# Patient Record
Sex: FEMALE | Race: WHITE | NOT HISPANIC OR LATINO | Employment: OTHER | URBAN - METROPOLITAN AREA
[De-identification: names, ages, dates, MRNs, and addresses within clinical notes are randomized per-mention and may not be internally consistent; named-entity substitution may affect disease eponyms.]

---

## 2017-01-12 ENCOUNTER — TRANSFERRED RECORDS (OUTPATIENT)
Dept: HEALTH INFORMATION MANAGEMENT | Facility: CLINIC | Age: 52
End: 2017-01-12

## 2017-08-29 ENCOUNTER — TELEPHONE (OUTPATIENT)
Dept: FAMILY MEDICINE | Age: 52
End: 2017-08-29

## 2018-05-13 ENCOUNTER — HOSPITAL ENCOUNTER (INPATIENT)
Facility: CLINIC | Age: 53
LOS: 4 days | Discharge: HOME | End: 2018-05-17
Attending: EMERGENCY MEDICINE | Admitting: INTERNAL MEDICINE
Payer: COMMERCIAL

## 2018-05-13 ENCOUNTER — APPOINTMENT (OUTPATIENT)
Dept: ULTRASOUND IMAGING | Facility: CLINIC | Age: 53
End: 2018-05-13
Attending: EMERGENCY MEDICINE
Payer: COMMERCIAL

## 2018-05-13 DIAGNOSIS — R11.2 INTRACTABLE VOMITING WITH NAUSEA, UNSPECIFIED VOMITING TYPE: ICD-10-CM

## 2018-05-13 DIAGNOSIS — E87.1 HYPONATREMIA: ICD-10-CM

## 2018-05-13 DIAGNOSIS — R94.31 PROLONGED Q-T INTERVAL ON ECG: ICD-10-CM

## 2018-05-13 DIAGNOSIS — K83.09 CHOLANGITIS (H): ICD-10-CM

## 2018-05-13 DIAGNOSIS — E87.6 HYPOKALEMIA: ICD-10-CM

## 2018-05-13 DIAGNOSIS — R10.13 ABDOMINAL PAIN, EPIGASTRIC: ICD-10-CM

## 2018-05-13 LAB
ALBUMIN SERPL-MCNC: 2.1 G/DL (ref 3.4–5)
ALP SERPL-CCNC: 441 U/L (ref 40–150)
ALT SERPL W P-5'-P-CCNC: 83 U/L (ref 0–50)
ANION GAP SERPL CALCULATED.3IONS-SCNC: 11 MMOL/L (ref 3–14)
AST SERPL W P-5'-P-CCNC: 119 U/L (ref 0–45)
BASOPHILS # BLD AUTO: 0.1 10E9/L (ref 0–0.2)
BASOPHILS NFR BLD AUTO: 0.3 %
BILIRUB SERPL-MCNC: 3.4 MG/DL (ref 0.2–1.3)
BUN SERPL-MCNC: 13 MG/DL (ref 7–30)
CALCIUM SERPL-MCNC: 8.5 MG/DL (ref 8.5–10.1)
CHLORIDE SERPL-SCNC: 95 MMOL/L (ref 94–109)
CO2 SERPL-SCNC: 25 MMOL/L (ref 20–32)
CREAT SERPL-MCNC: 0.62 MG/DL (ref 0.52–1.04)
DIFFERENTIAL METHOD BLD: ABNORMAL
EOSINOPHIL # BLD AUTO: 0 10E9/L (ref 0–0.7)
EOSINOPHIL NFR BLD AUTO: 0 %
ERYTHROCYTE [DISTWIDTH] IN BLOOD BY AUTOMATED COUNT: 13.6 % (ref 10–15)
GFR SERPL CREATININE-BSD FRML MDRD: >90 ML/MIN/1.7M2
GLUCOSE SERPL-MCNC: 121 MG/DL (ref 70–99)
HCT VFR BLD AUTO: 36.3 % (ref 35–47)
HGB BLD-MCNC: 12.6 G/DL (ref 11.7–15.7)
IMM GRANULOCYTES # BLD: 0.1 10E9/L (ref 0–0.4)
IMM GRANULOCYTES NFR BLD: 0.6 %
INR PPP: 1.22 (ref 0.86–1.14)
LACTATE BLD-SCNC: 1.4 MMOL/L (ref 0.7–2)
LIPASE SERPL-CCNC: 142 U/L (ref 73–393)
LYMPHOCYTES # BLD AUTO: 0.8 10E9/L (ref 0.8–5.3)
LYMPHOCYTES NFR BLD AUTO: 3.4 %
MAGNESIUM SERPL-MCNC: 1.9 MG/DL (ref 1.6–2.3)
MCH RBC QN AUTO: 29.3 PG (ref 26.5–33)
MCHC RBC AUTO-ENTMCNC: 34.7 G/DL (ref 31.5–36.5)
MCV RBC AUTO: 84 FL (ref 78–100)
MONOCYTES # BLD AUTO: 0.6 10E9/L (ref 0–1.3)
MONOCYTES NFR BLD AUTO: 2.5 %
NEUTROPHILS # BLD AUTO: 20.6 10E9/L (ref 1.6–8.3)
NEUTROPHILS NFR BLD AUTO: 93.2 %
NRBC # BLD AUTO: 0 10*3/UL
NRBC BLD AUTO-RTO: 0 /100
PLATELET # BLD AUTO: 387 10E9/L (ref 150–450)
POTASSIUM SERPL-SCNC: 2.1 MMOL/L (ref 3.4–5.3)
PROT SERPL-MCNC: 8.3 G/DL (ref 6.8–8.8)
RBC # BLD AUTO: 4.3 10E12/L (ref 3.8–5.2)
SODIUM SERPL-SCNC: 131 MMOL/L (ref 133–144)
WBC # BLD AUTO: 22.2 10E9/L (ref 4–11)

## 2018-05-13 PROCEDURE — 83735 ASSAY OF MAGNESIUM: CPT | Performed by: EMERGENCY MEDICINE

## 2018-05-13 PROCEDURE — 87040 BLOOD CULTURE FOR BACTERIA: CPT | Performed by: INTERNAL MEDICINE

## 2018-05-13 PROCEDURE — 99223 1ST HOSP IP/OBS HIGH 75: CPT | Mod: AI | Performed by: INTERNAL MEDICINE

## 2018-05-13 PROCEDURE — 85610 PROTHROMBIN TIME: CPT | Performed by: EMERGENCY MEDICINE

## 2018-05-13 PROCEDURE — 99285 EMERGENCY DEPT VISIT HI MDM: CPT | Mod: 25

## 2018-05-13 PROCEDURE — 96375 TX/PRO/DX INJ NEW DRUG ADDON: CPT

## 2018-05-13 PROCEDURE — 76705 ECHO EXAM OF ABDOMEN: CPT

## 2018-05-13 PROCEDURE — 36415 COLL VENOUS BLD VENIPUNCTURE: CPT | Performed by: INTERNAL MEDICINE

## 2018-05-13 PROCEDURE — 12000007 ZZH R&B INTERMEDIATE

## 2018-05-13 PROCEDURE — 25000128 H RX IP 250 OP 636: Performed by: EMERGENCY MEDICINE

## 2018-05-13 PROCEDURE — 83605 ASSAY OF LACTIC ACID: CPT | Performed by: EMERGENCY MEDICINE

## 2018-05-13 PROCEDURE — 96365 THER/PROPH/DIAG IV INF INIT: CPT

## 2018-05-13 PROCEDURE — 80053 COMPREHEN METABOLIC PANEL: CPT | Performed by: EMERGENCY MEDICINE

## 2018-05-13 PROCEDURE — 85025 COMPLETE CBC W/AUTO DIFF WBC: CPT | Performed by: EMERGENCY MEDICINE

## 2018-05-13 PROCEDURE — 25000132 ZZH RX MED GY IP 250 OP 250 PS 637: Performed by: EMERGENCY MEDICINE

## 2018-05-13 PROCEDURE — 93005 ELECTROCARDIOGRAM TRACING: CPT

## 2018-05-13 PROCEDURE — 25000132 ZZH RX MED GY IP 250 OP 250 PS 637: Performed by: INTERNAL MEDICINE

## 2018-05-13 PROCEDURE — 25000128 H RX IP 250 OP 636: Performed by: INTERNAL MEDICINE

## 2018-05-13 PROCEDURE — 96361 HYDRATE IV INFUSION ADD-ON: CPT

## 2018-05-13 PROCEDURE — 83690 ASSAY OF LIPASE: CPT | Performed by: EMERGENCY MEDICINE

## 2018-05-13 RX ORDER — POTASSIUM CL/LIDO/0.9 % NACL 10MEQ/0.1L
10 INTRAVENOUS SOLUTION, PIGGYBACK (ML) INTRAVENOUS ONCE
Status: COMPLETED | OUTPATIENT
Start: 2018-05-13 | End: 2018-05-13

## 2018-05-13 RX ORDER — LISINOPRIL 10 MG/1
10 TABLET ORAL DAILY
Status: ON HOLD | COMMUNITY
End: 2018-05-17

## 2018-05-13 RX ORDER — BISACODYL 10 MG
10 SUPPOSITORY, RECTAL RECTAL DAILY PRN
Status: DISCONTINUED | OUTPATIENT
Start: 2018-05-13 | End: 2018-05-17 | Stop reason: HOSPADM

## 2018-05-13 RX ORDER — POTASSIUM CHLORIDE 1500 MG/1
20-40 TABLET, EXTENDED RELEASE ORAL
Status: DISCONTINUED | OUTPATIENT
Start: 2018-05-13 | End: 2018-05-17 | Stop reason: HOSPADM

## 2018-05-13 RX ORDER — SIMVASTATIN 20 MG
20 TABLET ORAL AT BEDTIME
Status: ON HOLD | COMMUNITY
End: 2021-04-30

## 2018-05-13 RX ORDER — ONDANSETRON 2 MG/ML
4 INJECTION INTRAMUSCULAR; INTRAVENOUS
Status: COMPLETED | OUTPATIENT
Start: 2018-05-13 | End: 2018-05-13

## 2018-05-13 RX ORDER — MAGNESIUM SULFATE HEPTAHYDRATE 40 MG/ML
4 INJECTION, SOLUTION INTRAVENOUS EVERY 4 HOURS PRN
Status: DISCONTINUED | OUTPATIENT
Start: 2018-05-13 | End: 2018-05-17 | Stop reason: HOSPADM

## 2018-05-13 RX ORDER — URSODIOL 300 MG/1
600 CAPSULE ORAL 2 TIMES DAILY
Status: DISCONTINUED | OUTPATIENT
Start: 2018-05-13 | End: 2018-05-17 | Stop reason: HOSPADM

## 2018-05-13 RX ORDER — AMOXICILLIN 250 MG
1 CAPSULE ORAL 2 TIMES DAILY PRN
Status: DISCONTINUED | OUTPATIENT
Start: 2018-05-13 | End: 2018-05-17 | Stop reason: HOSPADM

## 2018-05-13 RX ORDER — AZITHROMYCIN 250 MG/1
250 TABLET, FILM COATED ORAL DAILY
Status: ON HOLD | COMMUNITY
End: 2018-05-17

## 2018-05-13 RX ORDER — PROCHLORPERAZINE MALEATE 5 MG
10 TABLET ORAL EVERY 6 HOURS PRN
Status: DISCONTINUED | OUTPATIENT
Start: 2018-05-13 | End: 2018-05-17 | Stop reason: HOSPADM

## 2018-05-13 RX ORDER — OXYCODONE HYDROCHLORIDE 5 MG/1
5-10 TABLET ORAL
Status: DISCONTINUED | OUTPATIENT
Start: 2018-05-13 | End: 2018-05-17 | Stop reason: HOSPADM

## 2018-05-13 RX ORDER — NALOXONE HYDROCHLORIDE 0.4 MG/ML
.1-.4 INJECTION, SOLUTION INTRAMUSCULAR; INTRAVENOUS; SUBCUTANEOUS
Status: DISCONTINUED | OUTPATIENT
Start: 2018-05-13 | End: 2018-05-17 | Stop reason: HOSPADM

## 2018-05-13 RX ORDER — MULTIVITAMIN,THERAPEUTIC
1 TABLET ORAL DAILY
Status: ON HOLD | COMMUNITY
End: 2021-04-13

## 2018-05-13 RX ORDER — POTASSIUM CHLORIDE 1.5 G/1.58G
20-40 POWDER, FOR SOLUTION ORAL
Status: DISCONTINUED | OUTPATIENT
Start: 2018-05-13 | End: 2018-05-17 | Stop reason: HOSPADM

## 2018-05-13 RX ORDER — POTASSIUM CHLORIDE 1500 MG/1
20 TABLET, EXTENDED RELEASE ORAL ONCE
Status: COMPLETED | OUTPATIENT
Start: 2018-05-13 | End: 2018-05-13

## 2018-05-13 RX ORDER — LISINOPRIL 10 MG/1
10 TABLET ORAL DAILY
Status: DISCONTINUED | OUTPATIENT
Start: 2018-05-13 | End: 2018-05-17 | Stop reason: HOSPADM

## 2018-05-13 RX ORDER — SODIUM CHLORIDE, SODIUM LACTATE, POTASSIUM CHLORIDE, CALCIUM CHLORIDE 600; 310; 30; 20 MG/100ML; MG/100ML; MG/100ML; MG/100ML
INJECTION, SOLUTION INTRAVENOUS CONTINUOUS
Status: DISCONTINUED | OUTPATIENT
Start: 2018-05-13 | End: 2018-05-15

## 2018-05-13 RX ORDER — URSODIOL 300 MG/1
300 CAPSULE ORAL 2 TIMES DAILY
COMMUNITY
End: 2021-03-23

## 2018-05-13 RX ORDER — HYDROMORPHONE HYDROCHLORIDE 1 MG/ML
.3-.5 INJECTION, SOLUTION INTRAMUSCULAR; INTRAVENOUS; SUBCUTANEOUS
Status: DISCONTINUED | OUTPATIENT
Start: 2018-05-13 | End: 2018-05-17 | Stop reason: HOSPADM

## 2018-05-13 RX ORDER — HYDROCHLOROTHIAZIDE 25 MG/1
25 TABLET ORAL DAILY
Status: ON HOLD | COMMUNITY
End: 2018-05-17

## 2018-05-13 RX ORDER — PROCHLORPERAZINE 25 MG
25 SUPPOSITORY, RECTAL RECTAL EVERY 12 HOURS PRN
Status: DISCONTINUED | OUTPATIENT
Start: 2018-05-13 | End: 2018-05-17 | Stop reason: HOSPADM

## 2018-05-13 RX ORDER — ONDANSETRON 2 MG/ML
4 INJECTION INTRAMUSCULAR; INTRAVENOUS EVERY 6 HOURS PRN
Status: DISCONTINUED | OUTPATIENT
Start: 2018-05-13 | End: 2018-05-17 | Stop reason: HOSPADM

## 2018-05-13 RX ORDER — AMOXICILLIN 250 MG
2 CAPSULE ORAL 2 TIMES DAILY PRN
Status: DISCONTINUED | OUTPATIENT
Start: 2018-05-13 | End: 2018-05-17 | Stop reason: HOSPADM

## 2018-05-13 RX ORDER — POTASSIUM CL/LIDO/0.9 % NACL 10MEQ/0.1L
10 INTRAVENOUS SOLUTION, PIGGYBACK (ML) INTRAVENOUS
Status: DISCONTINUED | OUTPATIENT
Start: 2018-05-13 | End: 2018-05-17 | Stop reason: HOSPADM

## 2018-05-13 RX ORDER — POTASSIUM CHLORIDE 29.8 MG/ML
20 INJECTION INTRAVENOUS
Status: DISCONTINUED | OUTPATIENT
Start: 2018-05-13 | End: 2018-05-17 | Stop reason: HOSPADM

## 2018-05-13 RX ORDER — SODIUM CHLORIDE 9 MG/ML
1000 INJECTION, SOLUTION INTRAVENOUS CONTINUOUS
Status: DISCONTINUED | OUTPATIENT
Start: 2018-05-13 | End: 2018-05-13

## 2018-05-13 RX ORDER — POTASSIUM CHLORIDE 7.45 MG/ML
10 INJECTION INTRAVENOUS
Status: DISCONTINUED | OUTPATIENT
Start: 2018-05-13 | End: 2018-05-17 | Stop reason: HOSPADM

## 2018-05-13 RX ORDER — HYDROMORPHONE HYDROCHLORIDE 1 MG/ML
0.5 INJECTION, SOLUTION INTRAMUSCULAR; INTRAVENOUS; SUBCUTANEOUS
Status: COMPLETED | OUTPATIENT
Start: 2018-05-13 | End: 2018-05-13

## 2018-05-13 RX ORDER — GUAIFENESIN AND CODEINE PHOSPHATE 100; 10 MG/5ML; MG/5ML
1 SOLUTION ORAL
Status: ON HOLD | COMMUNITY
End: 2021-04-13

## 2018-05-13 RX ORDER — ONDANSETRON 4 MG/1
4 TABLET, ORALLY DISINTEGRATING ORAL EVERY 6 HOURS PRN
Status: DISCONTINUED | OUTPATIENT
Start: 2018-05-13 | End: 2018-05-17 | Stop reason: HOSPADM

## 2018-05-13 RX ADMIN — Medication 10 MEQ: at 10:51

## 2018-05-13 RX ADMIN — HYDROMORPHONE HYDROCHLORIDE 0.5 MG: 1 INJECTION, SOLUTION INTRAMUSCULAR; INTRAVENOUS; SUBCUTANEOUS at 09:32

## 2018-05-13 RX ADMIN — Medication 10 MEQ: at 14:30

## 2018-05-13 RX ADMIN — ONDANSETRON 4 MG: 2 INJECTION INTRAMUSCULAR; INTRAVENOUS at 09:29

## 2018-05-13 RX ADMIN — Medication 10 MEQ: at 18:54

## 2018-05-13 RX ADMIN — Medication 10 MEQ: at 22:36

## 2018-05-13 RX ADMIN — POTASSIUM CHLORIDE 20 MEQ: 1500 TABLET, EXTENDED RELEASE ORAL at 10:51

## 2018-05-13 RX ADMIN — TAZOBACTAM SODIUM AND PIPERACILLIN SODIUM 3.38 G: 375; 3 INJECTION, SOLUTION INTRAVENOUS at 11:58

## 2018-05-13 RX ADMIN — SODIUM CHLORIDE 1000 ML: 9 INJECTION, SOLUTION INTRAVENOUS at 09:34

## 2018-05-13 RX ADMIN — SODIUM CHLORIDE, POTASSIUM CHLORIDE, SODIUM LACTATE AND CALCIUM CHLORIDE: 600; 310; 30; 20 INJECTION, SOLUTION INTRAVENOUS at 14:30

## 2018-05-13 RX ADMIN — SODIUM CHLORIDE 1000 ML: 9 INJECTION, SOLUTION INTRAVENOUS at 11:58

## 2018-05-13 RX ADMIN — Medication 10 MEQ: at 13:16

## 2018-05-13 RX ADMIN — TAZOBACTAM SODIUM AND PIPERACILLIN SODIUM 3.38 G: 375; 3 INJECTION, SOLUTION INTRAVENOUS at 18:03

## 2018-05-13 RX ADMIN — Medication 10 MEQ: at 21:26

## 2018-05-13 RX ADMIN — URSODIOL 600 MG: 300 CAPSULE ORAL at 21:03

## 2018-05-13 ASSESSMENT — ENCOUNTER SYMPTOMS
COUGH: 1
VOMITING: 1
CONSTIPATION: 1
ABDOMINAL PAIN: 1

## 2018-05-13 ASSESSMENT — ACTIVITIES OF DAILY LIVING (ADL)
ADLS_ACUITY_SCORE: 9
ADLS_ACUITY_SCORE: 11

## 2018-05-13 NOTE — IP AVS SNAPSHOT
MRN:[id number]                      After Visit Summary   5/13/2018    Berta Nava    MRN: [id number]           Thank you!     Thank you for choosing [facility] for your care. Our goal is always to provide you with excellent care. Hearing back from our patients is one way we can continue to improve our services. Please take a few minutes to complete the written survey that you may receive in the mail after you visit. If you would like to speak to someone directly about your visit please contact Patient Relations at [phone]. Thank you!          Patient Information     Date Of Birth          1965        Designated Caregiver       Most Recent Value    Caregiver    Will someone help with your care after discharge? no      About your hospital stay     You were admitted on:  May 13, 2018 You last received care in the:  [place] [address] [facility]    You were discharged on:  May 17, 2018        Reason for your hospital stay       cholangitis                  Who to Call     For medical emergencies, please call 911.  For non-urgent questions about your medical care, please call your primary care provider or clinic, [phone]          Attending Provider     Provider Specialty    Scot Harden MD Emergency Medicine    WilmingtonZafar hilario MD Internal Medicine       Primary Care Provider Office Phone # Fax #    [facility] [phone]       When to contact your care team       Increase in pain, nausea,vomiting or fever 101 or greater.                  After Care Instructions     Activity       Your activity upon discharge: activity as tolerated            Diet       Follow this diet upon discharge: Orders Placed This Encounter      Low Fiber Diet                  Follow-up Appointments     Follow-up and recommended labs and tests        Follow up with primary care provider, [facility], within 7 days to evaluate medication  "change and for hospital follow- up.  The following labs/tests are recommended: potassium and blood pressure check.  Stopped lisinopril and Dyazide due to soft blood pressure and hypokalemia.    Follow up with primary GI doctor.                  Pending Results     Date and Time Order Name Status Description    2018 1107 Blood culture Preliminary     2018 1107 Blood culture Preliminary             Statement of Approval     Ordered          18 1123  I have reviewed and agree with all the recommendations and orders detailed in this document.  EFFECTIVE NOW     Approved and electronically signed by:  James Wolf MD             Admission Information     Date & Time Provider Department Dept. Phone    2018 Zafar Nicole MD [place] [address] [facility] [phone]      Your Vitals Were     Blood Pressure Pulse Temperature Respirations Height Weight    95/55 (BP Location: Left arm) 98 98  F (36.7  C) (Oral) 17 1.651 m (5' 5\") 74.6 kg (164 lb 6.4 oz)    Pulse Oximetry BMI (Body Mass Index)                97% 27.36 kg/m2          e[employer] Information     [employer] lets you send messages to your doctor, view your test results, renew your prescriptions, schedule appointments and more. To sign up, go to [email or web address] . Click on \"Log in\" on the left side of the screen, which will take you to the Welcome page. Then click on \"Sign up Now\" on the right side of the page.     You will be asked to enter the access code listed below, as well as some personal information. Please follow the directions to create your username and password.     Your access code is: [email or web address]  Expires: 8/15/2018 11:24 AM     Your access code will  in 90 days. If you need help or a new code, please call your [place] clinic or [phone].        Care EveryWhere ID     This is your Care EveryWhere ID. This could be used by other organizations to access your [place] medical records  [id number]      "   Equal Access to Services     [facility]: Hadii aad ku hadrehancarmine Jaimeeselvin, waaxda luqadaha, qaybta kaalmada champ, arlyn berry. So [facility] [phone].    ATENCIÓN: Si sarahla toshia, tiene a marks disposición servicios gratuitos de asistencia lingüística. Papaame al [phone].    We comply with applicable federal civil rights laws and [place] laws. We do not discriminate on the basis of race, color, national origin, age, disability, sex, sexual orientation, or gender identity.               Review of your medicines      START taking        Dose / Directions    amoxicillin-clavulanate 875-125 MG per tablet   Commonly known as:  AUGMENTIN   Indication:  Infection Within the Abdomen        Dose:  1 tablet   Take 1 tablet by mouth every 12 hours for 7 days   Quantity:  14 tablet   Refills:  0         CONTINUE these medicines which have NOT CHANGED        Dose / Directions    Calcium carb-Vitamin D 500 mg [place]-200 units 500-200 MG-UNIT per tablet   Commonly known as:  OSCAL with D;Oyster Shell Calcium        Dose:  1 tablet   Take 1 tablet by mouth daily   Refills:  0       guaiFENesin-codeine 100-10 MG/5ML Soln   Commonly known as:  ROBITUSSIN AC        Dose:  1 tsp.   Take 1 tsp. by mouth nightly as needed for cough   Refills:  0       multivitamin, therapeutic Tabs tablet        Dose:  1 tablet   Take 1 tablet by mouth daily   Refills:  0       simvastatin 20 MG tablet   Commonly known as:  ZOCOR        Dose:  20 mg   Take 20 mg by mouth At Bedtime   Refills:  0       ursodiol 300 MG capsule   Commonly known as:  ACTIGALL        Dose:  600 mg   Take 600 mg by mouth 2 times daily   Refills:  0         STOP taking     azithromycin 250 MG tablet   Commonly known as:  ZITHROMAX           hydrochlorothiazide 25 MG tablet   Commonly known as:  HYDRODIURIL           lisinopril 10 MG tablet   Commonly known as:  PRINIVIL/ZESTRIL                Where to get your medicines      These  medications were sent to [facility], [place] - [address] [address]  [address] [address], [facility] [address]     Phone:  [phone]     amoxicillin-clavulanate 875-125 MG per tablet                Protect others around you: Learn how to safely use, store and throw away your medicines at www.disposemymeds.org.        ANTIBIOTIC INSTRUCTION     You've Been Prescribed an Antibiotic - Now What?  Your healthcare team thinks that you or your loved one might have an infection. Some infections can be treated with antibiotics, which are powerful, life-saving drugs. Like all medications, antibiotics have side effects and should only be used when necessary. There are some important things you should know about your antibiotic treatment.      Your healthcare team may run tests before you start taking an antibiotic.    Your team may take samples (e.g., from your blood, urine or other areas) to run tests to look for bacteria. These test can be important to determine if you need an antibiotic at all and, if you do, which antibiotic will work best.      Within a few days, your healthcare team might change or even stop your antibiotic.    Your team may start you on an antibiotic while they are working to find out what is making you sick.    Your team might change your antibiotic because test results show that a different antibiotic would be better to treat your infection.    In some cases, once your team has more information, they learn that you do not need an antibiotic at all. They may find out that you don't have an infection, or that the antibiotic you're taking won't work against your infection. For example, an infection caused by a virus can't be treated with antibiotics. Staying on an antibiotic when you don't need it is more likely to be harmful than helpful.      You may experience side effects from your antibiotic.    Like all medications, antibiotics have side effects. Some of these can be  serious.    Let you healthcare team know if you have any known allergies when you are admitted to the hospital.    One significant side effect of nearly all antibiotics is the risk of severe and sometimes deadly diarrhea caused by Clostridium difficile (C. Difficile). This occurs when a person takes antibiotics because some good germs are destroyed. Antibiotic use allows C. diificile to take over, putting patients at high risk for this serious infection.    As a patient or caregiver, it is important to understand your or your loved one's antibiotic treatment. It is especially important for caregivers to speak up when patients can't speak for themselves. Here are some important questions to ask your healthcare team.    What infection is this antibiotic treating and how do you know I have that infection?    What side effects might occur from this antibiotic?    How long will I need to take this antibiotic?    Is it safe to take this antibiotic with other medications or supplements (e.g., vitamins) that I am taking?     Are there any special directions I need to know about taking this antibiotic? For example, should I take it with food?    How will I be monitored to know whether my infection is responding to the antibiotic?    What tests may help to make sure the right antibiotic is prescribed for me?      Information provided by:  www.cdc.gov/getsmart  U.S. Department of Health and Human Services  Centers for disease Control and Prevention  National Center for Emerging and Zoonotic Infectious Diseases  Division of Healthcare Quality Promotion             Medication List: This is a list of all your medications and when to take them. Check marks below indicate your daily home schedule. Keep this list as a reference.      Medications           Morning Afternoon Evening Bedtime As Needed    amoxicillin-clavulanate 875-125 MG per tablet   Commonly known as:  AUGMENTIN   Take 1 tablet by mouth every 12 hours for 7 days    Last time this was given:  1 tablet on 5/17/2018  8:36 AM                                Calcium carb-Vitamin D 500 mg [place]-200 units 500-200 MG-UNIT per tablet   Commonly known as:  OSCAL with D;Oyster Shell Calcium   Take 1 tablet by mouth daily                                guaiFENesin-codeine 100-10 MG/5ML Soln   Commonly known as:  ROBITUSSIN AC   Take 1 tsp. by mouth nightly as needed for cough                                multivitamin, therapeutic Tabs tablet   Take 1 tablet by mouth daily                                simvastatin 20 MG tablet   Commonly known as:  ZOCOR   Take 20 mg by mouth At Bedtime                                ursodiol 300 MG capsule   Commonly known as:  ACTIGALL   Take 600 mg by mouth 2 times daily   Last time this was given:  600 mg on 5/17/2018  8:36 AM                                          More Information      Eating a Low-fiber Diet  What is fiber?  Fiber is the part of food that the body cannot digest. It helps form stools (bowel movements).   If you eat less fiber, you may:    Reduce belly pain, diarrhea (loose, watery stools) and other digestive problems    Have fewer and smaller stools    Decrease inflammation (pain, redness and swelling) in the GI (gastro-intestinal) tract    Promote healing in the GI tract.  For a list of foods allowed in a low-fiber diet, see the back of this page.  Why might I need a low-fiber diet?  You may need a low-fiber diet if you have:     Inflamed bowels    Crohn's disease    Diverticular disease    Ulcerative colitis    Radiation therapy to the belly area    Chemotherapy    An upcoming colonoscopy    Surgery on your intestines or in the belly area.  Sample Menu  Breakfast:   1 scrambled egg   1 slice white toast with 1 teaspoon margarine     cup Cream of Wheat with sugar     cup milk      cup pulp-free orange juice  Snack:     cup canned fruit cocktail (in juice)   6 saltine crackers  Lunch:   Tuna sandwich on white bread    1 cup cream  "of chicken soup     cup canned peaches (in light syrup)   1 cup lemonade  Snack:     cup cottage cheese   1 medium apple, sliced and peeled  Dinner:   3 ounces well-cooked chicken breast   1 cup white rice     cup cooked canned carrots   1 white dinner roll with 1 teaspoon margarine   1 slice abby food cake   1 cup herbal tea  Food group Allowed Avoid   Grains Foods that contain refined white flour   (1 gram fiber or less per serving), such as bread, pasta, muffins, cereals, crackers, etc.; white rice; Cream of Wheat; Cream of Rice Whole grains (whole wheat bread, oatmeal, barley, brown or wild rice); foods containing nuts, seeds or bran   Vegetables Canned or well-cooked vegetables; mashed potatoes; non-gas-forming vegetables; vegetables without skin, seeds or pulp; vegetable juice ( 1/2 cup per day or less) Raw vegetables; cooked greens or spinach;   gas-forming vegetables (broccoli, cauliflower, brussels sprouts)   Fruits Peeled fresh fruit (bananas, apples, melons, nectarines); canned fruit (in juice or light syrup); fruit juice without pulp Dried fruit; fruit with pulp (oranges, grapefruit, pineapple); unpeeled fruit; prune juice   Meats and   other proteins Tender, well-cooked or ground meats; fish; eggs; tofu; smooth nut butters (peanut, soy, almond, sunflower) Crunchy nut butters; tough meats; meats with gristle (duarte, sausage); dried beans or peas (legumes)   Milk products Milk, soy milk, rice milk, almond milk, coconut milk; yogurt, soy yogurt; cottage cheese, mild cheese; ice cream, sherbet If you are lactose intolerant: avoid milk, dairy products and foods made with milk  Note: Some people become lactose intolerant after surgery. This may or may not improve over time.   Other Salad dressings; oil, butter, margarine; jelly, honey, syrup Any food containing nuts or seeds; coconut; marmalade; carbonated (\"fizzy\") drinks   For informational purposes only. Not to replace the advice of your health care " provider.   Copyright   2007 [facility]. All rights reserved. [employer] 792961 - REV 09/15.            Low-Fiber Diet     Eggs are high in protein and easy to digest.   Eating a low-fiber diet means eating foods that don t have much fiber. These foods are easy to digest.  Most of the fiber that you eat passes undigested through your bowel. This is what forms stool. Low-fiber foods can help to slow down your bowel movements. When you eat a low-fiber diet, you have fewer stools. This lets your intestine rest.  Your healthcare provider will tell you how long you need to be on this diet. It may only be for a short time. Low-fiber foods often don t give you all the nutrients you need to stay healthy. Your healthcare provider may have you take certain vitamins while you are on this diet.  Reasons to eat a low-fiber diet  The goal of a low-fiber diet is to limit the size and number of your stools. It may be prescribed if you:    Are going through chemotherapy or radiation treatments    Have had intestinal surgery    Have a condition that affects your intestine, such as irritable bowel syndrome, Crohn s disease, ulcerative colitis, or diverticulitis  General guidelines for a low-fiber diet  In general, a low-fiber diet means having fewer than 13 grams of fiber a day. Your healthcare provider may give you a list of things you can and can t eat or drink. Read food labels. Choose foods and drinks that have as close to zero grams of fiber as possible. Here are general guidelines to follow:  Breads, pasta, cereal, rice, and other starches (6 to 11 servings daily)    What to choose: white bread, biscuits, muffins, and white rolls; plain crackers; waffles; white pasta; white rice; cream of wheat; grits; white pancakes; corn flakes; cooked potatoes without skin. Fiber content of these foods should be less than 0.5 (1/2) gram per serving.    What to avoid: whole-wheat or whole-grain breads, crackers, and pasta; breads  with seeds or nuts; wheat germ; feliberto crackers; cornbread; wild or brown rice; cereals with whole-grain, bran, and granola; cereals with seeds, nuts, coconut, or dried fruit; potatoes with skin  Milk and dairy (2 servings daily)    What to choose: milk, buttermilk; yogurt or ice cream without seeds or nuts; custard or pudding; sour cream; cheese and cottage cheese    What to avoid: ice cream and yogurt with seeds, nuts, or fruit chunks  Fruit (2 to 4 servings daily)    What to choose: ripe banana; ripe nectarine, peach, apricot, papaya, and plum; soft honeydew melon and cantaloupe; cooked or canned fruit without skin or seeds (not sweetened with sorbitol); applesauce; strained fruit juice (without pulp)    What to avoid: raw or dried fruit; all berries; raisins; canned and raw pineapple; prunes and prune juice; fruit juice with pulp  Vegetables (3 to 5 servings daily)    What to choose: well-cooked or canned vegetables without seeds, such as spinach, eggplant, green and wax beans, carrots, yellow squash, pumpkin; lettuce on a sandwich    What to avoid: all raw or steamed vegetables; vegetables with seeds, such as unstrained tomato sauce; green peas; lima beans; broccoli; corn; parsnips  Meats and protein (4 to 6 ounces daily)    What to choose: tender, well-cooked meat, including ground meat, poultry, and fish; eggs; tofu; creamy peanut butter    What to avoid: tough, chewy meat with gristle; peas, including split, yellow, and black-eyed; beans, including navy, lima, black, garbanzo, soy, duran, and lentil; peanuts and crunchy peanut butter   Fats, oils, sauces, condiments (fewer than 8 teaspoons daily)    What to choose: butter, margarine, oils, whipped cream, sour cream, mayonnaise, smooth dressings and sauces; plain gravy; smooth condiments    What to avoid: dressing with seeds or fruit chunks; pickles and relishes  Other foods and drinks    What to choose: water; plain gelatin; plain puddings; pretzels; plain  cookies and cakes; honey, syrup; decaffeinated drinks, including tea and coffee      What to avoid: popcorn; potato chips; spicy foods; fried, greasy foods; alcohol (ask your healthcare provider); marmalade, jam, and preserves; desserts that have seeds, nuts, coconut, dried fruit, whole grains, or bran; candy that has seeds or nuts; drinks sweetened with sorbitol or other sugar substitutes; caffeinated drinks, including tea, coffee, soda, and energy drinks  Date Last Reviewed: 6/1/2017 2000-2017 [employer]. [address] [place] [address]. All rights reserved. This information is not intended as a substitute for professional medical care. Always follow your healthcare professional's instructions.                Discharge Instructions for Hypokalemia  You have been diagnosed with hypokalemia. This means you have a low level of potassium in your blood. Potassium helps your nerve and muscle cells work as they should. These cells include the cells in your heart. A low level of potassium in the blood can cause serious problems, such as abnormal heart rhythms and even heart attack.  Diet changes  Eat more potassium-rich foods:    Bananas    Oranges and orange juice    Tomatoes, tomato sauce, and tomato juice    Leafy green vegetables, such as spinach, kale, salad greens, collards, and chard    Melons (all kinds)    Pomegranates    Peas    Beans    Potatoes    Sweet potatoes    Avocados, including guacamole    Vegetable juices, such as V8    Fruit juices    All nuts and seeds    Fish, including tuna, halibut, salmon, cod, snapper, mary, swordfish, and perch    Milk, including fat-free, low-fat, whole, chocolate, and buttermilk    Soy milk  Other home care    Take a potassium supplement as directed by your healthcare provider.    After strenuous exercise or any activity that causes you to sweat a lot, grab a beverage high in potassium. This includes chocolate milk, coconut water, orange juice,  or low-sodium vegetable juices.    Be sure to eat foods or drink fluids that contain potassium if you are having diarrhea or vomiting.    Have your potassium levels checked regularly.    Take all medicines exactly as directed.    Tell your healthcare provider about all prescription and over-the-counter medicines you are taking. This includes herbal products.    Avoid foods that are high in salt. Avoid canned and prepared foods that are high in salt.  Follow-up    Make a follow-up appointment as directed by our staff.    Keep all follow-up appointments. Your healthcare provider needs to monitor your condition closely.     When to call your healthcare provider  Call your provider right away or go to the emergency room if you have any of the following:    Vomiting    Fatigue    Diarrhea    Rapid, irregular heartbeat    Shortness of breath    Chest pain    Muscle cramps, spasms, or twitching    Weakness    Paralysis   Date Last Reviewed: 6/1/2017 2000-2017 [employer]. [address] [place] [address]. All rights reserved. This information is not intended as a substitute for professional medical care. Always follow your healthcare professional's instructions.

## 2018-05-13 NOTE — PHARMACY-ADMISSION MEDICATION HISTORY
Admission medication history interview status for this patient is complete. See [facility] admission navigator for allergy information, prior to admission medications and immunization status.     Medication history interview source(s):Patient  Medication history resources (including written lists, pill bottles, clinic record):Medication list  Primary pharmacy:[facility]    Changes made to PTA medication list:  Added: Azithromycin, Hydrochlorothiazide, lisinopril, simvastatin, ursodiol, Robitussin AC, multivitamin, Calcium/D3  Deleted: None  Changed: None    Actions taken by pharmacist (provider contacted, etc):None     Additional medication history information:None    Medication reconciliation/reorder completed by provider prior to medication history? No    Do you take OTC medications (eg tylenol, ibuprofen, fish oil, eye/ear drops, etc)? Yes    For patients on insulin therapy: No    Prior to Admission medications    Medication Sig Last Dose Taking? Auth Provider   azithromycin (ZITHROMAX) 250 MG tablet Take 250 mg by mouth daily Until 5/15/2018 5/12/2018 at AM Yes Unknown, Entered By History   Calcium carb-Vitamin D 500 mg [place]-200 units (OSCAL WITH D;OYSTER SHELL CALCIUM) 500-200 MG-UNIT per tablet Take 1 tablet by mouth daily 5/10/2018 at AM Yes Unknown, Entered By History   guaiFENesin-codeine (ROBITUSSIN AC) 100-10 MG/5ML SOLN Take 1 tsp. by mouth nightly as needed for cough 5/11/2018 at Bedtime Yes Unknown, Entered By History   hydrochlorothiazide (HYDRODIURIL) 25 MG tablet Take 25 mg by mouth daily 5/12/2018 at am Yes Unknown, Entered By History   lisinopril (PRINIVIL/ZESTRIL) 10 MG tablet Take 10 mg by mouth daily 5/12/2018 at AM Yes Unknown, Entered By History   multivitamin, therapeutic (THERA-VIT) TABS tablet Take 1 tablet by mouth daily 5/10/2018 at AM Yes Unknown, Entered By History   simvastatin (ZOCOR) 20 MG tablet Take 20 mg by mouth At Bedtime 5/12/2018 at AM Yes Unknown, Entered By History    ursodiol (ACTIGALL) 300 MG capsule Take 600 mg by mouth 2 times daily 5/12/2018 at AM Yes Unknown, Entered By History       Kashmir Mary,  Pharmacist Intern, *[phone]

## 2018-05-13 NOTE — H&P
[facility]  History and Physical   Hospitalist Service    Zafar Nicole MD    Berta Nava MRN# [id number]   YOB: 1965 Age: 52 year old      Date of Admission:  5/13/2018           Assessment and Plan:   Berta Nava is a 52-year-old female with history of hypertension, hypokalemia, biliary cirrhosis, and ascites.  She presented to the emergency department for evaluation of worsening abdominal pain, nausea, and vomiting.  She has been having some abdominal pain off and on for the last 2 or 3 weeks.  This pain was not severe and seemed to wax and wane.  She developed some cough in the last several days for which she was evaluated in urgent care.  She was prescribed Zithromax for possible pneumonia.  She did not have abdominal pain at that time.  The abdominal pain worsened this morning.  She had associated nausea and vomiting.  She went to urgent care for evaluation and was referred to the emergency department.  Emergency department evaluation showed stable vital signs.  Of note, she had temperature of 100.4  at home prior to presentation.  Laboratory evaluation showed leukocytosis with white blood cell count 22.2.  The remainder of the CBC was unremarkable.  Potassium was 2.1 and the remainder of the basic metabolic panel was unremarkable.  Liver function tests were abnormal with total bilirubin of 3.4 (this is usually normal).  Alkaline phosphatase was 441 which is similar to Berta's baseline.  ALT was 83 and AST was 119 which also seem to be similar to Berta's baseline.  Abdominal ultrasound was obtained and showed dilated gallbladder and common bile duct.  No gallstones or gallbladder wall thickening was noted.  Gastroenterology was consulted by phone and suspected acute cholangitis.  Admission for IV antibiotics was recommended.    Problem list:    1.  Suspected acute cholangitis.  There is no evidence for gallstones or pancreatitis. GI consultation appreciated.  Admit  as inpatient and treat with Zosyn.  Diet will be clear liquids.  Analgesic and antiemetic medications will be available for use as needed.  Blood cultures will be followed.    2.  Hypokalemia, severe.  This may be due to hydrochlorothiazide.  Stop hydrochlorothiazide.  Replace potassium per protocol.  Monitor on telemetry until potassium has normalized.    3.  Hypertension.  Continue prior to admission lisinopril 10 mg by mouth daily.  Stop hydrochlorothiazide with recurrent hypokalemia.  Lisinopril dose may need to need to be increased in absence of hydrochlorothiazide.     4.  Biliary cirrhosis with chronic elevations of alkaline phosphatase, ALT, and AST.    Full code  Mechanical DVT prophylaxis  Disposition: Admit as inpatient           Code Status:   Full Code         Primary Care Physician:   [facility], [place] [phone]         Chief Complaint:   Abdominal pain    History is obtained from Dr. Dereck Hampton, and the medical record.         History of Present Illness:   Berta Nava is a 52-year-old female with history of hypertension, hypokalemia, biliary cirrhosis, and ascites.  She presented to the emergency department for evaluation of worsening abdominal pain, nausea, and vomiting.  She has been having some abdominal pain off and on for the last 2 or 3 weeks.  This pain was not severe and seemed to wax and wane.  She developed some cough in the last several days for which she was evaluated in urgent care.  She was prescribed Zithromax for possible pneumonia.  She did not have abdominal pain at that time.  The abdominal pain worsened this morning.  She had associated nausea and vomiting.  She went to urgent care for evaluation and was referred to the emergency department.  Emergency department evaluation showed stable vital signs.  Of note, she had temperature of 100.4  at home prior to presentation.  Laboratory evaluation showed leukocytosis with white blood cell count 22.2.  The remainder of  the CBC was unremarkable.  Potassium was 2.1 and the remainder of the basic metabolic panel was unremarkable.  Liver function tests were abnormal with total bilirubin of 3.4 (this is usually normal).  Alkaline phosphatase was 441 which is similar to Berta's baseline.  ALT was 83 and AST was 119 which also seem to be similar to Berta's baseline.  Abdominal ultrasound was obtained and showed dilated gallbladder and common bile duct.  No gallstones or gallbladder wall thickening was noted.  Gastroenterology was consulted by phone and suspected acute cholangitis.  Admission for IV antibiotics was recommended.           Past Medical History:     Patient Active Problem List   Diagnosis     Cholangitis     Hypokalemia      Past Medical History:   Diagnosis Date     Ascites      Biliary cirrhosis (H)      Hypertension              Past Surgical History:     Past Surgical History:   Procedure Laterality Date     GYN SURGERY              Home Medications:     Prior to Admission medications    Medication Sig Last Dose Taking? Auth Provider   azithromycin (ZITHROMAX) 250 MG tablet Take 250 mg by mouth daily Until 5/15/2018 5/12/2018 at AM Yes Unknown, Entered By History   Calcium carb-Vitamin D 500 mg [place]-200 units (OSCAL WITH D;OYSTER SHELL CALCIUM) 500-200 MG-UNIT per tablet Take 1 tablet by mouth daily 5/10/2018 at AM Yes Unknown, Entered By History   guaiFENesin-codeine (ROBITUSSIN AC) 100-10 MG/5ML SOLN Take 1 tsp. by mouth nightly as needed for cough 5/11/2018 at Bedtime Yes Unknown, Entered By History   hydrochlorothiazide (HYDRODIURIL) 25 MG tablet Take 25 mg by mouth daily 5/12/2018 at am Yes Unknown, Entered By History   lisinopril (PRINIVIL/ZESTRIL) 10 MG tablet Take 10 mg by mouth daily 5/12/2018 at AM Yes Unknown, Entered By History   multivitamin, therapeutic (THERA-VIT) TABS tablet Take 1 tablet by mouth daily 5/10/2018 at AM Yes Unknown, Entered By History   simvastatin (ZOCOR) 20 MG tablet Take 20 mg by  "mouth At Bedtime 5/12/2018 at AM Yes Unknown, Entered By History   ursodiol (ACTIGALL) 300 MG capsule Take 600 mg by mouth 2 times daily 5/12/2018 at AM Yes Unknown, Entered By History            Allergies:     Allergies   Allergen Reactions     Contrast Dye             Social History:     Social History   Substance Use Topics     Smoking status: Never Smoker     Smokeless tobacco: Never Used     Alcohol use No             Family History:   Angiosarcoma (mother)           Review of Systems:   The 10 point Review of Systems is negative other than as noted in the HPI.           Physical Exam:   Blood pressure 103/64, pulse 92, temperature 98.4  F (36.9  C), temperature source Oral, resp. rate 16, height 1.651 m (5' 5\"), weight 73.1 kg (161 lb 1.6 oz), SpO2 97 %.  161 lbs 1.6 oz      GENERAL: Pleasant and cooperative. No acute distress.  EYES: Pupils equal and round. No conjunctival erythema.  Conjunctival icterus is noted.  ENT: External ears are normal without deformity. Posterior oropharynx is without erythem, swelling, or exudate.  NECK: Supple. No masses or swelling. No tenderness. Thyroid is normal without mass or tenderness.  CHEST: Clear to auscultation. Normal breath sounds. No retractions.   CV: Regular rate and rhythm. No JVD. Pulses normal.  ABDOMEN: Bowel sounds present. Right upper quadrant abdominal tenderness. No masses or hernia.  EXTREMETIES: No clubbing, cyanosis, or ischemia.  SKIN: Warm and dry to touch. No wounds or rashes. Jaundice.  NEUROLOGIC: Strength and sensation are normal. Deep tendon reflexes are normal. Cranial nerves are normal.             Data:   All new lab and imaging data was reviewed.     Results for orders placed or performed during the hospital encounter of 05/13/18 (from the past 24 hour(s))   CBC with platelets differential   Result Value Ref Range    WBC 22.2 (H) 4.0 - 11.0 10e9/L    RBC Count 4.30 3.8 - 5.2 10e12/L    Hemoglobin 12.6 11.7 - 15.7 g/dL    Hematocrit " 36.3 35.0 - 47.0 %    MCV 84 78 - 100 fl    MCH 29.3 26.5 - 33.0 pg    MCHC 34.7 31.5 - 36.5 g/dL    RDW 13.6 10.0 - 15.0 %    Platelet Count 387 150 - 450 10e9/L    Diff Method Automated Method     % Neutrophils 93.2 %    % Lymphocytes 3.4 %    % Monocytes 2.5 %    % Eosinophils 0.0 %    % Basophils 0.3 %    % Immature Granulocytes 0.6 %    Nucleated RBCs 0 0 /100    Absolute Neutrophil 20.6 (H) 1.6 - 8.3 10e9/L    Absolute Lymphocytes 0.8 0.8 - 5.3 10e9/L    Absolute Monocytes 0.6 0.0 - 1.3 10e9/L    Absolute Eosinophils 0.0 0.0 - 0.7 10e9/L    Absolute Basophils 0.1 0.0 - 0.2 10e9/L    Abs Immature Granulocytes 0.1 0 - 0.4 10e9/L    Absolute Nucleated RBC 0.0    INR   Result Value Ref Range    INR 1.22 (H) 0.86 - 1.14   Comprehensive metabolic panel   Result Value Ref Range    Sodium 131 (L) 133 - 144 mmol/L    Potassium 2.1 (LL) 3.4 - 5.3 mmol/L    Chloride 95 94 - 109 mmol/L    Carbon Dioxide 25 20 - 32 mmol/L    Anion Gap 11 3 - 14 mmol/L    Glucose 121 (H) 70 - 99 mg/dL    Urea Nitrogen 13 7 - 30 mg/dL    Creatinine 0.62 0.52 - 1.04 mg/dL    GFR Estimate >90 >60 mL/min/1.7m2    GFR Estimate If Black >90 >60 mL/min/1.7m2    Calcium 8.5 8.5 - 10.1 mg/dL    Bilirubin Total 3.4 (H) 0.2 - 1.3 mg/dL    Albumin 2.1 (L) 3.4 - 5.0 g/dL    Protein Total 8.3 6.8 - 8.8 g/dL    Alkaline Phosphatase 441 (H) 40 - 150 U/L    ALT 83 (H) 0 - 50 U/L     (H) 0 - 45 U/L   Lipase   Result Value Ref Range    Lipase 142 73 - 393 U/L   Lactic acid whole blood   Result Value Ref Range    Lactic Acid 1.4 0.7 - 2.0 mmol/L   Magnesium   Result Value Ref Range    Magnesium 1.9 1.6 - 2.3 mg/dL   US Abdomen Limited    Narrative    RIGHT UPPER QUADRANT ULTRASOUND 5/13/2018 10:05 AM    HISTORY:  RUQ pain;     COMPARISON: None.    FINDINGS:    Gallbladder: The gallbladder is distended. No gallstones are  identified. Wall of the gallbladder appears normal. The patient is not  focally tender over the gallbladder.    Bile ducts:  Common  duct is dilated at 1 cm. The cause of the ductal  dilatation is not identified on this study.    Liver:  The liver is diffusely heterogeneous.    Pancreas:  Partially obscured by gas but grossly normal.    Right kidney:   Normal.       Impression    IMPRESSION:    1. Dilated gallbladder and dilated common duct. The cause of the  ductal dilatation is not identified. If clinically indicated, an ERCP  or MRCP could be performed for further evaluation of the biliary  ducts.  2. No gallstones are identified. The gallbladder wall was not  thickened.  3. Diffusely heterogeneous liver.     MARBIN TAO MD   EKG 12 lead   Result Value Ref Range    Interpretation ECG Click View Image link to view waveform and result    Blood culture   Result Value Ref Range    Specimen Description Blood     Culture Micro No growth after 2 hours    Blood culture   Result Value Ref Range    Specimen Description Blood     Special Requests Right Arm     Culture Micro No growth after 2 hours

## 2018-05-13 NOTE — ED NOTES
"..  [facility]  ED Nurse Handoff Report    Berta Nava is a 52 year old female   ED Chief complaint: Abdominal Pain  . ED Diagnosis:   Final diagnoses:   Abdominal pain, epigastric   Intractable vomiting with nausea, unspecified vomiting type   Cholangitis   Hypokalemia   Hyponatremia   Prolonged Q-T interval on ECG     Allergies:   Allergies   Allergen Reactions     Contrast Dye        Code Status: Full Code  Activity level - Baseline/Home:  Independent. Activity Level - Current:   Independent. Lift room needed: No. Bariatric: No   Needed: No   Isolation: No. Infection: Not Applicable.     Vital Signs:   Vitals:    05/13/18 0917 05/13/18 0930 05/13/18 1030 05/13/18 1031   BP:  114/80 (!) 111/95    Resp:  16     Temp:       TempSrc:       SpO2:  95%  98%   Weight: 71.2 kg (157 lb)      Height: 1.651 m (5' 5\")          Cardiac Rhythm:  ,      Pain level: 0-10 Pain Scale: 4  Patient confused: No. Patient Falls Risk: Yes.   Elimination Status: has not voided at time of charting   Patient Report - Initial Complaint: abdominal pain. Focused Assessment: A&Ox4. ABC's intact. Pt c/o upper abdominal pain that has been occurring intermittently for the past, pain started again yesterday.  Pain 7/10 at arrival. Pain 3-4/10 at this time.      Tests Performed: US, labs, EKG. Abnormal Results: ..  Labs Ordered and Resulted from Time of ED Arrival Up to the Time of Departure from the ED   CBC WITH PLATELETS DIFFERENTIAL - Abnormal; Notable for the following:        Result Value    WBC 22.2 (*)     Absolute Neutrophil 20.6 (*)     All other components within normal limits   COMPREHENSIVE METABOLIC PANEL - Abnormal; Notable for the following:     Sodium 131 (*)     Potassium 2.1 (*)     Glucose 121 (*)     Bilirubin Total 3.4 (*)     Albumin 2.1 (*)     Alkaline Phosphatase 441 (*)     ALT 83 (*)      (*)     All other components within normal limits   INR   LIPASE   LACTIC ACID WHOLE BLOOD "   MAGNESIUM   ..  US Abdomen Limited   Preliminary Result   IMPRESSION:     1. Dilated gallbladder and dilated common duct. The cause of the   ductal dilatation is not identified. If clinically indicated, an ERCP   or MRCP could be performed for further evaluation of the biliary   ducts.   2. No gallstones are identified. The gallbladder wall was not   thickened.   3. Diffusely heterogeneous liver.       .   Treatments provided: fluids, dilaudid, zofran, K+  Family Comments: spouse at bedside  OBS brochure/video discussed/provided to patient:  N/A  ED Medications:   Medications   0.9% sodium chloride BOLUS (0 mLs Intravenous Stopped 5/13/18 1051)     Followed by   sodium chloride 0.9% infusion (not administered)   potassium chloride 10 mEq in 100 mL intermittent infusion with 10 mg lidocaine (10 mEq Intravenous New Bag 5/13/18 1051)   piperacillin-tazobactam (ZOSYN) infusion 3.375 g (not administered)   ondansetron (ZOFRAN) injection 4 mg (4 mg Intravenous Given 5/13/18 0929)   HYDROmorphone (PF) (DILAUDID) injection 0.5 mg (0.5 mg Intravenous Given 5/13/18 0932)   potassium chloride SA (K-DUR/KLOR-CON M) CR tablet 20 mEq (20 mEq Oral Given 5/13/18 1051)     Drips infusing:  No  For the majority of the shift, the patient's behavior Green. Interventions performed were NA.     Severe Sepsis OR Septic Shock Diagnosis Present: No      ED Nurse Name/Phone Number: Isabel Whalen,   10:57 AM  RECEIVING UNIT ED HANDOFF REVIEW    Above ED Nurse Handoff Report was reviewed: Yes  Reviewed by: Lolly Coles on May 13, 2018 at 11:22 AM

## 2018-05-13 NOTE — CONSULTS
GASTROENTEROLOGY CONSULTATION      Berta Nava  52 year old female  Admission Date: 5/13/2018  Care Provider: [facility], [place]    I was asked to see this patient in consultation by Dr. Nicole for evaluation of cholangitis.    HPI:  Berta Nava is a 52 year old female who started having fevers and a cough about 10 days ago.  She had vomiting 10 days ago, but just for a day.  She has had temps on and off up to 103 degrees, and states her po intake has been poor.  2 days ago she was started on azithromycin.  At 3:30 this morning, she had severe upper abdominal pain, and she vomited the dinner she ate the night before, without blood.  She had 2 small BMs today without diarrhea, constipation, or blood in her stool.    She was admitted with cholangitis, and given antibiotics in the ER.  Currently she states she is pain free.    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  She had MRI in Jan which showed dilated and irregular bile ducts, which would be more consistent with primary sclerosing cholangitis.  She does have a previous history of ulcerative colitis, which would also favor PSC, although her most recent colonoscopy was normal.       MEDICAL HISTORY:      Patient Active Problem List    Diagnosis Date Noted     Cholangitis 05/13/2018     Priority: Medium     Hypokalemia 05/13/2018     Priority: Medium       A comprehensive ten point review of systems was performed and was negative aside from those in mentioned in the HPI.       :   Prior to Admission Medications   Prescriptions Last Dose Informant Patient Reported? Taking?   Calcium carb-Vitamin D 500 mg [place]-200 units (OSCAL WITH D;OYSTER SHELL CALCIUM) 500-200 MG-UNIT per tablet 5/10/2018 at AM  Yes Yes   Sig: Take 1 tablet by mouth daily   azithromycin (ZITHROMAX) 250 MG tablet 5/12/2018 at AM  Yes Yes   Sig: Take 250 mg by mouth daily Until 5/15/2018   guaiFENesin-codeine  "(ROBITUSSIN AC) 100-10 MG/5ML SOLN 5/11/2018 at Bedtime  Yes Yes   Sig: Take 1 tsp. by mouth nightly as needed for cough   hydrochlorothiazide (HYDRODIURIL) 25 MG tablet 5/12/2018 at am  Yes Yes   Sig: Take 25 mg by mouth daily   lisinopril (PRINIVIL/ZESTRIL) 10 MG tablet 5/12/2018 at AM  Yes Yes   Sig: Take 10 mg by mouth daily   multivitamin, therapeutic (THERA-VIT) TABS tablet 5/10/2018 at AM  Yes Yes   Sig: Take 1 tablet by mouth daily   simvastatin (ZOCOR) 20 MG tablet 5/12/2018 at AM  Yes Yes   Sig: Take 20 mg by mouth At Bedtime   ursodiol (ACTIGALL) 300 MG capsule 5/12/2018 at AM  Yes Yes   Sig: Take 600 mg by mouth 2 times daily      Facility-Administered Medications: None      :   Allergies   Allergen Reactions     Contrast Dye       Social History:  Social History   Substance Use Topics     Smoking status: Never Smoker     Smokeless tobacco: Never Used     Alcohol use No       Family History:  No first degree relative with colon cancer, gastric cancer, crohn's disease, ulcerative colitis, or liver disease.     EXAM:  General Appearance: Alert, oriented x3, no acute distress.  BP 96/62 (BP Location: Left arm)  Pulse 92  Temp 98  F (36.7  C) (Oral)  Resp 16  Ht 1.651 m (5' 5\")  Wt 73.1 kg (161 lb 1.6 oz)  SpO2 99%  BMI 26.81 kg/m2  Eye:  PERRL, positive scleral icterus.  ENT: oropharynx clear, no thrush.  CV: RRR.  Resp: CTA bilaterally.  GI: Soft, NABS, mild RUQ tenderness without rebound  Musculoskeletal: no joint swelling, erythema, or warmth.  Neuro: no asterixis.      Labs and imaging reviewed    Recent Labs   Lab Test  05/13/18   0935   WBC  22.2*   HGB  12.6   MCV  84   PLT  387   INR  1.22*     Recent Labs   Lab Test  05/13/18   0935   POTASSIUM  2.1*   CHLORIDE  95   CO2  25   BUN  13   ANIONGAP  11     Recent Labs   Lab Test  05/13/18   0935   ALBUMIN  2.1*   BILITOTAL  3.4*   ALT  83*   AST  119*   LIPASE  142       ASSESSMENT AND PLAN:  52 year old female with previous diagnosis of " primary biliary cholangitis, who presents with signs and symptoms of cholangitis.  She has had significant improvement with initial dose of antibiotics.  I recommend continued antibiotics, conservative care.  Will try to avoid bile duct manipulation.  If she does not respond to antibiotics, would repeat MRCP to evaluate for stones or dominant stricture.  Will follow.    Thank you for this interesting consult, please feel to call me if any questions arise.    Jerrod Cook MD

## 2018-05-13 NOTE — ED TRIAGE NOTES
A&Ox4. ABC's intact. Pt c/o abdominal pain that has been intermittent for the past few weeks.  Pain started again yesterday.  Hx of ovarian mass removed.  Being treated for pneumonia.  Hx of ascitis

## 2018-05-13 NOTE — IP AVS SNAPSHOT
[place] [id number] [facility]    [address]    [facility] [address]    Phone:  [phone]    Fax:  [phone]                                       After Visit Summary   5/13/2018    Berta Nava    MRN: [id number]           After Visit Summary Signature Page     I have received my discharge instructions, and my questions have been answered. I have discussed any challenges I see with this plan with the nurse or doctor.    ..........................................................................................................................................  Patient/Patient Representative Signature      ..........................................................................................................................................  Patient Representative Print Name and Relationship to Patient    ..................................................               ................................................  Date                                            Time    ..........................................................................................................................................  Reviewed by Signature/Title    ...................................................              ..............................................  Date                                                            Time

## 2018-05-13 NOTE — PLAN OF CARE
Problem: Patient Care Overview  Goal: Plan of Care/Patient Progress Review  Pt a/o x4, up independently.  NPO with ice chips until GI consult.  K+ 2.1, replacing with IV potassium.  Mag also needs to be replaced, on high protocol.  Will continue POC.

## 2018-05-13 NOTE — ED PROVIDER NOTES
History     Chief Complaint:  Abdominal pain    HPI   Berta Nava is a 52 year old female with a history of primary sclerosing cholangitis, and HTN (on HCTZ) who presents to the emergency department with her  for evaluation of abdominal pain. Two weeks ago while lying watching a movie with her daughter, the patient reports she got severe abdominal pain and notes her abdomen was hard and she began vomiting. These symptoms lasted for a few hours. She reports the abdominal pain was intermittent for the next several days and she notes she could not get comfortable with this pain.  She also reports the onset of a cough and a fever starting a few days after the onset of the abdominal pain. She state she was seen in urgent care two days ago for this fever and cough, had a chest xray and labs done (see results below), and was diagnosed with likely pneumonia and started on azithromycin.  She did not have her abdominal pain at the time of this urgent care visit. Then this morning, she reports the onset of additional abdominal pain and notes she started vomiting, had a few bowel movements, and then started to feel constipated. She notes the continuation of abdominal pain through the morning, noting she cannot get comfortable. She also states the pain goes into her back. She went to urgent care for evaluation, who then sent the patient here to the emergency department for further evaluation.    Here, she denies bloody vomit. She denies the continuation of fevers and notes an improvement in her cough since starting the antibiotics. She denies smoking and alcohol. PSHx notable for prior removal of ovarian mass as well as BSO. She notes she does have ascites but denies having problems with this. She sees a GI specialists and sees them once a year and testing every 6 months.       Chest Xray, 5/11/18:  1. Slight bilateral hilar fullness and right paratracheal fullness indeterminate for hilar and mediastinal  "lymphadenopathy. Contrast-enhanced CT of the chest is recommended at this time.  2. Clear lungs. As per radiology.   CBC: WBC: 16.4, HGB: 13.2, PLT: 368      Allergies:  Contrast Dye     Medications:    Azithromycin  Hydrochlorothiazide  Lisinopril  Simvastatin     Past Medical History:    Ascites  Biliary cirrhosis  HTN  choledochitis    Past Surgical History:    Removal of her bilateral fallopian tubes and ovaries    Family History:    No past pertinent family history.    Social History:  Negative for tobacco use.  Negative for alcohol use.  Patient presents with her   Marital Status:       Review of Systems   Respiratory: Positive for cough.    Gastrointestinal: Positive for abdominal pain, constipation and vomiting.   All other systems reviewed and are negative.    Physical Exam   First Vitals:  BP: 117/74  Pulse: 92  Heart Rate: 89  Temp: 98.5  F (36.9  C)  Resp: 20  Height: 165.1 cm (5' 5\")  Weight: 71.2 kg (157 lb)  SpO2: 99 %      Physical Exam  General:                        Well-nourished                        Speaking in full sentences  Eyes:                        Conjunctiva without injection or scleral icterus                        PERRL  ENT:                        Moist mucous membranes                        Posterior oropharynx clear without erythema or exudate                        Nares patent                        Pinnae normal  Neck:                        Full ROM                        No stiffness appreciated  Resp:                        Lungs CTAB                        No crackles, wheezing or audible rubs                        Good air movement  CV:                                        Normal rate, regular rhythm                        S1 and S2 present                        No murmur, gallop or rub  GI:                        BS present                        Abdomen soft without distention                        Tenderness to palpation to RUQ and epigastric " region                        Mild lower abdominal tenderness                        Well healed surgical scars                        No guarding or rebound tenderness  Skin:                        Warm, dry, well perfused                        No rashes or open wounds on exposed skin  MSK:                        Moves all extremities                        No focal deformities or swelling  Neuro:                        Alert                        Answers questions appropriately                        Moves all extremities equally                        Gait stable  Psych:                        Normal affect, normal mood    Emergency Department Course   ECG:   Indication: Abdominal Pain  Time: 1045  Vent. Rate 81 bpm. IA interval 152. QRS duration 94. QT/QTc 410/476. P-R-T axis 0 23 2. Normal sinus rhythm. Low voltage QTS. ST & T wave abnormality, consider inferolateral ischemia. Agrees with computer interpretation.  Read time: 1048.     Imaging:  Radiographic findings were communicated with the patient who voiced understanding of the findings.    US Abdomen, limited:  1. Dilated gallbladder and dilated common duct. The cause of the  ductal dilatation is not identified. If clinically indicated, an ERCP  or MRCP could be performed for further evaluation of the biliary  ducts.  2. No gallstones are identified. The gallbladder wall was not  thickened.  3. Diffusely heterogeneous liver.  As per radiology.     Laboratory:  CBC: WBC: 22.2 (H), HGB: 12.6, PLT: 387  CMP: Glucose 121 (H), Sodium 131 (L), Potassium 2.1 (LL), Albumin 2.1 (L), Bilirubin 3.4 (H), Alkphose 441 (H), ALT 83 (H), AST 1198 (H), o/w WNL (Creatinine: 0.62)  Lipase: 142  0935  Lactic acid whole blood: 1.4  INR: 1.4  Magnesium: 1.9    Interventions:  0929 Zofran, 4 mg, IV injection  0932 Dilaudid 0.5 mg IV  0934 NS 1L IV  1051 Potassium chloride 20 mEq PO   Potassium chloride 10 mg IV infusion  1158 Zosyn 3.375g IV    Emergency Department  Course:  0906 Nursing notes and vitals reviewed.  I performed an exam of the patient as documented above.     IV inserted. Medicine administered as documented above. Blood drawn. This was sent to the lab for further testing, results above.    The patient was sent for a US abdomen limited while in the emergency department, findings above.     1016 I rechecked the patient and discussed the results of her workup thus far. The patient states she is feeling better.     1024 I consulted with TAMANNA Morrissey, [facility], regarding the patient's history and presentation here in the emergency department.    1040 I consulted with Dr. Cook, [facility], regarding the patient's history and presentation here in the emergency department.    1104  I consulted with Dr. Nicole of the hospitalist services. They are in agreement to accept the patient for admission.    Findings and plan explained to the Patient who consents to admission. Discussed the patient with Dr. Nicole, who will admit the patient to an observation bed for further monitoring, evaluation, and treatment.    Impression & Plan    Medical Decision Making:  Berta Nava is a very pleasant 52-year-old female with a history of primary sclerosing cholangitis who presents to the ER from urgent care for evaluation of abdominal pain.  VS on presentation are grossly unremarkable.  Examination notable for a well-appearing female with reproducible tenderness to palpation to the right upper quadrant and epigastric region.  Prior records are reviewed through outside hospitals including visits with gastroenterology and prior liver function tests.  Differential diagnosis for current presentation is broad, including though is not limited to bile duct obstruction, ascending cholangitis, cholecystitis, biliary colic, perforation, spontaneous bacterial peritonitis, pancreatitis, gastritis, peptic ulcer disease, among others.  Presently, I have concern for ascending cholangitis.  This  is supported by upper abdominal discomfort, leukocytosis with a WBC count of 22 (and left shift), increased total bilirubin to 3.4 (baseline values of 0.8-1.0), and dilated common bile duct to 1.0 cm.  On review of prior ultrasound from July of last year, common bile duct was at 0.5 cm.  Patient was empirically treated with Zosyn.  Hemodynamically she is remained in stable condition.  Lactate presently normal.  Patient not meeting criteria for severe sepsis nor septic shock.  She is without AMS, hypotension, nor jaundice.  Case was discussed with GI at [facility] as well as Dr. Cook from [facility].  At this point, he has recommended antibiotic therapy and close monitoring of symptoms and did not feel further imaging with CT, or emergent MRCP/ERCP would be necessary.  They will continue to follow along.  Spontaneous bacterial peritonitis considered although felt to be unlikely.  Patient does not exhibit generalized pain and pain is focal to the right upper quadrant and epigastric region.  Examination also not consistent with perforation.  Ultrasound does not reveal evidence of gallstones nor other signs consistent with acute cholecystitis.  Laboratory evaluation is notable for marked hypokalemia with a potassium of 2.1.  EKG demonstrates sinus rhythm with nonspecific ST-T wave changes and prolonged QTC, likely secondary to hypokalemia.  Magnesium level presently within normal limits.  Oral and IV potassium supplementation provided.  I reviewed the patient's CXR findings with her, again noting bilateral hilar fullness which will require further evaluation and follow-up.  Patient will be admitted to the hospitalist service under the care of  for further treatment and care.  She will require telemetry monitoring given her potassium abnormality and risk for cardiac dysrhythmia.  Patient updated.  Symptoms are under improved control with the above interventions.  All questions answered prior to  admission.    Diagnosis:    ICD-10-CM    1. Abdominal pain, epigastric R10.13    2. Intractable vomiting with nausea, unspecified vomiting type R11.2    3. Cholangitis K83.0    4. Hypokalemia E87.6    5. Hyponatremia E87.1        Disposition:  Admitted to Dr. Corey Wilson Disclosure:  I, Radha Yeh, am serving as a scribe on 5/13/2018 at 9:06 AM to personally document services performed by Scot Harden MD based on my observations and the provider's statements to me.     Radha Yeh  5/13/2018   [facility] EMERGENCY DEPARTMENT       Scot Harden MD  05/13/18 1943

## 2018-05-14 LAB
ALBUMIN SERPL-MCNC: 1.7 G/DL (ref 3.4–5)
ALP SERPL-CCNC: 374 U/L (ref 40–150)
ALT SERPL W P-5'-P-CCNC: 80 U/L (ref 0–50)
ANION GAP SERPL CALCULATED.3IONS-SCNC: 10 MMOL/L (ref 3–14)
AST SERPL W P-5'-P-CCNC: 134 U/L (ref 0–45)
BILIRUB SERPL-MCNC: 4.2 MG/DL (ref 0.2–1.3)
BUN SERPL-MCNC: 10 MG/DL (ref 7–30)
CALCIUM SERPL-MCNC: 8 MG/DL (ref 8.5–10.1)
CHLORIDE SERPL-SCNC: 102 MMOL/L (ref 94–109)
CO2 SERPL-SCNC: 23 MMOL/L (ref 20–32)
CREAT SERPL-MCNC: 0.83 MG/DL (ref 0.52–1.04)
ERYTHROCYTE [DISTWIDTH] IN BLOOD BY AUTOMATED COUNT: 13.8 % (ref 10–15)
GFR SERPL CREATININE-BSD FRML MDRD: 71 ML/MIN/1.7M2
GLUCOSE SERPL-MCNC: 80 MG/DL (ref 70–99)
HCT VFR BLD AUTO: 33.1 % (ref 35–47)
HGB BLD-MCNC: 11.3 G/DL (ref 11.7–15.7)
INTERPRETATION ECG - MUSE: NORMAL
MAGNESIUM SERPL-MCNC: 2.7 MG/DL (ref 1.6–2.3)
MCH RBC QN AUTO: 29.3 PG (ref 26.5–33)
MCHC RBC AUTO-ENTMCNC: 34.1 G/DL (ref 31.5–36.5)
MCV RBC AUTO: 86 FL (ref 78–100)
PLATELET # BLD AUTO: 303 10E9/L (ref 150–450)
POTASSIUM SERPL-SCNC: 2.7 MMOL/L (ref 3.4–5.3)
POTASSIUM SERPL-SCNC: 3.1 MMOL/L (ref 3.4–5.3)
POTASSIUM SERPL-SCNC: 3.2 MMOL/L (ref 3.4–5.3)
POTASSIUM SERPL-SCNC: 3.7 MMOL/L (ref 3.4–5.3)
PROT SERPL-MCNC: 6.9 G/DL (ref 6.8–8.8)
RBC # BLD AUTO: 3.86 10E12/L (ref 3.8–5.2)
SODIUM SERPL-SCNC: 135 MMOL/L (ref 133–144)
WBC # BLD AUTO: 21.1 10E9/L (ref 4–11)

## 2018-05-14 PROCEDURE — 36415 COLL VENOUS BLD VENIPUNCTURE: CPT | Performed by: INTERNAL MEDICINE

## 2018-05-14 PROCEDURE — 83735 ASSAY OF MAGNESIUM: CPT | Performed by: INTERNAL MEDICINE

## 2018-05-14 PROCEDURE — 85027 COMPLETE CBC AUTOMATED: CPT | Performed by: INTERNAL MEDICINE

## 2018-05-14 PROCEDURE — 84132 ASSAY OF SERUM POTASSIUM: CPT | Performed by: INTERNAL MEDICINE

## 2018-05-14 PROCEDURE — 25000125 ZZHC RX 250: Performed by: INTERNAL MEDICINE

## 2018-05-14 PROCEDURE — 80053 COMPREHEN METABOLIC PANEL: CPT | Performed by: INTERNAL MEDICINE

## 2018-05-14 PROCEDURE — 12000007 ZZH R&B INTERMEDIATE

## 2018-05-14 PROCEDURE — 25000128 H RX IP 250 OP 636: Performed by: INTERNAL MEDICINE

## 2018-05-14 PROCEDURE — 99232 SBSQ HOSP IP/OBS MODERATE 35: CPT | Performed by: INTERNAL MEDICINE

## 2018-05-14 PROCEDURE — 25000132 ZZH RX MED GY IP 250 OP 250 PS 637: Performed by: INTERNAL MEDICINE

## 2018-05-14 RX ADMIN — TAZOBACTAM SODIUM AND PIPERACILLIN SODIUM 3.38 G: 375; 3 INJECTION, SOLUTION INTRAVENOUS at 23:46

## 2018-05-14 RX ADMIN — URSODIOL 600 MG: 300 CAPSULE ORAL at 09:15

## 2018-05-14 RX ADMIN — URSODIOL 600 MG: 300 CAPSULE ORAL at 20:48

## 2018-05-14 RX ADMIN — TAZOBACTAM SODIUM AND PIPERACILLIN SODIUM 3.38 G: 375; 3 INJECTION, SOLUTION INTRAVENOUS at 00:12

## 2018-05-14 RX ADMIN — TAZOBACTAM SODIUM AND PIPERACILLIN SODIUM 3.38 G: 375; 3 INJECTION, SOLUTION INTRAVENOUS at 18:12

## 2018-05-14 RX ADMIN — SENNOSIDES AND DOCUSATE SODIUM 1 TABLET: 8.6; 5 TABLET ORAL at 20:51

## 2018-05-14 RX ADMIN — Medication 2 G: at 00:59

## 2018-05-14 RX ADMIN — TAZOBACTAM SODIUM AND PIPERACILLIN SODIUM 3.38 G: 375; 3 INJECTION, SOLUTION INTRAVENOUS at 06:23

## 2018-05-14 RX ADMIN — POTASSIUM CHLORIDE 40 MEQ: 1500 TABLET, EXTENDED RELEASE ORAL at 05:13

## 2018-05-14 RX ADMIN — SODIUM CHLORIDE, POTASSIUM CHLORIDE, SODIUM LACTATE AND CALCIUM CHLORIDE: 600; 310; 30; 20 INJECTION, SOLUTION INTRAVENOUS at 23:47

## 2018-05-14 RX ADMIN — TAZOBACTAM SODIUM AND PIPERACILLIN SODIUM 3.38 G: 375; 3 INJECTION, SOLUTION INTRAVENOUS at 11:32

## 2018-05-14 RX ADMIN — SODIUM CHLORIDE, POTASSIUM CHLORIDE, SODIUM LACTATE AND CALCIUM CHLORIDE: 600; 310; 30; 20 INJECTION, SOLUTION INTRAVENOUS at 13:56

## 2018-05-14 RX ADMIN — POTASSIUM CHLORIDE 20 MEQ: 1500 TABLET, EXTENDED RELEASE ORAL at 17:21

## 2018-05-14 RX ADMIN — POTASSIUM CHLORIDE 20 MEQ: 1500 TABLET, EXTENDED RELEASE ORAL at 11:31

## 2018-05-14 RX ADMIN — OXYCODONE HYDROCHLORIDE 5 MG: 5 TABLET ORAL at 00:15

## 2018-05-14 RX ADMIN — POTASSIUM CHLORIDE 40 MEQ: 1500 TABLET, EXTENDED RELEASE ORAL at 02:52

## 2018-05-14 RX ADMIN — POTASSIUM CHLORIDE 40 MEQ: 1500 TABLET, EXTENDED RELEASE ORAL at 09:32

## 2018-05-14 ASSESSMENT — ACTIVITIES OF DAILY LIVING (ADL)
ADLS_ACUITY_SCORE: 9

## 2018-05-14 NOTE — PLAN OF CARE
Problem: Patient Care Overview  Goal: Plan of Care/Patient Progress Review  Pt a/o x4, up independently in room.  GI saw pt today, being treated with Zosyn.  Currently not c/o any pain.  Tele SR.  K+ 3.2 at 0900, replaced, recheck scheduled for 1600.  Tele SR.  Will continue POC.

## 2018-05-14 NOTE — PLAN OF CARE
Problem: Patient Care Overview  Goal: Plan of Care/Patient Progress Review  Outcome: No Change  VSS, NPO with ice chips, Potassium 2.1, replacing with IV potassium, Mag needs replacing at 1.9. LR @ 100 ml/hr. Tele SR with ST depression, denies pain, Independent in room, Gastro following. Per GI, if no response to antibiotics, will schedule MRCP. Lives at home with  with adult children.

## 2018-05-14 NOTE — PROGRESS NOTES
"GASTROENTEROLOGY PROGRESS NOTE        SUBJECTIVE:  Significant improvement in abdominal pain. No n /v, fever or chills.      OBJECTIVE:    /64 (BP Location: Left arm)  Pulse 92  Temp 96.7  F (35.9  C) (Oral)  Resp 16  Ht 1.651 m (5' 5\")  Wt 74.1 kg (163 lb 6.4 oz)  SpO2 98%  BMI 27.19 kg/m2  Temp (24hrs), Av.9  F (36.6  C), Min:96.7  F (35.9  C), Max:98.5  F (36.9  C)    Patient Vitals for the past 72 hrs:   Weight   18 0624 74.1 kg (163 lb 6.4 oz)   18 1205 73.1 kg (161 lb 1.6 oz)   18 0917 71.2 kg (157 lb)       Intake/Output Summary (Last 24 hours) at 18 0905  Last data filed at 18 0600   Gross per 24 hour   Intake              360 ml   Output                0 ml   Net              360 ml        PHYSICAL EXAM     Constitutional: NAD  Cardiovascular: RRR, normal S1, S2, no murmur appreciated   Respiratory: good transmission, CTAB  Abdomen: + BS, soft, mild RUQ TTP, no rebound or guarding  NEURO: CN 2-12 grossly intact, no focal deficits         Additional Comments:  ROS, FH, SH: See initial GI consult for details.     I have reviewed the patient's new clinical lab results:     Recent Labs   Lab Test  1853  18   0935   WBC  21.1*  22.2*   HGB  11.3*  12.6   MCV  86  84   PLT  303  387   INR   --   1.22*     Recent Labs   Lab Test  1853  18   0057  18   0935   POTASSIUM  3.1*  2.7*  2.1*   CHLORIDE  102   --   95   CO2  23   --   25   BUN  10   --   13   ANIONGAP  10   --   11     Recent Labs   Lab Test  18   0653  18   0935   ALBUMIN  1.7*  2.1*   BILITOTAL  4.2*  3.4*   ALT  80*  83*   AST  134*  119*   LIPASE   --   142     IMAGING    2018 MR Liver W/WO IV contrast ( [facility])  IMPRESSION:    1. No suspicious enhancing hepatic lesions.  2. Cirrhotic appearing liver. The inhomogeneous signal and enhancement is most likely due to confluent areas of fibrosis, and perfusional.  3. Small hepatic cyst.  4. " Dilated and irregular appearing bile ducts. Bile ducts appear more dilated on the current study, although in part this could be due to differences in technique. The enhancement along the bile ducts could be due to inflammation and/or periportal fibrosis.      ASSESSMENT/ PLAN    Berta Nava is a 51 yo female with primary biliary cholangitis and cirrhosis who presented with abdominal pain found to have elevated LFTs.    1. Suspected acute cholangitis / Primary biliary cholangitis with cirrhosis : Dx in 2012 on liver biopsy with evidence of fibrosis. Recent imaging with cirrhosis but no liver lesions. June 2017 EGD without portal HTN or esophageal varices. Maintained on Ursodiol 600 mg BID.   -- Significant clinical improvement in abdominal pain. WBC continues to be elevated 20-22. Afebrile. On Zosyn.   -- Low threshold for MRCP to evaluate for stricture or stones if has any setbacks.   -- Will try to avoid bile duct manipulation.   -- Will need outpatient clinic visit with primary hepatologist Dr. Kaufman.     2. Microscopic colitis : Dx in 2008 on biopsy during colonoscopy. No history of IBD.     3. History of pancreatic cyst : followed by endoscopic ultrasound with resolution per Dr. Kaufman hepatology.       Discussed with Dr. Siva Jama, PA-C  [facility]

## 2018-05-14 NOTE — PROGRESS NOTES
[facility]    Hospitalist Progress Note      Assessment & Plan   Berta Nava is a 52-year-old female with history of hypertension, hypokalemia, biliary cirrhosis, and ascites.  She presented to the emergency department for evaluation of worsening abdominal pain, nausea, and vomiting.  She has been having some abdominal pain off and on for the last 2 or 3 weeks.  This pain was not severe and seemed to wax and wane.  She developed some cough in the last several days for which she was evaluated in urgent care.  She was prescribed Zithromax for possible pneumonia.  She did not have abdominal pain at that time.  The abdominal pain worsened this morning.  She had associated nausea and vomiting.  She went to urgent care for evaluation and was referred to the emergency department.  Emergency department evaluation showed stable vital signs.  Of note, she had temperature of 100.4  at home prior to presentation.  Laboratory evaluation showed leukocytosis with white blood cell count 22.2.  The remainder of the CBC was unremarkable.  Potassium was 2.1 and the remainder of the basic metabolic panel was unremarkable.  Liver function tests were abnormal with total bilirubin of 3.4 (this is usually normal).  Alkaline phosphatase was 441 which is similar to Berta's baseline.  ALT was 83 and AST was 119 which also seem to be similar to Berta's baseline.  Abdominal ultrasound was obtained and showed dilated gallbladder and common bile duct.  No gallstones or gallbladder wall thickening was noted.  Gastroenterology was consulted by phone and suspected acute cholangitis.  Admission for IV antibiotics was recommended.     Problem list:     1.  Suspected acute cholangitis in setting of biliary cirrhosis.  There was no evidence for gallstones or pancreatitis. Pain is better but bilirubin is higher and leukocytosis persists.  GI following.  Continue Zosyn.  Continue clear liquids.  Analgesic and antiemetic medications  are available for use as needed.     2.  Hypokalemia, severe.  This may be due to hydrochlorothiazide.  Hydrochlorothiazide as been stopped.  Replace potassium per protocol.  Monitor on telemetry until potassium has normalized.     3.  Hypertension.  Continue prior to admission lisinopril 10 mg by mouth daily.  I stopped hydrochlorothiazide with recurrent hypokalemia.  Lisinopril dose may need to need to be increased in absence of hydrochlorothiazide.      4.  Biliary cirrhosis with chronic elevations of alkaline phosphatase, ALT, and AST.    # Pain Assessment:  Current Pain Score 5/14/2018   Patient currently in pain? denies   Pain score (0-10) -   Pain location -   Pain descriptors -   Berta kapoor pain level was assessed and she currently denies pain.      DVT Prophylaxis: Pneumatic Compression Devices  Code Status: Full Code    Disposition: Expected discharge in ? days once WBC and LFT's improving.    Zafar Nicole    Interval History   No new problems.  Ab pain is better.  No fevers. No nausea or vomiting.     -Data reviewed today: I reviewed all new labs and imaging results over the last 24 hours. I personally reviewed no images or EKG's today.    Physical Exam   Temp: 97.2  F (36.2  C) Temp src: Oral BP: 102/65   Heart Rate: 81 Resp: 16 SpO2: 98 % O2 Device: None (Room air)    Vitals:    05/13/18 0917 05/13/18 1205 05/14/18 0624   Weight: 71.2 kg (157 lb) 73.1 kg (161 lb 1.6 oz) 74.1 kg (163 lb 6.4 oz)     Vital Signs with Ranges  Temp:  [96.7  F (35.9  C)-98  F (36.7  C)] 97.2  F (36.2  C)  Heart Rate:  [77-87] 81  Resp:  [16] 16  BP: ()/(62-65) 102/65  SpO2:  [98 %-99 %] 98 %  I/O last 3 completed shifts:  In: 360 [P.O.:360]  Out: -     GENERAL:  Comfortable. Cooperative.  PSYCH: pleasant, oriented, No acute distress.  EYES: PERRLA, Normal conjunctiva.  HEART:  Regular rate and rhythm. No JVD. Pulses normal. No edema.  LUNGS:  Clear to auscultation, normal Respiratory effort.  ABDOMEN:  Soft, no  hepatosplenomegaly, normal bowel sounds.  EXTREMETIES: No clubbing, cyanosis or ischemia  SKIN:  Dry to touch, No rash.       Medications     lactated ringers 100 mL/hr at 05/14/18 1356       lisinopril  10 mg Oral Daily     piperacillin-tazobactam  3.375 g Intravenous Q6H     ursodiol  600 mg Oral BID       Data     Recent Labs  Lab 05/14/18  0902 05/14/18  0653 05/14/18  0057 05/13/18  0935   WBC  --  21.1*  --  22.2*   HGB  --  11.3*  --  12.6   MCV  --  86  --  84   PLT  --  303  --  387   INR  --   --   --  1.22*   NA  --  135  --  131*   POTASSIUM 3.2* 3.1* 2.7* 2.1*   CHLORIDE  --  102  --  95   CO2  --  23  --  25   BUN  --  10  --  13   CR  --  0.83  --  0.62   ANIONGAP  --  10  --  11   MARIELENA  --  8.0*  --  8.5   GLC  --  80  --  121*   ALBUMIN  --  1.7*  --  2.1*   PROTTOTAL  --  6.9  --  8.3   BILITOTAL  --  4.2*  --  3.4*   ALKPHOS  --  374*  --  441*   ALT  --  80*  --  83*   AST  --  134*  --  119*   LIPASE  --   --   --  142       No results found for this or any previous visit (from the past 24 hour(s)).

## 2018-05-14 NOTE — PLAN OF CARE
"Problem: Patient Care Overview  Goal: Plan of Care/Patient Progress Review  Pt VSS, Afebrile  /64 (BP Location: Left arm)  Pulse 92  Temp 96.7  F (35.9  C) (Oral)  Resp 16  Ht 1.651 m (5' 5\")  Wt 73.1 kg (161 lb 1.6 oz)  SpO2 98%  BMI 26.81 kg/m2   Abd pain 5/10 at the beginning of shift, 5mg oxycodone given.pt denied pain for the rest of the shift   Potassium replaced to 2.7 on recheck. 80mg PO given with recheck scheduled at 0900  Mg 1.9 replaced with recheck for 0600  Pt independent in room  On IV Zosyn      "

## 2018-05-15 LAB
ALBUMIN SERPL-MCNC: 1.9 G/DL (ref 3.4–5)
ALP SERPL-CCNC: 417 U/L (ref 40–150)
ALT SERPL W P-5'-P-CCNC: 87 U/L (ref 0–50)
ANION GAP SERPL CALCULATED.3IONS-SCNC: 11 MMOL/L (ref 3–14)
AST SERPL W P-5'-P-CCNC: 115 U/L (ref 0–45)
BILIRUB DIRECT SERPL-MCNC: 2.6 MG/DL (ref 0–0.2)
BILIRUB SERPL-MCNC: 3 MG/DL (ref 0.2–1.3)
BUN SERPL-MCNC: 11 MG/DL (ref 7–30)
CALCIUM SERPL-MCNC: 8.7 MG/DL (ref 8.5–10.1)
CHLORIDE SERPL-SCNC: 103 MMOL/L (ref 94–109)
CO2 SERPL-SCNC: 20 MMOL/L (ref 20–32)
CREAT SERPL-MCNC: 0.61 MG/DL (ref 0.52–1.04)
ERYTHROCYTE [DISTWIDTH] IN BLOOD BY AUTOMATED COUNT: 14.3 % (ref 10–15)
GFR SERPL CREATININE-BSD FRML MDRD: >90 ML/MIN/1.7M2
GLUCOSE SERPL-MCNC: 68 MG/DL (ref 70–99)
HCT VFR BLD AUTO: 37.1 % (ref 35–47)
HGB BLD-MCNC: 12.5 G/DL (ref 11.7–15.7)
MCH RBC QN AUTO: 29.2 PG (ref 26.5–33)
MCHC RBC AUTO-ENTMCNC: 33.7 G/DL (ref 31.5–36.5)
MCV RBC AUTO: 87 FL (ref 78–100)
PLATELET # BLD AUTO: 375 10E9/L (ref 150–450)
POTASSIUM SERPL-SCNC: 3.3 MMOL/L (ref 3.4–5.3)
POTASSIUM SERPL-SCNC: 3.6 MMOL/L (ref 3.4–5.3)
PROT SERPL-MCNC: 7.9 G/DL (ref 6.8–8.8)
RBC # BLD AUTO: 4.28 10E12/L (ref 3.8–5.2)
SODIUM SERPL-SCNC: 134 MMOL/L (ref 133–144)
WBC # BLD AUTO: 16 10E9/L (ref 4–11)

## 2018-05-15 PROCEDURE — 99232 SBSQ HOSP IP/OBS MODERATE 35: CPT | Performed by: INTERNAL MEDICINE

## 2018-05-15 PROCEDURE — 36415 COLL VENOUS BLD VENIPUNCTURE: CPT | Performed by: INTERNAL MEDICINE

## 2018-05-15 PROCEDURE — 85027 COMPLETE CBC AUTOMATED: CPT | Performed by: INTERNAL MEDICINE

## 2018-05-15 PROCEDURE — 80048 BASIC METABOLIC PNL TOTAL CA: CPT | Performed by: INTERNAL MEDICINE

## 2018-05-15 PROCEDURE — 25000132 ZZH RX MED GY IP 250 OP 250 PS 637: Performed by: INTERNAL MEDICINE

## 2018-05-15 PROCEDURE — 25000128 H RX IP 250 OP 636: Performed by: INTERNAL MEDICINE

## 2018-05-15 PROCEDURE — 84132 ASSAY OF SERUM POTASSIUM: CPT | Performed by: INTERNAL MEDICINE

## 2018-05-15 PROCEDURE — 12000007 ZZH R&B INTERMEDIATE

## 2018-05-15 PROCEDURE — 80076 HEPATIC FUNCTION PANEL: CPT | Performed by: INTERNAL MEDICINE

## 2018-05-15 RX ADMIN — TAZOBACTAM SODIUM AND PIPERACILLIN SODIUM 3.38 G: 375; 3 INJECTION, SOLUTION INTRAVENOUS at 12:10

## 2018-05-15 RX ADMIN — URSODIOL 600 MG: 300 CAPSULE ORAL at 08:43

## 2018-05-15 RX ADMIN — POTASSIUM CHLORIDE 20 MEQ: 1500 TABLET, EXTENDED RELEASE ORAL at 12:06

## 2018-05-15 RX ADMIN — TAZOBACTAM SODIUM AND PIPERACILLIN SODIUM 3.38 G: 375; 3 INJECTION, SOLUTION INTRAVENOUS at 06:08

## 2018-05-15 RX ADMIN — TAZOBACTAM SODIUM AND PIPERACILLIN SODIUM 3.38 G: 375; 3 INJECTION, SOLUTION INTRAVENOUS at 17:58

## 2018-05-15 RX ADMIN — POTASSIUM CHLORIDE 40 MEQ: 1500 TABLET, EXTENDED RELEASE ORAL at 08:44

## 2018-05-15 RX ADMIN — POTASSIUM CHLORIDE 20 MEQ: 1500 TABLET, EXTENDED RELEASE ORAL at 17:04

## 2018-05-15 RX ADMIN — URSODIOL 600 MG: 300 CAPSULE ORAL at 20:53

## 2018-05-15 ASSESSMENT — ACTIVITIES OF DAILY LIVING (ADL)
ADLS_ACUITY_SCORE: 9

## 2018-05-15 NOTE — PROGRESS NOTES
[facility]    Hospitalist Progress Note      Assessment & Plan   Berta Nava is a 52-year-old female with history of hypertension, hypokalemia, biliary cirrhosis, and ascites.  She presented to the emergency department for evaluation of worsening abdominal pain, nausea, and vomiting.  She has been having some abdominal pain off and on for the last 2 or 3 weeks.  This pain was not severe and seemed to wax and wane.  She developed some cough in the last several days for which she was evaluated in urgent care.  She was prescribed Zithromax for possible pneumonia.  She did not have abdominal pain at that time.  The abdominal pain worsened this morning.  She had associated nausea and vomiting.  She went to urgent care for evaluation and was referred to the emergency department.  Emergency department evaluation showed stable vital signs.  Of note, she had temperature of 100.4  at home prior to presentation.  Laboratory evaluation showed leukocytosis with white blood cell count 22.2.  The remainder of the CBC was unremarkable.  Potassium was 2.1 and the remainder of the basic metabolic panel was unremarkable.  Liver function tests were abnormal with total bilirubin of 3.4 (this is usually normal).  Alkaline phosphatase was 441 which is similar to Berta's baseline.  ALT was 83 and AST was 119 which also seem to be similar to Berta's baseline.  Abdominal ultrasound was obtained and showed dilated gallbladder and common bile duct.  No gallstones or gallbladder wall thickening was noted.  Gastroenterology was consulted by phone and suspected acute cholangitis.  Admission for IV antibiotics was recommended.     Problem list:     1.  Acute cholangitis in setting of biliary cirrhosis.  There was no evidence for gallstones or pancreatitis. Pain is better but bilirubin is higher and leukocytosis persists.  GI following.  Continue Zosyn.  Continue clear liquids.  Analgesic and antiemetic medications are  available for use as needed.  --better on Zosyn  --clears today  --consider change to oral antibiotics, possibly Augmentin today or tomorrow     2.  Hypokalemia, severe.  This may be due to hydrochlorothiazide.  Hydrochlorothiazide as been stopped.  Replace potassium per protocol.  Monitor on telemetry until potassium has normalized.     3.  Hypertension.  Continue prior to admission lisinopril 10 mg by mouth daily.  I stopped hydrochlorothiazide with recurrent hypokalemia.  Lisinopril dose may need to need to be increased in absence of hydrochlorothiazide. So blood pressure has been soft.      4.  Biliary cirrhosis with chronic elevations of alkaline phosphatase, ALT, and AST.    # Pain Assessment:  Current Pain Score 5/14/2018   Patient currently in pain? denies   Pain score (0-10) -   Pain location -   Pain descriptors -   Berta kapoor pain level was assessed and she currently denies pain.      DVT Prophylaxis: Pneumatic Compression Devices  Code Status: Full Code    Disposition: Expected discharge in 1-2 days when tolerating oral and antibiotics.      James Wolf    Interval History   Pain and nausea a lot better.  Tolerating clears thus far.  No short of breath.  No fevers.      -Data reviewed today: I reviewed all new labs and imaging results over the last 24 hours. I personally reviewed no images or EKG's today.    Physical Exam   Temp: 96.5  F (35.8  C) Temp src: Oral BP: 110/72   Heart Rate: 95 Resp: 16 SpO2: 100 % O2 Device: None (Room air)    Vitals:    05/13/18 1205 05/14/18 0624 05/15/18 0646   Weight: 73.1 kg (161 lb 1.6 oz) 74.1 kg (163 lb 6.4 oz) 75.6 kg (166 lb 11.2 oz)     Vital Signs with Ranges  Temp:  [96.1  F (35.6  C)-97.9  F (36.6  C)] 96.5  F (35.8  C)  Heart Rate:  [80-95] 95  Resp:  [16] 16  BP: (100-110)/(66-72) 110/72  SpO2:  [97 %-100 %] 100 %  I/O last 3 completed shifts:  In: 4050 [I.V.:4050]  Out: -     GENERAL:  Comfortable. Cooperative.nad   PSYCH: pleasant, oriented, No acute  distress.  EYES: PERRLA, Normal conjunctiva.  HEART:  Regular rate and rhythm. No JVD. Pulses normal. No edema.  LUNGS:  Clear to auscultation, normal Respiratory effort.  ABDOMEN:  Soft, no hepatosplenomegaly, normal bowel sounds.  EXTREMETIES: No clubbing, cyanosis or ischemia  SKIN:  Dry to touch, No rash.       Medications       lisinopril  10 mg Oral Daily     piperacillin-tazobactam  3.375 g Intravenous Q6H     ursodiol  600 mg Oral BID       Data     Recent Labs  Lab 05/15/18  0817 05/14/18  1618 05/14/18  0902 05/14/18  0653  05/13/18  0935   WBC 16.0*  --   --  21.1*  --  22.2*   HGB 12.5  --   --  11.3*  --  12.6   MCV 87  --   --  86  --  84     --   --  303  --  387   INR  --   --   --   --   --  1.22*     --   --  135  --  131*   POTASSIUM 3.3* 3.7 3.2* 3.1*  < > 2.1*   CHLORIDE 103  --   --  102  --  95   CO2 20  --   --  23  --  25   BUN 11  --   --  10  --  13   CR 0.61  --   --  0.83  --  0.62   ANIONGAP 11  --   --  10  --  11   MARIELENA 8.7  --   --  8.0*  --  8.5   GLC 68*  --   --  80  --  121*   ALBUMIN 1.9*  --   --  1.7*  --  2.1*   PROTTOTAL 7.9  --   --  6.9  --  8.3   BILITOTAL 3.0*  --   --  4.2*  --  3.4*   ALKPHOS 417*  --   --  374*  --  441*   ALT 87*  --   --  80*  --  83*   *  --   --  134*  --  119*   LIPASE  --   --   --   --   --  142   < > = values in this interval not displayed.    No results found for this or any previous visit (from the past 24 hour(s)).

## 2018-05-15 NOTE — PLAN OF CARE
End of Shift Summary.  For vital signs and complete assessments, please see documentation flowsheets.     Pertinent assessments: pt alert and oriented, up ad indy in the room. Pt had 2 bowel movements overnight after taking senna last evening. Pt denies pain. Pt remains NPO until liver labs stabilize  Major Shift Events: none  Plan (Upcoming Events): continue current cares  Discharge/Transfer Needs: none    Bedside Shift Report Completed :   Bedside Safety Check Completed:

## 2018-05-15 NOTE — PLAN OF CARE
Problem: Patient Care Overview  Goal: Plan of Care/Patient Progress Review  Outcome: No Change  VSS, A&O, NPO with ice chips, Independent in room, Potassium recheck was 3.7, replacement given 20meq, recheck in AM. Mag 2.7. Bilirubin 4.2, WBC down a little at 21.1. Gastro following, Gave Senna for no report of BM since 5/11. D/C home once WBC and LFT's stabilize more.

## 2018-05-15 NOTE — PROGRESS NOTES
"GASTROENTEROLOGY PROGRESS NOTE        SUBJECTIVE:  No abdominal pain. No n /v, fever or chills. Hoping to try clears.      OBJECTIVE:    /72 (BP Location: Left arm)  Pulse 92  Temp 96.5  F (35.8  C) (Oral)  Resp 16  Ht 1.651 m (5' 5\")  Wt 75.6 kg (166 lb 11.2 oz)  SpO2 100%  BMI 27.74 kg/m2  Temp (24hrs), Av.9  F (36.6  C), Min:96.7  F (35.9  C), Max:98.5  F (36.9  C)    Patient Vitals for the past 72 hrs:   Weight   05/15/18 0646 75.6 kg (166 lb 11.2 oz)   18 0624 74.1 kg (163 lb 6.4 oz)   18 1205 73.1 kg (161 lb 1.6 oz)   18 0917 71.2 kg (157 lb)       Intake/Output Summary (Last 24 hours) at 18 0905  Last data filed at 18 0600   Gross per 24 hour   Intake              360 ml   Output                0 ml   Net              360 ml        PHYSICAL EXAM     Constitutional: NAD  Cardiovascular: RRR, normal S1, S2, no murmur appreciated   Respiratory: good transmission, CTAB  Abdomen: + BS, soft, non tender, no rebound or guarding  NEURO: CN 2-12 grossly intact, no focal deficits         Additional Comments:  ROS, FH, SH: See initial GI consult for details.     I have reviewed the patient's new clinical lab results:     Recent Labs   Lab Test  05/15/18   0818   0653  18   0935   WBC  16.0*  21.1*  22.2*   HGB  12.5  11.3*  12.6   MCV  87  86  84   PLT  375  303  387   INR   --    --   1.22*     Recent Labs   Lab Test  05/15/18   0818   1618  18   0902  18   0653   18   0935   POTASSIUM  3.3*  3.7  3.2*  3.1*   < >  2.1*   CHLORIDE  103   --    --   102   --   95   CO2  20   --    --   23   --   25   BUN  11   --    --   10   --   13   ANIONGAP  11   --    --   10   --   11    < > = values in this interval not displayed.     Recent Labs   Lab Test  05/15/18   0817  18   0653  18   0935   ALBUMIN  1.9*  1.7*  2.1*   BILITOTAL  3.0*  4.2*  3.4*   ALT  87*  80*  83*   AST  115*  134*  119*   LIPASE   --    --   142 "     IMAGING    1[facility]2018 [facility]  IMPRESSION:    1. No suspicious enhancing hepatic lesions.  2. Cirrhotic appearing liver. The inhomogeneous signal and enhancement is most likely due to confluent areas of fibrosis, and perfusional.  3. Small hepatic cyst.  4. Dilated and irregular appearing bile ducts. Bile ducts appear more dilated on the current study, although in part this could be due to differences in technique. The enhancement along the bile ducts could be due to inflammation and/or periportal fibrosis.      ASSESSMENT/ PLAN    Berta Nava is a 51 yo female with primary biliary cholangitis and cirrhosis who presented with abdominal pain found to have elevated LFTs.    1. Suspected acute cholangitis / Primary biliary cholangitis with cirrhosis : Dx in 2012 on liver biopsy with evidence of fibrosis. Recent imaging with cirrhosis but no liver lesions. June 2017 EGD without portal HTN or esophageal varices. Maintained on Ursodiol 600 mg BID.   -- Significant clinical improvement in abdominal pain. WBC improved to 16. Afebrile. On Zosyn. Suggest 10 days of antibiotics on discharge.   -- Low threshold for MRCP to evaluate for stricture or stones if has any setbacks.   -- Will try to avoid bile duct manipulation.   -- Will need outpatient clinic visit with primary hepatologist Dr. Kaufman.     2. Microscopic colitis : Dx in 2008 on biopsy during colonoscopy. No history of IBD.     3. History of pancreatic cyst : followed by endoscopic ultrasound with resolution per Dr. Kaufman hepatology.       Discussed with Dr. Siva Jama, PA-C  [facility]

## 2018-05-15 NOTE — PROVIDER NOTIFICATION
Do we want to continue fluids? Has , tolerating clears. Pt wt is up 9 lb since admission, and 3 lbs since yesterday. Denies SOB, feels puffy. Also can we add parameters on PO lisinopril, BP have been soft. thank you

## 2018-05-15 NOTE — PLAN OF CARE
Problem: Patient Care Overview  Goal: Plan of Care/Patient Progress Review  Outcome: No Change  AO, VSS. Tolerating clears. Tele SR. K replaced 2x, recheck in am.

## 2018-05-16 LAB
ALBUMIN SERPL-MCNC: 1.6 G/DL (ref 3.4–5)
ALP SERPL-CCNC: 344 U/L (ref 40–150)
ALT SERPL W P-5'-P-CCNC: 71 U/L (ref 0–50)
ANION GAP SERPL CALCULATED.3IONS-SCNC: 10 MMOL/L (ref 3–14)
AST SERPL W P-5'-P-CCNC: 92 U/L (ref 0–45)
BILIRUB DIRECT SERPL-MCNC: 1.6 MG/DL (ref 0–0.2)
BILIRUB SERPL-MCNC: 2 MG/DL (ref 0.2–1.3)
BUN SERPL-MCNC: 7 MG/DL (ref 7–30)
C DIFF TOX B STL QL: NEGATIVE
CALCIUM SERPL-MCNC: 8.4 MG/DL (ref 8.5–10.1)
CHLORIDE SERPL-SCNC: 109 MMOL/L (ref 94–109)
CO2 SERPL-SCNC: 19 MMOL/L (ref 20–32)
CREAT SERPL-MCNC: 0.6 MG/DL (ref 0.52–1.04)
GFR SERPL CREATININE-BSD FRML MDRD: >90 ML/MIN/1.7M2
GLUCOSE SERPL-MCNC: 73 MG/DL (ref 70–99)
PHOSPHATE SERPL-MCNC: 2.6 MG/DL (ref 2.5–4.5)
POTASSIUM SERPL-SCNC: 3.8 MMOL/L (ref 3.4–5.3)
PROT SERPL-MCNC: 6.8 G/DL (ref 6.8–8.8)
SODIUM SERPL-SCNC: 138 MMOL/L (ref 133–144)
SPECIMEN SOURCE: NORMAL
WBC # BLD AUTO: 10.2 10E9/L (ref 4–11)

## 2018-05-16 PROCEDURE — 25000128 H RX IP 250 OP 636: Performed by: INTERNAL MEDICINE

## 2018-05-16 PROCEDURE — 84460 ALANINE AMINO (ALT) (SGPT): CPT | Performed by: INTERNAL MEDICINE

## 2018-05-16 PROCEDURE — 85048 AUTOMATED LEUKOCYTE COUNT: CPT | Performed by: INTERNAL MEDICINE

## 2018-05-16 PROCEDURE — 82248 BILIRUBIN DIRECT: CPT | Performed by: INTERNAL MEDICINE

## 2018-05-16 PROCEDURE — 84450 TRANSFERASE (AST) (SGOT): CPT | Performed by: INTERNAL MEDICINE

## 2018-05-16 PROCEDURE — 12000007 ZZH R&B INTERMEDIATE

## 2018-05-16 PROCEDURE — 25000132 ZZH RX MED GY IP 250 OP 250 PS 637: Performed by: INTERNAL MEDICINE

## 2018-05-16 PROCEDURE — 80069 RENAL FUNCTION PANEL: CPT | Performed by: INTERNAL MEDICINE

## 2018-05-16 PROCEDURE — 82247 BILIRUBIN TOTAL: CPT | Performed by: INTERNAL MEDICINE

## 2018-05-16 PROCEDURE — 84075 ASSAY ALKALINE PHOSPHATASE: CPT | Performed by: INTERNAL MEDICINE

## 2018-05-16 PROCEDURE — 99232 SBSQ HOSP IP/OBS MODERATE 35: CPT | Performed by: INTERNAL MEDICINE

## 2018-05-16 PROCEDURE — 36415 COLL VENOUS BLD VENIPUNCTURE: CPT | Performed by: INTERNAL MEDICINE

## 2018-05-16 PROCEDURE — 84155 ASSAY OF PROTEIN SERUM: CPT | Performed by: INTERNAL MEDICINE

## 2018-05-16 PROCEDURE — 87493 C DIFF AMPLIFIED PROBE: CPT | Performed by: INTERNAL MEDICINE

## 2018-05-16 RX ORDER — FUROSEMIDE 10 MG/ML
20 INJECTION INTRAMUSCULAR; INTRAVENOUS ONCE
Status: COMPLETED | OUTPATIENT
Start: 2018-05-16 | End: 2018-05-16

## 2018-05-16 RX ADMIN — TAZOBACTAM SODIUM AND PIPERACILLIN SODIUM 3.38 G: 375; 3 INJECTION, SOLUTION INTRAVENOUS at 06:44

## 2018-05-16 RX ADMIN — TAZOBACTAM SODIUM AND PIPERACILLIN SODIUM 3.38 G: 375; 3 INJECTION, SOLUTION INTRAVENOUS at 01:26

## 2018-05-16 RX ADMIN — AMOXICILLIN AND CLAVULANATE POTASSIUM 1 TABLET: 875; 125 TABLET, FILM COATED ORAL at 20:56

## 2018-05-16 RX ADMIN — FUROSEMIDE 20 MG: 10 INJECTION, SOLUTION INTRAVENOUS at 12:48

## 2018-05-16 RX ADMIN — URSODIOL 600 MG: 300 CAPSULE ORAL at 20:56

## 2018-05-16 RX ADMIN — URSODIOL 600 MG: 300 CAPSULE ORAL at 08:16

## 2018-05-16 RX ADMIN — POTASSIUM CHLORIDE 20 MEQ: 1500 TABLET, EXTENDED RELEASE ORAL at 08:16

## 2018-05-16 RX ADMIN — AMOXICILLIN AND CLAVULANATE POTASSIUM 1 TABLET: 875; 125 TABLET, FILM COATED ORAL at 12:45

## 2018-05-16 ASSESSMENT — ACTIVITIES OF DAILY LIVING (ADL)
ADLS_ACUITY_SCORE: 9

## 2018-05-16 NOTE — PLAN OF CARE
Problem: Pain, Acute (Adult)  Goal: Identify Related Risk Factors and Signs and Symptoms  Related risk factors and signs and symptoms are identified upon initiation of Human Response Clinical Practice Guideline (CPG).   Outcome: No Change  Ao, vss. Tolerating diet. Having loose stools-sample sent to lab. K replaced recheck in AM.Tele SR

## 2018-05-16 NOTE — PROGRESS NOTES
"GASTROENTEROLOGY PROGRESS NOTE        SUBJECTIVE:  No abdominal pain. No n /v, fever or chills. Tolerating clears. Some LE edema.      OBJECTIVE:    BP 98/60 (BP Location: Left arm)  Pulse 92  Temp 98.2  F (36.8  C) (Oral)  Resp 16  Ht 1.651 m (5' 5\")  Wt 75.4 kg (166 lb 3.2 oz)  SpO2 96%  BMI 27.66 kg/m2  Temp (24hrs), Av.9  F (36.6  C), Min:96.7  F (35.9  C), Max:98.5  F (36.9  C)    Patient Vitals for the past 72 hrs:   Weight   18 0700 75.4 kg (166 lb 3.2 oz)   05/15/18 0646 75.6 kg (166 lb 11.2 oz)   18 0624 74.1 kg (163 lb 6.4 oz)   18 1205 73.1 kg (161 lb 1.6 oz)       Intake/Output Summary (Last 24 hours) at 18 0905  Last data filed at 18 0600   Gross per 24 hour   Intake              360 ml   Output                0 ml   Net              360 ml        PHYSICAL EXAM     Constitutional: NAD  Cardiovascular: RRR, normal S1, S2, no murmur appreciated   Respiratory: good transmission, CTAB  Abdomen: + BS, soft, non tender, no rebound or guarding  NEURO: CN 2-12 grossly intact, no focal deficits         Additional Comments:  ROS, FH, SH: See initial GI consult for details.     I have reviewed the patient's new clinical lab results:     Recent Labs   Lab Test  18   0650  05/15/18   0817  18   0653  18   0935   WBC  10.2  16.0*  21.1*  22.2*   HGB   --   12.5  11.3*  12.6   MCV   --   87  86  84   PLT   --   375  303  387   INR   --    --    --   1.22*     Recent Labs   Lab Test  18   0650  05/15/18   1620  05/15/18   0817   18   0653   POTASSIUM  3.8  3.6  3.3*   < >  3.1*   CHLORIDE  109   --   103   --   102   CO2  19*   --   20   --   23   BUN  7   --   11   --   10   ANIONGAP  10   --   11   --   10    < > = values in this interval not displayed.     Recent Labs   Lab Test  18   0650  05/15/18   0817  18   0653  18   0935   ALBUMIN  1.6*  1.9*  1.7*  2.1*   BILITOTAL   --   3.0*  4.2*  3.4*   ALT   --   87*  80*  83*   AST "   --   115*  134*  119*   LIPASE   --    --    --   142     IMAGING    1/22/2018 MR Liver W/WO IV contrast ( [facility])  IMPRESSION:    1. No suspicious enhancing hepatic lesions.  2. Cirrhotic appearing liver. The inhomogeneous signal and enhancement is most likely due to confluent areas of fibrosis, and perfusional.  3. Small hepatic cyst.  4. Dilated and irregular appearing bile ducts. Bile ducts appear more dilated on the current study, although in part this could be due to differences in technique. The enhancement along the bile ducts could be due to inflammation and/or periportal fibrosis.      ASSESSMENT/ PLAN    Berta Nava is a 51 yo female with primary biliary cholangitis and cirrhosis who presented with abdominal pain found to have elevated LFTs.    1. Suspected acute cholangitis / Primary biliary cholangitis with cirrhosis : Dx in 2012 on liver biopsy with evidence of fibrosis. Recent imaging with cirrhosis but no liver lesions. June 2017 EGD without portal HTN or esophageal varices. Maintained on Ursodiol 600 mg BID.     -- Significant clinical improvement in abdominal pain. WBC improved to 10Afebrile. On Zosyn. Suggest 10 days of antibiotics on discharge.   -- Low threshold for MRCP to evaluate for stricture or stones if has any setbacks. Total bilirubin improving at 3.  -- Will try to avoid bile duct manipulation.   -- Will need outpatient clinic visit with primary hepatologist Dr. Kaufman.     2. Microscopic colitis : Dx in 2008 on biopsy during colonoscopy. No history of IBD.     3. History of pancreatic cyst : followed by endoscopic ultrasound with resolution per Dr. Kaufman hepatology.       Discussed with Dr. Siva Jama, PA-C  [facility]

## 2018-05-16 NOTE — PLAN OF CARE
Problem: Patient Care Overview  Goal: Plan of Care/Patient Progress Review  Outcome: No Change   05/14/18 2022 05/14/18 2120   OTHER   Plan Of Care Reviewed With --  patient   Plan of Care Review   Progress no change --    Tele: SR, HR: 83-84. Physical assessment WDL w/exceptions as noted on Adult PCS flowsheet. Refer to VS, I/O, Adult PCS for full assessment & shift details. Disposition anticipated for 1-2 days.  Marlene Grover, PEBBLESN, RN  Medical/Telemetry - 3

## 2018-05-16 NOTE — PROGRESS NOTES
[facility]    Hospitalist Progress Note      Assessment & Plan   Berta Nava is a 52-year-old female with history of hypertension, hypokalemia, biliary cirrhosis, and ascites.  She presented to the emergency department for evaluation of worsening abdominal pain, nausea, and vomiting.  She has been having some abdominal pain off and on for the last 2 or 3 weeks.  This pain was not severe and seemed to wax and wane.  She developed some cough in the last several days for which she was evaluated in urgent care.  She was prescribed Zithromax for possible pneumonia.  She did not have abdominal pain at that time.  The abdominal pain worsened this morning.  She had associated nausea and vomiting.  She went to urgent care for evaluation and was referred to the emergency department.  Emergency department evaluation showed stable vital signs.  Of note, she had temperature of 100.4  at home prior to presentation.  Laboratory evaluation showed leukocytosis with white blood cell count 22.2.  The remainder of the CBC was unremarkable.  Potassium was 2.1 and the remainder of the basic metabolic panel was unremarkable.  Liver function tests were abnormal with total bilirubin of 3.4 (this is usually normal).  Alkaline phosphatase was 441 which is similar to Berta's baseline.  ALT was 83 and AST was 119 which also seem to be similar to Berta's baseline.  Abdominal ultrasound was obtained and showed dilated gallbladder and common bile duct.  No gallstones or gallbladder wall thickening was noted.  Gastroenterology was consulted by phone and suspected acute cholangitis.  Admission for IV antibiotics was recommended.     Problem list:     1.  Acute cholangitis in setting of biliary cirrhosis.  There was no evidence for gallstones or pancreatitis. Pain is better but bilirubin is higher and leukocytosis persists.  GI following.  Continue Zosyn.  Continue clear liquids.  Analgesic and antiemetic medications are  available for use as needed.  --Changed to Augmentin today  --Advanced to low fiber     2.  Hypokalemia, severe.  This may be due to hydrochlorothiazide.  Hydrochlorothiazide as been stopped.  Replace potassium per protocol.  Monitor on telemetry until potassium has normalized.     3.  Hypertension.  Continue prior to admission lisinopril 10 mg by mouth daily.  I stopped hydrochlorothiazide with recurrent hypokalemia.  Lisinopril dose may need to need to be increased in absence of hydrochlorothiazide. Blood pressure has been soft.      4.  Biliary cirrhosis with chronic elevations of alkaline phosphatase, ALT, and AST.    # Pain Assessment:  Current Pain Score 5/16/2018   Patient currently in pain? denies   Pain score (0-10) 0   Pain location -   Pain descriptors -   Berta kapoor pain level was assessed and she currently denies pain.      DVT Prophylaxis: Pneumatic Compression Devices  Code Status: Full Code    Disposition: Expected discharge tolerating a diet.  Ruling out C. difficile    James Wolf    Interval History   Pain or nausea or vomiting.  Patient's appetite has diminished a bit and feels a little more tired and fatigued.  She's had more than 3 loose/liquid stools in the last 24 hours.  No fevers sweats or chills.  No blood in the stools.  No lightheadedness or dizziness.  She does state that she feels like she is retaining a lot of fluid and hasn't really voided or urinated yet.      -Data reviewed today: I reviewed all new labs and imaging results over the last 24 hours. I personally reviewed no images or EKG's today.    Physical Exam   Temp: 99.3  F (37.4  C) Temp src: Oral BP: 105/70 Pulse: 93 Heart Rate: 94 Resp: 16 SpO2: 97 % O2 Device: None (Room air)    Vitals:    05/14/18 0624 05/15/18 0646 05/16/18 0700   Weight: 74.1 kg (163 lb 6.4 oz) 75.6 kg (166 lb 11.2 oz) 75.4 kg (166 lb 3.2 oz)     Vital Signs with Ranges  Temp:  [98.2  F (36.8  C)-99.3  F (37.4  C)] 99.3  F (37.4  C)  Pulse:  [93]  93  Heart Rate:  [77-94] 94  Resp:  [16-18] 16  BP: ()/(60-84) 105/70  SpO2:  [96 %-97 %] 97 %  I/O last 3 completed shifts:  In: 56.1 [I.V.:56.1]  Out: 1300 [Urine:1300]    GENERAL:  Comfortable. Cooperative.nad   PSYCH: pleasant, oriented, No acute distress.  EYES: PERRLA, Normal conjunctiva.  HEART:  Regular rate and rhythm. No JVD. Pulses normal. No edema.  LUNGS:  Clear to auscultation, normal Respiratory effort.  ABDOMEN:  Soft, no hepatosplenomegaly, normal bowel sounds.  EXTREMETIES: No clubbing, cyanosis or ischemia, mild lower extremity edema  SKIN:  Dry to touch, No rash.       Medications       amoxicillin-clavulanate  1 tablet Oral Q12H CAROLINE     lisinopril  10 mg Oral Daily     ursodiol  600 mg Oral BID       Data     Recent Labs  Lab 05/16/18  0650 05/15/18  1620 05/15/18  0817  05/14/18  0653  05/13/18  0935   WBC 10.2  --  16.0*  --  21.1*  --  22.2*   HGB  --   --  12.5  --  11.3*  --  12.6   MCV  --   --  87  --  86  --  84   PLT  --   --  375  --  303  --  387   INR  --   --   --   --   --   --  1.22*     --  134  --  135  --  131*   POTASSIUM 3.8 3.6 3.3*  < > 3.1*  < > 2.1*   CHLORIDE 109  --  103  --  102  --  95   CO2 19*  --  20  --  23  --  25   BUN 7  --  11  --  10  --  13   CR 0.60  --  0.61  --  0.83  --  0.62   ANIONGAP 10  --  11  --  10  --  11   MARIELENA 8.4*  --  8.7  --  8.0*  --  8.5   GLC 73  --  68*  --  80  --  121*   ALBUMIN 1.6*  --  1.9*  --  1.7*  --  2.1*   PROTTOTAL 6.8  --  7.9  --  6.9  --  8.3   BILITOTAL 2.0*  --  3.0*  --  4.2*  --  3.4*   ALKPHOS 344*  --  417*  --  374*  --  441*   ALT 71*  --  87*  --  80*  --  83*   AST 92*  --  115*  --  134*  --  119*   LIPASE  --   --   --   --   --   --  142   < > = values in this interval not displayed.    No results found for this or any previous visit (from the past 24 hour(s)).

## 2018-05-17 VITALS
HEIGHT: 65 IN | DIASTOLIC BLOOD PRESSURE: 55 MMHG | BODY MASS INDEX: 27.39 KG/M2 | HEART RATE: 98 BPM | OXYGEN SATURATION: 97 % | SYSTOLIC BLOOD PRESSURE: 95 MMHG | WEIGHT: 164.4 LBS | RESPIRATION RATE: 17 BRPM | TEMPERATURE: 98 F

## 2018-05-17 LAB — POTASSIUM SERPL-SCNC: 3.6 MMOL/L (ref 3.4–5.3)

## 2018-05-17 PROCEDURE — 25000132 ZZH RX MED GY IP 250 OP 250 PS 637: Performed by: INTERNAL MEDICINE

## 2018-05-17 PROCEDURE — 84132 ASSAY OF SERUM POTASSIUM: CPT | Performed by: INTERNAL MEDICINE

## 2018-05-17 PROCEDURE — 99238 HOSP IP/OBS DSCHRG MGMT 30/<: CPT | Performed by: INTERNAL MEDICINE

## 2018-05-17 PROCEDURE — 36415 COLL VENOUS BLD VENIPUNCTURE: CPT | Performed by: INTERNAL MEDICINE

## 2018-05-17 RX ADMIN — POTASSIUM CHLORIDE 20 MEQ: 1500 TABLET, EXTENDED RELEASE ORAL at 08:36

## 2018-05-17 RX ADMIN — AMOXICILLIN AND CLAVULANATE POTASSIUM 1 TABLET: 875; 125 TABLET, FILM COATED ORAL at 08:36

## 2018-05-17 RX ADMIN — URSODIOL 600 MG: 300 CAPSULE ORAL at 08:36

## 2018-05-17 ASSESSMENT — ACTIVITIES OF DAILY LIVING (ADL)
ADLS_ACUITY_SCORE: 9

## 2018-05-17 NOTE — PLAN OF CARE
Problem: Patient Care Overview  Goal: Plan of Care/Patient Progress Review  Outcome: Improving   05/14/18 2022 05/14/18 2120   OTHER   Plan Of Care Reviewed With --  patient   Plan of Care Review   Progress no change --      Tele: SR, HR: 76-84. Physical assessment WDL w/exceptions as noted on Adult PCS flowsheet. Refer to VS, I/O, Adult PCS for full assessment & shift details. Disposition anticipated for later today.  Marlene Grover, PEBBLESN, RN  Medical/Telemetry - 3         Problem: Pain, Acute (Adult)  Goal: Identify Related Risk Factors and Signs and Symptoms  Related risk factors and signs and symptoms are identified upon initiation of Human Response Clinical Practice Guideline (CPG).   Outcome: Adequate for Discharge Date Met: 05/17/18 05/16/18 2100   Pain, Acute   Related Risk Factors (Acute Pain) infection   Signs and Symptoms (Acute Pain) constipation/diarrhea

## 2018-05-17 NOTE — PROGRESS NOTES
"GASTROENTEROLOGY CHART UPDATE (patient not seen)        OBJECTIVE:    BP 95/55 (BP Location: Left arm)  Pulse 98  Temp 98  F (36.7  C) (Oral)  Resp 17  Ht 1.651 m (5' 5\")  Wt 74.6 kg (164 lb 6.4 oz)  SpO2 97%  BMI 27.36 kg/m2  Temp (24hrs), Av.9  F (36.6  C), Min:96.7  F (35.9  C), Max:98.5  F (36.9  C)    Patient Vitals for the past 72 hrs:   Weight   18 0636 74.6 kg (164 lb 6.4 oz)   18 0700 75.4 kg (166 lb 3.2 oz)   05/15/18 0646 75.6 kg (166 lb 11.2 oz)       Intake/Output Summary (Last 24 hours) at 18 0905  Last data filed at 18 0600   Gross per 24 hour   Intake              360 ml   Output                0 ml   Net              360 ml         Additional Comments:  ROS, FH, SH: See initial GI consult for details.     I have reviewed the patient's new clinical lab results:     Recent Labs   Lab Test  18   0650  05/15/18   0817  18   0653  18   0935   WBC  10.2  16.0*  21.1*  22.2*   HGB   --   12.5  11.3*  12.6   MCV   --   87  86  84   PLT   --   375  303  387   INR   --    --    --   1.22*     Recent Labs   Lab Test  18   0703  18   0650  05/15/18   1620  05/15/18   0817   18   0653   POTASSIUM  3.6  3.8  3.6  3.3*   < >  3.1*   CHLORIDE   --   109   --   103   --   102   CO2   --   19*   --   20   --   23   BUN   --   7   --   11   --   10   ANIONGAP   --   10   --   11   --   10    < > = values in this interval not displayed.     Recent Labs   Lab Test  18   0650  05/15/18   0817  18   0653  18   0935   ALBUMIN  1.6*  1.9*  1.7*  2.1*   BILITOTAL  2.0*  3.0*  4.2*  3.4*   ALT  71*  87*  80*  83*   AST  92*  115*  134*  119*   LIPASE   --    --    --   142     IMAGING    2018 [facility] Liver W/WO IV contrast ( [facility])  IMPRESSION:    1. No suspicious enhancing hepatic lesions.  2. Cirrhotic appearing liver. The inhomogeneous signal and enhancement is most likely due to confluent areas of fibrosis, and " perfusional.  3. Small hepatic cyst.  4. Dilated and irregular appearing bile ducts. Bile ducts appear more dilated on the current study, although in part this could be due to differences in technique. The enhancement along the bile ducts could be due to inflammation and/or periportal fibrosis.      ASSESSMENT/ PLAN    Berta Nava is a 53 yo female with primary biliary cholangitis and cirrhosis who presented with abdominal pain found to have elevated LFTs.    1. Suspected acute cholangitis / Primary biliary cholangitis with cirrhosis : Dx in 2012 on liver biopsy with evidence of fibrosis. Recent imaging with cirrhosis but no liver lesions. June 2017 EGD without portal HTN or esophageal varices. Maintained on Ursodiol 600 mg BID.     -- Significant clinical improvement in abdominal pain with conservative management. WBC normal. Afebrile. Zosyn transitioned to Augmentin . Suggest 10 days of antibiotics on discharge.   --  Total bilirubin improving at 2.  -- Will need outpatient clinic visit with primary hepatologist Dr. Kaufman.     2. Microscopic colitis : Dx in 2008 on biopsy during colonoscopy. No history of IBD.     3. History of pancreatic cyst : followed by endoscopic ultrasound with resolution per Dr. Kaufman hepatology.       Discussed with Dr. Siva Jama, PA-C  [facility]

## 2018-05-17 NOTE — DISCHARGE SUMMARY
[facility]    Discharge Summary  Hospitalist    Date of Admission:  5/13/2018  Date of Discharge:  5/17/2018  Discharging Provider: James Wolf MD  Date of Service (when I saw the patient): 05/17/18    Discharge Diagnoses   Acute cholangitis in setting of biliary cirrhosis.  Hypokalemia, severe      History of Present Illness and Hospital Course  Berta Nava is a 52-year-old female with history of hypertension, hypokalemia, biliary cirrhosis, and ascites.  She presented to the emergency department for evaluation of worsening abdominal pain, nausea, and vomiting.  She has been having some abdominal pain off and on for the last 2 or 3 weeks.  This pain was not severe and seemed to wax and wane.  She developed some cough in the last several days for which she was evaluated in urgent care.  She was prescribed Zithromax for possible pneumonia.  She did not have abdominal pain at that time.  The abdominal pain worsened this morning.  She had associated nausea and vomiting.  She went to urgent care for evaluation and was referred to the emergency department.  Emergency department evaluation showed stable vital signs.  Of note, she had temperature of 100.4  at home prior to presentation.  Laboratory evaluation showed leukocytosis with white blood cell count 22.2.  The remainder of the CBC was unremarkable.  Potassium was 2.1 and the remainder of the basic metabolic panel was unremarkable.  Liver function tests were abnormal with total bilirubin of 3.4 (this is usually normal).  Alkaline phosphatase was 441 which is similar to Berta's baseline.  ALT was 83 and AST was 119 which also seem to be similar to Berta's baseline.  Abdominal ultrasound was obtained and showed dilated gallbladder and common bile duct.  No gallstones or gallbladder wall thickening was noted.  GI was consulted and recommended ongoing antibiotics, Zosyn and pain control.  Patient did well with improvement in liver function  enzymes and white count. She has now advanced to a low fiber diet and is tolerating without pain or nausea.  She did experience some severe hypokalemia and this was felt to be due to her Dyazide.  Her potassium was corrected prior to discharge and her Dyazide was discontinued.  Her blood pressures were soft, 90s-110, and her lisinopril was also stopped.  She did have some edema due to IV fluids and received a one time dose of Lasix with good results.     She will follow up with per PCP next week and recheck potassium and blood pressure.  Medications can be readdressed at that time and she will check her blood pressure with her home blood pressure cuff.      If she has increasing pain or fever, nausea, vomiting she should return and an MRCP would be ordered.  Otherwise she will follow up with her primary GI physician.    James Wolf MD    Significant Results and Procedures   See below    Pending Results   These results will be followed up by [facility], [place]   Unresulted Labs Ordered in the Past 30 Days of this Admission     Date and Time Order Name Status Description    5/13/2018 1107 Blood culture Preliminary     5/13/2018 1107 Blood culture Preliminary           Code Status   Full Code       Primary Care Physician   [facility] [facility]    Physical Exam   Temp: 98  F (36.7  C) Temp src: Oral BP: 95/55 Pulse: 98 Heart Rate: 75 Resp: 17 SpO2: 97 % O2 Device: None (Room air)    Vitals:    05/15/18 0646 05/16/18 0700 05/17/18 0636   Weight: 75.6 kg (166 lb 11.2 oz) 75.4 kg (166 lb 3.2 oz) 74.6 kg (164 lb 6.4 oz)     Vital Signs with Ranges  Temp:  [98  F (36.7  C)-99.3  F (37.4  C)] 98  F (36.7  C)  Pulse:  [93-98] 98  Heart Rate:  [75-94] 75  Resp:  [16-17] 17  BP: ()/(55-84) 95/55  SpO2:  [96 %-97 %] 97 %  I/O last 3 completed shifts:  In: 500 [P.O.:500]  Out: 1800 [Urine:1800]    Constitutional: Awake, alert, cooperative, no apparent distress.  Eyes: Conjunctiva and pupils examined and  normal.  HEENT: Moist mucous membranes, normal dentition.  Respiratory: Clear to auscultation bilaterally, no crackles or wheezing.  Cardiovascular: Regular rate and rhythm, normal S1 and S2, and no murmur noted.  GI: Soft, non-distended, non-tender, normal bowel sounds.        Discharge Disposition   Discharged to home  Condition at discharge: Stable    Consultations This Hospital Stay   GASTROENTEROLOGY IP CONSULT    Time Spent on this Encounter   IJames, personally saw the patient today and spent less than or equal to 30 minutes discharging this patient.    Discharge Orders     Reason for your hospital stay   cholangitis     Follow-up and recommended labs and tests    Follow up with primary care provider, [place] [facility], within 7 days to evaluate medication change and for hospital follow- up.  The following labs/tests are recommended: potassium and blood pressure check.  Stopped lisinopril and Dyazide due to soft blood pressure and hypokalemia.    Follow up with primary GI doctor.     Activity   Your activity upon discharge: activity as tolerated     When to contact your care team   Increase in pain, nausea,vomiting or fever 101 or greater.     Full Code     Diet   Follow this diet upon discharge: Orders Placed This Encounter     Low Fiber Diet       Discharge Medications   Current Discharge Medication List      START taking these medications    Details   amoxicillin-clavulanate (AUGMENTIN) 875-125 MG per tablet Take 1 tablet by mouth every 12 hours for 7 days  Qty: 14 tablet, Refills: 0    Associated Diagnoses: Cholangitis         CONTINUE these medications which have NOT CHANGED    Details   Calcium carb-Vitamin D 500 mg Soboba-200 units (OSCAL WITH D;OYSTER SHELL CALCIUM) 500-200 MG-UNIT per tablet Take 1 tablet by mouth daily      guaiFENesin-codeine (ROBITUSSIN AC) 100-10 MG/5ML SOLN Take 1 tsp. by mouth nightly as needed for cough      multivitamin, therapeutic (THERA-VIT) TABS tablet  Take 1 tablet by mouth daily      simvastatin (ZOCOR) 20 MG tablet Take 20 mg by mouth At Bedtime      ursodiol (ACTIGALL) 300 MG capsule Take 600 mg by mouth 2 times daily         STOP taking these medications       azithromycin (ZITHROMAX) 250 MG tablet Comments:   Reason for Stopping:         hydrochlorothiazide (HYDRODIURIL) 25 MG tablet Comments:   Reason for Stopping:         lisinopril (PRINIVIL/ZESTRIL) 10 MG tablet Comments:   Reason for Stopping:             Allergies   Allergies   Allergen Reactions     Contrast Dye      Data   Most Recent 3 CBC's:  Recent Labs   Lab Test  05/16/18   0650  05/15/18   0817  05/14/18   0653  05/13/18   0935   WBC  10.2  16.0*  21.1*  22.2*   HGB   --   12.5  11.3*  12.6   MCV   --   87  86  84   PLT   --   375  303  387      Most Recent 3 BMP's:  Recent Labs   Lab Test  05/17/18   0703  05/16/18   0650  05/15/18   1620  05/15/18   0817   05/14/18   0653   NA   --   138   --   134   --   135   POTASSIUM  3.6  3.8  3.6  3.3*   < >  3.1*   CHLORIDE   --   109   --   103   --   102   CO2   --   19*   --   20   --   23   BUN   --   7   --   11   --   10   CR   --   0.60   --   0.61   --   0.83   ANIONGAP   --   10   --   11   --   10   MARIELENA   --   8.4*   --   8.7   --   8.0*   GLC   --   73   --   68*   --   80    < > = values in this interval not displayed.     Most Recent 2 LFT's:  Recent Labs   Lab Test  05/16/18   0650  05/15/18   0817   AST  92*  115*   ALT  71*  87*   ALKPHOS  344*  417*   BILITOTAL  2.0*  3.0*     Most Recent INR's and Anticoagulation Dosing History:  Anticoagulation Dose History     Recent Dosing and Labs Latest Ref Rng & Units 5/13/2018    INR 0.86 - 1.14 1.22(H)        Most Recent 3 Troponin's:No lab results found.  Most Recent Cholesterol Panel:No lab results found.  Most Recent 6 Bacteria Isolates From Any Culture (See EPIC Reports for Culture Details):  Recent Labs   Lab Test  05/13/18   1145  05/13/18   1143   CULT  No growth after 4 days  No growth  after 4 days     Most Recent TSH, T4 and A1c Labs:No lab results found.  Results for orders placed or performed during the hospital encounter of 05/13/18   US Abdomen Limited    Narrative    RIGHT UPPER QUADRANT ULTRASOUND 5/13/2018 10:05 AM    HISTORY:  RUQ pain;     COMPARISON: None.    FINDINGS:    Gallbladder: The gallbladder is distended. No gallstones are  identified. Wall of the gallbladder appears normal. The patient is not  focally tender over the gallbladder.    Bile ducts:  Common duct is dilated at 1 cm. The cause of the ductal  dilatation is not identified on this study.    Liver:  The liver is diffusely heterogeneous.    Pancreas:  Partially obscured by gas but grossly normal.    Right kidney:   Normal.       Impression    IMPRESSION:    1. Dilated gallbladder and dilated common duct. The cause of the  ductal dilatation is not identified. If clinically indicated, an ERCP  or MRCP could be performed for further evaluation of the biliary  ducts.  2. No gallstones are identified. The gallbladder wall was not  thickened.  3. Diffusely heterogeneous liver.     MARBIN TAO MD

## 2018-05-17 NOTE — PLAN OF CARE
Problem: Patient Care Overview  Goal: Plan of Care/Patient Progress Review  Outcome: Adequate for Discharge Date Met: 05/17/18  Discharge teaching done, question answered. IV removed-CDI. Left via private car

## 2018-05-19 ENCOUNTER — HEALTH MAINTENANCE LETTER (OUTPATIENT)
Age: 53
End: 2018-05-19

## 2018-05-19 LAB
BACTERIA SPEC CULT: NO GROWTH
BACTERIA SPEC CULT: NO GROWTH
Lab: NORMAL
Lab: NORMAL
SPECIMEN SOURCE: NORMAL
SPECIMEN SOURCE: NORMAL

## 2019-09-29 ENCOUNTER — HEALTH MAINTENANCE LETTER (OUTPATIENT)
Age: 54
End: 2019-09-29

## 2020-08-13 ENCOUNTER — TRANSFERRED RECORDS (OUTPATIENT)
Dept: MULTI SPECIALTY CLINIC | Facility: CLINIC | Age: 55
End: 2020-08-13

## 2020-08-13 LAB
CHOLESTEROL (EXTERNAL): 184 MG/DL (ref 0–199)
HDLC SERPL-MCNC: 37 MG/DL
HPV ABSTRACT: NORMAL
LDL CHOLESTEROL (EXTERNAL): 129 MG/DL
NON HDL CHOLESTEROL (EXTERNAL): 147 MG/DL
PAP-ABSTRACT: NORMAL
TRIGLYCERIDES (EXTERNAL): 89 MG/DL

## 2020-11-20 ENCOUNTER — TRANSFERRED RECORDS (OUTPATIENT)
Dept: HEALTH INFORMATION MANAGEMENT | Facility: CLINIC | Age: 55
End: 2020-11-20

## 2021-01-14 ENCOUNTER — HEALTH MAINTENANCE LETTER (OUTPATIENT)
Age: 56
End: 2021-01-14

## 2021-01-26 ENCOUNTER — TRANSCRIBE ORDERS (OUTPATIENT)
Dept: OTHER | Age: 56
End: 2021-01-26

## 2021-01-26 DIAGNOSIS — K83.01 PSC (PRIMARY SCLEROSING CHOLANGITIS) (H): Primary | ICD-10-CM

## 2021-01-28 ENCOUNTER — TELEPHONE (OUTPATIENT)
Dept: GASTROENTEROLOGY | Facility: CLINIC | Age: 56
End: 2021-01-28

## 2021-01-28 DIAGNOSIS — K83.01 PSC (PRIMARY SCLEROSING CHOLANGITIS) (H): Primary | ICD-10-CM

## 2021-01-28 NOTE — TELEPHONE ENCOUNTER
[facility]     Referring provider:  Gonzalo Tapia    Referred to: [facility]     Provider Requested:  NA     Referral Received: 1/28/2021     Records received: 1/28/2021     Images received: ERCP fluro and US in [facility]    Evaluation for: possible ERCP, consider consult first. Dx: PSC, liver abscess, cirrhosis     Clinical History (per RN review):     Impression and Recommendations: 55 y.o. seen today in follow-up after her ERCP for PSC with intrahepatic stones, probable intrahepatic abscess verses just a very dilated biliary tree on the left side. We spent quite a bit of time sweeping her duct on the left with copious production of stones and pus material. Thankfully she is doing well clinically, but I am concerned that this might not be manageable by endoscopic therapy alone. I will have her get an MRCP/MRI of her liver to further image this area and will plan to have her meet the liver surgeons over at the [facility] as I suspect she may need at the least percutaneous drainage if not something more definitive given her stones hiding above the stricture. I will also have her see GI at the [facility]. Given her baseline cirrhosis and ascites, even a percutaneous drain might be a complicated process for her.    ERCP 1-6-21  Findings:       A biliary stent was visible on the  film. The        esophagus was successfully intubated under direct        vision without detailed examination of the pharynx,        larynx, and associated structures, and upper GI        tract. The upper GI tract was grossly normal. One        biliary stent originating in the left hepatic duct        was emerging from the major papilla. The stent was        partially occluded. Pus was noted coming from the        stent. One stent was removed from the biliary tree        using a snare. The bile duct was deeply cannulated        with the 12 mm balloon. Contrast was injected. I        personally  interpreted the bile duct images. Ductal        flow of contrast was adequate. The left intrahepatic        branches contained multiple stones. A 0.025 inch        straight standard wire passed successfully into the        left intrahepatic branches. The biliary tree was        swept with a 12 mm balloon starting at the left        intrahepatic duct(s). Many stones were removed. Many        stones remained. Pus was swept from the duct. One 8.5        Fr by 18 cm transpapillary biliary stent with a        single external flap and a single internal flap was        placed 17.5 cm into the left hepatic duct. Bile        flowed through the stent. The stent was in good        position. Minimal contrast was injected to reduce the        risk of infecting other areas in setting of PSC and        multiple strictures.  Impression:          - One partially occluded stent from                        the left hepatic duct was seen in the                        major papilla. Removed.                       - Choledocholithiasis was found in                        the left intrahepatic duct with many                        stones and extensive pus. Partial                        removal was accomplished with balloon                        extraction; a stent was inserted.  Recommendation:      - Perform a RUQ ultrasound at                        appointment to be scheduled. Will                        likely follow with CT vs MRI to                        assess for abscess.    MD review date: 1/28/2021  MD Decision for clinic consultation/Orders:            Referral updates/Patient contacted:

## 2021-01-28 NOTE — TELEPHONE ENCOUNTER
[facility] Intake form:    Referring/Requesting Provider: Referred by Gonzalo Tapia    Referral Received via: [facility]: [facility]    Phone Number: Phone: [phone]     Fax Number: Fax: [phone]    Address: [address] [place] [address]    Requested provider (if specified): No Preference    Urgency: No      Indication/Diagnosis for consultation: possible ERCP, consider consult first. Dx: PSC, liver abscess, cirrhosis    Has patient been evaluated by another Gastroenterologist? If Yes, what Provider and Location: yes, referring provider      [facility] - Surgery Clinic  [facility]   Surgery Clinic: [facility]  Attn:  Francisco Lott or Fredrick  4th Floor, Mail code [id number]  [address] [place] [address]    Is patient aware of request for clinc consultation and ok to be contacted to schedule? Yes - if pt is not aware of request, inform referring office to inform patient that they will be contacted to schedule once request has been reviewed.     Inform referring clinic of the following and provided fax number to: [facility] - [phone]    READ TO REFERRING CLINIC:  Due to high demands for our services our clinic requires all records including imaging studies be mailed and faxed to our facility prior to scheduling. Records should be faxed within 24-72 hours of referral if possible and images should be pushed to our PACS system or received by mail within 72 hours of referral. Our office will NOT call to follow up or request records.  Our clinic will not be able to process, schedule or contact patient without records and Images for MD review process. Once records have been reviewed and recommendation/orders have been made the patient will be contacted to schedule. If records have not been received within 2 weeks of initial referral call, referring office will be notified by letter and referral  will be closed. If an appointment is unable to be offered at this time we will inform the referring office and patient via letter.

## 2021-01-29 NOTE — TELEPHONE ENCOUNTER
Per Dr. Alcala    Will review the MRI/MRCP from Dr Tapia before scheduling ERCP with possible spyglass cholangioscopy    Called to discuss with patient, and to see when imaging scheduled. MRI scheduled 2/4, will get copy and forward as able.     Isabel Ramos, RN Care Coordinator

## 2021-02-05 ENCOUNTER — CARE COORDINATION (OUTPATIENT)
Dept: GASTROENTEROLOGY | Facility: CLINIC | Age: 56
End: 2021-02-05

## 2021-02-09 ENCOUNTER — PATIENT OUTREACH (OUTPATIENT)
Dept: SURGERY | Facility: CLINIC | Age: 56
End: 2021-02-09

## 2021-02-09 DIAGNOSIS — K83.09 CHOLANGITIS (H): Primary | ICD-10-CM

## 2021-02-09 NOTE — TELEPHONE ENCOUNTER
Per Dr. Alcala he recommends review by transplant surgery prior to him reviewing for endoscopic therapy. Tried to call [facility] office to discuss, on hold for a long amount of time without reaching anyone. Called pt to discuss recommendations from Dr. Alcala, she understands referral to be reviewed by transplant surgery.    Orders placed for transplant surgery to review.     ML

## 2021-02-09 NOTE — PROGRESS NOTES
A referral was placed for this patient to consult with the General Surgery Team.  Dr. Rogers does not meet patients out of this clinic at the [facility].  The referral was cancelled and a new one was placed with Transplant on behalf of the referring provider. In-basket message sent.    Comments   Refer to transplant surgery, Dr Treviño, r/t referral from Dr Caleb Tapia  Other (Use Comments)  Please call the facility your provider referred you to schedule your appointment

## 2021-02-10 ENCOUNTER — MEDICAL CORRESPONDENCE (OUTPATIENT)
Dept: HEALTH INFORMATION MANAGEMENT | Facility: CLINIC | Age: 56
End: 2021-02-10

## 2021-02-12 ENCOUNTER — MEDICAL CORRESPONDENCE (OUTPATIENT)
Dept: HEALTH INFORMATION MANAGEMENT | Facility: CLINIC | Age: 56
End: 2021-02-12

## 2021-02-15 ENCOUNTER — REFERRAL (OUTPATIENT)
Dept: TRANSPLANT | Facility: CLINIC | Age: 56
End: 2021-02-15

## 2021-02-15 DIAGNOSIS — R18.8 CIRRHOSIS OF LIVER WITH ASCITES (H): ICD-10-CM

## 2021-02-15 DIAGNOSIS — K76.6 PORTAL HYPERTENSION (H): ICD-10-CM

## 2021-02-15 DIAGNOSIS — K74.60 CIRRHOSIS OF LIVER WITH ASCITES, UNSPECIFIED HEPATIC CIRRHOSIS TYPE (H): ICD-10-CM

## 2021-02-15 DIAGNOSIS — K83.01 PSC (PRIMARY SCLEROSING CHOLANGITIS) (H): Primary | ICD-10-CM

## 2021-02-15 DIAGNOSIS — K74.60 CIRRHOSIS OF LIVER WITH ASCITES (H): ICD-10-CM

## 2021-02-15 DIAGNOSIS — K65.2 SBP (SPONTANEOUS BACTERIAL PERITONITIS) (H): ICD-10-CM

## 2021-02-15 DIAGNOSIS — R18.8 CIRRHOSIS OF LIVER WITH ASCITES, UNSPECIFIED HEPATIC CIRRHOSIS TYPE (H): ICD-10-CM

## 2021-02-15 NOTE — LETTER
3/3/2021    Berta Nava  [address] [facility] [address]      Dear Berta,    Thank you for your interest in the Transplant Center at [facility]. We look forward to being a part of your care team and assisting you through the transplant process.    As we discussed, your transplant coordinator is Wilmer Lazar Jr. (Liver).  You may call your coordinator at any time with questions or concerns call [phone].    Please complete the following.    1. Fill out and return the enclosed forms    Authorization for Electronic Communication    Authorization to Discuss Protected Health Information    Authorization for Release of Protected Health Information    Authorization for Care Everywhere Release of Information    2. Sign up for:    [employer], access to your electronic medical record (see enclosed pamphlet)    [email or web address] [facility] [employer], a transplant education website    You can use these tools to learn more about your transplant, communicate with your care team, and track your medical details      Sincerely,  Solid Organ Transplant  [facility]    cc: Referring Physician and PCP

## 2021-02-22 VITALS — BODY MASS INDEX: 26.66 KG/M2 | HEIGHT: 65 IN | WEIGHT: 160 LBS

## 2021-02-22 PROBLEM — R87.615 UNSATISFACTORY CERVICAL PAPANICOLAOU SMEAR: Status: ACTIVE | Noted: 2019-05-04

## 2021-02-22 PROBLEM — R53.82 CHRONIC FATIGUE: Status: ACTIVE | Noted: 2017-12-11

## 2021-02-22 PROBLEM — M35.00 SICCA SYNDROME (H): Status: ACTIVE | Noted: 2017-12-11

## 2021-02-22 PROBLEM — K51.019 ULCERATIVE PANCOLITIS WITH COMPLICATION (H): Status: ACTIVE | Noted: 2019-06-12

## 2021-02-22 PROBLEM — K83.01 PSC (PRIMARY SCLEROSING CHOLANGITIS) (H): Status: ACTIVE | Noted: 2019-06-12

## 2021-02-22 SDOH — HEALTH STABILITY: MENTAL HEALTH: HOW OFTEN DO YOU HAVE 6 OR MORE DRINKS ON ONE OCCASION?: NOT ASKED

## 2021-02-22 SDOH — HEALTH STABILITY: MENTAL HEALTH: HOW MANY STANDARD DRINKS CONTAINING ALCOHOL DO YOU HAVE ON A TYPICAL DAY?: NOT ASKED

## 2021-02-22 SDOH — HEALTH STABILITY: MENTAL HEALTH: HOW OFTEN DO YOU HAVE A DRINK CONTAINING ALCOHOL?: NOT ASKED

## 2021-02-22 ASSESSMENT — MIFFLIN-ST. JEOR: SCORE: 1311.64

## 2021-02-22 NOTE — TELEPHONE ENCOUNTER
Patient was asked the following questions during liver intake call.     Referring Provider: Dr. Venus London   Referring Diagnosis: PSC  PCP: Dr. Bernarda Cervantes     1)Do you know why you have liver disease: Yes              If Alcoholic Cirrhosis is present when was your last drink: 4 years Ago              Have you ever been through treatment for alcohol: No  2) Presence of Ascites: Yes Paracentesis: No  3) Presence of Hepatic Encephalopathy: Yes Medications: Yes  4) History of GI Bleeding: Yes  5) Oxygen Use: None  6) EGD: Yes Where: [facility] When: 2020  7) Colonoscopy: Yes Where: [facility] When: 2020  8) MELD Score: 8  9) Insurance information: [facility]      Policy brunson: Self       Subscriber/policy/ID number: [id number]      Group Number: [id number]    Referral intake process completed.  Patient is aware that after financial approval is received, medical records will be requested.   Patient confirmed for a callback from transplant coordinator on 3/3/21.  Tentative evaluation date TBD.    Confirmed coordinator will discuss evaluation process in more detail at the time of their call.   Patient is aware of the need to arrange age appropriate cancer screening, vaccinations, and dental care.  Reminded patient to complete questionnaire, complete medical records release, and review packet prior to evaluation visit .  Assessed patient for special needs (ie--wheelchair, assistance, guardian, and ):  None  Patient instructed to call [phone] with questions.     Patient gave verbal consent during intake call to obtain medical records and documents outside of [facility]:  Yes    PEBBLES Back, LPN   Solid Organ Transplant

## 2021-03-03 ENCOUNTER — DOCUMENTATION ONLY (OUTPATIENT)
Dept: TRANSPLANT | Facility: CLINIC | Age: 56
End: 2021-03-03

## 2021-03-03 PROBLEM — R18.8 CIRRHOSIS OF LIVER WITH ASCITES (H): Status: ACTIVE | Noted: 2021-03-03

## 2021-03-03 PROBLEM — K74.60 CIRRHOSIS OF LIVER WITH ASCITES (H): Status: ACTIVE | Noted: 2021-03-03

## 2021-03-03 NOTE — TELEPHONE ENCOUNTER
"Referral from Dr. 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   Liver numbers always been high since  DX with PBC (yes, PBC) 10 yrs ago  Taking shonna since until 2019 when she had GIB & scope showed UC was back  Admit 5/13/18 - changed dx from PBC to PSC  Been doing well since then    ruben'd for MRI last March, covid happened, and then had MRI in Sept 2020.     See 12/10/20 pulmonary appt for sarcoidosis     Father received a heart txp at [facility] years ago for unknown reason to patient. She knows history, but not exact reason why.     3/23/21 with Tuesday team to determine if full txp eval is needed now.      "

## 2021-03-05 NOTE — TELEPHONE ENCOUNTER
RECORDS RECEIVED FROM: Internal   Appt Date: 03.23.2021    NOTES STATUS DETAILS   OFFICE NOTE from referring provider Internal 02.15.2021 Leventhal, Thomas Michael, MD    OFFICE NOTES from other specialists Care Everywhere 01.25.2021 Gonzalo Tapia MD      11.20.2020 Bernarda Cervantes MD      10.13.2020 Venus London MD      02.26.2020 Sebastian Kaufman MD     DISCHARGE SUMMARY from hospital N/A    MEDICATION LIST Care Everywhere    LIVER BIOSPY (IF APPLICABLE)      PATHOLOGY REPORTS  N/A    IMAGING     ENDOSCOPY (IF AVAILABLE) Care Everywhere 12.23.2020 12.01.2020   COLONOSCOPY (IF AVAILABLE) N/A    ULTRASOUND LIVER Internal    Care Everywhere 05.13.2018 US Abd Limited        01.21.2021 US ABD RUQ ORGANS   CT OF ABDOMEN N/A    MRI OF LIVER Care Everywhere 02.04.2021, 09.16.2020, [id number],  MR ABD W/WO IV CONT    01.22.2018 MR Liver W/WO   FIBROSCAN, US ELASTOGRAPHY, FIBROSIS SCAN, MR ELASTOGRAPHY N/A    LABS     HEPATIC PANEL (LIVER PANEL) Care Everywhere 02.04.2021   BASIC METABOLIC PANEL Future 03.03.2021   COMPLETE METABOLIC PANEL Care Everywhere 02.24.2021   COMPLETE BLOOD COUNT (CBC) Care Everywhere 02.24.2021   INTERNATIONAL NORMALIZED RATIO (INR) Care Everywhere 02.04.2021   HEPATITIS C ANTIBODY N/A    HEPATITIS C VIRAL LOAD/PCR N/A    HEPATITIS C GENOTYPE N/A    HEPATITIS B SURFACE ANTIGEN N/A    HEPATITIS B SURFACE ANTIBODY N/A    HEPATITIS B DNA QUANT LEVEL N/A    HEPATITIS B CORE ANTIBODY N/A

## 2021-03-16 ENCOUNTER — DOCUMENTATION ONLY (OUTPATIENT)
Dept: TRANSPLANT | Facility: CLINIC | Age: 56
End: 2021-03-16

## 2021-03-18 ENCOUNTER — IMMUNIZATION (OUTPATIENT)
Dept: NURSING | Facility: CLINIC | Age: 56
End: 2021-03-18
Payer: COMMERCIAL

## 2021-03-18 PROCEDURE — 91301 PR COVID VAC MODERNA 100 MCG/0.5 ML IM: CPT

## 2021-03-18 PROCEDURE — 0011A PR COVID VAC MODERNA 100 MCG/0.5 ML IM: CPT

## 2021-03-22 NOTE — PROGRESS NOTES
[facility]  Outpatient MNT: Liver Transplant Evaluation    Current BMI: 26.6 (HT 65 in,  lbs/73 kg)- data from Feb 2021  BMI is within recommendation of <40 for liver transplant    Frailty Assessment -- Not Frail (0/5)     Time Spent: 15 minutes  Visit Type: Initial   Referring Physician: Oskar   Pt accompanied by: self     History of previous txp: none     Nutrition Assessment  Low Na, going well   Meat not appealing lately     Appetite: reduced d/t bloating the last few weeks    Vitamins, Supplements, Pertinent Meds: MVI, calcium with vit D  Herbal Medicines/Supplements: none   Protein supplement: none     Edema: none     Weight hx:   -- since Oct/diuretic lost 18 lbs, overall stable since then; prior -160 lbs  -- no muscle wasting noted    Food Security: any concerns about having enough money to buy food or access to grocery stores? No     Diet Recall  Breakfast Oatmeal with frozen berries; english muffin with PB and banana    Lunch Snacks on apple/cheese, [employer] yogurt, crackers   Dinner Can of soup, english muffin with PB, white or brown rice    Snacks Some crackers, likes pickles    Beverages Coffee, water, occasional soda    Dining out 1x/month      Physical Activity  Walks around nearby pond 2 miles, weather dependent      Nutrition Diagnosis  No nutrition diagnosis identified at this time     Nutrition Intervention  Nutrition education provided:  Discussed sodium intake (low sodium foods and drinks, seasoning food without salt and tips for low sodium diet).    Reviewed adequate protein intake. Encouraged receiving protein from both animal and plant based sources.     Reviewed post txp diet guidelines in brief (will review in further detail post txp):  (1) Review of proper food safety measures d/t immunosuppressant therapy post-op and increased risk for food-borne illness    (2) Avoid the following post txp d/t risk for rejection, unknown effects on the  organs, and/or potential interactions with immunosuppressants:  - Herbal, Chinese, holistic, chiropractic, natural, alternative medicines and supplements  - Detoxes and cleanses  - Weight loss pills  - Protein powders or other products with extracts or herbs (ie green tea extract)    (3) Med regimen and possible side effects    Patient Understanding: Pt verbalized understanding of education provided.  Expected Engagement: Good  Follow-Up Plans: PRN     Nutrition Goals  No nutrition goals identified at this time    Vania Cornejo, RD, LD, CCTD

## 2021-03-23 ENCOUNTER — ALLIED HEALTH/NURSE VISIT (OUTPATIENT)
Dept: TRANSPLANT | Facility: CLINIC | Age: 56
End: 2021-03-23
Attending: INTERNAL MEDICINE
Payer: COMMERCIAL

## 2021-03-23 ENCOUNTER — ANCILLARY PROCEDURE (OUTPATIENT)
Dept: ULTRASOUND IMAGING | Facility: CLINIC | Age: 56
End: 2021-03-23
Attending: INTERNAL MEDICINE
Payer: COMMERCIAL

## 2021-03-23 ENCOUNTER — OFFICE VISIT (OUTPATIENT)
Dept: GASTROENTEROLOGY | Facility: CLINIC | Age: 56
End: 2021-03-23
Attending: INTERNAL MEDICINE
Payer: COMMERCIAL

## 2021-03-23 ENCOUNTER — COMMITTEE REVIEW (OUTPATIENT)
Dept: TRANSPLANT | Facility: CLINIC | Age: 56
End: 2021-03-23

## 2021-03-23 ENCOUNTER — PRE VISIT (OUTPATIENT)
Dept: GASTROENTEROLOGY | Facility: CLINIC | Age: 56
End: 2021-03-23

## 2021-03-23 DIAGNOSIS — R18.8 CIRRHOSIS OF LIVER WITH ASCITES, UNSPECIFIED HEPATIC CIRRHOSIS TYPE (H): ICD-10-CM

## 2021-03-23 DIAGNOSIS — K74.3 PRIMARY BILIARY CHOLANGITIS (H): ICD-10-CM

## 2021-03-23 DIAGNOSIS — K83.01 PSC (PRIMARY SCLEROSING CHOLANGITIS) (H): ICD-10-CM

## 2021-03-23 DIAGNOSIS — R18.8 CIRRHOSIS OF LIVER WITH ASCITES (H): ICD-10-CM

## 2021-03-23 DIAGNOSIS — K74.60 CIRRHOSIS OF LIVER WITH ASCITES (H): ICD-10-CM

## 2021-03-23 DIAGNOSIS — I85.10 SECONDARY ESOPHAGEAL VARICES WITHOUT BLEEDING (H): Primary | ICD-10-CM

## 2021-03-23 DIAGNOSIS — K51.019 ULCERATIVE PANCOLITIS WITH COMPLICATION (H): ICD-10-CM

## 2021-03-23 DIAGNOSIS — K74.60 CIRRHOSIS OF LIVER WITH ASCITES, UNSPECIFIED HEPATIC CIRRHOSIS TYPE (H): ICD-10-CM

## 2021-03-23 DIAGNOSIS — K76.6 PORTAL HYPERTENSION (H): ICD-10-CM

## 2021-03-23 DIAGNOSIS — Z12.9 SCREENING FOR CANCER: ICD-10-CM

## 2021-03-23 DIAGNOSIS — I10 HYPERTENSION: Primary | ICD-10-CM

## 2021-03-23 LAB
ABO + RH BLD: NORMAL
ABO + RH BLD: NORMAL
ALBUMIN SERPL-MCNC: 2 G/DL (ref 3.4–5)
ALP SERPL-CCNC: 494 U/L (ref 40–150)
ALT SERPL W P-5'-P-CCNC: 51 U/L (ref 0–50)
ANION GAP SERPL CALCULATED.3IONS-SCNC: 8 MMOL/L (ref 3–14)
AST SERPL W P-5'-P-CCNC: 75 U/L (ref 0–45)
BILIRUB DIRECT SERPL-MCNC: 3.3 MG/DL (ref 0–0.2)
BILIRUB SERPL-MCNC: 3.9 MG/DL (ref 0.2–1.3)
BLD GP AB SCN SERPL QL: NORMAL
BLOOD BANK CMNT PATIENT-IMP: NORMAL
BUN SERPL-MCNC: 14 MG/DL (ref 7–30)
CALCIUM SERPL-MCNC: 9.3 MG/DL (ref 8.5–10.1)
CHLORIDE SERPL-SCNC: 105 MMOL/L (ref 94–109)
CO2 SERPL-SCNC: 23 MMOL/L (ref 20–32)
CREAT SERPL-MCNC: 0.64 MG/DL (ref 0.52–1.04)
DEPRECATED CALCIDIOL+CALCIFEROL SERPL-MC: 53 UG/L (ref 20–75)
ERYTHROCYTE [DISTWIDTH] IN BLOOD BY AUTOMATED COUNT: 17.5 % (ref 10–15)
GFR SERPL CREATININE-BSD FRML MDRD: >90 ML/MIN/{1.73_M2}
GLUCOSE SERPL-MCNC: 90 MG/DL (ref 70–99)
HCT VFR BLD AUTO: 33.6 % (ref 35–47)
HGB BLD-MCNC: 10.7 G/DL (ref 11.7–15.7)
INR PPP: 1.39 (ref 0.86–1.14)
MCH RBC QN AUTO: 28.1 PG (ref 26.5–33)
MCHC RBC AUTO-ENTMCNC: 31.8 G/DL (ref 31.5–36.5)
MCV RBC AUTO: 88 FL (ref 78–100)
PLATELET # BLD AUTO: 483 10E9/L (ref 150–450)
POTASSIUM SERPL-SCNC: 3.8 MMOL/L (ref 3.4–5.3)
PROT SERPL-MCNC: 7.6 G/DL (ref 6.8–8.8)
RBC # BLD AUTO: 3.81 10E12/L (ref 3.8–5.2)
SODIUM SERPL-SCNC: 136 MMOL/L (ref 133–144)
SPECIMEN EXP DATE BLD: NORMAL
WBC # BLD AUTO: 15.2 10E9/L (ref 4–11)

## 2021-03-23 PROCEDURE — 99000 SPECIMEN HANDLING OFFICE-LAB: CPT | Performed by: PATHOLOGY

## 2021-03-23 PROCEDURE — 99205 OFFICE O/P NEW HI 60 MIN: CPT | Performed by: TRANSPLANT SURGERY

## 2021-03-23 PROCEDURE — 82306 VITAMIN D 25 HYDROXY: CPT | Performed by: PATHOLOGY

## 2021-03-23 PROCEDURE — 86850 RBC ANTIBODY SCREEN: CPT | Performed by: PATHOLOGY

## 2021-03-23 PROCEDURE — 80076 HEPATIC FUNCTION PANEL: CPT | Performed by: PATHOLOGY

## 2021-03-23 PROCEDURE — 99205 OFFICE O/P NEW HI 60 MIN: CPT | Performed by: INTERNAL MEDICINE

## 2021-03-23 PROCEDURE — 86900 BLOOD TYPING SEROLOGIC ABO: CPT | Performed by: PATHOLOGY

## 2021-03-23 PROCEDURE — 80048 BASIC METABOLIC PNL TOTAL CA: CPT | Performed by: PATHOLOGY

## 2021-03-23 PROCEDURE — 85610 PROTHROMBIN TIME: CPT | Performed by: PATHOLOGY

## 2021-03-23 PROCEDURE — 85027 COMPLETE CBC AUTOMATED: CPT | Performed by: PATHOLOGY

## 2021-03-23 PROCEDURE — 93975 VASCULAR STUDY: CPT | Mod: GC | Performed by: RADIOLOGY

## 2021-03-23 PROCEDURE — 86901 BLOOD TYPING SEROLOGIC RH(D): CPT | Performed by: PATHOLOGY

## 2021-03-23 PROCEDURE — 80321 ALCOHOLS BIOMARKERS 1OR 2: CPT | Performed by: PATHOLOGY

## 2021-03-23 PROCEDURE — 86886 COOMBS TEST INDIRECT TITER: CPT | Performed by: PATHOLOGY

## 2021-03-23 PROCEDURE — 36415 COLL VENOUS BLD VENIPUNCTURE: CPT | Performed by: PATHOLOGY

## 2021-03-23 PROCEDURE — 86301 IMMUNOASSAY TUMOR CA 19-9: CPT | Mod: 90 | Performed by: PATHOLOGY

## 2021-03-23 RX ORDER — MULTIVIT WITH MINERALS/LUTEIN
1 TABLET ORAL DAILY
Status: ON HOLD | COMMUNITY
Start: 2017-01-01 | End: 2021-04-30

## 2021-03-23 RX ORDER — MESALAMINE 1.2 G/1
2400 TABLET, DELAYED RELEASE ORAL
COMMUNITY
Start: 2019-01-01 | End: 2021-03-23

## 2021-03-23 RX ORDER — HYDROCHLOROTHIAZIDE 25 MG/1
12.5 TABLET ORAL
COMMUNITY
Start: 2017-01-01 | End: 2021-03-23

## 2021-03-23 RX ORDER — MESALAMINE 1.2 G/1
2400 TABLET, DELAYED RELEASE ORAL DAILY
Qty: 180 TABLET | Refills: 3 | Status: ON HOLD | OUTPATIENT
Start: 2021-03-23 | End: 2021-04-30

## 2021-03-23 RX ORDER — SPIRONOLACTONE 50 MG/1
50 TABLET, FILM COATED ORAL DAILY
Qty: 90 TABLET | Refills: 3 | Status: ON HOLD | OUTPATIENT
Start: 2021-03-23 | End: 2021-04-30

## 2021-03-23 RX ORDER — SPIRONOLACTONE 50 MG/1
50 TABLET, FILM COATED ORAL
COMMUNITY
Start: 2020-01-01 | End: 2021-03-23

## 2021-03-23 RX ORDER — NADOLOL 20 MG/1
TABLET ORAL
Status: ON HOLD | COMMUNITY
Start: 2020-12-01 | End: 2021-04-13

## 2021-03-23 RX ORDER — POTASSIUM CHLORIDE 750 MG/1
10 CAPSULE, EXTENDED RELEASE ORAL AT BEDTIME
Status: ON HOLD | COMMUNITY
Start: 2018-06-01 | End: 2021-04-30

## 2021-03-23 RX ORDER — LISINOPRIL 10 MG/1
10 TABLET ORAL DAILY
Status: ON HOLD | COMMUNITY
Start: 2019-01-02 | End: 2021-04-30

## 2021-03-23 RX ORDER — NADOLOL 20 MG/1
60 TABLET ORAL DAILY
Qty: 270 TABLET | Refills: 3 | Status: ON HOLD | OUTPATIENT
Start: 2021-03-23 | End: 2021-04-30

## 2021-03-23 RX ORDER — URSODIOL 300 MG/1
300 CAPSULE ORAL 2 TIMES DAILY
Qty: 180 CAPSULE | Refills: 3 | Status: ON HOLD | OUTPATIENT
Start: 2021-03-23 | End: 2021-04-30

## 2021-03-23 RX ORDER — FUROSEMIDE 20 MG
20 TABLET ORAL DAILY
Qty: 90 TABLET | Refills: 3 | Status: ON HOLD | OUTPATIENT
Start: 2021-03-23 | End: 2021-04-30

## 2021-03-23 NOTE — PROGRESS NOTES
Psychosocial Assessment for Liver Transplant Evaluation  Berta Nava was seen in the Transplant Center as part of her evaluation as a potential liver transplant recipient.    Living Situation: Berta lives with her  Demar in a house in [place], [place].  Berta denies any concerns with her living situation.  Education/Employment:  Berta graduated high school.  She has worked for [employer] as a  for the past thirty-nine years.  She has been working from home since the beginning of the pandemic.  Financial /Income: Berta and her  both have income from their wages.  Berta denies any financial concerns.  Health Insurance:  [employer].  This writer talked with Berta about the financial risks of transplant, particularly about the high cost of transplant related medications and the importance of maintaining adequate health insurance coverage.  Family/Social Support: Berta and her  Demar have been  for thirty-two years.  He is semi-retired and able to provide care giving.  He does have some limitations (diabetes, obesity and back problems).    Berta and her  have two adult children.  Their daughter Karin lives in [place], [place] and their son Linden lives in [place], [place].  They would provide care giving if called upon.  Berta reports her sister Nadege ([place], [place]) would also be available to assist.  Berta's parents are .  She has one other sister, Seema, who lives in [place].  This writer stressed the importance of having a stable and involved support network before and after transplant.  Provided Berta  with education about the relationship between a stable support system and better surgical and post-transplant outcomes compared to patients with a limited support system.    Functional Status: There are no functional concerns to transplantation.  Chemical Dependency:  Berta denies any history of using tobacco products,  pain medication abuse or illicit drug use.  Berta denies any history of alcohol abuse or dependency.  She reports her last consumption of alcohol was over six years ago.  Berta has no history of chemical dependency treatment, legal consequences of alcohol or drug use.  Mental Health: Berta denies any mental health history.  She denies any history of suicidal ideation or hospitalization for mental health treatment.  Adjustment to Illness:  Berta reports she has learned a great deal from today's appointments.  With the increase in her MELD score she believes a liver transplant will be needed, and she is accepting of this recommendation.  I do not have concerns about Berta's adjustment to illness, though I offered her a referral to a mental health provider as needed throughout this journey.  This writer provided Berta  with supportive counseling throughout this interview.  This writer also encouraged Berta to attend the liver transplant support group for additional support and encouragement.   Impression/Recommendations:   Berta verbalizes understanding the psychosocial risks of transplant and teaching provided during this evaluation.  There are no contraindications to transplant from a psychosocial perspective.  Berta has adequate finances and health insurance for transplant, a stable living situation and identified caregiver support for the pre and post transplant periods.  The Caregiver Agreement for Liver Transplantation was discussed today.  Her  Demar, two adult children and sister (all local) are her identified care givers.  There are no chemical or mental health concerns.    Berta is an acceptable transplant candidate from a psychosocial perspective.  This writer will remain available to assist patient throughout the evaluation process and will follow patient through transplant if she is listed.  It was a pleasure to evaluate this patient for liver transplant.   Teaching completed during  assessment:  1.     Housing and relocation needs post transplant.  2.     Caregiver needs post transplant.  3.     Financial issues related to transplant.  4.     Risks of alcohol use post transplant.  5.     Common psychosocial stressors pre/post transplant.          6.     Liver Transplant support group availability.          7.     [facility] Directive-no health care directive, education and form provided today             Psychosocial Risks of Transplant Reviewed:  1.     Increased stress related to your emotional, family, social, employment, or   financial situation.  2.     Affect on work and/or disability benefits.  3.     Affect on future health and life insurance.  4.     Transplant outcome expectations may not be met.  5.     Mental Health risks: anxiety, depression, PTSD, guilt, grief and chronic fatigue.     MIRTHA Curiel

## 2021-03-23 NOTE — LETTER
3/23/2021         RE: Berta Nava  [address] [facility] [id number]        Dear Colleague,    Thank you for referring your patient, Berta Nava, to the [facility] [place]. Please see a copy of my visit note below.    Date of Service: 3/23/2021     Referring Provider: Venus Casanova    Subjective:            Berta Nava is a 55 year old female presenting for evaluation of liver disease    History of Present Illness   Berta Nava is a 55 year old female with past medical history of hypertension, microscopic colitis, ulcerative colitis, pancreatic mucinous cystadenoma, primary sclerosing cholangitis who presents with concerns of cirrhosis complicated by esophageal varices, ascites.    Notes that she was dealing with issues of fatigue and pruritus in her late 30s, and had issues with abnormal liver tests at that time.  In evaluation as to the cause of her abnormal liver test she was noted to have a pancreatic cyst.  She underwent further evaluation for the pancreatic cyst which demonstrated to be a mucinous cystadenoma, but reports that there was less focus on her overall liver disease.  She ultimately presented with worsening of her liver tests in 2012, underwent a liver biopsy at that time which demonstrated bridging fibrosis and changes consistent with PBC.  She was started on ursodiol at that time.  In approximately 2014 she was having issues with diarrhea and demonstrated mild changes concerning for ulcerative colitis.  In March 2019 she had issues of bright red blood per rectum and a colonoscopy at that time demonstrated overt changes of ulcerative colitis.  She had a surveillance colonoscopy most recently on December 28, 2020 and surveillance biopsies did not demonstrate any activity of disease as the patient is currently on long-acting mesalamine.    She has had some issues with ascites over the past year, and was started on spironolactone  in addition to her baseline hydrochlorothiazide in October 2020.  She follows her weights closely and does feel that she has more bloating or increased girth of her abdomen at this time.  Imaging had demonstrated large lymphadenopathy and she underwent an EUS for further evaluation December 1, 2020.  That exam demonstrated large esophageal varices and the FNA from that exam demonstrated noncaseating granulomas.  She has been seen by pulmonary and the concern is that she may have sarcoidosis.    Denies overt issues with impaired memory or concentration and continues to work full-time.  Does not have significant issues with lower extremity edema.  She follows a low-sodium diet very closely.    She denies a significant history of alcohol use and notes that she is not had any alcohol in approximately 7 years in the setting of her diagnosis of liver disease.  Denies a history of IV inhaled drugs of abuse and denies any history of nicotine use.  She continues to work as an  with [employer] in [place] [address].  She is  and has 2 adult children, age 30 and 26.    MRI of the abdomen was performed on February 4, 2021: No overt liver lesions were noted, hydropic gallbladder was noted with stones in the cystic duct.  There was multifocal irregularity of bilateral ducts.  The spleen measured 13.9 cm, small ascites was present, significant ranjit hepatis nodes were noted, and there was recanalization of the paraumbilical vein.  Her last ERCP was performed on January 6 which demonstrated partially occluded stent with choledocholithiasis and extensive pus throughout the duct system that was removed with balloon extraction and a new stent was inserted.    Past Medical History:  Past Medical History:   Diagnosis Date     Ascites      Biliary cirrhosis (H)      Cirrhosis of liver with ascites (H) 3/3/2021     Hypertension        Surgical History:  Past Surgical History:   Procedure Laterality Date     GYN SURGERY      - BSO    Social History:  Social History     Tobacco Use     Smoking status: Never Smoker     Smokeless tobacco: Never Used   Substance Use Topics     Alcohol use: No     Comment: Last drink was 2017     Drug use: No       Family History:  -Denies a family history of overt liver disease.  Notes that her mother  of complications of an angiosarcoma.  Her father had coronary artery disease with a heart attack at the age of 46.  Ultimately he underwent a heart transplant and lived for 15 years, dying in his 70s.  There is no family history of ulcerative colitis, Crohn's disease.  Unclear history of thyroid disease.    Medications:  Current Outpatient Medications   Medication     furosemide (LASIX) 20 MG tablet     mesalamine (LIALDA) 1.2 g EC tablet     nadolol (CORGARD) 20 MG tablet     spironolactone (ALDACTONE) 50 MG tablet     ursodiol (ACTIGALL) 300 MG capsule     Calcium carb-Vitamin D 500 mg [place]-200 units (OSCAL WITH D;OYSTER SHELL CALCIUM) 500-200 MG-UNIT per tablet     calcium carbonate 600 mg-vitamin D 400 units (CALTRATE) 600-400 MG-UNIT per tablet     guaiFENesin-codeine (ROBITUSSIN AC) 100-10 MG/5ML SOLN     lisinopril (ZESTRIL) 10 MG tablet     multivitamin (CENTRUM SILVER) tablet     multivitamin, therapeutic (THERA-VIT) TABS tablet     nadolol (CORGARD) 20 MG tablet     potassium chloride ER (MICRO-K) 10 MEQ CR capsule     simvastatin (ZOCOR) 20 MG tablet     No current facility-administered medications for this visit.        Review of Systems  A complete 10 point review of systems was asked and answered in the negative unless specifically commented upon in the HPI    Objective:         There were no vitals filed for this visit.  There is no height or weight on file to calculate BMI.     Physical Exam  Constitutional: Well-developed, well-nourished, in no apparent distress.    HEENT: Normocephalic. faint scleral icterus. Moist oral mucosa. Dentition normal  Neck/Lymph: Normal ROM  Cardiac:   Regular rate and rhythm.  Respiratory: Normal respiratory excursion   GI:  Abdomen soft, mild distended, non-tender. BS present.  Skin:  Skin is warm and dry. No rash noted.  no jaundice.   Peripheral Vascular: no lower extremity edema. 2+ pulses in all extremities  Musculoskeletal:  ROM intact, normal muscle bulk    Psychiatric: Normal mood and affect. Behavior is normal.  Neuro:  no asterixis, no tremor    Labs and Diagnostic tests:  Lab Results   Component Value Date     03/23/2021    POTASSIUM 3.8 03/23/2021    CHLORIDE 105 03/23/2021    CO2 23 03/23/2021    BUN 14 03/23/2021    CR 0.64 03/23/2021     Lab Results   Component Value Date    BILITOTAL 3.9 03/23/2021    ALT 51 03/23/2021    AST 75 03/23/2021    ALKPHOS 494 03/23/2021     Lab Results   Component Value Date    ALBUMIN 2.0 03/23/2021    PROTTOTAL 7.6 03/23/2021      Lab Results   Component Value Date    WBC 15.2 03/23/2021    HGB 10.7 03/23/2021    MCV 88 03/23/2021     03/23/2021     Lab Results   Component Value Date    INR 1.39 03/23/2021       MELD-Na score: 16 at 3/23/2021  7:44 AM  MELD score: 15 at 3/23/2021  7:44 AM  Calculated from:  Serum Creatinine: 0.64 mg/dL (Rounded to 1 mg/dL) at 3/23/2021  7:44 AM  Serum Sodium: 136 mmol/L at 3/23/2021  7:44 AM  Total Bilirubin: 3.9 mg/dL at 3/23/2021  7:44 AM  INR(ratio): 1.39 at 3/23/2021  7:44 AM  Age: 55 years 6 months    Imaging:  MRI w/wo IV 2/4/2021  FINDINGS: Image quality is slightly degraded by patient motion.    Liver: Nodular liver surface with diffusely heterogeneous parenchyma, including scattered linear and wedge-shaped areas of T2 hyperintensity. On T1-weighted imaging, there are multiple branching T1 hyperintense areas which are not enhancing on the postcontrast images. No focal liver lesion is identified.    Biliary tree/gallbladder: Hydropic gallbladder with a few small layering gallstones, a few of which are located in the cystic duct. As seen previously, there is  multifocal irregularity of the intrahepatic bile ducts with areas of dilation and stenosis, including severe dilation of a bile duct in the posterior right hepatic lobe with layering stones/sludge.    Remainder of the abdomen: Spleen measures 13.9 cm in length. Normal appearance of the adrenal glands. Few tiny T2 hyperintense cortical renal cysts. Mild circumferential wall thickening and submucosal edema of the ascending colon and a few jejunal loops may represent portal hypertensive enteropathy/colopathy. Small volume ascites, mainly in the perihepatic region. Stable enlarged ranjit hepatis and retroperitoneal lymph nodes, the largest a 4.4 x 2.5 cm heterogeneous ranjit hepatis lymph node on series 18 image 45. Normal caliber aorta. Recanalized paraumbilical vein and esophageal varices are noted.  Bones: No worrisome finding identified.  CHEST: Limited evaluation is unremarkable.      Procedures:  Liver biopsy: 6/4/2012   Liver, needle biopsy:   - Chronic liver disease with bridging fibrosis and architectural     distortion, rule out primary biliary cirrhosis.    EGD: 12/1/2020  - Grade II and large (> 5 mm)                        esophageal varices.                       - Z-line regular, 40 cm from the                        incisors.                       - Portal hypertensive gastropathy.                       - Gastric erosions without bleeding.                        Biopsied.                       - Normal examined duodenum.    EUS 12/1/2020  - Many abnormal lymph nodes were                        visualized in the subcarinal                        mediastinum (level 7), lower                        paraesophageal mediastinum (level                        8L), gastrohepatic ligament (level                        18), peripancreatic region, ranjit                        hepatis region and aortocaval region.                        Fine needle aspiration performed.                        Largest lymph nodes  "periportal or                        aortocaval, approximately 2-3 cm.                        Portal Lymph node aspirated for                        cytology, culture, flow cytometry                       - Hyperechoic material consistent                        with sludge was visualized                        endosonographically in the mid common                        bile duct, causing upstream dilation                        of it to up to 10 mm. Small amount of                        sludge in the gallbladder.                       - Endosonographic imaging of the                        pancreas showed sonographic changes                        consistent with mild-moderate chronic                        pancreatitis. Poorly visualized                        pancreas, specially tail    Colonoscopy: 12/23/2020  Findings:       The terminal ileum appeared normal.       A diffuse area of mildly erythematous mucosa was        found in the descending colon, in the transverse        colon, at the hepatic flexure, in the ascending colon        and in the cecum. This was biopsied with a cold        forceps for histology.       Normal mucosa was found in the rectum. Biopsies were        taken with a cold forceps for histology.       Internal hemorrhoids were found during retroflexion.       No additional abnormalities were found on        retroflexion.    ERCP 1/6/2021  Impression:            - One partially occluded stent from the left hepatic duct was seen in the                        major papilla. Removed.  - Choledocholithiasis was found in the left intrahepatic duct with many                        stones and extensive pus.   - Partial removal was accomplished with balloon extraction; a stent was inserted.    Assessment and Plan:    PSC related Cirrhosis:    -While she reports she was initially diagnosed with PBC in 2012, reviewed the liver biopsy specifically states \"rule out PBC\".  In 2018 she was admitted with " cholangitis, and at that time her diagnosis was changed to PSC.  Prior to this she had been on weight-based ursodiol at 600 mg twice daily, and is now currently taking 300 mg twice daily  -She underwent ERCP in December 2020 and January 2021 and continues to follow with Dr. Tapia for management of recurrent biliary obstruction  -She has decompensated disease with ascites and esophageal varices  -Current M ELD-Na: 16 on todays labs    Ulcerative Colitis:  -She has a remote history of reported microscopic colitis, and in the setting of progressive symptoms biopsies demonstrated changes consistent with ulcerative colitis in 2014 and again in 2019.  She has been on a 5-ASA product since that time and biopsies from December 28, 2020 did demonstrated quiesced sent disease  -She continues to follow with Dr. London in the [facility]  -At the patient's request I have renewed her prescription for mesalamine    Cancer Risk  Of note 80% of patients with PSC will be diagnosed with UC during their lifetime.  We discussed in detail that both PSC and UC convey an increased risk of cholangiocarcinoma and colon cancer, respectively.  I also relayed that when patients have both PSC and UC, their cancer risk increases even higher.  Lifetime risk of the development of cholangiocarcinoma in patients with PSC is approximately 10% lifetime, and while there is little data to support screening, it is felt prudent to enter to patient into a regimen of routine imaging.  We also discussed the occasional use of ursodeoxycholic acid in patients with PSC, and that the data is controversial.  While some studies have shown improvement in liver tests, there has been no demonstrated benefit in delayed time to development of cirrhosis or liver transplantation.  CCA/HCC screening in patients with PSC  - Agree with screening for cholangiocarcinoma in this patient.  We would recommend the following regimen:   - YEARLY MRI/MRCP    - YEARLY abdominal  ultrasound with dopplers: this should be performed every alternate 6 months between MRIs.  This can be performed locally and should be an exam of the complete abdomen, commenting upon spleen size   - Twice yearly  and AFP (at time of imaging studies)  (please note there is no specific recommendation from the AASLD or other GI professional society to support this screening regimen.)      Liver Transplant Candidacy:   - We discussed need for transplantation, organ availability and prioritization process for liver transplantation in the [place].  We discussed the guiding principals of justice and equality that dictate transplant candidacy  - We discussed the MELD score, the variables that are followed, and how the MELD score impacts transplantation  - We discussed the evaluation process for candidacy  - We discussed donor types - including  donors (DBD, DCD) and living donors  - We discussed factors that can impact quality of organs available, including extended criteria donor.  -She reports that she is blood type ABO AB; we are waiting to confirm this  -After she completes evaluation we will discuss her case in our transplant selection conference.  At this time she is clinically doing well, and we will need to closely weigh the risks versus benefits of transplantation as she will likely have an offer shortly after weight listing with her blood type    Hepatocellular Cancer Screening:   -As per above; MRI from  without evidence of hepatic masses  - Recommend screening for HCC every 6 months with either abdominal ultrasound or by alternating abdominal ultrasound with EITHER a triple/quad phase CT Liver with IV contrast OR a Quad phase MRI Liver with IV contrast.  AFP levels should be checked every 6 months at time of imaging screen.    Ascites:  -She has had issues with this in the past and was started on spironolactone 50 mg daily in 2020  -Based on her symptoms and bloating, felt  prudent to discontinue her hydrochlorothiazide and start her on furosemide 20 mg daily while continuing spironolactone  - Continue to follow a sodium restricted (<2g sodium diet)     Hepatic Encephalopathy:  -No acute issues at this time; she was educated as to the warning signs and symptoms    Esophageal Varices:   -She does have large varices on US from December 2020  -She was started on nadolol for prophylaxis  -If she has large varices on her next endoscopic evaluation would recommend variceal band ligation, as these seem to be her major sign of portal hypertension    Nutrition:  As with most patients with chronic liver disease, there is a significant degree of protein malnutrition.  Dicussed need to change dietary habits to improve overall protein balance.  Discussed the importance of eating between 1.2-1.5g/kg/day lean protein like eggs, fish, chicken, nuts, and legumes, in addition to a diet rich in fresh fruits and vegetables.  Continue to follow a sodium restricted (<2g sodium diet) and discussed the need to minimize the intake of carbohydrates and sugars, to avoid obesity.   - Strongly encourage protein supplements 2-3 times daily (Boost, Ensure, [place] Instant Milk, etc.) to meet protein and caloric intake.  - Recommend a bedtime snack with protein and complex carbohydrate to minimize risk of muscle catabolism overnight    Routine Health Care in Patient with Chronic Liver Disease:  - We recommend screening for hepatitis A and B, please vaccinate if not immune  - All patients with liver disease, particularly those with cholestatic liver disease, are at an increased risk for osteoporosis.  We strongly recommend screening for Vitamin D deficiency at least twice yearly with aggressive supplementation/replacement as indicated.    - We also recommend a screening DEXA scan to evaluate for osteoporosis.  If present, should treat with calcium, Vitamin D supplementation, and recommend consideration of  bisphosphonate therapy.  Also recommend follow up DEXA scans to evaluate for improvement of bone density on therapy.  - All patients with liver disease should avoid the use of Non-steroidal Anti-Inflammatory (NSAID) medications as they can cause significant injury to the kidneys in this population    Follow Up:  3 months     Thank you very much for the opportunity to participate in the care of this patient.  If you have any further questions, please don't hesitate to contact our office.    Thomas M. Leventhal, M.D.   of Medicine  Advanced & Transplant Hepatology  [facility]       Approximately 45 minutes of non face-to-face time were spent in review of the patient's medical record to date.  This included review of previous: clinic visits, hospital records, lab results, imaging studies, and procedural documentation.  The findings from this review are summarized in the above note.        Again, thank you for allowing me to participate in the care of your patient.        Sincerely,        Thomas M. Leventhal, MD

## 2021-03-23 NOTE — Clinical Note
otis set up DSE & virtual cards consult for as soon as possible. She is going to call and set up tap at [facility] or [facility]. Thanks, tk

## 2021-03-23 NOTE — COMMITTEE REVIEW
Abdominal Committee Review Note     Evaluation Date: 3/23/2021  Committee Review Date: 3/23/2021    Organ being evaluated for: Liver    Transplant Phase: Evaluation  Transplant Status: Active    Transplant Coordinator: Wilmer Lazar Jr.  Transplant Surgeon:   Ernesto Schmitt    Referring Physician: Venus London    Primary Diagnosis: Primary Sclerosing Cholangitis: Ulcerative Colitis  Secondary Diagnosis:     Committee Review Members:  Hepatology Emma Mao MD   Nutrition Vania Cornejo, RD   Pharmacy Dana Trujillo, [facility]    - Clinical Helene Denney, MSCARMEN, Katie Mejia, [facility]   Transplant Marjorie Reyes, RN, Slick Kidd MD, Lucy James, RN, Monalisa Bar, RN, Scott Puri MD, Jr Wilmer Lazar, ADRIANA, Simona Traylor MD, Andria Rowe, APRN CNP, Sue Romero, RN, Fabio Gotti MD, Ricardo Contreras MD, Ernesto Schmitt MD, Taqueria Peterson MD, Thomas M. Leventhal, MD, Gonzalo Mackay MD, Sri Wilks MD       Transplant Eligibility: Cirrhosis with MELD, PSC, UC    Committee Review Decision: Needs Re-presentation    Relative Contraindications: Other, pendign eval    Absolute Contraindications: None    Committee Chair Leventhal, Thomas Michael, MD verbally attested to the committee's decision.    Committee Discussion Details:     DSE & cards clearance    Review images at tumor conf     Re-discuss pending full evaluation & tumor conf review

## 2021-03-23 NOTE — PROGRESS NOTES
Assessment and Plan:  1. liver transplant evaluation - patient is a good candidate overall. Benefits and surgical risks of a liver transplantation were discussed.  2.  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 Portal Vein:Patent  2. Hepatic Artery: Open  3. TIPS: absent  4. Previous Abdominal Surgery: Yes; ovaries and fallopian tube taken out  5. Hepatocellular Carcinoma: None  6. Ascites: Present - minimal  7. Costal Angle: narrow  8. Portopulmonary Hypertension: absent  9. Hepatopulmonary Syndrome: absent  10. Cardiac Evaluation: needs stress echocardiogram  11. Nutritional Status: Good  12. Diabetes: no  13.Hypertension yes  14. Smoker:no  14: Fraility index:no  15. Meets guidelines to receive Living Donor  Yes -  ....  16. Potential Living donors Yes -  2 adult children  MELD-Na score: 16 at 3/23/2021  7:44 AM  MELD score: 15 at 3/23/2021  7:44 AM  Calculated from:  Serum Creatinine: 0.64 mg/dL (Rounded to 1 mg/dL) at 3/23/2021  7:44 AM  Serum Sodium: 136 mmol/L at 3/23/2021  7:44 AM  Total Bilirubin: 3.9 mg/dL at 3/23/2021  7:44 AM  INR(ratio): 1.39 at 3/23/2021  7:44 AM  Age: 55 years 6 months  Recommendations: complete work up and list; she was told to be blood type AB, if that is true, she has a good chance of receiving a liver on the wait list      Patients overall evaluation will be discussed at the Liver Transplant selection committee meeting with a final recommendation on the patients suitability for transplant to be made at that time.    Consult Full  Details:  Berta Nava was seen in consultation at the request of Dr. Regina URRUTIA  for evaluation as a potential liver transplant recipient.    Reason for Visit:  Berta Nava is a 55 year old year old female with primary sclerosing cholangitis, who presents for liver transplant evaluation.    HPI:  Presenting complaint: cholangitis    May of 2018, diagnosed to have PSC, [place]  [id number].  Has had cholangitis and decompensated with ascites, placed on diuretics. Lost about 14 lb; has had 2 ERCP and EUS    Past Medical History:   Diagnosis Date     Ascites      Biliary cirrhosis (H)      Cirrhosis of liver with ascites (H) 3/3/2021     Hypertension      Past Surgical History:   Procedure Laterality Date     GYN SURGERY       Past Surgical History:   Procedure Laterality Date     GYN SURGERY       No family history on file.  Allergies   Allergen Reactions     Diagnostic X-Ray Materials Hives     PATIENT HAD HIVE REACTION AFTER ADMINISTERING CT CONTRAST DYE.       Contrast Dye      Prior to Admission medications    Medication Sig Start Date End Date Taking? Authorizing Provider   calcium carbonate 600 mg-vitamin D 400 units (CALTRATE) 600-400 MG-UNIT per tablet  1/1/17  Yes Reported, Patient   hydrochlorothiazide (HYDRODIURIL) 25 MG tablet Take 12.5 mg by mouth 1/1/17  Yes Reported, Patient   lisinopril (ZESTRIL) 10 MG tablet Take 10 mg by mouth 1/2/19  Yes Reported, Patient   mesalamine (LIALDA) 1.2 g EC tablet Take 2,400 mg by mouth 1/1/19  Yes Reported, Patient   multivitamin (CENTRUM SILVER) tablet  1/1/17  Yes Reported, Patient   nadolol (CORGARD) 20 MG tablet Take 3 tablets by mouth daily 12/1/20  Yes Reported, Patient   potassium chloride ER (MICRO-K) 10 MEQ CR capsule Take 10 mEq by mouth 6/1/18  Yes Reported, Patient   simvastatin (ZOCOR) 20 MG tablet Take 20 mg by mouth At Bedtime   Yes Unknown, Entered By History   spironolactone (ALDACTONE) 50 MG tablet Take 50 mg by mouth 1/1/20  Yes Reported, Patient   ursodiol (ACTIGALL) 300 MG capsule Take 600 mg by mouth 2 times daily   Yes Unknown, Entered By History   Calcium carb-Vitamin D 500 mg [place]-200 units (OSCAL WITH D;OYSTER SHELL CALCIUM) 500-200 MG-UNIT per tablet Take 1 tablet by mouth daily    Unknown, Entered By History   guaiFENesin-codeine (ROBITUSSIN AC) 100-10 MG/5ML SOLN Take 1 tsp. by mouth nightly as needed for cough     Unknown, Entered By History   multivitamin, therapeutic (THERA-VIT) TABS tablet Take 1 tablet by mouth daily    Unknown, Entered By History       Previous Transplant Hx: No    Cardiovascular Hx:       h/o Cardiac Issues: No       Exercise Tolerance: shortness of breath with exertion.    Potential Donor(s): No    ROS:    REVIEW OF SYSTEMS (check box if normal)  [x]                GENERAL  [x]                  PULMONARY [x]                 GENITOURINARY  [x]                 CNS                 [x]                  CARDIAC  [x]                  ENDOCRINE  [x]                 EARS,NOSE,THROAT [x]                  GASTROINTESTINAL [x]                  NEUROLOGIC    [x]                 MUSCLOSKELTAL  [x]                   HEMATOLOGY    Examination:     Vitals:  There were no vitals taken for this visit.    GENERAL APPEARANCE: alert and no distress  EYES: PERRL  HENT: mouth without ulcers or lesions  NECK: supple, no adenopathy  RESP: lungs clear to auscultation - no rales, rhonchi or wheezes  CV: regular rhythm, normal rate, no rub   ABDOMEN:  soft, nontender, no HSM or masses and bowel sounds normal  MS: extremities normal- no gross deformities noted, no evidence of inflammation in joints, no muscle tenderness  SKIN: no rash  NEURO: Normal strength and tone, sensory exam grossly normal, mentation intact and speech normal  PSYCH: mentation appears normal. and affect normal/bright      Results:   Recent Results (from the past 168 hour(s))   CBC with platelets    Collection Time: 03/23/21  7:44 AM   Result Value Ref Range    WBC 15.2 (H) 4.0 - 11.0 10e9/L    RBC Count 3.81 3.8 - 5.2 10e12/L    Hemoglobin 10.7 (L) 11.7 - 15.7 g/dL    Hematocrit 33.6 (L) 35.0 - 47.0 %    MCV 88 78 - 100 fl    MCH 28.1 26.5 - 33.0 pg    MCHC 31.8 31.5 - 36.5 g/dL    RDW 17.5 (H) 10.0 - 15.0 %    Platelet Count 483 (H) 150 - 450 10e9/L   Hepatic panel    Collection Time: 03/23/21  7:44 AM   Result Value Ref Range    Bilirubin Direct 3.3 (H) 0.0  - 0.2 mg/dL    Bilirubin Total 3.9 (H) 0.2 - 1.3 mg/dL    Albumin 2.0 (L) 3.4 - 5.0 g/dL    Protein Total 7.6 6.8 - 8.8 g/dL    Alkaline Phosphatase 494 (H) 40 - 150 U/L    ALT 51 (H) 0 - 50 U/L    AST 75 (H) 0 - 45 U/L   Basic metabolic panel    Collection Time: 03/23/21  7:44 AM   Result Value Ref Range    Sodium 136 133 - 144 mmol/L    Potassium 3.8 3.4 - 5.3 mmol/L    Chloride 105 94 - 109 mmol/L    Carbon Dioxide 23 20 - 32 mmol/L    Anion Gap 8 3 - 14 mmol/L    Glucose 90 70 - 99 mg/dL    Urea Nitrogen 14 7 - 30 mg/dL    Creatinine 0.64 0.52 - 1.04 mg/dL    GFR Estimate >90 >60 mL/min/[1.73_m2]    GFR Estimate If Black >90 >60 mL/min/[1.73_m2]    Calcium 9.3 8.5 - 10.1 mg/dL   INR    Collection Time: 03/23/21  7:44 AM   Result Value Ref Range    INR 1.39 (H) 0.86 - 1.14     I had a long discussion with the patient regarding liver transplantation which included but was not limited to  the following points:    1. Liver transplant selection committee process.  2. The federal rules for cadaveric waiting list, the size and blood type matching of the organ. The availability of living-related donor transplantation.  3. The types of donors: brain death donors, non-heart beating donors, partial liver grafts: splits and living donor grafts  4. Extended criteria  Donors (older age, steasosis) and the increased  risk of primary non-function using the extended criteria donors  5. The CDC high risk donors,  Risk of donor transmitted infections and donor transmitted malignancy  6. The liver transplant operation and the associated risks and technical complications which can include intraoperative death, post operative death,  Primary non-function, bleeding requiring re-operations, arterial and biliary complications, bowel perforations, and intra abdominal abscess. Some of these complicaitons may require a second operation.  7. The postoperative course, the ICU stay and risk of postoperative complications which can include  sepsis, MI, stroke, brain injury, pneumonia, pleural effusions, and renal dysfunction.  8. The current 1 year and 5 year graft and patient survivals.  9. The need for life long immunosuppressive therapy and the side effects of these medications, including the possibility of toxicity, opportunistic infections, risk of cancer including lymphoma, and the possibility of rejection even if the patient is taking the medication exactly as prescribed.  10. The need for compliance with medications and follow-up visits in the clinic and thereafter.  11. The patient and family understand these risks and wish to proceed to transplantation     Review of prior external note(s) from - [facility] information from [employer] reviewed  60  minutes spent on the date of the encounter doing chart review, history and exam, documentation and further activities as noted above

## 2021-03-23 NOTE — PATIENT INSTRUCTIONS
- please stop the hydrochlorathiazide     - Please start furosemide 20mg daily    - Have renewed GI meds    - Continue to follow with Loreto Tapia and Surya

## 2021-03-23 NOTE — PROGRESS NOTES
Date of Service: 3/23/2021     Referring Provider: Venus Casanova    Subjective:            Berta Nava is a 55 year old female presenting for evaluation of liver disease    History of Present Illness   Berta Nava is a 55 year old female with past medical history of hypertension, microscopic colitis, ulcerative colitis, pancreatic mucinous cystadenoma, primary sclerosing cholangitis who presents with concerns of cirrhosis complicated by esophageal varices, ascites.    Notes that she was dealing with issues of fatigue and pruritus in her late 30s, and had issues with abnormal liver tests at that time.  In evaluation as to the cause of her abnormal liver test she was noted to have a pancreatic cyst.  She underwent further evaluation for the pancreatic cyst which demonstrated to be a mucinous cystadenoma, but reports that there was less focus on her overall liver disease.  She ultimately presented with worsening of her liver tests in 2012, underwent a liver biopsy at that time which demonstrated bridging fibrosis and changes consistent with PBC.  She was started on ursodiol at that time.  In approximately 2014 she was having issues with diarrhea and demonstrated mild changes concerning for ulcerative colitis.  In March 2019 she had issues of bright red blood per rectum and a colonoscopy at that time demonstrated overt changes of ulcerative colitis.  She had a surveillance colonoscopy most recently on December 28, 2020 and surveillance biopsies did not demonstrate any activity of disease as the patient is currently on long-acting mesalamine.    She has had some issues with ascites over the past year, and was started on spironolactone in addition to her baseline hydrochlorothiazide in October 2020.  She follows her weights closely and does feel that she has more bloating or increased girth of her abdomen at this time.  Imaging had demonstrated large lymphadenopathy and she underwent an EUS for  further evaluation December 1, 2020.  That exam demonstrated large esophageal varices and the FNA from that exam demonstrated noncaseating granulomas.  She has been seen by pulmonary and the concern is that she may have sarcoidosis.    Denies overt issues with impaired memory or concentration and continues to work full-time.  Does not have significant issues with lower extremity edema.  She follows a low-sodium diet very closely.    She denies a significant history of alcohol use and notes that she is not had any alcohol in approximately 7 years in the setting of her diagnosis of liver disease.  Denies a history of IV inhaled drugs of abuse and denies any history of nicotine use.  She continues to work as an  with [employer] in [place].  She is  and has 2 adult children, age 30 and 26.    MRI of the abdomen was performed on February 4, 2021: No overt liver lesions were noted, hydropic gallbladder was noted with stones in the cystic duct.  There was multifocal irregularity of bilateral ducts.  The spleen measured 13.9 cm, small ascites was present, significant ranjit hepatis nodes were noted, and there was recanalization of the paraumbilical vein.  Her last ERCP was performed on January 6 which demonstrated partially occluded stent with choledocholithiasis and extensive pus throughout the duct system that was removed with balloon extraction and a new stent was inserted.    Past Medical History:  Past Medical History:   Diagnosis Date     Ascites      Biliary cirrhosis (H)      Cirrhosis of liver with ascites (H) 3/3/2021     Hypertension        Surgical History:  Past Surgical History:   Procedure Laterality Date     GYN SURGERY     - BSO    Social History:  Social History     Tobacco Use     Smoking status: Never Smoker     Smokeless tobacco: Never Used   Substance Use Topics     Alcohol use: No     Comment: Last drink was 2017     Drug use: No       Family History:  -Denies a family  history of overt liver disease.  Notes that her mother  of complications of an angiosarcoma.  Her father had coronary artery disease with a heart attack at the age of 46.  Ultimately he underwent a heart transplant and lived for 15 years, dying in his 70s.  There is no family history of ulcerative colitis, Crohn's disease.  Unclear history of thyroid disease.    Medications:  Current Outpatient Medications   Medication     furosemide (LASIX) 20 MG tablet     mesalamine (LIALDA) 1.2 g EC tablet     nadolol (CORGARD) 20 MG tablet     spironolactone (ALDACTONE) 50 MG tablet     ursodiol (ACTIGALL) 300 MG capsule     Calcium carb-Vitamin D 500 mg [place]-200 units (OSCAL WITH D;OYSTER SHELL CALCIUM) 500-200 MG-UNIT per tablet     calcium carbonate 600 mg-vitamin D 400 units (CALTRATE) 600-400 MG-UNIT per tablet     guaiFENesin-codeine (ROBITUSSIN AC) 100-10 MG/5ML SOLN     lisinopril (ZESTRIL) 10 MG tablet     multivitamin (CENTRUM SILVER) tablet     multivitamin, therapeutic (THERA-VIT) TABS tablet     nadolol (CORGARD) 20 MG tablet     potassium chloride ER (MICRO-K) 10 MEQ CR capsule     simvastatin (ZOCOR) 20 MG tablet     No current facility-administered medications for this visit.        Review of Systems  A complete 10 point review of systems was asked and answered in the negative unless specifically commented upon in the HPI    Objective:         There were no vitals filed for this visit.  There is no height or weight on file to calculate BMI.     Physical Exam  Constitutional: Well-developed, well-nourished, in no apparent distress.    HEENT: Normocephalic. faint scleral icterus. Moist oral mucosa. Dentition normal  Neck/Lymph: Normal ROM  Cardiac:  Regular rate and rhythm.  Respiratory: Normal respiratory excursion   GI:  Abdomen soft, mild distended, non-tender. BS present.  Skin:  Skin is warm and dry. No rash noted.  no jaundice.   Peripheral Vascular: no lower extremity edema. 2+ pulses in all  extremities  Musculoskeletal:  ROM intact, normal muscle bulk    Psychiatric: Normal mood and affect. Behavior is normal.  Neuro:  no asterixis, no tremor    Labs and Diagnostic tests:  Lab Results   Component Value Date     03/23/2021    POTASSIUM 3.8 03/23/2021    CHLORIDE 105 03/23/2021    CO2 23 03/23/2021    BUN 14 03/23/2021    CR 0.64 03/23/2021     Lab Results   Component Value Date    BILITOTAL 3.9 03/23/2021    ALT 51 03/23/2021    AST 75 03/23/2021    ALKPHOS 494 03/23/2021     Lab Results   Component Value Date    ALBUMIN 2.0 03/23/2021    PROTTOTAL 7.6 03/23/2021      Lab Results   Component Value Date    WBC 15.2 03/23/2021    HGB 10.7 03/23/2021    MCV 88 03/23/2021     03/23/2021     Lab Results   Component Value Date    INR 1.39 03/23/2021       MELD-Na score: 16 at 3/23/2021  7:44 AM  MELD score: 15 at 3/23/2021  7:44 AM  Calculated from:  Serum Creatinine: 0.64 mg/dL (Rounded to 1 mg/dL) at 3/23/2021  7:44 AM  Serum Sodium: 136 mmol/L at 3/23/2021  7:44 AM  Total Bilirubin: 3.9 mg/dL at 3/23/2021  7:44 AM  INR(ratio): 1.39 at 3/23/2021  7:44 AM  Age: 55 years 6 months    Imaging:  MRI w/wo IV 2/4/2021  FINDINGS: Image quality is slightly degraded by patient motion.    Liver: Nodular liver surface with diffusely heterogeneous parenchyma, including scattered linear and wedge-shaped areas of T2 hyperintensity. On T1-weighted imaging, there are multiple branching T1 hyperintense areas which are not enhancing on the postcontrast images. No focal liver lesion is identified.    Biliary tree/gallbladder: Hydropic gallbladder with a few small layering gallstones, a few of which are located in the cystic duct. As seen previously, there is multifocal irregularity of the intrahepatic bile ducts with areas of dilation and stenosis, including severe dilation of a bile duct in the posterior right hepatic lobe with layering stones/sludge.    Remainder of the abdomen: Spleen measures 13.9 cm in  length. Normal appearance of the adrenal glands. Few tiny T2 hyperintense cortical renal cysts. Mild circumferential wall thickening and submucosal edema of the ascending colon and a few jejunal loops may represent portal hypertensive enteropathy/colopathy. Small volume ascites, mainly in the perihepatic region. Stable enlarged ranjit hepatis and retroperitoneal lymph nodes, the largest a 4.4 x 2.5 cm heterogeneous ranjit hepatis lymph node on series 18 image 45. Normal caliber aorta. Recanalized paraumbilical vein and esophageal varices are noted.  Bones: No worrisome finding identified.  CHEST: Limited evaluation is unremarkable.      Procedures:  Liver biopsy: 6/4/2012   Liver, needle biopsy:   - Chronic liver disease with bridging fibrosis and architectural     distortion, rule out primary biliary cirrhosis.    EGD: 12/1/2020  - Grade II and large (> 5 mm)                        esophageal varices.                       - Z-line regular, 40 cm from the                        incisors.                       - Portal hypertensive gastropathy.                       - Gastric erosions without bleeding.                        Biopsied.                       - Normal examined duodenum.    EUS 12/1/2020  - Many abnormal lymph nodes were                        visualized in the subcarinal                        mediastinum (level 7), lower                        paraesophageal mediastinum (level                        8L), gastrohepatic ligament (level                        18), peripancreatic region, ranjit                        hepatis region and aortocaval region.                        Fine needle aspiration performed.                        Largest lymph nodes periportal or                        aortocaval, approximately 2-3 cm.                        Portal Lymph node aspirated for                        cytology, culture, flow cytometry                       - Hyperechoic material consistent                       "  with sludge was visualized                        endosonographically in the mid common                        bile duct, causing upstream dilation                        of it to up to 10 mm. Small amount of                        sludge in the gallbladder.                       - Endosonographic imaging of the                        pancreas showed sonographic changes                        consistent with mild-moderate chronic                        pancreatitis. Poorly visualized                        pancreas, specially tail    Colonoscopy: 12/23/2020  Findings:       The terminal ileum appeared normal.       A diffuse area of mildly erythematous mucosa was        found in the descending colon, in the transverse        colon, at the hepatic flexure, in the ascending colon        and in the cecum. This was biopsied with a cold        forceps for histology.       Normal mucosa was found in the rectum. Biopsies were        taken with a cold forceps for histology.       Internal hemorrhoids were found during retroflexion.       No additional abnormalities were found on        retroflexion.    ERCP 1/6/2021  Impression:            - One partially occluded stent from the left hepatic duct was seen in the                        major papilla. Removed.  - Choledocholithiasis was found in the left intrahepatic duct with many                        stones and extensive pus.   - Partial removal was accomplished with balloon extraction; a stent was inserted.    Assessment and Plan:    PSC related Cirrhosis:    -While she reports she was initially diagnosed with PBC in 2012, reviewed the liver biopsy specifically states \"rule out PBC\".  In 2018 she was admitted with cholangitis, and at that time her diagnosis was changed to PSC.  Prior to this she had been on weight-based ursodiol at 600 mg twice daily, and is now currently taking 300 mg twice daily  -She underwent ERCP in December 2020 and January 2021 and continues to " follow with Dr. Tapia for management of recurrent biliary obstruction  -She has decompensated disease with ascites and esophageal varices  -Current M ELD-Na: 16 on todays labs    Ulcerative Colitis:  -She has a remote history of reported microscopic colitis, and in the setting of progressive symptoms biopsies demonstrated changes consistent with ulcerative colitis in 2014 and again in 2019.  She has been on a 5-ASA product since that time and biopsies from December 28, 2020 did demonstrated quiesced sent disease  -She continues to follow with Dr. London in the [facility]  -At the patient's request I have renewed her prescription for mesalamine    Cancer Risk  Of note 80% of patients with PSC will be diagnosed with UC during their lifetime.  We discussed in detail that both PSC and UC convey an increased risk of cholangiocarcinoma and colon cancer, respectively.  I also relayed that when patients have both PSC and UC, their cancer risk increases even higher.  Lifetime risk of the development of cholangiocarcinoma in patients with PSC is approximately 10% lifetime, and while there is little data to support screening, it is felt prudent to enter to patient into a regimen of routine imaging.  We also discussed the occasional use of ursodeoxycholic acid in patients with PSC, and that the data is controversial.  While some studies have shown improvement in liver tests, there has been no demonstrated benefit in delayed time to development of cirrhosis or liver transplantation.  CCA/HCC screening in patients with PSC  - Agree with screening for cholangiocarcinoma in this patient.  We would recommend the following regimen:   - YEARLY MRI/MRCP    - YEARLY abdominal ultrasound with dopplers: this should be performed every alternate 6 months between MRIs.  This can be performed locally and should be an exam of the complete abdomen, commenting upon spleen size   - Twice yearly  and AFP (at time of imaging  studies)  (please note there is no specific recommendation from the AASLD or other GI professional society to support this screening regimen.)      Liver Transplant Candidacy:   - We discussed need for transplantation, organ availability and prioritization process for liver transplantation in the [place].  We discussed the guiding principals of justice and equality that dictate transplant candidacy  - We discussed the MELD score, the variables that are followed, and how the MELD score impacts transplantation  - We discussed the evaluation process for candidacy  - We discussed donor types - including  donors (DBD, DCD) and living donors  - We discussed factors that can impact quality of organs available, including extended criteria donor.  -She reports that she is blood type ABO AB; we are waiting to confirm this  -After she completes evaluation we will discuss her case in our transplant selection conference.  At this time she is clinically doing well, and we will need to closely weigh the risks versus benefits of transplantation as she will likely have an offer shortly after weight listing with her blood type    Hepatocellular Cancer Screening:   -As per above; MRI from  without evidence of hepatic masses  - Recommend screening for HCC every 6 months with either abdominal ultrasound or by alternating abdominal ultrasound with EITHER a triple/quad phase CT Liver with IV contrast OR a Quad phase MRI Liver with IV contrast.  AFP levels should be checked every 6 months at time of imaging screen.    Ascites:  -She has had issues with this in the past and was started on spironolactone 50 mg daily in 2020  -Based on her symptoms and bloating, felt prudent to discontinue her hydrochlorothiazide and start her on furosemide 20 mg daily while continuing spironolactone  - Continue to follow a sodium restricted (<2g sodium diet)     Hepatic Encephalopathy:  -No acute issues at this time; she was  educated as to the warning signs and symptoms    Esophageal Varices:   -She does have large varices on US from December 2020  -She was started on nadolol for prophylaxis  -If she has large varices on her next endoscopic evaluation would recommend variceal band ligation, as these seem to be her major sign of portal hypertension    Nutrition:  As with most patients with chronic liver disease, there is a significant degree of protein malnutrition.  Dicussed need to change dietary habits to improve overall protein balance.  Discussed the importance of eating between 1.2-1.5g/kg/day lean protein like eggs, fish, chicken, nuts, and legumes, in addition to a diet rich in fresh fruits and vegetables.  Continue to follow a sodium restricted (<2g sodium diet) and discussed the need to minimize the intake of carbohydrates and sugars, to avoid obesity.   - Strongly encourage protein supplements 2-3 times daily (Boost, Ensure, [place] Instant Milk, etc.) to meet protein and caloric intake.  - Recommend a bedtime snack with protein and complex carbohydrate to minimize risk of muscle catabolism overnight    Routine Health Care in Patient with Chronic Liver Disease:  - We recommend screening for hepatitis A and B, please vaccinate if not immune  - All patients with liver disease, particularly those with cholestatic liver disease, are at an increased risk for osteoporosis.  We strongly recommend screening for Vitamin D deficiency at least twice yearly with aggressive supplementation/replacement as indicated.    - We also recommend a screening DEXA scan to evaluate for osteoporosis.  If present, should treat with calcium, Vitamin D supplementation, and recommend consideration of bisphosphonate therapy.  Also recommend follow up DEXA scans to evaluate for improvement of bone density on therapy.  - All patients with liver disease should avoid the use of Non-steroidal Anti-Inflammatory (NSAID) medications as they can cause significant  injury to the kidneys in this population    Follow Up:  3 months     Thank you very much for the opportunity to participate in the care of this patient.  If you have any further questions, please don't hesitate to contact our office.    Thomas M. Leventhal, M.D.   of Medicine  Advanced & Transplant Hepatology  [facility]       Approximately 45 minutes of non face-to-face time were spent in review of the patient's medical record to date.  This included review of previous: clinic visits, hospital records, lab results, imaging studies, and procedural documentation.  The findings from this review are summarized in the above note.

## 2021-03-23 NOTE — LETTER
3/23/2021         RE: Berta Nava  [address] [facility] [id number]        Dear Colleague,    Thank you for referring your patient, Berta Nava, to the [facility]. Please see a copy of my visit note below.    Assessment and Plan:  1. liver transplant evaluation - patient is a good candidate overall. Benefits and surgical risks of a liver transplantation were discussed.  2.  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 Portal Vein:Patent  2. Hepatic Artery: Open  3. TIPS: absent  4. Previous Abdominal Surgery: Yes; ovaries and fallopian tube taken out  5. Hepatocellular Carcinoma: None  6. Ascites: Present - minimal  7. Costal Angle: narrow  8. Portopulmonary Hypertension: absent  9. Hepatopulmonary Syndrome: absent  10. Cardiac Evaluation: needs stress echocardiogram  11. Nutritional Status: Good  12. Diabetes: no  13.Hypertension yes  14. Smoker:no  14: Fraility index:no  15. Meets guidelines to receive Living Donor  Yes -  ....  16. Potential Living donors Yes -  2 adult children  MELD-Na score: 16 at 3/23/2021  7:44 AM  MELD score: 15 at 3/23/2021  7:44 AM  Calculated from:  Serum Creatinine: 0.64 mg/dL (Rounded to 1 mg/dL) at 3/23/2021  7:44 AM  Serum Sodium: 136 mmol/L at 3/23/2021  7:44 AM  Total Bilirubin: 3.9 mg/dL at 3/23/2021  7:44 AM  INR(ratio): 1.39 at 3/23/2021  7:44 AM  Age: 55 years 6 months  Recommendations: complete work up and list; she was told to be blood type AB, if that is true, she has a good chance of receiving a liver on the wait list      Patients overall evaluation will be discussed at the Liver Transplant selection committee meeting with a final recommendation on the patients suitability for transplant to be made at that time.    Consult Full  Details:  Berta Nava was seen in consultation at the request of Dr. Regina URRUTIA  for evaluation as a potential liver transplant recipient.    Reason for  Visit:  Berta Nava is a 55 year old year old female with primary sclerosing cholangitis, who presents for liver transplant evaluation.    HPI:  Presenting complaint: cholangitis    May of 2018, diagnosed to have PSC, was diagnosed to have PBC  [place] [address].  Has had cholangitis and decompensated with ascites, placed on diuretics. Lost about 14 lb; has had 2 ERCP and EUS    Past Medical History:   Diagnosis Date     Ascites      Biliary cirrhosis (H)      Cirrhosis of liver with ascites (H) 3/3/2021     Hypertension      Past Surgical History:   Procedure Laterality Date     GYN SURGERY       Past Surgical History:   Procedure Laterality Date     GYN SURGERY       No family history on file.  Allergies   Allergen Reactions     Diagnostic X-Ray Materials Hives     PATIENT HAD HIVE REACTION AFTER ADMINISTERING CT CONTRAST DYE.       Contrast Dye      Prior to Admission medications    Medication Sig Start Date End Date Taking? Authorizing Provider   calcium carbonate 600 mg-vitamin D 400 units (CALTRATE) 600-400 MG-UNIT per tablet  1/1/17  Yes Reported, Patient   hydrochlorothiazide (HYDRODIURIL) 25 MG tablet Take 12.5 mg by mouth 1/1/17  Yes Reported, Patient   lisinopril (ZESTRIL) 10 MG tablet Take 10 mg by mouth 1/2/19  Yes Reported, Patient   mesalamine (LIALDA) 1.2 g EC tablet Take 2,400 mg by mouth 1/1/19  Yes Reported, Patient   multivitamin (CENTRUM SILVER) tablet  1/1/17  Yes Reported, Patient   nadolol (CORGARD) 20 MG tablet Take 3 tablets by mouth daily 12/1/20  Yes Reported, Patient   potassium chloride ER (MICRO-K) 10 MEQ CR capsule Take 10 mEq by mouth 6/1/18  Yes Reported, Patient   simvastatin (ZOCOR) 20 MG tablet Take 20 mg by mouth At Bedtime   Yes Unknown, Entered By History   spironolactone (ALDACTONE) 50 MG tablet Take 50 mg by mouth 1/1/20  Yes Reported, Patient   ursodiol (ACTIGALL) 300 MG capsule Take 600 mg by mouth 2 times daily   Yes Unknown, Entered By History   Calcium carb-Vitamin D 500 mg  [place]-200 units (OSCAL WITH D;OYSTER SHELL CALCIUM) 500-200 MG-UNIT per tablet Take 1 tablet by mouth daily    Unknown, Entered By History   guaiFENesin-codeine (ROBITUSSIN AC) 100-10 MG/5ML SOLN Take 1 tsp. by mouth nightly as needed for cough    Unknown, Entered By History   multivitamin, therapeutic (THERA-VIT) TABS tablet Take 1 tablet by mouth daily    Unknown, Entered By History       Previous Transplant Hx: No    Cardiovascular Hx:       h/o Cardiac Issues: No       Exercise Tolerance: shortness of breath with exertion.    Potential Donor(s): No    ROS:    REVIEW OF SYSTEMS (check box if normal)  [x]                GENERAL  [x]                  PULMONARY [x]                 GENITOURINARY  [x]                 CNS                 [x]                  CARDIAC  [x]                  ENDOCRINE  [x]                 EARS,NOSE,THROAT [x]                  GASTROINTESTINAL [x]                  NEUROLOGIC    [x]                 MUSCLOSKELTAL  [x]                   HEMATOLOGY    Examination:     Vitals:  There were no vitals taken for this visit.    GENERAL APPEARANCE: alert and no distress  EYES: PERRL  HENT: mouth without ulcers or lesions  NECK: supple, no adenopathy  RESP: lungs clear to auscultation - no rales, rhonchi or wheezes  CV: regular rhythm, normal rate, no rub   ABDOMEN:  soft, nontender, no HSM or masses and bowel sounds normal  MS: extremities normal- no gross deformities noted, no evidence of inflammation in joints, no muscle tenderness  SKIN: no rash  NEURO: Normal strength and tone, sensory exam grossly normal, mentation intact and speech normal  PSYCH: mentation appears normal. and affect normal/bright      Results:   Recent Results (from the past 168 hour(s))   CBC with platelets    Collection Time: 03/23/21  7:44 AM   Result Value Ref Range    WBC 15.2 (H) 4.0 - 11.0 10e9/L    RBC Count 3.81 3.8 - 5.2 10e12/L    Hemoglobin 10.7 (L) 11.7 - 15.7 g/dL    Hematocrit 33.6 (L) 35.0 - 47.0 %    MCV 88 78 -  100 fl    MCH 28.1 26.5 - 33.0 pg    MCHC 31.8 31.5 - 36.5 g/dL    RDW 17.5 (H) 10.0 - 15.0 %    Platelet Count 483 (H) 150 - 450 10e9/L   Hepatic panel    Collection Time: 03/23/21  7:44 AM   Result Value Ref Range    Bilirubin Direct 3.3 (H) 0.0 - 0.2 mg/dL    Bilirubin Total 3.9 (H) 0.2 - 1.3 mg/dL    Albumin 2.0 (L) 3.4 - 5.0 g/dL    Protein Total 7.6 6.8 - 8.8 g/dL    Alkaline Phosphatase 494 (H) 40 - 150 U/L    ALT 51 (H) 0 - 50 U/L    AST 75 (H) 0 - 45 U/L   Basic metabolic panel    Collection Time: 03/23/21  7:44 AM   Result Value Ref Range    Sodium 136 133 - 144 mmol/L    Potassium 3.8 3.4 - 5.3 mmol/L    Chloride 105 94 - 109 mmol/L    Carbon Dioxide 23 20 - 32 mmol/L    Anion Gap 8 3 - 14 mmol/L    Glucose 90 70 - 99 mg/dL    Urea Nitrogen 14 7 - 30 mg/dL    Creatinine 0.64 0.52 - 1.04 mg/dL    GFR Estimate >90 >60 mL/min/[1.73_m2]    GFR Estimate If Black >90 >60 mL/min/[1.73_m2]    Calcium 9.3 8.5 - 10.1 mg/dL   INR    Collection Time: 03/23/21  7:44 AM   Result Value Ref Range    INR 1.39 (H) 0.86 - 1.14     I had a long discussion with the patient regarding liver transplantation which included but was not limited to  the following points:    1. Liver transplant selection committee process.  2. The federal rules for cadaveric waiting list, the size and blood type matching of the organ. The availability of living-related donor transplantation.  3. The types of donors: brain death donors, non-heart beating donors, partial liver grafts: splits and living donor grafts  4. Extended criteria  Donors (older age, steasosis) and the increased  risk of primary non-function using the extended criteria donors  5. The CDC high risk donors,  Risk of donor transmitted infections and donor transmitted malignancy  6. The liver transplant operation and the associated risks and technical complications which can include intraoperative death, post operative death,  Primary non-function, bleeding requiring re-operations,  arterial and biliary complications, bowel perforations, and intra abdominal abscess. Some of these complicaitons may require a second operation.  7. The postoperative course, the ICU stay and risk of postoperative complications which can include sepsis, MI, stroke, brain injury, pneumonia, pleural effusions, and renal dysfunction.  8. The current 1 year and 5 year graft and patient survivals.  9. The need for life long immunosuppressive therapy and the side effects of these medications, including the possibility of toxicity, opportunistic infections, risk of cancer including lymphoma, and the possibility of rejection even if the patient is taking the medication exactly as prescribed.  10. The need for compliance with medications and follow-up visits in the clinic and thereafter.  11. The patient and family understand these risks and wish to proceed to transplantation     [facility]  60  minutes spent on the date of the encounter doing chart review, history and exam, documentation and further activities as noted above        Again, thank you for allowing me to participate in the care of your patient.        Sincerely,        Ernesto Schmitt MD

## 2021-03-24 LAB
BLD GP AB SCN TITR SERPL: NORMAL {TITER}
CANCER AG19-9 SERPL-ACNC: 62 U/ML (ref 0–37)

## 2021-03-25 LAB — PETH BLD-MCNC: NEGATIVE NG/ML

## 2021-03-26 ENCOUNTER — TELEPHONE (OUTPATIENT)
Dept: TRANSPLANT | Facility: CLINIC | Age: 56
End: 2021-03-26

## 2021-03-26 ENCOUNTER — DOCUMENTATION ONLY (OUTPATIENT)
Dept: TRANSPLANT | Facility: CLINIC | Age: 56
End: 2021-03-26

## 2021-03-26 DIAGNOSIS — K83.01 PSC (PRIMARY SCLEROSING CHOLANGITIS) (H): Primary | ICD-10-CM

## 2021-03-26 NOTE — PROGRESS NOTES
Berta Nava discussed at the multidisciplinary tumor board on 3/26/21. The attendees may consist of representatives from hepatology, oncology, radiation oncology, surgical subspecialty including liver transplant, pathology and radiology.    Imaging Reveiwed:  No suspicious features  Complex anatomy    Plan/Recommendations:  MRI/MRCP - evaluative bile ducts     PEBBLES BhattN, RN  Liver transplant coordinator

## 2021-03-26 NOTE — TELEPHONE ENCOUNTER
Spoke with Berta and informed her she will be contacted to set up MRI/MRCP. She does have an allergy to Iopamidol, and pre medicated with CT.     She has had MRIs with no issues and contacted MRI tech and different contrast, therefore no pre med needed.

## 2021-03-29 NOTE — TELEPHONE ENCOUNTER
Spoke with the patient and confirmed MRI appointments on 4/12/21. (This was first available)  Informed patient an itinerary can be accessed on [email or web address].

## 2021-04-07 DIAGNOSIS — Z11.59 ENCOUNTER FOR SCREENING FOR OTHER VIRAL DISEASES: ICD-10-CM

## 2021-04-08 ENCOUNTER — TELEPHONE (OUTPATIENT)
Dept: TRANSPLANT | Facility: CLINIC | Age: 56
End: 2021-04-08

## 2021-04-08 DIAGNOSIS — Z11.59 ENCOUNTER FOR SCREENING FOR OTHER VIRAL DISEASES: ICD-10-CM

## 2021-04-08 LAB
SARS-COV-2 RNA RESP QL NAA+PROBE: NORMAL
SPECIMEN SOURCE: NORMAL

## 2021-04-08 PROCEDURE — U0003 INFECTIOUS AGENT DETECTION BY NUCLEIC ACID (DNA OR RNA); SEVERE ACUTE RESPIRATORY SYNDROME CORONAVIRUS 2 (SARS-COV-2) (CORONAVIRUS DISEASE [COVID-19]), AMPLIFIED PROBE TECHNIQUE, MAKING USE OF HIGH THROUGHPUT TECHNOLOGIES AS DESCRIBED BY CMS-2020-01-R: HCPCS | Performed by: INTERNAL MEDICINE

## 2021-04-08 PROCEDURE — U0005 INFEC AGEN DETEC AMPLI PROBE: HCPCS | Performed by: INTERNAL MEDICINE

## 2021-04-08 NOTE — TELEPHONE ENCOUNTER
Spoke with the patient and confirmed Dobutamine Stress Echo on 4/20/21 and Cardiology appointments on 4/26/21. Reviewed DSE Prep instructions with patient. Informed patient an itinerary can be accessed on Plugaroundt.

## 2021-04-09 LAB
LABORATORY COMMENT REPORT: NORMAL
SARS-COV-2 RNA RESP QL NAA+PROBE: NEGATIVE
SPECIMEN SOURCE: NORMAL

## 2021-04-09 NOTE — TELEPHONE ENCOUNTER
Spoke with the patient and confirmed MRI appointments on 4/22/21.  Informed patient an itinerary can be accessed on [employer].

## 2021-04-12 ENCOUNTER — TELEPHONE (OUTPATIENT)
Dept: MEDSURG UNIT | Facility: CLINIC | Age: 56
End: 2021-04-12

## 2021-04-12 NOTE — TELEPHONE ENCOUNTER
Action    Action Taken 4-12: requested 4 most recent EKGs from PN  4-15: sent EKGs to scanning

## 2021-04-12 NOTE — TELEPHONE ENCOUNTER
Pre-Procedure Negative COVID Test Results    Results Reviewed  The patient has a negative COVID test result within the required timeframe for the scheduled procedure.     Called patient to verify that arrival time for procedure is 30 minutes prior to procedure time. Patient verbalized understanding and will arrive at 10:00 AM.    NANCY Grossman RN

## 2021-04-13 ENCOUNTER — HOSPITAL ENCOUNTER (OUTPATIENT)
Dept: ULTRASOUND IMAGING | Facility: CLINIC | Age: 56
End: 2021-04-13
Attending: INTERNAL MEDICINE
Payer: COMMERCIAL

## 2021-04-13 ENCOUNTER — HOSPITAL ENCOUNTER (OUTPATIENT)
Facility: CLINIC | Age: 56
Discharge: HOME | End: 2021-04-13
Admitting: RADIOLOGY
Payer: COMMERCIAL

## 2021-04-13 VITALS
SYSTOLIC BLOOD PRESSURE: 93 MMHG | HEART RATE: 71 BPM | DIASTOLIC BLOOD PRESSURE: 44 MMHG | TEMPERATURE: 95.9 F | RESPIRATION RATE: 16 BRPM | OXYGEN SATURATION: 99 %

## 2021-04-13 DIAGNOSIS — R18.8 CIRRHOSIS OF LIVER WITH ASCITES, UNSPECIFIED HEPATIC CIRRHOSIS TYPE (H): ICD-10-CM

## 2021-04-13 DIAGNOSIS — K74.60 CIRRHOSIS OF LIVER WITH ASCITES, UNSPECIFIED HEPATIC CIRRHOSIS TYPE (H): ICD-10-CM

## 2021-04-13 PROCEDURE — 272N000706 US PARACENTESIS

## 2021-04-13 PROCEDURE — 999N000154 HC STATISTIC RADIOLOGY XRAY, US, CT, MAR, NM

## 2021-04-13 PROCEDURE — 250N000011 HC RX IP 250 OP 636: Performed by: INTERNAL MEDICINE

## 2021-04-13 PROCEDURE — P9047 ALBUMIN (HUMAN), 25%, 50ML: HCPCS | Performed by: INTERNAL MEDICINE

## 2021-04-13 RX ORDER — LIDOCAINE HYDROCHLORIDE 10 MG/ML
10 INJECTION, SOLUTION EPIDURAL; INFILTRATION; INTRACAUDAL; PERINEURAL ONCE
Status: COMPLETED | OUTPATIENT
Start: 2021-04-13 | End: 2021-04-13

## 2021-04-13 RX ORDER — LIDOCAINE 40 MG/G
CREAM TOPICAL
Status: DISCONTINUED | OUTPATIENT
Start: 2021-04-13 | End: 2021-04-13 | Stop reason: HOSPADM

## 2021-04-13 RX ORDER — ALBUMIN (HUMAN) 12.5 G/50ML
12.5 SOLUTION INTRAVENOUS ONCE
Status: COMPLETED | OUTPATIENT
Start: 2021-04-13 | End: 2021-04-13

## 2021-04-13 RX ADMIN — LIDOCAINE HYDROCHLORIDE 10 ML: 10 INJECTION, SOLUTION EPIDURAL; INFILTRATION; INTRACAUDAL; PERINEURAL at 11:05

## 2021-04-13 RX ADMIN — ALBUMIN HUMAN 12.5 G: 0.25 SOLUTION INTRAVENOUS at 11:45

## 2021-04-13 NOTE — DISCHARGE INSTRUCTIONS

## 2021-04-13 NOTE — PROGRESS NOTES
Care Suites Admission Nursing Note    Patient Information  Name: Berta Nava  Age: 55 year old  Reason for admission: Paracentesis.  Care Suites arrival time: 1005      Patient Admission/Assessment   Pre-procedure assessment complete: Yes  If abnormal assessment/labs, provider notified: N/A  NPO: N/A  Medications held per instructions/orders: N/A  Consent: deferred  If applicable, pregnancy test status: deferred  Patient oriented to room: Yes  Education/questions answered: Yes  Plan/other: Proceed as scheduled.  This is her first paracentesis.  She does have a therapy plan to given albumin when 4L or greater is removed.  Will start IV post procedure if needed per patient request.    Discharge Planning  Discharge name/phone number: Rima [phone]  Overnight post sedation caregiver: CONRADO  Discharge location: home    Beatris Cheung RN       1056  AVS/Discharge instructions given and reviewed.  All questions and concerns addressed at this time with verbal understanding received.

## 2021-04-13 NOTE — DISCHARGE SUMMARY
Patient received one unit 12.5mg albumin    Care Suites Discharge Nursing Note    Patient Information  Name: Berta Nava  Age: 55 year old    Discharge Education:  Discharge instructions reviewed: Yes  Additional education/resources provided: no  Patient/patient representative verbalizes understanding: Yes  Patient discharging on new medications: No  Medication education completed: N/A    Discharge Plans:   Discharge location: home  Discharge ride contacted: Yes  Approximate discharge time: 1215    Discharge Criteria:  Discharge criteria met and vital signs stable: Yes    Patient Belongs:  Patient belongings returned to patient: Yes    Ever Amin RN

## 2021-04-13 NOTE — PROGRESS NOTES
Care Suites Post Procedure Summary (without sedation)     Immediately prior to starting the procedure a Time Out was conducted with procedural staff and re-confirmed the patient s name, procedure, and site/side.      Consent obtained from patient after discussing the risks, benefits and alternatives.      Procedure: Paracentesis      Patient tolerance: Patient tolerated the procedure well with no immediate complications.     Post-procedure report given to: Agustin WRIGHT and pt transferred to Care Suites by Lashon WYNN    (See Doc Flow-sheets and MAR for additional information)      Paracentesis complete.  4,750 ml removed from right side of abd.   light in color.  Tolerated procedure well, VSS.  Band aid to site CDI, denies pain.   Pt will receive one bag of albumin per MD order.

## 2021-04-15 ENCOUNTER — IMMUNIZATION (OUTPATIENT)
Dept: NURSING | Facility: CLINIC | Age: 56
End: 2021-04-15
Attending: INTERNAL MEDICINE
Payer: COMMERCIAL

## 2021-04-15 PROCEDURE — 91301 PR COVID VAC MODERNA 100 MCG/0.5 ML IM: CPT

## 2021-04-15 PROCEDURE — 0012A PR COVID VAC MODERNA 100 MCG/0.5 ML IM: CPT

## 2021-04-20 ENCOUNTER — HOSPITAL ENCOUNTER (OUTPATIENT)
Dept: CARDIOLOGY | Facility: CLINIC | Age: 56
Discharge: HOME | End: 2021-04-20
Attending: INTERNAL MEDICINE | Admitting: INTERNAL MEDICINE
Payer: COMMERCIAL

## 2021-04-20 DIAGNOSIS — K74.3 PRIMARY BILIARY CHOLANGITIS (H): ICD-10-CM

## 2021-04-20 DIAGNOSIS — K74.60 CIRRHOSIS OF LIVER WITH ASCITES, UNSPECIFIED HEPATIC CIRRHOSIS TYPE (H): ICD-10-CM

## 2021-04-20 DIAGNOSIS — I10 HYPERTENSION: ICD-10-CM

## 2021-04-20 DIAGNOSIS — R18.8 CIRRHOSIS OF LIVER WITH ASCITES, UNSPECIFIED HEPATIC CIRRHOSIS TYPE (H): ICD-10-CM

## 2021-04-20 PROCEDURE — 93016 CV STRESS TEST SUPVJ ONLY: CPT | Performed by: INTERNAL MEDICINE

## 2021-04-20 PROCEDURE — 255N000002 HC RX 255 OP 636: Performed by: INTERNAL MEDICINE

## 2021-04-20 PROCEDURE — 93018 CV STRESS TEST I&R ONLY: CPT | Performed by: INTERNAL MEDICINE

## 2021-04-20 PROCEDURE — 93325 DOPPLER ECHO COLOR FLOW MAPG: CPT | Mod: 26 | Performed by: INTERNAL MEDICINE

## 2021-04-20 PROCEDURE — 93350 STRESS TTE ONLY: CPT | Mod: 26 | Performed by: INTERNAL MEDICINE

## 2021-04-20 PROCEDURE — 93321 DOPPLER ECHO F-UP/LMTD STD: CPT | Mod: 26 | Performed by: INTERNAL MEDICINE

## 2021-04-20 PROCEDURE — 250N000009 HC RX 250: Performed by: INTERNAL MEDICINE

## 2021-04-20 PROCEDURE — 93321 DOPPLER ECHO F-UP/LMTD STD: CPT | Mod: TC

## 2021-04-20 PROCEDURE — 250N000011 HC RX IP 250 OP 636: Performed by: INTERNAL MEDICINE

## 2021-04-20 RX ORDER — ATROPINE SULFATE 0.4 MG/ML
.2-2 AMPUL (ML) INJECTION
Status: COMPLETED | OUTPATIENT
Start: 2021-04-20 | End: 2021-04-20

## 2021-04-20 RX ORDER — DOBUTAMINE HYDROCHLORIDE 200 MG/100ML
10-50 INJECTION INTRAVENOUS CONTINUOUS
Status: ACTIVE | OUTPATIENT
Start: 2021-04-20 | End: 2021-04-20

## 2021-04-20 RX ORDER — METOPROLOL TARTRATE 1 MG/ML
1-20 INJECTION, SOLUTION INTRAVENOUS
Status: ACTIVE | OUTPATIENT
Start: 2021-04-20 | End: 2021-04-20

## 2021-04-20 RX ADMIN — DOBUTAMINE HYDROCHLORIDE 10 MCG/KG/MIN: 200 INJECTION INTRAVENOUS at 09:53

## 2021-04-20 RX ADMIN — ATROPINE SULFATE 0.3 MG: 0.4 INJECTION, SOLUTION INTRAMUSCULAR; INTRAVENOUS; SUBCUTANEOUS at 10:01

## 2021-04-20 RX ADMIN — METOPROLOL TARTRATE 2 MG: 1 INJECTION, SOLUTION INTRAVENOUS at 10:08

## 2021-04-20 RX ADMIN — HUMAN ALBUMIN MICROSPHERES AND PERFLUTREN 8 ML: 10; .22 INJECTION, SOLUTION INTRAVENOUS at 10:08

## 2021-04-20 NOTE — PROGRESS NOTES
Pt here for dobutamine stress test.  Test, meds and side effects reviewed with patient.  Achieved target HR at 30 mcg Dobutamine and a total of 0.3 mg IV atropine.  Gave a total of 2 mg IV Metoprolol to bring HR back to baseline.  Post monitoring complete and VSS.  Pt escorted out to the gold waiting room.

## 2021-04-22 ENCOUNTER — HOSPITAL ENCOUNTER (OUTPATIENT)
Dept: MRI IMAGING | Facility: CLINIC | Age: 56
Discharge: HOME | End: 2021-04-22
Attending: INTERNAL MEDICINE | Admitting: INTERNAL MEDICINE
Payer: COMMERCIAL

## 2021-04-22 DIAGNOSIS — K83.01 PSC (PRIMARY SCLEROSING CHOLANGITIS) (H): ICD-10-CM

## 2021-04-22 PROCEDURE — 74183 MRI ABD W/O CNTR FLWD CNTR: CPT

## 2021-04-22 PROCEDURE — A9585 GADOBUTROL INJECTION: HCPCS | Performed by: INTERNAL MEDICINE

## 2021-04-22 PROCEDURE — 74183 MRI ABD W/O CNTR FLWD CNTR: CPT | Mod: 26 | Performed by: RADIOLOGY

## 2021-04-22 PROCEDURE — 255N000002 HC RX 255 OP 636: Performed by: INTERNAL MEDICINE

## 2021-04-22 RX ORDER — GADOBUTROL 604.72 MG/ML
7.5 INJECTION INTRAVENOUS ONCE
Status: COMPLETED | OUTPATIENT
Start: 2021-04-22 | End: 2021-04-22

## 2021-04-22 RX ADMIN — GADOBUTROL 6.5 ML: 604.72 INJECTION INTRAVENOUS at 08:13

## 2021-04-26 ENCOUNTER — PRE VISIT (OUTPATIENT)
Dept: CARDIOLOGY | Facility: CLINIC | Age: 56
End: 2021-04-26

## 2021-04-26 ENCOUNTER — VIRTUAL VISIT (OUTPATIENT)
Dept: CARDIOLOGY | Facility: CLINIC | Age: 56
End: 2021-04-26
Attending: INTERNAL MEDICINE
Payer: COMMERCIAL

## 2021-04-26 DIAGNOSIS — Z11.59 ENCOUNTER FOR SCREENING FOR OTHER VIRAL DISEASES: Primary | ICD-10-CM

## 2021-04-26 DIAGNOSIS — K74.60 CIRRHOSIS OF LIVER WITH ASCITES, UNSPECIFIED HEPATIC CIRRHOSIS TYPE (H): ICD-10-CM

## 2021-04-26 DIAGNOSIS — R18.8 CIRRHOSIS OF LIVER WITH ASCITES, UNSPECIFIED HEPATIC CIRRHOSIS TYPE (H): ICD-10-CM

## 2021-04-26 DIAGNOSIS — K74.3 PRIMARY BILIARY CHOLANGITIS (H): ICD-10-CM

## 2021-04-26 DIAGNOSIS — Z01.810 PRE-OPERATIVE CARDIOVASCULAR EXAMINATION: Primary | ICD-10-CM

## 2021-04-26 DIAGNOSIS — Z76.82 LIVER TRANSPLANT CANDIDATE: ICD-10-CM

## 2021-04-26 PROCEDURE — 99204 OFFICE O/P NEW MOD 45 MIN: CPT | Mod: 95 | Performed by: INTERNAL MEDICINE

## 2021-04-26 NOTE — PROGRESS NOTES
Berta is a 55 year old who is being evaluated via a billable video visit.      How would you like to obtain your AVS? Mail a copy  If the video visit is dropped, the invitation should be resent by: Send to e-mail at: [email or web address]  Will anyone else be joining your video visit? No      Video-Visit Details    Type of service:  Video Visit  Video Start Time: 4 PM  Video End Time: 4:15 PM    Originating Location (pt. Location):     Distant Location (provider location):  [facility]     Platform used for Video Visit: Naverus      HPI: Ms. 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  She has no prior history of cardiac disease.  She tells me that she recently developed ascites and has had paracentesis once.  She is expecting to have the second paracentesis soon.  Since the onset of ascites she feels mildly short of breath with activity.  Denies chest pain, dizziness, syncope, lower extremity edema. She tells me that yesterday she was able to go up and down the stairs in her home several times doing laundry she was fine with that.  Lifetime non-smoker.  Hypertension well controlled with lisinopril 10 mg.  She is started on Lasix 20 mg recently.  Other medications are spironolactone 50 mg, simvastatin 20, potassium chloride and nadolol 60 mg daily.  The patient's risk factor profile is: (-) HTN, (-) diabetes, (-) hyperlipidemia, (-) tobacco use, (+) family Hx CAD.      Father had MI at age of 46.  Patient's father had heart transplant and lived for 15 years.    I have reviewed patient's dobutamine stress echocardiogram from 4/20/2021.  Shows a structurally normal heart.   Stress test is normal with no inducible ischemia.  No evidence of pulmonary hypertension.  RV size and function is normal.    EKG 11/20/2020 shows sinus rhythm otherwise normal.    Medications, personal, family, and social history reviewed with patient and revised.    PAST MEDICAL HISTORY:  Past Medical History:   Diagnosis Date     Ascites      Biliary cirrhosis (H)      Cholangitis, sclerosing      Cirrhosis of liver with ascites (H) 3/3/2021     Hearing loss of left ear     wears a hearing aide     History of low potassium      Hyperlipidemia      Hypertension      Sjogren's syndrome (H)      Ulcerative colitis (H)      Ulcerative pancolitis (H)        CURRENT MEDICATIONS:  Current Outpatient Medications   Medication Sig Dispense Refill     calcium carbonate 600 mg-vitamin D 400 units (CALTRATE) 600-400 MG-UNIT per tablet Take 1 tablet by mouth daily        furosemide (LASIX) 20 MG tablet Take 1 tablet (20 mg) by mouth daily 90 tablet 3     lisinopril (ZESTRIL) 10 MG tablet Take 10 mg by mouth       mesalamine (LIALDA) 1.2 g EC tablet Take 2 tablets (2,400 mg) by mouth daily 180 tablet 3     multivitamin (CENTRUM SILVER) tablet Take 1 tablet by mouth daily        nadolol (CORGARD) 20 MG tablet Take 3 tablets (60 mg) by mouth daily 270 tablet 3     potassium chloride ER (MICRO-K) 10 MEQ CR capsule Take 10 mEq by mouth At Bedtime        simvastatin (ZOCOR) 20 MG tablet Take 20 mg by mouth At Bedtime       spironolactone (ALDACTONE) 50 MG tablet Take 1 tablet (50 mg) by mouth daily 90 tablet 3     ursodiol (ACTIGALL) 300 MG capsule Take 1 capsule (300 mg) by mouth 2 times daily 180 capsule 3       PAST SURGICAL HISTORY:  Past Surgical History:   Procedure Laterality Date     GYN SURGERY      bilat fallopian tubes and ovaries removed       ALLERGIES:     Allergies   Allergen Reactions     Diagnostic X-Ray Materials Hives     PATIENT HAD HIVE REACTION AFTER ADMINISTERING CT CONTRAST DYE.       Contrast Dye         FAMILY HISTORY:  No family history on file.      SOCIAL HISTORY:  Social History     Tobacco Use     Smoking status: Never Smoker     Smokeless tobacco: Never Used   Substance Use Topics     Alcohol use: No     Comment: Last drink was 2017     Drug use: No       ROS:   Constitutional: No fever, chills, or sweats. Weight stable.   ENT: No visual disturbance, ear ache, epistaxis, sore throat.   Cardiovascular: As per HPI.   Respiratory: No cough, hemoptysis.    GI: No nausea, vomiting, hematemesis, melena, or hematochezia.   : No hematuria.   Integument: Negative.   Psychiatric: Negative.   Hematologic:  No easy bruising, no easy bleeding.  Neuro: Negative.   Endocrinology: No significant heat or cold intolerance   Musculoskeletal: No myalgia.    Exam:  Physical Exam Elements attainable via telehealth:       Constitutional - alert and no distress    Eyes - no redness, no discharge    Respiratory - no cough, no labored breathing    Skin - no discoloration or lesions on the face or the arm.    Neurological -alert, normal speech,and affect, no tremor.     I have reviewed the labs and personally reviewed the imaging below and made my comment in the assessment and plan.    Labs:  CBC RESULTS:   Lab Results   Component Value Date    WBC 15.2 (H) 03/23/2021    RBC 3.81 03/23/2021    HGB 10.7 (L) 03/23/2021    HCT 33.6 (L) 03/23/2021    MCV 88 03/23/2021    MCH 28.1 03/23/2021    MCHC 31.8 03/23/2021    RDW 17.5 (H) 03/23/2021     (H) 03/23/2021       BMP RESULTS:  Lab Results   Component Value Date     03/23/2021    POTASSIUM 3.8 03/23/2021    CHLORIDE 105 03/23/2021    CO2 23 03/23/2021    ANIONGAP 8 03/23/2021    GLC 90 03/23/2021    BUN 14 03/23/2021    CR 0.64 03/23/2021    GFRESTIMATED >90 03/23/2021    GFRESTBLACK >90 03/23/2021    MARIELENA 9.3 03/23/2021        INR RESULTS:  Lab Results   Component Value Date    INR 1.39 (H) 03/23/2021    INR 1.22 (H) 05/13/2018       Dobutamine stress echocardiogram  4/20/2021  Dobutamine stress echocardiogram with no inducible ischemia.   Target heart rate achieved. Hypotensive response to dobutamine   No angina symptoms during stress test.   No ECG evidence of ischemia at rest or with dobutamine.   Normal segmental and global left ventricular systolic function at baseline,   LVEF 55-60%.   With peak dobutamine, LVEF increased further to >70% and LV cavity size   decreased appropriately.   No stress induced regional wall motion abnormalities.   Normal aortic root and no significant valvular dysfunction noted on screening   2D and Doppler examination.     Assessment and Plan:     #Primary sclerosing cholangitis related decompensated liver cirrhosis   #Liver transplant candidate  #Preoperative cardiovascular evaluation  -Patient reports dyspnea on exertion since the onset of ascites.  MG resolves with paracentesis.  Currently she still reports normal functional capacity.  Dobutamine stress echocardiogram is normal.  No evidence of pulmonary hypertension.  Patient can proceed to planned liver transplant from cardiac standpoint.    # Mild anemia hemoglobin 10.7    #History of hypertension  -Continue current treatment with lisinopril 10 mg    Total time spent 30 minutes precharting, face-to-face video visit and medical documentation.      Please donot hesitate to contact me if you have any questions or concerns. Again, thank you for allowing me to participate in the care of your patient.    Mason OWENS MD  [facility] Division of Cardiology  Pager [phone]

## 2021-04-26 NOTE — PROGRESS NOTES
"Berta is a 55 year old who is being evaluated via a billable video visit.      How would you like to obtain your AVS? MyChart  If the video visit is dropped, the invitation should be resent by: Send to e-mail at: [email or web address]  Will anyone else be joining your video visit? No  Vitals - Patient Reported  Weight (Patient Reported): 68 kg (150 lb)  Height (Patient Reported): 165.1 cm (5' 5\")  BMI (Based on Pt Reported Ht/Wt): 24.96  Pain Score: No Pain (0)  Pain Loc: Chest        "

## 2021-04-26 NOTE — LETTER
4/26/2021      RE: Berta Nava  [address] [facility] [id number]       Dear Colleague,    Thank you for the opportunity to participate in the care of your patient, Berta Nava, at the [facility] at . Please see a copy of my visit note below.    Berta is a 55 year old who is being evaluated via a billable video visit.      How would you like to obtain your AVS? Mail a copy  If the video visit is dropped, the invitation should be resent by: Send to e-mail at: [email or web address]  Will anyone else be joining your video visit? No      Video-Visit Details    Type of service:  Video Visit  Video Start Time: 4 PM  Video End Time: 4:15 PM    Originating Location (pt. Location):     Distant Location (provider location):  [facility]     Platform used for Video Visit: [employer]      HPI: Ms. 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  She has no prior history of cardiac disease.  She tells me that she recently developed ascites and has had paracentesis once.  She is expecting to have the second paracentesis soon.  Since the onset of ascites she feels mildly short of breath with activity.  Denies chest pain, dizziness, syncope, lower extremity edema. She tells me that yesterday she was able to go up and down the stairs in her home several times doing laundry she was fine with that.  Lifetime non-smoker.  Hypertension well controlled with lisinopril 10 mg.  She is started on Lasix 20 mg recently.  Other medications are spironolactone 50 mg, simvastatin 20, potassium chloride and  nadolol 60 mg daily.  The patient's risk factor profile is: (-) HTN, (-) diabetes, (-) hyperlipidemia, (-) tobacco use, (+) family Hx CAD.      Father had MI at age of 46.  Patient's father had heart transplant and lived for 15 years.    I have reviewed patient's dobutamine stress echocardiogram from 4/20/2021.  Shows a structurally normal heart.  Stress test is normal with no inducible ischemia.  No evidence of pulmonary hypertension.  RV size and function is normal.    EKG 11/20/2020 shows sinus rhythm otherwise normal.    Medications, personal, family, and social history reviewed with patient and revised.    PAST MEDICAL HISTORY:  Past Medical History:   Diagnosis Date     Ascites      Biliary cirrhosis (H)      Cholangitis, sclerosing      Cirrhosis of liver with ascites (H) 3/3/2021     Hearing loss of left ear     wears a hearing aide     History of low potassium      Hyperlipidemia      Hypertension      Sjogren's syndrome (H)      Ulcerative colitis (H)      Ulcerative pancolitis (H)        CURRENT MEDICATIONS:  Current Outpatient Medications   Medication Sig Dispense Refill     calcium carbonate 600 mg-vitamin D 400 units (CALTRATE) 600-400 MG-UNIT per tablet Take 1 tablet by mouth daily        furosemide (LASIX) 20 MG tablet Take 1 tablet (20 mg) by mouth daily 90 tablet 3     lisinopril (ZESTRIL) 10 MG tablet Take 10 mg by mouth       mesalamine (LIALDA) 1.2 g EC tablet Take 2 tablets (2,400 mg) by mouth daily 180 tablet 3     multivitamin (CENTRUM SILVER) tablet Take 1 tablet by mouth daily        nadolol (CORGARD) 20 MG tablet Take 3 tablets (60 mg) by mouth daily 270 tablet 3     potassium chloride ER (MICRO-K) 10 MEQ CR capsule Take 10 mEq by mouth At Bedtime        simvastatin (ZOCOR) 20 MG tablet Take 20 mg by mouth At Bedtime       spironolactone (ALDACTONE) 50 MG tablet Take 1 tablet (50 mg) by mouth daily 90 tablet 3     ursodiol (ACTIGALL) 300 MG capsule Take 1 capsule (300 mg) by mouth 2 times  daily 180 capsule 3       PAST SURGICAL HISTORY:  Past Surgical History:   Procedure Laterality Date     GYN SURGERY      bilat fallopian tubes and ovaries removed       ALLERGIES:     Allergies   Allergen Reactions     Diagnostic X-Ray Materials Hives     PATIENT HAD HIVE REACTION AFTER ADMINISTERING CT CONTRAST DYE.       Contrast Dye        FAMILY HISTORY:  No family history on file.      SOCIAL HISTORY:  Social History     Tobacco Use     Smoking status: Never Smoker     Smokeless tobacco: Never Used   Substance Use Topics     Alcohol use: No     Comment: Last drink was 2017     Drug use: No       ROS:   Constitutional: No fever, chills, or sweats. Weight stable.   ENT: No visual disturbance, ear ache, epistaxis, sore throat.   Cardiovascular: As per HPI.   Respiratory: No cough, hemoptysis.    GI: No nausea, vomiting, hematemesis, melena, or hematochezia.   : No hematuria.   Integument: Negative.   Psychiatric: Negative.   Hematologic:  No easy bruising, no easy bleeding.  Neuro: Negative.   Endocrinology: No significant heat or cold intolerance   Musculoskeletal: No myalgia.    Exam:  Physical Exam Elements attainable via telehealth:       Constitutional - alert and no distress    Eyes - no redness, no discharge    Respiratory - no cough, no labored breathing    Skin - no discoloration or lesions on the face or the arm.    Neurological -alert, normal speech,and affect, no tremor.     I have reviewed the labs and personally reviewed the imaging below and made my comment in the assessment and plan.    Labs:  CBC RESULTS:   Lab Results   Component Value Date    WBC 15.2 (H) 03/23/2021    RBC 3.81 03/23/2021    HGB 10.7 (L) 03/23/2021    HCT 33.6 (L) 03/23/2021    MCV 88 03/23/2021    MCH 28.1 03/23/2021    MCHC 31.8 03/23/2021    RDW 17.5 (H) 03/23/2021     (H) 03/23/2021       BMP RESULTS:  Lab Results   Component Value Date     03/23/2021    POTASSIUM 3.8 03/23/2021    CHLORIDE 105 03/23/2021     CO2 23 03/23/2021    ANIONGAP 8 03/23/2021    GLC 90 03/23/2021    BUN 14 03/23/2021    CR 0.64 03/23/2021    GFRESTIMATED >90 03/23/2021    GFRESTBLACK >90 03/23/2021    MARIELENA 9.3 03/23/2021        INR RESULTS:  Lab Results   Component Value Date    INR 1.39 (H) 03/23/2021    INR 1.22 (H) 05/13/2018       Dobutamine stress echocardiogram 4/20/2021  Dobutamine stress echocardiogram with no inducible ischemia.   Target heart rate achieved. Hypotensive response to dobutamine   No angina symptoms during stress test.   No ECG evidence of ischemia at rest or with dobutamine.   Normal segmental and global left ventricular systolic function at baseline,   LVEF 55-60%.   With peak dobutamine, LVEF increased further to >70% and LV cavity size   decreased appropriately.   No stress induced regional wall motion abnormalities.   Normal aortic root and no significant valvular dysfunction noted on screening   2D and Doppler examination.     Assessment and Plan:     #Primary sclerosing cholangitis related decompensated liver cirrhosis   #Liver transplant candidate  #Preoperative cardiovascular evaluation  -Patient reports dyspnea on exertion since the onset of ascites.  MG resolves with paracentesis.  Currently she still reports normal functional capacity.  Dobutamine stress echocardiogram is normal.  No evidence of pulmonary hypertension.  Patient can proceed to planned liver transplant from cardiac standpoint.    # Mild anemia hemoglobin 10.7    #History of hypertension  -Continue current treatment with lisinopril 10 mg    Total time spent 30 minutes precharting, face-to-face video visit and medical documentation.      Please donot hesitate to contact me if you have any questions or concerns. Again, thank you for allowing me to participate in the care of your patient.    Mason OWENS MD  [facility] Division of Cardiology  Pager [phone]         Berta is a 55 year old who is being evaluated via a billable video visit.  "     How would you like to obtain your AVS? MyChart  If the video visit is dropped, the invitation should be resent by: Send to e-mail at: [email or web address]  Will anyone else be joining your video visit? No  Vitals - Patient Reported  Weight (Patient Reported): 68 kg (150 lb)  Height (Patient Reported): 165.1 cm (5' 5\")  BMI (Based on Pt Reported Ht/Wt): 24.96  Pain Score: No Pain (0)  Pain Loc: Chest          Please do not hesitate to contact me if you have any questions/concerns.     Sincerely,     Mason Moseley MD    "

## 2021-04-27 DIAGNOSIS — Z01.812 ENCOUNTER FOR PREPROCEDURE SCREENING LABORATORY TESTING FOR COVID-19: Primary | ICD-10-CM

## 2021-04-27 DIAGNOSIS — Z11.52 ENCOUNTER FOR PREPROCEDURE SCREENING LABORATORY TESTING FOR COVID-19: Primary | ICD-10-CM

## 2021-04-27 DIAGNOSIS — Z11.59 ENCOUNTER FOR SCREENING FOR OTHER VIRAL DISEASES: ICD-10-CM

## 2021-04-27 LAB
SARS-COV-2 RNA RESP QL NAA+PROBE: NORMAL
SPECIMEN SOURCE: NORMAL

## 2021-04-27 PROCEDURE — U0005 INFEC AGEN DETEC AMPLI PROBE: HCPCS | Performed by: INTERNAL MEDICINE

## 2021-04-27 PROCEDURE — U0003 INFECTIOUS AGENT DETECTION BY NUCLEIC ACID (DNA OR RNA); SEVERE ACUTE RESPIRATORY SYNDROME CORONAVIRUS 2 (SARS-COV-2) (CORONAVIRUS DISEASE [COVID-19]), AMPLIFIED PROBE TECHNIQUE, MAKING USE OF HIGH THROUGHPUT TECHNOLOGIES AS DESCRIBED BY CMS-2020-01-R: HCPCS | Performed by: INTERNAL MEDICINE

## 2021-04-28 RX ORDER — LIDOCAINE 40 MG/G
CREAM TOPICAL
Status: CANCELLED | OUTPATIENT
Start: 2021-04-28

## 2021-04-28 RX ORDER — ALBUMIN (HUMAN) 12.5 G/50ML
12.5 SOLUTION INTRAVENOUS ONCE
Status: CANCELLED | OUTPATIENT
Start: 2021-04-28 | End: 2021-04-28

## 2021-04-28 RX ORDER — NICOTINE POLACRILEX 4 MG
15-30 LOZENGE BUCCAL
Status: CANCELLED | OUTPATIENT
Start: 2021-04-28

## 2021-04-28 RX ORDER — DEXTROSE MONOHYDRATE 25 G/50ML
25-50 INJECTION, SOLUTION INTRAVENOUS
Status: CANCELLED | OUTPATIENT
Start: 2021-04-28

## 2021-04-29 ENCOUNTER — HOSPITAL ENCOUNTER (OUTPATIENT)
Dept: ULTRASOUND IMAGING | Facility: CLINIC | Age: 56
End: 2021-04-29
Attending: INTERNAL MEDICINE
Payer: COMMERCIAL

## 2021-04-29 ENCOUNTER — APPOINTMENT (OUTPATIENT)
Dept: CT IMAGING | Facility: CLINIC | Age: 56
End: 2021-04-29
Attending: EMERGENCY MEDICINE
Payer: COMMERCIAL

## 2021-04-29 ENCOUNTER — ANESTHESIA EVENT (OUTPATIENT)
Dept: INTENSIVE CARE | Facility: CLINIC | Age: 56
DRG: 871 | End: 2021-04-29
Payer: COMMERCIAL

## 2021-04-29 ENCOUNTER — HOSPITAL ENCOUNTER (OUTPATIENT)
Facility: CLINIC | Age: 56
Discharge: STILL ADMITTED OR ADMINISTRATIVE | End: 2021-04-29
Payer: COMMERCIAL

## 2021-04-29 ENCOUNTER — APPOINTMENT (OUTPATIENT)
Dept: ULTRASOUND IMAGING | Facility: CLINIC | Age: 56
End: 2021-04-29
Attending: EMERGENCY MEDICINE
Payer: COMMERCIAL

## 2021-04-29 ENCOUNTER — APPOINTMENT (OUTPATIENT)
Dept: GENERAL RADIOLOGY | Facility: CLINIC | Age: 56
End: 2021-04-29
Attending: EMERGENCY MEDICINE
Payer: COMMERCIAL

## 2021-04-29 ENCOUNTER — HOSPITAL ENCOUNTER (INPATIENT)
Facility: CLINIC | Age: 56
LOS: 4 days | Discharge: ANOTHER HOSPITAL | DRG: 871 | End: 2021-05-03
Attending: INTERNAL MEDICINE | Admitting: INTERNAL MEDICINE
Payer: COMMERCIAL

## 2021-04-29 ENCOUNTER — HOSPITAL ENCOUNTER (EMERGENCY)
Facility: CLINIC | Age: 56
Discharge: HOME | End: 2021-04-29
Attending: EMERGENCY MEDICINE | Admitting: EMERGENCY MEDICINE
Payer: COMMERCIAL

## 2021-04-29 ENCOUNTER — ANESTHESIA (OUTPATIENT)
Dept: INTENSIVE CARE | Facility: CLINIC | Age: 56
DRG: 871 | End: 2021-04-29
Payer: COMMERCIAL

## 2021-04-29 VITALS
OXYGEN SATURATION: 100 % | SYSTOLIC BLOOD PRESSURE: 85 MMHG | HEART RATE: 63 BPM | TEMPERATURE: 96.9 F | DIASTOLIC BLOOD PRESSURE: 54 MMHG | RESPIRATION RATE: 20 BRPM

## 2021-04-29 VITALS
OXYGEN SATURATION: 80 % | TEMPERATURE: 95 F | RESPIRATION RATE: 14 BRPM | SYSTOLIC BLOOD PRESSURE: 87 MMHG | DIASTOLIC BLOOD PRESSURE: 49 MMHG | HEART RATE: 70 BPM

## 2021-04-29 DIAGNOSIS — A41.9 SEPTIC SHOCK (H): ICD-10-CM

## 2021-04-29 DIAGNOSIS — N17.9 AKI (ACUTE KIDNEY INJURY) (H): ICD-10-CM

## 2021-04-29 DIAGNOSIS — A41.9 BACTERIAL SEPSIS (H): Primary | ICD-10-CM

## 2021-04-29 DIAGNOSIS — R65.21 SEPTIC SHOCK (H): ICD-10-CM

## 2021-04-29 DIAGNOSIS — E87.1 HYPONATREMIA: ICD-10-CM

## 2021-04-29 DIAGNOSIS — K74.60 CIRRHOSIS OF LIVER WITH ASCITES, UNSPECIFIED HEPATIC CIRRHOSIS TYPE (H): ICD-10-CM

## 2021-04-29 DIAGNOSIS — K65.2 SBP (SPONTANEOUS BACTERIAL PERITONITIS) (H): ICD-10-CM

## 2021-04-29 DIAGNOSIS — R18.8 CIRRHOSIS OF LIVER WITH ASCITES, UNSPECIFIED HEPATIC CIRRHOSIS TYPE (H): ICD-10-CM

## 2021-04-29 LAB
ALBUMIN FLD-MCNC: 0.3 G/DL
ALBUMIN SERPL-MCNC: 1.5 G/DL (ref 3.4–5)
ALBUMIN UR-MCNC: 50 MG/DL
ALP SERPL-CCNC: 409 U/L (ref 40–150)
ALT SERPL W P-5'-P-CCNC: 143 U/L (ref 0–50)
ANION GAP SERPL CALCULATED.3IONS-SCNC: 10 MMOL/L (ref 3–14)
APPEARANCE FLD: NORMAL
APPEARANCE UR: ABNORMAL
AST SERPL W P-5'-P-CCNC: 244 U/L (ref 0–45)
BASOPHILS # BLD AUTO: 0.2 10E9/L (ref 0–0.2)
BASOPHILS NFR BLD AUTO: 0.4 %
BILIRUB SERPL-MCNC: 3.2 MG/DL (ref 0.2–1.3)
BILIRUB UR QL STRIP: ABNORMAL
BUN SERPL-MCNC: 76 MG/DL (ref 7–30)
CALCIUM SERPL-MCNC: 10.3 MG/DL (ref 8.5–10.1)
CHLORIDE SERPL-SCNC: 98 MMOL/L (ref 94–109)
CO2 SERPL-SCNC: 19 MMOL/L (ref 20–32)
COLOR FLD: YELLOW
COLOR UR AUTO: ABNORMAL
CREAT SERPL-MCNC: 2.28 MG/DL (ref 0.52–1.04)
DIFFERENTIAL METHOD BLD: ABNORMAL
EOSINOPHIL # BLD AUTO: 0 10E9/L (ref 0–0.7)
EOSINOPHIL NFR BLD AUTO: 0 %
ERYTHROCYTE [DISTWIDTH] IN BLOOD BY AUTOMATED COUNT: 15.3 % (ref 10–15)
FLUAV RNA RESP QL NAA+PROBE: NEGATIVE
FLUBV RNA RESP QL NAA+PROBE: NEGATIVE
GFR SERPL CREATININE-BSD FRML MDRD: 23 ML/MIN/{1.73_M2}
GLUCOSE BLDC GLUCOMTR-MCNC: 110 MG/DL (ref 70–99)
GLUCOSE BLDC GLUCOMTR-MCNC: 141 MG/DL (ref 70–99)
GLUCOSE BLDC GLUCOMTR-MCNC: 64 MG/DL (ref 70–99)
GLUCOSE BLDC GLUCOMTR-MCNC: 83 MG/DL (ref 70–99)
GLUCOSE SERPL-MCNC: 71 MG/DL (ref 70–99)
GLUCOSE UR STRIP-MCNC: NEGATIVE MG/DL
GRAM STN SPEC: NORMAL
HBA1C MFR BLD: 4.3 % (ref 0–5.6)
HCT VFR BLD AUTO: 29.4 % (ref 35–47)
HGB BLD-MCNC: 9.7 G/DL (ref 11.7–15.7)
HGB UR QL STRIP: ABNORMAL
HYALINE CASTS #/AREA URNS LPF: 69 /LPF (ref 0–2)
IMM GRANULOCYTES # BLD: 1.5 10E9/L (ref 0–0.4)
IMM GRANULOCYTES NFR BLD: 3.5 %
INR PPP: 1.51 (ref 0.86–1.14)
INTERPRETATION ECG - MUSE: NORMAL
KETONES UR STRIP-MCNC: NEGATIVE MG/DL
LABORATORY COMMENT REPORT: NORMAL
LACTATE BLD-SCNC: 1.7 MMOL/L (ref 0.7–2)
LEUKOCYTE ESTERASE UR QL STRIP: NEGATIVE
LYMPHOCYTES # BLD AUTO: 1.2 10E9/L (ref 0.8–5.3)
LYMPHOCYTES NFR BLD AUTO: 2.8 %
LYMPHOCYTES NFR FLD MANUAL: 4 %
MCH RBC QN AUTO: 29.8 PG (ref 26.5–33)
MCHC RBC AUTO-ENTMCNC: 33 G/DL (ref 31.5–36.5)
MCV RBC AUTO: 90 FL (ref 78–100)
MONOCYTES # BLD AUTO: 1.2 10E9/L (ref 0–1.3)
MONOCYTES NFR BLD AUTO: 2.7 %
MONOS+MACROS NFR FLD MANUAL: 22 %
MUCOUS THREADS #/AREA URNS LPF: PRESENT /LPF
NEUTROPHILS # BLD AUTO: 39.1 10E9/L (ref 1.6–8.3)
NEUTROPHILS NFR BLD AUTO: 90.6 %
NEUTS BAND NFR FLD MANUAL: 68 %
NITRATE UR QL: NEGATIVE
NRBC # BLD AUTO: 0 10*3/UL
NRBC BLD AUTO-RTO: 0 /100
OTHER CELLS FLD MANUAL: 6 %
PH UR STRIP: 5.5 PH (ref 5–7)
PLATELET # BLD AUTO: 427 10E9/L (ref 150–450)
PLATELET # BLD AUTO: 459 10E9/L (ref 150–450)
POTASSIUM SERPL-SCNC: 5.3 MMOL/L (ref 3.4–5.3)
PROT FLD-MCNC: 1 G/DL
PROT SERPL-MCNC: 7 G/DL (ref 6.8–8.8)
RBC # BLD AUTO: 3.26 10E12/L (ref 3.8–5.2)
RBC #/AREA URNS AUTO: 5 /HPF (ref 0–2)
RSV RNA SPEC QL NAA+PROBE: NORMAL
SARS-COV-2 RNA RESP QL NAA+PROBE: NEGATIVE
SODIUM SERPL-SCNC: 127 MMOL/L (ref 133–144)
SOURCE: ABNORMAL
SP GR UR STRIP: 1.02 (ref 1–1.03)
SPECIMEN SOURCE FLD: NORMAL
SPECIMEN SOURCE: NORMAL
SPECIMEN SOURCE: NORMAL
SQUAMOUS #/AREA URNS AUTO: 0 /HPF (ref 0–1)
UROBILINOGEN UR STRIP-MCNC: 0 MG/DL (ref 0–2)
WBC # BLD AUTO: 43.2 10E9/L (ref 4–11)
WBC # FLD AUTO: 1610 /UL
WBC #/AREA URNS AUTO: 15 /HPF (ref 0–5)

## 2021-04-29 PROCEDURE — 85049 AUTOMATED PLATELET COUNT: CPT | Performed by: RADIOLOGY

## 2021-04-29 PROCEDURE — 272N000007 HC KIT ART LINE INSERTION

## 2021-04-29 PROCEDURE — 85025 COMPLETE CBC W/AUTO DIFF WBC: CPT | Performed by: EMERGENCY MEDICINE

## 2021-04-29 PROCEDURE — 89051 BODY FLUID CELL COUNT: CPT | Performed by: EMERGENCY MEDICINE

## 2021-04-29 PROCEDURE — 250N000009 HC RX 250: Performed by: INTERNAL MEDICINE

## 2021-04-29 PROCEDURE — 87800 DETECT AGNT MULT DNA DIREC: CPT | Performed by: EMERGENCY MEDICINE

## 2021-04-29 PROCEDURE — 3E033XZ INTRODUCTION OF VASOPRESSOR INTO PERIPHERAL VEIN, PERCUTANEOUS APPROACH: ICD-10-PCS | Performed by: INTERNAL MEDICINE

## 2021-04-29 PROCEDURE — 87086 URINE CULTURE/COLONY COUNT: CPT | Performed by: EMERGENCY MEDICINE

## 2021-04-29 PROCEDURE — 83036 HEMOGLOBIN GLYCOSYLATED A1C: CPT | Performed by: INTERNAL MEDICINE

## 2021-04-29 PROCEDURE — 999N000154 HC STATISTIC RADIOLOGY XRAY, US, CT, MAR, NM

## 2021-04-29 PROCEDURE — 87186 SC STD MICRODIL/AGAR DIL: CPT | Performed by: EMERGENCY MEDICINE

## 2021-04-29 PROCEDURE — 99207 PR NO BILLABLE SERVICE THIS VISIT: CPT | Performed by: INTERNAL MEDICINE

## 2021-04-29 PROCEDURE — 76705 ECHO EXAM OF ABDOMEN: CPT

## 2021-04-29 PROCEDURE — 82042 OTHER SOURCE ALBUMIN QUAN EA: CPT | Performed by: EMERGENCY MEDICINE

## 2021-04-29 PROCEDURE — 87636 SARSCOV2 & INF A&B AMP PRB: CPT | Performed by: EMERGENCY MEDICINE

## 2021-04-29 PROCEDURE — 93005 ELECTROCARDIOGRAM TRACING: CPT

## 2021-04-29 PROCEDURE — 81001 URINALYSIS AUTO W/SCOPE: CPT | Performed by: EMERGENCY MEDICINE

## 2021-04-29 PROCEDURE — 87077 CULTURE AEROBIC IDENTIFY: CPT | Mod: 59 | Performed by: EMERGENCY MEDICINE

## 2021-04-29 PROCEDURE — 87015 SPECIMEN INFECT AGNT CONCNTJ: CPT | Performed by: EMERGENCY MEDICINE

## 2021-04-29 PROCEDURE — 96367 TX/PROPH/DG ADDL SEQ IV INF: CPT

## 2021-04-29 PROCEDURE — 87040 BLOOD CULTURE FOR BACTERIA: CPT | Performed by: EMERGENCY MEDICINE

## 2021-04-29 PROCEDURE — 36415 COLL VENOUS BLD VENIPUNCTURE: CPT

## 2021-04-29 PROCEDURE — 999N000104 HC STATISTIC NO CHARGE

## 2021-04-29 PROCEDURE — 36556 INSERT NON-TUNNEL CV CATH: CPT

## 2021-04-29 PROCEDURE — 99291 CRITICAL CARE FIRST HOUR: CPT | Mod: 25

## 2021-04-29 PROCEDURE — 258N000003 HC RX IP 258 OP 636: Performed by: INTERNAL MEDICINE

## 2021-04-29 PROCEDURE — 250N000009 HC RX 250: Performed by: EMERGENCY MEDICINE

## 2021-04-29 PROCEDURE — 84157 ASSAY OF PROTEIN OTHER: CPT | Performed by: EMERGENCY MEDICINE

## 2021-04-29 PROCEDURE — 250N000011 HC RX IP 250 OP 636: Performed by: INTERNAL MEDICINE

## 2021-04-29 PROCEDURE — C9803 HOPD COVID-19 SPEC COLLECT: HCPCS

## 2021-04-29 PROCEDURE — P9047 ALBUMIN (HUMAN), 25%, 50ML: HCPCS | Performed by: EMERGENCY MEDICINE

## 2021-04-29 PROCEDURE — 370N000003 HC ANESTHESIA WARD SERVICE

## 2021-04-29 PROCEDURE — 96365 THER/PROPH/DIAG IV INF INIT: CPT

## 2021-04-29 PROCEDURE — 200N000001 HC R&B ICU

## 2021-04-29 PROCEDURE — 99292 CRITICAL CARE ADDL 30 MIN: CPT

## 2021-04-29 PROCEDURE — 49083 ABD PARACENTESIS W/IMAGING: CPT

## 2021-04-29 PROCEDURE — 87070 CULTURE OTHR SPECIMN AEROBIC: CPT | Performed by: EMERGENCY MEDICINE

## 2021-04-29 PROCEDURE — 99223 1ST HOSP IP/OBS HIGH 75: CPT | Mod: AI | Performed by: INTERNAL MEDICINE

## 2021-04-29 PROCEDURE — 999N001017 HC STATISTIC GLUCOSE BY METER IP

## 2021-04-29 PROCEDURE — 80053 COMPREHEN METABOLIC PANEL: CPT | Performed by: EMERGENCY MEDICINE

## 2021-04-29 PROCEDURE — 87205 SMEAR GRAM STAIN: CPT | Performed by: EMERGENCY MEDICINE

## 2021-04-29 PROCEDURE — 258N000003 HC RX IP 258 OP 636: Performed by: EMERGENCY MEDICINE

## 2021-04-29 PROCEDURE — 85610 PROTHROMBIN TIME: CPT | Performed by: RADIOLOGY

## 2021-04-29 PROCEDURE — P9041 ALBUMIN (HUMAN),5%, 50ML: HCPCS | Performed by: INTERNAL MEDICINE

## 2021-04-29 PROCEDURE — 272N000706 US PARACENTESIS

## 2021-04-29 PROCEDURE — 250N000011 HC RX IP 250 OP 636: Performed by: EMERGENCY MEDICINE

## 2021-04-29 PROCEDURE — 71045 X-RAY EXAM CHEST 1 VIEW: CPT

## 2021-04-29 PROCEDURE — 74176 CT ABD & PELVIS W/O CONTRAST: CPT

## 2021-04-29 PROCEDURE — 83605 ASSAY OF LACTIC ACID: CPT | Performed by: EMERGENCY MEDICINE

## 2021-04-29 RX ORDER — ALBUMIN, HUMAN INJ 5% 5 %
25 SOLUTION INTRAVENOUS ONCE
Status: COMPLETED | OUTPATIENT
Start: 2021-04-29 | End: 2021-04-29

## 2021-04-29 RX ORDER — NICOTINE POLACRILEX 4 MG
15-30 LOZENGE BUCCAL
Status: DISCONTINUED | OUTPATIENT
Start: 2021-04-29 | End: 2021-04-29

## 2021-04-29 RX ORDER — LIDOCAINE HYDROCHLORIDE 10 MG/ML
10 INJECTION, SOLUTION EPIDURAL; INFILTRATION; INTRACAUDAL; PERINEURAL ONCE
Status: DISCONTINUED | OUTPATIENT
Start: 2021-04-29 | End: 2021-04-30 | Stop reason: HOSPADM

## 2021-04-29 RX ORDER — URSODIOL 300 MG/1
300 CAPSULE ORAL 2 TIMES DAILY
Status: DISCONTINUED | OUTPATIENT
Start: 2021-04-29 | End: 2021-04-29

## 2021-04-29 RX ORDER — DEXTROSE MONOHYDRATE 25 G/50ML
25-50 INJECTION, SOLUTION INTRAVENOUS
Status: DISCONTINUED | OUTPATIENT
Start: 2021-04-29 | End: 2021-05-03 | Stop reason: HOSPADM

## 2021-04-29 RX ORDER — DEXTROSE MONOHYDRATE 25 G/50ML
25-50 INJECTION, SOLUTION INTRAVENOUS
Status: DISCONTINUED | OUTPATIENT
Start: 2021-04-29 | End: 2021-04-29

## 2021-04-29 RX ORDER — ALBUMIN (HUMAN) 12.5 G/50ML
50 SOLUTION INTRAVENOUS ONCE
Status: COMPLETED | OUTPATIENT
Start: 2021-04-29 | End: 2021-04-29

## 2021-04-29 RX ORDER — LIDOCAINE HYDROCHLORIDE 10 MG/ML
10 INJECTION, SOLUTION EPIDURAL; INFILTRATION; INTRACAUDAL; PERINEURAL ONCE
Status: COMPLETED | OUTPATIENT
Start: 2021-04-29 | End: 2021-04-29

## 2021-04-29 RX ORDER — PIPERACILLIN SODIUM, TAZOBACTAM SODIUM 3; .375 G/15ML; G/15ML
3.38 INJECTION, POWDER, LYOPHILIZED, FOR SOLUTION INTRAVENOUS ONCE
Status: COMPLETED | OUTPATIENT
Start: 2021-04-29 | End: 2021-04-29

## 2021-04-29 RX ORDER — DEXTROSE MONOHYDRATE 25 G/50ML
25-50 INJECTION, SOLUTION INTRAVENOUS
Status: DISCONTINUED | OUTPATIENT
Start: 2021-04-29 | End: 2021-04-29 | Stop reason: HOSPADM

## 2021-04-29 RX ORDER — NICOTINE POLACRILEX 4 MG
15-30 LOZENGE BUCCAL
Status: DISCONTINUED | OUTPATIENT
Start: 2021-04-29 | End: 2021-04-29 | Stop reason: HOSPADM

## 2021-04-29 RX ORDER — ONDANSETRON 2 MG/ML
4 INJECTION INTRAMUSCULAR; INTRAVENOUS EVERY 6 HOURS PRN
Status: DISCONTINUED | OUTPATIENT
Start: 2021-04-29 | End: 2021-05-03 | Stop reason: HOSPADM

## 2021-04-29 RX ORDER — LIDOCAINE 40 MG/G
CREAM TOPICAL
Status: DISCONTINUED | OUTPATIENT
Start: 2021-04-29 | End: 2021-04-29 | Stop reason: HOSPADM

## 2021-04-29 RX ORDER — NOREPINEPHRINE BITARTRATE 0.06 MG/ML
.03-.4 INJECTION, SOLUTION INTRAVENOUS CONTINUOUS
Status: DISCONTINUED | OUTPATIENT
Start: 2021-04-29 | End: 2021-05-02

## 2021-04-29 RX ORDER — NICOTINE POLACRILEX 4 MG
15-30 LOZENGE BUCCAL
Status: DISCONTINUED | OUTPATIENT
Start: 2021-04-29 | End: 2021-05-03 | Stop reason: HOSPADM

## 2021-04-29 RX ORDER — NOREPINEPHRINE BITARTRATE 0.06 MG/ML
0.03-0.4 INJECTION, SOLUTION INTRAVENOUS CONTINUOUS
Status: DISCONTINUED | OUTPATIENT
Start: 2021-04-29 | End: 2021-04-29 | Stop reason: HOSPADM

## 2021-04-29 RX ORDER — ONDANSETRON 4 MG/1
4 TABLET, ORALLY DISINTEGRATING ORAL EVERY 6 HOURS PRN
Status: DISCONTINUED | OUTPATIENT
Start: 2021-04-29 | End: 2021-05-03 | Stop reason: HOSPADM

## 2021-04-29 RX ORDER — SODIUM CHLORIDE 9 MG/ML
INJECTION, SOLUTION INTRAVENOUS ONCE
Status: COMPLETED | OUTPATIENT
Start: 2021-04-29 | End: 2021-04-29

## 2021-04-29 RX ORDER — ALBUMIN (HUMAN) 12.5 G/50ML
SOLUTION INTRAVENOUS
Status: DISCONTINUED
Start: 2021-04-29 | End: 2021-04-29 | Stop reason: HOSPADM

## 2021-04-29 RX ORDER — SIMVASTATIN 20 MG
20 TABLET ORAL AT BEDTIME
Status: DISCONTINUED | OUTPATIENT
Start: 2021-04-29 | End: 2021-04-29

## 2021-04-29 RX ADMIN — PIPERACILLIN SODIUM AND TAZOBACTAM SODIUM 3.38 G: 3; .375 INJECTION, POWDER, LYOPHILIZED, FOR SOLUTION INTRAVENOUS at 11:24

## 2021-04-29 RX ADMIN — SODIUM CHLORIDE 500 ML: 9 INJECTION, SOLUTION INTRAVENOUS at 14:48

## 2021-04-29 RX ADMIN — SODIUM CHLORIDE 500 ML: 9 INJECTION, SOLUTION INTRAVENOUS at 09:52

## 2021-04-29 RX ADMIN — HYDROCORTISONE SODIUM SUCCINATE 100 MG: 100 INJECTION, POWDER, FOR SOLUTION INTRAMUSCULAR; INTRAVENOUS at 21:49

## 2021-04-29 RX ADMIN — LIDOCAINE HYDROCHLORIDE 10 ML: 10 INJECTION, SOLUTION EPIDURAL; INFILTRATION; INTRACAUDAL; PERINEURAL at 10:24

## 2021-04-29 RX ADMIN — TAZOBACTAM SODIUM AND PIPERACILLIN SODIUM 3.38 G: 375; 3 INJECTION, SOLUTION INTRAVENOUS at 20:10

## 2021-04-29 RX ADMIN — Medication 0.4 MCG/KG/MIN: at 21:36

## 2021-04-29 RX ADMIN — DEXTROSE AND SODIUM CHLORIDE: 5; 900 INJECTION, SOLUTION INTRAVENOUS at 20:11

## 2021-04-29 RX ADMIN — SODIUM CHLORIDE: 9 INJECTION, SOLUTION INTRAVENOUS at 12:05

## 2021-04-29 RX ADMIN — ALBUMIN HUMAN 25 G: 0.05 INJECTION, SOLUTION INTRAVENOUS at 21:57

## 2021-04-29 RX ADMIN — Medication 0.03 MCG/KG/MIN: at 11:43

## 2021-04-29 RX ADMIN — ALBUMIN HUMAN 50 G: 0.25 SOLUTION INTRAVENOUS at 10:36

## 2021-04-29 RX ADMIN — VASOPRESSIN 2.4 UNITS/HR: 20 INJECTION INTRAVENOUS at 20:11

## 2021-04-29 ASSESSMENT — ACTIVITIES OF DAILY LIVING (ADL): ADLS_ACUITY_SCORE: 15

## 2021-04-29 ASSESSMENT — ENCOUNTER SYMPTOMS
COUGH: 0
VOMITING: 0
SHORTNESS OF BREATH: 0
FEVER: 1
FATIGUE: 1
ABDOMINAL DISTENTION: 1
HEADACHES: 0
NECK STIFFNESS: 0

## 2021-04-29 NOTE — DISCHARGE INSTRUCTIONS

## 2021-04-29 NOTE — ED NOTES
[facility]  ED Nurse Handoff Report    ED Chief complaint: Hypotension (liver failure)      ED Diagnosis:   Final diagnoses:   None       Code Status:to be discussed with hospitalist     Allergies:   Allergies   Allergen Reactions     Diagnostic X-Ray Materials Hives     PATIENT HAD HIVE REACTION AFTER ADMINISTERING CT CONTRAST DYE.       Contrast Dye        Patient Story: pt was here for outpatient paracentesis, noted to have lethargy and low bp ( 60/20 was reported ) pt brought to ED for stabilization, procedure carried out in stabe 3.    Focused Assessment:  Alert calm orientated     Treatments and/or interventions provided: iv fluid 500cc , albumin, monitoring, paracentesis, admission .    Patient's response to treatments and/or interventions: improvement     To be done/followed up on inpatient unit:  monitor     Does this patient have any cognitive concerns?: none     Activity level - Baseline/Home:  Independent  Activity Level - Current:   Stand with Assist    Patient's Preferred language: English   Needed?: No    Isolation: special rule out covid precautions   Infection:   COVID r/o and special precautions  Patient tested for COVID 19 prior to admission: YES  Bariatric?: No    Vital Signs:   Vitals:    04/29/21 1035 04/29/21 1040 04/29/21 1045 04/29/21 1050   BP: (!) 72/42 (!) 71/39 (!) 60/39 (!) 76/44   Pulse: 60 63 65 67   Resp: 23 26 13 25   SpO2: 99% 97% 97% 97%       Cardiac Rhythm:     Was the PSS-3 completed:   Yes  What interventions are required if any?               Family Comments: na   Pt has children   OBS brochure/video discussed/provided to patient/family: N/A              Name of person given brochure if not patient: na              Relationship to patient: na    For the majority of the shift this patient's behavior was Green.   Behavioral interventions performed were none needed .    ED NURSE PHONE NUMBER: [phone]

## 2021-04-29 NOTE — ED NOTES
Pt was here for outpatient paracentesis- on the 13th they removed 5liters of fluid.  for the para noted pt had hypotention bp reportedly 60/20 at one point . Sleepy lethargic. Iv has been placed fluids infuising  bp 80's sbp at the time of this note. Pt origionally was arrived from outpatient status so ED admission was delayed as per charting.

## 2021-04-29 NOTE — PROGRESS NOTES
Care Suites Admission Nursing Note    Patient Information  Name: Berta Nava  Age: 55 year old  Reason for admission: paracentesis  Care Suites arrival time: 0800    Visitor Information  Name: none  Informed of visitor restrictions: N/A  1 visitor allowed per patient   Visitor must screen negative for COVID symptoms   Visitor must wear a mask  Waiting rooms closed to visitors    Patient Admission/Assessment   Pre-procedure assessment complete: Yes  If abnormal assessment/labs, provider notified: N/A  NPO: N/A  Medications held per instructions/orders: N/A  Consent: deferred  If applicable, pregnancy test status: deferred  Patient oriented to room: Yes  Education/questions answered: Yes  Plan/other:     Discharge Planning  Discharge name/phone number: sheri Benz [phone]  Overnight post sedation caregiver: Demar  Discharge location: home    Sanjana Shabazz RN

## 2021-04-29 NOTE — PROGRESS NOTES
PATIENT/VISITOR WELLNESS SCREENING    Step 1 Patient Screening    1. In the last month, have you been in contact with someone who was confirmed or suspected to have Coronavirus/COVID-19? No    2. Do you have the following symptoms?  Fever/Chills? No   Cough? No   Shortness of breath? No   New loss of taste or smell? No  Sore throat? No  Muscle or body aches? No  Headaches? No  Fatigue? No  Vomiting or diarrhea? No    Step 2 Visitor Screening    1. Name of Visitor (1 visitor per patient): none    2. In the last month, have you been in contact with someone who was confirmed or suspected to have Coronavirus/COVID-19? No    3. Do you have the following symptoms?  Fever/Chills? No   Cough? No   Shortness of breath? No   Skin rash? No   Loss of taste or smell? No  Sore throat? No  Runny or stuffy nose? No  Muscle or body aches? No  Headaches? No  Fatigue? No  Vomiting or diarrhea? No    If the visitor has positive symptoms, notify supervisor/manger  Per policy, the visitor will need to leave the facility     Step 3 Refer to logic grid below for actions    NO SYMPTOM(S)    ACTIONS:  1. Standard rooming process  2. Provider to assess per normal protocol  3. Implement precautions as needed and per guidelines     POSITIVE SYMPTOM(S)  If positive for ANY of the following symptoms: fever, cough, shortness of breath, rash    ACTION:  1. Continue to have the patient wear a mask   2. Room patient as soon as possible  3. Don appropriate PPE when entering room  4. Provider evaluation

## 2021-04-29 NOTE — PROCEDURES
[facility]    Procedure: US paracentesis    Date/Time: 4/29/2021 11:06 AM  Performed by: Scot Barrientos MD  Authorized by: Scot Barrientos MD     UNIVERSAL PROTOCOL   Site Marked: NA  Prior Images Obtained and Reviewed:  Yes  Required items: Required blood products, implants, devices and special equipment available    Patient identity confirmed:  Verbally with patient, arm band, provided demographic data and hospital-assigned identification number  Patient was reevaluated immediately before administering moderate or deep sedation or anesthesia  Confirmation Checklist:  Patient's identity using two indicators, relevant allergies, procedure was appropriate and matched the consent or emergent situation and correct equipment/implants were available  Time out: Immediately prior to the procedure a time out was called    Universal Protocol: the Joint Commission Universal Protocol was followed    Preparation: Patient was prepped and draped in usual sterile fashion    ESBL (mL):  2         ANESTHESIA    Anesthesia: Local infiltration  Local Anesthetic:  Lidocaine 1% without epinephrine      SEDATION    Patient Sedated: No    See dictated procedure note for full details.  Findings: Patient presented to US as an outpatient.  BP found to 70/30 and 60/20 prior to an intervention.  Patient coherent, though, lethargic.  Brought patient to ED for workup, and perfored Paracentesis in ED.  3.5 L of yellow fluid removed.  TOlerated well.  Sample sent to lab for evaluation.  WBC significantly elevated to 43, therefore, SBP in differential diagnosis.    Specimens: fluid and/or tissue for gram stain and culture and fluid and/or tissue for laboratory analysis    Complications: None    Condition: Stable    PROCEDURE   Patient Tolerance:  Patient tolerated the procedure well with no immediate complications    Length of time physician/provider present for 1:1 monitoring during sedation: 0

## 2021-04-29 NOTE — H&P
[facility]    Hospitalist History and Physical    Name: Berta Nava    MRN: [id number]  YOB: 1965    Age: 55 year old  Date of Admission:  4/29/2021  Date of Service (when I saw the patient): 04/29/21    Assessment & Plan   Berta Nava is a 55 year old female with past medical history significant for ulcerative colitis and primary sclerosing cholangitis with cirrhosis complicated with esophageal varices and ascites( on liver transplant list)., hypertension, hyperlipidemia, Sjogren's syndrome presented to [facility] emergency room with low blood pressure.  She was seen for her usual paracentesis where she was found to be hypotensive with systolic blood pressure in 60s and was sent to the emergency room.  She gives history of not feeling well fever for the last few nights, lethargy and increased BMs.  Evaluation in the emergency room showed leukocytosis of 43K and paracentesis was performed in the emergency room with 3.5 L of fluid removed.  Fluid positive for 1100 WBCs.  She required norepinephrine in the emergency room to keep her maps above 60.  Patient admitted for septic shock to [facility], acute on chronic hepatic failure, renal failure.    Septic shock secondary to spontaneous bacterial peritonitis  -Likely from SBP  -Ascitic fluid analysis suggestive of SBP, Gram stain is negative cultures are pending  -Blood culture sent  -Started on Zosyn  -Requiring norepinephrine for maintaining blood pressure  -Admitted to ICU.  Care plan discussed with telemetry intensivist  -Holding prior to admission diuretics Lasix spironolactone and antihypertensives nadolol and lisinopril      Acute on chronic hepatic failure  Primary sclerosing cholangitis  -Patient with known history of primary sclerosing cholangitis, ascites complicated with esophageal varices and ascites requiring intermittent paracentesis  Patient has been evaluated for liver transplant follows at [facility]  [place]-.  Recent work-up for transplant was encouraging the patient  -Febrile for the last few days, abdominal distention and tenderness initially on presentation  -CT abdomen pelvis shows biliary stent in place  -Recheck LFTs in a.m.  -Antibiotics for SBP as above  Consult gastroenterology-  -holding prior to admission spironolactone, Lasix, nadolol  -  Acute renal failure  -Secondary to sepsis and cannot exclude hepatorenal  -Careful hydration will order D5 NS  -Antibiotics  -Nephrology consulted  -We will check urine analysis    Hypoglycemia  -Secondary to poor oral intake and liver failure and sepsis  -Add D5 in IV fluids    History of hypertension  -Prior to admission on lisinopril and nadolol for also liver failure  -Patient is hypotensive with sepsis holding both lisinopril and nadolol    History of ulcerative colitis  -On mesalamine prior to admission.  Holding it for now  GI consulted    History of hyperlipidemia  -Holding statins with worsening liver failure    Care plan discussed with telemetry intensivist and nursing team      DVT Prophylaxis: Pneumatic Compression Devices  Code Status: Full Code    Disposition: Admitted to ICU critically ill      Primary Care Physician   [facility]    Chief Complaint   Low blood pressure septic shock    History is obtained from the patient    History of Present Illness   Berta Nava is a 55 year old female with past medical history significant for primary sclerosing cholangitis with liver failure cirrhosis associated ascites and esophageal varices, on list for liver transplant, ulcerative colitis, hypertension, hyperlipidemia, Sjogren's syndrome was seen at [facility] emergency room for low blood pressure.      patient requires intermittent paracentesis, she was at hospital for paracentesis where she was found to be hypotensive with systolic blood pressure in 60s.  She was then seen in the emergency room where she was hypotensive  tachycardic.  Evaluation showed significant leukocytosis distended abdomen underwent paracentesis with 3.5 L removed.  Evaluation showed concern for SBP.  In the emergency room patient was hypotensive required vasopressors for blood pressure support and underwent central line placement for vasopressors.  Patient is transferred to [facility] as no beds were available at [facility].    She gives history of not feeling well for the last few days.  She noted temperature of [id number] at nights.  And using Tylenol for fevers.  She also noted increased BMs.  Denies any abdominal pain no nausea no chest pain no shortness of breath has had some coughing with her paracentesis other than that no productive cough chest pain palpitations.  She complains of lethargy and weakness.  No dysuria.  Review of all the other symptoms are negative.    Past Medical History    Past Medical History:   Diagnosis Date     Ascites      Biliary cirrhosis (H)      Cholangitis, sclerosing      Cirrhosis of liver with ascites (H) 3/3/2021     Hearing loss of left ear     wears a hearing aide     History of low potassium      Hyperlipidemia      Hypertension      Sjogren's syndrome (H)      Ulcerative colitis (H)      Ulcerative pancolitis (H)          Past Surgical History   Past Surgical History:   Procedure Laterality Date     GYN SURGERY      bilat fallopian tubes and ovaries removed       Prior to Admission Medications   Prior to Admission Medications   Prescriptions Last Dose Informant Patient Reported? Taking?   calcium carbonate 600 mg-vitamin D 400 units (CALTRATE) 600-400 MG-UNIT per tablet   Yes No   Sig: Take 1 tablet by mouth daily    furosemide (LASIX) 20 MG tablet   No No   Sig: Take 1 tablet (20 mg) by mouth daily   lisinopril (ZESTRIL) 10 MG tablet   Yes No   Sig: Take 10 mg by mouth   mesalamine (LIALDA) 1.2 g EC tablet   No No   Sig: Take 2 tablets (2,400 mg) by mouth daily   multivitamin (CENTRUM SILVER) tablet   Yes  No   Sig: Take 1 tablet by mouth daily    nadolol (CORGARD) 20 MG tablet   No No   Sig: Take 3 tablets (60 mg) by mouth daily   potassium chloride ER (MICRO-K) 10 MEQ CR capsule   Yes No   Sig: Take 10 mEq by mouth At Bedtime    simvastatin (ZOCOR) 20 MG tablet   Yes No   Sig: Take 20 mg by mouth At Bedtime   spironolactone (ALDACTONE) 50 MG tablet   No No   Sig: Take 1 tablet (50 mg) by mouth daily   ursodiol (ACTIGALL) 300 MG capsule   No No   Sig: Take 1 capsule (300 mg) by mouth 2 times daily      Facility-Administered Medications: None     Allergies   Allergies   Allergen Reactions     Diagnostic X[employer] Hives     PATIENT HAD HIVE REACTION AFTER ADMINISTERING CT CONTRAST DYE.       Contrast Dye        Social History   Social History     Tobacco Use     Smoking status: Never Smoker     Smokeless tobacco: Never Used   Substance Use Topics     Alcohol use: No     Comment: Last drink was 2017     Social History     Social History Narrative     Not on file     Lives with her significant other.  Denies any use of alcohol or smoking    Family History   I have reviewed this patient's family history and updated it with pertinent information if needed.   No family history on file.  Family history significant for hypertension.    Review of Systems   A Comprehensive greater than 10 system review of systems was carried out.  Pertinent positives and negatives are noted above.  Otherwise negative for contributory information.    Physical Exam                      Vital Signs with Ranges  Temp:  [95  F (35  C)-96.9  F (36.1  C)] 96.9  F (36.1  C)  Pulse:  [53-75] 63  Resp:  [13-39] 20  BP: ()/(21-76) 85/54  SpO2:  [80 %-100 %] 100 %  0 lbs 0 oz    GEN:  Alert, oriented x 3, jaundiced, thin and frail  HEENT:  Normocephalic/atraumatic, notable scleral icterus, no nasal discharge, mouth dry  CV:  Regular rate and rhythm, no murmur or JVD.  S1 + S2 noted, no S3 or S4.  LUNGS: Few bibasilar crackles otherwise clear to  auscultation bilaterally without rales/rhonchi/wheezing/retractions.  Symmetric chest rise on inhalation noted.  ABD:  Active bowel sounds, soft, no significant tenderness noted on exam minimal right lower quadrant tenderness.  No rebound/guarding/rigidity.  EXT: Trace edema.  No cyanosis.  No joint synovitis noted.  SKIN:  Dry to touch, jaundiced, no exanthems noted in the visualized areas.  NEURO:  Symmetric muscle strength,   No new focal deficits appreciated.    Data   Data reviewed today:  I personally reviewed the EKG tracing showing Normal sinus rhythm with T-wave inverted lead III.    No results for input(s): PH, PHV, PO2, PO2V, SAT, PCO2, PCO2V, HCO3, HCO3V in the last 168 hours.  Recent Labs   Lab 04/29/21  0942 04/29/21  0815   WBC 43.2*  --    HGB 9.7*  --    HCT 29.4*  --    MCV 90  --     459*     Recent Labs   Lab 04/29/21  0942   *   POTASSIUM 5.3   CHLORIDE 98   CO2 19*   ANIONGAP 10   GLC 71   BUN 76*   CR 2.28*   GFRESTIMATED 23*   GFRESTBLACK 27*   MARIELENA 10.3*     Recent Labs   Lab 04/29/21  1106 04/29/21  0942   CULT PENDING  No growth after 2 hours PENDING     Recent Labs   Lab 04/29/21  0942   *   *   ALKPHOS 409*   BILITOTAL 3.2*     Recent Labs   Lab 04/29/21  0815   INR 1.51*     Recent Labs   Lab 04/29/21  0942   LACT 1.7     No results for input(s): LIPASE in the last 168 hours.  Recent Labs   Lab 04/29/21  0942   BUN 76*   CR 2.28*     No results for input(s): TSH in the last 168 hours.  No results for input(s): TROPONIN, TROPI, TROPR in the last 168 hours.    Invalid input(s): TROP, TROPONINIES  Recent Labs   Lab 04/29/21  1106   COLOR Dark Yellow   APPEARANCE Slightly Cloudy   URINEGLC Negative   URINEBILI Small*   URINEKETONE Negative   SG 1.021   UBLD Trace*   URINEPH 5.5   PROTEIN 50*   NITRITE Negative   LEUKEST Negative   RBCU 5*   WBCU 15*       Recent Results (from the past 24 hour(s))   US Abdomen Limited    Narrative    US ABDOMEN LIMITED   4/29/2021  9:31 AM    HISTORY:  55-year-old woman with recurrent ascites, request made for  paracentesis.    TECHNIQUE: Patient was brought to the ultrasound department and  informed consent obtained. Prior to beginning procedure, blood  pressure was known to be low, initially  approximately 70/35. This  subsequently was 77/45, though final measurement was 60/20. Patient is  coherent, though lethargic. Given this finding, plan is to bring the  patient to the ER for evaluation.      Impression    IMPRESSION: Patient is hypotensive upon arrival. Lethargic. To be  taken to the ER for evaluation.     NICK WU MD   XR Chest Port 1 View    Narrative    CHEST ONE VIEW April 29, 2021 10:00 AM     HISTORY: Fever.    COMPARISON: None.      Impression    IMPRESSION: No acute disease.    KARIN GALLARDO MD   US Paracentesis    Narrative    US PARACENTESIS  4/29/2021 10:51 AM       HISTORY: High volume paracentesis with or without diagnostic fluid  analysis with labs to be drawn if ordered. Total paracentesis volume  as much as possible.    PROCEDURE:  Written informed consent was obtained from the patient  prior to the procedure. The risks and benefits including bleeding,  infection and abdominal organ injury were discussed and the patient  wished to continue. Initial ultrasound images demonstrated a large  right lower quadrant fluid collection. A permanent image was saved.  The skin overlying this collection was marked, prepped, draped and  anesthetized in usual sterile fashion utilizing 10 mL of lidocaine 1%.  The paracentesis catheter was then placed into the peritoneal fluid  collection with return of 3500 mL of yellow fluid. Estimated blood  loss during the procedure was less than 5 mL. No specimens collected.  Patient tolerated the procedure well. The patient will receive  intravenous albumin on the usual sliding scale as needed per protocol.       With continuous ultrasound guidance, 8 French needle and catheter  advanced into  the ascites and ultrasound image was stored for  documentation.      Impression    IMPRESSION:  Ultrasound-guided paracentesis.  3.5 L removed. Sample  sent to the lab for evaluation.      NICK WU MD   CT Abdomen Pelvis w/o Contrast    Narrative    CT ABDOMEN PELVIS WITHOUT CONTRAST 4/29/2021 3:29 PM    CLINICAL HISTORY: Hypotension, septic shock.   TECHNIQUE: CT scan of the abdomen and pelvis was performed without IV  contrast. Multiplanar reformats were obtained. Dose reduction  techniques were used.  CONTRAST: None.    COMPARISON: MRI from February 4, 2021    FINDINGS:   LOWER CHEST: Minimal bibasilar atelectasis vs. less likely infiltrate  right worse than left. Trace pleural fluid.    HEPATOBILIARY: Cirrhotic-appearing liver morphology. Distended  gallbladder similar to previous. No calcified gallstones. Biliary  stent noted.    PANCREAS: Grossly unremarkable.    SPLEEN: Splenomegaly at 14.5 cm. No focal lesions.    ADRENAL GLANDS: Somewhat thickened bilaterally. No discrete nodules.    KIDNEYS/BLADDER: No significant mass, stones, or hydronephrosis.    BOWEL: No obstruction or inflammatory change.    LYMPH NODES: No gross lymphadenopathy in the absence of contrast.    VASCULATURE: No abdominal aortic aneurysm.    PELVIC ORGANS: No pelvic masses.    OTHER: Small to moderate amount of ascites. No free air.    MUSCULOSKELETAL: No suspicious bony lesions.      Impression    IMPRESSION:   1.  Small to moderate amount of ascites.  2.  Cirrhotic liver morphology and distended gallbladder similar to  previous. Biliary stent appears to extend from the left lobe to the  duodenum.  3.  No hydronephrosis or bowel obstruction demonstrated.    KARIN GALLARDO MD

## 2021-04-29 NOTE — ED NOTES
Spoke to patient's  on the phone to update with number and transfer. I also called the Rn at [facility] to et her know to call the  once pt is settled at their hospital.

## 2021-04-29 NOTE — PLAN OF CARE
ICU End of Shift Summary.  For vital signs and complete assessments, please see documentation flowsheets.     Pertinent assessments: Patient a&ox4, No c/o pain, tele SR, On RA sats in high 90s. Abdomen distended, ascites. One small incontinent void. No bm  Major Shift Events: arrived from [facility] ER after having paracentesis on LRQ, dressing d,c,I. BP's low, on Levophed at 0.4mcg/kg/min. Maps ranging from high 50s to mostly high 60s into low 70s. Phoned  when patient arrived to floor.  Plan (Upcoming Events): stabilize BP  Discharge/Transfer Needs: tbd    Bedside Shift Report Completed : y  Bedside Safety Check Completed: y

## 2021-04-29 NOTE — PROGRESS NOTES
Care Suites Procedure Nursing Note    Patient Information  Name: Berta Nava  Age: 55 year old    Procedure  Procedure:paracentesis  Procedure start time: 0905  Procedure complete time:   Concerns/abnormal assessment: Low BP 70/42 Dr. Chairez notified  If abnormal assessment, provider notified: Yes  Plan/Other: pt seen by Dr. Barrientos and it was decided to admit pt to ER for further evaluation. Rn tried to get a hold of Caleb Lazar RN at the  ( he is her contact for the transplant team) but unable to get a hold of him, pt returned to room before transfer to ER,Caleb Lazar -[phone]    Sanjana Shabazz RN

## 2021-04-29 NOTE — ED PROVIDER NOTES
History   Chief Complaint:  Hypotension    HPI   Berta Nava is a 55 year old female with history of PSC with ascites and is currently being evaluated for liver transplant at [facility] who presents after low blood pressure readings in clinic today. The patient arrives from the floor of the hospital after she was found to have a low blood pressure prior to her scheduled outpatient paracentesis. The nurse from the floor states that the patient has a history of liver failure that requires regular paracentesis. She was last seen on 04/13 and had over 5 L of fluid removed. At that time, she tolerated the procedure well and had a blood pressure as low as 80s systolic. Today she was noted to have a low blood pressure as low as the 60s systolic and was moved to the emergency department for further evaluation before her paracentesis. In the emergency department, the patient states that she has been having fevers at night as high 101 or 102 for the past few days. She also mentions feeling lethargic as well as having increased stooling. She denies any vomiting, coughing, chest pain, shortness of breath, headache, neck stiffness or sick contacts. She also denies any history of SBP.     [facility] 04/29/2021  INR: 1.51 (H)  Platelet Count: 459 (H)    Review of Systems   Constitutional: Positive for fatigue and fever.   Respiratory: Negative for cough and shortness of breath.    Cardiovascular: Negative for chest pain.   Gastrointestinal: Positive for abdominal distention. Negative for vomiting.   Musculoskeletal: Negative for neck stiffness.   Neurological: Negative for headaches.   All other systems reviewed and are negative.      Allergies:  Diagnostic X-Ray Materials  Contrast Dye    Medications:  Caltrate  Lasix  Lisinopril  Lialda  Corgard  Micro-K  Zocor  Aldactone  Actigall    Past Medical History:    Ascites  Biliary cirrhosis  Primary sclerosing cholangitis  Hearing loss in left ear  Low  potassium  Hyperlipidemia  Hypertension  Sjogren's syndrome  Ulcerative colitis  Chronic fatigue  Sicca syndrome  Inflammatory bowel disease     Past Surgical History:    Fallopian tube and ovary removal    Social History:  The patient presents alone.     Physical Exam     Patient Vitals for the past 24 hrs:   BP Temp Temp src Pulse Resp SpO2   04/29/21 1615 91/54 -- -- 61 20 100 %   04/29/21 1600 98/45 -- -- 64 20 100 %   04/29/21 1545 (!) 105/38 -- -- 66 24 100 %   04/29/21 1530 94/60 -- -- 67 -- --   04/29/21 1500 (!) 101/37 -- -- 71 27 98 %   04/29/21 1445 102/66 -- -- 63 23 99 %   04/29/21 1430 96/61 -- -- 67 14 98 %   04/29/21 1415 97/41 -- -- 66 20 98 %   04/29/21 1400 112/46 -- -- 66 18 98 %   04/29/21 1345 (!) 82/45 -- -- 61 14 99 %   04/29/21 1330 (!) 78/37 -- -- 62 17 99 %   04/29/21 1315 (!) 76/34 -- -- 63 19 --   04/29/21 1310 (!) 74/44 -- -- 61 18 99 %   04/29/21 1305 (!) 75/46 -- -- 60 19 98 %   04/29/21 1300 (!) 77/47 -- -- 61 20 97 %   04/29/21 1255 (!) 77/45 -- -- 62 20 98 %   04/29/21 1250 (!) 79/44 -- -- 61 19 98 %   04/29/21 1245 (!) 82/41 -- -- 64 19 98 %   04/29/21 1230 (!) 81/56 -- -- 71 13 96 %   04/29/21 1225 (!) 80/46 -- -- 61 21 98 %   04/29/21 1220 (!) 72/41 -- -- 68 20 97 %   04/29/21 1215 (!) 73/44 -- -- 67 22 97 %   04/29/21 1210 (!) 87/41 -- -- 70 23 98 %   04/29/21 1209 -- -- -- -- 18 --   04/29/21 1205 110/62 -- -- 67 (!) 39 99 %   04/29/21 1200 (!) 70/42 -- -- 61 23 97 %   04/29/21 1155 (!) 62/37 -- -- 62 20 97 %   04/29/21 1154 (!) 67/40 96.9  F (36.1  C) Temporal 60 18 --   04/29/21 1150 (!) 67/40 -- -- 61 21 96 %   04/29/21 1145 (!) 72/39 -- -- 61 20 98 %   04/29/21 1140 (!) 65/43 -- -- 60 23 97 %   04/29/21 1135 (!) 61/36 -- -- 63 21 99 %   04/29/21 1130 (!) 66/45 -- -- 63 13 99 %   04/29/21 1125 (!) 69/40 -- -- 61 20 97 %   04/29/21 1120 (!) 74/50 -- -- 64 18 96 %   04/29/21 1115 (!) 88/76 -- -- 67 20 96 %   04/29/21 1110 (!) 70/40 -- -- 70 19 97 %   04/29/21 1105 (!) 70/40  -- -- 63 19 98 %   04/29/21 1100 (!) 69/41 -- -- 68 23 96 %   04/29/21 1055 (!) 68/37 -- -- 60 21 97 %   04/29/21 1050 (!) 76/44 -- -- 67 25 97 %   04/29/21 1045 (!) 60/39 -- -- 65 13 97 %   04/29/21 1040 (!) 71/39 -- -- 63 26 97 %   04/29/21 1035 (!) 72/42 -- -- 60 23 99 %   04/29/21 1030 (!) 80/43 -- -- 61 20 97 %   04/29/21 1025 (!) 75/48 -- -- 63 25 98 %   04/29/21 1020 (!) 79/50 -- -- 66 25 96 %   04/29/21 1015 (!) 71/49 -- -- 63 22 97 %   04/29/21 1010 (!) 87/47 -- -- 71 27 100 %   04/29/21 1005 (!) 84/50 -- -- 66 26 100 %   04/29/21 1000 (!) 80/53 -- -- 75 18 100 %   04/29/21 0955 (!) 83/53 -- -- 71 22 100 %   04/29/21 0950 (!) 84/50 -- -- 68 16 100 %   04/29/21 0945 (!) 79/57 -- -- 70 23 (!) 89 %       Physical Exam  General: Alert and cooperative with exam. Patient in mild distress. Normal mentation.  Head:  Scalp is NC/AT  Eyes:  Scleral icterus, PERRL  ENT:  The external nose and ears are normal. The oropharynx is normal and without erythema; mucus membranes are moist.   Neck:  Normal range of motion without rigidity.  CV:  Regular rate and rhythm  Resp:  Breath sounds are clear bilaterally    Non-labored, no retractions or accessory muscle use  GI:  Abdomen is soft, mild distention with fluid wave, no tenderness. No peritoneal signs  MS:  No lower extremity edema   Skin:  Warm and dry, No rash or lesions noted. Jaundiced appearance   Neuro: Oriented x 3. No gross motor deficits.    Emergency Department Course   ECG  ECG taken at 0937, ECG read at 0955  Normal sinus rhythm   Poor data quality, interpretation may be adversely affected  No significant changes as compared to prior, dated 05/13/18.  Rate 68 bpm. KY interval 166 ms. QRS duration 90 ms. QT/QTc 396/421 ms. P-R-T axes 15 43 15.     Imaging:  XR Chest Port 1 View  No acute disease.  Reading per radiology.    US Paracentesis  Ultrasound-guided paracentesis.  3.5 L removed. Sample  sent to the lab for evaluation.      CT Abdomen Pelvis WO  Contrast  1.  Small to moderate amount of ascites.  2.  Cirrhotic liver morphology and distended gallbladder similar to  previous. Biliary stent appears to extend from the left lobe to the  duodenum.  3.  No hydronephrosis or bowel obstruction demonstrated.  Reading per radiology.    Laboratory:  CBC: WBC 43.2 (H), HGB 9.7 (L),    CMP: Sodium 127 (L), Carbon Dioxide 19 (L), Urea Nitrogen 76 (H), Creatinine 2.28 (H), GFR 23 (L), Calcium 10.3 (H), Bilirubin Total 3.2 (H), Albumin 1.5 (L), Alkaline Phosphatase 409 (H),  (H),  (H) o/w WNL     Lactic acid (result time 1001) 1.7   UA with microscopic: Bilirubin small, Blood trace, Protein Albumin 50, WBC 15 (H), RBC 5 (H), Mucous present, Hyaline Casts 69 (H) o/w WNL     Symptomatic Influenza A/B & SARS-CoV2 (COVID19) Virus PCR Multiplex: Negative     Blood Culture x2: Pending  Urine Culture: Pending     Gram Stain: No organisms seen  Protein Fluid: 1.0  Albumin Fluid: 0.3  Albumin Level: Pending  Cell Count with Differential Fluid: % Neutrophils 68, % Lymphocytes 54, % Mono/Macro 22, % Other Cells 6, Fluid Color yellow, Appearance slightly cloudy, WBC Fluid 1610  Fluid Culture Aerobic Bacterial: Pending     [facility]    -Central Line    Date/Time: 4/29/2021 12:07 PM  Performed by: Devon Chu DO  Authorized by: Devon Chu DO     UNIVERSAL PROTOCOL   Site Marked: Yes  Prior Images Obtained and Reviewed:  NA  Required items: Required blood products, implants, devices and special equipment available    Patient identity confirmed:  Verbally with patient and arm band  Patient was reevaluated immediately before administering moderate or deep sedation or anesthesia  Confirmation Checklist:  Patient's identity using two indicators, relevant allergies, procedure was appropriate and matched the consent or emergent situation and correct equipment/implants were available  Time out: Immediately prior  to the procedure a time out was called    Universal Protocol: the Joint Commission Universal Protocol was followed    Preparation: Patient was prepped and draped in usual sterile fashion           ANESTHESIA    Anesthesia: Local infiltration  Local Anesthetic:  Lidocaine 1% without epinephrine      PRE-PROCEDURE DETAILS:     Hand hygiene: Hand hygiene performed prior to insertion      Sterile barrier technique: All elements of maximal sterile technique followed      Skin preparation:  ChloraPrep    Skin preparation agent: Skin preparation agent completely dried prior to procedure      PROCEDURE DETAILS:     Location:  R femoral    Patient position:  Reverse Trendelenburg    Procedural supplies:  Triple lumen    Landmarks identified: yes      Ultrasound guidance: yes      Sterile ultrasound techniques: Sterile gel and sterile probe covers were used      Number of attempts:  1    Successful placement: yes      POST PROCEDURE DETAILS:      Post-procedure:  Dressing applied and line sutured    Assessment:  Blood return through all ports, no pneumothorax on x-ray, free fluid flow and placement verified by x-ray  PROCEDURE   Patient Tolerance:  Patient tolerated the procedure well with no immediate complications        Interventions:  1024 Lidocaine injection 10 mL Subcutaneous  1036 Albumin 50 g IV   0952 NS, 500 mL, IV bolus   1124 Zosyn 3.375 g IV   1135 Levophed infusion  1448 NS, 500 mL, IV bolus     Emergency Department Course:    Reviewed:  I reviewed nursing notes, vitals, past medical history and care everywhere    Assessments:  0928 Patient arrived with ultrasound tech and nurse in the emergency department. I performed a physical examination as documented above.      0935 IV was started and blood was drawn this was sent to the lab for further testing.     0938 I reviewed the patient's laboratory work from earlier this morning and placed additional orders.    0940 An EKG was taken and I reviewed the results.      1000 A chest x-ray was taken and I viewed the image.    1013 I rechecked and updated the patient.     1015 Ultrasound arrived to perform a paracentesis here in the emergency department.     1017 I spoke with the ED pharmacist regarding the patient.     1041 I spoke with Dr. Barrientos after he performed the paracentesis. He was able to extract 3.5 L of fluid.    1057 I checked in with the patient again following her paracentesis.    1116 I rechecked and updated the patient.     1208 I performed the central line placement as documented above.     1234 I reviewed the patient's labs and then updated her to those results as well as the plan for treatment.     1452 I spoke with Dr. Fatima, hospitalist at [facility], who agreed to admit the patient.     1457 I rechecked the patient and updated her to the plan for transfer to [facility].     Disposition:  The patient was transferred to [facility] via ambulance. Dr. Fatima accepted the patient for transfer.       Impression & Plan   CMS Diagnoses:   The patient has signs of Septic Shock  The patient has signs of septic shock as evidenced by:  1. Presence of Sepsis, AND  2. Persistent hypotension defined by the last 2 BP readings within the ONE HOUR     Time septic shock diagnosis confirmed = 1125  04/29/21   as this was the time when Persistent Hypotension present (2 consecutive SBP <90 or MAP <65 within ONE hour after 30cc/kg IVF bolus completed)    3 Hour Septic Shock Bundle Completion:  1. Initial Lactic Acid Result:   Recent Labs   Lab Test 04/29/21  0942 05/13/18  0935   LACT 1.7 1.4     2. Blood Cultures before Antibiotics: Yes  3. Broad Spectrum Antibiotics Administered:  yes       Anti-infectives (From admission through now)    Start     Dose/Rate Route Frequency Ordered Stop    04/29/21 1115  piperacillin-tazobactam (ZOSYN) 3.375 g vial to attach to  mL bag      3.375 g  over 30 Minutes Intravenous ONCE 04/29/21 1112 04/29/21  1247          4. IF patient is in septic shock within 6 hours of time zero, as defined by:  -Initial Hypotension:  2 low BP readings (SBP <90, MAP <65, or decrease > 40 from baseline due to infection) within 3 hrs of each other during the time period of 6hrs before and 6 hrs  after time zero  -Lactate > or = 4  THEN: Fluid volume administered in ED:  Full bolus NOT administered due to 500 ml of IV fluids given and hepatic failure and acute kidney injury provided Albumin.     BMI Readings from Last 1 Encounters:   02/22/21 26.63 kg/m      30 mL/kg fluids based on weight: 2,180 mL  30 mL/kg fluids based on IBW (must be >= 60 inches tall): 1,710 mL    Septic Shock reassessment:   1. Lactic Acid Level: 1.7   2.  Vasopressor started for persistent hypotension  I attest to having performed a repeat sepsis exam and assessment of perfusion at 1400 and the results demonstrate improved perfusion.    Medical Decision Making:  Patient is a 55-year-old female with liver failure secondary to PSC who presents from a scheduled outpatient paracentesis with hypotension.  Patient's medical history and records were reviewed.  Patient was seen in the stabilization room and on initial presentation had systolic pressures in the mid to upper 80s and patient denied complaints other than feeling of abdominal fullness.  She was afebrile, but had noted intermittent fevers over the last several days.  Abdominal exam was benign with exception of ascites.  Labs and imaging were obtained as well as blood and urine cultures.  EKG demonstrated normal sinus rhythm without significant change compared to previous.  Chest x-ray was unremarkable, patient without respiratory symptoms.  Covid testing negative.  Labs notable for significantly elevated white count (43.2), anemia (hemoglobin 9.7), acute kidney injury (creatinine 2.28), and elevated LFTs as noted above with no significant change compared to previous.  Urine demonstrates a small amount of  pyuria and patient denies urinary symptoms other than decreased urination.  Given significant elevated white count and hypotension patient was initiated on Zosyn after paracentesis was performed.  Paracentesis demonstrates evidence of SBP as noted above; no organisms seen on Gram stain; culture pending.  Later in ED course patient's blood pressure continued to downtrend and she was initiated on Levophed for septic shock.  Patient consented to and had right femoral central line placed.  At this time presentation is consistent with septic shock secondary to SBP.  Unfortunately no ICU beds available at [facility]; patient transferred to [facility].  Accepted by Dr. Fatima.  Abdominal CT obtained prior to transfer and demonstrates no significant acute pathology as noted above.    Critical Care Time: was 45 minutes for this patient excluding procedures    Covid-19  Berta Nava was evaluated during a global COVID-19 pandemic, which necessitated consideration that the patient might be at risk for infection with the SARS-CoV-2 virus that causes COVID-19.   Applicable protocols for evaluation were followed during the patient's care.   COVID-19 was considered as part of the patient's evaluation. The plan for testing is:  a test was obtained during this visit.    Diagnosis:    ICD-10-CM    1. Septic shock (H)  A41.9     R65.21    2. SBP (spontaneous bacterial peritonitis) (H)  K65.2    3. Hyponatremia  E87.1    4. GONZALO (acute kidney injury) (H)  N17.9        Scribe Disclosure:  Kenneth CH, am serving as a scribe on 4/29/2021 at 9:42 AM to personally document services performed by Devon Chu DO based on my observations and the provider's statements to me.        Devon Chu,   04/29/21 1630       Devon Chu,   04/29/21 1630

## 2021-04-30 ENCOUNTER — DOCUMENTATION ONLY (OUTPATIENT)
Dept: TRANSPLANT | Facility: CLINIC | Age: 56
End: 2021-04-30

## 2021-04-30 ENCOUNTER — DOCUMENTATION ONLY (OUTPATIENT)
Dept: OTHER | Facility: CLINIC | Age: 56
End: 2021-04-30

## 2021-04-30 PROBLEM — K65.2 SBP (SPONTANEOUS BACTERIAL PERITONITIS) (H): Status: ACTIVE | Noted: 2021-04-30

## 2021-04-30 LAB
ALP SERPL-CCNC: 387 U/L (ref 40–150)
ALT SERPL W P-5'-P-CCNC: 128 U/L (ref 0–50)
ANION GAP SERPL CALCULATED.3IONS-SCNC: 10 MMOL/L (ref 3–14)
ANION GAP SERPL CALCULATED.3IONS-SCNC: 9 MMOL/L (ref 3–14)
ANISOCYTOSIS BLD QL SMEAR: SLIGHT
AST SERPL W P-5'-P-CCNC: 198 U/L (ref 0–45)
BACTERIA SPEC CULT: NO GROWTH
BASOPHILS # BLD AUTO: 0 10E9/L (ref 0–0.2)
BASOPHILS NFR BLD AUTO: 0 %
BILIRUB SERPL-MCNC: 3.3 MG/DL (ref 0.2–1.3)
BUN SERPL-MCNC: 58 MG/DL (ref 7–30)
BUN SERPL-MCNC: 73 MG/DL (ref 7–30)
CALCIUM SERPL-MCNC: 8.8 MG/DL (ref 8.5–10.1)
CALCIUM SERPL-MCNC: 9.3 MG/DL (ref 8.5–10.1)
CHLORIDE SERPL-SCNC: 102 MMOL/L (ref 94–109)
CHLORIDE SERPL-SCNC: 110 MMOL/L (ref 94–109)
CO2 SERPL-SCNC: 17 MMOL/L (ref 20–32)
CO2 SERPL-SCNC: 17 MMOL/L (ref 20–32)
CREAT SERPL-MCNC: 1.2 MG/DL (ref 0.52–1.04)
CREAT SERPL-MCNC: 1.69 MG/DL (ref 0.52–1.04)
DIFFERENTIAL METHOD BLD: ABNORMAL
EOSINOPHIL # BLD AUTO: 0 10E9/L (ref 0–0.7)
EOSINOPHIL NFR BLD AUTO: 0 %
ERYTHROCYTE [DISTWIDTH] IN BLOOD BY AUTOMATED COUNT: 15.3 % (ref 10–15)
GFR SERPL CREATININE-BSD FRML MDRD: 33 ML/MIN/{1.73_M2}
GFR SERPL CREATININE-BSD FRML MDRD: 50 ML/MIN/{1.73_M2}
GLUCOSE BLDC GLUCOMTR-MCNC: 135 MG/DL (ref 70–99)
GLUCOSE BLDC GLUCOMTR-MCNC: 163 MG/DL (ref 70–99)
GLUCOSE BLDC GLUCOMTR-MCNC: 164 MG/DL (ref 70–99)
GLUCOSE BLDC GLUCOMTR-MCNC: 199 MG/DL (ref 70–99)
GLUCOSE SERPL-MCNC: 161 MG/DL (ref 70–99)
GLUCOSE SERPL-MCNC: 196 MG/DL (ref 70–99)
HCT VFR BLD AUTO: 28 % (ref 35–47)
HGB BLD-MCNC: 9.7 G/DL (ref 11.7–15.7)
LACTATE BLD-SCNC: 1.1 MMOL/L (ref 0.7–2)
LYMPHOCYTES # BLD AUTO: 0.7 10E9/L (ref 0.8–5.3)
LYMPHOCYTES NFR BLD AUTO: 2 %
Lab: NORMAL
MCH RBC QN AUTO: 31 PG (ref 26.5–33)
MCHC RBC AUTO-ENTMCNC: 34.6 G/DL (ref 31.5–36.5)
MCV RBC AUTO: 90 FL (ref 78–100)
MONOCYTES # BLD AUTO: 0.7 10E9/L (ref 0–1.3)
MONOCYTES NFR BLD AUTO: 2 %
NEUTROPHILS # BLD AUTO: 35.3 10E9/L (ref 1.6–8.3)
NEUTROPHILS NFR BLD AUTO: 96 %
PLATELET # BLD AUTO: 434 10E9/L (ref 150–450)
PLATELET # BLD EST: ABNORMAL 10*3/UL
POTASSIUM SERPL-SCNC: 3.8 MMOL/L (ref 3.4–5.3)
POTASSIUM SERPL-SCNC: 4.7 MMOL/L (ref 3.4–5.3)
PROCALCITONIN SERPL-MCNC: 5.41 NG/ML
RBC # BLD AUTO: 3.13 10E12/L (ref 3.8–5.2)
SODIUM SERPL-SCNC: 129 MMOL/L (ref 133–144)
SODIUM SERPL-SCNC: 136 MMOL/L (ref 133–144)
SPECIMEN SOURCE: NORMAL
WBC # BLD AUTO: 36.8 10E9/L (ref 4–11)

## 2021-04-30 PROCEDURE — 250N000011 HC RX IP 250 OP 636: Performed by: INTERNAL MEDICINE

## 2021-04-30 PROCEDURE — 84075 ASSAY ALKALINE PHOSPHATASE: CPT | Performed by: INTERNAL MEDICINE

## 2021-04-30 PROCEDURE — 83605 ASSAY OF LACTIC ACID: CPT | Performed by: INTERNAL MEDICINE

## 2021-04-30 PROCEDURE — 999N001017 HC STATISTIC GLUCOSE BY METER IP

## 2021-04-30 PROCEDURE — 80048 BASIC METABOLIC PNL TOTAL CA: CPT | Performed by: INTERNAL MEDICINE

## 2021-04-30 PROCEDURE — 200N000001 HC R&B ICU

## 2021-04-30 PROCEDURE — 84450 TRANSFERASE (AST) (SGOT): CPT | Performed by: INTERNAL MEDICINE

## 2021-04-30 PROCEDURE — 84460 ALANINE AMINO (ALT) (SGPT): CPT | Performed by: INTERNAL MEDICINE

## 2021-04-30 PROCEDURE — 258N000003 HC RX IP 258 OP 636: Performed by: INTERNAL MEDICINE

## 2021-04-30 PROCEDURE — 99207 PR NO BILLABLE SERVICE THIS VISIT: CPT | Performed by: INTERNAL MEDICINE

## 2021-04-30 PROCEDURE — 85025 COMPLETE CBC W/AUTO DIFF WBC: CPT | Performed by: INTERNAL MEDICINE

## 2021-04-30 PROCEDURE — 250N000009 HC RX 250: Performed by: INTERNAL MEDICINE

## 2021-04-30 PROCEDURE — 99233 SBSQ HOSP IP/OBS HIGH 50: CPT | Performed by: INTERNAL MEDICINE

## 2021-04-30 PROCEDURE — 82247 BILIRUBIN TOTAL: CPT | Performed by: INTERNAL MEDICINE

## 2021-04-30 PROCEDURE — 84145 PROCALCITONIN (PCT): CPT | Performed by: INTERNAL MEDICINE

## 2021-04-30 RX ORDER — SODIUM CHLORIDE 9 MG/ML
INJECTION, SOLUTION INTRAVENOUS CONTINUOUS
Status: DISCONTINUED | OUTPATIENT
Start: 2021-04-30 | End: 2021-05-02

## 2021-04-30 RX ORDER — NOREPINEPHRINE BITARTRATE 0.06 MG/ML
.03-.4 INJECTION, SOLUTION INTRAVENOUS CONTINUOUS
DISCHARGE
Start: 2021-04-30 | End: 2021-05-02

## 2021-04-30 RX ADMIN — HYDROCORTISONE SODIUM SUCCINATE 50 MG: 100 INJECTION, POWDER, FOR SOLUTION INTRAMUSCULAR; INTRAVENOUS at 14:25

## 2021-04-30 RX ADMIN — HYDROCORTISONE SODIUM SUCCINATE 100 MG: 100 INJECTION, POWDER, FOR SOLUTION INTRAMUSCULAR; INTRAVENOUS at 05:02

## 2021-04-30 RX ADMIN — SODIUM CHLORIDE: 9 INJECTION, SOLUTION INTRAVENOUS at 08:51

## 2021-04-30 RX ADMIN — SODIUM CHLORIDE: 9 INJECTION, SOLUTION INTRAVENOUS at 20:07

## 2021-04-30 RX ADMIN — HYDROCORTISONE SODIUM SUCCINATE 50 MG: 100 INJECTION, POWDER, FOR SOLUTION INTRAMUSCULAR; INTRAVENOUS at 20:07

## 2021-04-30 RX ADMIN — TAZOBACTAM SODIUM AND PIPERACILLIN SODIUM 3.38 G: 375; 3 INJECTION, SOLUTION INTRAVENOUS at 06:34

## 2021-04-30 RX ADMIN — VASOPRESSIN 2.4 UNITS/HR: 20 INJECTION INTRAVENOUS at 06:42

## 2021-04-30 RX ADMIN — TAZOBACTAM SODIUM AND PIPERACILLIN SODIUM 3.38 G: 375; 3 INJECTION, SOLUTION INTRAVENOUS at 20:07

## 2021-04-30 RX ADMIN — TAZOBACTAM SODIUM AND PIPERACILLIN SODIUM 3.38 G: 375; 3 INJECTION, SOLUTION INTRAVENOUS at 14:25

## 2021-04-30 RX ADMIN — TAZOBACTAM SODIUM AND PIPERACILLIN SODIUM 3.38 G: 375; 3 INJECTION, SOLUTION INTRAVENOUS at 01:33

## 2021-04-30 RX ADMIN — Medication 0.28 MCG/KG/MIN: at 08:02

## 2021-04-30 ASSESSMENT — ACTIVITIES OF DAILY LIVING (ADL)
ADLS_ACUITY_SCORE: 15

## 2021-04-30 ASSESSMENT — MIFFLIN-ST. JEOR: SCORE: 1310.88

## 2021-04-30 NOTE — PROGRESS NOTES
[facility]  Hospitalist Progress Note  Vern Leblanc MD 04/30/2021    Reason for Stay/active problem list      Septic shock    Streptococcus bacteremia    Spontaneous bacterial peritonitis    Chronic liver disease with ascites requiring periodic paracentesis    Acute kidney injury         Assessment and Plan:        Summary of Stay:     Patient is a 55-year-old lady with a history of primary sclerosing cholangitis.  She has a history of recurrent ascites and undergoes paracentesis on a regular basis.    She went get another paracentesis done  and was found to have a very low blood pressure in the 60s.  So she was sent to the [facility]  emergency room.  Patient also gives a history of low-grade fever at home.  But she denied any significant abdominal pain.     Her work-up in the emergency room showed significant leukocytosis of around 43,000, acute kidney injury, elevated LFTs.     She underwent paracentesis by IR in the emergency room.  The fluid analysis showed evidence of SBP based on cell counts.     Patient was started on IV Zosyn and she was admitted to [facility] the [facility] ICU       Patient progress during stay: Patient was admitted to intensive care unit and closely monitored with IV antibiotic and norepinephrine.  Blood culture obtained earlier grew Streptococcus species.  Hypotension persisted and patient required albumin and hydrocortisone as well.  Gastroenterology as well as intensive care medicine physician were consulted.              Problem List with Assessment and Plan      1. Septic shock likely due to spontaneous bacterial peritonitis and suspicious bacteremia versus hepatobiliary sepsis    Currently on Zosyn.  Afebrile.  WBC count is trending down at 36.8 from 43.2 yesterday.    Blood culture from right arm April 29, 2021 grew Streptococcus species.  Acetic fluid culture remained with no growth.  Urine culture negative.    Still requiring Levophed and vasopressin.  Titrate down as  able    Hydrocortisone 100 mg every 8 hours started yesterday, may decrease to 50 every 8 hours    Continue Zosyn, added procalcitonin, infectious disease physician consult to assist with further antibiotic plan.       2. Acute kidney injury      Normal baseline kidney function.  Suspect this is due to septic shock.    Kidney function improving with serum creatinine 1.69 down from 2.28 yesterday.    Continue to treat intake and output monitoring    3. Chronic liver disease due to primary sclerosing cholangitis with liver cirrhosis, recurrent ascites requiring paracentesis esophageal varices, biliary stent in place      LFTs are elevated including total bili of 3.3, alk phos 387 trending down,  trending down from  trending down.    GI consulted and patient was seen by GI staff.  Plan to continue antibiotic, await final recommendation by GI team patient's further procedures such as ERCP.      4.  Hyponatremia: Serum sodium 129 today, improving.  Likely chronic.  Continue to monitor for now    5.  Ascites: Status post paracentesis.  3.5 L fluid removed yesterday in the emergency department.  Currently soft abdomen, respiratory status is stable    6.    Chronic medical problems:      Hypertension: Currently hypotensive.  Continue to hold lisinopril and nadolol    Hyperlipidemia: Hold statin due to liver failure          Plan for today:    ID consultation    Try to titrate off pressor as tolerated    GI recommendations    Continue ICU care          LDA     Access : Peripheral    Jurado Catheter: not present        FEN :    Orders Placed This Encounter      Combination Diet 2 gm NA Diet           Intake/Output Summary (Last 24 hours) at 4/30/2021 0950  Last data filed at 4/30/2021 0802  Gross per 24 hour   Intake 1364.89 ml   Output 300 ml   Net 1064.89 ml          DVT Prophylaxis:     Pneumatic Compression Devices    Code Status:      Full Code    Estimated discharge  disposition and plan:    Continue ICU  "care for today.  May transfer to medical floor if hypotension is resolved            Interval History (Subjective):        Patient is seen and examined by me today and medical record reviewed.Overnight events noted and care discussed with nursing staff.  Patient is doing well without any new complaints.  No nausea vomiting or diarrhea.  No fever.  She has no chest pain.  She was seen by GI team and care plan was discussed with treatment team during care rounds.              Physical Exam:        Last Vital Signs:  BP (!) 88/48   Pulse 77   Temp 97.6  F (36.4  C) (Oral)   Resp 28   Ht 1.651 m (5' 5\")   Wt 72.5 kg (159 lb 13.3 oz)   SpO2 96%   BMI 26.60 kg/m      I/O last 3 completed shifts:  In: 1360.01 [I.V.:860.01]  Out: 300 [Urine:300]    Wt Readings from Last 5 Encounters:   04/30/21 72.5 kg (159 lb 13.3 oz)   02/22/21 72.6 kg (160 lb)   05/17/18 74.6 kg (164 lb 6.4 oz)        Constitutional: Awake, alert, cooperative, no apparent distress     Respiratory: Clear to auscultation bilaterally, no crackles or wheezing   Cardiovascular: Regular rate and rhythm, normal S1 and S2, and no murmur noted   Abdomen: Normal bowel sounds, soft, non-distended, non-tender   Skin: No new rashes, no cyanosis, dry to touch   Neuro: Alert with  no new focal weakness   Extremities: No edema, normal range of motion   Other(s):        All other systems: Negative          Medications:        All current medications were reviewed with changes reflected in problem list.         Data:      All new lab and imaging data was reviewed.      Data reviewed today: I reviewed all new labs and imaging results over the last 24 hours. I personally reviewed no images or EKG's today      Recent Labs   Lab 04/30/21  0455 04/29/21  0942 04/29/21  0815   WBC 36.8* 43.2*  --    HGB 9.7* 9.7*  --    HCT 28.0* 29.4*  --    MCV 90 90  --     427 459*     Recent Labs   Lab 04/29/21  1106 04/29/21  1027 04/29/21  0942   CULT Cultured on the 1st " day of incubation:  Gram positive cocci in pairs and chains  *  Critical Value/Significant Value, preliminary result only, called to and read back by  Sue Gutierrez RN on 4.30.2021 at 0942. KVO    PENDING Culture negative monitoring continues Cultured on the 1st day of incubation:  Gram positive cocci in pairs and chains  *  Critical Value/Significant Value, preliminary result only, called to and read back by  Shahbaz Hill RN @ 0410 4/30/21 TM.    (Note)  POSITIVE for STREPTOCOCCUS SPECIES OTHER THAN pneumococcus, anginosus  group, S. pyogenes and S. agalactiae. Performed using [employer]  multiplex nucleic acid test. Final identification and antimicrobial  susceptibility testing will be verified by standard methods.    Specimen tested with Verigene multiplex, gram-positive blood culture  nucleic acid test for the following targets: Staph aureus, Staph  epidermidis, Staph lugdunensis, other Staph species, Enterococcus  faecalis, Enterococcus faecium, Streptococcus species, S. agalactiae,  S. anginosus grp., S. pneumoniae, S. pyogenes, Listeria sp., mecA  (methicillin resistance) and Fanny/B (vancomycin resistance).    Critical Value/Significant Value called to and read back by  Yousuf Traylor RN 4/30/21 @ 0649 TF       Recent Labs   Lab 04/30/21 0455 04/29/21  0942   * 127*   POTASSIUM 4.7 5.3   CHLORIDE 102 98   CO2 17* 19*   ANIONGAP 10 10   * 71   BUN 73* 76*   CR 1.69* 2.28*   GFRESTIMATED 33* 23*   GFRESTBLACK 38* 27*   MARIELENA 9.3 10.3*   PROTTOTAL  --  7.0   ALBUMIN  --  1.5*   BILITOTAL 3.3* 3.2*   ALKPHOS 387* 409*   * 244*   * 143*       Recent Labs   Lab 04/30/21  0455 04/30/21  0323 04/29/21  2332 04/29/21 2016 04/29/21  1808 04/29/21  1739 04/29/21  0942   *  --   --   --   --   --  71   BGM  --  199* 141* 110* 83 64*  --        Recent Labs   Lab 04/29/21  0815   INR 1.51*         No results for input(s): TROPONIN, TROPI, TROPR in the last 168 hours.    Invalid  input(s): TROP, TROPONINIES    Recent Results (from the past 48 hour(s))   US Abdomen Limited    Narrative    US ABDOMEN LIMITED   4/29/2021 9:31 AM    HISTORY:  55-year-old woman with recurrent ascites, request made for  paracentesis.    TECHNIQUE: Patient was brought to the ultrasound department and  informed consent obtained. Prior to beginning procedure, blood  pressure was known to be low, initially  approximately 70/35. This  subsequently was 77/45, though final measurement was 60/20. Patient is  coherent, though lethargic. Given this finding, plan is to bring the  patient to the ER for evaluation.      Impression    IMPRESSION: Patient is hypotensive upon arrival. Lethargic. To be  taken to the ER for evaluation.     NICK WU MD   XR Chest Port 1 View    Narrative    CHEST ONE VIEW April 29, 2021 10:00 AM     HISTORY: Fever.    COMPARISON: None.      Impression    IMPRESSION: No acute disease.    KARIN GALLARDO MD   US Paracentesis    Narrative    US PARACENTESIS  4/29/2021 10:51 AM       HISTORY: High volume paracentesis with or without diagnostic fluid  analysis with labs to be drawn if ordered. Total paracentesis volume  as much as possible.    PROCEDURE:  Written informed consent was obtained from the patient  prior to the procedure. The risks and benefits including bleeding,  infection and abdominal organ injury were discussed and the patient  wished to continue. Initial ultrasound images demonstrated a large  right lower quadrant fluid collection. A permanent image was saved.  The skin overlying this collection was marked, prepped, draped and  anesthetized in usual sterile fashion utilizing 10 mL of lidocaine 1%.  The paracentesis catheter was then placed into the peritoneal fluid  collection with return of 3500 mL of yellow fluid. Estimated blood  loss during the procedure was less than 5 mL. No specimens collected.  Patient tolerated the procedure well. The patient will receive  intravenous  albumin on the usual sliding scale as needed per protocol.       With continuous ultrasound guidance, 8 French needle and catheter  advanced into the ascites and ultrasound image was stored for  documentation.      Impression    IMPRESSION:  Ultrasound-guided paracentesis.  3.5 L removed. Sample  sent to the lab for evaluation.      NICK WU MD   CT Abdomen Pelvis w/o Contrast    Narrative    CT ABDOMEN PELVIS WITHOUT CONTRAST 4/29/2021 3:29 PM    CLINICAL HISTORY: Hypotension, septic shock.   TECHNIQUE: CT scan of the abdomen and pelvis was performed without IV  contrast. Multiplanar reformats were obtained. Dose reduction  techniques were used.  CONTRAST: None.    COMPARISON: MRI from February 4, 2021    FINDINGS:   LOWER CHEST: Minimal bibasilar atelectasis vs. less likely infiltrate  right worse than left. Trace pleural fluid.    HEPATOBILIARY: Cirrhotic-appearing liver morphology. Distended  gallbladder similar to previous. No calcified gallstones. Biliary  stent noted.    PANCREAS: Grossly unremarkable.    SPLEEN: Splenomegaly at 14.5 cm. No focal lesions.    ADRENAL GLANDS: Somewhat thickened bilaterally. No discrete nodules.    KIDNEYS/BLADDER: No significant mass, stones, or hydronephrosis.    BOWEL: No obstruction or inflammatory change.    LYMPH NODES: No gross lymphadenopathy in the absence of contrast.    VASCULATURE: No abdominal aortic aneurysm.    PELVIC ORGANS: No pelvic masses.    OTHER: Small to moderate amount of ascites. No free air.    MUSCULOSKELETAL: No suspicious bony lesions.      Impression    IMPRESSION:   1.  Small to moderate amount of ascites.  2.  Cirrhotic liver morphology and distended gallbladder similar to  previous. Biliary stent appears to extend from the left lobe to the  duodenum.  3.  No hydronephrosis or bowel obstruction demonstrated.    KARIN GALLARDO MD       COVID Status:  COVID-19 PCR Results    COVID-19 PCR Results 11/3/20 11/29/20 12/2/20 12/21/20 1/2/21 1/6/21  4/8/21 4/8/21 4/27/21 4/27/21 4/29/21          1500 1500 1332 1332    COVID-19 Virus PCR to [facility] - Result       Test received-See reflex to IDDL test SARS CoV2 (COVID-19) Virus RT-PCR  Test received-See reflex to IDDL test SARS CoV2 (COVID-19) Virus RT-PCR     COVID-19 Virus PCR to [facility] - Source       Nasopharyngeal  Nasopharyngeal     COVID-19 Virus by PCR (External Result) Not Detected Not Detected Not Detected Not Detected Not Detected Not Detected        SARS-CoV-2 Virus Specimen Source        Nasopharyngeal  Nasopharyngeal    Flu A/B & SARS-COV-2 PCR Source           Nasopharyngeal   SARS-CoV-2 PCR Result        NEGATIVE  NEGATIVE NEGATIVE      Comments are available for some flowsheets but are not being displayed.         COVID-19 Antibody Results, Testing for Immunity    COVID-19 Antibody Results, Testing for Immunity   No data to display.              Disclaimer: This note consists of symbols derived from keyboarding, dictation and/or voice recognition software. As a result, there may be errors in the script that have gone undetected. Please consider this when interpreting information found in this chart.

## 2021-04-30 NOTE — PHARMACY-ADMISSION MEDICATION HISTORY
Admission medication history interview status for this patient is complete. See [facility] admission navigator for allergy information, prior to admission medications and immunization status.     Medication history interview done, indicate source(s): Patient  Medication history resources (including written lists, pill bottles, clinic record): [facility] and Care Everywhere  Pharmacy: [facility]. [facility] for discharge    Changes made to PTA medication list:  Added: none  Deleted: none  Changed: none    Actions taken by pharmacist (provider contacted, etc): Spoke with patient to verify home med list     Additional medication history information:None    Medication reconciliation/reorder completed by provider prior to medication history?  Y   (Y/N)       Prior to Admission medications    Medication Sig Last Dose Taking? Auth Provider   calcium carbonate 600 mg-vitamin D 400 units (CALTRATE) 600-400 MG-UNIT per tablet Take 1 tablet by mouth daily  4/28/2021 at am Yes Reported, Patient   furosemide (LASIX) 20 MG tablet Take 1 tablet (20 mg) by mouth daily 4/28/2021 at am Yes Leventhal, Thomas Michael, MD   lisinopril (ZESTRIL) 10 MG tablet Take 10 mg by mouth daily  4/28/2021 at am Yes Reported, Patient   mesalamine (LIALDA) 1.2 g EC tablet Take 2 tablets (2,400 mg) by mouth daily 4/28/2021 at am Yes Leventhal, Thomas Michael, MD   multivitamin (CENTRUM SILVER) tablet Take 1 tablet by mouth daily  4/28/2021 at am Yes Reported, Patient   nadolol (CORGARD) 20 MG tablet Take 3 tablets (60 mg) by mouth daily 4/28/2021 at am Yes Leventhal, Thomas Michael, MD   potassium chloride ER (MICRO-K) 10 MEQ CR capsule Take 10 mEq by mouth At Bedtime  4/28/2021 at pm Yes Reported, Patient   simvastatin (ZOCOR) 20 MG tablet Take 20 mg by mouth At Bedtime 4/28/2021 at pm Yes Unknown, Entered By History   spironolactone (ALDACTONE) 50 MG tablet Take 1 tablet (50 mg) by mouth daily 4/28/2021 at am Yes  Leventhal, Thomas Michael, MD   ursodiol (ACTIGALL) 300 MG capsule Take 1 capsule (300 mg) by mouth 2 times daily 4/28/2021 at am Yes Leventhal, Thomas Michael, MD

## 2021-04-30 NOTE — CONSULTS
Consult Date: 04/30/2021    INFECTIOUS DISEASE CONSULTATION    LOCATION:  Room 360, ICU, [facility]    REFERRING PHYSICIAN:  Gilberto Leblanc MD    IMPRESSION:     1.  A 55-year-old female with acute sepsis, found to have streptococcal bacteremia.  Identification to follow, almost certainly coming from abdominal infection, either biliary related or spontaneous bacterial peritonitis.  2.  Sclerosing cholangitis, chronically abnormal liver function tests and has a stent in place, no major dilatation currently, but liver function test markedly more abnormal.  3.  Hepatic failure, on transplant list at the [facility].  4.  Ascites, tap of fluid abnormal suggesting possible peritonitis, culture pending.  5.  Chronic ulcerative colitis.    RECOMMENDATIONS:     1.  Continue Zosyn covering broadly, await identification of blood culture organism and follow the clinical progress and adjust accordingly.  2.  Plan looks like it is transfer from here to the [facility] when available.  We will follow while she is here and treat as needed.    HISTORY OF PRESENT ILLNESS:  This is a 55-year-old female seen in consultation due to bacteremia and sepsis.  The patient has a history of ulcerative colitis and sclerosing cholangitis.  The sclerosing cholangitis has been such that she has chronic cirrhotic liver, progressing to the point where she is on the transplant list.  She has not had major infection problems historically and only recently has required ascites taps.  She currently describes not feeling well for the last week or so, was not clearly having fever, but at the time of presentation had hypotension and some low-grade fever.  Blood cultures were obtained and are 2/2 positive for a streptococcal organism not yet identified.  The patient has maybe some slight increased abdominal pain mainly due to fluid rather than other symptoms.  No respiratory, cardiovascular or other musculoskeletal  symptoms.    PAST MEDICAL HISTORY:  Chronic ulcerative colitis that has been fairly stable, not requiring biologic agents.  Primary sclerosing cholangitis, history of chronic progressive liver disease, history of Sjogren's and sicca syndrome.    ALLERGIES:  NO ANTIBIOTIC ALLERGIES.    MEDICATIONS:  As listed.    FAMILY HISTORY:  No recent travel or exposures.  No known resistant pathogens.    REVIEW OF SYSTEMS:  Largely as listed above.  Some degree of abdominal distention with slight pain, but nothing remarkable.  Definite fevers, some chills, decreased appetite and not feeling well for the last week.    PHYSICAL EXAMINATION:    GENERAL:  The patient appears stated age, looks chronically ill, mildly icteric.  HEENT:  No other thrush or intraoral lesions.  NECK:  Supple and nontender.  No meningismus.  HEART:  Tachycardic in the 100+ range.  LUNGS:  Crackles at both bases.  ABDOMEN:  Mildly distended, has been tapped, so less distended than previously and not that tender.  EXTREMITIES:  Slight edema, but nothing remarkable.  SKIN:  No rashes or skin lesions.    LABORATORY DATA:  Blood cultures growing a strep organism, did not identify by rapid technique.  White blood cell count 43,200.  Liver function tests significantly elevated from prior baseline.  Current , .  Creatinine mildly elevated to 1.69 currently from previous normal baseline.    Thank you very much for this consultation.  I will follow the patient with you.    Sebastian White MD        D: 2021   T: 2021   MT: PAKMT    Name:     ESTUARDO JOYCE  MRN:      [id number]        Account:      [id number]   :      1965           Consult Date: 2021     Document: [id number]

## 2021-04-30 NOTE — ANESTHESIA PROCEDURE NOTES
Arterial Line Procedure Note  Pre-Procedure   Staff -        Anesthesiologist:  Asa Wadsworth DO       Performed By: anesthesiologist       Referred By: MD Kushal       Location: ICU       Pre-Anesthestic Checklist: patient identified, IV checked, risks and benefits discussed, informed consent, monitors and equipment checked, pre-op evaluation and at physician/surgeon's request  Timeout:       Correct Patient: Yes        Correct Procedure: Yes        Correct Site: Yes        Correct Position: Yes   Procedure   Procedure: arterial line       Laterality: left       Insertion Site: radial.  Sterile Prep        Standard elements of sterile barrier followed       Skin prep: Betadine  Insertion/Injection        Technique: Seldinger Technique        Catheter Type/Size: 20 G, 1.75 in/4.5 cm quick cath (integral wire)  Narrative         Secured by: suture       Biopatch dressing used.       Complications: None apparent,      Arterial waveform: Yes   Comments:  Tolerated well, no apparent complications

## 2021-04-30 NOTE — ANESTHESIA POSTPROCEDURE EVALUATION
Patient: Berta Nava    * No procedures listed *    Diagnosis:* No pre-op diagnosis entered *  Diagnosis Additional Information: No value filed.    Anesthesia Type:  Other (see Comments)    Note:     Postop Pain Control:    PONV:    Neuro/Psych:    Airway/Respiratory:    CV/Hemodynamics:    Other NRE:    DID A NON-ROUTINE EVENT OCCUR?     Event details/Postop Comments:  Hypotension requiring pressor support in ICU           Last vitals:  Vitals:    04/29/21 1745 04/29/21 1800   BP: 105/63 97/55   Pulse: 72 73   Resp: 24 27   Temp: 97.6  F (36.4  C)    SpO2: 97% 96%       Last vitals prior to Anesthesia Care Transfer:      Electronically Signed By: Asa Wadsworth DO  April 29, 2021  9:42 PM

## 2021-04-30 NOTE — PROGRESS NOTES
55-year-old female with advanced sclerosing cholangitis [facility] and per the patient and review the notes she is completed most of the evaluation with good results but has not been officially listed for transplant yet.  She has biliary stents.  She gets paracentesis regularly for ascites.    Last paracentesis was 4//13 and was uncomplicated.  Last 2 or 3 days she has been having malaise, fevers to 101 but no abdominal pain.  Poor appetite.  Sweats.  Generalized weakness.  She came in for her scheduled paracentesis yesterday and her blood pressure was in the 60s, she was transferred to the emergency room.  Central line was placed after fluid fluids did not improve her blood pressure.  White cell count was 43,000 and she was in acute kidney failure.  Scan showed ascites and nodular liver.  No other obvious signs of infection.  She was started on antibiotics.  Paracentesis was performed with 3.5 L removed which showed about 1100 white cell count 65% neutrophils;  no organisms seen on Gram stain.  Her blood pressure remained low and she was started on norepinephrine and transferred from [facility] ED to [facility] ICU.  Arterial line was placed around 9 PM by anesthesia as she had escalating pressor needs including vasopressin.  She was given steroids for septic shock.  She is making urine.    Overnight she remains awake and alert and having no pain.  Urinary output 300 mill liters since midnight.  Less pressor requirements.  Lactate was normal.  Now has positive blood cultures for a strep species not identified yet. She's eating a little and on room air     Last Comprehensive Metabolic Panel:  Sodium   Date Value Ref Range Status   04/30/2021 129 (L) 133 - 144 mmol/L Final     Potassium   Date Value Ref Range Status   04/30/2021 4.7 3.4 - 5.3 mmol/L Final     Chloride   Date Value Ref Range Status   04/30/2021 102 94 - 109 mmol/L Final     Carbon Dioxide   Date Value Ref Range  Status   04/30/2021 17 (L) 20 - 32 mmol/L Final     Anion Gap   Date Value Ref Range Status   04/30/2021 10 3 - 14 mmol/L Final     Glucose   Date Value Ref Range Status   04/30/2021 196 (H) 70 - 99 mg/dL Final     Urea Nitrogen   Date Value Ref Range Status   04/30/2021 73 (H) 7 - 30 mg/dL Final     Creatinine   Date Value Ref Range Status   04/30/2021 1.69 (H) 0.52 - 1.04 mg/dL Final     GFR Estimate   Date Value Ref Range Status   04/30/2021 33 (L) >60 mL/min/[1.73_m2] Final     Comment:     Non  GFR Calc  Starting 12/18/2018, serum creatinine based estimated GFR (eGFR) will be   calculated using the Chronic Kidney Disease Epidemiology Collaboration   (CKD-EPI) equation.       Calcium   Date Value Ref Range Status   04/30/2021 9.3 8.5 - 10.1 mg/dL Final         PE:  Afebrile 120/60  HR 80  RR 19 on room air   Awake and alert and not in distress  Lungs clear  h- RR  Abdomen non tender non distended no significant ascites   Left radial art line  Right femoral TLC  Feet and hands are warm,  No edema   Minimal scleral icterus     Assessment: Septic shock from strep bacteremia.  Acute kidney injury. Hyponatremia related to GONZALO And liver disease does not need specific treatment.  Her bilirubin is about the same;  her transaminases which were chronically elevated are slightly higher.  I suspect source of infection is more likely cholangitis and/or biliary stents instead of spontaneous bacterial peritonitis.  Seems to be responding to antibiotics and steroids and pressors.  Acute kidney injury but improved urinary output.    Plan continue current antibiotics until species identified.  Await if there are any positive cultures from the ascitic fluid.  GI has been consulted and she may need a stent exchange by ERCP.  Will wean vasopressin first then norepinephrine to achieve map goal of 60.  Patient states her blood pressure in clinic is around 100/60.  Normal lactate suggest she has adequate perfusion  currently.  Repeat blood cultures in AM  Serial BMP and CMP in AM  NS at 100 for today

## 2021-04-30 NOTE — DISCHARGE SUMMARY
[facility]  Hospitalist Discharge Summary      Date of Admission:  4/29/2021  Date of Discharge:  4/30/2021  Discharging Provider: Vern Leblanc MD      Discharge Diagnoses       Septic shock    Streptococcus bacteremia    Spontaneous bacterial peritonitis     Suspected hepatobiliary sepsis    Chronic liver disease with ascites requiring periodic paracentesis    Acute kidney injury    Follow-ups Needed After Discharge   Follow-up Appointments     Follow Up and recommended labs and tests      Transfer to [facility] ICU team, consultation   with hepatologist Dr. Leventhal             Unresulted Labs Ordered in the Past 30 Days of this Admission     Date and Time Order Name Status Description    4/29/2021 0951 Fluid Culture Aerobic Bacterial Preliminary     4/29/2021 0949 Blood culture Preliminary     4/29/2021 0949 Blood culture Preliminary     4/29/2021 0949 Urine Culture Preliminary       These results will be followed up by [facility] ICU team    Discharge Disposition   Transferred to [facility] ICU  Condition at discharge: Stable      Hospital Course      Patient is a 55-year-old lady with a history of primary sclerosing cholangitis.  She has a history of recurrent ascites and undergoes paracentesis on a regular basis.    She went get another paracentesis done  and was found to have a very low blood pressure in the 60s.  So she was sent to the [facility]  emergency room.  Patient also gives a history of low-grade fever at home.  But she denied any significant abdominal pain.     Her work-up in the emergency room showed significant leukocytosis of around 43,000, acute kidney injury, elevated LFTs.     She underwent paracentesis by IR in the emergency room.  The fluid analysis showed evidence of SBP based on cell counts.     Patient was started on IV Zosyn and she was admitted to [facility] ICU         Patient was  admitted to intensive care unit and closely monitored with IV antibiotic and norepinephrine support.  Blood culture obtained earlier grew Streptococcus species and culture and sensitivities pending.    Hypotension persisted and patient required albumin and hydrocortisone as well. Gastroenterology as well as intensive care medicine physician were consulted.    Patient was seen by gastroenterology team and I discussed with Dr. Shafer who recommended to transfer the patient to [facility] given patient's complex care requirement including  possible hepatobiliary intervention and evaluation for liver transplant.    Dr. Leventhal from [facility] hepatology unit was also contacted and he agreed with  transferring this patient to the [facility] ICU team for further care.  Dr. Leventhal will be consulting after admission to [facility].  Dr Perlman of ICU team at [facility] accepted patient to his care when bed is available.    On the day of transfer, patient was alert and oriented x3.  Patient has history of blood pressure 90s on a low-dose norepinephrine drip.           Consultations This Hospital Stay   GASTROENTEROLOGY IP CONSULT  NEPHROLOGY IP CONSULT  ADVANCE DIRECTIVE IP CONSULT  INFECTIOUS DISEASES IP CONSULT  NUTRITION SERVICES ADULT IP CONSULT    Code Status   Full Code    Time Spent on this Encounter   I, Vern Leblanc MD, personally saw the patient today and spent greater than 30 minutes discharging this patient.       Vern Leblanc MD  [facility] ICU  [address] [place] [address]  Phone: [phone]  Fax: [phone]  ______________________________________________________________________    Physical Exam   Vital Signs: Temp: 98  F (36.7  C) Temp src: Axillary BP: (!) 88/48 Pulse: 62   Resp: 18 SpO2: 98 % O2 Device: None (Room air)    Weight: 159 lbs 13.34 oz         Primary Care Physician    [facility]    Discharge Orders      Reason for your hospital stay    Severe sepsis with septic shock     Intake and output    Every shift     Activity - Up ad indy     Follow Up and recommended labs and tests    Transfer to [facility] ICU team, consultation with hepatologist Dr. Leventhal     Full Code     Advance Diet as Tolerated    Follow this diet upon discharge: Advance to a regular diet as tolerated       Significant Results and Procedures   Most Recent 3 CBC's:  Recent Labs   Lab Test 04/30/21  0455 04/29/21  0942 04/29/21  0815 03/23/21  0744   WBC 36.8* 43.2*  --  15.2*   HGB 9.7* 9.7*  --  10.7*   MCV 90 90  --  88    427 459* 483*     Most Recent 3 BMP's:  Recent Labs   Lab Test 04/30/21  0455 04/29/21  0942 03/23/21  0744   * 127* 136   POTASSIUM 4.7 5.3 3.8   CHLORIDE 102 98 105   CO2 17* 19* 23   BUN 73* 76* 14   CR 1.69* 2.28* 0.64   ANIONGAP 10 10 8   MARIELENA 9.3 10.3* 9.3   * 71 90     Most Recent 2 LFT's:  Recent Labs   Lab Test 04/30/21  0455 04/29/21  0942   * 244*   * 143*   ALKPHOS 387* 409*   BILITOTAL 3.3* 3.2*     Most Recent 3 INR's:  Recent Labs   Lab Test 04/29/21  0815 03/23/21  0744 05/13/18  0935   INR 1.51* 1.39* 1.22*     Most Recent 3 Troponin's:No lab results found.  Most Recent 3 BNP's:No lab results found.  Most Recent D-dimer:No lab results found.  Most Recent 6 Bacteria Isolates From Any Culture (See EPIC Reports for Culture Details):  Recent Labs   Lab Test 04/29/21  1106 04/29/21  1027 04/29/21  0942 05/13/18  1145 05/13/18  1143   CULT Culture negative monitoring continues  Cultured on the 1st day of incubation:  Gram positive cocci in pairs and chains  *  Critical Value/Significant Value, preliminary result only, called to and read back by  Sue Gutierrez RN on 4.30.2021 at 0942. KVO   Culture negative monitoring continues Cultured on the 1st day of incubation:  Gram positive cocci in pairs and  chains  *  Critical Value/Significant Value, preliminary result only, called to and read back by  Shahbaz Hill, ADRIANA @ 0410 4/30/21 TM.    (Note)  POSITIVE for STREPTOCOCCUS SPECIES OTHER THAN pneumococcus, anginosus  group, S. pyogenes and S. agalactiae. Performed using [employer]  multiplex nucleic acid test. Final identification and antimicrobial  susceptibility testing will be verified by standard methods.    Specimen tested with Verigene multiplex, gram-positive blood culture  nucleic acid test for the following targets: Staph aureus, Staph  epidermidis, Staph lugdunensis, other Staph species, Enterococcus  faecalis, Enterococcus faecium, Streptococcus species, S. agalactiae,  S. anginosus grp., S. pneumoniae, S. pyogenes, Listeria sp., mecA  (methicillin resistance) and Fanny/B (vancomycin resistance).    Critical Value/Significant Value called to and read back by  Yousuf Traylor RN 4/30/21 @ 0649 TF   No growth No growth     Most Recent 6 glucoses:  Recent Labs   Lab Test 04/30/21  0455 04/29/21  0942 03/23/21  0744 05/16/18  0650 05/15/18  0817 05/14/18  0653   * 71 90 73 68* 80   ,   Results for orders placed or performed during the hospital encounter of 04/29/21   XR Chest Port 1 View    Narrative    CHEST ONE VIEW April 29, 2021 10:00 AM     HISTORY: Fever.    COMPARISON: None.      Impression    IMPRESSION: No acute disease.    KARIN GALLARDO MD   US Paracentesis    Narrative    US PARACENTESIS  4/29/2021 10:51 AM       HISTORY: High volume paracentesis with or without diagnostic fluid  analysis with labs to be drawn if ordered. Total paracentesis volume  as much as possible.    PROCEDURE:  Written informed consent was obtained from the patient  prior to the procedure. The risks and benefits including bleeding,  infection and abdominal organ injury were discussed and the patient  wished to continue. Initial ultrasound images demonstrated a large  right lower quadrant fluid collection. A permanent  image was saved.  The skin overlying this collection was marked, prepped, draped and  anesthetized in usual sterile fashion utilizing 10 mL of lidocaine 1%.  The paracentesis catheter was then placed into the peritoneal fluid  collection with return of 3500 mL of yellow fluid. Estimated blood  loss during the procedure was less than 5 mL. No specimens collected.  Patient tolerated the procedure well. The patient will receive  intravenous albumin on the usual sliding scale as needed per protocol.       With continuous ultrasound guidance, 8 French needle and catheter  advanced into the ascites and ultrasound image was stored for  documentation.      Impression    IMPRESSION:  Ultrasound-guided paracentesis.  3.5 L removed. Sample  sent to the lab for evaluation.      NICK WU MD   CT Abdomen Pelvis w/o Contrast    Narrative    CT ABDOMEN PELVIS WITHOUT CONTRAST 4/29/2021 3:29 PM    CLINICAL HISTORY: Hypotension, septic shock.   TECHNIQUE: CT scan of the abdomen and pelvis was performed without IV  contrast. Multiplanar reformats were obtained. Dose reduction  techniques were used.  CONTRAST: None.    COMPARISON: MRI from February 4, 2021    FINDINGS:   LOWER CHEST: Minimal bibasilar atelectasis vs. less likely infiltrate  right worse than left. Trace pleural fluid.    HEPATOBILIARY: Cirrhotic-appearing liver morphology. Distended  gallbladder similar to previous. No calcified gallstones. Biliary  stent noted.    PANCREAS: Grossly unremarkable.    SPLEEN: Splenomegaly at 14.5 cm. No focal lesions.    ADRENAL GLANDS: Somewhat thickened bilaterally. No discrete nodules.    KIDNEYS/BLADDER: No significant mass, stones, or hydronephrosis.    BOWEL: No obstruction or inflammatory change.    LYMPH NODES: No gross lymphadenopathy in the absence of contrast.    VASCULATURE: No abdominal aortic aneurysm.    PELVIC ORGANS: No pelvic masses.    OTHER: Small to moderate amount of ascites. No free air.    MUSCULOSKELETAL:  No suspicious bony lesions.      Impression    IMPRESSION:   1.  Small to moderate amount of ascites.  2.  Cirrhotic liver morphology and distended gallbladder similar to  previous. Biliary stent appears to extend from the left lobe to the  duodenum.  3.  No hydronephrosis or bowel obstruction demonstrated.    KARIN GALLARDO MD       Discharge Medications   Current Discharge Medication List      START taking these medications    Details   norepinephrine (LEVOPHED) 16-0.9 MG/250ML-% SOLN Inject 2.178-29.04 mcg/min into the vein continuous  Qty:      Associated Diagnoses: Bacterial sepsis (H)      piperacillin-tazobactam (ZOSYN) 3-0.375 GM/50ML infusion Inject 50 mLs (3.375 g) into the vein every 6 hours  Qty:      Associated Diagnoses: Bacterial sepsis (H)         STOP taking these medications       calcium carbonate 600 mg-vitamin D 400 units (CALTRATE) 600-400 MG-UNIT per tablet Comments:   Reason for Stopping:         furosemide (LASIX) 20 MG tablet Comments:   Reason for Stopping:         lisinopril (ZESTRIL) 10 MG tablet Comments:   Reason for Stopping:         mesalamine (LIALDA) 1.2 g EC tablet Comments:   Reason for Stopping:         multivitamin (CENTRUM SILVER) tablet Comments:   Reason for Stopping:         nadolol (CORGARD) 20 MG tablet Comments:   Reason for Stopping:         potassium chloride ER (MICRO-K) 10 MEQ CR capsule Comments:   Reason for Stopping:         simvastatin (ZOCOR) 20 MG tablet Comments:   Reason for Stopping:         spironolactone (ALDACTONE) 50 MG tablet Comments:   Reason for Stopping:         ursodiol (ACTIGALL) 300 MG capsule Comments:   Reason for Stopping:             Allergies   Allergies   Allergen Reactions     Diagnostic X[employer] Hives     PATIENT HAD HIVE REACTION AFTER ADMINISTERING CT CONTRAST DYE.       Contrast Dye

## 2021-04-30 NOTE — ANESTHESIA PREPROCEDURE EVALUATION
Anesthesia Pre-Procedure Evaluation    Patient: Berta Nava   MRN: [id number] : 1965        Preoperative Diagnosis: * No pre-op diagnosis entered *   Procedure : * No procedures listed *     Past Medical History:   Diagnosis Date     Ascites      Biliary cirrhosis (H)      Cholangitis, sclerosing      Cirrhosis of liver with ascites (H) 3/3/2021     Hearing loss of left ear     wears a hearing aide     History of low potassium      Hyperlipidemia      Hypertension      Sjogren's syndrome (H)      Ulcerative colitis (H)      Ulcerative pancolitis (H)       Past Surgical History:   Procedure Laterality Date     GYN SURGERY      bilat fallopian tubes and ovaries removed      Allergies   Allergen Reactions     Diagnostic X[employer] Hives     PATIENT HAD HIVE REACTION AFTER ADMINISTERING CT CONTRAST DYE.       Contrast Dye       Social History     Tobacco Use     Smoking status: Never Smoker     Smokeless tobacco: Never Used   Substance Use Topics     Alcohol use: No     Comment: Last drink was       Wt Readings from Last 1 Encounters:   21 72.6 kg (160 lb)              OUTSIDE LABS:  CBC:   Lab Results   Component Value Date    WBC 43.2 (H) 2021    WBC 15.2 (H) 2021    HGB 9.7 (L) 2021    HGB 10.7 (L) 2021    HCT 29.4 (L) 2021    HCT 33.6 (L) 2021     2021     (H) 2021     BMP:   Lab Results   Component Value Date     (L) 2021     2021    POTASSIUM 5.3 2021    POTASSIUM 3.8 2021    CHLORIDE 98 2021    CHLORIDE 105 2021    CO2 19 (L) 2021    CO2 23 2021    BUN 76 (H) 2021    BUN 14 2021    CR 2.28 (H) 2021    CR 0.64 2021    GLC 71 2021    GLC 90 2021     COAGS:   Lab Results   Component Value Date    INR 1.51 (H) 2021     POC:   Lab Results   Component Value Date     (H) 2021     HEPATIC:   Lab Results   Component Value  Date    ALBUMIN 1.5 (L) 04/29/2021    PROTTOTAL 7.0 04/29/2021     (H) 04/29/2021     (H) 04/29/2021    ALKPHOS 409 (H) 04/29/2021    BILITOTAL 3.2 (H) 04/29/2021     OTHER:   Lab Results   Component Value Date    LACT 1.7 04/29/2021    A1C 4.3 04/29/2021    MARIELENA 10.3 (H) 04/29/2021    PHOS 2.6 05/16/2018    MAG 2.7 (H) 05/14/2018    LIPASE 142 05/13/2018       Anesthesia Plan    ASA Status:  4     - Procedure: Procedure only, no anesthetic delivered               Techniques and Equipment:     - Lines/Monitors: Arterial Line     Consents            Postoperative Care            Comments:    Hypotension requiring pressor support in ICU            Asa Wadsworth, DO

## 2021-04-30 NOTE — PROGRESS NOTES
Increasing pressor requirements. On anbx.   - Will likely need an A-line.  - Start hydrocortisone 100mg Q8hr.    Gio Kent MD.  Pulmonary and Critical Care.  04/29/2021  8:43 PM

## 2021-04-30 NOTE — PROVIDER NOTIFICATION
DATE:  4/30/2021   TIME OF RECEIPT FROM LAB:  1207  LAB TEST:  Pro calcitonin   LAB VALUE: 5.41  RESULTS GIVEN WITH READ-BACK TO (PROVIDER):  Abdissa  TIME LAB VALUE REPORTED TO PROVIDER:   7223

## 2021-04-30 NOTE — PROGRESS NOTES
SPIRITUAL HEALTH SERVICES Progress Note  [facility] ICU    Spiritual Health Services consult order for assistance with healthcare directive.  Provided informational resources and education related to directive for pt and spouse, Demar.  Pt would like to review long form documents (values and goals page) with healthcare agent (Demar and daughter).  Wished only to complete short form and notarize today.  Advised Honoring Choices and emailed signed document for notarizing.    Plan: [facility] is available for additional consultation/assistance with healthcare directive.    Steven Gurrola MA  Staff   Pager: [phone]  Phone: [phone]

## 2021-04-30 NOTE — PROVIDER NOTIFICATION
DATE:  4/30/2021   TIME OF RECEIPT FROM LAB:  0657  LAB TEST:  Blood culture  LAB VALUE:  Strep species  RESULTS GIVEN WITH READ-BACK TO (PROVIDER):  Tele ICU  TIME LAB VALUE REPORTED TO PROVIDER:   0658

## 2021-04-30 NOTE — PROGRESS NOTES
Pt was hypotensive late last night/evening.  - Started IV Vasopressin.  - Steroids for Sepsis.  - 500ml of 5% albumin x1.    Gio Kent MD.  Pulmonary and Critical Care.  04/30/2021  6:49 AM

## 2021-04-30 NOTE — PROGRESS NOTES
Spoke with bedside RN Cindy.  Patient is showing some improvement with decreasing pressor requirements.  A&O x 4, on room air.  + blood cx, on antbx coverage.    Hepatology team would like to initiate transfer to [facility] for ongoing transplant evaluation.    Intensivist at [facility] will speak directly with admissions and Dr. Leventhal for ongoing plan of care.

## 2021-04-30 NOTE — PLAN OF CARE
ICU End of Shift Summary.  For vital signs and complete assessments, please see documentation flowsheets.     Pertinent assessments: Pt alert and oriented, slow to respond initially but became more responsive as shift progressed.  Pt tele SR, BP soft intermittently and required initiation of second pressor, able to wean levophed throughout shift.  Pt stating that she feels she might be able to try more food than she has been in mood for last few weeks, may benefit from nutrition consult to discuss different protein snack options.  Pt able to urinate with purewick, no bowel movement this shift.  Major Shift Events: As above, positive blood cultures  Plan (Upcoming Events): Continue to monitor blood, treat with antibiotics  Discharge/Transfer Needs: Continue ICU cares while on pressors    Bedside Shift Report Completed : y  Bedside Safety Check Completed:y

## 2021-04-30 NOTE — CONSULTS
GASTROENTEROLOGY CONSULTATION      Berta Nava  [address] [facility] [id number]  55 year old female     Admission Date/Time: 4/29/2021  Primary Care Provider: [facility], [place]     We were asked to see the patient in consultation by Dr. Maher for evaluation of spontaneous bacterial peritionitis and PSC, liver cirrhosis.    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 Leventhal at [facility], admitted with symptoms of intermittent fevers, abdominal distension, weakness and found to be in septic shock and acute kidney injury.     Patient reports that over the last week, she has had increasing abdominal distension but no abdominal pain, generalized malaise/weakness, and intermittent fever x 2 last week. Reports temp at night on 2 consecutive days, 102F, and 101F. Resolved with tylenol. Denies nausea, vomiting, hematemesis, hematochezia, melena, or confusion. On furosemide/spironolactone, ursodiol outpatient and reports compliance.     Patient presented for routine paracentesis yesterday and was noted to be hypotensive with blood pressure in the 60s systolic. She was sent to the ER and workup was notable for leukocytosis with WBC 43.2, stable bilirubin at 3.2, mild increase in ALT/AST from baseline at 143/244 respectively, alk phos also stable 409. Creatinine previously normal, up to 2.28. Blood cultures positive for strep.     Paracentesis with 3.5L yellow fluid removed with IV albumin. Cell count consistent with SBP. UA negative. CT abd/pelvis without IV contrast showed biliary stent in place, no mention of biliary  dilation, cirrhotic liver, with stable/distended gall bladder, small to moderate ascites, splenomegaly.       Today, she is requiring less pressor support and leukocytosis is improving with IV Zosyn.     She typically follows with Dr. Tapia and Dr. London with [facility], but in March started evaluation with Dr. Leventhal at [facility] for liver transplant. She is undergoing evaluation but has not yet been listed. According to his note, patient last had ERCP 1/2021 showing partially occluded stent with choledocholithasis and extensive pus throught he duct system and new stent was placed.     She has a history of ulcerative colitis that appears to be stable on mesalamine with colonoscopy 12/2020 showing she was in remission.        PAST MEDICAL HISTORY:  Patient Active Problem List    Diagnosis Date Noted     Sepsis (H) 04/29/2021     Priority: Medium     Cirrhosis of liver with ascites (H) 03/03/2021     Priority: Medium     PSC (primary sclerosing cholangitis) 06/12/2019     Priority: Medium     Ulcerative pancolitis with complication (H) 06/12/2019     Priority: Medium     Unsatisfactory cervical Papanicolaou smear 05/04/2019     Priority: Medium     CCSM Review:    History:    3/2019: Pap Unsat, HPV neg  8/2020: NILM, HPV neg    Plan, per ASCCP guidelines: Repeat co-testing in 5 years (08/2025)       Cholangitis 05/13/2018     Priority: Medium     Hypokalemia 05/13/2018     Priority: Medium     Chronic fatigue 12/11/2017     Priority: Medium     Sicca syndrome (H) 12/11/2017     Priority: Medium     Hyperlipidemia 02/23/2012     Priority: Medium     Mixed hearing loss, bilateral 01/07/2009     Priority: Medium     Neoplasm of uncertain behavior of skin 01/26/2007     Priority: Medium     Overview:   LW Modifier:  moderate DN- left buttock  ; Dysplastic Nevus  Overview:   LW Modifier:  recurrent- right upper back  ; Dysplastic Nevus       Essential hypertension 06/07/2003     Priority: Medium     Overview:    Hypertension            ROS: A comprehensive ten point review of systems was negative aside from those in mentioned in the HPI.       MEDICATIONS:   Prior to Admission medications    Medication Sig Start Date End Date Taking? Authorizing Provider   calcium carbonate 600 mg-vitamin D 400 units (CALTRATE) 600-400 MG-UNIT per tablet Take 1 tablet by mouth daily  1/1/17  Yes Reported, Patient   furosemide (LASIX) 20 MG tablet Take 1 tablet (20 mg) by mouth daily 3/23/21  Yes Leventhal, Thomas Michael, MD   lisinopril (ZESTRIL) 10 MG tablet Take 10 mg by mouth daily  1/2/19  Yes Reported, Patient   mesalamine (LIALDA) 1.2 g EC tablet Take 2 tablets (2,400 mg) by mouth daily 3/23/21  Yes Leventhal, Thomas Michael, MD   multivitamin (CENTRUM SILVER) tablet Take 1 tablet by mouth daily  1/1/17  Yes Reported, Patient   nadolol (CORGARD) 20 MG tablet Take 3 tablets (60 mg) by mouth daily 3/23/21  Yes Leventhal, Thomas Michael, MD   potassium chloride ER (MICRO-K) 10 MEQ CR capsule Take 10 mEq by mouth At Bedtime  6/1/18  Yes Reported, Patient   simvastatin (ZOCOR) 20 MG tablet Take 20 mg by mouth At Bedtime   Yes Unknown, Entered By History   spironolactone (ALDACTONE) 50 MG tablet Take 1 tablet (50 mg) by mouth daily 3/23/21  Yes Leventhal, Thomas Michael, MD   ursodiol (ACTIGALL) 300 MG capsule Take 1 capsule (300 mg) by mouth 2 times daily 3/23/21  Yes Leventhal, Thomas Michael, MD        ALLERGIES:   Allergies   Allergen Reactions     Diagnostic X-Ray Materials Hives     PATIENT HAD HIVE REACTION AFTER ADMINISTERING CT CONTRAST DYE.       Contrast Dye         SOCIAL HISTORY:  Social History     Tobacco Use     Smoking status: Never Smoker     Smokeless tobacco: Never Used   Substance Use Topics     Alcohol use: No     Comment: Last drink was 2017     Drug use: No        FAMILY HISTORY:  No family history on file.     PHYSICAL EXAM:   BP (!) 88/48   Pulse 75   Temp 97.6  F (36.4  C) (Oral)   Resp 28   Ht 1.651 m (5'  "5\")   Wt 72.5 kg (159 lb 13.3 oz)   SpO2 100%   BMI 26.60 kg/m       PHYSICAL EXAM:  General: alert, oriented, NAD  SKIN: no suspicious lesions, rashes, +mild jaundice, or spider angiomas  HEAD: Normocephalic. No masses, lesions, tenderness or abnormalities  NECK: Neck supple. No adenopathy. Thyroid symmetric, normal size.  EYES: No scleral icterus  ENT: ENT exam normal, no neck nodes or sinus tenderness  RESPIRATORY: negative, Good diaphragmatic excursion. Lungs clear  CARDIOVASCULAR: negative, PMI normal. No lifts, heaves, or thrills. RRR. No murmurs, clicks gallops or rub  GASTROINTESTINAL: +BS, soft, NT, ND, no HSM, no masses/guarding/rebound  JOINT/EXTREMITIES: extremities normal- no gross deformities noted, gait normal and normal muscle tone  NEURO: Reflexes grossly normal and symmetric. Sensation grossly WNL.  PSYCH: no abnormal anxiety/depression  LYMPH: No anterior cervical, posterior cervical, or supraclavicular adenopathy     LABS:  I reviewed the patient's new clinical lab test results.   Recent Labs   Lab Test 04/30/21  0455 04/29/21  0942 04/29/21  0815 03/23/21  0744 05/13/18  0935 05/13/18  0935   WBC 36.8* 43.2*  --  15.2*   < > 22.2*   HGB 9.7* 9.7*  --  10.7*   < > 12.6   MCV 90 90  --  88   < > 84    427 459* 483*   < > 387   INR  --   --  1.51* 1.39*  --  1.22*    < > = values in this interval not displayed.     Recent Labs   Lab Test 04/30/21  0455 04/29/21  0942 03/23/21  0744   * 127* 136   POTASSIUM 4.7 5.3 3.8   CHLORIDE 102 98 105   CO2 17* 19* 23   BUN 73* 76* 14   ANIONGAP 10 10 8   MARIELENA 9.3 10.3* 9.3     Recent Labs   Lab Test 04/30/21  0455 04/29/21  1106 04/29/21  0942 03/23/21  0744 05/16/18  0650 05/13/18  0935 05/13/18  0935   ALBUMIN  --   --  1.5* 2.0* 1.6*   < > 2.1*   BILITOTAL 3.3*  --  3.2* 3.9* 2.0*   < > 3.4*   *  --  143* 51* 71*   < > 83*   *  --  244* 75* 92*   < > 119*   ALKPHOS 387*  --  409* 494* 344*   < > 441*   PROTEIN  --  50*  --   -- "   --   --   --    LIPASE  --   --   --   --   --   --  142    < > = values in this interval not displayed.        IMAGING  I personally reviewed the patient's new imaging results.  CT ABDOMEN PELVIS WITHOUT CONTRAST 4/29/2021 3:29 PM     CLINICAL HISTORY: Hypotension, septic shock.   TECHNIQUE: CT scan of the abdomen and pelvis was performed without IV  contrast. Multiplanar reformats were obtained. Dose reduction  techniques were used.  CONTRAST: None.     COMPARISON: MRI from February 4, 2021     FINDINGS:   LOWER CHEST: Minimal bibasilar atelectasis vs. less likely infiltrate  right worse than left. Trace pleural fluid.     HEPATOBILIARY: Cirrhotic-appearing liver morphology. Distended  gallbladder similar to previous. No calcified gallstones. Biliary  stent noted.     PANCREAS: Grossly unremarkable.     SPLEEN: Splenomegaly at 14.5 cm. No focal lesions.     ADRENAL GLANDS: Somewhat thickened bilaterally. No discrete nodules.     KIDNEYS/BLADDER: No significant mass, stones, or hydronephrosis.     BOWEL: No obstruction or inflammatory change.     LYMPH NODES: No gross lymphadenopathy in the absence of contrast.     VASCULATURE: No abdominal aortic aneurysm.     PELVIC ORGANS: No pelvic masses.     OTHER: Small to moderate amount of ascites. No free air.     MUSCULOSKELETAL: No suspicious bony lesions.                                                                      IMPRESSION:   1.  Small to moderate amount of ascites.  2.  Cirrhotic liver morphology and distended gallbladder similar to  previous. Biliary stent appears to extend from the left lobe to the  duodenum.  3.  No hydronephrosis or bowel obstruction demonstrated.     CONSULTATION ASSESSMENT AND PLAN:    55 year old female with complicated past medical history including primary sclerosing cholangitis with associated liver cirrhosis complicated by diuretic refractory ascites requiring frequent paracentesis, esophageal varices, s/p biliary stent  placement, pancreatic mucinous cystadenoma, and ulcerative colitis, currently undergoing liver transplant evaluation through Dr. Leventhal at [facility], admitted with symptoms of intermittent fevers, abdominal distension, weakness and found to be in septic shock and acute kidney injury, and evidence of SBP.     1. PSC/liver cirrhosis, +SBP. Followed primarily by [facility]. Paracentesis positive for SBP and suspect this is the likely source of septic shock. She has history of cholangitis but stent was exchanged fairly recently in January and appears to be in position on imaging without any mention of biliary dilation. Labs also showing stable bilirubin with only mild elevation in LFTs from baseline which would make this less likely. She is denying any abdominal pain prior to admission or currently. Clinically showing some improvements this morning. Blood cultures recently noted to be positive for strep. No fevers.     No signs of GI bleeding. No history of encephalopathy. Was slightly confused initially but this has cleared.      --Will review with biliary staff.   --At this point, it appears patient is stable. However, if there are signs of clinical deterioration would have low threshold to transfer to [facility].   --Monitor LFTs/bilirubin, creatinine.   --ID is being consulted. Continue current antibiotics.     Reviewed with Dr. Shafer.     Thank you for asking us to participate in the care of this patient.    Sharlene Burris, [facility] ([facility])        Addendum: Reviewed with Dr. Tobias (biliary staff). No plans for stent exchange at this time. Recommend monitoring for now. If due for stent exchange, this could be considered for Monday. However, if clinically improving this may not be necessary.

## 2021-04-30 NOTE — CONSULTS
ID consult dictated IMP 1 56 yo female sclerosing cholangitis, sepsis/fever, strep bacteremia, ? Biliary vs SBP    REC zosyn , adjust to cxs, looks like U transfer plan

## 2021-04-30 NOTE — PROGRESS NOTES
Now off vasopressin and less norepinephrine needs. 625 urinary output since midnight.     GI at  [facility] has been contacted and they request transfer but bed not available so will continue care here.

## 2021-05-01 LAB
ALBUMIN SERPL-MCNC: 1.5 G/DL (ref 3.4–5)
ALP SERPL-CCNC: 472 U/L (ref 40–150)
ALT SERPL W P-5'-P-CCNC: 117 U/L (ref 0–50)
ANION GAP SERPL CALCULATED.3IONS-SCNC: 7 MMOL/L (ref 3–14)
AST SERPL W P-5'-P-CCNC: 181 U/L (ref 0–45)
BASOPHILS # BLD AUTO: 0 10E9/L (ref 0–0.2)
BASOPHILS NFR BLD AUTO: 0.1 %
BILIRUB SERPL-MCNC: 2.1 MG/DL (ref 0.2–1.3)
BUN SERPL-MCNC: 44 MG/DL (ref 7–30)
CALCIUM SERPL-MCNC: 9 MG/DL (ref 8.5–10.1)
CHLORIDE SERPL-SCNC: 115 MMOL/L (ref 94–109)
CO2 SERPL-SCNC: 18 MMOL/L (ref 20–32)
CREAT SERPL-MCNC: 0.89 MG/DL (ref 0.52–1.04)
DIFFERENTIAL METHOD BLD: ABNORMAL
EOSINOPHIL # BLD AUTO: 0 10E9/L (ref 0–0.7)
EOSINOPHIL NFR BLD AUTO: 0 %
ERYTHROCYTE [DISTWIDTH] IN BLOOD BY AUTOMATED COUNT: 15.3 % (ref 10–15)
GFR SERPL CREATININE-BSD FRML MDRD: 72 ML/MIN/{1.73_M2}
GLUCOSE BLDC GLUCOMTR-MCNC: 120 MG/DL (ref 70–99)
GLUCOSE BLDC GLUCOMTR-MCNC: 128 MG/DL (ref 70–99)
GLUCOSE BLDC GLUCOMTR-MCNC: 133 MG/DL (ref 70–99)
GLUCOSE BLDC GLUCOMTR-MCNC: 137 MG/DL (ref 70–99)
GLUCOSE BLDC GLUCOMTR-MCNC: 145 MG/DL (ref 70–99)
GLUCOSE BLDC GLUCOMTR-MCNC: 160 MG/DL (ref 70–99)
GLUCOSE SERPL-MCNC: 126 MG/DL (ref 70–99)
HCT VFR BLD AUTO: 27.5 % (ref 35–47)
HGB BLD-MCNC: 9.3 G/DL (ref 11.7–15.7)
IMM GRANULOCYTES # BLD: 0.3 10E9/L (ref 0–0.4)
IMM GRANULOCYTES NFR BLD: 1.2 %
INR PPP: 1.6 (ref 0.86–1.14)
LYMPHOCYTES # BLD AUTO: 1.2 10E9/L (ref 0.8–5.3)
LYMPHOCYTES NFR BLD AUTO: 4.4 %
MCH RBC QN AUTO: 30.3 PG (ref 26.5–33)
MCHC RBC AUTO-ENTMCNC: 33.8 G/DL (ref 31.5–36.5)
MCV RBC AUTO: 90 FL (ref 78–100)
MONOCYTES # BLD AUTO: 1 10E9/L (ref 0–1.3)
MONOCYTES NFR BLD AUTO: 3.6 %
NEUTROPHILS # BLD AUTO: 25.2 10E9/L (ref 1.6–8.3)
NEUTROPHILS NFR BLD AUTO: 90.7 %
NRBC # BLD AUTO: 0 10*3/UL
NRBC BLD AUTO-RTO: 0 /100
PLATELET # BLD AUTO: 328 10E9/L (ref 150–450)
POTASSIUM SERPL-SCNC: 3.8 MMOL/L (ref 3.4–5.3)
PROT SERPL-MCNC: 6.1 G/DL (ref 6.8–8.8)
RBC # BLD AUTO: 3.07 10E12/L (ref 3.8–5.2)
SODIUM SERPL-SCNC: 140 MMOL/L (ref 133–144)
WBC # BLD AUTO: 27.8 10E9/L (ref 4–11)

## 2021-05-01 PROCEDURE — 258N000003 HC RX IP 258 OP 636: Performed by: INTERNAL MEDICINE

## 2021-05-01 PROCEDURE — 85025 COMPLETE CBC W/AUTO DIFF WBC: CPT | Performed by: INTERNAL MEDICINE

## 2021-05-01 PROCEDURE — 99233 SBSQ HOSP IP/OBS HIGH 50: CPT | Performed by: INTERNAL MEDICINE

## 2021-05-01 PROCEDURE — 80053 COMPREHEN METABOLIC PANEL: CPT | Performed by: INTERNAL MEDICINE

## 2021-05-01 PROCEDURE — 99207 PR NO BILLABLE SERVICE THIS VISIT: CPT | Performed by: INTERNAL MEDICINE

## 2021-05-01 PROCEDURE — 85610 PROTHROMBIN TIME: CPT | Performed by: INTERNAL MEDICINE

## 2021-05-01 PROCEDURE — 999N001017 HC STATISTIC GLUCOSE BY METER IP

## 2021-05-01 PROCEDURE — 87040 BLOOD CULTURE FOR BACTERIA: CPT | Performed by: INTERNAL MEDICINE

## 2021-05-01 PROCEDURE — 250N000011 HC RX IP 250 OP 636: Performed by: INTERNAL MEDICINE

## 2021-05-01 PROCEDURE — 200N000001 HC R&B ICU

## 2021-05-01 RX ADMIN — TAZOBACTAM SODIUM AND PIPERACILLIN SODIUM 3.38 G: 375; 3 INJECTION, SOLUTION INTRAVENOUS at 20:30

## 2021-05-01 RX ADMIN — HYDROCORTISONE SODIUM SUCCINATE 50 MG: 100 INJECTION, POWDER, FOR SOLUTION INTRAMUSCULAR; INTRAVENOUS at 13:07

## 2021-05-01 RX ADMIN — TAZOBACTAM SODIUM AND PIPERACILLIN SODIUM 3.38 G: 375; 3 INJECTION, SOLUTION INTRAVENOUS at 08:46

## 2021-05-01 RX ADMIN — HYDROCORTISONE SODIUM SUCCINATE 50 MG: 100 INJECTION, POWDER, FOR SOLUTION INTRAMUSCULAR; INTRAVENOUS at 04:06

## 2021-05-01 RX ADMIN — TAZOBACTAM SODIUM AND PIPERACILLIN SODIUM 3.38 G: 375; 3 INJECTION, SOLUTION INTRAVENOUS at 13:08

## 2021-05-01 RX ADMIN — SODIUM CHLORIDE: 9 INJECTION, SOLUTION INTRAVENOUS at 07:47

## 2021-05-01 RX ADMIN — HYDROCORTISONE SODIUM SUCCINATE 50 MG: 100 INJECTION, POWDER, FOR SOLUTION INTRAMUSCULAR; INTRAVENOUS at 20:30

## 2021-05-01 RX ADMIN — TAZOBACTAM SODIUM AND PIPERACILLIN SODIUM 3.38 G: 375; 3 INJECTION, SOLUTION INTRAVENOUS at 01:55

## 2021-05-01 RX ADMIN — SODIUM CHLORIDE: 9 INJECTION, SOLUTION INTRAVENOUS at 18:45

## 2021-05-01 ASSESSMENT — ACTIVITIES OF DAILY LIVING (ADL)
ADLS_ACUITY_SCORE: 15

## 2021-05-01 NOTE — PLAN OF CARE
ICU End of Shift Summary.  For vital signs and complete assessments, please see documentation flowsheets.     Pertinent assessments: Patient alert and oriented. Tolerating diet. Adequate urine output.   Major Shift Events: pressors off. Patient stood at bedside and marched in place.  Plan (Upcoming Events): continue to monitor  Discharge/Transfer Needs: [facility].    Bedside Shift Report Completed : yes  Bedside Safety Check Completed:yes    Yamile Francisco RN

## 2021-05-01 NOTE — PROGRESS NOTES
INFECTIOUS DISEASES CHART CHECK PROGRESS NOTE    Assessment:  Patient with PMH UC and sclerosing cholangitis with cirrhosis/ascites on transplant list, history of biliary stenting admitted 4/29 from scheduled paracentesis with hypotension requiring ICU admission/pressors, found to have Strep mitis bacteremia likely due to SBP (1K neutrophils), less likely biliary. Plan was for transfer to  given possible need for stenting, however with improving clinical status on zosyn and no beds there may stay. Continue zosyn for now, anticipate can likely transition to ceftri in coming days.     Recommendations:  -Continue zosyn for now.    ID will continue to follow this patient.   Blanca Skaggs MD    ------------------------------------------------------------------------------------------------------------------------------------------------------------------  Subjective/Interval Events:  -Afeb  -Off NE  -On RA  -WBC down to 27.8  -Strep identified as Strep mitis  -Ascites culture ngtd  -Bili down, AST/ALT stable  -Plan was for discharge to , however beds not available and may stay as pressures improving  Chart checked    Laboratory Data:  Creatinine   Date Value Ref Range Status   05/01/2021 0.89 0.52 - 1.04 mg/dL Final   04/30/2021 1.20 (H) 0.52 - 1.04 mg/dL Final   04/30/2021 1.69 (H) 0.52 - 1.04 mg/dL Final   04/29/2021 2.28 (H) 0.52 - 1.04 mg/dL Final   03/23/2021 0.64 0.52 - 1.04 mg/dL Final     WBC   Date Value Ref Range Status   05/01/2021 27.8 (H) 4.0 - 11.0 10e9/L Final   04/30/2021 36.8 (H) 4.0 - 11.0 10e9/L Final   04/29/2021 43.2 (H) 4.0 - 11.0 10e9/L Final   03/23/2021 15.2 (H) 4.0 - 11.0 10e9/L Final   05/16/2018 10.2 4.0 - 11.0 10e9/L Final     Hemoglobin   Date Value Ref Range Status   05/01/2021 9.3 (L) 11.7 - 15.7 g/dL Final     Platelet Count   Date Value Ref Range Status   05/01/2021 328 150 - 450 10e9/L Final     Lab Results   Component Value Date     05/01/2021    BUN 44 (H) 05/01/2021     CO2 18 (L) 05/01/2021     No results found for: CRP     Micro:  Recent Labs   Lab 05/01/21  0855 04/29/21  1106 04/29/21  1027 04/29/21  0942   CULT No growth after 4 hours Cultured on the 1st day of incubation:  Streptococcus mitis group  Speciation in progress  *  Critical Value/Significant Value, preliminary result only, called to and read back by  Sue Gutierrez RN on 4.30.2021 at 0942. KVO    Susceptibility testing done on previous specimen  No growth Culture negative monitoring continues Cultured on the 1st day of incubation:  Streptococcus mitis group  Speciation in progress  *  Critical Value/Significant Value, preliminary result only, called to and read back by  Shahbaz Hill RN @ 0410 4/30/21 TM.    Susceptibility testing in progress  (Note)  POSITIVE for STREPTOCOCCUS SPECIES OTHER THAN pneumococcus, anginosus  group, S. pyogenes and S. agalactiae. Performed using [employer]  multiplex nucleic acid test. Final identification and antimicrobial  susceptibility testing will be verified by standard methods.    Specimen tested with Verigene multiplex, gram-positive blood culture  nucleic acid test for the following targets: Staph aureus, Staph  epidermidis, Staph lugdunensis, other Staph species, Enterococcus  faecalis, Enterococcus faecium, Streptococcus species, S. agalactiae,  S. anginosus grp., S. pneumoniae, S. pyogenes, Listeria sp., mecA  (methicillin resistance) and Fanny/B (vancomycin resistance).    Critical Value/Significant Value called to and read back by  Yousuf Traylor RN 4/30/21 @ 0649 TF     SDES Blood Left Arterial line Blood Right Arm  Catheterized Urine Ascites  Ascites Blood Right Arm       Imaging:  No results found for this or any previous visit (from the past 48 hour(s)).

## 2021-05-01 NOTE — PLAN OF CARE
ICU End of Shift Summary.  For vital signs and complete assessments, please see documentation flowsheets.     Pertinent assessments: A&Ox4, Afebrile, no complaints of pain. Tele SR, Pressures soft but MAP sustained above goal (60<) with Levo overnight. Maintaining 90+% saturations on RA, LS clear. Abdomen rounded, ascites, jaundice to sclera. No BM, voiding with adequate UO via Purewick.   Major Shift Events:   - Levo paused @ ~0515, was not tolerated.  ( MAP goal 60<)  - Levo paused again at 0630. Pt continues to tolerate at this time. - if not tolerated further into shift Pt may need low dose pressor to bridge off pressor therapy.   - Labs: WBC remain elevated but trending down, Chloride continues to rise, Liver enzymes remain elevated, INR elevated (see results tab)  Plan (Upcoming Events): Pt to transfer to [facility] when a bed opens up (non-emergent transfer) Pt is also on Liver transplant list.   Discharge/Transfer Needs: Transfer to [facility] when bed is open.     Bedside Shift Report Completed :   Bedside Safety Check Completed:

## 2021-05-01 NOTE — DISCHARGE SUMMARY
[facility]  Hospitalist Discharge Summary      Date of Admission:  4/29/2021  Date of Discharge:  4/30/2021  Discharging Provider: Vern Leblanc MD      Discharge Diagnoses       Septic shock    Streptococcus bacteremia    Spontaneous bacterial peritonitis     Suspected hepatobiliary sepsis    Chronic liver disease with ascites requiring periodic paracentesis    Acute kidney injury    Follow-ups Needed After Discharge   Follow-up Appointments     Follow Up and recommended labs and tests      Transfer to [facility] ICU team, consultation   with hepatologist Dr. Leventhal             Unresulted Labs Ordered in the Past 30 Days of this Admission     Date and Time Order Name Status Description    5/1/2021 0200 Blood culture In process     4/29/2021 0951 Fluid Culture Aerobic Bacterial Preliminary     4/29/2021 0949 Blood culture Preliminary     4/29/2021 0949 Blood culture Preliminary       These results will be followed up by [facility] ICU team    Discharge Disposition   Transferred to [facility] ICU  Condition at discharge: Stable      Hospital Course      Patient is a 55-year-old lady with a history of primary sclerosing cholangitis.  She has a history of recurrent ascites and undergoes paracentesis on a regular basis.    She went get another paracentesis done  and was found to have a very low blood pressure in the 60s.  So she was sent to the [facility]  emergency room.  Patient also gives a history of low-grade fever at home.  But she denied any significant abdominal pain.     Her work-up in the emergency room showed significant leukocytosis of around 43,000, acute kidney injury, elevated LFTs.     She underwent paracentesis by IR in the emergency room.  The fluid analysis showed evidence of SBP based on cell counts.     Patient was started on IV Zosyn and she was admitted to [facility] ICU         Patient was  admitted to intensive care unit and closely monitored with IV antibiotic and norepinephrine support.  Blood culture obtained earlier grew Streptococcus species and culture and sensitivities pending.    Hypotension persisted and patient required albumin and hydrocortisone as well. Gastroenterology as well as intensive care medicine physician were consulted.    Patient was seen by gastroenterology team and I discussed with Dr. Shafer who recommended to transfer the patient to [facility] given patient's complex care requirement including  possible hepatobiliary intervention and evaluation for liver transplant.    Dr. Leventhal from [facility] hepatology unit was also contacted and he agreed with  transferring this patient to the [facility] ICU team for further care.  Dr. Leventhal will be consulting after admission to [facility].  Dr Perlman of ICU team at [facility] accepted patient to his care when bed is available.    On the day of transfer, patient was alert and oriented x3.  Patient has history of blood pressure 90s on a low-dose norepinephrine drip.     Patient remained at [facility] ICU due to lack of beds at the [facility].  She remained in the ICU on Levophed for hypotension.    She can be transferred to [facility] ICU for close monitoring and treatment as planned yesterday.  Patient is not stable for medical floor due to tenuous blood pressure and septic shock and risk for deterioration.      Consultations This Hospital Stay   GASTROENTEROLOGY IP CONSULT  NEPHROLOGY IP CONSULT  ADVANCE DIRECTIVE IP CONSULT  INFECTIOUS DISEASES IP CONSULT  NUTRITION SERVICES ADULT IP CONSULT    Code Status   Full Code    Time Spent on this Encounter   IVern MD, personally saw the patient today and spent greater than 30 minutes discharging this patient.       Vern Leblanc MD  [facility]  [facility] [employer] [facility] ICU  [address] [facility]  [place] [address]  Phone: [phone]  Fax: [phone]  ______________________________________________________________________    Physical Exam   Vital Signs: Temp: 98.2  F (36.8  C) Temp src: Axillary  Pulse: 57   Resp: 20 SpO2: 98 % O2 Device: None (Room air)    Weight: 159 lbs 13.34 oz         Primary Care Physician   [facility]    Discharge Orders      Reason for your hospital stay    Severe sepsis with septic shock     Intake and output    Every shift     Activity - Up ad indy     Follow Up and recommended labs and tests    Transfer to [facility] ICU team, consultation with hepatologist Dr. Leventhal     Full Code     Advance Diet as Tolerated    Follow this diet upon discharge: Advance to a regular diet as tolerated       Significant Results and Procedures   Most Recent 3 CBC's:  Recent Labs   Lab Test 05/01/21 0420 04/30/21  0455 04/29/21  0942   WBC 27.8* 36.8* 43.2*   HGB 9.3* 9.7* 9.7*   MCV 90 90 90    434 427     Most Recent 3 BMP's:  Recent Labs   Lab Test 05/01/21  0420 04/30/21  1630 04/30/21  0455    136 129*   POTASSIUM 3.8 3.8 4.7   CHLORIDE 115* 110* 102   CO2 18* 17* 17*   BUN 44* 58* 73*   CR 0.89 1.20* 1.69*   ANIONGAP 7 9 10   MARIELENA 9.0 8.8 9.3   * 161* 196*     Most Recent 2 LFT's:  Recent Labs   Lab Test 05/01/21  0420 04/30/21  0455   * 198*   * 128*   ALKPHOS 472* 387*   BILITOTAL 2.1* 3.3*     Most Recent 3 INR's:  Recent Labs   Lab Test 05/01/21  0420 04/29/21  0815 03/23/21  0744   INR 1.60* 1.51* 1.39*     Most Recent 3 Troponin's:No lab results found.  Most Recent 3 BNP's:No lab results found.  Most Recent D-dimer:No lab results found.  Most Recent 6 Bacteria Isolates From Any Culture (See EPIC Reports for Culture Details):  Recent Labs   Lab Test 04/29/21  1106 04/29/21  1027 04/29/21  0942 05/13/18  1145 05/13/18  1143   CULT No growth  Cultured on the 1st day of  incubation:  Gram positive cocci in pairs and chains  *  Critical Value/Significant Value, preliminary result only, called to and read back by  Sue Gutierrez RN on 4.30.2021 at 0942. KVO   Culture negative monitoring continues Cultured on the 1st day of incubation:  Gram positive cocci in pairs and chains  *  Critical Value/Significant Value, preliminary result only, called to and read back by  Shahbaz Hill RN @ 0410 4/30/21 TM.    (Note)  POSITIVE for STREPTOCOCCUS SPECIES OTHER THAN pneumococcus, anginosus  group, S. pyogenes and S. agalactiae. Performed using Allurent  multiplex nucleic acid test. Final identification and antimicrobial  susceptibility testing will be verified by standard methods.    Specimen tested with Verigene multiplex, gram-positive blood culture  nucleic acid test for the following targets: Staph aureus, Staph  epidermidis, Staph lugdunensis, other Staph species, Enterococcus  faecalis, Enterococcus faecium, Streptococcus species, S. agalactiae,  S. anginosus grp., S. pneumoniae, S. pyogenes, Listeria sp., mecA  (methicillin resistance) and Fanny/B (vancomycin resistance).    Critical Value/Significant Value called to and read back by  Yousuf Traylor RN 4/30/21 @ 0649 TF   No growth No growth     Most Recent 6 glucoses:  Recent Labs   Lab Test 05/01/21  0420 04/30/21  1630 04/30/21  0455 04/29/21  0942 03/23/21  0744 05/16/18  0650   * 161* 196* 71 90 73   ,   Results for orders placed or performed during the hospital encounter of 04/29/21   XR Chest Port 1 View    Narrative    CHEST ONE VIEW April 29, 2021 10:00 AM     HISTORY: Fever.    COMPARISON: None.      Impression    IMPRESSION: No acute disease.    KARIN GALLARDO MD   US Paracentesis    Narrative    US PARACENTESIS  4/29/2021 10:51 AM       HISTORY: High volume paracentesis with or without diagnostic fluid  analysis with labs to be drawn if ordered. Total paracentesis volume  as much as possible.    PROCEDURE:  Written  informed consent was obtained from the patient  prior to the procedure. The risks and benefits including bleeding,  infection and abdominal organ injury were discussed and the patient  wished to continue. Initial ultrasound images demonstrated a large  right lower quadrant fluid collection. A permanent image was saved.  The skin overlying this collection was marked, prepped, draped and  anesthetized in usual sterile fashion utilizing 10 mL of lidocaine 1%.  The paracentesis catheter was then placed into the peritoneal fluid  collection with return of 3500 mL of yellow fluid. Estimated blood  loss during the procedure was less than 5 mL. No specimens collected.  Patient tolerated the procedure well. The patient will receive  intravenous albumin on the usual sliding scale as needed per protocol.       With continuous ultrasound guidance, 8 French needle and catheter  advanced into the ascites and ultrasound image was stored for  documentation.      Impression    IMPRESSION:  Ultrasound-guided paracentesis.  3.5 L removed. Sample  sent to the lab for evaluation.      NICK WU MD   CT Abdomen Pelvis w/o Contrast    Narrative    CT ABDOMEN PELVIS WITHOUT CONTRAST 4/29/2021 3:29 PM    CLINICAL HISTORY: Hypotension, septic shock.   TECHNIQUE: CT scan of the abdomen and pelvis was performed without IV  contrast. Multiplanar reformats were obtained. Dose reduction  techniques were used.  CONTRAST: None.    COMPARISON: MRI from February 4, 2021    FINDINGS:   LOWER CHEST: Minimal bibasilar atelectasis vs. less likely infiltrate  right worse than left. Trace pleural fluid.    HEPATOBILIARY: Cirrhotic-appearing liver morphology. Distended  gallbladder similar to previous. No calcified gallstones. Biliary  stent noted.    PANCREAS: Grossly unremarkable.    SPLEEN: Splenomegaly at 14.5 cm. No focal lesions.    ADRENAL GLANDS: Somewhat thickened bilaterally. No discrete nodules.    KIDNEYS/BLADDER: No significant mass,  stones, or hydronephrosis.    BOWEL: No obstruction or inflammatory change.    LYMPH NODES: No gross lymphadenopathy in the absence of contrast.    VASCULATURE: No abdominal aortic aneurysm.    PELVIC ORGANS: No pelvic masses.    OTHER: Small to moderate amount of ascites. No free air.    MUSCULOSKELETAL: No suspicious bony lesions.      Impression    IMPRESSION:   1.  Small to moderate amount of ascites.  2.  Cirrhotic liver morphology and distended gallbladder similar to  previous. Biliary stent appears to extend from the left lobe to the  duodenum.  3.  No hydronephrosis or bowel obstruction demonstrated.    KARIN GALLARDO MD       Discharge Medications   Current Discharge Medication List      START taking these medications    Details   norepinephrine (LEVOPHED) 16-0.9 MG/250ML-% SOLN Inject 2.178-29.04 mcg/min into the vein continuous  Qty:      Associated Diagnoses: Bacterial sepsis (H)      piperacillin-tazobactam (ZOSYN) 3-0.375 GM/50ML infusion Inject 50 mLs (3.375 g) into the vein every 6 hours  Qty:      Associated Diagnoses: Bacterial sepsis (H)         STOP taking these medications       calcium carbonate 600 mg-vitamin D 400 units (CALTRATE) 600-400 MG-UNIT per tablet Comments:   Reason for Stopping:         furosemide (LASIX) 20 MG tablet Comments:   Reason for Stopping:         lisinopril (ZESTRIL) 10 MG tablet Comments:   Reason for Stopping:         mesalamine (LIALDA) 1.2 g EC tablet Comments:   Reason for Stopping:         multivitamin (CENTRUM SILVER) tablet Comments:   Reason for Stopping:         nadolol (CORGARD) 20 MG tablet Comments:   Reason for Stopping:         potassium chloride ER (MICRO-K) 10 MEQ CR capsule Comments:   Reason for Stopping:         simvastatin (ZOCOR) 20 MG tablet Comments:   Reason for Stopping:         spironolactone (ALDACTONE) 50 MG tablet Comments:   Reason for Stopping:         ursodiol (ACTIGALL) 300 MG capsule Comments:   Reason for Stopping:              Allergies   Allergies   Allergen Reactions     Diagnostic X-Ray Materials Hives     PATIENT HAD HIVE REACTION AFTER ADMINISTERING CT CONTRAST DYE.       Contrast Dye

## 2021-05-01 NOTE — PROGRESS NOTES
Brief GI note:    Chart reviewed.     55-year-old female with history of ulcerative colitis, PSC status post biliary stent followed at [facility] complicated by cirrhosis and portal hypertension admitted with sepsis from SBP.  Clinically improved with IV antibiotics, directed by infectious disease.  Creatinine initially elevated, but now has normalized.  Bilirubin trending down (baseline elevated), though alk phos still elevated.    Nothing to add today.  We will see on Monday, but please call with any acute issues over the weekend.    Marcelina Vazquez MD MS  [facility]  Cell: [phone]  Office: [phone]

## 2021-05-01 NOTE — PROGRESS NOTES
[facility] [place]  Hospitalist Progress Note  Vern Leblanc MD 05/01/2021    Reason for Stay/active problem list      Septic shock    Streptococcus bacteremia    Spontaneous bacterial peritonitis    Chronic liver disease with ascites requiring periodic paracentesis    Acute kidney injury         Assessment and Plan:        Summary of Stay:     Patient is a 55-year-old lady with a history of primary sclerosing cholangitis.  She has a history of recurrent ascites and undergoes paracentesis on a regular basis.    She went get another paracentesis done  and was found to have a very low blood pressure in the 60s.  So she was sent to the [facility]  emergency room.  Patient also gives a history of low-grade fever at home.  But she denied any significant abdominal pain.     Her work-up in the emergency room showed significant leukocytosis of around 43,000, acute kidney injury, elevated LFTs.     She underwent paracentesis by IR in the emergency room.  The fluid analysis showed evidence of SBP based on cell counts.     Patient was started on IV Zosyn and she was admitted to Counts include 234 beds at the [facility] ICU       Patient progress during stay: Patient was admitted to intensive care unit and closely monitored with IV antibiotic and norepinephrine.  Blood culture obtained earlier grew Streptococcus species.  Hypotension persisted and patient required albumin and hydrocortisone as well.  Gastroenterology as well as intensive care medicine physician were consulted.        Problem List with Assessment and Plan      1. Septic shock likely due to spontaneous bacterial peritonitis and Streptococcus bacteremia versus hepatobiliary sepsis    Currently on Zosyn.  Afebrile.  WBC count is trending down at 27.8 down from 36.8     Blood culture from right arm April 29, 2021 grew Streptococcus species.  Ascites  fluid culture remained with no growth.  Urine culture negative.    Blood pressure is improving, vasopressin is off but patient requires Levophed  on and off.  At risk of deterioration, will continue Levophed as needed    Continue hydrocortisone 50 mg every 8 hours  Continue Zosyn,  procalcitonin significantly elevated 5.41 , infectious disease physician consulted and patient was seen by  .    Continue Zosyn, follow culture in progress      2. Acute kidney injury      Normal baseline kidney function.  Suspect this is due to septic shock.    Serum creatinine was 2.28 on admission, trending down to normal range of 0.89 today     Continue to monitor kidney function, intake and output monitoring and avoid nephrotoxic medications       3. Chronic liver disease due to primary sclerosing cholangitis with liver cirrhosis, recurrent ascites requiring paracentesis esophageal varices, biliary stent in place      LFTs are trending down except alk phos which is elevated at 472 up from 387 yesterday.    GI is following patient is to be transferred [facility] for further evaluation and treatment.  Patient has stent in place and will need replacement per GI.    4.  Hyponatremia: Serum sodium is normal today at 140     5.  Ascites: Status post paracentesis.  3.5 L fluid removed  in the emergency department on April 29, 2021.  Currently soft abdomen, respiratory status is stable    6.  Severe malnutrition in context of acute illness on chronic underlying liver disease: Patient was seen by dietitian and recommendation obtained including nutrition education, diet liberalization to 3 g of sodium.    Chronic medical problems:      Hypertension: Currently hypotensive.  Continue to hold lisinopril and nadolol    Hyperlipidemia: Hold statin due to liver failure          Plan for today:    Transfer to [facility] ICU team when bed is available.  Due to tenuous blood pressure and Septic Shock, patient is not stable for the floor at this time.    Addendum  ICU bed not available at [place].  Patient is tolerating off Levophed but still  "bedridden and did not ambulate yet.  Continue to hold Levophed, ambulate patient and monitor vital signs.  May possibly be downgraded to telemetry bed with possible transfer to medical telemetry tomorrow    LDA     Access : Peripheral    Jurado Catheter: not present        FEN :    Orders Placed This Encounter      Combination Diet 2 gm NA Diet      Advance Diet as Tolerated           Intake/Output Summary (Last 24 hours) at 4/30/2021 0950  Last data filed at 4/30/2021 0802  Gross per 24 hour   Intake 1364.89 ml   Output 300 ml   Net 1064.89 ml          DVT Prophylaxis:     Pneumatic Compression Devices    Code Status:      Full Code    Estimated discharge  disposition and plan:    Transfer to the [facility] ICU when bed is available.  Please review my discharge summary for details.      Interval History (Subjective):        Patient is seen and examined by me today and medical record reviewed.Overnight events noted and care discussed with nursing staff.  Patient is feeling better.  No shortness of breath, fever or chills.  No abdominal pain.  She required Levophed on and off last night.  Her blood pressure is borderline.          Physical Exam:        Last Vital Signs:  BP (!) 88/48   Pulse 57   Temp 98.2  F (36.8  C)   Resp 20   Ht 1.651 m (5' 5\")   Wt 72.5 kg (159 lb 13.3 oz)   SpO2 98%   BMI 26.60 kg/m      I/O last 3 completed shifts:  In: 3006.61 [P.O.:690; I.V.:2316.61]  Out: 2275 [Urine:2275]    Wt Readings from Last 5 Encounters:   04/30/21 72.5 kg (159 lb 13.3 oz)   02/22/21 72.6 kg (160 lb)   05/17/18 74.6 kg (164 lb 6.4 oz)        Constitutional: Awake, alert, cooperative, no apparent distress     Respiratory: Clear to auscultation bilaterally, no crackles or wheezing   Cardiovascular: Regular rate and rhythm, normal S1 and S2, and no murmur noted   Abdomen: Normal bowel sounds, soft, non-distended, non-tender   Skin: No new rashes, no cyanosis, dry to touch   Neuro: " Alert with  no new focal weakness   Extremities: No edema, normal range of motion   Other(s):        All other systems: Negative          Medications:        All current medications were reviewed with changes reflected in problem list.         Data:      All new lab and imaging data was reviewed.      Data reviewed today: I reviewed all new labs and imaging results over the last 24 hours. I personally reviewed no images or EKG's today      Recent Labs   Lab 05/01/21  0420 04/30/21  0455 04/29/21  0942   WBC 27.8* 36.8* 43.2*   HGB 9.3* 9.7* 9.7*   HCT 27.5* 28.0* 29.4*   MCV 90 90 90    434 427     Recent Labs   Lab 04/29/21  1106 04/29/21  1027 04/29/21  0942   CULT No growth  Cultured on the 1st day of incubation:  Gram positive cocci in pairs and chains  *  Critical Value/Significant Value, preliminary result only, called to and read back by  Sue Gutierrez RN on 4.30.2021 at 0942. KVO   Culture negative monitoring continues Cultured on the 1st day of incubation:  Gram positive cocci in pairs and chains  *  Critical Value/Significant Value, preliminary result only, called to and read back by  Shahbaz Hill RN @ 0410 4/30/21 TM.    (Note)  POSITIVE for STREPTOCOCCUS SPECIES OTHER THAN pneumococcus, anginosus  group, S. pyogenes and S. agalactiae. Performed using Phloronol  multiplex nucleic acid test. Final identification and antimicrobial  susceptibility testing will be verified by standard methods.    Specimen tested with Verigene multiplex, gram-positive blood culture  nucleic acid test for the following targets: Staph aureus, Staph  epidermidis, Staph lugdunensis, other Staph species, Enterococcus  faecalis, Enterococcus faecium, Streptococcus species, S. agalactiae,  S. anginosus grp., S. pneumoniae, S. pyogenes, Listeria sp., mecA  (methicillin resistance) and Fanny/B (vancomycin resistance).    Critical Value/Significant Value called to and read back by  Yousuf Traylor RN 4/30/21 @ 0649 TF        Recent Labs   Lab 05/01/21  0420 04/30/21  1630 04/30/21  0455 04/29/21  0942    136 129* 127*   POTASSIUM 3.8 3.8 4.7 5.3   CHLORIDE 115* 110* 102 98   CO2 18* 17* 17* 19*   ANIONGAP 7 9 10 10   * 161* 196* 71   BUN 44* 58* 73* 76*   CR 0.89 1.20* 1.69* 2.28*   GFRESTIMATED 72 50* 33* 23*   GFRESTBLACK 83 58* 38* 27*   MARIELENA 9.0 8.8 9.3 10.3*   PROTTOTAL 6.1*  --   --  7.0   ALBUMIN 1.5*  --   --  1.5*   BILITOTAL 2.1*  --  3.3* 3.2*   ALKPHOS 472*  --  387* 409*   *  --  198* 244*   *  --  128* 143*       Recent Labs   Lab 05/01/21  0815 05/01/21  0420 05/01/21  0028 04/30/21  1952 04/30/21  1630 04/30/21  1548 04/30/21  0455 04/30/21  0455 04/29/21  0942 04/29/21  0942   GLC  --  126*  --   --  161*  --   --  196*  --  71   * 128* 137* 135*  --  164*   < >  --    < >  --     < > = values in this interval not displayed.       Recent Labs   Lab 05/01/21  0420 04/29/21  0815   INR 1.60* 1.51*         No results for input(s): TROPONIN, TROPI, TROPR in the last 168 hours.    Invalid input(s): TROP, TROPONINIES    Recent Results (from the past 48 hour(s))   US Abdomen Limited    Narrative    US ABDOMEN LIMITED   4/29/2021 9:31 AM    HISTORY:  55-year-old woman with recurrent ascites, request made for  paracentesis.    TECHNIQUE: Patient was brought to the ultrasound department and  informed consent obtained. Prior to beginning procedure, blood  pressure was known to be low, initially  approximately 70/35. This  subsequently was 77/45, though final measurement was 60/20. Patient is  coherent, though lethargic. Given this finding, plan is to bring the  patient to the ER for evaluation.      Impression    IMPRESSION: Patient is hypotensive upon arrival. Lethargic. To be  taken to the ER for evaluation.     NICK WU MD   XR Chest Port 1 View    Narrative    CHEST ONE VIEW April 29, 2021 10:00 AM     HISTORY: Fever.    COMPARISON: None.      Impression    IMPRESSION: No acute  disease.    KARIN GALLARDO MD   US Paracentesis    Narrative    US PARACENTESIS  4/29/2021 10:51 AM       HISTORY: High volume paracentesis with or without diagnostic fluid  analysis with labs to be drawn if ordered. Total paracentesis volume  as much as possible.    PROCEDURE:  Written informed consent was obtained from the patient  prior to the procedure. The risks and benefits including bleeding,  infection and abdominal organ injury were discussed and the patient  wished to continue. Initial ultrasound images demonstrated a large  right lower quadrant fluid collection. A permanent image was saved.  The skin overlying this collection was marked, prepped, draped and  anesthetized in usual sterile fashion utilizing 10 mL of lidocaine 1%.  The paracentesis catheter was then placed into the peritoneal fluid  collection with return of 3500 mL of yellow fluid. Estimated blood  loss during the procedure was less than 5 mL. No specimens collected.  Patient tolerated the procedure well. The patient will receive  intravenous albumin on the usual sliding scale as needed per protocol.       With continuous ultrasound guidance, 8 French needle and catheter  advanced into the ascites and ultrasound image was stored for  documentation.      Impression    IMPRESSION:  Ultrasound-guided paracentesis.  3.5 L removed. Sample  sent to the lab for evaluation.      NICK WU MD   CT Abdomen Pelvis w/o Contrast    Narrative    CT ABDOMEN PELVIS WITHOUT CONTRAST 4/29/2021 3:29 PM    CLINICAL HISTORY: Hypotension, septic shock.   TECHNIQUE: CT scan of the abdomen and pelvis was performed without IV  contrast. Multiplanar reformats were obtained. Dose reduction  techniques were used.  CONTRAST: None.    COMPARISON: MRI from February 4, 2021    FINDINGS:   LOWER CHEST: Minimal bibasilar atelectasis vs. less likely infiltrate  right worse than left. Trace pleural fluid.    HEPATOBILIARY: Cirrhotic-appearing liver morphology.  Distended  gallbladder similar to previous. No calcified gallstones. Biliary  stent noted.    PANCREAS: Grossly unremarkable.    SPLEEN: Splenomegaly at 14.5 cm. No focal lesions.    ADRENAL GLANDS: Somewhat thickened bilaterally. No discrete nodules.    KIDNEYS/BLADDER: No significant mass, stones, or hydronephrosis.    BOWEL: No obstruction or inflammatory change.    LYMPH NODES: No gross lymphadenopathy in the absence of contrast.    VASCULATURE: No abdominal aortic aneurysm.    PELVIC ORGANS: No pelvic masses.    OTHER: Small to moderate amount of ascites. No free air.    MUSCULOSKELETAL: No suspicious bony lesions.      Impression    IMPRESSION:   1.  Small to moderate amount of ascites.  2.  Cirrhotic liver morphology and distended gallbladder similar to  previous. Biliary stent appears to extend from the left lobe to the  duodenum.  3.  No hydronephrosis or bowel obstruction demonstrated.    KARIN GALLARDO MD       COVID Status:  COVID-19 PCR Results    COVID-19 PCR Results 11/3/20 11/29/20 12/2/20 12/21/20 1/2/21 1/6/21 4/8/21 4/8/21 4/27/21 4/27/21 4/29/21          1500 1500 1332 1332    COVID-19 Virus PCR to [facility] - Result       Test received-See reflex to IDDL test SARS CoV2 (COVID-19) Virus RT-PCR  Test received-See reflex to IDDL test SARS CoV2 (COVID-19) Virus RT-PCR     COVID-19 Virus PCR to [facility] - Source       Nasopharyngeal  Nasopharyngeal     COVID-19 Virus by PCR (External Result) Not Detected Not Detected Not Detected Not Detected Not Detected Not Detected        SARS-CoV-2 Virus Specimen Source        Nasopharyngeal  Nasopharyngeal    Flu A/B & SARS-COV-2 PCR Source           Nasopharyngeal   SARS-CoV-2 PCR Result        NEGATIVE  NEGATIVE NEGATIVE      Comments are available for some flowsheets but are not being displayed.         COVID-19 Antibody Results, Testing for Immunity    COVID-19 Antibody Results, Testing for Immunity   No data to display.              Disclaimer: This note  consists of symbols derived from keyboarding, dictation and/or voice recognition software. As a result, there may be errors in the script that have gone undetected. Please consider this when interpreting information found in this chart.

## 2021-05-01 NOTE — CONSULTS
"CLINICAL NUTRITION SERVICES  -  ASSESSMENT NOTE      MALNUTRITION:  % Weight Loss: Unable to determine, masked by ascites and fluid shifts overall, do suspect true wt loss based on report from patient and NFPE  % Intake:  <75% for > 7 days (non-severe malnutrition)  Subcutaneous Fat Loss:  Orbital region mild depletion and Upper arm region moderate depletion   Muscle Loss:  Temporal region moderate depletion, Clavicle bone region moderate to severe depletion, Acromion bone region moderate to severe depletion, Patellar region moderate to severe depletion, Anterior thigh region moderate depletion and Posterior calf region moderate depletion  Fluid Retention: Ascites present    Malnutrition Diagnosis: Severe malnutrition  In Context of:  Acute illness or injury with underlying chronic illness or disease        REASON FOR ASSESSMENT  Berta Nava is a 55 year old female seen by Registered Dietitian for RN Consult - \"liver failure - increased protein needs\".     PMH of: Ulcerative colitis, PSC, cirrhosis, esophageal varices, ascites, biliary stent, on liver tx list, HTN.    Admit 2/2: Septic shock with SBP.    NUTRITION HISTORY  - Information obtained from patient and chart.  Of note, planning to transfer to [facility] when bed available.    - Pre-transplant RD evaluation 3/23/2019.  No RD contact since this time.  Able to verbalize recommendations for high protein and low sodium.  Notes she was asked about muscle loss at this time and wasn't sure what this meant.  Over the past especially few months she has noticed loss of muscle.  - Diet at home: Low sodium verbalized.  Describes she has self-diagnosed with lactose intolerance but still does cheese, yogurt, and sometimes ice cream.  Avoids cows milk and opts for lactose free dairy products whenever available.   - Usual intakes: Small/frequent meals as able.  Notes she is \"never\" hungry.  Tries to eat small amounts of cereal, yogurt, applesauce, fruit in general, beans, " "eggs.  Notes she does not like meats.    - Barriers to PO intakes: Overall decreased appetite, early satiety.  No nausea.  She reports that over the past week she has been eating less than usual d/t having no appetite and feeling full.  Only able to take small amounts of fruit, jello, and a protein drink.    - Use of oral supplements: Drinks Premiere protein daily.  Aims for at least 1 and has to split throughout the day.    - Chewing/swallowing issues: Denied.  - Meal preparation/grocery shopping: Patient or .  Describes she has support of  at home.  She also has a dog at home she cares about.   - Allergies: NKFA.  Self-selects based on lactose/dairy products as above.      CURRENT NUTRITION ORDERS  Diet Order:     2 gram Na  Boost shake between meals BID    Current Intake/Tolerance:  Only flowsheet recording since admit is 50% of 1 meal.  Limited timeframe.  Patient herself reports having most of 2 meals since admission.  She has been saving shelf stable food items from trays to eat throughout the day in order to mimic small/frequent meals.  She was \"surprised\" by the Ensure shake she received this AM, but liked the flavor and drank the entire drink.  We discussed high calorie/high protein, ability to liberalize diet to 3 gram Na.  See below.        NUTRITION FOCUSED PHYSICAL ASSESSMENT FOR DIAGNOSING MALNUTRITION)  Yes     Obtained from Chart/Interdisciplinary Team:  - Ascites  - Sclera jaundiced  - No documentation of PI  - Stooling patterns reviewed    ANTHROPOMETRICS  Height: 5' 5\"  Weight: 159 lbs 13.34 oz  Body mass index is 26.6 kg/m .  Weight Status:  Overweight BMI 25-29.9  Weight History:  Wt Readings from Last 10 Encounters:   04/30/21 72.5 kg (159 lb 13.3 oz)   02/22/21 72.6 kg (160 lb)   05/17/18 74.6 kg (164 lb 6.4 oz)     - Receives regularly paracenteses and likely fluid impacting wt trending, though no recent and consistent trending available.  Currently has ascites to a degree.    - " Patient herself tracks wt (along with urine and BMs) in her phone.  Reports the following:  - Wt closer to 176# in 10/2020.  - 164# in 11/2020.  - 184# in 1/2021.  - 163# in 2/2021.  - 162# in 4/2021 --> began receiving leann.  - Lowest wt of 151-153# in mid 4/2021.  Likely a reflection of dry/true wt.    LABS  Labs reviewed:  Electrolytes  Potassium (mmol/L)   Date Value   05/01/2021 3.8   04/30/2021 3.8   04/30/2021 4.7     Phosphorus (mg/dL)   Date Value   05/16/2018 2.6    Blood Glucose  Glucose (mg/dL)   Date Value   05/01/2021 126 (H)   04/30/2021 161 (H)   04/30/2021 196 (H)   04/29/2021 71   03/23/2021 90     Hemoglobin A1C (%)   Date Value   04/29/2021 4.3    Inflammatory Markers  WBC (10e9/L)   Date Value   05/01/2021 27.8 (H)   04/30/2021 36.8 (H)   04/29/2021 43.2 (H)     Albumin (g/dL)   Date Value   05/01/2021 1.5 (L)   04/29/2021 1.5 (L)   03/23/2021 2.0 (L)      Magnesium (mg/dL)   Date Value   05/14/2018 2.7 (H)   05/13/2018 1.9     Sodium (mmol/L)   Date Value   05/01/2021 140   04/30/2021 136   04/30/2021 129 (L)    Renal  Urea Nitrogen (mg/dL)   Date Value   05/01/2021 44 (H)   04/30/2021 58 (H)   04/30/2021 73 (H)     Creatinine (mg/dL)   Date Value   05/01/2021 0.89   04/30/2021 1.20 (H)   04/30/2021 1.69 (H)     Additional  Ketones Urine (mg/dL)   Date Value   04/29/2021 Negative        B/P: 88/48, T: 96.7, P: 54, R: 16      MEDICATIONS  Medications reviewed:    hydrocortisone sodium succinate PF  50 mg Intravenous Q8H     piperacillin-tazobactam  3.375 g Intravenous Q6H        norepinephrine Stopped (05/01/21 0630)     sodium chloride 100 mL/hr at 05/01/21 0747          ASSESSED NUTRITION NEEDS PER APPROVED PRACTICE GUIDELINES:    Dosing Weight 70 kg - suspected dry wt  Estimated Energy Needs: 25-30 Kcal/Kg  Justification: maintenance  Estimated Protein Needs: >/=1.2 g pro/Kg  Justification: Repletion and preservation of lean body mass  Estimated Fluid Needs: per MD      NUTRITION  DIAGNOSIS:  Malnutrition related to acute on chronic decreased appetite, early satiety, loss of LBM in the setting of cirrhosis as evidenced by fat/muscle loss, meeting <75% needs or less over the past week PTA (likely longer), wt changes masked by ascites/fluid shifts.    NUTRITION INTERVENTIONS  Recommendations / Nutrition Prescription  Offered diet liberalization to 3 gram Na.  Patient declined as she has been tracking Na intake and does not feel restricted at this time.  Self-selection of protein focus.  Small/frequent meals as needed.    Continue supplement as ordered.  Offered to change to half portions or regular Ensure, though she likes the shakes and wants to continue as ordered.       Implementation  Nutrition education: Provided education on high calorie/high protein.  Encouraged protein sources with each meal/snack and 2 Ensure shakes daily.  Patient able to verbalize.  Discussed option to liberalize diet slightly if she is feeling restricted.    Medical Food Supplement: As above.     Collaboration and Referral of Nutrition care: Discussed POC with team during rounds.     Nutrition Goals  Patient to consume at least 75% of meals TID + 2 supplements daily.       MONITORING AND EVALUATION:  Progress towards goals will be monitored and evaluated per protocol and Practice Guidelines          Rocio Love RDN, LD  Clinical Dietitian  3rd floor/ICU: [phone]  All other floors: [phone]  Weekend/holiday: [phone]

## 2021-05-02 LAB
ERYTHROCYTE [DISTWIDTH] IN BLOOD BY AUTOMATED COUNT: 16 % (ref 10–15)
GLUCOSE BLDC GLUCOMTR-MCNC: 112 MG/DL (ref 70–99)
HCT VFR BLD AUTO: 35.8 % (ref 35–47)
HGB BLD-MCNC: 11.5 G/DL (ref 11.7–15.7)
LACTATE BLD-SCNC: 2.6 MMOL/L (ref 0.7–2)
MCH RBC QN AUTO: 30.6 PG (ref 26.5–33)
MCHC RBC AUTO-ENTMCNC: 32.1 G/DL (ref 31.5–36.5)
MCV RBC AUTO: 95 FL (ref 78–100)
PLATELET # BLD AUTO: 320 10E9/L (ref 150–450)
RBC # BLD AUTO: 3.76 10E12/L (ref 3.8–5.2)
WBC # BLD AUTO: 25 10E9/L (ref 4–11)

## 2021-05-02 PROCEDURE — 85027 COMPLETE CBC AUTOMATED: CPT | Performed by: INTERNAL MEDICINE

## 2021-05-02 PROCEDURE — 83605 ASSAY OF LACTIC ACID: CPT | Performed by: INTERNAL MEDICINE

## 2021-05-02 PROCEDURE — 250N000013 HC RX MED GY IP 250 OP 250 PS 637: Performed by: INTERNAL MEDICINE

## 2021-05-02 PROCEDURE — 99233 SBSQ HOSP IP/OBS HIGH 50: CPT | Performed by: INTERNAL MEDICINE

## 2021-05-02 PROCEDURE — 120N000001 HC R&B MED SURG/OB

## 2021-05-02 PROCEDURE — 258N000003 HC RX IP 258 OP 636: Performed by: INTERNAL MEDICINE

## 2021-05-02 PROCEDURE — 999N001017 HC STATISTIC GLUCOSE BY METER IP

## 2021-05-02 PROCEDURE — 250N000011 HC RX IP 250 OP 636: Performed by: INTERNAL MEDICINE

## 2021-05-02 PROCEDURE — 36415 COLL VENOUS BLD VENIPUNCTURE: CPT | Performed by: INTERNAL MEDICINE

## 2021-05-02 RX ORDER — LANOLIN ALCOHOL/MO/W.PET/CERES
3 CREAM (GRAM) TOPICAL
Status: DISCONTINUED | OUTPATIENT
Start: 2021-05-02 | End: 2021-05-03 | Stop reason: HOSPADM

## 2021-05-02 RX ADMIN — TAZOBACTAM SODIUM AND PIPERACILLIN SODIUM 3.38 G: 375; 3 INJECTION, SOLUTION INTRAVENOUS at 20:14

## 2021-05-02 RX ADMIN — TAZOBACTAM SODIUM AND PIPERACILLIN SODIUM 3.38 G: 375; 3 INJECTION, SOLUTION INTRAVENOUS at 07:41

## 2021-05-02 RX ADMIN — MELATONIN TAB 3 MG 3 MG: 3 TAB at 23:22

## 2021-05-02 RX ADMIN — HYDROCORTISONE SODIUM SUCCINATE 50 MG: 100 INJECTION, POWDER, FOR SOLUTION INTRAMUSCULAR; INTRAVENOUS at 04:47

## 2021-05-02 RX ADMIN — TAZOBACTAM SODIUM AND PIPERACILLIN SODIUM 3.38 G: 375; 3 INJECTION, SOLUTION INTRAVENOUS at 02:28

## 2021-05-02 RX ADMIN — SODIUM CHLORIDE: 9 INJECTION, SOLUTION INTRAVENOUS at 04:47

## 2021-05-02 RX ADMIN — TAZOBACTAM SODIUM AND PIPERACILLIN SODIUM 3.38 G: 375; 3 INJECTION, SOLUTION INTRAVENOUS at 14:21

## 2021-05-02 ASSESSMENT — ACTIVITIES OF DAILY LIVING (ADL)
ADLS_ACUITY_SCORE: 13

## 2021-05-02 ASSESSMENT — MIFFLIN-ST. JEOR: SCORE: 1354.73

## 2021-05-02 NOTE — PROGRESS NOTES
[facility]  Hospitalist Progress Note  Vern Leblanc MD 05/02/2021    Reason for Stay/active problem list      Septic shock-resolved    Streptococcus bacteremia    Spontaneous bacterial peritonitis    Chronic liver disease with ascites requiring periodic paracentesis    Acute kidney injury         Assessment and Plan:        Summary of Stay:     Patient is a 55-year-old lady with a history of primary sclerosing cholangitis.  She has a history of recurrent ascites and undergoes paracentesis on a regular basis.    She went get another paracentesis done  and was found to have a very low blood pressure in the 60s.  So she was sent to the [facility]  emergency room.  Patient also gives a history of low-grade fever at home.  But she denied any significant abdominal pain.     Her work-up in the emergency room showed significant leukocytosis of around 43,000, acute kidney injury, elevated LFTs.     She underwent paracentesis by IR in the emergency room.  The fluid analysis showed evidence of SBP based on cell counts.     Patient was started on IV Zosyn and she was admitted to [facility] ICU       Patient progress during stay: Patient was admitted to intensive care unit and closely monitored with IV antibiotic and norepinephrine.  Blood culture obtained earlier grew Streptococcus species.  Hypotension persisted and patient required albumin and hydrocortisone as well.  Gastroenterology as well as intensive care medicine physician were consulted.    Patient remained stable and Levophed was weaned off.  [facility] hospitalist service consulted for possible transfer to medical telemetry.  Dr Robles accepted the patient when bed is available.  Discharge summary and discharge instructions already completed.      Problem List with Assessment and Plan      1. Septic shock likely due to spontaneous bacterial peritonitis and Streptococcus bacteremia versus hepatobiliary sepsis    Resolved.   Patient is off pressor.  Will discontinue steroid.  Continue to monitor vitals and cultures    Patient is currently on Zosyn.  Infectious disease physician consulted and has been following.    Procalcitonin is elevated 5.41 on April 30    Patient is afebrile, WBC count trending down.  Hypotension resolved.    Continue Zosyn, follow culture and sensitivity result      2. Acute kidney injury      Normal baseline kidney function.  Suspect this is due to septic shock.    Serum creatinine was 2.28 on admission, trending down to normal range of 0.89     Continue to monitor kidney function, intake and output monitoring and avoid nephrotoxic medications       3. Chronic liver disease due to primary sclerosing cholangitis with liver cirrhosis, recurrent ascites requiring paracentesis esophageal varices, biliary stent in place      LFTs are trending down except alk phos    We will check LFTs today     which is elevated at 472 up from 387 yesterday.    GI is following, patient is to be transferred [facility] for further evaluation and treatment.  Patient has stent in place and will need replacement per GI.    4.  Hyponatremia: Serum sodium is normal      5.  Ascites: Status post paracentesis.  3.5 L fluid removed  in the emergency department on April 29, 2021.  Currently soft abdomen, respiratory status is stable    6.  Severe malnutrition in context of acute illness on chronic underlying liver disease: Patient was seen by dietitian and recommendation obtained including nutrition education, diet liberalization to 3 g of sodium.      Chronic medical problems:      Hypertension: Currently hypotensive.  Continue to hold lisinopril and nadolol for now    Hyperlipidemia: Hold statin due to liver failure          Plan for today:  Transfer to [facility] medical telemetry unit.  Accepting physician is Dr. Guerrero there is available    LDA     Access : Peripheral    Jurado Catheter: not  "present        FEN :    Orders Placed This Encounter      Combination Diet 2 gm NA Diet      Advance Diet as Tolerated           Intake/Output Summary (Last 24 hours) at 4/30/2021 0950  Last data filed at 4/30/2021 0802  Gross per 24 hour   Intake 1364.89 ml   Output 300 ml   Net 1064.89 ml          DVT Prophylaxis:     Pneumatic Compression Devices    Code Status:      Full Code    Estimated discharge  disposition and plan:    Transfer to the [facility] medical telemetry when bed is available.  Please review my discharge summary for details.      Interval History (Subjective):        Patient is seen and examined by me today and medical record reviewed.Overnight events noted and care discussed with nursing staff.  Patient is doing well without any complaints.  No fever chills.  No abdominal pain.  No nausea or vomiting.    Last Vital Signs:  /84   Pulse 56   Temp 97  F (36.1  C) (Axillary)   Resp 24   Ht 1.651 m (5' 5\")   Wt 72.5 kg (159 lb 13.3 oz)   SpO2 97%   BMI 26.60 kg/m      I/O last 3 completed shifts:  In: 2702.53 [I.V.:2702.53]  Out: 1500 [Urine:1500]    Wt Readings from Last 5 Encounters:   04/30/21 72.5 kg (159 lb 13.3 oz)   02/22/21 72.6 kg (160 lb)   05/17/18 74.6 kg (164 lb 6.4 oz)        Constitutional: Awake, alert, cooperative, no apparent distress     Respiratory: Clear to auscultation bilaterally, no crackles or wheezing   Cardiovascular: Regular rate and rhythm, normal S1 and S2, and no murmur noted   Abdomen: Normal bowel sounds, soft, non-distended, non-tender   Skin: No new rashes, no cyanosis, dry to touch   Neuro: Alert with  no new focal weakness   Extremities: No edema, normal range of motion   Other(s):        All other systems: Negative          Medications:        All current medications were reviewed with changes reflected in problem list.         Data:      All new lab and imaging data was reviewed.      Data reviewed today: I reviewed all new " labs and imaging results over the last 24 hours. I personally reviewed no images or EKG's today      Recent Labs   Lab 05/02/21  1241 05/01/21  0420 04/30/21  0455   WBC 25.0* 27.8* 36.8*   HGB 11.5* 9.3* 9.7*   HCT 35.8 27.5* 28.0*   MCV 95 90 90    328 434     Recent Labs   Lab 05/01/21  0855 04/29/21  1106 04/29/21  1027 04/29/21  0942   CULT No growth after 20 hours Cultured on the 1st day of incubation:  Streptococcus mitis group  Speciation in progress  *  Critical Value/Significant Value, preliminary result only, called to and read back by  Sue Gutierrez RN on 4.30.2021 at 0942. KVO    Susceptibility testing done on previous specimen  No growth Culture negative monitoring continues Cultured on the 1st day of incubation:  Streptococcus mitis group  Speciation in progress  *  Critical Value/Significant Value, preliminary result only, called to and read back by  Shahbaz Hill RN @ 0410 4/30/21 TM.    (Note)  POSITIVE for STREPTOCOCCUS SPECIES OTHER THAN pneumococcus, anginosus  group, S. pyogenes and S. agalactiae. Performed using RetiDiag  multiplex nucleic acid test. Final identification and antimicrobial  susceptibility testing will be verified by standard methods.    Specimen tested with Verigene multiplex, gram-positive blood culture  nucleic acid test for the following targets: Staph aureus, Staph  epidermidis, Staph lugdunensis, other Staph species, Enterococcus  faecalis, Enterococcus faecium, Streptococcus species, S. agalactiae,  S. anginosus grp., S. pneumoniae, S. pyogenes, Listeria sp., mecA  (methicillin resistance) and Fanny/B (vancomycin resistance).    Critical Value/Significant Value called to and read back by  Yousuf Traylor RN 4/30/21 @ 0649 TF       Recent Labs   Lab 05/01/21  0420 04/30/21  1630 04/30/21  0455 04/29/21  0942    136 129* 127*   POTASSIUM 3.8 3.8 4.7 5.3   CHLORIDE 115* 110* 102 98   CO2 18* 17* 17* 19*   ANIONGAP 7 9 10 10   * 161* 196* 71   BUN 44*  58* 73* 76*   CR 0.89 1.20* 1.69* 2.28*   GFRESTIMATED 72 50* 33* 23*   GFRESTBLACK 83 58* 38* 27*   MARIELENA 9.0 8.8 9.3 10.3*   PROTTOTAL 6.1*  --   --  7.0   ALBUMIN 1.5*  --   --  1.5*   BILITOTAL 2.1*  --  3.3* 3.2*   ALKPHOS 472*  --  387* 409*   *  --  198* 244*   *  --  128* 143*       Recent Labs   Lab 05/02/21  0017 05/01/21  1934 05/01/21  1558 05/01/21  1241 05/01/21  0815 05/01/21  0420 04/30/21  1630 04/30/21  1630 04/30/21  0455 04/30/21  0455 04/29/21  0942 04/29/21  0942   GLC  --   --   --   --   --  126*  --  161*  --  196*  --  71   * 145* 133* 160* 120* 128*   < >  --    < >  --    < >  --     < > = values in this interval not displayed.       Recent Labs   Lab 05/01/21  0420 04/29/21  0815   INR 1.60* 1.51*         No results for input(s): TROPONIN, TROPI, TROPR in the last 168 hours.    Invalid input(s): TROP, TROPONINIES    Recent Results (from the past 48 hour(s))   US Abdomen Limited    Narrative    US ABDOMEN LIMITED   4/29/2021 9:31 AM    HISTORY:  55-year-old woman with recurrent ascites, request made for  paracentesis.    TECHNIQUE: Patient was brought to the ultrasound department and  informed consent obtained. Prior to beginning procedure, blood  pressure was known to be low, initially  approximately 70/35. This  subsequently was 77/45, though final measurement was 60/20. Patient is  coherent, though lethargic. Given this finding, plan is to bring the  patient to the ER for evaluation.      Impression    IMPRESSION: Patient is hypotensive upon arrival. Lethargic. To be  taken to the ER for evaluation.     NICK WU MD   XR Chest Port 1 View    Narrative    CHEST ONE VIEW April 29, 2021 10:00 AM     HISTORY: Fever.    COMPARISON: None.      Impression    IMPRESSION: No acute disease.    KARIN GALLARDO MD   US Paracentesis    Narrative    US PARACENTESIS  4/29/2021 10:51 AM       HISTORY: High volume paracentesis with or without diagnostic fluid  analysis with labs  to be drawn if ordered. Total paracentesis volume  as much as possible.    PROCEDURE:  Written informed consent was obtained from the patient  prior to the procedure. The risks and benefits including bleeding,  infection and abdominal organ injury were discussed and the patient  wished to continue. Initial ultrasound images demonstrated a large  right lower quadrant fluid collection. A permanent image was saved.  The skin overlying this collection was marked, prepped, draped and  anesthetized in usual sterile fashion utilizing 10 mL of lidocaine 1%.  The paracentesis catheter was then placed into the peritoneal fluid  collection with return of 3500 mL of yellow fluid. Estimated blood  loss during the procedure was less than 5 mL. No specimens collected.  Patient tolerated the procedure well. The patient will receive  intravenous albumin on the usual sliding scale as needed per protocol.       With continuous ultrasound guidance, 8 French needle and catheter  advanced into the ascites and ultrasound image was stored for  documentation.      Impression    IMPRESSION:  Ultrasound-guided paracentesis.  3.5 L removed. Sample  sent to the lab for evaluation.      NICK WU MD   CT Abdomen Pelvis w/o Contrast    Narrative    CT ABDOMEN PELVIS WITHOUT CONTRAST 4/29/2021 3:29 PM    CLINICAL HISTORY: Hypotension, septic shock.   TECHNIQUE: CT scan of the abdomen and pelvis was performed without IV  contrast. Multiplanar reformats were obtained. Dose reduction  techniques were used.  CONTRAST: None.    COMPARISON: MRI from February 4, 2021    FINDINGS:   LOWER CHEST: Minimal bibasilar atelectasis vs. less likely infiltrate  right worse than left. Trace pleural fluid.    HEPATOBILIARY: Cirrhotic-appearing liver morphology. Distended  gallbladder similar to previous. No calcified gallstones. Biliary  stent noted.    PANCREAS: Grossly unremarkable.    SPLEEN: Splenomegaly at 14.5 cm. No focal lesions.    ADRENAL GLANDS:  Somewhat thickened bilaterally. No discrete nodules.    KIDNEYS/BLADDER: No significant mass, stones, or hydronephrosis.    BOWEL: No obstruction or inflammatory change.    LYMPH NODES: No gross lymphadenopathy in the absence of contrast.    VASCULATURE: No abdominal aortic aneurysm.    PELVIC ORGANS: No pelvic masses.    OTHER: Small to moderate amount of ascites. No free air.    MUSCULOSKELETAL: No suspicious bony lesions.      Impression    IMPRESSION:   1.  Small to moderate amount of ascites.  2.  Cirrhotic liver morphology and distended gallbladder similar to  previous. Biliary stent appears to extend from the left lobe to the  duodenum.  3.  No hydronephrosis or bowel obstruction demonstrated.    KARIN GALLARDO MD       COVID Status:  COVID-19 PCR Results    COVID-19 PCR Results 11/3/20 11/29/20 12/2/20 12/21/20 1/2/21 1/6/21 4/8/21 4/8/21 4/27/21 4/27/21 4/29/21          1500 1500 1332 1332    COVID-19 Virus PCR to [facility] - Result       Test received-See reflex to IDDL test SARS CoV2 (COVID-19) Virus RT-PCR  Test received-See reflex to IDDL test SARS CoV2 (COVID-19) Virus RT-PCR     COVID-19 Virus PCR to [facility] - Source       Nasopharyngeal  Nasopharyngeal     COVID-19 Virus by PCR (External Result) Not Detected Not Detected Not Detected Not Detected Not Detected Not Detected        SARS-CoV-2 Virus Specimen Source        Nasopharyngeal  Nasopharyngeal    Flu A/B & SARS-COV-2 PCR Source           Nasopharyngeal   SARS-CoV-2 PCR Result        NEGATIVE  NEGATIVE NEGATIVE      Comments are available for some flowsheets but are not being displayed.         COVID-19 Antibody Results, Testing for Immunity    COVID-19 Antibody Results, Testing for Immunity   No data to display.              Disclaimer: This note consists of symbols derived from keyboarding, dictation and/or voice recognition software. As a result, there may be errors in the script that have gone undetected. Please consider this when interpreting  information found in this chart.

## 2021-05-02 NOTE — DISCHARGE SUMMARY
[facility]  Hospitalist Discharge Summary      Date of Admission:  4/29/2021  Date of Discharge:  4/30/2021  Discharging Provider: Vern Leblanc MD      Discharge Diagnoses       Septic shock    Streptococcus bacteremia    Spontaneous bacterial peritonitis     Suspected hepatobiliary sepsis    Chronic liver disease with ascites requiring periodic paracentesis    Acute kidney injury    Follow-ups Needed After Discharge   Follow-up Appointments     Follow Up and recommended labs and tests      Transfer to [facility] ICU team, consultation   with hepatologist Dr. Leventhal             Unresulted Labs Ordered in the Past 30 Days of this Admission     Date and Time Order Name Status Description    5/1/2021 0200 Blood culture Preliminary     4/29/2021 0951 Fluid Culture Aerobic Bacterial Preliminary     4/29/2021 0949 Blood culture Preliminary     4/29/2021 0949 Blood culture Preliminary       These results will be followed up by [facility] ICU team    Discharge Disposition   Transferred to [facility] ICU  Condition at discharge: Stable      Hospital Course      Patient is a 55-year-old lady with a history of primary sclerosing cholangitis.  She has a history of recurrent ascites and undergoes paracentesis on a regular basis.    She went get another paracentesis done  and was found to have a very low blood pressure in the 60s.  So she was sent to the [facility]  emergency room.  Patient also gives a history of low-grade fever at home.  But she denied any significant abdominal pain.     Her work-up in the emergency room showed significant leukocytosis of around 43,000, acute kidney injury, elevated LFTs.     She underwent paracentesis by IR in the emergency room.  The fluid analysis showed evidence of SBP based on cell counts.     Patient was started on IV Zosyn and she was admitted to [facility] ICU         Patient was  admitted to intensive care unit and closely monitored with IV antibiotic and norepinephrine support.  Blood culture obtained earlier grew Streptococcus species and culture and sensitivities pending.    Hypotension persisted and patient required albumin and hydrocortisone as well. Gastroenterology as well as intensive care medicine physician were consulted.    Patient was seen by gastroenterology team and I discussed with Dr. Shafer who recommended to transfer the patient to [facility] given patient's complex care requirement including  possible hepatobiliary intervention and evaluation for liver transplant.    Dr. Leventhal from [facility] hepatology unit was also contacted and he agreed with  transferring this patient to the [facility] ICU team for further care.  Dr. Leventhal will be consulting after admission to [facility].  Dr Perlman of ICU team at [facility] accepted patient to his care when bed is available.    On the day of transfer, patient was alert and oriented x3.  Patient has history of blood pressure 90s on a low-dose norepinephrine drip.     Patient remained at [facility] ICU due to lack of beds at the [facility].  She remained in the ICU on Levophed for hypotension.    She can be transferred to [facility] ICU for close monitoring and treatment as planned yesterday.  Patient is not stable for medical floor due to tenuous blood pressure and septic shock and risk for deterioration.    Addendum     Patient was seen and examined by me today. She is off Levophed since yesterday. She remained stable. She is afebrile without any significant symptoms. Patient can be downgraded to medical telemetry and can transfer to [facility] medical telemetry unit and instead  of intensive care unit. To that effect, I spoke with Dr. Guerrero at the [facility] who graciously accepted  the care of this patient to his care and consult GI/hepatology team for further recommendations. Patient can be transferred to [facility] medical telemetry unit when bed is available.    Consultations This Hospital Stay   GASTROENTEROLOGY IP CONSULT  NEPHROLOGY IP CONSULT  ADVANCE DIRECTIVE IP CONSULT  INFECTIOUS DISEASES IP CONSULT  NUTRITION SERVICES ADULT IP CONSULT    Code Status   Full Code    Time Spent on this Encounter   I, Vern Leblanc MD, personally saw the patient today and spent greater than 30 minutes discharging this patient.       Vern Leblanc MD  [facility] ICU  [address] [facility] [address]  Phone: [phone]  Fax: [phone]  ______________________________________________________________________    Physical Exam   Vital Signs: Temp: 97.3  F (36.3  C) Temp src: Axillary BP: (!) 149/88 Pulse: 66   Resp: 20 SpO2: 97 % O2 Device: None (Room air)    Weight: 159 lbs 13.34 oz         Primary Care Physician   [facility]    Discharge Orders      Reason for your hospital stay    Severe sepsis with septic shock     Intake and output    Every shift     Activity - Up ad indy     Follow Up and recommended labs and tests    Transfer to [facility] ICU team, consultation with hepatologist Dr. Leventhal     Full Code     Advance Diet as Tolerated    Follow this diet upon discharge: Advance to a regular diet as tolerated       Significant Results and Procedures   Most Recent 3 CBC's:  Recent Labs   Lab Test 05/02/21  1241 05/01/21  0420 04/30/21  0455   WBC 25.0* 27.8* 36.8*   HGB 11.5* 9.3* 9.7*   MCV 95 90 90    328 434     Most Recent 3 BMP's:  Recent Labs   Lab Test 05/01/21  0420 04/30/21  1630 04/30/21  0455    136 129*   POTASSIUM 3.8 3.8 4.7   CHLORIDE 115* 110* 102   CO2 18* 17* 17*   BUN 44* 58* 73*   CR 0.89 1.20* 1.69*   ANIONGAP 7 9 10   MARIELENA 9.0 8.8 9.3   * 161* 196*      Most Recent 2 LFT's:  Recent Labs   Lab Test 05/01/21  0420 04/30/21  0455   * 198*   * 128*   ALKPHOS 472* 387*   BILITOTAL 2.1* 3.3*     Most Recent 3 INR's:  Recent Labs   Lab Test 05/01/21  0420 04/29/21  0815 03/23/21  0744   INR 1.60* 1.51* 1.39*     Most Recent 3 Troponin's:No lab results found.  Most Recent 3 BNP's:No lab results found.  Most Recent D-dimer:No lab results found.  Most Recent 6 Bacteria Isolates From Any Culture (See EPIC Reports for Culture Details):  Recent Labs   Lab Test 05/01/21  0855 04/29/21  1106 04/29/21  1027 04/29/21  0942 05/13/18  1145 05/13/18  1143   CULT No growth after 1 day Cultured on the 1st day of incubation:  Streptococcus mitis group  Speciation in progress  *  Critical Value/Significant Value, preliminary result only, called to and read back by  Sue Gutierrez RN on 4.30.2021 at 0942. KVO    Susceptibility testing done on previous specimen  No growth Culture negative monitoring continues Cultured on the 1st day of incubation:  Streptococcus mitis group  Speciation in progress  *  Critical Value/Significant Value, preliminary result only, called to and read back by  Shahbaz Hill RN @ 0410 4/30/21 TM.    (Note)  POSITIVE for STREPTOCOCCUS SPECIES OTHER THAN pneumococcus, anginosus  group, S. pyogenes and S. agalactiae. Performed using Gifi  multiplex nucleic acid test. Final identification and antimicrobial  susceptibility testing will be verified by standard methods.    Specimen tested with Verigene multiplex, gram-positive blood culture  nucleic acid test for the following targets: Staph aureus, Staph  epidermidis, Staph lugdunensis, other Staph species, Enterococcus  faecalis, Enterococcus faecium, Streptococcus species, S. agalactiae,  S. anginosus grp., S. pneumoniae, S. pyogenes, Listeria sp., mecA  (methicillin resistance) and Fanny/B (vancomycin resistance).    Critical Value/Significant Value called to and read back by  Yousuf Traylor  RN 4/30/21 @ 0649 TF   No growth No growth     Most Recent 6 glucoses:  Recent Labs   Lab Test 05/01/21  0420 04/30/21  1630 04/30/21  0455 04/29/21  0942 03/23/21  0744 05/16/18  0650   * 161* 196* 71 90 73   ,   Results for orders placed or performed during the hospital encounter of 04/29/21   XR Chest Port 1 View    Narrative    CHEST ONE VIEW April 29, 2021 10:00 AM     HISTORY: Fever.    COMPARISON: None.      Impression    IMPRESSION: No acute disease.    KARIN GALLARDO MD   US Paracentesis    Narrative    US PARACENTESIS  4/29/2021 10:51 AM       HISTORY: High volume paracentesis with or without diagnostic fluid  analysis with labs to be drawn if ordered. Total paracentesis volume  as much as possible.    PROCEDURE:  Written informed consent was obtained from the patient  prior to the procedure. The risks and benefits including bleeding,  infection and abdominal organ injury were discussed and the patient  wished to continue. Initial ultrasound images demonstrated a large  right lower quadrant fluid collection. A permanent image was saved.  The skin overlying this collection was marked, prepped, draped and  anesthetized in usual sterile fashion utilizing 10 mL of lidocaine 1%.  The paracentesis catheter was then placed into the peritoneal fluid  collection with return of 3500 mL of yellow fluid. Estimated blood  loss during the procedure was less than 5 mL. No specimens collected.  Patient tolerated the procedure well. The patient will receive  intravenous albumin on the usual sliding scale as needed per protocol.       With continuous ultrasound guidance, 8 French needle and catheter  advanced into the ascites and ultrasound image was stored for  documentation.      Impression    IMPRESSION:  Ultrasound-guided paracentesis.  3.5 L removed. Sample  sent to the lab for evaluation.      NICK WU MD   CT Abdomen Pelvis w/o Contrast    Narrative    CT ABDOMEN PELVIS WITHOUT CONTRAST 4/29/2021 3:29  PM    CLINICAL HISTORY: Hypotension, septic shock.   TECHNIQUE: CT scan of the abdomen and pelvis was performed without IV  contrast. Multiplanar reformats were obtained. Dose reduction  techniques were used.  CONTRAST: None.    COMPARISON: MRI from February 4, 2021    FINDINGS:   LOWER CHEST: Minimal bibasilar atelectasis vs. less likely infiltrate  right worse than left. Trace pleural fluid.    HEPATOBILIARY: Cirrhotic-appearing liver morphology. Distended  gallbladder similar to previous. No calcified gallstones. Biliary  stent noted.    PANCREAS: Grossly unremarkable.    SPLEEN: Splenomegaly at 14.5 cm. No focal lesions.    ADRENAL GLANDS: Somewhat thickened bilaterally. No discrete nodules.    KIDNEYS/BLADDER: No significant mass, stones, or hydronephrosis.    BOWEL: No obstruction or inflammatory change.    LYMPH NODES: No gross lymphadenopathy in the absence of contrast.    VASCULATURE: No abdominal aortic aneurysm.    PELVIC ORGANS: No pelvic masses.    OTHER: Small to moderate amount of ascites. No free air.    MUSCULOSKELETAL: No suspicious bony lesions.      Impression    IMPRESSION:   1.  Small to moderate amount of ascites.  2.  Cirrhotic liver morphology and distended gallbladder similar to  previous. Biliary stent appears to extend from the left lobe to the  duodenum.  3.  No hydronephrosis or bowel obstruction demonstrated.    KARIN GALLARDO MD       Discharge Medications   Current Discharge Medication List      START taking these medications    Details   piperacillin-tazobactam (ZOSYN) 3-0.375 GM/50ML infusion Inject 50 mLs (3.375 g) into the vein every 6 hours  Qty:      Associated Diagnoses: Bacterial sepsis (H)         STOP taking these medications       calcium carbonate 600 mg-vitamin D 400 units (CALTRATE) 600-400 MG-UNIT per tablet Comments:   Reason for Stopping:         furosemide (LASIX) 20 MG tablet Comments:   Reason for Stopping:         lisinopril (ZESTRIL) 10 MG tablet Comments:    Reason for Stopping:         mesalamine (LIALDA) 1.2 g EC tablet Comments:   Reason for Stopping:         multivitamin (CENTRUM SILVER) tablet Comments:   Reason for Stopping:         nadolol (CORGARD) 20 MG tablet Comments:   Reason for Stopping:         potassium chloride ER (MICRO-K) 10 MEQ CR capsule Comments:   Reason for Stopping:         simvastatin (ZOCOR) 20 MG tablet Comments:   Reason for Stopping:         spironolactone (ALDACTONE) 50 MG tablet Comments:   Reason for Stopping:         ursodiol (ACTIGALL) 300 MG capsule Comments:   Reason for Stopping:             Allergies   Allergies   Allergen Reactions     Diagnostic X-Ray Materials Hives     PATIENT HAD HIVE REACTION AFTER ADMINISTERING CT CONTRAST DYE.       Contrast Dye

## 2021-05-02 NOTE — PLAN OF CARE
ICU End of Shift Summary.  For vital signs and complete assessments, please see documentation flowsheets.     Pertinent assessments: VSS, LS clear and satting well on RA, afebrile, tele SR/SB.  A&O x4.  Purewick in place with good UOP.  IVF infusing through femoral central line.  Removed arterial line.  No complaints of pain or nausea, no PRN's given.  Major Shift Events: NA  Plan (Upcoming Events): Remove central line, transfer to medical floor or [facility]?    Discharge/Transfer Needs:     Bedside Shift Report Completed :   Bedside Safety Check Completed:

## 2021-05-02 NOTE — PROGRESS NOTES
[facility]  Transfer Triage Note    Date of call: 05/02/21  Time of call: 3:36 PM    Is pandemic COVID-19 a concern? NO    Reason for transfer: Further diagnostic work up, management, and consultation for specialized care   Diagnosis: Septic shock, PSC w/ cirrhosis, SBP    Outside Records: Available  Additional records requested to be faxed to [phone].    Stability of Patient: Patient is at risk for instability, however patient requires urgent transfer and does not meet ICU criteria   ICU: No    Expected Time of Arrival for Transfer: 8-24 hours    Arrival Location:  [place] [address] Phone: [phone]    Recommendations for Management and Stabilization: Not needed    Additional Comments Pt is a 55 year old with hx of PSC with cirrhosis and ascites who presented with septic shock.  Was started on antiboitics, paracentesis consistent with SBP, and initial blood cultures with strep mitis.  Initially required pressors, and started on zosyn, and has clinically improved to the point that she is stable off pressors.  Asking for transfer for ongoing care.  Referring team is aware of bed limitations and will continue monitoring given ongoing clinical improvement.      Jim Guerrero MD

## 2021-05-02 NOTE — PROVIDER NOTIFICATION
Flagged for sepsis. Significant value Lactic acid 2.6 Thanks  MD called back. Patient is not Symptomatic, VSS. No changes at this time.

## 2021-05-02 NOTE — DISCHARGE SUMMARY
[facility]  Hospitalist Discharge Summary      Date of Admission:  4/29/2021  Date of Discharge:  4/30/2021  Discharging Provider: Vern Leblanc MD      Discharge Diagnoses       Septic shock-resolved    Streptococcus bacteremia-Streptococcus mitis group    Spontaneous bacterial peritonitis     Suspected hepatobiliary sepsis    Chronic liver disease with ascites requiring periodic paracentesis    Acute kidney injury-improving    Follow-ups Needed After Discharge   Follow-up Appointments     Follow Up and recommended labs and tests      Transfer to [facility] ICU team, consultation   with hepatologist Dr. Leventhal             Unresulted Labs Ordered in the Past 30 Days of this Admission     Date and Time Order Name Status Description    5/1/2021 0200 Blood culture Preliminary     4/29/2021 0951 Fluid Culture Aerobic Bacterial Preliminary     4/29/2021 0949 Blood culture Preliminary     4/29/2021 0949 Blood culture Preliminary       These results will be followed up by [facility] ICU team    Discharge Disposition   Transferred to [facility] ICU  Condition at discharge: Stable      Hospital Course      Patient is a 55-year-old lady with a history of primary sclerosing cholangitis.  She has a history of recurrent ascites and undergoes paracentesis on a regular basis.    She went get another paracentesis done  and was found to have a very low blood pressure in the 60s.  So she was sent to the [facility]  emergency room.  Patient also gives a history of low-grade fever at home.  But she denied any significant abdominal pain.     Her work-up in the emergency room showed significant leukocytosis of around 43,000, acute kidney injury, elevated LFTs.     She underwent paracentesis by IR in the emergency room.  The fluid analysis showed evidence of SBP based on cell counts.     Patient was started on IV Zosyn and she was  admitted to [facility] ICU         Patient was admitted to intensive care unit and closely monitored with IV antibiotic and norepinephrine support.  Blood culture obtained earlier grew Streptococcus species and culture and sensitivities pending.    Hypotension persisted and patient required albumin and hydrocortisone as well. Gastroenterology as well as intensive care medicine physician were consulted.    Patient was seen by gastroenterology team and I discussed with Dr. Shafer who recommended to transfer the patient to [facility] given patient's complex care requirement including  possible hepatobiliary intervention and evaluation for liver transplant.    Dr. Leventhal from [facility] hepatology unit was also contacted and he agreed with  transferring this patient to the [facility] ICU team for further care.  Dr. Leventhal will be consulting after admission to [facility].  Dr Perlman of ICU team at [facility] accepted patient to his care when bed is available.    On the day of transfer, patient was alert and oriented x3.  Patient has history of blood pressure 90s on a low-dose norepinephrine drip.     Patient remained at [facility] ICU due to lack of beds at the [place].  She remained in the ICU on Levophed for hypotension.    She can be transferred to [facility] ICU for close monitoring and treatment as planned yesterday.  Patient is not stable for medical floor due to tenuous blood pressure and septic shock and risk for deterioration.    Addendum  Patient's condition gradually improved and she was able to wean off Levophed. She became hemodynamically stable afebrile. She did not require ICU care. I spoke with [facility] hospitalist Dr. Robles who graciously accepted patient's care to medical telemetry bed today.  Right femoral line was removed. Patient was ambulatory. Plan to  continue IV Zosyn and consult GI/hepatology team at the [facility] when patient gets there.    Consultations This Hospital Stay   GASTROENTEROLOGY IP CONSULT  NEPHROLOGY IP CONSULT  ADVANCE DIRECTIVE IP CONSULT  INFECTIOUS DISEASES IP CONSULT  NUTRITION SERVICES ADULT IP CONSULT    Code Status   Full Code    Time Spent on this Encounter   I, Vern Leblanc MD, personally saw the patient today and spent greater than 30 minutes discharging this patient.       Vern Leblanc MD  [facility] ICU  [address] [facility] [place] [facility] [address]  Phone: [phone]  Fax: [phone]  ______________________________________________________________________    Physical Exam   Vital Signs: Temp: 97  F (36.1  C) Temp src: Axillary BP: 139/85 Pulse: 66   Resp: 24 SpO2: 97 % O2 Device: None (Room air)    Weight: 159 lbs 13.34 oz         Primary Care Physician   [facility]    Discharge Orders      Reason for your hospital stay    Severe sepsis with septic shock     Intake and output    Every shift     Activity - Up ad indy     Follow Up and recommended labs and tests    Transfer to [facility] ICU team, consultation with hepatologist Dr. Leventhal     Full Code     Advance Diet as Tolerated    Follow this diet upon discharge: Advance to a regular diet as tolerated       Significant Results and Procedures   Most Recent 3 CBC's:  Recent Labs   Lab Test 05/01/21  0420 04/30/21  0455 04/29/21  0942   WBC 27.8* 36.8* 43.2*   HGB 9.3* 9.7* 9.7*   MCV 90 90 90    434 427     Most Recent 3 BMP's:  Recent Labs   Lab Test 05/01/21  0420 04/30/21  1630 04/30/21  0455    136 129*   POTASSIUM 3.8 3.8 4.7   CHLORIDE 115* 110* 102   CO2 18* 17* 17*   BUN 44* 58* 73*   CR 0.89 1.20* 1.69*   ANIONGAP 7 9 10   MARIELENA 9.0 8.8 9.3   * 161* 196*     Most Recent 2 LFT's:  Recent Labs   Lab Test 05/01/21  0420 04/30/21  0455   * 198*   * 128*    ALKPHOS 472* 387*   BILITOTAL 2.1* 3.3*     Most Recent 3 INR's:  Recent Labs   Lab Test 05/01/21  0420 04/29/21  0815 03/23/21  0744   INR 1.60* 1.51* 1.39*     Most Recent 3 Troponin's:No lab results found.  Most Recent 3 BNP's:No lab results found.  Most Recent D-dimer:No lab results found.  Most Recent 6 Bacteria Isolates From Any Culture (See EPIC Reports for Culture Details):  Recent Labs   Lab Test 05/01/21  0855 04/29/21  1106 04/29/21  1027 04/29/21  0942 05/13/18  1145 05/13/18  1143   CULT No growth after 20 hours Cultured on the 1st day of incubation:  Streptococcus mitis group  Speciation in progress  *  Critical Value/Significant Value, preliminary result only, called to and read back by  Sue Gutierrez RN on 4.30.2021 at 0942. KVO    Susceptibility testing done on previous specimen  No growth Culture negative monitoring continues Cultured on the 1st day of incubation:  Streptococcus mitis group  Speciation in progress  *  Critical Value/Significant Value, preliminary result only, called to and read back by  Shahbaz Hill RN @ 0410 4/30/21 TM.    Susceptibility testing in progress  (Note)  POSITIVE for STREPTOCOCCUS SPECIES OTHER THAN pneumococcus, anginosus  group, S. pyogenes and S. agalactiae. Performed using [employer]  multiplex nucleic acid test. Final identification and antimicrobial  susceptibility testing will be verified by standard methods.    Specimen tested with [employer] multiplex, gram-positive blood culture  nucleic acid test for the following targets: Staph aureus, Staph  epidermidis, Staph lugdunensis, other Staph species, Enterococcus  faecalis, Enterococcus faecium, Streptococcus species, S. agalactiae,  S. anginosus grp., S. pneumoniae, S. pyogenes, Listeria sp., mecA  (methicillin resistance) and Fanny/B (vancomycin resistance).    Critical Value/Significant Value called to and read back by  Yousuf Traylor RN 4/30/21 @ 0649 TF   No growth No growth     Most Recent 6  glucoses:  Recent Labs   Lab Test 05/01/21  0420 04/30/21  1630 04/30/21  0455 04/29/21  0942 03/23/21  0744 05/16/18  0650   * 161* 196* 71 90 73   ,   Results for orders placed or performed during the hospital encounter of 04/29/21   XR Chest Port 1 View    Narrative    CHEST ONE VIEW April 29, 2021 10:00 AM     HISTORY: Fever.    COMPARISON: None.      Impression    IMPRESSION: No acute disease.    KARIN GALLARDO MD   US Paracentesis    Narrative    US PARACENTESIS  4/29/2021 10:51 AM       HISTORY: High volume paracentesis with or without diagnostic fluid  analysis with labs to be drawn if ordered. Total paracentesis volume  as much as possible.    PROCEDURE:  Written informed consent was obtained from the patient  prior to the procedure. The risks and benefits including bleeding,  infection and abdominal organ injury were discussed and the patient  wished to continue. Initial ultrasound images demonstrated a large  right lower quadrant fluid collection. A permanent image was saved.  The skin overlying this collection was marked, prepped, draped and  anesthetized in usual sterile fashion utilizing 10 mL of lidocaine 1%.  The paracentesis catheter was then placed into the peritoneal fluid  collection with return of 3500 mL of yellow fluid. Estimated blood  loss during the procedure was less than 5 mL. No specimens collected.  Patient tolerated the procedure well. The patient will receive  intravenous albumin on the usual sliding scale as needed per protocol.       With continuous ultrasound guidance, 8 French needle and catheter  advanced into the ascites and ultrasound image was stored for  documentation.      Impression    IMPRESSION:  Ultrasound-guided paracentesis.  3.5 L removed. Sample  sent to the lab for evaluation.      NICK WU MD   CT Abdomen Pelvis w/o Contrast    Narrative    CT ABDOMEN PELVIS WITHOUT CONTRAST 4/29/2021 3:29 PM    CLINICAL HISTORY: Hypotension, septic shock.   TECHNIQUE:  CT scan of the abdomen and pelvis was performed without IV  contrast. Multiplanar reformats were obtained. Dose reduction  techniques were used.  CONTRAST: None.    COMPARISON: MRI from February 4, 2021    FINDINGS:   LOWER CHEST: Minimal bibasilar atelectasis vs. less likely infiltrate  right worse than left. Trace pleural fluid.    HEPATOBILIARY: Cirrhotic-appearing liver morphology. Distended  gallbladder similar to previous. No calcified gallstones. Biliary  stent noted.    PANCREAS: Grossly unremarkable.    SPLEEN: Splenomegaly at 14.5 cm. No focal lesions.    ADRENAL GLANDS: Somewhat thickened bilaterally. No discrete nodules.    KIDNEYS/BLADDER: No significant mass, stones, or hydronephrosis.    BOWEL: No obstruction or inflammatory change.    LYMPH NODES: No gross lymphadenopathy in the absence of contrast.    VASCULATURE: No abdominal aortic aneurysm.    PELVIC ORGANS: No pelvic masses.    OTHER: Small to moderate amount of ascites. No free air.    MUSCULOSKELETAL: No suspicious bony lesions.      Impression    IMPRESSION:   1.  Small to moderate amount of ascites.  2.  Cirrhotic liver morphology and distended gallbladder similar to  previous. Biliary stent appears to extend from the left lobe to the  duodenum.  3.  No hydronephrosis or bowel obstruction demonstrated.    KARIN GALLARDO MD       Discharge Medications   Current Discharge Medication List      START taking these medications    Details   piperacillin-tazobactam (ZOSYN) 3-0.375 GM/50ML infusion Inject 50 mLs (3.375 g) into the vein every 6 hours  Qty:      Associated Diagnoses: Bacterial sepsis (H)         STOP taking these medications       calcium carbonate 600 mg-vitamin D 400 units (CALTRATE) 600-400 MG-UNIT per tablet Comments:   Reason for Stopping:         furosemide (LASIX) 20 MG tablet Comments:   Reason for Stopping:         lisinopril (ZESTRIL) 10 MG tablet Comments:   Reason for Stopping:         mesalamine (LIALDA) 1.2 g EC tablet  Comments:   Reason for Stopping:         multivitamin (CENTRUM SILVER) tablet Comments:   Reason for Stopping:         nadolol (CORGARD) 20 MG tablet Comments:   Reason for Stopping:         potassium chloride ER (MICRO-K) 10 MEQ CR capsule Comments:   Reason for Stopping:         simvastatin (ZOCOR) 20 MG tablet Comments:   Reason for Stopping:         spironolactone (ALDACTONE) 50 MG tablet Comments:   Reason for Stopping:         ursodiol (ACTIGALL) 300 MG capsule Comments:   Reason for Stopping:             Allergies   Allergies   Allergen Reactions     Diagnostic X-[employer] Materials Hives     PATIENT HAD HIVE REACTION AFTER ADMINISTERING CT CONTRAST DYE.       Contrast Dye

## 2021-05-02 NOTE — PROVIDER NOTIFICATION
Can pt have overflow orders?  Also, arterial line is very positional and inaccurate, can it be pulled?  Per , okay to pull arterial line, pt needs to stay ICU status per .

## 2021-05-02 NOTE — PROGRESS NOTES
Patient was seen and examined by me today. She is off Levophed since yesterday. She remained stable. She is afebrile without any significant symptoms. Patient can be downgraded to medical telemetry and can transfer to [facility] medical telemetry unit and instead  of intensive care unit. To that effect, I spoke with Dr. Robles at the [facility] who graciously accepted the care of this patient to his care and consult GI/hepatology team for further recommendations. Patient can be transferred to [facility] medical telemetry unit when bed is available.

## 2021-05-02 NOTE — PLAN OF CARE
Transferred from ICU 1615. A&O. Denies pain. LS Clear. Abdomen rounded/distended. Jaundiced. Independent with ambulation.

## 2021-05-03 ENCOUNTER — HOSPITAL ENCOUNTER (INPATIENT)
Facility: CLINIC | Age: 56
LOS: 9 days | Discharge: HOME | DRG: 444 | End: 2021-05-12
Attending: INTERNAL MEDICINE | Admitting: STUDENT IN AN ORGANIZED HEALTH CARE EDUCATION/TRAINING PROGRAM
Payer: COMMERCIAL

## 2021-05-03 ENCOUNTER — APPOINTMENT (OUTPATIENT)
Dept: ULTRASOUND IMAGING | Facility: CLINIC | Age: 56
DRG: 871 | End: 2021-05-03
Attending: PHYSICIAN ASSISTANT
Payer: COMMERCIAL

## 2021-05-03 VITALS
DIASTOLIC BLOOD PRESSURE: 66 MMHG | TEMPERATURE: 97.4 F | RESPIRATION RATE: 18 BRPM | HEIGHT: 65 IN | WEIGHT: 169.2 LBS | OXYGEN SATURATION: 100 % | SYSTOLIC BLOOD PRESSURE: 109 MMHG | BODY MASS INDEX: 28.19 KG/M2 | HEART RATE: 83 BPM

## 2021-05-03 DIAGNOSIS — K80.50 CHOLEDOCHOLITHIASIS: ICD-10-CM

## 2021-05-03 DIAGNOSIS — K83.09 CHOLANGITIS (H): ICD-10-CM

## 2021-05-03 DIAGNOSIS — K75.0 LIVER ABSCESS: ICD-10-CM

## 2021-05-03 DIAGNOSIS — I85.10 SECONDARY ESOPHAGEAL VARICES WITHOUT BLEEDING (H): ICD-10-CM

## 2021-05-03 DIAGNOSIS — A41.9 SEPSIS (H): ICD-10-CM

## 2021-05-03 DIAGNOSIS — K74.60 CIRRHOSIS OF LIVER WITH ASCITES, UNSPECIFIED HEPATIC CIRRHOSIS TYPE (H): ICD-10-CM

## 2021-05-03 DIAGNOSIS — K83.01 PSC (PRIMARY SCLEROSING CHOLANGITIS) (H): Primary | ICD-10-CM

## 2021-05-03 DIAGNOSIS — R18.8 CIRRHOSIS OF LIVER WITH ASCITES, UNSPECIFIED HEPATIC CIRRHOSIS TYPE (H): ICD-10-CM

## 2021-05-03 LAB
ALBUMIN FLD-MCNC: 0.3 G/DL
ALBUMIN SERPL-MCNC: 2.4 G/DL (ref 3.4–5)
ALBUMIN SERPL-MCNC: 2.5 G/DL (ref 3.4–5)
ALP SERPL-CCNC: 719 U/L (ref 40–150)
ALT SERPL W P-5'-P-CCNC: 150 U/L (ref 0–50)
APPEARANCE FLD: NORMAL
AST SERPL W P-5'-P-CCNC: 212 U/L (ref 0–45)
BACTERIA SPEC CULT: ABNORMAL
BILIRUB DIRECT SERPL-MCNC: 1.6 MG/DL (ref 0–0.2)
BILIRUB SERPL-MCNC: 1.9 MG/DL (ref 0.2–1.3)
COLOR FLD: YELLOW
GLUCOSE BLDC GLUCOMTR-MCNC: 108 MG/DL (ref 70–99)
GLUCOSE BLDC GLUCOMTR-MCNC: 90 MG/DL (ref 70–99)
GRAM STN SPEC: NORMAL
LABORATORY COMMENT REPORT: NORMAL
LYMPHOCYTES NFR FLD MANUAL: 32 %
MONOS+MACROS NFR FLD MANUAL: 36 %
NEUTS BAND NFR FLD MANUAL: 31 %
OTHER CELLS FLD MANUAL: 1 %
PROT FLD-MCNC: 0.9 G/DL
PROT SERPL-MCNC: 6.7 G/DL (ref 6.8–8.8)
SARS-COV-2 RNA RESP QL NAA+PROBE: NEGATIVE
SPECIMEN SOURCE FLD: NORMAL
SPECIMEN SOURCE: ABNORMAL
SPECIMEN SOURCE: ABNORMAL
SPECIMEN SOURCE: NORMAL
SPECIMEN SOURCE: NORMAL
WBC # FLD AUTO: 259 /UL

## 2021-05-03 PROCEDURE — 84157 ASSAY OF PROTEIN OTHER: CPT | Performed by: PHYSICIAN ASSISTANT

## 2021-05-03 PROCEDURE — 89051 BODY FLUID CELL COUNT: CPT | Performed by: PHYSICIAN ASSISTANT

## 2021-05-03 PROCEDURE — 87040 BLOOD CULTURE FOR BACTERIA: CPT | Performed by: STUDENT IN AN ORGANIZED HEALTH CARE EDUCATION/TRAINING PROGRAM

## 2021-05-03 PROCEDURE — 87015 SPECIMEN INFECT AGNT CONCNTJ: CPT | Performed by: PHYSICIAN ASSISTANT

## 2021-05-03 PROCEDURE — 999N001017 HC STATISTIC GLUCOSE BY METER IP

## 2021-05-03 PROCEDURE — 87070 CULTURE OTHR SPECIMN AEROBIC: CPT | Performed by: PHYSICIAN ASSISTANT

## 2021-05-03 PROCEDURE — 82040 ASSAY OF SERUM ALBUMIN: CPT | Performed by: PHYSICIAN ASSISTANT

## 2021-05-03 PROCEDURE — P9047 ALBUMIN (HUMAN), 25%, 50ML: HCPCS

## 2021-05-03 PROCEDURE — 99239 HOSP IP/OBS DSCHRG MGMT >30: CPT | Performed by: INTERNAL MEDICINE

## 2021-05-03 PROCEDURE — 99223 1ST HOSP IP/OBS HIGH 75: CPT | Mod: AI | Performed by: STUDENT IN AN ORGANIZED HEALTH CARE EDUCATION/TRAINING PROGRAM

## 2021-05-03 PROCEDURE — 250N000011 HC RX IP 250 OP 636: Performed by: INTERNAL MEDICINE

## 2021-05-03 PROCEDURE — 250N000013 HC RX MED GY IP 250 OP 250 PS 637: Performed by: STUDENT IN AN ORGANIZED HEALTH CARE EDUCATION/TRAINING PROGRAM

## 2021-05-03 PROCEDURE — 80076 HEPATIC FUNCTION PANEL: CPT | Performed by: INTERNAL MEDICINE

## 2021-05-03 PROCEDURE — U0003 INFECTIOUS AGENT DETECTION BY NUCLEIC ACID (DNA OR RNA); SEVERE ACUTE RESPIRATORY SYNDROME CORONAVIRUS 2 (SARS-COV-2) (CORONAVIRUS DISEASE [COVID-19]), AMPLIFIED PROBE TECHNIQUE, MAKING USE OF HIGH THROUGHPUT TECHNOLOGIES AS DESCRIBED BY CMS-2020-01-R: HCPCS | Performed by: STUDENT IN AN ORGANIZED HEALTH CARE EDUCATION/TRAINING PROGRAM

## 2021-05-03 PROCEDURE — 120N000011 HC R&B TRANSPLANT UMMC

## 2021-05-03 PROCEDURE — 250N000011 HC RX IP 250 OP 636

## 2021-05-03 PROCEDURE — 250N000011 HC RX IP 250 OP 636: Performed by: STUDENT IN AN ORGANIZED HEALTH CARE EDUCATION/TRAINING PROGRAM

## 2021-05-03 PROCEDURE — 99207 PR NO BILLABLE SERVICE THIS VISIT: CPT | Performed by: INTERNAL MEDICINE

## 2021-05-03 PROCEDURE — 82042 OTHER SOURCE ALBUMIN QUAN EA: CPT | Performed by: PHYSICIAN ASSISTANT

## 2021-05-03 PROCEDURE — 250N000009 HC RX 250: Performed by: RADIOLOGY

## 2021-05-03 PROCEDURE — 250N000013 HC RX MED GY IP 250 OP 250 PS 637: Performed by: INTERNAL MEDICINE

## 2021-05-03 PROCEDURE — U0005 INFEC AGEN DETEC AMPLI PROBE: HCPCS | Performed by: STUDENT IN AN ORGANIZED HEALTH CARE EDUCATION/TRAINING PROGRAM

## 2021-05-03 PROCEDURE — 36415 COLL VENOUS BLD VENIPUNCTURE: CPT | Performed by: PHYSICIAN ASSISTANT

## 2021-05-03 PROCEDURE — 49083 ABD PARACENTESIS W/IMAGING: CPT

## 2021-05-03 PROCEDURE — 0W9G3ZZ DRAINAGE OF PERITONEAL CAVITY, PERCUTANEOUS APPROACH: ICD-10-PCS | Performed by: RADIOLOGY

## 2021-05-03 PROCEDURE — 36415 COLL VENOUS BLD VENIPUNCTURE: CPT | Performed by: STUDENT IN AN ORGANIZED HEALTH CARE EDUCATION/TRAINING PROGRAM

## 2021-05-03 PROCEDURE — 87205 SMEAR GRAM STAIN: CPT | Performed by: PHYSICIAN ASSISTANT

## 2021-05-03 RX ORDER — FUROSEMIDE 20 MG
20 TABLET ORAL DAILY
Status: DISCONTINUED | OUTPATIENT
Start: 2021-05-04 | End: 2021-05-12 | Stop reason: HOSPADM

## 2021-05-03 RX ORDER — FUROSEMIDE 20 MG
20 TABLET ORAL DAILY
Status: DISCONTINUED | OUTPATIENT
Start: 2021-05-03 | End: 2021-05-03 | Stop reason: HOSPADM

## 2021-05-03 RX ORDER — ALBUMIN (HUMAN) 12.5 G/50ML
50 SOLUTION INTRAVENOUS ONCE
Status: COMPLETED | OUTPATIENT
Start: 2021-05-03 | End: 2021-05-03

## 2021-05-03 RX ORDER — PIPERACILLIN SODIUM, TAZOBACTAM SODIUM 3; .375 G/15ML; G/15ML
3.38 INJECTION, POWDER, LYOPHILIZED, FOR SOLUTION INTRAVENOUS EVERY 6 HOURS
Status: DISCONTINUED | OUTPATIENT
Start: 2021-05-03 | End: 2021-05-04

## 2021-05-03 RX ORDER — ONDANSETRON 2 MG/ML
4 INJECTION INTRAMUSCULAR; INTRAVENOUS EVERY 6 HOURS PRN
Status: DISCONTINUED | OUTPATIENT
Start: 2021-05-03 | End: 2021-05-12 | Stop reason: HOSPADM

## 2021-05-03 RX ORDER — URSODIOL 300 MG/1
300 CAPSULE ORAL 2 TIMES DAILY
COMMUNITY
End: 2021-06-17

## 2021-05-03 RX ORDER — LANOLIN ALCOHOL/MO/W.PET/CERES
3 CREAM (GRAM) TOPICAL
Status: DISCONTINUED | OUTPATIENT
Start: 2021-05-03 | End: 2021-05-12 | Stop reason: HOSPADM

## 2021-05-03 RX ORDER — ONDANSETRON 4 MG/1
4 TABLET, ORALLY DISINTEGRATING ORAL EVERY 6 HOURS PRN
Status: DISCONTINUED | OUTPATIENT
Start: 2021-05-03 | End: 2021-05-12 | Stop reason: HOSPADM

## 2021-05-03 RX ORDER — FUROSEMIDE 20 MG
20 TABLET ORAL DAILY
COMMUNITY
End: 2021-06-17

## 2021-05-03 RX ORDER — ALBUMIN (HUMAN) 12.5 G/50ML
SOLUTION INTRAVENOUS
Status: COMPLETED
Start: 2021-05-03 | End: 2021-05-03

## 2021-05-03 RX ORDER — BISACODYL 10 MG
10 SUPPOSITORY, RECTAL RECTAL DAILY PRN
Status: DISCONTINUED | OUTPATIENT
Start: 2021-05-03 | End: 2021-05-12 | Stop reason: HOSPADM

## 2021-05-03 RX ORDER — POTASSIUM CHLORIDE 750 MG/1
10 CAPSULE, EXTENDED RELEASE ORAL AT BEDTIME
COMMUNITY
End: 2021-06-21

## 2021-05-03 RX ORDER — SIMVASTATIN 20 MG
20 TABLET ORAL AT BEDTIME
Status: ON HOLD | COMMUNITY
End: 2021-09-09

## 2021-05-03 RX ORDER — MESALAMINE 1.2 G/1
2400 TABLET, DELAYED RELEASE ORAL
COMMUNITY
End: 2021-06-17

## 2021-05-03 RX ORDER — SPIRONOLACTONE 50 MG/1
50 TABLET, FILM COATED ORAL DAILY
COMMUNITY
End: 2021-06-21

## 2021-05-03 RX ORDER — SPIRONOLACTONE 50 MG/1
50 TABLET, FILM COATED ORAL DAILY
Status: DISCONTINUED | OUTPATIENT
Start: 2021-05-04 | End: 2021-05-12 | Stop reason: HOSPADM

## 2021-05-03 RX ORDER — LISINOPRIL 10 MG/1
10 TABLET ORAL DAILY
Status: ON HOLD | COMMUNITY
End: 2021-05-09

## 2021-05-03 RX ORDER — MULTIVIT WITH MINERALS/LUTEIN
1 TABLET ORAL DAILY
COMMUNITY

## 2021-05-03 RX ORDER — AMOXICILLIN 250 MG
2 CAPSULE ORAL 2 TIMES DAILY PRN
Status: DISCONTINUED | OUTPATIENT
Start: 2021-05-03 | End: 2021-05-12 | Stop reason: HOSPADM

## 2021-05-03 RX ORDER — NADOLOL 20 MG/1
60 TABLET ORAL DAILY
Status: ON HOLD | COMMUNITY
End: 2021-05-09

## 2021-05-03 RX ORDER — LIDOCAINE 40 MG/G
CREAM TOPICAL
Status: DISCONTINUED | OUTPATIENT
Start: 2021-05-03 | End: 2021-05-12 | Stop reason: HOSPADM

## 2021-05-03 RX ORDER — SPIRONOLACTONE 25 MG/1
50 TABLET ORAL DAILY
Status: DISCONTINUED | OUTPATIENT
Start: 2021-05-03 | End: 2021-05-03 | Stop reason: HOSPADM

## 2021-05-03 RX ADMIN — SPIRONOLACTONE 50 MG: 25 TABLET ORAL at 11:12

## 2021-05-03 RX ADMIN — ALBUMIN (HUMAN) 50 G: 12.5 SOLUTION INTRAVENOUS at 11:54

## 2021-05-03 RX ADMIN — TAZOBACTAM SODIUM AND PIPERACILLIN SODIUM 3.38 G: 375; 3 INJECTION, SOLUTION INTRAVENOUS at 09:45

## 2021-05-03 RX ADMIN — LIDOCAINE HYDROCHLORIDE 10 ML: 10 INJECTION, SOLUTION EPIDURAL; INFILTRATION; INTRACAUDAL; PERINEURAL at 12:01

## 2021-05-03 RX ADMIN — TAZOBACTAM SODIUM AND PIPERACILLIN SODIUM 3.38 G: 375; 3 INJECTION, SOLUTION INTRAVENOUS at 13:57

## 2021-05-03 RX ADMIN — TAZOBACTAM SODIUM AND PIPERACILLIN SODIUM 3.38 G: 375; 3 INJECTION, SOLUTION INTRAVENOUS at 01:23

## 2021-05-03 RX ADMIN — ALBUMIN HUMAN 50 G: 0.25 SOLUTION INTRAVENOUS at 11:54

## 2021-05-03 RX ADMIN — FUROSEMIDE 20 MG: 20 TABLET ORAL at 11:12

## 2021-05-03 RX ADMIN — PIPERACILLIN SODIUM AND TAZOBACTAM SODIUM 3.38 G: 3; .375 INJECTION, POWDER, LYOPHILIZED, FOR SOLUTION INTRAVENOUS at 20:19

## 2021-05-03 RX ADMIN — MELATONIN TAB 3 MG 3 MG: 3 TAB at 21:28

## 2021-05-03 ASSESSMENT — ACTIVITIES OF DAILY LIVING (ADL)
FALL_HISTORY_WITHIN_LAST_SIX_MONTHS: NO
TOILETING_ISSUES: NO
ADLS_ACUITY_SCORE: 13
DIFFICULTY_COMMUNICATING: NO
DIFFICULTY_EATING/SWALLOWING: NO
HEARING_DIFFICULTY_OR_DEAF: NO
ADLS_ACUITY_SCORE: 13
CONCENTRATING,_REMEMBERING_OR_MAKING_DECISIONS_DIFFICULTY: NO
WALKING_OR_CLIMBING_STAIRS_DIFFICULTY: NO
ADLS_ACUITY_SCORE: 14
DOING_ERRANDS_INDEPENDENTLY_DIFFICULTY: NO
ADLS_ACUITY_SCORE: 13
WEAR_GLASSES_OR_BLIND: YES
ADLS_ACUITY_SCORE: 13
ADLS_ACUITY_SCORE: 13
DRESSING/BATHING_DIFFICULTY: NO
PATIENT_/_FAMILY_COMMUNICATION_STYLE: SPOKEN LANGUAGE (ENGLISH OR BILINGUAL)

## 2021-05-03 ASSESSMENT — MIFFLIN-ST. JEOR: SCORE: 1353.37

## 2021-05-03 NOTE — PROGRESS NOTES
Paracentesis procedure completed by Dr. Solorio for total 5500 ml's yellow fluid.  Specimen to lab as ordered.  Pressure to site then sterile dressing.  Pt tolerated well.  Report called tp care RN.  Albumin 50 grams started per order Dr. Solorio per radiology protocol. Pt transported back to IP room per cart in stable alert condition.

## 2021-05-03 NOTE — PLAN OF CARE
AxOx 4, VSS. Pt denies pain, N/V, SOB, lightheadedness, and dizziness. Reports abdominal discomfort and distension. Pt has gained 10 lbs since 4/30 and is concerned; md aware, will divert question until the morning provider can see pt due to previous issues with hypotension. Trace edema to ankles/feet. Yellow sclera. Voiding without difficulty. Up independent. Plan for pt to transfer to [facility] when bed available.

## 2021-05-03 NOTE — PROGRESS NOTES
I was called for weight gain of 10 pounds in 4 days and asked if I want to resume PTA Lasix. SaO2 is 98 % on RA. She seems stable. In setting of sepsis or recent sepsis I will defer this question to the rounder.

## 2021-05-03 NOTE — PROGRESS NOTES
[facility]  Hospitalist Progress Note    Zafar Caraballo MD 05/03/2021  Text Page      Reason for Stay (Diagnosis): SBP         Assessment and Plan:      Summary of Stay: Patient is a 55-year-old lady with a history of primary sclerosing cholangitis.  She has a history of recurrent ascites and undergoes paracentesis on a regular basis.    She went get another paracentesis done  and was found to have a very low blood pressure in the 60s.  So she was sent to the  emergency room.  Patient also gives a history of low-grade fever at home.  But she denied any significant abdominal pain.     Her work-up in the emergency room showed significant leukocytosis of around 43,000, acute kidney injury, elevated LFTs.     She underwent paracentesis by IR in the emergency room.  The fluid analysis showed evidence of SBP based on cell counts.     Patient was started on IV Zosyn and she was admitted to [facility] ICU         Patient progress during stay: Patient was admitted to intensive care unit and closely monitored with IV antibiotic and norepinephrine.  Blood culture obtained earlier grew Streptococcus species.  Hypotension persisted and patient required albumin and hydrocortisone as well.  Gastroenterology as well as intensive care medicine physician were consulted.     Patient remained stable and Levophed was weaned off.  [facility] hospitalist service consulted for possible transfer to medical telemetry.  Dr Robles accepted the patient when bed is available.  Discharge summary and discharge instructions already completed.        Problem List with Assessment and Plan        1. Septic shock likely due to spontaneous bacterial peritonitis and Streptococcus bacteremia versus hepatobiliary sepsis    Resolved.  Patient is off pressor.  Will discontinue steroid.  Continue to monitor vitals and cultures    Patient is currently on Zosyn.  Infectious disease physician consulted and has been  following.    Procalcitonin is elevated 5.41 on April 30    Patient is afebrile, WBC count trending down.  Hypotension resolved.    Continue Zosyn, follow culture and sensitivity result        2. Acute kidney injury       Normal baseline kidney function.  Suspect this is due to septic shock.    Serum creatinine was 2.28 on admission, now normal at  0.89     Continue to monitor kidney function, intake and output monitoring and avoid nephrotoxic medications         3. Chronic end-stage liver disease due to primary sclerosing cholangitis with liver cirrhosis, recurrent ascites requiring paracentesis esophageal varices, biliary stent in place       Recheck hepatic enzymes tomorrow     GI is following and recommended  transfer to [facility] for further evaluation and treatment.  Patient has stent in place and will need replacement per GI.    She is being evaluated for potential liver transplant.     4.  Hyponatremia: Serum sodium is normal       5.  Ascites: Status post paracentesis.  3.5 L fluid removed  in the emergency department on April 29, 2021.   -She has gained about 10 pounds during her hospitalization and her abdomen is quite distended.  --She undergo paracentesis again today.  --Says her blood pressure is now stable and her renal function is stable we will resume her outpatient diuretic medications of Lasix 20 mg a day and spironolactone 50 mg a day.     6.  Severe malnutrition in context of acute illness on chronic underlying liver disease:  -- Patient was seen by dietitian and recommendation obtained including nutrition education, diet liberalization to 3 g of sodium.    7.  Hypertension:  --Continue to hold lisinopril and nadolol for now.  May resume a blood pressure continues to increase.     8.  Dyslipidemia:  --Holding statin for now due to worsened liver failure          DVT Prophylaxis: Pneumatic Compression Devices  Lines: PIV  [place]: None   code Status: Full  "Code  Disposition Plan   Expected transfer to the [facility] when bed available as described above.       Entered: Zafar Caraballo 05/03/2021, 1:02 PM     Incidental Findings/ Discharge Issues: None          Interval History (Subjective):      Abdomen feels very uncomfortable as it is bloated and distended.  She is not having severe pain, just bloating and fullness.                  Physical Exam:      Last Vital Signs:  BP (!) 144/74 (BP Location: Left arm)   Pulse 66   Temp 95.3  F (35.2  C) (Oral)   Resp 18   Ht 1.651 m (5' 5\")   Wt 76.7 kg (169 lb 3.2 oz)   SpO2 99%   BMI 28.16 kg/m      Weight 72.7 kg from 72.5 kg upon admission    Vital signs reviewed  General:  Alert, calm, NAD  CV: regular rate and rhythm, no murmurs or rubs  Lungs:  Clear to ascultation bilaterally, normal respiratory effort  HEENT:  Pupil round, equal, conjuctivae, sclerae and lids normal, neck is supple  Abdomen:  Soft, nontender, very distended, no masses, normal bowel sounds  Extremities:  No edema  Neuro: normal strength and sensation in all 4 extremities, cranial nerves grossly intact  Psychiatric:  Mood and affect within normal limits             Medications:      All current medications were reviewed with changes reflected in problem list.         Data:      All new lab and imaging data was reviewed.   Labs:    "

## 2021-05-03 NOTE — PLAN OF CARE
Vitals: Temp: 95.3  F (35.2  C) Temp src: Oral BP: (!) 144/74 Pulse: 66   Resp: 18 SpO2: 99 % O2 Device: None (Room air)    Neuro: Ao4, able to make needs known  Respiratory: LS clr, castro  Cardiac/tele: WDL  GI/: C/o abd fullness and discomfort this AM. Abd firm and distended. GI consulted and pt had paracentesis this AM. See notes  Skin: WDL ex jaundice  LDAs: PIV to right SL  Labs: BG 90  Diet: 2gm Na  Activity: Ind in room  Plan: Transfer to [facility] when bed available. Cont Zosyn. Will continue POC.

## 2021-05-03 NOTE — PROGRESS NOTES
Pt discharged today to [facility] via stretcher at 1745. VSS. Reviewed AVS and plan of care, no questions or concerns.     Report given to ADRIANA Samuel.

## 2021-05-03 NOTE — PROGRESS NOTES
"[employer]  Infectious Disease Progress Note          Assessment and Plan:   IMPRESSION:     1.  A 55-year-old female with acute sepsis, found to have streptococcal bacteremia.  Identification to follow, almost certainly coming from abdominal infection, either biliary related or spontaneous bacterial peritonitis.  2.  Sclerosing cholangitis, chronically abnormal liver function tests and has a stent in place, no major dilatation currently, but liver function test markedly more abnormal.  3.  Hepatic failure, on transplant list at the [place].  4.  Ascites, tap of fluid abnormal suggesting possible peritonitis, culture pending.  5.  Chronic ulcerative colitis.     RECOMMENDATIONS:     1.  On Zosyn covering broadly, of blood culture strep mitis, likely this is SBP  2.  Plan still looks like it is transfer from here to the [facility] when available.  We will follow while she is here and treat as needed. Likely narrow to ceftriaxone soon  Major fluid reaccumulated, retap lower WBC        Interval History:   no new complaints and doing better no cp, sob, n/v/d, or abd pain. 259 WBC retap, cxs as above T down              Medications:       furosemide  20 mg Oral Daily     piperacillin-tazobactam  3.375 g Intravenous Q6H     spironolactone  50 mg Oral Daily                  Physical Exam:   Blood pressure (!) 144/74, pulse 66, temperature 95.3  F (35.2  C), temperature source Oral, resp. rate 18, height 1.651 m (5' 5\"), weight 76.7 kg (169 lb 3.2 oz), SpO2 99 %.  Wt Readings from Last 2 Encounters:   05/03/21 76.7 kg (169 lb 3.2 oz)   02/22/21 72.6 kg (160 lb)     Vital Signs with Ranges  Temp:  [95.3  F (35.2  C)-98  F (36.7  C)] 95.3  F (35.2  C)  Pulse:  [61-77] 66  Resp:  [16-20] 18  BP: (115-155)/(72-96) 144/74  SpO2:  [98 %-100 %] 99 %    Constitutional: Awake, alert, cooperative, no apparent distress   Lungs: Clear to auscultation bilaterally, no crackles or wheezing "   Cardiovascular: Regular rate and rhythm, normal S1 and S2, and no murmur noted   Abdomen: Normal bowel sounds, soft, non-distended, non-tender   Skin: No rashes, no cyanosis, no edema   Other:           Data:   All microbiology laboratory data reviewed.  Recent Labs   Lab Test 05/02/21  1241 05/01/21  0420 04/30/21  0455   WBC 25.0* 27.8* 36.8*   HGB 11.5* 9.3* 9.7*   HCT 35.8 27.5* 28.0*   MCV 95 90 90    328 434     Recent Labs   Lab Test 05/01/21  0420 04/30/21  1630 04/30/21  0455   CR 0.89 1.20* 1.69*     No lab results found.  Recent Labs   Lab Test 05/01/21  0855 04/29/21  1106 04/29/21  1027 04/29/21  0942 05/13/18  1145 05/13/18  1143   CULT No growth after 2 days Cultured on the 1st day of incubation:  Streptococcus mitis  *  Critical Value/Significant Value, preliminary result only, called to and read back by  Sue Gutierrez RN on 4.30.2021 at 0942. KVO    Susceptibility testing done on previous specimen  No growth Culture negative monitoring continues Cultured on the 1st day of incubation:  Streptococcus mitis  *  Critical Value/Significant Value, preliminary result only, called to and read back by  Shahbaz Hill RN @ 0410 4/30/21 TM.    (Note)  POSITIVE for STREPTOCOCCUS SPECIES OTHER THAN pneumococcus, anginosus  group, S. pyogenes and S. agalactiae. Performed using [employer] nucleic acid test. Final identification and antimicrobial  susceptibility testing will be verified by standard methods.    Specimen tested with Verigene multiplex, gram-positive blood culture  nucleic acid test for the following targets: Staph aureus, Staph  epidermidis, Staph lugdunensis, other Staph species, Enterococcus  faecalis, Enterococcus faecium, Streptococcus species, S. agalactiae,  S. anginosus grp., S. pneumoniae, S. pyogenes, Listeria sp., mecA  (methicillin resistance) and Fanny/B (vancomycin resistance).    Critical Value/Significant Value called to and read back by  Yousuf Traylor RN  4/30/21 @ 0649 TF   No growth No growth

## 2021-05-03 NOTE — PROGRESS NOTES
[facility]  Transfer Triage Note    Date of call: 05/03/21  Time of call: 2:20 PM    Is pandemic COVID-19 a concern? NO    Reason for transfer: Further diagnostic work up, management, and consultation for specialized care   Diagnosis: PSC, cirrhosis, SBP, transplant evaluation patient     Outside Records: Available  Additional records requested to be faxed to [phone].    Stability of Patient: Patient is at risk for instability, however patient requires urgent transfer and does not meet ICU criteria   ICU: No    Expected Time of Arrival for Transfer: 0-8 hours    Arrival Location:  [place] [address] Phone: [phone]    Recommendations for Management and Stabilization: Given    Additional Comments:  Pt is a 55 year old with hx of PSC with cirrhosis, biliary stents in place, ascites, UC on mesalamine, pancreatic mucinous cystadenoma, undergoing liver transplant evaluation who presented to [facility] with septic shock due to S. Mitis bacteremia and SBP. Stable on Zosyn, off pressors since 5/1, BCx NGTD since 5/1.   Bed now available, called this afternoon for update from Dr. Caraballo prior to transfer. He relayed that patient would be appropriate for a med-surg bed, she is no longer septic and he feels she does not need tele anymore. Still unclear if biliary source or if stent exchange is needed at this time, our GI folks to weigh in on that. Repeat paracentesis today for comfort and labs (pending), 5L removed with symptomatic improvement. No hemodynamic changes afterwards, diuretics restarted today.     Wandy Santos MD

## 2021-05-03 NOTE — DISCHARGE SUMMARY
[facility]  Hospitalist Discharge Summary      Date of Admission:  4/29/2021  Date of Discharge:  No discharge date for patient encounter.  Discharging Provider: Zafar Caraballo MD      Discharge Diagnoses   Septic shock secondary to spontaneous bacterial peritonitis versus cholangitis  Primary sclerosing cholangitis with chronic liver failure  Ulcerative colitis  Ascites    Follow-ups Needed After Discharge   Follow-up Appointments     Follow Up and recommended labs and tests      Transfer to [facility] ICU team, consultation   with hepatologist Dr. Leventhal             Unresulted Labs Ordered in the Past 30 Days of this Admission     Date and Time Order Name Status Description    5/3/2021 1436 Hepatic panel In process     5/3/2021 1032 Fluid Culture Aerobic Bacterial In process     5/3/2021 1032 Gram stain In process     5/1/2021 0200 Blood culture Preliminary     4/29/2021 0951 Fluid Culture Aerobic Bacterial Preliminary           Discharge Disposition   Transferred to [facility]  Condition at discharge: Stable      Hospital Course   Patient is a 55-year-old lady with a history of primary sclerosing cholangitis.  She has a history of recurrent ascites.  She presented for outpatient paracentesis and was found to have a very low blood pressure in the 60s.  She was at the emergency department and mentioned a history of low-grade fever at home.  But she denied any significant abdominal pain.     Her work-up in the emergency room showed significant leukocytosis of around 43,000, acute kidney injury, elevated LFTs.     She underwent paracentesis by IR in the emergency room.  The fluid analysis showed evidence of SBP based on cell counts.     Patient was started on IV Zosyn and she was admitted to [facility] ICU       Berta was admitted to intensive care unit and treated with norepinephrine.  Both blood cultures obtained on 4/29 earlier grew Streptococcus  linden.  Hypotension persisted and patient required albumin and hydrocortisone as well.  Gastroenterology as well as intensive care medicine physician were consulted.  It was not completely clear if source of infection was spontaneous bacterial peritonitis or possibly cholangitis.  She does have biliary stent previously placed [facility].  Dr. Hardy for gastroenterology felt the patient would be best served at [place] due to concerns of possible infected biliary stent and that type of stent and equipment needed are not available at [facility].     She improved and Levophed was weaned off.  [facility] hospitalist service consulted for possible transfer to medical telemetry.  The gastroenterology team and hospitalist service graciously accepted Mrs. Lucero in transfer.  A bed became available today.        Problem List with Assessment and Plan        1. Septic shock likely due to spontaneous bacterial peritonitis and Streptococcus bacteremia versus hepatobiliary sepsis    Resolved.   Continue to monitor vitals and cultures    Patient is currently on Zosyn.  Infectious disease physician consulted and has been following.    Procalcitonin is elevated 5.41 on April 30    Patient is afebrile, WBC count trending down.  Hypotension resolved.    Continue Zosyn, follow culture and sensitivity result        2. Acute kidney injury       Normal baseline kidney function.  Suspect this is due to septic shock.    Serum creatinine was 2.28 on admission, now normal at  0.89     Continue to monitor kidney function, intake and output monitoring and avoid nephrotoxic medications         3. Chronic end-stage liver disease due to primary sclerosing cholangitis with liver cirrhosis, recurrent ascites requiring paracentesis esophageal varices, biliary stent in place       Labs pending.     GI is following and recommended  transfer to [facility] for further evaluation and  treatment.  Patient has stent in place and may need replacement per GI.    She is being evaluated for potential liver transplant.     4.  Hyponatremia: Serum sodium is normal       5.  Ascites: Status post paracentesis.  3.5 L fluid removed  in the emergency department on April 29, 2021.   -She has gained about 10 pounds during her hospitalization and her abdomen is quite distended.  --She undergo paracentesis again today. (This was completed and over 5 L were removed)  --Says her blood pressure is now stable and her renal function is stable we will resume her outpatient diuretic medications of Lasix 20 mg a day and spironolactone 50 mg a day.     6.  Severe malnutrition in context of acute illness on chronic underlying liver disease:  -- Patient was seen by dietitian and recommendation obtained including nutrition education, diet liberalization to 3 g of sodium.     7.  Hypertension:  --Continue to hold lisinopril and nadolol for now.  May resume a blood pressure continues to increase.     8.  Dyslipidemia:  --Holding statin for now due to worsened liver failure       Consultations This Hospital Stay   GASTROENTEROLOGY IP CONSULT  NEPHROLOGY IP CONSULT  ADVANCE DIRECTIVE IP CONSULT  INFECTIOUS DISEASES IP CONSULT  NUTRITION SERVICES ADULT IP CONSULT    Code Status   Full Code    Time Spent on this Encounter   I, Zafar Caraballo MD, personally saw the patient today and spent greater than 30 minutes discharging this patient.       Zafar Caraballo MD  [place] [address] [facility]  [address] [place] [address]  Phone: [phone]  Fax: [phone]  ______________________________________________________________________    Physical Exam   Vital Signs: Temp: 97.4  F (36.3  C) Temp src: Oral BP: 109/66 Pulse: 83   Resp: 18 SpO2: 100 % O2 Device: None (Room air)    Weight: 169 lbs 3.2 oz         Primary Care Physician   [facility]    Discharge Orders      Reason  for your hospital stay    Severe sepsis with septic shock     Intake and output    Every shift     Activity - Up ad indy     Follow Up and recommended labs and tests    Transfer to [facility] ICU team, consultation with hepatologist Dr. Leventhal     Full Code     Advance Diet as Tolerated    Follow this diet upon discharge: Advance to a regular diet as tolerated       Significant Results and Procedures   Most Recent 3 CBC's:  Recent Labs   Lab Test 05/02/21  1241 05/01/21  0420 04/30/21  0455   WBC 25.0* 27.8* 36.8*   HGB 11.5* 9.3* 9.7*   MCV 95 90 90    328 434     Most Recent 3 BMP's:  Recent Labs   Lab Test 05/01/21  0420 04/30/21  1630 04/30/21  0455    136 129*   POTASSIUM 3.8 3.8 4.7   CHLORIDE 115* 110* 102   CO2 18* 17* 17*   BUN 44* 58* 73*   CR 0.89 1.20* 1.69*   ANIONGAP 7 9 10   MARIELENA 9.0 8.8 9.3   * 161* 196*     Most Recent 2 LFT's:  Recent Labs   Lab Test 05/01/21  0420 04/30/21  0455   * 198*   * 128*   ALKPHOS 472* 387*   BILITOTAL 2.1* 3.3*     Most Recent 3 INR's:  Recent Labs   Lab Test 05/01/21  0420 04/29/21  0815 03/23/21  0744   INR 1.60* 1.51* 1.39*   ,   Results for orders placed or performed during the hospital encounter of 04/29/21   US Paracentesis    Narrative    US PARACENTESIS 5/3/2021 12:13 PM     HISTORY: HIGH VOLUME paracentesis with or without diagnostic fluid  analysis with labs to be drawn if ordered. Total paracentesis volume  as much as possible.    FINDINGS: Limited preprocedure ultrasound was performed, images show a  sufficient amount of ascites for paracentesis. An image was archived.  Written and oral informed consent was obtained. A pause for the cause  procedure to verify the correct patient and correct procedure. The  skin overlying the right lower quadrant was prepped and draped in the  usual sterile fashion. The subcutaneous tissues were anesthetized with  1% line. Under direct ultrasound guidance the  catheter was advanced  into the peritoneal space and 5.5 L of  straw colored fluid was  drained. The catheter was removed and a sterile dressing was applied.  There were no immediate complications. Ultrasound images were  permanently stored.  Patient left the ultrasound suite in satisfactory  condition.      Impression    IMPRESSION: Technically successful paracentesis without immediate  complications.    ANABEL NI, DO       Discharge Medications   Current Discharge Medication List      START taking these medications    Details   piperacillin-tazobactam (ZOSYN) 3-0.375 GM/50ML infusion Inject 50 mLs (3.375 g) into the vein every 6 hours  Qty:      Associated Diagnoses: Bacterial sepsis (H)         STOP taking these medications       calcium carbonate 600 mg-vitamin D 400 units (CALTRATE) 600-400 MG-UNIT per tablet Comments:   Reason for Stopping:         furosemide (LASIX) 20 MG tablet Comments:   Reason for Stopping:         lisinopril (ZESTRIL) 10 MG tablet Comments:   Reason for Stopping:         mesalamine (LIALDA) 1.2 g EC tablet Comments:   Reason for Stopping:         multivitamin (CENTRUM SILVER) tablet Comments:   Reason for Stopping:         nadolol (CORGARD) 20 MG tablet Comments:   Reason for Stopping:         potassium chloride ER (MICRO-K) 10 MEQ CR capsule Comments:   Reason for Stopping:         simvastatin (ZOCOR) 20 MG tablet Comments:   Reason for Stopping:         spironolactone (ALDACTONE) 50 MG tablet Comments:   Reason for Stopping:         ursodiol (ACTIGALL) 300 MG capsule Comments:   Reason for Stopping:             Allergies   Allergies   Allergen Reactions     Diagnostic X-Ray Materials Hives     PATIENT HAD HIVE REACTION AFTER ADMINISTERING CT CONTRAST DYE.       Contrast Dye

## 2021-05-03 NOTE — PROGRESS NOTES
"GASTROENTEROLOGY PROGRESS NOTE     SUBJECTIVE:  Reports abdomen very distended and uncomfortable. Eating but reports difficult due to abd bloating. Denies nausea, vomiting. Reports bloody stool yesterday morning but had 2 additional formed/brown stools after that. No documentation of rectal bleeding. No melena. No stools today.     Noted to be up 10 lbs since .      OBJECTIVE:  /86 (BP Location: Left arm)   Pulse 67   Temp 97.6  F (36.4  C) (Oral)   Resp 20   Ht 1.651 m (5' 5\")   Wt 76.7 kg (169 lb 3.2 oz)   SpO2 99%   BMI 28.16 kg/m    Temp (24hrs), Av.2  F (36.2  C), Min:95.3  F (35.2  C), Max:98  F (36.7  C)    Patient Vitals for the past 72 hrs:   Weight   21 0534 76.7 kg (169 lb 3.2 oz)   21 1944 76.9 kg (169 lb 8 oz)       Intake/Output Summary (Last 24 hours) at 5/3/2021 1053  Last data filed at 5/3/2021 0924  Gross per 24 hour   Intake 420 ml   Output --   Net 420 ml        PHYSICAL EXAM  Gen: alert, oriented, NAD, +mild jaundice  Abd: distended but not tight, mildly tender diffusely, +BS  Ext: trace - 1+ bilaterally  Neuro: no asterixis     Additional Comments:  ROS, FH, SH: See initial GI consult for details.     I have reviewed the patient's new clinical lab results:     Recent Labs   Lab Test 21  1241 21  0420 21  0455 21  0815 21  0815 21  0744   WBC 25.0* 27.8* 36.8*   < >  --  15.2*   HGB 11.5* 9.3* 9.7*   < >  --  10.7*   MCV 95 90 90   < >  --  88    328 434   < > 459* 483*   INR  --  1.60*  --   --  1.51* 1.39*    < > = values in this interval not displayed.     Recent Labs   Lab Test 21  0420 21  1630 21  0455   POTASSIUM 3.8 3.8 4.7   CHLORIDE 115* 110* 102   CO2 18* 17* 17*   BUN 44* 58* 73*   ANIONGAP 7 9 10     Recent Labs   Lab Test 21  0420 21  0455 21  1106 21  0942 21  0744 18  0935 18  0935   ALBUMIN 1.5*  --   --  1.5* 2.0*   < > 2.1*   BILITOTAL 2.1* " 3.3*  --  3.2* 3.9*   < > 3.4*   * 128*  --  143* 51*   < > 83*   * 198*  --  244* 75*   < > 119*   PROTEIN  --   --  50*  --   --   --   --    LIPASE  --   --   --   --   --   --  142    < > = values in this interval not displayed.     Imaging:    CT ABDOMEN PELVIS WITHOUT CONTRAST 4/29/2021 3:29 PM  IMPRESSION:   1.  Small to moderate amount of ascites.  2.  Cirrhotic liver morphology and distended gallbladder similar to  previous. Biliary stent appears to extend from the left lobe to the  duodenum.  3.  No hydronephrosis or bowel obstruction demonstrated.     Assessment/Plan:  55 year old female with complicated past medical history including primary sclerosing cholangitis with associated liver cirrhosis complicated by ascites, esophageal varices, s/p biliary stent placement, pancreatic mucinous cystadenoma, and ulcerative colitis, currently undergoing liver transplant evaluation through Dr. Leventhal at [facility], admitted 4/29 with symptoms of intermittent fevers, abdominal distension, weakness and found to be in septic shock with acute kidney injury and evidence of SBP.     Plans for patient to transfer to [facility] but bed not yet available. No update today so far.     1. PSC/liver cirrhosis, +SBP. Followed primarily by [facility]. Paracentesis 4/29 positive for SBP, most likely source of septic shock. Had biliary stent exchange for cholangitis in January and considered as potential source but less likely. Stent exchanged has been discussed but with clinical improvements, not deemed urgent at this time. WBC trending down. No fevers. ID following. Required pressor support on admission, but now stable. Diuretics had been held and restarted today 5/3. Abdomen with increasing distension. Reports some hematochezia yesterday but no ongoing rectal bleeding or melena. No encephalopathy.     Bilirubin at baseline on admission, trending down. Transaminases up from baseline on admit and also trending down.  Alk phos slowly increasing, off ursodiol. 5/1 MELD-na 14.     --Repeat paracentesis today with cell ct/diff, culture. IV albumin replacement.   --Continue abx per ID.   --Transfer to [facility] when bed available.   --No plans for biliary stent exchange at this time.     2. GONZALO. Resolved.   --Monitor renal function.     3. UC. Stable on mesalamine.     Reviewed with hospitalist, Dr. Caraballo. Will review with Dr. Jaquez.       Sharlene Burris, [facility] ([facility])

## 2021-05-04 ENCOUNTER — ANESTHESIA EVENT (OUTPATIENT)
Dept: SURGERY | Facility: CLINIC | Age: 56
DRG: 444 | End: 2021-05-04
Payer: COMMERCIAL

## 2021-05-04 ENCOUNTER — APPOINTMENT (OUTPATIENT)
Dept: OCCUPATIONAL THERAPY | Facility: CLINIC | Age: 56
DRG: 444 | End: 2021-05-04
Attending: STUDENT IN AN ORGANIZED HEALTH CARE EDUCATION/TRAINING PROGRAM
Payer: COMMERCIAL

## 2021-05-04 ENCOUNTER — APPOINTMENT (OUTPATIENT)
Dept: CARDIOLOGY | Facility: CLINIC | Age: 56
DRG: 444 | End: 2021-05-04
Attending: STUDENT IN AN ORGANIZED HEALTH CARE EDUCATION/TRAINING PROGRAM
Payer: COMMERCIAL

## 2021-05-04 ENCOUNTER — APPOINTMENT (OUTPATIENT)
Dept: ULTRASOUND IMAGING | Facility: CLINIC | Age: 56
DRG: 444 | End: 2021-05-04
Attending: STUDENT IN AN ORGANIZED HEALTH CARE EDUCATION/TRAINING PROGRAM
Payer: COMMERCIAL

## 2021-05-04 DIAGNOSIS — Z11.59 ENCOUNTER FOR SCREENING FOR OTHER VIRAL DISEASES: ICD-10-CM

## 2021-05-04 PROBLEM — K83.09 CHOLANGITIS (H): Status: ACTIVE | Noted: 2018-05-13

## 2021-05-04 PROBLEM — K80.50 CHOLEDOCHOLITHIASIS: Status: ACTIVE | Noted: 2021-04-30

## 2021-05-04 LAB
ALBUMIN SERPL-MCNC: 2.1 G/DL (ref 3.4–5)
ALP SERPL-CCNC: 737 U/L (ref 40–150)
ALT SERPL W P-5'-P-CCNC: 126 U/L (ref 0–50)
ANION GAP SERPL CALCULATED.3IONS-SCNC: 9 MMOL/L (ref 3–14)
AST SERPL W P-5'-P-CCNC: 153 U/L (ref 0–45)
BACTERIA SPEC CULT: NO GROWTH
BASOPHILS # BLD AUTO: 0 10E9/L (ref 0–0.2)
BASOPHILS NFR BLD AUTO: 0.2 %
BILIRUB SERPL-MCNC: 2.6 MG/DL (ref 0.2–1.3)
BUN SERPL-MCNC: 22 MG/DL (ref 7–30)
CALCIUM SERPL-MCNC: 9.2 MG/DL (ref 8.5–10.1)
CHLORIDE SERPL-SCNC: 112 MMOL/L (ref 94–109)
CO2 SERPL-SCNC: 18 MMOL/L (ref 20–32)
CREAT SERPL-MCNC: 0.74 MG/DL (ref 0.52–1.04)
DIFFERENTIAL METHOD BLD: ABNORMAL
EOSINOPHIL # BLD AUTO: 0.4 10E9/L (ref 0–0.7)
EOSINOPHIL NFR BLD AUTO: 2.2 %
ERYTHROCYTE [DISTWIDTH] IN BLOOD BY AUTOMATED COUNT: 16.2 % (ref 10–15)
GFR SERPL CREATININE-BSD FRML MDRD: 90 ML/MIN/{1.73_M2}
GLUCOSE SERPL-MCNC: 82 MG/DL (ref 70–99)
HCT VFR BLD AUTO: 33.3 % (ref 35–47)
HGB BLD-MCNC: 10.7 G/DL (ref 11.7–15.7)
IMM GRANULOCYTES # BLD: 0.4 10E9/L (ref 0–0.4)
IMM GRANULOCYTES NFR BLD: 2.4 %
INR PPP: 1.54 (ref 0.86–1.14)
LYMPHOCYTES # BLD AUTO: 1.8 10E9/L (ref 0.8–5.3)
LYMPHOCYTES NFR BLD AUTO: 10.3 %
MCH RBC QN AUTO: 30.1 PG (ref 26.5–33)
MCHC RBC AUTO-ENTMCNC: 32.1 G/DL (ref 31.5–36.5)
MCV RBC AUTO: 94 FL (ref 78–100)
MONOCYTES # BLD AUTO: 0.7 10E9/L (ref 0–1.3)
MONOCYTES NFR BLD AUTO: 3.9 %
NEUTROPHILS # BLD AUTO: 14.3 10E9/L (ref 1.6–8.3)
NEUTROPHILS NFR BLD AUTO: 81 %
NRBC # BLD AUTO: 0 10*3/UL
NRBC BLD AUTO-RTO: 0 /100
PLATELET # BLD AUTO: 203 10E9/L (ref 150–450)
POTASSIUM SERPL-SCNC: 3.4 MMOL/L (ref 3.4–5.3)
PROT SERPL-MCNC: 6.2 G/DL (ref 6.8–8.8)
RBC # BLD AUTO: 3.55 10E12/L (ref 3.8–5.2)
SODIUM SERPL-SCNC: 139 MMOL/L (ref 133–144)
SPECIMEN SOURCE: NORMAL
WBC # BLD AUTO: 17.6 10E9/L (ref 4–11)

## 2021-05-04 PROCEDURE — 250N000011 HC RX IP 250 OP 636: Performed by: STUDENT IN AN ORGANIZED HEALTH CARE EDUCATION/TRAINING PROGRAM

## 2021-05-04 PROCEDURE — 85025 COMPLETE CBC W/AUTO DIFF WBC: CPT | Performed by: STUDENT IN AN ORGANIZED HEALTH CARE EDUCATION/TRAINING PROGRAM

## 2021-05-04 PROCEDURE — 76705 ECHO EXAM OF ABDOMEN: CPT

## 2021-05-04 PROCEDURE — 120N000011 HC R&B TRANSPLANT UMMC

## 2021-05-04 PROCEDURE — 93306 TTE W/DOPPLER COMPLETE: CPT | Mod: 26 | Performed by: STUDENT IN AN ORGANIZED HEALTH CARE EDUCATION/TRAINING PROGRAM

## 2021-05-04 PROCEDURE — 97535 SELF CARE MNGMENT TRAINING: CPT | Mod: GO | Performed by: OCCUPATIONAL THERAPIST

## 2021-05-04 PROCEDURE — 99233 SBSQ HOSP IP/OBS HIGH 50: CPT | Performed by: STUDENT IN AN ORGANIZED HEALTH CARE EDUCATION/TRAINING PROGRAM

## 2021-05-04 PROCEDURE — 999N000128 HC STATISTIC PERIPHERAL IV START W/O US GUIDANCE

## 2021-05-04 PROCEDURE — 97165 OT EVAL LOW COMPLEX 30 MIN: CPT | Mod: GO | Performed by: OCCUPATIONAL THERAPIST

## 2021-05-04 PROCEDURE — 99254 IP/OBS CNSLTJ NEW/EST MOD 60: CPT | Performed by: NURSE PRACTITIONER

## 2021-05-04 PROCEDURE — 85610 PROTHROMBIN TIME: CPT | Performed by: STUDENT IN AN ORGANIZED HEALTH CARE EDUCATION/TRAINING PROGRAM

## 2021-05-04 PROCEDURE — 80053 COMPREHEN METABOLIC PANEL: CPT | Performed by: STUDENT IN AN ORGANIZED HEALTH CARE EDUCATION/TRAINING PROGRAM

## 2021-05-04 PROCEDURE — 250N000013 HC RX MED GY IP 250 OP 250 PS 637: Performed by: STUDENT IN AN ORGANIZED HEALTH CARE EDUCATION/TRAINING PROGRAM

## 2021-05-04 PROCEDURE — 93306 TTE W/DOPPLER COMPLETE: CPT

## 2021-05-04 PROCEDURE — 999N000147 HC STATISTIC PT IP EVAL DEFER

## 2021-05-04 PROCEDURE — 36415 COLL VENOUS BLD VENIPUNCTURE: CPT | Performed by: STUDENT IN AN ORGANIZED HEALTH CARE EDUCATION/TRAINING PROGRAM

## 2021-05-04 PROCEDURE — 76705 ECHO EXAM OF ABDOMEN: CPT | Mod: 26 | Performed by: RADIOLOGY

## 2021-05-04 PROCEDURE — 97530 THERAPEUTIC ACTIVITIES: CPT | Mod: GO | Performed by: OCCUPATIONAL THERAPIST

## 2021-05-04 RX ORDER — SIMVASTATIN 5 MG
20 TABLET ORAL AT BEDTIME
Status: DISCONTINUED | OUTPATIENT
Start: 2021-05-04 | End: 2021-05-12 | Stop reason: HOSPADM

## 2021-05-04 RX ORDER — MESALAMINE 1.2 G/1
2400 TABLET, DELAYED RELEASE ORAL
Status: DISCONTINUED | OUTPATIENT
Start: 2021-05-05 | End: 2021-05-12 | Stop reason: HOSPADM

## 2021-05-04 RX ORDER — CEFTRIAXONE 2 G/1
2 INJECTION, POWDER, FOR SOLUTION INTRAMUSCULAR; INTRAVENOUS EVERY 24 HOURS
Status: DISCONTINUED | OUTPATIENT
Start: 2021-05-04 | End: 2021-05-12 | Stop reason: HOSPADM

## 2021-05-04 RX ORDER — CEFAZOLIN SODIUM 2 G/100ML
2 INJECTION, SOLUTION INTRAVENOUS EVERY 8 HOURS
Status: DISCONTINUED | OUTPATIENT
Start: 2021-05-04 | End: 2021-05-04

## 2021-05-04 RX ORDER — URSODIOL 300 MG/1
300 CAPSULE ORAL 2 TIMES DAILY
Status: DISCONTINUED | OUTPATIENT
Start: 2021-05-04 | End: 2021-05-12 | Stop reason: HOSPADM

## 2021-05-04 RX ORDER — POTASSIUM CHLORIDE 750 MG/1
10 CAPSULE, EXTENDED RELEASE ORAL AT BEDTIME
Status: DISCONTINUED | OUTPATIENT
Start: 2021-05-04 | End: 2021-05-12 | Stop reason: HOSPADM

## 2021-05-04 RX ORDER — MULTIPLE VITAMINS W/ MINERALS TAB 9MG-400MCG
1 TAB ORAL DAILY
Status: DISCONTINUED | OUTPATIENT
Start: 2021-05-04 | End: 2021-05-12 | Stop reason: HOSPADM

## 2021-05-04 RX ADMIN — PIPERACILLIN SODIUM AND TAZOBACTAM SODIUM 3.38 G: 3; .375 INJECTION, POWDER, LYOPHILIZED, FOR SOLUTION INTRAVENOUS at 01:42

## 2021-05-04 RX ADMIN — URSODIOL 300 MG: 300 CAPSULE ORAL at 19:39

## 2021-05-04 RX ADMIN — CEFTRIAXONE 2 G: 2 INJECTION, POWDER, FOR SOLUTION INTRAMUSCULAR; INTRAVENOUS at 16:01

## 2021-05-04 RX ADMIN — POTASSIUM CHLORIDE 10 MEQ: 750 CAPSULE, EXTENDED RELEASE ORAL at 21:36

## 2021-05-04 RX ADMIN — Medication 1 TABLET: at 16:01

## 2021-05-04 RX ADMIN — SPIRONOLACTONE 50 MG: 50 TABLET, FILM COATED ORAL at 11:09

## 2021-05-04 RX ADMIN — PIPERACILLIN SODIUM AND TAZOBACTAM SODIUM 3.38 G: 3; .375 INJECTION, POWDER, LYOPHILIZED, FOR SOLUTION INTRAVENOUS at 07:07

## 2021-05-04 RX ADMIN — DOCUSATE SODIUM 50 MG AND SENNOSIDES 8.6 MG 2 TABLET: 8.6; 5 TABLET, FILM COATED ORAL at 19:51

## 2021-05-04 RX ADMIN — SIMVASTATIN 20 MG: 5 TABLET, FILM COATED ORAL at 21:36

## 2021-05-04 RX ADMIN — NADOLOL 60 MG: 40 TABLET ORAL at 16:00

## 2021-05-04 RX ADMIN — FUROSEMIDE 20 MG: 20 TABLET ORAL at 11:09

## 2021-05-04 ASSESSMENT — ACTIVITIES OF DAILY LIVING (ADL)
ADLS_ACUITY_SCORE: 13
ADLS_ACUITY_SCORE: 14
ADLS_ACUITY_SCORE: 13
ADLS_ACUITY_SCORE: 13

## 2021-05-04 NOTE — PROGRESS NOTES
Admitted/transferred from: OSH  2 RN full   skin assessment completed by Lizzie Larry RN and ADRIANA Caraballo.  Skin assessment finding: skin intact, no problems , PIV in R arm, dressing from old CVC in R femoral, dressing covering paracentesis site.   Interventions/actions: skin interventions standard precautions     Will continue to monitor.

## 2021-05-04 NOTE — PLAN OF CARE
7A - Per chart review and discussion with OT after evaluation, Pt ambulating with SBA-IND, noted path deviations but able to regain without true LOB or external intervention. Plan for OT to continue to follow to address activity tolerance as needed. Pt with no skilled inpatient PT needs, Will complete consult and defer evaluation, please reconsult as appropriate if patient has decline in functional mobility requiring further skilled inpatient PT needs.

## 2021-05-04 NOTE — CONSULTS
Hepatology Consultation    Berta Nava   MRN# [id number]     Age: 55 year old YOB: 1965       Referring provider: Shahbaz Hodges  Attending Hepatologist: Dr. 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 She was started on Zosyn.       Recommendations:   1. Strep mitis bacteremia with sepsis  - strep mitis + on 4/29, follow up negative 5/1, 5/3. pansensitive  - has been on zosyn since 4/29.  - more stable VS, did require pressor support at OSH  - low suspicion for cardiac vegetations given follow up cultures negative but in setting of liver transplant evaluation, please perform echocardiogram    2. SBP  3. Ascites  - initial para on 4/29 with 1094 PMN's, culture negative. Received 75 gm Albumin on 4/29, 50 gms on 5/3.   - remains on zosyn. Will need SBP ppx with ciprofloxacin 500 mg daily when abx completed.   - para as needed.   - has been on low dose diuretics with lasix 20 mg daily and spironolactone 50 mg daily- would not increase due to recent bacteremia/hypotension.     4. PSC  5. Biliary strictures/stones  - Labs to repeat- AFP, Ca19-9, ABO #2, treponema (for tx) Ordered.   - Last ERCP on 1/6 with partially occluded stent and stones that was replaced. Discussing with advanced endoscopy timing of possible repeat ERCP to exchange or remove stents. She does have a cystic area in liver with biliary dilation. No dominant stricture and prior Ca19-9 was 56.  - has been ursodiol 300 mg BID, may continue  - in transplant evaluation, MELD 14,  ABO AB  - will discuss briefly at transplant conference today   - US 5/4 without evidence of HCC    6. Esophageal varices  - EGD 12/1/20 with gr II-III EV. Not banded at that time. Has been on nadolol previously.   - If repeat ERCP performed, may need to consider banding if remains gr III  - no current signs of bleeding.     7. Immunizations  - please start HBV/HAV vaccination prior to discharge.     Plan of care discussed with Dr. Leventhal    Thank you for the opportunity to be involved in Berta Nava care. Please call with any questions or concerns.     Andria Rowe, APRN, CNP  [facility] [place]  Text link               History of Present Illness:   Berta Nava is a 55 year old female with a history of PSC causing cirrhosis. Her liver disease has been complicated by UC on mesalamine (no colitis on colon 12/2020), large EV- no hx banding or bleeding- previously on nadolol, ascites, biliary strictures with stones s/p sphincterotomy and stent placement (most recent 1/6/21), Sjogrens, HTN, EUS on lymph node (12/4/20) with granulomatous dx concerning for sarcoidosis, pancreatic mucinous cystadenoma who was admitted to an outside hospital on 4/29 with hypotension during paracentesis, fevers and lethargy and noted to have +strep mitis bacteremia and evidence of SBP. She was started on zosyn and follow up blood cultures have been negative (5/1, 5/3) and para with improvement in PMN's.     Ms. Nava is currently being evaluated for liver transplant given her complications of her liver disease. Since transfer, she has not had evidence of fevers and her abdominal fullness improved with recent para. No overt abdominal pain. She reports a week ago having a few bloody BM's but this resolved and she denies any hematemesis, nausea or vomiting. She has mild lower extremity edema that has been controlled with     She did have a drop in her oral intake due to her abdominal distention. Her first need for a para was  mid April and previously was on low dose diuretics. With her recent fatigue and decline, she has been working less and will be pursuing short term disability.              Past Medical History:     Past Medical History:   Diagnosis Date     Ascites      Biliary cirrhosis (H)      Cholangitis, sclerosing      Cirrhosis of liver with ascites (H) 3/3/2021     Hearing loss of left ear     wears a hearing aide     History of low potassium      Hyperlipidemia      Hypertension      SBP (spontaneous bacterial peritonitis) (H) 2021     Sjogren's syndrome (H)      Ulcerative colitis (H)      Ulcerative pancolitis (H)               Past Surgical History:     Past Surgical History:   Procedure Laterality Date     GYN SURGERY      bilat fallopian tubes and ovaries removed              Social History:     Social History     Tobacco Use     Smoking status: Never Smoker     Smokeless tobacco: Never Used   Substance Use Topics     Alcohol use: No     Comment: Last drink was 2017             Family History:   Denies a family history of overt liver disease.  Notes that her mother  of complications of an angiosarcoma.  Her father had coronary artery disease with a heart attack at the age of 46.  Ultimately he underwent a heart transplant and lived for 15 years, dying in his 70s.  There is no family history of ulcerative colitis, Crohn's disease.  Unclear history of thyroid disease.             Immunizations:     Immunization History   Administered Date(s) Administered     COVID-19,PF,Moderna 2021, 04/15/2021   tdap 14         Allergies:     Allergies   Allergen Reactions     Diagnostic X-Ray Materials Hives     PATIENT HAD HIVE REACTION AFTER ADMINISTERING CT CONTRAST DYE.       Contrast Dye              Medications:   @  Medications Prior to Admission   Medication Sig Dispense Refill Last Dose     calcium carbonate 600 mg-vitamin D 400 units (CALTRATE) 600-400 MG-UNIT per tablet Take 1 tablet by mouth daily    4/28/2021 at am     furosemide (LASIX) 20 MG tablet Take 20 mg by mouth daily   5/3/2021 at 1117     lisinopril (ZESTRIL) 10 MG tablet Take 10 mg by mouth daily   4/28/2021 at am     mesalamine (LIALDA) 1.2 g EC tablet Take 2,400 mg by mouth daily (with breakfast)   4/28/2021 at am     multivitamin (CENTRUM SILVER) tablet Take 1 tablet by mouth daily   4/28/2021 at am     nadolol (CORGARD) 20 MG tablet Take 60 mg by mouth daily   4/28/2021 at am     piperacillin-tazobactam (ZOSYN) 3-0.375 GM/50ML infusion Inject 50 mLs (3.375 g) into the vein every 6 hours   5/3/2021 at 1400     potassium chloride ER (MICRO-K) 10 MEQ CR capsule Take 10 mEq by mouth At Bedtime   4/28/2021 at pm     simvastatin (ZOCOR) 20 MG tablet Take 20 mg by mouth At Bedtime   4/28/2021 at pm     spironolactone (ALDACTONE) 50 MG tablet Take 50 mg by mouth daily   5/3/2021 at 1117     ursodiol (ACTIGALL) 300 MG capsule Take 300 mg by mouth 2 times daily   4/28/2021 at am   @          Review of Systems:    ROS: 10 point ROS neg other than the symptoms noted above in the HPI.          Physical Exam:   Blood pressure 109/73, pulse 88, temperature 98.1  F (36.7  C), temperature source Oral, resp. rate 18, weight 71.9 kg (158 lb 8 oz), SpO2 97 %. Body mass index is 26.38 kg/m .    General: In no acute distress, mild facial muscle wasting  Neuro: AOx3, No asterixis  HEENT: PERRL mild scleral icterus, Nooral lesions, no loose teeth, x1 broken tooth  Lymph:  Nocervical lymphadenoapthy  CV: S1/W6sszurgd murmurs, Skin warm and dry  Lungs: clear to auscultation Respirations even and nonlabored on room air  Abd: Mildly distended, nontender +BS  Extrem: +1 lower peripehral edema, R>L  Skin: mild jaundice  Psych: pleasant         Data:     Lab Results   Component Value Date    WBC 25.0 05/02/2021     Lab Results   Component Value Date    RBC 3.76 05/02/2021     Lab Results   Component Value Date    HGB 11.5 05/02/2021     Lab Results   Component Value Date     HCT 35.8 05/02/2021     No components found for: MCT  Lab Results   Component Value Date    MCV 95 05/02/2021     Lab Results   Component Value Date    MCH 30.6 05/02/2021     Lab Results   Component Value Date    MCHC 32.1 05/02/2021     Lab Results   Component Value Date    RDW 16.0 05/02/2021     Lab Results   Component Value Date     05/02/2021       Last Basic Metabolic Panel:  Lab Results   Component Value Date     05/01/2021      Lab Results   Component Value Date    POTASSIUM 3.8 05/01/2021     Lab Results   Component Value Date    CHLORIDE 115 05/01/2021     Lab Results   Component Value Date    MARIELENA 9.0 05/01/2021     Lab Results   Component Value Date    CO2 18 05/01/2021     Lab Results   Component Value Date    BUN 44 05/01/2021     Lab Results   Component Value Date    CR 0.89 05/01/2021     Lab Results   Component Value Date     05/01/2021       Liver Function Studies -   Recent Labs   Lab Test 05/03/21  1326   PROTTOTAL 6.7*   ALBUMIN 2.5*  2.4*   BILITOTAL 1.9*   ALKPHOS 719*   *   *       Lab Results   Component Value Date    INR 1.60 05/01/2021       MELD-Na score: 14 at 5/3/2021  1:26 PM  MELD score: 14 at 5/3/2021  1:26 PM  Calculated from:  Serum Creatinine: 0.89 mg/dL (Rounded to 1 mg/dL) at 5/1/2021  4:20 AM  Serum Sodium: 140 mmol/L (Rounded to 137 mmol/L) at 5/1/2021  4:20 AM  Total Bilirubin: 1.9 mg/dL at 5/3/2021  1:26 PM  INR(ratio): 1.60 at 5/1/2021  4:20 AM  Age: 55 years 8 months           Previous Endoscopy:     ERCP 1/6/21  Impression:          - One partially occluded stent from                        the left hepatic duct was seen in the                        major papilla. Removed.                       - Choledocholithiasis was found in                        the left intrahepatic duct with many                        stones and extensive pus. Partial                        removal was accomplished with balloon                         extraction; a stent was inserted.    ERCP 12/4/20  Impression:          - PSC with multiple ductal                        irregularities and stricture/dilation                        with stones on the left side, stented.                       - Choledocholithiasis was found in                        the common duct as well. Removal was                        accomplished with biliary                        sphincterotomy; a stent was inserted.    Colonoscopy 12/23/20  Impression:          - The examined portion of the ileum                        was normal.                       - Erythematous mucosa in the                        descending colon, in the transverse                        colon, at the hepatic flexure, in the                        ascending colon and in the cecum.                        Biopsied.                       - Normal mucosa in the rectum.                        Biopsied.                       - Internal hemorrhoids.  FINAL DIAGNOSIS A.  Colon, cecum, asc, biopsy:  Benign colonic mucosa, without active colitis or epithelial dysplasia    B.  Colon, transverse, biopsy:  Benign colonic mucosa, without active colitis or epithelial dysplasia    C.  Colon, descending, sig, biopsy:  Benign colonic mucosa, without active colitis or epithelial dysplasia    D.  Colon, rectum, biopsy:  Benign colonic mucosa, without active colitis or epithelial dysplasia       EGD 12/1/20  Impression:          EGD                       - Grade II and large (> 5 mm)                        esophageal varices.                       - Z-line regular, 40 cm from the                        incisors.                       - Portal hypertensive gastropathy.                       - Gastric erosions without bleeding.                        Biopsied.                       - Normal examined duodenum.                       EUS :exam focused on lymph node                        tissue acquisition                       - Many  abnormal lymph nodes were                        visualized in the subcarinal                        mediastinum (level 7), lower                        paraesophageal mediastinum (level                        8L), gastrohepatic ligament (level                        18), peripancreatic region, ranjit                        hepatis region and aortocaval region.                        Fine needle aspiration performed.                        Largest lymph nodes periportal or                        aortocaval, approximately 2-3 cm.                        Portal Lymph node aspirated for                        cytology, culture, flow cytometry                       - Hyperechoic material consistent                        with sludge was visualized                        endosonographically in the mid common                        bile duct, causing upstream dilation                        of it to up to 10 mm. Small amount of                        sludge in the gallbladder.                       - Endosonographic imaging of the                        pancreas showed sonographic changes                        consistent with mild-moderate chronic                        pancreatitis. Poorly visualized                        pancreas, specially tail                       - There was patchy abnormal                        echotexture in the visualized portion                        of the liver. This was characterized                        by a granular appearance, a                        heterogenous appearance, hyperechoic                        foci and a lobulated appearance.                       - Splenomegaly was found on                        endosonographic examination.                       - Small amount of Ascites was found                        on endosonographic examination of the                        peritoneal cavity.                       - Varices were visualized                         endosonographically in the                        gastroesophageal junction, in the                        middle third of the esophagus and in                        the lower third of the esophagus.                       - Varices were visualized                        endosonographically in the cardia of                        the stomach.  FINAL DIAGNOSIS A.  Stomach, biopsy:  Changes consistent with reactive gastropathy  Negative for Helicobacter pylori     FINAL DIAGNOSIS A.  Lymph node, portal, fine-needle aspiration:  Negative for malignancy  Noncaseating granulomatous inflammation present  Special stains to identify fungal and acid-fast organisms are negative         IMAGING:  No results found for this or any previous visit.  Results for orders placed during the hospital encounter of 04/29/21   XR Chest Port 1 View    Narrative CHEST ONE VIEW April 29, 2021 10:00 AM     HISTORY: Fever.    COMPARISON: None.      Impression IMPRESSION: No acute disease.    KARIN GALLARDO MD     Results for orders placed in visit on 03/23/21   US Abdomen Complete w Doppler Complete    Narrative ULTRASOUND ABDOMEN COMPLETE WITH DOPPLER 3/23/2021 7:33 AM    CLINICAL HISTORY: PSC (primary sclerosing cholangitis); Cirrhosis of  liver with ascites (H); Cirrhosis of liver with ascites (H); Cirrhosis  of liver with ascites, unspecified hepatic cirrhosis type (H);  Cirrhosis of liver with ascites, unspecified hepatic cirrhosis type  (H); Portal hypertension (H).     COMPARISONS: Abdominal ultrasound 5/13/2018, abdominal MRI 2/4/2021    REFERRING PROVIDER: THOMAS MICHAEL LEVENTHAL    TECHNIQUE: Abdominal viscera evaluated with grayscale imaging.  Inferior vena cava and hepatic vasculature evaluated with color  Doppler and Doppler waveform ultrasound.    FINDINGS:  Liver: Measures 10.9 cm. The hepatic parenchyma has a heterogeneous,  coarsened echotexture and there is a nodular contour of the hepatic  surface. No focal hepatic mass. No  intrahepatic biliary duct dilation.    Spleen: Enlarged, measuring 13.9 x 14.0 x 4.9 cm.    Right kidney: 10.3 cm. Normal. No mass, stone, or hydronephrosis.    Left kidney: 12.0 cm. Normal. No mass, stone, or hydronephrosis.    Aorta:        Proximal: 1.9 cm.       Mid: Obscured.       Distal: 1.6 cm.    Inferior vena cava: 2.7 cm.    Main portal vein diameter: 0.7 cm.    Ascites: Small to moderate ascites.    Gallbladder: The gallbladder wall is thickened, measuring 6.7 mm,  likely related to hepatic disease/ascites. Gallbladder sludge and  cholelithiasis. No pericholecystic fluid.    Common bile duct: 5.8 mm    Pancreas: Partially visualized. No abnormality demonstrated.    Splenic vein: 32 cm/s, retrograde  Main portal vein: 12 cm/s, antegrade  Right portal vein: 15 cm/s, antegrade  Left portal vein: 37 cm/s, retrograde    Left hepatic vein: 19 cm/s, antegrade  Middle hepatic vein: 28 cm/s, antegrade  Right hepatic vein: 89 cm/s, antegrade    Inferior vena cava: 170 cm/s, phasic    Hepatic artery: 152/43 cm/s  Right hepatic artery: 51/19 cm/s  Left hepatic artery: 127/35 cm/s    Recannulized periumbilical vein.      Impression IMPRESSION:   1. The liver is cirrhotic with sequelae of portal hypertension,  including ascites, splenomegaly, recanalization of the paraumbilical  vein, and retrograde flow in the splenic and left portal veins.    2. No focal hepatic mass.    3. Cholelithiasis and gallbladder sludge. There is gallbladder wall  thickening, which is likely related to cirrhosis/ascites.    4. Asymmetric kidneys, etiology not demonstrated.    I have personally reviewed the examination and initial interpretation  and I agree with the findings.    STEVE BLAND MD     CT abd wo 4/29/21  IMPRESSION:   1.  Small to moderate amount of ascites.  2.  Cirrhotic liver morphology and distended gallbladder similar to  previous. Biliary stent appears to extend from the left lobe to the  duodenum.  3.  No hydronephrosis or  bowel obstruction demonstrated.

## 2021-05-04 NOTE — PROGRESS NOTES
Temp: 98.6  F (37  C) Temp src: Oral BP: 121/81 Pulse: 70   Resp: 18 SpO2: 99 % O2 Device: None (Room air)        Pt Aox-4, vss, denies pain, no sob present. Pt walked with PT through the hallways today and did well. Pt is voiding just fine with no difficulties. Abdominal ultrasound and Echo done today with markedly coarsened liver and EF of 60-65% respectfully. Pt will have an ERCP tomorrow so they will be NPO starting at midnight. No further acute changes during shift, will continue with plan of care.

## 2021-05-04 NOTE — PLAN OF CARE
[id number]  Pt admitted from OSH for liver work up. SBP, pt had paracentesis at OSH today, paracentesis site CDI. Pt denies pain and nausea. Regular diet, NPO after midnight for abd US and GI consult. Pt up independently. Voiding. LBM yesterday. PIV SL'd between abx. A&Ox4. Calls appropriately.

## 2021-05-04 NOTE — PROGRESS NOTES
05/04/21 1100   Quick Adds   Type of Visit Initial Occupational Therapy Evaluation   Living Environment   People in home spouse   Current Living Arrangements house   Home Accessibility stairs to enter home;stairs within home   Number of Stairs, Main Entrance 2   Stair Railings, Main Entrance none   Number of Stairs, Within Home, Primary   (7 up 7 down, split)   Stair Railings, Within Home, Primary railings safe and in good condition  (one side)   Transportation Anticipated car, drives self;family or friend will provide   Self-Care   Usual Activity Tolerance good   Current Activity Tolerance moderate   Equipment Currently Used at Home none   Disability/Function   Hearing Difficulty or Deaf no   Wear Glasses or Blind yes   Vision Management glassees   Concentrating, Remembering or Making Decisions Difficulty no   Difficulty Communicating no   Difficulty Eating/Swallowing no   Walking or Climbing Stairs Difficulty no   Dressing/Bathing Difficulty no   Toileting issues no   Doing Errands Independently Difficulty (such as shopping) no   Fall history within last six months no   General Information   Onset of Illness/Injury or Date of Surgery 05/03/21   Referring Physician Shahbaz Weldon MD   Patient/Family Therapy Goal Statement (OT) to discharge home   Additional Occupational Profile Info/Pertinent History of Current Problem Patient is a 55-year-old lady with a history of primary sclerosing cholangitis.  She has a history of recurrent ascites.  She presented for outpatient paracentesis and was found to have a very low blood pressure in the 60s.  She was at the emergency department and mentioned a history of low-grade fever at home.  But she denied any significant abdominal pain.   Existing Precautions/Restrictions no known precautions/restrictions   Cognitive Status Examination   Cognitive Status Comments OT: No concerns at this time   Range of Motion Comprehensive   Comment, General Range of Motion OT: ESTUARDO WILSON    Strength Comprehensive (MMT)   Comment, General Manual Muscle Testing (MMT) Assessment OT: Overall deconditioned   Muscle Tone Assessment   Muscle Tone Comments OT: Overall deconditioned   Bed Mobility   Comment (Bed Mobility) OT: ind   Transfers   Transfer Comments OT: SBA   Balance   Balance Comments OT: SBA   Instrumental Activities of Daily Living (IADL)   IADL Comments OT: Pt was ind spouse can A prn   Clinical Impression   Criteria for Skilled Therapeutic Interventions Met (OT) yes   OT Diagnosis decreased ind in I/ADLS   OT Problem List-Impairments impacting ADL problems related to;activity tolerance impaired;strength   ADL comments/analysis decreased ind in I/ADLS   Assessment of Occupational Performance 1-3 Performance Deficits   Planned Therapy Interventions (OT) ADL retraining;IADL retraining;balance training;bed mobility training;strengthening;transfer training;home program guidelines;progressive activity/exercise   Clinical Decision Making Complexity (OT) low complexity   Therapy Frequency (OT) 3x/week   Predicted Duration of Therapy 5/21/21   Risk & Benefits of therapy have been explained evaluation/treatment results reviewed;care plan/treatment goals reviewed;patient   OT Discharge Planning    OT Discharge Recommendation (DC Rec) Home with assist   OT Rationale for DC Rec A prn for spouse for I/ADLS pending functional endurance and higher level balance progression   Total Evaluation Time (Minutes)   Total Evaluation Time (Minutes) 4

## 2021-05-04 NOTE — PROGRESS NOTES
"CLINICAL NUTRITION SERVICES - ASSESSMENT NOTE     Nutrition Prescription    RECOMMENDATIONS FOR MDs/PROVIDERS TO ORDER:  Avoid interruptions to diet order as much as possible.     Malnutrition Status:    Severe malnutrition in the context of acute on chronic illness    Recommendations already ordered by Registered Dietitian (RD):  Ensure Enlive Shake for HS snack time    Future/Additional Recommendations:  Continue low sodium diet.   Encourage small/frequent meals with focus on protein sources  Encourage evening snack with CHO/protein  Oral supplements as tolerated      REASON FOR ASSESSMENT  Berta Nava is a/an 55 year old female assessed by the dietitian for Provider Order - assess malnutrition    NUTRITION HISTORY  Patient reports reduced PO intake in the last 2 weeks d/t increasing abdominal distention/discomfort.      She typically eats a low sodium, high protein diet.  She does not eat much meat and avoids cow's milk d/t suspicion for lactose intolerance.  Does still eat yogurt, cheese and ice cream at times.  Her main foods are cereal, yogurt, applesauce, beans, eggs, fruit.  She takes Premier Protein at home, however at [facility] was receiving Ensure Enlive Shakes (which contain ice cream) and she enjoyed the taste and felt like she tolerated them well.  She does get full quickly and has a reduced appetite overall.      She tends towards constipation, but reports feeling like the diuretics held her go.      CURRENT NUTRITION ORDERS  Diet: 2 g Sodium  Intake/Tolerance: Diet just advanced, waiting for her first meal.     LABS  Labs reviewed    MEDICATIONS  MVI with minerals  Lasix  Aldactone    ANTHROPOMETRICS  Height: 5'5\"  Most Recent Weight: 71.9 kg (158 lb 8 oz)    IBW: 56.8 kg  BMI: Overweight BMI 25-29.9  Weight History:  Weight loss on 5/3 was after paracentesis.  Also, diuretics started 5/3.  Continue to monitor trends. She reports her lowest weight being 151# in April 2021.    Wt Readings from Last " 15 Encounters:   05/03/21 71.9 kg (158 lb 8 oz)   05/03/21 76.7 kg (169 lb 3.2 oz)   02/22/21 72.6 kg (160 lb)   05/17/18 74.6 kg (164 lb 6.4 oz)   Dosing Weight: 61 kg (adj wt based on IBW of 56.8 kg and actual wt of 71.9 kg)    ASSESSED NUTRITION NEEDS  Estimated Energy Needs: 8224-9290 kcals/day (25 - 30 kcals/kg)  Justification: Maintenance  Estimated Protein Needs: 73-92 grams protein/day (1.2- 1.5 grams of pro/kg)  Justification: Increased needs  Estimated Fluid Needs: per MD    PHYSICAL FINDINGS  See malnutrition section below.    MALNUTRITION  % Intake: </= 50% for >/= 5 days (severe)  % Weight Loss: Difficult to assess d/t frequent fluctuations with fluid status changes  Subcutaneous Fat Loss: Facial region (orbital): mild, Lower arm: mild and Thoracic/intercostal: mild  Muscle Loss: Temporal:  mild, Facial & jaw region: moderate, Scapular bone: moderate, Upper arm (bicep, tricep): moderate and Posterior calf: moderate  Fluid Accumulation/Edema: Does not meet criteria  Malnutrition Diagnosis: Severe malnutrition in the context of acute on chronic illness    NUTRITION DIAGNOSIS  Inadequate oral intake related to early satiety, reduced appetite as evidenced by muscle wasting, patient report.       INTERVENTIONS  Implementation  Nutrition Education: Provided education on oral supplements, muscle wasting.  Encouraged small, frequent meals with particular focus on an evening snack containing CHO/protein.     Medical food supplement therapy     Goals  Patient to consume % of nutritionally adequate meal trays TID, or the equivalent with supplements/snacks.     Monitoring/Evaluation  Progress toward goals will be monitored and evaluated per protocol.    Adelita De Luna MS, RD, LD  Pager [phone]

## 2021-05-04 NOTE — PHARMACY-ADMISSION MEDICATION HISTORY
Admission Medication History Completed by Pharmacy    See [facility] Admission Navigator for allergy information, preferred outpatient pharmacy, prior to admission medications and immunization status.     Medication History Sources:     Medication history note completed by pharmacy at [facility] on 4/29/21     Discharge summary / progress notes  from [facility]     Fill history     MAR from [facility]     Changes made to PTA medication list (reason):    Added: all medications except for zosyn     Deleted: none    Changed: None    Additional Information:    Patient was direct transfer from [facility] - admitted there 4/29/21     All of patient's PTA medications were discontinued and removed from PTA list upon transfer however notes indication her PTA medications were to be resumed when patient was clinically appropriated. Therefore added back to PTA list.     Patient's spironolactone and furosemide were resumed today and patient received a dose prior to transfer; all other PTA medications have been held while at [facility]     Prior to Admission medications    Medication Sig Last Dose Taking? Auth Provider   calcium carbonate 600 mg-vitamin D 400 units (CALTRATE) 600-400 MG-UNIT per tablet Take 1 tablet by mouth daily 4/28/2021 at am Yes Unknown, Entered By History   furosemide (LASIX) 20 MG tablet Take 20 mg by mouth daily 5/3/2021 at 1117 Yes Unknown, Entered By History   lisinopril (ZESTRIL) 10 MG tablet Take 10 mg by mouth daily 4/28/2021 at am Yes Unknown, Entered By History   mesalamine (LIALDA) 1.2 g EC tablet Take 2,400 mg by mouth daily (with breakfast) 4/28/2021 at am Yes Unknown, Entered By History   multivitamin (CENTRUM SILVER) tablet Take 1 tablet by mouth daily 4/28/2021 at am Yes Unknown, Entered By History   nadolol (CORGARD) 20 MG tablet Take 60 mg by mouth daily 4/28/2021 at am Yes Unknown, Entered By History   piperacillin-tazobactam (ZOSYN) 3-0.375 GM/50ML infusion Inject 50 mLs (3.375 g) into the vein  every 6 hours 5/3/2021 at 1400 Yes Vern Leblanc MD   potassium chloride ER (MICRO-K) 10 MEQ CR capsule Take 10 mEq by mouth At Bedtime 4/28/2021 at pm Yes Unknown, Entered By History   simvastatin (ZOCOR) 20 MG tablet Take 20 mg by mouth At Bedtime 4/28/2021 at pm Yes Unknown, Entered By History   spironolactone (ALDACTONE) 50 MG tablet Take 50 mg by mouth daily 5/3/2021 at 1117 Yes Unknown, Entered By History   ursodiol (ACTIGALL) 300 MG capsule Take 300 mg by mouth 2 times daily 4/28/2021 at am Yes Unknown, Entered By History       Date completed: 05/03/21    Medication history completed by: Honey Reynolds, PharmD

## 2021-05-04 NOTE — H&P
[facility]    History and Physical - Hospitalist Service, [place]       Date of Admission:  5/3/2021    Assessment & Plan   Acute decompensated cirrhosis likely secondary to SBP  Primary sclerosing cholangitis with chronic cirrhosis  Recurrent ascites  History of esophageal varices  Worsening LFTs  -Admitted to [facility] 4/29 with septic shock from strep mitis bacteremia and SBP which is the most likely cause for decompensated cirrhosis.  -Paracentesis 4/29 with 1610 WBCs, and 60% neutrophils.  -Has been maintained on IV Zosyn; this will be continued at this time.  -Diuretic therapy reinitiated 5/3 after normalization of renal function; Lasix 20 mg daily, spironolactone 50 mg daily.  -No evidence of encephalopathy or other infection.  -Possibly some hematochezia day prior to transfer but no other obvious evidence of GI bleeding.  -We will appreciate hepatology consultation in the morning.  -We will also talk to luminal GI tomorrow in regards to biliary stent in possible stent exchange however at this time it seems less likely that this is cholangitis.  -Daily MELD labs.  -Patient is currently listed for transplant per GI notes at [facility].  -5 L removed via paracentesis 4/29, 5.5L removed via paracentesis 5/3. Was last given 50 g albumin IV after para 5/3.  -Bilirubin is improved to 1.9 from 2.1 however alk phos has nearly doubled to 719 from 472 and ALT AST are mildly worse today.  Her abdominal exam remains benign.  Given these findings will repeat abdominal ultrasound tomorrow morning while patient is n.p.o.    Spontaneous bacterial peritonitis  Septic shock, resolved  Strep mitis bacteremia  -Required Levophed for short duration at [facility].  -Continue Zosyn.  -Follow cultures.    Possible hematochezia  -Patient notes some blood in the toilet 2 days prior to transfer but no stool output in the last 36 hours.  -She endorses feeling somewhat  constipated.  -Overall I have lower concern for GI bleed at present and think that she is actually constipated.  -As needed senna and Dulcolax.    GONZALO, resolved  -Likely related to septic shock.    Ulcerative colitis  -Continue PTA mesalamine.    Severe malnutrition, suspected  -Nutrition consult.    Hypertension  -Hold PTA lisinopril and nadolol.    Hyperlipidemia  -Hold PTA statin.         Diet: NPO for Medical/Clinical Reasons Except for: Meds    DVT Prophylaxis: Pneumatic Compression Devices  Jurado Catheter: not present  Code Status: Full Code           Disposition Plan   Expected discharge: 4 - 7 days, recommended to Prior living arrangement which was home once adequate pain management/ tolerating PO medications, antibiotic plan established and safe disposition plan/ TCU bed available.  Entered: Shahbaz Weldon MD 05/03/2021, 7:00 PM     The patient's care was discussed with the Patient.    Shahbaz Weldon MD  [facility]  Contact information available via [facility] Paging/Directory  Please see sign in/sign out for up to date coverage information    ______________________________________________________________________    Chief Complaint   Hypotension, not feeling well, fever, lethargy, increased stool output    History is obtained from the patient  And chart review    History of Present Illness   Berta Nava is a 55 year old female who was initially admitted to [facility] on 4/29/2021 with feelings of malaise and generalized fatigue over the preceding 10 to 14 days prior to admission.  Prior to this she had been doing well in the last 1-2 months at her home in [place].  On admission on 4/29 the patient was found to be hypotensive with systolic blood pressure in the 60s and was admitted to the intensive care unit with septic shock.  She also had a leukocytosis to 43,000, acute kidney injury and elevated LFTs.  Blood cultures had fever which is grew strep  mitis that was thought to be either secondary to cholangitis or spontaneous bacterial peritonitis.  She was managed on IV Zosyn.  While at [facility] the patient was seen in consultation by infectious disease and gastroenterology.  She was weaned off of pressors and diuretic therapy was restarted given normalization of blood pressure and renal function and overall anasarca.  Patient does have a biliary stent that was placed in January in the setting of cholangitis, this stent was not exchanged given clinical improvement.    On interview this evening, the patient feels overall much better.  She denies abdominal pain following her paracentesis before transfer.  They took off 5 L.  She endorses anorexia and generally poor appetite but does not endorse significant nausea or vomiting.  She does endorse some leg swelling with the left leg generally being a little more swollen than the right.  She endorses good urine output.  She denies pain and generally feels much better than she did on admission on 4/29.    Review of Systems    The 10 point Review of Systems is negative other than noted in the HPI or here.     Past Medical History    I have reviewed this patient's medical history and updated it with pertinent information if needed.   Past Medical History:   Diagnosis Date     Ascites      Biliary cirrhosis (H)      Cholangitis, sclerosing      Cirrhosis of liver with ascites (H) 3/3/2021     Hearing loss of left ear     wears a hearing aide     History of low potassium      Hyperlipidemia      Hypertension      SBP (spontaneous bacterial peritonitis) (H) 4/30/2021     Sjogren's syndrome (H)      Ulcerative colitis (H)      Ulcerative pancolitis (H)        Past Surgical History   I have reviewed this patient's surgical history and updated it with pertinent information if needed.  Past Surgical History:   Procedure Laterality Date     GYN SURGERY      bilat fallopian tubes and ovaries removed       Social History   I  have reviewed this patient's social history and updated it with pertinent information if needed.  Social History     Tobacco Use     Smoking status: Never Smoker     Smokeless tobacco: Never Used   Substance Use Topics     Alcohol use: No     Comment: Last drink was 2017     Drug use: No   Denies tobacco or alcohol use  Works as an   Lives in [place]  Is  with children    Family History   Patient denies history of liver disease in the family    Prior to Admission Medications   Prior to Admission Medications   Prescriptions Last Dose Informant Patient Reported? Taking?   piperacillin-tazobactam (ZOSYN) 3-0.375 GM/50ML infusion   No No   Sig: Inject 50 mLs (3.375 g) into the vein every 6 hours      Facility-Administered Medications: None     Allergies   Allergies   Allergen Reactions     Diagnostic X-Ray Materials Hives     PATIENT HAD HIVE REACTION AFTER ADMINISTERING CT CONTRAST DYE.       Contrast Dye        Physical Exam   Vital Signs: Temp: 97.5  F (36.4  C) Temp src: Oral BP: 131/89 Pulse: 101   Resp: 18 SpO2: 100 % O2 Device: None (Room air)    Weight: 158 lbs 8 oz    Exam  Gen: awake and alert, appears comfortable, appears stated age  HEENT: NCAT, scleral icterus present  CV: Tachycardic with regular rhythm, S1/S2, extremities warm and well perfused, trace bilateral lower extremity 1+ edema  Pulm: normal work of breathing, lungs clear to auscultation, no crackles or wheezing  GI: Nontender, abdomen is distended  Skin: warm, diffusely jaundiced  Neuro: Aox3, speech normal, moves all extremities symmetrically and equally  Psych: mood is good, affect is congruent      Data   Data reviewed today: I reviewed all medications, new labs and imaging results over the last 24 hours. I personally reviewed no images or EKG's today.    Recent Labs   Lab 05/03/21  1326 05/02/21  1241 05/01/21  0420 04/30/21  1630 04/30/21  0455 04/29/21  0815 04/29/21  0815   WBC  --  25.0* 27.8*  --  36.8*   < >  --     HGB  --  11.5* 9.3*  --  9.7*   < >  --    MCV  --  95 90  --  90   < >  --    PLT  --  320 328  --  434   < > 459*   INR  --   --  1.60*  --   --   --  1.51*   NA  --   --  140 136 129*   < >  --    POTASSIUM  --   --  3.8 3.8 4.7   < >  --    CHLORIDE  --   --  115* 110* 102   < >  --    CO2  --   --  18* 17* 17*   < >  --    BUN  --   --  44* 58* 73*   < >  --    CR  --   --  0.89 1.20* 1.69*   < >  --    ANIONGAP  --   --  7 9 10   < >  --    MARIELENA  --   --  9.0 8.8 9.3   < >  --    GLC  --   --  126* 161* 196*   < >  --    ALBUMIN 2.5*  2.4*  --  1.5*  --   --    < >  --    PROTTOTAL 6.7*  --  6.1*  --   --    < >  --    BILITOTAL 1.9*  --  2.1*  --  3.3*   < >  --    ALKPHOS 719*  --  472*  --  387*   < >  --    *  --  117*  --  128*   < >  --    *  --  181*  --  198*   < >  --     < > = values in this interval not displayed.

## 2021-05-04 NOTE — PROGRESS NOTES
Antimicrobial Stewardship Team Note    Antimicrobial Stewardship Program - A joint venture between [facility] and  Physicians to optimize antibiotic management.  NOT a formal consult - Restricted Antimicrobial Review     Patient: Berta Nava  MRN: [id number]  Allergies: Diagnostic x-ray materials and Contrast dye    Brief Summary: Berta Nava is a 55-year-old female who was initially admitted to [facility] on 4/29/2021 from scheduled paracentesis with feelings of malaise and generalized fatigue over the preceding 10 to 14 days prior to admission. PMH includes UC, HTN, HLD, Sjogren s syndrome, sclerosing cholangitis with cirrhosis c/b esophageal varices and ascites on transplant list.     Interval history: On admission on 4/29 the patient was found to be hypotensive with systolic blood pressure in the 60s and was admitted to the intensive care unit with septic shock requiring pressors. She also had a leukocytosis to 43.2, acute kidney injury and elevated LFTs, procal of 5.41.  2/2 blood cultures from that day grew Strep mitis likely secondary to spontaneous bacterial peritonitis shown by paracentesis on 4/29, however no organisms seen on gram stain and negative culture. Empiric Zosyn was started at this time. Ucx on 4/29 negative.  While at [facility] the patient was seen in consultation by infectious disease and gastroenterology. She was weaned off of pressors on 5/1 and diuretic therapy was restarted given normalization of blood pressure and renal function and notable edema. Blood culture from 5/1 NGTD x3 days. Transferred to [facility] on 5/3, repeat blood cultures were performed which are NGTD x 10 hours. Paracentesis shows resolved SBP with 259 WBC, 31% neutrophils. WBC count continues to downtrend today to 17.6 form 25.0 on 5/2. ANC today of 14.3. Remains afebrile. MELD labs continue to trend downward.     Of note, LE ultrasound shows from 5/4 shows DVT in the left  popliteal vein resulting in partial occlusion with patent flow in the distal vasculature.          Active Anti-infective Medications   (From admission, onward)                 Start     Stop    05/03/21 1930  piperacillin-tazobactam  3.375 g,   Intravenous,   EVERY 6 HOURS     Bacteremia        --                  Assessment: Strep mitis bacteremia secondary to SBP  Patient is currently on day 6 of Zosyn for SBP complicated by S. mitis bacteremia. Patient has remained afebrile and hemodynamically stable. Repeat blood cultures remain negative. Recommend narrowing Zosyn to pathogen and indication directed therapy with ceftriaxone. Consider 10-14 days of therapy given bacteremia. Consider TTE, further workup, and ID consult  if repeat blood cultures are persistently bacteremic as Step mitis can be associated with infective endocarditis.     Recommendations:  Stop Zosyn   Start ceftriaxone IV 2g daily to complete 10-14 days, starting from 4/29 when zosyn was started     Pharmacy took the following actions: Paged the provider/team     Discussed with ID Staff Dr. Luan Irving MD and Amber Walter, PharmD BCIDP     Alice AyalaD   AST Pager: [phone]     Vital Signs/Clinical Features:  Vitals         05/02 0700  -  05/03 0659 05/03 0700  -  05/04 0659 05/04 0700  -  05/04 1255   Most Recent    Temp ( F)   97.5 -  98.1      98.6     98.6 (37)    Pulse   88 -  101      70     70    Resp     18      18     18    BP   109/73 -  132/89      121/81     121/81    SpO2 (%)   97 -  100      99     99            Labs  Estimated Creatinine Clearance: 83.4 mL/min (based on SCr of 0.74 mg/dL).  Recent Labs   Lab Test 03/23/21  0744 04/29/21  0942 04/30/21  0455 04/30/21  1630 05/01/21  0420 05/04/21  0739   CR 0.64 2.28* 1.69* 1.20* 0.89 0.74       Recent Labs   Lab Test 05/13/18  0935 05/13/18  0935 03/23/21  0744 03/23/21  0744 04/29/21  0942 04/30/21  0455 05/01/21  0420 05/02/21  1241 05/04/21  0739   WBC 22.2*    < > 15.2*  --  43.2* 36.8* 27.8* 25.0* 17.6*   ANEU 20.6*  --   --   --  39.1* 35.3* 25.2*  --  14.3*   ALYM 0.8  --   --   --  1.2 0.7* 1.2  --  1.8   BRIGIDO 0.6  --   --   --  1.2 0.7 1.0  --  0.7   AEOS 0.0  --   --   --  0.0 0.0 0.0  --  0.4   HGB 12.6   < > 10.7*  --  9.7* 9.7* 9.3* 11.5* 10.7*   HCT 36.3   < > 33.6*  --  29.4* 28.0* 27.5* 35.8 33.3*   MCV 84   < > 88  --  90 90 90 95 94      < > 483*   < > 427 434 328 320 203    < > = values in this interval not displayed.       Recent Labs   Lab Test 05/16/18  0650 03/23/21  0744 04/29/21  0942 04/30/21  0455 05/01/21  0420 05/03/21  1326 05/04/21  0739   BILITOTAL 2.0* 3.9* 3.2* 3.3* 2.1* 1.9* 2.6*   ALKPHOS 344* 494* 409* 387* 472* 719* 737*   ALBUMIN 1.6* 2.0* 1.5*  --  1.5* 2.5*  2.4* 2.1*   AST 92* 75* 244* 198* 181* 212* 153*   ALT 71* 51* 143* 128* 117* 150* 126*       Recent Labs   Lab Test 05/13/18  0935 04/29/21  0942 04/30/21  0455   PCAL  --   --  5.41*   LACT 1.4 1.7 1.1             Culture Results:  7-Day Micro Results       Procedure Component Value Units Date/Time    Blood culture [[id number]] Collected: 05/03/21 2125    Order Status: Completed Lab Status: Preliminary result Updated: 05/04/21 1101    Specimen: Blood      Specimen Description Blood     Special Requests Left Arm     Culture Micro No growth after 12 hours    Blood culture [[id number]] Collected: 05/03/21 2116    Order Status: Completed Lab Status: Preliminary result Updated: 05/04/21 1101    Specimen: Blood      Specimen Description Blood     Special Requests Left Hand     Culture Micro No growth after 12 hours    Cell count with differential fluid [[id number]] Collected: 05/03/21 1150    Order Status: Completed Lab Status: Final result Updated: 05/03/21 1439    Specimen: Peritoneal Fluid from Abdominal Fluid      Body Fluid Analysis Source Peritoneal fluid     % Neutrophils Fluid 31 %      % Lymphocytes Fluid 32 %      % Mono/Macro Fluid 36 %      % Other Cells Fluid 1 %      Color  Fluid Yellow     Appearance Fluid Slightly Cloudy     WBC Fluid 259 /uL      Comment: No reference ranges have been established.  This result should be interpreted   in the context of the patient's clinical condition and compared to   simultaneous measurement in the patient's blood.  Refer to Lab Guide for   specific interpretive guidelines.         Albumin fluid [[id number]] Collected: 05/03/21 1150    Order Status: Completed Lab Status: Final result Updated: 05/03/21 1242    Specimen: Peritoneal Fluid from Abdominal Fluid      Albumin Fluid Source Peritoneal fluid     Albumin Fluid 0.3 g/dL      Comment: No reference ranges have been established.  This result should be interpreted   in the context of the patient's clinical condition and compared to   simultaneous measurement in the patient's blood.  Refer to Lab Guide for   specific interpretive guidelines.         Protein fluid [[id number]] Collected: 05/03/21 1150    Order Status: Completed Lab Status: Final result Updated: 05/03/21 1247    Specimen: Peritoneal Fluid from Abdominal Fluid      Protein Total Fluid Source Peritoneal fluid     Protein Total Fluid 0.9 g/dL      Comment: No reference ranges have been established.  This result should be interpreted   in the context of the patient's clinical condition and compared to   simultaneous measurement in the patient's blood.  Refer to Lab Guide for   specific interpretive guidelines.         Gram stain [[id number]] Collected: 05/03/21 1150    Order Status: Completed Lab Status: Final result Updated: 05/03/21 1717    Specimen: Peritoneal Fluid      Specimen Description Peritoneal fluid     Gram Stain No organisms seen      Moderate  WBC'S seen  predominantly mononuclear cells        Moderate  Red blood cells seen        Quantification of host cells and microbiological organisms was done on a cytocentrifuged   preparation.      Fluid Culture Aerobic Bacterial [[id number]] Collected: 05/03/21 1150    Order Status: Completed Lab  Status: Preliminary result Updated: 05/04/21 1109    Specimen: Peritoneal Fluid from Abdominal Fluid      Specimen Description Peritoneal fluid     Culture Micro Culture negative monitoring continues    Blood culture [[id number]] Collected: 05/01/21 0855    Order Status: Completed Lab Status: Preliminary result Updated: 05/04/21 0317    Specimen: Blood from Artery      Specimen Description Blood Left Arterial line     Culture Micro No growth after 3 days    Urine Culture [] Collected: 04/29/21 1106    Order Status: Completed Lab Status: Final result Updated: 04/30/21 1757    Specimen: Catheterized Urine      Specimen Description Catheterized Urine     Special Requests Specimen received in preservative     Culture Micro No growth    Blood culture []  (Abnormal) Collected: 04/29/21 1106    Order Status: Completed Lab Status: Final result Updated: 05/03/21 1120    Specimen: Blood      Specimen Description Blood Right Arm     Culture Micro Cultured on the 1st day of incubation:  Streptococcus mitis        Critical Value/Significant Value, preliminary result only, called to and read back by  Sue Gutierrez RN on 4.30.2021 at 0942. KVO        Susceptibility testing done on previous specimen    Cell count with differential fluid [] Collected: 04/29/21 1027    Order Status: Completed Lab Status: Final result Updated: 04/29/21 1201    Specimen: Ascites Fluid      Body Fluid Analysis Source ASCITES     % Neutrophils Fluid 68 %      % Lymphocytes Fluid 4 %      % Mono/Macro Fluid 22 %      Comment: MOSTLY MACROPHAGES, SOME MONOCYTES        % Other Cells Fluid 6 %      Comment: LINING CELLS        Color Fluid Yellow     Appearance Fluid Slightly Cloudy     WBC Fluid 1610 /uL      Comment: No reference ranges have been established.  This result should be interpreted   in the context of the patient's clinical condition and compared to   simultaneous measurement in the patient's blood.  Refer to Lab Guide for   specific  interpretive guidelines.         Albumin fluid [] Collected: 04/29/21 1027    Order Status: Completed Lab Status: Final result Updated: 04/29/21 1127    Specimen: Ascites Fluid      Albumin Fluid Source ASCITES     Albumin Fluid 0.3 g/dL      Comment: No reference ranges have been established.  This result should be interpreted   in the context of the patient's clinical condition and compared to   simultaneous measurement in the patient's blood.  Refer to Lab Guide for   specific interpretive guidelines.         Protein fluid [] Collected: 04/29/21 1027    Order Status: Completed Lab Status: Final result Updated: 04/29/21 1132    Specimen: Ascites Fluid      Protein Total Fluid Source ASCITES     Protein Total Fluid 1.0 g/dL      Comment: No reference ranges have been established.  This result should be interpreted   in the context of the patient's clinical condition and compared to   simultaneous measurement in the patient's blood.  Refer to Lab Guide for   specific interpretive guidelines.         Fluid Culture Aerobic Bacterial [] Collected: 04/29/21 1027    Order Status: Completed Lab Status: Final result Updated: 05/04/21 0715    Specimen: Ascites      Specimen Description Ascites     Culture Micro No growth    Gram stain [] Collected: 04/29/21 1027    Order Status: Completed Lab Status: Final result Updated: 04/29/21 1658    Specimen: Ascites      Specimen Description Ascites     Gram Stain No organisms seen      Moderate  WBC'S seen  predominantly PMN's        Preliminary Gram stain report called to and read back by  KATHIE CHILDERS RN IN [facility] ER AT 1207 EJV        Gram stain slide reviewed at the [facility]  Gram stain review consistent with reported results.        Quantification of host cells and microbiological organisms was done on a cytocentrifuged   preparation.      Blood culture []  (Abnormal)  (Susceptibility) Collected: 04/29/21 0923     Order Status: Completed Lab Status: Final result Updated: 05/03/21 1119    Specimen: Blood      Specimen Description Blood Right Arm     Culture Micro Cultured on the 1st day of incubation:  Streptococcus mitis        Critical Value/Significant Value, preliminary result only, called to and read back by  Shahbaz Hill RN @ 0410 4/30/21 TM.        (Note)  POSITIVE for STREPTOCOCCUS SPECIES OTHER THAN pneumococcus, anginosus  group, S. pyogenes and S. agalactiae. Performed using Mozido nucleic acid test. Final identification and antimicrobial  susceptibility testing will be verified by standard methods.    Specimen tested with myMedScoreigene multiplex, gram-positive blood culture  nucleic acid test for the following targets: Staph aureus, Staph  epidermidis, Staph lugdunensis, other Staph species, Enterococcus  faecalis, Enterococcus faecium, Streptococcus species, S. agalactiae,  S. anginosus grp., S. pneumoniae, S. pyogenes, Listeria sp., mecA  (methicillin resistance) and Fanny/B (vancomycin resistance).    Critical Value/Significant Value called to and read back by  Yousuf Traylor RN 4/30/21 @ 0649 TF      Susceptibility       Streptococcus mitis (1)       Antibiotic Interpretation Sensitivity Method Status    AMPICILLIN Sensitive 0.12 ug/mL DASIA Final    PENICILLIN Sensitive 0.06 ug/mL DASIA Final    VANCOMYCIN Sensitive 0.5 ug/mL DASIA Final    CEFOTAXIME Sensitive <=0.25 ug/mL DASIA Final    CEFTRIAXONE Sensitive <=0.25 ug/mL DASIA Final    ERYTHROMYCIN [*]  Resistant >0.5 ug/mL DASIA Final    CLINDAMYCIN Sensitive <=0.06 ug/mL DASIA Final    MEROPENEM [*]  Sensitive <=0.06 ug/mL DASIA Final               [*]   Suppressed Antibiotic                           Recent Labs   Lab Test 04/29/21  1106   URINEPH 5.5   NITRITE Negative   LEUKEST Negative   WBCU 15*                   Recent Labs   Lab Test 05/16/18  1700   CDBPCT Negative       Imaging: Us Abdomen Limited    Result Date: 5/4/2021  EXAMINATION: Limited Abdominal  Ultrasound, 5/4/2021 8:49 AM COMPARISON: CT abdomen and pelvis without contrast 4/29/2021. Ultrasound abdomen 4/29/2021 and 3/23/2021. MRI abdomen 4/22/2021. HISTORY: Worsening liver function tests in patient with decompensated cirrhosis, history of cholangitis and biliary stent. FINDINGS: Fluid: Small volume of ascites. Liver: The liver demonstrates markedly coarsened heterogeneous echotexture, measuring 16.8 cm in craniocaudal dimension. Distorted anatomy secondary to underlying chronic liver disease. Perihepatic ascites present. There is a septated cystic-appearing area in the posterior right hepatic lobe measuring 5.2 x 3.7 x 3.9 cm. No internal vascularity. On CT, there is a hypoattenuating 6.8 x 5 cm collection in this region. Additional markedly dilated ducts and possible collections in the caudate and central right hepatic lobe not well visualized sonographically. On MRI there are dilated ducts in this region with a dilated portion measuring up to 2.7 cm. The biliary tree is not well evaluated sonographically in the liver due to marked heterogeneity. Main portal vein is patent with antegrade flow. Gallbladder: Gallbladder is slightly distended. Layering sludge and stones better visualized on CT and MRI. There is no positive sonographic Javier's sign. Gallbladder wall is mildly thickened at 4 mm with some mild edema likely reactive given liver disease and ascites. Bile Ducts: Both the intra- and extrahepatic biliary system are of normal caliber.  The common bile duct measures 4 mm in diameter. Biliary stent not well-visualized sonographically. Pancreas: Limited visualization. Nodule adjacent to the pancreatic head measuring 2.7 x 1.3 x 2.5 cm likely a lymph node when correlated to prior CT and MRI. These have been previously biopsied as sarcoid when correlated to prior MRI. Kidney: The right kidney measures 12.4 cm long. There is no hydronephrosis or hydroureter, no shadowing renal calculi, cystic lesion or  mass.     IMPRESSION: 1.  Changes from hepatic cirrhosis with marked heterogeneity and distorted anatomy. 2.  Complex septated cystic collection in the right hepatic lobe demonstrated measuring up to 5.2 cm. This appears to have increased in size from MRI where there was a severely dilated duct in this region measuring up to 2.7 cm and correlates with a hypoattenuating area on CT. Given cholangitis history, this is suspicious for an intrahepatic abscess. Additional collections have also increased in size/are new from MRI (where there were severely dilated ducts and debris) and are noted on CT. Limited assessment of these areas sonographically. Consider close follow-up MRI/MRCP as clinically warranted. 3.  Enlarged lymph node adjacent to the pancreas. This previously biopsied as sarcoid. 4.  Common bile duct not significantly distended. Biliary stent not well shown sonographically. 5.  Ascites redemonstrated, decreased in volume given paracentesis 5/3/2021. VIDHYA TREADWELL MD    Us Abdomen Limited    Result Date: 4/29/2021  US ABDOMEN LIMITED   4/29/2021 9:31 AM HISTORY:  55-year-old woman with recurrent ascites, request made for paracentesis. TECHNIQUE: Patient was brought to the ultrasound department and informed consent obtained. Prior to beginning procedure, blood pressure was known to be low, initially  approximately 70/35. This subsequently was 77/45, though final measurement was 60/20. Patient is coherent, though lethargic. Given this finding, plan is to bring the patient to the ER for evaluation.     IMPRESSION: Patient is hypotensive upon arrival. Lethargic. To be taken to the ER for evaluation. NICK WU MD    Us Paracentesis    Result Date: 5/3/2021  US PARACENTESIS 5/3/2021 12:13 PM HISTORY: HIGH VOLUME paracentesis with or without diagnostic fluid analysis with labs to be drawn if ordered. Total paracentesis volume as much as possible. FINDINGS: Limited preprocedure ultrasound was performed,  images show a sufficient amount of ascites for paracentesis. An image was archived. Written and oral informed consent was obtained. A pause for the cause procedure to verify the correct patient and correct procedure. The skin overlying the right lower quadrant was prepped and draped in the usual sterile fashion. The subcutaneous tissues were anesthetized with 1% line. Under direct ultrasound guidance the catheter was advanced into the peritoneal space and 5.5 L of  straw colored fluid was drained. The catheter was removed and a sterile dressing was applied. There were no immediate complications. Ultrasound images were permanently stored.  Patient left the ultrasound suite in satisfactory condition.     IMPRESSION: Technically successful paracentesis without immediate complications. ANABEL NI, DO    Us Paracentesis    Result Date: 4/29/2021  US PARACENTESIS  4/29/2021 10:51 AM   HISTORY: High volume paracentesis with or without diagnostic fluid analysis with labs to be drawn if ordered. Total paracentesis volume as much as possible. PROCEDURE:  Written informed consent was obtained from the patient prior to the procedure. The risks and benefits including bleeding, infection and abdominal organ injury were discussed and the patient wished to continue. Initial ultrasound images demonstrated a large right lower quadrant fluid collection. A permanent image was saved. The skin overlying this collection was marked, prepped, draped and anesthetized in usual sterile fashion utilizing 10 mL of lidocaine 1%. The paracentesis catheter was then placed into the peritoneal fluid collection with return of 3500 mL of yellow fluid. Estimated blood loss during the procedure was less than 5 mL. No specimens collected. Patient tolerated the procedure well. The patient will receive intravenous albumin on the usual sliding scale as needed per protocol.  With continuous ultrasound guidance, 8 French needle and catheter advanced  into the ascites and ultrasound image was stored for documentation.     IMPRESSION:  Ultrasound-guided paracentesis.  3.5 L removed. Sample sent to the lab for evaluation.  NICK WU MD    Xr Chest Port 1 View    Result Date: 4/29/2021  CHEST ONE VIEW April 29, 2021 10:00 AM HISTORY: Fever. COMPARISON: None.     IMPRESSION: No acute disease. KARIN GALLARDO MD    Ct Abdomen Pelvis W/o Contrast    Result Date: 4/29/2021  CT ABDOMEN PELVIS WITHOUT CONTRAST 4/29/2021 3:29 PM CLINICAL HISTORY: Hypotension, septic shock. TECHNIQUE: CT scan of the abdomen and pelvis was performed without IV contrast. Multiplanar reformats were obtained. Dose reduction techniques were used. CONTRAST: None. COMPARISON: MRI from February 4, 2021 FINDINGS: LOWER CHEST: Minimal bibasilar atelectasis vs. less likely infiltrate right worse than left. Trace pleural fluid. HEPATOBILIARY: Cirrhotic-appearing liver morphology. Distended gallbladder similar to previous. No calcified gallstones. Biliary stent noted. PANCREAS: Grossly unremarkable. SPLEEN: Splenomegaly at 14.5 cm. No focal lesions. ADRENAL GLANDS: Somewhat thickened bilaterally. No discrete nodules. KIDNEYS/BLADDER: No significant mass, stones, or hydronephrosis. BOWEL: No obstruction or inflammatory change. LYMPH NODES: No gross lymphadenopathy in the absence of contrast. VASCULATURE: No abdominal aortic aneurysm. PELVIC ORGANS: No pelvic masses. OTHER: Small to moderate amount of ascites. No free air. MUSCULOSKELETAL: No suspicious bony lesions.     IMPRESSION: 1.  Small to moderate amount of ascites. 2.  Cirrhotic liver morphology and distended gallbladder similar to previous. Biliary stent appears to extend from the left lobe to the duodenum. 3.  No hydronephrosis or bowel obstruction demonstrated. KARIN GALLARDO MD

## 2021-05-04 NOTE — PLAN OF CARE
/73 (BP Location: Left arm)   Pulse 88   Temp 98.1  F (36.7  C) (Oral)   Resp 18   Wt 71.9 kg (158 lb 8 oz)   SpO2 97%   BMI 26.38 kg/m   AVSS on RA.  Patient denies pain. Patient denies nausea. Urine Output - voided x 1, not measured.  Bowel Function - No BM for 2 days. Nutrition - NPO. Activity - Independent. Slept well. Went for an abdominal US this morning.

## 2021-05-04 NOTE — PROGRESS NOTES
[facility]    Medicine Progress Note - Hospitalist Service, Gold 11    Pt is a 55 year old with hx of PSC with cirrhosis, biliary stents in place, ascites, UC on mesalamine, pancreatic mucinous cystadenoma, undergoing liver transplant evaluation who presented to [facility] with septic shock due to S. Mitis bacteremia and SBP. Stable on Zosyn, off pressors since 5/1, BCx NGTD since 5/1. Now admitted to [facility] 5/03 for further care. Transitioned to ceftriaxone for strep mitis bacteremia with plan stent exchange and ERCP tomorrow.       Date of Admission:  5/3/2021  Assessment & Plan      Acute decompensated cirrhosis likely secondary to SBP  Primary sclerosing cholangitis with chronic cirrhosis  Recurrent ascites  History of esophageal varices  Worsening LFTs  Admitted to [facility] 4/29 with septic shock from strep mitis bacteremia and SBP which is the most likely cause for decompensated cirrhosis.versus infection from biliary stents. Paracentesis 4/29 with 1610 WBCs, and 60% neutrophils.  - Hepatology consulted for further transplant workup  - Nec/panc team consulted for ERCP with stent exchange  -Has been maintained on IV Zosyn now on ceftriaxome  -Diuretic therapy reinitiated 5/3 after normalization of renal function; Lasix 20 mg daily, spironolactone 50 mg daily.  -Daily MELD labs.  -Patient is currently listed for transplant per GI notes at [facility].  -Bilirubin is improved to 1.9 from 2.1 however alk phos has nearly doubled to 719 from 472 and ALT AST are mildly worse. Plan for ERCP with stent exchange     Spontaneous bacterial peritonitis  Septic shock, resolved  Strep mitis bacteremia  -Required Levophed for short duration at [facility].  -Continue ceftriaxone for 10-14 day coirse  - TTE to look for endocarditis   -Follow cultures.     Possible hematochezia  Patient notes some blood in the toilet 2 days prior to transfer but no stool output in the last  36 hours.  -She endorses feeling somewhat constipated.  -As needed senna and Dulcolax.     GONZALO, resolved  -Likely related to septic shock.     Ulcerative colitis  -Continue PTA mesalamine.     Severe malnutrition, suspected  -Nutrition consult.     Hypertension  -Hold PTA lisinopril restart nadolol given varices     Hyperlipidemia  -Hold PTA statin.       Diet: 2 Gram Sodium Diet  Snacks/Supplements Adult: Other; Ensure Enlive Shake (chocolate) at HS snack time; Between Meals  NPO per Anesthesia Guidelines for Procedure/Surgery Except for: Meds    DVT Prophylaxis: Pneumatic Compression Devices  Jurado Catheter: not present  Code Status: Full Code           Disposition Plan   Expected discharge: 4 - 7 days, recommended to prior living arrangement once antibiotic plan established and safe disposition plan/ TCU bed available.  Entered: Ruben Hay DO 05/04/2021, 6:01 PM       The patient's care was discussed with the Bedside Nurse, Patient and hepatology, panc/bili Consultant.    Ruben Hay DO  Hospitalist Service, [address]  Contact information available via [facility] Paging/Directory  Please see sign in/sign out for up to date coverage information  ______________________________________________________________________    Interval History     Admitted last night from neighboring hospital. Doing quite well with no new abdominal pain, jaundice, trouble urination, SOB, chest pain, LE edema. Feels pretty much back to baseline.    Four point ROS completed and otherwise negative    Data reviewed today: I reviewed all medications, new labs and imaging results over the last 24 hours. I personally reviewed no images or EKG's today.    Physical Exam   Vital Signs: Temp: 98.6  F (37  C) Temp src: Oral BP: 121/81 Pulse: 70   Resp: 18 SpO2: 99 % O2 Device: None (Room air)    Weight: 158 lbs 8 oz    Gen: awake and alert, appears comfortable, appears stated age  HEENT: NCAT,  scleral icterus present  CV: regular rate with regular rhythm, S1/S2, extremities warm and well perfused, trace bilateral lower extremity 1+ edema  Pulm: normal work of breathing, lungs clear to auscultation, no crackles or wheezing  GI: Nontender, abdomen is distended  Skin: warm, diffusely jaundiced  Neuro: Aox3, speech normal, moves all extremities symmetrically and equally  Psych: mood is good, affect is congruent    Data   Recent Labs   Lab 05/04/21  0739 05/03/21  1326 05/02/21  1241 05/01/21  0420 04/30/21  1630 04/29/21  0815 04/29/21  0815   WBC 17.6*  --  25.0* 27.8*  --    < >  --    HGB 10.7*  --  11.5* 9.3*  --    < >  --    MCV 94  --  95 90  --    < >  --      --  320 328  --    < > 459*   INR 1.54*  --   --  1.60*  --   --  1.51*     --   --  140 136   < >  --    POTASSIUM 3.4  --   --  3.8 3.8   < >  --    CHLORIDE 112*  --   --  115* 110*   < >  --    CO2 18*  --   --  18* 17*   < >  --    BUN 22  --   --  44* 58*   < >  --    CR 0.74  --   --  0.89 1.20*   < >  --    ANIONGAP 9  --   --  7 9   < >  --    MARIELENA 9.2  --   --  9.0 8.8   < >  --    GLC 82  --   --  126* 161*   < >  --    ALBUMIN 2.1* 2.5*  2.4*  --  1.5*  --    < >  --    PROTTOTAL 6.2* 6.7*  --  6.1*  --    < >  --    BILITOTAL 2.6* 1.9*  --  2.1*  --    < >  --    ALKPHOS 737* 719*  --  472*  --    < >  --    * 150*  --  117*  --    < >  --    * 212*  --  181*  --    < >  --     < > = values in this interval not displayed.     Recent Results (from the past 24 hour(s))   US Abdomen Limited    Narrative    EXAMINATION: Limited Abdominal Ultrasound, 5/4/2021 8:49 AM     COMPARISON: CT abdomen and pelvis without contrast 4/29/2021.  Ultrasound abdomen 4/29/2021 and 3/23/2021. MRI abdomen 4/22/2021.    HISTORY: Worsening liver function tests in patient with decompensated  cirrhosis, history of cholangitis and biliary stent.    FINDINGS:   Fluid: Small volume of ascites.    Liver: The liver demonstrates markedly  coarsened heterogeneous  echotexture, measuring 16.8 cm in craniocaudal dimension. Distorted  anatomy secondary to underlying chronic liver disease. Perihepatic  ascites present. There is a septated cystic-appearing area in the  posterior right hepatic lobe measuring 5.2 x 3.7 x 3.9 cm. No internal  vascularity. On CT, there is a hypoattenuating 6.8 x 5 cm collection  in this region. Additional markedly dilated ducts and possible  collections in the caudate and central right hepatic lobe not well  visualized sonographically. On MRI there are dilated ducts in this  region with a dilated portion measuring up to 2.7 cm. The biliary tree  is not well evaluated sonographically in the liver due to marked  heterogeneity. Main portal vein is patent with antegrade flow.    Gallbladder: Gallbladder is slightly distended. Layering sludge and  stones better visualized on CT and MRI. There is no positive  sonographic Javier's sign. Gallbladder wall is mildly thickened at 4  mm with some mild edema likely reactive given liver disease and  ascites.    Bile Ducts: Both the intra- and extrahepatic biliary system are of  normal caliber.  The common bile duct measures 4 mm in diameter.  Biliary stent not well-visualized sonographically.    Pancreas: Limited visualization. Nodule adjacent to the pancreatic  head measuring 2.7 x 1.3 x 2.5 cm likely a lymph node when correlated  to prior CT and MRI. These have been previously biopsied as sarcoid  when correlated to prior MRI.    Kidney: The right kidney measures 12.4 cm long. There is no  hydronephrosis or hydroureter, no shadowing renal calculi, cystic  lesion or mass.       Impression    IMPRESSION:   1.  Changes from hepatic cirrhosis with marked heterogeneity and  distorted anatomy.  2.  Complex septated cystic collection in the right hepatic lobe  demonstrated measuring up to 5.2 cm. This appears to have increased in  size from MRI where there was a severely dilated duct in this  region  measuring up to 2.7 cm and correlates with a hypoattenuating area on  CT. Given cholangitis history, this is suspicious for an intrahepatic  abscess. Additional collections have also increased in size/are new  from MRI (where there were severely dilated ducts and debris) and are  noted on CT. Limited assessment of these areas sonographically.  Consider close follow-up MRI/MRCP as clinically warranted.  3.  Enlarged lymph node adjacent to the pancreas. This previously  biopsied as sarcoid.  4.  Common bile duct not significantly distended. Biliary stent not  well shown sonographically.  5.  Ascites redemonstrated, decreased in volume given paracentesis  5/3/2021.    VIDHYA TREADWELL MD   Echo Complete    Narrative    [id number]  [id number]  [id number]  [id number]^TAI^EULOGIO     [facility]  Echocardiography Laboratory  [address] [place] [id number]     Name: ESTUARDO JOYCE  MRN: [id number]  : 1965  Study Date: 2021 01:08 PM  Age: 55 yrs  Gender: Female  Patient Location: [facility]  Reason For Study: Endocarditis  Ordering Physician: EULOGIO LUJAN  Referring Physician: SHERRON FAIRBANKS  Performed By: LANCE Harding     BSA: 1.8 m2  Height: 65 in  Weight: 158 lb  BP: 128/91 mmHg  ______________________________________________________________________________  Procedure  Complete Portable Echo Adult.  ______________________________________________________________________________  Interpretation Summary  Global and regional left ventricular function is normal with an EF of 60-65%.  Global right ventricular function is normal. The right ventricle is normal  size.  No significant valvular abnormalities.  IVC diameter <2.1 cm collapsing >50% with sniff suggests a normal RA pressure  of 3 mmHg.  Ascites is noted.  This study was compared with the study from 2021 (resting tomograms from  stress study). No significant changes  noted.  ______________________________________________________________________________  Left Ventricle  Global and regional left ventricular function is normal with an EF of 60-65%.  Left ventricular wall thickness is normal. Left ventricular size is normal.  Left ventricular diastolic function is indeterminate.     Right Ventricle  Global right ventricular function is normal. The right ventricle is normal  size.     Atria  Both atria appear normal.     Mitral Valve  The mitral valve is normal. Trace mitral insufficiency is present.     Aortic Valve  The aortic valve is tricuspid. On Doppler interrogation, there is no  significant stenosis or regurgitation.     Tricuspid Valve  The tricuspid valve is normal. Trace tricuspid insufficiency is present.  Pulmonary artery systolic pressure cannot be assessed.     Pulmonic Valve  The valve leaflets are not well visualized. Trace pulmonic insufficiency is  present.     Vessels  Sinuses of Valsalva 3.0 cm. Ascending aorta 3.5 cm. IVC diameter <2.1 cm  collapsing >50% with sniff suggests a normal RA pressure of 3 mmHg.     Pericardium  No pericardial effusion is present.     Miscellaneous  Ascites is noted.     Compared to Previous Study  This study was compared with the study from 2021 (resting tomograms from  stress study) . No significant changes noted.  ______________________________________________________________________________  MMode/2D Measurements & Calculations  IVSd: 0.79 cm  LVIDd: 4.5 cm  LVIDs: 2.6 cm  LVPWd: 0.79 cm  FS: 41.4 %  LV mass(C)d: 110.9 grams  LV mass(C)dI: 62.0 grams/m2  Ao root diam: 3.0 cm  asc Aorta Diam: 3.5 cm  LVOT diam: 2.3 cm  LVOT area: 4.2 cm2  LA Volume (BP): 55.6 ml     LA Volume Index (BP): 31.1 ml/m2  RWT: 0.35     Doppler Measurements & Calculations  MV E max christiano: 66.0 cm/sec  MV A max christiano: 90.0 cm/sec  MV E/A: 0.73  MV dec slope: 284.0 cm/sec2  MV dec time: 0.23 sec  PA V2 max: 103.0 cm/sec  PA max P.2 mmHg  PA acc time:  0.14 sec  E/E' av.7  Lateral E/e': 6.7  Medial E/e': 10.6     ______________________________________________________________________________  Report approved by: Ho Dunlap 2021 02:08 PM           Medications       cefTRIAXone  2 g Intravenous Q24H     furosemide  20 mg Oral Daily     [START ON 2021] mesalamine  2,400 mg Oral Daily with breakfast     multivitamin w/minerals  1 tablet Oral Daily     nadolol  60 mg Oral Daily     potassium chloride ER  10 mEq Oral At Bedtime     simvastatin  20 mg Oral At Bedtime     sodium chloride (PF)  3 mL Intracatheter Q8H     spironolactone  50 mg Oral Daily     ursodiol  300 mg Oral BID

## 2021-05-05 ENCOUNTER — APPOINTMENT (OUTPATIENT)
Dept: OCCUPATIONAL THERAPY | Facility: CLINIC | Age: 56
DRG: 444 | End: 2021-05-05
Attending: INTERNAL MEDICINE
Payer: COMMERCIAL

## 2021-05-05 ENCOUNTER — APPOINTMENT (OUTPATIENT)
Dept: CT IMAGING | Facility: CLINIC | Age: 56
DRG: 444 | End: 2021-05-05
Attending: STUDENT IN AN ORGANIZED HEALTH CARE EDUCATION/TRAINING PROGRAM
Payer: COMMERCIAL

## 2021-05-05 ENCOUNTER — ANESTHESIA (OUTPATIENT)
Dept: SURGERY | Facility: CLINIC | Age: 56
DRG: 444 | End: 2021-05-05
Payer: COMMERCIAL

## 2021-05-05 ENCOUNTER — APPOINTMENT (OUTPATIENT)
Dept: GENERAL RADIOLOGY | Facility: CLINIC | Age: 56
DRG: 444 | End: 2021-05-05
Attending: INTERNAL MEDICINE
Payer: COMMERCIAL

## 2021-05-05 LAB
ABO + RH BLD: NORMAL
ABO + RH BLD: NORMAL
AFP SERPL-MCNC: 2.2 UG/L (ref 0–8)
ALBUMIN SERPL-MCNC: 1.9 G/DL (ref 3.4–5)
ALP SERPL-CCNC: 828 U/L (ref 40–150)
ALT SERPL W P-5'-P-CCNC: 110 U/L (ref 0–50)
ANION GAP SERPL CALCULATED.3IONS-SCNC: 10 MMOL/L (ref 3–14)
AST SERPL W P-5'-P-CCNC: 122 U/L (ref 0–45)
BASOPHILS # BLD AUTO: 0.1 10E9/L (ref 0–0.2)
BASOPHILS NFR BLD AUTO: 0.3 %
BILIRUB SERPL-MCNC: 2 MG/DL (ref 0.2–1.3)
BUN SERPL-MCNC: 19 MG/DL (ref 7–30)
CALCIUM SERPL-MCNC: 8.8 MG/DL (ref 8.5–10.1)
CHLORIDE SERPL-SCNC: 111 MMOL/L (ref 94–109)
CO2 SERPL-SCNC: 17 MMOL/L (ref 20–32)
CREAT SERPL-MCNC: 0.53 MG/DL (ref 0.52–1.04)
DIFFERENTIAL METHOD BLD: ABNORMAL
EOSINOPHIL # BLD AUTO: 0.6 10E9/L (ref 0–0.7)
EOSINOPHIL NFR BLD AUTO: 2.9 %
ERCP: NORMAL
ERYTHROCYTE [DISTWIDTH] IN BLOOD BY AUTOMATED COUNT: 16.3 % (ref 10–15)
GFR SERPL CREATININE-BSD FRML MDRD: >90 ML/MIN/{1.73_M2}
GLUCOSE BLDC GLUCOMTR-MCNC: 75 MG/DL (ref 70–99)
GLUCOSE SERPL-MCNC: 100 MG/DL (ref 70–99)
HCT VFR BLD AUTO: 31.3 % (ref 35–47)
HGB BLD-MCNC: 10.2 G/DL (ref 11.7–15.7)
IMM GRANULOCYTES # BLD: 0.5 10E9/L (ref 0–0.4)
IMM GRANULOCYTES NFR BLD: 2.3 %
INR PPP: 1.5 (ref 0.86–1.14)
LYMPHOCYTES # BLD AUTO: 1.6 10E9/L (ref 0.8–5.3)
LYMPHOCYTES NFR BLD AUTO: 8 %
MCH RBC QN AUTO: 30.2 PG (ref 26.5–33)
MCHC RBC AUTO-ENTMCNC: 32.6 G/DL (ref 31.5–36.5)
MCV RBC AUTO: 93 FL (ref 78–100)
MONOCYTES # BLD AUTO: 0.9 10E9/L (ref 0–1.3)
MONOCYTES NFR BLD AUTO: 4.5 %
NEUTROPHILS # BLD AUTO: 16.8 10E9/L (ref 1.6–8.3)
NEUTROPHILS NFR BLD AUTO: 82 %
NRBC # BLD AUTO: 0 10*3/UL
NRBC BLD AUTO-RTO: 0 /100
PLATELET # BLD AUTO: 200 10E9/L (ref 150–450)
POTASSIUM SERPL-SCNC: 3.6 MMOL/L (ref 3.4–5.3)
PROT SERPL-MCNC: 6 G/DL (ref 6.8–8.8)
RBC # BLD AUTO: 3.38 10E12/L (ref 3.8–5.2)
SODIUM SERPL-SCNC: 138 MMOL/L (ref 133–144)
SPECIMEN EXP DATE BLD: NORMAL
T PALLIDUM AB SER QL: NONREACTIVE
WBC # BLD AUTO: 20.4 10E9/L (ref 4–11)

## 2021-05-05 PROCEDURE — 250N000011 HC RX IP 250 OP 636: Performed by: NURSE ANESTHETIST, CERTIFIED REGISTERED

## 2021-05-05 PROCEDURE — 86900 BLOOD TYPING SEROLOGIC ABO: CPT | Performed by: NURSE PRACTITIONER

## 2021-05-05 PROCEDURE — 120N000011 HC R&B TRANSPLANT UMMC

## 2021-05-05 PROCEDURE — C1769 GUIDE WIRE: HCPCS | Performed by: INTERNAL MEDICINE

## 2021-05-05 PROCEDURE — 255N000002 HC RX 255 OP 636: Performed by: INTERNAL MEDICINE

## 2021-05-05 PROCEDURE — 70486 CT MAXILLOFACIAL W/O DYE: CPT | Mod: 26 | Performed by: RADIOLOGY

## 2021-05-05 PROCEDURE — 250N000011 HC RX IP 250 OP 636: Performed by: PHYSICIAN ASSISTANT

## 2021-05-05 PROCEDURE — 250N000011 HC RX IP 250 OP 636: Performed by: INTERNAL MEDICINE

## 2021-05-05 PROCEDURE — 97530 THERAPEUTIC ACTIVITIES: CPT | Mod: GO | Performed by: OCCUPATIONAL THERAPIST

## 2021-05-05 PROCEDURE — 97110 THERAPEUTIC EXERCISES: CPT | Mod: GO | Performed by: OCCUPATIONAL THERAPIST

## 2021-05-05 PROCEDURE — 250N000013 HC RX MED GY IP 250 OP 250 PS 637: Performed by: INTERNAL MEDICINE

## 2021-05-05 PROCEDURE — 258N000003 HC RX IP 258 OP 636: Performed by: NURSE ANESTHETIST, CERTIFIED REGISTERED

## 2021-05-05 PROCEDURE — 85025 COMPLETE CBC W/AUTO DIFF WBC: CPT | Performed by: STUDENT IN AN ORGANIZED HEALTH CARE EDUCATION/TRAINING PROGRAM

## 2021-05-05 PROCEDURE — 370N000017 HC ANESTHESIA TECHNICAL FEE, PER MIN: Performed by: INTERNAL MEDICINE

## 2021-05-05 PROCEDURE — 70486 CT MAXILLOFACIAL W/O DYE: CPT

## 2021-05-05 PROCEDURE — 0F768DZ DILATION OF LEFT HEPATIC DUCT WITH INTRALUMINAL DEVICE, VIA NATURAL OR ARTIFICIAL OPENING ENDOSCOPIC: ICD-10-PCS | Performed by: INTERNAL MEDICINE

## 2021-05-05 PROCEDURE — P9047 ALBUMIN (HUMAN), 25%, 50ML: HCPCS | Performed by: PHYSICIAN ASSISTANT

## 2021-05-05 PROCEDURE — C1876 STENT, NON-COA/NON-COV W/DEL: HCPCS | Performed by: INTERNAL MEDICINE

## 2021-05-05 PROCEDURE — 86301 IMMUNOASSAY TUMOR CA 19-9: CPT | Performed by: STUDENT IN AN ORGANIZED HEALTH CARE EDUCATION/TRAINING PROGRAM

## 2021-05-05 PROCEDURE — 360N000082 HC SURGERY LEVEL 2 W/ FLUORO, PER MIN: Performed by: INTERNAL MEDICINE

## 2021-05-05 PROCEDURE — 250N000013 HC RX MED GY IP 250 OP 250 PS 637: Performed by: STUDENT IN AN ORGANIZED HEALTH CARE EDUCATION/TRAINING PROGRAM

## 2021-05-05 PROCEDURE — 250N000025 HC SEVOFLURANE, PER MIN: Performed by: INTERNAL MEDICINE

## 2021-05-05 PROCEDURE — 710N000010 HC RECOVERY PHASE 1, LEVEL 2, PER MIN: Performed by: INTERNAL MEDICINE

## 2021-05-05 PROCEDURE — 999N000179 XR SURGERY CARM FLUORO LESS THAN 5 MIN W STILLS: Mod: TC

## 2021-05-05 PROCEDURE — 97535 SELF CARE MNGMENT TRAINING: CPT | Mod: GO | Performed by: OCCUPATIONAL THERAPIST

## 2021-05-05 PROCEDURE — 250N000009 HC RX 250: Performed by: NURSE ANESTHETIST, CERTIFIED REGISTERED

## 2021-05-05 PROCEDURE — 99233 SBSQ HOSP IP/OBS HIGH 50: CPT | Performed by: NURSE PRACTITIONER

## 2021-05-05 PROCEDURE — 99233 SBSQ HOSP IP/OBS HIGH 50: CPT | Performed by: STUDENT IN AN ORGANIZED HEALTH CARE EDUCATION/TRAINING PROGRAM

## 2021-05-05 PROCEDURE — 36415 COLL VENOUS BLD VENIPUNCTURE: CPT | Performed by: STUDENT IN AN ORGANIZED HEALTH CARE EDUCATION/TRAINING PROGRAM

## 2021-05-05 PROCEDURE — 999N000141 HC STATISTIC PRE-PROCEDURE NURSING ASSESSMENT: Performed by: INTERNAL MEDICINE

## 2021-05-05 PROCEDURE — 272N000001 HC OR GENERAL SUPPLY STERILE: Performed by: INTERNAL MEDICINE

## 2021-05-05 PROCEDURE — 0FC68ZZ EXTIRPATION OF MATTER FROM LEFT HEPATIC DUCT, VIA NATURAL OR ARTIFICIAL OPENING ENDOSCOPIC: ICD-10-PCS | Performed by: INTERNAL MEDICINE

## 2021-05-05 PROCEDURE — 999N001017 HC STATISTIC GLUCOSE BY METER IP

## 2021-05-05 PROCEDURE — 82105 ALPHA-FETOPROTEIN SERUM: CPT | Performed by: STUDENT IN AN ORGANIZED HEALTH CARE EDUCATION/TRAINING PROGRAM

## 2021-05-05 PROCEDURE — C1726 CATH, BAL DIL, NON-VASCULAR: HCPCS | Performed by: INTERNAL MEDICINE

## 2021-05-05 PROCEDURE — 80053 COMPREHEN METABOLIC PANEL: CPT | Performed by: STUDENT IN AN ORGANIZED HEALTH CARE EDUCATION/TRAINING PROGRAM

## 2021-05-05 PROCEDURE — 250N000009 HC RX 250: Performed by: INTERNAL MEDICINE

## 2021-05-05 PROCEDURE — 86901 BLOOD TYPING SEROLOGIC RH(D): CPT | Performed by: NURSE PRACTITIONER

## 2021-05-05 PROCEDURE — 86780 TREPONEMA PALLIDUM: CPT | Performed by: STUDENT IN AN ORGANIZED HEALTH CARE EDUCATION/TRAINING PROGRAM

## 2021-05-05 PROCEDURE — 85610 PROTHROMBIN TIME: CPT | Performed by: STUDENT IN AN ORGANIZED HEALTH CARE EDUCATION/TRAINING PROGRAM

## 2021-05-05 DEVICE — STENT JOHLIN PANCREA WEDGE 10FRX22CM W/INTRO G26828
Type: IMPLANTABLE DEVICE | Site: BILE DUCT | Status: NON-FUNCTIONAL
Removed: 2021-05-10

## 2021-05-05 RX ORDER — DEXAMETHASONE SODIUM PHOSPHATE 4 MG/ML
INJECTION, SOLUTION INTRA-ARTICULAR; INTRALESIONAL; INTRAMUSCULAR; INTRAVENOUS; SOFT TISSUE PRN
Status: DISCONTINUED | OUTPATIENT
Start: 2021-05-05 | End: 2021-05-05

## 2021-05-05 RX ORDER — SODIUM CHLORIDE, SODIUM LACTATE, POTASSIUM CHLORIDE, CALCIUM CHLORIDE 600; 310; 30; 20 MG/100ML; MG/100ML; MG/100ML; MG/100ML
INJECTION, SOLUTION INTRAVENOUS CONTINUOUS PRN
Status: DISCONTINUED | OUTPATIENT
Start: 2021-05-05 | End: 2021-05-05

## 2021-05-05 RX ORDER — ONDANSETRON 2 MG/ML
INJECTION INTRAMUSCULAR; INTRAVENOUS PRN
Status: DISCONTINUED | OUTPATIENT
Start: 2021-05-05 | End: 2021-05-05

## 2021-05-05 RX ORDER — IOPAMIDOL 510 MG/ML
INJECTION, SOLUTION INTRAVASCULAR PRN
Status: DISCONTINUED | OUTPATIENT
Start: 2021-05-05 | End: 2021-05-05 | Stop reason: HOSPADM

## 2021-05-05 RX ORDER — NALOXONE HYDROCHLORIDE 0.4 MG/ML
0.4 INJECTION, SOLUTION INTRAMUSCULAR; INTRAVENOUS; SUBCUTANEOUS
Status: DISCONTINUED | OUTPATIENT
Start: 2021-05-05 | End: 2021-05-05 | Stop reason: HOSPADM

## 2021-05-05 RX ORDER — ALBUMIN (HUMAN) 12.5 G/50ML
12.5 SOLUTION INTRAVENOUS ONCE
Status: COMPLETED | OUTPATIENT
Start: 2021-05-05 | End: 2021-05-05

## 2021-05-05 RX ORDER — NALOXONE HYDROCHLORIDE 0.4 MG/ML
0.2 INJECTION, SOLUTION INTRAMUSCULAR; INTRAVENOUS; SUBCUTANEOUS
Status: DISCONTINUED | OUTPATIENT
Start: 2021-05-05 | End: 2021-05-05 | Stop reason: HOSPADM

## 2021-05-05 RX ORDER — FLUMAZENIL 0.1 MG/ML
0.2 INJECTION, SOLUTION INTRAVENOUS
Status: ACTIVE | OUTPATIENT
Start: 2021-05-05 | End: 2021-05-06

## 2021-05-05 RX ORDER — ONDANSETRON 4 MG/1
4 TABLET, ORALLY DISINTEGRATING ORAL EVERY 30 MIN PRN
Status: DISCONTINUED | OUTPATIENT
Start: 2021-05-05 | End: 2021-05-05 | Stop reason: HOSPADM

## 2021-05-05 RX ORDER — PROPOFOL 10 MG/ML
INJECTION, EMULSION INTRAVENOUS PRN
Status: DISCONTINUED | OUTPATIENT
Start: 2021-05-05 | End: 2021-05-05

## 2021-05-05 RX ORDER — LIDOCAINE HYDROCHLORIDE 20 MG/ML
INJECTION, SOLUTION INFILTRATION; PERINEURAL PRN
Status: DISCONTINUED | OUTPATIENT
Start: 2021-05-05 | End: 2021-05-05

## 2021-05-05 RX ORDER — MEPERIDINE HYDROCHLORIDE 25 MG/ML
12.5 INJECTION INTRAMUSCULAR; INTRAVENOUS; SUBCUTANEOUS
Status: DISCONTINUED | OUTPATIENT
Start: 2021-05-05 | End: 2021-05-05 | Stop reason: HOSPADM

## 2021-05-05 RX ORDER — FENTANYL CITRATE 50 UG/ML
25-50 INJECTION, SOLUTION INTRAMUSCULAR; INTRAVENOUS
Status: DISCONTINUED | OUTPATIENT
Start: 2021-05-05 | End: 2021-05-05 | Stop reason: HOSPADM

## 2021-05-05 RX ORDER — INDOMETHACIN 50 MG/1
SUPPOSITORY RECTAL PRN
Status: DISCONTINUED | OUTPATIENT
Start: 2021-05-05 | End: 2021-05-05 | Stop reason: HOSPADM

## 2021-05-05 RX ORDER — ESMOLOL HYDROCHLORIDE 10 MG/ML
INJECTION INTRAVENOUS PRN
Status: DISCONTINUED | OUTPATIENT
Start: 2021-05-05 | End: 2021-05-05

## 2021-05-05 RX ORDER — SODIUM CHLORIDE, SODIUM LACTATE, POTASSIUM CHLORIDE, CALCIUM CHLORIDE 600; 310; 30; 20 MG/100ML; MG/100ML; MG/100ML; MG/100ML
INJECTION, SOLUTION INTRAVENOUS CONTINUOUS
Status: DISCONTINUED | OUTPATIENT
Start: 2021-05-05 | End: 2021-05-05 | Stop reason: HOSPADM

## 2021-05-05 RX ORDER — ONDANSETRON 2 MG/ML
4 INJECTION INTRAMUSCULAR; INTRAVENOUS EVERY 30 MIN PRN
Status: DISCONTINUED | OUTPATIENT
Start: 2021-05-05 | End: 2021-05-05 | Stop reason: HOSPADM

## 2021-05-05 RX ADMIN — PHENYLEPHRINE HYDROCHLORIDE 0.2 MCG/KG/MIN: 10 INJECTION INTRAVENOUS at 14:39

## 2021-05-05 RX ADMIN — FUROSEMIDE 20 MG: 20 TABLET ORAL at 08:22

## 2021-05-05 RX ADMIN — MIDAZOLAM 2 MG: 1 INJECTION INTRAMUSCULAR; INTRAVENOUS at 13:51

## 2021-05-05 RX ADMIN — PHENYLEPHRINE HYDROCHLORIDE 50 MCG: 10 INJECTION INTRAVENOUS at 14:39

## 2021-05-05 RX ADMIN — SPIRONOLACTONE 50 MG: 50 TABLET, FILM COATED ORAL at 08:21

## 2021-05-05 RX ADMIN — ROCURONIUM BROMIDE 10 MG: 10 INJECTION INTRAVENOUS at 14:55

## 2021-05-05 RX ADMIN — URSODIOL 300 MG: 300 CAPSULE ORAL at 19:48

## 2021-05-05 RX ADMIN — BISACODYL 10 MG: 10 SUPPOSITORY RECTAL at 20:01

## 2021-05-05 RX ADMIN — ONDANSETRON 4 MG: 2 INJECTION INTRAMUSCULAR; INTRAVENOUS at 14:58

## 2021-05-05 RX ADMIN — POTASSIUM CHLORIDE 10 MEQ: 750 CAPSULE, EXTENDED RELEASE ORAL at 22:17

## 2021-05-05 RX ADMIN — PHENYLEPHRINE HYDROCHLORIDE 100 MCG: 10 INJECTION INTRAVENOUS at 14:26

## 2021-05-05 RX ADMIN — URSODIOL 300 MG: 300 CAPSULE ORAL at 08:21

## 2021-05-05 RX ADMIN — LIDOCAINE HYDROCHLORIDE 60 MG: 20 INJECTION, SOLUTION INFILTRATION; PERINEURAL at 13:59

## 2021-05-05 RX ADMIN — PHENYLEPHRINE HYDROCHLORIDE 100 MCG: 10 INJECTION INTRAVENOUS at 14:30

## 2021-05-05 RX ADMIN — PHENYLEPHRINE HYDROCHLORIDE 100 MCG: 10 INJECTION INTRAVENOUS at 14:07

## 2021-05-05 RX ADMIN — ESMOLOL HYDROCHLORIDE 50 MG: 10 INJECTION, SOLUTION INTRAVENOUS at 14:02

## 2021-05-05 RX ADMIN — PHENYLEPHRINE HYDROCHLORIDE 100 MCG: 10 INJECTION INTRAVENOUS at 14:36

## 2021-05-05 RX ADMIN — DEXAMETHASONE SODIUM PHOSPHATE 4 MG: 4 INJECTION, SOLUTION INTRA-ARTICULAR; INTRALESIONAL; INTRAMUSCULAR; INTRAVENOUS; SOFT TISSUE at 14:31

## 2021-05-05 RX ADMIN — PHENYLEPHRINE HYDROCHLORIDE 200 MCG: 10 INJECTION INTRAVENOUS at 14:17

## 2021-05-05 RX ADMIN — SIMVASTATIN 20 MG: 5 TABLET, FILM COATED ORAL at 22:17

## 2021-05-05 RX ADMIN — PROPOFOL 150 MG: 10 INJECTION, EMULSION INTRAVENOUS at 13:59

## 2021-05-05 RX ADMIN — ROCURONIUM BROMIDE 50 MG: 10 INJECTION INTRAVENOUS at 14:00

## 2021-05-05 RX ADMIN — ALBUMIN HUMAN 12.5 G: 0.25 SOLUTION INTRAVENOUS at 17:22

## 2021-05-05 RX ADMIN — SUGAMMADEX 200 MG: 100 INJECTION, SOLUTION INTRAVENOUS at 15:19

## 2021-05-05 RX ADMIN — Medication 1 TABLET: at 08:21

## 2021-05-05 RX ADMIN — CEFTRIAXONE 2 G: 2 INJECTION, POWDER, FOR SOLUTION INTRAMUSCULAR; INTRAVENOUS at 18:54

## 2021-05-05 RX ADMIN — SODIUM CHLORIDE, POTASSIUM CHLORIDE, SODIUM LACTATE AND CALCIUM CHLORIDE: 600; 310; 30; 20 INJECTION, SOLUTION INTRAVENOUS at 13:55

## 2021-05-05 RX ADMIN — NADOLOL 60 MG: 40 TABLET ORAL at 08:21

## 2021-05-05 RX ADMIN — MESALAMINE 2400 MG: 1.2 TABLET, DELAYED RELEASE ORAL at 08:21

## 2021-05-05 ASSESSMENT — ACTIVITIES OF DAILY LIVING (ADL)
ADLS_ACUITY_SCORE: 13

## 2021-05-05 ASSESSMENT — MIFFLIN-ST. JEOR: SCORE: 1288.96

## 2021-05-05 NOTE — ANESTHESIA POSTPROCEDURE EVALUATION
Patient: Berta Nava    Procedure(s):  ENDOSCOPIC RETROGRADE CHOLANGIOPANCREATOGRAPHY WITH STENT EXCHANGE, STONE EXTRACTION, AND DILATION    Diagnosis:PSC (primary sclerosing cholangitis) [K83.01]  Choledocholithiasis [K80.50]  Diagnosis Additional Information: No value filed.    Anesthesia Type:  General    Note:  Disposition: Outpatient   Postop Pain Control: Uneventful            Sign Out: Well controlled pain   PONV: No   Neuro/Psych: Uneventful            Sign Out: Acceptable/Baseline neuro status   Airway/Respiratory: Uneventful            Sign Out: Acceptable/Baseline resp. status   CV/Hemodynamics: Uneventful            Sign Out: Acceptable CV status; No obvious hypovolemia; No obvious fluid overload   Other NRE:    DID A NON-ROUTINE EVENT OCCUR?            Last vitals:  Vitals:    05/05/21 1247 05/05/21 1530 05/05/21 1545   BP: (!) 127/90 106/60 139/73   Pulse: 66 65 58   Resp:  12 11   Temp: 36.6  C (97.8  F) 35.4  C (95.7  F) 35.4  C (95.7  F)   SpO2: 100% 94% 98%       Last vitals prior to Anesthesia Care Transfer:  CRNA VITALS  5/5/2021 1458 - 5/5/2021 1556      5/5/2021             NIBP:  106/60    Pulse:  63    NIBP Mean:  82    Temp:  36.4  C (97.5  F)    SpO2:  96 %    Resp Rate (observed):  14          Electronically Signed By: Geno Hanson MD  May 5, 2021  3:56 PM

## 2021-05-05 NOTE — CONSULTS
Assessment and Plan:  1. liver transplant evaluation - patient is a fair candidate overall. Benefits and surgical risks of a liver transplantation were discussed.  2. End stage liver disease due to cirrohsis of the liver due to PSC    Surgical evaluation:  1. Portal Vein: Patent, antegrade  2. Hepatic Artery: Patent, antegrade  3. TIPS: No  4. Previous Abdominal Surgery: Bilateral salpingoophorectomy  5. Hepatocellular Carcinoma: No  6. Ascites: Yes  7. Costal Angle: Wide  8. Portopulmonary Hypertension: PA pressures not assesed  9. Hepatopulmonary Syndrome: No  10. Cardiac Evaluation: Echo completed  11. Nutritional Status: Good    MELD-Na score: 11 at 5/9/2021  6:49 AM  MELD score: 11 at 5/9/2021  6:49 AM  Calculated from:  Serum Creatinine: 0.45 mg/dL (Rounded to 1 mg/dL) at 5/9/2021  6:49 AM  Serum Sodium: 138 mmol/L (Rounded to 137 mmol/L) at 5/9/2021  6:49 AM  Total Bilirubin: 1.3 mg/dL at 5/9/2021  6:49 AM  INR(ratio): 1.41 at 5/9/2021  6:49 AM  Age: 55 years 8 months      Recommendations:   No surgical contraindications to transplant, pt seen and examined with Dr. Mackay    Patients overall evaluation will be discussed at the Liver Transplant selection committee meeting with a final recommendation on the patients suitability for transplant to be made at that time.    Consult Full Details:  Berta Nava was seen in consultation at the request of Dr. Hay for evaluation as a potential liver transplant recipient.    Reason for Visit:  Berta is a 55 year old year old male with cirrohsis of the liver due to PSC, who presents for liver transplant evaluation.     HPI:  Presenting complaint: fevers and malaise    Berta is a 56 yo woman with PSC related cirrhosis, UC on mesalamine (biopsies from December 2020 with no active disease or dysplasia), esophageal varices, ascites, and biliary strictures and choledocholithiasis s/p stenting in January 2021, HTN, pancreatic mucinous cystadenoma, Sjogren's disease, and  "possible sarcoidosis. She is admitted with fever and malaise and found to have strep mitis bacteremia now on zosyn, ascites with possible spontaneous bacterial peritonitis now receiving albumin and lasix. Planning for ERCP with stent exchange today.    Today, she reports she feels a bit more bloated and is noticing more abdominal fluid accumulation. She also has been constipated and has a sensation of stool within the rectal vault that she is not able to expel. No nausea or vomiting, but low appetite. No fevers / chills since beginning antibiotics. She is worried about how she has needed paracentesis twice since admission.     She lives at home with her . Normally able to take long walks around a pond near her home without shortness of breath. Works in IT, currently working from home. No EtOH, tobacco, or other substances.      Previous Transplant Hx: None    Cardiovascular Hx:  h/o Cardiac Issues: HTN, HLD  Exercise Tolerance: Able to complete 4 mets prior to this hospitalization    Potential Donor(s): Per chart, potential living donors    ROS:   REVIEW OF SYSTEMS (check box if normal)  [malaise]   GENERAL [X]   PULMONARY [X]   GENITOURINARY  [X]    CNS [X]   CARDIAC [X]    ENDOCRINE  [X]   EARS,NOSE,THROAT [X]    GASTROINTESTINAL [constipation]   NEUROLOGIC   [X]   MUSCLOSKELTAL [X]    HEMATOLOGY    Examination:   Vitals:  /71 (BP Location: Left arm)   Pulse 63   Temp 97.8  F (36.6  C) (Oral)   Resp 16   Ht 1.651 m (5' 5\")   Wt 70.3 kg (155 lb)   SpO2 98%   BMI 25.79 kg/m      GENERAL APPEARANCE: alert and no distress  EYES: PERRL  HENT: mouth without ulcers or lesions  NECK: supple, no adenopathy  RESP: respirations non-labored on room air  CV: regular rhythm, normal rate  ABDOMEN: slightly protuberant, soft, no caput medusae, ascites present   MS: extremities normal- no gross deformities noted, no evidence of inflammation in joints, no muscle tenderness  SKIN: no rash, jaundice  NEURO: Normal " strength and tone, sensory exam grossly normal, mentation intact and speech normal  PSYCH: mentation appears normal. and affect normal/bright      Results:   MELD-Na score: 11 at 5/9/2021  6:49 AM  MELD score: 11 at 5/9/2021  6:49 AM  Calculated from:  Serum Creatinine: 0.45 mg/dL (Rounded to 1 mg/dL) at 5/9/2021  6:49 AM  Serum Sodium: 138 mmol/L (Rounded to 137 mmol/L) at 5/9/2021  6:49 AM  Total Bilirubin: 1.3 mg/dL at 5/9/2021  6:49 AM  INR(ratio): 1.41 at 5/9/2021  6:49 AM  Age: 55 years 8 months    I had a long discussion with the patient regarding liver transplantation which included but was not limited to  the following points:  1. Liver transplant selection committee process.  2. The federal rules for cadaveric waiting list, the size and blood type matching of the organ. The availability of living-related donor transplantation.  3. The types of donors: brain death donors, non-heart beating donors, partial liver grafts: splits and living donor grafts  4. Extended criteria Donors (older age, steasosis) and the increased risk of primary non-function using the extended criteria donors  5. The CDC high risk donors, Risk of donor transmitted infections and donor transmitted malignancy  6. The liver transplant operation and the associated risks and technical complications which can include intraoperative death, post operative death, Primary non-function, bleeding requiring re-operations, arterial and biliary complications, bowel perforations, and intra abdominal abscess. Some of these complicaitons may require a second operation.  7. The postoperative course, the ICU stay and risk of postoperative complications which can include sepsis, MI, stroke, brain injury, pneumonia, pleural effusions, and renal dysfunction.  8. The current 1 year and 5 year graft and patient survivals.  9. The need for life long immunosuppressive therapy and the side effects of these medications, including the possibility of toxicity,  opportunistic infections, risk of cancer including lymphoma, and the possibility of rejection even if the patient is taking the medication exactly as prescribed.  10. The need for compliance with medications and follow-up visits in the clinic and thereafter.  11. The patient and family understand these risks and wish to proceed to transplantation    I spent 60 minutes with the patient and more than 50% of the time was spend in direct face to face counseling.    Attestation:  Patient has been seen and evaluated by me. Low MELD, but intermittent cholangitis.  Vital signs, labs, medications and orders were reviewed.   When obtained, diagnostic images were reviewed by me and interpreted as above.    The care plan was discussed with the multidisciplinary team and I agree with the findings and plan in this note, with any differences recorded in blue.    .

## 2021-05-05 NOTE — OP NOTE
ERCP 05/05/2021  1:23 PM [facility], [address] [place] [address] [phone]     Endoscopy Department   _______________________________________________________________________________   Patient Name: Berta Nava           Procedure Date: 5/5/2021 1:23 PM   MRN: [id number]                       Account Number: [id number]   YOB: 1965              Admit Type: Inpatient   Age: 55                               Room: OR   Gender: Female                        Note Status: Finalized   Attending MD: Gregory Gabriel MD       Pause for the Cause: time out performed   Total Sedation Time:                     _______________________________________________________________________________       Procedure:           ERCP   Indications:         Elevated liver enzymes, 55-year-old with a history of                        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 Plan for ERCP for potential                        other source control   Providers:           Gregory Gabriel MD   Referring MD:           Requesting Provider: Gonzalo Tapia MD, Thomas M. Leventhal, MD   Medicines:           General Anesthesia, Indomethacin 100 mg DE   Complications:       No immediate complications. Estimated blood loss:                        Minimal.   _______________________________________________________________________________   Procedure:           Pre-Anesthesia Assessment:                        - Prior to the procedure, a History and Physical was                         performed, and patient medications and allergies were                        reviewed. The patient is competent. The risks and                        benefits of the procedure and the sedation options and                        risks were discussed with the patient. All questions                        were answered and informed consent was obtained. Patient                        identification and proposed procedure were verified by                        the physician, the nurse, the anesthesiologist and the                        anesthetist in the procedure room. Mental Status                        Examination: alert and oriented. Airway Examination:                        normal oropharyngeal airway and neck mobility.                        Respiratory Examination: clear to auscultation. CV                        Examination: normal. Prophylactic Antibiotics: The                        patient requires prophylactic antibiotics for the                        planned ERCP due to active bacterial cholangitis. The                        patient received antibiotic therapy before the                         procedure. Prior Anticoagulants: The patient has taken                        no previous anticoagulant or antiplatelet agents. ASA                        Grade Assessment: III - A patient with severe systemic                        disease. After reviewing the risks and benefits, the                        patient was deemed in satisfactory condition to undergo                        the procedure. The anesthesia plan was to use general                        anesthesia. Immediately prior to administration of                        medications, the patient was re-assessed for adequacy to                        receive sedatives. The heart rate, respiratory rate,                        oxygen saturations, blood pressure, adequacy of                        pulmonary ventilation, and response to care were                         monitored throughout the procedure. The physical status                        of the patient was re-assessed after the procedure.                        After obtaining informed consent, the scope was passed                        under direct vision. Throughout the procedure, the                        patient's blood pressure, pulse, and oxygen saturations                        were monitored continuously. The was introduced through                        the mouth, and used to inject contrast into and used to                        inject contrast into the bile duct. The ERCP was                        accomplished without difficulty. The patient tolerated                        the procedure well.                                                                                     Findings:        A biliary stent was visible on the  film. The esophagus was        successfully intubated under direct vision. The scope was advanced from        the mouth to the duodenum. The pharynx, larynx and associated        structures, as well as the upper GI tract, were normal. One plastic        stent originating in the biliary tree was emerging from the major        papilla. The stent was partially occluded. A biliary sphincterotomy had        been performed. The sphincterotomy appeared open. The bile duct was        deeply cannulated with the 15 mm balloon. Contrast was injected. I        personally interpreted the bile duct images. There was brisk flow of        contrast through the ducts. Image quality was excellent. Contrast        extended to the entire biliary tree. The entire biliary tree contained        diffuse rarefaction of ducts. The entire biliary tree contained a        diffuse irregularity with areas of stricturing and dilation. The left        intrahepatic branches and left and right hepatic ducts and all        intrahepatic branches contained filling defect(s) thought to be a  stone        and sludge. Contrast appeared to fill an abscess cavity in the left        lobe. Visiglide wire was passed into the biliary tree. The biliary tree        was swept with a 12 mm balloon starting at the right intrahepatic        duct(s). Many stones were removed. Many stones remained. Dilation of the        left main hepatic duct with a 4 mm balloon dilator was successful. The        biliary tree was swept with a 15 mm balloon and basket starting at the        left intrahepatic duct(s). Many stones were removed. No stones remained.        Pus was swept from the duct. One 10 Fr by 20 cm transpapillary plastic        johlin stent was placed 19.5 cm into the left hepatic duct. Bile flowed        through the stent. The stent was in good position. The scope was passed        under direct vision through the upper GI tract. Grade III varices were        found in the lower third of the esophagus. They were large in size. Not        banded due to potential need for early repeat ERCP intervention.                                                                                     Impression:          - One partially occluded stent from the biliary tree was                        seen in the major papilla. Removed.                        - Cholangiogram showed diffusely irregular biliary tree                        with paucity/loss of multiple tertiary branches                        consistent with PSC/cirrhosis. Multiple filling defects                        throught the biliary tree consistent with stones/sludge                        particularly in the left intrahepatic. There appeared to                        be a ~2 cm collection/abscess off of the left                        intrahepatic. Attempts to gain wire access to this                        collection were unsuccessful.                        - Cystic duct/gallbladder opacified                        - Stone clearance was achieved with balloon  dilation and                        balloon/basket sweeping. Large amount of stone,                        sludge/cast-like material with associated pus removed                        - One 10 Fr x 20 cm Johlin stent placed into the left                        intrahepatic biliary tree                        - Grade III esophageal varices. Not banded at this time                        due to concern that early repeat ERCP intervention could                        be needed for recurrent cholangitis   Recommendation:      - Return patient to hospital strong for ongoing care.                        - Continue antibiotics to cover for cholangitis                        - Monitor LFTs and clinical status for improvement. If                        persistent fevers/leukocytosis/sepsis, may need to                        repeat MRCP to aid with repeat ERCP planning. Hopefully,                        can avoid percutaneous drainage of likely liver abscess.                        - Tentatively plan for repeat ERCP in 1 month with Dr. Gabriel                        - Hepatology to continue following                                                                                       Gregory Gabriel MD

## 2021-05-05 NOTE — PROGRESS NOTES
[facility]  HEPATOLOGY PROGRESS NOTE  Berta Nava [id number]       CC: fevers, lethargy  SUBJECTIVE:  Continues to feel washed out but slight improvement in prior lethargy. She denies fevers, chills, significant abdominal pain, nausea or vomiting. She has mild pruritus and denies any oral pain or dysphagia. She does have an increase in her abdominal bloating with ascites. No change in mentation.     ROS:  A 10-point review of systems was negative.    OBJECTIVE:  VS: /76 (BP Location: Left arm)   Pulse 63   Temp 98.4  F (36.9  C) (Oral)   Resp 16   Wt 71.9 kg (158 lb 8 oz)   SpO2 99%   BMI 26.38 kg/m       General: In no acute distress, mild facial muscle wasting  Neuro: AOx3, No asterixis  Lymph: No cervical lymphadenopathy  HEENT:  Noscleral icterus, Nooral lesions  CV: S1/M6ezzstmt murmurs. Skin warm and dry  Lungs: clear to auscultation, diminished bases Respirations even and nonlabored on room air  Abd:  Mildly distended, nontender    Extrem: Noperipheral edema   Skin: mild jaundice  Psych:affect and mood normal    MEDICATIONS:  Current Facility-Administered Medications   Medication     bisacodyl (DULCOLAX) Suppository 10 mg     cefTRIAXone (ROCEPHIN) 2 g vial to attach to  ml bag for ADULTS or NS 50 ml bag for PEDS     furosemide (LASIX) tablet 20 mg     lidocaine (LMX4) cream     lidocaine 1 % 0.1-1 mL     melatonin tablet 3 mg     mesalamine (LIALDA) EC tablet 2,400 mg     multivitamin w/minerals (THERA-VIT-M) tablet 1 tablet     nadolol (CORGARD) tablet 60 mg     ondansetron (ZOFRAN-ODT) ODT tab 4 mg    Or     ondansetron (ZOFRAN) injection 4 mg     potassium chloride ER (MICRO-K) CR capsule 10 mEq     senna-docusate (SENOKOT-S/PERICOLACE) 8.6-50 MG per tablet 2 tablet     simvastatin (ZOCOR) tablet 20 mg     sodium chloride (PF) 0.9% PF flush 3 mL     sodium chloride (PF) 0.9% PF flush 3 mL     spironolactone (ALDACTONE) tablet 50 mg     ursodiol (ACTIGALL) capsule 300 mg        REVIEW OF LABORATORY, PATHOLOGY AND IMAGING RESULTS:  BMP  Recent Labs   Lab 05/05/21  0605 05/04/21  0739 05/01/21  0420 04/30/21  1630    139 140 136   POTASSIUM 3.6 3.4 3.8 3.8   CHLORIDE 111* 112* 115* 110*   MARIELENA 8.8 9.2 9.0 8.8   CO2 17* 18* 18* 17*   BUN 19 22 44* 58*   CR 0.53 0.74 0.89 1.20*   * 82 126* 161*     CBC  Recent Labs   Lab 05/05/21  0605 05/04/21  0739 05/02/21  1241 05/01/21  0420   WBC 20.4* 17.6* 25.0* 27.8*   RBC 3.38* 3.55* 3.76* 3.07*   HGB 10.2* 10.7* 11.5* 9.3*   HCT 31.3* 33.3* 35.8 27.5*   MCV 93 94 95 90   MCH 30.2 30.1 30.6 30.3   MCHC 32.6 32.1 32.1 33.8   RDW 16.3* 16.2* 16.0* 15.3*    203 320 328     INR  Recent Labs   Lab 05/05/21  0605 05/04/21  0739 05/01/21  0420 04/29/21  0815   INR 1.50* 1.54* 1.60* 1.51*     LFTs  Recent Labs   Lab 05/05/21  0605 05/04/21  0739 05/03/21  1326 05/01/21  0420   ALKPHOS 828* 737* 719* 472*   * 153* 212* 181*   * 126* 150* 117*   BILITOTAL 2.0* 2.6* 1.9* 2.1*   PROTTOTAL 6.0* 6.2* 6.7* 6.1*   ALBUMIN 1.9* 2.1* 2.5*  2.4* 1.5*      PANCNo lab results found in last 7 days.   MELD-Na score: 14 at 5/5/2021  6:05 AM  MELD score: 14 at 5/5/2021  6:05 AM  Calculated from:  Serum Creatinine: 0.53 mg/dL (Rounded to 1 mg/dL) at 5/5/2021  6:05 AM  Serum Sodium: 138 mmol/L (Rounded to 137 mmol/L) at 5/5/2021  6:05 AM  Total Bilirubin: 2.0 mg/dL at 5/5/2021  6:05 AM  INR(ratio): 1.50 at 5/5/2021  6:05 AM  Age: 55 years 8 months      Imaging Results:  US abd 5/4/21  IMPRESSION:   1.  Changes from hepatic cirrhosis with marked heterogeneity and  distorted anatomy.  2.  Complex septated cystic collection in the right hepatic lobe  demonstrated measuring up to 5.2 cm. This appears to have increased in  size from MRI where there was a severely dilated duct in this region  measuring up to 2.7 cm and correlates with a hypoattenuating area on  CT. Given cholangitis history, this is suspicious for an intrahepatic  abscess.  Additional collections have also increased in size/are new  from MRI (where there were severely dilated ducts and debris) and are  noted on CT. Limited assessment of these areas sonographically.  Consider close follow-up MRI/MRCP as clinically warranted.  3.  Enlarged lymph node adjacent to the pancreas. This previously  biopsied as sarcoid.  4.  Common bile duct not significantly distended. Biliary stent not  well shown sonographically.  5.  Ascites redemonstrated, decreased in volume given paracentesis  5/3/2021.    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 She was started on Zosyn    RECOMMENDATIONS:  1. Strep mitis bacteremia with sepsis  - strep mitis + on 4/29, follow up negative 5/1, 5/3. pansensitive  - has been on zosyn since 4/29-5/3. Ceftriaxone started 5/4  - more stable VS, did require pressor support at OSH  - echocardiogram negative for vegetations  - she does have some broken dentition, has seen dental as outpatient and she will need to see them after discharge again. Oral CT to evaluate for other sources of strep mitis infection.      2. SBP  3. Ascites  - initial para on 4/29 with 1094 PMN's, culture negative. Received 75 gm Albumin on 4/29, 50 gms on 5/3.   - now on ceftriaxone Will need SBP ppx with ciprofloxacin 500 mg daily when abx completed. Continue until findings of ERCP completed.   - para as needed- has outpatient scheduled next week. Will need para prior to discharge here.   - has been on low dose diuretics with lasix 20 mg daily and spironolactone 50 mg daily     4. PSC  5. Biliary  strictures/stones  - will need mammogram as outpatient to complete tx eval.   - Last ERCP on 1/6 with partially occluded stent and stones that was replaced. Discussing with advanced endoscopy timing of possible repeat ERCP to exchange or remove stents. She does have a cystic area in liver with biliary dilation. ERCP today.  - has been ursodiol 300 mg BID, may continue  - in transplant evaluation, MELD 14, ABO AB  - US 5/4 without evidence of HCC     6. Esophageal varices  - EGD 12/1/20 with gr II-III EV. Not banded at that time. Has been on nadolol  - If repeat ERCP performed, may need to consider banding if remains gr III.   - no current signs of bleeding.      7. Immunizations  - please start HBV/HAV vaccination prior to discharge.     Patient was discussed with attending physician, Dr. Leventhal    Next follow up appointment: Dr. Leventhal 6/21    Thank you for the opportunity to be involved with  Berta AYE Nava [facility]. Please call with any questions or concerns.    CHELSEY Leos, CNP  Inpatient Hepatology [place]  Text Link

## 2021-05-05 NOTE — ANESTHESIA CARE TRANSFER NOTE
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    Anesthesia Type:   General     Note:    Oropharynx: oropharynx clear of all foreign objects and spontaneously breathing  Level of Consciousness: awake  Oxygen Supplementation: nasal cannula  Level of Supplemental Oxygen (L/min / FiO2): 4  Independent Airway: airway patency satisfactory and stable  Dentition: dentition unchanged  Vital Signs Stable: post-procedure vital signs reviewed and stable  Report to RN Given: handoff report given  Patient transferred to: PACU    Handoff Report: Identifed the Patient, Identified the Reponsible Provider, Reviewed the pertinent medical history, Discussed the surgical course, Reviewed Intra-OP anesthesia mangement and issues during anesthesia, Set expectations for post-procedure period and Allowed opportunity for questions and acknowledgement of understanding      Vitals: (Last set prior to Anesthesia Care Transfer)  CRNA VITALS  5/5/2021 1458 - 5/5/2021 1537      5/5/2021             NIBP:  106/60    Pulse:  63    NIBP Mean:  82    Temp:  36.4  C (97.5  F)    SpO2:  96 %    Resp Rate (observed):  14        Electronically Signed By: CHELSEY Julian CRNA  May 5, 2021  3:37 PM

## 2021-05-05 NOTE — PLAN OF CARE
/78 (BP Location: Left arm)   Pulse 73   Temp 98  F (36.7  C) (Oral)   Resp 17   Wt 71.9 kg (158 lb 8 oz)   SpO2 99%   BMI 26.38 kg/m      [id number]  Patient A&O. VSS on RA. Up ad indy. Voiding, not saving. No bowel movement, prn senna given. NPO at 0000 for ERCP today. No blood sugar checks. Skin intact. No complaints of pain or nausea. RABIA RENAE. Plans to have ERCP with stent exchange completed today.     Will continue to monitor and update the team with any changes.

## 2021-05-05 NOTE — ANESTHESIA PREPROCEDURE EVALUATION
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]  Choledocholithiasis [K80.50]   Procedure : Procedure(s):  ENDOSCOPIC RETROGRADE CHOLANGIOPANCREATOGRAPHY with Stent Exchange     Past Medical History:   Diagnosis Date     Ascites      Biliary cirrhosis (H)      Cholangitis, sclerosing      Cirrhosis of liver with ascites (H) 3/3/2021     Hearing loss of left ear     wears a hearing aide     History of low potassium      Hyperlipidemia      Hypertension      SBP (spontaneous bacterial peritonitis) (H) 2021     Sjogren's syndrome (H)      Ulcerative colitis (H)      Ulcerative pancolitis (H)       Past Surgical History:   Procedure Laterality Date     GYN SURGERY      bilat fallopian tubes and ovaries removed      Allergies   Allergen Reactions     Diagnostic X-Ray Materials Hives     PATIENT HAD HIVE REACTION AFTER ADMINISTERING CT CONTRAST DYE.       Contrast Dye       Social History     Tobacco Use     Smoking status: Never Smoker     Smokeless tobacco: Never Used   Substance Use Topics     Alcohol use: No     Comment: Last drink was       Wt Readings from Last 1 Encounters:   21 71.9 kg (158 lb 8 oz)        Anesthesia Evaluation            ROS/MED HX  ENT/Pulmonary:  - neg pulmonary ROS     Neurologic:  - neg neurologic ROS     Cardiovascular:     (+) Dyslipidemia hypertension-----    METS/Exercise Tolerance:     Hematologic:  - neg hematologic  ROS     Musculoskeletal:  - neg musculoskeletal ROS     GI/Hepatic: Comment: cholangitis    (+) Inflammatory bowel disease, liver disease,     Renal/Genitourinary:  - neg Renal ROS     Endo:  - neg endo ROS     Psychiatric/Substance Use:  - neg psychiatric ROS     Infectious Disease:       Malignancy:       Other:  - neg other ROS          Physical Exam    Airway        Mallampati: II   TM distance: > 3 FB   Neck ROM: full   Mouth  opening: > 3 cm    Respiratory Devices and Support         Dental  no notable dental history         Cardiovascular   cardiovascular exam normal       Rhythm and rate: regular and normal     Pulmonary   pulmonary exam normal        breath sounds clear to auscultation           OUTSIDE LABS:  CBC:   Lab Results   Component Value Date    WBC 20.4 (H) 05/05/2021    WBC 17.6 (H) 05/04/2021    HGB 10.2 (L) 05/05/2021    HGB 10.7 (L) 05/04/2021    HCT 31.3 (L) 05/05/2021    HCT 33.3 (L) 05/04/2021     05/05/2021     05/04/2021     BMP:   Lab Results   Component Value Date     05/05/2021     05/04/2021    POTASSIUM 3.6 05/05/2021    POTASSIUM 3.4 05/04/2021    CHLORIDE 111 (H) 05/05/2021    CHLORIDE 112 (H) 05/04/2021    CO2 17 (L) 05/05/2021    CO2 18 (L) 05/04/2021    BUN 19 05/05/2021    BUN 22 05/04/2021    CR 0.53 05/05/2021    CR 0.74 05/04/2021     (H) 05/05/2021    GLC 82 05/04/2021     COAGS:   Lab Results   Component Value Date    INR 1.50 (H) 05/05/2021     POC:   Lab Results   Component Value Date     (H) 05/03/2021     HEPATIC:   Lab Results   Component Value Date    ALBUMIN 1.9 (L) 05/05/2021    PROTTOTAL 6.0 (L) 05/05/2021     (H) 05/05/2021     (H) 05/05/2021    ALKPHOS 828 (H) 05/05/2021    BILITOTAL 2.0 (H) 05/05/2021     OTHER:   Lab Results   Component Value Date    LACT 1.1 04/30/2021    A1C 4.3 04/29/2021    MARIELENA 8.8 05/05/2021    PHOS 2.6 05/16/2018    MAG 2.7 (H) 05/14/2018    LIPASE 142 05/13/2018       Anesthesia Plan    ASA Status:  2      Anesthesia Type: General.     - Airway: ETT   Induction: Intravenous.   Maintenance: Balanced.   Techniques and Equipment:     - Lines/Monitors: 2nd IV     Consents    Anesthesia Plan(s) and associated risks, benefits, and realistic alternatives discussed. Questions answered and patient/representative(s) expressed understanding.     - Discussed with:  Patient         Postoperative Care    Pain management: IV  analgesics.   PONV prophylaxis: Ondansetron (or other 5HT-3)     Comments:                Alfredo Justice DO

## 2021-05-05 NOTE — ANESTHESIA PROCEDURE NOTES
Airway       Patient location during procedure: OR  Staff -        CRNA: Mallorie Torres APRN CRNA       Performed By: CRNA  Consent for Airway        Urgency: elective  Indications and Patient Condition       Indications for airway management: gerardo-procedural       Induction type:intravenous       Mask difficulty assessment: 1 - vent by mask    Final Airway Details       Final airway type: endotracheal airway       Successful airway: ETT - single and Oral  Endotracheal Airway Details        ETT size (mm): 7.0       Cuffed: yes       Successful intubation technique: direct laryngoscopy       DL Blade Type: MAC 3       Grade View of Cords: 1       Adjucts: stylet       Position: Right       Measured from: gums/teeth       Secured at (cm): 21       Bite block used: Molar (endo bite block)    Post intubation assessment        Placement verified by: capnometry, equal breath sounds and chest rise        Number of attempts at approach: 1       Secured with: paper tape       Ease of procedure: easy       Dentition: Intact and Unchanged    Medication(s) Administered   Medication Administration Time: 5/5/2021 2:04 PM

## 2021-05-05 NOTE — PROGRESS NOTES
[facility]    Medicine Progress Note - Hospitalist Service, Gold 11    Pt is a 55 year old with hx of PSC with cirrhosis, biliary stents in place, ascites, UC on mesalamine, pancreatic mucinous cystadenoma, undergoing liver transplant evaluation who presented to [facility] with septic shock due to S. Mitis bacteremia and SBP. Stable on Zosyn, off pressors since 5/1, BCx NGTD since 5/1. Now admitted to [facility] 5/03 for further care. Transitioned to ceftriaxone for strep mitis bacteremia. Underwent ERCP 5/5 w/ one stent removed, one stent placed and large amounts of stone and pus removed suggestive of cholangitis. Also noted to have possible ~ 2 cm abscess/collection off of the left intrahepatic that could not be accessed. Will follow clinically.       Date of Admission:  5/3/2021  Assessment & Plan      Acute decompensated cirrhosis likely secondary to SBP  Primary sclerosing cholangitis with chronic cirrhosis  Recurrent ascites  Grade III esophageal varices not banded  Ascending cholangitis w/ possible hepatic abscess   Admitted to [facility] 4/29 with septic shock from strep mitis bacteremia and SBP. Paracentesis 4/29 with 1610 WBCs, and 60% neutrophils.  She was stabilized with fluids and abx and transferred to [facility]. Underwent ERCP 5/5 w/ one stent removed, one stent placed and large amounts of stone and pus removed suggestive of cholangitis. Also noted to have possible ~ 2 cm abscess/collection off of the left intrahepatic that could not be accessed. Will follow clinically.  - Hepatology consulted for further transplant workup  -Diuretic therapy reinitiated 5/3 after normalization of renal function; Lasix 20 mg daily, spironolactone 50 mg daily.  -Daily MELD labs.  - IF she clinically worsens may need repeat  MRCP w/ repeat ECP  - Will discuss with ID tomorrow regarding duration of abx given bacteremia and now likely abscess that has not been drained.     Spontaneous  bacterial peritonitis  Septic shock, resolved  Strep mitis bacteremia  Require Levophed for short duration at [facility] in [place] and now HDS. TTE w/o signs of vegetations on valves. Initial plan was for ceftriaxone for 10-14 days to cover the bacteremia but now with her possible abscess will consult ID in the AM re duration given inability to drain.  - Continue ceftiaxone   - CT facial bones to evaluate for abscess     Possible hematochezia  Patient notes some blood in the toilet 2 days prior to transfer but no stool output in the last 36 hours.  -She endorses feeling somewhat constipated.  -As needed senna and Dulcolax.     GONZALO, resolved  -Likely related to septic shock.    NAGMA  Started on initial presentation to [facility] due to GONZALO. Has remained stable will continue to monitor and workup further if bicarb continues to drop. No diarrhea. Could consider workup for RTA if persistent.  - BMP daily     Ulcerative colitis  -Continue PTA mesalamine.     Severe malnutrition in the context of acute on chronic illness  -Nutrition consult.     Hypertension  -Hold PTA lisinopril restart nadolol given varices     Hyperlipidemia  -Hold PTA statin.       Diet: Snacks/Supplements Adult: Other; Ensure Enlive Shake (chocolate) at HS snack time; Between Meals  2 Gram Sodium Diet    DVT Prophylaxis: Pneumatic Compression Devices  Jurado Catheter: not present  Code Status: Full Code           Disposition Plan   Expected discharge: 4 - 7 days, recommended to prior living arrangement once antibiotic plan established and safe disposition plan/ TCU bed available.  Entered: Ruben Hay DO 05/05/2021, 5:41 PM       The patient's care was discussed with the Bedside Nurse, Patient and hepatology, panc/bili Consultant.    Ruben Hay DO  Hospitalist Service, [address]  Contact information available via [facility] Paging/Directory  Please see sign in/sign out for up to date  coverage information  ______________________________________________________________________    Interval History     No acute events overnight. Has remained afebrile although did have a rise in white count. Otherwise doing well. Notes some increased distension of abdomen but it is not painful or too bothersome right now.    Four point ROS completed and otherwise negative    Data reviewed today: I reviewed all medications, new labs and imaging results over the last 24 hours. I personally reviewed no images or EKG's today.    Physical Exam   Vital Signs: Temp: 97.5  F (36.4  C) Temp src: Oral BP: 110/76 Pulse: 55   Resp: 16 SpO2: 100 % O2 Device: None (Room air) Oxygen Delivery: 2 LPM  Weight: 155 lbs 0 oz    Gen: awake and alert, appears comfortable, appears stated age  HEENT: NCAT, scleral icterus present  CV: regular rate with regular rhythm, S1/S2, extremities warm and well perfused, trace bilateral lower extremity 1+ edema  Pulm: normal work of breathing, lungs clear to auscultation, no crackles or wheezing  GI: Nontender, abdomen is distended but soft and not painful  Skin: warm, diffusely jaundiced  Neuro: Aox3, speech normal, moves all extremities symmetrically and equally, no asterixis or clonus  Psych: mood is good, affect is congruent    Data   Recent Labs   Lab 05/05/21  0605 05/04/21  0739 05/02/21  1241 05/02/21  1241 05/01/21  0420   WBC 20.4* 17.6*  --  25.0* 27.8*   HGB 10.2* 10.7*  --  11.5* 9.3*   MCV 93 94  --  95 90    203  --  320 328   INR 1.50* 1.54*  --   --  1.60*    139  --   --  140   POTASSIUM 3.6 3.4  --   --  3.8   CHLORIDE 111* 112*  --   --  115*   CO2 17* 18*  --   --  18*   BUN 19 22  --   --  44*   CR 0.53 0.74  --   --  0.89   ANIONGAP 10 9  --   --  7   MARIELENA 8.8 9.2  --   --  9.0   * 82  --   --  126*   ALBUMIN 1.9* 2.1*   < >  --  1.5*   PROTTOTAL 6.0* 6.2*   < >  --  6.1*   BILITOTAL 2.0* 2.6*   < >  --  2.1*   ALKPHOS 828* 737*   < >  --  472*   * 126*    < >  --  117*   * 153*   < >  --  181*    < > = values in this interval not displayed.     Recent Results (from the past 24 hour(s))   CT Dental wo Contrast    Narrative    CT DENTAL WO CONTRAST 5/5/2021 10:53 AM    History:  evaluate for abscess given strep mitis bacteremia    Comparison:  None available      TECHNIQUE: 3-D reconstruction by the technologists, with curved  multiplanar reformat of thin section imaging through the mandible and  maxilla obtained without intravenous contrast.    FINDINGS:  No significant soft tissue swelling or mass. Normal facial bone  alignment. No bony erosion. Large dental caries of left maxillary  second premolar without significant periapical disease. The orbits are  unremarkable.    Visualized portions of the paranasal sinuses are clear. Degenerative  changes of the right temporomandibular joint with mild mandibular head  subchondral erosion. The left temporomandibular joint is normal.      Impression    IMPRESSION:  No periapical abscess. Large dental caries of the left maxillary  second premolar.    I have personally reviewed the examination and initial interpretation  and I agree with the findings.    HAZEL PAK MD   XR Surgery CARRIE Fluoro L/T 5 Min w Stills    Narrative    This exam was marked as non-reportable because it will not be read by a   radiologist or a [place] non-radiologist provider.           Medications     - MEDICATION INSTRUCTIONS -         cefTRIAXone  2 g Intravenous Q24H     furosemide  20 mg Oral Daily     mesalamine  2,400 mg Oral Daily with breakfast     multivitamin w/minerals  1 tablet Oral Daily     nadolol  60 mg Oral Daily     potassium chloride ER  10 mEq Oral At Bedtime     simvastatin  20 mg Oral At Bedtime     sodium chloride (PF)  3 mL Intracatheter Q8H     spironolactone  50 mg Oral Daily     ursodiol  300 mg Oral BID

## 2021-05-05 NOTE — PLAN OF CARE
VSS.  Denies pain/nausea.  Patient able to take all AM meds with a small amt. Of water.  Sl'd PIV. Able to get shower this morning.  NPO for ERCP this afternoon.  Toilet voiding.  Not saving.  Left unit via wc at 1230.  Report given to Mattie on 3C.  Await return from procedure.  Continue to monitor/support.

## 2021-05-06 ENCOUNTER — HOME INFUSION (PRE-WILLOW HOME INFUSION) (OUTPATIENT)
Dept: PHARMACY | Facility: CLINIC | Age: 56
End: 2021-05-06

## 2021-05-06 ENCOUNTER — APPOINTMENT (OUTPATIENT)
Dept: OCCUPATIONAL THERAPY | Facility: CLINIC | Age: 56
DRG: 444 | End: 2021-05-06
Attending: INTERNAL MEDICINE
Payer: COMMERCIAL

## 2021-05-06 LAB
ALBUMIN SERPL-MCNC: 2.1 G/DL (ref 3.4–5)
ALP SERPL-CCNC: 758 U/L (ref 40–150)
ALT SERPL W P-5'-P-CCNC: 89 U/L (ref 0–50)
ANION GAP SERPL CALCULATED.3IONS-SCNC: 11 MMOL/L (ref 3–14)
AST SERPL W P-5'-P-CCNC: 69 U/L (ref 0–45)
BASOPHILS # BLD AUTO: 0 10E9/L (ref 0–0.2)
BASOPHILS NFR BLD AUTO: 0.1 %
BILIRUB SERPL-MCNC: 1.9 MG/DL (ref 0.2–1.3)
BUN SERPL-MCNC: 24 MG/DL (ref 7–30)
CALCIUM SERPL-MCNC: 9 MG/DL (ref 8.5–10.1)
CANCER AG19-9 SERPL-ACNC: 115 U/ML (ref 0–37)
CHLORIDE SERPL-SCNC: 107 MMOL/L (ref 94–109)
CO2 SERPL-SCNC: 18 MMOL/L (ref 20–32)
CREAT SERPL-MCNC: 0.61 MG/DL (ref 0.52–1.04)
DIFFERENTIAL METHOD BLD: ABNORMAL
EOSINOPHIL # BLD AUTO: 0 10E9/L (ref 0–0.7)
EOSINOPHIL NFR BLD AUTO: 0.1 %
ERYTHROCYTE [DISTWIDTH] IN BLOOD BY AUTOMATED COUNT: 16.5 % (ref 10–15)
GFR SERPL CREATININE-BSD FRML MDRD: >90 ML/MIN/{1.73_M2}
GLUCOSE SERPL-MCNC: 183 MG/DL (ref 70–99)
HCT VFR BLD AUTO: 31.6 % (ref 35–47)
HGB BLD-MCNC: 10.3 G/DL (ref 11.7–15.7)
IMM GRANULOCYTES # BLD: 0.2 10E9/L (ref 0–0.4)
IMM GRANULOCYTES NFR BLD: 1.4 %
INR PPP: 1.46 (ref 0.86–1.14)
LYMPHOCYTES # BLD AUTO: 0.7 10E9/L (ref 0.8–5.3)
LYMPHOCYTES NFR BLD AUTO: 4.9 %
MCH RBC QN AUTO: 30.5 PG (ref 26.5–33)
MCHC RBC AUTO-ENTMCNC: 32.6 G/DL (ref 31.5–36.5)
MCV RBC AUTO: 94 FL (ref 78–100)
MONOCYTES # BLD AUTO: 0.5 10E9/L (ref 0–1.3)
MONOCYTES NFR BLD AUTO: 3.2 %
NEUTROPHILS # BLD AUTO: 13.4 10E9/L (ref 1.6–8.3)
NEUTROPHILS NFR BLD AUTO: 90.3 %
NRBC # BLD AUTO: 0 10*3/UL
NRBC BLD AUTO-RTO: 0 /100
PLATELET # BLD AUTO: 168 10E9/L (ref 150–450)
POTASSIUM SERPL-SCNC: 3.6 MMOL/L (ref 3.4–5.3)
PROT SERPL-MCNC: 6.4 G/DL (ref 6.8–8.8)
RBC # BLD AUTO: 3.38 10E12/L (ref 3.8–5.2)
SODIUM SERPL-SCNC: 136 MMOL/L (ref 133–144)
WBC # BLD AUTO: 14.8 10E9/L (ref 4–11)

## 2021-05-06 PROCEDURE — 99233 SBSQ HOSP IP/OBS HIGH 50: CPT | Performed by: NURSE PRACTITIONER

## 2021-05-06 PROCEDURE — 250N000013 HC RX MED GY IP 250 OP 250 PS 637: Performed by: INTERNAL MEDICINE

## 2021-05-06 PROCEDURE — 120N000011 HC R&B TRANSPLANT UMMC

## 2021-05-06 PROCEDURE — 85610 PROTHROMBIN TIME: CPT | Performed by: INTERNAL MEDICINE

## 2021-05-06 PROCEDURE — 99223 1ST HOSP IP/OBS HIGH 75: CPT | Mod: GC | Performed by: INTERNAL MEDICINE

## 2021-05-06 PROCEDURE — 99233 SBSQ HOSP IP/OBS HIGH 50: CPT | Performed by: STUDENT IN AN ORGANIZED HEALTH CARE EDUCATION/TRAINING PROGRAM

## 2021-05-06 PROCEDURE — 250N000013 HC RX MED GY IP 250 OP 250 PS 637: Performed by: STUDENT IN AN ORGANIZED HEALTH CARE EDUCATION/TRAINING PROGRAM

## 2021-05-06 PROCEDURE — 250N000011 HC RX IP 250 OP 636: Performed by: INTERNAL MEDICINE

## 2021-05-06 PROCEDURE — 85025 COMPLETE CBC W/AUTO DIFF WBC: CPT | Performed by: INTERNAL MEDICINE

## 2021-05-06 PROCEDURE — 80053 COMPREHEN METABOLIC PANEL: CPT | Performed by: INTERNAL MEDICINE

## 2021-05-06 PROCEDURE — 36415 COLL VENOUS BLD VENIPUNCTURE: CPT | Performed by: INTERNAL MEDICINE

## 2021-05-06 PROCEDURE — 97110 THERAPEUTIC EXERCISES: CPT | Mod: GO | Performed by: OCCUPATIONAL THERAPIST

## 2021-05-06 RX ORDER — METRONIDAZOLE 500 MG/1
500 TABLET ORAL 3 TIMES DAILY
Status: DISCONTINUED | OUTPATIENT
Start: 2021-05-06 | End: 2021-05-12 | Stop reason: HOSPADM

## 2021-05-06 RX ORDER — CARVEDILOL 6.25 MG/1
6.25 TABLET ORAL 2 TIMES DAILY WITH MEALS
Status: DISCONTINUED | OUTPATIENT
Start: 2021-05-07 | End: 2021-05-12 | Stop reason: HOSPADM

## 2021-05-06 RX ORDER — POLYETHYLENE GLYCOL 3350 17 G/17G
17 POWDER, FOR SOLUTION ORAL 2 TIMES DAILY PRN
Status: DISCONTINUED | OUTPATIENT
Start: 2021-05-06 | End: 2021-05-12 | Stop reason: HOSPADM

## 2021-05-06 RX ADMIN — FUROSEMIDE 20 MG: 20 TABLET ORAL at 08:23

## 2021-05-06 RX ADMIN — NADOLOL 60 MG: 40 TABLET ORAL at 08:19

## 2021-05-06 RX ADMIN — DOCUSATE SODIUM 50 MG AND SENNOSIDES 8.6 MG 2 TABLET: 8.6; 5 TABLET, FILM COATED ORAL at 16:57

## 2021-05-06 RX ADMIN — POLYETHYLENE GLYCOL 3350 17 G: 17 POWDER, FOR SOLUTION ORAL at 11:25

## 2021-05-06 RX ADMIN — POLYETHYLENE GLYCOL 3350 17 G: 17 POWDER, FOR SOLUTION ORAL at 16:57

## 2021-05-06 RX ADMIN — CEFTRIAXONE 2 G: 2 INJECTION, POWDER, FOR SOLUTION INTRAMUSCULAR; INTRAVENOUS at 16:44

## 2021-05-06 RX ADMIN — POTASSIUM CHLORIDE 10 MEQ: 750 CAPSULE, EXTENDED RELEASE ORAL at 22:13

## 2021-05-06 RX ADMIN — URSODIOL 300 MG: 300 CAPSULE ORAL at 22:13

## 2021-05-06 RX ADMIN — MESALAMINE 2400 MG: 1.2 TABLET, DELAYED RELEASE ORAL at 08:20

## 2021-05-06 RX ADMIN — SIMVASTATIN 20 MG: 5 TABLET, FILM COATED ORAL at 22:13

## 2021-05-06 RX ADMIN — URSODIOL 300 MG: 300 CAPSULE ORAL at 08:19

## 2021-05-06 RX ADMIN — SPIRONOLACTONE 50 MG: 50 TABLET, FILM COATED ORAL at 08:20

## 2021-05-06 RX ADMIN — METRONIDAZOLE 500 MG: 500 TABLET ORAL at 18:46

## 2021-05-06 RX ADMIN — Medication 1 TABLET: at 08:19

## 2021-05-06 ASSESSMENT — ACTIVITIES OF DAILY LIVING (ADL)
ADLS_ACUITY_SCORE: 13

## 2021-05-06 NOTE — PLAN OF CARE
Removed CAPNO due to faulty waveform pickup and placed her on continuous pulse ox and she continues to % on room air and heart rate in the upper 50's.  Berta denies any discomfort and feels that she is breathing without difficulty. Will continue to monitor and report any significant changes.

## 2021-05-06 NOTE — PROGRESS NOTES
Care Management Initial Consult    General Information  Assessment completed with: Patient, Care Team Member, -chart review,    Type of CM/SW Visit: Initial Assessment    Primary Care Provider verified and updated as needed: Yes   Readmission within the last 30 days:        Reason for Consult: care coordination/care conference, discharge planning  Advance Care Planning:            Communication Assessment  Patient's communication style: spoken language (English or Bilingual)    Hearing Difficulty or Deaf: no   Wear Glasses or Blind: yes    Cognitive  Cognitive/Neuro/Behavioral: WDL  Level of Consciousness: alert  Arousal Level: opens eyes spontaneously  Orientation: oriented x 4  Mood/Behavior: calm, cooperative  Best Language: 0 - No aphasia  Speech: clear, spontaneous    Living Environment:   People in home: spouse     Current living Arrangements: house      Able to return to prior arrangements: yes       Family/Social Support:  Care provided by: self  Provides care for: no one  Marital Status:   , Children          Description of Support System: Supportive, Involved         Current Resources:   Patient receiving home care services: No     Community Resources: None  Equipment currently used at home: none  Supplies currently used at home: None    Employment/Financial:  Employment Status:          Financial Concerns: No concerns identified           Lifestyle & Psychosocial Needs:        Socioeconomic History     Marital status:      Spouse name: Not on file     Number of children: Not on file     Years of education: Not on file     Highest education level: Not on file     Tobacco Use     Smoking status: Never Smoker     Smokeless tobacco: Never Used   Substance and Sexual Activity     Alcohol use: No     Comment: Last drink was 2017     Drug use: No       Additional Information:  Pt status reviewed/discussed during care team rounds.  Team anticipates pt will need a course of IV antibiotics  when cleared for discharge.  ID consulted.    Met with pt.  Introduced RNCC role.  Reviewed anticipated plan for discharge. Discussed/reviewed option of home infusion vs outpatient infusion center.  Pt would prefer to learn to manage IV antibiotics at home.  Reviewed/discussed PLC.  Reviewed plan to initiate referral to [facility].   Pt notes no concern or questions at this time.    Per home infusion protocol email referral made to [facility].    PLC request placed.    Discharge home infusion order updated.     Brissa Ruelas RN BSN, PHN, ACM-RN  7A RN Care Coordinator  Phone: [phone]  Pager [phone]    5/6/2021 3:15 PM

## 2021-05-06 NOTE — PLAN OF CARE
"/77 (BP Location: Left arm)   Pulse 62   Temp 97.9  F (36.6  C) (Oral)   Resp 16   Ht 1.651 m (5' 5\")   Wt 70.3 kg (155 lb)   SpO2 96%   BMI 25.79 kg/m      9088-4654: Afebrile. OVSS on RA. Capno discontinued but patient remains on continuous pulse oximetry. INR 1.46. Patient denied pain or nausea on this shift. 2gm Na+ diet maintained and patient had fair PO intake. PIV in R FA and L FA both saline locked. Patient voiding spontaneously without difficulty. No BM on this shift. Patient UAL in room independently. Will continue to monitor and update providers with any concerns.   "

## 2021-05-06 NOTE — CONSULTS
[facility] CONSULT[facility]     Patient:  Berta Nava   YOB: 1965  Date of Visit:  05/06/2021  Date of Admission: 5/3/2021  Consult Requester:Ruben Hay DO ID Problem List:  1. Strep mitis bacteremia   2. 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 Ascending cholangitis with hepatic abscess   4. SBP    Assessment and Discussion:  It is hard to say what initially lead to Ms Nava's decompensation. She certainly could have had brewing cholangitis related to her intrahepatic stents which resulted in translocation of bacteria causing both her bacteremia and peritonitis. There is no way for certain to say whether her peritonitis is true SBP vs secondary peritonitis in the setting of cholangitis so we would favor treating conservatively as SBP. She had a CT on admission which was not revealing however the US report from 5/4 suggests there were hypoattenuating collections. Unclear if these were true abscesses or related to bilary stent occlusion. The ERCP 5/5 reports evacuation of large amount of pus which hopefully has improved the status of these collections. There was this 2cm possible abscess visualized on ERCP which was nonamenable to endoscopic drainage. Would discuss if it would be amenable to percutaneous drainage with IR. We recommend repeat US following ERCP to re-evaluate status of collections reported from US on 5/4 following intervention.     Most hepatic abscesses are polymicrobial, and streptococcus mitis can colonize the GI tract. Agree with choice of Ceftriaxone which should broadly cover the Strep mitis and any GNRs that may be present. Would add Metronidazole for augmented anaerobic coverage. She will need to complete 2 weeks of IV CTX for her Strep mitis bacteremia. This should also cover duration needed for SBP. Hepatic abscesses are generally treated anywhere from 2-4 weeks, and we would recommend repeat imaging following  2 week treatment of bacteremia to help us determine ultimate duration for hepatic abscesses.     Recommendations:  - Start Metronidazole 500mg Q8H   - Continue Ceftriaxone 2g Q24H   - Recommend repeat RUQ US following ERCP to evaluate for any persistent collections and for visualization of L sided abscess following ERCP  - Pending US recommend discussion with IR to determine if the 2cm abscess visualized on ERCP would be amenable to percutaneous drainage, this would reduce duration of antibiotics needed for hepatic abscess   - Plan for 2 weeks IV antibiotics for treatment of Strep Mitis, after 2 weeks we will recommend repeat RUQ US to determine final antibiotic duration for hepatic abscesses   - Pt will need SBP ppx following antibiotic courses       This patient was discussed with the attending physician , who agrees with the above assessment and plan    Thank you for the consult. ID will continue to follow with you.     Susana Wisdom MD   PGY2 Internal Medicine   05/06/2021  ________________________________________________________________    Consult Question: Hx of cirrhosis here w/ strep mitis bacteremia, SBP, cholangitis found to have likely abscess on ERCP that could not be drained. Question duration of abx in this setting  Admission Diagnosis: SBP (spontaneous bacterial peritonitis) (H) [K65.2]         History of Present Illness:   History obtained from review of chart and discussion with patient.     Berta Nava is a 55 year old female with a PMH of cirrhosis 2/2 primary sclerosing cholangitis c/b ascites, esophageal varices, UC stable on mesalamine who presents as a transfer from [facility] where she was hospitalized from 4/29 - 5/3 with septic shock and decompensated cirrhosis.     She states that for 1-2 weeks prior to her admission she did not feel well. She describes fatigue, generalized malaise. Approximately 3-4 days prior to admission she started to develop fevers at night. As  "high as 101 at home. She was getting chills and sweats occasionally to the point where he  mentioned that she did not look well. She denies any clear localizing symptoms at that time such as SOB, cough, CP, significant abdominal pain, n/v/d/c, dysuria. Her abdomen is fairly distended from her ascites and this causes her some discomfort. She has recently started to get paracenteses recently on 4/13 (5L removal). She went to an appointment for a scheduled paracentesis on 4/28 and was noted to be significantly hypotensive (SBPs reported to be as low as 60s) and so was sent to the ED. At time of presentation she had labs notable for WBC 43, Procal 5.41, Cr 2.28 (from normal bl), LFTs above baseline. She had a paracentesis done with WBC 1610 68% PMNS, cultures remain negative. Blood cultures positive for Streptococcus mitis. She was admitted to the ICU for septic shock and was started on Zosyn. CT abdomen at that time was not particularly revealing. She improved over the next few days and pressors were weaned off. She was ultimately transferred to [facility] for continued management. Pt remained vitally stable here however alk phos had significantly uptrended from 472 to 719. On 5/3 she underwent repeat paracentesis  31% PMN. She underwent RUQ US on 5/4 which revealed, \"septated cystic collection in the right hepatic lobe demonstrated measuring up to 5.2 cm. This appears to have increased in size from MRI where there was a severely dilated duct in this region measuring up to 2.7 cm and correlates with a hypoattenuating area on CT. Given cholangitis history, this is suspicious for an intrahepatic abscess. Additional collections have also increased in size/are new from MRI (where there were severely dilated ducts and debris) and are noted on CT.\" She was transitioned to Ceftriaxone 5/4 and has remained afebrile and vitally stable. She underwent ERCP on 5/5 and they removed an occluded stent, a large amount of pus " was evacuated, and a small 2cm collection off the L intrahepatic duct was identified but was not amenable to endoscopic intervention.     She tells me today that she is feeling well. The best she's felt in the past few weeks. She denies subjective fevers, chills, sweats. She denies significant abdominal pain, n,v. She has had issues with constipation over the past few days but sound like she is trialing different medications for this. She denies SOB, cough. She has not felt sick recently.            Review of Systems:   10 point review of systems was negative except for noted as above in HPI.          Past Medical History:     Past Medical History:   Diagnosis Date     Ascites      Biliary cirrhosis (H)      Cholangitis, sclerosing      Cirrhosis of liver with ascites (H) 3/3/2021     Hearing loss of left ear     wears a hearing aide     History of low potassium      Hyperlipidemia      Hypertension      SBP (spontaneous bacterial peritonitis) (H) 4/30/2021     Sjogren's syndrome (H)      Ulcerative colitis (H)      Ulcerative pancolitis (H)             Past Surgical History:     Past Surgical History:   Procedure Laterality Date     ENDOSCOPIC RETROGRADE CHOLANGIOPANCREATOGRAPHY, EXCHANGE TUBE/STENT N/A 5/5/2021    Procedure: ENDOSCOPIC RETROGRADE CHOLANGIOPANCREATOGRAPHY WITH STENT EXCHANGE, STONE EXTRACTION, AND DILATION;  Surgeon: Gregory Gabriel MD;  Location: [facility] OR     GYN SURGERY      bilat fallopian tubes and ovaries removed            Family History:   Reviewed and non-contributory.          Social History:     Social History     Tobacco Use     Smoking status: Never Smoker     Smokeless tobacco: Never Used   Substance Use Topics     Alcohol use: No     Comment: Last drink was 2017     History   Sexual Activity     Sexual activity: Not on file   Denies substance use. Lives in a home with her  who has been well. Denies sick contacts. Denies recent dental procedures.          Current Medications:        cefTRIAXone  2 g Intravenous Q24H     furosemide  20 mg Oral Daily     mesalamine  2,400 mg Oral Daily with breakfast     multivitamin w/minerals  1 tablet Oral Daily     nadolol  60 mg Oral Daily     potassium chloride ER  10 mEq Oral At Bedtime     simvastatin  20 mg Oral At Bedtime     sodium chloride (PF)  3 mL Intracatheter Q8H     spironolactone  50 mg Oral Daily     ursodiol  300 mg Oral BID            Allergies:     Allergies   Allergen Reactions     Diagnostic X-Ray Materials Hives     PATIENT HAD HIVE REACTION AFTER ADMINISTERING CT CONTRAST DYE.       Contrast Dye             Physical Exam:   Vitals were reviewed  Temp:  [95.7  F (35.4  C)-97.9  F (36.6  C)] 97.9  F (36.6  C)  Pulse:  [52-85] 62  Resp:  [11-16] 16  BP: ()/(60-90) 129/77  SpO2:  [94 %-100 %] 96 %    Vitals:    05/03/21 1842 05/05/21 1247   Weight: 71.9 kg (158 lb 8 oz) 70.3 kg (155 lb)       Constitutional: well appearing woman in NAD. Awake, alert, interactive. Lying comfortably in bed.  HEENT: NC/AT, EOMI, sclera clear, conjunctiva normal, OP with MMM  Respiratory: No increased work of breathing, CTAB, no crackles or wheezing.  Cardiovascular: RRR, no murmur noted. No peripheral edema.  GI: Hyperactive bowel sounds, abdomen is distended but is relatively soft and nontender to palpation. There is no rigidity or rebound ttp  Skin: Warm, dry, well-perfused. No bruising, bleeding, rashes, or lesions on limited exam.  Musculoskeletal: Extremities grossly normal, non-tender, no edema.   Neurologic: A&O. Answers questions appropriately, speech normal. Moves all extremities spontaneously.  Neuropsychiatric: Calm. Affect appropriate to situation.         Laboratory Data:     Microbiology:  Culture Micro   Date Value Ref Range Status   05/03/2021 No growth after 3 days  Preliminary   05/03/2021 No growth after 3 days  Preliminary   05/03/2021 Culture negative monitoring continues  Preliminary   05/01/2021 No growth after 5 days  Preliminary    04/29/2021 No growth  Final   04/29/2021 (A)  Final    Cultured on the 1st day of incubation:  Streptococcus mitis     04/29/2021   Final    Critical Value/Significant Value, preliminary result only, called to and read back by  Sue Gutierrez RN on 4.30.2021 at 0942. KVO     04/29/2021 Susceptibility testing done on previous specimen  Final   04/29/2021 No growth  Final   04/29/2021 (A)  Final    Cultured on the 1st day of incubation:  Streptococcus mitis     04/29/2021   Final    Critical Value/Significant Value, preliminary result only, called to and read back by  Shahbaz Hill RN @ 0410 4/30/21 TM.     04/29/2021   Final    (Note)  POSITIVE for STREPTOCOCCUS SPECIES OTHER THAN pneumococcus, anginosus  group, S. pyogenes and S. agalactiae. Performed using [employer] nucleic acid test. Final identification and antimicrobial  susceptibility testing will be verified by standard methods.    Specimen tested with [facility] multiplex, gram-positive blood culture  nucleic acid test for the following targets: Staph aureus, Staph  epidermidis, Staph lugdunensis, other Staph species, Enterococcus  faecalis, Enterococcus faecium, Streptococcus species, S. agalactiae,  S. anginosus grp., S. pneumoniae, S. pyogenes, Listeria sp., mecA  (methicillin resistance) and Fanny/B (vancomycin resistance).    Critical Value/Significant Value called to and read back by  Yousuf Traylor RN 4/30/21 @ 0649 TF     05/13/2018 No growth  Final   05/13/2018 No growth  Final       Inflammatory Markers    No lab results found.    Metabolic Studies       Recent Labs   Lab Test 05/06/21  0544 05/05/21  0605 05/04/21  0739 05/01/21  0420 04/30/21  1630 04/30/21  0455 04/29/21  1840 04/29/21  0942 05/16/18  0650 05/16/18  0650 05/14/18  0653 05/14/18  0653 05/13/18  0935 05/13/18  0935    138 139 140 136 129*  --  127*   < > 138   < > 135  --  131*   POTASSIUM 3.6 3.6 3.4 3.8 3.8 4.7  --  5.3   < > 3.8   < > 3.1*   < > 2.1*   CHLORIDE  107 111* 112* 115* 110* 102  --  98   < > 109   < > 102  --  95   CO2 18* 17* 18* 18* 17* 17*  --  19*   < > 19*   < > 23  --  25   ANIONGAP 11 10 9 7 9 10  --  10   < > 10   < > 10  --  11   BUN 24 19 22 44* 58* 73*  --  76*   < > 7   < > 10  --  13   CR 0.61 0.53 0.74 0.89 1.20* 1.69*  --  2.28*   < > 0.60   < > 0.83  --  0.62   GFRESTIMATED >90 >90 90 72 50* 33*  --  23*   < > >90   < > 71  --  >90   * 100* 82 126* 161* 196*  --  71   < > 73   < > 80  --  121*   A1C  --   --   --   --   --   --  4.3  --   --   --   --   --   --   --    MARIELENA 9.0 8.8 9.2 9.0 8.8 9.3  --  10.3*   < > 8.4*   < > 8.0*  --  8.5   PHOS  --   --   --   --   --   --   --   --   --  2.6  --   --   --   --    MAG  --   --   --   --   --   --   --   --   --   --   --  2.7*  --  1.9   LACT  --   --   --   --   --  1.1  --  1.7  --   --   --   --   --  1.4    < > = values in this interval not displayed.       Hepatic Studies    Recent Labs   Lab Test 05/06/21  0544 05/05/21  0605 05/04/21  0739 05/03/21  1326 05/01/21  0420 04/30/21  0455 04/29/21  0942   BILITOTAL 1.9* 2.0* 2.6* 1.9* 2.1* 3.3* 3.2*   ALKPHOS 758* 828* 737* 719* 472* 387* 409*   ALBUMIN 2.1* 1.9* 2.1* 2.5*  2.4* 1.5*  --  1.5*   AST 69* 122* 153* 212* 181* 198* 244*   ALT 89* 110* 126* 150* 117* 128* 143*       Pancreatitis testing    Recent Labs   Lab Test 05/13/18  0935   LIPASE 142       Hematology Studies      Recent Labs   Lab Test 05/06/21  0544 05/05/21  0605 05/04/21  0739 05/02/21  1241 05/01/21  0420 04/30/21  0455 04/29/21  0942   WBC 14.8* 20.4* 17.6* 25.0* 27.8* 36.8* 43.2*   ANEU 13.4* 16.8* 14.3*  --  25.2* 35.3* 39.1*   ALYM 0.7* 1.6 1.8  --  1.2 0.7* 1.2   BRIGIDO 0.5 0.9 0.7  --  1.0 0.7 1.2   AEOS 0.0 0.6 0.4  --  0.0 0.0 0.0   HGB 10.3* 10.2* 10.7* 11.5* 9.3* 9.7* 9.7*   HCT 31.6* 31.3* 33.3* 35.8 27.5* 28.0* 29.4*    200 203 320 328 434 427       Arterial Blood Gas Testing  No lab results found.     Urine Studies     Recent Labs   Lab Test  04/29/21  1106   URINEPH 5.5   NITRITE Negative   LEUKEST Negative   WBCU 15*     Last check of C difficile  C Diff Toxin B PCR   Date Value Ref Range Status   05/16/2018 Negative NEG^Negative Final     Comment:     Negative: Clostridium difficile target DNA sequences NOT detected, presumed   negative for Clostridium difficile toxin B or the number of bacteria present   may be below the limit of detection for the test.  FDA approved assay performed using [employer] GeneXpert real-time PCR.  A negative result does not exclude actual disease due to Clostridium difficile   and may be due to improper collection, handling and storage of the specimen   or the number of organisms in the specimen is below the detection limit of the   assay.                Imaging:     Results for orders placed or performed during the hospital encounter of 05/03/21   US Abdomen Limited    Narrative    EXAMINATION: Limited Abdominal Ultrasound, 5/4/2021 8:49 AM     COMPARISON: CT abdomen and pelvis without contrast 4/29/2021.  Ultrasound abdomen 4/29/2021 and 3/23/2021. MRI abdomen 4/22/2021.    HISTORY: Worsening liver function tests in patient with decompensated  cirrhosis, history of cholangitis and biliary stent.    FINDINGS:   Fluid: Small volume of ascites.    Liver: The liver demonstrates markedly coarsened heterogeneous  echotexture, measuring 16.8 cm in craniocaudal dimension. Distorted  anatomy secondary to underlying chronic liver disease. Perihepatic  ascites present. There is a septated cystic-appearing area in the  posterior right hepatic lobe measuring 5.2 x 3.7 x 3.9 cm. No internal  vascularity. On CT, there is a hypoattenuating 6.8 x 5 cm collection  in this region. Additional markedly dilated ducts and possible  collections in the caudate and central right hepatic lobe not well  visualized sonographically. On MRI there are dilated ducts in this  region with a dilated portion measuring up to 2.7 cm. The biliary tree  is not  well evaluated sonographically in the liver due to marked  heterogeneity. Main portal vein is patent with antegrade flow.    Gallbladder: Gallbladder is slightly distended. Layering sludge and  stones better visualized on CT and MRI. There is no positive  sonographic Javier's sign. Gallbladder wall is mildly thickened at 4  mm with some mild edema likely reactive given liver disease and  ascites.    Bile Ducts: Both the intra- and extrahepatic biliary system are of  normal caliber.  The common bile duct measures 4 mm in diameter.  Biliary stent not well-visualized sonographically.    Pancreas: Limited visualization. Nodule adjacent to the pancreatic  head measuring 2.7 x 1.3 x 2.5 cm likely a lymph node when correlated  to prior CT and MRI. These have been previously biopsied as sarcoid  when correlated to prior MRI.    Kidney: The right kidney measures 12.4 cm long. There is no  hydronephrosis or hydroureter, no shadowing renal calculi, cystic  lesion or mass.       Impression    IMPRESSION:   1.  Changes from hepatic cirrhosis with marked heterogeneity and  distorted anatomy.  2.  Complex septated cystic collection in the right hepatic lobe  demonstrated measuring up to 5.2 cm. This appears to have increased in  size from MRI where there was a severely dilated duct in this region  measuring up to 2.7 cm and correlates with a hypoattenuating area on  CT. Given cholangitis history, this is suspicious for an intrahepatic  abscess. Additional collections have also increased in size/are new  from MRI (where there were severely dilated ducts and debris) and are  noted on CT. Limited assessment of these areas sonographically.  Consider close follow-up MRI/MRCP as clinically warranted.  3.  Enlarged lymph node adjacent to the pancreas. This previously  biopsied as sarcoid.  4.  Common bile duct not significantly distended. Biliary stent not  well shown sonographically.  5.  Ascites redemonstrated, decreased in volume  given paracentesis  5/3/2021.    VIDHYA TREADWELL MD   CT Dental wo Contrast    Narrative    CT DENTAL WO CONTRAST 2021 10:53 AM    History:  evaluate for abscess given strep mitis bacteremia    Comparison:  None available      TECHNIQUE: 3-D reconstruction by the technologists, with curved  multiplanar reformat of thin section imaging through the mandible and  maxilla obtained without intravenous contrast.    FINDINGS:  No significant soft tissue swelling or mass. Normal facial bone  alignment. No bony erosion. Large dental caries of left maxillary  second premolar without significant periapical disease. The orbits are  unremarkable.    Visualized portions of the paranasal sinuses are clear. Degenerative  changes of the right temporomandibular joint with mild mandibular head  subchondral erosion. The left temporomandibular joint is normal.      Impression    IMPRESSION:  No periapical abscess. Large dental caries of the left maxillary  second premolar.    I have personally reviewed the examination and initial interpretation  and I agree with the findings.    HAZEL PAK MD   XR Surgery CARRIE Fluoro L/T 5 Min w Stills    Narrative    This exam was marked as non-reportable because it will not be read by a   radiologist or a [facility] non-radiologist provider.         Echo Complete    Narrative    [id number]  [id number]  [id number]  [id number]^TAI^EULOGIO     [facility],[place]  Echocardiography Laboratory  [address] [place], [place] [address]     Name: ESTUARDO JOYCE  MRN: [id number]  : 1965  Study Date: 2021 01:08 PM  Age: 55 yrs  Gender: Female  Patient Location: [facility]  Reason For Study: Endocarditis  Ordering Physician: EULOGIO LUJAN  Referring Physician: SHERRON FAIBRANKS  Performed By: LANCE Harding     BSA: 1.8 m2  Height: 65 in  Weight: 158 lb  BP: 128/91 mmHg  ______________________________________________________________________________  Procedure  Complete  Portable Echo Adult.  ______________________________________________________________________________  Interpretation Summary  Global and regional left ventricular function is normal with an EF of 60-65%.  Global right ventricular function is normal. The right ventricle is normal  size.  No significant valvular abnormalities.  IVC diameter <2.1 cm collapsing >50% with sniff suggests a normal RA pressure  of 3 mmHg.  Ascites is noted.  This study was compared with the study from 04/20/2021 (resting tomograms from  stress study). No significant changes noted.  ______________________________________________________________________________  Left Ventricle  Global and regional left ventricular function is normal with an EF of 60-65%.  Left ventricular wall thickness is normal. Left ventricular size is normal.  Left ventricular diastolic function is indeterminate.     Right Ventricle  Global right ventricular function is normal. The right ventricle is normal  size.     Atria  Both atria appear normal.     Mitral Valve  The mitral valve is normal. Trace mitral insufficiency is present.     Aortic Valve  The aortic valve is tricuspid. On Doppler interrogation, there is no  significant stenosis or regurgitation.     Tricuspid Valve  The tricuspid valve is normal. Trace tricuspid insufficiency is present.  Pulmonary artery systolic pressure cannot be assessed.     Pulmonic Valve  The valve leaflets are not well visualized. Trace pulmonic insufficiency is  present.     Vessels  Sinuses of Valsalva 3.0 cm. Ascending aorta 3.5 cm. IVC diameter <2.1 cm  collapsing >50% with sniff suggests a normal RA pressure of 3 mmHg.     Pericardium  No pericardial effusion is present.     Miscellaneous  Ascites is noted.     Compared to Previous Study  This study was compared with the study from 04/20/2021 (resting tomograms from  stress study) . No significant changes  noted.  ______________________________________________________________________________  MMode/2D Measurements & Calculations  IVSd: 0.79 cm  LVIDd: 4.5 cm  LVIDs: 2.6 cm  LVPWd: 0.79 cm  FS: 41.4 %  LV mass(C)d: 110.9 grams  LV mass(C)dI: 62.0 grams/m2  Ao root diam: 3.0 cm  asc Aorta Diam: 3.5 cm  LVOT diam: 2.3 cm  LVOT area: 4.2 cm2  LA Volume (BP): 55.6 ml     LA Volume Index (BP): 31.1 ml/m2  RWT: 0.35     Doppler Measurements & Calculations  MV E max christiano: 66.0 cm/sec  MV A max christiano: 90.0 cm/sec  MV E/A: 0.73  MV dec slope: 284.0 cm/sec2  MV dec time: 0.23 sec  PA V2 max: 103.0 cm/sec  PA max P.2 mmHg  PA acc time: 0.14 sec  E/E' av.7  Lateral E/e': 6.7  Medial E/e': 10.6     ______________________________________________________________________________  Report approved by: Ho Dunlap 2021 02:08 PM

## 2021-05-06 NOTE — PROGRESS NOTES
[facility]    Medicine Progress Note - Hospitalist Service, Gold 11    Pt is a 55 year old with hx of PSC with cirrhosis, biliary stents in place, ascites, UC on mesalamine, pancreatic mucinous cystadenoma, undergoing liver transplant evaluation who presented to [facility] with septic shock due to S. Mitis bacteremia and SBP. Stable on Zosyn, off pressors since 5/1, BCx NGTD since 5/1. Now admitted to [facility] 5/03 for further care. Transitioned to ceftriaxone for strep mitis bacteremia. Underwent ERCP 5/5 w/ one stent removed, one stent placed and large amounts of stone and pus removed suggestive of cholangitis. Also noted to have possible ~ 2 cm abscess/collection off of the left intrahepatic that could not be accessed. ID consulted and plan for repeat RUQ US to evaluate possible abscess resolution and extended course of abx.       Date of Admission:  5/3/2021  Assessment & Plan      Acute decompensated cirrhosis likely secondary to SBP  Primary sclerosing cholangitis with chronic cirrhosis  Recurrent ascites  Grade III esophageal varices not banded  Ascending cholangitis w/ possible hepatic abscess   Admitted to [facility] 4/29 with septic shock from strep mitis bacteremia and SBP. Paracentesis 4/29 with 1610 WBCs, and 60% neutrophils.  She was stabilized with fluids and abx and transferred to [facility]. Underwent ERCP 5/5 w/ one stent removed, one stent placed and large amounts of stone and pus removed suggestive of cholangitis. Also noted to have possible ~ 2 cm abscess/collection off of the left intrahepatic that could not be accessed.  ID consulted and plan for repeat RUQ US to evaluate possible abscess resolution and determine extended course of abx.  - Hepatology consulted for further transplant workup  - ID consulted thanks for the recs  - Lasix 20 mg daily, spironolactone 50 mg daily.  -Daily MELD labs.  - IF she clinically worsens may need repeat  MRCP w/ repeat  ECP  - Continue Ceftriaxone 2 gQ24 H + Flagyl 500 mg Q8H  - Repeat RUQ EUS - possible IR consult based on findings  - Plan for minimum two weeks IV Ceftriaxone than RUQ US to determine further length for hepatic abscess  - SBP ppx after abx course      Spontaneous bacterial peritonitis  Septic shock, resolved  Strep mitis bacteremia  Require Levophed for short duration at [facility] and now HDS. TTE w/o signs of vegetations on valves. CT dental w/o sign of abscess. Initial plan was for ceftriaxone for 10-14 days to cover the bacteremia but now with her possible abscess ID consulted for assistance. See above for abx plan.  - ID consulted thanks for the recs  - Continue Ceftriaxone 2 gQ24 H + Flagyl 500 mg Q8H     Possible hematochezia  Patient noted some blood in the toilet 2 days prior to transfer but has had none since  -As needed senna and Dulcolax.     GONZALO, resolved  -Likely related to septic shock.    NAGMA  Started on initial presentation to [facility] due to GONZALO. Has remained stable will continue to monitor and workup further if bicarb continues to drop. No diarrhea. Could consider workup for RTA if persistent.  - BMP daily     Ulcerative colitis  -Continue PTA mesalamine.     Severe malnutrition in the context of acute on chronic illness  -Nutrition consult.     Hypertension  -Hold PTA lisinopril restart nadolol given varices     Hyperlipidemia  -Hold PTA statin.       Diet: Snacks/Supplements Adult: Other; Ensure Enlive Shake (chocolate) at HS snack time; Between Meals  2 Gram Sodium Diet    DVT Prophylaxis: Pneumatic Compression Devices  Jurado Catheter: not present  Code Status: Full Code           Disposition Plan   Expected discharge: 4 - 7 days, recommended to prior living arrangement once antibiotic plan established and safe disposition plan/ TCU bed available.  Entered: Ruben Hay DO 05/06/2021, 4:31 PM       The patient's care was discussed with the Bedside Nurse, Patient and  hepatology, panc/bili Consultant.    Ruben Hay, DO  Hospitalist Service, [address]  Contact information available via [facility] Paging/Directory  Please see sign in/sign out for up to date coverage information  ______________________________________________________________________    Interval History     No acute events overnight. Has been feeling quite well today actually. Abdomen more distended. No new fevers or chills.     Four point ROS completed and otherwise negative    Data reviewed today: I reviewed all medications, new labs and imaging results over the last 24 hours. I personally reviewed no images or EKG's today.    Physical Exam   Vital Signs: Temp: 97.8  F (36.6  C) Temp src: Oral BP: 127/78 Pulse: 68   Resp: 16 SpO2: 100 % O2 Device: None (Room air)    Weight: 155 lbs 0 oz    Gen: awake and alert, appears comfortable, appears stated age  HEENT: NCAT, scleral icterus present  CV: regular rate with regular rhythm, S1/S2, extremities warm and well perfused, trace bilateral lower extremity 1+ edema  Pulm: normal work of breathing, lungs clear to auscultation, no crackles or wheezing  GI: Nontender, abdomen is more distended but soft and not painful  Skin: warm, diffusely jaundiced  Neuro: Aox3, speech normal, moves all extremities symmetrically and equally, no asterixis or clonus  Psych: mood is good, affect is congruent    Data   Recent Labs   Lab 05/06/21  0544 05/05/21  0605 05/04/21  0739   WBC 14.8* 20.4* 17.6*   HGB 10.3* 10.2* 10.7*   MCV 94 93 94    200 203   INR 1.46* 1.50* 1.54*    138 139   POTASSIUM 3.6 3.6 3.4   CHLORIDE 107 111* 112*   CO2 18* 17* 18*   BUN 24 19 22   CR 0.61 0.53 0.74   ANIONGAP 11 10 9   MARIELENA 9.0 8.8 9.2   * 100* 82   ALBUMIN 2.1* 1.9* 2.1*   PROTTOTAL 6.4* 6.0* 6.2*   BILITOTAL 1.9* 2.0* 2.6*   ALKPHOS 758* 828* 737*   ALT 89* 110* 126*   AST 69* 122* 153*     No results found for this or any previous  visit (from the past 24 hour(s)).  Medications     - MEDICATION INSTRUCTIONS -         [START ON 5/7/2021] carvedilol  6.25 mg Oral BID w/meals     cefTRIAXone  2 g Intravenous Q24H     furosemide  20 mg Oral Daily     mesalamine  2,400 mg Oral Daily with breakfast     metroNIDAZOLE  500 mg Oral TID     multivitamin w/minerals  1 tablet Oral Daily     potassium chloride ER  10 mEq Oral At Bedtime     simvastatin  20 mg Oral At Bedtime     sodium chloride (PF)  3 mL Intracatheter Q8H     spironolactone  50 mg Oral Daily     ursodiol  300 mg Oral BID

## 2021-05-06 NOTE — PLAN OF CARE
Occupational Therapy Discharge Summary    Reason for therapy discharge:    All goals and outcomes met, no further needs identified.    Progress towards therapy goal(s). See goals on Care Plan in [facility] electronic health record for goal details.  Goals met    Therapy recommendation(s):    Continue home exercise program.

## 2021-05-06 NOTE — PROGRESS NOTES
"[facility]  HEPATOLOGY PROGRESS NOTE  Berta Nava [id number]       CC: fevers, lethargy  SUBJECTIVE:  Improvement in lethargy. Denies fevers, abdominal pain, nausea or vomiting. Continues to feel constipated and has not had a BM for a few days. She has tried to stay hydrated and walking when able, on stool softener and had suppository without success.      ROS:  A 10-point review of systems was negative.    OBJECTIVE:  VS: /77 (BP Location: Left arm)   Pulse 62   Temp 97.9  F (36.6  C) (Oral)   Resp 16   Ht 1.651 m (5' 5\")   Wt 70.3 kg (155 lb)   SpO2 96%   BMI 25.79 kg/m       Gross per 24 hour   Intake 650 ml   Output --   Net 650 ml     General: In no acute distress, mild facial muscle wasting  Neuro: AOx3, No asterixis  Lymph: No cervical lymphadenopathy  HEENT:  Noscleral icterus, Nooral lesions  CV: S1/[id number] murmurs. Skin warm and dry  Lungs: clear to auscultation, diminished bases Respirations even and nonlabored on room air  Abd:  Mildly distended, nontender    Extrem: Noperipheral edema   Skin: mild jaundice  Psych:affect and mood normal    MEDICATIONS:  Current Facility-Administered Medications   Medication     bisacodyl (DULCOLAX) Suppository 10 mg     [START ON 5/7/2021] carvedilol (COREG) tablet 6.25 mg     cefTRIAXone (ROCEPHIN) 2 g vial to attach to  ml bag for ADULTS or NS 50 ml bag for PEDS     furosemide (LASIX) tablet 20 mg     lidocaine (LMX4) cream     lidocaine 1 % 0.1-1 mL     May continue current IV fluids if patient has IV fluids infusing.     melatonin tablet 3 mg     mesalamine (LIALDA) EC tablet 2,400 mg     multivitamin w/minerals (THERA-VIT-M) tablet 1 tablet     ondansetron (ZOFRAN-ODT) ODT tab 4 mg    Or     ondansetron (ZOFRAN) injection 4 mg     polyethylene glycol (MIRALAX) Packet 17 g     potassium chloride ER (MICRO-K) CR capsule 10 mEq     senna-docusate (SENOKOT-S/PERICOLACE) 8.6-50 MG per tablet 2 tablet     simvastatin (ZOCOR) tablet 20 mg     " sodium chloride (PF) 0.9% PF flush 3 mL     sodium chloride (PF) 0.9% PF flush 3 mL     sodium chloride (PF) 0.9% PF flush 3 mL     spironolactone (ALDACTONE) tablet 50 mg     ursodiol (ACTIGALL) capsule 300 mg       REVIEW OF LABORATORY, PATHOLOGY AND IMAGING RESULTS:  BMP  Recent Labs   Lab 05/06/21 0544 05/05/21 0605 05/04/21 0739 05/01/21  0420    138 139 140   POTASSIUM 3.6 3.6 3.4 3.8   CHLORIDE 107 111* 112* 115*   MARIELENA 9.0 8.8 9.2 9.0   CO2 18* 17* 18* 18*   BUN 24 19 22 44*   CR 0.61 0.53 0.74 0.89   * 100* 82 126*     CBC  Recent Labs   Lab 05/06/21 0544 05/05/21 0605 05/04/21 0739 05/02/21  1241   WBC 14.8* 20.4* 17.6* 25.0*   RBC 3.38* 3.38* 3.55* 3.76*   HGB 10.3* 10.2* 10.7* 11.5*   HCT 31.6* 31.3* 33.3* 35.8   MCV 94 93 94 95   MCH 30.5 30.2 30.1 30.6   MCHC 32.6 32.6 32.1 32.1   RDW 16.5* 16.3* 16.2* 16.0*    200 203 320     INR  Recent Labs   Lab 05/06/21 0544 05/05/21 0605 05/04/21 0739 05/01/21  0420   INR 1.46* 1.50* 1.54* 1.60*     LFTs  Recent Labs   Lab 05/06/21 0544 05/05/21  0605 05/04/21 0739 05/03/21  1326   ALKPHOS 758* 828* 737* 719*   AST 69* 122* 153* 212*   ALT 89* 110* 126* 150*   BILITOTAL 1.9* 2.0* 2.6* 1.9*   PROTTOTAL 6.4* 6.0* 6.2* 6.7*   ALBUMIN 2.1* 1.9* 2.1* 2.5*  2.4*      PANCNo lab results found in last 7 days.   MELD-Na score: 14 at 5/6/2021  5:44 AM  MELD score: 13 at 5/6/2021  5:44 AM  Calculated from:  Serum Creatinine: 0.61 mg/dL (Rounded to 1 mg/dL) at 5/6/2021  5:44 AM  Serum Sodium: 136 mmol/L at 5/6/2021  5:44 AM  Total Bilirubin: 1.9 mg/dL at 5/6/2021  5:44 AM  INR(ratio): 1.46 at 5/6/2021  5:44 AM  Age: 55 years 8 months      Imaging Results:  ERCP 5/5/21  Impression:          - One partially occluded stent from the biliary tree was                        seen in the major papilla. Removed.                        - Cholangiogram showed diffusely irregular biliary tree                        with paucity/loss of multiple tertiary  branches                        consistent with PSC/cirrhosis. Multiple filling defects                        throught the biliary tree consistent with stones/sludge                        particularly in the left intrahepatic. There appeared to                        be a ~2 cm collection/abscess off of the left                        intrahepatic. Attempts to gain wire access to this                        collection were unsuccessful.                        - Cystic duct/gallbladder opacified                        - Stone clearance was achieved with balloon dilation and                        balloon/basket sweeping. Large amount of stone,                        sludge/cast-like material with associated pus removed                        - One 10 Fr x 20 cm Johlin stent placed into the left                        intrahepatic biliary tree                        - Grade III esophageal varices. Not banded at this time                        due to concern that early repeat ERCP intervention could                        be needed for recurrent cholangitis     CT dental 5/5/21  IMPRESSION:  No periapical abscess. Large dental caries of the left maxillary  second premolar.    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 She was started on Zosyn and transitioned to ceftriaxone.     RECOMMENDATIONS:  1. Strep mitis bacteremia with sepsis  - strep mitis + on 4/29, follow up negative 5/1, 5/3. pansensitive  - has been on zosyn since 4/29-5/3. Ceftriaxone started 5/4  - more stable VS, did require pressor support at OSH  -  echocardiogram negative for vegetations  -  Dental CT with caries, advised she needs to visit with dentist following discharge. No abscess.       2. SBP  3. Ascites  - initial para on 4/29 with 1094 PMN's, culture negative. Received 75 gm Albumin on 4/29, 50 gms on 5/3.   - now on ceftriaxone Will need SBP ppx with ciprofloxacin 500 mg daily when abx completed.   - para as needed- has outpatient scheduled next week. Will need para prior to discharge here.   - has been on low dose diuretics with lasix 20 mg daily and spironolactone 50 mg daily     4. PSC  5. Biliary strictures/stones  6. Hepatic abscess  - will need mammogram as outpatient to complete tx eval.   - ERCP 5/5 with paritally occluded stent, duct swept with casts and stones. Abscess not accessed, may be communicating with bile ducts. Infectious disease consulted. Plan for repeat ERCP in 1 month.   - has been ursodiol 300 mg BID  - in transplant evaluation, MELD 14, ABO AB  - US 5/4 without evidence of HCC     7. Esophageal varices  - EGD 12/1/20 with gr II-III EV. Not banded at that time. Has been on nadolol. Will change to carvedilol 6.25 BID (done)  - large gr III EV seen on EGD, not banded.   - no current signs of bleeding.      8. Immunizations  - please start HBV/HAV vaccination prior to discharge.     Patient was discussed with attending physician, Dr. Emma Mao    Next follow up appointment: Dr. Leventhal 6/21    Thank you for the opportunity to be involved with  Berta GRIFFITHS [facility]. Please call with any questions or concerns.    Andria Rowe, APRN, CNP  Inpatient Hepatology [place]  Text Link

## 2021-05-06 NOTE — PROGRESS NOTES
Therapy:IV ABX  Insurance:[employer]   Patient Berta Nava has coverage for iv abx through her [employer] plan, she is covered 100% since her $4000 ded and $6000 oop have been met in full.    In reference to admission date 05/03/2021 [facility] 7A to check iv abx coverage    Please contact Intake with any questions, [phone] or In Basket [place], [facility] ([phone]).

## 2021-05-06 NOTE — PLAN OF CARE
"/68 (BP Location: Left arm)   Pulse 85   Temp 97.6  F (36.4  C) (Oral)   Resp 16   Ht 1.651 m (5' 5\")   Wt 70.3 kg (155 lb)   SpO2 100%   BMI 25.79 kg/m      Shift: 7652-1808  VS: occasionally hypotensive, and bradycardic while sleeping, but other vitals WDL on RA, afebrile  Neuro: Aox4, drowsy  Behaviors: Pleasant, cooperative, able to make  her needs known  BG: NA  Respiratory: Clear lung sounds, no shortness of breath.  Capnography on.  EtCO2 22, IPI 8  Cardiac: Bradycardic, with heart rates in 50s while sleeping.  Pain/Nausea/PRN: NA  Diet: 2g Na diet.  Small appetite  LDA: R wrist PIV.  LFA PIV, painful on flushing  Infusion(s): Ceftriaxone daily  GI/: Constipation.  Suppository administered.  No BM.  Abdominal fullness.  Skin: Intact  Mobility: Independent  Plan: Continue IV antibiotics, watch LFTs.  ID consult in AM    "

## 2021-05-07 ENCOUNTER — APPOINTMENT (OUTPATIENT)
Dept: ULTRASOUND IMAGING | Facility: CLINIC | Age: 56
DRG: 444 | End: 2021-05-07
Attending: STUDENT IN AN ORGANIZED HEALTH CARE EDUCATION/TRAINING PROGRAM
Payer: COMMERCIAL

## 2021-05-07 ENCOUNTER — HOME INFUSION (PRE-WILLOW HOME INFUSION) (OUTPATIENT)
Dept: PHARMACY | Facility: CLINIC | Age: 56
End: 2021-05-07

## 2021-05-07 LAB
ALBUMIN SERPL-MCNC: 2 G/DL (ref 3.4–5)
ALP SERPL-CCNC: 671 U/L (ref 40–150)
ALT SERPL W P-5'-P-CCNC: 90 U/L (ref 0–50)
ANION GAP SERPL CALCULATED.3IONS-SCNC: 8 MMOL/L (ref 3–14)
APPEARANCE FLD: CLEAR
AST SERPL W P-5'-P-CCNC: 93 U/L (ref 0–45)
BACTERIA SPEC CULT: NO GROWTH
BASOPHILS # BLD AUTO: 0 10E9/L (ref 0–0.2)
BASOPHILS NFR BLD AUTO: 0.1 %
BILIRUB SERPL-MCNC: 1.5 MG/DL (ref 0.2–1.3)
BUN SERPL-MCNC: 19 MG/DL (ref 7–30)
CALCIUM SERPL-MCNC: 8.7 MG/DL (ref 8.5–10.1)
CHLORIDE SERPL-SCNC: 112 MMOL/L (ref 94–109)
CO2 SERPL-SCNC: 20 MMOL/L (ref 20–32)
COLOR FLD: YELLOW
CREAT SERPL-MCNC: 0.57 MG/DL (ref 0.52–1.04)
DIFFERENTIAL METHOD BLD: ABNORMAL
EOSINOPHIL # BLD AUTO: 0.3 10E9/L (ref 0–0.7)
EOSINOPHIL NFR BLD AUTO: 1.8 %
ERYTHROCYTE [DISTWIDTH] IN BLOOD BY AUTOMATED COUNT: 16.7 % (ref 10–15)
GFR SERPL CREATININE-BSD FRML MDRD: >90 ML/MIN/{1.73_M2}
GLUCOSE SERPL-MCNC: 85 MG/DL (ref 70–99)
HCT VFR BLD AUTO: 30.6 % (ref 35–47)
HGB BLD-MCNC: 10 G/DL (ref 11.7–15.7)
IMM GRANULOCYTES # BLD: 0.2 10E9/L (ref 0–0.4)
IMM GRANULOCYTES NFR BLD: 1 %
INR PPP: 1.36 (ref 0.86–1.14)
LYMPHOCYTES # BLD AUTO: 1.7 10E9/L (ref 0.8–5.3)
LYMPHOCYTES NFR BLD AUTO: 10.2 %
LYMPHOCYTES NFR FLD MANUAL: 28 %
MCH RBC QN AUTO: 30.3 PG (ref 26.5–33)
MCHC RBC AUTO-ENTMCNC: 32.7 G/DL (ref 31.5–36.5)
MCV RBC AUTO: 93 FL (ref 78–100)
MONOCYTES # BLD AUTO: 1.1 10E9/L (ref 0–1.3)
MONOCYTES NFR BLD AUTO: 7 %
NEUTROPHILS # BLD AUTO: 13.1 10E9/L (ref 1.6–8.3)
NEUTROPHILS NFR BLD AUTO: 79.9 %
NEUTS BAND NFR FLD MANUAL: 35 %
NRBC # BLD AUTO: 0 10*3/UL
NRBC BLD AUTO-RTO: 0 /100
OTHER CELLS FLD MANUAL: 37 %
PLATELET # BLD AUTO: 207 10E9/L (ref 150–450)
POTASSIUM SERPL-SCNC: 3.8 MMOL/L (ref 3.4–5.3)
PROT SERPL-MCNC: 6 G/DL (ref 6.8–8.8)
RBC # BLD AUTO: 3.3 10E12/L (ref 3.8–5.2)
SODIUM SERPL-SCNC: 139 MMOL/L (ref 133–144)
SPECIMEN SOURCE FLD: NORMAL
SPECIMEN SOURCE: NORMAL
WBC # BLD AUTO: 16.4 10E9/L (ref 4–11)
WBC # FLD AUTO: 180 /UL

## 2021-05-07 PROCEDURE — 49083 ABD PARACENTESIS W/IMAGING: CPT | Performed by: STUDENT IN AN ORGANIZED HEALTH CARE EDUCATION/TRAINING PROGRAM

## 2021-05-07 PROCEDURE — 85610 PROTHROMBIN TIME: CPT | Performed by: INTERNAL MEDICINE

## 2021-05-07 PROCEDURE — 250N000013 HC RX MED GY IP 250 OP 250 PS 637: Performed by: INTERNAL MEDICINE

## 2021-05-07 PROCEDURE — 76705 ECHO EXAM OF ABDOMEN: CPT | Mod: 26 | Performed by: RADIOLOGY

## 2021-05-07 PROCEDURE — 250N000012 HC RX MED GY IP 250 OP 636 PS 637: Performed by: STUDENT IN AN ORGANIZED HEALTH CARE EDUCATION/TRAINING PROGRAM

## 2021-05-07 PROCEDURE — 87075 CULTR BACTERIA EXCEPT BLOOD: CPT | Performed by: STUDENT IN AN ORGANIZED HEALTH CARE EDUCATION/TRAINING PROGRAM

## 2021-05-07 PROCEDURE — 80053 COMPREHEN METABOLIC PANEL: CPT | Performed by: INTERNAL MEDICINE

## 2021-05-07 PROCEDURE — 120N000011 HC R&B TRANSPLANT UMMC

## 2021-05-07 PROCEDURE — 87070 CULTURE OTHR SPECIMN AEROBIC: CPT | Performed by: STUDENT IN AN ORGANIZED HEALTH CARE EDUCATION/TRAINING PROGRAM

## 2021-05-07 PROCEDURE — 87798 DETECT AGENT NOS DNA AMP: CPT | Performed by: INTERNAL MEDICINE

## 2021-05-07 PROCEDURE — 85025 COMPLETE CBC W/AUTO DIFF WBC: CPT | Performed by: INTERNAL MEDICINE

## 2021-05-07 PROCEDURE — 76705 ECHO EXAM OF ABDOMEN: CPT

## 2021-05-07 PROCEDURE — 250N000011 HC RX IP 250 OP 636: Performed by: INTERNAL MEDICINE

## 2021-05-07 PROCEDURE — 99233 SBSQ HOSP IP/OBS HIGH 50: CPT | Mod: GC | Performed by: INTERNAL MEDICINE

## 2021-05-07 PROCEDURE — 250N000013 HC RX MED GY IP 250 OP 250 PS 637: Performed by: STUDENT IN AN ORGANIZED HEALTH CARE EDUCATION/TRAINING PROGRAM

## 2021-05-07 PROCEDURE — 99233 SBSQ HOSP IP/OBS HIGH 50: CPT | Performed by: NURSE PRACTITIONER

## 2021-05-07 PROCEDURE — 99233 SBSQ HOSP IP/OBS HIGH 50: CPT | Mod: 25 | Performed by: STUDENT IN AN ORGANIZED HEALTH CARE EDUCATION/TRAINING PROGRAM

## 2021-05-07 PROCEDURE — 250N000013 HC RX MED GY IP 250 OP 250 PS 637: Performed by: NURSE PRACTITIONER

## 2021-05-07 PROCEDURE — 0W9G3ZZ DRAINAGE OF PERITONEAL CAVITY, PERCUTANEOUS APPROACH: ICD-10-PCS | Performed by: STUDENT IN AN ORGANIZED HEALTH CARE EDUCATION/TRAINING PROGRAM

## 2021-05-07 PROCEDURE — 89051 BODY FLUID CELL COUNT: CPT | Performed by: STUDENT IN AN ORGANIZED HEALTH CARE EDUCATION/TRAINING PROGRAM

## 2021-05-07 PROCEDURE — 36415 COLL VENOUS BLD VENIPUNCTURE: CPT | Performed by: INTERNAL MEDICINE

## 2021-05-07 RX ORDER — HEPARIN SODIUM,PORCINE 10 UNIT/ML
2-5 VIAL (ML) INTRAVENOUS
Status: ACTIVE | OUTPATIENT
Start: 2021-05-07 | End: 2021-05-10

## 2021-05-07 RX ORDER — METHYLPREDNISOLONE 32 MG/1
32 TABLET ORAL
Status: COMPLETED | OUTPATIENT
Start: 2021-05-07 | End: 2021-05-07

## 2021-05-07 RX ORDER — METHYLPREDNISOLONE 32 MG/1
32 TABLET ORAL
Status: COMPLETED | OUTPATIENT
Start: 2021-05-08 | End: 2021-05-08

## 2021-05-07 RX ORDER — LIDOCAINE 40 MG/G
CREAM TOPICAL
Status: ACTIVE | OUTPATIENT
Start: 2021-05-07 | End: 2021-05-10

## 2021-05-07 RX ADMIN — METRONIDAZOLE 500 MG: 500 TABLET ORAL at 20:22

## 2021-05-07 RX ADMIN — FUROSEMIDE 20 MG: 20 TABLET ORAL at 10:12

## 2021-05-07 RX ADMIN — SPIRONOLACTONE 50 MG: 50 TABLET, FILM COATED ORAL at 10:12

## 2021-05-07 RX ADMIN — SIMVASTATIN 20 MG: 5 TABLET, FILM COATED ORAL at 22:31

## 2021-05-07 RX ADMIN — MESALAMINE 2400 MG: 1.2 TABLET, DELAYED RELEASE ORAL at 10:10

## 2021-05-07 RX ADMIN — CEFTRIAXONE 2 G: 2 INJECTION, POWDER, FOR SOLUTION INTRAMUSCULAR; INTRAVENOUS at 15:02

## 2021-05-07 RX ADMIN — METRONIDAZOLE 500 MG: 500 TABLET ORAL at 01:00

## 2021-05-07 RX ADMIN — URSODIOL 300 MG: 300 CAPSULE ORAL at 10:09

## 2021-05-07 RX ADMIN — CARVEDILOL 6.25 MG: 6.25 TABLET, FILM COATED ORAL at 17:56

## 2021-05-07 RX ADMIN — METHYLPREDNISOLONE 32 MG: 32 TABLET ORAL at 20:23

## 2021-05-07 RX ADMIN — Medication 1 TABLET: at 10:11

## 2021-05-07 RX ADMIN — POTASSIUM CHLORIDE 10 MEQ: 750 CAPSULE, EXTENDED RELEASE ORAL at 22:31

## 2021-05-07 RX ADMIN — CARVEDILOL 6.25 MG: 6.25 TABLET, FILM COATED ORAL at 10:11

## 2021-05-07 RX ADMIN — METRONIDAZOLE 500 MG: 500 TABLET ORAL at 10:12

## 2021-05-07 RX ADMIN — DOCUSATE SODIUM 50 MG AND SENNOSIDES 8.6 MG 2 TABLET: 8.6; 5 TABLET, FILM COATED ORAL at 17:56

## 2021-05-07 RX ADMIN — URSODIOL 300 MG: 300 CAPSULE ORAL at 20:22

## 2021-05-07 RX ADMIN — METRONIDAZOLE 500 MG: 500 TABLET ORAL at 15:03

## 2021-05-07 ASSESSMENT — ACTIVITIES OF DAILY LIVING (ADL)
ADLS_ACUITY_SCORE: 13
ADLS_ACUITY_SCORE: 13
ADLS_ACUITY_SCORE: 14
ADLS_ACUITY_SCORE: 13
ADLS_ACUITY_SCORE: 14
ADLS_ACUITY_SCORE: 13

## 2021-05-07 NOTE — PLAN OF CARE
VS: stable, on room air.   BG: none  Labs: pending  Pain/Nausea: Denies  Diet: NPO after MN  LDA: PIV X 2 saline locked  GI: passing gas. Last BM 5/2  : Voiding, not saving  Skin: no issues.    Mobility: Up ad indy  Plan: Abdominal US

## 2021-05-07 NOTE — PROGRESS NOTES
[facility] Progress Note       ATTESTATION:    I, Gloria Morocho, saw and evaluated Mrs. Berta Nava with Dr. Susana Wisdom (IM resident).  I have reviewed and discussed with Dr. Susana Wisdom the patient's history, physical and plan.     I personally reviewed the vital signs, medications, labs, and imaging.     Dr. Susana Wisdom's excellent note with physical exam, assessment/plan, discussion and recommendations reflect our joint thought process, differential diagnosis and recommended work up. Please see Dr. Susana Wisdom's note for detailed recommendations.       Gloria Morocho MD  05/07/21 2010        Patient:  Berta Nava   YOB: 1965, MRN: [id number]  Date of Visit: 05/07/2021  Date of Admission: 5/3/2021  Consult Requester: Ruben Hay DO ID Problem List:  1. Strep mitis bacteremia   2. 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 Ascending cholangitis with hepatic abscess   4. SBP    Assessment and Discussion:  Repeat abdominal US from today (5/7/21) suggests mild improvement in R sided hepatic collections following ERCP on 5/5, however the US still showed right hepatic lobe collection of 4x4.5x4.1 cm . The 2cm L hepatic collection seen on ERCP is not well visualized. Per discussion with Primary Team it may be that this is contiguous with the biliary system but GI was unable to definitively determine. Given the slow resolution of this abscess we strongly recommend discussion with IR whether additional drainage would be recommended. Getting adequate source control of these abscesses will affect ultimate duration of antibiotics and possibly resolution of collections. Per GI repeat ERCP to be done in 4 weeks.      Most hepatic abscesses are polymicrobial, and streptococcus mitis can colonize the GI tract. Agree with choice of Ceftriaxone which should broadly cover the Strep mitis and any GNRs that may be present. Would  add Metronidazole for augmented anaerobic coverage. She will need to complete 2 weeks of IV CTX for her Strep mitis bacteremia. This should also cover duration needed for SBP. Hepatic abscesses are generally treated anywhere from 2-4 weeks, however it depends on source control and follow up images.     Recommendations:    1. Please continue Metronidazole 500mg Q8H and Ceftriaxone 2g Q24H. On 5/15, the patient will complete her treatment for Streptococcus mitis bacteremia, however the antibiotics will need to be continued further for treatment of the liver collection. Duration will depend on resolution of liver abscess. We strongly recommend IR evaluation for abscess drainage. If that happens, antibiotics will need to be continued for at least 2 weeks from the drainage and a follow up US will need scheduled for 2 weeks after the drainage to determine if antibiotics will need to be prolonged. If IR determines that drainage is not possible, patient will need to have antibiotics continued and a new US abdomen obtained on 5/18/21 to further determine if antibiotics will need to be prolonged. I will place a tentative end date for 5/26/21 at this moment (in order to give enough time for the US to be interpreted and for decision to be made regarding prolongation of antibiotics)    2. Ideally the patient should stay in hospital through the weekend to make sure the drainage is performed and that antibiotic plan can be finalized    3. If decision is made to dismiss the patient over the weekend (not ideal in my opinion), PICC will need to be placed    4. I will send a message to our clinic and schedule a follow up in 2 weeks     5. While on antibiotics, please obtain at least weekly CBC,  CMP and CRP (daily CBC and BMP while in hospital).     6. ID team will continue to follow      This patient was discussed with the attending physician , who agrees with the above assessment and plan    Thank you for the consult. ID will  "continue to follow with you.     Susana Wisdom MD   PGY2 Internal Medicine   05/07/2021    [facility] ([place]) will continue to follow this patient. Dr Irving (Pager: [phone]) will cross cover the [facility] team over the weekend. I  will take over the [place] service on Monday (5/10/21).            Interval History and Events:   NAEO. This morning Ms Nava tells me she is feeling well overall. She actually had a good night of sleep which she was pleased with. She denies fevers, chills, malaise. She has some abdominal distention but denies abdominal pain. She denies significant urinary or respiratory sx. Over all she has no new concerns today.          Antimicrobial Treatment:   Zosyn 4/29 - 5/4  Ceftriaxone 5/4 - present   Metronidazole 5/6 - present          Review of Systems:   Targeted 4 point ROS was completed with pertinent positives and negatives are detailed above.         HPI:   Adopted from initial consult note on 5/6:    \"She states that for 1-2 weeks prior to her admission she did not feel well. She describes fatigue, generalized malaise. Approximately 3-4 days prior to admission she started to develop fevers at night. As high as 101 at home. She was getting chills and sweats occasionally to the point where he  mentioned that she did not look well. She denies any clear localizing symptoms at that time such as SOB, cough, CP, significant abdominal pain, n/v/d/c, dysuria. Her abdomen is fairly distended from her ascites and this causes her some discomfort. She has recently started to get paracenteses recently on 4/13 (5L removal). She went to an appointment for a scheduled paracentesis on 4/28 and was noted to be significantly hypotensive (SBPs reported to be as low as 60s) and so was sent to the ED. At time of presentation she had labs notable for WBC 43, Procal 5.41, Cr 2.28 (from normal bl), LFTs above baseline. She had a paracentesis done with WBC 1610 68% PMNS, cultures remain " "negative. Blood cultures positive for Streptococcus mitis. She was admitted to the ICU for septic shock and was started on Zosyn. CT abdomen at that time was not particularly revealing. She improved over the next few days and pressors were weaned off. She was ultimately transferred to [facility] for continued management. Pt remained vitally stable here however alk phos had significantly uptrended from 472 to 719. On 5/3 she underwent repeat paracentesis  31% PMN. She underwent RUQ US on 5/4 which revealed, \"septated cystic collection in the right hepatic lobe demonstrated measuring up to 5.2 cm. This appears to have increased in size from MRI where there was a severely dilated duct in this region measuring up to 2.7 cm and correlates with a hypoattenuating area on CT. Given cholangitis history, this is suspicious for an intrahepatic abscess. Additional collections have also increased in size/are new from MRI (where there were severely dilated ducts and debris) and are noted on CT.\" She was transitioned to Ceftriaxone 5/4 and has remained afebrile and vitally stable. She underwent ERCP on 5/5 and they removed an occluded stent, a large amount of pus was evacuated, and a small 2cm collection off the L intrahepatic duct was identified but was not amenable to endoscopic intervention.\"         Physical Examination:   Temp:  [97.4  F (36.3  C)-98.8  F (37.1  C)] 97.4  F (36.3  C)  Pulse:  [64-69] 69  Resp:  [16-20] 18  BP: (115-127)/(69-78) 119/73  SpO2:  [98 %-100 %] 98 %    I/O last 3 completed shifts:  In: 720 [P.O.:720]  Out: -     Vitals:    05/03/21 1842 05/05/21 1247   Weight: 71.9 kg (158 lb 8 oz) 70.3 kg (155 lb)     Constitutional: well appearing woman in NAD. Awake, alert, interactive. Sitting up comfortably in bed.  Respiratory: No increased work of breathing, CTAB, no crackles or wheezing.  Cardiovascular: RRR, no murmur noted. No peripheral edema.  GI: Hyperactive bowel sounds, abdomen is distended but is " relatively soft and nontender to palpation. There is no rigidity or rebound ttp  Skin: Warm, dry, well-perfused. No bruising, bleeding, rashes, or lesions on limited exam.  Musculoskeletal: Extremities grossly normal, non-tender, no edema.   Neurologic: A&O. Answers questions appropriately, speech normal. Moves all extremities spontaneously.  Neuropsychiatric: Calm. Affect appropriate to situation.         Medications:       carvedilol  6.25 mg Oral BID w/meals     cefTRIAXone  2 g Intravenous Q24H     furosemide  20 mg Oral Daily     mesalamine  2,400 mg Oral Daily with breakfast     metroNIDAZOLE  500 mg Oral TID     multivitamin w/minerals  1 tablet Oral Daily     potassium chloride ER  10 mEq Oral At Bedtime     simvastatin  20 mg Oral At Bedtime     sodium chloride (PF)  3 mL Intracatheter Q8H     spironolactone  50 mg Oral Daily     ursodiol  300 mg Oral BID       Antiinfectives:  Anti-infectives (From now, onward)    Start     Dose/Rate Route Frequency Ordered Stop    05/06/21 1700  metroNIDAZOLE (FLAGYL) tablet 500 mg      500 mg Oral 3 TIMES DAILY 05/06/21 1628      05/04/21 1330  cefTRIAXone (ROCEPHIN) 2 g vial to attach to  ml bag for ADULTS or NS 50 ml bag for PEDS      2 g  over 30 Minutes Intravenous EVERY 24 HOURS 05/04/21 1318            Infusions/Drips:    - MEDICATION INSTRUCTIONS -            Laboratory Data:   Microbiology:  Culture Micro   Date Value Ref Range Status   05/03/2021 No growth after 4 days  Preliminary   05/03/2021 No growth after 4 days  Preliminary   05/03/2021 Culture negative monitoring continues  Preliminary   05/01/2021 No growth  Final   04/29/2021 No growth  Final   04/29/2021 (A)  Final    Cultured on the 1st day of incubation:  Streptococcus mitis     04/29/2021   Final    Critical Value/Significant Value, preliminary result only, called to and read back by  Sue Gutierrez RN on 4.30.2021 at 0942. KVO     04/29/2021 Susceptibility testing done on previous specimen   Final   04/29/2021 No growth  Final   04/29/2021 (A)  Final    Cultured on the 1st day of incubation:  Streptococcus mitis     04/29/2021   Final    Critical Value/Significant Value, preliminary result only, called to and read back by  Shahbaz Hill RN @ 0410 4/30/21 TM.     04/29/2021   Final    (Note)  POSITIVE for STREPTOCOCCUS SPECIES OTHER THAN pneumococcus, anginosus  group, S. pyogenes and S. agalactiae. Performed using [employer] nucleic acid test. Final identification and antimicrobial  susceptibility testing will be verified by standard methods.    Specimen tested with [employer] multiplex, gram-positive blood culture  nucleic acid test for the following targets: Staph aureus, Staph  epidermidis, Staph lugdunensis, other Staph species, Enterococcus  faecalis, Enterococcus faecium, Streptococcus species, S. agalactiae,  S. anginosus grp., S. pneumoniae, S. pyogenes, Listeria sp., mecA  (methicillin resistance) and Fanny/B (vancomycin resistance).    Critical Value/Significant Value called to and read back by  Yousuf Traylor RN 4/30/21 @ 0649 TF         Inflammatory Markers    No lab results found.    Metabolic Studies     Recent Labs   Lab Test 05/07/21  0543 05/06/21  0544 05/05/21  0605 04/30/21  0455 04/30/21  0455 04/29/21  1840 05/16/18  0650 05/16/18  0650 05/14/18  0653 05/14/18  0653    136 138   < > 129*  --    < > 138   < > 135   POTASSIUM 3.8 3.6 3.6   < > 4.7  --    < > 3.8   < > 3.1*   CHLORIDE 112* 107 111*   < > 102  --    < > 109   < > 102   CO2 20 18* 17*   < > 17*  --    < > 19*   < > 23   ANIONGAP 8 11 10   < > 10  --    < > 10   < > 10   BUN 19 24 19   < > 73*  --    < > 7   < > 10   CR 0.57 0.61 0.53   < > 1.69*  --    < > 0.60   < > 0.83   GFRESTIMATED >90 >90 >90   < > 33*  --    < > >90   < > 71   GLC 85 183* 100*   < > 196*  --    < > 73   < > 80   A1C  --   --   --   --   --  4.3  --   --   --   --    MARIELENA 8.7 9.0 8.8   < > 9.3  --    < > 8.4*   < > 8.0*   PHOS  --   --    --   --   --   --   --  2.6  --   --    MAG  --   --   --   --   --   --   --   --   --  2.7*   LACT  --   --   --   --  1.1  --    < >  --   --   --     < > = values in this interval not displayed.       Hepatic Studies    Recent Labs   Lab Test 05/07/21  0543 05/06/21  0544 05/05/21  0605   BILITOTAL 1.5* 1.9* 2.0*   ALKPHOS 671* 758* 828*   ALBUMIN 2.0* 2.1* 1.9*   AST 93* 69* 122*   ALT 90* 89* 110*       Pancreatitis testing    Recent Labs   Lab Test 05/13/18  0935   LIPASE 142       Hematology Studies      Recent Labs   Lab Test 05/07/21  0543 05/06/21  0544 05/05/21  0605   WBC 16.4* 14.8* 20.4*   ANEU 13.1* 13.4* 16.8*   ALYM 1.7 0.7* 1.6   BRIGIDO 1.1 0.5 0.9   AEOS 0.3 0.0 0.6   HGB 10.0* 10.3* 10.2*   HCT 30.6* 31.6* 31.3*    168 200     Urine Studies     Recent Labs   Lab Test 04/29/21  1106   URINEPH 5.5   NITRITE Negative   LEUKEST Negative   WBCU 15*     Last check of C difficile  C Diff Toxin B PCR   Date Value Ref Range Status   05/16/2018 Negative NEG^Negative Final     Comment:     Negative: Clostridium difficile target DNA sequences NOT detected, presumed   negative for Clostridium difficile toxin B or the number of bacteria present   may be below the limit of detection for the test.  FDA approved assay performed using [employer] GeneXpert real-time PCR.  A negative result does not exclude actual disease due to Clostridium difficile   and may be due to improper collection, handling and storage of the specimen   or the number of organisms in the specimen is below the detection limit of the   assay.              Imaging:     Results for orders placed or performed during the hospital encounter of 05/03/21   US Abdomen Limited    Narrative    EXAMINATION: Limited Abdominal Ultrasound, 5/4/2021 8:49 AM     COMPARISON: CT abdomen and pelvis without contrast 4/29/2021.  Ultrasound abdomen 4/29/2021 and 3/23/2021. MRI abdomen 4/22/2021.    HISTORY: Worsening liver function tests in patient with  decompensated  cirrhosis, history of cholangitis and biliary stent.    FINDINGS:   Fluid: Small volume of ascites.    Liver: The liver demonstrates markedly coarsened heterogeneous  echotexture, measuring 16.8 cm in craniocaudal dimension. Distorted  anatomy secondary to underlying chronic liver disease. Perihepatic  ascites present. There is a septated cystic-appearing area in the  posterior right hepatic lobe measuring 5.2 x 3.7 x 3.9 cm. No internal  vascularity. On CT, there is a hypoattenuating 6.8 x 5 cm collection  in this region. Additional markedly dilated ducts and possible  collections in the caudate and central right hepatic lobe not well  visualized sonographically. On MRI there are dilated ducts in this  region with a dilated portion measuring up to 2.7 cm. The biliary tree  is not well evaluated sonographically in the liver due to marked  heterogeneity. Main portal vein is patent with antegrade flow.    Gallbladder: Gallbladder is slightly distended. Layering sludge and  stones better visualized on CT and MRI. There is no positive  sonographic Javier's sign. Gallbladder wall is mildly thickened at 4  mm with some mild edema likely reactive given liver disease and  ascites.    Bile Ducts: Both the intra- and extrahepatic biliary system are of  normal caliber.  The common bile duct measures 4 mm in diameter.  Biliary stent not well-visualized sonographically.    Pancreas: Limited visualization. Nodule adjacent to the pancreatic  head measuring 2.7 x 1.3 x 2.5 cm likely a lymph node when correlated  to prior CT and MRI. These have been previously biopsied as sarcoid  when correlated to prior MRI.    Kidney: The right kidney measures 12.4 cm long. There is no  hydronephrosis or hydroureter, no shadowing renal calculi, cystic  lesion or mass.       Impression    IMPRESSION:   1.  Changes from hepatic cirrhosis with marked heterogeneity and  distorted anatomy.  2.  Complex septated cystic collection in the  right hepatic lobe  demonstrated measuring up to 5.2 cm. This appears to have increased in  size from MRI where there was a severely dilated duct in this region  measuring up to 2.7 cm and correlates with a hypoattenuating area on  CT. Given cholangitis history, this is suspicious for an intrahepatic  abscess. Additional collections have also increased in size/are new  from MRI (where there were severely dilated ducts and debris) and are  noted on CT. Limited assessment of these areas sonographically.  Consider close follow-up MRI/MRCP as clinically warranted.  3.  Enlarged lymph node adjacent to the pancreas. This previously  biopsied as sarcoid.  4.  Common bile duct not significantly distended. Biliary stent not  well shown sonographically.  5.  Ascites redemonstrated, decreased in volume given paracentesis  5/3/2021.    VIDHYA TREADWELL MD   CT Dental wo Contrast    Narrative    CT DENTAL WO CONTRAST 5/5/2021 10:53 AM    History:  evaluate for abscess given strep mitis bacteremia    Comparison:  None available      TECHNIQUE: 3-D reconstruction by the technologists, with curved  multiplanar reformat of thin section imaging through the mandible and  maxilla obtained without intravenous contrast.    FINDINGS:  No significant soft tissue swelling or mass. Normal facial bone  alignment. No bony erosion. Large dental caries of left maxillary  second premolar without significant periapical disease. The orbits are  unremarkable.    Visualized portions of the paranasal sinuses are clear. Degenerative  changes of the right temporomandibular joint with mild mandibular head  subchondral erosion. The left temporomandibular joint is normal.      Impression    IMPRESSION:  No periapical abscess. Large dental caries of the left maxillary  second premolar.    I have personally reviewed the examination and initial interpretation  and I agree with the findings.    HAZEL PAK MD   XR Surgery CARRIE Fluoro L/T 5 Min w Stills     Narrative    This exam was marked as non-reportable because it will not be read by a   radiologist or a [place] non-radiologist provider.         US Abdomen Limited    Narrative    EXAMINATION: US ABDOMEN COMPLETE   5/7/2021 9:09 AM      HISTORY: Evaluate for resolution of possible abscess after ERCP    COMPARISON: Ultrasound abdomen 5/4/2021, 5/3/2021    TECHNIQUE: The abdomen was scanned in standard fashion with  specialized ultrasound transducer(s) using both gray-scale and limited  color Doppler techniques.    FINDINGS: The liver has a diffusely coarsened echotexture and measures  15.5 cm in the craniocaudal dimension. Moderate ascites, slightly  increased from previous. Septated cystic lesion in the posterior right  hepatic lobe measuring 4.0 x 4.5 x 4.1 cm, previously 5.2 x 3.7 x 3.9  cm. The pancreas is not well visualized on this exam. Gallbladder  appears enlarged with wall thickening measuring up to 5.5 mm.  Sonographic Javier's sign is negative. There is intraluminal sludge  but no gallstones. The walls of the common bile duct are not well seen  due to interval placement of common bile duct stent. The aorta and IVC  are normal. The right kidney is partially obscured on today's exam.      Impression    IMPRESSION:  1. Cirrhotic changes of the liver.  2. Complex septated cystic lesion in the right hepatic lobe appears  slightly smaller in size from previous exam.  3. The gallbladder appears enlarged with wall thickening and  intraluminal sludge likely related to underlying liver disease. No  focal tenderness to suggest acute cholecystitis. A common bile duct  stent is visualized on today's exam.  3. Moderate volume ascites, slightly increased from previous exam.    I have personally reviewed the examination and initial interpretation  and I agree with the findings.    MARTINEZ JAVED MD   Echo Complete    Narrative    [id number]  [id number]  [id number]  [id number]^TAI^EULOGIO     [facility]  [place]  Echocardiography Laboratory  [address] [place] [id number]     Name: ESTUARDO JOYCE  MRN: [id number]  : 1965  Study Date: 2021 01:08 PM  Age: 55 yrs  Gender: Female  Patient Location: [facility]  Reason For Study: Endocarditis  Ordering Physician: EULOGIO LUJAN  Referring Physician: SHERRON FAIRBANKS  Performed By: LANCE Harding     BSA: 1.8 m2  Height: 65 in  Weight: 158 lb  BP: 128/91 mmHg  ______________________________________________________________________________  Procedure  Complete Portable Echo Adult.  ______________________________________________________________________________  Interpretation Summary  Global and regional left ventricular function is normal with an EF of 60-65%.  Global right ventricular function is normal. The right ventricle is normal  size.  No significant valvular abnormalities.  IVC diameter <2.1 cm collapsing >50% with sniff suggests a normal RA pressure  of 3 mmHg.  Ascites is noted.  This study was compared with the study from 2021 (resting tomograms from  stress study). No significant changes noted.  ______________________________________________________________________________  Left Ventricle  Global and regional left ventricular function is normal with an EF of 60-65%.  Left ventricular wall thickness is normal. Left ventricular size is normal.  Left ventricular diastolic function is indeterminate.     Right Ventricle  Global right ventricular function is normal. The right ventricle is normal  size.     Atria  Both atria appear normal.     Mitral Valve  The mitral valve is normal. Trace mitral insufficiency is present.     Aortic Valve  The aortic valve is tricuspid. On Doppler interrogation, there is no  significant stenosis or regurgitation.     Tricuspid Valve  The tricuspid valve is normal. Trace tricuspid insufficiency is present.  Pulmonary artery systolic pressure cannot be assessed.     Pulmonic Valve  The valve leaflets  are not well visualized. Trace pulmonic insufficiency is  present.     Vessels  Sinuses of Valsalva 3.0 cm. Ascending aorta 3.5 cm. IVC diameter <2.1 cm  collapsing >50% with sniff suggests a normal RA pressure of 3 mmHg.     Pericardium  No pericardial effusion is present.     Miscellaneous  Ascites is noted.     Compared to Previous Study  This study was compared with the study from 2021 (resting tomograms from  stress study) . No significant changes noted.  ______________________________________________________________________________  MMode/2D Measurements & Calculations  IVSd: 0.79 cm  LVIDd: 4.5 cm  LVIDs: 2.6 cm  LVPWd: 0.79 cm  FS: 41.4 %  LV mass(C)d: 110.9 grams  LV mass(C)dI: 62.0 grams/m2  Ao root diam: 3.0 cm  asc Aorta Diam: 3.5 cm  LVOT diam: 2.3 cm  LVOT area: 4.2 cm2  LA Volume (BP): 55.6 ml     LA Volume Index (BP): 31.1 ml/m2  RWT: 0.35     Doppler Measurements & Calculations  MV E max christiano: 66.0 cm/sec  MV A max christiano: 90.0 cm/sec  MV E/A: 0.73  MV dec slope: 284.0 cm/sec2  MV dec time: 0.23 sec  PA V2 max: 103.0 cm/sec  PA max P.2 mmHg  PA acc time: 0.14 sec  E/E' av.7  Lateral E/e': 6.7  Medial E/e': 10.6     ______________________________________________________________________________  Report approved by: Ho Dunlap 2021 02:08 PM

## 2021-05-07 NOTE — CONSULTS
Consult and Procedure Service - Procedure Note    Attending:Kamari Villalobos DO  Resident: n/a  Procedure: Diagnostic and therapeutic paracentesis  Indication: Abdominal pain and distension  Risk Assessment: Normal  Pre-procedure diagnosis: ascites  Post-procedure diagnosis: same    The risks and benefits of the procedure were explained to the patient who expressed understanding and opted to proceed.  Consent was obtained and placed in the chart.  A time out was performed.  An area of ascites was located and marked using ultrasound guidance in the right lower quadrant; the area was prepped and draped in the usual sterile fashion.  5 ml of 1% lidocaine was instilled and ascites located.  The [facility] paracentesis catheter and needle were inserted under real-time guidance until ascites obtained then the needle removed and the catheter advanced.  The apparatus was connected to vacuum bottles and a total of 4900 ml of yellow colored fluid removed.   A specimen was sent for analysis. The catheter was withdrawn and the area dressed.  Patient tolerated the procedure well with no immediate complications.  Please contact the Consult and Procedure Service if any complications or concerns arise.       DOS:  05/07/21

## 2021-05-07 NOTE — PROGRESS NOTES
[facility]    Medicine Progress Note - Hospitalist Service, Gold 11    Pt is a 55 year old with hx of PSC with cirrhosis, biliary stents in place, ascites, UC on mesalamine, pancreatic mucinous cystadenoma, undergoing liver transplant evaluation who presented to [facility] with septic shock due to S. Mitis bacteremia and SBP. Stable on Zosyn, off pressors since 5/1, BCx NGTD since 5/1. Now admitted to [facility] 5/03 for further care. Transitioned to ceftriaxone for strep mitis bacteremia. Underwent ERCP 5/5 w/ one stent removed, one stent placed and large amounts of stone and pus removed suggestive of cholangitis. Also noted to have possible ~ 2 cm abscess/collection off of the left intrahepatic that could not be accessed. ID consulted and recommended a repeat RUQ US which showed some decrease in collections noted prior. Planning on discharging with IV abx with follow up imaging.       Date of Admission:  5/3/2021  Assessment & Plan      Acute decompensated cirrhosis likely secondary to SBP  Primary sclerosing cholangitis with chronic cirrhosis  Recurrent ascites  Grade III esophageal varices not banded  Ascending cholangitis w/ possible hepatic abscess   Admitted to [facility] [place] 4/29 with septic shock from strep mitis bacteremia and SBP. Paracentesis 4/29 with 1610 WBCs, and 60% neutrophils.  She was stabilized with fluids and abx and transferred to [facility]. Underwent ERCP 5/5 w/ one stent removed, one stent placed and large amounts of stone and pus removed suggestive of cholangitis. Also noted to have possible ~ 2 cm abscess/collection off of the left intrahepatic that could not be accessed. ID consulted and recommended a repeat RUQ US which showed some decrease in collections noted prior. Planning on discharging with IV abx with follow up imaging.  - Hepatology consulted for further transplant workup  - ID consulted thanks for the recs  - Lasix 20 mg daily, spironolactone 50  mg daily.  -Daily MELD labs.  - Continue Ceftriaxone 2 gQ24 H + Flagyl 500 mg Q8H  - Plan for minimum two weeks IV Ceftriaxone than RUQ US to determine further length for hepatic abscess will talk with ID about follow up  - SBP ppx after abx course   - PICC line to be placed today   - Abdomen more tender today so will get para      Spontaneous bacterial peritonitis  Septic shock, resolved  Strep mitis bacteremia  Require Levophed for short duration at [facility] and now HDS. TTE w/o signs of vegetations on valves. CT dental w/o sign of abscess. Initial plan was for ceftriaxone for 10-14 days to cover the bacteremia but now with her possible abscess ID consulted for assistance. See above for abx plan.  - ID consulted thanks for the recs  - Continue Ceftriaxone 2 gQ24 H + Flagyl 500 mg Q8H     Possible hematochezia  Patient noted some blood in the toilet 2 days prior to transfer but has had none since  -As needed senna and Dulcolax.     GONZALO, resolved  -Likely related to septic shock.    NAGMA  Started on initial presentation to [facility] due to GONZALO. Has remained stable will continue to monitor and workup further if bicarb continues to drop. No diarrhea. Could consider workup for RTA if persistent.  - BMP daily     Ulcerative colitis  -Continue PTA mesalamine.     Severe malnutrition in the context of acute on chronic illness  -Nutrition consult.     Hypertension  -Hold PTA lisinopril restart nadolol given varices     Hyperlipidemia  -Hold PTA statin.       Diet: Snacks/Supplements Adult: Other; Ensure Enlive Shake (chocolate) at HS snack time; Between Meals  Regular Diet Adult    DVT Prophylaxis: Pneumatic Compression Devices  Jurado Catheter: not present  Code Status: Full Code           Disposition Plan   Expected discharge: tomorrow, recommended to prior living arrangement once antibiotic plan established and safe disposition plan/ TCU bed available.  Entered: Ruben Hay DO 05/07/2021, 2:51  PM       The patient's care was discussed with the Bedside Nurse, Patient and hepatology, panc/bili Consultant.    Ruben Hay, DO  Hospitalist Service, [address]  Contact information available via [facility] Paging/Directory  Please see sign in/sign out for up to date coverage information  ______________________________________________________________________    Interval History     No acute events overnight. Feeling well today again although abdomen more distended and uncomfortable. She is hopeful to go home tomorrow.    Four point ROS completed and otherwise negative    Data reviewed today: I reviewed all medications, new labs and imaging results over the last 24 hours. I personally reviewed no images or EKG's today.    Physical Exam   Vital Signs: Temp: 97.4  F (36.3  C) Temp src: Oral BP: 119/73 Pulse: 69   Resp: 18 SpO2: 98 % O2 Device: None (Room air)    Weight: 155 lbs 0 oz    Gen: awake and alert, appears comfortable, appears stated age  HEENT: NCAT, scleral icterus present  CV: regular rate with regular rhythm, S1/S2, extremities warm and well perfused, trace bilateral lower extremity 1+ edema  Pulm: normal work of breathing, lungs clear to auscultation, no crackles or wheezing  GI: Nontender, abdomen is more distended but soft and not painful  Skin: warm, diffusely jaundiced  Neuro: Aox3, speech normal, moves all extremities symmetrically and equally, no asterixis or clonus  Psych: mood is good, affect is congruent    Data   Recent Labs   Lab 05/07/21  0543 05/06/21  0544 05/05/21  0605   WBC 16.4* 14.8* 20.4*   HGB 10.0* 10.3* 10.2*   MCV 93 94 93    168 200   INR 1.36* 1.46* 1.50*    136 138   POTASSIUM 3.8 3.6 3.6   CHLORIDE 112* 107 111*   CO2 20 18* 17*   BUN 19 24 19   CR 0.57 0.61 0.53   ANIONGAP 8 11 10   MARIELENA 8.7 9.0 8.8   GLC 85 183* 100*   ALBUMIN 2.0* 2.1* 1.9*   PROTTOTAL 6.0* 6.4* 6.0*   BILITOTAL 1.5* 1.9* 2.0*   ALKPHOS 671*  758* 828*   ALT 90* 89* 110*   AST 93* 69* 122*     Recent Results (from the past 24 hour(s))   US Abdomen Limited    Narrative    EXAMINATION: US ABDOMEN COMPLETE   5/7/2021 9:09 AM      HISTORY: Evaluate for resolution of possible abscess after ERCP    COMPARISON: Ultrasound abdomen 5/4/2021, 5/3/2021    TECHNIQUE: The abdomen was scanned in standard fashion with  specialized ultrasound transducer(s) using both gray-scale and limited  color Doppler techniques.    FINDINGS: The liver has a diffusely coarsened echotexture and measures  15.5 cm in the craniocaudal dimension. Moderate ascites, slightly  increased from previous. Septated cystic lesion in the posterior right  hepatic lobe measuring 4.0 x 4.5 x 4.1 cm, previously 5.2 x 3.7 x 3.9  cm. The pancreas is not well visualized on this exam. Gallbladder  appears enlarged with wall thickening measuring up to 5.5 mm.  Sonographic Javier's sign is negative. There is intraluminal sludge  but no gallstones. The walls of the common bile duct are not well seen  due to interval placement of common bile duct stent. The aorta and IVC  are normal. The right kidney is partially obscured on today's exam.      Impression    IMPRESSION:  1. Cirrhotic changes of the liver.  2. Complex septated cystic lesion in the right hepatic lobe appears  slightly smaller in size from previous exam.  3. The gallbladder appears enlarged with wall thickening and  intraluminal sludge likely related to underlying liver disease. No  focal tenderness to suggest acute cholecystitis. A common bile duct  stent is visualized on today's exam.  3. Moderate volume ascites, slightly increased from previous exam.    I have personally reviewed the examination and initial interpretation  and I agree with the findings.    MARTINEZ JAVED MD     Medications     - MEDICATION INSTRUCTIONS -         carvedilol  6.25 mg Oral BID w/meals     cefTRIAXone  2 g Intravenous Q24H     furosemide  20 mg Oral Daily      mesalamine  2,400 mg Oral Daily with breakfast     metroNIDAZOLE  500 mg Oral TID     multivitamin w/minerals  1 tablet Oral Daily     potassium chloride ER  10 mEq Oral At Bedtime     simvastatin  20 mg Oral At Bedtime     sodium chloride (PF)  3 mL Intracatheter Q8H     spironolactone  50 mg Oral Daily     ursodiol  300 mg Oral BID

## 2021-05-07 NOTE — PLAN OF CARE
"/73 (BP Location: Left arm)   Pulse 69   Temp 97.4  F (36.3  C) (Oral)   Resp 18   Ht 1.651 m (5' 5\")   Wt 70.3 kg (155 lb)   SpO2 98%   BMI 25.79 kg/m     Neuro: A/Ox4  VS: VSS on RA  Pain: Denies  GI: on regular diet and tolerating good. Denies nausea. Small BM and pt want to take bowel BM tonight  : Voiding without difficulty  Skin: Jaundiced/intact  PIVx2 SL between IV antibiotics  Activity - Independent  Education - Bowel meds  Plan of Care - Pt had PARA completed at bedside and 5L fluids removed. Pt is getting PICC inserted this afternoon. Continue with POC    "

## 2021-05-07 NOTE — PROGRESS NOTES
Care Management Follow Up    Length of Stay (days): 4    Expected Discharge Date: 05/07/21     Concerns to be Addressed: discharge planning, care coordination/care conferences     Patient plan of care discussed at interdisciplinary rounds: Yes    Anticipated Discharge Disposition: Home, Home Infusion     Anticipated Discharge Services: None  Anticipated Discharge DME: None    Additional Information:  Benefit check from [facility]:   Patient Berta Nava has coverage for iv abx through her UMR plan, she is covered 100% since her $4000 ded and $6000 oop have been met in full.      Tierra Ferrari, RN  Float RN Care Coordinator  Pager [phone] (unit RNCC pager)     To get in touch with the Weekend & Holiday on call RN Care Coordinator:  Pager:  [phone] OR Care Coordinator job code/pager [phone]

## 2021-05-07 NOTE — PROGRESS NOTES
"[facility]  HEPATOLOGY PROGRESS NOTE  Berta Nava [id number]       CC: fevers, lethargy  SUBJECTIVE:  Continues to feel improved following recent ERCP. Has appetite and denies nausea, fevers, or vomiting. She has been able to walk around room and in [place] without difficulty.     ROS:  A 10-point review of systems was negative.    OBJECTIVE:  VS: /73 (BP Location: Left arm)   Pulse 69   Temp 97.4  F (36.3  C) (Oral)   Resp 18   Ht 1.651 m (5' 5\")   Wt 70.3 kg (155 lb)   SpO2 98%   BMI 25.79 kg/m       Gross per 24 hour   Intake 480 ml   Output --   Net 480 ml     General: In no acute distress, mild facial muscle wasting  Neuro: AOx3, No asterixis  Lymph: No cervical lymphadenopathy  HEENT:  Noscleral icterus, Nooral lesions  CV: S1/L4gaegasv murmurs. Skin warm and dry  Lungs: clear to auscultation, diminished bases Respirations even and nonlabored on room air  Abd:  Mildly distended, nontender    Extrem: Noperipheral edema   Skin: mild jaundice  Psych:affect and mood normal    MEDICATIONS:  Current Facility-Administered Medications   Medication     bisacodyl (DULCOLAX) Suppository 10 mg     carvedilol (COREG) tablet 6.25 mg     cefTRIAXone (ROCEPHIN) 2 g vial to attach to  ml bag for ADULTS or NS 50 ml bag for PEDS     furosemide (LASIX) tablet 20 mg     heparin lock flush 10 UNIT/ML injection 2-5 mL     lidocaine (LMX4) cream     lidocaine (LMX4) cream     lidocaine 1 % 0.1-1 mL     lidocaine 1 % 0.1-5 mL     May continue current IV fluids if patient has IV fluids infusing.     melatonin tablet 3 mg     mesalamine (LIALDA) EC tablet 2,400 mg     metroNIDAZOLE (FLAGYL) tablet 500 mg     multivitamin w/minerals (THERA-VIT-M) tablet 1 tablet     ondansetron (ZOFRAN-ODT) ODT tab 4 mg    Or     ondansetron (ZOFRAN) injection 4 mg     polyethylene glycol (MIRALAX) Packet 17 g     potassium chloride ER (MICRO-K) CR capsule 10 mEq     senna-docusate (SENOKOT-S/PERICOLACE) 8.6-50 MG per tablet 2 " tablet     simvastatin (ZOCOR) tablet 20 mg     sodium chloride (PF) 0.9% PF flush 3 mL     sodium chloride (PF) 0.9% PF flush 3 mL     sodium chloride (PF) 0.9% PF flush 3 mL     sodium chloride (PF) 0.9% PF flush 5-50 mL     spironolactone (ALDACTONE) tablet 50 mg     ursodiol (ACTIGALL) capsule 300 mg       REVIEW OF LABORATORY, PATHOLOGY AND IMAGING RESULTS:  BMP  Recent Labs   Lab 05/07/21 0543 05/06/21 0544 05/05/21 0605 05/04/21  0739    136 138 139   POTASSIUM 3.8 3.6 3.6 3.4   CHLORIDE 112* 107 111* 112*   MARIELENA 8.7 9.0 8.8 9.2   CO2 20 18* 17* 18*   BUN 19 24 19 22   CR 0.57 0.61 0.53 0.74   GLC 85 183* 100* 82     CBC  Recent Labs   Lab 05/07/21 0543 05/06/21 0544 05/05/21 0605 05/04/21  0739   WBC 16.4* 14.8* 20.4* 17.6*   RBC 3.30* 3.38* 3.38* 3.55*   HGB 10.0* 10.3* 10.2* 10.7*   HCT 30.6* 31.6* 31.3* 33.3*   MCV 93 94 93 94   MCH 30.3 30.5 30.2 30.1   MCHC 32.7 32.6 32.6 32.1   RDW 16.7* 16.5* 16.3* 16.2*    168 200 203     INR  Recent Labs   Lab 05/07/21 0543 05/06/21 0544 05/05/21 0605 05/04/21  0739   INR 1.36* 1.46* 1.50* 1.54*     LFTs  Recent Labs   Lab 05/07/21 0543 05/06/21 0544 05/05/21 0605 05/04/21  0739   ALKPHOS 671* 758* 828* 737*   AST 93* 69* 122* 153*   ALT 90* 89* 110* 126*   BILITOTAL 1.5* 1.9* 2.0* 2.6*   PROTTOTAL 6.0* 6.4* 6.0* 6.2*   ALBUMIN 2.0* 2.1* 1.9* 2.1*      PANCNo lab results found in last 7 days.   MELD-Na score: 12 at 5/7/2021  5:43 AM  MELD score: 12 at 5/7/2021  5:43 AM  Calculated from:  Serum Creatinine: 0.57 mg/dL (Rounded to 1 mg/dL) at 5/7/2021  5:43 AM  Serum Sodium: 139 mmol/L (Rounded to 137 mmol/L) at 5/7/2021  5:43 AM  Total Bilirubin: 1.5 mg/dL at 5/7/2021  5:43 AM  INR(ratio): 1.36 at 5/7/2021  5:43 AM  Age: 55 years 8 months      Imaging Results:  ERCP 5/5/21  Impression:          - One partially occluded stent from the biliary tree was                        seen in the major papilla. Removed.                        -  Cholangiogram showed diffusely irregular biliary tree                        with paucity/loss of multiple tertiary branches                        consistent with PSC/cirrhosis. Multiple filling defects                        throught the biliary tree consistent with stones/sludge                        particularly in the left intrahepatic. There appeared to                        be a ~2 cm collection/abscess off of the left                        intrahepatic. Attempts to gain wire access to this                        collection were unsuccessful.                        - Cystic duct/gallbladder opacified                        - Stone clearance was achieved with balloon dilation and                        balloon/basket sweeping. Large amount of stone,                        sludge/cast-like material with associated pus removed                        - One 10 Fr x 20 cm Johlin stent placed into the left                        intrahepatic biliary tree                        - Grade III esophageal varices. Not banded at this time                        due to concern that early repeat ERCP intervention could                        be needed for recurrent cholangitis     US abd 5/7/21  IMPRESSION:  1. Cirrhotic changes of the liver.  2. Complex septated cystic lesion in the right hepatic lobe appears  slightly smaller in size from previous exam.  3. The gallbladder appears enlarged with wall thickening and  intraluminal sludge likely related to underlying liver disease. No  focal tenderness to suggest acute cholecystitis. A common bile duct  stent is visualized on today's exam.  3. Moderate volume ascites, slightly increased from previous exam.    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 She was started on Zosyn and transitioned to ceftriaxone.     RECOMMENDATIONS:  1. Strep mitis bacteremia with sepsis  - strep mitis + on 4/29, follow up negative 5/1, 5/3. pansensitive  - has been on zosyn since 4/29-5/3. Ceftriaxone started 5/4  - more stable VS, did require pressor support at OSH  - echocardiogram negative for vegetations  -  Dental CT with caries, advised she needs to visit with dentist following discharge. No abscess.       2. SBP  3. Ascites  - initial para on 4/29 with 1094 PMN's, culture negative. Received 75 gm Albumin on 4/29, 50 gms on 5/3.   - now on ceftriaxone Will need SBP ppx with ciprofloxacin 500 mg daily when abx completed. Followed by ID and will be receiving IV abx at home.   - para as needed- has outpatient scheduled next week.   - has been on low dose diuretics with lasix 20 mg daily and spironolactone 50 mg daily.      4. PSC  5. Biliary strictures/stones  6. Hepatic abscess  - will need mammogram as outpatient to complete tx eval.   - ERCP 5/5 with paritally occluded stent, duct swept with casts and stones.  Plan for repeat ERCP in 1 month.   - has been ursodiol 300 mg BID  - in transplant evaluation, MELD 12, ABO AB  - US 5/4 without evidence of HCC     7. Esophageal varices  - EGD 12/1/20 with gr II-III EV. Not banded at that time. Changed nadolol to carvedilol 6.25 BID   - large gr III EV seen on EGD, not banded.   - no current signs of bleeding.      8. Immunizations  - please start HBV/HAV vaccination prior to discharge.     Patient was discussed with attending physician, Dr. Emma Mao    Next follow up appointment: Dr. Leventhal 6/21    Thank you for the opportunity to be involved with  Berta AYE Cruzon jose carlos. Please call with any questions or concerns.    CHELSEY Leos,  CNP  Inpatient Hepatology [place]  Text Link

## 2021-05-07 NOTE — PROGRESS NOTES
[facility]     Received referral from Brissa Ruelas RNCC for IV antibiotics.  Benefits verified.  Pt has a UMR plan, she is covered 100% since her $4000 ded and $6000 oop have been met in full.  Spoke with patient to review home infusion services, review benefits and offer choice of providers.   Patient would like to use [facility] for home infusion.  Berta is expected to dc soon and will likely be going home on IV ceftriaxone.  She has not done home IV therapy before and will need initial teaching.  Met with patient at bedside and provided her with information about I services.  Explained about IV push administration method with SASH flushing, showed her the teaching materials and explained that a home nurse will provide additional teaching needed for self-administration.  Informed her about supplies and delivery of supplies, storage of medication, dosing times, plan for SNV and 24/7 availability of I staff while on IV therapy.     Berta verbalized understanding of all information given.   She is willing and able to learn and manage home IV therapy.  Questions answered.  Plan for [facility] to provide home SNVs for teaching, assessments, picc cares, and labs as ordered.    [facility] Liaison will follow along to assist with discharge home with infusion needs.     Thank you for the referral.     ADRIANA Velez  [facility] Nurse Liaison   [email or web address]  Cell: [phone] M-F  [facility] Main: [phone]

## 2021-05-08 ENCOUNTER — APPOINTMENT (OUTPATIENT)
Dept: CT IMAGING | Facility: CLINIC | Age: 56
DRG: 444 | End: 2021-05-08
Attending: STUDENT IN AN ORGANIZED HEALTH CARE EDUCATION/TRAINING PROGRAM
Payer: COMMERCIAL

## 2021-05-08 LAB
ALBUMIN SERPL-MCNC: 2 G/DL (ref 3.4–5)
ALP SERPL-CCNC: 672 U/L (ref 40–150)
ALT SERPL W P-5'-P-CCNC: 77 U/L (ref 0–50)
ANION GAP SERPL CALCULATED.3IONS-SCNC: 6 MMOL/L (ref 3–14)
AST SERPL W P-5'-P-CCNC: 67 U/L (ref 0–45)
BACTERIA SPEC CULT: NO GROWTH
BASOPHILS # BLD AUTO: 0 10E9/L (ref 0–0.2)
BASOPHILS NFR BLD AUTO: 0 %
BILIRUB SERPL-MCNC: 1.9 MG/DL (ref 0.2–1.3)
BUN SERPL-MCNC: 19 MG/DL (ref 7–30)
CALCIUM SERPL-MCNC: 8.8 MG/DL (ref 8.5–10.1)
CHLORIDE SERPL-SCNC: 109 MMOL/L (ref 94–109)
CO2 SERPL-SCNC: 19 MMOL/L (ref 20–32)
CREAT SERPL-MCNC: 0.48 MG/DL (ref 0.52–1.04)
DIFFERENTIAL METHOD BLD: ABNORMAL
EOSINOPHIL # BLD AUTO: 0 10E9/L (ref 0–0.7)
EOSINOPHIL NFR BLD AUTO: 0.1 %
ERYTHROCYTE [DISTWIDTH] IN BLOOD BY AUTOMATED COUNT: 16.7 % (ref 10–15)
GFR SERPL CREATININE-BSD FRML MDRD: >90 ML/MIN/{1.73_M2}
GLUCOSE SERPL-MCNC: 136 MG/DL (ref 70–99)
HCT VFR BLD AUTO: 31.6 % (ref 35–47)
HGB BLD-MCNC: 10.3 G/DL (ref 11.7–15.7)
IMM GRANULOCYTES # BLD: 0.1 10E9/L (ref 0–0.4)
IMM GRANULOCYTES NFR BLD: 0.8 %
INR PPP: 1.48 (ref 0.86–1.14)
LYMPHOCYTES # BLD AUTO: 0.5 10E9/L (ref 0.8–5.3)
LYMPHOCYTES NFR BLD AUTO: 4.4 %
MCH RBC QN AUTO: 29.5 PG (ref 26.5–33)
MCHC RBC AUTO-ENTMCNC: 32.6 G/DL (ref 31.5–36.5)
MCV RBC AUTO: 91 FL (ref 78–100)
MONOCYTES # BLD AUTO: 0.1 10E9/L (ref 0–1.3)
MONOCYTES NFR BLD AUTO: 0.9 %
NEUTROPHILS # BLD AUTO: 10.4 10E9/L (ref 1.6–8.3)
NEUTROPHILS NFR BLD AUTO: 93.8 %
NRBC # BLD AUTO: 0 10*3/UL
NRBC BLD AUTO-RTO: 0 /100
PLATELET # BLD AUTO: 212 10E9/L (ref 150–450)
POTASSIUM SERPL-SCNC: 4.1 MMOL/L (ref 3.4–5.3)
PROT SERPL-MCNC: 6.1 G/DL (ref 6.8–8.8)
RBC # BLD AUTO: 3.49 10E12/L (ref 3.8–5.2)
SODIUM SERPL-SCNC: 134 MMOL/L (ref 133–144)
SPECIMEN SOURCE: NORMAL
WBC # BLD AUTO: 11.1 10E9/L (ref 4–11)

## 2021-05-08 PROCEDURE — 272N000201 ZZ HC ADHESIVE SKIN CLOSURE, DERMABOND

## 2021-05-08 PROCEDURE — 36415 COLL VENOUS BLD VENIPUNCTURE: CPT | Performed by: INTERNAL MEDICINE

## 2021-05-08 PROCEDURE — 99232 SBSQ HOSP IP/OBS MODERATE 35: CPT | Performed by: STUDENT IN AN ORGANIZED HEALTH CARE EDUCATION/TRAINING PROGRAM

## 2021-05-08 PROCEDURE — 80053 COMPREHEN METABOLIC PANEL: CPT | Performed by: INTERNAL MEDICINE

## 2021-05-08 PROCEDURE — 250N000011 HC RX IP 250 OP 636: Performed by: STUDENT IN AN ORGANIZED HEALTH CARE EDUCATION/TRAINING PROGRAM

## 2021-05-08 PROCEDURE — 250N000013 HC RX MED GY IP 250 OP 250 PS 637: Performed by: INTERNAL MEDICINE

## 2021-05-08 PROCEDURE — 120N000011 HC R&B TRANSPLANT UMMC

## 2021-05-08 PROCEDURE — 74177 CT ABD & PELVIS W/CONTRAST: CPT | Mod: 26 | Performed by: RADIOLOGY

## 2021-05-08 PROCEDURE — 74177 CT ABD & PELVIS W/CONTRAST: CPT

## 2021-05-08 PROCEDURE — 250N000011 HC RX IP 250 OP 636: Performed by: INTERNAL MEDICINE

## 2021-05-08 PROCEDURE — 250N000012 HC RX MED GY IP 250 OP 636 PS 637: Performed by: STUDENT IN AN ORGANIZED HEALTH CARE EDUCATION/TRAINING PROGRAM

## 2021-05-08 PROCEDURE — 250N000013 HC RX MED GY IP 250 OP 250 PS 637: Performed by: NURSE PRACTITIONER

## 2021-05-08 PROCEDURE — 250N000009 HC RX 250: Performed by: STUDENT IN AN ORGANIZED HEALTH CARE EDUCATION/TRAINING PROGRAM

## 2021-05-08 PROCEDURE — 272N000458 ZZ HC KIT, 5 FR DL BIOFLO OPEN ENDED PICC

## 2021-05-08 PROCEDURE — 85025 COMPLETE CBC W/AUTO DIFF WBC: CPT | Performed by: INTERNAL MEDICINE

## 2021-05-08 PROCEDURE — 250N000013 HC RX MED GY IP 250 OP 250 PS 637: Performed by: STUDENT IN AN ORGANIZED HEALTH CARE EDUCATION/TRAINING PROGRAM

## 2021-05-08 PROCEDURE — 36569 INSJ PICC 5 YR+ W/O IMAGING: CPT

## 2021-05-08 PROCEDURE — 85610 PROTHROMBIN TIME: CPT | Performed by: INTERNAL MEDICINE

## 2021-05-08 RX ORDER — HEPARIN SODIUM,PORCINE 10 UNIT/ML
5-10 VIAL (ML) INTRAVENOUS EVERY 24 HOURS
Status: DISCONTINUED | OUTPATIENT
Start: 2021-05-08 | End: 2021-05-12 | Stop reason: HOSPADM

## 2021-05-08 RX ORDER — IOPAMIDOL 755 MG/ML
95 INJECTION, SOLUTION INTRAVASCULAR ONCE
Status: COMPLETED | OUTPATIENT
Start: 2021-05-08 | End: 2021-05-08

## 2021-05-08 RX ORDER — HEPARIN SODIUM,PORCINE 10 UNIT/ML
5-10 VIAL (ML) INTRAVENOUS
Status: DISCONTINUED | OUTPATIENT
Start: 2021-05-08 | End: 2021-05-12 | Stop reason: HOSPADM

## 2021-05-08 RX ADMIN — Medication 10 ML: at 14:23

## 2021-05-08 RX ADMIN — METHYLPREDNISOLONE 32 MG: 32 TABLET ORAL at 06:01

## 2021-05-08 RX ADMIN — METRONIDAZOLE 500 MG: 500 TABLET ORAL at 13:25

## 2021-05-08 RX ADMIN — DOCUSATE SODIUM 50 MG AND SENNOSIDES 8.6 MG 2 TABLET: 8.6; 5 TABLET, FILM COATED ORAL at 14:30

## 2021-05-08 RX ADMIN — MESALAMINE 2400 MG: 1.2 TABLET, DELAYED RELEASE ORAL at 09:23

## 2021-05-08 RX ADMIN — URSODIOL 300 MG: 300 CAPSULE ORAL at 09:24

## 2021-05-08 RX ADMIN — URSODIOL 300 MG: 300 CAPSULE ORAL at 19:52

## 2021-05-08 RX ADMIN — LIDOCAINE HYDROCHLORIDE ANHYDROUS 1 ML: 10 INJECTION, SOLUTION INFILTRATION at 09:04

## 2021-05-08 RX ADMIN — FUROSEMIDE 20 MG: 20 TABLET ORAL at 09:25

## 2021-05-08 RX ADMIN — IOPAMIDOL 95 ML: 755 INJECTION, SOLUTION INTRAVENOUS at 08:33

## 2021-05-08 RX ADMIN — SIMVASTATIN 20 MG: 5 TABLET, FILM COATED ORAL at 22:01

## 2021-05-08 RX ADMIN — POTASSIUM CHLORIDE 10 MEQ: 750 CAPSULE, EXTENDED RELEASE ORAL at 22:01

## 2021-05-08 RX ADMIN — Medication 1 TABLET: at 09:24

## 2021-05-08 RX ADMIN — METRONIDAZOLE 500 MG: 500 TABLET ORAL at 19:53

## 2021-05-08 RX ADMIN — CARVEDILOL 6.25 MG: 6.25 TABLET, FILM COATED ORAL at 09:25

## 2021-05-08 RX ADMIN — METRONIDAZOLE 500 MG: 500 TABLET ORAL at 09:24

## 2021-05-08 RX ADMIN — SPIRONOLACTONE 50 MG: 50 TABLET, FILM COATED ORAL at 09:23

## 2021-05-08 RX ADMIN — CARVEDILOL 6.25 MG: 6.25 TABLET, FILM COATED ORAL at 18:13

## 2021-05-08 RX ADMIN — CEFTRIAXONE 2 G: 2 INJECTION, POWDER, FOR SOLUTION INTRAMUSCULAR; INTRAVENOUS at 13:25

## 2021-05-08 ASSESSMENT — ACTIVITIES OF DAILY LIVING (ADL)
ADLS_ACUITY_SCORE: 14
ADLS_ACUITY_SCORE: 12

## 2021-05-08 NOTE — PROVIDER NOTIFICATION
05/08/21 0906   PICC Double Lumen 05/08/21 Right Brachial vein lateral   Placement Date/Time: 05/08/21 (c) 0906   Catheter Brand: Mozido  Size (Fr): 5 Fr  Lot #: [id number]  Full barrier precautions done: Yes, hand hygiene, sterile gown, sterile gloves, mask, cap, full body drape, chlorhexidine scrub  Consent Signed: Yes  Time...   Site Assessment WDL   External Cath Length (cm) 2 cm   Extremity Circumference (cm) 23 cm   Dressing Intervention Chlorhexidine patch;Transparent;Securing device;New dressing   Dressing Change Due 05/15/21   Purple - Status blood return noted;saline locked   Purple - Cap Change Due 05/12/21   Red - Status blood return noted;saline locked   Red - Cap Change Due 05/12/21   PICC Comment PICC inserted   Extravasation? No   Line Necessity Yes, meets criteria

## 2021-05-08 NOTE — CONSULTS
INTERVENTIONAL RADIOLOGY CONSULT    Patient is a 55-year-old female with history of primary sclerosing cholangitis with cirrhosis, biliary stents recently exchanged, admitted for septic shock from bacteremia and SBP.  Interventional radiology consulted for intrahepatic abscess drainage.    Patient recently had stent placed in the right bile duct with removal of stones/sludge.  There is a moderate sized collection in the posterior aspect of hepatic segment 7 which appears to connect with the right-sided biliary drain.  Based on recent ERCP images, this may be the collection of interest.  There are also dilated bile ducts with stones/sludge within them in the caudate lobe and hepatic segment 5.      Patient has moderate amount of ascites complicating the situation.  There would be a long parenchymal tract that would have to cross either the central liver or transpleural approach which is high risk for complication. It is also felt that percutaneous approach is high risk for contaminating the peritoneal cavity as well as bleeding due to the ascites. These collections are not amenable to percutaneous access for drainage.    - Covid: 5/3/2021, negative      B/P: 111/69, T: 98.1, P: 87, R: 16       CBC RESULTS:   Recent Labs   Lab Test 05/08/21  0533   WBC 11.1*   RBC 3.49*   HGB 10.3*   HCT 31.6*   MCV 91   MCH 29.5   MCHC 32.6   RDW 16.7*         Lab Results   Component Value Date    INR 1.48 05/08/2021    INR 1.36 05/07/2021     Lab Results   Component Value Date     05/08/2021     05/07/2021       PLAN: Patient is not a candidate for percutaneous access for intrahepatic biloma/collection.  From our standpoint it is felt the patient would be better served for endoscopic approach. Patient is high risk for procedural complications with a percutaneous approach.    Discussed with Dr. Benedict, IR staff.  Also discussed with Dr. Hay of primary team.     Winston Galan DO  Interventional  Radiology Resident  Pager [phone]  Call Pager for After Hours: [phone]

## 2021-05-08 NOTE — PLAN OF CARE
Neuro: A&Ox4.   Cardiac: BPs soft, 90s-100s/60s, unchanged. OVSS.   Respiratory: Sating 97% on RA, MG at times d/t abd distention   GI/: Voiding without difficulty, not saving. Reports 3 small BMs this shift, requested/given senna  Diet/appetite: Reg diet, appetite fair  Activity:  Up ind, up to chair and in halls.  Pain: Denies  Skin: No new deficits noted.  LDA's: R DL PICC, L PIV    Plan: Awaiting IR consult. Continue with POC. Notify primary team with changes.

## 2021-05-08 NOTE — PLAN OF CARE
All vital signs stable, denies pain or nausea. Good appetite on regular diet. Up independently in halls. Plan for CT tomorrow at 8 a.m. with methylprednisolone dose to be given at 6 a.m., then PICC placement for home IV antibiotics.

## 2021-05-08 NOTE — PROGRESS NOTES
[facility]    Medicine Progress Note - Hospitalist Service, Gold 11    Pt is a 55 year old with hx of PSC with cirrhosis, biliary stents in place, ascites, UC on mesalamine, pancreatic mucinous cystadenoma, undergoing liver transplant evaluation who presented to [facility] with septic shock due to S. Mitis bacteremia and SBP. Stable on Zosyn, off pressors since 5/1, BCx NGTD since 5/1. Now admitted to [facility] 5/03 for further care. Transitioned to ceftriaxone for strep mitis bacteremia. Underwent ERCP 5/5 w/ one stent removed, one stent placed and large amounts of stone and pus removed suggestive of cholangitis. Also noted to have possible ~ 2 cm abscess/collection off of the left intrahepatic that could not be accessed. ID consulted and recommended a repeat RUQ US which showed some decrease in collections noted prior. Planning on discharging with IV abx with follow up imaging.       Date of Admission:  5/3/2021  Assessment & Plan      Acute decompensated cirrhosis likely secondary to SBP  Primary sclerosing cholangitis with chronic cirrhosis  Recurrent ascites  Grade III esophageal varices not banded  Ascending cholangitis w/ possible hepatic abscess   Admitted to [facility] 4/29 with septic shock from strep mitis bacteremia and SBP. Paracentesis 4/29 with 1610 WBCs, and 60% neutrophils.  She was stabilized with fluids and abx and transferred to [facility]. Underwent ERCP 5/5 w/ one stent removed, one stent placed and large amounts of stone and pus removed suggestive of cholangitis. Also noted to have possible ~ 2 cm abscess/collection off of the left intrahepatic that could not be accessed. ID consulted and recommended a repeat RUQ US which showed some decrease in collections noted prior. Planning on discharging with IV abx with follow up imaging.  - Hepatology consulted for further transplant workup  - ID consulted thanks for the recs  - Lasix 20 mg daily, spironolactone 50  mg daily.  -Daily MELD labs.  - Continue Ceftriaxone 2 gQ24 H + Flagyl 500 mg Q8H  - Plan for minimum two weeks IV Ceftriaxone than RUQ US to determine further length for hepatic abscess will talk with ID about follow up  - SBP ppx after abx course   - PICC line to be placed today   - Abdomen more tender today so will get para      Spontaneous bacterial peritonitis  Septic shock, resolved  Strep mitis bacteremia  Require Levophed for short duration at [facility] and now [facility]. TTE w/o signs of vegetations on valves. CT dental w/o sign of abscess. Initial plan was for ceftriaxone for 10-14 days to cover the bacteremia but now with her possible abscess ID consulted for assistance. See above for abx plan.  - ID consulted thanks for the recs  - Continue Ceftriaxone 2 gQ24 H + Flagyl 500 mg Q8H     Possible hematochezia  Patient noted some blood in the toilet 2 days prior to transfer but has had none since  -As needed senna and Dulcolax.     GONZALO, resolved  -Likely related to septic shock.    NAGMA  Started on initial presentation to [facility] due to GONZALO. Has remained stable will continue to monitor and workup further if bicarb continues to drop. No diarrhea. Could consider workup for RTA if persistent.  - BMP daily     Ulcerative colitis  -Continue PTA mesalamine.     Severe malnutrition in the context of acute on chronic illness  -Nutrition consult.     Hypertension  -Hold PTA lisinopril restart nadolol given varices     Hyperlipidemia  -Hold PTA statin.       Diet: Snacks/Supplements Adult: Other; Ensure Enlive Shake (chocolate) at HS snack time; Between Meals  Regular Diet Adult    DVT Prophylaxis: Pneumatic Compression Devices  Jurado Catheter: not present  Code Status: Full Code           Disposition Plan   Expected discharge: tomorrow, recommended to prior living arrangement once antibiotic plan established and safe disposition plan/ TCU bed available.  Entered: Ruben Hay DO 05/08/2021, 3:02  PM       The patient's care was discussed with the Bedside Nurse, Patient and hepatology, panc/bili Consultant.    Ruben Hay, DO  Hospitalist Service, [address]  Contact information available via [facility] Paging/Directory  Please see sign in/sign out for up to date coverage information  ______________________________________________________________________    Interval History     No new  Changes doing well     Four point ROS completed and otherwise negative    Data reviewed today: I reviewed all medications, new labs and imaging results over the last 24 hours. I personally reviewed no images or EKG's today.    Physical Exam   Vital Signs: Temp: 98.1  F (36.7  C) Temp src: Oral BP: 111/69 Pulse: 87   Resp: 16 SpO2: 98 % O2 Device: None (Room air)    Weight: 155 lbs 0 oz    Gen: awake and alert, appears comfortable, appears stated age  HEENT: NCAT, scleral icterus present  CV: regular rate with regular rhythm, S1/S2, extremities warm and well perfused, trace bilateral lower extremity 1+ edema  Pulm: normal work of breathing, lungs clear to auscultation, no crackles or wheezing  GI: Nontender, abdomen is more distended but soft and not painful  Skin: warm, diffusely jaundiced  Neuro: Aox3, speech normal, moves all extremities symmetrically and equally, no asterixis or clonus  Psych: mood is good, affect is congruent    Data   Recent Labs   Lab 05/08/21  0533 05/07/21  0543 05/06/21  0544   WBC 11.1* 16.4* 14.8*   HGB 10.3* 10.0* 10.3*   MCV 91 93 94    207 168   INR 1.48* 1.36* 1.46*    139 136   POTASSIUM 4.1 3.8 3.6   CHLORIDE 109 112* 107   CO2 19* 20 18*   BUN 19 19 24   CR 0.48* 0.57 0.61   ANIONGAP 6 8 11   MARIELENA 8.8 8.7 9.0   * 85 183*   ALBUMIN 2.0* 2.0* 2.1*   PROTTOTAL 6.1* 6.0* 6.4*   BILITOTAL 1.9* 1.5* 1.9*   ALKPHOS 672* 671* 758*   ALT 77* 90* 89*   AST 67* 93* 69*     Recent Results (from the past 24 hour(s))   CT Abdomen Pelvis w  Contrast    Narrative    EXAMINATION: CT ABDOMEN PELVIS W CONTRAST, 5/8/2021 8:50 AM    TECHNIQUE:  Helical CT images from the lung bases through the  symphysis pubis were obtained with contrast.  Coronal reformatted  images were generated at a workstation for further assessment.    Contrast: iopamidol (ISOVUE-370) solution 95 mL        COMPARISON: CT 4/29/2021 and MRI of 4/22/2021    HISTORY: Abdominal abscess/infection suspected    FINDINGS:    Abdomen and pelvis:   Liver: Significantly cirrhotic morphology of the liver similar to  prior. Previously demonstrated multifocal dilatation of the biliary  ducts containing sludge and large communicating cystic areas in the  caudate lobe and segments 5 and 7 which are stable compared to CT from  4/29/2021 but increased in size compared to MRI from 4/22/2021. For  example cystic area in segment 7 measures 6.2 cm compared to 2.4 cm on  4/22/2021 MRI. Redemonstration of right hepatic biliary stent  terminating in the duodenum. Previous left hepatic stent removed.  Portal veins appear patent. New pneumobilia surrounding the biliary  stent as well as extending into the cystic duct. Distended gallbladder  with wall thickening and new development of air-fluid level.  Spleen: Normal size.  Pancreas: No suspicious pancreatic lesions. The pancreatic duct is not  dilated.  Adrenal glands: Left adrenal 1 cm nodule. Right adrenal gland is  unremarkable.  Kidneys: No kidney masses. No hydronephrosis or obstructing renal  stones.  Bladder / Pelvic organs: Unremarkable.  Bowel: No bowel distention. Mild wall thickening of the small bowel  and sigmoid colon likely relates to portal hypertensive  enteropathy/colopathy.  Lymph nodes: No retroperitoneal, mesenteric, or pelvic  lymphadenopathy.  Fluid: Diffuse large volume ascites increased from prior.  Vessels: No infrarenal aortic aneurysm. Diffuse portosystemic  collaterals.    Lung bases: No consolidation or pleural effusion.    Bones and  soft tissues: No suspicious osseous lesions.      Impression    IMPRESSION:   1.  Cirrhotic liver with multifocal dilatation of the biliary ducts  containing sludge and large communicating cystic areas in the caudate  lobe and segment 7, consistent with the known diagnosis of PSC.  The  large cystic areas in the caudate lobe, segment 5 and 7 may represent  cholangitic abscesses given the rapid increase in size from 4/22/2021.  The segment 5 and caudate lobe ducts appear not to communicate with  the stented right hepatic duct.   2.  Sequela of portal portal hypertension including portosystemic  collaterals and increased large volume ascites.  3.  Right hepatic biliary stent in place. Previous left hepatic duct  stent removed. Interval development of pneumobilia and air within  gallbladder likely relates to the recent endoscopic procedure.  4.  Indeterminate left adrenal nodule, characterized as cyst on prior  MRI.     I have personally reviewed the examination and initial interpretation  and I agree with the findings.    MARTINEZ JAVED MD     Medications     - MEDICATION INSTRUCTIONS -         carvedilol  6.25 mg Oral BID w/meals     cefTRIAXone  2 g Intravenous Q24H     furosemide  20 mg Oral Daily     heparin lock flush  5-10 mL Intracatheter Q24H     mesalamine  2,400 mg Oral Daily with breakfast     metroNIDAZOLE  500 mg Oral TID     multivitamin w/minerals  1 tablet Oral Daily     potassium chloride ER  10 mEq Oral At Bedtime     simvastatin  20 mg Oral At Bedtime     sodium chloride (PF)  3 mL Intracatheter Q8H     spironolactone  50 mg Oral Daily     ursodiol  300 mg Oral BID

## 2021-05-08 NOTE — PLAN OF CARE
"Vitals: BP 98/67 (BP Location: Left arm)   Pulse 75   Temp 98.8  F (37.1  C) (Oral)   Resp 16   Ht 1.651 m (5' 5\")   Wt 70.3 kg (155 lb)   SpO2 97%   BMI 25.79 kg/m      Shift: 7429-4685  VS: Soft pressures, OVSS on RA. Afebrile   Neuro: A&Ox4  Labs: wbc 16.4  Respiratory: WDL  Cardiac: WDL  Pain/Nausea/PRN: Denies pain, denies nausea. methylprednisolone given at 0600 (2 hrs prior to CT due to allergy per order)  Diet: Regular diet + ensure   LDA: PIV SL   Gtt/IVF: N/A  GI/: voiding not saving, LBM 5/7  Skin: Jaundice, abdomen is distended. Para site with dx, CDI  Mobility: up ad indy   Plan: abdominal CT 5/8    Will continue with plan of care and update team with any changes.     "

## 2021-05-08 NOTE — PROCEDURES
[facility]    Double Lumen PICC Placement    Date/Time: 5/8/2021 9:06 AM  Performed by: Enrique Zapata RN  Authorized by: Ruben Hay DO   Indications: Antibiotic.    UNIVERSAL PROTOCOL   Site Marked: Yes  Prior Images Obtained and Reviewed:  Yes  Required items: Required blood products, implants, devices and special equipment available    Patient identity confirmed:  Verbally with patient, arm band, provided demographic data and hospital-assigned identification number  NA - No sedation, light sedation, or local anesthesia  Confirmation Checklist:  Patient's identity using two indicators, relevant allergies, procedure was appropriate and matched the consent or emergent situation and correct equipment/implants were available  Time out: Immediately prior to the procedure a time out was called    Universal Protocol: the Joint Commission Universal Protocol was followed    Preparation: Patient was prepped and draped in usual sterile fashion           ANESTHESIA    Anesthesia: See MAR for details  Local Anesthetic:  Lidocaine 1% without epinephrine  Anesthetic Total (mL):  1      SEDATION    Patient Sedated: No        Preparation: skin prepped with ChloraPrep  Skin prep agent: skin prep agent completely dried prior to procedure  Sterile barriers: maximum sterile barriers were used: cap, mask, sterile gown, sterile gloves, and large sterile sheet  Hand hygiene: hand hygiene performed prior to central venous catheter insertion  Type of line used: Power PICC  Catheter type: double lumen  Lumen type: non-valved  Catheter size: 5 Fr  Brand: Bard  Lot number: [id number]  Placement method: venipuncture, MST, ultrasound and tip confirmation system  Number of attempts: 1  Successful placement: yes  Orientation: right  Location: brachial vein (lateral) (vein diameter - 0.36 cm)  Arm circumference: adults 10 cm  Extremity circumference: 23  Visible catheter length: 2  Total  catheter length: 42  Dressing and securement: chlorhexidine disc applied, glue, securement device, site cleaned and statlock  Post procedure assessment: blood return through all ports and free fluid flow (placement verified by [facility] 3CG)  PROCEDURE   Patient Tolerance:  Patient tolerated the procedure well with no immediate complications  Describe Procedure: PICC tip is in satisfactory location as verified by [employer] [facility] 3[facility] Tip Confirmation System. PICC is OK to use.

## 2021-05-09 ENCOUNTER — ANESTHESIA EVENT (OUTPATIENT)
Dept: SURGERY | Facility: CLINIC | Age: 56
DRG: 444 | End: 2021-05-09
Payer: COMMERCIAL

## 2021-05-09 ENCOUNTER — HOME INFUSION (PRE-WILLOW HOME INFUSION) (OUTPATIENT)
Dept: PHARMACY | Facility: CLINIC | Age: 56
End: 2021-05-09

## 2021-05-09 LAB
ALBUMIN SERPL-MCNC: 1.9 G/DL (ref 3.4–5)
ALP SERPL-CCNC: 538 U/L (ref 40–150)
ALT SERPL W P-5'-P-CCNC: 69 U/L (ref 0–50)
ANION GAP SERPL CALCULATED.3IONS-SCNC: 6 MMOL/L (ref 3–14)
AST SERPL W P-5'-P-CCNC: 62 U/L (ref 0–45)
BACTERIA SPEC CULT: NO GROWTH
BACTERIA SPEC CULT: NO GROWTH
BASOPHILS # BLD AUTO: 0 10E9/L (ref 0–0.2)
BASOPHILS NFR BLD AUTO: 0.2 %
BILIRUB SERPL-MCNC: 1.3 MG/DL (ref 0.2–1.3)
BUN SERPL-MCNC: 17 MG/DL (ref 7–30)
CALCIUM SERPL-MCNC: 8.7 MG/DL (ref 8.5–10.1)
CHLORIDE SERPL-SCNC: 112 MMOL/L (ref 94–109)
CO2 SERPL-SCNC: 20 MMOL/L (ref 20–32)
CREAT SERPL-MCNC: 0.45 MG/DL (ref 0.52–1.04)
DIFFERENTIAL METHOD BLD: ABNORMAL
EOSINOPHIL # BLD AUTO: 0 10E9/L (ref 0–0.7)
EOSINOPHIL NFR BLD AUTO: 0.1 %
ERYTHROCYTE [DISTWIDTH] IN BLOOD BY AUTOMATED COUNT: 16.9 % (ref 10–15)
GFR SERPL CREATININE-BSD FRML MDRD: >90 ML/MIN/{1.73_M2}
GLUCOSE SERPL-MCNC: 87 MG/DL (ref 70–99)
HCT VFR BLD AUTO: 31 % (ref 35–47)
HGB BLD-MCNC: 10.1 G/DL (ref 11.7–15.7)
IMM GRANULOCYTES # BLD: 0.2 10E9/L (ref 0–0.4)
IMM GRANULOCYTES NFR BLD: 0.9 %
INR PPP: 1.41 (ref 0.86–1.14)
LYMPHOCYTES # BLD AUTO: 1.6 10E9/L (ref 0.8–5.3)
LYMPHOCYTES NFR BLD AUTO: 7.5 %
Lab: NORMAL
Lab: NORMAL
MCH RBC QN AUTO: 30.5 PG (ref 26.5–33)
MCHC RBC AUTO-ENTMCNC: 32.6 G/DL (ref 31.5–36.5)
MCV RBC AUTO: 94 FL (ref 78–100)
MONOCYTES # BLD AUTO: 1.5 10E9/L (ref 0–1.3)
MONOCYTES NFR BLD AUTO: 7 %
NEUTROPHILS # BLD AUTO: 18.4 10E9/L (ref 1.6–8.3)
NEUTROPHILS NFR BLD AUTO: 84.3 %
NRBC # BLD AUTO: 0 10*3/UL
NRBC BLD AUTO-RTO: 0 /100
PLATELET # BLD AUTO: 263 10E9/L (ref 150–450)
POTASSIUM SERPL-SCNC: 3.8 MMOL/L (ref 3.4–5.3)
PROT SERPL-MCNC: 5.8 G/DL (ref 6.8–8.8)
RBC # BLD AUTO: 3.31 10E12/L (ref 3.8–5.2)
SODIUM SERPL-SCNC: 138 MMOL/L (ref 133–144)
SPECIMEN SOURCE: NORMAL
SPECIMEN SOURCE: NORMAL
WBC # BLD AUTO: 21.9 10E9/L (ref 4–11)

## 2021-05-09 PROCEDURE — 250N000011 HC RX IP 250 OP 636: Performed by: INTERNAL MEDICINE

## 2021-05-09 PROCEDURE — 120N000011 HC R&B TRANSPLANT UMMC

## 2021-05-09 PROCEDURE — 250N000013 HC RX MED GY IP 250 OP 250 PS 637: Performed by: STUDENT IN AN ORGANIZED HEALTH CARE EDUCATION/TRAINING PROGRAM

## 2021-05-09 PROCEDURE — 250N000013 HC RX MED GY IP 250 OP 250 PS 637: Performed by: INTERNAL MEDICINE

## 2021-05-09 PROCEDURE — 99232 SBSQ HOSP IP/OBS MODERATE 35: CPT | Performed by: STUDENT IN AN ORGANIZED HEALTH CARE EDUCATION/TRAINING PROGRAM

## 2021-05-09 PROCEDURE — 85610 PROTHROMBIN TIME: CPT | Performed by: INTERNAL MEDICINE

## 2021-05-09 PROCEDURE — 250N000011 HC RX IP 250 OP 636: Performed by: STUDENT IN AN ORGANIZED HEALTH CARE EDUCATION/TRAINING PROGRAM

## 2021-05-09 PROCEDURE — 80053 COMPREHEN METABOLIC PANEL: CPT | Performed by: INTERNAL MEDICINE

## 2021-05-09 PROCEDURE — 85025 COMPLETE CBC W/AUTO DIFF WBC: CPT | Performed by: INTERNAL MEDICINE

## 2021-05-09 PROCEDURE — 99207 PR CDG-CUT & PASTE-POTENTIAL IMPACT ON LEVEL: CPT | Performed by: STUDENT IN AN ORGANIZED HEALTH CARE EDUCATION/TRAINING PROGRAM

## 2021-05-09 PROCEDURE — 36592 COLLECT BLOOD FROM PICC: CPT | Performed by: INTERNAL MEDICINE

## 2021-05-09 PROCEDURE — 250N000013 HC RX MED GY IP 250 OP 250 PS 637: Performed by: NURSE PRACTITIONER

## 2021-05-09 PROCEDURE — 99233 SBSQ HOSP IP/OBS HIGH 50: CPT | Performed by: STUDENT IN AN ORGANIZED HEALTH CARE EDUCATION/TRAINING PROGRAM

## 2021-05-09 RX ORDER — CARVEDILOL 6.25 MG/1
6.25 TABLET ORAL 2 TIMES DAILY WITH MEALS
Qty: 60 TABLET | Refills: 3 | Status: SHIPPED | OUTPATIENT
Start: 2021-05-09 | End: 2021-06-21

## 2021-05-09 RX ORDER — CEFTRIAXONE 2 G/1
2 INJECTION, POWDER, FOR SOLUTION INTRAMUSCULAR; INTRAVENOUS EVERY 24 HOURS
Qty: 340 ML | Refills: 0 | Status: SHIPPED | OUTPATIENT
Start: 2021-05-09 | End: 2021-05-11

## 2021-05-09 RX ORDER — METRONIDAZOLE 500 MG/1
500 TABLET ORAL 3 TIMES DAILY
Qty: 51 TABLET | Refills: 0 | Status: SHIPPED | OUTPATIENT
Start: 2021-05-09 | End: 2021-05-11

## 2021-05-09 RX ADMIN — POTASSIUM CHLORIDE 10 MEQ: 750 CAPSULE, EXTENDED RELEASE ORAL at 22:48

## 2021-05-09 RX ADMIN — CEFTRIAXONE 2 G: 2 INJECTION, POWDER, FOR SOLUTION INTRAMUSCULAR; INTRAVENOUS at 11:51

## 2021-05-09 RX ADMIN — Medication 5 ML: at 06:40

## 2021-05-09 RX ADMIN — SIMVASTATIN 20 MG: 5 TABLET, FILM COATED ORAL at 22:48

## 2021-05-09 RX ADMIN — METRONIDAZOLE 500 MG: 500 TABLET ORAL at 20:10

## 2021-05-09 RX ADMIN — METRONIDAZOLE 500 MG: 500 TABLET ORAL at 14:35

## 2021-05-09 RX ADMIN — Medication 1 TABLET: at 07:29

## 2021-05-09 RX ADMIN — CARVEDILOL 6.25 MG: 6.25 TABLET, FILM COATED ORAL at 07:30

## 2021-05-09 RX ADMIN — URSODIOL 300 MG: 300 CAPSULE ORAL at 20:10

## 2021-05-09 RX ADMIN — SPIRONOLACTONE 50 MG: 50 TABLET, FILM COATED ORAL at 07:30

## 2021-05-09 RX ADMIN — URSODIOL 300 MG: 300 CAPSULE ORAL at 07:30

## 2021-05-09 RX ADMIN — MESALAMINE 2400 MG: 1.2 TABLET, DELAYED RELEASE ORAL at 07:30

## 2021-05-09 RX ADMIN — CARVEDILOL 6.25 MG: 6.25 TABLET, FILM COATED ORAL at 18:04

## 2021-05-09 RX ADMIN — METRONIDAZOLE 500 MG: 500 TABLET ORAL at 07:30

## 2021-05-09 RX ADMIN — DOCUSATE SODIUM 50 MG AND SENNOSIDES 8.6 MG 2 TABLET: 8.6; 5 TABLET, FILM COATED ORAL at 07:39

## 2021-05-09 RX ADMIN — FUROSEMIDE 20 MG: 20 TABLET ORAL at 07:30

## 2021-05-09 ASSESSMENT — ACTIVITIES OF DAILY LIVING (ADL)
ADLS_ACUITY_SCORE: 12
ADLS_ACUITY_SCORE: 14
ADLS_ACUITY_SCORE: 13
ADLS_ACUITY_SCORE: 14

## 2021-05-09 NOTE — PLAN OF CARE
"Vitals: /72 (BP Location: Left arm)   Pulse 62   Temp 97.8  F (36.6  C) (Oral)   Resp 16   Ht 1.651 m (5' 5\")   Wt 70.3 kg (155 lb)   SpO2 96%   BMI 25.79 kg/m      Shift: 4032-8297  VS: VSS on RA. Afebrile.   Neuro: A&Ox4  Labs: ascites cultures negative so far  Respiratory: WDL  Cardiac: WDL  PCardiac: WDL  Pain/Nausea/PRN: Denies pain, denies nausea.   Diet: Regular diet + ensure   LDA: PIV SL, PICC SL   Gtt/IVF: N/A  GI/: voiding not saving, LBM 5/8  Skin: Jaundice, abdomen is distended. Para sites with dx, CDI  Mobility: up ad indy   Plan: possible discharge home on IV abx     Will continue with plan of care and update team with any changes.      "

## 2021-05-09 NOTE — PROGRESS NOTES
Transition Planning Update    D:  Update from MD team stating Ms. Nava will discharge today.  [facility] was updated and will plan to admit patient to home care services tomorrow.  Patient to receive her daily ceftriaxone prior to discharge today.  A/P:  Patient will discharge to home today and will be followed by the above skilled home care agency.  Patient will follow up in clinic as designated in discharge orders.  Care Coordinator RN is available to assist with updated transition needs prn.    Kayla Jimenez,  B.S.N., R.N., P.H.N..  Care Coordinator     Pager /Weekend on call Pager-.  [facility] [place]

## 2021-05-09 NOTE — PLAN OF CARE
Vitally stable, denies pain or nausea. Good appetite on regular diet. Up independently in room and halls. Voiding adequately, reporting several small BMs. Awaiting IR assessment for possible abscess drainage.

## 2021-05-09 NOTE — PROGRESS NOTES
RNCC received a page that as per updated note from MD, patient will not be discharging until tomorrow due to a medical procedure.    Wilfredo WRIGHTCC, MSN

## 2021-05-09 NOTE — PROGRESS NOTES
[facility]    Medicine Progress Note - Hospitalist Service, Gold 11    Pt is a 55 year old with hx of PSC with cirrhosis, biliary stents in place, ascites, UC on mesalamine, pancreatic mucinous cystadenoma, undergoing liver transplant evaluation who presented to [facility] with septic shock due to S. Mitis bacteremia and SBP. Stable on Zosyn, off pressors since 5/1, BCx NGTD since 5/1. Now admitted to [facility] 5/03 for further care. Transitioned to ceftriaxone for strep mitis bacteremia. Underwent ERCP 5/5 w/ one stent removed, one stent placed and large amounts of stone and pus removed suggestive of cholangitis. Also noted to have possible ~ 2 cm abscess/collection off of the left intrahepatic that could not be accessed. ID consulted and recommended a repeat RUQ US which showed some decrease in collections noted prior. Planning on discharging with IV abx with follow up imaging.       Date of Admission:  5/3/2021  Assessment & Plan      Acute decompensated cirrhosis likely secondary to SBP  Primary sclerosing cholangitis with chronic cirrhosis  Recurrent ascites  Grade III esophageal varices not banded  Ascending cholangitis w/ possible hepatic abscess   Admitted to [facility] 4/29 with septic shock from strep mitis bacteremia and SBP. Paracentesis 4/29 with 1610 WBCs, and 60% neutrophils.  She was stabilized with fluids and abx and transferred to [facility]. Underwent ERCP 5/5 w/ one stent removed, one stent placed and large amounts of stone and pus removed suggestive of cholangitis. Also noted to have possible ~ 2 cm abscess/collection off of the left intrahepatic that could not be accessed. ID consulted and recommended a repeat RUQ US which showed some decrease in collections noted prior. Planning on discharging with IV abx with follow up imaging.  - Hepatology consulted for further transplant workup  - ID consulted thanks for the recs  - Lasix 20 mg daily, spironolactone 50  mg daily.  -Daily MELD labs.  - Continue Ceftriaxone 2 gQ24 H + Flagyl 500 mg Q8H  - Plan for minimum two weeks IV Ceftriaxone than RUQ US to determine further length for hepatic abscess will talk with ID about follow up  - SBP ppx after abx course   - Discussed with GI and will try and perform another ERCP on Monday to drain abscess     Spontaneous bacterial peritonitis  Septic shock, resolved  Strep mitis bacteremia  Require Levophed for short duration at [facility] and now [facility]. TTE w/o signs of vegetations on valves. CT dental w/o sign of abscess. Initial plan was for ceftriaxone for 10-14 days to cover the bacteremia but now with her possible abscess ID consulted for assistance. See above for abx plan.  - ID consulted thanks for the recs  - Continue Ceftriaxone 2 gQ24 H + Flagyl 500 mg Q8H     Possible hematochezia  Patient noted some blood in the toilet 2 days prior to transfer but has had none since  -As needed senna and Dulcolax.     GONZALO, resolved  -Likely related to septic shock.    NAGMA  Started on initial presentation to [facility] due to GONZALO. Has remained stable will continue to monitor and workup further if bicarb continues to drop. No diarrhea. Could consider workup for RTA if persistent.  - BMP daily     Ulcerative colitis  -Continue PTA mesalamine.     Severe malnutrition in the context of acute on chronic illness  -Nutrition consult.     Hypertension  -Hold PTA lisinopril restart nadolol given varices     Hyperlipidemia  -Hold PTA statin.       Diet: Snacks/Supplements Adult: Other; Ensure Enlive Shake (chocolate) at HS snack time; Between Meals  Regular Diet Adult  Diet  NPO per Anesthesia Guidelines for Procedure/Surgery Except for: Meds    DVT Prophylaxis: Pneumatic Compression Devices  Jurado Catheter: not present  Code Status: Full Code           Disposition Plan   Expected discharge: tomorrow, recommended to prior living arrangement once antibiotic plan established and  safe disposition plan/ TCU bed available.  Entered: Ruben Hay DO 05/09/2021, 12:38 PM       The patient's care was discussed with the Bedside Nurse, Patient and hepatology, panc/bili Consultant.    Ruben Hay DO  Hospitalist Service, [address]  Contact information available via [facility] Paging/Directory  Please see sign in/sign out for up to date coverage information  ______________________________________________________________________    Interval History     No acute events overnight. Doing well today we had discussed going home but GI to perform ERCP tomorrow. Patient ok with this.    Four point ROS completed and otherwise negative    Data reviewed today: I reviewed all medications, new labs and imaging results over the last 24 hours. I personally reviewed no images or EKG's today.    Physical Exam   Vital Signs: Temp: 97.8  F (36.6  C) Temp src: Oral BP: 116/71 Pulse: 63   Resp: 16 SpO2: 98 % O2 Device: None (Room air)    Weight: 155 lbs 0 oz    Gen: awake and alert, appears comfortable, appears stated age  HEENT: NCAT, scleral icterus present  CV: regular rate with regular rhythm, S1/S2, extremities warm and well perfused, trace bilateral lower extremity 1+ edema  Pulm: normal work of breathing, lungs clear to auscultation, no crackles or wheezing  GI: Nontender, abdomen is more distended but soft and not painful  Skin: warm, diffusely jaundiced  Neuro: Aox3, speech normal, moves all extremities symmetrically and equally, no asterixis or clonus  Psych: mood is good, affect is congruent    Data   Recent Labs   Lab 05/09/21  0649 05/08/21  0533 05/07/21  0543   WBC 21.9* 11.1* 16.4*   HGB 10.1* 10.3* 10.0*   MCV 94 91 93    212 207   INR 1.41* 1.48* 1.36*    134 139   POTASSIUM 3.8 4.1 3.8   CHLORIDE 112* 109 112*   CO2 20 19* 20   BUN 17 19 19   CR 0.45* 0.48* 0.57   ANIONGAP 6 6 8   MARIELENA 8.7 8.8 8.7   GLC 87 136* 85   ALBUMIN 1.9* 2.0* 2.0*    PROTTOTAL 5.8* 6.1* 6.0*   BILITOTAL 1.3 1.9* 1.5*   ALKPHOS 538* 672* 671*   ALT 69* 77* 90*   AST 62* 67* 93*     No results found for this or any previous visit (from the past 24 hour(s)).  Medications     - MEDICATION INSTRUCTIONS -         carvedilol  6.25 mg Oral BID w/meals     cefTRIAXone  2 g Intravenous Q24H     furosemide  20 mg Oral Daily     heparin lock flush  5-10 mL Intracatheter Q24H     mesalamine  2,400 mg Oral Daily with breakfast     metroNIDAZOLE  500 mg Oral TID     multivitamin w/minerals  1 tablet Oral Daily     potassium chloride ER  10 mEq Oral At Bedtime     simvastatin  20 mg Oral At Bedtime     sodium chloride (PF)  3 mL Intracatheter Q8H     spironolactone  50 mg Oral Daily     ursodiol  300 mg Oral BID

## 2021-05-09 NOTE — PROGRESS NOTES
"[facility]  HEPATOLOGY PROGRESS NOTE  Berta Nava [id number]       CC: fevers, lethargy  SUBJECTIVE:  Feels well, WBC increased 10 -> 21 but LFTs downtrending. CT scan showing ~ 6 cm fluid collection.     ROS:  A 10-point review of systems was negative.    OBJECTIVE:  VS: /65 (BP Location: Left arm)   Pulse 67   Temp 98.1  F (36.7  C) (Oral)   Resp 16   Ht 1.651 m (5' 5\")   Wt 70.3 kg (155 lb)   SpO2 99%   BMI 25.79 kg/m       Gross per 24 hour   Intake 480 ml   Output --   Net 480 ml     General: In no acute distress, mild facial muscle wasting  Neuro: AOx3, No asterixis  HEENT:  Noscleral icterus, Nooral lesions  Abd:  Mildly distended, nontender    Extrem: Noperipheral edema   Skin: mild jaundice  Psych:affect and mood normal    MEDICATIONS:  Current Facility-Administered Medications   Medication     bisacodyl (DULCOLAX) Suppository 10 mg     carvedilol (COREG) tablet 6.25 mg     cefTRIAXone (ROCEPHIN) 2 g vial to attach to  ml bag for ADULTS or NS 50 ml bag for PEDS     furosemide (LASIX) tablet 20 mg     heparin lock flush 10 UNIT/ML injection 2-5 mL     heparin lock flush 10 UNIT/ML injection 5-10 mL     heparin lock flush 10 UNIT/ML injection 5-10 mL     lidocaine (LMX4) cream     lidocaine (LMX4) cream     lidocaine 1 % 0.1-1 mL     lidocaine 1 % 0.1-5 mL     May continue current IV fluids if patient has IV fluids infusing.     melatonin tablet 3 mg     mesalamine (LIALDA) EC tablet 2,400 mg     metroNIDAZOLE (FLAGYL) tablet 500 mg     multivitamin w/minerals (THERA-VIT-M) tablet 1 tablet     ondansetron (ZOFRAN-ODT) ODT tab 4 mg    Or     ondansetron (ZOFRAN) injection 4 mg     polyethylene glycol (MIRALAX) Packet 17 g     potassium chloride ER (MICRO-K) CR capsule 10 mEq     senna-docusate (SENOKOT-S/PERICOLACE) 8.6-50 MG per tablet 2 tablet     simvastatin (ZOCOR) tablet 20 mg     sodium chloride (PF) 0.9% PF flush 10-20 mL     sodium chloride (PF) 0.9% PF flush 3 mL     sodium " chloride (PF) 0.9% PF flush 3 mL     sodium chloride (PF) 0.9% PF flush 3 mL     sodium chloride (PF) 0.9% PF flush 5-50 mL     spironolactone (ALDACTONE) tablet 50 mg     ursodiol (ACTIGALL) capsule 300 mg       REVIEW OF LABORATORY, PATHOLOGY AND IMAGING RESULTS:  BMP  Recent Labs   Lab 05/09/21  0649 05/08/21  0533 05/07/21  0543 05/06/21  0544    134 139 136   POTASSIUM 3.8 4.1 3.8 3.6   CHLORIDE 112* 109 112* 107   MARIELENA 8.7 8.8 8.7 9.0   CO2 20 19* 20 18*   BUN 17 19 19 24   CR 0.45* 0.48* 0.57 0.61   GLC 87 136* 85 183*     CBC  Recent Labs   Lab 05/09/21 0649 05/08/21  0533 05/07/21  0543 05/06/21  0544   WBC 21.9* 11.1* 16.4* 14.8*   RBC 3.31* 3.49* 3.30* 3.38*   HGB 10.1* 10.3* 10.0* 10.3*   HCT 31.0* 31.6* 30.6* 31.6*   MCV 94 91 93 94   MCH 30.5 29.5 30.3 30.5   MCHC 32.6 32.6 32.7 32.6   RDW 16.9* 16.7* 16.7* 16.5*    212 207 168     INR  Recent Labs   Lab 05/09/21  0649 05/08/21  0533 05/07/21  0543 05/06/21  0544   INR 1.41* 1.48* 1.36* 1.46*     LFTs  Recent Labs   Lab 05/09/21  0649 05/08/21  0533 05/07/21  0543 05/06/21  0544   ALKPHOS 538* 672* 671* 758*   AST 62* 67* 93* 69*   ALT 69* 77* 90* 89*   BILITOTAL 1.3 1.9* 1.5* 1.9*   PROTTOTAL 5.8* 6.1* 6.0* 6.4*   ALBUMIN 1.9* 2.0* 2.0* 2.1*      PANCNo lab results found in last 7 days.   MELD-Na score: 11 at 5/9/2021  6:49 AM  MELD score: 11 at 5/9/2021  6:49 AM  Calculated from:  Serum Creatinine: 0.45 mg/dL (Rounded to 1 mg/dL) at 5/9/2021  6:49 AM  Serum Sodium: 138 mmol/L (Rounded to 137 mmol/L) at 5/9/2021  6:49 AM  Total Bilirubin: 1.3 mg/dL at 5/9/2021  6:49 AM  INR(ratio): 1.41 at 5/9/2021  6:49 AM  Age: 55 years 8 months      Imaging Results:  ERCP 5/5/21  Impression:          - One partially occluded stent from the biliary tree was                        seen in the major papilla. Removed.                        - Cholangiogram showed diffusely irregular biliary tree                        with paucity/loss of multiple tertiary  branches                        consistent with PSC/cirrhosis. Multiple filling defects                        throught the biliary tree consistent with stones/sludge                        particularly in the left intrahepatic. There appeared to                        be a ~2 cm collection/abscess off of the left                        intrahepatic. Attempts to gain wire access to this                        collection were unsuccessful.                        - Cystic duct/gallbladder opacified                        - Stone clearance was achieved with balloon dilation and                        balloon/basket sweeping. Large amount of stone,                        sludge/cast-like material with associated pus removed                        - One 10 Fr x 20 cm Johlin stent placed into the left                        intrahepatic biliary tree                        - Grade III esophageal varices. Not banded at this time                        due to concern that early repeat ERCP intervention could                        be needed for recurrent cholangitis     US abd 5/7/21  IMPRESSION:  1. Cirrhotic changes of the liver.  2. Complex septated cystic lesion in the right hepatic lobe appears  slightly smaller in size from previous exam.  3. The gallbladder appears enlarged with wall thickening and  intraluminal sludge likely related to underlying liver disease. No  focal tenderness to suggest acute cholecystitis. A common bile duct  stent is visualized on today's exam.  3. Moderate volume ascites, slightly increased from previous exam.    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 She was started on Zosyn and transitioned to ceftriaxone. Course complicated by liver abscess, doing well but WBC rising.    RECOMMENDATIONS:  1. Strep mitis bacteremia with sepsis  - strep mitis + on 4/29, follow up negative 5/1, 5/3. pansensitive  - has been on zosyn since 4/29-5/3. Ceftriaxone started 5/4  - more stable VS, did require pressor support at OSH  - echocardiogram negative for vegetations  -  Dental CT with caries, advised she needs to visit with dentist following discharge. No abscess.       2. SBP  3. Ascites  - initial para on 4/29 with 1094 PMN's, culture negative. Received 75 gm Albumin on 4/29, 50 gms on 5/3.   - now on ceftriaxone Will need SBP ppx with ciprofloxacin 500 mg daily when abx completed. Followed by ID and will be receiving IV abx at home.   - para as needed- has outpatient scheduled next week.   - has been on low dose diuretics with lasix 20 mg daily and spironolactone 50 mg daily.      4. PSC  5. Biliary strictures/stones  - will need mammogram as outpatient to complete tx eval.   - has been ursodiol 300 mg BID  - in transplant evaluation, MELD 12, ABO AB  - US 5/4 without evidence of HCC    6. Hepatic abscess  - ERCP 5/5 with paritally occluded stent, duct swept with casts and stones.  Found to have hepatic abscess with internal drainage achieved during ERCP. Plan for repeat ERCP in 1 month. US showing improvement in abscess size, but large on follow up CT scan. WBC rising. IR consulted for percutaneous drainage - not recommended given location that would require long parenchymal tract, also would come with risk of infection seeding ascites. Would not recommend percutaneous drainage unless other options exhausted given this. Recommend tentative ERCP 5/10 for further attempt at internal drainage given rising WBC count and CT findings.   - Abx per ID    7. Esophageal varices  - EGD 12/1/20 with gr II-III EV. Not banded  at that time. Changed nadolol to carvedilol 6.25 BID. If continuing to have issues with ascites requiring frequent taps, may need to stop NSBB  - large gr III EV seen on EGD, not banded.   - no current signs of bleeding.      8. Immunizations  - please start HBV/HAV vaccination prior to discharge.     9. HTN  - Improved with progression of cirrhosis, would stop lisinopril indefinitely as BP controlled off of this and concern for GONZALO in patients with ascites.     Patient was discussed with attending physician, Dr. Emma Mao    Next follow up appointment: Dr. Leventhal 6/21    Thank you for the opportunity to be involved with  Berta AYE [facility]. Please call with any questions or concerns.    Emma Mao MD  Hepatology

## 2021-05-09 NOTE — PLAN OF CARE
Vitals: 116/71, HR 63, 97.8, 98% RA.  Endocrine: n/a  Labs: Elevated WBC 21 from 14.  Pain: Patient denies pain.  PRN's: n/a  Diet: Regular diet, fair appetite.  LDA: DL PICC in place for IV antibiotic.  GI: BM 5/8, took Senna this morning.  : Voids spontaneously.  Skin: Abdomen soft but distended, last paracentesis on Friday.  Neuro: Pleasant, A&O.  Mobility: Up ad indy, showered.  Education: n/a  Plan: Discharge delayed for ERCP tomorrow, NPO after midnight.

## 2021-05-10 ENCOUNTER — APPOINTMENT (OUTPATIENT)
Dept: GENERAL RADIOLOGY | Facility: CLINIC | Age: 56
DRG: 444 | End: 2021-05-10
Attending: STUDENT IN AN ORGANIZED HEALTH CARE EDUCATION/TRAINING PROGRAM
Payer: COMMERCIAL

## 2021-05-10 ENCOUNTER — ANESTHESIA (OUTPATIENT)
Dept: SURGERY | Facility: CLINIC | Age: 56
DRG: 444 | End: 2021-05-10
Payer: COMMERCIAL

## 2021-05-10 LAB
ALBUMIN SERPL-MCNC: 2 G/DL (ref 3.4–5)
ALP SERPL-CCNC: 529 U/L (ref 40–150)
ALT SERPL W P-5'-P-CCNC: 67 U/L (ref 0–50)
ANION GAP SERPL CALCULATED.3IONS-SCNC: 9 MMOL/L (ref 3–14)
AST SERPL W P-5'-P-CCNC: 69 U/L (ref 0–45)
BASOPHILS # BLD AUTO: 0 10E9/L (ref 0–0.2)
BASOPHILS NFR BLD AUTO: 0.2 %
BILIRUB SERPL-MCNC: 1.8 MG/DL (ref 0.2–1.3)
BUN SERPL-MCNC: 21 MG/DL (ref 7–30)
CALCIUM SERPL-MCNC: 9 MG/DL (ref 8.5–10.1)
CHLORIDE SERPL-SCNC: 112 MMOL/L (ref 94–109)
CO2 SERPL-SCNC: 18 MMOL/L (ref 20–32)
CREAT SERPL-MCNC: 0.51 MG/DL (ref 0.52–1.04)
DIFFERENTIAL METHOD BLD: ABNORMAL
EOSINOPHIL # BLD AUTO: 0.2 10E9/L (ref 0–0.7)
EOSINOPHIL NFR BLD AUTO: 1.3 %
ERCP: NORMAL
ERYTHROCYTE [DISTWIDTH] IN BLOOD BY AUTOMATED COUNT: 17.5 % (ref 10–15)
GFR SERPL CREATININE-BSD FRML MDRD: >90 ML/MIN/{1.73_M2}
GLUCOSE SERPL-MCNC: 85 MG/DL (ref 70–99)
HCT VFR BLD AUTO: 34.6 % (ref 35–47)
HGB BLD-MCNC: 11.5 G/DL (ref 11.7–15.7)
IMM GRANULOCYTES # BLD: 0.1 10E9/L (ref 0–0.4)
IMM GRANULOCYTES NFR BLD: 1.1 %
INR PPP: 1.38 (ref 0.86–1.14)
LABORATORY COMMENT REPORT: NORMAL
LYMPHOCYTES # BLD AUTO: 1.7 10E9/L (ref 0.8–5.3)
LYMPHOCYTES NFR BLD AUTO: 12.9 %
MCH RBC QN AUTO: 31.1 PG (ref 26.5–33)
MCHC RBC AUTO-ENTMCNC: 33.2 G/DL (ref 31.5–36.5)
MCV RBC AUTO: 94 FL (ref 78–100)
MONOCYTES # BLD AUTO: 1.2 10E9/L (ref 0–1.3)
MONOCYTES NFR BLD AUTO: 9 %
NEUTROPHILS # BLD AUTO: 10 10E9/L (ref 1.6–8.3)
NEUTROPHILS NFR BLD AUTO: 75.5 %
NRBC # BLD AUTO: 0 10*3/UL
NRBC BLD AUTO-RTO: 0 /100
PLATELET # BLD AUTO: 293 10E9/L (ref 150–450)
POTASSIUM SERPL-SCNC: 4 MMOL/L (ref 3.4–5.3)
PROT SERPL-MCNC: 6.2 G/DL (ref 6.8–8.8)
RBC # BLD AUTO: 3.7 10E12/L (ref 3.8–5.2)
SARS-COV-2 RNA RESP QL NAA+PROBE: NEGATIVE
SODIUM SERPL-SCNC: 139 MMOL/L (ref 133–144)
SPECIMEN SOURCE: NORMAL
WBC # BLD AUTO: 13.3 10E9/L (ref 4–11)

## 2021-05-10 PROCEDURE — U0003 INFECTIOUS AGENT DETECTION BY NUCLEIC ACID (DNA OR RNA); SEVERE ACUTE RESPIRATORY SYNDROME CORONAVIRUS 2 (SARS-COV-2) (CORONAVIRUS DISEASE [COVID-19]), AMPLIFIED PROBE TECHNIQUE, MAKING USE OF HIGH THROUGHPUT TECHNOLOGIES AS DESCRIBED BY CMS-2020-01-R: HCPCS | Performed by: INTERNAL MEDICINE

## 2021-05-10 PROCEDURE — 250N000013 HC RX MED GY IP 250 OP 250 PS 637: Performed by: INTERNAL MEDICINE

## 2021-05-10 PROCEDURE — 999N000181 XR SURGERY CARM FLUORO GREATER THAN 5 MIN W STILLS: Mod: TC

## 2021-05-10 PROCEDURE — 250N000009 HC RX 250: Performed by: NURSE ANESTHETIST, CERTIFIED REGISTERED

## 2021-05-10 PROCEDURE — 85025 COMPLETE CBC W/AUTO DIFF WBC: CPT | Performed by: INTERNAL MEDICINE

## 2021-05-10 PROCEDURE — 36415 COLL VENOUS BLD VENIPUNCTURE: CPT | Performed by: INTERNAL MEDICINE

## 2021-05-10 PROCEDURE — 99233 SBSQ HOSP IP/OBS HIGH 50: CPT | Mod: 25 | Performed by: INTERNAL MEDICINE

## 2021-05-10 PROCEDURE — 120N000011 HC R&B TRANSPLANT UMMC

## 2021-05-10 PROCEDURE — C1726 CATH, BAL DIL, NON-VASCULAR: HCPCS | Performed by: INTERNAL MEDICINE

## 2021-05-10 PROCEDURE — 710N000009 HC RECOVERY PHASE 1, LEVEL 1, PER MIN: Performed by: INTERNAL MEDICINE

## 2021-05-10 PROCEDURE — 80053 COMPREHEN METABOLIC PANEL: CPT | Performed by: INTERNAL MEDICINE

## 2021-05-10 PROCEDURE — 250N000011 HC RX IP 250 OP 636: Performed by: NURSE ANESTHETIST, CERTIFIED REGISTERED

## 2021-05-10 PROCEDURE — 99233 SBSQ HOSP IP/OBS HIGH 50: CPT | Performed by: NURSE PRACTITIONER

## 2021-05-10 PROCEDURE — 0FC58ZZ EXTIRPATION OF MATTER FROM RIGHT HEPATIC DUCT, VIA NATURAL OR ARTIFICIAL OPENING ENDOSCOPIC: ICD-10-PCS | Performed by: INTERNAL MEDICINE

## 2021-05-10 PROCEDURE — 85610 PROTHROMBIN TIME: CPT | Performed by: INTERNAL MEDICINE

## 2021-05-10 PROCEDURE — C1769 GUIDE WIRE: HCPCS | Performed by: INTERNAL MEDICINE

## 2021-05-10 PROCEDURE — U0005 INFEC AGEN DETEC AMPLI PROBE: HCPCS | Performed by: INTERNAL MEDICINE

## 2021-05-10 PROCEDURE — 370N000017 HC ANESTHESIA TECHNICAL FEE, PER MIN: Performed by: INTERNAL MEDICINE

## 2021-05-10 PROCEDURE — 0FPB8DZ REMOVAL OF INTRALUMINAL DEVICE FROM HEPATOBILIARY DUCT, VIA NATURAL OR ARTIFICIAL OPENING ENDOSCOPIC: ICD-10-PCS | Performed by: INTERNAL MEDICINE

## 2021-05-10 PROCEDURE — 999N000141 HC STATISTIC PRE-PROCEDURE NURSING ASSESSMENT: Performed by: INTERNAL MEDICINE

## 2021-05-10 PROCEDURE — 258N000003 HC RX IP 258 OP 636: Performed by: NURSE ANESTHETIST, CERTIFIED REGISTERED

## 2021-05-10 PROCEDURE — 360N000082 HC SURGERY LEVEL 2 W/ FLUORO, PER MIN: Performed by: INTERNAL MEDICINE

## 2021-05-10 PROCEDURE — 250N000011 HC RX IP 250 OP 636: Performed by: INTERNAL MEDICINE

## 2021-05-10 PROCEDURE — 250N000009 HC RX 250: Performed by: INTERNAL MEDICINE

## 2021-05-10 PROCEDURE — 272N000001 HC OR GENERAL SUPPLY STERILE: Performed by: INTERNAL MEDICINE

## 2021-05-10 PROCEDURE — 250N000025 HC SEVOFLURANE, PER MIN: Performed by: INTERNAL MEDICINE

## 2021-05-10 PROCEDURE — C1876 STENT, NON-COA/NON-COV W/DEL: HCPCS | Performed by: INTERNAL MEDICINE

## 2021-05-10 PROCEDURE — 0F758DZ DILATION OF RIGHT HEPATIC DUCT WITH INTRALUMINAL DEVICE, VIA NATURAL OR ARTIFICIAL OPENING ENDOSCOPIC: ICD-10-PCS | Performed by: INTERNAL MEDICINE

## 2021-05-10 PROCEDURE — 255N000002 HC RX 255 OP 636: Performed by: INTERNAL MEDICINE

## 2021-05-10 DEVICE — STENT JOHLIN PANCREA WEDGE 08.5FRX20CM WINTRO G26831
Type: IMPLANTABLE DEVICE | Site: BILE DUCT | Status: NON-FUNCTIONAL
Removed: 2021-06-10

## 2021-05-10 RX ORDER — HYDROMORPHONE HYDROCHLORIDE 1 MG/ML
.3-.5 INJECTION, SOLUTION INTRAMUSCULAR; INTRAVENOUS; SUBCUTANEOUS EVERY 5 MIN PRN
Status: CANCELLED | OUTPATIENT
Start: 2021-05-10

## 2021-05-10 RX ORDER — NALOXONE HYDROCHLORIDE 0.4 MG/ML
0.4 INJECTION, SOLUTION INTRAMUSCULAR; INTRAVENOUS; SUBCUTANEOUS
Status: DISCONTINUED | OUTPATIENT
Start: 2021-05-10 | End: 2021-05-10

## 2021-05-10 RX ORDER — NALOXONE HYDROCHLORIDE 0.4 MG/ML
0.4 INJECTION, SOLUTION INTRAMUSCULAR; INTRAVENOUS; SUBCUTANEOUS
Status: DISCONTINUED | OUTPATIENT
Start: 2021-05-10 | End: 2021-05-12 | Stop reason: HOSPADM

## 2021-05-10 RX ORDER — NALOXONE HYDROCHLORIDE 0.4 MG/ML
0.2 INJECTION, SOLUTION INTRAMUSCULAR; INTRAVENOUS; SUBCUTANEOUS
Status: DISCONTINUED | OUTPATIENT
Start: 2021-05-10 | End: 2021-05-12 | Stop reason: HOSPADM

## 2021-05-10 RX ORDER — FLUMAZENIL 0.1 MG/ML
0.2 INJECTION, SOLUTION INTRAVENOUS
Status: ACTIVE | OUTPATIENT
Start: 2021-05-10 | End: 2021-05-11

## 2021-05-10 RX ORDER — ONDANSETRON 2 MG/ML
4 INJECTION INTRAMUSCULAR; INTRAVENOUS EVERY 30 MIN PRN
Status: CANCELLED | OUTPATIENT
Start: 2021-05-10

## 2021-05-10 RX ORDER — NALOXONE HYDROCHLORIDE 0.4 MG/ML
0.2 INJECTION, SOLUTION INTRAMUSCULAR; INTRAVENOUS; SUBCUTANEOUS
Status: DISCONTINUED | OUTPATIENT
Start: 2021-05-10 | End: 2021-05-10

## 2021-05-10 RX ORDER — FENTANYL CITRATE 50 UG/ML
25-50 INJECTION, SOLUTION INTRAMUSCULAR; INTRAVENOUS
Status: CANCELLED | OUTPATIENT
Start: 2021-05-10

## 2021-05-10 RX ORDER — ONDANSETRON 2 MG/ML
INJECTION INTRAMUSCULAR; INTRAVENOUS PRN
Status: DISCONTINUED | OUTPATIENT
Start: 2021-05-10 | End: 2021-05-10

## 2021-05-10 RX ORDER — LIDOCAINE HYDROCHLORIDE 20 MG/ML
INJECTION, SOLUTION INFILTRATION; PERINEURAL PRN
Status: DISCONTINUED | OUTPATIENT
Start: 2021-05-10 | End: 2021-05-10

## 2021-05-10 RX ORDER — EPHEDRINE SULFATE 50 MG/ML
INJECTION, SOLUTION INTRAMUSCULAR; INTRAVENOUS; SUBCUTANEOUS PRN
Status: DISCONTINUED | OUTPATIENT
Start: 2021-05-10 | End: 2021-05-10

## 2021-05-10 RX ORDER — SODIUM CHLORIDE, SODIUM LACTATE, POTASSIUM CHLORIDE, CALCIUM CHLORIDE 600; 310; 30; 20 MG/100ML; MG/100ML; MG/100ML; MG/100ML
INJECTION, SOLUTION INTRAVENOUS CONTINUOUS PRN
Status: DISCONTINUED | OUTPATIENT
Start: 2021-05-10 | End: 2021-05-10

## 2021-05-10 RX ORDER — FENTANYL CITRATE 50 UG/ML
INJECTION, SOLUTION INTRAMUSCULAR; INTRAVENOUS PRN
Status: DISCONTINUED | OUTPATIENT
Start: 2021-05-10 | End: 2021-05-10

## 2021-05-10 RX ORDER — SODIUM CHLORIDE, SODIUM LACTATE, POTASSIUM CHLORIDE, CALCIUM CHLORIDE 600; 310; 30; 20 MG/100ML; MG/100ML; MG/100ML; MG/100ML
INJECTION, SOLUTION INTRAVENOUS CONTINUOUS
Status: CANCELLED | OUTPATIENT
Start: 2021-05-10

## 2021-05-10 RX ORDER — HYDRALAZINE HYDROCHLORIDE 20 MG/ML
2.5-5 INJECTION INTRAMUSCULAR; INTRAVENOUS EVERY 10 MIN PRN
Status: CANCELLED | OUTPATIENT
Start: 2021-05-10

## 2021-05-10 RX ORDER — INDOMETHACIN 50 MG/1
SUPPOSITORY RECTAL PRN
Status: DISCONTINUED | OUTPATIENT
Start: 2021-05-10 | End: 2021-05-10 | Stop reason: HOSPADM

## 2021-05-10 RX ORDER — PROPOFOL 10 MG/ML
INJECTION, EMULSION INTRAVENOUS PRN
Status: DISCONTINUED | OUTPATIENT
Start: 2021-05-10 | End: 2021-05-10

## 2021-05-10 RX ORDER — IOPAMIDOL 510 MG/ML
INJECTION, SOLUTION INTRAVASCULAR PRN
Status: DISCONTINUED | OUTPATIENT
Start: 2021-05-10 | End: 2021-05-10 | Stop reason: HOSPADM

## 2021-05-10 RX ORDER — DEXAMETHASONE SODIUM PHOSPHATE 4 MG/ML
INJECTION, SOLUTION INTRA-ARTICULAR; INTRALESIONAL; INTRAMUSCULAR; INTRAVENOUS; SOFT TISSUE PRN
Status: DISCONTINUED | OUTPATIENT
Start: 2021-05-10 | End: 2021-05-10

## 2021-05-10 RX ORDER — LIDOCAINE 40 MG/G
CREAM TOPICAL
Status: CANCELLED | OUTPATIENT
Start: 2021-05-10

## 2021-05-10 RX ORDER — ONDANSETRON 4 MG/1
4 TABLET, ORALLY DISINTEGRATING ORAL EVERY 30 MIN PRN
Status: CANCELLED | OUTPATIENT
Start: 2021-05-10

## 2021-05-10 RX ADMIN — METRONIDAZOLE 500 MG: 500 TABLET ORAL at 20:10

## 2021-05-10 RX ADMIN — SUGAMMADEX 200 MG: 100 INJECTION, SOLUTION INTRAVENOUS at 13:11

## 2021-05-10 RX ADMIN — SODIUM CHLORIDE, POTASSIUM CHLORIDE, SODIUM LACTATE AND CALCIUM CHLORIDE: 600; 310; 30; 20 INJECTION, SOLUTION INTRAVENOUS at 10:55

## 2021-05-10 RX ADMIN — ONDANSETRON 4 MG: 2 INJECTION INTRAMUSCULAR; INTRAVENOUS at 12:57

## 2021-05-10 RX ADMIN — CARVEDILOL 6.25 MG: 6.25 TABLET, FILM COATED ORAL at 16:41

## 2021-05-10 RX ADMIN — DEXAMETHASONE SODIUM PHOSPHATE 6 MG: 4 INJECTION, SOLUTION INTRA-ARTICULAR; INTRALESIONAL; INTRAMUSCULAR; INTRAVENOUS; SOFT TISSUE at 11:30

## 2021-05-10 RX ADMIN — PHENYLEPHRINE HYDROCHLORIDE 0.5 MCG/KG/MIN: 10 INJECTION INTRAVENOUS at 12:02

## 2021-05-10 RX ADMIN — PHENYLEPHRINE HYDROCHLORIDE 100 MCG: 10 INJECTION INTRAVENOUS at 12:02

## 2021-05-10 RX ADMIN — SPIRONOLACTONE 50 MG: 50 TABLET, FILM COATED ORAL at 16:40

## 2021-05-10 RX ADMIN — MIDAZOLAM 2 MG: 1 INJECTION INTRAMUSCULAR; INTRAVENOUS at 11:00

## 2021-05-10 RX ADMIN — URSODIOL 300 MG: 300 CAPSULE ORAL at 16:41

## 2021-05-10 RX ADMIN — Medication 1 TABLET: at 16:42

## 2021-05-10 RX ADMIN — POTASSIUM CHLORIDE 10 MEQ: 750 CAPSULE, EXTENDED RELEASE ORAL at 22:31

## 2021-05-10 RX ADMIN — MESALAMINE 2400 MG: 1.2 TABLET, DELAYED RELEASE ORAL at 16:40

## 2021-05-10 RX ADMIN — METRONIDAZOLE 500 MG: 500 TABLET ORAL at 16:41

## 2021-05-10 RX ADMIN — FENTANYL CITRATE 50 MCG: 50 INJECTION, SOLUTION INTRAMUSCULAR; INTRAVENOUS at 11:13

## 2021-05-10 RX ADMIN — Medication 70 MG: at 11:13

## 2021-05-10 RX ADMIN — PHENYLEPHRINE HYDROCHLORIDE 100 MCG: 10 INJECTION INTRAVENOUS at 11:32

## 2021-05-10 RX ADMIN — PHENYLEPHRINE HYDROCHLORIDE 150 MCG: 10 INJECTION INTRAVENOUS at 11:46

## 2021-05-10 RX ADMIN — FENTANYL CITRATE 50 MCG: 50 INJECTION, SOLUTION INTRAMUSCULAR; INTRAVENOUS at 11:50

## 2021-05-10 RX ADMIN — ROCURONIUM BROMIDE 30 MG: 10 INJECTION INTRAVENOUS at 11:33

## 2021-05-10 RX ADMIN — Medication 5 MG: at 11:13

## 2021-05-10 RX ADMIN — CEFTRIAXONE 2 G: 2 INJECTION, POWDER, FOR SOLUTION INTRAMUSCULAR; INTRAVENOUS at 13:57

## 2021-05-10 RX ADMIN — URSODIOL 300 MG: 300 CAPSULE ORAL at 20:10

## 2021-05-10 RX ADMIN — PHENYLEPHRINE HYDROCHLORIDE 100 MCG: 10 INJECTION INTRAVENOUS at 11:40

## 2021-05-10 RX ADMIN — SIMVASTATIN 20 MG: 5 TABLET, FILM COATED ORAL at 22:31

## 2021-05-10 RX ADMIN — DOCUSATE SODIUM 50 MG AND SENNOSIDES 8.6 MG 2 TABLET: 8.6; 5 TABLET, FILM COATED ORAL at 20:10

## 2021-05-10 RX ADMIN — PROPOFOL 90 MG: 10 INJECTION, EMULSION INTRAVENOUS at 11:13

## 2021-05-10 RX ADMIN — PHENYLEPHRINE HYDROCHLORIDE 150 MCG: 10 INJECTION INTRAVENOUS at 11:54

## 2021-05-10 RX ADMIN — LIDOCAINE HYDROCHLORIDE 100 MG: 20 INJECTION, SOLUTION INFILTRATION; PERINEURAL at 11:13

## 2021-05-10 RX ADMIN — FUROSEMIDE 20 MG: 20 TABLET ORAL at 16:41

## 2021-05-10 ASSESSMENT — ACTIVITIES OF DAILY LIVING (ADL)
ADLS_ACUITY_SCORE: 12

## 2021-05-10 ASSESSMENT — MIFFLIN-ST. JEOR: SCORE: 1122.94

## 2021-05-10 NOTE — PROGRESS NOTES
[place] [facility] Progress Note    ATTESTATION:    I, Gloria Morocho, saw and evaluated Mrs. Berta Escudero with Dr. Susana Wisdom (IM resident) I have reviewed and discussed with Dr. Susana Wisdom  the patient's history, physical and plan.     I personally reviewed the vital signs, medications, labs, and imaging.     Dr. Susana Wisdom 's excellent note with  physical exam, assessment/plan, discussion and recommendations reflect our joint thought process, differential diagnosis and recommended work up. Please see Dr. Susana Wisdom 's note for detailed recommendations.     Gloria Morocho MD  05/10/21  2010      Patient:  Berta Nava   YOB: 1965, MRN: [id number]  Date of Visit: 05/10/2021  Date of Admission: 5/3/2021  Consult Requester: Ruben Hay DO ID Problem List:  1. Strep mitis bacteremia   2. 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 Ascending cholangitis with hepatic abscess   4. SBP    Assessment and Discussion:  Elevated WBC and LFTs over the weekend c/f possible inadequate source control. Pt was otherwise clinically and hemodynamically stable. She is s/p ERCP 5/10 with exchange of stent. I spoke with GI and at this point they are hopeful that there is communication from these collections to the biliary tree and that they will slowly drain over time. They do not feel at this time that percutaneous intervention is necessary. They plan to repeat ERCP. We can plan to follow up after her repeat ERCP to determine final duration of abx and potentially transition to orals. If the repeat ERCP is not done for 4 weeks she will still need repeat imaging at approximately 2 weeks for decisions regarding antimicrobial management.      Most hepatic abscesses are polymicrobial, and streptococcus mitis can colonize the GI tract. Agree with choice of Ceftriaxone which should broadly cover the Strep mitis and any GNRs that may be present.  Metronidazole was added for augmented anaerobic coverage. She will need to complete 2 weeks of IV CTX for her Strep mitis bacteremia (this should also cover duration needed for SBP), however antibiotics will need to be prolonged beyond that to treat the liver abscess.     Recommendations:    1. Please continue Metronidazole 500mg Q8H and Ceftriaxone 2g Q24H. On 5/15, the patient will complete her treatment for Streptococcus mitis bacteremia, however the antibiotics will need to be continued further for treatment of the liver collection. Duration will depend on resolution of liver abscess and she will need follow up either after ERCP or after repeat imaging. Once we have a plan from GI in regards to when the follow up ERCP will be done, we can time antibiotics for at least one week after that (possibly longer if follow up ERCP still does not show resolution of the liver abscess)    2. While on antibiotics, please obtain at least weekly CBC, CMP and CRP (daily CBC and BMP while in hospital).     3. We scheduled a follow up in the ID clinic for May 26 at 12:00pm (video visit), however if it does not coordinate well with follow up ERCP (follow up should be shortly after follow up ERCP to determine final duration of antibiotics) we can reschedule      This patient was discussed with the attending physician , who agrees with the above assessment and plan    Thank you for the consult. ID will continue to follow with you.     Susana Wisdom MD   PGY2 Internal Medicine   05/07/2021           Interval History and Events:   NAEO. Pt is seen this afternoon following her ERCP. She states that she continues to feel well. She denies fevers, chills, abdominal pain, n/v/d/c. Over the weekend she also was feeling clinically well.          Antimicrobial Treatment:   Zosyn 4/29 - 5/4  Ceftriaxone 5/4 - present   Metronidazole 5/6 - present          Review of Systems:   Targeted 4 point ROS was completed with pertinent  "positives and negatives are detailed above.         HPI:   Adopted from initial consult note on 5/6:    \"She states that for 1-2 weeks prior to her admission she did not feel well. She describes fatigue, generalized malaise. Approximately 3-4 days prior to admission she started to develop fevers at night. As high as 101 at home. She was getting chills and sweats occasionally to the point where he  mentioned that she did not look well. She denies any clear localizing symptoms at that time such as SOB, cough, CP, significant abdominal pain, n/v/d/c, dysuria. Her abdomen is fairly distended from her ascites and this causes her some discomfort. She has recently started to get paracenteses recently on 4/13 (5L removal). She went to an appointment for a scheduled paracentesis on 4/28 and was noted to be significantly hypotensive (SBPs reported to be as low as 60s) and so was sent to the ED. At time of presentation she had labs notable for WBC 43, Procal 5.41, Cr 2.28 (from normal bl), LFTs above baseline. She had a paracentesis done with WBC 1610 68% PMNS, cultures remain negative. Blood cultures positive for Streptococcus mitis. She was admitted to the ICU for septic shock and was started on Zosyn. CT abdomen at that time was not particularly revealing. She improved over the next few days and pressors were weaned off. She was ultimately transferred to [facility] for continued management. Pt remained vitally stable here however alk phos had significantly uptrended from 472 to 719. On 5/3 she underwent repeat paracentesis  31% PMN. She underwent RUQ US on 5/4 which revealed, \"septated cystic collection in the right hepatic lobe demonstrated measuring up to 5.2 cm. This appears to have increased in size from MRI where there was a severely dilated duct in this region measuring up to 2.7 cm and correlates with a hypoattenuating area on CT. Given cholangitis history, this is suspicious for an intrahepatic abscess. " "Additional collections have also increased in size/are new from MRI (where there were severely dilated ducts and debris) and are noted on CT.\" She was transitioned to Ceftriaxone 5/4 and has remained afebrile and vitally stable. She underwent ERCP on 5/5 and they removed an occluded stent, a large amount of pus was evacuated, and a small 2cm collection off the L intrahepatic duct was identified but was not amenable to endoscopic intervention.\"         Physical Examination:   Temp:  [97.7  F (36.5  C)-98.6  F (37  C)] 97.7  F (36.5  C)  Pulse:  [65-89] 67  Resp:  [16-21] 18  BP: (102-137)/(68-86) 131/72  SpO2:  [97 %-100 %] 100 %    I/O last 3 completed shifts:  In: 760 [P.O.:240; I.V.:520]  Out: 0     Vitals:    05/03/21 1842 05/05/21 1247 05/10/21 0549   Weight: 71.9 kg (158 lb 8 oz) 70.3 kg (155 lb) 53.7 kg (118 lb 6.4 oz)     Constitutional: well appearing woman in NAD. Awake, alert, interactive. Sitting up comfortably in bed.  Respiratory: No increased work of breathing, CTAB, no crackles or wheezing.  Cardiovascular: RRR, no murmur noted. No peripheral edema.  GI: Hyperactive bowel sounds, abdomen is mildly distended but is soft and nontender to palpation. There is no rigidity or rebound ttp  Skin: Warm, dry, well-perfused. No bruising, bleeding, rashes, or lesions on limited exam.  Musculoskeletal: Extremities grossly normal, non-tender, no edema.   Neurologic: A&O. Answers questions appropriately, speech normal. Moves all extremities spontaneously.  Neuropsychiatric: Calm. Affect appropriate to situation.         Medications:       carvedilol  6.25 mg Oral BID w/meals     cefTRIAXone  2 g Intravenous Q24H     furosemide  20 mg Oral Daily     heparin lock flush  5-10 mL Intracatheter Q24H     mesalamine  2,400 mg Oral Daily with breakfast     metroNIDAZOLE  500 mg Oral TID     multivitamin w/minerals  1 tablet Oral Daily     potassium chloride ER  10 mEq Oral At Bedtime     simvastatin  20 mg Oral At Bedtime "     sodium chloride (PF)  3 mL Intracatheter Q8H     spironolactone  50 mg Oral Daily     ursodiol  300 mg Oral BID       Antiinfectives:  Anti-infectives (From now, onward)    Start     Dose/Rate Route Frequency Ordered Stop    05/09/21 0000  cefTRIAXone (ROCEPHIN) 2 GM vial      2 g Intravenous EVERY 24 HOURS 05/09/21 1009 05/26/21 2359    05/09/21 0000  metroNIDAZOLE (FLAGYL) 500 MG tablet      500 mg Oral 3 TIMES DAILY 05/09/21 1009 05/26/21 2359    05/06/21 1700  metroNIDAZOLE (FLAGYL) tablet 500 mg      500 mg Oral 3 TIMES DAILY 05/06/21 1628      05/04/21 1330  cefTRIAXone (ROCEPHIN) 2 g vial to attach to  ml bag for ADULTS or NS 50 ml bag for PEDS      2 g  over 30 Minutes Intravenous EVERY 24 HOURS 05/04/21 1318            Infusions/Drips:    - MEDICATION INSTRUCTIONS -       - MEDICATION INSTRUCTIONS -            Laboratory Data:   Microbiology:  Culture Micro   Date Value Ref Range Status   05/07/2021 Culture negative monitoring continues  Preliminary   05/07/2021 Culture negative monitoring continues  Preliminary   05/03/2021 No growth  Final   05/03/2021 No growth  Final   05/03/2021 No growth  Final   05/01/2021 No growth  Final   04/29/2021 No growth  Final   04/29/2021 (A)  Final    Cultured on the 1st day of incubation:  Streptococcus mitis     04/29/2021   Final    Critical Value/Significant Value, preliminary result only, called to and read back by  Sue Gutierrez RN on 4.30.2021 at 0942. KVO     04/29/2021 Susceptibility testing done on previous specimen  Final       Inflammatory Markers    No lab results found.    Metabolic Studies     Recent Labs   Lab Test 05/10/21  0629 05/09/21  0649 05/08/21  0533 04/30/21  0455 04/30/21  0455 04/29/21  1840 05/16/18  0650 05/16/18  0650 05/14/18  0653 05/14/18  0653    138 134   < > 129*  --    < > 138   < > 135   POTASSIUM 4.0 3.8 4.1   < > 4.7  --    < > 3.8   < > 3.1*   CHLORIDE 112* 112* 109   < > 102  --    < > 109   < > 102   CO2 18* 20  19*   < > 17*  --    < > 19*   < > 23   ANIONGAP 9 6 6   < > 10  --    < > 10   < > 10   BUN 21 17 19   < > 73*  --    < > 7   < > 10   CR 0.51* 0.45* 0.48*   < > 1.69*  --    < > 0.60   < > 0.83   GFRESTIMATED >90 >90 >90   < > 33*  --    < > >90   < > 71   GLC 85 87 136*   < > 196*  --    < > 73   < > 80   A1C  --   --   --   --   --  4.3  --   --   --   --    MARIELENA 9.0 8.7 8.8   < > 9.3  --    < > 8.4*   < > 8.0*   PHOS  --   --   --   --   --   --   --  2.6  --   --    MAG  --   --   --   --   --   --   --   --   --  2.7*   LACT  --   --   --   --  1.1  --    < >  --   --   --     < > = values in this interval not displayed.       Hepatic Studies    Recent Labs   Lab Test 05/10/21  0629 05/09/21  0649 05/08/21  0533   BILITOTAL 1.8* 1.3 1.9*   ALKPHOS 529* 538* 672*   ALBUMIN 2.0* 1.9* 2.0*   AST 69* 62* 67*   ALT 67* 69* 77*       Pancreatitis testing    Recent Labs   Lab Test 05/13/18  0935   LIPASE 142       Hematology Studies      Recent Labs   Lab Test 05/10/21  0629 05/09/21  0649 05/08/21  0533   WBC 13.3* 21.9* 11.1*   ANEU 10.0* 18.4* 10.4*   ALYM 1.7 1.6 0.5*   BRIGIDO 1.2 1.5* 0.1   AEOS 0.2 0.0 0.0   HGB 11.5* 10.1* 10.3*   HCT 34.6* 31.0* 31.6*    263 212     Urine Studies     Recent Labs   Lab Test 04/29/21  1106   URINEPH 5.5   NITRITE Negative   LEUKEST Negative   WBCU 15*     Last check of C difficile  C Diff Toxin B PCR   Date Value Ref Range Status   05/16/2018 Negative NEG^Negative Final     Comment:     Negative: Clostridium difficile target DNA sequences NOT detected, presumed   negative for Clostridium difficile toxin B or the number of bacteria present   may be below the limit of detection for the test.  FDA approved assay performed using [employer] GeneXpert real-time PCR.  A negative result does not exclude actual disease due to Clostridium difficile   and may be due to improper collection, handling and storage of the specimen   or the number of organisms in the specimen is below the  detection limit of the   assay.              Imaging:     Results for orders placed or performed during the hospital encounter of 05/03/21   US Abdomen Limited    Narrative    EXAMINATION: Limited Abdominal Ultrasound, 5/4/2021 8:49 AM     COMPARISON: CT abdomen and pelvis without contrast 4/29/2021.  Ultrasound abdomen 4/29/2021 and 3/23/2021. MRI abdomen 4/22/2021.    HISTORY: Worsening liver function tests in patient with decompensated  cirrhosis, history of cholangitis and biliary stent.    FINDINGS:   Fluid: Small volume of ascites.    Liver: The liver demonstrates markedly coarsened heterogeneous  echotexture, measuring 16.8 cm in craniocaudal dimension. Distorted  anatomy secondary to underlying chronic liver disease. Perihepatic  ascites present. There is a septated cystic-appearing area in the  posterior right hepatic lobe measuring 5.2 x 3.7 x 3.9 cm. No internal  vascularity. On CT, there is a hypoattenuating 6.8 x 5 cm collection  in this region. Additional markedly dilated ducts and possible  collections in the caudate and central right hepatic lobe not well  visualized sonographically. On MRI there are dilated ducts in this  region with a dilated portion measuring up to 2.7 cm. The biliary tree  is not well evaluated sonographically in the liver due to marked  heterogeneity. Main portal vein is patent with antegrade flow.    Gallbladder: Gallbladder is slightly distended. Layering sludge and  stones better visualized on CT and MRI. There is no positive  sonographic Javier's sign. Gallbladder wall is mildly thickened at 4  mm with some mild edema likely reactive given liver disease and  ascites.    Bile Ducts: Both the intra- and extrahepatic biliary system are of  normal caliber.  The common bile duct measures 4 mm in diameter.  Biliary stent not well-visualized sonographically.    Pancreas: Limited visualization. Nodule adjacent to the pancreatic  head measuring 2.7 x 1.3 x 2.5 cm likely a lymph node  when correlated  to prior CT and MRI. These have been previously biopsied as sarcoid  when correlated to prior MRI.    Kidney: The right kidney measures 12.4 cm long. There is no  hydronephrosis or hydroureter, no shadowing renal calculi, cystic  lesion or mass.       Impression    IMPRESSION:   1.  Changes from hepatic cirrhosis with marked heterogeneity and  distorted anatomy.  2.  Complex septated cystic collection in the right hepatic lobe  demonstrated measuring up to 5.2 cm. This appears to have increased in  size from MRI where there was a severely dilated duct in this region  measuring up to 2.7 cm and correlates with a hypoattenuating area on  CT. Given cholangitis history, this is suspicious for an intrahepatic  abscess. Additional collections have also increased in size/are new  from MRI (where there were severely dilated ducts and debris) and are  noted on CT. Limited assessment of these areas sonographically.  Consider close follow-up MRI/MRCP as clinically warranted.  3.  Enlarged lymph node adjacent to the pancreas. This previously  biopsied as sarcoid.  4.  Common bile duct not significantly distended. Biliary stent not  well shown sonographically.  5.  Ascites redemonstrated, decreased in volume given paracentesis  5/3/2021.    VIDHYA TREADWELL MD   CT Dental wo Contrast    Narrative    CT DENTAL WO CONTRAST 5/5/2021 10:53 AM    History:  evaluate for abscess given strep mitis bacteremia    Comparison:  None available      TECHNIQUE: 3-D reconstruction by the technologists, with curved  multiplanar reformat of thin section imaging through the mandible and  maxilla obtained without intravenous contrast.    FINDINGS:  No significant soft tissue swelling or mass. Normal facial bone  alignment. No bony erosion. Large dental caries of left maxillary  second premolar without significant periapical disease. The orbits are  unremarkable.    Visualized portions of the paranasal sinuses are clear.  Degenerative  changes of the right temporomandibular joint with mild mandibular head  subchondral erosion. The left temporomandibular joint is normal.      Impression    IMPRESSION:  No periapical abscess. Large dental caries of the left maxillary  second premolar.    I have personally reviewed the examination and initial interpretation  and I agree with the findings.    HAZEL PAK MD   XR Surgery CARRIE Fluoro L/T 5 Min w Stills    Narrative    This exam was marked as non-reportable because it will not be read by a   radiologist or a [facility] non-radiologist provider.         US Abdomen Limited    Narrative    EXAMINATION: US ABDOMEN COMPLETE   5/7/2021 9:09 AM      HISTORY: Evaluate for resolution of possible abscess after ERCP    COMPARISON: Ultrasound abdomen 5/4/2021, 5/3/2021    TECHNIQUE: The abdomen was scanned in standard fashion with  specialized ultrasound transducer(s) using both gray-scale and limited  color Doppler techniques.    FINDINGS: The liver has a diffusely coarsened echotexture and measures  15.5 cm in the craniocaudal dimension. Moderate ascites, slightly  increased from previous. Septated cystic lesion in the posterior right  hepatic lobe measuring 4.0 x 4.5 x 4.1 cm, previously 5.2 x 3.7 x 3.9  cm. The pancreas is not well visualized on this exam. Gallbladder  appears enlarged with wall thickening measuring up to 5.5 mm.  Sonographic Javier's sign is negative. There is intraluminal sludge  but no gallstones. The walls of the common bile duct are not well seen  due to interval placement of common bile duct stent. The aorta and IVC  are normal. The right kidney is partially obscured on today's exam.      Impression    IMPRESSION:  1. Cirrhotic changes of the liver.  2. Complex septated cystic lesion in the right hepatic lobe appears  slightly smaller in size from previous exam.  3. The gallbladder appears enlarged with wall thickening and  intraluminal sludge likely related to underlying  liver disease. No  focal tenderness to suggest acute cholecystitis. A common bile duct  stent is visualized on today's exam.  3. Moderate volume ascites, slightly increased from previous exam.    I have personally reviewed the examination and initial interpretation  and I agree with the findings.    MARTINEZ JAVED MD   Echo Complete    Narrative    [id number]  [id number]  [id number]  [id number]^TAI^EULOGIO     [facility]  Echocardiography Laboratory  [address] [place] [address]     Name: ESTUARDO JOYCE  MRN: [id number]  : 1965  Study Date: 2021 01:08 PM  Age: 55 yrs  Gender: Female  Patient Location: [facility]  Reason For Study: Endocarditis  Ordering Physician: EULOGIO LUJAN  Referring Physician: SHERRON FAIRBANKS  Performed By: MARLON Harding     BSA: 1.8 m2  Height: 65 in  Weight: 158 lb  BP: 128/91 mmHg  ______________________________________________________________________________  Procedure  Complete Portable Echo Adult.  ______________________________________________________________________________  Interpretation Summary  Global and regional left ventricular function is normal with an EF of 60-65%.  Global right ventricular function is normal. The right ventricle is normal  size.  No significant valvular abnormalities.  IVC diameter <2.1 cm collapsing >50% with sniff suggests a normal RA pressure  of 3 mmHg.  Ascites is noted.  This study was compared with the study from 2021 (resting tomograms from  stress study). No significant changes noted.  ______________________________________________________________________________  Left Ventricle  Global and regional left ventricular function is normal with an EF of 60-65%.  Left ventricular wall thickness is normal. Left ventricular size is normal.  Left ventricular diastolic function is indeterminate.     Right Ventricle  Global right ventricular function is normal. The right ventricle is normal  size.      Atria  Both atria appear normal.     Mitral Valve  The mitral valve is normal. Trace mitral insufficiency is present.     Aortic Valve  The aortic valve is tricuspid. On Doppler interrogation, there is no  significant stenosis or regurgitation.     Tricuspid Valve  The tricuspid valve is normal. Trace tricuspid insufficiency is present.  Pulmonary artery systolic pressure cannot be assessed.     Pulmonic Valve  The valve leaflets are not well visualized. Trace pulmonic insufficiency is  present.     Vessels  Sinuses of Valsalva 3.0 cm. Ascending aorta 3.5 cm. IVC diameter <2.1 cm  collapsing >50% with sniff suggests a normal RA pressure of 3 mmHg.     Pericardium  No pericardial effusion is present.     Miscellaneous  Ascites is noted.     Compared to Previous Study  This study was compared with the study from 2021 (resting tomograms from  stress study) . No significant changes noted.  ______________________________________________________________________________  MMode/2D Measurements & Calculations  IVSd: 0.79 cm  LVIDd: 4.5 cm  LVIDs: 2.6 cm  LVPWd: 0.79 cm  FS: 41.4 %  LV mass(C)d: 110.9 grams  LV mass(C)dI: 62.0 grams/m2  Ao root diam: 3.0 cm  asc Aorta Diam: 3.5 cm  LVOT diam: 2.3 cm  LVOT area: 4.2 cm2  LA Volume (BP): 55.6 ml     LA Volume Index (BP): 31.1 ml/m2  RWT: 0.35     Doppler Measurements & Calculations  MV E max christiano: 66.0 cm/sec  MV A max christiano: 90.0 cm/sec  MV E/A: 0.73  MV dec slope: 284.0 cm/sec2  MV dec time: 0.23 sec  PA V2 max: 103.0 cm/sec  PA max P.2 mmHg  PA acc time: 0.14 sec  E/E' av.7  Lateral E/e': 6.7  Medial E/e': 10.6     ______________________________________________________________________________  Report approved by: Ho Dunlap 2021 02:08 PM

## 2021-05-10 NOTE — PROGRESS NOTES
[facility]    Medicine Progress Note - Hospitalist Service, Gold 11       Date of Admission:  5/3/2021  Assessment & Plan          Pt is a 55 year old with hx of PSC with cirrhosis c/b ascites and esophageal varices, UC on mesalamine, biliary strictures s/p stenting (1/6/21), pancreatic mucinous cystadenoma, and currently undergoing liver transplant evaluation who presented to [facility] with septic shock due to S. Mitis bacteremia and SBP, now s/p ERCP on 5/5 and 5/10 with biliary stent exchange and persistence of stones and pus in biliary tree concerning for cholangitis as well as ~2 cm fluid collection suggestive of abscess. ID recommending IV antibiotic therapy on discharge, duration to be determined.    Changes today:  - Given leukocytosis, will elect to continue observation for additional 24 hours  - Will obtain diagnostic/therapeutic paracentesis given abdominal distension prior to discharge  - Will administer Hep A/Hep B vaccine doses while inpatient to begin series    Septic shock 2/2 strep mitis bacteremia, resolved  SBP  Acute cholangitis s/p ERCP on 5/5, 5/10  Hepatic abscess/fluid collection  Underwent ERCP 5/5 w/ biliary stent exchange and with large amounts of stone and pus removed suggestive of cholangitis. Also noted to have possible ~ 2 cm abscess/collection off of the left intrahepatic that could not be accessed. Repeat ERCP on 5/10 was performed due to worsening leukocytosis and LFTs, had biliary stent exchanged during procedure - hope is that liver abscess will drain internally after procedure. No plans for source control for abscess at this point.  - ID following, appreciate recommendations   - Continue CTX and Flagyl through 5/26 when scheduled for follow-up with Dr. Morocho of ID   - Pending labs/clinical picture on 5/26, may transition to Levaquin/Flagyl until ERCP or continue IV CTX instead   - Further antibiotic duration planning pending results of  ERCP scheduled for ~4 weeks from now  - Weekly CBC, CMP, CRP as outpatient  - Patient underwent PICC teaching on 5/10  - Outpatient follow-up with [facility] (patient has appointment) given abnormalities on CT imaging    Acute decompensated cirrhosis likely secondary to SBP  Primary sclerosing cholangitis with chronic cirrhosis  Recurrent ascites  Grade III esophageal varices not banded  Ascending cholangitis w/ possible hepatic abscess   - Hepatology consulted for further transplant workup, will need outpatient mammography (pt has scheduled)  - Lasix 20 mg daily, spironolactone 50 mg daily.  - Daily MELD labs.  - SBP ppx after abx course complete with Ciprofloxacin  - Ursodiol 300 mg BID  - Hep A/B vaccination (first dose of series given on 5/11)    Possible hematochezia  Patient noted some blood in the toilet 2 days prior to transfer but has had none since  -As needed senna and Dulcolax for constipation     GONZALO, resolved  -Likely related to septic shock.     NAGMA  Started on initial presentation to neighboring hospital likely due to GONZALO. Has remained stable will continue to monitor and workup further if bicarb continues to drop. No diarrhea. Could consider workup for RTA if persistent.  - BMP daily     Ulcerative colitis  -Continue PTA mesalamine.     Severe malnutrition in the context of acute on chronic illness  -Nutrition consult.     Hypertension  -Hold PTA lisinopril   -Transitioned to Coreg this hospitalization     Hyperlipidemia  -Hold PTA statin, will resume on discharge       Diet: Snacks/Supplements Adult: Other; Ensure Enlive Shake (chocolate) at HS snack time; Between Meals  Diet  Low Fat Diet    DVT Prophylaxis: Pneumatic Compression Devices  Jurado Catheter: not present  Code Status: Full Code           Disposition Plan   Expected discharge: Tomorrow, recommended to prior living arrangement once antibiotic plan established and leukocytosis resolved.  Entered: Pati Kohler MD 05/10/2021,  6:14 PM       The patient's care was discussed with the Bedside Nurse, Patient and ID Consultant.    Pati Kohler MD  Hospitalist Service, [address]  Contact information available via [facility] Paging/Directory  Please see sign in/sign out for up to date coverage information  ______________________________________________________________________    Interval History   Berta is doing well. Reports that her abdomen is more distended than on previous days and she wonders if she needs a paracentesis. Did not have issues last time this procedure was performed. Denies any fevers/chills overnight, abdominal pain, or nausea/vomiting. No leg swelling either. She feels ready to return home and is disappointed she is not leaving today, but also understands given her prolonged hospitalization that we want to make sure she is moving in the right direction.    She has scheduled appointments with the dentists and for a mammogram today.    Data reviewed today: I reviewed all medications, new labs and imaging results over the last 24 hours. I personally reviewed no images or EKG's today.    Physical Exam   Vital Signs: Temp: 98.8  F (37.1  C) Temp src: Oral BP: 115/72 Pulse: 66   Resp: 22 SpO2: 98 % O2 Device: None (Room air) Oxygen Delivery: 1 LPM  Weight: 118 lbs 6.4 oz  General Appearance: Pleasant, conversant no acute distress  Respiratory: Breathing comfortably on room air, lungs clear to auscultation bilaterally.  Cardiovascular: RRR, no m/r/g. Radial pulses 2+ and symmetric.  GI: distended, tense abdomen, non-tender to palpation, no guarding or rebound.  Skin: no skin rash, jaundice present  Other: Trace LE edema     Data   Recent Labs   Lab 05/11/21  0724 05/10/21  0629 05/09/21  0649   WBC 18.3* 13.3* 21.9*   HGB 9.7* 11.5* 10.1*   MCV 92 94 94    293 263   INR 1.45* 1.38* 1.41*    139 138   POTASSIUM 4.1 4.0 3.8   CHLORIDE 108 112* 112*   CO2 18* 18*  20   BUN 30 21 17   CR 0.65 0.51* 0.45*   ANIONGAP 8 9 6   MARIELENA 8.6 9.0 8.7   * 85 87   ALBUMIN 2.0* 2.0* 1.9*   PROTTOTAL 5.8* 6.2* 5.8*   BILITOTAL 1.6* 1.8* 1.3   ALKPHOS 479* 529* 538*   ALT 57* 67* 69*   AST 55* 69* 62*     No results found for this or any previous visit (from the past 24 hour(s)).

## 2021-05-10 NOTE — PLAN OF CARE
"/72 (BP Location: Right arm)   Pulse 67   Temp 97.7  F (36.5  C) (Oral)   Resp 18   Ht 1.651 m (5' 5\")   Wt 53.7 kg (118 lb 6.4 oz)   SpO2 100%   BMI 19.70 kg/m     Neuro: A/Ox4  VS: VSS on RA  CAPNO: WNL  Pain: Denies  GI: on clear liquid diet. Denies nausea. No BM this shift  : Voiding without difficulty  Skin: Jaundiced  R arm PICC TKO  Activity - Pt just arrived to 7A from PACU and was transferred from bed to bed with [facility]  Education - [facility] at bedside for PICC teaching  Plan of Care - Please administer AM medication that was on Hold due to ERCP    "

## 2021-05-10 NOTE — PLAN OF CARE
Vitally stable on room air, denies pain or nausea. Fair appetite on regular diet. Up independently in room. Reports small BM. Patient will be NPO at midnight for ERCP tomorrow morning.

## 2021-05-10 NOTE — ANESTHESIA POSTPROCEDURE EVALUATION
Patient: Berta Nava    Procedure(s):  ENDOSCOPIC RETROGRADE CHOLANGIOPANCREATOGRAPHY with biliary dilation, stone removal, stent exchange    Diagnosis:Cholangitis [K83.09]  Diagnosis Additional Information: No value filed.    Anesthesia Type:  General    Note:  Disposition: Admission   Postop Pain Control: Uneventful            Sign Out: Well controlled pain   PONV: No   Neuro/Psych: Uneventful            Sign Out: Acceptable/Baseline neuro status   Airway/Respiratory: Uneventful            Sign Out: Acceptable/Baseline resp. status   CV/Hemodynamics: Uneventful            Sign Out: Acceptable CV status; No obvious hypovolemia; No obvious fluid overload   Other NRE: NONE   DID A NON-ROUTINE EVENT OCCUR? No           Last vitals:  Vitals:    05/10/21 1330 05/10/21 1345 05/10/21 1400   BP: 115/77 118/73 121/76   Pulse: 82 69 66   Resp: 18 17 20   Temp: 36.9  C (98.4  F) 36.8  C (98.2  F) 36.7  C (98.1  F)   SpO2: 99% 100% 99%       Last vitals prior to Anesthesia Care Transfer:  CRNA VITALS  5/10/2021 1250 - 5/10/2021 1350      5/10/2021             NIBP:  109/76    Ht Rate:  90          Electronically Signed By: Hong Falcon MD  May 10, 2021  2:15 PM

## 2021-05-10 NOTE — PROGRESS NOTES
Care Management Follow Up    Length of Stay (days): 7    Expected Discharge Date: 05/07/21     Concerns to be Addressed: discharge planning, care coordination/care conferences     Patient plan of care discussed at interdisciplinary rounds: Yes    Anticipated Discharge Disposition: Home, Home Infusion     Anticipated Discharge Services: None  Anticipated Discharge DME: None    Patient/family educated on Medicare website which has current facility and service quality ratings: no  Education Provided on the Discharge Plan:    Patient/Family in Agreement with the Plan: yes    Referrals Placed by CM/SW: Home Infusion      Additional Information:  Pt status reviewed/discussed during care team rounds.   Pending ERCP pt may be able to discharge today.  A referral was made to [facility] as patient will discharge on q day IV Ceftriaxone.    1505: Spoke with Dr. Hay who confirmed plan is to monitor pt overnight with anticipated plan for discharge tomorrow.   Updated [facility] Liaison       Brissa Ruelas, RN BSN, PHN, ACM-RN  7A RN Care Coordinator  Phone: [phone]  Pager [phone]    5/10/2021 3:07 PM

## 2021-05-10 NOTE — PLAN OF CARE
[id number] Nursing Shift Report  Berta appeared to sleep soundly between cares, VSS. Did express to her  She can have clears up to 2hrs prior to ERCP, but not sure time of ERCP. In talking with Berta she said that she will only take in a few icechips so as not to interfere with having her ERCP. Will continue to monitor and report any significant changes.

## 2021-05-10 NOTE — CONSULTS
05/10/21 7151 Rosalva Becerra, RN     Patient seen at bedside for PICC/IV medication training. States she has never been home with a PICC line. RD correctly on a model with flushing, cap change and IV push method for administration of medication. Asked many relevant questions. Answered all teach back questions appropriately. States she will have her daughter present with home care visit, spouse will be home for support too. States she understands all information presented.   Literature given: Handwashing and Skin Care, Getting Ready for Your PICC, Caring for Your PICC at Home, Changing the End Cap, Flushing the Line with Heparin, Saline or Citrate, and Instructions for IV Push Medicine.

## 2021-05-10 NOTE — ANESTHESIA PREPROCEDURE EVALUATION
Anesthesia Pre-Procedure Evaluation    Patient: Berta Nava   MRN: [id number] : 1965        Preoperative Diagnosis: PSC (primary sclerosing cholangitis) [K83.01]  Choledocholithiasis [K80.50]   Procedure : Procedure(s):  ENDOSCOPIC RETROGRADE CHOLANGIOPANCREATOGRAPHY with Stent Exchange     Past Medical History:   Diagnosis Date     Ascites      Biliary cirrhosis (H)      Cholangitis, sclerosing      Cirrhosis of liver with ascites (H) 3/3/2021     Hearing loss of left ear     wears a hearing aide     History of low potassium      Hyperlipidemia      Hypertension      SBP (spontaneous bacterial peritonitis) (H) 2021     Sjogren's syndrome (H)      Ulcerative colitis (H)      Ulcerative pancolitis (H)       Past Surgical History:   Procedure Laterality Date     ENDOSCOPIC RETROGRADE CHOLANGIOPANCREATOGRAPHY, EXCHANGE TUBE/STENT N/A 2021    Procedure: ENDOSCOPIC RETROGRADE CHOLANGIOPANCREATOGRAPHY WITH STENT EXCHANGE, STONE EXTRACTION, AND DILATION;  Surgeon: Gregory Gabriel MD;  Location: [facility] OR     GYN SURGERY      bilat fallopian tubes and ovaries removed     PICC DOUBLE LUMEN PLACEMENT Right 2021    42cm (2cm external), Lateral brachial vein      Allergies   Allergen Reactions     Diagnostic X-Ray Materials Hives     PATIENT HAD HIVE REACTION AFTER ADMINISTERING CT CONTRAST DYE.       Contrast Dye       Social History     Tobacco Use     Smoking status: Never Smoker     Smokeless tobacco: Never Used   Substance Use Topics     Alcohol use: No     Comment: Last drink was 2017      Wt Readings from Last 1 Encounters:   21 70.3 kg (155 lb)        Anesthesia Evaluation   Pt has had prior anesthetic. Type: General.    No history of anesthetic complications       ROS/MED HX  ENT/Pulmonary:  - neg pulmonary ROS     Neurologic:  - neg neurologic ROS     Cardiovascular:     (+) Dyslipidemia hypertension-----    METS/Exercise Tolerance:     Hematologic:  - neg hematologic  ROS      Musculoskeletal:  - neg musculoskeletal ROS     GI/Hepatic: Comment: Acute decompensated cirrhosis likely secondary to SBP  Primary sclerosing cholangitis with chronic cirrhosis  Cholangitis    (+) Inflammatory bowel disease, liver disease,     Renal/Genitourinary:  - neg Renal ROS     Endo:  - neg endo ROS     Psychiatric/Substance Use:  - neg psychiatric ROS     Infectious Disease: Comment: SBP      Malignancy:       Other:  - neg other ROS          Physical Exam    Airway        Mallampati: II   TM distance: > 3 FB   Neck ROM: full   Mouth opening: > 3 cm    Respiratory Devices and Support         Dental  no notable dental history         Cardiovascular   cardiovascular exam normal       Rhythm and rate: regular and normal     Pulmonary   pulmonary exam normal        breath sounds clear to auscultation           OUTSIDE LABS:  CBC:   Lab Results   Component Value Date    WBC 21.9 (H) 05/09/2021    WBC 11.1 (H) 05/08/2021    HGB 10.1 (L) 05/09/2021    HGB 10.3 (L) 05/08/2021    HCT 31.0 (L) 05/09/2021    HCT 31.6 (L) 05/08/2021     05/09/2021     05/08/2021     BMP:   Lab Results   Component Value Date     05/09/2021     05/08/2021    POTASSIUM 3.8 05/09/2021    POTASSIUM 4.1 05/08/2021    CHLORIDE 112 (H) 05/09/2021    CHLORIDE 109 05/08/2021    CO2 20 05/09/2021    CO2 19 (L) 05/08/2021    BUN 17 05/09/2021    BUN 19 05/08/2021    CR 0.45 (L) 05/09/2021    CR 0.48 (L) 05/08/2021    GLC 87 05/09/2021     (H) 05/08/2021     COAGS:   Lab Results   Component Value Date    INR 1.41 (H) 05/09/2021     POC:   Lab Results   Component Value Date    BGM 75 05/05/2021     HEPATIC:   Lab Results   Component Value Date    ALBUMIN 1.9 (L) 05/09/2021    PROTTOTAL 5.8 (L) 05/09/2021    ALT 69 (H) 05/09/2021    AST 62 (H) 05/09/2021    ALKPHOS 538 (H) 05/09/2021    BILITOTAL 1.3 05/09/2021     OTHER:   Lab Results   Component Value Date    LACT 1.1 04/30/2021    A1C 4.3 04/29/2021    MARIELENA 8.7  05/09/2021    PHOS 2.6 05/16/2018    MAG 2.7 (H) 05/14/2018    LIPASE 142 05/13/2018       Anesthesia Plan    ASA Status:  3   NPO Status:  NPO Appropriate    Anesthesia Type: General.     - Airway: ETT   Induction: Intravenous.   Maintenance: Balanced.   Techniques and Equipment:     - Lines/Monitors: 2nd IV     Consents    Anesthesia Plan(s) and associated risks, benefits, and realistic alternatives discussed. Questions answered and patient/representative(s) expressed understanding.     - Discussed with:  Patient      - Extended Intubation/Ventilatory Support Discussed: No.      - Patient is DNR/DNI Status: No    Use of blood products discussed: No .     Postoperative Care    Pain management: IV analgesics.   PONV prophylaxis: Ondansetron (or other 5HT-3)     Comments:                    Alessandro Moulton MD

## 2021-05-10 NOTE — ANESTHESIA PROCEDURE NOTES
Airway       Patient location during procedure: OR  Staff -        CRNA: Jeanette Oneil APRN CRNA       Performed By: CRNA  Consent for Airway        Urgency: elective  Indications and Patient Condition       Indications for airway management: gerardo-procedural       Induction type:intravenous       Mask difficulty assessment: 1 - vent by mask    Final Airway Details       Final airway type: endotracheal airway       Successful airway: ETT - single  Endotracheal Airway Details        ETT size (mm): 7.0       Cuffed: yes       Successful intubation technique: direct laryngoscopy       DL Blade Type: Cheung 2       Grade View of Cords: 1       Adjucts: stylet       Position: Right       Measured from: gums/teeth       Secured at (cm): 21       Bite block used: None    Post intubation assessment        Placement verified by: capnometry, equal breath sounds and chest rise        Number of attempts at approach: 1       Secured with: silk tape       Ease of procedure: easy       Dentition: Intact and Unchanged    Medication(s) Administered   Medication Administration Time: 5/10/2021 11:13 AM

## 2021-05-10 NOTE — OP NOTE
ERCP 05/10/2021 11:05 AM [facility], [place], operated by [facility], [address] [place] [address] [phone]     Endoscopy Department   _______________________________________________________________________________   Patient Name: Berta Nava           Procedure Date: 5/10/2021 11:05 AM   MRN: [id number]                       Account Number: [id number]   YOB: 1965              Admit Type: Inpatient   Age: 55                               Room: OR   Gender: Female                        Note Status: Finalized   Attending MD: Gregory Gabriel MD       Pause for the Cause: time out performed   Total Sedation Time:                     _______________________________________________________________________________       Procedure:           ERCP   Indications:         For therapy of ascending cholangitis, 55-year-old with a                        history of PSC causing cirrhosis complicated by                        quiescent UC on mesalamine, large EV (non banded),                        ascites, biliary strictures and choledocholithiasis s/p                        stenting (1/6/21), granulomatous lymph node concerning                        for sarcoidosis, Sjogrens who was admitted on 4/29 with                        hypotension, fevers and lethargy and noted to have SBP                        and strep mitis bacteremia. Underwent ERCP on 5/5 and                        prior stent removed with cholangiogram showing diffuse                        biliary strictures/saccular dilations with intrahepatic                        stones. Mainly able to clear what was thought to be left                        intrahepatic (in retrospect on CT this was right                        intrahepatic branch) with evidence of possible liver                        abscess that couldn't be deeply cannulated. Did well                        post-procedure with decreasing LFTs but yesterday                         developed leukocytosis to 20 with CT showing further                        stone burden and persistent liver collection/abscess.   Providers:           Gregory Gabriel MD   Referring MD:           Requesting Provider: Emma Mao MD, Thomas M. Leventhal, MD, Gonzalo Tapia MD   Medicines:           General Anesthesia, Indomethacin 100 mg AK; antibiotics                        as scheduled   Complications:       No immediate complications. Estimated blood loss:                        Minimal.   _______________________________________________________________________________   Procedure:           Pre-Anesthesia Assessment:                        - Prior to the procedure, a History and Physical was                        performed, and patient medications and allergies were                        reviewed. The patient is competent. The risks and                        benefits of the procedure and the sedation options and                        risks were discussed with the patient. All questions                        were answered and informed consent was obtained. Patient                        identification and proposed procedure were verified by                        the physician, the nurse, the anesthesiologist and the                        anesthetist in the procedure room. Mental Status                        Examination: alert and oriented. Airway Examination:                        normal oropharyngeal airway and neck mobility.                        Respiratory Examination: clear to auscultation. CV                        Examination: normal. Prophylactic Antibiotics: The                        patient requires prophylactic antibiotics for the                        planned ERCP due to active bacterial cholangitis. The                        patient received antibiotic therapy before the                        procedure. Prior Anticoagulants: The patient has taken                         no previous anticoagulant or antiplatelet agents. ASA                        Grade Assessment: III - A patient with severe systemic                        disease. After reviewing the risks and benefits, the                        patient was deemed in satisfactory condition to undergo                        the procedure. The anesthesia plan was to use general                        anesthesia. Immediately prior to administration of                        medications, the patient was re-assessed for adequacy to                        receive sedatives. The heart rate, respiratory rate,                        oxygen saturations, blood pressure, adequacy of                        pulmonary ventilation, and response to care were                        monitored throughout the procedure. The physical status                        of the patient was re-assessed after the procedure.                        After obtaining informed consent, the scope was passed                        under direct vision. Throughout the procedure, the                        patient's blood pressure, pulse, and oxygen saturations                        were monitored continuously. The was introduced through                        the mouth, and used to inject contrast into and used to                        inject contrast into the bile duct. The ERCP was                        accomplished without difficulty. The patient tolerated                        the procedure well.                                                                                     Findings:        A biliary stent was visible on the  film. The esophagus was        successfully intubated under direct vision. The scope was advanced from        the mouth to the duodenum. The pharynx, larynx and associated        structures, as well as the upper GI tract, were normal. A biliary        sphincterotomy had been performed. The sphincterotomy appeared open. One         plastic stent originating in the biliary tree was emerging from the        major papilla. The stent was visibly patent. One stent was removed from        the biliary tree using a snare. The bile duct was deeply cannulated with        the short-nosed traction sphincterotome. Contrast was injected. I        personally interpreted the bile duct images. There was brisk flow of        contrast through the ducts. Image quality was excellent. Contrast        extended to the entire biliary tree. The entire biliary tree contained        multiple stenoses with attenuation of the tertiary branches.        Extravasation of contrast coming off of right intrahepatic branch into        presumably an abscess but this could not be deeply accessed. A        peripheral right intrahepatic branch full of stones could also not be        accessed. A 0.035 inch angled Glidewire and Visiglide wire were passed        into the biliary tree. Dilation of the left intrahepatic branches with a        4 mm balloon dilator was successful. The biliary tree was swept with a        12 mm balloon and basket starting at the left intrahepatic duct(s) and        right intrahepatic duct(s). Sludge was swept from the duct. Many stones        were removed. No stones remained. One 10 Fr by 22 cm transpapillary        plastic stent was placed 21 cm into the right hepatic duct. Bile flowed        through the stent. The stent was in good position. One 8.5 Fr by 20 cm        transpapillary plastic stent was placed 19.5 cm into the left hepatic        duct. Bile flowed through the stent. The stent was in good position.                                                                                     Impression:          - One stent from the biliary tree was seen in the major                        papilla. Removed. This stent was previously thought to                        be in the left intrahepatic but on CT it was actually in                         the right posterior duct. Patient has a shrunken/rotated                        liver making fluoroscopic ductal anatomy interpretation                        more challenging.                        - Cholangiogram again showed diffusely irregular biliary                        tree with paucity/loss of multiple tertiary branches                        consistent with PSC/cirrhosis. A peripheral branch off                        of right anterior? contiained a saccular dilation with                        multiple filling defects/stones that could not be                        accessed. The ~2 cm collection/abscess off of the right                        posterior duct was again visualized and could not be                        accessed. More cephlad collection seen on CT did not                        fill.                        - Cystic duct/gallbladder opacified                        - Residual stones in the right posterior duct and left                        intrahepatic duct cleared with balloon dilation and                        balloon/basket sweeping. Large amount of stone,                        sludge/cast-like material with associated pus removed                        - One 10 Fr x 22 cm Johlin stent placed into the right                        posterior intrahepatic biliary tree                        - One 8.5 Fr x 20 cm Johlin stent placed into left                        intrahepatic duct                        - Grade III esophageal varices. Not banded at this time                        due to concern that early repeat ERCP intervention could                        be needed for recurrent cholangitis   Recommendation:      - Return patient to hospital strong for ongoing care.                        - Continue course of antibiotics                        - Monitor LFTs and clinical status for improvement.                        - Tentatively plan for repeat ERCP in 1 month with                          Harvey                        - Hepatology to continue following                                                                                       Gregory Gabriel MD

## 2021-05-10 NOTE — PROVIDER NOTIFICATION
Paged provider at [phone] to inquire about which medications that were missed (spironolactone, metronidazole, carvedilol, multivitamin, ursodiol, mesalamine, and furosemide) to administer to patient since these medications were missed during ERCP.    Provider called at [phone] indicating that all medications could be given.

## 2021-05-10 NOTE — PROGRESS NOTES
"[facility]  HEPATOLOGY PROGRESS NOTE  Berta Nava [id number]       CC: fevers, lethargy  SUBJECTIVE:  Yesterday, was able to eat without nausea, vomiting or pain. Denies lethargy, fevers, chills or abdominal pain. She understands procedure and ultimate goals.     ROS:  A 10-point review of systems was negative.    OBJECTIVE:  VS: /86 (BP Location: Left arm)   Pulse 86   Temp 98.6  F (37  C) (Oral)   Resp 16   Ht 1.651 m (5' 5\")   Wt 53.7 kg (118 lb 6.4 oz)   SpO2 100%   BMI 19.70 kg/m       Gross per 24 hour   Intake 460 ml   Output --   Net 460 ml     General: In no acute distress, mild facial muscle wasting  Neuro: AOx3, No asterixis  Lymph: No cervical lymphadenopathy  HEENT:  Noscleral icterus, Nooral lesions  CV: S1/A0nerfeef murmurs. Skin warm and dry  Lungs: clear to auscultation, diminished bases Respirations even and nonlabored on room air  Abd:  Mildly distended, nontender    Extrem: Noperipheral edema   Skin: mild jaundice  Psych:affect and mood normal    MEDICATIONS:  Current Facility-Administered Medications   Medication     [Auto Hold] bisacodyl (DULCOLAX) Suppository 10 mg     [Auto Hold] carvedilol (COREG) tablet 6.25 mg     [Auto Hold] cefTRIAXone (ROCEPHIN) 2 g vial to attach to  ml bag for ADULTS or NS 50 ml bag for PEDS     [Auto Hold] furosemide (LASIX) tablet 20 mg     [Auto Hold] heparin lock flush 10 UNIT/ML injection 2-5 mL     [Auto Hold] heparin lock flush 10 UNIT/ML injection 5-10 mL     [Auto Hold] heparin lock flush 10 UNIT/ML injection 5-10 mL     [Auto Hold] lidocaine (LMX4) cream     [Auto Hold] lidocaine (LMX4) cream     [Auto Hold] lidocaine 1 % 0.1-1 mL     [Auto Hold] lidocaine 1 % 0.1-5 mL     May continue current IV fluids if patient has IV fluids infusing.     [Auto Hold] melatonin tablet 3 mg     [Auto Hold] mesalamine (LIALDA) EC tablet 2,400 mg     [Auto Hold] metroNIDAZOLE (FLAGYL) tablet 500 mg     [Auto Hold] multivitamin w/minerals " (THERA-VIT-M) tablet 1 tablet     [Auto Hold] ondansetron (ZOFRAN-ODT) ODT tab 4 mg    Or     [Auto Hold] ondansetron (ZOFRAN) injection 4 mg     [Auto Hold] polyethylene glycol (MIRALAX) Packet 17 g     [Auto Hold] potassium chloride ER (MICRO-K) CR capsule 10 mEq     [Auto Hold] senna-docusate (SENOKOT-S/PERICOLACE) 8.6-50 MG per tablet 2 tablet     [Auto Hold] simvastatin (ZOCOR) tablet 20 mg     [Auto Hold] sodium chloride (PF) 0.9% PF flush 10-20 mL     [Auto Hold] sodium chloride (PF) 0.9% PF flush 3 mL     [Auto Hold] sodium chloride (PF) 0.9% PF flush 3 mL     [Auto Hold] sodium chloride (PF) 0.9% PF flush 3 mL     [Auto Hold] sodium chloride (PF) 0.9% PF flush 5-50 mL     [Auto Hold] spironolactone (ALDACTONE) tablet 50 mg     [Auto Hold] ursodiol (ACTIGALL) capsule 300 mg       REVIEW OF LABORATORY, PATHOLOGY AND IMAGING RESULTS:  BMP  Recent Labs   Lab 05/10/21  0629 05/09/21  0649 05/08/21  0533 05/07/21  0543    138 134 139   POTASSIUM 4.0 3.8 4.1 3.8   CHLORIDE 112* 112* 109 112*   MARIELENA 9.0 8.7 8.8 8.7   CO2 18* 20 19* 20   BUN 21 17 19 19   CR 0.51* 0.45* 0.48* 0.57   GLC 85 87 136* 85     CBC  Recent Labs   Lab 05/10/21  0629 05/09/21  0649 05/08/21  0533 05/07/21  0543   WBC 13.3* 21.9* 11.1* 16.4*   RBC 3.70* 3.31* 3.49* 3.30*   HGB 11.5* 10.1* 10.3* 10.0*   HCT 34.6* 31.0* 31.6* 30.6*   MCV 94 94 91 93   MCH 31.1 30.5 29.5 30.3   MCHC 33.2 32.6 32.6 32.7   RDW 17.5* 16.9* 16.7* 16.7*    263 212 207     INR  Recent Labs   Lab 05/10/21  0629 05/09/21  0649 05/08/21  0533 05/07/21  0543   INR 1.38* 1.41* 1.48* 1.36*     LFTs  Recent Labs   Lab 05/10/21  0629 05/09/21  0649 05/08/21  0533 05/07/21  0543   ALKPHOS 529* 538* 672* 671*   AST 69* 62* 67* 93*   ALT 67* 69* 77* 90*   BILITOTAL 1.8* 1.3 1.9* 1.5*   PROTTOTAL 6.2* 5.8* 6.1* 6.0*   ALBUMIN 2.0* 1.9* 2.0* 2.0*      PANCNo lab results found in last 7 days.   MELD-Na score: 12 at 5/10/2021  6:29 AM  MELD score: 12 at 5/10/2021  6:29  AM  Calculated from:  Serum Creatinine: 0.51 mg/dL (Rounded to 1 mg/dL) at 5/10/2021  6:29 AM  Serum Sodium: 139 mmol/L (Rounded to 137 mmol/L) at 5/10/2021  6:29 AM  Total Bilirubin: 1.8 mg/dL at 5/10/2021  6:29 AM  INR(ratio): 1.38 at 5/10/2021  6:29 AM  Age: 55 years 8 months      Imaging Results:  CT abd w 5/8/21  IMPRESSION:   1.  Cirrhotic liver with multifocal dilatation of the biliary ducts  containing sludge and large communicating cystic areas in the caudate  lobe and segment 7, consistent with the known diagnosis of PSC.  The  large cystic areas in the caudate lobe, segment 5 and 7 may represent  cholangitic abscesses given the rapid increase in size from 4/22/2021.  The segment 5 and caudate lobe ducts appear not to communicate with  the stented right hepatic duct.   2.  Sequela of portal portal hypertension including portosystemic  collaterals and increased large volume ascites.  3.  Right hepatic biliary stent in place. Previous left hepatic duct  stent removed. Interval development of pneumobilia and air within  gallbladder likely relates to the recent endoscopic procedure.  4.  Indeterminate left adrenal nodule, characterized as cyst on prior  MRI.     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 She was started on Zosyn and transitioned to ceftriaxone. Initial ERCP on 5/3 did have large stones and partially occluded stents which was exchanged.     RECOMMENDATIONS:  1. PSC  2. Biliary strictures/stones  3. Hepatic abscess  - will need mammogram as outpatient to complete tx eval.  MELD 12, ABO AB  - ERCP  5/5 with paritally occluded stent, duct swept with casts and stones.  Her hepatic abscess initially was decreasing in size on US. She had an increase of her WBC over the weekend and abd CT with increased size of the prior abscess. Plan for repeat ERCP today. Internal drainage preferred instead of percutaneous drain given cirrhosis and ascites/recent SBP.   - has been ursodiol 300 mg BID  - US 5/4 without evidence of HCC  - followed by ID and will need IV abx at home.      4. SBP  5. Ascites  - initial para on 4/29 with 1094 PMN's, culture negative. Received 75 gm Albumin on 4/29, 50 gms on 5/3.   - now on ceftriaxone Will need SBP ppx with ciprofloxacin 500 mg daily when abx completed.   - para as needed- has outpatient scheduled Frday  - has been on low dose diuretics with lasix 20 mg daily and spironolactone 50 mg daily. Waiting on increasing due to recent abscess and sepsis.     6. Esophageal varices  - EGD 12/1/20 with gr II-III EV. Not banded at that time. Changed nadolol to carvedilol 6.25 BID. This may need to be stopped due to worsening ascites  - large gr III EV seen on EGD, not banded.   - no current signs of bleeding.     7. Strep mitis bacteremia with sepsis- resolved  - strep mitis + on 4/29, follow up negative 5/1, 5/3. pansensitive  - has been on zosyn since 4/29-5/3. Ceftriaxone started 5/4  - echocardiogram negative for vegetations  -  Dental CT with caries, advised she needs to visit with dentist following discharge. No abscess.      8. Immunizations  - please start HBV/HAV vaccination prior to discharge.     Patient was discussed with attending physician, Dr. Emma Mao    Next follow up appointment: Dr. Leventhal 6/21    Thank you for the opportunity to be involved with  Berta Nava [facility]. Please call with any questions or concerns.    Andria Rowe, APRN, CNP  Inpatient Hepatology [place]  Text Link

## 2021-05-10 NOTE — PROGRESS NOTES
Pt A/Ox4  Denies pain  VSS RA  Pt was transported to  around 9:30 am for ERCP. Morning meds on hold (please administer meds when pt returns back to )  COVID-19: neg

## 2021-05-11 ENCOUNTER — COMMITTEE REVIEW (OUTPATIENT)
Dept: TRANSPLANT | Facility: CLINIC | Age: 56
End: 2021-05-11

## 2021-05-11 DIAGNOSIS — K74.60 CIRRHOSIS OF LIVER (H): Primary | ICD-10-CM

## 2021-05-11 LAB
ALBUMIN SERPL-MCNC: 2 G/DL (ref 3.4–5)
ALP SERPL-CCNC: 479 U/L (ref 40–150)
ALT SERPL W P-5'-P-CCNC: 57 U/L (ref 0–50)
ANION GAP SERPL CALCULATED.3IONS-SCNC: 8 MMOL/L (ref 3–14)
AST SERPL W P-5'-P-CCNC: 55 U/L (ref 0–45)
BASOPHILS # BLD AUTO: 0 10E9/L (ref 0–0.2)
BASOPHILS NFR BLD AUTO: 0.1 %
BILIRUB SERPL-MCNC: 1.6 MG/DL (ref 0.2–1.3)
BUN SERPL-MCNC: 30 MG/DL (ref 7–30)
CALCIUM SERPL-MCNC: 8.6 MG/DL (ref 8.5–10.1)
CHLORIDE SERPL-SCNC: 108 MMOL/L (ref 94–109)
CO2 SERPL-SCNC: 18 MMOL/L (ref 20–32)
CREAT SERPL-MCNC: 0.65 MG/DL (ref 0.52–1.04)
DIFFERENTIAL METHOD BLD: ABNORMAL
EOSINOPHIL # BLD AUTO: 0 10E9/L (ref 0–0.7)
EOSINOPHIL NFR BLD AUTO: 0 %
ERYTHROCYTE [DISTWIDTH] IN BLOOD BY AUTOMATED COUNT: 17.5 % (ref 10–15)
GFR SERPL CREATININE-BSD FRML MDRD: >90 ML/MIN/{1.73_M2}
GLUCOSE SERPL-MCNC: 116 MG/DL (ref 70–99)
HCT VFR BLD AUTO: 30.4 % (ref 35–47)
HGB BLD-MCNC: 9.7 G/DL (ref 11.7–15.7)
IMM GRANULOCYTES # BLD: 0.1 10E9/L (ref 0–0.4)
IMM GRANULOCYTES NFR BLD: 0.7 %
INR PPP: 1.45 (ref 0.86–1.14)
LYMPHOCYTES # BLD AUTO: 0.9 10E9/L (ref 0.8–5.3)
LYMPHOCYTES NFR BLD AUTO: 5.1 %
MCH RBC QN AUTO: 29.3 PG (ref 26.5–33)
MCHC RBC AUTO-ENTMCNC: 31.9 G/DL (ref 31.5–36.5)
MCV RBC AUTO: 92 FL (ref 78–100)
MONOCYTES # BLD AUTO: 0.9 10E9/L (ref 0–1.3)
MONOCYTES NFR BLD AUTO: 5 %
NEUTROPHILS # BLD AUTO: 16.3 10E9/L (ref 1.6–8.3)
NEUTROPHILS NFR BLD AUTO: 89.1 %
NRBC # BLD AUTO: 0 10*3/UL
NRBC BLD AUTO-RTO: 0 /100
PLATELET # BLD AUTO: 264 10E9/L (ref 150–450)
POTASSIUM SERPL-SCNC: 4.1 MMOL/L (ref 3.4–5.3)
PROT SERPL-MCNC: 5.8 G/DL (ref 6.8–8.8)
RBC # BLD AUTO: 3.31 10E12/L (ref 3.8–5.2)
SODIUM SERPL-SCNC: 134 MMOL/L (ref 133–144)
WBC # BLD AUTO: 18.3 10E9/L (ref 4–11)

## 2021-05-11 PROCEDURE — 250N000013 HC RX MED GY IP 250 OP 250 PS 637: Performed by: INTERNAL MEDICINE

## 2021-05-11 PROCEDURE — G0010 ADMIN HEPATITIS B VACCINE: HCPCS | Performed by: INTERNAL MEDICINE

## 2021-05-11 PROCEDURE — 80053 COMPREHEN METABOLIC PANEL: CPT | Performed by: INTERNAL MEDICINE

## 2021-05-11 PROCEDURE — 85610 PROTHROMBIN TIME: CPT | Performed by: INTERNAL MEDICINE

## 2021-05-11 PROCEDURE — 250N000011 HC RX IP 250 OP 636: Performed by: INTERNAL MEDICINE

## 2021-05-11 PROCEDURE — 99233 SBSQ HOSP IP/OBS HIGH 50: CPT | Mod: GC | Performed by: INTERNAL MEDICINE

## 2021-05-11 PROCEDURE — 99232 SBSQ HOSP IP/OBS MODERATE 35: CPT | Performed by: NURSE PRACTITIONER

## 2021-05-11 PROCEDURE — 90746 HEPB VACCINE 3 DOSE ADULT IM: CPT | Performed by: INTERNAL MEDICINE

## 2021-05-11 PROCEDURE — 85025 COMPLETE CBC W/AUTO DIFF WBC: CPT | Performed by: INTERNAL MEDICINE

## 2021-05-11 PROCEDURE — 120N000011 HC R&B TRANSPLANT UMMC

## 2021-05-11 PROCEDURE — 36415 COLL VENOUS BLD VENIPUNCTURE: CPT | Performed by: INTERNAL MEDICINE

## 2021-05-11 PROCEDURE — 99232 SBSQ HOSP IP/OBS MODERATE 35: CPT | Performed by: INTERNAL MEDICINE

## 2021-05-11 RX ORDER — CEFTRIAXONE 2 G/1
2 INJECTION, POWDER, FOR SOLUTION INTRAMUSCULAR; INTRAVENOUS EVERY 24 HOURS
Qty: 700 ML | Refills: 0 | Status: SHIPPED | OUTPATIENT
Start: 2021-05-11 | End: 2021-05-11

## 2021-05-11 RX ORDER — METRONIDAZOLE 500 MG/1
500 TABLET ORAL 3 TIMES DAILY
Qty: 105 TABLET | Refills: 0 | Status: ON HOLD | OUTPATIENT
Start: 2021-05-11 | End: 2021-06-12

## 2021-05-11 RX ORDER — CEFTRIAXONE 2 G/1
2 INJECTION, POWDER, FOR SOLUTION INTRAMUSCULAR; INTRAVENOUS EVERY 24 HOURS
Qty: 700 ML | Refills: 0 | Status: ON HOLD | OUTPATIENT
Start: 2021-05-11 | End: 2021-06-12

## 2021-05-11 RX ORDER — METRONIDAZOLE 500 MG/1
500 TABLET ORAL 3 TIMES DAILY
Qty: 105 TABLET | Refills: 0 | Status: SHIPPED | OUTPATIENT
Start: 2021-05-11 | End: 2021-05-11

## 2021-05-11 RX ADMIN — URSODIOL 300 MG: 300 CAPSULE ORAL at 08:02

## 2021-05-11 RX ADMIN — Medication 1 TABLET: at 08:03

## 2021-05-11 RX ADMIN — Medication 5 ML: at 11:21

## 2021-05-11 RX ADMIN — SIMVASTATIN 20 MG: 5 TABLET, FILM COATED ORAL at 21:43

## 2021-05-11 RX ADMIN — FUROSEMIDE 20 MG: 20 TABLET ORAL at 08:02

## 2021-05-11 RX ADMIN — METRONIDAZOLE 500 MG: 500 TABLET ORAL at 08:04

## 2021-05-11 RX ADMIN — MESALAMINE 2400 MG: 1.2 TABLET, DELAYED RELEASE ORAL at 08:03

## 2021-05-11 RX ADMIN — CARVEDILOL 6.25 MG: 6.25 TABLET, FILM COATED ORAL at 08:03

## 2021-05-11 RX ADMIN — HEPATITIS B VACCINE (RECOMBINANT) 20 MCG: 20 INJECTION, SUSPENSION INTRAMUSCULAR at 21:45

## 2021-05-11 RX ADMIN — CARVEDILOL 6.25 MG: 6.25 TABLET, FILM COATED ORAL at 17:47

## 2021-05-11 RX ADMIN — POTASSIUM CHLORIDE 10 MEQ: 750 CAPSULE, EXTENDED RELEASE ORAL at 21:43

## 2021-05-11 RX ADMIN — METRONIDAZOLE 500 MG: 500 TABLET ORAL at 14:20

## 2021-05-11 RX ADMIN — CEFTRIAXONE 2 G: 2 INJECTION, POWDER, FOR SOLUTION INTRAMUSCULAR; INTRAVENOUS at 10:00

## 2021-05-11 RX ADMIN — URSODIOL 300 MG: 300 CAPSULE ORAL at 19:28

## 2021-05-11 RX ADMIN — DOCUSATE SODIUM 50 MG AND SENNOSIDES 8.6 MG 2 TABLET: 8.6; 5 TABLET, FILM COATED ORAL at 08:06

## 2021-05-11 RX ADMIN — METRONIDAZOLE 500 MG: 500 TABLET ORAL at 19:28

## 2021-05-11 RX ADMIN — SPIRONOLACTONE 50 MG: 50 TABLET, FILM COATED ORAL at 08:02

## 2021-05-11 ASSESSMENT — ACTIVITIES OF DAILY LIVING (ADL)
ADLS_ACUITY_SCORE: 12
ADLS_ACUITY_SCORE: 12
ADLS_ACUITY_SCORE: 13
ADLS_ACUITY_SCORE: 13
ADLS_ACUITY_SCORE: 12
ADLS_ACUITY_SCORE: 13

## 2021-05-11 NOTE — PLAN OF CARE
[phone] Nursing Shift Report  Berta appeared to sleep soundly between cares throughout the night. VSS, states that she's feeling pretty positive that she will be discharging today.  VSS has been stable and has been on RA, dc'd capno and removed pneumo boots per patient's request. Will continue to monitor and report any significant changes.

## 2021-05-11 NOTE — COMMITTEE REVIEW
Abdominal Committee Review Note     Evaluation Date: 3/23/2021  Committee Review Date: 5/11/2021    Organ being evaluated for: Liver    Transplant Phase: Evaluation  Transplant Status: Active    Transplant Coordinator: Wilmer Lazar Jr.  Transplant Surgeon:   Ernesto Schmitt    Referring Physician: Venus London    Primary Diagnosis: Primary Sclerosing Cholangitis: Ulcerative Colitis  Secondary Diagnosis:     Committee Review Members:  Hepatology Emma Mao MD   Nutrition Vania Cornejo,    Pharmacy Sanjay Huston, [facility]    - Clinical Helene Denney, [facility], Katie Mejia, [facility]   Transplant Nasir Rojas MD, Marjorie Reyes, RN, Slick Kidd MD, Scott Puri MD, Jr Wilmer Lazar RN, Andria Rowe, APRN CNP, Sue Romero, RN, Fabio Bernabe MD, Fabio Gotti MD, Ricardo Contreras MD, Hussain Bernard MD, Ernesto Schmitt MD, Taqueria Peterson MD, Sri Wilks MD       Transplant Eligibility: PSC, Cirrhosis with MELD    Committee Review Decision: Needs Re-presentation    Relative Contraindications: Other, pending eval    Absolute Contraindications: None    Committee Chair Emma Mao MD verbally attested to the committee's decision.    Committee Discussion Details:     Re-discuss next week with Dr. Leventhal available     Dental & Mammogram scheduled as outpatient

## 2021-05-11 NOTE — PROGRESS NOTES
"[facility]  HEPATOLOGY PROGRESS NOTE  Berta Nava [id number]       CC: fevers, lethargy  SUBJECTIVE:  States she continues to feel well and denies fevers, abdominal pain, nausea or vomiting. She has been able to eat but only slightly limited due to abdominal distention from ascites.     ROS:  A 10-point review of systems was negative.    OBJECTIVE:  VS: /70 (BP Location: Left arm)   Pulse 70   Temp 98.5  F (36.9  C) (Oral)   Resp 18   Ht 1.651 m (5' 5\")   Wt 53.7 kg (118 lb 6.4 oz)   SpO2 97%   BMI 19.70 kg/m       Gross per 24 hour   Intake 420 ml   Output 0 ml   Net 420 ml     General: In no acute distress, mild facial muscle wasting  Neuro: AOx3, No asterixis  Lymph: No cervical lymphadenopathy  HEENT:  Noscleral icterus, Nooral lesions  CV: S1/E6yusdwrc murmurs. Skin warm and dry  Lungs: clear to auscultation, diminished bases Respirations even and nonlabored on room air  Abd:  Moderately distended, nontender    Extrem: Noperipheral edema   Skin: mild jaundice  Psych:affect and mood normal    MEDICATIONS:  Current Facility-Administered Medications   Medication     bisacodyl (DULCOLAX) Suppository 10 mg     carvedilol (COREG) tablet 6.25 mg     cefTRIAXone (ROCEPHIN) 2 g vial to attach to  ml bag for ADULTS or NS 50 ml bag for PEDS     furosemide (LASIX) tablet 20 mg     heparin lock flush 10 UNIT/ML injection 5-10 mL     heparin lock flush 10 UNIT/ML injection 5-10 mL     lidocaine (LMX4) cream     lidocaine 1 % 0.1-1 mL     May continue current IV fluid if patient has IV fluids infusing until discharge.     May continue current IV fluids if patient has IV fluids infusing.     melatonin tablet 3 mg     mesalamine (LIALDA) EC tablet 2,400 mg     metroNIDAZOLE (FLAGYL) tablet 500 mg     multivitamin w/minerals (THERA-VIT-M) tablet 1 tablet     naloxone (NARCAN) injection 0.2 mg     naloxone (NARCAN) injection 0.2 mg     naloxone (NARCAN) injection 0.4 mg     naloxone (NARCAN) injection " 0.4 mg     ondansetron (ZOFRAN-ODT) ODT tab 4 mg    Or     ondansetron (ZOFRAN) injection 4 mg     polyethylene glycol (MIRALAX) Packet 17 g     potassium chloride ER (MICRO-K) CR capsule 10 mEq     senna-docusate (SENOKOT-S/PERICOLACE) 8.6-50 MG per tablet 2 tablet     simvastatin (ZOCOR) tablet 20 mg     sodium chloride (PF) 0.9% PF flush 10-20 mL     sodium chloride (PF) 0.9% PF flush 3 mL     sodium chloride (PF) 0.9% PF flush 3 mL     sodium chloride (PF) 0.9% PF flush 3 mL     sodium chloride (PF) 0.9% PF flush 3 mL     spironolactone (ALDACTONE) tablet 50 mg     ursodiol (ACTIGALL) capsule 300 mg       REVIEW OF LABORATORY, PATHOLOGY AND IMAGING RESULTS:  BMP  Recent Labs   Lab 05/11/21  0724 05/10/21  0629 05/09/21  0649 05/08/21  0533    139 138 134   POTASSIUM 4.1 4.0 3.8 4.1   CHLORIDE 108 112* 112* 109   MARIELENA 8.6 9.0 8.7 8.8   CO2 18* 18* 20 19*   BUN 30 21 17 19   CR 0.65 0.51* 0.45* 0.48*   * 85 87 136*     CBC  Recent Labs   Lab 05/11/21  0724 05/10/21  0629 05/09/21  0649 05/08/21  0533   WBC 18.3* 13.3* 21.9* 11.1*   RBC 3.31* 3.70* 3.31* 3.49*   HGB 9.7* 11.5* 10.1* 10.3*   HCT 30.4* 34.6* 31.0* 31.6*   MCV 92 94 94 91   MCH 29.3 31.1 30.5 29.5   MCHC 31.9 33.2 32.6 32.6   RDW 17.5* 17.5* 16.9* 16.7*    293 263 212     INR  Recent Labs   Lab 05/11/21  0724 05/10/21  0629 05/09/21  0649 05/08/21  0533   INR 1.45* 1.38* 1.41* 1.48*     LFTs  Recent Labs   Lab 05/11/21  0724 05/10/21  0629 05/09/21  0649 05/08/21  0533   ALKPHOS 479* 529* 538* 672*   AST 55* 69* 62* 67*   ALT 57* 67* 69* 77*   BILITOTAL 1.6* 1.8* 1.3 1.9*   PROTTOTAL 5.8* 6.2* 5.8* 6.1*   ALBUMIN 2.0* 2.0* 1.9* 2.0*      PANCNo lab results found in last 7 days.   MELD-Na score: 16 at 5/11/2021  7:24 AM  MELD score: 13 at 5/11/2021  7:24 AM  Calculated from:  Serum Creatinine: 0.65 mg/dL (Rounded to 1 mg/dL) at 5/11/2021  7:24 AM  Serum Sodium: 134 mmol/L at 5/11/2021  7:24 AM  Total Bilirubin: 1.6 mg/dL at 5/11/2021   7:24 AM  INR(ratio): 1.45 at 5/11/2021  7:24 AM  Age: 55 years 8 months      Imaging Results:  ERCP 5/10/21  Impression:          - One stent from the biliary tree was seen in the major                        papilla. Removed. This stent was previously thought to                        be in the left intrahepatic but on CT it was actually in                        the right posterior duct. Patient has a shrunken/rotated                        liver making fluoroscopic ductal anatomy interpretation                        more challenging.                        - Cholangiogram again showed diffusely irregular biliary                        tree with paucity/loss of multiple tertiary branches                        consistent with PSC/cirrhosis. A peripheral branch off                        of right anterior? contiained a saccular dilation with                        multiple filling defects/stones that could not be                        accessed. The ~2 cm collection/abscess off of the right                        posterior duct was again visualized and could not be                        accessed. More cephlad collection seen on CT did not                        fill.                        - Cystic duct/gallbladder opacified                        - Residual stones in the right posterior duct and left                        intrahepatic duct cleared with balloon dilation and                        balloon/basket sweeping. Large amount of stone,                        sludge/cast-like material with associated pus removed                        - One 10 Fr x 22 cm Johlin stent placed into the right                        posterior intrahepatic biliary tree                        - One 8.5 Fr x 20 cm Johlin stent placed into left                        intrahepatic duct                        - Grade III esophageal varices. 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 She was started on Zosyn and transitioned to ceftriaxone. Initial ERCP on 5/3 did have large stones and partially occluded stents which was exchanged. Repeat ERCP 5/10 with exchange of biliary stent with attempts at access of abscess area.     RECOMMENDATIONS:  1. PSC  2. Biliary strictures/stones  3. Hepatic abscess  - will need mammogram as outpatient to complete tx eval- she has scheduled.  MELD 16, ABO AB  - ERCP 5/10 with paritally occluded stent, duct swept with casts and stones.  Hepatic abscess likely accessed with internal drainage. Will plan to continue with abx per ID and repeat ERCP in 1 month. She did have an increase in her WBC without symptoms and decreasing alk phos and bili.    - has been ursodiol 300 mg BID  - US 5/4 without evidence of HCC  - followed by ID and will need IV abx at home.      4. SBP  5. Ascites  - initial para on 4/29 with 1094 PMN's, culture negative. Received 75 gm Albumin on 4/29, 50 gms on 5/3.   - now on ceftriaxone Will need SBP ppx with ciprofloxacin 500 mg daily when abx completed.   - para as needed- has outpatient scheduled Frday  - has been on low dose diuretics with lasix 20 mg daily and spironolactone 50 mg daily. Continuing to hold increasing due to current infection/abscess.      6. Esophageal varices  - EGD 12/1/20 with gr II-III EV. Repeat ERCP's continue to see gr III EV and not banded due to potential for repeat ERCP's and potential for  cholangitis. On carvedilol 6.25 BID. If she continues to have LVP for ascites, this may need to be stopped.   - no current signs of bleeding.     7. Strep mitis bacteremia with sepsis- resolved  - strep mitis + on 4/29, follow up negative 5/1, 5/3. pansensitive  - has been on zosyn since 4/29-5/3. Ceftriaxone started 5/4  - echocardiogram negative for vegetations  -  Dental CT with caries, advised she needs to visit with dentist following discharge. No abscess.      8. Immunizations  - please start HBV/HAV vaccination prior to discharge.     Patient was discussed with attending physician, Dr. Emma Mao    Next follow up appointment: Dr. Leventhal 6/21    Thank you for the opportunity to be involved with  [facility]. Please call with any questions or concerns.    CHELSEY Leos, CNP  Inpatient Hepatology [place]  Text Link

## 2021-05-11 NOTE — PLAN OF CARE
"/70 (BP Location: Left arm)   Pulse 70   Temp 98.5  F (36.9  C) (Oral)   Resp 18   Ht 1.651 m (5' 5\")   Wt 53.7 kg (118 lb 6.4 oz)   SpO2 97%   BMI 19.70 kg/m     Neuro: A/Ox4  VS: VSS on RA  Pain: Denies  GI: on low fat diet and tolerating good. Denies nausea. Small BM  : Voiding without difficulty  R arm PICC hep locked  Skin: Jaundiced/intact  Activity - Independent  Education - Anti biotherapy  Plan of Care - possible PARA this evening. Continue with POC    "

## 2021-05-11 NOTE — PROGRESS NOTES
CLINICAL NUTRITION SERVICES - REASSESSMENT NOTE     Nutrition Prescription    RECOMMENDATIONS FOR MDs/PROVIDERS TO ORDER:  Adjust diet back to Low Sodium (2g) if appropriate.     Malnutrition Status:    Non-severe malnutrition in the context of chronic illness    Recommendations already ordered by Registered Dietitian (RD):  Change oral supplement to vanilla Ensure Enlive at HS snack time    Future/Additional Recommendations:  Continue low sodium diet  Encourage small/frequent meals with focus on protein sources  Encourage evening snack with CHO/protein  Oral supplements as tolerated      EVALUATION OF THE PROGRESS TOWARD GOALS   Diet: Low Fat + Ensure Enlive Shake at HS snack time     Intake: Patient has mainly been eating % of meals recorded with fair-good appetite noted by nursing staff.  Some early satiety, but ordering normal size meals.  Has been drinking oral supplements, but she'd like to change the flavor/type.       NEW FINDINGS   Labs: Reviewed    Meds: Flagyl TID, Miralax BID, MVI with minerals, Senokot-S BID, Spironolactone, Lasix     Weight: Most recent weight (53.7 kg - 5/10) is significantly decreased from admission weight (71.9 kg - 5/3).  Most recent weight is the lowest weight patient has been that she can remember.     Updated Dosing Weight: 54 kg (actual wt)  ASSESSED NUTRITION NEEDS  Estimated Energy Needs: 6611-4516 kcals/day (25 - 30 kcals/kg)  Justification: Maintenance  Estimated Protein Needs: 65-81 grams protein/day (1.2- 1.5 grams of pro/kg)  Justification: Increased needs  Estimated Fluid Needs: per MD    MALNUTRITION  % Intake: Decreased intake does not meet criteria  % Weight Loss: Weight loss does not meet criteria - suspect related to fluid changes/diuresis  Subcutaneous Fat Loss: Facial region (orbital): mild, Lower arm: mild and Thoracic/intercostal: mild  Muscle Loss: Temporal:  mild, Facial & jaw region: moderate, Scapular bone: moderate, Upper arm (bicep, tricep):  moderate and Posterior calf: moderate  Fluid Accumulation/Edema: Mild  Malnutrition Diagnosis: Non-severe malnutrition in the context of chronic illness    Previous Goals   Patient to consume % of nutritionally adequate meal trays TID, or the equivalent with supplements/snacks.  Evaluation: Met    Previous Nutrition Diagnosis  Inadequate oral intake   Evaluation: Improving    CURRENT NUTRITION DIAGNOSIS  Predicted inadequate nutrient intake (kcal/pro) related to early satiety     INTERVENTIONS  Implementation  Medical food supplement therapy - adjusted oral supplements per patient's preference  Nutrition education for recommended modifications - reviewed protein sources with patient, discussed good food choices for home    Goals  Patient to consume % of nutritionally adequate meal trays TID, or the equivalent with supplements/snacks.    Monitoring/Evaluation  Progress toward goals will be monitored and evaluated per protocol.    Adelita De Luna MS, RD, LD  Pager [phone]

## 2021-05-11 NOTE — PROGRESS NOTES
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 Patient was found to have ascending cholangitis with secondary liver abscess and Streptococcus mitis bacteremia. She also developed SBP. She was initially started don zosyn on 4/30 and then transitioned to ceftriaxone on 5/4. Metronidazole was added on 5/7. She had a repeat paracentesis on 5/3 and 5/7 and both showed improvement of peritonitis. For management of the ascending cholangitis she underwent ERCP on 5/5/21 and biliary stents were exchanged. They did appreciate a 2 cm collection in proximity if the left intra-hepatic duct however they were not able to access it. Repeat abdominal US performed after the ERCP showed cystic lesion on the right hepatic duct (4cm x4.5 cmx 4.1 cm). IR was consulted for possible drainage and they recommended a CT abdomen/pelvis which showed cystic areas on segment 7 and 5. It was not clear if these cystic areas were biliary dilation from her diagnosis of PSC or real abscesses. IR evaluated and stated that they could not safely drain the collections. GI team performed a new ERCP on 5/10 and they again exchanged the drains in the left and right intra-hepatic ducts. They again  appreciated the 2 cm collection close to the biliary duct. They again were not able to drain. Patient is clinically stable and she is not afebrile. Her blood cultures cleared since 5/1/21. GI team is planning to perform a new ERCP in 1 month. Patient will complete her treatment for bacteremia on 5/15, however she will need to have her antibiotic treatment prolonged to treat the hepatic collection since IR and GI team were not able to safely drain it. Since follow up US and CT scans might confuse the picture (hard to determine what is abscess and what is biliary duct dilation given her diagnosis of PSC) it is best to follow up improvement of the collection with ERCP. I would recommend to continue antibiotics until follow up ERCP and if it shows significant improvement/resolution of infection then we can determine the end date of antibiotics. She had worse leukocytosis today. Although it could be related to the ERCP procedure yesterday, she will stay in hospital today and have follow up CBC tomorrow. LFTs are elevated (cirrhosis) but they are slightly down from yesteday.     Recommendations:     1. Please continue Metronidazole 500mg PO Q8H and Ceftriaxone 2g IV Q24H. On 5/15, the patient will complete her treatment for Streptococcus mitis bacteremia, however the antibiotics will need to be continued further for treatment of the liver collection. Duration will depend on resolution of liver abscess and this will be better determined at the time for the next ERCP (approximateky 4 weeks). I will place a tentative end date of antibiotics for June 14, 2021 (assuming that ERCP will be done before this date and once I have ERCP results I can determine final date of antibiotics). I will be seeing the patient in clinic in 2 weeks - on May 26, 2021.     2. While on antibiotics, please obtain at least weekly CBC, CMP and CRP (daily CBC and BMP while in hospital). When she is in the outpatient setting, the results will need to be  "faxed to the ID clinic ()     3. Follow up scheduled with me in the ID clinic for May 26 at 12:00pm (video visit)      4. PICC line placed                5. Appreciate continued GI follow up         Recommendations discussed with primary team    The General ID team will continue to follow this patient. Please feel free to call with any questions.     Gloria Morocho MD  Date of Service: 05/11/21  Pager: [phone]               Physical Exam:   /80   Pulse 63   Temp 97.8  F (36.6  C) (Oral)   Resp 16   Ht 1.651 m (5' 5\")   Wt 53.7 kg (118 lb 6.4 oz)   SpO2 99%   BMI 19.70 kg/m         Exam:  GENERAL: Not in acute distress.   HEAD: Normocephalic and atraumatic  ENT:  No hearing impairment, oral mucous membranes moist.  EYES:  Eyes grossly normal to inspection, PERRL and conjunctivae and sclerae normal   NECK:  Supple, no adenopathy  LUNGS:  Clear to auscultation - no rales, rhonchi or wheezes  CARDIOVASCULAR:  Regular rate and rhythm, normal S1 S2, no murmur  ABDOMEN:  Moderately distended (ascites)  EXT: Extremities warm and without edema.  MS: No gross musculoskeletal defects noted, no edema  SKIN:  No acute rashes or suspicious lesions  NEUROLOGIC:  Grossly nonfocal. Normal strength and tone, mentation intact and speech normal  PSYCHIATRIC: Mood stable, mentation appears normal, affect normal             Laboratory Data:     Creatinine   Date Value Ref Range Status   05/11/2021 0.65 0.52 - 1.04 mg/dL Final   05/10/2021 0.51 (L) 0.52 - 1.04 mg/dL Final   05/09/2021 0.45 (L) 0.52 - 1.04 mg/dL Final   05/08/2021 0.48 (L) 0.52 - 1.04 mg/dL Final   05/07/2021 0.57 0.52 - 1.04 mg/dL Final     WBC   Date Value Ref Range Status   05/11/2021 18.3 (H) 4.0 - 11.0 10e9/L Final   05/10/2021 13.3 (H) 4.0 - 11.0 10e9/L Final   05/09/2021 21.9 (H) 4.0 - 11.0 10e9/L Final   05/08/2021 11.1 (H) 4.0 - 11.0 10e9/L Final   05/07/2021 16.4 (H) 4.0 - 11.0 10e9/L Final     Hemoglobin   Date Value Ref Range Status "   05/11/2021 9.7 (L) 11.7 - 15.7 g/dL Final     Platelet Count   Date Value Ref Range Status   05/11/2021 264 150 - 450 10e9/L Final     Lab Results   Component Value Date     05/11/2021    BUN 30 05/11/2021    CO2 18 (L) 05/11/2021     No results found for: CRP        Pertinent Recent Microbiology Data:     Recent Labs   Lab 05/07/21  1350   CULT Culture negative monitoring continues  Culture negative monitoring continues   SDES Ascites Fluid  Ascites Fluid            Imaging:     Recent Results (from the past 48 hour(s))   XR Surgery CARRIE G/T 5 Min Fluoro w Stills    Narrative    This exam was marked as non-reportable because it will not be read by a   radiologist or a [place] non-radiologist provider.

## 2021-05-11 NOTE — PLAN OF CARE
"/69 (BP Location: Left leg)   Pulse 65   Temp 98.4  F (36.9  C) (Oral)   Resp 21   Ht 1.651 m (5' 5\")   Wt 53.7 kg (118 lb 6.4 oz)   SpO2 97%   BMI 19.70 kg/m      Shift: 3011-9203  VS: WDL on RA, afebrile  Neuro: Aox4  Behaviors: Pleasant, cooperative with cares, able to make her needs known  BG: NA  Labs: No new labs  Respiratory: Clear lung sounds, no shortness of breath.  Capnography on. EtCO2 23, IPI 8-9.  Cardiac: Regular rhythm and rate  Pain/Nausea/PRN: Denied pain and nausea  Diet: Clar liquid advanced to low fat diet...  LDA: double lumen right PICC, saline locked  Infusion(s): rocephin daily  GI/: Voided and small BM.  PRN Senna-docusate given per request.  Skin: Intact  Mobility: Standby assist to manage capno  Plan: Likely discharge 05/11 with home infusion care.  PICC education complete.      ERCP completed shortly before start of shift.    "

## 2021-05-11 NOTE — PROGRESS NOTES
This is a recent snapshot of the patient's [facility] medical record.  For current drug dose and complete information and questions, call [phone] or In Basket pool, [facility] home infusion ([id number])  CSN Number:  [id number]

## 2021-05-11 NOTE — PHARMACY
"[facility], [facility]  Parenteral ANtibiotic Review at Departure from Acute [facility]     Antimicrobial Stewardship Program - A joint venture between [facility] and  Physicians to optimize antibiotic management.  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 She has recently started to get paracenteses recently on 4/13 (5L removal). She went to an appointment for a scheduled paracentesis on 4/28 and was noted to be significantly hypotensive (SBPs reported to be as low as 60s) and so was sent to the ED. At time of presentation she had labs notable for WBC 43, Procal 5.41, Cr 2.28 (from normal bl), LFTs above baseline. She had a paracentesis done with WBC 1610 68% PMNS, cultures remain negative. Blood cultures positive for Streptococcus mitis. She was started on Zosyn. CT abdomen at that time was not particularly revealing. She was ultimately transferred for continued management. On 5/3, she underwent repeat paracentesis (, 31% PMNs). She underwent RUQ US on 5/4 which revealed, \"septated cystic collection in the right hepatic lobe demonstrated measuring up to 5.2 cm. This appears to have increased in size from MRI where there was a severely dilated duct in this region measuring up to 2.7 cm and correlates with a hypoattenuating area on CT.\" She was transitioned to ceftriaxone and metronidazole on 5/4 and has remained afebrile and vitally stable. She underwent ERCP on 5/5 and they removed an occluded " stent, a large amount of pus was evacuated, and a small 2cm collection off the L intrahepatic duct was identified but was not amenable to endoscopic intervention. Plans in place for patient to continue ceftriaxone and metronidazole as outpatient.      Antimicrobial Dose/Route/Frequency Duration/Indication Start Date End Date   Ceftriaxone 2 g/IV/every 24 hours TBD per ID provider/bacteremia, intra-abdominal infection, abscess(Strep mitis) 5/1/2021 TBD per ID provider   Metronidazole 500 mg/IV/every 8 hours TBD per ID provider/bacteremia, intra-abdominal infection, abscess(Strep mitis) 5/1/2021 TBD per ID provider     Laboratory Tests: CBC with Diff, CMP, CRP   Lab Monitoring Frequency: 1x week   Therapeutic Drug Monitoring: none     Therapeutic Drug Monitoring Frequency: none     Miscellaneous Drug Monitoring: repeat ERCP - needs to be scheduled  (tentatively planning in one month ~6/8)     Line Type: PICC(Double lumen, placed 5/8/2021)    Reassess Line/Pull Line Date: PICC pull line date to be determined by ID provider (see below)    First Dose Received in Controlled Setting: Yes    Designated Provider: Gloria Morocho MD    Follow-up: Patient does  have ID follow-up. Appointment date/time: 5/26/2021 @ 12:00 PM.    Recommendations/Additional Information:   Please fax laboratory results to [facility] ([phone]), attn: Dr. Bailee Singh, PharmD, BCIDP  Pager: [phone]    Vital Signs/Clinical Features:  Vitals         05/09 0700  -  05/10 0659 05/10 0700  -  05/11 0659 05/11 0700  -  05/11 1607   Most Recent    Temp ( F) 97.8 -  98.5    97.7 -  98.8    97.8 -  98.5     97.8 (36.6)    Pulse 63 -  71    65 -  89    63 -  70     63    Resp   16    16 -  22    16 -  18     16    /68 -  116/71    102/66 -  137/86    111/70 -  132/80     132/80    SpO2 (%) 98 -  99    97 -  100    97 -  99     99            Labs  Estimated Creatinine Clearance: 81 mL/min (based on SCr of 0.65 mg/dL).  Recent  Labs   Lab Test 05/06/21  0544 05/07/21  0543 05/08/21  0533 05/09/21  0649 05/10/21  0629 05/11/21  0724   CR 0.61 0.57 0.48* 0.45* 0.51* 0.65       Recent Labs   Lab Test 05/06/21  0544 05/07/21  0543 05/08/21  0533 05/09/21  0649 05/10/21  0629 05/11/21  0724   WBC 14.8* 16.4* 11.1* 21.9* 13.3* 18.3*   ANEU 13.4* 13.1* 10.4* 18.4* 10.0* 16.3*   ALYM 0.7* 1.7 0.5* 1.6 1.7 0.9   BRIGIDO 0.5 1.1 0.1 1.5* 1.2 0.9   AEOS 0.0 0.3 0.0 0.0 0.2 0.0   HGB 10.3* 10.0* 10.3* 10.1* 11.5* 9.7*   HCT 31.6* 30.6* 31.6* 31.0* 34.6* 30.4*   MCV 94 93 91 94 94 92    207 212 263 293 264       Recent Labs   Lab Test 05/06/21  0544 05/07/21  0543 05/08/21  0533 05/09/21  0649 05/10/21  0629 05/11/21  0724   BILITOTAL 1.9* 1.5* 1.9* 1.3 1.8* 1.6*   ALKPHOS 758* 671* 672* 538* 529* 479*   ALBUMIN 2.1* 2.0* 2.0* 1.9* 2.0* 2.0*   AST 69* 93* 67* 62* 69* 55*   ALT 89* 90* 77* 69* 67* 57*       Recent Labs   Lab Test 05/13/18  0935 04/29/21  0942 04/30/21  0455   PCAL  --   --  5.41*   LACT 1.4 1.7 1.1             Culture Results:  7-Day Micro Results       Procedure Component Value Units Date/Time    Cell count with differential fluid     Order Status: No result Lab Status: No result     Specimen: Ascites Fluid     fluid culture - Aerobic Bacterial     Order Status: No result Lab Status: No result     Specimen: Ascites Fluid     Legionella species PCR     Order Status: No result Lab Status: No result     Specimen: Bronch Lavage     Cell count with differential fluid [[id number]] Collected: 05/07/21 1350    Order Status: Completed Lab Status: Final result Updated: 05/07/21 2025    Specimen: Ascites      Body Fluid Analysis Source Ascites     Comment: Fluid        % Neutrophils Fluid 35 %      % Lymphocytes Fluid 28 %      % Other Cells Fluid 37 %      Comment: Other cells are Monocytes, Macrophages, and Mesothelial cells.        Color Fluid Yellow     Appearance Fluid Clear     WBC Fluid 180 /uL      Comment: No reference ranges have been  established.  This result should be interpreted   in the context of the patient's clinical condition and compared to   simultaneous measurement in the patient's blood.  Refer to Lab Guide for   specific interpretive guidelines.         fluid culture - Aerobic Bacterial [[id number]] Collected: 05/07/21 1350    Order Status: Completed Lab Status: Preliminary result Updated: 05/08/21 1315    Specimen: Ascites from Abdomen      Specimen Description Ascites Fluid     Culture Micro Culture negative monitoring continues    Anaerobic bacterial culture [[id number]] Collected: 05/07/21 1350    Order Status: Completed Lab Status: Preliminary result Updated: 05/08/21 0858    Specimen: Ascites from Abdomen      Specimen Description Ascites Fluid     Culture Micro Culture negative monitoring continues    Legionella species PCR     Order Status: Canceled Lab Status: No result     Specimen: Bronch Lavage             Recent Labs   Lab Test 04/29/21  1106   URINEPH 5.5   NITRITE Negative   LEUKEST Negative   WBCU 15*                   Recent Labs   Lab Test 05/16/18  1700   CDBPCT Negative       Imaging: Us Abdomen Limited    Result Date: 5/7/2021  EXAMINATION: US ABDOMEN COMPLETE   5/7/2021 9:09 AM  HISTORY: Evaluate for resolution of possible abscess after ERCP COMPARISON: Ultrasound abdomen 5/4/2021, 5/3/2021 TECHNIQUE: The abdomen was scanned in standard fashion with specialized ultrasound transducer(s) using both gray-scale and limited color Doppler techniques. FINDINGS: The liver has a diffusely coarsened echotexture and measures 15.5 cm in the craniocaudal dimension. Moderate ascites, slightly increased from previous. Septated cystic lesion in the posterior right hepatic lobe measuring 4.0 x 4.5 x 4.1 cm, previously 5.2 x 3.7 x 3.9 cm. The pancreas is not well visualized on this exam. Gallbladder appears enlarged with wall thickening measuring up to 5.5 mm. Sonographic Javier's sign is negative. There is intraluminal sludge but no  gallstones. The walls of the common bile duct are not well seen due to interval placement of common bile duct stent. The aorta and IVC are normal. The right kidney is partially obscured on today's exam.     IMPRESSION: 1. Cirrhotic changes of the liver. 2. Complex septated cystic lesion in the right hepatic lobe appears slightly smaller in size from previous exam. 3. The gallbladder appears enlarged with wall thickening and intraluminal sludge likely related to underlying liver disease. No focal tenderness to suggest acute cholecystitis. A common bile duct stent is visualized on today's exam. 3. Moderate volume ascites, slightly increased from previous exam. I have personally reviewed the examination and initial interpretation and I agree with the findings. MARTINEZ JAVED MD    Xr Surgery Melody Fluoro L/t 5 Min W Stills    Result Date: 5/5/2021  This exam was marked as non-reportable because it will not be read by a radiologist or a [place] non-radiologist provider.     Ct Abdomen Pelvis W Contrast    Result Date: 5/8/2021  EXAMINATION: CT ABDOMEN PELVIS W CONTRAST, 5/8/2021 8:50 AM TECHNIQUE:  Helical CT images from the lung bases through the symphysis pubis were obtained with contrast.  Coronal reformatted images were generated at a workstation for further assessment. Contrast: iopamidol (ISOVUE-370) solution 95 mL    COMPARISON: CT 4/29/2021 and MRI of 4/22/2021 HISTORY: Abdominal abscess/infection suspected FINDINGS: Abdomen and pelvis: Liver: Significantly cirrhotic morphology of the liver similar to prior. Previously demonstrated multifocal dilatation of the biliary ducts containing sludge and large communicating cystic areas in the caudate lobe and segments 5 and 7 which are stable compared to CT from 4/29/2021 but increased in size compared to MRI from 4/22/2021. For example cystic area in segment 7 measures 6.2 cm compared to 2.4 cm on 4/22/2021 MRI. Redemonstration of right hepatic biliary stent  terminating in the duodenum. Previous left hepatic stent removed. Portal veins appear patent. New pneumobilia surrounding the biliary stent as well as extending into the cystic duct. Distended gallbladder with wall thickening and new development of air-fluid level. Spleen: Normal size. Pancreas: No suspicious pancreatic lesions. The pancreatic duct is not dilated. Adrenal glands: Left adrenal 1 cm nodule. Right adrenal gland is unremarkable. Kidneys: No kidney masses. No hydronephrosis or obstructing renal stones. Bladder / Pelvic organs: Unremarkable. Bowel: No bowel distention. Mild wall thickening of the small bowel and sigmoid colon likely relates to portal hypertensive enteropathy/colopathy. Lymph nodes: No retroperitoneal, mesenteric, or pelvic lymphadenopathy. Fluid: Diffuse large volume ascites increased from prior. Vessels: No infrarenal aortic aneurysm. Diffuse portosystemic collaterals. Lung bases: No consolidation or pleural effusion. Bones and soft tissues: No suspicious osseous lesions.     IMPRESSION: 1.  Cirrhotic liver with multifocal dilatation of the biliary ducts containing sludge and large communicating cystic areas in the caudate lobe and segment 7, consistent with the known diagnosis of PSC.  The large cystic areas in the caudate lobe, segment 5 and 7 may represent cholangitic abscesses given the rapid increase in size from 4/22/2021. The segment 5 and caudate lobe ducts appear not to communicate with the stented right hepatic duct. 2.  Sequela of portal portal hypertension including portosystemic collaterals and increased large volume ascites. 3.  Right hepatic biliary stent in place. Previous left hepatic duct stent removed. Interval development of pneumobilia and air within gallbladder likely relates to the recent endoscopic procedure. 4.  Indeterminate left adrenal nodule, characterized as cyst on prior MRI. I have personally reviewed the examination and initial interpretation and I agree  with the findings. MARTINEZ JAVED MD    Ct Dental Wo Contrast    Result Date: 5/5/2021  CT DENTAL WO CONTRAST 5/5/2021 10:53 AM History:  evaluate for abscess given strep mitis bacteremia Comparison:  None available  TECHNIQUE: 3-D reconstruction by the technologists, with curved multiplanar reformat of thin section imaging through the mandible and maxilla obtained without intravenous contrast. FINDINGS: No significant soft tissue swelling or mass. Normal facial bone alignment. No bony erosion. Large dental caries of left maxillary second premolar without significant periapical disease. The orbits are unremarkable. Visualized portions of the paranasal sinuses are clear. Degenerative changes of the right temporomandibular joint with mild mandibular head subchondral erosion. The left temporomandibular joint is normal.     IMPRESSION: No periapical abscess. Large dental caries of the left maxillary second premolar. I have personally reviewed the examination and initial interpretation and I agree with the findings. HAZEL PAK MD    Xr Surgery Melody G/t 5 Min Fluoro W Stills    Result Date: 5/10/2021  This exam was marked as non-reportable because it will not be read by a radiologist or a [place] non-radiologist provider.

## 2021-05-12 ENCOUNTER — PREP FOR PROCEDURE (OUTPATIENT)
Dept: GASTROENTEROLOGY | Facility: CLINIC | Age: 56
End: 2021-05-12

## 2021-05-12 ENCOUNTER — HOME INFUSION (PRE-WILLOW HOME INFUSION) (OUTPATIENT)
Dept: PHARMACY | Facility: CLINIC | Age: 56
End: 2021-05-12

## 2021-05-12 ENCOUNTER — PATIENT OUTREACH (OUTPATIENT)
Dept: CARE COORDINATION | Facility: CLINIC | Age: 56
End: 2021-05-12

## 2021-05-12 VITALS
BODY MASS INDEX: 19.73 KG/M2 | OXYGEN SATURATION: 97 % | RESPIRATION RATE: 16 BRPM | HEART RATE: 80 BPM | HEIGHT: 65 IN | TEMPERATURE: 98.4 F | WEIGHT: 118.4 LBS | SYSTOLIC BLOOD PRESSURE: 113 MMHG | DIASTOLIC BLOOD PRESSURE: 71 MMHG

## 2021-05-12 DIAGNOSIS — Z46.89 ENCOUNTER FOR REPLACEMENT OF BILIARY STENT: Primary | ICD-10-CM

## 2021-05-12 LAB
ALBUMIN SERPL-MCNC: 2 G/DL (ref 3.4–5)
ALP SERPL-CCNC: 398 U/L (ref 40–150)
ALT SERPL W P-5'-P-CCNC: 53 U/L (ref 0–50)
ANION GAP SERPL CALCULATED.3IONS-SCNC: 6 MMOL/L (ref 3–14)
AST SERPL W P-5'-P-CCNC: 57 U/L (ref 0–45)
BACTERIA SPEC CULT: NO GROWTH
BASOPHILS # BLD AUTO: 0 10E9/L (ref 0–0.2)
BASOPHILS NFR BLD AUTO: 0.2 %
BILIRUB SERPL-MCNC: 1.4 MG/DL (ref 0.2–1.3)
BUN SERPL-MCNC: 25 MG/DL (ref 7–30)
CALCIUM SERPL-MCNC: 8.5 MG/DL (ref 8.5–10.1)
CHLORIDE SERPL-SCNC: 111 MMOL/L (ref 94–109)
CO2 SERPL-SCNC: 20 MMOL/L (ref 20–32)
CREAT SERPL-MCNC: 0.54 MG/DL (ref 0.52–1.04)
DIFFERENTIAL METHOD BLD: ABNORMAL
EOSINOPHIL # BLD AUTO: 0.2 10E9/L (ref 0–0.7)
EOSINOPHIL NFR BLD AUTO: 1.1 %
ERYTHROCYTE [DISTWIDTH] IN BLOOD BY AUTOMATED COUNT: 17.9 % (ref 10–15)
GFR SERPL CREATININE-BSD FRML MDRD: >90 ML/MIN/{1.73_M2}
GLUCOSE SERPL-MCNC: 83 MG/DL (ref 70–99)
HCT VFR BLD AUTO: 29.2 % (ref 35–47)
HGB BLD-MCNC: 9.6 G/DL (ref 11.7–15.7)
IMM GRANULOCYTES # BLD: 0.1 10E9/L (ref 0–0.4)
IMM GRANULOCYTES NFR BLD: 0.6 %
INR PPP: 1.45 (ref 0.86–1.14)
LYMPHOCYTES # BLD AUTO: 1.7 10E9/L (ref 0.8–5.3)
LYMPHOCYTES NFR BLD AUTO: 11.6 %
MCH RBC QN AUTO: 30.9 PG (ref 26.5–33)
MCHC RBC AUTO-ENTMCNC: 32.9 G/DL (ref 31.5–36.5)
MCV RBC AUTO: 94 FL (ref 78–100)
MONOCYTES # BLD AUTO: 1.2 10E9/L (ref 0–1.3)
MONOCYTES NFR BLD AUTO: 8.1 %
NEUTROPHILS # BLD AUTO: 11.3 10E9/L (ref 1.6–8.3)
NEUTROPHILS NFR BLD AUTO: 78.4 %
NRBC # BLD AUTO: 0 10*3/UL
NRBC BLD AUTO-RTO: 0 /100
PLATELET # BLD AUTO: 257 10E9/L (ref 150–450)
POTASSIUM SERPL-SCNC: 4 MMOL/L (ref 3.4–5.3)
PROT SERPL-MCNC: 5.6 G/DL (ref 6.8–8.8)
RBC # BLD AUTO: 3.11 10E12/L (ref 3.8–5.2)
SODIUM SERPL-SCNC: 137 MMOL/L (ref 133–144)
SPECIMEN SOURCE: NORMAL
WBC # BLD AUTO: 14.5 10E9/L (ref 4–11)

## 2021-05-12 PROCEDURE — 85025 COMPLETE CBC W/AUTO DIFF WBC: CPT | Performed by: INTERNAL MEDICINE

## 2021-05-12 PROCEDURE — 250N000013 HC RX MED GY IP 250 OP 250 PS 637: Performed by: INTERNAL MEDICINE

## 2021-05-12 PROCEDURE — 90632 HEPA VACCINE ADULT IM: CPT | Performed by: INTERNAL MEDICINE

## 2021-05-12 PROCEDURE — 90471 IMMUNIZATION ADMIN: CPT | Performed by: INTERNAL MEDICINE

## 2021-05-12 PROCEDURE — 36415 COLL VENOUS BLD VENIPUNCTURE: CPT | Performed by: SURGERY

## 2021-05-12 PROCEDURE — 250N000011 HC RX IP 250 OP 636: Performed by: INTERNAL MEDICINE

## 2021-05-12 PROCEDURE — 99239 HOSP IP/OBS DSCHRG MGMT >30: CPT | Performed by: INTERNAL MEDICINE

## 2021-05-12 PROCEDURE — 80053 COMPREHEN METABOLIC PANEL: CPT | Performed by: INTERNAL MEDICINE

## 2021-05-12 PROCEDURE — 99232 SBSQ HOSP IP/OBS MODERATE 35: CPT | Performed by: NURSE PRACTITIONER

## 2021-05-12 PROCEDURE — 85610 PROTHROMBIN TIME: CPT | Performed by: INTERNAL MEDICINE

## 2021-05-12 PROCEDURE — 86301 IMMUNOASSAY TUMOR CA 19-9: CPT | Performed by: SURGERY

## 2021-05-12 PROCEDURE — 36415 COLL VENOUS BLD VENIPUNCTURE: CPT | Performed by: INTERNAL MEDICINE

## 2021-05-12 RX ADMIN — Medication 10 ML: at 10:38

## 2021-05-12 RX ADMIN — HEPATITIS A VACCINE 1440 UNITS: 1440 INJECTION, SUSPENSION INTRAMUSCULAR at 10:59

## 2021-05-12 RX ADMIN — URSODIOL 300 MG: 300 CAPSULE ORAL at 08:23

## 2021-05-12 RX ADMIN — MESALAMINE 2400 MG: 1.2 TABLET, DELAYED RELEASE ORAL at 08:22

## 2021-05-12 RX ADMIN — FUROSEMIDE 20 MG: 20 TABLET ORAL at 08:23

## 2021-05-12 RX ADMIN — DOCUSATE SODIUM 50 MG AND SENNOSIDES 8.6 MG 2 TABLET: 8.6; 5 TABLET, FILM COATED ORAL at 08:29

## 2021-05-12 RX ADMIN — CEFTRIAXONE 2 G: 2 INJECTION, POWDER, FOR SOLUTION INTRAMUSCULAR; INTRAVENOUS at 09:54

## 2021-05-12 RX ADMIN — CARVEDILOL 6.25 MG: 6.25 TABLET, FILM COATED ORAL at 08:23

## 2021-05-12 RX ADMIN — POLYETHYLENE GLYCOL 3350 17 G: 17 POWDER, FOR SOLUTION ORAL at 08:29

## 2021-05-12 RX ADMIN — SPIRONOLACTONE 50 MG: 50 TABLET, FILM COATED ORAL at 08:23

## 2021-05-12 RX ADMIN — METRONIDAZOLE 500 MG: 500 TABLET ORAL at 08:23

## 2021-05-12 RX ADMIN — Medication 1 TABLET: at 08:23

## 2021-05-12 ASSESSMENT — ACTIVITIES OF DAILY LIVING (ADL)
ADLS_ACUITY_SCORE: 13

## 2021-05-12 NOTE — PROGRESS NOTES
Care Management Discharge Note    Discharge Date: 05/12/21       Discharge Disposition: Home, Home Infusion    Discharge Services: None    Discharge DME: None    Discharge Transportation: family or friend will provide      Patient/family educated on Medicare website which has current facility and service quality ratings: no    Education Provided on the Discharge Plan:  Yes  Persons Notified of Discharge Plans: Patient, Gemma WRIGHT, provider team  Patient/Family in Agreement with the Plan: yes    Handoff Referral Completed: Yes    Additional Information:  Notified by Dr. Kohler that pt will discharge home today on q day IV ceftriaxone.  Pt will receive her IV antibiotic dose prior to discharge.       Met with pt.  Per discussion with pt she is hoping to have a paracentesis.  She has discussed this with the provider and if they can do it before Noon pt will have it done while inpatient if not pt will plan to f/u outpatient as she has an appointment confirmed for Friday 5/14.      Reviewed TRENT Boss.  Discharge home infusion orders reviewed.     Brissa Ruelas RN BSN, PHN, ACM-RN  7A RN Care Coordinator  Phone: [phone]  Pager [phone]    5/12/2021 12:14 PM

## 2021-05-12 NOTE — PROGRESS NOTES
"[facility]  HEPATOLOGY PROGRESS NOTE  Berta Nava [id number]       CC: fevers, lethargy  SUBJECTIVE:  Continues to feel at baseline and has been able to walk in hallway. Denies fevers, abdominal pain, pruritus, melena or hematochezia.     ROS:  A 10-point review of systems was negative.    OBJECTIVE:  VS: /71 (BP Location: Left arm)   Pulse 80   Temp 98.4  F (36.9  C) (Oral)   Resp 16   Ht 1.651 m (5' 5\")   Wt 53.7 kg (118 lb 6.4 oz)   SpO2 97%   BMI 19.70 kg/m       Gross per 24 hour   Intake 360 ml   Output --   Net 360 ml     General: In no acute distress, mild facial muscle wasting  Neuro: AOx3, No asterixis  Lymph: No cervical lymphadenopathy  HEENT:  Noscleral icterus, Nooral lesions  CV: S1/H0ewpoxfh murmurs. Skin warm and dry  Lungs: clear to auscultation, diminished bases Respirations even and nonlabored on room air  Abd:  Moderately distended, nontender    Extrem: Noperipheral edema   Skin: mild jaundice  Psych:affect and mood normal    MEDICATIONS:  Current Facility-Administered Medications   Medication     bisacodyl (DULCOLAX) Suppository 10 mg     carvedilol (COREG) tablet 6.25 mg     cefTRIAXone (ROCEPHIN) 2 g vial to attach to  ml bag for ADULTS or NS 50 ml bag for PEDS     furosemide (LASIX) tablet 20 mg     heparin lock flush 10 UNIT/ML injection 5-10 mL     heparin lock flush 10 UNIT/ML injection 5-10 mL     lidocaine (LMX4) cream     lidocaine 1 % 0.1-1 mL     May continue current IV fluid if patient has IV fluids infusing until discharge.     May continue current IV fluids if patient has IV fluids infusing.     melatonin tablet 3 mg     mesalamine (LIALDA) EC tablet 2,400 mg     metroNIDAZOLE (FLAGYL) tablet 500 mg     multivitamin w/minerals (THERA-VIT-M) tablet 1 tablet     naloxone (NARCAN) injection 0.2 mg     naloxone (NARCAN) injection 0.2 mg     naloxone (NARCAN) injection 0.4 mg     naloxone (NARCAN) injection 0.4 mg     ondansetron (ZOFRAN-ODT) ODT tab 4 mg    " Or     ondansetron (ZOFRAN) injection 4 mg     polyethylene glycol (MIRALAX) Packet 17 g     potassium chloride ER (MICRO-K) CR capsule 10 mEq     senna-docusate (SENOKOT-S/PERICOLACE) 8.6-50 MG per tablet 2 tablet     simvastatin (ZOCOR) tablet 20 mg     sodium chloride (PF) 0.9% PF flush 10-20 mL     sodium chloride (PF) 0.9% PF flush 3 mL     sodium chloride (PF) 0.9% PF flush 3 mL     sodium chloride (PF) 0.9% PF flush 3 mL     sodium chloride (PF) 0.9% PF flush 3 mL     spironolactone (ALDACTONE) tablet 50 mg     ursodiol (ACTIGALL) capsule 300 mg     Current Outpatient Medications   Medication     calcium carbonate 600 mg-vitamin D 400 units (CALTRATE) 600-400 MG-UNIT per tablet     carvedilol (COREG) 6.25 MG tablet     cefTRIAXone (ROCEPHIN) 2 GM vial     furosemide (LASIX) 20 MG tablet     mesalamine (LIALDA) 1.2 g EC tablet     metroNIDAZOLE (FLAGYL) 500 MG tablet     multivitamin (CENTRUM SILVER) tablet     potassium chloride ER (MICRO-K) 10 MEQ CR capsule     simvastatin (ZOCOR) 20 MG tablet     spironolactone (ALDACTONE) 50 MG tablet     ursodiol (ACTIGALL) 300 MG capsule       REVIEW OF LABORATORY, PATHOLOGY AND IMAGING RESULTS:  BMP  Recent Labs   Lab 05/12/21  0542 05/11/21  0724 05/10/21  0629 05/09/21  0649    134 139 138   POTASSIUM 4.0 4.1 4.0 3.8   CHLORIDE 111* 108 112* 112*   MARIELENA 8.5 8.6 9.0 8.7   CO2 20 18* 18* 20   BUN 25 30 21 17   CR 0.54 0.65 0.51* 0.45*   GLC 83 116* 85 87     CBC  Recent Labs   Lab 05/12/21  0542 05/11/21  0724 05/10/21  0629 05/09/21  0649   WBC 14.5* 18.3* 13.3* 21.9*   RBC 3.11* 3.31* 3.70* 3.31*   HGB 9.6* 9.7* 11.5* 10.1*   HCT 29.2* 30.4* 34.6* 31.0*   MCV 94 92 94 94   MCH 30.9 29.3 31.1 30.5   MCHC 32.9 31.9 33.2 32.6   RDW 17.9* 17.5* 17.5* 16.9*    264 293 263     INR  Recent Labs   Lab 05/12/21  0542 05/11/21  0724 05/10/21  0629 05/09/21  0649   INR 1.45* 1.45* 1.38* 1.41*     LFTs  Recent Labs   Lab 05/12/21  0542 05/11/21  0724 05/10/21  0629  05/09/21  0649   ALKPHOS 398* 479* 529* 538*   AST 57* 55* 69* 62*   ALT 53* 57* 67* 69*   BILITOTAL 1.4* 1.6* 1.8* 1.3   PROTTOTAL 5.6* 5.8* 6.2* 5.8*   ALBUMIN 2.0* 2.0* 2.0* 1.9*      PANCNo lab results found in last 7 days.   MELD-Na score: 12 at 5/12/2021  5:42 AM  MELD score: 12 at 5/12/2021  5:42 AM  Calculated from:  Serum Creatinine: 0.54 mg/dL (Rounded to 1 mg/dL) at 5/12/2021  5:42 AM  Serum Sodium: 137 mmol/L at 5/12/2021  5:42 AM  Total Bilirubin: 1.4 mg/dL at 5/12/2021  5:42 AM  INR(ratio): 1.45 at 5/12/2021  5:42 AM  Age: 55 years 8 months      Imaging Results:  ERCP 5/10/21  Impression:          - One stent from the biliary tree was seen in the major                        papilla. Removed. This stent was previously thought to                        be in the left intrahepatic but on CT it was actually in                        the right posterior duct. Patient has a shrunken/rotated                        liver making fluoroscopic ductal anatomy interpretation                        more challenging.                        - Cholangiogram again showed diffusely irregular biliary                        tree with paucity/loss of multiple tertiary branches                        consistent with PSC/cirrhosis. A peripheral branch off                        of right anterior? contiained a saccular dilation with                        multiple filling defects/stones that could not be                        accessed. The ~2 cm collection/abscess off of the right                        posterior duct was again visualized and could not be                        accessed. More cephlad collection seen on CT did not                        fill.                        - Cystic duct/gallbladder opacified                        - Residual stones in the right posterior duct and left                        intrahepatic duct cleared with balloon dilation and                        balloon/basket sweeping. Large  amount of stone,                        sludge/cast-like material with associated pus removed                        - One 10 Fr x 22 cm Johlin stent placed into the right                        posterior intrahepatic biliary tree                        - One 8.5 Fr x 20 cm Johlin stent placed into left                        intrahepatic duct                        - Grade III esophageal varices. 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 She was started on Zosyn and transitioned to ceftriaxone. Initial ERCP on 5/3 did have large stones and partially occluded stents which was exchanged. Repeat ERCP 5/10 with exchange of biliary stent with attempts at access of abscess area.     RECOMMENDATIONS:  1. PSC  2. Biliary strictures/stones  3. Hepatic abscess  - will need mammogram as outpatient to complete tx eval- she has scheduled.  MELD 12, ABO AB  - ERCP 5/10 with paritally occluded stent, duct swept with casts and stones.  Hepatic abscess likely accessed with internal drainage. Will plan to continue with abx per ID and repeat ERCP in 1 month.   - Improvement in liver tests and WBC.     - has been ursodiol 300 mg BID  - US 5/4 without evidence of HCC  - followed by ID and will need IV abx at home.   - discussed at liver transplant conference 5/11, plan to re discuss when imaging suggests improvement in abscess     4. SBP  5. Ascites  - initial para on 4/29  with 1094 PMN's, culture negative. Received 75 gm Albumin on 4/29, 50 gms on 5/3.   - now on ceftriaxone Will need SBP ppx with ciprofloxacin 500 mg daily when abx completed.   - para as needed- has outpatient scheduled Frday  - has been on low dose diuretics with lasix 20 mg daily and spironolactone 50 mg daily. Continuing to hold increasing due to current infection/abscess.      6. Esophageal varices  - EGD 12/1/20 with gr II-III EV. Repeat ERCP's continue to see gr III EV and not banded due to potential for repeat ERCP's and potential for cholangitis. On carvedilol 6.25 BID. If she continues to have LVP for ascites, this may need to be stopped.   - no current signs of bleeding.     7. Strep mitis bacteremia with sepsis- resolved  - strep mitis + on 4/29, follow up negative 5/1, 5/3. pansensitive  - has been on zosyn since 4/29-5/3. Ceftriaxone started 5/4  - echocardiogram negative for vegetations  -  Dental CT with caries, advised she needs to visit with dentist following discharge. No abscess.      8. Immunizations  - please start HBV/HAV vaccination prior to discharge.     Patient was discussed with attending physician, Dr. Emma Mao    Next follow up appointment: Dr. Leventhal 6/21    Thank you for the opportunity to be involved with  Berta Nava [facility]. Please call with any questions or concerns.    Andria Rowe, APRN, CNP  Inpatient Hepatology [place]  Text Link

## 2021-05-12 NOTE — PLAN OF CARE
"/72 (BP Location: Left arm)   Pulse 68   Temp 97.8  F (36.6  C) (Oral)   Resp 16   Ht 1.651 m (5' 5\")   Wt 53.7 kg (118 lb 6.4 oz)   SpO2 99%   BMI 19.70 kg/m      Shift: 5388-3748  VS: WDL on RA, afebrile  Neuro: Aox4  Behaviors: Pleasant, cooperative, able to make her needs known  BG: NA  Respiratory: clear lung sounds, no shortness of breath  Cardiac: regular rhythm/rate  Pain/Nausea/PRN: No pain nor nausea  Diet: 2g Na, good appetite  LDA: right 2 lumen PICC  Infusion(s): ceftriaxone q24h,   GI/: no BM this shift.  Voiding, not saving  Skin: intact  Mobility: Independent  Plan: paracentesis 05/12.  Continue plan of care.    "

## 2021-05-12 NOTE — PLAN OF CARE
DISCHARGE:  Patient with orders to discharge to her home     Education Provided:     Handouts: Discharge instruction    LDAs: PICC heparin locked/intact    Discharge instructions, medications & follow ups reviewed with pt. Copy of discharge summary given to pt. Hepatitis A vaccin given in R deltoid    Patient in stable condition. AVSS. Pt had no further questions regarding discharge instructions and medications. Patient walked out the unit herself to meed with her ride

## 2021-05-13 ENCOUNTER — HOME INFUSION (PRE-WILLOW HOME INFUSION) (OUTPATIENT)
Dept: PHARMACY | Facility: CLINIC | Age: 56
End: 2021-05-13

## 2021-05-13 NOTE — PROGRESS NOTES
[facility]: Post-Discharge Note  SITUATION                                                      Admission:    Admission Date: 05/03/21   Reason for Admission: Septic shock 2/2 strep mitis bacteremia, resolvedSBP  Discharge:   Discharge Date: 05/12/21  Discharge Diagnosis: Septic shock 2/2 strep mitis bacteremia, resolvedSBP    BACKGROUND                                                      Berta Nava is a 55 year old with hx of PSC with cirrhosis c/b ascites and esophageal varices, UC on mesalamine, biliary strictures s/p stenting (1/6/21), pancreatic mucinous cystadenoma, and currently undergoing liver transplant evaluation who presented to [facility] with septic shock due to S. Mitis bacteremia and SBP, now s/p ERCP on 5/5 and 5/10, each with biliary stent exchange and persistence of stones and pus in biliary tree as well as ~2 cm fluid collection suggestive of abscess. ID recommending IV antibiotic therapy on discharge duration pending results of planned ERCP in ~4 weeks.    ASSESSMENT      Discharge Assessment  Patient reports symptoms are: Improved  Does the patient have all of their medications?: Yes  Does patient know what their new medications are for?: Yes  Does patient have a follow-up appointment scheduled?: Yes  Does patient have any other questions or concerns?: No    Post-op  Did the patient have surgery or a procedure: Yes  Incision: healing  Drainage: No  Bleeding: none  Fever: No  Chills: No  Redness: No  Warmth: No  Swelling: No  Incision site pain: No  Eating & Drinking: eating and drinking without complaints/concerns  PO Intake: regular diet  Bowel Function: constipation  Urinary Status: voiding without complaint/concerns        PLAN                                                      Outpatient Plan:  Follow-up Appointments     Follow Up and recommended labs and tests      Please keep your follow up appointments as scheduled     Infectious disease will contact you to make an  appointment, if you haven't   heard from them in a week please call Call [phone]          The GI team will call you to schedule an appointment for repeat ERCP in ~1 month.       Future Appointments   Date Time Provider Department Center   5/14/2021  8:50 AM RHBA1 [facility]   5/14/2021 10:30 AM RHUS1 [facility]   5/21/2021  1:00 PM [facility]1 [facility]   5/26/2021 12:00 PM Gloria Morocho MD [facility]   6/21/2021  8:00 AM Leventhal, Thomas Michael, MD [facility]           Diane Ibarra [facility]

## 2021-05-13 NOTE — PROGRESS NOTES
This is a recent snapshot of the patient's [facility] medical record.  For current drug dose and complete information and questions, call [phone] or In Basket pool, [facility]v home infusion ([id number])  CSN Number:  [id number]

## 2021-05-14 ENCOUNTER — HOSPITAL ENCOUNTER (OUTPATIENT)
Dept: MAMMOGRAPHY | Facility: CLINIC | Age: 56
End: 2021-05-14
Payer: COMMERCIAL

## 2021-05-14 ENCOUNTER — PATIENT OUTREACH (OUTPATIENT)
Dept: GASTROENTEROLOGY | Facility: CLINIC | Age: 56
End: 2021-05-14

## 2021-05-14 ENCOUNTER — HOSPITAL ENCOUNTER (OUTPATIENT)
Dept: ULTRASOUND IMAGING | Facility: CLINIC | Age: 56
End: 2021-05-14
Attending: INTERNAL MEDICINE
Payer: COMMERCIAL

## 2021-05-14 VITALS
HEART RATE: 79 BPM | SYSTOLIC BLOOD PRESSURE: 118 MMHG | RESPIRATION RATE: 16 BRPM | OXYGEN SATURATION: 99 % | DIASTOLIC BLOOD PRESSURE: 76 MMHG

## 2021-05-14 DIAGNOSIS — R18.8 CIRRHOSIS OF LIVER WITH ASCITES, UNSPECIFIED HEPATIC CIRRHOSIS TYPE (H): ICD-10-CM

## 2021-05-14 DIAGNOSIS — K74.60 CIRRHOSIS OF LIVER WITH ASCITES, UNSPECIFIED HEPATIC CIRRHOSIS TYPE (H): ICD-10-CM

## 2021-05-14 DIAGNOSIS — Z12.31 VISIT FOR SCREENING MAMMOGRAM: ICD-10-CM

## 2021-05-14 DIAGNOSIS — Z11.59 ENCOUNTER FOR SCREENING FOR OTHER VIRAL DISEASES: Primary | ICD-10-CM

## 2021-05-14 LAB
BACTERIA SPEC CULT: NORMAL
CANCER AG19-9 SERPL-ACNC: 94 U/ML (ref 0–37)
SPECIMEN SOURCE: NORMAL

## 2021-05-14 PROCEDURE — 77067 SCR MAMMO BI INCL CAD: CPT

## 2021-05-14 PROCEDURE — 49083 ABD PARACENTESIS W/IMAGING: CPT

## 2021-05-14 PROCEDURE — 250N000011 HC RX IP 250 OP 636

## 2021-05-14 PROCEDURE — 250N000009 HC RX 250: Performed by: RADIOLOGY

## 2021-05-14 PROCEDURE — P9047 ALBUMIN (HUMAN), 25%, 50ML: HCPCS

## 2021-05-14 RX ORDER — ALBUMIN (HUMAN) 12.5 G/50ML
25 SOLUTION INTRAVENOUS ONCE
Status: COMPLETED | OUTPATIENT
Start: 2021-05-14 | End: 2021-05-14

## 2021-05-14 RX ORDER — ALBUMIN (HUMAN) 12.5 G/50ML
SOLUTION INTRAVENOUS
Status: COMPLETED
Start: 2021-05-14 | End: 2021-05-14

## 2021-05-14 RX ADMIN — ALBUMIN HUMAN 25 G: 0.25 SOLUTION INTRAVENOUS at 10:52

## 2021-05-14 RX ADMIN — ALBUMIN (HUMAN) 25 G: 12.5 SOLUTION INTRAVENOUS at 10:52

## 2021-05-14 RX ADMIN — LIDOCAINE HYDROCHLORIDE 10 ML: 10 INJECTION, SOLUTION EPIDURAL; INFILTRATION; INTRACAUDAL; PERINEURAL at 10:46

## 2021-05-14 NOTE — IR NOTE
ULTRASOUND GUIDED PARACENTESIS   5/14/2021 11:45 AM     HISTORY:  Cirrhosis of liver with ascites, unspecified hepatic cirrhosis type (H); Cirrhosis of liver with ascites, unspecified hepatic cirrhosis type (H)    PROCEDURE:   Informed consent was obtained from the patient prior to the procedure with discussion including the possible risks of bleeding, infection and organ injury . Using 5 mL of 1% lidocaine for local anesthesia, sterile technique, and sonographic guidance with permanent image documentation, I placed an 8F paracentesis catheter into the peritoneal fluid collection. This was used to aspirate 5000 mL of yellow, serous fluid in vacuum bottles, and some of this was sent for any laboratory studies that had been ordered. There were no immediate complications.  Intravenous albumen replacement was performed according to protocol.    IMPRESSION:  Ultrasound guided paracentesis.

## 2021-05-14 NOTE — PROGRESS NOTES
Paracentesis complete.  Consent obtained with Dr Cervantes.  Pt tolerated well, drained 5600 mLs clear fabiola colored fluid.  Site at RLQ.  Site covered with a sterile band aid.  Site CDI.  Albumin 25 grams.  VSS.  No complications noted.  Reviewed discharge instructions with pt.  Pt stable on discharge.

## 2021-05-14 NOTE — PROGRESS NOTES
Called to discuss with patient.   Procedure/Imaging/Clinic: ERCP   Physician: Harvey   Timin month   Procedure length:   Anesthesia: General   Dx: stent exchange   Tier: 3   Location: [facility] Hold date     Explained they can expect a call from  for date and time of procedure, will need a , someone to stay with them for 24 hours and should stay in town for 24 hours (within 45 min of Hospital) post procedure    Patient needs to get pre-op physical completed. If outside  [facility] system will need physical faxed to number [phone]   If you do not get a preop physical, your procedure could be cancelled, patient voiced understanding*    Preop Plan: Pt is planning on arranging a PCP through [facility]     Med Review    Blood thinner -  none  ASA - none  Diabetic - none    COVID test discussed: yes, pt will schedule no more then 4 days in advance of procedure, COVID order placed    Patient Education r/t procedure: mychart, would like to have education sent this way    Does patient have any history of gastric bypass/gastric surgery/altered panc/bili anatomy? none    A pre-op nurse will call 1-2 days prior to the procedure. Is advised to be NPO/no solid food 8 hours before the procedure. Ok to drink clear liquids (Water, Apple Juice or Gatorade) up to 2 hours prior to procedure.     Verbalized understanding of all instructions. All questions answered.      Order placed, message sent to OR      Jeanette Brennan RN, BSN,   [facility]  Care coordinator   [phone]  Fax [phone]

## 2021-05-17 ENCOUNTER — TELEPHONE (OUTPATIENT)
Dept: TRANSPLANT | Facility: CLINIC | Age: 56
End: 2021-05-17

## 2021-05-17 PROBLEM — Z46.89 ENCOUNTER FOR REPLACEMENT OF BILIARY STENT: Status: ACTIVE | Noted: 2021-05-17

## 2021-05-17 NOTE — TELEPHONE ENCOUNTER
Transplant Social Work Services Phone Call      Data: Berta is being evaluated for a liver transplant.  She was recently hospitalized, and discharged to home on 5/12/21.  Intervention: I called Berta for psychosocial follow up and support.  Assessment: Berta reports getting stronger each day.  She plans to return to work the end of this month, working from home.  Berta completed a health care directive while she was hospitalized.  It was notarized and scanned into Epic.  Education provided by : virtual liver transplant support group  Plan: I will remain available for the psychosocial needs of this patient.      JOSETTE Curiel, [facility]  Liver Transplant   Phone [phone]  Pager [phone]

## 2021-05-18 ENCOUNTER — HOME INFUSION (PRE-WILLOW HOME INFUSION) (OUTPATIENT)
Dept: PHARMACY | Facility: CLINIC | Age: 56
End: 2021-05-18

## 2021-05-18 ENCOUNTER — DOCUMENTATION ONLY (OUTPATIENT)
Dept: INFECTIOUS DISEASES | Facility: CLINIC | Age: 56
End: 2021-05-18

## 2021-05-18 ENCOUNTER — MEDICAL CORRESPONDENCE (OUTPATIENT)
Dept: HEALTH INFORMATION MANAGEMENT | Facility: CLINIC | Age: 56
End: 2021-05-18

## 2021-05-18 DIAGNOSIS — Z11.59 ENCOUNTER FOR SCREENING FOR OTHER VIRAL DISEASES: ICD-10-CM

## 2021-05-18 LAB
ALBUMIN SERPL-MCNC: 2.5 G/DL (ref 3.4–5)
ALP SERPL-CCNC: 370 U/L (ref 40–150)
ALT SERPL W P-5'-P-CCNC: 37 U/L (ref 0–50)
ANION GAP SERPL CALCULATED.3IONS-SCNC: 7 MMOL/L (ref 3–14)
AST SERPL W P-5'-P-CCNC: 49 U/L (ref 0–45)
BILIRUB SERPL-MCNC: 2.2 MG/DL (ref 0.2–1.3)
BUN SERPL-MCNC: 15 MG/DL (ref 7–30)
CALCIUM SERPL-MCNC: 8.4 MG/DL (ref 8.5–10.1)
CHLORIDE SERPL-SCNC: 106 MMOL/L (ref 94–109)
CO2 SERPL-SCNC: 21 MMOL/L (ref 20–32)
CREAT SERPL-MCNC: 0.61 MG/DL (ref 0.52–1.04)
CRP SERPL-MCNC: 21.4 MG/L (ref 0–8)
ERYTHROCYTE [DISTWIDTH] IN BLOOD BY AUTOMATED COUNT: 18.1 % (ref 10–15)
GFR SERPL CREATININE-BSD FRML MDRD: >90 ML/MIN/{1.73_M2}
GLUCOSE SERPL-MCNC: 130 MG/DL (ref 70–99)
HCT VFR BLD AUTO: 33.7 % (ref 35–47)
HGB BLD-MCNC: 10.9 G/DL (ref 11.7–15.7)
MCH RBC QN AUTO: 30.6 PG (ref 26.5–33)
MCHC RBC AUTO-ENTMCNC: 32.3 G/DL (ref 31.5–36.5)
MCV RBC AUTO: 95 FL (ref 78–100)
PLATELET # BLD AUTO: 274 10E9/L (ref 150–450)
POTASSIUM SERPL-SCNC: 3.8 MMOL/L (ref 3.4–5.3)
PROT SERPL-MCNC: 6.7 G/DL (ref 6.8–8.8)
RBC # BLD AUTO: 3.56 10E12/L (ref 3.8–5.2)
SODIUM SERPL-SCNC: 134 MMOL/L (ref 133–144)
WBC # BLD AUTO: 9.3 10E9/L (ref 4–11)

## 2021-05-18 PROCEDURE — 80053 COMPREHEN METABOLIC PANEL: CPT | Performed by: INTERNAL MEDICINE

## 2021-05-18 PROCEDURE — 85027 COMPLETE CBC AUTOMATED: CPT | Performed by: INTERNAL MEDICINE

## 2021-05-18 PROCEDURE — 86140 C-REACTIVE PROTEIN: CPT | Performed by: INTERNAL MEDICINE

## 2021-05-18 NOTE — PROGRESS NOTES
Labs (5/18/21):      - WBC: 9.3 (significantly improved and now normalized)  - H/H: 10.9/33.7 (improved from 5/12/21)  - Plat: 274  - Cr: 0.61; BUN: 15  - Alk phos: 370; ALT: 37; AST: 49; T bili: 2.2 (LFTs are stable and trending down - patient has cirrhosis secondary to PSC)  - CRP: 21.4 (patient did not have a CRP during the recent hospitalization so this value will be compared with the next CRP of this coming week)      Patient has a follow up with me on 5/26/21 and has a follow up with GI tem on 6/21/21.

## 2021-05-19 ENCOUNTER — HOME INFUSION (PRE-WILLOW HOME INFUSION) (OUTPATIENT)
Dept: PHARMACY | Facility: CLINIC | Age: 56
End: 2021-05-19

## 2021-05-21 ENCOUNTER — HOSPITAL ENCOUNTER (OUTPATIENT)
Dept: LAB | Facility: CLINIC | Age: 56
End: 2021-05-21
Attending: INTERNAL MEDICINE
Payer: COMMERCIAL

## 2021-05-21 ENCOUNTER — HOSPITAL ENCOUNTER (OUTPATIENT)
Dept: ULTRASOUND IMAGING | Facility: CLINIC | Age: 56
End: 2021-05-21
Attending: INTERNAL MEDICINE
Payer: COMMERCIAL

## 2021-05-21 VITALS
RESPIRATION RATE: 16 BRPM | OXYGEN SATURATION: 99 % | DIASTOLIC BLOOD PRESSURE: 76 MMHG | HEART RATE: 78 BPM | SYSTOLIC BLOOD PRESSURE: 114 MMHG

## 2021-05-21 DIAGNOSIS — K75.0 LIVER ABSCESS: ICD-10-CM

## 2021-05-21 DIAGNOSIS — K74.60 CIRRHOSIS OF LIVER (H): ICD-10-CM

## 2021-05-21 LAB
ALBUMIN SERPL-MCNC: 2.9 G/DL (ref 3.4–5)
ALBUMIN UR-MCNC: 20 MG/DL
ALP SERPL-CCNC: 321 U/L (ref 40–150)
ALT SERPL W P-5'-P-CCNC: 30 U/L (ref 0–50)
ANION GAP SERPL CALCULATED.3IONS-SCNC: 7 MMOL/L (ref 3–14)
APPEARANCE UR: CLEAR
AST SERPL W P-5'-P-CCNC: 38 U/L (ref 0–45)
BILIRUB SERPL-MCNC: 2 MG/DL (ref 0.2–1.3)
BILIRUB UR QL STRIP: NEGATIVE
BUN SERPL-MCNC: 14 MG/DL (ref 7–30)
CALCIUM SERPL-MCNC: 8.6 MG/DL (ref 8.5–10.1)
CHLORIDE SERPL-SCNC: 106 MMOL/L (ref 94–109)
CO2 SERPL-SCNC: 22 MMOL/L (ref 20–32)
COLOR UR AUTO: ABNORMAL
CREAT SERPL-MCNC: 0.61 MG/DL (ref 0.52–1.04)
CREAT UR-MCNC: 157 MG/DL
CRP SERPL-MCNC: 16.1 MG/L (ref 0–8)
ERYTHROCYTE [DISTWIDTH] IN BLOOD BY AUTOMATED COUNT: 17.7 % (ref 10–15)
GFR SERPL CREATININE-BSD FRML MDRD: >90 ML/MIN/{1.73_M2}
GLUCOSE SERPL-MCNC: 91 MG/DL (ref 70–99)
GLUCOSE UR STRIP-MCNC: NEGATIVE MG/DL
HCT VFR BLD AUTO: 33.1 % (ref 35–47)
HGB BLD-MCNC: 10.6 G/DL (ref 11.7–15.7)
HGB UR QL STRIP: NEGATIVE
KETONES UR STRIP-MCNC: NEGATIVE MG/DL
LEUKOCYTE ESTERASE UR QL STRIP: NEGATIVE
MCH RBC QN AUTO: 30.6 PG (ref 26.5–33)
MCHC RBC AUTO-ENTMCNC: 32 G/DL (ref 31.5–36.5)
MCV RBC AUTO: 96 FL (ref 78–100)
MUCOUS THREADS #/AREA URNS LPF: PRESENT /LPF
NITRATE UR QL: NEGATIVE
PH UR STRIP: 6 PH (ref 5–7)
PLATELET # BLD AUTO: 242 10E9/L (ref 150–450)
POTASSIUM SERPL-SCNC: 3.6 MMOL/L (ref 3.4–5.3)
PROT SERPL-MCNC: 6.9 G/DL (ref 6.8–8.8)
PROT UR-MCNC: 0.42 G/L
PROT/CREAT 24H UR: 0.26 G/G CR (ref 0–0.2)
RBC # BLD AUTO: 3.46 10E12/L (ref 3.8–5.2)
RBC #/AREA URNS AUTO: 1 /HPF (ref 0–2)
SODIUM SERPL-SCNC: 135 MMOL/L (ref 133–144)
SOURCE: ABNORMAL
SP GR UR STRIP: 1.03 (ref 1–1.03)
UROBILINOGEN UR STRIP-MCNC: NORMAL MG/DL (ref 0–2)
WBC # BLD AUTO: 8.1 10E9/L (ref 4–11)
WBC #/AREA URNS AUTO: 4 /HPF (ref 0–5)

## 2021-05-21 PROCEDURE — 49083 ABD PARACENTESIS W/IMAGING: CPT

## 2021-05-21 PROCEDURE — 86481 TB AG RESPONSE T-CELL SUSP: CPT | Performed by: INTERNAL MEDICINE

## 2021-05-21 PROCEDURE — 250N000009 HC RX 250: Performed by: RADIOLOGY

## 2021-05-21 PROCEDURE — 250N000011 HC RX IP 250 OP 636

## 2021-05-21 PROCEDURE — 84156 ASSAY OF PROTEIN URINE: CPT | Performed by: INTERNAL MEDICINE

## 2021-05-21 PROCEDURE — 80053 COMPREHEN METABOLIC PANEL: CPT | Performed by: INTERNAL MEDICINE

## 2021-05-21 PROCEDURE — 86140 C-REACTIVE PROTEIN: CPT | Performed by: INTERNAL MEDICINE

## 2021-05-21 PROCEDURE — 36415 COLL VENOUS BLD VENIPUNCTURE: CPT | Performed by: INTERNAL MEDICINE

## 2021-05-21 PROCEDURE — 81001 URINALYSIS AUTO W/SCOPE: CPT | Performed by: INTERNAL MEDICINE

## 2021-05-21 PROCEDURE — P9047 ALBUMIN (HUMAN), 25%, 50ML: HCPCS

## 2021-05-21 PROCEDURE — 85027 COMPLETE CBC AUTOMATED: CPT | Performed by: INTERNAL MEDICINE

## 2021-05-21 RX ORDER — ALBUMIN (HUMAN) 12.5 G/50ML
25 SOLUTION INTRAVENOUS ONCE
Status: COMPLETED | OUTPATIENT
Start: 2021-05-21 | End: 2021-05-21

## 2021-05-21 RX ORDER — ALBUMIN (HUMAN) 12.5 G/50ML
SOLUTION INTRAVENOUS
Status: COMPLETED
Start: 2021-05-21 | End: 2021-05-21

## 2021-05-21 RX ADMIN — ALBUMIN (HUMAN) 25 G: 12.5 SOLUTION INTRAVENOUS at 14:32

## 2021-05-21 RX ADMIN — ALBUMIN HUMAN 25 G: 0.25 SOLUTION INTRAVENOUS at 14:32

## 2021-05-21 RX ADMIN — LIDOCAINE HYDROCHLORIDE 10 ML: 10 INJECTION, SOLUTION EPIDURAL; INFILTRATION; INTRACAUDAL; PERINEURAL at 13:51

## 2021-05-21 NOTE — PROGRESS NOTES
This is a recent snapshot of the patient's [facility] medical record.  For current drug dose and complete information and questions, call [phone] or In Basket pool, fv home infusion ([id number])  CSN Number:  [id number]

## 2021-05-21 NOTE — PROGRESS NOTES
Patient tolerated paracentesis well.  6200 mL of straw colored fluid drained done by Dr Hess  Dressing Clean dry and intact with no drainage.  25 albumin given. Patient states understanding discharge instructions, taking P.O and home per self.  Glory Velazquez RN, BSN

## 2021-05-21 NOTE — PROCEDURES
[facility]    Procedure: Paracentesis    Date/Time: 5/21/2021 5:00 PM  Performed by: Venus Hess MD  Authorized by: Venus Hess MD     UNIVERSAL PROTOCOL   Site Marked: NA  Prior Images Obtained and Reviewed:  Yes  Required items: Required blood products, implants, devices and special equipment available    Patient identity confirmed:  Verbally with patient, arm band, provided demographic data and hospital-assigned identification number  Patient was reevaluated immediately before administering moderate or deep sedation or anesthesia  Confirmation Checklist:  Patient's identity using two indicators, relevant allergies, procedure was appropriate and matched the consent or emergent situation and correct equipment/implants were available  Time out: Immediately prior to the procedure a time out was called    Universal Protocol: the Joint Commission Universal Protocol was followed    Preparation: Patient was prepped and draped in usual sterile fashion           ANESTHESIA    Anesthesia: Local infiltration  Local Anesthetic:  Lidocaine 1% without epinephrine    See dictated procedure note for full details.  Findings: Para completed.  Full report to follow.    Specimens: none    Complications: None    Condition: Stable    PROCEDURE   Patient Tolerance:  Patient tolerated the procedure well with no immediate complications    Length of time physician/provider present for 1:1 monitoring during sedation: 0

## 2021-05-23 LAB
GAMMA INTERFERON BACKGROUND BLD IA-ACNC: 0.03 IU/ML
M TB IFN-G CD4+ BCKGRND COR BLD-ACNC: 0.21 IU/ML
M TB TUBERC IFN-G BLD QL: ABNORMAL
MITOGEN IGNF BCKGRD COR BLD-ACNC: 0 IU/ML
MITOGEN IGNF BCKGRD COR BLD-ACNC: 0 IU/ML

## 2021-05-24 NOTE — PROGRESS NOTES
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 Patient was transferred to [facility] on 5/3/21 and was hospitalized until 5/11/21.  Patient was found to have ascending cholangitis with secondary liver abscess and Streptococcus mitis bacteremia. She also developed SBP. She was initially started don zosyn on 4/30 and then transitioned to ceftriaxone on 5/4. Metronidazole was added on 5/7. She had a repeat paracentesis on 5/3 and 5/7 and both showed improvement of peritonitis. For management of the ascending cholangitis she underwent ERCP on 5/5/21 and biliary stents were exchanged. They did appreciate a 2 cm collection in proximity if the left intra-hepatic duct however they were not able to access it. Repeat abdominal US performed after the ERCP showed cystic lesion on the right hepatic duct (4cm x4.5 cmx 4.1 cm). IR was consulted for possible drainage and they recommended a CT abdomen/pelvis which showed cystic areas on segment 7 and 5. It was not clear if these cystic areas were biliary dilation from her diagnosis of PSC or real abscesses. IR evaluated and stated that they could not safely drain the collections. GI team performed a new ERCP on 5/10 and they again exchanged the drains in the left and right intra-hepatic ducts. They again appreciated the 2 cm collection close to the biliary duct. They again were not able  to drain it. Patient improved clinically.  Her blood cultures cleared since 5/1/21. GI team then planned to perform a new ERCP in 1 month from her dismissal on 5/11/21. Patient will completed her treatment for bacteremia on 5/15 (2 weeks from first negative blood culture on 5/1/21), however she will need to have her antibiotic treatment prolonged to treat the hepatic collection since IR and GI team were not able to safely drain it. Since follow up US and CT scans might confuse the picture (hard to determine what is abscess and what is biliary duct dilation given her diagnosis of PSC) it is best to follow up improvement of the collection with ERCP. I then recommended to continue antibiotics until follow up ERCP and if it shows significant improvement/resolution of infection then we can determine the end date of antibiotics. As mentioned above, the patient was dismissed on 5/11/21 on ceftriaxone and metronidazole at least until 6/14/21, however the final duration will be determined by findings of upcoming ERCP. Most recent labs are from 5/21/21 and showed: WBC: 8.1; H/H: 10.6/33.1 (stable); Plat: 242; Cr: 0.61, BUN: 14; Total bili: 2; AP: 321, AST/ALT within normal limits (trending down);. CRP: 16.1 (trending down)    On today' visit the patient feels overall much improved. She denies fever, chills or night sweats. She is tolerating the IV ceftriaxone and po metronidazole well. She is scheduled for ERCP on 6/25/21.        Recommendations:     1. I will plan to continue metronidazole 500mg PO Q8H and Ceftriaxone 2g IV Q24H at least until ERCP If ERCP shows improvement/resolution of the liver abscess then the antibiotics can be stopped after the ERCP. I will extend the end date to 6/28/21 for now (3 days after the ERCP). Our nurses contacted the home infusion to updated them about the plan.      2. While on antibiotics, please obtain weekly CBC, CMP and CRP (daily CBC and BMP while in hospital). Please continue to fax  to the ID clinic ()     3. For now I will not plan to schedule follow up, however I will follow ERCP results and determine the final duration of antibiotics after I look at the ERCP results     4. PICC in place (no issues)                5. Appreciate continued GI follow up        Recommendations discussed with the patient      Gloria Morocho MD  Date of Service: 05/27/21         History of Present Illness:     Mrs. 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 Patient was transferred to [facility] on 5/3/21 and was hospitalized until 5/11/21.  Patient was found to have ascending cholangitis with secondary liver abscess and Streptococcus mitis bacteremia. She also developed SBP. She was initially started don zosyn on 4/30 and then transitioned to ceftriaxone on 5/4. Metronidazole was added on 5/7. She had a repeat paracentesis on 5/3 and 5/7 and both showed improvement of peritonitis. For management of the ascending cholangitis she underwent ERCP on 5/5/21 and biliary stents were exchanged. They did appreciate a 2 cm collection in proximity if the left intra-hepatic duct however they were not able to access it. Repeat abdominal US performed after the ERCP showed cystic lesion on the right hepatic duct (4cm x4.5 cmx 4.1 cm). IR was consulted for possible drainage and they recommended a CT abdomen/pelvis which showed cystic areas on segment 7 and 5. It was not clear if these cystic areas were biliary dilation from her diagnosis of PSC or real abscesses. IR evaluated and stated that they could not safely drain the collections. GI team performed a new ERCP on 5/10 and they again exchanged the drains in the left and right intra-hepatic ducts. They again  appreciated the 2 cm collection close to the biliary duct. They again were not able to drain it. Patient improved clinically.  Her blood cultures cleared since 5/1/21. GI team then planned to perform a new ERCP in 1 month from her dismissal on 5/11/21. Patient will completed her treatment for bacteremia on 5/15 (2 weeks from first negative blood culture on 5/1/21), however she will need to have her antibiotic treatment prolonged to treat the hepatic collection since IR and GI team were not able to safely drain it. Since follow up US and CT scans might confuse the picture (hard to determine what is abscess and what is biliary duct dilation given her diagnosis of PSC) it is best to follow up improvement of the collection with ERCP. I then recommended to continue antibiotics until follow up ERCP and if it shows significant improvement/resolution of infection then we can determine the end date of antibiotics. As mentioned above, the patient was dismissed on 5/11/21 on ceftriaxone and metronidazole at least until 6/14/21, however the final duration will be determined by findings of upcoming ERCP.     Most recent labs are from 5/21/21 and showed:    - WBC: 8.1; H/H: 10.6/33.1 (stable); Plat: 242  - Cr: 0.61, BUN: 14  - Total bili: 2; AP: 321, AST/ALT within normal limits (trending down)  - CRP: 16.1 (trending down)    Today's visit:    Patient feels overall much improved. She denies fever, chills or night sweats. She is tolerating the IV ceftriaxone and po metronidazole well. She is scheduled for ERCP on 6/25/21.          Review of Systems:     CONSTITUTIONAL:  No fevers or chills  INTEGUMENTARY/SKIN: NEGATIVE for worrisome rashes, moles or lesions  EYES: Negative for icterus, vision changes or irritation  ENT/MOUTH:  Negative for oral lesions and sore throat  RESPIRATORY:  Negative for cough and dyspnea  CARDIOVASCULAR:  Negative for chest pain, palpitations and  shortness of breath  GASTROINTESTINAL:  Negative for  abdominal pain, nausea, vomiting, diarrhea and constipation  GENITOURINARY:  Negative for dysuria, hematuria, frequency and urgency  MUSCULOSKELETAL: Negative for joint pain, swelling, motion restriction, negative for musculoskeletal pain  NEURO:  Negative for headache, altered mental status, numbness or weakness  PSYCHIATRIC: Negative for changes in mood or affect  HEMATOLOGIC/LYMPHATIC: negative for lymphadenopathy or bleeding  ALLERGIC/IMMUNOLOGIC: Negative for allergic reaction   ENDOCRINE: Negative for temperature intolerance, skin/hair changes         Past Medical History:     Past Medical History:   Diagnosis Date     Ascites      Biliary cirrhosis (H)      Cholangitis, sclerosing      Cirrhosis of liver with ascites (H) 3/3/2021     Hearing loss of left ear     wears a hearing aide     History of low potassium      Hyperlipidemia      Hypertension      SBP (spontaneous bacterial peritonitis) (H) 4/30/2021     Sjogren's syndrome (H)      Ulcerative colitis (H)      Ulcerative pancolitis (H)             Allergies:         Allergies   Allergen Reactions     Diagnostic X-Ray Materials Hives     PATIENT HAD HIVE REACTION AFTER ADMINISTERING CT CONTRAST DYE.       Contrast Dye              Family History:     No family history on file.          Social History:     Social History     Socioeconomic History     Marital status:      Spouse name: Not on file     Number of children: Not on file     Years of education: Not on file     Highest education level: Not on file   Occupational History     Not on file   Social Needs     Financial resource strain: Not on file     Food insecurity     Worry: Not on file     Inability: Not on file     Transportation needs     Medical: Not on file     Non-medical: Not on file   Tobacco Use     Smoking status: Never Smoker     Smokeless tobacco: Never Used   Substance and Sexual Activity     Alcohol use: No     Comment: Last drink was 2017     Drug use: No     Sexual activity:  Not on file   Lifestyle     Physical activity     Days per week: Not on file     Minutes per session: Not on file     Stress: Not on file   Relationships     Social connections     Talks on phone: Not on file     Gets together: Not on file     Attends Gnosticism service: Not on file     Active member of club or organization: Not on file     Attends meetings of clubs or organizations: Not on file     Relationship status: Not on file     Intimate partner violence     Fear of current or ex partner: Not on file     Emotionally abused: Not on file     Physically abused: Not on file     Forced sexual activity: Not on file   Other Topics Concern     Parent/sibling w/ CABG, MI or angioplasty before 65F 55M? Not Asked   Social History Narrative     Not on file                 Physical Exam:       Exam:  GENERAL:  Well-developed, well-nourished, not in acute distress.   HEAD: Normocephalic and atraumatic  ENT:  No hearing impairment  EYES:  Eyes grossly normal to inspection, PERRL and conjunctivae and sclerae normal   LUNGS: Breathing comfortably on room air  SKIN:  No acute rashes or suspicious lesions  NEUROLOGIC:  Grossly nonfocal. Mentation intact and speech normal  PSYCHIATRIC: Mood stable, mentation appears normal, affect normal

## 2021-05-25 ENCOUNTER — HOME INFUSION (PRE-WILLOW HOME INFUSION) (OUTPATIENT)
Dept: PHARMACY | Facility: CLINIC | Age: 56
End: 2021-05-25

## 2021-05-25 ENCOUNTER — MEDICAL CORRESPONDENCE (OUTPATIENT)
Dept: HEALTH INFORMATION MANAGEMENT | Facility: CLINIC | Age: 56
End: 2021-05-25

## 2021-05-25 LAB
ALBUMIN SERPL-MCNC: 2.4 G/DL (ref 3.4–5)
ALP SERPL-CCNC: 307 U/L (ref 40–150)
ALT SERPL W P-5'-P-CCNC: 27 U/L (ref 0–50)
ANION GAP SERPL CALCULATED.3IONS-SCNC: 7 MMOL/L (ref 3–14)
AST SERPL W P-5'-P-CCNC: 42 U/L (ref 0–45)
BILIRUB SERPL-MCNC: 1.5 MG/DL (ref 0.2–1.3)
BUN SERPL-MCNC: 15 MG/DL (ref 7–30)
CALCIUM SERPL-MCNC: 8.2 MG/DL (ref 8.5–10.1)
CHLORIDE SERPL-SCNC: 106 MMOL/L (ref 94–109)
CO2 SERPL-SCNC: 19 MMOL/L (ref 20–32)
CREAT SERPL-MCNC: 0.64 MG/DL (ref 0.52–1.04)
CRP SERPL-MCNC: 14.3 MG/L (ref 0–8)
ERYTHROCYTE [DISTWIDTH] IN BLOOD BY AUTOMATED COUNT: 16.9 % (ref 10–15)
GFR SERPL CREATININE-BSD FRML MDRD: >90 ML/MIN/{1.73_M2}
GLUCOSE SERPL-MCNC: 109 MG/DL (ref 70–99)
HCT VFR BLD AUTO: 33.3 % (ref 35–47)
HGB BLD-MCNC: 10.9 G/DL (ref 11.7–15.7)
MCH RBC QN AUTO: 31.1 PG (ref 26.5–33)
MCHC RBC AUTO-ENTMCNC: 32.7 G/DL (ref 31.5–36.5)
MCV RBC AUTO: 95 FL (ref 78–100)
PLATELET # BLD AUTO: 301 10E9/L (ref 150–450)
POTASSIUM SERPL-SCNC: 4 MMOL/L (ref 3.4–5.3)
PROT SERPL-MCNC: 6.5 G/DL (ref 6.8–8.8)
RBC # BLD AUTO: 3.51 10E12/L (ref 3.8–5.2)
SODIUM SERPL-SCNC: 132 MMOL/L (ref 133–144)
WBC # BLD AUTO: 8.8 10E9/L (ref 4–11)

## 2021-05-25 PROCEDURE — 80053 COMPREHEN METABOLIC PANEL: CPT | Performed by: INTERNAL MEDICINE

## 2021-05-25 PROCEDURE — 85027 COMPLETE CBC AUTOMATED: CPT | Performed by: INTERNAL MEDICINE

## 2021-05-25 PROCEDURE — 86140 C-REACTIVE PROTEIN: CPT | Performed by: INTERNAL MEDICINE

## 2021-05-26 ENCOUNTER — HOME INFUSION (PRE-WILLOW HOME INFUSION) (OUTPATIENT)
Dept: PHARMACY | Facility: CLINIC | Age: 56
End: 2021-05-26

## 2021-05-26 ENCOUNTER — VIRTUAL VISIT (OUTPATIENT)
Dept: INFECTIOUS DISEASES | Facility: CLINIC | Age: 56
End: 2021-05-26
Attending: INTERNAL MEDICINE
Payer: COMMERCIAL

## 2021-05-26 DIAGNOSIS — K75.0 LIVER ABSCESS: Primary | ICD-10-CM

## 2021-05-26 PROCEDURE — 99214 OFFICE O/P EST MOD 30 MIN: CPT | Performed by: INTERNAL MEDICINE

## 2021-05-26 NOTE — PROGRESS NOTES
Berta is a 55 year old who is being evaluated via a billable video visit.      How would you like to obtain your AVS? MyChart  If the video visit is dropped, the invitation should be resent by: Text to cell phone: [phone]  Will anyone else be joining your video visit? No        Video-Visit Details    Type of service:  Video Visit    Video Start Time: 12:00 pm    Video End Time: 12:30 pm    Originating Location (pt. Location): Home    Distant Location (provider location):  [facility] [place]     Platform used for Video Visit: Patricia BRAGG CMA

## 2021-05-26 NOTE — LETTER
5/26/2021       RE: Berta Nava  [address] [facility] [id number]     Dear Colleague,    Thank you for referring your patient, Berta Nava, to the [facility] INFECTIOUS DISEASE CLINIC [place] at [facility]. Please see a copy of my visit note below.       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 Patient was transferred to [facility] on 5/3/21 and was hospitalized until 5/11/21.  Patient was found to have ascending cholangitis with secondary liver abscess and Streptococcus mitis bacteremia. She also developed SBP. She was initially started don zosyn on 4/30 and then transitioned to ceftriaxone on 5/4. Metronidazole was added on 5/7. She had a repeat paracentesis on 5/3 and 5/7 and both showed improvement of peritonitis. For management of the ascending cholangitis she underwent ERCP on 5/5/21 and biliary stents were exchanged. They did appreciate a 2 cm collection in proximity if the left intra-hepatic duct however they were not able to access it. Repeat abdominal US performed after the ERCP showed cystic lesion on the right hepatic duct (4cm x4.5 cmx 4.1 cm). IR was consulted for possible drainage and they recommended a CT abdomen/pelvis which showed cystic areas on segment 7 and 5. It was not clear if these cystic areas were biliary dilation  from her diagnosis of PSC or real abscesses. IR evaluated and stated that they could not safely drain the collections. GI team performed a new ERCP on 5/10 and they again exchanged the drains in the left and right intra-hepatic ducts. They again appreciated the 2 cm collection close to the biliary duct. They again were not able to drain it. Patient improved clinically.  Her blood cultures cleared since 5/1/21. GI team then planned to perform a new ERCP in 1 month from her dismissal on 5/11/21. Patient will completed her treatment for bacteremia on 5/15 (2 weeks from first negative blood culture on 5/1/21), however she will need to have her antibiotic treatment prolonged to treat the hepatic collection since IR and GI team were not able to safely drain it. Since follow up US and CT scans might confuse the picture (hard to determine what is abscess and what is biliary duct dilation given her diagnosis of PSC) it is best to follow up improvement of the collection with ERCP. I then recommended to continue antibiotics until follow up ERCP and if it shows significant improvement/resolution of infection then we can determine the end date of antibiotics. As mentioned above, the patient was dismissed on 5/11/21 on ceftriaxone and metronidazole at least until 6/14/21, however the final duration will be determined by findings of upcoming ERCP. Most recent labs are from 5/21/21 and showed: WBC: 8.1; H/H: 10.6/33.1 (stable); Plat: 242; Cr: 0.61, BUN: 14; Total bili: 2; AP: 321, AST/ALT within normal limits (trending down);. CRP: 16.1 (trending down)    On today' visit the patient feels overall much improved. She denies fever, chills or night sweats. She is tolerating the IV ceftriaxone and po metronidazole well. She is scheduled for ERCP on 6/25/21.        Recommendations:     1. I will plan to continue metronidazole 500mg PO Q8H and Ceftriaxone 2g IV Q24H at least until ERCP If ERCP shows improvement/resolution of the liver  abscess then the antibiotics can be stopped after the ERCP. I will extend the end date to 6/28/21 for now (3 days after the ERCP). Our nurses contacted the home infusion to updated them about the plan.      2. While on antibiotics, please obtain weekly CBC, CMP and CRP (daily CBC and BMP while in hospital). Please continue to fax to the ID clinic ()     3. For now I will not plan to schedule follow up, however I will follow ERCP results and determine the final duration of antibiotics after I look at the ERCP results     4. PICC in place (no issues)                5. Appreciate continued GI follow up        Recommendations discussed with the patient      Gloria Morocho MD  Date of Service: 05/27/21         History of Present Illness:     Mrs. 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 Patient was transferred to [facility] on 5/3/21 and was hospitalized until 5/11/21.  Patient was found to have ascending cholangitis with secondary liver abscess and Streptococcus mitis bacteremia. She also developed SBP. She was initially started don zosyn on 4/30 and then transitioned to ceftriaxone on 5/4. Metronidazole was added on 5/7. She had a repeat paracentesis on 5/3 and 5/7 and both showed improvement of peritonitis. For management of the ascending cholangitis she underwent ERCP on 5/5/21 and biliary stents were exchanged. They did appreciate a 2 cm collection in proximity if the left intra-hepatic duct however they were not able to access it. Repeat abdominal US performed after the ERCP showed cystic lesion on the right hepatic duct (4cm x4.5 cmx 4.1 cm). IR was consulted for possible drainage and they recommended a CT abdomen/pelvis which showed cystic  areas on segment 7 and 5. It was not clear if these cystic areas were biliary dilation from her diagnosis of PSC or real abscesses. IR evaluated and stated that they could not safely drain the collections. GI team performed a new ERCP on 5/10 and they again exchanged the drains in the left and right intra-hepatic ducts. They again appreciated the 2 cm collection close to the biliary duct. They again were not able to drain it. Patient improved clinically.  Her blood cultures cleared since 5/1/21. GI team then planned to perform a new ERCP in 1 month from her dismissal on 5/11/21. Patient will completed her treatment for bacteremia on 5/15 (2 weeks from first negative blood culture on 5/1/21), however she will need to have her antibiotic treatment prolonged to treat the hepatic collection since IR and GI team were not able to safely drain it. Since follow up US and CT scans might confuse the picture (hard to determine what is abscess and what is biliary duct dilation given her diagnosis of PSC) it is best to follow up improvement of the collection with ERCP. I then recommended to continue antibiotics until follow up ERCP and if it shows significant improvement/resolution of infection then we can determine the end date of antibiotics. As mentioned above, the patient was dismissed on 5/11/21 on ceftriaxone and metronidazole at least until 6/14/21, however the final duration will be determined by findings of upcoming ERCP.     Most recent labs are from 5/21/21 and showed:    - WBC: 8.1; H/H: 10.6/33.1 (stable); Plat: 242  - Cr: 0.61, BUN: 14  - Total bili: 2; AP: 321, AST/ALT within normal limits (trending down)  - CRP: 16.1 (trending down)    Today's visit:    Patient feels overall much improved. She denies fever, chills or night sweats. She is tolerating the IV ceftriaxone and po metronidazole well. She is scheduled for ERCP on 6/25/21.          Review of Systems:     CONSTITUTIONAL:  No fevers or  chills  INTEGUMENTARY/SKIN: NEGATIVE for worrisome rashes, moles or lesions  EYES: Negative for icterus, vision changes or irritation  ENT/MOUTH:  Negative for oral lesions and sore throat  RESPIRATORY:  Negative for cough and dyspnea  CARDIOVASCULAR:  Negative for chest pain, palpitations and  shortness of breath  GASTROINTESTINAL:  Negative for abdominal pain, nausea, vomiting, diarrhea and constipation  GENITOURINARY:  Negative for dysuria, hematuria, frequency and urgency  MUSCULOSKELETAL: Negative for joint pain, swelling, motion restriction, negative for musculoskeletal pain  NEURO:  Negative for headache, altered mental status, numbness or weakness  PSYCHIATRIC: Negative for changes in mood or affect  HEMATOLOGIC/LYMPHATIC: negative for lymphadenopathy or bleeding  ALLERGIC/IMMUNOLOGIC: Negative for allergic reaction   ENDOCRINE: Negative for temperature intolerance, skin/hair changes         Past Medical History:     Past Medical History:   Diagnosis Date     Ascites      Biliary cirrhosis (H)      Cholangitis, sclerosing      Cirrhosis of liver with ascites (H) 3/3/2021     Hearing loss of left ear     wears a hearing aide     History of low potassium      Hyperlipidemia      Hypertension      SBP (spontaneous bacterial peritonitis) (H) 4/30/2021     Sjogren's syndrome (H)      Ulcerative colitis (H)      Ulcerative pancolitis (H)             Allergies:         Allergies   Allergen Reactions     Diagnostic [employer] Hives     PATIENT HAD HIVE REACTION AFTER ADMINISTERING CT CONTRAST DYE.       Contrast Dye              Family History:     No family history on file.          Social History:     Social History     Socioeconomic History     Marital status:      Spouse name: Not on file     Number of children: Not on file     Years of education: Not on file     Highest education level: Not on file   Occupational History     Not on file   Social Needs     Financial resource strain: Not on file      Food insecurity     Worry: Not on file     Inability: Not on file     Transportation needs     Medical: Not on file     Non-medical: Not on file   Tobacco Use     Smoking status: Never Smoker     Smokeless tobacco: Never Used   Substance and Sexual Activity     Alcohol use: No     Comment: Last drink was 2017     Drug use: No     Sexual activity: Not on file   Lifestyle     Physical activity     Days per week: Not on file     Minutes per session: Not on file     Stress: Not on file   Relationships     Social connections     Talks on phone: Not on file     Gets together: Not on file     Attends Jewish service: Not on file     Active member of club or organization: Not on file     Attends meetings of clubs or organizations: Not on file     Relationship status: Not on file     Intimate partner violence     Fear of current or ex partner: Not on file     Emotionally abused: Not on file     Physically abused: Not on file     Forced sexual activity: Not on file   Other Topics Concern     Parent/sibling w/ CABG, MI or angioplasty before 65F 55M? Not Asked   Social History Narrative     Not on file                 Physical Exam:       Exam:  GENERAL:  Well-developed, well-nourished, not in acute distress.   HEAD: Normocephalic and atraumatic  ENT:  No hearing impairment  EYES:  Eyes grossly normal to inspection, PERRL and conjunctivae and sclerae normal   LUNGS: Breathing comfortably on room air  SKIN:  No acute rashes or suspicious lesions  NEUROLOGIC:  Grossly nonfocal. Mentation intact and speech normal  PSYCHIATRIC: Mood stable, mentation appears normal, affect normal            Berta is a 55 year old who is being evaluated via a billable video visit.      How would you like to obtain your AVS? MyChart  If the video visit is dropped, the invitation should be resent by: Text to cell phone: [phone]  Will anyone else be joining your video visit? No        Video-Visit Details    Type of service:  Video  Visit    Video Start Time: 12:00 pm    Video End Time: 12:30 pm    Originating Location (pt. Location): Home    Distant Location (provider location):  [facility] INFECTIOUS DISEASE CLINIC [place]     Platform used for Video Visit: Patricia BRAGG CMA

## 2021-05-27 ENCOUNTER — TELEPHONE (OUTPATIENT)
Dept: TRANSPLANT | Facility: CLINIC | Age: 56
End: 2021-05-27

## 2021-05-27 NOTE — TELEPHONE ENCOUNTER
Return to work letter sent to patient, the fax number she proved in her [employer] message, and her referring / PCP providers.

## 2021-05-27 NOTE — PROGRESS NOTES
This is a recent snapshot of the patient's [facility] medical record.  For current drug dose and complete information and questions, call [phone] or In Basket pool, fv home infusion ([id number])  CSN Number:  [id number]

## 2021-05-27 NOTE — LETTER
May 27, 2021     RE:Berta Nava     : 1965      To whom it may concern,     Meaghan is under the care on of the [facility] hepatology group.     She has recovered from her most recent hospital stay and is approved to return to work.    The patient may still need additional breaks due to fatigue that is common with our patients. She may also need time off work for follow up appointment with our team.     If you have any questions please contact her transplant coordinator, Aaron Lazar RN, directly at [phone].    Thank you for the opportunity to participate in [facility] branch. Please do not hesitate to contact us for any additional information.    Sincerely,      Thomas M. Leventhal, M.D.   of Medicine  Advanced & Transplant Hepatology  [facility]  [phone]

## 2021-05-28 ENCOUNTER — HOSPITAL ENCOUNTER (OUTPATIENT)
Dept: ULTRASOUND IMAGING | Facility: CLINIC | Age: 56
Discharge: HOME | End: 2021-05-28
Attending: INTERNAL MEDICINE | Admitting: INTERNAL MEDICINE
Payer: COMMERCIAL

## 2021-05-28 VITALS
DIASTOLIC BLOOD PRESSURE: 62 MMHG | OXYGEN SATURATION: 100 % | SYSTOLIC BLOOD PRESSURE: 103 MMHG | HEART RATE: 76 BPM | RESPIRATION RATE: 18 BRPM

## 2021-05-28 DIAGNOSIS — K74.60 CIRRHOSIS OF LIVER WITH ASCITES, UNSPECIFIED HEPATIC CIRRHOSIS TYPE (H): ICD-10-CM

## 2021-05-28 DIAGNOSIS — R18.8 CIRRHOSIS OF LIVER WITH ASCITES, UNSPECIFIED HEPATIC CIRRHOSIS TYPE (H): ICD-10-CM

## 2021-05-28 PROCEDURE — 250N000011 HC RX IP 250 OP 636

## 2021-05-28 PROCEDURE — 250N000009 HC RX 250: Performed by: RADIOLOGY

## 2021-05-28 PROCEDURE — P9047 ALBUMIN (HUMAN), 25%, 50ML: HCPCS

## 2021-05-28 PROCEDURE — 49083 ABD PARACENTESIS W/IMAGING: CPT

## 2021-05-28 RX ORDER — HEPARIN SODIUM,PORCINE 10 UNIT/ML
VIAL (ML) INTRAVENOUS
Status: COMPLETED
Start: 2021-05-28 | End: 2021-05-28

## 2021-05-28 RX ORDER — HEPARIN SODIUM,PORCINE 10 UNIT/ML
5-10 VIAL (ML) INTRAVENOUS
Status: DISCONTINUED | OUTPATIENT
Start: 2021-05-28 | End: 2021-05-29 | Stop reason: HOSPADM

## 2021-05-28 RX ORDER — ALBUMIN (HUMAN) 12.5 G/50ML
25 SOLUTION INTRAVENOUS ONCE
Status: COMPLETED | OUTPATIENT
Start: 2021-05-28 | End: 2021-05-28

## 2021-05-28 RX ORDER — ALBUMIN (HUMAN) 12.5 G/50ML
SOLUTION INTRAVENOUS
Status: COMPLETED
Start: 2021-05-28 | End: 2021-05-28

## 2021-05-28 RX ADMIN — ALBUMIN HUMAN 25 G: 0.25 SOLUTION INTRAVENOUS at 13:39

## 2021-05-28 RX ADMIN — ALBUMIN (HUMAN) 25 G: 12.5 SOLUTION INTRAVENOUS at 13:39

## 2021-05-28 RX ADMIN — Medication 5 ML: at 14:02

## 2021-05-28 RX ADMIN — LIDOCAINE HYDROCHLORIDE 10 ML: 10 INJECTION, SOLUTION EPIDURAL; INFILTRATION; INTRACAUDAL; PERINEURAL at 13:11

## 2021-05-28 RX ADMIN — HEPARIN, PORCINE (PF) 10 UNIT/ML INTRAVENOUS SYRINGE 5 ML: at 14:02

## 2021-05-28 NOTE — PROGRESS NOTES
Patient tolerated paracentesis well. Skin, warm and dry, color pink. 5650 mls of clear fabiola fluid removed from left side. Insertion site clean and dry, Band-Aid dressing applied no drainage or bleeding noted. Vital signs stable. Patient discharged home with instructions, states understands and has no questions at this time. Patient left ambulatory in stable condition.

## 2021-05-28 NOTE — PROCEDURES
[facility]    Procedure: Paracentesis    Date/Time: 5/28/2021 1:26 PM  Performed by: Rick Richards MD  Authorized by: Rick Richards MD     UNIVERSAL PROTOCOL   Site Marked: Yes  Prior Images Obtained and Reviewed:  Yes  Required items: Required blood products, implants, devices and special equipment available    Patient identity confirmed:  Verbally with patient, arm band and provided demographic data  NA - No sedation, light sedation, or local anesthesia  Confirmation Checklist:  Patient's identity using two indicators, relevant allergies, procedure was appropriate and matched the consent or emergent situation and correct equipment/implants were available  Time out: Immediately prior to the procedure a time out was called    Universal Protocol: the Joint Commission Universal Protocol was followed    Preparation: Patient was prepped and draped in usual sterile fashion    ESBL (mL):  0         ANESTHESIA    Anesthesia: Local infiltration  Local Anesthetic:  Lidocaine 1% without epinephrine  Anesthetic Total (mL):  10      SEDATION    Patient Sedated: No    See dictated procedure note for full details.  PROCEDURE   Patient Tolerance:  Patient tolerated the procedure well with no immediate complications  Describe Procedure: LLQ Paracentesis for therapeutic intent  Length of time physician/provider present for 1:1 monitoring during sedation: 0

## 2021-05-31 DIAGNOSIS — Z11.59 ENCOUNTER FOR SCREENING FOR OTHER VIRAL DISEASES: ICD-10-CM

## 2021-06-01 ENCOUNTER — HOME INFUSION (PRE-WILLOW HOME INFUSION) (OUTPATIENT)
Dept: PHARMACY | Facility: CLINIC | Age: 56
End: 2021-06-01

## 2021-06-01 ENCOUNTER — MEDICAL CORRESPONDENCE (OUTPATIENT)
Dept: HEALTH INFORMATION MANAGEMENT | Facility: CLINIC | Age: 56
End: 2021-06-01

## 2021-06-01 DIAGNOSIS — Z11.59 ENCOUNTER FOR SCREENING FOR OTHER VIRAL DISEASES: ICD-10-CM

## 2021-06-01 LAB
ALBUMIN SERPL-MCNC: 2.5 G/DL (ref 3.4–5)
ALP SERPL-CCNC: 275 U/L (ref 40–150)
ALT SERPL W P-5'-P-CCNC: 24 U/L (ref 0–50)
ANION GAP SERPL CALCULATED.3IONS-SCNC: 5 MMOL/L (ref 3–14)
AST SERPL W P-5'-P-CCNC: 43 U/L (ref 0–45)
BASOPHILS # BLD AUTO: 0.1 10E9/L (ref 0–0.2)
BASOPHILS NFR BLD AUTO: 0.5 %
BILIRUB SERPL-MCNC: 1.7 MG/DL (ref 0.2–1.3)
BUN SERPL-MCNC: 20 MG/DL (ref 7–30)
CALCIUM SERPL-MCNC: 8.8 MG/DL (ref 8.5–10.1)
CHLORIDE SERPL-SCNC: 104 MMOL/L (ref 94–109)
CO2 SERPL-SCNC: 21 MMOL/L (ref 20–32)
CREAT SERPL-MCNC: 0.59 MG/DL (ref 0.52–1.04)
CRP SERPL-MCNC: 16.1 MG/L (ref 0–8)
DIFFERENTIAL METHOD BLD: ABNORMAL
EOSINOPHIL # BLD AUTO: 0.2 10E9/L (ref 0–0.7)
EOSINOPHIL NFR BLD AUTO: 1.7 %
ERYTHROCYTE [DISTWIDTH] IN BLOOD BY AUTOMATED COUNT: 15.8 % (ref 10–15)
GFR SERPL CREATININE-BSD FRML MDRD: >90 ML/MIN/{1.73_M2}
GLUCOSE SERPL-MCNC: 130 MG/DL (ref 70–99)
HCT VFR BLD AUTO: 34.9 % (ref 35–47)
HGB BLD-MCNC: 11.3 G/DL (ref 11.7–15.7)
IMM GRANULOCYTES # BLD: 0.1 10E9/L (ref 0–0.4)
IMM GRANULOCYTES NFR BLD: 1 %
LYMPHOCYTES # BLD AUTO: 0.9 10E9/L (ref 0.8–5.3)
LYMPHOCYTES NFR BLD AUTO: 8 %
MCH RBC QN AUTO: 30.1 PG (ref 26.5–33)
MCHC RBC AUTO-ENTMCNC: 32.4 G/DL (ref 31.5–36.5)
MCV RBC AUTO: 93 FL (ref 78–100)
MONOCYTES # BLD AUTO: 0.9 10E9/L (ref 0–1.3)
MONOCYTES NFR BLD AUTO: 8 %
NEUTROPHILS # BLD AUTO: 9.3 10E9/L (ref 1.6–8.3)
NEUTROPHILS NFR BLD AUTO: 80.8 %
NRBC # BLD AUTO: 0 10*3/UL
NRBC BLD AUTO-RTO: 0 /100
PLATELET # BLD AUTO: 386 10E9/L (ref 150–450)
POTASSIUM SERPL-SCNC: 3.9 MMOL/L (ref 3.4–5.3)
PROT SERPL-MCNC: 6.7 G/DL (ref 6.8–8.8)
RBC # BLD AUTO: 3.75 10E12/L (ref 3.8–5.2)
SARS-COV-2 RNA RESP QL NAA+PROBE: NORMAL
SODIUM SERPL-SCNC: 130 MMOL/L (ref 133–144)
SPECIMEN SOURCE: NORMAL
WBC # BLD AUTO: 11.5 10E9/L (ref 4–11)

## 2021-06-01 PROCEDURE — 80053 COMPREHEN METABOLIC PANEL: CPT | Performed by: INTERNAL MEDICINE

## 2021-06-01 PROCEDURE — 86140 C-REACTIVE PROTEIN: CPT | Performed by: INTERNAL MEDICINE

## 2021-06-01 PROCEDURE — U0005 INFEC AGEN DETEC AMPLI PROBE: HCPCS | Performed by: INTERNAL MEDICINE

## 2021-06-01 PROCEDURE — 85025 COMPLETE CBC W/AUTO DIFF WBC: CPT | Performed by: INTERNAL MEDICINE

## 2021-06-01 PROCEDURE — U0003 INFECTIOUS AGENT DETECTION BY NUCLEIC ACID (DNA OR RNA); SEVERE ACUTE RESPIRATORY SYNDROME CORONAVIRUS 2 (SARS-COV-2) (CORONAVIRUS DISEASE [COVID-19]), AMPLIFIED PROBE TECHNIQUE, MAKING USE OF HIGH THROUGHPUT TECHNOLOGIES AS DESCRIBED BY CMS-2020-01-R: HCPCS | Performed by: INTERNAL MEDICINE

## 2021-06-01 NOTE — PROGRESS NOTES
This is a recent snapshot of the patient's [facility] medical record.  For current drug dose and complete information and questions, call [phone] or In Basket pool, fv home infusion ([id number])  CSN Number:  [id number]

## 2021-06-02 ENCOUNTER — HOME INFUSION (PRE-WILLOW HOME INFUSION) (OUTPATIENT)
Dept: PHARMACY | Facility: CLINIC | Age: 56
End: 2021-06-02

## 2021-06-02 NOTE — PROGRESS NOTES
This is a recent snapshot of the patient's [facility] medical record.  For current drug dose and complete information and questions, call [phone] or In Basket pool, fv home infusion ([id number])  CSN Number:  [id number]

## 2021-06-03 NOTE — PROGRESS NOTES
This is a recent snapshot of the patient's [facility] medical record.  For current drug dose and complete information and questions, call [phone] or In Basket pool, fv home infusion ([id number])  CSN Number:  [id number]

## 2021-06-04 ENCOUNTER — DOCUMENTATION ONLY (OUTPATIENT)
Dept: INFECTIOUS DISEASES | Facility: CLINIC | Age: 56
End: 2021-06-04

## 2021-06-04 ENCOUNTER — HOSPITAL ENCOUNTER (OUTPATIENT)
Dept: ULTRASOUND IMAGING | Facility: CLINIC | Age: 56
Discharge: HOME | End: 2021-06-04
Attending: INTERNAL MEDICINE | Admitting: INTERNAL MEDICINE
Payer: COMMERCIAL

## 2021-06-04 VITALS — HEART RATE: 80 BPM | DIASTOLIC BLOOD PRESSURE: 66 MMHG | SYSTOLIC BLOOD PRESSURE: 101 MMHG

## 2021-06-04 DIAGNOSIS — R18.8 CIRRHOSIS OF LIVER WITH ASCITES, UNSPECIFIED HEPATIC CIRRHOSIS TYPE (H): ICD-10-CM

## 2021-06-04 DIAGNOSIS — K74.60 CIRRHOSIS OF LIVER WITH ASCITES, UNSPECIFIED HEPATIC CIRRHOSIS TYPE (H): ICD-10-CM

## 2021-06-04 PROCEDURE — P9047 ALBUMIN (HUMAN), 25%, 50ML: HCPCS

## 2021-06-04 PROCEDURE — 49083 ABD PARACENTESIS W/IMAGING: CPT

## 2021-06-04 PROCEDURE — 250N000009 HC RX 250: Performed by: RADIOLOGY

## 2021-06-04 PROCEDURE — 250N000011 HC RX IP 250 OP 636

## 2021-06-04 RX ORDER — HEPARIN SODIUM (PORCINE) LOCK FLUSH IV SOLN 100 UNIT/ML 100 UNIT/ML
SOLUTION INTRAVENOUS
Status: COMPLETED
Start: 2021-06-04 | End: 2021-06-04

## 2021-06-04 RX ORDER — ALBUMIN (HUMAN) 12.5 G/50ML
25 SOLUTION INTRAVENOUS ONCE
Status: COMPLETED | OUTPATIENT
Start: 2021-06-04 | End: 2021-06-04

## 2021-06-04 RX ORDER — HEPARIN SODIUM (PORCINE) LOCK FLUSH IV SOLN 100 UNIT/ML 100 UNIT/ML
5 SOLUTION INTRAVENOUS ONCE
Status: COMPLETED | OUTPATIENT
Start: 2021-06-04 | End: 2021-06-04

## 2021-06-04 RX ORDER — ALBUMIN (HUMAN) 12.5 G/50ML
12.5 SOLUTION INTRAVENOUS ONCE
Status: CANCELLED | OUTPATIENT
Start: 2021-06-04 | End: 2021-06-04

## 2021-06-04 RX ORDER — LIDOCAINE HYDROCHLORIDE 10 MG/ML
10 INJECTION, SOLUTION EPIDURAL; INFILTRATION; INTRACAUDAL; PERINEURAL ONCE
Status: COMPLETED | OUTPATIENT
Start: 2021-06-04 | End: 2021-06-04

## 2021-06-04 RX ORDER — ALBUMIN (HUMAN) 12.5 G/50ML
SOLUTION INTRAVENOUS
Status: COMPLETED
Start: 2021-06-04 | End: 2021-06-04

## 2021-06-04 RX ADMIN — LIDOCAINE HYDROCHLORIDE 10 ML: 10 INJECTION, SOLUTION EPIDURAL; INFILTRATION; INTRACAUDAL; PERINEURAL at 14:09

## 2021-06-04 RX ADMIN — ALBUMIN HUMAN 25 G: 0.25 SOLUTION INTRAVENOUS at 13:45

## 2021-06-04 RX ADMIN — HEPARIN 5 ML: 100 SYRINGE at 14:09

## 2021-06-04 RX ADMIN — HEPARIN SODIUM (PORCINE) LOCK FLUSH IV SOLN 100 UNIT/ML 5 ML: 100 SOLUTION at 14:09

## 2021-06-04 RX ADMIN — ALBUMIN (HUMAN) 25 G: 12.5 SOLUTION INTRAVENOUS at 13:45

## 2021-06-04 NOTE — PROCEDURES
[facility]    Procedure: US guided paracentesis    Date/Time: 6/4/2021 1:57 PM  Performed by: Scot Barrientos MD  Authorized by: Scot Barrientos MD     UNIVERSAL PROTOCOL   Site Marked: NA  Prior Images Obtained and Reviewed:  Yes  Required items: Required blood products, implants, devices and special equipment available    Patient identity confirmed:  Verbally with patient, arm band, provided demographic data and hospital-assigned identification number  Patient was reevaluated immediately before administering moderate or deep sedation or anesthesia  Confirmation Checklist:  Patient's identity using two indicators, relevant allergies, procedure was appropriate and matched the consent or emergent situation and correct equipment/implants were available  Time out: Immediately prior to the procedure a time out was called    Universal Protocol: the Joint Commission Universal Protocol was followed    Preparation: Patient was prepped and draped in usual sterile fashion    ESBL (mL):  2         ANESTHESIA    Anesthesia: Local infiltration  Local Anesthetic:  Lidocaine 1% without epinephrine      SEDATION    Patient Sedated: No    See dictated procedure note for full details.  Findings: US guided RLQ paracentesis    Specimens: none    Complications: None    Condition: Stable    PROCEDURE   Patient Tolerance:  Patient tolerated the procedure well with no immediate complications    Length of time physician/provider present for 1:1 monitoring during sedation: 0

## 2021-06-04 NOTE — PROGRESS NOTES
Pt was in Radiology today for a paracentesis. Procedure performed by Dr Dean Procedure was tolerated well, vitals remained stable.5500 cc's of slightly cloudy yellow colored fluid removed. 25 Grams Albumin instilled per protocol. Written and verbal instructions were reviewed here is no evidence of bleeding upon discharge.  Pt left department in stable satisfactory condition with self.

## 2021-06-04 NOTE — PROGRESS NOTES
Labs (6/1/21):    - WBC: 11.5 (minimally above the normal range which is 4-11); H/H: 11.3/34.9 (improved from last week); Plat: 386    - Cr: 0.59; BUN: 20    - Alk phos: 275 (improved from last week); AST and ALT within normal limits; T bili: 1.7 (stable from last week)    - CRP: 16.1 (just minimally elevated from last week when it was 14 - overall stable)    I will continue to follow

## 2021-06-08 ENCOUNTER — HOME INFUSION (PRE-WILLOW HOME INFUSION) (OUTPATIENT)
Dept: PHARMACY | Facility: CLINIC | Age: 56
End: 2021-06-08

## 2021-06-08 ENCOUNTER — MEDICAL CORRESPONDENCE (OUTPATIENT)
Dept: HEALTH INFORMATION MANAGEMENT | Facility: CLINIC | Age: 56
End: 2021-06-08

## 2021-06-08 LAB
ALBUMIN SERPL-MCNC: 2.4 G/DL (ref 3.4–5)
ALP SERPL-CCNC: 285 U/L (ref 40–150)
ALT SERPL W P-5'-P-CCNC: 25 U/L (ref 0–50)
ANION GAP SERPL CALCULATED.3IONS-SCNC: 9 MMOL/L (ref 3–14)
AST SERPL W P-5'-P-CCNC: 47 U/L (ref 0–45)
BILIRUB SERPL-MCNC: 1.5 MG/DL (ref 0.2–1.3)
BUN SERPL-MCNC: 24 MG/DL (ref 7–30)
CALCIUM SERPL-MCNC: 8.6 MG/DL (ref 8.5–10.1)
CHLORIDE SERPL-SCNC: 101 MMOL/L (ref 94–109)
CO2 SERPL-SCNC: 19 MMOL/L (ref 20–32)
CREAT SERPL-MCNC: 0.62 MG/DL (ref 0.52–1.04)
CRP SERPL-MCNC: 23.1 MG/L (ref 0–8)
ERYTHROCYTE [DISTWIDTH] IN BLOOD BY AUTOMATED COUNT: 15.1 % (ref 10–15)
GFR SERPL CREATININE-BSD FRML MDRD: >90 ML/MIN/{1.73_M2}
GLUCOSE SERPL-MCNC: 86 MG/DL (ref 70–99)
HCT VFR BLD AUTO: 34 % (ref 35–47)
HGB BLD-MCNC: 11.2 G/DL (ref 11.7–15.7)
MCH RBC QN AUTO: 30.6 PG (ref 26.5–33)
MCHC RBC AUTO-ENTMCNC: 32.9 G/DL (ref 31.5–36.5)
MCV RBC AUTO: 93 FL (ref 78–100)
PLATELET # BLD AUTO: 373 10E9/L (ref 150–450)
POTASSIUM SERPL-SCNC: 4.2 MMOL/L (ref 3.4–5.3)
PROT SERPL-MCNC: 7.2 G/DL (ref 6.8–8.8)
RBC # BLD AUTO: 3.66 10E12/L (ref 3.8–5.2)
SODIUM SERPL-SCNC: 129 MMOL/L (ref 133–144)
WBC # BLD AUTO: 14.3 10E9/L (ref 4–11)

## 2021-06-08 PROCEDURE — 86140 C-REACTIVE PROTEIN: CPT | Performed by: INTERNAL MEDICINE

## 2021-06-08 PROCEDURE — 80053 COMPREHEN METABOLIC PANEL: CPT | Performed by: INTERNAL MEDICINE

## 2021-06-08 PROCEDURE — 85027 COMPLETE CBC AUTOMATED: CPT | Performed by: INTERNAL MEDICINE

## 2021-06-09 ENCOUNTER — HOSPITAL ENCOUNTER (INPATIENT)
Facility: CLINIC | Age: 56
LOS: 3 days | Discharge: HOME | DRG: 871 | End: 2021-06-12
Attending: EMERGENCY MEDICINE | Admitting: INTERNAL MEDICINE
Payer: COMMERCIAL

## 2021-06-09 ENCOUNTER — APPOINTMENT (OUTPATIENT)
Dept: ULTRASOUND IMAGING | Facility: CLINIC | Age: 56
DRG: 871 | End: 2021-06-09
Attending: EMERGENCY MEDICINE
Payer: COMMERCIAL

## 2021-06-09 ENCOUNTER — APPOINTMENT (OUTPATIENT)
Dept: GENERAL RADIOLOGY | Facility: CLINIC | Age: 56
DRG: 871 | End: 2021-06-09
Attending: EMERGENCY MEDICINE
Payer: COMMERCIAL

## 2021-06-09 ENCOUNTER — TELEPHONE (OUTPATIENT)
Dept: TRANSPLANT | Facility: CLINIC | Age: 56
End: 2021-06-09

## 2021-06-09 ENCOUNTER — DOCUMENTATION ONLY (OUTPATIENT)
Dept: INFECTIOUS DISEASES | Facility: CLINIC | Age: 56
End: 2021-06-09

## 2021-06-09 ENCOUNTER — HOME INFUSION (PRE-WILLOW HOME INFUSION) (OUTPATIENT)
Dept: PHARMACY | Facility: CLINIC | Age: 56
End: 2021-06-09

## 2021-06-09 DIAGNOSIS — K83.01 PSC (PRIMARY SCLEROSING CHOLANGITIS) (H): ICD-10-CM

## 2021-06-09 DIAGNOSIS — R50.9 FEVER AND CHILLS: ICD-10-CM

## 2021-06-09 DIAGNOSIS — R80.9 PROTEINURIA: Primary | ICD-10-CM

## 2021-06-09 DIAGNOSIS — K74.60 CIRRHOSIS OF LIVER (H): ICD-10-CM

## 2021-06-09 DIAGNOSIS — K83.09 CHOLANGITIS (H): Primary | ICD-10-CM

## 2021-06-09 DIAGNOSIS — Z20.822 COVID-19 RULED OUT BY LABORATORY TESTING: ICD-10-CM

## 2021-06-09 DIAGNOSIS — K75.0 LIVER ABSCESS: Primary | ICD-10-CM

## 2021-06-09 LAB
ALBUMIN SERPL-MCNC: 2.4 G/DL (ref 3.4–5)
ALP SERPL-CCNC: 288 U/L (ref 40–150)
ALT SERPL W P-5'-P-CCNC: 23 U/L (ref 0–50)
AMMONIA PLAS-SCNC: 48 UMOL/L (ref 10–50)
ANION GAP SERPL CALCULATED.3IONS-SCNC: 8 MMOL/L (ref 3–14)
APTT PPP: 32 SEC (ref 22–37)
AST SERPL W P-5'-P-CCNC: 57 U/L (ref 0–45)
BASOPHILS # BLD AUTO: 0.1 10E9/L (ref 0–0.2)
BASOPHILS NFR BLD AUTO: 0.3 %
BILIRUB SERPL-MCNC: 2.2 MG/DL (ref 0.2–1.3)
BUN SERPL-MCNC: 21 MG/DL (ref 7–30)
CALCIUM SERPL-MCNC: 8.9 MG/DL (ref 8.5–10.1)
CHLORIDE SERPL-SCNC: 100 MMOL/L (ref 94–109)
CO2 SERPL-SCNC: 18 MMOL/L (ref 20–32)
CREAT SERPL-MCNC: 0.64 MG/DL (ref 0.52–1.04)
DIFFERENTIAL METHOD BLD: ABNORMAL
EOSINOPHIL # BLD AUTO: 0 10E9/L (ref 0–0.7)
EOSINOPHIL NFR BLD AUTO: 0.1 %
ERYTHROCYTE [DISTWIDTH] IN BLOOD BY AUTOMATED COUNT: 53.6 % (ref 10–15)
GFR SERPL CREATININE-BSD FRML MDRD: >90 ML/MIN/{1.73_M2}
GLUCOSE SERPL-MCNC: 102 MG/DL (ref 70–99)
HCT VFR BLD AUTO: 36.6 % (ref 35–47)
HGB BLD-MCNC: 11.6 G/DL (ref 11.7–15.7)
IMM GRANULOCYTES # BLD: 0.2 10E9/L (ref 0–0.4)
IMM GRANULOCYTES NFR BLD: 0.7 %
INR PPP: 1.46 (ref 0.86–1.14)
INR PPP: 1.65 (ref 0.86–1.14)
LABORATORY COMMENT REPORT: NORMAL
LACTATE BLD-SCNC: 1.8 MMOL/L (ref 0.7–2)
LIPASE SERPL-CCNC: 203 U/L (ref 73–393)
LYMPHOCYTES # BLD AUTO: 0.6 10E9/L (ref 0.8–5.3)
LYMPHOCYTES NFR BLD AUTO: 2 %
MCH RBC QN AUTO: 30.3 PG (ref 26.5–33)
MCHC RBC AUTO-ENTMCNC: 31.7 G/DL (ref 31.5–36.5)
MCV RBC AUTO: 96 FL (ref 78–100)
MONOCYTES # BLD AUTO: 0.8 10E9/L (ref 0–1.3)
MONOCYTES NFR BLD AUTO: 2.8 %
NEUTROPHILS # BLD AUTO: 25.9 10E9/L (ref 1.6–8.3)
NEUTROPHILS NFR BLD AUTO: 94.1 %
NRBC # BLD AUTO: 0 10*3/UL
NRBC BLD AUTO-RTO: 0 /100
PLATELET # BLD AUTO: 431 10E9/L (ref 150–450)
POTASSIUM SERPL-SCNC: 4.7 MMOL/L (ref 3.4–5.3)
PROCALCITONIN SERPL-MCNC: 0.74 NG/ML
PROT SERPL-MCNC: 7.2 G/DL (ref 6.8–8.8)
RBC # BLD AUTO: 3.83 10E12/L (ref 3.8–5.2)
SARS-COV-2 RNA RESP QL NAA+PROBE: NEGATIVE
SODIUM SERPL-SCNC: 126 MMOL/L (ref 133–144)
SPECIMEN SOURCE: NORMAL
WBC # BLD AUTO: 27.5 10E9/L (ref 4–11)

## 2021-06-09 PROCEDURE — 85730 THROMBOPLASTIN TIME PARTIAL: CPT | Performed by: PHYSICIAN ASSISTANT

## 2021-06-09 PROCEDURE — C9803 HOPD COVID-19 SPEC COLLECT: HCPCS | Performed by: EMERGENCY MEDICINE

## 2021-06-09 PROCEDURE — 85025 COMPLETE CBC W/AUTO DIFF WBC: CPT | Performed by: EMERGENCY MEDICINE

## 2021-06-09 PROCEDURE — 96365 THER/PROPH/DIAG IV INF INIT: CPT | Performed by: EMERGENCY MEDICINE

## 2021-06-09 PROCEDURE — 250N000011 HC RX IP 250 OP 636: Performed by: EMERGENCY MEDICINE

## 2021-06-09 PROCEDURE — 99285 EMERGENCY DEPT VISIT HI MDM: CPT | Performed by: EMERGENCY MEDICINE

## 2021-06-09 PROCEDURE — 87040 BLOOD CULTURE FOR BACTERIA: CPT | Performed by: EMERGENCY MEDICINE

## 2021-06-09 PROCEDURE — 250N000012 HC RX MED GY IP 250 OP 636 PS 637: Performed by: PHYSICIAN ASSISTANT

## 2021-06-09 PROCEDURE — 76705 ECHO EXAM OF ABDOMEN: CPT

## 2021-06-09 PROCEDURE — 80053 COMPREHEN METABOLIC PANEL: CPT | Performed by: EMERGENCY MEDICINE

## 2021-06-09 PROCEDURE — U0003 INFECTIOUS AGENT DETECTION BY NUCLEIC ACID (DNA OR RNA); SEVERE ACUTE RESPIRATORY SYNDROME CORONAVIRUS 2 (SARS-COV-2) (CORONAVIRUS DISEASE [COVID-19]), AMPLIFIED PROBE TECHNIQUE, MAKING USE OF HIGH THROUGHPUT TECHNOLOGIES AS DESCRIBED BY CMS-2020-01-R: HCPCS | Performed by: EMERGENCY MEDICINE

## 2021-06-09 PROCEDURE — 258N000003 HC RX IP 258 OP 636: Performed by: EMERGENCY MEDICINE

## 2021-06-09 PROCEDURE — 85610 PROTHROMBIN TIME: CPT | Performed by: PHYSICIAN ASSISTANT

## 2021-06-09 PROCEDURE — 82140 ASSAY OF AMMONIA: CPT | Performed by: EMERGENCY MEDICINE

## 2021-06-09 PROCEDURE — 83690 ASSAY OF LIPASE: CPT | Performed by: EMERGENCY MEDICINE

## 2021-06-09 PROCEDURE — 71045 X-RAY EXAM CHEST 1 VIEW: CPT | Mod: 26 | Performed by: RADIOLOGY

## 2021-06-09 PROCEDURE — 84145 PROCALCITONIN (PCT): CPT | Performed by: EMERGENCY MEDICINE

## 2021-06-09 PROCEDURE — U0005 INFEC AGEN DETEC AMPLI PROBE: HCPCS | Performed by: EMERGENCY MEDICINE

## 2021-06-09 PROCEDURE — 96368 THER/DIAG CONCURRENT INF: CPT | Performed by: EMERGENCY MEDICINE

## 2021-06-09 PROCEDURE — 85610 PROTHROMBIN TIME: CPT | Performed by: EMERGENCY MEDICINE

## 2021-06-09 PROCEDURE — 36415 COLL VENOUS BLD VENIPUNCTURE: CPT | Performed by: PHYSICIAN ASSISTANT

## 2021-06-09 PROCEDURE — 99223 1ST HOSP IP/OBS HIGH 75: CPT | Mod: AI | Performed by: INTERNAL MEDICINE

## 2021-06-09 PROCEDURE — 83605 ASSAY OF LACTIC ACID: CPT | Performed by: EMERGENCY MEDICINE

## 2021-06-09 PROCEDURE — 99207 PR APP CREDIT; MD BILLING SHARED VISIT: CPT | Performed by: PHYSICIAN ASSISTANT

## 2021-06-09 PROCEDURE — 76705 ECHO EXAM OF ABDOMEN: CPT | Mod: 26 | Performed by: RADIOLOGY

## 2021-06-09 PROCEDURE — 99285 EMERGENCY DEPT VISIT HI MDM: CPT | Mod: 25 | Performed by: EMERGENCY MEDICINE

## 2021-06-09 PROCEDURE — 71045 X-RAY EXAM CHEST 1 VIEW: CPT

## 2021-06-09 PROCEDURE — 120N000002 HC R&B MED SURG/OB UMMC

## 2021-06-09 PROCEDURE — 96366 THER/PROPH/DIAG IV INF ADDON: CPT | Performed by: EMERGENCY MEDICINE

## 2021-06-09 RX ORDER — PIPERACILLIN SODIUM, TAZOBACTAM SODIUM 4; .5 G/20ML; G/20ML
4.5 INJECTION, POWDER, LYOPHILIZED, FOR SOLUTION INTRAVENOUS ONCE
Status: COMPLETED | OUTPATIENT
Start: 2021-06-09 | End: 2021-06-09

## 2021-06-09 RX ORDER — URSODIOL 300 MG/1
300 CAPSULE ORAL 2 TIMES DAILY
Status: DISCONTINUED | OUTPATIENT
Start: 2021-06-10 | End: 2021-06-12 | Stop reason: HOSPADM

## 2021-06-09 RX ORDER — MULTIVITAMIN,THERAPEUTIC
TABLET ORAL DAILY
Status: DISCONTINUED | OUTPATIENT
Start: 2021-06-10 | End: 2021-06-12 | Stop reason: HOSPADM

## 2021-06-09 RX ORDER — LISINOPRIL 10 MG/1
10 TABLET ORAL DAILY
COMMUNITY
Start: 2021-03-31 | End: 2021-08-16

## 2021-06-09 RX ORDER — MESALAMINE 1.2 G/1
2400 TABLET, DELAYED RELEASE ORAL
Status: DISCONTINUED | OUTPATIENT
Start: 2021-06-10 | End: 2021-06-12 | Stop reason: HOSPADM

## 2021-06-09 RX ORDER — LIDOCAINE 40 MG/G
CREAM TOPICAL
Status: DISCONTINUED | OUTPATIENT
Start: 2021-06-09 | End: 2021-06-10

## 2021-06-09 RX ORDER — DIPHENHYDRAMINE HCL 50 MG
50 CAPSULE ORAL ONCE
Status: COMPLETED | OUTPATIENT
Start: 2021-06-10 | End: 2021-06-10

## 2021-06-09 RX ORDER — ONDANSETRON 4 MG/1
4 TABLET, ORALLY DISINTEGRATING ORAL EVERY 6 HOURS PRN
Status: DISCONTINUED | OUTPATIENT
Start: 2021-06-09 | End: 2021-06-12 | Stop reason: HOSPADM

## 2021-06-09 RX ORDER — NADOLOL 20 MG/1
20 TABLET ORAL DAILY
COMMUNITY
Start: 2021-03-23 | End: 2021-06-21

## 2021-06-09 RX ORDER — PIPERACILLIN SODIUM, TAZOBACTAM SODIUM 3; .375 G/15ML; G/15ML
3.38 INJECTION, POWDER, LYOPHILIZED, FOR SOLUTION INTRAVENOUS EVERY 6 HOURS
Status: DISCONTINUED | OUTPATIENT
Start: 2021-06-10 | End: 2021-06-12

## 2021-06-09 RX ORDER — SIMVASTATIN 20 MG
20 TABLET ORAL AT BEDTIME
Status: DISCONTINUED | OUTPATIENT
Start: 2021-06-10 | End: 2021-06-12 | Stop reason: HOSPADM

## 2021-06-09 RX ORDER — METHYLPREDNISOLONE 32 MG/1
32 TABLET ORAL ONCE
Status: COMPLETED | OUTPATIENT
Start: 2021-06-10 | End: 2021-06-10

## 2021-06-09 RX ORDER — METHYLPREDNISOLONE 32 MG/1
32 TABLET ORAL ONCE
Status: COMPLETED | OUTPATIENT
Start: 2021-06-09 | End: 2021-06-09

## 2021-06-09 RX ORDER — SODIUM CHLORIDE 9 MG/ML
INJECTION, SOLUTION INTRAVENOUS CONTINUOUS
Status: DISCONTINUED | OUTPATIENT
Start: 2021-06-09 | End: 2021-06-09

## 2021-06-09 RX ORDER — CARVEDILOL 6.25 MG/1
6.25 TABLET ORAL 2 TIMES DAILY WITH MEALS
Status: CANCELLED | OUTPATIENT
Start: 2021-06-09

## 2021-06-09 RX ORDER — ONDANSETRON 2 MG/ML
4 INJECTION INTRAMUSCULAR; INTRAVENOUS EVERY 6 HOURS PRN
Status: DISCONTINUED | OUTPATIENT
Start: 2021-06-09 | End: 2021-06-12 | Stop reason: HOSPADM

## 2021-06-09 RX ADMIN — SODIUM CHLORIDE 1000 ML: 9 INJECTION, SOLUTION INTRAVENOUS at 18:17

## 2021-06-09 RX ADMIN — VANCOMYCIN HYDROCHLORIDE 1250 MG: 10 INJECTION, POWDER, LYOPHILIZED, FOR SOLUTION INTRAVENOUS at 17:43

## 2021-06-09 RX ADMIN — PIPERACILLIN SODIUM AND TAZOBACTAM SODIUM 4.5 G: 4; .5 INJECTION, POWDER, LYOPHILIZED, FOR SOLUTION INTRAVENOUS at 16:39

## 2021-06-09 RX ADMIN — METHYLPREDNISOLONE 32 MG: 32 TABLET ORAL at 19:12

## 2021-06-09 RX ADMIN — SODIUM CHLORIDE 1000 ML: 9 INJECTION, SOLUTION INTRAVENOUS at 16:38

## 2021-06-09 ASSESSMENT — ENCOUNTER SYMPTOMS
DYSURIA: 0
FEVER: 1
SHORTNESS OF BREATH: 0
NAUSEA: 0
CONSTIPATION: 0
BLOOD IN STOOL: 0
VOMITING: 0
CHILLS: 1
ABDOMINAL DISTENTION: 1
COUGH: 1
DIARRHEA: 0

## 2021-06-09 NOTE — ED TRIAGE NOTES
Coming in with fever 101.1and chills today. Also, has to cough. Yesterday WBC 14. Directed to ED for further evaluation.

## 2021-06-09 NOTE — ED PROVIDER NOTES
[place] EMERGENCY DEPARTMENT ([facility])  6/09/21    History     Chief Complaint   Patient presents with     Fever     The history is provided by the patient and medical records.     Berta Nava is a 55 year old female with a history of primary sclerosing cholangitis with cirrhosis c/b ascites and esophageal varices, ulcerative colitis, biliary strictures s/p stenting (1/6/2021), pancreatic mucinous cystadenoma, Sjogren's, HTN, and HLD who presents to the Emergency Department with fever.  Patient reports fever 100.1 at home this afternoon along with shaking chills.  She is currently on IV Flagyl and ceftriaxone and states she has been taking these.  Patient denies recent change in antibiotics.  Patient denies diarrhea.  She states while she was admitted she was constipated but is now having several small, formed stools per day.  No blood in stool.  Patient reports some abdominal discomfort due to abdominal distention but denies abdominal pain.  She states she has a paracentesis scheduled for every Friday.  Patient denies nausea or vomiting.  She endorses cough that developed today.  Patient denies shortness of breath or chest pain.  She notes home health had difficulty drawing blood yesterday and today from her PICC line.  She denies pain or swelling in the area.  Patient is vaccinated against Covid, received her second dose on 4/15 per [facility].    Per chart review, patient was recently admitted to [facility] on 4/29 with septic shock due to S. Mitis bacteremia and SBP.  Patient was stabilized with fluids and antibiotics and transferred to [facility].  She underwent ERCP on 5/5 with one stent removed, one stent placed, and large amounts of scant pus removed suggestive of cholangitis.  She was also noted to have possible ~2 cm abscess/collection of the left intrahepatic that could not be accessed.  ID was consulted and recommended repeat RUQ ultrasound which showed some decrease in collections.  Repeat  ERCP was performed on 5/10 in hopes of facilitating internal drainage but this was not achieved and instead biliary stent was exchanged and persistent stones and pus were also removed.  Patient continued on ceftriaxone and Flagyl with plan to continue until at least time of next ERCP scheduled for 6/25.    Past Medical History  Past Medical History:   Diagnosis Date     Ascites      Biliary cirrhosis (H)      Cholangitis, sclerosing      Cirrhosis of liver with ascites (H) 3/3/2021     Hearing loss of left ear     wears a hearing aide     History of low potassium      Hyperlipidemia      Hypertension      SBP (spontaneous bacterial peritonitis) (H) 4/30/2021     Sjogren's syndrome (H)      Ulcerative colitis (H)      Ulcerative pancolitis (H)      Past Surgical History:   Procedure Laterality Date     ENDOSCOPIC RETROGRADE CHOLANGIOPANCREATOGRAPHY, EXCHANGE TUBE/STENT N/A 05/05/2021    Procedure: ENDOSCOPIC RETROGRADE CHOLANGIOPANCREATOGRAPHY WITH STENT EXCHANGE, STONE EXTRACTION, AND DILATION;  Surgeon: Gregory Gabriel MD;  Location: [facility] OR     ENDOSCOPIC RETROGRADE CHOLANGIOPANCREATOGRAPHY, EXCHANGE TUBE/STENT N/A 5/10/2021    Procedure: ENDOSCOPIC RETROGRADE CHOLANGIOPANCREATOGRAPHY with biliary dilation, stone removal, stent exchange;  Surgeon: Gregory Gabriel MD;  Location: [facility] OR     GYN SURGERY      bilat fallopian tubes and ovaries removed     PICC DOUBLE LUMEN PLACEMENT Right 05/08/2021    42cm (2cm external), Lateral brachial vein     calcium carbonate 600 mg-vitamin D 400 units (CALTRATE) 600-400 MG-UNIT per tablet  carvedilol (COREG) 6.25 MG tablet  cefTRIAXone (ROCEPHIN) 2 GM vial  furosemide (LASIX) 20 MG tablet  mesalamine (LIALDA) 1.2 g EC tablet  metroNIDAZOLE (FLAGYL) 500 MG tablet  multivitamin (CENTRUM SILVER) tablet  potassium chloride ER (MICRO-K) 10 MEQ CR capsule  simvastatin (ZOCOR) 20 MG tablet  spironolactone (ALDACTONE) 50 MG tablet  ursodiol (ACTIGALL) 300 MG capsule      Allergies  "  Allergen Reactions     Diagnostic X-Ray Materials Hives     PATIENT HAD HIVE REACTION AFTER ADMINISTERING CT CONTRAST DYE.       Contrast Dye      Family History  No family history on file.  Social History   Social History     Tobacco Use     Smoking status: Never Smoker     Smokeless tobacco: Never Used   Substance Use Topics     Alcohol use: No     Comment: Last drink was 2017     Drug use: No      Past medical history, past surgical history, medications, allergies, family history, and social history were reviewed with the patient. No additional pertinent items.       Review of Systems   Constitutional: Positive for chills and fever.   Respiratory: Positive for cough. Negative for shortness of breath.    Cardiovascular: Negative for chest pain.   Gastrointestinal: Positive for abdominal distention. Negative for blood in stool, constipation, diarrhea, nausea and vomiting.   Genitourinary: Negative for dysuria.   All other systems reviewed and are negative.    A complete review of systems was performed with pertinent positives and negatives noted in the HPI, and all other systems negative.    Physical Exam   BP: 110/72  Pulse: 129  Temp: 100.3  F (37.9  C)  Resp: 20  Height: 165.1 cm (5' 5\")  SpO2: 98 %  Physical Exam  Vitals signs and nursing note reviewed.   Constitutional:       General: She is not in acute distress.     Appearance: She is well-developed. She is not toxic-appearing or diaphoretic.   HENT:      Head: Normocephalic and atraumatic.      Nose: Nose normal.   Eyes:      General: Scleral icterus present.      Conjunctiva/sclera: Conjunctivae normal.   Neck:      Musculoskeletal: Normal range of motion and neck supple. No neck rigidity.   Cardiovascular:      Rate and Rhythm: Normal rate.   Pulmonary:      Effort: Pulmonary effort is normal. No respiratory distress.      Breath sounds: No stridor.   Abdominal:      General: Abdomen is protuberant.      Palpations: Abdomen is soft. There is fluid wave. " There is no mass.      Tenderness: There is no abdominal tenderness. There is no guarding or rebound. Negative signs include Javier's sign.   Musculoskeletal: Normal range of motion.         General: No deformity or signs of injury.   Skin:     General: Skin is warm and dry.      Coloration: Skin is jaundiced. Skin is not pale.      Findings: No rash.   Neurological:      General: No focal deficit present.      Mental Status: She is alert and oriented to person, place, and time.   Psychiatric:         Mood and Affect: Mood normal.         Behavior: Behavior normal.         Thought Content: Thought content normal.         ED Course   3:40 PM  The patient was seen and examined by Lindsay Youngblood MD in Room ED11.      Procedures               Results for orders placed or performed during the hospital encounter of 06/09/21   XR Chest Port 1 View     Status: None    Narrative    EXAM: XR CHEST PORT 1 VIEW  6/9/2021 4:11 PM      HISTORY: cough, fever    COMPARISON: X-ray 4/29/2021    FINDINGS: Single view of the chest. Right PICC tip projects over the  right atrium. No pneumothorax. No pleural effusion. Borderline  enlarged cardiac silhouette. No acute airspace opacities. Partial  visualization of ERCP tubing, better seen on an imaging from  5/10/2021. No aggressive osseous lesions.      Impression    IMPRESSION:  No acute airspace opacities.    I have personally reviewed the examination and initial interpretation  and I agree with the findings.    ASHLY AYERS MD   Comprehensive metabolic panel     Status: Abnormal   Result Value Ref Range    Sodium 126 (L) 133 - 144 mmol/L    Potassium 4.7 3.4 - 5.3 mmol/L    Chloride 100 94 - 109 mmol/L    Carbon Dioxide 18 (L) 20 - 32 mmol/L    Anion Gap 8 3 - 14 mmol/L    Glucose 102 (H) 70 - 99 mg/dL    Urea Nitrogen 21 7 - 30 mg/dL    Creatinine 0.64 0.52 - 1.04 mg/dL    GFR Estimate >90 >60 mL/min/[1.73_m2]    GFR Estimate If Black >90 >60 mL/min/[1.73_m2]    Calcium 8.9 8.5 -  10.1 mg/dL    Bilirubin Total 2.2 (H) 0.2 - 1.3 mg/dL    Albumin 2.4 (L) 3.4 - 5.0 g/dL    Protein Total 7.2 6.8 - 8.8 g/dL    Alkaline Phosphatase 288 (H) 40 - 150 U/L    ALT 23 0 - 50 U/L    AST 57 (H) 0 - 45 U/L   CBC with platelets differential     Status: Abnormal   Result Value Ref Range    WBC 27.5 (H) 4.0 - 11.0 10e9/L    RBC Count 3.83 3.8 - 5.2 10e12/L    Hemoglobin 11.6 (L) 11.7 - 15.7 g/dL    Hematocrit 36.6 35.0 - 47.0 %    MCV 96 78 - 100 fl    MCH 30.3 26.5 - 33.0 pg    MCHC 31.7 31.5 - 36.5 g/dL    RDW 53.6 (H) 10.0 - 15.0 %    Platelet Count 431 150 - 450 10e9/L    Diff Method Automated Method     % Neutrophils 94.1 %    % Lymphocytes 2.0 %    % Monocytes 2.8 %    % Eosinophils 0.1 %    % Basophils 0.3 %    % Immature Granulocytes 0.7 %    Nucleated RBCs 0 0 /100    Absolute Neutrophil 25.9 (H) 1.6 - 8.3 10e9/L    Absolute Lymphocytes 0.6 (L) 0.8 - 5.3 10e9/L    Absolute Monocytes 0.8 0.0 - 1.3 10e9/L    Absolute Eosinophils 0.0 0.0 - 0.7 10e9/L    Absolute Basophils 0.1 0.0 - 0.2 10e9/L    Abs Immature Granulocytes 0.2 0 - 0.4 10e9/L    Absolute Nucleated RBC 0.0    Lactate for Sepsis Protocol     Status: None   Result Value Ref Range    Lactate for Sepsis Protocol 1.8 0.7 - 2.0 mmol/L   Ammonia     Status: None   Result Value Ref Range    Ammonia 48 10 - 50 umol/L   Procalcitonin     Status: None   Result Value Ref Range    Procalcitonin 0.74 ng/ml   Lipase     Status: None   Result Value Ref Range    Lipase 203 73 - 393 U/L     Medications   sodium chloride (PF) 0.9% PF flush 3 mL (has no administration in time range)   sodium chloride (PF) 0.9% PF flush 3 mL (has no administration in time range)   0.9% sodium chloride BOLUS (has no administration in time range)   0.9% sodium chloride BOLUS (1,000 mLs Intravenous New Bag 6/9/21 2942)     Followed by   sodium chloride 0.9% infusion (has no administration in time range)   vancomycin 1250 mg in 0.9% NaCl 250 mL intermittent infusion 1,250 mg (has no  administration in time range)   vancomycin (VANCOCIN) 750 mg in sodium chloride 0.9 % 250 mL intermittent infusion (has no administration in time range)   piperacillin-tazobactam (ZOSYN) 4.5 g vial to attach to  mL bag (4.5 g Intravenous New Bag 6/9/21 1639)        Assessments & Plan (with Medical Decision Making)   Berta Nava is a 55 year old female with a history of primary sclerosing cholangitis with cirrhosis c/b ascites and esophageal varices, ulcerative colitis, biliary strictures s/p stenting (1/6/2021), pancreatic mucinous cystadenoma, Sjogren's, HTN, and HLD who presents to the Emergency Department with fever.      Ddx: biliary stent obstruction, recurrent ascending cholangitis, bacteremia, PICC line infection, PNA, Viral URI, UTI    Patient nontoxic appearing. High grade fever started today. No localizing sxs accept a new dry cough. No cp or sob. No abd/n/v/d/bloody stools. Nontender abd. So doubt SBP or intraperitoneal abscess. PICC line had trouble drawing today but nontender and no erythema or swelling. Will draw culture from PICC line. Currently on ceftriaxone and flagyl. Will broaden to vanc and zosyn. Labs notable for elevated bili to 2.2 from 1.5 yesterday. Will obtain RUQ US and consult panc/bili. Admit to medicine. WBC 27.5. Hgb stable. No sxs of c diff colitis. Getting CXR and COVID. Currently appropriate for general care with stable vital signs.       I have reviewed the nursing notes. I have reviewed the findings, diagnosis, plan and need for follow up with the patient.    New Prescriptions    No medications on file       Final diagnoses:   Fever and chills     I, Blanca Rubin, am serving as a trained medical scribe to document services personally performed by Lindsay Youngblood MD, based on the provider's statements to me.      I, Lindsay Youngblood MD, was physically present and have reviewed and verified the accuracy of this note documented by Blanca Rubin.    --  MD AYE Calloway  [facility] EMERGENCY DEPARTMENT  6/9/2021     Lindsay Youngblood MD  06/09/21 2141

## 2021-06-09 NOTE — H&P
"[facility]    History and Physical - Hospitalist Service, Gold Cross Cover       Date of Admission:  6/9/2021    Assessment & Plan   Berta Nava is a 55 year old female with a past medical history of primary sclerosing cholangitis c/b cirrhosis, ascites and esophageal varices, biliary strictures s/p stenting (1/6/2021), pancreatic mucinous cystadenoma, ulcerative colitis, Sjogren's, HTN, and HLD who presents to the ED with fever and chills with concerns for cholangitis.  Patient is admitted to internal medicine for further treatment and monitoring.     Sepsis 2/2 Suspected Acute Cholangitis  Hx of Strep mitis Bacteremia  Hx of Hepatic Abscess - Patient presents with acute fevers and chills that occurred suddenly this afternoon.  Patient with recent admission 4/29 - 5/11 for septic shock from strep mitis bacteremia and SBP. During ERCP (5/5), was found to have purulence concerning for cholangitis.  She was also noted to have a possible ~2cm abscess/fluid collection off the left intrahepatic which was unable to be accessed. She was started on ceftriaxone and flagyl until the time of her next ERCP (tentative plan was 4 weeks after hospital discharge). She has been closely following with ID and has been compliant with her antibiotic regimen. In setting of fevers and past cholangitis, concerns for repeat acute cholangitis. S/p doses PTD vancomycin and Zosyn in ED. Abdominal ultrasound with \"Largely distended gallbladder with biliary sludge and mild nonspecific wall thickening in the setting of liver disease and ascites\"  -GI Panc/bili consulted, appreciate recommendations   -Obtain CT abdomen pelvis w/ contrast (pre-medicating for contrast allergy per 12 hour protocol)   -NPO at midnight for ERCP tomorrow   -Continue PTD vancomycin and Zosyn   -Follow blood and urine cultures        Primary Sclerosing Cholangitis c/b Cirrhosis, Ascites, and Grade III Esophageal Varices "   S/p ERCP (5/5, 5/10) with Biliary Stent Exchanged x2 and Debris Clearance - Patient currently undergoing transplant work up. She is scheduled for an outpatient paracentesis 6/11 for her recurrent ascites with plans to follow up with the hepatology clinic 6/21.   -Consult to [facility] 6/11 for therapeutic paracentesis   -Hold carvedilol for above sepsis   -Plans for ciprofloxacin for SBP prophylaxis once patient completes treatment for hepatic abscess.     Hyponatremia - Sodium 126, baseline WNLs. Suspect hypervolemia from cirrhosis and ascites.  S/p 2Ls NS in ED.   -Hold off on further IV fluid  -Hold spironolactone and Lasix  -Repeat BMP in AM     Ulcerative Colitis - Continue mesalamine while inpatient.     HLD - Continue simvastatin 20mg nightly          Diet:  NPO at midnight  DVT Prophylaxis: Pneumatic Compression Devices  Jurado Catheter: not present  Code Status:   Full Code Status         Disposition Plan   Expected discharge: 2 - 3 days, recommended to prior living arrangement once antibiotic plan established.  Entered: Honey Franco PA-C 06/09/2021, 5:23 PM     The patient's care was discussed with the Attending Physician, Dr. Pati Solares.    Honey Franco PA-C  [facility]  Contact information available via [facility] Paging/Directory  Please see sign in/sign out for up to date coverage information    ______________________________________________________________________    Chief Complaint   Fever, chills    History is obtained from the patient    History of Present Illness   Berta Nava is a 55 year old female with a PMH as listed above who presents to the ED with complaints of sudden fevers and chills. Patient states her ID physician has been closely following her white count in the outpatient setting.  She endorses compliance with her antibiotic plan.  She recently had the crown on her tooth fix in order to prevent future strep infections. She states  she has been feeling good up until 3 hours prior to arrival in the ED. She suddenly developed fevers and shaking chills.  She also notes a worsening dry cough over the past couple days.  She denies chest pain, palpitations, SOB, nausea, vomiting, change in bowel or urinary habits.     Review of Systems    The 10 point Review of Systems is negative other than noted in the HPI or here.     Past Medical History    I have reviewed this patient's medical history and updated it with pertinent information if needed.   Past Medical History:   Diagnosis Date     Ascites      Biliary cirrhosis (H)      Cholangitis, sclerosing      Cirrhosis of liver with ascites (H) 3/3/2021     Hearing loss of left ear     wears a hearing aide     History of low potassium      Hyperlipidemia      Hypertension      SBP (spontaneous bacterial peritonitis) (H) 4/30/2021     Sjogren's syndrome (H)      Ulcerative colitis (H)      Ulcerative pancolitis (H)        Past Surgical History   I have reviewed this patient's surgical history and updated it with pertinent information if needed.  Past Surgical History:   Procedure Laterality Date     ENDOSCOPIC RETROGRADE CHOLANGIOPANCREATOGRAPHY, EXCHANGE TUBE/STENT N/A 05/05/2021    Procedure: ENDOSCOPIC RETROGRADE CHOLANGIOPANCREATOGRAPHY WITH STENT EXCHANGE, STONE EXTRACTION, AND DILATION;  Surgeon: Gregory Gabriel MD;  Location: [facility] OR     ENDOSCOPIC RETROGRADE CHOLANGIOPANCREATOGRAPHY, EXCHANGE TUBE/STENT N/A 5/10/2021    Procedure: ENDOSCOPIC RETROGRADE CHOLANGIOPANCREATOGRAPHY with biliary dilation, stone removal, stent exchange;  Surgeon: Gregory Gabriel MD;  Location: [facility] OR     GYN SURGERY      bilat fallopian tubes and ovaries removed     PICC DOUBLE LUMEN PLACEMENT Right 05/08/2021    42cm (2cm external), Lateral brachial vein       Social History   I have reviewed this patient's social history and updated it with pertinent information if needed.  Social History     Tobacco Use     Smoking status:  Never Smoker     Smokeless tobacco: Never Used   Substance Use Topics     Alcohol use: No     Comment: Last drink was 2017     Drug use: No       Family History     No significant family history    Prior to Admission Medications   Prior to Admission Medications   Prescriptions Last Dose Informant Patient Reported? Taking?   calcium carbonate 600 mg-vitamin D 400 units (CALTRATE) 600-400 MG-UNIT per tablet   Yes Yes   Sig: Take 1 tablet by mouth daily   carvedilol (COREG) 6.25 MG tablet   No Yes   Sig: Take 1 tablet (6.25 mg) by mouth 2 times daily (with meals)   cefTRIAXone (ROCEPHIN) 2 GM vial   No Yes   Sig: Inject 2 g into the vein every 24 hours   furosemide (LASIX) 20 MG tablet   Yes Yes   Sig: Take 20 mg by mouth daily   mesalamine (LIALDA) 1.2 g EC tablet   Yes Yes   Sig: Take 2,400 mg by mouth daily (with breakfast)   metroNIDAZOLE (FLAGYL) 500 MG tablet   No Yes   Sig: Take 1 tablet (500 mg) by mouth 3 times daily   multivitamin (CENTRUM SILVER) tablet   Yes Yes   Sig: Take 1 tablet by mouth daily   potassium chloride ER (MICRO-K) 10 MEQ CR capsule   Yes Yes   Sig: Take 10 mEq by mouth At Bedtime   simvastatin (ZOCOR) 20 MG tablet   Yes Yes   Sig: Take 20 mg by mouth At Bedtime   spironolactone (ALDACTONE) 50 MG tablet   Yes Yes   Sig: Take 50 mg by mouth daily   ursodiol (ACTIGALL) 300 MG capsule   Yes Yes   Sig: Take 300 mg by mouth 2 times daily      Facility-Administered Medications: None     Allergies   Allergies   Allergen Reactions     Diagnostic X-Ray Materials Hives     PATIENT HAD HIVE REACTION AFTER ADMINISTERING CT CONTRAST DYE.       Contrast Dye        Physical Exam   Vital Signs: Temp: 100.3  F (37.9  C) Temp src: Oral BP: 105/72 Pulse: 101   Resp: 20 SpO2: 97 %      Weight: 0 lbs 0 oz    GENERAL: Alert and oriented x 3. NAD. Ambulatory. Cooperative.   HEENT: Anicteric sclera. Mucous membranes moist. NC. AT.   CV: RRR. S1, S2. No murmurs appreciated.   RESPIRATORY: Effort normal on RA.  Lungs  CTAB with no wheezing, rales, rhonchi.   GI: Abdomen soft and distended with normoactive bowel sounds present in all quadrants. No tenderness, rebound, guarding. No lesions.   NEUROLOGICAL: No focal deficits. Moves all extremities.  CN 2-12 grossly intact.  EXTREMITIES: No peripheral edema. Intact bilateral pedal pulses.   SKIN: No jaundice. No rashes.      Data   Data reviewed today: I reviewed all medications, new labs and imaging results over the last 24 hours.  Results for ESTUARDO JOYCE (MRN [id number]) as of 6/9/2021 18:23   Ref. Range 6/9/2021 15:27   Sodium Latest Ref Range: 133 - 144 mmol/L 126 (L)   Potassium Latest Ref Range: 3.4 - 5.3 mmol/L 4.7   Chloride Latest Ref Range: 94 - 109 mmol/L 100   Carbon Dioxide Latest Ref Range: 20 - 32 mmol/L 18 (L)   Urea Nitrogen Latest Ref Range: 7 - 30 mg/dL 21   Creatinine Latest Ref Range: 0.52 - 1.04 mg/dL 0.64   GFR Estimate Latest Ref Range: >60 mL/min/1.73_m2 >90   GFR Estimate If Black Latest Ref Range: >60 mL/min/1.73_m2 >90   Calcium Latest Ref Range: 8.5 - 10.1 mg/dL 8.9   Anion Gap Latest Ref Range: 3 - 14 mmol/L 8   Albumin Latest Ref Range: 3.4 - 5.0 g/dL 2.4 (L)   Protein Total Latest Ref Range: 6.8 - 8.8 g/dL 7.2   Bilirubin Total Latest Ref Range: 0.2 - 1.3 mg/dL 2.2 (H)   Alkaline Phosphatase Latest Ref Range: 40 - 150 U/L 288 (H)   ALT Latest Ref Range: 0 - 50 U/L 23   AST Latest Ref Range: 0 - 45 U/L 57 (H)   Results for ESTUARDO JOYCE (MRN [id number]) as of 6/9/2021 18:23   Ref. Range 6/9/2021 15:27   WBC Latest Ref Range: 4.0 - 11.0 10e9/L 27.5 (H)   Hemoglobin Latest Ref Range: 11.7 - 15.7 g/dL 11.6 (L)   Hematocrit Latest Ref Range: 35.0 - 47.0 % 36.6   Platelet Count Latest Ref Range: 150 - 450 10e9/L 431   RBC Count Latest Ref Range: 3.8 - 5.2 10e12/L 3.83   MCV Latest Ref Range: 78 - 100 fl 96   MCH Latest Ref Range: 26.5 - 33.0 pg 30.3   MCHC Latest Ref Range: 31.5 - 36.5 g/dL 31.7   RDW Latest Ref Range: 10.0 - 15.0 % 53.6 (H)   Diff  Method Unknown Automated Method   % Neutrophils Latest Units: % 94.1   % Lymphocytes Latest Units: % 2.0   % Monocytes Latest Units: % 2.8   % Eosinophils Latest Units: % 0.1   % Basophils Latest Units: % 0.3   % Immature Granulocytes Latest Units: % 0.7   Nucleated RBCs Latest Ref Range: 0 /100 0   Absolute Neutrophil Latest Ref Range: 1.6 - 8.3 10e9/L 25.9 (H)   Absolute Lymphocytes Latest Ref Range: 0.8 - 5.3 10e9/L 0.6 (L)   Absolute Monocytes Latest Ref Range: 0.0 - 1.3 10e9/L 0.8   Absolute Eosinophils Latest Ref Range: 0.0 - 0.7 10e9/L 0.0   Absolute Basophils Latest Ref Range: 0.0 - 0.2 10e9/L 0.1   Abs Immature Granulocytes Latest Ref Range: 0 - 0.4 10e9/L 0.2   Absolute Nucleated RBC Unknown 0.0

## 2021-06-09 NOTE — PHARMACY-VANCOMYCIN DOSING SERVICE
"Pharmacy Vancomycin Initial Note  Date of Service 2021  Patient's  1965  55 year old, female    Indication: Sepsis    Current estimated CrCl = Estimated Creatinine Clearance: 82.2 mL/min (based on SCr of 0.64 mg/dL).    Creatinine for last 3 days  2021:  4:02 PM Creatinine 0.62 mg/dL  2021:  3:27 PM Creatinine 0.64 mg/dL    Recent Vancomycin Level(s) for last 3 days  No results found for requested labs within last 72 hours.      Vancomycin IV Administrations (past 72 hours)      No vancomycin orders with administrations in past 72 hours.                Nephrotoxins and other renal medications (From now, onward)    Start     Dose/Rate Route Frequency Ordered Stop    06/10/21 0500  vancomycin (VANCOCIN) 750 mg in sodium chloride 0.9 % 250 mL intermittent infusion      750 mg  over 90 Minutes Intravenous EVERY 12 HOURS 21 1630      21 1635  vancomycin 1250 mg in 0.9% NaCl 250 mL intermittent infusion 1,250 mg      1,250 mg  over 90 Minutes Intravenous ONCE 21 1630      21 1610  piperacillin-tazobactam (ZOSYN) 4.5 g vial to attach to  mL bag     Note to Pharmacy: For [facility], [facility] and [facility]: For Zosyn-naive patients, use the \"Zosyn initial dose + extended infusion\" order panel.    4.5 g  over 30 Minutes Intravenous ONCE 21 1606            Contrast Orders - past 72 hours (72h ago, onward)    None          Loading dose: 1250 mg at 16:30 2021.  Regimen: 750 mg every 12 hours.  Start time: 16:30 on 2021  Exposure target: AUC24 (range)400-600 mg/L.hr   AUC24,ss: 449 mg/L.hr  PAUC*: 62 %  Ctrough,ss: 13.6 mg/L  Pconc*: 20 %  Tox.: 9 %        Plan:  1. Load with vancomycin 1250mg (23.3mg/kg using actual body wt = 53.7kg) followed by vancomycin  750 mg IV q12h (14mg/kg).   2. Vancomycin monitoring method: AUC  3. Vancomycin therapeutic monitoring goal: 400-600 mg*h/L  4. Pharmacy will check vancomycin levels as appropriate in 1-3 Days.    5. Serum creatinine " levels will be ordered daily for the first week of therapy and at least twice weekly for subsequent weeks.      Honey Reynolds, PharmD

## 2021-06-09 NOTE — TELEPHONE ENCOUNTER
I saw the note in the chart fromID earlier.    I did just speak with patient as she called and LVM for me to call her earlier this AM.    NEW SYMPTOMS: she just reported a minute ago that she has had chills for the last two hours, started after speaking with ID earlier, and now has a temp of 101. Instructed her to call ID now, but if she does not reach anyone, she should head into ER for evaluation.     She agreed with this plan.    Message sent to ID, GI, and home care teams         Below per message from Dr. Leventhal and home care willing to draw after discharge.    Lets get a 24 hour urine collection with 24h protein and 24 creatinine   Lets get an SPEP next lab draw (non urgent)

## 2021-06-09 NOTE — PROGRESS NOTES
This is a recent snapshot of the patient's [facility] medical record.  For current drug dose and complete information and questions, call [phone] or In Basket pool, [facility] home infusion ([id number])  CSN Number:  [id number]

## 2021-06-09 NOTE — TELEPHONE ENCOUNTER
Pt has some concerns re labs to review with Aaron for advice  WBC up to 14.3  Is on IV antibiotics since d/c

## 2021-06-09 NOTE — PROGRESS NOTES
Labs (6/8/21):    - WBC: 14.3; H/H: 11.2/34; Plat: 373  - Cr: 0.62, BUN: 24  - Alk phos: 285, ALT: 25, AST: 47, T bili: 1.5      Labs were noticeable for the elevation white count from 11 (last week) to 14 and CRP is also elevated from 16 to 23. Mrs. Nava is on ceftriaxone IV and metronidazole po for liver abscess. Duration will be at least until three days after her ERCP (6/28).    I called Mrs. Nava today and she informed me that she feels well and does not have any new symptoms. She denied fever, chills, night sweats, diarrhea, nausea, vomiting, urinary symptoms. She also denies skin rash, lymphadenopathy or any sign of drug reaction. Patient said that the PICC line site looks ok but the nurse was not able to draw blood from the PICC line. She stated that the nurse will try TPA flush.    Mrs. Hampton also informed me that she had weekly paracentesis (every Friday) for relief at [facility]. No ascitic fluid studies were performed since her dismissal and she had resolution of peritonitis per ascitic fluid studies prior to discharge. She denies increase in abdominal pain, but she states she has the abdominal discomfort when ascitic fluid accumulates.     Given elevation of white count and CRP, although the patient does not have any new symptoms and feels well, I will order blood cultures for completeness. I also believe it is reasonable to obtain studies from the ascitic fluid in the upcoming paracentesis to be performed this Friday (6/11/21). I will order the studies today (cellularity, differential, albumin, bacterial cultures and Gram stain). I also sent a message to Dr. Barrientos and Navarro Jimenez (provider and NP who performed the paracentesis last Friday) so they are aware of the plan for ascitic fluid studies in order to assure that the ascitic fluid is sent for the studies. Our nurse will also contact the IR team at [facility] to assure that the tests are performed.     We will follow blood culture and  ascitic fluid studies. If they are normal, I will follow the white count and CRP of next week and, if the wbc continues to trend up next week, I will discuss with GI the possibility of anticipating the ERCP. If it is not recommended by GI, I might repeat CT chest/abdomen next week if white count continues to trend up. I sent a message to GI team (Dr. Leventhal and Wilmer Lazar) about the plan above.    Noted also that the Na is 129, however it has been around 130 over the last couple of weeks and she does not have neurologic symptoms. GI team is managing and I will let them continue to manage the electrolytes.     I instructed the patient to proceed to ER if any new symptom.    ADDENDUM (2:26 PM).    I was informed by the GI/transplant nurse practitioner (Wilmer Lazar) that, about 2 hours after I spoke with the patient earlier today, she noted some chills and had a tem of 101 F.     Wilmer Lazar instructed the patient to proceed to the ER. I agree with the instruction.    I called the patient once again and she confirmed that she is on her way to the ER at [facility].

## 2021-06-10 ENCOUNTER — ANESTHESIA EVENT (OUTPATIENT)
Dept: SURGERY | Facility: CLINIC | Age: 56
DRG: 871 | End: 2021-06-10
Payer: COMMERCIAL

## 2021-06-10 ENCOUNTER — APPOINTMENT (OUTPATIENT)
Dept: GENERAL RADIOLOGY | Facility: CLINIC | Age: 56
DRG: 871 | End: 2021-06-10
Attending: PHYSICIAN ASSISTANT
Payer: COMMERCIAL

## 2021-06-10 ENCOUNTER — ANESTHESIA (OUTPATIENT)
Dept: SURGERY | Facility: CLINIC | Age: 56
DRG: 871 | End: 2021-06-10
Payer: COMMERCIAL

## 2021-06-10 ENCOUNTER — APPOINTMENT (OUTPATIENT)
Dept: CT IMAGING | Facility: CLINIC | Age: 56
DRG: 871 | End: 2021-06-10
Attending: PHYSICIAN ASSISTANT
Payer: COMMERCIAL

## 2021-06-10 ENCOUNTER — APPOINTMENT (OUTPATIENT)
Dept: GENERAL RADIOLOGY | Facility: CLINIC | Age: 56
DRG: 871 | End: 2021-06-10
Attending: INTERNAL MEDICINE
Payer: COMMERCIAL

## 2021-06-10 LAB
ALBUMIN FLD-MCNC: 0.4 G/DL
ALBUMIN SERPL-MCNC: 2.1 G/DL (ref 3.4–5)
ALBUMIN SERPL-MCNC: 2.5 G/DL (ref 3.4–5)
ALBUMIN UR-MCNC: 10 MG/DL
ALP SERPL-CCNC: 234 U/L (ref 40–150)
ALT SERPL W P-5'-P-CCNC: 20 U/L (ref 0–50)
ANION GAP SERPL CALCULATED.3IONS-SCNC: 8 MMOL/L (ref 3–14)
ANISOCYTOSIS BLD QL SMEAR: SLIGHT
APPEARANCE FLD: CLEAR
APPEARANCE UR: CLEAR
AST SERPL W P-5'-P-CCNC: 36 U/L (ref 0–45)
BACTERIA SPEC CULT: NORMAL
BASOPHILS # BLD AUTO: 0 10E9/L (ref 0–0.2)
BASOPHILS NFR BLD AUTO: 0 %
BILIRUB SERPL-MCNC: 1.7 MG/DL (ref 0.2–1.3)
BILIRUB UR QL STRIP: NEGATIVE
BUN SERPL-MCNC: 22 MG/DL (ref 7–30)
CALCIUM SERPL-MCNC: 8.4 MG/DL (ref 8.5–10.1)
CHLORIDE SERPL-SCNC: 105 MMOL/L (ref 94–109)
CO2 SERPL-SCNC: 17 MMOL/L (ref 20–32)
COLOR FLD: YELLOW
COLOR UR AUTO: YELLOW
CREAT SERPL-MCNC: 0.59 MG/DL (ref 0.52–1.04)
DIFFERENTIAL METHOD BLD: ABNORMAL
EOSINOPHIL # BLD AUTO: 0 10E9/L (ref 0–0.7)
EOSINOPHIL NFR BLD AUTO: 0 %
ERCP: NORMAL
ERYTHROCYTE [DISTWIDTH] IN BLOOD BY AUTOMATED COUNT: 15.4 % (ref 10–15)
GFR SERPL CREATININE-BSD FRML MDRD: >90 ML/MIN/{1.73_M2}
GLUCOSE FLD-MCNC: 118 MG/DL
GLUCOSE SERPL-MCNC: 114 MG/DL (ref 70–99)
GLUCOSE UR STRIP-MCNC: NEGATIVE MG/DL
GRAM STN SPEC: NORMAL
HCT VFR BLD AUTO: 30.6 % (ref 35–47)
HGB BLD-MCNC: 9.8 G/DL (ref 11.7–15.7)
HGB UR QL STRIP: NEGATIVE
HYALINE CASTS #/AREA URNS LPF: 1 /LPF (ref 0–2)
KETONES UR STRIP-MCNC: NEGATIVE MG/DL
LACTATE BLD-SCNC: 1 MMOL/L (ref 0.7–2)
LEUKOCYTE ESTERASE UR QL STRIP: NEGATIVE
LYMPHOCYTES # BLD AUTO: 0.8 10E9/L (ref 0.8–5.3)
LYMPHOCYTES NFR BLD AUTO: 3.5 %
LYMPHOCYTES NFR FLD MANUAL: 7 %
MCH RBC QN AUTO: 30.2 PG (ref 26.5–33)
MCHC RBC AUTO-ENTMCNC: 32 G/DL (ref 31.5–36.5)
MCV RBC AUTO: 94 FL (ref 78–100)
MONOCYTES # BLD AUTO: 0 10E9/L (ref 0–1.3)
MONOCYTES NFR BLD AUTO: 0 %
MUCOUS THREADS #/AREA URNS LPF: PRESENT /LPF
NEUTROPHILS # BLD AUTO: 22 10E9/L (ref 1.6–8.3)
NEUTROPHILS NFR BLD AUTO: 96.5 %
NEUTS BAND NFR FLD MANUAL: 12 %
NITRATE UR QL: NEGATIVE
OTHER CELLS FLD MANUAL: 81 %
PH UR STRIP: 5.5 PH (ref 5–7)
PLATELET # BLD AUTO: 270 10E9/L (ref 150–450)
PLATELET # BLD EST: ABNORMAL 10*3/UL
POTASSIUM SERPL-SCNC: 4.1 MMOL/L (ref 3.4–5.3)
PROT FLD-MCNC: 0.9 G/DL
PROT SERPL-MCNC: 6 G/DL (ref 6.8–8.8)
RBC # BLD AUTO: 3.25 10E12/L (ref 3.8–5.2)
RBC #/AREA URNS AUTO: 2 /HPF (ref 0–2)
SODIUM SERPL-SCNC: 130 MMOL/L (ref 133–144)
SOURCE: ABNORMAL
SP GR UR STRIP: 1.03 (ref 1–1.03)
SPECIMEN SOURCE FLD: NORMAL
SPECIMEN SOURCE: NORMAL
SPECIMEN SOURCE: NORMAL
SQUAMOUS #/AREA URNS AUTO: <1 /HPF (ref 0–1)
TRANS CELLS #/AREA URNS HPF: <1 /HPF (ref 0–1)
UROBILINOGEN UR STRIP-MCNC: NORMAL MG/DL (ref 0–2)
WBC # BLD AUTO: 22.8 10E9/L (ref 4–11)
WBC # FLD AUTO: 137 /UL
WBC #/AREA URNS AUTO: 1 /HPF (ref 0–5)

## 2021-06-10 PROCEDURE — 999N000141 HC STATISTIC PRE-PROCEDURE NURSING ASSESSMENT: Performed by: INTERNAL MEDICINE

## 2021-06-10 PROCEDURE — 80053 COMPREHEN METABOLIC PANEL: CPT | Performed by: PHYSICIAN ASSISTANT

## 2021-06-10 PROCEDURE — 82040 ASSAY OF SERUM ALBUMIN: CPT | Performed by: INTERNAL MEDICINE

## 2021-06-10 PROCEDURE — 250N000011 HC RX IP 250 OP 636: Performed by: PHYSICIAN ASSISTANT

## 2021-06-10 PROCEDURE — 0FC68ZZ EXTIRPATION OF MATTER FROM LEFT HEPATIC DUCT, VIA NATURAL OR ARTIFICIAL OPENING ENDOSCOPIC: ICD-10-PCS | Performed by: INTERNAL MEDICINE

## 2021-06-10 PROCEDURE — 250N000013 HC RX MED GY IP 250 OP 250 PS 637: Performed by: PHYSICIAN ASSISTANT

## 2021-06-10 PROCEDURE — 999N000065 XR CHEST PORT 1 VIEW

## 2021-06-10 PROCEDURE — 36592 COLLECT BLOOD FROM PICC: CPT | Performed by: INTERNAL MEDICINE

## 2021-06-10 PROCEDURE — 36592 COLLECT BLOOD FROM PICC: CPT | Performed by: PHYSICIAN ASSISTANT

## 2021-06-10 PROCEDURE — 710N000010 HC RECOVERY PHASE 1, LEVEL 2, PER MIN: Performed by: INTERNAL MEDICINE

## 2021-06-10 PROCEDURE — 999N000044 HC STATISTIC CVC DRESSING CHANGE

## 2021-06-10 PROCEDURE — 120N000002 HC R&B MED SURG/OB UMMC

## 2021-06-10 PROCEDURE — 250N000013 HC RX MED GY IP 250 OP 250 PS 637: Performed by: INTERNAL MEDICINE

## 2021-06-10 PROCEDURE — 87015 SPECIMEN INFECT AGNT CONCNTJ: CPT | Performed by: NURSE PRACTITIONER

## 2021-06-10 PROCEDURE — C1769 GUIDE WIRE: HCPCS | Performed by: INTERNAL MEDICINE

## 2021-06-10 PROCEDURE — 250N000012 HC RX MED GY IP 250 OP 636 PS 637: Performed by: PHYSICIAN ASSISTANT

## 2021-06-10 PROCEDURE — P9047 ALBUMIN (HUMAN), 25%, 50ML: HCPCS | Performed by: PHYSICIAN ASSISTANT

## 2021-06-10 PROCEDURE — 71045 X-RAY EXAM CHEST 1 VIEW: CPT | Mod: 26 | Performed by: RADIOLOGY

## 2021-06-10 PROCEDURE — 83605 ASSAY OF LACTIC ACID: CPT | Performed by: INTERNAL MEDICINE

## 2021-06-10 PROCEDURE — C1726 CATH, BAL DIL, NON-VASCULAR: HCPCS | Performed by: INTERNAL MEDICINE

## 2021-06-10 PROCEDURE — 74177 CT ABD & PELVIS W/CONTRAST: CPT | Mod: 26 | Performed by: RADIOLOGY

## 2021-06-10 PROCEDURE — 370N000017 HC ANESTHESIA TECHNICAL FEE, PER MIN: Performed by: INTERNAL MEDICINE

## 2021-06-10 PROCEDURE — 999N000181 XR SURGERY CARM FLUORO GREATER THAN 5 MIN W STILLS: Mod: TC

## 2021-06-10 PROCEDURE — 84157 ASSAY OF PROTEIN OTHER: CPT | Performed by: NURSE PRACTITIONER

## 2021-06-10 PROCEDURE — 81001 URINALYSIS AUTO W/SCOPE: CPT | Performed by: PHYSICIAN ASSISTANT

## 2021-06-10 PROCEDURE — 258N000003 HC RX IP 258 OP 636: Performed by: STUDENT IN AN ORGANIZED HEALTH CARE EDUCATION/TRAINING PROGRAM

## 2021-06-10 PROCEDURE — 99207 PR CDG-CHARGE REQUIRED MANUAL ENTRY: CPT | Performed by: INTERNAL MEDICINE

## 2021-06-10 PROCEDURE — 82042 OTHER SOURCE ALBUMIN QUAN EA: CPT | Performed by: NURSE PRACTITIONER

## 2021-06-10 PROCEDURE — 0FHB8DZ INSERTION OF INTRALUMINAL DEVICE INTO HEPATOBILIARY DUCT, VIA NATURAL OR ARTIFICIAL OPENING ENDOSCOPIC: ICD-10-PCS | Performed by: INTERNAL MEDICINE

## 2021-06-10 PROCEDURE — 250N000025 HC SEVOFLURANE, PER MIN: Performed by: INTERNAL MEDICINE

## 2021-06-10 PROCEDURE — 360N000082 HC SURGERY LEVEL 2 W/ FLUORO, PER MIN: Performed by: INTERNAL MEDICINE

## 2021-06-10 PROCEDURE — 87070 CULTURE OTHR SPECIMN AEROBIC: CPT | Performed by: NURSE PRACTITIONER

## 2021-06-10 PROCEDURE — 255N000002 HC RX 255 OP 636: Performed by: INTERNAL MEDICINE

## 2021-06-10 PROCEDURE — 74177 CT ABD & PELVIS W/CONTRAST: CPT

## 2021-06-10 PROCEDURE — 250N000011 HC RX IP 250 OP 636: Performed by: STUDENT IN AN ORGANIZED HEALTH CARE EDUCATION/TRAINING PROGRAM

## 2021-06-10 PROCEDURE — 0FPB8DZ REMOVAL OF INTRALUMINAL DEVICE FROM HEPATOBILIARY DUCT, VIA NATURAL OR ARTIFICIAL OPENING ENDOSCOPIC: ICD-10-PCS | Performed by: INTERNAL MEDICINE

## 2021-06-10 PROCEDURE — 99233 SBSQ HOSP IP/OBS HIGH 50: CPT | Performed by: INTERNAL MEDICINE

## 2021-06-10 PROCEDURE — C1876 STENT, NON-COA/NON-COV W/DEL: HCPCS | Performed by: INTERNAL MEDICINE

## 2021-06-10 PROCEDURE — 0W9G3ZZ DRAINAGE OF PERITONEAL CAVITY, PERCUTANEOUS APPROACH: ICD-10-PCS | Performed by: INTERNAL MEDICINE

## 2021-06-10 PROCEDURE — 250N000011 HC RX IP 250 OP 636: Performed by: INTERNAL MEDICINE

## 2021-06-10 PROCEDURE — 49083 ABD PARACENTESIS W/IMAGING: CPT | Performed by: INTERNAL MEDICINE

## 2021-06-10 PROCEDURE — 99223 1ST HOSP IP/OBS HIGH 75: CPT | Mod: 25 | Performed by: NURSE PRACTITIONER

## 2021-06-10 PROCEDURE — 258N000003 HC RX IP 258 OP 636: Performed by: PHYSICIAN ASSISTANT

## 2021-06-10 PROCEDURE — 0FC58ZZ EXTIRPATION OF MATTER FROM RIGHT HEPATIC DUCT, VIA NATURAL OR ARTIFICIAL OPENING ENDOSCOPIC: ICD-10-PCS | Performed by: INTERNAL MEDICINE

## 2021-06-10 PROCEDURE — 250N000009 HC RX 250: Performed by: INTERNAL MEDICINE

## 2021-06-10 PROCEDURE — 250N000011 HC RX IP 250 OP 636: Performed by: NURSE ANESTHETIST, CERTIFIED REGISTERED

## 2021-06-10 PROCEDURE — 87205 SMEAR GRAM STAIN: CPT | Performed by: NURSE PRACTITIONER

## 2021-06-10 PROCEDURE — 258N000003 HC RX IP 258 OP 636: Performed by: INTERNAL MEDICINE

## 2021-06-10 PROCEDURE — 85025 COMPLETE CBC W/AUTO DIFF WBC: CPT | Performed by: PHYSICIAN ASSISTANT

## 2021-06-10 PROCEDURE — 272N000001 HC OR GENERAL SUPPLY STERILE: Performed by: INTERNAL MEDICINE

## 2021-06-10 PROCEDURE — 89051 BODY FLUID CELL COUNT: CPT | Performed by: NURSE PRACTITIONER

## 2021-06-10 PROCEDURE — 87086 URINE CULTURE/COLONY COUNT: CPT | Performed by: PHYSICIAN ASSISTANT

## 2021-06-10 PROCEDURE — 82945 GLUCOSE OTHER FLUID: CPT | Performed by: NURSE PRACTITIONER

## 2021-06-10 DEVICE — STENT JOHLIN PANCREA WEDGE 10FRX22CM W/INTRO G26828
Type: IMPLANTABLE DEVICE | Site: BILE DUCT | Status: NON-FUNCTIONAL
Removed: 2021-06-25

## 2021-06-10 RX ORDER — SODIUM CHLORIDE, SODIUM LACTATE, POTASSIUM CHLORIDE, CALCIUM CHLORIDE 600; 310; 30; 20 MG/100ML; MG/100ML; MG/100ML; MG/100ML
INJECTION, SOLUTION INTRAVENOUS CONTINUOUS PRN
Status: DISCONTINUED | OUTPATIENT
Start: 2021-06-10 | End: 2021-06-10

## 2021-06-10 RX ORDER — SODIUM CHLORIDE, SODIUM LACTATE, POTASSIUM CHLORIDE, CALCIUM CHLORIDE 600; 310; 30; 20 MG/100ML; MG/100ML; MG/100ML; MG/100ML
INJECTION, SOLUTION INTRAVENOUS CONTINUOUS
Status: DISCONTINUED | OUTPATIENT
Start: 2021-06-10 | End: 2021-06-10 | Stop reason: HOSPADM

## 2021-06-10 RX ORDER — PROPOFOL 10 MG/ML
INJECTION, EMULSION INTRAVENOUS PRN
Status: DISCONTINUED | OUTPATIENT
Start: 2021-06-10 | End: 2021-06-10

## 2021-06-10 RX ORDER — ALBUMIN (HUMAN) 12.5 G/50ML
50 SOLUTION INTRAVENOUS ONCE
Status: COMPLETED | OUTPATIENT
Start: 2021-06-10 | End: 2021-06-10

## 2021-06-10 RX ORDER — INDOMETHACIN 50 MG/1
SUPPOSITORY RECTAL PRN
Status: DISCONTINUED | OUTPATIENT
Start: 2021-06-10 | End: 2021-06-10 | Stop reason: HOSPADM

## 2021-06-10 RX ORDER — ONDANSETRON 2 MG/ML
4 INJECTION INTRAMUSCULAR; INTRAVENOUS EVERY 30 MIN PRN
Status: DISCONTINUED | OUTPATIENT
Start: 2021-06-10 | End: 2021-06-10 | Stop reason: HOSPADM

## 2021-06-10 RX ORDER — NALOXONE HYDROCHLORIDE 0.4 MG/ML
0.2 INJECTION, SOLUTION INTRAMUSCULAR; INTRAVENOUS; SUBCUTANEOUS
Status: DISCONTINUED | OUTPATIENT
Start: 2021-06-10 | End: 2021-06-10 | Stop reason: HOSPADM

## 2021-06-10 RX ORDER — ONDANSETRON 4 MG/1
4 TABLET, ORALLY DISINTEGRATING ORAL EVERY 30 MIN PRN
Status: DISCONTINUED | OUTPATIENT
Start: 2021-06-10 | End: 2021-06-10 | Stop reason: HOSPADM

## 2021-06-10 RX ORDER — IOPAMIDOL 510 MG/ML
INJECTION, SOLUTION INTRAVASCULAR PRN
Status: DISCONTINUED | OUTPATIENT
Start: 2021-06-10 | End: 2021-06-10 | Stop reason: HOSPADM

## 2021-06-10 RX ORDER — LIDOCAINE 40 MG/G
CREAM TOPICAL
Status: DISCONTINUED | OUTPATIENT
Start: 2021-06-10 | End: 2021-06-12 | Stop reason: HOSPADM

## 2021-06-10 RX ORDER — ONDANSETRON 2 MG/ML
INJECTION INTRAMUSCULAR; INTRAVENOUS PRN
Status: DISCONTINUED | OUTPATIENT
Start: 2021-06-10 | End: 2021-06-10

## 2021-06-10 RX ORDER — FENTANYL CITRATE 50 UG/ML
INJECTION, SOLUTION INTRAMUSCULAR; INTRAVENOUS PRN
Status: DISCONTINUED | OUTPATIENT
Start: 2021-06-10 | End: 2021-06-10

## 2021-06-10 RX ORDER — HEPARIN SODIUM,PORCINE 10 UNIT/ML
5-10 VIAL (ML) INTRAVENOUS
Status: DISCONTINUED | OUTPATIENT
Start: 2021-06-10 | End: 2021-06-12 | Stop reason: HOSPADM

## 2021-06-10 RX ORDER — MEPERIDINE HYDROCHLORIDE 25 MG/ML
12.5 INJECTION INTRAMUSCULAR; INTRAVENOUS; SUBCUTANEOUS
Status: DISCONTINUED | OUTPATIENT
Start: 2021-06-10 | End: 2021-06-10 | Stop reason: HOSPADM

## 2021-06-10 RX ORDER — NALOXONE HYDROCHLORIDE 0.4 MG/ML
0.4 INJECTION, SOLUTION INTRAMUSCULAR; INTRAVENOUS; SUBCUTANEOUS
Status: DISCONTINUED | OUTPATIENT
Start: 2021-06-10 | End: 2021-06-10 | Stop reason: HOSPADM

## 2021-06-10 RX ORDER — IOPAMIDOL 755 MG/ML
95 INJECTION, SOLUTION INTRAVASCULAR ONCE
Status: COMPLETED | OUTPATIENT
Start: 2021-06-10 | End: 2021-06-10

## 2021-06-10 RX ORDER — HEPARIN SODIUM,PORCINE 10 UNIT/ML
5-10 VIAL (ML) INTRAVENOUS EVERY 24 HOURS
Status: DISCONTINUED | OUTPATIENT
Start: 2021-06-10 | End: 2021-06-12 | Stop reason: HOSPADM

## 2021-06-10 RX ORDER — FENTANYL CITRATE 50 UG/ML
25-50 INJECTION, SOLUTION INTRAMUSCULAR; INTRAVENOUS
Status: DISCONTINUED | OUTPATIENT
Start: 2021-06-10 | End: 2021-06-10 | Stop reason: HOSPADM

## 2021-06-10 RX ORDER — DEXAMETHASONE SODIUM PHOSPHATE 4 MG/ML
INJECTION, SOLUTION INTRA-ARTICULAR; INTRALESIONAL; INTRAMUSCULAR; INTRAVENOUS; SOFT TISSUE PRN
Status: DISCONTINUED | OUTPATIENT
Start: 2021-06-10 | End: 2021-06-10

## 2021-06-10 RX ADMIN — PHENYLEPHRINE HYDROCHLORIDE 100 MCG: 10 INJECTION INTRAVENOUS at 14:38

## 2021-06-10 RX ADMIN — PIPERACILLIN SODIUM AND TAZOBACTAM SODIUM 3.38 G: 3; .375 INJECTION, POWDER, LYOPHILIZED, FOR SOLUTION INTRAVENOUS at 03:21

## 2021-06-10 RX ADMIN — PIPERACILLIN SODIUM AND TAZOBACTAM SODIUM 3.38 G: 3; .375 INJECTION, POWDER, LYOPHILIZED, FOR SOLUTION INTRAVENOUS at 15:27

## 2021-06-10 RX ADMIN — PIPERACILLIN SODIUM AND TAZOBACTAM SODIUM 3.38 G: 3; .375 INJECTION, POWDER, LYOPHILIZED, FOR SOLUTION INTRAVENOUS at 22:35

## 2021-06-10 RX ADMIN — PHENYLEPHRINE HYDROCHLORIDE 100 MCG: 10 INJECTION INTRAVENOUS at 15:20

## 2021-06-10 RX ADMIN — Medication 5 ML: at 10:46

## 2021-06-10 RX ADMIN — Medication 5 ML: at 03:22

## 2021-06-10 RX ADMIN — URSODIOL 300 MG: 300 CAPSULE ORAL at 22:16

## 2021-06-10 RX ADMIN — FENTANYL CITRATE 50 MCG: 50 INJECTION, SOLUTION INTRAMUSCULAR; INTRAVENOUS at 14:25

## 2021-06-10 RX ADMIN — Medication 100 MG: at 14:26

## 2021-06-10 RX ADMIN — METHYLPREDNISOLONE 32 MG: 32 TABLET ORAL at 05:17

## 2021-06-10 RX ADMIN — IOPAMIDOL 95 ML: 755 INJECTION, SOLUTION INTRAVENOUS at 07:42

## 2021-06-10 RX ADMIN — MIDAZOLAM 2 MG: 1 INJECTION INTRAMUSCULAR; INTRAVENOUS at 14:17

## 2021-06-10 RX ADMIN — DIPHENHYDRAMINE HYDROCHLORIDE 50 MG: 50 CAPSULE ORAL at 05:17

## 2021-06-10 RX ADMIN — SIMVASTATIN 20 MG: 20 TABLET, FILM COATED ORAL at 03:21

## 2021-06-10 RX ADMIN — DEXAMETHASONE SODIUM PHOSPHATE 4 MG: 4 INJECTION, SOLUTION INTRA-ARTICULAR; INTRALESIONAL; INTRAMUSCULAR; INTRAVENOUS; SOFT TISSUE at 14:30

## 2021-06-10 RX ADMIN — SIMVASTATIN 20 MG: 20 TABLET, FILM COATED ORAL at 22:16

## 2021-06-10 RX ADMIN — ALBUMIN HUMAN 50 G: 0.25 SOLUTION INTRAVENOUS at 22:13

## 2021-06-10 RX ADMIN — SODIUM CHLORIDE, POTASSIUM CHLORIDE, SODIUM LACTATE AND CALCIUM CHLORIDE: 600; 310; 30; 20 INJECTION, SOLUTION INTRAVENOUS at 14:17

## 2021-06-10 RX ADMIN — PROPOFOL 150 MG: 10 INJECTION, EMULSION INTRAVENOUS at 14:25

## 2021-06-10 RX ADMIN — VANCOMYCIN HYDROCHLORIDE 750 MG: 10 INJECTION, POWDER, LYOPHILIZED, FOR SOLUTION INTRAVENOUS at 22:23

## 2021-06-10 RX ADMIN — Medication 5 ML: at 06:55

## 2021-06-10 RX ADMIN — FENTANYL CITRATE 50 MCG: 50 INJECTION, SOLUTION INTRAMUSCULAR; INTRAVENOUS at 14:45

## 2021-06-10 RX ADMIN — ONDANSETRON 4 MG: 2 INJECTION INTRAMUSCULAR; INTRAVENOUS at 15:30

## 2021-06-10 RX ADMIN — PIPERACILLIN SODIUM AND TAZOBACTAM SODIUM 3.38 G: 3; .375 INJECTION, POWDER, LYOPHILIZED, FOR SOLUTION INTRAVENOUS at 09:16

## 2021-06-10 RX ADMIN — VANCOMYCIN HYDROCHLORIDE 750 MG: 10 INJECTION, POWDER, LYOPHILIZED, FOR SOLUTION INTRAVENOUS at 06:50

## 2021-06-10 ASSESSMENT — ACTIVITIES OF DAILY LIVING (ADL)
CONCENTRATING,_REMEMBERING_OR_MAKING_DECISIONS_DIFFICULTY: NO
ADLS_ACUITY_SCORE: 13
ADLS_ACUITY_SCORE: 13
WALKING_OR_CLIMBING_STAIRS_DIFFICULTY: NO
DRESSING/BATHING_DIFFICULTY: NO
TOILETING_ISSUES: NO
DOING_ERRANDS_INDEPENDENTLY_DIFFICULTY: NO
VISION_MANAGEMENT: GLASSES
DIFFICULTY_EATING/SWALLOWING: NO
DIFFICULTY_COMMUNICATING: NO
FALL_HISTORY_WITHIN_LAST_SIX_MONTHS: NO
WEAR_GLASSES_OR_BLIND: YES
ADLS_ACUITY_SCORE: 13
ADLS_ACUITY_SCORE: 13

## 2021-06-10 ASSESSMENT — MIFFLIN-ST. JEOR: SCORE: 1285.78

## 2021-06-10 NOTE — PROGRESS NOTES
Admitted/transferred from: ED  2 RN full   skin assessment completed by Ann Marie Riley, ADRIANA and Juventino Taylor RN.  Skin assessment finding: Right lower abdomen paracentesis puncture covered with bandage.  Interventions/actions: None     Will continue to monitor.

## 2021-06-10 NOTE — ANESTHESIA PREPROCEDURE EVALUATION
Anesthesia Pre-Procedure Evaluation    Patient: Berta Nava   MRN: [id number] : 1965        Preoperative Diagnosis: Cholangitis [K83.09]   Procedure : Procedure(s):  ENDOSCOPIC RETROGRADE CHOLANGIOPANCREATOGRAPHY     Past Medical History:   Diagnosis Date     Ascites      Biliary cirrhosis (H)      Cholangitis, sclerosing      Cirrhosis of liver with ascites (H) 3/3/2021     Hearing loss of left ear     wears a hearing aide     History of low potassium      Hyperlipidemia      Hypertension      SBP (spontaneous bacterial peritonitis) (H) 2021     Sjogren's syndrome (H)      Ulcerative colitis (H)      Ulcerative pancolitis (H)       Past Surgical History:   Procedure Laterality Date     ENDOSCOPIC RETROGRADE CHOLANGIOPANCREATOGRAPHY, EXCHANGE TUBE/STENT N/A 2021    Procedure: ENDOSCOPIC RETROGRADE CHOLANGIOPANCREATOGRAPHY WITH STENT EXCHANGE, STONE EXTRACTION, AND DILATION;  Surgeon: Gregory Gabriel MD;  Location: [facility] OR     ENDOSCOPIC RETROGRADE CHOLANGIOPANCREATOGRAPHY, EXCHANGE TUBE/STENT N/A 5/10/2021    Procedure: ENDOSCOPIC RETROGRADE CHOLANGIOPANCREATOGRAPHY with biliary dilation, stone removal, stent exchange;  Surgeon: Gregory Gabriel MD;  Location: [facility] OR     GYN SURGERY      bilat fallopian tubes and ovaries removed     PICC DOUBLE LUMEN PLACEMENT Right 2021    42cm (2cm external), Lateral brachial vein      Allergies   Allergen Reactions     Diagnostic X[employer] Hives     PATIENT HAD HIVE REACTION AFTER ADMINISTERING CT CONTRAST DYE.       Contrast Dye       Social History     Tobacco Use     Smoking status: Never Smoker     Smokeless tobacco: Never Used   Substance Use Topics     Alcohol use: No     Comment: Last drink was 2017      Wt Readings from Last 1 Encounters:   06/10/21 70 kg (154 lb 4.8 oz)        Anesthesia Evaluation   Pt has had prior anesthetic.         ROS/MED HX  ENT/Pulmonary:       Neurologic:       Cardiovascular: Comment: EF 60%, no valvular abnl    (+)  Dyslipidemia hypertension-----    METS/Exercise Tolerance:     Hematologic:       Musculoskeletal:       GI/Hepatic: Comment: Primary sclerosing cholangitis, acute cholangitis, cirrhosis with ascites, ulcerative colitis    (+) liver disease,     Renal/Genitourinary:       Endo:       Psychiatric/Substance Use:       Infectious Disease:       Malignancy:       Other: Comment: sjogren's           Physical Exam    Airway        Mallampati: I   TM distance: > 3 FB   Neck ROM: full   Mouth opening: > 3 cm    Respiratory Devices and Support         Dental  no notable dental history         Cardiovascular             Pulmonary   pulmonary exam normal                OUTSIDE LABS:  CBC:   Lab Results   Component Value Date    WBC 22.8 (H) 06/10/2021    WBC 27.5 (H) 06/09/2021    HGB 9.8 (L) 06/10/2021    HGB 11.6 (L) 06/09/2021    HCT 30.6 (L) 06/10/2021    HCT 36.6 06/09/2021     06/10/2021     06/09/2021     BMP:   Lab Results   Component Value Date     (L) 06/10/2021     (L) 06/09/2021    POTASSIUM 4.1 06/10/2021    POTASSIUM 4.7 06/09/2021    CHLORIDE 105 06/10/2021    CHLORIDE 100 06/09/2021    CO2 17 (L) 06/10/2021    CO2 18 (L) 06/09/2021    BUN 22 06/10/2021    BUN 21 06/09/2021    CR 0.59 06/10/2021    CR 0.64 06/09/2021     (H) 06/10/2021     (H) 06/09/2021     COAGS:   Lab Results   Component Value Date    PTT 32 06/09/2021    INR 1.65 (H) 06/09/2021     POC:   Lab Results   Component Value Date    BGM 75 05/05/2021     HEPATIC:   Lab Results   Component Value Date    ALBUMIN 2.5 (L) 06/10/2021    PROTTOTAL 6.0 (L) 06/10/2021    ALT 20 06/10/2021    AST 36 06/10/2021    ALKPHOS 234 (H) 06/10/2021    BILITOTAL 1.7 (H) 06/10/2021    MIHIR 48 06/09/2021     OTHER:   Lab Results   Component Value Date    LACT 1.1 04/30/2021    A1C 4.3 04/29/2021    MARIELENA 8.4 (L) 06/10/2021    PHOS 2.6 05/16/2018    MAG 2.7 (H) 05/14/2018    LIPASE 203 06/09/2021    CRP 23.1 (H) 06/08/2021        Anesthesia Plan    ASA Status:  3   NPO Status:  NPO Appropriate    Anesthesia Type: General.     - Airway: ETT   Induction: Intravenous.   Maintenance: Inhalation.        Consents    Anesthesia Plan(s) and associated risks, benefits, and realistic alternatives discussed. Questions answered and patient/representative(s) expressed understanding.     - Discussed with:  Patient         Postoperative Care       PONV prophylaxis: Ondansetron (or other 5HT-3), Dexamethasone or Solumedrol     Comments:                GARRETT NELSON MD

## 2021-06-10 NOTE — PROGRESS NOTES
This is a recent snapshot of the patient's [facility] medical record.  For current drug dose and complete information and questions, call [phone] or In [facility] pool, [facility]v home infusion ([id number])  CSN Number:  [id number]

## 2021-06-10 NOTE — PLAN OF CARE
Assumed care [id number]  VSS on RA. Except BP runs low last BP 9/49. Pt triggered sepsis and lactic acid draw is ordered.  Neuro: A&Ox4         Respiratory: LS clear  GI/: Denies nausea; +BM/flatus; Voids spontaneously   Diet/appetite: NPO for possible ERCP  Activity: SBA  Pain: Denies pain  Skin/drains: Pt has right lower abdominal puncture from paracentesis covered with bandage  Lines: Right brachial double lumen PICC line. One hep locked and the other TKO  Plan this morning CT scan at 0730 and pt is pre medicated for it. UA and UC sample was sent to lab. Continue plan of care

## 2021-06-10 NOTE — CONSULTS
Hepatology Consultation    Berta Nava   MRN# [id number]     Age: 55 year old YOB: 1965       Referring provider: Pati Solares  Attending Hepatologist: Dr. Emma Mao  Consult requested for:  Fevers, cholangitis       Assessment and Recommendation:   Assessment:   Ms. 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       Recommendations:   1. Cholangitis  2. PSC  3. Hepatic abscess  - past ERCP's with stones/sludge/debris in the biliary tree occluding stent. Plan for repeat ERCP today for stent exchange  - IV abx with zosyn and vanco  - BC pending  - unclear if hepatic abscess communicating with biliary tree. May need IR drainage if not improved. Had seen ID 6/9 as OP, would recommend ID consult while inpatient to guide future abx and comment about transplant candidacy.   - MELD 24, ABO AB. In liver transplant evaluation.   - has not had evidence of HE.  - US 6/9 without concern for HCC    4. Ascites  5. Hx SBP  - diagnostic and therapeutic para today.   - has weekly leann. Holding diuretics in setting of infection.     6. Ulcerative colitis  - last colonoscopy 12/2020 with no colitis. On mesalamine monotherapy    7. Esophageal varices  - large EV seen on prior ERCP's. No plans for banding at this time due to potential for repeat early procedures  - no evidence of bleeding.     8. Immunizations  - due for 2nd dose of HAV and HBV. Please give at time of discharge.     Plan of care discussed with Dr. Emma Mao    Thank you for the opportunity to be involved in Berta Nava care. Please call with any questions or concerns.     Andria Rowe  "APRN, CNP  [facility]at[facility] [place]  Text link               History of Present Illness:   Berta Nava is a 55 year old female with a history of PSC causing cirrhosis. Her liver disease has been complicated by recent strep mitis bacteremia, biliary stricutres with stones s/p sphincterotomy and stent placement (last ERCP 5/10/21), hepatic abscess- unclear if communicating with biliary tree that was stented on ceftriaxone and flagyl ppx, hx UC quiescent on mesalamine (last colon 12/2020), hyponatremia, large EV (not banded due to possible early ERCP), ascites with weekly para, SBP, HTN, EUS portal lymph node (12/1/20) demonstrating granulomatous dx concerning for sarcoidosis, pancreatic mucinous cystadenoma who was admitted with fevers and chills. Her WBC increased to 27.5. Initial US abd did not have evidence of biliary obstruction but she has had prior ERCP's with partially occluded biliary stents.     She reports feeling in her normal state of health up until a few hours prior to admission. She denies any fatigue, fevers (other than day of admission), melena, hematochezia, abdominal pain or acholic stools. She has not had a change of mentation and having daily BM's. She has had a decrease in appetite due to flagyl causing \"metal\" taste in her mouth. She has been active.     She had a crown placed on a tooth and no other concerns per her dental provider. She has been on daily IV abx and is due for her weekly repeat para on 6/11. She remains in liver transplant evaluation and only needs control of current infection and discussion in transplant meeting.            Past Medical History:     Past Medical History:   Diagnosis Date     Ascites      Biliary cirrhosis (H)      Cholangitis, sclerosing      Cirrhosis of liver with ascites (H) 3/3/2021     Hearing loss of left ear     wears a hearing aide     History of low potassium      Hyperlipidemia      Hypertension      SBP (spontaneous bacterial peritonitis) (H) " 2021     Sjogren's syndrome (H)      Ulcerative colitis (H)      Ulcerative pancolitis (H)               Past Surgical History:     Past Surgical History:   Procedure Laterality Date     ENDOSCOPIC RETROGRADE CHOLANGIOPANCREATOGRAPHY, EXCHANGE TUBE/STENT N/A 2021    Procedure: ENDOSCOPIC RETROGRADE CHOLANGIOPANCREATOGRAPHY WITH STENT EXCHANGE, STONE EXTRACTION, AND DILATION;  Surgeon: Gregory Gabriel MD;  Location: [facility] OR     ENDOSCOPIC RETROGRADE CHOLANGIOPANCREATOGRAPHY, EXCHANGE TUBE/STENT N/A 5/10/2021    Procedure: ENDOSCOPIC RETROGRADE CHOLANGIOPANCREATOGRAPHY with biliary dilation, stone removal, stent exchange;  Surgeon: Gregory Gabriel MD;  Location: [facility] OR     GYN SURGERY      bilat fallopian tubes and ovaries removed     PICC DOUBLE LUMEN PLACEMENT Right 2021    42cm (2cm external), Lateral brachial vein              Social History:     Social History     Tobacco Use     Smoking status: Never Smoker     Smokeless tobacco: Never Used   Substance Use Topics     Alcohol use: No     Comment: Last drink was              Family History:   Denies a family history of overt liver disease.  Notes that her mother  of complications of an angiosarcoma.  Her father had coronary artery disease with a heart attack at the age of 46.  Ultimately he underwent a heart transplant and lived for 15 years, dying in his 70s.  There is no family history of ulcerative colitis, Crohn's disease.  Unclear history of thyroid disease.             Immunizations:     Immunization History   Administered Date(s) Administered     COVID-19,PF,Moderna 2021, 04/15/2021     HepA-Adult 2021     HepB-Adult 2021   tdap 14         Allergies:     Allergies   Allergen Reactions     Diagnostic X-Ray Materials Hives     PATIENT HAD HIVE REACTION AFTER ADMINISTERING CT CONTRAST DYE.       Contrast Dye              Medications:   @  Medications Prior to Admission   Medication Sig Dispense Refill Last Dose      "calcium carbonate 600 mg-vitamin D 400 units (CALTRATE) 600-400 MG-UNIT per tablet Take 1 tablet by mouth daily        carvedilol (COREG) 6.25 MG tablet Take 1 tablet (6.25 mg) by mouth 2 times daily (with meals) 60 tablet 3      cefTRIAXone (ROCEPHIN) 2 GM vial Inject 2 g into the vein every 24 hours 700 mL 0      furosemide (LASIX) 20 MG tablet Take 20 mg by mouth daily        mesalamine (LIALDA) 1.2 g EC tablet Take 2,400 mg by mouth daily (with breakfast)        metroNIDAZOLE (FLAGYL) 500 MG tablet Take 1 tablet (500 mg) by mouth 3 times daily 105 tablet 0      multivitamin (CENTRUM SILVER) tablet Take 1 tablet by mouth daily        potassium chloride ER (MICRO-K) 10 MEQ CR capsule Take 10 mEq by mouth At Bedtime        simvastatin (ZOCOR) 20 MG tablet Take 20 mg by mouth At Bedtime        spironolactone (ALDACTONE) 50 MG tablet Take 50 mg by mouth daily        ursodiol (ACTIGALL) 300 MG capsule Take 300 mg by mouth 2 times daily        lisinopril (ZESTRIL) 10 MG tablet Take 10 mg by mouth daily        nadolol (CORGARD) 20 MG tablet Take 20 mg by mouth daily      @          Review of Systems:    ROS: 10 point ROS neg other than the symptoms noted above in the HPI.          Physical Exam:   Blood pressure 95/58, pulse 64, temperature 95.8  F (35.4  C), temperature source Oral, resp. rate 18, height 1.651 m (5' 5\"), weight 70 kg (154 lb 4.8 oz), SpO2 99 %. Body mass index is 25.68 kg/m .    General: In no acute distress, mild facial muscle wasting  Neuro: AOx3, No asterixis  HEENT: PERRL mild scleral icterus, Nooral lesions  Lymph:  Nocervical lymphadenoapthy  CV: S1/S2 with gr 2/6 murmurs, Skin warm and dry  Lungs: clear to auscultation Respirations even and nonlabored on room air  Abd: moderately distended, mildly tender with palpation. +BS  Extrem: +1 lower peripehral edema  Skin: mild jaundice  Psych: pleasant         Data:     Lab Results   Component Value Date    WBC 27.5 06/09/2021     Lab Results "   Component Value Date    RBC 3.83 06/09/2021     Lab Results   Component Value Date    HGB 11.6 06/09/2021     Lab Results   Component Value Date    HCT 36.6 06/09/2021     No components found for: MCT  Lab Results   Component Value Date    MCV 96 06/09/2021     Lab Results   Component Value Date    MCH 30.3 06/09/2021     Lab Results   Component Value Date    MCHC 31.7 06/09/2021     Lab Results   Component Value Date    RDW 53.6 06/09/2021     Lab Results   Component Value Date     06/09/2021       Last Basic Metabolic Panel:  Lab Results   Component Value Date     06/09/2021      Lab Results   Component Value Date    POTASSIUM 4.7 06/09/2021     Lab Results   Component Value Date    CHLORIDE 100 06/09/2021     Lab Results   Component Value Date    MARIELENA 8.9 06/09/2021     Lab Results   Component Value Date    CO2 18 06/09/2021     Lab Results   Component Value Date    BUN 21 06/09/2021     Lab Results   Component Value Date    CR 0.64 06/09/2021     Lab Results   Component Value Date     06/09/2021       Liver Function Studies -   Recent Labs   Lab Test 06/09/21  1527   PROTTOTAL 7.2   ALBUMIN 2.4*   BILITOTAL 2.2*   ALKPHOS 288*   AST 57*   ALT 23       Lab Results   Component Value Date    INR 1.65 06/09/2021       MELD-Na score: 24 at 6/9/2021  8:07 PM  MELD score: 15 at 6/9/2021  8:07 PM  Calculated from:  Serum Creatinine: 0.64 mg/dL (Rounded to 1 mg/dL) at 6/9/2021  3:27 PM  Serum Sodium: 126 mmol/L at 6/9/2021  3:27 PM  Total Bilirubin: 2.2 mg/dL at 6/9/2021  3:27 PM  INR(ratio): 1.65 at 6/9/2021  8:07 PM  Age: 55 years 9 months           Previous Endoscopy:     Results for orders placed or performed during the hospital encounter of 05/03/21   ENDOSCOPIC RETROGRADE CHOLANGIOPANCREATOGRAPHY   Result Value Ref Range    ERCP       [address] [place] [address] [phone]     Endoscopy  Department  _______________________________________________________________________________  Patient Name: Berta Nava           Procedure Date: 5/10/2021 11:05 AM  MRN: [id number]                       Account Number: [id number]  YOB: 1965              Admit Type: Inpatient  Age: 55                               Room: OR  Gender: Female                        Note Status: Finalized  Attending MD: Gregory Gabriel MD       Pause for the Cause: time out performed  Total Sedation Time:                    _______________________________________________________________________________     Procedure:           ERCP  Indications:         For therapy of ascending cholangitis, 55-year-old with a                        history of PSC causing cirrhosis complicated by                        quiescent UC on mesalamine, large EV (non banded),                        ascites, biliary strictur es and choledocholithiasis s/p                        stenting (1/6/21), granulomatous lymph node concerning                        for sarcoidosis, Sjogrens who was admitted on 4/29 with                        hypotension, fevers and lethargy and noted to have SBP                        and strep mitis bacteremia. Underwent ERCP on 5/5 and                        prior stent removed with cholangiogram showing diffuse                        biliary strictures/saccular dilations with intrahepatic                        stones. Mainly able to clear what was thought to be left                        intrahepatic (in retrospect on CT this was right                        intrahepatic branch) with evidence of possible liver                        abscess that couldn't be deeply cannulated. Did well                        post-procedure with decreasing LFTs but yesterday                        developed leukocytosis to 20 with CT showing further                        stone burden and persist ent liver collection/abscess.  Providers:            Gregory Gabriel MD  Referring MD:          Requesting Provider: Emma Mao MD, Thomas M. Leventhal, MD, Gonzalo Tapia MD  Medicines:           General Anesthesia, Indomethacin 100 mg TN; antibiotics                        as scheduled  Complications:       No immediate complications. Estimated blood loss:                        Minimal.  _______________________________________________________________________________  Procedure:           Pre-Anesthesia Assessment:                       - Prior to the procedure, a History and Physical was                        performed, and patient medications and allergies were                        reviewed. The patient is competent. The risks and                        benefits of the procedure and the sedation options and                        risks were discussed with the patient. All questions                        were answered and informed consent was obtained. Pa tient                        identification and proposed procedure were verified by                        the physician, the nurse, the anesthesiologist and the                        anesthetist in the procedure room. Mental Status                        Examination: alert and oriented. Airway Examination:                        normal oropharyngeal airway and neck mobility.                        Respiratory Examination: clear to auscultation. CV                        Examination: normal. Prophylactic Antibiotics: The                        patient requires prophylactic antibiotics for the                        planned ERCP due to active bacterial cholangitis. The                        patient received antibiotic therapy before the                        procedure. Prior Anticoagulants: The patient has taken                        no previous anticoagulant or antiplatelet agents. ASA                        Grade Assessment: III - A patient with severe systemic                          disease. After reviewing the risks and benefits, the                        patient was deemed in satisfactory condition to undergo                        the procedure. The anesthesia plan was to use general                        anesthesia. Immediately prior to administration of                        medications, the patient was re-assessed for adequacy to                        receive sedatives. The heart rate, respiratory rate,                        oxygen saturations, blood pressure, adequacy of                        pulmonary ventilation, and response to care were                        monitored throughout the procedure. The physical status                        of the patient was re-assessed after the procedure.                       After obtaining informed consent, the scope was passed                        under direct vision. Throughout the procedure, the                        patient's blood pressure, pulse, and oxygen saturations                        were mo nitored continuously. The was introduced through                        the mouth, and used to inject contrast into and used to                        inject contrast into the bile duct. The ERCP was                        accomplished without difficulty. The patient tolerated                        the procedure well.                                                                                   Findings:       A biliary stent was visible on the  film. The esophagus was        successfully intubated under direct vision. The scope was advanced from        the mouth to the duodenum. The pharynx, larynx and associated        structures, as well as the upper GI tract, were normal. A biliary        sphincterotomy had been performed. The sphincterotomy appeared open. One        plastic stent originating in the biliary tree was emerging from the        major papilla. The stent was visibly patent. One stent was removed from         the biliary tree using a snare. The bile duct was deep ly cannulated with        the short-nosed traction sphincterotome. Contrast was injected. I        personally interpreted the bile duct images. There was brisk flow of        contrast through the ducts. Image quality was excellent. Contrast        extended to the entire biliary tree. The entire biliary tree contained        multiple stenoses with attenuation of the tertiary branches.        Extravasation of contrast coming off of right intrahepatic branch into        presumably an abscess but this could not be deeply accessed. A        peripheral right intrahepatic branch full of stones could also not be        accessed. A 0.035 inch angled Glidewire and Visiglide wire were passed        into the biliary tree. Dilation of the left intrahepatic branches with a        4 mm balloon dilator was successful. The biliary tree was swept with a        12 mm balloon and basket starting at the left intrahepatic duct(s) and        right intrahepatic duct(s). Sludge was swept from the duct. Many st ones        were removed. No stones remained. One 10 Fr by 22 cm transpapillary        plastic stent was placed 21 cm into the right hepatic duct. Bile flowed        through the stent. The stent was in good position. One 8.5 Fr by 20 cm        transpapillary plastic stent was placed 19.5 cm into the left hepatic        duct. Bile flowed through the stent. The stent was in good position.                                                                                   Impression:          - One stent from the biliary tree was seen in the major                        papilla. Removed. This stent was previously thought to                        be in the left intrahepatic but on CT it was actually in                        the right posterior duct. Patient has a shrunken/rotated                        liver making fluoroscopic ductal anatomy interpretation                         more challenging.                       - Cholangiogram again showed diffusely irregular biliary                         tree with paucity/loss of multiple tertiary branches                        consistent with PSC/cirrhosis. A peripheral branch off                        of right anterior? contiained a saccular dilation with                        multiple filling defects/stones that could not be                        accessed. The ~2 cm collection/abscess off of the right                        posterior duct was again visualized and could not be                        accessed. More cephlad collection seen on CT did not                        fill.                       - Cystic duct/gallbladder opacified                       - Residual stones in the right posterior duct and left                        intrahepatic duct cleared with balloon dilation and                        balloon/basket sweeping. Large amount of stone,                        sludge/cast-like material with associated pus removed                       - One 10 Fr x 22 cm Johlin stent placed into the right                         posterior intrahepatic biliary tree                       - One 8.5 Fr x 20 cm Johlin stent placed into left                        intrahepatic duct                       - Grade III esophageal varices. Not banded at this time                        due to concern that early repeat ERCP intervention could                        be needed for recurrent cholangitis  Recommendation:      - Return patient to hospital strong for ongoing care.                       - Continue course of antibiotics                       - Monitor LFTs and clinical status for improvement.                       - Tentatively plan for repeat ERCP in 1 month with Dr. Gabriel                       - Hepatology to continue following                                                                                     Gregory  MD Harvey  ________________  Gregory Gabriel MD  5/10/2021 1:38:23 PM  I was physically present for the entire viewing portion of the exam.  __________________________   Signature of teaching physician  Evelio/Lakshmi Gabriel MD  Number of Addenda: 0    Note Initiated On: 5/10/2021 11:05 AM  Scope In:  Scope Out:           IMAGING:  No results found for this or any previous visit.  Results for orders placed during the hospital encounter of 06/09/21   XR Chest Port 1 View    Narrative EXAM: XR CHEST PORT 1 VIEW  6/9/2021 4:11 PM      HISTORY: cough, fever    COMPARISON: X-ray 4/29/2021    FINDINGS: Single view of the chest. Right PICC tip projects over the  right atrium. No pneumothorax. No pleural effusion. Borderline  enlarged cardiac silhouette. No acute airspace opacities. Partial  visualization of ERCP tubing, better seen on an imaging from  5/10/2021. No aggressive osseous lesions.      Impression IMPRESSION:  No acute airspace opacities.    I have personally reviewed the examination and initial interpretation  and I agree with the findings.    ASHLY AYERS MD     Colonoscopy 12/23/20  Impression:          - The examined portion of the ileum                        was normal.                       - Erythematous mucosa in the                        descending colon, in the transverse                        colon, at the hepatic flexure, in the                        ascending colon and in the cecum.                        Biopsied.                       - Normal mucosa in the rectum.                        Biopsied.                       - Internal hemorrhoids.  FINAL DIAGNOSIS A.  Colon, cecum, asc, biopsy:  Benign colonic mucosa, without active colitis or epithelial dysplasia    B.  Colon, transverse, biopsy:  Benign colonic mucosa, without active colitis or epithelial dysplasia    C.  Colon, descending, sig, biopsy:  Benign colonic mucosa, without active colitis or epithelial dysplasia    D.  Colon,  rectum, biopsy:  Benign colonic mucosa, without active colitis or epithelial dysplasia           US abd 6/9/21  IMPRESSION:   1.  Largely distended gallbladder with biliary sludge and mild  nonspecific wall thickening in the setting of liver disease and  ascites. No focal tenderness to suggest acute cholecystitis.  2.  Limited evaluation of the liver demonstrates cirrhotic morphology.  3.  Moderate volume of ascites.    CT abd w 6/10/21  Impression:   1. Cirrhotic liver with multifocal irregularity and dilation of the  biliary ducts which is improved in some locations, and unchanged in  others. No apparent progression of biliary dilation. No new cystic  collections.  2. Sequela of portal hypertension with large volume ascites and  numerous portosystemic collaterals.  3. Dilated gallbladder similar to prior. Intraluminal air presumably  related to stents.  4. Unchanged periaortic lymphadenopathy dating back to 10/27/2020.

## 2021-06-10 NOTE — ANESTHESIA PROCEDURE NOTES
Airway       Patient location during procedure: OR  Staff -        CRNA: Jagdish Sinclair APRN CRNA       Performed By: CRNA  Consent for Airway        Urgency: elective  Indications and Patient Condition       Indications for airway management: gerardo-procedural       Induction type:intravenous       Mask difficulty assessment: 1 - vent by mask    Final Airway Details       Final airway type: endotracheal airway       Successful airway: ETT - single  Endotracheal Airway Details        ETT size (mm): 7.0       Cuffed: yes       Successful intubation technique: direct laryngoscopy       DL Blade Type: MAC 3       Grade View of Cords: 1       Adjucts: stylet       Position: Right       Measured from: lips       Secured at (cm): 21    Post intubation assessment        Placement verified by: capnometry, equal breath sounds and chest rise        Number of attempts at approach: 1       Secured with: pink tape       Ease of procedure: easy       Dentition: Intact and Unchanged    Medication(s) Administered   Medication Administration Time: 6/10/2021 2:28 PM

## 2021-06-10 NOTE — OP NOTE
ERCP 06/10/2021  2:12 PM [facility], [address] [place] [address] [phone]     Endoscopy Department   _______________________________________________________________________________   Patient Name: Berta Nava           Procedure Date: 6/10/2021 2:12 PM   MRN: [id number]                       Account Number: [id number]   YOB: 1965              Admit Type: Inpatient   Age: 55                               Room: OR   Gender: Female                        Note Status: Finalized   Attending MD: Woodrow Lott MD   Total Sedation Time:   _______________________________________________________________________________       Procedure:           ERCP   Indications:         Abnormal abdominal CT, For therapy of ascending                        cholangitis, Primary sclerosing cholangitis, Abnormal                        liver function test   Providers:           Woodrow Lott MD   Patient Profile:     Ms Nava is a 55-year-old with a 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 Following resuscitation and stabilization                        she now proceeds to management by repeat ERCP.   Referring MD:           Medicines:           General Anesthesia, Antibiotics as scheduled,                        Indomethacin 100 mg OK   Complications:       No immediate complications.   _______________________________________________________________________________   Procedure:           Pre-Anesthesia Assessment:                        - Prior to the procedure, a  History and Physical was                        performed, and patient medications and allergies were                        reviewed. The patient is competent. The risks and                        benefits of the procedure and the sedation options and                        risks were discussed with the patient. All questions                        were answered and informed consent was obtained. Patient                        identification and proposed procedure were verified by                        the nurse in the pre-procedure area. Mental Status                        Examination: alert and oriented. Airway Examination:                        Mallampati Class II (the uvula but not tonsillar pillars                        visualized). Respiratory Examination: clear to                        auscultation. CV Examination: normal. ASA Grade                        Assessment: III - A patient with severe systemic                        disease. After reviewing the risks and benefits, the                        patient was deemed in satisfactory condition to undergo                        the procedure. The anesthesia plan was to use general                        anesthesia. Immediately prior to administration of                        medications, the patient was re-assessed for adequacy to                        receive sedatives. The heart rate, respiratory rate,                        oxygen saturations, blood pressure, adequacy of                        pulmonary ventilation, and response to care were                        monitored throughout the procedure. The physical status                        of the patient was re-assessed after the procedure.                        After obtaining informed consent, the scope was passed                        under direct vision. Throughout the procedure, the                        patient's blood pressure, pulse, and oxygen saturations                        were  "monitored continuously. The duodenoscope was                        introduced through the mouth, and used to inject                        contrast into and used to inject contrast into the bile                        duct. The ERCP was accomplished without difficulty. The                        patient tolerated the procedure well.                                                                                     Findings:        A  film of the right upper quadrant demonstrated the PICC flopping        back and forth as well as the two biliary stents. Limited white light        imaging of the foregut noted large esophageal varices as well as two        partially occluded biliary stents across a patent biliary        sphincterotomy. The two stents were removed from the biliary tree using        a snare. The bile duct was deeply cannulated with the short-nosed        traction sphincterotome and 12 mm balloon in concert with mutliple        0.025\" Visiglide wires. Contrast was injected and I personally        interpreted the bile duct images. Ductal flow of contrast was adequate,        image quality was excellent and contrast extended throughout the        intrahepatics. There were numerous stenoses of the intrahepatics and        bifurcation consistent with her known PSC. Moreover, several        intrahepatics were dilated and contained round and irregular filling        defects (later found to be stone and clot). One in particular which        appeared to arise from the right main but drain the left lobe was        diffusely dilated and contained numerous defects. This duct was not        accessed due to a tight angle and sticture. The bifurcation also        contined filling defects which the common duct was unremarkable. The        cystic stump/duct opacified. The biliary tree was swept with a 12 mm        balloon starting at the left intrahepatic duct(s) and right intrahepatic        duct(s). Numerous " clots and small black stones were swept from the        ducts, though again one duct with defects was not accessed. A 10 Fr by        22 cm transpapillary Johlin stent with no external flaps and no internal        flaps was placed 20 cm into the left hepatic duct. A 10 Fr by 15 cm        transpapillary Johlin stent with no external flaps and no internal flaps        was placed 14 cm into the right hepatic duct. Bile flowed through the        stents and the stents were in good position. The ventral pancreatic duct        and orifice were selectively not interrogated.                                                                                     Impression:          - Uncomplicated removal of paritally occluded, well                        positioned biliary stents from a patent biliary                        sphincterotomy                        - Impressive intrahepatic stone (small/black) and clot                        burden, mostly cleared by sweep and irrigation save for                        a dilated leftward (perhaps off the right main) duct                        which was not accessed due to stenosis                        - Multiple intrahepatic stenoses and stenosis involving                        the bifurcation consistent with known PSC                        - Successful placement of two 10F stents, one to the                        left and one to the right intrahepatics   Recommendation:      - General anesthesia recovery with return to the floor                        when appropriate                        - Continue and define a course of gram negative and                        anaerobic coverage                        - Variceal banding deferred until repeat ERCP not likely                        or they bleed                        - Repeat ERCP as scheduled later this month with                        consideration of cholangioscopy and electrohydraulic                         lithotripsy of the dilated leftward duct that was not                        accessed during this procedure (cholangioscopy is not                        appropriate in the setting of cholangitis)                        - All medications and diet may resume without delay                        - ^^^^^^^^^^PICC line was found to be flopping in a                        chamber^^^^^^^^^^^^; this will be communicated to the                        primary team                        - The findings and recommendations were discussed with                        the patient and their family

## 2021-06-10 NOTE — PROGRESS NOTES
Consult and Procedure Service - Procedure Note    Attending:Dr. Harden   Resident: none  Procedure: Diagnostic and therapeutic paracentesis  Indication: Tense ascites. Rule out SBP  Risk Assessment: Normal   Pre-procedure diagnosis: Tense ascites. PBC. Cirrhosis  Post-procedure diagnosis: same     The risks and benefits of the procedure were explained to Berta Nava who expressed understanding and opted to proceed.  Consent was obtained and placed in the chart.  A time out was performed.  An area of ascites was located and marked using ultrasound guidance in the right lower quadrant; the area was prepped and draped in the usual sterile fashion.  5 ml of 1% lidocaine was instilled and ascites located.  The  paracentesis catheter and needle were inserted under real-time guidance until ascites obtained then the needle removed and the catheter advanced.  The apparatus was connected to vacuum bottles and a total of 4550 ml of yellow colored fluid removed.   A specimen was sent for analysis. The catheter was withdrawn and the area dressed.  Patient tolerated the procedure well with no immediate complications.  Please contact the Consult and Procedure Service if any complications or concerns arise.     Gonzalo Harden MD on 6/10/2021 at 3:37 PM

## 2021-06-10 NOTE — ED NOTES
[facility]   ED Nurse to Floor Handoff     Berta AYE Nava is a 55 year old female who speaks English and lives alone,  in a home  They arrived in the ED by car from home    ED Chief Complaint: Fever    ED Dx;   Final diagnoses:   Fever and chills         Needed?: No    Allergies:   Allergies   Allergen Reactions     Diagnostic X-Ray Materials Hives     PATIENT HAD HIVE REACTION AFTER ADMINISTERING CT CONTRAST DYE.       Contrast Dye    .  Past Medical Hx:   Past Medical History:   Diagnosis Date     Ascites      Biliary cirrhosis (H)      Cholangitis, sclerosing      Cirrhosis of liver with ascites (H) 3/3/2021     Hearing loss of left ear     wears a hearing aide     History of low potassium      Hyperlipidemia      Hypertension      SBP (spontaneous bacterial peritonitis) (H) 4/30/2021     Sjogren's syndrome (H)      Ulcerative colitis (H)      Ulcerative pancolitis (H)       Baseline Mental status: WDL  Current Mental Status changes: at basesline    Infection present or suspected this encounter: cultures pending  Sepsis suspected: No  Isolation type: No active isolations  Patient tested for COVID 19 prior to admission: YES     Activity level - Baseline/Home:  Independent  Activity Level - Current:   Independent    Bariatric equipment needed?: No    In the ED these meds were given:   Medications   sodium chloride (PF) 0.9% PF flush 3 mL (has no administration in time range)   sodium chloride (PF) 0.9% PF flush 3 mL (3 mLs Intravenous Not Given 6/9/21 1746)   vancomycin (VANCOCIN) 750 mg in sodium chloride 0.9 % 250 mL intermittent infusion (has no administration in time range)   lidocaine 1 % 0.1-1 mL (has no administration in time range)   lidocaine (LMX4) cream (has no administration in time range)   sodium chloride (PF) 0.9% PF flush 3 mL (has no administration in time range)   sodium chloride (PF) 0.9% PF flush 3 mL (has no administration in time range)    methylPREDNISolone (MEDROL) tablet 32 mg (has no administration in time range)   diphenhydrAMINE (BENADRYL) capsule 50 mg (has no administration in time range)   0.9% sodium chloride BOLUS (0 mLs Intravenous Stopped 6/9/21 1829)   piperacillin-tazobactam (ZOSYN) 4.5 g vial to attach to  mL bag (0 g Intravenous Stopped 6/9/21 1830)   vancomycin 1250 mg in 0.9% NaCl 250 mL intermittent infusion 1,250 mg (1,250 mg Intravenous New Bag 6/9/21 1743)   methylPREDNISolone (MEDROL) tablet 32 mg (32 mg Oral Given 6/9/21 1912)       Drips running?  No    Home pump  No    Current LDAs  PICC Double Lumen 05/08/21 Right Brachial vein lateral (Active)   Number of days: 32       Incision/Surgical Site 04/13/21 Anterior;Right;Lower Abdomen (Active)   Number of days: 57       Labs results:   Labs Ordered and Resulted from Time of ED Arrival Up to the Time of Departure from the ED   COMPREHENSIVE METABOLIC PANEL - Abnormal; Notable for the following components:       Result Value    Sodium 126 (*)     Carbon Dioxide 18 (*)     Glucose 102 (*)     Bilirubin Total 2.2 (*)     Albumin 2.4 (*)     Alkaline Phosphatase 288 (*)     AST 57 (*)     All other components within normal limits   CBC WITH PLATELETS DIFFERENTIAL - Abnormal; Notable for the following components:    WBC 27.5 (*)     Hemoglobin 11.6 (*)     RDW 53.6 (*)     Absolute Neutrophil 25.9 (*)     Absolute Lymphocytes 0.6 (*)     All other components within normal limits   INR - Abnormal; Notable for the following components:    INR 1.46 (*)     All other components within normal limits   INR - Abnormal; Notable for the following components:    INR 1.65 (*)     All other components within normal limits   LACTATE FOR SEPSIS PROTOCOL   SARS-COV-2 (COVID-19) VIRUS RT-PCR   AMMONIA   PROCALCITONIN   LIPASE   PARTIAL THROMBOPLASTIN TIME   ROUTINE UA WITH MICROSCOPIC   PERIPHERAL IV CATHETER   PULSE OXIMETRY NURSING   CARDIAC CONTINUOUS MONITORING   NURSING DRAW AND HOLD    APPLY PNEUMATIC COMPRESSION DEVICE (PCD)   PERIPHERAL IV CATHETER   BLOOD CULTURE   BLOOD CULTURE   URINE CULTURE AEROBIC BACTERIAL       Imaging Studies:   Recent Results (from the past 24 hour(s))   XR Chest Port 1 View    Narrative    EXAM: XR CHEST PORT 1 VIEW  6/9/2021 4:11 PM      HISTORY: cough, fever    COMPARISON: X-ray 4/29/2021    FINDINGS: Single view of the chest. Right PICC tip projects over the  right atrium. No pneumothorax. No pleural effusion. Borderline  enlarged cardiac silhouette. No acute airspace opacities. Partial  visualization of ERCP tubing, better seen on an imaging from  5/10/2021. No aggressive osseous lesions.      Impression    IMPRESSION:  No acute airspace opacities.    I have personally reviewed the examination and initial interpretation  and I agree with the findings.    ASHLY AYERS MD   US Abdomen Limited (RUQ)    Narrative    EXAMINATION: Limited Abdominal Ultrasound, 6/9/2021 5:56 PM     COMPARISON: CT 5/8/2021 and ultrasound 5/7/2021    HISTORY: Elevated bili, fever, recent stents and cholangitis.    FINDINGS:   Fluid: Moderate volume of abdominal ascites.    Liver: Limited evaluation of the liver demonstrates heterogeneity  nodular and cirrhotic appearance measuring 12.1 cm.    Gallbladder: Largely distended gallbladder with biliary sludge. With  mild nonspecific wall thickening and pericholecystic fluid. No  positive sonographic Javier's sign or evidence for cholelithiasis.    Bile Ducts: Biliary stent is noted.  The common bile duct distal to  the stent measures 5.5 mm in diameter.    Pancreas: Visualized portions of the head and body of the pancreas are  unremarkable.     Kidney: The right kidney measures 12.1 cm long. There is no  hydronephrosis or hydroureter, no shadowing renal calculi, cystic  lesion or mass.       Impression    IMPRESSION:   1.  Largely distended gallbladder with biliary sludge and mild  nonspecific wall thickening in the setting of liver  "disease and  ascites. No focal tenderness to suggest acute cholecystitis.  2.  Limited evaluation of the liver demonstrates cirrhotic morphology.  3.  Moderate volume of ascites.    I have personally reviewed the examination and initial interpretation  and I agree with the findings.    KADIE PRITCHARD MD       Recent vital signs:   BP 98/63   Pulse 77   Temp 100.3  F (37.9  C) (Oral)   Resp 20   Ht 1.651 m (5' 5\")   SpO2 98%   BMI 19.70 kg/m      Levy Coma Scale Score: 15 (06/09/21 1600)       Cardiac Rhythm: Tachycardia and Other  Pt needs tele? No  Skin/wound Issues: None    Code Status: Full Code    Pain control: good    Nausea control: pt had none    Abnormal labs/tests/findings requiring intervention: see results    Family present during ED course? No   Family Comments/Social Situation comments:     Tasks needing completion: None    Judy Coe, RN  [phone] [facility]    "

## 2021-06-10 NOTE — PROGRESS NOTES
Home Infusion  Berta is currently on service with [facility] for IV therapy.  Her home regimen is IV Ceftriaxone 2gm q24hrs.  Patient receives home nursing through [facility].  Will continue to follow until dc for any changes or additional needs.    ADRIANA Velez  [facility] Nurse Liaison   [email or web address]  Cell: [phone] M-F  [facility] Main: [phone]

## 2021-06-10 NOTE — ANESTHESIA POSTPROCEDURE EVALUATION
Patient: Berta Nava    Procedure(s):  ENDOSCOPIC RETROGRADE CHOLANGIOPANCREATOGRAPHY, WITH biliary stent exchange, stone removal    Diagnosis:Cholangitis [K83.09]  Diagnosis Additional Information: No value filed.    Anesthesia Type:  General    Note:  Disposition: Inpatient   Postop Pain Control: Uneventful            Sign Out: Well controlled pain   PONV: No   Neuro/Psych: Uneventful            Sign Out: Acceptable/Baseline neuro status   Airway/Respiratory: Uneventful            Sign Out: Acceptable/Baseline resp. status   CV/Hemodynamics: Uneventful            Sign Out: Acceptable CV status; No obvious hypovolemia; No obvious fluid overload   Other NRE: NONE   DID A NON-ROUTINE EVENT OCCUR? No           Last vitals:  Vitals:    06/10/21 0634 06/10/21 1235 06/10/21 1600   BP: 95/58 98/64 103/65   Pulse: 64 72 63   Resp: 18 16 14   Temp: 35.4  C (95.8  F)  35.5  C (95.9  F)   SpO2: 99%  100%       Last vitals prior to Anesthesia Care Transfer:  CRNA VITALS  6/10/2021 1511 - 6/10/2021 1611      6/10/2021             NIBP:  92/62    Ht Rate:  64    SpO2:  100 %          Electronically Signed By: GARRETT NELSON MD  Alayna 10, 2021  4:12 PM

## 2021-06-10 NOTE — BRIEF OP NOTE
[facility]    Brief Operative Note    Pre-operative diagnosis: Cholangitis [K83.09]  Post-operative diagnosis Same, choledocholithiasis, biliary clots    Procedure: Procedure(s):  ENDOSCOPIC RETROGRADE CHOLANGIOPANCREATOGRAPHY, WITH biliary stent exchange, stone removal  Surgeon: Surgeon(s) and Role:     * Woodrow Lott MD - Primary  Anesthesia: General   Estimated blood loss: Minimal  Drains: None  Specimens: * No specimens in log *  Findings:   Uncomplicated removal of biliary stents from patent biliary sphincterotomy; heavy intrahepatic stone and clot burden, mostly cleared save for one leftward duct obstructed by stenosis likely to require cholangioscopy (not to be performed with cholangitis); uncomplicated biliary irrigation and stent replacement    Complications: None.  Implants:   Implant Name Type Inv. Item Serial No.  Lot No. LRB No. Used Action   STENT JOHLIN PANCREA WEDGE [id number] WINTRO [id number] Stent STENT JOHLIN PANCREA WEDGE [id number] WINTRO [id number]  [employer] [id number] N/A 1 Explanted   STENT JOHLIN PANCREA WEDGE [id number] W/INTRO Stent STENT JOHLIN PANCREA WEDGE [id number] W/INTRO  [employer] [id number] N/A 1 Explanted   STENT JOHLIN PANCREA WEDGE [id number] W/INTRO - [id number] Stent STENT JOHLIN PANCREA WEDGE [id number] W/INTRO  [employer] [id number] N/A 1 Implanted   STENT JOHLIN PANCREA WEDGE [id number] W/INTRO - [id number] Stent STENT JOHLIN PANCREA WEDGE [id number] W/INTRO  [employer] [id number] N/A 1 Implanted     Recommendations: Continue antibiotics and define course, diet and medications may resume, full note to follow

## 2021-06-10 NOTE — PROGRESS NOTES
Physician Attestation   I, James Long, was present with the medical/[place] student who participated in the service and in the documentation of the note.  I have verified the history and personally performed the physical exam and medical decision making.  I agree with the assessment and plan of care as documented in the note.          James Long MD  Date of Service (when I saw the patient): 6/10/21    [facility]    Progress Note - Gold 8 Service        Date of Admission:  6/9/2021    Assessment & Plan Berta Nava is a 55 year old female with a pertinent past medical history of primary sclerosing cholangitis c/b cirrhosis, ascites and esophageal varices, biliary strictures s/p stenting (1/6/2021), ulcerative colitis, hx of SBP who presented to the ED with fever and chills, admitted 6/9 with concern for cholangitis.      Sepsis 2/2 Suspected Acute Cholangitis  Hx of Strep mitis Bacteremia  Hx of Hepatic Abscess   Hx of SBP   Presenting with acute fever and chills occurring shortly after home health nurse noticed elevated WBC.  WBC now elevated to 22.8  DDX includes cholangitis, bacteremia, SBP, hepatic abscess.  Of note, after her ERCP 5/5, she was started on ceftriaxone and metronidazole until next ERCP  - Cholangitis is possible, during ERCP (5/5), was found to have purulence concerning for cholangitis. Biliary strictures from her primary sclerosing cholangitis place her at risk.  Fevers and elevated WBC are consistent, as is US showing distended gallbladder with biliary sludge as well as mild nonspecific wall thickening and pericholecystic fluid. However, pt denies abdominal pain and has no RUQ tenderness on PE, pointing away  - SBP possible given significant ascites, with fever and WBC as well as hx of SBP, but - Hepatic abscess is possible, she was noted to have a possible ~2cm abscess/fluid collection off the left intrahepatic which was unable to be  accessed. Again, fever and elevated WBC are consistent while absence of RUQ tenderness points away  - Bacteremia is less likely.  Recent admission 4/29 - 5/11 for septic shock from strep mitis bacteremia and SBP.   -GI Panc/bili consulted, appreciate recommendations   -Obtained CT abdomen pelvis w/ contrast (pre-medicated for contrast allergy per 12 hour protocol, and pt tolerated well)   -Continue PTD vancomycin and Zosyn   -continue following blood cultures (NGTD)      - diagnostic and therapeutic paracentesis 6/10 to assess for SBP and for sx relief   - <250 WBCs in ascitic fluid today points away from SBP   - Gram stain shows no organisms   - cultures pending   - SAAG is 1.7, c/w ascites from portal htn  - ERCP 6/10 report pending     Primary Sclerosing Cholangitis c/b Cirrhosis, Ascites, and Grade III Esophageal Varices   S/p ERCP (5/5, 5/10) with Biliary Stent Exchanged x2 and Debris Clearance   - elevated alk phos on labs is consistent.  - currently undergoing transplant work up.  MELD 24  - She is scheduled for an outpatient paracentesis 6/11 for her recurrent ascites (has weekly leann) with plans to follow up with the hepatology clinic 6/21.   - Moving paracentesis up to inpt, for both therapeutic and diagnostic purposes (see above)  -Hold carvedilol for above sepsis   -Plans for ciprofloxacin for SBP prophylaxis once patient completes treatment for hepatic abscess.      Hepatic abcess  Noted during last ERCP.  Per GI consult, unclear if communicating with biliary tree, and may need IR drainage if not improved.   - per GI recommendations inpt ID consult placed to guide future abx and comment about transplant candidacy    Hyponatremia -   Sodium 126 on 6/09, improved to 130 on 6/10, baseline WNLs. C/w hypervolemia from cirrhosis and ascites.  The evidence shows that correcting sodium in cirrhotic pt does not positively impact mortality, and pt not sx from this currently.  - S/p 2Ls NS in ED, avoid further IV  fluid as able  - holding spironolactone and lasix     Ulcerative Colitis - Continue mesalamine while inpatient.     Immunization status  - due for 2nd dose of HAV and HBV  - give at time of discharge     HLD - Continue simvastatin 20mg nightly        Diet: NPO per Anesthesia Guidelines for Procedure/Surgery Except for: Meds, Ice Chips    DVT Prophylaxis: Pneumatic Compression Devices  Jurado Catheter: not present  Code Status: Full Code           Disposition Plan   Expected discharge: 2 - 3 days, recommended to prior living arrangement once antibiotic plan established.  Entered: Cesar Hatch 06/10/2021, 11:33 AM       The patient's care was discussed with the Attending Physician, Dr. Long.    Cesar Hatch  Medical Student  [address]  Please see sign in/sign out for up to date coverage information  ______________________________________________________________________    Interval History      Per pt, on Tues, her home health nurse noticed an elevated WBC to 14.3, and on Wed she contacted her ID doctor. At the time of their conversation, she had no fever/chills.  Her ID doctor told her to come for a temperature of 100.4, and a couple hours later, she had chills and a fever to 101.1, so she came to the ED.  She didn't have any other sx.  Denies any abdominal pain or mental fogginess.  No hx of HE.    Today, she is feeling about her baseline.  She notes that her recurrent ascites are uncomfortable. Sometimes, they can make breathing uncomfortable but she is able to breathe easily and comfortably this morning. She's had ERCPs in the past and tolerated them well.  She got her CT this morning, after being premedicated for a hx of reactions to contrast.  She denies any allergic sx (itching, tingling, chest tightness, SOB).   Denies any melena, hematochezia, tenesmus.  Has not had a Sjogrens flair in years.    Data reviewed today: I reviewed all medications, new  labs and imaging results over the last 24 hours. I personally reviewed the US from 6/9 showing largely distended gallbladder with biliary sludge and mild  nonspecific wall thickening in the setting of liver disease and  ascites. No focal tenderness to suggest acute cholecystitis.  2.  Limited evaluation of the liver demonstrates cirrhotic morphology.  3.  Moderate volume of ascites.    CT from today, which shows: Cirrhotic liver with multifocal irregularity and dilation of the  biliary ducts which is improved in some locations, and unchanged in  others. No apparent progression of biliary dilation. No new cystic  collections.  2. Sequela of portal hypertension with large volume ascites and  numerous portosystemic collaterals.  3. Dilated gallbladder similar to prior. Intraluminal air presumably  related to stents.  4. Unchanged periaortic lymphadenopathy dating back to 10/27/2020.    Physical Exam   Vital Signs: Temp: 95.8  F (35.4  C) Temp src: Oral BP: 95/58 Pulse: 64   Resp: 18 SpO2: 99 % O2 Device: None (Room air)    Weight: 154 lbs 4.8 oz  General Appearance: Thin woman, lying in bed, in NAD  HEENT: no scleral icterus noted  Respiratory: CTAB  Cardiovascular: S1, S2, RRR, NMRG appreciated  GI: Distended, firm but not rigid. Mildly distended epigastric veins. Tympanic to percussion. Fluid wave appreciated.  Nontender.  Javier's sign negative.  BS+  Skin: Tanned, which she says is her baseline.  No jaundice, no palmar erythema, no telangectasias noted  Other: mild, nonpitting bilateral LE edema    Data   Recent Labs   Lab 06/11/21  1134 06/10/21  1050 06/10/21  0702 06/09/21 2007 06/09/21  1527   WBC 21.5*  --  22.8*  --  27.5*   HGB 9.8*  --  9.8*  --  11.6*   MCV 94  --  94  --  96     --  270  --  431   INR  --   --   --  1.65* 1.46*   *  --  130*  --  126*   POTASSIUM 3.7  --  4.1  --  4.7   CHLORIDE 106  --  105  --  100   CO2 18*  --  17*  --  18*   BUN 33*  --  22  --  21   CR 0.79  --  0.59   --  0.64   ANIONGAP 8  --  8  --  8   MARIELENA 8.4*  --  8.4*  --  8.9   *  --  114*  --  102*   ALBUMIN 2.6* 2.5* 2.1*  --  2.4*   PROTTOTAL 6.0*  --  6.0*  --  7.2   BILITOTAL 1.1  --  1.7*  --  2.2*   ALKPHOS 231*  --  234*  --  288*   ALT 22  --  20  --  23   AST 48*  --  36  --  57*   LIPASE  --   --   --   --  203      Last Comprehensive Metabolic Panel:  Sodium   Date Value Ref Range Status   06/10/2021 130 (L) 133 - 144 mmol/L Final     Potassium   Date Value Ref Range Status   06/10/2021 4.1 3.4 - 5.3 mmol/L Final     Chloride   Date Value Ref Range Status   06/10/2021 105 94 - 109 mmol/L Final     Carbon Dioxide   Date Value Ref Range Status   06/10/2021 17 (L) 20 - 32 mmol/L Final     Anion Gap   Date Value Ref Range Status   06/10/2021 8 3 - 14 mmol/L Final     Glucose   Date Value Ref Range Status   06/10/2021 114 (H) 70 - 99 mg/dL Final     Urea Nitrogen   Date Value Ref Range Status   06/10/2021 22 7 - 30 mg/dL Final     Creatinine   Date Value Ref Range Status   06/10/2021 0.59 0.52 - 1.04 mg/dL Final     GFR Estimate   Date Value Ref Range Status   06/10/2021 >90 >60 mL/min/[1.73_m2] Final     Comment:     Non  GFR Calc  Starting 12/18/2018, serum creatinine based estimated GFR (eGFR) will be   calculated using the Chronic Kidney Disease Epidemiology Collaboration   (CKD-EPI) equation.       Calcium   Date Value Ref Range Status   06/10/2021 8.4 (L) 8.5 - 10.1 mg/dL Final     Bilirubin Total   Date Value Ref Range Status   06/10/2021 1.7 (H) 0.2 - 1.3 mg/dL Final     Alkaline Phosphatase   Date Value Ref Range Status   06/10/2021 234 (H) 40 - 150 U/L Final     ALT   Date Value Ref Range Status   06/10/2021 20 0 - 50 U/L Final     AST   Date Value Ref Range Status   06/10/2021 36 0 - 45 U/L Final     CBC RESULTS:   Recent Labs   Lab Test 06/10/21  0702   WBC 22.8*   RBC 3.25*   HGB 9.8*   HCT 30.6*   MCV 94   MCH 30.2   MCHC 32.0   RDW 15.4*        UA bland  Ascites fluid  yellow, clear, 137 WBC, glucose 118

## 2021-06-10 NOTE — PLAN OF CARE
AVSS. PICC heparin licked between ABX.  CHG shower done.  Paracentesis done, 4500ml removed, fluid sent for labs.  To OR at 1215 for ERCP.  Report called to 3C.

## 2021-06-11 LAB
ALBUMIN SERPL-MCNC: 2.6 G/DL (ref 3.4–5)
ALP SERPL-CCNC: 231 U/L (ref 40–150)
ALT SERPL W P-5'-P-CCNC: 22 U/L (ref 0–50)
ANION GAP SERPL CALCULATED.3IONS-SCNC: 8 MMOL/L (ref 3–14)
AST SERPL W P-5'-P-CCNC: 48 U/L (ref 0–45)
BACTERIA SPEC CULT: NO GROWTH
BILIRUB SERPL-MCNC: 1.1 MG/DL (ref 0.2–1.3)
BUN SERPL-MCNC: 33 MG/DL (ref 7–30)
CALCIUM SERPL-MCNC: 8.4 MG/DL (ref 8.5–10.1)
CHLORIDE SERPL-SCNC: 106 MMOL/L (ref 94–109)
CO2 SERPL-SCNC: 18 MMOL/L (ref 20–32)
CREAT SERPL-MCNC: 0.79 MG/DL (ref 0.52–1.04)
ERYTHROCYTE [DISTWIDTH] IN BLOOD BY AUTOMATED COUNT: 15.4 % (ref 10–15)
GFR SERPL CREATININE-BSD FRML MDRD: 83 ML/MIN/{1.73_M2}
GLUCOSE SERPL-MCNC: 146 MG/DL (ref 70–99)
HCT VFR BLD AUTO: 30.4 % (ref 35–47)
HGB BLD-MCNC: 9.8 G/DL (ref 11.7–15.7)
Lab: NORMAL
MCH RBC QN AUTO: 30.3 PG (ref 26.5–33)
MCHC RBC AUTO-ENTMCNC: 32.2 G/DL (ref 31.5–36.5)
MCV RBC AUTO: 94 FL (ref 78–100)
PLATELET # BLD AUTO: 319 10E9/L (ref 150–450)
POTASSIUM SERPL-SCNC: 3.7 MMOL/L (ref 3.4–5.3)
PROT SERPL-MCNC: 6 G/DL (ref 6.8–8.8)
RBC # BLD AUTO: 3.23 10E12/L (ref 3.8–5.2)
SODIUM SERPL-SCNC: 132 MMOL/L (ref 133–144)
SPECIMEN SOURCE: NORMAL
VANCOMYCIN SERPL-MCNC: 16.8 MG/L
WBC # BLD AUTO: 21.5 10E9/L (ref 4–11)

## 2021-06-11 PROCEDURE — 80202 ASSAY OF VANCOMYCIN: CPT | Performed by: INTERNAL MEDICINE

## 2021-06-11 PROCEDURE — 250N000013 HC RX MED GY IP 250 OP 250 PS 637: Performed by: INTERNAL MEDICINE

## 2021-06-11 PROCEDURE — 80053 COMPREHEN METABOLIC PANEL: CPT | Performed by: INTERNAL MEDICINE

## 2021-06-11 PROCEDURE — 99233 SBSQ HOSP IP/OBS HIGH 50: CPT | Performed by: INTERNAL MEDICINE

## 2021-06-11 PROCEDURE — 99254 IP/OBS CNSLTJ NEW/EST MOD 60: CPT | Performed by: INTERNAL MEDICINE

## 2021-06-11 PROCEDURE — 250N000011 HC RX IP 250 OP 636: Performed by: INTERNAL MEDICINE

## 2021-06-11 PROCEDURE — 85027 COMPLETE CBC AUTOMATED: CPT | Performed by: INTERNAL MEDICINE

## 2021-06-11 PROCEDURE — 120N000002 HC R&B MED SURG/OB UMMC

## 2021-06-11 PROCEDURE — 86481 TB AG RESPONSE T-CELL SUSP: CPT | Performed by: INTERNAL MEDICINE

## 2021-06-11 PROCEDURE — 36592 COLLECT BLOOD FROM PICC: CPT | Performed by: INTERNAL MEDICINE

## 2021-06-11 PROCEDURE — 258N000003 HC RX IP 258 OP 636: Performed by: INTERNAL MEDICINE

## 2021-06-11 RX ADMIN — PIPERACILLIN SODIUM AND TAZOBACTAM SODIUM 3.38 G: 3; .375 INJECTION, POWDER, LYOPHILIZED, FOR SOLUTION INTRAVENOUS at 16:30

## 2021-06-11 RX ADMIN — Medication 5 ML: at 08:27

## 2021-06-11 RX ADMIN — THERA TABS 1 TABLET: TAB at 09:47

## 2021-06-11 RX ADMIN — VANCOMYCIN HYDROCHLORIDE 750 MG: 10 INJECTION, POWDER, LYOPHILIZED, FOR SOLUTION INTRAVENOUS at 11:12

## 2021-06-11 RX ADMIN — Medication 5 ML: at 04:02

## 2021-06-11 RX ADMIN — PIPERACILLIN SODIUM AND TAZOBACTAM SODIUM 3.38 G: 3; .375 INJECTION, POWDER, LYOPHILIZED, FOR SOLUTION INTRAVENOUS at 09:47

## 2021-06-11 RX ADMIN — URSODIOL 300 MG: 300 CAPSULE ORAL at 09:46

## 2021-06-11 RX ADMIN — VANCOMYCIN HYDROCHLORIDE 750 MG: 10 INJECTION, POWDER, LYOPHILIZED, FOR SOLUTION INTRAVENOUS at 21:07

## 2021-06-11 RX ADMIN — SIMVASTATIN 20 MG: 20 TABLET, FILM COATED ORAL at 21:09

## 2021-06-11 RX ADMIN — PIPERACILLIN SODIUM AND TAZOBACTAM SODIUM 3.38 G: 3; .375 INJECTION, POWDER, LYOPHILIZED, FOR SOLUTION INTRAVENOUS at 22:35

## 2021-06-11 RX ADMIN — MESALAMINE 2400 MG: 1.2 TABLET, DELAYED RELEASE ORAL at 09:45

## 2021-06-11 RX ADMIN — CALCIUM CARBONATE 600 MG (1,500 MG)-VITAMIN D3 400 UNIT TABLET 1 TABLET: at 09:45

## 2021-06-11 RX ADMIN — PIPERACILLIN SODIUM AND TAZOBACTAM SODIUM 3.38 G: 3; .375 INJECTION, POWDER, LYOPHILIZED, FOR SOLUTION INTRAVENOUS at 04:02

## 2021-06-11 RX ADMIN — URSODIOL 300 MG: 300 CAPSULE ORAL at 21:09

## 2021-06-11 ASSESSMENT — ACTIVITIES OF DAILY LIVING (ADL)
ADLS_ACUITY_SCORE: 13
ADLS_ACUITY_SCORE: 13
DEPENDENT_IADLS:: INDEPENDENT
ADLS_ACUITY_SCORE: 13

## 2021-06-11 NOTE — CONSULTS
"[facility]    Transplant Infectious Diseases Inpatient Consultation      Berta Nava MRN# [id number]   YOB: 1965 Age: 55 year old   Date of Admission and time: 6/9/2021  3:24 PM     Reason for consult: I was asked by Dr. Long to evaluate this patient for liver abscess and liver transplant candidacy.             Recommendations:   1. If blood cx drawn on 6/9/2021 are negative by 5:00 PM today 6/11/2021, please:  - discontinue vancomycin and zosyn   - discontinue the PICC line  - start augmentin 875 mg bid end date 6/16/2021.  - starting 6/17/2021 may receive secondary SBP ppx with cipro 250 mg daily if felt necessary.   2. No ID-related issues that would prevent this patient from being activated on the transplant list. Please see \"Active Problems and Infectious Diseases Issues\" for more details.   3. Will need complete pre-liver transplant serologies (CMV, EBV, etc....) per liver transplant protocol.   4. This patient is a candidate for prevnar followed by pneumovax 8 weeks later and shingrix series. Vaccines series are best completed at least 2 weeks before transplant otherwise they can be given after transplant with questionable response. Vaccinations should not delay the OLT, however.   5. Follow up with ID only as needed.     Dr. Bonilla is available over the weekend for questions. Dr. Banerjee will assume the patient's care on Monday if the patient is still inpatient.         Summary of Presentation:   This patient is a 55 year old female with PBS, UC, liver cirrhosis complicated by esophageal varices and ascites with S mitis SBP and BSI in 4/2021.     Admitted with fever and ascending cholangitis.         Active Problems and Infectious Diseases Issues:   1. Sepsis vs. SIRS   2. Ascending cholangitis.   3. ?liver abscess vs dilated intrahepatic biliary cyst.   This patient presented with ascending cholangitis while on IV ABx.   I do not believe that this represents " ABx failure, rather it is due to the nature of the underlying disease (PBS). This may recur with or without ABx since the patient is high risk for biliary obstruction anyway.     I reviewed the CT a/p with the radiologist, the liver lesion in question has the same density as the ascitic fluid and other cystic liver lesions and it is probably related to the patient's underlying PBS.   The ERCP findings were reviewed with Oren; while the findings of blood clots, debris and stones put the patient at higher risk for recurrent cholangitis and infection, this may occurrecur at any time with and without ABx. On the most recent ERCP there was no purulence and the lesion/cyst in question was not seen. There is, however a small duct that is still obstructed and could not be released.     Given all above, and since the patient has been on IV ABx for six weeks (starting 4/29/2021), I feel that we should be able to discontinue IV ABx and the PICC line if the blood cx from 6/9/2021 remain negative for 48 hr. We can give an additional short course of PO augmentin (that will conclude a 7-week course) keeping in mind that obstructive ascending cholangitis may recur anyway.    The ascitic fluid is not c/w SBP this time.     4. Pre-liver transplant evaluation.   5. Indeterminate QuantiFeron.    The liver transplant can be actually considered therapeutic, not only for the liver cirrhosis and its complications (ascites, etc.....) but also for the underlying biliary stenoses and potential liver abscess.     No ID-related issues that would keep the patient from proceeding with transplantation.     The indeterminate QuantiFERON was due to low reaction to mitogen likely due to the patient's underlying liver disease. This patient is low risk for TB, CXR unremarkable; no further evaluations are necessary.     The patient will need the typical pre-liver transplant labs (CMV, EBV, HSV, VZV, etc... serologies).        Old Problems and  Infectious Diseases Issues:   1. SBP and BSI with S mitis 4/29/2021; on IV ABx since 4/29/2021 for possible association with liver abscess. Please see above.     Other Infectious Disease issues include:  - PCP prophylaxis: will need bactrim for PJP ppx.   - Immunization status: finished the Moderna COVID-19 vaccine on 4/15/2021. Has not received pneumovax or prevnar. Also has not received shingrix.   - Gamma globulin status: ?      Thank you very much Dr. Long for involving me in the care of Mrs. Berta Nava. Please do not hesitate to call me for any question.     Attestation:  I interviewed the patient and obtained history from the patient, and by reviewing the patient's chart including outside records, microbiological data, and radiological data. All data are summarized in this notes.  Forest Dasilva MD  [facility]  Contact information available via [facility] Paging/Directory    06/11/2021             History of Present Illness:   This patient is a 55 year old female with PBS, UC, liver cirrhosis complicated by esophageal varices and ascites.   On 4/29/2021 was admitted with severe sepsis and was found to have S mitis BSI in 4/2021. The ascitic fluid at that time was c/w SBP though with no growth.    During the same admission she was found to have obstructive ascending cholangitis and underwent ERCP on 5/5/2021 and 5/10/2021 with stents exchanged due to obstructions.   The ERCP on 5/10/2021 revealed stones, debris and purulence. Also revealed a 2 cm cyst proximal to the left intrahepatic duct suspicious for either abscess or dilated intrahepatic biliary duct. IR could not drain.   The patient was started on vanomycin and zosyn then ceftriaxone and flagyl.  The course of ABx was extended till pre-planned repeat ERCP is done.     On 6/9/2021 the patient's labs showed rising WBC, she then started to spike fever and experience chills. She denied N/V, diarrhea, or  abdominal pain.   She was admitted to the hospital, zosyn and vancomycin were substituted for ceftriaxone and flagyl.   She underwent ERCP with exchange of obstructed stents with abundant stones and blood clots.     The patient feels back to baseline today. No complaints.      Exposure History:  Was born in [place], lives in [place], [place] with her . Has a dog.   The only outdoors activities are gardening.   She traveled to [place] endemic states ([place] in the past. Traveled to [place] in the past.   She works in IT.   No institutionalization and no known TB exposure.               Review of Systems:      As mentioned in the HPI otherwise negative by reviewing constitutional symptoms, central and peripheral neurological systems, respiratory system, cardiac system, GI system,  system, musculoskeletal, skin, allergy, and lymphatics.                  Past Medical History:     Past Medical History:   Diagnosis Date     Ascites      Biliary cirrhosis (H)      Cholangitis, sclerosing      Cirrhosis of liver with ascites (H) 3/3/2021     Hearing loss of left ear     wears a hearing aide     History of low potassium      Hyperlipidemia      Hypertension      SBP (spontaneous bacterial peritonitis) (H) 4/30/2021     Sjogren's syndrome (H)      Ulcerative colitis (H)      Ulcerative pancolitis (H)             Past Surgical History:     Past Surgical History:   Procedure Laterality Date     ENDOSCOPIC RETROGRADE CHOLANGIOPANCREATOGRAPHY, EXCHANGE TUBE/STENT N/A 05/05/2021    Procedure: ENDOSCOPIC RETROGRADE CHOLANGIOPANCREATOGRAPHY WITH STENT EXCHANGE, STONE EXTRACTION, AND DILATION;  Surgeon: Gregory Gabriel MD;  Location: [facility] OR     ENDOSCOPIC RETROGRADE CHOLANGIOPANCREATOGRAPHY, EXCHANGE TUBE/STENT N/A 5/10/2021    Procedure: ENDOSCOPIC RETROGRADE CHOLANGIOPANCREATOGRAPHY with biliary dilation, stone removal, stent exchange;  Surgeon: Gregory Gabriel MD;  Location: [facility] OR     ENDOSCOPIC RETROGRADE  CHOLANGIOPANCREATOGRAPHY, EXCHANGE TUBE/STENT N/A 6/10/2021    Procedure: ENDOSCOPIC RETROGRADE CHOLANGIOPANCREATOGRAPHY, WITH biliary stent exchange, stone removal;  Surgeon: Woodrow Lott MD;  Location: [facility] OR     GYN SURGERY      bilat fallopian tubes and ovaries removed     PICC DOUBLE LUMEN PLACEMENT Right 05/08/2021    42cm (2cm external), Lateral brachial vein            Social History:     Social History     Tobacco Use     Smoking status: Never Smoker     Smokeless tobacco: Never Used   Substance Use Topics     Alcohol use: No     Comment: Last drink was 2017            Family History:   I have reviewed this patient's family history  Family History   Problem Relation Age of Onset     Cancer Mother         angiosarcoma     Coronary Artery Disease Early Onset Father         MI age 46     Heart Transplant Father      Liver Disease No family hx of      Ulcerative Colitis No family hx of      Crohn's Disease No family hx of             Immunizations:     Immunization History   Administered Date(s) Administered     COVID-19,PF,Moderna 03/18/2021, 04/15/2021     HepA-Adult 05/12/2021     HepB-Adult 05/11/2021            Allergies:     Allergies   Allergen Reactions     Diagnostic X-Ray Materials Hives     PATIENT HAD HIVE REACTION AFTER ADMINISTERING CT CONTRAST DYE.       Contrast Dye              Medications:   Medications that Require Transfusion:     Scheduled Medications:     calcium carbonate 600 mg-vitamin D 400 units  1 tablet Oral Daily     heparin lock flush  5-10 mL Intracatheter Q24H     mesalamine  2,400 mg Oral Daily with breakfast     multivitamin, therapeutic   Oral Daily     piperacillin-tazobactam  3.375 g Intravenous Q6H     simvastatin  20 mg Oral At Bedtime     sodium chloride (PF)  3 mL Intravenous Q8H     ursodiol  300 mg Oral BID     vancomycin (VANCOCIN) IV  750 mg Intravenous Q12H               Physical Exam:   Temp: 96.9  F (36.1  C) Temp src: Temporal BP: 100/82 Pulse: 82    Resp: 16 SpO2: 99 % O2 Device: None (Room air)      Wt Readings from Last 4 Encounters:   06/10/21 70 kg (154 lb 4.8 oz)   05/10/21 53.7 kg (118 lb 6.4 oz)   05/03/21 76.7 kg (169 lb 3.2 oz)   02/22/21 72.6 kg (160 lb)     Constitutional: awake, alert, cooperative, no apparent distress and appears at stated age, well nourished.   Head, ENT, Eyes, and Neck: Normocephalic, sinuses non-tender to palpation, external ears without lesions, moist buccal mucosa without oral thrush or ulcers, tonsils without swelling, erythema, or exudate, no tenderness palpating teeth, good dentition, gums without necrosis or abscesses.   PERRL, EOMI, pink conjunctivae, non-icteric sclera.   Neck supple without rigidity, no cervical/axillary/inguinal LA bilaterally.    Neurologic: Patient is moving all extremities without focal deficit, no focal sensory loss.   Lungs: CTA bilaterally, no accessory muscle use, no dullness to percussion and no abnormal tactile fremitus.   CVS: RRR, normal S1/S2, no murmur, PMI was not displaced.   Abdomen: non-tender, distended, no masses, no bruit, shifting dullness, normal BS.   Extremities: no pitting edema of bilateral lower extremities, no ulcers, normal ROM of all joints, no swelling or erythema of any of joints and no tenderness to palpation.   Skin: no induration, fluctuation or discharge at the PICC line site in the RUE, and no rash            Data:   No results found for: ACD4    Inflammatory Markers    Recent Labs   Lab Test 06/08/21  1602 06/01/21  0915 05/25/21  1455 05/21/21  1500 05/18/21  1010   CRP 23.1* 16.1* 14.3* 16.1* 21.4*       Immune Globulin Studies   No lab results found.    Metabolic Studies       Recent Labs   Lab Test 06/10/21  0702 06/09/21  1527 06/08/21  1602 06/01/21  0915 05/25/21  1455 05/21/21  1500 04/30/21  0455 04/30/21  0455 04/29/21  1840 04/29/21  0942 05/16/18  0650 05/16/18  0650 05/14/18  0653 05/14/18  0653 05/13/18  0935 05/13/18  0935   * 126* 129* 130*  132* 135   < > 129*  --  127*   < > 138   < > 135  --  131*   POTASSIUM 4.1 4.7 4.2 3.9 4.0 3.6   < > 4.7  --  5.3   < > 3.8   < > 3.1*   < > 2.1*   CHLORIDE 105 100 101 104 106 106   < > 102  --  98   < > 109   < > 102  --  95   CO2 17* 18* 19* 21 19* 22   < > 17*  --  19*   < > 19*   < > 23  --  25   ANIONGAP 8 8 9 5 7 7   < > 10  --  10   < > 10   < > 10  --  11   BUN 22 21 24 20 15 14   < > 73*  --  76*   < > 7   < > 10  --  13   CR 0.59 0.64 0.62 0.59 0.64 0.61   < > 1.69*  --  2.28*   < > 0.60   < > 0.83  --  0.62   GFRESTIMATED >90 >90 >90 >90 >90 >90   < > 33*  --  23*   < > >90   < > 71  --  >90   * 102* 86 130* 109* 91   < > 196*  --  71   < > 73   < > 80  --  121*   A1C  --   --   --   --   --   --   --   --  4.3  --   --   --   --   --   --   --    MARIELENA 8.4* 8.9 8.6 8.8 8.2* 8.6   < > 9.3  --  10.3*   < > 8.4*   < > 8.0*  --  8.5   PHOS  --   --   --   --   --   --   --   --   --   --   --  2.6  --   --   --   --    MAG  --   --   --   --   --   --   --   --   --   --   --   --   --  2.7*  --  1.9   LACT  --   --   --   --   --   --   --  1.1  --  1.7  --   --   --   --   --  1.4    < > = values in this interval not displayed.       Hepatic Studies    Recent Labs   Lab Test 06/10/21  1050 06/10/21  0702 06/09/21  1527 06/08/21  1602 06/01/21  0915 05/25/21  1455 05/21/21  1500   BILITOTAL  --  1.7* 2.2* 1.5* 1.7* 1.5* 2.0*   ALKPHOS  --  234* 288* 285* 275* 307* 321*   ALBUMIN 2.5* 2.1* 2.4* 2.4* 2.5* 2.4* 2.9*   AST  --  36 57* 47* 43 42 38   ALT  --  20 23 25 24 27 30       Pancreatitis testing    Recent Labs   Lab Test 06/09/21  1527 05/13/18  0935   LIPASE 203 142       Hematology Studies      Recent Labs   Lab Test 06/10/21  0702 06/09/21  1527 06/08/21  1602 06/01/21  0915 05/25/21  1455 05/21/21  1500 05/12/21  0542 05/12/21  0542 05/11/21  0724 05/10/21  0629   WBC 22.8* 27.5* 14.3* 11.5* 8.8 8.1   < > 14.5* 18.3* 13.3*   ANEU 22.0* 25.9*  --  9.3*  --   --   --  11.3* 16.3* 10.0*   ALYM  0.8 0.6*  --  0.9  --   --   --  1.7 0.9 1.7   BRIGIDO 0.0 0.8  --  0.9  --   --   --  1.2 0.9 1.2   AEOS 0.0 0.0  --  0.2  --   --   --  0.2 0.0 0.2   HGB 9.8* 11.6* 11.2* 11.3* 10.9* 10.6*   < > 9.6* 9.7* 11.5*   HCT 30.6* 36.6 34.0* 34.9* 33.3* 33.1*   < > 29.2* 30.4* 34.6*    431 373 386 301 242   < > 257 264 293    < > = values in this interval not displayed.       Clotting Studies    Recent Labs   Lab Test 06/09/21 2007 06/09/21  1527 05/12/21  0542 05/11/21  0724   INR 1.65* 1.46* 1.45* 1.45*   PTT 32  --   --   --        Arterial Blood Gas Testing  No lab results found.     Urine Studies     Recent Labs   Lab Test 06/10/21  0632 05/21/21  0730 04/29/21  1106   URINEPH 5.5 6.0 5.5   NITRITE Negative Negative Negative   LEUKEST Negative Negative Negative   WBCU 1 4 15*       Vancomycin Levels     No lab results found.    Invalid input(s): VANCO    Tobramycin levels     No lab results found.    Gentamicin levels    No lab results found.    Tacrolimus levels    Invalid input(s): TACROLIMUS, TAC, TACR  Transplant Immunosuppression Labs Latest Ref Rng & Units 6/10/2021 6/9/2021 6/8/2021 6/1/2021 5/25/2021   Creat 0.52 - 1.04 mg/dL 0.59 0.64 0.62 0.59 0.64   BUN 7 - 30 mg/dL 22 21 24 20 15   WBC 4.0 - 11.0 10e9/L 22.8(H) 27.5(H) 14.3(H) 11.5(H) 8.8   Neutrophil % 96.5 94.1 - 80.8 -   ANEU 1.6 - 8.3 10e9/L 22.0(H) 25.9(H) - 9.3(H) -       Microbiology:  Blood cx 6/9/2021 negative to date.   Ascitic fluid 6/10/2021 negative to date.   Last check of C difficile  C Diff Toxin B PCR   Date Value Ref Range Status   05/16/2018 Negative NEG^Negative Final     Comment:     Negative: Clostridium difficile target DNA sequences NOT detected, presumed   negative for Clostridium difficile toxin B or the number of bacteria present   may be below the limit of detection for the test.  FDA approved assay performed using [employer] real-time PCR.  A negative result does not exclude actual disease due to Clostridium  difficile   and may be due to improper collection, handling and storage of the specimen   or the number of organisms in the specimen is below the detection limit of the   assay.           Imaging:  CT a/p 6/10/2021 reviewed with the radiologist.   Impression:   1. Cirrhotic liver with multifocal irregularity and dilation of the  biliary ducts which is improved in some locations, and unchanged in  others. No apparent progression of biliary dilation. No new cystic  collections.  2. Sequela of portal hypertension with large volume ascites and  numerous portosystemic collaterals.  3. Dilated gallbladder similar to prior. Intraluminal air presumably  related to stents.  4. Unchanged periaortic lymphadenopathy dating back to 10/27/2020.     I have personally reviewed the examination and initial interpretation  and I agree with the findings.    CXR 6/9/2021  IMPRESSION:  No acute airspace opacities.      Forest Dasilva MD  [facility]  Contact information available via [facility] Paging/Directory     06/11/2021

## 2021-06-11 NOTE — PLAN OF CARE
Afebrile,BP stable this shift,complained of abdominal fullness but.denied pain or any other discomfort..Appetite good,no nausea,had a formed BM x1..Up with standby assist,,denied dizziness when up.Plan to switch to oral antibiotics if pending blood culture result remain negative.Continue per plan of care..

## 2021-06-11 NOTE — PROVIDER NOTIFICATION
0117 Contacted Pati Kohler about patient's blood pressures trending with systolic high 70s to 90s and diastolic 40s and 50s. Pt asymptomatic and other VSS on RA. Will continue to monitor    0144 Contacted Pati Kohler again about pt's BP. BP @ 0140 was 90/54. Will continue to monitor.    0152 Dr Kohler called about pt. With albumin given and patient being asymptomatic we will continue to monitor blood pressures. If systolic drops down into the 70s I will contact the team again and they will order more Albumin.

## 2021-06-11 NOTE — PHARMACY-VANCOMYCIN DOSING SERVICE
"Pharmacy Vancomycin Note  Date of Service 2021  Patient's  1965   55 year old, female    Indication: Sepsis  Day of Therapy: 3  Current vancomycin regimen:  750 mg IV q12h  Current vancomycin monitoring method: AUC  Current vancomycin therapeutic monitoring goal: 400-600 mg*h/L    Current estimated CrCl = Estimated Creatinine Clearance: 103.3 mL/min (based on SCr of 0.59 mg/dL).    Creatinine for last 3 days  2021:  4:02 PM Creatinine 0.62 mg/dL  2021:  3:27 PM Creatinine 0.64 mg/dL  6/10/2021:  7:02 AM Creatinine 0.59 mg/dL    Recent Vancomycin Levels (past 3 days)  2021:  8:35 AM Vancomycin Level 16.8 mg/L    Vancomycin IV Administrations (past 72 hours)                   vancomycin (VANCOCIN) 750 mg in sodium chloride 0.9 % 250 mL intermittent infusion (mg) 750 mg New Bag 06/10/21 2223     750 mg New Bag  0650    vancomycin 1250 mg in 0.9% NaCl 250 mL intermittent infusion 1,250 mg (mg) 1,250 mg New Bag 21 1743                Nephrotoxins and other renal medications (From now, onward)    Start     Dose/Rate Route Frequency Ordered Stop    06/10/21 0530  vancomycin (VANCOCIN) 750 mg in sodium chloride 0.9 % 250 mL intermittent infusion      750 mg  over 90 Minutes Intravenous EVERY 12 HOURS 21 1630      06/10/21 0130  piperacillin-tazobactam (ZOSYN) 3.375 g vial to attach to  mL bag     Note to Pharmacy: For [facility], [facility] and [facility]: For Zosyn-naive patients, use the \"Zosyn initial dose + extended infusion\" order panel.    3.375 g  over 30 Minutes Intravenous EVERY 6 HOURS 21 2305               Contrast Orders - past 72 hours (72h ago, onward)    Start     Dose/Rate Route Frequency Ordered Stop    06/10/21 1447  iopamidol (ISOVUE-250) solution  Status:  Discontinued        PRN 06/10/21 1448 06/10/21 1708    06/10/21 0730  iopamidol (ISOVUE-370) solution 95 mL      95 mL Intravenous ONCE 06/10/21 0727 06/10/21 0742          Interpretation of levels and current " regimen:  Vancomycin level is reflective of -600    Has serum creatinine changed greater than 50% in last 72 hours: No    Urine output:  unable to determine    Renal Function: Stable    Loading dose: N/A  Regimen: 750 mg every 12 hours for 8 doses.  Start time: 09:30 on 06/11/2021  Exposure target: AUC24 (range)400-600 mg/L.hr   AUC24,ss: 537 mg/L.hr  PAUC*: 95 %  Ctrough,ss: 17 mg/L  Pconc*: 23 %  Tox.: 13 %    Plan:  1. Continue Current Dose d/t sepsis indication. May need to change dosing to vanco 1500mg IV q24h depending on next vanco level to decrease chance for toxixity.  2. Vancomycin monitoring method: AUC  3. Vancomycin therapeutic monitoring goal: 400-600 mg*h/L  4. Pharmacy will check vancomycin levels as appropriate in 1-3 Days.  5. Serum creatinine levels will be ordered daily for the first week of therapy and at least twice weekly for subsequent weeks.    Armida Mosley [facility]

## 2021-06-11 NOTE — CONSULTS
Care Management Initial Consult    General Information  Assessment completed with: Chart reviewed and discussed during MDRs   Type of CM/SW Visit: Initial Assessment  Primary Care Provider verified and updated as needed: Yes   Readmission within the last 30 days:     Reason for Consult: Elevated Risk Score  Advance Care Planning: Present on chart        Communication Assessment  Patient's communication style: spoken language (English or Bilingual)    Hearing Difficulty or Deaf: no   Wear Glasses or Blind: yes    Cognitive  Cognitive/Neuro/Behavioral: WDL  Level of Consciousness: alert  Arousal Level: opens eyes spontaneously  Orientation: oriented x 4  Mood/Behavior: calm, cooperative  Best Language: 0 - No aphasia  Speech: clear, spontaneous    Living Environment:   People in home: Lives with Demar (Spouse)  Current living Arrangements: house      Able to return to prior arrangements: yes    Family/Social Support:  Care provided by: self  Provides care for: no one  Marital Status:    Description of Support System: Supportive    Support Assessment: Adequate family and caregiver support-  Pt's spouse is semi-retired and can assist.  She also has two children and a sister that [place] that can assist PRN.    Current Resources:   Patient receiving home care services: No  Community Resources: None  Equipment currently used at home: None  Supplies currently used at home: None    Employment/Financial:  Employment Status: Employed full-time     Financial Concerns: No concerns identified     Lifestyle & Psychosocial Needs:        Socioeconomic History     Marital status:      Spouse name: Not on file     Number of children: Not on file     Years of education: Not on file     Highest education level: Not on file     Tobacco Use     Smoking status: Never Smoker     Smokeless tobacco: Never Used   Substance and Sexual Activity     Alcohol use: No     Comment: Last drink was 2017     Drug use: No        Functional Status:  Prior to admission patient needed assistance:   Dependent ADLs:: Independent  Dependent IADLs:: Independent  Assesssment of Functional Status: At functional baseline    Mental Health Status:  No Current Concerns    Chemical Dependency Status:  No Current Concerns     Values/Beliefs:  Spiritual, Cultural Beliefs, Faith Practices, Values that affect care: yes  Description of Beliefs that Will Affect Care: ([employer])      Additional Information:  CMA completed due to high risk score.  Per ID's note plan to discontinue Vanco and Zosyn (and PICC) if pt's BC is negative by 5pm today. Should this change and pt needs IV antibiotics at discharge, please notify [facility].    JOSETTE Koch, [facility]  Phone:  [phone]   Pager:  [phone]

## 2021-06-11 NOTE — PLAN OF CARE
Admitted/transferred from: PACU  2 RN full   skin assessment completed by Sarai Chester, RN and Davidson WRIGHT.  Skin assessment finding: skin intact, no problems   Interventions/actions: other n/a      Will continue to monitor.

## 2021-06-11 NOTE — PLAN OF CARE
Assumed care from [id number]. A x O, VSS on RA. Denies pain and n/v. Regular diet, tolerating. PICC line readjusted by Vasc. Access and ok to use now. Per pt ambulated to bathroom. +BM. Due to void. Resting between cares. Continue POC.

## 2021-06-11 NOTE — PLAN OF CARE
"  Plan of Care Note    Reason for Admission: Fever and chills, Sepsis, Procedures: ENDOSCOPIC RETROGRADE CHOLANGIOPANCREATOGRAPHY, WITH biliary stent exchange, stone removal  Access Lines: R PICC line Red Heparin locked & purple with infusing TKO (antibiotic line)  Incisions/Drains: Paracentesis puncture site covered with bandage  VS: BP 91/52 (BP Location: Left arm)   Pulse 61   Temp 95.8  F (35.4  C) (Oral)   Resp 17   Ht 1.651 m (5' 5\")   Wt 70 kg (154 lb 4.8 oz)   SpO2 99%   BMI 25.68 kg/m   BP have been low throughout shift. Contacted team and they said that unless pt is sitting in systolic of 70s and Diastolic below 40 just continue to monitor.  Diet: Regular Diet and tolerating  Activity: Up ad indy  Pain Management: Denies pain on shift  GI/: Voiding spontaneously   Neuro: A&Ox4  Team: Surg Onc  Pertinent Labs: WBC 22.8         Shift Summary  Pt sleeping most of shift. Pt up to bathroom and voided 300ml. This was the first time that pt had voided urine since earlier in the morning 6/10. Zosyn run on shift. Continue with POC and monitor BP.      "

## 2021-06-11 NOTE — PROGRESS NOTES
Care Management Follow Up    Length of Stay (days): 2    Expected Discharge Date: 6/13/21     Concerns to be Addressed:  Discharge Planning     Patient plan of care discussed at interdisciplinary rounds: Yes    Anticipated Discharge Disposition: Home     Anticipated Discharge Services: None  Anticipated Discharge DME: N/A    Patient/family educated on Medicare website which has current facility and service quality ratings: N/A  Education Provided on the Discharge Plan: Yes  Patient/Family in Agreement with the Plan: Yes    Referrals Placed by CM/SW:  N/A  Private pay costs discussed: Not applicable    Additional Information:    Patient is currently open to [facility] for IV antibiotics (ceftrixaone 2gm q24hrs). Per ID recs, if blood cultures remain negative by 5pm today, IV abx can be stopped and PICC removed. Patient then can discharge on oral Augmentin upon discharge.     [facility] updated. [facility] will discharge patient from services should IV abx no longer be needed upon discharge. [facility] will follow.    Malia Graves RN, BSN, PHN  Care Coordinator   P: [phone], [facility]

## 2021-06-11 NOTE — PLAN OF CARE
"Problem: Infection  Goal: Absence of Infection Signs and Symptoms  Outcome: Improving    Patient on IV antibiotics with PICC line. Plan to transition to PO antibiotics and pull PICC line.      Problem: Adult Inpatient Plan of Care  Goal: Optimal Comfort and Wellbeing  Outcome: Improving  Intervention: Provide Person-Centered Care  Recent Flowsheet Documentation  Taken 6/11/2021 1500 by Alejandrina De Leon RN  Trust Relationship/Rapport:    questions answered    care explained     Patient denies pain / discomfort.      AOVSS, /63 (BP Location: Left arm)   Pulse 65   Temp 96.1  F (35.6  C) (Oral)   Resp 18   Ht 1.651 m (5' 5\")   Wt 70 kg (154 lb 4.8 oz)   SpO2 98%   BMI 25.68 kg/m  . Writer had patient for uneventful 4 hour shift.   "

## 2021-06-11 NOTE — PROGRESS NOTES
Physician Attestation   I, James Long, was present with the medical/[place] student who participated in the service and in the documentation of the note.  I have verified the history and personally performed the physical exam and medical decision making.  I agree with the assessment and plan of care as documented in the note.           James Long MD  Date of Service (when I saw the patient): 06/11/21    [facility]    Progress Note - Gold 8 Service        Date of Admission:  6/9/2021    Assessment & Plan       Berta Nava is a 55 year old female with a pertinent past medical history of primary sclerosing cholangitis c/b cirrhosis, ascites and esophageal varices, biliary strictures s/p stenting (1/6/2021), ulcerative colitis, hx of SBP who presented to the ED with fever and chills, admitted 6/9 with concern for cholangitis.      Sepsis 2/2 Suspected Acute Cholangitis, s/p ERCP 6/10  Hx of Hepatic Abscess   Presenting with acute fever and chills occurring shortly after home health nurse noticed elevated WBC.  WBC now elevated to 22.8  DDX includes cholangitis, bacteremia, SBP, hepatic abscess.  Of note, after her ERCP 5/5, she was started on ceftriaxone and metronidazole until next ERCP  - Cholangitis is possible, during ERCP (5/5), was found to have purulence concerning for cholangitis. Biliary strictures from her primary sclerosing cholangitis place her at risk.  Fevers and elevated WBC are consistent, as is US showing distended gallbladder with biliary sludge as well as mild nonspecific wall thickening and pericholecystic fluid. However, pt denies abdominal pain and has no RUQ tenderness on PE, pointing away  Hepatic abscess is possible, she was noted to have a possible ~2cm abscess/fluid collection off the left intrahepatic which was unable to be accessed. Again, fever and elevated WBC are consistent while absence of RUQ tenderness points away  -GI  "Panc/bili consulted, appreciate recommendations   -Continue PTD vancomycin and Zosyn   - ERCP 6/10 went well.  Replaced biliary stents and removed \"heavy intrahepatic stone and clot burden\" except one L duct obstructed by stenosis that will likely require cholangioscopy  - appreciate ID recs, they recommend  If blood cx drawn on 6/9/2021 are negative by 5:00 PM today 6/11/2021, please:  - discontinue vancomycin and zosyn   - discontinue the PICC line  - start augmentin 875 mg bid end date 6/16/2021.  - starting 6/17/2021 may receive secondary SBP ppx with cipro 250 mg daily if felt necessary.      Hx of Strep mitis Bacteremia  Hx of SBP  - Initially considered SBP  given significant ascites, with fever and WBC as well as hx of SBP   - Initially considered bacteremia though less less likely.  Recent admission 4/29 - 5/11 for septic shock from strep mitis bacteremia and SBP.   - BC with NGTD   - diagnostic and therapeutic paracentesis 6/10 to assess for SBP and for sx relief              - <250 WBCs in ascitic fluid today points away from SBP              - Gram stain shows no organisms              - cultures pending              - SAAG is 1.7, c/w ascites from portal htn    Primary Sclerosing Cholangitis c/b Cirrhosis, Ascites, and Grade III Esophageal Varices   S/p ERCP (5/5, 5/10) with Biliary Stent Exchanged x2 and Debris Clearance   - elevated alk phos on labs is consistent.  - currently undergoing transplant work up.  MELD 24  - She is scheduled for an outpatient paracentesis 6/11 for her recurrent ascites (has weekly leann) with plans to follow up with the hepatology clinic 6/21.   - Moving paracentesis up to inpt, for both therapeutic and diagnostic purposes (see above)  -Hold carvedilol for above sepsis   -Plans for ciprofloxacin for SBP prophylaxis once patient completes treatment for hepatic abscess.   -ID consult to evaluate for transplant candidacy appreciated, they note no ID-related issues that would " prevent this pt from being activated on the transplant lsit    Hepatic abcess  Noted during last ERCP.  Per GI consult, unclear if communicating with biliary tree, and may need IR drainage if not improved.   - ID consult appreciated to guide abx.  See above    Hypotension  - pt hypotensive night of 6/10-6/11, with systolic high 70s to 90s and diastolic 40s to 50s.  Otherwise asymptomatic without elevated HR, breathing comfortably on RA  - DDx includes intravascular depletion from fluid shift after paracentesis vs cholangitis vs liver dz  - 50 mg albumin given X1 early am of 6/11, and BPs improved since     Hyponatremia -   Sodium 126 on 6/09, improved to 130 on 6/10, baseline WNLs. C/w hypervolemia from cirrhosis and ascites.  The evidence shows that correcting sodium in cirrhotic pt does not positively impact mortality, and pt not sx from this currently.  - S/p 2Ls NS in ED, avoid further IV fluid as able  - holding spironolactone and lasix     Ulcerative Colitis - Continue mesalamine while inpatient.      Immunization status  - due for 2nd dose of HAV and HBV  - give at time of discharge     HLD - Continue simvastatin 20mg nightly      Diet: Regular Diet Adult    DVT Prophylaxis: Pneumatic Compression Devices  Jurado Catheter: not present  Code Status: Full Code           Disposition Plan   Expected discharge: Tomorrow, recommended to prior living arrangement once antibiotic plan established.  Entered: Cesar Hatch 06/11/2021, 10:34 AM       The patient's care was discussed with the Attending Physician, Dr. Long.    Cesar Hatch  Medical Student  [address]  Please see sign in/sign out for up to date coverage information  ______________________________________________________________________    Interval History       BPs in the 70s-90s/40s-50s last night, now s/p 50mg albumin X1.  BPs ok since.  She was asymptomatic with this  Paracentesis went well, and  "she feels \"95%\" better from this  ERCP went fine.  Denies dizziness/lightheadedness/chest-pain/SOB, no N/V, no abdominal pain    Data reviewed today: I reviewed all medications, new labs and imaging results over the last 24 hours. I personally reviewed The CXR from 6/10 showing PICC line in place, and the images from the ERCP procedure 6/10.     Physical Exam   Vital Signs: Temp: 96.9  F (36.1  C) Temp src: Temporal BP: 101/64 Pulse: 61   Resp: 16 SpO2: 99 % O2 Device: None (Room air)    Weight: 154 lbs 4.8 oz  General Appearance: Thin woman, lying in bed, in NAD  HEENT: no jaundice noted  Respiratory: CTAB  Cardiovascular: S1, S2, NMRG appreciated  GI: Soft, nontender, distended, but less distended than yesterday, BS+  Skin: Tanned, no jaundice, no palmar erythema, no telangectasias  Neuro: A/O, no asterixis     Data   Recent Labs   Lab 06/11/21  1134 06/10/21  1050 06/10/21  0702 06/09/21 2007 06/09/21  1527   WBC 21.5*  --  22.8*  --  27.5*   HGB 9.8*  --  9.8*  --  11.6*   MCV 94  --  94  --  96     --  270  --  431   INR  --   --   --  1.65* 1.46*   *  --  130*  --  126*   POTASSIUM 3.7  --  4.1  --  4.7   CHLORIDE 106  --  105  --  100   CO2 18*  --  17*  --  18*   BUN 33*  --  22  --  21   CR 0.79  --  0.59  --  0.64   ANIONGAP 8  --  8  --  8   MARIELENA 8.4*  --  8.4*  --  8.9   *  --  114*  --  102*   ALBUMIN 2.6* 2.5* 2.1*  --  2.4*   PROTTOTAL 6.0*  --  6.0*  --  7.2   BILITOTAL 1.1  --  1.7*  --  2.2*   ALKPHOS 231*  --  234*  --  288*   ALT 22  --  20  --  23   AST 48*  --  36  --  57*   LIPASE  --   --   --   --  203     "

## 2021-06-12 ENCOUNTER — HOME INFUSION (PRE-WILLOW HOME INFUSION) (OUTPATIENT)
Dept: PHARMACY | Facility: CLINIC | Age: 56
End: 2021-06-12

## 2021-06-12 VITALS
HEART RATE: 82 BPM | WEIGHT: 150.9 LBS | BODY MASS INDEX: 25.14 KG/M2 | DIASTOLIC BLOOD PRESSURE: 79 MMHG | HEIGHT: 65 IN | OXYGEN SATURATION: 97 % | SYSTOLIC BLOOD PRESSURE: 109 MMHG | TEMPERATURE: 97 F | RESPIRATION RATE: 14 BRPM

## 2021-06-12 LAB
ALBUMIN SERPL-MCNC: 2.5 G/DL (ref 3.4–5)
ALP SERPL-CCNC: 312 U/L (ref 40–150)
ALT SERPL W P-5'-P-CCNC: 51 U/L (ref 0–50)
ANION GAP SERPL CALCULATED.3IONS-SCNC: 9 MMOL/L (ref 3–14)
AST SERPL W P-5'-P-CCNC: 112 U/L (ref 0–45)
BASOPHILS # BLD AUTO: 0 10E9/L (ref 0–0.2)
BASOPHILS NFR BLD AUTO: 0.1 %
BILIRUB SERPL-MCNC: 1.4 MG/DL (ref 0.2–1.3)
BUN SERPL-MCNC: 22 MG/DL (ref 7–30)
CALCIUM SERPL-MCNC: 8.4 MG/DL (ref 8.5–10.1)
CHLORIDE SERPL-SCNC: 108 MMOL/L (ref 94–109)
CO2 SERPL-SCNC: 17 MMOL/L (ref 20–32)
CREAT SERPL-MCNC: 0.56 MG/DL (ref 0.52–1.04)
DIFFERENTIAL METHOD BLD: ABNORMAL
EOSINOPHIL # BLD AUTO: 0 10E9/L (ref 0–0.7)
EOSINOPHIL NFR BLD AUTO: 0.1 %
ERYTHROCYTE [DISTWIDTH] IN BLOOD BY AUTOMATED COUNT: 15.8 % (ref 10–15)
GFR SERPL CREATININE-BSD FRML MDRD: >90 ML/MIN/{1.73_M2}
GLUCOSE SERPL-MCNC: 98 MG/DL (ref 70–99)
HCT VFR BLD AUTO: 32.3 % (ref 35–47)
HGB BLD-MCNC: 10.3 G/DL (ref 11.7–15.7)
IMM GRANULOCYTES # BLD: 0.1 10E9/L (ref 0–0.4)
IMM GRANULOCYTES NFR BLD: 0.7 %
LYMPHOCYTES # BLD AUTO: 1.1 10E9/L (ref 0.8–5.3)
LYMPHOCYTES NFR BLD AUTO: 5.4 %
MCH RBC QN AUTO: 30.2 PG (ref 26.5–33)
MCHC RBC AUTO-ENTMCNC: 31.9 G/DL (ref 31.5–36.5)
MCV RBC AUTO: 95 FL (ref 78–100)
MONOCYTES # BLD AUTO: 1.5 10E9/L (ref 0–1.3)
MONOCYTES NFR BLD AUTO: 6.9 %
NEUTROPHILS # BLD AUTO: 18.5 10E9/L (ref 1.6–8.3)
NEUTROPHILS NFR BLD AUTO: 86.8 %
NRBC # BLD AUTO: 0 10*3/UL
NRBC BLD AUTO-RTO: 0 /100
PLATELET # BLD AUTO: 291 10E9/L (ref 150–450)
PLATELET # BLD AUTO: 297 10E9/L (ref 150–450)
POTASSIUM SERPL-SCNC: 3.9 MMOL/L (ref 3.4–5.3)
PROT SERPL-MCNC: 6.1 G/DL (ref 6.8–8.8)
RBC # BLD AUTO: 3.41 10E12/L (ref 3.8–5.2)
SODIUM SERPL-SCNC: 134 MMOL/L (ref 133–144)
WBC # BLD AUTO: 21.3 10E9/L (ref 4–11)

## 2021-06-12 PROCEDURE — 90670 PCV13 VACCINE IM: CPT | Performed by: INTERNAL MEDICINE

## 2021-06-12 PROCEDURE — 36592 COLLECT BLOOD FROM PICC: CPT | Performed by: INTERNAL MEDICINE

## 2021-06-12 PROCEDURE — 250N000011 HC RX IP 250 OP 636: Performed by: INTERNAL MEDICINE

## 2021-06-12 PROCEDURE — 90472 IMMUNIZATION ADMIN EACH ADD: CPT | Performed by: INTERNAL MEDICINE

## 2021-06-12 PROCEDURE — 99239 HOSP IP/OBS DSCHRG MGMT >30: CPT | Performed by: INTERNAL MEDICINE

## 2021-06-12 PROCEDURE — 90636 HEP A/HEP B VACC ADULT IM: CPT | Performed by: INTERNAL MEDICINE

## 2021-06-12 PROCEDURE — 80053 COMPREHEN METABOLIC PANEL: CPT | Performed by: INTERNAL MEDICINE

## 2021-06-12 PROCEDURE — 85025 COMPLETE CBC W/AUTO DIFF WBC: CPT | Performed by: INTERNAL MEDICINE

## 2021-06-12 PROCEDURE — 85049 AUTOMATED PLATELET COUNT: CPT | Performed by: INTERNAL MEDICINE

## 2021-06-12 PROCEDURE — G0009 ADMIN PNEUMOCOCCAL VACCINE: HCPCS | Performed by: INTERNAL MEDICINE

## 2021-06-12 PROCEDURE — 258N000003 HC RX IP 258 OP 636: Performed by: INTERNAL MEDICINE

## 2021-06-12 PROCEDURE — 250N000013 HC RX MED GY IP 250 OP 250 PS 637: Performed by: INTERNAL MEDICINE

## 2021-06-12 RX ORDER — CIPROFLOXACIN 250 MG/1
250 TABLET, FILM COATED ORAL DAILY
Qty: 30 TABLET | Refills: 1 | Status: SHIPPED | OUTPATIENT
Start: 2021-06-16 | End: 2021-08-16

## 2021-06-12 RX ORDER — SULFAMETHOXAZOLE/TRIMETHOPRIM 800-160 MG
1 TABLET ORAL
Qty: 12 TABLET | Refills: 1 | Status: SHIPPED | OUTPATIENT
Start: 2021-06-14 | End: 2021-08-09

## 2021-06-12 RX ORDER — SULFAMETHOXAZOLE/TRIMETHOPRIM 800-160 MG
1 TABLET ORAL
Status: DISCONTINUED | OUTPATIENT
Start: 2021-06-14 | End: 2021-06-12 | Stop reason: HOSPADM

## 2021-06-12 RX ADMIN — PNEUMOCOCCAL 13-VALENT CONJUGATE VACCINE 0.5 ML: 2.2; 2.2; 2.2; 2.2; 2.2; 4.4; 2.2; 2.2; 2.2; 2.2; 2.2; 2.2; 2.2 INJECTION, SUSPENSION INTRAMUSCULAR at 15:41

## 2021-06-12 RX ADMIN — PIPERACILLIN SODIUM AND TAZOBACTAM SODIUM 3.38 G: 3; .375 INJECTION, POWDER, LYOPHILIZED, FOR SOLUTION INTRAVENOUS at 10:28

## 2021-06-12 RX ADMIN — Medication 5 ML: at 07:02

## 2021-06-12 RX ADMIN — VANCOMYCIN HYDROCHLORIDE 750 MG: 10 INJECTION, POWDER, LYOPHILIZED, FOR SOLUTION INTRAVENOUS at 11:45

## 2021-06-12 RX ADMIN — THERA TABS 1 TABLET: TAB at 07:58

## 2021-06-12 RX ADMIN — HEPATITIS A AND HEPATITIS B (RECOMBINANT) VACCINE 1 ML: 720; 20 INJECTION, SUSPENSION INTRAMUSCULAR at 15:43

## 2021-06-12 RX ADMIN — CALCIUM CARBONATE 600 MG (1,500 MG)-VITAMIN D3 400 UNIT TABLET 1 TABLET: at 07:58

## 2021-06-12 RX ADMIN — MESALAMINE 2400 MG: 1.2 TABLET, DELAYED RELEASE ORAL at 07:57

## 2021-06-12 RX ADMIN — URSODIOL 300 MG: 300 CAPSULE ORAL at 07:57

## 2021-06-12 RX ADMIN — PIPERACILLIN SODIUM AND TAZOBACTAM SODIUM 3.38 G: 3; .375 INJECTION, POWDER, LYOPHILIZED, FOR SOLUTION INTRAVENOUS at 04:26

## 2021-06-12 ASSESSMENT — MIFFLIN-ST. JEOR: SCORE: 1270.36

## 2021-06-12 ASSESSMENT — ACTIVITIES OF DAILY LIVING (ADL)
ADLS_ACUITY_SCORE: 13

## 2021-06-12 NOTE — DISCHARGE SUMMARY
Discharge  D: Orders for discharge and outpatient medications written  I: home medications and return to clinic schedule reviewed with patient. Patient left at 16:00 accompanied by writerAlejandrina RN   A: Patient/family verbalized understanding; was ready for discharge   P: patient instructed to  medications in Pharmacy. Follow up as scheduled.

## 2021-06-12 NOTE — DISCHARGE SUMMARY
[facility]  Discharge Summary        Berta Nava MRN# [id number]   YOB: 1965 Age: 55 year old     Date of Admission:  6/9/2021  Date of Discharge:  6/12/2021  Admitting Physician:  Wandy Santos MD  Discharge Physician: James Long MD  Discharging Service: Hospitalist     Primary Provider:  [facility], [place]  Primary Care Physician Phone Number: [phone]         Discharge Diagnoses/Problem Oriented Hospital Course (Providers):    Berta Nava was admitted on 6/9/2021 by Wandy Santos MD and I would refer you to their history and physical.  The following problems were addressed during her hospitalization:    Berta Nava is a 55 year old female with a pertinent past medical history of primary sclerosing cholangitis c/b cirrhosis, ascites and esophageal varices, biliary strictures s/p stenting (1/6/2021), ulcerative colitis, hx of SBP who presented to the ED with fever and chills, admitted 6/9 with concern for cholangitis.      Sepsis 2/2 Suspected Acute Cholangitis, s/p ERCP 6/10  Hx of Hepatic Abscess   Presenting with acute fever and chills occurring shortly after home health nurse noticed elevated WBC.  WBC now elevated to 22.8  DDX includes cholangitis, bacteremia, SBP, hepatic abscess.  Of note, after her ERCP 5/5, she was started on ceftriaxone and metronidazole until next ERCP  - Cholangitis during ERCP (5/5), was found to have purulence concerning for cholangitis. Biliary strictures from her primary sclerosing cholangitis place her at risk.  Fevers and elevated WBC are consistent, as is US showing distended gallbladder with biliary sludge as well as mild nonspecific wall thickening and pericholecystic fluid. However, pt denies abdominal pain and has no RUQ tenderness on PE   Hepatic abscess is possible, she was noted to have a possible ~2cm abscess/fluid collection off the left intrahepatic which was unable to be accessed. Again, fever and  "elevated WBC are consistent while absence of RUQ tenderness points away  -GI Panc/bili consulted, appreciate recommendations   - ERCP 6/10 completed >>  Replaced biliary stents and removed \"heavy intrahepatic stone and clot burden\" except one L duct obstructed by stenosis that will likely require cholangioscopy. Plans for repeat ERCP later this month.  - appreciate ID recommendations noted, had been on prolonged IV antibiotics and with short oral antibiotic regimen will complete a 7 week antibiotic course.  Patient will always be at risk for infection and sepsis with out without antibiotics.  Will start SBP prophylaxis with cipro 250 mg daily after antibiotic course.   - continue augmentin 875 mg bid end date 6/16/2021.  - starting 6/17/2021 may receive secondary SBP ppx with cipro 250 mg daily if felt necessary.   - WBC persistently low 20's but patient is clinically improved, follow wbc as outpatient.     Hx of Strep mitis Bacteremia  Hx of SBP  - Initially considered SBP  given significant ascites, with fever and WBC as well as hx of SBP   - Initially considered bacteremia though less less likely.  Recent admission 4/29 - 5/11 for septic shock from strep mitis bacteremia and SBP.   - BC with NGTD   - diagnostic and therapeutic paracentesis 6/10 to assess for SBP was negative              - <250 WBCs in ascitic fluid today points away from SBP              - Gram stain shows no organisms              - cultures pending              - SAAG is 1.7, c/w ascites from portal htn     Primary Sclerosing Cholangitis c/b Cirrhosis, Ascites, and Grade III Esophageal Varices   S/p ERCP (5/5, 5/10) with Biliary Stent Exchanged x2 and Debris Clearance   - elevated alk phos on labs is consistent.  - currently undergoing transplant work up.  MELD 24  - She is scheduled for an outpatient paracentesis 6/11 for her recurrent ascites (has weekly leann) with plans to follow up with the hepatology clinic 6/21.   - Moving paracentesis " up to inpt, for both therapeutic and diagnostic purposes (see above)  -Hold carvedilol for above sepsis   -Plans for ciprofloxacin for SBP prophylaxis once patient completes treatment for hepatic abscess.   -ID consult to evaluate for transplant candidacy appreciated, they note no ID-related issues that would prevent this pt from being activated on the transplant list  - discharge with prevnar prior to discharge      Hepatic abcess  Noted during last ERCP.  Per GI consult, unclear if communicating with biliary tree, and may need IR drainage if not improved.   - ID consult appreciated to guide abx. Has ongoing risk for cholangitis and hepatic abscess irrespective of whether she is on antibiotics or not.     Hypotension - resolved  - pt hypotensive night of 6/10-6/11, with systolic high 70s to 90s and diastolic 40s to 50s.  Otherwise asymptomatic without elevated HR, breathing comfortably on RA  - DDx includes intravascular depletion from fluid shift after paracentesis vs cholangitis vs liver dz  - 50 mg albumin given X1 early am of 6/11, and BPs improved since     Hyponatremia - resolved  Sodium 126 on 6/09, improved to 130 on 6/10, baseline WNLs. C/w hypervolemia from cirrhosis and ascites.  The evidence shows that correcting sodium in cirrhotic pt does not positively impact mortality, and pt not sx from this currently.  - S/p 2Ls NS in ED, avoid further IV fluid as able  - resume spironolactone and lasix with improved bp and Na     Ulcerative Colitis - Continue mesalamine       Immunization status  - due for 2nd dose of HAV and HBV  - give at time of discharge     HLD - Continue simvastatin 20mg nightly         Diet: Regular Diet Adult    DVT Prophylaxis: Pneumatic Compression Devices  Jurado Catheter: not present  Code Status: Full Code                  Code Status:      Full Code         Important Results:      NAD, WEAK AND TIRED NOT SEPTIC  HEENT NORMAL  PULM CTA  CV RRR  GI SOFT +BS  MS NO EDEMA  NEURO NON  FOCAL  PSYCHE STABLE         Pending Results:        Unresulted Labs Ordered in the Past 30 Days of this Admission     Date and Time Order Name Status Description    6/12/2021 1245 Comprehensive metabolic panel In process     6/11/2021 0001 Quantiferon TB Gold Plus In process     6/10/2021 0908 fluid culture - Aerobic Bacterial Preliminary     6/9/2021 1537 Blood culture Preliminary     6/9/2021 1537 Blood culture Preliminary                Discharge Instructions and Follow-Up:      Follow-up Appointments     Follow Up and recommended labs and tests      Follow up with PCP in 1 week    Follow up with [facility] hepatology clinic as directed    Follow up with GI as directed                  Discharge Disposition:      Discharged to home         Discharge Medications:        Current Discharge Medication List      START taking these medications    Details   amoxicillin-clavulanate (AUGMENTIN) 875-125 MG tablet Take 1 tablet by mouth every 12 hours for 5 days  Qty: 10 tablet, Refills: 0    Associated Diagnoses: Cholangitis      ciprofloxacin (CIPRO) 250 MG tablet Take 1 tablet (250 mg) by mouth daily  Qty: 30 tablet, Refills: 1    Associated Diagnoses: PSC (primary sclerosing cholangitis)         CONTINUE these medications which have NOT CHANGED    Details   calcium carbonate 600 mg-vitamin D 400 units (CALTRATE) 600-400 MG-UNIT per tablet Take 1 tablet by mouth daily      carvedilol (COREG) 6.25 MG tablet Take 1 tablet (6.25 mg) by mouth 2 times daily (with meals)  Qty: 60 tablet, Refills: 3    Associated Diagnoses: Secondary esophageal varices without bleeding (H); Cirrhosis of liver with ascites, unspecified hepatic cirrhosis type (H)      furosemide (LASIX) 20 MG tablet Take 20 mg by mouth daily      mesalamine (LIALDA) 1.2 g EC tablet Take 2,400 mg by mouth daily (with breakfast)      multivitamin (CENTRUM SILVER) tablet Take 1 tablet by mouth daily      potassium chloride ER (MICRO-K) 10 MEQ CR capsule Take 10 mEq by  mouth At Bedtime      simvastatin (ZOCOR) 20 MG tablet Take 20 mg by mouth At Bedtime      spironolactone (ALDACTONE) 50 MG tablet Take 50 mg by mouth daily      ursodiol (ACTIGALL) 300 MG capsule Take 300 mg by mouth 2 times daily      lisinopril (ZESTRIL) 10 MG tablet Take 10 mg by mouth daily      nadolol (CORGARD) 20 MG tablet Take 20 mg by mouth daily         STOP taking these medications       cefTRIAXone (ROCEPHIN) 2 GM vial Comments:   Reason for Stopping:         metroNIDAZOLE (FLAGYL) 500 MG tablet Comments:   Reason for Stopping:                    Allergies:         Allergies   Allergen Reactions     Diagnostic X-Ray Materials Hives     PATIENT HAD HIVE REACTION AFTER ADMINISTERING CT CONTRAST DYE.       Contrast Dye             Consultations This Hospital Stay:      Consultation during this admission received from GI, ID          Discharge Orders       After Care Instructions     Activity      Your activity upon discharge: activity as tolerated         Diet      Follow this diet upon discharge: Orders Placed This Encounter      Regular Diet Adult                      Discharge Time:      Greater than 30 minutes.        Image Results From This Hospital Stay (For Non-EPIC Providers):        Results for orders placed or performed during the hospital encounter of 06/09/21   XR Chest Port 1 View    Narrative    EXAM: XR CHEST PORT 1 VIEW  6/9/2021 4:11 PM      HISTORY: cough, fever    COMPARISON: X-ray 4/29/2021    FINDINGS: Single view of the chest. Right PICC tip projects over the  right atrium. No pneumothorax. No pleural effusion. Borderline  enlarged cardiac silhouette. No acute airspace opacities. Partial  visualization of ERCP tubing, better seen on an imaging from  5/10/2021. No aggressive osseous lesions.      Impression    IMPRESSION:  No acute airspace opacities.    I have personally reviewed the examination and initial interpretation  and I agree with the findings.    ASHLY AYERS MD     Abdomen Limited (RUQ)    Narrative    EXAMINATION: Limited Abdominal Ultrasound, 6/9/2021 5:56 PM     COMPARISON: CT 5/8/2021 and ultrasound 5/7/2021    HISTORY: Elevated bili, fever, recent stents and cholangitis.    FINDINGS:   Fluid: Moderate volume of abdominal ascites.    Liver: Limited evaluation of the liver demonstrates heterogeneity  nodular and cirrhotic appearance measuring 12.1 cm.    Gallbladder: Largely distended gallbladder with biliary sludge. With  mild nonspecific wall thickening and pericholecystic fluid. No  positive sonographic Javier's sign or evidence for cholelithiasis.    Bile Ducts: Biliary stent is noted.  The common bile duct distal to  the stent measures 5.5 mm in diameter.    Pancreas: Visualized portions of the head and body of the pancreas are  unremarkable.     Kidney: The right kidney measures 12.1 cm long. There is no  hydronephrosis or hydroureter, no shadowing renal calculi, cystic  lesion or mass.       Impression    IMPRESSION:   1.  Largely distended gallbladder with biliary sludge and mild  nonspecific wall thickening in the setting of liver disease and  ascites. No focal tenderness to suggest acute cholecystitis.  2.  Limited evaluation of the liver demonstrates cirrhotic morphology.  3.  Moderate volume of ascites.    I have personally reviewed the examination and initial interpretation  and I agree with the findings.    KADIE PRITCHARD MD   CT Abdomen Pelvis w Contrast    Narrative    Exam: CT ABDOMEN PELVIS W CONTRAST, 6/10/2021 7:48 AM    Indication: Abdominal pain, fever    Comparison: 5/8/2021    Technique: Helical CT of the abdomen and pelvis with contrast.  Contrast: 95 cc [id number]    Findings:     Abdomen/pelvis:    Cirrhotic configuration of the liver similar to prior. Biliary stents  in the left and right lobes, the left which is new from prior CT.  Scattered cystic biliary dilation some of which appears similar to  prior and other areas which appear  improved from prior. There is a 4.8  x 2.8 cm cystic focus in hepatic segment 7 on series 3 image 103 which  previously measured 6.2 x 4.5 cm. Cystic focus in the caudate lobe  measures up to 4.3 cm the previously 4.4 cm. Many of the dilated ducts  contain hyperdense sludge. No progression of the biliary ductal  dilation. No new hepatic collections. The portal system is grossly  patent.    Distended gallbladder with intraluminal air, which is decreased from  prior. The pancreas is unremarkable. The spleen and right adrenal  gland are unremarkable. Unchanged tiny hypodense 1.0 cm left adrenal  nodule. No hydronephrosis or suspicious renal lesion.    Small and large bowel are normal caliber. Large volume ascites similar  to prior. No intraabdominal free air. Numerous portosystemic  collaterals including a number of esophageal varices. No abdominal  aortic aneurysm. Numerous enlarged periaortic lymph nodes are  unchanged from prior for instance by 1.9 cm left periaortic lymph node  on series 3 image 244.    Bones: Mild degenerative changes. No suspicious osseous lesions.    Lung bases: Bibasilar atelectasis.      Impression    Impression:   1. Cirrhotic liver with multifocal irregularity and dilation of the  biliary ducts which is improved in some locations, and unchanged in  others. No apparent progression of biliary dilation. No new cystic  collections.  2. Sequela of portal hypertension with large volume ascites and  numerous portosystemic collaterals.  3. Dilated gallbladder similar to prior. Intraluminal air presumably  related to stents.  4. Unchanged periaortic lymphadenopathy dating back to 10/27/2020.    I have personally reviewed the examination and initial interpretation  and I agree with the findings.    ELIZABETH LAMB MD   XR Surgery CARRIE G/T 5 Min Fluoro w Stills    Narrative    This exam was marked as non-reportable because it will not be read by a   radiologist or a [place] non-radiologist  provider.         XR Chest Port 1 View    Narrative    Exam: XR CHEST PORT 1 VIEW, 6/10/2021 7:14 PM    Indication: Verify PICC    Comparison: Chest x-ray 6/9/2021.    Findings:   AP portable single view of the chest. Tip of the right-sided PICC  projecting over right cavoatrial junction. Partial visualization of  biliary stents. Low lung volumes without focal consolidation.  No  significant pneumothorax or pleural effusion.      Impression    Impression:   1. Tip of the right-sided PICC projects over the right cavoatrial  junction.  2. Low lung volumes without focal consolidation.     I have personally reviewed the examination and initial interpretation  and I agree with the findings.    JULIANA MAC MD           Most Recent Lab Results In EPIC (For Non-EPIC Providers):    Most Recent 3 CBC's:  Recent Labs   Lab Test 06/12/21  1259 06/12/21  0710 06/11/21  1134 06/10/21  0702   WBC 21.3*  --  21.5* 22.8*   HGB 10.3*  --  9.8* 9.8*   MCV 95  --  94 94    297 319 270      Most Recent 3 BMP's:  Recent Labs   Lab Test 06/11/21  1134 06/10/21  0702 06/09/21  1527   * 130* 126*   POTASSIUM 3.7 4.1 4.7   CHLORIDE 106 105 100   CO2 18* 17* 18*   BUN 33* 22 21   CR 0.79 0.59 0.64   ANIONGAP 8 8 8   MARIELENA 8.4* 8.4* 8.9   * 114* 102*     Most Recent 3 Troponin's:No lab results found.    Invalid input(s): TROP, TROPONINIES  Most Recent 3 INR's:  Recent Labs   Lab Test 06/09/21  2007 06/09/21  1527 05/12/21  0542   INR 1.65* 1.46* 1.45*     Most Recent 2 LFT's:  Recent Labs   Lab Test 06/11/21  1134 06/10/21  0702   AST 48* 36   ALT 22 20   ALKPHOS 231* 234*   BILITOTAL 1.1 1.7*     Most Recent Cholesterol Panel:No lab results found.  Most Recent 6 Bacteria Isolates From Any Culture (See EPIC Reports for Culture Details):  Recent Labs   Lab Test 06/10/21  1140 06/10/21  0632 06/10/21  0631 06/09/21  1613 06/09/21  1556 05/07/21  1350   CULT Culture negative monitoring continues Canceled, Test credited   Duplicate request  Notification of test cancellation was given to  Christine Torres RN from 7C. 6/10/21 at 0812.TV.   No growth No growth after 3 days No growth after 3 days No anaerobes isolated  No growth     Most Recent TSH, T4 and HgbA1c:  Recent Labs   Lab Test 04/29/21  1840   A1C 4.3

## 2021-06-12 NOTE — PLAN OF CARE
Patient finished IV antibiotics, now switched to oral for discharge. PICC line discontinued. Patient is voiding, up with minimal assist. No complaints of pain. Tolerating a regular diet. Patient will get IM vaccines and then discharge to home. Will follow up per orders.

## 2021-06-14 ENCOUNTER — PATIENT OUTREACH (OUTPATIENT)
Dept: CARE COORDINATION | Facility: CLINIC | Age: 56
End: 2021-06-14

## 2021-06-14 ENCOUNTER — HOME INFUSION (PRE-WILLOW HOME INFUSION) (OUTPATIENT)
Dept: PHARMACY | Facility: CLINIC | Age: 56
End: 2021-06-14

## 2021-06-14 DIAGNOSIS — Z71.89 OTHER SPECIFIED COUNSELING: ICD-10-CM

## 2021-06-14 LAB
GAMMA INTERFERON BACKGROUND BLD IA-ACNC: 0.04 IU/ML
M TB IFN-G CD4+ BCKGRND COR BLD-ACNC: 0.03 IU/ML
M TB TUBERC IFN-G BLD QL: ABNORMAL
MITOGEN IGNF BCKGRD COR BLD-ACNC: 0 IU/ML
MITOGEN IGNF BCKGRD COR BLD-ACNC: 0 IU/ML

## 2021-06-14 NOTE — PROGRESS NOTES
[facility]: Post-Discharge Note  SITUATION                                                      Admission:    Admission Date: 06/09/21   Reason for Admission: Sepsis 2/2 Suspected Acute Cholangitis  Discharge:   Discharge Date: 06/12/21  Discharge Diagnosis: Sepsis 2/2 Suspected Acute Cholangitis    BACKGROUND                                                      Berta Nava is a 55 year old female with a pertinent past medical history of primary sclerosing cholangitis c/b cirrhosis, ascites and esophageal varices, biliary strictures s/p stenting (1/6/2021), ulcerative colitis, hx of SBP who presented to the ED with fever and chills, admitted 6/9 with concern for cholangitis.     ASSESSMENT      Discharge Assessment  Patient reports symptoms are: Improved  Does the patient have all of their medications?: Yes  Does patient know what their new medications are for?: Yes  Does patient have a follow-up appointment scheduled?: Yes  Does patient have any other questions or concerns?: No    Post-op  Did the patient have surgery or a procedure: No  Fever: No  Chills: No  Eating & Drinking: eating and drinking without complaints/concerns  PO Intake: regular diet  Bowel Function: normal        PLAN                                                      Outpatient Plan:       Follow Up and recommended labs and tests      Follow up with PCP in 1 week     Follow up with [facility] hepatology clinic as directed     Follow up with GI as directed     Future Appointments   Date Time Provider Department Center   6/18/2021  1:00 PM [address]   6/21/2021  8:00 AM Leventhal, Thomas Michael, MD [facility]   6/21/2021  3:30 PM RM COVID LAB [facility]           Ivone Polanco MA

## 2021-06-14 NOTE — PROGRESS NOTES
This is a recent snapshot of the patient's [facility] medical record.  For current drug dose and complete information and questions, call [phone] or In Basket pool, [facility] home infusion ([id number])  CSN Number:  [id number]

## 2021-06-15 ENCOUNTER — COMMITTEE REVIEW (OUTPATIENT)
Dept: TRANSPLANT | Facility: CLINIC | Age: 56
End: 2021-06-15

## 2021-06-15 DIAGNOSIS — K74.60 CIRRHOSIS OF LIVER (H): Primary | ICD-10-CM

## 2021-06-15 LAB
BACTERIA SPEC CULT: NO GROWTH
SPECIMEN SOURCE: NORMAL

## 2021-06-15 NOTE — COMMITTEE REVIEW
Abdominal Committee Review Note     Evaluation Date: 3/23/2021  Committee Review Date: 6/15/2021    Organ being evaluated for: Liver    Transplant Phase: Evaluation  Transplant Status: Active    Transplant Coordinator: Wilmer Lazar Jr.  Transplant Surgeon:   Ernesto Schmitt    Referring Physician: Venus London    Primary Diagnosis: Primary Sclerosing Cholangitis: Ulcerative Colitis  Secondary Diagnosis:     Committee Review Members:  Pharmacy Dana Trujillo, [facility]    - Clinical Helene Denney, JOSETTE, Katie Mejia, [facility]   Transplant Vanessa Durant, RN, Marjorie Reyes, RN, Lucy James, RN, Monalisa Bar, RN, Jr Wilmer Lazar, ADRIANA, Simona Traylor MD, Andria Rowe, APRN CNP, Ricadro Contreras MD, Taqueria ePterson MD   Transplant Hepatology  [facility] Jim Kidd MD, Scott Puri MD, Emma Mao MD, Fabio Gotti MD, Thomas M. Leventhal, MD   Transplant Surgery Ernesto Schmitt MD, Sri Wilks MD       Transplant Eligibility: Cirrhosis with MELD, PSC    Committee Review Decision: Approved    Relative Contraindications: None    Absolute Contraindications: None    Committee Chair Emma Mao MD verbally attested to the committee's decision.    Committee Discussion Details:     Approved pending in-person follow up with Dr. Leventhal      
Offered/Dispensed/Educated

## 2021-06-15 NOTE — PROGRESS NOTES
This is a recent snapshot of the patient's [facility] medical record.  For current drug dose and complete information and questions, call [phone] or In Basket pool, [facility] home infusion ([id number])  CSN Number:  [id number]

## 2021-06-16 NOTE — PROGRESS NOTES
This is a recent snapshot of the patient's [facility] medical record.  For current drug dose and complete information and questions, call [phone] or In Basket pool, [facility] home infusion ([id number])  CSN Number:  [id number]

## 2021-06-17 DIAGNOSIS — R18.8 CIRRHOSIS OF LIVER WITH ASCITES, UNSPECIFIED HEPATIC CIRRHOSIS TYPE (H): ICD-10-CM

## 2021-06-17 DIAGNOSIS — K74.60 CIRRHOSIS OF LIVER WITH ASCITES, UNSPECIFIED HEPATIC CIRRHOSIS TYPE (H): ICD-10-CM

## 2021-06-17 DIAGNOSIS — K83.01 PSC (PRIMARY SCLEROSING CHOLANGITIS) (H): Primary | ICD-10-CM

## 2021-06-17 RX ORDER — FUROSEMIDE 20 MG
20 TABLET ORAL DAILY
Qty: 90 TABLET | Refills: 1 | Status: SHIPPED | OUTPATIENT
Start: 2021-06-17 | End: 2021-06-21

## 2021-06-17 RX ORDER — MESALAMINE 1.2 G/1
2400 TABLET, DELAYED RELEASE ORAL
Qty: 180 TABLET | Refills: 1 | Status: ON HOLD | OUTPATIENT
Start: 2021-06-17 | End: 2021-09-09

## 2021-06-17 RX ORDER — URSODIOL 300 MG/1
300 CAPSULE ORAL 2 TIMES DAILY
Qty: 180 CAPSULE | Refills: 1 | Status: ON HOLD | OUTPATIENT
Start: 2021-06-17 | End: 2021-11-27

## 2021-06-17 NOTE — PROGRESS NOTES
Message from Dr. Leventhal approving the following to be re-started/continued.     Ursodiol Vysy692 mg 2x day need 90 day supply   Mesalamine  Tabs1.2 gm 2x day need 90 day supply   Furosemide Tabs20 mg 1x day need 90 day supply

## 2021-06-17 NOTE — PROGRESS NOTES
Patient transfer note.    Patient accepted to be transferred from [facility] emergency room to [facility] ICU.    Discussed with Dr. YI Cedeño at [facility] emergency room.    Patient is a 55-year-old lady with a history of primary sclerosing cholangitis.  She has a history of recurrent ascites and undergoes paracentesis on a regular basis.  Last paracentesis before today was about 2 weeks ago.    She came to get another paracentesis done today and was found to have a very low blood pressure, in the 60s.  So she was sent to the emergency room.  Patient also gives a history of low-grade fever at home.  But she denied any significant abdominal pain.    Her work-up in the emergency room showed significant leukocytosis of around 43,000, acute kidney injury, elevated LFTs.    She underwent paracentesis by IR in the emergency room.  The fluid analysis showed evidence of SBP based on cell counts.    Patient was started on IV Zosyn.  We will central line was placed by ER physician.  She was started on vasopressor (norepinephrine) for her septic shock.  No ICU beds were available at [facility] so patient is being transferred to [facility].    Dr. Chu is also getting a noncontrast abdominal CT.    Septic shock.  SBP.  ICU admission.    DENISSE Kasper and Dr. Maher are also aware.    Please page admitting pager as soon as the patient arrives to the ICU.   .

## 2021-06-18 ENCOUNTER — HOSPITAL ENCOUNTER (OUTPATIENT)
Dept: ULTRASOUND IMAGING | Facility: CLINIC | Age: 56
Discharge: HOME | End: 2021-06-18
Attending: INTERNAL MEDICINE | Admitting: INTERNAL MEDICINE
Payer: COMMERCIAL

## 2021-06-18 VITALS — DIASTOLIC BLOOD PRESSURE: 62 MMHG | HEART RATE: 79 BPM | SYSTOLIC BLOOD PRESSURE: 106 MMHG

## 2021-06-18 DIAGNOSIS — R18.8 CIRRHOSIS OF LIVER WITH ASCITES, UNSPECIFIED HEPATIC CIRRHOSIS TYPE (H): ICD-10-CM

## 2021-06-18 DIAGNOSIS — K74.60 CIRRHOSIS OF LIVER WITH ASCITES, UNSPECIFIED HEPATIC CIRRHOSIS TYPE (H): ICD-10-CM

## 2021-06-18 PROCEDURE — 250N000011 HC RX IP 250 OP 636

## 2021-06-18 PROCEDURE — 49083 ABD PARACENTESIS W/IMAGING: CPT

## 2021-06-18 PROCEDURE — P9047 ALBUMIN (HUMAN), 25%, 50ML: HCPCS

## 2021-06-18 PROCEDURE — 250N000009 HC RX 250: Performed by: RADIOLOGY

## 2021-06-18 RX ORDER — LIDOCAINE HYDROCHLORIDE 10 MG/ML
5 INJECTION, SOLUTION EPIDURAL; INFILTRATION; INTRACAUDAL; PERINEURAL ONCE
Status: COMPLETED | OUTPATIENT
Start: 2021-06-18 | End: 2021-06-18

## 2021-06-18 RX ORDER — ALBUMIN (HUMAN) 12.5 G/50ML
25 SOLUTION INTRAVENOUS ONCE
Status: COMPLETED | OUTPATIENT
Start: 2021-06-18 | End: 2021-06-18

## 2021-06-18 RX ORDER — ALBUMIN (HUMAN) 12.5 G/50ML
SOLUTION INTRAVENOUS
Status: COMPLETED
Start: 2021-06-18 | End: 2021-06-18

## 2021-06-18 RX ORDER — ALBUMIN (HUMAN) 12.5 G/50ML
12.5 SOLUTION INTRAVENOUS ONCE
Status: COMPLETED | OUTPATIENT
Start: 2021-06-18 | End: 2021-06-18

## 2021-06-18 RX ADMIN — ALBUMIN (HUMAN) 25 G: 12.5 SOLUTION INTRAVENOUS at 13:25

## 2021-06-18 RX ADMIN — ALBUMIN (HUMAN) 12.5 G: 12.5 SOLUTION INTRAVENOUS at 13:35

## 2021-06-18 RX ADMIN — ALBUMIN HUMAN 25 G: 0.25 SOLUTION INTRAVENOUS at 13:25

## 2021-06-18 RX ADMIN — ALBUMIN HUMAN 12.5 G: 0.25 SOLUTION INTRAVENOUS at 13:35

## 2021-06-18 RX ADMIN — LIDOCAINE HYDROCHLORIDE 5 ML: 10 INJECTION, SOLUTION EPIDURAL; INFILTRATION; INTRACAUDAL; PERINEURAL at 13:27

## 2021-06-18 NOTE — PROGRESS NOTES
Pt was in Radiology today for a paracentesis. Procedure performed by Dr Pollard Procedure was tolerated well, vitals remained stable.6600 cc's of yellow colored fluid removed. 37.5 Albumin instilled per protocol. Written and verbal instructions were reviewed here is no evidence of bleeding upon discharge.  Pt left department in stable satisfactory condition.

## 2021-06-21 ENCOUNTER — OFFICE VISIT (OUTPATIENT)
Dept: GASTROENTEROLOGY | Facility: CLINIC | Age: 56
End: 2021-06-21
Attending: INTERNAL MEDICINE
Payer: COMMERCIAL

## 2021-06-21 VITALS
WEIGHT: 144.5 LBS | HEART RATE: 77 BPM | SYSTOLIC BLOOD PRESSURE: 93 MMHG | DIASTOLIC BLOOD PRESSURE: 66 MMHG | OXYGEN SATURATION: 100 % | BODY MASS INDEX: 24.07 KG/M2 | TEMPERATURE: 97.8 F | HEIGHT: 65 IN

## 2021-06-21 DIAGNOSIS — K74.60 CIRRHOSIS OF LIVER WITH ASCITES, UNSPECIFIED HEPATIC CIRRHOSIS TYPE (H): ICD-10-CM

## 2021-06-21 DIAGNOSIS — E44.0 MODERATE PROTEIN-CALORIE MALNUTRITION (H): ICD-10-CM

## 2021-06-21 DIAGNOSIS — K83.01 PSC (PRIMARY SCLEROSING CHOLANGITIS) (H): Primary | ICD-10-CM

## 2021-06-21 DIAGNOSIS — K74.60 CIRRHOSIS OF LIVER (H): ICD-10-CM

## 2021-06-21 DIAGNOSIS — K83.09 CHOLANGITIS (H): ICD-10-CM

## 2021-06-21 DIAGNOSIS — R18.8 CIRRHOSIS OF LIVER WITH ASCITES, UNSPECIFIED HEPATIC CIRRHOSIS TYPE (H): ICD-10-CM

## 2021-06-21 DIAGNOSIS — I85.10 SECONDARY ESOPHAGEAL VARICES WITHOUT BLEEDING (H): ICD-10-CM

## 2021-06-21 DIAGNOSIS — Z11.59 ENCOUNTER FOR SCREENING FOR OTHER VIRAL DISEASES: ICD-10-CM

## 2021-06-21 DIAGNOSIS — K83.01 PSC (PRIMARY SCLEROSING CHOLANGITIS) (H): ICD-10-CM

## 2021-06-21 DIAGNOSIS — K51.019 ULCERATIVE PANCOLITIS WITH COMPLICATION (H): ICD-10-CM

## 2021-06-21 LAB
ALBUMIN SERPL-MCNC: 2.6 G/DL (ref 3.4–5)
ALP SERPL-CCNC: 426 U/L (ref 40–150)
ALT SERPL W P-5'-P-CCNC: 26 U/L (ref 0–50)
ANION GAP SERPL CALCULATED.3IONS-SCNC: 12 MMOL/L (ref 3–14)
AST SERPL W P-5'-P-CCNC: 48 U/L (ref 0–45)
BILIRUB DIRECT SERPL-MCNC: 1.6 MG/DL (ref 0–0.2)
BILIRUB SERPL-MCNC: 2.2 MG/DL (ref 0.2–1.3)
BUN SERPL-MCNC: 18 MG/DL (ref 7–30)
CALCIUM SERPL-MCNC: 9 MG/DL (ref 8.5–10.1)
CHLORIDE SERPL-SCNC: 103 MMOL/L (ref 94–109)
CO2 SERPL-SCNC: 19 MMOL/L (ref 20–32)
CREAT SERPL-MCNC: 0.68 MG/DL (ref 0.52–1.04)
ERYTHROCYTE [DISTWIDTH] IN BLOOD BY AUTOMATED COUNT: 15.8 % (ref 10–15)
GFR SERPL CREATININE-BSD FRML MDRD: >90 ML/MIN/{1.73_M2}
GLUCOSE SERPL-MCNC: 102 MG/DL (ref 70–99)
HCT VFR BLD AUTO: 32.1 % (ref 35–47)
HGB BLD-MCNC: 10.7 G/DL (ref 11.7–15.7)
INR PPP: 1.44 (ref 0.86–1.14)
MCH RBC QN AUTO: 30.2 PG (ref 26.5–33)
MCHC RBC AUTO-ENTMCNC: 33.3 G/DL (ref 31.5–36.5)
MCV RBC AUTO: 91 FL (ref 78–100)
PLATELET # BLD AUTO: 353 10E9/L (ref 150–450)
POTASSIUM SERPL-SCNC: 4.4 MMOL/L (ref 3.4–5.3)
PROT SERPL-MCNC: 7 G/DL (ref 6.8–8.8)
RBC # BLD AUTO: 3.54 10E12/L (ref 3.8–5.2)
SARS-COV-2 RNA RESP QL NAA+PROBE: NORMAL
SODIUM SERPL-SCNC: 134 MMOL/L (ref 133–144)
SPECIMEN SOURCE: NORMAL
WBC # BLD AUTO: 14 10E9/L (ref 4–11)

## 2021-06-21 PROCEDURE — 80048 BASIC METABOLIC PNL TOTAL CA: CPT | Performed by: PATHOLOGY

## 2021-06-21 PROCEDURE — 85610 PROTHROMBIN TIME: CPT | Performed by: PATHOLOGY

## 2021-06-21 PROCEDURE — G0463 HOSPITAL OUTPT CLINIC VISIT: HCPCS

## 2021-06-21 PROCEDURE — 99215 OFFICE O/P EST HI 40 MIN: CPT | Performed by: INTERNAL MEDICINE

## 2021-06-21 PROCEDURE — U0003 INFECTIOUS AGENT DETECTION BY NUCLEIC ACID (DNA OR RNA); SEVERE ACUTE RESPIRATORY SYNDROME CORONAVIRUS 2 (SARS-COV-2) (CORONAVIRUS DISEASE [COVID-19]), AMPLIFIED PROBE TECHNIQUE, MAKING USE OF HIGH THROUGHPUT TECHNOLOGIES AS DESCRIBED BY CMS-2020-01-R: HCPCS | Performed by: INTERNAL MEDICINE

## 2021-06-21 PROCEDURE — 80076 HEPATIC FUNCTION PANEL: CPT | Performed by: PATHOLOGY

## 2021-06-21 PROCEDURE — 85027 COMPLETE CBC AUTOMATED: CPT | Performed by: PATHOLOGY

## 2021-06-21 PROCEDURE — 36415 COLL VENOUS BLD VENIPUNCTURE: CPT | Performed by: PATHOLOGY

## 2021-06-21 PROCEDURE — U0005 INFEC AGEN DETEC AMPLI PROBE: HCPCS | Performed by: INTERNAL MEDICINE

## 2021-06-21 RX ORDER — SPIRONOLACTONE 50 MG/1
100 TABLET, FILM COATED ORAL DAILY
Qty: 180 TABLET | Refills: 3 | Status: SHIPPED | OUTPATIENT
Start: 2021-06-21 | End: 2021-09-27

## 2021-06-21 RX ORDER — FUROSEMIDE 20 MG
40 TABLET ORAL DAILY
Qty: 180 TABLET | Refills: 3 | Status: SHIPPED | OUTPATIENT
Start: 2021-06-21 | End: 2021-09-27

## 2021-06-21 ASSESSMENT — PAIN SCALES - GENERAL: PAINLEVEL: NO PAIN (0)

## 2021-06-21 ASSESSMENT — MIFFLIN-ST. JEOR: SCORE: 1241.33

## 2021-06-21 NOTE — PATIENT INSTRUCTIONS
- STOP carvedilol/Coreg    - We will increase the lasix (furosemide) to 40mg daily  - We will increase the spironolactone to 100mg daily    Cirrhosis Education  Nutrition  - For patients with cirrhosis, it is very important to eat the right types and amounts of foods.  We recommend a diet low in carbohydrates/sugars and high in fresh fruits/vegetables, with the right amount of protein.  We typically recommend 1.5gm/kg/day of protein, or  grams of protein every day.  - In regard to protein intake, you can focus on: All meats, cooked fish and seafood, vegetable-based protein meat products, tofu, eggs, legumes, dairy products (including milk and yogurt and cheese), unsalted nuts.  - You should eat at least three meals a day and three to four snacks between meals  - Bedtime snacks are especially important (preferrably something with some protein)    - Patients with malnutrition and/or loss of muscular mass can improve their nutrition and muscular mass by drinking two cans of dietary supplements daily, particularly at bedtime.  These would include: Ensure, Boost, [place] Instant Milk, Glucerna, (or similar supplements)  - Please avoid eating raw seafood, especially shellfish, because of risk of serious illness  - We recommend all patients with cirrhosis limit their daily sodium intake to less than 2,000mg      General Liver Health  - Continue to avoid the use of all non-steroid anti-inflammatory medicines (ibuprofen, Aleve, naproxen, aspirin, etc.) as this can cause serious injury to your kidneys in the setting of liver disease  - If you see a doctor and they prescribe you a new medication, please contact our clinic to let us know what changes are being made  - If you become acutely ill and present to an ER or are admitted to a hospital, please let us know as soon as you are able.  - We recommend that all patients with chronic liver disease be vaccinated against hepatitis A and B.  Please discuss with your  primary provider the need for these vaccines  - As patients with liver disease are at an increased risk of developing osteoporosis, we recommend that you have a DEXA scan with appropriate follow up and treatment.   - We recommend screening for Vitamin D deficiency (at least yearly) and aggressive replacement/supplementation as warranted.    Sleep Troubles:  - Sleep issues are very common in patients with chronic liver disease  - Recommend strict avoidance of medications such as narcotics, sedatives and sleep aids.    - Low dose benadryl or melatonin can be used for insomnia, if needed.

## 2021-06-21 NOTE — LETTER
6/21/2021         RE: Berta Nava  [id number] [facility] [id number]        Dear Colleague,    Thank you for referring your patient, Berta Nava, to the [facility] [place]. Please see a copy of my visit note below.    Date of Service: June 21, 2021      Subjective:            Berta Nava is a 55 year old female presenting for evaluation of liver disease    History of Present Illness   Berta Nava is a 55 year old female with past medical history of hypertension, microscopic colitis, ulcerative colitis, pancreatic mucinous cystadenoma, primary sclerosing cholangitis who presents with concerns of cirrhosis complicated by esophageal varices, ascites.    Since last seen she has had a rather complicated course.  She was admitted to the hospital on April 29 with concerns of bacteremia and septic shock ultimately transferred to the [facility] where she was found to have persistent bacteremia, SBP, and cholangitis.  She underwent repeat ERCP with noted overt pus from biliary tree, had intervention and stent placement.  At that time she had issues with with develops ascites that has persisted, and she has been requiring a paracentesis every 1 week.  She was again admitted June 9-12 with concerns of cholangitis.  Repeat ERCP on Alayna 10 demonstrated partially occluded stents with intrahepatic ductal stones.    Since discharge she reports that she has been feeling fairly well, however she does have abdominal discomfort related to her ascites.  She believes that she is thinking clearly and otherwise has normal memory and concentration.  She has not required lactulose to date.    She has significant concerns about her overall decline and feels that her overall quality of life is significantly diminished    History of liver disease  Notes that she was dealing with issues of fatigue and pruritus in her late 30s, and had issues with abnormal liver tests at that  time.  In evaluation as to the cause of her abnormal liver test she was noted to have a pancreatic cyst.  She underwent further evaluation for the pancreatic cyst which demonstrated to be a mucinous cystadenoma, but reports that there was less focus on her overall liver disease.  She ultimately presented with worsening of her liver tests in 2012, underwent a liver biopsy at that time which demonstrated bridging fibrosis and changes consistent with PBC.  She was started on ursodiol at that time.  In approximately 2014 she was having issues with diarrhea and demonstrated mild changes concerning for ulcerative colitis.  In March 2019 she had issues of bright red blood per rectum and a colonoscopy at that time demonstrated overt changes of ulcerative colitis.  She had a surveillance colonoscopy most recently on December 28, 2020 and surveillance biopsies did not demonstrate any activity of disease as the patient is currently on long-acting mesalamine.    She has had some issues with ascites over the past year, and was started on spironolactone in addition to her baseline hydrochlorothiazide in October 2020.  She follows her weights closely and does feel that she has more bloating or increased girth of her abdomen at this time.  Imaging had demonstrated large lymphadenopathy and she underwent an EUS for further evaluation December 1, 2020.  That exam demonstrated large esophageal varices and the FNA from that exam demonstrated noncaseating granulomas.  She has been seen by pulmonary and the concern is that she may have sarcoidosis.    Past Medical History:  Past Medical History:   Diagnosis Date     Ascites      Biliary cirrhosis (H)      Cholangitis, sclerosing      Cirrhosis of liver with ascites (H) 3/3/2021     Hearing loss of left ear     wears a hearing aide     History of low potassium      Hyperlipidemia      Hypertension      SBP (spontaneous bacterial peritonitis) (H) 4/30/2021     Sjogren's syndrome (H)       Ulcerative colitis (H)      Ulcerative pancolitis (H)        Surgical History:  Past Surgical History:   Procedure Laterality Date     ENDOSCOPIC RETROGRADE CHOLANGIOPANCREATOGRAPHY, EXCHANGE TUBE/STENT N/A 2021    Procedure: ENDOSCOPIC RETROGRADE CHOLANGIOPANCREATOGRAPHY WITH STENT EXCHANGE, STONE EXTRACTION, AND DILATION;  Surgeon: Gregory Gabriel MD;  Location: [facility] OR     ENDOSCOPIC RETROGRADE CHOLANGIOPANCREATOGRAPHY, EXCHANGE TUBE/STENT N/A 5/10/2021    Procedure: ENDOSCOPIC RETROGRADE CHOLANGIOPANCREATOGRAPHY with biliary dilation, stone removal, stent exchange;  Surgeon: Gregory Gabriel MD;  Location: [facility] OR     ENDOSCOPIC RETROGRADE CHOLANGIOPANCREATOGRAPHY, EXCHANGE TUBE/STENT N/A 6/10/2021    Procedure: ENDOSCOPIC RETROGRADE CHOLANGIOPANCREATOGRAPHY, WITH biliary stent exchange, stone removal;  Surgeon: Woodrow Lott MD;  Location: [facility] OR     GYN SURGERY      bilat fallopian tubes and ovaries removed     PICC DOUBLE LUMEN PLACEMENT Right 2021    42cm (2cm external), Lateral brachial vein   - BSO    Social History:  Social History     Tobacco Use     Smoking status: Never Smoker     Smokeless tobacco: Never Used   Substance Use Topics     Alcohol use: No     Comment: Last drink was 2017     Drug use: No       Family History:  -Denies a family history of overt liver disease.  Notes that her mother  of complications of an angiosarcoma.  Her father had coronary artery disease with a heart attack at the age of 46.  Ultimately he underwent a heart transplant and lived for 15 years, dying in his 70s.  There is no family history of ulcerative colitis, Crohn's disease.  Unclear history of thyroid disease.    Medications:  Current Outpatient Medications   Medication     calcium carbonate 600 mg-vitamin D 400 units (CALTRATE) 600-400 MG-UNIT per tablet     ciprofloxacin (CIPRO) 250 MG tablet     furosemide (LASIX) 20 MG tablet     mesalamine (LIALDA) 1.2 g EC tablet     multivitamin (CENTRUM  "SILVER) tablet     simvastatin (ZOCOR) 20 MG tablet     spironolactone (ALDACTONE) 50 MG tablet     sulfamethoxazole-trimethoprim (BACTRIM DS) 800-160 MG tablet     ursodiol (ACTIGALL) 300 MG capsule     lisinopril (ZESTRIL) 10 MG tablet     No current facility-administered medications for this visit.        Review of Systems  A complete 10 point review of systems was asked and answered in the negative unless specifically commented upon in the HPI    Objective:         Vitals:    06/21/21 0752   BP: 93/66   Pulse: 77   Temp: 97.8  F (36.6  C)   TempSrc: Oral   SpO2: 100%   Weight: 65.5 kg (144 lb 8 oz)   Height: 1.651 m (5' 5\")     Body mass index is 24.05 kg/m .     Physical Exam  Constitutional: Well-developed, well-nourished, in no apparent distress.    HEENT: Normocephalic. faint scleral icterus. Moist oral mucosa. Dentition normal  Neck/Lymph: Normal ROM  Cardiac:  Regular rate and rhythm.  Respiratory: Normal respiratory excursion   GI:  Abdomen soft, distended, non-tender. BS present.  Skin:  Skin is warm and dry. No rash noted.  Peripheral Vascular: no lower extremity edema. 2+ pulses in all extremities  Musculoskeletal:  ROM intact, normal muscle bulk    Psychiatric: Normal mood and affect. Behavior is normal.  Neuro:  no asterixis, no tremor    Labs and Diagnostic tests:    MELD-Na score: 17 at 6/11/2021 11:34 AM  MELD score: 13 at 6/11/2021 11:34 AM  Calculated from:  Serum Creatinine: 0.79 mg/dL (Rounded to 1 mg/dL) at 6/11/2021 11:34 AM  Serum Sodium: 132 mmol/L at 6/11/2021 11:34 AM  Total Bilirubin: 1.1 mg/dL at 6/11/2021 11:34 AM  INR(ratio): 1.65 at 6/9/2021  8:07 PM  Age: 55 years 9 months    Imaging:  MRI w/wo IV 2/4/2021  FINDINGS: Image quality is slightly degraded by patient motion.    Liver: Nodular liver surface with diffusely heterogeneous parenchyma, including scattered linear and wedge-shaped areas of T2 hyperintensity. On T1-weighted imaging, there are multiple branching T1 hyperintense " areas which are not enhancing on the postcontrast images. No focal liver lesion is identified.    Biliary tree/gallbladder: Hydropic gallbladder with a few small layering gallstones, a few of which are located in the cystic duct. As seen previously, there is multifocal irregularity of the intrahepatic bile ducts with areas of dilation and stenosis, including severe dilation of a bile duct in the posterior right hepatic lobe with layering stones/sludge.    Remainder of the abdomen: Spleen measures 13.9 cm in length. Normal appearance of the adrenal glands. Few tiny T2 hyperintense cortical renal cysts. Mild circumferential wall thickening and submucosal edema of the ascending colon and a few jejunal loops may represent portal hypertensive enteropathy/colopathy. Small volume ascites, mainly in the perihepatic region. Stable enlarged ranjit hepatis and retroperitoneal lymph nodes, the largest a 4.4 x 2.5 cm heterogeneous ranjit hepatis lymph node on series 18 image 45. Normal caliber aorta. Recanalized paraumbilical vein and esophageal varices are noted.  Bones: No worrisome finding identified.  CHEST: Limited evaluation is unremarkable.    CT Abd/Pelvis w/ contrast 6/10/2021  Abdomen/pelvis:  Cirrhotic configuration of the liver similar to prior. Biliary stents  in the left and right lobes, the left which is new from prior CT.  Scattered cystic biliary dilation some of which appears similar to  prior and other areas which appear improved from prior. There is a 4.8  x 2.8 cm cystic focus in hepatic segment 7 on series 3 image 103 which  previously measured 6.2 x 4.5 cm. Cystic focus in the caudate lobe  measures up to 4.3 cm the previously 4.4 cm. Many of the dilated ducts  contain hyperdense sludge. No progression of the biliary ductal  dilation. No new hepatic collections. The portal system is grossly  patent.  Distended gallbladder with intraluminal air, which is decreased from  prior. The pancreas is unremarkable.  The spleen and right adrenal  gland are unremarkable. Unchanged tiny hypodense 1.0 cm left adrenal  nodule. No hydronephrosis or suspicious renal lesion.  Small and large bowel are normal caliber. Large volume ascites similar  to prior. No intraabdominal free air. Numerous portosystemic  collaterals including a number of esophageal varices. No abdominal  aortic aneurysm. Numerous enlarged periaortic lymph nodes are  unchanged from prior for instance by 1.9 cm left periaortic lymph node  on series 3 image 244.  Bones: Mild degenerative changes. No suspicious osseous lesions.  Lung bases: Bibasilar atelectasis.      Procedures:  Liver biopsy: 6/4/2012   Liver, needle biopsy:   - Chronic liver disease with bridging fibrosis and architectural     distortion, rule out primary biliary cirrhosis.    EGD: 12/1/2020  - Grade II and large (> 5 mm)                        esophageal varices.                       - Z-line regular, 40 cm from the                        incisors.                       - Portal hypertensive gastropathy.                       - Gastric erosions without bleeding.                        Biopsied.                       - Normal examined duodenum.    EUS 12/1/2020  - Many abnormal lymph nodes were                        visualized in the subcarinal                        mediastinum (level 7), lower                        paraesophageal mediastinum (level                        8L), gastrohepatic ligament (level                        18), peripancreatic region, ranjit                        hepatis region and aortocaval region.                        Fine needle aspiration performed.                        Largest lymph nodes periportal or                        aortocaval, approximately 2-3 cm.                        Portal Lymph node aspirated for                        cytology, culture, flow cytometry                       - Hyperechoic material consistent                        with sludge was  visualized                        endosonographically in the mid common                        bile duct, causing upstream dilation                        of it to up to 10 mm. Small amount of                        sludge in the gallbladder.                       - Endosonographic imaging of the                        pancreas showed sonographic changes                        consistent with mild-moderate chronic                        pancreatitis. Poorly visualized                        pancreas, specially tail    Colonoscopy: 12/23/2020  Findings:       The terminal ileum appeared normal.       A diffuse area of mildly erythematous mucosa was        found in the descending colon, in the transverse        colon, at the hepatic flexure, in the ascending colon        and in the cecum. This was biopsied with a cold        forceps for histology.       Normal mucosa was found in the rectum. Biopsies were        taken with a cold forceps for histology.       Internal hemorrhoids were found during retroflexion.       No additional abnormalities were found on        retroflexion.    ERCP 6/10/2021  Impression:          - Uncomplicated removal of paritally occluded, well                        positioned biliary stents from a patent biliary                        sphincterotomy                        - Impressive intrahepatic stone (small/black) and clot                        burden, mostly cleared by sweep and irrigation save for                        a dilated leftward (perhaps off the right main) duct                        which was not accessed due to stenosis                        - Multiple intrahepatic stenoses and stenosis involving                        the bifurcation consistent with known PSC                        - Successful placement of two 10F stents, one to the                        left and one to the right intrahepatics     Assessment and Plan:    PSC related Cirrhosis:    -While she reports she  "was initially diagnosed with PBC in 2012, reviewed the liver biopsy specifically states \"rule out PBC\".  In 2018 she was admitted with cholangitis, and at that time her diagnosis was changed to PSC.  Prior to this she had been on weight-based ursodiol at 600 mg twice daily, and is now currently taking 300 mg twice daily  - Has had 2 episodes of cholangitis (May and Alayna, May with ICU admission) and decompensation of her liver disease    Ulcerative Colitis:  -She has a remote history of reported microscopic colitis, and in the setting of progressive symptoms biopsies demonstrated changes consistent with ulcerative colitis in 2014 and again in 2019.  She has been on a 5-ASA product since that time and biopsies from December 28, 2020 did demonstrated quiesced sent disease  -She follows with Dr. London in [facility]    Cancer Risk  Of note 80% of patients with PSC will be diagnosed with UC during their lifetime.  We discussed in detail that both PSC and UC convey an increased risk of cholangiocarcinoma and colon cancer, respectively.  I also relayed that when patients have both PSC and UC, their cancer risk increases even higher.  Lifetime risk of the development of cholangiocarcinoma in patients with PSC is approximately 10% lifetime, and while there is little data to support screening, it is felt prudent to enter to patient into a regimen of routine imaging.  We also discussed the occasional use of ursodeoxycholic acid in patients with PSC, and that the data is controversial.  While some studies have shown improvement in liver tests, there has been no demonstrated benefit in delayed time to development of cirrhosis or liver transplantation.  CCA/HCC screening in patients with PSC  - Agree with screening for cholangiocarcinoma in this patient.  We would recommend the following regimen:   - YEARLY MRI/MRCP    - YEARLY abdominal ultrasound with dopplers: this should be performed every alternate 6 months between " MRIs.  This can be performed locally and should be an exam of the complete abdomen, commenting upon spleen size   - Twice yearly  and AFP (at time of imaging studies)  (please note there is no specific recommendation from the AASLD or other GI professional society to support this screening regimen.)  - Continue with yearly colonoscopy    Liver Transplant Candidacy:   - We discussed need for transplantation, organ availability and prioritization process for liver transplantation in the [place].  We discussed the guiding principals of justice and equality that dictate transplant candidacy  - We discussed the MELD score, the variables that are followed, and how the MELD score impacts transplantation  -She has completed the transplant evaluation process, and based on her clinical decompensation over the last 3 months I do believe it is appropriate to have her on the liver transplantation wait list given significant morbidity associated with her current disease state  -We will obtain MELD labs today and plan for active waitlisting    Hepatocellular Cancer Screening:   -As per above; MRI from February 4 without evidence of hepatic masses and imaging from 6/10/2021 without masses  - Recommend screening for HCC every 6 months with either abdominal ultrasound or by alternating abdominal ultrasound with EITHER a triple/quad phase CT Liver with IV contrast OR a Quad phase MRI Liver with IV contrast.  AFP levels should be checked every 6 months at time of imaging screen.    Ascites:  -She has decompensated disease and has been getting paracentesis q. Weekly  -We will increase her diuretics to Lasix 40 mg daily and spironolactone 1 mg daily  - Continue to follow a sodium restricted (<2g sodium diet)     Hepatic Encephalopathy:  -No acute issues at this time; she was educated as to the warning signs and symptoms    Esophageal Varices:   -She does have large varices on US from December 2020  -As she has refractory  ascites requiring paracentesis, felt that benefits versus risks of ongoing beta-blockade are not warranted and will discontinue carvedilol at this time    Nutrition:  As with most patients with chronic liver disease, there is a significant degree of protein malnutrition.  Dicussed need to change dietary habits to improve overall protein balance.  Discussed the importance of eating between 1.2-1.5g/kg/day lean protein like eggs, fish, chicken, nuts, and legumes, in addition to a diet rich in fresh fruits and vegetables.  Continue to follow a sodium restricted (<2g sodium diet) and discussed the need to minimize the intake of carbohydrates and sugars, to avoid obesity.   - Strongly encourage protein supplements 2-3 times daily (Boost, Ensure, [place] Instant Milk, etc.) to meet protein and caloric intake.  - Recommend a bedtime snack with protein and complex carbohydrate to minimize risk of muscle catabolism overnight    Routine Health Care in Patient with Chronic Liver Disease:  - We recommend screening for hepatitis A and B, please vaccinate if not immune  - All patients with liver disease, particularly those with cholestatic liver disease, are at an increased risk for osteoporosis.  We strongly recommend screening for Vitamin D deficiency at least twice yearly with aggressive supplementation/replacement as indicated.    - We also recommend a screening DEXA scan to evaluate for osteoporosis.  If present, should treat with calcium, Vitamin D supplementation, and recommend consideration of bisphosphonate therapy.  Also recommend follow up DEXA scans to evaluate for improvement of bone density on therapy.  - All patients with liver disease should avoid the use of Non-steroidal Anti-Inflammatory (NSAID) medications as they can cause significant injury to the kidneys in this population    Follow Up:  3 months     Thank you very much for the opportunity to participate in the care of this patient.  If you have any  further questions, please don't hesitate to contact our office.    Thomas M. Leventhal, M.D.   of Medicine  Advanced & Transplant Hepatology  [facility]        Again, thank you for allowing me to participate in the care of your patient.        Sincerely,        Thomas M. Leventhal, MD

## 2021-06-21 NOTE — PROGRESS NOTES
Date of Service: June 21, 2021      Subjective:            Berta Nava is a 55 year old female presenting for evaluation of liver disease    History of Present Illness   Berta Nava is a 55 year old female with past medical history of hypertension, microscopic colitis, ulcerative colitis, pancreatic mucinous cystadenoma, primary sclerosing cholangitis who presents with concerns of cirrhosis complicated by esophageal varices, ascites.    Since last seen she has had a rather complicated course.  She was admitted to the hospital on April 29 with concerns of bacteremia and septic shock ultimately transferred to the [facility] where she was found to have persistent bacteremia, SBP, and cholangitis.  She underwent repeat ERCP with noted overt pus from biliary tree, had intervention and stent placement.  At that time she had issues with with develops ascites that has persisted, and she has been requiring a paracentesis every 1 week.  She was again admitted June 9-12 with concerns of cholangitis.  Repeat ERCP on Alayna 10 demonstrated partially occluded stents with intrahepatic ductal stones.    Since discharge she reports that she has been feeling fairly well, however she does have abdominal discomfort related to her ascites.  She believes that she is thinking clearly and otherwise has normal memory and concentration.  She has not required lactulose to date.    She has significant concerns about her overall decline and feels that her overall quality of life is significantly diminished    History of liver disease  Notes that she was dealing with issues of fatigue and pruritus in her late 30s, and had issues with abnormal liver tests at that time.  In evaluation as to the cause of her abnormal liver test she was noted to have a pancreatic cyst.  She underwent further evaluation for the pancreatic cyst which demonstrated to be a mucinous cystadenoma, but reports that there was less focus on her overall  liver disease.  She ultimately presented with worsening of her liver tests in 2012, underwent a liver biopsy at that time which demonstrated bridging fibrosis and changes consistent with PBC.  She was started on ursodiol at that time.  In approximately 2014 she was having issues with diarrhea and demonstrated mild changes concerning for ulcerative colitis.  In March 2019 she had issues of bright red blood per rectum and a colonoscopy at that time demonstrated overt changes of ulcerative colitis.  She had a surveillance colonoscopy most recently on December 28, 2020 and surveillance biopsies did not demonstrate any activity of disease as the patient is currently on long-acting mesalamine.    She has had some issues with ascites over the past year, and was started on spironolactone in addition to her baseline hydrochlorothiazide in October 2020.  She follows her weights closely and does feel that she has more bloating or increased girth of her abdomen at this time.  Imaging had demonstrated large lymphadenopathy and she underwent an EUS for further evaluation December 1, 2020.  That exam demonstrated large esophageal varices and the FNA from that exam demonstrated noncaseating granulomas.  She has been seen by pulmonary and the concern is that she may have sarcoidosis.    Past Medical History:  Past Medical History:   Diagnosis Date     Ascites      Biliary cirrhosis (H)      Cholangitis, sclerosing      Cirrhosis of liver with ascites (H) 3/3/2021     Hearing loss of left ear     wears a hearing aide     History of low potassium      Hyperlipidemia      Hypertension      SBP (spontaneous bacterial peritonitis) (H) 4/30/2021     Sjogren's syndrome (H)      Ulcerative colitis (H)      Ulcerative pancolitis (H)        Surgical History:  Past Surgical History:   Procedure Laterality Date     ENDOSCOPIC RETROGRADE CHOLANGIOPANCREATOGRAPHY, EXCHANGE TUBE/STENT N/A 05/05/2021    Procedure: ENDOSCOPIC RETROGRADE  CHOLANGIOPANCREATOGRAPHY WITH STENT EXCHANGE, STONE EXTRACTION, AND DILATION;  Surgeon: Gregory Gabriel MD;  Location: [facility] OR     ENDOSCOPIC RETROGRADE CHOLANGIOPANCREATOGRAPHY, EXCHANGE TUBE/STENT N/A 5/10/2021    Procedure: ENDOSCOPIC RETROGRADE CHOLANGIOPANCREATOGRAPHY with biliary dilation, stone removal, stent exchange;  Surgeon: Gregory Gabriel MD;  Location: [facility] OR     ENDOSCOPIC RETROGRADE CHOLANGIOPANCREATOGRAPHY, EXCHANGE TUBE/STENT N/A 6/10/2021    Procedure: ENDOSCOPIC RETROGRADE CHOLANGIOPANCREATOGRAPHY, WITH biliary stent exchange, stone removal;  Surgeon: Woodrow Lott MD;  Location: [facility] OR     GYN SURGERY      bilat fallopian tubes and ovaries removed     PICC DOUBLE LUMEN PLACEMENT Right 2021    42cm (2cm external), Lateral brachial vein   - BSO    Social History:  Social History     Tobacco Use     Smoking status: Never Smoker     Smokeless tobacco: Never Used   Substance Use Topics     Alcohol use: No     Comment: Last drink was      Drug use: No       Family History:  -Denies a family history of overt liver disease.  Notes that her mother  of complications of an angiosarcoma.  Her father had coronary artery disease with a heart attack at the age of 46.  Ultimately he underwent a heart transplant and lived for 15 years, dying in his 70s.  There is no family history of ulcerative colitis, Crohn's disease.  Unclear history of thyroid disease.    Medications:  Current Outpatient Medications   Medication     calcium carbonate 600 mg-vitamin D 400 units (CALTRATE) 600-400 MG-UNIT per tablet     ciprofloxacin (CIPRO) 250 MG tablet     furosemide (LASIX) 20 MG tablet     mesalamine (LIALDA) 1.2 g EC tablet     multivitamin (CENTRUM SILVER) tablet     simvastatin (ZOCOR) 20 MG tablet     spironolactone (ALDACTONE) 50 MG tablet     sulfamethoxazole-trimethoprim (BACTRIM DS) 800-160 MG tablet     ursodiol (ACTIGALL) 300 MG capsule     lisinopril (ZESTRIL) 10 MG tablet     No current  "facility-administered medications for this visit.        Review of Systems  A complete 10 point review of systems was asked and answered in the negative unless specifically commented upon in the HPI    Objective:         Vitals:    06/21/21 0752   BP: 93/66   Pulse: 77   Temp: 97.8  F (36.6  C)   TempSrc: Oral   SpO2: 100%   Weight: 65.5 kg (144 lb 8 oz)   Height: 1.651 m (5' 5\")     Body mass index is 24.05 kg/m .     Physical Exam  Constitutional: Well-developed, well-nourished, in no apparent distress.    HEENT: Normocephalic. faint scleral icterus. Moist oral mucosa. Dentition normal  Neck/Lymph: Normal ROM  Cardiac:  Regular rate and rhythm.  Respiratory: Normal respiratory excursion   GI:  Abdomen soft, distended, non-tender. BS present.  Skin:  Skin is warm and dry. No rash noted.  Peripheral Vascular: no lower extremity edema. 2+ pulses in all extremities  Musculoskeletal:  ROM intact, normal muscle bulk    Psychiatric: Normal mood and affect. Behavior is normal.  Neuro:  no asterixis, no tremor    Labs and Diagnostic tests:    MELD-Na score: 17 at 6/11/2021 11:34 AM  MELD score: 13 at 6/11/2021 11:34 AM  Calculated from:  Serum Creatinine: 0.79 mg/dL (Rounded to 1 mg/dL) at 6/11/2021 11:34 AM  Serum Sodium: 132 mmol/L at 6/11/2021 11:34 AM  Total Bilirubin: 1.1 mg/dL at 6/11/2021 11:34 AM  INR(ratio): 1.65 at 6/9/2021  8:07 PM  Age: 55 years 9 months    Imaging:  MRI w/wo IV 2/4/2021  FINDINGS: Image quality is slightly degraded by patient motion.    Liver: Nodular liver surface with diffusely heterogeneous parenchyma, including scattered linear and wedge-shaped areas of T2 hyperintensity. On T1-weighted imaging, there are multiple branching T1 hyperintense areas which are not enhancing on the postcontrast images. No focal liver lesion is identified.    Biliary tree/gallbladder: Hydropic gallbladder with a few small layering gallstones, a few of which are located in the cystic duct. As seen previously, " there is multifocal irregularity of the intrahepatic bile ducts with areas of dilation and stenosis, including severe dilation of a bile duct in the posterior right hepatic lobe with layering stones/sludge.    Remainder of the abdomen: Spleen measures 13.9 cm in length. Normal appearance of the adrenal glands. Few tiny T2 hyperintense cortical renal cysts. Mild circumferential wall thickening and submucosal edema of the ascending colon and a few jejunal loops may represent portal hypertensive enteropathy/colopathy. Small volume ascites, mainly in the perihepatic region. Stable enlarged ranjit hepatis and retroperitoneal lymph nodes, the largest a 4.4 x 2.5 cm heterogeneous ranjit hepatis lymph node on series 18 image 45. Normal caliber aorta. Recanalized paraumbilical vein and esophageal varices are noted.  Bones: No worrisome finding identified.  CHEST: Limited evaluation is unremarkable.    CT Abd/Pelvis w/ contrast 6/10/2021  Abdomen/pelvis:  Cirrhotic configuration of the liver similar to prior. Biliary stents  in the left and right lobes, the left which is new from prior CT.  Scattered cystic biliary dilation some of which appears similar to  prior and other areas which appear improved from prior. There is a 4.8  x 2.8 cm cystic focus in hepatic segment 7 on series 3 image 103 which  previously measured 6.2 x 4.5 cm. Cystic focus in the caudate lobe  measures up to 4.3 cm the previously 4.4 cm. Many of the dilated ducts  contain hyperdense sludge. No progression of the biliary ductal  dilation. No new hepatic collections. The portal system is grossly  patent.  Distended gallbladder with intraluminal air, which is decreased from  prior. The pancreas is unremarkable. The spleen and right adrenal  gland are unremarkable. Unchanged tiny hypodense 1.0 cm left adrenal  nodule. No hydronephrosis or suspicious renal lesion.  Small and large bowel are normal caliber. Large volume ascites similar  to prior. No  intraabdominal free air. Numerous portosystemic  collaterals including a number of esophageal varices. No abdominal  aortic aneurysm. Numerous enlarged periaortic lymph nodes are  unchanged from prior for instance by 1.9 cm left periaortic lymph node  on series 3 image 244.  Bones: Mild degenerative changes. No suspicious osseous lesions.  Lung bases: Bibasilar atelectasis.      Procedures:  Liver biopsy: 6/4/2012   Liver, needle biopsy:   - Chronic liver disease with bridging fibrosis and architectural     distortion, rule out primary biliary cirrhosis.    EGD: 12/1/2020  - Grade II and large (> 5 mm)                        esophageal varices.                       - Z-line regular, 40 cm from the                        incisors.                       - Portal hypertensive gastropathy.                       - Gastric erosions without bleeding.                        Biopsied.                       - Normal examined duodenum.    EUS 12/1/2020  - Many abnormal lymph nodes were                        visualized in the subcarinal                        mediastinum (level 7), lower                        paraesophageal mediastinum (level                        8L), gastrohepatic ligament (level                        18), peripancreatic region, ranjit                        hepatis region and aortocaval region.                        Fine needle aspiration performed.                        Largest lymph nodes periportal or                        aortocaval, approximately 2-3 cm.                        Portal Lymph node aspirated for                        cytology, culture, flow cytometry                       - Hyperechoic material consistent                        with sludge was visualized                        endosonographically in the mid common                        bile duct, causing upstream dilation                        of it to up to 10 mm. Small amount of                        sludge in the  "gallbladder.                       - Endosonographic imaging of the                        pancreas showed sonographic changes                        consistent with mild-moderate chronic                        pancreatitis. Poorly visualized                        pancreas, specially tail    Colonoscopy: 12/23/2020  Findings:       The terminal ileum appeared normal.       A diffuse area of mildly erythematous mucosa was        found in the descending colon, in the transverse        colon, at the hepatic flexure, in the ascending colon        and in the cecum. This was biopsied with a cold        forceps for histology.       Normal mucosa was found in the rectum. Biopsies were        taken with a cold forceps for histology.       Internal hemorrhoids were found during retroflexion.       No additional abnormalities were found on        retroflexion.    ERCP 6/10/2021  Impression:          - Uncomplicated removal of paritally occluded, well                        positioned biliary stents from a patent biliary                        sphincterotomy                        - Impressive intrahepatic stone (small/black) and clot                        burden, mostly cleared by sweep and irrigation save for                        a dilated leftward (perhaps off the right main) duct                        which was not accessed due to stenosis                        - Multiple intrahepatic stenoses and stenosis involving                        the bifurcation consistent with known PSC                        - Successful placement of two 10F stents, one to the                        left and one to the right intrahepatics     Assessment and Plan:    PSC related Cirrhosis:    -While she reports she was initially diagnosed with PBC in 2012, reviewed the liver biopsy specifically states \"rule out PBC\".  In 2018 she was admitted with cholangitis, and at that time her diagnosis was changed to PSC.  Prior to this she had been on " weight-based ursodiol at 600 mg twice daily, and is now currently taking 300 mg twice daily  - Has had 2 episodes of cholangitis (May and Alayna, May with ICU admission) and decompensation of her liver disease    Ulcerative Colitis:  -She has a remote history of reported microscopic colitis, and in the setting of progressive symptoms biopsies demonstrated changes consistent with ulcerative colitis in 2014 and again in 2019.  She has been on a 5-ASA product since that time and biopsies from December 28, 2020 did demonstrated quiesced sent disease  -She follows with Dr. London in the [facility]    Cancer Risk  Of note 80% of patients with PSC will be diagnosed with UC during their lifetime.  We discussed in detail that both PSC and UC convey an increased risk of cholangiocarcinoma and colon cancer, respectively.  I also relayed that when patients have both PSC and UC, their cancer risk increases even higher.  Lifetime risk of the development of cholangiocarcinoma in patients with PSC is approximately 10% lifetime, and while there is little data to support screening, it is felt prudent to enter to patient into a regimen of routine imaging.  We also discussed the occasional use of ursodeoxycholic acid in patients with PSC, and that the data is controversial.  While some studies have shown improvement in liver tests, there has been no demonstrated benefit in delayed time to development of cirrhosis or liver transplantation.  CCA/HCC screening in patients with PSC  - Agree with screening for cholangiocarcinoma in this patient.  We would recommend the following regimen:   - YEARLY MRI/MRCP    - YEARLY abdominal ultrasound with dopplers: this should be performed every alternate 6 months between MRIs.  This can be performed locally and should be an exam of the complete abdomen, commenting upon spleen size   - Twice yearly  and AFP (at time of imaging studies)  (please note there is no specific recommendation from the  AASLD or other GI professional society to support this screening regimen.)  - Continue with yearly colonoscopy    Liver Transplant Candidacy:   - We discussed need for transplantation, organ availability and prioritization process for liver transplantation in the [place].  We discussed the guiding principals of justice and equality that dictate transplant candidacy  - We discussed the MELD score, the variables that are followed, and how the MELD score impacts transplantation  -She has completed the transplant evaluation process, and based on her clinical decompensation over the last 3 months I do believe it is appropriate to have her on the liver transplantation wait list given significant morbidity associated with her current disease state  -We will obtain MELD labs today and plan for active waitlisting    Hepatocellular Cancer Screening:   -As per above; MRI from February 4 without evidence of hepatic masses and imaging from 6/10/2021 without masses  - Recommend screening for HCC every 6 months with either abdominal ultrasound or by alternating abdominal ultrasound with EITHER a triple/quad phase CT Liver with IV contrast OR a Quad phase MRI Liver with IV contrast.  AFP levels should be checked every 6 months at time of imaging screen.    Ascites:  -She has decompensated disease and has been getting paracentesis q. Weekly  -We will increase her diuretics to Lasix 40 mg daily and spironolactone 1 mg daily  - Continue to follow a sodium restricted (<2g sodium diet)     Hepatic Encephalopathy:  -No acute issues at this time; she was educated as to the warning signs and symptoms    Esophageal Varices:   -She does have large varices on US from December 2020  -As she has refractory ascites requiring paracentesis, felt that benefits versus risks of ongoing beta-blockade are not warranted and will discontinue carvedilol at this time    Nutrition:  As with most patients with chronic liver disease, there is a  significant degree of protein malnutrition.  Dicussed need to change dietary habits to improve overall protein balance.  Discussed the importance of eating between 1.2-1.5g/kg/day lean protein like eggs, fish, chicken, nuts, and legumes, in addition to a diet rich in fresh fruits and vegetables.  Continue to follow a sodium restricted (<2g sodium diet) and discussed the need to minimize the intake of carbohydrates and sugars, to avoid obesity.   - Strongly encourage protein supplements 2-3 times daily (Boost, Ensure, [place] Instant Milk, etc.) to meet protein and caloric intake.  - Recommend a bedtime snack with protein and complex carbohydrate to minimize risk of muscle catabolism overnight    Routine Health Care in Patient with Chronic Liver Disease:  - We recommend screening for hepatitis A and B, please vaccinate if not immune  - All patients with liver disease, particularly those with cholestatic liver disease, are at an increased risk for osteoporosis.  We strongly recommend screening for Vitamin D deficiency at least twice yearly with aggressive supplementation/replacement as indicated.    - We also recommend a screening DEXA scan to evaluate for osteoporosis.  If present, should treat with calcium, Vitamin D supplementation, and recommend consideration of bisphosphonate therapy.  Also recommend follow up DEXA scans to evaluate for improvement of bone density on therapy.  - All patients with liver disease should avoid the use of Non-steroidal Anti-Inflammatory (NSAID) medications as they can cause significant injury to the kidneys in this population    Follow Up:  3 months     Thank you very much for the opportunity to participate in the care of this patient.  If you have any further questions, please don't hesitate to contact our office.    Thomas M. Leventhal, M.D.   of Medicine  Advanced & Transplant Hepatology  [facility]

## 2021-06-21 NOTE — NURSING NOTE
"Chief Complaint   Patient presents with     RECHECK     cirrhosis of  liver     BP 93/66   Pulse 77   Temp 97.8  F (36.6  C) (Oral)   Ht 1.651 m (5' 5\")   Wt 65.5 kg (144 lb 8 oz)   SpO2 100%   BMI 24.05 kg/m       Nam Mccarthy MA  "

## 2021-06-22 ENCOUNTER — TELEPHONE (OUTPATIENT)
Dept: TRANSPLANT | Facility: CLINIC | Age: 56
End: 2021-06-22

## 2021-06-22 NOTE — TELEPHONE ENCOUNTER
Liver Transplant Evaluation/Teaching    TEACHING TOPICS: Evaluation Process, Evaluation Items, Diagnostic Studies, Consultation, Chemical Dependency Policy, CD Eval, Donor Source, Liver Allocation, MELD System, UNOS, Waiting List, Follow up while on transplant list, Follow up after transplantation, Infection and Rejection, Immunosuppression , Medication Teaching, Lab Recording after transplant, Laboratory Frequency after transplant , Consent for evaluation and One year survival rates  INSTRUCTIONAL MATERIAL USED/GIVEN: Liver Transplant Handbook, MELD Booklet, Donor Booklet, Web Sites Options, Verbal instructions, Multiple Listing Brochure , Consent for evaluation signed, One year survival rates and SRTR (Scientific Registry) Data  Person(s) involved in teaching: patient  Asks Questions: YES  Eager to Learn: YES  Cooperative: YES  Receptive (willing/able to accept information): YES  Reason for the appointment, diagnosis and treatment plan:YES  Knowledge of proper use of medications and conditions for which they are ordered (with special attention to potential side effects or drug interactions): YES  Which situations necessitate calling provider and whom to contact:YES  Other:   Teaching Concerns Addressed   Comments: MD, lab, and medication compliance. Proper exercise and nutrition. No alcohol or smoking.  Proper use and care of (medical equip, care aids, etc.): n/a  Nutritional needs and diet plan: YES  Pain management techniques: n/a  Wound Care: n/a  How and/when to access community resources: YES  Patient is aware of and agrees to required commitment to post-op care and long term follow-up: YES  Patient has name and phone number of transplant coordinator.   Time Spent face-to-face teachin minutes.  Wilmer Lazar Jr., BSN, RN  Liver Transplant Coordinator  [phone]

## 2021-06-23 NOTE — PROGRESS NOTES
This is a recent snapshot of the patient's [facility] medical record.  For current drug dose and complete information and questions, call [phone] or In Basket pool, [facility] ([id number])  CSN Number:  [id number]

## 2021-06-24 ENCOUNTER — HOSPITAL ENCOUNTER (OUTPATIENT)
Dept: ULTRASOUND IMAGING | Facility: CLINIC | Age: 56
Discharge: HOME | End: 2021-06-24
Attending: INTERNAL MEDICINE | Admitting: INTERNAL MEDICINE
Payer: COMMERCIAL

## 2021-06-24 VITALS — HEART RATE: 90 BPM | SYSTOLIC BLOOD PRESSURE: 121 MMHG | DIASTOLIC BLOOD PRESSURE: 80 MMHG

## 2021-06-24 DIAGNOSIS — K74.60 CIRRHOSIS OF LIVER WITH ASCITES, UNSPECIFIED HEPATIC CIRRHOSIS TYPE (H): ICD-10-CM

## 2021-06-24 DIAGNOSIS — R18.8 CIRRHOSIS OF LIVER WITH ASCITES, UNSPECIFIED HEPATIC CIRRHOSIS TYPE (H): ICD-10-CM

## 2021-06-24 PROCEDURE — 49083 ABD PARACENTESIS W/IMAGING: CPT

## 2021-06-24 PROCEDURE — 250N000011 HC RX IP 250 OP 636

## 2021-06-24 PROCEDURE — 250N000009 HC RX 250: Mod: JW | Performed by: RADIOLOGY

## 2021-06-24 PROCEDURE — P9047 ALBUMIN (HUMAN), 25%, 50ML: HCPCS

## 2021-06-24 RX ORDER — ALBUMIN (HUMAN) 12.5 G/50ML
25 SOLUTION INTRAVENOUS ONCE
Status: COMPLETED | OUTPATIENT
Start: 2021-06-24 | End: 2021-06-24

## 2021-06-24 RX ORDER — ALBUMIN (HUMAN) 12.5 G/50ML
SOLUTION INTRAVENOUS
Status: COMPLETED
Start: 2021-06-24 | End: 2021-06-24

## 2021-06-24 RX ORDER — LIDOCAINE HYDROCHLORIDE 10 MG/ML
10 INJECTION, SOLUTION EPIDURAL; INFILTRATION; INTRACAUDAL; PERINEURAL ONCE
Status: COMPLETED | OUTPATIENT
Start: 2021-06-24 | End: 2021-06-24

## 2021-06-24 RX ADMIN — ALBUMIN (HUMAN) 25 G: 12.5 SOLUTION INTRAVENOUS at 15:21

## 2021-06-24 RX ADMIN — LIDOCAINE HYDROCHLORIDE 10 ML: 10 INJECTION, SOLUTION EPIDURAL; INFILTRATION; INTRACAUDAL; PERINEURAL at 15:21

## 2021-06-24 RX ADMIN — ALBUMIN HUMAN 25 G: 0.25 SOLUTION INTRAVENOUS at 15:21

## 2021-06-24 NOTE — PROCEDURES
[facility]    Procedure: US guided paracentesis    Date/Time: 6/24/2021 3:09 PM  Performed by: Rick Richards MD  Authorized by: Rick Richards MD     UNIVERSAL PROTOCOL   Site Marked: Yes  Prior Images Obtained and Reviewed:  Yes  Required items: Required blood products, implants, devices and special equipment available    Patient identity confirmed:  Verbally with patient, arm band and provided demographic data  NA - No sedation, light sedation, or local anesthesia  Confirmation Checklist:  Patient's identity using two indicators, relevant allergies, procedure was appropriate and matched the consent or emergent situation and correct equipment/implants were available  Time out: Immediately prior to the procedure a time out was called    Universal Protocol: the Joint Commission Universal Protocol was followed    Preparation: Patient was prepped and draped in usual sterile fashion    ESBL (mL):  0         ANESTHESIA    Anesthesia: Local infiltration  Local Anesthetic:  Lidocaine 1% without epinephrine  Anesthetic Total (mL):  10      SEDATION    Patient Sedated: No    See dictated procedure note for full details.  PROCEDURE   Patient Tolerance:  Patient tolerated the procedure well with no immediate complications  Describe Procedure: LLQ Paracentesis for therapeutic intent  Length of time physician/provider present for 1:1 monitoring during sedation: 0

## 2021-06-24 NOTE — PROGRESS NOTES
Pt was in Radiology today for a paracentesis. Procedure performed by Dr Richards Procedure was tolerated well, vitals remained stable.5700 cc's of  Slightly cloudy yellowcolored fluid removed. 25 G Albumin instilled per protocol. Written and verbal instructions were reviewed here is no evidence of bleeding upon discharge.  Pt left department in stable satisfactory condition.

## 2021-06-25 ENCOUNTER — HOSPITAL ENCOUNTER (OUTPATIENT)
Facility: CLINIC | Age: 56
Discharge: HOME | End: 2021-06-25
Attending: INTERNAL MEDICINE | Admitting: INTERNAL MEDICINE
Payer: COMMERCIAL

## 2021-06-25 ENCOUNTER — ANESTHESIA (OUTPATIENT)
Dept: SURGERY | Facility: CLINIC | Age: 56
End: 2021-06-25
Payer: COMMERCIAL

## 2021-06-25 ENCOUNTER — DOCUMENTATION ONLY (OUTPATIENT)
Dept: TRANSPLANT | Facility: CLINIC | Age: 56
End: 2021-06-25

## 2021-06-25 ENCOUNTER — APPOINTMENT (OUTPATIENT)
Dept: GENERAL RADIOLOGY | Facility: CLINIC | Age: 56
End: 2021-06-25
Attending: INTERNAL MEDICINE
Payer: COMMERCIAL

## 2021-06-25 ENCOUNTER — ANESTHESIA EVENT (OUTPATIENT)
Dept: SURGERY | Facility: CLINIC | Age: 56
End: 2021-06-25
Payer: COMMERCIAL

## 2021-06-25 VITALS
TEMPERATURE: 98 F | BODY MASS INDEX: 22.44 KG/M2 | DIASTOLIC BLOOD PRESSURE: 68 MMHG | WEIGHT: 134.7 LBS | HEIGHT: 65 IN | HEART RATE: 80 BPM | SYSTOLIC BLOOD PRESSURE: 109 MMHG | RESPIRATION RATE: 20 BRPM | OXYGEN SATURATION: 99 %

## 2021-06-25 DIAGNOSIS — Z46.89 ENCOUNTER FOR REPLACEMENT OF BILIARY STENT: ICD-10-CM

## 2021-06-25 DIAGNOSIS — K83.09 CHOLANGITIS (H): Primary | ICD-10-CM

## 2021-06-25 DIAGNOSIS — K83.01 PSC (PRIMARY SCLEROSING CHOLANGITIS) (H): ICD-10-CM

## 2021-06-25 LAB
ALBUMIN SERPL-MCNC: 2.8 G/DL (ref 3.4–5)
ALP SERPL-CCNC: 386 U/L (ref 40–150)
ALT SERPL W P-5'-P-CCNC: 32 U/L (ref 0–50)
AMYLASE SERPL-CCNC: 66 U/L (ref 30–110)
ANION GAP SERPL CALCULATED.3IONS-SCNC: 7 MMOL/L (ref 3–14)
AST SERPL W P-5'-P-CCNC: 58 U/L (ref 0–45)
BILIRUB SERPL-MCNC: 3.3 MG/DL (ref 0.2–1.3)
BUN SERPL-MCNC: 23 MG/DL (ref 7–30)
CALCIUM SERPL-MCNC: 9.4 MG/DL (ref 8.5–10.1)
CHLORIDE SERPL-SCNC: 100 MMOL/L (ref 94–109)
CO2 SERPL-SCNC: 21 MMOL/L (ref 20–32)
CREAT SERPL-MCNC: 0.69 MG/DL (ref 0.52–1.04)
ERCP: NORMAL
ERYTHROCYTE [DISTWIDTH] IN BLOOD BY AUTOMATED COUNT: 16.4 % (ref 10–15)
GFR SERPL CREATININE-BSD FRML MDRD: >90 ML/MIN/{1.73_M2}
GLUCOSE BLDC GLUCOMTR-MCNC: 95 MG/DL (ref 70–99)
GLUCOSE SERPL-MCNC: 108 MG/DL (ref 70–99)
HCT VFR BLD AUTO: 32.9 % (ref 35–47)
HGB BLD-MCNC: 10.7 G/DL (ref 11.7–15.7)
INR PPP: 1.39 (ref 0.86–1.14)
LIPASE SERPL-CCNC: 241 U/L (ref 73–393)
MCH RBC QN AUTO: 30 PG (ref 26.5–33)
MCHC RBC AUTO-ENTMCNC: 32.5 G/DL (ref 31.5–36.5)
MCV RBC AUTO: 92 FL (ref 78–100)
PLATELET # BLD AUTO: 444 10E9/L (ref 150–450)
POTASSIUM SERPL-SCNC: 4.4 MMOL/L (ref 3.4–5.3)
PROT SERPL-MCNC: 7.4 G/DL (ref 6.8–8.8)
RBC # BLD AUTO: 3.57 10E12/L (ref 3.8–5.2)
SODIUM SERPL-SCNC: 128 MMOL/L (ref 133–144)
WBC # BLD AUTO: 15.2 10E9/L (ref 4–11)

## 2021-06-25 PROCEDURE — 258N000003 HC RX IP 258 OP 636: Performed by: ANESTHESIOLOGY

## 2021-06-25 PROCEDURE — 250N000025 HC SEVOFLURANE, PER MIN: Performed by: INTERNAL MEDICINE

## 2021-06-25 PROCEDURE — 258N000003 HC RX IP 258 OP 636: Performed by: NURSE ANESTHETIST, CERTIFIED REGISTERED

## 2021-06-25 PROCEDURE — 272N000001 HC OR GENERAL SUPPLY STERILE: Performed by: INTERNAL MEDICINE

## 2021-06-25 PROCEDURE — 36415 COLL VENOUS BLD VENIPUNCTURE: CPT | Performed by: STUDENT IN AN ORGANIZED HEALTH CARE EDUCATION/TRAINING PROGRAM

## 2021-06-25 PROCEDURE — C1726 CATH, BAL DIL, NON-VASCULAR: HCPCS | Performed by: INTERNAL MEDICINE

## 2021-06-25 PROCEDURE — C1725 CATH, TRANSLUMIN NON-LASER: HCPCS | Performed by: INTERNAL MEDICINE

## 2021-06-25 PROCEDURE — 710N000010 HC RECOVERY PHASE 1, LEVEL 2, PER MIN: Performed by: INTERNAL MEDICINE

## 2021-06-25 PROCEDURE — 250N000011 HC RX IP 250 OP 636: Performed by: NURSE ANESTHETIST, CERTIFIED REGISTERED

## 2021-06-25 PROCEDURE — 85610 PROTHROMBIN TIME: CPT | Performed by: STUDENT IN AN ORGANIZED HEALTH CARE EDUCATION/TRAINING PROGRAM

## 2021-06-25 PROCEDURE — 80053 COMPREHEN METABOLIC PANEL: CPT | Performed by: STUDENT IN AN ORGANIZED HEALTH CARE EDUCATION/TRAINING PROGRAM

## 2021-06-25 PROCEDURE — 255N000002 HC RX 255 OP 636: Performed by: INTERNAL MEDICINE

## 2021-06-25 PROCEDURE — 360N000083 HC SURGERY LEVEL 3 W/ FLUORO, PER MIN: Performed by: INTERNAL MEDICINE

## 2021-06-25 PROCEDURE — 83690 ASSAY OF LIPASE: CPT | Performed by: STUDENT IN AN ORGANIZED HEALTH CARE EDUCATION/TRAINING PROGRAM

## 2021-06-25 PROCEDURE — 85027 COMPLETE CBC AUTOMATED: CPT | Performed by: STUDENT IN AN ORGANIZED HEALTH CARE EDUCATION/TRAINING PROGRAM

## 2021-06-25 PROCEDURE — 999N000179 XR SURGERY CARM FLUORO LESS THAN 5 MIN W STILLS: Mod: TC

## 2021-06-25 PROCEDURE — 999N000141 HC STATISTIC PRE-PROCEDURE NURSING ASSESSMENT: Performed by: INTERNAL MEDICINE

## 2021-06-25 PROCEDURE — C1876 STENT, NON-COA/NON-COV W/DEL: HCPCS | Performed by: INTERNAL MEDICINE

## 2021-06-25 PROCEDURE — C1769 GUIDE WIRE: HCPCS | Performed by: INTERNAL MEDICINE

## 2021-06-25 PROCEDURE — 250N000009 HC RX 250: Performed by: NURSE ANESTHETIST, CERTIFIED REGISTERED

## 2021-06-25 PROCEDURE — 82150 ASSAY OF AMYLASE: CPT | Performed by: STUDENT IN AN ORGANIZED HEALTH CARE EDUCATION/TRAINING PROGRAM

## 2021-06-25 PROCEDURE — 250N000009 HC RX 250: Performed by: STUDENT IN AN ORGANIZED HEALTH CARE EDUCATION/TRAINING PROGRAM

## 2021-06-25 PROCEDURE — 82962 GLUCOSE BLOOD TEST: CPT

## 2021-06-25 PROCEDURE — 710N000012 HC RECOVERY PHASE 2, PER MINUTE: Performed by: INTERNAL MEDICINE

## 2021-06-25 PROCEDURE — 360N000082 HC SURGERY LEVEL 2 W/ FLUORO, PER MIN: Performed by: INTERNAL MEDICINE

## 2021-06-25 PROCEDURE — 370N000017 HC ANESTHESIA TECHNICAL FEE, PER MIN: Performed by: INTERNAL MEDICINE

## 2021-06-25 PROCEDURE — 250N000009 HC RX 250: Performed by: INTERNAL MEDICINE

## 2021-06-25 DEVICE — STENT JOHLIN PANCREA WEDGE 10FRX22CM W/INTRO G26828
Type: IMPLANTABLE DEVICE | Site: BILE DUCT | Status: NON-FUNCTIONAL
Removed: 2021-07-22

## 2021-06-25 RX ORDER — POTASSIUM CHLORIDE 750 MG/1
10 CAPSULE, EXTENDED RELEASE ORAL AT BEDTIME
Status: ON HOLD | COMMUNITY
Start: 2018-06-25 | End: 2021-09-09

## 2021-06-25 RX ORDER — LIDOCAINE 40 MG/G
CREAM TOPICAL
Status: DISCONTINUED | OUTPATIENT
Start: 2021-06-25 | End: 2021-06-25 | Stop reason: HOSPADM

## 2021-06-25 RX ORDER — NALOXONE HYDROCHLORIDE 0.4 MG/ML
0.4 INJECTION, SOLUTION INTRAMUSCULAR; INTRAVENOUS; SUBCUTANEOUS
Status: DISCONTINUED | OUTPATIENT
Start: 2021-06-25 | End: 2021-06-25 | Stop reason: HOSPADM

## 2021-06-25 RX ORDER — ONDANSETRON 4 MG/1
4 TABLET, ORALLY DISINTEGRATING ORAL EVERY 30 MIN PRN
Status: DISCONTINUED | OUTPATIENT
Start: 2021-06-25 | End: 2021-06-25 | Stop reason: HOSPADM

## 2021-06-25 RX ORDER — NALOXONE HYDROCHLORIDE 0.4 MG/ML
0.2 INJECTION, SOLUTION INTRAMUSCULAR; INTRAVENOUS; SUBCUTANEOUS
Status: DISCONTINUED | OUTPATIENT
Start: 2021-06-25 | End: 2021-06-25 | Stop reason: HOSPADM

## 2021-06-25 RX ORDER — SODIUM CHLORIDE, SODIUM LACTATE, POTASSIUM CHLORIDE, CALCIUM CHLORIDE 600; 310; 30; 20 MG/100ML; MG/100ML; MG/100ML; MG/100ML
INJECTION, SOLUTION INTRAVENOUS CONTINUOUS
Status: DISCONTINUED | OUTPATIENT
Start: 2021-06-25 | End: 2021-06-25 | Stop reason: HOSPADM

## 2021-06-25 RX ORDER — LEVOFLOXACIN 5 MG/ML
INJECTION, SOLUTION INTRAVENOUS PRN
Status: DISCONTINUED | OUTPATIENT
Start: 2021-06-25 | End: 2021-06-25

## 2021-06-25 RX ORDER — FENTANYL CITRATE 50 UG/ML
INJECTION, SOLUTION INTRAMUSCULAR; INTRAVENOUS PRN
Status: DISCONTINUED | OUTPATIENT
Start: 2021-06-25 | End: 2021-06-25

## 2021-06-25 RX ORDER — PROPOFOL 10 MG/ML
INJECTION, EMULSION INTRAVENOUS PRN
Status: DISCONTINUED | OUTPATIENT
Start: 2021-06-25 | End: 2021-06-25

## 2021-06-25 RX ORDER — LEVOFLOXACIN 500 MG/1
500 TABLET, FILM COATED ORAL DAILY
Qty: 5 TABLET | Refills: 0 | Status: SHIPPED | OUTPATIENT
Start: 2021-06-25 | End: 2021-06-30

## 2021-06-25 RX ORDER — INDOMETHACIN 50 MG/1
100 SUPPOSITORY RECTAL
Status: COMPLETED | OUTPATIENT
Start: 2021-06-25 | End: 2021-06-25

## 2021-06-25 RX ORDER — IOPAMIDOL 510 MG/ML
INJECTION, SOLUTION INTRAVASCULAR PRN
Status: DISCONTINUED | OUTPATIENT
Start: 2021-06-25 | End: 2021-06-25 | Stop reason: HOSPADM

## 2021-06-25 RX ORDER — FENTANYL CITRATE 50 UG/ML
25-50 INJECTION, SOLUTION INTRAMUSCULAR; INTRAVENOUS
Status: DISCONTINUED | OUTPATIENT
Start: 2021-06-25 | End: 2021-06-25 | Stop reason: HOSPADM

## 2021-06-25 RX ORDER — HYDROMORPHONE HYDROCHLORIDE 1 MG/ML
.3-.5 INJECTION, SOLUTION INTRAMUSCULAR; INTRAVENOUS; SUBCUTANEOUS EVERY 5 MIN PRN
Status: DISCONTINUED | OUTPATIENT
Start: 2021-06-25 | End: 2021-06-25 | Stop reason: HOSPADM

## 2021-06-25 RX ORDER — LIDOCAINE HYDROCHLORIDE 20 MG/ML
INJECTION, SOLUTION INFILTRATION; PERINEURAL PRN
Status: DISCONTINUED | OUTPATIENT
Start: 2021-06-25 | End: 2021-06-25

## 2021-06-25 RX ORDER — ONDANSETRON 2 MG/ML
4 INJECTION INTRAMUSCULAR; INTRAVENOUS EVERY 30 MIN PRN
Status: DISCONTINUED | OUTPATIENT
Start: 2021-06-25 | End: 2021-06-25 | Stop reason: HOSPADM

## 2021-06-25 RX ORDER — FLUMAZENIL 0.1 MG/ML
0.2 INJECTION, SOLUTION INTRAVENOUS
Status: DISCONTINUED | OUTPATIENT
Start: 2021-06-25 | End: 2021-06-25 | Stop reason: HOSPADM

## 2021-06-25 RX ORDER — DEXAMETHASONE SODIUM PHOSPHATE 4 MG/ML
INJECTION, SOLUTION INTRA-ARTICULAR; INTRALESIONAL; INTRAMUSCULAR; INTRAVENOUS; SOFT TISSUE PRN
Status: DISCONTINUED | OUTPATIENT
Start: 2021-06-25 | End: 2021-06-25

## 2021-06-25 RX ORDER — LEVOFLOXACIN 5 MG/ML
500 INJECTION, SOLUTION INTRAVENOUS EVERY 24 HOURS
Status: DISCONTINUED | OUTPATIENT
Start: 2021-06-25 | End: 2021-06-25 | Stop reason: HOSPADM

## 2021-06-25 RX ADMIN — PROPOFOL 200 MG: 10 INJECTION, EMULSION INTRAVENOUS at 09:42

## 2021-06-25 RX ADMIN — MIDAZOLAM 2 MG: 1 INJECTION INTRAMUSCULAR; INTRAVENOUS at 09:33

## 2021-06-25 RX ADMIN — PHENYLEPHRINE HYDROCHLORIDE 100 MCG: 10 INJECTION INTRAVENOUS at 10:12

## 2021-06-25 RX ADMIN — FENTANYL CITRATE 50 MCG: 50 INJECTION, SOLUTION INTRAMUSCULAR; INTRAVENOUS at 10:17

## 2021-06-25 RX ADMIN — PHENYLEPHRINE HYDROCHLORIDE 100 MCG: 10 INJECTION INTRAVENOUS at 10:40

## 2021-06-25 RX ADMIN — SODIUM CHLORIDE, POTASSIUM CHLORIDE, SODIUM LACTATE AND CALCIUM CHLORIDE: 600; 310; 30; 20 INJECTION, SOLUTION INTRAVENOUS at 09:36

## 2021-06-25 RX ADMIN — DEXAMETHASONE SODIUM PHOSPHATE 4 MG: 4 INJECTION, SOLUTION INTRA-ARTICULAR; INTRALESIONAL; INTRAMUSCULAR; INTRAVENOUS; SOFT TISSUE at 10:02

## 2021-06-25 RX ADMIN — PHENYLEPHRINE HYDROCHLORIDE 200 MCG: 10 INJECTION INTRAVENOUS at 09:58

## 2021-06-25 RX ADMIN — LIDOCAINE HYDROCHLORIDE 100 MG: 20 INJECTION, SOLUTION INFILTRATION; PERINEURAL at 09:42

## 2021-06-25 RX ADMIN — ROCURONIUM BROMIDE 50 MG: 10 INJECTION INTRAVENOUS at 09:43

## 2021-06-25 RX ADMIN — SUGAMMADEX 150 MG: 100 INJECTION, SOLUTION INTRAVENOUS at 10:44

## 2021-06-25 RX ADMIN — LEVOFLOXACIN 500 MG: 5 INJECTION, SOLUTION INTRAVENOUS at 09:50

## 2021-06-25 ASSESSMENT — MIFFLIN-ST. JEOR: SCORE: 1196.88

## 2021-06-25 NOTE — ANESTHESIA PROCEDURE NOTES
Airway       Patient location during procedure: OR  Staff -        CRNA: April Umanzor APRN CRNA       Performed By: CRNAIndications and Patient Condition       Indications for airway management: gerardo-procedural       Induction type:intravenous       Mask difficulty assessment: 1 - vent by mask    Final Airway Details       Final airway type: endotracheal airway       Successful airway: ETT - single  Endotracheal Airway Details        ETT size (mm): 7.0       Cuffed: yes       Successful intubation technique: direct laryngoscopy       DL Blade Type: MAC 3       Grade View of Cords: 1       Adjucts: stylet       Position: Right       Measured from: lips       Secured at (cm): 21       Bite Block used: GI bite block.    Post intubation assessment        Placement verified by: capnometry, equal breath sounds and chest rise        Number of attempts at approach: 1       Secured with: pink tape       Ease of procedure: easy       Dentition: Intact

## 2021-06-25 NOTE — ANESTHESIA POSTPROCEDURE EVALUATION
Patient: Berta Nava    Procedure(s):  ENDOSCOPIC RETROGRADE CHOLANGIOPANCREATOGRAPHY with ballon sweep of bile ducts for stones, balloon dilation of bile ducts and bile duct stent placement    Diagnosis:Encounter for replacement of biliary stent [Z46.89]  Diagnosis Additional Information: No value filed.    Anesthesia Type:  General    Note:  Disposition: Outpatient   Postop Pain Control: Uneventful            Sign Out: Well controlled pain   PONV: No   Neuro/Psych: Uneventful            Sign Out: Acceptable/Baseline neuro status   Airway/Respiratory: Uneventful            Sign Out: Acceptable/Baseline resp. status   CV/Hemodynamics: Uneventful            Sign Out: Acceptable CV status; No obvious hypovolemia; No obvious fluid overload   Other NRE: NONE   DID A NON-ROUTINE EVENT OCCUR? No           Last vitals:  Vitals:    06/25/21 1115 06/25/21 1130 06/25/21 1145   BP: 98/68 106/69 95/57   Pulse: 89 91 80   Resp: 16 16 16   Temp:  36.6  C (97.9  F)    SpO2: 99% 99% 99%       Last vitals prior to Anesthesia Care Transfer:  CRNA VITALS  6/25/2021 1024 - 6/25/2021 1124      6/25/2021             NIBP:  99/70    Pulse:  97    Temp:  36.4  C (97.5  F)    SpO2:  100 %    Resp Rate (observed):  20          Electronically Signed By: Nasir Diaz MD  June 25, 2021  12:00 PM

## 2021-06-25 NOTE — ANESTHESIA CARE TRANSFER NOTE
Patient: Berta Nava    Procedure(s):  ENDOSCOPIC RETROGRADE CHOLANGIOPANCREATOGRAPHY with ballon sweep of bile ducts for stones, balloon dilation of bile ducts and bile duct stent placement    Diagnosis: Encounter for replacement of biliary stent [Z46.89]  Diagnosis Additional Information: No value filed.    Anesthesia Type:   General     Note:    Oropharynx: oropharynx clear of all foreign objects and spontaneously breathing  Level of Consciousness: awake  Oxygen Supplementation: nasal cannula  Level of Supplemental Oxygen (L/min / FiO2): 2  Independent Airway: airway patency satisfactory and stable    Vital Signs Stable: post-procedure vital signs reviewed and stable  Report to RN Given: handoff report given  Patient transferred to: PACU    Handoff Report: Identifed the Patient, Identified the Reponsible Provider, Reviewed the pertinent medical history, Discussed the surgical course, Reviewed Intra-OP anesthesia mangement and issues during anesthesia, Set expectations for post-procedure period and Allowed opportunity for questions and acknowledgement of understanding      Vitals: (Last set prior to Anesthesia Care Transfer)  CRNA VITALS  6/25/2021 1024 - 6/25/2021 1106      6/25/2021             NIBP:  99/70    Pulse:  97    Temp:  36.4  C (97.5  F)    SpO2:  100 %    Resp Rate (observed):  20        Electronically Signed By: CHELSEY Petty CRNA  June 25, 2021  11:06 AM  
none

## 2021-06-25 NOTE — LETTER
June 25, 2021    Berta Nava  [address] [facility] [address]    Dear Ms. Nava,    This letter is sent to confirm that you have completed your transplant work-up and you are a candidate in the liver transplant program at the [facility].  You were placed on the liver active waitlist on 6/25/21.      When you are active on the waitlist and an organ becomes available, a coordinator will need to speak to you immediately.  You could be contacted at any time during the day or night as an organ could become available at any time.  Please make certain our office always has your current telephone numbers and address.      Items we will need from you:      We have received approval from your insurance company for the transplant procedure.  It is critical that you notify us if there is any change in your insurance.  It is also important that you familiarize yourself with the details of your specific insurance policy.  Our patient  is available to assist you if you should have any questions regarding your coverage.      You will need to have your labs drawn at least every month to maintain as high a spot as possible on the transplant list. Standing orders have been placed in your chart.       During this waiting period, we may request additional periodic laboratory tests with your primary physician.  It will be your responsibility to remind your physician to forward your results to the Transplant Office.      We need to be kept informed of any changes in your medical condition such as:    o changes in your medications,   o significant changes in your health  o significant infections (such as pneumonia or abscesses)  o blood transfusions  o any condition which requires hospitalization  o any surgery      Remember to complete any routine cancer screening tests required before your transplant.  This includes colonoscopy; prostate screening for  men, and mammogram and gynecologic testing for women, as well as dental work.  Your primary care clinic can assist you with arranging for these exams.  Remind your caregivers to forward copies of the records and final reports.    We want you to know that our program has physician and surgeon coverage 24 hours a day, 365 days a year. If this coverage changes or there are substantial program changes, you will be notified in writing by letter.     Attached is a letter from the [employer] Organ [employer]aring (UNOS). It describes the services and information offered to patients by UNOS and the Organ Procurement and Transplantation Network.    We appreciate having had the opportunity to participate in your care.  If you have questions, please feel free to call the Transplant Office at [phone] or [phone].    Sincerely,    PEBBLES Izaguirre Jr.N, RN  Liver Transplant Coordinator  [phone]    Solid Organ Transplant  [facility], [facility]      nclosures: UNOS Letter  cc: Care Team                                The Organ Procurement and Transplantation Network  Toll-free patient services line:     Your resource for organ transplant information    If you have a question regarding your own medical care, you always should call your transplant hospital first. However, for general organ transplant-related information, you can call the Organ Procurement and Transplantation Network (OPTN) toll-free patient services line at [phone]. Anyone, including potential transplant candidates, candidates, recipients, family members, friends, living donors, and donor family members, can call this number to:          Talk about organ donation, living donation, the transplant process, the donation process, and transplant policies.    Get a free patient information kit with helpful booklets, waiting list and transplant information,  and a list of all transplant hospitals.    Ask questions about the OPTN website (https://optn.transplant.hrsa.gov/), the [employer] Organ Sharing s (UNOS) website (https://unos.org/), or the UNOS website for living donors and transplant recipients. (https://www.transplantliving.org/).    Learn how the OPTN can help you.    Talk about any concerns that you may have with a transplant hospital.    The [facility] transplant system, the OPTN, is managed under federal contract by the [employer] Organ [employer]aring (UNOS), which is a non-profit charitable organization. The OPTN helps create and define organ sharing policies that make the best use of donated organs. This process continuously evaluating new advances and discoveries so policies can be adapted to best serve patients waiting for transplants. To do so, the OPTN works closely with transplant professionals, transplant patients, transplant candidates, donor families, living donors, and the public. All transplant programs and organ procurement organizations throughout the country are OPTN members and are obligated to follow the policies the OPTN creates for allocating organs.    The OPTN also is responsible for:      Providing educational material for patients, the public, and professionals.    Raising awareness of the need for donated organs and tissue.    Coordinating organ procurement, matching, and placement.    Collecting information about every organ transplant and donation that occurs in the [place].    Remember, you should contact your transplant hospital directly if you have questions or concerns about your own medical care including medical records, work-up progress, and test results.    We are not your transplant hospital, and our staff will not be able to answer questions about your case, so please keep your transplant hospital s phone number handy.    However, while you research your transplant needs and learn as much as you can about  transplantation and donation, we welcome your call to our toll-free patient services line at [phone].          Updated 4/1/2019

## 2021-06-25 NOTE — DISCHARGE INSTRUCTIONS
[facility], [place]  Same-Day Surgery ERCP Procedure   Adult Discharge Orders & Instructions     You had a procedure known as an Endoscopic Retrograde Cholangiopancreatography (ERCP). Your healthcare provider performed the ERCP to look at your bile or pancreatic ducts, and to locate and/or treat blockages (dilation, stenting, removal, etc.) in these ducts. Often biopsies, small samples of tissue, are taken to help diagnose and/or classify stages of disease growth. This procedure is used to diagnose diseases of the pancreas, bile ducts, pancreatic duct, liver, and gallbladder.     After your procedure   1. Make sure to clarify with your healthcare provider any diet restrictions (For example, clear liquid, low fat, no caffeine, etc.)   2. Do NOT take aspirin containing medications or any other blood-thinning medicines (anticoagulants) until your healthcare provider says it's OK.   3. You MAY be prescribed antibiotics, depending on what was done and/or found during your EUS, make sure to take antibiotics as prescribed by your healthcare provider    For 24 hours after surgery  1. Get plenty of rest.  A responsible adult must stay with you for at least 24 hours after you leave the hospital.   2. Do not drive or use heavy equipment.  If you have weakness or tingling, don't drive or use heavy equipment until this feeling goes away.  3. Do not drink alcohol.  4. Avoid strenuous or risky activities (gym, yoga, cycling, etc.).  Ask for help when climbing stairs.   5. You may feel lightheaded.  IF so, sit for a few minutes before standing.  Have someone help you get up.   6. If you have nausea (feel sick to your stomach): Drink only clear liquids such as apple juice, ginger ale, broth or 7-Up.  Rest may also help.  Be sure to drink enough fluids.  Move to a regular diet as you feel able.  7. If you feel bloated or have too much gas, use a heating pad on your belly to help reduce the discomfort.  This should help you feel better.   8. You may have a slight fever. This is normal for the first 24 hours.   9. You may have a dry mouth, a sore throat, muscle aches or trouble sleeping.  These are normal and will go away after 24 hours. A sore throat is most common. Use lozenges or gargle with salt water to ease the discomfort.   10. Do not make important or legal decisions.      Call your doctor for any of the followin. Chest pain, and/or shortness of breath  2. Abdominal  pain, bloating or cramping that has not improved or does not respond to pain reliving medications (Tylenol or narcotics if prescribed)   3. Difficulty swallowing or feeling as though food or liquids are stuck in your throat   4. Sore throat lasting more than 2 days or pain that has worsened over time   5. Black or tarry stools   6. Nausea and/or vomiting that is not resolving or has not responded to anti-nausea medications prescribed to you   7. It has been over 8 to 10 hours since surgery and you are still not able to urinate (pass water)   8. Headache for over 24 hours   9. Fever over 100.5 F (38 C) lasting more than 24 hours after the procedure   10. Signs of jaundice or blockage (fever, chills, abdominal pain, yellowing of the whites of your eyes, yellowing of your skin, and/or passing darker than normal urine)     To contact a doctor, call:   [ ] Dr. Gabriel [phone] (Monday thru Friday 8:00am to 4:30pm)   [ ] [phone] and ask for the Gastrointestinal resident on call (answered 24 hours a day)   [ ] Emergency Department: [facility]: [phone]     Take it easy when you get home.  Remember, same day surgery DOES NOT MEAN SAME DAY RECOVERY!  Healing is a gradual process.  You will need some time to recover - you may be more tired than you realize at first.  Rest and relax for at least the first 24 hours at home.  You'll feel better and heal faster if you take good care of yourself.

## 2021-06-25 NOTE — ANESTHESIA PREPROCEDURE EVALUATION
Anesthesia Pre-Procedure Evaluation    Patient: Berta Nava   MRN: [id number] : 1965        Preoperative Diagnosis: Cholangitis [K83.09]   Procedure : Procedure(s):  ENDOSCOPIC RETROGRADE CHOLANGIOPANCREATOGRAPHY     Past Medical History:   Diagnosis Date     Ascites      Biliary cirrhosis (H)      Cholangitis, sclerosing      Cirrhosis of liver with ascites (H) 3/3/2021     Hearing loss of left ear     wears a hearing aide     History of low potassium      Hyperlipidemia      Hypertension      SBP (spontaneous bacterial peritonitis) (H) 2021     Sjogren's syndrome (H)      Ulcerative colitis (H)      Ulcerative pancolitis (H)       Past Surgical History:   Procedure Laterality Date     ENDOSCOPIC RETROGRADE CHOLANGIOPANCREATOGRAPHY, EXCHANGE TUBE/STENT N/A 2021    Procedure: ENDOSCOPIC RETROGRADE CHOLANGIOPANCREATOGRAPHY WITH STENT EXCHANGE, STONE EXTRACTION, AND DILATION;  Surgeon: Gregory Gabriel MD;  Location: [facility] OR     ENDOSCOPIC RETROGRADE CHOLANGIOPANCREATOGRAPHY, EXCHANGE TUBE/STENT N/A 5/10/2021    Procedure: ENDOSCOPIC RETROGRADE CHOLANGIOPANCREATOGRAPHY with biliary dilation, stone removal, stent exchange;  Surgeon: Gregory Gabriel MD;  Location: [facility] OR     ENDOSCOPIC RETROGRADE CHOLANGIOPANCREATOGRAPHY, EXCHANGE TUBE/STENT N/A 6/10/2021    Procedure: ENDOSCOPIC RETROGRADE CHOLANGIOPANCREATOGRAPHY, WITH biliary stent exchange, stone removal;  Surgeon: Woodrow Lott MD;  Location: [facility] OR     GYN SURGERY      bilat fallopian tubes and ovaries removed     PICC DOUBLE LUMEN PLACEMENT Right 2021    42cm (2cm external), Lateral brachial vein      Allergies   Allergen Reactions     Diagnostic X-Ray Materials Hives     PATIENT HAD HIVE REACTION AFTER ADMINISTERING CT CONTRAST DYE.       Contrast Dye       Social History     Tobacco Use     Smoking status: Never Smoker     Smokeless tobacco: Never Used   Substance Use Topics     Alcohol use: No     Comment: Last drink was        Wt Readings from Last 1 Encounters:   06/25/21 61.1 kg (134 lb 11.2 oz)        Anesthesia Evaluation   Pt has had prior anesthetic. Type: General.    No history of anesthetic complications       ROS/MED HX  ENT/Pulmonary:       Neurologic:       Cardiovascular: Comment: EF 60%, no valvular abnl    (+) Dyslipidemia hypertension-----    METS/Exercise Tolerance:     Hematologic:       Musculoskeletal:       GI/Hepatic: Comment: Primary sclerosing cholangitis, acute cholangitis, cirrhosis with ascites, ulcerative colitis    (+) liver disease,     Renal/Genitourinary:       Endo:       Psychiatric/Substance Use:       Infectious Disease:       Malignancy:       Other: Comment: sjogren's           Physical Exam    Airway  airway exam normal      Mallampati: I   TM distance: > 3 FB   Neck ROM: full   Mouth opening: > 3 cm    Respiratory Devices and Support         Dental  no notable dental history         Cardiovascular   cardiovascular exam normal          Pulmonary   pulmonary exam normal                OUTSIDE LABS:  CBC:   Lab Results   Component Value Date    WBC 14.0 (H) 06/21/2021    WBC 21.3 (H) 06/12/2021    HGB 10.7 (L) 06/21/2021    HGB 10.3 (L) 06/12/2021    HCT 32.1 (L) 06/21/2021    HCT 32.3 (L) 06/12/2021     06/21/2021     06/12/2021     BMP:   Lab Results   Component Value Date     06/21/2021     06/12/2021    POTASSIUM 4.4 06/21/2021    POTASSIUM 3.9 06/12/2021    CHLORIDE 103 06/21/2021    CHLORIDE 108 06/12/2021    CO2 19 (L) 06/21/2021    CO2 17 (L) 06/12/2021    BUN 18 06/21/2021    BUN 22 06/12/2021    CR 0.68 06/21/2021    CR 0.56 06/12/2021     (H) 06/21/2021    GLC 98 06/12/2021     COAGS:   Lab Results   Component Value Date    PTT 32 06/09/2021    INR 1.44 (H) 06/21/2021     POC:   Lab Results   Component Value Date    BGM 95 06/25/2021     HEPATIC:   Lab Results   Component Value Date    ALBUMIN 2.6 (L) 06/21/2021    PROTTOTAL 7.0 06/21/2021    ALT 26  06/21/2021    AST 48 (H) 06/21/2021    ALKPHOS 426 (H) 06/21/2021    BILITOTAL 2.2 (H) 06/21/2021    MIHIR 48 06/09/2021     OTHER:   Lab Results   Component Value Date    LACT 1.1 04/30/2021    A1C 4.3 04/29/2021    MARIELENA 9.0 06/21/2021    PHOS 2.6 05/16/2018    MAG 2.7 (H) 05/14/2018    LIPASE 203 06/09/2021    CRP 23.1 (H) 06/08/2021       Anesthesia Plan    ASA Status:  3   NPO Status:  NPO Appropriate    Anesthesia Type: General.     - Airway: ETT   Induction: Intravenous.   Maintenance: Inhalation.        Consents    Anesthesia Plan(s) and associated risks, benefits, and realistic alternatives discussed. Questions answered and patient/representative(s) expressed understanding.     - Discussed with:  Patient      - Extended Intubation/Ventilatory Support Discussed: No.      - Patient is DNR/DNI Status: No    Use of blood products discussed: No .     Postoperative Care       PONV prophylaxis: Ondansetron (or other 5HT-3), Dexamethasone or Solumedrol     Comments:                    Nasir Diaz MD

## 2021-06-25 NOTE — OP NOTE
ERCP 06/25/2021  9:18 AM [facility], [address] [place] [address] [phone]     Endoscopy Department   _______________________________________________________________________________   Patient Name: Berta Nava           Procedure Date: 6/25/2021 9:18 AM   MRN: [id number]                       Account Number: [id number]   YOB: 1965              Admit Type: Outpatient   Age: 55                               Room: OR   Gender: Female                        Note Status: Supervisor Override   Attending MD: Gregory Gabriel MD       Pause for the Cause: time out performed   Total Sedation Time:                     _______________________________________________________________________________       Procedure:           ERCP   Indications:         Primary sclerosing cholangitis, 55-year-old with a                        history of PSC causing cirrhosis complicated by                        quiescent UC on mesalamine, large EV (non banded),                        ascites, biliary strictures and choledocholithiasis.                        Admitted ~2 weeks ago with recurrent cholangitis s/p                        ERCP with Dr. Lott. Here for follow up ERCP for                        further stone clearance and stent exchange.   Providers:           Gregory Gabriel MD, Jennifer Vanderheyden   Referring MD:           Requesting Provider: Thomas M. Leventhal, MD   Medicines:           General Anesthesia, Indomethacin 100 mg MO, Levaquin 500                        mg IV   Complications:       No immediate complications. Estimated blood loss:                        Minimal.   _______________________________________________________________________________   Procedure:           Pre-Anesthesia Assessment:                        - Prior to the procedure, a History and Physical was                        performed, and patient medications and allergies were                         reviewed. The patient is competent. The risks and                        benefits of the procedure and the sedation options and                        risks were discussed with the patient. All questions                        were answered and informed consent was obtained. Patient                        identification and proposed procedure were verified by                        the physician, the nurse, the anesthesiologist and the                        anesthetist in the procedure room. Mental Status                        Examination: alert and oriented. Airway Examination:                        normal oropharyngeal airway and neck mobility.                        Respiratory Examination: clear to auscultation. CV                        Examination: normal. Prophylactic Antibiotics: The                        patient requires prophylactic antibiotics for the                        planned ERCP in an obstructed bile duct. The patient                        received antibiotic therapy before the procedure. Prior                        Anticoagulants: The patient has taken no previous                        anticoagulant or antiplatelet agents. ASA Grade                        Assessment: III - A patient with severe systemic                        disease. After reviewing the risks and benefits, the                        patient was deemed in satisfactory condition to undergo                        the procedure. The anesthesia plan was to use general                        anesthesia. Immediately prior to administration of                        medications, the patient was re-assessed for adequacy to                        receive sedatives. The heart rate, respiratory rate,                        oxygen saturations, blood pressure, adequacy of                        pulmonary ventilation, and response to care were                        monitored throughout the procedure. The physical status                         of the patient was re-assessed after the procedure.                        After obtaining informed consent, the scope was passed                        under direct vision. Throughout the procedure, the                        patient's blood pressure, pulse, and oxygen saturations                        were monitored continuously. The was introduced through                        the mouth, and used to inject contrast into and used to                        inject contrast into the bile duct. The ERCP was                        accomplished without difficulty. The patient tolerated                        the procedure well.                                                                                     Findings:        Two distally migrated biliary stents were visible on the  film. The        esophagus was successfully intubated under direct vision. The scope was        advanced to a normal major papilla in the descending duodenum without        detailed examination of the pharynx, larynx and associated structures,        and upper GI tract. The upper GI tract was grossly normal. Two plastic        stents originating in the biliary tree were emerging from the major        papilla. The stents were visibly patent. A biliary sphincterotomy had        been performed. The sphincterotomy appeared open. Two stents were        removed from the biliary tree using a rat-toothed forceps. The bile duct        was deeply cannulated with the 12 mm balloon. Contrast was injected. I        personally interpreted the bile duct images. There was brisk flow of        contrast through the ducts. Image quality was excellent. Contrast        extended to the entire biliary tree. The common bile duct, hepatic duct        bifurcation and right intrahepatic branches contained filling defect(s)        thought to be a stone and sludge. The entire biliary tree contained        diffuse rarefaction of ducts. The right main  hepatic duct contained a        focal stenoses with upstream sacular dilation with filling defects.        Visiglide wire was passed into the biliary tree. The biliary tree was        swept with a 12 mm balloon starting at the left intrahepatic duct(s) and        right intrahepatic duct(s). Sludge was swept from the duct. Many stones        were removed. A few stones remained in the right intrahepatic duct that        could not be easily swept out due to stenosis. Dilation of the right        main hepatic duct with a 4 mm balloon dilator was successful. One 10 Fr        by 20 cm transpapillary plastic stent was placed 18 cm into the left        hepatic duct. Bile flowed through the stent. The stent was in good        position. One 10 Fr by 15 cm intraductal plastic stent was placed 15 cm        into the right hepatic duct. Bile flowed through the stent. The stent        was in good position.                                                                                     Impression:          - Distally migrated previously placed biliary stents in                        place. Removed                        - Cholangiogram showed filling defects throughout the                        biliary tree consistent with stone and sludge. Focal                        stricture off the right main with upstream saccular                        dilation with stones.                        - Biliary tree swept of small pigmented stones and sludge                        - Branch off right main dilated to 4 mm                        - One 10 Fr x 20 cm Johlin stent placed into left                        intrahepatic duct                        - One 10 Fr x 15 cm Johlin stent placed into right                        intrahepatic duct intraductally to reduce the risk of                        migration                        - Esophageal varices not intervened upon today due to                        concern that early repeat ERCP  intervention could be                        needed for recurrent cholangitis and since she has not                        yet had a bleeding episode yet   Recommendation:      - Discharge patient to home (ambulatory).                        - 5 day course of levofloxacin 500 mg PO daily due to                        leukocytosis noted today likely due to low grade                        cholangitis.                        - Repeat ERCP in 1 month with Dr. Gabriel for further                        stone clearance as a bridge to liver transplant. Earlier                        if signs of recurrent cholangitis or rising LFTs.                        - Above discussed with patient                                                                                       Gregory Gabriel MD

## 2021-06-28 ENCOUNTER — PREP FOR PROCEDURE (OUTPATIENT)
Dept: GASTROENTEROLOGY | Facility: CLINIC | Age: 56
End: 2021-06-28

## 2021-06-28 DIAGNOSIS — K83.01 PRIMARY SCLEROSING CHOLANGITIS (H): Primary | ICD-10-CM

## 2021-06-29 ENCOUNTER — PATIENT OUTREACH (OUTPATIENT)
Dept: GASTROENTEROLOGY | Facility: CLINIC | Age: 56
End: 2021-06-29

## 2021-06-29 NOTE — PROGRESS NOTES
Called to discuss with patient.    Procedure/Imaging/Clinic: ERCP   Physician: Harvey   Timing: ~1 month   Procedure length: 60 min   Anesthesia: Gen   Dx: primary sclerosing cholangitis   Tier: 3   Location: [facility] or [facility] OR  Pt in agreement with [facility] location, confirmed date of 7/22    Explained they can expect a call from  for date and time of procedure, will need a , someone to stay with them for 24 hours and should stay in town for 24 hours (within 45 min of Hospital) post procedure    Patient needs to get pre-op physical completed. If outside [facility] system will need physical faxed to number [phone]   If you do not get a preop physical, your procedure could be cancelled, patient voiced understanding*    Preop Plan: recent office visit on 6/21, Dr Gabriel to update    Med Review    Blood thinner -  none  ASA - none  Diabetic - none    COVID test discussed: yes, pt aware needs to be within 4 days of procedure    Patient Education r/t procedure: mychart    Verbalized understanding of all instructions. All questions answered.      Case request in, message to  to set date  Will cancel if patient gets transplant before then    Jeanette Brennan, RN, BSN,   [facility]  Care coordinator   [phone]  Fax [phone]

## 2021-06-29 NOTE — PROGRESS NOTES
This is a recent snapshot of the patient's [facility] medical record.  For current drug dose and complete information and questions, call [phone] or In Basket pool, [facility] home infusion ([id number])  CSN Number:  [id number]

## 2021-07-01 ENCOUNTER — HOSPITAL ENCOUNTER (OUTPATIENT)
Dept: ULTRASOUND IMAGING | Facility: CLINIC | Age: 56
Discharge: HOME | End: 2021-07-01
Attending: INTERNAL MEDICINE | Admitting: INTERNAL MEDICINE
Payer: COMMERCIAL

## 2021-07-01 DIAGNOSIS — Z11.59 ENCOUNTER FOR SCREENING FOR OTHER VIRAL DISEASES: ICD-10-CM

## 2021-07-01 DIAGNOSIS — K74.60 CIRRHOSIS OF LIVER WITH ASCITES, UNSPECIFIED HEPATIC CIRRHOSIS TYPE (H): ICD-10-CM

## 2021-07-01 DIAGNOSIS — R18.8 CIRRHOSIS OF LIVER WITH ASCITES, UNSPECIFIED HEPATIC CIRRHOSIS TYPE (H): ICD-10-CM

## 2021-07-01 PROBLEM — K83.01 PRIMARY SCLEROSING CHOLANGITIS (H): Status: ACTIVE | Noted: 2021-07-01

## 2021-07-01 PROCEDURE — 250N000011 HC RX IP 250 OP 636

## 2021-07-01 PROCEDURE — P9047 ALBUMIN (HUMAN), 25%, 50ML: HCPCS

## 2021-07-01 PROCEDURE — 250N000009 HC RX 250: Performed by: RADIOLOGY

## 2021-07-01 PROCEDURE — 49083 ABD PARACENTESIS W/IMAGING: CPT

## 2021-07-01 RX ORDER — LIDOCAINE HYDROCHLORIDE 10 MG/ML
10 INJECTION, SOLUTION EPIDURAL; INFILTRATION; INTRACAUDAL; PERINEURAL ONCE
Status: COMPLETED | OUTPATIENT
Start: 2021-07-01 | End: 2021-07-01

## 2021-07-01 RX ORDER — ALBUMIN (HUMAN) 12.5 G/50ML
SOLUTION INTRAVENOUS
Status: COMPLETED
Start: 2021-07-01 | End: 2021-07-01

## 2021-07-01 RX ADMIN — LIDOCAINE HYDROCHLORIDE 10 ML: 10 INJECTION, SOLUTION EPIDURAL; INFILTRATION; INTRACAUDAL; PERINEURAL at 15:24

## 2021-07-01 RX ADMIN — ALBUMIN HUMAN 12.5 G: 0.25 SOLUTION INTRAVENOUS at 15:51

## 2021-07-01 NOTE — PROGRESS NOTES
Patient tolerated paracentesis well.  4500 mL of straw colored fluid drained done by Dr. Richards.  Dressing dry and intact with no drainage.  12.5mg  albumin given. Patient states understanding discharge instructions and left ambulatory in stable condition by herself.

## 2021-07-01 NOTE — PROCEDURES
[facility]    Procedure: Imaging Procedure Note    Date/Time: 7/1/2021 3:33 PM  Performed by: Rick Richards MD  Authorized by: Rick Richards MD     UNIVERSAL PROTOCOL   Site Marked: Yes  Prior Images Obtained and Reviewed:  Yes  Required items: Required blood products, implants, devices and special equipment available    Patient identity confirmed:  Verbally with patient, arm band and provided demographic data  NA - No sedation, light sedation, or local anesthesia  Confirmation Checklist:  Patient's identity using two indicators, relevant allergies, procedure was appropriate and matched the consent or emergent situation and correct equipment/implants were available  Time out: Immediately prior to the procedure a time out was called    Universal Protocol: the Joint Commission Universal Protocol was followed    Preparation: Patient was prepped and draped in usual sterile fashion    ESBL (mL):  5         ANESTHESIA    Anesthesia: Local infiltration  Local Anesthetic:  Lidocaine 1% without epinephrine  Anesthetic Total (mL):  10      SEDATION    Patient Sedated: No    See dictated procedure note for full details.  PROCEDURE   Patient Tolerance:  Patient tolerated the procedure well with no immediate complications  Describe Procedure:     Successful left lower quadrant paracentesis      Length of time physician/provider present for 1:1 monitoring during sedation: 0

## 2021-07-05 ENCOUNTER — HOSPITAL ENCOUNTER (OUTPATIENT)
Dept: LAB | Facility: CLINIC | Age: 56
Discharge: HOME | End: 2021-07-05
Admitting: INTERNAL MEDICINE
Payer: COMMERCIAL

## 2021-07-05 DIAGNOSIS — Z11.59 ENCOUNTER FOR SCREENING FOR OTHER VIRAL DISEASES: ICD-10-CM

## 2021-07-05 LAB
SARS-COV-2 RNA RESP QL NAA+PROBE: NORMAL
SPECIMEN SOURCE: NORMAL

## 2021-07-05 PROCEDURE — U0005 INFEC AGEN DETEC AMPLI PROBE: HCPCS | Performed by: INTERNAL MEDICINE

## 2021-07-05 PROCEDURE — U0003 INFECTIOUS AGENT DETECTION BY NUCLEIC ACID (DNA OR RNA); SEVERE ACUTE RESPIRATORY SYNDROME CORONAVIRUS 2 (SARS-COV-2) (CORONAVIRUS DISEASE [COVID-19]), AMPLIFIED PROBE TECHNIQUE, MAKING USE OF HIGH THROUGHPUT TECHNOLOGIES AS DESCRIBED BY CMS-2020-01-R: HCPCS | Performed by: INTERNAL MEDICINE

## 2021-07-07 ENCOUNTER — PATIENT OUTREACH (OUTPATIENT)
Dept: GASTROENTEROLOGY | Facility: CLINIC | Age: 56
End: 2021-07-07

## 2021-07-07 NOTE — PROGRESS NOTES
Pt called with question about pre op exam in preparation for procedure. Recent office visit with full physical assessment on 6/21 will be updated for 7/22 procedure.  Pt has COVID test scheduled  No further questions    Jeanette Brennan RN, BSN,   [facility]  Care coordinator   [phone]  Fax [phone]

## 2021-07-08 ENCOUNTER — HOSPITAL ENCOUNTER (OUTPATIENT)
Dept: ULTRASOUND IMAGING | Facility: CLINIC | Age: 56
Discharge: HOME | End: 2021-07-08
Attending: INTERNAL MEDICINE | Admitting: INTERNAL MEDICINE
Payer: COMMERCIAL

## 2021-07-08 DIAGNOSIS — R18.8 CIRRHOSIS OF LIVER WITH ASCITES, UNSPECIFIED HEPATIC CIRRHOSIS TYPE (H): ICD-10-CM

## 2021-07-08 DIAGNOSIS — K74.60 CIRRHOSIS OF LIVER WITH ASCITES, UNSPECIFIED HEPATIC CIRRHOSIS TYPE (H): ICD-10-CM

## 2021-07-08 PROCEDURE — P9047 ALBUMIN (HUMAN), 25%, 50ML: HCPCS

## 2021-07-08 PROCEDURE — 250N000009 HC RX 250: Performed by: RADIOLOGY

## 2021-07-08 PROCEDURE — 250N000011 HC RX IP 250 OP 636

## 2021-07-08 PROCEDURE — 49083 ABD PARACENTESIS W/IMAGING: CPT

## 2021-07-08 RX ORDER — LIDOCAINE HYDROCHLORIDE 10 MG/ML
10 INJECTION, SOLUTION EPIDURAL; INFILTRATION; INTRACAUDAL; PERINEURAL ONCE
Status: COMPLETED | OUTPATIENT
Start: 2021-07-08 | End: 2021-07-08

## 2021-07-08 RX ORDER — ALBUMIN (HUMAN) 12.5 G/50ML
12.5 SOLUTION INTRAVENOUS ONCE
Status: COMPLETED | OUTPATIENT
Start: 2021-07-08 | End: 2021-07-08

## 2021-07-08 RX ORDER — ALBUMIN (HUMAN) 12.5 G/50ML
SOLUTION INTRAVENOUS
Status: COMPLETED
Start: 2021-07-08 | End: 2021-07-08

## 2021-07-08 RX ADMIN — ALBUMIN HUMAN 12.5 G: 0.25 SOLUTION INTRAVENOUS at 15:45

## 2021-07-08 RX ADMIN — ALBUMIN (HUMAN) 12.5 G: 12.5 SOLUTION INTRAVENOUS at 15:45

## 2021-07-08 RX ADMIN — LIDOCAINE HYDROCHLORIDE 10 ML: 10 INJECTION, SOLUTION EPIDURAL; INFILTRATION; INTRACAUDAL; PERINEURAL at 15:18

## 2021-07-08 NOTE — PROGRESS NOTES
Patient tolerated paracentesis well.  4000 mL of straw colored fluid drained done by Dr. Barrientos  Dressing dry and intact with no drainage.  12.5gms albumin given. Patient states understanding discharge instructions and left ambulatory by herself in stable condition.

## 2021-07-12 DIAGNOSIS — Z11.59 ENCOUNTER FOR SCREENING FOR OTHER VIRAL DISEASES: ICD-10-CM

## 2021-07-15 ENCOUNTER — HOSPITAL ENCOUNTER (OUTPATIENT)
Dept: ULTRASOUND IMAGING | Facility: CLINIC | Age: 56
Discharge: HOME | End: 2021-07-15
Attending: INTERNAL MEDICINE | Admitting: INTERNAL MEDICINE
Payer: COMMERCIAL

## 2021-07-15 VITALS — SYSTOLIC BLOOD PRESSURE: 100 MMHG | HEART RATE: 91 BPM | DIASTOLIC BLOOD PRESSURE: 68 MMHG | RESPIRATION RATE: 20 BRPM

## 2021-07-15 DIAGNOSIS — R18.8 CIRRHOSIS OF LIVER WITH ASCITES, UNSPECIFIED HEPATIC CIRRHOSIS TYPE (H): ICD-10-CM

## 2021-07-15 DIAGNOSIS — K74.60 CIRRHOSIS OF LIVER WITH ASCITES, UNSPECIFIED HEPATIC CIRRHOSIS TYPE (H): ICD-10-CM

## 2021-07-15 PROCEDURE — 250N000011 HC RX IP 250 OP 636

## 2021-07-15 PROCEDURE — 49083 ABD PARACENTESIS W/IMAGING: CPT

## 2021-07-15 PROCEDURE — P9047 ALBUMIN (HUMAN), 25%, 50ML: HCPCS

## 2021-07-15 PROCEDURE — 250N000009 HC RX 250: Performed by: RADIOLOGY

## 2021-07-15 RX ORDER — ALBUMIN (HUMAN) 12.5 G/50ML
SOLUTION INTRAVENOUS
Status: COMPLETED
Start: 2021-07-15 | End: 2021-07-15

## 2021-07-15 RX ORDER — ALBUMIN (HUMAN) 12.5 G/50ML
12.5 SOLUTION INTRAVENOUS ONCE
Status: COMPLETED | OUTPATIENT
Start: 2021-07-15 | End: 2021-07-15

## 2021-07-15 RX ORDER — LIDOCAINE HYDROCHLORIDE 10 MG/ML
10 INJECTION, SOLUTION EPIDURAL; INFILTRATION; INTRACAUDAL; PERINEURAL ONCE
Status: COMPLETED | OUTPATIENT
Start: 2021-07-15 | End: 2021-07-15

## 2021-07-15 RX ADMIN — LIDOCAINE HYDROCHLORIDE 10 ML: 10 INJECTION, SOLUTION EPIDURAL; INFILTRATION; INTRACAUDAL; PERINEURAL at 15:30

## 2021-07-15 RX ADMIN — ALBUMIN (HUMAN) 12.5 G: 12.5 SOLUTION INTRAVENOUS at 15:35

## 2021-07-15 RX ADMIN — ALBUMIN HUMAN 12.5 G: 0.25 SOLUTION INTRAVENOUS at 15:35

## 2021-07-15 NOTE — PROGRESS NOTES
Pt was in Radiology today for a paracentesis. Procedure performed by Dr Bhagat  Procedure was tolerated well, vitals remained stable.4700 cc's of slightly cloudy fabiola colored fluid removed. 12.5 G Albumin instilled per protocol. Written and verbal instructions were reviewed here is no evidence of bleeding upon discharge.  Pt left department in stable satisfactory condition.

## 2021-07-19 ENCOUNTER — LAB (OUTPATIENT)
Dept: LAB | Facility: CLINIC | Age: 56
End: 2021-07-19
Attending: INTERNAL MEDICINE
Payer: COMMERCIAL

## 2021-07-19 DIAGNOSIS — Z11.59 ENCOUNTER FOR SCREENING FOR OTHER VIRAL DISEASES: ICD-10-CM

## 2021-07-19 PROCEDURE — U0003 INFECTIOUS AGENT DETECTION BY NUCLEIC ACID (DNA OR RNA); SEVERE ACUTE RESPIRATORY SYNDROME CORONAVIRUS 2 (SARS-COV-2) (CORONAVIRUS DISEASE [COVID-19]), AMPLIFIED PROBE TECHNIQUE, MAKING USE OF HIGH THROUGHPUT TECHNOLOGIES AS DESCRIBED BY CMS-2020-01-R: HCPCS

## 2021-07-20 LAB — SARS-COV-2 RNA RESP QL NAA+PROBE: NEGATIVE

## 2021-07-21 ENCOUNTER — ANESTHESIA EVENT (OUTPATIENT)
Dept: SURGERY | Facility: CLINIC | Age: 56
End: 2021-07-21
Payer: COMMERCIAL

## 2021-07-21 ENCOUNTER — HOSPITAL ENCOUNTER (OUTPATIENT)
Dept: ULTRASOUND IMAGING | Facility: CLINIC | Age: 56
Discharge: HOME | End: 2021-07-21
Attending: INTERNAL MEDICINE | Admitting: INTERNAL MEDICINE
Payer: COMMERCIAL

## 2021-07-21 DIAGNOSIS — K74.60 CIRRHOSIS OF LIVER WITH ASCITES, UNSPECIFIED HEPATIC CIRRHOSIS TYPE (H): ICD-10-CM

## 2021-07-21 DIAGNOSIS — R18.8 CIRRHOSIS OF LIVER WITH ASCITES, UNSPECIFIED HEPATIC CIRRHOSIS TYPE (H): ICD-10-CM

## 2021-07-21 PROCEDURE — 49083 ABD PARACENTESIS W/IMAGING: CPT

## 2021-07-21 PROCEDURE — 250N000009 HC RX 250: Performed by: RADIOLOGY

## 2021-07-21 PROCEDURE — 250N000011 HC RX IP 250 OP 636

## 2021-07-21 PROCEDURE — P9047 ALBUMIN (HUMAN), 25%, 50ML: HCPCS

## 2021-07-21 RX ORDER — LIDOCAINE HYDROCHLORIDE 10 MG/ML
10 INJECTION, SOLUTION EPIDURAL; INFILTRATION; INTRACAUDAL; PERINEURAL ONCE
Status: COMPLETED | OUTPATIENT
Start: 2021-07-21 | End: 2021-07-21

## 2021-07-21 RX ORDER — ALBUMIN (HUMAN) 12.5 G/50ML
SOLUTION INTRAVENOUS
Status: COMPLETED
Start: 2021-07-21 | End: 2021-07-21

## 2021-07-21 RX ORDER — ALBUMIN (HUMAN) 12.5 G/50ML
12.5 SOLUTION INTRAVENOUS ONCE
Status: COMPLETED | OUTPATIENT
Start: 2021-07-21 | End: 2021-07-21

## 2021-07-21 RX ADMIN — ALBUMIN (HUMAN) 12.5 G: 12.5 SOLUTION INTRAVENOUS at 16:07

## 2021-07-21 RX ADMIN — LIDOCAINE HYDROCHLORIDE 10 ML: 10 INJECTION, SOLUTION EPIDURAL; INFILTRATION; INTRACAUDAL; PERINEURAL at 15:43

## 2021-07-21 RX ADMIN — ALBUMIN HUMAN 12.5 G: 0.25 SOLUTION INTRAVENOUS at 16:07

## 2021-07-21 NOTE — PROGRESS NOTES
Patient tolerated paracentesis well.  4300 mL of straw colored  fluid drained done by Dr. Barrientos.  Dressing dry and intact with no drainage.  12.5 of  albumin given. Patient states understanding discharge instructions and left ambulatory by herself in stable condition.

## 2021-07-22 ENCOUNTER — HOSPITAL ENCOUNTER (OUTPATIENT)
Facility: CLINIC | Age: 56
Discharge: HOME | End: 2021-07-22
Attending: INTERNAL MEDICINE | Admitting: INTERNAL MEDICINE
Payer: COMMERCIAL

## 2021-07-22 ENCOUNTER — ANESTHESIA (OUTPATIENT)
Dept: SURGERY | Facility: CLINIC | Age: 56
End: 2021-07-22
Payer: COMMERCIAL

## 2021-07-22 ENCOUNTER — PREP FOR PROCEDURE (OUTPATIENT)
Dept: GASTROENTEROLOGY | Facility: CLINIC | Age: 56
End: 2021-07-22

## 2021-07-22 ENCOUNTER — APPOINTMENT (OUTPATIENT)
Dept: GENERAL RADIOLOGY | Facility: CLINIC | Age: 56
End: 2021-07-22
Attending: INTERNAL MEDICINE
Payer: COMMERCIAL

## 2021-07-22 VITALS
RESPIRATION RATE: 18 BRPM | HEIGHT: 65 IN | HEART RATE: 77 BPM | OXYGEN SATURATION: 100 % | WEIGHT: 131 LBS | TEMPERATURE: 97.6 F | BODY MASS INDEX: 21.83 KG/M2 | DIASTOLIC BLOOD PRESSURE: 65 MMHG | SYSTOLIC BLOOD PRESSURE: 103 MMHG

## 2021-07-22 DIAGNOSIS — K83.01 PRIMARY SCLEROSING CHOLANGITIS (H): ICD-10-CM

## 2021-07-22 DIAGNOSIS — K83.01 PRIMARY SCLEROSING CHOLANGITIS (H): Primary | ICD-10-CM

## 2021-07-22 LAB
ALBUMIN SERPL-MCNC: 2.4 G/DL (ref 3.4–5)
ALP SERPL-CCNC: 366 U/L (ref 40–150)
ALT SERPL W P-5'-P-CCNC: 39 U/L (ref 0–50)
ANION GAP SERPL CALCULATED.3IONS-SCNC: 11 MMOL/L (ref 3–14)
AST SERPL W P-5'-P-CCNC: 67 U/L (ref 0–45)
BILIRUB SERPL-MCNC: 1.9 MG/DL (ref 0.2–1.3)
BUN SERPL-MCNC: 24 MG/DL (ref 7–30)
CALCIUM SERPL-MCNC: 9 MG/DL (ref 8.5–10.1)
CHLORIDE BLD-SCNC: 99 MMOL/L (ref 94–109)
CO2 SERPL-SCNC: 17 MMOL/L (ref 20–32)
CREAT SERPL-MCNC: 0.66 MG/DL (ref 0.52–1.04)
ERCP: NORMAL
ERYTHROCYTE [DISTWIDTH] IN BLOOD BY AUTOMATED COUNT: 15.4 % (ref 10–15)
GFR SERPL CREATININE-BSD FRML MDRD: >90 ML/MIN/1.73M2
GLUCOSE BLD-MCNC: 96 MG/DL (ref 70–99)
HCT VFR BLD AUTO: 30.7 % (ref 35–47)
HGB BLD-MCNC: 10.2 G/DL (ref 11.7–15.7)
INR PPP: 1.29 (ref 0.85–1.15)
MCH RBC QN AUTO: 30.2 PG (ref 26.5–33)
MCHC RBC AUTO-ENTMCNC: 33.2 G/DL (ref 31.5–36.5)
MCV RBC AUTO: 91 FL (ref 78–100)
PLATELET # BLD AUTO: 400 10E3/UL (ref 150–450)
POTASSIUM BLD-SCNC: 3.7 MMOL/L (ref 3.4–5.3)
PROT SERPL-MCNC: 7.2 G/DL (ref 6.8–8.8)
RBC # BLD AUTO: 3.38 10E6/UL (ref 3.8–5.2)
SODIUM SERPL-SCNC: 127 MMOL/L (ref 133–144)
WBC # BLD AUTO: 9.6 10E3/UL (ref 4–11)

## 2021-07-22 PROCEDURE — C1877 STENT, NON-COAT/COV W/O DEL: HCPCS | Performed by: INTERNAL MEDICINE

## 2021-07-22 PROCEDURE — 85027 COMPLETE CBC AUTOMATED: CPT | Performed by: INTERNAL MEDICINE

## 2021-07-22 PROCEDURE — C1726 CATH, BAL DIL, NON-VASCULAR: HCPCS | Performed by: INTERNAL MEDICINE

## 2021-07-22 PROCEDURE — 80053 COMPREHEN METABOLIC PANEL: CPT | Performed by: INTERNAL MEDICINE

## 2021-07-22 PROCEDURE — 250N000025 HC SEVOFLURANE, PER MIN: Performed by: INTERNAL MEDICINE

## 2021-07-22 PROCEDURE — 74330 X-RAY BILE/PANC ENDOSCOPY: CPT

## 2021-07-22 PROCEDURE — 250N000009 HC RX 250: Performed by: NURSE ANESTHETIST, CERTIFIED REGISTERED

## 2021-07-22 PROCEDURE — 250N000011 HC RX IP 250 OP 636: Performed by: NURSE ANESTHETIST, CERTIFIED REGISTERED

## 2021-07-22 PROCEDURE — 370N000017 HC ANESTHESIA TECHNICAL FEE, PER MIN: Performed by: INTERNAL MEDICINE

## 2021-07-22 PROCEDURE — 85610 PROTHROMBIN TIME: CPT | Performed by: INTERNAL MEDICINE

## 2021-07-22 PROCEDURE — 999N000141 HC STATISTIC PRE-PROCEDURE NURSING ASSESSMENT: Performed by: INTERNAL MEDICINE

## 2021-07-22 PROCEDURE — C1876 STENT, NON-COA/NON-COV W/DEL: HCPCS | Performed by: INTERNAL MEDICINE

## 2021-07-22 PROCEDURE — 258N000003 HC RX IP 258 OP 636: Performed by: NURSE ANESTHETIST, CERTIFIED REGISTERED

## 2021-07-22 PROCEDURE — 710N000012 HC RECOVERY PHASE 2, PER MINUTE: Performed by: INTERNAL MEDICINE

## 2021-07-22 PROCEDURE — 710N000009 HC RECOVERY PHASE 1, LEVEL 1, PER MIN: Performed by: INTERNAL MEDICINE

## 2021-07-22 PROCEDURE — 360N000083 HC SURGERY LEVEL 3 W/ FLUORO, PER MIN: Performed by: INTERNAL MEDICINE

## 2021-07-22 PROCEDURE — 36415 COLL VENOUS BLD VENIPUNCTURE: CPT | Performed by: INTERNAL MEDICINE

## 2021-07-22 DEVICE — STENT JOHLIN PANCREA WEDGE 10FRX22CM W/INTRO G26828
Type: IMPLANTABLE DEVICE | Site: BILE DUCT | Status: NON-FUNCTIONAL
Removed: 2021-08-30

## 2021-07-22 RX ORDER — EPHEDRINE SULFATE 50 MG/ML
INJECTION, SOLUTION INTRAMUSCULAR; INTRAVENOUS; SUBCUTANEOUS PRN
Status: DISCONTINUED | OUTPATIENT
Start: 2021-07-22 | End: 2021-07-22

## 2021-07-22 RX ORDER — NALOXONE HYDROCHLORIDE 0.4 MG/ML
0.2 INJECTION, SOLUTION INTRAMUSCULAR; INTRAVENOUS; SUBCUTANEOUS
Status: DISCONTINUED | OUTPATIENT
Start: 2021-07-22 | End: 2021-07-22 | Stop reason: HOSPADM

## 2021-07-22 RX ORDER — INDOMETHACIN 50 MG/1
100 SUPPOSITORY RECTAL
Status: DISCONTINUED | OUTPATIENT
Start: 2021-07-22 | End: 2021-07-22 | Stop reason: HOSPADM

## 2021-07-22 RX ORDER — SODIUM CHLORIDE, SODIUM LACTATE, POTASSIUM CHLORIDE, CALCIUM CHLORIDE 600; 310; 30; 20 MG/100ML; MG/100ML; MG/100ML; MG/100ML
INJECTION, SOLUTION INTRAVENOUS CONTINUOUS
Status: DISCONTINUED | OUTPATIENT
Start: 2021-07-22 | End: 2021-07-22 | Stop reason: HOSPADM

## 2021-07-22 RX ORDER — LABETALOL HYDROCHLORIDE 5 MG/ML
10 INJECTION, SOLUTION INTRAVENOUS EVERY 10 MIN PRN
Status: DISCONTINUED | OUTPATIENT
Start: 2021-07-22 | End: 2021-07-22 | Stop reason: HOSPADM

## 2021-07-22 RX ORDER — FENTANYL CITRATE 0.05 MG/ML
50 INJECTION, SOLUTION INTRAMUSCULAR; INTRAVENOUS EVERY 5 MIN PRN
Status: DISCONTINUED | OUTPATIENT
Start: 2021-07-22 | End: 2021-07-22 | Stop reason: HOSPADM

## 2021-07-22 RX ORDER — HYDROMORPHONE HCL IN WATER/PF 6 MG/30 ML
0.2 PATIENT CONTROLLED ANALGESIA SYRINGE INTRAVENOUS EVERY 5 MIN PRN
Status: DISCONTINUED | OUTPATIENT
Start: 2021-07-22 | End: 2021-07-22 | Stop reason: HOSPADM

## 2021-07-22 RX ORDER — LIDOCAINE HYDROCHLORIDE 20 MG/ML
INJECTION, SOLUTION INFILTRATION; PERINEURAL PRN
Status: DISCONTINUED | OUTPATIENT
Start: 2021-07-22 | End: 2021-07-22

## 2021-07-22 RX ORDER — ONDANSETRON 2 MG/ML
INJECTION INTRAMUSCULAR; INTRAVENOUS PRN
Status: DISCONTINUED | OUTPATIENT
Start: 2021-07-22 | End: 2021-07-22

## 2021-07-22 RX ORDER — ONDANSETRON 2 MG/ML
4 INJECTION INTRAMUSCULAR; INTRAVENOUS EVERY 30 MIN PRN
Status: DISCONTINUED | OUTPATIENT
Start: 2021-07-22 | End: 2021-07-22 | Stop reason: HOSPADM

## 2021-07-22 RX ORDER — PROPOFOL 10 MG/ML
INJECTION, EMULSION INTRAVENOUS CONTINUOUS PRN
Status: DISCONTINUED | OUTPATIENT
Start: 2021-07-22 | End: 2021-07-22

## 2021-07-22 RX ORDER — ONDANSETRON 2 MG/ML
4 INJECTION INTRAMUSCULAR; INTRAVENOUS EVERY 6 HOURS PRN
Status: DISCONTINUED | OUTPATIENT
Start: 2021-07-22 | End: 2021-07-22 | Stop reason: HOSPADM

## 2021-07-22 RX ORDER — NALOXONE HYDROCHLORIDE 0.4 MG/ML
0.4 INJECTION, SOLUTION INTRAMUSCULAR; INTRAVENOUS; SUBCUTANEOUS
Status: DISCONTINUED | OUTPATIENT
Start: 2021-07-22 | End: 2021-07-22 | Stop reason: HOSPADM

## 2021-07-22 RX ORDER — ONDANSETRON 4 MG/1
4 TABLET, ORALLY DISINTEGRATING ORAL EVERY 6 HOURS PRN
Status: DISCONTINUED | OUTPATIENT
Start: 2021-07-22 | End: 2021-07-22 | Stop reason: HOSPADM

## 2021-07-22 RX ORDER — DEXAMETHASONE SODIUM PHOSPHATE 4 MG/ML
INJECTION, SOLUTION INTRA-ARTICULAR; INTRALESIONAL; INTRAMUSCULAR; INTRAVENOUS; SOFT TISSUE PRN
Status: DISCONTINUED | OUTPATIENT
Start: 2021-07-22 | End: 2021-07-22

## 2021-07-22 RX ORDER — FENTANYL CITRATE 50 UG/ML
INJECTION, SOLUTION INTRAMUSCULAR; INTRAVENOUS PRN
Status: DISCONTINUED | OUTPATIENT
Start: 2021-07-22 | End: 2021-07-22

## 2021-07-22 RX ORDER — MEPERIDINE HYDROCHLORIDE 25 MG/ML
12.5 INJECTION INTRAMUSCULAR; INTRAVENOUS; SUBCUTANEOUS
Status: DISCONTINUED | OUTPATIENT
Start: 2021-07-22 | End: 2021-07-22 | Stop reason: HOSPADM

## 2021-07-22 RX ORDER — FLUMAZENIL 0.1 MG/ML
0.2 INJECTION, SOLUTION INTRAVENOUS
Status: DISCONTINUED | OUTPATIENT
Start: 2021-07-22 | End: 2021-07-22 | Stop reason: HOSPADM

## 2021-07-22 RX ORDER — SODIUM CHLORIDE, SODIUM LACTATE, POTASSIUM CHLORIDE, CALCIUM CHLORIDE 600; 310; 30; 20 MG/100ML; MG/100ML; MG/100ML; MG/100ML
INJECTION, SOLUTION INTRAVENOUS CONTINUOUS PRN
Status: DISCONTINUED | OUTPATIENT
Start: 2021-07-22 | End: 2021-07-22

## 2021-07-22 RX ORDER — LIDOCAINE 40 MG/G
CREAM TOPICAL
Status: DISCONTINUED | OUTPATIENT
Start: 2021-07-22 | End: 2021-07-22 | Stop reason: HOSPADM

## 2021-07-22 RX ORDER — LEVOFLOXACIN 5 MG/ML
INJECTION, SOLUTION INTRAVENOUS PRN
Status: DISCONTINUED | OUTPATIENT
Start: 2021-07-22 | End: 2021-07-22

## 2021-07-22 RX ORDER — ONDANSETRON 4 MG/1
4 TABLET, ORALLY DISINTEGRATING ORAL EVERY 30 MIN PRN
Status: DISCONTINUED | OUTPATIENT
Start: 2021-07-22 | End: 2021-07-22 | Stop reason: HOSPADM

## 2021-07-22 RX ORDER — PROPOFOL 10 MG/ML
INJECTION, EMULSION INTRAVENOUS PRN
Status: DISCONTINUED | OUTPATIENT
Start: 2021-07-22 | End: 2021-07-22

## 2021-07-22 RX ADMIN — FENTANYL CITRATE 100 MCG: 50 INJECTION, SOLUTION INTRAMUSCULAR; INTRAVENOUS at 08:44

## 2021-07-22 RX ADMIN — PROPOFOL 50 MG: 10 INJECTION, EMULSION INTRAVENOUS at 09:02

## 2021-07-22 RX ADMIN — LEVOFLOXACIN 500 MG: 5 INJECTION, SOLUTION INTRAVENOUS at 08:48

## 2021-07-22 RX ADMIN — Medication 8 MCG: at 09:29

## 2021-07-22 RX ADMIN — MIDAZOLAM 2 MG: 1 INJECTION INTRAMUSCULAR; INTRAVENOUS at 08:38

## 2021-07-22 RX ADMIN — SODIUM CHLORIDE, POTASSIUM CHLORIDE, SODIUM LACTATE AND CALCIUM CHLORIDE: 600; 310; 30; 20 INJECTION, SOLUTION INTRAVENOUS at 10:00

## 2021-07-22 RX ADMIN — PROPOFOL 150 MG: 10 INJECTION, EMULSION INTRAVENOUS at 08:44

## 2021-07-22 RX ADMIN — Medication 5 MG: at 09:32

## 2021-07-22 RX ADMIN — PROPOFOL 150 MCG/KG/MIN: 10 INJECTION, EMULSION INTRAVENOUS at 08:52

## 2021-07-22 RX ADMIN — PROPOFOL 20 MG: 10 INJECTION, EMULSION INTRAVENOUS at 09:58

## 2021-07-22 RX ADMIN — SODIUM CHLORIDE, POTASSIUM CHLORIDE, SODIUM LACTATE AND CALCIUM CHLORIDE: 600; 310; 30; 20 INJECTION, SOLUTION INTRAVENOUS at 08:38

## 2021-07-22 RX ADMIN — PHENYLEPHRINE HYDROCHLORIDE 100 MCG: 10 INJECTION INTRAVENOUS at 09:32

## 2021-07-22 RX ADMIN — PHENYLEPHRINE HYDROCHLORIDE 100 MCG: 10 INJECTION INTRAVENOUS at 08:57

## 2021-07-22 RX ADMIN — PHENYLEPHRINE HYDROCHLORIDE 100 MCG: 10 INJECTION INTRAVENOUS at 09:15

## 2021-07-22 RX ADMIN — PHENYLEPHRINE HYDROCHLORIDE 100 MCG: 10 INJECTION INTRAVENOUS at 09:08

## 2021-07-22 RX ADMIN — ONDANSETRON 4 MG: 2 INJECTION INTRAMUSCULAR; INTRAVENOUS at 08:58

## 2021-07-22 RX ADMIN — DEXAMETHASONE SODIUM PHOSPHATE 4 MG: 4 INJECTION, SOLUTION INTRA-ARTICULAR; INTRALESIONAL; INTRAMUSCULAR; INTRAVENOUS; SOFT TISSUE at 08:58

## 2021-07-22 RX ADMIN — SUCCINYLCHOLINE CHLORIDE 60 MG: 20 INJECTION, SOLUTION INTRAMUSCULAR; INTRAVENOUS; PARENTERAL at 08:44

## 2021-07-22 RX ADMIN — PHENYLEPHRINE HYDROCHLORIDE 100 MCG: 10 INJECTION INTRAVENOUS at 09:18

## 2021-07-22 RX ADMIN — PHENYLEPHRINE HYDROCHLORIDE 150 MCG: 10 INJECTION INTRAVENOUS at 09:05

## 2021-07-22 RX ADMIN — LIDOCAINE HYDROCHLORIDE 40 MG: 20 INJECTION, SOLUTION INFILTRATION; PERINEURAL at 08:44

## 2021-07-22 ASSESSMENT — MIFFLIN-ST. JEOR: SCORE: 1180.09

## 2021-07-22 ASSESSMENT — ENCOUNTER SYMPTOMS: SEIZURES: 0

## 2021-07-22 ASSESSMENT — LIFESTYLE VARIABLES: TOBACCO_USE: 0

## 2021-07-22 NOTE — OP NOTE
ERCP 07/22/2021  8:31 AM [address] [place]  [address]   _______________________________________________________________________________   Patient Name: Berta Nava           Procedure Date: 7/22/2021 8:31 AM   MRN: [id number]                       Account Number: [id number]   YOB: 1965              Admit Type: Outpatient   Age: 55                               Room: OR   Note Status: Finalized                Attending MD: Gregory Gabriel MD   Instrument Name: 406 [id number]-Q180V ERCP     _______________________________________________________________________________       Procedure:                ERCP   Indications:              Primary sclerosing cholangitis, 55-year-old with a                             history of PSC causing cirrhosis complicated by                             quiescent UC on mesalamine, large EV (non banded),                             ascites, biliary strictures and                             choledocholithiasis. Here for follow up ERCP.   Providers:                Gregory Gabriel MD, Ana Ross RN, Dillon Mary, Endo                             Tech   Referring MD:               Requesting Provider:      Thomas M. Leventhal, MD   Medicines:                General Anesthesia, Indomethacin 100 mg WA,                             Levaquin 500 mg IV   Complications:            No immediate complications. Estimated blood loss:                             Minimal.   _______________________________________________________________________________   Procedure:                Pre-Anesthesia Assessment:                             - Prior to the procedure, a History and Physical                             was performed, and patient medications and                             allergies were reviewed. The patient is competent.                             The risks and benefits of the procedure and the                             sedation options  and risks were discussed with the                             patient. All questions were answered and informed                             consent was obtained. Patient identification and                             proposed procedure were verified by the physician,                             the nurse, the anesthesiologist and the anesthetist                             in the procedure room. Mental Status Examination:                             alert and oriented. Airway Examination: normal                             oropharyngeal airway and neck mobility. Respiratory                             Examination: clear to auscultation. CV Examination:                             normal. Prophylactic Antibiotics: The patient                             requires prophylactic antibiotics for the planned                             ERCP in an obstructed bile duct. The patient                             received antibiotic therapy before the procedure.                             Prior Anticoagulants: The patient has taken no                             previous anticoagulant or antiplatelet agents. ASA                             Grade Assessment: III - A patient with severe                             systemic disease. After reviewing the risks and                             benefits, the patient was deemed in satisfactory                             condition to undergo the procedure. The anesthesia                             plan was to use general anesthesia. Immediately                             prior to administration of medications, the patient                             was re-assessed for adequacy to receive sedatives.                             The heart rate, respiratory rate, oxygen                             saturations, blood pressure, adequacy of pulmonary                             ventilation, and response to care were monitored                             throughout the procedure. The  physical status of                             the patient was re-assessed after the procedure.                             After obtaining informed consent, the scope was                             passed under direct vision. Throughout the                             procedure, the patient's blood pressure, pulse, and                             oxygen saturations were monitored continuously. The                             Duodenoscope was introduced through the mouth, and                             used to inject contrast into and used to inject                             contrast into the bile duct. The ERCP was                             accomplished without difficulty. The patient                             tolerated the procedure well.                                                                                     Findings:        A biliary stent was visible on the  film. The esophagus was        successfully intubated under direct vision. The scope was advanced from        the mouth to the duodenum. The pharynx, larynx and associated        structures, as well as the upper GI tract, were normal. Two plastic        stents originating in the biliary tree were emerging from the major        papilla. The stents were visibly patent. A biliary sphincterotomy had        been performed. The sphincterotomy appeared open. Two stents were        removed from the biliary tree using a snare. The bile duct was deeply        cannulated with the 12 mm balloon. Contrast was injected. I personally        interpreted the bile duct images. There was brisk flow of contrast        through the ducts. Image quality was excellent. Contrast extended to the        entire biliary tree. The entire biliary tree contained filling defect(s)        thought to be a stone and sludge. Visiglide wire was passed into the        biliary tree. Dilation of the left intrahepatic branches and the right        intrahepatic branches with  a 6 mm balloon dilator was successful. The        biliary tree was swept with a 12 mm balloon starting at the left        intrahepatic duct(s) and right intrahepatic duct(s). Sludge was swept        from the duct. Many stones were removed. A few stones remained. Clots        were swept from the duct. One 10 Fr by 22 cm transpapillary plastic        stent was placed 21 cm into duct into the left hepatic duct. Bile flowed        through the stent. The stent was in good position. One 10 Fr by 17 cm        transpapillary plastic stent was placed 15 cm into the right hepatic        duct. Bile flowed through the stent. The stent was in good position. One        7 Fr by 15 cm transpapillary plastic stent with a single external        pigtail and a single internal flap was placed 15 cm into the left        hepatic duct. Bile flowed through the stent. The stent was in good        position.                                                                                     Impression:               - Distally migrated previously placed biliary                             stents in place. Removed                             - Cholangiogram showed filling defects throughout                             the biliary tree?consistent with stone and sludge.                             Focal stricture off the right main with upstream                             saccular dilations with stones.                             - Branch off right main and left intrahepatic                             dilated to 6 mm                             - Biliary tree swept of small pigmented stones and                             sludge and clot                             - One 10 Fr x 22 cm Johlin stent placed into left                             intrahepatic duct                             - One 10 Fr x 17 cm Johlin stent placed into right                             intrahepatic duct                             - One 7 Fr x 15 cm single pigtail  Octaviomon stent                             placed into likely a right anterior duct that is                             displaced leftward on fluoroscopy                             - Esophageal varices not intervened upon today due                             to concern that early repeat ERCP intervention                             could be needed for recurrent cholangitis and since                             she has not yet had a bleeding episode yet   Recommendation:           - Discharge patient to home (ambulatory).                             - Repeat ERCP in 1 month with Dr. Gabriel for further                             stone clearance as a bridge to liver transplant.                             Earlier if signs of recurrent cholangitis or rising                             LFTs.                             - Above discussed with patient

## 2021-07-22 NOTE — ANESTHESIA POSTPROCEDURE EVALUATION
Patient: Berta Nava    Procedure(s):  ENDOSCOPIC RETROGRADE CHOLANGIOPANCREATOGRAPHY STONE REMOVAL, DILATION AND STENT PLACEMENT    Diagnosis:Primary sclerosing cholangitis [K83.01]  Diagnosis Additional Information: No value filed.    Anesthesia Type:  General    Note:  Disposition: Outpatient   Postop Pain Control: Uneventful            Sign Out: Well controlled pain   PONV: No   Neuro/Psych: Uneventful            Sign Out: Acceptable/Baseline neuro status   Airway/Respiratory: Uneventful            Sign Out: Acceptable/Baseline resp. status   CV/Hemodynamics: Uneventful            Sign Out: Acceptable CV status; No obvious hypovolemia; No obvious fluid overload   Other NRE: NONE   DID A NON-ROUTINE EVENT OCCUR? No           Last vitals:  Vitals Value Taken Time   BP 90/51 07/22/21 1100   Temp 36.4  C (97.6  F) 07/22/21 1100   Pulse 73 07/22/21 1107   Resp 19 07/22/21 1107   SpO2 100 % 07/22/21 1107   Vitals shown include unvalidated device data.    Electronically Signed By: Ricardo Flower MD  July 22, 2021  1:21 PM

## 2021-07-22 NOTE — ANESTHESIA PREPROCEDURE EVALUATION
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]   Procedure : Procedure(s):  ENDOSCOPIC RETROGRADE CHOLANGIOPANCREATOGRAPHY     Past Medical History:   Diagnosis Date     Ascites      Biliary cirrhosis (H)      Cholangitis, sclerosing      Cirrhosis of liver with ascites (H) 3/3/2021     Hearing loss of left ear     wears a hearing aide     History of low potassium      Hyperlipidemia      Hypertension      SBP (spontaneous bacterial peritonitis) (H) 2021     Sjogren's syndrome (H)      Ulcerative colitis (H)      Ulcerative pancolitis (H)       Past Surgical History:   Procedure Laterality Date     ENDOSCOPIC RETROGRADE CHOLANGIOPANCREATOGRAM N/A 2021    Procedure: ENDOSCOPIC RETROGRADE CHOLANGIOPANCREATOGRAPHY with ballon sweep of bile ducts for stones, balloon dilation of bile ducts and bile duct stent placement;  Surgeon: Gregory Gabriel MD;  Location: [facility] OR     ENDOSCOPIC RETROGRADE CHOLANGIOPANCREATOGRAPHY, EXCHANGE TUBE/STENT N/A 2021    Procedure: ENDOSCOPIC RETROGRADE CHOLANGIOPANCREATOGRAPHY WITH STENT EXCHANGE, STONE EXTRACTION, AND DILATION;  Surgeon: Gregory Gabriel MD;  Location: [facility] OR     ENDOSCOPIC RETROGRADE CHOLANGIOPANCREATOGRAPHY, EXCHANGE TUBE/STENT N/A 5/10/2021    Procedure: ENDOSCOPIC RETROGRADE CHOLANGIOPANCREATOGRAPHY with biliary dilation, stone removal, stent exchange;  Surgeon: Gregory Gabriel MD;  Location: [facility] OR     ENDOSCOPIC RETROGRADE CHOLANGIOPANCREATOGRAPHY, EXCHANGE TUBE/STENT N/A 6/10/2021    Procedure: ENDOSCOPIC RETROGRADE CHOLANGIOPANCREATOGRAPHY, WITH biliary stent exchange, stone removal;  Surgeon: Woodrow Lott MD;  Location: [facility] OR     GYN SURGERY      bilat fallopian tubes and ovaries removed     PICC DOUBLE LUMEN PLACEMENT Right 2021    42cm (2cm external), Lateral brachial vein      Allergies   Allergen Reactions      Diagnostic X-Ray Materials Hives     PATIENT HAD HIVE REACTION AFTER ADMINISTERING CT CONTRAST DYE.       Contrast Dye       Social History     Tobacco Use     Smoking status: Never Smoker     Smokeless tobacco: Never Used   Substance Use Topics     Alcohol use: No     Comment: Last drink was 2017      Wt Readings from Last 1 Encounters:   07/22/21 59.4 kg (131 lb)        Anesthesia Evaluation   Pt has had prior anesthetic. Type: General.    No history of anesthetic complications       ROS/MED HX  ENT/Pulmonary:    (-) tobacco use and sleep apnea   Neurologic:    (-) no seizures and no CVA   Cardiovascular: Comment: EF 60%, no valvular abnl    (+) Dyslipidemia hypertension-----    METS/Exercise Tolerance:     Hematologic:       Musculoskeletal:       GI/Hepatic: Comment: Primary sclerosing cholangitis, acute cholangitis, cirrhosis with ascites, ulcerative colitis    (+) liver disease,  (-) GERD   Renal/Genitourinary:    (-) renal disease   Endo:    (-) Type II DM and thyroid disease   Psychiatric/Substance Use:       Infectious Disease:       Malignancy:       Other: Comment: sjogren's           Physical Exam    Airway        Mallampati: II   TM distance: > 3 FB   Neck ROM: full   Mouth opening: > 3 cm    Respiratory Devices and Support         Dental  no notable dental history         Cardiovascular   cardiovascular exam normal          Pulmonary   pulmonary exam normal                OUTSIDE LABS:  CBC:   Lab Results   Component Value Date    WBC 15.2 (H) 06/25/2021    WBC 14.0 (H) 06/21/2021    HGB 10.7 (L) 06/25/2021    HGB 10.7 (L) 06/21/2021    HCT 32.9 (L) 06/25/2021    HCT 32.1 (L) 06/21/2021     06/25/2021     06/21/2021     BMP:   Lab Results   Component Value Date     (L) 06/25/2021     06/21/2021    POTASSIUM 4.4 06/25/2021    POTASSIUM 4.4 06/21/2021    CHLORIDE 100 06/25/2021    CHLORIDE 103 06/21/2021    CO2 21 06/25/2021    CO2 19 (L) 06/21/2021    BUN 23 06/25/2021    BUN  18 06/21/2021    CR 0.69 06/25/2021    CR 0.68 06/21/2021     (H) 06/25/2021     (H) 06/21/2021     COAGS:   Lab Results   Component Value Date    PTT 32 06/09/2021    INR 1.39 (H) 06/25/2021     POC:   Lab Results   Component Value Date    BGM 95 06/25/2021     HEPATIC:   Lab Results   Component Value Date    ALBUMIN 2.8 (L) 06/25/2021    PROTTOTAL 7.4 06/25/2021    ALT 32 06/25/2021    AST 58 (H) 06/25/2021    ALKPHOS 386 (H) 06/25/2021    BILITOTAL 3.3 (H) 06/25/2021    MIHIR 48 06/09/2021     OTHER:   Lab Results   Component Value Date    LACT 1.1 04/30/2021    A1C 4.3 04/29/2021    MARIELENA 9.4 06/25/2021    PHOS 2.6 05/16/2018    MAG 2.7 (H) 05/14/2018    LIPASE 241 06/25/2021    AMYLASE 66 06/25/2021    CRP 23.1 (H) 06/08/2021       Anesthesia Plan    ASA Status:  3   NPO Status:  NPO Appropriate    Anesthesia Type: General.     - Airway: ETT   Induction: Intravenous.   Maintenance: Balanced.        Consents    Anesthesia Plan(s) and associated risks, benefits, and realistic alternatives discussed. Questions answered and patient/representative(s) expressed understanding.     - Discussed with:  Patient         Postoperative Care    Pain management: Multi-modal analgesia.   PONV prophylaxis: Ondansetron (or other 5HT-3), Dexamethasone or Solumedrol, Background Propofol Infusion     Comments:         H&P reviewed: Unable to attach H&P to encounter due to EHR limitations. H&P Update: appropriate H&P reviewed, patient examined. No interval changes since H&P (within 30 days).         Ricardo Flower MD

## 2021-07-22 NOTE — ANESTHESIA PROCEDURE NOTES
Airway       Patient location during procedure: OR       Procedure Start/Stop Times: 7/22/2021 8:46 AM  Staff -        Anesthesiologist:  Ricardo Flower MD       CRNA: Christine Banerjee APRN CRNA       Other Anesthesia Staff: Blanca Arnold       Performed By: SRNA  Consent for Airway        Urgency: elective  Indications and Patient Condition       Indications for airway management: gerardo-procedural       Induction type:intravenous       Mask difficulty assessment: 1 - vent by mask    Final Airway Details       Final airway type: endotracheal airway       Successful airway: ETT - single  Endotracheal Airway Details        ETT size (mm): 7.0       Cuffed: yes       Successful intubation technique: direct laryngoscopy       DL Blade Type: Cheung 2       Grade View of Cords: 1       Adjucts: stylet       Position: Right       Measured from: gums/teeth       Secured at (cm): 22       Bite Block used: endo bite block placed.    Post intubation assessment        Placement verified by: capnometry, equal breath sounds and chest rise        Number of attempts at approach: 1       Number of other approaches attempted: 0       Secured with: pink tape       Ease of procedure: easy       Dentition: Unchanged and Intact

## 2021-07-22 NOTE — DISCHARGE INSTRUCTIONS
Same Day Surgery Discharge Instructions for  Sedation and General Anesthesia       It's not unusual to feel dizzy, light-headed or faint for up to 24 hours after surgery or while taking pain medication.  If you have these symptoms: sit for a few minutes before standing and have someone assist you when you get up to walk or use the bathroom.      You should rest and relax for the next 24 hours. We recommend you make arrangements to have an adult stay with you for at least 24 hours after your discharge.  Avoid hazardous and strenuous activity.      DO NOT DRIVE any vehicle or operate mechanical equipment for 24 hours following the end of your surgery.  Even though you may feel normal, your reactions may be affected by the medication you have received.      Do not drink alcoholic beverages for 24 hours following surgery.       Slowly progress to your regular diet as you feel able. It's not unusual to feel nauseated and/or vomit after receiving anesthesia.  If you develop these symptoms, drink clear liquids (apple juice, ginger ale, broth, 7-up, etc. ) until you feel better.  If your nausea and vomiting persists for 24 hours, please notify your surgeon.        All narcotic pain medications, along with inactivity and anesthesia, can cause constipation. Drinking plenty of liquids and increasing fiber intake will help.      For any questions of a medical nature, call your surgeon.      Do not make important decisions for 24 hours.      If you had general anesthesia, you may have a sore throat for a couple of days related to the breathing tube used during surgery.  You may use Cepacol lozenges to help with this discomfort.  If it worsens or if you develop a fever, contact your surgeon.       If you feel your pain is not well managed with the pain medications prescribed by your surgeon, please contact your surgeon's office to let them know so they can address your concerns.       CoVid 19 Information    We want to give you  information regarding Covid. Please consult your primary care provider with any questions you might have.     Patient who have symptoms (cough, fever, or shortness of breath), need to isolate for 7 days from when symptoms started OR 72 hours after fever resolves (without fever reducing medications) AND improvement of respiratory symptoms (whichever is longer).      Isolate yourself at home (in own room/own bathroom if possible)    Do Not allow any visitors    Do Not go to work or school    Do Not go to S[employer]ism,  centers, shopping, or other public places.    Do Not shake hands.    Avoid close and intimate contact with others (hugging, kissing).    Follow CDC recommendations for household cleaning of frequently touched services.     After the initial 7 days, continue to isolate yourself from household members as much as possible. To continue decrease the risk of community spread and exposure, you and any members of your household should limit activities in public for 14 days after starting home isolation.     You can reference the following CDC link for helpful home isolation/care tips:  https://www.cdc.gov/coronavirus/2019-ncov/downloads/10Things.pdf    Protect Others:    Cover Your Mouth and Nose with a mask, disposable tissue or wash cloth to avoid spreading germs to others.    Wash your hands and face frequently with soap and water    Call Your Primary Doctor If: Breathing difficulty develops or you become worse.    For more information about COVID19 and options for caring for yourself at home, please visit the CDC website at https://www.cdc.gov/coronavirus/2019-ncov/about/steps-when-sick.html  For more options for care at [facility], please visit our website at [email or web address]      MAY RESUME REGULAR DIET PER DR. ECHEVERRIA

## 2021-07-22 NOTE — ANESTHESIA CARE TRANSFER NOTE
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    Anesthesia Type:   General     Note:    Oropharynx: oropharynx clear of all foreign objects  Level of Consciousness: awake  Oxygen Supplementation: face mask  Level of Supplemental Oxygen (L/min / FiO2): 6  Independent Airway: airway patency satisfactory and stable  Dentition: dentition unchanged  Vital Signs Stable: post-procedure vital signs reviewed and stable  Report to RN Given: handoff report given  Patient transferred to: PACU    Handoff Report: Identifed the Patient, Identified the Reponsible Provider, Reviewed the pertinent medical history, Discussed the surgical course, Reviewed Intra-OP anesthesia mangement and issues during anesthesia, Set expectations for post-procedure period and Allowed opportunity for questions and acknowledgement of understanding      Vitals:  Vitals Value Taken Time   BP     Temp     Pulse     Resp     SpO2         Electronically Signed By: CHELSEY Pablo CRNA  July 22, 2021  10:15 AM

## 2021-07-23 DIAGNOSIS — Z11.59 ENCOUNTER FOR SCREENING FOR OTHER VIRAL DISEASES: ICD-10-CM

## 2021-07-26 ENCOUNTER — PATIENT OUTREACH (OUTPATIENT)
Dept: GASTROENTEROLOGY | Facility: CLINIC | Age: 56
End: 2021-07-26

## 2021-07-26 DIAGNOSIS — Z11.59 ENCOUNTER FOR SCREENING FOR OTHER VIRAL DISEASES: Primary | ICD-10-CM

## 2021-07-26 NOTE — PROGRESS NOTES
Called patient to follow up on procedure last week. Pt feeling well, still experiencing ascites.     Procedure/Imaging/Clinic: [facility]   Physician: Harvey   Timin month   Procedure length: 60 min   Anesthesia: Gen   Dx: primary sclerosing cholangitis   Tier: 3   [facility]: [facility] or [facility]    at [facility] discussed, pt in agreement with date and procedure plan    Explained they can expect a call from  for date and time of procedure, will need a , someone to stay with them for 24 hours and should stay in town for 24 hours (within 45 min of Hospital) post procedure    Patient needs to get pre-op physical completed. If outside [facility] system will need physical faxed to number [phone]   If you do not get a preop physical, your procedure could be cancelled, patient voiced understanding*    Preop Plan: understands needs to be within 30 days of procedure, pt will schedule herself    Med Review    Blood thinner -  none  ASA - none  Diabetic - none    COVID test discussed: yes, pt understands needs to be within 4 days of procedure, provided scheduling phone number    Patient Education r/t procedure: curt    Verbalized understanding of all instructions. All questions answered.      Case request placed, message sent to OR     Jeanette Brennan, RN, BSN,   [facility]  Care coordinator   [phone]  Fax [phone]

## 2021-07-27 ENCOUNTER — HOSPITAL ENCOUNTER (OUTPATIENT)
Facility: CLINIC | Age: 56
End: 2021-07-27
Attending: INTERNAL MEDICINE | Admitting: INTERNAL MEDICINE
Payer: COMMERCIAL

## 2021-07-27 DIAGNOSIS — K83.01 PRIMARY SCLEROSING CHOLANGITIS (H): ICD-10-CM

## 2021-07-29 ENCOUNTER — HOSPITAL ENCOUNTER (OUTPATIENT)
Dept: ULTRASOUND IMAGING | Facility: CLINIC | Age: 56
Discharge: HOME | End: 2021-07-29
Attending: INTERNAL MEDICINE | Admitting: INTERNAL MEDICINE
Payer: COMMERCIAL

## 2021-07-29 ENCOUNTER — PATIENT OUTREACH (OUTPATIENT)
Dept: GASTROENTEROLOGY | Facility: CLINIC | Age: 56
End: 2021-07-29

## 2021-07-29 VITALS
OXYGEN SATURATION: 100 % | DIASTOLIC BLOOD PRESSURE: 68 MMHG | HEART RATE: 85 BPM | RESPIRATION RATE: 18 BRPM | SYSTOLIC BLOOD PRESSURE: 127 MMHG

## 2021-07-29 DIAGNOSIS — K74.60 CIRRHOSIS OF LIVER WITH ASCITES, UNSPECIFIED HEPATIC CIRRHOSIS TYPE (H): ICD-10-CM

## 2021-07-29 DIAGNOSIS — R18.8 CIRRHOSIS OF LIVER WITH ASCITES, UNSPECIFIED HEPATIC CIRRHOSIS TYPE (H): ICD-10-CM

## 2021-07-29 PROCEDURE — 49083 ABD PARACENTESIS W/IMAGING: CPT

## 2021-07-29 PROCEDURE — 250N000011 HC RX IP 250 OP 636

## 2021-07-29 PROCEDURE — P9047 ALBUMIN (HUMAN), 25%, 50ML: HCPCS

## 2021-07-29 PROCEDURE — 250N000009 HC RX 250: Performed by: RADIOLOGY

## 2021-07-29 RX ORDER — ALBUMIN (HUMAN) 12.5 G/50ML
25 SOLUTION INTRAVENOUS ONCE
Status: COMPLETED | OUTPATIENT
Start: 2021-07-29 | End: 2021-07-29

## 2021-07-29 RX ORDER — ALBUMIN (HUMAN) 12.5 G/50ML
SOLUTION INTRAVENOUS
Status: COMPLETED
Start: 2021-07-29 | End: 2021-07-29

## 2021-07-29 RX ADMIN — ALBUMIN HUMAN 25 G: 0.25 SOLUTION INTRAVENOUS at 15:39

## 2021-07-29 RX ADMIN — ALBUMIN HUMAN 25 G: 0.25 SOLUTION INTRAVENOUS at 15:56

## 2021-07-29 RX ADMIN — ALBUMIN (HUMAN) 25 G: 12.5 SOLUTION INTRAVENOUS at 15:56

## 2021-07-29 RX ADMIN — LIDOCAINE HYDROCHLORIDE 10 ML: 10 INJECTION, SOLUTION EPIDURAL; INFILTRATION; INTRACAUDAL; PERINEURAL at 15:23

## 2021-07-29 RX ADMIN — ALBUMIN (HUMAN) 25 G: 12.5 SOLUTION INTRAVENOUS at 15:39

## 2021-07-29 NOTE — PROGRESS NOTES
Patient tolerated paracentesis well. Skin, warm and dry. 7200 mls of fabiola fluid removed from right side. Insertion site clean and dry, Band-Aid dressing applied no drainage or bleeding noted.  Vital signs stable. Albumin 50 gms given total. Discharge instructions reviewed with patient. States he understands and has no questions at this time. Discharged ambulatory per self in stable condition.

## 2021-07-29 NOTE — PROGRESS NOTES
Pt called wanting to know if pre op physical is required before procedure on 8/19/21  She understands that she should get one in preparation  No further questions    Jeanette Brennan, RN, BSN,   [facility]  Care coordinator   [phone]  Fax [phone]

## 2021-08-02 ENCOUNTER — LAB (OUTPATIENT)
Dept: LAB | Facility: CLINIC | Age: 56
End: 2021-08-02
Attending: INTERNAL MEDICINE
Payer: COMMERCIAL

## 2021-08-02 DIAGNOSIS — Z11.59 ENCOUNTER FOR SCREENING FOR OTHER VIRAL DISEASES: ICD-10-CM

## 2021-08-02 PROCEDURE — U0003 INFECTIOUS AGENT DETECTION BY NUCLEIC ACID (DNA OR RNA); SEVERE ACUTE RESPIRATORY SYNDROME CORONAVIRUS 2 (SARS-COV-2) (CORONAVIRUS DISEASE [COVID-19]), AMPLIFIED PROBE TECHNIQUE, MAKING USE OF HIGH THROUGHPUT TECHNOLOGIES AS DESCRIBED BY CMS-2020-01-R: HCPCS

## 2021-08-03 LAB — SARS-COV-2 RNA RESP QL NAA+PROBE: NEGATIVE

## 2021-08-05 ENCOUNTER — HOSPITAL ENCOUNTER (OUTPATIENT)
Dept: ULTRASOUND IMAGING | Facility: CLINIC | Age: 56
End: 2021-08-05
Attending: INTERNAL MEDICINE
Payer: COMMERCIAL

## 2021-08-05 ENCOUNTER — LAB (OUTPATIENT)
Dept: LAB | Facility: CLINIC | Age: 56
End: 2021-08-05
Attending: INTERNAL MEDICINE
Payer: COMMERCIAL

## 2021-08-05 VITALS
HEART RATE: 96 BPM | SYSTOLIC BLOOD PRESSURE: 122 MMHG | OXYGEN SATURATION: 100 % | RESPIRATION RATE: 20 BRPM | DIASTOLIC BLOOD PRESSURE: 70 MMHG

## 2021-08-05 DIAGNOSIS — K74.60 CIRRHOSIS OF LIVER WITH ASCITES, UNSPECIFIED HEPATIC CIRRHOSIS TYPE (H): ICD-10-CM

## 2021-08-05 DIAGNOSIS — R18.8 CIRRHOSIS OF LIVER WITH ASCITES, UNSPECIFIED HEPATIC CIRRHOSIS TYPE (H): ICD-10-CM

## 2021-08-05 DIAGNOSIS — K74.60 CIRRHOSIS OF LIVER (H): ICD-10-CM

## 2021-08-05 LAB
ALBUMIN SERPL-MCNC: 3 G/DL (ref 3.4–5)
BILIRUB SERPL-MCNC: 3.8 MG/DL (ref 0.2–1.3)
CREAT SERPL-MCNC: 0.9 MG/DL (ref 0.52–1.04)
GFR SERPL CREATININE-BSD FRML MDRD: 71 ML/MIN/1.73M2
INR PPP: 1.29 (ref 0.85–1.15)
SODIUM SERPL-SCNC: 128 MMOL/L (ref 133–144)

## 2021-08-05 PROCEDURE — 82247 BILIRUBIN TOTAL: CPT

## 2021-08-05 PROCEDURE — 84295 ASSAY OF SERUM SODIUM: CPT

## 2021-08-05 PROCEDURE — 250N000009 HC RX 250: Performed by: RADIOLOGY

## 2021-08-05 PROCEDURE — P9047 ALBUMIN (HUMAN), 25%, 50ML: HCPCS

## 2021-08-05 PROCEDURE — 250N000011 HC RX IP 250 OP 636

## 2021-08-05 PROCEDURE — 272N000706 US PARACENTESIS

## 2021-08-05 PROCEDURE — 82040 ASSAY OF SERUM ALBUMIN: CPT

## 2021-08-05 PROCEDURE — 85610 PROTHROMBIN TIME: CPT

## 2021-08-05 PROCEDURE — 82565 ASSAY OF CREATININE: CPT

## 2021-08-05 PROCEDURE — 36415 COLL VENOUS BLD VENIPUNCTURE: CPT

## 2021-08-05 RX ORDER — ALBUMIN (HUMAN) 12.5 G/50ML
SOLUTION INTRAVENOUS
Status: COMPLETED
Start: 2021-08-05 | End: 2021-08-05

## 2021-08-05 RX ORDER — ALBUMIN (HUMAN) 12.5 G/50ML
25 SOLUTION INTRAVENOUS ONCE
Status: COMPLETED | OUTPATIENT
Start: 2021-08-05 | End: 2021-08-05

## 2021-08-05 RX ADMIN — ALBUMIN (HUMAN) 25 G: 12.5 SOLUTION INTRAVENOUS at 15:31

## 2021-08-05 RX ADMIN — LIDOCAINE HYDROCHLORIDE 10 ML: 10 INJECTION, SOLUTION EPIDURAL; INFILTRATION; INTRACAUDAL; PERINEURAL at 15:15

## 2021-08-05 RX ADMIN — ALBUMIN HUMAN 25 G: 0.25 SOLUTION INTRAVENOUS at 15:31

## 2021-08-05 NOTE — PROGRESS NOTES
Patient tolerated paracentesis well. Skin, warm and dry. 5700 mls of yellow fluid removed from right side. Insertion site clean and dry, Band-Aid dressing applied no drainage or bleeding noted. Vital signs stable. Discharge instructions reviewed with patient. States she understands and has no questions at this time. Discharged ambulatory per self in stable condition.

## 2021-08-11 RX ORDER — LIDOCAINE 40 MG/G
CREAM TOPICAL
Status: CANCELLED | OUTPATIENT
Start: 2021-08-11

## 2021-08-11 RX ORDER — INDOMETHACIN 50 MG/1
100 SUPPOSITORY RECTAL
Status: CANCELLED | OUTPATIENT
Start: 2021-08-11

## 2021-08-12 ENCOUNTER — HOSPITAL ENCOUNTER (OUTPATIENT)
Dept: ULTRASOUND IMAGING | Facility: CLINIC | Age: 56
Discharge: HOME | End: 2021-08-12
Attending: INTERNAL MEDICINE | Admitting: INTERNAL MEDICINE
Payer: COMMERCIAL

## 2021-08-12 VITALS — HEART RATE: 90 BPM | DIASTOLIC BLOOD PRESSURE: 67 MMHG | SYSTOLIC BLOOD PRESSURE: 113 MMHG

## 2021-08-12 DIAGNOSIS — R18.8 CIRRHOSIS OF LIVER WITH ASCITES, UNSPECIFIED HEPATIC CIRRHOSIS TYPE (H): ICD-10-CM

## 2021-08-12 DIAGNOSIS — K74.60 CIRRHOSIS OF LIVER WITH ASCITES, UNSPECIFIED HEPATIC CIRRHOSIS TYPE (H): ICD-10-CM

## 2021-08-12 PROCEDURE — 250N000011 HC RX IP 250 OP 636

## 2021-08-12 PROCEDURE — 272N000706 US PARACENTESIS

## 2021-08-12 PROCEDURE — 250N000009 HC RX 250: Performed by: RADIOLOGY

## 2021-08-12 PROCEDURE — P9047 ALBUMIN (HUMAN), 25%, 50ML: HCPCS

## 2021-08-12 RX ORDER — LIDOCAINE HYDROCHLORIDE 10 MG/ML
10 INJECTION, SOLUTION EPIDURAL; INFILTRATION; INTRACAUDAL; PERINEURAL ONCE
Status: COMPLETED | OUTPATIENT
Start: 2021-08-12 | End: 2021-08-12

## 2021-08-12 RX ORDER — ALBUMIN (HUMAN) 12.5 G/50ML
25 SOLUTION INTRAVENOUS ONCE
Status: COMPLETED | OUTPATIENT
Start: 2021-08-12 | End: 2021-08-12

## 2021-08-12 RX ORDER — ALBUMIN (HUMAN) 12.5 G/50ML
SOLUTION INTRAVENOUS
Status: COMPLETED
Start: 2021-08-12 | End: 2021-08-12

## 2021-08-12 RX ADMIN — ALBUMIN HUMAN 25 G: 0.25 SOLUTION INTRAVENOUS at 15:41

## 2021-08-12 RX ADMIN — LIDOCAINE HYDROCHLORIDE 10 ML: 10 INJECTION, SOLUTION EPIDURAL; INFILTRATION; INTRACAUDAL; PERINEURAL at 15:42

## 2021-08-12 RX ADMIN — ALBUMIN (HUMAN) 25 G: 12.5 SOLUTION INTRAVENOUS at 15:41

## 2021-08-12 NOTE — PROGRESS NOTES
Pt was in Radiology today for a paracentesis. Procedure performed by Dr Solorio Procedure was tolerated well, vitals remained stable.5000 cc's of cloudy yellow colored fluid removed. 25 G Albumin instilled per protocol. Written and verbal instructions were reviewed here is no evidence of bleeding upon discharge.  Pt left department in stable satisfactory condition.

## 2021-08-16 ENCOUNTER — OFFICE VISIT (OUTPATIENT)
Dept: INTERNAL MEDICINE | Facility: CLINIC | Age: 56
End: 2021-08-16
Payer: COMMERCIAL

## 2021-08-16 VITALS
OXYGEN SATURATION: 99 % | WEIGHT: 138 LBS | DIASTOLIC BLOOD PRESSURE: 76 MMHG | HEIGHT: 65 IN | SYSTOLIC BLOOD PRESSURE: 112 MMHG | HEART RATE: 90 BPM | BODY MASS INDEX: 22.99 KG/M2 | TEMPERATURE: 98.1 F

## 2021-08-16 DIAGNOSIS — Z01.818 PREOP GENERAL PHYSICAL EXAM: Primary | ICD-10-CM

## 2021-08-16 DIAGNOSIS — Z11.59 ENCOUNTER FOR SCREENING FOR OTHER VIRAL DISEASES: ICD-10-CM

## 2021-08-16 LAB
ERYTHROCYTE [DISTWIDTH] IN BLOOD BY AUTOMATED COUNT: 15.9 % (ref 10–15)
HCT VFR BLD AUTO: 32.5 % (ref 35–47)
HGB BLD-MCNC: 10.8 G/DL (ref 11.7–15.7)
MCH RBC QN AUTO: 29.9 PG (ref 26.5–33)
MCHC RBC AUTO-ENTMCNC: 33.2 G/DL (ref 31.5–36.5)
MCV RBC AUTO: 90 FL (ref 78–100)
PLATELET # BLD AUTO: 468 10E3/UL (ref 150–450)
RBC # BLD AUTO: 3.61 10E6/UL (ref 3.8–5.2)
WBC # BLD AUTO: 14.3 10E3/UL (ref 4–11)

## 2021-08-16 PROCEDURE — U0005 INFEC AGEN DETEC AMPLI PROBE: HCPCS | Performed by: NURSE PRACTITIONER

## 2021-08-16 PROCEDURE — 85027 COMPLETE CBC AUTOMATED: CPT | Performed by: NURSE PRACTITIONER

## 2021-08-16 PROCEDURE — 36415 COLL VENOUS BLD VENIPUNCTURE: CPT | Performed by: NURSE PRACTITIONER

## 2021-08-16 PROCEDURE — U0003 INFECTIOUS AGENT DETECTION BY NUCLEIC ACID (DNA OR RNA); SEVERE ACUTE RESPIRATORY SYNDROME CORONAVIRUS 2 (SARS-COV-2) (CORONAVIRUS DISEASE [COVID-19]), AMPLIFIED PROBE TECHNIQUE, MAKING USE OF HIGH THROUGHPUT TECHNOLOGIES AS DESCRIBED BY CMS-2020-01-R: HCPCS | Performed by: NURSE PRACTITIONER

## 2021-08-16 PROCEDURE — 80053 COMPREHEN METABOLIC PANEL: CPT | Performed by: NURSE PRACTITIONER

## 2021-08-16 PROCEDURE — 99204 OFFICE O/P NEW MOD 45 MIN: CPT | Performed by: NURSE PRACTITIONER

## 2021-08-16 PROCEDURE — 93000 ELECTROCARDIOGRAM COMPLETE: CPT | Performed by: NURSE PRACTITIONER

## 2021-08-16 ASSESSMENT — MIFFLIN-ST. JEOR: SCORE: 1211.84

## 2021-08-16 NOTE — PROGRESS NOTES
[place] [address]  [facility] [address]  Phone: [phone]  Primary Provider: No Ref-Primary, Physician  Pre-op Performing Provider: ETHAN SCHMIDT      PREOPERATIVE EVALUATION:  Today's date: 8/16/2021    Berta Nava is a 55 year old female who presents for a preoperative evaluation.    Surgical Information:  Surgery/Procedure: ENDOSCOPIC RETROGRADE CHOLANGIOPANCREATOGRAPHY  [facility]  Surgeon: Dr. Gabriel  Surgery Date: 08/19/21  Time of Surgery: 8:55AM  Where patient plans to recover: At home with family  Fax number for surgical facility: Note does not need to be faxed, will be available electronically in Epic.    Type of Anesthesia Anticipated: General    Assessment & Plan     The proposed surgical procedure is considered INTERMEDIATE risk.    Preop general physical exam    - EKG 12-lead complete w/read - Clinics  - CBC with platelets; Future  - Comprehensive metabolic panel (BMP + Alb, Alk Phos, ALT, AST, Total. Bili, TP); Future         Risks and Recommendations:  The patient has the following additional risks and recommendations for perioperative complications:   - No identified additional risk factors other than previously addressed    Medication Instructions:  Patient is to take all scheduled medications on the day of surgery    RECOMMENDATION:  APPROVAL GIVEN to proceed with proposed procedure pending review of diagnostic evaluation.                      Subjective     HPI related to upcoming procedure: cirrhosis of liver    Preop Questions 8/9/2021   1. Have you ever had a heart attack or stroke? No   2. Have you ever had surgery on your heart or blood vessels, such as a stent placement, a coronary artery bypass, or surgery on an artery in your head, neck, heart, or legs? No   3. Do you have chest pain with activity? No   4. Do you have a history of  heart failure? No   5. Do you currently have a cold, bronchitis or symptoms of other  infection? No   6. Do you have a cough, shortness of breath, or wheezing? No   7. Do you or anyone in your family have previous history of blood clots? No   8. Do you or does anyone in your family have a serious bleeding problem such as prolonged bleeding following surgeries or cuts? No   9. Have you ever had problems with anemia or been told to take iron pills? No   10. Have you had any abnormal blood loss such as black, tarry or bloody stools, or abnormal vaginal bleeding? No   11. Have you ever had a blood transfusion? No   12. Are you willing to have a blood transfusion if it is medically needed before, during, or after your surgery? Yes   13. Have you or any of your relatives ever had problems with anesthesia? No   14. Do you have sleep apnea, excessive snoring or daytime drowsiness? No   15. Do you have any artifical heart valves or other implanted medical devices like a pacemaker, defibrillator, or continuous glucose monitor? No   16. Do you have artificial joints? No   17. Are you allergic to latex? No   18. Is there any chance that you may be pregnant? No       Health Care Directive:  Patient has a Health Care Directive on file      Preoperative Review of :   reviewed - no record of controlled substances prescribed.          Review of Systems  CONSTITUTIONAL: NEGATIVE for fever, chills, change in weight  EYES: NEGATIVE for vision changes or irritation  ENT/MOUTH: NEGATIVE for ear, mouth and throat problems  RESP: NEGATIVE for significant cough or SOB  CV: NEGATIVE for chest pain, palpitations or peripheral edema  GI: NEGATIVE for nausea, abdominal pain, heartburn, or change in bowel habits  : NEGATIVE for frequency, dysuria, or hematuria  MUSCULOSKELETAL: NEGATIVE for significant arthralgias or myalgia  NEURO: NEGATIVE for weakness, dizziness or paresthesias  ENDOCRINE: NEGATIVE for temperature intolerance, skin/hair changes  HEME: NEGATIVE for bleeding problems  PSYCHIATRIC: NEGATIVE for changes in  mood or affect    Patient Active Problem List    Diagnosis Date Noted     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     Priority: Medium     Added automatically from request for surgery [id number]       Secondary esophageal varices without bleeding (H) 06/21/2021     Priority: Medium     Encounter for replacement of biliary stent 05/17/2021     Priority: Medium     Added automatically from request for surgery [id number]       SBP (spontaneous bacterial peritonitis) (H) 04/30/2021     Priority: Medium     Choledocholithiasis 04/30/2021     Priority: Medium     Added automatically from request for surgery [id number]       Sepsis (H) 04/29/2021     Priority: Medium     Cirrhosis of liver with ascites (H) 03/03/2021     Priority: Medium     PSC (primary sclerosing cholangitis) 06/12/2019     Priority: Medium     Ulcerative pancolitis with complication (H) 06/12/2019     Priority: Medium     Unsatisfactory cervical Papanicolaou smear 05/04/2019     Priority: Medium     CCSM Review:    History:    3/2019: Pap Unsat, HPV neg  8/2020: NILM, HPV neg    Plan, per ASCCP guidelines: Repeat co-testing in 5 years (08/2025)       Cholangitis 05/13/2018     Priority: Medium     Hypokalemia 05/13/2018     Priority: Medium     Chronic fatigue 12/11/2017     Priority: Medium     Sicca syndrome (H) 12/11/2017     Priority: Medium     Hyperlipidemia 02/23/2012     Priority: Medium     Mixed hearing loss, bilateral 01/07/2009     Priority: Medium     Neoplasm of uncertain behavior of skin 01/26/2007     Priority: Medium     Overview:   LW Modifier:  moderate DN- left buttock  ; Dysplastic Nevus  Overview:   LW Modifier:  recurrent- right upper back  ; Dysplastic Nevus       Essential hypertension 06/07/2003     Priority: Medium     Overview:   Hypertension        Past Medical History:   Diagnosis Date     Ascites      Biliary cirrhosis (H)      Cholangitis, sclerosing      Cirrhosis of liver with ascites (H) 3/3/2021     Hearing loss of left ear      wears a hearing aide     History of low potassium      Hyperlipidemia      Hypertension      SBP (spontaneous bacterial peritonitis) (H) 4/30/2021     Sjogren's syndrome (H)      Ulcerative colitis (H)      Ulcerative pancolitis (H)      Past Surgical History:   Procedure Laterality Date     ENDOSCOPIC RETROGRADE CHOLANGIOPANCREATOGRAM N/A 6/25/2021    Procedure: ENDOSCOPIC RETROGRADE CHOLANGIOPANCREATOGRAPHY with ballon sweep of bile ducts for stones, balloon dilation of bile ducts and bile duct stent placement;  Surgeon: Gregory Gabriel MD;  Location: [facility] OR     ENDOSCOPIC RETROGRADE CHOLANGIOPANCREATOGRAM N/A 7/22/2021    Procedure: ENDOSCOPIC RETROGRADE CHOLANGIOPANCREATOGRAPHY STONE REMOVAL, DILATION AND STENT PLACEMENT;  Surgeon: Gregory Gabriel MD;  Location:  OR     ENDOSCOPIC RETROGRADE CHOLANGIOPANCREATOGRAPHY, EXCHANGE TUBE/STENT N/A 05/05/2021    Procedure: ENDOSCOPIC RETROGRADE CHOLANGIOPANCREATOGRAPHY WITH STENT EXCHANGE, STONE EXTRACTION, AND DILATION;  Surgeon: Gregory Gabriel MD;  Location: [facility] OR     ENDOSCOPIC RETROGRADE CHOLANGIOPANCREATOGRAPHY, EXCHANGE TUBE/STENT N/A 5/10/2021    Procedure: ENDOSCOPIC RETROGRADE CHOLANGIOPANCREATOGRAPHY with biliary dilation, stone removal, stent exchange;  Surgeon: Gregory Gabriel MD;  Location: [facility] OR     ENDOSCOPIC RETROGRADE CHOLANGIOPANCREATOGRAPHY, EXCHANGE TUBE/STENT N/A 6/10/2021    Procedure: ENDOSCOPIC RETROGRADE CHOLANGIOPANCREATOGRAPHY, WITH biliary stent exchange, stone removal;  Surgeon: Woodrow Lott MD;  Location: [facility] OR     GYN SURGERY      bilat fallopian tubes and ovaries removed     PICC DOUBLE LUMEN PLACEMENT Right 05/08/2021    42cm (2cm external), Lateral brachial vein     Current Outpatient Medications   Medication Sig Dispense Refill     calcium carbonate 600 mg-vitamin D 400 units (CALTRATE) 600-400 MG-UNIT per tablet Take 1 tablet by mouth daily       furosemide (LASIX) 20 MG tablet Take 2 tablets (40 mg) by mouth daily 180  "tablet 3     mesalamine (LIALDA) 1.2 g EC tablet Take 2 tablets (2,400 mg) by mouth daily (with breakfast) 180 tablet 1     multivitamin (CENTRUM SILVER) tablet Take 1 tablet by mouth daily       potassium chloride ER (MICRO-K) 10 MEQ CR capsule Take 10 mEq by mouth At Bedtime       simvastatin (ZOCOR) 20 MG tablet Take 20 mg by mouth At Bedtime       spironolactone (ALDACTONE) 50 MG tablet Take 2 tablets (100 mg) by mouth daily 180 tablet 3     ursodiol (ACTIGALL) 300 MG capsule Take 1 capsule (300 mg) by mouth 2 times daily 180 capsule 1       Allergies   Allergen Reactions     Diagnostic X-Ray Materials Hives     PATIENT HAD HIVE REACTION AFTER ADMINISTERING CT CONTRAST DYE.       Contrast Dye         Social History     Tobacco Use     Smoking status: Never Smoker     Smokeless tobacco: Never Used   Substance Use Topics     Alcohol use: No     Comment: Last drink was 2017       History   Drug Use No         Objective     /76 (BP Location: Left arm, Patient Position: Sitting, Cuff Size: Adult Regular)   Pulse 90   Temp 98.1  F (36.7  C) (Oral)   Ht 1.651 m (5' 5\")   Wt 62.6 kg (138 lb)   SpO2 99%   BMI 22.96 kg/m      Physical Exam    GENERAL APPEARANCE: alert and no distress     EYES: conjunctiva mild jaundice     NECK: no adenopathy, no asymmetry, masses, or scars and thyroid normal to palpation     RESP: lungs clear to auscultation - no rales, rhonchi or wheezes     CV: regular rates and rhythm, normal S1 S2, no S3 or S4 and no murmur, click or rub     ABDOMEN: ascites, mild diffuse tenderness     MS: extremities normal- no gross deformities noted, no evidence of inflammation in joints, FROM in all extremities.     NEURO: Normal strength and tone, sensory exam grossly normal, mentation intact and speech normal     PSYCH: mentation appears normal. and affect normal/bright     LYMPHATICS: No cervical adenopathy    Recent Labs   Lab Test 08/05/21  1611 07/22/21  0804 06/25/21  0909 04/30/21  0455 " 04/29/21  1840   HGB  --  10.2* 10.7*  --   --    PLT  --  400 444  --   --    INR 1.29* 1.29* 1.39*   < >  --    * 127* 128*  --   --    POTASSIUM  --  3.7 4.4  --   --    CR 0.90 0.66 0.69  --   --    A1C  --   --   --   --  4.3    < > = values in this interval not displayed.        Diagnostics:  Labs pending at this time.  Results will be reviewed when available.   EKG: appears normal, NSR    Revised Cardiac Risk Index (RCRI):  The patient has the following serious cardiovascular risks for perioperative complications:   - No serious cardiac risks = 0 points     RCRI Interpretation: 0 points: Class I (very low risk - 0.4% complication rate)           Signed Electronically by: Tila Nevarez NP  Copy of this evaluation report is provided to requesting physician.

## 2021-08-17 LAB
ALBUMIN SERPL-MCNC: 2.6 G/DL (ref 3.4–5)
ALP SERPL-CCNC: 388 U/L (ref 40–150)
ALT SERPL W P-5'-P-CCNC: 39 U/L (ref 0–50)
ANION GAP SERPL CALCULATED.3IONS-SCNC: 9 MMOL/L (ref 3–14)
AST SERPL W P-5'-P-CCNC: 73 U/L (ref 0–45)
BILIRUB SERPL-MCNC: 3.6 MG/DL (ref 0.2–1.3)
BUN SERPL-MCNC: 27 MG/DL (ref 7–30)
CALCIUM SERPL-MCNC: 9.5 MG/DL (ref 8.5–10.1)
CHLORIDE BLD-SCNC: 97 MMOL/L (ref 94–109)
CO2 SERPL-SCNC: 20 MMOL/L (ref 20–32)
CREAT SERPL-MCNC: 0.74 MG/DL (ref 0.52–1.04)
GFR SERPL CREATININE-BSD FRML MDRD: 90 ML/MIN/1.73M2
GLUCOSE BLD-MCNC: 112 MG/DL (ref 70–99)
POTASSIUM BLD-SCNC: 4.3 MMOL/L (ref 3.4–5.3)
PROT SERPL-MCNC: 7 G/DL (ref 6.8–8.8)
SARS-COV-2 RNA RESP QL NAA+PROBE: NEGATIVE
SODIUM SERPL-SCNC: 126 MMOL/L (ref 133–144)

## 2021-08-18 ENCOUNTER — ANESTHESIA EVENT (OUTPATIENT)
Dept: MEDSURG UNIT | Facility: CLINIC | Age: 56
End: 2021-08-18

## 2021-08-18 ENCOUNTER — HOSPITAL ENCOUNTER (OUTPATIENT)
Dept: ULTRASOUND IMAGING | Facility: CLINIC | Age: 56
Discharge: HOME | End: 2021-08-18
Attending: INTERNAL MEDICINE | Admitting: INTERNAL MEDICINE
Payer: COMMERCIAL

## 2021-08-18 ENCOUNTER — ORGAN (OUTPATIENT)
Dept: TRANSPLANT | Facility: CLINIC | Age: 56
End: 2021-08-18

## 2021-08-18 ENCOUNTER — ANESTHESIA EVENT (OUTPATIENT)
Dept: SURGERY | Facility: CLINIC | Age: 56
DRG: 420 | End: 2021-08-18
Payer: COMMERCIAL

## 2021-08-18 ENCOUNTER — HOSPITAL ENCOUNTER (INPATIENT)
Facility: CLINIC | Age: 56
LOS: 21 days | Discharge: HOME | DRG: 420 | End: 2021-09-09
Attending: TRANSPLANT SURGERY | Admitting: TRANSPLANT SURGERY
Payer: COMMERCIAL

## 2021-08-18 ENCOUNTER — APPOINTMENT (OUTPATIENT)
Dept: GENERAL RADIOLOGY | Facility: CLINIC | Age: 56
DRG: 420 | End: 2021-08-18
Attending: TRANSPLANT SURGERY
Payer: COMMERCIAL

## 2021-08-18 VITALS
HEART RATE: 88 BPM | DIASTOLIC BLOOD PRESSURE: 71 MMHG | OXYGEN SATURATION: 100 % | RESPIRATION RATE: 16 BRPM | SYSTOLIC BLOOD PRESSURE: 101 MMHG

## 2021-08-18 DIAGNOSIS — Z76.82 LIVER TRANSPLANT PLANNED: ICD-10-CM

## 2021-08-18 DIAGNOSIS — K83.09 CHOLANGITIS (H): ICD-10-CM

## 2021-08-18 DIAGNOSIS — Z76.82 LIVER TRANSPLANT CANDIDATE: Primary | ICD-10-CM

## 2021-08-18 DIAGNOSIS — K74.60 CIRRHOSIS OF LIVER WITH ASCITES, UNSPECIFIED HEPATIC CIRRHOSIS TYPE (H): ICD-10-CM

## 2021-08-18 DIAGNOSIS — K59.00 CONSTIPATION, UNSPECIFIED CONSTIPATION TYPE: ICD-10-CM

## 2021-08-18 DIAGNOSIS — R18.8 CIRRHOSIS OF LIVER WITH ASCITES, UNSPECIFIED HEPATIC CIRRHOSIS TYPE (H): ICD-10-CM

## 2021-08-18 DIAGNOSIS — I46.9: ICD-10-CM

## 2021-08-18 DIAGNOSIS — K92.2 GASTROINTESTINAL HEMORRHAGE, UNSPECIFIED GASTROINTESTINAL HEMORRHAGE TYPE: ICD-10-CM

## 2021-08-18 DIAGNOSIS — E43 SEVERE PROTEIN-CALORIE MALNUTRITION (H): ICD-10-CM

## 2021-08-18 DIAGNOSIS — I46.9 CARDIAC ARREST (H): ICD-10-CM

## 2021-08-18 LAB
ABO/RH(D): NORMAL
ALBUMIN SERPL-MCNC: 2 G/DL (ref 3.4–5)
ALP SERPL-CCNC: 400 U/L (ref 40–150)
ALT SERPL W P-5'-P-CCNC: 36 U/L (ref 0–50)
AMYLASE SERPL-CCNC: 68 U/L (ref 30–110)
ANION GAP SERPL CALCULATED.3IONS-SCNC: 8 MMOL/L (ref 3–14)
ANTIBODY SCREEN: NEGATIVE
APTT PPP: 30 SECONDS (ref 22–38)
AST SERPL W P-5'-P-CCNC: 66 U/L (ref 0–45)
BASOPHILS # BLD AUTO: 0.1 10E3/UL (ref 0–0.2)
BASOPHILS NFR BLD AUTO: 0 %
BILIRUB SERPL-MCNC: 3.6 MG/DL (ref 0.2–1.3)
BUN SERPL-MCNC: 28 MG/DL (ref 7–30)
CALCIUM SERPL-MCNC: 8.6 MG/DL (ref 8.5–10.1)
CHLORIDE BLD-SCNC: 98 MMOL/L (ref 94–109)
CO2 SERPL-SCNC: 20 MMOL/L (ref 20–32)
CREAT SERPL-MCNC: 0.65 MG/DL (ref 0.52–1.04)
EOSINOPHIL # BLD AUTO: 0.4 10E3/UL (ref 0–0.7)
EOSINOPHIL NFR BLD AUTO: 3 %
ERYTHROCYTE [DISTWIDTH] IN BLOOD BY AUTOMATED COUNT: 15.7 % (ref 10–15)
FIBRINOGEN PPP-MCNC: 588 MG/DL (ref 170–490)
GFR SERPL CREATININE-BSD FRML MDRD: >90 ML/MIN/1.73M2
GLUCOSE BLD-MCNC: 100 MG/DL (ref 70–99)
HCT VFR BLD AUTO: 29.6 % (ref 35–47)
HGB BLD-MCNC: 9.8 G/DL (ref 11.7–15.7)
IMM GRANULOCYTES # BLD: 0.1 10E3/UL
IMM GRANULOCYTES NFR BLD: 1 %
INR PPP: 1.45 (ref 0.85–1.15)
LACTATE SERPL-SCNC: 1 MMOL/L (ref 0.7–2)
LYMPHOCYTES # BLD AUTO: 0.8 10E3/UL (ref 0.8–5.3)
LYMPHOCYTES NFR BLD AUTO: 5 %
MAGNESIUM SERPL-MCNC: 2 MG/DL (ref 1.6–2.3)
MCH RBC QN AUTO: 29.9 PG (ref 26.5–33)
MCHC RBC AUTO-ENTMCNC: 33.1 G/DL (ref 31.5–36.5)
MCV RBC AUTO: 90 FL (ref 78–100)
MONOCYTES # BLD AUTO: 1.2 10E3/UL (ref 0–1.3)
MONOCYTES NFR BLD AUTO: 8 %
NEUTROPHILS # BLD AUTO: 12.5 10E3/UL (ref 1.6–8.3)
NEUTROPHILS NFR BLD AUTO: 83 %
NRBC # BLD AUTO: 0 10E3/UL
NRBC BLD AUTO-RTO: 0 /100
PHOSPHATE SERPL-MCNC: 3.7 MG/DL (ref 2.5–4.5)
PLATELET # BLD AUTO: 399 10E3/UL (ref 150–450)
POTASSIUM BLD-SCNC: 3.9 MMOL/L (ref 3.4–5.3)
PROT SERPL-MCNC: 6.7 G/DL (ref 6.8–8.8)
RBC # BLD AUTO: 3.28 10E6/UL (ref 3.8–5.2)
SODIUM SERPL-SCNC: 126 MMOL/L (ref 133–144)
SPECIMEN EXPIRATION DATE: NORMAL
WBC # BLD AUTO: 15.1 10E3/UL (ref 4–11)

## 2021-08-18 PROCEDURE — 85384 FIBRINOGEN ACTIVITY: CPT

## 2021-08-18 PROCEDURE — 86644 CMV ANTIBODY: CPT

## 2021-08-18 PROCEDURE — 85730 THROMBOPLASTIN TIME PARTIAL: CPT

## 2021-08-18 PROCEDURE — 99024 POSTOP FOLLOW-UP VISIT: CPT | Performed by: TRANSPLANT SURGERY

## 2021-08-18 PROCEDURE — 71046 X-RAY EXAM CHEST 2 VIEWS: CPT | Mod: 26 | Performed by: RADIOLOGY

## 2021-08-18 PROCEDURE — 86665 EPSTEIN-BARR CAPSID VCA: CPT

## 2021-08-18 PROCEDURE — 999N000054 HC STATISTIC EKG NON-CHARGEABLE

## 2021-08-18 PROCEDURE — 93005 ELECTROCARDIOGRAM TRACING: CPT

## 2021-08-18 PROCEDURE — 3E043XZ INTRODUCTION OF VASOPRESSOR INTO CENTRAL VEIN, PERCUTANEOUS APPROACH: ICD-10-PCS | Performed by: TRANSPLANT SURGERY

## 2021-08-18 PROCEDURE — 82150 ASSAY OF AMYLASE: CPT

## 2021-08-18 PROCEDURE — 82040 ASSAY OF SERUM ALBUMIN: CPT

## 2021-08-18 PROCEDURE — 49083 ABD PARACENTESIS W/IMAGING: CPT

## 2021-08-18 PROCEDURE — 85025 COMPLETE CBC W/AUTO DIFF WBC: CPT

## 2021-08-18 PROCEDURE — 84100 ASSAY OF PHOSPHORUS: CPT

## 2021-08-18 PROCEDURE — 0W9G3ZZ DRAINAGE OF PERITONEAL CAVITY, PERCUTANEOUS APPROACH: ICD-10-PCS | Performed by: RADIOLOGY

## 2021-08-18 PROCEDURE — 36415 COLL VENOUS BLD VENIPUNCTURE: CPT | Performed by: TRANSPLANT SURGERY

## 2021-08-18 PROCEDURE — 999N000128 HC STATISTIC PERIPHERAL IV START W/O US GUIDANCE

## 2021-08-18 PROCEDURE — 86704 HEP B CORE ANTIBODY TOTAL: CPT

## 2021-08-18 PROCEDURE — 87516 HEPATITIS B DNA AMP PROBE: CPT

## 2021-08-18 PROCEDURE — 93010 ELECTROCARDIOGRAM REPORT: CPT | Mod: 59 | Performed by: INTERNAL MEDICINE

## 2021-08-18 PROCEDURE — 0FJ00ZZ INSPECTION OF LIVER, OPEN APPROACH: ICD-10-PCS | Performed by: TRANSPLANT SURGERY

## 2021-08-18 PROCEDURE — 86901 BLOOD TYPING SEROLOGIC RH(D): CPT

## 2021-08-18 PROCEDURE — 87389 HIV-1 AG W/HIV-1&-2 AB AG IA: CPT

## 2021-08-18 PROCEDURE — 85610 PROTHROMBIN TIME: CPT

## 2021-08-18 PROCEDURE — 5A2204Z RESTORATION OF CARDIAC RHYTHM, SINGLE: ICD-10-PCS | Performed by: TRANSPLANT SURGERY

## 2021-08-18 PROCEDURE — 250N000009 HC RX 250: Performed by: RADIOLOGY

## 2021-08-18 PROCEDURE — 87340 HEPATITIS B SURFACE AG IA: CPT

## 2021-08-18 PROCEDURE — 86645 CMV ANTIBODY IGM: CPT

## 2021-08-18 PROCEDURE — 83605 ASSAY OF LACTIC ACID: CPT | Performed by: TRANSPLANT SURGERY

## 2021-08-18 PROCEDURE — 02HV33Z INSERTION OF INFUSION DEVICE INTO SUPERIOR VENA CAVA, PERCUTANEOUS APPROACH: ICD-10-PCS | Performed by: TRANSPLANT SURGERY

## 2021-08-18 PROCEDURE — 71046 X-RAY EXAM CHEST 2 VIEWS: CPT

## 2021-08-18 PROCEDURE — 86706 HEP B SURFACE ANTIBODY: CPT

## 2021-08-18 PROCEDURE — 272N000706 US PARACENTESIS

## 2021-08-18 PROCEDURE — 36415 COLL VENOUS BLD VENIPUNCTURE: CPT

## 2021-08-18 PROCEDURE — 86803 HEPATITIS C AB TEST: CPT

## 2021-08-18 PROCEDURE — U0005 INFEC AGEN DETEC AMPLI PROBE: HCPCS

## 2021-08-18 PROCEDURE — 83735 ASSAY OF MAGNESIUM: CPT

## 2021-08-18 RX ORDER — PIPERACILLIN SODIUM, TAZOBACTAM SODIUM 3; .375 G/15ML; G/15ML
3.38 INJECTION, POWDER, LYOPHILIZED, FOR SOLUTION INTRAVENOUS ONCE
Status: DISCONTINUED | OUTPATIENT
Start: 2021-08-18 | End: 2021-08-19 | Stop reason: HOSPADM

## 2021-08-18 RX ORDER — SODIUM CHLORIDE, SODIUM LACTATE, POTASSIUM CHLORIDE, CALCIUM CHLORIDE 600; 310; 30; 20 MG/100ML; MG/100ML; MG/100ML; MG/100ML
INJECTION, SOLUTION INTRAVENOUS CONTINUOUS
Status: CANCELLED | OUTPATIENT
Start: 2021-08-18

## 2021-08-18 RX ORDER — FENTANYL CITRATE 50 UG/ML
25 INJECTION, SOLUTION INTRAMUSCULAR; INTRAVENOUS EVERY 5 MIN PRN
Status: CANCELLED | OUTPATIENT
Start: 2021-08-18

## 2021-08-18 RX ORDER — FLUCONAZOLE 2 MG/ML
400 INJECTION, SOLUTION INTRAVENOUS ONCE
Status: COMPLETED | OUTPATIENT
Start: 2021-08-18 | End: 2021-08-19

## 2021-08-18 RX ORDER — ACETAMINOPHEN 325 MG/1
975 TABLET ORAL ONCE
Status: DISCONTINUED | OUTPATIENT
Start: 2021-08-18 | End: 2021-08-19 | Stop reason: HOSPADM

## 2021-08-18 RX ORDER — KETOROLAC TROMETHAMINE 30 MG/ML
15-30 INJECTION, SOLUTION INTRAMUSCULAR; INTRAVENOUS ONCE
Status: CANCELLED | OUTPATIENT
Start: 2021-08-18 | End: 2021-08-18

## 2021-08-18 RX ORDER — OXYCODONE HYDROCHLORIDE 5 MG/1
5 TABLET ORAL EVERY 4 HOURS PRN
Status: CANCELLED | OUTPATIENT
Start: 2021-08-18

## 2021-08-18 RX ORDER — LIDOCAINE 40 MG/G
CREAM TOPICAL
Status: DISCONTINUED | OUTPATIENT
Start: 2021-08-18 | End: 2021-08-19 | Stop reason: HOSPADM

## 2021-08-18 RX ORDER — ONDANSETRON 4 MG/1
4 TABLET, ORALLY DISINTEGRATING ORAL EVERY 30 MIN PRN
Status: CANCELLED | OUTPATIENT
Start: 2021-08-18

## 2021-08-18 RX ORDER — ONDANSETRON 2 MG/ML
4 INJECTION INTRAMUSCULAR; INTRAVENOUS EVERY 30 MIN PRN
Status: CANCELLED | OUTPATIENT
Start: 2021-08-18

## 2021-08-18 RX ORDER — LABETALOL HYDROCHLORIDE 5 MG/ML
10 INJECTION, SOLUTION INTRAVENOUS
Status: CANCELLED | OUTPATIENT
Start: 2021-08-18

## 2021-08-18 RX ORDER — HYDRALAZINE HYDROCHLORIDE 20 MG/ML
2.5-5 INJECTION INTRAMUSCULAR; INTRAVENOUS EVERY 10 MIN PRN
Status: CANCELLED | OUTPATIENT
Start: 2021-08-18

## 2021-08-18 RX ORDER — FENTANYL CITRATE 50 UG/ML
50 INJECTION, SOLUTION INTRAMUSCULAR; INTRAVENOUS
Status: CANCELLED | OUTPATIENT
Start: 2021-08-18

## 2021-08-18 RX ORDER — PIPERACILLIN SODIUM, TAZOBACTAM SODIUM 2; .25 G/10ML; G/10ML
2.25 INJECTION, POWDER, LYOPHILIZED, FOR SOLUTION INTRAVENOUS
Status: DISCONTINUED | OUTPATIENT
Start: 2021-08-18 | End: 2021-08-19 | Stop reason: HOSPADM

## 2021-08-18 RX ORDER — HYDROMORPHONE HYDROCHLORIDE 1 MG/ML
0.2 INJECTION, SOLUTION INTRAMUSCULAR; INTRAVENOUS; SUBCUTANEOUS EVERY 5 MIN PRN
Status: CANCELLED | OUTPATIENT
Start: 2021-08-18

## 2021-08-18 RX ADMIN — LIDOCAINE HYDROCHLORIDE 10 ML: 10 INJECTION, SOLUTION EPIDURAL; INFILTRATION; INTRACAUDAL; PERINEURAL at 15:06

## 2021-08-18 NOTE — ANESTHESIA PREPROCEDURE EVALUATION
Anesthesia Pre-Procedure Evaluation    Patient: Berta Nava   MRN: [id number] : 1965        Preoperative Diagnosis: Primary sclerosing cholangitis [K83.01]   Procedure : Procedure(s):  ENDOSCOPIC RETROGRADE CHOLANGIOPANCREATOGRAPHY     Past Medical History:   Diagnosis Date     Ascites      Biliary cirrhosis (H)      Cholangitis, sclerosing      Cirrhosis of liver with ascites (H) 3/3/2021     Hearing loss of left ear     wears a hearing aide     History of low potassium      Hyperlipidemia      Hypertension      SBP (spontaneous bacterial peritonitis) (H) 2021     Sjogren's syndrome (H)      Ulcerative colitis (H)      Ulcerative pancolitis (H)       Past Surgical History:   Procedure Laterality Date     ENDOSCOPIC RETROGRADE CHOLANGIOPANCREATOGRAM N/A 2021    Procedure: ENDOSCOPIC RETROGRADE CHOLANGIOPANCREATOGRAPHY with ballon sweep of bile ducts for stones, balloon dilation of bile ducts and bile duct stent placement;  Surgeon: Gregory Gabriel MD;  Location: [facility] OR     ENDOSCOPIC RETROGRADE CHOLANGIOPANCREATOGRAM N/A 2021    Procedure: ENDOSCOPIC RETROGRADE CHOLANGIOPANCREATOGRAPHY STONE REMOVAL, DILATION AND STENT PLACEMENT;  Surgeon: Gregory Gabriel MD;  Location: [facility] OR     ENDOSCOPIC RETROGRADE CHOLANGIOPANCREATOGRAPHY, EXCHANGE TUBE/STENT N/A 2021    Procedure: ENDOSCOPIC RETROGRADE CHOLANGIOPANCREATOGRAPHY WITH STENT EXCHANGE, STONE EXTRACTION, AND DILATION;  Surgeon: Gregory Gabriel MD;  Location: [facility] OR     ENDOSCOPIC RETROGRADE CHOLANGIOPANCREATOGRAPHY, EXCHANGE TUBE/STENT N/A 5/10/2021    Procedure: ENDOSCOPIC RETROGRADE CHOLANGIOPANCREATOGRAPHY with biliary dilation, stone removal, stent exchange;  Surgeon: Gregory Gabriel MD;  Location: [facility] OR     ENDOSCOPIC RETROGRADE CHOLANGIOPANCREATOGRAPHY, EXCHANGE TUBE/STENT N/A 6/10/2021    Procedure: ENDOSCOPIC RETROGRADE CHOLANGIOPANCREATOGRAPHY, WITH biliary stent exchange, stone removal;  Surgeon: Woodrow Lott MD;   Location: [facility] OR     GYN SURGERY      bilat fallopian tubes and ovaries removed     PICC DOUBLE LUMEN PLACEMENT Right 05/08/2021    42cm (2cm external), Lateral brachial vein      Allergies   Allergen Reactions     Diagnostic X-Ray Materials Hives     PATIENT HAD HIVE REACTION AFTER ADMINISTERING CT CONTRAST DYE.       Contrast Dye       Social History     Tobacco Use     Smoking status: Never Smoker     Smokeless tobacco: Never Used   Substance Use Topics     Alcohol use: No     Comment: Last drink was 2017      Wt Readings from Last 1 Encounters:   08/16/21 62.6 kg (138 lb)        Anesthesia Evaluation   Pt has had prior anesthetic. Type: General.    No history of anesthetic complications       ROS/MED HX  ENT/Pulmonary:    (-) sleep apnea   Neurologic:       Cardiovascular: Comment: EF 60%, no valvular abnl    (+) Dyslipidemia hypertension-range: controlled/ ----    METS/Exercise Tolerance:     Hematologic:     (+) anemia,     Musculoskeletal:       GI/Hepatic: Comment: Primary sclerosing cholangitis, acute cholangitis, cirrhosis with ascites and varices, ulcerative colitis    (+) liver disease,  (-) GERD   Renal/Genitourinary: Comment: Chronic hyponatremia      Endo:       Psychiatric/Substance Use:       Infectious Disease:       Malignancy:       Other: Comment: sjogren's              OUTSIDE LABS:  CBC:   Lab Results   Component Value Date    WBC 14.3 (H) 08/16/2021    WBC 9.6 07/22/2021    HGB 10.8 (L) 08/16/2021    HGB 10.2 (L) 07/22/2021    HCT 32.5 (L) 08/16/2021    HCT 30.7 (L) 07/22/2021     (H) 08/16/2021     07/22/2021     BMP:   Lab Results   Component Value Date     (L) 08/16/2021     (L) 08/05/2021    POTASSIUM 4.3 08/16/2021    POTASSIUM 3.7 07/22/2021    CHLORIDE 97 08/16/2021    CHLORIDE 99 07/22/2021    CO2 20 08/16/2021    CO2 17 (L) 07/22/2021    BUN 27 08/16/2021    BUN 24 07/22/2021    CR 0.74 08/16/2021    CR 0.90 08/05/2021     (H) 08/16/2021    GLC 96  07/22/2021     COAGS:   Lab Results   Component Value Date    PTT 32 06/09/2021    INR 1.29 (H) 08/05/2021     POC:   Lab Results   Component Value Date    BGM 95 06/25/2021     HEPATIC:   Lab Results   Component Value Date    ALBUMIN 2.6 (L) 08/16/2021    PROTTOTAL 7.0 08/16/2021    ALT 39 08/16/2021    AST 73 (H) 08/16/2021    ALKPHOS 388 (H) 08/16/2021    BILITOTAL 3.6 (H) 08/16/2021    MIHIR 48 06/09/2021     OTHER:   Lab Results   Component Value Date    LACT 1.1 04/30/2021    A1C 4.3 04/29/2021    MARIELENA 9.5 08/16/2021    PHOS 2.6 05/16/2018    MAG 2.7 (H) 05/14/2018    LIPASE 241 06/25/2021    AMYLASE 66 06/25/2021    CRP 23.1 (H) 06/08/2021       Anesthesia Plan    ASA Status:  3   NPO Status:  NPO Appropriate    Anesthesia Type: General.     - Airway: ETT   Induction: Intravenous.   Maintenance: Balanced.        Consents    Anesthesia Plan(s) and associated risks, benefits, and realistic alternatives discussed. Questions answered and patient/representative(s) expressed understanding.     - Discussed with:  Patient         Postoperative Care    Pain management: IV analgesics.   PONV prophylaxis: Ondansetron (or other 5HT-3)     Comments:    H&P reviewed            Jorge Carrillo MD

## 2021-08-18 NOTE — Clinical Note
dry, intact, no bleeding and no hematoma. TR band placed on left wrist with 10cc air for hemostasis. No evidence of bleeding.

## 2021-08-18 NOTE — PROGRESS NOTES
Patient tolerated paracentesis well.  4800 mL of straw colored fluid drained done by Dr Solorio  Dressing clean dry and intact with no drainage.  no albumin given. Patient states understanding discharge instructions, taking P.O and home per self.  Glory Velazquez, RN, BSN

## 2021-08-18 NOTE — H&P
Physician Attestation   I, Ernesto Schmitt MD, saw this patient with the resident and agree with the resident/fellow's findings and plan of care as documented in the note.      I personally reviewed vital signs, medications, and labs.    Key findings: I explained the risk benefits and alternatives of liver transplantation    Ernesto Schmitt MD  Date of Service (when I saw the patient): 21    [facility], [place]    Transplant Surgery  History and Physical    Berta Nava  : 1965  MRN # [id number]    ADMIT DATE: 2021    PCP: No Ref-Primary, Physician    CHIEF COMPLAINT: liver transplant candidate    HPI: Berta Nava is a 55 year old female with 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 She presents today for a potential liver transplant.     She feels in her normal state of health. Has not had any recent changes to her medical history or medications nor any recent illnesses. She gets weekly paracentesis, last today  with 4.8L of straw colored fluid drained. She had an ERCP 1 month ago with intrahepatic stents placed.     She is up to date in her cancer screenings; she had a colonoscopy in Dec 2020, and a mammogram in May 2021.     Denies chest pain, dyspnea, diarrhea, fevers, abdominal pain.     ROS:   CONSTITUTIONAL: Denies fever, chills, fatigue.  HEAD: Denies headache.  EENT: Denies vision changes, hearing changes, dysphagia, or sore throat.   NECK: Denies lymphadenopathy.   CV: Denies chest pain, palpitations, or shortness of breath.   PULMONARY: Denies shortness of breath, cough, or previous TB exposure.   GI: Denies nausea, vomiting, diarrhea, and abdominal pain. Bowel movements are regular.   : Denies urinary alterations, dysuria, urinary frequency, hematuria, and abnormal drainage.   EXT: Denies lower extremity edema.   SKIN: Denies abnormal rashes or  lesions.   MUSCULOSKELETAL: Denies upper or lower extremity weakness and pain.   NEUROLOGIC: Denies lightheadedness, dizziness, seizures, or upper or lower extremity paresthesia.   HEMATOLOGICAL:No abnormal bruising or bleeding.   PSYCHIATRIC:Denies any mood alterations.     PMH:  Past Medical History:   Diagnosis Date    Ascites     Biliary cirrhosis (H)     Cholangitis, sclerosing     Cirrhosis of liver with ascites (H) 3/3/2021    Hearing loss of left ear     wears a hearing aide    History of low potassium     Hyperlipidemia     Hypertension     SBP (spontaneous bacterial peritonitis) (H) 4/30/2021    Sjogren's syndrome (H)     Ulcerative colitis (H)     Ulcerative pancolitis (H)        PSH:  Past Surgical History:   Procedure Laterality Date    ENDOSCOPIC RETROGRADE CHOLANGIOPANCREATOGRAM N/A 6/25/2021    Procedure: ENDOSCOPIC RETROGRADE CHOLANGIOPANCREATOGRAPHY with ballon sweep of bile ducts for stones, balloon dilation of bile ducts and bile duct stent placement;  Surgeon: Gregory Gabriel MD;  Location: [facility] OR    ENDOSCOPIC RETROGRADE CHOLANGIOPANCREATOGRAM N/A 7/22/2021    Procedure: ENDOSCOPIC RETROGRADE CHOLANGIOPANCREATOGRAPHY STONE REMOVAL, DILATION AND STENT PLACEMENT;  Surgeon: Gregory Gabriel MD;  Location: [facility] OR    ENDOSCOPIC RETROGRADE CHOLANGIOPANCREATOGRAPHY, EXCHANGE TUBE/STENT N/A 05/05/2021    Procedure: ENDOSCOPIC RETROGRADE CHOLANGIOPANCREATOGRAPHY WITH STENT EXCHANGE, STONE EXTRACTION, AND DILATION;  Surgeon: Gregory Gabriel MD;  Location: [facility] OR    ENDOSCOPIC RETROGRADE CHOLANGIOPANCREATOGRAPHY, EXCHANGE TUBE/STENT N/A 5/10/2021    Procedure: ENDOSCOPIC RETROGRADE CHOLANGIOPANCREATOGRAPHY with biliary dilation, stone removal, stent exchange;  Surgeon: Gregory Gabriel MD;  Location: [facility] OR    ENDOSCOPIC RETROGRADE CHOLANGIOPANCREATOGRAPHY, EXCHANGE TUBE/STENT N/A 6/10/2021    Procedure: ENDOSCOPIC RETROGRADE CHOLANGIOPANCREATOGRAPHY, WITH biliary stent exchange, stone removal;  Surgeon: Oren  Woodrow Dubon MD;  Location: [facility] OR    GYN SURGERY      bilat fallopian tubes and ovaries removed    PICC DOUBLE LUMEN PLACEMENT Right 05/08/2021    42cm (2cm external), Lateral brachial vein       MEDICATIONS:  Cannot display prior to admission medications because the patient has not been admitted in this contact.        ALLERGIES:     Allergies   Allergen Reactions    Diagnostic X-Ray Materials Hives     PATIENT HAD HIVE REACTION AFTER ADMINISTERING CT CONTRAST DYE.      Contrast Dye        FAMILY HISTORY:  Family History   Problem Relation Age of Onset    Cancer Mother         angiosarcoma    Coronary Artery Disease Early Onset Father         MI age 46    Heart Transplant Father     Liver Disease No family hx of     Ulcerative Colitis No family hx of     Crohn's Disease No family hx of        SOCIAL HISTORY:  Social History     Socioeconomic History    Marital status:      Spouse name: Not on file    Number of children: Not on file    Years of education: Not on file    Highest education level: Not on file   Occupational History    Not on file   Tobacco Use    Smoking status: Never Smoker    Smokeless tobacco: Never Used   Substance and Sexual Activity    Alcohol use: No     Comment: Last drink was 2017    Drug use: No    Sexual activity: Not on file   Other Topics Concern    Parent/sibling w/ CABG, MI or angioplasty before 65F 55M? Not Asked   Social History Narrative    Not on file     Social Determinants of Health     Financial Resource Strain:     Difficulty of Paying Living Expenses:    Food Insecurity:     Worried About Running Out of Food in the Last Year:     Ran Out of Food in the Last Year:    Transportation Needs:     Lack of Transportation (Medical):     Lack of Transportation (Non-Medical):    Physical Activity:     Days of Exercise per Week:     Minutes of Exercise per Session:    Stress:     Feeling of Stress :    Social Connections:     Frequency of Communication with Friends and Family:      Frequency of Social Gatherings with Friends and Family:     Attends Gnosticist Services:     Active Member of Clubs or Organizations:     Attends Club or Organization Meetings:     Marital Status:    Intimate Partner Violence:     Fear of Current or Ex-Partner:     Emotionally Abused:     Physically Abused:     Sexually Abused:        PHYSICAL EXAM:  There were no vitals taken for this visit.  GENERAL: Appears alert and oriented times three.   HEENT: Eye symmetrical and free of discharge bilaterally. Mucous membranes moist and without lesions.  NECK: Supple and without lymphadenopathy.  CV: RRR, S1S2 present without murmur, rub, or gallop.   RESPIRATORY: Respirations regular, even, and unlabored. Lungs CTA throughout.   GI: Soft and distended. No tenderness, rebound, guarding. No organomegaly.   EXTREMITIES: No peripheral edema. 2+ bilateral pedal pulses.   NEUROLOGIC: Alert and orientated x 3. CN II-XII grossly intact. No focal deficits.   MUSCULOSKELETAL: No joint swelling or tenderness.   SKIN: No jaundice. No rashes or lesions.     LABS:  CBC:  Recent Labs   Lab Test 08/16/21  1008   WBC 14.3*   RBC 3.61*   HGB 10.8*   HCT 32.5*   MCV 90   MCH 29.9   MCHC 33.2   RDW 15.9*   *       CMP:  Recent Labs   Lab Test 08/16/21  1008   *   POTASSIUM 4.3   CHLORIDE 97   MARIELENA 9.5   CO2 20   BUN 27   CR 0.74   *   AST 73*   ALT 39   BILITOTAL 3.6*   ALBUMIN 2.6*   PROTTOTAL 7.0   ALKPHOS 388*       IMAGING:    Results for orders placed or performed during the hospital encounter of 08/18/21   XR Chest 2 Views    Impression    RESIDENT PRELIMINARY INTERPRETATION  IMPRESSION:   No acute cardiopulmonary findings.   US Abdomen Limited w Abd/Pelvis Duplex Complete    Impression    RESIDENT PRELIMINARY INTERPRETATION  Impression:     1. Cirrhotic liver with evidence of portal hypertension with  retrograde flow in the portal veins and splenic vein, patent umbilical  vein, and small to moderate volume ascites. No  evidence of portal vein  thrombosis.  2. Gallbladder sludge with wall thickening, which is likely secondary  to cirrhosis/volume status.     ASSESSMENT & PLAN:     Berta Nava is a 55 year old female with 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 She presents today for a potential liver transplant.     - Pre -op labs obtained  - CXR  - EKG  - Pre-op shower  - Given elevated WBC 15.1, will get further infectious workup   - BC, UA, CRP, Procal,    - US Liver with doppler    DISPO:  Continue preop workup   CODE STATUS:  Full  - - - - - - - - - - - - - - - - - -  Lea Jay MD  [facility] General Surgery PGY-1  08/18/2021    See [facility] for on-call pager information.

## 2021-08-19 ENCOUNTER — APPOINTMENT (OUTPATIENT)
Dept: GENERAL RADIOLOGY | Facility: CLINIC | Age: 56
DRG: 420 | End: 2021-08-19
Attending: NURSE PRACTITIONER
Payer: COMMERCIAL

## 2021-08-19 ENCOUNTER — APPOINTMENT (OUTPATIENT)
Dept: ULTRASOUND IMAGING | Facility: CLINIC | Age: 56
DRG: 420 | End: 2021-08-19
Attending: TRANSPLANT SURGERY
Payer: COMMERCIAL

## 2021-08-19 ENCOUNTER — APPOINTMENT (OUTPATIENT)
Dept: GENERAL RADIOLOGY | Facility: CLINIC | Age: 56
DRG: 420 | End: 2021-08-19
Attending: TRANSPLANT SURGERY
Payer: COMMERCIAL

## 2021-08-19 ENCOUNTER — ANESTHESIA (OUTPATIENT)
Dept: SURGERY | Facility: CLINIC | Age: 56
DRG: 420 | End: 2021-08-19
Payer: COMMERCIAL

## 2021-08-19 ENCOUNTER — APPOINTMENT (OUTPATIENT)
Dept: CARDIOLOGY | Facility: CLINIC | Age: 56
DRG: 420 | End: 2021-08-19
Attending: NURSE PRACTITIONER
Payer: COMMERCIAL

## 2021-08-19 ENCOUNTER — APPOINTMENT (OUTPATIENT)
Dept: CT IMAGING | Facility: CLINIC | Age: 56
DRG: 420 | End: 2021-08-19
Attending: SURGERY
Payer: COMMERCIAL

## 2021-08-19 ENCOUNTER — ANESTHESIA (OUTPATIENT)
Dept: MEDSURG UNIT | Facility: CLINIC | Age: 56
End: 2021-08-19

## 2021-08-19 PROBLEM — Z76.82 LIVER TRANSPLANT CANDIDATE: Status: ACTIVE | Noted: 2021-08-19

## 2021-08-19 LAB
ALBUMIN SERPL-MCNC: 3 G/DL (ref 3.4–5)
ALBUMIN UR-MCNC: 10 MG/DL
ALBUMIN UR-MCNC: NEGATIVE MG/DL
ALP SERPL-CCNC: 276 U/L (ref 40–150)
ALT SERPL W P-5'-P-CCNC: 27 U/L (ref 0–50)
ANION GAP SERPL CALCULATED.3IONS-SCNC: 13 MMOL/L (ref 3–14)
ANION GAP SERPL CALCULATED.3IONS-SCNC: 13 MMOL/L (ref 3–14)
APPEARANCE UR: ABNORMAL
APPEARANCE UR: CLEAR
AST SERPL W P-5'-P-CCNC: 51 U/L (ref 0–45)
ATRIAL RATE - MUSE: 95 BPM
BASE EXCESS BLDA CALC-SCNC: -6.1 MMOL/L (ref -9.6–2)
BASE EXCESS BLDA CALC-SCNC: -6.9 MMOL/L (ref -9–1.8)
BASE EXCESS BLDA CALC-SCNC: -7.2 MMOL/L (ref -9.6–2)
BASE EXCESS BLDA CALC-SCNC: -7.6 MMOL/L (ref -9.6–2)
BASE EXCESS BLDA CALC-SCNC: -8.7 MMOL/L (ref -9.6–2)
BASE EXCESS BLDA CALC-SCNC: -9.4 MMOL/L (ref -9.6–2)
BASE EXCESS BLDA CALC-SCNC: -9.6 MMOL/L (ref -9–1.8)
BILIRUB SERPL-MCNC: 3.8 MG/DL (ref 0.2–1.3)
BILIRUB UR QL STRIP: ABNORMAL
BILIRUB UR QL STRIP: ABNORMAL
BLD PROD TYP BPU: NORMAL
BLOOD COMPONENT TYPE: NORMAL
BUN SERPL-MCNC: 18 MG/DL (ref 7–30)
BUN SERPL-MCNC: 19 MG/DL (ref 7–30)
CA-I BLD-MCNC: 4.6 MG/DL (ref 4.4–5.2)
CA-I BLD-MCNC: 4.7 MG/DL (ref 4.4–5.2)
CA-I BLD-MCNC: 4.7 MG/DL (ref 4.4–5.2)
CA-I BLD-MCNC: 4.9 MG/DL (ref 4.4–5.2)
CA-I BLD-MCNC: 4.9 MG/DL (ref 4.4–5.2)
CALCIUM SERPL-MCNC: 8 MG/DL (ref 8.5–10.1)
CALCIUM SERPL-MCNC: 8.6 MG/DL (ref 8.5–10.1)
CF REDUC 30M P MA P HEP LENFR BLD TEG: 0.2 % (ref 0–8)
CF REDUC 60M P MA LENFR BLD TEG: 0 % (ref 0–15)
CF REDUC 60M P MA P HEPASE LENFR BLD TEG: 1.8 % (ref 0–15)
CFT BLD TEG: 1.1 MINUTE (ref 1–3)
CFT P HPASE BLD TEG: 1.2 MINUTE (ref 1–3)
CHLORIDE BLD-SCNC: 103 MMOL/L (ref 94–109)
CHLORIDE BLD-SCNC: 98 MMOL/L (ref 94–109)
CI (COAGULATION INDEX) NATIVE: 4.3 (ref -3–3)
CI (COAGULATION INDEX): 5 (ref -3–3)
CLOT ANGLE BLD TEG: 77.7 DEGREES (ref 59–74)
CLOT ANGLE P HPASE BLD TEG: 76 DEGREES (ref 59–74)
CLOT INIT BLD TEG: 4.8 MINUTE (ref 4–9)
CLOT INIT P HPASE BLD TEG: 5.2 MINUTE (ref 4–9)
CLOT LYSIS 30M P MA LENFR BLD TEG: 0 % (ref 0–8)
CLOT STRENGTH BLD TEG: 30.3 KD/SC (ref 5.3–13.2)
CLOT STRENGTH P HPASE BLD TEG: 23.7 KD/SC (ref 5.3–13.2)
CMV IGG SERPL IA-ACNC: <0.2 U/ML
CMV IGG SERPL IA-ACNC: NORMAL
CMV IGM SERPL IA-ACNC: <8 AU/ML
CMV IGM SERPL IA-ACNC: NEGATIVE
CO2 SERPL-SCNC: 17 MMOL/L (ref 20–32)
CO2 SERPL-SCNC: 17 MMOL/L (ref 20–32)
CODING SYSTEM: NORMAL
COLOR UR AUTO: ABNORMAL
COLOR UR AUTO: YELLOW
CREAT SERPL-MCNC: 0.47 MG/DL (ref 0.52–1.04)
CREAT SERPL-MCNC: 0.57 MG/DL (ref 0.52–1.04)
CROSSMATCH: NORMAL
CRP SERPL-MCNC: 40 MG/L (ref 0–8)
DIASTOLIC BLOOD PRESSURE - MUSE: NORMAL MMHG
EBV VCA IGG SER IA-ACNC: >750 U/ML
EBV VCA IGG SER IA-ACNC: POSITIVE
EBV VCA IGM SER IA-ACNC: <10 U/ML
EBV VCA IGM SER IA-ACNC: NORMAL
ERYTHROCYTE [DISTWIDTH] IN BLOOD BY AUTOMATED COUNT: 15 % (ref 10–15)
ERYTHROCYTE [DISTWIDTH] IN BLOOD BY AUTOMATED COUNT: 15.5 % (ref 10–15)
GFR SERPL CREATININE-BSD FRML MDRD: >90 ML/MIN/1.73M2
GFR SERPL CREATININE-BSD FRML MDRD: >90 ML/MIN/1.73M2
GLUCOSE BLD-MCNC: 122 MG/DL (ref 70–99)
GLUCOSE BLD-MCNC: 128 MG/DL (ref 70–99)
GLUCOSE BLD-MCNC: 129 MG/DL (ref 70–99)
GLUCOSE BLD-MCNC: 141 MG/DL (ref 70–99)
GLUCOSE BLD-MCNC: 89 MG/DL (ref 70–99)
GLUCOSE BLD-MCNC: 92 MG/DL (ref 70–99)
GLUCOSE BLD-MCNC: 94 MG/DL (ref 70–99)
GLUCOSE UR STRIP-MCNC: NEGATIVE MG/DL
GLUCOSE UR STRIP-MCNC: NEGATIVE MG/DL
GRAM STAIN RESULT: NORMAL
GRAM STAIN RESULT: NORMAL
HBV CORE AB SERPL QL IA: NONREACTIVE
HBV SURFACE AB SERPL IA-ACNC: 0.57 M[IU]/ML
HBV SURFACE AG SERPL QL IA: NONREACTIVE
HCO3 BLD-SCNC: 18 MMOL/L (ref 21–28)
HCO3 BLD-SCNC: 19 MMOL/L (ref 21–28)
HCO3 BLDA-SCNC: 17 MMOL/L (ref 21–28)
HCO3 BLDA-SCNC: 18 MMOL/L (ref 21–28)
HCO3 BLDA-SCNC: 18 MMOL/L (ref 21–28)
HCO3 BLDA-SCNC: 19 MMOL/L (ref 21–28)
HCO3 BLDA-SCNC: 19 MMOL/L (ref 21–28)
HCT VFR BLD AUTO: 32.6 % (ref 35–47)
HCT VFR BLD AUTO: 33.7 % (ref 35–47)
HCV AB SERPL QL IA: NONREACTIVE
HGB BLD-MCNC: 10.2 G/DL (ref 11.7–15.7)
HGB BLD-MCNC: 10.6 G/DL (ref 11.7–15.7)
HGB BLD-MCNC: 10.7 G/DL (ref 11.7–15.7)
HGB BLD-MCNC: 11.1 G/DL (ref 11.7–15.7)
HGB BLD-MCNC: 11.2 G/DL (ref 11.7–15.7)
HGB BLD-MCNC: 8 G/DL (ref 11.7–15.7)
HGB BLD-MCNC: 8.9 G/DL (ref 11.7–15.7)
HGB BLD-MCNC: 9 G/DL (ref 11.7–15.7)
HGB BLD-MCNC: 9.9 G/DL (ref 11.7–15.7)
HGB UR QL STRIP: ABNORMAL
HGB UR QL STRIP: NEGATIVE
HIV 1+2 AB+HIV1 P24 AG SERPL QL IA: NONREACTIVE
HYALINE CASTS: 3 /LPF
HYALINE CASTS: 43 /LPF
INR PPP: 1.51 (ref 0.85–1.15)
INR PPP: 1.61 (ref 0.85–1.15)
INTERPRETATION ECG - MUSE: NORMAL
ISSUE DATE AND TIME: NORMAL
KETONES UR STRIP-MCNC: NEGATIVE MG/DL
KETONES UR STRIP-MCNC: NEGATIVE MG/DL
LACTATE BLD-SCNC: 0.6 MMOL/L
LACTATE BLD-SCNC: 0.8 MMOL/L
LACTATE BLD-SCNC: 1.1 MMOL/L
LACTATE BLD-SCNC: 1.6 MMOL/L
LACTATE BLD-SCNC: 1.9 MMOL/L
LACTATE SERPL-SCNC: 2.3 MMOL/L (ref 0.7–2)
LACTATE SERPL-SCNC: 4.1 MMOL/L (ref 0.7–2)
LACTATE SERPL-SCNC: 4.8 MMOL/L (ref 0.7–2)
LEUKOCYTE ESTERASE UR QL STRIP: NEGATIVE
LEUKOCYTE ESTERASE UR QL STRIP: NEGATIVE
LVEF ECHO: NORMAL
MAGNESIUM SERPL-MCNC: 2.2 MG/DL (ref 1.6–2.3)
MCF BLD TEG: 85.8 MM (ref 55–74)
MCF P HPASE BLD TEG: 82.6 MM (ref 55–74)
MCH RBC QN AUTO: 29.8 PG (ref 26.5–33)
MCH RBC QN AUTO: 30.1 PG (ref 26.5–33)
MCHC RBC AUTO-ENTMCNC: 32.9 G/DL (ref 31.5–36.5)
MCHC RBC AUTO-ENTMCNC: 34.4 G/DL (ref 31.5–36.5)
MCV RBC AUTO: 88 FL (ref 78–100)
MCV RBC AUTO: 90 FL (ref 78–100)
MUCOUS THREADS #/AREA URNS LPF: PRESENT /LPF
MUCOUS THREADS #/AREA URNS LPF: PRESENT /LPF
NITRATE UR QL: NEGATIVE
NITRATE UR QL: NEGATIVE
O2/TOTAL GAS SETTING VFR VENT: 60 %
O2/TOTAL GAS SETTING VFR VENT: 60 %
OXYHGB MFR BLDA: 91 % (ref 92–100)
OXYHGB MFR BLDA: 92 % (ref 92–100)
OXYHGB MFR BLDA: 96 % (ref 92–100)
OXYHGB MFR BLDA: 98 % (ref 92–100)
OXYHGB MFR BLDA: 99 % (ref 92–100)
P AXIS - MUSE: 0 DEGREES
PCO2 BLD: 33 MM HG (ref 35–45)
PCO2 BLD: 50 MM HG (ref 35–45)
PCO2 BLDA: 36 MM HG (ref 35–45)
PCO2 BLDA: 38 MM HG (ref 35–45)
PCO2 BLDA: 42 MM HG (ref 35–45)
PH BLD: 7.18 [PH] (ref 7.35–7.45)
PH BLD: 7.34 [PH] (ref 7.35–7.45)
PH BLDA: 7.26 [PH] (ref 7.35–7.45)
PH BLDA: 7.27 [PH] (ref 7.35–7.45)
PH BLDA: 7.27 [PH] (ref 7.35–7.45)
PH BLDA: 7.32 [PH] (ref 7.35–7.45)
PH BLDA: 7.34 [PH] (ref 7.35–7.45)
PH UR STRIP: 5 [PH] (ref 5–7)
PH UR STRIP: 5.5 [PH] (ref 5–7)
PHOSPHATE SERPL-MCNC: 3.6 MG/DL (ref 2.5–4.5)
PLATELET # BLD AUTO: 422 10E3/UL (ref 150–450)
PLATELET # BLD AUTO: 466 10E3/UL (ref 150–450)
PO2 BLD: 102 MM HG (ref 80–105)
PO2 BLD: 88 MM HG (ref 80–105)
PO2 BLDA: 183 MM HG (ref 80–105)
PO2 BLDA: 288 MM HG (ref 80–105)
PO2 BLDA: 70 MM HG (ref 80–105)
PO2 BLDA: 71 MM HG (ref 80–105)
PO2 BLDA: 98 MM HG (ref 80–105)
POTASSIUM BLD-SCNC: 3 MMOL/L (ref 3.4–5.3)
POTASSIUM BLD-SCNC: 3.5 MMOL/L (ref 3.5–5)
POTASSIUM BLD-SCNC: 3.8 MMOL/L (ref 3.5–5)
POTASSIUM BLD-SCNC: 3.9 MMOL/L (ref 3.5–5)
POTASSIUM BLD-SCNC: 4.5 MMOL/L (ref 3.4–5.3)
PR INTERVAL - MUSE: 142 MS
PROCALCITONIN SERPL-MCNC: 0.46 NG/ML
PROT SERPL-MCNC: 6.3 G/DL (ref 6.8–8.8)
QRS DURATION - MUSE: 84 MS
QT - MUSE: 354 MS
QTC - MUSE: 444 MS
R AXIS - MUSE: 36 DEGREES
RADIOLOGIST FLAGS: ABNORMAL
RADIOLOGIST FLAGS: ABNORMAL
RBC # BLD AUTO: 3.72 10E6/UL (ref 3.8–5.2)
RBC # BLD AUTO: 3.73 10E6/UL (ref 3.8–5.2)
RBC URINE: 6 /HPF
RBC URINE: <1 /HPF
SARS-COV-2 RNA RESP QL NAA+PROBE: NEGATIVE
SODIUM BLD-SCNC: 127 MMOL/L (ref 133–144)
SODIUM BLD-SCNC: 129 MMOL/L (ref 133–144)
SODIUM BLD-SCNC: 129 MMOL/L (ref 133–144)
SODIUM BLD-SCNC: 130 MMOL/L (ref 133–144)
SODIUM BLD-SCNC: 133 MMOL/L (ref 133–144)
SODIUM SERPL-SCNC: 128 MMOL/L (ref 133–144)
SODIUM SERPL-SCNC: 133 MMOL/L (ref 133–144)
SP GR UR STRIP: 1.02 (ref 1–1.03)
SP GR UR STRIP: 1.02 (ref 1–1.03)
SQUAMOUS EPITHELIAL: 1 /HPF
SQUAMOUS EPITHELIAL: 4 /HPF
SYSTOLIC BLOOD PRESSURE - MUSE: NORMAL MMHG
T AXIS - MUSE: 23 DEGREES
TRANSITIONAL EPI: <1 /HPF
TROPONIN I SERPL-MCNC: 0.15 UG/L (ref 0–0.04)
TROPONIN I SERPL-MCNC: 0.31 UG/L (ref 0–0.04)
TROPONIN I SERPL-MCNC: 0.33 UG/L (ref 0–0.04)
UNIT ABO/RH: NORMAL
UNIT NUMBER: NORMAL
UNIT STATUS: NORMAL
UNIT TYPE ISBT: 2800
UNIT TYPE ISBT: 6200
UNIT TYPE ISBT: 8400
UROBILINOGEN UR STRIP-MCNC: 3 MG/DL
UROBILINOGEN UR STRIP-MCNC: NORMAL MG/DL
VENTRICULAR RATE- MUSE: 95 BPM
WBC # BLD AUTO: 29.2 10E3/UL (ref 4–11)
WBC # BLD AUTO: 42.3 10E3/UL (ref 4–11)
WBC URINE: 1 /HPF
WBC URINE: 2 /HPF

## 2021-08-19 PROCEDURE — 85018 HEMOGLOBIN: CPT

## 2021-08-19 PROCEDURE — 85396 CLOTTING ASSAY WHOLE BLOOD: CPT | Performed by: TRANSPLANT SURGERY

## 2021-08-19 PROCEDURE — 258N000003 HC RX IP 258 OP 636

## 2021-08-19 PROCEDURE — 250N000011 HC RX IP 250 OP 636

## 2021-08-19 PROCEDURE — 99291 CRITICAL CARE FIRST HOUR: CPT | Mod: 24 | Performed by: SURGERY

## 2021-08-19 PROCEDURE — 370N000017 HC ANESTHESIA TECHNICAL FEE, PER MIN: Performed by: TRANSPLANT SURGERY

## 2021-08-19 PROCEDURE — 83605 ASSAY OF LACTIC ACID: CPT | Performed by: NURSE PRACTITIONER

## 2021-08-19 PROCEDURE — 250N000009 HC RX 250: Performed by: NURSE ANESTHETIST, CERTIFIED REGISTERED

## 2021-08-19 PROCEDURE — 82803 BLOOD GASES ANY COMBINATION: CPT | Performed by: NURSE PRACTITIONER

## 2021-08-19 PROCEDURE — 87075 CULTR BACTERIA EXCEPT BLOOD: CPT | Performed by: TRANSPLANT SURGERY

## 2021-08-19 PROCEDURE — 82803 BLOOD GASES ANY COMBINATION: CPT

## 2021-08-19 PROCEDURE — 258N000003 HC RX IP 258 OP 636: Performed by: NURSE ANESTHETIST, CERTIFIED REGISTERED

## 2021-08-19 PROCEDURE — 82810 BLOOD GASES O2 SAT ONLY: CPT

## 2021-08-19 PROCEDURE — 250N000011 HC RX IP 250 OP 636: Performed by: TRANSPLANT SURGERY

## 2021-08-19 PROCEDURE — 360N000078 HC SURGERY LEVEL 5, PER MIN: Performed by: TRANSPLANT SURGERY

## 2021-08-19 PROCEDURE — 86140 C-REACTIVE PROTEIN: CPT

## 2021-08-19 PROCEDURE — 84145 PROCALCITONIN (PCT): CPT

## 2021-08-19 PROCEDURE — 93308 TTE F-UP OR LMTD: CPT

## 2021-08-19 PROCEDURE — 87102 FUNGUS ISOLATION CULTURE: CPT | Performed by: TRANSPLANT SURGERY

## 2021-08-19 PROCEDURE — 85027 COMPLETE CBC AUTOMATED: CPT | Performed by: NURSE PRACTITIONER

## 2021-08-19 PROCEDURE — 71275 CT ANGIOGRAPHY CHEST: CPT | Mod: 26 | Performed by: RADIOLOGY

## 2021-08-19 PROCEDURE — 36415 COLL VENOUS BLD VENIPUNCTURE: CPT | Performed by: TRANSPLANT SURGERY

## 2021-08-19 PROCEDURE — 85027 COMPLETE CBC AUTOMATED: CPT

## 2021-08-19 PROCEDURE — 74018 RADEX ABDOMEN 1 VIEW: CPT | Mod: 26 | Performed by: RADIOLOGY

## 2021-08-19 PROCEDURE — 999N000045 HC STATISTIC DAILY SWAN MONITORING

## 2021-08-19 PROCEDURE — 999N000065 XR ABDOMEN PORT 1 VIEWS

## 2021-08-19 PROCEDURE — 84295 ASSAY OF SERUM SODIUM: CPT

## 2021-08-19 PROCEDURE — 83605 ASSAY OF LACTIC ACID: CPT

## 2021-08-19 PROCEDURE — 258N000003 HC RX IP 258 OP 636: Performed by: TRANSPLANT SURGERY

## 2021-08-19 PROCEDURE — 272N000085 HC PACK CELL SAVER CSP: Performed by: TRANSPLANT SURGERY

## 2021-08-19 PROCEDURE — 84100 ASSAY OF PHOSPHORUS: CPT | Performed by: NURSE PRACTITIONER

## 2021-08-19 PROCEDURE — 250N000011 HC RX IP 250 OP 636: Performed by: NURSE ANESTHETIST, CERTIFIED REGISTERED

## 2021-08-19 PROCEDURE — 87205 SMEAR GRAM STAIN: CPT | Performed by: TRANSPLANT SURGERY

## 2021-08-19 PROCEDURE — P9016 RBC LEUKOCYTES REDUCED: HCPCS | Performed by: TRANSPLANT SURGERY

## 2021-08-19 PROCEDURE — 93308 TTE F-UP OR LMTD: CPT | Mod: 26 | Performed by: INTERNAL MEDICINE

## 2021-08-19 PROCEDURE — 93503 INSERT/PLACE HEART CATHETER: CPT

## 2021-08-19 PROCEDURE — 99024 POSTOP FOLLOW-UP VISIT: CPT | Performed by: TRANSPLANT SURGERY

## 2021-08-19 PROCEDURE — 87070 CULTURE OTHR SPECIMN AEROBIC: CPT | Performed by: TRANSPLANT SURGERY

## 2021-08-19 PROCEDURE — 87081 CULTURE SCREEN ONLY: CPT

## 2021-08-19 PROCEDURE — 36415 COLL VENOUS BLD VENIPUNCTURE: CPT

## 2021-08-19 PROCEDURE — 71045 X-RAY EXAM CHEST 1 VIEW: CPT | Mod: 26 | Performed by: RADIOLOGY

## 2021-08-19 PROCEDURE — 84484 ASSAY OF TROPONIN QUANT: CPT | Performed by: STUDENT IN AN ORGANIZED HEALTH CARE EDUCATION/TRAINING PROGRAM

## 2021-08-19 PROCEDURE — 84295 ASSAY OF SERUM SODIUM: CPT | Performed by: NURSE PRACTITIONER

## 2021-08-19 PROCEDURE — 81001 URINALYSIS AUTO W/SCOPE: CPT

## 2021-08-19 PROCEDURE — 93321 DOPPLER ECHO F-UP/LMTD STD: CPT | Mod: 26 | Performed by: INTERNAL MEDICINE

## 2021-08-19 PROCEDURE — 82330 ASSAY OF CALCIUM: CPT

## 2021-08-19 PROCEDURE — 999N000015 HC STATISTIC ARTERIAL MONITORING DAILY

## 2021-08-19 PROCEDURE — 93975 VASCULAR STUDY: CPT | Mod: 26 | Performed by: RADIOLOGY

## 2021-08-19 PROCEDURE — 93975 VASCULAR STUDY: CPT

## 2021-08-19 PROCEDURE — 410N000003 HC PER-PERFUSION 1ST 30 MIN: Performed by: TRANSPLANT SURGERY

## 2021-08-19 PROCEDURE — P9059 PLASMA, FRZ BETWEEN 8-24HOUR: HCPCS | Performed by: TRANSPLANT SURGERY

## 2021-08-19 PROCEDURE — 258N000003 HC RX IP 258 OP 636: Performed by: NURSE PRACTITIONER

## 2021-08-19 PROCEDURE — 250N000011 HC RX IP 250 OP 636: Performed by: NURSE PRACTITIONER

## 2021-08-19 PROCEDURE — 999N000063 XR CHEST PORT 1 VIEW

## 2021-08-19 PROCEDURE — 85610 PROTHROMBIN TIME: CPT | Performed by: NURSE PRACTITIONER

## 2021-08-19 PROCEDURE — 83735 ASSAY OF MAGNESIUM: CPT | Performed by: NURSE PRACTITIONER

## 2021-08-19 PROCEDURE — 272N000001 HC OR GENERAL SUPPLY STERILE: Performed by: TRANSPLANT SURGERY

## 2021-08-19 PROCEDURE — 93325 DOPPLER ECHO COLOR FLOW MAPG: CPT | Mod: 26 | Performed by: INTERNAL MEDICINE

## 2021-08-19 PROCEDURE — 87205 SMEAR GRAM STAIN: CPT

## 2021-08-19 PROCEDURE — 999N000063 XR ABDOMEN PORT 1 VIEWS

## 2021-08-19 PROCEDURE — 999N000065 XR CHEST PORT 1 VIEW

## 2021-08-19 PROCEDURE — P9041 ALBUMIN (HUMAN),5%, 50ML: HCPCS | Performed by: NURSE ANESTHETIST, CERTIFIED REGISTERED

## 2021-08-19 PROCEDURE — 272N000088 HC PUMP APP ADULT PERFUSION: Performed by: TRANSPLANT SURGERY

## 2021-08-19 PROCEDURE — 200N000002 HC R&B ICU UMMC

## 2021-08-19 PROCEDURE — 85610 PROTHROMBIN TIME: CPT

## 2021-08-19 PROCEDURE — 86923 COMPATIBILITY TEST ELECTRIC: CPT | Performed by: TRANSPLANT SURGERY

## 2021-08-19 PROCEDURE — 999N000155 HC STATISTIC RAPCV CVP MONITORING

## 2021-08-19 PROCEDURE — 87040 BLOOD CULTURE FOR BACTERIA: CPT

## 2021-08-19 PROCEDURE — 99221 1ST HOSP IP/OBS SF/LOW 40: CPT | Mod: 25 | Performed by: INTERNAL MEDICINE

## 2021-08-19 PROCEDURE — 999N000157 HC STATISTIC RCP TIME EA 10 MIN

## 2021-08-19 PROCEDURE — 71275 CT ANGIOGRAPHY CHEST: CPT

## 2021-08-19 PROCEDURE — 250N000025 HC SEVOFLURANE, PER MIN: Performed by: TRANSPLANT SURGERY

## 2021-08-19 PROCEDURE — P9041 ALBUMIN (HUMAN),5%, 50ML: HCPCS

## 2021-08-19 PROCEDURE — 94002 VENT MGMT INPAT INIT DAY: CPT

## 2021-08-19 PROCEDURE — 81001 URINALYSIS AUTO W/SCOPE: CPT | Performed by: TRANSPLANT SURGERY

## 2021-08-19 RX ORDER — NALOXONE HYDROCHLORIDE 0.4 MG/ML
0.2 INJECTION, SOLUTION INTRAMUSCULAR; INTRAVENOUS; SUBCUTANEOUS
Status: DISCONTINUED | OUTPATIENT
Start: 2021-08-19 | End: 2021-08-19 | Stop reason: HOSPADM

## 2021-08-19 RX ORDER — NALOXONE HYDROCHLORIDE 0.4 MG/ML
0.4 INJECTION, SOLUTION INTRAMUSCULAR; INTRAVENOUS; SUBCUTANEOUS
Status: DISCONTINUED | OUTPATIENT
Start: 2021-08-19 | End: 2021-09-09 | Stop reason: HOSPADM

## 2021-08-19 RX ORDER — PROPOFOL 10 MG/ML
5-75 INJECTION, EMULSION INTRAVENOUS CONTINUOUS
Status: DISCONTINUED | OUTPATIENT
Start: 2021-08-19 | End: 2021-08-23 | Stop reason: CLARIF

## 2021-08-19 RX ORDER — NICOTINE POLACRILEX 4 MG
15-30 LOZENGE BUCCAL
Status: DISCONTINUED | OUTPATIENT
Start: 2021-08-19 | End: 2021-08-22

## 2021-08-19 RX ORDER — PIPERACILLIN SODIUM, TAZOBACTAM SODIUM 4; .5 G/20ML; G/20ML
4.5 INJECTION, POWDER, LYOPHILIZED, FOR SOLUTION INTRAVENOUS EVERY 6 HOURS
Status: DISCONTINUED | OUTPATIENT
Start: 2021-08-19 | End: 2021-08-20

## 2021-08-19 RX ORDER — DIPHENHYDRAMINE HYDROCHLORIDE 50 MG/ML
INJECTION INTRAMUSCULAR; INTRAVENOUS PRN
Status: DISCONTINUED | OUTPATIENT
Start: 2021-08-19 | End: 2021-08-19

## 2021-08-19 RX ORDER — NOREPINEPHRINE BITARTRATE 0.06 MG/ML
.01-.6 INJECTION, SOLUTION INTRAVENOUS CONTINUOUS
Status: DISCONTINUED | OUTPATIENT
Start: 2021-08-19 | End: 2021-08-23 | Stop reason: CLARIF

## 2021-08-19 RX ORDER — IOPAMIDOL 755 MG/ML
52 INJECTION, SOLUTION INTRAVASCULAR ONCE
Status: COMPLETED | OUTPATIENT
Start: 2021-08-19 | End: 2021-08-19

## 2021-08-19 RX ORDER — PROPOFOL 10 MG/ML
INJECTION, EMULSION INTRAVENOUS PRN
Status: DISCONTINUED | OUTPATIENT
Start: 2021-08-19 | End: 2021-08-19

## 2021-08-19 RX ORDER — DEXTROSE MONOHYDRATE 25 G/50ML
25-50 INJECTION, SOLUTION INTRAVENOUS
Status: DISCONTINUED | OUTPATIENT
Start: 2021-08-19 | End: 2021-08-22

## 2021-08-19 RX ORDER — FENTANYL CITRATE 50 UG/ML
INJECTION, SOLUTION INTRAMUSCULAR; INTRAVENOUS PRN
Status: DISCONTINUED | OUTPATIENT
Start: 2021-08-19 | End: 2021-08-19

## 2021-08-19 RX ORDER — ALBUMIN HUMAN 5 %
INTRAVENOUS SOLUTION INTRAVENOUS PRN
Status: DISCONTINUED | OUTPATIENT
Start: 2021-08-19 | End: 2021-08-19

## 2021-08-19 RX ORDER — OXYMETAZOLINE HYDROCHLORIDE 0.05 G/100ML
SPRAY NASAL PRN
Status: DISCONTINUED | OUTPATIENT
Start: 2021-08-19 | End: 2021-08-19

## 2021-08-19 RX ORDER — NALOXONE HYDROCHLORIDE 0.4 MG/ML
0.2 INJECTION, SOLUTION INTRAMUSCULAR; INTRAVENOUS; SUBCUTANEOUS
Status: DISCONTINUED | OUTPATIENT
Start: 2021-08-19 | End: 2021-09-09 | Stop reason: HOSPADM

## 2021-08-19 RX ORDER — POTASSIUM CHLORIDE 29.8 MG/ML
20 INJECTION INTRAVENOUS
Status: COMPLETED | OUTPATIENT
Start: 2021-08-19 | End: 2021-08-19

## 2021-08-19 RX ORDER — ALBUMIN, HUMAN INJ 5% 5 %
50 SOLUTION INTRAVENOUS ONCE
Status: COMPLETED | OUTPATIENT
Start: 2021-08-19 | End: 2021-08-19

## 2021-08-19 RX ORDER — NALOXONE HYDROCHLORIDE 0.4 MG/ML
0.4 INJECTION, SOLUTION INTRAMUSCULAR; INTRAVENOUS; SUBCUTANEOUS
Status: DISCONTINUED | OUTPATIENT
Start: 2021-08-19 | End: 2021-08-19 | Stop reason: HOSPADM

## 2021-08-19 RX ORDER — PIPERACILLIN SODIUM, TAZOBACTAM SODIUM 3; .375 G/15ML; G/15ML
3.38 INJECTION, POWDER, LYOPHILIZED, FOR SOLUTION INTRAVENOUS ONCE
Status: COMPLETED | OUTPATIENT
Start: 2021-08-19 | End: 2021-08-19

## 2021-08-19 RX ORDER — LIDOCAINE HYDROCHLORIDE 20 MG/ML
INJECTION, SOLUTION INFILTRATION; PERINEURAL PRN
Status: DISCONTINUED | OUTPATIENT
Start: 2021-08-19 | End: 2021-08-19

## 2021-08-19 RX ORDER — MAGNESIUM SULFATE 1 G/100ML
INJECTION INTRAVENOUS PRN
Status: DISCONTINUED | OUTPATIENT
Start: 2021-08-19 | End: 2021-08-19

## 2021-08-19 RX ORDER — PROPOFOL 10 MG/ML
INJECTION, EMULSION INTRAVENOUS CONTINUOUS PRN
Status: DISCONTINUED | OUTPATIENT
Start: 2021-08-19 | End: 2021-08-19

## 2021-08-19 RX ORDER — SODIUM CHLORIDE, SODIUM GLUCONATE, SODIUM ACETATE, POTASSIUM CHLORIDE AND MAGNESIUM CHLORIDE 526; 502; 368; 37; 30 MG/100ML; MG/100ML; MG/100ML; MG/100ML; MG/100ML
INJECTION, SOLUTION INTRAVENOUS CONTINUOUS PRN
Status: DISCONTINUED | OUTPATIENT
Start: 2021-08-19 | End: 2021-08-19

## 2021-08-19 RX ADMIN — PROPOFOL 50 MCG/KG/MIN: 10 INJECTION, EMULSION INTRAVENOUS at 14:52

## 2021-08-19 RX ADMIN — EPINEPHRINE 0.06 MCG/KG/MIN: 1 INJECTION PARENTERAL at 11:48

## 2021-08-19 RX ADMIN — AMIODARONE HYDROCHLORIDE 1 MG/MIN: 50 INJECTION, SOLUTION INTRAVENOUS at 12:33

## 2021-08-19 RX ADMIN — MICAFUNGIN SODIUM 100 MG: 50 INJECTION, POWDER, LYOPHILIZED, FOR SOLUTION INTRAVENOUS at 23:53

## 2021-08-19 RX ADMIN — VASOPRESSIN 0.5 UNITS/HR: 20 INJECTION INTRAVENOUS at 11:21

## 2021-08-19 RX ADMIN — ALBUMIN HUMAN 50 G: 0.05 INJECTION, SOLUTION INTRAVENOUS at 21:45

## 2021-08-19 RX ADMIN — FENTANYL CITRATE 200 MCG: 50 INJECTION, SOLUTION INTRAMUSCULAR; INTRAVENOUS at 11:10

## 2021-08-19 RX ADMIN — AMIODARONE HYDROCHLORIDE 0.5 MG/MIN: 50 INJECTION, SOLUTION INTRAVENOUS at 23:28

## 2021-08-19 RX ADMIN — HYDROCORTISONE SODIUM SUCCINATE 100 MG: 100 INJECTION, POWDER, FOR SOLUTION INTRAMUSCULAR; INTRAVENOUS at 23:27

## 2021-08-19 RX ADMIN — LIDOCAINE HYDROCHLORIDE 25 MG: 20 INJECTION, SOLUTION INFILTRATION; PERINEURAL at 10:12

## 2021-08-19 RX ADMIN — SODIUM BICARBONATE 50 MEQ: 84 INJECTION, SOLUTION INTRAVENOUS at 11:20

## 2021-08-19 RX ADMIN — MIDAZOLAM 4 MG: 1 INJECTION INTRAMUSCULAR; INTRAVENOUS at 12:21

## 2021-08-19 RX ADMIN — FLUCONAZOLE 200 MG: 2 INJECTION INTRAVENOUS at 10:27

## 2021-08-19 RX ADMIN — AMIODARONE HYDROCHLORIDE 150 MG: 50 INJECTION, SOLUTION INTRAVENOUS at 11:59

## 2021-08-19 RX ADMIN — MAGNESIUM SULFATE HEPTAHYDRATE 1000 MG: 1 INJECTION, SOLUTION INTRAVENOUS at 12:05

## 2021-08-19 RX ADMIN — PROPOFOL 60 MCG/KG/MIN: 10 INJECTION, EMULSION INTRAVENOUS at 22:02

## 2021-08-19 RX ADMIN — NOREPINEPHRINE BITARTRATE 96 MCG: 1 INJECTION, SOLUTION, CONCENTRATE INTRAVENOUS at 12:21

## 2021-08-19 RX ADMIN — PROPOFOL 50 MCG/KG/MIN: 10 INJECTION, EMULSION INTRAVENOUS at 12:43

## 2021-08-19 RX ADMIN — SODIUM CHLORIDE, SODIUM GLUCONATE, SODIUM ACETATE, POTASSIUM CHLORIDE AND MAGNESIUM CHLORIDE: 526; 502; 368; 37; 30 INJECTION, SOLUTION INTRAVENOUS at 11:31

## 2021-08-19 RX ADMIN — SODIUM BICARBONATE 50 MEQ: 84 INJECTION, SOLUTION INTRAVENOUS at 12:21

## 2021-08-19 RX ADMIN — EPINEPHRINE 50 MCG: 1 INJECTION PARENTERAL at 12:17

## 2021-08-19 RX ADMIN — PIPERACILLIN SODIUM AND TAZOBACTAM SODIUM 4.5 G: 4; .5 INJECTION, POWDER, LYOPHILIZED, FOR SOLUTION INTRAVENOUS at 21:43

## 2021-08-19 RX ADMIN — ALBUMIN (HUMAN) 500 ML: 12.5 SOLUTION INTRAVENOUS at 11:20

## 2021-08-19 RX ADMIN — PROPOFOL 50 MCG/KG/MIN: 10 INJECTION, EMULSION INTRAVENOUS at 17:00

## 2021-08-19 RX ADMIN — MIDAZOLAM 2 MG: 1 INJECTION INTRAMUSCULAR; INTRAVENOUS at 10:20

## 2021-08-19 RX ADMIN — VANCOMYCIN HYDROCHLORIDE 1250 MG: 100 INJECTION, POWDER, LYOPHILIZED, FOR SOLUTION INTRAVENOUS at 06:01

## 2021-08-19 RX ADMIN — Medication 2 UNITS: at 12:21

## 2021-08-19 RX ADMIN — PIPERACILLIN SODIUM AND TAZOBACTAM SODIUM 3.38 G: 3; .375 INJECTION, POWDER, LYOPHILIZED, FOR SOLUTION INTRAVENOUS at 05:24

## 2021-08-19 RX ADMIN — PROPOFOL 100 MG: 10 INJECTION, EMULSION INTRAVENOUS at 10:12

## 2021-08-19 RX ADMIN — EPINEPHRINE 100 MCG: 1 INJECTION PARENTERAL at 12:21

## 2021-08-19 RX ADMIN — OXYMETAZOLINE HYDROCHLORIDE 2 SPRAY: 0.05 SPRAY NASAL at 10:18

## 2021-08-19 RX ADMIN — ROCURONIUM BROMIDE 60 MG: 10 INJECTION INTRAVENOUS at 10:12

## 2021-08-19 RX ADMIN — Medication 0.03 MCG/KG/MIN: at 11:13

## 2021-08-19 RX ADMIN — VASOPRESSIN 2.4 UNITS/HR: 20 INJECTION INTRAVENOUS at 20:26

## 2021-08-19 RX ADMIN — POTASSIUM CHLORIDE 20 MEQ: 29.8 INJECTION, SOLUTION INTRAVENOUS at 17:19

## 2021-08-19 RX ADMIN — Medication 50 MCG/HR: at 14:57

## 2021-08-19 RX ADMIN — VANCOMYCIN HYDROCHLORIDE 1500 MG: 100 INJECTION, POWDER, LYOPHILIZED, FOR SOLUTION INTRAVENOUS at 22:21

## 2021-08-19 RX ADMIN — PIPERACILLIN SODIUM AND TAZOBACTAM SODIUM 2.25 G: 2; .25 INJECTION, POWDER, LYOPHILIZED, FOR SOLUTION INTRAVENOUS at 10:34

## 2021-08-19 RX ADMIN — SUFENTANIL CITRATE 2 ML: 0.05 INJECTION EPIDURAL; INTRAVENOUS at 11:47

## 2021-08-19 RX ADMIN — POTASSIUM CHLORIDE 20 MEQ: 29.8 INJECTION, SOLUTION INTRAVENOUS at 16:27

## 2021-08-19 RX ADMIN — SODIUM CHLORIDE, SODIUM GLUCONATE, SODIUM ACETATE, POTASSIUM CHLORIDE AND MAGNESIUM CHLORIDE: 526; 502; 368; 37; 30 INJECTION, SOLUTION INTRAVENOUS at 10:05

## 2021-08-19 RX ADMIN — DIPHENHYDRAMINE HYDROCHLORIDE 50 MG: 50 INJECTION, SOLUTION INTRAMUSCULAR; INTRAVENOUS at 13:19

## 2021-08-19 RX ADMIN — IOPAMIDOL 52 ML: 755 INJECTION, SOLUTION INTRAVENOUS at 13:55

## 2021-08-19 RX ADMIN — LIDOCAINE HYDROCHLORIDE 100 MG: 20 INJECTION, SOLUTION INFILTRATION; PERINEURAL at 11:59

## 2021-08-19 RX ADMIN — SODIUM CHLORIDE, SODIUM GLUCONATE, SODIUM ACETATE, POTASSIUM CHLORIDE AND MAGNESIUM CHLORIDE: 526; 502; 368; 37; 30 INJECTION, SOLUTION INTRAVENOUS at 13:51

## 2021-08-19 ASSESSMENT — ACTIVITIES OF DAILY LIVING (ADL): ADLS_ACUITY_SCORE: 13

## 2021-08-19 NOTE — PLAN OF CARE
Neuro: A&Ox4.  Cardiac: SR. VSS.   Respiratory: Sating above 90% RA.  GI/: Adequate urine output. No BM noted during shift.  Diet/appetite: NPO diet for OR liver transplant  Activity:  Assist of Self up to chair and in halls.  Pain: At acceptable level on current regimen. Declined pain  Skin: No new deficits noted. Jaundiced and rounded abd   LDA's: Right 22 g PIV.    Patient transferred to OR at 0957  Patients belongings brought down to 4A      Plan: Continue with POC. Notify primary team with changes.

## 2021-08-19 NOTE — PROGRESS NOTES
Shift summary 1400 to 1930 today  Pt admitted to SICU after aborted liver transplant. Intubated and sedated on propofol.. Epi gtt initially weaned to off, now back on along with norepi and vasopressin. UOP adequate, drain output slowing. Abdominal incision with scant drainage. Sats well on 60% FiO2 . Anticipate cardiac MRI in am.

## 2021-08-19 NOTE — ANESTHESIA POSTPROCEDURE EVALUATION
Patient: Berta Nava    Procedure(s):  Opening of abdomen, Abdominal Exploration and aborted liver transplant.    Diagnosis:Cholangitis, sclerosing [K83.09]  Diagnosis Additional Information: No value filed.    Anesthesia Type:  General    Note:  Disposition: ICU            ICU Sign Out: Anesthesiologist/ICU physician sign out WAS performed   Postop Pain Control: Uneventful            Sign Out: Well controlled pain   PONV:    Neuro/Psych: Uneventful            Sign Out: Acceptable/Baseline neuro status   Airway/Respiratory: Uneventful            Sign Out: Acceptable/Baseline resp. status   CV/Hemodynamics:             Events: Arrhythmia; Refractory hypOtension   Other NRE:    DID A NON-ROUTINE EVENT OCCUR? YES    Event details/Postop Comments:  Please see anesthesia note about the complication. Patient had increase arrhythmia with PVCs and V-tach. Unsable, shock x 3-4 time. Unable initially, stable upon transport to the ICU. Patient brought to CT scan to r/o PE. Patient was still on epi 0.1, levophed 0.2 and vasopressin @ 3 units gtt. Reprot given to ICU attending.            Last vitals:  Vitals Value Taken Time   /69 08/19/21 1400   Temp     Pulse 86 08/19/21 1427   Resp     SpO2 97 % 08/19/21 1427   Vitals shown include unvalidated device data.    Electronically Signed By: Alfredo Justice DO  August 19, 2021  2:28 PM

## 2021-08-19 NOTE — ANESTHESIA PROCEDURE NOTES
Central Line/PA Catheter Placement  Pre-Procedure   Staff -        Anesthesiologist:  Alfredo Justice DO       Resident/Fellow: Steven Price DO       Performed By: resident       Location: OR       Time Interval: after induction       Pre-Anesthestic Checklist: patient identified, IV checked, site marked, risks and benefits discussed, informed consent, monitors and equipment checked, pre-op evaluation and at physician/surgeon's request  Timeout:       Correct Patient: Yes        Correct Procedure: Yes        Correct Site: Yes        Correct Position: Yes        Correct Laterality: Yes     Procedure   Procedure: central line       Laterality: right       Insertion Site: internal jugular.       Patient Position: Trendelenburg  Sterile Prep        All elements of maximal sterile barrier technique followed       Patient Prep/Sterile Barriers: draped, hand hygiene, gloves , hat , mask , draped, gown, sterile gel and probe cover       Skin prep: Chloraprep  Insertion/Injection        Technique: ultrasound guided        1. Ultrasound was used to evaluate the access site.       2. Vein evaluated via ultrasound for patency/adequacy.       3. Using real-time ultrasound the needle/catheter was observed entering the artery/vein.       Introducer Type: 9 Fr, 2-lumen MAC   Narrative         Secured by: suture       Tegaderm and Biopatch dressing used.       Complications: None apparent,        blood aspirated from all lumens,        All lumens flushed: Yes       Verification method: Ultrasound and Placement to be verified post-op

## 2021-08-19 NOTE — OP NOTE
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    POSTOPERATIVE DIAGNOSES:     1.  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  2.  Ventricular tachycardia, unresponsive to medical treatment.  3.  Hypotension on  3 pressors    PROCEDURES PERFORMED:    1.  Exploration of the abdomen.  2.  Aborted liver transplant.    SURGEON: Ernesto Schmitt MD      ASSISTANTS:    1.  Mary Justice MD, surgical fellow  2.  Robbin, medical student    INDICATION FOR PROCEDURE:  Berta Nava is a 55-year-old lady who was evaluated and approved for a liver transplant.  When a suitable level was available, she was called and I explained to them the risks, benefits and alternatives of liver transplantation.      DESCRIPTION OF PROCEDURE:  We brought the patient to the operating room and general anesthesia was administered and lines were placed.  We opened the abdomen by bilateral subcostal incision.  After entering the abdomen, we noted a lot of ascites; we evacuated the ascites.  We then divided the falciform ligament.  We divided the left triangular ligament.  At this point, the anesthesiologist told us the patient had developed ventricular tachycardia with severe hyportension un responsive to pressors.  The patient was shocked approximately 5-6 times.  After that, she continued to be hypotensive and was maxed out on 3 pressors.  At this point, we felt that proceeding with transplantation would be very high risk and almost result in intraoperative death.  I called the family.  We spoke to the cardiac anesthesiologist.  We spoke to our anesthesiologist, Dr.Vinyn Justice and I also spoke to Dr. Tom Leventhal, the patient's primary  hepatologist and we decided to abort the procedure.  Hemostasis was secured.  The abdomen was closed in a continuous layer with #1 Prolene.  A ROMA drain was placed.  The patient was then transferred to the ICU  in stable, but critical condition.  I explained the details of the procedure to the family and answered all of their questions.  The plan is to monitor her in SICU and obtain a full cardiology consult      Ernesto Schmitt MD        D: 2021   T: 2021   MT: [id number]    Name:     ESTUARDO JOYCE  MRN:      [id number]        Account:        [id number]   :      1965           Procedure Date: 2021     Document: [id number]

## 2021-08-19 NOTE — PROGRESS NOTES
Admission          8/18/2021  8:35 PM  -----------------------------------------------------------  Reason for admission: Liver transplant  Primary team notified of pt arrival.  Admitted from: home  Via: walking  Accompanied by: family  Belongings: Placed in closet; valuables sent home with family  Admission Profile: complete  Teaching: orientation to unit and call light- call light within reach, call don't fall, use of console, meal times, when to call for the RN, and enforced importance of safety   Access: PIV  Telemetry: not ordered  Ht./Wt.: complete  Code Status verified on armband: yes  2 RN Skin Assessment Completed By: Not done yet  Med Rec completed: yes  Bed surface reassessed with algorithm and charted: yes  New bed surface ordered: no  Suction/Ambu bag/Flowmeter at bedside: yes    Pt status: stable, nervous    Temp:  [98.2  F (36.8  C)] 98.2  F (36.8  C)  Pulse:  [] 102  Resp:  [16-18] 18  BP: (101-112)/(71-72) 112/72  SpO2:  [99 %-100 %] 100 %

## 2021-08-19 NOTE — PROGRESS NOTES
CLINICAL NUTRITION SERVICES - ASSESSMENT NOTE     Nutrition Prescription    RECOMMENDATIONS FOR MDs/PROVIDERS TO ORDER:  Recommendations for enteral nutrition below    Malnutrition Status:    Unable to determine due to unable to visit w/ pt at this time    Recommendations already ordered by Registered Dietitian (RD):  None at this time- pt NPO    Future/Additional Recommendations:  If EN is necessary post-op recommend:  Osmolite 1.5 Chris @ goal of  55ml/hr  (1320ml/day)  will provide: 1980 kcals (33 kcal/kg), 83 g PRO (1.4 g/kg), 1005 ml free H20, 268 g CHO, and 0 g fiber daily. + 2 pkts ProSource per day. Total provisions: 2060 kcal (34 kcal/kg) and 105 g PRO (1.8 g/kg).  - Initiate @ 15 ml/hr and advance by 10 ml q8hr pending pt's tolerance  - Do not start or advance TF rate unless Mg++ >1.5, K+ is >3, and phos >1.9  - Recommend 30 ml q4hr fluid flushes for tube patency. Additional fluids and/or adjustments per MD.    - Order multivitamin/mineral (15 ml/day via FT) to help ensure micronutrient needs being met with suspected hypermetabolic demands and potential interruptions to TF infusions.    If fluid restricted formula required recommend: Goal TwoCal HN @ 40 mL/hr (960 mL/day) to provide 1920 kcals (32 kcal/kg/day), 81 g PRO (1.4 g/kg/day), 672 mL H2O, 210 g CHO and 5 g Fiber daily. + 2 pkts ProSource per day. Total provisions: 2000 kcal (33 kcal/kg) and 103 g PRO (1.7 g/kg).        REASON FOR ASSESSMENT  Berta Nava is a/an 55 year old female assessed by the dietitian for Provider Order - Liver Transplant Pre Op    NUTRITION HISTORY  Pt in OR for liver transplant, unable to obtain nutrition history from pt.   Pt was last seen by RD on 5/11/21. At that time she had a fair-good appetite with some early satiety and was drinking Ensure Enlive. Pt was typically eating a low sodium, high protein diet.     CURRENT NUTRITION ORDERS  Diet: NPO  Intake/Tolerance: NPO since admit    LABS  Labs reviewed  Na 126  "(L)  Lytes (WNL)  Bili total 3.6 (H)  Alk Phos 400 (H)  CRP 40 (H)    MEDICATIONS  Medications reviewed    ANTHROPOMETRICS  Height: 5' 5\"  Most Recent Weight: 59.5 kg (131 lb 2.8 oz)    IBW: 56.8 kg (105%)  BMI: Normal BMI  Weight History: Weight loss of 8.9 kg (13%) over the past ~2 months. Question accuracy of weight from 5/10.   Wt Readings from Last 10 Encounters:   08/18/21 59.5 kg (131 lb 2.8 oz)   08/16/21 62.6 kg (138 lb)   07/22/21 59.4 kg (131 lb)   06/25/21 61.1 kg (134 lb 11.2 oz)   06/21/21 65.5 kg (144 lb 8 oz)   06/12/21 68.4 kg (150 lb 14.4 oz)   05/10/21 53.7 kg (118 lb 6.4 oz)   05/03/21 76.7 kg (169 lb 3.2 oz)   02/22/21 72.6 kg (160 lb)   05/17/18 74.6 kg (164 lb 6.4 oz)     Dosing Weight: 60 kg (admit wt)    ASSESSED NUTRITION NEEDS  Estimated Energy Needs: 9254-8149 kcals/day (30 - 35 kcals/kg )  Justification: Increased needs post-SOT   Estimated Protein Needs:  grams protein/day (1.5 - 2 grams of pro/kg)  Justification: Increased needs post-SOT   Estimated Fluid Needs: 1 mL/kcal  Justification: Maintenance    PHYSICAL FINDINGS  See malnutrition section below.    MALNUTRITION  % Intake: Unable to assess  % Weight Loss: > 7.5% in 3 months (severe)  Subcutaneous Fat Loss: Unable to assess  Muscle Loss: Unable to assess  Fluid Accumulation/Edema: Unable to assess  Malnutrition Diagnosis: Unable to determine due to unable to visit w/ pt at this time    NUTRITION DIAGNOSIS  Inadequate protein-energy intake related to NPO status as evidenced by meeting 0% nutrition needs currently      INTERVENTIONS  Implementation  Nutrition Education: Unable to complete due to unable to speak w/ pt at this time   Enteral Nutrition: Recommendations above     Goals  Diet adv v nutrition support within 2-3 days.     Monitoring/Evaluation  Progress toward goals will be monitored and evaluated per protocol.    Radha Lerner, RD, LD  6B RD pager [phone]        "

## 2021-08-19 NOTE — ANESTHESIA PROCEDURE NOTES
Perioperative PALMA Procedure Note    Staff -        Anesthesiologist:  Alfredo Justice DO       Performed By: anesthesiologist  Preanesthesia Checklist:  Patient identified, IV assessed, risks and benefits discussed, monitors and equipment assessed, procedure being performed at surgeon's request and anesthesia consent obtained.    PALMA Probe Insertion    Probe Status PRE Insertion: NO obvious damage  Probe type:  Adult 2D  Bite block used:   Soft  Insertion Technique: Easy, no oropharyngeal manipulation  Insertion complications: None obvious  Billing Report:PALMA report by Anesthesiologist (See Separate Report note)  Probe Status POST Removal: NO obvious damage

## 2021-08-19 NOTE — TELEPHONE ENCOUNTER
"TRANSPLANT OR REPORT    Organ: Liver  Laterality (if known): N/A  Organ Location: Local    UNOS ID: [id number]  Donor OR Time: 0200  Expected/Actual Cross Clamp Time: 0400  Expected Organ Arrival Time: 0600    Surgeon: Oskar  Time in OR: 0600  Time in 3C (N/A for LI): N/A    Recipient Details  Admission ETA: 2030  Unit: 6B  Isolation: No  Latex Allergy: No  : No  Diagnosis: Primary Sclerosing Cholangitis    Liver Transplants  Bypass: Yes  Hemodialysis: No  ~ \"RENAL STAFF TEACHING SERVICE MEDICINE\" : N/A  ~ CRRT Resource Nurse: N/A  (Telephone Number for CRRT [phone])    Kidney/Panc Transplants  XM Status (Need to wait for XM?): N/A    Liver or KP/PA Recipients - Vessel Banking:  Donor has positive serologies for HIV/HCV/HBV: No  Donor has risk criteria for HIV/HCV/HBV: No      Transplant Coordinator Contact Info: Myriam [id number]      Vessel Bank Information  Transplant hospitals must not store a donor s extra vessels if the donor has tested positive for any of the following:   - HIV by antibody, antigen, or nucleic acid test (VILMA)   - Hepatitis B surface antigen (HBsAg)   - Hepatitis B (HBV) by VILMA   - Hepatitis C (HCV) by antibody or VILMA     Extra vessels from donors that do not test positive for HIV, HBV, or HCV as above may be stored      "

## 2021-08-19 NOTE — H&P
"SURGICAL ICU ADMISSION NOTE  8/19/2021    Critical Care Services Progress Note:     Berta Nava remains critically ill with aborted liver transplantation due to episodes of ventricular tachycardia requiring DC countershock.  She also has a history of sclerosing cholangitis.  She came out of the operating room requiring vasopressin, epinephrine and norepinephrine.  She remains on mechanical ventilation support.   I personally examined and evaluated the patient today.   The patient s prognosis today is guarded until we know more about the etiology of her cardiovascular instability in the operating room.  I have evaluated all laboratory values and imaging studies from the past 24 hours.  Key findings and decisions made today included careful assessment of cardiac function using testing as described below due to cardiovascular instability in the operating room during the starting phase of transplantation.  I personally managed the mechanical ventilation, analgesia and sedation.  Consults ongoing and ordered are Cardiology, Nutrition and Pharmacy.  Procedures that will happen today are Cardiology assessment likely to include EKG, troponins and echocardiography.  All treatments were placed at my direction.  I formulated today s plan with Dr. Brown and the house staff team or resident(s) and agree with the findings and plan in the associated note.   I was also present with the medical student who participated in the service and in the documentation of the note. I have verified the history and personally performed the physical exam and medical decision making. I agree with the assessment and plan of care as documented in the note.\"   The above plans and care have also been discussed with family at the bedside and all questions and concerns were addressed.   I spent a total of 35 minutes (excluding procedure time) personally providing and directing critical care services at the bedside and on the critical care unit for " Berta Nava.          Henry Cuba MD          PRIMARY TEAM: Transplant  PRIMARY PHYSICIAN: Dr. Pond    REASON FOR CRITICAL CARE ADMISSION: Intraoperative V-tach and unstable hemodynamics with concern for PE  ADMITTING PHYSICIAN: Dr. Cuba    ASSESSMENT:   Berta Nava is a 55 year old female with 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 She presented in stable condition from home for potential liver transplant. Patient was taken to the OR. Incision was made down to the fascia and patient went into v-tach requiring 3 rounds of shock. Consideration for starting ECMO, but patient converted out. Patient incisions closed. Taken to CT for concern for PE. CT showed no signs of PE. Patient continues on 3 pressors post op. Comes to SICU  due to unstable hemodynamics.    PLAN:     Neuro/Pain/Sedation:  #Post operative sedation and pain control   RASS at -5   -Sedation: 50 Propofol   -Pain: 50 Fentanyl   -Monitor neurological status. Notify the MD/DO for any acute changes in exam.    Pulmonary:  #Post operative respiratory failure   #Intubated and mechanically ventilated   -Mechanically Ventilated AC 12/400/5/60%   -AB.18/88/50/19  -Repeat ABG      Cardiovascular:  #Intraoperative V tach requiring 4 rounds of defibrillation   -Patient had wnl stress ECHO pre-op with EF 60-65%  -Intraoperative v-tach requiring multiple rounds of defibrillation, labile BP and on 3 pressors    NE: 0.1    Epi: 0.1 wean off    Vaso: 3 down to 2.4   -Stat TTE post op : Global and regional left ventricular function is normal with an EF of 60-65%. Global right ventricular function is normal. No Effusion  -post VT EKG to be done   -Replete potassium and magnesium   -Continue amiodarone 1 mg/ml  -Obtain cardiac MRI  -EP consult evaluate after cMRI  -MAC with [place]-pulled back during V tach-tentative replacement     GI/Nutrition:   #  Hepatic Cirrhosis Secondary to PSC/UC  -NPO  -Abdominal x-ray to confirm NG   -Incision C/D/I  -ROMA drain x1     Renal/Fluids/Electrolytes:  #Hyponatremia  #Hypokalemia   -IVF running at 100 ml/h for fluid hydration  -Hyponatremia and Hypokalemia-ICU Electrolyte replacement protocol    Endocrine:  -B   -Sliding scale/insulin      ID/Antibiotics:  Afebrile-T max 98.4  -WBC 29.2    Heme:   #Acute blood loss anemia    -EBL= less than 100 ml    -Transfusion: 2 units intraoperative   -Goals: Hb > 8, Fibrinogen >200, INR < 2, plts > 50   -Hb 11.1  -Continue to trend Hb   -plts 422  -INR 1.61      Prophylaxis:  -DVT: mechanical SCD     MSK:  -PT and OT to be consulted once patient is less sedated     Lines/ tubes/ drains:  Peripheral IV x3, arterial line, RIJ single lumen, NG, ferguson, JPx1    Disposition:  -Surgical ICU     Patient seen, findings and plan discussed with surgical ICU staff, Dr. Cuba.      Howie Amador, MS4    Kevin Magallanes MD  Orthopaedic Surgery PGY1  - - - - - - - - - - - - - - - - - - - - - - - - - - - - - - - - - - - - - - - - - - - - - - - - - - - - - - - - - - - - - - - - - - - - - - - -     HISTORY PRESENTING ILLNESS: Berta Nava is a 55 year old female with 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 She feels in her normal state of health. Has not had any recent changes to her medical history or medications nor any recent illnesses. She gets weekly paracentesis, last today  with 4.8L of straw colored fluid drained. She had an ERCP 1 month ago with intrahepatic stents placed. Liver transplant candidate. Today, , liver transplant was aborted due v-tach and hemodynamic collapse with concerns for PE.     REVIEW OF SYSTEMS: 10 point ROS neg other than the symptoms noted above in the HPI.    PAST MEDICAL HISTORY:    has a past medical history of Ascites, Biliary cirrhosis (H),  Cholangitis, sclerosing, Cirrhosis of liver with ascites (H) (3/3/2021), Hearing loss of left ear, History of low potassium, Hyperlipidemia, Hypertension, SBP (spontaneous bacterial peritonitis) (H) (4/30/2021), Sjogren's syndrome (H), Ulcerative colitis (H), and Ulcerative pancolitis (H). She also has no past medical history of Diabetes (H) or History of blood transfusion.    SURGICAL HISTORY:    has a past surgical history that includes GYN surgery; Endoscopic Retrograde Cholangiopancreatography, Exchange Tube/Stent (N/A, 05/05/2021); PICC/Midline Placement (Right, 05/08/2021); Endoscopic Retrograde Cholangiopancreatography, Exchange Tube/Stent (N/A, 5/10/2021); Endoscopic Retrograde Cholangiopancreatography, Exchange Tube/Stent (N/A, 6/10/2021); Endoscopic retrograde cholangiopancreatogram (N/A, 6/25/2021); and Endoscopic retrograde cholangiopancreatogram (N/A, 7/22/2021).    SOCIAL HISTORY:    reports that she has never smoked. She has never used smokeless tobacco. She reports that she does not drink alcohol and does not use drugs.    FAMILY HISTORY: No bleeding/clotting disorders nor problems with anesthesia    ALLERGIES:      Allergies   Allergen Reactions     Diagnostic X-Ray Materials Hives     PATIENT HAD HIVE REACTION AFTER ADMINISTERING CT CONTRAST DYE.       Contrast Dye        MEDICATIONS:  [COMPLETED] lidocaine 1 % 10 mL    calcium carbonate 600 mg-vitamin D 400 units (CALTRATE) 600-400 MG-UNIT per tablet, Take 1 tablet by mouth every evening   furosemide (LASIX) 20 MG tablet, Take 2 tablets (40 mg) by mouth daily  mesalamine (LIALDA) 1.2 g EC tablet, Take 2 tablets (2,400 mg) by mouth daily (with breakfast)  multivitamin (CENTRUM SILVER) tablet, Take 1 tablet by mouth daily  potassium chloride ER (MICRO-K) 10 MEQ CR capsule, Take 10 mEq by mouth At Bedtime  simvastatin (ZOCOR) 20 MG tablet, Take 20 mg by mouth At Bedtime  spironolactone (ALDACTONE) 50 MG tablet, Take 2 tablets (100 mg) by mouth  daily  ursodiol (ACTIGALL) 300 MG capsule, Take 1 capsule (300 mg) by mouth 2 times daily      PHYSICAL EXAMINATION:  Temp:  [98  F (36.7  C)-98.4  F (36.9  C)] 98.4  F (36.9  C)  Pulse:  [] 94  Resp:  [16-18] 16  BP: (101-112)/(69-73) 104/69  SpO2:  [98 %-100 %] 98 %  General: RASS -5, heavily sedated   CV: RRR, on pressors    PULMONARY: Intubated and mechanically ventilated, symmetric chest rise   GI: NPO, NG in place   : [place] in place     Arterial Blood Gases   Recent Labs   Lab 08/19/21 1222 08/19/21 1213 08/19/21  1154 08/19/21  1104   PH 7.27* 7.32* 7.34* 7.27*   PCO2 36 36 36 38   PO2 183* 71* 70* 98   HCO3 17* 18* 19* 18*     Complete Blood Count   Recent Labs   Lab 08/19/21  1222 08/19/21 1213 08/19/21  1154 08/19/21  1104 08/18/21 2132 08/16/21  1008   WBC  --   --   --   --  15.1* 14.3*   HGB 8.9* 8.0* 9.0* 9.9* 9.8* 10.8*   PLT  --   --   --   --  399 468*     Basic Metabolic Panel  Recent Labs   Lab 08/19/21  1222 08/19/21  1213 08/19/21  1154 08/19/21  1104 08/18/21 2132 08/16/21  1008   * 129* 129* 127* 126* 126*   POTASSIUM 3.9 3.8 3.8 3.8 3.9 4.3   CHLORIDE  --   --   --   --  98 97   CO2  --   --   --   --  20 20   BUN  --   --   --   --  28 27   CR  --   --   --   --  0.65 0.74   * 94 89 92 100* 112*     Liver Function Tests  Recent Labs   Lab 08/18/21 2132 08/16/21  1008   AST 66* 73*   ALT 36 39   ALKPHOS 400* 388*   BILITOTAL 3.6* 3.6*   ALBUMIN 2.0* 2.6*   INR 1.45*  --      Pancreatic Enzymes  Recent Labs   Lab 08/18/21 2132   AMYLASE 68     Coagulation Profile  Recent Labs   Lab 08/18/21 2132   INR 1.45*   PTT 30     Lactate  Invalid input(s): LACTATE    IMAGING:  Recent Results (from the past 24 hour(s))   US Paracentesis    Narrative    US PARACENTESIS 8/18/2021 3:24 PM     HISTORY: Cirrhosis of liver with ascites, unspecified hepatic  cirrhosis type (H); Cirrhosis of liver with ascites, unspecified  hepatic cirrhosis type (H)    FINDINGS: Limited preprocedure  ultrasound was performed, images show a  sufficient amount of ascites for paracentesis. An image was archived.  Written and oral informed consent was obtained. A pause for the cause  procedure to verify the correct patient and correct procedure. The  skin overlying the right lower quadrant was prepped and draped in the  usual sterile fashion. The subcutaneous tissues were anesthetized with  1% lidocaine. Under direct ultrasound guidance the catheter was  advanced into the peritoneal space and 4.8 L of  straw colored fluid  was drained. The catheter was removed and a sterile dressing was  applied. There were no immediate complications. Ultrasound images were  permanently stored.  Patient left the ultrasound suite in satisfactory  condition.      Impression    IMPRESSION: Technically successful paracentesis without immediate  complications.    ANABEL NI DO         SYSTEM ID:  [id number]   XR Chest 2 Views    Narrative    EXAM: XR CHEST 2 VW  8/18/2021 10:16 PM     HISTORY:  pre op liver transplant       COMPARISON:  6/10/2021    FINDINGS:   PA and lateral radiograph the chest. Trachea is midline. Cardiac  silhouette is within normal limits. No airspace opacities. No pleural  effusion or pneumothorax. Osseous structures are within normal limits.  Multiple biliary stents project over the right upper quadrant.      Impression    IMPRESSION:   No acute cardiopulmonary findings.    I have personally reviewed the examination and initial interpretation  and I agree with the findings.    ELIZABTEH LAMB MD         SYSTEM ID:  [id number]   US Abdomen Limited w Abd/Pelvis Duplex Complete    Narrative    EXAMINATION: US ABDOMEN LIMITED WITH DOPPLER COMPLETE 8/19/2021 1:53  AM     COMPARISON: Abdomen and pelvis CT 6/10/2021, ultrasound 6/9/2021,  3/23/2021    HISTORY: Pre-op liver transplant patient, need to assess liver with  Doppler, concern for portal vein thrombosis    TECHNIQUE: The abdomen was scanned in standard  fashion with  specialized ultrasound transducer(s) using both gray-scale, color  Doppler, and spectral flow techniques.    Findings:  Liver: Coarsened echotexture and nodular contour. Measures 12.1 cm.    Extrahepatic portal vein flow is retrograde at 9 cm/s.  Right portal vein flow is retrograde, measuring 10 cm/s.  Left portal vein flow is retrograde, measuring 16 cm/s.  Patent umbilical vein.    Flow in the hepatic artery is towards the liver and:  54 cm/s peak systolic  0.76 resistive index.     Splenic vein is retrograde. The left, middle, and right hepatic veins  are patent with flow towards the IVC. The IVC is patent with flow  towards the heart.   The visualized aorta is not dilated.    Gallbladder: Gallbladder sludge with significant wall thickening,  likely secondary to volume status.    Bile Ducts: Both the intra- and extrahepatic biliary system are of  normal caliber.  The common bile duct measures 3 mm.    Pancreas: Visualized portions of the head and body of the pancreas are  unremarkable.     Right kidney:  Right kidney with normal echotexture without  hydronephrosis or mass. Measures 12 cm.    Fluid: Small to moderate volume ascites      Impression    Impression:   1. Cirrhotic liver with evidence of portal hypertension with  retrograde flow in the portal veins and splenic vein, patent umbilical  vein, and small to moderate volume ascites. No evidence of portal vein  thrombosis.  2. Gallbladder sludge with wall thickening, which is likely secondary  to cirrhosis/volume status.    I have personally reviewed the examination and initial interpretation  and I agree with the findings.    ELIZABETH LAMB MD         SYSTEM ID:  [id number]

## 2021-08-19 NOTE — PHARMACY-VANCOMYCIN DOSING SERVICE
"Pharmacy Vancomycin Initial Note  Date of Service 2021  Patient's  1965  55 year old, female    Indication: Unknown leukocytosis in preoperative transplant patient with hx of PSB complicated by recurrent cholangitis    Current estimated CrCl = Estimated Creatinine Clearance: 89.7 mL/min (based on SCr of 0.65 mg/dL).    Creatinine for last 3 days  2021: 10:08 AM Creatinine 0.74 mg/dL  2021:  9:32 PM Creatinine 0.65 mg/dL    Recent Vancomycin Level(s) for last 3 days  No results found for requested labs within last 72 hours.      Vancomycin IV Administrations (past 72 hours)      No vancomycin orders with administrations in past 72 hours.                Nephrotoxins and other renal medications (From now, onward)    Start     Dose/Rate Route Frequency Ordered Stop    21 0530  piperacillin-tazobactam (ZOSYN) 3.375 g vial to attach to  mL bag     Note to Pharmacy: For [facility], [facility] and [facility]: For Zosyn-naive patients, use the \"Zosyn initial dose + extended infusion\" order panel.    3.375 g  over 30 Minutes Intravenous ONCE 21 0443      21 0530  vancomycin 1250 mg in 0.9% NaCl 250 mL intermittent infusion 1,250 mg      1,250 mg  over 90 Minutes Intravenous ONCE 21 0517      21 2100  piperacillin-tazobactam (ZOSYN) 3.375 g vial to attach to  mL bag     Note to Pharmacy: For [facility], [facility] and [facility]: For Zosyn-naive patients, use the \"Zosyn initial dose + extended infusion\" order panel.    3.375 g  over 30 Minutes Intravenous ONCE 21 2100  piperacillin-tazobactam (ZOSYN) 2.25 g vial to attach to  ml bag     Note to Pharmacy: For [facility], [facility] and [facility]: For Zosyn-naive patients, use the \"Zosyn initial dose + extended infusion\" order panel.    2.25 g  over 30 Minutes Intravenous EVERY 2 HOURS PRN 21            Contrast Orders - past 72 hours (72h ago, onward)    None              Plan:  1. Start vancomycin  1250 mg IV for single dose " prior to planned transplant around 1000 on 8/19/21.  Treatment team to re-evaluate further need for vancomycin/antibiotics post-transplant.   2. Vancomycin monitoring method: Trough (Method 2 = manual dose calculation)  3. No drug monitoring plan required for single dose.    Aguila Lim, [facility]

## 2021-08-19 NOTE — PROGRESS NOTES
Patient transferred down to OR.  Off unit.  Belongings kept on unit until ICU bed placement available        present at bedside during transfer.

## 2021-08-19 NOTE — CONSULTS
Cardiology Fellow Brief Consult Note    HPI  55 year old female with hx of PSC/UC causing hepatic cirrhosis complicated by ascites, biliary strictures and choledocholithiasis, recurrent cholangitis who during liver transplantation went into unstable VT requiring cardioversion.    Physical Exam  Sedated and intubate  CVP 12  RRR  Abdomen soft nt  WWP no edema    Data  Labs/Imging Reviewed    Stat TTE 8/19  Interpretation Summary  Global and regional left ventricular function is normal with an EF of 60-65%.  Global right ventricular function is normal.  No pericardial effusion is present.  There has been no change.    A/P  55 year old female with hx of PSC/UC causing hepatic cirrhosis complicated by ascites, biliary strictures and choledocholithiasis, recurrent cholangitis who during liver transplantation went into unstable VT requiring cardioversion.    Recommendations  -Please obtain post VT EKG  -Please replete potassium and magnesium  -Remain on amiodarone ggt   -EP will continue to follow    Lino Marques MD  Fellow Physician PGY-4  Division of Cardiovascular Disease  After Hours Consult Pager [phone]    Addendum:  I saw and evaluated the patient and agree with the fellow s finding and plans as written.  Namrata Nobles MD

## 2021-08-19 NOTE — BRIEF OP NOTE
[facility]    Brief Operative Note    Pre-operative diagnosis: Cholangitis, sclerosing [K83.09]  Post-operative diagnosis Same as pre-operative diagnosis    Procedure: Procedure(s):  Opening of abdomen, Abdominal Exploration and aborted liver transplant.  Surgeon: Surgeon(s) and Role:     * Ernesto Schmitt MD - Primary  Anesthesia: General   Estimated blood loss: Less than 100 ml  Drains:  1 ROMA drain   Specimens:   ID Type Source Tests Collected by Time Destination   A : Ascites  Peritoneal Fluid Abdomen ANAEROBIC BACTERIAL CULTURE ROUTINE, GRAM STAIN, FUNGAL OR YEAST CULTURE ROUTINE, AEROBIC BACTERIAL CULTURE ROUTINE Ernesto Schmitt MD 8/19/2021 11:14 AM      Findings:   Cirrhotic liver; pt went into V-tach with unstable hemodynamics. Pt had to be on three pressors. Due to unstable hemodynamics and high risks of intra-op cardiac problems and graft perfusion, a decision was made between Dr. Schmitt and Anesthesia to abort transplant surgery.   Complications: Pt developed V-tach with unstable hemodynamics. Pt was too unstable to undergo transplant surgery.   Implants: * No implants in log *

## 2021-08-19 NOTE — ANESTHESIA PROCEDURE NOTES
Central Line/PA Catheter Placement  Pre-Procedure   Staff -        Anesthesiologist:  Alfredo Justice DO       Resident/Fellow: Steven Price DO       Performed By: resident       Location: OR       Time Interval: after induction       Pre-Anesthestic Checklist: patient identified, IV checked, site marked, risks and benefits discussed, informed consent, monitors and equipment checked, pre-op evaluation and at physician/surgeon's request  Timeout:       Correct Patient: Yes        Correct Procedure: Yes        Correct Site: Yes        Correct Position: Yes        Correct Laterality: Yes     Procedure   Procedure: central line       Laterality: right       Insertion Site: internal jugular.       Patient Position: Trendelenburg  Sterile Prep        All elements of maximal sterile barrier technique followed       Patient Prep/Sterile Barriers: draped, hand hygiene, gloves , hat , mask , draped, gown, sterile gel and probe cover       Skin prep: Chloraprep  Insertion/Injection        Technique: ultrasound guided        1. Ultrasound was used to evaluate the access site.       2. Vein evaluated via ultrasound for patency/adequacy.       3. Using real-time ultrasound the needle/catheter was observed entering the artery/vein.       Introducer Type: 9 Fr, 2-lumen MAC        PA Catheter Type: CCO         Appropriate RV, RA and PA waveforms noted:  Yes            Withdrawn and Locked at cm: 38  Narrative         Secured by: suture       Tegaderm and Biopatch dressing used.       Complications: None apparent,        blood aspirated from all lumens,        All lumens flushed: Yes       Verification method: Ultrasound and Placement to be verified post-op

## 2021-08-19 NOTE — UTILIZATION REVIEW
Admission Status; Secondary Review Determination     Admission Date: 8/18/2021  8:35 PM       Under the authority of the Utilization Management Committee, the utilization review process indicated a secondary review on the above patient.  The review outcome is based on review of the medical records, discussions with staff, and applying clinical experience noted on the date of the review.        (x)      Inpatient Status Appropriate - This patient's medical care is consistent with medical management for inpatient care and reasonable inpatient medical practice.       RATIONALE FOR DETERMINATION      Brief clinical presentation, information copied from the chart, abbreviated and edited for relevant content:     Paged team to advance to IP. Complications during planned surgery.     Patient is a 55 year old female with hx of PSC/UC causing hepatic cirrhosis complicated by ascites, biliary strictures and choledocholithiasis, recurrent cholangitis who during liver transplantation went into unstable VT requiring cardioversion.   Cardiology now recommending post VT EKG, replete potassium and magnesium, Remain on amiodarone ggt , Please obtain cMRI, and EP to evaluate after cMRI.       Currently, more than 2 nights hospital complex care was anticipated. Also, there was a risk of adverse outcome if patient was treated outpatient or observation. High intensity of services anticipated. Inpatient admission appropriate based on Medicare guidelines.       The information on this document is developed by the utilization review team in order for the business office to ensure compliance.  This only denotes the appropriateness of proper admission status and does not reflect the quality of care rendered.         The definitions of Inpatient Status and Observation Status used in making the determination above are those provided in the CMS Coverage Manual, Chapter 1 and Chapter 6, section 70.4.      Sincerely,      Aniya Coles MD    Utilization Review/ Case Management  [facility].

## 2021-08-19 NOTE — PROGRESS NOTES
Neuro: A&Ox4.   Cardiac:  VSS. No tele ordered  Respiratory: Sating 100 on RA.  GI/: Adequate urine output.   Diet/appetite: NPO  Activity:  Up ab indy  Pain: no complaints of pain  Skin: No new deficits noted.  LDA's: PIV x1    Plan: Continue with POC. Notify primary team with changes.    OR time 0945 for  donor liver transplant. One shower done for surgery.

## 2021-08-19 NOTE — ANESTHESIA CARE TRANSFER NOTE
Patient: Berta Nava    Procedure(s):  Opening of abdomen, Abdominal Exploration and aborted liver transplant.    Diagnosis: Cholangitis, sclerosing [K83.09]  Diagnosis Additional Information: No value filed.    Anesthesia Type:   General     Note:    Oropharynx: endotracheal tube in place  Level of Consciousness: unresponsive      Independent Airway: airway patency not satisfactory and stable  Dentition: dentition changed  Vital Signs Stable: post-procedure vital signs reviewed and stable  Report to RN Given: handoff report given  Patient transferred to: ICU  Comments: Pt intubated and ventilated with 100% O2.  VSS on vasoactive gtts.  Report given to oncoming RN.  Transfer of care occurred.  ICU Handoff: Call for PAUSE to initiate/utilize ICU HANDOFF, Identified Patient, Identified Responsible Provider, Reviewed the Pertinent Medical History, Discussed Surgical Course, Reviewed Intra-OP Anesthesia Management and Issues during Anesthesia, Set Expectations for Post Procedure Period and Allowed Opportunity for Questions and Acknowledgement of Understanding      Vitals:  Vitals Value Taken Time   /69 08/19/21 1400   Temp     Pulse 87 08/19/21 1413   Resp     SpO2 96 % 08/19/21 1413   Vitals shown include unvalidated device data.    Electronically Signed By: CHELSEY Lemus CRNA  August 19, 2021  2:14 PM

## 2021-08-19 NOTE — TELEPHONE ENCOUNTER
Organ Offer Encounter Information    Organ Offer Information  Organ offer date & time: 8/18/2021  5:08 PM  Coordinator/Fellow/Attending name: Marce Worrell RN   Organ(s):  Organ UNOS ID Match Run ID Comment Organ Laterality   Liver [id number]       Recent infections?: No    New medications?: No Recent pregnancy?: No   Angicoagulation medications?: No Recent vaccinations?: No   Recent blood transfusions?: No Recent hospitalizations?: Yes (Comment: ERCP)   Has your insurance changed in the last 6-12 months?: Neg    Discussed organ offer with: Patient  Patient/Caregiver name: Berta  Discussed risk category with Patient/Other: N/A  Understood donor criteria, verbalized understanding  Patient/Other asked to speak to a surgeon?: No  Discussed program-specific outcomes: Asked questions regarding SRTR, verbalized understanding  Right to decline organ offer without penalty, Patient/Other: Aware of option to decline without penalty  Organ offer decision status Patient/Other: Accepted Offer  Organ disposition: Case Cancelled - Recipient medical condition  Additional Comments: 8/18/2021 5:39 PM  Liver:  Primary  MD:  Dr. Schmitt  OPO Contact:  Sue [facility]  Donor/Recip HCV Status:  Recipient not in Epic/Donor negative  (HCV+ Donors - Discuss HCV genotyping/quant testing with MD & send message to [facility] HCV POOL - Include Donor UNOS ID)  Donor Nutritional Status:   Plan (NPO, Donor OR): Donor OR scheduled for 0200. Dr. Johnson and Dr. Morales to recover. Plan for Recipient OR at 0600 (cell-saver, perfusion on-call in house, no CRRT)    - - -   COVID Screening  In the past month, have you had:  Any close contact with a suspect or laboratory-confirmed COVID-19 patient: No  Travel anywhere:  No  COVID Symptoms (Fever, Cough, Short of Breath, Loss of Taste/Smell, Rash):  No  - - -   Admissions: anita Alan 1802 (contact info given for Myriam, will be on-call after 1900)  Unit: 1800 Olya, charge  7A; no beds, plus 1 boarding in PACU  Immunology:  L[facility] 1845  Dr. Schmitt request D50 to be pushed now and then q 6 until OR. Insulin gtt can be changed based on need. Sue at [facility] verbalizes request.  Electronically filed by Marce Worrell RN  8/18/2021  6:35 PM      August 18, 2021 7:00 PM  Per Favian at admissions, patient to go to .  Called report to Sonya (charge RN).  Called patient to give admission instructions, ETA 2030.  Update Provider Entering Orders (XM Plan & COVID Testing):  1940 - TAL HANDLEY [ Msg Id [id number] ] - needs COVID testing  Inpatient Lab (COVID Testing [phone], Option 2): 1941 - Brandie notified of STAT pre-op testing  Book OR: 2005 - Re, booked for 0600  Vessel Storage Confirmation (PA/KP/LI): Ok to bank, confirmed with [facility]  Blood Bank: 2011 - Hira notified  Research: ON HOLD  TransNet/ABO Verification: 1947 - Done  Add Organ: 1947 - Done  - - -   Donor OR Time: 0600  Procuring MD: Juan Miguel/Carmen  Contact in the OR: Denise  Organs Being Procured: LI/KI  Flush Solution: SPS  Biopsy: Intra-op LI Bx requested  Pump: N/A  Special Requests: N/A  MD for Visualization: Oskar  Transportation Details:  at 0130, Dr. Bullard and Dr. Morales updated  Myriam Acosta RN   Transplant Coordinator      August 19, 2021 1:48 AM  Donor OR bumped to 0600.  Dr. Peetrson to procure now.  Updated with P/U time at 0530.  Recipient OR bumped back to 0900 per Oskar.  Updated Bree at OR.  Myriam Acosta RN   Transplant Coordinator    8/19/2021 12:50 PM:   Per Dr. Peterson recip too sick for liver transplant. Coded out 801, notified Emma Isabel at [facility]. Spoke to Heruth in the OR and informed them that liver will be picked up from there, liver unable to be allocated.   Barbara Worrell  Transplant Coordinator    Attestation I have discussed all of the above with the Patient/Legal Guardian/Caregiver regarding this organ offer.: Yes  Coordinator/Fellow/Attending name:  Marce Worrell, RN

## 2021-08-19 NOTE — ANESTHESIA PREPROCEDURE EVALUATION
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]   Procedure : Procedure(s):  TRANSPLANT, LIVER, RECIPIENT,  DONOR     Past Medical History:   Diagnosis Date     Ascites      Biliary cirrhosis (H)      Cholangitis, sclerosing      Cirrhosis of liver with ascites (H) 3/3/2021     Hearing loss of left ear     wears a hearing aide     History of low potassium      Hyperlipidemia      Hypertension      SBP (spontaneous bacterial peritonitis) (H) 2021     Sjogren's syndrome (H)      Ulcerative colitis (H)      Ulcerative pancolitis (H)       Past Surgical History:   Procedure Laterality Date     ENDOSCOPIC RETROGRADE CHOLANGIOPANCREATOGRAM N/A 2021    Procedure: ENDOSCOPIC RETROGRADE CHOLANGIOPANCREATOGRAPHY with ballon sweep of bile ducts for stones, balloon dilation of bile ducts and bile duct stent placement;  Surgeon: Gregory Gabriel MD;  Location: [facility] OR     ENDOSCOPIC RETROGRADE CHOLANGIOPANCREATOGRAM N/A 2021    Procedure: ENDOSCOPIC RETROGRADE CHOLANGIOPANCREATOGRAPHY STONE REMOVAL, DILATION AND STENT PLACEMENT;  Surgeon: Gregory Gabriel MD;  Location:  OR     ENDOSCOPIC RETROGRADE CHOLANGIOPANCREATOGRAPHY, EXCHANGE TUBE/STENT N/A 2021    Procedure: ENDOSCOPIC RETROGRADE CHOLANGIOPANCREATOGRAPHY WITH STENT EXCHANGE, STONE EXTRACTION, AND DILATION;  Surgeon: Gregory Gabriel MD;  Location: [facility] OR     ENDOSCOPIC RETROGRADE CHOLANGIOPANCREATOGRAPHY, EXCHANGE TUBE/STENT N/A 5/10/2021    Procedure: ENDOSCOPIC RETROGRADE CHOLANGIOPANCREATOGRAPHY with biliary dilation, stone removal, stent exchange;  Surgeon: Gregory Gabriel MD;  Location: [facility] OR     ENDOSCOPIC RETROGRADE CHOLANGIOPANCREATOGRAPHY, EXCHANGE TUBE/STENT N/A 6/10/2021    Procedure: ENDOSCOPIC RETROGRADE CHOLANGIOPANCREATOGRAPHY, WITH biliary stent exchange, stone removal;  Surgeon: Woodrow Lott MD;  Location: [facility] OR      GYN SURGERY      bilat fallopian tubes and ovaries removed     PICC DOUBLE LUMEN PLACEMENT Right 05/08/2021    42cm (2cm external), Lateral brachial vein      Allergies   Allergen Reactions     Diagnostic X-Ray Materials Hives     PATIENT HAD HIVE REACTION AFTER ADMINISTERING CT CONTRAST DYE.       Contrast Dye       Social History     Tobacco Use     Smoking status: Never Smoker     Smokeless tobacco: Never Used   Substance Use Topics     Alcohol use: No     Comment: Last drink was 2017      Wt Readings from Last 1 Encounters:   08/18/21 59.5 kg (131 lb 2.8 oz)        Anesthesia Evaluation            ROS/MED HX  ENT/Pulmonary:  - neg pulmonary ROS     Neurologic:  - neg neurologic ROS     Cardiovascular:     (+) hypertension-----Previous cardiac testing   Echo: Date: Results:  Global and regional left ventricular function is normal with an EF of 60-65%.  Global right ventricular function is normal. The right ventricle is normal  size.Global and regional left ventricular function is normal with an EF of 60-65%.  Global right ventricular function is normal. The right ventricle is normal  size.  No significant valvular abnormalities.  IVC diameter <2.1 cm collapsing >50% with sniff suggests a normal RA pressure  of 3 mmHg.  Ascites is noted.  This study was compared with the study from 04/20/2021 (resting tomograms from  stress study). No significant changes noted.  No significant valvular abnormalities.  IVC diameter <2.1 cm collapsing >50% with sniff suggests a normal RA pressure  of 3 mmHg.  Ascites is noted.  This study was compared with the study from 04/20/2021 (resting tomograms from  stress study). No significant changes noted.  Stress Test: Date: Results:    ECG Reviewed: Date: Results:    Cath: Date: Results:   Pulmonary hypertension: 5/4/21.   METS/Exercise Tolerance:     Hematologic:       Musculoskeletal:       GI/Hepatic: Comment: Primary Sclerosing Cholangitis, Ulcerative pancolitis, ascites/  cirrhosis. Esophageal varices without bleeding.    (+) liver disease,     Renal/Genitourinary:       Endo:       Psychiatric/Substance Use:  - neg psychiatric ROS     Infectious Disease: Comment: Admited 4/29/21 with sepsis, SBP, and cholangitis s/p stent placement.       Malignancy:       Other:            Physical Exam    Airway        Mallampati: II   TM distance: > 3 FB   Neck ROM: full   Mouth opening: > 3 cm    Respiratory Devices and Support         Dental  no notable dental history         Cardiovascular   cardiovascular exam normal       Rhythm and rate: regular and normal     Pulmonary   pulmonary exam normal        breath sounds clear to auscultation           OUTSIDE LABS:  CBC:   Lab Results   Component Value Date    WBC 15.1 (H) 08/18/2021    WBC 14.3 (H) 08/16/2021    HGB 9.8 (L) 08/18/2021    HGB 10.8 (L) 08/16/2021    HCT 29.6 (L) 08/18/2021    HCT 32.5 (L) 08/16/2021     08/18/2021     (H) 08/16/2021     BMP:   Lab Results   Component Value Date     (L) 08/18/2021     (L) 08/16/2021    POTASSIUM 3.9 08/18/2021    POTASSIUM 4.3 08/16/2021    CHLORIDE 98 08/18/2021    CHLORIDE 97 08/16/2021    CO2 20 08/18/2021    CO2 20 08/16/2021    BUN 28 08/18/2021    BUN 27 08/16/2021    CR 0.65 08/18/2021    CR 0.74 08/16/2021     (H) 08/18/2021     (H) 08/16/2021     COAGS:   Lab Results   Component Value Date    PTT 30 08/18/2021    INR 1.45 (H) 08/18/2021    FIBR 588 (H) 08/18/2021     POC:   Lab Results   Component Value Date    BGM 95 06/25/2021     HEPATIC:   Lab Results   Component Value Date    ALBUMIN 2.0 (L) 08/18/2021    PROTTOTAL 6.7 (L) 08/18/2021    ALT 36 08/18/2021    AST 66 (H) 08/18/2021    ALKPHOS 400 (H) 08/18/2021    BILITOTAL 3.6 (H) 08/18/2021    MIHIR 48 06/09/2021     OTHER:   Lab Results   Component Value Date    LACT 1.0 08/18/2021    A1C 4.3 04/29/2021    MARIELENA 8.6 08/18/2021    PHOS 3.7 08/18/2021    MAG 2.0 08/18/2021    LIPASE 241 06/25/2021     AMYLASE 68 08/18/2021    CRP 23.1 (H) 06/08/2021       Anesthesia Plan    ASA Status:  3   NPO Status:  NPO Appropriate    Anesthesia Type: General.     - Airway: ETT   Induction: RSI.   Maintenance: Inhalation.   Techniques and Equipment:     - Lines/Monitors: 2nd IV, Arterial Line, Central Line, CVP, PAC, BIS, NIRS, PALMA            PALMA Absolute Contra-indication: NONE     - Blood: Blood in Room, PRBC, Cell Saver, FFP, PLT, Cryo     - Drips/Meds: Sufentanil, Norepi, Vasopressin, Epinephrine     Consents    Anesthesia Plan(s) and associated risks, benefits, and realistic alternatives discussed. Questions answered and patient/representative(s) expressed understanding.     - Discussed with:  Patient      - Extended Intubation/Ventilatory Support Discussed: Yes.      - Patient is DNR/DNI Status: No    Use of blood products discussed: Yes.     - Discussed with: Patient.     - Consented: consented to blood products            Reason for refusal: other.     Postoperative Care    Pain management: IV analgesics.   PONV prophylaxis: Ondansetron (or other 5HT-3)     Comments:                Robbin Lewis MD

## 2021-08-19 NOTE — PROGRESS NOTES
Admitted/transferred from: OR  Reason for admission/transfer: s/p aborted liver transplant  2 RN skin assessment: completed by EDWIN Lam RN  Result of skin assessment and interventions/actions:no concerns  Height, weight, drug calc weight:done  Patient belongings gym bag in room  MDRO education added to care plan N/A  ?

## 2021-08-19 NOTE — PROGRESS NOTES
I met the patient and explained the risk benefits and alternatives of emergent liver transplantation. We also discussed the liver could be large for size and we may have to do a staged closure of abdomen, all questions answered

## 2021-08-19 NOTE — ANESTHESIA PROCEDURE NOTES
Arterial Line Procedure Note  Pre-Procedure   Staff -        Anesthesiologist:  Alfredo Justice DO       Resident/Fellow: Steven Price DO       Performed By: resident       Location: OR       Time Interval: during induction       Pre-Anesthestic Checklist: patient identified, IV checked, risks and benefits discussed, informed consent, monitors and equipment checked, pre-op evaluation and at physician/surgeon's request  Timeout:       Correct Patient: Yes        Correct Procedure: Yes        Correct Site: Yes        Correct Position: Yes   Procedure   Procedure: arterial line       Laterality: right       Insertion Site: radial.  Sterile Prep        Skin prep: Chloraprep  Insertion/Injection        Technique: ultrasound guided and Seldinger Technique        1. Ultrasound was used to evaluate the access site.       2. Artery evaluated via ultrasound for patency/adequacy.       3. Using real-time ultrasound the needle/catheter was observed entering the artery/vein.       Catheter Type/Size: 20 G, 12 cm  Narrative         Secured by: anchor securement device       Tegaderm dressing used.       Complications: None apparent,        Arterial waveform: Yes        IBP within 10% of NIBP: Yes

## 2021-08-19 NOTE — CONSULTS
Transplant Admission Psychosocial Assessment    Patient Name: Berta Nava  : 1965  Age: 55 year old  MRN: [id number]  Date of Initial Social Work Evaluation: 3/23/21    Patient is in the OR for a  donor liver transplant.  I called [facility]  to update psychosocial assessment and provide education about SW role while inpatient, and to begin discussion of expectations/requirements, caregiver needs and follow up needs post-transplant.     Presenting Information   Living Situation: Berta lives with her  Demar in a house in [place], [place].   If not local, plans for short term stay: N/A  Previous Functional Status: independent with ADLs and IADLS  Cultural/Language/Spiritual Considerations: Gnosticist, English    Support System  Primary Support Person:  Demar   Other support: two adult children: Karin ([place], [place]) and Lind[place] ([facility]), sister Nadege ([place], [place])  Plan for support in immediate post-transplant period:  and family will provide  care giving    Health Care Directive  Decision Maker: patient  Alternate Decision Maker: Demar Nava is primary health care agent, daughter Karin Nava is the alternate agent  Health Care Directive: Copy in Chart    Mental Health/Coping:   History of Mental Health: Berta previously denied any mental health history.  History of Chemical Health: Berta denies any history of using tobacco products, pain medication abuse or illicit drug use.  Berta denies any history of alcohol abuse or dependency.  She reports her last consumption of alcohol was over six years ago.  Berta has no history of chemical dependency treatment, legal consequences of alcohol or drug use.  Current status: stable  Coping:  Demar reports patient was happy to be called in for a liver transplant.   Services Needed/Recommended: virtual liver transplant support group, supportive counseling    Financial   Income: Berta and her  both have  income from their wages.  Impact of transplant on income: minimal, patient was working up until transplant, has sick leave/short term disabiltiy  Insurance and medication coverage: [employer]  Financial concerns: none voiced  Resources needed: SW will review pharmacy tests claim when it becomes available    Education provided by [facility]: Social Work role inpatient setting, availability of virtual liver support group, parking information    Assessment and recommendations and plan:  Patient's  Demar is coping appropriately.  He is receiving medical updates every 2 hours and he really appreciates our team's communication.  I encouraged him to call with any questions/concerns.      This writer is off tomorrow (Friday). Please contact MIRTHA Henriquez if needs arise (pager [phone]).      JOSETTE Curiel, [facility]  Liver Transplant   Phone [phone]  Pager [phone]

## 2021-08-19 NOTE — PHARMACY-ADMISSION MEDICATION HISTORY
Admission Medication History Completed by Pharmacy    See [facility] Admission Navigator for allergy information, preferred outpatient pharmacy, prior to admission medications and immunization status.     Medication History Sources:     Patient, Surescripts fill history    Changes made to PTA medication list (reason):    Added: None    Deleted: None    Changed: None    Additional Information:    Patient had recent fills for cipro 250 mg daily (she reports this was stopped about a week ago) and a fill for Bactrim three times weekly prophylaxis (she reports she has been off of this for a few weeks).  These were not added to the medication list as no longer current.  Included here as a history of recent medication use.    Prior to Admission medications    Medication Sig Last Dose Taking? Auth Provider   calcium carbonate 600 mg-vitamin D 400 units (CALTRATE) 600-400 MG-UNIT per tablet Take 1 tablet by mouth every evening  8/17/2021 at Unknown time Yes Unknown, Entered By History   furosemide (LASIX) 20 MG tablet Take 2 tablets (40 mg) by mouth daily 8/18/2021 at am Yes Leventhal, Thomas Michael, MD   mesalamine (LIALDA) 1.2 g EC tablet Take 2 tablets (2,400 mg) by mouth daily (with breakfast) 8/18/2021 at am Yes Leventhal, Thomas Michael, MD   multivitamin (CENTRUM SILVER) tablet Take 1 tablet by mouth daily 8/18/2021 at am Yes Unknown, Entered By History   potassium chloride ER (MICRO-K) 10 MEQ CR capsule Take 10 mEq by mouth At Bedtime 8/17/2021 at pm Yes Reported, Patient   simvastatin (ZOCOR) 20 MG tablet Take 20 mg by mouth At Bedtime 8/17/2021 at pm Yes Unknown, Entered By History   spironolactone (ALDACTONE) 50 MG tablet Take 2 tablets (100 mg) by mouth daily 8/18/2021 at am Yes Leventhal, Thomas Michael, MD   ursodiol (ACTIGALL) 300 MG capsule Take 1 capsule (300 mg) by mouth 2 times daily 8/18/2021 at am Yes Leventhal, Thomas Michael, MD       Date completed: 08/18/21    Medication history completed by: Laury  Alvin, PharmD, BCPS

## 2021-08-20 ENCOUNTER — ANESTHESIA EVENT (OUTPATIENT)
Dept: INTENSIVE CARE | Facility: CLINIC | Age: 56
DRG: 420 | End: 2021-08-20
Payer: COMMERCIAL

## 2021-08-20 ENCOUNTER — APPOINTMENT (OUTPATIENT)
Dept: GENERAL RADIOLOGY | Facility: CLINIC | Age: 56
DRG: 420 | End: 2021-08-20
Attending: TRANSPLANT SURGERY
Payer: COMMERCIAL

## 2021-08-20 LAB
ALBUMIN SERPL-MCNC: 3.9 G/DL (ref 3.4–5)
ALBUMIN SERPL-MCNC: 4.2 G/DL (ref 3.4–5)
ALBUMIN SERPL-MCNC: 4.2 G/DL (ref 3.4–5)
ALBUMIN SERPL-MCNC: 4.4 G/DL (ref 3.4–5)
ALP SERPL-CCNC: 157 U/L (ref 40–150)
ALP SERPL-CCNC: 164 U/L (ref 40–150)
ALP SERPL-CCNC: 174 U/L (ref 40–150)
ALP SERPL-CCNC: 196 U/L (ref 40–150)
ALT SERPL W P-5'-P-CCNC: 24 U/L (ref 0–50)
ALT SERPL W P-5'-P-CCNC: 25 U/L (ref 0–50)
ALT SERPL W P-5'-P-CCNC: 26 U/L (ref 0–50)
ALT SERPL W P-5'-P-CCNC: 28 U/L (ref 0–50)
ANION GAP SERPL CALCULATED.3IONS-SCNC: 11 MMOL/L (ref 3–14)
ANION GAP SERPL CALCULATED.3IONS-SCNC: 13 MMOL/L (ref 3–14)
ANION GAP SERPL CALCULATED.3IONS-SCNC: 15 MMOL/L (ref 3–14)
AST SERPL W P-5'-P-CCNC: 49 U/L (ref 0–45)
AST SERPL W P-5'-P-CCNC: 50 U/L (ref 0–45)
AST SERPL W P-5'-P-CCNC: 52 U/L (ref 0–45)
AST SERPL W P-5'-P-CCNC: 59 U/L (ref 0–45)
BASE EXCESS BLDA CALC-SCNC: -10.2 MMOL/L (ref -9–1.8)
BASE EXCESS BLDA CALC-SCNC: -7 MMOL/L (ref -9–1.8)
BASE EXCESS BLDA CALC-SCNC: -9.9 MMOL/L (ref -9–1.8)
BILIRUB DIRECT SERPL-MCNC: 3 MG/DL (ref 0–0.2)
BILIRUB DIRECT SERPL-MCNC: 3 MG/DL (ref 0–0.2)
BILIRUB DIRECT SERPL-MCNC: 3.3 MG/DL (ref 0–0.2)
BILIRUB DIRECT SERPL-MCNC: 3.6 MG/DL (ref 0–0.2)
BILIRUB SERPL-MCNC: 4.3 MG/DL (ref 0.2–1.3)
BILIRUB SERPL-MCNC: 4.5 MG/DL (ref 0.2–1.3)
BILIRUB SERPL-MCNC: 4.9 MG/DL (ref 0.2–1.3)
BILIRUB SERPL-MCNC: 5.1 MG/DL (ref 0.2–1.3)
BUN SERPL-MCNC: 22 MG/DL (ref 7–30)
BUN SERPL-MCNC: 24 MG/DL (ref 7–30)
BUN SERPL-MCNC: 25 MG/DL (ref 7–30)
CALCIUM SERPL-MCNC: 8.6 MG/DL (ref 8.5–10.1)
CALCIUM SERPL-MCNC: 8.7 MG/DL (ref 8.5–10.1)
CALCIUM SERPL-MCNC: 9.4 MG/DL (ref 8.5–10.1)
CHLORIDE BLD-SCNC: 104 MMOL/L (ref 94–109)
CHLORIDE BLD-SCNC: 104 MMOL/L (ref 94–109)
CHLORIDE BLD-SCNC: 106 MMOL/L (ref 94–109)
CO2 SERPL-SCNC: 14 MMOL/L (ref 20–32)
CO2 SERPL-SCNC: 17 MMOL/L (ref 20–32)
CO2 SERPL-SCNC: 18 MMOL/L (ref 20–32)
CREAT SERPL-MCNC: 0.7 MG/DL (ref 0.52–1.04)
CREAT SERPL-MCNC: 0.78 MG/DL (ref 0.52–1.04)
CREAT SERPL-MCNC: 0.82 MG/DL (ref 0.52–1.04)
ERYTHROCYTE [DISTWIDTH] IN BLOOD BY AUTOMATED COUNT: 15.9 % (ref 10–15)
ERYTHROCYTE [DISTWIDTH] IN BLOOD BY AUTOMATED COUNT: 16.1 % (ref 10–15)
GFR SERPL CREATININE-BSD FRML MDRD: 80 ML/MIN/1.73M2
GFR SERPL CREATININE-BSD FRML MDRD: 85 ML/MIN/1.73M2
GFR SERPL CREATININE-BSD FRML MDRD: >90 ML/MIN/1.73M2
GLUCOSE BLD-MCNC: 164 MG/DL (ref 70–99)
GLUCOSE BLD-MCNC: 176 MG/DL (ref 70–99)
GLUCOSE BLD-MCNC: 199 MG/DL (ref 70–99)
GLUCOSE BLDC GLUCOMTR-MCNC: 147 MG/DL (ref 70–99)
GLUCOSE BLDC GLUCOMTR-MCNC: 147 MG/DL (ref 70–99)
GLUCOSE BLDC GLUCOMTR-MCNC: 149 MG/DL (ref 70–99)
GLUCOSE BLDC GLUCOMTR-MCNC: 155 MG/DL (ref 70–99)
HCO3 BLD-SCNC: 15 MMOL/L (ref 21–28)
HCO3 BLD-SCNC: 15 MMOL/L (ref 21–28)
HCO3 BLD-SCNC: 18 MMOL/L (ref 21–28)
HCT VFR BLD AUTO: 29.5 % (ref 35–47)
HCT VFR BLD AUTO: 30.9 % (ref 35–47)
HGB BLD-MCNC: 10.2 G/DL (ref 11.7–15.7)
HGB BLD-MCNC: 10.3 G/DL (ref 11.7–15.7)
HGB BLD-MCNC: 10.3 G/DL (ref 11.7–15.7)
HGB BLD-MCNC: 8.3 G/DL (ref 11.7–15.7)
HGB BLD-MCNC: 8.5 G/DL (ref 11.7–15.7)
HGB BLD-MCNC: 8.9 G/DL (ref 11.7–15.7)
HGB BLD-MCNC: 9.4 G/DL (ref 11.7–15.7)
LACTATE SERPL-SCNC: 1.2 MMOL/L (ref 0.7–2)
LACTATE SERPL-SCNC: 1.3 MMOL/L (ref 0.7–2)
LACTATE SERPL-SCNC: 1.5 MMOL/L (ref 0.7–2)
LACTATE SERPL-SCNC: 2.2 MMOL/L (ref 0.7–2)
LACTATE SERPL-SCNC: 4.6 MMOL/L (ref 0.7–2)
MAGNESIUM SERPL-MCNC: 2.3 MG/DL (ref 1.6–2.3)
MCH RBC QN AUTO: 29.9 PG (ref 26.5–33)
MCH RBC QN AUTO: 30.1 PG (ref 26.5–33)
MCHC RBC AUTO-ENTMCNC: 33.3 G/DL (ref 31.5–36.5)
MCHC RBC AUTO-ENTMCNC: 33.3 G/DL (ref 31.5–36.5)
MCV RBC AUTO: 88 FL (ref 78–100)
MCV RBC AUTO: 90 FL (ref 78–100)
MRSA DNA SPEC QL NAA+PROBE: NEGATIVE
O2/TOTAL GAS SETTING VFR VENT: 45 %
PCO2 BLD: 29 MM HG (ref 35–45)
PCO2 BLD: 30 MM HG (ref 35–45)
PCO2 BLD: 35 MM HG (ref 35–45)
PH BLD: 7.31 [PH] (ref 7.35–7.45)
PH BLD: 7.32 [PH] (ref 7.35–7.45)
PH BLD: 7.33 [PH] (ref 7.35–7.45)
PHOSPHATE SERPL-MCNC: 4.6 MG/DL (ref 2.5–4.5)
PLATELET # BLD AUTO: 405 10E3/UL (ref 150–450)
PLATELET # BLD AUTO: 405 10E3/UL (ref 150–450)
PO2 BLD: 101 MM HG (ref 80–105)
PO2 BLD: 115 MM HG (ref 80–105)
PO2 BLD: 90 MM HG (ref 80–105)
POTASSIUM BLD-SCNC: 3.7 MMOL/L (ref 3.4–5.3)
POTASSIUM BLD-SCNC: 4 MMOL/L (ref 3.4–5.3)
POTASSIUM BLD-SCNC: 4 MMOL/L (ref 3.4–5.3)
PROCALCITONIN SERPL-MCNC: 1.36 NG/ML
PROT SERPL-MCNC: 6.2 G/DL (ref 6.8–8.8)
PROT SERPL-MCNC: 6.5 G/DL (ref 6.8–8.8)
PROT SERPL-MCNC: 6.6 G/DL (ref 6.8–8.8)
PROT SERPL-MCNC: 6.7 G/DL (ref 6.8–8.8)
RBC # BLD AUTO: 3.35 10E6/UL (ref 3.8–5.2)
RBC # BLD AUTO: 3.44 10E6/UL (ref 3.8–5.2)
SA TARGET DNA: NEGATIVE
SODIUM SERPL-SCNC: 133 MMOL/L (ref 133–144)
SODIUM SERPL-SCNC: 134 MMOL/L (ref 133–144)
SODIUM SERPL-SCNC: 135 MMOL/L (ref 133–144)
TROPONIN I SERPL-MCNC: 0.24 UG/L (ref 0–0.04)
TROPONIN I SERPL-MCNC: 0.31 UG/L (ref 0–0.04)
TROPONIN I SERPL-MCNC: 0.33 UG/L (ref 0–0.04)
VANCOMYCIN SERPL-MCNC: 12.5 MG/L
WBC # BLD AUTO: 31.3 10E3/UL (ref 4–11)
WBC # BLD AUTO: 34.2 10E3/UL (ref 4–11)

## 2021-08-20 PROCEDURE — 250N000011 HC RX IP 250 OP 636: Performed by: TRANSPLANT SURGERY

## 2021-08-20 PROCEDURE — 258N000003 HC RX IP 258 OP 636: Performed by: STUDENT IN AN ORGANIZED HEALTH CARE EDUCATION/TRAINING PROGRAM

## 2021-08-20 PROCEDURE — 258N000003 HC RX IP 258 OP 636: Performed by: NURSE PRACTITIONER

## 2021-08-20 PROCEDURE — 85041 AUTOMATED RBC COUNT: CPT

## 2021-08-20 PROCEDURE — 258N000003 HC RX IP 258 OP 636

## 2021-08-20 PROCEDURE — 87641 MR-STAPH DNA AMP PROBE: CPT | Performed by: NURSE PRACTITIONER

## 2021-08-20 PROCEDURE — 93010 ELECTROCARDIOGRAM REPORT: CPT | Mod: 76 | Performed by: INTERNAL MEDICINE

## 2021-08-20 PROCEDURE — C9113 INJ PANTOPRAZOLE SODIUM, VIA: HCPCS | Performed by: NURSE PRACTITIONER

## 2021-08-20 PROCEDURE — 36620 INSERTION CATHETER ARTERY: CPT | Mod: GC | Performed by: INTERNAL MEDICINE

## 2021-08-20 PROCEDURE — 250N000011 HC RX IP 250 OP 636

## 2021-08-20 PROCEDURE — 200N000002 HC R&B ICU UMMC

## 2021-08-20 PROCEDURE — 999N000157 HC STATISTIC RCP TIME EA 10 MIN

## 2021-08-20 PROCEDURE — 82803 BLOOD GASES ANY COMBINATION: CPT | Performed by: NURSE PRACTITIONER

## 2021-08-20 PROCEDURE — 80202 ASSAY OF VANCOMYCIN: CPT | Performed by: TRANSPLANT SURGERY

## 2021-08-20 PROCEDURE — 80048 BASIC METABOLIC PNL TOTAL CA: CPT

## 2021-08-20 PROCEDURE — 84484 ASSAY OF TROPONIN QUANT: CPT | Performed by: STUDENT IN AN ORGANIZED HEALTH CARE EDUCATION/TRAINING PROGRAM

## 2021-08-20 PROCEDURE — 84450 TRANSFERASE (AST) (SGOT): CPT

## 2021-08-20 PROCEDURE — 80048 BASIC METABOLIC PNL TOTAL CA: CPT | Performed by: NURSE PRACTITIONER

## 2021-08-20 PROCEDURE — P9041 ALBUMIN (HUMAN),5%, 50ML: HCPCS

## 2021-08-20 PROCEDURE — 71045 X-RAY EXAM CHEST 1 VIEW: CPT | Mod: 26 | Performed by: RADIOLOGY

## 2021-08-20 PROCEDURE — 83605 ASSAY OF LACTIC ACID: CPT

## 2021-08-20 PROCEDURE — P9041 ALBUMIN (HUMAN),5%, 50ML: HCPCS | Performed by: NURSE PRACTITIONER

## 2021-08-20 PROCEDURE — 250N000009 HC RX 250: Performed by: NURSE ANESTHETIST, CERTIFIED REGISTERED

## 2021-08-20 PROCEDURE — 258N000003 HC RX IP 258 OP 636: Performed by: TRANSPLANT SURGERY

## 2021-08-20 PROCEDURE — 99222 1ST HOSP IP/OBS MODERATE 55: CPT | Mod: GC | Performed by: INTERNAL MEDICINE

## 2021-08-20 PROCEDURE — 99255 IP/OBS CONSLTJ NEW/EST HI 80: CPT | Performed by: STUDENT IN AN ORGANIZED HEALTH CARE EDUCATION/TRAINING PROGRAM

## 2021-08-20 PROCEDURE — 250N000012 HC RX MED GY IP 250 OP 636 PS 637: Performed by: NURSE PRACTITIONER

## 2021-08-20 PROCEDURE — 82040 ASSAY OF SERUM ALBUMIN: CPT

## 2021-08-20 PROCEDURE — 80048 BASIC METABOLIC PNL TOTAL CA: CPT | Performed by: TRANSPLANT SURGERY

## 2021-08-20 PROCEDURE — 99291 CRITICAL CARE FIRST HOUR: CPT | Mod: GC | Performed by: SURGERY

## 2021-08-20 PROCEDURE — 71045 X-RAY EXAM CHEST 1 VIEW: CPT

## 2021-08-20 PROCEDURE — 84100 ASSAY OF PHOSPHORUS: CPT | Performed by: TRANSPLANT SURGERY

## 2021-08-20 PROCEDURE — 250N000011 HC RX IP 250 OP 636: Performed by: STUDENT IN AN ORGANIZED HEALTH CARE EDUCATION/TRAINING PROGRAM

## 2021-08-20 PROCEDURE — 84155 ASSAY OF PROTEIN SERUM: CPT

## 2021-08-20 PROCEDURE — 36415 COLL VENOUS BLD VENIPUNCTURE: CPT | Performed by: TRANSPLANT SURGERY

## 2021-08-20 PROCEDURE — 84145 PROCALCITONIN (PCT): CPT

## 2021-08-20 PROCEDURE — 94003 VENT MGMT INPAT SUBQ DAY: CPT

## 2021-08-20 PROCEDURE — 85018 HEMOGLOBIN: CPT

## 2021-08-20 PROCEDURE — 82803 BLOOD GASES ANY COMBINATION: CPT

## 2021-08-20 PROCEDURE — 83735 ASSAY OF MAGNESIUM: CPT | Performed by: TRANSPLANT SURGERY

## 2021-08-20 PROCEDURE — 250N000011 HC RX IP 250 OP 636: Performed by: NURSE PRACTITIONER

## 2021-08-20 PROCEDURE — 99024 POSTOP FOLLOW-UP VISIT: CPT | Performed by: TRANSPLANT SURGERY

## 2021-08-20 RX ORDER — ALBUMIN, HUMAN INJ 5% 5 %
0-50 SOLUTION INTRAVENOUS EVERY 6 HOURS PRN
Status: DISCONTINUED | OUTPATIENT
Start: 2021-08-20 | End: 2021-08-20

## 2021-08-20 RX ORDER — DEXTROSE MONOHYDRATE 25 G/50ML
25-50 INJECTION, SOLUTION INTRAVENOUS
Status: DISCONTINUED | OUTPATIENT
Start: 2021-08-20 | End: 2021-08-20

## 2021-08-20 RX ORDER — ALBUMIN, HUMAN INJ 5% 5 %
25 SOLUTION INTRAVENOUS ONCE
Status: COMPLETED | OUTPATIENT
Start: 2021-08-20 | End: 2021-08-20

## 2021-08-20 RX ORDER — MEROPENEM 1 G/1
1 INJECTION, POWDER, FOR SOLUTION INTRAVENOUS EVERY 8 HOURS
Status: DISCONTINUED | OUTPATIENT
Start: 2021-08-20 | End: 2021-08-22

## 2021-08-20 RX ORDER — NICOTINE POLACRILEX 4 MG
15-30 LOZENGE BUCCAL
Status: DISCONTINUED | OUTPATIENT
Start: 2021-08-20 | End: 2021-08-20

## 2021-08-20 RX ORDER — ALBUMIN, HUMAN INJ 5% 5 %
0-50 SOLUTION INTRAVENOUS
Status: DISCONTINUED | OUTPATIENT
Start: 2021-08-20 | End: 2021-08-20

## 2021-08-20 RX ORDER — ALBUMIN, HUMAN INJ 5% 5 %
0-50 SOLUTION INTRAVENOUS
Status: DISCONTINUED | OUTPATIENT
Start: 2021-08-20 | End: 2021-08-25

## 2021-08-20 RX ORDER — POTASSIUM CHLORIDE 7.45 MG/ML
10 INJECTION INTRAVENOUS ONCE
Status: COMPLETED | OUTPATIENT
Start: 2021-08-20 | End: 2021-08-20

## 2021-08-20 RX ADMIN — ANGIOTENSIN II 20 NG/KG/MIN: 2.5 INJECTION INTRAVENOUS at 01:04

## 2021-08-20 RX ADMIN — VASOPRESSIN 2.4 UNITS/HR: 20 INJECTION INTRAVENOUS at 11:38

## 2021-08-20 RX ADMIN — PROPOFOL 35 MCG/KG/MIN: 10 INJECTION, EMULSION INTRAVENOUS at 08:26

## 2021-08-20 RX ADMIN — PANTOPRAZOLE SODIUM 40 MG: 40 INJECTION, POWDER, FOR SOLUTION INTRAVENOUS at 09:03

## 2021-08-20 RX ADMIN — ALBUMIN HUMAN 25 G: 0.05 INJECTION, SOLUTION INTRAVENOUS at 04:31

## 2021-08-20 RX ADMIN — ALBUMIN HUMAN 12.5 G: 0.05 INJECTION, SOLUTION INTRAVENOUS at 20:44

## 2021-08-20 RX ADMIN — ALBUMIN HUMAN 12.5 G: 0.05 INJECTION, SOLUTION INTRAVENOUS at 22:38

## 2021-08-20 RX ADMIN — ALBUMIN HUMAN 25 G: 0.05 INJECTION, SOLUTION INTRAVENOUS at 08:38

## 2021-08-20 RX ADMIN — AMIODARONE HYDROCHLORIDE 0.5 MG/MIN: 50 INJECTION, SOLUTION INTRAVENOUS at 10:38

## 2021-08-20 RX ADMIN — SODIUM CHLORIDE, POTASSIUM CHLORIDE, SODIUM LACTATE AND CALCIUM CHLORIDE 500 ML: 600; 310; 30; 20 INJECTION, SOLUTION INTRAVENOUS at 14:47

## 2021-08-20 RX ADMIN — ALBUMIN HUMAN 12.5 G: 0.05 INJECTION, SOLUTION INTRAVENOUS at 18:25

## 2021-08-20 RX ADMIN — ALBUMIN HUMAN 25 G: 0.05 INJECTION, SOLUTION INTRAVENOUS at 09:16

## 2021-08-20 RX ADMIN — POTASSIUM CHLORIDE 10 MEQ: 7.46 INJECTION, SOLUTION INTRAVENOUS at 03:22

## 2021-08-20 RX ADMIN — ALBUMIN HUMAN 12.5 G: 0.05 INJECTION, SOLUTION INTRAVENOUS at 11:16

## 2021-08-20 RX ADMIN — PROPOFOL 25 MCG/KG/MIN: 10 INJECTION, EMULSION INTRAVENOUS at 18:59

## 2021-08-20 RX ADMIN — HYDROCORTISONE SODIUM SUCCINATE 100 MG: 100 INJECTION, POWDER, FOR SOLUTION INTRAMUSCULAR; INTRAVENOUS at 23:53

## 2021-08-20 RX ADMIN — ALBUMIN HUMAN 12.5 G: 0.05 INJECTION, SOLUTION INTRAVENOUS at 12:34

## 2021-08-20 RX ADMIN — PROPOFOL 55 MCG/KG/MIN: 10 INJECTION, EMULSION INTRAVENOUS at 02:19

## 2021-08-20 RX ADMIN — VASOPRESSIN 2.4 UNITS/HR: 20 INJECTION INTRAVENOUS at 03:35

## 2021-08-20 RX ADMIN — MEROPENEM 1 G: 1 INJECTION, POWDER, FOR SOLUTION INTRAVENOUS at 09:03

## 2021-08-20 RX ADMIN — INSULIN ASPART 1 UNITS: 100 INJECTION, SOLUTION INTRAVENOUS; SUBCUTANEOUS at 17:07

## 2021-08-20 RX ADMIN — VASOPRESSIN 2.4 UNITS/HR: 20 INJECTION INTRAVENOUS at 20:55

## 2021-08-20 RX ADMIN — ALBUMIN HUMAN 12.5 G: 0.05 INJECTION, SOLUTION INTRAVENOUS at 12:04

## 2021-08-20 RX ADMIN — INSULIN ASPART 1 UNITS: 100 INJECTION, SOLUTION INTRAVENOUS; SUBCUTANEOUS at 20:07

## 2021-08-20 RX ADMIN — VANCOMYCIN HYDROCHLORIDE 1250 MG: 100 INJECTION, POWDER, LYOPHILIZED, FOR SOLUTION INTRAVENOUS at 22:42

## 2021-08-20 RX ADMIN — EPINEPHRINE 0.14 MCG/KG/MIN: 1 INJECTION PARENTERAL at 01:02

## 2021-08-20 RX ADMIN — SODIUM CHLORIDE, POTASSIUM CHLORIDE, SODIUM LACTATE AND CALCIUM CHLORIDE 250 ML: 600; 310; 30; 20 INJECTION, SOLUTION INTRAVENOUS at 13:13

## 2021-08-20 RX ADMIN — HYDROCORTISONE SODIUM SUCCINATE 100 MG: 100 INJECTION, POWDER, FOR SOLUTION INTRAMUSCULAR; INTRAVENOUS at 07:54

## 2021-08-20 RX ADMIN — HYDROCORTISONE SODIUM SUCCINATE 100 MG: 100 INJECTION, POWDER, FOR SOLUTION INTRAMUSCULAR; INTRAVENOUS at 14:43

## 2021-08-20 RX ADMIN — MEROPENEM 1 G: 1 INJECTION, POWDER, FOR SOLUTION INTRAVENOUS at 17:04

## 2021-08-20 RX ADMIN — Medication 0.2 MCG/KG/MIN: at 07:06

## 2021-08-20 RX ADMIN — MEROPENEM 1 G: 1 INJECTION, POWDER, FOR SOLUTION INTRAVENOUS at 02:36

## 2021-08-20 ASSESSMENT — ACTIVITIES OF DAILY LIVING (ADL)
ADLS_ACUITY_SCORE: 20
ADLS_ACUITY_SCORE: 18
ADLS_ACUITY_SCORE: 20
ADLS_ACUITY_SCORE: 17

## 2021-08-20 ASSESSMENT — MIFFLIN-ST. JEOR: SCORE: 1216.88

## 2021-08-20 NOTE — PROGRESS NOTES
SPIRITUAL HEALTH SERVICES  SPIRITUAL ASSESSMENT Progress Note  [facility] 4A     REFERRAL SOURCE: Follow up    Brief check in with Karin to connect with Demar re:  request. Demar not available at this time. Also offered to brighten up Berta's room with photos; Karin shared that Berta likes dunn.    PLAN: I will follow up when on call tomorrow to assess  request and deliver photos for room. Spiritual Health Services remains available for patient, family, and staff support.     Please page the on call  either via [employer] (Spiritual Health/[facility]) or by placing a STAT or ASAP Epic referral for Spiritual Health (which will roll to the on call pager).        Nini Page  Chaplain Resident  Pager: [phone]

## 2021-08-20 NOTE — PLAN OF CARE
Reason for admission: S/p attempted liver tx.   RN assumed cares [id number]    Shift events: Pt remains sedated, pupils 2-3 & equal/reactive. Angiotensin II started overnight to maintain MAPs >65, was able to wean down on levo & epi since starting angiotensin II. New art line placed in L groin & R radial art line removed as waveform was diminished.  Sinus rhythm 70-80s. Temps averaging around 99 degrees. Vent settings changed around 0300 to TV of 450 - currently on CMV settings FiO2 45%, RR12, PEEP 5, . Scant amt of cloudy thin secretions from ETT. NG to LIS with no output. ROMA drain with fluctuating amt of output, anywhere from  ml/hr. [place] in place, borderline low UO. This AM UO noted to be dropping to around 15cc/hr - MD aware. 500cc albumin bolus given overnight. [place] catheter removed per MD order as catheter was not in appropriate position. Lactic remains around 4, ABGs show metabolic acidosis. Hgb stable around 10. Electrolytes stable this morning, no need for replacement.   Gtts: amiodarone at 0.5Mg/min ; propofol at 35mcg/kg/min ; fentanyl At 75mcg/hr ; levophed at 0.2mcg/kg/min ; epinephrine at 0.04 mcg/kg/min ; vaso at 2.4 units/hr ; angiotensin II at ng/kg/min   Skin: 2 RN skin assessment completed by Jinny HENRIQUEZ RN & Flavio SCHWARTZ RN. Results: small open blister on back; clamshell incision on abdomen w/ CDI drsg; RLQ ROMA drain; Skin tear on R forearm; multiple small lumps on back of head.     Recommendations/POC: Potential cardiac MRI, continue to wean off pressors. Monitor UO. Continue to monitor and follow POC.     For vital signs and complete assessments, please see documentation flowsheets.

## 2021-08-20 NOTE — CONSULTS
GASTROENTEROLOGY CONSULTATION      Date of Admission: 8/18/2021       Date of Consult: 8/20/21          Chief Complaint:   We were asked by Ernesto Schmitt MD to evaluate this patient with PSC/concern for sepsis.          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 Intraop, and prior to transplant, she developed vtach requiring 5-6 rounds of debrillation and the procedure was aborted. Now in ICU on pressors and antibiotics. Concern for possible septic/distributive shock.    Patient had a last ERCP 7/22/21 with removal of biliary stones/sludge, focal stricture off the right main with upstream saccular dilations with stones. Branch off right main and left intrahepatic dilated to 6 mm, a 10 Fr x 22 cm Johlin stent placed into left intrahepatic duct, 10 Fr x 17 cm Johlin stent placed into right intrahepatic duct, and 7 Fr x 15 cm single pigtail Zimmon stent placed into likely a right anterior duct. Given concern for sepsis in setting of existing biliary stents and elevated LFTs together with leukocytosis, will plan for an ERCP in OR on 8/21/21.    Recommendations  - On schedule for ERCP tomorrow 8/21/21 with Dr. Singh.   - NPO pass MN, hold antithrombotics if any for the procedure  - Critical care and IV antimicrobials per treatment teams  - Discussed with primary team, patient's daughter and obtained consent for procedure  - Inpatient Panc/Bili service will continue to follow with you      Patient care plan discussed with Dr. Singh, GI staff physician. Thank you for involving us in this patient's care. Please do not hesitate to contact the GI service with any questions or concerns.     Delmy Beasley MD  [facility]  Fellow  #[id number]  -------------------------------------------------------------------------------------------------------------------   History is obtained from the chart, teams and family.          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 Intraop, and prior to transplant, she developed vtach requiring 5-6 rounds of debrillation and the procedure was aborted. Now in ICU on pressors and antibiotics. Concern for possible septic/distributive shock.  She remains in SICU, now on 3 pressors, weaning to two, also on IV antimicrobials. Awake, open eyes. No further history is obtainable from patient.   Per chart review, she had no recent illness, patient was getting weekly paracentesis. Per ID, uncertain if infection lead to decompensation during surgery, awaiting infectious workup for now. Per cardiology notes, unlikely a primary cardiac events. Last ERCP from a month ago with biliary stones/sludge removal and placement of 3 biliary stents in different intrahepatic sectors.             Past Medical History:   Reviewed and edited as appropriate  Past Medical History:   Diagnosis Date     Ascites      Biliary cirrhosis (H)      Cholangitis, sclerosing      Cirrhosis of liver with ascites (H) 3/3/2021     Hearing loss of left ear     wears a hearing aide     History of low potassium      Hyperlipidemia      Hypertension      SBP (spontaneous bacterial peritonitis) (H) 4/30/2021     Sjogren's syndrome (H)      Ulcerative colitis (H)      Ulcerative pancolitis (H)             Past Surgical History:   Reviewed and edited as appropriate   Past Surgical History:   Procedure Laterality Date     ENDOSCOPIC RETROGRADE CHOLANGIOPANCREATOGRAM N/A 6/25/2021    Procedure:  ENDOSCOPIC RETROGRADE CHOLANGIOPANCREATOGRAPHY with ballon sweep of bile ducts for stones, balloon dilation of bile ducts and bile duct stent placement;  Surgeon: Gregory Gabriel MD;  Location: [facility] OR     ENDOSCOPIC RETROGRADE CHOLANGIOPANCREATOGRAM N/A 2021    Procedure: ENDOSCOPIC RETROGRADE CHOLANGIOPANCREATOGRAPHY STONE REMOVAL, DILATION AND STENT PLACEMENT;  Surgeon: Gregory Gabriel MD;  Location: [facility] OR     ENDOSCOPIC RETROGRADE CHOLANGIOPANCREATOGRAPHY, EXCHANGE TUBE/STENT N/A 2021    Procedure: ENDOSCOPIC RETROGRADE CHOLANGIOPANCREATOGRAPHY WITH STENT EXCHANGE, STONE EXTRACTION, AND DILATION;  Surgeon: Gregory Gabriel MD;  Location: [facility] OR     ENDOSCOPIC RETROGRADE CHOLANGIOPANCREATOGRAPHY, EXCHANGE TUBE/STENT N/A 5/10/2021    Procedure: ENDOSCOPIC RETROGRADE CHOLANGIOPANCREATOGRAPHY with biliary dilation, stone removal, stent exchange;  Surgeon: Gregory Gabriel MD;  Location: [facility] OR     ENDOSCOPIC RETROGRADE CHOLANGIOPANCREATOGRAPHY, EXCHANGE TUBE/STENT N/A 6/10/2021    Procedure: ENDOSCOPIC RETROGRADE CHOLANGIOPANCREATOGRAPHY, WITH biliary stent exchange, stone removal;  Surgeon: Woodrow Lott MD;  Location: [facility] OR     GYN SURGERY      bilat fallopian tubes and ovaries removed     PICC DOUBLE LUMEN PLACEMENT Right 2021    42cm (2cm external), Lateral brachial vein     TRANSPLANT LIVER RECIPIENT  DONOR N/A 2021    Procedure: Opening of abdomen, Abdominal Exploration and aborted liver transplant.;  Surgeon: Ernesto Schmitt MD;  Location: [facility] OR            Previous Endoscopy:   No results found for this or any previous visit.         Social History:   Reviewed and edited as appropriate  Social History     Socioeconomic History     Marital status:      Spouse name: Not on file     Number of children: Not on file     Years of education: Not on file     Highest education level: Not on file   Occupational History     Not on file   Tobacco Use     Smoking status: Never  Smoker     Smokeless tobacco: Never Used   Substance and Sexual Activity     Alcohol use: No     Comment: Last drink was 2017     Drug use: No     Sexual activity: Not on file   Other Topics Concern     Parent/sibling w/ CABG, MI or angioplasty before 65F 55M? Not Asked   Social History Narrative     Not on file     Social Determinants of Health     Financial Resource Strain:      Difficulty of Paying Living Expenses:    Food Insecurity:      Worried About Running Out of Food in the Last Year:      Ran Out of Food in the Last Year:    Transportation Needs:      Lack of Transportation (Medical):      Lack of Transportation (Non-Medical):    Physical Activity:      Days of Exercise per Week:      Minutes of Exercise per Session:    Stress:      Feeling of Stress :    Social Connections:      Frequency of Communication with Friends and Family:      Frequency of Social Gatherings with Friends and Family:      Attends [employer] Services:      Active Member of Clubs or Organizations:      Attends Club or Organization Meetings:      Marital Status:    Intimate Partner Violence:      Fear of Current or Ex-Partner:      Emotionally Abused:      Physically Abused:      Sexually Abused:             Family History:   Reviewed and edited as appropriate  Family History   Problem Relation Age of Onset     Cancer Mother         angiosarcoma     Coronary Artery Disease Early Onset Father         MI age 46     Heart Transplant Father      Liver Disease No family hx of      Ulcerative Colitis No family hx of      Crohn's Disease No family hx of            Allergies:   Reviewed and edited as appropriate     Allergies   Allergen Reactions     Diagnostic X-Ray Materials Hives     PATIENT HAD HIVE REACTION AFTER ADMINISTERING CT CONTRAST DYE.       Contrast Dye             Medications:     Current Facility-Administered Medications   Medication     albumin human 5 % injection 0-50 g     amiodarone (NEXTERONE) 900 mg in sodium chloride 0.9  "% 500 mL infusion     angiotensin II (GIAPREZA) ADULT infusion 2.5mg/250 mL NS     glucose gel 15-30 g    Or     dextrose 50 % injection 25-50 mL    Or     glucagon injection 1 mg     EPINEPHrine (ADRENALIN) 16 mg in sodium chloride 0.9 % 250 mL infusion     fentaNYL (SUBLIMAZE) 50 mcg/mL bolus from infusion pump 25 mcg     fentaNYL (SUBLIMAZE) infusion     hydrocortisone sodium succinate PF (solu-CORTEF) injection 100 mg     insulin aspart (NovoLOG) injection (RAPID ACTING)     lactated ringers BOLUS 500 mL     meropenem (MERREM) 1 g vial to attach to  mL bag     naloxone (NARCAN) injection 0.2 mg    Or     naloxone (NARCAN) injection 0.4 mg    Or     naloxone (NARCAN) injection 0.2 mg    Or     naloxone (NARCAN) injection 0.4 mg     norepinephrine (LEVOPHED) 16 mg in  mL infusion MAX CONC CENTRAL LINE     pantoprazole (PROTONIX) IV push injection 40 mg     propofol (DIPRIVAN) infusion     vancomycin 1250 mg in 0.9% NaCl 250 mL intermittent infusion 1,250 mg     vasopressin 0.2 units/mL in NS (PITRESSIN) standard conc infusion             Review of Systems:   A complete review of systems was performed and is negative except as noted in the HPI           Physical Exam:   /66 (BP Location: Left arm)   Pulse 62   Temp 98.2  F (36.8  C)   Resp 16   Ht 1.651 m (5' 5\")   Wt 63.1 kg (139 lb 1.8 oz)   SpO2 98%   BMI 23.15 kg/m    Wt:   Wt Readings from Last 2 Encounters:   08/20/21 63.1 kg (139 lb 1.8 oz)   08/16/21 62.6 kg (138 lb)      Constitutional: Intubated, open eyes, in NAD  Eyes: Sclera icteric/injected  CV: RRR  Respiratory: Unlabored breathing  Lymph: No axillary, submandibular, supraclavicular or inguinal lymphadenopathy  Abd: surgical dressing, + BS, mildly distended  Skin: warm, perfused, no jaundice  Neuro: Intubated, sedated           Data:   Labs and imaging below were independently reviewed and interpreted    BMP  Recent Labs   Lab 08/20/21  1148 08/20/21  0859 08/20/21  0849 " 08/20/21  0418 08/20/21  0017 08/19/21  1839   NA  --   --  135 133 134 133   POTASSIUM  --   --  4.0 4.0 3.7 4.5   CHLORIDE  --   --  106 104 104 103   MARIELENA  --   --  8.7 9.4 8.6 8.6   CO2  --   --  18* 14* 17* 17*   BUN  --   --  25 24 22 19   CR  --   --  0.82 0.78 0.70 0.57   * 147* 176* 199* 164* 141*     CBC  Recent Labs   Lab 08/20/21  1143 08/20/21  0419 08/19/21  2347 08/19/21  1839 08/19/21  1424   WBC  --  31.3* 34.2* 42.3* 29.2*   RBC  --  3.44* 3.35* 3.72* 3.73*   HGB 8.9* 10.3*  10.3* 10.2*  10.2* 11.2* 11.1*   HCT  --  30.9* 29.5* 32.6* 33.7*   MCV  --  90 88 88 90   MCH  --  29.9 30.1 30.1 29.8   MCHC  --  33.3 33.3 34.4 32.9   RDW  --  15.9* 16.1* 15.5* 15.0   PLT  --  405 405 466* 422     INR  Recent Labs   Lab 08/19/21  1839 08/19/21  1424 08/18/21  2132   INR 1.51* 1.61* 1.45*     LFTs  Recent Labs   Lab 08/20/21  1143 08/20/21  0849 08/19/21  1424 08/18/21  2132   ALKPHOS 174* 196* 276* 400*   AST 49* 59* 51* 66*   ALT 26 28 27 36   BILITOTAL 4.9* 5.1* 3.8* 3.6*   PROTTOTAL 6.6* 6.7* 6.3* 6.7*   ALBUMIN 4.2 3.9 3.0* 2.0*      PANC  Recent Labs   Lab 08/18/21  2132   AMYLASE 68       Imaging

## 2021-08-20 NOTE — CONSULTS
Cardiac Electrophysiology consultation      Reason For Consultation: VT     HPI:    56 y/o lady admitted yesterday for liver transplant today. Unfortunately had 3 reported episodes of VT that required defib. Transplant was aborted. Remains on ventilator and 3 pressors.    Hx significant for:  PSC/UC leading to hepatic cirrhosis  Complicated by ascites, biliary strictures and choledocholithiasis    Please see A&P.    Past Medical History:      Past Medical History:   Diagnosis Date     Ascites      Biliary cirrhosis (H)      Cholangitis, sclerosing      Cirrhosis of liver with ascites (H) 3/3/2021     Hearing loss of left ear     wears a hearing aide     History of low potassium      Hyperlipidemia      Hypertension      SBP (spontaneous bacterial peritonitis) (H) 4/30/2021     Sjogren's syndrome (H)      Ulcerative colitis (H)      Ulcerative pancolitis (H)        Past Surgical  History:      Past Surgical History:   Procedure Laterality Date     ENDOSCOPIC RETROGRADE CHOLANGIOPANCREATOGRAM N/A 6/25/2021    Procedure: ENDOSCOPIC RETROGRADE CHOLANGIOPANCREATOGRAPHY with ballon sweep of bile ducts for stones, balloon dilation of bile ducts and bile duct stent placement;  Surgeon: Gregory Gabriel MD;  Location:  OR     ENDOSCOPIC RETROGRADE CHOLANGIOPANCREATOGRAM N/A 7/22/2021    Procedure: ENDOSCOPIC RETROGRADE CHOLANGIOPANCREATOGRAPHY STONE REMOVAL, DILATION AND STENT PLACEMENT;  Surgeon: Gregory Gabriel MD;  Location:  OR     ENDOSCOPIC RETROGRADE CHOLANGIOPANCREATOGRAPHY, EXCHANGE TUBE/STENT N/A 05/05/2021    Procedure: ENDOSCOPIC RETROGRADE CHOLANGIOPANCREATOGRAPHY WITH STENT EXCHANGE, STONE EXTRACTION, AND DILATION;  Surgeon: Gregory Gabriel MD;  Location:  OR     ENDOSCOPIC RETROGRADE CHOLANGIOPANCREATOGRAPHY, EXCHANGE TUBE/STENT N/A 5/10/2021    Procedure: ENDOSCOPIC RETROGRADE CHOLANGIOPANCREATOGRAPHY with biliary dilation, stone removal, stent exchange;  Surgeon: Gregory Gabriel MD;  Location:  OR      ENDOSCOPIC RETROGRADE CHOLANGIOPANCREATOGRAPHY, EXCHANGE TUBE/STENT N/A 6/10/2021    Procedure: ENDOSCOPIC RETROGRADE CHOLANGIOPANCREATOGRAPHY, WITH biliary stent exchange, stone removal;  Surgeon: Woodrow Lott MD;  Location: [facility] OR     GYN SURGERY      bilat fallopian tubes and ovaries removed     PICC DOUBLE LUMEN PLACEMENT Right 05/08/2021    42cm (2cm external), Lateral brachial vein       Family History:      Family History   Problem Relation Age of Onset     Cancer Mother         angiosarcoma     Coronary Artery Disease Early Onset Father         MI age 46     Heart Transplant Father      Liver Disease No family hx of      Ulcerative Colitis No family hx of      Crohn's Disease No family hx of        Social History:      Social History     Socioeconomic History     Marital status:      Spouse name: Not on file     Number of children: Not on file     Years of education: Not on file     Highest education level: Not on file   Occupational History     Not on file   Tobacco Use     Smoking status: Never Smoker     Smokeless tobacco: Never Used   Substance and Sexual Activity     Alcohol use: No     Comment: Last drink was 2017     Drug use: No     Sexual activity: Not on file   Other Topics Concern     Parent/sibling w/ CABG, MI or angioplasty before 65F 55M? Not Asked   Social History Narrative     Not on file     Social Determinants of Health     Financial Resource Strain:      Difficulty of Paying Living Expenses:    Food Insecurity:      Worried About Running Out of Food in the Last Year:      Ran Out of Food in the Last Year:    Transportation Needs:      Lack of Transportation (Medical):      Lack of Transportation (Non-Medical):    Physical Activity:      Days of Exercise per Week:      Minutes of Exercise per Session:    Stress:      Feeling of Stress :    Social Connections:      Frequency of Communication with Friends and Family:      Frequency of Social Gatherings with Friends and Family:  "     Attends Den[employer]ational Services:      Active Member of Clubs or Organizations:      Attends Club or Organization Meetings:      Marital Status:    Intimate Partner Violence:      Fear of Current or Ex-Partner:      Emotionally Abused:      Physically Abused:      Sexually Abused:        ROS:    A complete review of systems is negative except as noted above in the HPI.    Physical Exam:   Vitals: /60   Pulse 98   Temp 99.9  F (37.7  C)   Resp 23   Ht 1.651 m (5' 5\")   Wt 59.5 kg (131 lb 2.8 oz)   SpO2 97%   BMI 21.83 kg/m    Gen: Acutely ill   Neck: No appreciable JVP  Chest: On ventilator but CTAB  Cardiovascular: RRR, no M/R/G rate ~ 90-100bpm  Abd: did not assess  Neuro: intubated and sedated  Extremities: warm, no edema         Relevant labs, imaging, medications and procedures were reviewed.  ABG reportedly pre op 7.18/50/88  Current ABG 7.34/33/102  K 3.0  Mg 2.3  LA 2.3  WBC 42.3  Hb 11.2  Plts 466  INR 1.5  Trop 0.152    DSE 4/21: no inducible ischemia, wnl  TTE today: LVEF 60-65%, unremarkable  EKG yesterday: NSR, QTc 444  CXR: no evidence of pulmonary congestion   Telemetry reveals sinus rhythm with rates 80 - 100 bpm  CTPE: no evidence of acute PE or right heart strain    Sedated on fentanyl and propofol    Amio  Epi  Norepi  Vaso  Vanc & Zosyn    Assessment and Recommendations:   54 y/o lady admitted yesterday for a liver transplant today. Hx as above. Reportedly had 3 episodes of VT requiring defib intra operatively. Transplant aborted. Transferred to SICU on ventilator, 3 pressors and amio gtt. Labs and imaging as above. Unfortunately I am unable to track down any rhythm strips during her VT - unclear if monomorphic, polymorphic, how it initiated, cycle length, SVT with aberrancy etc. Given her pre op cardiac evaluation and post VT TTE, which were normal, it is highly unlikely this was primarily a cardiac issue but rather in response to electrolyte abnormalities in the setting of what " appears to be septic shock.    #VT  -Continue amio gtt  -Obtain EKG  -Optimize electrolytes  -No need for cardiac MR at this time  -Telemetry  -Will continue to follow    I discussed the patient with Dr. Nobles.    Kun Davis MD   Cardiac electrophysiology fellow  Addendum:  I saw and evaluated the patient and agree with the fellow s finding and plans as written.  VT occurrence without any SHD is very unlikely. The wide QRS tachycardia could be AF with RVR and aberrancy. But, no specific information about the tachycardia could be obtained. Whether it was AF or VT, amiodarone would be the best treatment option at this point.  Namrata Nobles MD

## 2021-08-20 NOTE — PROCEDURES
Arterial Line Insertion  Date of Service: 8/20/2021  Pre-procedure diagnosis: Septic Shock  Location: Left Femoral Artery  Performed by: Mabel Avendano DO, PGY2  Supervised by: Dr. Darling Sheikh MD    Indications:  The patient is a 55 year old female with a history of liver failure who presented with septic shock requiring vasopressor management and arterial line placement.    Procedure details:   Anesthesia was achieved with fentanyl and propofol deep sedation. Initially, the Right axilla was prepped and draped in sterile fashion. Ultrasound was used to identify the Right axillary artery, there were several large veins in the proximity. The arm was repositioned and an angle achieved to access the Axillary artery. Using ultrasound guidance, the introducer needle was advanced into the vessel with weakly pulsatile flow, the guidewire was advanced without resistance. The needle was removed and the catheter then advanced over the wire. The wire was removed. The transducer was connected which did not confirm proper arterial placement. The catheter was then removed and a pressure dressing applied to the access site.     At this point an axillary arterial line was aborted and focus was brought to the Left groin. The Left groin was then prepped and draped in sterile fashion. Using ultrasound guidance, the introducer needle was inserted into the Left common femoral artery with return of strong pulsatile flow. A guidewire was advanced through the introducer needle which was then withdrawn. The arterial catheter was advanced over the wire, after which the wire was removed. An arterial waveform was confirmed on the monitor. The catheter was sutured to the skin and a sterile dressing was applied. The patient tolerated the procedure without any hemodynamic compromise. Dr. Sheikh was available for assistance during the entire procedure.    Mabel Avendano DO  General Surgery PGY2

## 2021-08-20 NOTE — PROGRESS NOTES
SPIRITUAL HEALTH SERVICES  SPIRITUAL ASSESSMENT Progress Note  [facility] 4A     REFERRAL SOURCE: Patient request at admission; follow up re: pre-surg request      Met with Berta and daughter Karin at bedside to introduce myself and assess  request in Fr. Nenaian's absence. Berta was awake but somewhat disoriented; Karin and I met outside patient's room for further conversation.     I provided empathetic listening while Karin continued to process news of Berta's transplant not proceeding as hoped. Karin and Berta are very close, per her report. Given their relationship and Berta's current clinical condition, I encouraged Karin to play music that she and Berta enjoy listening to together, which Karin felt would be something that could be helpful for Berta.    Karin also reported that it was likely her father/Berta's spouse Demar who placed request for , and that he would be returning around 3 pm this afternoon    PLAN: I will reattempt later today and will continue to follow. Spiritual Health Services remains available for patient, family, and staff support.     Please page the on call  either via [employer] (Spiritual Health/[facility]) or by placing a STAT or ASAP Epic referral for Spiritual Health (which will roll to the on call pager).        Nini Page  Chaplain Resident  Pager: [phone]

## 2021-08-20 NOTE — PROGRESS NOTES
Transplant Surgery  Inpatient Daily Progress Note  08/20/2021    Assessment & Plan: 55 year old female with 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 Transplant aborted due to episode of VT requiring cardioversion and hypotension requiring pressors. Patient transferred to SICU for hemodynamic monitoring. Cardiology consulted.    Cardiorespiratory: Management per SICU  Hypotension secondary to shock: Now on 3 pressors. Weaning norepinephrine. Volume replacement and antibiotics. MAP goal > 65. Lactic acid 1.3.   Ventricular tachycardia: EP Cards consulting. Continue amiodarone gtt. K > 4 and Mg > 2. Telemetry. Likely not primarily a cardiac issue, but rather sepsis, electrolyte abnormalities. Troponin 0.328, now 0.311.   Post operative respiratory failure: Mech vent. Management per SICU  Hematology: INR 1.51, PLT wnl  Anemia: Hgb 8.9 (9.4)  GI/Nutrition: NPO. PPI  Fluid/Electrolytes: Cr 0.82, UO 15-20. K 4, Mg 2.3  Hypovolemia: Volume replaced with albumin 5%. Replace drain output.   Endocrine: Stress/steroid hyperglycemia. sliding scale insulin every 4 hours  Hydrocortisone 100 mg q8hr.  Neuro:  Sedation, pain control: Propofol, fentanyl  : [place]. Strict I & O  Infectious disease: Tm 100.2, WBC 34.2->31.3.   Septic shock: Fluid gram stain negative. Fluid cx and  blood cx in process. On broad spectrum empiric abx, Meropenem, Vanco, and Micafungin. Consult ID.   Prophylaxis: DVT (mechanical), GI (PPI)  Activity: PT/OT  Disposition: SICU    Medical Decision Making: High  Subsequent visit 97631 (high level decision making)    [place]/Fellow/Resident Provider: Kelly Salas PA-C    Faculty: Ernesto Schmitt M.D.    __________________________________________________________________  Transplant History: Admitted 8/18/2021 for liver transplant.  N/A, Postoperative day:      Interval History:  Stable on vent. Low UO. On 3 pressors.  "Weaning pressor x 1.     ROS:   A 10-point review of systems was negative except as noted above.    Meds:    hydrocortisone sodium succinate PF  100 mg Intravenous Q8H     insulin aspart  1-6 Units Subcutaneous Q4H     lactated ringers  250 mL Intravenous Once     meropenem  1 g Intravenous Q8H     micafungin  100 mg Intravenous Q24H     pantoprazole (PROTONIX) IV  40 mg Intravenous Daily with breakfast     vancomycin  1,250 mg Intravenous Q18H       Physical Exam:     Admit Weight: 59.5 kg (131 lb 2.8 oz)    Current vitals:   /66 (BP Location: Left arm)   Pulse 58   Temp 98.2  F (36.8  C)   Resp 16   Ht 1.651 m (5' 5\")   Wt 63.1 kg (139 lb 1.8 oz)   SpO2 97%   BMI 23.15 kg/m        Vital sign ranges:    Temp:  [95  F (35  C)-100.2  F (37.9  C)] 98.2  F (36.8  C)  Pulse:  [58-98] 58  Resp:  [12-25] 16  BP: ()/(48-76) 119/66  MAP:  [1 mmHg-233 mmHg] 68 mmHg  Arterial Line BP: ()/(1-230) 107/48  FiO2 (%):  [45 %-60 %] 45 %  SpO2:  [93 %-100 %] 97 %  Patient Vitals for the past 24 hrs:   BP Temp Temp src Pulse Resp SpO2 Height Weight   08/20/21 1330 -- 98.2  F (36.8  C) -- 58 -- 97 % -- --   08/20/21 1300 -- 98.4  F (36.9  C) -- 59 -- 98 % -- --   08/20/21 1230 -- 98.4  F (36.9  C) -- 59 -- 97 % -- --   08/20/21 1215 -- 98.4  F (36.9  C) -- 62 -- 98 % -- --   08/20/21 1200 -- 98.4  F (36.9  C) Bladder 60 16 98 % -- --   08/20/21 1145 -- 98.2  F (36.8  C) -- 60 -- 97 % -- --   08/20/21 1130 -- 98.1  F (36.7  C) -- 62 -- 98 % -- --   08/20/21 1115 -- 98.1  F (36.7  C) -- 62 -- 97 % -- --   08/20/21 1100 -- 98.1  F (36.7  C) -- 61 16 97 % -- --   08/20/21 1015 -- -- -- 63 -- 97 % -- --   08/20/21 1000 -- 98.1  F (36.7  C) -- 63 16 97 % -- --   08/20/21 0945 -- -- -- 62 -- 97 % -- --   08/20/21 0930 -- -- -- 64 -- 97 % -- --   08/20/21 0915 -- 98.4  F (36.9  C) -- 63 -- 97 % -- --   08/20/21 0900 -- 98.4  F (36.9  C) -- 65 16 97 % -- --   08/20/21 0845 -- 98.6  F (37  C) -- 66 -- 97 % -- -- "   08/20/21 0830 -- 98.6  F (37  C) -- 67 -- 98 % -- --   08/20/21 0815 -- 98.6  F (37  C) -- 67 -- 97 % -- --   08/20/21 0800 -- 98.6  F (37  C) Bladder 67 14 98 % -- --   08/20/21 0730 -- 98.8  F (37.1  C) -- 68 -- 97 % -- --   08/20/21 0715 -- 99  F (37.2  C) -- 69 -- 97 % -- --   08/20/21 0700 -- 99  F (37.2  C) -- 68 -- 97 % -- --   08/20/21 0645 -- 99  F (37.2  C) -- 71 -- 98 % -- --   08/20/21 0630 -- 99  F (37.2  C) -- 72 -- 98 % -- --   08/20/21 0615 -- 99  F (37.2  C) -- 73 -- 98 % -- --   08/20/21 0600 -- 99  F (37.2  C) -- 72 -- 97 % -- --   08/20/21 0545 -- 99  F (37.2  C) -- 74 -- 97 % -- --   08/20/21 0530 -- 99  F (37.2  C) -- 73 -- 98 % -- --   08/20/21 0515 -- 99.1  F (37.3  C) -- 76 -- 97 % -- --   08/20/21 0500 -- 99.1  F (37.3  C) -- 77 15 97 % -- --   08/20/21 0445 -- 99.3  F (37.4  C) -- 77 -- 97 % -- --   08/20/21 0430 -- 99.3  F (37.4  C) -- 75 -- 100 % -- --   08/20/21 0415 -- 99.3  F (37.4  C) -- 78 -- 98 % -- --   08/20/21 0400 119/66 99.3  F (37.4  C) Bladder 80 14 97 % -- --   08/20/21 0345 -- 99.3  F (37.4  C) -- 77 -- 97 % -- 63.1 kg (139 lb 1.8 oz)   08/20/21 0330 -- 99.3  F (37.4  C) -- 78 -- 97 % -- --   08/20/21 0315 -- 99.5  F (37.5  C) -- 78 -- 96 % -- --   08/20/21 0300 122/65 99.7  F (37.6  C) -- 78 16 96 % -- --   08/20/21 0245 -- 99.7  F (37.6  C) -- 77 -- 96 % -- --   08/20/21 0232 -- -- -- -- -- 98 % -- --   08/20/21 0230 -- 99.9  F (37.7  C) -- 79 -- 100 % -- --   08/20/21 0225 -- 99.9  F (37.7  C) -- 80 -- 97 % -- --   08/20/21 0220 126/76 99.9  F (37.7  C) -- 80 -- 97 % -- --   08/20/21 0215 126/76 99.9  F (37.7  C) -- 79 -- 97 % -- --   08/20/21 0210 121/59 99.9  F (37.7  C) -- 77 -- 97 % -- --   08/20/21 0205 -- 99.9  F (37.7  C) -- 80 -- 97 % -- --   08/20/21 0200 125/65 99.9  F (37.7  C) -- 80 15 97 % -- --   08/20/21 0155 -- 99.9  F (37.7  C) -- 80 -- 97 % -- --   08/20/21 0150 134/65 99.9  F (37.7  C) -- 81 -- 97 % -- --   08/20/21 0145 131/64 99.9  F (37.7  C) --  82 -- 97 % -- --   08/20/21 0140 133/68 99.9  F (37.7  C) -- 84 -- 97 % -- --   08/20/21 0135 133/66 99.9  F (37.7  C) -- 83 -- 97 % -- --   08/20/21 0130 135/65 99.9  F (37.7  C) -- 83 -- 97 % -- --   08/20/21 0125 129/69 99.9  F (37.7  C) -- 82 -- 97 % -- --   08/20/21 0120 136/68 99.9  F (37.7  C) -- 82 -- 98 % -- --   08/20/21 0115 133/65 99.9  F (37.7  C) -- 81 -- 97 % -- --   08/20/21 0110 114/61 99.9  F (37.7  C) -- 80 -- 97 % -- --   08/20/21 0105 119/62 99.9  F (37.7  C) -- 79 -- 97 % -- --   08/20/21 0100 116/64 99.9  F (37.7  C) -- 79 15 97 % -- --   08/20/21 0055 118/65 99.7  F (37.6  C) -- 79 -- 97 % -- --   08/20/21 0050 118/65 99.7  F (37.6  C) -- 79 -- 96 % -- --   08/20/21 0045 114/62 99.7  F (37.6  C) -- 80 -- 97 % -- --   08/20/21 0040 -- 99.7  F (37.6  C) -- 79 -- 97 % -- --   08/20/21 0035 -- 99.7  F (37.6  C) -- 79 -- 97 % -- --   08/20/21 0030 108/54 99.5  F (37.5  C) -- 80 -- 97 % -- --   08/20/21 0025 -- 99.5  F (37.5  C) -- 79 -- 96 % -- --   08/20/21 0020 -- 99.5  F (37.5  C) -- 80 -- 97 % -- --   08/20/21 0015 104/59 99.5  F (37.5  C) -- 80 -- 97 % -- --   08/20/21 0010 -- 99.5  F (37.5  C) -- 80 -- 97 % -- --   08/20/21 0005 -- 99.3  F (37.4  C) -- 80 -- 97 % -- --   08/20/21 0000 109/58 99.3  F (37.4  C) Bladder 79 15 97 % -- --   08/19/21 2345 105/59 99.1  F (37.3  C) -- 79 -- 98 % -- --   08/19/21 2330 103/54 99.1  F (37.3  C) -- 80 -- 97 % -- --   08/19/21 2315 94/52 99.1  F (37.3  C) -- 80 -- 96 % -- --   08/19/21 2300 95/52 99.1  F (37.3  C) -- 80 -- 97 % -- --   08/19/21 2245 97/53 99.3  F (37.4  C) -- 81 -- 97 % -- --   08/19/21 2230 92/50 99.3  F (37.4  C) -- 81 -- 97 % -- --   08/19/21 2215 (!) 82/48 99.3  F (37.4  C) -- 81 -- 97 % -- --   08/19/21 2200 90/51 99.3  F (37.4  C) -- 83 -- 98 % -- --   08/19/21 2151 -- -- -- -- -- 96 % -- --   08/19/21 2145 95/53 99.3  F (37.4  C) -- 83 -- 96 % -- --   08/19/21 2130 97/54 99.3  F (37.4  C) -- 85 -- 96 % -- --   08/19/21 2115 97/53  "99.3  F (37.4  C) -- 83 -- 96 % -- --   08/19/21 2100 97/56 99.3  F (37.4  C) -- 85 -- 96 % -- --   08/19/21 2045 92/54 99.3  F (37.4  C) -- 85 -- 96 % -- --   08/19/21 2030 111/58 99.3  F (37.4  C) -- 96 -- 97 % -- --   08/19/21 2015 98/55 99.5  F (37.5  C) -- 89 -- 96 % -- --   08/19/21 2000 99/59 99.7  F (37.6  C) Bladder 96 22 98 % -- --   08/19/21 1945 97/54 99.9  F (37.7  C) -- 91 -- 97 % -- --   08/19/21 1930 98/52 99.9  F (37.7  C) -- 92 -- 97 % -- --   08/19/21 1915 99/54 100  F (37.8  C) -- 94 -- 97 % -- --   08/19/21 1900 99/56 100.2  F (37.9  C) -- 93 -- 98 % -- --   08/19/21 1830 115/60 99.9  F (37.7  C) -- 98 23 -- -- --   08/19/21 1815 110/54 99.7  F (37.6  C) -- 96 25 97 % -- --   08/19/21 1800 111/55 99.3  F (37.4  C) -- 97 -- 93 % -- --   08/19/21 1745 111/59 99  F (37.2  C) -- 98 -- 96 % -- --   08/19/21 1730 -- 98.4  F (36.9  C) -- 93 -- 97 % -- --   08/19/21 1726 -- -- -- 96 -- 97 % -- --   08/19/21 1715 -- 98.1  F (36.7  C) -- 96 -- 97 % -- --   08/19/21 1700 -- 97.5  F (36.4  C) -- 96 -- 97 % -- --   08/19/21 1657 -- -- -- 93 -- 97 % -- --   08/19/21 1645 -- 97  F (36.1  C) -- 92 -- 97 % -- --   08/19/21 1642 -- -- -- 92 -- 97 % -- --   08/19/21 1630 -- (!) 96.6  F (35.9  C) -- 90 -- 97 % -- --   08/19/21 1615 -- (!) 96.1  F (35.6  C) -- 90 12 97 % 1.651 m (5' 5\") --   08/19/21 1600 -- (!) 95.5  F (35.3  C) -- 90 12 98 % -- --   08/19/21 1545 -- (!) 95.4  F (35.2  C) -- 89 12 98 % -- --   08/19/21 1530 -- (!) 95  F (35  C) -- 87 12 97 % -- --   08/19/21 1515 -- (!) 95  F (35  C) -- 88 -- 97 % -- --   08/19/21 1500 131/75 (!) 95  F (35  C) Bladder 90 -- 98 % -- --   08/19/21 1445 -- -- -- 90 -- 97 % -- --   08/19/21 1430 -- -- -- 84 12 97 % -- --   08/19/21 1415 -- -- -- 89 -- 96 % -- --     General Appearance: NAD   Skin: no rash  Heart: NSR, PACs  Lungs: vent  Abdomen: soft, chevron incision surgical dressing, shadowing. ROMA, serous output.  : ferguson is present.    Extremities: no " edema  Neurologic: sedated     Data:   CMP  Recent Labs   Lab 08/20/21  1148 08/20/21  1143 08/20/21  0859 08/20/21  0849 08/20/21  0418 08/19/21  1424 08/19/21  1316 08/19/21  1222 08/18/21  2132   NA  --   --   --  135 133 128* 133 130* 126*   POTASSIUM  --   --   --  4.0 4.0 3.0* 3.5 3.9 3.9   CHLORIDE  --   --   --  106 104 98  --   --  98   CO2  --   --   --  18* 14* 17*  --   --  20   *  --  147* 176* 199* 129* 128* 122* 100*   BUN  --   --   --  25 24 18  --   --  28   CR  --   --   --  0.82 0.78 0.47*  --   --  0.65   GFRESTIMATED  --   --   --  80 85 >90  --   --  >90   MARIELENA  --   --   --  8.7 9.4 8.0*  --   --  8.6   ICAW  --   --   --   --   --   --  4.6 4.7  --    MAG  --   --   --   --  2.3 2.2  --   --  2.0   PHOS  --   --   --   --  4.6* 3.6  --   --  3.7   AMYLASE  --   --   --   --   --   --   --   --  68   ALBUMIN  --  4.2  --  3.9  --  3.0*  --   --  2.0*   BILITOTAL  --  4.9*  --  5.1*  --  3.8*  --   --  3.6*   ALKPHOS  --  174*  --  196*  --  276*  --   --  400*   AST  --  49*  --  59*  --  51*  --   --  66*   ALT  --  26  --  28  --  27  --   --  36     CBC  Recent Labs   Lab 08/20/21  1143 08/20/21  0849 08/20/21  0419 08/19/21  2347   HGB 8.9* 9.4* 10.3*  10.3* 10.2*  10.2*   WBC  --   --  31.3* 34.2*   PLT  --   --  405 405     COAGS  Recent Labs   Lab 08/19/21  1839 08/19/21  1424 08/18/21  2132   INR 1.51* 1.61* 1.45*   PTT  --   --  30      Urinalysis  Recent Labs   Lab Test 08/19/21  2135 08/19/21  0513 05/21/21  0730   COLOR Yellow Dark Yellow* Dark Yellow   APPEARANCE Slightly Cloudy* Clear Clear   URINEGLC Negative Negative Negative   URINEBILI Small* Small* Negative   URINEKETONE Negative Negative Negative   SG 1.025 1.019 1.031   UBLD Trace* Negative Negative   URINEPH 5.0 5.5 6.0   PROTEIN 10 * Negative 20*   NITRITE Negative Negative Negative   LEUKEST Negative Negative Negative   RBCU 6* <1 1   WBCU 2 1 4   UTPG  --   --  0.26*

## 2021-08-20 NOTE — PROGRESS NOTES
SURGICAL ICU PROGRESS NOTE  August 20, 2021                 Critical Care Services Progress Note:     Berta Nava remains critically ill with hemodynamic instability and respiratory insufficiency after attempted liver transplantation required by sclerosing cholangitis.  At the time of this visit she is on 4 vasoactive drugs.  Severe distributive shock.  I personally examined and evaluated the patient today.   The patient s prognosis today is guarded in light of her advanced requirement for hemodynamic support and respiratory care.  I have evaluated all laboratory values and imaging studies from the past 24 hours.  Key findings and decisions made today included we will volume expand the patient in an attempt to wean her from vasoactive drugs.  Extubation is not anticipated.    I personally managed the mechanical ventilation, fluids and nutrition as well as vasoactive drugs.  Consults ongoing and ordered are Transplant Services, Pharmacy, Nutrition and Cardiology.    Procedures that will happen today are: Ongoing mechanical ventilation.  All treatments were placed at my direction.  I formulated today s plan with Dr. Keene and the house staff team or resident(s) and agree with the findings and plan in the associated note.       The above plans and care have been discussed with family members at the bedside and all questions and concerns were addressed.     I spent a total of 40 minutes (excluding procedure time) personally providing and directing critical care services at the bedside and on the critical care unit for Berta Nava.        Henry Cuba MD      ASSESSMENT:   Berta Nava is a 55 year old female with 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 She presented in stable condition from home for potential liver transplant. Patient was taken to the OR. Intraop, abdomen was opened, ascites was evacuated and  falciform and left triangular ligament divided. At this time, patient developed vtach requiring 5-6 rounds of debrillation and the procedure was aborted. Consideration for starting ECMO, but patient converted out. Patient incisions closed. Taken to CT for concern for PE. CT showed no signs of PE. Comes to SICU  due to unstable hemodynamics on 3 pressors. Overnight, angiotensin II was added due to hypotension.      TODAY'S PROGRESS/PLANS:      Neuro/Pain/Sedation:  #Post operative sedation and pain control   RASS at -4 on exam this AM. Does not awake to voice or follow commands. GCS 6  -Sedation: Propofol gtt, wean as able  -Pain: Fentanyl gtt  -Monitor neurological status. Notify the MD/DO for any acute changes in exam.    Pulmonary:  #Post operative respiratory failure   #Intubated and mechanically ventilated   -Mechanically Ventilated AC 12/500/5/40%   -AB.31/101/30/15              metabolic acidosis with respiratory alkalosis               Increased rate 12 to 16 and decreased TV to 400   -Repeat ABG-7.33/115/35/18               Metabolic acidosis-will continue fluid resuscitation      Cardiovascular:  #Intraoperative V tach requiring 4 rounds of defibrillation   #Distributive shock   -Patient had wnl stress ECHO pre-op with EF 60-65%  -Intraoperative v tach requiring defibrillation, came to SICU on 3 pressors             NE, Epi, Vaso  -Stat TTE post op : Global and regional left ventricular function is normal with an EF of 60-65%. Global right ventricular function is normal. No Effusion  -post VT EKG wnl   -Patient now on 4 pressors               Angiotensin II, NE, Epi, Vaso              Weaning now to 5 Ang II, NE 0.12m Vaso 2.4   - MAP >65  -Will continue fluid resuscitation with albumin, replacing ROMA output 1:1 Q3hr  -Replete potassium and magnesium   -Continue amiodarone 1 mg/ml  -EP consult               Obtain cardiac MRI---wait on this now   -Kincheloe removed    -Lactate peak 4.8 on  - downtrend to 4.6  -Troponin peak at 0.328 - downtrend to 0.325      GI/Nutrition:   # Hepatic Cirrhosis Secondary to PSC/UC  -NPO  -NG confirmed  -Incision minimal oozing on dressings   -ROMA drain x1-labile output 1000 ml overnight, IL this AM   -Will trend LFT      Renal/Fluids/Electrolytes:  #Distributive Shock   #Oliguria   -Patient with labile blood pressures, stabilized after adding angiotensin II, now on 4 pressors-likely distributive shock  -250 bolus of LR, 500 albumin   -Changed 1:1 drain replacement with albumin to Q3 due to increased output   -Electrolytes wnl   -No GONZALO- BUN 24, Cr 0.87   -Oliguric with 15-25 out per hour [net 207 this AM]  -Continue with volume support/expansion      Endocrine:  -B-199  - Add sliding scale insulin     ID/Antibiotics:  -Afebrile-T max 99   -WBC 31.4 (34.2)  -Septic appearing---started Meropenem, Micafungin, Vancomycin  -MRSA Swab:  -OR fluid cultures pending   -Blood cultures pending   -Sputum cultures pending   -UA with trace blood, mucus, hyaline casts-volume depletion   -EBV IgG positive but IgM not reactive      Heme:   #Acute blood loss anemia    -EBL= less than 100 ml    -Transfusion: 3 units intraoperative   -Goals: Hb > 8, Fibrinogen >200, INR < 2, plts > 50   -Hb 10.3 (10.2)  -Continue to trend Hb   -plts 405 (405)   -INR 1.61      Prophylaxis:  -DVT: mechanical SCD  -Added PPI      MSK:  -PT and OT to be consulted once patient is less sedated      Lines/ tubes/ drains:  ETT, Peripheral IV x3, arterial line, RIJ MAC, NG, ferguson, JPx1     Disposition:  -Surgical ICU    Patient seen and discussed with staff, Dr. Gonsalez and Dr. Cuba.    Nereyda Keene MD  General Surgery    ====================================    TODAY'S SUBJECTIVE/INTERVAL HISTORY:   Patient intubated and sedated. Does not awaken to voice or follow commands. Looked septic appearing-warm and clammy per overnight sign out. Pressor requirements increased to 4 overnight with addition of  Angiotensin II. Continues to have labile drain output with 1L out yesterday and 1L out this AM. Continue to replace drain outputs. Patient seems to respond well to volume repletion and pressor requirements have come down.    OBJECTIVE:     Temp:  [95  F (35  C)-100.2  F (37.9  C)] 98.2  F (36.8  C)  Pulse:  [57-98] 62  Resp:  [12-25] 16  BP: ()/(48-76) 119/66  MAP:  [1 mmHg-233 mmHg] 81 mmHg  Arterial Line BP: ()/(1-230) 122/60  FiO2 (%):  [45 %-60 %] 45 %  SpO2:  [93 %-100 %] 98 %  Ventilation Mode: CMV/AC  (Continuous Mandatory Ventilation/ Assist Control)  FiO2 (%): 45 %  Rate Set (breaths/minute): 16 breaths/min  Tidal Volume Set (mL): 400 mL  PEEP (cm H2O): 5 cmH2O  Oxygen Concentration (%): 45 %  Peak Inspiratory Pressure (cm H2O) (Book&Tableer Amberly): 16  Resp: 16      I/O last 3 completed shifts:  In: 6814.8 [I.V.:4414.8]  Out: 4132 [Urine:1177; Emesis/NG output:450; Drains:2405; Blood:100]    General: Sedated, no arousal to voice   Neuro: Sedated with RASS -4  Resp: Intubated and mechanically ventilated, chest rise symmetric, not overbreathing the vent   CV: RRR  Abdomen: x1 ROMA drain with increased output   Incisions: dressed with minimal output   Renal: Oliguric, continues volume repletion   Extremities: warm and well perfused      LABS:   Arterial Blood Gases   Recent Labs   Lab 08/20/21  0851 08/20/21  0421 08/19/21  2347 08/19/21  1838   PH 7.33* 7.31* 7.32* 7.34*   PCO2 35 30* 29* 33*   PO2 115* 101 90 102   HCO3 18* 15* 15* 18*     Complete Blood Count   Recent Labs   Lab 08/20/21  1143 08/20/21  0849 08/20/21  0419 08/19/21  2347 08/19/21  1839 08/19/21  1424   WBC  --   --  31.3* 34.2* 42.3* 29.2*   HGB 8.9* 9.4* 10.3*  10.3* 10.2*  10.2* 11.2* 11.1*   PLT  --   --  405 405 466* 422     Basic Metabolic Panel  Recent Labs   Lab 08/20/21  1148 08/20/21  0859 08/20/21  0849 08/20/21  0418 08/20/21  0017 08/19/21  1839   NA  --   --  135 133 134 133   POTASSIUM  --   --  4.0 4.0 3.7 4.5    CHLORIDE  --   --  106 104 104 103   CO2  --   --  18* 14* 17* 17*   BUN  --   --  25 24 22 19   CR  --   --  0.82 0.78 0.70 0.57   * 147* 176* 199* 164* 141*     Liver Function Tests  Recent Labs   Lab 08/20/21  1143 08/20/21  0849 08/19/21  1839 08/19/21  1424 08/18/21  2132   AST 49* 59*  --  51* 66*   ALT 26 28  --  27 36   ALKPHOS 174* 196*  --  276* 400*   BILITOTAL 4.9* 5.1*  --  3.8* 3.6*   ALBUMIN 4.2 3.9  --  3.0* 2.0*   INR  --   --  1.51* 1.61* 1.45*     Pancreatic Enzymes  Recent Labs   Lab 08/18/21  2132   AMYLASE 68     Coagulation Profile  Recent Labs   Lab 08/19/21  1839 08/19/21  1424 08/18/21  2132   INR 1.51* 1.61* 1.45*   PTT  --   --  30         IMAGING:   Recent Results (from the past 24 hour(s))   XR Abdomen Port 1 View    Narrative    EXAM: XR ABDOMEN PORT 1 VIEWS  8/19/2021 3:05 PM      HISTORY: NG placement    COMPARISON: Abdominal radiograph 8/19/2022    FINDINGS: Single supine abdominal radiograph. Interval repositioning  of the nasogastric tube, tip and sidehole projecting over the stomach.  Three biliary stents in stable position projecting over the right  upper quadrant. Surgical drain projecting over the right upper  quadrant. Midline abdominal staples. Partially visualized [place]  catheter. Multiple borderline dilated loops of air-filled small bowel  measuring up to 2.8 cm, stable. No pneumatosis or portal venous gas.  Visualized portions of the lung demonstrate no focal airspace  opacities.       Impression    IMPRESSION:  1. Interval repositioning of the nasogastric tube, tip and sidehole  projecting over the stomach.  2. Multiple borderline dilated air-filled loops of small bowel,  nonspecific. Attention on follow-up.  3. Additional lines and tubes in position.    I have personally reviewed the examination and initial interpretation  and I agree with the findings.    ELIZABETH LAMB MD         SYSTEM ID:  [id number]   XR Chest Port 1 View    Impression    RESIDENT  PRELIMINARY INTERPRETATION  IMPRESSION:   1. Endotracheal tube now projecting 3.5 cm superior to the caryn.  2. Right internal jugular [place] catheter projecting over the right  ventricular outflow tract, consider advancement.  3. Perihilar opacities favoring atelectasis with improved aeration of  the lung bases.   XR Chest Port 1 View    Narrative    EXAM: XR CHEST PORT 1 VIEW  8/20/2021 9:08 AM     HISTORY:  eval for PNA       COMPARISON:  Chest radiograph 8/19/2021    FINDINGS:   AP portable chest. Endotracheal tube is minimally retracted with  distal tip approximately 5.8 cm cephalad to the caryn. 2 right IJ  catheters are seen with distal tips projecting over the proximal and  distal SVC. Enteric tube is seen with sidehole projecting over the  left upper quadrant with distal tip projecting outside the  field-of-view. Multiple biliary stents are partially visualized over  the right upper quadrant.    Trachea is midline. Improved aeration of the bilateral lung fields  with decreased perihilar opacities. No new focal consolidative  opacities. No pleural effusion or pneumothorax. Stable  cardiomediastinal silhouette. Bones are grossly intact.      Impression    IMPRESSION:   1. Endotracheal tube now projects approximately 5.8 cm cephalad to the  caryn.  2. Decreased perihilar opacities, favoring atelectasis. No new focal  consolidative opacities.    I have personally reviewed the examination and initial interpretation  and I agree with the findings.    ASHLY AYERS MD         SYSTEM ID:  [id number]

## 2021-08-20 NOTE — H&P (VIEW-ONLY)
[facility] INFECTIOUS DISEASES CONSULTATION     Patient:  Berta Nava   Date of birth 1965, Medical record number [id number]  Date of Visit:  08/20/2021  Date of Admission: 8/18/2021  Consult Requester:Ernesto Schmitt MD          Assessment and Recommendations:   RECOMMENDATION:  1. Discontinue Micafungin with low suspicion of candidal infection/ does not appear to have had candida in ascites in past.   2. MRSA nares negative, if continues to be improving/stable and BCx without GPCs, would discontinue Vanco tomorrow.  3. Continue meropenem 1g q8hrs at this time while we await results of infectious work up.   4. Please draw blood cultures if patient spikes fevers >100.4.     Infectious diseases will follow peripherally over the weekend. Dr. Anton and DENISSE Ardon will be covering the General ID services over the weekend. Dr. Osborne continue weekday coverage of the [place] ID service on Monday.      ASSESSMENT:  Berta Nava is a 55 year old female with PMHx significant for PCS/UC (on mesalamine) c/b hepatic cirrhosis, ascites, biliary strictures and choledocholithiasis, issues with recurrent cholangitis, h/o S mitis bacteremia and liver abscess (4/2021) who presented to the hospital in normal state of health for liver transplantation. Unfortunately, developed ventricular tachycardia and hemodynamic instability during procedure requiring defibrillation and admission to ICU. ID consulted to evaluate for septic component of shock.    WBC elevated on admission to 15, note that her WBC count has been largely stable in 11-18 range. UA 8/19 with trace blood but otherwise unremarkable. BCx 8/19 NGTD. CRP was 40. Post procedurally WBC spiked to 40s then trended to mid 30s. Lactate debbie to 4 with improvement to normal. COVID PCR negative. Peritoneal fluid abd Cx 8/19 NGTD. SCx 8/19 ET tube NGTD. MRSA nares sent 8/20. CT PE protocol without e/o PE, tree in bud nodularity of MINI and LLL and L middle ribs,  attn to follow up, 5 mm pulm nodule in RUL.     Patient started on Zosyn+Vanc+fluconazole but switched to meropenem + micafungin+ vanc now.     Improved status of patient over the past few hours. Decreased pressor needs, although still onboard. Vent settings stable. Broad infectious work up sent by SICU. Uncertain if infection lead to patient decompensation during surgical procedure, will await infectious work up and cover on antibiotics at this time.     Thank you for this consult. ID will continue to follow.     Patient was discussed with Dr. Osborne.     Reva Clemente PA-C  Infectious Diseases  Pager #[phone]          History of Present Illness:   Berta Nava is a 55 year old female with PMHx significant for PCS/UC (on mesalamine) c/b hepatic cirrhosis, ascites, biliary strictures and choledocholithiasis, issues with recurrent cholangitis, h/o S mitis bacteremia and liver abscess (4/2021) who presented to the hospital in normal state of health for liver transplantation. Unfortunately, developed ventricular tachycardia and hemodynamic instability during procedure requiring defibrillation and admission to ICU. ID consulted to evaluate for septic component of shock.    Per chart review, patient was in her normal state of health on admission and denied recent illnesses. She was getting weekly paracenteses, last was 8/18. Last ERCP ~ 1 month ago with intrahepatic stents placed. Patient went to the OR 8/29 for liver transplantation however the procedure was aborted when patient developed VT and hemodynamic. Received 4 rounds of defibrillation and placed on 3 pressors. Admitted to ICU for ongoing care.     Lives in [place] with  and dog. Works in IT. Outdoor activities including gardening. Has traveled to [facility] and [place] in the past. No institutionalization or TB exposure. No smoking, ETOH, or illicit drug use.    Past Infx Hx:   -S mitis SBP and BSI 4/2021 s/p prolonged IV abx for possible  liver abscess (-6/11, then PO Augmentin through 6/16).   -5/21 &6/11/21 indeterminate quant gold          Review of Systems:   -Unable to collect as patient is intubated and sedated.          Past Medical History:     Past Medical History:   Diagnosis Date     Ascites      Biliary cirrhosis (H)      Cholangitis, sclerosing      Cirrhosis of liver with ascites (H) 3/3/2021     Hearing loss of left ear     wears a hearing aide     History of low potassium      Hyperlipidemia      Hypertension      SBP (spontaneous bacterial peritonitis) (H) 4/30/2021     Sjogren's syndrome (H)      Ulcerative colitis (H)      Ulcerative pancolitis (H)             Past Surgical History:     Past Surgical History:   Procedure Laterality Date     ENDOSCOPIC RETROGRADE CHOLANGIOPANCREATOGRAM N/A 6/25/2021    Procedure: ENDOSCOPIC RETROGRADE CHOLANGIOPANCREATOGRAPHY with ballon sweep of bile ducts for stones, balloon dilation of bile ducts and bile duct stent placement;  Surgeon: Gregory Gabriel MD;  Location: [facility] OR     ENDOSCOPIC RETROGRADE CHOLANGIOPANCREATOGRAM N/A 7/22/2021    Procedure: ENDOSCOPIC RETROGRADE CHOLANGIOPANCREATOGRAPHY STONE REMOVAL, DILATION AND STENT PLACEMENT;  Surgeon: Gregory Gabriel MD;  Location: [facility] OR     ENDOSCOPIC RETROGRADE CHOLANGIOPANCREATOGRAPHY, EXCHANGE TUBE/STENT N/A 05/05/2021    Procedure: ENDOSCOPIC RETROGRADE CHOLANGIOPANCREATOGRAPHY WITH STENT EXCHANGE, STONE EXTRACTION, AND DILATION;  Surgeon: Gregory Gabriel MD;  Location: [facility] OR     ENDOSCOPIC RETROGRADE CHOLANGIOPANCREATOGRAPHY, EXCHANGE TUBE/STENT N/A 5/10/2021    Procedure: ENDOSCOPIC RETROGRADE CHOLANGIOPANCREATOGRAPHY with biliary dilation, stone removal, stent exchange;  Surgeon: Gregory Gabriel MD;  Location: [facility] OR     ENDOSCOPIC RETROGRADE CHOLANGIOPANCREATOGRAPHY, EXCHANGE TUBE/STENT N/A 6/10/2021    Procedure: ENDOSCOPIC RETROGRADE CHOLANGIOPANCREATOGRAPHY, WITH biliary stent exchange, stone removal;  Surgeon: Woodrow Lott MD;   Location: [facility] OR     GYN SURGERY      bilat fallopian tubes and ovaries removed     PICC DOUBLE LUMEN PLACEMENT Right 2021    42cm (2cm external), Lateral brachial vein     TRANSPLANT LIVER RECIPIENT  DONOR N/A 2021    Procedure: Opening of abdomen, Abdominal Exploration and aborted liver transplant.;  Surgeon: Ernesto Schmitt MD;  Location: [facility] OR            Family History:     Family History   Problem Relation Age of Onset     Cancer Mother         angiosarcoma     Coronary Artery Disease Early Onset Father         MI age 46     Heart Transplant Father      Liver Disease No family hx of      Ulcerative Colitis No family hx of      Crohn's Disease No family hx of             Social History:     Social History     Tobacco Use     Smoking status: Never Smoker     Smokeless tobacco: Never Used   Substance Use Topics     Alcohol use: No     Comment: Last drink was      History   Sexual Activity     Sexual activity: Not on file            Current Medications:       hydrocortisone sodium succinate PF  100 mg Intravenous Q8H     insulin aspart  1-6 Units Subcutaneous Q4H     lactated ringers  250 mL Intravenous Once     meropenem  1 g Intravenous Q8H     micafungin  100 mg Intravenous Q24H     pantoprazole (PROTONIX) IV  40 mg Intravenous Daily with breakfast     vancomycin  1,250 mg Intravenous Q18H            Allergies:     Allergies   Allergen Reactions     Diagnostic X-Ray Materials Hives     PATIENT HAD HIVE REACTION AFTER ADMINISTERING CT CONTRAST DYE.       Contrast Dye             Physical Exam:   Vitals were reviewed  Patient Vitals for the past 24 hrs:   BP Temp Temp src Pulse Resp SpO2 Height Weight   21 1300 -- 98.4  F (36.9  C) -- 59 -- 98 % -- --   21 1215 -- 98.4  F (36.9  C) -- 62 -- 98 % -- --   21 1200 -- 98.4  F (36.9  C) Bladder 60 16 98 % -- --   21 1145 -- 98.2  F (36.8  C) -- 60 -- 97 % -- --   21 1130 -- 98.1  F (36.7  C) -- 62 -- 98 % -- --    08/20/21 1115 -- 98.1  F (36.7  C) -- 62 -- 97 % -- --   08/20/21 1100 -- 98.1  F (36.7  C) -- 61 16 97 % -- --   08/20/21 1015 -- -- -- 63 -- 97 % -- --   08/20/21 1000 -- 98.1  F (36.7  C) -- 63 16 97 % -- --   08/20/21 0945 -- -- -- 62 -- 97 % -- --   08/20/21 0930 -- -- -- 64 -- 97 % -- --   08/20/21 0915 -- 98.4  F (36.9  C) -- 63 -- 97 % -- --   08/20/21 0900 -- 98.4  F (36.9  C) -- 65 16 97 % -- --   08/20/21 0845 -- 98.6  F (37  C) -- 66 -- 97 % -- --   08/20/21 0830 -- 98.6  F (37  C) -- 67 -- 98 % -- --   08/20/21 0815 -- 98.6  F (37  C) -- 67 -- 97 % -- --   08/20/21 0800 -- 98.6  F (37  C) Bladder 67 14 98 % -- --   08/20/21 0730 -- 98.8  F (37.1  C) -- 68 -- 97 % -- --   08/20/21 0715 -- 99  F (37.2  C) -- 69 -- 97 % -- --   08/20/21 0700 -- 99  F (37.2  C) -- 68 -- 97 % -- --   08/20/21 0645 -- 99  F (37.2  C) -- 71 -- 98 % -- --   08/20/21 0630 -- 99  F (37.2  C) -- 72 -- 98 % -- --   08/20/21 0615 -- 99  F (37.2  C) -- 73 -- 98 % -- --   08/20/21 0600 -- 99  F (37.2  C) -- 72 -- 97 % -- --   08/20/21 0545 -- 99  F (37.2  C) -- 74 -- 97 % -- --   08/20/21 0530 -- 99  F (37.2  C) -- 73 -- 98 % -- --   08/20/21 0515 -- 99.1  F (37.3  C) -- 76 -- 97 % -- --   08/20/21 0500 -- 99.1  F (37.3  C) -- 77 15 97 % -- --   08/20/21 0445 -- 99.3  F (37.4  C) -- 77 -- 97 % -- --   08/20/21 0430 -- 99.3  F (37.4  C) -- 75 -- 100 % -- --   08/20/21 0415 -- 99.3  F (37.4  C) -- 78 -- 98 % -- --   08/20/21 0400 119/66 99.3  F (37.4  C) Bladder 80 14 97 % -- --   08/20/21 0345 -- 99.3  F (37.4  C) -- 77 -- 97 % -- 63.1 kg (139 lb 1.8 oz)   08/20/21 0330 -- 99.3  F (37.4  C) -- 78 -- 97 % -- --   08/20/21 0315 -- 99.5  F (37.5  C) -- 78 -- 96 % -- --   08/20/21 0300 122/65 99.7  F (37.6  C) -- 78 16 96 % -- --   08/20/21 0245 -- 99.7  F (37.6  C) -- 77 -- 96 % -- --   08/20/21 0232 -- -- -- -- -- 98 % -- --   08/20/21 0230 -- 99.9  F (37.7  C) -- 79 -- 100 % -- --   08/20/21 0225 -- 99.9  F (37.7  C) -- 80 -- 97 % --  --   08/20/21 0220 126/76 99.9  F (37.7  C) -- 80 -- 97 % -- --   08/20/21 0215 126/76 99.9  F (37.7  C) -- 79 -- 97 % -- --   08/20/21 0210 121/59 99.9  F (37.7  C) -- 77 -- 97 % -- --   08/20/21 0205 -- 99.9  F (37.7  C) -- 80 -- 97 % -- --   08/20/21 0200 125/65 99.9  F (37.7  C) -- 80 15 97 % -- --   08/20/21 0155 -- 99.9  F (37.7  C) -- 80 -- 97 % -- --   08/20/21 0150 134/65 99.9  F (37.7  C) -- 81 -- 97 % -- --   08/20/21 0145 131/64 99.9  F (37.7  C) -- 82 -- 97 % -- --   08/20/21 0140 133/68 99.9  F (37.7  C) -- 84 -- 97 % -- --   08/20/21 0135 133/66 99.9  F (37.7  C) -- 83 -- 97 % -- --   08/20/21 0130 135/65 99.9  F (37.7  C) -- 83 -- 97 % -- --   08/20/21 0125 129/69 99.9  F (37.7  C) -- 82 -- 97 % -- --   08/20/21 0120 136/68 99.9  F (37.7  C) -- 82 -- 98 % -- --   08/20/21 0115 133/65 99.9  F (37.7  C) -- 81 -- 97 % -- --   08/20/21 0110 114/61 99.9  F (37.7  C) -- 80 -- 97 % -- --   08/20/21 0105 119/62 99.9  F (37.7  C) -- 79 -- 97 % -- --   08/20/21 0100 116/64 99.9  F (37.7  C) -- 79 15 97 % -- --   08/20/21 0055 118/65 99.7  F (37.6  C) -- 79 -- 97 % -- --   08/20/21 0050 118/65 99.7  F (37.6  C) -- 79 -- 96 % -- --   08/20/21 0045 114/62 99.7  F (37.6  C) -- 80 -- 97 % -- --   08/20/21 0040 -- 99.7  F (37.6  C) -- 79 -- 97 % -- --   08/20/21 0035 -- 99.7  F (37.6  C) -- 79 -- 97 % -- --   08/20/21 0030 108/54 99.5  F (37.5  C) -- 80 -- 97 % -- --   08/20/21 0025 -- 99.5  F (37.5  C) -- 79 -- 96 % -- --   08/20/21 0020 -- 99.5  F (37.5  C) -- 80 -- 97 % -- --   08/20/21 0015 104/59 99.5  F (37.5  C) -- 80 -- 97 % -- --   08/20/21 0010 -- 99.5  F (37.5  C) -- 80 -- 97 % -- --   08/20/21 0005 -- 99.3  F (37.4  C) -- 80 -- 97 % -- --   08/20/21 0000 109/58 99.3  F (37.4  C) Bladder 79 15 97 % -- --   08/19/21 2345 105/59 99.1  F (37.3  C) -- 79 -- 98 % -- --   08/19/21 2330 103/54 99.1  F (37.3  C) -- 80 -- 97 % -- --   08/19/21 2315 94/52 99.1  F (37.3  C) -- 80 -- 96 % -- --   08/19/21 2300 95/52  "99.1  F (37.3  C) -- 80 -- 97 % -- --   08/19/21 2245 97/53 99.3  F (37.4  C) -- 81 -- 97 % -- --   08/19/21 2230 92/50 99.3  F (37.4  C) -- 81 -- 97 % -- --   08/19/21 2215 (!) 82/48 99.3  F (37.4  C) -- 81 -- 97 % -- --   08/19/21 2200 90/51 99.3  F (37.4  C) -- 83 -- 98 % -- --   08/19/21 2151 -- -- -- -- -- 96 % -- --   08/19/21 2145 95/53 99.3  F (37.4  C) -- 83 -- 96 % -- --   08/19/21 2130 97/54 99.3  F (37.4  C) -- 85 -- 96 % -- --   08/19/21 2115 97/53 99.3  F (37.4  C) -- 83 -- 96 % -- --   08/19/21 2100 97/56 99.3  F (37.4  C) -- 85 -- 96 % -- --   08/19/21 2045 92/54 99.3  F (37.4  C) -- 85 -- 96 % -- --   08/19/21 2030 111/58 99.3  F (37.4  C) -- 96 -- 97 % -- --   08/19/21 2015 98/55 99.5  F (37.5  C) -- 89 -- 96 % -- --   08/19/21 2000 99/59 99.7  F (37.6  C) Bladder 96 22 98 % -- --   08/19/21 1945 97/54 99.9  F (37.7  C) -- 91 -- 97 % -- --   08/19/21 1930 98/52 99.9  F (37.7  C) -- 92 -- 97 % -- --   08/19/21 1915 99/54 100  F (37.8  C) -- 94 -- 97 % -- --   08/19/21 1900 99/56 100.2  F (37.9  C) -- 93 -- 98 % -- --   08/19/21 1830 115/60 99.9  F (37.7  C) -- 98 23 -- -- --   08/19/21 1815 110/54 99.7  F (37.6  C) -- 96 25 97 % -- --   08/19/21 1800 111/55 99.3  F (37.4  C) -- 97 -- 93 % -- --   08/19/21 1745 111/59 99  F (37.2  C) -- 98 -- 96 % -- --   08/19/21 1730 -- 98.4  F (36.9  C) -- 93 -- 97 % -- --   08/19/21 1726 -- -- -- 96 -- 97 % -- --   08/19/21 1715 -- 98.1  F (36.7  C) -- 96 -- 97 % -- --   08/19/21 1700 -- 97.5  F (36.4  C) -- 96 -- 97 % -- --   08/19/21 1657 -- -- -- 93 -- 97 % -- --   08/19/21 1645 -- 97  F (36.1  C) -- 92 -- 97 % -- --   08/19/21 1642 -- -- -- 92 -- 97 % -- --   08/19/21 1630 -- (!) 96.6  F (35.9  C) -- 90 -- 97 % -- --   08/19/21 1615 -- (!) 96.1  F (35.6  C) -- 90 12 97 % 1.651 m (5' 5\") --   08/19/21 1600 -- (!) 95.5  F (35.3  C) -- 90 12 98 % -- --   08/19/21 1545 -- (!) 95.4  F (35.2  C) -- 89 12 98 % -- --   08/19/21 1530 -- (!) 95  F (35  C) -- 87 12 97 % " -- --   08/19/21 1515 -- (!) 95  F (35  C) -- 88 -- 97 % -- --   08/19/21 1500 131/75 (!) 95  F (35  C) Bladder 90 -- 98 % -- --   08/19/21 1445 -- -- -- 90 -- 97 % -- --   08/19/21 1430 -- -- -- 84 12 97 % -- --   08/19/21 1415 -- -- -- 89 -- 96 % -- --   08/19/21 1400 120/69 -- -- 109 -- 99 % -- --       Physical Examination:  Constitutional: Female patient seen lying in bed, intubated and sedated. In NAD.  HEENT: NCAT, MMM.   Respiratory: Non-labored breathing on vent. Lungs are clear to auscultation bilaterally, without wheezing or crackles.  Cardiovascular: RRR.  GI: Normoactive BS. Abdomen is soft, mildly distended.   Skin: Warm and dry. No rashes or lesions on exposed surfaces. Somewhat jaundiced.  Musculoskeletal: Extremities grossly normal. No edema.  Neurologic: Intubated and sedated.   VAD: CVCs c/d/i.          Laboratory Data:     Inflammatory Markers    Recent Labs   Lab Test 08/19/21  0018 06/08/21  1602 06/01/21  0915 05/25/21  1455 05/21/21  1500 05/18/21  1010   CRP 40.0* 23.1* 16.1* 14.3* 16.1* 21.4*       Hematology Studies    Recent Labs   Lab Test 08/20/21  1143 08/20/21  0849 08/20/21  0419 08/19/21  2347 08/19/21  2056 08/19/21  1839 08/19/21  1424 08/18/21  2132 08/16/21  1008 08/16/21  1008 06/21/21  0938 06/12/21  1259 06/10/21  0702 06/09/21  1527 06/01/21  0915 05/12/21  0542 05/11/21  0724   WBC  --   --  31.3* 34.2*  --  42.3* 29.2* 15.1*  --  14.3*   < > 21.3* 22.8* 27.5* 11.5* 14.5* 18.3*   ANEU  --   --   --   --   --   --   --   --   --   --   --  18.5* 22.0* 25.9* 9.3* 11.3* 16.3*   AEOS  --   --   --   --   --   --   --   --   --   --   --  0.0 0.0 0.0 0.2 0.2 0.0   HGB 8.9* 9.4* 10.3*  10.3* 10.2*  10.2* 10.7* 11.2* 11.1* 9.8*   < > 10.8*   < > 10.3* 9.8* 11.6* 11.3* 9.6* 9.7*   MCV  --   --  90 88  --  88 90 90  --  90   < > 95 94 96 93 94 92   PLT  --   --  405 405  --  466* 422 399  --  468*   < > 291 270 431 386 257 264    < > = values in this interval not displayed.        Metabolic Studies     Recent Labs   Lab Test 08/20/21  0849 08/20/21  0418 08/20/21  0017 08/19/21  1839 08/19/21  1424    133 134 133 128*   POTASSIUM 4.0 4.0 3.7 4.5 3.0*   CHLORIDE 106 104 104 103 98   CO2 18* 14* 17* 17* 17*   BUN 25 24 22 19 18   CR 0.82 0.78 0.70 0.57 0.47*   GFRESTIMATED 80 85 >90 >90 >90       Hepatic Studies    Recent Labs   Lab Test 08/20/21  1143 08/20/21  0849 08/19/21  1424 08/18/21  2132 08/16/21  1008 08/05/21  1611 07/22/21  0804   BILITOTAL 4.9* 5.1* 3.8* 3.6* 3.6* 3.8* 1.9*   ALKPHOS 174* 196* 276* 400* 388*  --  366*   ALBUMIN 4.2 3.9 3.0* 2.0* 2.6* 3.0* 2.4*   AST 49* 59* 51* 66* 73*  --  67*   ALT 26 28 27 36 39  --  39       Microbiology:  Culture   Date Value Ref Range Status   08/19/2021 No growth after 12 hours  Preliminary   08/19/2021 No growth, less than 1 day  Preliminary   08/19/2021 No growth, less than 1 day  Preliminary   08/19/2021 Culture negative, monitoring continues  Preliminary   08/19/2021 No growth after 1 day  Preliminary       Urine Studies    Recent Labs   Lab Test 08/19/21  2135 08/19/21  0513 06/10/21  0632 05/21/21  0730 04/29/21  1106   LEUKEST Negative Negative Negative Negative Negative   WBCU 2 1 1 4 15*       Vancomycin Levels    Recent Labs   Lab Test 06/11/21  0835   VANCOMYCIN 16.8       Hepatitis B Testing   Recent Labs   Lab Test 08/18/21 2132   HBCAB Nonreactive   HEPBANG Nonreactive     Hepatitis C Testing     Hepatitis C Antibody   Date Value Ref Range Status   08/18/2021 Nonreactive Nonreactive Final     Respiratory Virus Testing    No results found for: RS, FLUAG    IMAGING    8/20 CXR   IMPRESSION:   1. Endotracheal tube now projects approximately 5.8 cm cephalad to the  caryn.  2. Decreased perihilar opacities, favoring atelectasis. No new focal  consolidative opacities.    8/19 CT PE  IMPRESSION:   1. No evidence of pulmonary embolism.  Contrast mixing artifact in the  left lower lobe superior segment pulmonary artery. No  evidence of  right heart strain.  2. Endotracheal tube abutting the caryn.  3. Atelectasis/collapse of the posterior aspects of the bilateral  upper and lower lobes.   4. Tree in bud nodularity involving the left upper, left lower, and  the left middle ribs. Possibilities include infectious and  inflammatory etiologies. Recommend follow-up until resolution.  5. 5 mm pulmonary nodule of the right upper lobe, if the patient is  considered high risk for lung cancer consider follow-up low-dose chest  CT in 12 months.

## 2021-08-20 NOTE — CONSULTS
[facility] INFECTIOUS DISEASES CONSULTATION     Patient:  Berta Nava   Date of birth 1965, Medical record number [id number]  Date of Visit:  08/20/2021  Date of Admission: 8/18/2021  Consult Requester:Ernesto Schmitt MD          Assessment and Recommendations:   RECOMMENDATION:  1. Discontinue Micafungin with low suspicion of candidal infection/ does not appear to have had candida in ascites in past.   2. MRSA nares negative, if continues to be improving/stable and BCx without GPCs, would discontinue Vanco tomorrow.  3. Continue meropenem 1g q8hrs at this time while we await results of infectious work up.   4. Please draw blood cultures if patient spikes fevers >100.4.     Infectious diseases will follow peripherally over the weekend. Dr. Anton and DENISSE Ardon will be covering the General ID services over the weekend. Dr. Osborne continue weekday coverage of the [facility] ID service on Monday.      ASSESSMENT:  Berta Nava is a 55 year old female with PMHx significant for PCS/UC (on mesalamine) c/b hepatic cirrhosis, ascites, biliary strictures and choledocholithiasis, issues with recurrent cholangitis, h/o S mitis bacteremia and liver abscess (4/2021) who presented to the hospital in normal state of health for liver transplantation. Unfortunately, developed ventricular tachycardia and hemodynamic instability during procedure requiring defibrillation and admission to ICU. ID consulted to evaluate for septic component of shock.    WBC elevated on admission to 15, note that her WBC count has been largely stable in 11-18 range. UA 8/19 with trace blood but otherwise unremarkable. BCx 8/19 NGTD. CRP was 40. Post procedurally WBC spiked to 40s then trended to mid 30s. Lactate debbie to 4 with improvement to normal. COVID PCR negative. Peritoneal fluid abd Cx 8/19 NGTD. SCx 8/19 ET tube NGTD. MRSA nares sent 8/20. CT PE protocol without e/o PE, tree in bud nodularity of MINI and LLL and L middle ribs,  attn to follow up, 5 mm pulm nodule in RUL.     Patient started on Zosyn+Vanc+fluconazole but switched to meropenem + micafungin+ vanc now.     Improved status of patient over the past few hours. Decreased pressor needs, although still onboard. Vent settings stable. Broad infectious work up sent by SICU. Uncertain if infection lead to patient decompensation during surgical procedure, will await infectious work up and cover on antibiotics at this time.     Thank you for this consult. ID will continue to follow.     Patient was discussed with Dr. Osborne.     Reva Clemente PA-C  Infectious Diseases  Pager #[phone]          History of Present Illness:   Berta Nava is a 55 year old female with PMHx significant for PCS/UC (on mesalamine) c/b hepatic cirrhosis, ascites, biliary strictures and choledocholithiasis, issues with recurrent cholangitis, h/o S mitis bacteremia and liver abscess (4/2021) who presented to the hospital in normal state of health for liver transplantation. Unfortunately, developed ventricular tachycardia and hemodynamic instability during procedure requiring defibrillation and admission to ICU. ID consulted to evaluate for septic component of shock.    Per chart review, patient was in her normal state of health on admission and denied recent illnesses. She was getting weekly paracenteses, last was 8/18. Last ERCP ~ 1 month ago with intrahepatic stents placed. Patient went to the OR 8/29 for liver transplantation however the procedure was aborted when patient developed VT and hemodynamic. Received 4 rounds of defibrillation and placed on 3 pressors. Admitted to ICU for ongoing care.     Lives in [place] with  and dog. Works in IT. Outdoor activities including gardening. Has traveled to [facility] and [place] in the past. No institutionalization or TB exposure. No smoking, ETOH, or illicit drug use.    Past Infx Hx:   -S mitis SBP and BSI 4/2021 s/p prolonged IV abx for possible  liver abscess (-6/11, then PO Augmentin through 6/16).   -5/21 &6/11/21 indeterminate quant gold          Review of Systems:   -Unable to collect as patient is intubated and sedated.          Past Medical History:     Past Medical History:   Diagnosis Date     Ascites      Biliary cirrhosis (H)      Cholangitis, sclerosing      Cirrhosis of liver with ascites (H) 3/3/2021     Hearing loss of left ear     wears a hearing aide     History of low potassium      Hyperlipidemia      Hypertension      SBP (spontaneous bacterial peritonitis) (H) 4/30/2021     Sjogren's syndrome (H)      Ulcerative colitis (H)      Ulcerative pancolitis (H)             Past Surgical History:     Past Surgical History:   Procedure Laterality Date     ENDOSCOPIC RETROGRADE CHOLANGIOPANCREATOGRAM N/A 6/25/2021    Procedure: ENDOSCOPIC RETROGRADE CHOLANGIOPANCREATOGRAPHY with ballon sweep of bile ducts for stones, balloon dilation of bile ducts and bile duct stent placement;  Surgeon: Gregory Gabriel MD;  Location: [facility] OR     ENDOSCOPIC RETROGRADE CHOLANGIOPANCREATOGRAM N/A 7/22/2021    Procedure: ENDOSCOPIC RETROGRADE CHOLANGIOPANCREATOGRAPHY STONE REMOVAL, DILATION AND STENT PLACEMENT;  Surgeon: Gregory Gabriel MD;  Location: [facility] OR     ENDOSCOPIC RETROGRADE CHOLANGIOPANCREATOGRAPHY, EXCHANGE TUBE/STENT N/A 05/05/2021    Procedure: ENDOSCOPIC RETROGRADE CHOLANGIOPANCREATOGRAPHY WITH STENT EXCHANGE, STONE EXTRACTION, AND DILATION;  Surgeon: Gregory Gabriel MD;  Location: [facility] OR     ENDOSCOPIC RETROGRADE CHOLANGIOPANCREATOGRAPHY, EXCHANGE TUBE/STENT N/A 5/10/2021    Procedure: ENDOSCOPIC RETROGRADE CHOLANGIOPANCREATOGRAPHY with biliary dilation, stone removal, stent exchange;  Surgeon: Gregory Gabriel MD;  Location: [facility]     ENDOSCOPIC RETROGRADE CHOLANGIOPANCREATOGRAPHY, EXCHANGE TUBE/STENT N/A 6/10/2021    Procedure: ENDOSCOPIC RETROGRADE CHOLANGIOPANCREATOGRAPHY, WITH biliary stent exchange, stone removal;  Surgeon: Woodrow Lott MD;   Location: [facility] OR     GYN SURGERY      bilat fallopian tubes and ovaries removed     PICC DOUBLE LUMEN PLACEMENT Right 2021    42cm (2cm external), Lateral brachial vein     TRANSPLANT LIVER RECIPIENT  DONOR N/A 2021    Procedure: Opening of abdomen, Abdominal Exploration and aborted liver transplant.;  Surgeon: Ernesto Schmitt MD;  Location: [facility] OR            Family History:     Family History   Problem Relation Age of Onset     Cancer Mother         angiosarcoma     Coronary Artery Disease Early Onset Father         MI age 46     Heart Transplant Father      Liver Disease No family hx of      Ulcerative Colitis No family hx of      Crohn's Disease No family hx of             Social History:     Social History     Tobacco Use     Smoking status: Never Smoker     Smokeless tobacco: Never Used   Substance Use Topics     Alcohol use: No     Comment: Last drink was      History   Sexual Activity     Sexual activity: Not on file            Current Medications:       hydrocortisone sodium succinate PF  100 mg Intravenous Q8H     insulin aspart  1-6 Units Subcutaneous Q4H     lactated ringers  250 mL Intravenous Once     meropenem  1 g Intravenous Q8H     micafungin  100 mg Intravenous Q24H     pantoprazole (PROTONIX) IV  40 mg Intravenous Daily with breakfast     vancomycin  1,250 mg Intravenous Q18H            Allergies:     Allergies   Allergen Reactions     Diagnostic X-Ray Materials Hives     PATIENT HAD HIVE REACTION AFTER ADMINISTERING CT CONTRAST DYE.       Contrast Dye             Physical Exam:   Vitals were reviewed  Patient Vitals for the past 24 hrs:   BP Temp Temp src Pulse Resp SpO2 Height Weight   21 1300 -- 98.4  F (36.9  C) -- 59 -- 98 % -- --   21 1215 -- 98.4  F (36.9  C) -- 62 -- 98 % -- --   21 1200 -- 98.4  F (36.9  C) Bladder 60 16 98 % -- --   21 1145 -- 98.2  F (36.8  C) -- 60 -- 97 % -- --   21 1130 -- 98.1  F (36.7  C) -- 62 -- 98 % -- --    08/20/21 1115 -- 98.1  F (36.7  C) -- 62 -- 97 % -- --   08/20/21 1100 -- 98.1  F (36.7  C) -- 61 16 97 % -- --   08/20/21 1015 -- -- -- 63 -- 97 % -- --   08/20/21 1000 -- 98.1  F (36.7  C) -- 63 16 97 % -- --   08/20/21 0945 -- -- -- 62 -- 97 % -- --   08/20/21 0930 -- -- -- 64 -- 97 % -- --   08/20/21 0915 -- 98.4  F (36.9  C) -- 63 -- 97 % -- --   08/20/21 0900 -- 98.4  F (36.9  C) -- 65 16 97 % -- --   08/20/21 0845 -- 98.6  F (37  C) -- 66 -- 97 % -- --   08/20/21 0830 -- 98.6  F (37  C) -- 67 -- 98 % -- --   08/20/21 0815 -- 98.6  F (37  C) -- 67 -- 97 % -- --   08/20/21 0800 -- 98.6  F (37  C) Bladder 67 14 98 % -- --   08/20/21 0730 -- 98.8  F (37.1  C) -- 68 -- 97 % -- --   08/20/21 0715 -- 99  F (37.2  C) -- 69 -- 97 % -- --   08/20/21 0700 -- 99  F (37.2  C) -- 68 -- 97 % -- --   08/20/21 0645 -- 99  F (37.2  C) -- 71 -- 98 % -- --   08/20/21 0630 -- 99  F (37.2  C) -- 72 -- 98 % -- --   08/20/21 0615 -- 99  F (37.2  C) -- 73 -- 98 % -- --   08/20/21 0600 -- 99  F (37.2  C) -- 72 -- 97 % -- --   08/20/21 0545 -- 99  F (37.2  C) -- 74 -- 97 % -- --   08/20/21 0530 -- 99  F (37.2  C) -- 73 -- 98 % -- --   08/20/21 0515 -- 99.1  F (37.3  C) -- 76 -- 97 % -- --   08/20/21 0500 -- 99.1  F (37.3  C) -- 77 15 97 % -- --   08/20/21 0445 -- 99.3  F (37.4  C) -- 77 -- 97 % -- --   08/20/21 0430 -- 99.3  F (37.4  C) -- 75 -- 100 % -- --   08/20/21 0415 -- 99.3  F (37.4  C) -- 78 -- 98 % -- --   08/20/21 0400 119/66 99.3  F (37.4  C) Bladder 80 14 97 % -- --   08/20/21 0345 -- 99.3  F (37.4  C) -- 77 -- 97 % -- 63.1 kg (139 lb 1.8 oz)   08/20/21 0330 -- 99.3  F (37.4  C) -- 78 -- 97 % -- --   08/20/21 0315 -- 99.5  F (37.5  C) -- 78 -- 96 % -- --   08/20/21 0300 122/65 99.7  F (37.6  C) -- 78 16 96 % -- --   08/20/21 0245 -- 99.7  F (37.6  C) -- 77 -- 96 % -- --   08/20/21 0232 -- -- -- -- -- 98 % -- --   08/20/21 0230 -- 99.9  F (37.7  C) -- 79 -- 100 % -- --   08/20/21 0225 -- 99.9  F (37.7  C) -- 80 -- 97 % --  --   08/20/21 0220 126/76 99.9  F (37.7  C) -- 80 -- 97 % -- --   08/20/21 0215 126/76 99.9  F (37.7  C) -- 79 -- 97 % -- --   08/20/21 0210 121/59 99.9  F (37.7  C) -- 77 -- 97 % -- --   08/20/21 0205 -- 99.9  F (37.7  C) -- 80 -- 97 % -- --   08/20/21 0200 125/65 99.9  F (37.7  C) -- 80 15 97 % -- --   08/20/21 0155 -- 99.9  F (37.7  C) -- 80 -- 97 % -- --   08/20/21 0150 134/65 99.9  F (37.7  C) -- 81 -- 97 % -- --   08/20/21 0145 131/64 99.9  F (37.7  C) -- 82 -- 97 % -- --   08/20/21 0140 133/68 99.9  F (37.7  C) -- 84 -- 97 % -- --   08/20/21 0135 133/66 99.9  F (37.7  C) -- 83 -- 97 % -- --   08/20/21 0130 135/65 99.9  F (37.7  C) -- 83 -- 97 % -- --   08/20/21 0125 129/69 99.9  F (37.7  C) -- 82 -- 97 % -- --   08/20/21 0120 136/68 99.9  F (37.7  C) -- 82 -- 98 % -- --   08/20/21 0115 133/65 99.9  F (37.7  C) -- 81 -- 97 % -- --   08/20/21 0110 114/61 99.9  F (37.7  C) -- 80 -- 97 % -- --   08/20/21 0105 119/62 99.9  F (37.7  C) -- 79 -- 97 % -- --   08/20/21 0100 116/64 99.9  F (37.7  C) -- 79 15 97 % -- --   08/20/21 0055 118/65 99.7  F (37.6  C) -- 79 -- 97 % -- --   08/20/21 0050 118/65 99.7  F (37.6  C) -- 79 -- 96 % -- --   08/20/21 0045 114/62 99.7  F (37.6  C) -- 80 -- 97 % -- --   08/20/21 0040 -- 99.7  F (37.6  C) -- 79 -- 97 % -- --   08/20/21 0035 -- 99.7  F (37.6  C) -- 79 -- 97 % -- --   08/20/21 0030 108/54 99.5  F (37.5  C) -- 80 -- 97 % -- --   08/20/21 0025 -- 99.5  F (37.5  C) -- 79 -- 96 % -- --   08/20/21 0020 -- 99.5  F (37.5  C) -- 80 -- 97 % -- --   08/20/21 0015 104/59 99.5  F (37.5  C) -- 80 -- 97 % -- --   08/20/21 0010 -- 99.5  F (37.5  C) -- 80 -- 97 % -- --   08/20/21 0005 -- 99.3  F (37.4  C) -- 80 -- 97 % -- --   08/20/21 0000 109/58 99.3  F (37.4  C) Bladder 79 15 97 % -- --   08/19/21 2345 105/59 99.1  F (37.3  C) -- 79 -- 98 % -- --   08/19/21 2330 103/54 99.1  F (37.3  C) -- 80 -- 97 % -- --   08/19/21 2315 94/52 99.1  F (37.3  C) -- 80 -- 96 % -- --   08/19/21 2300 95/52  "99.1  F (37.3  C) -- 80 -- 97 % -- --   08/19/21 2245 97/53 99.3  F (37.4  C) -- 81 -- 97 % -- --   08/19/21 2230 92/50 99.3  F (37.4  C) -- 81 -- 97 % -- --   08/19/21 2215 (!) 82/48 99.3  F (37.4  C) -- 81 -- 97 % -- --   08/19/21 2200 90/51 99.3  F (37.4  C) -- 83 -- 98 % -- --   08/19/21 2151 -- -- -- -- -- 96 % -- --   08/19/21 2145 95/53 99.3  F (37.4  C) -- 83 -- 96 % -- --   08/19/21 2130 97/54 99.3  F (37.4  C) -- 85 -- 96 % -- --   08/19/21 2115 97/53 99.3  F (37.4  C) -- 83 -- 96 % -- --   08/19/21 2100 97/56 99.3  F (37.4  C) -- 85 -- 96 % -- --   08/19/21 2045 92/54 99.3  F (37.4  C) -- 85 -- 96 % -- --   08/19/21 2030 111/58 99.3  F (37.4  C) -- 96 -- 97 % -- --   08/19/21 2015 98/55 99.5  F (37.5  C) -- 89 -- 96 % -- --   08/19/21 2000 99/59 99.7  F (37.6  C) Bladder 96 22 98 % -- --   08/19/21 1945 97/54 99.9  F (37.7  C) -- 91 -- 97 % -- --   08/19/21 1930 98/52 99.9  F (37.7  C) -- 92 -- 97 % -- --   08/19/21 1915 99/54 100  F (37.8  C) -- 94 -- 97 % -- --   08/19/21 1900 99/56 100.2  F (37.9  C) -- 93 -- 98 % -- --   08/19/21 1830 115/60 99.9  F (37.7  C) -- 98 23 -- -- --   08/19/21 1815 110/54 99.7  F (37.6  C) -- 96 25 97 % -- --   08/19/21 1800 111/55 99.3  F (37.4  C) -- 97 -- 93 % -- --   08/19/21 1745 111/59 99  F (37.2  C) -- 98 -- 96 % -- --   08/19/21 1730 -- 98.4  F (36.9  C) -- 93 -- 97 % -- --   08/19/21 1726 -- -- -- 96 -- 97 % -- --   08/19/21 1715 -- 98.1  F (36.7  C) -- 96 -- 97 % -- --   08/19/21 1700 -- 97.5  F (36.4  C) -- 96 -- 97 % -- --   08/19/21 1657 -- -- -- 93 -- 97 % -- --   08/19/21 1645 -- 97  F (36.1  C) -- 92 -- 97 % -- --   08/19/21 1642 -- -- -- 92 -- 97 % -- --   08/19/21 1630 -- (!) 96.6  F (35.9  C) -- 90 -- 97 % -- --   08/19/21 1615 -- (!) 96.1  F (35.6  C) -- 90 12 97 % 1.651 m (5' 5\") --   08/19/21 1600 -- (!) 95.5  F (35.3  C) -- 90 12 98 % -- --   08/19/21 1545 -- (!) 95.4  F (35.2  C) -- 89 12 98 % -- --   08/19/21 1530 -- (!) 95  F (35  C) -- 87 12 97 % " -- --   08/19/21 1515 -- (!) 95  F (35  C) -- 88 -- 97 % -- --   08/19/21 1500 131/75 (!) 95  F (35  C) Bladder 90 -- 98 % -- --   08/19/21 1445 -- -- -- 90 -- 97 % -- --   08/19/21 1430 -- -- -- 84 12 97 % -- --   08/19/21 1415 -- -- -- 89 -- 96 % -- --   08/19/21 1400 120/69 -- -- 109 -- 99 % -- --       Physical Examination:  Constitutional: Female patient seen lying in bed, intubated and sedated. In NAD.  HEENT: NCAT, MMM.   Respiratory: Non-labored breathing on vent. Lungs are clear to auscultation bilaterally, without wheezing or crackles.  Cardiovascular: RRR.  GI: Normoactive BS. Abdomen is soft, mildly distended.   Skin: Warm and dry. No rashes or lesions on exposed surfaces. Somewhat jaundiced.  Musculoskeletal: Extremities grossly normal. No edema.  Neurologic: Intubated and sedated.   VAD: CVCs c/d/i.          Laboratory Data:     Inflammatory Markers    Recent Labs   Lab Test 08/19/21  0018 06/08/21  1602 06/01/21  0915 05/25/21  1455 05/21/21  1500 05/18/21  1010   CRP 40.0* 23.1* 16.1* 14.3* 16.1* 21.4*       Hematology Studies    Recent Labs   Lab Test 08/20/21  1143 08/20/21  0849 08/20/21  0419 08/19/21  2347 08/19/21  2056 08/19/21  1839 08/19/21  1424 08/18/21  2132 08/16/21  1008 08/16/21  1008 06/21/21  0938 06/12/21  1259 06/10/21  0702 06/09/21  1527 06/01/21  0915 05/12/21  0542 05/11/21  0724   WBC  --   --  31.3* 34.2*  --  42.3* 29.2* 15.1*  --  14.3*   < > 21.3* 22.8* 27.5* 11.5* 14.5* 18.3*   ANEU  --   --   --   --   --   --   --   --   --   --   --  18.5* 22.0* 25.9* 9.3* 11.3* 16.3*   AEOS  --   --   --   --   --   --   --   --   --   --   --  0.0 0.0 0.0 0.2 0.2 0.0   HGB 8.9* 9.4* 10.3*  10.3* 10.2*  10.2* 10.7* 11.2* 11.1* 9.8*   < > 10.8*   < > 10.3* 9.8* 11.6* 11.3* 9.6* 9.7*   MCV  --   --  90 88  --  88 90 90  --  90   < > 95 94 96 93 94 92   PLT  --   --  405 405  --  466* 422 399  --  468*   < > 291 270 431 386 257 264    < > = values in this interval not displayed.        Metabolic Studies     Recent Labs   Lab Test 08/20/21  0849 08/20/21  0418 08/20/21  0017 08/19/21  1839 08/19/21  1424    133 134 133 128*   POTASSIUM 4.0 4.0 3.7 4.5 3.0*   CHLORIDE 106 104 104 103 98   CO2 18* 14* 17* 17* 17*   BUN 25 24 22 19 18   CR 0.82 0.78 0.70 0.57 0.47*   GFRESTIMATED 80 85 >90 >90 >90       Hepatic Studies    Recent Labs   Lab Test 08/20/21  1143 08/20/21  0849 08/19/21  1424 08/18/21  2132 08/16/21  1008 08/05/21  1611 07/22/21  0804   BILITOTAL 4.9* 5.1* 3.8* 3.6* 3.6* 3.8* 1.9*   ALKPHOS 174* 196* 276* 400* 388*  --  366*   ALBUMIN 4.2 3.9 3.0* 2.0* 2.6* 3.0* 2.4*   AST 49* 59* 51* 66* 73*  --  67*   ALT 26 28 27 36 39  --  39       Microbiology:  Culture   Date Value Ref Range Status   08/19/2021 No growth after 12 hours  Preliminary   08/19/2021 No growth, less than 1 day  Preliminary   08/19/2021 No growth, less than 1 day  Preliminary   08/19/2021 Culture negative, monitoring continues  Preliminary   08/19/2021 No growth after 1 day  Preliminary       Urine Studies    Recent Labs   Lab Test 08/19/21  2135 08/19/21  0513 06/10/21  0632 05/21/21  0730 04/29/21  1106   LEUKEST Negative Negative Negative Negative Negative   WBCU 2 1 1 4 15*       Vancomycin Levels    Recent Labs   Lab Test 06/11/21  0835   VANCOMYCIN 16.8       Hepatitis B Testing   Recent Labs   Lab Test 08/18/21 2132   HBCAB Nonreactive   HEPBANG Nonreactive     Hepatitis C Testing     Hepatitis C Antibody   Date Value Ref Range Status   08/18/2021 Nonreactive Nonreactive Final     Respiratory Virus Testing    No results found for: RS, FLUAG    IMAGING    8/20 CXR   IMPRESSION:   1. Endotracheal tube now projects approximately 5.8 cm cephalad to the  caryn.  2. Decreased perihilar opacities, favoring atelectasis. No new focal  consolidative opacities.    8/19 CT PE  IMPRESSION:   1. No evidence of pulmonary embolism.  Contrast mixing artifact in the  left lower lobe superior segment pulmonary artery. No  evidence of  right heart strain.  2. Endotracheal tube abutting the caryn.  3. Atelectasis/collapse of the posterior aspects of the bilateral  upper and lower lobes.   4. Tree in bud nodularity involving the left upper, left lower, and  the left middle ribs. Possibilities include infectious and  inflammatory etiologies. Recommend follow-up until resolution.  5. 5 mm pulmonary nodule of the right upper lobe, if the patient is  considered high risk for lung cancer consider follow-up low-dose chest  CT in 12 months.

## 2021-08-20 NOTE — PHARMACY-VANCOMYCIN DOSING SERVICE
Pharmacy Empiric Dose Change Per Policy  Original Dose Ordered: Vancomycin 1250 mg q18h  Dose Changed To: Vancomycin 1250 mg q24h    This dose change was based on the pharmacist's assessment of this patient's age, weight, concurrent drug therapy, treatment goals, whether patient's creatinine clearance adequately indicates renal function (factoring in age, muscle mass, fluid and clinical status), and, if applicable, prior pharmacokinetic data.    Creatinine Clearance=Estimated Creatinine Clearance: 75.4 mL/min (based on SCr of 0.82 mg/dL).     Will continue to follow and modify dosage according to levels, organ function and clinical condition    Ronaldo Watts, PharmD, MS

## 2021-08-20 NOTE — PHARMACY-VANCOMYCIN DOSING SERVICE
"Pharmacy Vancomycin Initial Note  Date of Service 2021  Patient's  1965  55 year old, female    Indication: Sepsis    Current estimated CrCl = Estimated Creatinine Clearance: 102.3 mL/min (based on SCr of 0.57 mg/dL).    Creatinine for last 3 days  2021:  9:32 PM Creatinine 0.65 mg/dL  2021:  2:24 PM Creatinine 0.47 mg/dL;  6:39 PM Creatinine 0.57 mg/dL    Recent Vancomycin Level(s) for last 3 days  No results found for requested labs within last 72 hours.      Vancomycin IV Administrations (past 72 hours)                   vancomycin 1250 mg in 0.9% NaCl 250 mL intermittent infusion 1,250 mg (mg) 1,250 mg New Bag 21 0601                Nephrotoxins and other renal medications (From now, onward)    Start     Dose/Rate Route Frequency Ordered Stop    21 1600  vancomycin 1250 mg in 0.9% NaCl 250 mL intermittent infusion 1,250 mg      1,250 mg  over 90 Minutes Intravenous EVERY 18 HOURS 21 220  vancomycin 1500 mg in 0.9% NaCl 250 ml intermittent infusion 1,500 mg      1,500 mg  over 90 Minutes Intravenous ONCE 21 2130  piperacillin-tazobactam (ZOSYN) 4.5 g vial to attach to  mL bag     Note to Pharmacy: For [facility], [facility] and [facility]: For Zosyn-naive patients, use the \"Zosyn initial dose + extended infusion\" order panel.    4.5 g  over 30 Minutes Intravenous EVERY 6 HOURS 21 1430  vasopressin 0.2 units/mL in NS (PITRESSIN) standard conc infusion      2.4 Units/hr  12 mL/hr  Intravenous CONTINUOUS 21 1420      21 0900  norepinephrine (LEVOPHED) 16 mg in  mL infusion MAX CONC CENTRAL LINE      0.01-0.6 mcg/kg/min × 59.5 kg  0.6-33.5 mL/hr  Intravenous CONTINUOUS 21 0839            Contrast Orders - past 72 hours (72h ago, onward)    Start     Dose/Rate Route Frequency Ordered Stop    21 1400  iopamidol (ISOVUE-370) solution 52 mL      52 mL Intravenous ONCE 21 1342 21 " 1355          Loading dose: 1500 mg at 22:00 08/19/2021.  Regimen: 1250 mg IV every 18 hours.  Start time: 21:13 on 08/19/2021  Exposure target: AUC24 (range)400-600 mg/L.hr   AUC24,ss: 531 mg/L.hr  Probability of AUC24 > 400: 79 %  Ctrough,ss: 14.8 mg/L  Probability of Ctrough,ss > 20: 27 %  Probability of nephrotoxicity (Lodise DENA 2009): 10 %        Plan:  1. Start vancomycin  1500 mg (25 mg/kg) IV once then 1250 mg Q18H.   2. Vancomycin monitoring method: AUC  3. Vancomycin therapeutic monitoring goal: 400-600 mg*h/L  4. Pharmacy will check vancomycin levels as appropriate in 1-3 Days.    5. Serum creatinine levels will be ordered daily for the first week of therapy and at least twice weekly for subsequent weeks.      Vick Ackerman RPH

## 2021-08-20 NOTE — PROGRESS NOTES
Major Shift Events: Main goal throughout shift to wean pressors, wean sedation, and replace output from ROMA drain. Epinepherine turned off, angiotensin II turned off, levophed running at 0.1, and vasopressin running at 2.4. Sedation weaned to fentanyl at 50 and propofol at 25. Pt now responds to gentle shaking. Pupils remain equal and reactive, but sluggish. Heart rate in high 50's to 60's with frequent PAC's. Amiodarone running at 0.5. Ventilator settings altered slightly to RR 16, PEEP 5, , and 45% FiO2. Decreased urine output despite albumin replacements. MD notified. NG to LIS with minimal output. Clamshell incision remains CDI.   Plan: Continue to wean pressors and move towards extubation.   For vital signs and complete assessments, please see documentation flowsheets.

## 2021-08-21 ENCOUNTER — APPOINTMENT (OUTPATIENT)
Dept: GENERAL RADIOLOGY | Facility: CLINIC | Age: 56
DRG: 420 | End: 2021-08-21
Attending: TRANSPLANT SURGERY
Payer: COMMERCIAL

## 2021-08-21 ENCOUNTER — ANESTHESIA (OUTPATIENT)
Dept: INTENSIVE CARE | Facility: CLINIC | Age: 56
DRG: 420 | End: 2021-08-21
Payer: COMMERCIAL

## 2021-08-21 LAB
ALBUMIN SERPL-MCNC: 4.1 G/DL (ref 3.4–5)
ALBUMIN SERPL-MCNC: 4.3 G/DL (ref 3.4–5)
ALBUMIN SERPL-MCNC: 4.5 G/DL (ref 3.4–5)
ALP SERPL-CCNC: 150 U/L (ref 40–150)
ALP SERPL-CCNC: 151 U/L (ref 40–150)
ALP SERPL-CCNC: 186 U/L (ref 40–150)
ALT SERPL W P-5'-P-CCNC: 25 U/L (ref 0–50)
ALT SERPL W P-5'-P-CCNC: 26 U/L (ref 0–50)
ALT SERPL W P-5'-P-CCNC: 26 U/L (ref 0–50)
ANION GAP SERPL CALCULATED.3IONS-SCNC: 9 MMOL/L (ref 3–14)
AST SERPL W P-5'-P-CCNC: 43 U/L (ref 0–45)
AST SERPL W P-5'-P-CCNC: 44 U/L (ref 0–45)
AST SERPL W P-5'-P-CCNC: 49 U/L (ref 0–45)
BACTERIA SPEC CULT: NORMAL
BACTERIA SPT CULT: NORMAL
BILIRUB DIRECT SERPL-MCNC: 2.2 MG/DL (ref 0–0.2)
BILIRUB DIRECT SERPL-MCNC: 2.4 MG/DL (ref 0–0.2)
BILIRUB DIRECT SERPL-MCNC: 2.7 MG/DL (ref 0–0.2)
BILIRUB SERPL-MCNC: 3.1 MG/DL (ref 0.2–1.3)
BILIRUB SERPL-MCNC: 3.5 MG/DL (ref 0.2–1.3)
BILIRUB SERPL-MCNC: 4 MG/DL (ref 0.2–1.3)
BUN SERPL-MCNC: 39 MG/DL (ref 7–30)
CALCIUM SERPL-MCNC: 9 MG/DL (ref 8.5–10.1)
CHLORIDE BLD-SCNC: 107 MMOL/L (ref 94–109)
CO2 SERPL-SCNC: 19 MMOL/L (ref 20–32)
CREAT SERPL-MCNC: 1.14 MG/DL (ref 0.52–1.04)
ERYTHROCYTE [DISTWIDTH] IN BLOOD BY AUTOMATED COUNT: 16.6 % (ref 10–15)
GFR SERPL CREATININE-BSD FRML MDRD: 54 ML/MIN/1.73M2
GLUCOSE BLD-MCNC: 150 MG/DL (ref 70–99)
GLUCOSE BLDC GLUCOMTR-MCNC: 118 MG/DL (ref 70–99)
GLUCOSE BLDC GLUCOMTR-MCNC: 127 MG/DL (ref 70–99)
GLUCOSE BLDC GLUCOMTR-MCNC: 140 MG/DL (ref 70–99)
GLUCOSE BLDC GLUCOMTR-MCNC: 142 MG/DL (ref 70–99)
GLUCOSE BLDC GLUCOMTR-MCNC: 147 MG/DL (ref 70–99)
GLUCOSE BLDC GLUCOMTR-MCNC: 147 MG/DL (ref 70–99)
GRAM STAIN RESULT: NORMAL
GRAM STAIN RESULT: NORMAL
HCT VFR BLD AUTO: 25.1 % (ref 35–47)
HGB BLD-MCNC: 8.4 G/DL (ref 11.7–15.7)
HGB BLD-MCNC: 8.4 G/DL (ref 11.7–15.7)
HGB BLD-MCNC: 8.5 G/DL (ref 11.7–15.7)
LACTATE SERPL-SCNC: 1.1 MMOL/L (ref 0.7–2)
LACTATE SERPL-SCNC: 1.3 MMOL/L (ref 0.7–2)
MAGNESIUM SERPL-MCNC: 2.4 MG/DL (ref 1.6–2.3)
MCH RBC QN AUTO: 30.2 PG (ref 26.5–33)
MCHC RBC AUTO-ENTMCNC: 32.4 G/DL (ref 31.5–36.5)
MCV RBC AUTO: 91 FL (ref 78–100)
PHOSPHATE SERPL-MCNC: 4.6 MG/DL (ref 2.5–4.5)
PLATELET # BLD AUTO: 269 10E3/UL (ref 150–450)
POTASSIUM BLD-SCNC: 4 MMOL/L (ref 3.4–5.3)
POTASSIUM BLD-SCNC: 4 MMOL/L (ref 3.4–5.3)
PROT SERPL-MCNC: 6.4 G/DL (ref 6.8–8.8)
PROT SERPL-MCNC: 6.6 G/DL (ref 6.8–8.8)
PROT SERPL-MCNC: 6.6 G/DL (ref 6.8–8.8)
RBC # BLD AUTO: 2.75 10E6/UL (ref 3.8–5.2)
SODIUM SERPL-SCNC: 135 MMOL/L (ref 133–144)
WBC # BLD AUTO: 20 10E3/UL (ref 4–11)

## 2021-08-21 PROCEDURE — 94003 VENT MGMT INPAT SUBQ DAY: CPT

## 2021-08-21 PROCEDURE — 999N000157 HC STATISTIC RCP TIME EA 10 MIN

## 2021-08-21 PROCEDURE — 99024 POSTOP FOLLOW-UP VISIT: CPT | Performed by: TRANSPLANT SURGERY

## 2021-08-21 PROCEDURE — 83605 ASSAY OF LACTIC ACID: CPT

## 2021-08-21 PROCEDURE — 250N000011 HC RX IP 250 OP 636

## 2021-08-21 PROCEDURE — 250N000013 HC RX MED GY IP 250 OP 250 PS 637: Performed by: STUDENT IN AN ORGANIZED HEALTH CARE EDUCATION/TRAINING PROGRAM

## 2021-08-21 PROCEDURE — 36592 COLLECT BLOOD FROM PICC: CPT

## 2021-08-21 PROCEDURE — 250N000011 HC RX IP 250 OP 636: Performed by: STUDENT IN AN ORGANIZED HEALTH CARE EDUCATION/TRAINING PROGRAM

## 2021-08-21 PROCEDURE — 85018 HEMOGLOBIN: CPT

## 2021-08-21 PROCEDURE — 84132 ASSAY OF SERUM POTASSIUM: CPT | Performed by: NURSE PRACTITIONER

## 2021-08-21 PROCEDURE — 83735 ASSAY OF MAGNESIUM: CPT | Performed by: TRANSPLANT SURGERY

## 2021-08-21 PROCEDURE — 250N000013 HC RX MED GY IP 250 OP 250 PS 637: Performed by: NURSE PRACTITIONER

## 2021-08-21 PROCEDURE — 82248 BILIRUBIN DIRECT: CPT | Performed by: TRANSPLANT SURGERY

## 2021-08-21 PROCEDURE — 82248 BILIRUBIN DIRECT: CPT | Performed by: STUDENT IN AN ORGANIZED HEALTH CARE EDUCATION/TRAINING PROGRAM

## 2021-08-21 PROCEDURE — 258N000003 HC RX IP 258 OP 636: Performed by: NURSE PRACTITIONER

## 2021-08-21 PROCEDURE — C9113 INJ PANTOPRAZOLE SODIUM, VIA: HCPCS | Performed by: NURSE PRACTITIONER

## 2021-08-21 PROCEDURE — P9041 ALBUMIN (HUMAN),5%, 50ML: HCPCS

## 2021-08-21 PROCEDURE — 200N000002 HC R&B ICU UMMC

## 2021-08-21 PROCEDURE — 999N000065 XR CHEST PORT 1 VIEW

## 2021-08-21 PROCEDURE — 82248 BILIRUBIN DIRECT: CPT

## 2021-08-21 PROCEDURE — 71045 X-RAY EXAM CHEST 1 VIEW: CPT | Mod: 26 | Performed by: RADIOLOGY

## 2021-08-21 PROCEDURE — 999N000015 HC STATISTIC ARTERIAL MONITORING DAILY

## 2021-08-21 PROCEDURE — 99291 CRITICAL CARE FIRST HOUR: CPT | Performed by: NURSE PRACTITIONER

## 2021-08-21 PROCEDURE — 84100 ASSAY OF PHOSPHORUS: CPT | Performed by: TRANSPLANT SURGERY

## 2021-08-21 PROCEDURE — 250N000011 HC RX IP 250 OP 636: Performed by: NURSE PRACTITIONER

## 2021-08-21 PROCEDURE — 85027 COMPLETE CBC AUTOMATED: CPT | Performed by: NURSE PRACTITIONER

## 2021-08-21 RX ORDER — AMIODARONE HYDROCHLORIDE 200 MG/1
400 TABLET ORAL 2 TIMES DAILY
Status: COMPLETED | OUTPATIENT
Start: 2021-08-21 | End: 2021-08-30

## 2021-08-21 RX ORDER — HEPARIN SODIUM 5000 [USP'U]/.5ML
5000 INJECTION, SOLUTION INTRAVENOUS; SUBCUTANEOUS EVERY 8 HOURS
Status: DISCONTINUED | OUTPATIENT
Start: 2021-08-21 | End: 2021-08-21

## 2021-08-21 RX ORDER — FUROSEMIDE 10 MG/ML
20 INJECTION INTRAMUSCULAR; INTRAVENOUS ONCE
Status: COMPLETED | OUTPATIENT
Start: 2021-08-21 | End: 2021-08-21

## 2021-08-21 RX ORDER — AMIODARONE HYDROCHLORIDE 200 MG/1
200 TABLET ORAL DAILY
Status: DISCONTINUED | OUTPATIENT
Start: 2021-08-31 | End: 2021-09-06

## 2021-08-21 RX ORDER — FLUCONAZOLE 2 MG/ML
400 INJECTION, SOLUTION INTRAVENOUS EVERY 24 HOURS
Status: DISCONTINUED | OUTPATIENT
Start: 2021-08-21 | End: 2021-08-21

## 2021-08-21 RX ORDER — PIPERACILLIN SODIUM, TAZOBACTAM SODIUM 3; .375 G/15ML; G/15ML
3.38 INJECTION, POWDER, LYOPHILIZED, FOR SOLUTION INTRAVENOUS EVERY 6 HOURS
Status: DISCONTINUED | OUTPATIENT
Start: 2021-08-21 | End: 2021-08-21

## 2021-08-21 RX ORDER — POLYETHYLENE GLYCOL 3350 17 G/17G
17 POWDER, FOR SOLUTION ORAL DAILY
Status: DISCONTINUED | OUTPATIENT
Start: 2021-08-21 | End: 2021-08-23

## 2021-08-21 RX ORDER — SENNOSIDES 8.6 MG
8.6 TABLET ORAL 2 TIMES DAILY
Status: DISCONTINUED | OUTPATIENT
Start: 2021-08-21 | End: 2021-08-23

## 2021-08-21 RX ORDER — PANTOPRAZOLE SODIUM 40 MG/1
40 TABLET, DELAYED RELEASE ORAL
Status: DISCONTINUED | OUTPATIENT
Start: 2021-08-22 | End: 2021-08-21

## 2021-08-21 RX ADMIN — ALBUMIN HUMAN 12.5 G: 0.05 INJECTION, SOLUTION INTRAVENOUS at 08:06

## 2021-08-21 RX ADMIN — PROPOFOL 40 MCG/KG/MIN: 10 INJECTION, EMULSION INTRAVENOUS at 17:32

## 2021-08-21 RX ADMIN — ALBUMIN HUMAN 12.5 G: 0.05 INJECTION, SOLUTION INTRAVENOUS at 00:35

## 2021-08-21 RX ADMIN — POLYETHYLENE GLYCOL 3350 17 G: 17 POWDER, FOR SOLUTION ORAL at 12:18

## 2021-08-21 RX ADMIN — MEROPENEM 1 G: 1 INJECTION, POWDER, FOR SOLUTION INTRAVENOUS at 09:48

## 2021-08-21 RX ADMIN — INSULIN ASPART 1 UNITS: 100 INJECTION, SOLUTION INTRAVENOUS; SUBCUTANEOUS at 00:05

## 2021-08-21 RX ADMIN — ALBUMIN HUMAN 12.5 G: 0.05 INJECTION, SOLUTION INTRAVENOUS at 04:28

## 2021-08-21 RX ADMIN — HYDROCORTISONE SODIUM SUCCINATE 100 MG: 100 INJECTION, POWDER, FOR SOLUTION INTRAMUSCULAR; INTRAVENOUS at 15:08

## 2021-08-21 RX ADMIN — MEROPENEM 1 G: 1 INJECTION, POWDER, FOR SOLUTION INTRAVENOUS at 00:38

## 2021-08-21 RX ADMIN — MEROPENEM 1 G: 1 INJECTION, POWDER, FOR SOLUTION INTRAVENOUS at 17:32

## 2021-08-21 RX ADMIN — ALBUMIN HUMAN 12.5 G: 0.05 INJECTION, SOLUTION INTRAVENOUS at 05:57

## 2021-08-21 RX ADMIN — PROPOFOL 25 MCG/KG/MIN: 10 INJECTION, EMULSION INTRAVENOUS at 10:46

## 2021-08-21 RX ADMIN — FUROSEMIDE 20 MG: 10 INJECTION, SOLUTION INTRAVENOUS at 13:49

## 2021-08-21 RX ADMIN — ENOXAPARIN SODIUM 40 MG: 40 INJECTION SUBCUTANEOUS at 13:49

## 2021-08-21 RX ADMIN — INSULIN ASPART 1 UNITS: 100 INJECTION, SOLUTION INTRAVENOUS; SUBCUTANEOUS at 04:29

## 2021-08-21 RX ADMIN — SENNOSIDES 8.6 MG: 8.6 TABLET ORAL at 20:47

## 2021-08-21 RX ADMIN — PROPOFOL 30 MCG/KG/MIN: 10 INJECTION, EMULSION INTRAVENOUS at 04:39

## 2021-08-21 RX ADMIN — VASOPRESSIN 2.4 UNITS/HR: 20 INJECTION INTRAVENOUS at 05:48

## 2021-08-21 RX ADMIN — AMIODARONE HYDROCHLORIDE 400 MG: 200 TABLET ORAL at 20:47

## 2021-08-21 RX ADMIN — INSULIN ASPART 1 UNITS: 100 INJECTION, SOLUTION INTRAVENOUS; SUBCUTANEOUS at 08:53

## 2021-08-21 RX ADMIN — HYDROCORTISONE SODIUM SUCCINATE 100 MG: 100 INJECTION, POWDER, FOR SOLUTION INTRAMUSCULAR; INTRAVENOUS at 08:06

## 2021-08-21 RX ADMIN — Medication 50 MCG/HR: at 07:30

## 2021-08-21 RX ADMIN — AMIODARONE HYDROCHLORIDE 400 MG: 200 TABLET ORAL at 12:18

## 2021-08-21 RX ADMIN — PANTOPRAZOLE SODIUM 40 MG: 40 INJECTION, POWDER, FOR SOLUTION INTRAVENOUS at 08:06

## 2021-08-21 ASSESSMENT — ACTIVITIES OF DAILY LIVING (ADL)
ADLS_ACUITY_SCORE: 17

## 2021-08-21 ASSESSMENT — MIFFLIN-ST. JEOR: SCORE: 1247.88

## 2021-08-21 NOTE — PROGRESS NOTES
Transplant Surgery  Inpatient Daily Progress Note  08/21/2021    Assessment & Plan: 55 year old female with 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 Transplant aborted due to episode of VT requiring cardioversion and hypotension requiring pressors. Patient transferred to SICU for hemodynamic monitoring. Cardiology consulting. ID consulted. Panc/biliary team consulted.     Cardiorespiratory: Management per SICU  Hypotension secondary to shock: Improved. Decreased to 2 pressors. MAP goal > 65. Last lactic acid 1.3. Volume replacement 1:1 drain output:albumin 5%.  Ventricular tachycardia: EP Cards consulting. Continue amiodarone gtt. K > 4 and Mg > 2. Telemetry. Likely not primarily a cardiac issue, but rather sepsis, electrolyte abnormalities. Troponin trending down .  Post operative respiratory failure: Mech vent. Management per SICU  Hematology: 8/19 INR 1.51, PLT wnl  Anemia: Hgb 8.8 (8.4)  GI/Nutrition: NPO. PPI  PSC/UC: Ascites, surgery drain output ~3L yesterday and ~900 since midnight. Change drain to gravity drainage. Replacing drain output with albumin 5%. Recommend hepatology consult.   Fluid/Electrolytes: Mg 2.4  GONZALO: Cr 1.14. UO 15-30 ml/hr. K 4. Keep I:O. Avoid nephrotoxins.   Endocrine: Stress/steroid hyperglycemia. sliding scale insulin every 4 hours  Hydrocortisone 100 mg q8hr.  Neuro:  Sedation, pain control: Propofol, fentanyl  : [place]. Strict I & O  Infectious disease: Afebrile, WBC 34.2->31.3->20.   Septic shock: Consulted ID. Fluid gram stain negative. Fluid cx and  blood cx in process, no growth. On Meropenem. Vanco and micafungin stopped.   7/22 ERCP with stent placement. GI consulted due to concern for sepsis, evaluate for cholangitis --would hold on procedure due to hypotension.   Prophylaxis: DVT (heparin SQ), GI (PPI)  Activity: PT/OT  Disposition: SICU    Medical Decision Making: High  Subsequent  "visit 13640 (high level decision making)    [place]/Fellow/Resident Provider: Kelly Salas PA-C    Faculty: Ernesto Schmitt M.D.    __________________________________________________________________  Transplant History: Admitted 8/18/2021 for liver transplant.  N/A, Postoperative day:      Interval History:  Stable on vent, Fio2 decreased. Low UO. On 2 pressors.     ROS:   A 10-point review of systems was negative except as noted above.    Meds:    hydrocortisone sodium succinate PF  100 mg Intravenous Q8H     insulin aspart  1-6 Units Subcutaneous Q4H     meropenem  1 g Intravenous Q8H     pantoprazole (PROTONIX) IV  40 mg Intravenous Daily with breakfast     vancomycin  1,250 mg Intravenous Q24H       Physical Exam:     Admit Weight: 59.5 kg (131 lb 2.8 oz)    Current vitals:   /66 (BP Location: Left arm)   Pulse 57   Temp 98.6  F (37  C)   Resp 16   Ht 1.651 m (5' 5\")   Wt 66.2 kg (145 lb 15.1 oz)   SpO2 98%   BMI 24.29 kg/m        Vital sign ranges:    Temp:  [98.1  F (36.7  C)-98.6  F (37  C)] 98.6  F (37  C)  Pulse:  [54-67] 57  Resp:  [16] 16  MAP:  [63 mmHg-83 mmHg] 73 mmHg  Arterial Line BP: ()/(44-64) 110/53  FiO2 (%):  [45 %] 45 %  SpO2:  [97 %-100 %] 98 %  Patient Vitals for the past 24 hrs:   Temp Temp src Pulse Resp SpO2 Weight   08/21/21 0615 98.6  F (37  C) -- 57 -- 98 % --   08/21/21 0600 98.6  F (37  C) -- 57 -- 98 % --   08/21/21 0545 98.6  F (37  C) -- 56 -- 98 % --   08/21/21 0530 98.6  F (37  C) -- 57 -- 98 % --   08/21/21 0515 98.6  F (37  C) -- 56 -- 98 % --   08/21/21 0500 98.6  F (37  C) -- 57 -- 98 % --   08/21/21 0445 98.6  F (37  C) -- 63 -- 97 % --   08/21/21 0430 98.4  F (36.9  C) -- 55 -- 97 % --   08/21/21 0415 98.4  F (36.9  C) Bladder 55 -- 97 % --   08/21/21 0400 98.4  F (36.9  C) -- 55 -- 97 % --   08/21/21 0345 98.4  F (36.9  C) -- 55 -- 97 % --   08/21/21 0330 98.4  F (36.9  C) -- 55 -- 97 % --   08/21/21 0315 98.2  F (36.8  C) -- 55 -- 98 % -- "   08/21/21 0300 98.4  F (36.9  C) -- 54 -- 97 % --   08/21/21 0245 98.4  F (36.9  C) -- 55 -- 97 % --   08/21/21 0230 98.4  F (36.9  C) -- 56 -- 97 % --   08/21/21 0215 98.6  F (37  C) -- 58 -- 97 % --   08/21/21 0200 98.6  F (37  C) -- 58 -- 99 % 66.2 kg (145 lb 15.1 oz)   08/21/21 0145 98.6  F (37  C) -- 56 -- 99 % --   08/21/21 0130 98.6  F (37  C) -- 56 -- 98 % --   08/21/21 0115 98.6  F (37  C) -- 57 -- 98 % --   08/21/21 0100 98.6  F (37  C) -- 57 -- 98 % --   08/21/21 0045 98.6  F (37  C) -- 56 -- 98 % --   08/21/21 0030 98.6  F (37  C) -- 56 -- 98 % --   08/21/21 0015 98.6  F (37  C) -- 56 -- 98 % --   08/21/21 0000 98.6  F (37  C) -- 56 -- 99 % --   08/20/21 2345 98.6  F (37  C) Bladder 57 -- 100 % --   08/20/21 2330 98.6  F (37  C) -- 56 -- 99 % --   08/20/21 2315 98.6  F (37  C) -- 55 -- 99 % --   08/20/21 2300 98.6  F (37  C) -- 56 -- 100 % --   08/20/21 2245 98.4  F (36.9  C) -- 55 -- 99 % --   08/20/21 2230 98.4  F (36.9  C) -- 58 -- 99 % --   08/20/21 2215 98.4  F (36.9  C) -- 56 -- 99 % --   08/20/21 2200 98.4  F (36.9  C) -- 56 -- 99 % --   08/20/21 2145 98.4  F (36.9  C) -- 56 -- 99 % --   08/20/21 2130 98.4  F (36.9  C) -- 56 -- 99 % --   08/20/21 2115 98.4  F (36.9  C) -- 56 -- 99 % --   08/20/21 2108 -- -- -- -- 100 % --   08/20/21 2100 98.4  F (36.9  C) -- 56 -- 99 % --   08/20/21 2045 98.4  F (36.9  C) -- 56 -- 99 % --   08/20/21 2030 98.4  F (36.9  C) -- 56 -- 99 % --   08/20/21 2015 98.4  F (36.9  C) -- 56 -- 99 % --   08/20/21 2000 98.2  F (36.8  C) -- 58 -- 99 % --   08/20/21 1945 98.2  F (36.8  C) -- 57 -- 98 % --   08/20/21 1930 98.2  F (36.8  C) Bladder 56 -- 99 % --   08/20/21 1915 98.2  F (36.8  C) -- 55 -- 99 % --   08/20/21 1900 98.4  F (36.9  C) -- 56 -- 99 % --   08/20/21 1830 98.4  F (36.9  C) -- 55 -- 99 % --   08/20/21 1800 98.4  F (36.9  C) -- 56 16 98 % --   08/20/21 1730 98.6  F (37  C) -- 57 -- 98 % --   08/20/21 1700 98.4  F (36.9  C) -- 57 16 98 % --   08/20/21 1645 98.4   F (36.9  C) -- 57 -- 98 % --   08/20/21 1615 98.2  F (36.8  C) -- 56 -- 98 % --   08/20/21 1600 98.2  F (36.8  C) Bladder 57 16 98 % --   08/20/21 1545 98.2  F (36.8  C) -- 56 -- 98 % --   08/20/21 1530 98.4  F (36.9  C) -- 57 -- 98 % --   08/20/21 1500 98.4  F (36.9  C) -- 57 16 97 % --   08/20/21 1445 98.4  F (36.9  C) -- 60 -- 97 % --   08/20/21 1430 98.2  F (36.8  C) -- 61 -- 98 % --   08/20/21 1415 98.2  F (36.8  C) -- 58 -- 99 % --   08/20/21 1400 98.2  F (36.8  C) -- 57 16 97 % --   08/20/21 1345 98.2  F (36.8  C) -- 58 -- 98 % --   08/20/21 1330 98.2  F (36.8  C) -- 58 -- 97 % --   08/20/21 1315 98.2  F (36.8  C) -- 58 -- 97 % --   08/20/21 1300 98.4  F (36.9  C) -- 59 16 98 % --   08/20/21 1230 98.4  F (36.9  C) -- 59 -- 97 % --   08/20/21 1215 98.4  F (36.9  C) -- 62 -- 98 % --   08/20/21 1200 98.4  F (36.9  C) Bladder 60 16 98 % --   08/20/21 1145 98.2  F (36.8  C) -- 60 -- 97 % --   08/20/21 1130 98.1  F (36.7  C) -- 62 -- 98 % --   08/20/21 1115 98.1  F (36.7  C) -- 62 -- 97 % --   08/20/21 1100 98.1  F (36.7  C) -- 61 16 97 % --   08/20/21 1015 -- -- 63 -- 97 % --   08/20/21 1000 98.1  F (36.7  C) -- 63 16 97 % --   08/20/21 0945 -- -- 62 -- 97 % --   08/20/21 0930 -- -- 64 -- 97 % --   08/20/21 0915 98.4  F (36.9  C) -- 63 -- 97 % --   08/20/21 0900 98.4  F (36.9  C) -- 65 16 97 % --   08/20/21 0845 98.6  F (37  C) -- 66 -- 97 % --   08/20/21 0830 98.6  F (37  C) -- 67 -- 98 % --   08/20/21 0815 98.6  F (37  C) -- 67 -- 97 % --     General Appearance: NAD   Skin: no rash  Heart: NSR, PACs  Lungs: vent  Abdomen: soft, chevron incision surgical dressing, shadowing. ROMA, serous output.  : ferguson is present.    Extremities: no edema  Neurologic: sedated     Data:   CMP  Recent Labs   Lab 08/21/21  0422 08/21/21  0417 08/20/21  2357 08/20/21  0859 08/20/21  0849 08/20/21  0418 08/20/21  0418 08/19/21  1316 08/19/21  1222 08/19/21  1222 08/18/21  2132   NA  --  135  --   --  135  --  133 133   < > 130*  126*   POTASSIUM  --  4.0  4.0  --   --  4.0  --  4.0 3.5   < > 3.9 3.9   CHLORIDE  --  107  --   --  106  --  104  --    < >  --  98   CO2  --  19*  --   --  18*  --  14*  --    < >  --  20   * 150*  --   --  176*   < > 199* 128*   < > 122* 100*   BUN  --  39*  --   --  25  --  24  --    < >  --  28   CR  --  1.14*  --   --  0.82  --  0.78  --    < >  --  0.65   GFRESTIMATED  --  54*  --   --  80  --  85  --    < >  --  >90   MARIELENA  --  9.0  --   --  8.7  --  9.4  --    < >  --  8.6   ICAW  --   --   --   --   --   --   --  4.6  --  4.7  --    MAG  --  2.4*  --   --   --   --  2.3  --    < >  --  2.0   PHOS  --  4.6*  --   --   --   --  4.6*  --    < >  --  3.7   AMYLASE  --   --   --   --   --   --   --   --   --   --  68   ALBUMIN  --  4.3 4.5   < > 3.9  --   --   --    < >  --  2.0*   BILITOTAL  --  3.5* 4.0*   < > 5.1*  --   --   --    < >  --  3.6*   ALKPHOS  --  150 151*   < > 196*  --   --   --    < >  --  400*   AST  --  43 49*   < > 59*  --   --   --    < >  --  66*   ALT  --  26 26   < > 28  --   --   --    < >  --  36    < > = values in this interval not displayed.     CBC  Recent Labs   Lab 08/21/21 0417 08/20/21  0724 08/20/21 0419   HGB 8.4*  8.4* 8.5* 10.3*  10.3*   WBC 20.0*  --  31.3*     --  405     COAGS  Recent Labs   Lab 08/19/21  1839 08/19/21  1424 08/18/21  2132   INR 1.51* 1.61* 1.45*   PTT  --   --  30      Urinalysis  Recent Labs   Lab Test 08/19/21  2135 08/19/21  0513 05/21/21  0730   COLOR Yellow Dark Yellow* Dark Yellow   APPEARANCE Slightly Cloudy* Clear Clear   URINEGLC Negative Negative Negative   URINEBILI Small* Small* Negative   URINEKETONE Negative Negative Negative   SG 1.025 1.019 1.031   UBLD Trace* Negative Negative   URINEPH 5.0 5.5 6.0   PROTEIN 10 * Negative 20*   NITRITE Negative Negative Negative   LEUKEST Negative Negative Negative   RBCU 6* <1 1   WBCU 2 1 4   UTPG  --   --  0.26*

## 2021-08-21 NOTE — PROGRESS NOTES
SURGICAL ICU PROGRESS NOTE  2021        Date of Service (when I saw the patient): 2021    ASSESSMENT: Berta Nava is a 55 year old female with 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 She presented in stable condition from home for potential liver transplant. Patient was taken to the OR. Intraop, abdomen was opened, ascites was evacuated and falciform and left triangular ligament divided. At this time, patient developed vtach requiring 5-6 rounds of debrillation and the procedure was aborted. Consideration for starting ECMO, but patient converted out. Patient incisions closed. Taken to CT for concern for PE. CT showed no signs of PE. Comes to SICU  due to unstable hemodynamics on 3 pressors. Overnight, angiotensin II was added due to hypotension.      CHANGES and MAJOR THINGS TODAY:   - discontinue Vancomycin  - discontinue epinephrine  - discontinue angiotensin  - PST today  - MAP goal > 55  - OK to change labs to q 12 hour   - Hepatology Consult  - change amiodarone to po taper  - Lovenox 40 mg x 1  - Lasix 20 mg x 1      PLAN:  Neuro/Pain/Sedation:  #Post operative sedation and pain control   RASS at -1 on exam this AM.follows commands  -Sedation: Propofol gtt, wean as able for PST  -Pain: Fentanyl gtt  -Monitor neurological status. Notify the MD/DO for any acute changes in exam.    Pulmonary:  #Post operative respiratory failure   #Intubated and mechanically ventilated   -Mechanically Ventilated AC 16/400/5/45%   -AB.33/35/115/18              metabolic acidosis   - PST today, ERCP tomorrow - would recommend PST again for possible extubation    Cardiovascular:  #Intraoperative V tach requiring 4 rounds of defibrillation   #Distributive vs hypovolemic shock   -Patient had wnl stress ECHO pre-op with EF 60-65%  -Intraoperative v tach requiring defibrillation, came to SICU on 3 pressors             NE,  Epi, Vaso  -Stat TTE post op : Global and regional left ventricular function is normal with an EF of 60-65%. Global right ventricular function is normal. No Effusion  -post VT EKG wnl   -Patient weaned down to 2 pressors    Angiotensin II, Epi - off   Norepi 0.05-0.1   Vasopressin 2.4  - MAP goal > 55 after ERCP  -replacing ROMA output 1:1 Q3hr with albumin  -Replete potassium and magnesium   -Change amiodarone from gtt --> po taper now that she is off pressors    -EP consult               Obtain cardiac MRI---wait on this now   -[place] removed    -Lactate peak 4.8 on - downtrend to 1.3 now  -Troponin peak at 0.328 - downtrend to 0.242    GI/Nutrition:   # Hepatic Cirrhosis Secondary to PSC/UC,   - NPO  - NG confirmed  - Incision minimal oozing on dressings   - ROMA drain x1- output 3000 ml yesterday   - Will trend LFT   - Plan for  ERCP (NPO at [facility])      Renal/Fluids/Electrolytes:  #Distributive vs hypovolemic Shock   #Oliguria   # GONZALO (BL Creat 0.7)  -Patient with labile blood pressures, stabilized after adding angiotensin II, now down to 2 pressors- hypovolemic vs distributive shock  -Changed 1:1 drain replacement with albumin to Q3 due to increased output   -Electrolytes wnl   -Creat 1.14 (0.82)  - BUN 39 (25)  -Oliguric with 15-25 out per hour [net 207 this AM]      Endocrine:  -B-176  - sliding scale insulin: 2 units used yesterday      ID/Antibiotics:  -Afebrile-T max 99   -WBC 20 (31.3)  -Septic appearing---started Meropenem, Micafungin, Vancomycin  -MRSA Swab: negative so can discontinue Vancomycin  -OR fluid cultures NGTD  -Blood cultures NGTD  -Sputum cultures NGTD   -UA with trace blood, mucus, hyaline casts-volume depletion   -EBV IgG positive but IgM not reactive      Heme:   #Acute blood loss anemia    -Transfusion: 3 units intraoperative   -Goals: Hb > 8, Fibrinogen >200, INR < 2, plts > 50   -Hb 8.4 (10.3)  -Continue to trend Hb   -plts 269 (405)   -INR 1.51  (1.61)      Prophylaxis:  -DVT: mechanical SCD  - PPI      MSK:  -PT and OT to be consulted once patient is less sedated      Lines/ tubes/ drains:  ETT, Peripheral IV x3, arterial line, RIJ MAC x 2, NG, ferguson, JPx1     Disposition:  -Surgical ICU      Patient seen, findings and plan discussed with surgical ICU staff, Dr. Regalado.    Time spent on this Encounter   Billing:  I spent 50 minutes bedside and on the inpatient unit today managing the critical care of Berta Nava in relation to the issues listed in this note.      CHELSEY Pelletier, RN     ====================================  INTERVAL HISTORY:   Patient weaned from 4 pressors last night to 2 pressors this AM, improving.   Patient following commands in all extremities, FiO2 turned down to 30%, low UOP,    ROS SOLIS d/t sedation      OBJECTIVE:   1. VITAL SIGNS:   Temp:  [98.1  F (36.7  C)-99  F (37.2  C)] 98.6  F (37  C)  Pulse:  [54-72] 57  Resp:  [14-16] 16  MAP:  [63 mmHg-83 mmHg] 73 mmHg  Arterial Line BP: ()/(44-64) 110/53  FiO2 (%):  [45 %] 45 %  SpO2:  [97 %-100 %] 98 %  Ventilation Mode: CMV/AC  (Continuous Mandatory Ventilation/ Assist Control)  FiO2 (%): 45 %  Rate Set (breaths/minute): 16 breaths/min  Tidal Volume Set (mL): 400 mL  PEEP (cm H2O): 5 cmH2O  Oxygen Concentration (%): 45 %  Peak Inspiratory Pressure (cm H2O) (Drager [place]): 16  Resp: 16      2. INTAKE/ OUTPUT:   I/O last 3 completed shifts:  In: 6527.9 [I.V.:2747.9; NG/GT:30; IV Piggyback:750]  Out: 4089 [Urine:479; Emesis/NG output:200; Drains:3410]    3. PHYSICAL EXAMINATION:  General: ill-appearing woman, following commands,   HEENT:ETT secured in place,   Neuro: sedated, NAD, following commands   Pulm/Resp: Coarse LS that improve with suction, vented, thin secretions   CV: RRR, S1. S2 present, no murmur appreciated   Abdomen: Soft, mildly distended, non-tender, no rebound tenderness or guarding, no masses  :  ferguson catheter in place, urine yellow and  clear  Incisions/Skin: no jaundice,   MSK/Extremities: trace peripheral edema, moving all extremities, peripheral pulses intact, calves soft, extremities well perfused    4. INVESTIGATIONS:   Arterial Blood Gases   Recent Labs   Lab 08/20/21  0851 08/20/21 0421 08/19/21 2347 08/19/21  1838   PH 7.33* 7.31* 7.32* 7.34*   PCO2 35 30* 29* 33*   PO2 115* 101 90 102   HCO3 18* 15* 15* 18*     Complete Blood Count   Recent Labs   Lab 08/21/21 0417 08/20/21 2357 08/20/21 1953 08/20/21  1533 08/20/21 0419 08/19/21 2347 08/19/21  1839 08/19/21  1424   WBC  --   --   --   --  31.3* 34.2* 42.3* 29.2*   HGB 8.4* 8.5* 8.5* 8.3* 10.3*  10.3* 10.2*  10.2* 11.2* 11.1*   PLT  --   --   --   --  405 405 466* 422     Basic Metabolic Panel  Recent Labs   Lab 08/21/21 0422 08/21/21 0417 08/21/21  0005 08/20/21 2002 08/20/21  1707 08/20/21  0849 08/20/21 0418 08/20/21 0418 08/20/21  0017 08/19/21  1839   NA  --   --   --   --   --  135  --  133 134 133   POTASSIUM  --  4.0  --   --   --  4.0  --  4.0 3.7 4.5   CHLORIDE  --   --   --   --   --  106  --  104 104 103   CO2  --   --   --   --   --  18*  --  14* 17* 17*   BUN  --   --   --   --   --  25  --  24 22 19   CR  --   --   --   --   --  0.82  --  0.78 0.70 0.57   *  --  147* 149* 147* 176*   < > 199* 164* 141*    < > = values in this interval not displayed.     Liver Function Tests  Recent Labs   Lab 08/21/21 0417 08/20/21 2357 08/20/21 1953 08/20/21  1533 08/19/21  1839 08/19/21  1424 08/18/21 2132   AST 43 49* 50* 52*  --  51* 66*   ALT 26 26 25 24  --  27 36   ALKPHOS 150 151* 164* 157*  --  276* 400*   BILITOTAL 3.5* 4.0* 4.3* 4.5*  --  3.8* 3.6*   ALBUMIN 4.3 4.5 4.4 4.2  --  3.0* 2.0*   INR  --   --   --   --  1.51* 1.61* 1.45*     Pancreatic Enzymes  Recent Labs   Lab 08/18/21 2132   AMYLASE 68     Coagulation Profile  Recent Labs   Lab 08/19/21 1839 08/19/21  1424 08/18/21 2132   INR 1.51* 1.61* 1.45*   PTT  --   --  30         5. RADIOLOGY:    Recent Results (from the past 24 hour(s))   XR Chest Port 1 View    Narrative    EXAM: XR CHEST PORT 1 VIEW  8/20/2021 9:08 AM     HISTORY:  eval for PNA       COMPARISON:  Chest radiograph 8/19/2021    FINDINGS:   AP portable chest. Endotracheal tube is minimally retracted with  distal tip approximately 5.8 cm cephalad to the caryn. 2 right IJ  catheters are seen with distal tips projecting over the proximal and  distal SVC. Enteric tube is seen with sidehole projecting over the  left upper quadrant with distal tip projecting outside the  field-of-view. Multiple biliary stents are partially visualized over  the right upper quadrant.    Trachea is midline. Improved aeration of the bilateral lung fields  with decreased perihilar opacities. No new focal consolidative  opacities. No pleural effusion or pneumothorax. Stable  cardiomediastinal silhouette. Bones are grossly intact.      Impression    IMPRESSION:   1. Endotracheal tube now projects approximately 5.8 cm cephalad to the  caryn.  2. Decreased perihilar opacities, favoring atelectasis. No new focal  consolidative opacities.    I have personally reviewed the examination and initial interpretation  and I agree with the findings.    ASHLY AYERS MD         SYSTEM ID:  [id number]       =========================================

## 2021-08-21 NOTE — PROGRESS NOTES
Pt post Vfib arrest during initiation of liver transplant, redistributive shock, in SICU. Has three biliary stents placed one month ago for intractable PSC.and cirrhosis.   Initially planned ERCP today while still intubated. Called by [facility] in charge, who felt she was unstable for anesthesia/ERCP today. I spoke w Dr Regalado SICU staff, relayed that pt has come down from 4 pressors last night to two today. Blood cultures from 2 days ago negative so far. Thus feels pt is improving, and deferred to Dr Schmitt of transplant surgery re urgency of ERCP. We discussed pt and agree that waiting a day only beneficial as long as patient improving, even though biliary source most probable tho not certain.   Thus rec:  1) LEAVE PT INTUBATED  2) POSTPONE ERCP until tomorrow AM around 10, give time for MDA in charge tomorrow to reassess pt.    Amadeo Singh MD  Gastroenterology, Pancreas and Biliary Disorders  [facility]    Seen and examined with GI fellow, agree with findings and recommendations.    Amadeo Singh MD GI Staff

## 2021-08-21 NOTE — ANESTHESIA PREPROCEDURE EVALUATION
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.09]   Procedure : Procedure(s):  TRANSPLANT, LIVER, RECIPIENT,  DONOR     Past Medical History:   Diagnosis Date     Ascites      Biliary cirrhosis (H)      Cholangitis, sclerosing      Cirrhosis of liver with ascites (H) 3/3/2021     Hearing loss of left ear     wears a hearing aide     History of low potassium      Hyperlipidemia      Hypertension      SBP (spontaneous bacterial peritonitis) (H) 2021     Sjogren's syndrome (H)      Ulcerative colitis (H)      Ulcerative pancolitis (H)       Past Surgical History:   Procedure Laterality Date     ENDOSCOPIC RETROGRADE CHOLANGIOPANCREATOGRAM N/A 2021    Procedure: ENDOSCOPIC RETROGRADE CHOLANGIOPANCREATOGRAPHY with ballon sweep of bile ducts for stones, balloon dilation of bile ducts and bile duct stent placement;  Surgeon: Gregory Gabriel MD;  Location: [facility] OR     ENDOSCOPIC RETROGRADE CHOLANGIOPANCREATOGRAM N/A 2021    Procedure: ENDOSCOPIC RETROGRADE CHOLANGIOPANCREATOGRAPHY STONE REMOVAL, DILATION AND STENT PLACEMENT;  Surgeon: Gregory Gabriel MD;  Location:  OR     ENDOSCOPIC RETROGRADE CHOLANGIOPANCREATOGRAPHY, EXCHANGE TUBE/STENT N/A 2021    Procedure: ENDOSCOPIC RETROGRADE CHOLANGIOPANCREATOGRAPHY WITH STENT EXCHANGE, STONE EXTRACTION, AND DILATION;  Surgeon: Gregory Gabriel MD;  Location: [facility]     ENDOSCOPIC RETROGRADE CHOLANGIOPANCREATOGRAPHY, EXCHANGE TUBE/STENT N/A 5/10/2021    Procedure: ENDOSCOPIC RETROGRADE CHOLANGIOPANCREATOGRAPHY with biliary dilation, stone removal, stent exchange;  Surgeon: Gregory Gabriel MD;  Location: [facility]     ENDOSCOPIC RETROGRADE CHOLANGIOPANCREATOGRAPHY, EXCHANGE TUBE/STENT N/A 6/10/2021    Procedure: ENDOSCOPIC RETROGRADE CHOLANGIOPANCREATOGRAPHY, WITH biliary stent exchange, stone removal;  Surgeon: Woodrow Lott MD;  [facility]      GYN SURGERY      bilat fallopian tubes and ovaries removed     PICC DOUBLE LUMEN PLACEMENT Right 2021    42cm (2cm external), Lateral brachial vein     TRANSPLANT LIVER RECIPIENT  DONOR N/A 2021    Procedure: Opening of abdomen, Abdominal Exploration and aborted liver transplant.;  Surgeon: Ernesto Schmitt MD;  Location: [facility] OR      Allergies   Allergen Reactions     Diagnostic X-Ray Materials Hives     PATIENT HAD HIVE REACTION AFTER ADMINISTERING CT CONTRAST DYE.       Contrast Dye       Social History     Tobacco Use     Smoking status: Never Smoker     Smokeless tobacco: Never Used   Substance Use Topics     Alcohol use: No     Comment: Last drink was 2017      Wt Readings from Last 1 Encounters:   21 63.1 kg (139 lb 1.8 oz)        Anesthesia Evaluation   Pt has had prior anesthetic. Type: General and MAC.        ROS/MED HX  ENT/Pulmonary:  - neg pulmonary ROS   (+) sleep apnea,     Neurologic:  - neg neurologic ROS     Cardiovascular:     (+) hypertension-----Previous cardiac testing   Echo: Date: 21 Results:  Global and regional left ventricular function is normal with an EF of 60-65%.  Global right ventricular function is normal. The right ventricle is normal  size.Global and regional left ventricular function is normal with an EF of 60-65%.  Global right ventricular function is normal. The right ventricle is normal  size.  No significant valvular abnormalities.  IVC diameter <2.1 cm collapsing >50% with sniff suggests a normal RA pressure  of 3 mmHg.  Ascites is noted.  This study was compared with the study from 2021 (resting tomograms from  stress study). No significant changes noted.  No significant valvular abnormalities.  IVC diameter <2.1 cm collapsing >50% with sniff suggests a normal RA pressure  of 3 mmHg.  Ascites is noted.  This study was compared with the study from 2021 (resting tomograms from  stress study). No significant changes  noted.  Stress Test: Date: Results:    ECG Reviewed: Date: Results:    Cath: Date: Results:   Pulmonary hypertension: 5/4/21.   METS/Exercise Tolerance:     Hematologic:  - neg hematologic  ROS     Musculoskeletal:  - neg musculoskeletal ROS     GI/Hepatic: Comment: Primary Sclerosing Cholangitis, Ulcerative pancolitis, ascites/ cirrhosis. Esophageal varices without bleeding.    (+) liver disease,     Renal/Genitourinary:       Endo:  - neg endo ROS     Psychiatric/Substance Use:  - neg psychiatric ROS     Infectious Disease: Comment: Admited 4/29/21 with sepsis, SBP, and cholangitis s/p stent placement.  - neg infectious disease ROS     Malignancy:  - neg malignancy ROS     Other:            Physical Exam    Airway        Mallampati: II   TM distance: > 3 FB   Neck ROM: full   Mouth opening: > 3 cm    Respiratory Devices and Support         Dental  no notable dental history         Cardiovascular   cardiovascular exam normal       Rhythm and rate: regular and normal     Pulmonary   pulmonary exam normal        breath sounds clear to auscultation           OUTSIDE LABS:  CBC:   Lab Results   Component Value Date    WBC 31.3 (H) 08/20/2021    WBC 34.2 (H) 08/19/2021    HGB 8.3 (L) 08/20/2021    HGB 8.9 (L) 08/20/2021    HCT 30.9 (L) 08/20/2021    HCT 29.5 (L) 08/19/2021     08/20/2021     08/19/2021     BMP:   Lab Results   Component Value Date     08/20/2021     08/20/2021    POTASSIUM 4.0 08/20/2021    POTASSIUM 4.0 08/20/2021    CHLORIDE 106 08/20/2021    CHLORIDE 104 08/20/2021    CO2 18 (L) 08/20/2021    CO2 14 (L) 08/20/2021    BUN 25 08/20/2021    BUN 24 08/20/2021    CR 0.82 08/20/2021    CR 0.78 08/20/2021     (H) 08/20/2021     (H) 08/20/2021     COAGS:   Lab Results   Component Value Date    PTT 30 08/18/2021    INR 1.51 (H) 08/19/2021    FIBR 588 (H) 08/18/2021     POC:   Lab Results   Component Value Date    BGM 95 06/25/2021     HEPATIC:   Lab Results   Component  Value Date    ALBUMIN 4.2 08/20/2021    PROTTOTAL 6.2 (L) 08/20/2021    ALT 24 08/20/2021    AST 52 (H) 08/20/2021    ALKPHOS 157 (H) 08/20/2021    BILITOTAL 4.5 (H) 08/20/2021    MIHIR 48 06/09/2021     OTHER:   Lab Results   Component Value Date    PH 7.33 (L) 08/20/2021    LACT 1.5 08/20/2021    A1C 4.3 04/29/2021    MARIELENA 8.7 08/20/2021    PHOS 4.6 (H) 08/20/2021    MAG 2.3 08/20/2021    LIPASE 241 06/25/2021    AMYLASE 68 08/18/2021    CRP 40.0 (H) 08/19/2021       Anesthesia Plan    ASA Status:  4, emergent    NPO Status:  NPO Appropriate    Anesthesia Type: General.     - Airway: ETT   Induction: RSI.   Maintenance: Inhalation.   Techniques and Equipment:     - Lines/Monitors: 2nd IV, Arterial Line, Central Line, BIS, CVP     - Blood: Blood in Room, PLT, FFP, PRBC     - Drips/Meds: Norepi, Vasopressin, Epinephrine     Consents    Anesthesia Plan(s) and associated risks, benefits, and realistic alternatives discussed. Questions answered and patient/representative(s) expressed understanding.     - Discussed with:  Patient      - Extended Intubation/Ventilatory Support Discussed: Yes.      - Patient is DNR/DNI Status: No    Use of blood products discussed: Yes.     - Discussed with: Patient.     - Consented: consented to blood products            Reason for refusal: other.     Postoperative Care    Pain management: IV analgesics.        Comments:                    Gregory Benitez MD

## 2021-08-21 NOTE — PLAN OF CARE
Problem: Adult Inpatient Plan of Care  Goal: Plan of Care Review  Outcome: Improving     D: Pt remains in ICU following aborted liver transplant due to hemodynamic instability and VT in OR. Improved overnight and throughout shift. Weaned to 1 pressor and tolerated PS trial. ERCP canceled today, then for tomorrow after discussion with transplant team.     I/A:     Neuro: Arouses to voice, follows all commands and moving all extremities with sedation holiday. Anxious and reaching for ETT tube at times. No focal deficits. Pupils equal and reactive  Cardiac: Sinus rhythm 50-60s. Vasopressin off. MAP goal 55 per transplant and SICU teams. Currently on levophed at 0.04 mcg/kg/min. Afebrile.  Respiratory: Tolerated PS trial for 1 hour at 7/5. Lungs clear and diminished in bases. Minimal secretions via ETT.   GI: NPO. NG in place and clamped. No BM. Bowel regimen started.   : Jurado in place. Continues with minimal output despite lasix. SICU team aware.       P: Procedures canceled late in the day. No PS or extubation this evening after discussing with SICU team. Plan to PS tomorrow and possible extubation if tolerates well. Family updated at bedside and over the phone. Continue to monitor.

## 2021-08-21 NOTE — PROCEDURES
CENTRAL LINE INSERTION PROCEDURE NOTE  (NON-OR)    Procedure Date: 8/21/2021   Performing Physician: Ever Gonsalez MD    Procedure:  Insertion of Central Venous Catheter  Right   INTERNAL  JUGULAR    Indications:  SEPTIC SHOCK       Estimated Blood Loss:  5  ml  Complications: NONE     Findings:  Appropriately placed RIJ triple lumen    Procedure Details:   Procedure was done as an emergency : No  The risks, benefits, complications, treatment options, and expected outcomes were discussed with  .  The risks and potential complications of their problem and purposed procedure include but are not limited to infection, bleeding, pain,  lung puncture, the need for additional procedures, creating a complication requiring transfusion or operation, and nerve and vessel injury.  The patient/alternate (see above) concurred with the proposed plan, giving informed consent.  The site of the procedure was properly noted/marked. The patient was identified as Berta Nava with Date of Birth 1965 and the procedure verified as Insertion of Central Venous Catheter.  A Time Out was held and the above information confirmed.    Under sterile conditions the skin over the  Right   INTERNAL  JUGULAR     was prepped with betadine and then Chlorhexidine and covered with a sterile drape.  Strict sterile conditions were maintained,  Cap, mask, and sterile gloves were worn by all participants.  5 ml of Lidocaine 1% local anesthetic was infiltrated into the skin and subcutaneous tissues.  Previously placed RIJ MAC line was rewired and the catheter was removed maintaining wire access. There were no arrythmias    .  The catheter was then withdrawn.  A tripple lumen, quadruple lumen and single lumen   was then inserted into the vessel over the guide wire.  All ports were flushed with sterile solution and had good non-pulsatile blood return.  The catheter was sutured into place and an occlusive sterile dressing applied.  In addition  to pressure, a purse string suture was placed to aide in hemostasis. The patient tolerated the procedure well with no change in vital signs.     Number of attempts:  1     A chest x-ray was ordered and reviewed by myself confirming good catheter position and no pnx.      Condition: stable      ________________________________________________________________________  Ever Gonsalez MD  8/21/20216:36 PM

## 2021-08-21 NOTE — PHARMACY-VANCOMYCIN DOSING SERVICE
Pharmacy Vancomycin Note  Date of Service 2021  Patient's  1965   55 year old, female    Indication: Sepsis  Day of Therapy: 2  Current vancomycin regimen:  1250 mg IV q24h  Current vancomycin monitoring method: AUC  Current vancomycin therapeutic monitoring goal: 400-600 mg*h/L    Current estimated CrCl = Estimated Creatinine Clearance: 75.4 mL/min (based on SCr of 0.82 mg/dL).    Creatinine for last 3 days  2021:  9:32 PM Creatinine 0.65 mg/dL  2021:  2:24 PM Creatinine 0.47 mg/dL;  6:39 PM Creatinine 0.57 mg/dL  2021: 12:17 AM Creatinine 0.70 mg/dL;  4:18 AM Creatinine 0.78 mg/dL;  8:49 AM Creatinine 0.82 mg/dL    Recent Vancomycin Levels (past 3 days)  2021:  7:53 PM Vancomycin 12.5 mg/L    Vancomycin IV Administrations (past 72 hours)                   vancomycin 1250 mg in 0.9% NaCl 250 mL intermittent infusion 1,250 mg (mg) 1,250 mg New Bag 21 2242    vancomycin 1500 mg in 0.9% NaCl 250 ml intermittent infusion 1,500 mg (mg) 1,500 mg New Bag 21 2221    vancomycin 1250 mg in 0.9% NaCl 250 mL intermittent infusion 1,250 mg (mg) 1,250 mg New Bag 21 0601                Nephrotoxins and other renal medications (From now, onward)    Start     Dose/Rate Route Frequency Ordered Stop    21 2200  vancomycin 1250 mg in 0.9% NaCl 250 mL intermittent infusion 1,250 mg      1,250 mg  over 90 Minutes Intravenous EVERY 24 HOURS 21 1534      21 1430  vasopressin 0.2 units/mL in NS (PITRESSIN) standard conc infusion      2.4 Units/hr  12 mL/hr  Intravenous CONTINUOUS 21 1420      21 0900  norepinephrine (LEVOPHED) 16 mg in  mL infusion MAX CONC CENTRAL LINE      0.01-0.6 mcg/kg/min × 59.5 kg  0.6-33.5 mL/hr  Intravenous CONTINUOUS 21 0839               Contrast Orders - past 72 hours (72h ago, onward)    Start     Dose/Rate Route Frequency Ordered Stop    21 1400  iopamidol (ISOVUE-370) solution 52 mL      52 mL Intravenous  ONCE 08/19/21 1342 08/19/21 1355          Interpretation of levels and current regimen:  Vancomycin level is reflective of -600    Has serum creatinine changed greater than 50% in last 72 hours: No    Urine output:  diminished urine output    Renal Function: Worsening    Loading dose: N/A  Regimen: 1250 mg IV every 24 hours.  Start time: 16:42 on 08/21/2021  Exposure target: AUC24 (range)400-600 mg/L.hr   AUC24,ss: 465 mg/L.hr  Probability of AUC24 > 400: 79 %  Ctrough,ss: 12.6 mg/L  Probability of Ctrough,ss > 20: 4 %  Probability of nephrotoxicity (Lodise DENA 2009): 8 %    Plan:  1. Continue Current Dose  2. Vancomycin monitoring method: AUC  3. Vancomycin therapeutic monitoring goal: 400-600 mg*h/L  4. Pharmacy will check vancomycin levels as appropriate in 1-3 Days.  5. Serum creatinine levels will be ordered daily for the first week of therapy and at least twice weekly for subsequent weeks.    Vick Ackerman RPH

## 2021-08-21 NOTE — PLAN OF CARE
Major Shift Events:  Pt vitally stable with levo and vaso, sinus elva. Follows commands in all extremities. FiO2 turned down to 30%. Low uop, 1200mL serosanguinous from ROMA drain. Gave 1.5L albumin to replace.   Plan: Continue to monitor.  For vital signs and complete assessments, please see documentation flowsheets.

## 2021-08-21 NOTE — PROGRESS NOTES
SPIRITUAL HEALTH SERVICES  SPIRITUAL ASSESSMENT Progress Note  [facility] ([place]) 4A     REFERRAL SOURCE: Follow up    Delivered flower photos as requested by daughter Karin. Per bedside nurse Adelita, spouse Demar has left for the day.    PLAN: I will refer to Fr. Hudson for ICU support and  request assessment. Spiritual Health Services remains available for patient, family, and staff support.     Please page the on call  either via [facility] Web (Spiritual Health/[facility]) or by placing a STAT or ASAP Epic referral for Spiritual Health (which will roll to the on call pager).        Nini Page  Chaplain Resident  Pager: [phone]

## 2021-08-22 LAB
ALBUMIN SERPL-MCNC: 3.7 G/DL (ref 3.4–5)
ALBUMIN SERPL-MCNC: 4 G/DL (ref 3.4–5)
ALP SERPL-CCNC: 200 U/L (ref 40–150)
ALP SERPL-CCNC: 233 U/L (ref 40–150)
ALT SERPL W P-5'-P-CCNC: 27 U/L (ref 0–50)
ALT SERPL W P-5'-P-CCNC: 30 U/L (ref 0–50)
ANION GAP SERPL CALCULATED.3IONS-SCNC: 10 MMOL/L (ref 3–14)
ANION GAP SERPL CALCULATED.3IONS-SCNC: 11 MMOL/L (ref 3–14)
AST SERPL W P-5'-P-CCNC: 41 U/L (ref 0–45)
AST SERPL W P-5'-P-CCNC: 43 U/L (ref 0–45)
BASE EXCESS BLDA CALC-SCNC: -4.6 MMOL/L (ref -9–1.8)
BILIRUB DIRECT SERPL-MCNC: 2.1 MG/DL (ref 0–0.2)
BILIRUB DIRECT SERPL-MCNC: 2.1 MG/DL (ref 0–0.2)
BILIRUB SERPL-MCNC: 2.9 MG/DL (ref 0.2–1.3)
BILIRUB SERPL-MCNC: 3.1 MG/DL (ref 0.2–1.3)
BUN SERPL-MCNC: 58 MG/DL (ref 7–30)
BUN SERPL-MCNC: 58 MG/DL (ref 7–30)
CALCIUM SERPL-MCNC: 9.2 MG/DL (ref 8.5–10.1)
CALCIUM SERPL-MCNC: 9.5 MG/DL (ref 8.5–10.1)
CHLORIDE BLD-SCNC: 107 MMOL/L (ref 94–109)
CHLORIDE BLD-SCNC: 108 MMOL/L (ref 94–109)
CO2 SERPL-SCNC: 18 MMOL/L (ref 20–32)
CO2 SERPL-SCNC: 20 MMOL/L (ref 20–32)
CREAT SERPL-MCNC: 0.93 MG/DL (ref 0.52–1.04)
CREAT SERPL-MCNC: 1.23 MG/DL (ref 0.52–1.04)
ERYTHROCYTE [DISTWIDTH] IN BLOOD BY AUTOMATED COUNT: 16.3 % (ref 10–15)
ERYTHROCYTE [DISTWIDTH] IN BLOOD BY AUTOMATED COUNT: 16.4 % (ref 10–15)
GFR SERPL CREATININE-BSD FRML MDRD: 49 ML/MIN/1.73M2
GFR SERPL CREATININE-BSD FRML MDRD: 68 ML/MIN/1.73M2
GLUCOSE BLD-MCNC: 110 MG/DL (ref 70–99)
GLUCOSE BLD-MCNC: 122 MG/DL (ref 70–99)
GLUCOSE BLDC GLUCOMTR-MCNC: 108 MG/DL (ref 70–99)
GLUCOSE BLDC GLUCOMTR-MCNC: 108 MG/DL (ref 70–99)
GLUCOSE BLDC GLUCOMTR-MCNC: 109 MG/DL (ref 70–99)
GLUCOSE BLDC GLUCOMTR-MCNC: 115 MG/DL (ref 70–99)
GLUCOSE BLDC GLUCOMTR-MCNC: 122 MG/DL (ref 70–99)
HCO3 BLD-SCNC: 20 MMOL/L (ref 21–28)
HCT VFR BLD AUTO: 25.4 % (ref 35–47)
HCT VFR BLD AUTO: 27.1 % (ref 35–47)
HGB BLD-MCNC: 8.3 G/DL (ref 11.7–15.7)
HGB BLD-MCNC: 8.9 G/DL (ref 11.7–15.7)
INR PPP: 1.63 (ref 0.85–1.15)
INR PPP: 1.66 (ref 0.85–1.15)
LACTATE SERPL-SCNC: 0.9 MMOL/L (ref 0.7–2)
MAGNESIUM SERPL-MCNC: 2.6 MG/DL (ref 1.6–2.3)
MCH RBC QN AUTO: 29.2 PG (ref 26.5–33)
MCH RBC QN AUTO: 30.1 PG (ref 26.5–33)
MCHC RBC AUTO-ENTMCNC: 32.7 G/DL (ref 31.5–36.5)
MCHC RBC AUTO-ENTMCNC: 32.8 G/DL (ref 31.5–36.5)
MCV RBC AUTO: 89 FL (ref 78–100)
MCV RBC AUTO: 92 FL (ref 78–100)
O2/TOTAL GAS SETTING VFR VENT: 30 %
PCO2 BLD: 34 MM HG (ref 35–45)
PH BLD: 7.38 [PH] (ref 7.35–7.45)
PHOSPHATE SERPL-MCNC: 5.4 MG/DL (ref 2.5–4.5)
PLATELET # BLD AUTO: 160 10E3/UL (ref 150–450)
PLATELET # BLD AUTO: 197 10E3/UL (ref 150–450)
PO2 BLD: 106 MM HG (ref 80–105)
POTASSIUM BLD-SCNC: 3.5 MMOL/L (ref 3.4–5.3)
POTASSIUM BLD-SCNC: 3.7 MMOL/L (ref 3.4–5.3)
PROT SERPL-MCNC: 6.1 G/DL (ref 6.8–8.8)
PROT SERPL-MCNC: 6.2 G/DL (ref 6.8–8.8)
RBC # BLD AUTO: 2.84 10E6/UL (ref 3.8–5.2)
RBC # BLD AUTO: 2.96 10E6/UL (ref 3.8–5.2)
SODIUM SERPL-SCNC: 136 MMOL/L (ref 133–144)
SODIUM SERPL-SCNC: 138 MMOL/L (ref 133–144)
WBC # BLD AUTO: 13.6 10E3/UL (ref 4–11)
WBC # BLD AUTO: 17.7 10E3/UL (ref 4–11)

## 2021-08-22 PROCEDURE — 84450 TRANSFERASE (AST) (SGOT): CPT | Performed by: STUDENT IN AN ORGANIZED HEALTH CARE EDUCATION/TRAINING PROGRAM

## 2021-08-22 PROCEDURE — 85610 PROTHROMBIN TIME: CPT | Performed by: NURSE PRACTITIONER

## 2021-08-22 PROCEDURE — 94003 VENT MGMT INPAT SUBQ DAY: CPT

## 2021-08-22 PROCEDURE — 84155 ASSAY OF PROTEIN SERUM: CPT | Performed by: STUDENT IN AN ORGANIZED HEALTH CARE EDUCATION/TRAINING PROGRAM

## 2021-08-22 PROCEDURE — 83735 ASSAY OF MAGNESIUM: CPT | Performed by: NURSE PRACTITIONER

## 2021-08-22 PROCEDURE — 250N000013 HC RX MED GY IP 250 OP 250 PS 637: Performed by: NURSE PRACTITIONER

## 2021-08-22 PROCEDURE — 250N000011 HC RX IP 250 OP 636: Performed by: NURSE PRACTITIONER

## 2021-08-22 PROCEDURE — 82803 BLOOD GASES ANY COMBINATION: CPT

## 2021-08-22 PROCEDURE — 85027 COMPLETE CBC AUTOMATED: CPT | Performed by: NURSE PRACTITIONER

## 2021-08-22 PROCEDURE — 99024 POSTOP FOLLOW-UP VISIT: CPT | Performed by: TRANSPLANT SURGERY

## 2021-08-22 PROCEDURE — 250N000011 HC RX IP 250 OP 636: Performed by: TRANSPLANT SURGERY

## 2021-08-22 PROCEDURE — 99233 SBSQ HOSP IP/OBS HIGH 50: CPT | Performed by: INTERNAL MEDICINE

## 2021-08-22 PROCEDURE — 250N000011 HC RX IP 250 OP 636: Performed by: INTERNAL MEDICINE

## 2021-08-22 PROCEDURE — 99291 CRITICAL CARE FIRST HOUR: CPT | Mod: GC | Performed by: INTERNAL MEDICINE

## 2021-08-22 PROCEDURE — 999N000157 HC STATISTIC RCP TIME EA 10 MIN

## 2021-08-22 PROCEDURE — C9113 INJ PANTOPRAZOLE SODIUM, VIA: HCPCS | Performed by: NURSE PRACTITIONER

## 2021-08-22 PROCEDURE — 250N000013 HC RX MED GY IP 250 OP 250 PS 637: Performed by: STUDENT IN AN ORGANIZED HEALTH CARE EDUCATION/TRAINING PROGRAM

## 2021-08-22 PROCEDURE — 84100 ASSAY OF PHOSPHORUS: CPT | Performed by: NURSE PRACTITIONER

## 2021-08-22 PROCEDURE — 999N000015 HC STATISTIC ARTERIAL MONITORING DAILY

## 2021-08-22 PROCEDURE — 80053 COMPREHEN METABOLIC PANEL: CPT | Performed by: STUDENT IN AN ORGANIZED HEALTH CARE EDUCATION/TRAINING PROGRAM

## 2021-08-22 PROCEDURE — 250N000011 HC RX IP 250 OP 636

## 2021-08-22 PROCEDURE — 200N000002 HC R&B ICU UMMC

## 2021-08-22 PROCEDURE — 80053 COMPREHEN METABOLIC PANEL: CPT | Performed by: NURSE PRACTITIONER

## 2021-08-22 PROCEDURE — 83605 ASSAY OF LACTIC ACID: CPT

## 2021-08-22 RX ORDER — DEXTROSE MONOHYDRATE 25 G/50ML
25-50 INJECTION, SOLUTION INTRAVENOUS
Status: DISCONTINUED | OUTPATIENT
Start: 2021-08-22 | End: 2021-09-09 | Stop reason: HOSPADM

## 2021-08-22 RX ORDER — DEXTROSE MONOHYDRATE 100 MG/ML
INJECTION, SOLUTION INTRAVENOUS CONTINUOUS PRN
Status: DISCONTINUED | OUTPATIENT
Start: 2021-08-22 | End: 2021-08-23

## 2021-08-22 RX ORDER — POTASSIUM CHLORIDE 7.45 MG/ML
10 INJECTION INTRAVENOUS
Status: COMPLETED | OUTPATIENT
Start: 2021-08-22 | End: 2021-08-22

## 2021-08-22 RX ORDER — AMINO AC/PROTEIN HYDR/WHEY PRO 10G-100/30
1 LIQUID (ML) ORAL 2 TIMES DAILY
Status: DISCONTINUED | OUTPATIENT
Start: 2021-08-22 | End: 2021-08-23

## 2021-08-22 RX ORDER — NICOTINE POLACRILEX 4 MG
15-30 LOZENGE BUCCAL
Status: DISCONTINUED | OUTPATIENT
Start: 2021-08-22 | End: 2021-09-09 | Stop reason: HOSPADM

## 2021-08-22 RX ORDER — POTASSIUM CHLORIDE 29.8 MG/ML
20 INJECTION INTRAVENOUS ONCE
Status: COMPLETED | OUTPATIENT
Start: 2021-08-22 | End: 2021-08-22

## 2021-08-22 RX ORDER — LIDOCAINE 40 MG/G
CREAM TOPICAL
Status: CANCELLED | OUTPATIENT
Start: 2021-08-22

## 2021-08-22 RX ORDER — MEROPENEM 1 G/1
1 INJECTION, POWDER, FOR SOLUTION INTRAVENOUS EVERY 12 HOURS
Status: DISCONTINUED | OUTPATIENT
Start: 2021-08-22 | End: 2021-08-23

## 2021-08-22 RX ADMIN — PROPOFOL 30 MCG/KG/MIN: 10 INJECTION, EMULSION INTRAVENOUS at 03:20

## 2021-08-22 RX ADMIN — POTASSIUM CHLORIDE 10 MEQ: 7.46 INJECTION, SOLUTION INTRAVENOUS at 08:35

## 2021-08-22 RX ADMIN — SENNOSIDES 8.6 MG: 8.6 TABLET ORAL at 08:35

## 2021-08-22 RX ADMIN — MEROPENEM 1 G: 1 INJECTION, POWDER, FOR SOLUTION INTRAVENOUS at 20:18

## 2021-08-22 RX ADMIN — AMIODARONE HYDROCHLORIDE 400 MG: 200 TABLET ORAL at 20:18

## 2021-08-22 RX ADMIN — AMIODARONE HYDROCHLORIDE 400 MG: 200 TABLET ORAL at 08:35

## 2021-08-22 RX ADMIN — SENNOSIDES 8.6 MG: 8.6 TABLET ORAL at 20:18

## 2021-08-22 RX ADMIN — MEROPENEM 1 G: 1 INJECTION, POWDER, FOR SOLUTION INTRAVENOUS at 00:57

## 2021-08-22 RX ADMIN — POTASSIUM CHLORIDE 20 MEQ: 29.8 INJECTION, SOLUTION INTRAVENOUS at 20:18

## 2021-08-22 RX ADMIN — POLYETHYLENE GLYCOL 3350 17 G: 17 POWDER, FOR SOLUTION ORAL at 08:35

## 2021-08-22 RX ADMIN — MEROPENEM 1 G: 1 INJECTION, POWDER, FOR SOLUTION INTRAVENOUS at 08:58

## 2021-08-22 RX ADMIN — POTASSIUM CHLORIDE 10 MEQ: 7.46 INJECTION, SOLUTION INTRAVENOUS at 06:47

## 2021-08-22 RX ADMIN — PANTOPRAZOLE SODIUM 40 MG: 40 INJECTION, POWDER, FOR SOLUTION INTRAVENOUS at 08:33

## 2021-08-22 ASSESSMENT — ACTIVITIES OF DAILY LIVING (ADL)
ADLS_ACUITY_SCORE: 17

## 2021-08-22 ASSESSMENT — MIFFLIN-ST. JEOR: SCORE: 1240.88

## 2021-08-22 NOTE — PLAN OF CARE
Problem: Adult Inpatient Plan of Care  Goal: Plan of Care Review  Outcome: Improving    D: Pt remains in ICU following aborted liver transplant on 8/19 secondary to hemodynamic instability and VT in OR. Stable and improved today, off pressors and extubated this afternoon.     I/A:     Neuro: Mild confusion and forgetful, otherwise oriented x4. Generalized weakness though moving all extremities without focal deficits. Assist of one and pivoted to chair. Denies pain.   Cardiac: Sinus rhythm in 60s. Remains off pressors, MAP >65.   Respiratory: Extubated around 1240. Weaned to room air. SpO2 100%. Lungs clear, minimal clear secretions.   GI: Passed bedside swallow. Tolerating clear liquid diet. No BM today, bowel meds given  :Jurado in place. Improved UO.   Hematology: HGB stable 8.2.   ID: Afebrile. Continues on merrem      P: Continue to monitor. Encourage IS and activity out of bed. Possible transfer out of ICU tomorrow. Family at bedside updated on plan of care.

## 2021-08-22 NOTE — PROGRESS NOTES
[facility], Procedure Note          Extubation:       Berta Nava  MRN# [id number]   August 22, 2021, 6:45 PM         Patient extubated at: August 22, 2021, 12:20PM   Supplemental Oxygen: Via nasal cannula at 4 liters per minute   Cough: The cough is strong   Secretion Mode: Able to clear   Secretion Amount: Small amount, thin and clear in color   Respiratory Exam:: Breath sounds: equal and clear     Location: all lobes   Skin Exam:: Patient color: natural   Patient Status: Currently appears comfortable   Arterial Blood Gasses: pH Arterial (no units)   Date Value   08/22/2021 7.38     pO2 Arterial (mm Hg)   Date Value   08/22/2021 106 (H)     pCO2 Arterial (mm Hg)   Date Value   08/22/2021 34 (L)     Bicarbonate Arterial (mmol/L)   Date Value   08/22/2021 20 (L)            Recorded by Edwin Rudolph

## 2021-08-22 NOTE — PROGRESS NOTES
MEDICAL ICU PROGRESS NOTE  08/22/2021        Date of Service (when I saw the patient): 08/22/2021    ASSESSMENT: Berta Nava is a 55 year old female with 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 She presented in stable condition from home for potential liver transplant. Patient was taken to the OR. Intraop, abdomen was opened, ascites was evacuated and falciform and left triangular ligament divided. At this time, patient developed vtach requiring 5-6 rounds of debrillation and the procedure was aborted. Consideration for starting ECMO, but patient converted out. Patient incisions closed. Taken to CT for concern for PE. CT showed no signs of PE. Comes to SICU 8/19 due to unstable hemodynamics on 3 pressors. Transferred to MICU 8/22 after coming of pressors.      CHANGES and MAJOR THINGS TODAY:   - Extubated  - Continue MAP goal > 55  - Reconsulted EP for risk stratification  - continue amiodarone to po taper  - Lovenox 40 mg    PLAN:  Neuro/Pain/Sedation:  #Post operative sedation and pain control   RASS at -1 on exam this AM.follows commands  -Sedation: Discontinue prop and fentanyl    Pulmonary:  #Post operative respiratory failure, resolved   -Extubated    Cardiovascular:  #Intraoperative V tach requiring 4 rounds of defibrillation   #Distributive vs hypovolemic shock   Patient had wnl stress ECHO pre-op with EF 60-65%. Intraoperative v tach requiring defibrillation, came to SICU on 3 pressors Stat TTE post op 8/19: Global and regional left ventricular function is normal with an EF of 60-65%. Off pressors since 8/20  - MAP goal > 55  - Amiodarone po taper  - Cards consulted, appreciate recs   - Hepatology requesting risk stratification for repeat tx attempt   - Will discuss likely cath early this week    GI/Nutrition:   # Hepatic Cirrhosis Secondary to PSC/UC  - NG confirmed  - Incision minimal oozing on dressings   - ROMA  drain x1, consider removing if tx surgery agrees  - Will trend LFTs   - Hepatology consulted, appreciate recs     Renal/Fluids/Electrolytes:  #Oliguria, improving   # GONZALO (BL Creat 0.7)  Patient with labile blood pressures requiring pressors. Likely prerenal injury. UOP improving   -Continue monitoring Cr and UOP     Endocrine:  Stress hyperglycemia  - sliding scale insulin      ID/Antibiotics:  Sepsis  Unclear source. Though initially to possibly be cholangitis but less likely after discussing with transplant hepatology. WBC 20 (31.3)  -Continue vancomycin   -OR fluid cultures NGTD  -Blood cultures NGTD  -Sputum cultures NGTD   -EBV IgG positive but IgM not reactive      Heme:   #Acute blood loss anemia    S/p transfusion: 3 units intraoperative   -Goals: Hb > 8, Fibrinogen >200, INR < 2, plts > 50  -Continue to trend Hb      Prophylaxis:  -DVT: mechanical SCD, hold heparin perioperatively  -PPI      MSK:  -PT and OT to be consulted once patient is less sedated      Lines/ tubes/ drains:  ETT, Peripheral IV x3, arterial line, RIJ MAC x 2, NG, ferguson, JPx1     Disposition:  -MICU, likely to floor in AM      Patient seen, findings and plan discussed with surgical ICU staff, Dr. Thomas Humphreys MD  Internal Medicine-Pediatrics PGY-3   [facility] [place]    ====================================  INTERVAL HISTORY:   NAEON. NNR. VSS. No complaints or in pain this AM. Discussed POC w/ family at bedside this AM. Extubated successfully since pt doing well on PST.     OBJECTIVE:   1. VITAL SIGNS:   Temp:  [96.8  F (36  C)-97.9  F (36.6  C)] 97  F (36.1  C)  Pulse:  [55-65] 63  Resp:  [10-18] 10  MAP:  [58 mmHg-85 mmHg] 65 mmHg  Arterial Line BP: ()/(40-64) 99/48  FiO2 (%):  [30 %] 30 %  SpO2:  [97 %-100 %] 99 %  Ventilation Mode: CMV/AC  (Continuous Mandatory Ventilation/ Assist Control)  FiO2 (%): 30 %  Rate Set (breaths/minute): 12 breaths/min  Tidal Volume Set (mL): 400 mL  PEEP (cm H2O): 5  cmH2O  Pressure Support (cm H2O): 7 cmH2O  Oxygen Concentration (%): 30 %  Resp: 10    2. INTAKE/ OUTPUT:   I/O last 3 completed shifts:  In: 1335.92 [I.V.:845.92; NG/GT:240]  Out: 1143 [Urine:563; Emesis/NG output:250; Drains:330]    3. PHYSICAL EXAMINATION:  General: ill-appearing woman, following commands  HEENT:ETT secured in place,   Neuro: sedated, NAD, following commands   Pulm/Resp: scattered rhonchi, vented, thin secretions   CV: RRR, S1. S2 present, no murmur appreciated   Abdomen: Soft, mildly distended, non-tender, no rebound tenderness or guarding, no masses ROMA drain w/ serosanguinous fluids.   :  ferguson catheter in place, urine yellow and clear  Incisions/Skin: no jaundice,   MSK/Extremities: trace peripheral edema, moving all extremities, peripheral pulses intact, calves soft, extremities well perfused    4. INVESTIGATIONS:   Arterial Blood Gases   Recent Labs   Lab 08/22/21  1055 08/20/21  0851 08/20/21  0421 08/19/21  2347   PH 7.38 7.33* 7.31* 7.32*   PCO2 34* 35 30* 29*   PO2 106* 115* 101 90   HCO3 20* 18* 15* 15*     Complete Blood Count   Recent Labs   Lab 08/22/21  0430 08/21/21  0417 08/20/21  2357 08/20/21  1953 08/20/21  0419 08/19/21  2347   WBC 17.7* 20.0*  --   --  31.3* 34.2*   HGB 8.3* 8.4*  8.4* 8.5* 8.5* 10.3*  10.3* 10.2*  10.2*    269  --   --  405 405     Basic Metabolic Panel  Recent Labs   Lab 08/22/21  0849 08/22/21  0442 08/22/21  0430 08/22/21  0056 08/21/21  0417 08/20/21  0849 08/20/21  0418 08/20/21  0418   NA  --   --  136  --  135 135  --  133   POTASSIUM  --   --  3.7  --  4.0  4.0 4.0  --  4.0   CHLORIDE  --   --  107  --  107 106  --  104   CO2  --   --  18*  --  19* 18*  --  14*   BUN  --   --  58*  --  39* 25  --  24   CR  --   --  1.23*  --  1.14* 0.82  --  0.78   * 115* 122* 122* 150* 176*   < > 199*    < > = values in this interval not displayed.     Liver Function Tests  Recent Labs   Lab 08/22/21  0430 08/21/21 2027 08/21/21 0417  08/20/21  2357 08/19/21  1839 08/19/21  1424 08/18/21 2132   AST 43 44 43 49*  --  51* 66*   ALT 27 25 26 26  --  27 36   ALKPHOS 200* 186* 150 151*  --  276* 400*   BILITOTAL 2.9* 3.1* 3.5* 4.0*  --  3.8* 3.6*   ALBUMIN 4.0 4.1 4.3 4.5  --  3.0* 2.0*   INR 1.63*  --   --   --  1.51* 1.61* 1.45*     Pancreatic Enzymes  Recent Labs   Lab 08/18/21 2132   AMYLASE 68     Coagulation Profile  Recent Labs   Lab 08/22/21  0430 08/19/21  1839 08/19/21  1424 08/18/21 2132   INR 1.63* 1.51* 1.61* 1.45*   PTT  --   --   --  30     5. RADIOLOGY:   No results found for this or any previous visit (from the past 24 hour(s)).    =========================================

## 2021-08-22 NOTE — PLAN OF CARE
Care Provided: 8/21/2021 from 1900 to 8/22/21 at 0700    Temp: 96.8  F (36  C) Temp src: Bladder   Pulse: 55   Resp: 16 SpO2: 98 % O2 Device: Mechanical Ventilator      Neuro: Patient awakens to voice and follows commands. When propofol was turned down to 10 she woke up and was very anxious. However, when recorder said, it's only 0500 patient expressed thanks and nodded that this is what she was wondering. She then began to rest.   Cardiac: Sinus Ramirez  Respiratory: Sating above 97% on mechanical ventilation via ET tube. FiO2 at 30%.   GI: BMx 0. NPO  : Minimal dark urine output overnight. See EMR.   Activity: BR  Pain: At acceptable level on current regimen. She nodes she is comfortable when asked. Fentanyl and propofol running at this time.   Skin: Incision to LUQ, ROMA drain from this site.   Lab:  Potassium being replaced, lab recheck for potassium, magnesium, and phosphorus ordered for tomorrow.   LDAs:    - Triple lumen internal jugular to the right  - PIV x 3  - Jurado catheter  - ROMA drain to LUQ incision  - Art line to left femoral      Shift Events: Patient is doing well. She is off of her levophed drip at this time, MAPs <60. She acknowledges that she would like to have ET tube removed today if possible. She has been resting overnight. No major events.   Plan: Will continue to monitor pt closely and notify primary team with any changes.

## 2021-08-22 NOTE — CONSULTS
Date of Service: 8/18/2021     Referring Provider: Oskar    Subjective:            Berta Nava is a 55 year old female presenting for evaluation of liver disease    History of Present Illness   Berta Nava is a 55 year old female with past medical history of hypertension, microscopic colitis, ulcerative colitis, pancreatic mucinous cystadenoma, primary sclerosing cholangitis who was initially presenting for liver transplantation, but unfortunately during the early procedure she went into an unstable tachy arrhythmia (either ventricular tachycardia or wide-complex atrial fibrillation) and the surgery was aborted.  She was in shock requiring for pressor support and transferred to the ICU for further cares.    She has been able to wean off all pressors.  She is interactive this morning and responding to questions and interaction.    She does have a history of cholangitis with bacteremia and septic shock in April, and has been followed closely by our pancreas and biliary team is undergone multiple ERCPs with biliary stenting.  They were consulted during this hospitalization, with ultimate plans to undergo ERCP today.    Past Medical History:  Past Medical History:   Diagnosis Date     Ascites      Biliary cirrhosis (H)      Cholangitis, sclerosing      Cirrhosis of liver with ascites (H) 3/3/2021     Hearing loss of left ear     wears a hearing aide     History of low potassium      Hyperlipidemia      Hypertension      SBP (spontaneous bacterial peritonitis) (H) 4/30/2021     Sjogren's syndrome (H)      Ulcerative colitis (H)      Ulcerative pancolitis (H)        Surgical History:  Past Surgical History:   Procedure Laterality Date     ENDOSCOPIC RETROGRADE CHOLANGIOPANCREATOGRAM N/A 6/25/2021    Procedure: ENDOSCOPIC RETROGRADE CHOLANGIOPANCREATOGRAPHY with ballon sweep of bile ducts for stones, balloon dilation of bile ducts and bile duct stent placement;  Surgeon: Gregory Gabriel MD;  Location:  OR      ENDOSCOPIC RETROGRADE CHOLANGIOPANCREATOGRAM N/A 2021    Procedure: ENDOSCOPIC RETROGRADE CHOLANGIOPANCREATOGRAPHY STONE REMOVAL, DILATION AND STENT PLACEMENT;  Surgeon: Gregory Gabriel MD;  Location:  OR     ENDOSCOPIC RETROGRADE CHOLANGIOPANCREATOGRAPHY, EXCHANGE TUBE/STENT N/A 2021    Procedure: ENDOSCOPIC RETROGRADE CHOLANGIOPANCREATOGRAPHY WITH STENT EXCHANGE, STONE EXTRACTION, AND DILATION;  Surgeon: Gregory Gabriel MD;  Location: [facility] OR     ENDOSCOPIC RETROGRADE CHOLANGIOPANCREATOGRAPHY, EXCHANGE TUBE/STENT N/A 5/10/2021    Procedure: ENDOSCOPIC RETROGRADE CHOLANGIOPANCREATOGRAPHY with biliary dilation, stone removal, stent exchange;  Surgeon: Gregory Gabriel MD;  Location: [facility] OR     ENDOSCOPIC RETROGRADE CHOLANGIOPANCREATOGRAPHY, EXCHANGE TUBE/STENT N/A 6/10/2021    Procedure: ENDOSCOPIC RETROGRADE CHOLANGIOPANCREATOGRAPHY, WITH biliary stent exchange, stone removal;  Surgeon: Woodrow Lott MD;  Location: [facility] OR     GYN SURGERY      bilat fallopian tubes and ovaries removed     PICC DOUBLE LUMEN PLACEMENT Right 2021    42cm (2cm external), Lateral brachial vein     TRANSPLANT LIVER RECIPIENT  DONOR N/A 2021    Procedure: Opening of abdomen, Abdominal Exploration and aborted liver transplant.;  Surgeon: Ernesto Schmitt MD;  Location: [facility] OR       Social History:  Social History     Tobacco Use     Smoking status: Never Smoker     Smokeless tobacco: Never Used   Substance Use Topics     Alcohol use: No     Comment: Last drink was      Drug use: No       Family History:  Family History   Problem Relation Age of Onset     Cancer Mother         angiosarcoma     Coronary Artery Disease Early Onset Father         MI age 46     Heart Transplant Father      Liver Disease No family hx of      Ulcerative Colitis No family hx of      Crohn's Disease No family hx of        Medications:  Current Facility-Administered Medications   Medication     albumin human 5 % injection  0-50 g     amiodarone (PACERONE) tablet 400 mg    Followed by     [START ON 8/31/2021] amiodarone (PACERONE) tablet 200 mg     dextrose 10% infusion     glucose gel 15-30 g    Or     dextrose 50 % injection 25-50 mL    Or     glucagon injection 1 mg     glucose gel 15-30 g    Or     dextrose 50 % injection 25-50 mL    Or     glucagon injection 1 mg     [Held by provider] enoxaparin ANTICOAGULANT (LOVENOX) injection 40 mg     fentaNYL (SUBLIMAZE) 50 mcg/mL bolus from infusion pump 25 mcg     fentaNYL (SUBLIMAZE) infusion     insulin aspart (NovoLOG) injection (RAPID ACTING)     meropenem (MERREM) 1 g vial to attach to  mL bag     multivitamins w/minerals (CERTAVITE) liquid 15 mL     naloxone (NARCAN) injection 0.2 mg    Or     naloxone (NARCAN) injection 0.4 mg    Or     naloxone (NARCAN) injection 0.2 mg    Or     naloxone (NARCAN) injection 0.4 mg     norepinephrine (LEVOPHED) 16 mg in  mL infusion MAX CONC CENTRAL LINE     pantoprazole (PROTONIX) IV push injection 40 mg     Patient is already receiving anticoagulation with heparin, enoxaparin (LOVENOX), warfarin (COUMADIN)  or other anticoagulant medication     polyethylene glycol (MIRALAX) Packet 17 g     propofol (DIPRIVAN) infusion     protein modular (PROSOURCE TF) 1 packet     sennosides (SENOKOT) tablet 8.6 mg     sodium chloride (PF) 0.9% PF flush 3 mL     sodium chloride (PF) 0.9% PF flush 3 mL     sodium chloride (PF) 0.9% PF flush 3 mL     vasopressin 0.2 units/mL in NS (PITRESSIN) standard conc infusion       Review of Systems  A complete 10 point review of systems was asked and answered in the negative unless specifically commented upon in the HPI    Objective:         Vitals:    08/22/21 0800 08/22/21 0830 08/22/21 0900 08/22/21 0930   BP:       BP Location:       Pulse: 56 56 59 56   Resp: 16      Temp: 96.8  F (36  C) 97  F (36.1  C) 97  F (36.1  C) 97  F (36.1  C)   TempSrc: Bladder      SpO2: 99% 98% 99% 99%   Weight:       Height:          Body mass index is 24.03 kg/m .     Physical Exam  Constitutional: Chronically ill  HEENT: Normocephalic.  No overt scleral icterus  Neck/Lymph: CVC  Cardiac:  Regular rate and rhythm.  Respiratory: Normal respiratory excursion   GI:  Abdomen soft, not distended.  Bilateral subcostal incision intact  Skin: No jaundice.   Peripheral Vascular: No lower extremity edema. 2+ pulses in all extremities  Musculoskeletal:  ROM intact, decreased muscle bulk    Neuro: No asterixis    Labs and Diagnostic tests:  Lab Results   Component Value Date     08/22/2021     06/25/2021    POTASSIUM 3.7 08/22/2021    POTASSIUM 4.4 06/25/2021    CHLORIDE 107 08/22/2021    CHLORIDE 100 06/25/2021    CO2 18 08/22/2021    CO2 21 06/25/2021    BUN 58 08/22/2021    BUN 23 06/25/2021    CR 1.23 08/22/2021    CR 0.69 06/25/2021     Lab Results   Component Value Date    BILITOTAL 2.9 08/22/2021    BILITOTAL 3.3 06/25/2021    ALT 27 08/22/2021    ALT 32 06/25/2021    AST 43 08/22/2021    AST 58 06/25/2021    ALKPHOS 200 08/22/2021    ALKPHOS 386 06/25/2021     Lab Results   Component Value Date    ALBUMIN 4.0 08/22/2021    ALBUMIN 2.8 06/25/2021    PROTTOTAL 6.2 08/22/2021    PROTTOTAL 7.4 06/25/2021      Lab Results   Component Value Date    WBC 17.7 08/22/2021    WBC 15.2 06/25/2021    HGB 8.3 08/22/2021    HGB 10.7 06/25/2021    MCV 89 08/22/2021    MCV 92 06/25/2021     08/22/2021     06/25/2021     Lab Results   Component Value Date    INR 1.63 08/22/2021    INR 1.39 06/25/2021     MELD-Na score: 18 at 8/22/2021  4:30 AM  MELD score: 17 at 8/22/2021  4:30 AM  Calculated from:  Serum Creatinine: 1.23 mg/dL at 8/22/2021  4:30 AM  Serum Sodium: 136 mmol/L at 8/22/2021  4:30 AM  Total Bilirubin: 2.9 mg/dL at 8/22/2021  4:30 AM  INR(ratio): 1.63 at 8/22/2021  4:30 AM  Age: 55 years     MICRO: reviewed    Imaging:  reviewed  Assessment and Plan:    PSC Cirrhosis:    -History of PSC cirrhosis and has an elevated M  ELD  -She was active on liver transplant wait list; with ABO-AB  -Status post aborted liver transplant on August 19 secondary to unstable tachyarrhythmia  -Does have in place biliary stents, but based on clinical picture I do not believe that she has cholangitis secondary to block stents that needs urgent intervention    -Discussed this with the advanced endoscopy staff and they were in agreement based on the clinical picture, lack of positive blood cultures, and resolution of shock that this is not an emergent procedure, and that the patient would benefit from further cardiac risk stratification prior to any intervention in the operating room    V Tach/Wide Complex A fib:  -Was hemodynamically unstable requiring multiple pressors; now weaned off pressor support  -Was seen by EP cardiology last week  -Request primary service reach out to EP service for guidance on further risk stratification that will be necessary and essential prior to the patient being considered for liver transplantation    Liver Transplant Candidacy:   -She is currently inactive on the liver transplant wait list until we can further delineate her tachyarrhythmia    Ascites:  -In the setting of her mild acute kidney injury I do think it appropriate to currently hold her diuretics  -If she develops significant ascites would recommend a diagnostic and therapeutic paracentesis    Hepatic Encephalopathy:  -She did not have significant issues with encephalopathy as an outpatient  -Based on her recent operation and exposure to narcotic pain medicines and sedation for intubation she is at risk  -Continue with neurochecks per floor routine  -No overt indication for lactulose or rifaximin at this time    Nutrition:  -Patient should be started on a low-sodium diet (or tube feeds) as soon as possible.  She should be receiving protein snacks at least 3 times daily including nightly    Thomas M. Leventhal, M.D.   of Medicine  Advanced &  Transplant Hepatology  [facility]

## 2021-08-22 NOTE — PROGRESS NOTES
MICU Progress Note 8-    MICU Staff      This patient has been seen and evaluated by me.  I have discussed care with the housestaff and agree with the findings and plan in their note. 55 year old female with 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 She presented in stable condition from home for potential liver transplant. Patient was taken to the OR. Intraop, abdomen was opened, ascites was evacuated and falciform and left triangular ligament divided.  Patient developed vtach requiring 5-6 rounds of debrillation and the procedure was aborted.  Patient incisions closed. Taken to CT for concern for PE. CT showed no signs of PE. Comes to SICU 8/19 due to unstable hemodynamics on 3 pressors. Currently patient now off pressors. Remains intubated but is awake and following commands. On PST and taking >1 liter breaths.  Plan:  1. Anticipate extubation later today - just came off sedation and is still sleepy.   2. Once extubated swallow study.  3. V-tach requiring defibrillation. EP consult for  Risk assessment. GI would still like for patient to be considered for liver transplant but given arrhythmia needs EP evaluation. Continue amiodarone for now.  4. GONZALO. Monitor electrolytes, UOP, and BUN/Cr.  5. Has ROMA drain in place. Need to discuss with surgery when to remove.       Jamie Guzman MD  Pulmonary/Critical Care  August 22, 2021 11:03 AM    CCT 30 minutes separate from procedures

## 2021-08-22 NOTE — PROGRESS NOTES
CLINICAL NUTRITION SERVICES - BRIEF NOTE    Nutrition Prescription    RECOMMENDATIONS FOR MDs/PROVIDERS TO ORDER:  - Total daily fluids/adjustments per MD  - Electrolyte replacement per protocol as indicated.    Recommendations already ordered by Registered Dietitian (RD):  - Initiate via current access/NGT: Osmolite 1.5 Chris @ goal of  55ml/hr  (1320ml/day)  will provide: 1980 kcals (33 kcal/kg), 83 g PRO (1.4 g/kg), 1005 ml free H20, 268 g CHO, and 0 g fiber daily. + 2 pkts ProSource per day. Total provisions: 2060 kcal (34 kcal/kg) and 105 g PRO (1.8 g/kg).  - Initiate @ 15 ml/hr and advance by 10 ml q8hr pending pt's tolerance  - Do not start or advance TF rate unless Mg++ >1.5, K+ is >3, and phos >1.9  - Recommend 30 ml q4hr fluid flushes for tube patency. Additional fluids and/or adjustments per MD.    - Order multivitamin/mineral (15 ml/day via FT) to help ensure micronutrient needs being met with suspected hypermetabolic demands and potential interruptions to TF infusions.    Future/Additional Recommendations:  - EN initiation and tolerance via current access/NGT      Reviewed imaging/enteral access, labs, and meds. Lytes: Mg++ 2.6 (H); phos 5.4 (H).    Per chart note 8/21: Transplant aborted due to episode of VT requiring cardioversion and hypotension requiring pressors. Patient transferred to SICU for hemodynamic monitoring    Nutrition Progress Note - f/u for progress towards previous nutrition POC (see previous reassessment for details)     James Lerma, BERTO, LD, [facility]  Neuro ICU  Pager: [phone]    Unit pager: [phone]

## 2021-08-22 NOTE — PROGRESS NOTES
Transplant Surgery  Inpatient Daily Progress Note  08/22/2021    Assessment & Plan: 55 year old female with 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 Transplant aborted due to episode of VT requiring cardioversion and hypotension requiring pressors. Patient transferred to SICU for hemodynamic monitoring. Cardiology consulting. ID consulted. Panc/biliary team consulted.     Cardiorespiratory: Management per SICU  Hypotension secondary to shock: Improved.  Off pressors. MAP goal > 65.  Ventricular tachycardia: EP Cards consulted. On amiodarone, switched to oral. K > 4 and Mg > 2. Telemetry. Per cardiology, likely not primarily a cardiac issue, but rather sepsis, electrolyte abnormalities. Troponin trending down .  Post operative respiratory failure: Now extubated  Hematology: 8/19 INR 1.6, PLT wnl  Anemia: Hgb stable  GI/Nutrition: NPO. PPI. Swallow study  PSC/UC: Ascites, surgery drain to gravity, output ~1.1L yesterday and ~80 since midnight. PTA ursodial not started.  Fluid/Electrolytes: Mg 2.6  GONZALO: Cr 1.2. UO 15-40 ml/hr. K 3.7. Keep I:O. Avoid nephrotoxins.   Endocrine: Stress/steroid hyperglycemia. sliding scale insulin every 4 hours  Hydrocortisone stopped 8/21  Neuro:  Sedation, pain control: Propofol, fentanyl  : Jurado. Strict I & O  Infectious disease: Afebrile, WBC 34.2->31.3->20.   Septic shock: Consulted ID. Fluid gram stain negative. Fluid cx and  blood cx in process, no growth. On Meropenem. Vanco and micafungin stopped.   7/22 ERCP with stent placement. GI consulted due to concern for sepsis, evaluate for cholangitis --would hold on procedure due to hypotension.   Prophylaxis: DVT (heparin SQ), GI (PPI)  Activity: PT/OT  Disposition: SICU    Medical Decision Making: High  Subsequent visit [id number] (high level decision making)    [place]/Fellow/Resident Provider: Kelly Salas PA-C    Faculty: Ernesto Schmitt  "M.D.    __________________________________________________________________  Transplant History: Admitted 8/18/2021 for liver transplant.  N/A, Postoperative day:      Interval History:  Off pressors, extubated just now.     ROS:   A 10-point review of systems was negative except as noted above.    Meds:    amiodarone  400 mg Oral BID    Followed by     [START ON 8/31/2021] amiodarone  200 mg Oral Daily     enoxaparin ANTICOAGULANT  40 mg Subcutaneous Q24H     insulin aspart  1-6 Units Subcutaneous Q4H     meropenem  1 g Intravenous Q8H     pantoprazole (PROTONIX) IV  40 mg Intravenous Daily with breakfast     polyethylene glycol  17 g Oral Daily     sennosides  8.6 mg Oral BID     sodium chloride (PF)  3 mL Intravenous Q8H       Physical Exam:     Admit Weight: 59.5 kg (131 lb 2.8 oz)    Current vitals:   /66 (BP Location: Left arm)   Pulse 65   Temp 97  F (36.1  C)   Resp 15   Ht 1.651 m (5' 5\")   Wt 66.2 kg (145 lb 15.1 oz)   SpO2 100%   BMI 24.29 kg/m        Vital sign ranges:    Temp:  [96.8  F (36  C)-98.6  F (37  C)] 97  F (36.1  C)  Pulse:  [56-65] 65  Resp:  [10-18] 15  MAP:  [58 mmHg-85 mmHg] 72 mmHg  Arterial Line BP: ()/(40-64) 106/54  FiO2 (%):  [30 %-45 %] 30 %  SpO2:  [97 %-100 %] 100 %  Patient Vitals for the past 24 hrs:   Temp Temp src Pulse Resp SpO2   08/22/21 0500 -- -- 65 -- 100 %   08/22/21 0456 -- -- -- -- 99 %   08/22/21 0445 97  F (36.1  C) -- 63 -- 99 %   08/22/21 0430 96.8  F (36  C) -- 57 -- 99 %   08/22/21 0415 96.8  F (36  C) Bladder 58 15 99 %   08/22/21 0400 97  F (36.1  C) Bladder 58 15 99 %   08/22/21 0345 97  F (36.1  C) -- 58 -- 98 %   08/22/21 0330 -- -- 58 -- 98 %   08/22/21 0322 -- -- 58 -- 98 %   08/22/21 0315 97.2  F (36.2  C) -- 59 -- 98 %   08/22/21 0300 -- -- 58 -- 98 %   08/22/21 0245 -- -- 59 -- 98 %   08/22/21 0230 -- -- 63 -- 98 %   08/22/21 0215 -- -- 60 -- 98 %   08/22/21 0200 -- -- 59 -- 98 %   08/22/21 0145 97.5  F (36.4  C) -- 59 -- 98 % "   08/22/21 0130 97.5  F (36.4  C) -- 59 -- 98 %   08/22/21 0115 97.7  F (36.5  C) -- 59 -- 98 %   08/22/21 0100 97.7  F (36.5  C) -- 59 -- 98 %   08/22/21 0045 97.5  F (36.4  C) -- 59 -- 98 %   08/22/21 0030 97.5  F (36.4  C) -- 59 -- 98 %   08/22/21 0015 97.5  F (36.4  C) -- 58 -- 98 %   08/22/21 0000 97.5  F (36.4  C) Bladder 59 14 98 %   08/21/21 2345 97.5  F (36.4  C) -- 59 -- 98 %   08/21/21 2330 97.5  F (36.4  C) -- 58 -- 98 %   08/21/21 2315 97.5  F (36.4  C) -- 58 -- 98 %   08/21/21 2300 97.5  F (36.4  C) -- 58 -- 98 %   08/21/21 2245 97.5  F (36.4  C) -- 59 -- 98 %   08/21/21 2230 97.5  F (36.4  C) -- 61 -- 98 %   08/21/21 2220 -- -- 59 -- 98 %   08/21/21 2215 97.5  F (36.4  C) -- 59 -- 98 %   08/21/21 2200 97.5  F (36.4  C) -- 58 -- 98 %   08/21/21 2145 97.3  F (36.3  C) -- 60 -- 98 %   08/21/21 2130 97.3  F (36.3  C) -- 59 -- 98 %   08/21/21 2115 97.3  F (36.3  C) -- 57 -- 98 %   08/21/21 2100 97.3  F (36.3  C) -- 59 -- 98 %   08/21/21 2045 97.3  F (36.3  C) -- 57 -- 99 %   08/21/21 2030 97.3  F (36.3  C) -- 58 -- 98 %   08/21/21 2015 97.3  F (36.3  C) -- 58 -- 98 %   08/21/21 2000 97.3  F (36.3  C) Bladder 58 14 98 %   08/21/21 1815 97.3  F (36.3  C) -- 59 -- 98 %   08/21/21 1800 97.5  F (36.4  C) -- 59 -- 98 %   08/21/21 1745 97.5  F (36.4  C) -- 58 -- 98 %   08/21/21 1730 97.5  F (36.4  C) -- 58 -- 98 %   08/21/21 1715 97.7  F (36.5  C) -- 60 -- 97 %   08/21/21 1700 97.7  F (36.5  C) -- 60 16 97 %   08/21/21 1645 97.7  F (36.5  C) -- 62 -- 97 %   08/21/21 1630 97.7  F (36.5  C) -- 61 -- 98 %   08/21/21 1615 97.9  F (36.6  C) -- 60 -- 98 %   08/21/21 1600 97.9  F (36.6  C) Bladder 60 14 98 %   08/21/21 1545 97.9  F (36.6  C) -- 65 -- 97 %   08/21/21 1530 97.9  F (36.6  C) -- 61 -- 97 %   08/21/21 1515 97.9  F (36.6  C) -- 63 -- 97 %   08/21/21 1500 97.9  F (36.6  C) -- 61 18 97 %   08/21/21 1445 97.9  F (36.6  C) -- 58 -- 98 %   08/21/21 1430 97.9  F (36.6  C) -- 60 -- 98 %   08/21/21 1415 97.9  F (36.6   C) -- 60 -- 97 %   08/21/21 1400 97.9  F (36.6  C) -- 59 18 99 %   08/21/21 1345 97.9  F (36.6  C) -- 59 -- 98 %   08/21/21 1330 97.9  F (36.6  C) -- 60 -- 99 %   08/21/21 1315 97.9  F (36.6  C) -- 61 -- 98 %   08/21/21 1300 97.9  F (36.6  C) -- 61 10 99 %   08/21/21 1245 98.1  F (36.7  C) -- 60 -- 99 %   08/21/21 1230 98.1  F (36.7  C) -- 61 -- 98 %   08/21/21 1215 98.1  F (36.7  C) -- 65 -- 98 %   08/21/21 1200 98.1  F (36.7  C) Bladder 57 16 98 %   08/21/21 1145 98.1  F (36.7  C) -- 57 -- 99 %   08/21/21 1130 98.1  F (36.7  C) -- 56 -- 98 %   08/21/21 1115 98.1  F (36.7  C) -- 56 -- 98 %   08/21/21 1100 98.1  F (36.7  C) -- 56 -- 98 %   08/21/21 1000 98.2  F (36.8  C) -- 56 18 98 %   08/21/21 0945 98.2  F (36.8  C) -- 56 -- 97 %   08/21/21 0930 98.4  F (36.9  C) -- 57 -- 98 %   08/21/21 0915 98.4  F (36.9  C) -- 57 -- 97 %   08/21/21 0900 98.2  F (36.8  C) -- 58 16 97 %   08/21/21 0845 98.4  F (36.9  C) -- 57 -- 97 %   08/21/21 0830 98.6  F (37  C) -- 58 -- 98 %   08/21/21 0815 98.6  F (37  C) -- 58 -- 98 %   08/21/21 0800 98.6  F (37  C) Bladder 60 16 100 %   08/21/21 0745 98.6  F (37  C) -- 58 -- 98 %   08/21/21 0730 98.6  F (37  C) -- 59 -- 99 %   08/21/21 0715 98.6  F (37  C) -- -- -- --   08/21/21 0700 98.6  F (37  C) -- 57 -- 99 %   08/21/21 0615 98.6  F (37  C) -- 57 -- 98 %   08/21/21 0600 98.6  F (37  C) -- 57 -- 98 %   08/21/21 0545 98.6  F (37  C) -- 56 -- 98 %   08/21/21 0530 98.6  F (37  C) -- 57 -- 98 %   08/21/21 0515 98.6  F (37  C) -- 56 -- 98 %     General Appearance: NAD   Skin: no rash  Heart: NSR, PACs  Lungs: extubated just now  Abdomen: soft, chevron incision surgical dressing, shadowing. ROMA, serous output.  : ferguson is present.    Extremities: +edema  Neurologic: awake    Data:   CMP  Recent Labs   Lab 08/22/21  0442 08/22/21  0430 08/21/21  2027 08/21/21  0417 08/19/21  1424 08/19/21  1316 08/19/21  1222 08/19/21  1222 08/18/21  2132   NA  --  136  --  135  --  133   < > 130* 126*    POTASSIUM  --  3.7  --  4.0  4.0  --  3.5   < > 3.9 3.9   CHLORIDE  --  107  --  107  --   --    < >  --  98   CO2  --  18*  --  19*  --   --    < >  --  20   * 122*  --  150*   < > 128*   < > 122* 100*   BUN  --  58*  --  39*  --   --    < >  --  28   CR  --  1.23*  --  1.14*  --   --    < >  --  0.65   GFRESTIMATED  --  49*  --  54*  --   --    < >  --  >90   MARIELENA  --  9.2  --  9.0  --   --    < >  --  8.6   ICAW  --   --   --   --   --  4.6  --  4.7  --    MAG  --  2.6*  --  2.4*  --   --    < >  --  2.0   PHOS  --  5.4*  --  4.6*  --   --    < >  --  3.7   AMYLASE  --   --   --   --   --   --   --   --  68   ALBUMIN  --  4.0 4.1 4.3  --   --    < >  --  2.0*   BILITOTAL  --  2.9* 3.1* 3.5*  --   --    < >  --  3.6*   ALKPHOS  --  200* 186* 150  --   --    < >  --  400*   AST  --  43 44 43  --   --    < >  --  66*   ALT  --  27 25 26  --   --    < >  --  36    < > = values in this interval not displayed.     CBC  Recent Labs   Lab 08/22/21 0430 08/21/21  0417   HGB 8.3* 8.4*  8.4*   WBC 17.7* 20.0*    269     COAGS  Recent Labs   Lab 08/22/21  0430 08/19/21  1839 08/18/21  2132   INR 1.63* 1.51* 1.45*   PTT  --   --  30      Urinalysis  Recent Labs   Lab Test 08/19/21  2135 08/19/21  0513 05/21/21  0730   COLOR Yellow Dark Yellow* Dark Yellow   APPEARANCE Slightly Cloudy* Clear Clear   URINEGLC Negative Negative Negative   URINEBILI Small* Small* Negative   URINEKETONE Negative Negative Negative   SG 1.025 1.019 1.031   UBLD Trace* Negative Negative   URINEPH 5.0 5.5 6.0   PROTEIN 10 * Negative 20*   NITRITE Negative Negative Negative   LEUKEST Negative Negative Negative   RBCU 6* <1 1   WBCU 2 1 4   UTPG  --   --  0.26*

## 2021-08-23 ENCOUNTER — APPOINTMENT (OUTPATIENT)
Dept: PHYSICAL THERAPY | Facility: CLINIC | Age: 56
DRG: 420 | End: 2021-08-23
Attending: TRANSPLANT SURGERY
Payer: COMMERCIAL

## 2021-08-23 ENCOUNTER — APPOINTMENT (OUTPATIENT)
Dept: OCCUPATIONAL THERAPY | Facility: CLINIC | Age: 56
DRG: 420 | End: 2021-08-23
Attending: TRANSPLANT SURGERY
Payer: COMMERCIAL

## 2021-08-23 ENCOUNTER — APPOINTMENT (OUTPATIENT)
Dept: ULTRASOUND IMAGING | Facility: CLINIC | Age: 56
DRG: 420 | End: 2021-08-23
Attending: TRANSPLANT SURGERY
Payer: COMMERCIAL

## 2021-08-23 LAB
ALBUMIN FLD-MCNC: 1.1 G/DL
ALBUMIN SERPL-MCNC: 3 G/DL (ref 3.4–5)
ALBUMIN SERPL-MCNC: 3.4 G/DL (ref 3.4–5)
ALP SERPL-CCNC: 247 U/L (ref 40–150)
ALP SERPL-CCNC: 368 U/L (ref 40–150)
ALT SERPL W P-5'-P-CCNC: 31 U/L (ref 0–50)
ALT SERPL W P-5'-P-CCNC: 39 U/L (ref 0–50)
ANION GAP SERPL CALCULATED.3IONS-SCNC: 9 MMOL/L (ref 3–14)
ANION GAP SERPL CALCULATED.3IONS-SCNC: 9 MMOL/L (ref 3–14)
APPEARANCE FLD: ABNORMAL
AST SERPL W P-5'-P-CCNC: 42 U/L (ref 0–45)
AST SERPL W P-5'-P-CCNC: 59 U/L (ref 0–45)
ATRIAL RATE - MUSE: 67 BPM
BILIRUB DIRECT SERPL-MCNC: 2 MG/DL (ref 0–0.2)
BILIRUB DIRECT SERPL-MCNC: 2.8 MG/DL (ref 0–0.2)
BILIRUB SERPL-MCNC: 3.1 MG/DL (ref 0.2–1.3)
BILIRUB SERPL-MCNC: 4.3 MG/DL (ref 0.2–1.3)
BUN SERPL-MCNC: 47 MG/DL (ref 7–30)
BUN SERPL-MCNC: 50 MG/DL (ref 7–30)
CALCIUM SERPL-MCNC: 8.9 MG/DL (ref 8.5–10.1)
CALCIUM SERPL-MCNC: 9.4 MG/DL (ref 8.5–10.1)
CHLORIDE BLD-SCNC: 106 MMOL/L (ref 94–109)
CHLORIDE BLD-SCNC: 108 MMOL/L (ref 94–109)
CO2 SERPL-SCNC: 19 MMOL/L (ref 20–32)
CO2 SERPL-SCNC: 20 MMOL/L (ref 20–32)
COLOR FLD: ABNORMAL
CREAT SERPL-MCNC: 0.61 MG/DL (ref 0.52–1.04)
CREAT SERPL-MCNC: 0.69 MG/DL (ref 0.52–1.04)
DIASTOLIC BLOOD PRESSURE - MUSE: NORMAL MMHG
ERYTHROCYTE [DISTWIDTH] IN BLOOD BY AUTOMATED COUNT: 16 % (ref 10–15)
ERYTHROCYTE [DISTWIDTH] IN BLOOD BY AUTOMATED COUNT: 16 % (ref 10–15)
GFR SERPL CREATININE-BSD FRML MDRD: >90 ML/MIN/1.73M2
GFR SERPL CREATININE-BSD FRML MDRD: >90 ML/MIN/1.73M2
GLUCOSE BLD-MCNC: 113 MG/DL (ref 70–99)
GLUCOSE BLD-MCNC: 83 MG/DL (ref 70–99)
GLUCOSE BLDC GLUCOMTR-MCNC: 110 MG/DL (ref 70–99)
GLUCOSE BLDC GLUCOMTR-MCNC: 77 MG/DL (ref 70–99)
GLUCOSE BLDC GLUCOMTR-MCNC: 84 MG/DL (ref 70–99)
GLUCOSE BLDC GLUCOMTR-MCNC: 84 MG/DL (ref 70–99)
GLUCOSE BLDC GLUCOMTR-MCNC: 86 MG/DL (ref 70–99)
GLUCOSE BLDC GLUCOMTR-MCNC: 89 MG/DL (ref 70–99)
GLUCOSE BLDC GLUCOMTR-MCNC: 95 MG/DL (ref 70–99)
GRAM STAIN RESULT: NORMAL
GRAM STAIN RESULT: NORMAL
HBV DNA SERPL QL NAA+PROBE: NORMAL
HCT VFR BLD AUTO: 27.1 % (ref 35–47)
HCT VFR BLD AUTO: 31.8 % (ref 35–47)
HCV RNA SERPL QL NAA+PROBE: NORMAL
HGB BLD-MCNC: 10.5 G/DL (ref 11.7–15.7)
HGB BLD-MCNC: 9.1 G/DL (ref 11.7–15.7)
HIV1+2 RNA SERPL QL NAA+PROBE: NORMAL
INR PPP: 1.5 (ref 0.85–1.15)
INR PPP: 1.69 (ref 0.85–1.15)
INTERPRETATION ECG - MUSE: NORMAL
LDH FLD L TO P-CCNC: 122 U/L
LYMPHOCYTES NFR FLD MANUAL: 9 %
MAGNESIUM SERPL-MCNC: 2.3 MG/DL (ref 1.6–2.3)
MCH RBC QN AUTO: 29.7 PG (ref 26.5–33)
MCH RBC QN AUTO: 29.9 PG (ref 26.5–33)
MCHC RBC AUTO-ENTMCNC: 33 G/DL (ref 31.5–36.5)
MCHC RBC AUTO-ENTMCNC: 33.6 G/DL (ref 31.5–36.5)
MCV RBC AUTO: 89 FL (ref 78–100)
MCV RBC AUTO: 91 FL (ref 78–100)
MONOS+MACROS NFR FLD MANUAL: 53 %
NEUTS BAND NFR FLD MANUAL: 38 %
P AXIS - MUSE: 37 DEGREES
PHOSPHATE SERPL-MCNC: 3 MG/DL (ref 2.5–4.5)
PLATELET # BLD AUTO: 163 10E3/UL (ref 150–450)
PLATELET # BLD AUTO: 201 10E3/UL (ref 150–450)
POTASSIUM BLD-SCNC: 3.4 MMOL/L (ref 3.4–5.3)
POTASSIUM BLD-SCNC: 3.4 MMOL/L (ref 3.4–5.3)
POTASSIUM BLD-SCNC: 3.5 MMOL/L (ref 3.4–5.3)
PR INTERVAL - MUSE: 150 MS
PROT FLD-MCNC: 2 G/DL
PROT SERPL-MCNC: 5.4 G/DL (ref 6.8–8.8)
PROT SERPL-MCNC: 6 G/DL (ref 6.8–8.8)
QRS DURATION - MUSE: 86 MS
QT - MUSE: 490 MS
QTC - MUSE: 517 MS
R AXIS - MUSE: 64 DEGREES
RBC # BLD AUTO: 3.06 10E6/UL (ref 3.8–5.2)
RBC # BLD AUTO: 3.51 10E6/UL (ref 3.8–5.2)
SODIUM SERPL-SCNC: 135 MMOL/L (ref 133–144)
SODIUM SERPL-SCNC: 136 MMOL/L (ref 133–144)
SYSTOLIC BLOOD PRESSURE - MUSE: NORMAL MMHG
T AXIS - MUSE: 42 DEGREES
VENTRICULAR RATE- MUSE: 67 BPM
WBC # BLD AUTO: 11.8 10E3/UL (ref 4–11)
WBC # BLD AUTO: 14.3 10E3/UL (ref 4–11)
WBC # FLD AUTO: 355 /UL

## 2021-08-23 PROCEDURE — 85610 PROTHROMBIN TIME: CPT | Performed by: NURSE PRACTITIONER

## 2021-08-23 PROCEDURE — 93979 VASCULAR STUDY: CPT

## 2021-08-23 PROCEDURE — 82248 BILIRUBIN DIRECT: CPT | Performed by: STUDENT IN AN ORGANIZED HEALTH CARE EDUCATION/TRAINING PROGRAM

## 2021-08-23 PROCEDURE — 250N000013 HC RX MED GY IP 250 OP 250 PS 637: Performed by: TRANSPLANT SURGERY

## 2021-08-23 PROCEDURE — 99024 POSTOP FOLLOW-UP VISIT: CPT | Performed by: TRANSPLANT SURGERY

## 2021-08-23 PROCEDURE — 82042 OTHER SOURCE ALBUMIN QUAN EA: CPT | Performed by: STUDENT IN AN ORGANIZED HEALTH CARE EDUCATION/TRAINING PROGRAM

## 2021-08-23 PROCEDURE — 80053 COMPREHEN METABOLIC PANEL: CPT | Performed by: TRANSPLANT SURGERY

## 2021-08-23 PROCEDURE — 93979 VASCULAR STUDY: CPT | Mod: 26 | Performed by: RADIOLOGY

## 2021-08-23 PROCEDURE — 97535 SELF CARE MNGMENT TRAINING: CPT | Mod: GO

## 2021-08-23 PROCEDURE — 99233 SBSQ HOSP IP/OBS HIGH 50: CPT | Mod: GC | Performed by: INTERNAL MEDICINE

## 2021-08-23 PROCEDURE — 250N000013 HC RX MED GY IP 250 OP 250 PS 637: Performed by: STUDENT IN AN ORGANIZED HEALTH CARE EDUCATION/TRAINING PROGRAM

## 2021-08-23 PROCEDURE — 87070 CULTURE OTHR SPECIMN AEROBIC: CPT | Performed by: STUDENT IN AN ORGANIZED HEALTH CARE EDUCATION/TRAINING PROGRAM

## 2021-08-23 PROCEDURE — 99232 SBSQ HOSP IP/OBS MODERATE 35: CPT | Mod: GC | Performed by: INTERNAL MEDICINE

## 2021-08-23 PROCEDURE — 97165 OT EVAL LOW COMPLEX 30 MIN: CPT | Mod: GO

## 2021-08-23 PROCEDURE — 250N000011 HC RX IP 250 OP 636: Performed by: STUDENT IN AN ORGANIZED HEALTH CARE EDUCATION/TRAINING PROGRAM

## 2021-08-23 PROCEDURE — 250N000011 HC RX IP 250 OP 636: Performed by: INTERNAL MEDICINE

## 2021-08-23 PROCEDURE — 99233 SBSQ HOSP IP/OBS HIGH 50: CPT | Performed by: STUDENT IN AN ORGANIZED HEALTH CARE EDUCATION/TRAINING PROGRAM

## 2021-08-23 PROCEDURE — C9113 INJ PANTOPRAZOLE SODIUM, VIA: HCPCS | Performed by: NURSE PRACTITIONER

## 2021-08-23 PROCEDURE — 250N000011 HC RX IP 250 OP 636

## 2021-08-23 PROCEDURE — 84450 TRANSFERASE (AST) (SGOT): CPT | Performed by: STUDENT IN AN ORGANIZED HEALTH CARE EDUCATION/TRAINING PROGRAM

## 2021-08-23 PROCEDURE — 84100 ASSAY OF PHOSPHORUS: CPT | Performed by: NURSE PRACTITIONER

## 2021-08-23 PROCEDURE — 89050 BODY FLUID CELL COUNT: CPT | Performed by: STUDENT IN AN ORGANIZED HEALTH CARE EDUCATION/TRAINING PROGRAM

## 2021-08-23 PROCEDURE — 250N000011 HC RX IP 250 OP 636: Performed by: NURSE PRACTITIONER

## 2021-08-23 PROCEDURE — 89051 BODY FLUID CELL COUNT: CPT | Performed by: STUDENT IN AN ORGANIZED HEALTH CARE EDUCATION/TRAINING PROGRAM

## 2021-08-23 PROCEDURE — 84157 ASSAY OF PROTEIN OTHER: CPT | Performed by: STUDENT IN AN ORGANIZED HEALTH CARE EDUCATION/TRAINING PROGRAM

## 2021-08-23 PROCEDURE — 97116 GAIT TRAINING THERAPY: CPT | Mod: GP

## 2021-08-23 PROCEDURE — 83735 ASSAY OF MAGNESIUM: CPT | Performed by: NURSE PRACTITIONER

## 2021-08-23 PROCEDURE — P9041 ALBUMIN (HUMAN),5%, 50ML: HCPCS

## 2021-08-23 PROCEDURE — 120N000002 HC R&B MED SURG/OB UMMC

## 2021-08-23 PROCEDURE — 97530 THERAPEUTIC ACTIVITIES: CPT | Mod: GP

## 2021-08-23 PROCEDURE — 84132 ASSAY OF SERUM POTASSIUM: CPT | Performed by: TRANSPLANT SURGERY

## 2021-08-23 PROCEDURE — 87205 SMEAR GRAM STAIN: CPT | Performed by: STUDENT IN AN ORGANIZED HEALTH CARE EDUCATION/TRAINING PROGRAM

## 2021-08-23 PROCEDURE — 97162 PT EVAL MOD COMPLEX 30 MIN: CPT | Mod: GP

## 2021-08-23 PROCEDURE — 83615 LACTATE (LD) (LDH) ENZYME: CPT | Performed by: STUDENT IN AN ORGANIZED HEALTH CARE EDUCATION/TRAINING PROGRAM

## 2021-08-23 PROCEDURE — 250N000013 HC RX MED GY IP 250 OP 250 PS 637: Performed by: NURSE PRACTITIONER

## 2021-08-23 PROCEDURE — 84132 ASSAY OF SERUM POTASSIUM: CPT | Performed by: NURSE PRACTITIONER

## 2021-08-23 PROCEDURE — 80048 BASIC METABOLIC PNL TOTAL CA: CPT | Performed by: NURSE PRACTITIONER

## 2021-08-23 PROCEDURE — 85027 COMPLETE CBC AUTOMATED: CPT | Performed by: NURSE PRACTITIONER

## 2021-08-23 PROCEDURE — 84155 ASSAY OF PROTEIN SERUM: CPT | Performed by: STUDENT IN AN ORGANIZED HEALTH CARE EDUCATION/TRAINING PROGRAM

## 2021-08-23 RX ORDER — POLYETHYLENE GLYCOL 3350 17 G/17G
17 POWDER, FOR SOLUTION ORAL DAILY PRN
Status: DISCONTINUED | OUTPATIENT
Start: 2021-08-23 | End: 2021-09-09 | Stop reason: HOSPADM

## 2021-08-23 RX ORDER — OXYCODONE HYDROCHLORIDE 5 MG/1
5 TABLET ORAL EVERY 4 HOURS PRN
Status: DISCONTINUED | OUTPATIENT
Start: 2021-08-23 | End: 2021-08-25

## 2021-08-23 RX ORDER — POTASSIUM CHLORIDE 750 MG/1
20 TABLET, EXTENDED RELEASE ORAL ONCE
Status: COMPLETED | OUTPATIENT
Start: 2021-08-23 | End: 2021-08-23

## 2021-08-23 RX ORDER — SENNOSIDES 8.6 MG
8.6 TABLET ORAL 2 TIMES DAILY PRN
Status: DISCONTINUED | OUTPATIENT
Start: 2021-08-23 | End: 2021-09-09 | Stop reason: HOSPADM

## 2021-08-23 RX ORDER — POTASSIUM CHLORIDE 29.8 MG/ML
20 INJECTION INTRAVENOUS
Status: COMPLETED | OUTPATIENT
Start: 2021-08-23 | End: 2021-08-23

## 2021-08-23 RX ORDER — SPIRONOLACTONE 25 MG/1
100 TABLET ORAL DAILY
Status: DISCONTINUED | OUTPATIENT
Start: 2021-08-24 | End: 2021-09-06

## 2021-08-23 RX ORDER — LACTULOSE 10 G/10G
20 SOLUTION ORAL 2 TIMES DAILY
Status: DISCONTINUED | OUTPATIENT
Start: 2021-08-23 | End: 2021-09-09 | Stop reason: HOSPADM

## 2021-08-23 RX ORDER — SIMVASTATIN 20 MG
20 TABLET ORAL AT BEDTIME
Status: DISCONTINUED | OUTPATIENT
Start: 2021-08-23 | End: 2021-08-23

## 2021-08-23 RX ORDER — PIPERACILLIN SODIUM, TAZOBACTAM SODIUM 3; .375 G/15ML; G/15ML
3.38 INJECTION, POWDER, LYOPHILIZED, FOR SOLUTION INTRAVENOUS EVERY 6 HOURS
Status: DISCONTINUED | OUTPATIENT
Start: 2021-08-23 | End: 2021-08-25

## 2021-08-23 RX ORDER — URSODIOL 300 MG/1
300 CAPSULE ORAL 2 TIMES DAILY
Status: DISCONTINUED | OUTPATIENT
Start: 2021-08-23 | End: 2021-09-09 | Stop reason: HOSPADM

## 2021-08-23 RX ORDER — FUROSEMIDE 40 MG
40 TABLET ORAL DAILY
Status: DISCONTINUED | OUTPATIENT
Start: 2021-08-23 | End: 2021-09-06

## 2021-08-23 RX ORDER — PANTOPRAZOLE SODIUM 40 MG/1
40 TABLET, DELAYED RELEASE ORAL
Status: DISCONTINUED | OUTPATIENT
Start: 2021-08-24 | End: 2021-08-23

## 2021-08-23 RX ADMIN — PANTOPRAZOLE SODIUM 40 MG: 40 INJECTION, POWDER, FOR SOLUTION INTRAVENOUS at 09:08

## 2021-08-23 RX ADMIN — POLYETHYLENE GLYCOL 3350 17 G: 17 POWDER, FOR SOLUTION ORAL at 09:07

## 2021-08-23 RX ADMIN — POTASSIUM CHLORIDE 20 MEQ: 29.8 INJECTION, SOLUTION INTRAVENOUS at 04:11

## 2021-08-23 RX ADMIN — SENNOSIDES 8.6 MG: 8.6 TABLET ORAL at 09:07

## 2021-08-23 RX ADMIN — FUROSEMIDE 40 MG: 20 TABLET ORAL at 20:43

## 2021-08-23 RX ADMIN — ALBUMIN HUMAN 25 G: 0.05 INJECTION, SOLUTION INTRAVENOUS at 18:30

## 2021-08-23 RX ADMIN — PIPERACILLIN AND TAZOBACTAM 3.38 G: 3; .375 INJECTION, POWDER, FOR SOLUTION INTRAVENOUS at 22:58

## 2021-08-23 RX ADMIN — OXYCODONE HYDROCHLORIDE 5 MG: 5 TABLET ORAL at 22:01

## 2021-08-23 RX ADMIN — POTASSIUM CHLORIDE 20 MEQ: 29.8 INJECTION, SOLUTION INTRAVENOUS at 05:05

## 2021-08-23 RX ADMIN — URSODIOL 300 MG: 300 CAPSULE ORAL at 20:43

## 2021-08-23 RX ADMIN — PIPERACILLIN AND TAZOBACTAM 3.38 G: 3; .375 INJECTION, POWDER, FOR SOLUTION INTRAVENOUS at 16:53

## 2021-08-23 RX ADMIN — POTASSIUM CHLORIDE 20 MEQ: 750 TABLET, EXTENDED RELEASE ORAL at 20:43

## 2021-08-23 RX ADMIN — ENOXAPARIN SODIUM 40 MG: 100 INJECTION SUBCUTANEOUS at 09:07

## 2021-08-23 RX ADMIN — MEROPENEM 1 G: 1 INJECTION, POWDER, FOR SOLUTION INTRAVENOUS at 09:08

## 2021-08-23 RX ADMIN — LACTULOSE 20 G: 10 POWDER, FOR SOLUTION ORAL at 22:01

## 2021-08-23 RX ADMIN — AMIODARONE HYDROCHLORIDE 400 MG: 200 TABLET ORAL at 20:43

## 2021-08-23 RX ADMIN — AMIODARONE HYDROCHLORIDE 400 MG: 200 TABLET ORAL at 09:07

## 2021-08-23 RX ADMIN — OXYCODONE HYDROCHLORIDE 5 MG: 5 TABLET ORAL at 12:09

## 2021-08-23 ASSESSMENT — ACTIVITIES OF DAILY LIVING (ADL)
ADLS_ACUITY_SCORE: 15
ADLS_ACUITY_SCORE: 16
ADLS_ACUITY_SCORE: 16
ADLS_ACUITY_SCORE: 15

## 2021-08-23 NOTE — PLAN OF CARE
Major Shift Events:  Patient confused at times but oriented x4. Follows commands, denies pain. VSS on RA. Adequate UO via ferguson, no BM. 150 ml serosang output from drain. Distended abdomen. Potassium replaced.   Plan: continue to monitor, possibly transfer to floor later today.    For vital signs and complete assessments, please see documentation flowsheets.

## 2021-08-23 NOTE — PROGRESS NOTES
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 Unfortunately, developed ventricular tachycardia and hemodynamic instability during procedure requiring defibrillation and admission to ICU. ID consulted to evaluate for septic component of shock.    WBC elevated on admission to 15, note that her WBC count has been largely stable in 11-18 range. UA 8/19 with trace blood but otherwise unremarkable. BCx 8/19 NGTD. CRP was 40. Post procedurally WBC spiked to 40s then trended to mid 30s. Lactate debbie to 4 with improvement to normal. COVID PCR negative. Peritoneal fluid abd Cx 8/19 NGTD. SCx 8/19 ET tube NGTD. MRSA nares sent 8/20. CT PE protocol without e/o PE, tree in bud nodularity of MINI and LLL and L middle ribs, attn to follow up, 5 mm pulm nodule in RUL.     Patient started on Zosyn+Vanc+fluconazole but switched to meropenem + micafungin+ vanco. Now de-escalated to just meropenem. Will plan to further narrow at this time.    Patient has made very good improvement over the weekend, now extubated, off pressors, and awake and alert. Source of ventricular arrhythmia remains uncertain. EP cardiology is  consulting today. No clear evidence of infection but a GI source is still a leading suspect. Tentative plan for ERCP once cardiology workup is complete.      ID will continue to follow.     Eliecer Osborne MD   Infectious Diseases  Pager #[phone]        Interval History   Patient is awake and alert this morning. Pleasant. Significant other present. States her belly is starting to feel tight again, but no overt pain. She was not having symptoms consistent with her prior cholangitis episodes prior to surgery. No coughing or sputum production. No diarrhea.      Past Infx Hx:   -S mitis SBP and BSI 4/2021 s/p prolonged IV abx for possible liver abscess (-6/11, then PO Augmentin through 6/16).   -5/21 &6/11/21 indeterminate quant gold          Review of Systems:   5pt ROS performed, see HPI.         Current Medications:       amiodarone  400 mg Oral BID    Followed by     [START ON 8/31/2021] amiodarone  200 mg Oral Daily     enoxaparin ANTICOAGULANT  40 mg Subcutaneous Q24H     insulin aspart  1-6 Units Subcutaneous Q4H     meropenem  1 g Intravenous Q12H     pantoprazole (PROTONIX) IV  40 mg Intravenous Daily with breakfast     polyethylene glycol  17 g Oral Daily     sennosides  8.6 mg Oral BID     sodium chloride (PF)  3 mL Intravenous Q8H            Allergies:     Allergies   Allergen Reactions     Diagnostic X-Ray Materials Hives     PATIENT HAD HIVE REACTION AFTER ADMINISTERING CT CONTRAST DYE.       Contrast Dye             Physical Exam:   Vitals were reviewed  Patient Vitals for the past 24 hrs:   BP Temp Temp src Pulse Resp SpO2   08/23/21 1100 95/65 -- -- 71 -- 98 %   08/23/21 1000 95/69 -- -- 75 -- 96 %   08/23/21 0900 99/71 -- -- 79 -- 97 %   08/23/21 0800 (!) 84/50 97.7  F (36.5  C) Oral 75 17 96 %   08/23/21 0700 96/61 -- -- 70 15 98 %   08/23/21 0600 99/60 -- -- 68 13 97 %   08/23/21 0500 95/61 -- -- 68 17 97 %   08/23/21 0400 94/60 98  F (36.7  C) Oral 66 12 97 %   08/23/21 0300 (!) 87/57 -- -- 64 -- 98  %   08/23/21 0200 (!) 83/55 -- -- 64 -- 98 %   08/23/21 0100 90/54 -- -- 61 -- 98 %   08/23/21 0005 92/56 -- -- -- -- --   08/23/21 0000 (!) 83/54 97.3  F (36.3  C) Oral 59 16 98 %   08/22/21 2300 95/57 -- -- 59 14 99 %   08/22/21 2200 (!) 86/54 -- -- 58 -- 99 %   08/22/21 2100 103/60 -- -- 60 -- 100 %   08/22/21 2000 103/61 (!) 96.4  F (35.8  C) Axillary 58 20 99 %   08/22/21 1900 98/69 -- -- 62 -- 99 %   08/22/21 1700 91/64 -- -- 68 16 98 %   08/22/21 1600 97/58 97.6  F (36.4  C) Axillary 60 16 98 %   08/22/21 1500 104/65 -- -- 61 18 100 %   08/22/21 1400 94/58 97.2  F (36.2  C) -- 61 14 100 %   08/22/21 1300 92/55 97.3  F (36.3  C) -- 62 14 99 %   08/22/21 1230 91/57 97.2  F (36.2  C) -- 65 -- 100 %   08/22/21 1200 -- 97.2  F (36.2  C) Bladder 65 14 100 %   08/22/21 1130 -- 97  F (36.1  C) -- 69 -- 100 %       Physical Examination:  Constitutional: Female patient seen lying in bed. Extubated now. Pleasant and conversant.  HEENT: NC/AT, MMM, scleral icterus present   Respiratory: CTAB, no wheezing  Cardiovascular: RRR, no murmurs  GI: Distended and taught. No significant tenderness to palpation.  Skin: Warm and dry. No rashes or lesions on exposed surfaces. Somewhat jaundiced.  Musculoskeletal: Extremities grossly normal. No edema.  VAD: CVCs c/d/i.          Laboratory Data:     Inflammatory Markers    Recent Labs   Lab Test 08/19/21  0018 06/08/21  1602 06/01/21  0915 05/25/21  1455 05/21/21  1500 05/18/21  1010   CRP 40.0* 23.1* 16.1* 14.3* 16.1* 21.4*       Hematology Studies    Recent Labs   Lab Test 08/23/21  0323 08/22/21  1654 08/22/21  0430 08/21/21  0417 08/20/21  2357 08/20/21  1953 08/20/21  0419 08/19/21  2347 06/21/21  0938 06/12/21  1259 06/10/21  0702 06/09/21  1527 06/01/21  0915 05/12/21  0542 05/11/21  0724   WBC 11.8* 13.6* 17.7* 20.0*  --   --  31.3* 34.2*   < > 21.3* 22.8* 27.5* 11.5* 14.5* 18.3*   ANEU  --   --   --   --   --   --   --   --   --  18.5* 22.0* 25.9* 9.3* 11.3* 16.3*   AEOS  --    --   --   --   --   --   --   --   --  0.0 0.0 0.0 0.2 0.2 0.0   HGB 9.1* 8.9* 8.3* 8.4*  8.4* 8.5* 8.5* 10.3*  10.3* 10.2*  10.2*   < > 10.3* 9.8* 11.6* 11.3* 9.6* 9.7*   MCV 89 92 89 91  --   --  90 88   < > 95 94 96 93 94 92    160 197 269  --   --  405 405   < > 291 270 431 386 257 264    < > = values in this interval not displayed.       Metabolic Studies     Recent Labs   Lab Test 08/23/21 0323 08/22/21  1654 08/22/21  0430 08/21/21 0417 08/20/21  0849    138 136 135 135   POTASSIUM 3.4  3.4 3.5 3.7 4.0  4.0 4.0   CHLORIDE 108 108 107 107 106   CO2 19* 20 18* 19* 18*   BUN 50* 58* 58* 39* 25   CR 0.69 0.93 1.23* 1.14* 0.82   GFRESTIMATED >90 68 49* 54* 80       Hepatic Studies    Recent Labs   Lab Test 08/23/21 0323 08/22/21 1654 08/22/21  0430 08/21/21 2027 08/21/21  0417 08/20/21  2357   BILITOTAL 3.1* 3.1* 2.9* 3.1* 3.5* 4.0*   ALKPHOS 247* 233* 200* 186* 150 151*   ALBUMIN 3.0* 3.7 4.0 4.1 4.3 4.5   AST 42 41 43 44 43 49*   ALT 31 30 27 25 26 26       Microbiology:  Culture   Date Value Ref Range Status   08/19/2021 No growth after 3 days  Preliminary   08/19/2021 1+ Normal arleen  Final   08/19/2021 No anaerobic organisms isolated after 3 days  Preliminary   08/19/2021 No growth after 3 days  Preliminary   08/19/2021 No growth after 3 days  Preliminary   08/19/2021   Final    No Vancomycin Resistant Enterococcus species isolated   08/19/2021 No growth after 4 days  Preliminary       Urine Studies    Recent Labs   Lab Test 08/19/21  2135 08/19/21  0513 06/10/21  0632 05/21/21  0730 04/29/21  1106   LEUKEST Negative Negative Negative Negative Negative   WBCU 2 1 1 4 15*       Vancomycin Levels    Recent Labs   Lab Test 08/20/21 1953 06/11/21  0835   VANCOMYCIN 12.5 16.8       Hepatitis B Testing   Recent Labs   Lab Test 08/18/21 2132   HBCAB Nonreactive   HEPBANG Nonreactive     Hepatitis C Testing     Hepatitis C Antibody   Date Value Ref Range Status   08/18/2021 Nonreactive  Nonreactive Final     Respiratory Virus Testing    No results found for: RS, FLUAG    IMAGING    8/20 CXR   IMPRESSION:   1. Endotracheal tube now projects approximately 5.8 cm cephalad to the  caryn.  2. Decreased perihilar opacities, favoring atelectasis. No new focal  consolidative opacities.    8/19 CT PE  IMPRESSION:   1. No evidence of pulmonary embolism.  Contrast mixing artifact in the  left lower lobe superior segment pulmonary artery. No evidence of  right heart strain.  2. Endotracheal tube abutting the caryn.  3. Atelectasis/collapse of the posterior aspects of the bilateral  upper and lower lobes.   4. Tree in bud nodularity involving the left upper, left lower, and  the left middle ribs. Possibilities include infectious and  inflammatory etiologies. Recommend follow-up until resolution.  5. 5 mm pulmonary nodule of the right upper lobe, if the patient is  considered high risk for lung cancer consider follow-up low-dose chest  CT in 12 months.

## 2021-08-23 NOTE — PROGRESS NOTES
"[facility]    Hepatology Follow-up    CC: Vtach    Dx: Hepatic cirrhosis 2/2 PSC                  Vtach    24 hour events:   YOLANDA  Subjective:  Denies abd pain, no increase in edema    Medications  Current Facility-Administered Medications   Medication Dose Route Frequency     amiodarone  400 mg Oral BID    Followed by     [START ON 8/31/2021] amiodarone  200 mg Oral Daily     enoxaparin ANTICOAGULANT  40 mg Subcutaneous Q24H     insulin aspart  1-6 Units Subcutaneous Q4H     meropenem  1 g Intravenous Q12H     pantoprazole (PROTONIX) IV  40 mg Intravenous Daily with breakfast     polyethylene glycol  17 g Oral Daily     sennosides  8.6 mg Oral BID     sodium chloride (PF)  3 mL Intravenous Q8H       Review of systems  A 10-point review of systems was negative.    Examination  BP 96/61   Pulse 70   Temp 98  F (36.7  C) (Oral)   Resp 15   Ht 1.651 m (5' 5\")   Wt 65.5 kg (144 lb 6.4 oz)   SpO2 98%   BMI 24.03 kg/m      Intake/Output Summary (Last 24 hours) at 8/23/2021 0938  Last data filed at 8/23/2021 0500  Gross per 24 hour   Intake 397.84 ml   Output 960 ml   Net -562.16 ml       Constitutional: cooperative, pleasant  Eyes: Sclera icteric  Ears/nose/mouth/throat: mucus membranes moist  Neck: supple  CV: No edema  Respiratory: Unlabored breathing on O2  Abd: Nondistended, +bs, nontender, incisions in place  Skin: warm, perfused, jaundiced  Neuro: AAO x 3, no asterixis      Laboratory  Lab Results   Component Value Date     08/23/2021     06/25/2021    POTASSIUM 3.4 08/23/2021    POTASSIUM 3.4 08/23/2021    POTASSIUM 4.4 06/25/2021    CHLORIDE 108 08/23/2021    CHLORIDE 100 06/25/2021    CO2 19 08/23/2021    CO2 21 06/25/2021    BUN 50 08/23/2021    BUN 23 06/25/2021    CR 0.69 08/23/2021    CR 0.69 06/25/2021       Lab Results   Component Value Date    BILITOTAL 3.1 08/23/2021    BILITOTAL 3.3 06/25/2021    ALT 31 08/23/2021    ALT 32 06/25/2021    AST 42 08/23/2021    " AST 58 06/25/2021    ALKPHOS 247 08/23/2021    ALKPHOS 386 06/25/2021       Lab Results   Component Value Date    WBC 11.8 08/23/2021    WBC 15.2 06/25/2021    HGB 9.1 08/23/2021    HGB 10.7 06/25/2021    MCV 89 08/23/2021    MCV 92 06/25/2021     08/23/2021     06/25/2021       Lab Results   Component Value Date    INR 1.69 08/23/2021    INR 1.39 06/25/2021       MELD-Na score: 18 at 8/23/2021  3:23 AM  MELD score: 17 at 8/23/2021  3:23 AM  Calculated from:  Serum Creatinine: 0.69 mg/dL (Using min of 1 mg/dL) at 8/23/2021  3:23 AM  Serum Sodium: 136 mmol/L at 8/23/2021  3:23 AM  Total Bilirubin: 3.1 mg/dL at 8/23/2021  3:23 AM  INR(ratio): 1.69 at 8/23/2021  3:23 AM  Age: 55 years    Radiology  CT Chest 8/19/21:  IMPRESSION:   1. No evidence of pulmonary embolism.  Contrast mixing artifact in the  left lower lobe superior segment pulmonary artery. No evidence of  right heart strain.  2. Endotracheal tube abutting the caryn.  3. Atelectasis/collapse of the posterior aspects of the bilateral  upper and lower lobes.   4. Tree in bud nodularity involving the left upper, left lower, and  the left middle ribs. Possibilities include infectious and  inflammatory etiologies. Recommend follow-up until resolution.  5. 5 mm pulmonary nodule of the right upper lobe, if the patient is  considered high risk for lung cancer consider follow-up low-dose chest  CT in 12 months.      Assessment  55 year old with past medical history of hypertension, microscopic otitis, ulcerative colitis, pancreatic mucinous cystadenoma, PSC with cirrhosis who presented for liver transplantation but went into V. tach and surgery was aborted.  Patient was requiring pressor support and was transferred to ICU.    Patient has been extubated and not on pressors, her clinical course is more consistent with V. tach causing instability, less likely to be cholangitis given remarkable improvement.  Awaiting EP recommendation for  optimization.    Recommendations  -- Recommend to continue monitoring cultures r/o infection  -- Consult EP  -- Lactulose PO, or Miralax  -- Monitor neurologic status, high risk of progression  -- Obtain ultrasound abd with dopplers  -- Monitor transaminases, bilirubin, INR  -- Diagnostic paracentesis with cell count, gram stain and culture, protein and albumin, as well as cytology  -- Ensure sodium restriction to 2000 mg per day  -- Restart home diuretics  -- Adequate protein diet (1.2 - 1.5g/kg/day)   - Recommend multiple meals during the day, Snacks and shakes during the day/night   - Can use Ensure/Boost drinks TID                  - Dietician consultation      Thank you for involving us in this patient's care. Please do not hesitate to contact the GI service with any questions or concerns.      Pt care plan discussed with Dr. Leventhal, Hepatology staff physician.    This note was created with voice recognition software, and while reviewed for accuracy, typos may remain.    Audra Fuentes MD  [facility] Hepatology Fellow  Pager: [phone]

## 2021-08-23 NOTE — PLAN OF CARE
Major Shift Events:  Jurado discontinued this morning. Still has yet to void, fluids encouraged. Diet advanced to regular. Pt has orders for transfer to medicine tele. Pt walked halls with rehab. ROMA drain to gravity drained 500cc for shift. Replaced with Albumin.   Plan: Encourage pt to increase activity as tolerated. Continue to monitor pt. For vital signs and complete assessments, please see documentation flowsheets.      Problem: Adult Inpatient Plan of Care  Goal: Plan of Care Review  Outcome: Improving

## 2021-08-23 NOTE — PROGRESS NOTES
Nutrition Services - Brief Note    Pt extubated yesterday, TF never started.    Interventions already implemented by the RD:  Discontinued enteral nutrition order set.    Recommendations:  Advance diet as tolerated.    RD will continue to follow.  Estela Coles RD, LD  ([facility] dietitian, [id number] (Mon-Fri))

## 2021-08-23 NOTE — CONSULTS
[facility]    Brief Cardiology Consult Note      Date of Admission:  8/18/2021  Consult Requested by: Dr. Lisbeth Porter  Reason for Consult: Pre-op evaluation, risk assessment    Assessment & Plan: HVSL   Berta Nava is a 55 year old female currently under evaluation for possible liver transplant who recently was about to undergo transplant, however developed VT necessitating multiple shocks as procedure began causing the surgical team to abort procedure. 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 No recurrence since initial episode after amiodarone use and has been weaned from pressors and extubated. Had undergone evaluation prior to previous transplant attempt with dobutamine stress echocardiography. In reviewing the patient's current clinical status and VT episodes, unclear precipitating cause of this event but could consider electrolyte abnormality. However it cannot be definitively known if ischemia may have played a role even with normal echocardiogram post-VT episode. In light of this coupled with the false negative rate of dobutamine stress (particularly when a drop in BP was noted with procedure), it is reasonable to obtain further ischemic evaluation for patient. For most definitive answer, would consider pursuit of coronary angiography to rule out significant coronary lesion as possible factor for VT episode.        The patient's care was discussed with the Attending Physician, Dr. Moseley.    Joanne Coates MD  [facility]  Pager: [phone]    ______________________________________________________________________    Chief Complaint   Pre  op    History is obtained from the patient and chart    History of Present Illness   Berta Nava is a 55 year old female currently under evaluation for possible liver transplant who recently was about to undergo transplant, however developed VT necessitating multiple shocks as procedure began causing the surgical team to abort procedure. Cardiology consulted for pre-operative evaluation in setting of this.     Noting some mild abdominal pain today but no complaints of chest pain, SOB, palpitations, LH.     Review of Systems   The 10 point Review of Systems is negative other than noted in the HPI or here.     Past Medical History    I have reviewed this patient's medical history and updated it with pertinent information if needed.   Past Medical History:   Diagnosis Date     Ascites      Biliary cirrhosis (H)      Cholangitis, sclerosing      Cirrhosis of liver with ascites (H) 3/3/2021     Hearing loss of left ear     wears a hearing aide     History of low potassium      Hyperlipidemia      Hypertension      SBP (spontaneous bacterial peritonitis) (H) 4/30/2021     Sjogren's syndrome (H)      Ulcerative colitis (H)      Ulcerative pancolitis (H)        Past Surgical History   I have reviewed this patient's surgical history and updated it with pertinent information if needed.  Past Surgical History:   Procedure Laterality Date     ENDOSCOPIC RETROGRADE CHOLANGIOPANCREATOGRAM N/A 6/25/2021    Procedure: ENDOSCOPIC RETROGRADE CHOLANGIOPANCREATOGRAPHY with ballon sweep of bile ducts for stones, balloon dilation of bile ducts and bile duct stent placement;  Surgeon: Gregory Gabriel MD;  Location:  OR     ENDOSCOPIC RETROGRADE CHOLANGIOPANCREATOGRAM N/A 7/22/2021    Procedure: ENDOSCOPIC RETROGRADE CHOLANGIOPANCREATOGRAPHY STONE REMOVAL, DILATION AND STENT PLACEMENT;  Surgeon: Gregory Gabriel MD;  Location:  OR     ENDOSCOPIC RETROGRADE CHOLANGIOPANCREATOGRAPHY, EXCHANGE TUBE/STENT N/A 05/05/2021    Procedure:  ENDOSCOPIC RETROGRADE CHOLANGIOPANCREATOGRAPHY WITH STENT EXCHANGE, STONE EXTRACTION, AND DILATION;  Surgeon: Gregory Gabriel MD;  Location: [facility] OR     ENDOSCOPIC RETROGRADE CHOLANGIOPANCREATOGRAPHY, EXCHANGE TUBE/STENT N/A 5/10/2021    Procedure: ENDOSCOPIC RETROGRADE CHOLANGIOPANCREATOGRAPHY with biliary dilation, stone removal, stent exchange;  Surgeon: Gregory Gabriel MD;  Location: [facility] OR     ENDOSCOPIC RETROGRADE CHOLANGIOPANCREATOGRAPHY, EXCHANGE TUBE/STENT N/A 6/10/2021    Procedure: ENDOSCOPIC RETROGRADE CHOLANGIOPANCREATOGRAPHY, WITH biliary stent exchange, stone removal;  Surgeon: Woodrow Lott MD;  Location: [facility] OR     GYN SURGERY      bilat fallopian tubes and ovaries removed     PICC DOUBLE LUMEN PLACEMENT Right 2021    42cm (2cm external), Lateral brachial vein     TRANSPLANT LIVER RECIPIENT  DONOR N/A 2021    Procedure: Opening of abdomen, Abdominal Exploration and aborted liver transplant.;  Surgeon: Ernesto Schmitt MD;  Location: [facility] OR       Social History   I have reviewed this patient's social history and updated it with pertinent information if needed.  Social History     Tobacco Use     Smoking status: Never Smoker     Smokeless tobacco: Never Used   Substance Use Topics     Alcohol use: No     Comment: Last drink was 2017     Drug use: No     Family History   I have reviewed this patient's family history and updated it with pertinent information if needed.   I have reviewed this patient's family history and updated it with pertinent information if needed.  Family History   Problem Relation Age of Onset     Cancer Mother         angiosarcoma     Coronary Artery Disease Early Onset Father         MI age 46     Heart Transplant Father      Liver Disease No family hx of      Ulcerative Colitis No family hx of      Crohn's Disease No family hx of        Medications   I have reviewed this patient's current medications    Allergies   Allergies   Allergen Reactions      Diagnostic X-Ray Materials Hives     PATIENT HAD HIVE REACTION AFTER ADMINISTERING CT CONTRAST DYE.       Contrast Dye        Physical Exam   Vital Signs: Temp: 97.7  F (36.5  C) Temp src: Oral BP: 95/65 Pulse: 71   Resp: 17 SpO2: 98 % O2 Device: None (Room air) Oxygen Delivery: 2 LPM  Weight: 144 lbs 6.42 oz    General Appearance: middle aged woman in NAD  HEENT: at/nc, eomi, mmm  Respiratory: ctab on RA, non labored  Cardiovascular: rrr, s1, s2, no murmur  GI: soft, slightly distended, incision appears c/d/i closed with staples  Skin: warm and dry  Musculoskeletal: 2+BLE edema, no gross deformity  Neurologic: awake, alert, interactive        Data   Results for orders placed or performed during the hospital encounter of 08/18/21 (from the past 24 hour(s))   Hepatic panel   Result Value Ref Range    Bilirubin Total 3.1 (H) 0.2 - 1.3 mg/dL    Bilirubin Direct 2.1 (H) 0.0 - 0.2 mg/dL    Protein Total 6.1 (L) 6.8 - 8.8 g/dL    Albumin 3.7 3.4 - 5.0 g/dL    Alkaline Phosphatase 233 (H) 40 - 150 U/L    AST 41 0 - 45 U/L    ALT 30 0 - 50 U/L   Basic metabolic panel   Result Value Ref Range    Sodium 138 133 - 144 mmol/L    Potassium 3.5 3.4 - 5.3 mmol/L    Chloride 108 94 - 109 mmol/L    Carbon Dioxide (CO2) 20 20 - 32 mmol/L    Anion Gap 10 3 - 14 mmol/L    Urea Nitrogen 58 (H) 7 - 30 mg/dL    Creatinine 0.93 0.52 - 1.04 mg/dL    Calcium 9.5 8.5 - 10.1 mg/dL    Glucose 110 (H) 70 - 99 mg/dL    GFR Estimate 68 >60 mL/min/1.73m2   CBC with platelets   Result Value Ref Range    WBC Count 13.6 (H) 4.0 - 11.0 10e3/uL    RBC Count 2.96 (L) 3.80 - 5.20 10e6/uL    Hemoglobin 8.9 (L) 11.7 - 15.7 g/dL    Hematocrit 27.1 (L) 35.0 - 47.0 %    MCV 92 78 - 100 fL    MCH 30.1 26.5 - 33.0 pg    MCHC 32.8 31.5 - 36.5 g/dL    RDW 16.3 (H) 10.0 - 15.0 %    Platelet Count 160 150 - 450 10e3/uL   INR   Result Value Ref Range    INR 1.66 (H) 0.85 - 1.15   Glucose by meter   Result Value Ref Range    GLUCOSE BY METER POCT 108 (H) 70 - 99 mg/dL    Glucose by meter   Result Value Ref Range    GLUCOSE BY METER POCT 108 (H) 70 - 99 mg/dL   Glucose by meter   Result Value Ref Range    GLUCOSE BY METER POCT 89 70 - 99 mg/dL   Hepatic panel   Result Value Ref Range    Bilirubin Total 3.1 (H) 0.2 - 1.3 mg/dL    Bilirubin Direct 2.0 (H) 0.0 - 0.2 mg/dL    Protein Total 5.4 (L) 6.8 - 8.8 g/dL    Albumin 3.0 (L) 3.4 - 5.0 g/dL    Alkaline Phosphatase 247 (H) 40 - 150 U/L    AST 42 0 - 45 U/L    ALT 31 0 - 50 U/L   Basic metabolic panel   Result Value Ref Range    Sodium 136 133 - 144 mmol/L    Potassium 3.4 3.4 - 5.3 mmol/L    Chloride 108 94 - 109 mmol/L    Carbon Dioxide (CO2) 19 (L) 20 - 32 mmol/L    Anion Gap 9 3 - 14 mmol/L    Urea Nitrogen 50 (H) 7 - 30 mg/dL    Creatinine 0.69 0.52 - 1.04 mg/dL    Calcium 8.9 8.5 - 10.1 mg/dL    Glucose 83 70 - 99 mg/dL    GFR Estimate >90 >60 mL/min/1.73m2   CBC with platelets   Result Value Ref Range    WBC Count 11.8 (H) 4.0 - 11.0 10e3/uL    RBC Count 3.06 (L) 3.80 - 5.20 10e6/uL    Hemoglobin 9.1 (L) 11.7 - 15.7 g/dL    Hematocrit 27.1 (L) 35.0 - 47.0 %    MCV 89 78 - 100 fL    MCH 29.7 26.5 - 33.0 pg    MCHC 33.6 31.5 - 36.5 g/dL    RDW 16.0 (H) 10.0 - 15.0 %    Platelet Count 163 150 - 450 10e3/uL   Magnesium   Result Value Ref Range    Magnesium 2.3 1.6 - 2.3 mg/dL   Phosphorus   Result Value Ref Range    Phosphorus 3.0 2.5 - 4.5 mg/dL   INR   Result Value Ref Range    INR 1.69 (H) 0.85 - 1.15   Potassium   Result Value Ref Range    Potassium 3.4 3.4 - 5.3 mmol/L   Glucose by meter   Result Value Ref Range    GLUCOSE BY METER POCT 84 70 - 99 mg/dL   Glucose by meter   Result Value Ref Range    GLUCOSE BY METER POCT 77 70 - 99 mg/dL   Glucose by meter   Result Value Ref Range    GLUCOSE BY METER POCT 86 70 - 99 mg/dL   Cell count with differential fluid    Narrative    The following orders were created for panel order Cell count with differential fluid.  Procedure                               Abnormality         Status                      ---------                               -----------         ------                     Cell Count Body Fluid[[id number]]                            In process                   Please view results for these tests on the individual orders.   Albumin fluid   Result Value Ref Range    Albumin fluid 1.1 g/dL    Narrative    No reference ranges have been established.  This result should be interpreted in the context of the patient's clinical condition and compared to simultaneous measurement in the patient's blood.   Protein fluid   Result Value Ref Range    Protein Total Fluid 2.0 g/dL    Narrative    No reference ranges have been established.  This result should be interpreted in the context of the patient's clinical condition and compared to simultaneous measurement in the patient's blood.   Lactate dehydrogenase fluid   Result Value Ref Range    Lactate dehydrogenase fluid 122 U/L    Narrative    No reference ranges have been established.  This result should be interpreted in the context of the patient's clinical condition and compared to simultaneous measurement in the patient's blood.

## 2021-08-23 NOTE — PROGRESS NOTES
MEDICAL ICU PROGRESS NOTE  08/23/2021        Date of Service (when I saw the patient): 08/23/2021    ASSESSMENT: Berta Nava is a 55 year old female with 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 She presented in stable condition from home for potential liver transplant. Patient was taken to the OR. Intraop, abdomen was opened, ascites was evacuated and falciform and left triangular ligament divided. At this time, patient developed vtach requiring 5-6 rounds of debrillation and the procedure was aborted. Consideration for starting ECMO, but patient converted out. Patient incisions closed. Taken to CT for concern for PE. CT showed no signs of PE. Comes to SICU 8/19 due to unstable hemodynamics on 3 pressors. Transferred to MICU 8/22 after coming of pressors.      CHANGES and MAJOR THINGS TODAY:   - cardiology consulted for gerardo-operative assessment for liver transplant   - oxycodone 5mg Q4H prn   - transfer to floor   - Abx U/S with doppler and diagnostic paracentesis   - discontinue PPI as patient no longer intubated   - discontinue Meropenem per ID and transition to Zosyn     PLAN:  Neuro/Pain/Sedation:  #Post operative sedation and pain control   -no longer on sedation     Pulmonary:  #Post operative respiratory failure, resolved   -Extubated    Cardiovascular:  #Intraoperative V tach requiring 4 rounds of defibrillation   #Distributive vs hypovolemic shock   Patient had wnl stress ECHO pre-op with EF 60-65%. Intraoperative v tach requiring defibrillation, came to SICU on 3 pressors Stat TTE post op 8/19: Global and regional left ventricular function is normal with an EF of 60-65%. Off pressors since 8/20  - MAP goal > 55  - Amiodarone po taper  - Cards consulted, appreciate recs   - Hepatology requesting risk stratification for repeat tx attempt    GI/Nutrition:   # Hepatic Cirrhosis Secondary to PSC/UC  - ROMA drain x1,  consider removing if tx surgery agrees  - Hepatology consulted, appreciate recs   -restart home diuretics   -diagnostic paracentesis    -Abx U/S with doppler      Renal/Fluids/Electrolytes:  # GONZALO (BL Creat 0.7), resolved   -continue to monitor kidney function and urine output     Endocrine:  Stress hyperglycemia  - sliding scale insulin      ID/Antibiotics:  Sepsis  Unclear source. Initially to possibly be cholangitis but less likely after discussing with transplant hepatology. May be an intraabdominal source. OR, blood, and sputum cultures with NGTD. EBV IgG positive but IgM not reactive   -discontinued Meropenem, start Zosyn ID      Heme:   #Acute blood loss anemia    S/p transfusion: 3 units intraoperative   -Goals: Hb > 8, Fibrinogen >200, INR < 2, plts > 50  -Continue to trend Hb      Prophylaxis:  -DVT: mechanical SCD, hold heparin perioperatively  -discontinue PPI, no longer intubated      MSK:  -PT and OT will need to be consulted      Lines/ tubes/ drains:  ETT, Peripheral IV x3, arterial line, RIJ MAC x 2, NG, ferguson, JPx1     Disposition:  -floor today       Patient seen, findings and plan discussed with surgical ICU staff, Dr. Charlotte Porter MD PGY-2     ====================================  INTERVAL HISTORY:   Patient endorses continued abdominal pain. Denies nausea or vomiting. She continues to have drainage from ROMA drain that is set to gravity.     OBJECTIVE:   1. VITAL SIGNS:   Temp:  [96.4  F (35.8  C)-98  F (36.7  C)] 97.4  F (36.3  C)  Pulse:  [58-79] 73  Resp:  [12-20] 16  BP: ()/(50-71) 105/70  SpO2:  [96 %-100 %] 99 %  Ventilation Mode: CMV/AC  (Continuous Mandatory Ventilation/ Assist Control)  FiO2 (%): 30 %  Rate Set (breaths/minute): 12 breaths/min  Tidal Volume Set (mL): 400 mL  PEEP (cm H2O): 5 cmH2O  Oxygen Concentration (%): 30 %  Resp: 16    2. INTAKE/ OUTPUT:   I/O last 3 completed shifts:  In: 720 [P.O.:270; I.V.:450]  Out: 1300 [Urine:800; Drains:500]    3. PHYSICAL  EXAMINATION:  General: awake, sitting up  HEENT:NC/AT   Pulm/Resp: no increased work of breathing, symmetric chest expansion   CV: RRR, S1. S2 present, no murmur appreciated   Abdomen: Soft, mildly distended, non-tender, no rebound tenderness or guarding, no masses ROMA drain w/ serosanguinous fluids.   Incisions/Skin: no jaundice,   MSK/Extremities: trace peripheral edema, moving all extremities, calves soft, extremities well perfused    4. INVESTIGATIONS:   Arterial Blood Gases   Recent Labs   Lab 08/22/21  1055 08/20/21  0851 08/20/21  0421 08/19/21  2347   PH 7.38 7.33* 7.31* 7.32*   PCO2 34* 35 30* 29*   PO2 106* 115* 101 90   HCO3 20* 18* 15* 15*     Complete Blood Count   Recent Labs   Lab 08/23/21  0323 08/22/21  1654 08/22/21  0430 08/21/21  0417   WBC 11.8* 13.6* 17.7* 20.0*   HGB 9.1* 8.9* 8.3* 8.4*  8.4*    160 197 269     Basic Metabolic Panel  Recent Labs   Lab 08/23/21  1213 08/23/21  0905 08/23/21  0323 08/22/21  1654 08/22/21  0430 08/21/21  0417   NA  --   --  136 138 136 135   POTASSIUM  --   --  3.4  3.4 3.5 3.7 4.0  4.0   CHLORIDE  --   --  108 108 107 107   CO2  --   --  19* 20 18* 19*   BUN  --   --  50* 58* 58* 39*   CR  --   --  0.69 0.93 1.23* 1.14*   GLC 86 77 83  84 110* 122* 150*     Liver Function Tests  Recent Labs   Lab 08/23/21  0323 08/22/21  1654 08/22/21  0430 08/21/21 2027 08/19/21  1839   AST 42 41 43 44  --    ALT 31 30 27 25  --    ALKPHOS 247* 233* 200* 186*  --    BILITOTAL 3.1* 3.1* 2.9* 3.1*  --    ALBUMIN 3.0* 3.7 4.0 4.1  --    INR 1.69* 1.66* 1.63*  --  1.51*     Pancreatic Enzymes  Recent Labs   Lab 08/18/21  2132   AMYLASE 68     Coagulation Profile  Recent Labs   Lab 08/23/21  0323 08/22/21  1654 08/22/21  0430 08/19/21  1839 08/18/21  2132   INR 1.69* 1.66* 1.63* 1.51* 1.45*   PTT  --   --   --   --  30     5. RADIOLOGY:   Recent Results (from the past 24 hour(s))   US Abd Hepatic & Portal Vasculature Cmpl    Narrative    EXAMINATION: US ABD HEPATIC &  PORTAL VASCULATURE  8/23/2021 3:37  PM     COMPARISON: Abdominal ultrasound with Doppler, 8/19/2021.    HISTORY: Rule out PVT      TECHNIQUE: The abdomen was scanned in standard fashion with  specialized ultrasound transducer(s) using both gray-scale, color  Doppler, and spectral flow techniques.    Findings:  Only liver Doppler was performed. Gray-scale evaluation of the  abdominal organs was not done.  Extrahepatic portal vein flow is antegrade at 15 cm/s. The main portal  vein measures 0.9 cm.  Right portal vein flow is antegrade, measuring 16 cm/s.  Left portal vein flow is retrograde, measuring 14 cm.    Flow in the hepatic artery is towards the liver and:  59 cm/s peak systolic  0.82 resistive index.     The splenic vein is not visualized.  The left, middle, and right  hepatic veins are patent with flow towards the IVC. The IVC is patent  with flow towards the heart.      Fluid: Small ascites between the diaphragm and the right lobe  measuring 1.8 x 2.1 x 4.7 cm.      Impression    Impression:   1.  Cirrhotic liver with retrograde flow in the left portal vein  suggestive of portal hypertension. No evidence for portal venous  thrombosis.  2.  Mild ascites.    I have personally reviewed the examination and initial interpretation  and I agree with the findings.    MARTINEZ JAVED MD         SYSTEM ID:  [id number]       =========================================

## 2021-08-23 NOTE — PROGRESS NOTES
08/23/21 1000   Quick Adds   Type of Visit Initial PT Evaluation   Living Environment   People in home spouse   Current Living Arrangements house   Home Accessibility stairs to enter home;stairs within home   Number of Stairs, Main Entrance 2   Stair Railings, Main Entrance none   Number of Stairs, Within Home, Primary 8  (8)   Stair Railings, Within Home, Primary railing on right side (ascending)   Transportation Anticipated car, drives self;family or friend will provide   Living Environment Comments Patient lives with spouse in 3 level home with basement, only needs to access main level and 2nd level where bedroom/bathroom are located. Has very supportive family,  and daughter able to provide 24hr assist if needed.   Self-Care   Usual Activity Tolerance good   Current Activity Tolerance fair   Regular Exercise Yes   Activity/Exercise Type strength training;walking   Exercise Amount/Frequency 3-5 times/wk   Equipment Currently Used at Home none   Activity/Exercise/Self-Care Comment Patient is IND with all mobillity and self cares at baseline. 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   Disability/Function   Hearing Difficulty or Deaf yes   Describe hearing loss hearing loss on left side   Use of hearing assistive devices left hearing aid   Wear Glasses or Blind yes   Vision Management reading glasses   Concentrating, Remembering or Making Decisions Difficulty no   Difficulty Communicating no   Difficulty Eating/Swallowing no   Walking or Climbing Stairs Difficulty no   Dressing/Bathing Difficulty no   Toileting issues no   Doing Errands Independently Difficulty (such as shopping) no   Fall history within last six months no   Change in Functional Status Since Onset of Current Illness/Injury yes   General Information   Onset of Illness/Injury or Date of Surgery 08/19/21   Referring Physician Kevin Magallanes MD   Pertinent History of Current Problem (include personal factors and/or comorbidities that  impact the POC) 55 year old female with 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 Transplant aborted due to episode of VT requiring cardioversion and hypotension requiring pressors.   Existing Precautions/Restrictions abdominal   Cognition   Orientation Status (Cognition) oriented x 4   Affect/Mental Status (Cognition) WFL   Follows Commands (Cognition) WFL   Pain Assessment   Patient Currently in Pain Yes, see Vital Sign flowsheet   Posture    Posture Protracted shoulders   Range of Motion (ROM)   ROM Comment BLE WFL   Strength   Strength Comments generalized post-op weakness and deconditioning   Bed Mobility   Comment (Bed Mobility) Not assessed, patient greeted sitting up in chair.   Transfers   Transfer Safety Comments sit<>stand with min/mod A up to FWW   Gait/Stairs (Locomotion)   Comment (Gait/Stairs) ambulates with FWW and CGA   Balance   Balance Comments impaired standing balance, requiring FWW for stability with standing and ambulation   Clinical Impression   Criteria for Skilled Therapeutic Intervention yes, treatment indicated   PT Diagnosis (PT) impaired functional mobility   Influenced by the following impairments strength, balance, activity tolerance, pain, abdominal precautions   Functional limitations due to impairments bed mobility, transfers, gait, stairs, functional endurance   Clinical Presentation Evolving/Changing   Clinical Presentation Rationale PMH/comorbidities, clinical judgement   Clinical Decision Making (Complexity) moderate complexity   Therapy Frequency (PT) 6x/week   Predicted Duration of Therapy Intervention (days/wks) 1-2 weeks   Planned Therapy Interventions (PT) balance training;bed mobility training;gait training;patient/family education;stair training;strengthening;transfer training;progressive activity/exercise;home exercise program   Risk & Benefits of therapy have been explained  evaluation/treatment results reviewed;care plan/treatment goals reviewed;current/potential barriers reviewed;participants voiced agreement with care plan;participants included;patient;spouse/significant other;daughter   PT Discharge Planning    PT Discharge Recommendation (DC Rec) home with assist   PT Rationale for DC Rec Patient below IND functional baseline, but overall moving well, primarily limited by pain and deconditioning. Anticipate patient will be able to return home with assist from family when medically ready for discharge.   PT Brief overview of current status  min/mod A sit<>stands, ambulates with FWW and CGA   Total Evaluation Time   Total Evaluation Time (Minutes) 8

## 2021-08-23 NOTE — PROGRESS NOTES
08/23/21 1500   Quick Adds   Type of Visit Initial Occupational Therapy Evaluation   Living Environment   People in home spouse   Current Living Arrangements house   Home Accessibility stairs to enter home;stairs within home   Number of Stairs, Main Entrance 2   Stair Railings, Main Entrance none   Number of Stairs, Within Home, Primary 8   Stair Railings, Within Home, Primary railing on right side (ascending)   Transportation Anticipated car, drives self;family or friend will provide   Living Environment Comments Patient lives with spouse in 3 level home with basement, only needs to access main level and 2nd level where bedroom/bathroom are located. Pt has walk-in shower. Has very supportive family,  and daughter able to provide 24hr assist if needed.    Self-Care   Usual Activity Tolerance good   Current Activity Tolerance fair   Regular Exercise Yes   Activity/Exercise Type walking;strength training   Exercise Amount/Frequency 3-5 times/wk   Equipment Currently Used at Home none   Activity/Exercise/Self-Care Comment Patient is IND with all mobillity and self cares at baseline. 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   Disability/Function   Hearing Difficulty or Deaf yes   Wear Glasses or Blind yes   Vision Management reading glasses    Concentrating, Remembering or Making Decisions Difficulty no   Difficulty Communicating no   Difficulty Eating/Swallowing no   Walking or Climbing Stairs Difficulty no   Dressing/Bathing Difficulty no   Toileting issues no   Doing Errands Independently Difficulty (such as shopping) no   Fall history within last six months no   Change in Functional Status Since Onset of Current Illness/Injury yes   General Information   Onset of Illness/Injury or Date of Surgery 08/19/21   Referring Physician Kevin Magallanes   Patient/Family Therapy Goal Statement (OT) return home    Additional Occupational Profile Info/Pertinent History of Current Problem 55 year old female with hx of  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 Transplant aborted due to episode of VT requiring cardioversion and hypotension requiring pressors.   Existing Precautions/Restrictions abdominal   Cognitive Status Examination   Orientation Status orientation to person, place and time   Affect/Mental Status (Cognitive) WNL   Follows Commands WNL;increased processing time needed   Visual Perception   Visual Impairment/Limitations WNL   Sensory   Sensory Quick Adds No deficits were identified   Pain Assessment   Patient Currently in Pain Yes, see Vital Sign flowsheet   Range of Motion Comprehensive   General Range of Motion no range of motion deficits identified   Strength Comprehensive (MMT)   Comment, General Manual Muscle Testing (MMT) Assessment not formally assessed 2/2 to post op precautions, general deconditioning    Transfers   Transfer Comments CGA for sit > stand from recliner    Balance   Balance Comments Pt ambulated hallway with CGA + FWW    Activities of Daily Living   BADL Assessment lower body dressing   Lower Body Dressing Assessment   Vance Level (Lower Body Dressing) minimum assist (75% patient effort)   Clinical Impression   Criteria for Skilled Therapeutic Interventions Met (OT) yes   OT Diagnosis decreased ADL I    OT Problem List-Impairments impacting ADL activity tolerance impaired;cognition;strength;post-surgical precautions;pain   Assessment of Occupational Performance 3-5 Performance Deficits   Identified Performance Deficits bed mobility, g/h tasks, dressing, bathing, home management    Planned Therapy Interventions (OT) ADL retraining;IADL retraining;balance training;bed mobility training;strengthening;transfer training;home program guidelines;progressive activity/exercise;cognition   Clinical Decision Making Complexity (OT) low complexity   Therapy Frequency (OT) Daily   Predicted Duration of Therapy 1 week     Risk & Benefits of therapy have been explained evaluation/treatment results reviewed   Comment-Clinical Impression Pt presents below baseline and would benefit from continued IP OT to progress independence with ADLs. Anticipate with further progress with therapies that pt will be able to return home. Pt with strong family support and progressing well.    OT Discharge Planning    OT Discharge Recommendation (DC Rec) Home with assist   OT Rationale for DC Rec Pt presents below baseline and would benefit from continued IP OT to progress independence with ADLs. Anticipate with further progress with therapies that pt will be able to return home. Pt with strong family support and progressing well.    OT Brief overview of current status  CGA + FWW    Total Evaluation Time (Minutes)   Total Evaluation Time (Minutes) 5

## 2021-08-23 NOTE — PROGRESS NOTES
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 Transplant aborted due to episode of VT requiring cardioversion and hypotension requiring pressors. Patient transferred to SICU for hemodynamic monitoring. Cardiology, ID, and Panc/biliary team consulted.     Neuro:  Acute post op pain: Continue PRN oxycodone.   Cardiorespiratory:   Hypotension secondary to shock: Off pressors. Resolved.  Ventricular tachycardia: EP Cards consulted. On amiodarone, switched to oral. K > 4 and Mg > 2. Telemetry. Per cardiology, likely not primarily a cardiac issue, but rather sepsis, electrolyte abnormalities. Troponin trended down.  Post operative respiratory failure: Resolved.  Hematology:   Anemia of chronic disease: Hgb ~9, stable.   GI/Nutrition: CLD.  PSC with cirrhosis/UC: Ascites. Surgery drain to gravity, output 210 mL yesterday and 90 mL since midnight. PTA ursodiol not started. Per Hepatology, plan for Abd US with dopplers and diagnostic paracentesis.   Fluid/Electrolytes:   GONZALO: Cr 0.7 (down from 1.2), resolving. Avoid nephrotoxins.   Endocrine:   Stress/steroid hyperglycemia: Continue sliding scale insulin every 4 hours. Hydrocortisone stopped 8/21.   : Jurado to remain for strict I & O monitoring  Infectious disease: Afebrile, WBC 34.2->31.3->20->11.8.   Septic shock: Consulted ID. Suspect GI source. Fluid gram stain negative. Fluid cx and blood cx in process, no growth. On Meropenem, plan to change to Zosyn today per ID. Vanco and micafungin stopped.   Prophylaxis: DVT (heparin SQ), GI (PPI)  Activity: PT/OT  Disposition: SICU    Medical Decision Making: High  Subsequent visit 13066 (high level decision making)    [place]/Fellow/Resident Provider: Adelita Gomez NP [id number]    Faculty: Nasir Rojas MD  "PhD  __________________________________________________________________  Transplant History: Admitted 8/18/2021 for liver transplant.  N/A, Postoperative day:      Interval History:  No acute events overnight. Seen resting in bed. C/o pain near incision.     ROS:   A 10-point review of systems was negative except as noted above.    Meds:    amiodarone  400 mg Oral BID    Followed by     [START ON 8/31/2021] amiodarone  200 mg Oral Daily     enoxaparin ANTICOAGULANT  40 mg Subcutaneous Q24H     insulin aspart  1-6 Units Subcutaneous Q4H     piperacillin-tazobactam  3.375 g Intravenous Q6H     polyethylene glycol  17 g Oral Daily     sennosides  8.6 mg Oral BID     sodium chloride (PF)  3 mL Intravenous Q8H       Physical Exam:     Admit Weight: 59.5 kg (131 lb 2.8 oz)    Current vitals:   /64   Pulse 71   Temp 97.4  F (36.3  C) (Oral)   Resp 17   Ht 1.651 m (5' 5\")   Wt 65.5 kg (144 lb 6.4 oz)   SpO2 97%   BMI 24.03 kg/m        Vital sign ranges:    Temp:  [96.4  F (35.8  C)-98  F (36.7  C)] 97.4  F (36.3  C)  Pulse:  [58-79] 71  Resp:  [12-20] 17  BP: ()/(50-71) 106/64  SpO2:  [96 %-100 %] 97 %  Patient Vitals for the past 24 hrs:   BP Temp Temp src Pulse Resp SpO2   08/23/21 1200 106/64 97.4  F (36.3  C) Oral 71 -- 97 %   08/23/21 1100 95/65 -- -- 71 -- 98 %   08/23/21 1000 95/69 -- -- 75 -- 96 %   08/23/21 0900 99/71 -- -- 79 -- 97 %   08/23/21 0800 (!) 84/50 97.7  F (36.5  C) Oral 75 17 96 %   08/23/21 0700 96/61 -- -- 70 15 98 %   08/23/21 0600 99/60 -- -- 68 13 97 %   08/23/21 0500 95/61 -- -- 68 17 97 %   08/23/21 0400 94/60 98  F (36.7  C) Oral 66 12 97 %   08/23/21 0300 (!) 87/57 -- -- 64 -- 98 %   08/23/21 0200 (!) 83/55 -- -- 64 -- 98 %   08/23/21 0100 90/54 -- -- 61 -- 98 %   08/23/21 0005 92/56 -- -- -- -- --   08/23/21 0000 (!) 83/54 97.3  F (36.3  C) Oral 59 16 98 %   08/22/21 2300 95/57 -- -- 59 14 99 %   08/22/21 2200 (!) 86/54 -- -- 58 -- 99 %   08/22/21 2100 103/60 -- -- 60 -- " 100 %   08/22/21 2000 103/61 (!) 96.4  F (35.8  C) Axillary 58 20 99 %   08/22/21 1900 98/69 -- -- 62 -- 99 %   08/22/21 1700 91/64 -- -- 68 16 98 %   08/22/21 1600 97/58 97.6  F (36.4  C) Axillary 60 16 98 %   08/22/21 1500 104/65 -- -- 61 18 100 %   08/22/21 1400 94/58 97.2  F (36.2  C) -- 61 14 100 %   08/22/21 1300 92/55 97.3  F (36.3  C) -- 62 14 99 %     General Appearance: NAD   Skin: no rash  Heart: NSR  Lungs: unlabored on RA  Abdomen: Incision closed with staples, open to air. ROMA with serosanguinous output.  : Jurado is present; UO fabiola.  Extremities: +edema  Neurologic: awake    Data:   CMP  Recent Labs   Lab 08/23/21  1213 08/23/21  0905 08/23/21  0323 08/22/21  1654 08/22/21  0430 08/19/21  1424 08/19/21  1316 08/19/21  1222 08/19/21  1222 08/18/21  2132   NA  --   --  136 138 136  --  133   < > 130* 126*   POTASSIUM  --   --  3.4  3.4 3.5 3.7  --  3.5   < > 3.9 3.9   CHLORIDE  --   --  108 108 107  --   --    < >  --  98   CO2  --   --  19* 20 18*  --   --    < >  --  20   GLC 86 77 83  84 110* 122*   < > 128*   < > 122* 100*   BUN  --   --  50* 58* 58*  --   --    < >  --  28   CR  --   --  0.69 0.93 1.23*  --   --    < >  --  0.65   GFRESTIMATED  --   --  >90 68 49*  --   --    < >  --  >90   MARIELENA  --   --  8.9 9.5 9.2  --   --    < >  --  8.6   ICAW  --   --   --   --   --   --  4.6  --  4.7  --    MAG  --   --  2.3  --  2.6*  --   --    < >  --  2.0   PHOS  --   --  3.0  --  5.4*  --   --    < >  --  3.7   AMYLASE  --   --   --   --   --   --   --   --   --  68   ALBUMIN  --   --  3.0* 3.7 4.0  --   --    < >  --  2.0*   BILITOTAL  --   --  3.1* 3.1* 2.9*  --   --    < >  --  3.6*   ALKPHOS  --   --  247* 233* 200*  --   --    < >  --  400*   AST  --   --  42 41 43  --   --    < >  --  66*   ALT  --   --  31 30 27  --   --    < >  --  36    < > = values in this interval not displayed.     CBC  Recent Labs   Lab 08/23/21  0323 08/22/21  1654   HGB 9.1* 8.9*   WBC 11.8* 13.6*    160      COAGS  Recent Labs   Lab 08/23/21  0323 08/22/21  1654 08/18/21  2132   INR 1.69* 1.66* 1.45*   PTT  --   --  30      Urinalysis  Recent Labs   Lab Test 08/19/21  2135 08/19/21  0513 05/21/21  0730   COLOR Yellow Dark Yellow* Dark Yellow   APPEARANCE Slightly Cloudy* Clear Clear   URINEGLC Negative Negative Negative   URINEBILI Small* Small* Negative   URINEKETONE Negative Negative Negative   SG 1.025 1.019 1.031   UBLD Trace* Negative Negative   URINEPH 5.0 5.5 6.0   PROTEIN 10 * Negative 20*   NITRITE Negative Negative Negative   LEUKEST Negative Negative Negative   RBCU 6* <1 1   WBCU 2 1 4   UTPG  --   --  0.26*

## 2021-08-24 ENCOUNTER — APPOINTMENT (OUTPATIENT)
Dept: PHYSICAL THERAPY | Facility: CLINIC | Age: 56
DRG: 420 | End: 2021-08-24
Attending: TRANSPLANT SURGERY
Payer: COMMERCIAL

## 2021-08-24 ENCOUNTER — COMMITTEE REVIEW (OUTPATIENT)
Dept: TRANSPLANT | Facility: CLINIC | Age: 56
End: 2021-08-24

## 2021-08-24 ENCOUNTER — APPOINTMENT (OUTPATIENT)
Dept: OCCUPATIONAL THERAPY | Facility: CLINIC | Age: 56
DRG: 420 | End: 2021-08-24
Attending: TRANSPLANT SURGERY
Payer: COMMERCIAL

## 2021-08-24 LAB
ALBUMIN SERPL-MCNC: 3.4 G/DL (ref 3.4–5)
ALP SERPL-CCNC: 307 U/L (ref 40–150)
ALT SERPL W P-5'-P-CCNC: 34 U/L (ref 0–50)
ANION GAP SERPL CALCULATED.3IONS-SCNC: 7 MMOL/L (ref 3–14)
AST SERPL W P-5'-P-CCNC: 52 U/L (ref 0–45)
BACTERIA BLD CULT: NO GROWTH
BACTERIA BLD CULT: NO GROWTH
BACTERIA PRT CULT: NO GROWTH
BILIRUB DIRECT SERPL-MCNC: 2.8 MG/DL (ref 0–0.2)
BILIRUB SERPL-MCNC: 4.2 MG/DL (ref 0.2–1.3)
BUN SERPL-MCNC: 37 MG/DL (ref 7–30)
CALCIUM SERPL-MCNC: 9.1 MG/DL (ref 8.5–10.1)
CHLORIDE BLD-SCNC: 108 MMOL/L (ref 94–109)
CO2 SERPL-SCNC: 21 MMOL/L (ref 20–32)
CREAT SERPL-MCNC: 0.58 MG/DL (ref 0.52–1.04)
ERYTHROCYTE [DISTWIDTH] IN BLOOD BY AUTOMATED COUNT: 15.9 % (ref 10–15)
GFR SERPL CREATININE-BSD FRML MDRD: >90 ML/MIN/1.73M2
GLUCOSE BLD-MCNC: 83 MG/DL (ref 70–99)
GLUCOSE BLDC GLUCOMTR-MCNC: 80 MG/DL (ref 70–99)
GLUCOSE BLDC GLUCOMTR-MCNC: 82 MG/DL (ref 70–99)
HCT VFR BLD AUTO: 27.6 % (ref 35–47)
HGB BLD-MCNC: 9 G/DL (ref 11.7–15.7)
INR PPP: 1.81 (ref 0.85–1.15)
MAGNESIUM SERPL-MCNC: 2.1 MG/DL (ref 1.6–2.3)
MCH RBC QN AUTO: 29.1 PG (ref 26.5–33)
MCHC RBC AUTO-ENTMCNC: 32.6 G/DL (ref 31.5–36.5)
MCV RBC AUTO: 89 FL (ref 78–100)
PHOSPHATE SERPL-MCNC: 2.5 MG/DL (ref 2.5–4.5)
PLATELET # BLD AUTO: 160 10E3/UL (ref 150–450)
POTASSIUM BLD-SCNC: 3.7 MMOL/L (ref 3.4–5.3)
PROT SERPL-MCNC: 5.5 G/DL (ref 6.8–8.8)
RBC # BLD AUTO: 3.09 10E6/UL (ref 3.8–5.2)
SODIUM SERPL-SCNC: 136 MMOL/L (ref 133–144)
WBC # BLD AUTO: 11.3 10E3/UL (ref 4–11)

## 2021-08-24 PROCEDURE — 85610 PROTHROMBIN TIME: CPT | Performed by: NURSE PRACTITIONER

## 2021-08-24 PROCEDURE — 83735 ASSAY OF MAGNESIUM: CPT | Performed by: NURSE PRACTITIONER

## 2021-08-24 PROCEDURE — 97530 THERAPEUTIC ACTIVITIES: CPT | Mod: GP

## 2021-08-24 PROCEDURE — 250N000013 HC RX MED GY IP 250 OP 250 PS 637: Performed by: STUDENT IN AN ORGANIZED HEALTH CARE EDUCATION/TRAINING PROGRAM

## 2021-08-24 PROCEDURE — P9041 ALBUMIN (HUMAN),5%, 50ML: HCPCS

## 2021-08-24 PROCEDURE — 99233 SBSQ HOSP IP/OBS HIGH 50: CPT | Mod: GC | Performed by: STUDENT IN AN ORGANIZED HEALTH CARE EDUCATION/TRAINING PROGRAM

## 2021-08-24 PROCEDURE — 120N000002 HC R&B MED SURG/OB UMMC

## 2021-08-24 PROCEDURE — 250N000013 HC RX MED GY IP 250 OP 250 PS 637: Performed by: NURSE PRACTITIONER

## 2021-08-24 PROCEDURE — 82248 BILIRUBIN DIRECT: CPT | Performed by: STUDENT IN AN ORGANIZED HEALTH CARE EDUCATION/TRAINING PROGRAM

## 2021-08-24 PROCEDURE — 250N000013 HC RX MED GY IP 250 OP 250 PS 637: Performed by: TRANSPLANT SURGERY

## 2021-08-24 PROCEDURE — 80048 BASIC METABOLIC PNL TOTAL CA: CPT | Performed by: NURSE PRACTITIONER

## 2021-08-24 PROCEDURE — 250N000011 HC RX IP 250 OP 636: Performed by: NURSE PRACTITIONER

## 2021-08-24 PROCEDURE — 250N000011 HC RX IP 250 OP 636: Performed by: STUDENT IN AN ORGANIZED HEALTH CARE EDUCATION/TRAINING PROGRAM

## 2021-08-24 PROCEDURE — 97535 SELF CARE MNGMENT TRAINING: CPT | Mod: GO

## 2021-08-24 PROCEDURE — 99024 POSTOP FOLLOW-UP VISIT: CPT | Performed by: TRANSPLANT SURGERY

## 2021-08-24 PROCEDURE — 99232 SBSQ HOSP IP/OBS MODERATE 35: CPT | Mod: GC | Performed by: INTERNAL MEDICINE

## 2021-08-24 PROCEDURE — 97116 GAIT TRAINING THERAPY: CPT | Mod: GP

## 2021-08-24 PROCEDURE — 258N000003 HC RX IP 258 OP 636: Performed by: STUDENT IN AN ORGANIZED HEALTH CARE EDUCATION/TRAINING PROGRAM

## 2021-08-24 PROCEDURE — 250N000012 HC RX MED GY IP 250 OP 636 PS 637: Performed by: STUDENT IN AN ORGANIZED HEALTH CARE EDUCATION/TRAINING PROGRAM

## 2021-08-24 PROCEDURE — 250N000011 HC RX IP 250 OP 636

## 2021-08-24 PROCEDURE — 84100 ASSAY OF PHOSPHORUS: CPT | Performed by: NURSE PRACTITIONER

## 2021-08-24 PROCEDURE — 99233 SBSQ HOSP IP/OBS HIGH 50: CPT | Performed by: STUDENT IN AN ORGANIZED HEALTH CARE EDUCATION/TRAINING PROGRAM

## 2021-08-24 PROCEDURE — 85027 COMPLETE CBC AUTOMATED: CPT | Performed by: NURSE PRACTITIONER

## 2021-08-24 RX ORDER — DIPHENHYDRAMINE HYDROCHLORIDE 50 MG/ML
25 INJECTION INTRAMUSCULAR; INTRAVENOUS ONCE
Status: COMPLETED | OUTPATIENT
Start: 2021-08-25 | End: 2021-08-25

## 2021-08-24 RX ORDER — DIPHENHYDRAMINE HYDROCHLORIDE 50 MG/ML
25 INJECTION INTRAMUSCULAR; INTRAVENOUS ONCE
Status: DISCONTINUED | OUTPATIENT
Start: 2021-08-24 | End: 2021-08-24

## 2021-08-24 RX ORDER — PREDNISONE 50 MG/1
50 TABLET ORAL ONCE
Status: COMPLETED | OUTPATIENT
Start: 2021-08-24 | End: 2021-08-24

## 2021-08-24 RX ORDER — POTASSIUM CHLORIDE 750 MG/1
20 TABLET, EXTENDED RELEASE ORAL ONCE
Status: COMPLETED | OUTPATIENT
Start: 2021-08-24 | End: 2021-08-24

## 2021-08-24 RX ORDER — SODIUM CHLORIDE 9 MG/ML
INJECTION, SOLUTION INTRAVENOUS CONTINUOUS
Status: DISCONTINUED | OUTPATIENT
Start: 2021-08-24 | End: 2021-08-25 | Stop reason: HOSPADM

## 2021-08-24 RX ORDER — ASPIRIN 325 MG
325 TABLET ORAL ONCE
Status: COMPLETED | OUTPATIENT
Start: 2021-08-24 | End: 2021-08-24

## 2021-08-24 RX ORDER — LIDOCAINE 40 MG/G
CREAM TOPICAL
Status: DISCONTINUED | OUTPATIENT
Start: 2021-08-24 | End: 2021-08-25 | Stop reason: HOSPADM

## 2021-08-24 RX ORDER — ASPIRIN 81 MG/1
243 TABLET, CHEWABLE ORAL ONCE
Status: COMPLETED | OUTPATIENT
Start: 2021-08-24 | End: 2021-08-24

## 2021-08-24 RX ORDER — PREDNISONE 50 MG/1
50 TABLET ORAL ONCE
Status: COMPLETED | OUTPATIENT
Start: 2021-08-25 | End: 2021-08-25

## 2021-08-24 RX ADMIN — ENOXAPARIN SODIUM 40 MG: 100 INJECTION SUBCUTANEOUS at 09:02

## 2021-08-24 RX ADMIN — PIPERACILLIN AND TAZOBACTAM 3.38 G: 3; .375 INJECTION, POWDER, FOR SOLUTION INTRAVENOUS at 05:29

## 2021-08-24 RX ADMIN — AMIODARONE HYDROCHLORIDE 400 MG: 200 TABLET ORAL at 20:15

## 2021-08-24 RX ADMIN — ALBUMIN HUMAN 25 G: 0.05 INJECTION, SOLUTION INTRAVENOUS at 06:42

## 2021-08-24 RX ADMIN — ASPIRIN 325 MG ORAL TABLET 325 MG: 325 PILL ORAL at 18:54

## 2021-08-24 RX ADMIN — POTASSIUM & SODIUM PHOSPHATES POWDER PACK 280-160-250 MG 1 PACKET: 280-160-250 PACK at 20:15

## 2021-08-24 RX ADMIN — PIPERACILLIN AND TAZOBACTAM 3.38 G: 3; .375 INJECTION, POWDER, FOR SOLUTION INTRAVENOUS at 22:57

## 2021-08-24 RX ADMIN — PIPERACILLIN AND TAZOBACTAM 3.38 G: 3; .375 INJECTION, POWDER, FOR SOLUTION INTRAVENOUS at 16:43

## 2021-08-24 RX ADMIN — LACTULOSE 20 G: 10 POWDER, FOR SOLUTION ORAL at 20:27

## 2021-08-24 RX ADMIN — PIPERACILLIN AND TAZOBACTAM 3.38 G: 3; .375 INJECTION, POWDER, FOR SOLUTION INTRAVENOUS at 10:56

## 2021-08-24 RX ADMIN — FUROSEMIDE 40 MG: 20 TABLET ORAL at 09:03

## 2021-08-24 RX ADMIN — OXYCODONE HYDROCHLORIDE 5 MG: 5 TABLET ORAL at 20:15

## 2021-08-24 RX ADMIN — ALBUMIN HUMAN 25 G: 0.05 INJECTION, SOLUTION INTRAVENOUS at 00:06

## 2021-08-24 RX ADMIN — POTASSIUM & SODIUM PHOSPHATES POWDER PACK 280-160-250 MG 1 PACKET: 280-160-250 PACK at 09:03

## 2021-08-24 RX ADMIN — URSODIOL 300 MG: 300 CAPSULE ORAL at 20:15

## 2021-08-24 RX ADMIN — SODIUM CHLORIDE: 9 INJECTION, SOLUTION INTRAVENOUS at 22:57

## 2021-08-24 RX ADMIN — URSODIOL 300 MG: 300 CAPSULE ORAL at 09:04

## 2021-08-24 RX ADMIN — PREDNISONE 50 MG: 50 TABLET ORAL at 18:53

## 2021-08-24 RX ADMIN — POTASSIUM CHLORIDE 20 MEQ: 750 TABLET, EXTENDED RELEASE ORAL at 05:29

## 2021-08-24 RX ADMIN — ALBUMIN HUMAN 12.5 G: 0.05 INJECTION, SOLUTION INTRAVENOUS at 18:01

## 2021-08-24 RX ADMIN — SPIRONOLACTONE 100 MG: 100 TABLET, FILM COATED ORAL at 09:03

## 2021-08-24 RX ADMIN — OXYCODONE HYDROCHLORIDE 5 MG: 5 TABLET ORAL at 12:33

## 2021-08-24 RX ADMIN — AMIODARONE HYDROCHLORIDE 400 MG: 200 TABLET ORAL at 09:01

## 2021-08-24 RX ADMIN — ALBUMIN HUMAN 25 G: 0.05 INJECTION, SOLUTION INTRAVENOUS at 13:29

## 2021-08-24 RX ADMIN — LACTULOSE 20 G: 10 POWDER, FOR SOLUTION ORAL at 09:03

## 2021-08-24 ASSESSMENT — ACTIVITIES OF DAILY LIVING (ADL)
ADLS_ACUITY_SCORE: 16

## 2021-08-24 ASSESSMENT — MIFFLIN-ST. JEOR: SCORE: 1265.88

## 2021-08-24 NOTE — PROGRESS NOTES
Transplant Surgery  Inpatient Daily Progress Note  08/24/2021    Assessment & Plan: ,name is a 55 year old female with PMH of PSC/UC  hepatic cirrhosis complicated by ascites, biliary strictures and choledocholithiasis, recurrent cholangitis. OR 8/19 for potential transplant but was aborted due to episode of VT requiring cardioversion and hypotension requiring pressors. Patient transferred to SICU for hemodynamic monitoring. Cardiology, ID, and Panc/biliary team consulted.     Neuro:  Acute post op pain: Continue PRN oxycodone.   Cardiorespiratory:   Hypotension secondary to shock: Off pressors since 8/20. Stable   Ventricular tachycardia: EP Cards consulted. On amiodarone, switched to oral. K > 4 and Mg > 2. Telemetry. Per cardiology, likely not primarily a cardiac issue, but rather sepsis, electrolyte abnormalities. Troponin trended down.wnl stress ECHO pre-op with EF 60-65%. Will undergo angio tomorrow.  Post operative respiratory failure: Resolved. On RA  Hematology:   Anemia of chronic disease/ABL : Hgb ~9, stable. Received 3 units PRBC intraop   GI/Nutrition: CLD.  PSC with cirrhosis/UC: Ascites. Surgery drain to gravity, output 1.3 L/24 hrs with an increased 500cc in the past hour. PTA ursodiol not started.   Hepatology following. Abd US 8/23, cirrhosis and ascites with portal HTN , no PVT. Diagnostic paracentesis planned for today.   Fluid/Electrolytes:   GONZALO: Cr 0.6 (down from 1.2), resolving. Avoid nephrotoxins.   Endocrine:   Stress/steroid hyperglycemia: Sliding scale insulin every 4 hours as needed. FBS 80s. Not requiring insulin. Hydrocortisone stopped 8/21.   :  strict I & O monitoring in a critically ill patient  Infectious disease: Afebrile, WBC 11.3 (14.3).   Septic shock: Consulted ID. Suspect GI source. Fluid gram stain negative. Fluid cx and blood cx in process, no growth.  Meropenem8/20-8/23, transitioned to Zosyn 8/23-per ID.     Prophylaxis: DVT (heparin SQ), GI (PPI)  Activity:  "PT/OT  Disposition: SICU, transfer to Floor, medicine primary    Medical Decision Making: High  Subsequent visit 47860 (high level decision making)    [place]/Fellow/Resident Provider: Nini Zhou, NP [phone]    Faculty: Nasir Rojas MD PhD  __________________________________________________________________  Transplant History: Admitted 8/18/2021 for liver transplant.  N/A, Postoperative day:      Interval History:  No acute events overnight. Seen resting in bed.  Improving incisional pain.     ROS:   A 10-point review of systems was negative except as noted above.    Meds:    amiodarone  400 mg Oral BID    Followed by     [START ON 8/31/2021] amiodarone  200 mg Oral Daily     enoxaparin ANTICOAGULANT  40 mg Subcutaneous Q24H     furosemide  40 mg Oral Daily     insulin aspart  1-6 Units Subcutaneous Q4H     lactulose  20 g Oral BID     piperacillin-tazobactam  3.375 g Intravenous Q6H     potassium & sodium phosphates  1 packet Oral or Feeding Tube BID     sodium chloride (PF)  3 mL Intravenous Q8H     spironolactone  100 mg Oral Daily     ursodiol  300 mg Oral BID       Physical Exam:     Admit Weight: 59.5 kg (131 lb 2.8 oz)    Current vitals:   BP 94/60 (BP Location: Left arm)   Pulse 79   Temp 98.4  F (36.9  C) (Oral)   Resp 16   Ht 1.651 m (5' 5\")   Wt 68 kg (149 lb 14.6 oz)   SpO2 97%   BMI 24.95 kg/m        Vital sign ranges:    Temp:  [97.4  F (36.3  C)-98.4  F (36.9  C)] 98.4  F (36.9  C)  Pulse:  [69-82] 79  Resp:  [15-17] 16  BP: ()/(50-75) 94/60  SpO2:  [96 %-100 %] 97 %  Patient Vitals for the past 24 hrs:   BP Temp Temp src Pulse Resp SpO2 Weight   08/24/21 0500 -- -- -- 79 -- 97 % --   08/24/21 0400 94/60 98.4  F (36.9  C) Oral 82 16 97 % 68 kg (149 lb 14.6 oz)   08/24/21 0300 -- -- Oral 78 -- 98 % --   08/24/21 0100 -- -- -- 74 -- 99 % --   08/24/21 0000 105/68 97.7  F (36.5  C) Oral 75 16 99 % --   08/23/21 2300 -- -- -- 75 -- 100 % --   08/23/21 2200 103/62 -- -- 78 -- 98 % --   08/23/21 " 2000 108/75 97.5  F (36.4  C) Oral 81 16 100 % --   08/23/21 1800 103/70 -- -- 73 -- 100 % --   08/23/21 1700 -- -- -- 73 -- 99 % --   08/23/21 1600 -- -- -- 78 17 100 % --   08/23/21 1500 -- -- -- 69 -- 99 % --   08/23/21 1400 105/70 -- -- 73 -- 99 % --   08/23/21 1300 100/64 -- -- 74 -- 96 % --   08/23/21 1200 106/64 97.4  F (36.3  C) Oral 71 16 97 % --   08/23/21 1100 95/65 -- -- 71 -- 98 % --   08/23/21 1000 95/69 -- -- 75 -- 96 % --   08/23/21 0900 99/71 -- -- 79 -- 97 % --   08/23/21 0800 (!) 84/50 97.7  F (36.5  C) Oral 75 17 96 % --   08/23/21 0700 96/61 -- -- 70 15 98 % --     General Appearance: NAD   Skin: no rash  Heart: NSR  Lungs: unlabored on RA  Abdomen: Incision closed with staples, open to air. ROMA with serosanguinous output.  : Jurado is present; UO fabiola.  Extremities: +edema  Neurologic: awake    Data:   CMP  Recent Labs   Lab 08/24/21  0350 08/24/21  0339 08/23/21  1635 08/23/21  0323 08/19/21  1424 08/19/21  1316 08/19/21  1222 08/19/21  1222 08/18/21  2132   NA  --  136 135 136  --  133   < > 130* 126*   POTASSIUM  --  3.7 3.5 3.4  3.4  --  3.5   < > 3.9 3.9   CHLORIDE  --  108 106 108  --   --    < >  --  98   CO2  --  21 20 19*  --   --    < >  --  20   GLC 82 83 113* 83  84   < > 128*   < > 122* 100*   BUN  --  37* 47* 50*  --   --    < >  --  28   CR  --  0.58 0.61 0.69  --   --    < >  --  0.65   GFRESTIMATED  --  >90 >90 >90  --   --    < >  --  >90   MARIELENA  --  9.1 9.4 8.9  --   --    < >  --  8.6   ICAW  --   --   --   --   --  4.6  --  4.7  --    MAG  --  2.1  --  2.3  --   --    < >  --  2.0   PHOS  --  2.5  --  3.0  --   --    < >  --  3.7   AMYLASE  --   --   --   --   --   --   --   --  68   ALBUMIN  --  3.4 3.4 3.0*  --   --    < >  --  2.0*   BILITOTAL  --  4.2* 4.3* 3.1*  --   --    < >  --  3.6*   ALKPHOS  --  307* 368* 247*  --   --    < >  --  400*   AST  --  52* 59* 42  --   --    < >  --  66*   ALT  --  34 39 31  --   --    < >  --  36    < > = values in this interval not  displayed.     CBC  Recent Labs   Lab 08/24/21  0339 08/23/21  1635   HGB 9.0* 10.5*   WBC 11.3* 14.3*    201     COAGS  Recent Labs   Lab 08/24/21  0339 08/23/21  1635 08/18/21  2132   INR 1.81* 1.50* 1.45*   PTT  --   --  30      Urinalysis  Recent Labs   Lab Test 08/19/21  2135 08/19/21  0513 05/21/21  0730   COLOR Yellow Dark Yellow* Dark Yellow   APPEARANCE Slightly Cloudy* Clear Clear   URINEGLC Negative Negative Negative   URINEBILI Small* Small* Negative   URINEKETONE Negative Negative Negative   SG 1.025 1.019 1.031   UBLD Trace* Negative Negative   URINEPH 5.0 5.5 6.0   PROTEIN 10 * Negative 20*   NITRITE Negative Negative Negative   LEUKEST Negative Negative Negative   RBCU 6* <1 1   WBCU 2 1 4   UTPG  --   --  0.26*

## 2021-08-24 NOTE — COMMITTEE REVIEW
Abdominal Committee Review Note     Evaluation Date: 3/23/2021  Committee Review Date: 8/24/2021    Organ being evaluated for: Liver    Transplant Phase: Waitlist  Transplant Status: Active    Transplant Coordinator: Wilmer Lazar Jr.  Transplant Surgeon:   Ernesto Schmitt    Referring Physician: Venus London    Primary Diagnosis: Primary Sclerosing Cholangitis: Ulcerative Colitis  Secondary Diagnosis:     Committee Review Members:  Nutrition Vania Cornejo, RD   Pharmacist Dana Trujillo, [facility]    - Clinical JOSETTE Lara, Katie Mejia, [facility]   Transplant Marjorie Reyes, ADRIANA, Lucy James, RN, Monalisa Bar, ADRIANA, Jr Wilmer Lazar, ADRIANA, Simona Traylor MD, Andria Rowe, APRN CNP, Sue Romero RN   Transplant Hepatology  Emma Mao MD, Fabio Gotti MD, Audra Fuentes MD, Thomas M. Leventhal, MD   Transplant Surgery Hussain Bernard MD, Ernesto Schmitt MD, Taqueria Peterson MD, Gonzalo Mackay MD       Transplant Eligibility: Cirrhosis with MELD, PSC    Committee Review Decision: Make Inactive    Relative Contraindications: Other, further cardiology work-up and clearance    Absolute Contraindications: None    Committee Chair Leventhal, Thomas Michael, MD verbally attested to the committee's decision.    Committee Discussion Details:     Move to inactive on the list pending cardiology work-up and clearance

## 2021-08-24 NOTE — PROGRESS NOTES
[facility]    Progress Note - Cassie 4 Service        Date of Admission:  8/18/2021    Assessment & Plan             Berta AYE Nava is a 55 year old female with 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 She presented in stable condition from home for potential liver transplant. Patient was taken to the OR. Intraop, abdomen was opened, ascites was evacuated and falciform and left triangular ligament divided. At this time, patient developed vtach requiring 5-6 rounds of debrillation and the procedure was aborted. Consideration for starting ECMO, but patient converted out. Patient incisions closed. Taken to CT for concern for PE. CT showed no signs of PE. Initially admitted to SICU due to unstable hemodynamics on 3 pressors. Transferred to MICU 8/22 after coming of pressors. Transferred to floor 8/23.    # Hepatic Cirrhosis Secondary to PSC/UC  Patient being reevaluated for liver transplantation due to arrhythmias. Currently inactive on transplant list.  - ROMA drain x1, continue per transplant surgery, maintain 1:1 albumin  - Hepatology consulted, appreciate recs               -continue home diuretics               -diagnostic ascites studies obtained and as below               -Abx U/S with doppler completed  - Continue PTA ursodiol   - Lactulose 20 BID ordered, PRN senna and miralax    #. Intraoperative V tach requiring 4 rounds of defibrillation   #. Distributive vs hypovolemic shock, resolved   Patient had wnl stress ECHO pre-op with EF 60-65%. Intraoperative v tach requiring defibrillation, came to SICU on 3 pressors. TTE post op 8/19 with global and regional left ventricular function is normal with an EF of 60-65%. Off pressors since 8/20.  - MAP goal > 55  - Amiodarone PO taper per EP  - Cards consulted, appreciate recs  - Plan for coronary angiogram 8/15, patient in need of  pretreatment given contrast allergy     #. Sepsis  Unclear source. Initially thought to possibly be cholangitis but less likely per discussion with transplant hepatology. Greatest concern for an intraabdominal source. OR, blood, and sputum cultures with NGTD. EBV IgG positive but IgM not reactive.   - ID consulted  - CTM Cx  - Ascitic fluid analysis pending, cell counts appear reactive, gram stain w/o organisms   - Continue zosyn    #. Post Operative Abdominal Pain  - Oxycodone 5 mg q4 PRN    #. Acute blood loss anemia    S/p transfusion: 3 units intraoperative   - Goals: Hb > 8, Fibrinogen >200, INR < 2, plts > 50  - Continue to trend Hb     #. Post operative respiratory failure, resolved   - Extubated, on RA    #. GONZALO (BL Creat 0.7), resolved   - continue to monitor kidney function and urine output     #. Stress hyperglycemia, resolved  In setting of steroid administration, no longer needing insulin over past several days  - sliding scale insulin discontinued    #. Deconditioning  - Will plan to consult PT and OT pending further transplant evaluation     Diet: Room Service  Combination Diet Regular Diet Adult; 2 gm NA Diet    DVT Prophylaxis: Enoxaparin (Lovenox) SQ  Jurado Catheter: Not present  Fluids: PO  Central Lines: None  Code Status: Full Code      Disposition Plan   Expected discharge: >3 days recommended to TBD once transplant evaluation completed.     The patient's care was discussed with the Attending Physician, Dr. Hay.    Augustine Pena MD  [address]  Securely message with the Vocera Web Console (learn more here)  Text page via [facility] Paging/Directory  Please see sign in/sign out for up to date coverage information    Clinically Significant Risk Factors Present on Admission                ______________________________________________________________________    Interval History   Patient required straight catheterization last evening.  Reports she was able to void spontaneously this AM. Patient reports abdominal discomfort is present but improved. She otherwise denies any symptoms at this time. Patient without acute concerns this AM.    4 point ROS otherwise negative    Data reviewed today: I reviewed all medications, new labs and imaging results over the last 24 hours.    Physical Exam   Vital Signs: Temp: 98.5  F (36.9  C) Temp src: Oral BP: 97/57 Pulse: 81   Resp: 17 SpO2: 98 % O2 Device: None (Room air)    Weight: 149 lbs 14.6 oz  General: awake, sitting up  HEENT:NC/AT, scleral icterus present    Pulm/Resp: no increased work of breathing, symmetric chest expansion   CV: RRR, S1. S2 present, no murmur appreciated   Abdomen: Soft, mildly distended, non-tender, no rebound tenderness or guarding, no masses ROMA drain w/ serosanguinous fluids.   Incisions/Skin: no jaundice   MSK/Extremities: trace peripheral edema, moving all extremities, calves soft, extremities well perfused    Data   Recent Labs   Lab 08/24/21  0821 08/24/21  0350 08/24/21  0339 08/23/21  1635 08/23/21  0323 08/20/21  1707 08/20/21  1533 08/20/21  0859 08/20/21  0849 08/20/21  0419 08/20/21  0418   WBC  --   --  11.3* 14.3* 11.8*   < >  --   --   --   --   --    HGB  --   --  9.0* 10.5* 9.1*   < > 8.3*   < > 9.4*  --   --    MCV  --   --  89 91 89   < >  --   --   --   --   --    PLT  --   --  160 201 163   < >  --   --   --   --   --    INR  --   --  1.81* 1.50* 1.69*   < >  --   --   --   --   --    NA  --   --  136 135 136   < >  --   --  135  --  133   POTASSIUM  --   --  3.7 3.5 3.4  3.4   < >  --   --  4.0  --  4.0   CHLORIDE  --   --  108 106 108   < >  --   --  106  --  104   CO2  --   --  21 20 19*   < >  --   --  18*  --  14*   BUN  --   --  37* 47* 50*   < >  --   --  25  --  24   CR  --   --  0.58 0.61 0.69   < >  --   --  0.82  --  0.78   ANIONGAP  --   --  7 9 9   < >  --   --  11  --  15*   MARIELENA  --   --  9.1 9.4 8.9   < >  --   --  8.7  --  9.4   GLC 80 82 83 113*  83  84   < >  --   --  176*   < > 199*   ALBUMIN  --   --  3.4 3.4 3.0*   < > 4.2   < > 3.9  --   --    PROTTOTAL  --   --  5.5* 6.0* 5.4*   < > 6.2*   < > 6.7*  --   --    BILITOTAL  --   --  4.2* 4.3* 3.1*   < > 4.5*   < > 5.1*  --   --    ALKPHOS  --   --  307* 368* 247*   < > 157*   < > 196*  --   --    ALT  --   --  34 39 31   < > 24   < > 28  --   --    AST  --   --  52* 59* 42   < > 52*   < > 59*  --   --    TROPONIN  --   --   --   --   --   --  0.242*  --  0.311*  --  0.325*    < > = values in this interval not displayed.     Recent Results (from the past 24 hour(s))   US Abd Hepatic & Portal Vasculature Cmpl    Narrative    EXAMINATION: US ABD HEPATIC & PORTAL VASCULATURE  8/23/2021 3:37  PM     COMPARISON: Abdominal ultrasound with Doppler, 8/19/2021.    HISTORY: Rule out PVT      TECHNIQUE: The abdomen was scanned in standard fashion with  specialized ultrasound transducer(s) using both gray-scale, color  Doppler, and spectral flow techniques.    Findings:  Only liver Doppler was performed. Gray-scale evaluation of the  abdominal organs was not done.  Extrahepatic portal vein flow is antegrade at 15 cm/s. The main portal  vein measures 0.9 cm.  Right portal vein flow is antegrade, measuring 16 cm/s.  Left portal vein flow is retrograde, measuring 14 cm.    Flow in the hepatic artery is towards the liver and:  59 cm/s peak systolic  0.82 resistive index.     The splenic vein is not visualized.  The left, middle, and right  hepatic veins are patent with flow towards the IVC. The IVC is patent  with flow towards the heart.      Fluid: Small ascites between the diaphragm and the right lobe  measuring 1.8 x 2.1 x 4.7 cm.      Impression    Impression:   1.  Cirrhotic liver with retrograde flow in the left portal vein  suggestive of portal hypertension. No evidence for portal venous  thrombosis.  2.  Mild ascites.    I have personally reviewed the examination and initial interpretation  and I agree with the  findings.    MARTINEZ JVAED MD         SYSTEM ID:  [id number]     Medications       amiodarone  400 mg Oral BID    Followed by     [START ON 8/31/2021] amiodarone  200 mg Oral Daily     enoxaparin ANTICOAGULANT  40 mg Subcutaneous Q24H     furosemide  40 mg Oral Daily     lactulose  20 g Oral BID     piperacillin-tazobactam  3.375 g Intravenous Q6H     potassium & sodium phosphates  1 packet Oral or Feeding Tube BID     sodium chloride (PF)  3 mL Intravenous Q8H     spironolactone  100 mg Oral Daily     ursodiol  300 mg Oral BID

## 2021-08-24 NOTE — PLAN OF CARE
Major Shift Events:  A & O x4. Hearing aid in L ear. Pt on 2g Na diet. ROMA tube to gravity. Total output 750 mL. Replaced with 25g Albumin. Pt up to commode. Continent of urine and stool. Up with PT and OT, walked unit, A1 to commode and chair.  Angiogram scheduled for am tomorrow. Pt to be NPO at 0000.   Plan: Continue to monitor and notify MD of any changes. For vital signs and complete assessments, please see documentation flowsheets.      Problem: Adult Inpatient Plan of Care  Goal: Plan of Care Review  Outcome: Improving

## 2021-08-24 NOTE — PROGRESS NOTES
[facility]ULT[facility]     Patient:  Berta Nava   Date of birth 1965, Medical record number [id number]  Date of Visit:  08/24/2021  Date of Admission: 8/18/2021          Assessment and Recommendations:   RECOMMENDATION:  -- Continue pip/tazo 3.375g Q6H given ongoing clinical improvement and GI source most likely with no history of highly-resistant organisms   - If she remains afebrile with normalized leukocytosis, recommend discontinuing antibiotics tomorrow as she has no clear persistent source of infection     ASSESSMENT:  Berta Nava is a 55 year old female with PMHx significant for PCS/UC (on mesalamine) c/b hepatic cirrhosis, ascites, biliary strictures and choledocholithiasis, issues with recurrent cholangitis, h/o S mitis bacteremia and liver abscess (4/2021) who presented to the hospital in normal state of health for liver transplantation. Unfortunately, developed ventricular tachycardia and hemodynamic instability during procedure requiring defibrillation and admission to ICU. ID consulted to evaluate for septic component of shock.    WBC elevated on admission to 15, note that her WBC count has been largely stable in 11-18 range. UA 8/19 with trace blood but otherwise unremarkable. BCx 8/19 NGTD. CRP was 40. Post procedurally WBC spiked to 40s then trended to mid 30s. Lactate debbie to 4 with improvement to normal. COVID PCR negative. Peritoneal fluid abd Cx 8/19 NGTD. SCx 8/19 ET tube NGTD. MRSA nares sent 8/20. CT PE protocol without e/o PE, tree in bud nodularity of MINI and LLL and L middle ribs, attn to follow up, 5 mm pulm nodule in RUL.     Patient started on Zosyn+Vanc+fluconazole but switched to meropenem + micafungin+ vanco. De-escalated to vanco + babita -> babita -> pip/tazo. Without clear persistent source and good improvement, can plan to discontinue antibiotics tomorrow.    Patient has made very good improvement over the weekend, now extubated, off pressors,  and awake and alert. Source of ventricular arrhythmia remains uncertain. EP cardiology is consulting - they are planning for a coronary angiogram while she is inpatient. No clear evidence of infection. Tentative plan for ERCP once cardiology workup is complete.      ID will sign off at this time. Please contact me with any additional questions or concerns. Thanks you for involving us in Berta's care.    Eliecer Osborne MD   Infectious Diseases  Pager [phone]        Interval History   Patient is awake and alert this morning. Pleasant. Daughter is present. No new infectious symptoms. Paracentesis yesterday with 134 PMNs and negative gram stain. Cards planning a coronary angiogram to further investigate a cardiac source of her operative decompensation.      Past Infx Hx:   -S mitis SBP and BSI 4/2021 s/p prolonged IV abx for possible liver abscess (-6/11, then PO Augmentin through 6/16).   -5/21 &6/11/21 indeterminate quant gold          Review of Systems:   5pt ROS performed, see HPI.         Current Medications:       amiodarone  400 mg Oral BID    Followed by     [START ON 8/31/2021] amiodarone  200 mg Oral Daily     enoxaparin ANTICOAGULANT  40 mg Subcutaneous Q24H     furosemide  40 mg Oral Daily     lactulose  20 g Oral BID     piperacillin-tazobactam  3.375 g Intravenous Q6H     potassium & sodium phosphates  1 packet Oral or Feeding Tube BID     sodium chloride (PF)  3 mL Intravenous Q8H     spironolactone  100 mg Oral Daily     ursodiol  300 mg Oral BID            Allergies:     Allergies   Allergen Reactions     Diagnostic X-Ray Materials Hives     PATIENT HAD HIVE REACTION AFTER ADMINISTERING CT CONTRAST DYE.       Contrast Dye             Physical Exam:   Vitals were reviewed  Patient Vitals for the past 24 hrs:   BP Temp Temp src Pulse Resp SpO2 Weight   08/24/21 1000 -- -- -- 81 -- 98 % --   08/24/21 0900 -- -- -- 84 -- 98 % --   08/24/21 0800 97/57 98.5  F (36.9  C) Oral 79 17 97 % --   08/24/21  0600 -- -- -- 79 -- 97 % --   08/24/21 0500 -- -- -- 79 -- 97 % --   08/24/21 0400 94/60 98.4  F (36.9  C) Oral 82 16 97 % 68 kg (149 lb 14.6 oz)   08/24/21 0300 -- -- Oral 78 -- 98 % --   08/24/21 0100 -- -- -- 74 -- 99 % --   08/24/21 0000 105/68 97.7  F (36.5  C) Oral 75 16 99 % --   08/23/21 2300 -- -- -- 75 -- 100 % --   08/23/21 2200 103/62 -- -- 78 -- 98 % --   08/23/21 2000 108/75 97.5  F (36.4  C) Oral 81 16 100 % --   08/23/21 1800 103/70 -- -- 73 -- 100 % --   08/23/21 1700 -- -- -- 73 -- 99 % --   08/23/21 1600 -- -- -- 78 17 100 % --   08/23/21 1500 -- -- -- 69 -- 99 % --   08/23/21 1400 105/70 -- -- 73 -- 99 % --   08/23/21 1300 100/64 -- -- 74 -- 96 % --   08/23/21 1200 106/64 97.4  F (36.3  C) Oral 71 16 97 % --       Physical Examination:  Constitutional: Female patient seen lying in bed. Pleasant and conversant. Daughter present.  HEENT: NC/AT, MMM, scleral icterus present   GI: Distended but soft. No significant tenderness to palpation.  Skin: Warm and dry. No rashes or lesions on exposed surfaces. Somewhat jaundiced.  Musculoskeletal: Extremities grossly normal. No edema.  VAD: CVCs c/d/i.          Laboratory Data:     Inflammatory Markers    Recent Labs   Lab Test 08/19/21  0018 06/08/21  1602 06/01/21  0915 05/25/21  1455 05/21/21  1500 05/18/21  1010   CRP 40.0* 23.1* 16.1* 14.3* 16.1* 21.4*       Hematology Studies    Recent Labs   Lab Test 08/24/21  0339 08/23/21  1635 08/23/21  0323 08/22/21  1654 08/22/21  0430 08/21/21  0417 06/21/21  0938 06/12/21  1259 06/10/21  0702 06/09/21  1527 06/01/21  0915 05/12/21  0542 05/11/21  0724   WBC 11.3* 14.3* 11.8* 13.6* 17.7* 20.0*   < > 21.3* 22.8* 27.5* 11.5* 14.5* 18.3*   ANEU  --   --   --   --   --   --   --  18.5* 22.0* 25.9* 9.3* 11.3* 16.3*   AEOS  --   --   --   --   --   --   --  0.0 0.0 0.0 0.2 0.2 0.0   HGB 9.0* 10.5* 9.1* 8.9* 8.3* 8.4*  8.4*   < > 10.3* 9.8* 11.6* 11.3* 9.6* 9.7*   MCV 89 91 89 92 89 91   < > 95 94 96 93 94 92     201 163 160 197 269   < > 291 270 431 386 257 264    < > = values in this interval not displayed.       Metabolic Studies     Recent Labs   Lab Test 08/24/21  0339 08/23/21  1635 08/23/21  0323 08/22/21  1654 08/22/21  0430    135 136 138 136   POTASSIUM 3.7 3.5 3.4  3.4 3.5 3.7   CHLORIDE 108 106 108 108 107   CO2 21 20 19* 20 18*   BUN 37* 47* 50* 58* 58*   CR 0.58 0.61 0.69 0.93 1.23*   GFRESTIMATED >90 >90 >90 68 49*       Hepatic Studies    Recent Labs   Lab Test 08/24/21  0339 08/23/21  1635 08/23/21  0323 08/22/21  1654 08/22/21  0430 08/21/21 2027   BILITOTAL 4.2* 4.3* 3.1* 3.1* 2.9* 3.1*   ALKPHOS 307* 368* 247* 233* 200* 186*   ALBUMIN 3.4 3.4 3.0* 3.7 4.0 4.1   AST 52* 59* 42 41 43 44   ALT 34 39 31 30 27 25       Microbiology:  Culture   Date Value Ref Range Status   08/19/2021 No growth after 4 days  Preliminary   08/19/2021 1+ Normal arleen  Final   08/19/2021 No anaerobic organisms isolated after 4 days  Preliminary   08/19/2021 No growth after 4 days  Preliminary   08/19/2021 No Growth  Final   08/19/2021   Final    No Vancomycin Resistant Enterococcus species isolated   08/19/2021 No Growth  Final       Urine Studies    Recent Labs   Lab Test 08/19/21 2135 08/19/21  0513 06/10/21  0632 05/21/21  0730 04/29/21  1106   LEUKEST Negative Negative Negative Negative Negative   WBCU 2 1 1 4 15*       Vancomycin Levels    Recent Labs   Lab Test 08/20/21  1953 06/11/21  0835   VANCOMYCIN 12.5 16.8       Hepatitis B Testing   Recent Labs   Lab Test 08/18/21 2132   HBCAB Nonreactive   HEPBANG Nonreactive     Hepatitis C Testing     Hepatitis C Antibody   Date Value Ref Range Status   08/18/2021 Nonreactive Nonreactive Final     Respiratory Virus Testing    No results found for: RS, FLUAG    IMAGING    8/20 CXR   IMPRESSION:   1. Endotracheal tube now projects approximately 5.8 cm cephalad to the  caryn.  2. Decreased perihilar opacities, favoring atelectasis. No new focal  consolidative  opacities.    8/19 CT PE  IMPRESSION:   1. No evidence of pulmonary embolism.  Contrast mixing artifact in the  left lower lobe superior segment pulmonary artery. No evidence of  right heart strain.  2. Endotracheal tube abutting the caryn.  3. Atelectasis/collapse of the posterior aspects of the bilateral  upper and lower lobes.   4. Tree in bud nodularity involving the left upper, left lower, and  the left middle ribs. Possibilities include infectious and  inflammatory etiologies. Recommend follow-up until resolution.  5. 5 mm pulmonary nodule of the right upper lobe, if the patient is  considered high risk for lung cancer consider follow-up low-dose chest  CT in 12 months.

## 2021-08-24 NOTE — PROGRESS NOTES
[facility]    Progress Note - Cassie 4 Service        Date of Admission:  8/18/2021    Assessment & Plan             Bertaalfredo Nava is a 55 year old female with 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 She presented in stable condition from home for potential liver transplant. Patient was taken to the OR. Intraop, abdomen was opened, ascites was evacuated and falciform and left triangular ligament divided. At this time, patient developed vtach requiring 5-6 rounds of debrillation and the procedure was aborted. Consideration for starting ECMO, but patient converted out. Patient incisions closed. Taken to CT for concern for PE. CT showed no signs of PE. Initially admitted to SICU due to unstable hemodynamics on 3 pressors. Transferred to MICU 8/22 after coming of pressors. Transferred to floor 8/23.    # Hepatic Cirrhosis Secondary to PSC/UC  Patient being reevaluated for liver transplantation due to arrhythmias. Currently inactive on transplant list.  - ROMA drain x1, consider removing if tx surgery agrees  - Hepatology consulted, appreciate recs               -restart home diuretics               -diagnostic ascites studies               -Abx U/S with doppler   - Continue PTA ursodiol   - Lactulose 20 BID ordered, PRN senna and miralax    #. Intraoperative V tach requiring 4 rounds of defibrillation   #. Distributive vs hypovolemic shock, resolved   Patient had wnl stress ECHO pre-op with EF 60-65%. Intraoperative v tach requiring defibrillation, came to SICU on 3 pressors. TTE post op 8/19 with global and regional left ventricular function is normal with an EF of 60-65%. Off pressors since 8/20.  - MAP goal > 55  - Amiodarone PO taper per EP  - Cards consulted, appreciate recs               - Hepatology requesting risk stratification for repeat tx attempt    #. Sepsis  Unclear  source. Initially thought to possibly be cholangitis but less likely per discussion with transplant hepatology. Greatest concern for an intraabdominal source. OR, blood, and sputum cultures with NGTD. EBV IgG positive but IgM not reactive.   - ID consulted  - CTM Cx  - Ascitic fluid analysis as above  - Per ID, discontinued Meropenem and transitioned to Zosyn    #. Post Operative Abdominal Pain  - Oxycodone 5 mg q4 PRN    #. Acute blood loss anemia    S/p transfusion: 3 units intraoperative   - Goals: Hb > 8, Fibrinogen >200, INR < 2, plts > 50  - Continue to trend Hb     #. Post operative respiratory failure, resolved   - Extubated, on RA    #. OGNZALO (BL Creat 0.7), resolved   - continue to monitor kidney function and urine output     #. Stress hyperglycemia  In setting of steroid administration  - sliding scale insulin ordered     #. Deconditioning  - Will plan to consult PT and OT     Diet: Room Service  Advance Diet as Tolerated: Regular Diet Adult    DVT Prophylaxis: Enoxaparin (Lovenox) SQ  Jurado Catheter: Not present  Fluids: PO  Central Lines: None  Code Status: Full Code      Disposition Plan   Expected discharge: >3 days recommended to TBD once transplant evaluation completed.     The patient's care was discussed with the Attending Physician, Dr. Patel.    Augustine Pena MD  [address]  Securely message with the Vocera Web Console (learn more here)  Text page via [facility] Paging/Directory  Please see sign in/sign out for up to date coverage information    Clinically Significant Risk Factors Present on Admission                ______________________________________________________________________    Interval History   See MICU note from same day    Data reviewed today: I reviewed all medications, new labs and imaging results over the last 24 hours.    Physical Exam   Vital Signs: Temp: 97.4  F (36.3  C) Temp src: Oral BP: 103/70 Pulse: 73   Resp: 17  SpO2: 100 % O2 Device: None (Room air)    Weight: 144 lbs 6.42 oz  General: awake, sitting up  HEENT:NC/AT   Pulm/Resp: no increased work of breathing, symmetric chest expansion   CV: RRR, S1. S2 present, no murmur appreciated   Abdomen: Soft, mildly distended, non-tender, no rebound tenderness or guarding, no masses ROMA drain w/ serosanguinous fluids.   Incisions/Skin: no jaundice   MSK/Extremities: trace peripheral edema, moving all extremities, calves soft, extremities well perfused    Data   Recent Labs   Lab 08/23/21  1640 08/23/21  1635 08/23/21  1213 08/23/21  0323 08/22/21  1654 08/22/21  0430 08/20/21  1707 08/20/21  1533 08/20/21  0859 08/20/21  0849 08/20/21  0419 08/20/21  0418   WBC  --  14.3*  --  11.8* 13.6* 17.7*   < >  --   --   --   --   --    HGB  --  10.5*  --  9.1* 8.9* 8.3*   < > 8.3*   < > 9.4*  --   --    MCV  --  91  --  89 92 89   < >  --   --   --   --   --    PLT  --  201  --  163 160 197   < >  --   --   --   --   --    INR  --   --   --  1.69* 1.66* 1.63*  --   --   --   --   --   --    NA  --  135  --  136 138 136   < >  --   --  135  --  133   POTASSIUM  --  3.5  --  3.4  3.4 3.5 3.7   < >  --   --  4.0  --  4.0   CHLORIDE  --  106  --  108 108 107   < >  --   --  106  --  104   CO2  --  20  --  19* 20 18*   < >  --   --  18*  --  14*   BUN  --  47*  --  50* 58* 58*   < >  --   --  25  --  24   CR  --  0.61  --  0.69 0.93 1.23*   < >  --   --  0.82  --  0.78   ANIONGAP  --  9  --  9 10 11   < >  --   --  11  --  15*   MARIELENA  --  9.4  --  8.9 9.5 9.2   < >  --   --  8.7  --  9.4   * 113* 86 83  84 110* 122*   < >  --   --  176*   < > 199*   ALBUMIN  --  3.4  --  3.0* 3.7 4.0   < > 4.2   < > 3.9  --   --    PROTTOTAL  --  6.0*  --  5.4* 6.1* 6.2*   < > 6.2*   < > 6.7*  --   --    BILITOTAL  --  4.3*  --  3.1* 3.1* 2.9*   < > 4.5*   < > 5.1*  --   --    ALKPHOS  --  368*  --  247* 233* 200*   < > 157*   < > 196*  --   --    ALT  --  39  --  31 30 27   < > 24   < > 28  --   --     AST  --  59*  --  42 41 43   < > 52*   < > 59*  --   --    TROPONIN  --   --   --   --   --   --   --  0.242*  --  0.311*  --  0.325*    < > = values in this interval not displayed.     Recent Results (from the past 24 hour(s))   US Abd Hepatic & Portal Vasculature Cmpl    Narrative    EXAMINATION: US ABD HEPATIC & PORTAL VASCULATURE  8/23/2021 3:37  PM     COMPARISON: Abdominal ultrasound with Doppler, 8/19/2021.    HISTORY: Rule out PVT      TECHNIQUE: The abdomen was scanned in standard fashion with  specialized ultrasound transducer(s) using both gray-scale, color  Doppler, and spectral flow techniques.    Findings:  Only liver Doppler was performed. Gray-scale evaluation of the  abdominal organs was not done.  Extrahepatic portal vein flow is antegrade at 15 cm/s. The main portal  vein measures 0.9 cm.  Right portal vein flow is antegrade, measuring 16 cm/s.  Left portal vein flow is retrograde, measuring 14 cm.    Flow in the hepatic artery is towards the liver and:  59 cm/s peak systolic  0.82 resistive index.     The splenic vein is not visualized.  The left, middle, and right  hepatic veins are patent with flow towards the IVC. The IVC is patent  with flow towards the heart.      Fluid: Small ascites between the diaphragm and the right lobe  measuring 1.8 x 2.1 x 4.7 cm.      Impression    Impression:   1.  Cirrhotic liver with retrograde flow in the left portal vein  suggestive of portal hypertension. No evidence for portal venous  thrombosis.  2.  Mild ascites.    I have personally reviewed the examination and initial interpretation  and I agree with the findings.    MARTINEZ JAVED MD         SYSTEM ID:  [id number]     Medications       amiodarone  400 mg Oral BID    Followed by     [START ON 8/31/2021] amiodarone  200 mg Oral Daily     enoxaparin ANTICOAGULANT  40 mg Subcutaneous Q24H     furosemide  40 mg Oral Daily     insulin aspart  1-6 Units Subcutaneous Q4H     lactulose  20 g Oral BID      piperacillin-tazobactam  3.375 g Intravenous Q6H     sodium chloride (PF)  3 mL Intravenous Q8H     [START ON 8/24/2021] spironolactone  100 mg Oral Daily     ursodiol  300 mg Oral BID

## 2021-08-24 NOTE — PLAN OF CARE
Major Shift Events:  Neuros intact, occasionally forgetful. SR in the 80s. LS fine crackles on RA. ROMA with minimal output ex for dump of 500-transplant notified and assessed patient, no further intervention, continue to monitor. Total 1L albumin given per protocol. Straight cath x1, now voiding spontaneously. K and phos replace.     Plan: Floor orders, transfer when bed available.      For vital signs and complete assessments, please see documentation flowsheets.

## 2021-08-24 NOTE — SIGNIFICANT EVENT
SPIRITUAL HEALTH SERVICES Significant Event  Religious Sacrament of ANOINTING  [facility] 4A    Pt anointed by Father Cris per request of family.    Kevan Alfred (Bill). Sunny, [facility]  Staff   Pager [phone]

## 2021-08-25 ENCOUNTER — APPOINTMENT (OUTPATIENT)
Dept: PHYSICAL THERAPY | Facility: CLINIC | Age: 56
DRG: 420 | End: 2021-08-25
Attending: TRANSPLANT SURGERY
Payer: COMMERCIAL

## 2021-08-25 ENCOUNTER — APPOINTMENT (OUTPATIENT)
Dept: OCCUPATIONAL THERAPY | Facility: CLINIC | Age: 56
DRG: 420 | End: 2021-08-25
Attending: TRANSPLANT SURGERY
Payer: COMMERCIAL

## 2021-08-25 LAB
ALBUMIN SERPL-MCNC: 3.3 G/DL (ref 3.4–5)
ALP SERPL-CCNC: 240 U/L (ref 40–150)
ALT SERPL W P-5'-P-CCNC: 31 U/L (ref 0–50)
ANION GAP SERPL CALCULATED.3IONS-SCNC: 9 MMOL/L (ref 3–14)
AST SERPL W P-5'-P-CCNC: 43 U/L (ref 0–45)
ATRIAL RATE - MUSE: 71 BPM
BILIRUB DIRECT SERPL-MCNC: 3.2 MG/DL (ref 0–0.2)
BILIRUB SERPL-MCNC: 4.7 MG/DL (ref 0.2–1.3)
BUN SERPL-MCNC: 27 MG/DL (ref 7–30)
CALCIUM SERPL-MCNC: 8.2 MG/DL (ref 8.5–10.1)
CHLORIDE BLD-SCNC: 109 MMOL/L (ref 94–109)
CO2 SERPL-SCNC: 20 MMOL/L (ref 20–32)
CREAT SERPL-MCNC: 0.5 MG/DL (ref 0.52–1.04)
DIASTOLIC BLOOD PRESSURE - MUSE: NORMAL MMHG
ERYTHROCYTE [DISTWIDTH] IN BLOOD BY AUTOMATED COUNT: 15.9 % (ref 10–15)
GFR SERPL CREATININE-BSD FRML MDRD: >90 ML/MIN/1.73M2
GLUCOSE BLD-MCNC: 117 MG/DL (ref 70–99)
GLUCOSE BLDC GLUCOMTR-MCNC: 111 MG/DL (ref 70–99)
HCT VFR BLD AUTO: 26.7 % (ref 35–47)
HGB BLD-MCNC: 8.7 G/DL (ref 11.7–15.7)
INR PPP: 2.03 (ref 0.85–1.15)
INTERPRETATION ECG - MUSE: NORMAL
MAGNESIUM SERPL-MCNC: 1.7 MG/DL (ref 1.6–2.3)
MCH RBC QN AUTO: 29.5 PG (ref 26.5–33)
MCHC RBC AUTO-ENTMCNC: 32.6 G/DL (ref 31.5–36.5)
MCV RBC AUTO: 91 FL (ref 78–100)
P AXIS - MUSE: 30 DEGREES
PHOSPHATE SERPL-MCNC: 3.3 MG/DL (ref 2.5–4.5)
PLATELET # BLD AUTO: 121 10E3/UL (ref 150–450)
POTASSIUM BLD-SCNC: 3.4 MMOL/L (ref 3.4–5.3)
PR INTERVAL - MUSE: 162 MS
PROT SERPL-MCNC: 5.4 G/DL (ref 6.8–8.8)
QRS DURATION - MUSE: 88 MS
QT - MUSE: 502 MS
QTC - MUSE: 545 MS
R AXIS - MUSE: 60 DEGREES
RBC # BLD AUTO: 2.95 10E6/UL (ref 3.8–5.2)
SODIUM SERPL-SCNC: 138 MMOL/L (ref 133–144)
SYSTOLIC BLOOD PRESSURE - MUSE: NORMAL MMHG
T AXIS - MUSE: 19 DEGREES
VENTRICULAR RATE- MUSE: 71 BPM
WBC # BLD AUTO: 8.1 10E3/UL (ref 4–11)

## 2021-08-25 PROCEDURE — 99153 MOD SED SAME PHYS/QHP EA: CPT | Performed by: INTERNAL MEDICINE

## 2021-08-25 PROCEDURE — 250N000011 HC RX IP 250 OP 636: Performed by: INTERNAL MEDICINE

## 2021-08-25 PROCEDURE — 97530 THERAPEUTIC ACTIVITIES: CPT | Mod: GP

## 2021-08-25 PROCEDURE — 250N000013 HC RX MED GY IP 250 OP 250 PS 637: Performed by: TRANSPLANT SURGERY

## 2021-08-25 PROCEDURE — 85027 COMPLETE CBC AUTOMATED: CPT | Performed by: STUDENT IN AN ORGANIZED HEALTH CARE EDUCATION/TRAINING PROGRAM

## 2021-08-25 PROCEDURE — B2111ZZ FLUOROSCOPY OF MULTIPLE CORONARY ARTERIES USING LOW OSMOLAR CONTRAST: ICD-10-PCS | Performed by: INTERNAL MEDICINE

## 2021-08-25 PROCEDURE — 250N000011 HC RX IP 250 OP 636: Performed by: STUDENT IN AN ORGANIZED HEALTH CARE EDUCATION/TRAINING PROGRAM

## 2021-08-25 PROCEDURE — 99152 MOD SED SAME PHYS/QHP 5/>YRS: CPT | Performed by: INTERNAL MEDICINE

## 2021-08-25 PROCEDURE — 84100 ASSAY OF PHOSPHORUS: CPT | Performed by: STUDENT IN AN ORGANIZED HEALTH CARE EDUCATION/TRAINING PROGRAM

## 2021-08-25 PROCEDURE — 272N000001 HC OR GENERAL SUPPLY STERILE: Performed by: INTERNAL MEDICINE

## 2021-08-25 PROCEDURE — 82947 ASSAY GLUCOSE BLOOD QUANT: CPT | Performed by: STUDENT IN AN ORGANIZED HEALTH CARE EDUCATION/TRAINING PROGRAM

## 2021-08-25 PROCEDURE — 250N000012 HC RX MED GY IP 250 OP 636 PS 637: Performed by: STUDENT IN AN ORGANIZED HEALTH CARE EDUCATION/TRAINING PROGRAM

## 2021-08-25 PROCEDURE — 250N000013 HC RX MED GY IP 250 OP 250 PS 637: Performed by: INTERNAL MEDICINE

## 2021-08-25 PROCEDURE — 97535 SELF CARE MNGMENT TRAINING: CPT | Mod: GO

## 2021-08-25 PROCEDURE — 82248 BILIRUBIN DIRECT: CPT | Performed by: STUDENT IN AN ORGANIZED HEALTH CARE EDUCATION/TRAINING PROGRAM

## 2021-08-25 PROCEDURE — 258N000003 HC RX IP 258 OP 636: Performed by: INTERNAL MEDICINE

## 2021-08-25 PROCEDURE — 120N000002 HC R&B MED SURG/OB UMMC

## 2021-08-25 PROCEDURE — 250N000011 HC RX IP 250 OP 636

## 2021-08-25 PROCEDURE — 83735 ASSAY OF MAGNESIUM: CPT | Performed by: STUDENT IN AN ORGANIZED HEALTH CARE EDUCATION/TRAINING PROGRAM

## 2021-08-25 PROCEDURE — 258N000003 HC RX IP 258 OP 636: Performed by: STUDENT IN AN ORGANIZED HEALTH CARE EDUCATION/TRAINING PROGRAM

## 2021-08-25 PROCEDURE — 250N000013 HC RX MED GY IP 250 OP 250 PS 637: Performed by: NURSE PRACTITIONER

## 2021-08-25 PROCEDURE — 85610 PROTHROMBIN TIME: CPT | Performed by: STUDENT IN AN ORGANIZED HEALTH CARE EDUCATION/TRAINING PROGRAM

## 2021-08-25 PROCEDURE — 99207 PR CDG-MDM COMPONENT: MEETS MODERATE - DOWN CODED: CPT | Performed by: STUDENT IN AN ORGANIZED HEALTH CARE EDUCATION/TRAINING PROGRAM

## 2021-08-25 PROCEDURE — 93454 CORONARY ARTERY ANGIO S&I: CPT | Performed by: INTERNAL MEDICINE

## 2021-08-25 PROCEDURE — C1769 GUIDE WIRE: HCPCS | Performed by: INTERNAL MEDICINE

## 2021-08-25 PROCEDURE — C1894 INTRO/SHEATH, NON-LASER: HCPCS | Performed by: INTERNAL MEDICINE

## 2021-08-25 PROCEDURE — P9041 ALBUMIN (HUMAN),5%, 50ML: HCPCS

## 2021-08-25 PROCEDURE — 272N000002 HC OR SUPPLY OTHER OPNP: Performed by: INTERNAL MEDICINE

## 2021-08-25 PROCEDURE — 99024 POSTOP FOLLOW-UP VISIT: CPT | Performed by: TRANSPLANT SURGERY

## 2021-08-25 PROCEDURE — 99232 SBSQ HOSP IP/OBS MODERATE 35: CPT | Mod: GC | Performed by: STUDENT IN AN ORGANIZED HEALTH CARE EDUCATION/TRAINING PROGRAM

## 2021-08-25 PROCEDURE — 250N000013 HC RX MED GY IP 250 OP 250 PS 637: Performed by: STUDENT IN AN ORGANIZED HEALTH CARE EDUCATION/TRAINING PROGRAM

## 2021-08-25 PROCEDURE — 250N000009 HC RX 250: Performed by: INTERNAL MEDICINE

## 2021-08-25 RX ORDER — HEPARIN SODIUM,PORCINE 10 UNIT/ML
5-20 VIAL (ML) INTRAVENOUS
Status: DISCONTINUED | OUTPATIENT
Start: 2021-08-25 | End: 2021-09-09 | Stop reason: HOSPADM

## 2021-08-25 RX ORDER — ATROPINE SULFATE 0.1 MG/ML
0.5 INJECTION INTRAVENOUS
Status: ACTIVE | OUTPATIENT
Start: 2021-08-25 | End: 2021-08-26

## 2021-08-25 RX ORDER — NITROGLYCERIN 5 MG/ML
VIAL (ML) INTRAVENOUS
Status: DISCONTINUED | OUTPATIENT
Start: 2021-08-25 | End: 2021-08-25 | Stop reason: HOSPADM

## 2021-08-25 RX ORDER — IOPAMIDOL 755 MG/ML
INJECTION, SOLUTION INTRAVASCULAR
Status: DISCONTINUED | OUTPATIENT
Start: 2021-08-25 | End: 2021-08-25 | Stop reason: HOSPADM

## 2021-08-25 RX ORDER — NALOXONE HYDROCHLORIDE 0.4 MG/ML
0.4 INJECTION, SOLUTION INTRAMUSCULAR; INTRAVENOUS; SUBCUTANEOUS
Status: DISCONTINUED | OUTPATIENT
Start: 2021-08-25 | End: 2021-08-25

## 2021-08-25 RX ORDER — SODIUM CHLORIDE 9 MG/ML
75 INJECTION, SOLUTION INTRAVENOUS CONTINUOUS
Status: ACTIVE | OUTPATIENT
Start: 2021-08-25 | End: 2021-08-25

## 2021-08-25 RX ORDER — OXYCODONE HYDROCHLORIDE 5 MG/1
5 TABLET ORAL EVERY 4 HOURS PRN
Status: DISCONTINUED | OUTPATIENT
Start: 2021-08-25 | End: 2021-09-09 | Stop reason: HOSPADM

## 2021-08-25 RX ORDER — FLUMAZENIL 0.1 MG/ML
0.2 INJECTION, SOLUTION INTRAVENOUS
Status: ACTIVE | OUTPATIENT
Start: 2021-08-25 | End: 2021-08-26

## 2021-08-25 RX ORDER — FENTANYL CITRATE 50 UG/ML
25 INJECTION, SOLUTION INTRAMUSCULAR; INTRAVENOUS
Status: DISCONTINUED | OUTPATIENT
Start: 2021-08-25 | End: 2021-08-26

## 2021-08-25 RX ORDER — HEPARIN SODIUM,PORCINE 10 UNIT/ML
5-20 VIAL (ML) INTRAVENOUS EVERY 24 HOURS
Status: DISCONTINUED | OUTPATIENT
Start: 2021-08-25 | End: 2021-09-09 | Stop reason: HOSPADM

## 2021-08-25 RX ORDER — POTASSIUM CHLORIDE 750 MG/1
40 TABLET, EXTENDED RELEASE ORAL ONCE
Status: COMPLETED | OUTPATIENT
Start: 2021-08-25 | End: 2021-08-25

## 2021-08-25 RX ORDER — FENTANYL CITRATE 50 UG/ML
INJECTION, SOLUTION INTRAMUSCULAR; INTRAVENOUS
Status: DISCONTINUED | OUTPATIENT
Start: 2021-08-25 | End: 2021-08-25 | Stop reason: HOSPADM

## 2021-08-25 RX ORDER — NALOXONE HYDROCHLORIDE 0.4 MG/ML
0.2 INJECTION, SOLUTION INTRAMUSCULAR; INTRAVENOUS; SUBCUTANEOUS
Status: DISCONTINUED | OUTPATIENT
Start: 2021-08-25 | End: 2021-08-25

## 2021-08-25 RX ORDER — MAGNESIUM OXIDE 400 MG/1
400 TABLET ORAL 2 TIMES DAILY
Status: COMPLETED | OUTPATIENT
Start: 2021-08-25 | End: 2021-08-26

## 2021-08-25 RX ORDER — OXYCODONE HYDROCHLORIDE 10 MG/1
10 TABLET ORAL EVERY 4 HOURS PRN
Status: DISCONTINUED | OUTPATIENT
Start: 2021-08-25 | End: 2021-09-09 | Stop reason: HOSPADM

## 2021-08-25 RX ADMIN — Medication 400 MG: at 20:39

## 2021-08-25 RX ADMIN — URSODIOL 300 MG: 300 CAPSULE ORAL at 07:41

## 2021-08-25 RX ADMIN — AMIODARONE HYDROCHLORIDE 400 MG: 200 TABLET ORAL at 20:39

## 2021-08-25 RX ADMIN — AMIODARONE HYDROCHLORIDE 400 MG: 200 TABLET ORAL at 07:41

## 2021-08-25 RX ADMIN — URSODIOL 300 MG: 300 CAPSULE ORAL at 20:40

## 2021-08-25 RX ADMIN — Medication 5 ML: at 21:46

## 2021-08-25 RX ADMIN — POTASSIUM CHLORIDE 40 MEQ: 750 TABLET, EXTENDED RELEASE ORAL at 06:20

## 2021-08-25 RX ADMIN — Medication 5 ML: at 21:50

## 2021-08-25 RX ADMIN — DIPHENHYDRAMINE HYDROCHLORIDE 25 MG: 50 INJECTION INTRAMUSCULAR; INTRAVENOUS at 14:49

## 2021-08-25 RX ADMIN — Medication 400 MG: at 07:41

## 2021-08-25 RX ADMIN — PREDNISONE 50 MG: 50 TABLET ORAL at 06:20

## 2021-08-25 RX ADMIN — ALBUMIN HUMAN 12.5 G: 0.05 INJECTION, SOLUTION INTRAVENOUS at 06:21

## 2021-08-25 RX ADMIN — Medication 5 ML: at 21:48

## 2021-08-25 RX ADMIN — SODIUM CHLORIDE: 9 INJECTION, SOLUTION INTRAVENOUS at 08:58

## 2021-08-25 RX ADMIN — ALBUMIN HUMAN 25 G: 0.05 INJECTION, SOLUTION INTRAVENOUS at 10:04

## 2021-08-25 RX ADMIN — PIPERACILLIN AND TAZOBACTAM 3.38 G: 3; .375 INJECTION, POWDER, FOR SOLUTION INTRAVENOUS at 05:11

## 2021-08-25 RX ADMIN — OXYCODONE HYDROCHLORIDE 5 MG: 5 TABLET ORAL at 22:42

## 2021-08-25 RX ADMIN — ALBUMIN HUMAN 25 G: 0.05 INJECTION, SOLUTION INTRAVENOUS at 12:50

## 2021-08-25 RX ADMIN — LACTULOSE 20 G: 10 POWDER, FOR SOLUTION ORAL at 20:40

## 2021-08-25 RX ADMIN — ALBUMIN HUMAN 25 G: 0.05 INJECTION, SOLUTION INTRAVENOUS at 07:57

## 2021-08-25 ASSESSMENT — ACTIVITIES OF DAILY LIVING (ADL)
ADLS_ACUITY_SCORE: 16

## 2021-08-25 ASSESSMENT — MIFFLIN-ST. JEOR: SCORE: 1269.88

## 2021-08-25 NOTE — PROGRESS NOTES
Major Shift Events:  Neuro exam intact. SR in the 60s-80s. LS clear but diminished at bases. >500 ml drain output in 1 hour on 2 separate occurences. MD notified. Replaced albumin for drain output.  Plan: Transfer to 7D.   For vital signs and complete assessments, please see documentation flowsheets.

## 2021-08-25 NOTE — PLAN OF CARE
"Pt tx from unit 4A to unit 7D just before 3 pm.    Settled pt to room. VSS-->/59 (BP Location: Right arm)   Pulse 62   Temp (!) 95.4  F (35.2  C) (Oral)   Resp 16   Ht 1.651 m (5' 5\")   Wt 68.4 kg (150 lb 12.7 oz)   SpO2 100%   BMI 25.09 kg/m      Pt denies pain.    Writer gave IV benadryl x 1 once transport showed up to take pt to cath lab--pt is allergic to contrast. Just as pt was about to leave unit, cath lab called and stated not to send pt as need to find pt a bed to recover in post cath lab.    Then around 1540 cath lab called again and said they would send for pt not. Writer asked what to do about benadryl; cath lab stated it was okay that benadryl was admin previously and that pt doesn't need any additional dosages.    Leela Crespo RN on 8/25/2021 at 3:46 PM  "

## 2021-08-25 NOTE — PROGRESS NOTES
Transplant Surgery  Inpatient Daily Progress Note  08/25/2021    Assessment & Plan: Berta Nava is a 55 year old female with PMH of PSC/UC  hepatic cirrhosis complicated by ascites, biliary strictures and choledocholithiasis, recurrent cholangitis. OR 8/19 for potential transplant but was aborted due to episode of VT requiring cardioversion and hypotension requiring pressors. Patient transferred to SICU for hemodynamic monitoring. Cardiology, ID, and Panc/biliary team consulted.     Neuro:  Acute post op pain: Continue PRN oxycodone.   Cardiorespiratory:   Hypotension secondary to shock: Off pressors since 8/20. Stable   Ventricular tachycardia: EP Cards consulted. On amiodarone, switched to oral. K > 4 and Mg > 2. Telemetry. Per cardiology, likely not primarily a cardiac issue, but rather sepsis, electrolyte abnormalities. Troponin trended down.wnl stress ECHO pre-op with EF 60-65%. Will undergo angio today, will re-discuss transplant candidacy pending results.  Post operative respiratory failure: Resolved. On RA  Hematology:   Anemia of chronic disease/ABL : Hgb ~9, stable. Received 3 units PRBC intraop   GI/Nutrition: CLD.  PSC with cirrhosis/UC: Ascites. Surgery drain to gravity, output 1.3 L/24 hrs with an increased 500cc in the past hour. PTA ursodiol not started.   Hepatology following. Abd US 8/23, cirrhosis and ascites with portal HTN , no PVT.   Ascites: Large volume output from drain, stop albumin replacements, leave drain to gravity. Plan to remove when <500cc/day   Fluid/Electrolytes:   GONZALO: Cr 0.5 (down from 1.2), resolved. Avoid nephrotoxins.   Endocrine:   Stress/steroid hyperglycemia: managed per primary team.   :  Strict I & O monitoring in a critically ill patient  Infectious disease: Afebrile, Leukocytosis, resolved, WBC 8.   Septic shock: Consulted ID. Suspect GI source. Fluid gram stain negative. Fluid cx and blood cx in process, no growth.  Meropenem8/20-8/23, transitioned to Zosyn  "8/23-per ID.     Prophylaxis: DVT (heparin SQ), GI (PPI)  Activity: PT/OT  Disposition: SICU, transfer to Floor, Medicine primary    Medical Decision Making: High  Subsequent visit 83362 (high level decision making)    [place]/Fellow/Resident Provider: Nini Zhou, NP [id number]    Faculty: Nasir Rojas MD PhD  __________________________________________________________________  Transplant History: Admitted 8/18/2021 for liver transplant, which was aborted due to instability of patient intraop.  N/A, Postoperative day:      Interval History:  No acute events overnight. Seen resting in bed. Tearful about failed transplant. Anxious to get results of angiogram. Reports incisional pain much improved.     ROS:   A 10-point review of systems was negative except as noted above.    Meds:    amiodarone  400 mg Oral BID    Followed by     [START ON 8/31/2021] amiodarone  200 mg Oral Daily     diphenhydrAMINE  25 mg Intravenous Once     enoxaparin ANTICOAGULANT  40 mg Subcutaneous Q24H     [Held by provider] furosemide  40 mg Oral Daily     lactulose  20 g Oral BID     magnesium oxide  400 mg Oral BID     sodium chloride (PF)  3 mL Intravenous Q8H     [Held by provider] spironolactone  100 mg Oral Daily     ursodiol  300 mg Oral BID       Physical Exam:     Admit Weight: 59.5 kg (131 lb 2.8 oz)    Current vitals:   /70 (BP Location: Right arm)   Pulse 68   Temp 97.4  F (36.3  C) (Oral)   Resp 16   Ht 1.651 m (5' 5\")   Wt 68.4 kg (150 lb 12.7 oz)   SpO2 98%   BMI 25.09 kg/m        Vital sign ranges:    Temp:  [97.4  F (36.3  C)-98.5  F (36.9  C)] 97.4  F (36.3  C)  Pulse:  [68-84] 68  Resp:  [14-16] 16  BP: ()/(53-70) 114/70  SpO2:  [92 %-99 %] 98 %  Patient Vitals for the past 24 hrs:   BP Temp Temp src Pulse Resp SpO2 Weight   08/25/21 1200 114/70 97.4  F (36.3  C) Oral 68 16 98 % --   08/25/21 1100 -- -- -- 71 -- 99 % --   08/25/21 1000 -- -- -- 69 -- 99 % --   08/25/21 0900 -- -- -- 68 -- 98 % --   08/25/21 " 0800 96/65 98.1  F (36.7  C) Oral 68 14 99 % --   08/25/21 0700 -- -- -- 71 -- 96 % --   08/25/21 0600 -- -- -- 68 -- 92 % --   08/25/21 0500 -- -- -- 68 -- 94 % --   08/25/21 0400 97/62 97.6  F (36.4  C) Oral 73 16 94 % 68.4 kg (150 lb 12.7 oz)   08/25/21 0300 -- -- -- 82 -- -- --   08/25/21 0200 -- -- -- 74 -- 97 % --   08/25/21 0100 -- -- -- 74 -- 96 % --   08/25/21 0000 100/64 98  F (36.7  C) Oral 78 14 98 % --   08/24/21 2300 -- -- -- 75 -- 97 % --   08/24/21 2200 -- -- -- 73 -- 96 % --   08/24/21 2100 -- -- -- 77 -- 97 % --   08/24/21 2000 93/53 97.7  F (36.5  C) Oral 77 16 98 % --   08/24/21 1900 -- -- -- 81 -- 99 % --   08/24/21 1800 -- -- -- 81 -- 98 % --   08/24/21 1700 -- -- -- 84 -- 99 % --   08/24/21 1600 101/58 98.5  F (36.9  C) Oral 80 16 99 % --   08/24/21 1500 -- -- -- 82 -- 99 % --   08/24/21 1400 -- -- -- 81 -- 99 % --     General Appearance: NAD   Skin: no rash  Heart: NSR  Lungs: unlabored on RA  Abdomen:rounded, +ascites, Incision closed with staples, open to air. ROMA with serous output.  : voiding.  Extremities: +edema  Neurologic: awake, alert, oriented    Data:   CMP  Recent Labs   Lab 08/25/21  0850 08/25/21  0352 08/24/21  0339 08/19/21  1424 08/19/21  1316 08/19/21  1222 08/19/21  1222 08/18/21  2132   NA  --  138 136  --  133   < > 130* 126*   POTASSIUM  --  3.4 3.7  --  3.5   < > 3.9 3.9   CHLORIDE  --  109 108  --   --    < >  --  98   CO2  --  20 21  --   --    < >  --  20   * 117* 83   < > 128*   < > 122* 100*   BUN  --  27 37*  --   --    < >  --  28   CR  --  0.50* 0.58  --   --    < >  --  0.65   GFRESTIMATED  --  >90 >90  --   --    < >  --  >90   MARIELENA  --  8.2* 9.1  --   --    < >  --  8.6   ICAW  --   --   --   --  4.6  --  4.7  --    MAG  --  1.7 2.1  --   --    < >  --  2.0   PHOS  --  3.3 2.5  --   --    < >  --  3.7   AMYLASE  --   --   --   --   --   --   --  68   ALBUMIN  --  3.3* 3.4  --   --    < >  --  2.0*   BILITOTAL  --  4.7* 4.2*  --   --    < >  --  3.6*    ALKPHOS  --  240* 307*  --   --    < >  --  400*   AST  --  43 52*  --   --    < >  --  66*   ALT  --  31 34  --   --    < >  --  36    < > = values in this interval not displayed.     CBC  Recent Labs   Lab 08/25/21 0352 08/24/21 0339   HGB 8.7* 9.0*   WBC 8.1 11.3*   * 160     COAGS  Recent Labs   Lab 08/25/21 0352 08/24/21 0339 08/18/21 2132   INR 2.03* 1.81* 1.45*   PTT  --   --  30      Urinalysis  Recent Labs   Lab Test 08/19/21  2135 08/19/21  0513 05/21/21  0730   COLOR Yellow Dark Yellow* Dark Yellow   APPEARANCE Slightly Cloudy* Clear Clear   URINEGLC Negative Negative Negative   URINEBILI Small* Small* Negative   URINEKETONE Negative Negative Negative   SG 1.025 1.019 1.031   UBLD Trace* Negative Negative   URINEPH 5.0 5.5 6.0   PROTEIN 10 * Negative 20*   NITRITE Negative Negative Negative   LEUKEST Negative Negative Negative   RBCU 6* <1 1   WBCU 2 1 4   UTPG  --   --  0.26*

## 2021-08-25 NOTE — PROGRESS NOTES
Major Shift Events:  Neuros intact. SR in the 80s. LS clear/diminished on RA. ROMA with minimal output ex for dump of 620-MD notified, continue to monitor--day RN will replace with albumin. K and Mg replaced. Loose BM x3. Pre-medicated for angio scheduled for today.      Plan: Floor orders, transfer when bed available.        For vital signs and complete assessments, please see documentation flowsheets.

## 2021-08-25 NOTE — PROGRESS NOTES
D/I/A: Pt roomed on 3C in bay 32.  Arrived via litter and accompanied by PACU nurse on monitor.  VSSA.  Rhythm upon arrival SR on monitor.  Denies pain or sob.  Reviewed activity restrictions and when to notify RN, ie-changes to breathing or increased chest pressure or chest pain.  CCL access:  left radial.  P: Continue to monitor status.  Discharge to home once meeting criteria.

## 2021-08-25 NOTE — CONSULTS
Care Management Initial Consult    General Information  Assessment completed with: Patient, Family,    Type of CM/SW Visit: Initial Assessment    Primary Care Provider verified and updated as needed: Yes   Readmission within the last 30 days: current reason for admission unrelated to previous admission   Return Category: Planned Surgery     Advance Care Planning: Advance Care Planning Reviewed: verified with patient          Communication Assessment  Patient's communication style: spoken language (English or Bilingual)    Hearing Difficulty or Deaf: yes   Wear Glasses or Blind: yes    Cognitive  Cognitive/Neuro/Behavioral: WDL  Level of Consciousness: alert  Arousal Level: opens eyes spontaneously  Orientation: oriented x 4  Mood/Behavior: calm, cooperative  Best Language: 0 - No aphasia  Speech: slow, clear    Living Environment:   People in home: spouse  Demar,   Current living Arrangements: house      Able to return to prior arrangements: yes       Family/Social Support:  Care provided by: self, spouse/significant other  Provides care for: no one  Marital Status:     Demar       Description of Support System: Supportive    Support Assessment: Adequate family and caregiver support, Adequate social supports    Current Resources:   Patient receiving home care services: No     Community Resources: None  Equipment currently used at home: none  Supplies currently used at home: None    Employment/Financial:  Employment Status: employed full-time (works from home)        Financial Concerns: No concerns identified   Referral to Financial Counselor: No       Lifestyle & Psychosocial Needs:  Social Determinants of Health     Tobacco Use: Low Risk      Smoking Tobacco Use: Never Smoker     Smokeless Tobacco Use: Never Used   Alcohol Use:      Frequency of Alcohol Consumption:      Average Number of Drinks:      Frequency of Binge Drinking:    Financial Resource Strain:      Difficulty of Paying Living  Expenses:    Food Insecurity:      Worried About Running Out of Food in the Last Year:      Ran Out of Food in the Last Year:    Transportation Needs:      Lack of Transportation (Medical):      Lack of Transportation (Non-Medical):    Physical Activity:      Days of Exercise per Week:      Minutes of Exercise per Session:    Stress:      Feeling of Stress :    Social Connections:      Frequency of Communication with Friends and Family:      Frequency of Social Gatherings with Friends and Family:      Attends Buddhism Services:      Active Member of Clubs or Organizations:      Attends Club or Organization Meetings:      Marital Status:    Intimate Partner Violence:      Fear of Current or Ex-Partner:      Emotionally Abused:      Physically Abused:      Sexually Abused:    Depression: Not at risk     PHQ-2 Score: 0   Housing Stability:      Unable to Pay for Housing in the Last Year:      Number of Places Lived in the Last Year:      Unstable Housing in the Last Year:        Functional Status:  Prior to admission patient needed assistance:   Dependent ADLs:: Independent  Dependent IADLs:: Shopping, Laundry, Cooking  Assesssment of Functional Status: Not at baseline with mobility    Mental Health Status:  Mental Health Status: No Current Concerns       Chemical Dependency Status:  Chemical Dependency Status: No Current Concerns             Values/Beliefs:  Spiritual, Cultural Beliefs, Buddhism Practices, Values that affect care: yes  Description of Beliefs that Will Affect Care:             Additional Information:  Met with patient in room with  Demar and daughter Karin. Discussed discharge planning and that therapy recommends that she have in home therapies. Dicussed options for home care, and patient and family decided on [facility]. Family is very involved and supportive,  states that he does all the household chores and that he feels like he can care for her at home. RN[facility] made  referrals to [facility] for RN, PT/OT services.    [place] [id number] [place] [address], [place] [address]  Phone [phone]   Fax [phone]    Adelita Chakraborty RN Care Coordinator   MICU/SICU  [phone]         Adelita Chakraborty, RN

## 2021-08-25 NOTE — PROGRESS NOTES
D/I/A: Radial band on light radial.  Site CDI, no hematoma.  Stasis achieved at 1850. Off bedrest @ 1800.  A+O x4 and making needs known.  Denies pain or sob.  VSSA.  Tolerated band removal well.  P: Continue to monitor status.

## 2021-08-25 NOTE — PROGRESS NOTES
[facility]    Progress Note - Cassie 4 Service        Date of Admission:  8/18/2021    Assessment & Plan             Berta AYE Nava is a 55 year old female with 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 She presented in stable condition from home for potential liver transplant. Patient was taken to the OR. Intraop, abdomen was opened, ascites was evacuated and falciform and left triangular ligament divided. At this time, patient developed vtach requiring 5-6 rounds of debrillation and the procedure was aborted. Consideration for starting ECMO, but patient converted out. Patient incisions closed. Taken to CT for concern for PE. CT showed no signs of PE. Initially admitted to SICU due to unstable hemodynamics on 3 pressors. Transferred to MICU 8/22 after coming of pressors. Transferred to floor 8/23.    # Hepatic Cirrhosis Secondary to PSC/UC  Patient being reevaluated for liver transplantation due to arrhythmias. Currently inactive on transplant list.  - ROMA drain x1, continue per transplant surgery, maintain 1:1 albumin  - Hepatology consulted, appreciate recs               -continue home diuretics, held currently for angiogram               -diagnostic ascites studies obtained and as below               -Abx U/S with doppler completed  - Continue PTA ursodiol   - Lactulose 20 BID ordered, PRN senna and miralax    #. Intraoperative V tach requiring 4 rounds of defibrillation   #. Distributive vs hypovolemic shock, resolved   Patient had wnl stress ECHO pre-op with EF 60-65%. Intraoperative v tach requiring defibrillation, came to SICU on 3 pressors. TTE post op 8/19 with global and regional left ventricular function is normal with an EF of 60-65%. Off pressors since 8/20.  - MAP goal > 55  - Amiodarone PO taper per EP  - Cards consulted, appreciate recs  - Plan for coronary  angiogram 8/25, patient s/p pretreatment given contrast allergy     #. SIRS  Unclear if any infectious source. Initially thought to possibly be cholangitis but less likely per discussion with transplant hepatology. Greatest concern for an intraabdominal source. OR, blood, and sputum cultures with NGTD. EBV IgG positive but IgM not reactive. At this point low suspicion for infection.   - ID consulted  - CTM Cx NGTD  - Ascitic fluid analysis: Cx NGTD, cell counts appear reactive, gram stain w/o organisms   - Disontinue zosyn    #. Post Operative Abdominal Pain  - Oxycodone 5 mg q4 PRN    #. Acute blood loss anemia    S/p transfusion: 3 units intraoperative   - Goals: Hb > 8, Fibrinogen >200, INR < 2, plts > 50  - Continue to trend Hb     #. Post operative respiratory failure, resolved   - Extubated, on RA    #. GONZALO (BL Creat 0.7), resolved   - continue to monitor kidney function and urine output     #. Stress hyperglycemia, resolved  In setting of steroid administration, no longer needing insulin over past several days  - sliding scale insulin discontinued    #. Deconditioning  - Will plan to consult PT and OT pending further transplant evaluation     Diet: NPO for Medical/Clinical Reasons Except for: Meds  NPO per Anesthesia Guidelines for Procedure/Surgery Except for: Meds  Snacks/Supplements Adult: Other; whatever patient prefers; Between Meals    DVT Prophylaxis: Enoxaparin (Lovenox) SQ  Jurado Catheter: Not present  Fluids: PO  Central Lines: None  Code Status: Full Code      Disposition Plan   Expected discharge: >3 days recommended to TBD once transplant evaluation completed.     The patient's care was discussed with the Attending Physician, Dr. Hay.    Augustine Pena MD  [address]  Securely message with the Vocera Web Console (learn more here)  Text page via [employer] Paging/Directory  Please see sign in/sign out for up to date coverage  information    Clinically Significant Risk Factors Present on Admission                ______________________________________________________________________    Interval History   NAEO. Patient reports abdominal discomfort is improved. She otherwise denies any symptoms at this time. She reports she is eager for her angiogram and further liver work up Patient without other acute concerns this AM.    4 point ROS otherwise negative    Data reviewed today: I reviewed all medications, new labs and imaging results over the last 24 hours.    Physical Exam   Vital Signs: Temp: 98.1  F (36.7  C) Temp src: Oral BP: 96/65 Pulse: 68   Resp: 14 SpO2: 98 % O2 Device: None (Room air)    Weight: 150 lbs 12.71 oz  General: awake, sitting up  HEENT:NC/AT, scleral icterus present    Pulm/Resp: no increased work of breathing, symmetric chest expansion   CV: RRR, S1. S2 present, no murmur appreciated   Abdomen: Soft, mildly distended, non-tender, no rebound tenderness or guarding, no masses ROMA drain w/ serosanguinous fluids.   Incisions/Skin: no jaundice   MSK/Extremities: trace peripheral edema, moving all extremities, calves soft, extremities well perfused    Data   Recent Labs   Lab 08/25/21  0850 08/25/21  0352 08/24/21  0821 08/24/21  0339 08/23/21  1635 08/20/21  1707 08/20/21  1533 08/20/21  0859 08/20/21  0849 08/20/21  0419 08/20/21  0418   WBC  --  8.1  --  11.3* 14.3*   < >  --   --   --   --   --    HGB  --  8.7*  --  9.0* 10.5*   < > 8.3*   < > 9.4*  --   --    MCV  --  91  --  89 91   < >  --   --   --   --   --    PLT  --  121*  --  160 201   < >  --   --   --   --   --    INR  --  2.03*  --  1.81* 1.50*   < >  --   --   --   --   --    NA  --  138  --  136 135   < >  --   --  135  --  133   POTASSIUM  --  3.4  --  3.7 3.5   < >  --   --  4.0  --  4.0   CHLORIDE  --  109  --  108 106   < >  --   --  106  --  104   CO2  --  20  --  21 20   < >  --   --  18*  --  14*   BUN  --  27  --  37* 47*   < >  --   --  25  --  24    CR  --  0.50*  --  0.58 0.61   < >  --   --  0.82  --  0.78   ANIONGAP  --  9  --  7 9   < >  --   --  11  --  15*   MARIELENA  --  8.2*  --  9.1 9.4   < >  --   --  8.7  --  9.4   * 117* 80 83 113*   < >  --   --  176*   < > 199*   ALBUMIN  --  3.3*  --  3.4 3.4   < > 4.2   < > 3.9  --   --    PROTTOTAL  --  5.4*  --  5.5* 6.0*   < > 6.2*   < > 6.7*  --   --    BILITOTAL  --  4.7*  --  4.2* 4.3*   < > 4.5*   < > 5.1*  --   --    ALKPHOS  --  240*  --  307* 368*   < > 157*   < > 196*  --   --    ALT  --  31  --  34 39   < > 24   < > 28  --   --    AST  --  43  --  52* 59*   < > 52*   < > 59*  --   --    TROPONIN  --   --   --   --   --   --  0.242*  --  0.311*  --  0.325*    < > = values in this interval not displayed.     No results found for this or any previous visit (from the past 24 hour(s)).  Medications     sodium chloride 150 mL/hr at 08/25/21 0858       amiodarone  400 mg Oral BID    Followed by     [START ON 8/31/2021] amiodarone  200 mg Oral Daily     diphenhydrAMINE  25 mg Intravenous Once     enoxaparin ANTICOAGULANT  40 mg Subcutaneous Q24H     [Held by provider] furosemide  40 mg Oral Daily     lactulose  20 g Oral BID     magnesium oxide  400 mg Oral BID     sodium chloride (PF)  3 mL Intravenous Q8H     [Held by provider] spironolactone  100 mg Oral Daily     ursodiol  300 mg Oral BID

## 2021-08-26 ENCOUNTER — APPOINTMENT (OUTPATIENT)
Dept: OCCUPATIONAL THERAPY | Facility: CLINIC | Age: 56
DRG: 420 | End: 2021-08-26
Attending: TRANSPLANT SURGERY
Payer: COMMERCIAL

## 2021-08-26 LAB
ALBUMIN SERPL-MCNC: 3.4 G/DL (ref 3.4–5)
ALP SERPL-CCNC: 193 U/L (ref 40–150)
ALT SERPL W P-5'-P-CCNC: 31 U/L (ref 0–50)
ANION GAP SERPL CALCULATED.3IONS-SCNC: 7 MMOL/L (ref 3–14)
AST SERPL W P-5'-P-CCNC: 32 U/L (ref 0–45)
BACTERIA PRT CULT: NORMAL
BILIRUB DIRECT SERPL-MCNC: 2.3 MG/DL (ref 0–0.2)
BILIRUB SERPL-MCNC: 3.6 MG/DL (ref 0.2–1.3)
BUN SERPL-MCNC: 24 MG/DL (ref 7–30)
CALCIUM SERPL-MCNC: 8.8 MG/DL (ref 8.5–10.1)
CHLORIDE BLD-SCNC: 112 MMOL/L (ref 94–109)
CO2 SERPL-SCNC: 21 MMOL/L (ref 20–32)
CREAT SERPL-MCNC: 0.46 MG/DL (ref 0.52–1.04)
ERYTHROCYTE [DISTWIDTH] IN BLOOD BY AUTOMATED COUNT: 16.1 % (ref 10–15)
FERRITIN SERPL-MCNC: 161 NG/ML (ref 8–252)
GFR SERPL CREATININE-BSD FRML MDRD: >90 ML/MIN/1.73M2
GLUCOSE BLD-MCNC: 97 MG/DL (ref 70–99)
GLUCOSE BLDC GLUCOMTR-MCNC: 108 MG/DL (ref 70–99)
GLUCOSE BLDC GLUCOMTR-MCNC: 86 MG/DL (ref 70–99)
GLUCOSE BLDC GLUCOMTR-MCNC: 89 MG/DL (ref 70–99)
HCT VFR BLD AUTO: 26.5 % (ref 35–47)
HGB BLD-MCNC: 8.6 G/DL (ref 11.7–15.7)
INR PPP: 2.13 (ref 0.85–1.15)
MAGNESIUM SERPL-MCNC: 2 MG/DL (ref 1.6–2.3)
MCH RBC QN AUTO: 29.5 PG (ref 26.5–33)
MCHC RBC AUTO-ENTMCNC: 32.5 G/DL (ref 31.5–36.5)
MCV RBC AUTO: 91 FL (ref 78–100)
PHOSPHATE SERPL-MCNC: 2 MG/DL (ref 2.5–4.5)
PLATELET # BLD AUTO: 131 10E3/UL (ref 150–450)
POTASSIUM BLD-SCNC: 3.1 MMOL/L (ref 3.4–5.3)
POTASSIUM BLD-SCNC: 3.3 MMOL/L (ref 3.4–5.3)
POTASSIUM BLD-SCNC: 3.8 MMOL/L (ref 3.4–5.3)
PROT SERPL-MCNC: 5.4 G/DL (ref 6.8–8.8)
RBC # BLD AUTO: 2.92 10E6/UL (ref 3.8–5.2)
SARS-COV-2 RNA RESP QL NAA+PROBE: NEGATIVE
SODIUM SERPL-SCNC: 140 MMOL/L (ref 133–144)
WBC # BLD AUTO: 13.3 10E3/UL (ref 4–11)

## 2021-08-26 PROCEDURE — 250N000013 HC RX MED GY IP 250 OP 250 PS 637: Performed by: INTERNAL MEDICINE

## 2021-08-26 PROCEDURE — 84100 ASSAY OF PHOSPHORUS: CPT | Performed by: STUDENT IN AN ORGANIZED HEALTH CARE EDUCATION/TRAINING PROGRAM

## 2021-08-26 PROCEDURE — 250N000013 HC RX MED GY IP 250 OP 250 PS 637: Performed by: TRANSPLANT SURGERY

## 2021-08-26 PROCEDURE — 85027 COMPLETE CBC AUTOMATED: CPT | Performed by: STUDENT IN AN ORGANIZED HEALTH CARE EDUCATION/TRAINING PROGRAM

## 2021-08-26 PROCEDURE — 97530 THERAPEUTIC ACTIVITIES: CPT | Mod: GO

## 2021-08-26 PROCEDURE — 36592 COLLECT BLOOD FROM PICC: CPT | Performed by: STUDENT IN AN ORGANIZED HEALTH CARE EDUCATION/TRAINING PROGRAM

## 2021-08-26 PROCEDURE — 250N000011 HC RX IP 250 OP 636: Performed by: NURSE PRACTITIONER

## 2021-08-26 PROCEDURE — U0003 INFECTIOUS AGENT DETECTION BY NUCLEIC ACID (DNA OR RNA); SEVERE ACUTE RESPIRATORY SYNDROME CORONAVIRUS 2 (SARS-COV-2) (CORONAVIRUS DISEASE [COVID-19]), AMPLIFIED PROBE TECHNIQUE, MAKING USE OF HIGH THROUGHPUT TECHNOLOGIES AS DESCRIBED BY CMS-2020-01-R: HCPCS | Performed by: STUDENT IN AN ORGANIZED HEALTH CARE EDUCATION/TRAINING PROGRAM

## 2021-08-26 PROCEDURE — 83735 ASSAY OF MAGNESIUM: CPT | Performed by: STUDENT IN AN ORGANIZED HEALTH CARE EDUCATION/TRAINING PROGRAM

## 2021-08-26 PROCEDURE — 250N000011 HC RX IP 250 OP 636: Performed by: STUDENT IN AN ORGANIZED HEALTH CARE EDUCATION/TRAINING PROGRAM

## 2021-08-26 PROCEDURE — 99232 SBSQ HOSP IP/OBS MODERATE 35: CPT | Mod: GC | Performed by: STUDENT IN AN ORGANIZED HEALTH CARE EDUCATION/TRAINING PROGRAM

## 2021-08-26 PROCEDURE — 84132 ASSAY OF SERUM POTASSIUM: CPT | Performed by: STUDENT IN AN ORGANIZED HEALTH CARE EDUCATION/TRAINING PROGRAM

## 2021-08-26 PROCEDURE — 250N000013 HC RX MED GY IP 250 OP 250 PS 637: Performed by: STUDENT IN AN ORGANIZED HEALTH CARE EDUCATION/TRAINING PROGRAM

## 2021-08-26 PROCEDURE — 85610 PROTHROMBIN TIME: CPT | Performed by: STUDENT IN AN ORGANIZED HEALTH CARE EDUCATION/TRAINING PROGRAM

## 2021-08-26 PROCEDURE — 120N000002 HC R&B MED SURG/OB UMMC

## 2021-08-26 PROCEDURE — 84132 ASSAY OF SERUM POTASSIUM: CPT | Performed by: INTERNAL MEDICINE

## 2021-08-26 PROCEDURE — 82728 ASSAY OF FERRITIN: CPT | Performed by: STUDENT IN AN ORGANIZED HEALTH CARE EDUCATION/TRAINING PROGRAM

## 2021-08-26 PROCEDURE — 82248 BILIRUBIN DIRECT: CPT | Performed by: STUDENT IN AN ORGANIZED HEALTH CARE EDUCATION/TRAINING PROGRAM

## 2021-08-26 PROCEDURE — 99231 SBSQ HOSP IP/OBS SF/LOW 25: CPT | Mod: GC | Performed by: STUDENT IN AN ORGANIZED HEALTH CARE EDUCATION/TRAINING PROGRAM

## 2021-08-26 PROCEDURE — 250N000013 HC RX MED GY IP 250 OP 250 PS 637: Performed by: NURSE PRACTITIONER

## 2021-08-26 PROCEDURE — 99207 PR CDG-MDM COMPONENT: MEETS MODERATE - DOWN CODED: CPT | Performed by: STUDENT IN AN ORGANIZED HEALTH CARE EDUCATION/TRAINING PROGRAM

## 2021-08-26 PROCEDURE — 82374 ASSAY BLOOD CARBON DIOXIDE: CPT | Performed by: STUDENT IN AN ORGANIZED HEALTH CARE EDUCATION/TRAINING PROGRAM

## 2021-08-26 RX ORDER — POTASSIUM CHLORIDE 750 MG/1
20 TABLET, EXTENDED RELEASE ORAL ONCE
Status: COMPLETED | OUTPATIENT
Start: 2021-08-26 | End: 2021-08-26

## 2021-08-26 RX ORDER — POTASSIUM CHLORIDE 750 MG/1
40 TABLET, EXTENDED RELEASE ORAL ONCE
Status: COMPLETED | OUTPATIENT
Start: 2021-08-26 | End: 2021-08-26

## 2021-08-26 RX ADMIN — Medication 5 ML: at 21:43

## 2021-08-26 RX ADMIN — Medication 400 MG: at 08:44

## 2021-08-26 RX ADMIN — ENOXAPARIN SODIUM 40 MG: 100 INJECTION SUBCUTANEOUS at 08:45

## 2021-08-26 RX ADMIN — POTASSIUM CHLORIDE 40 MEQ: 750 TABLET, EXTENDED RELEASE ORAL at 08:46

## 2021-08-26 RX ADMIN — URSODIOL 300 MG: 300 CAPSULE ORAL at 08:44

## 2021-08-26 RX ADMIN — LACTULOSE 20 G: 10 POWDER, FOR SOLUTION ORAL at 21:40

## 2021-08-26 RX ADMIN — Medication 400 MG: at 19:47

## 2021-08-26 RX ADMIN — Medication 5 ML: at 06:32

## 2021-08-26 RX ADMIN — LACTULOSE 20 G: 10 POWDER, FOR SOLUTION ORAL at 08:45

## 2021-08-26 RX ADMIN — AMIODARONE HYDROCHLORIDE 400 MG: 200 TABLET ORAL at 08:44

## 2021-08-26 RX ADMIN — OXYCODONE HYDROCHLORIDE 5 MG: 5 TABLET ORAL at 13:46

## 2021-08-26 RX ADMIN — POTASSIUM & SODIUM PHOSPHATES POWDER PACK 280-160-250 MG 1 PACKET: 280-160-250 PACK at 10:10

## 2021-08-26 RX ADMIN — AMIODARONE HYDROCHLORIDE 400 MG: 200 TABLET ORAL at 19:48

## 2021-08-26 RX ADMIN — POTASSIUM CHLORIDE 20 MEQ: 750 TABLET, EXTENDED RELEASE ORAL at 14:30

## 2021-08-26 RX ADMIN — POTASSIUM & SODIUM PHOSPHATES POWDER PACK 280-160-250 MG 1 PACKET: 280-160-250 PACK at 19:46

## 2021-08-26 RX ADMIN — URSODIOL 300 MG: 300 CAPSULE ORAL at 19:47

## 2021-08-26 RX ADMIN — POTASSIUM & SODIUM PHOSPHATES POWDER PACK 280-160-250 MG 1 PACKET: 280-160-250 PACK at 13:41

## 2021-08-26 ASSESSMENT — ACTIVITIES OF DAILY LIVING (ADL)
ADLS_ACUITY_SCORE: 16

## 2021-08-26 ASSESSMENT — MIFFLIN-ST. JEOR: SCORE: 1229.53

## 2021-08-26 NOTE — PROGRESS NOTES
CLINICAL NUTRITION SERVICES - REASSESSMENT NOTE     Nutrition Prescription    RECOMMENDATIONS FOR MDs/PROVIDERS TO ORDER:  - Would consider trial of appetite stimulant if po intakes slow to improve     Malnutrition Status:    Severe malnutrition in the context of acute illness      Recommendations already ordered by Registered Dietitian (RD):  - Order Calorie Counts on 8/27 x 3 days to assess po adequacy  - Enter prn order for Ensure Enlive supplement with meals    Future/Additional Recommendations:  - Encourage po intakes with small frequent meals every 3-4 hrs.   - If results of calorie counts meeting < 75% of pt's estimated needs (1350 kcals and 65 g PRO), would recommend initiating TF therapy as follow:  Osmolite 1.5 Chris @ goal of  55ml/hr  (1320ml/day)  will provide: 1980 kcals (33 kcal/kg), 83 g PRO (1.4 g/kg), 1005 ml free H20, 268 g CHO, and 0 g fiber daily. + 2 pkts ProSource per day. Total provisions: 2060 kcal (34 kcal/kg) and 105 g PRO (1.8 g/kg).  - Initiate @ 15 ml/hr and advance by 10 ml q8hr pending pt's tolerance  - Do not start or advance TF rate unless Mg++ >1.5, K+ is >3, and phos >1.9  - Recommend 30 ml q4hr fluid flushes for tube patency. Additional fluids and/or adjustments per MD.    - Order multivitamin/mineral (15 ml/day via FT) to help ensure micronutrient needs being met with suspected hypermetabolic demands and potential interruptions to TF infusions.     If fluid restricted formula required recommend: Goal TwoCal HN @ 40 mL/hr (960 mL/day) to provide 1920 kcals (32 kcal/kg/day), 81 g PRO (1.4 g/kg/day), 672 mL H2O, 210 g CHO and 5 g Fiber daily. + 2 pkts ProSource per day. Total provisions: 2000 kcal (33 kcal/kg) and 103 g PRO (1.7 g/kg). O)     EVALUATION OF THE PROGRESS TOWARD GOALS   Diet: Regular; 2 gm NA++ as of today.  - Per review of diet ordering, po diet has fluctuated betw NPO/CLs/2 g NA++ diet off and on over the past week.    Intake: Ate 75% x one meal today per  RN/flowsheets (pt unavailable at time of visit). Due to multiple interruptions to diet order over the past week, suspect overall low po intakes.      Nutrition Support:TF therapy ordered on 8/21, but was not initiated.     NEW FINDINGS   Weight: 64.4 kg (today), 59.5 kg (8/18 - admit wt); Suspect increase wt d/t fluids over the past week.  Weight loss of 8.9 kg (13%) over the past ~2 months      Labs:  K+ 3.3 (low)  today after replacement given for value of 3.1 this am  PO4 2.0 (low) today; Neutra-Phos ordered today    MALNUTRITION  % Intake: </= 50% for >/= 5 days (severe) d/t multiple interruptions to po diet order and intakes.   % Weight Loss: > 7.5% in 3 months (severe) - ongoing  Subcutaneous Fat Loss: Unable to assess  Muscle Loss: Unable to assess  Fluid Accumulation/Edema: None noted per chart review  Malnutrition Diagnosis: Severe malnutrition in the context of acute illness    Previous Goals   Diet adv v nutrition support within 2-3 days.    Evaluation: Not consistently met d/t no initiation of TF therapy and multiple interruptions for consistent diet order to allow for consistent po intakes.     Previous Nutrition Diagnosis  Inadequate protein-energy intake related to NPO status as evidenced by meeting 0% nutrition needs currently      Evaluation: No change    CURRENT NUTRITION DIAGNOSIS  Inadequate protein-energy intake related to suspect inconsistent po intakes d/t multiple interruptions to diet order and TF therapy was not initiates as evidenced by po intakes of < 50% over the past week and h/o eight loss of 8.9 kg (13%) over the past ~2 months    INTERVENTIONS  Implementation  Calorie counts   Medical food supplement therapy    Goals  1. Ave po intakes per calorie counts x 3 days to meet at least 75% of pt's nutritional needs (1350 kcals and 65 g PRO) vs need for nutritional support     Monitoring/Evaluation  Progress toward goals will be monitored and evaluated per protocol.    Re Berkowitz  BERTO,NAVID  7D pager [phone]

## 2021-08-26 NOTE — PROGRESS NOTES
"[facility]    Hepatology Follow-up    CC: Vtach    Dx: Hepatic cirrhosis 2/2 PSC                  Vtach    24 hour events:   Angiogram yesterday was normal  Subjective:  No abd pain  ROMA in place with increased output    Medications  Current Facility-Administered Medications   Medication Dose Route Frequency    amiodarone  400 mg Oral BID    Followed by    [START ON 8/31/2021] amiodarone  200 mg Oral Daily    enoxaparin ANTICOAGULANT  40 mg Subcutaneous Q24H    [Held by provider] furosemide  40 mg Oral Daily    heparin lock flush  5-20 mL Intracatheter Q24H    lactulose  20 g Oral BID    magnesium oxide  400 mg Oral BID    potassium chloride  40 mEq Oral Once    sodium chloride (PF)  10-40 mL Intracatheter Q8H    sodium chloride (PF)  10-40 mL Intracatheter Q8H    sodium chloride (PF)  3 mL Intravenous Q8H    [Held by provider] spironolactone  100 mg Oral Daily    ursodiol  300 mg Oral BID       Review of systems  A 10-point review of systems was negative.    Examination  /63 (BP Location: Right arm)   Pulse 79   Temp 96.9  F (36.1  C) (Temporal)   Resp 16   Ht 1.651 m (5' 5\")   Wt 64.4 kg (141 lb 14.4 oz)   SpO2 100%   BMI 23.61 kg/m      Constitutional: cooperative, pleasant  Eyes: Sclera icteric  Ears/nose/mouth/throat: mucus membranes moist  Neck: supple  CV: No edema  Respiratory: Unlabored breathing   Abd: Nondistended, +bs, nontender, incisions in place, ROMA with serosanguinous output  Skin: warm, perfused, jaundiced  Neuro: AAO x 3, no asterixis      MELD-Na score: 20 at 8/26/2021  6:39 AM  MELD score: 20 at 8/26/2021  6:39 AM  Calculated from:  Serum Creatinine: 0.46 mg/dL (Using min of 1 mg/dL) at 8/26/2021  6:39 AM  Serum Sodium: 140 mmol/L (Using max of 137 mmol/L) at 8/26/2021  6:39 AM  Total Bilirubin: 3.6 mg/dL at 8/26/2021  6:39 AM  INR(ratio): 2.13 at 8/26/2021  6:39 AM  Age: 55 years    Radiology  CT Chest 8/19/21:  IMPRESSION:   1. No evidence of pulmonary " embolism.  Contrast mixing artifact in the  left lower lobe superior segment pulmonary artery. No evidence of  right heart strain.  2. Endotracheal tube abutting the caryn.  3. Atelectasis/collapse of the posterior aspects of the bilateral  upper and lower lobes.   4. Tree in bud nodularity involving the left upper, left lower, and  the left middle ribs. Possibilities include infectious and  inflammatory etiologies. Recommend follow-up until resolution.  5. 5 mm pulmonary nodule of the right upper lobe, if the patient is  considered high risk for lung cancer consider follow-up low-dose chest  CT in 12 months.    US RUQ 8/23/21:  Impression:   1.  Cirrhotic liver with retrograde flow in the left portal vein  suggestive of portal hypertension. No evidence for portal venous  thrombosis.  2.  Mild ascites.      Assessment  55 year old with past medical history of hypertension, microscopic otitis, ulcerative colitis, pancreatic mucinous cystadenoma, PSC with cirrhosis who presented for liver transplantation but went into V. tach and surgery was aborted.  Patient was requiring pressor support and was transferred to ICU, now recovering on the floor    PSC cirrhosis meld 20  Patient was scheduled for ERCP the day of her aborted transplant surgery.  She has been requiring ERCP every 4 weeks with stent placement as bridge to liver transplantation.  Plan for potential ERCP during this hospital stay for stent exchange.    Intra-op VT   Patient has been extubated and not on pressors, her clinical course is more consistent with V. tach causing instability, less likely to be cholangitis given remarkable improvement.  Pt had cardiology evaluation with recommendation of  cardiac cath which was negative for any obstructive coronary arterial disease. Transplant surgery has been involved and wants to rule out cardiac siderosis as possible cause of her V. Tach.    Intra-abdominal ROMA drain  Patient has been requiring weekly  paracentesis, now she has ROMA drain after her reported transplant surgery, which likely will continue to have increase output secondary to her history of cirrhosis and ascites. With the drain, she is at risk of developing SBP. She may require paracentesis every day to avoid fluid leakage if plans to remove ROMA drain by transplant surgery.    Recommendations  -- Recommend to check ferritin  -- Plan to discuss timing of ERCP with panc bili team  -- Lactulose PO, or Miralax  -- Continue Ursodiol  -- Monitor transaminases, bilirubin, INR  -- Ensure sodium restriction to 2000 mg per day  -- Would hold diuretics while drain in place given risk of GONZALO. Would prefer she be on ppx abx given risk of infection with her drain. Discussion with transplant surgery regarding timing of drain removal - she will likely she frequent paracentesis and diuretics after removal to reduce risk of leaking  -- Monitor intake and output  -- Adequate protein diet (1.2 - 1.5g/kg/day)   - Recommend multiple meals during the day, Snacks and shakes during the day/night   - Can use Ensure/Boost drinks TID  --PT/OT    Thank you for involving us in this patient's care. Please do not hesitate to contact the GI service with any questions or concerns.      Pt care plan discussed with Dr. Mao, Hepatology staff physician.    This note was created with voice recognition software, and while reviewed for accuracy, typos may remain.    Audra Fuentes MD  Advance and Transplant Hepatology Fellow  Pager: [phone]

## 2021-08-26 NOTE — DISCHARGE INSTRUCTIONS
Going Home after an Angioplasty or Stent Placement (Cardiac)  ______________________________________________      After you go home:    Have an adult stay with you for 24 hours.    Drink plenty of fluids.    You may eat your normal diet, unless your doctor tells you otherwise.    For 24 hours:    Relax and take it easy.    Do NOT smoke.    Do NOT make any important or legal decisions.    Do NOT drive or operate machines at home or at work.    Do NOT drink alcohol.    Remove the Band-Aid after 24 hours. If there is minor oozing, apply another Band-aid and remove it after 12 hours.    For 2 days, do NOT have sex or do any heavy exercise.    Do NOT take a bath, or use a hot tub or pool for at least 3 days. You may shower.    Care of groin site  It is normal to have a small bruise or lump at the site.    Do not scrub the site.    For the first 2 days: Do not stoop or squat. When you cough, sneeze or move your bowels, hold your hand over the puncture site and press gently.    Do not lift more than 10 pounds for at least 3 to 5 days.    Do not use lotion or powder near the puncture site for 3 days.    If you start bleeding from the site in your groin, lie down flat and press firmly  on the site. Call your doctor as soon as you can.    Care of wrist or arm site  It is normal to have soreness at the puncture site and mild tingling in your hand for up to 3 days.    For 2 days, do not use your hand or arm to support your weight (such as rising from a chair) or bend your wrist (such as lifting a garage door).    For 2 days, do not lift more than 5 pounds or exercise your arm (tennis, golf or bowling).    If you start bleeding from the site in your arm:    Sit down and press firmly on the site with your fingers for 10 minutes. Call your doctor as soon as you can.    If the bleeding stops, sit still and keep your wrist straight for 2 hours.    Medicines    If you have started taking Plavix or Effient, do not stop taking it  until you talk to your heart doctor (cardiologist).    If you are on metformin (Glucophage), do not restart it until you have blood tests (within 2 to 3 days after discharge). When your doctor tells you it is safe, you may restart the metformin.    If you have stopped any other medicines, check with your nurse or provider about when to restart them.    Call 911 right away if you have bleeding that is heavy or does not stop.    Call your doctor if:    You have a large or growing hard lump around the site.    The site is red, swollen, hot or tender.    Blood or fluid is draining from the site.    You have chills or a fever greater than 101 F (38 C).    Your leg or arm feels numb or cool.    You have hives, a rash or unusual itching.      [facility] [place]:  [phone] (7 days a week)

## 2021-08-26 NOTE — PROGRESS NOTES
[facility]    Progress Note - Cassie 4 Service        Date of Admission:  8/18/2021    Assessment & Plan             Bertaalfredo Nava is a 55 year old female with 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 She presented in stable condition from home for potential liver transplant. Patient was taken to the OR. Intraop, abdomen was opened, ascites was evacuated and falciform and left triangular ligament divided. At this time, patient developed vtach requiring 5-6 rounds of debrillation and the procedure was aborted. Consideration for starting ECMO, but patient converted out. Patient incisions closed. Taken to CT for concern for PE. CT showed no signs of PE. Initially admitted to SICU due to unstable hemodynamics on 3 pressors. Transferred to MICU 8/22 after coming of pressors. Transferred to floor 8/23.    # Hepatic Cirrhosis Secondary to PSC/UC  Patient being reevaluated for liver transplantation due to arrhythmias. Currently inactive on transplant list.  - ROMA drain x1, management per transplant surgery, 1:1 albumin discontinued   -When ROMA removed, patient will need daily paracentesis to prevent leakage  - Hepatology consulted, appreciate recs  -continue home diuretics  -diagnostic ascites studies obtained and as below  -Abx U/S with doppler completed  - Continue PTA ursodiol   - Lactulose 20 BID ordered, PRN senna and miralax  - Will obtain ferritin per hepatology recs  - Discussing inpatient ERCP with panc-bili service    #. Intraoperative V tach requiring 4 rounds of defibrillation   #. Distributive vs hypovolemic shock, resolved   Patient had wnl stress ECHO pre-op with EF 60-65%. Intraoperative v tach requiring defibrillation, came to SICU on 3 pressors. TTE post op 8/19 with global and regional left ventricular function is normal with an EF of 60-65%. Off pressors since  8/20.  - MAP goal > 55  - Amiodarone PO taper per EP  - Cards consulted, appreciate recs  - Cronary angiogram 8/25, without disease  - May consider cardiac MRI pending ferritin    #. SIRS  Unclear if any infectious source. Initially thought to possibly be cholangitis but less likely per discussion with transplant hepatology. Greatest concern for an intraabdominal source. OR, blood, and sputum cultures with NGTD. EBV IgG positive but IgM not reactive. At this point low suspicion for infection.   - ID consulted  - CTM Cx NGTD  - Ascitic fluid analysis: Cx NGTD, cell counts appear reactive, gram stain w/o organisms   - Monitoring off Abx    #. Post Operative Abdominal Pain  - Oxycodone 5 mg q4 PRN    #. Acute blood loss anemia    S/p transfusion: 3 units intraoperative   - Goals: Hb > 8, Fibrinogen >200, INR < 2, plts > 50  - Continue to trend Hb     #. Post operative respiratory failure, resolved   - Extubated, on RA    #. GONZALO (BL Creat 0.7), resolved   - continue to monitor kidney function and urine output     #. Stress hyperglycemia, resolved  In setting of steroid administration, no longer needing insulin over past several days  - sliding scale insulin discontinued    #. Deconditioning  - PT and OT consulted     Diet: Snacks/Supplements Adult: Other; whatever patient prefers; Between Meals  Combination Diet Regular Diet Adult; 2 gm NA Diet    DVT Prophylaxis: Enoxaparin (Lovenox) SQ  Jurado Catheter: Not present  Fluids: PO  Central Lines: PRESENT  CVC TRIPLE LUMEN Right Internal jugular-Site Assessment: WDL  Code Status: Full Code      Disposition Plan   Expected discharge: >3 days recommended to TBD once transplant evaluation completed.     The patient's care was discussed with the Attending Physician, Dr. Hay.    Augustine Pena MD  [address]  Securely message with the Vocera Web Console (learn more here)  Text page via [employer]  Paging/Directory  Please see sign in/sign out for up to date coverage information    Clinically Significant Risk Factors Present on Admission                ______________________________________________________________________    Interval History   NAEO. Patient reports no abdominal discomfort. She otherwise denies any symptoms at this time. She reports she is eager for further liver work up Patient without other acute concerns this AM.    4 point ROS otherwise negative    Data reviewed today: I reviewed all medications, new labs and imaging results over the last 24 hours.    Physical Exam   Vital Signs: Temp: 96.9  F (36.1  C) Temp src: Temporal BP: 106/63 Pulse: 79   Resp: 16 SpO2: 100 % O2 Device: None (Room air)    Weight: 141 lbs 14.4 oz  General: awake, sitting up  HEENT:NC/AT, scleral icterus present    Pulm/Resp: no increased work of breathing, symmetric chest expansion   CV: RRR, S1. S2 present, no murmur appreciated   Abdomen: Soft, mildly distended, non-tender, no rebound tenderness or guarding, no masses ROMA drain w/ serosanguinous fluids.   Incisions/Skin: no jaundice   MSK/Extremities: trace peripheral edema, moving all extremities, calves soft, extremities well perfused    Data   Recent Labs   Lab 08/26/21  1148 08/26/21  0639 08/25/21  0850 08/25/21  0352 08/24/21  0339 08/20/21  1707 08/20/21  1533 08/20/21  0859 08/20/21  0849 08/20/21  0419 08/20/21  0418   WBC  --  13.3*  --  8.1 11.3*   < >  --   --   --   --   --    HGB  --  8.6*  --  8.7* 9.0*   < > 8.3*   < > 9.4*  --   --    MCV  --  91  --  91 89   < >  --   --   --   --   --    PLT  --  131*  --  121* 160   < >  --   --   --   --   --    INR  --  2.13*  --  2.03* 1.81*   < >  --   --   --   --   --    NA  --  140  --  138 136   < >  --   --  135  --  133   POTASSIUM  --  3.1*  --  3.4 3.7   < >  --   --  4.0  --  4.0   CHLORIDE  --  112*  --  109 108   < >  --   --  106  --  104   CO2  --  21  --  20 21   < >  --   --  18*  --  14*   BUN   --  24  --  27 37*   < >  --   --  25  --  24   CR  --  0.46*  --  0.50* 0.58   < >  --   --  0.82  --  0.78   ANIONGAP  --  7  --  9 7   < >  --   --  11  --  15*   MARIELENA  --  8.8  --  8.2* 9.1   < >  --   --  8.7  --  9.4   * 97 111* 117* 83   < >  --   --  176*   < > 199*   ALBUMIN  --  3.4  --  3.3* 3.4   < > 4.2   < > 3.9  --   --    PROTTOTAL  --  5.4*  --  5.4* 5.5*   < > 6.2*   < > 6.7*  --   --    BILITOTAL  --  3.6*  --  4.7* 4.2*   < > 4.5*   < > 5.1*  --   --    ALKPHOS  --  193*  --  240* 307*   < > 157*   < > 196*  --   --    ALT  --  31  --  31 34   < > 24   < > 28  --   --    AST  --  32  --  43 52*   < > 52*   < > 59*  --   --    TROPONIN  --   --   --   --   --   --  0.242*  --  0.311*  --  0.325*    < > = values in this interval not displayed.     Recent Results (from the past 24 hour(s))   Cardiac Catheterization    Narrative    Normal coronaries.     Medications       amiodarone  400 mg Oral BID    Followed by     [START ON 8/31/2021] amiodarone  200 mg Oral Daily     enoxaparin ANTICOAGULANT  40 mg Subcutaneous Q24H     furosemide  40 mg Oral Daily     heparin lock flush  5-20 mL Intracatheter Q24H     lactulose  20 g Oral BID     magnesium oxide  400 mg Oral BID     potassium & sodium phosphates  1 packet Oral TID     sodium chloride (PF)  10-40 mL Intracatheter Q8H     sodium chloride (PF)  10-40 mL Intracatheter Q8H     sodium chloride (PF)  3 mL Intravenous Q8H     spironolactone  100 mg Oral Daily     ursodiol  300 mg Oral BID

## 2021-08-26 NOTE — PLAN OF CARE
A&Ox4. VSS. Telemetry monitoring NSR. Q4hr nueros remain unchanged. Denies N/V and SOB. 5mg Oxycodone given x1 to help relieve lower abdominal pain. ROMA drain draining excessive amount of serosanguinous fluid. >2L fluid this shift. Provider notified. K 3.1 and replaced PO. Recheck 3.3 and replaced again PO. Recheck order for 1930. Phosphorus 2.0 and being replaced TID today. Recheck in AM. Q4hr BG 97 and 108. Up walking halls with sister. Continue to monitor.

## 2021-08-26 NOTE — PROGRESS NOTES
Transplant Social Work Services Progress Note      Date of Initial Social Work Evaluation: 8/19/21  Collaborated with: Liver Transplant Team    Data: Berta presented for liver transplantation last week, but unfortunately during the early procedure she went into an unstable tachy arrhythmia and the surgery was aborted.  Intervention: I met with Berta and her sister and provided supportive counseling, validation and encouragement.  Assessment: Berta feels she is strong enough and health enough for liver transplantation, and she is hopeful she will be placed back on the wait list.  Education provided by SW: virtual liver transplant support group-attended a few weeks ago and found group very helpful  Plan:    Discharge Plans in Progress: home with skilled home care    Barriers to d/c plan: medical stability    Follow up Plan: I will remain available for the psychosocial needs of this patient.      JOSETTE Curiel, [facility]  Liver Transplant   Phone [phone]  Pager [phone]

## 2021-08-26 NOTE — PROGRESS NOTES
Lacey Cheema - Home Health referral received from hospital care team.  WE ARE OUT OF NETWORK WITH THE PATIENT S INSURANCE.  YOU WILL NEED TO PLEASE SEND THIS REFERRAL TO AN IN-NETWORK HOME CARE.  WHEN OUR INSURANCE TEAM WAS CHECKING HER INSURANCE COMPANY SAID IN NETWORK PROVIDERS ARE   3 IN-NETWORK PROVIDERS  1. [facility]       PH: [phone]  2. [facility]                   PH: [phone]  3. [facility]               PH: [phone]  WE WILL BE CLOSING THIS HOME CARE REFERRAL FOR [facility].  Questions [facility] Intake

## 2021-08-26 NOTE — PROVIDER NOTIFICATION
Provider notified of excessive ROMA drainage. >2L of fluid yet today. Dressing changed at side for drainage at insertion site.

## 2021-08-26 NOTE — PLAN OF CARE
[id number]  VSS. Pt on telemetry. Sinus rhythm. Alert and oriented. Pt arrived back to unit from cath lab at 2000. Angio site stable, no hematoma. CMS intact. TR band removed before arrival on unit. Internal jugular access heparin locked upon arrival to unit. Minimal complaints of pain, PRN oxy 5 mg given last evening for abdominal pain. Up SBA to bedside commode. Tolerating clear liquid diet well, can advance as tolerated. ROAM drain to gravity output 200 ml. Continue with POC.

## 2021-08-26 NOTE — PROGRESS NOTES
Brief Cardiology Consult Note    Patient underwent coronary angiogram yesterday with results of normal coronaries per interventionalist. Given this, no current evidence of ischemic coronary artery disease and no further cardiology workup needed at this point prior to liver transplant for pre-operative risk assessment. Unclear etiology of prior VT episode, though given normal coronaries, would be most likely due to non-cardiac primary etiology (such as electrolyte abnormality).    The patient was seen and discussed with Dr. Moseley, who agrees with the above assessment and plan. Thank you for involving us in the care of Berta Nava. Cardiology service will sign off.     Joanne Coates MD  Cardiology Fellow PGY4  P: [phone]

## 2021-08-26 NOTE — PROGRESS NOTES
Care Management Follow Up    Length of Stay (days): 7    Expected Discharge Date: 08/28/2021     Concerns to be Addressed: Discharge Planning     Patient plan of care discussed at interdisciplinary rounds: Yes    Anticipated Discharge Disposition: Home     Anticipated Discharge Services: Home Care  Anticipated Discharge DME: None    Patient/family educated on Medicare website which has current facility and service quality ratings: Yes  Education Provided on the Discharge Plan: Yes   Patient/Family in Agreement with the Plan: Yes    Referrals Placed by CM/SW:  Home Care  Private pay costs discussed: Not applicable    Additional Information:    Previous RNCC had made referral to [facility] for RN, PT, and OT services. Writer received notification from [facility] liaison, that they are out of network with patient's insurance and will not be able to take patient. Writer told that there insurance team checked patient's coverage and was told patient's in-network providers were:    1) [facility] (Phn: [phone])- they only provide RN. No PT/OT.  2) [facility] (Phn: [phone])- does not take pts insurance.  3) [facility] (Phn: [phone])- VM left.    RNCC will follow.    Malia Graves RN, BSN, PHN  Care Coordinator   P: [phone], [facility]

## 2021-08-27 ENCOUNTER — APPOINTMENT (OUTPATIENT)
Dept: PHYSICAL THERAPY | Facility: CLINIC | Age: 56
DRG: 420 | End: 2021-08-27
Attending: TRANSPLANT SURGERY
Payer: COMMERCIAL

## 2021-08-27 ENCOUNTER — APPOINTMENT (OUTPATIENT)
Dept: OCCUPATIONAL THERAPY | Facility: CLINIC | Age: 56
DRG: 420 | End: 2021-08-27
Attending: TRANSPLANT SURGERY
Payer: COMMERCIAL

## 2021-08-27 LAB
ALBUMIN FLD-MCNC: 0.6 G/DL
ALBUMIN SERPL-MCNC: 3.1 G/DL (ref 3.4–5)
ALP SERPL-CCNC: 253 U/L (ref 40–150)
ALT SERPL W P-5'-P-CCNC: 40 U/L (ref 0–50)
ANION GAP SERPL CALCULATED.3IONS-SCNC: 9 MMOL/L (ref 3–14)
APPEARANCE FLD: CLEAR
AST SERPL W P-5'-P-CCNC: 52 U/L (ref 0–45)
BILIRUB DIRECT SERPL-MCNC: 3.2 MG/DL (ref 0–0.2)
BILIRUB SERPL-MCNC: 4.6 MG/DL (ref 0.2–1.3)
BUN SERPL-MCNC: 27 MG/DL (ref 7–30)
CALCIUM SERPL-MCNC: 8.5 MG/DL (ref 8.5–10.1)
CHLORIDE BLD-SCNC: 108 MMOL/L (ref 94–109)
CO2 SERPL-SCNC: 19 MMOL/L (ref 20–32)
COLOR FLD: YELLOW
CREAT SERPL-MCNC: 0.6 MG/DL (ref 0.52–1.04)
ERYTHROCYTE [DISTWIDTH] IN BLOOD BY AUTOMATED COUNT: 16.5 % (ref 10–15)
GFR SERPL CREATININE-BSD FRML MDRD: >90 ML/MIN/1.73M2
GLUCOSE BLD-MCNC: 86 MG/DL (ref 70–99)
GLUCOSE BLDC GLUCOMTR-MCNC: 69 MG/DL (ref 70–99)
GLUCOSE BLDC GLUCOMTR-MCNC: 85 MG/DL (ref 70–99)
GRAM STAIN RESULT: NORMAL
GRAM STAIN RESULT: NORMAL
HCT VFR BLD AUTO: 28 % (ref 35–47)
HGB BLD-MCNC: 9.2 G/DL (ref 11.7–15.7)
INR PPP: 1.95 (ref 0.85–1.15)
LYMPHOCYTES NFR FLD MANUAL: 19 %
MAGNESIUM SERPL-MCNC: 1.8 MG/DL (ref 1.6–2.3)
MCH RBC QN AUTO: 29.6 PG (ref 26.5–33)
MCHC RBC AUTO-ENTMCNC: 32.9 G/DL (ref 31.5–36.5)
MCV RBC AUTO: 90 FL (ref 78–100)
MONOS+MACROS NFR FLD MANUAL: 76 %
NEUTS BAND NFR FLD MANUAL: 6 %
PHOSPHATE SERPL-MCNC: 2.5 MG/DL (ref 2.5–4.5)
PLATELET # BLD AUTO: 146 10E3/UL (ref 150–450)
POTASSIUM BLD-SCNC: 3.8 MMOL/L (ref 3.4–5.3)
PROT FLD-MCNC: 1.3 G/DL
PROT SERPL-MCNC: 5.4 G/DL (ref 6.8–8.8)
RBC # BLD AUTO: 3.11 10E6/UL (ref 3.8–5.2)
SODIUM SERPL-SCNC: 136 MMOL/L (ref 133–144)
WBC # BLD AUTO: 16.4 10E3/UL (ref 4–11)
WBC # FLD AUTO: 115 /UL

## 2021-08-27 PROCEDURE — 250N000011 HC RX IP 250 OP 636: Performed by: STUDENT IN AN ORGANIZED HEALTH CARE EDUCATION/TRAINING PROGRAM

## 2021-08-27 PROCEDURE — 82310 ASSAY OF CALCIUM: CPT | Performed by: STUDENT IN AN ORGANIZED HEALTH CARE EDUCATION/TRAINING PROGRAM

## 2021-08-27 PROCEDURE — 89050 BODY FLUID CELL COUNT: CPT | Performed by: STUDENT IN AN ORGANIZED HEALTH CARE EDUCATION/TRAINING PROGRAM

## 2021-08-27 PROCEDURE — 82042 OTHER SOURCE ALBUMIN QUAN EA: CPT | Performed by: STUDENT IN AN ORGANIZED HEALTH CARE EDUCATION/TRAINING PROGRAM

## 2021-08-27 PROCEDURE — 85610 PROTHROMBIN TIME: CPT | Performed by: STUDENT IN AN ORGANIZED HEALTH CARE EDUCATION/TRAINING PROGRAM

## 2021-08-27 PROCEDURE — 250N000013 HC RX MED GY IP 250 OP 250 PS 637: Performed by: STUDENT IN AN ORGANIZED HEALTH CARE EDUCATION/TRAINING PROGRAM

## 2021-08-27 PROCEDURE — 87040 BLOOD CULTURE FOR BACTERIA: CPT | Performed by: STUDENT IN AN ORGANIZED HEALTH CARE EDUCATION/TRAINING PROGRAM

## 2021-08-27 PROCEDURE — 36415 COLL VENOUS BLD VENIPUNCTURE: CPT | Performed by: STUDENT IN AN ORGANIZED HEALTH CARE EDUCATION/TRAINING PROGRAM

## 2021-08-27 PROCEDURE — 250N000013 HC RX MED GY IP 250 OP 250 PS 637: Performed by: INTERNAL MEDICINE

## 2021-08-27 PROCEDURE — 99207 PR CDG-MDM COMPONENT: MEETS MODERATE - DOWN CODED: CPT | Performed by: STUDENT IN AN ORGANIZED HEALTH CARE EDUCATION/TRAINING PROGRAM

## 2021-08-27 PROCEDURE — 89051 BODY FLUID CELL COUNT: CPT | Performed by: STUDENT IN AN ORGANIZED HEALTH CARE EDUCATION/TRAINING PROGRAM

## 2021-08-27 PROCEDURE — 85027 COMPLETE CBC AUTOMATED: CPT | Performed by: STUDENT IN AN ORGANIZED HEALTH CARE EDUCATION/TRAINING PROGRAM

## 2021-08-27 PROCEDURE — 120N000002 HC R&B MED SURG/OB UMMC

## 2021-08-27 PROCEDURE — 80053 COMPREHEN METABOLIC PANEL: CPT | Performed by: STUDENT IN AN ORGANIZED HEALTH CARE EDUCATION/TRAINING PROGRAM

## 2021-08-27 PROCEDURE — 83735 ASSAY OF MAGNESIUM: CPT | Performed by: STUDENT IN AN ORGANIZED HEALTH CARE EDUCATION/TRAINING PROGRAM

## 2021-08-27 PROCEDURE — 87070 CULTURE OTHR SPECIMN AEROBIC: CPT | Performed by: STUDENT IN AN ORGANIZED HEALTH CARE EDUCATION/TRAINING PROGRAM

## 2021-08-27 PROCEDURE — 84100 ASSAY OF PHOSPHORUS: CPT | Performed by: STUDENT IN AN ORGANIZED HEALTH CARE EDUCATION/TRAINING PROGRAM

## 2021-08-27 PROCEDURE — 97535 SELF CARE MNGMENT TRAINING: CPT | Mod: GO

## 2021-08-27 PROCEDURE — 250N000013 HC RX MED GY IP 250 OP 250 PS 637: Performed by: NURSE PRACTITIONER

## 2021-08-27 PROCEDURE — 87205 SMEAR GRAM STAIN: CPT | Performed by: STUDENT IN AN ORGANIZED HEALTH CARE EDUCATION/TRAINING PROGRAM

## 2021-08-27 PROCEDURE — 84157 ASSAY OF PROTEIN OTHER: CPT | Performed by: STUDENT IN AN ORGANIZED HEALTH CARE EDUCATION/TRAINING PROGRAM

## 2021-08-27 PROCEDURE — 97530 THERAPEUTIC ACTIVITIES: CPT | Mod: GO

## 2021-08-27 PROCEDURE — 97530 THERAPEUTIC ACTIVITIES: CPT | Mod: GP | Performed by: REHABILITATION PRACTITIONER

## 2021-08-27 PROCEDURE — 99232 SBSQ HOSP IP/OBS MODERATE 35: CPT | Mod: GC | Performed by: STUDENT IN AN ORGANIZED HEALTH CARE EDUCATION/TRAINING PROGRAM

## 2021-08-27 PROCEDURE — 36592 COLLECT BLOOD FROM PICC: CPT | Performed by: STUDENT IN AN ORGANIZED HEALTH CARE EDUCATION/TRAINING PROGRAM

## 2021-08-27 PROCEDURE — 250N000011 HC RX IP 250 OP 636: Performed by: NURSE PRACTITIONER

## 2021-08-27 PROCEDURE — 97116 GAIT TRAINING THERAPY: CPT | Mod: GP | Performed by: REHABILITATION PRACTITIONER

## 2021-08-27 RX ORDER — POTASSIUM CHLORIDE 750 MG/1
10 TABLET, EXTENDED RELEASE ORAL ONCE
Status: COMPLETED | OUTPATIENT
Start: 2021-08-27 | End: 2021-08-27

## 2021-08-27 RX ORDER — MAGNESIUM OXIDE 400 MG/1
400 TABLET ORAL 2 TIMES DAILY
Status: COMPLETED | OUTPATIENT
Start: 2021-08-27 | End: 2021-08-28

## 2021-08-27 RX ADMIN — URSODIOL 300 MG: 300 CAPSULE ORAL at 20:25

## 2021-08-27 RX ADMIN — MAGNESIUM OXIDE TAB 400 MG (241.3 MG ELEMENTAL MG) 400 MG: 400 (241.3 MG) TAB at 20:25

## 2021-08-27 RX ADMIN — URSODIOL 300 MG: 300 CAPSULE ORAL at 09:00

## 2021-08-27 RX ADMIN — OXYCODONE HYDROCHLORIDE 5 MG: 5 TABLET ORAL at 04:10

## 2021-08-27 RX ADMIN — MAGNESIUM OXIDE TAB 400 MG (241.3 MG ELEMENTAL MG) 400 MG: 400 (241.3 MG) TAB at 09:00

## 2021-08-27 RX ADMIN — LACTULOSE 20 G: 10 POWDER, FOR SOLUTION ORAL at 20:24

## 2021-08-27 RX ADMIN — Medication 15 ML: at 20:31

## 2021-08-27 RX ADMIN — LACTULOSE 20 G: 10 POWDER, FOR SOLUTION ORAL at 08:59

## 2021-08-27 RX ADMIN — POTASSIUM & SODIUM PHOSPHATES POWDER PACK 280-160-250 MG 1 PACKET: 280-160-250 PACK at 09:00

## 2021-08-27 RX ADMIN — POTASSIUM & SODIUM PHOSPHATES POWDER PACK 280-160-250 MG 1 PACKET: 280-160-250 PACK at 20:24

## 2021-08-27 RX ADMIN — AMIODARONE HYDROCHLORIDE 400 MG: 200 TABLET ORAL at 09:00

## 2021-08-27 RX ADMIN — AMIODARONE HYDROCHLORIDE 400 MG: 200 TABLET ORAL at 20:24

## 2021-08-27 RX ADMIN — POTASSIUM CHLORIDE 10 MEQ: 750 TABLET, EXTENDED RELEASE ORAL at 09:00

## 2021-08-27 RX ADMIN — Medication 5 ML: at 05:59

## 2021-08-27 RX ADMIN — ENOXAPARIN SODIUM 40 MG: 100 INJECTION SUBCUTANEOUS at 09:01

## 2021-08-27 ASSESSMENT — ACTIVITIES OF DAILY LIVING (ADL)
ADLS_ACUITY_SCORE: 16

## 2021-08-27 NOTE — PLAN OF CARE
PT -   Physical Therapy Discharge Summary  Reason for discharge:   All goals and outcomes met, no further needs identified   Progress towards goals:   Goals met. Pt is safe and Independent/mod Independent with functional mobility. Pt reports no concerns regarding discharge home. Educated pt in the importance of continuing functional mobility and aerobic activity during recovery period.  Pt verbalized understanding.   Recommendation(s):   No further therapy is recommended. Pt will follow up with primary MD as needed (ex: request PT orders if mobility/activity do not continue to progress as expected).

## 2021-08-27 NOTE — PROGRESS NOTES
Care Management Follow Up    Length of Stay (days): 8    Expected Discharge Date: 08/31/2021     Concerns to be Addressed: Discharge Planning     Patient plan of care discussed at interdisciplinary rounds: Yes     Anticipated Discharge Disposition: Home     Anticipated Discharge Services: Home Care  Anticipated Discharge DME: None     Patient/family educated on Medicare website which has current facility and service quality ratings: Yes  Education Provided on the Discharge Plan: Yes   Patient/Family in Agreement with the Plan: Yes     Referrals Placed by CM/SW:  Home Care  Private pay costs discussed: Not applicable    Additional Information:    Per chart review, PT/OT continues to recommend HH PT/OT upon discharge.     Writer is attempting to secure an agency.    1) [facility]- Declined. Have no availability right now.  2) [facility]- Declined. Not currently serving [facility] at this time.  3) [facility]- Declined. Cannot accept commercial insurance at this time.   4) [facility]- Out of Network.   5) [facility]- Out of Network.   6) [facility]- Out of Network.   7) [facility]- Out of Network.   8) [facility]- Out of Network.   9) [facility]- Out of Network.   10) [facility]- Declined. Only accepting [facility] pts.  11) [facility]-  left.  12) [facility]- Declined. Have no availability right now.     Previous home care updates:  13) [facility]- they only provide RN. No PT/OT.  14) [facility]-  left.  15) [facility]- Declined. Out of Network.    Writer attempted to meet with patient to discuss above, however patient was not in her room.     RNCC will follow.    Malia Graves, RN, BSN, PHN  Care Coordinator   P: [phone], [facility]

## 2021-08-27 NOTE — PROGRESS NOTES
[facility]    Progress Note - Cassie 4 Service        Date of Admission:  8/18/2021    Assessment & Plan        Berta AYE Nava is a 55 year old female with 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 She presented in stable condition from home for potential liver transplant. Patient was taken to the OR. Intraop, abdomen was opened, ascites was evacuated and falciform and left triangular ligament divided. At this time, patient developed vtach requiring 5-6 rounds of debrillation and the procedure was aborted. Consideration for starting ECMO, but patient converted out. Patient incisions closed. Taken to CT for concern for PE. CT showed no signs of PE. Initially admitted to SICU due to unstable hemodynamics on 3 pressors. Transferred to MICU 8/22 after coming of pressors. Transferred to floor 8/23.    Today:  - Given recurrent leukocytosis, will repeat BCx x 2 and ascitic studies (cell count, stain, Cx, protein, and albumin). Discussed with ID. Given her relatively chronic leukocytosis, will continue to defer ABx at this time and pursue studies as above. In agreement on plan for ERCP  - Plan for ERCP on Monday. Will give 10 of vitamin K 8/28 AM and monitor INR-- will give additional doses as needed for INR<1.5    # Hepatic Cirrhosis Secondary to PSC/UC  Patient being reevaluated for liver transplantation due to arrhythmias. Currently inactive on transplant list.  - ROMA drain x1, management per transplant surgery   -When ROMA removed, patient will need daily paracentesis to prevent leakage  - Hepatology consulted, appreciate recs  - Hold home diuretics given ROMA output and coming need for leann  - Abx U/S with doppler completed  - Continue PTA ursodiol   - Lactulose 20 BID ordered, PRN senna and miralax  - Ferritin WNL  - Plan for ERCP on Monday. Will give 10 of vitamin K 8/28 AM and  monitor INR-- will give additional doses as needed for INR<1.5    #. Intraoperative V tach requiring 4 rounds of defibrillation   #. Distributive vs hypovolemic shock, resolved   Patient had wnl stress ECHO pre-op with EF 60-65%. Intraoperative v tach requiring defibrillation, came to SICU on 3 pressors. TTE post op 8/19 with global and regional left ventricular function is normal with an EF of 60-65%. Off pressors since 8/20.  - MAP goal > 55  - Amiodarone PO taper per EP  - Cards consulted, appreciate recs  - Cronary angiogram 8/25, without disease  - May consider cardiac MRI per hepatology, ferritin normal     #. SIRS  Unclear if any infectious source. Initially thought to possibly be cholangitis but less likely per discussion with transplant hepatology. Greatest concern for an intraabdominal source. OR, blood, and sputum cultures with NGTD. EBV IgG positive but IgM not reactive. At this point low suspicion for infection.   - ID consulted  - CTM Cx NGTD  - Ascitic fluid analysis: Cx NGTD, cell counts appear reactive, gram stain w/o organisms   - Monitoring off Abx  - Given recurrent leukocytosis, will repeat BCx x 2 and ascitic studies (cell count, stain, Cx, protein, and albumin). Discussed with ID. Given her relatively chronic leukocytosis, will continue to defer ABx at this time and pursue studies as above. In agreement on plan for ERCP    #. Post Operative Abdominal Pain  - Oxycodone 5 mg q4 PRN    #. Acute blood loss anemia    S/p transfusion: 3 units intraoperative   - Goals: Hb > 8, Fibrinogen >200, INR < 2, plts > 50  - Continue to trend Hb     #. Post operative respiratory failure, resolved   - Extubated, on RA    #. GONZALO (BL Creat 0.7), resolved   - continue to monitor kidney function and urine output     #. Stress hyperglycemia, resolved  In setting of steroid administration, no longer needing insulin over past several days  - sliding scale insulin discontinued    #. Deconditioning  - PT and OT  consulted    #. Severe Malnutrition in the Context of Acute Illness  -Calorie counts ordered  -PRN ensure ordered  -Will consider Marinol      Diet: Snacks/Supplements Adult: Other; whatever patient prefers; Between Meals  Combination Diet Regular Diet Adult; 2 gm NA Diet  Calorie Counts    DVT Prophylaxis: Enoxaparin (Lovenox) SQ  Jurado Catheter: Not present  Fluids: PO  Central Lines: PRESENT  CVC TRIPLE LUMEN Right Internal jugular-Site Assessment: WDL  Code Status: Full Code      Disposition Plan   Expected discharge: >3 days recommended to TBD once transplant evaluation completed.     The patient's care was discussed with the Attending Physician, Dr. Hay.    Augustine Pena MD  [address]  Securely message with the Vocera Web Console (learn more here)  Text page via [employer] Paging/Directory  Please see sign in/sign out for up to date coverage information    Clinically Significant Risk Factors Present on Admission                ______________________________________________________________________    Interval History   NAEO. Patient reports no abdominal discomfort. She reports she has been trying to maintain a high activity level. She otherwise denies any symptoms at this time. Patient without other acute concerns this AM.    4 point ROS otherwise negative    Data reviewed today: I reviewed all medications, new labs and imaging results over the last 24 hours.    Physical Exam   Vital Signs: Temp: 98.5  F (36.9  C) Temp src: Oral BP: 93/56 Pulse: 84   Resp: 20 SpO2: 100 % O2 Device: None (Room air)    Weight: 141 lbs 14.4 oz  General: awake, sitting up  HEENT:NC/AT, scleral icterus present    Pulm/Resp: no increased work of breathing, symmetric chest expansion   CV: RRR, S1. S2 present, no murmur appreciated   Abdomen: Soft, mildly distended, non-tender, no rebound tenderness or guarding, no masses ROMA drain w/ serosanguinous fluids.    Incisions/Skin: no jaundice   MSK/Extremities: trace peripheral edema, moving all extremities, calves soft, extremities well perfused    Data   Recent Labs   Lab 08/27/21  0604 08/26/21  2238 08/26/21  2037 08/26/21  1729 08/26/21  1313 08/26/21  0639 08/25/21  0352 08/20/21  1707 08/20/21  1533   WBC 16.4*  --   --   --   --  13.3* 8.1   < >  --    HGB 9.2*  --   --   --   --  8.6* 8.7*   < > 8.3*   MCV 90  --   --   --   --  91 91   < >  --    *  --   --   --   --  131* 121*   < >  --    INR 1.95*  --   --   --   --  2.13* 2.03*   < >  --      --   --   --   --  140 138   < >  --    POTASSIUM 3.8  --  3.8  --  3.3* 3.1* 3.4   < >  --    CHLORIDE 108  --   --   --   --  112* 109   < >  --    CO2 19*  --   --   --   --  21 20   < >  --    BUN 27  --   --   --   --  24 27   < >  --    CR 0.60  --   --   --   --  0.46* 0.50*   < >  --    ANIONGAP 9  --   --   --   --  7 9   < >  --    MARIELENA 8.5  --   --   --   --  8.8 8.2*   < >  --    GLC 86 86  --  89  --  97 117*   < >  --    ALBUMIN 3.1*  --   --   --   --  3.4 3.3*   < > 4.2   PROTTOTAL 5.4*  --   --   --   --  5.4* 5.4*   < > 6.2*   BILITOTAL 4.6*  --   --   --   --  3.6* 4.7*   < > 4.5*   ALKPHOS 253*  --   --   --   --  193* 240*   < > 157*   ALT 40  --   --   --   --  31 31   < > 24   AST 52*  --   --   --   --  32 43   < > 52*   TROPONIN  --   --   --   --   --   --   --   --  0.242*    < > = values in this interval not displayed.     No results found for this or any previous visit (from the past 24 hour(s)).  Medications       amiodarone  400 mg Oral BID    Followed by     [START ON 8/31/2021] amiodarone  200 mg Oral Daily     enoxaparin ANTICOAGULANT  40 mg Subcutaneous Q24H     [Held by provider] furosemide  40 mg Oral Daily     heparin lock flush  5-20 mL Intracatheter Q24H     lactulose  20 g Oral BID     magnesium oxide  400 mg Oral BID     [START ON 8/28/2021] phytonadione  10 mg Intravenous Once     potassium & sodium phosphates  1  packet Oral or Feeding Tube BID     sodium chloride (PF)  10-40 mL Intracatheter Q8H     sodium chloride (PF)  10-40 mL Intracatheter Q8H     [Held by provider] spironolactone  100 mg Oral Daily     ursodiol  300 mg Oral BID

## 2021-08-27 NOTE — PLAN OF CARE
[id number]  VSS, on tele showing NSR. Pt complaining of generalized abdominal pain, 5 mg of oxy given. ROMA drain open to suction, drained 625 mL serosanguinous this shift. K and Ph recheck due this AM, continue with POC.

## 2021-08-27 NOTE — PROGRESS NOTES
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                  Vtach    24 hour events:   Increased output from ROMA  Subjective:  L/Q  abd pain  No fever or chills    Medications  Current Facility-Administered Medications   Medication Dose Route Frequency    amiodarone  400 mg Oral BID    Followed by    [START ON 8/31/2021] amiodarone  200 mg Oral Daily    enoxaparin ANTICOAGULANT  40 mg Subcutaneous Q24H    [Held by provider] furosemide  40 mg Oral Daily    heparin lock flush  5-20 mL Intracatheter Q24H    lactulose  20 g Oral BID    magnesium oxide  400 mg Oral BID    [START ON 8/28/2021] phytonadione  10 mg Intravenous Once    potassium & sodium phosphates  1 packet Oral or Feeding Tube BID    sodium chloride (PF)  10-40 mL Intracatheter Q8H    sodium chloride (PF)  10-40 mL Intracatheter Q8H    [Held by provider] spironolactone  100 mg Oral Daily    ursodiol  300 mg Oral BID       Review of systems  A 10-point review of systems was negative.    Examination  BP 93/56 (BP Location: Right arm)   Pulse 84   Temp 98.5  F (36.9  C) (Oral)   Resp 20   Ht 1.651 m (5' 5\")   Wt 64.4 kg (141 lb 14.4 oz)   SpO2 100%   BMI 23.61 kg/m      Constitutional: cooperative, pleasant  Eyes: Sclera icteric  Ears/nose/mouth/throat: mucus membranes moist  Neck: supple  CV: No edema  Respiratory: Unlabored breathing   Abd: Nondistended, +bs, mild b/l LQ tenderness, incisions in place, ROMA with serosanguinous output  Skin: warm, perfused, jaundiced  Neuro: AAO x 3, no asterixis      MELD-Na score: 21 at 8/27/2021  6:04 AM  MELD score: 20 at 8/27/2021  6:04 AM  Calculated from:  Serum Creatinine: 0.60 mg/dL (Using min of 1 mg/dL) at 8/27/2021  6:04 AM  Serum Sodium: 136 mmol/L at 8/27/2021  6:04 AM  Total Bilirubin: 4.6 mg/dL at 8/27/2021  6:04 AM  INR(ratio): 1.95 at 8/27/2021  6:04 AM  Age: 55 years    Radiology  CT Chest 8/19/21:  IMPRESSION:   1. No evidence of " pulmonary embolism.  Contrast mixing artifact in the  left lower lobe superior segment pulmonary artery. No evidence of  right heart strain.  2. Endotracheal tube abutting the caryn.  3. Atelectasis/collapse of the posterior aspects of the bilateral  upper and lower lobes.   4. Tree in bud nodularity involving the left upper, left lower, and  the left middle ribs. Possibilities include infectious and  inflammatory etiologies. Recommend follow-up until resolution.  5. 5 mm pulmonary nodule of the right upper lobe, if the patient is  considered high risk for lung cancer consider follow-up low-dose chest  CT in 12 months.    US RUQ 8/23/21:  Impression:   1.  Cirrhotic liver with retrograde flow in the left portal vein  suggestive of portal hypertension. No evidence for portal venous  thrombosis.  2.  Mild ascites.      Assessment  55 year old with past medical history of hypertension, microscopic otitis, ulcerative colitis, pancreatic mucinous cystadenoma, PSC with cirrhosis who presented for liver transplantation but went into V. tach and surgery was aborted.  Patient was requiring pressor support and was transferred to ICU, now recovering on the floor    PSC cirrhosis meld 21  Patient was scheduled for ERCP the day of her aborted transplant surgery.  She has been requiring ERCP every 4 weeks with stent placement as bridge to liver transplantation.  Plan for potential ERCP on Monday.  -- Plan for ERCP on Monday, n.p.o. midnight on Sunday  -- Recommend to give vitamin K 10 mg once tomorrow   -- Repeat INR daily for next 3 days  -- Lactulose PO, or Miralax  -- Continue Ursodiol  -- Monitor transaminases, bilirubin  -- Ensure sodium restriction to 2000 mg per day  -- Adequate protein diet (1.2 - 1.5g/kg/day)   - Recommend multiple meals during the day, Snacks and shakes during the day/night   - Can use Ensure/Boost drinks TID  --PT/OT    Intra-op VT   Patient has been extubated and not on pressors, her clinical course  is more consistent with V. tach causing instability, less likely to be cholangitis given remarkable improvement.  Pt had cardiology evaluation with recommendation of  cardiac cath which was negative for any obstructive coronary arterial disease. Transplant surgery has been involved and wants to rule out cardiac siderosis as possible cause of her V. Tach.  Her ferritin was not elevated, which points against siderosis.    Intra-abdominal ROMA drain  Patient has been requiring weekly paracentesis, now she has ROMA drain after her reported transplant surgery, which likely will continue to have increase output secondary to her history of cirrhosis and ascites. With the drain, she is at risk of developing SBP. She may require paracentesis every day to avoid fluid leakage if plans to remove ROMA drain by transplant surgery.  -- Hold diuretics  -- Monitor intake and output    Leukocytosis  Patient has slight increase in her leukocyte count, no fevers, with her ROMA drain she is at high risk for SBP, checking fluid analysis to rule out SBP.  Infectious work-up as per primary team.  She may require antibiotics for a long term.  --Recommend ROMA fluid analysis to rule out SBP  --Recommend blood culture and infectious work-up  --Low threshold for antibiotics      Thank you for involving us in this patient's care. Please do not hesitate to contact the GI service with any questions or concerns.      Pt care plan discussed with Dr. Mao, Hepatology staff physician.    This note was created with voice recognition software, and while reviewed for accuracy, typos may remain.    Audra Fuentes MD  Advance and Transplant Hepatology Fellow  Pager: [phone]

## 2021-08-27 NOTE — PLAN OF CARE
[id number]    Pt VSS. A/Ox4.  Pt is on q4hr nueros check.On telemetry monitoring NSR. Pt denies N/V and SOB.  C/o  abdominal pain but did not want anything for pain. Pt ROMA drained a total of 1,125 of serosanguinous fluid. Potassium rechecked  and replaced 3.8,  next recheck in the morning. BG 89 and 86. Continue with POC.

## 2021-08-28 LAB
ALBUMIN SERPL-MCNC: 3.1 G/DL (ref 3.4–5)
ALP SERPL-CCNC: 241 U/L (ref 40–150)
ALT SERPL W P-5'-P-CCNC: 42 U/L (ref 0–50)
ANION GAP SERPL CALCULATED.3IONS-SCNC: 9 MMOL/L (ref 3–14)
AST SERPL W P-5'-P-CCNC: 48 U/L (ref 0–45)
BACTERIA FLD CULT: NO GROWTH
BILIRUB DIRECT SERPL-MCNC: 4.2 MG/DL (ref 0–0.2)
BILIRUB SERPL-MCNC: 6.2 MG/DL (ref 0.2–1.3)
BUN SERPL-MCNC: 20 MG/DL (ref 7–30)
CALCIUM SERPL-MCNC: 8.8 MG/DL (ref 8.5–10.1)
CHLORIDE BLD-SCNC: 106 MMOL/L (ref 94–109)
CO2 SERPL-SCNC: 19 MMOL/L (ref 20–32)
CREAT SERPL-MCNC: 0.45 MG/DL (ref 0.52–1.04)
ERYTHROCYTE [DISTWIDTH] IN BLOOD BY AUTOMATED COUNT: 16.5 % (ref 10–15)
GFR SERPL CREATININE-BSD FRML MDRD: >90 ML/MIN/1.73M2
GLUCOSE BLD-MCNC: 84 MG/DL (ref 70–99)
HCT VFR BLD AUTO: 28.1 % (ref 35–47)
HGB BLD-MCNC: 9.5 G/DL (ref 11.7–15.7)
INR PPP: 1.74 (ref 0.85–1.15)
MAGNESIUM SERPL-MCNC: 1.9 MG/DL (ref 1.6–2.3)
MCH RBC QN AUTO: 30.4 PG (ref 26.5–33)
MCHC RBC AUTO-ENTMCNC: 33.8 G/DL (ref 31.5–36.5)
MCV RBC AUTO: 90 FL (ref 78–100)
PHOSPHATE SERPL-MCNC: 3.4 MG/DL (ref 2.5–4.5)
PLATELET # BLD AUTO: 150 10E3/UL (ref 150–450)
POTASSIUM BLD-SCNC: 3.8 MMOL/L (ref 3.4–5.3)
POTASSIUM BLD-SCNC: 3.8 MMOL/L (ref 3.4–5.3)
PROT SERPL-MCNC: 5.4 G/DL (ref 6.8–8.8)
RBC # BLD AUTO: 3.13 10E6/UL (ref 3.8–5.2)
SODIUM SERPL-SCNC: 134 MMOL/L (ref 133–144)
WBC # BLD AUTO: 13.1 10E3/UL (ref 4–11)

## 2021-08-28 PROCEDURE — 85027 COMPLETE CBC AUTOMATED: CPT | Performed by: STUDENT IN AN ORGANIZED HEALTH CARE EDUCATION/TRAINING PROGRAM

## 2021-08-28 PROCEDURE — 250N000011 HC RX IP 250 OP 636: Performed by: STUDENT IN AN ORGANIZED HEALTH CARE EDUCATION/TRAINING PROGRAM

## 2021-08-28 PROCEDURE — 258N000003 HC RX IP 258 OP 636: Performed by: STUDENT IN AN ORGANIZED HEALTH CARE EDUCATION/TRAINING PROGRAM

## 2021-08-28 PROCEDURE — 84100 ASSAY OF PHOSPHORUS: CPT | Performed by: STUDENT IN AN ORGANIZED HEALTH CARE EDUCATION/TRAINING PROGRAM

## 2021-08-28 PROCEDURE — 82040 ASSAY OF SERUM ALBUMIN: CPT | Performed by: STUDENT IN AN ORGANIZED HEALTH CARE EDUCATION/TRAINING PROGRAM

## 2021-08-28 PROCEDURE — 250N000013 HC RX MED GY IP 250 OP 250 PS 637: Performed by: INTERNAL MEDICINE

## 2021-08-28 PROCEDURE — 250N000013 HC RX MED GY IP 250 OP 250 PS 637: Performed by: STUDENT IN AN ORGANIZED HEALTH CARE EDUCATION/TRAINING PROGRAM

## 2021-08-28 PROCEDURE — 83735 ASSAY OF MAGNESIUM: CPT | Performed by: STUDENT IN AN ORGANIZED HEALTH CARE EDUCATION/TRAINING PROGRAM

## 2021-08-28 PROCEDURE — 250N000011 HC RX IP 250 OP 636: Performed by: NURSE PRACTITIONER

## 2021-08-28 PROCEDURE — 99207 PR CDG-MDM COMPONENT: MEETS MODERATE - DOWN CODED: CPT | Performed by: STUDENT IN AN ORGANIZED HEALTH CARE EDUCATION/TRAINING PROGRAM

## 2021-08-28 PROCEDURE — 120N000002 HC R&B MED SURG/OB UMMC

## 2021-08-28 PROCEDURE — 250N000013 HC RX MED GY IP 250 OP 250 PS 637: Performed by: NURSE PRACTITIONER

## 2021-08-28 PROCEDURE — 36592 COLLECT BLOOD FROM PICC: CPT | Performed by: STUDENT IN AN ORGANIZED HEALTH CARE EDUCATION/TRAINING PROGRAM

## 2021-08-28 PROCEDURE — 80048 BASIC METABOLIC PNL TOTAL CA: CPT | Performed by: STUDENT IN AN ORGANIZED HEALTH CARE EDUCATION/TRAINING PROGRAM

## 2021-08-28 PROCEDURE — 85610 PROTHROMBIN TIME: CPT | Performed by: STUDENT IN AN ORGANIZED HEALTH CARE EDUCATION/TRAINING PROGRAM

## 2021-08-28 PROCEDURE — 99232 SBSQ HOSP IP/OBS MODERATE 35: CPT | Performed by: STUDENT IN AN ORGANIZED HEALTH CARE EDUCATION/TRAINING PROGRAM

## 2021-08-28 RX ADMIN — PHYTONADIONE 10 MG: 10 INJECTION, EMULSION INTRAMUSCULAR; INTRAVENOUS; SUBCUTANEOUS at 13:04

## 2021-08-28 RX ADMIN — Medication 15 ML: at 21:09

## 2021-08-28 RX ADMIN — LACTULOSE 20 G: 10 POWDER, FOR SOLUTION ORAL at 19:35

## 2021-08-28 RX ADMIN — MAGNESIUM OXIDE TAB 400 MG (241.3 MG ELEMENTAL MG) 400 MG: 400 (241.3 MG) TAB at 08:50

## 2021-08-28 RX ADMIN — AMIODARONE HYDROCHLORIDE 400 MG: 200 TABLET ORAL at 08:50

## 2021-08-28 RX ADMIN — ENOXAPARIN SODIUM 40 MG: 100 INJECTION SUBCUTANEOUS at 08:50

## 2021-08-28 RX ADMIN — URSODIOL 300 MG: 300 CAPSULE ORAL at 19:35

## 2021-08-28 RX ADMIN — OXYCODONE HYDROCHLORIDE 5 MG: 5 TABLET ORAL at 19:35

## 2021-08-28 RX ADMIN — OXYCODONE HYDROCHLORIDE 5 MG: 5 TABLET ORAL at 01:18

## 2021-08-28 RX ADMIN — LACTULOSE 20 G: 10 POWDER, FOR SOLUTION ORAL at 08:51

## 2021-08-28 RX ADMIN — MAGNESIUM OXIDE TAB 400 MG (241.3 MG ELEMENTAL MG) 400 MG: 400 (241.3 MG) TAB at 19:35

## 2021-08-28 RX ADMIN — AMIODARONE HYDROCHLORIDE 400 MG: 200 TABLET ORAL at 19:35

## 2021-08-28 RX ADMIN — URSODIOL 300 MG: 300 CAPSULE ORAL at 08:51

## 2021-08-28 ASSESSMENT — ACTIVITIES OF DAILY LIVING (ADL)
ADLS_ACUITY_SCORE: 16

## 2021-08-28 ASSESSMENT — MIFFLIN-ST. JEOR: SCORE: 1188.26

## 2021-08-28 NOTE — PLAN OF CARE
Large momo drain output.md notified.please see momo drain order q 8hrs and 500ml  drain.no c/o sob or abd discomfort for now.up aadd indy. Tele monitor cont as ordered. Abd incision c/d/I.no c/o pain.

## 2021-08-28 NOTE — PLAN OF CARE
Pt A/Ox4, VSS on RA. Up indep in halls. Pt walked x7 in halls today. PT/IT worked with pt and stated she has graduated from therapy, no longer rec home PT/OT. On 2g Na diet. RIJ triple lumen hep locked. Caps changed this jac. J/P drain with 1L out today. Pt care order to empty pouch q8, team wants staff to empty it to eliminate infection. Cultures from ascites fluid sent, pending. K (3.8), Phos (2.5) and Mag (1.8) all replaced today. Skin is jaundiced, and adb incision present from pts initial liver tx attempt. Last BM today. Plan is to do ERCP on Monday to remove J/P drain (pt will get daily leann) and have cardiac MRI on 8/31 at 12:30. SW has sent referrals looking for homecare for pt. Pt has been declined from many, but will no longer require home PT/OT per PT rec. Will continue to monitor pt and follow POC.

## 2021-08-28 NOTE — PROGRESS NOTES
[facility]    Progress Note - Cassie 4 Service        Date of Admission:  8/18/2021    Assessment & Plan        Bertaalfredo Nava is a 55 year old female with 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 She presented in stable condition from home for potential liver transplant. Patient was taken to the OR. Intraop, abdomen was opened, ascites was evacuated and falciform and left triangular ligament divided. At this time, patient developed vtach requiring 5-6 rounds of debrillation and the procedure was aborted. Consideration for starting ECMO, but patient converted out. Patient incisions closed. Taken to CT for concern for PE. CT showed no signs of PE. Initially admitted to SICU due to unstable hemodynamics on 3 pressors. Transferred to MICU 8/22 after coming of pressors. Transferred to floor 8/23.    Today:  - WBC improved and no infectious sx  - ROMA has high output discussed with transplant and hepatology plan for 500 ml Q8H for a max of 1.5 mL drained per day   - INR 1.74 will continue to monitor for FFP    # Hepatic Cirrhosis Secondary to PSC/UC  Patient being reevaluated for liver transplantation due to arrhythmias. Currently inactive on transplant list.  - ROMA drain x1, management per transplant surgery   -When ROMA removed, patient will need daily paracentesis to prevent leakage  - Hepatology consulted, appreciate recs  - Hold home diuretics given ROMA output and coming need for leann  - Abx U/S with doppler completed  - Continue PTA ursodiol   - Lactulose 20 BID ordered, PRN senna and miralax  - Ferritin WNL  - Plan for ERCP on Monday. Will give 10 of vitamin K 8/28 AM and monitor INR-- will give additional doses as needed for INR<1.5    #. Intraoperative V tach requiring 4 rounds of defibrillation   #. Distributive vs hypovolemic shock, resolved   Patient had wnl stress ECHO pre-op  with EF 60-65%. Intraoperative v tach requiring defibrillation, came to SICU on 3 pressors. TTE post op 8/19 with global and regional left ventricular function is normal with an EF of 60-65%. Off pressors since 8/20.  - MAP goal > 55  - Amiodarone PO taper per EP  - Cards consulted, appreciate recs  - Cronary angiogram 8/25, without disease  - May consider cardiac MRI per hepatology, ferritin normal     #. SIRS  Unclear if any infectious source. Initially thought to possibly be cholangitis but less likely per discussion with transplant hepatology. Greatest concern for an intraabdominal source. OR, blood, and sputum cultures with NGTD. EBV IgG positive but IgM not reactive. At this point low suspicion for infection.   - ID consulted  - CTM Cx NGTD  - Ascitic fluid analysis: Cx NGTD, cell counts appear reactive, gram stain w/o organisms   - Monitoring off Abx  - Given recurrent leukocytosis, will repeat BCx x 2 and ascitic studies (cell count, stain, Cx, protein, and albumin). Discussed with ID. Given her relatively chronic leukocytosis, will continue to defer ABx at this time and pursue studies as above. In agreement on plan for ERCP    #. Post Operative Abdominal Pain  - Oxycodone 5 mg q4 PRN    #. Acute blood loss anemia    S/p transfusion: 3 units intraoperative   - Goals: Hb > 8, Fibrinogen >200, INR < 2, plts > 50  - Continue to trend Hb     #. Post operative respiratory failure, resolved   - Extubated, on RA    #. GONZALO (BL Creat 0.7), resolved   - continue to monitor kidney function and urine output     #. Stress hyperglycemia, resolved  In setting of steroid administration, no longer needing insulin over past several days  - sliding scale insulin discontinued    #. Deconditioning  - PT and OT consulted    #. Severe Malnutrition in the Context of Acute Illness  -Calorie counts ordered  -PRN ensure ordered  -Will consider Marinol      Diet: Snacks/Supplements Adult: Other; whatever patient prefers; Between  Meals  Combination Diet Regular Diet Adult; 2 gm NA Diet  Calorie Counts    DVT Prophylaxis: Enoxaparin (Lovenox) SQ  Jurado Catheter: Not present  Fluids: PO  Central Lines: PRESENT  CVC TRIPLE LUMEN Right Internal jugular-Site Assessment: WDL  Code Status: Full Code      Disposition Plan   Expected discharge: >3 days recommended to TBD once transplant evaluation completed.     The patient's care was discussed with the Attending Physician, Dr. Hay.    Ruben Hay DO  [address]  Securely message with the Vocera Web Console (learn more here)  Text page via [employer] Paging/Directory  Please see sign in/sign out for up to date coverage information    Clinically Significant Risk Factors Present on Admission                ______________________________________________________________________    Interval History     Patient endorsed some pain in her abdomen overnight that was resolved with her ROMA drain emptied. She needed it emptied again about 30 min later but then it slowed down. She otherwise feels well and has no new sx.    4 point ROS otherwise negative    Data reviewed today: I reviewed all medications, new labs and imaging results over the last 24 hours.    Physical Exam   Vital Signs: Temp: (!) 96.3  F (35.7  C) Temp src: Oral BP: 102/62 Pulse: 88   Resp: 16 SpO2: 100 % O2 Device: None (Room air)    Weight: 132 lbs 12.8 oz  General: awake, sitting up  HEENT:NC/AT, scleral icterus present    Pulm/Resp: no increased work of breathing, symmetric chest expansion   CV: RRR, S1. S2 present, no murmur appreciated   Abdomen: Soft, mildly distended, non-tender, no rebound tenderness or guarding, no masses ROMA drain w/ serosanguinous fluids.   Incisions/Skin: no jaundice   MSK/Extremities: trace peripheral edema, moving all extremities, calves soft, extremities well perfused    Data   Recent Labs   Lab 08/28/21  0708 08/27/21  2154 08/27/21  1714  08/27/21  0604 08/26/21 2037 08/26/21  1148 08/26/21  0639   WBC 13.1*  --   --  16.4*  --   --  13.3*   HGB 9.5*  --   --  9.2*  --   --  8.6*   MCV 90  --   --  90  --   --  91     --   --  146*  --   --  131*   INR 1.74*  --   --  1.95*  --   --  2.13*     --   --  136  --   --  140   POTASSIUM 3.8  3.8  --   --  3.8 3.8   < > 3.1*   CHLORIDE 106  --   --  108  --   --  112*   CO2 19*  --   --  19*  --   --  21   BUN 20  --   --  27  --   --  24   CR 0.45*  --   --  0.60  --   --  0.46*   ANIONGAP 9  --   --  9  --   --  7   MARIELENA 8.8  --   --  8.5  --   --  8.8   GLC 84 85 69* 86  --   --  97   ALBUMIN 3.1*  --   --  3.1*  --   --  3.4   PROTTOTAL 5.4*  --   --  5.4*  --   --  5.4*   BILITOTAL 6.2*  --   --  4.6*  --   --  3.6*   ALKPHOS 241*  --   --  253*  --   --  193*   ALT 42  --   --  40  --   --  31   AST 48*  --   --  52*  --   --  32    < > = values in this interval not displayed.     No results found for this or any previous visit (from the past 24 hour(s)).  Medications       amiodarone  400 mg Oral BID    Followed by     [START ON 8/31/2021] amiodarone  200 mg Oral Daily     enoxaparin ANTICOAGULANT  40 mg Subcutaneous Q24H     [Held by provider] furosemide  40 mg Oral Daily     heparin lock flush  5-20 mL Intracatheter Q24H     lactulose  20 g Oral BID     magnesium oxide  400 mg Oral BID     phytonadione  10 mg Intravenous Once     sodium chloride (PF)  10-40 mL Intracatheter Q8H     sodium chloride (PF)  10-40 mL Intracatheter Q8H     [Held by provider] spironolactone  100 mg Oral Daily     ursodiol  300 mg Oral BID

## 2021-08-28 NOTE — PROGRESS NOTES
Calorie Count  Intake recorded for: 8/27  Total Kcals: 0 Total Protein: 0g  Kcals from Hospital Food: 0   Protein: 0g  Kcals from Outside Food (average):0 Protein: 0g  # Meals Ordered from Kitchen: 2 meals ordered.   # Meals Recorded: no intake recorded.   # Supplements Recorded: no intake recorded.

## 2021-08-28 NOTE — PLAN OF CARE
00:00-07:00 am  AF with slight soft BP but stable at baseline  OVSS  A&Ox4   Denied nausea or SOB  ROMA drain large amount 850 ml this shift.  Oxycodone 5 mg po x1 for abdominal pain with relief  Abdominal incision with stable C/D/I  Up ad indy  Voiding spontaneously well with 1 loose BM  Pt on scheduled Lactulose.  Plan is to do ERCP on Monday to remove J/P drain and have cardiac MRI on 8/31 at 12:30.  Pt is otherwise stable and no new acute events overnight   Resting/sleeping well   Continue w/POC

## 2021-08-28 NOTE — PROVIDER NOTIFICATION
Provider notification:     Dr. Hay notified at [address] [facility] paging.    Reason for notification: ROMA drain with copious amounts of output. 645 ml since 0800 and already has more. Are we ok to empty more frequently than q8h? Is it ok she's having so much out?

## 2021-08-29 LAB
ALBUMIN SERPL-MCNC: 2.8 G/DL (ref 3.4–5)
ALP SERPL-CCNC: 244 U/L (ref 40–150)
ALT SERPL W P-5'-P-CCNC: 37 U/L (ref 0–50)
ANION GAP SERPL CALCULATED.3IONS-SCNC: 9 MMOL/L (ref 3–14)
AST SERPL W P-5'-P-CCNC: 39 U/L (ref 0–45)
BILIRUB DIRECT SERPL-MCNC: 4.4 MG/DL (ref 0–0.2)
BILIRUB SERPL-MCNC: 6.2 MG/DL (ref 0.2–1.3)
BUN SERPL-MCNC: 19 MG/DL (ref 7–30)
CALCIUM SERPL-MCNC: 8.4 MG/DL (ref 8.5–10.1)
CHLORIDE BLD-SCNC: 103 MMOL/L (ref 94–109)
CO2 SERPL-SCNC: 19 MMOL/L (ref 20–32)
CREAT SERPL-MCNC: 0.53 MG/DL (ref 0.52–1.04)
ERYTHROCYTE [DISTWIDTH] IN BLOOD BY AUTOMATED COUNT: 16.7 % (ref 10–15)
GFR SERPL CREATININE-BSD FRML MDRD: >90 ML/MIN/1.73M2
GLUCOSE BLD-MCNC: 80 MG/DL (ref 70–99)
HCT VFR BLD AUTO: 26.8 % (ref 35–47)
HGB BLD-MCNC: 8.8 G/DL (ref 11.7–15.7)
INR PPP: 1.64 (ref 0.85–1.15)
MAGNESIUM SERPL-MCNC: 2 MG/DL (ref 1.6–2.3)
MCH RBC QN AUTO: 29.7 PG (ref 26.5–33)
MCHC RBC AUTO-ENTMCNC: 32.8 G/DL (ref 31.5–36.5)
MCV RBC AUTO: 91 FL (ref 78–100)
PHOSPHATE SERPL-MCNC: 3 MG/DL (ref 2.5–4.5)
PLATELET # BLD AUTO: 148 10E3/UL (ref 150–450)
POTASSIUM BLD-SCNC: 3.9 MMOL/L (ref 3.4–5.3)
PROT SERPL-MCNC: 5.3 G/DL (ref 6.8–8.8)
RBC # BLD AUTO: 2.96 10E6/UL (ref 3.8–5.2)
SODIUM SERPL-SCNC: 131 MMOL/L (ref 133–144)
WBC # BLD AUTO: 16.2 10E3/UL (ref 4–11)

## 2021-08-29 PROCEDURE — 84100 ASSAY OF PHOSPHORUS: CPT | Performed by: STUDENT IN AN ORGANIZED HEALTH CARE EDUCATION/TRAINING PROGRAM

## 2021-08-29 PROCEDURE — 250N000013 HC RX MED GY IP 250 OP 250 PS 637: Performed by: NURSE PRACTITIONER

## 2021-08-29 PROCEDURE — 250N000013 HC RX MED GY IP 250 OP 250 PS 637: Performed by: STUDENT IN AN ORGANIZED HEALTH CARE EDUCATION/TRAINING PROGRAM

## 2021-08-29 PROCEDURE — 85027 COMPLETE CBC AUTOMATED: CPT | Performed by: STUDENT IN AN ORGANIZED HEALTH CARE EDUCATION/TRAINING PROGRAM

## 2021-08-29 PROCEDURE — 36592 COLLECT BLOOD FROM PICC: CPT | Performed by: STUDENT IN AN ORGANIZED HEALTH CARE EDUCATION/TRAINING PROGRAM

## 2021-08-29 PROCEDURE — 250N000011 HC RX IP 250 OP 636: Performed by: NURSE PRACTITIONER

## 2021-08-29 PROCEDURE — 85610 PROTHROMBIN TIME: CPT | Performed by: STUDENT IN AN ORGANIZED HEALTH CARE EDUCATION/TRAINING PROGRAM

## 2021-08-29 PROCEDURE — 250N000011 HC RX IP 250 OP 636: Performed by: STUDENT IN AN ORGANIZED HEALTH CARE EDUCATION/TRAINING PROGRAM

## 2021-08-29 PROCEDURE — 99207 PR CDG-MDM COMPONENT: MEETS MODERATE - DOWN CODED: CPT | Performed by: STUDENT IN AN ORGANIZED HEALTH CARE EDUCATION/TRAINING PROGRAM

## 2021-08-29 PROCEDURE — 80048 BASIC METABOLIC PNL TOTAL CA: CPT | Performed by: STUDENT IN AN ORGANIZED HEALTH CARE EDUCATION/TRAINING PROGRAM

## 2021-08-29 PROCEDURE — 82248 BILIRUBIN DIRECT: CPT | Performed by: STUDENT IN AN ORGANIZED HEALTH CARE EDUCATION/TRAINING PROGRAM

## 2021-08-29 PROCEDURE — 83735 ASSAY OF MAGNESIUM: CPT | Performed by: STUDENT IN AN ORGANIZED HEALTH CARE EDUCATION/TRAINING PROGRAM

## 2021-08-29 PROCEDURE — 250N000013 HC RX MED GY IP 250 OP 250 PS 637: Performed by: INTERNAL MEDICINE

## 2021-08-29 PROCEDURE — 99232 SBSQ HOSP IP/OBS MODERATE 35: CPT | Mod: GC | Performed by: STUDENT IN AN ORGANIZED HEALTH CARE EDUCATION/TRAINING PROGRAM

## 2021-08-29 PROCEDURE — 120N000002 HC R&B MED SURG/OB UMMC

## 2021-08-29 RX ADMIN — URSODIOL 300 MG: 300 CAPSULE ORAL at 08:45

## 2021-08-29 RX ADMIN — OXYCODONE HYDROCHLORIDE 5 MG: 5 TABLET ORAL at 04:22

## 2021-08-29 RX ADMIN — OXYCODONE HYDROCHLORIDE 5 MG: 5 TABLET ORAL at 12:19

## 2021-08-29 RX ADMIN — LACTULOSE 20 G: 10 POWDER, FOR SOLUTION ORAL at 19:23

## 2021-08-29 RX ADMIN — AMIODARONE HYDROCHLORIDE 400 MG: 200 TABLET ORAL at 19:23

## 2021-08-29 RX ADMIN — LACTULOSE 20 G: 10 POWDER, FOR SOLUTION ORAL at 08:46

## 2021-08-29 RX ADMIN — AMIODARONE HYDROCHLORIDE 400 MG: 200 TABLET ORAL at 08:45

## 2021-08-29 RX ADMIN — Medication 15 ML: at 21:48

## 2021-08-29 RX ADMIN — ENOXAPARIN SODIUM 40 MG: 100 INJECTION SUBCUTANEOUS at 08:45

## 2021-08-29 RX ADMIN — OXYCODONE HYDROCHLORIDE 5 MG: 5 TABLET ORAL at 19:01

## 2021-08-29 RX ADMIN — URSODIOL 300 MG: 300 CAPSULE ORAL at 19:23

## 2021-08-29 RX ADMIN — Medication 5 ML: at 05:36

## 2021-08-29 ASSESSMENT — ACTIVITIES OF DAILY LIVING (ADL)
ADLS_ACUITY_SCORE: 16
ADLS_ACUITY_SCORE: 15
ADLS_ACUITY_SCORE: 16
ADLS_ACUITY_SCORE: 15
ADLS_ACUITY_SCORE: 16
ADLS_ACUITY_SCORE: 16

## 2021-08-29 ASSESSMENT — MIFFLIN-ST. JEOR: SCORE: 1175.55

## 2021-08-29 NOTE — PLAN OF CARE
00:00-07:00 am  Tmax 99.0 with stable VS   Pain in abdomen and lower back manageable with prn Oxycodone 5 mg given x1  No nausea/vomiting or SOB  Continue on Telemetry with NSR  Continue to have large output from ROMA drain 500 ml at 04:30 am but ROMA filled up 650 ml immediately and was emptied right after by accident.  Continue to empty ROMA drain 500 ml max q 8 hrs per order unless symptomatic   Abdominal incision with stables C/D/I   Pt up ad indy  Voiding spontaneously well, not saving urine with 2 loose BM d/t Lactulose  Pt A&Ox 4, resting/sleeping well, and no new acute events overnight  Continue  w/POC

## 2021-08-29 NOTE — PROGRESS NOTES
[facility]    Progress Note - Cassie 4 Service        Date of Admission:  8/18/2021    Assessment & Plan        Bertaalfredo Nava is a 55 year old female with 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 She presented in stable condition from home for potential liver transplant. Patient was taken to the OR. Intraop, abdomen was opened, ascites was evacuated and falciform and left triangular ligament divided. At this time, patient developed vtach requiring 5-6 rounds of debrillation and the procedure was aborted. Consideration for starting ECMO, but patient converted out. Patient incisions closed. Taken to CT for concern for PE. CT showed no signs of PE. Initially admitted to SICU due to unstable hemodynamics on 3 pressors. Transferred to MICU 8/22 after coming of pressors. Transferred to floor 8/23.    Today:  - Patient remains without infectious sx  - INR 1.64 will continue to monitor for consideration of FFP tomorrow AM prior to ERCP    # Hepatic Cirrhosis Secondary to PSC/UC  Patient being reevaluated for liver transplantation due to arrhythmias. Currently inactive on transplant list.  - ROMA drain x1, management per transplant surgery   -When ROMA removed, patient will need daily paracentesis to prevent leakage  - Hepatology consulted, appreciate recs  - Hold home diuretics given ROMA output and coming need for leann  - Continue PTA ursodiol   - Lactulose 20 BID ordered, PRN senna and miralax  - Plan for ERCP on Monday. Will monitor INR in AM and give FFP as needed for INR<1.5    #. Intraoperative V tach requiring 4 rounds of defibrillation   #. Distributive vs hypovolemic shock, resolved   Patient had wnl stress ECHO pre-op with EF 60-65%. Intraoperative v tach requiring defibrillation, came to SICU on 3 pressors. TTE post op 8/19 with global and regional left ventricular function is  normal with an EF of 60-65%. Off pressors since 8/20.  - MAP goal > 55  - Amiodarone PO taper per EP  - Cards consulted, appreciate recs  - Cronary angiogram 8/25, without disease  - Plan for cardiac MRI Tuesday     #. SIRS  Unclear if any infectious source. Initially thought to possibly be cholangitis but less likely per discussion with transplant hepatology. Greatest concern for an intraabdominal source. OR, blood, and sputum cultures with NGTD. EBV IgG positive but IgM not reactive. At this point low suspicion for infection.   - ID consulted  - CTM Cx NGTD  - Ascitic fluid analysis: Cx NGTD, cell counts appear reactive, gram stain w/o organisms   - Monitoring off Abx  - Given recurrent leukocytosis, will repeat BCx x 2 and ascitic studies (cell count, stain, Cx, protein, and albumin). Discussed with ID. Given her relatively chronic leukocytosis, will continue to defer ABx at this time and pursue studies as above. In agreement on plan for ERCP    #. Post Operative Abdominal Pain  - Oxycodone 5 mg q4 PRN    #. Acute blood loss anemia    S/p transfusion: 3 units intraoperative   - Goals: Hb > 8, Fibrinogen >200, INR < 2, plts > 50  - Continue to trend Hb     #. Post operative respiratory failure, resolved   - Extubated, on RA    #. GONZALO (BL Creat 0.7), resolved   - continue to monitor kidney function and urine output     #. Stress hyperglycemia, resolved  In setting of steroid administration, no longer needing insulin over past several days  - sliding scale insulin discontinued    #. Deconditioning  - PT and OT consulted    #. Severe Malnutrition in the Context of Acute Illness  -Calorie counts ordered  -PRN ensure ordered  -Will consider Marinol      Diet: Snacks/Supplements Adult: Other; whatever patient prefers; Between Meals  Combination Diet Regular Diet Adult; 2 gm NA Diet  Calorie Counts    DVT Prophylaxis: Enoxaparin (Lovenox) SQ  Jurado Catheter: Not present  Fluids: PO  Central Lines: PRESENT  CVC TRIPLE  LUMEN Right Internal jugular-Site Assessment: WDL  Code Status: Full Code      Disposition Plan   Expected discharge: >3 days recommended to TBD once transplant evaluation completed.     The patient's care was discussed with the Attending Physician, Dr. Hay.    Augustine Pena MD  [address]  Securely message with the Vocera Web Console (learn more here)  Text page via [facility] Paging/Directory  Please see sign in/sign out for up to date coverage information    Clinically Significant Risk Factors Present on Admission                ______________________________________________________________________    Interval History   NAEO. Patient continues to report some mild back and abdominal pain but denies any acute pain or other symptoms. Patient is planning to see family members today for her birthday and is excited. She reports some annoyance with ambulation when she has not been drained for a while.     4 point ROS otherwise negative    Data reviewed today: I reviewed all medications, new labs and imaging results over the last 24 hours.    Physical Exam   Vital Signs: Temp: 98.3  F (36.8  C) Temp src: Oral BP: 103/58 Pulse: 85   Resp: 16 SpO2: 98 % O2 Device: None (Room air)    Weight: 132 lbs 12.8 oz  General: awake, sitting up  HEENT:NC/AT, scleral icterus present    Pulm/Resp: no increased work of breathing, symmetric chest expansion   CV: RRR, S1. S2 present, no murmur appreciated   Abdomen: Soft, mildly distended, non-tender, no rebound tenderness or guarding, no masses ROMA drain w/ serosanguinous fluids.   Incisions/Skin: no jaundice   MSK/Extremities: trace peripheral edema, moving all extremities, calves soft, extremities well perfused    Data   Recent Labs   Lab 08/29/21  0542 08/28/21  0708 08/27/21  2154 08/27/21  0604   WBC 16.2* 13.1*  --  16.4*   HGB 8.8* 9.5*  --  9.2*   MCV 91 90  --  90   * 150  --  146*   INR 1.64* 1.74*  --  1.95*    * 134  --  136   POTASSIUM 3.9 3.8  3.8  --  3.8   CHLORIDE 103 106  --  108   CO2 19* 19*  --  19*   BUN 19 20  --  27   CR 0.53 0.45*  --  0.60   ANIONGAP 9 9  --  9   MARIELENA 8.4* 8.8  --  8.5   GLC 80 84 85 86   ALBUMIN 2.8* 3.1*  --  3.1*   PROTTOTAL 5.3* 5.4*  --  5.4*   BILITOTAL 6.2* 6.2*  --  4.6*   ALKPHOS 244* 241*  --  253*   ALT 37 42  --  40   AST 39 48*  --  52*     No results found for this or any previous visit (from the past 24 hour(s)).  Medications       amiodarone  400 mg Oral BID    Followed by     [START ON 8/31/2021] amiodarone  200 mg Oral Daily     enoxaparin ANTICOAGULANT  40 mg Subcutaneous Q24H     [Held by provider] furosemide  40 mg Oral Daily     heparin lock flush  5-20 mL Intracatheter Q24H     lactulose  20 g Oral BID     sodium chloride (PF)  10-40 mL Intracatheter Q8H     sodium chloride (PF)  10-40 mL Intracatheter Q8H     [Held by provider] spironolactone  100 mg Oral Daily     ursodiol  300 mg Oral BID

## 2021-08-29 NOTE — PROGRESS NOTES
Calorie Count  Intake recorded for: 8/28  Total Kcals: 0 Total Protein: 0g  Kcals from Hospital Food: 0   Protein: 0g  Kcals from Outside Food (average):0 Protein: 0g  # Meals Ordered from Kitchen: 2 meals ordered  # Meals Recorded: no intake recorded  # Supplements Recorded: no intake recorded

## 2021-08-29 NOTE — PLAN OF CARE
[id number]     Pt VSS. A/Ox4.  Pt denies N/V and SOB. C/o abdominal pain PRN oxycodone given with some relief. Pt ROMA is to be drained q8hrs, total of 500cc. Pt is SBA continue with POC.

## 2021-08-29 NOTE — PLAN OF CARE
Momo drain 300ml at 1230. Nextdrain due at 2030.oxy for abd pain.good pain relief with pain meds. Npo after midnight for procedure in am.afeb.vss. momo drain dressing changed today clear/brown drainage from incision site.

## 2021-08-30 ENCOUNTER — ANESTHESIA EVENT (OUTPATIENT)
Dept: SURGERY | Facility: CLINIC | Age: 56
End: 2021-08-30

## 2021-08-30 ENCOUNTER — ANESTHESIA (OUTPATIENT)
Dept: SURGERY | Facility: CLINIC | Age: 56
End: 2021-08-30

## 2021-08-30 ENCOUNTER — APPOINTMENT (OUTPATIENT)
Dept: GENERAL RADIOLOGY | Facility: CLINIC | Age: 56
DRG: 420 | End: 2021-08-30
Attending: STUDENT IN AN ORGANIZED HEALTH CARE EDUCATION/TRAINING PROGRAM
Payer: COMMERCIAL

## 2021-08-30 LAB
ALBUMIN FLD-MCNC: 0.5 G/DL
ALBUMIN SERPL-MCNC: 3 G/DL (ref 3.4–5)
ALP SERPL-CCNC: 276 U/L (ref 40–150)
ALT SERPL W P-5'-P-CCNC: 36 U/L (ref 0–50)
ANION GAP SERPL CALCULATED.3IONS-SCNC: 12 MMOL/L (ref 3–14)
APPEARANCE FLD: ABNORMAL
AST SERPL W P-5'-P-CCNC: 37 U/L (ref 0–45)
BILIRUB DIRECT SERPL-MCNC: 4.8 MG/DL (ref 0–0.2)
BILIRUB SERPL-MCNC: 6.7 MG/DL (ref 0.2–1.3)
BLD PROD TYP BPU: NORMAL
BLOOD COMPONENT TYPE: NORMAL
BUN SERPL-MCNC: 21 MG/DL (ref 7–30)
CALCIUM SERPL-MCNC: 9.2 MG/DL (ref 8.5–10.1)
CHLORIDE BLD-SCNC: 103 MMOL/L (ref 94–109)
CO2 SERPL-SCNC: 16 MMOL/L (ref 20–32)
CODING SYSTEM: NORMAL
COLOR FLD: YELLOW
CREAT SERPL-MCNC: 0.54 MG/DL (ref 0.52–1.04)
ERYTHROCYTE [DISTWIDTH] IN BLOOD BY AUTOMATED COUNT: 16.7 % (ref 10–15)
GFR SERPL CREATININE-BSD FRML MDRD: >90 ML/MIN/1.73M2
GLUCOSE BLD-MCNC: 100 MG/DL (ref 70–99)
GRAM STAIN RESULT: NORMAL
GRAM STAIN RESULT: NORMAL
HCT VFR BLD AUTO: 28.7 % (ref 35–47)
HGB BLD-MCNC: 9.6 G/DL (ref 11.7–15.7)
INR PPP: 1.61 (ref 0.85–1.15)
ISSUE DATE AND TIME: NORMAL
LYMPHOCYTES NFR FLD MANUAL: 8 %
MAGNESIUM SERPL-MCNC: 2.2 MG/DL (ref 1.6–2.3)
MCH RBC QN AUTO: 29.9 PG (ref 26.5–33)
MCHC RBC AUTO-ENTMCNC: 33.4 G/DL (ref 31.5–36.5)
MCV RBC AUTO: 89 FL (ref 78–100)
MONOS+MACROS NFR FLD MANUAL: 89 %
NEUTS BAND NFR FLD MANUAL: 3 %
PHOSPHATE SERPL-MCNC: 3.3 MG/DL (ref 2.5–4.5)
PLATELET # BLD AUTO: 193 10E3/UL (ref 150–450)
POTASSIUM BLD-SCNC: 3.5 MMOL/L (ref 3.4–5.3)
PROT FLD-MCNC: 1.1 G/DL
PROT SERPL-MCNC: 5.6 G/DL (ref 6.8–8.8)
RBC # BLD AUTO: 3.21 10E6/UL (ref 3.8–5.2)
SODIUM SERPL-SCNC: 131 MMOL/L (ref 133–144)
UNIT ABO/RH: NORMAL
UNIT NUMBER: NORMAL
UNIT STATUS: NORMAL
UNIT TYPE ISBT: 8400
WBC # BLD AUTO: 21.1 10E3/UL (ref 4–11)
WBC # FLD AUTO: 141 /UL

## 2021-08-30 PROCEDURE — 87186 SC STD MICRODIL/AGAR DIL: CPT | Performed by: STUDENT IN AN ORGANIZED HEALTH CARE EDUCATION/TRAINING PROGRAM

## 2021-08-30 PROCEDURE — 250N000025 HC SEVOFLURANE, PER MIN: Performed by: INTERNAL MEDICINE

## 2021-08-30 PROCEDURE — 80053 COMPREHEN METABOLIC PANEL: CPT | Performed by: STUDENT IN AN ORGANIZED HEALTH CARE EDUCATION/TRAINING PROGRAM

## 2021-08-30 PROCEDURE — 258N000003 HC RX IP 258 OP 636: Performed by: NURSE ANESTHETIST, CERTIFIED REGISTERED

## 2021-08-30 PROCEDURE — 85027 COMPLETE CBC AUTOMATED: CPT | Performed by: STUDENT IN AN ORGANIZED HEALTH CARE EDUCATION/TRAINING PROGRAM

## 2021-08-30 PROCEDURE — 710N000010 HC RECOVERY PHASE 1, LEVEL 2, PER MIN: Performed by: INTERNAL MEDICINE

## 2021-08-30 PROCEDURE — 83735 ASSAY OF MAGNESIUM: CPT | Performed by: STUDENT IN AN ORGANIZED HEALTH CARE EDUCATION/TRAINING PROGRAM

## 2021-08-30 PROCEDURE — 82042 OTHER SOURCE ALBUMIN QUAN EA: CPT | Performed by: STUDENT IN AN ORGANIZED HEALTH CARE EDUCATION/TRAINING PROGRAM

## 2021-08-30 PROCEDURE — 255N000002 HC RX 255 OP 636: Performed by: INTERNAL MEDICINE

## 2021-08-30 PROCEDURE — 85610 PROTHROMBIN TIME: CPT | Performed by: STUDENT IN AN ORGANIZED HEALTH CARE EDUCATION/TRAINING PROGRAM

## 2021-08-30 PROCEDURE — 89051 BODY FLUID CELL COUNT: CPT | Performed by: STUDENT IN AN ORGANIZED HEALTH CARE EDUCATION/TRAINING PROGRAM

## 2021-08-30 PROCEDURE — 0FC58ZZ EXTIRPATION OF MATTER FROM RIGHT HEPATIC DUCT, VIA NATURAL OR ARTIFICIAL OPENING ENDOSCOPIC: ICD-10-PCS | Performed by: INTERNAL MEDICINE

## 2021-08-30 PROCEDURE — 250N000011 HC RX IP 250 OP 636: Performed by: STUDENT IN AN ORGANIZED HEALTH CARE EDUCATION/TRAINING PROGRAM

## 2021-08-30 PROCEDURE — 82248 BILIRUBIN DIRECT: CPT | Performed by: STUDENT IN AN ORGANIZED HEALTH CARE EDUCATION/TRAINING PROGRAM

## 2021-08-30 PROCEDURE — 360N000082 HC SURGERY LEVEL 2 W/ FLUORO, PER MIN: Performed by: INTERNAL MEDICINE

## 2021-08-30 PROCEDURE — C1769 GUIDE WIRE: HCPCS | Performed by: INTERNAL MEDICINE

## 2021-08-30 PROCEDURE — C1726 CATH, BAL DIL, NON-VASCULAR: HCPCS | Performed by: INTERNAL MEDICINE

## 2021-08-30 PROCEDURE — 84100 ASSAY OF PHOSPHORUS: CPT | Performed by: STUDENT IN AN ORGANIZED HEALTH CARE EDUCATION/TRAINING PROGRAM

## 2021-08-30 PROCEDURE — 0FC98ZZ EXTIRPATION OF MATTER FROM COMMON BILE DUCT, VIA NATURAL OR ARTIFICIAL OPENING ENDOSCOPIC: ICD-10-PCS | Performed by: INTERNAL MEDICINE

## 2021-08-30 PROCEDURE — 250N000011 HC RX IP 250 OP 636: Performed by: NURSE PRACTITIONER

## 2021-08-30 PROCEDURE — P9059 PLASMA, FRZ BETWEEN 8-24HOUR: HCPCS | Performed by: STUDENT IN AN ORGANIZED HEALTH CARE EDUCATION/TRAINING PROGRAM

## 2021-08-30 PROCEDURE — P9041 ALBUMIN (HUMAN),5%, 50ML: HCPCS | Performed by: STUDENT IN AN ORGANIZED HEALTH CARE EDUCATION/TRAINING PROGRAM

## 2021-08-30 PROCEDURE — 370N000017 HC ANESTHESIA TECHNICAL FEE, PER MIN: Performed by: INTERNAL MEDICINE

## 2021-08-30 PROCEDURE — 999N000181 XR SURGERY CARM FLUORO GREATER THAN 5 MIN W STILLS: Mod: TC

## 2021-08-30 PROCEDURE — 250N000013 HC RX MED GY IP 250 OP 250 PS 637: Performed by: NURSE PRACTITIONER

## 2021-08-30 PROCEDURE — 120N000002 HC R&B MED SURG/OB UMMC

## 2021-08-30 PROCEDURE — 87205 SMEAR GRAM STAIN: CPT | Performed by: STUDENT IN AN ORGANIZED HEALTH CARE EDUCATION/TRAINING PROGRAM

## 2021-08-30 PROCEDURE — 84157 ASSAY OF PROTEIN OTHER: CPT | Performed by: STUDENT IN AN ORGANIZED HEALTH CARE EDUCATION/TRAINING PROGRAM

## 2021-08-30 PROCEDURE — 89050 BODY FLUID CELL COUNT: CPT | Performed by: STUDENT IN AN ORGANIZED HEALTH CARE EDUCATION/TRAINING PROGRAM

## 2021-08-30 PROCEDURE — 99207 PR CDG-MDM COMPONENT: MEETS MODERATE - DOWN CODED: CPT | Performed by: STUDENT IN AN ORGANIZED HEALTH CARE EDUCATION/TRAINING PROGRAM

## 2021-08-30 PROCEDURE — 272N000001 HC OR GENERAL SUPPLY STERILE: Performed by: INTERNAL MEDICINE

## 2021-08-30 PROCEDURE — 250N000009 HC RX 250: Performed by: NURSE ANESTHETIST, CERTIFIED REGISTERED

## 2021-08-30 PROCEDURE — 999N000141 HC STATISTIC PRE-PROCEDURE NURSING ASSESSMENT: Performed by: INTERNAL MEDICINE

## 2021-08-30 PROCEDURE — 250N000009 HC RX 250: Performed by: INTERNAL MEDICINE

## 2021-08-30 PROCEDURE — 250N000013 HC RX MED GY IP 250 OP 250 PS 637: Performed by: STUDENT IN AN ORGANIZED HEALTH CARE EDUCATION/TRAINING PROGRAM

## 2021-08-30 PROCEDURE — 99232 SBSQ HOSP IP/OBS MODERATE 35: CPT | Performed by: STUDENT IN AN ORGANIZED HEALTH CARE EDUCATION/TRAINING PROGRAM

## 2021-08-30 PROCEDURE — 250N000011 HC RX IP 250 OP 636: Performed by: NURSE ANESTHETIST, CERTIFIED REGISTERED

## 2021-08-30 PROCEDURE — 250N000013 HC RX MED GY IP 250 OP 250 PS 637: Performed by: INTERNAL MEDICINE

## 2021-08-30 PROCEDURE — 0FPB8DZ REMOVAL OF INTRALUMINAL DEVICE FROM HEPATOBILIARY DUCT, VIA NATURAL OR ARTIFICIAL OPENING ENDOSCOPIC: ICD-10-PCS | Performed by: INTERNAL MEDICINE

## 2021-08-30 PROCEDURE — C1876 STENT, NON-COA/NON-COV W/DEL: HCPCS | Performed by: INTERNAL MEDICINE

## 2021-08-30 PROCEDURE — 36592 COLLECT BLOOD FROM PICC: CPT | Performed by: STUDENT IN AN ORGANIZED HEALTH CARE EDUCATION/TRAINING PROGRAM

## 2021-08-30 DEVICE — STENT JOHLIN PANCREA WEDGE 10FRX22CM W/INTRO G26828
Type: IMPLANTABLE DEVICE | Site: BILE DUCT | Status: NON-FUNCTIONAL
Removed: 2021-10-01

## 2021-08-30 RX ORDER — LEVOFLOXACIN 5 MG/ML
INJECTION, SOLUTION INTRAVENOUS PRN
Status: DISCONTINUED | OUTPATIENT
Start: 2021-08-30 | End: 2021-08-30

## 2021-08-30 RX ORDER — SODIUM CHLORIDE, SODIUM LACTATE, POTASSIUM CHLORIDE, CALCIUM CHLORIDE 600; 310; 30; 20 MG/100ML; MG/100ML; MG/100ML; MG/100ML
INJECTION, SOLUTION INTRAVENOUS CONTINUOUS
Status: DISCONTINUED | OUTPATIENT
Start: 2021-08-30 | End: 2021-08-30 | Stop reason: HOSPADM

## 2021-08-30 RX ORDER — LIDOCAINE 40 MG/G
CREAM TOPICAL
Status: DISCONTINUED | OUTPATIENT
Start: 2021-08-30 | End: 2021-08-30 | Stop reason: HOSPADM

## 2021-08-30 RX ORDER — ALBUMIN, HUMAN INJ 5% 5 %
25 SOLUTION INTRAVENOUS DAILY
Status: DISCONTINUED | OUTPATIENT
Start: 2021-08-30 | End: 2021-09-01

## 2021-08-30 RX ORDER — INDOMETHACIN 50 MG/1
100 SUPPOSITORY RECTAL
Status: DISCONTINUED | OUTPATIENT
Start: 2021-08-30 | End: 2021-08-30 | Stop reason: HOSPADM

## 2021-08-30 RX ORDER — EPHEDRINE SULFATE 50 MG/ML
INJECTION, SOLUTION INTRAMUSCULAR; INTRAVENOUS; SUBCUTANEOUS PRN
Status: DISCONTINUED | OUTPATIENT
Start: 2021-08-30 | End: 2021-08-30

## 2021-08-30 RX ORDER — ONDANSETRON 4 MG/1
4 TABLET, ORALLY DISINTEGRATING ORAL EVERY 30 MIN PRN
Status: DISCONTINUED | OUTPATIENT
Start: 2021-08-30 | End: 2021-08-30 | Stop reason: HOSPADM

## 2021-08-30 RX ORDER — ONDANSETRON 2 MG/ML
4 INJECTION INTRAMUSCULAR; INTRAVENOUS EVERY 30 MIN PRN
Status: DISCONTINUED | OUTPATIENT
Start: 2021-08-30 | End: 2021-08-30 | Stop reason: HOSPADM

## 2021-08-30 RX ORDER — IOPAMIDOL 510 MG/ML
INJECTION, SOLUTION INTRAVASCULAR PRN
Status: DISCONTINUED | OUTPATIENT
Start: 2021-08-30 | End: 2021-08-30 | Stop reason: HOSPADM

## 2021-08-30 RX ORDER — FENTANYL CITRATE 50 UG/ML
25 INJECTION, SOLUTION INTRAMUSCULAR; INTRAVENOUS EVERY 5 MIN PRN
Status: DISCONTINUED | OUTPATIENT
Start: 2021-08-30 | End: 2021-08-30 | Stop reason: HOSPADM

## 2021-08-30 RX ORDER — OXYCODONE HYDROCHLORIDE 5 MG/1
5 TABLET ORAL EVERY 4 HOURS PRN
Status: DISCONTINUED | OUTPATIENT
Start: 2021-08-30 | End: 2021-08-30 | Stop reason: HOSPADM

## 2021-08-30 RX ORDER — INDOMETHACIN 50 MG/1
SUPPOSITORY RECTAL PRN
Status: DISCONTINUED | OUTPATIENT
Start: 2021-08-30 | End: 2021-08-30 | Stop reason: HOSPADM

## 2021-08-30 RX ORDER — FENTANYL CITRATE 50 UG/ML
INJECTION, SOLUTION INTRAMUSCULAR; INTRAVENOUS PRN
Status: DISCONTINUED | OUTPATIENT
Start: 2021-08-30 | End: 2021-08-30

## 2021-08-30 RX ORDER — ONDANSETRON 4 MG/1
4 TABLET, ORALLY DISINTEGRATING ORAL EVERY 6 HOURS PRN
Status: DISCONTINUED | OUTPATIENT
Start: 2021-08-30 | End: 2021-09-09 | Stop reason: HOSPADM

## 2021-08-30 RX ORDER — PROPOFOL 10 MG/ML
INJECTION, EMULSION INTRAVENOUS PRN
Status: DISCONTINUED | OUTPATIENT
Start: 2021-08-30 | End: 2021-08-30

## 2021-08-30 RX ORDER — HYDRALAZINE HYDROCHLORIDE 20 MG/ML
2.5-5 INJECTION INTRAMUSCULAR; INTRAVENOUS EVERY 10 MIN PRN
Status: DISCONTINUED | OUTPATIENT
Start: 2021-08-30 | End: 2021-08-30 | Stop reason: HOSPADM

## 2021-08-30 RX ORDER — DEXAMETHASONE SODIUM PHOSPHATE 4 MG/ML
INJECTION, SOLUTION INTRA-ARTICULAR; INTRALESIONAL; INTRAMUSCULAR; INTRAVENOUS; SOFT TISSUE PRN
Status: DISCONTINUED | OUTPATIENT
Start: 2021-08-30 | End: 2021-08-30

## 2021-08-30 RX ORDER — SODIUM CHLORIDE 9 MG/ML
INJECTION, SOLUTION INTRAVENOUS CONTINUOUS PRN
Status: DISCONTINUED | OUTPATIENT
Start: 2021-08-30 | End: 2021-08-30

## 2021-08-30 RX ORDER — LIDOCAINE HYDROCHLORIDE 20 MG/ML
INJECTION, SOLUTION INFILTRATION; PERINEURAL PRN
Status: DISCONTINUED | OUTPATIENT
Start: 2021-08-30 | End: 2021-08-30

## 2021-08-30 RX ORDER — ONDANSETRON 2 MG/ML
INJECTION INTRAMUSCULAR; INTRAVENOUS PRN
Status: DISCONTINUED | OUTPATIENT
Start: 2021-08-30 | End: 2021-08-30

## 2021-08-30 RX ORDER — FLUMAZENIL 0.1 MG/ML
0.2 INJECTION, SOLUTION INTRAVENOUS
Status: ACTIVE | OUTPATIENT
Start: 2021-08-30 | End: 2021-08-30

## 2021-08-30 RX ORDER — ONDANSETRON 2 MG/ML
4 INJECTION INTRAMUSCULAR; INTRAVENOUS EVERY 6 HOURS PRN
Status: DISCONTINUED | OUTPATIENT
Start: 2021-08-30 | End: 2021-09-09 | Stop reason: HOSPADM

## 2021-08-30 RX ADMIN — ROCURONIUM BROMIDE 50 MG: 10 INJECTION INTRAVENOUS at 09:23

## 2021-08-30 RX ADMIN — Medication 5 ML: at 05:45

## 2021-08-30 RX ADMIN — SUGAMMADEX 200 MG: 100 INJECTION, SOLUTION INTRAVENOUS at 10:20

## 2021-08-30 RX ADMIN — Medication 5 MG: at 09:26

## 2021-08-30 RX ADMIN — Medication 5 ML: at 21:16

## 2021-08-30 RX ADMIN — AMIODARONE HYDROCHLORIDE 400 MG: 200 TABLET ORAL at 20:23

## 2021-08-30 RX ADMIN — FENTANYL CITRATE 100 MCG: 50 INJECTION, SOLUTION INTRAMUSCULAR; INTRAVENOUS at 09:20

## 2021-08-30 RX ADMIN — LACTULOSE 20 G: 10 POWDER, FOR SOLUTION ORAL at 20:23

## 2021-08-30 RX ADMIN — LIDOCAINE HYDROCHLORIDE 60 MG: 20 INJECTION, SOLUTION INFILTRATION; PERINEURAL at 09:19

## 2021-08-30 RX ADMIN — LEVOFLOXACIN 500 MG: 5 INJECTION, SOLUTION INTRAVENOUS at 09:35

## 2021-08-30 RX ADMIN — LACTULOSE 20 G: 10 POWDER, FOR SOLUTION ORAL at 12:22

## 2021-08-30 RX ADMIN — PROPOFOL 100 MG: 10 INJECTION, EMULSION INTRAVENOUS at 09:20

## 2021-08-30 RX ADMIN — ENOXAPARIN SODIUM 40 MG: 100 INJECTION SUBCUTANEOUS at 12:22

## 2021-08-30 RX ADMIN — AMIODARONE HYDROCHLORIDE 400 MG: 200 TABLET ORAL at 12:22

## 2021-08-30 RX ADMIN — Medication 10 ML: at 22:27

## 2021-08-30 RX ADMIN — SODIUM CHLORIDE: 9 INJECTION, SOLUTION INTRAVENOUS at 09:07

## 2021-08-30 RX ADMIN — OXYCODONE HYDROCHLORIDE 5 MG: 5 TABLET ORAL at 02:42

## 2021-08-30 RX ADMIN — DEXAMETHASONE SODIUM PHOSPHATE 4 MG: 4 INJECTION, SOLUTION INTRA-ARTICULAR; INTRALESIONAL; INTRAMUSCULAR; INTRAVENOUS; SOFT TISSUE at 09:30

## 2021-08-30 RX ADMIN — ALBUMIN HUMAN 25 G: 0.05 INJECTION, SOLUTION INTRAVENOUS at 16:53

## 2021-08-30 RX ADMIN — URSODIOL 300 MG: 300 CAPSULE ORAL at 12:23

## 2021-08-30 RX ADMIN — ONDANSETRON 4 MG: 2 INJECTION INTRAMUSCULAR; INTRAVENOUS at 09:30

## 2021-08-30 RX ADMIN — URSODIOL 300 MG: 300 CAPSULE ORAL at 20:23

## 2021-08-30 ASSESSMENT — MIFFLIN-ST. JEOR: SCORE: 1152.41

## 2021-08-30 ASSESSMENT — ACTIVITIES OF DAILY LIVING (ADL)
ADLS_ACUITY_SCORE: 16
ADLS_ACUITY_SCORE: 14
ADLS_ACUITY_SCORE: 15
ADLS_ACUITY_SCORE: 16

## 2021-08-30 ASSESSMENT — ENCOUNTER SYMPTOMS: DYSRHYTHMIAS: 1

## 2021-08-30 NOTE — PROGRESS NOTES
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                  Vtach                  Leukocytosis    24 hour events:   Underwent ERCP today  Subjective:  C/o leakage around the ROMA insertion site    Medications  Current Facility-Administered Medications   Medication Dose Route Frequency     [Auto Hold] amiodarone  400 mg Oral BID    Followed by     [Auto Hold] amiodarone  200 mg Oral Daily     [Auto Hold] enoxaparin ANTICOAGULANT  40 mg Subcutaneous Q24H     [Held by provider] furosemide  40 mg Oral Daily     [Auto Hold] heparin lock flush  5-20 mL Intracatheter Q24H     [Auto Hold] lactulose  20 g Oral BID     [Auto Hold] sodium chloride (PF)  10-40 mL Intracatheter Q8H     [Auto Hold] sodium chloride (PF)  10-40 mL Intracatheter Q8H     [Held by provider] spironolactone  100 mg Oral Daily     [Auto Hold] ursodiol  300 mg Oral BID       Review of systems  A 10-point review of systems was negative.    Examination  BP (!) 143/92   Pulse 99   Temp 97.8  F (36.6  C) (Oral)   Resp (!) 121   Ht 1.651 m (5' 5\")   Wt 57.2 kg (126 lb)   SpO2 93%   BMI 20.97 kg/m      Constitutional: cooperative, pleasant  Eyes: Sclera icteric  Ears/nose/mouth/throat: mucus membranes moist  Neck: supple  CV: No edema  Respiratory: Unlabored breathing   Abd: Nondistended, +bs, mild b/l LQ tenderness, surgical incisions in place, ROMA with increased output  Skin: warm, perfused, jaundiced  Neuro: AAO x 3, no asterixis      MELD-Na score: 23 at 8/30/2021  5:49 AM  MELD score: 19 at 8/30/2021  5:49 AM  Calculated from:  Serum Creatinine: 0.54 mg/dL (Using min of 1 mg/dL) at 8/30/2021  5:49 AM  Serum Sodium: 131 mmol/L at 8/30/2021  5:49 AM  Total Bilirubin: 6.7 mg/dL at 8/30/2021  5:49 AM  INR(ratio): 1.61 at 8/30/2021  5:49 AM  Age: 56 years    Radiology  CT Chest 8/19/21:  IMPRESSION:   1. No evidence of pulmonary embolism.  Contrast mixing artifact in the  left lower lobe " superior segment pulmonary artery. No evidence of  right heart strain.  2. Endotracheal tube abutting the caryn.  3. Atelectasis/collapse of the posterior aspects of the bilateral  upper and lower lobes.   4. Tree in bud nodularity involving the left upper, left lower, and  the left middle ribs. Possibilities include infectious and  inflammatory etiologies. Recommend follow-up until resolution.  5. 5 mm pulmonary nodule of the right upper lobe, if the patient is  considered high risk for lung cancer consider follow-up low-dose chest  CT in 12 months.    US RUQ 8/23/21:  Impression:   1.  Cirrhotic liver with retrograde flow in the left portal vein  suggestive of portal hypertension. No evidence for portal venous  thrombosis.  2.  Mild ascites.      Assessment  55 year old with past medical history of hypertension, microscopic otitis, ulcerative colitis, pancreatic mucinous cystadenoma, PSC with cirrhosis who presented for liver transplantation but went into V. tach and surgery was aborted.  Patient was requiring pressor support and was transferred to ICU, now recovering on the floor    PSC cirrhosis meld 23  Patient was scheduled for ERCP the day of her aborted transplant surgery.  She has been requiring ERCP every 4 weeks with stent placement as bridge to liver transplantation.  Patient underwent ERCP today with removal of stone and sludge, right main hepatic duct was dilated and restented.  --Monitor LFTs and INR daily  -- Lactulose PO, or Miralax  -- Continue Ursodiol  -- Ensure sodium restriction to 2000 mg per day  -- Adequate protein diet (1.2 - 1.5g/kg/day)   - Recommend multiple meals during the day, Snacks and shakes during the day/night   - Can use Ensure/Boost drinks TID  --PT/OT  --Repeat ERCP in 4 weeks if not transplanted before    Intra-op VT   Patient has been extubated and not on pressors, her clinical course is more consistent with V. tach causing instability, less likely to be cholangitis given  remarkable improvement.  Pt had cardiology evaluation with recommendation of  cardiac cath which was negative for any obstructive coronary arterial disease. Transplant surgery has been involved and wants to rule out cardiac siderosis as possible cause of her V. Tach.  Her ferritin was not elevated, which points against siderosis.  -- Plan for cardiac MRI tomorrow and potential re-listing if MRI normal    Intra-abdominal ROMA drain  Patient has been requiring weekly paracentesis, now she has ROMA drain after her reported transplant surgery, which likely will continue to have increase output secondary to her history of cirrhosis and ascites. With the drain, she is at risk of developing SBP. She may require paracentesis every day to avoid fluid leakage if plans to remove ROMA drain by transplant surgery.  -- Hold diuretics  -- Monitor intake and output  -- ROMA drain with limit on maximal output 2L /day to avoid GONZALO  -- Recommend to given albumin today    Leukocytosis  Patient has slight increase in her leukocyte count, no fevers, with her ROMA drain she is at high risk for SBP, checking fluid analysis to rule out SBP.  Infectious work-up as per primary team.  She may require antibiotics for a long term.  --Recommend ROMA fluid analysis to rule out SBP  --Recommend blood culture and infectious work-up  --Low threshold for antibiotics      Thank you for involving us in this patient's care. Please do not hesitate to contact the GI service with any questions or concerns.      Pt care plan discussed with Dr. Mao, Hepatology staff physician.    This note was created with voice recognition software, and while reviewed for accuracy, typos may remain.    Audar Fuentes MD  Advance and Transplant Hepatology Fellow  Pager: [phone]

## 2021-08-30 NOTE — PROGRESS NOTES
[facility]    Progress Note - Cassie 4 Service        Date of Admission:  8/18/2021    Assessment & Plan        Bertaalfredo Nava is a 55 year old female with 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 She presented in stable condition from home for potential liver transplant. Patient was taken to the OR. Intraop, abdomen was opened, ascites was evacuated and falciform and left triangular ligament divided. At this time, patient developed vtach requiring 5-6 rounds of debrillation and the procedure was aborted. Consideration for starting ECMO, but patient converted out. Patient incisions closed. Taken to CT for concern for PE. CT showed no signs of PE. Initially admitted to SICU due to unstable hemodynamics on 3 pressors. Transferred to MICU 8/22 after coming of pressors. Transferred to floor 8/23.    Today:  - ERCP 8/30 with removal of sludge and stone as well as dilation and stenting. Will need to be preformed q4 weeks until transplanted  - Repeating ascitic fluid analysis 8/30 including cell counts, protein, albumin, Cx, and stain  - ROMA drain x1, will empty drain of 400 mL q4h, 25 g albumin qD ordered  - Cardiac MRI tomorrow, to be relisted if unremarkable     # Hepatic Cirrhosis Secondary to PSC/UC  Patient being reevaluated for liver transplantation due to arrhythmias. Currently inactive on transplant list.  - ROMA drain x1, will empty drain of 400 mL q4h, 25 g albumin qD ordered   -When ROMA removed, patient will need daily paracentesis to prevent leakage  - Hepatology consulted, appreciate recs  - Hold home diuretics given ROMA output and coming need for leann  - Continue PTA ursodiol   - Lactulose 20 BID ordered, PRN senna and miralax  - ERCP 8/30 with removal of sludge and stone as well as dilation and stenting. Will need to be preformed q4 weeks until transplanted    #.  Intraoperative V tach requiring 4 rounds of defibrillation   #. Distributive vs hypovolemic shock, resolved   Patient had wnl stress ECHO pre-op with EF 60-65%. Intraoperative v tach requiring defibrillation, came to SICU on 3 pressors. TTE post op 8/19 with global and regional left ventricular function is normal with an EF of 60-65%. Off pressors since 8/20.  - MAP goal > 55  - Amiodarone PO taper per EP  - Cards consulted, appreciate recs  - Cronary angiogram 8/25, without disease  - Plan for cardiac MRI Tuesday     #. SIRS  Unclear if any infectious source. Initially thought to possibly be cholangitis but less likely per discussion with transplant hepatology. Greatest concern for an intraabdominal source. OR, blood, and sputum cultures with NGTD. EBV IgG positive but IgM not reactive. At this point low suspicion for infection.   - ID consulted  - CTM Cx NGTD  - Monitoring off Abx  - Repeating ascitic fluid analysis 8/30 including cell counts, protein, albumin, Cx, and stain  - Previous ascitic fluid analysis: Cx NGTD, cell counts appear reactive, gram stain w/o organisms     #. Post Operative Abdominal Pain  - Oxycodone 5 mg q4 PRN    #. Acute blood loss anemia    S/p transfusion: 3 units intraoperative   - Goals: Hb > 8, Fibrinogen >200, INR < 2, plts > 50  - Continue to trend Hb     #. Post operative respiratory failure, resolved   - Extubated, on RA    #. GONZALO (BL Creat 0.7), resolved   - continue to monitor kidney function and urine output     #. Stress hyperglycemia, resolved  In setting of steroid administration, no longer needing insulin over past several days  - sliding scale insulin discontinued    #. Deconditioning  - PT and OT consulted    #. Severe Malnutrition in the Context of Acute Illness  -Calorie counts ordered  -PRN ensure ordered  -Will consider Marinol      Diet: Snacks/Supplements Adult: Other; whatever patient prefers; Between Meals  Combination Diet Regular Diet Adult; 2 gm NA Diet    DVT  Prophylaxis: Enoxaparin (Lovenox) SQ  Jurado Catheter: Not present  Fluids: PO  Central Lines: PRESENT  CVC TRIPLE LUMEN Right Internal jugular-Site Assessment: WDL  Code Status: Full Code      Disposition Plan   Expected discharge: >3 days recommended to TBD once transplant evaluation completed.     The patient's care was discussed with the Attending Physician, Dr. Hay.    Augustine Pena MD  [address]  Securely message with the Vocera Web Console (learn more here)  Text page via [facility] Paging/Directory  Please see sign in/sign out for up to date coverage information    Clinically Significant Risk Factors Present on Admission                ______________________________________________________________________    Interval History   NAEO. Patient with issue this AM with ROMA drain overflowing. Otherwise not reporting any acute symptoms this AM.    4 point ROS otherwise negative    Data reviewed today: I reviewed all medications, new labs and imaging results over the last 24 hours.    Physical Exam   Vital Signs: Temp: 97.1  F (36.2  C) Temp src: Temporal BP: 101/60 Pulse: 76   Resp: 18 SpO2: 98 % O2 Device: None (Room air) Oxygen Delivery: 2 LPM  Weight: 126 lbs 0 oz  General: awake, sitting up  HEENT:NC/AT, scleral icterus present    Pulm/Resp: no increased work of breathing, symmetric chest expansion   CV: RRR, S1. S2 present, no murmur appreciated   Abdomen: Soft, mildly distended, non-tender, no rebound tenderness or guarding, no masses ROMA drain w/ serosanguinous fluids.   Incisions/Skin: no jaundice   MSK/Extremities: trace peripheral edema, moving all extremities, calves soft, extremities well perfused    Data   Recent Labs   Lab 08/30/21  0549 08/29/21  0542 08/28/21  0708   WBC 21.1* 16.2* 13.1*   HGB 9.6* 8.8* 9.5*   MCV 89 91 90    148* 150   INR 1.61* 1.64* 1.74*   * 131* 134   POTASSIUM 3.5 3.9 3.8  3.8   CHLORIDE 103 103 106    CO2 16* 19* 19*   BUN 21 19 20   CR 0.54 0.53 0.45*   ANIONGAP 12 9 9   MARIELENA 9.2 8.4* 8.8   * 80 84   ALBUMIN 3.0* 2.8* 3.1*   PROTTOTAL 5.6* 5.3* 5.4*   BILITOTAL 6.7* 6.2* 6.2*   ALKPHOS 276* 244* 241*   ALT 36 37 42   AST 37 39 48*     Recent Results (from the past 24 hour(s))   XR Surgery CARRIE Fluoro G/T 5 Min w Stills    Narrative    This exam was marked as non-reportable because it will not be read by a   radiologist or a [facility] non-radiologist provider.           Medications       albumin human  25 g Intravenous Daily     amiodarone  400 mg Oral BID    Followed by     [START ON 8/31/2021] amiodarone  200 mg Oral Daily     enoxaparin ANTICOAGULANT  40 mg Subcutaneous Q24H     [Held by provider] furosemide  40 mg Oral Daily     heparin lock flush  5-20 mL Intracatheter Q24H     lactulose  20 g Oral BID     sodium chloride (PF)  10-40 mL Intracatheter Q8H     sodium chloride (PF)  10-40 mL Intracatheter Q8H     [Held by provider] spironolactone  100 mg Oral Daily     ursodiol  300 mg Oral BID

## 2021-08-30 NOTE — BRIEF OP NOTE
[facility] Brief Operative Note    Pre-operative diagnosis: Liver transplant candidate [Z76.82]  Cholangitis [K83.09]   Post-operative diagnosis As prior    Procedure: Procedure(s):  ENDOSCOPIC RETROGRADE CHOLANGIOPANCREATOGRAPHY with biliary dilation, stone removal, stent exchange   Surgeon(s): Surgeon(s) and Role:     * Gregory Gabriel MD - Primary     * Delmy Beasley MD - Fellow - Assisting   Estimated blood loss: * No values recorded between 8/30/2021  9:43 AM and 8/30/2021 10:36 AM *    Specimens: * No specimens in log *       ERCP findings:    - Three partially occluded stents from the biliary tree were seen in the major papilla. Extracted.   - Prior biliary sphincterotomy appeared open.   - Filling defects consistent with stones and sludge was seen on the cholangiogram.   - The right main hepatic duct was successfully dilated to 6 mm.   - Choledocholithiasis was found.  Complete removal was accomplished by balloon extraction.   - One 10 Fr x 17 cm transpapillary Johlin plastic stent was placed into the right hepatic duct.     Plan:  - Return patient to hospital strong for ongoing care.   - Observe patient's clinical course. Expect improvement in LFTs with removal of stones/sludge and stent exchange.   - Repeat ERCP with Dr. Gabriel in 4 weeks for stent exchange and further evaluation.   - The findings and recommendations were discussed with the patient and their family.

## 2021-08-30 NOTE — PROVIDER NOTIFICATION
"Cassie 4 Resident Augustine Pena paged regarding INR. Pager #[phone]. Awaiting response.     \"INR 1.61. Per note, says that pt needs plasma to get INR < 1.5. no orders for plasma. OR to come get pt at 0830. need orders if wanting to transfuse. Thanks!   Rebecca pineda [phone]\"    MD called back: 2 units of FFP order. Will transfuse.  "

## 2021-08-30 NOTE — PROGRESS NOTES
VSS. Afebrile. ERCP completed this am. Did not receive plasma prior to procedure as surgeon said they were not needed.  Tolerated well. Back on combination regular diet 2gm NA. Labs sent from ROMA drain. Drainage order changed to emptying 400ml q 4 hours per hepatology. Pt stated she voided last night but nothing saved. Pt educated to save urine so team can see. Hat placed in toilet. Changed dressing on R wrist incisions from angiogram. Began bleeding when dressing removed. New gauze and koban placed for pressure. Kept left intact. Continue tele; NSR. Continue with POC.

## 2021-08-30 NOTE — ANESTHESIA POSTPROCEDURE EVALUATION
Patient: Berta Nava    Procedure(s):  ENDOSCOPIC RETROGRADE CHOLANGIOPANCREATOGRAPHY with biliary dilation, stone removal, stent exchange    Diagnosis:Liver transplant candidate [Z76.82]  Cholangitis [K83.09]  Diagnosis Additional Information: No value filed.    Anesthesia Type:  General    Note:  Disposition: Inpatient   Postop Pain Control: Uneventful            Sign Out: Well controlled pain   PONV: No   Neuro/Psych: Uneventful            Sign Out: Acceptable/Baseline neuro status   Airway/Respiratory: Uneventful            Sign Out: Acceptable/Baseline resp. status   CV/Hemodynamics: Uneventful            Sign Out: Acceptable CV status; No obvious hypovolemia; No obvious fluid overload   Other NRE: NONE   DID A NON-ROUTINE EVENT OCCUR? No           Last vitals:  Vitals Value Taken Time   /71 08/30/21 1039   Temp 36.7    Pulse 84 08/30/21 1043   Resp 15    SpO2 100 % 08/30/21 1043   Vitals shown include unvalidated device data.    Electronically Signed By: Zafar Rosado MD  August 30, 2021  10:44 AM

## 2021-08-30 NOTE — PROGRESS NOTES
Calorie Count    Intake recorded for: 8/29/2021  Total Kcals: 0 Total Protein: 0g    Kcals from Hospital Food: 0   Protein: 0g    Kcals from Outside Food (average):0 Protein: 0g    # Meals Ordered from Kitchen: 2 meals ordered     # Meals Recorded: zero meals recorded on calorie counts sheet. Meal tray tickets not saved.    # Supplements Recorded: no intake recorded     Flowsheet shows 75% intake @ 1900, but no note stating what was eaten at this time; not enough information to accurately calculate nutritionals.

## 2021-08-30 NOTE — ANESTHESIA PROCEDURE NOTES
Airway       Patient location during procedure: OR       Procedure Start/Stop Times: 8/30/2021 9:23 AM  Staff -        CRNA: Kwabena Ace APRN CRNA       Performed By: CRNA  Consent for Airway        Urgency: elective  Indications and Patient Condition       Indications for airway management: gerardo-procedural       Induction type:intravenous       Mask difficulty assessment: 1 - vent by mask    Final Airway Details       Final airway type: endotracheal airway       Successful airway: ETT - single  Endotracheal Airway Details        ETT size (mm): 6.5       Cuffed: yes       Cuff volume (mL): 8       Successful intubation technique: direct laryngoscopy       DL Blade Type: Cheung 2       Grade View of Cords: 1       Adjucts: stylet       Position: Right       Measured from: gums/teeth       Secured at (cm): 20       Bite block used: None    Post intubation assessment        Placement verified by: capnometry, equal breath sounds and chest rise        Number of attempts at approach: 1       Number of other approaches attempted: 0       Secured with: pink tape       Ease of procedure: easy       Dentition: Intact

## 2021-08-30 NOTE — PLAN OF CARE
00:00-07:00 am  AF with stable VS  Continue on Telemetry with NSR  A&Ox4   Abdominal and lower back pain adequately manageable with prn Oxycodone 5 mg given x 1 this shift.  Denied nausea and SOB   NPO at midnight for ERCP today  Will need FFP if INR<1.5 prior to procedure. Abdominal  ROMA drain 500 ml at 05:00 am  Abdominal incision C/D/I with staples   Up with SBA  Voiding 100 ml and 2 loose BMs, on Lactulose and was not sure if urine mixed with stools.  Continue to monitor for urinary retention.  Pt is otherwise stable and no new acute events overnight  Slept well between cares  Continue w/POC

## 2021-08-30 NOTE — ANESTHESIA PREPROCEDURE EVALUATION
Anesthesia Pre-Procedure Evaluation    Patient: Berta Nava   MRN: [id number] : 1965        Preoperative Diagnosis: Cholangitis, sclerosing [K83.09]   Procedure : Procedure(s):  ERCP     Past Medical History:   Diagnosis Date     Ascites      Biliary cirrhosis (H)      Cholangitis, sclerosing      Cirrhosis of liver with ascites (H) 3/3/2021     Hearing loss of left ear     wears a hearing aide     History of low potassium      Hyperlipidemia      Hypertension      SBP (spontaneous bacterial peritonitis) (H) 2021     Sjogren's syndrome (H)      Ulcerative colitis (H)      Ulcerative pancolitis (H)       Past Surgical History:   Procedure Laterality Date     CV CORONARY ANGIOGRAM N/A 2021    Procedure: Coronary Angiogram with possible intervention;  Surgeon: David Wilhelm MD;  Location:  [facility]     ENDOSCOPIC RETROGRADE CHOLANGIOPANCREATOGRAM N/A 2021    Procedure: ENDOSCOPIC RETROGRADE CHOLANGIOPANCREATOGRAPHY with ballon sweep of bile ducts for stones, balloon dilation of bile ducts and bile duct stent placement;  Surgeon: Gregory Gabriel MD;  Location:  OR     ENDOSCOPIC RETROGRADE CHOLANGIOPANCREATOGRAM N/A 2021    Procedure: ENDOSCOPIC RETROGRADE CHOLANGIOPANCREATOGRAPHY STONE REMOVAL, DILATION AND STENT PLACEMENT;  Surgeon: Gregory Gabriel MD;  Location:  OR     ENDOSCOPIC RETROGRADE CHOLANGIOPANCREATOGRAPHY, EXCHANGE TUBE/STENT N/A 2021    Procedure: ENDOSCOPIC RETROGRADE CHOLANGIOPANCREATOGRAPHY WITH STENT EXCHANGE, STONE EXTRACTION, AND DILATION;  Surgeon: Gregory Gabriel MD;  Location:  OR     ENDOSCOPIC RETROGRADE CHOLANGIOPANCREATOGRAPHY, EXCHANGE TUBE/STENT N/A 5/10/2021    Procedure: ENDOSCOPIC RETROGRADE CHOLANGIOPANCREATOGRAPHY with biliary dilation, stone removal, stent exchange;  Surgeon: Gregory Gabriel MD;  Location:  OR     ENDOSCOPIC RETROGRADE CHOLANGIOPANCREATOGRAPHY, EXCHANGE TUBE/STENT N/A 6/10/2021    Procedure: ENDOSCOPIC  RETROGRADE CHOLANGIOPANCREATOGRAPHY, WITH biliary stent exchange, stone removal;  Surgeon: Woodrow Lott MD;  Location: [facility] OR     GYN SURGERY      bilat fallopian tubes and ovaries removed     PICC DOUBLE LUMEN PLACEMENT Right 2021    42cm (2cm external), Lateral brachial vein     TRANSPLANT LIVER RECIPIENT  DONOR N/A 2021    Procedure: Opening of abdomen, Abdominal Exploration and aborted liver transplant.;  Surgeon: Ernesto Schmitt MD;  Location: [facility] OR      Allergies   Allergen Reactions     Diagnostic X-Ray Materials Hives     PATIENT HAD HIVE REACTION AFTER ADMINISTERING CT CONTRAST DYE.       Contrast Dye       Social History     Tobacco Use     Smoking status: Never Smoker     Smokeless tobacco: Never Used   Substance Use Topics     Alcohol use: No     Comment: Last drink was 2017      Wt Readings from Last 1 Encounters:   21 59.5 kg (131 lb 1.6 oz)        Anesthesia Evaluation   Pt has had prior anesthetic. Type: General and MAC.        ROS/MED HX  ENT/Pulmonary:  - neg pulmonary ROS   (+) sleep apnea,     Neurologic:  - neg neurologic ROS     Cardiovascular:     (+) hypertension-----dysrhythmias, Previous cardiac testing   Echo: Date: 21 Results:  Global and regional left ventricular function is normal with an EF of 60-65%.  Global right ventricular function is normal. The right ventricle is normal  size.Global and regional left ventricular function is normal with an EF of 60-65%.  Global right ventricular function is normal. The right ventricle is normal  size.  No significant valvular abnormalities.  IVC diameter <2.1 cm collapsing >50% with sniff suggests a normal RA pressure  of 3 mmHg.  Ascites is noted.  This study was compared with the study from 2021 (resting tomograms from  stress study). No significant changes noted.  No significant valvular abnormalities.  IVC diameter <2.1 cm collapsing >50% with sniff suggests a normal RA pressure  of 3  mmHg.  Ascites is noted.  This study was compared with the study from 04/20/2021 (resting tomograms from  stress study). No significant changes noted.  Stress Test: Date: Results:    ECG Reviewed: Date: Results:    Cath: Date: 8/25 Results:  Left Main  The vessel was visualized by selective angiography. There was 0% vessel disease.  Left Anterior Descending  The vessel was visualized by selective angiography. There was 0% vessel disease.  Left Circumflex  The vessel was visualized by selective angiography. There was 0% vessel disease.  Right Coronary Artery  The vessel was visualized by selective angiography. There was 0% vessel disease.     Pulmonary hypertension: 5/4/21.   METS/Exercise Tolerance: 1 - Eating, dressing    Hematologic:  - neg hematologic  ROS     Musculoskeletal:  - neg musculoskeletal ROS     GI/Hepatic: Comment: Primary Sclerosing Cholangitis, Ulcerative pancolitis, ascites/ cirrhosis. Esophageal varices without bleeding.  Now s/p aborted liver transplant for unexplained CV collapse    (+) Inflammatory bowel disease, liver disease,     Renal/Genitourinary:       Endo:  - neg endo ROS     Psychiatric/Substance Use:  - neg psychiatric ROS     Infectious Disease: Comment: Admited 4/29/21 with sepsis, SBP, and cholangitis s/p stent placement.  - neg infectious disease ROS     Malignancy:  - neg malignancy ROS     Other:            Physical Exam    Airway        Mallampati: II   TM distance: > 3 FB   Neck ROM: full   Mouth opening: > 3 cm    Respiratory Devices and Support         Dental  no notable dental history         Cardiovascular   cardiovascular exam normal       Rhythm and rate: regular and normal     Pulmonary   pulmonary exam normal        breath sounds clear to auscultation           OUTSIDE LABS:  CBC:   Lab Results   Component Value Date    WBC 21.1 (H) 08/30/2021    WBC 16.2 (H) 08/29/2021    HGB 9.6 (L) 08/30/2021    HGB 8.8 (L) 08/29/2021    HCT 28.7 (L) 08/30/2021    HCT 26.8 (L)  08/29/2021     08/30/2021     (L) 08/29/2021     BMP:   Lab Results   Component Value Date     (L) 08/30/2021     (L) 08/29/2021    POTASSIUM 3.5 08/30/2021    POTASSIUM 3.9 08/29/2021    CHLORIDE 103 08/30/2021    CHLORIDE 103 08/29/2021    CO2 19 (L) 08/29/2021    CO2 19 (L) 08/28/2021    BUN 19 08/29/2021    BUN 20 08/28/2021    CR 0.53 08/29/2021    CR 0.45 (L) 08/28/2021    GLC 80 08/29/2021    GLC 84 08/28/2021     COAGS:   Lab Results   Component Value Date    PTT 30 08/18/2021    INR 1.61 (H) 08/30/2021    FIBR 588 (H) 08/18/2021     POC:   Lab Results   Component Value Date    BGM 95 06/25/2021     HEPATIC:   Lab Results   Component Value Date    ALBUMIN 2.8 (L) 08/29/2021    PROTTOTAL 5.3 (L) 08/29/2021    ALT 37 08/29/2021    AST 39 08/29/2021    ALKPHOS 244 (H) 08/29/2021    BILITOTAL 6.2 (H) 08/29/2021    MIHIR 48 06/09/2021     OTHER:   Lab Results   Component Value Date    PH 7.38 08/22/2021    LACT 0.9 08/22/2021    A1C 4.3 04/29/2021    MARIELENA 8.4 (L) 08/29/2021    PHOS 3.0 08/29/2021    MAG 2.0 08/29/2021    LIPASE 241 06/25/2021    AMYLASE 68 08/18/2021    CRP 40.0 (H) 08/19/2021       Anesthesia Plan    ASA Status:  4   NPO Status:  NPO Appropriate    Anesthesia Type: General.     - Airway: ETT   Induction: RSI.   Maintenance: Inhalation.   Techniques and Equipment:     Lines/Monitors: Zol pads in place prior to induction.     Consents            Postoperative Care    Pain management: IV analgesics.   PONV prophylaxis: Ondansetron (or other 5HT-3), Dexamethasone or Solumedrol, Droperidol or Haldol     Comments:                    Zafar Rosado MD

## 2021-08-30 NOTE — ANESTHESIA CARE TRANSFER NOTE
Patient: Berta Nava    Procedure(s):  ENDOSCOPIC RETROGRADE CHOLANGIOPANCREATOGRAPHY with biliary dilation, stone removal, stent exchange    Diagnosis: Liver transplant candidate [Z76.82]  Cholangitis [K83.09]  Diagnosis Additional Information: No value filed.    Anesthesia Type:   General     Note:    Oropharynx: spontaneously breathing and oropharynx clear of all foreign objects  Level of Consciousness: drowsy and awake  Oxygen Supplementation: nasal cannula  Level of Supplemental Oxygen (L/min / FiO2): 3  Independent Airway: airway patency satisfactory and stable  Dentition: dentition unchanged  Vital Signs Stable: post-procedure vital signs reviewed and stable  Report to RN Given: handoff report given  Patient transferred to: PACU  Comments: Pt extubated in the OR without incident or complications. Pt VSS upon arrival to the PACU. Pt has no c/o pain/N/V. Pt care report given to receiving RN.   Handoff Report: Identifed the Patient, Identified the Reponsible Provider, Reviewed the pertinent medical history, Discussed the surgical course, Reviewed Intra-OP anesthesia mangement and issues during anesthesia, Set expectations for post-procedure period and Allowed opportunity for questions and acknowledgement of understanding      Vitals:  Vitals Value Taken Time   /71 08/30/21 1039   Temp     Pulse 86 08/30/21 1041   Resp     SpO2 100 % 08/30/21 1041   Vitals shown include unvalidated device data.    Electronically Signed By: CHELSEY Burton CRNA  August 30, 2021  10:41 AM

## 2021-08-30 NOTE — PLAN OF CARE
[id number]     Pt VSS. C/o abdominal pain PRN oxycodone given with some relief. NPO at midnight for procedure in the morning. Pt ROMA dressing changed on evening. CVC dressing change on evening, clear/ light brown drainage. Caps changed on evening. ROMA is due to be drain next at 0415. Pt did not void on evening, bladder scanned amount was 281 cc.  Page the provider, but have not heard from him. Pt had couple of walks with her daughter on evening. No BM, pt is SBA. Continue with POC.

## 2021-08-31 ENCOUNTER — COMMITTEE REVIEW (OUTPATIENT)
Dept: TRANSPLANT | Facility: CLINIC | Age: 56
End: 2021-08-31

## 2021-08-31 ENCOUNTER — APPOINTMENT (OUTPATIENT)
Dept: MRI IMAGING | Facility: CLINIC | Age: 56
DRG: 420 | End: 2021-08-31
Attending: STUDENT IN AN ORGANIZED HEALTH CARE EDUCATION/TRAINING PROGRAM
Payer: COMMERCIAL

## 2021-08-31 ENCOUNTER — PREP FOR PROCEDURE (OUTPATIENT)
Dept: GASTROENTEROLOGY | Facility: CLINIC | Age: 56
End: 2021-08-31

## 2021-08-31 ENCOUNTER — ANESTHESIA (OUTPATIENT)
Dept: MEDSURG UNIT | Facility: CLINIC | Age: 56
End: 2021-08-31

## 2021-08-31 ENCOUNTER — ANESTHESIA EVENT (OUTPATIENT)
Dept: MEDSURG UNIT | Facility: CLINIC | Age: 56
End: 2021-08-31

## 2021-08-31 ENCOUNTER — APPOINTMENT (OUTPATIENT)
Dept: OCCUPATIONAL THERAPY | Facility: CLINIC | Age: 56
DRG: 420 | End: 2021-08-31
Attending: TRANSPLANT SURGERY
Payer: COMMERCIAL

## 2021-08-31 DIAGNOSIS — K83.01 PRIMARY SCLEROSING CHOLANGITIS (H): Primary | ICD-10-CM

## 2021-08-31 LAB
ALBUMIN SERPL-MCNC: 3.2 G/DL (ref 3.4–5)
ALP SERPL-CCNC: 268 U/L (ref 40–150)
ALT SERPL W P-5'-P-CCNC: 33 U/L (ref 0–50)
ANION GAP SERPL CALCULATED.3IONS-SCNC: 13 MMOL/L (ref 3–14)
AST SERPL W P-5'-P-CCNC: 34 U/L (ref 0–45)
BILIRUB DIRECT SERPL-MCNC: 4.2 MG/DL (ref 0–0.2)
BILIRUB SERPL-MCNC: 5.5 MG/DL (ref 0.2–1.3)
BUN SERPL-MCNC: 37 MG/DL (ref 7–30)
CALCIUM SERPL-MCNC: 9.1 MG/DL (ref 8.5–10.1)
CHLORIDE BLD-SCNC: 99 MMOL/L (ref 94–109)
CO2 SERPL-SCNC: 17 MMOL/L (ref 20–32)
CREAT SERPL-MCNC: 1.23 MG/DL (ref 0.52–1.04)
CREAT SERPL-MCNC: 1.46 MG/DL (ref 0.52–1.04)
ERCP: NORMAL
ERYTHROCYTE [DISTWIDTH] IN BLOOD BY AUTOMATED COUNT: 16.6 % (ref 10–15)
GFR SERPL CREATININE-BSD FRML MDRD: 39 ML/MIN/1.73M2
GFR SERPL CREATININE-BSD FRML MDRD: 48 ML/MIN/1.73M2
GLUCOSE BLD-MCNC: 137 MG/DL (ref 70–99)
HCT VFR BLD AUTO: 26.4 % (ref 35–47)
HGB BLD-MCNC: 9 G/DL (ref 11.7–15.7)
INR PPP: 1.39 (ref 0.85–1.15)
MAGNESIUM SERPL-MCNC: 2.4 MG/DL (ref 1.6–2.3)
MCH RBC QN AUTO: 30.1 PG (ref 26.5–33)
MCHC RBC AUTO-ENTMCNC: 34.1 G/DL (ref 31.5–36.5)
MCV RBC AUTO: 88 FL (ref 78–100)
PHOSPHATE SERPL-MCNC: 3.3 MG/DL (ref 2.5–4.5)
PLATELET # BLD AUTO: 174 10E3/UL (ref 150–450)
POTASSIUM BLD-SCNC: 3.9 MMOL/L (ref 3.4–5.3)
PROT SERPL-MCNC: 6 G/DL (ref 6.8–8.8)
RBC # BLD AUTO: 2.99 10E6/UL (ref 3.8–5.2)
SODIUM SERPL-SCNC: 129 MMOL/L (ref 133–144)
WBC # BLD AUTO: 17.1 10E3/UL (ref 4–11)

## 2021-08-31 PROCEDURE — 250N000011 HC RX IP 250 OP 636: Performed by: STUDENT IN AN ORGANIZED HEALTH CARE EDUCATION/TRAINING PROGRAM

## 2021-08-31 PROCEDURE — 97530 THERAPEUTIC ACTIVITIES: CPT | Mod: GO

## 2021-08-31 PROCEDURE — 80048 BASIC METABOLIC PNL TOTAL CA: CPT | Performed by: STUDENT IN AN ORGANIZED HEALTH CARE EDUCATION/TRAINING PROGRAM

## 2021-08-31 PROCEDURE — 250N000013 HC RX MED GY IP 250 OP 250 PS 637: Performed by: INTERNAL MEDICINE

## 2021-08-31 PROCEDURE — 85610 PROTHROMBIN TIME: CPT | Performed by: STUDENT IN AN ORGANIZED HEALTH CARE EDUCATION/TRAINING PROGRAM

## 2021-08-31 PROCEDURE — 83735 ASSAY OF MAGNESIUM: CPT | Performed by: STUDENT IN AN ORGANIZED HEALTH CARE EDUCATION/TRAINING PROGRAM

## 2021-08-31 PROCEDURE — 75561 CARDIAC MRI FOR MORPH W/DYE: CPT

## 2021-08-31 PROCEDURE — 84100 ASSAY OF PHOSPHORUS: CPT | Performed by: STUDENT IN AN ORGANIZED HEALTH CARE EDUCATION/TRAINING PROGRAM

## 2021-08-31 PROCEDURE — P9041 ALBUMIN (HUMAN),5%, 50ML: HCPCS | Performed by: STUDENT IN AN ORGANIZED HEALTH CARE EDUCATION/TRAINING PROGRAM

## 2021-08-31 PROCEDURE — 255N000002 HC RX 255 OP 636: Performed by: INTERNAL MEDICINE

## 2021-08-31 PROCEDURE — A9585 GADOBUTROL INJECTION: HCPCS | Performed by: INTERNAL MEDICINE

## 2021-08-31 PROCEDURE — 250N000013 HC RX MED GY IP 250 OP 250 PS 637: Performed by: NURSE PRACTITIONER

## 2021-08-31 PROCEDURE — 82565 ASSAY OF CREATININE: CPT | Performed by: STUDENT IN AN ORGANIZED HEALTH CARE EDUCATION/TRAINING PROGRAM

## 2021-08-31 PROCEDURE — 36592 COLLECT BLOOD FROM PICC: CPT | Performed by: STUDENT IN AN ORGANIZED HEALTH CARE EDUCATION/TRAINING PROGRAM

## 2021-08-31 PROCEDURE — 99233 SBSQ HOSP IP/OBS HIGH 50: CPT | Mod: GC | Performed by: INTERNAL MEDICINE

## 2021-08-31 PROCEDURE — 250N000013 HC RX MED GY IP 250 OP 250 PS 637: Performed by: STUDENT IN AN ORGANIZED HEALTH CARE EDUCATION/TRAINING PROGRAM

## 2021-08-31 PROCEDURE — 82248 BILIRUBIN DIRECT: CPT | Performed by: STUDENT IN AN ORGANIZED HEALTH CARE EDUCATION/TRAINING PROGRAM

## 2021-08-31 PROCEDURE — 250N000011 HC RX IP 250 OP 636: Performed by: NURSE PRACTITIONER

## 2021-08-31 PROCEDURE — 120N000002 HC R&B MED SURG/OB UMMC

## 2021-08-31 PROCEDURE — 85027 COMPLETE CBC AUTOMATED: CPT | Performed by: STUDENT IN AN ORGANIZED HEALTH CARE EDUCATION/TRAINING PROGRAM

## 2021-08-31 PROCEDURE — 75561 CARDIAC MRI FOR MORPH W/DYE: CPT | Mod: 26 | Performed by: STUDENT IN AN ORGANIZED HEALTH CARE EDUCATION/TRAINING PROGRAM

## 2021-08-31 RX ORDER — CEFTRIAXONE 1 G/1
1 INJECTION, POWDER, FOR SOLUTION INTRAMUSCULAR; INTRAVENOUS EVERY 24 HOURS
Status: DISCONTINUED | OUTPATIENT
Start: 2021-08-31 | End: 2021-09-07

## 2021-08-31 RX ORDER — ALBUMIN, HUMAN INJ 5% 5 %
25 SOLUTION INTRAVENOUS ONCE
Status: COMPLETED | OUTPATIENT
Start: 2021-08-31 | End: 2021-09-01

## 2021-08-31 RX ORDER — LIDOCAINE HYDROCHLORIDE 10 MG/ML
10 INJECTION, SOLUTION EPIDURAL; INFILTRATION; INTRACAUDAL; PERINEURAL ONCE
Status: DISCONTINUED | OUTPATIENT
Start: 2021-08-31 | End: 2021-09-02

## 2021-08-31 RX ORDER — GADOBUTROL 604.72 MG/ML
7.5 INJECTION INTRAVENOUS ONCE
Status: COMPLETED | OUTPATIENT
Start: 2021-08-31 | End: 2021-08-31

## 2021-08-31 RX ORDER — ALBUMIN, HUMAN INJ 5% 5 %
25 SOLUTION INTRAVENOUS ONCE
Status: COMPLETED | OUTPATIENT
Start: 2021-08-31 | End: 2021-08-31

## 2021-08-31 RX ADMIN — LACTULOSE 20 G: 10 POWDER, FOR SOLUTION ORAL at 20:12

## 2021-08-31 RX ADMIN — ALBUMIN HUMAN 25 G: 0.05 INJECTION, SOLUTION INTRAVENOUS at 10:34

## 2021-08-31 RX ADMIN — ALBUMIN HUMAN 25 G: 0.05 INJECTION, SOLUTION INTRAVENOUS at 08:24

## 2021-08-31 RX ADMIN — CEFTRIAXONE 1 G: 1 INJECTION, POWDER, FOR SOLUTION INTRAMUSCULAR; INTRAVENOUS at 17:03

## 2021-08-31 RX ADMIN — URSODIOL 300 MG: 300 CAPSULE ORAL at 08:13

## 2021-08-31 RX ADMIN — Medication 5 ML: at 07:36

## 2021-08-31 RX ADMIN — Medication 5 ML: at 15:56

## 2021-08-31 RX ADMIN — AMIODARONE HYDROCHLORIDE 200 MG: 200 TABLET ORAL at 08:13

## 2021-08-31 RX ADMIN — ENOXAPARIN SODIUM 40 MG: 100 INJECTION SUBCUTANEOUS at 08:13

## 2021-08-31 RX ADMIN — OXYCODONE HYDROCHLORIDE 5 MG: 5 TABLET ORAL at 03:22

## 2021-08-31 RX ADMIN — GADOBUTROL 7.5 ML: 604.72 INJECTION INTRAVENOUS at 12:10

## 2021-08-31 RX ADMIN — URSODIOL 300 MG: 300 CAPSULE ORAL at 20:12

## 2021-08-31 ASSESSMENT — MIFFLIN-ST. JEOR: SCORE: 1138.35

## 2021-08-31 ASSESSMENT — ACTIVITIES OF DAILY LIVING (ADL)
ADLS_ACUITY_SCORE: 14
ADLS_ACUITY_SCORE: 16
ADLS_ACUITY_SCORE: 14
ADLS_ACUITY_SCORE: 14

## 2021-08-31 NOTE — PLAN OF CARE
[id number]  VSS on RA. Gave Oxycodone 5 mg at 0322 for abdominal pain with relief. Denies nausea. ROMA drain emptied per order, emptied 350 mL. Continue to monitor.

## 2021-08-31 NOTE — PROGRESS NOTES
I was present with the medical student who participated in the service and in the documentation of the notes.  I have verified the history and personally performed the physical exam and medical decision making. I agree with the assessment and plan of care as documented in the note.     Patient with new GONZALO likely related to increased drain output when patient was draining to gravity yesterday. Will give additional 25 g 5% albumin on top of daily 25 g. Will monitor PM Cr for possible additional 25 g. Cardiac MRI completed today and normal. Anesthesia consulted per transplant surgery request. Per discussion, anesthesia does not have concerns with patient reacting to general anesthesia and would be willing to take the patient to the OR-- please see consult note for more information.     August 31, 2021   Augustine Pena MD  Internal Medicine, PGY-2     [facility]    Progress Note - Cassie 4 Service        Date of Admission:  8/18/2021    Assessment & Plan        Berta Nava is a 55 year old female with 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 She presented in stable condition from home for potential liver transplant. Patient was taken to the OR. Intraop, abdomen was opened, ascites was evacuated and falciform and left triangular ligament divided. At this time, patient developed vtach requiring 5-6 rounds of debrillation and the procedure was aborted. Consideration for starting ECMO, but patient converted out. Patient incisions closed. Taken to CT for concern for PE. CT showed no signs of PE. Initially admitted to SICU due to unstable hemodynamics on 3 pressors. Transferred to MICU 8/22 after coming of pressors. Transferred to floor 8/23.    Today:  - Cardiac MRI today at 12:30pm, to be relisted if unremarkable   - ROMA drain x1, will empty drain of 400 mL q4h  - GONZALO (Cr 1.23), albumin  increased to 50 for today (ordered), will continue 25 daily --> Recheck Cr at 7pm tonight and consider redosing 25 g if Cr > 1  - Transplant surgery will eval ROMA drain   - Anesthesia preop consult for possible liver transplant (pending cardiac MRI) - eval requested given hypotension and VTach w/ attempted transplant on 8/19    # Hepatic Cirrhosis Secondary to PSC/UC  Patient being reevaluated for liver transplantation due to arrhythmias. Currently inactive on transplant list. Significant serosanguinous output from ROMA (3.3L out in 24hrs).  - ROMA drain x1, intermittent draining 400 mL q4h, increased to 50 g 5% albumin qD (ordered), given GONZALO   -When ROMA removed, patient will need daily paracentesis to prevent leakage  - Transplant surgery to evaluate ROMA today  - Hepatology consulted, appreciate recs  - Hold home diuretics given ROMA output and coming need for leann  - Continue PTA ursodiol   - Lactulose 20 BID ordered, PRN senna and miralax  - ERCP 8/30 with removal of sludge and stone as well as dilation and stenting. Will need to be preformed q4 weeks until transplanted    #. GONZALO (BL Creat 0.5-0.7)  Significant fluid output from ROMA with net negative fluid status. Likely prerenal GONZALO secondary to hypovolemia.   - Cr 1.23 today (up from 0.5 on 8/30)  - albumin increased to 50 for today (ordered), will continue 25 daily --> Recheck Cr at 7pm tonight and consider redosing 25 g if Cr > 1    #. Intraoperative V tach requiring 4 rounds of defibrillation   #. Distributive vs hypovolemic shock, resolved   Patient had wnl stress ECHO pre-op with EF 60-65%. Intraoperative v tach requiring defibrillation, came to SICU on 3 pressors. TTE post op 8/19 with global and regional left ventricular function is normal with an EF of 60-65%. Off pressors since 8/20.  - MAP goal > 55  - Amiodarone PO taper per EP  - Cards consulted, appreciate recs  - Cronary angiogram 8/25, without disease  - Plan for cardiac MRI Tuesday     #. SIRS  Unclear  if any infectious source. Initially thought to possibly be cholangitis but less likely per discussion with transplant hepatology. Greatest concern for an intraabdominal source. OR, blood, and sputum cultures with NGTD. EBV IgG positive but IgM not reactive. At this point low suspicion for infection.   - ID consulted  - CTM Cx NGTD  - Monitoring off Abx  - Repeat ascitic fluid analysis 8/30 including cell counts, protein, albumin, Cx, and stain was negative for infection  - Previous ascitic fluid analysis: Cx NGTD, cell counts appear reactive, gram stain w/o organisms     #. Post Operative Abdominal Pain  - Oxycodone 5 mg q4 PRN    #. Acute blood loss anemia    S/p transfusion: 3 units intraoperative   - Goals: Hb > 8, Fibrinogen >200, INR < 2, plts > 50  - Continue to trend Hb     #. Post operative respiratory failure, resolved   - Extubated, on RA    #. Stress hyperglycemia, resolved  In setting of steroid administration, no longer needing insulin over past several days  - sliding scale insulin discontinued    #. Deconditioning  - PT and OT consulted    #. Severe Malnutrition in the Context of Acute Illness  -Calorie counts ordered  -PRN ensure ordered  -Will consider Marinol      Diet: Snacks/Supplements Adult: Other; whatever patient prefers; Between Meals  Combination Diet Regular Diet Adult; 2 gm NA Diet    DVT Prophylaxis: Enoxaparin (Lovenox) SQ  Jurado Catheter: Not present  Fluids: PO  Central Lines: PRESENT  CVC TRIPLE LUMEN Right Internal jugular-Site Assessment: WDL  Code Status: Full Code      Disposition Plan   Expected discharge: >3 days recommended to TBD once transplant evaluation completed.     The patient's care was discussed with the attending physician, Dr. Franks.    Sindhu Nair, MS4  [address]  Securely message with the Vocera Web Console (learn more here)  Text page via [employer] Paging/Directory  Please see sign in/sign out for  up to date coverage information    Clinically Significant Risk Factors Present on Admission                ______________________________________________________________________    Interval History   - Abdominal pain, thinks it is related to gas   - Has been up and walking in room  - Reports drainage from ROMA when sitting up or standing/walking   - Appropriately tearful when discussing cardiac MRI and hopes to be added back to the transplant list     Data reviewed today: I reviewed all medications, new labs and imaging results over the last 24 hours.    Physical Exam   Vital Signs: Temp: (!) 95.4  F (35.2  C) Temp src: Oral BP: 105/62 Pulse: 66   Resp: 16 SpO2: 100 % O2 Device: None (Room air)    Weight: 122 lbs 14.4 oz  General: awake, sitting up, appropriately tearful when discussing cardiac MRI and transplant  HEENT:NC/AT, scleral icterus present    Pulm/Resp: no increased work of breathing, symmetric chest expansion   CV: RRR, S1. S2 present, no murmur appreciated   Abdomen: Soft, mildly distended, non-tender, no rebound tenderness or guarding, no masses. ROMA drain w/ serosanguinous fluids.   Incisions/Skin: no jaundice, abdominal incision c/d/i  MSK/Extremities: no edema, moving all extremities, calves soft, extremities well perfused    Data   Recent Labs   Lab 08/31/21  0740 08/30/21  0549 08/29/21  0542   WBC 17.1* 21.1* 16.2*   HGB 9.0* 9.6* 8.8*   MCV 88 89 91    193 148*   INR 1.39* 1.61* 1.64*   * 131* 131*   POTASSIUM 3.9 3.5 3.9   CHLORIDE 99 103 103   CO2 17* 16* 19*   BUN 37* 21 19   CR 1.23* 0.54 0.53   ANIONGAP 13 12 9   MARIELENA 9.1 9.2 8.4*   * 100* 80   ALBUMIN 3.2* 3.0* 2.8*   PROTTOTAL 6.0* 5.6* 5.3*   BILITOTAL 5.5* 6.7* 6.2*   ALKPHOS 268* 276* 244*   ALT 33 36 37   AST 34 37 39     No results found for this or any previous visit (from the past 24 hour(s)).  Medications       albumin human  25 g Intravenous Daily     amiodarone  200 mg Oral Daily     enoxaparin ANTICOAGULANT  40  mg Subcutaneous Q24H     [Held by provider] furosemide  40 mg Oral Daily     heparin lock flush  5-20 mL Intracatheter Q24H     lactulose  20 g Oral BID     sodium chloride (PF)  10-40 mL Intracatheter Q8H     sodium chloride (PF)  10-40 mL Intracatheter Q8H     [Held by provider] spironolactone  100 mg Oral Daily     ursodiol  300 mg Oral BID

## 2021-08-31 NOTE — COMMITTEE REVIEW
Abdominal Committee Review Note     Evaluation Date: 3/23/2021  Committee Review Date: 8/31/2021    Organ being evaluated for: Liver    Transplant Phase: Waitlist  Transplant Status: Inactive    Transplant Coordinator: Wilmer Lazar Jr.  Transplant Surgeon:   Ernesto Schmitt    Referring Physician: Venus London    Primary Diagnosis: Primary Sclerosing Cholangitis: Ulcerative Colitis  Secondary Diagnosis:     Committee Review Members:  Nutrition Vania Cornejo, RD   Pharmacist Dana Trujillo, [facility]    - Clinical Helene Denney, JOSETTE, Katie Mejia, [facility]   Transplant Vanessa Durant, RN, Marjorie Reyes, RN, Lucy James, RN, Monalisa Bar, RN, Jr Wilmer Lazar RN, Andria Rowe, APRN CNP, Jin Akbar, RN, Sue Romero, RN   Transplant Hepatology  Scott Puri MD, Emma Mao MD, Fabio Gotti MD, Thomas M. Leventhal, MD   Transplant Surgery Ernesto Schmitt MD, Sri Wilks MD       Transplant Eligibility: Cirrhosis with MELD, PSC    Committee Review Decision: Make Active    Relative Contraindications: None    Absolute Contraindications: None    Committee Chair Emma Mao MD verbally attested to the committee's decision.    Committee Discussion Details:      Approved for activation    Continue to follow as IP and outpatient per protocol

## 2021-08-31 NOTE — PROGRESS NOTES
VSS. Afebrile. Tolerated Cardiac MRI. Based on MRI results, patient may get placed back on liver transplant list. Pt emotional and worried about results but is hopeful. Team came to restitch around ROMA site to help prevent leaking. Dressing saturated x1 and dressing changed. Abd staples c/d/i. Creatine increased from 0.5 to 1.23 this am. Total of 50gm albumin administered. Will recheck creat this jac at 1900.If not less than 1, may require more albumin. Page team this jac with updates. ROMA drainage all shift was straw yellow until about 1430. Writer noted serosangenous drainage along with several clots. MD aware. Continue to follow pt care order of emptying  400ml q 4 hours from ROMA. Max of 2.4-2.5L/day to be emptied. 1400ml emptied since 0000 plus drainage around site. Continue to monitor and update team.  No voiding noted during shift; pt was NPO for Cardiac MRI for several hours. Pt educated to save urine and encouraging PO intake. Tele NS. Continue with POC.

## 2021-08-31 NOTE — PROGRESS NOTES
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                  Vtach                  Leukocytosis    24 hour events:   Underwent ERCP yesterday, Cardiac MRI today  Subjective:  C/o leakage around the ROMA insertion site  No abd pain, no fever    Medications  Current Facility-Administered Medications   Medication Dose Route Frequency     albumin human  25 g Intravenous Daily     amiodarone  200 mg Oral Daily     enoxaparin ANTICOAGULANT  40 mg Subcutaneous Q24H     [Held by provider] furosemide  40 mg Oral Daily     heparin lock flush  5-20 mL Intracatheter Q24H     lactulose  20 g Oral BID     sodium chloride (PF)  10-40 mL Intracatheter Q8H     sodium chloride (PF)  10-40 mL Intracatheter Q8H     [Held by provider] spironolactone  100 mg Oral Daily     ursodiol  300 mg Oral BID       Review of systems  A 10-point review of systems was negative.    Examination  /62   Pulse 66   Temp (!) 95.4  F (35.2  C) (Oral)   Resp 16   Ht 1.651 m (5' 5\")   Wt 55.7 kg (122 lb 14.4 oz)   SpO2 100%   BMI 20.45 kg/m      Constitutional: cooperative, pleasant  Eyes: Sclera icteric  Ears/nose/mouth/throat: mucus membranes moist  Neck: supple  CV: No edema  Respiratory: Unlabored breathing   Abd: Nondistended, +bs, mild b/l LQ tenderness, surgical incisions in place, ROMA in place  Skin: warm, perfused, jaundiced  Neuro: AAO x 3, no asterixis      MELD-Na score: 24 at 8/31/2021  7:40 AM  MELD score: 18 at 8/31/2021  7:40 AM  Calculated from:  Serum Creatinine: 1.23 mg/dL at 8/31/2021  7:40 AM  Serum Sodium: 129 mmol/L at 8/31/2021  7:40 AM  Total Bilirubin: 5.5 mg/dL at 8/31/2021  7:40 AM  INR(ratio): 1.39 at 8/31/2021  7:40 AM  Age: 56 years    Radiology  CT Chest 8/19/21:  IMPRESSION:   1. No evidence of pulmonary embolism.  Contrast mixing artifact in the  left lower lobe superior segment pulmonary artery. No evidence of  right heart strain.  2. Endotracheal " tube abutting the caryn.  3. Atelectasis/collapse of the posterior aspects of the bilateral  upper and lower lobes.   4. Tree in bud nodularity involving the left upper, left lower, and  the left middle ribs. Possibilities include infectious and  inflammatory etiologies. Recommend follow-up until resolution.  5. 5 mm pulmonary nodule of the right upper lobe, if the patient is  considered high risk for lung cancer consider follow-up low-dose chest  CT in 12 months.    US RUQ 8/23/21:  Impression:   1.  Cirrhotic liver with retrograde flow in the left portal vein  suggestive of portal hypertension. No evidence for portal venous  thrombosis.  2.  Mild ascites.      ERCP 8/30/21:  Impression:          - Three partially occluded stents from the biliary tree                        were seen in the major papilla. Extracted.                        - Prior biliary sphincterotomy appeared open.                        - Filling defects consistent with stones and sludge was                        seen on the cholangiogram mainly in the right lobe and                        common duct. The left intrahepatics looked much improved                        compared to prior.                        - The right main/anterior hepatic duct stricture which                        had upstream filling defects was successfully dilated to                        6 mm.                        - Biliary tree was swept several times clearing multiple                        small stones and sludge. Saccular dilation off of right                        intrahepatic branch with stones only partially cleared                        therefore a stent was replaced here.                        - One 10 Fr x 17 cm transpapillary Johlin plastic stent                        was placed into the right main hepatic duct.   Recommendation:      Overall impression is 56 year-old female with PSC                        cirrhosis who needed repeat ERCPs for  further stone                        clearance and stents exchange as a bridge to liver                        transplant. ERCP today showed clearance of L                        intrahepatic ducts and a mildly stenosed right anterior                        duct with upstream saccular dilation and stones.                        Clearance of intra and extrahepatic stones and sludges                        but some remain in the right intrahepatics. A 10 Fr                        stent was placed into the right anterior duct to ensure                        drainage as a bridge to transplant.                        - Return patient to hospital strong for ongoing care.                        - Repeat ERCP with Dr. Gabriel in 4 weeks assuming patient                        has not been transplanted by then. Earlier if signs of                        recurrent cholangitis or rising LFTs.                        - The findings and recommendations were discussed with                        the patient and their family.      Assessment  55 year old with past medical history of hypertension, microscopic otitis, ulcerative colitis, pancreatic mucinous cystadenoma, PSC with cirrhosis who presented for liver transplantation but went into V. tach and surgery was aborted.  Patient was requiring pressor support and was transferred to ICU, now recovering on the floor.    PSC cirrhosis meld 24  Patient was scheduled for ERCP the day of her aborted transplant surgery.  She has been requiring ERCP every 4 weeks with stent placement as bridge to liver transplantation.  Patient underwent ERCP with removal of stone and sludge, right main hepatic duct was dilated and restented.  -- Monitor LFTs and INR daily  -- Lactulose PO, or Miralax  -- Continue Ursodiol  -- Ensure sodium restriction to 2000 mg per day  -- Adequate protein diet (1.2 - 1.5g/kg/day)   - Recommend multiple meals during the day, Snacks and shakes during the day/night   - Can  use Ensure/Boost drinks TID  --PT/OT  --Repeat ERCP in 4 weeks if not transplanted before    Intra-op VT   Pt had cardiology evaluation with recommendation of  cardiac cath which was negative for any obstructive coronary arterial disease. Transplant surgery has been involved and wants to rule out cardiac siderosis as possible cause of her V. Tach.  Her ferritin was not elevated, which points against siderosis.  -- Plan for cardiac MRI today and potential re-listing if MRI normal    Intra-abdominal ROMA drain  Patient has been requiring weekly paracentesis, now she has ROMA drain after her reported transplant surgery, which likely will continue to have increase output secondary to her history of cirrhosis and ascites. With the drain, she is at risk of developing SBP. She may require paracentesis every day to avoid fluid leakage if plans to remove ROMA drain by transplant surgery.  -- Continue to hold diuretics, but will reevaluate daily  -- Monitor intake and output  -- ROMA drain with limit on maximal output 2L /day to avoid GONZALO  -- Recommend to given albumin for GONZALO    Leukocytosis  Patient has slight increase in her leukocyte count, no fevers, with her ROMA drain she is at high risk for SBP, fluid analysis yesterday was -ve.  Leukocytosis can be due to PSC and retained stone/ sludge.   --Low threshold for antibiotics    Thank you for involving us in this patient's care. Please do not hesitate to contact the GI service with any questions or concerns.      Pt care plan discussed with Dr. Mao, Hepatology staff physician.    This note was created with voice recognition software, and while reviewed for accuracy, typos may remain.    Audra Fuentes MD  Advance and Transplant Hepatology Fellow  Pager: [phone]

## 2021-08-31 NOTE — PLAN OF CARE
Occupational Therapy Discharge Summary    Reason for therapy discharge:    All goals and outcomes met, no further needs identified.    Progress towards therapy goal(s). See goals on Care Plan in [facility] electronic health record for goal details.  Goals met    Therapy recommendation(s):    Continue home exercise program.

## 2021-08-31 NOTE — CONSULTS
Brief Anesthesiology Consult note:     Patient is a 57 yo F who presented on 8/19/21 for liver transplant complicated intraoperative unstable ventricular tachycardia requiring multiple defibrillations. Given her instability, the procedure was aborted and her abdomen was closed. She was transported to the ICU on multiple pressors. The medicine team consulted anesthesiology for preoperative clearance in preparation for a liver transplant if one becomes available.     After the aborted procedure, the patient was worked up for cardiac causes of her arrhythmia. Heart cath and cardiac MRI all unremarkable.   Since the arrhythmias, she has undergone general anesthesia for ERCP without issue.     ECHO 8/19/21 --  Interpretation Summary  Global and regional left ventricular function is normal with an EF of 60-65%.  Global right ventricular function is normal. No pericardial effusion is present. There has been no change.    CV cardiac Cath 8/25/21 --   Normal coronaries     Cardiac MRI 8/31/21 --   1. The LV is normal in cavity size and wall thickness. The global systolic function is hyperdynamic. The  LVEF is >70%. There are no regional wall motion abnormalities.  2. The RV is normal in cavity size. The global systolic function is hyperdynamic. The RVEF is >70%.   3. Both atria are normal in size.  4. There is no significant valvular disease.   5. Late gadolinium enhancement imaging shows no MI, fibrosis or infiltrative disease.   6. There is no pericardial effusion or thickening.  7. There is no intracardiac thrombus.  8. A central venous catheter is noted in the superior vena cava. It extends into the right atrium.  CONCLUSIONS: Normal cardiac function without evidence of fibrosis, aneurysm, or morphological features to suggest the cause of ventricular tachycardia      Unclear why patient had unstable arrhythmia during previous aborted liver transplant, but given the negative cardiac work-up and further tolerance of  general anesthesia, no further investigations from an anesthesia perspective prior to undergoing next operation. Would recommend having defib pads on patient prior to the induction of general anesthesia.     Discussed with MD Steven Gentile,   Anesthesiology PGY-4

## 2021-08-31 NOTE — PROGRESS NOTES
Care Management Follow Up    Length of Stay (days): 12    Expected Discharge Date: 09/02/2021     Concerns to be Addressed: Discharge Planning     Patient plan of care discussed at interdisciplinary rounds: Yes     Anticipated Discharge Disposition: Home     Anticipated Discharge Services: None  Anticipated Discharge DME: None     Patient/family educated on Medicare website which has current facility and service quality ratings: Yes  Education Provided on the Discharge Plan: Yes   Patient/Family in Agreement with the Plan: Yes     Referrals Placed by CM/SW:  N/A  Private pay costs discussed: Not applicable    Additional Information:    Per chart review, PT/OT is now recommending home with assist upon discharge.     RNCC had been attempting to find a home care agency for PT/OT per previous therapy recs. [facility] had made referrals to 15 agencies, however patient was declined for various reasons (see note on 8/27).  PT/OT no longer warranted at this time.     Per team, patient had an ERCP yesterday, with plans for a Cardiac MRI today to determine the potential to relist for liver transplant.    Care Management will continue to follow and assist with discharge planning as needed.     Malia rGaves RN, BSN, PHN  Care Coordinator   P: [phone], [facility]

## 2021-08-31 NOTE — LETTER
Berta Nava  [address] [facility] [id number]    August 31, 2021    Dear Berta    This letter is being sent to notify you that your status was changed to active on the liver transplant waiting list at the [facility], on 8/31/21.  Your status was changed because your cardiac work-up has been completed and approved for reactivation.    We would like the opportunity to discuss this decision with you.  If you are not scheduled for a return visit with your provider here, please call [phone] or [phone] to make an appointment.  We recommend that you continue to follow with your care providers for continued management of your liver disease.    Our program has physician and surgeon coverage 24 hours a day, 365 days a year.  If this coverage changes or there are substantial program changes, you will be notified in writing by letter.    You have been sent this letter to comply with the [employer] ([employer]) guidelines.    Finally, attached is a letter from the [employer] ([employer]).  It describes the services and information offered to patients by [employer] and the [employer] Network.    We appreciate having had the opportunity to participate in your care.  If you have questions, please feel free to call the Transplant Office at [phone] or call [phone] to schedule an appointment.    Sincerely,    Wilmer Lazar Jr., BSN, RN  Liver Transplant Coordinator  [phone]    Encl: CC: care team

## 2021-08-31 NOTE — PLAN OF CARE
"BP 90/53 (BP Location: Right arm)   Pulse 67   Temp (!) 96  F (35.6  C) (Oral)   Resp 18   Ht 1.651 m (5' 5\")   Wt 57.2 kg (126 lb)   SpO2 98%   BMI 20.97 kg/m     VSS.Pt oriented x4 and denies pain.Calm and cooperative. Has IJ triple lumen,HLx3. ml.w/o BM this shift. ROMA drain totals 625 ml. Abd incision open to air with staples,w/out drainage.  Ate 50% of dinner.Edema  on BLE +1. Albumin 500 ml given tonight. Continue with POC.  "

## 2021-09-01 ENCOUNTER — APPOINTMENT (OUTPATIENT)
Dept: GENERAL RADIOLOGY | Facility: CLINIC | Age: 56
DRG: 420 | End: 2021-09-01
Attending: STUDENT IN AN ORGANIZED HEALTH CARE EDUCATION/TRAINING PROGRAM
Payer: COMMERCIAL

## 2021-09-01 ENCOUNTER — APPOINTMENT (OUTPATIENT)
Dept: ULTRASOUND IMAGING | Facility: CLINIC | Age: 56
DRG: 420 | End: 2021-09-01
Attending: STUDENT IN AN ORGANIZED HEALTH CARE EDUCATION/TRAINING PROGRAM
Payer: COMMERCIAL

## 2021-09-01 LAB
ALBUMIN SERPL-MCNC: 3.4 G/DL (ref 3.4–5)
ALBUMIN UR-MCNC: 20 MG/DL
ALP SERPL-CCNC: 204 U/L (ref 40–150)
ALT SERPL W P-5'-P-CCNC: 29 U/L (ref 0–50)
ANION GAP SERPL CALCULATED.3IONS-SCNC: 10 MMOL/L (ref 3–14)
APPEARANCE UR: CLEAR
AST SERPL W P-5'-P-CCNC: 30 U/L (ref 0–45)
BACTERIA BLD CULT: NO GROWTH
BACTERIA BLD CULT: NO GROWTH
BACTERIA FLD CULT: NO GROWTH
BILIRUB DIRECT SERPL-MCNC: 2.3 MG/DL (ref 0–0.2)
BILIRUB SERPL-MCNC: 3.1 MG/DL (ref 0.2–1.3)
BILIRUB UR QL STRIP: NEGATIVE
BUN SERPL-MCNC: 50 MG/DL (ref 7–30)
CALCIUM SERPL-MCNC: 9.4 MG/DL (ref 8.5–10.1)
CHLORIDE BLD-SCNC: 102 MMOL/L (ref 94–109)
CO2 SERPL-SCNC: 19 MMOL/L (ref 20–32)
COLOR UR AUTO: YELLOW
CREAT SERPL-MCNC: 1.44 MG/DL (ref 0.52–1.04)
CREAT SERPL-MCNC: 1.45 MG/DL (ref 0.52–1.04)
ERYTHROCYTE [DISTWIDTH] IN BLOOD BY AUTOMATED COUNT: 17.4 % (ref 10–15)
GFR SERPL CREATININE-BSD FRML MDRD: 40 ML/MIN/1.73M2
GFR SERPL CREATININE-BSD FRML MDRD: 40 ML/MIN/1.73M2
GLUCOSE BLD-MCNC: 104 MG/DL (ref 70–99)
GLUCOSE UR STRIP-MCNC: NEGATIVE MG/DL
HCT VFR BLD AUTO: 24 % (ref 35–47)
HGB BLD-MCNC: 7.9 G/DL (ref 11.7–15.7)
HGB UR QL STRIP: NEGATIVE
HYALINE CASTS: 8 /LPF
INR PPP: 1.48 (ref 0.85–1.15)
KETONES UR STRIP-MCNC: NEGATIVE MG/DL
LEUKOCYTE ESTERASE UR QL STRIP: NEGATIVE
MAGNESIUM SERPL-MCNC: 2.4 MG/DL (ref 1.6–2.3)
MCH RBC QN AUTO: 29.4 PG (ref 26.5–33)
MCHC RBC AUTO-ENTMCNC: 32.9 G/DL (ref 31.5–36.5)
MCV RBC AUTO: 89 FL (ref 78–100)
NITRATE UR QL: NEGATIVE
PH UR STRIP: 5.5 [PH] (ref 5–7)
PHOSPHATE SERPL-MCNC: 3.5 MG/DL (ref 2.5–4.5)
PLATELET # BLD AUTO: 178 10E3/UL (ref 150–450)
POTASSIUM BLD-SCNC: 4.2 MMOL/L (ref 3.4–5.3)
PROT SERPL-MCNC: 5.7 G/DL (ref 6.8–8.8)
RBC # BLD AUTO: 2.69 10E6/UL (ref 3.8–5.2)
RBC URINE: <1 /HPF
SODIUM SERPL-SCNC: 131 MMOL/L (ref 133–144)
SODIUM UR-SCNC: <5 MMOL/L
SP GR UR STRIP: 1.02 (ref 1–1.03)
SQUAMOUS EPITHELIAL: <1 /HPF
TRANSITIONAL EPI: <1 /HPF
UROBILINOGEN UR STRIP-MCNC: NORMAL MG/DL
WBC # BLD AUTO: 16 10E3/UL (ref 4–11)
WBC URINE: 2 /HPF

## 2021-09-01 PROCEDURE — 85610 PROTHROMBIN TIME: CPT | Performed by: STUDENT IN AN ORGANIZED HEALTH CARE EDUCATION/TRAINING PROGRAM

## 2021-09-01 PROCEDURE — 250N000011 HC RX IP 250 OP 636: Performed by: STUDENT IN AN ORGANIZED HEALTH CARE EDUCATION/TRAINING PROGRAM

## 2021-09-01 PROCEDURE — 84300 ASSAY OF URINE SODIUM: CPT | Performed by: STUDENT IN AN ORGANIZED HEALTH CARE EDUCATION/TRAINING PROGRAM

## 2021-09-01 PROCEDURE — P9041 ALBUMIN (HUMAN),5%, 50ML: HCPCS

## 2021-09-01 PROCEDURE — 82248 BILIRUBIN DIRECT: CPT | Performed by: STUDENT IN AN ORGANIZED HEALTH CARE EDUCATION/TRAINING PROGRAM

## 2021-09-01 PROCEDURE — 120N000002 HC R&B MED SURG/OB UMMC

## 2021-09-01 PROCEDURE — 250N000013 HC RX MED GY IP 250 OP 250 PS 637: Performed by: NURSE PRACTITIONER

## 2021-09-01 PROCEDURE — P9041 ALBUMIN (HUMAN),5%, 50ML: HCPCS | Performed by: STUDENT IN AN ORGANIZED HEALTH CARE EDUCATION/TRAINING PROGRAM

## 2021-09-01 PROCEDURE — 99207 PR NON-BILLABLE SERV PER CHARTING: CPT | Performed by: INTERNAL MEDICINE

## 2021-09-01 PROCEDURE — 44500 INTRO GASTROINTESTINAL TUBE: CPT | Mod: GC | Performed by: RADIOLOGY

## 2021-09-01 PROCEDURE — 81001 URINALYSIS AUTO W/SCOPE: CPT | Performed by: STUDENT IN AN ORGANIZED HEALTH CARE EDUCATION/TRAINING PROGRAM

## 2021-09-01 PROCEDURE — 84100 ASSAY OF PHOSPHORUS: CPT | Performed by: STUDENT IN AN ORGANIZED HEALTH CARE EDUCATION/TRAINING PROGRAM

## 2021-09-01 PROCEDURE — 76770 US EXAM ABDO BACK WALL COMP: CPT | Mod: 26 | Performed by: RADIOLOGY

## 2021-09-01 PROCEDURE — 36592 COLLECT BLOOD FROM PICC: CPT | Performed by: STUDENT IN AN ORGANIZED HEALTH CARE EDUCATION/TRAINING PROGRAM

## 2021-09-01 PROCEDURE — 44500 INTRO GASTROINTESTINAL TUBE: CPT

## 2021-09-01 PROCEDURE — 250N000011 HC RX IP 250 OP 636

## 2021-09-01 PROCEDURE — 272N000470 XR FEEDING TUBE PLACEMENT

## 2021-09-01 PROCEDURE — 80048 BASIC METABOLIC PNL TOTAL CA: CPT | Performed by: STUDENT IN AN ORGANIZED HEALTH CARE EDUCATION/TRAINING PROGRAM

## 2021-09-01 PROCEDURE — 250N000009 HC RX 250: Performed by: RADIOLOGY

## 2021-09-01 PROCEDURE — 99233 SBSQ HOSP IP/OBS HIGH 50: CPT | Mod: GC | Performed by: INTERNAL MEDICINE

## 2021-09-01 PROCEDURE — 250N000011 HC RX IP 250 OP 636: Performed by: NURSE PRACTITIONER

## 2021-09-01 PROCEDURE — 0DHA7UZ INSERTION OF FEEDING DEVICE INTO JEJUNUM, VIA NATURAL OR ARTIFICIAL OPENING: ICD-10-PCS | Performed by: RADIOLOGY

## 2021-09-01 PROCEDURE — 74340 X-RAY GUIDE FOR GI TUBE: CPT | Mod: 26 | Performed by: RADIOLOGY

## 2021-09-01 PROCEDURE — 83735 ASSAY OF MAGNESIUM: CPT | Performed by: STUDENT IN AN ORGANIZED HEALTH CARE EDUCATION/TRAINING PROGRAM

## 2021-09-01 PROCEDURE — 99232 SBSQ HOSP IP/OBS MODERATE 35: CPT | Mod: GC | Performed by: STUDENT IN AN ORGANIZED HEALTH CARE EDUCATION/TRAINING PROGRAM

## 2021-09-01 PROCEDURE — 250N000013 HC RX MED GY IP 250 OP 250 PS 637: Performed by: STUDENT IN AN ORGANIZED HEALTH CARE EDUCATION/TRAINING PROGRAM

## 2021-09-01 PROCEDURE — 85027 COMPLETE CBC AUTOMATED: CPT | Performed by: STUDENT IN AN ORGANIZED HEALTH CARE EDUCATION/TRAINING PROGRAM

## 2021-09-01 PROCEDURE — 82565 ASSAY OF CREATININE: CPT | Performed by: STUDENT IN AN ORGANIZED HEALTH CARE EDUCATION/TRAINING PROGRAM

## 2021-09-01 PROCEDURE — 76770 US EXAM ABDO BACK WALL COMP: CPT

## 2021-09-01 RX ORDER — DEXTROSE MONOHYDRATE 100 MG/ML
INJECTION, SOLUTION INTRAVENOUS CONTINUOUS PRN
Status: DISCONTINUED | OUTPATIENT
Start: 2021-09-01 | End: 2021-09-09 | Stop reason: HOSPADM

## 2021-09-01 RX ORDER — ALBUMIN, HUMAN INJ 5% 5 %
50 SOLUTION INTRAVENOUS DAILY
Status: DISCONTINUED | OUTPATIENT
Start: 2021-09-01 | End: 2021-09-04

## 2021-09-01 RX ORDER — ALBUMIN, HUMAN INJ 5% 5 %
25 SOLUTION INTRAVENOUS ONCE
Status: COMPLETED | OUTPATIENT
Start: 2021-09-01 | End: 2021-09-02

## 2021-09-01 RX ORDER — LIDOCAINE HYDROCHLORIDE 20 MG/ML
5 SOLUTION OROPHARYNGEAL ONCE
Status: COMPLETED | OUTPATIENT
Start: 2021-09-01 | End: 2021-09-01

## 2021-09-01 RX ORDER — GUAIFENESIN 600 MG/1
15 TABLET, EXTENDED RELEASE ORAL DAILY
Status: DISCONTINUED | OUTPATIENT
Start: 2021-09-01 | End: 2021-09-09 | Stop reason: HOSPADM

## 2021-09-01 RX ORDER — AMINO AC/PROTEIN HYDR/WHEY PRO 10G-100/30
1 LIQUID (ML) ORAL 2 TIMES DAILY
Status: DISCONTINUED | OUTPATIENT
Start: 2021-09-01 | End: 2021-09-09 | Stop reason: HOSPADM

## 2021-09-01 RX ADMIN — CEFTRIAXONE 1 G: 1 INJECTION, POWDER, FOR SOLUTION INTRAMUSCULAR; INTRAVENOUS at 17:38

## 2021-09-01 RX ADMIN — Medication 5 ML: at 04:28

## 2021-09-01 RX ADMIN — ALBUMIN HUMAN 25 G: 0.05 INJECTION, SOLUTION INTRAVENOUS at 00:13

## 2021-09-01 RX ADMIN — Medication 1 PACKET: at 20:40

## 2021-09-01 RX ADMIN — LACTULOSE 20 G: 10 POWDER, FOR SOLUTION ORAL at 12:09

## 2021-09-01 RX ADMIN — LIDOCAINE HYDROCHLORIDE 10 ML: 20 SOLUTION ORAL; TOPICAL at 11:00

## 2021-09-01 RX ADMIN — ALBUMIN HUMAN 12.5 G: 0.05 INJECTION, SOLUTION INTRAVENOUS at 12:24

## 2021-09-01 RX ADMIN — ALBUMIN HUMAN 12.5 G: 0.05 INJECTION, SOLUTION INTRAVENOUS at 15:04

## 2021-09-01 RX ADMIN — AMIODARONE HYDROCHLORIDE 200 MG: 200 TABLET ORAL at 12:08

## 2021-09-01 RX ADMIN — URSODIOL 300 MG: 300 CAPSULE ORAL at 12:08

## 2021-09-01 RX ADMIN — URSODIOL 300 MG: 300 CAPSULE ORAL at 20:41

## 2021-09-01 RX ADMIN — LACTULOSE 20 G: 10 POWDER, FOR SOLUTION ORAL at 20:54

## 2021-09-01 RX ADMIN — Medication 5 ML: at 20:54

## 2021-09-01 RX ADMIN — Medication 15 ML: at 20:54

## 2021-09-01 RX ADMIN — ALBUMIN HUMAN 12.5 G: 0.05 INJECTION, SOLUTION INTRAVENOUS at 10:34

## 2021-09-01 RX ADMIN — ALBUMIN HUMAN 12.5 G: 0.05 INJECTION, SOLUTION INTRAVENOUS at 13:27

## 2021-09-01 RX ADMIN — ENOXAPARIN SODIUM 40 MG: 100 INJECTION SUBCUTANEOUS at 12:08

## 2021-09-01 ASSESSMENT — MIFFLIN-ST. JEOR: SCORE: 1146.97

## 2021-09-01 ASSESSMENT — ACTIVITIES OF DAILY LIVING (ADL)
ADLS_ACUITY_SCORE: 16

## 2021-09-01 NOTE — PROGRESS NOTES
[facility]    Progress Note - Cassie 4 Service        Date of Admission:  8/18/2021    Assessment & Plan        Bertaalfredo Nava is a 55 year old female with 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 She presented in stable condition from home for potential liver transplant. Patient was taken to the OR. Intraop, abdomen was opened, ascites was evacuated and falciform and left triangular ligament divided. At this time, patient developed vtach requiring 5-6 rounds of debrillation and the procedure was aborted. Consideration for starting ECMO, but patient converted out. Patient incisions closed. Taken to CT for concern for PE. CT showed no signs of PE. Initially admitted to SICU due to unstable hemodynamics on 3 pressors. Transferred to MICU 8/22 after coming of pressors. Transferred to floor 8/23.    Today:  - Nephrology consulted  - Reduce ROMA intermittent draining to 400 mL q6h given GONZALO  - UA, urine Na, and renal US ordered  - Will increase daily albumin to 50 g of 5%. Will monitor Cr for indication of additional dosing  - NJT placed for TF for pre-opetative nutritional management    # Hepatic Cirrhosis Secondary to PSC/UC  Patient reevaluated for liver transplantation due to arrhythmias. Now relisted for transplant  - ROMA drain x1, reduce intermittent draining to 400 mL q6h given GONZALO  - Hepatology consulted, appreciate recs  - Hold home diuretics given ROMA output  - Continue PTA ursodiol   - Lactulose 20 BID ordered, PRN senna and miralax  - ERCP 8/30 with removal of sludge and stone as well as dilation and stenting. Will need to be preformed q4 weeks until transplanted  - NJT placed for TF for pre-opetative nutritional management    #. GONZALO (BL Creat 0.5-0.7)  Significant fluid output from ROMA with net negative fluid status. Likely prerenal GONZALO secondary to hypovolemia. Also consider  postrenal related to compression from ascites.   - Nephrology consulted  - ROMA drain plan as above  - UA, urine Na, and renal US ordered  - Will increase daily albumin to 50 g of 5%. Will monitor Cr for indication of additional dosing    #. Intraoperative V tach requiring 4 rounds of defibrillation   #. Distributive vs hypovolemic shock, resolved   Patient had wnl stress ECHO pre-op with EF 60-65%. Intraoperative v tach requiring defibrillation, came to SICU on 3 pressors. TTE post op 8/19 with global and regional left ventricular function is normal with an EF of 60-65%. Off pressors since 8/20.  - MAP goal > 55  - Amiodarone PO taper per EP  - Cards consulted, appreciate recs  - Cronary angiogram 8/25, without disease  - Cardiac MRI without remarkable findings     #. SIRS  Unclear if any infectious source. Initially thought to possibly be cholangitis but less likely per discussion with transplant hepatology. Greatest concern for an intraabdominal source. OR, blood, and sputum cultures with NGTD. EBV IgG positive but IgM not reactive. At this point low suspicion for infection.   - ID consulted  - CTM Cx NGTD, 8/30 Cx with GPC isolated only in broth (indicating likely contaminant)   - Continue CTX 1 g every day per hepatology    #. Post Operative Abdominal Pain  - Oxycodone 5 mg q4 PRN    #. Acute blood loss anemia    S/p transfusion: 3 units intraoperative   - Goals: Hb > 8, Fibrinogen >200, INR < 2, plts > 50  - Continue to trend Hb     #. Post operative respiratory failure, resolved   - Extubated, on RA    #. Stress hyperglycemia, resolved  In setting of steroid administration, resolved, sliding scale insulin discontinued    #. Deconditioning  - PT and OT consulted    #. Severe Malnutrition in the Context of Acute Illness  -Calorie counts ordered  -PRN ensure ordered  -Will consider Marinol   - NJT placed for TF for pre-opetative nutritional management     Diet: Snacks/Supplements Adult: Other; whatever patient  prefers; Between Meals  Combination Diet Regular Diet Adult; 2 gm NA Diet    DVT Prophylaxis: Enoxaparin (Lovenox) SQ  Jurado Catheter: Not present  Fluids: PO  Central Lines: PRESENT  CVC TRIPLE LUMEN Right Internal jugular-Site Assessment: WDL  Code Status: Full Code      Disposition Plan   Expected discharge: >3 days recommended to TBD once transplant evaluation completed.     The patient's care was discussed with the attending physician, Dr. Franks.    Augustine Pena MD  [place] [id number] Service  [facility]  Securely message with the Vocera Web Console (learn more here)  Text page via [facility] Paging/Directory  Please see sign in/sign out for up to date coverage information    Clinically Significant Risk Factors Present on Admission                ______________________________________________________________________    Interval History   NAEO. Patient was given additional 25 g 5% albumin overnight given Cr. Patient denies acute pain this AM. Also denies f/c. Continues to have good UOP. Patient happy that she has been relisted.    4 point ROS otherwise negative    Data reviewed today: I reviewed all medications, new labs and imaging results over the last 24 hours.    Physical Exam   Vital Signs: Temp: (!) 96.7  F (35.9  C) Temp src: Axillary BP: 101/58 Pulse: 61   Resp: 18 SpO2: 100 % O2 Device: None (Room air)    Weight: 124 lbs 12.8 oz  General: awake, sitting up, appropriately tearful when discussing cardiac MRI and transplant  HEENT:NC/AT, scleral icterus present    Pulm/Resp: no increased work of breathing, symmetric chest expansion   CV: RRR, S1. S2 present, no murmur appreciated   Abdomen: Soft, mildly distended, non-tender, no rebound tenderness or guarding, no masses. ROMA drain w/ serosanguinous fluids.   Incisions/Skin: no jaundice, abdominal incision c/d/i  MSK/Extremities: no edema, moving all extremities, calves soft, extremities well perfused    Data   Recent Labs    Lab 09/01/21  0553 08/31/21 2029 08/31/21  0740 08/30/21  0549   WBC 16.0*  --  17.1* 21.1*   HGB 7.9*  --  9.0* 9.6*   MCV 89  --  88 89     --  174 193   INR 1.48*  --  1.39* 1.61*   *  --  129* 131*   POTASSIUM 4.2  --  3.9 3.5   CHLORIDE 102  --  99 103   CO2 19*  --  17* 16*   BUN 50*  --  37* 21   CR 1.44* 1.46* 1.23* 0.54   ANIONGAP 10  --  13 12   MARIELENA 9.4  --  9.1 9.2   *  --  137* 100*   ALBUMIN 3.4  --  3.2* 3.0*   PROTTOTAL 5.7*  --  6.0* 5.6*   BILITOTAL 3.1*  --  5.5* 6.7*   ALKPHOS 204*  --  268* 276*   ALT 29  --  33 36   AST 30  --  34 37     Recent Results (from the past 24 hour(s))   US Renal Complete    Narrative    EXAMINATION: US RENAL COMPLETE, 9/1/2021 9:29 AM     COMPARISON: None.    HISTORY: GONZALO, significant ascites, c/f compression.    TECHNIQUE: The kidneys and bladder were scanned in the standard  fashion with specialized ultrasound transducer(s) using both gray  scale and limited color/spectral Doppler techniques. Study was limited  by diffuse shadowing secondary to bowel gas burden.    FINDINGS:    Right kidney: Measures 11.9 cm in length. Parenchyma is of normal  thickness and increased echogenicity. No focal mass. No  hydronephrosis.    Left kidney: Measures 12.1 cm in length. Parenchyma is of normal  thickness and increased echogenicity. No focal mass. No  hydronephrosis.     Bladder: Not visualized.      Impression    IMPRESSION:  1.  Suboptimal visualization of the kidneys, smaller lesions may not  be visualized due to artifact.  2.  Increased bilateral renal cortical echogenicity suggestive of  medical renal disease.    I have personally reviewed the examination and initial interpretation  and I agree with the findings.    MARTINEZ JAVED MD         SYSTEM ID:  [id number]     Medications       albumin human  50 g Intravenous Daily     amiodarone  200 mg Oral Daily     cefTRIAXone  1 g Intravenous Q24H     enoxaparin ANTICOAGULANT  40 mg Subcutaneous Q24H      [Held by provider] furosemide  40 mg Oral Daily     heparin lock flush  5-20 mL Intracatheter Q24H     lactulose  20 g Oral BID     lidocaine (PF)  10 mL Subcutaneous Once     sodium chloride (PF)  10-40 mL Intracatheter Q8H     sodium chloride (PF)  10-40 mL Intracatheter Q8H     [Held by provider] spironolactone  100 mg Oral Daily     ursodiol  300 mg Oral BID

## 2021-09-01 NOTE — PLAN OF CARE
Pt went down to Renal U/S and had feeding tube placement in general x-ray today. Feeding tube placement located in duodenojejunal junction. Plan for tube feeding . Waiting for Nutrition consult. Pt able to take oral food intake on her own as well.     Pt received 4 bags of albumin (total of 50g )today.  Creatinine 1.44.     She continues to have leakage around ROMA site. Soaked saturated gauze pad. Dressing changed completed and it will need to be change again. Pt has ROMA drain . It is to be drain q. 4 hrs and max of 400ml to be drain out.     Still need to collect urine sample. Pt aware of this.     Telemetry monitoring showing NSR.

## 2021-09-01 NOTE — PROGRESS NOTES
CLINICAL NUTRITION SERVICES - REASSESSMENT NOTE     Nutrition Prescription    RECOMMENDATIONS FOR MDs/PROVIDERS TO ORDER:  None today    Malnutrition Status:    Severe malnutrition in the context of acute illness     Recommendations already ordered by Registered Dietitian (RD):  Osmolite 1.5 Chris @ goal of  45ml/hr  (1080ml/day)  will provide: 1620 kcals (29 kcal/kg), 68 g PRO (1.2 g/kg), 823 ml free H20, 220 g CHO, and 0 g fiber daily. + 2 pkts ProSource per day. Total provisions: 1700 kcal (30 kcal/kg) and 90 g PRO (1.6 g/kg).  - Initiate @ 15 ml/hr and advance by 10 ml q8hr pending pt's tolerance  - Do not start or advance TF rate unless Mg++ >1.5, K+ is >3, and phos >1.9  - Recommend 30 ml q4hr fluid flushes for tube patency. Additional fluids and/or adjustments per MD.    - Order multivitamin/mineral (15 ml/day via FT) to help ensure micronutrient needs being met with suspected hypermetabolic demands and potential interruptions to TF infusions.  Future/Additional Recommendations:  Once tolerating goal continuous feed consider cycling: osmolite 1.5 via @ 90 mL/hr x 12 hrs  (1080ml/day)  will provide: 1620 kcals (29 kcal/kg), 68 g PRO (1.2 g/kg), 823 ml free H20, 220 g CHO, and 0 g fiber daily. + 2 pkts ProSource per day. Total provisions: 1700 kcal (30 kcal/kg) and 90 g PRO (1.6 g/kg)..    Adjust EN pending calorie count      REASON FOR ASSESSMENT  Berta Nava is a/an 56 year old female assessed by the dietitian for Provider Order - Registered Dietitian to Assess and Order TF per Medical Nutrition Therapy Protocol    EVALUATION OF THE PROGRESS TOWARD GOALS   Diet: 2 g Sodium, any supplement PRN  Intake:   Calorie Counts    8/27       Total Kcals: 0           Total Protein: 0g-2 meals ordered, 0 recorded   8/28       Total Kcals: 0           Total Protein: 0g-2 meals ordered, 0 recorded   8/29       Total Kcals: 0           Total Protein: 0g-2 meals ordered, 0 recorded   Unable to assess needs d/t missing  data. Pt has been consuming 50-75% of meals per flowsheet and ordering 1-3 meals/day per healthtouch     NEW FINDINGS   NJ placed today and position verified. Spoke to pt and family member in room. Stated intakes were initially good but have decreased recently d/t being anxious about upcoming testes. Stated she ate 100% of an omelet and 1 ensure today. Pt and family member stated this has been how much she has been eating over the past few days (~550kcal and 37g protein). Will provide lower end of estimated calorie needs via EN (as pt is taking some PO) and restart calorie count. RD to adjust EN pending results    Labs:   K 4.2 (WNL)  Phos 3.5 (WNL)  Mg 2.4 (H)  Na 131 (L)    Wt Readings from Last 10 Encounters:   08/31/21 55.7 kg (122 lb 14.4 oz)   08/24/21 68 kg (149 lb 14.6 oz)   08/22/21 65.5 kg (144 lb 6.4 oz)   08/20/21 63.1 kg (139 lb 1.8 oz)   08/18/21 59.5 kg (131 lb 2.8 oz)   08/16/21 62.6 kg (138 lb)-admit wt   07/22/21 59.4 kg (131 lb)   06/25/21 61.1 kg (134 lb 11.2 oz)   06/21/21 65.5 kg (144 lb 8 oz)   06/12/21 68.4 kg (150 lb 14.4 oz)   05/10/21 53.7 kg (118 lb 6.4 oz)   05/03/21 76.7 kg (169 lb 3.2 oz)   02/22/21 72.6 kg (160 lb)   05/17/18 74.6 kg (164 lb 6.4 oz)      Dosing Weight: 55.7 kg (lowest wt since admit 8/31)     ASSESSED NUTRITION NEEDS-updated   Estimated Energy Needs: 0625-7552 kcals/day (30 - 35 kcals/kg )  Justification: Increased needs post-SOT   Estimated Protein Needs:  grams protein/day (1.5 - 2 grams of pro/kg)  Justification: Increased needs post-SOT   Estimated Fluid Needs: 1 mL/kcal  Justification: Maintenance    MALNUTRITION  % Intake: </= 50% for >/= 5 days (severe)  % Weight Loss: > 10% in 6 months (severe)  Subcutaneous Fat Loss: Facial region:  moderate  Muscle Loss: Temporal:  severe, Thoracic region (clavicle, acromium bone, deltoid, trapezius, pectoral):  severe, Upper arm (bicep, tricep):  moderate and Lower arm  (forearm):  Moderate   Fluid Accumulation/Edema:  ascites   Malnutrition Diagnosis: Severe malnutrition in the context of acute illness     Previous Goals   1. Ave po intakes per calorie counts x 3 days to meet at least 75% of pt's nutritional needs (1350 kcals and 65 g PRO) vs need for nutritional support   Evaluation: Unable to assess, likely not met    Previous Nutrition Diagnosis  Inadequate protein-energy intake related to suspect inconsistent po intakes d/t multiple interruptions to diet order and TF therapy was not initiates as evidenced by po intakes of < 50% over the past week and h/o eight loss of 8.9 kg (13%) over the past ~2 months  Evaluation: No change    CURRENT NUTRITION DIAGNOSIS  Inadequate protein-energy intake related to suspect inconsistent po intakes d/t multiple interruptions to diet order and TF therapy was not initiates as evidenced by po intakes of < 50% over the past week and h/o weight loss of 38# (23.7%) over the past 6 months    INTERVENTIONS  Implementation  Calorie count  Enteral Nutrition - Initiate    Goals  Total avg nutritional intake to meet a minimum of 30 kcal/kg and 1.5 g PRO/kg daily (per dosing wt 55.7 kg).    Monitoring/Evaluation  Progress toward goals will be monitored and evaluated per protocol.    Isabel Ovalle MS, RD, LDN  Pager: [phone]

## 2021-09-01 NOTE — PLAN OF CARE
[id number]  VSS. On telly showing NSR. Alert and oriented. No complaints of pain. Creatinine recheck at 2030 resulted at 1.46. MD paged, 25 g albumin ordered and administered. Pt tolerated well. No acute events, continue with POC.

## 2021-09-01 NOTE — CONSULTS
Nephrology Initial Consult  September 1, 2021      Berta Nava MRN:[id number] YOB: 1965  Date of Admission:8/18/2021  Primary care provider: No Ref-Primary, Physician  Requesting physician: Reginald Franks, *    ASSESSMENT AND RECOMMENDATIONS:     # GONZALO was chronologically preceded by large volume ascites drainage.  This was also associated with period of relative hypotension.  Although the hypotension was relatively mild, this occurred in the setting of spironolactone, which may have augmented the effect on renal perfusion. Cr seems to have stabilized today following administration of albumin by primary team, and UOP is increasing (450 from 100 yesterday). No sign of obstruction on renal US.  - Agree with albumin administration  - Agree with judicious future removal of ascitic fluid  - UA and urine Na    Recommendations were communicated to primary team via page.    Seen and discussed with Dr. Edin Christianson, MS4  [facility]    Attestation:  This patient was evaluated by me, Favian Jesus MD on September 1, 2021 jointly with Alan Christianson and agree with the findings and plan in this note.  I have reviewed  Medications, Vital Signs, Labs and Imaging.  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 The patient went into hypotensive VTACH during the surgery, requiring 5-6 defibrillations and multiple pressors. The patient has since recovered well, workup for VTACH has been unrevealing, and she was placed on the transplant list again as of 8/31. Nephrology was consulted  for new GONZALO on 8/31.    Per chart review, patient's baseline creatinine ranges 0.6-0.9. Patient did experience a rise in creatinine following VTACH to 1.23, though this quickly recovered to baseline. Patient more recently had a rise in creatinine from 0.54 8/30 -> 1.46 the evening of 8/31. Of note, patient had 3.3L of ascites removed via ROMA drain on 8/30. Patient reported subjective decrease of UOP following removal of ascites, but feels like UOP has been increased today.    PAST MEDICAL HISTORY:  Reviewed   Past Medical History:   Diagnosis Date     Ascites      Biliary cirrhosis (H)      Cholangitis, sclerosing      Cirrhosis of liver with ascites (H) 3/3/2021     Hearing loss of left ear     wears a hearing aide     History of low potassium      Hyperlipidemia      Hypertension      SBP (spontaneous bacterial peritonitis) (H) 4/30/2021     Sjogren's syndrome (H)      Ulcerative colitis (H)      Ulcerative pancolitis (H)        Past Surgical History:   Procedure Laterality Date     CV CORONARY ANGIOGRAM N/A 8/25/2021    Procedure: Coronary Angiogram with possible intervention;  Surgeon: David Wilhelm MD;  Location:  [facility]     ENDOSCOPIC RETROGRADE CHOLANGIOPANCREATOGRAM N/A 6/25/2021    Procedure: ENDOSCOPIC RETROGRADE CHOLANGIOPANCREATOGRAPHY with ballon sweep of bile ducts for stones, balloon dilation of bile ducts and bile duct stent placement;  Surgeon: Gregory Gabriel MD;  Location:  OR     ENDOSCOPIC RETROGRADE CHOLANGIOPANCREATOGRAM N/A 7/22/2021    Procedure: ENDOSCOPIC RETROGRADE CHOLANGIOPANCREATOGRAPHY STONE REMOVAL, DILATION AND STENT PLACEMENT;  Surgeon: Gregory Gabriel MD;  Location:  OR     ENDOSCOPIC RETROGRADE CHOLANGIOPANCREATOGRAPHY, EXCHANGE TUBE/STENT N/A 05/05/2021    Procedure: ENDOSCOPIC RETROGRADE CHOLANGIOPANCREATOGRAPHY WITH STENT EXCHANGE, STONE EXTRACTION, AND DILATION;  Surgeon: Gregory Gabriel MD;  Location:  OR     ENDOSCOPIC RETROGRADE  CHOLANGIOPANCREATOGRAPHY, EXCHANGE TUBE/STENT N/A 5/10/2021    Procedure: ENDOSCOPIC RETROGRADE CHOLANGIOPANCREATOGRAPHY with biliary dilation, stone removal, stent exchange;  Surgeon: Gregory Gabriel MD;  Location: [facility]     ENDOSCOPIC RETROGRADE CHOLANGIOPANCREATOGRAPHY, EXCHANGE TUBE/STENT N/A 6/10/2021    Procedure: ENDOSCOPIC RETROGRADE CHOLANGIOPANCREATOGRAPHY, WITH biliary stent exchange, stone removal;  Surgeon: Woodrow Lott MD;  Location: [facility] OR     ENDOSCOPIC RETROGRADE CHOLANGIOPANCREATOGRAPHY, EXCHANGE TUBE/STENT N/A 2021    Procedure: ENDOSCOPIC RETROGRADE CHOLANGIOPANCREATOGRAPHY with biliary dilation, stone removal, stent exchange;  Surgeon: Gregory Gabriel MD;  Location: [facility] OR     GYN SURGERY      bilat fallopian tubes and ovaries removed     PICC DOUBLE LUMEN PLACEMENT Right 2021    42cm (2cm external), Lateral brachial vein     TRANSPLANT LIVER RECIPIENT  DONOR N/A 2021    Procedure: Opening of abdomen, Abdominal Exploration and aborted liver transplant.;  Surgeon: Ernesto Schmitt MD;  Location: [facility] OR        MEDICATIONS:  PTA Meds  Prior to Admission medications    Medication Sig Last Dose Taking? Auth Provider   calcium carbonate 600 mg-vitamin D 400 units (CALTRATE) 600-400 MG-UNIT per tablet Take 1 tablet by mouth every evening  2021 at Unknown time Yes Unknown, Entered By History   furosemide (LASIX) 20 MG tablet Take 2 tablets (40 mg) by mouth daily 2021 at am Yes Leventhal, Thomas Michael, MD   mesalamine (LIALDA) 1.2 g EC tablet Take 2 tablets (2,400 mg) by mouth daily (with breakfast) 2021 at am Yes Leventhal, Thomas Michael, MD   multivitamin (CENTRUM SILVER) tablet Take 1 tablet by mouth daily 2021 at am Yes Unknown, Entered By History   potassium chloride ER (MICRO-K) 10 MEQ CR capsule Take 10 mEq by mouth At Bedtime 2021 at pm Yes Reported, Patient   simvastatin (ZOCOR) 20 MG tablet Take 20 mg by mouth At Bedtime 2021 at  pm Yes Unknown, Entered By History   spironolactone (ALDACTONE) 50 MG tablet Take 2 tablets (100 mg) by mouth daily 8/18/2021 at am Yes Leventhal, Thomas Michael, MD   ursodiol (ACTIGALL) 300 MG capsule Take 1 capsule (300 mg) by mouth 2 times daily 8/18/2021 at am Yes Leventhal, Thomas Michael, MD      Current Meds    albumin human  50 g Intravenous Daily     amiodarone  200 mg Oral Daily     cefTRIAXone  1 g Intravenous Q24H     enoxaparin ANTICOAGULANT  40 mg Subcutaneous Q24H     [Held by provider] furosemide  40 mg Oral Daily     heparin lock flush  5-20 mL Intracatheter Q24H     lactulose  20 g Oral BID     lidocaine (PF)  10 mL Subcutaneous Once     multivitamins w/minerals  15 mL Per Feeding Tube Daily     protein modular  1 packet Per Feeding Tube BID     sodium chloride (PF)  10-40 mL Intracatheter Q8H     sodium chloride (PF)  10-40 mL Intracatheter Q8H     [Held by provider] spironolactone  100 mg Oral Daily     ursodiol  300 mg Oral BID     Infusion Meds    dextrose         ALLERGIES:    Allergies   Allergen Reactions     Diagnostic X-Ray Materials Hives     PATIENT HAD HIVE REACTION AFTER ADMINISTERING CT CONTRAST DYE.       Contrast Dye        REVIEW OF SYSTEMS:  A comprehensive of systems was negative except as noted above.    SOCIAL HISTORY:   Social History     Socioeconomic History     Marital status:      Spouse name: Not on file     Number of children: Not on file     Years of education: Not on file     Highest education level: Not on file   Occupational History     Not on file   Tobacco Use     Smoking status: Never Smoker     Smokeless tobacco: Never Used   Substance and Sexual Activity     Alcohol use: No     Comment: Last drink was 2017     Drug use: No     Sexual activity: Not on file   Other Topics Concern     Parent/sibling w/ CABG, MI or angioplasty before 65F 55M? Not Asked   Social History Narrative     Not on file     Social Determinants of Health     Financial Resource Strain:  "     Difficulty of Paying Living Expenses:    Food Insecurity:      Worried About Running Out of Food in the Last Year:      Ran Out of Food in the Last Year:    Transportation Needs:      Lack of Transportation (Medical):      Lack of Transportation (Non-Medical):    Physical Activity:      Days of Exercise per Week:      Minutes of Exercise per Session:    Stress:      Feeling of Stress :    Social Connections:      Frequency of Communication with Friends and Family:      Frequency of Social Gatherings with Friends and Family:      Attends Sikh Services:      Active Member of Clubs or Organizations:      Attends Club or Organization Meetings:      Marital Status:    Intimate Partner Violence:      Fear of Current or Ex-Partner:      Emotionally Abused:      Physically Abused:      Sexually Abused:      Reviewed    FAMILY MEDICAL HISTORY:   Family History   Problem Relation Age of Onset     Cancer Mother         angiosarcoma     Coronary Artery Disease Early Onset Father         MI age 46     Heart Transplant Father      Liver Disease No family hx of      Ulcerative Colitis No family hx of      Crohn's Disease No family hx of      Reviewed    PHYSICAL EXAM:   Temp  Av.1  F (36.7  C)  Min: 95  F (35  C)  Max: 100.2  F (37.9  C)  Arterial Line BP  Min: 12/  Max: 235/230  Arterial Line MAP (mmHg)  Av.8 mmHg  Min: 1 mmHg  Max: 233 mmHg      Pulse  Av.3  Min: 54  Max: 109 Resp  Av  Min: 10  Max: 121  FiO2 (%)  Av.6 %  Min: 30 %  Max: 60 %  SpO2  Av.9 %  Min: 92 %  Max: 100 %    CVP (mmHg): 10 mmHg  BP 92/50 (BP Location: Right arm)   Pulse 72   Temp (!) 96.1  F (35.6  C) (Oral)   Resp 18   Ht 1.651 m (5' 5\")   Wt 56.6 kg (124 lb 12.8 oz)   SpO2 100%   BMI 20.77 kg/m     Date 21 07 - 21 0659   Shift 3096-2521 0407-1200 1160-8775 24 Hour Total   INTAKE   Colloid 750 250  1000   Shift Total(mL/kg) 750(13.25) 250(4.42)  1000(17.67)   OUTPUT   Urine 150 100  250 "   Drains 400 350  750   Shift Total(mL/kg) 550(9.72) 450(7.95)  1000(17.67)   Weight (kg) 56.61 56.61 56.61 56.61      Admit Weight: 59.5 kg (131 lb 2.8 oz)     GENERAL APPEARANCE: sitting at edge of bed unsupported, no acute distress  EYES: slight scleral icterus, pupils equal  Pulmonary: lungs clear to auscultation with equal breath sounds bilaterally  CV: regular rhythm, normal rate, no rub  GI: soft, nontender, normal bowel sounds  MS: no evidence of inflammation in joints, no muscle tenderness  : no ferguson  SKIN: no rash, warm, dry, no cyanosis  NEURO: face symmetric, moves all extremities equally    LABS:   CMP  Recent Labs   Lab 09/01/21 0553 08/31/21 2029 08/31/21 0740 08/30/21  0549 08/29/21  0542   *  --  129* 131* 131*   POTASSIUM 4.2  --  3.9 3.5 3.9   CHLORIDE 102  --  99 103 103   CO2 19*  --  17* 16* 19*   ANIONGAP 10  --  13 12 9   *  --  137* 100* 80   BUN 50*  --  37* 21 19   CR 1.44* 1.46* 1.23* 0.54 0.53   GFRESTIMATED 40* 39* 48* >90 >90   MARIELENA 9.4  --  9.1 9.2 8.4*   MAG 2.4*  --  2.4* 2.2 2.0   PHOS 3.5  --  3.3 3.3 3.0   PROTTOTAL 5.7*  --  6.0* 5.6* 5.3*   ALBUMIN 3.4  --  3.2* 3.0* 2.8*   BILITOTAL 3.1*  --  5.5* 6.7* 6.2*   ALKPHOS 204*  --  268* 276* 244*   AST 30  --  34 37 39   ALT 29  --  33 36 37     CBC  Recent Labs   Lab 09/01/21 0553 08/31/21 0740 08/30/21 0549 08/29/21  0542   HGB 7.9* 9.0* 9.6* 8.8*   WBC 16.0* 17.1* 21.1* 16.2*   RBC 2.69* 2.99* 3.21* 2.96*   HCT 24.0* 26.4* 28.7* 26.8*   MCV 89 88 89 91   MCH 29.4 30.1 29.9 29.7   MCHC 32.9 34.1 33.4 32.8   RDW 17.4* 16.6* 16.7* 16.7*    174 193 148*     INR  Recent Labs   Lab 09/01/21  0553 08/31/21  0740 08/30/21  0549 08/29/21  0542   INR 1.48* 1.39* 1.61* 1.64*     ABGNo lab results found in last 7 days.   URINE STUDIES  Recent Labs   Lab Test 08/19/21  2135 08/19/21  0513 06/10/21  0632 05/21/21  0730   COLOR Yellow Dark Yellow* Yellow Dark Yellow   APPEARANCE Slightly Cloudy* Clear Clear Clear    URINEGLC Negative Negative Negative Negative   URINEBILI Small* Small* Negative Negative   URINEKETONE Negative Negative Negative Negative   SG 1.025 1.019 1.029 1.031   UBLD Trace* Negative Negative Negative   URINEPH 5.0 5.5 5.5 6.0   PROTEIN 10 * Negative 10* 20*   NITRITE Negative Negative Negative Negative   LEUKEST Negative Negative Negative Negative   RBCU 6* <1 2 1   WBCU 2 1 1 4     Recent Labs   Lab Test 05/21/21  0730   UTPG 0.26*     PTH  No lab results found.  IRON STUDIES  Recent Labs   Lab Test 08/26/21  1313   IMAN 161       IMAGING:  Renal ultrasound:   No masses, cysts, stones, hydronephrosis or significant abnormalities seen  Kidneys:   1.  Suboptimal visualization of the kidneys, smaller lesions may not  be visualized due to artifact.  2.  Increased bilateral renal cortical echogenicity suggestive of  medical renal disease.        Alan Christianson, MS4  [facility] [employer]

## 2021-09-01 NOTE — PLAN OF CARE
[id number]: BP soft 90s/50s, OVSS pt on RA. Pt denies nausea, dyspnea or pain. Pt on Tele. Abdominal staples intact; gerardo wound with erythema. ROMA with pink output with a small clot noted; 250 mL drained.  ROMA site sutures intact. ROMA to have a max of 2.4-2.5L/day to be emptied. Dressing saturated X1 and changed. Pt emotional and anxious about pending MRI results; remaining hopeful. Emotional support provided to patient. Cr to be rechecked at 1900; pending result. If cr not <1 per primary team may need more albumin. Pt voided 150 mL, will continue to monitor UOP. Pt dgt at bedside, went outside this evening. Continue POC.

## 2021-09-02 DIAGNOSIS — Z11.59 ENCOUNTER FOR SCREENING FOR OTHER VIRAL DISEASES: ICD-10-CM

## 2021-09-02 PROBLEM — N17.9 ACUTE KIDNEY FAILURE, UNSPECIFIED (H): Status: ACTIVE | Noted: 2021-09-02

## 2021-09-02 LAB
ALBUMIN SERPL-MCNC: 3.9 G/DL (ref 3.4–5)
ALP SERPL-CCNC: 206 U/L (ref 40–150)
ALT SERPL W P-5'-P-CCNC: 30 U/L (ref 0–50)
ANION GAP SERPL CALCULATED.3IONS-SCNC: 10 MMOL/L (ref 3–14)
AST SERPL W P-5'-P-CCNC: 32 U/L (ref 0–45)
BILIRUB DIRECT SERPL-MCNC: 2.6 MG/DL (ref 0–0.2)
BILIRUB SERPL-MCNC: 3.2 MG/DL (ref 0.2–1.3)
BUN SERPL-MCNC: 56 MG/DL (ref 7–30)
CALCIUM SERPL-MCNC: 9.5 MG/DL (ref 8.5–10.1)
CHLORIDE BLD-SCNC: 104 MMOL/L (ref 94–109)
CO2 SERPL-SCNC: 20 MMOL/L (ref 20–32)
CREAT SERPL-MCNC: 1.21 MG/DL (ref 0.52–1.04)
CREAT SERPL-MCNC: 1.46 MG/DL (ref 0.52–1.04)
ERYTHROCYTE [DISTWIDTH] IN BLOOD BY AUTOMATED COUNT: 17.7 % (ref 10–15)
GFR SERPL CREATININE-BSD FRML MDRD: 39 ML/MIN/1.73M2
GFR SERPL CREATININE-BSD FRML MDRD: 49 ML/MIN/1.73M2
GLUCOSE BLD-MCNC: 91 MG/DL (ref 70–99)
GLUCOSE BLDC GLUCOMTR-MCNC: 93 MG/DL (ref 70–99)
GLUCOSE BLDC GLUCOMTR-MCNC: 94 MG/DL (ref 70–99)
HCT VFR BLD AUTO: 23.7 % (ref 35–47)
HGB BLD-MCNC: 8.1 G/DL (ref 11.7–15.7)
INR PPP: 1.48 (ref 0.85–1.15)
LACTATE SERPL-SCNC: 1.5 MMOL/L (ref 0.7–2)
MAGNESIUM SERPL-MCNC: 2.5 MG/DL (ref 1.6–2.3)
MCH RBC QN AUTO: 29.8 PG (ref 26.5–33)
MCHC RBC AUTO-ENTMCNC: 34.2 G/DL (ref 31.5–36.5)
MCV RBC AUTO: 87 FL (ref 78–100)
PHOSPHATE SERPL-MCNC: 3.6 MG/DL (ref 2.5–4.5)
PLATELET # BLD AUTO: 200 10E3/UL (ref 150–450)
POTASSIUM BLD-SCNC: 3.7 MMOL/L (ref 3.4–5.3)
PROT SERPL-MCNC: 6.2 G/DL (ref 6.8–8.8)
RBC # BLD AUTO: 2.72 10E6/UL (ref 3.8–5.2)
SODIUM SERPL-SCNC: 134 MMOL/L (ref 133–144)
WBC # BLD AUTO: 14.3 10E3/UL (ref 4–11)

## 2021-09-02 PROCEDURE — 82565 ASSAY OF CREATININE: CPT | Performed by: STUDENT IN AN ORGANIZED HEALTH CARE EDUCATION/TRAINING PROGRAM

## 2021-09-02 PROCEDURE — 250N000011 HC RX IP 250 OP 636: Performed by: STUDENT IN AN ORGANIZED HEALTH CARE EDUCATION/TRAINING PROGRAM

## 2021-09-02 PROCEDURE — 82248 BILIRUBIN DIRECT: CPT | Performed by: STUDENT IN AN ORGANIZED HEALTH CARE EDUCATION/TRAINING PROGRAM

## 2021-09-02 PROCEDURE — 99233 SBSQ HOSP IP/OBS HIGH 50: CPT | Mod: GC | Performed by: INTERNAL MEDICINE

## 2021-09-02 PROCEDURE — P9047 ALBUMIN (HUMAN), 25%, 50ML: HCPCS | Performed by: STUDENT IN AN ORGANIZED HEALTH CARE EDUCATION/TRAINING PROGRAM

## 2021-09-02 PROCEDURE — 36592 COLLECT BLOOD FROM PICC: CPT | Performed by: STUDENT IN AN ORGANIZED HEALTH CARE EDUCATION/TRAINING PROGRAM

## 2021-09-02 PROCEDURE — 120N000002 HC R&B MED SURG/OB UMMC

## 2021-09-02 PROCEDURE — 82040 ASSAY OF SERUM ALBUMIN: CPT | Performed by: STUDENT IN AN ORGANIZED HEALTH CARE EDUCATION/TRAINING PROGRAM

## 2021-09-02 PROCEDURE — 250N000013 HC RX MED GY IP 250 OP 250 PS 637: Performed by: INTERNAL MEDICINE

## 2021-09-02 PROCEDURE — 85027 COMPLETE CBC AUTOMATED: CPT | Performed by: STUDENT IN AN ORGANIZED HEALTH CARE EDUCATION/TRAINING PROGRAM

## 2021-09-02 PROCEDURE — 84100 ASSAY OF PHOSPHORUS: CPT | Performed by: STUDENT IN AN ORGANIZED HEALTH CARE EDUCATION/TRAINING PROGRAM

## 2021-09-02 PROCEDURE — 99232 SBSQ HOSP IP/OBS MODERATE 35: CPT | Mod: GC | Performed by: INTERNAL MEDICINE

## 2021-09-02 PROCEDURE — 250N000011 HC RX IP 250 OP 636

## 2021-09-02 PROCEDURE — 82310 ASSAY OF CALCIUM: CPT | Performed by: STUDENT IN AN ORGANIZED HEALTH CARE EDUCATION/TRAINING PROGRAM

## 2021-09-02 PROCEDURE — 250N000013 HC RX MED GY IP 250 OP 250 PS 637: Performed by: STUDENT IN AN ORGANIZED HEALTH CARE EDUCATION/TRAINING PROGRAM

## 2021-09-02 PROCEDURE — P9041 ALBUMIN (HUMAN),5%, 50ML: HCPCS | Performed by: STUDENT IN AN ORGANIZED HEALTH CARE EDUCATION/TRAINING PROGRAM

## 2021-09-02 PROCEDURE — 250N000013 HC RX MED GY IP 250 OP 250 PS 637: Performed by: NURSE PRACTITIONER

## 2021-09-02 PROCEDURE — 85610 PROTHROMBIN TIME: CPT | Performed by: STUDENT IN AN ORGANIZED HEALTH CARE EDUCATION/TRAINING PROGRAM

## 2021-09-02 PROCEDURE — 83735 ASSAY OF MAGNESIUM: CPT | Performed by: STUDENT IN AN ORGANIZED HEALTH CARE EDUCATION/TRAINING PROGRAM

## 2021-09-02 PROCEDURE — 36592 COLLECT BLOOD FROM PICC: CPT | Performed by: INTERNAL MEDICINE

## 2021-09-02 PROCEDURE — P9041 ALBUMIN (HUMAN),5%, 50ML: HCPCS

## 2021-09-02 PROCEDURE — 250N000011 HC RX IP 250 OP 636: Performed by: NURSE PRACTITIONER

## 2021-09-02 PROCEDURE — 83605 ASSAY OF LACTIC ACID: CPT | Performed by: INTERNAL MEDICINE

## 2021-09-02 PROCEDURE — U0005 INFEC AGEN DETEC AMPLI PROBE: HCPCS | Performed by: STUDENT IN AN ORGANIZED HEALTH CARE EDUCATION/TRAINING PROGRAM

## 2021-09-02 RX ORDER — ALBUMIN (HUMAN) 12.5 G/50ML
50 SOLUTION INTRAVENOUS ONCE
Status: COMPLETED | OUTPATIENT
Start: 2021-09-02 | End: 2021-09-02

## 2021-09-02 RX ORDER — POTASSIUM CHLORIDE 750 MG/1
10 TABLET, EXTENDED RELEASE ORAL ONCE
Status: COMPLETED | OUTPATIENT
Start: 2021-09-02 | End: 2021-09-02

## 2021-09-02 RX ADMIN — ENOXAPARIN SODIUM 40 MG: 100 INJECTION SUBCUTANEOUS at 08:52

## 2021-09-02 RX ADMIN — LACTULOSE 20 G: 10 POWDER, FOR SOLUTION ORAL at 10:56

## 2021-09-02 RX ADMIN — URSODIOL 300 MG: 300 CAPSULE ORAL at 19:55

## 2021-09-02 RX ADMIN — ALBUMIN HUMAN 25 G: 0.05 INJECTION, SOLUTION INTRAVENOUS at 00:38

## 2021-09-02 RX ADMIN — Medication 1 PACKET: at 19:55

## 2021-09-02 RX ADMIN — Medication: at 15:03

## 2021-09-02 RX ADMIN — OXYCODONE HYDROCHLORIDE 5 MG: 5 TABLET ORAL at 15:01

## 2021-09-02 RX ADMIN — Medication 5 ML: at 17:18

## 2021-09-02 RX ADMIN — LACTULOSE 20 G: 10 POWDER, FOR SOLUTION ORAL at 19:53

## 2021-09-02 RX ADMIN — ALBUMIN HUMAN 50 G: 0.05 INJECTION, SOLUTION INTRAVENOUS at 12:37

## 2021-09-02 RX ADMIN — Medication 1 PACKET: at 08:59

## 2021-09-02 RX ADMIN — URSODIOL 300 MG: 300 CAPSULE ORAL at 08:52

## 2021-09-02 RX ADMIN — ALBUMIN HUMAN 50 G: 0.25 SOLUTION INTRAVENOUS at 10:33

## 2021-09-02 RX ADMIN — Medication 15 ML: at 08:52

## 2021-09-02 RX ADMIN — POTASSIUM CHLORIDE 10 MEQ: 750 TABLET, EXTENDED RELEASE ORAL at 08:52

## 2021-09-02 RX ADMIN — AMIODARONE HYDROCHLORIDE 200 MG: 200 TABLET ORAL at 08:52

## 2021-09-02 RX ADMIN — CEFTRIAXONE 1 G: 1 INJECTION, POWDER, FOR SOLUTION INTRAMUSCULAR; INTRAVENOUS at 16:29

## 2021-09-02 ASSESSMENT — ACTIVITIES OF DAILY LIVING (ADL)
ADLS_ACUITY_SCORE: 16

## 2021-09-02 ASSESSMENT — MIFFLIN-ST. JEOR: SCORE: 1145.15

## 2021-09-02 NOTE — PROGRESS NOTES
[facility]    Progress Note - Cassie 4 Service        Date of Admission:  8/18/2021    Assessment & Plan        Bertaalfredo Nava is a 55 year old female with 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 She presented in stable condition from home for potential liver transplant. Patient was taken to the OR. Intraop, abdomen was opened, ascites was evacuated and falciform and left triangular ligament divided. At this time, patient developed vtach requiring 5-6 rounds of debrillation and the procedure was aborted. Consideration for starting ECMO, but patient converted out. Patient incisions closed. Taken to CT for concern for PE. CT showed no signs of PE. Initially admitted to SICU due to unstable hemodynamics on 3 pressors. Transferred to MICU 8/22 after coming of pressors. Transferred to floor 8/23.    Today:  - Continue daily albumin at 50 g of 5%. Will monitor Cr for indication of additional dosing  - Will also give patient 50 g of 25 % albumin this AM  - Pako Roque for low back pain  - Actively listed for liver transplant     # Hepatic Cirrhosis Secondary to PSC/UC  Patient reevaluated for liver transplantation due to arrhythmias. Now relisted for transplant  - ROMA drain x1, intermittent draining at 400 mL q6h   - Hepatology consulted, appreciate recs  - Hold home diuretics given ROMA output  - Continue PTA ursodiol   - Lactulose 20 BID ordered, PRN senna and miralax  - ERCP 8/30 with removal of sludge and stone as well as dilation and stenting. Will need to be preformed q4 weeks until transplanted  - NJT placed for TF for pre-opetative nutritional management    #. GONZALO (BL Creat 0.5-0.7)  Significant fluid output from ROMA with net negative fluid status. Likely prerenal GONZALO secondary to hypovolemia. Also consider postrenal related to compression from ascites.   - Nephrology consulted  - ROMA  drain plan as above  - UA, urine Na, and renal US ordered  - Continue daily albumin at 50 g of 5%. Will monitor Cr for indication of additional dosing  - Will also give patient 50 g of 25 % albumin this AM    #. Intraoperative V tach requiring 4 rounds of defibrillation   #. Distributive vs hypovolemic shock, resolved   Patient had wnl stress ECHO pre-op with EF 60-65%. Intraoperative v tach requiring defibrillation, came to SICU on 3 pressors. TTE post op 8/19 with global and regional left ventricular function is normal with an EF of 60-65%. Off pressors since 8/20.  - MAP goal > 55  - Amiodarone PO taper per EP  - Cards consulted, appreciate recs  - Cronary angiogram 8/25, without disease  - Cardiac MRI without remarkable findings     #. SIRS  Unclear if any infectious source. Initially thought to possibly be cholangitis but less likely per discussion with transplant hepatology. Greatest concern for an intraabdominal source. OR, blood, and sputum cultures with NGTD. EBV IgG positive but IgM not reactive. At this point low suspicion for infection.   - ID consulted  - CTM Cx NGTD, 8/30 Cx with GPC isolated only in broth (indicating likely contaminant)   - Continue CTX 1 g every day per hepatology    #. Post Operative Abdominal Pain  - Oxycodone 5 mg q4 PRN    #. Low Back Pain  -PRN Pako Roque    #. Acute blood loss anemia    S/p transfusion: 3 units intraoperative   - Goals: Hb > 8, Fibrinogen >200, INR < 2, plts > 50  - Continue to trend Hb     #. Post operative respiratory failure, resolved   - Extubated, on RA    #. Stress hyperglycemia, resolved  In setting of steroid administration, resolved, sliding scale insulin discontinued    #. Deconditioning  - PT and OT consulted    #. Severe Malnutrition in the Context of Acute Illness  -Calorie counts ordered  -PRN ensure ordered  -Will consider Marinol   - NJT placed for TF for pre-opetative nutritional management     Diet: Snacks/Supplements Adult: Other; whatever  patient prefers; Between Meals  Combination Diet Regular Diet Adult; 2 gm NA Diet  Adult Formula Drip Feeding: Continuous Osmolite 1.5; Nasojejunal; Goal Rate: 45; mL/hr; Medication - Feeding Tube Flush Frequency: At least 15-30 mL water before and after medication administration and with tube clogging; Amount to Send (Nutrition...  Calorie Counts    DVT Prophylaxis: Enoxaparin (Lovenox) SQ  Jurado Catheter: Not present  Fluids: PO  Central Lines: PRESENT  CVC TRIPLE LUMEN Right Internal jugular-Site Assessment: WDL  Code Status: Full Code      Disposition Plan   Expected discharge: >3 days recommended to TBD once transplant evaluation completed.     The patient's care was discussed with the attending physician, Dr. Franks.    Augustine Pena MD  [place] [id number] Service  [facility]  Securely message with the Vocera Web Console (learn more here)  Text page via [facility] Paging/Directory  Please see sign in/sign out for up to date coverage information    Clinically Significant Risk Factors Present on Admission                ______________________________________________________________________    Interval History   NAEO. Patient was given additional 25 g 5% albumin overnight given Cr. Patient reports some back pain this AM. Denies f/c. Continues to have good UOP.     4 point ROS otherwise negative    Data reviewed today: I reviewed all medications, new labs and imaging results over the last 24 hours.    Physical Exam   Vital Signs: Temp: (!) 96.2  F (35.7  C) Temp src: Temporal BP: 99/46 Pulse: 72   Resp: 18 SpO2: 100 % O2 Device: None (Room air)    Weight: 124 lbs 6.4 oz  General: awake, sitting up, appropriately tearful when discussing cardiac MRI and transplant  HEENT:NC/AT, scleral icterus present , NJ in place   Pulm/Resp: no increased work of breathing, symmetric chest expansion   CV: RRR, S1. S2 present, no murmur appreciated   Abdomen: Soft, mildly distended, non-tender, no  rebound tenderness or guarding, no masses. ROMA drain w/ serosanguinous fluids.   Incisions/Skin: no jaundice, abdominal incision c/d/i  MSK/Extremities: no edema, moving all extremities, calves soft, extremities well perfused    Data   Recent Labs   Lab 09/02/21  0359 09/02/21  0347 09/02/21  0010 09/01/21 2029 09/01/21  0553 08/31/21  0740   WBC 14.3*  --   --   --  16.0* 17.1*   HGB 8.1*  --   --   --  7.9* 9.0*   MCV 87  --   --   --  89 88     --   --   --  178 174   INR 1.48*  --   --   --  1.48* 1.39*     --   --   --  131* 129*   POTASSIUM 3.7  --   --   --  4.2 3.9   CHLORIDE 104  --   --   --  102 99   CO2 20  --   --   --  19* 17*   BUN 56*  --   --   --  50* 37*   CR 1.46*  --   --  1.45* 1.44* 1.23*   ANIONGAP 10  --   --   --  10 13   MARIELENA 9.5  --   --   --  9.4 9.1   GLC 91 94 93  --  104* 137*   ALBUMIN 3.9  --   --   --  3.4 3.2*   PROTTOTAL 6.2*  --   --   --  5.7* 6.0*   BILITOTAL 3.2*  --   --   --  3.1* 5.5*   ALKPHOS 206*  --   --   --  204* 268*   ALT 30  --   --   --  29 33   AST 32  --   --   --  30 34     No results found for this or any previous visit (from the past 24 hour(s)).  Medications     dextrose         albumin human  50 g Intravenous Daily     amiodarone  200 mg Oral Daily     cefTRIAXone  1 g Intravenous Q24H     enoxaparin ANTICOAGULANT  40 mg Subcutaneous Q24H     [Held by provider] furosemide  40 mg Oral Daily     heparin lock flush  5-20 mL Intracatheter Q24H     lactulose  20 g Oral BID     lidocaine (PF)  10 mL Subcutaneous Once     multivitamins w/minerals  15 mL Per Feeding Tube Daily     protein modular  1 packet Per Feeding Tube BID     sodium chloride (PF)  10-40 mL Intracatheter Q8H     sodium chloride (PF)  10-40 mL Intracatheter Q8H     [Held by provider] spironolactone  100 mg Oral Daily     ursodiol  300 mg Oral BID

## 2021-09-02 NOTE — PROGRESS NOTES
Nephrology Progress Note  09/02/2021         Assessment & Recommendations:   Berta Nava is a 56 year old woman with history notable for HTN, UC, and hepatic cirrhosis 2/2 PSC who was admitted 8/19 for potential liver transplant 8/20. The patient went into hypotensive VTACH during the surgery, requiring 5-6 defibrillations and multiple pressors. The patient has since recovered well, workup for VTACH has been unrevealing, and she was placed on the transplant list again as of 8/31. Nephrology was consulted for new GONZALO on 8/31.    # GONZALO, non oliguric  Cr up to 1.4 on 8/31 and was chronologically preceded by large volume ascites drainage.  This was also associated with period of relative hypotension.  Although the hypotension was relatively mild, this occurred in the setting of spironolactone, which may have augmented the effect on renal perfusion. Cr seems to have stabilized today following administration of albumin by primary team, and UOP is increasing (450 from 100 yesterday). No sign of obstruction on renal US. UA with hyaline casts c/w low flow state, but otherwise unremarkable. Would recommend continuing daily albumin and improving daily nutrition as you are with TF. Nephrology will continue to follow.  - Agree with albumin administration  - Agree with judicious future removal of ascitic fluid    Recommendations were communicated to primary team via phone    Seen and discussed with Dr. Edin Menjivar MD   [phone]    Interval History :   Nursing and provider notes from last 24 hours reviewed.  Berta feels better today. She had an NG placed yesterday and is tolerating her tube feeds so far. No SOB or chest pain. No abdominal pain. No fevers or chills.       Review of Systems:   4pt ROS negative except as above in HPI.    Physical Exam:   I/O last 3 completed shifts:  In: 2160 [P.O.:240; I.V.:130; NG/GT:120]  Out: 2250 [Urine:500; Drains:1750]   BP 99/46 (BP Location: Right arm)   Pulse 72   " Temp (!) 96.2  F (35.7  C) (Temporal)   Resp 18   Ht 1.651 m (5' 5\")   Wt 56.4 kg (124 lb 6.4 oz)   SpO2 100%   BMI 20.70 kg/m       GENERAL APPEARANCE: comfortable, sitting up in bed  EYES:  + scleral icterus  PULM: no increased WOB  CV: RRR     - trace edema  GI: soft, distended, non tender; ROMA drain in place  INTEGUMENT: jaundiced  NEURO:  Alert & fully oriented     Labs:   All labs reviewed by me  Electrolytes/Renal - Recent Labs   Lab Test 09/02/21 0359 09/02/21  0347 09/02/21  0010 09/01/21 2029 09/01/21  0553 08/31/21  0740     --   --   --  131* 129*   POTASSIUM 3.7  --   --   --  4.2 3.9   CHLORIDE 104  --   --   --  102 99   CO2 20  --   --   --  19* 17*   BUN 56*  --   --   --  50* 37*   CR 1.46*  --   --  1.45* 1.44* 1.23*   GLC 91 94 93  --  104* 137*   MARIELENA 9.5  --   --   --  9.4 9.1   MAG 2.5*  --   --   --  2.4* 2.4*   PHOS 3.6  --   --   --  3.5 3.3       CBC -   Recent Labs   Lab Test 09/02/21 0359 09/01/21  0553 08/31/21  0740   WBC 14.3* 16.0* 17.1*   HGB 8.1* 7.9* 9.0*    178 174       LFTs -   Recent Labs   Lab Test 09/02/21 0359 09/01/21  0553 08/31/21  0740   ALKPHOS 206* 204* 268*   BILITOTAL 3.2* 3.1* 5.5*   ALT 30 29 33   AST 32 30 34   PROTTOTAL 6.2* 5.7* 6.0*   ALBUMIN 3.9 3.4 3.2*       Iron Panel -   Recent Labs   Lab Test 08/26/21  1313   IMAN 161       Imaging:  All imaging studies reviewed by me.     Current Medications:    albumin human  50 g Intravenous Daily     amiodarone  200 mg Oral Daily     cefTRIAXone  1 g Intravenous Q24H     enoxaparin ANTICOAGULANT  40 mg Subcutaneous Q24H     [Held by provider] furosemide  40 mg Oral Daily     heparin lock flush  5-20 mL Intracatheter Q24H     lactulose  20 g Oral BID     lidocaine (PF)  10 mL Subcutaneous Once     multivitamins w/minerals  15 mL Per Feeding Tube Daily     protein modular  1 packet Per Feeding Tube BID     sodium chloride (PF)  10-40 mL Intracatheter Q8H     sodium chloride (PF)  10-40 mL " Intracatheter Q8H     [Held by provider] spironolactone  100 mg Oral Daily     ursodiol  300 mg Oral BID       dextrose       Jazmin Menjivar MD

## 2021-09-02 NOTE — PLAN OF CARE
[id number]: VSS. Denies having any pain, N/V or SOB. Tube feeing increased to 25ml/hr @ 0500, pt tolerating with no complaints. ROMA output @ 0030: 350ml; ROMA output @ 0430: 250ml. Continues on tele monitoring. Pt reports BM x1 (soft).

## 2021-09-02 NOTE — PLAN OF CARE
[id number]: Pt showered and felt much better afterwards. ROMA emptied Q 4 hrs and dressing changed as it was saturated. Scheduled Albumin infusing now over 4 hrs and 1 x Albumin (25%) given this am. TF rate increased to 35 ml/hr via NJ at 1330. Next increase to 45 ml/hr (goal) at 2130. Tolerating well, denies nausea. On calorie counts ate 75% of breakfast and declined lunch. Sleeping intermittently.     [id number]: Telemetry d/c'd. Has been in NSR. Orders changed re: emptying of ROMA: can empty 400 ml Q 6 hrs. Oxycodone and Pako Roque given for lower back pain. Tearful today that she has mostly been in bed today but states she feels like she needed the rest. Currently up in the wheelchair outside with her daughter. TF due to be increased at 2130.

## 2021-09-02 NOTE — PHARMACY-CONSULT NOTE
Pharmacy Tube Feeding Consult    Medication reviewed for administration by feeding tube and for potential food/drug interactions.    Recommendation: No changes are needed at this time. Do not crush spironolactone (order currently on hold).      Pharmacy will continue to follow as new medications are ordered.    Nini Gonzalez, PharmD

## 2021-09-02 NOTE — PLAN OF CARE
1500 - 2300 Vitals stable with soft pressures, afebrile, alert and oriented x4. Denies pain, nausea, shortness of breath. Abdominal incision closed with staples, open to air, WDL. ROMA site leaking but otherwise WDL, dressing changed x1. 750 mL of serous fluid emptied from ROMA over 8 hour shift. Good appetite. Tube feed initiated at 2100 at 15 mL/ hour through NJ. Due to have rate increased at 0500. Continue to monitor.

## 2021-09-02 NOTE — PROGRESS NOTES
"[facility]    Hepatology Follow-up    CC: Vtach    Dx: Hepatic cirrhosis 2/2 PSC                  Vtach                  Leukocytosis   Quiescent Ulcerative colitis     24 hour events:   NAEO    Subjective:  Ongoing leakage around the ROMA site.   No fevers, chills, abd pain, melena, hematochezia.     Medications  Current Facility-Administered Medications   Medication Dose Route Frequency    albumin human  50 g Intravenous Daily    amiodarone  200 mg Oral Daily    cefTRIAXone  1 g Intravenous Q24H    enoxaparin ANTICOAGULANT  40 mg Subcutaneous Q24H    [Held by provider] furosemide  40 mg Oral Daily    heparin lock flush  5-20 mL Intracatheter Q24H    lactulose  20 g Oral BID    lidocaine (PF)  10 mL Subcutaneous Once    multivitamins w/minerals  15 mL Per Feeding Tube Daily    protein modular  1 packet Per Feeding Tube BID    sodium chloride (PF)  10-40 mL Intracatheter Q8H    sodium chloride (PF)  10-40 mL Intracatheter Q8H    [Held by provider] spironolactone  100 mg Oral Daily    ursodiol  300 mg Oral BID       Review of systems  A 10-point review of systems was negative.    Examination  BP 99/46 (BP Location: Right arm)   Pulse 72   Temp (!) 96.2  F (35.7  C) (Temporal)   Resp 18   Ht 1.651 m (5' 5\")   Wt 56.4 kg (124 lb 6.4 oz)   SpO2 100%   BMI 20.70 kg/m      Constitutional: cooperative, pleasant  Eyes: Sclera icteric  Ears/nose/mouth/throat: mucus membranes moist  Neck: supple  CV: No edema  Respiratory: Unlabored breathing   Abd: Nondistended, +bs, mild b/l LQ tenderness, surgical incisions in place, ROMA in place (orange serosanguinous output)  Skin: warm, perfused, jaundiced  Neuro: AAO x 3, no asterixis      Na 131, TB 3.1, 1.44  MELD-Na 23    Radiology  CT Chest 8/19/21:  IMPRESSION:   1. No evidence of pulmonary embolism.  Contrast mixing artifact in the  left lower lobe superior segment pulmonary artery. No evidence of  right heart strain.  2. Endotracheal tube abutting " the caryn.  3. Atelectasis/collapse of the posterior aspects of the bilateral  upper and lower lobes.   4. Tree in bud nodularity involving the left upper, left lower, and  the left middle ribs. Possibilities include infectious and  inflammatory etiologies. Recommend follow-up until resolution.  5. 5 mm pulmonary nodule of the right upper lobe, if the patient is  considered high risk for lung cancer consider follow-up low-dose chest  CT in 12 months.    US RUQ 8/23/21:  Impression:   1.  Cirrhotic liver with retrograde flow in the left portal vein  suggestive of portal hypertension. No evidence for portal venous  thrombosis.  2.  Mild ascites.      ERCP 8/30/21:  Impression:          - Three partially occluded stents from the biliary tree                        were seen in the major papilla. Extracted.                        - Prior biliary sphincterotomy appeared open.                        - Filling defects consistent with stones and sludge was                        seen on the cholangiogram mainly in the right lobe and                        common duct. The left intrahepatics looked much improved                        compared to prior.                        - The right main/anterior hepatic duct stricture which                        had upstream filling defects was successfully dilated to                        6 mm.                        - Biliary tree was swept several times clearing multiple                        small stones and sludge. Saccular dilation off of right                        intrahepatic branch with stones only partially cleared                        therefore a stent was replaced here.                        - One 10 Fr x 17 cm transpapillary Johlin plastic stent                        was placed into the right main hepatic duct.   Recommendation:      Overall impression is 56 year-old female with PSC                        cirrhosis who needed repeat ERCPs for further stone                         clearance and stents exchange as a bridge to liver                        transplant. ERCP today showed clearance of L                        intrahepatic ducts and a mildly stenosed right anterior                        duct with upstream saccular dilation and stones.                        Clearance of intra and extrahepatic stones and sludges                        but some remain in the right intrahepatics. A 10 Fr                        stent was placed into the right anterior duct to ensure                        drainage as a bridge to transplant.                        - Return patient to hospital strong for ongoing care.                        - Repeat ERCP with Dr. Gabriel in 4 weeks assuming patient                        has not been transplanted by then. Earlier if signs of                        recurrent cholangitis or rising LFTs.                        - The findings and recommendations were discussed with                        the patient and their family.      Assessment  55 year old with past medical history of hypertension, microscopic otitis, ulcerative colitis, pancreatic mucinous cystadenoma, PSC with cirrhosis who presented for liver transplantation but went into V. tach and surgery was aborted.  Patient was requiring pressor support and was transferred to ICU, now recovering on the floor.    PSC cirrhosis   Patient was scheduled for ERCP the day of her aborted transplant surgery.  She has been requiring ERCP every 4 weeks with stent placement as bridge to liver transplantation.  Patient underwent ERCP with removal of stone and sludge, right main hepatic duct was dilated and restented.  -- Monitor LFTs and INR daily  -- Lactulose PO, or Miralax  -- Continue Ursodiol  -- Ensure sodium restriction to 2000 mg per day  -- Adequate protein diet (1.2 - 1.5g/kg/day)   - Recommend multiple meals during the day, Snacks and shakes during the day/night   - Can use Ensure/Boost drinks  TID  --PT/OT  --Repeat ERCP in 4 weeks if not transplanted before    Intra-op VT   Pt had cardiology evaluation with recommendation of  cardiac cath which was negative for any obstructive coronary arterial disease. Transplant surgery has been involved and wants to rule out cardiac siderosis as possible cause of her V. Tach.  Her ferritin was not elevated, which points against siderosis.  Cardiac MRI unremarkable     Intra-abdominal ROMA drain  Patient has been requiring weekly paracentesis, now she has ROMA drain after her reported transplant surgery, which likely will continue to have increase output secondary to her history of cirrhosis and ascites. With the drain, she is at risk of developing SBP. She may require paracentesis every day to avoid fluid leakage if plans to remove ROMA drain by transplant surgery.  -- Continue to hold diuretics, but will reevaluate daily  -- Monitor intake and output  -- ROMA drain with limit on maximal output 2L /day to avoid GONZALO  -- Continue albumin for GONZALO, would also give 7.5 grams albumin/L removed daily    Leukocytosis  Patient has slight increase in her leukocyte count, no fevers, with her ROMA drain she is at high risk for SBP, fluid analysis yesterday was -ve.  Leukocytosis can be due to PSC and retained stone/ sludge.   --Low threshold for antibiotics    Thank you for involving us in this patient's care. Please do not hesitate to contact the GI service with any questions or concerns.      Pt care plan discussed with Dr. Mao, Hepatology staff physician.    Allyssa Coreas MD  Gastroenterology Fellow  Pager [phone]

## 2021-09-03 LAB
ABO/RH(D): NORMAL
ALBUMIN SERPL-MCNC: 3.9 G/DL (ref 3.4–5)
ALP SERPL-CCNC: 152 U/L (ref 40–150)
ALT SERPL W P-5'-P-CCNC: 26 U/L (ref 0–50)
ANION GAP SERPL CALCULATED.3IONS-SCNC: 12 MMOL/L (ref 3–14)
ANTIBODY SCREEN: NEGATIVE
AST SERPL W P-5'-P-CCNC: 32 U/L (ref 0–45)
BACTERIA FLD CULT: ABNORMAL
BILIRUB DIRECT SERPL-MCNC: 2.6 MG/DL (ref 0–0.2)
BILIRUB SERPL-MCNC: 3.5 MG/DL (ref 0.2–1.3)
BLD PROD TYP BPU: NORMAL
BLOOD COMPONENT TYPE: NORMAL
BUN SERPL-MCNC: 81 MG/DL (ref 7–30)
CALCIUM SERPL-MCNC: 9.4 MG/DL (ref 8.5–10.1)
CHLORIDE BLD-SCNC: 102 MMOL/L (ref 94–109)
CO2 SERPL-SCNC: 19 MMOL/L (ref 20–32)
CODING SYSTEM: NORMAL
CREAT SERPL-MCNC: 1.2 MG/DL (ref 0.52–1.04)
CROSSMATCH: NORMAL
ERYTHROCYTE [DISTWIDTH] IN BLOOD BY AUTOMATED COUNT: 18.2 % (ref 10–15)
GFR SERPL CREATININE-BSD FRML MDRD: 50 ML/MIN/1.73M2
GLUCOSE BLD-MCNC: 108 MG/DL (ref 70–99)
HCT VFR BLD AUTO: 18.4 % (ref 35–47)
HCT VFR BLD AUTO: 23.2 % (ref 35–47)
HGB BLD-MCNC: 6.3 G/DL (ref 11.7–15.7)
HGB BLD-MCNC: 7.8 G/DL (ref 11.7–15.7)
INR PPP: 1.55 (ref 0.85–1.15)
ISSUE DATE AND TIME: NORMAL
MAGNESIUM SERPL-MCNC: 2.3 MG/DL (ref 1.6–2.3)
MCH RBC QN AUTO: 30 PG (ref 26.5–33)
MCHC RBC AUTO-ENTMCNC: 34.2 G/DL (ref 31.5–36.5)
MCV RBC AUTO: 88 FL (ref 78–100)
PHOSPHATE SERPL-MCNC: 3.6 MG/DL (ref 2.5–4.5)
PLATELET # BLD AUTO: 151 10E3/UL (ref 150–450)
POTASSIUM BLD-SCNC: 4 MMOL/L (ref 3.4–5.3)
PROT SERPL-MCNC: 6 G/DL (ref 6.8–8.8)
RBC # BLD AUTO: 2.1 10E6/UL (ref 3.8–5.2)
SARS-COV-2 RNA RESP QL NAA+PROBE: NEGATIVE
SODIUM SERPL-SCNC: 133 MMOL/L (ref 133–144)
SPECIMEN EXPIRATION DATE: NORMAL
UNIT ABO/RH: NORMAL
UNIT NUMBER: NORMAL
UNIT STATUS: NORMAL
UNIT TYPE ISBT: 8400
WBC # BLD AUTO: 13.5 10E3/UL (ref 4–11)

## 2021-09-03 PROCEDURE — 250N000013 HC RX MED GY IP 250 OP 250 PS 637: Performed by: INTERNAL MEDICINE

## 2021-09-03 PROCEDURE — 85018 HEMOGLOBIN: CPT | Performed by: STUDENT IN AN ORGANIZED HEALTH CARE EDUCATION/TRAINING PROGRAM

## 2021-09-03 PROCEDURE — 120N000002 HC R&B MED SURG/OB UMMC

## 2021-09-03 PROCEDURE — 85027 COMPLETE CBC AUTOMATED: CPT | Performed by: STUDENT IN AN ORGANIZED HEALTH CARE EDUCATION/TRAINING PROGRAM

## 2021-09-03 PROCEDURE — 250N000011 HC RX IP 250 OP 636: Performed by: STUDENT IN AN ORGANIZED HEALTH CARE EDUCATION/TRAINING PROGRAM

## 2021-09-03 PROCEDURE — P9041 ALBUMIN (HUMAN),5%, 50ML: HCPCS | Performed by: STUDENT IN AN ORGANIZED HEALTH CARE EDUCATION/TRAINING PROGRAM

## 2021-09-03 PROCEDURE — P9016 RBC LEUKOCYTES REDUCED: HCPCS | Performed by: STUDENT IN AN ORGANIZED HEALTH CARE EDUCATION/TRAINING PROGRAM

## 2021-09-03 PROCEDURE — 36592 COLLECT BLOOD FROM PICC: CPT | Performed by: INTERNAL MEDICINE

## 2021-09-03 PROCEDURE — 80048 BASIC METABOLIC PNL TOTAL CA: CPT | Performed by: STUDENT IN AN ORGANIZED HEALTH CARE EDUCATION/TRAINING PROGRAM

## 2021-09-03 PROCEDURE — 86901 BLOOD TYPING SEROLOGIC RH(D): CPT | Performed by: INTERNAL MEDICINE

## 2021-09-03 PROCEDURE — 85610 PROTHROMBIN TIME: CPT | Performed by: STUDENT IN AN ORGANIZED HEALTH CARE EDUCATION/TRAINING PROGRAM

## 2021-09-03 PROCEDURE — 250N000011 HC RX IP 250 OP 636: Performed by: NURSE PRACTITIONER

## 2021-09-03 PROCEDURE — 250N000013 HC RX MED GY IP 250 OP 250 PS 637: Performed by: NURSE PRACTITIONER

## 2021-09-03 PROCEDURE — 250N000013 HC RX MED GY IP 250 OP 250 PS 637: Performed by: STUDENT IN AN ORGANIZED HEALTH CARE EDUCATION/TRAINING PROGRAM

## 2021-09-03 PROCEDURE — 83735 ASSAY OF MAGNESIUM: CPT | Performed by: STUDENT IN AN ORGANIZED HEALTH CARE EDUCATION/TRAINING PROGRAM

## 2021-09-03 PROCEDURE — 82248 BILIRUBIN DIRECT: CPT | Performed by: STUDENT IN AN ORGANIZED HEALTH CARE EDUCATION/TRAINING PROGRAM

## 2021-09-03 PROCEDURE — 99232 SBSQ HOSP IP/OBS MODERATE 35: CPT | Mod: GC | Performed by: INTERNAL MEDICINE

## 2021-09-03 PROCEDURE — 36592 COLLECT BLOOD FROM PICC: CPT | Performed by: STUDENT IN AN ORGANIZED HEALTH CARE EDUCATION/TRAINING PROGRAM

## 2021-09-03 PROCEDURE — 86923 COMPATIBILITY TEST ELECTRIC: CPT | Performed by: STUDENT IN AN ORGANIZED HEALTH CARE EDUCATION/TRAINING PROGRAM

## 2021-09-03 PROCEDURE — 84100 ASSAY OF PHOSPHORUS: CPT | Performed by: STUDENT IN AN ORGANIZED HEALTH CARE EDUCATION/TRAINING PROGRAM

## 2021-09-03 PROCEDURE — 99233 SBSQ HOSP IP/OBS HIGH 50: CPT | Mod: GC | Performed by: INTERNAL MEDICINE

## 2021-09-03 RX ADMIN — Medication: at 04:14

## 2021-09-03 RX ADMIN — URSODIOL 300 MG: 300 CAPSULE ORAL at 09:33

## 2021-09-03 RX ADMIN — Medication 1 PACKET: at 21:56

## 2021-09-03 RX ADMIN — OXYCODONE HYDROCHLORIDE 5 MG: 5 TABLET ORAL at 22:07

## 2021-09-03 RX ADMIN — LACTULOSE 20 G: 10 POWDER, FOR SOLUTION ORAL at 09:33

## 2021-09-03 RX ADMIN — LACTULOSE 20 G: 10 POWDER, FOR SOLUTION ORAL at 21:54

## 2021-09-03 RX ADMIN — Medication 5 ML: at 06:38

## 2021-09-03 RX ADMIN — AMIODARONE HYDROCHLORIDE 200 MG: 200 TABLET ORAL at 09:33

## 2021-09-03 RX ADMIN — ENOXAPARIN SODIUM 40 MG: 100 INJECTION SUBCUTANEOUS at 11:05

## 2021-09-03 RX ADMIN — Medication 1 PACKET: at 09:36

## 2021-09-03 RX ADMIN — ALBUMIN HUMAN 50 G: 0.05 INJECTION, SOLUTION INTRAVENOUS at 15:07

## 2021-09-03 RX ADMIN — CEFTRIAXONE 1 G: 1 INJECTION, POWDER, FOR SOLUTION INTRAMUSCULAR; INTRAVENOUS at 21:46

## 2021-09-03 RX ADMIN — URSODIOL 300 MG: 300 CAPSULE ORAL at 21:55

## 2021-09-03 RX ADMIN — Medication 15 ML: at 09:35

## 2021-09-03 ASSESSMENT — ACTIVITIES OF DAILY LIVING (ADL)
ADLS_ACUITY_SCORE: 16

## 2021-09-03 NOTE — PROGRESS NOTES
"[facility]    Hepatology Follow-up    CC: VTach arrest intraoperatively during liver transplant    Dx: PSC Cirrhosis   VTach arrest   Quiescent Ulcerative Colitis   GONZALO, improving     24 hour events:   NAEO.     Subjective:  Overall feeling well. In good spirits despite NJ tube in place. Denies abdominal pain, nausea, vomiting, diarrhea. Patient denies fevers, sweats or chills.    Medications  Current Facility-Administered Medications   Medication Dose Route Frequency     albumin human  50 g Intravenous Daily     amiodarone  200 mg Oral Daily     cefTRIAXone  1 g Intravenous Q24H     enoxaparin ANTICOAGULANT  40 mg Subcutaneous Q24H     [Held by provider] furosemide  40 mg Oral Daily     heparin lock flush  5-20 mL Intracatheter Q24H     lactulose  20 g Oral BID     multivitamins w/minerals  15 mL Per Feeding Tube Daily     protein modular  1 packet Per Feeding Tube BID     sodium chloride (PF)  10-40 mL Intracatheter Q8H     [Held by provider] spironolactone  100 mg Oral Daily     ursodiol  300 mg Oral BID       Review of systems  A 10-point review of systems was negative, unless stated above    Examination  /55   Pulse 79   Temp (!) 96.6  F (35.9  C) (Oral)   Resp 18   Ht 1.651 m (5' 5\")   Wt 56.4 kg (124 lb 6.4 oz)   SpO2 100%   BMI 20.70 kg/m      Intake/Output Summary (Last 24 hours) at 9/3/2021 0807  Last data filed at 9/3/2021 0713  Gross per 24 hour   Intake 2645 ml   Output 2250 ml   Net 395 ml       General: Looks well, NAD, normal color  CVS: RRR   Resp: CTA  Abdomen: Soft, NTND, ROMA drain in place, scant blood on dressing at top of abdominal incision site.    Extremities:  Neuro: AAO X 3, no asterixis   Skin: Ecchymoses under R upper arm. no rash  Psych: normal mood      Laboratory  Lab Results   Component Value Date     09/02/2021     06/25/2021    POTASSIUM 3.7 09/02/2021    POTASSIUM 4.4 06/25/2021    CHLORIDE 104 09/02/2021    CHLORIDE 100 " 06/25/2021    CO2 20 09/02/2021    CO2 21 06/25/2021    BUN 56 09/02/2021    BUN 23 06/25/2021    CR 1.21 09/02/2021    CR 0.69 06/25/2021       Lab Results   Component Value Date    BILITOTAL 3.2 09/02/2021    BILITOTAL 3.3 06/25/2021    ALT 30 09/02/2021    ALT 32 06/25/2021    AST 32 09/02/2021    AST 58 06/25/2021    ALKPHOS 206 09/02/2021    ALKPHOS 386 06/25/2021       Lab Results   Component Value Date    WBC 14.3 09/02/2021    WBC 15.2 06/25/2021    HGB 8.1 09/02/2021    HGB 10.7 06/25/2021    MCV 87 09/02/2021    MCV 92 06/25/2021     09/02/2021     06/25/2021       Lab Results   Component Value Date    INR 1.48 09/02/2021    INR 1.39 06/25/2021       MELD-Na score: 21 at 9/3/2021  6:42 AM  MELD score: 18 at 9/3/2021  6:42 AM  Calculated from:  Serum Creatinine: 1.20 mg/dL at 9/3/2021  6:42 AM  Serum Sodium: 133 mmol/L at 9/3/2021  6:42 AM  Total Bilirubin: 3.5 mg/dL at 9/3/2021  6:42 AM  INR(ratio): 1.55 at 9/3/2021  6:42 AM  Age: 56 years       Assessment  56F w/ history of HTN, quiescent ulcerative colitis, pancreatic mucinous cystadenoma, PSC cirrhosis who was admitted on 8/18/21for liver transplantation surgery. Intraoperatively experienced VTach arrest and surgery was aborted.  Post-operatively, pt initially required pressors, but is now clinically improved.  Followup up coronary cath and cardiac MRI unremarkable.  Cardiac siderosis less likely given non-elevated ferritin. She remains on empiric antibiotics for leukocytosis of unexplained etiology. Her GONZALO is improving with albumin. She is now re-activated on liver transplant list.     #. Cirrhosis 2/2 PSC (Active on Transplant list)  #. Intra-operative VT (during attempt at liver transplantation surgery on 8/18/21)  She has been requiring ERCP every 4 weeks with stent placement as bridge to liver transplantation.  Patient underwent ERCP with removal of stone and sludge, right main hepatic duct was dilated and restented. Last ERCP  8/30/21   -- Monitor LFTs and INR daily  -- Lactulose PO, or Miralax  -- Continue Ursodiol  -- Ensure sodium restriction to 2000 mg per day  -- Adequate protein diet (1.2 - 1.5g/kg/day)                 - Recommend multiple meals during the day, Snacks and shakes during the day/night                 - Can use Ensure/Boost drinks TID  --PT/OT  --Repeat ERCP in 4 weeks if not transplanted before     #. GONZALO, improving   #. Acute on chronic anemia, likely dilutional (albumin)    #. Intra-abdominal ROMA drain  Patient has been requiring weekly paracentesis, now she has ROMA drain after her reported transplant surgery, which likely will continue to have increase output secondary to her history of cirrhosis and ascites. With the drain, she is at risk of developing SBP. She might require up to nearly daily paracenteses if ROMA drain is removed.   -- Continue to hold diuretics in setting of GONZALO, but will reevaluate daily  -- Monitor intake and output  -- ROMA drain with limit on maximal output 2L /day to avoid GONZALO  -- Continue albumin for GONZALO, would also give 7.5 grams albumin/L removed daily     Patient staffed with my attending physician, Dr. Leventhal Susan Lou, MD  Gastroenterology Fellow  Pager [phone]

## 2021-09-03 NOTE — PROGRESS NOTES
Calorie Count  Intake recorded for: 9/2  Total Kcals: 490 Total Protein: 24g  Kcals from Hospital Food: 490  Protein: 24g  Kcals from Outside Food (average):0 Protein: 0g  # Meals Ordered from Kitchen: 2 meals   # Meals Recorded: 1 meal (First - 100% 2 servings of cheerios, coffee)  # Supplements Recorded: 100% 1 Ensure Enlive

## 2021-09-03 NOTE — PLAN OF CARE
Time: 4695-5977    Afebrile with VSS on RA. A/Ox4. Jaundiced. Adequate icteric urine. Staples intact. ROMA drained at 2300 with 250ccs yellow/serous output. Drain ascites fluid came back positive for staphylococcus epidermis. Small BM this shift. Up ad indy. Lactic acid level of 1.5. TF at goal rate of 45mL/hr.       Time: 2374-0977    Afebrile with VSS on RA. A/Ox4. C/o of lower back pain x1, bengay cream applied with relief. Adequate icteric UO and small BM this shift. Upper staples of abdominal incision had some bleeding when self-repositioning in bed. Gauze applied and pt would like to change linens in AM. ROMA drained at 0500, 250ccs of yellow/serous output. New TF bag spiked with new tubing this shift. COVID test resulted negative.

## 2021-09-03 NOTE — PROVIDER NOTIFICATION
Provider notification;    Doctor Jean notified at 0815 Via phone conversation.    Reason for notification critical value hemoglobin 6.3    Plan provider to assess and continue to monitor. 1 unit RBC given at 1030 with hemoglobin recheck due at 1315.

## 2021-09-03 NOTE — PROVIDER NOTIFICATION
Provider notification;    Doctor Jean notified at 1310 Via phone call.    Reason for notification pt had large, dark bowel movement this afternoon along with low hemoglobin.    Plan is to monitor hemoglobin recheck scheduled for 1315 this afternoon.

## 2021-09-03 NOTE — PROGRESS NOTES
[facility]    Progress Note - Cassie 4 Service        Date of Admission:  8/18/2021    Assessment & Plan        Berta AYE Nava is a 55 year old female with 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 She presented in stable condition from home for potential liver transplant. Patient was taken to the OR. Intraop, abdomen was opened, ascites was evacuated and falciform and left triangular ligament divided. At this time, patient developed vtach requiring 5-6 rounds of debrillation and the procedure was aborted. Consideration for starting ECMO, but patient converted out. Patient incisions closed. Taken to CT for concern for PE. CT showed no signs of PE. Initially admitted to SICU due to unstable hemodynamics on 3 pressors. Transferred to MICU 8/22 after coming of pressors. Transferred to floor 8/23.    Today:  - Continue daily albumin at 50 g of 5%.   - Hb dropped, likely dilutional, will give 1 U RBCs  - Actively listed for liver transplant     # Hepatic Cirrhosis Secondary to PSC/UC  Patient reevaluated for liver transplantation due to arrhythmias. Now relisted for transplant  - ROMA drain x1, intermittent draining at 400 mL q6h   - Hepatology consulted, appreciate recs  - Hold home diuretics given ROMA output  - Continue PTA ursodiol   - Lactulose 20 BID ordered, PRN senna and miralax  - ERCP 8/30 with removal of sludge and stone as well as dilation and stenting. Will need to be preformed q4 weeks until transplanted  - NJT placed for TF for pre-opetative nutritional management    #. GONZALO (BL Creat 0.5-0.7)  Significant fluid output from ROMA with net negative fluid status. Likely prerenal GONZALO secondary to hypovolemia. Also consider postrenal related to compression from ascites.   - Nephrology consulted  - ROMA drain plan as above  - UA, urine Na, and renal US ordered  - Continue daily albumin at  50 g of 5%.     #. Intraoperative V tach requiring 4 rounds of defibrillation   #. Distributive vs hypovolemic shock, resolved   Patient had wnl stress ECHO pre-op with EF 60-65%. Intraoperative v tach requiring defibrillation, came to SICU on 3 pressors. TTE post op 8/19 with global and regional left ventricular function is normal with an EF of 60-65%. Off pressors since 8/20.  - MAP goal > 55  - Amiodarone PO taper per EP  - Cards consulted, appreciate recs  - Cronary angiogram 8/25, without disease  - Cardiac MRI without remarkable findings     #. SIRS  Unclear if any infectious source. Initially thought to possibly be cholangitis but less likely per discussion with transplant hepatology. Greatest concern for an intraabdominal source. OR, blood, and sputum cultures with NGTD. EBV IgG positive but IgM not reactive. At this point low suspicion for infection.   - ID consulted  - CTM Cx NGTD, 8/30 Cx with GPC isolated only in broth (indicating likely contaminant)   - Continue CTX 1 g every day per hepatology    #. Post Operative Abdominal Pain  - Oxycodone 5 mg q4 PRN    #. Low Back Pain  -PRN Pako Roque    #. Acute blood loss anemia    S/p transfusion: 3 units intraoperative. Noted to have Hb drop to 6.3 9/3 AM. No evidence of acute bleed. Given changes across cell lines, greatest concern for hemodilution.    - Goals: Hb > 8, Fibrinogen >200, INR < 2, plts > 50  - Continue to trend Hb   - Will give 1 U pRBCs, recheck Hb    #. Post operative respiratory failure, resolved   - Extubated, on RA    #. Stress hyperglycemia, resolved  In setting of steroid administration, resolved, sliding scale insulin discontinued    #. Deconditioning  - PT and OT consulted    #. Severe Malnutrition in the Context of Acute Illness  -Calorie counts ordered  -PRN ensure ordered  -Will consider Marinol   - NJT placed for TF for pre-opetative nutritional management     Diet: Snacks/Supplements Adult: Other; whatever patient prefers; Between  Meals  Combination Diet Regular Diet Adult; 2 gm NA Diet  Adult Formula Drip Feeding: Continuous Osmolite 1.5; Nasojejunal; Goal Rate: 45; mL/hr; Medication - Feeding Tube Flush Frequency: At least 15-30 mL water before and after medication administration and with tube clogging; Amount to Send (Nutrition...  Calorie Counts    DVT Prophylaxis: Enoxaparin (Lovenox) SQ  Jurado Catheter: Not present  Fluids: PO  Central Lines: PRESENT  CVC TRIPLE LUMEN Right Internal jugular-Site Assessment: WDL  Code Status: Full Code      Disposition Plan   Expected discharge: >3 days recommended to TBD once transplant evaluation completed.     The patient's care was discussed with the attending physician, Dr. Franks.    Augustine Pena MD  [place] [id number] Service  [facility]  Securely message with the Vocera Web Console (learn more here)  Text page via Mobile Automation Paging/Directory  Please see sign in/sign out for up to date coverage information    Clinically Significant Risk Factors Present on Admission                ______________________________________________________________________    Interval History   NAEO. Patient noted to have Hb of 6.3 this AM. Denies any active bleeding symptoms or bloody BMs. Does report minimal blood from incision.    4 point ROS otherwise negative    Data reviewed today: I reviewed all medications, new labs and imaging results over the last 24 hours.    Physical Exam   Vital Signs: Temp: (!) 96.5  F (35.8  C) Temp src: Oral BP: 102/52 Pulse: 82   Resp: 17 SpO2: 100 % O2 Device: None (Room air)    Weight: 124 lbs 6.4 oz  General: awake, sitting up, appropriately tearful when discussing cardiac MRI and transplant  HEENT:NC/AT, scleral icterus present , NJ in place   Pulm/Resp: no increased work of breathing, symmetric chest expansion   CV: RRR, S1. S2 present, no murmur appreciated   Abdomen: Soft, mildly distended, non-tender, no rebound tenderness or guarding, no masses.  ROMA drain w/ serosanguinous fluids. Incision with minimal blood crusting and some spotted blood on overlying gauze    Incisions/Skin: no jaundice, abdominal incision c/d/i  MSK/Extremities: no edema, moving all extremities, calves soft, extremities well perfused    Data   Recent Labs   Lab 09/03/21  0642 09/02/21 2021 09/02/21  0359 09/02/21  0347 09/01/21 2029 09/01/21  0553 09/01/21  0553   WBC 13.5*  --  14.3*  --   --   --  16.0*   HGB 6.3*  --  8.1*  --   --   --  7.9*   MCV 88  --  87  --   --   --  89     --  200  --   --   --  178   INR 1.55*  --  1.48*  --   --   --  1.48*     --  134  --   --   --  131*   POTASSIUM 4.0  --  3.7  --   --   --  4.2   CHLORIDE 102  --  104  --   --   --  102   CO2 19*  --  20  --   --   --  19*   BUN 81*  --  56*  --   --   --  50*   CR 1.20* 1.21* 1.46*  --   --    < > 1.44*   ANIONGAP 12  --  10  --   --   --  10   MARIELENA 9.4  --  9.5  --   --   --  9.4   *  --  91 94   < >  --  104*   ALBUMIN 3.9  --  3.9  --   --   --  3.4   PROTTOTAL 6.0*  --  6.2*  --   --   --  5.7*   BILITOTAL 3.5*  --  3.2*  --   --   --  3.1*   ALKPHOS 152*  --  206*  --   --   --  204*   ALT 26  --  30  --   --   --  29   AST 32  --  32  --   --   --  30    < > = values in this interval not displayed.     No results found for this or any previous visit (from the past 24 hour(s)).  Medications     dextrose         albumin human  50 g Intravenous Daily     amiodarone  200 mg Oral Daily     cefTRIAXone  1 g Intravenous Q24H     enoxaparin ANTICOAGULANT  40 mg Subcutaneous Q24H     [Held by provider] furosemide  40 mg Oral Daily     heparin lock flush  5-20 mL Intracatheter Q24H     lactulose  20 g Oral BID     multivitamins w/minerals  15 mL Per Feeding Tube Daily     protein modular  1 packet Per Feeding Tube BID     sodium chloride (PF)  10-40 mL Intracatheter Q8H     [Held by provider] spironolactone  100 mg Oral Daily     ursodiol  300 mg Oral BID

## 2021-09-03 NOTE — PROGRESS NOTES
"[facility]    Hepatology Follow-up    CC: Vtach    Dx: Hepatic cirrhosis 2/2 PSC                  Vtach                  Leukocytosis   Quiescent Ulcerative colitis   GONZALO     24 hour events:   NAEO    Subjective:  Showered. Felt much better. Ongoing leakage around the ROMA site.   Has new NJ tube. Tolerating. Staying positive. Ambulating.     Medications  Current Facility-Administered Medications   Medication Dose Route Frequency     albumin human  50 g Intravenous Daily     amiodarone  200 mg Oral Daily     cefTRIAXone  1 g Intravenous Q24H     enoxaparin ANTICOAGULANT  40 mg Subcutaneous Q24H     [Held by provider] furosemide  40 mg Oral Daily     heparin lock flush  5-20 mL Intracatheter Q24H     lactulose  20 g Oral BID     multivitamins w/minerals  15 mL Per Feeding Tube Daily     protein modular  1 packet Per Feeding Tube BID     sodium chloride (PF)  10-40 mL Intracatheter Q8H     [Held by provider] spironolactone  100 mg Oral Daily     ursodiol  300 mg Oral BID       Review of systems  A 10-point review of systems was negative.    Examination  /54 (BP Location: Right arm)   Pulse 74   Temp 97  F (36.1  C) (Oral)   Resp 18   Ht 1.651 m (5' 5\")   Wt 56.4 kg (124 lb 6.4 oz)   SpO2 96%   BMI 20.70 kg/m      Constitutional: cooperative, pleasant, standing at side of  Bed after shower  Eyes: Sclera icteric  Ears/nose/mouth/throat: mucus membranes moist  Neck: supple  CV: No edema  Respiratory: Unlabored breathing   Abd: Soft, non-distended, non-tender, ROMA in place (serosang output).  Skin: warm, perfused, jaundiced  Neuro: AAO x 3, no asterixis    MELD-Na 21    Radiology  CT Chest 8/19/21:  IMPRESSION:   1. No evidence of pulmonary embolism.  Contrast mixing artifact in the  left lower lobe superior segment pulmonary artery. No evidence of  right heart strain.  2. Endotracheal tube abutting the caryn.  3. Atelectasis/collapse of the posterior aspects of the bilateral  upper " and lower lobes.   4. Tree in bud nodularity involving the left upper, left lower, and  the left middle ribs. Possibilities include infectious and  inflammatory etiologies. Recommend follow-up until resolution.  5. 5 mm pulmonary nodule of the right upper lobe, if the patient is  considered high risk for lung cancer consider follow-up low-dose chest  CT in 12 months.    US RUQ 8/23/21:  Impression:   1.  Cirrhotic liver with retrograde flow in the left portal vein  suggestive of portal hypertension. No evidence for portal venous  thrombosis.  2.  Mild ascites.      ERCP 8/30/21:  Impression:          - Three partially occluded stents from the biliary tree                        were seen in the major papilla. Extracted.                        - Prior biliary sphincterotomy appeared open.                        - Filling defects consistent with stones and sludge was                        seen on the cholangiogram mainly in the right lobe and                        common duct. The left intrahepatics looked much improved                        compared to prior.                        - The right main/anterior hepatic duct stricture which                        had upstream filling defects was successfully dilated to                        6 mm.                        - Biliary tree was swept several times clearing multiple                        small stones and sludge. Saccular dilation off of right                        intrahepatic branch with stones only partially cleared                        therefore a stent was replaced here.                        - One 10 Fr x 17 cm transpapillary Johlin plastic stent                        was placed into the right main hepatic duct.   Recommendation:      Overall impression is 56 year-old female with PSC                        cirrhosis who needed repeat ERCPs for further stone                        clearance and stents exchange as a bridge to liver                         transplant. ERCP today showed clearance of L                        intrahepatic ducts and a mildly stenosed right anterior                        duct with upstream saccular dilation and stones.                        Clearance of intra and extrahepatic stones and sludges                        but some remain in the right intrahepatics. A 10 Fr                        stent was placed into the right anterior duct to ensure                        drainage as a bridge to transplant.                        - Return patient to hospital strong for ongoing care.                        - Repeat ERCP with Dr. Gabriel in 4 weeks assuming patient                        has not been transplanted by then. Earlier if signs of                        recurrent cholangitis or rising LFTs.                        - The findings and recommendations were discussed with                        the patient and their family.      Assessment  55 year old with past medical history of hypertension, microscopic otitis, ulcerative colitis, pancreatic mucinous cystadenoma, PSC with cirrhosis who presented for liver transplantation but went into V. tach and surgery was aborted.  Initially required pressor support in ICU, now clinically improving.     PSC cirrhosis   Active - Transplant List  Patient was scheduled for ERCP the day of her aborted transplant surgery.  She has been requiring ERCP every 4 weeks with stent placement as bridge to liver transplantation.  Patient underwent ERCP with removal of stone and sludge, right main hepatic duct was dilated and restented.  -- Monitor LFTs and INR daily  -- Lactulose PO, or Miralax  -- Continue Ursodiol  -- Ensure sodium restriction to 2000 mg per day  -- Adequate protein diet (1.2 - 1.5g/kg/day)   - Recommend multiple meals during the day, Snacks and shakes during the day/night   - Can use Ensure/Boost drinks TID  --PT/OT  --Repeat ERCP in 4 weeks if not transplanted before    Intra-op VT   Pt had  cardiology evaluation with recommendation of  cardiac cath which was negative for any obstructive coronary arterial disease. Transplant surgery has been involved and wants to rule out cardiac siderosis as possible cause of her V. Tach.  Her ferritin was not elevated, which points against siderosis.  Cardiac MRI unremarkable     GONZALO  Likely pre-renal in setting of ongoing volume loss through drain  Improving with albumin     Intra-abdominal ROMA drain  Patient has been requiring weekly paracentesis, now she has ROMA drain after her reported transplant surgery, which likely will continue to have increase output secondary to her history of cirrhosis and ascites. With the drain, she is at risk of developing SBP. She may require paracentesis every day to avoid fluid leakage if plans to remove ROMA drain by transplant surgery.  -- Continue to hold diuretics, but will reevaluate daily  -- Monitor intake and output  -- ROMA drain with limit on maximal output 2L /day to avoid GONZALO  -- Continue albumin for GONZALO, would also give 7.5 grams albumin/L removed daily     Leukocytosis  Patient has slight increase in her leukocyte count, no fevers, with her ROMA drain she is at high risk for SBP, fluid analysis yesterday was -ve.  Leukocytosis can be due to PSC and retained stone/ sludge.   --Low threshold for antibiotics    Staffed with Dr. Leventhal Susan Lou, MD  Gastroenterology Fellow  Pager [phone]

## 2021-09-03 NOTE — PROGRESS NOTES
Brief Nephrology Note    Chart reviewed. Nephrology has been following for GONZALO felt to be due to large volume ascites drainage on 8/31. Cr has since trended down with daily albumin, reduced ascites drainage, improved nutrition and supportive care. No new Nephrology recommendations at this time and we will sign off. Please feel free to contact our service should further questions arise.    Ana Menjivar MD  Nephrology Fellow  [phone]

## 2021-09-03 NOTE — PLAN OF CARE
[id number]  VSS, afebrile , hemoglobin of 6.3 this AM. 1 unit pRBC ordered and given without complication, awaiting hemoglobin recount. Pt had 1 large bowel movement this afternoon with moderately dark coloration, care team notified. ROMA drained 400 ml at 1100, next drain due at 1700. TF at goal rate of 45 ml/hr. On brandon counts, consumed 50% of breakfast, declined lunch. No bleeding from abdominal incision present this shift. Pt sleeping intermittently throughout shift.

## 2021-09-04 LAB
ALBUMIN SERPL-MCNC: 3.6 G/DL (ref 3.4–5)
ALP SERPL-CCNC: 126 U/L (ref 40–150)
ALT SERPL W P-5'-P-CCNC: 24 U/L (ref 0–50)
ANION GAP SERPL CALCULATED.3IONS-SCNC: 12 MMOL/L (ref 3–14)
AST SERPL W P-5'-P-CCNC: 27 U/L (ref 0–45)
BILIRUB DIRECT SERPL-MCNC: 2.3 MG/DL (ref 0–0.2)
BILIRUB SERPL-MCNC: 2.8 MG/DL (ref 0.2–1.3)
BLD PROD TYP BPU: NORMAL
BLOOD COMPONENT TYPE: NORMAL
BUN SERPL-MCNC: 104 MG/DL (ref 7–30)
CALCIUM SERPL-MCNC: 8.9 MG/DL (ref 8.5–10.1)
CHLORIDE BLD-SCNC: 103 MMOL/L (ref 94–109)
CO2 SERPL-SCNC: 21 MMOL/L (ref 20–32)
CODING SYSTEM: NORMAL
CREAT SERPL-MCNC: 1.15 MG/DL (ref 0.52–1.04)
CROSSMATCH: NORMAL
ERYTHROCYTE [DISTWIDTH] IN BLOOD BY AUTOMATED COUNT: 18.5 % (ref 10–15)
FIBRINOGEN PPP-MCNC: 277 MG/DL (ref 170–490)
GFR SERPL CREATININE-BSD FRML MDRD: 53 ML/MIN/1.73M2
GLUCOSE BLD-MCNC: 116 MG/DL (ref 70–99)
GLUCOSE BLDC GLUCOMTR-MCNC: 141 MG/DL (ref 70–99)
GLUCOSE BLDC GLUCOMTR-MCNC: 148 MG/DL (ref 70–99)
HCT VFR BLD AUTO: 16.5 % (ref 35–47)
HGB BLD-MCNC: 5.5 G/DL (ref 11.7–15.7)
HGB BLD-MCNC: 7.1 G/DL (ref 11.7–15.7)
HGB BLD-MCNC: 7.6 G/DL (ref 11.7–15.7)
HOLD SPECIMEN: NORMAL
INR PPP: 1.58 (ref 0.85–1.15)
ISSUE DATE AND TIME: NORMAL
ISSUE DATE AND TIME: NORMAL
LACTATE SERPL-SCNC: 1.6 MMOL/L (ref 0.7–2)
MAGNESIUM SERPL-MCNC: 2.4 MG/DL (ref 1.6–2.3)
MCH RBC QN AUTO: 30.2 PG (ref 26.5–33)
MCHC RBC AUTO-ENTMCNC: 33.3 G/DL (ref 31.5–36.5)
MCV RBC AUTO: 91 FL (ref 78–100)
PHOSPHATE SERPL-MCNC: 3.1 MG/DL (ref 2.5–4.5)
PLATELET # BLD AUTO: 145 10E3/UL (ref 150–450)
POTASSIUM BLD-SCNC: 4.2 MMOL/L (ref 3.4–5.3)
PROT SERPL-MCNC: 5.6 G/DL (ref 6.8–8.8)
RBC # BLD AUTO: 1.82 10E6/UL (ref 3.8–5.2)
SODIUM SERPL-SCNC: 136 MMOL/L (ref 133–144)
UNIT ABO/RH: NORMAL
UNIT NUMBER: NORMAL
UNIT STATUS: NORMAL
UNIT TYPE ISBT: 6200
UNIT TYPE ISBT: 6200
UNIT TYPE ISBT: 8400
UPPER GI ENDOSCOPY: NORMAL
WBC # BLD AUTO: 14.5 10E3/UL (ref 4–11)

## 2021-09-04 PROCEDURE — 85610 PROTHROMBIN TIME: CPT | Performed by: STUDENT IN AN ORGANIZED HEALTH CARE EDUCATION/TRAINING PROGRAM

## 2021-09-04 PROCEDURE — 250N000011 HC RX IP 250 OP 636: Performed by: INTERNAL MEDICINE

## 2021-09-04 PROCEDURE — 85018 HEMOGLOBIN: CPT | Performed by: INTERNAL MEDICINE

## 2021-09-04 PROCEDURE — 120N000002 HC R&B MED SURG/OB UMMC

## 2021-09-04 PROCEDURE — 83735 ASSAY OF MAGNESIUM: CPT | Performed by: STUDENT IN AN ORGANIZED HEALTH CARE EDUCATION/TRAINING PROGRAM

## 2021-09-04 PROCEDURE — G0500 MOD SEDAT ENDO SERVICE >5YRS: HCPCS | Performed by: INTERNAL MEDICINE

## 2021-09-04 PROCEDURE — 85384 FIBRINOGEN ACTIVITY: CPT | Performed by: INTERNAL MEDICINE

## 2021-09-04 PROCEDURE — 36592 COLLECT BLOOD FROM PICC: CPT | Performed by: INTERNAL MEDICINE

## 2021-09-04 PROCEDURE — 85027 COMPLETE CBC AUTOMATED: CPT | Performed by: STUDENT IN AN ORGANIZED HEALTH CARE EDUCATION/TRAINING PROGRAM

## 2021-09-04 PROCEDURE — C9113 INJ PANTOPRAZOLE SODIUM, VIA: HCPCS | Performed by: INTERNAL MEDICINE

## 2021-09-04 PROCEDURE — P9016 RBC LEUKOCYTES REDUCED: HCPCS | Performed by: INTERNAL MEDICINE

## 2021-09-04 PROCEDURE — 84100 ASSAY OF PHOSPHORUS: CPT | Performed by: STUDENT IN AN ORGANIZED HEALTH CARE EDUCATION/TRAINING PROGRAM

## 2021-09-04 PROCEDURE — 250N000009 HC RX 250: Performed by: INTERNAL MEDICINE

## 2021-09-04 PROCEDURE — 250N000013 HC RX MED GY IP 250 OP 250 PS 637: Performed by: INTERNAL MEDICINE

## 2021-09-04 PROCEDURE — 0DP08YZ REMOVAL OF OTHER DEVICE FROM UPPER INTESTINAL TRACT, VIA NATURAL OR ARTIFICIAL OPENING ENDOSCOPIC: ICD-10-PCS | Performed by: INTERNAL MEDICINE

## 2021-09-04 PROCEDURE — 36592 COLLECT BLOOD FROM PICC: CPT | Performed by: STUDENT IN AN ORGANIZED HEALTH CARE EDUCATION/TRAINING PROGRAM

## 2021-09-04 PROCEDURE — 99233 SBSQ HOSP IP/OBS HIGH 50: CPT | Performed by: INTERNAL MEDICINE

## 2021-09-04 PROCEDURE — 83605 ASSAY OF LACTIC ACID: CPT | Performed by: INTERNAL MEDICINE

## 2021-09-04 PROCEDURE — 258N000003 HC RX IP 258 OP 636: Performed by: INTERNAL MEDICINE

## 2021-09-04 PROCEDURE — 0DC68ZZ EXTIRPATION OF MATTER FROM STOMACH, VIA NATURAL OR ARTIFICIAL OPENING ENDOSCOPIC: ICD-10-PCS | Performed by: INTERNAL MEDICINE

## 2021-09-04 PROCEDURE — 86923 COMPATIBILITY TEST ELECTRIC: CPT | Performed by: INTERNAL MEDICINE

## 2021-09-04 PROCEDURE — 250N000013 HC RX MED GY IP 250 OP 250 PS 637: Performed by: STUDENT IN AN ORGANIZED HEALTH CARE EDUCATION/TRAINING PROGRAM

## 2021-09-04 PROCEDURE — 43235 EGD DIAGNOSTIC BRUSH WASH: CPT | Performed by: INTERNAL MEDICINE

## 2021-09-04 PROCEDURE — P9041 ALBUMIN (HUMAN),5%, 50ML: HCPCS | Performed by: STUDENT IN AN ORGANIZED HEALTH CARE EDUCATION/TRAINING PROGRAM

## 2021-09-04 PROCEDURE — 250N000009 HC RX 250

## 2021-09-04 PROCEDURE — 250N000011 HC RX IP 250 OP 636: Performed by: STUDENT IN AN ORGANIZED HEALTH CARE EDUCATION/TRAINING PROGRAM

## 2021-09-04 PROCEDURE — 250N000013 HC RX MED GY IP 250 OP 250 PS 637: Performed by: NURSE PRACTITIONER

## 2021-09-04 PROCEDURE — 82248 BILIRUBIN DIRECT: CPT | Performed by: STUDENT IN AN ORGANIZED HEALTH CARE EDUCATION/TRAINING PROGRAM

## 2021-09-04 PROCEDURE — 80048 BASIC METABOLIC PNL TOTAL CA: CPT | Performed by: STUDENT IN AN ORGANIZED HEALTH CARE EDUCATION/TRAINING PROGRAM

## 2021-09-04 RX ORDER — LIDOCAINE 40 MG/G
CREAM TOPICAL
Status: CANCELLED | OUTPATIENT
Start: 2021-09-04

## 2021-09-04 RX ORDER — SIMETHICONE 40MG/0.6ML
SUSPENSION, DROPS(FINAL DOSAGE FORM)(ML) ORAL PRN
Status: COMPLETED | OUTPATIENT
Start: 2021-09-04 | End: 2021-09-04

## 2021-09-04 RX ORDER — LIDOCAINE HYDROCHLORIDE 10 MG/ML
10 INJECTION, SOLUTION EPIDURAL; INFILTRATION; INTRACAUDAL; PERINEURAL ONCE
Status: COMPLETED | OUTPATIENT
Start: 2021-09-04 | End: 2021-09-04

## 2021-09-04 RX ORDER — METOCLOPRAMIDE HYDROCHLORIDE 5 MG/ML
10 INJECTION INTRAMUSCULAR; INTRAVENOUS ONCE
Status: COMPLETED | OUTPATIENT
Start: 2021-09-04 | End: 2021-09-04

## 2021-09-04 RX ORDER — LIDOCAINE HYDROCHLORIDE 10 MG/ML
INJECTION, SOLUTION EPIDURAL; INFILTRATION; INTRACAUDAL; PERINEURAL
Status: COMPLETED
Start: 2021-09-04 | End: 2021-09-04

## 2021-09-04 RX ORDER — FENTANYL CITRATE 50 UG/ML
INJECTION, SOLUTION INTRAMUSCULAR; INTRAVENOUS PRN
Status: COMPLETED | OUTPATIENT
Start: 2021-09-04 | End: 2021-09-04

## 2021-09-04 RX ADMIN — URSODIOL 300 MG: 300 CAPSULE ORAL at 21:31

## 2021-09-04 RX ADMIN — CEFTRIAXONE 1 G: 1 INJECTION, POWDER, FOR SOLUTION INTRAMUSCULAR; INTRAVENOUS at 21:29

## 2021-09-04 RX ADMIN — SIMETHICONE 133 MG: 20 SUSPENSION/ DROPS ORAL at 10:14

## 2021-09-04 RX ADMIN — PANTOPRAZOLE SODIUM 40 MG: 40 INJECTION, POWDER, FOR SOLUTION INTRAVENOUS at 21:25

## 2021-09-04 RX ADMIN — FENTANYL CITRATE 50 MCG: 50 INJECTION, SOLUTION INTRAMUSCULAR; INTRAVENOUS at 10:54

## 2021-09-04 RX ADMIN — ONDANSETRON 4 MG: 2 INJECTION INTRAMUSCULAR; INTRAVENOUS at 04:44

## 2021-09-04 RX ADMIN — LACTULOSE 20 G: 10 POWDER, FOR SOLUTION ORAL at 21:06

## 2021-09-04 RX ADMIN — TOPICAL ANESTHETIC 1 SPRAY: 200 SPRAY DENTAL; PERIODONTAL at 10:52

## 2021-09-04 RX ADMIN — METOCLOPRAMIDE HYDROCHLORIDE 10 MG: 5 INJECTION INTRAMUSCULAR; INTRAVENOUS at 10:26

## 2021-09-04 RX ADMIN — ALBUMIN HUMAN 50 G: 0.05 INJECTION, SOLUTION INTRAVENOUS at 08:30

## 2021-09-04 RX ADMIN — Medication 15 ML: at 14:56

## 2021-09-04 RX ADMIN — SODIUM CHLORIDE 8 MG/HR: 9 INJECTION, SOLUTION INTRAVENOUS at 10:20

## 2021-09-04 RX ADMIN — MIDAZOLAM 1 MG: 1 INJECTION INTRAMUSCULAR; INTRAVENOUS at 10:52

## 2021-09-04 RX ADMIN — AMIODARONE HYDROCHLORIDE 200 MG: 200 TABLET ORAL at 14:56

## 2021-09-04 RX ADMIN — OCTREOTIDE ACETATE 50 MCG/HR: 200 INJECTION, SOLUTION INTRAVENOUS; SUBCUTANEOUS at 10:19

## 2021-09-04 RX ADMIN — LIDOCAINE HYDROCHLORIDE 10 ML: 10 INJECTION, SOLUTION EPIDURAL; INFILTRATION; INTRACAUDAL; PERINEURAL at 13:45

## 2021-09-04 RX ADMIN — OXYCODONE HYDROCHLORIDE 5 MG: 5 TABLET ORAL at 22:30

## 2021-09-04 RX ADMIN — URSODIOL 300 MG: 300 CAPSULE ORAL at 14:55

## 2021-09-04 RX ADMIN — FENTANYL CITRATE 100 MCG: 50 INJECTION, SOLUTION INTRAMUSCULAR; INTRAVENOUS at 10:52

## 2021-09-04 RX ADMIN — Medication 5 ML: at 15:06

## 2021-09-04 RX ADMIN — Medication: at 01:41

## 2021-09-04 RX ADMIN — MIDAZOLAM 1 MG: 1 INJECTION INTRAMUSCULAR; INTRAVENOUS at 10:54

## 2021-09-04 ASSESSMENT — ACTIVITIES OF DAILY LIVING (ADL)
ADLS_ACUITY_SCORE: 16

## 2021-09-04 ASSESSMENT — MIFFLIN-ST. JEOR: SCORE: 1145.88

## 2021-09-04 NOTE — PROGRESS NOTES
[facility]    Medicine Progress Note - Hospitalist Service, Cassie Mcclelland       Date of Admission:  8/18/2021    Assessment & Plan           Bertaalfredo Nava is a 55 year old female with 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 She presented in stable condition from home for potential liver transplant. Patient was taken to the OR. Intraop, abdomen was opened, ascites was evacuated and falciform and left triangular ligament divided. At this time, patient developed vtach requiring 5-6 rounds of debrillation and the procedure was aborted. Consideration for starting ECMO, but patient converted out. Patient incisions closed. Taken to CT for concern for PE. CT showed no signs of PE. Initially admitted to SICU due to unstable hemodynamics on 3 pressors. Transferred to MICU 8/22 after coming of pressors. Transferred to floor 8/23.    Main Plans for Today:  - Significant hgb drop to 5.5 this AM  - Transfuse 2u pRBCs  - PPI and Octreotride gtt started  - 1x IV metoclopramide stat  - Discussed with Hepatology and Transplant Surgery  - EGD by Hepatology  - Continue to monitor hemoglobin    # Hepatic Cirrhosis Secondary to PSC/UC  Patient reevaluated for liver transplantation due to arrhythmias. Now relisted for transplant  - ROMA drain x1, intermittent draining at 400 mL q6h   - Hepatology consulted, appreciate recs  - Hold home diuretics given ROMA output  - Continue PTA ursodiol   - Lactulose 20 BID ordered, PRN senna and miralax  - ERCP 8/30 with removal of sludge and stone as well as dilation and stenting. Will need to be preformed q4 weeks until transplanted  - NJT placed for TF for pre-opetative nutritional management    #. Acute blood loss anemia concern for GI bleed  S/p transfusion: 3 units intraoperative. Noted to have Hb drop to 6.3 9/3 AM. No evidence of acute bleed. Transfused 1u pRBCs with  appropriate response. Morning of 9/4 hemoglobin dropped to 5.5 with bloody stools.    - Goals: Hb > 8, Fibrinogen >200, INR < 2, plts > 50  - Transfuse 2u pRBCs  - PPI drip  - Octreotride drip  - IV metoclopramide 10 mg stat  - Continue to monitor hemoglobin  - EGD by GI  - Consider abdominal imaging pending EGD results    #. GONZALO (BL Creat 0.5-0.7)  Significant fluid output from ROMA with net negative fluid status. Likely prerenal GONZALO secondary to hypovolemia. Also consider postrenal related to compression from ascites.   - Nephrology consulted  - ROMA drain plan as above  - UA, urine Na, and renal US ordered  - Continue daily albumin at 50 g of 5%.     #. Intraoperative V tach requiring 4 rounds of defibrillation   #. Distributive vs hypovolemic shock, resolved   Patient had wnl stress ECHO pre-op with EF 60-65%. Intraoperative v tach requiring defibrillation, came to SICU on 3 pressors. TTE post op 8/19 with global and regional left ventricular function is normal with an EF of 60-65%. Off pressors since 8/20.  - MAP goal > 55  - Amiodarone PO taper per EP  - Cards consulted, appreciate recs  - Cronary angiogram 8/25, without disease  - Cardiac MRI without remarkable findings     #. SIRS  Unclear if any infectious source. Initially thought to possibly be cholangitis but less likely per discussion with transplant hepatology. Greatest concern for an intraabdominal source. OR, blood, and sputum cultures with NGTD. EBV IgG positive but IgM not reactive. At this point low suspicion for infection.   - ID consulted  - CTM Cx NGTD, 8/30 Cx with GPC isolated only in broth (indicating likely contaminant)   - Continue CTX 1 g every day per hepatology    #. Post Operative Abdominal Pain  - Oxycodone 5 mg q4 PRN    #. Low Back Pain  -PRN Pako Roque    #. Post operative respiratory failure, resolved   - Extubated, on RA    #. Stress hyperglycemia, resolved  In setting of steroid administration, resolved, sliding scale insulin  "discontinued    #. Deconditioning  - PT and OT consulted    #. Severe Malnutrition in the Context of Acute Illness  -Calorie counts ordered  -PRN ensure ordered  -Will consider Marinol   - NJT placed for TF for pre-opetative nutritional management       Diet: Snacks/Supplements Adult: Other; whatever patient prefers; Between Meals  Combination Diet Regular Diet Adult; 2 gm NA Diet  Adult Formula Drip Feeding: Continuous Osmolite 1.5; Nasojejunal; Goal Rate: 45; mL/hr; Medication - Feeding Tube Flush Frequency: At least 15-30 mL water before and after medication administration and with tube clogging; Amount to Send (Nutrition...  Calorie Counts    DVT Prophylaxis: VTE Prophylaxis contraindicated due to concern for GI bleed  Jurado Catheter: Not present  Central Lines: PRESENT     Code Status: Full Code      Disposition Plan   Expected discharge: 09/07/2021   recommended to transitional care unit once hemoglobin and GONZALO stable.     The patient's care was discussed with the Bedside Nurse, Patient and GI Consultant.    Reginald Franks MD  Hospitalist Service, [address]  Securely message with the Vocera Web Console (learn more here)  Text page via [employer] Paging/Directory  Please see sign in/sign out for up to date coverage information    Clinically Significant Risk Factors Present on Admission                ______________________________________________________________________    Interval History   Hemoglobin acutely drop to 5.5 this AM after transfusion yesterday morning. Patient not reporting any significant GI bleeding. No pain or new significant bruising. No emesis.     4 Point ROS otherwise negative.     Data reviewed today: I reviewed all medications, new labs and imaging results over the last 24 hours. I personally reviewed no images or EKG's today.    Physical Exam   BP (!) 85/43   Pulse 69   Temp 97.6  F (36.4  C)   Resp 12   Ht 1.651 m (5' 5\")   " Wt 56.5 kg (124 lb 9 oz)   SpO2 100%   BMI 20.73 kg/m    General: AAOx3, chronically ill appearing woman in NAD  Skin: large ecchymosis on right posterior arm, scattered bruising diffusely  CV: RRR, normal S1S2, no murmur  Resp: CTAB, no wheeze, rhonchi   Abd: Soft, non-tender, distended, BS+, abdominal staples in place, no oozing or bloody drainage, ROMA draining serosanguinous fluid  Extremities: warm and well perfused, no edema      Data   Recent Labs   Lab 09/04/21  0739 09/04/21  0622 09/04/21  0433 09/04/21  0114 09/03/21  1315 09/03/21  0642 09/02/21 2021 09/02/21  0359   WBC 14.5*  --   --   --   --  13.5*  --  14.3*   HGB 5.5*  --   --   --  7.8* 6.3*  --  8.1*   MCV 91  --   --   --   --  88  --  87   *  --   --   --   --  151  --  200   INR  --  1.58*  --   --   --  1.55*  --  1.48*   NA  --  136  --   --   --  133  --  134   POTASSIUM  --  4.2  --   --   --  4.0  --  3.7   CHLORIDE  --  103  --   --   --  102  --  104   CO2  --  21  --   --   --  19*  --  20   BUN  --  104*  --   --   --  81*  --  56*   CR  --  1.15*  --   --   --  1.20* 1.21* 1.46*   ANIONGAP  --  12  --   --   --  12  --  10   MARIELENA  --  8.9  --   --   --  9.4  --  9.5   GLC  --  116* 148* 141*  --  108*  --  91   ALBUMIN  --  3.6  --   --   --  3.9  --  3.9   PROTTOTAL  --  5.6*  --   --   --  6.0*  --  6.2*   BILITOTAL  --  2.8*  --   --   --  3.5*  --  3.2*   ALKPHOS  --  126  --   --   --  152*  --  206*   ALT  --  24  --   --   --  26  --  30   AST  --  27  --   --   --  32  --  32

## 2021-09-04 NOTE — PROVIDER NOTIFICATION
Provider notification;    Doctor Franks notified at 0730 Via phone page.    Reason for notification critical lab result hemoglobin 5.2 and pt had what appears to be a bloody stool    Plan await callback and continue to monitor.

## 2021-09-04 NOTE — PROVIDER NOTIFICATION
Provider notification;    Doctor Franks notified at 1420 Via web page.    Reason for notification Pt hemoglobin came back @ 7.1 and Lactic acid @ 1.6. Pt's ROMA drain removed. Want to confirm if it is okay to give AM meds.     Plan Provider okayed giving AM meds. Plan is to discontinue albumin in the future and recheck Hgb @ 2000.

## 2021-09-04 NOTE — PROGRESS NOTES
Calorie Count  Intake recorded for: 9/3  Total Kcals: 0 Total Protein: 0g  Kcals from Hospital Food: 0   Protein: 0g  Kcals from Outside Food (average):0 Protein: 0g  # Meals Ordered from Kitchen: 2 meals  # Meals Recorded: No food intake recorded  # Supplements Recorded: No intake recorded    Note: Per Epic food record, pt consumed 50% at 11:30am, but not enough information was given to calculate calories/protein.

## 2021-09-04 NOTE — PLAN OF CARE
"  Problem: Adult Inpatient Plan of Care  Goal: Optimal Comfort and Wellbeing  Outcome: Change based on patient need/priority     Problem: Bowel Motility Impaired (Liver Transplant)  Goal: Effective Bowel Elimination  Outcome: Change based on patient need/priority     Problem: Pain (Liver Transplant)  Goal: Acceptable Pain Control  Outcome: Change based on patient need/priority    Pt is independent with ambulation. Oxy 5 mg PO given for pain. Requested for Bengay for back pain and was applied. Pt is tolerating tube feeding well running at 45 ml/hr. Flushes done. Pt is alert and oriented X 4. Paracentesis fluid drained.  Pt had multiple small BM, color is dark. /55 (BP Location: Left arm)   Pulse 85   Temp 98.1  F (36.7  C) (Oral)   Resp 18   Ht 1.651 m (5' 5\")   Wt 56.4 kg (124 lb 6.4 oz)   SpO2 100%. Blood glucose is 141. Pt is resting at this time. Will continue to monitor.   "

## 2021-09-04 NOTE — PROGRESS NOTES
Brief GI note    Central line in place  Mentating, conversational, denies pain   MADDISON: +Melena, red-tinged stool  Last NPO at 7:30am (tube feeds held)    Plan:  --Transition to IV PPI drip  --Initiate IV octreotide drip  --Complete 2U pRBC transfusion  --1 x dose of Reglan 10mg IV stat for gastric clearance  --NPO  --Hold lovenox  --Plan for EGD today    Discussed with Dr. Leventhal Susan Lou, MD  Gastroenterology Fellow  Pager [phone]

## 2021-09-04 NOTE — PLAN OF CARE
[id number]: Hypotension continues as low as 80/46, pt asymptomatic. Pt has had two soft coffee ground stools this jac. Pt has had no appetite today, only consuming a popsicle. ROMA drain removed, Site CDI.

## 2021-09-04 NOTE — PLAN OF CARE
[id number]    VSS, Afebrile this shift, pt hemoglobin @ 5.2 this AM, recheck @ 5.5. Pt had multiple darker stools this shift. Provider ordered 2 units pRBC and an EGD, procedure complete see note.  NJ tube removed during procedure, provider ordered to have new one placed. Hemoglobin recheck @ 1400 was 7.1, Q6H recheck due @ 2000. Pt also had ROMA drain removed this shift and 1L Albumin discontinued. Pt receiving continuous octreotide infusion @ 10 ml/hr. Pt on clear liquid diet. Continue encouraging oral intake and activity as tolerated.

## 2021-09-04 NOTE — PROCEDURES
[facility] Brief Operative Note    Pre-operative diagnosis: Upper GI bleed   Post-operative diagnosis: Same   Procedure: Procedure(s):  ESOPHAGOGASTRODUODENOSCOPY (EGD)   Surgeon: Thomas M. Leventhal, MD   Assistant(s): None   Anesthesia: Conscious sedation   Estimated blood loss: None   Total IV fluids: (See anesthesia record)  None   Blood transfusion: No transfusion was given during surgery   Total urine output: (See anesthesia record)  None   Drains: None   Specimens: None   Implants: None   Findings: Evidence of esophagitis and large varices.  No overt stigma of recent bleeding. However, with overlying esophagitis this was a possibility.  There was old blood clotted in the body and fundus - unable to clear with irrigation and suction.  There was a feeding tube in place, which was dislodged during the procedure.  No overt ulcerations were noted   Complications: Feeding tube dislodged and removed   Condition: Stable   Comments: - Continue on Octreotide drip  - continue on BID PPI  - Continue on IV Abx  - Replace feeding tube and advance feeds to goal ASAP  - Expect patient to have persistent melena for ~ 48 hours.  If further Hgb drop or hemodynamic instability, call Hepatology on call      Thomas M. Leventhal, M.D.   of Medicine  Advanced/Transplant Hepatology & Critical Care Medicine  [facility]

## 2021-09-04 NOTE — PROVIDER NOTIFICATION
Call received from lab with hemoglobin level of 5.2. MD was notified for a lab recheck. Call back pending.

## 2021-09-04 NOTE — OR NURSING
Procedure: EGD without intervention  Sedation: Conscious sedation  Specimens: NA  O2: 2L NC    Patient tolerated procedure well. Patient stable on transfer to floor by transport. Report called to bedside RN.

## 2021-09-05 LAB
ALBUMIN SERPL-MCNC: 4 G/DL (ref 3.4–5)
ALP SERPL-CCNC: 133 U/L (ref 40–150)
ALT SERPL W P-5'-P-CCNC: 25 U/L (ref 0–50)
ANION GAP SERPL CALCULATED.3IONS-SCNC: 11 MMOL/L (ref 3–14)
AST SERPL W P-5'-P-CCNC: 30 U/L (ref 0–45)
BILIRUB DIRECT SERPL-MCNC: 2.5 MG/DL (ref 0–0.2)
BILIRUB SERPL-MCNC: 3.6 MG/DL (ref 0.2–1.3)
BUN SERPL-MCNC: 81 MG/DL (ref 7–30)
CALCIUM SERPL-MCNC: 9.5 MG/DL (ref 8.5–10.1)
CHLORIDE BLD-SCNC: 106 MMOL/L (ref 94–109)
CO2 SERPL-SCNC: 19 MMOL/L (ref 20–32)
CREAT SERPL-MCNC: 1.02 MG/DL (ref 0.52–1.04)
ERYTHROCYTE [DISTWIDTH] IN BLOOD BY AUTOMATED COUNT: 17.8 % (ref 10–15)
ERYTHROCYTE [DISTWIDTH] IN BLOOD BY AUTOMATED COUNT: 18.1 % (ref 10–15)
GFR SERPL CREATININE-BSD FRML MDRD: 61 ML/MIN/1.73M2
GLUCOSE BLD-MCNC: 97 MG/DL (ref 70–99)
GLUCOSE BLDC GLUCOMTR-MCNC: 102 MG/DL (ref 70–99)
GLUCOSE BLDC GLUCOMTR-MCNC: 89 MG/DL (ref 70–99)
HCT VFR BLD AUTO: 21.9 % (ref 35–47)
HCT VFR BLD AUTO: 23.7 % (ref 35–47)
HGB BLD-MCNC: 7.4 G/DL (ref 11.7–15.7)
HGB BLD-MCNC: 8 G/DL (ref 11.7–15.7)
INR PPP: 1.45 (ref 0.85–1.15)
MAGNESIUM SERPL-MCNC: 2.4 MG/DL (ref 1.6–2.3)
MCH RBC QN AUTO: 30.4 PG (ref 26.5–33)
MCH RBC QN AUTO: 30.6 PG (ref 26.5–33)
MCHC RBC AUTO-ENTMCNC: 33.8 G/DL (ref 31.5–36.5)
MCHC RBC AUTO-ENTMCNC: 33.8 G/DL (ref 31.5–36.5)
MCV RBC AUTO: 90 FL (ref 78–100)
MCV RBC AUTO: 91 FL (ref 78–100)
PHOSPHATE SERPL-MCNC: 3.9 MG/DL (ref 2.5–4.5)
PLATELET # BLD AUTO: 148 10E3/UL (ref 150–450)
PLATELET # BLD AUTO: 149 10E3/UL (ref 150–450)
POTASSIUM BLD-SCNC: 4 MMOL/L (ref 3.4–5.3)
PROT SERPL-MCNC: 6.4 G/DL (ref 6.8–8.8)
RBC # BLD AUTO: 2.42 10E6/UL (ref 3.8–5.2)
RBC # BLD AUTO: 2.63 10E6/UL (ref 3.8–5.2)
SODIUM SERPL-SCNC: 136 MMOL/L (ref 133–144)
WBC # BLD AUTO: 14 10E3/UL (ref 4–11)
WBC # BLD AUTO: 15 10E3/UL (ref 4–11)

## 2021-09-05 PROCEDURE — 250N000011 HC RX IP 250 OP 636: Performed by: STUDENT IN AN ORGANIZED HEALTH CARE EDUCATION/TRAINING PROGRAM

## 2021-09-05 PROCEDURE — 250N000013 HC RX MED GY IP 250 OP 250 PS 637: Performed by: INTERNAL MEDICINE

## 2021-09-05 PROCEDURE — 250N000013 HC RX MED GY IP 250 OP 250 PS 637: Performed by: STUDENT IN AN ORGANIZED HEALTH CARE EDUCATION/TRAINING PROGRAM

## 2021-09-05 PROCEDURE — 93010 ELECTROCARDIOGRAM REPORT: CPT | Performed by: INTERNAL MEDICINE

## 2021-09-05 PROCEDURE — 120N000002 HC R&B MED SURG/OB UMMC

## 2021-09-05 PROCEDURE — 36592 COLLECT BLOOD FROM PICC: CPT | Performed by: STUDENT IN AN ORGANIZED HEALTH CARE EDUCATION/TRAINING PROGRAM

## 2021-09-05 PROCEDURE — 83735 ASSAY OF MAGNESIUM: CPT | Performed by: STUDENT IN AN ORGANIZED HEALTH CARE EDUCATION/TRAINING PROGRAM

## 2021-09-05 PROCEDURE — 99233 SBSQ HOSP IP/OBS HIGH 50: CPT | Mod: GC | Performed by: INTERNAL MEDICINE

## 2021-09-05 PROCEDURE — 258N000003 HC RX IP 258 OP 636: Performed by: INTERNAL MEDICINE

## 2021-09-05 PROCEDURE — 99207 PR CDG-MDM COMPONENT: MEETS HIGH - UP CODED: CPT | Performed by: INTERNAL MEDICINE

## 2021-09-05 PROCEDURE — 85027 COMPLETE CBC AUTOMATED: CPT | Performed by: STUDENT IN AN ORGANIZED HEALTH CARE EDUCATION/TRAINING PROGRAM

## 2021-09-05 PROCEDURE — 250N000011 HC RX IP 250 OP 636: Performed by: INTERNAL MEDICINE

## 2021-09-05 PROCEDURE — 85610 PROTHROMBIN TIME: CPT | Performed by: STUDENT IN AN ORGANIZED HEALTH CARE EDUCATION/TRAINING PROGRAM

## 2021-09-05 PROCEDURE — 93005 ELECTROCARDIOGRAM TRACING: CPT

## 2021-09-05 PROCEDURE — 84100 ASSAY OF PHOSPHORUS: CPT | Performed by: STUDENT IN AN ORGANIZED HEALTH CARE EDUCATION/TRAINING PROGRAM

## 2021-09-05 PROCEDURE — C9113 INJ PANTOPRAZOLE SODIUM, VIA: HCPCS | Performed by: INTERNAL MEDICINE

## 2021-09-05 PROCEDURE — 250N000013 HC RX MED GY IP 250 OP 250 PS 637: Performed by: NURSE PRACTITIONER

## 2021-09-05 PROCEDURE — 82248 BILIRUBIN DIRECT: CPT | Performed by: STUDENT IN AN ORGANIZED HEALTH CARE EDUCATION/TRAINING PROGRAM

## 2021-09-05 PROCEDURE — 80048 BASIC METABOLIC PNL TOTAL CA: CPT | Performed by: STUDENT IN AN ORGANIZED HEALTH CARE EDUCATION/TRAINING PROGRAM

## 2021-09-05 RX ADMIN — LACTULOSE 20 G: 10 POWDER, FOR SOLUTION ORAL at 20:02

## 2021-09-05 RX ADMIN — PANTOPRAZOLE SODIUM 40 MG: 40 INJECTION, POWDER, FOR SOLUTION INTRAVENOUS at 20:05

## 2021-09-05 RX ADMIN — CEFTRIAXONE 1 G: 1 INJECTION, POWDER, FOR SOLUTION INTRAMUSCULAR; INTRAVENOUS at 20:05

## 2021-09-05 RX ADMIN — AMIODARONE HYDROCHLORIDE 200 MG: 200 TABLET ORAL at 08:14

## 2021-09-05 RX ADMIN — URSODIOL 300 MG: 300 CAPSULE ORAL at 08:14

## 2021-09-05 RX ADMIN — OCTREOTIDE ACETATE 50 MCG/HR: 200 INJECTION, SOLUTION INTRAVENOUS; SUBCUTANEOUS at 12:16

## 2021-09-05 RX ADMIN — OXYCODONE HYDROCHLORIDE 5 MG: 5 TABLET ORAL at 08:14

## 2021-09-05 RX ADMIN — Medication 1 PACKET: at 20:06

## 2021-09-05 RX ADMIN — URSODIOL 300 MG: 300 CAPSULE ORAL at 20:06

## 2021-09-05 RX ADMIN — Medication 1 PACKET: at 08:15

## 2021-09-05 RX ADMIN — Medication 15 ML: at 08:15

## 2021-09-05 RX ADMIN — PANTOPRAZOLE SODIUM 40 MG: 40 INJECTION, POWDER, FOR SOLUTION INTRAVENOUS at 08:15

## 2021-09-05 ASSESSMENT — MIFFLIN-ST. JEOR: SCORE: 1156.95

## 2021-09-05 ASSESSMENT — ACTIVITIES OF DAILY LIVING (ADL)
ADLS_ACUITY_SCORE: 16

## 2021-09-05 NOTE — PLAN OF CARE
[id number]    VSS, afebrile, continuous octreotide running @ 10 ml/hr. 5mg oxy given this AM for abdominal pain. Pt progressed to full liquid diet this shift, tolerated well. Pt appetite improved throughout shift. Had 1 coffee ground stool this shift, Hgb @ 8.0 this AM with Q12H CBC recheck due @ 1730. Continue to monitor bowel habits, encourage PO intake and activity.

## 2021-09-05 NOTE — PROGRESS NOTES
Cardiac Electrophysiology Progress Note      Reason For progress note     HPI:   Berta Nava is a 55 year old female with 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 She presented in stable condition from home for potential liver transplant. Patient was taken to the OR. Intraop, abdomen was opened, ascites was evacuated and falciform and left triangular ligament divided. At this time, patient developed vtach requiring 5-6 rounds of debrillation and the procedure was aborted. Consideration for starting ECMO, but patient converted out. Patient incisions closed. Taken to CT for concern for PE. CT showed no signs of PE. Comes to SICU 8/19 due to unstable hemodynamics on 3 pressors. Overnight, angiotensin II was added due to hypotension.    EP were consulted on 08/19 and recommended amiodarone, pt has been on amiodarone IV then switched to oral,  dose was decreased,  today pt is on amiodarone 200 mg po daily (for the last 6 days), no further episode of Vtach were documented.    Pt seen and examined, EKG was done sinus rhythm with slight prolonged QTC at 471.    We recommend to stop her amiodarone (due to her liver failure status)and to be transferred to a tele floor for better monitoring of her HR rate and rhythm and any further episodes of Vtach.    Past Medical History:      Past Medical History:   Diagnosis Date    Ascites     Biliary cirrhosis (H)     Cholangitis, sclerosing     Cirrhosis of liver with ascites (H) 3/3/2021    Hearing loss of left ear     wears a hearing aide    History of low potassium     Hyperlipidemia     Hypertension     SBP (spontaneous bacterial peritonitis) (H) 4/30/2021    Sjogren's syndrome (H)     Ulcerative colitis (H)     Ulcerative pancolitis (H)        Past Surgical  History:      Past Surgical History:   Procedure Laterality Date    CV CORONARY ANGIOGRAM N/A 8/25/2021    Procedure: Coronary  Angiogram with possible intervention;  Surgeon: David Wilhelm MD;  Location:  [facility]    ENDOSCOPIC RETROGRADE CHOLANGIOPANCREATOGRAM N/A 2021    Procedure: ENDOSCOPIC RETROGRADE CHOLANGIOPANCREATOGRAPHY with ballon sweep of bile ducts for stones, balloon dilation of bile ducts and bile duct stent placement;  Surgeon: Gregory Gabriel MD;  Location: [facility] OR    ENDOSCOPIC RETROGRADE CHOLANGIOPANCREATOGRAM N/A 2021    Procedure: ENDOSCOPIC RETROGRADE CHOLANGIOPANCREATOGRAPHY STONE REMOVAL, DILATION AND STENT PLACEMENT;  Surgeon: Gregory Gabriel MD;  Location:  OR    ENDOSCOPIC RETROGRADE CHOLANGIOPANCREATOGRAPHY, EXCHANGE TUBE/STENT N/A 2021    Procedure: ENDOSCOPIC RETROGRADE CHOLANGIOPANCREATOGRAPHY WITH STENT EXCHANGE, STONE EXTRACTION, AND DILATION;  Surgeon: Gregory Gabriel MD;  Location:  OR    ENDOSCOPIC RETROGRADE CHOLANGIOPANCREATOGRAPHY, EXCHANGE TUBE/STENT N/A 5/10/2021    Procedure: ENDOSCOPIC RETROGRADE CHOLANGIOPANCREATOGRAPHY with biliary dilation, stone removal, stent exchange;  Surgeon: Gregory Gabriel MD;  Location:  OR    ENDOSCOPIC RETROGRADE CHOLANGIOPANCREATOGRAPHY, EXCHANGE TUBE/STENT N/A 6/10/2021    Procedure: ENDOSCOPIC RETROGRADE CHOLANGIOPANCREATOGRAPHY, WITH biliary stent exchange, stone removal;  Surgeon: Woodrow Lott MD;  Location:  OR    ENDOSCOPIC RETROGRADE CHOLANGIOPANCREATOGRAPHY, EXCHANGE TUBE/STENT N/A 2021    Procedure: ENDOSCOPIC RETROGRADE CHOLANGIOPANCREATOGRAPHY with biliary dilation, stone removal, stent exchange;  Surgeon: Gregory Gabriel MD;  Location: [facility] OR    GYN SURGERY      bilat fallopian tubes and ovaries removed    PICC DOUBLE LUMEN PLACEMENT Right 2021    42cm (2cm external), Lateral brachial vein    TRANSPLANT LIVER RECIPIENT  DONOR N/A 2021    Procedure: Opening of abdomen, Abdominal Exploration and aborted liver transplant.;  Surgeon: Ernesto Schmtit MD;  Location:  OR        Family History:      Family History   Problem Relation Age of Onset    Cancer Mother         angiosarcoma    Coronary Artery Disease Early Onset Father         MI age 46    Heart Transplant Father     Liver Disease No family hx of     Ulcerative Colitis No family hx of     Crohn's Disease No family hx of        Social History:      Social History     Socioeconomic History    Marital status:      Spouse name: Not on file    Number of children: Not on file    Years of education: Not on file    Highest education level: Not on file   Occupational History    Not on file   Tobacco Use    Smoking status: Never Smoker    Smokeless tobacco: Never Used   Substance and Sexual Activity    Alcohol use: No     Comment: Last drink was 2017    Drug use: No    Sexual activity: Not on file   Other Topics Concern    Parent/sibling w/ CABG, MI or angioplasty before 65F 55M? Not Asked   Social History Narrative    Not on file     Social Determinants of Health     Financial Resource Strain:     Difficulty of Paying Living Expenses:    Food Insecurity:     Worried About Running Out of Food in the Last Year:     Ran Out of Food in the Last Year:    Transportation Needs:     Lack of Transportation (Medical):     Lack of Transportation (Non-Medical):    Physical Activity:     Days of Exercise per Week:     Minutes of Exercise per Session:    Stress:     Feeling of Stress :    Social Connections:     Frequency of Communication with Friends and Family:     Frequency of Social Gatherings with Friends and Family:     Attends Sabianist Services:     Active Member of Clubs or Organizations:     Attends Club or Organization Meetings:     Marital Status:    Intimate Partner Violence:     Fear of Current or Ex-Partner:     Emotionally Abused:     Physically Abused:     Sexually Abused:          I discussed the patient with Luz Maria Chen MD   Cardiac electrophysiology fellow      I have reviewed the laboratory tests,  imaging, and other investigations. I agree with the assessment and plan in this resident/fellow/nurse practitioner's note. In addition, changes in the assessment and plan have been incorporated into the note by myself, as to make it a single cohesive document. Plan was communicated to referring team/physician.    Luz Mraia Guan MD, MS  Cardiology/Cardiac EP Attending Staff

## 2021-09-05 NOTE — PLAN OF CARE
Vss, afebrile. Stools remain dark today. Bleeding noted from right wrist at angiogram site. Dressing changed and pressure applied. Improving appetite. Daughter at bedside. Went outside in the wheelchair today.

## 2021-09-05 NOTE — PLAN OF CARE
"Pt had a medium size BM, color is dark brown. Was given oxy 5 mg PO for abdominal pain. Needs encouragement to drink and to eat. 50% of clear liquid intake. Tolerated PO medication. Blood glucose level is 89. Denies dizziness. Denies nausea. Sandostatin infusing at 10 ml/hr continuously. Hemoglobin level was 7.6. will recheck in am. BP 97/56 (BP Location: Left arm)   Pulse 70   Temp 97  F (36.1  C) (Oral)   Resp 18   Ht 1.651 m (5' 5\")   Wt 56.5 kg (124 lb 9 oz)   SpO2 100%. IV antibiotics given. Will continue to monitor.   "

## 2021-09-05 NOTE — PROGRESS NOTES
[facility]    Medicine Progress Note - Hospitalist Service, Cassie Mcclelland       Date of Admission:  8/18/2021    Assessment & Plan             Bertaalfredo Nava is a 55 year old female with 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 She presented in stable condition from home for potential liver transplant. Patient was taken to the OR. Intraop, abdomen was opened, ascites was evacuated and falciform and left triangular ligament divided. At this time, patient developed vtach requiring 5-6 rounds of debrillation and the procedure was aborted. Consideration for starting ECMO, but patient converted out. Patient incisions closed. Taken to CT for concern for PE. CT showed no signs of PE. Initially admitted to SICU due to unstable hemodynamics on 3 pressors. Transferred to MICU 8/22 after coming of pressors. Transferred to floor 8/23.    Main Plans for Today:  - Continue IV PPI and Octreotride gtt   - Continue to monitor hemoglobin    # Hepatic Cirrhosis Secondary to PSC/UC  Patient reevaluated for liver transplantation due to arrhythmias. Now relisted for transplant  - ROMA drain removed  - Hepatology consulted, appreciate recs  - Hold home diuretics given GONZALO  - Continue PTA ursodiol   - Lactulose 20 BID ordered, PRN senna and miralax  - ERCP 8/30 with removal of sludge and stone as well as dilation and stenting. Will need to be preformed q4 weeks until transplanted  - NJT to be replaced for TF for pre-opetative nutritional management    #. Acute blood loss anemia concern for GI bleed  S/p transfusion: 3 units intraoperative. Noted to have Hb drop to 6.3 9/3 AM. No evidence of acute bleed. Transfused 1u pRBCs with appropriate response. Morning of 9/4 hemoglobin dropped to 5.5 with bloody stools. Given 2 U pRBCs. EGD w/ varices w/o signs of acute bleeding.   - Goals: Hb > 8, Fibrinogen >200, INR  < 2, plts > 50  - CTM CBC q12  - PPI IV BID  - Octreotride drip  - Appreciate GI recommendations      #. GONZALO (BL Creat 0.5-0.7)  Significant fluid output from ROMA with net negative fluid status. Likely prerenal GONZALO secondary to hypovolemia. Also consider postrenal related to compression from ascites.   - Nephrology consulted  - CTM following drain removal     #. Intraoperative V tach requiring 4 rounds of defibrillation   #. Distributive vs hypovolemic shock, resolved   Patient had wnl stress ECHO pre-op with EF 60-65%. Intraoperative v tach requiring defibrillation, came to SICU on 3 pressors. TTE post op 8/19 with global and regional left ventricular function is normal with an EF of 60-65%. Off pressors since 8/20.  - MAP goal > 55  - Continue Amiodarone PO taper for now, will discuss with cards  - Cronary angiogram 8/25, without disease  - Cardiac MRI without remarkable findings     #. SIRS  Unclear if any infectious source. Initially thought to possibly be cholangitis but less likely per discussion with transplant hepatology and given ERCP without s/s. Greatest concern for an intraabdominal source. OR, blood, and sputum cultures with NGTD. EBV IgG positive but IgM not reactive. At this point low suspicion for infection.   - ID consulted  - CTM Cx NGTD, 8/30 Cx with GPC isolated only in broth (indicating likely contaminant)   - Continue CTX 1 g every day per hepatology    #. Post Operative Abdominal Pain  - Oxycodone 5 mg q4 PRN    #. Low Back Pain  -PRN Pako Roque    #. Post operative respiratory failure, resolved   - Extubated, on RA    #. Stress hyperglycemia, resolved  In setting of steroid administration, resolved, sliding scale insulin discontinued    #. Deconditioning  - PT and OT consulted    #. Severe Malnutrition in the Context of Acute Illness  -Calorie counts ordered  -PRN ensure ordered  -Will consider Marinol   -NJT to be replaced for TF for pre-opetative nutritional management       Diet:  "Snacks/Supplements Adult: Other; whatever patient prefers; Between Meals  Adult Formula Drip Feeding: Continuous Osmolite 1.5; Nasojejunal; Goal Rate: 45; mL/hr; Medication - Feeding Tube Flush Frequency: At least 15-30 mL water before and after medication administration and with tube clogging; Amount to Send (Nutrition...  Full Liquid Diet    DVT Prophylaxis: VTE Prophylaxis contraindicated due to concern for GI bleed  Jurado Catheter: Not present  Central Lines: PRESENT       Code Status: Full Code      Disposition Plan   Expected discharge: 09/07/2021   recommended to transitional care unit once hemoglobin and GONZALO stable.     The patient's care was discussed with the Attending Physician, Dr. Franks, Bedside Nurse and Patient.    Augustine Pena MD  Hospitalist Service, [address]  Securely message with the Vocera Web Console (learn more here)  Text page via [employer] Paging/Directory  Please see sign in/sign out for up to date coverage information    Clinically Significant Risk Factors Present on Admission                ______________________________________________________________________    Interval History   NAEO. Patient not reporting any signs of significant GI bleeding. Hb stable. No pain or new significant bruising. No emesis.     4 Point ROS otherwise negative.     Data reviewed today: I reviewed all medications, new labs and imaging results over the last 24 hours. I personally reviewed no images or EKG's today.    Physical Exam   /61 (BP Location: Right arm)   Pulse 71   Temp 97  F (36.1  C) (Oral)   Resp 18   Ht 1.651 m (5' 5\")   Wt 57.6 kg (127 lb)   SpO2 100%   BMI 21.13 kg/m    General: AAOx3, chronically ill appearing woman in NAD  Skin: large ecchymosis on right posterior arm, scattered bruising diffusely  CV: RRR, normal S1S2, no murmur  Resp: CTAB, no wheeze, rhonchi   Abd: Soft, non-tender, distended, BS+, abdominal staples in " place, no oozing or bloody drainage  Extremities: warm and well perfused, no edema      Data   Recent Labs   Lab 09/05/21  0518 09/05/21  0407 09/05/21  0034 09/04/21  2111 09/04/21  1311 09/04/21  0739 09/04/21  0622 09/03/21  1315 09/03/21  0642   WBC 14.0*  --   --   --   --  14.5*  --   --  13.5*   HGB 8.0*  --   --  7.6* 7.1* 5.5*  --   --  6.3*   MCV 90  --   --   --   --  91  --   --  88   *  --   --   --   --  145*  --   --  151   INR 1.45*  --   --   --   --   --  1.58*  --  1.55*     --   --   --   --   --  136  --  133   POTASSIUM 4.0  --   --   --   --   --  4.2  --  4.0   CHLORIDE 106  --   --   --   --   --  103  --  102   CO2 19*  --   --   --   --   --  21  --  19*   BUN 81*  --   --   --   --   --  104*  --  81*   CR 1.02  --   --   --   --   --  1.15*  --  1.20*   ANIONGAP 11  --   --   --   --   --  12  --  12   MARIELENA 9.5  --   --   --   --   --  8.9  --  9.4   GLC 97 102* 89  --   --   --  116*   < > 108*   ALBUMIN 4.0  --   --   --   --   --  3.6  --  3.9   PROTTOTAL 6.4*  --   --   --   --   --  5.6*  --  6.0*   BILITOTAL 3.6*  --   --   --   --   --  2.8*  --  3.5*   ALKPHOS 133  --   --   --   --   --  126  --  152*   ALT 25  --   --   --   --   --  24  --  26   AST 30  --   --   --   --   --  27  --  32    < > = values in this interval not displayed.

## 2021-09-06 LAB
ALBUMIN SERPL-MCNC: 3.6 G/DL (ref 3.4–5)
ALP SERPL-CCNC: 154 U/L (ref 40–150)
ALT SERPL W P-5'-P-CCNC: 25 U/L (ref 0–50)
ANION GAP SERPL CALCULATED.3IONS-SCNC: 9 MMOL/L (ref 3–14)
AST SERPL W P-5'-P-CCNC: 29 U/L (ref 0–45)
BILIRUB DIRECT SERPL-MCNC: 3.6 MG/DL (ref 0–0.2)
BILIRUB SERPL-MCNC: 5 MG/DL (ref 0.2–1.3)
BUN SERPL-MCNC: 56 MG/DL (ref 7–30)
CALCIUM SERPL-MCNC: 9.3 MG/DL (ref 8.5–10.1)
CHLORIDE BLD-SCNC: 107 MMOL/L (ref 94–109)
CO2 SERPL-SCNC: 18 MMOL/L (ref 20–32)
CREAT SERPL-MCNC: 0.88 MG/DL (ref 0.52–1.04)
ERYTHROCYTE [DISTWIDTH] IN BLOOD BY AUTOMATED COUNT: 18.5 % (ref 10–15)
ERYTHROCYTE [DISTWIDTH] IN BLOOD BY AUTOMATED COUNT: 18.8 % (ref 10–15)
GFR SERPL CREATININE-BSD FRML MDRD: 73 ML/MIN/1.73M2
GLUCOSE BLD-MCNC: 86 MG/DL (ref 70–99)
GLUCOSE BLDC GLUCOMTR-MCNC: 110 MG/DL (ref 70–99)
GLUCOSE BLDC GLUCOMTR-MCNC: 95 MG/DL (ref 70–99)
GLUCOSE BLDC GLUCOMTR-MCNC: 98 MG/DL (ref 70–99)
HCT VFR BLD AUTO: 21.6 % (ref 35–47)
HCT VFR BLD AUTO: 23.6 % (ref 35–47)
HGB BLD-MCNC: 7.4 G/DL (ref 11.7–15.7)
HGB BLD-MCNC: 7.8 G/DL (ref 11.7–15.7)
INR PPP: 1.58 (ref 0.85–1.15)
MAGNESIUM SERPL-MCNC: 2.2 MG/DL (ref 1.6–2.3)
MCH RBC QN AUTO: 30.6 PG (ref 26.5–33)
MCH RBC QN AUTO: 30.6 PG (ref 26.5–33)
MCHC RBC AUTO-ENTMCNC: 33.1 G/DL (ref 31.5–36.5)
MCHC RBC AUTO-ENTMCNC: 34.3 G/DL (ref 31.5–36.5)
MCV RBC AUTO: 89 FL (ref 78–100)
MCV RBC AUTO: 93 FL (ref 78–100)
PHOSPHATE SERPL-MCNC: 4.3 MG/DL (ref 2.5–4.5)
PLATELET # BLD AUTO: 147 10E3/UL (ref 150–450)
PLATELET # BLD AUTO: 159 10E3/UL (ref 150–450)
POTASSIUM BLD-SCNC: 4.2 MMOL/L (ref 3.4–5.3)
PROT SERPL-MCNC: 6 G/DL (ref 6.8–8.8)
RBC # BLD AUTO: 2.42 10E6/UL (ref 3.8–5.2)
RBC # BLD AUTO: 2.55 10E6/UL (ref 3.8–5.2)
SODIUM SERPL-SCNC: 134 MMOL/L (ref 133–144)
WBC # BLD AUTO: 11.9 10E3/UL (ref 4–11)
WBC # BLD AUTO: 13.3 10E3/UL (ref 4–11)

## 2021-09-06 PROCEDURE — 36592 COLLECT BLOOD FROM PICC: CPT | Performed by: STUDENT IN AN ORGANIZED HEALTH CARE EDUCATION/TRAINING PROGRAM

## 2021-09-06 PROCEDURE — 250N000013 HC RX MED GY IP 250 OP 250 PS 637: Performed by: STUDENT IN AN ORGANIZED HEALTH CARE EDUCATION/TRAINING PROGRAM

## 2021-09-06 PROCEDURE — 250N000011 HC RX IP 250 OP 636: Performed by: INTERNAL MEDICINE

## 2021-09-06 PROCEDURE — 83735 ASSAY OF MAGNESIUM: CPT | Performed by: STUDENT IN AN ORGANIZED HEALTH CARE EDUCATION/TRAINING PROGRAM

## 2021-09-06 PROCEDURE — 250N000011 HC RX IP 250 OP 636: Performed by: STUDENT IN AN ORGANIZED HEALTH CARE EDUCATION/TRAINING PROGRAM

## 2021-09-06 PROCEDURE — 250N000013 HC RX MED GY IP 250 OP 250 PS 637: Performed by: INTERNAL MEDICINE

## 2021-09-06 PROCEDURE — C9113 INJ PANTOPRAZOLE SODIUM, VIA: HCPCS | Performed by: INTERNAL MEDICINE

## 2021-09-06 PROCEDURE — 85027 COMPLETE CBC AUTOMATED: CPT | Performed by: STUDENT IN AN ORGANIZED HEALTH CARE EDUCATION/TRAINING PROGRAM

## 2021-09-06 PROCEDURE — 85610 PROTHROMBIN TIME: CPT | Performed by: STUDENT IN AN ORGANIZED HEALTH CARE EDUCATION/TRAINING PROGRAM

## 2021-09-06 PROCEDURE — 80048 BASIC METABOLIC PNL TOTAL CA: CPT | Performed by: STUDENT IN AN ORGANIZED HEALTH CARE EDUCATION/TRAINING PROGRAM

## 2021-09-06 PROCEDURE — 84100 ASSAY OF PHOSPHORUS: CPT | Performed by: STUDENT IN AN ORGANIZED HEALTH CARE EDUCATION/TRAINING PROGRAM

## 2021-09-06 PROCEDURE — 214N000001 HC R&B CCU UMMC

## 2021-09-06 PROCEDURE — 99233 SBSQ HOSP IP/OBS HIGH 50: CPT | Mod: GC | Performed by: INTERNAL MEDICINE

## 2021-09-06 PROCEDURE — 82248 BILIRUBIN DIRECT: CPT | Performed by: STUDENT IN AN ORGANIZED HEALTH CARE EDUCATION/TRAINING PROGRAM

## 2021-09-06 RX ORDER — FUROSEMIDE 40 MG
40 TABLET ORAL DAILY
Status: DISCONTINUED | OUTPATIENT
Start: 2021-09-06 | End: 2021-09-09 | Stop reason: HOSPADM

## 2021-09-06 RX ORDER — SPIRONOLACTONE 50 MG/1
100 TABLET, FILM COATED ORAL DAILY
Status: DISCONTINUED | OUTPATIENT
Start: 2021-09-06 | End: 2021-09-09 | Stop reason: HOSPADM

## 2021-09-06 RX ADMIN — LACTULOSE 20 G: 10 POWDER, FOR SOLUTION ORAL at 08:54

## 2021-09-06 RX ADMIN — SPIRONOLACTONE 100 MG: 50 TABLET ORAL at 11:35

## 2021-09-06 RX ADMIN — PANTOPRAZOLE SODIUM 40 MG: 40 INJECTION, POWDER, FOR SOLUTION INTRAVENOUS at 08:54

## 2021-09-06 RX ADMIN — PANTOPRAZOLE SODIUM 40 MG: 40 INJECTION, POWDER, FOR SOLUTION INTRAVENOUS at 20:32

## 2021-09-06 RX ADMIN — Medication 1 PACKET: at 08:54

## 2021-09-06 RX ADMIN — LACTULOSE 20 G: 10 POWDER, FOR SOLUTION ORAL at 20:32

## 2021-09-06 RX ADMIN — URSODIOL 300 MG: 300 CAPSULE ORAL at 08:54

## 2021-09-06 RX ADMIN — OXYCODONE HYDROCHLORIDE 5 MG: 5 TABLET ORAL at 13:27

## 2021-09-06 RX ADMIN — Medication 5 ML: at 21:18

## 2021-09-06 RX ADMIN — CEFTRIAXONE 1 G: 1 INJECTION, POWDER, FOR SOLUTION INTRAMUSCULAR; INTRAVENOUS at 22:37

## 2021-09-06 RX ADMIN — FUROSEMIDE 40 MG: 40 TABLET ORAL at 11:35

## 2021-09-06 RX ADMIN — OXYCODONE HYDROCHLORIDE 5 MG: 5 TABLET ORAL at 03:46

## 2021-09-06 RX ADMIN — URSODIOL 300 MG: 300 CAPSULE ORAL at 20:32

## 2021-09-06 RX ADMIN — Medication 15 ML: at 08:54

## 2021-09-06 ASSESSMENT — ACTIVITIES OF DAILY LIVING (ADL)
ADLS_ACUITY_SCORE: 16

## 2021-09-06 ASSESSMENT — MIFFLIN-ST. JEOR: SCORE: 1162.84

## 2021-09-06 NOTE — PROGRESS NOTES
Two nurse head-to-toe skin assessment performed by Randi Murrieta and Laury Childs.  Noted to have large bruise to posterior right upper arm, incision to upper abdomen with staples, small incision in left groin, and small bruises on arms and legs.

## 2021-09-06 NOTE — PLAN OF CARE
"  Problem: Infection (Liver Transplant)  Goal: Absence of Infection Signs and Symptoms  Outcome: Change based on patient need/priority     Pt is afebrile. Stated pain or discomfort is tolerable tonight. Denies nausea or vomiting. Ate 50% of her liquid diet. Blood glucose monitored. Able to ambulate in her room to the bath. IV antibiotics given. /56 (BP Location: Left arm)   Pulse 74   Temp 99  F (37.2  C) (Oral)   Resp 18   Ht 1.651 m (5' 5\")   Wt 57.6 kg (127 lb)   SpO2 99%. Had small BM, color is dark brown in color. Will monitor.   "

## 2021-09-06 NOTE — PLAN OF CARE
Vss. Baseline hypotension. Denies dizziness when up. No BMs this shift. Voiding adequate amounts. Diuretics started back up today. Pt up 6lbs today. Appetite improving, but still needs encouragement. Old ROMA drain site CDI. Required oxy x1 today. Up walking the halls and outside today. Pt transferred to  for cardiac monitoring. Report called and pt sent down with all belongings. Daughter at bedside.

## 2021-09-06 NOTE — PROGRESS NOTES
[facility]    Medicine Progress Note - Hospitalist Service, Cassie Mcclelland       Date of Admission:  8/18/2021    Assessment & Plan             Bertaalfredo Nava is a 55 year old female with 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 She presented in stable condition from home for potential liver transplant. Patient was taken to the OR. Intraop, abdomen was opened, ascites was evacuated and falciform and left triangular ligament divided. At this time, patient developed vtach requiring 5-6 rounds of debrillation and the procedure was aborted. Consideration for starting ECMO, but patient converted out. Patient incisions closed. Taken to CT for concern for PE. CT showed no signs of PE. Initially admitted to SICU due to unstable hemodynamics on 3 pressors. Transferred to MICU 8/22 after coming of pressors. Transferred to floor 8/23.    Main Plans for Today:  - NJT to be replaced for TF for pre-opetative nutritional management, to be done 9/7 per discussion with radiology  - Resume PTA lasix and aldactone   - Discontinue Amiodarone per discussion with cards, will monitor patient on telemetry     # Hepatic Cirrhosis Secondary to PSC/UC  Patient reevaluated for liver transplantation due to arrhythmias. Now relisted for transplant  - ROMA drain removed  - Hepatology consulted, appreciate recs  - Resume PTA lasix and aldactone   - Continue PTA ursodiol   - Lactulose 20 BID ordered, PRN senna and miralax  - ERCP 8/30 with removal of sludge and stone as well as dilation and stenting. Will need to be preformed q4 weeks until transplanted  - NJT to be replaced for TF for pre-opetative nutritional management, to be done 9/7 per discussion with radiology    #. Acute blood loss anemia concern for GI bleed  S/p transfusion: 3 units intraoperative. Noted to have Hb drop to 6.3 9/3 AM. No evidence of acute bleed.  Transfused 1u pRBCs with appropriate response. Morning of 9/4 hemoglobin dropped to 5.5 with bloody stools. Given 2 U pRBCs. EGD w/ varices w/o signs of acute bleeding.   - Transfuse yasmin maintain Hb > 7  - CTM CBC q12  - PPI IV BID  - Octreotride drip  - Appreciate GI recommendations      #. GONZALO (BL Creat 0.5-0.7)  Significant fluid output from ROMA with net negative fluid status. Likely prerenal GONZALO secondary to hypovolemia. Also consider postrenal related to compression from ascites.   - Nephrology consulted  - CTM following drain removal   - Resume diuretics as above    #. Intraoperative V tach requiring 4 rounds of defibrillation   #. Distributive vs hypovolemic shock, resolved   Patient had wnl stress ECHO pre-op with EF 60-65%. Intraoperative v tach requiring defibrillation, came to SICU on 3 pressors. TTE post op 8/19 with global and regional left ventricular function is normal with an EF of 60-65%. Off pressors since 8/20.  - MAP goal > 55  - Discontinue Amiodarone per discussion with cards, will monitor patient on telemetry   - Cronary angiogram 8/25, without disease  - Cardiac MRI without remarkable findings     #. SIRS  Unclear if any infectious source. Initially thought to possibly be cholangitis but less likely per discussion with transplant hepatology and given ERCP without s/s. Greatest concern for an intraabdominal source. OR, blood, and sputum cultures with NGTD. EBV IgG positive but IgM not reactive. At this point low suspicion for infection.   - ID consulted  - CTM Cx NGTD, 8/30 Cx with GPC isolated only in broth (indicating likely contaminant)   - Continue CTX 1 g every day per hepatology    #. Post Operative Abdominal Pain  - Oxycodone 5 mg q4 PRN    #. Low Back Pain  -PRN Pako Roque    #. Post operative respiratory failure, resolved   - Extubated, on RA    #. Stress hyperglycemia, resolved  In setting of steroid administration, resolved, sliding scale insulin discontinued    #. Deconditioning  - PT  and OT consulted    #. Severe Malnutrition in the Context of Acute Illness  -Calorie counts ordered  -PRN ensure ordered  -Will consider Marinol   -NJT to be replaced for TF for pre-opetative nutritional management, to be done 9/7 per discussion with radiology       Diet: Snacks/Supplements Adult: Other; whatever patient prefers; Between Meals  Adult Formula Drip Feeding: Continuous Osmolite 1.5; Nasojejunal; Goal Rate: 45; mL/hr; Medication - Feeding Tube Flush Frequency: At least 15-30 mL water before and after medication administration and with tube clogging; Amount to Send (Nutrition...  Full Liquid Diet    DVT Prophylaxis: VTE Prophylaxis contraindicated due to concern for GI bleed  Jurado Catheter: Not present  Central Lines: PRESENT  CVC TRIPLE LUMEN Right Internal jugular-Site Assessment: WDL    Code Status: Full Code      Disposition Plan   Expected discharge: 09/07/2021   recommended to transitional care unit once hemoglobin and GONZALO stable.     The patient's care was discussed with the Attending Physician, Dr. Franks, Bedside Nurse and Patient.    Augustine Pena MD  Hospitalist Service, [address]  Securely message with the Vocera Web Console (learn more here)  Text page via [email or web address] Paging/Directory  Please see sign in/sign out for up to date coverage information    Clinically Significant Risk Factors Present on Admission                ______________________________________________________________________    Interval History   NAEO. Patient not reporting any signs of significant GI bleeding. Hb stable. No pain or new significant bruising. No emesis. Denies abdominal pain this AM or difficulties with PO intake.    4 Point ROS otherwise negative.     Data reviewed today: I reviewed all medications, new labs and imaging results over the last 24 hours. I personally reviewed no images or EKG's today.    Physical Exam   /56 (BP Location: Left arm)    "Pulse 72   Temp 97.7  F (36.5  C) (Oral)   Resp 17   Ht 1.651 m (5' 5\")   Wt 58.2 kg (128 lb 4.8 oz)   SpO2 98%   BMI 21.35 kg/m    General: AAOx3, chronically ill appearing woman in NAD  Skin: large ecchymosis on right posterior arm, scattered bruising diffusely  CV: RRR, normal S1S2, no murmur  Resp: CTAB, no wheeze, rhonchi   Abd: Soft, non-tender, mildly distended, BS+, abdominal staples in place, no oozing or bloody drainage, gauze covering previous drain site  Extremities: warm and well perfused, no edema      Data   Recent Labs   Lab 09/06/21  0620 09/06/21  0349 09/06/21  0018 09/05/21  1707 09/05/21  0518 09/04/21  0739 09/04/21  0622   WBC 11.9*  --   --  15.0* 14.0*  --   --    HGB 7.4*  --   --  7.4* 8.0*  --   --    MCV 89  --   --  91 90  --   --    *  --   --  149* 148*  --   --    INR 1.58*  --   --   --  1.45*  --  1.58*     --   --   --  136  --  136   POTASSIUM 4.2  --   --   --  4.0  --  4.2   CHLORIDE 107  --   --   --  106  --  103   CO2 18*  --   --   --  19*  --  21   BUN 56*  --   --   --  81*  --  104*   CR 0.88  --   --   --  1.02  --  1.15*   ANIONGAP 9  --   --   --  11  --  12   MARIELENA 9.3  --   --   --  9.5  --  8.9   GLC 86 98 95  --  97   < > 116*   ALBUMIN 3.6  --   --   --  4.0  --  3.6   PROTTOTAL 6.0*  --   --   --  6.4*  --  5.6*   BILITOTAL 5.0*  --   --   --  3.6*  --  2.8*   ALKPHOS 154*  --   --   --  133  --  126   ALT 25  --   --   --  25  --  24   AST 29  --   --   --  30  --  27    < > = values in this interval not displayed.     "

## 2021-09-06 NOTE — PLAN OF CARE
Oxy 5 mg PO given for complaint or abdominal pain. Pt is ambulating in her room to the back. Blood glucose level at 0400 was 96. Will continue to monitor.

## 2021-09-07 ENCOUNTER — APPOINTMENT (OUTPATIENT)
Dept: OCCUPATIONAL THERAPY | Facility: CLINIC | Age: 56
DRG: 420 | End: 2021-09-07
Attending: STUDENT IN AN ORGANIZED HEALTH CARE EDUCATION/TRAINING PROGRAM
Payer: COMMERCIAL

## 2021-09-07 ENCOUNTER — APPOINTMENT (OUTPATIENT)
Dept: GENERAL RADIOLOGY | Facility: CLINIC | Age: 56
DRG: 420 | End: 2021-09-07
Attending: INTERNAL MEDICINE
Payer: COMMERCIAL

## 2021-09-07 ENCOUNTER — COMMITTEE REVIEW (OUTPATIENT)
Dept: TRANSPLANT | Facility: CLINIC | Age: 56
End: 2021-09-07

## 2021-09-07 DIAGNOSIS — K74.60 CIRRHOSIS OF LIVER (H): Primary | ICD-10-CM

## 2021-09-07 LAB
ALBUMIN SERPL-MCNC: 3.7 G/DL (ref 3.4–5)
ALP SERPL-CCNC: 179 U/L (ref 40–150)
ALT SERPL W P-5'-P-CCNC: 26 U/L (ref 0–50)
ANION GAP SERPL CALCULATED.3IONS-SCNC: 11 MMOL/L (ref 3–14)
AST SERPL W P-5'-P-CCNC: 28 U/L (ref 0–45)
BILIRUB DIRECT SERPL-MCNC: 4.3 MG/DL (ref 0–0.2)
BILIRUB SERPL-MCNC: 5.9 MG/DL (ref 0.2–1.3)
BUN SERPL-MCNC: 44 MG/DL (ref 7–30)
CALCIUM SERPL-MCNC: 9.3 MG/DL (ref 8.5–10.1)
CHLORIDE BLD-SCNC: 103 MMOL/L (ref 94–109)
CO2 SERPL-SCNC: 18 MMOL/L (ref 20–32)
CREAT SERPL-MCNC: 0.85 MG/DL (ref 0.52–1.04)
ERYTHROCYTE [DISTWIDTH] IN BLOOD BY AUTOMATED COUNT: 18.7 % (ref 10–15)
GFR SERPL CREATININE-BSD FRML MDRD: 76 ML/MIN/1.73M2
GLUCOSE BLD-MCNC: 113 MG/DL (ref 70–99)
GLUCOSE BLDC GLUCOMTR-MCNC: 123 MG/DL (ref 70–99)
GLUCOSE BLDC GLUCOMTR-MCNC: 85 MG/DL (ref 70–99)
GLUCOSE BLDC GLUCOMTR-MCNC: 90 MG/DL (ref 70–99)
HCT VFR BLD AUTO: 24.6 % (ref 35–47)
HGB BLD-MCNC: 7.9 G/DL (ref 11.7–15.7)
INR PPP: 1.56 (ref 0.85–1.15)
MAGNESIUM SERPL-MCNC: 1.9 MG/DL (ref 1.6–2.3)
MCH RBC QN AUTO: 30.3 PG (ref 26.5–33)
MCHC RBC AUTO-ENTMCNC: 32.1 G/DL (ref 31.5–36.5)
MCV RBC AUTO: 94 FL (ref 78–100)
PHOSPHATE SERPL-MCNC: 4.2 MG/DL (ref 2.5–4.5)
PLATELET # BLD AUTO: 169 10E3/UL (ref 150–450)
POTASSIUM BLD-SCNC: 3.8 MMOL/L (ref 3.4–5.3)
PROT SERPL-MCNC: 6.3 G/DL (ref 6.8–8.8)
RBC # BLD AUTO: 2.61 10E6/UL (ref 3.8–5.2)
SODIUM SERPL-SCNC: 132 MMOL/L (ref 133–144)
WBC # BLD AUTO: 13.1 10E3/UL (ref 4–11)

## 2021-09-07 PROCEDURE — 97535 SELF CARE MNGMENT TRAINING: CPT | Mod: GO

## 2021-09-07 PROCEDURE — 250N000009 HC RX 250: Performed by: RADIOLOGY

## 2021-09-07 PROCEDURE — C9113 INJ PANTOPRAZOLE SODIUM, VIA: HCPCS | Performed by: INTERNAL MEDICINE

## 2021-09-07 PROCEDURE — 250N000013 HC RX MED GY IP 250 OP 250 PS 637: Performed by: INTERNAL MEDICINE

## 2021-09-07 PROCEDURE — 83735 ASSAY OF MAGNESIUM: CPT | Performed by: STUDENT IN AN ORGANIZED HEALTH CARE EDUCATION/TRAINING PROGRAM

## 2021-09-07 PROCEDURE — 84100 ASSAY OF PHOSPHORUS: CPT | Performed by: STUDENT IN AN ORGANIZED HEALTH CARE EDUCATION/TRAINING PROGRAM

## 2021-09-07 PROCEDURE — 250N000011 HC RX IP 250 OP 636: Performed by: INTERNAL MEDICINE

## 2021-09-07 PROCEDURE — 85610 PROTHROMBIN TIME: CPT | Performed by: STUDENT IN AN ORGANIZED HEALTH CARE EDUCATION/TRAINING PROGRAM

## 2021-09-07 PROCEDURE — 36592 COLLECT BLOOD FROM PICC: CPT | Performed by: STUDENT IN AN ORGANIZED HEALTH CARE EDUCATION/TRAINING PROGRAM

## 2021-09-07 PROCEDURE — 97530 THERAPEUTIC ACTIVITIES: CPT | Mod: GO

## 2021-09-07 PROCEDURE — 97165 OT EVAL LOW COMPLEX 30 MIN: CPT | Mod: GO

## 2021-09-07 PROCEDURE — 80048 BASIC METABOLIC PNL TOTAL CA: CPT | Performed by: STUDENT IN AN ORGANIZED HEALTH CARE EDUCATION/TRAINING PROGRAM

## 2021-09-07 PROCEDURE — 250N000011 HC RX IP 250 OP 636: Performed by: STUDENT IN AN ORGANIZED HEALTH CARE EDUCATION/TRAINING PROGRAM

## 2021-09-07 PROCEDURE — 82248 BILIRUBIN DIRECT: CPT | Performed by: STUDENT IN AN ORGANIZED HEALTH CARE EDUCATION/TRAINING PROGRAM

## 2021-09-07 PROCEDURE — 85027 COMPLETE CBC AUTOMATED: CPT | Performed by: STUDENT IN AN ORGANIZED HEALTH CARE EDUCATION/TRAINING PROGRAM

## 2021-09-07 PROCEDURE — 99233 SBSQ HOSP IP/OBS HIGH 50: CPT | Mod: GC | Performed by: INTERNAL MEDICINE

## 2021-09-07 PROCEDURE — 74340 X-RAY GUIDE FOR GI TUBE: CPT | Mod: 26 | Performed by: RADIOLOGY

## 2021-09-07 PROCEDURE — 44500 INTRO GASTROINTESTINAL TUBE: CPT

## 2021-09-07 PROCEDURE — 99232 SBSQ HOSP IP/OBS MODERATE 35: CPT | Mod: GC | Performed by: INTERNAL MEDICINE

## 2021-09-07 PROCEDURE — 214N000001 HC R&B CCU UMMC

## 2021-09-07 PROCEDURE — 250N000013 HC RX MED GY IP 250 OP 250 PS 637: Performed by: STUDENT IN AN ORGANIZED HEALTH CARE EDUCATION/TRAINING PROGRAM

## 2021-09-07 PROCEDURE — 999N000147 HC STATISTIC PT IP EVAL DEFER

## 2021-09-07 PROCEDURE — 0DHA7UZ INSERTION OF FEEDING DEVICE INTO JEJUNUM, VIA NATURAL OR ARTIFICIAL OPENING: ICD-10-PCS | Performed by: RADIOLOGY

## 2021-09-07 RX ORDER — PANTOPRAZOLE SODIUM 40 MG/1
40 TABLET, DELAYED RELEASE ORAL
Status: DISCONTINUED | OUTPATIENT
Start: 2021-09-07 | End: 2021-09-09 | Stop reason: HOSPADM

## 2021-09-07 RX ORDER — LIDOCAINE HYDROCHLORIDE 20 MG/ML
5 SOLUTION OROPHARYNGEAL ONCE
Status: COMPLETED | OUTPATIENT
Start: 2021-09-07 | End: 2021-09-07

## 2021-09-07 RX ORDER — CIPROFLOXACIN 500 MG/1
500 TABLET, FILM COATED ORAL EVERY 24 HOURS
Status: DISCONTINUED | OUTPATIENT
Start: 2021-09-07 | End: 2021-09-09 | Stop reason: HOSPADM

## 2021-09-07 RX ORDER — MAGNESIUM SULFATE HEPTAHYDRATE 40 MG/ML
2 INJECTION, SOLUTION INTRAVENOUS ONCE
Status: COMPLETED | OUTPATIENT
Start: 2021-09-07 | End: 2021-09-07

## 2021-09-07 RX ORDER — POTASSIUM CHLORIDE 750 MG/1
10 TABLET, EXTENDED RELEASE ORAL ONCE
Status: COMPLETED | OUTPATIENT
Start: 2021-09-07 | End: 2021-09-07

## 2021-09-07 RX ADMIN — Medication 15 ML: at 08:07

## 2021-09-07 RX ADMIN — CIPROFLOXACIN 500 MG: 500 TABLET, FILM COATED ORAL at 15:42

## 2021-09-07 RX ADMIN — SPIRONOLACTONE 100 MG: 50 TABLET ORAL at 08:07

## 2021-09-07 RX ADMIN — Medication 1 PACKET: at 20:13

## 2021-09-07 RX ADMIN — LACTULOSE 20 G: 10 POWDER, FOR SOLUTION ORAL at 20:10

## 2021-09-07 RX ADMIN — PANTOPRAZOLE SODIUM 40 MG: 40 TABLET, DELAYED RELEASE ORAL at 15:42

## 2021-09-07 RX ADMIN — LACTULOSE 20 G: 10 POWDER, FOR SOLUTION ORAL at 08:08

## 2021-09-07 RX ADMIN — LIDOCAINE HYDROCHLORIDE 15 ML: 20 SOLUTION ORAL; TOPICAL at 14:10

## 2021-09-07 RX ADMIN — Medication 5 ML: at 20:12

## 2021-09-07 RX ADMIN — POTASSIUM CHLORIDE 10 MEQ: 750 TABLET, EXTENDED RELEASE ORAL at 11:39

## 2021-09-07 RX ADMIN — PANTOPRAZOLE SODIUM 40 MG: 40 INJECTION, POWDER, FOR SOLUTION INTRAVENOUS at 08:08

## 2021-09-07 RX ADMIN — URSODIOL 300 MG: 300 CAPSULE ORAL at 20:11

## 2021-09-07 RX ADMIN — FUROSEMIDE 40 MG: 40 TABLET ORAL at 08:07

## 2021-09-07 RX ADMIN — MAGNESIUM SULFATE HEPTAHYDRATE 2 G: 40 INJECTION, SOLUTION INTRAVENOUS at 11:43

## 2021-09-07 RX ADMIN — URSODIOL 300 MG: 300 CAPSULE ORAL at 08:07

## 2021-09-07 ASSESSMENT — ACTIVITIES OF DAILY LIVING (ADL)
ADLS_ACUITY_SCORE: 14
ADLS_ACUITY_SCORE: 14
ADLS_ACUITY_SCORE: 13
ADLS_ACUITY_SCORE: 13
PREVIOUS_RESPONSIBILITIES: MEAL PREP;HOUSEKEEPING
ADLS_ACUITY_SCORE: 14
ADLS_ACUITY_SCORE: 14

## 2021-09-07 NOTE — COMMITTEE REVIEW
Abdominal Committee Review Note     Evaluation Date: 3/23/2021  Committee Review Date: 9/7/2021    Organ being evaluated for: Liver    Transplant Phase: Waitlist  Transplant Status: Active    Transplant Coordinator: Wilmer Lazar Jr.  Transplant Surgeon:   Ernesto Schmitt    Referring Physician: Venus London    Primary Diagnosis: Primary Sclerosing Cholangitis: Ulcerative Colitis  Secondary Diagnosis:     Committee Review Members:  Nutrition Vania Cornejo, RD   Pharmacist Sanjay Huston, [facility]    - Clinical Helene Denney, JOSETTE, Katie Mejia, [facility]   Transplant Vanessa Durant, RN, Marjorie Reyes, RN, Lucy James, RN, Monalisa Bar, RN, Jr Wilmer Lazar, ADIRANA, Simona Traylor MD, Andria Rowe APRN CNP   Transplant Hepatology  Scott Puri MD, Emma Mao MD, Fabio Gotti MD, Thomas M. Leventhal, MD   Transplant Surgery Ernesto Schmitt MD, Gonzalo Mackay MD, Sri Wilks MD       Transplant Eligibility: Cirrhosis with MELD, PSC    Committee Review Decision: Remain Active    Relative Contraindications: None    Absolute Contraindications: None    Committee Chair Leventhal, Thomas Michael, MD verbally attested to the committee's decision.    Committee Discussion Details:     Remain active

## 2021-09-07 NOTE — PLAN OF CARE
"DX: liver failure   PMH:   PSC/PUC causing hepatic cirrhosis complicated by ascites, biliary strictures and choledocholithiasis, recurrent cholangitis. Liver transplant surgery was complicated by vtach and resulted in 5-6 rounds of dfib and pt not receiving a new liver.    Code Status: Full  Team: Cassie Mcclelland    Cardiac: NSR  Respiratory: clear lung sounds  Neuro: AxO x4  Pain: denies  GI/: pt stated that she urinates very well, but struggles with bowel incontinence. States that she cannot feel when she has a bowel movement and it \"just leaks out\"  Diet: Full liquid/tube feeds  Skin: large abdominal incision closed with staples, looks unremarkable  Activity: independent    Gtts/fluid: octreotide running at 50 mcg/hr    Plan: discharge to  TCU when cards signs off     "

## 2021-09-07 NOTE — PROGRESS NOTES
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 She presented in stable condition from home for potential liver transplant. Patient was taken to the OR. Intraop, abdomen was opened, ascites was evacuated and falciform and left triangular ligament divided. At this time, patient developed vtach requiring 5-6 rounds of debrillation and the procedure was aborted. Consideration for starting ECMO, but patient converted out. Patient incisions closed. Taken to CT for concern for PE. CT showed no signs of PE. Initially admitted to SICU due to unstable hemodynamics on 3 pressors. Transferred to MICU 8/22 after coming of pressors. Transferred to floor 8/23.    Main Plans for Today:  - NJT to be replaced for TF for pre-opetative nutritional management, to be done 9/7 per discussion with radiology  - No current indication for paracentesis   - PT and OT reconsulted, hepatology reccomending TCU   - Will transition to PO PPI BID  - Discontinue octreotride drip  - Discontinued CTX as now s/p 7 day empiric course    # Hepatic Cirrhosis Secondary to PSC/UC  Patient reevaluated for liver transplantation due to arrhythmias. Now relisted for transplant  - ROMA drain removed  - Hepatology consulted, appreciate recs  - Continue PTA lasix and aldactone   - Continue PTA ursodiol   - Lactulose 20 BID ordered, PRN senna and miralax  - Cipro for SBP PPx  - ERCP 8/30 with removal of sludge and stone as well as dilation and stenting. Will need to be preformed q4 weeks until transplanted  - NJT to be replaced for TF for pre-opetative nutritional management, to be done 9/7 per discussion with radiology    #. Acute blood loss anemia concern for  GI bleed  S/p transfusion: 3 units intraoperative. Noted to have Hb drop to 6.3 9/3 AM. No evidence of acute bleed. Transfused 1u pRBCs with appropriate response. Morning of 9/4 hemoglobin dropped to 5.5 with bloody stools. Given 2 U pRBCs. EGD w/ varices w/o signs of acute bleeding.   - Transfuse yasmin maintain Hb > 7  - Will transition to PO PPI BID  - Discontinue octreotride drip  - Appreciate GI recommendations      #. GONZALO (BL Creat 0.5-0.7)  Significant fluid output from ROMA with net negative fluid status. Likely prerenal GONZALO secondary to hypovolemia. Also consider postrenal related to compression from ascites.   - Nephrology consulted  - CTM following drain removal   - Resume diuretics as above    #. Intraoperative V tach requiring 4 rounds of defibrillation   #. Distributive vs hypovolemic shock, resolved   Patient had wnl stress ECHO pre-op with EF 60-65%. Intraoperative v tach requiring defibrillation, came to SICU on 3 pressors. TTE post op 8/19 with global and regional left ventricular function is normal with an EF of 60-65%. Off pressors since 8/20.  - MAP goal > 55  - Discontinued Amiodarone per discussion with cards, will monitor patient on telemetry for at least 24 hours (through 9/7)    - Cronary angiogram 8/25, without disease  - Cardiac MRI without remarkable findings     #. SIRS  Unclear if any infectious source. Initially thought to possibly be cholangitis but less likely per discussion with transplant hepatology and given ERCP without s/s. Greatest concern for an intraabdominal source. OR, blood, and sputum cultures with NGTD. EBV IgG positive but IgM not reactive. At this point low suspicion for infection.   - ID consulted  - CTM Cx NGTD, 8/30 Cx with GPC isolated only in broth (indicating likely contaminant)   - Discontinued CTX as now s/p 7 day empiric course    #. Post Operative Abdominal Pain  - Oxycodone 5 mg q4 PRN    #. Low Back Pain  -PRN Pako Roque    #. Post operative respiratory failure,  resolved   - Extubated, on RA    #. Stress hyperglycemia, resolved  In setting of steroid administration, resolved, sliding scale insulin discontinued    #. Deconditioning  - PT and OT consulted, hepatology reccomending TCU     #. Severe Malnutrition in the Context of Acute Illness  -Calorie counts ordered  -PRN ensure ordered  -Will consider Marinol   -NJT to be replaced for TF for pre-opetative nutritional management, to be done 9/7 per discussion with radiology       Diet: Snacks/Supplements Adult: Other; whatever patient prefers; Between Meals  Adult Formula Drip Feeding: Continuous Osmolite 1.5; Nasojejunal; Goal Rate: 45; mL/hr; Medication - Feeding Tube Flush Frequency: At least 15-30 mL water before and after medication administration and with tube clogging; Amount to Send (Nutrition...  Combination Diet 2 gm NA Diet    DVT Prophylaxis: VTE Prophylaxis contraindicated due to concern for GI bleed  Jurado Catheter: Not present  Central Lines: PRESENT  CVC TRIPLE LUMEN Right Internal jugular-Site Assessment: WDL    Code Status: Full Code      Disposition Plan   Expected discharge: 09/07/2021   recommended to transitional care unit once safe disposition plan/ TCU bed available.     The patient's care was discussed with the Attending Physician, Dr. Patel, Bedside Nurse and Patient.    Augustine Pena MD  Hospitalist Service, [address]  Securely message with the Vocera Web Console (learn more here)  Text page via [facility] Paging/Directory  Please see sign in/sign out for up to date coverage information    Clinically Significant Risk Factors Present on Admission                ______________________________________________________________________    Interval History   NAEO. Patient reports she feels well this AM. No pain or new significant bruising. No emesis. Denies abdominal pain this AM or difficulties with PO intake.    4 Point ROS otherwise negative.     Data  "reviewed today: I reviewed all medications, new labs and imaging results over the last 24 hours. I personally reviewed no images or EKG's today.    Physical Exam   /69   Pulse 72   Temp 97.9  F (36.6  C) (Oral)   Resp 18   Ht 1.651 m (5' 5\")   Wt 58.2 kg (128 lb 4.8 oz)   SpO2 100%   BMI 21.35 kg/m    General: AAOx3, chronically ill appearing woman in NAD  Skin: large ecchymosis on right posterior arm, scattered bruising diffusely  CV: RRR, normal S1S2, no murmur  Resp: CTAB, no wheeze, rhonchi   Abd: Soft, non-tender, mildly distended, BS+, abdominal staples in place, no oozing or bloody drainage, gauze covering previous drain site  Extremities: warm and well perfused, no edema      Data   Recent Labs   Lab 09/07/21  0705 09/07/21  0629 09/06/21  2116 09/06/21  1706 09/06/21  0620 09/05/21  1707 09/05/21  0518   WBC 13.1*  --   --  13.3* 11.9*  --  14.0*   HGB 7.9*  --   --  7.8* 7.4*  --  8.0*   MCV 94  --   --  93 89  --  90     --   --  159 147*  --  148*   INR 1.56*  --   --   --  1.58*  --  1.45*   *  --   --   --  134  --  136   POTASSIUM 3.8  --   --   --  4.2  --  4.0   CHLORIDE 103  --   --   --  107  --  106   CO2 18*  --   --   --  18*  --  19*   BUN 44*  --   --   --  56*  --  81*   CR 0.85  --   --   --  0.88  --  1.02   ANIONGAP 11  --   --   --  9  --  11   MARIELENA 9.3  --   --   --  9.3  --  9.5   * 85 110*  --  86   < > 97   ALBUMIN 3.7  --   --   --  3.6  --  4.0   PROTTOTAL 6.3*  --   --   --  6.0*  --  6.4*   BILITOTAL 5.9*  --   --   --  5.0*  --  3.6*   ALKPHOS 179*  --   --   --  154*  --  133   ALT 26  --   --   --  25  --  25   AST 28  --   --   --  29  --  30    < > = values in this interval not displayed.     "

## 2021-09-07 NOTE — PROGRESS NOTES
Transfer  Transferred from: 7D  Via:bed  Reason for transfer:Pt appropriate for 6C- in need of tele   Family: Aware of transfer  Belongings: Sent with pt  Chart: Sent with pt  Medications: Meds from bin sent with pt  Report called from: 7D RN

## 2021-09-07 NOTE — PLAN OF CARE
PT 6C: New PT orders received. Patient previously followed by PT this admission and was able to ambulate and navigate stairs independently, therefore discharged from PT. OT re-evaluated patient this morning, per OT and patient no mobility concerns with discharge, anticipate safe discharge home with assist from supportive spouse for heavier ADL/IADLs. No acute PT needs at this time.Will complete orders.

## 2021-09-07 NOTE — PLAN OF CARE
Admitted after she presented for potential liver transplant. Pt developed VTach during evacuation of ascites that needed 5-6 rounds of defibrillation and the procedure was aborted. She is on the floor for better monitoring of HR and rhythm.  PMH: hepatic cirrhosis complicated by ascites, biliary strictures and choledocholithiasis, recurrent cholangitis.     Code status: Full Code    Team: Cassie Mcclelland     Neuro: AOx4, Calm and cooperative  Cardiac/Tele: SR, HR in the (60's-70's) other VSS (see flowsheets)  Respiratory: RA. Sats > 95%. No SOB  GI/: Voids without difficulty and last BM was 9/6. Uses the bathroom independently  Diet/Appetite: Full liquid diet. TF to be started 9/7  Skin: Abdominal incision intact BART. Groin site dressed.   Endocrine: BG checks. Electrolytes WNL  LDAs: Triple lumen R. Jugular CVC. Octreotide running at 50mcg/hr  Activity: Up ad indy  Pain: Denies pain     Plan: NJT for TF and Continue to monitor HR/Rhythm

## 2021-09-07 NOTE — PROGRESS NOTES
"[facility]    Hepatology Follow-up    CC: VTach arrest intraoperatively during liver transplant    Dx: PSC Cirrhosis   VTach arrest   Quiescent Ulcerative Colitis   GONZALO, improving     24 hour events:   NAEO.     Subjective:  Reports feeling well overall. Waiting for FT placement today with radiology. Patiently awaiting call for transplant. Explains that she is ready to go to Rehab to gain more strength. Feels strong, but knows that with her reduced appetite she is likely not as strong as she needs to be. Otherwise, she denies abdominal pain, nausea, and vomiting. Bowel movements x2 this morning, have been loose and \"coffee ground\" in appearance. When further discussed she does report that they are black in color. Suspects that this is result of the lactulose.     Medications  Current Facility-Administered Medications   Medication Dose Route Frequency     cefTRIAXone  1 g Intravenous Q24H     [Held by provider] enoxaparin ANTICOAGULANT  40 mg Subcutaneous Q24H     furosemide  40 mg Oral Daily     heparin lock flush  5-20 mL Intracatheter Q24H     lactulose  20 g Oral BID     multivitamins w/minerals  15 mL Per Feeding Tube Daily     pantoprazole (PROTONIX) IV  40 mg Intravenous BID     protein modular  1 packet Per Feeding Tube BID     sodium chloride (PF)  10-40 mL Intracatheter Q8H     spironolactone  100 mg Oral Daily     ursodiol  300 mg Oral BID       Review of systems  A 10-point review of systems was negative, unless stated above    Examination  /69   Pulse 72   Temp 97.9  F (36.6  C) (Oral)   Resp 18   Ht 1.651 m (5' 5\")   Wt 58.2 kg (128 lb 4.8 oz)   SpO2 100%   BMI 21.35 kg/m      Intake/Output Summary (Last 24 hours) at 9/3/2021 0807  Last data filed at 9/3/2021 0713  Gross per 24 hour   Intake 2645 ml   Output 2250 ml   Net 395 ml       General: Looks well, NAD  Eyes: Scleral icterics  CVS: RRR  Resp: CTA, no labored breathing  Abdomen: Soft, non tender, distended, " abdominal incision intact with staples, no drainage  Extremities: warm, no edema  Neuro: AAO X 3, no asterixis  Skin: no rash  Psych: normal mood    Laboratory    Lab Results   Component Value Date     09/07/2021     06/25/2021    POTASSIUM 3.8 09/07/2021    POTASSIUM 4.4 06/25/2021    CHLORIDE 103 09/07/2021    CHLORIDE 100 06/25/2021    CO2 18 09/07/2021    CO2 21 06/25/2021    BUN 44 09/07/2021    BUN 23 06/25/2021    CR 0.85 09/07/2021    CR 0.69 06/25/2021       Lab Results   Component Value Date    BILITOTAL 5.9 09/07/2021    BILITOTAL 3.3 06/25/2021    ALT 26 09/07/2021    ALT 32 06/25/2021    AST 28 09/07/2021    AST 58 06/25/2021    ALKPHOS 179 09/07/2021    ALKPHOS 386 06/25/2021       Lab Results   Component Value Date    WBC 13.1 09/07/2021    WBC 15.2 06/25/2021    HGB 7.9 09/07/2021    HGB 10.7 06/25/2021    MCV 94 09/07/2021    MCV 92 06/25/2021     09/07/2021     06/25/2021       Lab Results   Component Value Date    INR 1.56 09/07/2021    INR 1.39 06/25/2021       MELD-Na score: 22 at 9/7/2021  7:05 AM  MELD score: 18 at 9/7/2021  7:05 AM  Calculated from:  Serum Creatinine: 0.85 mg/dL (Using min of 1 mg/dL) at 9/7/2021  7:05 AM  Serum Sodium: 132 mmol/L at 9/7/2021  7:05 AM  Total Bilirubin: 5.9 mg/dL at 9/7/2021  7:05 AM  INR(ratio): 1.56 at 9/7/2021  7:05 AM  Age: 56 years     Assessment  56F w/ history of HTN, quiescent ulcerative colitis, pancreatic mucinous cystadenoma, PSC cirrhosis who was admitted on 8/18/21for liver transplantation surgery. Intraoperatively experienced VTach arrest and surgery was aborted.  Post-operatively, pt initially required pressors, but is now clinically improved.  Followup up coronary cath and cardiac MRI unremarkable.  Cardiac siderosis less likely given non-elevated ferritin. Empiric antibiotics for leukocytosis of unexplained etiology, now completed 7 day course of ceftriaxone. Her GONZALO is improving with albumin. She is now re-activated  "on liver transplant list.     #. Cirrhosis 2/2 PSC (Active on Transplant list)  #. Intra-operative VT (during attempt at liver transplantation surgery on 8/18/21)  She has been requiring ERCP every 4 weeks with stent placement as bridge to liver transplantation.  Patient underwent ERCP with removal of stone and sludge, right main hepatic duct was dilated and restented. Last ERCP 8/30/21   -- Monitor LFTs and INR daily  -- Discontinue octreotide ggt  -- Lactulose PO, or Miralax    -discharge w/Lactulose PRN at home  -- Continue Ursodiol  -- FT placement w/radiology 9/7  -- Ensure sodium restriction to 2000 mg per day  -- Adequate protein diet (1.2 - 1.5g/kg/day)                 - Recommend multiple meals during the day, Snacks and shakes during the day/night                 - Can use Ensure/Boost drinks TID  -- PT/OT evaluated with discharge recommendation to home with home care OT   -- Repeat ERCP in 4 weeks if not transplanted before  -- DISCONTINUE daily statin once discharged       #. GONZALO, improving   #. Acute on chronic anemia, likely dilutional (albumin)    #. Intra-abdominal ROMA drain - removed  Patient has been requiring weekly paracentesis, had ROMA drain after her reported transplant surgery. With h/o cirrhosis and ascites there was concern for increased output secondary as well as increased risk of developing SBP. With this removal, she might require frequent paracenteses for which we will continue to monitor.   -- Continue PTA diuretics (restarted 9/6)   -- Paracenteses prior to discharge then can plan for weekly outpatient paracenteses   -- Start SBP prophylaxis - Ciprofloxacin 500 mg daily   -- Agree with NJ replacement for pre-operative nutritional optimization   -- Ok for discharge to home per OT evaluation       Patient staffed with attending physician, Dr. Leventhal Jacquelin Blomker MS4     \"I was present with the student who participated in the service and in the documentation of the note. I have " "verified the history and personally performed the physical exam and medical decisionmaking. I agree with the assessment and plan of care as documented in the note.\"    Allyssa Coreas MD  Gastroenterology Fellow  Pager [phone]      "

## 2021-09-07 NOTE — PLAN OF CARE
Temp: 98.2  F (36.8  C) Temp src: Oral BP: 103/67 Pulse: 71   Resp: 18 SpO2: 99 % O2 Device: None (Room air)      Running: TF at 15mL/hr. 30mL flush Q4hrs. Increase rate by 10mL Q8hrs  Change: NJ tube placed around 1400. TF started at 1530. K+ and mag replaced    A:   Neuro: A/Ox4. Calls appropriately. Pleasant and cooperative.  Cardiac/Tele:  VVS. SR. Afebrile. Denies chest pain   Respiratory: Room air. Tolerating well  GI/: Continent. Had multiple small BM per pt.   Diet/Appetite: Combination diet. BG- Q4hrs  Skin: No new deficits noted. Abdomen incision WDL. Staples in place  LDAs: Triple lumen CVC  Activity: Up ad indy  Pain: Denies pain    P: Discharge to a TCU tomorrow. Continue to monitor and notify team with changes.

## 2021-09-07 NOTE — PROGRESS NOTES
SPIRITUAL HEALTH SERVICES  SPIRITUAL ASSESSMENT Progress Note  [facility] ([place] 6C     REFERRAL SOURCE: initial 6C unit  visit with pt, who had an extended stay on ICU.     Pt spoke of her appreciation for being anointed by   while she was on ICU. Her understanding is that she will most likely be discharged soon.     PLAN: no further needs indicated before discharge    Kevan Alfred M.Div. (Bill), [facility]  Staff   Pager [phone]      * [facility] remains available 24/7 for emergent requests/referrals, either by having the switchboard page the on-call  or by entering an ASAP/STAT consult in Epic (this will also page the on-call ). Routine Epic consults receive an initial response within 24 hours.*

## 2021-09-07 NOTE — PROGRESS NOTES
"CLINICAL NUTRITION SERVICES     Nutrition Prescription    RECOMMENDATIONS FOR MDs/PROVIDERS TO ORDER:  None at this time.     Malnutrition Status:    None at this time.     Recommendations already ordered by Registered Dietitian (RD):  None at this time.    Future/Additional Recommendations:  See prior RD notes. TF orders remain active.     Checking kcal counts    Diet: 2 g Sodium (9/7), comment \"high protein.\" Has a prn oral supplement order.  Intake: Fair appetite. Consumed 50% of meals on 9/3 and ~9/6.    Kcal counts:   9/2         Total Kcals: 490       Total Protein: 24g. # Meals Ordered from Kitchen: 2 meals. # Meals Recorded: 1 meal (First - 100% 2 servings of cheerios, coffee). # Supplements Recorded: 100% 1 Ensure Enlive    9/3         # Meals Ordered from Kitchen: 2 meals. # Meals Recorded: No food intake recorded. # Supplements Recorded: No intake recorded. Note: Per Epic food record, pt consumed 50% at 11:30am, but not enough information was given to calculate calories/protein.   9/4         No data available in EMR   * Not meeting estimated needs of ~5491-8689 kcals and ~84 g protein daily.     TF orders: Osmolite 1.5 Chris with goal of 45ml/hr  (1080ml/day) provides 1620 kcals (29 kcal/kg), 68 g PRO (1.2 g/kg), 823 ml free H20, 220 g CHO, and 0 g fiber daily. + 2 pkts ProSource per day. Total provisions: 1700 kcal (30 kcal/kg) and 90 g PRO (1.6 g/kg). Last received TFs on 9/4 as feeding tube was dislodged during EGD. Radiology replaced feeding tube today (9/7), tip at the duodenojejunal junction.      INTERVENTIONS:  Implementation:  None additionally at this time.     Follow up/Monitoring:  Will continue to follow pt.    Camille Wiggins, MS, RD, LD, [facility]   6C Pgr: [phone]  "

## 2021-09-07 NOTE — PROGRESS NOTES
09/07/21 1226   Quick Adds   Type of Visit Occupational Therapy Re-evaluation       Present no   Language English   Living Environment   People in home spouse   Current Living Arrangements house   Home Accessibility stairs to enter home;stairs within home   Number of Stairs, Main Entrance 2   Stair Railings, Main Entrance none   Number of Stairs, Within Home, Primary 8;other (see comments)  (8 up/down)   Stair Railings, Within Home, Primary railing on right side (ascending)   Transportation Anticipated car, drives self;family or friend will provide   Living Environment Comments Pt reports living in a 3-story home with a basement included, main level includes kitchen, and 2nd level includes main bathroom with walk-in shower no grab bars, and toilet with vanity placed next to it.    Self-Care   Usual Activity Tolerance good   Current Activity Tolerance good   Regular Exercise Yes   Activity/Exercise Type walking   Exercise Amount/Frequency 3-5 times/wk   Equipment Currently Used at Home none   Activity/Exercise/Self-Care Comment Pt reports ADL IND at baseline however expresses concern for going home due to LOS in hospital   Instrumental Activities of Daily Living (IADL)   Previous Responsibilities meal prep;housekeeping   IADL Comments Pt reports IADL IND at baseline and supportive spouse can assist prn   Disability/Function   Hearing Difficulty or Deaf no   Wear Glasses or Blind yes   Vision Management glasses   Concentrating, Remembering or Making Decisions Difficulty no   Difficulty Communicating no   Difficulty Eating/Swallowing no   Walking or Climbing Stairs Difficulty no   Dressing/Bathing Difficulty no   Toileting issues no   Doing Errands Independently Difficulty (such as shopping) no   Fall history within last six months no   Change in Functional Status Since Onset of Current Illness/Injury yes   General Information   Onset of Illness/Injury or Date of Surgery 08/18/21   Referring  Physician Augustine Pena MD   Patient/Family Therapy Goal Statement (OT) Return home   Additional Occupational Profile Info/Pertinent History of Current Problem Per chart, Berta Nava is a 55 year old female with 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 She presented in stable condition from home for potential liver transplant. Patient was taken to the OR. Intraop, abdomen was opened, ascites was evacuated and falciform and left triangular ligament divided. At this time, patient developed vtach requiring 5-6 rounds of debrillation and the procedure was aborted. Consideration for starting ECMO, but patient converted out. Patient incisions closed. Taken to CT for concern for PE. CT showed no signs of PE. Initially admitted to SICU due to unstable hemodynamics on 3 pressors. Transferred to MICU 8/22 after coming of pressors. Transferred to floor 8/23.   Existing Precautions/Restrictions abdominal;cardiac   Limitations/Impairments safety/cognitive   Left Upper Extremity (Weight-bearing Status) partial weight-bearing (PWB)  (10 lb. )   Right Upper Extremity (Weight-bearing Status) partial weight-bearing (PWB)  (10 lb.)   Left Lower Extremity (Weight-bearing Status) full weight-bearing (FWB)   Right Lower Extremity (Weight-bearing Status) full weight-bearing (FWB)   General Observations and Info Activity: Up ad Karime   Cognitive Status Examination   Orientation Status orientation to person, place and time   Visual Perception   Visual Impairment/Limitations WFL;corrective lenses for reading   Sensory   Sensory Quick Adds No deficits were identified   Pain Assessment   Patient Currently in Pain No   Integumentary/Edema   Integumentary/Edema no deficits were identifed   Posture   Posture not impaired   Posture Comments while seated EOB   Range of Motion Comprehensive   General Range of Motion no range of motion deficits  identified;bilateral upper extremity ROM WFL   Comment, General Range of Motion B UE AROM WFL during ADL tasks   Strength Comprehensive (MMT)   General Manual Muscle Testing (MMT) Assessment upper extremity strength deficits identified   Comment, General Manual Muscle Testing (MMT) Assessment overall generalized weakness due to extended hospital LOS   Upper Extremity (Manual Muscle Testing)   Comment, MMT: Upper Extremity B UE formal MMT not assessed due to abdominal precautions   Muscle Tone Assessment   Muscle Tone Quick Adds No deficits were identified   Coordination   Upper Extremity Coordination No deficits were identified   Bed Mobility   Bed Mobility No deficits identified   Comment (Bed Mobility) IND supine>seated EOB   Transfers   Transfers No deficits identified   Transfer Comments SBA STS from EOB   Balance   Balance Assessment no deficits were identified   Activities of Daily Living   BADL Assessment No deficits identified   Clinical Impression   Criteria for Skilled Therapeutic Interventions Met (OT) yes;meets criteria   OT Diagnosis Decreased activity tolerance and deconditioned impacting ADL/IADL function    OT Problem List-Impairments impacting ADL problems related to;activity tolerance impaired   Assessment of Occupational Performance 1-3 Performance Deficits   Identified Performance Deficits FB dressing, home mgmt tasks, activity tolerance    Planned Therapy Interventions (OT) ADL retraining;IADL retraining;ROM;strengthening;home program guidelines;progressive activity/exercise   Clinical Decision Making Complexity (OT) low complexity   Therapy Frequency (OT) 1x eval and treat   Predicted Duration of Therapy 1 day   Anticipated Equipment Needs Upon Discharge (OT)   (TBD)   Risk & Benefits of therapy have been explained evaluation/treatment results reviewed;care plan/treatment goals reviewed;participants included;patient   Comment-Clinical Impression Pt presents near baseline ADL function and  overall functional mobility. Anticipate pt will be able to discharge home with assist from spouse for heavier ADL/IADL tasks, HH OT to progress ADL IND/safety.    OT Discharge Planning    OT Discharge Recommendation (DC Rec) Home with assist;home with home care occupational therapy   OT Rationale for DC Rec Pt presenting near baseline for ADL function and overall activity tolerance. Pt up IND in bathroom and able to complete stairs with SBA and no LOB. Pt reports supportive spouse at home and daughter who lives local who can assist prn for heavier ADL/IADL tasks. Recommending home with assist and home OT to progress ADL IND/safety in the home environment.    OT Brief overview of current status  IND toileting, SBA functional transfers/mobility, and stairs no AD    Total Evaluation Time (Minutes)   Total Evaluation Time (Minutes) 5

## 2021-09-07 NOTE — PLAN OF CARE
Occupational Therapy Discharge Summary    Reason for therapy discharge:    Discharged to home with home therapy.    Progress towards therapy goal(s). See goals on Care Plan in [facility] electronic health record for goal details.  Goals met    Therapy recommendation(s):    Continued therapy is recommended.  Rationale/Recommendations:  Pt presenting near baseline for ADL function and overall mobility. Recommending home with assist from supportive spouse for heavier ADL/IADL tasks and home health care OT to progress ADL IND/safety in the home environment. No further acute OT needs identified.

## 2021-09-08 ENCOUNTER — HOME INFUSION (PRE-WILLOW HOME INFUSION) (OUTPATIENT)
Dept: PHARMACY | Facility: CLINIC | Age: 56
End: 2021-09-08

## 2021-09-08 LAB
ALBUMIN SERPL-MCNC: 3.2 G/DL (ref 3.4–5)
ALP SERPL-CCNC: 216 U/L (ref 40–150)
ALT SERPL W P-5'-P-CCNC: 24 U/L (ref 0–50)
ANION GAP SERPL CALCULATED.3IONS-SCNC: 11 MMOL/L (ref 3–14)
AST SERPL W P-5'-P-CCNC: 30 U/L (ref 0–45)
ATRIAL RATE - MUSE: 73 BPM
BILIRUB DIRECT SERPL-MCNC: 3.9 MG/DL (ref 0–0.2)
BILIRUB SERPL-MCNC: 5.1 MG/DL (ref 0.2–1.3)
BUN SERPL-MCNC: 32 MG/DL (ref 7–30)
CALCIUM SERPL-MCNC: 9 MG/DL (ref 8.5–10.1)
CHLORIDE BLD-SCNC: 102 MMOL/L (ref 94–109)
CO2 SERPL-SCNC: 19 MMOL/L (ref 20–32)
CREAT SERPL-MCNC: 0.81 MG/DL (ref 0.52–1.04)
DIASTOLIC BLOOD PRESSURE - MUSE: NORMAL MMHG
ERYTHROCYTE [DISTWIDTH] IN BLOOD BY AUTOMATED COUNT: 18.7 % (ref 10–15)
GFR SERPL CREATININE-BSD FRML MDRD: 80 ML/MIN/1.73M2
GLUCOSE BLD-MCNC: 118 MG/DL (ref 70–99)
GLUCOSE BLDC GLUCOMTR-MCNC: 100 MG/DL (ref 70–99)
GLUCOSE BLDC GLUCOMTR-MCNC: 115 MG/DL (ref 70–99)
GLUCOSE BLDC GLUCOMTR-MCNC: 115 MG/DL (ref 70–99)
GLUCOSE BLDC GLUCOMTR-MCNC: 120 MG/DL (ref 70–99)
GLUCOSE BLDC GLUCOMTR-MCNC: 126 MG/DL (ref 70–99)
GLUCOSE BLDC GLUCOMTR-MCNC: 141 MG/DL (ref 70–99)
GLUCOSE BLDC GLUCOMTR-MCNC: 147 MG/DL (ref 70–99)
HCT VFR BLD AUTO: 23.5 % (ref 35–47)
HGB BLD-MCNC: 7.8 G/DL (ref 11.7–15.7)
INR PPP: 1.6 (ref 0.85–1.15)
INTERPRETATION ECG - MUSE: NORMAL
MAGNESIUM SERPL-MCNC: 2.2 MG/DL (ref 1.6–2.3)
MCH RBC QN AUTO: 30.2 PG (ref 26.5–33)
MCHC RBC AUTO-ENTMCNC: 33.2 G/DL (ref 31.5–36.5)
MCV RBC AUTO: 91 FL (ref 78–100)
P AXIS - MUSE: -3 DEGREES
PHOSPHATE SERPL-MCNC: 3.2 MG/DL (ref 2.5–4.5)
PLATELET # BLD AUTO: 179 10E3/UL (ref 150–450)
POTASSIUM BLD-SCNC: 4 MMOL/L (ref 3.4–5.3)
PR INTERVAL - MUSE: 168 MS
PROT SERPL-MCNC: 6.1 G/DL (ref 6.8–8.8)
QRS DURATION - MUSE: 98 MS
QT - MUSE: 428 MS
QTC - MUSE: 471 MS
R AXIS - MUSE: 16 DEGREES
RBC # BLD AUTO: 2.58 10E6/UL (ref 3.8–5.2)
SODIUM SERPL-SCNC: 132 MMOL/L (ref 133–144)
SYSTOLIC BLOOD PRESSURE - MUSE: NORMAL MMHG
T AXIS - MUSE: 4 DEGREES
VENTRICULAR RATE- MUSE: 73 BPM
WBC # BLD AUTO: 12.6 10E3/UL (ref 4–11)

## 2021-09-08 PROCEDURE — 214N000001 HC R&B CCU UMMC

## 2021-09-08 PROCEDURE — 250N000011 HC RX IP 250 OP 636: Performed by: STUDENT IN AN ORGANIZED HEALTH CARE EDUCATION/TRAINING PROGRAM

## 2021-09-08 PROCEDURE — 250N000013 HC RX MED GY IP 250 OP 250 PS 637: Performed by: STUDENT IN AN ORGANIZED HEALTH CARE EDUCATION/TRAINING PROGRAM

## 2021-09-08 PROCEDURE — 80048 BASIC METABOLIC PNL TOTAL CA: CPT | Performed by: STUDENT IN AN ORGANIZED HEALTH CARE EDUCATION/TRAINING PROGRAM

## 2021-09-08 PROCEDURE — 99232 SBSQ HOSP IP/OBS MODERATE 35: CPT | Mod: GC | Performed by: INTERNAL MEDICINE

## 2021-09-08 PROCEDURE — 83735 ASSAY OF MAGNESIUM: CPT | Performed by: STUDENT IN AN ORGANIZED HEALTH CARE EDUCATION/TRAINING PROGRAM

## 2021-09-08 PROCEDURE — 85027 COMPLETE CBC AUTOMATED: CPT | Performed by: STUDENT IN AN ORGANIZED HEALTH CARE EDUCATION/TRAINING PROGRAM

## 2021-09-08 PROCEDURE — 82248 BILIRUBIN DIRECT: CPT | Performed by: STUDENT IN AN ORGANIZED HEALTH CARE EDUCATION/TRAINING PROGRAM

## 2021-09-08 PROCEDURE — 84100 ASSAY OF PHOSPHORUS: CPT | Performed by: STUDENT IN AN ORGANIZED HEALTH CARE EDUCATION/TRAINING PROGRAM

## 2021-09-08 PROCEDURE — 36592 COLLECT BLOOD FROM PICC: CPT | Performed by: STUDENT IN AN ORGANIZED HEALTH CARE EDUCATION/TRAINING PROGRAM

## 2021-09-08 PROCEDURE — 85610 PROTHROMBIN TIME: CPT | Performed by: STUDENT IN AN ORGANIZED HEALTH CARE EDUCATION/TRAINING PROGRAM

## 2021-09-08 RX ADMIN — PANTOPRAZOLE SODIUM 40 MG: 40 TABLET, DELAYED RELEASE ORAL at 08:53

## 2021-09-08 RX ADMIN — LACTULOSE 20 G: 10 POWDER, FOR SOLUTION ORAL at 20:23

## 2021-09-08 RX ADMIN — URSODIOL 300 MG: 300 CAPSULE ORAL at 08:53

## 2021-09-08 RX ADMIN — PANTOPRAZOLE SODIUM 40 MG: 40 TABLET, DELAYED RELEASE ORAL at 16:21

## 2021-09-08 RX ADMIN — SPIRONOLACTONE 100 MG: 50 TABLET ORAL at 08:54

## 2021-09-08 RX ADMIN — Medication 5 ML: at 07:18

## 2021-09-08 RX ADMIN — Medication 5 ML: at 20:25

## 2021-09-08 RX ADMIN — FUROSEMIDE 40 MG: 40 TABLET ORAL at 08:54

## 2021-09-08 RX ADMIN — LACTULOSE 20 G: 10 POWDER, FOR SOLUTION ORAL at 08:54

## 2021-09-08 RX ADMIN — Medication 1 PACKET: at 20:25

## 2021-09-08 RX ADMIN — Medication 15 ML: at 08:53

## 2021-09-08 RX ADMIN — Medication 1 PACKET: at 08:55

## 2021-09-08 RX ADMIN — URSODIOL 300 MG: 300 CAPSULE ORAL at 20:23

## 2021-09-08 RX ADMIN — CIPROFLOXACIN 500 MG: 500 TABLET, FILM COATED ORAL at 16:21

## 2021-09-08 ASSESSMENT — ACTIVITIES OF DAILY LIVING (ADL)
ADLS_ACUITY_SCORE: 13

## 2021-09-08 ASSESSMENT — MIFFLIN-ST. JEOR: SCORE: 1175.09

## 2021-09-08 NOTE — PROGRESS NOTES
Therapy: ENTERAL TF   Insurance: UMR   Ded: $4000.00  Met: $4000.00    Co-Insurance:75/25  Max Out of Pocket: $6000.00  Met: $6000.00  Patient is covered 100% since ded and oop are met in full for 2021.    Enteral Formula is not covered since this is not her sole source of nutrtion. Cost for Osmolite 1.5 5 cans daily is $9.15    Please contact Intake with any questions, [phone] or In Basket pool, [facility] ([phone]).

## 2021-09-08 NOTE — PROGRESS NOTES
"[facility]    Hepatology Follow-up    CC: VTach arrest intraoperatively during liver transplant    Dx: PSC Cirrhosis              VTach arrest              Quiescent Ulcerative Colitis              GONZALO, resolved    24 hour events:   NAEO.    Subjective:  Ms. Nava is doing well this morning. Reports tolerating tube feeds. Denies nausea, vomiting. Reports abdominal pain this morning, but suspects it was related to gas pains. Bowel movement continue to be \"coffee ground\" in color and loose. Reports 1 BM since yesterday. Patient denies fevers, sweats or chills.    Medications  Current Facility-Administered Medications   Medication Dose Route Frequency     ciprofloxacin  500 mg Oral Q24H     [Held by provider] enoxaparin ANTICOAGULANT  40 mg Subcutaneous Q24H     furosemide  40 mg Oral Daily     heparin lock flush  5-20 mL Intracatheter Q24H     lactulose  20 g Oral BID     multivitamins w/minerals  15 mL Per Feeding Tube Daily     pantoprazole  40 mg Oral BID AC     protein modular  1 packet Per Feeding Tube BID     sodium chloride (PF)  10-40 mL Intracatheter Q8H     spironolactone  100 mg Oral Daily     ursodiol  300 mg Oral BID       Review of systems  A 10-point review of systems was negative.    Examination  /65 (BP Location: Left arm)   Pulse 75   Temp 98.8  F (37.1  C) (Oral)   Resp 18   Ht 1.651 m (5' 5\")   Wt 59.4 kg (131 lb)   SpO2 98%   BMI 21.80 kg/m      Intake/Output Summary (Last 24 hours) at 9/8/2021 1121  Last data filed at 9/8/2021 1000  Gross per 24 hour   Intake 1475 ml   Output --   Net 1475 ml       Gen- well, NAD, sitting in chair working on cross word puzzle   Eyes- sclera icterus   CVS- RRR  RS- CTA  Abd- soft, distended, non tender  Extr- no edema  Neuro- A+O x3, no asterixis   Skin- large stapled abdominal incision across upper abdomen, no drainage noted over incision  Psych- normal mood      Laboratory  Lab Results   Component Value Date     " 09/08/2021     06/25/2021    POTASSIUM 4.0 09/08/2021    POTASSIUM 4.4 06/25/2021    CHLORIDE 102 09/08/2021    CHLORIDE 100 06/25/2021    CO2 19 09/08/2021    CO2 21 06/25/2021    BUN 32 09/08/2021    BUN 23 06/25/2021    CR 0.81 09/08/2021    CR 0.69 06/25/2021       Lab Results   Component Value Date    BILITOTAL 5.1 09/08/2021    BILITOTAL 3.3 06/25/2021    ALT 24 09/08/2021    ALT 32 06/25/2021    AST 30 09/08/2021    AST 58 06/25/2021    ALKPHOS 216 09/08/2021    ALKPHOS 386 06/25/2021       Lab Results   Component Value Date    WBC 12.6 09/08/2021    WBC 15.2 06/25/2021    HGB 7.8 09/08/2021    HGB 10.7 06/25/2021    MCV 91 09/08/2021    MCV 92 06/25/2021     09/08/2021     06/25/2021       Lab Results   Component Value Date    INR 1.60 09/08/2021    INR 1.39 06/25/2021     MELD-Na score: 22 at 9/8/2021  7:23 AM  MELD score: 18 at 9/8/2021  7:23 AM  Calculated from:  Serum Creatinine: 0.81 mg/dL (Using min of 1 mg/dL) at 9/8/2021  7:23 AM  Serum Sodium: 132 mmol/L at 9/8/2021  7:23 AM  Total Bilirubin: 5.1 mg/dL at 9/8/2021  7:23 AM  INR(ratio): 1.60 at 9/8/2021  7:23 AM  Age: 56 years      Assessment  56 year old w/ history of HTN, quiescent ulcerative colitis, pancreatic mucinous cystadenoma, PSC cirrhosis who was admitted on 8/18/21for liver transplantation surgery. Intraoperatively experienced VTach arrest and surgery was aborted.  Post-operatively, pt initially required pressors, but is now clinically improved.  Followup up coronary cath and cardiac MRI unremarkable.  Cardiac siderosis less likely given non-elevated ferritin. Empiric antibiotics for leukocytosis of unexplained etiology, now completed 7 day course of ceftriaxone. Her GONZALO has resolved with albumin. She is now re-activated on liver transplant list.      Recommendations  #. Cirrhosis 2/2 PSC (Active on Transplant list)  #. Intra-operative VT (during attempt at liver transplantation surgery on 8/18/21)  She has been  "requiring ERCP every 4 weeks with stent placement as bridge to liver transplantation.  Patient underwent ERCP with removal of stone and sludge, right main hepatic duct was dilated and restented. Last ERCP 8/30/21   -- Monitor LFTs and INR daily  -- Lactulose PO, or Miralax                   -discharge w/Lactulose PRN at home  -- Continue Ursodiol  -- Ensure sodium restriction to 2000 mg per day  -- Continue feeding tubes   -- Adequate protein diet (1.2 - 1.5g/kg/day)                 - Recommend multiple meals during the day, Snacks and shakes during the day/night                 - Can use Ensure/Boost drinks TID  -- PT/OT evaluated with discharge recommendation to home   -- Repeat ERCP in 4 weeks if not transplanted before  -- DISCONTINUE daily statin once discharged        #. GONZALO, resolved  #. Acute on chronic anemia, likely dilutional (albumin)     #. Intra-abdominal ROMA drain - removed  Patient has been requiring weekly paracentesis, had ROMA drain after her reported transplant surgery. With h/o cirrhosis and ascites there was concern for increased output secondary as well as increased risk of developing SBP. With this removal, she might require frequent paracenteses for which we will continue to monitor.   -- Continue PTA diuretics (restarted 9/6)   -- Paracentesis today or tomorrow    - Plan for weekly outpatient paracenteses (patient will coordinate this)   -- Contiune SBP prophylaxis - Ciprofloxacin 500 mg daily     Patient staffed with attending physician, Dr. Leventhal Jacquelin Blomker MS4    \"I was present with the student who participated in the service and in the documentation of the note. I have verified the history and personally performed the physical exam and medical decisionmaking. I agree with the assessment and plan of care as documented in the note.\"     Allyssa Coreas MD, [facility]  Gastroenterology Fellow  Pager [phone]    "

## 2021-09-08 NOTE — PROGRESS NOTES
Care Management Follow Up    Length of Stay (days): 20    Expected Discharge Date: 09/09/2021     Concerns to be Addressed: discharge planning, home infusion   Patient plan of care discussed at interdisciplinary rounds: Yes    Anticipated Discharge Disposition: Home     Anticipated Discharge Services: Home Infusion  Anticipated Discharge DME: None    Patient/family educated on Medicare website which has current facility and service quality ratings: Yes  Education Provided on the Discharge Plan: Yes    Patient/Family in Agreement with the Plan: Yes    Referrals Placed by CM/SW: Home Infusion for home tube feeds/supplies  Private pay costs discussed: TBD    Additional Information:  This writer met with pt to discuss home tube feeds. Pt states she has not done home tube feeds in the past but states she is willing and able to learn. Benefit check was sent to [facility] yesterday, still awaiting coverage information. PLC arranged for 9/10 at 9am, requested pt be placed on cancellation list for an earlier appointment.   This writer discussed recommendation for home PT/OT, per previous [facility] 15 agencies were tried and unable to accept due to staffing or insurance. Pt states she would be agreeable to home therapy if an agency was available, but pt also feels she is progressing well and can exercise independently at home or attend outpatient therapy.     CC will continue to monitor patient's medical condition and progress towards discharge.  Asmita Escobar RN BSN  6C Unit Care Coordinator  Phone number: [phone]  Pager: [phone]    Addendum 9/8 at 1412:  This writer received update from intake at [facility] that pt coverage for tube feeding supplies/nursing at home, formula not covered as it is not sole-source of nutrition. OOP for formula is $9.15/day, liason discussed with pt who is agreeable. [facility] working on securing a home care agency. MD updated, will plan for discharge  tomorrow. Home Infusion orders placed.

## 2021-09-08 NOTE — PROGRESS NOTES
CLINICAL NUTRITION SERVICES - REASSESSMENT NOTE     Nutrition Prescription    RECOMMENDATIONS FOR MDs/PROVIDERS TO ORDER:  1. Adjust free water flushes via feeding tube as needed, pending fluid status. Currently, free water flushes are minimal, or for patency.    2. Monitor lytes (Phos, Mg++, and K+) for refeeding syndrome. If lytes trend low, aggressively replace.    3. Zinc supplementation (pt on lactulose).    Malnutrition Status:    Severe malnutrition in the context of chronic illness    Recommendations already ordered by Registered Dietitian (RD):  Cycled TFs, adjusted water flushes to before and after TF cycle    Future/Additional Recommendations:  1. Continue diet order as per team. Encourage small, frequent meals and intake of oral supplements.   2. Continue certavite, to help ensure micronutrient needs are met, and monitor hepatic labs.   3. Consider thiamine if warranted.   4. Check vitamin B12 lab.     EVALUATION OF THE PROGRESS TOWARD GOALS   Diet: 2 g Sodium (high protein). Has a prn snack/supplement order.   supplement order.  Intake: Fair appetite. Consumed 50% of meals on 9/3 and ~9/6. States her appetite has gotten a little better. Ordered a few items for breakfast (juice, cream of wheat, and a muffin). Aware of low-sodium diet and food options.               Kcal counts:              9/2         Total Kcals: 490       Total Protein: 24g. # Meals Ordered from Kitchen: 2 meals. # Meals Recorded: 1 meal (First - 100% 2 servings of cheerios, coffee). # Supplements Recorded: 100% 1 Ensure Enlive               9/3         # Meals Ordered from Kitchen: 2 meals. # Meals Recorded: No food intake recorded. # Supplements Recorded: No intake recorded. Note: Per Epic food record, pt consumed 50% at 11:30am, but not enough information was given to calculate calories/protein.              9/4         No data available in EMR              * Not meeting estimated needs of ~5322-0083 kcals and ~84 g protein daily.      Nutrition Support:    - Feeding Tube (FT) access: Rads placement on 9/7, NDT vs NJT (duodenojejunal junction). Skin around nasal bridle was inspected; appears appropriate, with no skin breakdown or tension on the bridle string. Previously had an NGT which was dislodged during EGD on 9/4. Using a feeding pump.   - TF orders: Osmolite 1.5 Chris at goal of 45ml/hr  (1080ml/day)  will provide: 1620 kcals (29 kcal/kg), 68 g PRO (1.2 g/kg), 823 ml free H20, 220 g CHO, and 0 g fiber daily. + 2 pkts ProSource per day. Total provisions: 1700 kcal (30 kcal/kg) and 90 g PRO (1.6 g/kg). TF provides 100% of nutrition needs.   - TF modulars: Prosource TF modular, as stated above.   - Feeding Tube Flushes: 30 mL Q 4 hrs  - Intake: Pt received a seven-day TF average of 177 mL/day. This provided 266+ kcals and 11+ g protein and does not meet pt's estimated needs noted below. Currently, TF infusing at 35 mL/hr in pt's room. Less than goal TF received due to loss of feeding tube access due to EGD.      NEW FINDINGS   GI: having zero to three stools daily. Stools are black or brown. Last stool on 9/7. Pt denies N/V. States she had a semi-formed stool recently. Denies abdominal pain but has ascites (paracentesis in the near future per pt).   Wt Hx: 74.6 kg (5/17/18), 72.6 kg (2/22/21), 68.4 kg (6/12/21), 62.6 kg (8/16/21), 59.5 kg (8/18, admit), 59.4 kg (9/8) - Pt has lost 13% of her body wt over the last three months. Wt may vary, pending fluid status and paracenteses, but suspect severe actual wt loss.     ASSESSED NUTRITION NEEDS-updated   Dosing Weight: 56 kg (based on lowest wt this admission on 8/31)  Estimated Energy Needs: 7107-3514 kcals/day (30 - 35 kcals/kg)  Justification: Increased needs   Estimated Protein Needs: 67-84+grams protein/day (1.2-1.5+ grams of pro/kg)  Justification: Increased needs with stress factors  Estimated Fluid Needs: 1 mL/kcal  Justification: Maintenance needs, although suspect may require the low  end of this range    MALNUTRITION  % Intake: < 75% for > 7 days (non-severe)  % Weight Loss: > 7.5% in 3 months (severe)  Subcutaneous Fat Loss: Facial region:  moderate  Muscle Loss: Temporal:  severe, Thoracic region (clavicle, acromium bone, deltoid, trapezius, pectoral):  severe, Upper arm (bicep, tricep):  moderate and Lower arm  (forearm):  Moderate   Fluid Accumulation/Edema: Trace to severe, ascites present.  Malnutrition Diagnosis: Severe malnutrition in the context of chronic illness    Previous Goals   Total avg nutritional intake to meet a minimum of 30 kcal/kg and 1.5 g PRO/kg daily (per dosing wt 55.7 kg).  Evaluation: Not meeting.     Previous Nutrition Diagnosis  Inadequate protein-energy intake related to suspect inconsistent po intakes d/t multiple interruptions to diet order and TF therapy was not initiates as evidenced by po intakes of < 50% over the past week and h/o weight loss of 38# (23.7%) over the past 6 months  Evaluation: Unresolved. Updated below.     CURRENT NUTRITION DIAGNOSIS  Inadequate protein-energy intake related to decreased appetite and less than goal TF infusion as evidenced by pt consumed 50% of meals twice and pt received a seven-day TF average of 177 mL/day which provided 266+ kcals and 11+ g protein and does not meet pt's estimated needs of 8815-1308 kcals/day (30 - 35 kcals/kg) and 67-84+grams protein/day (1.2-1.5+ grams of pro/kg).     INTERVENTIONS  Implementation  Collaboration with other providers, Enteral Nutrition: Discussed pt with team. Plan is continue to advance TFs and then cycle. New regimen, Osmolite 1.5, to be advanced to 70 mL/hr x 16 hrs (18:00-10:00; on cipro once daily at 16:00). TF regimen at goal provides 1120 mL TF, 1680+ kcals, 71+ g protein, 853 mL H2O, 228 g CHO, and 0 g fiber daily. Prosource TF modular (1 pkt twice daily) providing additional kcals/protein.   Feeding tube flush: Adjusted to patency free water flushes before and after TFs.    Medical food supplement therapy: Offered to schedule oral supplements. Pt declined the need as she will request these as desired. States she plans to order these more while in the hospital as she likes to consume these at home. Likes vanilla and chocolate-flavored oral supplements.  Nutrition education: Encouraged high protein options, including intake of oral supplements. Pt mentioned she is on a low-sodium diet. Discussed some options and provided Nutrition Care Manual handout on sodium amounts in foods/beverages (to provide ideas of recommended options).      Goals  Total average nutrition intake to meet 4507-6766 kcals/day (30 - 35 kcals/kg) and 67-84+grams protein/day (1.2-1.5+ grams of pro/kg)    Monitoring/Evaluation  Progress toward goals will be monitored and evaluated per protocol.     Nutrition will continue to follow.     Camille Wiggins, MS, RD, LD, [facility]   6C Pgr: [phone]

## 2021-09-08 NOTE — PROGRESS NOTES
Care Management Follow Up    Length of Stay (days): 20    Expected Discharge Date: 09/09/2021     Concerns to be Addressed: discharge planning     Patient plan of care discussed at interdisciplinary rounds: Yes    Anticipated Discharge Disposition: Home, Home Infusion     Anticipated Discharge Services: None  Anticipated Discharge DME: None    Patient/family educated on Medicare website which has current facility and service quality ratings: Yes  Education Provided on the Discharge Plan: Yes   Patient/Family in Agreement with the Plan: yes    Referrals Placed by CM/SW: Benefit check for home infusion for TF  Private pay costs discussed: TBD    Additional Information:  Per discussion with MD, PT/OT re-consulted today to determine TCU vs home recommendation. OT rec home with  OT, PT pending. Benefit check was sent to [facility] earlier today to check coverage for home tube feeds.    CC will continue to monitor patient's medical condition and progress towards discharge.  Asmita Escobar RN BSN  6C Unit Care Coordinator  Phone number: [phone]  Pager: [phone]

## 2021-09-08 NOTE — PROGRESS NOTES
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 She presented in stable condition from home for potential liver transplant. Patient was taken to the OR. Intraop, abdomen was opened, ascites was evacuated and falciform and left triangular ligament divided. At this time, patient developed vtach requiring 5-6 rounds of debrillation and the procedure was aborted. Consideration for starting ECMO, but patient converted out. Patient incisions closed. Taken to CT for concern for PE. CT showed no signs of PE. Initially admitted to SICU due to unstable hemodynamics on 3 pressors. Transferred to MICU 8/22 after coming of pressors. Transferred to floor 8/23.    Main Plans for Today:  -Continue TF per nutrition, transitioning to cycling feeds, outpatient services being set up  -Plan for discharge home once services set up     # Hepatic Cirrhosis Secondary to PSC/UC  Patient reevaluated for liver transplantation due to arrhythmias. Now relisted for transplant  - ROMA drain removed, stitch in place, will need transplant surgery follow up, scheduled 9/16 with Dr. Schmitt  - Hepatology consulted, appreciate recs  - Continue PTA lasix and aldactone   - Continue PTA ursodiol   - Lactulose 20 BID ordered, PRN senna and miralax  - Cipro for SBP PPx  - ERCP 8/30 with removal of sludge and stone as well as dilation and stenting. Will need to be preformed q4 weeks until transplanted  -Continue TF per nutrition, transitioning to cycling feeds, outpatient services being set up  -Plan for paracentesis tomorrow prior to discharge, then weekly as outpatient    #. Acute blood loss anemia concern for GI bleed  S/p transfusion: 3 units  intraoperative. Noted to have Hb drop to 6.3 9/3 AM. No evidence of acute bleed. Transfused 1u pRBCs with appropriate response. Morning of 9/4 hemoglobin dropped to 5.5 with bloody stools. Given 2 U pRBCs. EGD w/ varices w/o signs of acute bleeding.   - Transfuse yasmin maintain Hb > 7  - Continue PO PPI BID  - Appreciate GI recommendations      #. GONZALO (BL Creat 0.5-0.7)  Significant fluid output from ROMA with net negative fluid status. Likely prerenal GONZALO secondary to hypovolemia. Also consider postrenal related to compression from ascites.   - diuretics as above    #. Intraoperative V tach requiring 4 rounds of defibrillation   #. Distributive vs hypovolemic shock, resolved   Patient had wnl stress ECHO pre-op with EF 60-65%. Intraoperative v tach requiring defibrillation, came to SICU on 3 pressors. TTE post op 8/19 with global and regional left ventricular function is normal with an EF of 60-65%. Off pressors since 8/20.  - MAP goal > 55  - Discontinued Amiodarone per discussion with cards, monitored on tele for 24 hrs without events, will discontinue monitoring   - Cronary angiogram 8/25, without disease  - Cardiac MRI without remarkable findings     #. SIRS  Unclear if any infectious source. Initially thought to possibly be cholangitis but less likely per discussion with transplant hepatology and given ERCP without s/s. Greatest concern for an intraabdominal source. OR, blood, and sputum cultures with NGTD. EBV IgG positive but IgM not reactive. At this point low suspicion for infection.   - ID consulted  - CTM Cx NGTD, 8/30 Cx with GPC isolated only in broth (indicating likely contaminant)   - Discontinued CTX as now s/p 7 day empiric course    #. Post Operative Abdominal Pain  - Oxycodone 5 mg q4 PRN    #. Low Back Pain  -PRN Pako Roque    #. Post operative respiratory failure, resolved   - Extubated, on RA    #. Stress hyperglycemia, resolved  In setting of steroid administration, resolved, sliding scale insulin  discontinued    #. Deconditioning  - PT and OT consulted, home with home services    #. Severe Malnutrition in the Context of Acute Illness  -PRN ensure ordered  -Continue TF per nutrition, transitioning to cycling feeds, outpatient services being set up       Diet: Snacks/Supplements Adult: Other; whatever patient prefers; Between Meals  Combination Diet 2 gm NA Diet  Adult Formula Drip Feeding: Specified Time Osmolite 1.5; Nasojejunal; Goal Rate: 70 mL/hr x 16 hrs (6p-10a) - see advancement comment; mL/hr; From: 6:00 PM; 10:00 AM; Medication - Feeding Tube Flush Frequency: At least 15-30 mL water before and af...    DVT Prophylaxis: VTE Prophylaxis contraindicated due to concern for GI bleed  Jurado Catheter: Not present  Central Lines: PRESENT  CVC TRIPLE LUMEN Right Internal jugular-Site Assessment: WDL    Code Status: Full Code      Disposition Plan   Expected discharge: 09/09/2021   recommended to prior living arrangement once tube feeding set up.     The patient's care was discussed with the Attending Physician, Dr. Patel, Bedside Nurse and Patient.    Augustine Pena MD  Hospitalist Service, [address]  Securely message with the Vocera Web Console (learn more here)  Text page via [facility] Paging/Directory  Please see sign in/sign out for up to date coverage information    Clinically Significant Risk Factors Present on Admission                ______________________________________________________________________    Interval History   NAEO. Patient reports she feels well this AM. Denies acute symptoms this AM. Denies abdominal pain this AM or difficulties with PO intake.    4 Point ROS otherwise negative.     Data reviewed today: I reviewed all medications, new labs and imaging results over the last 24 hours. I personally reviewed no images or EKG's today.    Physical Exam   /70 (BP Location: Left arm)   Pulse 75   Temp 98.8  F (37.1  C) (Oral)    "Resp 18   Ht 1.651 m (5' 5\")   Wt 59.4 kg (131 lb)   SpO2 100%   BMI 21.80 kg/m    General: AAOx3, chronically ill appearing woman in NAD  Skin: large ecchymosis on right posterior arm, scattered bruising diffusely, mild jaundice   CV: RRR, normal S1S2, no murmur  Resp: CTAB, no wheeze, rhonchi   Abd: Soft, non-tender, mildly distended, BS+, abdominal staples in place, no oozing or bloody drainage, gauze covering previous drain site  Extremities: warm and well perfused, no edema    Data   Recent Labs   Lab 09/08/21  1110 09/08/21  0909 09/08/21  0723 09/07/21  0705 09/06/21  1706 09/06/21  0620 09/06/21  0620   WBC  --   --  12.6* 13.1* 13.3*  --  11.9*   HGB  --   --  7.8* 7.9* 7.8*  --  7.4*   MCV  --   --  91 94 93  --  89   PLT  --   --  179 169 159  --  147*   INR  --   --  1.60* 1.56*  --   --  1.58*   NA  --   --  132* 132*  --   --  134   POTASSIUM  --   --  4.0 3.8  --   --  4.2   CHLORIDE  --   --  102 103  --   --  107   CO2  --   --  19* 18*  --   --  18*   BUN  --   --  32* 44*  --   --  56*   CR  --   --  0.81 0.85  --   --  0.88   ANIONGAP  --   --  11 11  --   --  9   MARIELENA  --   --  9.0 9.3  --   --  9.3   * 100* 118* 113*  --    < > 86   ALBUMIN  --   --  3.2* 3.7  --   --  3.6   PROTTOTAL  --   --  6.1* 6.3*  --   --  6.0*   BILITOTAL  --   --  5.1* 5.9*  --   --  5.0*   ALKPHOS  --   --  216* 179*  --   --  154*   ALT  --   --  24 26  --   --  25   AST  --   --  30 28  --   --  29    < > = values in this interval not displayed.     "

## 2021-09-08 NOTE — PLAN OF CARE
D: admitted 8/18 from home for potential liver transplant. In OR patient developed vtach requiring 5-6 rounds of debrillation and the procedure was aborted. Initially admitted to SICU due to unstable hemodynamics on 3 pressors. Transferred to MICU 8/22 after coming of pressors. Transferred to floor 8/23.  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       I: Monitored vitals and assessed pt status. Encouraged activity.   Running: TFs @ 25mL/hr with FWF 30 mL Q4hr (goal rate 45 mL/hr - next increase due 8AM)     A: A&Ox4. VSS on RA. Tele shows SR, rate 60-70s. Afebrile. Denied pain. Urinating adequately (not saving). Up independently. BG Q4hr; no insulin orders. Abdominal incision WDL. Staples in place and BART. Appeared to rest comfortably between cares.      P: Continue to monitor pt status and report changes to Marashlee 4. [id number]  Isabel Persaud RN on 9/8/2021 at 6:24 AM

## 2021-09-09 ENCOUNTER — ANCILLARY PROCEDURE (OUTPATIENT)
Dept: ULTRASOUND IMAGING | Facility: CLINIC | Age: 56
End: 2021-09-09
Attending: STUDENT IN AN ORGANIZED HEALTH CARE EDUCATION/TRAINING PROGRAM
Payer: COMMERCIAL

## 2021-09-09 ENCOUNTER — HOME INFUSION (PRE-WILLOW HOME INFUSION) (OUTPATIENT)
Dept: PHARMACY | Facility: CLINIC | Age: 56
End: 2021-09-09

## 2021-09-09 VITALS
WEIGHT: 130.9 LBS | HEIGHT: 65 IN | HEART RATE: 82 BPM | OXYGEN SATURATION: 99 % | TEMPERATURE: 98 F | DIASTOLIC BLOOD PRESSURE: 78 MMHG | SYSTOLIC BLOOD PRESSURE: 121 MMHG | BODY MASS INDEX: 21.81 KG/M2 | RESPIRATION RATE: 16 BRPM

## 2021-09-09 LAB
ALBUMIN SERPL-MCNC: 3.2 G/DL (ref 3.4–5)
ALP SERPL-CCNC: 254 U/L (ref 40–150)
ALT SERPL W P-5'-P-CCNC: 27 U/L (ref 0–50)
ANION GAP SERPL CALCULATED.3IONS-SCNC: 10 MMOL/L (ref 3–14)
AST SERPL W P-5'-P-CCNC: 34 U/L (ref 0–45)
BILIRUB DIRECT SERPL-MCNC: 3.6 MG/DL (ref 0–0.2)
BILIRUB SERPL-MCNC: 4.6 MG/DL (ref 0.2–1.3)
BUN SERPL-MCNC: 31 MG/DL (ref 7–30)
CALCIUM SERPL-MCNC: 8.9 MG/DL (ref 8.5–10.1)
CHLORIDE BLD-SCNC: 102 MMOL/L (ref 94–109)
CO2 SERPL-SCNC: 20 MMOL/L (ref 20–32)
CREAT SERPL-MCNC: 0.71 MG/DL (ref 0.52–1.04)
ERYTHROCYTE [DISTWIDTH] IN BLOOD BY AUTOMATED COUNT: 18.7 % (ref 10–15)
GFR SERPL CREATININE-BSD FRML MDRD: >90 ML/MIN/1.73M2
GLUCOSE BLD-MCNC: 112 MG/DL (ref 70–99)
GLUCOSE BLDC GLUCOMTR-MCNC: 122 MG/DL (ref 70–99)
HCT VFR BLD AUTO: 24.8 % (ref 35–47)
HGB BLD-MCNC: 8.1 G/DL (ref 11.7–15.7)
INR PPP: 1.59 (ref 0.85–1.15)
MAGNESIUM SERPL-MCNC: 1.9 MG/DL (ref 1.6–2.3)
MCH RBC QN AUTO: 31 PG (ref 26.5–33)
MCHC RBC AUTO-ENTMCNC: 32.7 G/DL (ref 31.5–36.5)
MCV RBC AUTO: 95 FL (ref 78–100)
PHOSPHATE SERPL-MCNC: 2.8 MG/DL (ref 2.5–4.5)
PLATELET # BLD AUTO: 204 10E3/UL (ref 150–450)
POTASSIUM BLD-SCNC: 4.1 MMOL/L (ref 3.4–5.3)
PROT SERPL-MCNC: 6.3 G/DL (ref 6.8–8.8)
RBC # BLD AUTO: 2.61 10E6/UL (ref 3.8–5.2)
SODIUM SERPL-SCNC: 132 MMOL/L (ref 133–144)
WBC # BLD AUTO: 14.1 10E3/UL (ref 4–11)

## 2021-09-09 PROCEDURE — 36592 COLLECT BLOOD FROM PICC: CPT | Performed by: STUDENT IN AN ORGANIZED HEALTH CARE EDUCATION/TRAINING PROGRAM

## 2021-09-09 PROCEDURE — 83735 ASSAY OF MAGNESIUM: CPT | Performed by: STUDENT IN AN ORGANIZED HEALTH CARE EDUCATION/TRAINING PROGRAM

## 2021-09-09 PROCEDURE — 49083 ABD PARACENTESIS W/IMAGING: CPT | Mod: GC | Performed by: PEDIATRICS

## 2021-09-09 PROCEDURE — 85610 PROTHROMBIN TIME: CPT | Performed by: STUDENT IN AN ORGANIZED HEALTH CARE EDUCATION/TRAINING PROGRAM

## 2021-09-09 PROCEDURE — 84100 ASSAY OF PHOSPHORUS: CPT | Performed by: STUDENT IN AN ORGANIZED HEALTH CARE EDUCATION/TRAINING PROGRAM

## 2021-09-09 PROCEDURE — 80069 RENAL FUNCTION PANEL: CPT | Performed by: STUDENT IN AN ORGANIZED HEALTH CARE EDUCATION/TRAINING PROGRAM

## 2021-09-09 PROCEDURE — 85027 COMPLETE CBC AUTOMATED: CPT | Performed by: STUDENT IN AN ORGANIZED HEALTH CARE EDUCATION/TRAINING PROGRAM

## 2021-09-09 PROCEDURE — 250N000011 HC RX IP 250 OP 636: Performed by: STUDENT IN AN ORGANIZED HEALTH CARE EDUCATION/TRAINING PROGRAM

## 2021-09-09 PROCEDURE — 82248 BILIRUBIN DIRECT: CPT | Performed by: STUDENT IN AN ORGANIZED HEALTH CARE EDUCATION/TRAINING PROGRAM

## 2021-09-09 PROCEDURE — 250N000013 HC RX MED GY IP 250 OP 250 PS 637: Performed by: STUDENT IN AN ORGANIZED HEALTH CARE EDUCATION/TRAINING PROGRAM

## 2021-09-09 PROCEDURE — 87070 CULTURE OTHR SPECIMN AEROBIC: CPT | Performed by: PEDIATRICS

## 2021-09-09 PROCEDURE — 99233 SBSQ HOSP IP/OBS HIGH 50: CPT | Mod: GC | Performed by: INTERNAL MEDICINE

## 2021-09-09 PROCEDURE — 99239 HOSP IP/OBS DSCHRG MGMT >30: CPT | Mod: 25 | Performed by: INTERNAL MEDICINE

## 2021-09-09 PROCEDURE — 250N000013 HC RX MED GY IP 250 OP 250 PS 637: Performed by: INTERNAL MEDICINE

## 2021-09-09 RX ORDER — LACTULOSE 10 G/10G
20 SOLUTION ORAL DAILY PRN
Qty: 30 PACKET | Refills: 0 | Status: ON HOLD | OUTPATIENT
Start: 2021-09-09 | End: 2021-10-27

## 2021-09-09 RX ORDER — PANTOPRAZOLE SODIUM 40 MG/1
40 TABLET, DELAYED RELEASE ORAL
Qty: 60 TABLET | Refills: 0 | Status: SHIPPED | OUTPATIENT
Start: 2021-09-09 | End: 2021-10-09

## 2021-09-09 RX ORDER — POLYETHYLENE GLYCOL 3350 17 G/17G
17 POWDER, FOR SOLUTION ORAL DAILY
Qty: 510 G | Refills: 0 | Status: SHIPPED | OUTPATIENT
Start: 2021-09-09 | End: 2021-11-06

## 2021-09-09 RX ORDER — MAGNESIUM OXIDE 400 MG/1
400 TABLET ORAL 2 TIMES DAILY
Status: DISCONTINUED | OUTPATIENT
Start: 2021-09-09 | End: 2021-09-09 | Stop reason: HOSPADM

## 2021-09-09 RX ORDER — AMINO AC/PROTEIN HYDR/WHEY PRO 10G-100/30
1 LIQUID (ML) ORAL 2 TIMES DAILY
Qty: 60 PACKET | Refills: 0 | Status: SHIPPED | OUTPATIENT
Start: 2021-09-09 | End: 2021-10-25

## 2021-09-09 RX ORDER — CIPROFLOXACIN 500 MG/1
500 TABLET, FILM COATED ORAL EVERY 24 HOURS
Qty: 30 TABLET | Refills: 0 | Status: SHIPPED | OUTPATIENT
Start: 2021-09-09 | End: 2021-09-27

## 2021-09-09 RX ADMIN — SPIRONOLACTONE 100 MG: 50 TABLET ORAL at 09:08

## 2021-09-09 RX ADMIN — PANTOPRAZOLE SODIUM 40 MG: 40 TABLET, DELAYED RELEASE ORAL at 09:08

## 2021-09-09 RX ADMIN — URSODIOL 300 MG: 300 CAPSULE ORAL at 09:08

## 2021-09-09 RX ADMIN — Medication 15 ML: at 09:08

## 2021-09-09 RX ADMIN — Medication 400 MG: at 13:08

## 2021-09-09 RX ADMIN — Medication 1 PACKET: at 09:09

## 2021-09-09 RX ADMIN — LACTULOSE 20 G: 10 POWDER, FOR SOLUTION ORAL at 09:09

## 2021-09-09 RX ADMIN — FUROSEMIDE 40 MG: 40 TABLET ORAL at 09:07

## 2021-09-09 RX ADMIN — Medication 5 ML: at 06:53

## 2021-09-09 ASSESSMENT — ACTIVITIES OF DAILY LIVING (ADL)
ADLS_ACUITY_SCORE: 13

## 2021-09-09 ASSESSMENT — MIFFLIN-ST. JEOR: SCORE: 1174.64

## 2021-09-09 NOTE — PROGRESS NOTES
Care Management Follow Up    Length of Stay (days): 21    Expected Discharge Date: 09/09/2021     Concerns to be Addressed: discharge planning     Patient plan of care discussed at interdisciplinary rounds: Yes    Anticipated Discharge Disposition: Home     Anticipated Discharge Services: Home infusion  Anticipated Discharge DME: None    Patient/family educated on Medicare website which has current facility and service quality ratings:  Yes  Education Provided on the Discharge Plan:  Yes  Patient/Family in Agreement with the Plan: yes    Referrals Placed by CM/SW:  Home infusion  Private pay costs discussed: Not applicable    Additional Information:  This writer met patient at the bedside to discuss about discharge planning. Berta feel that she is safe at home. She has support at home from her spouse. She declines to have any home or outpatient therapy at this time.     [facility] will provide teaching for enteral feeds.     There are no other concerns at this time.     [facility]   [address] [place] [address]  (P) [phone]  (F) [phone]  Enteral feeds and supplies    ADRIANA Vogel RN Care Coordinator  Pager [phone] (unit RNCC pager)     To get in touch with the Weekend & Holiday on call RN Care Coordinator:  Pager:  [phone] OR Care Coordinator job code/pager [phone]    Care Management Discharge Note    Discharge Date: 09/09/2021       Discharge Disposition: Home, Home Care    Discharge Services: None    Discharge DME: None    Discharge Transportation: car, drives self, family or friend will provide    Private pay costs discussed: Not applicable    Education Provided on the Discharge Plan:  Yes   Persons Notified of Discharge Plans: Home infusion  Patient/Family in Agreement with the Plan: yes    Handoff Referral Completed: Yes    Additional Information:  None        ADRIANA Vogel RN Care Coordinator  Pager [phone] (unit RN[facility] pager)      To get in touch with the Weekend & Holiday on call RN Care Coordinator:  Pager:  [phone] OR Care Coordinator job code/pager [phone]

## 2021-09-09 NOTE — PLAN OF CARE
"/78 (BP Location: Left arm)   Pulse 82   Temp 98.7  F (37.1  C) (Oral)   Resp 16   Ht 1.651 m (5' 5\")   Wt 59.4 kg (130 lb 14.4 oz)   SpO2 98%   BMI 21.78 kg/m    VSS on RA. A/O x4.   TF running at goal of 70 mL/hr, flushed q4h per orders. Denied pain, SOB, and nausea. --141 overnight. Abd incision CDI with staples and BART. Skin and eyes mildly jaundiced. LBM 9/8. Voiding spontaneously. Up ad indy in room.  Plan- Paracentesis today prior to discharge. Continue POC.       "

## 2021-09-09 NOTE — DISCHARGE SUMMARY
[facility]  Discharge Summary - Medicine & Pediatrics       Date of Admission:  8/18/2021  Date of Discharge:  9/9/2021  Discharging Provider: Dr. 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 Acute blood loss anemia concern for GI bleed  #. GONZALO (BL Creat 0.5-0.7)  #. Intraoperative V tach requiring 4 rounds of defibrillation   #. Distributive vs hypovolemic shock, resolved   #. SIRS  #. Post Operative Abdominal Pain  #. Low Back Pain  #. Post operative respiratory failure, resolved   #. Stress hyperglycemia, resolved  #. Deconditioning  #. Severe Malnutrition in the Context of Acute Illness    Follow-ups Needed After Discharge   Follow-up Appointments     Follow Up and recommended labs and tests      Follow up with primary care provider, Tila Nevarez, within 7 days   for hospital follow- up.    Follow up with hepatology as scheduled    Follow up with transplant surgery as scheduled    You will be referred for cardiology follow-up     Maintain follow-up for weekly paracentesis and monthly ERCPs until   transplanted             Unresulted Labs Ordered in the Past 30 Days of this Admission     Date and Time Order Name Status Description    9/9/2021 12:25 PM Ascites Fluid Aerobic Bacterial Culture Routine In process     9/4/2021  8:00 AM Prepare red blood cells (unit) Preliminary     8/19/2021 11:17 AM Fungal or Yeast Culture Routine Preliminary     8/19/2021  5:41 AM Prepare red blood cells (unit) Preliminary     8/19/2021  5:41 AM Prepare red blood cells (unit) Preliminary     8/19/2021  5:41 AM Prepare red blood cells (unit) Preliminary       These results will be followed up by PCP    Discharge Disposition   Discharged to home  Condition at discharge: Stable      Hospital Course   Berta Nava was admitted on 8/18/2021 for intraoperative PEA arrest.  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 Patient was reevaluated for liver transplantation due to intraoperative arrhythmias. Now relisted for transplant following negative cardiac and infectious work-up.  - Transplant surgery follow up, scheduled 9/16 with Dr. Schmitt  - Patient will continue scheduled hepatology follow-up   - Continue PTA lasix and aldactone   - Continue PTA ursodiol   - PRN Lactulose prescribed with daily miralax  - Cipro for SBP PPx prescribed   - Discontinued PTA statin  - ERCP 8/30 with removal of sludge and stone as well as dilation and stenting. Will need to be preformed as outpatient q4 weeks until transplanted  - Patient will need weekly paracentesis as outpatient, paracentesis performed 9/9 with 1.7 L removed  - Home tube feeding as below for nutritional support     #. Acute blood loss anemia concern for GI bleed  First noted during initial operation. S/p transfusion: 3 units intraoperative. Noted to have Hb drop to 6.3 9/3 AM. No evidence of acute bleed. Transfused 1u pRBCs with appropriate response. Morning of 9/4 hemoglobin dropped to 5.5 with bloody stools. Given 2 U pRBCs. EGD w/ varices w/o signs of acute bleeding.   - Prescribed BID protonix PO, to be follow-up by GI team      #. GONZALO (BL Creat 0.5-0.7)  Likely prerenal GONZALO secondary to hypovolemia due to significant ROMA output. Resolved with reduced drain output and albumin.       #. Intraoperative V tach requiring 4 rounds of defibrillation   #. Distributive vs hypovolemic shock, resolved   Patient had wnl stress ECHO pre-op with EF 60-65%. Intraoperative v tach requiring defibrillation, came to SICU on 3 pressors. TTE post op 8/19 with global and regional left ventricular function is normal with an EF of 60-65%. Off pressors since 8/20. Coronary angiogram without CAD and unremarkable cardiac MRI. Treated with amiodarone initially prior to being transferred off  with stable telemetry after discontinuation.  - EP referral placed for follow-up      #. SIRS  Unclear if any infectious source. Initially thought to possibly be cholangitis but less likely per discussion with transplant hepatology and given ERCP without s/s. Greatest concern for an intraabdominal source. OR, blood, and sputum cultures with NGTD. EBV IgG positive but IgM not reactive. Empirically treated with 7 day CTX course.      #. Post Operative Abdominal Pain  Treated with PRN Oxycodone      #. Low Back Pain  Treated with PRN Pako Roque     #. Post operative respiratory failure, resolved   Following cardiac arrest. Extubated prior to transfer from ICU. On RA at discharge      #. Stress hyperglycemia, resolved  In setting of steroid administration while critically ill, resolved.     #. Deconditioning  Improved throughout admission. Patient discharged without PT/OT services.     #. Severe Malnutrition in the Context of Acute Illness  Patient s/p NJ placement and started on tube feeding. To be continued per home infusion    Consultations This Hospital Stay   SOCIAL WORK IP CONSULT  NUTRITION SERVICES ADULT IP CONSULT  PHYSICAL THERAPY ADULT IP CONSULT  OCCUPATIONAL THERAPY ADULT IP CONSULT  VASCULAR ACCESS CARE ADULT IP CONSULT  PHARMACY TO DOSE VANCO  CARE MANAGEMENT / SOCIAL WORK IP CONSULT  CARDIOLOGY GENERAL ADULT IP CONSULT  CARDIOLOGY ELECTROPHYSIOLOGY (EP) IP CONSULT  PHYSICAL THERAPY ADULT IP CONSULT  OCCUPATIONAL THERAPY ADULT IP CONSULT  PHARMACY TO DOSE VANCO  INFECTIOUS DISEASE GENERAL ADULT IP CONSULT  GI PANCREATICOBILIARY ADULT IP CONSULT  GI HEPATOLOGY ADULT IP CONSULT  NUTRITION SERVICES ADULT IP CONSULT  OCCUPATIONAL THERAPY ADULT IP CONSULT  PHYSICAL THERAPY ADULT IP CONSULT  PHARMACY IP CONSULT  SOT MEDICATION HISTORY IP PHARMACY CONSULT  PHARMACY IP CONSULT  CARDIOLOGY GENERAL ADULT IP CONSULT  CARE MANAGEMENT / SOCIAL WORK IP CONSULT  PHARMACY IP CONSULT  PHARMACY IP CONSULT  VASCULAR ACCESS  CARE ADULT IP CONSULT  ANESTHESIOLOGY IP CONSULT  NUTRITION SERVICES ADULT IP CONSULT  NEPHROLOGY GENERAL ADULT IP CONSULT  PHARMACY IP CONSULT  PHYSICAL THERAPY ADULT IP CONSULT  OCCUPATIONAL THERAPY ADULT IP CONSULT  INTERNAL MEDICINE PROCEDURE TEAM ADULT IP CONSULT [facility] - PARACENTESIS  SMOKING CESSATION PROGRAM IP CONSULT    Code Status   Full Code       The patient was discussed with MD Cassie Art 4 Service  [facility] [address]  [address]  [place] [address]  Phone: [phone]  ______________________________________________________________________    Physical Exam   Vital Signs: Temp: 98  F (36.7  C) Temp src: Oral BP: 121/78 Pulse: 82   Resp: 16 SpO2: 99 % O2 Device: None (Room air)    Weight: 130 lbs 14.4 oz  General: AAOx3, chronically ill appearing woman in NAD  Skin: large ecchymosis on right posterior arm, scattered bruising diffusely, mild jaundice   HEENT: NJ in place  CV: RRR, normal S1S2, no murmur  Resp: CTAB, no wheeze, rhonchi   Abd: Soft, non-tender, mildly distended, BS+, abdominal incision c/d/i, no oozing or bloody drainage  Extremities: warm and well perfused, no edema      Primary Care Physician   Tila Nevarez    Discharge Orders      Follow-Up with Cardiac Advanced Practice Provider      Home infusion referral      Cardiology Eval Adult Referral      Care Coordination Referral      MD face to face encounter    Documentation of Face to Face and Certification for Home Health Services    I certify that patient: Berta Nava is under my care and that I, or a nurse practitioner or physician's assistant working with me, had a face-to-face encounter that meets the physician face-to-face encounter requirements with this patient on: 8/25/2021.    This encounter with the patient was in whole, or in part, for the following medical condition, which is the primary reason for home health care: Patient came in for liver transplant which was  aborted due to induction complications, has ROMA and incision and needs home care for incision care and PT/OT due to deconditioning.    I certify that, based on my findings, the following services are medically necessary home health services: Nursing, Occupational Therapy and Physical Therapy.    My clinical findings support the need for the above services because: Nurse is needed: ., To assess vitals, and education after changes in medications or other medical regimen. and To provide caregiver training to assist with incision cleaning, watching for signs of infection.., Occupational Therapy Services are needed to assess and treat cognitive ability and address ADL safety due to impairment in cognitive status not at baseline. and Physical Therapy Services are needed to assess and treat the following functional impairments: safety getting around in the home, and preforming ADLs safely.    Further, I certify that my clinical findings support that this patient is homebound (i.e. absences from home require considerable and taxing effort and are for medical reasons or [employer] services or infrequently or of short duration when for other reasons) because: Requires assistance of another person or specialized equipment to access medical services because patient: Requires supervision of another for safe transfer...    Based on the above findings. I certify that this patient is confined to the home and needs intermittent skilled nursing care, physical therapy and/or speech therapy.  The patient is under my care, and I have initiated the establishment of the plan of care.  This patient will be followed by a physician who will periodically review the plan of care.  Physician/Provider to provide follow up care: No Ref-Primary, Physician    Attending hospital physician (the Medicare certified [facility] provider): Ruben Hay DO  Physician Signature: See electronic signature associated with these discharge orders.  Date: 8/25/2021      Reason for your hospital stay    You were admitted to the hospital following a intraoperative cardiac arrest. An evaluation for infection and cardiac disease was performed and did not reveal concerning findings. You were eventually able to be re-listed for transplant. Your course otherwise included respiratory failure following your arrest, an acute kidney injury related to your ROMA drain, and a GI bleed.     Activity    Your activity upon discharge: activity as tolerated     Follow Up and recommended labs and tests    Follow up with primary care provider, Tila Nevarez, within 7 days for hospital follow- up.    Follow up with hepatology as scheduled    Follow up with transplant surgery as scheduled    You will be referred for cardiology follow-up     Maintain follow-up for weekly paracentesis and monthly ERCPs until transplanted     Diet    Follow this diet upon discharge: Orders Placed This Encounter      Snacks/Supplements Adult: Other; Between Meals      Adult Formula Drip Feeding: Specified Time Osmolite 1.5; Nasojejunal; Goal Rate: 70 mL/hr x 16 hrs (6p-10a) - see advancement comment; mL/hr; From: 6:00 PM; 10:00 AM; Medication - Feeding Tube Flush Frequency: At least 15-30 mL water before and af...      Combination Diet 2 gm NA Diet       Significant Results and Procedures   Results for orders placed or performed during the hospital encounter of 08/18/21   XR Chest 2 Views    Narrative    EXAM: XR CHEST 2 VW  8/18/2021 10:16 PM     HISTORY:  pre op liver transplant       COMPARISON:  6/10/2021    FINDINGS:   PA and lateral radiograph the chest. Trachea is midline. Cardiac  silhouette is within normal limits. No airspace opacities. No pleural  effusion or pneumothorax. Osseous structures are within normal limits.  Multiple biliary stents project over the right upper quadrant.      Impression    IMPRESSION:   No acute cardiopulmonary findings.    I have personally reviewed the examination and initial  interpretation  and I agree with the findings.    ELIZABETH LAMB MD         SYSTEM ID:  [id number]   US Abdomen Limited w Abd/Pelvis Duplex Complete    Narrative    EXAMINATION: US ABDOMEN LIMITED WITH DOPPLER COMPLETE 8/19/2021 1:53  AM     COMPARISON: Abdomen and pelvis CT 6/10/2021, ultrasound 6/9/2021,  3/23/2021    HISTORY: Pre-op liver transplant patient, need to assess liver with  Doppler, concern for portal vein thrombosis    TECHNIQUE: The abdomen was scanned in standard fashion with  specialized ultrasound transducer(s) using both gray-scale, color  Doppler, and spectral flow techniques.    Findings:  Liver: Coarsened echotexture and nodular contour. Measures 12.1 cm.    Extrahepatic portal vein flow is retrograde at 9 cm/s.  Right portal vein flow is retrograde, measuring 10 cm/s.  Left portal vein flow is retrograde, measuring 16 cm/s.  Patent umbilical vein.    Flow in the hepatic artery is towards the liver and:  54 cm/s peak systolic  0.76 resistive index.     Splenic vein is retrograde. The left, middle, and right hepatic veins  are patent with flow towards the IVC. The IVC is patent with flow  towards the heart.   The visualized aorta is not dilated.    Gallbladder: Gallbladder sludge with significant wall thickening,  likely secondary to volume status.    Bile Ducts: Both the intra- and extrahepatic biliary system are of  normal caliber.  The common bile duct measures 3 mm.    Pancreas: Visualized portions of the head and body of the pancreas are  unremarkable.     Right kidney:  Right kidney with normal echotexture without  hydronephrosis or mass. Measures 12 cm.    Fluid: Small to moderate volume ascites      Impression    Impression:   1. Cirrhotic liver with evidence of portal hypertension with  retrograde flow in the portal veins and splenic vein, patent umbilical  vein, and small to moderate volume ascites. No evidence of portal vein  thrombosis.  2. Gallbladder sludge with wall thickening,  which is likely secondary  to cirrhosis/volume status.    I have personally reviewed the examination and initial interpretation  and I agree with the findings.    ELIZABETH LAMB MD         SYSTEM ID:  [id number]   XR Abdomen Port 1 View    Narrative    EXAMINATION:  XR ABDOMEN PORT 1 VIEWS 8/19/2021 1:15 PM     COMPARISON: none..    HISTORY: emergency case, had to close patient quickly.    TECHNIQUE: Frontal view of the abdomen.    FINDINGS: A portion of the right abdomen is excluded from the  field-of-view. There are three biliary catheters in place. A right  abdominal drain tip projects over the midline upper abdomen. The NG  tube tip and sidehole projected over the stomach. Partial  visualization of the esophageal ultrasound probe. No surgical sponges,  devices, or needles identified in the abdomen.  No abnormally dilated  loops of bowel.  No pneumatosis or portal venous gas.  The lung bases  are unremarkable.       Impression    IMPRESSION:   1. No retained surgical objects, although a portion of the right  abdomen is excluded from the field-of-view.  2. Abdominal drain, biliary catheters, and enteric tube in place.  3. Nonobstructive bowel gas pattern.    Barbara Constantino in OR 8 was contacted by Zafar Patino on 8/19/2021  1:24 PM and verbalized understanding of the result.    I have personally reviewed the examination and initial interpretation  and I agree with the findings.    VIDHYA TREADWELL MD         SYSTEM ID:  [id number]   XR Chest Port 1 View     Value    Radiologist flags swan amara catheter at level of the RVOT, consider (Urgent)    Narrative    EXAM: XR CHEST PORT 1 VIEW  8/19/2021 1:17 PM     HISTORY:  aborted liver tx, emergency close       COMPARISON:  Radiographs same day, 8/18/2021    TECHNIQUE: Single portable supine AP view the chest    FINDINGS:   Devices, lines, tubes: Right internal jugular [place]-Amara catheter with  tip projecting over the right ventricular outflow tract. Endoscope  with  tip projecting over the heart. Endotracheal tube with tip  obscured by the endoscope but approximately 3 cm superior to the  caryn. Right internal jugular central venous catheter with tip  projecting over the high superior vena cava. Enteric tube with  side-port and tip projecting over the stomach.    Low lung volumes. Increased perihilar streaky opacities. The trachea  is midline. The cardiomediastinal silhouette is within normal limits.  The pulmonary vasculature is distinct.  No appreciable pneumothorax or  pleural effusion. No acute osseous abnormality.  No definite  pneumoperitoneum.      Impression    IMPRESSION:   1. Endotracheal tube projects approximately 3 cm superior to the  caryn.   2. Right internal jugular [place]-Amara Amara catheter projecting over the  right ventricular outflow tract. Suggest slight advancement.  3. Right internal jugular central venous catheter with tip projecting  over the high superior vena cava.   4. Low lung volumes and streaky perihilar opacities, favoring  atelectasis.    [Access Center: swan amara catheter at level of the RVOT, consider  slight advancement.]    This report will be copied to the [facility] to ensure a  provider acknowledges the finding. Access Center is available Monday  through Friday 8am-3:30 pm.     I have personally reviewed the examination and initial interpretation  and I agree with the findings.    OLU MARTI MD         SYSTEM ID:  [id number]   CT Chest Pulmonary Embolism w Contrast     Value    Radiologist flags (Urgent)     Low-lying endotracheal tube, atelectasis/collapse of    Narrative    EXAMINATION: CT CHEST PULMONARY EMBOLISM W CONTRAST, 8/19/2021 2:03 PM    CLINICAL HISTORY: PE suspected, high prob    COMPARISON: Radiograph same day. CT of abdomen 6/10/2021.    TECHNIQUE: CTA imaging obtained through the chest with contrast.  Coronal and axial MIP reformatted images obtained.     CONTRAST: iopamidol (ISOVUE-370) solution 52  mL    FINDINGS:    Lines and tubes: Endotracheal tube abutting the caryn. Right internal  jugular central venous catheter with tip in the high superior vena  cava. Right internal jugular [place]-Amara catheter with tip terminating  in the pulmonary outflow tract. The visualized surgical drains  overlying the liver. Enteric tube coursing into the stomach with tip  out of the field of view.    Vessels: Filling defect adherent to the wall of the left lower lobe  superior segment pulmonary artery which represents contrast mixing  artifact versus chronic pulmonary embolism. Ascending thoracic aorta  and main pulmonary artery are within normal limits. The right common  carotid artery originates directly from the aorta. The left vertebral  artery originates directly from the aorta.    Mediastinum: Heart size is normal. No pericardial effusion. No  abnormal thoracic lymph nodes.     Lungs: Atelectasis involving the posterior aspects of the bilateral  upper and lower lobes. Tree-in-bud nodularity involving the left  upper, lower and middle lobes. Central tracheobronchial tree is  patent. No pleural effusion or pneumothorax. 4 mm solid pulmonary  nodule in the left upper lobe along the minor fissure (series 8 image  103) favoring an intrafissural lymph node. 5 mm subpleural pulmonary  nodule in the left upper lobe perivascular (series 8 image 70).    Bones and soft tissues: No suspicious bone lesion. Mild degenerative  changes of the thoracolumbar spine.    Upper abdomen: No acute abnormality in the visualized upper abdomen.  Cirrhotic configuration of the liver. Scattered foci of free  intra-abdominal air. Perihepatic ascites.      Impression    IMPRESSION:   1. No evidence of pulmonary embolism.  Contrast mixing artifact in the  left lower lobe superior segment pulmonary artery. No evidence of  right heart strain.  2. Endotracheal tube abutting the caryn.  3. Atelectasis/collapse of the posterior aspects of the  bilateral  upper and lower lobes.   4. Tree in bud nodularity involving the left upper, left lower, and  the left middle ribs. Possibilities include infectious and  inflammatory etiologies. Recommend follow-up until resolution.  5. 5 mm pulmonary nodule of the right upper lobe, if the patient is  considered high risk for lung cancer consider follow-up low-dose chest  CT in 12 months.    [Urgent Result: Low-lying endotracheal tube, atelectasis/collapse of  the posterior aspects of the bilateral upper and lower lobes.]    Finding was identified on 8/19/2021 2:07 PM.     Dr. Peterson was contacted by Dr. Nguyễn at 8/19/2021 2:18 PM and  verbalized understanding of the urgent finding.     I have personally reviewed the examination and initial interpretation  and I agree with the findings.    OLU MARTI MD         SYSTEM ID:  [id number]   XR Chest Port 1 View    Impression    RESIDENT PRELIMINARY INTERPRETATION  IMPRESSION:   1. Endotracheal tube now projecting 3.5 cm superior to the caryn.  2. Right internal jugular [place]-Amara catheter projecting over the right  ventricular outflow tract, consider advancement.  3. Perihilar opacities favoring atelectasis with improved aeration of  the lung bases.   XR Abdomen Port 1 View    Narrative    EXAM: XR ABDOMEN PORT 1 VIEWS  8/19/2021 3:05 PM      HISTORY: NG placement    COMPARISON: Abdominal radiograph 8/19/2022    FINDINGS: Single supine abdominal radiograph. Interval repositioning  of the nasogastric tube, tip and sidehole projecting over the stomach.  Three biliary stents in stable position projecting over the right  upper quadrant. Surgical drain projecting over the right upper  quadrant. Midline abdominal staples. Partially visualized [place]-Amara  catheter. Multiple borderline dilated loops of air-filled small bowel  measuring up to 2.8 cm, stable. No pneumatosis or portal venous gas.  Visualized portions of the lung demonstrate no focal airspace  opacities.        Impression    IMPRESSION:  1. Interval repositioning of the nasogastric tube, tip and sidehole  projecting over the stomach.  2. Multiple borderline dilated air-filled loops of small bowel,  nonspecific. Attention on follow-up.  3. Additional lines and tubes in position.    I have personally reviewed the examination and initial interpretation  and I agree with the findings.    ELIZABETH LAMB MD         SYSTEM ID:  [id number]   XR Chest Port 1 View    Narrative    EXAM: XR CHEST PORT 1 VIEW  8/20/2021 9:08 AM     HISTORY:  eval for PNA       COMPARISON:  Chest radiograph 8/19/2021    FINDINGS:   AP portable chest. Endotracheal tube is minimally retracted with  distal tip approximately 5.8 cm cephalad to the caryn. 2 right IJ  catheters are seen with distal tips projecting over the proximal and  distal SVC. Enteric tube is seen with sidehole projecting over the  left upper quadrant with distal tip projecting outside the  field-of-view. Multiple biliary stents are partially visualized over  the right upper quadrant.    Trachea is midline. Improved aeration of the bilateral lung fields  with decreased perihilar opacities. No new focal consolidative  opacities. No pleural effusion or pneumothorax. Stable  cardiomediastinal silhouette. Bones are grossly intact.      Impression    IMPRESSION:   1. Endotracheal tube now projects approximately 5.8 cm cephalad to the  caryn.  2. Decreased perihilar opacities, favoring atelectasis. No new focal  consolidative opacities.    I have personally reviewed the examination and initial interpretation  and I agree with the findings.    ASHLY AYERS MD         SYSTEM ID:  [id number]   US Abd Hepatic & Portal Vasculature Cmpl    Narrative    EXAMINATION: US ABD HEPATIC & PORTAL VASCULATURE  8/23/2021 3:37  PM     COMPARISON: Abdominal ultrasound with Doppler, 8/19/2021.    HISTORY: Rule out PVT      TECHNIQUE: The abdomen was scanned in standard fashion with  specialized  ultrasound transducer(s) using both gray-scale, color  Doppler, and spectral flow techniques.    Findings:  Only liver Doppler was performed. Gray-scale evaluation of the  abdominal organs was not done.  Extrahepatic portal vein flow is antegrade at 15 cm/s. The main portal  vein measures 0.9 cm.  Right portal vein flow is antegrade, measuring 16 cm/s.  Left portal vein flow is retrograde, measuring 14 cm.    Flow in the hepatic artery is towards the liver and:  59 cm/s peak systolic  0.82 resistive index.     The splenic vein is not visualized.  The left, middle, and right  hepatic veins are patent with flow towards the IVC. The IVC is patent  with flow towards the heart.      Fluid: Small ascites between the diaphragm and the right lobe  measuring 1.8 x 2.1 x 4.7 cm.      Impression    Impression:   1.  Cirrhotic liver with retrograde flow in the left portal vein  suggestive of portal hypertension. No evidence for portal venous  thrombosis.  2.  Mild ascites.    I have personally reviewed the examination and initial interpretation  and I agree with the findings.    MARTINEZ JAVED MD         SYSTEM ID:  [id number]   MRI Cardiac w/contrast    [facility]                                                     CMR Report      MRN:                  [id number]                                  Name:           ESTUARDO JOYCE                                  :                  1965                                  Scan Date:   2021 12:05:18                                  Electronically signed by Zane Rosario 2021-Aug-31 13:42:15    SUMMARY   ==========================================================================================================    Clinical history: 55-year old female with a history of end stage liver disease who experienced new-onset  sustained ventricular tachycardia during liver transplantation, which was consequently aborted.  Coronary  angiogram unremarkable on 08/25/2021. CMR to evaluate for the cause of the ventricular tachycardia.  Comparison CMR: None.    1. The LV is normal in cavity size and wall thickness. The global systolic function is hyperdynamic. The  LVEF is >70%. There are no regional wall motion abnormalities.    2. The RV is normal in cavity size. The global systolic function is hyperdynamic. The RVEF is >70%.     3. Both atria are normal in size.    4. There is no significant valvular disease.     5. Late gadolinium enhancement imaging shows no MI, fibrosis or infiltrative disease.     6. There is no pericardial effusion or thickening.    7. There is no intracardiac thrombus.    8. A central venous catheter is noted in the superior vena cava. It extends into the right atrium.    CONCLUSIONS: Normal cardiac function without evidence of fibrosis, aneurysm, or morphological features to  suggest the cause of ventricular tachycardia.     CORE EXAM   ==========================================================================================================    MEASUREMENTS   ----------------------------------------------------------------------------------------      VOLUMETRIC ANALYSIS       ----------------------------------------------  .---------------------------------------------------------.                   LV     Reference  RV     Reference   +-----+-----------+-------+-----------+-------+-----------+   EDV  ml         124.5   ()  134.7   ()         ml/m^2      75.6   (56-90)    81.8   (53-90)     ESV  ml          36.1   (22-59)    36.0   (15-68)          ml/m^2      21.9   (14-33)    21.9   (11-37)     CO   L/min       6.28              7.01                    L/min/m^2   3.81              4.25                    g/m^2                                            SV   ml          88.4   ()   98.7   ()         ml/m^2      53.7    (37-62)    59.9   (36-60)     EF   %           71.0   (59-77)    73.3   (55-79)    '-----+-----------+-------+-----------+-------+-----------'            CARDIAC OUTPUT HR:  71.0 BPM      LV DIMENSIONS       ----------------------------------------------          WALL THICKNESS - ANTEROSEPTAL:  0.4 cm          WALL THICKNESS - INFEROLATERAL:  0.3 cm          LV SRINATH:  4.7 cm          LV ESD:  2.3 cm        LA DIMENSIONS (LV SYSTOLE)       ----------------------------------------------          DIAMETER:  4.4 cm        AORTIC ROOT DIMENSIONS       ----------------------------------------------          SINUS OF VALSALVA:  3.0 cm          AORTIC ROOT SIZE:  Normal      ANATOMY   ----------------------------------------------------------------------------------------      LEFT VENTRICLE       ----------------------------------------------          WALL THICKNESS:  Normal          CAVITY SIZE:  Normal        RIGHT VENTRICLE       ----------------------------------------------          WALL THICKNESS:  Normal          CONTRACTILITY:  Normal          CAVITY SIZE:  Normal        LEFT ATRIUM       ----------------------------------------------          CAVITY SIZE:  Normal        RIGHT ATRIUM       ----------------------------------------------          CAVITY SIZE:  Normal        PERICARDIUM       ----------------------------------------------          Normal          EFFUSION:  None        PLEURAL EFFUSION       ----------------------------------------------   None       VALVES   ----------------------------------------------------------------------------------------      AORTIC VALVE       ----------------------------------------------          AORTIC VALVE LEAFLETS:  Trileaflet        MITRAL VALVE       ----------------------------------------------          MITRAL VALVE LEAFLETS:  Normal Leaflets        TRICUSPID VALVE       ----------------------------------------------          TRICUSPID VALVE LEAFLETS:   Normal Leaflets        PULMONIC VALVE       ----------------------------------------------          PULMONIC VALVE LEAFLETS:  Normal Leaflets      17 SEGMENT   ----------------------------------------------------------------------------------------  .------------------------------------------------------------------------------------------.   Segments            Wall Motion   Hyperenhancement  Stress Perfusion  Interpretation   +--------------------+--------------+------------------+------------------+----------------+   Base Anterior       Normal/Hyper  None                                Normal            Base Anteroseptal   Normal/Hyper  None                                Normal            Base Inferoseptal   Normal/Hyper  None                                Normal            Base Inferior       Normal/Hyper  None                                Normal            Base Inferolateral  Normal/Hyper  None                                Normal            Base Anterolateral  Normal/Hyper  None                                Normal            Mid Anterior        Normal/Hyper  None                                Normal            Mid Anteroseptal    Normal/Hyper  None                                Normal            Mid Inferoseptal    Normal/Hyper  None                                Normal            Mid Inferior        Normal/Hyper  None                                Normal            Mid Inferolateral   Normal/Hyper  None                                Normal            Mid Anterolateral   Normal/Hyper  None                                Normal            Apical Anterior     Normal/Hyper  None                                Normal            Apical Septal       Normal/Hyper  None                                Normal            Apical Inferior     Normal/Hyper  None                                Normal             Apical Lateral      Normal/Hyper  None                                Normal            [place]                Normal/Hyper  None                                Normal           +--------------------+--------------+------------------+------------------+----------------+   RV Segments         Wall Motion   Hyperenhancement  Stress Perfusion  Interpretation   +--------------------+--------------+------------------+------------------+----------------+   RV Basal Anterior   Normal/Hyper  None                                Normal            RV Basal Inferior   Normal/Hyper  None                                Normal            RV Mid              Normal/Hyper  None                                Normal            RV Apical           Normal/Hyper  None                                Normal           '--------------------+--------------+------------------+------------------+----------------'        FINDINGS       ----------------------------------------------          LV SCAR SIZE (17 SEGMENT):  0 %      SCAN INFO   ==========================================================================================================    GENERAL   ----------------------------------------------------------------------------------------      CONTRAST AGENT       ----------------------------------------------          TYPE:  Gadavist          GD CONCENTRATION:  0.5 M          VOLUME ADMINISTERED:  7.5 ml          DOSAGE:  0.06 mmol/kg          SERUM CREATININE:  1.23 mg/dL          CREATININE DATE:  2021-08-31 00:00:00        SEDATION       ----------------------------------------------          SEDATION USED?:  No        VITALS       ----------------------------------------------          HEIGHT:  65.00 in          HEIGHT:  165.10 cm          WEIGHT:  130.01 lbs          WEIGHT:  58.97 kgs          BSA:  1.65 m^2        SETUP       ----------------------------------------------          SCAN TYPE:   Clinical          PATIENT TYPE:  Outpatient          INCOMPLETE SCAN:  No          REASON(S) FOR SCAN:  Ventricular: non-EP           REFERRING PHYSICIAN:  DENAE HARKINS          ATTENDING PHYSICIAN:  DENAE HARKINS      Report generated by [employer], a product of [employer]   XR Surgery CARRIE Fluoro G/T 5 Min w Stills    Narrative    This exam was marked as non-reportable because it will not be read by a   radiologist or a [facility] non-radiologist provider.         XR Feeding Tube Placement    Narrative    FEEDING TUBE PLACEMENT 9/1/2021 11:32 AM    HISTORY: Feeding tube needed for nutrition preparation for possible  liver transplant.    COMPARISON: None    FINDINGS: 2.9 minutes of fluoroscopy were utilized for placement of a  feeding tube.  At the final position, the feeding tube tip was in the  duodenojejunal junction.  5 mL of barium contrast was injected to  confirm placement and the tube was flushed with saline.  The tube was  secured at the nose with placement of nasal bridle.  There were no  complications of the procedure.      Impression    IMPRESSION: Successful feeding tube placement.    I, MARTINEZ JAVED MD, attest that I was immediately available to  provide guidance and assistance during the entirety of the procedure.    I have personally reviewed the examination and initial interpretation  and I agree with the findings.    MARTINEZ JAVED MD         SYSTEM ID:  [id number]   US Renal Complete    Narrative    EXAMINATION: US RENAL COMPLETE, 9/1/2021 9:29 AM     COMPARISON: None.    HISTORY: GONZALO, significant ascites, c/f compression.    TECHNIQUE: The kidneys and bladder were scanned in the standard  fashion with specialized ultrasound transducer(s) using both gray  scale and limited color/spectral Doppler techniques. Study was limited  by diffuse shadowing secondary to bowel gas burden.    FINDINGS:    Right kidney: Measures 11.9 cm in length. Parenchyma is of normal  thickness  and increased echogenicity. No focal mass. No  hydronephrosis.    Left kidney: Measures 12.1 cm in length. Parenchyma is of normal  thickness and increased echogenicity. No focal mass. No  hydronephrosis.     Bladder: Not visualized.      Impression    IMPRESSION:  1.  Suboptimal visualization of the kidneys, smaller lesions may not  be visualized due to artifact.  2.  Increased bilateral renal cortical echogenicity suggestive of  medical renal disease.    I have personally reviewed the examination and initial interpretation  and I agree with the findings.    MARTINEZ JAVED MD         SYSTEM ID:  [id number]   XR Feeding Tube Placement    Narrative    FEEDING TUBE PLACEMENT 9/7/2021 2:13 PM    HISTORY: NJ dislodged and removed during EGD. Please replace    COMPARISON: None    FINDINGS: 1.4 minutes of fluoroscopy were utilized for placement of a  feeding tube.  At the final position, the feeding tube tip was at the  duodenojejunal junction.  15 mL of barium contrast was injected to  confirm placement. Feeding tube was flushed with water.  The tube was  secured at the level of the nose with placement of nasal bridle.   There were no complications of the procedure.      Impression    IMPRESSION: Successful feeding tube placement with tip in the  duodenojejunal junction.    I, MARTINEZ JAVED MD, attest that I was immediately available to  provide guidance and assistance during the entirety of the procedure.    I have personally reviewed the examination and initial interpretation  and I agree with the findings.    MARTINEZ JAVED MD         SYSTEM ID:  [id number]   POC US Guide for Paracentesis    Narrative    POCUS Findings:   Pre: approx 2cm pocket free flowing ascites in RLQ   Post: significantly diminished ascitic pocket    Echo Limited     Value    LVEF  60-65%    Narrative    [id number]  [id number]  [id number]  [id number]^[place]^MARIANNA^SOHAIL     [facility],[place]  Echocardiography  Laboratory  [address] [place] [address]     Name: ESTUARDO JOYCE  MRN: [id number]  : 1965  Study Date: 2021 02:27 PM  Age: 55 yrs  Gender: Female  Patient Location: [facility]  Reason For Study: Ventricular fibrillation  Ordering Physician: MARIANNA RANGEL  Referring Physician: DENAE HARKINS  Performed By: Anne Marie Joiner RDCS     BSA: 1.7 m2  Height: 65 in  Weight: 131 lb  HR: 92  BP: 92/51 mmHg  ______________________________________________________________________________  Procedure  Limited Portable Echo Adult.  ______________________________________________________________________________  Interpretation Summary  Global and regional left ventricular function is normal with an EF of 60-65%.  Global right ventricular function is normal.  No pericardial effusion is present.  There has been no change.  ______________________________________________________________________________  Left Ventricle  Global and regional left ventricular function is normal with an EF of 60-65%.     Right Ventricle  The right ventricle is normal size. Global right ventricular function is  normal.     Mitral Valve  The mitral valve is normal.     Tricuspid Valve  Mild to moderate tricuspid insufficiency is present. The right ventricular  systolic pressure is approximated at 36.7 mmHg plus the right atrial pressure.  Mild pulmonary hypertension is present.     Vessels  Unable to assess mean RA pressure given the patient is on a ventilator.     Pericardium  No pericardial effusion is present.     Compared to Previous Study  There has been no change.  ______________________________________________________________________________  MMode/2D Measurements & Calculations  TAPSE: 2.8 cm     Doppler Measurements & Calculations  TR max christiano: 302.8 cm/sec  TR max P.7 mmHg     ______________________________________________________________________________  Report approved by: Ho Goodman  08/19/2021 03:08 PM         Cardiac Catheterization    Narrative    Normal coronaries.       Discharge Medications   Current Discharge Medication List      START taking these medications    Details   ciprofloxacin (CIPRO) 500 MG tablet Take 1 tablet (500 mg) by mouth every 24 hours  Qty: 30 tablet, Refills: 0    Associated Diagnoses: Cirrhosis of liver with ascites, unspecified hepatic cirrhosis type (H)      lactulose (CEPHULAC) 10 GM packet Take 2 packets (20 g) by mouth daily as needed for constipation  Qty: 30 packet, Refills: 0    Associated Diagnoses: Cirrhosis of liver with ascites, unspecified hepatic cirrhosis type (H)      pantoprazole (PROTONIX) 40 MG EC tablet Take 1 tablet (40 mg) by mouth 2 times daily (before meals)  Qty: 60 tablet, Refills: 0    Associated Diagnoses: Gastrointestinal hemorrhage, unspecified gastrointestinal hemorrhage type      polyethylene glycol (MIRALAX) 17 GM/Dose powder Take 17 g by mouth daily  Qty: 510 g, Refills: 0    Associated Diagnoses: Constipation, unspecified constipation type      protein modular (PROSOURCE TF) LIQD 1 packet by Per Feeding Tube route 2 times daily  Qty: 60 packet, Refills: 0    Associated Diagnoses: Severe protein-calorie malnutrition (H)         CONTINUE these medications which have NOT CHANGED    Details   furosemide (LASIX) 20 MG tablet Take 2 tablets (40 mg) by mouth daily  Qty: 180 tablet, Refills: 3    Associated Diagnoses: PSC (primary sclerosing cholangitis); Cirrhosis of liver with ascites, unspecified hepatic cirrhosis type (H)      multivitamin (CENTRUM SILVER) tablet Take 1 tablet by mouth daily      spironolactone (ALDACTONE) 50 MG tablet Take 2 tablets (100 mg) by mouth daily  Qty: 180 tablet, Refills: 3    Associated Diagnoses: PSC (primary sclerosing cholangitis); Cirrhosis of liver with ascites, unspecified hepatic cirrhosis type (H)      ursodiol (ACTIGALL) 300 MG capsule Take 1 capsule (300 mg) by mouth 2 times daily  Qty: 180  capsule, Refills: 1    Associated Diagnoses: PSC (primary sclerosing cholangitis); Cirrhosis of liver with ascites, unspecified hepatic cirrhosis type (H)         STOP taking these medications       calcium carbonate 600 mg-vitamin D 400 units (CALTRATE) 600-400 MG-UNIT per tablet Comments:   Reason for Stopping:         mesalamine (LIALDA) 1.2 g EC tablet Comments:   Reason for Stopping:         potassium chloride ER (MICRO-K) 10 MEQ CR capsule Comments:   Reason for Stopping:         simvastatin (ZOCOR) 20 MG tablet Comments:   Reason for Stopping:             Allergies   Allergies   Allergen Reactions     Diagnostic X-Ray Materials Hives     PATIENT HAD HIVE REACTION AFTER ADMINISTERING CT CONTRAST DYE.       Contrast Dye

## 2021-09-09 NOTE — CONSULTS
Consult and Procedure Service - (CAPS)    Indication: Therapeutic paracentesis for symptomatic abdominal ascites  Requesting Service: Dr Augustine Pena  Bleeding Risk: Low-Moderate:, no anticoagulation on board, Platelets > 50 000  INR   Date Value Ref Range Status   09/09/2021 1.59 (H) 0.85 - 1.15 Final     Comment:     Effective 7/11/2021, the reference range for this assay has changed.   06/25/2021 1.39 (H) 0.86 - 1.14 Final      Platelet Count   Date Value Ref Range Status   09/09/2021 204 150 - 450 10e3/uL Final   06/25/2021 444 150 - 450 10e9/L Final       POCUS Findings:   Pre: approx 2cm pocket free flowing ascites in RLQ   Post: significantly diminished ascitic pocket   =========================    The risks and benefits of the procedure were explained to Berta who expressed understanding and opted to proceed.  Consent was obtained and placed in the chart.  A time out was performed and documentation placed in chart.  An area of ascites was located with ultrasound and marked in the RLQ quadrant; the area was prepped and draped in the usual sterile fashion.  Using a 20 g needle, 6 ml of 1% lidocaine was instilled from skin to peritoneum with with ultrasound guidance. Under real-time visualization, 5 Fr Yeuh catheter guided to fluid pocket and pigtail position within fluid pocket on the first attempt. 1700 ml of Straw-colored fluid was removed and sent for analysis per CAPS routine, and the area dressed.      Patient tolerated the procedure well. Please contact the Consult and Procedure Service if any complications or concerns arise.     Procedure supervised by the attending physician, Dr Jose Guadalupe Wisdom MD  PGY3 Internal Medicine   DOS:  9/9/2021

## 2021-09-09 NOTE — PROGRESS NOTES
Transplant Social Work Services Progress Note      Date of Initial Social Work Evaluation: 8/19/21  Collaborated with: Liver Transplant Team    Data: Berta is anticipated to discharge to home today.  Intervention: I met with Berta and provided supportive counseling.  Assessment: Berta remains hopeful about having a liver transplant.  She feels supported by her family, transplant team and kahlil.  Education provided by [facility]: virtual liver transplant support group  Plan: I will remain available for the psychosocial needs of this patient.          JOSETTE Curiel, [facility]  Liver Transplant   Phone [phone]  Pager [phone]

## 2021-09-09 NOTE — PROGRESS NOTES
[facility]     Received referral from Asmita Escobar [facility] for Enteral feedings.  Benefits verified.  Pt has coverage for enteral feedings with her UMR plan. They do require sole source of nutrition in order for formula to be covered and unfortunately she does not meet this criteria. Supplies and Nursing visits are covered 100% since her $4000 ded and $6000 oop have been met for 2021.  She will be self-pay for the formula only.   Cost for Osmolite 1.5 5 cans per day is $9.15.  Spoke with patient to review home infusion services, review benefits and offer choice of providers.  Patient would like to use [facility] for home infusion and is agreeable to self-pay cost.  Berta is expected to discharge today and will be going home on enteral feeds via pump.  She has never done home enteral therapy before and has not had initial teaching yet.  Met with patient at bedside and provided information about I services.  Explained about administration method via the Infinity pump with back pack for mobility.  Demonstrated to patient how to program the pump, set up and prime the t ubing, load pump and bag into the backpack, flush the feeding tube and initiate the continuous feed.   Had patient practice with teaching supplies and had her do a water flush to her feeding tube.  I reinforced water flushing for both patency and to maintain hydration.  Instructed in up to 12hr hang time for formula and to use a new bag q 24 hrs.  Informed patient about supplies and delivery of supplies, storage of formula, plan for SNV and 24/7 availability of I staff while on enteral therapy.       Berta verbalized understanding of all information given.  She is willing and able to learn and manage home enteral therapy.   She did struggle with some of the skills due to  strength, how short her feeding tube is, and having to pinch tube closed by hand.  Will send an extension with clamp and adapter to home for easier use.   Questions  answered.  Supplies and formula will be delivered to patient's home this evening.  Plan for [facility] RN to visit at home this evening to perform further teaching and assist with initial hookup.  Patient is ready for discharge from [facility] perspective.     Thank you for the referral.     ADRIANA Velez  [facility] Nurse Liaison   [email or web address]  Cell: [phone] M-F  [facility] Main: [phone]

## 2021-09-09 NOTE — PROGRESS NOTES
"[facility]    Hepatology Follow-up    CC: VTach arrest intraoperatively during liver transplant    Dx: PSC Cirrhosis              VTach arrest              Quiescent Ulcerative Colitis              GONZALO, resolved    24 hour events:   NAEO.    Subjective:  Ms. Nava is doing well this morning. Reports tolerating tube feeds at full rate. Denies nausea, vomiting. Has had 6 Bowel movements over last 24 hours which are a mix of brown and coffee ground appearance. Are more formed the loose. Patient denies fevers, sweats or chills.    Medications  Current Facility-Administered Medications   Medication Dose Route Frequency     ciprofloxacin  500 mg Oral Q24H     [Held by provider] enoxaparin ANTICOAGULANT  40 mg Subcutaneous Q24H     furosemide  40 mg Oral Daily     heparin lock flush  5-20 mL Intracatheter Q24H     lactulose  20 g Oral BID     magnesium oxide  400 mg Oral BID     multivitamins w/minerals  15 mL Per Feeding Tube Daily     pantoprazole  40 mg Oral BID AC     protein modular  1 packet Per Feeding Tube BID     sodium chloride (PF)  10-40 mL Intracatheter Q8H     spironolactone  100 mg Oral Daily     ursodiol  300 mg Oral BID       Review of systems  A 10-point review of systems was negative.    Examination  /78 (BP Location: Left arm)   Pulse 82   Temp 98  F (36.7  C) (Oral)   Resp 16   Ht 1.651 m (5' 5\")   Wt 59.4 kg (130 lb 14.4 oz)   SpO2 99%   BMI 21.78 kg/m      Intake/Output Summary (Last 24 hours) at 9/8/2021 1121  Last data filed at 9/8/2021 1000  Gross per 24 hour   Intake 1475 ml   Output --   Net 1475 ml       Gen- well, NAD, sitting at edge of bed  Eyes- sclera icterus   CVS- RRR  RS- CTA  Abd- soft, distended, non tender  Extr- no edema  Neuro- A+O x3, no asterixis   Skin- large stapled abdominal incision across upper abdomen, no drainage noted over incision  Psych- normal mood      Laboratory  Lab Results   Component Value Date     09/09/2021    NA " 128 06/25/2021    POTASSIUM 4.1 09/09/2021    POTASSIUM 4.4 06/25/2021    CHLORIDE 102 09/09/2021    CHLORIDE 100 06/25/2021    CO2 20 09/09/2021    CO2 21 06/25/2021    BUN 31 09/09/2021    BUN 23 06/25/2021    CR 0.71 09/09/2021    CR 0.69 06/25/2021       Lab Results   Component Value Date    BILITOTAL 4.6 09/09/2021    BILITOTAL 3.3 06/25/2021    ALT 27 09/09/2021    ALT 32 06/25/2021    AST 34 09/09/2021    AST 58 06/25/2021    ALKPHOS 254 09/09/2021    ALKPHOS 386 06/25/2021       Lab Results   Component Value Date    WBC 14.1 09/09/2021    WBC 15.2 06/25/2021    HGB 8.1 09/09/2021    HGB 10.7 06/25/2021    MCV 95 09/09/2021    MCV 92 06/25/2021     09/09/2021     06/25/2021       Lab Results   Component Value Date    INR 1.59 09/09/2021    INR 1.39 06/25/2021       MELD-Na score: 21 at 9/9/2021  6:57 AM  MELD score: 17 at 9/9/2021  6:57 AM  Calculated from:  Serum Creatinine: 0.71 mg/dL (Using min of 1 mg/dL) at 9/9/2021  6:57 AM  Serum Sodium: 132 mmol/L at 9/9/2021  6:57 AM  Total Bilirubin: 4.6 mg/dL at 9/9/2021  6:57 AM  INR(ratio): 1.59 at 9/9/2021  6:57 AM  Age: 56 years      Assessment  56 year old w/ history of HTN, quiescent ulcerative colitis, pancreatic mucinous cystadenoma, PSC cirrhosis who was admitted on 8/18/21for liver transplantation surgery. Intraoperatively experienced VTach arrest and surgery was aborted.  Post-operatively, pt initially required pressors, but is now clinically improved.  Followup up coronary cath and cardiac MRI unremarkable.  Cardiac siderosis less likely given non-elevated ferritin. Empiric antibiotics for leukocytosis of unexplained etiology, now completed 7 day course of ceftriaxone. Her GONZALO has resolved with albumin. She is now re-activated on liver transplant list.      Recommendations  #. Cirrhosis 2/2 PSC (Active on Transplant list)  #. Intra-operative VT (during attempt at liver transplantation surgery on 8/18/21)  She has been requiring ERCP every  "4 weeks with stent placement as bridge to liver transplantation.  Patient underwent ERCP with removal of stone and sludge, right main hepatic duct was dilated and restented. Last ERCP 8/30/21   -- Monitor LFTs and INR daily  -- Lactulose PO, or Miralax                   -discharge w/Lactulose PRN at home  -- Continue Ursodiol  -- Ensure sodium restriction to 2000 mg per day  -- Continue feeding tubes   -- Adequate protein diet (1.2 - 1.5g/kg/day)                 - Recommend multiple meals during the day, Snacks and shakes during the day/night                 - Can use Ensure/Boost drinks TID  -- PT/OT evaluated with discharge recommendation to home   -- Repeat ERCP in 4 weeks if not transplanted before  -- DISCONTINUE daily statin once discharged        #. GONZALO, resolved  #. Acute on chronic anemia, likely dilutional (albumin)     #. Intra-abdominal ROMA drain - removed  Patient has been requiring weekly paracentesis, had ROMA drain after her reported transplant surgery. With h/o cirrhosis and ascites there was concern for increased output secondary as well as increased risk of developing SBP. With this removal, she might require frequent paracenteses for which we will continue to monitor.   -- Continue PTA diuretics (restarted 9/6)   -- Paracentesis 9/9   - Plan for weekly outpatient paracenteses (patient will coordinate this)   -- Contiune SBP prophylaxis - Ciprofloxacin 500 mg daily     Patient staffed with attending physician, Dr. Ana María Huggins MS4    \"I was present with the student who participated in the service and in the documentation of the note. I have verified the history and personally performed the physical exam and medical decisionmaking. I agree with the assessment and plan of care as documented in the note.\"        Allyssa Coreas MD, [facility]  Gastroenterology Fellow  Pager [phone]  Attestation:  This patient has been seen and evaluated by me, Fabio Gotti.  Discussed with the " house staff team or resident(s) and agree with the findings and plan in this note.

## 2021-09-10 ENCOUNTER — PATIENT OUTREACH (OUTPATIENT)
Dept: NURSING | Facility: CLINIC | Age: 56
End: 2021-09-10
Payer: COMMERCIAL

## 2021-09-10 DIAGNOSIS — Z11.59 ENCOUNTER FOR SCREENING FOR OTHER VIRAL DISEASES: ICD-10-CM

## 2021-09-10 NOTE — PLAN OF CARE
DISCHARGE   Discharged to: Home  Via: Automobile  Accompanied by: Family  Discharge Instructions: diet, activity, medications, follow up appointments, when to call the MD, and what to watchout for (i.e. s/s of infection, increasing SOB, palpitations, chest pain)  Prescriptions: To be filled by [facility] discharge [facility] per pt's request; medication list reviewed & sent with pt  Follow Up Appointments: arranged; information given  Belongings: All sent with pt  IV: out  Telemetry: off  Pt exhibits understanding of above discharge instructions; all questions answered.    Basia Ott RN

## 2021-09-10 NOTE — PROGRESS NOTES
This is a recent snapshot of the patient's [facility] medical record.  For current drug dose and complete information and questions, call [phone] or In Basket pool, fv home infusion ([id number])  CSN Number:  [id number]

## 2021-09-10 NOTE — PROGRESS NOTES
Clinic Care Coordination Contact  [facility]: Post-Discharge Note  SITUATION                                                      Admission:    Admission Date: 08/18/21   Reason for Admission: intraoperative PEA arrest  Discharge:   Discharge Date: 09/09/21  Discharge Diagnosis: Hepatic Cirrhosis secondary to PSC/UC, Acute blood loss anemia.    BACKGROUND                                                      Berta Nava was admitted on 8/18/2021 for intraoperative PEA arrest.  The following problems were addressed during her hospitalization:     # Hepatic Cirrhosis Secondary to PSC/UC  Initially presented for liver transplantation. Patient was reevaluated for liver transplantation due to intraoperative arrhythmias. Now relisted for transplant following negative cardiac and infectious work-up.  - Transplant surgery follow up, scheduled 9/16 with Dr. Schmitt  - Patient will continue scheduled hepatology follow-up   - Continue PTA lasix and aldactone   - Continue PTA ursodiol   - PRN Lactulose prescribed with daily miralax  - Cipro for SBP PPx prescribed   - Discontinued PTA statin  - ERCP 8/30 with removal of sludge and stone as well as dilation and stenting. Will need to be preformed as outpatient q4 weeks until transplanted  - Patient will need weekly paracentesis as outpatient, paracentesis performed 9/9 with 1.7 L removed  - Home tube feeding as below for nutritional support     #. Acute blood loss anemia concern for GI bleed  First noted during initial operation. S/p transfusion: 3 units intraoperative. Noted to have Hb drop to 6.3 9/3 AM. No evidence of acute bleed. Transfused 1u pRBCs with appropriate response. Morning of 9/4 hemoglobin dropped to 5.5 with bloody stools. Given 2 U pRBCs. EGD w/ varices w/o signs of acute bleeding.   - Prescribed BID protonix PO, to be follow-up by GI team      #. GONZALO (BL Creat 0.5-0.7)  Likely prerenal GONZALO secondary to hypovolemia due to significant ROMA output.  Resolved with reduced drain output and albumin.       #. Intraoperative V tach requiring 4 rounds of defibrillation   #. Distributive vs hypovolemic shock, resolved   Patient had wnl stress ECHO pre-op with EF 60-65%. Intraoperative v tach requiring defibrillation, came to SICU on 3 pressors. TTE post op 8/19 with global and regional left ventricular function is normal with an EF of 60-65%. Off pressors since 8/20. Coronary angiogram without CAD and unremarkable cardiac MRI. Treated with amiodarone initially prior to being transferred off with stable telemetry after discontinuation.  - EP referral placed for follow-up      #. SIRS  Unclear if any infectious source. Initially thought to possibly be cholangitis but less likely per discussion with transplant hepatology and given ERCP without s/s. Greatest concern for an intraabdominal source. OR, blood, and sputum cultures with NGTD. EBV IgG positive but IgM not reactive. Empirically treated with 7 day CTX course.      #. Post Operative Abdominal Pain  Treated with PRN Oxycodone      #. Low Back Pain  Treated with PRN Pako Roque     #. Post operative respiratory failure, resolved   Following cardiac arrest. Extubated prior to transfer from ICU. On RA at discharge      #. Stress hyperglycemia, resolved  In setting of steroid administration while critically ill, resolved.     #. Deconditioning  Improved throughout admission. Patient discharged without PT/OT services.     #. Severe Malnutrition in the Context of Acute Illness  Patient s/p NJ placement and started on tube feeding. To be continued     ASSESSMENT      Enrollment  Primary Care Care Coordination Status: Declined    Discharge Assessment  How are you doing now that you are home?: A lot better  How are your symptoms? (Red Flag symptoms escalate to triage hotline per guidelines): Improved  Do you feel your condition is stable enough to be safe at home until your provider visit?: Yes  Does the patient have their  discharge instructions? : Yes  Does the patient have questions regarding their discharge instructions? : No  Were you started on any new medications or were there changes to any of your previous medications? : Yes  Does the patient have all of their medications?: Yes  Do you have questions regarding any of your medications? : No  Do you have all of your needed medical supplies or equipment (DME)?  (i.e. oxygen tank, CPAP, cane, etc.): Yes  Discharge follow-up appointment scheduled within 14 calendar days? : No  Is patient agreeable to assistance with scheduling? : No              Care Management   Community Health Worker Initial Outreach    CHW Initial Information Gathering:  Referral Source: IP Handoff  Preferred Hospital: [facility]  [phone]  Current living arrangement:: Not Assessed  Community Resources: Home Care  No PCP office visit in Past Year: No  CHW Additional Questions  If ED/Hospital discharge, follow-up appointment scheduled as recommended?: No  Patient agreeable to assistance with scheduling?: No  Medication changes made following ED/Hospital discharge?: Yes, patient to be scheduled with CC RN/SW within approx 1 business day  [facility]    Patient accepts CC: No, Enrolled in home care. Patient will be sent Care Coordination introduction letter for future reference.     Spoke to Berta MARTINEZ introduced self and intent of call regarding IP handoff.  Patient reports that she has been doing well and has no questions or concerns at this time.  CHW reviewed discharge instructions, which patient reports that scheduling her follow-ups are on the list to do today, patient was just waiting to hear back from transplant coordinator.  CHW also confirmed that Home care services through [facility] started yesterday.      PLAN                                                      Outpatient Plan:  N/A    Future Appointments   Date Time Provider Department Center    9/16/2021  3:00 PM [facility]   9/20/2021  8:15 AM [facility]   9/20/2021 11:10 AM Ernesto Schmitt MD [facility]   9/23/2021  3:00 PM [facility]   9/27/2021  8:00 AM UC LAB [facility]   9/27/2021  9:00 AM Leventhal, Thomas Michael, MD [facility]         For any urgent concerns, please contact our 24 hour nurse triage line: [phone] ([phone])       MICHELLE Nelson  Clinic Care Coordination  [facility] Clin[facility] : [place], [place], and [place]  Phone: [phone]

## 2021-09-14 ENCOUNTER — HOSPITAL ENCOUNTER (OUTPATIENT)
Dept: ULTRASOUND IMAGING | Facility: CLINIC | Age: 56
Discharge: HOME | End: 2021-09-14
Attending: INTERNAL MEDICINE | Admitting: INTERNAL MEDICINE
Payer: COMMERCIAL

## 2021-09-14 ENCOUNTER — HOME INFUSION (PRE-WILLOW HOME INFUSION) (OUTPATIENT)
Dept: PHARMACY | Facility: CLINIC | Age: 56
End: 2021-09-14

## 2021-09-14 VITALS — SYSTOLIC BLOOD PRESSURE: 105 MMHG | HEART RATE: 85 BPM | RESPIRATION RATE: 16 BRPM | DIASTOLIC BLOOD PRESSURE: 60 MMHG

## 2021-09-14 DIAGNOSIS — K74.60 CIRRHOSIS OF LIVER WITH ASCITES, UNSPECIFIED HEPATIC CIRRHOSIS TYPE (H): ICD-10-CM

## 2021-09-14 DIAGNOSIS — R18.8 CIRRHOSIS OF LIVER WITH ASCITES, UNSPECIFIED HEPATIC CIRRHOSIS TYPE (H): ICD-10-CM

## 2021-09-14 LAB — BACTERIA FLD CULT: NO GROWTH

## 2021-09-14 PROCEDURE — P9047 ALBUMIN (HUMAN), 25%, 50ML: HCPCS | Performed by: RADIOLOGY

## 2021-09-14 PROCEDURE — 250N000011 HC RX IP 250 OP 636: Performed by: RADIOLOGY

## 2021-09-14 PROCEDURE — 250N000009 HC RX 250: Performed by: RADIOLOGY

## 2021-09-14 PROCEDURE — 49083 ABD PARACENTESIS W/IMAGING: CPT

## 2021-09-14 RX ORDER — ALBUMIN (HUMAN) 12.5 G/50ML
SOLUTION INTRAVENOUS
Status: DISCONTINUED
Start: 2021-09-14 | End: 2021-09-15 | Stop reason: HOSPADM

## 2021-09-14 RX ORDER — ALBUMIN (HUMAN) 12.5 G/50ML
25 SOLUTION INTRAVENOUS ONCE
Status: COMPLETED | OUTPATIENT
Start: 2021-09-14 | End: 2021-09-14

## 2021-09-14 RX ORDER — LIDOCAINE HYDROCHLORIDE 10 MG/ML
10 INJECTION, SOLUTION EPIDURAL; INFILTRATION; INTRACAUDAL; PERINEURAL ONCE
Status: COMPLETED | OUTPATIENT
Start: 2021-09-14 | End: 2021-09-14

## 2021-09-14 RX ADMIN — LIDOCAINE HYDROCHLORIDE 10 ML: 10 INJECTION, SOLUTION EPIDURAL; INFILTRATION; INTRACAUDAL; PERINEURAL at 15:01

## 2021-09-14 RX ADMIN — ALBUMIN HUMAN 25 G: 0.25 SOLUTION INTRAVENOUS at 14:59

## 2021-09-14 NOTE — PROGRESS NOTES
Pt was in Radiology today for a paracentesis. Procedure performed by Jermaine Procedure was tolerated well, vitals remained stable.5000 cc's of clear yellow colored fluid removed. 25 G Albumin instilled per protocol. Written and verbal instructions were reviewed here is no evidence of bleeding upon discharge.  Pt left department in stable satisfactory condition with sister.

## 2021-09-15 ENCOUNTER — PATIENT OUTREACH (OUTPATIENT)
Dept: GASTROENTEROLOGY | Facility: CLINIC | Age: 56
End: 2021-09-15

## 2021-09-15 NOTE — PROGRESS NOTES
This is a recent snapshot of the patient's [facility] medical record.  For current drug dose and complete information and questions, call [phone] or In Basket pool, fv home infusion ([id number])  CSN Number:  [id number]

## 2021-09-15 NOTE — PROGRESS NOTES
Called Berta to discuss upcoming procedure scheduled for Oct 1st    Patient Class: Outpatient, Location: [facility] OR  Panel 1: Gregory Gabriel MD - Gastroenterology  ENDOSCOPIC RETROGRADE CHOLANGIOPANCREATOGRAPHY, Laterality: N/A, Anesthesia Type: General    In person appt with Dr Leventhal on Monday 9/27. Will ask if this can also be her pre op exam.    COVID test scheduled on 9/27 with labs at 8am already scheduled    Pt verbalized understanding. No further questions    Jeanette Brennan, RN, BSN,   [facility]  Care coordinator   [phone]  Fax [phone]

## 2021-09-16 ENCOUNTER — OFFICE VISIT (OUTPATIENT)
Dept: INTERNAL MEDICINE | Facility: CLINIC | Age: 56
End: 2021-09-16
Payer: COMMERCIAL

## 2021-09-16 VITALS
HEART RATE: 110 BPM | BODY MASS INDEX: 22.14 KG/M2 | TEMPERATURE: 99 F | SYSTOLIC BLOOD PRESSURE: 90 MMHG | OXYGEN SATURATION: 98 % | HEIGHT: 65 IN | WEIGHT: 132.9 LBS | RESPIRATION RATE: 16 BRPM | DIASTOLIC BLOOD PRESSURE: 62 MMHG

## 2021-09-16 DIAGNOSIS — K83.01 PRIMARY SCLEROSING CHOLANGITIS (H): Primary | ICD-10-CM

## 2021-09-16 LAB
BACTERIA PRT CULT: NO GROWTH
ERYTHROCYTE [DISTWIDTH] IN BLOOD BY AUTOMATED COUNT: 18.9 % (ref 10–15)
HCT VFR BLD AUTO: 26.5 % (ref 35–47)
HGB BLD-MCNC: 8.8 G/DL (ref 11.7–15.7)
MCH RBC QN AUTO: 31 PG (ref 26.5–33)
MCHC RBC AUTO-ENTMCNC: 33.2 G/DL (ref 31.5–36.5)
MCV RBC AUTO: 93 FL (ref 78–100)
PLATELET # BLD AUTO: 387 10E3/UL (ref 150–450)
RBC # BLD AUTO: 2.84 10E6/UL (ref 3.8–5.2)
WBC # BLD AUTO: 13 10E3/UL (ref 4–11)

## 2021-09-16 PROCEDURE — 85027 COMPLETE CBC AUTOMATED: CPT | Performed by: NURSE PRACTITIONER

## 2021-09-16 PROCEDURE — U0005 INFEC AGEN DETEC AMPLI PROBE: HCPCS | Performed by: NURSE PRACTITIONER

## 2021-09-16 PROCEDURE — 83735 ASSAY OF MAGNESIUM: CPT | Performed by: NURSE PRACTITIONER

## 2021-09-16 PROCEDURE — 36415 COLL VENOUS BLD VENIPUNCTURE: CPT | Performed by: NURSE PRACTITIONER

## 2021-09-16 PROCEDURE — 99213 OFFICE O/P EST LOW 20 MIN: CPT | Performed by: NURSE PRACTITIONER

## 2021-09-16 PROCEDURE — U0003 INFECTIOUS AGENT DETECTION BY NUCLEIC ACID (DNA OR RNA); SEVERE ACUTE RESPIRATORY SYNDROME CORONAVIRUS 2 (SARS-COV-2) (CORONAVIRUS DISEASE [COVID-19]), AMPLIFIED PROBE TECHNIQUE, MAKING USE OF HIGH THROUGHPUT TECHNOLOGIES AS DESCRIBED BY CMS-2020-01-R: HCPCS | Performed by: NURSE PRACTITIONER

## 2021-09-16 PROCEDURE — 80053 COMPREHEN METABOLIC PANEL: CPT | Performed by: NURSE PRACTITIONER

## 2021-09-16 ASSESSMENT — MIFFLIN-ST. JEOR: SCORE: 1183.71

## 2021-09-16 NOTE — NURSING NOTE
"08/18/21-09/09/21 [facility] cardiac arrest.  Vital signs:  Temp: 99  F (37.2  C) Temp src: Tympanic BP: 90/62 Pulse: 110   Resp: 16 SpO2: 98 %     Height: 165.1 cm (5' 5\") Weight: 60.3 kg (132 lb 14.4 oz)  Estimated body mass index is 22.12 kg/m  as calculated from the following:    Height as of this encounter: 1.651 m (5' 5\").    Weight as of this encounter: 60.3 kg (132 lb 14.4 oz).          "

## 2021-09-16 NOTE — PROGRESS NOTES
"    Assessment & Plan     Primary sclerosing cholangitis    - CBC with platelets; Future  - Comprehensive metabolic panel (BMP + Alb, Alk Phos, ALT, AST, Total. Bili, TP); Future  - Magnesium; Future  - Asymptomatic COVID-19 Virus (Coronavirus) by PCR; Future    F/u transplant team, hepatologist as planned             Tila Nevarez NP  [facility] [place] is a 56 year old who presents for the following health issues     HPI       Hospital Follow-up Visit:    Hospital/Nursing Home/IP Rehab Facility: [facility]  Date of Admission: 08/18/21  Date of Discharge: 09/09/21  Reason(s) for Admission: cardiac arrest      Was your hospitalization related to COVID-19? No   Problems taking medications regularly:  None  Medication changes since discharge: None  Problems adhering to non-medication therapy:  None    Summary of hospitalization:  [facility] discharge summary reviewed  Diagnostic Tests/Treatments reviewed.  Follow up needed: transplant team  Other Healthcare Providers Involved in Patient s Care:         Specialist appointment - 1 week  Update since discharge: stable. Post Discharge Medication Reconciliation: discharge medications reconciled, continue medications without change.  Plan of care communicated with patient            Review of Systems   CONSTITUTIONAL:POSITIVE  for weight gain  ENT/MOUTH: POSITIVE for nasal mucous and feeding tube  RESP: NEGATIVE for significant cough or SOB  CV: NEGATIVE for chest pain, palpitations or peripheral edema      Objective    BP 90/62   Pulse 110   Temp 99  F (37.2  C) (Tympanic)   Resp 16   Ht 1.651 m (5' 5\")   Wt 60.3 kg (132 lb 14.4 oz)   LMP  (LMP Unknown)   SpO2 98%   BMI 22.12 kg/m    Body mass index is 22.12 kg/m .  Physical Exam   GENERAL: alert and fatigued  EYES: conjunctiva/corneas- juandice   RESP: lungs clear to auscultation - no rales, rhonchi or " wheezes  CV: regular rate and rhythm, normal S1 S2, no S3 or S4, no murmur, click or rub, no peripheral edema and peripheral pulses strong  PSYCH: mentation appears normal and affect normal/bright

## 2021-09-16 NOTE — LETTER
September 21, 2021      Bertaalfredo Nava  [address] [facility] [address]        Dear ,    We are writing to inform you of your test results.    Your recent lab results are stable.       Resulted Orders   Asymptomatic COVID-19 Virus (Coronavirus) by PCR Nose   Result Value Ref Range    SARS CoV2 PCR Negative Negative      Comment:      NEGATIVE: SARS-CoV-2 (COVID-19) RNA not detected, presumed negative.    Narrative    Testing was performed using the sal SARS-CoV-2 assay on the sal  [email or web address] System. This test should be ordered for the detection of  SARS-CoV-2 in individuals who meet SARS-CoV-2 clinical and/or  epidemiological criteria. Test performance is unknown in asymptomatic  patients. This test is for in vitro diagnostic use under the FDA EUA  for laboratories certified under CLIA to perform high and/or moderate  complexity testing. This test has not been FDA cleared or approved. A  negative result does not rule out the presence of PCR inhibitors in  the specimen or target RNA in concentration below the limit of  detection for the assay. The possibility of a false negative should  be considered if the patient's recent exposure or clinical  presentation suggests COVID-19. This test was validated by the [facility]. This  laboratory is certified under the Clinical Laboratory Improvement  Amendments of 1988 (CLIA-88) as qualified to perform high and/or  moderate complexity laboratory testing.   CBC with platelets   Result Value Ref Range    WBC Count 13.0 (H) 4.0 - 11.0 10e3/uL    RBC Count 2.84 (L) 3.80 - 5.20 10e6/uL    Hemoglobin 8.8 (L) 11.7 - 15.7 g/dL    Hematocrit 26.5 (L) 35.0 - 47.0 %    MCV 93 78 - 100 fL    MCH 31.0 26.5 - 33.0 pg    MCHC 33.2 31.5 - 36.5 g/dL    RDW 18.9 (H) 10.0 - 15.0 %    Platelet Count 387 150 - 450 10e3/uL   Comprehensive metabolic panel (BMP + Alb, Alk Phos, ALT, AST, Total. Bili, TP)   Result Value Ref Range     Sodium 129 (L) 133 - 144 mmol/L    Potassium 4.8 3.4 - 5.3 mmol/L    Chloride 95 94 - 109 mmol/L    Carbon Dioxide (CO2) 24 20 - 32 mmol/L    Anion Gap 10 3 - 14 mmol/L    Urea Nitrogen 45 (H) 7 - 30 mg/dL    Creatinine 0.85 0.52 - 1.04 mg/dL    Calcium 9.4 8.5 - 10.1 mg/dL    Glucose 98 70 - 99 mg/dL    Alkaline Phosphatase 296 (H) 40 - 150 U/L    AST 62 (H) 0 - 45 U/L    ALT 38 0 - 50 U/L    Protein Total 7.2 6.8 - 8.8 g/dL    Albumin 3.5 3.4 - 5.0 g/dL    Bilirubin Total 4.3 (H) 0.2 - 1.3 mg/dL    GFR Estimate 76 >60 mL/min/1.73m2      Comment:      As of July 11, 2021, eGFR is calculated by the CKD-EPI creatinine equation, without race adjustment. eGFR can be influenced by muscle mass, exercise, and diet. The reported eGFR is an estimation only and is only applicable if the renal function is stable.   Magnesium   Result Value Ref Range    Magnesium 2.1 1.6 - 2.3 mg/dL       If you have any questions or concerns, please call the clinic at the number listed above.       Sincerely,      Tila Nevarez NP

## 2021-09-16 NOTE — Clinical Note
Please abstract the following data from this visit with this patient into the appropriate field in Epic:    Tests that can be patient reported without a hard copy:    Pap smear done on this date: 08/13/20 (approximately), by this group: [facility], results were wnl.     Other Tests found in the patient's chart through Chart Review/Care Everywhere:    {Abstract Quality List (Optional):039627}    Note to Abstraction: If this section is blank, no results were found via Chart Review/Care Everywhere.

## 2021-09-17 LAB
ALBUMIN SERPL-MCNC: 3.5 G/DL (ref 3.4–5)
ALP SERPL-CCNC: 296 U/L (ref 40–150)
ALT SERPL W P-5'-P-CCNC: 38 U/L (ref 0–50)
ANION GAP SERPL CALCULATED.3IONS-SCNC: 10 MMOL/L (ref 3–14)
AST SERPL W P-5'-P-CCNC: 62 U/L (ref 0–45)
BILIRUB SERPL-MCNC: 4.3 MG/DL (ref 0.2–1.3)
BUN SERPL-MCNC: 45 MG/DL (ref 7–30)
CALCIUM SERPL-MCNC: 9.4 MG/DL (ref 8.5–10.1)
CHLORIDE BLD-SCNC: 95 MMOL/L (ref 94–109)
CO2 SERPL-SCNC: 24 MMOL/L (ref 20–32)
CREAT SERPL-MCNC: 0.85 MG/DL (ref 0.52–1.04)
GFR SERPL CREATININE-BSD FRML MDRD: 76 ML/MIN/1.73M2
GLUCOSE BLD-MCNC: 98 MG/DL (ref 70–99)
MAGNESIUM SERPL-MCNC: 2.1 MG/DL (ref 1.6–2.3)
POTASSIUM BLD-SCNC: 4.8 MMOL/L (ref 3.4–5.3)
PROT SERPL-MCNC: 7.2 G/DL (ref 6.8–8.8)
SARS-COV-2 RNA RESP QL NAA+PROBE: NEGATIVE
SODIUM SERPL-SCNC: 129 MMOL/L (ref 133–144)

## 2021-09-20 ENCOUNTER — OFFICE VISIT (OUTPATIENT)
Dept: TRANSPLANT | Facility: CLINIC | Age: 56
End: 2021-09-20
Attending: INTERNAL MEDICINE
Payer: COMMERCIAL

## 2021-09-20 VITALS
OXYGEN SATURATION: 99 % | HEART RATE: 103 BPM | BODY MASS INDEX: 23.26 KG/M2 | DIASTOLIC BLOOD PRESSURE: 77 MMHG | SYSTOLIC BLOOD PRESSURE: 115 MMHG | WEIGHT: 139.8 LBS

## 2021-09-20 DIAGNOSIS — K74.60 CIRRHOSIS OF LIVER WITH ASCITES, UNSPECIFIED HEPATIC CIRRHOSIS TYPE (H): Primary | ICD-10-CM

## 2021-09-20 DIAGNOSIS — R18.8 CIRRHOSIS OF LIVER WITH ASCITES, UNSPECIFIED HEPATIC CIRRHOSIS TYPE (H): Primary | ICD-10-CM

## 2021-09-20 PROCEDURE — 99212 OFFICE O/P EST SF 10 MIN: CPT | Performed by: TRANSPLANT SURGERY

## 2021-09-20 NOTE — LETTER
"    9/20/2021         RE: Berta Nava  [address] [facility] [address]        Dear Colleague,    Thank you for referring your patient, Berta Nava, to the [facility]. Please see a copy of my visit note below.    Patient was taken to the operating room for a possible transplant on 25 June 2021.  She developed sustained ventricular tachycardia and had to be shocked 5 times.  The transplant was aborted.  She has nicely recovered from the episode.  She had a full cardiology evaluation including a left heart cath and MRI  She has increasing ascites.    Vital signs:      BP: 115/77 Pulse: 103     SpO2: 99 %       Weight: 63.4 kg (139 lb 12.8 oz)  Estimated body mass index is 23.26 kg/m  as calculated from the following:    Height as of 9/16/21: 1.651 m (5' 5\").    Weight as of this encounter: 63.4 kg (139 lb 12.8 oz).      Abdomen soft, wound healed well, recommend remove staples    Patient is acitve on the transplant list with a MELD of 24  All quesitions answered        Again, thank you for allowing me to participate in the care of your patient.        Sincerely,        Ernesto Schmitt MD    "

## 2021-09-20 NOTE — PROGRESS NOTES
"Patient was taken to the operating room for a possible transplant on 25 June 2021.  She developed sustained ventricular tachycardia and had to be shocked 5 times.  The transplant was aborted.  She has nicely recovered from the episode.  She had a full cardiology evaluation including a left heart cath and MRI  She has increasing ascites.    Vital signs:      BP: 115/77 Pulse: 103     SpO2: 99 %       Weight: 63.4 kg (139 lb 12.8 oz)  Estimated body mass index is 23.26 kg/m  as calculated from the following:    Height as of 9/16/21: 1.651 m (5' 5\").    Weight as of this encounter: 63.4 kg (139 lb 12.8 oz).      Abdomen soft, wound healed well, recommend remove staples    Patient is acitve on the transplant list with a MELD of 24  All quesitions answered    "

## 2021-09-21 ENCOUNTER — HOSPITAL ENCOUNTER (OUTPATIENT)
Dept: ULTRASOUND IMAGING | Facility: CLINIC | Age: 56
Discharge: HOME | End: 2021-09-21
Attending: INTERNAL MEDICINE | Admitting: INTERNAL MEDICINE
Payer: COMMERCIAL

## 2021-09-21 VITALS — RESPIRATION RATE: 14 BRPM | DIASTOLIC BLOOD PRESSURE: 63 MMHG | HEART RATE: 78 BPM | SYSTOLIC BLOOD PRESSURE: 101 MMHG

## 2021-09-21 DIAGNOSIS — R18.8 CIRRHOSIS OF LIVER WITH ASCITES, UNSPECIFIED HEPATIC CIRRHOSIS TYPE (H): ICD-10-CM

## 2021-09-21 DIAGNOSIS — K74.60 CIRRHOSIS OF LIVER WITH ASCITES, UNSPECIFIED HEPATIC CIRRHOSIS TYPE (H): ICD-10-CM

## 2021-09-21 PROCEDURE — P9047 ALBUMIN (HUMAN), 25%, 50ML: HCPCS

## 2021-09-21 PROCEDURE — 250N000009 HC RX 250: Performed by: INTERNAL MEDICINE

## 2021-09-21 PROCEDURE — 250N000011 HC RX IP 250 OP 636

## 2021-09-21 PROCEDURE — 49083 ABD PARACENTESIS W/IMAGING: CPT

## 2021-09-21 PROCEDURE — 272N000706 US PARACENTESIS

## 2021-09-21 RX ORDER — ALBUMIN (HUMAN) 12.5 G/50ML
SOLUTION INTRAVENOUS
Status: COMPLETED
Start: 2021-09-21 | End: 2021-09-21

## 2021-09-21 RX ORDER — ALBUMIN (HUMAN) 12.5 G/50ML
12.5 SOLUTION INTRAVENOUS ONCE
Status: COMPLETED | OUTPATIENT
Start: 2021-09-21 | End: 2021-09-21

## 2021-09-21 RX ADMIN — LIDOCAINE HYDROCHLORIDE 10 ML: 10 INJECTION, SOLUTION EPIDURAL; INFILTRATION; INTRACAUDAL; PERINEURAL at 09:23

## 2021-09-21 RX ADMIN — ALBUMIN HUMAN 12.5 G: 0.25 SOLUTION INTRAVENOUS at 10:03

## 2021-09-21 RX ADMIN — ALBUMIN (HUMAN) 12.5 G: 12.5 SOLUTION INTRAVENOUS at 10:03

## 2021-09-21 NOTE — PROGRESS NOTES
Patient tolerated Paracentesis well.  4800 mL of fabiola colored fluid drained done by Dr. Nguyen  Dressing bandaid in place with no drainage.  12.5g albumin given. Patient states understanding discharge instructions, taking P.O and home per self with significant other.

## 2021-09-22 NOTE — BRIEF OP NOTE
[facility] Brief Operative Note    Pre-operative diagnosis: Primary sclerosing cholangitis [K83.01]   Post-operative diagnosis As prior    Procedure: Procedure(s):  ENDOSCOPIC RETROGRADE CHOLANGIOPANCREATOGRAPHY   Surgeon(s): Surgeon(s) and Role:     * Gregory Gabriel MD - Primary   Estimated blood loss: 100 mL    Specimens: * No specimens in log *   Findings:          56 year-old female with past medical history of hypertension, microscopic otitis, ulcerative colitis, pancreatic mucinous cystadenoma, PSC with cirrhosis who presented for liver transplantation last month but went into V. tach and surgery was aborted. Patient was requiring pressor support and was transferred to ICU and then medical floor. ERCP 8/30 following stabilization showed clearance of L intrahepatic ducts and a mildly stenosed right anterior duct with upstream saccular dilation and stones. Clearance of intra and extrahepatic stones and sludges but some remain in the right intrahepatics. A 10 Fr stent was placed into the right anterior duct to ensure drainage as a bridge to transplant. She now returns for repeat ERCP      - One partially occluded stent from the biliary tree were seen in the major papilla. Extracted.   - Prior biliary sphincterotomy appeared open.   - Filling defects consistent with stones and sludge was seen on the cholangiogram mainly in the left lobe and common duct.                        - Biliary tree was swept several times clearing pus, multiple small stones and sludge.   - Two 4 Fr x 11 cm Castanon transpapillary plastic stens were placed into the main hepatic duct.   - Large esophageal varices, banded x 4 with complete eradication. There was no bleeding at the end of the procedure.       ** Overall impression is 56 year-old female with PSC cirrhosis who needed repeat ERCPs for further stone clearance and stents exchange as a bridge to liver transplant. ERCP today showed new stones into the L intrahepatic and common  ducts. Clearance of intra and extrahepatic stones, sludges and pus was complete with no residual stones lefts. Decision was made to place self-ejected 4 Fr Castanon stents into the biliary tree and then to allow a stent free trial given complete clearance of ducts.     Plan  - Observe patient in PACU for possible discharge same day.   - Avoid aspirin and nonsteroidal anti-inflammatory medicines for 3 days.   - Full liquid diet today. Soft diet tomorrow and then advance as tolerated.   - Carafate 1 g QID, PO PPI BID for 8 weeks   - Observe patient's clinical course and repeat ERCP in 6 weeks with Dr. Gabriel assuming patient has not been transplanted by then. Earlier if signs of recurrent cholangitis or rising LFTs.   - Will also repeat EGD at that time for variceal surveillance.   - The findings and recommendations were discussed with the patient and their family.

## 2021-09-27 ENCOUNTER — LAB (OUTPATIENT)
Dept: LAB | Facility: CLINIC | Age: 56
End: 2021-09-27
Payer: COMMERCIAL

## 2021-09-27 ENCOUNTER — OFFICE VISIT (OUTPATIENT)
Dept: GASTROENTEROLOGY | Facility: CLINIC | Age: 56
DRG: 420 | End: 2021-09-27
Attending: INTERNAL MEDICINE
Payer: COMMERCIAL

## 2021-09-27 ENCOUNTER — LAB (OUTPATIENT)
Dept: LAB | Facility: CLINIC | Age: 56
End: 2021-09-27
Attending: INTERNAL MEDICINE

## 2021-09-27 VITALS
WEIGHT: 140.7 LBS | RESPIRATION RATE: 18 BRPM | BODY MASS INDEX: 23.44 KG/M2 | HEIGHT: 65 IN | OXYGEN SATURATION: 100 % | DIASTOLIC BLOOD PRESSURE: 70 MMHG | TEMPERATURE: 97.9 F | SYSTOLIC BLOOD PRESSURE: 103 MMHG | HEART RATE: 90 BPM

## 2021-09-27 DIAGNOSIS — Z12.9 SCREENING FOR CANCER: ICD-10-CM

## 2021-09-27 DIAGNOSIS — Z11.59 ENCOUNTER FOR SCREENING FOR OTHER VIRAL DISEASES: ICD-10-CM

## 2021-09-27 DIAGNOSIS — K74.60 CIRRHOSIS OF LIVER (H): ICD-10-CM

## 2021-09-27 DIAGNOSIS — R18.8 CIRRHOSIS OF LIVER WITH ASCITES, UNSPECIFIED HEPATIC CIRRHOSIS TYPE (H): ICD-10-CM

## 2021-09-27 DIAGNOSIS — I85.10 SECONDARY ESOPHAGEAL VARICES WITHOUT BLEEDING (H): Primary | ICD-10-CM

## 2021-09-27 DIAGNOSIS — E43 SEVERE PROTEIN-CALORIE MALNUTRITION (H): ICD-10-CM

## 2021-09-27 DIAGNOSIS — K51.019 ULCERATIVE PANCOLITIS WITH COMPLICATION (H): ICD-10-CM

## 2021-09-27 DIAGNOSIS — K74.60 CIRRHOSIS OF LIVER WITH ASCITES, UNSPECIFIED HEPATIC CIRRHOSIS TYPE (H): ICD-10-CM

## 2021-09-27 DIAGNOSIS — Z76.82 PATIENT ON WAITING LIST FOR LIVER TRANSPLANT: ICD-10-CM

## 2021-09-27 DIAGNOSIS — K83.01 PSC (PRIMARY SCLEROSING CHOLANGITIS) (H): ICD-10-CM

## 2021-09-27 LAB
ALBUMIN SERPL-MCNC: 2.5 G/DL (ref 3.4–5)
BILIRUB SERPL-MCNC: 5 MG/DL (ref 0.2–1.3)
CREAT SERPL-MCNC: 0.77 MG/DL (ref 0.52–1.04)
GFR SERPL CREATININE-BSD FRML MDRD: 85 ML/MIN/1.73M2
INR PPP: 1.28 (ref 0.85–1.15)
SARS-COV-2 RNA RESP QL NAA+PROBE: NEGATIVE
SODIUM SERPL-SCNC: 129 MMOL/L (ref 133–144)

## 2021-09-27 PROCEDURE — 36415 COLL VENOUS BLD VENIPUNCTURE: CPT | Performed by: PATHOLOGY

## 2021-09-27 PROCEDURE — 84295 ASSAY OF SERUM SODIUM: CPT | Performed by: PATHOLOGY

## 2021-09-27 PROCEDURE — 85610 PROTHROMBIN TIME: CPT | Performed by: PATHOLOGY

## 2021-09-27 PROCEDURE — 99215 OFFICE O/P EST HI 40 MIN: CPT | Performed by: INTERNAL MEDICINE

## 2021-09-27 PROCEDURE — U0003 INFECTIOUS AGENT DETECTION BY NUCLEIC ACID (DNA OR RNA); SEVERE ACUTE RESPIRATORY SYNDROME CORONAVIRUS 2 (SARS-COV-2) (CORONAVIRUS DISEASE [COVID-19]), AMPLIFIED PROBE TECHNIQUE, MAKING USE OF HIGH THROUGHPUT TECHNOLOGIES AS DESCRIBED BY CMS-2020-01-R: HCPCS | Performed by: INTERNAL MEDICINE

## 2021-09-27 PROCEDURE — 82247 BILIRUBIN TOTAL: CPT | Performed by: PATHOLOGY

## 2021-09-27 PROCEDURE — 82565 ASSAY OF CREATININE: CPT | Performed by: PATHOLOGY

## 2021-09-27 PROCEDURE — 82040 ASSAY OF SERUM ALBUMIN: CPT | Performed by: PATHOLOGY

## 2021-09-27 RX ORDER — CIPROFLOXACIN 500 MG/1
500 TABLET, FILM COATED ORAL EVERY 24 HOURS
Qty: 30 TABLET | Refills: 11 | Status: SHIPPED | OUTPATIENT
Start: 2021-09-27 | End: 2021-10-29

## 2021-09-27 RX ORDER — SPIRONOLACTONE 50 MG/1
100 TABLET, FILM COATED ORAL DAILY
Qty: 180 TABLET | Refills: 3 | Status: SHIPPED | OUTPATIENT
Start: 2021-09-27 | End: 2021-11-15

## 2021-09-27 RX ORDER — FUROSEMIDE 20 MG
40 TABLET ORAL DAILY
Qty: 180 TABLET | Refills: 3 | Status: SHIPPED | OUTPATIENT
Start: 2021-09-27 | End: 2021-11-15

## 2021-09-27 ASSESSMENT — ENCOUNTER SYMPTOMS
ORTHOPNEA: 0
POOR WOUND HEALING: 0
ABDOMINAL PAIN: 0
LEG PAIN: 0
BLOOD IN STOOL: 0
EXERCISE INTOLERANCE: 0
HYPOTENSION: 0
LIGHT-HEADEDNESS: 0
SYNCOPE: 0
SLEEP DISTURBANCES DUE TO BREATHING: 0
DIARRHEA: 1
NAIL CHANGES: 0
PALPITATIONS: 0
JAUNDICE: 1
HEARTBURN: 0
BOWEL INCONTINENCE: 0
CONSTIPATION: 1
NAUSEA: 0
VOMITING: 0
SKIN CHANGES: 0
HYPERTENSION: 0
BLOATING: 0
RECTAL PAIN: 0

## 2021-09-27 ASSESSMENT — MIFFLIN-ST. JEOR: SCORE: 1219.09

## 2021-09-27 ASSESSMENT — PAIN SCALES - GENERAL: PAINLEVEL: NO PAIN (0)

## 2021-09-27 NOTE — NURSING NOTE
"Chief Complaint   Patient presents with     RECHECK     Cirrhosis, would like to discuss getting feeding tube removed     Vital signs:  Temp: 97.9  F (36.6  C) Temp src: Pulmonary Artery BP: 103/70 Pulse: 90   Resp: 18 SpO2: 100 %     Height: 165.1 cm (5' 5\") Weight: 63.8 kg (140 lb 11.2 oz)  Estimated body mass index is 23.41 kg/m  as calculated from the following:    Height as of this encounter: 1.651 m (5' 5\").    Weight as of this encounter: 63.8 kg (140 lb 11.2 oz).        Reina Mcduffie, [facility]  9/27/2021 8:32 AM      "

## 2021-09-27 NOTE — PATIENT INSTRUCTIONS
We will increase your diuretics     - furosemide 40mg in the morning and 20mg in the early afternoon     - spironolactone 100mg in the morning and 50mg in the early afternoon    I have reordered the Ciprofloxacin    Cirrhosis Education  Nutrition  - For patients with cirrhosis, it is very important to eat the right types and amounts of foods.  We recommend a diet low in carbohydrates/sugars and high in fresh fruits/vegetables, with the right amount of protein.  We typically recommend 1.5gm/kg/day of protein, or  grams of protein every day.  - In regard to protein intake, you can focus on: All meats, cooked fish and seafood, vegetable-based protein meat products, tofu, eggs, legumes, dairy products (including milk and yogurt and cheese), unsalted nuts.  - You should eat at least three meals a day and three to four snacks between meals  - Bedtime snacks are especially important (preferrably something with some protein)    - Patients with malnutrition and/or loss of muscular mass can improve their nutrition and muscular mass by drinking two cans of dietary supplements daily, particularly at bedtime.  These would include: Ensure, Boost, [place] Instant Milk, Glucerna, (or similar supplements)  - Please avoid eating raw seafood, especially shellfish, because of risk of serious illness  - We recommend all patients with cirrhosis limit their daily sodium intake to less than 2,000mg      General Liver Health  - Continue to avoid the use of all non-steroid anti-inflammatory medicines (ibuprofen, Aleve, naproxen, aspirin, etc.) as this can cause serious injury to your kidneys in the setting of liver disease  - If you see a doctor and they prescribe you a new medication, please contact our clinic to let us know what changes are being made  - If you become acutely ill and present to an ER or are admitted to a hospital, please let us know as soon as you are able.  - We recommend that all patients with chronic liver  disease be vaccinated against hepatitis A and B.  Please discuss with your primary provider the need for these vaccines  - As patients with liver disease are at an increased risk of developing osteoporosis, we recommend that you have a DEXA scan with appropriate follow up and treatment.   - We recommend screening for Vitamin D deficiency (at least yearly) and aggressive replacement/supplementation as warranted.    Sleep Troubles:  - Sleep issues are very common in patients with chronic liver disease  - Recommend strict avoidance of medications such as narcotics, sedatives and sleep aids.    - Low dose benadryl or melatonin can be used for insomnia, if needed.

## 2021-09-27 NOTE — H&P (VIEW-ONLY)
Date of Service: September 27, 2021     Subjective:            Berta Nava is a 56 year old female presenting for evaluation of liver disease    History of Present Illness   Berta Nava is a 56 year old female with past medical history of hypertension, microscopic colitis, ulcerative colitis, pancreatic mucinous cystadenoma, primary sclerosing cholangitis who presents with concerns of cirrhosis complicated by esophageal varices, ascites.    She was brought into the hospital on August 18 and was undergoing liver transplantation, but ultimately developed either V. tach or wide-complex atrial fibrillation that was unstable.  During operative course she was shocked at least 5 times, and it was a committee decision that the transplant be aborted given her cardiogenic instability.  She underwent a left heart cath and an MRI of the heart which did not demonstrate any overt abnormalities that would have predisposed her to this condition.  She was treated with amiodarone during the hospital course but electrophysiology felt that she could safely discontinue.  Initially postop she had a ROMA drain for her ascites, but this was discontinued when her volumes dropped.  She also was felt to have significant protein calorie malnutrition and a feeding tube was placed for temporary boosting of her nutritional status    She reports that she is having significant upper respiratory issues related to her NG tube, and does feel that she is able to maintain her caloric intake safely without it.  She is very committed to taking 3 protein supplement drinks daily in addition to the foods that she eats in order to not need the feeding tube.  Notes that she had the staples removed from her bilateral subcostal incisions last week, but there were still some staples in place.  Also noted she has a suture in place at the drain insertion site.    Reports that she still requiring a paracentesis once weekly, and that they are removing between 5  to 6 L.  She notes the day before the procedure she is incredibly uncomfortable.  She is currently on Lasix 40 mg daily and spironolactone 100 mg daily.        Past Medical History:  Past Medical History:   Diagnosis Date     Ascites      Biliary cirrhosis (H)      Cholangitis, sclerosing      Cirrhosis of liver with ascites (H) 3/3/2021     Hearing loss of left ear     wears a hearing aide     History of low potassium      Hyperlipidemia      Hypertension      SBP (spontaneous bacterial peritonitis) (H) 4/30/2021     Sjogren's syndrome (H)      Ulcerative colitis (H)      Ulcerative pancolitis (H)        Surgical History:  Past Surgical History:   Procedure Laterality Date     CV CORONARY ANGIOGRAM N/A 8/25/2021    Procedure: Coronary Angiogram with possible intervention;  Surgeon: David Wilhelm MD;  Location:  [facility]     ENDOSCOPIC RETROGRADE CHOLANGIOPANCREATOGRAM N/A 6/25/2021    Procedure: ENDOSCOPIC RETROGRADE CHOLANGIOPANCREATOGRAPHY with ballon sweep of bile ducts for stones, balloon dilation of bile ducts and bile duct stent placement;  Surgeon: Gregory Gabriel MD;  Location:  OR     ENDOSCOPIC RETROGRADE CHOLANGIOPANCREATOGRAM N/A 7/22/2021    Procedure: ENDOSCOPIC RETROGRADE CHOLANGIOPANCREATOGRAPHY STONE REMOVAL, DILATION AND STENT PLACEMENT;  Surgeon: Gregory Gabriel MD;  Location:  OR     ENDOSCOPIC RETROGRADE CHOLANGIOPANCREATOGRAPHY, EXCHANGE TUBE/STENT N/A 05/05/2021    Procedure: ENDOSCOPIC RETROGRADE CHOLANGIOPANCREATOGRAPHY WITH STENT EXCHANGE, STONE EXTRACTION, AND DILATION;  Surgeon: Gregory Gabriel MD;  Location:  OR     ENDOSCOPIC RETROGRADE CHOLANGIOPANCREATOGRAPHY, EXCHANGE TUBE/STENT N/A 5/10/2021    Procedure: ENDOSCOPIC RETROGRADE CHOLANGIOPANCREATOGRAPHY with biliary dilation, stone removal, stent exchange;  Surgeon: Gregory Gabriel MD;  Location:  OR     ENDOSCOPIC RETROGRADE CHOLANGIOPANCREATOGRAPHY, EXCHANGE TUBE/STENT N/A 6/10/2021    Procedure:  ENDOSCOPIC RETROGRADE CHOLANGIOPANCREATOGRAPHY, WITH biliary stent exchange, stone removal;  Surgeon: Woodrow Lott MD;  Location: [facility] OR     ENDOSCOPIC RETROGRADE CHOLANGIOPANCREATOGRAPHY, EXCHANGE TUBE/STENT N/A 2021    Procedure: ENDOSCOPIC RETROGRADE CHOLANGIOPANCREATOGRAPHY with biliary dilation, stone removal, stent exchange;  Surgeon: Gregory Gabriel MD;  Location: [facility] OR     ESOPHAGOSCOPY, GASTROSCOPY, DUODENOSCOPY (EGD), COMBINED N/A 2021    Procedure: ESOPHAGOGASTRODUODENOSCOPY (EGD);  Surgeon: Leventhal, Thomas Michael, MD;  Location: [facility] GI     GYN SURGERY      bilat fallopian tubes and ovaries removed     PICC DOUBLE LUMEN PLACEMENT Right 2021    42cm (2cm external), Lateral brachial vein     TRANSPLANT LIVER RECIPIENT  DONOR N/A 2021    Procedure: Opening of abdomen, Abdominal Exploration and aborted liver transplant.;  Surgeon: Ernesto Schmitt MD;  Location: [facility] OR   - BSO    Social History:  Social History     Tobacco Use     Smoking status: Never Smoker     Smokeless tobacco: Never Used   Vaping Use     Vaping Use: Never used   Substance Use Topics     Alcohol use: No     Comment: Last drink was 2017     Drug use: No       Family History:  -Denies a family history of overt liver disease.  Notes that her mother  of complications of an angiosarcoma.  Her father had coronary artery disease with a heart attack at the age of 46.  Ultimately he underwent a heart transplant and lived for 15 years, dying in his 70s.  There is no family history of ulcerative colitis, Crohn's disease.  Unclear history of thyroid disease.    Medications:  Current Outpatient Medications   Medication     ciprofloxacin (CIPRO) 500 MG tablet     furosemide (LASIX) 20 MG tablet     lactulose (CEPHULAC) 10 GM packet     multivitamin (CENTRUM SILVER) tablet     pantoprazole (PROTONIX) 40 MG EC tablet     polyethylene glycol (MIRALAX) 17 GM/Dose powder     protein modular (PROSOURCE TF) LIQD  "    spironolactone (ALDACTONE) 50 MG tablet     ursodiol (ACTIGALL) 300 MG capsule     No current facility-administered medications for this visit.       Review of Systems  A complete 10 point review of systems was asked and answered in the negative unless specifically commented upon in the HPI    Objective:         Vitals:    09/27/21 0830   BP: 103/70   BP Location: Right arm   Patient Position: Sitting   Cuff Size: Adult Small   Pulse: 90   Resp: 18   Temp: 97.9  F (36.6  C)   TempSrc: Pulmonary Artery   SpO2: 100%   Weight: 63.8 kg (140 lb 11.2 oz)   Height: 1.651 m (5' 5\")     Body mass index is 23.41 kg/m .     Physical Exam  Constitutional: thin  HEENT: Normocephalic. faint scleral icterus. Moist oral mucosa. Dentition normal. Feeding tube in place  Neck/Lymph: Normal ROM  Cardiac:  Regular rate, no murmurs  Respiratory: Normal respiratory excursion, no wheezes  GI:  Abdomen soft, distended, non-tender. BS present. B/l subcostal incision: wound breakdown left margin. 1 staple in place. RLQ incision with suture  Skin:  jaundice.  Peripheral Vascular: no lower extremity edema. 2+ pulses in all extremities  Musculoskeletal:  ROM intact, decreased muscle bulk    Psychiatric: Normal mood and affect. Behavior is normal.  Neuro:  no asterixis, no tremor    Labs and Diagnostic tests:    MELD-Na score: 22 at 9/27/2021  7:58 AM  MELD score: 15 at 9/27/2021  7:58 AM  Calculated from:  Serum Creatinine: 0.77 mg/dL (Using min of 1 mg/dL) at 9/27/2021  7:58 AM  Serum Sodium: 129 mmol/L at 9/27/2021  7:58 AM  Total Bilirubin: 5.0 mg/dL at 9/27/2021  7:58 AM  INR(ratio): 1.28 at 9/27/2021  7:58 AM  Age: 56 years    Imaging:  Abd US 8/18/20121  Findings:  Liver: Coarsened echotexture and nodular contour. Measures 12.1 cm.     Extrahepatic portal vein flow is retrograde at 9 cm/s.  Right portal vein flow is retrograde, measuring 10 cm/s.  Left portal vein flow is retrograde, measuring 16 cm/s.  Patent umbilical vein.     Flow " in the hepatic artery is towards the liver and:  54 cm/s peak systolic  0.76 resistive index.      Splenic vein is retrograde. The left, middle, and right hepatic veins  are patent with flow towards the IVC. The IVC is patent with flow  towards the heart.   The visualized aorta is not dilated.     Gallbladder: Gallbladder sludge with significant wall thickening,  likely secondary to volume status.     Bile Ducts: Both the intra- and extrahepatic biliary system are of  normal caliber.  The common bile duct measures 3 mm.     Pancreas: Visualized portions of the head and body of the pancreas are  unremarkable.      Right kidney:  Right kidney with normal echotexture without  hydronephrosis or mass. Measures 12 cm.     Fluid: Small to moderate volume ascites      Procedures:  Liver biopsy: 6/4/2012   Liver, needle biopsy:   - Chronic liver disease with bridging fibrosis and architectural     distortion, rule out primary biliary cirrhosis.    EGD: 12/1/2020  - Grade II and large (> 5 mm)                        esophageal varices.                       - Z-line regular, 40 cm from the                        incisors.                       - Portal hypertensive gastropathy.                       - Gastric erosions without bleeding.                        Biopsied.                       - Normal examined duodenum.    EUS 12/1/2020  - Many abnormal lymph nodes were                        visualized in the subcarinal                        mediastinum (level 7), lower                        paraesophageal mediastinum (level                        8L), gastrohepatic ligament (level                        18), peripancreatic region, ranjit                        hepatis region and aortocaval region.                        Fine needle aspiration performed.                        Largest lymph nodes periportal or                        aortocaval, approximately 2-3 cm.                        Portal Lymph node aspirated for                         cytology, culture, flow cytometry                       - Hyperechoic material consistent                        with sludge was visualized                        endosonographically in the mid common                        bile duct, causing upstream dilation                        of it to up to 10 mm. Small amount of                        sludge in the gallbladder.                       - Endosonographic imaging of the                        pancreas showed sonographic changes                        consistent with mild-moderate chronic                        pancreatitis. Poorly visualized                        pancreas, specially tail    Colonoscopy: 12/23/2020  Findings:       The terminal ileum appeared normal.       A diffuse area of mildly erythematous mucosa was        found in the descending colon, in the transverse        colon, at the hepatic flexure, in the ascending colon        and in the cecum. This was biopsied with a cold        forceps for histology.       Normal mucosa was found in the rectum. Biopsies were        taken with a cold forceps for histology.       Internal hemorrhoids were found during retroflexion.       No additional abnormalities were found on        retroflexion.    Assessment and Plan:    PSC related Cirrhosis:    - on weight-based ursodiol at 600 mg twice daily  - Continues to follow with advanced endo for stent exchange  - MELD labs per protocol    Ulcerative Colitis:  - Stopped mesalamine and no symptoms  - will follow for changes in symptoms    Transplant Status:  - Listed for transplant, ACTIVE  - ABO - AB  - Had recent cardiac MRI and left heart cath: all normal.    - Has EP follow up scheduled    Cancer Risk  - Agree with screening for cholangiocarcinoma in this patient.  We would recommend the following regimen:   - YEARLY MRI/MRCP    - YEARLY abdominal ultrasound with dopplers: this should be performed every alternate 6 months between MRIs.  This can  be performed locally and should be an exam of the complete abdomen, commenting upon spleen size   - Twice yearly  and AFP (at time of imaging studies)  (please note there is no specific recommendation from the AASLD or other GI professional society to support this screening regimen.)  - Continue with yearly colonoscopy    Hepatocellular Cancer Screening:   - US 8/2021 without masses  - Recommend screening for HCC every 6 months with either abdominal ultrasound or by alternating abdominal ultrasound with EITHER a triple/quad phase CT Liver with IV contrast OR a Quad phase MRI Liver with IV contrast.  AFP levels should be checked every 6 months at time of imaging screen.    Ascites:  -She has decompensated disease and has been getting paracentesis q. Weekly  -We will increase her diuretics to Lasix 40 mg QAM and 20mg QPM and spironolactone 100 mg QAM and 50mg Q PM  - Continue on ciprofloxacin  - Continue to follow a sodium restricted (<2g sodium diet)     Hepatic Encephalopathy:  -No acute issues at this time; she was educated as to the warning signs and symptoms    Esophageal Varices:   -She does have large varices on US from December 2020  - Will request advanced endoscopy band AFTER stent exchange this FRIDAY  -As she has refractory ascites requiring paracentesis, felt that benefits versus risks of ongoing beta-blockade are not warranted and will discontinue carvedilol at this time      In clinic procedures:  - Removed ND feeding tube and staple removal    Nutrition:  As with most patients with chronic liver disease, there is a significant degree of protein malnutrition.  Dicussed need to change dietary habits to improve overall protein balance.  Discussed the importance of eating between 1.2-1.5g/kg/day lean protein like eggs, fish, chicken, nuts, and legumes, in addition to a diet rich in fresh fruits and vegetables.  Continue to follow a sodium restricted (<2g sodium diet) and discussed the need to  minimize the intake of carbohydrates and sugars, to avoid obesity.   - Strongly encourage protein supplements 2-3 times daily (Boost, Ensure, [place] Instant Milk, etc.) to meet protein and caloric intake.  - Recommend a bedtime snack with protein and complex carbohydrate to minimize risk of muscle catabolism overnight    Routine Health Care in Patient with Chronic Liver Disease:  - We recommend screening for hepatitis A and B, please vaccinate if not immune  - All patients with liver disease, particularly those with cholestatic liver disease, are at an increased risk for osteoporosis.  We strongly recommend screening for Vitamin D deficiency at least twice yearly with aggressive supplementation/replacement as indicated.    - We also recommend a screening DEXA scan to evaluate for osteoporosis.  If present, should treat with calcium, Vitamin D supplementation, and recommend consideration of bisphosphonate therapy.  Also recommend follow up DEXA scans to evaluate for improvement of bone density on therapy.  - All patients with liver disease should avoid the use of Non-steroidal Anti-Inflammatory (NSAID) medications as they can cause significant injury to the kidneys in this population    Follow Up:  2 months     Thank you very much for the opportunity to participate in the care of this patient.  If you have any further questions, please don't hesitate to contact our office.    Thomas M. Leventhal, M.D.   of Medicine  Advanced & Transplant Hepatology  [facility]    We discussed participation in the [facility] patient-related outcomes study.  The patient understands that the study may later link survey data with clinical data.  The patient would like to be contacted to participate in the study.

## 2021-09-27 NOTE — PROGRESS NOTES
Date of Service: September 27, 2021     Subjective:            Berta Nava is a 56 year old female presenting for evaluation of liver disease    History of Present Illness   Berta Nava is a 56 year old female with past medical history of hypertension, microscopic colitis, ulcerative colitis, pancreatic mucinous cystadenoma, primary sclerosing cholangitis who presents with concerns of cirrhosis complicated by esophageal varices, ascites.    She was brought into the hospital on August 18 and was undergoing liver transplantation, but ultimately developed either V. tach or wide-complex atrial fibrillation that was unstable.  During operative course she was shocked at least 5 times, and it was a committee decision that the transplant be aborted given her cardiogenic instability.  She underwent a left heart cath and an MRI of the heart which did not demonstrate any overt abnormalities that would have predisposed her to this condition.  She was treated with amiodarone during the hospital course but electrophysiology felt that she could safely discontinue.  Initially postop she had a ROMA drain for her ascites, but this was discontinued when her volumes dropped.  She also was felt to have significant protein calorie malnutrition and a feeding tube was placed for temporary boosting of her nutritional status    She reports that she is having significant upper respiratory issues related to her NG tube, and does feel that she is able to maintain her caloric intake safely without it.  She is very committed to taking 3 protein supplement drinks daily in addition to the foods that she eats in order to not need the feeding tube.  Notes that she had the staples removed from her bilateral subcostal incisions last week, but there were still some staples in place.  Also noted she has a suture in place at the drain insertion site.    Reports that she still requiring a paracentesis once weekly, and that they are removing between 5  to 6 L.  She notes the day before the procedure she is incredibly uncomfortable.  She is currently on Lasix 40 mg daily and spironolactone 100 mg daily.        Past Medical History:  Past Medical History:   Diagnosis Date     Ascites      Biliary cirrhosis (H)      Cholangitis, sclerosing      Cirrhosis of liver with ascites (H) 3/3/2021     Hearing loss of left ear     wears a hearing aide     History of low potassium      Hyperlipidemia      Hypertension      SBP (spontaneous bacterial peritonitis) (H) 4/30/2021     Sjogren's syndrome (H)      Ulcerative colitis (H)      Ulcerative pancolitis (H)        Surgical History:  Past Surgical History:   Procedure Laterality Date     CV CORONARY ANGIOGRAM N/A 8/25/2021    Procedure: Coronary Angiogram with possible intervention;  Surgeon: David Wilhelm MD;  Location:  [facility]     ENDOSCOPIC RETROGRADE CHOLANGIOPANCREATOGRAM N/A 6/25/2021    Procedure: ENDOSCOPIC RETROGRADE CHOLANGIOPANCREATOGRAPHY with ballon sweep of bile ducts for stones, balloon dilation of bile ducts and bile duct stent placement;  Surgeon: Gregory Gabriel MD;  Location:  OR     ENDOSCOPIC RETROGRADE CHOLANGIOPANCREATOGRAM N/A 7/22/2021    Procedure: ENDOSCOPIC RETROGRADE CHOLANGIOPANCREATOGRAPHY STONE REMOVAL, DILATION AND STENT PLACEMENT;  Surgeon: Gregory Gabriel MD;  Location:  OR     ENDOSCOPIC RETROGRADE CHOLANGIOPANCREATOGRAPHY, EXCHANGE TUBE/STENT N/A 05/05/2021    Procedure: ENDOSCOPIC RETROGRADE CHOLANGIOPANCREATOGRAPHY WITH STENT EXCHANGE, STONE EXTRACTION, AND DILATION;  Surgeon: Gregory Gabriel MD;  Location:  OR     ENDOSCOPIC RETROGRADE CHOLANGIOPANCREATOGRAPHY, EXCHANGE TUBE/STENT N/A 5/10/2021    Procedure: ENDOSCOPIC RETROGRADE CHOLANGIOPANCREATOGRAPHY with biliary dilation, stone removal, stent exchange;  Surgeon: Gregory Gabriel MD;  Location:  OR     ENDOSCOPIC RETROGRADE CHOLANGIOPANCREATOGRAPHY, EXCHANGE TUBE/STENT N/A 6/10/2021    Procedure:  ENDOSCOPIC RETROGRADE CHOLANGIOPANCREATOGRAPHY, WITH biliary stent exchange, stone removal;  Surgeon: Woodrow Lott MD;  Location: [facility] OR     ENDOSCOPIC RETROGRADE CHOLANGIOPANCREATOGRAPHY, EXCHANGE TUBE/STENT N/A 2021    Procedure: ENDOSCOPIC RETROGRADE CHOLANGIOPANCREATOGRAPHY with biliary dilation, stone removal, stent exchange;  Surgeon: Gregory Gabriel MD;  Location: [facility] OR     ESOPHAGOSCOPY, GASTROSCOPY, DUODENOSCOPY (EGD), COMBINED N/A 2021    Procedure: ESOPHAGOGASTRODUODENOSCOPY (EGD);  Surgeon: Leventhal, Thomas Michael, MD;  Location: [facility]     GYN SURGERY      bilat fallopian tubes and ovaries removed     PICC DOUBLE LUMEN PLACEMENT Right 2021    42cm (2cm external), Lateral brachial vein     TRANSPLANT LIVER RECIPIENT  DONOR N/A 2021    Procedure: Opening of abdomen, Abdominal Exploration and aborted liver transplant.;  Surgeon: Ernesto Schmitt MD;  Location: [facility] OR   - BSO    Social History:  Social History     Tobacco Use     Smoking status: Never Smoker     Smokeless tobacco: Never Used   Vaping Use     Vaping Use: Never used   Substance Use Topics     Alcohol use: No     Comment: Last drink was 2017     Drug use: No       Family History:  -Denies a family history of overt liver disease.  Notes that her mother  of complications of an angiosarcoma.  Her father had coronary artery disease with a heart attack at the age of 46.  Ultimately he underwent a heart transplant and lived for 15 years, dying in his 70s.  There is no family history of ulcerative colitis, Crohn's disease.  Unclear history of thyroid disease.    Medications:  Current Outpatient Medications   Medication     ciprofloxacin (CIPRO) 500 MG tablet     furosemide (LASIX) 20 MG tablet     lactulose (CEPHULAC) 10 GM packet     multivitamin (CENTRUM SILVER) tablet     pantoprazole (PROTONIX) 40 MG EC tablet     polyethylene glycol (MIRALAX) 17 GM/Dose powder     protein modular (PROSOURCE TF) LIQD  "    spironolactone (ALDACTONE) 50 MG tablet     ursodiol (ACTIGALL) 300 MG capsule     No current facility-administered medications for this visit.       Review of Systems  A complete 10 point review of systems was asked and answered in the negative unless specifically commented upon in the HPI    Objective:         Vitals:    09/27/21 0830   BP: 103/70   BP Location: Right arm   Patient Position: Sitting   Cuff Size: Adult Small   Pulse: 90   Resp: 18   Temp: 97.9  F (36.6  C)   TempSrc: Pulmonary Artery   SpO2: 100%   Weight: 63.8 kg (140 lb 11.2 oz)   Height: 1.651 m (5' 5\")     Body mass index is 23.41 kg/m .     Physical Exam  Constitutional: thin  HEENT: Normocephalic. faint scleral icterus. Moist oral mucosa. Dentition normal. Feeding tube in place  Neck/Lymph: Normal ROM  Cardiac:  Regular rate, no murmurs  Respiratory: Normal respiratory excursion, no wheezes  GI:  Abdomen soft, distended, non-tender. BS present. B/l subcostal incision: wound breakdown left margin. 1 staple in place. RLQ incision with suture  Skin:  jaundice.  Peripheral Vascular: no lower extremity edema. 2+ pulses in all extremities  Musculoskeletal:  ROM intact, decreased muscle bulk    Psychiatric: Normal mood and affect. Behavior is normal.  Neuro:  no asterixis, no tremor    Labs and Diagnostic tests:    MELD-Na score: 22 at 9/27/2021  7:58 AM  MELD score: 15 at 9/27/2021  7:58 AM  Calculated from:  Serum Creatinine: 0.77 mg/dL (Using min of 1 mg/dL) at 9/27/2021  7:58 AM  Serum Sodium: 129 mmol/L at 9/27/2021  7:58 AM  Total Bilirubin: 5.0 mg/dL at 9/27/2021  7:58 AM  INR(ratio): 1.28 at 9/27/2021  7:58 AM  Age: 56 years    Imaging:  Abd US 8/18/20121  Findings:  Liver: Coarsened echotexture and nodular contour. Measures 12.1 cm.     Extrahepatic portal vein flow is retrograde at 9 cm/s.  Right portal vein flow is retrograde, measuring 10 cm/s.  Left portal vein flow is retrograde, measuring 16 cm/s.  Patent umbilical vein.     Flow " in the hepatic artery is towards the liver and:  54 cm/s peak systolic  0.76 resistive index.      Splenic vein is retrograde. The left, middle, and right hepatic veins  are patent with flow towards the IVC. The IVC is patent with flow  towards the heart.   The visualized aorta is not dilated.     Gallbladder: Gallbladder sludge with significant wall thickening,  likely secondary to volume status.     Bile Ducts: Both the intra- and extrahepatic biliary system are of  normal caliber.  The common bile duct measures 3 mm.     Pancreas: Visualized portions of the head and body of the pancreas are  unremarkable.      Right kidney:  Right kidney with normal echotexture without  hydronephrosis or mass. Measures 12 cm.     Fluid: Small to moderate volume ascites      Procedures:  Liver biopsy: 6/4/2012   Liver, needle biopsy:   - Chronic liver disease with bridging fibrosis and architectural     distortion, rule out primary biliary cirrhosis.    EGD: 12/1/2020  - Grade II and large (> 5 mm)                        esophageal varices.                       - Z-line regular, 40 cm from the                        incisors.                       - Portal hypertensive gastropathy.                       - Gastric erosions without bleeding.                        Biopsied.                       - Normal examined duodenum.    EUS 12/1/2020  - Many abnormal lymph nodes were                        visualized in the subcarinal                        mediastinum (level 7), lower                        paraesophageal mediastinum (level                        8L), gastrohepatic ligament (level                        18), peripancreatic region, ranjit                        hepatis region and aortocaval region.                        Fine needle aspiration performed.                        Largest lymph nodes periportal or                        aortocaval, approximately 2-3 cm.                        Portal Lymph node aspirated for                         cytology, culture, flow cytometry                       - Hyperechoic material consistent                        with sludge was visualized                        endosonographically in the mid common                        bile duct, causing upstream dilation                        of it to up to 10 mm. Small amount of                        sludge in the gallbladder.                       - Endosonographic imaging of the                        pancreas showed sonographic changes                        consistent with mild-moderate chronic                        pancreatitis. Poorly visualized                        pancreas, specially tail    Colonoscopy: 12/23/2020  Findings:       The terminal ileum appeared normal.       A diffuse area of mildly erythematous mucosa was        found in the descending colon, in the transverse        colon, at the hepatic flexure, in the ascending colon        and in the cecum. This was biopsied with a cold        forceps for histology.       Normal mucosa was found in the rectum. Biopsies were        taken with a cold forceps for histology.       Internal hemorrhoids were found during retroflexion.       No additional abnormalities were found on        retroflexion.    Assessment and Plan:    PSC related Cirrhosis:    - on weight-based ursodiol at 600 mg twice daily  - Continues to follow with advanced endo for stent exchange  - MELD labs per protocol    Ulcerative Colitis:  - Stopped mesalamine and no symptoms  - will follow for changes in symptoms    Transplant Status:  - Listed for transplant, ACTIVE  - ABO - AB  - Had recent cardiac MRI and left heart cath: all normal.    - Has EP follow up scheduled    Cancer Risk  - Agree with screening for cholangiocarcinoma in this patient.  We would recommend the following regimen:   - YEARLY MRI/MRCP    - YEARLY abdominal ultrasound with dopplers: this should be performed every alternate 6 months between MRIs.  This can  be performed locally and should be an exam of the complete abdomen, commenting upon spleen size   - Twice yearly  and AFP (at time of imaging studies)  (please note there is no specific recommendation from the AASLD or other GI professional society to support this screening regimen.)  - Continue with yearly colonoscopy    Hepatocellular Cancer Screening:   - US 8/2021 without masses  - Recommend screening for HCC every 6 months with either abdominal ultrasound or by alternating abdominal ultrasound with EITHER a triple/quad phase CT Liver with IV contrast OR a Quad phase MRI Liver with IV contrast.  AFP levels should be checked every 6 months at time of imaging screen.    Ascites:  -She has decompensated disease and has been getting paracentesis q. Weekly  -We will increase her diuretics to Lasix 40 mg QAM and 20mg QPM and spironolactone 100 mg QAM and 50mg Q PM  - Continue on ciprofloxacin  - Continue to follow a sodium restricted (<2g sodium diet)     Hepatic Encephalopathy:  -No acute issues at this time; she was educated as to the warning signs and symptoms    Esophageal Varices:   -She does have large varices on US from December 2020  - Will request advanced endoscopy band AFTER stent exchange this FRIDAY  -As she has refractory ascites requiring paracentesis, felt that benefits versus risks of ongoing beta-blockade are not warranted and will discontinue carvedilol at this time      In clinic procedures:  - Removed ND feeding tube and staple removal    Nutrition:  As with most patients with chronic liver disease, there is a significant degree of protein malnutrition.  Dicussed need to change dietary habits to improve overall protein balance.  Discussed the importance of eating between 1.2-1.5g/kg/day lean protein like eggs, fish, chicken, nuts, and legumes, in addition to a diet rich in fresh fruits and vegetables.  Continue to follow a sodium restricted (<2g sodium diet) and discussed the need to  minimize the intake of carbohydrates and sugars, to avoid obesity.   - Strongly encourage protein supplements 2-3 times daily (Boost, Ensure, [place] Instant Milk, etc.) to meet protein and caloric intake.  - Recommend a bedtime snack with protein and complex carbohydrate to minimize risk of muscle catabolism overnight    Routine Health Care in Patient with Chronic Liver Disease:  - We recommend screening for hepatitis A and B, please vaccinate if not immune  - All patients with liver disease, particularly those with cholestatic liver disease, are at an increased risk for osteoporosis.  We strongly recommend screening for Vitamin D deficiency at least twice yearly with aggressive supplementation/replacement as indicated.    - We also recommend a screening DEXA scan to evaluate for osteoporosis.  If present, should treat with calcium, Vitamin D supplementation, and recommend consideration of bisphosphonate therapy.  Also recommend follow up DEXA scans to evaluate for improvement of bone density on therapy.  - All patients with liver disease should avoid the use of Non-steroidal Anti-Inflammatory (NSAID) medications as they can cause significant injury to the kidneys in this population    Follow Up:  2 months     Thank you very much for the opportunity to participate in the care of this patient.  If you have any further questions, please don't hesitate to contact our office.    Thomas M. Leventhal, M.D.   of Medicine  Advanced & Transplant Hepatology  [facility]    We discussed participation in the [facility] patient-related outcomes study.  The patient understands that the study may later link survey data with clinical data.  The patient would like to be contacted to participate in the study.

## 2021-09-27 NOTE — LETTER
9/27/2021         RE: Berta Nava  [address] [facility] [id number]        Dear Colleague,    Thank you for referring your patient, Berta Nava, to the [facility]. Please see a copy of my visit note below.    Date of Service: September 27, 2021     Subjective:            Berta Nava is a 56 year old female presenting for evaluation of liver disease    History of Present Illness   Berta Nava is a 56 year old female with past medical history of hypertension, microscopic colitis, ulcerative colitis, pancreatic mucinous cystadenoma, primary sclerosing cholangitis who presents with concerns of cirrhosis complicated by esophageal varices, ascites.    She was brought into the hospital on August 18 and was undergoing liver transplantation, but ultimately developed either V. tach or wide-complex atrial fibrillation that was unstable.  During operative course she was shocked at least 5 times, and it was a committee decision that the transplant be aborted given her cardiogenic instability.  She underwent a left heart cath and an MRI of the heart which did not demonstrate any overt abnormalities that would have predisposed her to this condition.  She was treated with amiodarone during the hospital course but electrophysiology felt that she could safely discontinue.  Initially postop she had a ROMA drain for her ascites, but this was discontinued when her volumes dropped.  She also was felt to have significant protein calorie malnutrition and a feeding tube was placed for temporary boosting of her nutritional status    She reports that she is having significant upper respiratory issues related to her NG tube, and does feel that she is able to maintain her caloric intake safely without it.  She is very committed to taking 3 protein supplement drinks daily in addition to the foods that she eats in order to not need the feeding tube.  Notes that she had the staples  removed from her bilateral subcostal incisions last week, but there were still some staples in place.  Also noted she has a suture in place at the drain insertion site.    Reports that she still requiring a paracentesis once weekly, and that they are removing between 5 to 6 L.  She notes the day before the procedure she is incredibly uncomfortable.  She is currently on Lasix 40 mg daily and spironolactone 100 mg daily.        Past Medical History:  Past Medical History:   Diagnosis Date     Ascites      Biliary cirrhosis (H)      Cholangitis, sclerosing      Cirrhosis of liver with ascites (H) 3/3/2021     Hearing loss of left ear     wears a hearing aide     History of low potassium      Hyperlipidemia      Hypertension      SBP (spontaneous bacterial peritonitis) (H) 4/30/2021     Sjogren's syndrome (H)      Ulcerative colitis (H)      Ulcerative pancolitis (H)        Surgical History:  Past Surgical History:   Procedure Laterality Date     CV CORONARY ANGIOGRAM N/A 8/25/2021    Procedure: Coronary Angiogram with possible intervention;  Surgeon: David Wilhelm MD;  Location:  HEART CARDIAC CATH LAB     ENDOSCOPIC RETROGRADE CHOLANGIOPANCREATOGRAM N/A 6/25/2021    Procedure: ENDOSCOPIC RETROGRADE CHOLANGIOPANCREATOGRAPHY with ballon sweep of bile ducts for stones, balloon dilation of bile ducts and bile duct stent placement;  Surgeon: Gregory Gabriel MD;  Location:  OR     ENDOSCOPIC RETROGRADE CHOLANGIOPANCREATOGRAM N/A 7/22/2021    Procedure: ENDOSCOPIC RETROGRADE CHOLANGIOPANCREATOGRAPHY STONE REMOVAL, DILATION AND STENT PLACEMENT;  Surgeon: Gregory Gabriel MD;  Location:  OR     ENDOSCOPIC RETROGRADE CHOLANGIOPANCREATOGRAPHY, EXCHANGE TUBE/STENT N/A 05/05/2021    Procedure: ENDOSCOPIC RETROGRADE CHOLANGIOPANCREATOGRAPHY WITH STENT EXCHANGE, STONE EXTRACTION, AND DILATION;  Surgeon: Gregory Gabriel MD;  Location:  OR     ENDOSCOPIC RETROGRADE CHOLANGIOPANCREATOGRAPHY, EXCHANGE TUBE/STENT N/A  5/10/2021    Procedure: ENDOSCOPIC RETROGRADE CHOLANGIOPANCREATOGRAPHY with biliary dilation, stone removal, stent exchange;  Surgeon: Gregory Gabriel MD;  Location: [facility] OR     ENDOSCOPIC RETROGRADE CHOLANGIOPANCREATOGRAPHY, EXCHANGE TUBE/STENT N/A 6/10/2021    Procedure: ENDOSCOPIC RETROGRADE CHOLANGIOPANCREATOGRAPHY, WITH biliary stent exchange, stone removal;  Surgeon: Woodrow Lott MD;  Location: [facility] OR     ENDOSCOPIC RETROGRADE CHOLANGIOPANCREATOGRAPHY, EXCHANGE TUBE/STENT N/A 2021    Procedure: ENDOSCOPIC RETROGRADE CHOLANGIOPANCREATOGRAPHY with biliary dilation, stone removal, stent exchange;  Surgeon: Gregory Gabriel MD;  Location: [facility] OR     ESOPHAGOSCOPY, GASTROSCOPY, DUODENOSCOPY (EGD), COMBINED N/A 2021    Procedure: ESOPHAGOGASTRODUODENOSCOPY (EGD);  Surgeon: Leventhal, Thomas Michael, MD;  Location: [facility] GI     GYN SURGERY      bilat fallopian tubes and ovaries removed     PICC DOUBLE LUMEN PLACEMENT Right 2021    42cm (2cm external), Lateral brachial vein     TRANSPLANT LIVER RECIPIENT  DONOR N/A 2021    Procedure: Opening of abdomen, Abdominal Exploration and aborted liver transplant.;  Surgeon: Ernesto Schmitt MD;  Location: [facility] OR   - BSO    Social History:  Social History     Tobacco Use     Smoking status: Never Smoker     Smokeless tobacco: Never Used   Vaping Use     Vaping Use: Never used   Substance Use Topics     Alcohol use: No     Comment: Last drink was 2017     Drug use: No       Family History:  -Denies a family history of overt liver disease.  Notes that her mother  of complications of an angiosarcoma.  Her father had coronary artery disease with a heart attack at the age of 46.  Ultimately he underwent a heart transplant and lived for 15 years, dying in his 70s.  There is no family history of ulcerative colitis, Crohn's disease.  Unclear history of thyroid disease.    Medications:  Current Outpatient Medications   Medication     ciprofloxacin  "(CIPRO) 500 MG tablet     furosemide (LASIX) 20 MG tablet     lactulose (CEPHULAC) 10 GM packet     multivitamin (CENTRUM SILVER) tablet     pantoprazole (PROTONIX) 40 MG EC tablet     polyethylene glycol (MIRALAX) 17 GM/Dose powder     protein modular (PROSOURCE TF) LIQD     spironolactone (ALDACTONE) 50 MG tablet     ursodiol (ACTIGALL) 300 MG capsule     No current facility-administered medications for this visit.       Review of Systems  A complete 10 point review of systems was asked and answered in the negative unless specifically commented upon in the HPI    Objective:         Vitals:    09/27/21 0830   BP: 103/70   BP Location: Right arm   Patient Position: Sitting   Cuff Size: Adult Small   Pulse: 90   Resp: 18   Temp: 97.9  F (36.6  C)   TempSrc: Pulmonary Artery   SpO2: 100%   Weight: 63.8 kg (140 lb 11.2 oz)   Height: 1.651 m (5' 5\")     Body mass index is 23.41 kg/m .     Physical Exam  Constitutional: thin  HEENT: Normocephalic. faint scleral icterus. Moist oral mucosa. Dentition normal. Feeding tube in place  Neck/Lymph: Normal ROM  Cardiac:  Regular rate, no murmurs  Respiratory: Normal respiratory excursion, no wheezes  GI:  Abdomen soft, distended, non-tender. BS present. B/l subcostal incision: wound breakdown left margin. 1 staple in place. RLQ incision with suture  Skin:  jaundice.  Peripheral Vascular: no lower extremity edema. 2+ pulses in all extremities  Musculoskeletal:  ROM intact, decreased muscle bulk    Psychiatric: Normal mood and affect. Behavior is normal.  Neuro:  no asterixis, no tremor    Labs and Diagnostic tests:    MELD-Na score: 22 at 9/27/2021  7:58 AM  MELD score: 15 at 9/27/2021  7:58 AM  Calculated from:  Serum Creatinine: 0.77 mg/dL (Using min of 1 mg/dL) at 9/27/2021  7:58 AM  Serum Sodium: 129 mmol/L at 9/27/2021  7:58 AM  Total Bilirubin: 5.0 mg/dL at 9/27/2021  7:58 AM  INR(ratio): 1.28 at 9/27/2021  7:58 AM  Age: 56 years    Imaging:  Abd US " 8/18/20121  Findings:  Liver: Coarsened echotexture and nodular contour. Measures 12.1 cm.     Extrahepatic portal vein flow is retrograde at 9 cm/s.  Right portal vein flow is retrograde, measuring 10 cm/s.  Left portal vein flow is retrograde, measuring 16 cm/s.  Patent umbilical vein.     Flow in the hepatic artery is towards the liver and:  54 cm/s peak systolic  0.76 resistive index.      Splenic vein is retrograde. The left, middle, and right hepatic veins  are patent with flow towards the IVC. The IVC is patent with flow  towards the heart.   The visualized aorta is not dilated.     Gallbladder: Gallbladder sludge with significant wall thickening,  likely secondary to volume status.     Bile Ducts: Both the intra- and extrahepatic biliary system are of  normal caliber.  The common bile duct measures 3 mm.     Pancreas: Visualized portions of the head and body of the pancreas are  unremarkable.      Right kidney:  Right kidney with normal echotexture without  hydronephrosis or mass. Measures 12 cm.     Fluid: Small to moderate volume ascites      Procedures:  Liver biopsy: 6/4/2012   Liver, needle biopsy:   - Chronic liver disease with bridging fibrosis and architectural     distortion, rule out primary biliary cirrhosis.    EGD: 12/1/2020  - Grade II and large (> 5 mm)                        esophageal varices.                       - Z-line regular, 40 cm from the                        incisors.                       - Portal hypertensive gastropathy.                       - Gastric erosions without bleeding.                        Biopsied.                       - Normal examined duodenum.    EUS 12/1/2020  - Many abnormal lymph nodes were                        visualized in the subcarinal                        mediastinum (level 7), lower                        paraesophageal mediastinum (level                        8L), gastrohepatic ligament (level                        18), peripancreatic region,  ranjit                        hepatis region and aortocaval region.                        Fine needle aspiration performed.                        Largest lymph nodes periportal or                        aortocaval, approximately 2-3 cm.                        Portal Lymph node aspirated for                        cytology, culture, flow cytometry                       - Hyperechoic material consistent                        with sludge was visualized                        endosonographically in the mid common                        bile duct, causing upstream dilation                        of it to up to 10 mm. Small amount of                        sludge in the gallbladder.                       - Endosonographic imaging of the                        pancreas showed sonographic changes                        consistent with mild-moderate chronic                        pancreatitis. Poorly visualized                        pancreas, specially tail    Colonoscopy: 12/23/2020  Findings:       The terminal ileum appeared normal.       A diffuse area of mildly erythematous mucosa was        found in the descending colon, in the transverse        colon, at the hepatic flexure, in the ascending colon        and in the cecum. This was biopsied with a cold        forceps for histology.       Normal mucosa was found in the rectum. Biopsies were        taken with a cold forceps for histology.       Internal hemorrhoids were found during retroflexion.       No additional abnormalities were found on        retroflexion.    Assessment and Plan:    PSC related Cirrhosis:    - on weight-based ursodiol at 600 mg twice daily  - Continues to follow with advanced endo for stent exchange  - MELD labs per protocol    Ulcerative Colitis:  - Stopped mesalamine and no symptoms  - will follow for changes in symptoms    Transplant Status:  - Listed for transplant, ACTIVE  - ABO - AB  - Had recent cardiac MRI and left heart cath: all  normal.    - Has EP follow up scheduled    Cancer Risk  - Agree with screening for cholangiocarcinoma in this patient.  We would recommend the following regimen:   - YEARLY MRI/MRCP    - YEARLY abdominal ultrasound with dopplers: this should be performed every alternate 6 months between MRIs.  This can be performed locally and should be an exam of the complete abdomen, commenting upon spleen size   - Twice yearly  and AFP (at time of imaging studies)  (please note there is no specific recommendation from the AASLD or other GI professional society to support this screening regimen.)  - Continue with yearly colonoscopy    Hepatocellular Cancer Screening:   - US 8/2021 without masses  - Recommend screening for HCC every 6 months with either abdominal ultrasound or by alternating abdominal ultrasound with EITHER a triple/quad phase CT Liver with IV contrast OR a Quad phase MRI Liver with IV contrast.  AFP levels should be checked every 6 months at time of imaging screen.    Ascites:  -She has decompensated disease and has been getting paracentesis q. Weekly  -We will increase her diuretics to Lasix 40 mg QAM and 20mg QPM and spironolactone 100 mg QAM and 50mg Q PM  - Continue on ciprofloxacin  - Continue to follow a sodium restricted (<2g sodium diet)     Hepatic Encephalopathy:  -No acute issues at this time; she was educated as to the warning signs and symptoms    Esophageal Varices:   -She does have large varices on US from December 2020  - Will request advanced endoscopy band AFTER stent exchange this FRIDAY  -As she has refractory ascites requiring paracentesis, felt that benefits versus risks of ongoing beta-blockade are not warranted and will discontinue carvedilol at this time      In clinic procedures:  - Removed ND feeding tube and staple removal    Nutrition:  As with most patients with chronic liver disease, there is a significant degree of protein malnutrition.  Dicussed need to change dietary habits  to improve overall protein balance.  Discussed the importance of eating between 1.2-1.5g/kg/day lean protein like eggs, fish, chicken, nuts, and legumes, in addition to a diet rich in fresh fruits and vegetables.  Continue to follow a sodium restricted (<2g sodium diet) and discussed the need to minimize the intake of carbohydrates and sugars, to avoid obesity.   - Strongly encourage protein supplements 2-3 times daily (Boost, Ensure, [employer] Instant Milk, etc.) to meet protein and caloric intake.  - Recommend a bedtime snack with protein and complex carbohydrate to minimize risk of muscle catabolism overnight    Routine Health Care in Patient with Chronic Liver Disease:  - We recommend screening for hepatitis A and B, please vaccinate if not immune  - All patients with liver disease, particularly those with cholestatic liver disease, are at an increased risk for osteoporosis.  We strongly recommend screening for Vitamin D deficiency at least twice yearly with aggressive supplementation/replacement as indicated.    - We also recommend a screening DEXA scan to evaluate for osteoporosis.  If present, should treat with calcium, Vitamin D supplementation, and recommend consideration of bisphosphonate therapy.  Also recommend follow up DEXA scans to evaluate for improvement of bone density on therapy.  - All patients with liver disease should avoid the use of Non-steroidal Anti-Inflammatory (NSAID) medications as they can cause significant injury to the kidneys in this population    Follow Up:  2 months     Thank you very much for the opportunity to participate in the care of this patient.  If you have any further questions, please don't hesitate to contact our office.    Thomas M. Leventhal, M.D.   of Medicine  Advanced & Transplant Hepatology  [facility]    We discussed participation in the [facility] patient-related outcomes study.  The patient understands that the  study may later link survey data with clinical data.  The patient would like to be contacted to participate in the study.        Again, thank you for allowing me to participate in the care of your patient.        Sincerely,        Thomas M. Leventhal, MD

## 2021-09-28 ENCOUNTER — HOSPITAL ENCOUNTER (OUTPATIENT)
Dept: ULTRASOUND IMAGING | Facility: CLINIC | Age: 56
Discharge: HOME | End: 2021-09-28
Attending: INTERNAL MEDICINE | Admitting: INTERNAL MEDICINE
Payer: COMMERCIAL

## 2021-09-28 VITALS — RESPIRATION RATE: 18 BRPM | HEART RATE: 89 BPM

## 2021-09-28 DIAGNOSIS — K74.60 CIRRHOSIS OF LIVER WITH ASCITES, UNSPECIFIED HEPATIC CIRRHOSIS TYPE (H): ICD-10-CM

## 2021-09-28 DIAGNOSIS — R18.8 CIRRHOSIS OF LIVER WITH ASCITES, UNSPECIFIED HEPATIC CIRRHOSIS TYPE (H): ICD-10-CM

## 2021-09-28 PROCEDURE — 250N000011 HC RX IP 250 OP 636

## 2021-09-28 PROCEDURE — 250N000009 HC RX 250: Performed by: RADIOLOGY

## 2021-09-28 PROCEDURE — 272N000706 US PARACENTESIS

## 2021-09-28 PROCEDURE — P9047 ALBUMIN (HUMAN), 25%, 50ML: HCPCS

## 2021-09-28 PROCEDURE — 49083 ABD PARACENTESIS W/IMAGING: CPT

## 2021-09-28 RX ORDER — ALBUMIN (HUMAN) 12.5 G/50ML
SOLUTION INTRAVENOUS
Status: COMPLETED
Start: 2021-09-28 | End: 2021-09-28

## 2021-09-28 RX ORDER — ALBUMIN (HUMAN) 12.5 G/50ML
50 SOLUTION INTRAVENOUS ONCE
Status: COMPLETED | OUTPATIENT
Start: 2021-09-28 | End: 2021-09-28

## 2021-09-28 RX ORDER — ALBUMIN (HUMAN) 12.5 G/50ML
12.5 SOLUTION INTRAVENOUS ONCE
Status: CANCELLED | OUTPATIENT
Start: 2021-09-28 | End: 2021-09-28

## 2021-09-28 RX ADMIN — ALBUMIN (HUMAN) 50 G: 12.5 SOLUTION INTRAVENOUS at 08:44

## 2021-09-28 RX ADMIN — ALBUMIN HUMAN 50 G: 0.25 SOLUTION INTRAVENOUS at 08:44

## 2021-09-28 RX ADMIN — LIDOCAINE HYDROCHLORIDE 10 ML: 10 INJECTION, SOLUTION EPIDURAL; INFILTRATION; INTRACAUDAL; PERINEURAL at 08:37

## 2021-09-28 NOTE — IR NOTE
RADIOLOGY POST PROCEDURE NOTE    Patient name: Berta Nava  MRN: [id number]  : 1965    Pre-procedure diagnosis: Recurrent ascites  Post-procedure diagnosis: Same    Procedure Date/Time: 2021  9:49 AM  Procedure: US guided paracentesis, lower abdominal midline.  Estimated blood loss: 2 ml  Specimen(s) collected with description: Please see above.    The patient tolerated the procedure well with no immediate complications.  Significant findings:  None    See imaging dictation for procedural details.    Provider name: Scot Barrientos MD  Assistant(s):None

## 2021-09-28 NOTE — PROGRESS NOTES
Paracentesis complete.  Consent obtained with Dr. Barrientos.  Pt tolerated well, drained 5000 mLs c;ear a,aydee colored fluid.  Site at midline.  Site covered with a sterile bandaid.  Site CDI.  Albumin 25 grams. VSS.  No complications noted.  Reviewed discharge instructions with pt.  Pt stable on discharge.

## 2021-09-29 DIAGNOSIS — Z11.59 ENCOUNTER FOR SCREENING FOR OTHER VIRAL DISEASES: ICD-10-CM

## 2021-09-30 ENCOUNTER — PREP FOR PROCEDURE (OUTPATIENT)
Dept: GASTROENTEROLOGY | Facility: CLINIC | Age: 56
End: 2021-09-30

## 2021-09-30 ENCOUNTER — ANESTHESIA EVENT (OUTPATIENT)
Dept: SURGERY | Facility: CLINIC | Age: 56
End: 2021-09-30
Payer: COMMERCIAL

## 2021-09-30 ASSESSMENT — ENCOUNTER SYMPTOMS: DYSRHYTHMIAS: 1

## 2021-09-30 NOTE — ANESTHESIA PREPROCEDURE EVALUATION
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]   Procedure : Procedure(s):  ENDOSCOPIC RETROGRADE CHOLANGIOPANCREATOGRAPHY     Past Medical History:   Diagnosis Date     Ascites      Biliary cirrhosis (H)      Cholangitis, sclerosing      Cirrhosis of liver with ascites (H) 3/3/2021     Hearing loss of left ear     wears a hearing aide     History of low potassium      Hyperlipidemia      Hypertension      SBP (spontaneous bacterial peritonitis) (H) 2021     Sjogren's syndrome (H)      Ulcerative colitis (H)      Ulcerative pancolitis (H)       Past Surgical History:   Procedure Laterality Date     CV CORONARY ANGIOGRAM N/A 2021    Procedure: Coronary Angiogram with possible intervention;  Surgeon: David Wilhelm MD;  Location:  [facility]     ENDOSCOPIC RETROGRADE CHOLANGIOPANCREATOGRAM N/A 2021    Procedure: ENDOSCOPIC RETROGRADE CHOLANGIOPANCREATOGRAPHY with ballon sweep of bile ducts for stones, balloon dilation of bile ducts and bile duct stent placement;  Surgeon: Gregory Gabriel MD;  Location:  OR     ENDOSCOPIC RETROGRADE CHOLANGIOPANCREATOGRAM N/A 2021    Procedure: ENDOSCOPIC RETROGRADE CHOLANGIOPANCREATOGRAPHY STONE REMOVAL, DILATION AND STENT PLACEMENT;  Surgeon: Gregory Gabriel MD;  Location:  OR     ENDOSCOPIC RETROGRADE CHOLANGIOPANCREATOGRAPHY, EXCHANGE TUBE/STENT N/A 2021    Procedure: ENDOSCOPIC RETROGRADE CHOLANGIOPANCREATOGRAPHY WITH STENT EXCHANGE, STONE EXTRACTION, AND DILATION;  Surgeon: Gregory Gabriel MD;  Location:  OR     ENDOSCOPIC RETROGRADE CHOLANGIOPANCREATOGRAPHY, EXCHANGE TUBE/STENT N/A 5/10/2021    Procedure: ENDOSCOPIC RETROGRADE CHOLANGIOPANCREATOGRAPHY with biliary dilation, stone removal, stent exchange;  Surgeon: Gregory Gabriel MD;  Location:  OR     ENDOSCOPIC RETROGRADE CHOLANGIOPANCREATOGRAPHY, EXCHANGE  TUBE/STENT N/A 6/10/2021    Procedure: ENDOSCOPIC RETROGRADE CHOLANGIOPANCREATOGRAPHY, WITH biliary stent exchange, stone removal;  Surgeon: Woodrow Lott MD;  Location: [facility]     ENDOSCOPIC RETROGRADE CHOLANGIOPANCREATOGRAPHY, EXCHANGE TUBE/STENT N/A 2021    Procedure: ENDOSCOPIC RETROGRADE CHOLANGIOPANCREATOGRAPHY with biliary dilation, stone removal, stent exchange;  Surgeon: Gregory Gabriel MD;  Location: [facility] OR     ESOPHAGOSCOPY, GASTROSCOPY, DUODENOSCOPY (EGD), COMBINED N/A 2021    Procedure: ESOPHAGOGASTRODUODENOSCOPY (EGD);  Surgeon: Leventhal, Thomas Michael, MD;  Location: [facility] GI     GYN SURGERY      bilat fallopian tubes and ovaries removed     PICC DOUBLE LUMEN PLACEMENT Right 2021    42cm (2cm external), Lateral brachial vein     TRANSPLANT LIVER RECIPIENT  DONOR N/A 2021    Procedure: Opening of abdomen, Abdominal Exploration and aborted liver transplant.;  Surgeon: Ernesto Schmitt MD;  Location: [facility] OR      Allergies   Allergen Reactions     Diagnostic X-Ray Materials Hives     PATIENT HAD HIVE REACTION AFTER ADMINISTERING CT CONTRAST DYE.       Contrast Dye       Social History     Tobacco Use     Smoking status: Never Smoker     Smokeless tobacco: Never Used   Substance Use Topics     Alcohol use: No     Comment: Last drink was 2017      Wt Readings from Last 1 Encounters:   21 63.8 kg (140 lb 11.2 oz)        Anesthesia Evaluation   Pt has had prior anesthetic. Type: General and MAC.    No history of anesthetic complications       ROS/MED HX  ENT/Pulmonary:  - neg pulmonary ROS   (+) sleep apnea,     Neurologic:  - neg neurologic ROS     Cardiovascular:     (+) Dyslipidemia hypertension-----dysrhythmias (21 developed unstable Vtach vs SVT during liver transplant - shocked 5 times before return of NSR), Previous cardiac testing   Echo: Date: 21 Results:  Global and regional left ventricular function is normal with an EF of 60-65%.  Global right  ventricular function is normal. The right ventricle is normal  size.Global and regional left ventricular function is normal with an EF of 60-65%.  Global right ventricular function is normal. The right ventricle is normal  size.  No significant valvular abnormalities.  IVC diameter <2.1 cm collapsing >50% with sniff suggests a normal RA pressure  of 3 mmHg.  Ascites is noted.  This study was compared with the study from 04/20/2021 (resting tomograms from  stress study). No significant changes noted.  No significant valvular abnormalities.  IVC diameter <2.1 cm collapsing >50% with sniff suggests a normal RA pressure  of 3 mmHg.  Ascites is noted.  This study was compared with the study from 04/20/2021 (resting tomograms from  stress study). No significant changes noted.  Stress Test: Date: Results:    ECG Reviewed: Date: Results:    Cath: Date: 8/25/21 Results:  Left Main  The vessel was visualized by selective angiography. There was 0% vessel disease.  Left Anterior Descending  The vessel was visualized by selective angiography. There was 0% vessel disease.  Left Circumflex  The vessel was visualized by selective angiography. There was 0% vessel disease.  Right Coronary Artery  The vessel was visualized by selective angiography. There was 0% vessel disease.        METS/Exercise Tolerance: 1 - Eating, dressing    Hematologic:  - neg hematologic  ROS     Musculoskeletal:  - neg musculoskeletal ROS     GI/Hepatic: Comment: Primary Sclerosing Cholangitis, Ulcerative pancolitis, ascites/ cirrhosis. Esophageal varices without bleeding.  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  - neg infectious disease ROS     Malignancy:  - neg malignancy ROS     Other:            Physical Exam    Airway        Mallampati: I   TM distance: > 3 FB   Neck ROM: full   Mouth opening: > 3 cm    Respiratory Devices and Support         Dental  no notable dental history         Cardiovascular   cardiovascular exam normal          Pulmonary   pulmonary exam normal                OUTSIDE LABS:  CBC:   Lab Results   Component Value Date    WBC 13.0 (H) 09/16/2021    WBC 14.1 (H) 09/09/2021    HGB 8.8 (L) 09/16/2021    HGB 8.1 (L) 09/09/2021    HCT 26.5 (L) 09/16/2021    HCT 24.8 (L) 09/09/2021     09/16/2021     09/09/2021     BMP:   Lab Results   Component Value Date     (L) 09/27/2021     (L) 09/16/2021    POTASSIUM 4.8 09/16/2021    POTASSIUM 4.1 09/09/2021    CHLORIDE 95 09/16/2021    CHLORIDE 102 09/09/2021    CO2 24 09/16/2021    CO2 20 09/09/2021    BUN 45 (H) 09/16/2021    BUN 31 (H) 09/09/2021    CR 0.77 09/27/2021    CR 0.85 09/16/2021    GLC 98 09/16/2021     (H) 09/09/2021     COAGS:   Lab Results   Component Value Date    PTT 30 08/18/2021    INR 1.28 (H) 09/27/2021    FIBR 277 09/04/2021     POC:   Lab Results   Component Value Date    BGM 95 06/25/2021     HEPATIC:   Lab Results   Component Value Date    ALBUMIN 2.5 (L) 09/27/2021    PROTTOTAL 7.2 09/16/2021    ALT 38 09/16/2021    AST 62 (H) 09/16/2021    ALKPHOS 296 (H) 09/16/2021    BILITOTAL 5.0 (H) 09/27/2021    MIHIR 48 06/09/2021     OTHER:   Lab Results   Component Value Date    PH 7.38 08/22/2021    LACT 1.6 09/04/2021    A1C 4.3 04/29/2021    MARIELENA 9.4 09/16/2021    PHOS 2.8 09/09/2021    MAG 2.1 09/16/2021    LIPASE 241 06/25/2021    AMYLASE 68 08/18/2021    CRP 40.0 (H) 08/19/2021       Anesthesia Plan    ASA Status:  3      Anesthesia Type: General.     - Airway: ETT   Induction: Intravenous.   Maintenance: Balanced.        Consents            Postoperative Care    Pain management: IV analgesics,  Oral pain medications.   PONV prophylaxis: Ondansetron (or other 5HT-3), Dexamethasone or Solumedrol     Comments:                Jim Ortiz

## 2021-10-01 ENCOUNTER — HOSPITAL ENCOUNTER (OUTPATIENT)
Facility: CLINIC | Age: 56
Discharge: HOME | End: 2021-10-01
Attending: INTERNAL MEDICINE | Admitting: INTERNAL MEDICINE
Payer: COMMERCIAL

## 2021-10-01 ENCOUNTER — APPOINTMENT (OUTPATIENT)
Dept: GENERAL RADIOLOGY | Facility: CLINIC | Age: 56
End: 2021-10-01
Attending: STUDENT IN AN ORGANIZED HEALTH CARE EDUCATION/TRAINING PROGRAM
Payer: COMMERCIAL

## 2021-10-01 ENCOUNTER — ANESTHESIA (OUTPATIENT)
Dept: SURGERY | Facility: CLINIC | Age: 56
End: 2021-10-01
Payer: COMMERCIAL

## 2021-10-01 ENCOUNTER — APPOINTMENT (OUTPATIENT)
Dept: GENERAL RADIOLOGY | Facility: CLINIC | Age: 56
End: 2021-10-01
Attending: INTERNAL MEDICINE
Payer: COMMERCIAL

## 2021-10-01 ENCOUNTER — PREP FOR PROCEDURE (OUTPATIENT)
Dept: GASTROENTEROLOGY | Facility: CLINIC | Age: 56
End: 2021-10-01

## 2021-10-01 VITALS
SYSTOLIC BLOOD PRESSURE: 127 MMHG | RESPIRATION RATE: 16 BRPM | HEIGHT: 65 IN | DIASTOLIC BLOOD PRESSURE: 81 MMHG | WEIGHT: 138.01 LBS | BODY MASS INDEX: 22.99 KG/M2 | HEART RATE: 91 BPM | OXYGEN SATURATION: 99 % | TEMPERATURE: 98 F

## 2021-10-01 DIAGNOSIS — I85.00 ESOPHAGEAL VARICES (H): ICD-10-CM

## 2021-10-01 DIAGNOSIS — K83.01 PRIMARY SCLEROSING CHOLANGITIS (H): ICD-10-CM

## 2021-10-01 DIAGNOSIS — K83.01 PRIMARY SCLEROSING CHOLANGITIS (H): Primary | ICD-10-CM

## 2021-10-01 DIAGNOSIS — I85.10 SECONDARY ESOPHAGEAL VARICES WITHOUT BLEEDING (H): Primary | ICD-10-CM

## 2021-10-01 LAB
ALBUMIN SERPL-MCNC: 2.4 G/DL (ref 3.4–5)
ALP SERPL-CCNC: 323 U/L (ref 40–150)
ALT SERPL W P-5'-P-CCNC: 36 U/L (ref 0–50)
AMYLASE SERPL-CCNC: 90 U/L (ref 30–110)
ANION GAP SERPL CALCULATED.3IONS-SCNC: 12 MMOL/L (ref 3–14)
AST SERPL W P-5'-P-CCNC: 52 U/L (ref 0–45)
BILIRUB SERPL-MCNC: 4 MG/DL (ref 0.2–1.3)
BUN SERPL-MCNC: 40 MG/DL (ref 7–30)
CALCIUM SERPL-MCNC: 9.2 MG/DL (ref 8.5–10.1)
CHLORIDE BLD-SCNC: 99 MMOL/L (ref 94–109)
CO2 SERPL-SCNC: 20 MMOL/L (ref 20–32)
CREAT SERPL-MCNC: 0.76 MG/DL (ref 0.52–1.04)
ERCP: NORMAL
ERYTHROCYTE [DISTWIDTH] IN BLOOD BY AUTOMATED COUNT: 16.3 % (ref 10–15)
GFR SERPL CREATININE-BSD FRML MDRD: 87 ML/MIN/1.73M2
GLUCOSE BLD-MCNC: 91 MG/DL (ref 70–99)
GLUCOSE BLDC GLUCOMTR-MCNC: 90 MG/DL (ref 70–99)
HCT VFR BLD AUTO: 26.9 % (ref 35–47)
HGB BLD-MCNC: 9.2 G/DL (ref 11.7–15.7)
INR PPP: 1.32 (ref 0.85–1.15)
LIPASE SERPL-CCNC: 517 U/L (ref 73–393)
MCH RBC QN AUTO: 32.1 PG (ref 26.5–33)
MCHC RBC AUTO-ENTMCNC: 34.2 G/DL (ref 31.5–36.5)
MCV RBC AUTO: 94 FL (ref 78–100)
PLATELET # BLD AUTO: 247 10E3/UL (ref 150–450)
POTASSIUM BLD-SCNC: 3.5 MMOL/L (ref 3.4–5.3)
PROT SERPL-MCNC: 7.1 G/DL (ref 6.8–8.8)
RBC # BLD AUTO: 2.87 10E6/UL (ref 3.8–5.2)
SODIUM SERPL-SCNC: 131 MMOL/L (ref 133–144)
WBC # BLD AUTO: 13.4 10E3/UL (ref 4–11)

## 2021-10-01 PROCEDURE — 85610 PROTHROMBIN TIME: CPT | Performed by: INTERNAL MEDICINE

## 2021-10-01 PROCEDURE — 250N000011 HC RX IP 250 OP 636

## 2021-10-01 PROCEDURE — 258N000003 HC RX IP 258 OP 636

## 2021-10-01 PROCEDURE — 82150 ASSAY OF AMYLASE: CPT | Performed by: INTERNAL MEDICINE

## 2021-10-01 PROCEDURE — 36415 COLL VENOUS BLD VENIPUNCTURE: CPT | Performed by: INTERNAL MEDICINE

## 2021-10-01 PROCEDURE — 71045 X-RAY EXAM CHEST 1 VIEW: CPT

## 2021-10-01 PROCEDURE — 82962 GLUCOSE BLOOD TEST: CPT

## 2021-10-01 PROCEDURE — 250N000025 HC SEVOFLURANE, PER MIN: Performed by: INTERNAL MEDICINE

## 2021-10-01 PROCEDURE — 999N000181 XR SURGERY CARM FLUORO GREATER THAN 5 MIN W STILLS: Mod: TC

## 2021-10-01 PROCEDURE — 250N000009 HC RX 250

## 2021-10-01 PROCEDURE — C1769 GUIDE WIRE: HCPCS | Performed by: INTERNAL MEDICINE

## 2021-10-01 PROCEDURE — 83690 ASSAY OF LIPASE: CPT | Performed by: INTERNAL MEDICINE

## 2021-10-01 PROCEDURE — 360N000082 HC SURGERY LEVEL 2 W/ FLUORO, PER MIN: Performed by: INTERNAL MEDICINE

## 2021-10-01 PROCEDURE — 710N000010 HC RECOVERY PHASE 1, LEVEL 2, PER MIN: Performed by: INTERNAL MEDICINE

## 2021-10-01 PROCEDURE — 255N000002 HC RX 255 OP 636: Performed by: INTERNAL MEDICINE

## 2021-10-01 PROCEDURE — 250N000013 HC RX MED GY IP 250 OP 250 PS 637

## 2021-10-01 PROCEDURE — C1726 CATH, BAL DIL, NON-VASCULAR: HCPCS | Performed by: INTERNAL MEDICINE

## 2021-10-01 PROCEDURE — 710N000012 HC RECOVERY PHASE 2, PER MINUTE: Performed by: INTERNAL MEDICINE

## 2021-10-01 PROCEDURE — 80053 COMPREHEN METABOLIC PANEL: CPT | Performed by: INTERNAL MEDICINE

## 2021-10-01 PROCEDURE — 272N000001 HC OR GENERAL SUPPLY STERILE: Performed by: INTERNAL MEDICINE

## 2021-10-01 PROCEDURE — 360N000083 HC SURGERY LEVEL 3 W/ FLUORO, PER MIN: Performed by: INTERNAL MEDICINE

## 2021-10-01 PROCEDURE — C1877 STENT, NON-COAT/COV W/O DEL: HCPCS | Performed by: INTERNAL MEDICINE

## 2021-10-01 PROCEDURE — 250N000009 HC RX 250: Performed by: INTERNAL MEDICINE

## 2021-10-01 PROCEDURE — 85027 COMPLETE CBC AUTOMATED: CPT | Performed by: INTERNAL MEDICINE

## 2021-10-01 PROCEDURE — 71045 X-RAY EXAM CHEST 1 VIEW: CPT | Mod: 26 | Performed by: RADIOLOGY

## 2021-10-01 PROCEDURE — 999N000141 HC STATISTIC PRE-PROCEDURE NURSING ASSESSMENT: Performed by: INTERNAL MEDICINE

## 2021-10-01 PROCEDURE — 370N000017 HC ANESTHESIA TECHNICAL FEE, PER MIN: Performed by: INTERNAL MEDICINE

## 2021-10-01 DEVICE — STENT FREEMAN PANCREA FLEX 4FRX11CM W/O FLANGE SGL PIGTAIL: Type: IMPLANTABLE DEVICE | Site: BILE DUCT | Status: FUNCTIONAL

## 2021-10-01 RX ORDER — SUCRALFATE ORAL 1 G/10ML
1 SUSPENSION ORAL 4 TIMES DAILY
Qty: 10 ML | Refills: 1 | Status: SHIPPED | OUTPATIENT
Start: 2021-10-01 | End: 2021-11-01

## 2021-10-01 RX ORDER — ONDANSETRON 2 MG/ML
4 INJECTION INTRAMUSCULAR; INTRAVENOUS EVERY 6 HOURS PRN
Status: DISCONTINUED | OUTPATIENT
Start: 2021-10-01 | End: 2021-10-01 | Stop reason: HOSPADM

## 2021-10-01 RX ORDER — ONDANSETRON 2 MG/ML
INJECTION INTRAMUSCULAR; INTRAVENOUS PRN
Status: DISCONTINUED | OUTPATIENT
Start: 2021-10-01 | End: 2021-10-01

## 2021-10-01 RX ORDER — HYDROMORPHONE HCL IN WATER/PF 6 MG/30 ML
0.2 PATIENT CONTROLLED ANALGESIA SYRINGE INTRAVENOUS EVERY 5 MIN PRN
Status: DISCONTINUED | OUTPATIENT
Start: 2021-10-01 | End: 2021-10-01 | Stop reason: HOSPADM

## 2021-10-01 RX ORDER — PROPOFOL 10 MG/ML
INJECTION, EMULSION INTRAVENOUS PRN
Status: DISCONTINUED | OUTPATIENT
Start: 2021-10-01 | End: 2021-10-01

## 2021-10-01 RX ORDER — LABETALOL HYDROCHLORIDE 5 MG/ML
10 INJECTION, SOLUTION INTRAVENOUS
Status: DISCONTINUED | OUTPATIENT
Start: 2021-10-01 | End: 2021-10-01 | Stop reason: HOSPADM

## 2021-10-01 RX ORDER — INDOMETHACIN 50 MG/1
SUPPOSITORY RECTAL PRN
Status: DISCONTINUED | OUTPATIENT
Start: 2021-10-01 | End: 2021-10-01 | Stop reason: HOSPADM

## 2021-10-01 RX ORDER — LEVOFLOXACIN 5 MG/ML
INJECTION, SOLUTION INTRAVENOUS PRN
Status: DISCONTINUED | OUTPATIENT
Start: 2021-10-01 | End: 2021-10-01

## 2021-10-01 RX ORDER — NALOXONE HYDROCHLORIDE 0.4 MG/ML
0.2 INJECTION, SOLUTION INTRAMUSCULAR; INTRAVENOUS; SUBCUTANEOUS
Status: DISCONTINUED | OUTPATIENT
Start: 2021-10-01 | End: 2021-10-01 | Stop reason: HOSPADM

## 2021-10-01 RX ORDER — SODIUM CHLORIDE, SODIUM LACTATE, POTASSIUM CHLORIDE, CALCIUM CHLORIDE 600; 310; 30; 20 MG/100ML; MG/100ML; MG/100ML; MG/100ML
INJECTION, SOLUTION INTRAVENOUS CONTINUOUS PRN
Status: DISCONTINUED | OUTPATIENT
Start: 2021-10-01 | End: 2021-10-01

## 2021-10-01 RX ORDER — ONDANSETRON 4 MG/1
4 TABLET, ORALLY DISINTEGRATING ORAL EVERY 6 HOURS PRN
Status: DISCONTINUED | OUTPATIENT
Start: 2021-10-01 | End: 2021-10-01 | Stop reason: HOSPADM

## 2021-10-01 RX ORDER — INDOMETHACIN 50 MG/1
100 SUPPOSITORY RECTAL
Status: DISCONTINUED | OUTPATIENT
Start: 2021-10-01 | End: 2021-10-01 | Stop reason: HOSPADM

## 2021-10-01 RX ORDER — ONDANSETRON 4 MG/1
4 TABLET, ORALLY DISINTEGRATING ORAL EVERY 30 MIN PRN
Status: DISCONTINUED | OUTPATIENT
Start: 2021-10-01 | End: 2021-10-01 | Stop reason: HOSPADM

## 2021-10-01 RX ORDER — NALOXONE HYDROCHLORIDE 0.4 MG/ML
0.4 INJECTION, SOLUTION INTRAMUSCULAR; INTRAVENOUS; SUBCUTANEOUS
Status: DISCONTINUED | OUTPATIENT
Start: 2021-10-01 | End: 2021-10-01 | Stop reason: HOSPADM

## 2021-10-01 RX ORDER — IOPAMIDOL 510 MG/ML
INJECTION, SOLUTION INTRAVASCULAR PRN
Status: DISCONTINUED | OUTPATIENT
Start: 2021-10-01 | End: 2021-10-01 | Stop reason: HOSPADM

## 2021-10-01 RX ORDER — OXYCODONE HYDROCHLORIDE 5 MG/1
5 TABLET ORAL EVERY 4 HOURS PRN
Status: DISCONTINUED | OUTPATIENT
Start: 2021-10-01 | End: 2021-10-01 | Stop reason: HOSPADM

## 2021-10-01 RX ORDER — SODIUM CHLORIDE, SODIUM LACTATE, POTASSIUM CHLORIDE, CALCIUM CHLORIDE 600; 310; 30; 20 MG/100ML; MG/100ML; MG/100ML; MG/100ML
INJECTION, SOLUTION INTRAVENOUS CONTINUOUS
Status: DISCONTINUED | OUTPATIENT
Start: 2021-10-01 | End: 2021-10-01 | Stop reason: HOSPADM

## 2021-10-01 RX ORDER — LIDOCAINE HYDROCHLORIDE 20 MG/ML
INJECTION, SOLUTION INFILTRATION; PERINEURAL PRN
Status: DISCONTINUED | OUTPATIENT
Start: 2021-10-01 | End: 2021-10-01

## 2021-10-01 RX ORDER — DEXAMETHASONE SODIUM PHOSPHATE 4 MG/ML
INJECTION, SOLUTION INTRA-ARTICULAR; INTRALESIONAL; INTRAMUSCULAR; INTRAVENOUS; SOFT TISSUE PRN
Status: DISCONTINUED | OUTPATIENT
Start: 2021-10-01 | End: 2021-10-01

## 2021-10-01 RX ORDER — FENTANYL CITRATE 50 UG/ML
50 INJECTION, SOLUTION INTRAMUSCULAR; INTRAVENOUS EVERY 5 MIN PRN
Status: DISCONTINUED | OUTPATIENT
Start: 2021-10-01 | End: 2021-10-01 | Stop reason: HOSPADM

## 2021-10-01 RX ORDER — LIDOCAINE 40 MG/G
CREAM TOPICAL
Status: DISCONTINUED | OUTPATIENT
Start: 2021-10-01 | End: 2021-10-01 | Stop reason: HOSPADM

## 2021-10-01 RX ORDER — FENTANYL CITRATE 50 UG/ML
INJECTION, SOLUTION INTRAMUSCULAR; INTRAVENOUS PRN
Status: DISCONTINUED | OUTPATIENT
Start: 2021-10-01 | End: 2021-10-01

## 2021-10-01 RX ORDER — ALBUTEROL SULFATE 90 UG/1
AEROSOL, METERED RESPIRATORY (INHALATION) PRN
Status: DISCONTINUED | OUTPATIENT
Start: 2021-10-01 | End: 2021-10-01

## 2021-10-01 RX ORDER — ONDANSETRON 2 MG/ML
4 INJECTION INTRAMUSCULAR; INTRAVENOUS EVERY 30 MIN PRN
Status: DISCONTINUED | OUTPATIENT
Start: 2021-10-01 | End: 2021-10-01 | Stop reason: HOSPADM

## 2021-10-01 RX ORDER — FLUMAZENIL 0.1 MG/ML
0.2 INJECTION, SOLUTION INTRAVENOUS
Status: DISCONTINUED | OUTPATIENT
Start: 2021-10-01 | End: 2021-10-01 | Stop reason: HOSPADM

## 2021-10-01 RX ADMIN — LEVOFLOXACIN 500 MG: 5 INJECTION, SOLUTION INTRAVENOUS at 07:05

## 2021-10-01 RX ADMIN — ONDANSETRON 4 MG: 2 INJECTION INTRAMUSCULAR; INTRAVENOUS at 10:54

## 2021-10-01 RX ADMIN — PROPOFOL 100 MG: 10 INJECTION, EMULSION INTRAVENOUS at 07:49

## 2021-10-01 RX ADMIN — FENTANYL CITRATE 25 MCG: 50 INJECTION, SOLUTION INTRAMUSCULAR; INTRAVENOUS at 07:48

## 2021-10-01 RX ADMIN — LIDOCAINE HYDROCHLORIDE 60 MG: 20 INJECTION, SOLUTION INFILTRATION; PERINEURAL at 07:48

## 2021-10-01 RX ADMIN — ALBUTEROL SULFATE 2 PUFF: 108 INHALANT RESPIRATORY (INHALATION) at 08:32

## 2021-10-01 RX ADMIN — DEXAMETHASONE SODIUM PHOSPHATE 4 MG: 4 INJECTION, SOLUTION INTRA-ARTICULAR; INTRALESIONAL; INTRAMUSCULAR; INTRAVENOUS; SOFT TISSUE at 07:55

## 2021-10-01 RX ADMIN — ONDANSETRON 4 MG: 2 INJECTION INTRAMUSCULAR; INTRAVENOUS at 09:18

## 2021-10-01 RX ADMIN — SUGAMMADEX 150 MG: 100 INJECTION, SOLUTION INTRAVENOUS at 09:28

## 2021-10-01 RX ADMIN — ROCURONIUM BROMIDE 30 MG: 10 INJECTION INTRAVENOUS at 07:49

## 2021-10-01 RX ADMIN — PROPOFOL 30 MG: 10 INJECTION, EMULSION INTRAVENOUS at 07:54

## 2021-10-01 RX ADMIN — SODIUM CHLORIDE, POTASSIUM CHLORIDE, SODIUM LACTATE AND CALCIUM CHLORIDE: 600; 310; 30; 20 INJECTION, SOLUTION INTRAVENOUS at 07:05

## 2021-10-01 RX ADMIN — ALBUTEROL SULFATE 2 PUFF: 108 INHALANT RESPIRATORY (INHALATION) at 08:05

## 2021-10-01 RX ADMIN — ROCURONIUM BROMIDE 20 MG: 10 INJECTION INTRAVENOUS at 08:09

## 2021-10-01 RX ADMIN — FENTANYL CITRATE 25 MCG: 50 INJECTION, SOLUTION INTRAMUSCULAR; INTRAVENOUS at 09:00

## 2021-10-01 RX ADMIN — FENTANYL CITRATE 50 MCG: 50 INJECTION, SOLUTION INTRAMUSCULAR; INTRAVENOUS at 08:25

## 2021-10-01 ASSESSMENT — MIFFLIN-ST. JEOR: SCORE: 1206.88

## 2021-10-01 NOTE — ANESTHESIA POSTPROCEDURE EVALUATION
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    Anesthesia Type:  General    Note:  Disposition: Outpatient   Postop Pain Control: Uneventful            Sign Out: Well controlled pain   PONV: No   Neuro/Psych: Uneventful            Sign Out: Acceptable/Baseline neuro status   Airway/Respiratory: Uneventful            Sign Out: Acceptable/Baseline resp. status   CV/Hemodynamics: Uneventful            Sign Out: Acceptable CV status; No obvious hypovolemia; No obvious fluid overload   Other NRE: NONE   DID A NON-ROUTINE EVENT OCCUR? No    Event details/Postop Comments:  Pt desated to low 80s. Found to be main stemmed. PACU x-ray unremarkable. Sats back to high 90s on RA in PACU           Last vitals:  Vitals Value Taken Time   /51 10/01/21 1020   Temp 36.2  C (97.1  F) 10/01/21 0945   Pulse 89 10/01/21 1023   Resp 16 10/01/21 1000   SpO2 95 % 10/01/21 1023   Vitals shown include unvalidated device data.    Electronically Signed By: Caprice Pollard MD  October 1, 2021  10:24 AM

## 2021-10-01 NOTE — DISCHARGE INSTRUCTIONS
[facility]  Same-Day Surgery   Adult Discharge Orders & Instructions     Ice chips only until 1:30pm today. Then liquids until tomorrow.   For 24 hours after surgery    1. Get plenty of rest.  A responsible adult must stay with you for at least 24 hours after you leave the hospital.   2. Do not drive or use heavy equipment.  If you have weakness or tingling, don't drive or use heavy equipment until this feeling goes away.  3. Do not drink alcohol.  4. Avoid strenuous or risky activities.  Ask for help when climbing stairs.   5. You may feel lightheaded.  IF so, sit for a few minutes before standing.  Have someone help you get up.   6. If you have nausea (feel sick to your stomach): Drink only clear liquids such as apple juice, ginger ale, broth or 7-Up.  Rest may also help.  Be sure to drink enough fluids. You may have a slight fever. Call the doctor if your fever is over 100 F (37.7 C) (taken under the tongue) or lasts longer than 24 hours.  7. You may have a dry mouth, a sore throat, muscle aches or trouble sleeping.  These should go away after 24 hours.  8. Do not make important or legal decisions.   Call your doctor for any of the followin.  Signs of infection (fever, growing tenderness at the surgery site, a large amount of drainage or bleeding, severe pain, foul-smelling drainage, redness, swelling).    2. It has been over 8 to 10 hours since surgery and you are still not able to urinate (pass water).    3.  Headache for over 24 hours.    4.  Numbness, tingling or weakness the day after surgery (if you had spinal anesthesia).  To contact a doctor, call ___Dr Gabriel  at [phone] (General Surgery clinic)___________ or:        [phone] and ask for the resident on call for   ____________Gastroenterology _ (answered 24 hours a day)      Emergency Department:    [facility]: [phone]       (TTY for hearing impaired: [phone])

## 2021-10-01 NOTE — ANESTHESIA CARE TRANSFER NOTE
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    Anesthesia Type:   General     Note:    Oropharynx: oropharynx clear of all foreign objects and spontaneously breathing  Level of Consciousness: awake and drowsy  Oxygen Supplementation: face mask  Level of Supplemental Oxygen (L/min / FiO2): 6  Independent Airway: airway patency satisfactory and stable  Dentition: dentition unchanged  Vital Signs Stable: post-procedure vital signs reviewed and stable  Report to RN Given: handoff report given  Patient transferred to: PACU    Handoff Report: Identifed the Patient, Identified the Reponsible Provider, Reviewed the pertinent medical history, Discussed the surgical course, Reviewed Intra-OP anesthesia mangement and issues during anesthesia, Set expectations for post-procedure period and Allowed opportunity for questions and acknowledgement of understanding      Vitals:  Vitals Value Taken Time   /59 0950   Temp     Pulse 90 0950   Resp 12 0950   SpO2 95%09 0950       Electronically Signed By: Jim Ortiz  October 1, 2021  9:51 AM

## 2021-10-01 NOTE — ANESTHESIA PROCEDURE NOTES
Airway       Patient location during procedure: OR       Procedure Start/Stop Times: 10/1/2021 7:53 AM  Staff -        Anesthesiologist:  Caprice Pollard MD       Resident/Fellow: Jim Ortiz       Performed By: residentIndications and Patient Condition       Indications for airway management: gerardo-procedural       Induction type:intravenous       Mask difficulty assessment: 1 - vent by mask    Final Airway Details       Final airway type: endotracheal airway       Successful airway: ETT - single  Endotracheal Airway Details        ETT size (mm): 6.5       Cuffed: yes       Successful intubation technique: direct laryngoscopy       DL Blade Type: MAC 3       Grade View of Cords: 1       Adjucts: stylet       Position: Center       Measured from: gums/teeth       Secured at (cm): 19       Bite block used: None    Post intubation assessment        Placement verified by: capnometry, equal breath sounds and chest rise        Number of attempts at approach: 1       Number of other approaches attempted: 0       Secured with: commercial tube brunson       Ease of procedure: easy       Dentition: Intact    Additional Comments       Initially secured at 22 cm, noted to be mainstemed on fluoroscopy. Pulled back to 19 cm.

## 2021-10-05 ENCOUNTER — HOSPITAL ENCOUNTER (OUTPATIENT)
Dept: ULTRASOUND IMAGING | Facility: CLINIC | Age: 56
Discharge: HOME | End: 2021-10-05
Attending: INTERNAL MEDICINE | Admitting: INTERNAL MEDICINE
Payer: COMMERCIAL

## 2021-10-05 VITALS
SYSTOLIC BLOOD PRESSURE: 102 MMHG | DIASTOLIC BLOOD PRESSURE: 65 MMHG | OXYGEN SATURATION: 100 % | HEART RATE: 74 BPM | RESPIRATION RATE: 16 BRPM

## 2021-10-05 DIAGNOSIS — K74.60 CIRRHOSIS OF LIVER WITH ASCITES, UNSPECIFIED HEPATIC CIRRHOSIS TYPE (H): ICD-10-CM

## 2021-10-05 DIAGNOSIS — R18.8 CIRRHOSIS OF LIVER WITH ASCITES, UNSPECIFIED HEPATIC CIRRHOSIS TYPE (H): ICD-10-CM

## 2021-10-05 PROCEDURE — 250N000009 HC RX 250: Performed by: RADIOLOGY

## 2021-10-05 PROCEDURE — P9047 ALBUMIN (HUMAN), 25%, 50ML: HCPCS

## 2021-10-05 PROCEDURE — 250N000011 HC RX IP 250 OP 636

## 2021-10-05 PROCEDURE — 49083 ABD PARACENTESIS W/IMAGING: CPT

## 2021-10-05 RX ORDER — ALBUMIN (HUMAN) 12.5 G/50ML
12.5 SOLUTION INTRAVENOUS ONCE
Status: COMPLETED | OUTPATIENT
Start: 2021-10-05 | End: 2021-10-05

## 2021-10-05 RX ORDER — ALBUMIN (HUMAN) 12.5 G/50ML
SOLUTION INTRAVENOUS
Status: COMPLETED
Start: 2021-10-05 | End: 2021-10-05

## 2021-10-05 RX ADMIN — LIDOCAINE HYDROCHLORIDE 10 ML: 10 INJECTION, SOLUTION EPIDURAL; INFILTRATION; INTRACAUDAL; PERINEURAL at 08:58

## 2021-10-05 RX ADMIN — ALBUMIN (HUMAN) 12.5 G: 12.5 SOLUTION INTRAVENOUS at 09:47

## 2021-10-05 RX ADMIN — ALBUMIN HUMAN 12.5 G: 0.25 SOLUTION INTRAVENOUS at 09:47

## 2021-10-05 NOTE — PROGRESS NOTES
Patient tolerated Paracentesis well.  4800 mL of straw/fabiola fluid drained done by ROSEMARY Head  Dressing Bandaid with no drainage.  12.5g albumin given. Patient states understanding discharge instructions, taking P.O and home per self.

## 2021-10-05 NOTE — PROCEDURES
[facility]    Procedure: Paracentesis.     Date/Time: 10/5/2021 9:09 AM  Performed by: Darling Solorio DO  Authorized by: Darling Solorio DO     UNIVERSAL PROTOCOL   Site Marked: Yes  Prior Images Obtained and Reviewed:  Yes  Required items: Required blood products, implants, devices and special equipment available    Patient identity confirmed:  Verbally with patient, arm band, provided demographic data and hospital-assigned identification number  Patient was reevaluated immediately before administering moderate or deep sedation or anesthesia  Confirmation Checklist:  Patient's identity using two indicators, relevant allergies, procedure was appropriate and matched the consent or emergent situation and correct equipment/implants were available  Time out: Immediately prior to the procedure a time out was called    Universal Protocol: the Joint Commission Universal Protocol was followed    Preparation: Patient was prepped and draped in usual sterile fashion           ANESTHESIA    Anesthesia: Local infiltration  Local Anesthetic:  Lidocaine 1% without epinephrine      SEDATION    Patient Sedated: No    See dictated procedure note for full details.  Findings: Paracentesis.     Specimens: none    Complications: None    Condition: Stable    PROCEDURE   Patient Tolerance:  Patient tolerated the procedure well with no immediate complications    Length of time physician/provider present for 1:1 monitoring during sedation: 0

## 2021-10-06 ENCOUNTER — PATIENT OUTREACH (OUTPATIENT)
Dept: GASTROENTEROLOGY | Facility: CLINIC | Age: 56
End: 2021-10-06

## 2021-10-06 NOTE — PROGRESS NOTES
Called pt to follow up after procedure and discuss planning on next procedure, tentatively set for     Procedure/Imaging/Clinic: ERCP and EGD with possible variceal banding   Physician: Harvey   Timin weeks   Procedure length: 60 min   Anesthesia: Gen   Dx: primary sclerosing cholangitis, esophageal varices   Tier: 2   Location: OR [facility]    Patient needs to get pre-op physical completed. If outside  health system will need physical faxed to number [phone]   If you do not get a preop physical, your procedure could be cancelled, patient voiced understanding*    Preop Plan: Recent office visit  with Dr Leventhal, has another office visit with Leventhal in the morning on     COVID test discussed: yes, pt understands needs to be within 4 days of procedure    Patient Education r/t procedure: mychart    Verbalized understanding of all instructions. All questions answered.     Asking this be last  Case of the day on  to allow for Dr Leventhal's office visit    Case request previously ordered, message sent to OR     Jeanette Brennan RN, BSN,   [facility]  Care coordinator

## 2021-10-07 ENCOUNTER — DOCUMENTATION ONLY (OUTPATIENT)
Dept: TRANSPLANT | Facility: CLINIC | Age: 56
End: 2021-10-07

## 2021-10-07 ENCOUNTER — OFFICE VISIT (OUTPATIENT)
Dept: CARDIOLOGY | Facility: CLINIC | Age: 56
End: 2021-10-07
Attending: INTERNAL MEDICINE
Payer: COMMERCIAL

## 2021-10-07 VITALS
BODY MASS INDEX: 21.97 KG/M2 | SYSTOLIC BLOOD PRESSURE: 116 MMHG | WEIGHT: 132 LBS | DIASTOLIC BLOOD PRESSURE: 75 MMHG | HEART RATE: 82 BPM | OXYGEN SATURATION: 100 %

## 2021-10-07 DIAGNOSIS — R18.8 CIRRHOSIS OF LIVER WITH ASCITES, UNSPECIFIED HEPATIC CIRRHOSIS TYPE (H): ICD-10-CM

## 2021-10-07 DIAGNOSIS — K74.60 CIRRHOSIS OF LIVER WITH ASCITES, UNSPECIFIED HEPATIC CIRRHOSIS TYPE (H): ICD-10-CM

## 2021-10-07 DIAGNOSIS — R00.0 WIDE-COMPLEX TACHYCARDIA: Primary | ICD-10-CM

## 2021-10-07 PROCEDURE — G0463 HOSPITAL OUTPT CLINIC VISIT: HCPCS | Mod: 25

## 2021-10-07 PROCEDURE — 99215 OFFICE O/P EST HI 40 MIN: CPT | Mod: GC | Performed by: INTERNAL MEDICINE

## 2021-10-07 PROCEDURE — 93005 ELECTROCARDIOGRAM TRACING: CPT

## 2021-10-07 ASSESSMENT — PAIN SCALES - GENERAL: PAINLEVEL: NO PAIN (0)

## 2021-10-07 NOTE — LETTER
10/7/2021      RE: Berta Nava  [address] [facility] [id number]       Dear Colleague,    Thank you for the opportunity to participate in the care of your patient, Berta Nava, at the [facility] [place] [facility]. Please see a copy of my visit note below.    October 6, 2021    [facility] Cardiac Electrophysiology [facility]     Berta Nava is a 56 year old female with a history of hypertension, microscopic colitis, ulcerative colitis, pancreatic mucinous cystadenoma, primary sclerosing cholangitis, cirrhosis who was recently admitted for a liver transplantation which was aborted given wide complex tachycardia.     She was brought into the hospital on August 18 and was undergoing liver transplantation, but ultimately developed either V. tach or wide-complex atrial fibrillation that was unstable.  During operative course she was shocked at least 5 times, and it was a committee decision that the transplant be aborted given her cardiogenic instability.  She underwent a left heart cath and an MRI of the heart which did not demonstrate any overt abnormalities that would have predisposed her to this condition.  She was treated with amiodarone during the hospital course but electrophysiology felt that she could safely discontinue.     Since that hospitalization, she denies any complaints. Feels well. No episodes of palpitations, lightheadedness or dizziness. No chest pain or shortness of breath. She had a father with significant ischemic heart failure requiring transplantation at age 59. He had some arrhythmias, but no other family history of arrhythmias or sudden death. She has since be re-listed for transplant.    PAST MEDICAL HISTORY:  Past Medical History:   Diagnosis Date     Ascites      Biliary cirrhosis (H)      Cholangitis, sclerosing      Cirrhosis of liver with ascites (H) 3/3/2021     Hearing loss of left ear      wears a hearing aide     History of low potassium      Hyperlipidemia      Hypertension      SBP (spontaneous bacterial peritonitis) (H) 4/30/2021     Sjogren's syndrome (H)      Ulcerative colitis (H)      Ulcerative pancolitis (H)        FAMILY HISTORY:  Family History   Problem Relation Age of Onset     Cancer Mother         angiosarcoma     Coronary Artery Disease Early Onset Father         MI age 46     Heart Transplant Father      Liver Disease No family hx of      Ulcerative Colitis No family hx of      Crohn's Disease No family hx of        SOCIAL HISTORY:  Social History     Socioeconomic History     Marital status:      Spouse name: Not on file     Number of children: Not on file     Years of education: Not on file     Highest education level: Not on file   Occupational History     Not on file   Tobacco Use     Smoking status: Never Smoker     Smokeless tobacco: Never Used   Vaping Use     Vaping Use: Never used   Substance and Sexual Activity     Alcohol use: No     Comment: Last drink was 2017     Drug use: No     Sexual activity: Not Currently     Partners: Male   Other Topics Concern     Parent/sibling w/ CABG, MI or angioplasty before 65F 55M? Not Asked   Social History Narrative     Not on file     Social Determinants of Health     Financial Resource Strain:      Difficulty of Paying Living Expenses:    Food Insecurity:      Worried About Running Out of Food in the Last Year:      Ran Out of Food in the Last Year:    Transportation Needs:      Lack of Transportation (Medical):      Lack of Transportation (Non-Medical):    Physical Activity:      Days of Exercise per Week:      Minutes of Exercise per Session:    Stress:      Feeling of Stress :    Social Connections:      Frequency of Communication with Friends and Family:      Frequency of Social Gatherings with Friends and Family:      Attends Yazidism Services:      Active Member of Clubs or Organizations:      Attends Club or Organization  Meetings:      Marital Status:    Intimate Partner Violence:      Fear of Current or Ex-Partner:      Emotionally Abused:      Physically Abused:      Sexually Abused:        CURRENT MEDICATIONS:  Current Outpatient Medications   Medication Sig Dispense Refill     ciprofloxacin (CIPRO) 500 MG tablet Take 1 tablet (500 mg) by mouth every 24 hours 30 tablet 11     furosemide (LASIX) 20 MG tablet Take 2 tablets (40 mg) by mouth daily 180 tablet 3     lactulose (CEPHULAC) 10 GM packet Take 2 packets (20 g) by mouth daily as needed for constipation 30 packet 0     multivitamin (CENTRUM SILVER) tablet Take 1 tablet by mouth daily       omeprazole (PRILOSEC) 20 MG DR capsule Take 2 capsules (40 mg) by mouth 2 times daily 120 capsule 3     pantoprazole (PROTONIX) 40 MG EC tablet Take 1 tablet (40 mg) by mouth 2 times daily (before meals) 60 tablet 0     polyethylene glycol (MIRALAX) 17 GM/Dose powder Take 17 g by mouth daily (Patient taking differently: Take 17 g by mouth daily as needed ) 510 g 0     protein modular (PROSOURCE TF) LIQD 1 packet by Per Feeding Tube route 2 times daily 60 packet 0     spironolactone (ALDACTONE) 50 MG tablet Take 2 tablets (100 mg) by mouth daily 180 tablet 3     sucralfate (CARAFATE) 1 GM/10ML suspension Take 10 mLs (1 g) by mouth 4 times daily 10 mL 1     ursodiol (ACTIGALL) 300 MG capsule Take 1 capsule (300 mg) by mouth 2 times daily 180 capsule 1       ROS:   Comprehensive ROS negative except HPI    EXAM:  LMP  (LMP Unknown)   General: appears comfortable, alert and articulate  Head: normocephalic, atraumatic  Eyes: Scleral icterus, EOMI  Neck: no adenopathy  Orophyarynx: moist mucosa, no lesions, dentition intact  Heart: regular, S1/S2, no murmur, gallop, rub, no JVD  Lungs: clear, no rales or wheezing  Abdomen: soft, non-tender, bowel sounds present, no hepatosplenomegaly  Extremities: no clubbing, cyanosis or edema  Neurological: normal speech and affect, no gross motor  deficits    Labs:  Recent Labs   Lab Test 10/01/21  0632 09/16/21  1436   WBC 13.4* 13.0*   HGB 9.2* 8.8*   HCT 26.9* 26.5*    387     Recent Labs   Lab Test 10/01/21  0632 09/27/21  0758 09/16/21  1436 09/16/21  1436   * 129*   < > 129*   POTASSIUM 3.5  --   --  4.8   CHLORIDE 99  --   --  95   CO2 20  --   --  24   BUN 40*  --   --  45*   CR 0.76 0.77   < > 0.85   GFRESTIMATED 87 85   < > 76    < > = values in this interval not displayed.       EKG  10/7/2021 - NSR, QTc 453    Stress Test:  Interpretation Summary  Dobutamine stress echocardiogram with no inducible ischemia.     Target heart rate achieved. Hypotensive response to dobutamine  No angina symptoms during stress test.  No ECG evidence of ischemia at rest or with dobutamine.  Normal segmental and global left ventricular systolic function at baseline,  LVEF 55-60%.  With peak dobutamine, LVEF increased further to >70% and LV cavity size  decreased appropriately.  No stress induced regional wall motion abnormalities.     Normal aortic root and no significant valvular dysfunction noted on screening  2D and Doppler examination.    Coronary Angiogram:  Diagnostic  Dominance: Right    Left Main   The vessel was visualized by selective angiography. There was 0% vessel disease.   Left Anterior Descending   The vessel was visualized by selective angiography. There was 0% vessel disease.   Left Circumflex   The vessel was visualized by selective angiography. There was 0% vessel disease.   Right Coronary Artery   The vessel was visualized by selective angiography. There was 0% vessel disease.         Cardiac MRI  1. The LV is normal in cavity size and wall thickness. The global systolic function is hyperdynamic. The  LVEF is >70%. There are no regional wall motion abnormalities.     2. The RV is normal in cavity size. The global systolic function is hyperdynamic. The RVEF is >70%.      3. Both atria are normal in size.     4. There is no significant  valvular disease.      5. Late gadolinium enhancement imaging shows no MI, fibrosis or infiltrative disease.      6. There is no pericardial effusion or thickening.     7. There is no intracardiac thrombus.     8. A central venous catheter is noted in the superior vena cava. It extends into the right atrium.     CONCLUSIONS: Normal cardiac function without evidence of fibrosis, aneurysm, or morphological features to  suggest the cause of ventricular tachycardia.       Assessment and Plan: Berta Nava is a 56 year old female with a history of hypertension, microscopic colitis, ulcerative colitis, pancreatic mucinous cystadenoma, primary sclerosing cholangitis, cirrhosis who was recently admitted for a liver transplantation which was aborted given wide complex tachycardia. She is presenting to EP clinic for further evaluation.     #History of Wide Complex Tachycardia  Occurred intra-op prior to liver transplant, requiring cardioversion. Unclear what the underlying rhythm was at the time and unclear what precipitated it. Possibilities include stress from abdominal surgery, electrolyte disturbance, QT prolongation. No evidence of CAD or infiltrative disease on broad workup. Had not had any further episodes of ectopy during that hospitalization. No recurrent symptoms. Was on amiodarone in the hospital which has since been discontinued.     Given that we do not know the underlying rhythm, and that it was potentially provoked in the setting of abdominal surgery, she does not require an ICD for secondary prevention. She also does not require any anti-arrhythmic therapy at this time. No further intervention necessary from an electrophysiology standpoint prior to transplant.     In the gerardo-operative period, recommend the following:  - Avoid QT prolonging medications  - Ensure adequate potassium and magnesium levels    This patient was seen and discussed with Dr. Guan.      Luan Carlos MD  Cardiology  Fellow  [phone]      Attestation signed by Lou Guan MD at 10/8/2021 10:55 AM:  Patient seen and examined with Dr. Carlos. The history and physical findings are accurate as recorded.   Briefly, 55 yo s/p resuscitated VT arrest intraop during liver transplant. WCT with hypotension was reported by anesthesia during liver transplant - abdomen was open, ascites evacuated, ligaments divided, then WCT reported, multiple shocks delivered, multiple pressors required. Surgery terminated at that time. Post op evaluations all normal, including angiogram and cardiac MRI. Patient had no prior cardiac history, and no further events during hospitalization. EP saw patient post arrest, did not think that antiarrhythmic drugs or ICD needed at time of discharge.  She is currently relisted for liver transplant.    All labs, imaging studies, ECG and telemetry data have been reviewed personally. Specifically,   EKG today 10/7/2021: sinus 84 bpm, QTc 450ms    The assessment and plans outlined reflect our joint decision making.  Agree with hospital EP team assessment. WCT (no rhythm strips available) was associated with hypotension intraop. She has had no other events. Cardiac evaluations all normal. I do not think an ICD or antiarrhythmic drugs are needed. No contraindication to proceeding with liver transplant; however, it is of course unknown as to whether she would have recurrent WCT.   Can consider giving IV amiodarone bolus prior to going into surgery, although I have no great data to support this use.    Dr. Carlos and I spent a total of 30 minutes with the patient today, and another 30 minutes on documentation.    Lou Guan MD  Cardiology / Electrophysiology

## 2021-10-07 NOTE — NURSING NOTE
Chief Complaint   Patient presents with     New Patient     f/u post cardiac arrest     Vitals were taken, medications reconciled, and EKG was performed.    Joaquin Varela, EMT  2:26 PM

## 2021-10-08 LAB
ATRIAL RATE - MUSE: 84 BPM
DIASTOLIC BLOOD PRESSURE - MUSE: NORMAL MMHG
INTERPRETATION ECG - MUSE: NORMAL
P AXIS - MUSE: 12 DEGREES
PR INTERVAL - MUSE: 144 MS
QRS DURATION - MUSE: 98 MS
QT - MUSE: 384 MS
QTC - MUSE: 453 MS
R AXIS - MUSE: 34 DEGREES
SYSTOLIC BLOOD PRESSURE - MUSE: NORMAL MMHG
T AXIS - MUSE: 24 DEGREES
VENTRICULAR RATE- MUSE: 84 BPM

## 2021-10-11 ENCOUNTER — HOSPITAL ENCOUNTER (OUTPATIENT)
Dept: ULTRASOUND IMAGING | Facility: CLINIC | Age: 56
Discharge: HOME | End: 2021-10-11
Attending: INTERNAL MEDICINE | Admitting: INTERNAL MEDICINE
Payer: COMMERCIAL

## 2021-10-11 VITALS — OXYGEN SATURATION: 100 % | SYSTOLIC BLOOD PRESSURE: 101 MMHG | DIASTOLIC BLOOD PRESSURE: 64 MMHG | HEART RATE: 84 BPM

## 2021-10-11 DIAGNOSIS — K74.60 CIRRHOSIS OF LIVER WITH ASCITES, UNSPECIFIED HEPATIC CIRRHOSIS TYPE (H): ICD-10-CM

## 2021-10-11 DIAGNOSIS — R18.8 CIRRHOSIS OF LIVER WITH ASCITES, UNSPECIFIED HEPATIC CIRRHOSIS TYPE (H): ICD-10-CM

## 2021-10-11 PROCEDURE — 250N000011 HC RX IP 250 OP 636

## 2021-10-11 PROCEDURE — 250N000009 HC RX 250: Performed by: RADIOLOGY

## 2021-10-11 PROCEDURE — 49083 ABD PARACENTESIS W/IMAGING: CPT

## 2021-10-11 PROCEDURE — P9047 ALBUMIN (HUMAN), 25%, 50ML: HCPCS

## 2021-10-11 RX ORDER — ALBUMIN (HUMAN) 12.5 G/50ML
12.5 SOLUTION INTRAVENOUS ONCE
Status: COMPLETED | OUTPATIENT
Start: 2021-10-11 | End: 2021-10-11

## 2021-10-11 RX ORDER — ALBUMIN (HUMAN) 12.5 G/50ML
SOLUTION INTRAVENOUS
Status: COMPLETED
Start: 2021-10-11 | End: 2021-10-11

## 2021-10-11 RX ORDER — LIDOCAINE HYDROCHLORIDE 10 MG/ML
10 INJECTION, SOLUTION EPIDURAL; INFILTRATION; INTRACAUDAL; PERINEURAL ONCE
Status: COMPLETED | OUTPATIENT
Start: 2021-10-11 | End: 2021-10-11

## 2021-10-11 RX ADMIN — LIDOCAINE HYDROCHLORIDE 10 ML: 10 INJECTION, SOLUTION EPIDURAL; INFILTRATION; INTRACAUDAL; PERINEURAL at 15:47

## 2021-10-11 RX ADMIN — ALBUMIN (HUMAN) 12.5 G: 12.5 SOLUTION INTRAVENOUS at 15:47

## 2021-10-11 RX ADMIN — ALBUMIN HUMAN 12.5 G: 0.25 SOLUTION INTRAVENOUS at 15:47

## 2021-10-11 NOTE — PROCEDURES
[facility]    Procedure: Imaging Procedure Note    Date/Time: 10/11/2021 3:20 PM  Performed by: Rick Richards MD  Authorized by: Rick Richards MD     UNIVERSAL PROTOCOL   Site Marked: Yes  Prior Images Obtained and Reviewed:  Yes  Required items: Required blood products, implants, devices and special equipment available    Patient identity confirmed:  Verbally with patient, arm band and provided demographic data  NA - No sedation, light sedation, or local anesthesia  Confirmation Checklist:  Patient's identity using two indicators, relevant allergies, procedure was appropriate and matched the consent or emergent situation and correct equipment/implants were available  Time out: Immediately prior to the procedure a time out was called    Universal Protocol: the Joint Commission Universal Protocol was followed    Preparation: Patient was prepped and draped in usual sterile fashion    ESBL (mL):  0         ANESTHESIA    Anesthesia: Local infiltration  Local Anesthetic:  Lidocaine 1% without epinephrine  Anesthetic Total (mL):  10      SEDATION    Patient Sedated: No    See dictated procedure note for full details.  PROCEDURE   Patient Tolerance:  Patient tolerated the procedure well with no immediate complications  Describe Procedure: RLQ Paracentesis for therapeutic intent  Length of time physician/provider present for 1:1 monitoring during sedation: 0

## 2021-10-11 NOTE — PROGRESS NOTES
Pt was in Radiology today for a paracentesis. Procedure performed by Dr Richards Procedure was tolerated well, vitals remained stable.3700 cc's of fabiola colored fluid removed. 12.5 Albumin instilled per protocol (had started it thinking we would get 4L but flow stopped, infused the 12.5G). Written and verbal instructions were reviewed here is no evidence of bleeding upon discharge.  Pt left department in stable satisfactory condition.

## 2021-10-13 ENCOUNTER — HOSPITAL ENCOUNTER (OUTPATIENT)
Facility: CLINIC | Age: 56
End: 2021-10-13
Attending: INTERNAL MEDICINE | Admitting: INTERNAL MEDICINE
Payer: COMMERCIAL

## 2021-10-15 ENCOUNTER — LAB (OUTPATIENT)
Dept: LAB | Facility: CLINIC | Age: 56
End: 2021-10-15
Payer: COMMERCIAL

## 2021-10-15 DIAGNOSIS — Z11.59 ENCOUNTER FOR SCREENING FOR OTHER VIRAL DISEASES: ICD-10-CM

## 2021-10-15 PROCEDURE — U0003 INFECTIOUS AGENT DETECTION BY NUCLEIC ACID (DNA OR RNA); SEVERE ACUTE RESPIRATORY SYNDROME CORONAVIRUS 2 (SARS-COV-2) (CORONAVIRUS DISEASE [COVID-19]), AMPLIFIED PROBE TECHNIQUE, MAKING USE OF HIGH THROUGHPUT TECHNOLOGIES AS DESCRIBED BY CMS-2020-01-R: HCPCS

## 2021-10-15 PROCEDURE — U0005 INFEC AGEN DETEC AMPLI PROBE: HCPCS

## 2021-10-16 ENCOUNTER — ORGAN (OUTPATIENT)
Dept: TRANSPLANT | Facility: CLINIC | Age: 56
End: 2021-10-16

## 2021-10-16 DIAGNOSIS — Z76.82 AWAITING ORGAN TRANSPLANT: Primary | ICD-10-CM

## 2021-10-16 DIAGNOSIS — K74.60 CIRRHOSIS OF LIVER (H): ICD-10-CM

## 2021-10-16 LAB
ABO/RH(D): NORMAL
ANION GAP SERPL CALCULATED.3IONS-SCNC: 9 MMOL/L (ref 3–14)
ANTIBODY SCREEN: NEGATIVE
BASOPHILS # BLD AUTO: 0.1 10E3/UL (ref 0–0.2)
BASOPHILS NFR BLD AUTO: 1 %
BUN SERPL-MCNC: 35 MG/DL (ref 7–30)
CALCIUM SERPL-MCNC: 9.1 MG/DL (ref 8.5–10.1)
CHLORIDE BLD-SCNC: 96 MMOL/L (ref 94–109)
CO2 SERPL-SCNC: 22 MMOL/L (ref 20–32)
CREAT SERPL-MCNC: 0.71 MG/DL (ref 0.52–1.04)
EOSINOPHIL # BLD AUTO: 0.3 10E3/UL (ref 0–0.7)
EOSINOPHIL NFR BLD AUTO: 2 %
ERYTHROCYTE [DISTWIDTH] IN BLOOD BY AUTOMATED COUNT: 15.6 % (ref 10–15)
GFR SERPL CREATININE-BSD FRML MDRD: >90 ML/MIN/1.73M2
GLUCOSE BLD-MCNC: 109 MG/DL (ref 70–99)
HCT VFR BLD AUTO: 30.2 % (ref 35–47)
HGB BLD-MCNC: 10 G/DL (ref 11.7–15.7)
IMM GRANULOCYTES # BLD: 0.1 10E3/UL
IMM GRANULOCYTES NFR BLD: 1 %
INR PPP: 1.27 (ref 0.85–1.15)
LYMPHOCYTES # BLD AUTO: 0.6 10E3/UL (ref 0.8–5.3)
LYMPHOCYTES NFR BLD AUTO: 4 %
MCH RBC QN AUTO: 30.7 PG (ref 26.5–33)
MCHC RBC AUTO-ENTMCNC: 33.1 G/DL (ref 31.5–36.5)
MCV RBC AUTO: 93 FL (ref 78–100)
MONOCYTES # BLD AUTO: 1.2 10E3/UL (ref 0–1.3)
MONOCYTES NFR BLD AUTO: 9 %
NEUTROPHILS # BLD AUTO: 11.5 10E3/UL (ref 1.6–8.3)
NEUTROPHILS NFR BLD AUTO: 83 %
NRBC # BLD AUTO: 0 10E3/UL
NRBC BLD AUTO-RTO: 0 /100
PLATELET # BLD AUTO: 367 10E3/UL (ref 150–450)
POTASSIUM BLD-SCNC: 3.7 MMOL/L (ref 3.4–5.3)
RBC # BLD AUTO: 3.26 10E6/UL (ref 3.8–5.2)
SARS-COV-2 RNA RESP QL NAA+PROBE: NEGATIVE
SODIUM SERPL-SCNC: 127 MMOL/L (ref 133–144)
SPECIMEN EXPIRATION DATE: NORMAL
WBC # BLD AUTO: 13.7 10E3/UL (ref 4–11)

## 2021-10-16 PROCEDURE — 36415 COLL VENOUS BLD VENIPUNCTURE: CPT

## 2021-10-16 PROCEDURE — 86900 BLOOD TYPING SEROLOGIC ABO: CPT | Performed by: TRANSPLANT SURGERY

## 2021-10-16 PROCEDURE — 85025 COMPLETE CBC W/AUTO DIFF WBC: CPT | Performed by: TRANSPLANT SURGERY

## 2021-10-16 PROCEDURE — 80048 BASIC METABOLIC PNL TOTAL CA: CPT | Performed by: TRANSPLANT SURGERY

## 2021-10-16 PROCEDURE — 85610 PROTHROMBIN TIME: CPT | Performed by: TRANSPLANT SURGERY

## 2021-10-17 NOTE — TELEPHONE ENCOUNTER
Organ Offer Encounter Information    Organ Offer Information  Organ offer date & time: 10/16/2021 12:17 PM  Coordinator/Fellow/Attending name: Marlene Campos RN   Organ(s):  Organ UNOS ID Match Run ID Comment Organ Laterality   Liver  [id number] CASD       Recent infections?: No    New medications?: No Recent pregnancy?: No   Angicoagulation medications?: No Recent vaccinations?: No   Recent blood transfusions?: No Recent hospitalizations?: Yes (Comment: August 19th (surgery) left september 9th)   Has your insurance changed in the last 6-12 months?: Neg    Discussed organ offer with: Patient  Discussed risk category with Patient/Other: N/A  Patient/Other asked to speak to a surgeon?: No  Discussed program-specific outcomes: Did not have questions regarding SRTR  Right to decline organ offer without penalty, Patient/Other: Aware of option to decline without penalty  Organ offer decision status Patient/Other: Accepted Offer  Organ disposition: Case Cancelled - Other (specify)  Additional Comments: 10/16/2021 12:30 PM  Liver: Import  MD: Dr. Schmitt  OPO Contact: Jenae [phone]  Donor/Recip HCV Status: Neg/Neg  (HCV+ Donors - Discuss HCV genotyping/quant testing with MD & send message to [facility] HCV POOL - Include Donor UNOS ID)  Donor Nutritional Status: MIV no dextrose  Plan (NPO, Donor OR): Donor OR 0000 CST. NPO after midnight. Will have patient get labs drawn at the acute care lab around 1400 to have CBC with plts, BMP, type and screen and INR drawn.   - - -   COVID Screening  In the past month, have you had: no  Any close contact with a suspect or laboratory-confirmed COVID-19 patient: no  Travel anywhere: no  COVID Symptoms (Fever, Cough, Short of Breath, Loss of Taste/Smell, Rash): no    Donor OR Time: 2200 PT 0000 CST  Procuring MD: Dr. Nicola Najera  Contact in the OR: Jenae [phone]  Organs Being Procured: Liver Kidney  Flush Solution: UW  Biopsy: Yes  Pump: NA  Special  Requests (Special blood tubes, nodes, waivers): no  MD for Visualization: Dr. Schmitt  Transportation Details: Jet on soft hold through [place]. Jet has 3 hour repositioning time once we say go, then  3.5 hour flight from hospital to [facility]. (the hospital is 30 min from the airport)    10/16/2021 7:00 PM:  Per Nicola at [facility] DARIELA gave the plane we had on soft hold to someone else. Called Jenae at [facility] to see if she had any flight options; she was able to put a hot hold on a Jet with [employer] with reposition time of 30 minutes and flight time to [place] of 3 hour 20 min. Verified that this was ok with Dr. Schmitt; hot hold placed. Trip # [id number] Tail # [id number]  Marlene Campos  Transplant Coordinator    10/16/2021 8:30 PM:  Reminded Berta to be NPO after midnight and to have her ringer on so she would get my phone call in the middle of the night.   Marlene Campos  Transplant Coordinator    10/17/2021 01:08 AM:  Per Dr. Schmitt and the recovering surgeon the liver is large. Called Berta to let her know that the liver is too large for her and that we would continue to look for a liver for her.   Marlene Campos   Transplant Coordinator  Attestation I have discussed all of the above with the Patient/Legal Guardian/Caregiver regarding this organ offer.: Yes  Coordinator/Fellow/Attending name: Marlene Campos RN

## 2021-10-18 DIAGNOSIS — Z11.59 ENCOUNTER FOR SCREENING FOR OTHER VIRAL DISEASES: ICD-10-CM

## 2021-10-19 ENCOUNTER — HOSPITAL ENCOUNTER (OUTPATIENT)
Dept: ULTRASOUND IMAGING | Facility: CLINIC | Age: 56
End: 2021-10-19
Attending: INTERNAL MEDICINE
Payer: COMMERCIAL

## 2021-10-19 ENCOUNTER — LAB (OUTPATIENT)
Dept: LAB | Facility: CLINIC | Age: 56
End: 2021-10-19
Attending: INTERNAL MEDICINE
Payer: COMMERCIAL

## 2021-10-19 VITALS — DIASTOLIC BLOOD PRESSURE: 67 MMHG | HEART RATE: 90 BPM | SYSTOLIC BLOOD PRESSURE: 177 MMHG | RESPIRATION RATE: 18 BRPM

## 2021-10-19 DIAGNOSIS — R18.8 CIRRHOSIS OF LIVER WITH ASCITES, UNSPECIFIED HEPATIC CIRRHOSIS TYPE (H): ICD-10-CM

## 2021-10-19 DIAGNOSIS — K74.60 CIRRHOSIS OF LIVER (H): ICD-10-CM

## 2021-10-19 DIAGNOSIS — K74.60 CIRRHOSIS OF LIVER WITH ASCITES, UNSPECIFIED HEPATIC CIRRHOSIS TYPE (H): ICD-10-CM

## 2021-10-19 LAB
ALBUMIN SERPL-MCNC: 3 G/DL (ref 3.4–5)
BILIRUB SERPL-MCNC: 6.1 MG/DL (ref 0.2–1.3)
CREAT SERPL-MCNC: 0.9 MG/DL (ref 0.52–1.04)
GFR SERPL CREATININE-BSD FRML MDRD: 71 ML/MIN/1.73M2
INR PPP: 1.24 (ref 0.85–1.15)
SODIUM SERPL-SCNC: 131 MMOL/L (ref 133–144)

## 2021-10-19 PROCEDURE — 82040 ASSAY OF SERUM ALBUMIN: CPT

## 2021-10-19 PROCEDURE — 250N000011 HC RX IP 250 OP 636: Performed by: INTERNAL MEDICINE

## 2021-10-19 PROCEDURE — 36415 COLL VENOUS BLD VENIPUNCTURE: CPT

## 2021-10-19 PROCEDURE — 85610 PROTHROMBIN TIME: CPT

## 2021-10-19 PROCEDURE — 82565 ASSAY OF CREATININE: CPT

## 2021-10-19 PROCEDURE — 250N000009 HC RX 250: Performed by: RADIOLOGY

## 2021-10-19 PROCEDURE — 84295 ASSAY OF SERUM SODIUM: CPT

## 2021-10-19 PROCEDURE — P9047 ALBUMIN (HUMAN), 25%, 50ML: HCPCS | Performed by: INTERNAL MEDICINE

## 2021-10-19 PROCEDURE — 272N000706 US PARACENTESIS

## 2021-10-19 PROCEDURE — 82247 BILIRUBIN TOTAL: CPT

## 2021-10-19 RX ORDER — ALBUMIN (HUMAN) 12.5 G/50ML
37.5 SOLUTION INTRAVENOUS ONCE
Status: COMPLETED | OUTPATIENT
Start: 2021-10-19 | End: 2021-10-19

## 2021-10-19 RX ORDER — ALBUMIN (HUMAN) 12.5 G/50ML
SOLUTION INTRAVENOUS
Status: DISCONTINUED
Start: 2021-10-19 | End: 2021-10-20 | Stop reason: HOSPADM

## 2021-10-19 RX ADMIN — LIDOCAINE HYDROCHLORIDE 20 ML: 10 INJECTION, SOLUTION EPIDURAL; INFILTRATION; INTRACAUDAL; PERINEURAL at 15:52

## 2021-10-19 RX ADMIN — ALBUMIN HUMAN 37.5 G: 0.25 SOLUTION INTRAVENOUS at 16:28

## 2021-10-19 NOTE — PROGRESS NOTES
Paracentesis complete.  Consent obtained with Dr. Cervantes.  Pt tolerated well, drained 6350 mLs clear fabiola colored fluid.  Site at right and left..  Site covered with a sterile bandaid.  Site CDI.  VSS.  No complications noted.  Reviewed discharge instructions with pt.  Pt stable on discharge.

## 2021-10-22 DIAGNOSIS — Z11.59 ENCOUNTER FOR SCREENING FOR OTHER VIRAL DISEASES: ICD-10-CM

## 2021-10-23 ENCOUNTER — HEALTH MAINTENANCE LETTER (OUTPATIENT)
Age: 56
End: 2021-10-23

## 2021-10-25 ENCOUNTER — APPOINTMENT (OUTPATIENT)
Dept: CT IMAGING | Facility: CLINIC | Age: 56
DRG: 442 | End: 2021-10-25
Attending: EMERGENCY MEDICINE
Payer: COMMERCIAL

## 2021-10-25 ENCOUNTER — APPOINTMENT (OUTPATIENT)
Dept: GENERAL RADIOLOGY | Facility: CLINIC | Age: 56
DRG: 442 | End: 2021-10-25
Attending: PHYSICIAN ASSISTANT
Payer: COMMERCIAL

## 2021-10-25 ENCOUNTER — HOSPITAL ENCOUNTER (INPATIENT)
Facility: CLINIC | Age: 56
LOS: 2 days | Discharge: HOME | DRG: 442 | End: 2021-10-27
Attending: EMERGENCY MEDICINE | Admitting: INTERNAL MEDICINE
Payer: COMMERCIAL

## 2021-10-25 DIAGNOSIS — E87.1 HYPONATREMIA: Primary | ICD-10-CM

## 2021-10-25 DIAGNOSIS — R18.8 CIRRHOSIS OF LIVER WITH ASCITES, UNSPECIFIED HEPATIC CIRRHOSIS TYPE (H): ICD-10-CM

## 2021-10-25 DIAGNOSIS — R41.0 CONFUSION: ICD-10-CM

## 2021-10-25 DIAGNOSIS — G93.40 ENCEPHALOPATHY: ICD-10-CM

## 2021-10-25 DIAGNOSIS — K74.60 CIRRHOSIS OF LIVER WITH ASCITES, UNSPECIFIED HEPATIC CIRRHOSIS TYPE (H): ICD-10-CM

## 2021-10-25 LAB
ALBUMIN SERPL-MCNC: 2.5 G/DL (ref 3.4–5)
ALBUMIN UR-MCNC: NEGATIVE MG/DL
ALP SERPL-CCNC: 308 U/L (ref 40–150)
ALT SERPL W P-5'-P-CCNC: 39 U/L (ref 0–50)
AMMONIA PLAS-SCNC: 33 UMOL/L (ref 10–50)
ANION GAP SERPL CALCULATED.3IONS-SCNC: 12 MMOL/L (ref 3–14)
APPEARANCE UR: CLEAR
AST SERPL W P-5'-P-CCNC: 59 U/L (ref 0–45)
BASOPHILS # BLD AUTO: 0 10E3/UL (ref 0–0.2)
BASOPHILS NFR BLD AUTO: 0 %
BILIRUB SERPL-MCNC: 5.8 MG/DL (ref 0.2–1.3)
BILIRUB UR QL STRIP: ABNORMAL
BUN SERPL-MCNC: 29 MG/DL (ref 7–30)
CALCIUM SERPL-MCNC: 9.8 MG/DL (ref 8.5–10.1)
CHLORIDE BLD-SCNC: 98 MMOL/L (ref 94–109)
CO2 SERPL-SCNC: 22 MMOL/L (ref 20–32)
COLOR UR AUTO: YELLOW
CREAT SERPL-MCNC: 0.73 MG/DL (ref 0.52–1.04)
EOSINOPHIL # BLD AUTO: 0.1 10E3/UL (ref 0–0.7)
EOSINOPHIL NFR BLD AUTO: 1 %
ERYTHROCYTE [DISTWIDTH] IN BLOOD BY AUTOMATED COUNT: 15.2 % (ref 10–15)
GFR SERPL CREATININE-BSD FRML MDRD: >90 ML/MIN/1.73M2
GLUCOSE BLD-MCNC: 122 MG/DL (ref 70–99)
GLUCOSE UR STRIP-MCNC: NEGATIVE MG/DL
HCO3 BLDV-SCNC: 22 MMOL/L (ref 21–28)
HCT VFR BLD AUTO: 32.4 % (ref 35–47)
HGB BLD-MCNC: 10.5 G/DL (ref 11.7–15.7)
HGB UR QL STRIP: NEGATIVE
HYALINE CASTS: 3 /LPF
IMM GRANULOCYTES # BLD: 0.1 10E3/UL
IMM GRANULOCYTES NFR BLD: 1 %
KETONES UR STRIP-MCNC: NEGATIVE MG/DL
LACTATE BLD-SCNC: 2.4 MMOL/L
LACTATE SERPL-SCNC: 1.6 MMOL/L (ref 0.7–2)
LEUKOCYTE ESTERASE UR QL STRIP: NEGATIVE
LIPASE SERPL-CCNC: 248 U/L (ref 73–393)
LYMPHOCYTES # BLD AUTO: 0.5 10E3/UL (ref 0.8–5.3)
LYMPHOCYTES NFR BLD AUTO: 4 %
MAGNESIUM SERPL-MCNC: 2.1 MG/DL (ref 1.6–2.3)
MCH RBC QN AUTO: 30.3 PG (ref 26.5–33)
MCHC RBC AUTO-ENTMCNC: 32.4 G/DL (ref 31.5–36.5)
MCV RBC AUTO: 93 FL (ref 78–100)
MONOCYTES # BLD AUTO: 0.8 10E3/UL (ref 0–1.3)
MONOCYTES NFR BLD AUTO: 7 %
NEUTROPHILS # BLD AUTO: 10.2 10E3/UL (ref 1.6–8.3)
NEUTROPHILS NFR BLD AUTO: 87 %
NITRATE UR QL: NEGATIVE
NRBC # BLD AUTO: 0 10E3/UL
NRBC BLD AUTO-RTO: 0 /100
PCO2 BLDV: 31 MM HG (ref 40–50)
PH BLDV: 7.46 [PH] (ref 7.32–7.43)
PH UR STRIP: 5.5 [PH] (ref 5–7)
PLATELET # BLD AUTO: 338 10E3/UL (ref 150–450)
PO2 BLDV: 20 MM HG (ref 25–47)
POTASSIUM BLD-SCNC: 4.1 MMOL/L (ref 3.4–5.3)
PROT SERPL-MCNC: 7.9 G/DL (ref 6.8–8.8)
RBC # BLD AUTO: 3.47 10E6/UL (ref 3.8–5.2)
RBC URINE: <1 /HPF
SAO2 % BLDV: 37 % (ref 94–100)
SARS-COV-2 RNA RESP QL NAA+PROBE: NEGATIVE
SODIUM SERPL-SCNC: 132 MMOL/L (ref 133–144)
SP GR UR STRIP: 1.02 (ref 1–1.03)
SQUAMOUS EPITHELIAL: <1 /HPF
UROBILINOGEN UR STRIP-MCNC: NORMAL MG/DL
WBC # BLD AUTO: 11.6 10E3/UL (ref 4–11)
WBC URINE: 1 /HPF

## 2021-10-25 PROCEDURE — 82803 BLOOD GASES ANY COMBINATION: CPT

## 2021-10-25 PROCEDURE — 96360 HYDRATION IV INFUSION INIT: CPT

## 2021-10-25 PROCEDURE — 84460 ALANINE AMINO (ALT) (SGPT): CPT | Performed by: EMERGENCY MEDICINE

## 2021-10-25 PROCEDURE — 87635 SARS-COV-2 COVID-19 AMP PRB: CPT | Performed by: EMERGENCY MEDICINE

## 2021-10-25 PROCEDURE — 93005 ELECTROCARDIOGRAM TRACING: CPT

## 2021-10-25 PROCEDURE — 99223 1ST HOSP IP/OBS HIGH 75: CPT | Mod: AI | Performed by: INTERNAL MEDICINE

## 2021-10-25 PROCEDURE — 120N000001 HC R&B MED SURG/OB

## 2021-10-25 PROCEDURE — 85025 COMPLETE CBC W/AUTO DIFF WBC: CPT | Performed by: EMERGENCY MEDICINE

## 2021-10-25 PROCEDURE — 99207 PR APP CREDIT; MD BILLING SHARED VISIT: CPT | Performed by: PHYSICIAN ASSISTANT

## 2021-10-25 PROCEDURE — 83605 ASSAY OF LACTIC ACID: CPT | Performed by: EMERGENCY MEDICINE

## 2021-10-25 PROCEDURE — 36415 COLL VENOUS BLD VENIPUNCTURE: CPT | Performed by: EMERGENCY MEDICINE

## 2021-10-25 PROCEDURE — 84450 TRANSFERASE (AST) (SGOT): CPT | Performed by: EMERGENCY MEDICINE

## 2021-10-25 PROCEDURE — 258N000003 HC RX IP 258 OP 636: Performed by: EMERGENCY MEDICINE

## 2021-10-25 PROCEDURE — 70450 CT HEAD/BRAIN W/O DYE: CPT

## 2021-10-25 PROCEDURE — 87040 BLOOD CULTURE FOR BACTERIA: CPT | Performed by: EMERGENCY MEDICINE

## 2021-10-25 PROCEDURE — 82140 ASSAY OF AMMONIA: CPT | Performed by: EMERGENCY MEDICINE

## 2021-10-25 PROCEDURE — 99285 EMERGENCY DEPT VISIT HI MDM: CPT | Mod: 25

## 2021-10-25 PROCEDURE — 83690 ASSAY OF LIPASE: CPT | Performed by: EMERGENCY MEDICINE

## 2021-10-25 PROCEDURE — C9803 HOPD COVID-19 SPEC COLLECT: HCPCS

## 2021-10-25 PROCEDURE — 71046 X-RAY EXAM CHEST 2 VIEWS: CPT

## 2021-10-25 PROCEDURE — 250N000013 HC RX MED GY IP 250 OP 250 PS 637: Performed by: PHYSICIAN ASSISTANT

## 2021-10-25 PROCEDURE — 83735 ASSAY OF MAGNESIUM: CPT | Performed by: EMERGENCY MEDICINE

## 2021-10-25 PROCEDURE — 81001 URINALYSIS AUTO W/SCOPE: CPT | Performed by: EMERGENCY MEDICINE

## 2021-10-25 RX ORDER — ONDANSETRON 2 MG/ML
4 INJECTION INTRAMUSCULAR; INTRAVENOUS EVERY 6 HOURS PRN
Status: DISCONTINUED | OUTPATIENT
Start: 2021-10-25 | End: 2021-10-27 | Stop reason: HOSPADM

## 2021-10-25 RX ORDER — FUROSEMIDE 40 MG
40 TABLET ORAL DAILY
Status: DISCONTINUED | OUTPATIENT
Start: 2021-10-26 | End: 2021-10-27 | Stop reason: HOSPADM

## 2021-10-25 RX ORDER — ONDANSETRON 4 MG/1
4 TABLET, ORALLY DISINTEGRATING ORAL EVERY 6 HOURS PRN
Status: DISCONTINUED | OUTPATIENT
Start: 2021-10-25 | End: 2021-10-27 | Stop reason: HOSPADM

## 2021-10-25 RX ORDER — CIPROFLOXACIN 250 MG/1
500 TABLET, FILM COATED ORAL EVERY 24 HOURS
Status: DISCONTINUED | OUTPATIENT
Start: 2021-10-25 | End: 2021-10-27 | Stop reason: HOSPADM

## 2021-10-25 RX ORDER — URSODIOL 300 MG/1
300 CAPSULE ORAL 2 TIMES DAILY
Status: DISCONTINUED | OUTPATIENT
Start: 2021-10-25 | End: 2021-10-27 | Stop reason: HOSPADM

## 2021-10-25 RX ORDER — LACTULOSE 10 G/15ML
20 SOLUTION ORAL 2 TIMES DAILY
Status: DISCONTINUED | OUTPATIENT
Start: 2021-10-25 | End: 2021-10-27 | Stop reason: HOSPADM

## 2021-10-25 RX ORDER — LIDOCAINE 40 MG/G
CREAM TOPICAL
Status: DISCONTINUED | OUTPATIENT
Start: 2021-10-25 | End: 2021-10-27 | Stop reason: HOSPADM

## 2021-10-25 RX ORDER — LIDOCAINE 40 MG/G
CREAM TOPICAL
Status: DISCONTINUED | OUTPATIENT
Start: 2021-10-25 | End: 2021-10-26

## 2021-10-25 RX ORDER — SPIRONOLACTONE 100 MG/1
100 TABLET, FILM COATED ORAL DAILY
Status: DISCONTINUED | OUTPATIENT
Start: 2021-10-26 | End: 2021-10-27 | Stop reason: HOSPADM

## 2021-10-25 RX ADMIN — SODIUM CHLORIDE 500 ML: 9 INJECTION, SOLUTION INTRAVENOUS at 11:39

## 2021-10-25 RX ADMIN — OMEPRAZOLE 40 MG: 20 CAPSULE, DELAYED RELEASE ORAL at 18:23

## 2021-10-25 RX ADMIN — LACTULOSE 20 G: 20 SOLUTION ORAL at 21:29

## 2021-10-25 RX ADMIN — CIPROFLOXACIN HYDROCHLORIDE 500 MG: 250 TABLET, FILM COATED ORAL at 21:29

## 2021-10-25 RX ADMIN — URSODIOL 300 MG: 300 CAPSULE ORAL at 21:29

## 2021-10-25 ASSESSMENT — MIFFLIN-ST. JEOR: SCORE: 1185.07

## 2021-10-25 ASSESSMENT — ACTIVITIES OF DAILY LIVING (ADL)
ADLS_ACUITY_SCORE: 10
ADLS_ACUITY_SCORE: 6
ADLS_ACUITY_SCORE: 6
WALKING_OR_CLIMBING_STAIRS_DIFFICULTY: NO
ADLS_ACUITY_SCORE: 8
PATIENT_/_FAMILY_COMMUNICATION_STYLE: SPOKEN LANGUAGE (ENGLISH OR BILINGUAL)
DIFFICULTY_EATING/SWALLOWING: NO
WERE_AUXILIARY_AIDS_OFFERED?: YES
ADLS_ACUITY_SCORE: 8
VISION_MANAGEMENT: READING GLASSES
DRESSING/BATHING_DIFFICULTY: NO
ADLS_ACUITY_SCORE: 8
HEARING_DIFFICULTY_OR_DEAF: YES
THE_FOLLOWING_AIDS_WERE_PROVIDED;: PATIENT DECLINED OFFER OF AUXILIARY AIDS
TOILETING_ISSUES: NO
CONCENTRATING,_REMEMBERING_OR_MAKING_DECISIONS_DIFFICULTY: YES
DIFFICULTY_COMMUNICATING: NO
PATIENT'S_PREFERRED_MEANS_OF_COMMUNICATION: ENGLISH SPEAKER WITH HEARING LOSS, NO SPEECH PROBLEMS.
DESCRIBE_HEARING_LOSS: BILATERAL HEARING LOSS
ADLS_ACUITY_SCORE: 10
FALL_HISTORY_WITHIN_LAST_SIX_MONTHS: NO
ADLS_ACUITY_SCORE: 10
WEAR_GLASSES_OR_BLIND: YES
USE_OF_HEARING_ASSISTIVE_DEVICES: BILATERAL HEARING AIDS
ADLS_ACUITY_SCORE: 8
WHICH_OF_THE_ABOVE_FUNCTIONAL_RISKS_HAD_A_RECENT_ONSET_OR_CHANGE?: COGNITION

## 2021-10-25 NOTE — ED NOTES
[facility]  ED Nurse Handoff Report    Berta Nava is a 56 year old female   ED Chief complaint: Altered Mental Status  . ED Diagnosis:   Final diagnoses:   Encephalopathy   Confusion     Allergies:   Allergies   Allergen Reactions     Diagnostic X-Ray Materials Hives     PATIENT HAD HIVE REACTION AFTER ADMINISTERING CT CONTRAST DYE.       Contrast Dye        Code Status: Full Code  Activity level - Baseline/Home:  Independent. Activity Level - Current:   Unable to Assess. Lift room needed: No. Bariatric: No   Needed: No   Isolation: No. Infection: Not Applicable.     Vital Signs:   Vitals:    10/25/21 1042   BP: 139/84   Pulse: 88   Resp: 18   Temp: 97.7  F (36.5  C)   TempSrc: Oral   SpO2: 99%       Cardiac Rhythm:  ,      Pain level:    Patient confused: Yes. Patient Falls Risk: Yes.   Elimination Status: Has not needed to void in ED   Patient Report - Initial Complaint: AMS. Focused Assessment: A&O to self, place, and situation, pt does know the month but not the year. She is on the kidney transplant wait list. She is noted to be jaundiced with yellow sclera. Her abdomen is distended.    Tests Performed: lab, imaging. Abnormal Results:   Labs Ordered and Resulted from Time of ED Arrival Up to the Time of Departure from the ED   COMPREHENSIVE METABOLIC PANEL - Abnormal; Notable for the following components:       Result Value    Sodium 132 (*)     Glucose 122 (*)     Alkaline Phosphatase 308 (*)     AST 59 (*)     Albumin 2.5 (*)     Bilirubin Total 5.8 (*)     All other components within normal limits   CBC WITH PLATELETS AND DIFFERENTIAL - Abnormal; Notable for the following components:    WBC Count 11.6 (*)     RBC Count 3.47 (*)     Hemoglobin 10.5 (*)     Hematocrit 32.4 (*)     RDW 15.2 (*)     Absolute Neutrophils 10.2 (*)     Absolute Lymphocytes 0.5 (*)     Absolute Immature Granulocytes 0.1 (*)     All other components within normal limits   ISTAT GASES LACTATE VENOUS POCT -  Abnormal; Notable for the following components:    Lactic Acid POCT 2.4 (*)     O2 Sat, Venous POCT 37 (*)     pCO2V Venous POCT 31 (*)     pH Venous POCT 7.46 (*)     pO2 Venous POCT 20 (*)     All other components within normal limits   LIPASE - Normal   AMMONIA - Normal   MAGNESIUM - Normal   COVID-19 VIRUS (CORONAVIRUS) BY PCR - Normal    Narrative:     Testing was performed using the sal  SARS-CoV-2 & Influenza A/B Assay on the sal  Pamella  System.  This test should be ordered for the detection of SARS-COV-2 in individuals who meet SARS-CoV-2 clinical and/or epidemiological criteria. Test performance is unknown in asymptomatic patients.  This test is for in vitro diagnostic use under the FDA EUA for laboratories certified under CLIA to perform moderate and/or high complexity testing. This test has not been FDA cleared or approved.  A negative test does not rule out the presence of PCR inhibitors in the specimen or target RNA in concentration below the limit of detection for the assay. The possibility of a false negative should be considered if the patient's recent exposure or clinical presentation suggests COVID-19.  [facility] are certified under the Clinical Laboratory Improvement Amendments of 1988 (CLIA-88) as qualified to perform moderate and/or high complexity laboratory testing.   LACTIC ACID WHOLE BLOOD - Normal   ROUTINE UA WITH MICROSCOPIC REFLEX TO CULTURE   ORTHOSTATIC BLOOD PRESSURE AND PULSE   BLOOD CULTURE   BLOOD CULTURE   CBC WITH PLATELETS & DIFFERENTIAL    Narrative:     The following orders were created for panel order CBC with platelets differential.  Procedure                               Abnormality         Status                     ---------                               -----------         ------                     CBC with platelets and d...[[id number]]  Abnormal            Final result                 Please view results for these tests on the individual  orders.     CT Head w/o Contrast   Final Result   IMPRESSION:   1.  No evidence of acute intracranial hemorrhage or mass effect.   2.  Partially calcified mass overlying the left occipital calvarium   measuring 1.1 x 1.8 cm, most likely representing a sebaceous cyst.   There are additional subcutaneous lesions within the posterior scalp   which also most likely represent sebaceous cysts. Clinical correlation   is advised.      ELMA MEDRANO MD            SYSTEM ID:  [id number]        .   Treatments provided: See MAR  Family Comments: None  OBS brochure/video discussed/provided to patient:  N/A  ED Medications:   Medications   0.9% sodium chloride BOLUS (0 mLs Intravenous Stopped 10/25/21 1254)     Drips infusing:  No  For the majority of the shift, the patient's behavior Green. Interventions performed were None.    Sepsis treatment initiated: No     Patient tested for COVID 19 prior to admission: YES    ED Nurse Name/Phone Number: Royce Miller RN,   2:30 PM    RECEIVING UNIT ED HANDOFF REVIEW    Above ED Nurse Handoff Report was reviewed: Yes  Reviewed by: Sarai Clemens RN on October 25, 2021 at 3:36 PM

## 2021-10-25 NOTE — ED PROVIDER NOTES
History   Chief Complaint:  Altered Mental Status       HPI   Berta Nava is a 56 year old female with history of sclerosing cholangitis, hypertension, hyperlipidemia, and acute kidney failure who presents with altered mental status. The patient's daughter called EMS because the patient appeared more confused. She denies any falls, numbness, or tingling. Also denies blood in stool, chest pain, shortness of breath, cough, rhinorrhea, and sore throat. Has been eating and drinking normally.        Per discussion with daughter and , patient reportedly was normal last night.  This morning they went to go get her to go to her appointment and she was confused sitting on the bed in her own urine.  No falls or trauma.  No seizure activity noted.  Patient continues to be confused.      Review of Systems   All other systems reviewed and are negative.      Allergies:  Diagnostic X-Ray Materials  Contrast Dye    Medications:  Ciprofloxacin  Furosemide  Omeprazole  Spironolactone  Sucralfate  Ursodiol    Past Medical History:     Ascites  Biliary cirrhosis  Cholangitis, sclerosing  Hearing loss of left ear  Hyperlipidemia  Hypertension  Spontaneous bacterial peritonitis  Sjogren's syndrome  Ulcerative colitis  Ulcerative pancolitis  Cholangitis  Hypokalemia  Chronic fatigue  Sicca syndrome  Sepsis  Choledocholithiasis  Biliary stent  Secondary esophageal varices without bleeding  Liver transplant candidate  Acute kidney failure  Severe protein-calorie malnutrition      Past Surgical History:    Coronary angiogram  Endoscopic retrograde cholangiopancreatogram  EGD combined  Bilateral fallopian tubes and ovaries removed    Family History:    Mother: angiosarcoma  Father: CAD, heart transplant    Social History:  Presents alone  Lives with  at home    Physical Exam     Patient Vitals for the past 24 hrs:   BP Temp Temp src Pulse Resp SpO2   10/25/21 1042 139/84 97.7  F (36.5  C) Oral 88 18 99 %       Physical  Exam  General: Resting on the bed.  Head: No obvious trauma to head.  Ears, Nose, Throat:  External ears normal.  Nose normal  Eyes:  Conjunctival icterus.  Pupils are equal, round, and reactive.   Neck: Normal range of motion.  Neck supple.   CV: Regular rate and rhythm.  No murmurs.      Respiratory: Effort normal and breath sounds normal.  No wheezing or crackles.   Gastrointestinal: Soft.  + distension. There is no tenderness.    Neuro: Alert to self, not oriented to time. Moving all extremities appropriately.  Normal speech.  CN II-XII grossly intact.  Gross muscle strength intact of the proximal and distal bilateral upper and lower extremities.  Sensation intact to light touch in all 4 extremities.   Skin: Skin is warm and dry.  No rash noted.   Psych: Normal mood and affect. Behavior is normal.       Emergency Department Course   ECG  ECG taken at 1310, ECG read at 1320  Normal sinus rhythm  Prolonged QT  Abnormal ECG   No significant change as compared to prior, dated 10/07/21.  Rate 84 bpm. SC interval 160 ms. QRS duration 96 ms. QT/QTc 414/489 ms. P-R-T axes -2 40 16.     Imaging:  CT Head w/o Contrast  1.  No evidence of acute intracranial hemorrhage or mass effect.  2.  Partially calcified mass overlying the left occipital calvarium  measuring 1.1 x 1.8 cm, most likely representing a sebaceous cyst.  There are additional subcutaneous lesions within the posterior scalp  which also most likely represent sebaceous cysts. Clinical correlation  is advised.    ELMA MEDRANO MD     Report per radiology    Laboratory:  CBC: WBC 11.6 (H), HGB 10.5 (L),     CMP: Sodium 132 (L), Glucose 122 (H), Bilirubin Total 5.8 (H), Albumin 2.5 (L), Alkaline Phosphate 308 (H), AST 59 (H) o/w WNL (Creatinine 0.73)    iStat Gases venous: LACTW: 2.4 (H), O2 Sat: 37 (L), pCO2V: 31 (L), pH venous: 7.46 (H), pO2 venous: 20 (L)    Lipase: 248    Magnesium: 2.1    Ammonia: 33    UA with Microscopic: Pending    Blood Cultures  x2: Pending    Asymptomatic COVID-19 Virus (Coronavirus) by PCR Nasopharyngeal swab: Pending    Lactic acid (result time 1307) 1.6     Emergency Department Course:  Reviewed:  I reviewed nursing notes, vitals, past medical history and Care Everywhere    Assessments:  1045 I obtained history and examined the patient as noted above.    1243 I spoke with the patient's .    1254 I rechecked the patient and explained findings.    Consults:  1315 I spoke with Vania Sarmiento PA-C, regarding the patient's presentation.    Interventions:  1139 0.9% sodium chloride 500 mL IV    Disposition:  The patient was admitted to the hospital under the care of Dr. Gates.     Impression & Plan     CMS Diagnoses: The Lactic acid level is elevated due to dehydration and liver disesae, at this time there is no sign of severe sepsis or septic shock. and None       Medical Decision Makin-year-old female with a history of primary sclerosing cholangitis and cirrhosis presents with confusion.  Vital signs are stable.  Broad differential was pursued include not limited to stroke, intracranial hemorrhage, mass, encephalopathy, electrolyte, metabolic, renal dysfunction, dehydration, sepsis, etc.  CBC shows mild leukocytosis although this appears to be chronic.  Stable hemoglobin.  BMP without acute electrolyte, metabolic or renal dysfunction.  Baseline elevation in bilirubin.  Lactate minimally elevated, suspect secondary to dehydration as this cleared with fluids alone.  No signs of sepsis at this time.  Patient also has underlying liver disease likely contributing to the lactic acidosis.  Lipase normal not suggestive of pancreatitis.  Magnesium normal.  Patient has a completely benign abdominal exam.  Do not see evidence of acute intra-abdominal process.  No signs of spontaneous bacterial peritonitis.  Given patient's underlying pathology did obtain urine cultures and blood cultures.  Lactate cleared with fluids.  Ammonia level normal  although cannot completely exclude hepatic encephalopathy.  Head CT without evidence of acute internal hemorrhage, mass, herniation.  Nonfocal neurologic exam.  No signs of stroke.  EKG showing sinus rhythm.  At this point suspect likely hepatic encephalopathy versus other metabolic encephalopathy.  Plan to admit for continued cares.  Discussed with the hospitalist who graciously excepted.      Diagnosis:    ICD-10-CM    1. Encephalopathy  G93.40    2. Confusion  R41.0        Discharge Medications:  New Prescriptions    No medications on file       Scribe Disclosure:  I, Luan Perdomo, am serving as a scribe at 10:41 AM on 10/25/2021 to document services personally performed by Darling Carlos MD based on my observations and the provider's statements to me.            Darling Carlos MD  10/25/21 [id number]

## 2021-10-25 NOTE — ED NOTES
Bed: ED09  Expected date: 10/25/21  Expected time: 10:30 AM  Means of arrival: Ambulance  Comments:  BV1  56F  AMS

## 2021-10-25 NOTE — PHARMACY-ADMISSION MEDICATION HISTORY
"Admission medication history interview status for this patient is complete. See [facility] admission navigator for allergy information, prior to admission medications and immunization status.     Medication history interview done, indicate source(s): Patient and Family  Medication history resources (including written lists, pill bottles, clinic record):Armen  Pharmacy: Discharge Pharmacy    Changes made to PTA medication list:  Added: none  Changed: none  Reported as Not Taking: none  Removed: none    Actions taken by pharmacist (provider contacted, etc):left sticky note for provider     Additional medication history information: Patient reported taking sucralfate intermittently. Patient confirmed currently not taking simvastatin, potassium, mesalamine, or Bactrim.    Medication reconciliation/reorder completed by provider prior to medication history?  N    Prior to Admission medications    Medication Sig Last Dose Taking? Auth Provider   ciprofloxacin (CIPRO) 500 MG tablet Take 1 tablet (500 mg) by mouth every 24 hours 10/24/2021 at PM Yes Leventhal, Thomas Michael, MD   furosemide (LASIX) 20 MG tablet Take 2 tablets (40 mg) by mouth daily 10/24/2021 at AM Yes Leventhal, Thomas Michael, MD   multivitamin (CENTRUM SILVER) tablet Take 1 tablet by mouth daily 10/24/2021 at AM Yes Unknown, Entered By History   omeprazole (PRILOSEC) 20 MG DR capsule Take 2 capsules (40 mg) by mouth 2 times daily 10/24/2021 at PM Yes Delmy Beasley MD   polyethylene glycol (MIRALAX) 17 GM/Dose powder Take 17 g by mouth daily  Patient taking differently: Take 17 g by mouth daily as needed   at PRN Yes Augustnie Pena MD   spironolactone (ALDACTONE) 50 MG tablet Take 2 tablets (100 mg) by mouth daily 10/24/2021 at AM Yes Leventhal, Thomas Michael, MD   sucralfate (CARAFATE) 1 GM/10ML suspension Take 10 mLs (1 g) by mouth 4 times daily Past Week at per pt, taking \"sporadically\" Yes Delmy Beasley MD   ursodiol (ACTIGALL) 300 MG capsule " Take 1 capsule (300 mg) by mouth 2 times daily 10/24/2021 at PM Yes Leventhal, Thomas Michael, MD   lactulose (CEPHULAC) 10 GM packet Take 2 packets (20 g) by mouth daily as needed for constipation  Patient not taking: Reported on 10/7/2021   Augustine Pena MD

## 2021-10-25 NOTE — ED TRIAGE NOTES
A&O to self and situation, ABCs intact. Pt presents with EMS for altered mental status. EMS reports that pt's daughter called EMS because pt appeared more confused. Pt is on a liver transplant list.

## 2021-10-25 NOTE — H&P
[facility]    Hospitalist History and Physical    Name: Berta Nava    MRN: [id number]  YOB: 1965    Age: 56 year old  Date of Admission:  10/25/2021  Date of Service (when I saw the patient): 10/25/21    Assessment & Plan   Berta Nava is a 56 year old female with a complex medical history including hypertension, microscopic colitis, ulcerative colitis, pancreatic mucinous cystadenoma, primary sclerosing cholangitis, cirrhosis complicated by ascites, and was recently admitted for liver transplantation which was aborted given wide-complex tachycardia who presents to the ED on 10/25/2021 due to concern by patient's daughter and  for altered mental status.    ED work-up reveals: VSS, orthostatics negative, room 132, albumin of 2.5, total bilirubin of 5.8, alkaline phosphatase of 308, ALT of 39, AST of 59, ammonia of 33, lipase of 248, glucose of 122, mild leukocytosis of 11.6, hemoglobin 10.5, CT of head negative for acute intracranial hemorrhage or mass-effect, partially calcified mass overlying the left occipital calvarium measuring 1.1 x 1.8 cm, most likely representing a sebaceous cyst, additional subcutaneous lesions within the posterior scalp, most likely represent sebaceous cysts, COVID negative, blood cultures obtained and pending, UA unremarkable, and EKG shows rate of 84 bpm and NSR with prolonged QT of 489.    #Acute encephalopathy: acute onset of confusion this morning where patient had an episode of urinary incontinence, trying to use candy as her phone, and saying her phone is broken when it was working per usual. In her usual state of health per daughter and  yesterday. No prior episodes of confusion like this. CT of head negative for acute pathology. Low suspicion for stroke with no focal neurologic deficits appreciated on exam. Mild leukocytosis but afebrile and UA negative. Ammonia level of 33. Does not endorse any infectious symptoms.  Currently differential includes infectious versus hepatic encephalopathy. Will need to rule out SBP with ESLD history with known ascites requiring weekly paracentesis.    - serial neuro checks, monitor for clearing  - ultrasound guided therapeutic and diagnostic paracenetesis with labs ordered for tomorrow to assess for SBP  - check CXR to rule out pneumonia for completeness sake  - monitor for fevers  - start Lactulose 20 mg BID, hold if having >3-4 loose stools per day  - consult [facility] to assist with management    - continue PTA prophylactic PO Ciprofloxacin daily   - continue PTA Lasix, Spironolactone, and Ursodiol   - recheck CMP and CBC in AM    #End stage liver disease secondary to primary sclerosing cholangitis: primarily follows with Dr. Leventhal of GI at [facility]. Most recently presented to the [facility] on 8/19 for liver transplantation but this was aborted due to intraoperative arrhythmia.  Patient is currently relisted for transplant following negative cardiac work-up and infectious work-up during that hospital stay.  Previously on lactulose while admitted at the [facility] but no longer taking this daily. She requires paracentesis weekly, usually performed on Tuesdays. Requires ERCP with stone clearance and stent exchange about every 4 weeks as bridge to liver transplant, last performed on 10/1.   - last paracentesis on 10/19 where 6.4 liters were removed   - will have therapeutic and diagnostic paracentesis tomorrow, refer to above    #Hyponatremia: chronic, mild with sodium of 132. Received 500 ml bolus of NS in the ED.   - hold off on any further IVFs  - recheck sodium in AM    #HTN: no longer on antihypertensives     #H/o intraoperative V-tach (08/2021): during recent liver transplant in 08/2021 the patient had intraoperative Vtach requiring 4 rounds of defibrillation. Echocardiogram on 8/19 showed EF of 60-65% with global RV function normal and no effusion.  Patient underwent left heart cath and an MRI of the  heart which did not demonstrate any overt abnormalities that would have predisposed her to her arrhythmia.  He was treated with amiodarone during her hospital course, which EP felt safe discontinuing.  Per most recent follow-up by EP on 10/7 it is recommended to avoid QT prolonging medications and to ensure adequate potassium and magnesium levels.     #Anemia of chronic disease: currently stable at 10.5 with no signs of active bleeding     #H/o ulcerative colitis: no longer on mesalamine, monitored by GI as outpatient     Clinically Significant Risk Factors Present on Admission         # Hyponatremia: Na = 132 mmol/L (Ref range: 133 - 144 mmol/L) on admission, will monitor as appropriate         COVID: tested negative on 10/25/21  DVT Prophylaxis: Pneumatic Compression Devices  Code Status: Full Code, discussed with patient and daughter at bedside  Disposition: Expected stay >2 midnights, will admit to inpatient     Primary Care Physician   Tila Nevarez    Chief Complaint   Altered mental status    History obtained from discussion with ED provider, Dr. Carlos, chart review, and interview with patient which is limited at times due to confusion. Patient's daughter Karin is at the bedside to provide additional history.     History of Present Illness   Berta Nava is a 56 year old female who presents from home for evaluation of altered mental status.  The patient reportedly woke up confused this morning.  Patient's daughter at the bedside states that she thought candy were her glasses and that her phone was broken even though it was working properly. The patient was suppose to have an eye appointment at 9 AM but was unable to get there.  Yesterday the patient was at her baseline with no evidence of confusion per family.  The patient usually gets a paracentesis weekly due to ascites from her liver disease.  Patient is due tomorrow to have a paracentesis. Usually by Sunday or Monday the patient is quite  "uncomfortable due to ascites accumulation but this week has not been too bad with only reporting moderate distention without pain.  The patient had some slurred speech that lasted approximately 10 minutes this morning without recurrence.  Patient states that she is spitting up last night with mild gagging but no true emesis.  Denies headache, numbness, tingling, dizziness, chest pain, shortness of breath, abdominal pain, rash, diarrhea, nausea, fever, or chills.  The patient last had a bowel movement yesterday that was normal.  The patient states that she has been \"pooping balls\" that have not been hard to pass and doesn't feel like she is constipated.  She is not on daily lactulose.  Denies any recent sick contacts or travel.  Denies any new medications.  She is currently on the transplant list for liver after complications during her recent liver transplant attempt at the [facility] involving intraoperative arrhythmia.  Denies any URI symptoms.  The patient states she previously had a feeding tube after her last hospital stay but this has been removed since she has been trying to drink more protein shakes on her own. The patient's daughter states her confusion has not improved since this morning. No prior episodes of confusion like this.     Past Medical History    Past Medical History:   Diagnosis Date     Ascites      Biliary cirrhosis (H)      Cholangitis, sclerosing      Cirrhosis of liver with ascites (H) 3/3/2021     Hearing loss of left ear     wears a hearing aide     History of low potassium      Hyperlipidemia      Hypertension      SBP (spontaneous bacterial peritonitis) (H) 4/30/2021     Sjogren's syndrome (H)      Ulcerative colitis (H)      Ulcerative pancolitis (H)      Past Surgical History   Past Surgical History:   Procedure Laterality Date     CV CORONARY ANGIOGRAM N/A 8/25/2021    Procedure: Coronary Angiogram with possible intervention;  Surgeon: David Wilhelm MD;  Location: [facility] " CARDIAC CATH LAB     ENDOSCOPIC RETROGRADE CHOLANGIOPANCREATOGRAM N/A 6/25/2021    Procedure: ENDOSCOPIC RETROGRADE CHOLANGIOPANCREATOGRAPHY with ballon sweep of bile ducts for stones, balloon dilation of bile ducts and bile duct stent placement;  Surgeon: Gregory Gabriel MD;  Location: [facility] OR     ENDOSCOPIC RETROGRADE CHOLANGIOPANCREATOGRAM N/A 7/22/2021    Procedure: ENDOSCOPIC RETROGRADE CHOLANGIOPANCREATOGRAPHY STONE REMOVAL, DILATION AND STENT PLACEMENT;  Surgeon: Gregory Gabriel MD;  Location: [facility] OR     ENDOSCOPIC RETROGRADE CHOLANGIOPANCREATOGRAPHY, EXCHANGE TUBE/STENT N/A 05/05/2021    Procedure: ENDOSCOPIC RETROGRADE CHOLANGIOPANCREATOGRAPHY WITH STENT EXCHANGE, STONE EXTRACTION, AND DILATION;  Surgeon: Gregory Gabriel MD;  Location: [facility] OR     ENDOSCOPIC RETROGRADE CHOLANGIOPANCREATOGRAPHY, EXCHANGE TUBE/STENT N/A 5/10/2021    Procedure: ENDOSCOPIC RETROGRADE CHOLANGIOPANCREATOGRAPHY with biliary dilation, stone removal, stent exchange;  Surgeon: Gregory Gabriel MD;  [facility]: [facility]     ENDOSCOPIC RETROGRADE CHOLANGIOPANCREATOGRAPHY, EXCHANGE TUBE/STENT N/A 6/10/2021    Procedure: ENDOSCOPIC RETROGRADE CHOLANGIOPANCREATOGRAPHY, WITH biliary stent exchange, stone removal;  Surgeon: Woodrow Lott MD;  Location: [facility]     ENDOSCOPIC RETROGRADE CHOLANGIOPANCREATOGRAPHY, EXCHANGE TUBE/STENT N/A 8/30/2021    Procedure: ENDOSCOPIC RETROGRADE CHOLANGIOPANCREATOGRAPHY with biliary dilation, stone removal, stent exchange;  Surgeon: Gregory Gabriel MD;  [facility]: [facility]     ENDOSCOPIC RETROGRADE CHOLANGIOPANCREATOGRAPHY, EXCHANGE TUBE/STENT N/A 10/1/2021    Procedure: ENDOSCOPIC RETROGRADE CHOLANGIOPANCREATOGRAPHY with balloon sweep of bile ducts, bile duct stent exchanged;  Surgeon: Grgeory Gabriel MD;  Location: [facility] OR     ESOPHAGOSCOPY, GASTROSCOPY, DUODENOSCOPY (EGD), COMBINED N/A 9/4/2021    Procedure: ESOPHAGOGASTRODUODENOSCOPY (EGD);  Surgeon: Leventhal, Thomas Michael, MD;  [facility]     ESOPHAGOSCOPY,  "GASTROSCOPY, DUODENOSCOPY (EGD), COMBINED N/A 10/1/2021    Procedure: ESOPHAGOGASTRODUODENOSCOPY (EGD) with varices banding;  Surgeon: Gregory Gabriel MD;  Location: [facility] OR     GYN SURGERY      bilat fallopian tubes and ovaries removed     PICC DOUBLE LUMEN PLACEMENT Right 2021    42cm (2cm external), Lateral brachial vein     TRANSPLANT LIVER RECIPIENT  DONOR N/A 2021    Procedure: Opening of abdomen, Abdominal Exploration and aborted liver transplant.;  Surgeon: Ernesto Schmitt MD;  Location: [facility] OR     Prior to Admission Medications   Prior to Admission Medications   Prescriptions Last Dose Informant Patient Reported? Taking?   ciprofloxacin (CIPRO) 500 MG tablet 10/24/2021 at PM  No Yes   Sig: Take 1 tablet (500 mg) by mouth every 24 hours   furosemide (LASIX) 20 MG tablet 10/24/2021 at AM  No Yes   Sig: Take 2 tablets (40 mg) by mouth daily   lactulose (CEPHULAC) 10 GM packet   No No   Sig: Take 2 packets (20 g) by mouth daily as needed for constipation   Patient not taking: Reported on 10/7/2021   multivitamin (CENTRUM SILVER) tablet 10/24/2021 at AM  Yes Yes   Sig: Take 1 tablet by mouth daily   omeprazole (PRILOSEC) 20 MG DR capsule 10/24/2021 at PM  No Yes   Sig: Take 2 capsules (40 mg) by mouth 2 times daily   polyethylene glycol (MIRALAX) 17 GM/Dose powder  at PRN  No Yes   Sig: Take 17 g by mouth daily   Patient taking differently: Take 17 g by mouth daily as needed    spironolactone (ALDACTONE) 50 MG tablet 10/24/2021 at AM  No Yes   Sig: Take 2 tablets (100 mg) by mouth daily   sucralfate (CARAFATE) 1 GM/10ML suspension Past Week at per pt, taking \"sporadically\"  No Yes   Sig: Take 10 mLs (1 g) by mouth 4 times daily   ursodiol (ACTIGALL) 300 MG capsule 10/24/2021 at PM  No Yes   Sig: Take 1 capsule (300 mg) by mouth 2 times daily      Facility-Administered Medications: None     Allergies   Allergies   Allergen Reactions     Diagnostic [employer] Hives     PATIENT HAD HIVE " REACTION AFTER ADMINISTERING CT CONTRAST DYE.       Contrast Dye      Social History   Social History     Tobacco Use     Smoking status: Never Smoker     Smokeless tobacco: Never Used   Substance Use Topics     Alcohol use: No     Comment: Last drink was 2017     Social History     Social History Narrative     Not on file     Family History   Family history reviewed with patient and is noncontributory.    Review of Systems   A Comprehensive greater than 10 system review of systems was carried out.  Pertinent positives and negatives are noted above.  Otherwise negative for contributory information.    Physical Exam   Temp: 97.7  F (36.5  C) Temp src: Oral BP: 126/75 Pulse: 76   Resp: 18 SpO2: 100 % O2 Device: None (Room air)    Vital Signs with Ranges  Temp:  [97.7  F (36.5  C)] 97.7  F (36.5  C)  Pulse:  [] 76  Resp:  [18] 18  BP: (111-139)/(67-84) 126/75  SpO2:  [92 %-100 %] 100 %  0 lbs 0 oz    GEN:  Alert, oriented to person and place but not time, repeatedly asks the same question, appears comfortable laying on gurney, no overt distress  HEENT:  Normocephalic/atraumatic, scleral icterus, no nasal discharge, mouth moist.  CV:  Regular rate and rhythm, no murmur or JVD.  S1 + S2 noted, no S3 or S4.  LUNGS:  Clear to auscultation bilaterally without rales/rhonchi/wheezing/retractions.  Symmetric chest rise on inhalation noted.  ABD:  Active bowel sounds, soft, moderate distention.  No rebound/guarding/rigidity.  EXT:  No edema.  No cyanosis.  No acute joint synovitis noted.  SKIN:  Dry to touch, no exanthems noted in the visualized areas. Jaundice.  NEURO:  Symmetric muscle strength, sensation to touch grossly intact. Coordination symmetric on general exam.  No new focal deficits appreciated.    Data   Data reviewed today:  I personally reviewed the EKG tracing showing rate of 84 bpm and NSR with prolonged QT of 489.    Results for orders placed or performed during the hospital encounter of 10/25/21   CT  Head w/o Contrast     Status: None    Narrative    CT HEAD W/O CONTRAST 10/25/2021 12:09 PM    INDICATION: Mental status change, unknown cause  TECHNIQUE: CT scan of the head without contrast. Dose reduction  techniques were used.  CONTRAST:  None.  COMPARISON: None.    FINDINGS:   No evidence of acute intracranial hemorrhage or mass effect. Brain  attenuation morphology are normal. The ventricles and sulci are normal  for age. Normal gray-white matter differentiation. The basilar  cisterns are patent.    The globes are unremarkable. The partially imaged paranasal sinuses,  mastoid air cells and middle ear cavities are unremarkable. The  visualized skull base and calvarium are unremarkable. Partially  calcified mass overlying the left occipital calvarium measuring 1.1 x  1.8 cm, most likely representing a sebaceous cyst. There are  additional subcutaneous lesions within the posterior scalp which also  most likely represent sebaceous cysts. Clinical correlation is  advised.      Impression    IMPRESSION:  1.  No evidence of acute intracranial hemorrhage or mass effect.  2.  Partially calcified mass overlying the left occipital calvarium  measuring 1.1 x 1.8 cm, most likely representing a sebaceous cyst.  There are additional subcutaneous lesions within the posterior scalp  which also most likely represent sebaceous cysts. Clinical correlation  is advised.    ELMA MEDRANO MD         SYSTEM ID:  [id number]   Comprehensive metabolic panel     Status: Abnormal   Result Value Ref Range    Sodium 132 (L) 133 - 144 mmol/L    Potassium 4.1 3.4 - 5.3 mmol/L    Chloride 98 94 - 109 mmol/L    Carbon Dioxide (CO2) 22 20 - 32 mmol/L    Anion Gap 12 3 - 14 mmol/L    Urea Nitrogen 29 7 - 30 mg/dL    Creatinine 0.73 0.52 - 1.04 mg/dL    Calcium 9.8 8.5 - 10.1 mg/dL    Glucose 122 (H) 70 - 99 mg/dL    Alkaline Phosphatase 308 (H) 40 - 150 U/L    AST 59 (H) 0 - 45 U/L    ALT 39 0 - 50 U/L    Protein Total 7.9 6.8 - 8.8 g/dL     Albumin 2.5 (L) 3.4 - 5.0 g/dL    Bilirubin Total 5.8 (H) 0.2 - 1.3 mg/dL    GFR Estimate >90 >60 mL/min/1.73m2   Lipase     Status: Normal   Result Value Ref Range    Lipase 248 73 - 393 U/L   Ammonia     Status: Normal   Result Value Ref Range    Ammonia 33 10 - 50 umol/L   Magnesium     Status: Normal   Result Value Ref Range    Magnesium 2.1 1.6 - 2.3 mg/dL   CBC with platelets and differential     Status: Abnormal   Result Value Ref Range    WBC Count 11.6 (H) 4.0 - 11.0 10e3/uL    RBC Count 3.47 (L) 3.80 - 5.20 10e6/uL    Hemoglobin 10.5 (L) 11.7 - 15.7 g/dL    Hematocrit 32.4 (L) 35.0 - 47.0 %    MCV 93 78 - 100 fL    MCH 30.3 26.5 - 33.0 pg    MCHC 32.4 31.5 - 36.5 g/dL    RDW 15.2 (H) 10.0 - 15.0 %    Platelet Count 338 150 - 450 10e3/uL    % Neutrophils 87 %    % Lymphocytes 4 %    % Monocytes 7 %    % Eosinophils 1 %    % Basophils 0 %    % Immature Granulocytes 1 %    NRBCs per 100 WBC 0 <1 /100    Absolute Neutrophils 10.2 (H) 1.6 - 8.3 10e3/uL    Absolute Lymphocytes 0.5 (L) 0.8 - 5.3 10e3/uL    Absolute Monocytes 0.8 0.0 - 1.3 10e3/uL    Absolute Eosinophils 0.1 0.0 - 0.7 10e3/uL    Absolute Basophils 0.0 0.0 - 0.2 10e3/uL    Absolute Immature Granulocytes 0.1 (H) <=0.0 10e3/uL    Absolute NRBCs 0.0 10e3/uL   iStat Gases (lactate) venous, POCT     Status: Abnormal   Result Value Ref Range    Lactic Acid POCT 2.4 (H) <=2.0 mmol/L    Bicarbonate Venous POCT 22 21 - 28 mmol/L    O2 Sat, Venous POCT 37 (L) 94 - 100 %    pCO2V Venous POCT 31 (L) 40 - 50 mm Hg    pH Venous POCT 7.46 (H) 7.32 - 7.43    pO2 Venous POCT 20 (L) 25 - 47 mm Hg   Asymptomatic COVID-19 Virus (Coronavirus) by PCR Nasopharyngeal     Status: Normal    Specimen: Nasopharyngeal; Swab   Result Value Ref Range    SARS CoV2 PCR Negative Negative    Narrative    Testing was performed using the sal  SARS-CoV-2 & Influenza A/B Assay on the sal  [employer]  System.  This test should be ordered for the detection of SARS-COV-2 in individuals who  meet SARS-CoV-2 clinical and/or epidemiological criteria. Test performance is unknown in asymptomatic patients.  This test is for in vitro diagnostic use under the FDA EUA for laboratories certified under CLIA to perform moderate and/or high complexity testing. This test has not been FDA cleared or approved.  A negative test does not rule out the presence of PCR inhibitors in the specimen or target RNA in concentration below the limit of detection for the assay. The possibility of a false negative should be considered if the patient's recent exposure or clinical presentation suggests COVID-19.  [facility] are certified under the Clinical Laboratory Improvement Amendments of 1988 (CLIA-88) as qualified to perform moderate and/or high complexity laboratory testing.   Lactic acid whole blood     Status: Normal   Result Value Ref Range    Lactic Acid 1.6 0.7 - 2.0 mmol/L   EKG 12-lead, tracing only     Status: None (Preliminary result)   Result Value Ref Range    Systolic Blood Pressure  mmHg    Diastolic Blood Pressure  mmHg    Ventricular Rate 84 BPM    Atrial Rate 84 BPM    IL Interval 160 ms    QRS Duration 96 ms     ms    QTc 489 ms    P Axis -2 degrees    R AXIS 40 degrees    T Axis 16 degrees    Interpretation ECG       Sinus rhythm  Prolonged QT  Abnormal ECG  When compared with ECG of 07-OCT-2021 14:20,  Nonspecific T wave abnormality no longer evident in Lateral leads     CBC with platelets differential     Status: Abnormal    Narrative    The following orders were created for panel order CBC with platelets differential.  Procedure                               Abnormality         Status                     ---------                               -----------         ------                     CBC with platelets and d...[[id number]]  Abnormal            Final result                 Please view results for these tests on the individual orders.     Vania YU I discussed the  patient with Dr. Gates and he agrees with the above plan.

## 2021-10-26 ENCOUNTER — APPOINTMENT (OUTPATIENT)
Dept: ULTRASOUND IMAGING | Facility: CLINIC | Age: 56
DRG: 442 | End: 2021-10-26
Attending: PHYSICIAN ASSISTANT
Payer: COMMERCIAL

## 2021-10-26 LAB
ALBUMIN SERPL-MCNC: 2.2 G/DL (ref 3.4–5)
ALP SERPL-CCNC: 285 U/L (ref 40–150)
ALT SERPL W P-5'-P-CCNC: 34 U/L (ref 0–50)
ANION GAP SERPL CALCULATED.3IONS-SCNC: 10 MMOL/L (ref 3–14)
APPEARANCE FLD: CLEAR
AST SERPL W P-5'-P-CCNC: 49 U/L (ref 0–45)
ATRIAL RATE - MUSE: 84 BPM
BASOPHILS # BLD AUTO: 0.1 10E3/UL (ref 0–0.2)
BASOPHILS NFR BLD AUTO: 0 %
BILIRUB SERPL-MCNC: 5.1 MG/DL (ref 0.2–1.3)
BUN SERPL-MCNC: 23 MG/DL (ref 7–30)
CALCIUM SERPL-MCNC: 8.9 MG/DL (ref 8.5–10.1)
CHLORIDE BLD-SCNC: 99 MMOL/L (ref 94–109)
CO2 SERPL-SCNC: 21 MMOL/L (ref 20–32)
COLOR FLD: YELLOW
CREAT SERPL-MCNC: 0.66 MG/DL (ref 0.52–1.04)
DIASTOLIC BLOOD PRESSURE - MUSE: NORMAL MMHG
EOSINOPHIL # BLD AUTO: 0.1 10E3/UL (ref 0–0.7)
EOSINOPHIL NFR BLD AUTO: 0 %
ERYTHROCYTE [DISTWIDTH] IN BLOOD BY AUTOMATED COUNT: 15.2 % (ref 10–15)
GFR SERPL CREATININE-BSD FRML MDRD: >90 ML/MIN/1.73M2
GLUCOSE BLD-MCNC: 99 MG/DL (ref 70–99)
GRAM STAIN RESULT: NORMAL
GRAM STAIN RESULT: NORMAL
HCT VFR BLD AUTO: 28.2 % (ref 35–47)
HGB BLD-MCNC: 9.2 G/DL (ref 11.7–15.7)
IMM GRANULOCYTES # BLD: 0.1 10E3/UL
IMM GRANULOCYTES NFR BLD: 1 %
INR PPP: 1.26 (ref 0.85–1.15)
INTERPRETATION ECG - MUSE: NORMAL
LYMPHOCYTES # BLD AUTO: 0.7 10E3/UL (ref 0.8–5.3)
LYMPHOCYTES NFR BLD AUTO: 5 %
LYMPHOCYTES NFR FLD MANUAL: 22 %
MCH RBC QN AUTO: 30.3 PG (ref 26.5–33)
MCHC RBC AUTO-ENTMCNC: 32.6 G/DL (ref 31.5–36.5)
MCV RBC AUTO: 93 FL (ref 78–100)
MONOCYTES # BLD AUTO: 1.1 10E3/UL (ref 0–1.3)
MONOCYTES NFR BLD AUTO: 8 %
MONOS+MACROS NFR FLD MANUAL: 75 %
NEUTROPHILS # BLD AUTO: 12 10E3/UL (ref 1.6–8.3)
NEUTROPHILS NFR BLD AUTO: 86 %
NEUTS BAND NFR FLD MANUAL: 3 %
NRBC # BLD AUTO: 0 10E3/UL
NRBC BLD AUTO-RTO: 0 /100
P AXIS - MUSE: -2 DEGREES
PLATELET # BLD AUTO: 298 10E3/UL (ref 150–450)
POTASSIUM BLD-SCNC: 4.3 MMOL/L (ref 3.4–5.3)
PR INTERVAL - MUSE: 160 MS
PROT SERPL-MCNC: 6.8 G/DL (ref 6.8–8.8)
QRS DURATION - MUSE: 96 MS
QT - MUSE: 414 MS
QTC - MUSE: 489 MS
R AXIS - MUSE: 40 DEGREES
RBC # BLD AUTO: 3.04 10E6/UL (ref 3.8–5.2)
SODIUM SERPL-SCNC: 130 MMOL/L (ref 133–144)
SYSTOLIC BLOOD PRESSURE - MUSE: NORMAL MMHG
T AXIS - MUSE: 16 DEGREES
VENTRICULAR RATE- MUSE: 84 BPM
WBC # BLD AUTO: 14 10E3/UL (ref 4–11)
WBC # FLD AUTO: 90 /UL

## 2021-10-26 PROCEDURE — 87205 SMEAR GRAM STAIN: CPT | Performed by: PHYSICIAN ASSISTANT

## 2021-10-26 PROCEDURE — 36415 COLL VENOUS BLD VENIPUNCTURE: CPT | Performed by: PHYSICIAN ASSISTANT

## 2021-10-26 PROCEDURE — 89050 BODY FLUID CELL COUNT: CPT | Performed by: PHYSICIAN ASSISTANT

## 2021-10-26 PROCEDURE — 87070 CULTURE OTHR SPECIMN AEROBIC: CPT | Performed by: PHYSICIAN ASSISTANT

## 2021-10-26 PROCEDURE — 85610 PROTHROMBIN TIME: CPT | Performed by: INTERNAL MEDICINE

## 2021-10-26 PROCEDURE — 89051 BODY FLUID CELL COUNT: CPT | Performed by: PHYSICIAN ASSISTANT

## 2021-10-26 PROCEDURE — 49083 ABD PARACENTESIS W/IMAGING: CPT

## 2021-10-26 PROCEDURE — 82040 ASSAY OF SERUM ALBUMIN: CPT | Performed by: PHYSICIAN ASSISTANT

## 2021-10-26 PROCEDURE — 120N000001 HC R&B MED SURG/OB

## 2021-10-26 PROCEDURE — 99233 SBSQ HOSP IP/OBS HIGH 50: CPT | Performed by: INTERNAL MEDICINE

## 2021-10-26 PROCEDURE — 272N000042 US PARACENTESIS

## 2021-10-26 PROCEDURE — 250N000009 HC RX 250: Performed by: RADIOLOGY

## 2021-10-26 PROCEDURE — 0W9G3ZZ DRAINAGE OF PERITONEAL CAVITY, PERCUTANEOUS APPROACH: ICD-10-PCS | Performed by: RADIOLOGY

## 2021-10-26 PROCEDURE — 250N000013 HC RX MED GY IP 250 OP 250 PS 637: Performed by: PHYSICIAN ASSISTANT

## 2021-10-26 PROCEDURE — 250N000011 HC RX IP 250 OP 636: Performed by: PHYSICIAN ASSISTANT

## 2021-10-26 PROCEDURE — 85025 COMPLETE CBC W/AUTO DIFF WBC: CPT | Performed by: PHYSICIAN ASSISTANT

## 2021-10-26 RX ADMIN — ONDANSETRON 4 MG: 4 TABLET, ORALLY DISINTEGRATING ORAL at 03:21

## 2021-10-26 RX ADMIN — URSODIOL 300 MG: 300 CAPSULE ORAL at 10:10

## 2021-10-26 RX ADMIN — SPIRONOLACTONE 100 MG: 100 TABLET ORAL at 10:10

## 2021-10-26 RX ADMIN — CIPROFLOXACIN HYDROCHLORIDE 500 MG: 250 TABLET, FILM COATED ORAL at 20:46

## 2021-10-26 RX ADMIN — LIDOCAINE HYDROCHLORIDE 10 ML: 10 INJECTION, SOLUTION EPIDURAL; INFILTRATION; INTRACAUDAL; PERINEURAL at 08:54

## 2021-10-26 RX ADMIN — OMEPRAZOLE 40 MG: 20 CAPSULE, DELAYED RELEASE ORAL at 18:45

## 2021-10-26 RX ADMIN — LACTULOSE 20 G: 20 SOLUTION ORAL at 20:46

## 2021-10-26 RX ADMIN — URSODIOL 300 MG: 300 CAPSULE ORAL at 20:46

## 2021-10-26 RX ADMIN — OMEPRAZOLE 40 MG: 20 CAPSULE, DELAYED RELEASE ORAL at 07:00

## 2021-10-26 RX ADMIN — FUROSEMIDE 40 MG: 40 TABLET ORAL at 10:10

## 2021-10-26 RX ADMIN — LACTULOSE 20 G: 20 SOLUTION ORAL at 10:10

## 2021-10-26 ASSESSMENT — ACTIVITIES OF DAILY LIVING (ADL)
ADLS_ACUITY_SCORE: 8

## 2021-10-26 ASSESSMENT — MIFFLIN-ST. JEOR: SCORE: 1190.97

## 2021-10-26 NOTE — PROGRESS NOTES
[facility]  Hospitalist Progress Note  Jerrod Gates MD 10/26/21    Reason for Stay (Diagnosis): Hepatic encephalopathy         Assessment and Plan:      Summary of Stay: Berta Nava is a 56 year old female with a complex past medical history including end-stage liver disease requiring weekly paracentesis, primary sclerosing cholangitis, ulcerative colitis, microscopic colitis, pancreatic mucinous cystadenoma, anemia, thrombocytopenia, and HTN who is currently awaiting liver transplantation who presented with isolated confusion of less than 24-hour duration and was admitted on 10/25/2021 with suspected hepatic encephalopathy.  She has not been taking lactulose as she has not had issues with encephalopathy in the past.  Has been on ciprofloxacin for SBP prophylaxis and has not had increased abdominal pain.  Urinalysis, chest x-ray not suspicious for infection.  Has chronic leukocytosis.  Vital signs stable in ER without fever.  Elevated LFTs similar to baseline as is her mild hyponatremia.  Ammonia level not elevated, however has asterixis on exam and classic encephalopathy findings on exam as well.  Admitted and started on lactulose and now has had a couple bowel movements with improving mental status.  Underwent paracentesis of 3.8 L out and cell count not suspicious for SBP.  [facility] consulted while here given her baseline end-stage liver disease.  Continuing lactulose overnight.    Problem List/Assessment and Plan:   Suspect hepatic encephalopathy: Presenting with confusion of less than 1 day duration specifically repeating herself with short-term memory loss and has another example inappropriately using items such as trying to use candy has a cell phone.  Her vital signs are stable and she is afebrile.  Is a chronic leukocytosis.  Urinalysis and chest x-ray not suspicious for infection.  Considered SBP, but she is on ciprofloxacin prophylaxis and now paracentesis done with 3.8 L out and  only 90 WBCs seen on cytology so this is not SBP.  Although her ammonia level is not elevated her clinical picture is consistent with hepatic encephalopathy and she has asterixis on exam in the ER.  She has not had hepatic encephalopathy so has not been taking lactulose and has been having small hard stools.  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  Was about to undergo liver transplant in August when she had episode of sustained V. tach requiring cardioversion in the OR so this was aborted.  Patient/family report negative cardiac work-up since.  Also, very recently was turned down for a potential liver as it was too large for her body size.  She follows with [facility] hepatology/transplant clinic.  She gets a weekly paracentesis on Tuesday usually large volume.  She takes furosemide and spironolactone at baseline.  She is also on ciprofloxacin SBP prophylaxis and ursodiol.  Has not been taking lactulose as she has not had hepatic encephalopathy in the past.  Her labs are at baseline with bilirubin 5-6, alk phos 200-320, and slight elevation in AST.  -[facility] consulted while here  -Close follow-up with [facility] hepatology/transplant clinic after discharge  -Resumed her furosemide/spironolactone along with ursodiol  -Resumed her ciprofloxacin 500 mg daily SBP prophylaxis    Chronic leukocytosis: Patient has chronically elevated white blood cell count in the 10-14 range, primarily neutrophils.  This has been present for months per lab review.  As above doubt any new infection at this time.    Anemia of chronic disease: Hemoglobin 9-10 range which I  "suspect is secondary to her end-stage liver disease.  No sign of bleeding recently.    Chronic mild hyponatremia: Sodium at baseline level of around 130 which is secondary to her end-stage liver disease.  She is on diuretics that have been resumed and sodium appears stable.  -BMP tomorrow    History ulcerative colitis and microscopic colitis: Not on any specific therapy for this at this time.    DVT Prophylaxis: Pneumatic Compression Devices  Code Status: Full Code  FEN: 2 g sodium restriction  Discharge Dispo: Home with family  Estimated Disch Date / # of Days until Disch: Home tomorrow if encephalopathy resolved with lactulose today and labs stable.        Interval History (Subjective):      2 stools overnight reported.  Denies diarrhea.  No abdominal pain.  Afebrile.  Denies nausea or vomiting.  She says her thinking feels better.  Spouse agrees, but she is not at baseline.  She did say that she was not ready to go home and then later during encounter stated she wanted to go home.  When trying to explain to her that she already said she did not feel she can go home today she does not remember saying this.  There is a few other things that she repeated during our encounter as well                  Physical Exam:      Last Vital Signs:  /78   Pulse 92   Temp 98.6  F (37  C) (Oral)   Resp 18   Ht 1.651 m (5' 5\")   Wt 61 kg (134 lb 8 oz)   LMP  (LMP Unknown)   SpO2 100%   BMI 22.38 kg/m        Intake/Output Summary (Last 24 hours) at 10/26/2021 1508  Last data filed at 10/26/2021 1100  Gross per 24 hour   Intake 480 ml   Output 400 ml   Net 80 ml       Constitutional: Awake, NAD, frail  Eyes: Scleral icterus present  HEENT:  MMM  Respiratory: no respiratory distress, lungs cta bilaterally, no crackles or wheeze  Cardiovascular: RRR.  No murmur   GI: Slight distention, but soft and nontender to palpation, bowel sounds present  Skin: Jaundiced  Musculoskeletal/extremities: No significant lower extremity " edema edema  Neurologic: Alert, more oriented today, did ask some repetitive questions and contradict herself at times during our encounter in the presence of her spouse, asterixis improved today  Psychiatric: calm, cooperative         Medications:      All current medications were reviewed with changes reflected in problem list.         Data:      All new lab and imaging data was reviewed.   Labs:  No results for input(s): CULT in the last 168 hours.  Recent Labs   Lab 10/26/21  0651 10/25/21  1046   WBC 14.0* 11.6*   HGB 9.2* 10.5*   HCT 28.2* 32.4*   MCV 93 93    338     Recent Labs   Lab 10/26/21  0651 10/25/21  1046 10/19/21  1654   * 132* 131*   POTASSIUM 4.3 4.1  --    CHLORIDE 99 98  --    CO2 21 22  --    ANIONGAP 10 12  --    GLC 99 122*  --    BUN 23 29  --    CR 0.66 0.73 0.90   GFRESTIMATED >90 >90 71   MARIELENA 8.9 9.8  --    MAG  --  2.1  --    PROTTOTAL 6.8 7.9  --    ALBUMIN 2.2* 2.5* 3.0*   BILITOTAL 5.1* 5.8* 6.1*   ALKPHOS 285* 308*  --    AST 49* 59*  --    ALT 34 39  --       INR 1.26  Cell count peritoneal fluid:  90 WBCs, 3% neutrophils    Imaging:   Recent Results (from the past 24 hour(s))   XR Chest 2 Views    Narrative    EXAM: XR CHEST 2 VW  LOCATION: [facility]  DATE/TIME: 10/25/2021 7:09 PM    INDICATION: Rule out pneumonia as source of confusion.  COMPARISON: 10/01/2021      Impression    IMPRESSION: Negative chest.   US Paracentesis    Narrative    [facility]  DATE: 10/26/2021    PROCEDURE: IMAGING GUIDED PARACENTESIS    INTERVENTIONAL RADIOLOGIST: Luan Pacheco MD.    INDICATION: Recurrent ascites.    CONSENT: The risks, benefits and alternatives of an imaging guided  paracentesis were discussed with the patient  in detail. All questions  were answered. Informed consent was given to proceed with the  procedure.    MODERATE SEDATION: None.    COMPLICATIONS: No immediate complications.    PROCEDURE:    A limited ultrasound was performed for  localization purposes.    Using sterile technique 10 mL of Xylocaine was infused into the local  soft tissues. Under direct ultrasound guidance an 8F catheter was  inserted into the ascitic fluid.    A total of 3800 mL of clear yellow ascitic fluid was removed and sent  to the lab if diagnostic analysis was requested.    FINDINGS:  The initial ultrasound shows a large amount of peritoneal ascites.    Images obtained during catheter placement show the access needle with  tip in the peritoneal ascites.      Impression    IMPRESSION:    Successful ultrasound-guided paracentesis, as discussed above.    ____________________________________________________________________    CPT codes for physician reference only:  33934      JULIAN ANDERSON MD         SYSTEM ID:  [id number]         Jerrod Gates MD

## 2021-10-26 NOTE — PROVIDER NOTIFICATION
MD paged about pt retaining urine. Did not void overnight and had no output when attempting to straight cath.

## 2021-10-26 NOTE — CONSULTS
[facility]  [facility]  Gastroenterology Consultation    Berta Nava  [address] [facility] [id number]  56 year old female    Admission Date/Time: 10/25/2021  Primary Care Provider: Tila Nevarez    We were asked to see the patient in consultation by Lesia SALCEDO for evaluation of cirrhosis with acute encephalopathy.    HPI:  Berta Nava is a 56 year old female with PMH including hypertension, microscopic colitis, ulcerative colitis, pancreas mucinous cystadenoma, primary sclerosing cholangitis, cirrhosis with ascites, and recent admission for liver transplant which was aborted 2/2 wide complex tachycardia. She was admitted 10/25/21 with altered mental status.    Patient follows with Dr. Leventhal at [facility] for cirrhosis 2/2 primary sclerosing cholangitis. Admitted for transplant 8/2021 but procedure was aborted due to development of intraoperative arrhythmia. She subsequently had a cardiac evaluation and is now re-listed for transplant. In the meantime, has been undergoing weekly paracentesis for ascites and is on Cipro for SBP prophylaxis. She is also undergoing ERCP Q4-6 weeks for clearance of bile ducts, most recently on 10/1.    Patient had acute onset of confusion yesterday morning with an episode of urinary incontinence. She was trying to use candy as her phone. No prior episodes like this in the past. By the end of the day yesterday the confusion had resolved and patient is alert and oriented today. CT head on admission negative for acute pathology. Ammonia normal at 33. Mild leukocytosis but afebrile and UA negative. CXR negative. LFTs/bili elevated but are consistent with her baseline. Paracentesis was performed this morning with fluid studies pending.    Patient reports having some smaller, harder stools for the few days prior to admission. She was prescribed lactulose during her transplant admission, and has it at home, but has not really needed  to take it.      ROS: A comprehensive ten point review of systems was negative aside from those in mentioned in the HPI.      MEDICATIONS: No current facility-administered medications on file prior to encounter.  ciprofloxacin (CIPRO) 500 MG tablet, Take 1 tablet (500 mg) by mouth every 24 hours  furosemide (LASIX) 20 MG tablet, Take 2 tablets (40 mg) by mouth daily  multivitamin (CENTRUM SILVER) tablet, Take 1 tablet by mouth daily  omeprazole (PRILOSEC) 20 MG DR capsule, Take 2 capsules (40 mg) by mouth 2 times daily  polyethylene glycol (MIRALAX) 17 GM/Dose powder, Take 17 g by mouth daily (Patient taking differently: Take 17 g by mouth daily as needed )  spironolactone (ALDACTONE) 50 MG tablet, Take 2 tablets (100 mg) by mouth daily  sucralfate (CARAFATE) 1 GM/10ML suspension, Take 10 mLs (1 g) by mouth 4 times daily  ursodiol (ACTIGALL) 300 MG capsule, Take 1 capsule (300 mg) by mouth 2 times daily  lactulose (CEPHULAC) 10 GM packet, Take 2 packets (20 g) by mouth daily as needed for constipation (Patient not taking: Reported on 10/7/2021)      ALLERGIES:   Allergies   Allergen Reactions     Diagnostic X-Ray Materials Hives     PATIENT HAD HIVE REACTION AFTER ADMINISTERING CT CONTRAST DYE.       Contrast Dye        Past Medical History:   Diagnosis Date     Ascites      Biliary cirrhosis (H)      Cholangitis, sclerosing      Cirrhosis of liver with ascites (H) 3/3/2021     Hearing loss of left ear     wears a hearing aide     History of low potassium      Hyperlipidemia      Hypertension      SBP (spontaneous bacterial peritonitis) (H) 4/30/2021     Sjogren's syndrome (H)      Ulcerative colitis (H)      Ulcerative pancolitis (H)        Past Surgical History:   Procedure Laterality Date     CV CORONARY ANGIOGRAM N/A 8/25/2021    Procedure: Coronary Angiogram with possible intervention;  Surgeon: David Wilhelm MD;  Location: [facility] CARDIAC CATH LAB     ENDOSCOPIC RETROGRADE CHOLANGIOPANCREATOGRAM N/A  6/25/2021    Procedure: ENDOSCOPIC RETROGRADE CHOLANGIOPANCREATOGRAPHY with ballon sweep of bile ducts for stones, balloon dilation of bile ducts and bile duct stent placement;  Surgeon: Gregory Gabriel MD;  Location: [facility] OR     ENDOSCOPIC RETROGRADE CHOLANGIOPANCREATOGRAM N/A 7/22/2021    Procedure: ENDOSCOPIC RETROGRADE CHOLANGIOPANCREATOGRAPHY STONE REMOVAL, DILATION AND STENT PLACEMENT;  Surgeon: Gregory Gabriel MD;  Location: [facility] OR     ENDOSCOPIC RETROGRADE CHOLANGIOPANCREATOGRAPHY, EXCHANGE TUBE/STENT N/A 05/05/2021    Procedure: ENDOSCOPIC RETROGRADE CHOLANGIOPANCREATOGRAPHY WITH STENT EXCHANGE, STONE EXTRACTION, AND DILATION;  Surgeon: Gregory Gabriel MD;  Location: [facility] OR     ENDOSCOPIC RETROGRADE CHOLANGIOPANCREATOGRAPHY, EXCHANGE TUBE/STENT N/A 5/10/2021    Procedure: ENDOSCOPIC RETROGRADE CHOLANGIOPANCREATOGRAPHY with biliary dilation, stone removal, stent exchange;  Surgeon: Gregory Gabriel MD;  Location: [facility] OR     ENDOSCOPIC RETROGRADE CHOLANGIOPANCREATOGRAPHY, EXCHANGE TUBE/STENT N/A 6/10/2021    Procedure: ENDOSCOPIC RETROGRADE CHOLANGIOPANCREATOGRAPHY, WITH biliary stent exchange, stone removal;  Surgeon: Woodrow Lott MD;  Location: [facility] OR     ENDOSCOPIC RETROGRADE CHOLANGIOPANCREATOGRAPHY, EXCHANGE TUBE/STENT N/A 8/30/2021    Procedure: ENDOSCOPIC RETROGRADE CHOLANGIOPANCREATOGRAPHY with biliary dilation, stone removal, stent exchange;  Surgeon: Gregory Gabriel MD;  Location: [facility] OR     ENDOSCOPIC RETROGRADE CHOLANGIOPANCREATOGRAPHY, EXCHANGE TUBE/STENT N/A 10/1/2021    Procedure: ENDOSCOPIC RETROGRADE CHOLANGIOPANCREATOGRAPHY with balloon sweep of bile ducts, bile duct stent exchanged;  Surgeon: Gregory Gabriel MD;  Location: [facility] OR     ESOPHAGOSCOPY, GASTROSCOPY, DUODENOSCOPY (EGD), COMBINED N/A 9/4/2021    Procedure: ESOPHAGOGASTRODUODENOSCOPY (EGD);  Surgeon: Leventhal, Thomas Michael, MD;  Location: [facility]     ESOPHAGOSCOPY, GASTROSCOPY, DUODENOSCOPY (EGD), COMBINED N/A 10/1/2021    Procedure:  "ESOPHAGOGASTRODUODENOSCOPY (EGD) with varices banding;  Surgeon: Gregory Gabriel MD;  Location: [facility] OR     GYN SURGERY      bilat fallopian tubes and ovaries removed     PICC DOUBLE LUMEN PLACEMENT Right 2021    42cm (2cm external), Lateral brachial vein     TRANSPLANT LIVER RECIPIENT  DONOR N/A 2021    Procedure: Opening of abdomen, Abdominal Exploration and aborted liver transplant.;  Surgeon: Ernesto Schmitt MD;  Location: [facility] OR       SOCIAL HISTORY:  Social History     Tobacco Use     Smoking status: Never Smoker     Smokeless tobacco: Never Used   Vaping Use     Vaping Use: Never used   Substance Use Topics     Alcohol use: No     Comment: Last drink was 2017     Drug use: No       FAMILY HISTORY:  Family History   Problem Relation Age of Onset     Cancer Mother         angiosarcoma     Coronary Artery Disease Early Onset Father         MI age 46     Heart Transplant Father      Liver Disease No family hx of      Ulcerative Colitis No family hx of      Crohn's Disease No family hx of        PHYSICAL EXAM:   /68 (BP Location: Left arm)   Pulse 88   Temp 98.6  F (37  C) (Oral)   Resp 20   Ht 1.651 m (5' 5\")   Wt 61 kg (134 lb 8 oz)   LMP  (LMP Unknown)   SpO2 99%   BMI 22.38 kg/m      Constitutional: No acute distress.  Head: Normocephalic, atraumatic.    Neck: Neck supple. No adenopathy.  Eyes: Scleral icterus.  ENT: Hearing adequate. Pharynx normal without erythema or exudate.  Cardiovascular: RR.  Respiratory: Nonlabored.  Abdomen: Soft, mild distention, nontender.  Neuro: CN II-XII grossly intact. No focal deficits. No asterixis.  Extremities: No edema.  Skin: Jaundice.  Psychiatric: Mentation appears normal and affect normal.      ADDITIONAL COMMENTS:   I reviewed the patient's new clinical lab test results.   Recent Labs   Lab Test 10/26/21  0651 10/25/21  1046 10/19/21  1654 10/16/21  1428   WBC 14.0* 11.6*  --  13.7*   HGB 9.2* 10.5*  --  10.0*   MCV 93 93  --  93 "    338  --  367   INR 1.26*  --  1.24* 1.27*     Recent Labs   Lab Test 10/26/21  0651 10/25/21  1046 10/19/21  1654 10/16/21  1428 10/16/21  1428   * 132* 131*   < > 127*   POTASSIUM 4.3 4.1  --   --  3.7   CHLORIDE 99 98  --   --  96   CO2 21 22  --   --  22   BUN 23 29  --   --  35*   CR 0.66 0.73 0.90   < > 0.71   ANIONGAP 10 12  --   --  9   MARIELENA 8.9 9.8  --   --  9.1   GLC 99 122*  --   --  109*    < > = values in this interval not displayed.     Recent Labs   Lab Test 10/26/21  0651 10/25/21  1339 10/25/21  1046 10/19/21  1654 10/01/21  0632 10/01/21  0632 09/16/21  1436 09/09/21  0657 09/08/21  0723 09/08/21  0723 09/07/21  0705 09/07/21  0705 09/02/21  0359 09/01/21  1757 08/20/21  0849 08/19/21  2135 08/19/21  0513 08/18/21  2132 07/22/21  0804 06/25/21  0909   ALBUMIN 2.2*  --  2.5* 3.0*   < > 2.4*   < > 3.2*   < > 3.2*   < > 3.7   < >  --    < >  --    < > 2.0*   < > 2.8*   BILITOTAL 5.1*  --  5.8* 6.1*   < > 4.0*   < > 4.6*   < > 5.1*   < > 5.9*   < >  --    < >  --    < > 3.6*   < > 3.3*   DBIL  --   --   --   --   --   --   --  3.6*  --  3.9*  --  4.3*   < >  --    < >  --   --   --   --   --    ALT 34  --  39  --   --  36   < > 27   < > 24   < > 26   < >  --    < >  --    < > 36   < > 32   AST 49*  --  59*  --   --  52*   < > 34   < > 30   < > 28   < >  --    < >  --    < > 66*   < > 58*   ALKPHOS 285*  --  308*  --   --  323*   < > 254*   < > 216*   < > 179*   < >  --    < >  --    < > 400*   < > 386*   PROTEIN  --  Negative  --   --   --   --   --   --   --   --   --   --   --  20 *  --  10 *   < >  --   --   --    LIPASE  --   --  248  --   --  517*  --   --   --   --   --   --   --   --   --   --   --   --   --  241   AMYLASE  --   --   --   --   --  90  --   --   --   --   --   --   --   --   --   --   --  68  --  66    < > = values in this interval not displayed.       IMAGING/ENDOSCOPY    CT Head w/o contrast 10/25/21   - No evidence of acute intracranial hemorrhage or mass  effect.   - Partially calcified mass overlying the left occipital calvarium measuring 1.1 x 1.8 cm, most likely representing a sebaceous cyst. There are additional subcutaneous lesions within the posterior scalp which also most likely represent sebaceous cysts. Clinical correlation is advised.      CONSULTATION ASSESSMENT AND PLAN:    Berta Nava is a 56-year-old female with PMH including cirrhosis 2/2 primary sclerosing cholangitis (currently on transplant list) and ulcerative colitis who was admitted 10/25/21 with confusion.    1) Acute encephalopathy: Patient with transient confusion yesterday but now back to baseline (normal) mental status. Infectious workup negative thus far. Paracentesis performed this morning, will follow cell count to rule out SBP. No prior history of hepatic encephalopathy - this could be the cause although her ammonia was normal. Should continue lactulose, monitor mental status, follow pending w/u.         - Lactulose titrated to 2-3 loose BMs daily. I advised patient to continue lactulose upon discharge to keep the stools soft, loose, and regular (was having small hard stools PTA).         - Follow up paracentesis/cell count to rule out SBP.    2) ESLD 2/2 PSC: Currently listed for transplant. Follows at [facility]. Gets weekly paracentesis, ERCP Q4-6 weeks. LFTs and bilirubin are at baseline.         - Trend LFTs.         - Paracentesis weekly per [facility] team.         - Continue PTA Lasix, Aldactone, Cipro, ursodiol.    I discussed the patient plan with Dr. Hernandez. Thank you for asking us to participate in the care of this patient.    Reina Gonzalez PA-C  [facility] ([facility])     ---------------  I agree with the assessment and plan of Reina SALCEDO.  Patient reports her mental status feels clear to her.  She notes that she has only had one very small hard stool today, and said she will go home tomorrow as the hospitalist would like for her to have more BMs before she goes.   On exam she is jaundiced, NAD, abd is benign.  Results reviewed showed no SBP on most recent tap.  A/P ESLD from PSC on transplant list at [facility], UC, admitted with HE, now resolved with lactulose.  Goal for 3 soft BMs per day, not diarrhea.  We will continue to follow.    Isadora Hernandez MD

## 2021-10-26 NOTE — PLAN OF CARE
End of Shift Summary  For vital signs and complete assessments, please see documentation flowsheets.     Pertinent assessments: Pt jaundiced. SBA in room. Continent. Disoriented to time. Per daughter, mentation appears to be clearing slowly. PIV x2. BM upon arrival to unit.     Major Shift Events: Arrived on unit at 1655. Chest Xray to rule out pneumonia, resulted negative. Retaining urine, PVR for >600, hospitalist paged PRN orders to straight cath. Straight cath for 200 ml.    Treatment Plan: Cipro, lactulose, lasix    Bedside Nurse: Sarai Clemens RN

## 2021-10-26 NOTE — PLAN OF CARE
Pertinent assessments: Pt A&Ox4 this shift but can be confused at times. BM x1. Abdomen rounded and distended. Continues to retain urine, attempted straight cath but had no output. MD aware. Denies pain. Intermittent nausea noted, PO zofran given. Up SBA.     Major Shift Events: None.     Treatment Plan: Cipro, lactulose, lasix

## 2021-10-26 NOTE — PROGRESS NOTES
Patient tolerated paraqcentesis well.  3800 mL of clear straw colored fluid drained done by Dr Pacheco  Dressing and glue with no drainage.  NO albumin given. Patient back to nursing unit via cart.  Report called to bedside RN.  Patient needed a 12 Turkmen drain and 60 ml syringe and 3 way stop cock to drainage bottle.

## 2021-10-26 NOTE — PROGRESS NOTES
[facility] Progress Note  [facility]    Attempted three times to see pt Berta per an admission request.  However, upon each attempt she was receiving cares.    Plan: Will follow up with pt when she is available.    Ronaldo Nieves M.Div., [facility]  Staff   Phone [phone]

## 2021-10-26 NOTE — CONSULTS
Care Management Initial Consult    General Information  Assessment completed with: Patient, Spouse or significant other,    Type of CM/SW Visit: Initial Assessment    Primary Care Provider verified and updated as needed:     Readmission within the last 30 days: no previous admission in last 30 days      Reason for Consult: care coordination/care conference, discharge planning    Communication Assessment  Patient's communication style: spoken language (English or Bilingual)    Hearing Difficulty or Deaf: yes   Wear Glasses or Blind: yes    Cognitive  Cognitive/Neuro/Behavioral: WDL  Level of Consciousness: alert (confused at times )  Arousal Level: opens eyes spontaneously  Orientation: oriented x 4  Mood/Behavior: calm, cooperative  Best Language: 0 - No aphasia  Speech: spontaneous, clear, slow    Living Environment:   People in home: spouse     Current living Arrangements: house      Able to return to prior arrangements: yes       Family/Social Support:  Care provided by: self, spouse/significant other  Provides care for:    Marital Status:             Description of Support System: Supportive         Current Resources:   Patient receiving home care services: No     Community Resources: None  Equipment currently used at home: none  Supplies currently used at home: None    Employment/Financial:  Employment Status:          Financial Concerns: No concerns identified           Lifestyle & Psychosocial Needs:  Social Determinants of Health     Tobacco Use: Low Risk      Smoking Tobacco Use: Never Smoker     Smokeless Tobacco Use: Never Used   Alcohol Use:      Frequency of Alcohol Consumption:      Average Number of Drinks:      Frequency of Binge Drinking:    Financial Resource Strain:      Difficulty of Paying Living Expenses:    Food Insecurity:      Worried About Running Out of Food in the Last Year:      Ran Out of Food in the Last Year:    Transportation Needs:      Lack of Transportation  (Medical):      Lack of Transportation (Non-Medical):    Physical Activity:      Days of Exercise per Week:      Minutes of Exercise per Session:    Stress:      Feeling of Stress :    Social Connections:      Frequency of Communication with Friends and Family:      Frequency of Social Gatherings with Friends and Family:      Attends Protestant Services:      Active Member of Clubs or Organizations:      Attends Club or Organization Meetings:      Marital Status:    Intimate Partner Violence:      Fear of Current or Ex-Partner:      Emotionally Abused:      Physically Abused:      Sexually Abused:    Depression: Not at risk     PHQ-2 Score: 0   Housing Stability:      Unable to Pay for Housing in the Last Year:      Number of Places Lived in the Last Year:      Unstable Housing in the Last Year:        Functional Status:  Prior to admission patient needed assistance:    yes    Mental Health Status:  Mental Health Status: No Current Concerns       Chemical Dependency Status:  Chemical Dependency Status: No Current Concerns             Values/Beliefs:  Spiritual, Cultural Beliefs, Protestant Practices, Values that affect care:    Description of Beliefs that Will Affect Care: Would like a             Additional Information:  Pt lives with her .  She follows with [facility] for primary.  She is connected with [facility] GI Dr Leventhal and the transplant team.  Pt does get weekly paracentesis.  Pt does manage her own medications . She hadn't been taking her lactulose since her discharge from the hospital in September as it said PRN and she had been having several bm's a day.  However, they were ball like.  Pt is now aware that she should be having 2-3 soft looose regular bm's daily.  Pt doesn't have any home care or county support.   said she can be shaky at times when walking, but doesn't use any DME. Will give hand of to clinic Rn care coordinator team for f/u with Pt.      Care Management  Note    Pt identified as a Service Bundle #3.  Unplanned readmission risk 24%.    Tasneem York RN BSN   Inpatient Care Coordination  [facility]  [phone]    Pati Yokr, RN

## 2021-10-27 VITALS
TEMPERATURE: 98.3 F | HEIGHT: 65 IN | WEIGHT: 128 LBS | BODY MASS INDEX: 21.33 KG/M2 | DIASTOLIC BLOOD PRESSURE: 55 MMHG | HEART RATE: 80 BPM | SYSTOLIC BLOOD PRESSURE: 97 MMHG | RESPIRATION RATE: 16 BRPM | OXYGEN SATURATION: 99 %

## 2021-10-27 LAB
ALBUMIN SERPL-MCNC: 2.2 G/DL (ref 3.4–5)
ALP SERPL-CCNC: 289 U/L (ref 40–150)
ALT SERPL W P-5'-P-CCNC: 37 U/L (ref 0–50)
ANION GAP SERPL CALCULATED.3IONS-SCNC: 11 MMOL/L (ref 3–14)
AST SERPL W P-5'-P-CCNC: 53 U/L (ref 0–45)
BILIRUB SERPL-MCNC: 5.6 MG/DL (ref 0.2–1.3)
BUN SERPL-MCNC: 22 MG/DL (ref 7–30)
CALCIUM SERPL-MCNC: 8.9 MG/DL (ref 8.5–10.1)
CHLORIDE BLD-SCNC: 93 MMOL/L (ref 94–109)
CO2 SERPL-SCNC: 20 MMOL/L (ref 20–32)
CREAT SERPL-MCNC: 0.76 MG/DL (ref 0.52–1.04)
ERYTHROCYTE [DISTWIDTH] IN BLOOD BY AUTOMATED COUNT: 15.1 % (ref 10–15)
GFR SERPL CREATININE-BSD FRML MDRD: 87 ML/MIN/1.73M2
GLUCOSE BLD-MCNC: 92 MG/DL (ref 70–99)
HCT VFR BLD AUTO: 28.2 % (ref 35–47)
HGB BLD-MCNC: 9.4 G/DL (ref 11.7–15.7)
MCH RBC QN AUTO: 30.4 PG (ref 26.5–33)
MCHC RBC AUTO-ENTMCNC: 33.3 G/DL (ref 31.5–36.5)
MCV RBC AUTO: 91 FL (ref 78–100)
PLATELET # BLD AUTO: 311 10E3/UL (ref 150–450)
POTASSIUM BLD-SCNC: 4.3 MMOL/L (ref 3.4–5.3)
PROT SERPL-MCNC: 6.6 G/DL (ref 6.8–8.8)
RBC # BLD AUTO: 3.09 10E6/UL (ref 3.8–5.2)
SODIUM SERPL-SCNC: 124 MMOL/L (ref 133–144)
WBC # BLD AUTO: 13.2 10E3/UL (ref 4–11)

## 2021-10-27 PROCEDURE — 36415 COLL VENOUS BLD VENIPUNCTURE: CPT | Performed by: INTERNAL MEDICINE

## 2021-10-27 PROCEDURE — 99239 HOSP IP/OBS DSCHRG MGMT >30: CPT | Performed by: INTERNAL MEDICINE

## 2021-10-27 PROCEDURE — 80053 COMPREHEN METABOLIC PANEL: CPT | Performed by: INTERNAL MEDICINE

## 2021-10-27 PROCEDURE — 250N000013 HC RX MED GY IP 250 OP 250 PS 637: Performed by: PHYSICIAN ASSISTANT

## 2021-10-27 PROCEDURE — 85027 COMPLETE CBC AUTOMATED: CPT | Performed by: INTERNAL MEDICINE

## 2021-10-27 RX ORDER — LACTULOSE 10 G/10G
20 SOLUTION ORAL 2 TIMES DAILY
Qty: 30 PACKET | Refills: 0
Start: 2021-10-27 | End: 2021-11-05

## 2021-10-27 RX ADMIN — LACTULOSE 20 G: 20 SOLUTION ORAL at 08:49

## 2021-10-27 RX ADMIN — FUROSEMIDE 40 MG: 40 TABLET ORAL at 08:50

## 2021-10-27 RX ADMIN — OMEPRAZOLE 40 MG: 20 CAPSULE, DELAYED RELEASE ORAL at 08:50

## 2021-10-27 RX ADMIN — URSODIOL 300 MG: 300 CAPSULE ORAL at 08:50

## 2021-10-27 RX ADMIN — SPIRONOLACTONE 100 MG: 100 TABLET ORAL at 08:50

## 2021-10-27 ASSESSMENT — ACTIVITIES OF DAILY LIVING (ADL)
ADLS_ACUITY_SCORE: 8

## 2021-10-27 ASSESSMENT — MIFFLIN-ST. JEOR: SCORE: 1161.48

## 2021-10-27 NOTE — DISCHARGE SUMMARY
[facility]  Discharge Summary  Hospitalist    Date of Admission:  10/25/2021  Date of Discharge:  10/27/2021 10:32 AM  Provider:  Kwabena Matson DO, [facility]    Discharge Diagnoses   1.  Acute hepatic encephalopathy  2.  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  Chronic hyponatremia 120's to 130's felt related to chronic liver disease    Other medical issues:  Past Medical History:   Diagnosis Date     Ascites      Biliary cirrhosis (H)      Cholangitis, sclerosing      Cirrhosis of liver with ascites (H) 3/3/2021     Hearing loss of left ear     wears a hearing aide     History of low potassium      Hyperlipidemia      Hypertension      SBP (spontaneous bacterial peritonitis) (H) 4/30/2021     Sjogren's syndrome (H)      Ulcerative colitis (H)      Ulcerative pancolitis (H)        History of Present Illness   Berta Nava is an 56 year old female who presented with altered mentation.  Please see the admission history and physical for full details.    Hospital Course   Berta Nava was admitted on 10/25/2021.  The following problems were addressed during her hospitalization:    Ms. Nava presented with confusion.  She has known advanced liver disease related to PSC and is on chronic weekly paracentesis and awaiting liver transplantation.  Her ammonia level was not terribly high but clinically her history/presentation/exam were consistent with some hepatic related encephalopathy.  She had not been stooling well recently.  She was give additional lactulose with improved stooling and quickly improved.  She had a paracentesis.  She was not felt to have SBP.   She was encouraged to titrate lactulose use to a goal 2-3 soft stools daily which I again reviewed with her day of discharge.  She will continue with her weekly paracentesis treatments.  She is already scheduled to see GI in November.  Her sodium has been fluctuating for several weeks 120's to 130's.  She is eating well  "and has no acute hyponatremia related issues at the moment.  She strongly desires discharge and doesn't want to stay in the hospital for sodium monitoring.  Given the lower value today, I did request a recheck with her PCP office in a couple days.  She felt much better day of discharge.  She expressed understanding for the need for a sodium recheck.  She otherwise was continued on her routine medications and expressed understanding for the need to notify a provider if any new medical issues arise.      Significant Results and Procedures   Paracentesis  See below    Pending Results     Unresulted Labs Ordered in the Past 30 Days of this Admission     Date and Time Order Name Status Description    10/25/2021  4:59 PM Ascites Fluid Aerobic Bacterial Culture Routine Preliminary     10/25/2021 12:57 PM Blood Culture Arm, Right Preliminary     10/25/2021 12:48 PM Blood Culture Peripheral Blood Preliminary           Code Status   Full Code       Primary Care Physician   Tila Nevarez    Blood pressure 97/55, pulse 80, temperature 98.3  F (36.8  C), temperature source Oral, resp. rate 16, height 1.651 m (5' 5\"), weight 58.1 kg (128 lb), SpO2 99 %, not currently breastfeeding.    Alert, oriented day of discharge.  Appeared comfortable.  Heart regular, lungs clear, abdomen non-tender.    Discharge Disposition   Discharged to home    Consultations This Hospital Stay   GASTROENTEROLOGY IP CONSULT  CARE MANAGEMENT / SOCIAL WORK IP CONSULT    Time Spent on this Encounter   IKwabena DO, personally saw the patient today and spent greater than 30 minutes discharging this patient.    Discharge Orders      Reason for your hospital stay    Hepatic encephalopathy     Follow-up and recommended labs and tests     Follow-up with GI as you already have scheduled.  Continue your routine outpatient paracentesis treatments.     Activity    Your activity upon discharge: activity as tolerated     When to contact your care team "    Call if questions.  Notify provider if fevers, worsening fatigue/confusion, falls, bleeding, worsening pain, inability to eat/worsening appetite, other new medical concerns.     Discharge Instructions    Monitor bowel movements, you should have at least 2 per day.  Titrate your lactulose dose as we discussed from once daily up to four times daily to continue having 2-3 bowel movements daily.  If you aren't having bowel movements for over a day please contact your GI clinic office to discuss with the provider.     Diet    Follow this diet upon discharge: 2 gram sodium restricted     Discharge Medications   Current Discharge Medication List      CONTINUE these medications which have CHANGED    Details   lactulose (CEPHULAC) 10 GM packet Take 2 packets (20 g) by mouth 2 times daily (Titrate up to 4 times daily if not having at least 2 bowel movements daily)  Qty: 30 packet, Refills: 0    Associated Diagnoses: Cirrhosis of liver with ascites, unspecified hepatic cirrhosis type (H)         CONTINUE these medications which have NOT CHANGED    Details   ciprofloxacin (CIPRO) 500 MG tablet Take 1 tablet (500 mg) by mouth every 24 hours  Qty: 30 tablet, Refills: 11    Associated Diagnoses: Cirrhosis of liver with ascites, unspecified hepatic cirrhosis type (H)      furosemide (LASIX) 20 MG tablet Take 2 tablets (40 mg) by mouth daily  Qty: 180 tablet, Refills: 3    Associated Diagnoses: Cirrhosis of liver with ascites, unspecified hepatic cirrhosis type (H); PSC (primary sclerosing cholangitis)      multivitamin (CENTRUM SILVER) tablet Take 1 tablet by mouth daily      omeprazole (PRILOSEC) 20 MG DR capsule Take 2 capsules (40 mg) by mouth 2 times daily  Qty: 120 capsule, Refills: 3    Associated Diagnoses: Secondary esophageal varices without bleeding (H)      polyethylene glycol (MIRALAX) 17 GM/Dose powder Take 17 g by mouth daily  Qty: 510 g, Refills: 0    Associated Diagnoses: Constipation, unspecified constipation  type      spironolactone (ALDACTONE) 50 MG tablet Take 2 tablets (100 mg) by mouth daily  Qty: 180 tablet, Refills: 3    Associated Diagnoses: Cirrhosis of liver with ascites, unspecified hepatic cirrhosis type (H); PSC (primary sclerosing cholangitis)      sucralfate (CARAFATE) 1 GM/10ML suspension Take 10 mLs (1 g) by mouth 4 times daily  Qty: 10 mL, Refills: 1    Associated Diagnoses: Secondary esophageal varices without bleeding (H)      ursodiol (ACTIGALL) 300 MG capsule Take 1 capsule (300 mg) by mouth 2 times daily  Qty: 180 capsule, Refills: 1    Associated Diagnoses: PSC (primary sclerosing cholangitis); Cirrhosis of liver with ascites, unspecified hepatic cirrhosis type (H)             Allergies   Allergies   Allergen Reactions     Diagnostic X-Ray Materials Hives     PATIENT HAD HIVE REACTION AFTER ADMINISTERING CT CONTRAST DYE.       Contrast Dye      Data   Recent Labs   Lab 10/27/21  0714 10/26/21  0651 10/25/21  1046   WBC 13.2* 14.0* 11.6*   HGB 9.4* 9.2* 10.5*   HCT 28.2* 28.2* 32.4*   MCV 91 93 93    298 338     Recent Labs   Lab 10/27/21  0714 10/26/21  0651 10/25/21  1046   * 130* 132*   POTASSIUM 4.3 4.3 4.1   CHLORIDE 93* 99 98   CO2 20 21 22   ANIONGAP 11 10 12   GLC 92 99 122*   BUN 22 23 29   CR 0.76 0.66 0.73   GFRESTIMATED 87 >90 >90   MARIELENA 8.9 8.9 9.8   MAG  --   --  2.1   PROTTOTAL 6.6* 6.8 7.9   ALBUMIN 2.2* 2.2* 2.5*   BILITOTAL 5.6* 5.1* 5.8*   ALKPHOS 289* 285* 308*   AST 53* 49* 59*   ALT 37 34 39     Results for orders placed or performed during the hospital encounter of 10/25/21   CT Head w/o Contrast    Narrative    CT HEAD W/O CONTRAST 10/25/2021 12:09 PM    INDICATION: Mental status change, unknown cause  TECHNIQUE: CT scan of the head without contrast. Dose reduction  techniques were used.  CONTRAST:  None.  COMPARISON: None.    FINDINGS:   No evidence of acute intracranial hemorrhage or mass effect. Brain  attenuation morphology are normal. The ventricles and  sulci are normal  for age. Normal gray-white matter differentiation. The basilar  cisterns are patent.    The globes are unremarkable. The partially imaged paranasal sinuses,  mastoid air cells and middle ear cavities are unremarkable. The  visualized skull base and calvarium are unremarkable. Partially  calcified mass overlying the left occipital calvarium measuring 1.1 x  1.8 cm, most likely representing a sebaceous cyst. There are  additional subcutaneous lesions within the posterior scalp which also  most likely represent sebaceous cysts. Clinical correlation is  advised.      Impression    IMPRESSION:  1.  No evidence of acute intracranial hemorrhage or mass effect.  2.  Partially calcified mass overlying the left occipital calvarium  measuring 1.1 x 1.8 cm, most likely representing a sebaceous cyst.  There are additional subcutaneous lesions within the posterior scalp  which also most likely represent sebaceous cysts. Clinical correlation  is advised.    ELMA MEDRANO MD         SYSTEM ID:  [id number]    Paracentesis    Narrative    [facility]  DATE: 10/26/2021    PROCEDURE: IMAGING GUIDED PARACENTESIS    INTERVENTIONAL RADIOLOGIST: Luan Pacheco MD.    INDICATION: Recurrent ascites.    CONSENT: The risks, benefits and alternatives of an imaging guided  paracentesis were discussed with the patient  in detail. All questions  were answered. Informed consent was given to proceed with the  procedure.    MODERATE SEDATION: None.    COMPLICATIONS: No immediate complications.    PROCEDURE:    A limited ultrasound was performed for localization purposes.    Using sterile technique 10 mL of Xylocaine was infused into the local  soft tissues. Under direct ultrasound guidance an 8F catheter was  inserted into the ascitic fluid.    A total of 3800 mL of clear yellow ascitic fluid was removed and sent  to the lab if diagnostic analysis was requested.    FINDINGS:  The initial ultrasound shows a large amount of  peritoneal ascites.    Images obtained during catheter placement show the access needle with  tip in the peritoneal ascites.      Impression    IMPRESSION:    Successful ultrasound-guided paracentesis, as discussed above.    ____________________________________________________________________    CPT codes for physician reference only:  64694      JULIAN ANDERSON MD         SYSTEM ID:  [id number]   XR Chest 2 Views    Narrative    EXAM: XR CHEST 2 VW  LOCATION: [facility]  DATE/TIME: 10/25/2021 7:09 PM    INDICATION: Rule out pneumonia as source of confusion.  COMPARISON: 10/01/2021      Impression    IMPRESSION: Negative chest.

## 2021-10-27 NOTE — PROGRESS NOTES
I scheduled Pt for f/u with primary and lab:    Oct29 LAB  Friday Oct 29, 2021 1:00 PM  Please do not eat 10-12 hours before your appointment if you are coming in fasting for labs on lipids, cholesterol, or glucose (sugar). Does not apply to pregnant women. Water, tea and black coffee (with nothing added) is okay. Do not drink other fluids, diet soda or gum. If you have concerns about taking your medications, please send a message by clicking on Secure Messaging, Message Your Care Team. [facility]  [address] [place] [address]  [phone]   Nov1 Office Visit with Tila SCHWARTZ NP  Monday Nov 1, 2021 1:00 PM  Bring a current list of meds and any records pertaining to this visit.  For Physicals, please bring immunization records and any forms needing to be filled out. Please arrive 10 minutes early to complete paperwork. [facility]  [address] [place] [address]  [phone]     Tasneem York RN BSN   Inpatient Care Coordination  [facility]  [phone]

## 2021-10-27 NOTE — PROGRESS NOTES
Feels well, had additional BM's overnight.  Will plan d/c this AM.  Already has monthly GI follow-up.  She will continue with her weekly paracentesis, due next week as had one yesterday.  Her sodium returned a little lower from yesterday, currently 124.  She is asymptomatic from this and has had many 120's and low 130 values over the past several weeks with her cirrhosis/ascites.   There is evidence that suggests that correcting sodiums in this range on cirrhotic pt's does not positively impact mortality, and pt not symptomatic from this currently.  Given this, I will d/c her as she strongly wants to go home and feels well but will request a recheck on Friday as an outpatient to make sure it isn't continuing to markedly decline.      Full d/c summary to follow.

## 2021-10-27 NOTE — PLAN OF CARE
Pt up SBA. VSS BP soft/tachy. A&Ox4, slow to respond/slightly confused in am but improved as the day went on. Adb distended. Jaundiced. 1 formed BM. Urine fabiola. Para 3800mL removed. Denies pain. Gonzalo diet, appetite fair/good, denies nausea. GI following

## 2021-10-27 NOTE — PROGRESS NOTES
A&Ox4. Up independent in room. Denies pain. Had 2 BMs overnight. Lactulose given this AM. K+4.3, Na 124. Neuro checks WDL. Discharged home today,  providing transport. Writer discussed discharge instructions with patient. She verbalized understanding to titrate lactulose at home to achieve 2 BMs per day. CC-RN scheduled lap appointment for Friday 10/29, and follow up with PCP on Monday 11/1. Patient will attend appointments. She did not have any further questions or concerns at time of discharge. MD aware of Na of 124, patient will follow up with PCP at discharge.

## 2021-10-27 NOTE — PROGRESS NOTES
SPIRITUAL HEALTH SERVICES Progress Note   Med. Surg. 5    Saw pt Berta per an admission request.  Berta shared that she has a history of an auto-immune liver disease and has undergone two unsuccessful attempts for a liver transplant.  She was admitted for confusion.  Berta reflected that trusting her in transplant team and in her [place] of care helps her cope.  She named her spouse and two children as being core to her support network.  Berta is Sikhism and kahlil has become more important to her since needing a transplant.  She welcomed prayer and engaged in a few moments of guided contemplative breathing before praying and then continued to do the contemplative breathing after shared prayer.      Plan: No further plans a pt anticipates discharging home later this morning.    Ronaldo Nieves M.Div., [facility]  Staff   Phone [phone]

## 2021-10-27 NOTE — PLAN OF CARE
[id number]    Pertinent assessments: Pt up SBA. VSS BP soft/tachy at times. A&Ox4, slow to respond and slightly confused at times. Adb distended. Jaundiced. 1 formed BM. Urine fabiola. Para 3800mL removed. Denies pain. Gonzalo diet, appetite fair/good, denies nausea. GI following.     Major Shift Events: None.     Treatment Plan: Cipro, lactulose, lasix

## 2021-10-27 NOTE — PLAN OF CARE
To Do:  End of Shift Summary  For vital signs and complete assessments, please see documentation flowsheets.     Pertinent assessments: Pt A&Ox4 this shift but can be confused at times. Denied pain, N/V. Abd soft, rounded, and distended. Jaundiced, Gonzalo diet. GI following.     Major Shift Events: Low-grade temps, went down after cool wash cloth to head.     Treatment Plan: Cipro, lactulose, lasix  Bedside Nurse: Lesly Hdz RN

## 2021-10-29 ENCOUNTER — LAB (OUTPATIENT)
Dept: LAB | Facility: CLINIC | Age: 56
End: 2021-10-29
Payer: COMMERCIAL

## 2021-10-29 DIAGNOSIS — I85.10 SECONDARY ESOPHAGEAL VARICES WITHOUT BLEEDING (H): ICD-10-CM

## 2021-10-29 DIAGNOSIS — K74.60 CIRRHOSIS OF LIVER WITH ASCITES, UNSPECIFIED HEPATIC CIRRHOSIS TYPE (H): ICD-10-CM

## 2021-10-29 DIAGNOSIS — R18.8 CIRRHOSIS OF LIVER WITH ASCITES, UNSPECIFIED HEPATIC CIRRHOSIS TYPE (H): ICD-10-CM

## 2021-10-29 DIAGNOSIS — Z76.82 AWAITING ORGAN TRANSPLANT: ICD-10-CM

## 2021-10-29 DIAGNOSIS — K74.60 CIRRHOSIS OF LIVER (H): ICD-10-CM

## 2021-10-29 LAB
ALBUMIN SERPL-MCNC: 2.3 G/DL (ref 3.4–5)
ALP SERPL-CCNC: 338 U/L (ref 40–150)
ALT SERPL W P-5'-P-CCNC: 43 U/L (ref 0–50)
ANION GAP SERPL CALCULATED.3IONS-SCNC: 12 MMOL/L (ref 3–14)
AST SERPL W P-5'-P-CCNC: 65 U/L (ref 0–45)
BILIRUB SERPL-MCNC: 7 MG/DL (ref 0.2–1.3)
BUN SERPL-MCNC: 23 MG/DL (ref 7–30)
CALCIUM SERPL-MCNC: 10 MG/DL (ref 8.5–10.1)
CHLORIDE BLD-SCNC: 90 MMOL/L (ref 94–109)
CO2 SERPL-SCNC: 19 MMOL/L (ref 20–32)
CREAT SERPL-MCNC: 0.87 MG/DL (ref 0.52–1.04)
GFR SERPL CREATININE-BSD FRML MDRD: 74 ML/MIN/1.73M2
GLUCOSE BLD-MCNC: 101 MG/DL (ref 70–99)
INR PPP: 1.3 (ref 0.85–1.15)
POTASSIUM BLD-SCNC: 4.2 MMOL/L (ref 3.4–5.3)
PROT SERPL-MCNC: 7.5 G/DL (ref 6.8–8.8)
SODIUM SERPL-SCNC: 121 MMOL/L (ref 133–144)

## 2021-10-29 PROCEDURE — 36415 COLL VENOUS BLD VENIPUNCTURE: CPT

## 2021-10-29 PROCEDURE — 85610 PROTHROMBIN TIME: CPT

## 2021-10-29 PROCEDURE — 80053 COMPREHEN METABOLIC PANEL: CPT

## 2021-10-29 RX ORDER — CIPROFLOXACIN 500 MG/1
500 TABLET, FILM COATED ORAL EVERY 24 HOURS
Qty: 90 TABLET | Refills: 3 | Status: ON HOLD | OUTPATIENT
Start: 2021-10-29 | End: 2021-11-27

## 2021-10-29 NOTE — PROGRESS NOTES
Berta Nava Thomas Jr., RN  Phone Number: [phone]     Thanks Aaron,   I seem to get every complication you can get with this horrible disease. I feel pretty good now. Glad to see my MELD score! Hopefully not to much longer if a wait. I was able to move my lab appt to 11am. They are busy, I need to go to [facility].   I do have some medication requests.   Omeprazole 20 mg 2 capsules twice a day. I need this one sent to the [facility] pharmacy. Also my ins wants a 90 day supply, not 30.   Ciprofloxacin 1 tablet every 24 hours. This prescription is already at [facility], but I need it changed to a 90 supply, not 30.   I think that s it. Let me know if you have any questions.   Thanks again   Berta

## 2021-10-30 LAB — BACTERIA BLD CULT: NO GROWTH

## 2021-10-31 LAB
BACTERIA BLD CULT: NO GROWTH
BACTERIA FLD CULT: NO GROWTH

## 2021-11-01 ENCOUNTER — OFFICE VISIT (OUTPATIENT)
Dept: INTERNAL MEDICINE | Facility: CLINIC | Age: 56
End: 2021-11-01
Payer: COMMERCIAL

## 2021-11-01 VITALS
DIASTOLIC BLOOD PRESSURE: 64 MMHG | TEMPERATURE: 97.8 F | WEIGHT: 136 LBS | SYSTOLIC BLOOD PRESSURE: 98 MMHG | RESPIRATION RATE: 16 BRPM | HEART RATE: 98 BPM | OXYGEN SATURATION: 100 % | HEIGHT: 66 IN | BODY MASS INDEX: 21.86 KG/M2

## 2021-11-01 DIAGNOSIS — K83.01 PRIMARY SCLEROSING CHOLANGITIS (H): Primary | ICD-10-CM

## 2021-11-01 PROCEDURE — 99213 OFFICE O/P EST LOW 20 MIN: CPT | Performed by: NURSE PRACTITIONER

## 2021-11-01 PROCEDURE — 36415 COLL VENOUS BLD VENIPUNCTURE: CPT | Performed by: NURSE PRACTITIONER

## 2021-11-01 PROCEDURE — 80053 COMPREHEN METABOLIC PANEL: CPT | Performed by: NURSE PRACTITIONER

## 2021-11-01 RX ORDER — ONDANSETRON 4 MG/1
4 TABLET, ORALLY DISINTEGRATING ORAL EVERY 8 HOURS PRN
Qty: 20 TABLET | Refills: 1 | Status: SHIPPED | OUTPATIENT
Start: 2021-11-01 | End: 2021-12-07

## 2021-11-01 ASSESSMENT — MIFFLIN-ST. JEOR: SCORE: 1205.7

## 2021-11-01 NOTE — PROGRESS NOTES
"  Assessment & Plan     Primary sclerosing cholangitis    - ondansetron (ZOFRAN-ODT) 4 MG ODT tab; Take 1 tablet (4 mg) by mouth every 8 hours as needed for nausea  - Comprehensive metabolic panel (BMP + Alb, Alk Phos, ALT, AST, Total. Bili, TP); Future  - Comprehensive metabolic panel (BMP + Alb, Alk Phos, ALT, AST, Total. Bili, TP)    F/u transplant team as planned.               Tila Nevarez NP  [facility] LOVE Hampton is a 56 year old who presents for the following health issues     HPI       Hospital Follow-up Visit:    Hospital/Nursing Home/IP Rehab Facility: [facility]  Date of Admission: 10/25/21   Date of Discharge: 10/27/21  Reason(s) for Admission: encephalopathy   Was your hospitalization related to COVID-19? No   Problems taking medications regularly:  None  Medication changes since discharge: None  Problems adhering to non-medication therapy:  None    Summary of hospitalization:  [facility] discharge summary reviewed  Diagnostic Tests/Treatments reviewed.  Follow up needed: labs, paracentesis, transplant team f/u   Other Healthcare Providers Involved in Patient s Care:         None  Update since discharge: stable. Post Discharge Medication Reconciliation: discharge medications reconciled, continue medications without change.  Plan of care communicated with patient              Review of Systems   Constitutional, HEENT, cardiovascular, pulmonary, gi and gu systems are negative, except as otherwise noted.      Objective    LMP  (LMP Unknown)   BP 98/64   Pulse 98   Temp 97.8  F (36.6  C) (Oral)   Resp 16   Ht 1.664 m (5' 5.5\")   Wt 61.7 kg (136 lb)   LMP  (LMP Unknown)   SpO2 100%   BMI 22.29 kg/m      Physical Exam   GENERAL: alert, frail, fatigued and appears older than stated age  EYES: conjunctiva/corneas- icteric   RESP: lungs clear to auscultation - no rales, rhonchi or wheezes  CV: regular rate and rhythm, " normal S1 S2, no S3 or S4, no murmur, click or rub, no peripheral edema and peripheral pulses strong

## 2021-11-02 ENCOUNTER — HOSPITAL ENCOUNTER (OUTPATIENT)
Dept: ULTRASOUND IMAGING | Facility: CLINIC | Age: 56
Discharge: HOME | End: 2021-11-02
Attending: INTERNAL MEDICINE | Admitting: INTERNAL MEDICINE
Payer: COMMERCIAL

## 2021-11-02 VITALS — HEART RATE: 83 BPM | SYSTOLIC BLOOD PRESSURE: 92 MMHG | OXYGEN SATURATION: 100 % | DIASTOLIC BLOOD PRESSURE: 58 MMHG

## 2021-11-02 DIAGNOSIS — Z11.59 ENCOUNTER FOR SCREENING FOR OTHER VIRAL DISEASES: ICD-10-CM

## 2021-11-02 DIAGNOSIS — R18.8 CIRRHOSIS OF LIVER WITH ASCITES, UNSPECIFIED HEPATIC CIRRHOSIS TYPE (H): ICD-10-CM

## 2021-11-02 DIAGNOSIS — K74.60 CIRRHOSIS OF LIVER WITH ASCITES, UNSPECIFIED HEPATIC CIRRHOSIS TYPE (H): ICD-10-CM

## 2021-11-02 LAB
ALBUMIN SERPL-MCNC: 2.2 G/DL (ref 3.4–5)
ALP SERPL-CCNC: 306 U/L (ref 40–150)
ALT SERPL W P-5'-P-CCNC: 37 U/L (ref 0–50)
ANION GAP SERPL CALCULATED.3IONS-SCNC: 12 MMOL/L (ref 3–14)
AST SERPL W P-5'-P-CCNC: 61 U/L (ref 0–45)
BILIRUB SERPL-MCNC: 7 MG/DL (ref 0.2–1.3)
BUN SERPL-MCNC: 26 MG/DL (ref 7–30)
CALCIUM SERPL-MCNC: 9.1 MG/DL (ref 8.5–10.1)
CHLORIDE BLD-SCNC: 92 MMOL/L (ref 94–109)
CO2 SERPL-SCNC: 18 MMOL/L (ref 20–32)
CREAT SERPL-MCNC: 0.84 MG/DL (ref 0.52–1.04)
GFR SERPL CREATININE-BSD FRML MDRD: 77 ML/MIN/1.73M2
GLUCOSE BLD-MCNC: 92 MG/DL (ref 70–99)
POTASSIUM BLD-SCNC: 4 MMOL/L (ref 3.4–5.3)
PROT SERPL-MCNC: 6.8 G/DL (ref 6.8–8.8)
SODIUM SERPL-SCNC: 122 MMOL/L (ref 133–144)

## 2021-11-02 PROCEDURE — 49083 ABD PARACENTESIS W/IMAGING: CPT

## 2021-11-02 PROCEDURE — 250N000009 HC RX 250: Performed by: RADIOLOGY

## 2021-11-02 RX ORDER — LIDOCAINE HYDROCHLORIDE 10 MG/ML
10 INJECTION, SOLUTION EPIDURAL; INFILTRATION; INTRACAUDAL; PERINEURAL ONCE
Status: COMPLETED | OUTPATIENT
Start: 2021-11-02 | End: 2021-11-02

## 2021-11-02 RX ADMIN — LIDOCAINE HYDROCHLORIDE 10 ML: 10 INJECTION, SOLUTION EPIDURAL; INFILTRATION; INTRACAUDAL; PERINEURAL at 15:34

## 2021-11-02 NOTE — PROGRESS NOTES
Patient tolerated paracentesis well.  4300 mL of straw colored fluid drained done by Dr. Barrientos.  Band aid dressing dry and intact with no drainage.  NO albumin given. Patient states understanding discharge instructions and left ambulatory in stable condition by herself.

## 2021-11-03 ENCOUNTER — TELEPHONE (OUTPATIENT)
Dept: INTERNAL MEDICINE | Facility: CLINIC | Age: 56
End: 2021-11-03

## 2021-11-04 ENCOUNTER — LAB (OUTPATIENT)
Dept: LAB | Facility: CLINIC | Age: 56
End: 2021-11-04
Payer: COMMERCIAL

## 2021-11-04 DIAGNOSIS — K74.60 CIRRHOSIS OF LIVER (H): ICD-10-CM

## 2021-11-04 DIAGNOSIS — Z11.59 ENCOUNTER FOR SCREENING FOR OTHER VIRAL DISEASES: ICD-10-CM

## 2021-11-04 LAB
ALBUMIN SERPL-MCNC: 2.1 G/DL (ref 3.4–5)
BILIRUB SERPL-MCNC: 6.4 MG/DL (ref 0.2–1.3)
CREAT SERPL-MCNC: 1.05 MG/DL (ref 0.52–1.04)
GFR SERPL CREATININE-BSD FRML MDRD: 59 ML/MIN/1.73M2
INR PPP: 1.2 (ref 0.85–1.15)
SODIUM SERPL-SCNC: 123 MMOL/L (ref 133–144)

## 2021-11-04 PROCEDURE — 82565 ASSAY OF CREATININE: CPT

## 2021-11-04 PROCEDURE — 36415 COLL VENOUS BLD VENIPUNCTURE: CPT

## 2021-11-04 PROCEDURE — U0005 INFEC AGEN DETEC AMPLI PROBE: HCPCS

## 2021-11-04 PROCEDURE — 84295 ASSAY OF SERUM SODIUM: CPT

## 2021-11-04 PROCEDURE — 82247 BILIRUBIN TOTAL: CPT

## 2021-11-04 PROCEDURE — U0003 INFECTIOUS AGENT DETECTION BY NUCLEIC ACID (DNA OR RNA); SEVERE ACUTE RESPIRATORY SYNDROME CORONAVIRUS 2 (SARS-COV-2) (CORONAVIRUS DISEASE [COVID-19]), AMPLIFIED PROBE TECHNIQUE, MAKING USE OF HIGH THROUGHPUT TECHNOLOGIES AS DESCRIBED BY CMS-2020-01-R: HCPCS

## 2021-11-04 PROCEDURE — 82040 ASSAY OF SERUM ALBUMIN: CPT

## 2021-11-04 PROCEDURE — 85610 PROTHROMBIN TIME: CPT

## 2021-11-05 ENCOUNTER — DOCUMENTATION ONLY (OUTPATIENT)
Dept: TRANSPLANT | Facility: CLINIC | Age: 56
End: 2021-11-05
Payer: COMMERCIAL

## 2021-11-05 ENCOUNTER — TELEPHONE (OUTPATIENT)
Dept: TRANSPLANT | Facility: CLINIC | Age: 56
End: 2021-11-05

## 2021-11-05 LAB — SARS-COV-2 RNA RESP QL NAA+PROBE: NEGATIVE

## 2021-11-05 RX ORDER — LACTULOSE 10 G/10G
20 SOLUTION ORAL 2 TIMES DAILY
Qty: 240 PACKET | Refills: 1 | Status: ON HOLD | OUTPATIENT
Start: 2021-11-05 | End: 2021-11-27

## 2021-11-05 NOTE — PROGRESS NOTES
This RN spoke with patient on the phone on 11/4/2021, instructed her to hold her diuretic medication per Dr. Leventhal's instruction for one week due to low sodium levels. Ms. Nava verbalized understanding of these instructions. This RN reminded patient that her ascites may fill up faster than normal due to not taking her diuretics. Ms. Nava stated that she already has a paracentesis appointment scheduled for Tuesday 11/9/2021. This RN gave patient phone number to call to speak with transplant coordinator if necessary.

## 2021-11-05 NOTE — TELEPHONE ENCOUNTER
"This RN spoke with Ms. Nava this morning. Ms Hanson called to report that she was \"starting to feel a bit more confused\". She stated that she had taken a dose of Lactulose this AM but had missed doses yesterday due to feeling nauseous. This RN encouraged Ms. Nava to continue to take Zofran to help with her nausea, which Ms. Nava did confirm has been effective in the past for nausea control. This RN educated Ms. Nava on Zofran administration - Ms. Nava was under the impression that she was only able to take Zofran once a day, which she normally takes in the morning. This RN stated that patient could take Zofran every 8 hours as ordered, for up to a maximum of 3 doses of Zofran per day.    This RN also educated/reinforced to Ms. Nava the importance of taking her Lactulose consistently. Ms. Nava reported that when she takes her Lactulose it takes a while for it to start working. Ms. Nava reported that yesterday she only \"had two small bowel movements\". This RN instructed Ms. Nava to take another dose of Lactulose this AM at around 11AM, and then another dose later this afternoon, with the goal being to have at least 2-3 large bowel movements per day.    This RN stated that he would call Ms. Nava later this after to check in on her and see how she is feeling. This RN also stated that if Ms. Nava continues to feel increasingly confused that she should go to the ER.  "

## 2021-11-06 ENCOUNTER — APPOINTMENT (OUTPATIENT)
Dept: GENERAL RADIOLOGY | Facility: CLINIC | Age: 56
DRG: 441 | End: 2021-11-06
Attending: PHYSICIAN ASSISTANT
Payer: COMMERCIAL

## 2021-11-06 ENCOUNTER — HOSPITAL ENCOUNTER (INPATIENT)
Facility: CLINIC | Age: 56
LOS: 3 days | Discharge: HOME | DRG: 441 | End: 2021-11-09
Attending: EMERGENCY MEDICINE | Admitting: INTERNAL MEDICINE
Payer: COMMERCIAL

## 2021-11-06 DIAGNOSIS — K74.60 CIRRHOSIS OF LIVER WITH ASCITES, UNSPECIFIED HEPATIC CIRRHOSIS TYPE (H): ICD-10-CM

## 2021-11-06 DIAGNOSIS — E72.20 HYPERAMMONEMIA (H): ICD-10-CM

## 2021-11-06 DIAGNOSIS — Z76.82 LIVER TRANSPLANT CANDIDATE: Primary | ICD-10-CM

## 2021-11-06 DIAGNOSIS — Z76.82 LIVER TRANSPLANT PLANNED: ICD-10-CM

## 2021-11-06 DIAGNOSIS — D72.829 LEUKOCYTOSIS, UNSPECIFIED TYPE: ICD-10-CM

## 2021-11-06 DIAGNOSIS — R18.8 CIRRHOSIS OF LIVER WITH ASCITES, UNSPECIFIED HEPATIC CIRRHOSIS TYPE (H): ICD-10-CM

## 2021-11-06 LAB
ALBUMIN SERPL-MCNC: 2.3 G/DL (ref 3.4–5)
ALBUMIN UR-MCNC: NEGATIVE MG/DL
ALP SERPL-CCNC: 314 U/L (ref 40–150)
ALT SERPL W P-5'-P-CCNC: 43 U/L (ref 0–50)
AMMONIA PLAS-SCNC: 110 UMOL/L (ref 10–50)
ANION GAP SERPL CALCULATED.3IONS-SCNC: 13 MMOL/L (ref 3–14)
APPEARANCE UR: CLEAR
AST SERPL W P-5'-P-CCNC: 69 U/L (ref 0–45)
BASOPHILS # BLD AUTO: 0.1 10E3/UL (ref 0–0.2)
BASOPHILS NFR BLD AUTO: 0 %
BILIRUB SERPL-MCNC: 6.8 MG/DL (ref 0.2–1.3)
BILIRUB UR QL STRIP: ABNORMAL
BUN SERPL-MCNC: 39 MG/DL (ref 7–30)
CALCIUM SERPL-MCNC: 10.1 MG/DL (ref 8.5–10.1)
CHLORIDE BLD-SCNC: 93 MMOL/L (ref 94–109)
CO2 SERPL-SCNC: 20 MMOL/L (ref 20–32)
COLOR UR AUTO: ABNORMAL
CREAT SERPL-MCNC: 1.26 MG/DL (ref 0.52–1.04)
EOSINOPHIL # BLD AUTO: 0.1 10E3/UL (ref 0–0.7)
EOSINOPHIL NFR BLD AUTO: 0 %
ERYTHROCYTE [DISTWIDTH] IN BLOOD BY AUTOMATED COUNT: 14.9 % (ref 10–15)
ETHANOL SERPL-MCNC: <0.01 G/DL
FLUAV RNA SPEC QL NAA+PROBE: NEGATIVE
FLUBV RNA RESP QL NAA+PROBE: NEGATIVE
GFR SERPL CREATININE-BSD FRML MDRD: 47 ML/MIN/1.73M2
GLUCOSE BLD-MCNC: 133 MG/DL (ref 70–99)
GLUCOSE UR STRIP-MCNC: NEGATIVE MG/DL
HCT VFR BLD AUTO: 30.9 % (ref 35–47)
HGB BLD-MCNC: 10.6 G/DL (ref 11.7–15.7)
HGB UR QL STRIP: NEGATIVE
HOLD SPECIMEN: NORMAL
HYALINE CASTS: 11 /LPF
IMM GRANULOCYTES # BLD: 0.2 10E3/UL
IMM GRANULOCYTES NFR BLD: 1 %
INR PPP: 1.3 (ref 0.85–1.15)
KETONES UR STRIP-MCNC: NEGATIVE MG/DL
LEUKOCYTE ESTERASE UR QL STRIP: NEGATIVE
LYMPHOCYTES # BLD AUTO: 0.6 10E3/UL (ref 0.8–5.3)
LYMPHOCYTES NFR BLD AUTO: 4 %
MCH RBC QN AUTO: 30.4 PG (ref 26.5–33)
MCHC RBC AUTO-ENTMCNC: 34.3 G/DL (ref 31.5–36.5)
MCV RBC AUTO: 89 FL (ref 78–100)
MONOCYTES # BLD AUTO: 1.2 10E3/UL (ref 0–1.3)
MONOCYTES NFR BLD AUTO: 7 %
MUCOUS THREADS #/AREA URNS LPF: PRESENT /LPF
NEUTROPHILS # BLD AUTO: 13.9 10E3/UL (ref 1.6–8.3)
NEUTROPHILS NFR BLD AUTO: 88 %
NITRATE UR QL: NEGATIVE
NRBC # BLD AUTO: 0 10E3/UL
NRBC BLD AUTO-RTO: 0 /100
PH UR STRIP: 5.5 [PH] (ref 5–7)
PLATELET # BLD AUTO: 381 10E3/UL (ref 150–450)
POTASSIUM BLD-SCNC: 4.4 MMOL/L (ref 3.4–5.3)
PROCALCITONIN SERPL-MCNC: 0.86 NG/ML
PROT SERPL-MCNC: 7.5 G/DL (ref 6.8–8.8)
RBC # BLD AUTO: 3.49 10E6/UL (ref 3.8–5.2)
RBC URINE: <1 /HPF
SARS-COV-2 RNA RESP QL NAA+PROBE: NEGATIVE
SODIUM SERPL-SCNC: 126 MMOL/L (ref 133–144)
SP GR UR STRIP: 1.02 (ref 1–1.03)
UROBILINOGEN UR STRIP-MCNC: 2 MG/DL
WBC # BLD AUTO: 16 10E3/UL (ref 4–11)
WBC URINE: 1 /HPF

## 2021-11-06 PROCEDURE — 250N000013 HC RX MED GY IP 250 OP 250 PS 637: Performed by: PHYSICIAN ASSISTANT

## 2021-11-06 PROCEDURE — 93005 ELECTROCARDIOGRAM TRACING: CPT

## 2021-11-06 PROCEDURE — 80053 COMPREHEN METABOLIC PANEL: CPT | Performed by: EMERGENCY MEDICINE

## 2021-11-06 PROCEDURE — 36415 COLL VENOUS BLD VENIPUNCTURE: CPT | Performed by: EMERGENCY MEDICINE

## 2021-11-06 PROCEDURE — 96360 HYDRATION IV INFUSION INIT: CPT

## 2021-11-06 PROCEDURE — 82140 ASSAY OF AMMONIA: CPT | Performed by: EMERGENCY MEDICINE

## 2021-11-06 PROCEDURE — 71046 X-RAY EXAM CHEST 2 VIEWS: CPT

## 2021-11-06 PROCEDURE — 87040 BLOOD CULTURE FOR BACTERIA: CPT | Performed by: EMERGENCY MEDICINE

## 2021-11-06 PROCEDURE — C9803 HOPD COVID-19 SPEC COLLECT: HCPCS

## 2021-11-06 PROCEDURE — 99285 EMERGENCY DEPT VISIT HI MDM: CPT | Mod: 25

## 2021-11-06 PROCEDURE — 250N000013 HC RX MED GY IP 250 OP 250 PS 637: Performed by: EMERGENCY MEDICINE

## 2021-11-06 PROCEDURE — 250N000013 HC RX MED GY IP 250 OP 250 PS 637: Performed by: INTERNAL MEDICINE

## 2021-11-06 PROCEDURE — 81001 URINALYSIS AUTO W/SCOPE: CPT | Performed by: EMERGENCY MEDICINE

## 2021-11-06 PROCEDURE — 258N000003 HC RX IP 258 OP 636: Performed by: PHYSICIAN ASSISTANT

## 2021-11-06 PROCEDURE — 120N000001 HC R&B MED SURG/OB

## 2021-11-06 PROCEDURE — 85025 COMPLETE CBC W/AUTO DIFF WBC: CPT | Performed by: EMERGENCY MEDICINE

## 2021-11-06 PROCEDURE — 84145 PROCALCITONIN (PCT): CPT | Performed by: EMERGENCY MEDICINE

## 2021-11-06 PROCEDURE — 96361 HYDRATE IV INFUSION ADD-ON: CPT

## 2021-11-06 PROCEDURE — 99223 1ST HOSP IP/OBS HIGH 75: CPT | Mod: AI | Performed by: PHYSICIAN ASSISTANT

## 2021-11-06 PROCEDURE — 87636 SARSCOV2 & INF A&B AMP PRB: CPT | Performed by: EMERGENCY MEDICINE

## 2021-11-06 PROCEDURE — 85610 PROTHROMBIN TIME: CPT | Performed by: EMERGENCY MEDICINE

## 2021-11-06 PROCEDURE — 82077 ASSAY SPEC XCP UR&BREATH IA: CPT | Performed by: EMERGENCY MEDICINE

## 2021-11-06 RX ORDER — ONDANSETRON 2 MG/ML
4 INJECTION INTRAMUSCULAR; INTRAVENOUS EVERY 6 HOURS PRN
Status: DISCONTINUED | OUTPATIENT
Start: 2021-11-06 | End: 2021-11-09 | Stop reason: HOSPADM

## 2021-11-06 RX ORDER — ONDANSETRON 4 MG/1
4 TABLET, ORALLY DISINTEGRATING ORAL EVERY 6 HOURS PRN
Status: DISCONTINUED | OUTPATIENT
Start: 2021-11-06 | End: 2021-11-09 | Stop reason: HOSPADM

## 2021-11-06 RX ORDER — LACTULOSE 10 G/15ML
20 SOLUTION ORAL EVERY 6 HOURS
Status: DISCONTINUED | OUTPATIENT
Start: 2021-11-06 | End: 2021-11-09 | Stop reason: HOSPADM

## 2021-11-06 RX ORDER — SODIUM CHLORIDE, SODIUM LACTATE, POTASSIUM CHLORIDE, CALCIUM CHLORIDE 600; 310; 30; 20 MG/100ML; MG/100ML; MG/100ML; MG/100ML
INJECTION, SOLUTION INTRAVENOUS CONTINUOUS
Status: DISCONTINUED | OUTPATIENT
Start: 2021-11-06 | End: 2021-11-09 | Stop reason: HOSPADM

## 2021-11-06 RX ORDER — POLYETHYLENE GLYCOL 3350 17 G/17G
17 POWDER, FOR SOLUTION ORAL DAILY PRN
COMMUNITY
End: 2021-11-15

## 2021-11-06 RX ORDER — LIDOCAINE 40 MG/G
CREAM TOPICAL
Status: DISCONTINUED | OUTPATIENT
Start: 2021-11-06 | End: 2021-11-09 | Stop reason: HOSPADM

## 2021-11-06 RX ORDER — PROCHLORPERAZINE 25 MG
25 SUPPOSITORY, RECTAL RECTAL EVERY 12 HOURS PRN
Status: DISCONTINUED | OUTPATIENT
Start: 2021-11-06 | End: 2021-11-09 | Stop reason: HOSPADM

## 2021-11-06 RX ORDER — PROCHLORPERAZINE MALEATE 5 MG
10 TABLET ORAL EVERY 6 HOURS PRN
Status: DISCONTINUED | OUTPATIENT
Start: 2021-11-06 | End: 2021-11-09 | Stop reason: HOSPADM

## 2021-11-06 RX ORDER — CIPROFLOXACIN 250 MG/1
500 TABLET, FILM COATED ORAL EVERY 24 HOURS
Status: DISCONTINUED | OUTPATIENT
Start: 2021-11-06 | End: 2021-11-09 | Stop reason: HOSPADM

## 2021-11-06 RX ORDER — URSODIOL 300 MG/1
300 CAPSULE ORAL 2 TIMES DAILY
Status: DISCONTINUED | OUTPATIENT
Start: 2021-11-06 | End: 2021-11-09 | Stop reason: HOSPADM

## 2021-11-06 RX ORDER — LACTULOSE 10 G/15ML
20 SOLUTION ORAL ONCE
Status: COMPLETED | OUTPATIENT
Start: 2021-11-06 | End: 2021-11-06

## 2021-11-06 RX ADMIN — URSODIOL 300 MG: 300 CAPSULE ORAL at 20:27

## 2021-11-06 RX ADMIN — LACTULOSE 20 G: 10 SOLUTION ORAL at 20:27

## 2021-11-06 RX ADMIN — OMEPRAZOLE 40 MG: 20 CAPSULE, DELAYED RELEASE ORAL at 20:27

## 2021-11-06 RX ADMIN — RIFAXIMIN 550 MG: 550 TABLET ORAL at 21:54

## 2021-11-06 RX ADMIN — OMEPRAZOLE 40 MG: 20 CAPSULE, DELAYED RELEASE ORAL at 10:46

## 2021-11-06 RX ADMIN — LACTULOSE 20 G: 10 SOLUTION ORAL at 15:31

## 2021-11-06 RX ADMIN — LACTULOSE 20 G: 20 SOLUTION ORAL at 04:50

## 2021-11-06 RX ADMIN — SODIUM CHLORIDE, POTASSIUM CHLORIDE, SODIUM LACTATE AND CALCIUM CHLORIDE: 600; 310; 30; 20 INJECTION, SOLUTION INTRAVENOUS at 10:51

## 2021-11-06 RX ADMIN — CIPROFLOXACIN 500 MG: 250 TABLET ORAL at 10:47

## 2021-11-06 ASSESSMENT — ACTIVITIES OF DAILY LIVING (ADL)
WERE_AUXILIARY_AIDS_OFFERED?: NO
ADLS_ACUITY_SCORE: 8
ADLS_ACUITY_SCORE: 12
ADLS_ACUITY_SCORE: 14
ADLS_ACUITY_SCORE: 8
THE_FOLLOWING_AIDS_WERE_PROVIDED;: PATIENT DECLINED OFFER OF AUXILIARY AIDS
FALL_HISTORY_WITHIN_LAST_SIX_MONTHS: NO
WEAR_GLASSES_OR_BLIND: NO
DOING_ERRANDS_INDEPENDENTLY_DIFFICULTY: NO
ADLS_ACUITY_SCORE: 12
COMMUNICATION_MANAGEMENT: MEDICATION MANAGMENT
CONCENTRATING,_REMEMBERING_OR_MAKING_DECISIONS_DIFFICULTY: YES
USE_OF_HEARING_ASSISTIVE_DEVICES: BILATERAL HEARING AIDS
PATIENT'S_PREFERRED_MEANS_OF_COMMUNICATION: ENGLISH SPEAKER WITH HEARING LOSS, NO SPEECH PROBLEMS.
ADLS_ACUITY_SCORE: 8
ADLS_ACUITY_SCORE: 12
DIFFICULTY_COMMUNICATING: YES
ADLS_ACUITY_SCORE: 8
DESCRIBE_HEARING_LOSS: BILATERAL HEARING LOSS
WHICH_OF_THE_ABOVE_FUNCTIONAL_RISKS_HAD_A_RECENT_ONSET_OR_CHANGE?: AMBULATION;COGNITION
ADLS_ACUITY_SCORE: 12
ADLS_ACUITY_SCORE: 8
COMMUNICATION: DIFFICULTY UNDERSTANDING
PATIENT_/_FAMILY_COMMUNICATION_STYLE: SPOKEN LANGUAGE (ENGLISH OR BILINGUAL)
TOILETING_ISSUES: NO
ADLS_ACUITY_SCORE: 12
WALKING_OR_CLIMBING_STAIRS_DIFFICULTY: NO
DRESSING/BATHING_DIFFICULTY: NO
HEARING_DIFFICULTY_OR_DEAF: YES
DIFFICULTY_EATING/SWALLOWING: NO
ADLS_ACUITY_SCORE: 14

## 2021-11-06 NOTE — ED NOTES
[facility]  ED Nurse Handoff Report    Berta Nava is a 56 year old female   ED Chief complaint: Altered Mental Status  . ED Diagnosis:   Final diagnoses:   None     Allergies:   Allergies   Allergen Reactions     Diagnostic X-Ray Materials Hives     PATIENT HAD HIVE REACTION AFTER ADMINISTERING CT CONTRAST DYE.       Contrast Dye        Code Status: Full Code  Activity level - Baseline/Home:  Independent. Activity Level - Current:   Assist X 1. Lift room needed: No. Bariatric: No   Needed: No   Isolation: No. Infection: Not Applicable.     Vital Signs:   Vitals:    11/06/21 0401 11/06/21 0415 11/06/21 0430   BP: 121/76     Pulse: 82     Resp: 14     Temp: 97.7  F (36.5  C)     TempSrc: Oral     SpO2: 100% 100% 100%       Cardiac Rhythm:  ,      Pain level:    Patient confused: Yes. Patient Falls Risk: Yes.   Elimination Status: Straight cath'd x1  Patient Report - Initial Complaint: Altered Mental Status. Focused Assessment: Pt oriented to place and self, forgetful, restless but redirectable.    Tests Performed: labs, ekg. Abnormal Results:   Labs Ordered and Resulted from Time of ED Arrival to Time of ED Departure   COMPREHENSIVE METABOLIC PANEL - Abnormal       Result Value    Sodium 126 (*)     Potassium 4.4      Chloride 93 (*)     Carbon Dioxide (CO2) 20      Anion Gap 13      Urea Nitrogen 39 (*)     Creatinine 1.26 (*)     Calcium 10.1      Glucose 133 (*)     Alkaline Phosphatase 314 (*)     AST 69 (*)     ALT 43      Protein Total 7.5      Albumin 2.3 (*)     Bilirubin Total 6.8 (*)     GFR Estimate 47 (*)    AMMONIA - Abnormal    Ammonia 110 (*)    CBC WITH PLATELETS AND DIFFERENTIAL - Abnormal    WBC Count 16.0 (*)     RBC Count 3.49 (*)     Hemoglobin 10.6 (*)     Hematocrit 30.9 (*)     MCV 89      MCH 30.4      MCHC 34.3      RDW 14.9      Platelet Count 381      % Neutrophils 88      % Lymphocytes 4      % Monocytes 7      % Eosinophils 0      % Basophils 0      %  Immature Granulocytes 1      NRBCs per 100 WBC 0      Absolute Neutrophils 13.9 (*)     Absolute Lymphocytes 0.6 (*)     Absolute Monocytes 1.2      Absolute Eosinophils 0.1      Absolute Basophils 0.1      Absolute Immature Granulocytes 0.2 (*)     Absolute NRBCs 0.0     INR - Abnormal    INR 1.30 (*)    ETHYL ALCOHOL LEVEL - Normal    Alcohol ethyl <0.01     URINE MACROSCOPIC WITH REFLEX TO MICRO   INFLUENZA A/B & SARS-COV2 PCR MULTIPLEX     No orders to display     .   Treatments provided: See MAR, straight cath  Family Comments:  was at bedside.   OBS brochure/video discussed/provided to patient:  Yes  ED Medications:   Medications   lactulose (CHRONULAC) solution 20 g (20 g Oral Given 11/6/21 0450)     Drips infusing:  No  For the majority of the shift, the patient's behavior Green. Interventions performed were easily redirectable.    Sepsis treatment initiated: No     Patient tested for COVID 19 prior to admission: YES    ED Nurse Name/Phone Number: Maria Luisa Estevez RN,   5:34 AM

## 2021-11-06 NOTE — PROGRESS NOTES
No charge note    Checking on patient who is boarding in the emergency room waiting for a bed admitted earlier this morning H&P reviewed I agree with assessment and plan of Dr. Gates and his PA  Patient seems to be more alert when I saw her she still oriented to place and self only does not know the date, can easily get distracted and confused during the conversation  Continue lactulose, and rifaximin  Follow-up with gastroenterology consulted

## 2021-11-06 NOTE — ED PROVIDER NOTES
History     Chief Complaint:  Altered Mental Status      History limited by: Altered Mental Status.      Berta Nava is a 56 year old female history of sclerosing cholangitis, hypertension, hyperlipidemia, chronic hyponatremia who presents with altered mental status. Tonight the patient appeared to having increased confusion and was bumping into objects at home.  No reported head trauma. There is also reports of agitation that was directed at her .  There was no fall or injury. The patient is currently on a liver transplant list. No fevers, chills, cough, dyspnea, chest pain, abdominal pain, dysuria, diarrhea.  Patient is supposed to be on lactulose.        Review of Systems   Unable to perform ROS: Mental status change       Allergies:  Diagnostic X-Ray Materials  Contrast Dye    Medications:    Cipro   Lasix   Omeprazole   Zofran   Aldactone   Actigall     Past Medical History:    Ascites   Biliary cirrhosis (H)   Cholangitis, sclerosing   Cirrhosis of liver with ascites (H)   Hearing loss of left ear   History of low potassium   Hyperlipidemia   Hypertension   SBP (spontaneous bacterial peritonitis) (H)   Sjogren's syndrome (H)   Ulcerative pancolitis (H)  Encephalopathy m  Severe protein-calorie malnutrition (H)   Acute kidney failure, unspecified (H)   Liver transplant candidate   Primary sclerosing cholangitis   Secondary esophageal varices without bleeding (H)   SBP (spontaneous bacterial peritonitis) (H)   Choledocholithiasis   Sepsis (H)   Cirrhosis of liver with ascites (H)   PSC (primary sclerosing cholangitis)   Ulcerative pancolitis with complication (H)   Cholangitis   Hypokalemia   Chronic fatigue   Sicca syndrome (H)   Neoplasm of uncertain behavior of skin   Essential hypertension     Past Surgical History:    Salpingo-oophorectomy   Liver transplant     Family History:    Mother - angiosarcoma   Father - MI, heart transplant, HTN     Social History:  Patient was unaccompanied to the  ED.  Hx of alcohol use     Physical Exam     Patient Vitals for the past 24 hrs:   BP Temp Temp src Pulse Resp SpO2   21 0430 -- -- -- -- -- 100 %   215 -- -- -- -- -- 100 %   21 0401 121/76 97.7  F (36.5  C) Oral 82 14 100 %       Physical Exam  Nursing note and vitals reviewed.  Constitutional: Thin appearing  Head: asymptomatic  Eyes: Scleral icterus. Pupils are equal, round, and reactive to light.   ENT: Nose normal. Mucous membranes pink and moist.    Neck: Normal range of motion.  CVS: Normal rate, regular rhythm.  Normal heart sounds.    Pulmonary: Lungs clear to auscultation bilaterally. No wheezes/rales/rhonchi.  GI: Abdomen soft. Nontender, nondistended. No rigidity or guarding.    MSK: No calf tenderness or swelling.  No c/t/l bony tenderness  Neuro: Alert to person only. Follows simple commands.  Skin: Skin is warm and dry. No rash noted.   Psychiatric: Flat affect      Emergency Department Course   EC  ECG taken at 0458, ECG read at 0458  Normal sinus rhythm   Normal ECG   Rate 79 bpm. NM interval 146 ms. QRS duration 96 ms. QT/QTc 414/474 ms. P-R-T axes 25 35 42.     Laboratory:  Labs Ordered and Resulted from Time of ED Arrival to Time of ED Departure   COMPREHENSIVE METABOLIC PANEL - Abnormal       Result Value    Sodium 126 (*)     Potassium 4.4      Chloride 93 (*)     Carbon Dioxide (CO2) 20      Anion Gap 13      Urea Nitrogen 39 (*)     Creatinine 1.26 (*)     Calcium 10.1      Glucose 133 (*)     Alkaline Phosphatase 314 (*)     AST 69 (*)     ALT 43      Protein Total 7.5      Albumin 2.3 (*)     Bilirubin Total 6.8 (*)     GFR Estimate 47 (*)    AMMONIA - Abnormal    Ammonia 110 (*)    CBC WITH PLATELETS AND DIFFERENTIAL - Abnormal    WBC Count 16.0 (*)     RBC Count 3.49 (*)     Hemoglobin 10.6 (*)     Hematocrit 30.9 (*)     MCV 89      MCH 30.4      MCHC 34.3      RDW 14.9      Platelet Count 381      % Neutrophils 88      % Lymphocytes 4      % Monocytes 7      %  Eosinophils 0      % Basophils 0      % Immature Granulocytes 1      NRBCs per 100 WBC 0      Absolute Neutrophils 13.9 (*)     Absolute Lymphocytes 0.6 (*)     Absolute Monocytes 1.2      Absolute Eosinophils 0.1      Absolute Basophils 0.1      Absolute Immature Granulocytes 0.2 (*)     Absolute NRBCs 0.0     INR - Abnormal    INR 1.30 (*)    URINE MACROSCOPIC WITH REFLEX TO MICRO - Abnormal    Color Urine Dark Yellow (*)     Appearance Urine Clear      Glucose Urine Negative      Bilirubin Urine Moderate (*)     Ketones Urine Negative      Specific Gravity Urine 1.018      Blood Urine Negative      pH Urine 5.5      Protein Albumin Urine Negative      Urobilinogen Urine 2.0      Nitrite Urine Negative      Leukocyte Esterase Urine Negative      RBC Urine <1      WBC Urine 1      Mucus Urine Present (*)     Hyaline Casts Urine 11 (*)    INFLUENZA A/B & SARS-COV2 PCR MULTIPLEX - Normal    Influenza A target Negative      Influenza B target Negative      SARS CoV2 PCR Negative     ETHYL ALCOHOL LEVEL - Normal    Alcohol ethyl <0.01     PROCALCITONIN   BLOOD CULTURE         Emergency Department Course:    Reviewed:  I reviewed nursing notes, vitals, past history and care everywhere    Assessments:  0451 I obtained history and examined the patient as noted above.    I rechecked the patient and explained findings.         Interventions:  0450 Chronulac 20g PO     Disposition:  The patient was admitted to the hospital under the care of Dr. Gates; spoke to DENISSE Vieira    Impression & Plan      Medical Decision Making:  Patient is a 56-year-old female presenting with reported altered mental status.  She has no signs of trauma on exam.  She is nontoxic, in no significant distress.  No meningeal signs.  Labs with noted leukocytosis, I suspect this is more reactive.  There has been no reported fevers and patient always appears to have a mild leukocytosis.  UA without infection.  Lungs clear, clinically doubt  pneumonia.  No abdominal tenderness, I doubt intra-abdominal catastrophe including but not limited to SBP. I do not feel emergent paracentesis is needed at this time and hospitalist team comfortable deferring.  Blood cultures/procalcitonin sent. Patient's ammonia quite elevated today, I do suspect this is likely contributing to her encephalopathy.  She was given lactulose during her time in the ED.  She remained hemodynamically stable, accepted by hospitalist for admission.       Covid-19  Berta Nava was evaluated during a global COVID-19 pandemic, which necessitated consideration that the patient might be at risk for infection with the SARS-CoV-2 virus that causes COVID-19.   Applicable protocols for evaluation were followed during the patient's care.   COVID-19 was considered as part of the patient's evaluation. The plan for testing is:  a test was obtained during this visit.      Diagnosis:    ICD-10-CM    1. Hyperammonemia (H)  E72.20    2. Leukocytosis, unspecified type  D72.829          Scribe Disclosure:  I, Kendrick Stephen, am serving as a scribe at 4:51 AM on 11/6/2021 to document services personally performed by Lolly Novak DO based on my observations and the provider's statements to me.      Lolly Novak DO  11/06/21 0645

## 2021-11-06 NOTE — PROGRESS NOTES
Patient alert and disoriented intermittently to place, time, situation  Easily reoriented  Up assist x1 with gait belt- weak and slow moving  No other neuro deficits noticed  Peripheral IV infusing IVF  VSS, on room air  Denies pain  Tolerating diet, appetite fair, denies nausea  Transferring to ortho unit for further care  Report called to accepting RN, spouse updated

## 2021-11-06 NOTE — PHARMACY-ADMISSION MEDICATION HISTORY
Admission medication history interview status for this patient is complete. See [facility] admission navigator for allergy information, prior to admission medications and immunization status.     Medication history interview done, indicate source(s): Family (, Demar) and note from 11/5/21 James Whitehead RN  Medication history resources (including written lists, pill bottles, clinic record): SureScripts and Care Everywhere  Pharmacy: [facility]. [facility] for discharge    Changes made to PTA medication list:  Added: -  Changed: -  Reported as Not Taking: -  Removed: -    Actions taken by pharmacist (provider contacted, etc): Pt AMS, called spouse to verify home med list. Of note, patient was at [facility] 10/25-27th     Additional medication history information: Pt has been more forgetful at home and has missed several doses of medications this week. 11/5- instructed to increase lactulose to 20g BID and titrate up to QID if no BM.     Medication reconciliation/reorder completed by provider prior to medication history?  Y   (Y/N)       Prior to Admission medications    Medication Sig Last Dose Taking? Auth Provider   ciprofloxacin (CIPRO) 500 MG tablet Take 1 tablet (500 mg) by mouth every 24 hours Past Week at Unknown time Yes Leventhal, Thomas Michael, MD   furosemide (LASIX) 20 MG tablet Take 2 tablets (40 mg) by mouth daily Past Week at Unknown time Yes Leventhal, Thomas Michael, MD   lactulose (CEPHULAC) 10 GM packet Take 2 packets (20 g) by mouth 2 times daily (Titrate up to 4 times daily if not having at least 2 bowel movements daily) 11/4/2021 at AM Yes Leventhal, Thomas Michael, MD   multivitamin (CENTRUM SILVER) tablet Take 1 tablet by mouth daily Past Week at Unknown time Yes Unknown, Entered By History   omeprazole (PRILOSEC) 20 MG DR capsule Take 2 capsules (40 mg) by mouth 2 times daily Past Week at Unknown time Yes Leventhal, Thomas Michael, MD   polyethylene glycol (MIRALAX)  17 g packet Take 17 g by mouth daily as needed for constipation Past Month at Unknown time Yes Unknown, Entered By History   spironolactone (ALDACTONE) 50 MG tablet Take 2 tablets (100 mg) by mouth daily Past Week at Unknown time Yes Leventhal, Thomas Michael, MD   ursodiol (ACTIGALL) 300 MG capsule Take 1 capsule (300 mg) by mouth 2 times daily Past Week at Unknown time Yes Leventhal, Thomas Michael, MD   ondansetron (ZOFRAN-ODT) 4 MG ODT tab Take 1 tablet (4 mg) by mouth every 8 hours as needed for nausea   Tila Nevarez, NP

## 2021-11-06 NOTE — CONSULTS
CLINICAL NUTRITION SERVICES - BRIEF NOTE    Consult received for Provider Order - malnutrition     Chart reviewed. Pt is well known to RD team at [facility] from prior admission in 8/2021-9/2021. Pt was receiving Ensure supplements during this time and was eventually started on TF continuous regimen due to poor PO intakes.     Will defer full nutrition assessment at this time while pt boarding in ED and waiting for bed. Plan to send Ensure BID for now and will complete full assessment and visit with pt once transferred to the floor.     Lizzie Sorto RD, LD  Weekend RD Pager: [phone]

## 2021-11-06 NOTE — H&P
History and Physical     Berta Nava MRN# [id number]   YOB: 1965 Age: 56 year old      Date of Admission:  11/6/2021    Primary care provider: Tila Nevarez          Assessment and Plan:   Berta Nava is a medically complex 56 year old female with a PMH significant for primary sclerosing cholangitis, cirrhosis complicated by ascites requiring paracentesis, pancreatic mucinous cystoadenoma, ulcerative colitis, microscopic colitis and hypertension who presents with altered mental status.    Patient was discussed with Dr. Novak, who was provider in ED. Chart review of ED work up was reviewed as well as chart review of Care Everywhere, previous visits and admissions.     #Hyperammoniaemia  #Hepatic encephalopathy  Presents with increased confusion and somnolence.  Reportedly has been bumping into things and more agitated at home but unable to confirm this with family members.  In the ED she is vitally stable without fever.  She is alert follows commands but is not oriented and is nonsensical.  Lab work shows a sodium of 126 which is baseline, creatinine 1.26 with BUN 39.  LFTs are elevated but stable with bilirubin of 6.8, , ALT 43 and AST 69.  Glucose 133 and ammonia level is 110.  WBC is 16 but based on review WBC is always appear elevated, hemoglobin 10.6 and platelet count 381.  Ethanol level is undetectable, UA does not appear infected, blood culture was drawn and Covid is negative.  Based on telephone records it appears that she has been vomiting more frequently and not taking her lactulose due to this.  Suspect that her symptoms are related to elevated ammonia levels and not highly suspicious for underlying infection at this time.  -Titrate lactulose to have at least 4 bowel movements daily  -Repeat ammonia in a.m.  -Monitor for signs of SBP  -GI consult    #GONZALO suspect due to recent vomiting  Nursing notes indicate recent nausea and vomiting.  Per report she has been  unable to take her lactulose due to this.  Creatinine at baseline is 0.7-0.9 with current creatinine of 1.26 and she appears dehydrated.  -Hold spironolactone and furosemide for now  -Gentle IV fluids with LR at 75 mils per hour  -Monitor for signs of fluid overload    #End-stage liver disease secondary to primary sclerosing cholangitis  She has end-stage liver disease and has been awaiting liver transplant.  Was about to undergo liver transplant in August when she had episode of sustained V. tach requiring cardioversion in the OR so this was aborted.  Patient/family report negative cardiac work-up since.  Also, very recently was turned down for a potential liver as it was too large for her body size.  She follows with [facility] hepatology/transplant clinic.  She gets a weekly paracentesis on Tuesday usually large volume. She takes furosemide and spironolactone at baseline.  She is also on ciprofloxacin SBP prophylaxis and ursodiol.   -Holding furosemide and spironolactone due to dehydration  -Continue ursodiol  -Resume ciprofloxacin for SBP prophylaxis  -Nutrition consult    #Chronic leukocytosis  Patient has chronically elevated white blood cell count in the 10-14 range, primarily neutrophils.  This has been present for months per lab review.  As above doubt any new infection at this time.     #Anemia of chronic disease  Hemoglobin 9-10 range which I suspect is secondary to her end-stage liver disease.  No sign of bleeding recently.     #Chronic mild hyponatremia  Sodium at baseline level which is secondary to her end-stage liver disease.  She is on diuretics that have been resumed and sodium appears stable.  -BMP tomorrow     #History ulcerative colitis and microscopic colitis  Not on any specific therapy for     Social: No concerns  Code: Discussed with patient and they have chosen Full code  VTE prophylaxis: PCDs  Disposition: Inpatient                     Chief Complaint:   Altered mental status         History of  Present Illness:   History is limited due to patient's somnolence.   reportedly left ED and we have not been able to get a hold of him.    Berta Nava is a 56 year old female who presents with altered mental status.  Per the ED provider she presented to the hospital with her  with increased confusion and was bumping in to objects at home while ambulating.  There are also reports of increased agitation towards her  but he is not available to confirm.  She denies any active pain, shortness of breath, cough, nausea, vomiting and diarrhea.  She can tell me she is at [place] but otherwise is not oriented and does not answer questions appropriately but does follow commands.  Based on telephone encounters in the chart she has been having nausea recently and unable to take some of her lactulose doses which may explain her symptoms.             Past Medical History:     Past Medical History:   Diagnosis Date     Ascites      Biliary cirrhosis (H)      Cholangitis, sclerosing      Cirrhosis of liver with ascites (H) 3/3/2021     Hearing loss of left ear     wears a hearing aide     History of low potassium      Hyperlipidemia      Hypertension      SBP (spontaneous bacterial peritonitis) (H) 4/30/2021     Sjogren's syndrome (H)      Ulcerative colitis (H)      Ulcerative pancolitis (H)                Past Surgical History:     Past Surgical History:   Procedure Laterality Date     CV CORONARY ANGIOGRAM N/A 8/25/2021    Procedure: Coronary Angiogram with possible intervention;  Surgeon: David Wilhelm MD;  Location:  [facility]ATH [facility]     ENDOSCOPIC RETROGRADE CHOLANGIOPANCREATOGRAM N/A 6/25/2021    Procedure: ENDOSCOPIC RETROGRADE CHOLANGIOPANCREATOGRAPHY with ballon sweep of bile ducts for stones, balloon dilation of bile ducts and bile duct stent placement;  Surgeon: Gregory Gabriel MD;  Location:  OR     ENDOSCOPIC RETROGRADE CHOLANGIOPANCREATOGRAM N/A 7/22/2021    Procedure:  ENDOSCOPIC RETROGRADE CHOLANGIOPANCREATOGRAPHY STONE REMOVAL, DILATION AND STENT PLACEMENT;  Surgeon: Gregory Gabriel MD;  Location:  OR     ENDOSCOPIC RETROGRADE CHOLANGIOPANCREATOGRAPHY, EXCHANGE TUBE/STENT N/A 2021    Procedure: ENDOSCOPIC RETROGRADE CHOLANGIOPANCREATOGRAPHY WITH STENT EXCHANGE, STONE EXTRACTION, AND DILATION;  Surgeon: Gregory Gabriel MD;  Location: [facility] OR     ENDOSCOPIC RETROGRADE CHOLANGIOPANCREATOGRAPHY, EXCHANGE TUBE/STENT N/A 5/10/2021    Procedure: ENDOSCOPIC RETROGRADE CHOLANGIOPANCREATOGRAPHY with biliary dilation, stone removal, stent exchange;  Surgeon: Gregory Gabriel MD;  Location: [facility] OR     ENDOSCOPIC RETROGRADE CHOLANGIOPANCREATOGRAPHY, EXCHANGE TUBE/STENT N/A 6/10/2021    Procedure: ENDOSCOPIC RETROGRADE CHOLANGIOPANCREATOGRAPHY, WITH biliary stent exchange, stone removal;  Surgeon: Woodrow Lott MD;  Location: [facility] OR     ENDOSCOPIC RETROGRADE CHOLANGIOPANCREATOGRAPHY, EXCHANGE TUBE/STENT N/A 2021    Procedure: ENDOSCOPIC RETROGRADE CHOLANGIOPANCREATOGRAPHY with biliary dilation, stone removal, stent exchange;  Surgeon: Gregory Gabriel MD;  Location: [facility] OR     ENDOSCOPIC RETROGRADE CHOLANGIOPANCREATOGRAPHY, EXCHANGE TUBE/STENT N/A 10/1/2021    Procedure: ENDOSCOPIC RETROGRADE CHOLANGIOPANCREATOGRAPHY with balloon sweep of bile ducts, bile duct stent exchanged;  Surgeon: Gregory Gabriel MD;  Location: [facility] OR     ESOPHAGOSCOPY, GASTROSCOPY, DUODENOSCOPY (EGD), COMBINED N/A 2021    Procedure: ESOPHAGOGASTRODUODENOSCOPY (EGD);  Surgeon: Leventhal, Thomas Michael, MD;  Location: [facility] GI     ESOPHAGOSCOPY, GASTROSCOPY, DUODENOSCOPY (EGD), COMBINED N/A 10/1/2021    Procedure: ESOPHAGOGASTRODUODENOSCOPY (EGD) with varices banding;  Surgeon: Gregory Gabriel MD;  Location: [facility] OR     GYN SURGERY      bilat fallopian tubes and ovaries removed     PICC DOUBLE LUMEN PLACEMENT Right 2021    42cm (2cm external), Lateral brachial vein     TRANSPLANT LIVER RECIPIENT   DONOR N/A 8/19/2021    Procedure: Opening of abdomen, Abdominal Exploration and aborted liver transplant.;  Surgeon: Ernesto Schmitt MD;  Location:  OR               Social History:     Social History     Socioeconomic History     Marital status:      Spouse name: Not on file     Number of children: Not on file     Years of education: Not on file     Highest education level: Not on file   Occupational History     Not on file   Tobacco Use     Smoking status: Never Smoker     Smokeless tobacco: Never Used   Vaping Use     Vaping Use: Never used   Substance and Sexual Activity     Alcohol use: No     Comment: Last drink was 2017     Drug use: No     Sexual activity: Not Currently     Partners: Male   Other Topics Concern     Parent/sibling w/ CABG, MI or angioplasty before 65F 55M? Not Asked   Social History Narrative     Not on file     Social Determinants of Health     Financial Resource Strain:      Difficulty of Paying Living Expenses:    Food Insecurity:      Worried About Running Out of Food in the Last Year:      Ran Out of Food in the Last Year:    Transportation Needs:      Lack of Transportation (Medical):      Lack of Transportation (Non-Medical):    Physical Activity:      Days of Exercise per Week:      Minutes of Exercise per Session:    Stress:      Feeling of Stress :    Social Connections:      Frequency of Communication with Friends and Family:      Frequency of Social Gatherings with Friends and Family:      Attends [employer] Services:      Active Member of Clubs or Organizations:      Attends Club or Organization Meetings:      Marital Status:    Intimate Partner Violence:      Fear of Current or Ex-Partner:      Emotionally Abused:      Physically Abused:      Sexually Abused:                Family History:     Family History   Problem Relation Age of Onset     Cancer Mother         angiosarcoma     Coronary Artery Disease Early Onset Father         MI age 46     Heart Transplant  Father      Liver Disease No family hx of      Ulcerative Colitis No family hx of      Crohn's Disease No family hx of               Allergies:      Allergies   Allergen Reactions     Diagnostic X-Ray Materials Hives     PATIENT HAD HIVE REACTION AFTER ADMINISTERING CT CONTRAST DYE.       Contrast Dye                Medications:     Prior to Admission medications    Medication Sig Last Dose Taking? Auth Provider   ciprofloxacin (CIPRO) 500 MG tablet Take 1 tablet (500 mg) by mouth every 24 hours   Leventhal, Thomas Michael, MD   furosemide (LASIX) 20 MG tablet Take 2 tablets (40 mg) by mouth daily   Leventhal, Thomas Michael, MD   lactulose (CEPHULAC) 10 GM packet Take 2 packets (20 g) by mouth 2 times daily (Titrate up to 4 times daily if not having at least 2 bowel movements daily)   Leventhal, Thomas Michael, MD   multivitamin (CENTRUM SILVER) tablet Take 1 tablet by mouth daily   Unknown, Entered By History   omeprazole (PRILOSEC) 20 MG DR capsule Take 2 capsules (40 mg) by mouth 2 times daily   Leventhal, Thomas Michael, MD   ondansetron (ZOFRAN-ODT) 4 MG ODT tab Take 1 tablet (4 mg) by mouth every 8 hours as needed for nausea   Tila Nevarez NP   polyethylene glycol (MIRALAX) 17 GM/Dose powder Take 17 g by mouth daily  Patient taking differently: Take 17 g by mouth daily as needed    Augustine Pena MD   spironolactone (ALDACTONE) 50 MG tablet Take 2 tablets (100 mg) by mouth daily   Leventhal, Thomas Michael, MD   ursodiol (ACTIGALL) 300 MG capsule Take 1 capsule (300 mg) by mouth 2 times daily   Leventhal, Thomas Michael, MD              Review of Systems:   A Comprehensive greater than 10 system review of systems was carried out.  Pertinent positives and negatives are noted above.  Otherwise negative for contributory information.            Physical Exam:   Blood pressure 100/64, pulse 82, temperature 97.7  F (36.5  C), temperature source Oral, resp. rate 14, SpO2 100 %, not currently  breastfeeding.  Exam:  GENERAL:  Comfortable. Frail appearing.   PSYCH: No acute distress. Somnolent but arousable and follows commands  HEENT:  PERRLA. Normal conjunctiva, normal hearing, nasal mucosa and Oropharynx are normal.  NECK:  Supple, no neck vein distention, adenopathy or bruits, normal thyroid.  HEART:  Normal S1, S2 with no murmur, no pericardial rub, gallops or S3 or S4.  LUNGS:  Clear to auscultation, normal Respiratory effort. No wheezing, rales or ronchi.  ABDOMEN:  Soft, no hepatosplenomegaly, normal bowel sounds. Non-tender, distension noted  EXTREMITIES:  No pedal edema, +2 pulses bilateral and equal.  SKIN:  Dry to touch, No rash, wound or ulcerations.  NEUROLOGIC:  CN 2-12 grossly intact, moving all limbs equally              Data:     Recent Labs   Lab 11/06/21  0410   WBC 16.0*   HGB 10.6*   HCT 30.9*   MCV 89        Recent Labs   Lab 11/06/21  0410 11/04/21  1058 11/01/21  1338   * 123* 122*   POTASSIUM 4.4  --  4.0   CHLORIDE 93*  --  92*   CO2 20  --  18*   ANIONGAP 13  --  12   *  --  92   BUN 39*  --  26   CR 1.26* 1.05* 0.84   GFRESTIMATED 47* 59* 77   MARIELENA 10.1  --  9.1   PROTTOTAL 7.5  --  6.8   ALBUMIN 2.3* 2.1* 2.2*   BILITOTAL 6.8* 6.4* 7.0*   ALKPHOS 314*  --  306*   AST 69*  --  61*   ALT 43  --  37     No results for input(s): LACT in the last 168 hours.  No results for input(s): TROPONIN, TROPI, TROPR in the last 168 hours.    Invalid input(s): TROP, TROPONINIES      No results found for this or any previous visit (from the past 24 hour(s)).      Jordyn Umanzor PA-C    This patient was seen and discussed with Dr. Gates who agrees with the current plans as outlined above.

## 2021-11-06 NOTE — ED TRIAGE NOTES
Increased confusion and agitation tonight.  Pt hascirrhosis, presents with a distended abdomen and jaundice.  Hx/o hepatic encephalopathy.

## 2021-11-07 LAB
ALBUMIN SERPL-MCNC: 2.1 G/DL (ref 3.4–5)
ALBUMIN SERPL-MCNC: 2.1 G/DL (ref 3.4–5)
ALP SERPL-CCNC: 297 U/L (ref 40–150)
ALT SERPL W P-5'-P-CCNC: 40 U/L (ref 0–50)
AMMONIA PLAS-SCNC: 24 UMOL/L (ref 10–50)
ANION GAP SERPL CALCULATED.3IONS-SCNC: 9 MMOL/L (ref 3–14)
AST SERPL W P-5'-P-CCNC: 68 U/L (ref 0–45)
BILIRUB SERPL-MCNC: 6 MG/DL (ref 0.2–1.3)
BUN SERPL-MCNC: 29 MG/DL (ref 7–30)
CALCIUM SERPL-MCNC: 9.3 MG/DL (ref 8.5–10.1)
CHLORIDE BLD-SCNC: 95 MMOL/L (ref 94–109)
CO2 SERPL-SCNC: 22 MMOL/L (ref 20–32)
CREAT SERPL-MCNC: 0.92 MG/DL (ref 0.52–1.04)
ERYTHROCYTE [DISTWIDTH] IN BLOOD BY AUTOMATED COUNT: 15.2 % (ref 10–15)
GFR SERPL CREATININE-BSD FRML MDRD: 69 ML/MIN/1.73M2
GLUCOSE BLD-MCNC: 95 MG/DL (ref 70–99)
HCT VFR BLD AUTO: 30.6 % (ref 35–47)
HGB BLD-MCNC: 10 G/DL (ref 11.7–15.7)
LACTATE SERPL-SCNC: 2.1 MMOL/L (ref 0.7–2)
MCH RBC QN AUTO: 30.4 PG (ref 26.5–33)
MCHC RBC AUTO-ENTMCNC: 32.7 G/DL (ref 31.5–36.5)
MCV RBC AUTO: 93 FL (ref 78–100)
PLATELET # BLD AUTO: 378 10E3/UL (ref 150–450)
POTASSIUM BLD-SCNC: 5 MMOL/L (ref 3.4–5.3)
PROT SERPL-MCNC: 6.7 G/DL (ref 6.8–8.8)
RBC # BLD AUTO: 3.29 10E6/UL (ref 3.8–5.2)
SODIUM SERPL-SCNC: 126 MMOL/L (ref 133–144)
WBC # BLD AUTO: 15.9 10E3/UL (ref 4–11)

## 2021-11-07 PROCEDURE — 83605 ASSAY OF LACTIC ACID: CPT | Performed by: HOSPITALIST

## 2021-11-07 PROCEDURE — 36415 COLL VENOUS BLD VENIPUNCTURE: CPT | Performed by: PHYSICIAN ASSISTANT

## 2021-11-07 PROCEDURE — 99233 SBSQ HOSP IP/OBS HIGH 50: CPT | Performed by: HOSPITALIST

## 2021-11-07 PROCEDURE — 85027 COMPLETE CBC AUTOMATED: CPT | Performed by: PHYSICIAN ASSISTANT

## 2021-11-07 PROCEDURE — 80053 COMPREHEN METABOLIC PANEL: CPT | Performed by: PHYSICIAN ASSISTANT

## 2021-11-07 PROCEDURE — 258N000003 HC RX IP 258 OP 636: Performed by: PHYSICIAN ASSISTANT

## 2021-11-07 PROCEDURE — 250N000013 HC RX MED GY IP 250 OP 250 PS 637: Performed by: INTERNAL MEDICINE

## 2021-11-07 PROCEDURE — 36415 COLL VENOUS BLD VENIPUNCTURE: CPT | Performed by: HOSPITALIST

## 2021-11-07 PROCEDURE — 250N000013 HC RX MED GY IP 250 OP 250 PS 637: Performed by: PHYSICIAN ASSISTANT

## 2021-11-07 PROCEDURE — 120N000001 HC R&B MED SURG/OB

## 2021-11-07 PROCEDURE — 82140 ASSAY OF AMMONIA: CPT | Performed by: PHYSICIAN ASSISTANT

## 2021-11-07 PROCEDURE — 258N000003 HC RX IP 258 OP 636: Performed by: INTERNAL MEDICINE

## 2021-11-07 RX ADMIN — SODIUM CHLORIDE 500 ML: 9 INJECTION, SOLUTION INTRAVENOUS at 05:46

## 2021-11-07 RX ADMIN — SODIUM CHLORIDE, POTASSIUM CHLORIDE, SODIUM LACTATE AND CALCIUM CHLORIDE: 600; 310; 30; 20 INJECTION, SOLUTION INTRAVENOUS at 00:30

## 2021-11-07 RX ADMIN — RIFAXIMIN 550 MG: 550 TABLET ORAL at 20:12

## 2021-11-07 RX ADMIN — OMEPRAZOLE 40 MG: 20 CAPSULE, DELAYED RELEASE ORAL at 09:53

## 2021-11-07 RX ADMIN — URSODIOL 300 MG: 300 CAPSULE ORAL at 09:53

## 2021-11-07 RX ADMIN — SODIUM CHLORIDE, POTASSIUM CHLORIDE, SODIUM LACTATE AND CALCIUM CHLORIDE: 600; 310; 30; 20 INJECTION, SOLUTION INTRAVENOUS at 13:43

## 2021-11-07 RX ADMIN — URSODIOL 300 MG: 300 CAPSULE ORAL at 20:12

## 2021-11-07 RX ADMIN — RIFAXIMIN 550 MG: 550 TABLET ORAL at 09:53

## 2021-11-07 RX ADMIN — OMEPRAZOLE 40 MG: 20 CAPSULE, DELAYED RELEASE ORAL at 20:12

## 2021-11-07 RX ADMIN — CIPROFLOXACIN 500 MG: 250 TABLET ORAL at 09:53

## 2021-11-07 RX ADMIN — LACTULOSE 20 G: 10 SOLUTION ORAL at 04:10

## 2021-11-07 ASSESSMENT — ACTIVITIES OF DAILY LIVING (ADL)
ADLS_ACUITY_SCORE: 9
ADLS_ACUITY_SCORE: 12
ADLS_ACUITY_SCORE: 14
ADLS_ACUITY_SCORE: 11
ADLS_ACUITY_SCORE: 12
ADLS_ACUITY_SCORE: 14
ADLS_ACUITY_SCORE: 12
ADLS_ACUITY_SCORE: 14
ADLS_ACUITY_SCORE: 12
ADLS_ACUITY_SCORE: 11
ADLS_ACUITY_SCORE: 12
ADLS_ACUITY_SCORE: 14
ADLS_ACUITY_SCORE: 11
ADLS_ACUITY_SCORE: 14
ADLS_ACUITY_SCORE: 12
ADLS_ACUITY_SCORE: 14
ADLS_ACUITY_SCORE: 12
ADLS_ACUITY_SCORE: 14
ADLS_ACUITY_SCORE: 10
ADLS_ACUITY_SCORE: 12

## 2021-11-07 NOTE — PLAN OF CARE
Pt A/Ox4.  Abdomen distended-reports gets paracentesis  weekly.  Jaundice, sclera yellow.  Having multiple bowel movements- no lactulose needed this shift.  Voiding. IV fluids infusing. Tolerating regular diet.  Up with Ax1 et gait belt. Will continue to monitor.

## 2021-11-07 NOTE — PROGRESS NOTES
"[facility]UAL [facility] Progress Note  [facility]    Saw pt Berta per an admission request for  support.  This author is familiar with pt from previous admissions.  Berta reported that she was admitted for confusion but feels that it is clearing.  She acknowledged feeling \"tired\" with the reoccurring hospitalizations for confusion and gave voice to her hope that she will receive \"the call\" some time soon for her liver transplant.  Berta reflected that her kahlil has become \"more comforting\" during this time of waiting and not knowing.  She welcomed prayer and engaged in few moments of guided contemplative breathing before praying.  She continued to engage in the contemplative breathing for a few minutes after prayer and reported that she found it \"comforting.\"    Plan: Reviewed with pt how she can request further  support.  This author and other chaplains remain available per pt/family request.    Ronaldo Nieves M.Div., [facility]  Staff   Phone [phone]    "

## 2021-11-07 NOTE — CONSULTS
GASTROENTEROLOGY CONSULTATION     Berta Nava  [address] [facility] [id number]  56 year old female    Admission Date/Time: 11/6/2021  Primary Care Provider: Tila Nevarez    We were asked to see the patient in consultation by Jordyn Umanzor for evaluation of hepatic encephalopathy.        HPI:  Berta Nava is a 56 year old female who I know from a recent admission for hepatic encephalopathy.    She has a past medical history for hypertension, microscopic colitis, ulcerative colitis, pancreas mucinous cystadenoma, primary sclerosing cholangitis complicated with cirrhosis, ascites, hepatic encephalopathy.  She had a recent admission in August 2021 for liver transplant at the [facility] but that was aborted secondary to a wide-complex tachycardia that occurred intraoperatively, she had a cardiac evaluation, is now relisted for transplant.  She gets weekly paracentesis, ERCP every 4 to 6 weeks for clearance of the bile ducts (most recently October 1).    She was admitted to [facility] on October 25 for hepatic encephalopathy that was felt to be secondary to her not stooling well.  Her mental status cleared with increase in lactulose.    She still appears to be a bit confused and I am not sure how reliable her history is.  She reports she has been taking the lactulose at home but just restarted it this past Wednesday, however when I asked her if she has been taking it since discharge from the hospital she says yes.  She also reported that some days she will have a bowel movement and other days she will not.  In reviewing her admission note, there is report that the patient has been having nausea and unable to take some of her lactulose doses recently.  The patient currently reports that her nausea has resolved on medications that she was given by her primary care provider.            ROS: A comprehensive ten point review of systems was negative aside from those in  mentioned in the HPI.      MEDICATIONS: No current facility-administered medications on file prior to encounter.  ciprofloxacin (CIPRO) 500 MG tablet, Take 1 tablet (500 mg) by mouth every 24 hours  furosemide (LASIX) 20 MG tablet, Take 2 tablets (40 mg) by mouth daily  lactulose (CEPHULAC) 10 GM packet, Take 2 packets (20 g) by mouth 2 times daily (Titrate up to 4 times daily if not having at least 2 bowel movements daily)  multivitamin (CENTRUM SILVER) tablet, Take 1 tablet by mouth daily  omeprazole (PRILOSEC) 20 MG DR capsule, Take 2 capsules (40 mg) by mouth 2 times daily  polyethylene glycol (MIRALAX) 17 g packet, Take 17 g by mouth daily as needed for constipation  spironolactone (ALDACTONE) 50 MG tablet, Take 2 tablets (100 mg) by mouth daily  ursodiol (ACTIGALL) 300 MG capsule, Take 1 capsule (300 mg) by mouth 2 times daily  ondansetron (ZOFRAN-ODT) 4 MG ODT tab, Take 1 tablet (4 mg) by mouth every 8 hours as needed for nausea        ALLERGIES:   Allergies   Allergen Reactions     Diagnostic X-Ray Materials Hives     PATIENT HAD HIVE REACTION AFTER ADMINISTERING CT CONTRAST DYE.       Contrast Dye        Past Medical History:   Diagnosis Date     Ascites      Biliary cirrhosis (H)      Cholangitis, sclerosing      Cirrhosis of liver with ascites (H) 3/3/2021     Hearing loss of left ear     wears a hearing aide     History of low potassium      Hyperlipidemia      Hypertension      SBP (spontaneous bacterial peritonitis) (H) 4/30/2021     Sjogren's syndrome (H)      Ulcerative colitis (H)      Ulcerative pancolitis (H)        Past Surgical History:   Procedure Laterality Date     CV CORONARY ANGIOGRAM N/A 8/25/2021    Procedure: Coronary Angiogram with possible intervention;  Surgeon: David Wilhelm MD;  Location:  [facility] CARDI[facility] CATH LAB     ENDOSCOPIC RETROGRADE CHOLANGIOPANCREATOGRAM N/A 6/25/2021    Procedure: ENDOSCOPIC RETROGRADE CHOLANGIOPANCREATOGRAPHY with ballon sweep of bile ducts for  stones, balloon dilation of bile ducts and bile duct stent placement;  Surgeon: Gregory Gabriel MD;  Location: [facility] OR     ENDOSCOPIC RETROGRADE CHOLANGIOPANCREATOGRAM N/A 7/22/2021    Procedure: ENDOSCOPIC RETROGRADE CHOLANGIOPANCREATOGRAPHY STONE REMOVAL, DILATION AND STENT PLACEMENT;  Surgeon: Gregory Gabriel MD;  Location: [facility] OR     ENDOSCOPIC RETROGRADE CHOLANGIOPANCREATOGRAPHY, EXCHANGE TUBE/STENT N/A 05/05/2021    Procedure: ENDOSCOPIC RETROGRADE CHOLANGIOPANCREATOGRAPHY WITH STENT EXCHANGE, STONE EXTRACTION, AND DILATION;  Surgeon: Gregory Gabriel MD;  Location: [facility] OR     ENDOSCOPIC RETROGRADE CHOLANGIOPANCREATOGRAPHY, EXCHANGE TUBE/STENT N/A 5/10/2021    Procedure: ENDOSCOPIC RETROGRADE CHOLANGIOPANCREATOGRAPHY with biliary dilation, stone removal, stent exchange;  Surgeon: Gregory Gabriel MD;  Location: [facility] OR     ENDOSCOPIC RETROGRADE CHOLANGIOPANCREATOGRAPHY, EXCHANGE TUBE/STENT N/A 6/10/2021    Procedure: ENDOSCOPIC RETROGRADE CHOLANGIOPANCREATOGRAPHY, WITH biliary stent exchange, stone removal;  Surgeon: Woodrow Lott MD;  Location: [facility] OR     ENDOSCOPIC RETROGRADE CHOLANGIOPANCREATOGRAPHY, EXCHANGE TUBE/STENT N/A 8/30/2021    Procedure: ENDOSCOPIC RETROGRADE CHOLANGIOPANCREATOGRAPHY with biliary dilation, stone removal, stent exchange;  Surgeon: Gregory Gabriel MD;  Location: [facility] OR     ENDOSCOPIC RETROGRADE CHOLANGIOPANCREATOGRAPHY, EXCHANGE TUBE/STENT N/A 10/1/2021    Procedure: ENDOSCOPIC RETROGRADE CHOLANGIOPANCREATOGRAPHY with balloon sweep of bile ducts, bile duct stent exchanged;  Surgeon: Gregory Gabriel MD;  Location: [facility]     ESOPHAGOSCOPY, GASTROSCOPY, DUODENOSCOPY (EGD), COMBINED N/A 9/4/2021    Procedure: ESOPHAGOGASTRODUODENOSCOPY (EGD);  Surgeon: Leventhal, Thomas Michael, MD;  Location:  GI     ESOPHAGOSCOPY, GASTROSCOPY, DUODENOSCOPY (EGD), COMBINED N/A 10/1/2021    Procedure: ESOPHAGOGASTRODUODENOSCOPY (EGD) with varices banding;  Surgeon: Gregory Gabriel MD;  Location: [facility] OR     GYN  SURGERY      bilat fallopian tubes and ovaries removed     PICC DOUBLE LUMEN PLACEMENT Right 2021    42cm (2cm external), Lateral brachial vein     TRANSPLANT LIVER RECIPIENT  DONOR N/A 2021    Procedure: Opening of abdomen, Abdominal Exploration and aborted liver transplant.;  Surgeon: Ernesto Schmitt MD;  Location:  OR         SOCIAL HISTORY:  Social History     Tobacco Use     Smoking status: Never Smoker     Smokeless tobacco: Never Used   Vaping Use     Vaping Use: Never used   Substance Use Topics     Alcohol use: No     Comment: Last drink was      Drug use: No       FAMILY HISTORY:  Reviewed in her chart.    PHYSICAL EXAM:   /62 (BP Location: Right arm)   Pulse 89   Temp 97.9  F (36.6  C) (Temporal)   Resp 16   Wt 59.5 kg (131 lb 1.6 oz)   LMP  (LMP Unknown)   SpO2 100%   BMI 21.48 kg/m      Constitutional: NAD, comfortable  Cardiovascular: RRR  Respiratory: CTAB  Psychiatric: mentation appears slightly confused.  Head: Normocephalic. Atraumatic.    Neck: Neck supple.   Eyes:  icterus  ENT:  hearing adequate  Abdomen:   Distended with ascites.  NEURO: grossly negative  SKIN: no suspicious lesions or rashes          ADDITIONAL COMMENTS:   I reviewed the patient's new clinical lab test results.   Recent Labs   Lab Test 210 21  1058 10/29/21  1029 10/27/21  0714   WBC 15.9* 16.0*  --   --  13.2*   HGB 10.0* 10.6*  --   --  9.4*   MCV 93 89  --   --  91    381  --   --  311   INR  --  1.30* 1.20* 1.30*  --      Recent Labs   Lab Test 21  0755 21  0410 21  1058 21  1338   * 126* 123* 122*   POTASSIUM 5.0 4.4  --  4.0   CHLORIDE 95 93*  --  92*   CO2 22 20  --  18*   BUN 29 39*  --  26   CR 0.92 1.26* 1.05* 0.84   ANIONGAP 9 13  --  12   MARIELENA 9.3 10.1  --  9.1   GLC 95 133*  --  92     Recent Labs   Lab Test 21  0755 21  0509 21  0410 21  1058 21  1338 10/26/21  0651  10/25/21  1339 10/25/21  1046 10/19/21  1654 10/01/21  0632 09/02/21  0359 09/01/21  1757 08/19/21  0513 08/18/21  2132 07/22/21  0804 06/25/21  0909   ALBUMIN 2.1*  --  2.3* 2.1* 2.2*   < >  --  2.5*   < > 2.4*   < >  --    < > 2.0*   < > 2.8*   BILITOTAL 6.0*  --  6.8* 6.4* 7.0*   < >  --  5.8*   < > 4.0*   < >  --    < > 3.6*   < > 3.3*   ALT 40  --  43  --  37   < >  --  39  --  36   < >  --    < > 36   < > 32   AST 68*  --  69*  --  61*   < >  --  59*  --  52*   < >  --    < > 66*   < > 58*   ALKPHOS 297*  --  314*  --  306*   < >  --  308*  --  323*   < >  --    < > 400*   < > 386*   PROTEIN  --  Negative  --   --   --   --  Negative  --   --   --   --  20 *   < >  --   --   --    LIPASE  --   --   --   --   --   --   --  248  --  517*  --   --   --   --   --  241   AMYLASE  --   --   --   --   --   --   --   --   --  90  --   --   --  68  --  66    < > = values in this interval not displayed.             .    CONSULTATION ASSESSMENT AND PLAN:    History of decompensated PSC cirrhosis with  Ascites and hepatic encephalopathy, on liver transplant list at [facility].  She is admitted for recurrent hepatic encephalopathy, that is likely secondary to missed doses of lactulose in the setting of nausea that fortunately has resolved on Zofran that was prescribed by her primary care provider.  However it is still important to rule out an infectious cause of her hepatic encephalopathy.  UA is negative for infection.  Chest x-ray is negative for infection.  Blood culture pending.  She should have a diagnostic and therapeutic paracentesis.  Of note she gets these weekly and they have been negative for infection.  She is also on SBP prophylaxis with ciprofloxacin.  Continue lactulose for 3-5 soft bowel movements per day, avoid diarrhea.  We will continue to follow.    Also history of UC, on no treatment.    Isadora Hernandez MD  [facility]

## 2021-11-07 NOTE — PLAN OF CARE
Vitals stable and afebrile. Eating box lunch and vanilla pudding. Called  on phone. He will be in to visit in morning and bring her some items. Denies pain. Jaundiced and weak. Assisted with movitity. Disoriented to time and reoriented.

## 2021-11-07 NOTE — PLAN OF CARE
Patient vital signs are at baseline: No,  Reason:  Hypotensive BP. Lowest was BP wasw 83/50. Hosp. Informed. Bolus order completed.  Patient able to ambulate as they were prior to admission or with assist devices provided by therapies during their stay:  No,  Reason:  Ax1 with gait belt.   Patient MUST void prior to discharge:  Yes  Patient able to tolerate oral intake:  Yes  Pain has adequate pain control using Oral analgesics:  No, patient has limited pain relief options. Applied ice pack to LHS.    A&Ox4. Neuro checks positive. 02 - 100% on RA. LS clear. Last BM 0100 today. Loose stool. Followed Sepsis protocol. Lactic Acid lab result 2.1.

## 2021-11-07 NOTE — PROGRESS NOTES
Hypotensive to the 80-90 systolic range.  Asymptomatic.  Has end-stage liver disease.  Flutter with GONZALO and vomiting.  Lactic acid slightly elevated 2.1.  -Give 500 ml NS bolus

## 2021-11-08 ENCOUNTER — APPOINTMENT (OUTPATIENT)
Dept: ULTRASOUND IMAGING | Facility: CLINIC | Age: 56
DRG: 441 | End: 2021-11-08
Attending: HOSPITALIST
Payer: COMMERCIAL

## 2021-11-08 LAB
% LINING CELLS, BODY FLUID: 2 %
ALBUMIN FLD-MCNC: 0.1 G/DL
ALBUMIN SERPL-MCNC: 1.9 G/DL (ref 3.4–5)
ALP SERPL-CCNC: 278 U/L (ref 40–150)
ALT SERPL W P-5'-P-CCNC: 35 U/L (ref 0–50)
ANION GAP SERPL CALCULATED.3IONS-SCNC: 11 MMOL/L (ref 3–14)
APPEARANCE FLD: CLEAR
AST SERPL W P-5'-P-CCNC: 58 U/L (ref 0–45)
ATRIAL RATE - MUSE: 79 BPM
BILIRUB SERPL-MCNC: 6.2 MG/DL (ref 0.2–1.3)
BUN SERPL-MCNC: 26 MG/DL (ref 7–30)
CALCIUM SERPL-MCNC: 9.1 MG/DL (ref 8.5–10.1)
CHLORIDE BLD-SCNC: 95 MMOL/L (ref 94–109)
CO2 SERPL-SCNC: 19 MMOL/L (ref 20–32)
COLOR FLD: YELLOW
CREAT SERPL-MCNC: 0.86 MG/DL (ref 0.52–1.04)
DIASTOLIC BLOOD PRESSURE - MUSE: NORMAL MMHG
ERYTHROCYTE [DISTWIDTH] IN BLOOD BY AUTOMATED COUNT: 15.1 % (ref 10–15)
GFR SERPL CREATININE-BSD FRML MDRD: 75 ML/MIN/1.73M2
GLUCOSE BLD-MCNC: 123 MG/DL (ref 70–99)
GRAM STAIN RESULT: NORMAL
GRAM STAIN RESULT: NORMAL
HCT VFR BLD AUTO: 30.3 % (ref 35–47)
HGB BLD-MCNC: 9.7 G/DL (ref 11.7–15.7)
INTERPRETATION ECG - MUSE: NORMAL
LYMPHOCYTES NFR FLD MANUAL: 41 %
MCH RBC QN AUTO: 29.8 PG (ref 26.5–33)
MCHC RBC AUTO-ENTMCNC: 32 G/DL (ref 31.5–36.5)
MCV RBC AUTO: 93 FL (ref 78–100)
MONOS+MACROS NFR FLD MANUAL: 51 %
NEUTS BAND NFR FLD MANUAL: 6 %
P AXIS - MUSE: 25 DEGREES
PLATELET # BLD AUTO: 340 10E3/UL (ref 150–450)
POTASSIUM BLD-SCNC: 4 MMOL/L (ref 3.4–5.3)
PR INTERVAL - MUSE: 146 MS
PROT FLD-MCNC: 0.4 G/DL
PROT SERPL-MCNC: 6.6 G/DL (ref 6.8–8.8)
QRS DURATION - MUSE: 96 MS
QT - MUSE: 414 MS
QTC - MUSE: 474 MS
R AXIS - MUSE: 35 DEGREES
RBC # BLD AUTO: 3.25 10E6/UL (ref 3.8–5.2)
SODIUM SERPL-SCNC: 125 MMOL/L (ref 133–144)
SYSTOLIC BLOOD PRESSURE - MUSE: NORMAL MMHG
T AXIS - MUSE: 42 DEGREES
VENTRICULAR RATE- MUSE: 79 BPM
WBC # BLD AUTO: 13.3 10E3/UL (ref 4–11)
WBC # FLD AUTO: 72 /UL

## 2021-11-08 PROCEDURE — 258N000003 HC RX IP 258 OP 636: Performed by: PHYSICIAN ASSISTANT

## 2021-11-08 PROCEDURE — 99207 PR CDG-MDM COMPONENT: MEETS LOW - DOWN CODED: CPT | Performed by: HOSPITALIST

## 2021-11-08 PROCEDURE — P9047 ALBUMIN (HUMAN), 25%, 50ML: HCPCS | Performed by: HOSPITALIST

## 2021-11-08 PROCEDURE — 82042 OTHER SOURCE ALBUMIN QUAN EA: CPT | Performed by: HOSPITALIST

## 2021-11-08 PROCEDURE — 120N000001 HC R&B MED SURG/OB

## 2021-11-08 PROCEDURE — 250N000013 HC RX MED GY IP 250 OP 250 PS 637: Performed by: PHYSICIAN ASSISTANT

## 2021-11-08 PROCEDURE — 89051 BODY FLUID CELL COUNT: CPT | Performed by: HOSPITALIST

## 2021-11-08 PROCEDURE — 87205 SMEAR GRAM STAIN: CPT | Performed by: HOSPITALIST

## 2021-11-08 PROCEDURE — 250N000013 HC RX MED GY IP 250 OP 250 PS 637: Performed by: INTERNAL MEDICINE

## 2021-11-08 PROCEDURE — 87070 CULTURE OTHR SPECIMN AEROBIC: CPT | Performed by: HOSPITALIST

## 2021-11-08 PROCEDURE — 80053 COMPREHEN METABOLIC PANEL: CPT | Performed by: PHYSICIAN ASSISTANT

## 2021-11-08 PROCEDURE — 36415 COLL VENOUS BLD VENIPUNCTURE: CPT | Performed by: PHYSICIAN ASSISTANT

## 2021-11-08 PROCEDURE — 99232 SBSQ HOSP IP/OBS MODERATE 35: CPT | Performed by: HOSPITALIST

## 2021-11-08 PROCEDURE — 49083 ABD PARACENTESIS W/IMAGING: CPT

## 2021-11-08 PROCEDURE — 85027 COMPLETE CBC AUTOMATED: CPT | Performed by: PHYSICIAN ASSISTANT

## 2021-11-08 PROCEDURE — 0W9G3ZX DRAINAGE OF PERITONEAL CAVITY, PERCUTANEOUS APPROACH, DIAGNOSTIC: ICD-10-PCS | Performed by: HOSPITALIST

## 2021-11-08 PROCEDURE — 84157 ASSAY OF PROTEIN OTHER: CPT | Performed by: HOSPITALIST

## 2021-11-08 PROCEDURE — 89050 BODY FLUID CELL COUNT: CPT | Performed by: HOSPITALIST

## 2021-11-08 PROCEDURE — 250N000011 HC RX IP 250 OP 636: Performed by: HOSPITALIST

## 2021-11-08 PROCEDURE — 250N000009 HC RX 250: Performed by: RADIOLOGY

## 2021-11-08 RX ORDER — ALBUMIN (HUMAN) 12.5 G/50ML
25 SOLUTION INTRAVENOUS ONCE
Status: COMPLETED | OUTPATIENT
Start: 2021-11-08 | End: 2021-11-08

## 2021-11-08 RX ADMIN — OMEPRAZOLE 40 MG: 20 CAPSULE, DELAYED RELEASE ORAL at 08:56

## 2021-11-08 RX ADMIN — OMEPRAZOLE 40 MG: 20 CAPSULE, DELAYED RELEASE ORAL at 20:12

## 2021-11-08 RX ADMIN — URSODIOL 300 MG: 300 CAPSULE ORAL at 08:56

## 2021-11-08 RX ADMIN — LACTULOSE 20 G: 10 SOLUTION ORAL at 20:12

## 2021-11-08 RX ADMIN — RIFAXIMIN 550 MG: 550 TABLET ORAL at 20:12

## 2021-11-08 RX ADMIN — LACTULOSE 20 G: 10 SOLUTION ORAL at 14:20

## 2021-11-08 RX ADMIN — CIPROFLOXACIN 500 MG: 250 TABLET ORAL at 08:56

## 2021-11-08 RX ADMIN — LIDOCAINE HYDROCHLORIDE 10 ML: 10 INJECTION, SOLUTION EPIDURAL; INFILTRATION; INTRACAUDAL; PERINEURAL at 10:16

## 2021-11-08 RX ADMIN — ALBUMIN HUMAN 25 G: 0.25 SOLUTION INTRAVENOUS at 14:21

## 2021-11-08 RX ADMIN — RIFAXIMIN 550 MG: 550 TABLET ORAL at 08:56

## 2021-11-08 RX ADMIN — SODIUM CHLORIDE, POTASSIUM CHLORIDE, SODIUM LACTATE AND CALCIUM CHLORIDE: 600; 310; 30; 20 INJECTION, SOLUTION INTRAVENOUS at 01:59

## 2021-11-08 RX ADMIN — URSODIOL 300 MG: 300 CAPSULE ORAL at 20:12

## 2021-11-08 RX ADMIN — LACTULOSE 20 G: 10 SOLUTION ORAL at 04:25

## 2021-11-08 ASSESSMENT — ACTIVITIES OF DAILY LIVING (ADL)
ADLS_ACUITY_SCORE: 10
ADLS_ACUITY_SCORE: 14
ADLS_ACUITY_SCORE: 8
ADLS_ACUITY_SCORE: 10
ADLS_ACUITY_SCORE: 8
ADLS_ACUITY_SCORE: 10
ADLS_ACUITY_SCORE: 10
ADLS_ACUITY_SCORE: 14
ADLS_ACUITY_SCORE: 14
ADLS_ACUITY_SCORE: 8
ADLS_ACUITY_SCORE: 8
ADLS_ACUITY_SCORE: 10
DEPENDENT_IADLS:: INDEPENDENT
ADLS_ACUITY_SCORE: 10
ADLS_ACUITY_SCORE: 14
ADLS_ACUITY_SCORE: 14
ADLS_ACUITY_SCORE: 10
ADLS_ACUITY_SCORE: 8
ADLS_ACUITY_SCORE: 14
ADLS_ACUITY_SCORE: 10
ADLS_ACUITY_SCORE: 8
ADLS_ACUITY_SCORE: 10

## 2021-11-08 NOTE — PROGRESS NOTES
[facility]    Hospitalist Progress Note    Date of Service (when I saw the patient): 11/08/2021  Provider:  Nehemiah Carolina MD   Text Page  7am - 6PM       Assessment & Plan   Berta AYE Nava is a medically complex 56 year old female with a PMH significant for primary sclerosing cholangitis, cirrhosis complicated by ascites requiring paracentesis, pancreatic mucinous cystoadenoma, ulcerative colitis, microscopic colitis and hypertension who presents with altered mental status.    #Hyperammoniaemia  #Hepatic encephalopathy  Presents with increased confusion and somnolence.  Reportedly has been bumping into things and more agitated at home but unable to confirm this with family members. Suspect that her symptoms were related to elevated ammonia but not to underlying infection at this time.  -Titrated lactulose to have at least 4 bowel movements daily Normal labs and clear mentation.  -No evidence of SBP.  -GI consult appreciate no evidence  -Paracentesis done, removal more than 5 L.  Albumin 25% ordered      #GONZALO suspect due to recent vomiting   -Resolved holding diuretics and giving her fluid resuscitation.  -Hold spironolactone and furosemide, resume on discharge  -Gentle IV fluids with LR at 75 mils per hour.  May discontinue and.  Saline lock tonight  -Monitor for signs of fluid overload     #End-stage liver disease secondary to primary sclerosing cholangitis  She has end-stage liver disease and has been awaiting liver transplant.  Was about to undergo liver transplant in August when she had episode of sustained V. tach requiring cardioversion in the OR so this was aborted.  Patient/family report negative cardiac work-up since.  Also, very recently was turned down for a potential liver as it was too large for her body size.  She follows with [facility] hepatology/transplant clinic.  She gets a weekly paracentesis on Tuesday usually large volume. She takes furosemide and spironolactone at baseline.   She is also on ciprofloxacin SBP prophylaxis and ursodiol.   -Holding furosemide and spironolactone due to dehydration  -Continue ursodiol  -Resume ciprofloxacin for SBP prophylaxis  -Nutrition consult  -Paracentesis requested.    #Chronic leukocytosis  Patient has chronically elevated white blood cell count in the 10-14 range, primarily neutrophils.  This has been present for months per lab review.  As above doubt any new infection at this time.     #Anemia of chronic disease  Hemoglobin 9-10 range which I suspect is secondary to her end-stage liver disease.  No sign of bleeding recently.     #Chronic mild hyponatremia  Sodium at baseline level which is secondary to her end-stage liver disease.  She is on diuretics that have been resumed and sodium appears stable.  -BMP tomorrow     #History ulcerative colitis and microscopic colitis  Not on any specific therapy for       DVT Prophylaxis: Pneumatic Compression Devices  Code Status: Full Code    Disposition: Expected discharge tomorrow, back to baseline..    Interval History   Patient is feeling better today, she seems to be oriented to time place and person, not having fever.  Ammonia has normalized.  Lactulose dose has been reduced given frequent bowel movement.    -Data reviewed today: I reviewed all new labs and imaging results over the last 24 hours.     Physical Exam   Temp: 98.6  F (37  C) Temp src: Tympanic BP: 102/60 Pulse: 82   Resp: 18 SpO2: 100 % O2 Device: None (Room air)    Vitals:    11/06/21 1121 11/07/21 0102 11/08/21 0552   Weight: 57.1 kg (125 lb 14.1 oz) 59.5 kg (131 lb 1.6 oz) 63.2 kg (139 lb 6.4 oz)     Vital Signs with Ranges  Temp:  [97.7  F (36.5  C)-98.6  F (37  C)] 98.6  F (37  C)  Pulse:  [80-95] 82  Resp:  [16-20] 18  BP: ()/(48-68) 102/60  SpO2:  [97 %-100 %] 100 %  No intake/output data recorded.    GEN:  Alert, oriented x 3, appears comfortable, NAD.  HEENT:  Normocephalic/atraumatic, no scleral icterus, no nasal discharge,  mouth moist.  CV:  Regular rate and rhythm, no murmur or JVD.  S1 + S2 noted, no S3 or S4.  LUNGS:  Clear to auscultation bilaterally without rales/rhonchi/wheezing/retractions.  Symmetric chest rise on inhalation noted.  ABD: Prominent.  Active bowel sounds, soft, non-tender/mildly-distended with dependent dullness.  No rebound/guarding/rigidity.  EXT: Bilateral lower extremity edema, no  cyanosis.  No joint synovitis noted.  SKIN:  Dry to touch, no exanthems noted in the visualized areas.       Medications     lactated ringers 75 mL/hr at 11/08/21 0858       albumin human  25 g Intravenous Once     ciprofloxacin  500 mg Oral Q24H     lactulose  20 g Oral Q6H     omeprazole  40 mg Oral BID     rifaximin  550 mg Oral BID     sodium chloride (PF)  3 mL Intracatheter Q8H     ursodiol  300 mg Oral BID       Data   Recent Labs   Lab 11/08/21  0850 11/07/21  1421 11/07/21  0755 11/06/21  0410 11/04/21  1058 11/04/21  1058   WBC 13.3*  --  15.9* 16.0*  --   --    HGB 9.7*  --  10.0* 10.6*  --   --    MCV 93  --  93 89  --   --      --  378 381  --   --    INR  --   --   --  1.30*  --  1.20*   *  --  126* 126*  --  123*   POTASSIUM 4.0  --  5.0 4.4  --   --    CHLORIDE 95  --  95 93*  --   --    CO2 19*  --  22 20  --   --    BUN 26  --  29 39*  --   --    CR 0.86  --  0.92 1.26*  --  1.05*   ANIONGAP 11  --  9 13  --   --    MARIELENA 9.1  --  9.3 10.1  --   --    *  --  95 133*  --   --    ALBUMIN 1.9* 2.1* 2.1* 2.3*  --  2.1*   PROTTOTAL 6.6*  --  6.7* 7.5   < >  --    BILITOTAL 6.2*  --  6.0* 6.8*  --  6.4*   ALKPHOS 278*  --  297* 314*   < >  --    ALT 35  --  40 43   < >  --    AST 58*  --  68* 69*   < >  --     < > = values in this interval not displayed.       Recent Results (from the past 24 hour(s))   US Paracentesis    Narrative    ULTRASOUND GUIDED PARACENTESIS   11/8/2021 12:05 PM     HISTORY:  Ascites    PROCEDURE:   Informed consent was obtained from the patient prior to  the procedure with  discussion including the possible risks of  bleeding, infection and organ injury . Using 5 mL of 1% lidocaine for  local anesthesia, sterile technique, and sonographic guidance with  permanent image documentation, I placed an 8F pigtail catheter into  the peritoneal fluid collection. This was used to aspirate 5150 mL of  yellow, serous fluid in vacuum bottles, and some of this was sent for  any laboratory studies that had been ordered. There were no immediate  complications.       Impression    IMPRESSION:  Ultrasound guided paracentesis.    MARIAM BOYER MD         SYSTEM ID:  [id number]       Disclaimer: This note consists of symbols derived from keyboarding, dictation and/or voice recognition software. As a result, there may be errors in the script that have gone undetected. Please consider this when interpreting information found in this chart.

## 2021-11-08 NOTE — CONSULTS
Care Management Initial Consult    General Information  Assessment completed with: Patient, Berta  Type of CM/SW Visit: Initial Assessment    Primary Care Provider verified and updated as needed: Yes   Readmission within the last 30 days: current reason for admission unrelated to previous admission   Return Category: Progression of disease  Reason for Consult: care coordination/care conference,discharge planning  Advance Care Planning: Advance Care Planning Reviewed: no concerns identified        Communication Assessment  Patient's communication style: spoken language (English or Bilingual)    Hearing Difficulty or Deaf: yes   Wear Glasses or Blind: no    Cognitive  Cognitive/Neuro/Behavioral: WDL  Level of Consciousness: alert  Arousal Level: opens eyes spontaneously  Orientation: oriented x 4  Mood/Behavior: cooperative,calm  Best Language: 0 - No aphasia  Speech: clear    Living Environment:   People in home: spouse  Berta & Demar  Current living Arrangements: house      Able to return to prior arrangements: yes     Family/Social Support:  Care provided by: spouse/significant other,child(yahaira)  Provides care for: no one  Marital Status:   ,Children  Demar       Description of Support System: Supportive,Involved       Current Resources:   Patient receiving home care services: No     Community Resources: None  Equipment currently used at home: none  Supplies currently used at home: None    Employment/Financial:  Employment Status: employed full-time (Works at home)        Financial Concerns: No concerns identified     Lifestyle & Psychosocial Needs:  Social Determinants of Health     Tobacco Use: Low Risk      Smoking Tobacco Use: Never Smoker     Smokeless Tobacco Use: Never Used   Alcohol Use: Not on file   Financial Resource Strain: Not on file   Food Insecurity: Not on file   Transportation Needs: Not on file   Physical Activity: Not on file   Stress: Not on file   Social Connections: Not on file    Intimate Partner Violence: Not on file   Depression: Not at risk     PHQ-2 Score: 0   Housing Stability: Not on file     Functional Status:  Prior to admission patient needed assistance:   Dependent ADLs:: Independent  Dependent IADLs:: Independent     Mental Health Status:  Mental Health Status: No Current Concerns       Chemical Dependency Status:  Chemical Dependency Status: No Current Concerns           Values/Beliefs:  Spiritual, Cultural Beliefs, Tenriism Practices, Values that affect care: no  Description of Beliefs that Will Affect Care: Orthodox       Additional Information:  Patient admitted for hyperammoniaemia & hepatic encephalopathy with an unplanned readmission risk of 29%. CM met with patient at bedside. Introduced self & role. Verified patient lives in a house with her , Demar. She does not have any issues with the stairs. She works full time from home. She has no issues getting her prescriptions filled at the [facility] on [address]. She has a good support system with her , their kids (dtr & son) and her sister.  She declined to have CM schedule a follow up appointment with her primary care provider, Tila Nevarez. She has multiple appointments scheduled to prepare for an ERCP. She usually sees a provider at the [facility]  Transplant Team. She is on the transplant list for a liver transplant.     RN CC will continue to follow for discharge planning.    Brittney Burger RN, BSN, CPHN, CM  Inpatient Care Coordination - [facility]  [phone]

## 2021-11-08 NOTE — PROGRESS NOTES
Patient tolerated paracentesis well. 5,150 mL of yellow fluid drained done by Dr Cervantes  Dressing clean dry and intact with no drainage.  NO albumin given. Patient back to nursing unit via cart.  Report called to bedside RN.  Samples collected in bottle and were brought to lab for processing.

## 2021-11-08 NOTE — PLAN OF CARE
[id number]. VSS. A&Ox4. Reports feeling tired today. Reports mild Lt abd pain, controlled with heating pad and rest. Denies CP or SOB. ABD distended, nontender. Jaundiced skin, yellowed sclera. Skin tear to Lt arm, mepilex in place. Ambulating SBA. LR infusing at 75mL/hr. Possible paracentesis tomorrow. Discharge to home 2-3 days.

## 2021-11-08 NOTE — PLAN OF CARE
Pt A&Ox4. Up with A1, walker. Reported mild discomfort to L flank. Pain managed with use of heat pack and repositioning. Received paracentesis today, had 5,150 ml yellow fluid taking out. Dressing site CDI. Albumin started. IVF LR. Regular diet. Voiding. BM X1. Lactulose given. VSS, RA. Home at discharge tomorrow.

## 2021-11-08 NOTE — PLAN OF CARE
Patient vital signs are at baseline: No,  Reason:  BP hypotensive at start of shift.  Patient able to ambulate as they were prior to admission or with assist devices provided by therapies during their stay:  No,  Reason:  SBA with walker and gait.  Patient MUST void prior to discharge:  Yes  Patient able to tolerate oral intake:  Yes  Pain has adequate pain control using Oral analgesics:  Yes    Patient controls Left flank pain with T pump. Loose stool x1 this shift. Lactulose given. LS clear. IV LR infusing at 75ml/hr. 02 - 98% on RA.

## 2021-11-08 NOTE — PROGRESS NOTES
[facility]    Hospitalist Progress Note    Date of Service (when I saw the patient): 11/07/2021  Provider:  Nehemiah Carolina MD   Text Page  7am - 6PM       Assessment & Plan   Berta AYE Nava is a medically complex 56 year old female with a PMH significant for primary sclerosing cholangitis, cirrhosis complicated by ascites requiring paracentesis, pancreatic mucinous cystoadenoma, ulcerative colitis, microscopic colitis and hypertension who presents with altered mental status.    #Hyperammoniaemia  #Hepatic encephalopathy  Presents with increased confusion and somnolence.  Reportedly has been bumping into things and more agitated at home but unable to confirm this with family members. Suspect that her symptoms were related to elevated ammonia but not to underlying infection at this time.  -Titrated lactulose to have at least 4 bowel movements daily, with normal labs in the morning and clear mentation.  -Will monitor for signs of SBP but does not seems to be likely.  -GI consult appreciated    #GONZALO suspect due to recent vomiting   -Resolved holding diuretics and giving her fluid resuscitation.  -Hold spironolactone and furosemide for now  -Gentle IV fluids with LR at 75 mils per hour  -Monitor for signs of fluid overload     #End-stage liver disease secondary to primary sclerosing cholangitis  She has end-stage liver disease and has been awaiting liver transplant.  Was about to undergo liver transplant in August when she had episode of sustained V. tach requiring cardioversion in the OR so this was aborted.  Patient/family report negative cardiac work-up since.  Also, very recently was turned down for a potential liver as it was too large for her body size.  She follows with [facility] hepatology/transplant clinic.  She gets a weekly paracentesis on Tuesday usually large volume. She takes furosemide and spironolactone at baseline.  She is also on ciprofloxacin SBP prophylaxis and ursodiol.   -Holding  furosemide and spironolactone due to dehydration  -Continue ursodiol  -Resume ciprofloxacin for SBP prophylaxis  -Nutrition consult  -Paracentesis requested.    #Chronic leukocytosis  Patient has chronically elevated white blood cell count in the 10-14 range, primarily neutrophils.  This has been present for months per lab review.  As above doubt any new infection at this time.     #Anemia of chronic disease  Hemoglobin 9-10 range which I suspect is secondary to her end-stage liver disease.  No sign of bleeding recently.     #Chronic mild hyponatremia  Sodium at baseline level which is secondary to her end-stage liver disease.  She is on diuretics that have been resumed and sodium appears stable.  -BMP tomorrow     #History ulcerative colitis and microscopic colitis  Not on any specific therapy for       DVT Prophylaxis: Pneumatic Compression Devices  Code Status: Full Code    Disposition: Expected discharge in 2 - 3 days once paracentesis performed, back to baseline..    Interval History   Patient is feeling better today, she seems to be oriented to time place and person, not having fever.  Ammonia has normalized.  Lactulose dose has been reduced given frequent bowel movement.    -Data reviewed today: I reviewed all new labs and imaging results over the last 24 hours.     Physical Exam   Temp: 98  F (36.7  C) Temp src: Temporal BP: 109/65 Pulse: 89   Resp: 16 SpO2: 99 % O2 Device: None (Room air)    Vitals:    11/06/21 1121 11/07/21 0102   Weight: 57.1 kg (125 lb 14.1 oz) 59.5 kg (131 lb 1.6 oz)     Vital Signs with Ranges  Temp:  [97.9  F (36.6  C)-99.2  F (37.3  C)] 98  F (36.7  C)  Pulse:  [82-92] 89  Resp:  [16] 16  BP: ()/(50-68) 109/65  SpO2:  [99 %-100 %] 99 %  I/O last 3 completed shifts:  In: 1443 [P.O.:840; I.V.:603]  Out: -     GEN:  Alert, oriented x 3, appears comfortable, NAD.  HEENT:  Normocephalic/atraumatic, no scleral icterus, no nasal discharge, mouth moist.  CV:  Regular rate and rhythm,  no murmur or JVD.  S1 + S2 noted, no S3 or S4.  LUNGS:  Clear to auscultation bilaterally without rales/rhonchi/wheezing/retractions.  Symmetric chest rise on inhalation noted.  ABD: Prominent.  Active bowel sounds, soft, non-tender/mildly-distended with dependent dullness.  No rebound/guarding/rigidity.  EXT: Bilateral lower extremity edema, no  cyanosis.  No joint synovitis noted.  SKIN:  Dry to touch, no exanthems noted in the visualized areas.       Medications     lactated ringers 75 mL/hr at 11/07/21 1343       ciprofloxacin  500 mg Oral Q24H     lactulose  20 g Oral Q6H     omeprazole  40 mg Oral BID     rifaximin  550 mg Oral BID     sodium chloride (PF)  3 mL Intracatheter Q8H     ursodiol  300 mg Oral BID       Data   Recent Labs   Lab 11/07/21  1421 11/07/21  0755 11/06/21  0410 11/04/21  1058 11/01/21  1338   WBC  --  15.9* 16.0*  --   --    HGB  --  10.0* 10.6*  --   --    MCV  --  93 89  --   --    PLT  --  378 381  --   --    INR  --   --  1.30* 1.20*  --    NA  --  126* 126* 123* 122*   POTASSIUM  --  5.0 4.4  --  4.0   CHLORIDE  --  95 93*  --  92*   CO2  --  22 20  --  18*   BUN  --  29 39*  --  26   CR  --  0.92 1.26* 1.05* 0.84   ANIONGAP  --  9 13  --  12   MARIELENA  --  9.3 10.1  --  9.1   GLC  --  95 133*  --  92   ALBUMIN 2.1* 2.1* 2.3* 2.1* 2.2*   PROTTOTAL  --  6.7* 7.5  --  6.8   BILITOTAL  --  6.0* 6.8* 6.4* 7.0*   ALKPHOS  --  297* 314*  --  306*   ALT  --  40 43  --  37   AST  --  68* 69*  --  61*       No results found for this or any previous visit (from the past 24 hour(s)).          Disclaimer: This note consists of symbols derived from keyboarding, dictation and/or voice recognition software. As a result, there may be errors in the script that have gone undetected. Please consider this when interpreting information found in this chart.

## 2021-11-08 NOTE — PROGRESS NOTES
GASTROENTEROLOGY PROGRESS NOTE     SUBJECTIVE:  Briefly saw patient prior to leaving room for paracentesis. Mentation clear without full exam. Had one loose stool overnight. No reports of GI bleeding or abd pain.      OBJECTIVE:    /65 (BP Location: Left arm)   Pulse 83   Temp 98.6  F (37  C) (Tympanic)   Resp (P) 20   Wt 63.2 kg (139 lb 6.4 oz)   LMP  (LMP Unknown)   SpO2 99%   BMI 22.84 kg/m    Temp (24hrs), Av.2  F (36.8  C), Min:97.7  F (36.5  C), Max:98.6  F (37  C)    Patient Vitals for the past 72 hrs:   Weight   21 0552 63.2 kg (139 lb 6.4 oz)   21 0102 59.5 kg (131 lb 1.6 oz)   21 1121 57.1 kg (125 lb 14.1 oz)       Intake/Output Summary (Last 24 hours) at 2021 0957  Last data filed at 2021 1041  Gross per 24 hour   Intake 240 ml   Output --   Net 240 ml        PHYSICAL EXAM  Not done     Additional Comments:  ROS, FH, SH: See initial GI consult for details.     I have reviewed the patient's new clinical lab results:     Recent Labs   Lab Test 21  0850 21  0755 21  0410 21  1058 10/29/21  1029   WBC 13.3* 15.9* 16.0*  --   --    HGB 9.7* 10.0* 10.6*  --   --    MCV 93 93 89  --   --     378 381  --   --    INR  --   --  1.30* 1.20* 1.30*     Recent Labs   Lab Test 21  0850 21  0755 21  0410 21  1338   POTASSIUM 4.0 5.0 4.4 4.0   CHLORIDE 95 95 93* 92*   CO2  --  22 20 18*   BUN  --  29 39* 26   ANIONGAP  --  9 13 12     Recent Labs   Lab Test 21  1421 21  0755 21  0509 21  0410 21  1058 21  1338 10/26/21  0651 10/25/21  1339 10/25/21  1046 10/19/21  1654 10/01/21  0632 21  0359 21  1757 21  0513 21  2132 21  0804 21  0909   ALBUMIN 2.1* 2.1*  --  2.3* 2.1* 2.2*   < >  --  2.5*   < > 2.4*   < >  --    < > 2.0*   < > 2.8*   BILITOTAL  --  6.0*  --  6.8* 6.4* 7.0*   < >  --  5.8*   < > 4.0*   < >  --    < > 3.6*   < > 3.3*   ALT  --  40  --   43  --  37   < >  --  39  --  36   < >  --    < > 36   < > 32   AST  --  68*  --  69*  --  61*   < >  --  59*  --  52*   < >  --    < > 66*   < > 58*   PROTEIN  --   --  Negative  --   --   --   --  Negative  --   --   --   --  20 *   < >  --   --   --    LIPASE  --   --   --   --   --   --   --   --  248  --  517*  --   --   --   --   --  241   AMYLASE  --   --   --   --   --   --   --   --   --   --  90  --   --   --  68  --  66    < > = values in this interval not displayed.        Imaging:    US paracentesis - results pending.     Assessment/Plan  55 year old female with complicated past medical history including primary sclerosing cholangitis with associated liver cirrhosis complicated by ascites requiring frequent paracenesis, esophageal varices, s/p biliary stent placement, pancreatic mucinous cystadenoma, and ulcerative colitis, currently awaiting liver transplant through Dr. Leventhal at [facility], admitted 11/6 with symptoms of increased confusion and somnolence with some reports of vomiting and likely missing lactulose doses.     1. Hepatic encephalopathy. Suspect due to missed lactulose doses/vomiting prior to admission. Infectious workup pending given leukocytosis and thus far negative. Undergoing paracentesis now to rule out SBP. Ammonia 110 on admit, normalized. Mentation has been improving with stool output/lactulose. Getting cipro for SBP prophylaxis. Leukocytosis improving.   --Continue lactulose, titrate to goal 3 stools a day.  --continue cipro.   --Await cell ct/diff to rule out SBP.     2. Chronic biliary obstruction with stent 2/2 PSC. Gets frequent dilations through [facility], most recent 10/1. Has upcoming 11/22. Total bilirubin trending down along with LFTs.    3. Liver cirrhosis. Followed by [facility] liver transplant/hepatology. On ursodiol and diuretics. Gets weekly paracentesis. Was about to undergo liver transplant in August but developed episode of sustained V tach requiring cardioversion in OR, so  aborted. Reportedly negative cardiac workup since that time. Diuretics held due to dehydration.     Sharlene Burris, PAC  [facility] ([facility])

## 2021-11-08 NOTE — CONSULTS
"CLINICAL NUTRITION SERVICES  -  ASSESSMENT NOTE      MALNUTRITION:  % Weight Loss: Unable to determine --> masked by fluid  % Intake:  <75% for > 7 days (moderate malnutrition)  Subcutaneous Fat Loss:  Orbital region moderate depletion and Upper arm region moderate to svere depletion  Muscle Loss:  Temporal region moderate depletion, Clavicle bone region moderate to severe depletion and Acromion bone region moderate to severe depletion --> could not visualize LEs today  Fluid Retention: +Ascites --> masking true wt trends    Malnutrition Diagnosis: Severe malnutrition  In Context of:  Acute illness or injury with underlying chronic illness or disease        REASON FOR ASSESSMENT  Berta Nava is a 56 year old female seen by Registered Dietitian for Admission Nutrition Risk Screen for positive and Provider Order - \"malnutrition\" (refer to RD brief note 11/065 when boarding in ED).      PMH of: PSC, cirrhosis with ascites requiring frequent paracentesis, UC.    Admit 2/2: Hepatic encephalopathy, GONZALO    NUTRITION HISTORY  - Information obtained from patient and chart.  - GI notes indicate patient had N/V at home, therefore was unable to take Lactulose as prescribed, resulting in admit.  Also noted liver transplant in 8/2021 aborted in OR due to v tach.    - Per review of past RD notes when at [facility], had an extremely brief period of nasal tube reliance post-aborted transplant d/t decreased appetite/intakes.  FT access lost during procedure/EGD and not replaced prior to discharge.  Patient required a 2 gram Na diet and received Ensure.    - Diet at home: Regular reported.  - Usual intakes: [place] for meals TID though reports over the past few weeks she was not able to eat consistently or normally d/t decreased appetite and overall not feeling well.  She gets fully easily at baseline per report.  Suspect she chronically meets <75% needs at baseline based on NFPE and also confirmed by patient report over the past few " "weeks.  - Use of oral supplements: Takes Ensure when able.  - Allergies: NKFA.      CURRENT NUTRITION ORDERS  Diet Order:     Regular  Ensure BID between meals already ordered    Current Intake/Tolerance:  Limited timeframe, % intakes thus far.  Reports she is drinking 1 Ensure daily.  Chocolate Ensure was scheduled as 2 pm supplement and suspect was out of stock over the weekend, therefore didn't receive.  She wants to receive 2 Ensure daily and is ok with vanilla.        NUTRITION FOCUSED PHYSICAL ASSESSMENT FOR DIAGNOSING MALNUTRITION)  Yes     Obtained from Chart/Interdisciplinary Team:  - Paracentesis completed this AM.  - No documentation of PI  - Stooling patterns reviewed    ANTHROPOMETRICS  Height: 5' 5\"  Weight: 139 lbs 6.4 oz  Body mass index is 22.84 kg/m .  Weight Status:  Normal BMI  Weight History:  Wt Readings from Last 10 Encounters:   11/08/21 63.2 kg (139 lb 6.4 oz)   11/01/21 61.7 kg (136 lb)   10/27/21 58.1 kg (128 lb)   10/07/21 59.9 kg (132 lb)   10/01/21 62.6 kg (138 lb 0.1 oz)   09/27/21 63.8 kg (140 lb 11.2 oz)   09/20/21 63.4 kg (139 lb 12.8 oz)   09/16/21 60.3 kg (132 lb 14.4 oz)   09/09/21 59.4 kg (130 lb 14.4 oz)   08/16/21 62.6 kg (138 lb)     - 125-130# closer to dry wt?  Overall trends masked by fluid shifts.    LABS  Labs reviewed:  Electrolytes  Potassium (mmol/L)   Date Value   11/08/2021 4.0   11/07/2021 5.0   11/06/2021 4.4   06/25/2021 4.4   06/21/2021 4.4   06/12/2021 3.9     Phosphorus (mg/dL)   Date Value   09/09/2021 2.8   09/08/2021 3.2   09/07/2021 4.2   09/06/2021 4.3   09/05/2021 3.9   05/16/2018 2.6    Blood Glucose  Glucose (mg/dL)   Date Value   11/08/2021 123 (H)   11/07/2021 95   11/06/2021 133 (H)   11/01/2021 92   10/29/2021 101 (H)   06/25/2021 108 (H)   06/21/2021 102 (H)   06/12/2021 98   06/11/2021 146 (H)   06/10/2021 114 (H)     Hemoglobin A1C (%)   Date Value   04/29/2021 4.3    Inflammatory Markers  CRP Inflammation (mg/L)   Date Value   08/19/2021 " 40.0 (H)   06/08/2021 23.1 (H)   06/01/2021 16.1 (H)   05/25/2021 14.3 (H)     WBC (10e9/L)   Date Value   06/25/2021 15.2 (H)   06/21/2021 14.0 (H)   06/12/2021 21.3 (H)     WBC Count (10e3/uL)   Date Value   11/08/2021 13.3 (H)   11/07/2021 15.9 (H)   11/06/2021 16.0 (H)     Albumin (g/dL)   Date Value   11/08/2021 1.9 (L)   11/07/2021 2.1 (L)   11/07/2021 2.1 (L)   06/25/2021 2.8 (L)   06/21/2021 2.6 (L)   06/12/2021 2.5 (L)      Magnesium (mg/dL)   Date Value   10/25/2021 2.1   09/16/2021 2.1   09/09/2021 1.9   05/14/2018 2.7 (H)   05/13/2018 1.9     Sodium (mmol/L)   Date Value   11/08/2021 125 (L)   11/07/2021 126 (L)   11/06/2021 126 (L)   06/25/2021 128 (L)   06/21/2021 134   06/12/2021 134    Renal  Urea Nitrogen (mg/dL)   Date Value   11/08/2021 26   11/07/2021 29   11/06/2021 39 (H)   06/25/2021 23   06/21/2021 18   06/12/2021 22     Creatinine (mg/dL)   Date Value   11/08/2021 0.86   11/07/2021 0.92   11/06/2021 1.26 (H)   06/25/2021 0.69   06/21/2021 0.68   06/12/2021 0.56     Additional  Ketones Urine (mg/dL)   Date Value   11/06/2021 Negative   06/10/2021 Negative        B/P: 102/68, T: 98.6, P: 81, R: 18      MEDICATIONS  Medications reviewed:    ciprofloxacin  500 mg Oral Q24H     lactulose  20 g Oral Q6H     omeprazole  40 mg Oral BID     rifaximin  550 mg Oral BID     sodium chloride (PF)  3 mL Intracatheter Q8H     ursodiol  300 mg Oral BID        lactated ringers 75 mL/hr at 11/08/21 0858          ASSESSED NUTRITION NEEDS PER APPROVED PRACTICE GUIDELINES:    Dosing Weight 58 kg - dry wt?  Estimated Energy Needs: 30-35 Kcal/Kg  Justification: repletion  Estimated Protein Needs: 1.5-2 g pro/Kg  Justification: Repletion and preservation of lean body mass  Estimated Fluid Needs: per MD      NUTRITION DIAGNOSIS:  Malnutrition related to chronically decreased appetite/intake, early satiety contributing all in the setting of cirrhosis as evidenced by overt fat/muscle loss, fluid shifts masking true wt  trends.    NUTRITION INTERVENTIONS  Recommendations / Nutrition Prescription  Continue regular diet as ordered.  Do not recommend Na restriction while acutely admitted unless indicated by MD.  Small/frequent meals as needed and self-selection of high calorie/high protein as able.     Continue Ensure BID between meals - change to vanilla flavor.      Implementation  Nutrition education: Provided education on above.  Encouraged intakes and use of supplements.  Reviewed protein sources from food.    Medical Food Supplement: As above.     Nutrition Goals  Patient to consume at least 50-75% of meals or supplements TID while admitted.        MONITORING AND EVALUATION:  Progress towards goals will be monitored and evaluated per protocol and Practice Guidelines          Rocio Love RDN, LD  Clinical Dietitian  3rd floor/ICU: [phone]  All other floors: [phone]  Weekend/holiday: [phone]

## 2021-11-09 VITALS
DIASTOLIC BLOOD PRESSURE: 64 MMHG | OXYGEN SATURATION: 99 % | SYSTOLIC BLOOD PRESSURE: 113 MMHG | BODY MASS INDEX: 21.75 KG/M2 | TEMPERATURE: 97.4 F | RESPIRATION RATE: 18 BRPM | HEART RATE: 85 BPM | WEIGHT: 132.7 LBS

## 2021-11-09 LAB
ALBUMIN SERPL-MCNC: 2.1 G/DL (ref 3.4–5)
ALP SERPL-CCNC: 232 U/L (ref 40–150)
ALT SERPL W P-5'-P-CCNC: 32 U/L (ref 0–50)
ANION GAP SERPL CALCULATED.3IONS-SCNC: 10 MMOL/L (ref 3–14)
AST SERPL W P-5'-P-CCNC: 49 U/L (ref 0–45)
BASOPHILS # BLD AUTO: 0 10E3/UL (ref 0–0.2)
BASOPHILS NFR BLD AUTO: 0 %
BILIRUB SERPL-MCNC: 5.6 MG/DL (ref 0.2–1.3)
BUN SERPL-MCNC: 28 MG/DL (ref 7–30)
CALCIUM SERPL-MCNC: 9.2 MG/DL (ref 8.5–10.1)
CHLORIDE BLD-SCNC: 92 MMOL/L (ref 94–109)
CO2 SERPL-SCNC: 21 MMOL/L (ref 20–32)
CREAT SERPL-MCNC: 0.79 MG/DL (ref 0.52–1.04)
EOSINOPHIL # BLD AUTO: 0.1 10E3/UL (ref 0–0.7)
EOSINOPHIL NFR BLD AUTO: 1 %
ERYTHROCYTE [DISTWIDTH] IN BLOOD BY AUTOMATED COUNT: 14.8 % (ref 10–15)
GFR SERPL CREATININE-BSD FRML MDRD: 83 ML/MIN/1.73M2
GLUCOSE BLD-MCNC: 103 MG/DL (ref 70–99)
HCT VFR BLD AUTO: 28 % (ref 35–47)
HGB BLD-MCNC: 9 G/DL (ref 11.7–15.7)
IMM GRANULOCYTES # BLD: 0.2 10E3/UL
IMM GRANULOCYTES NFR BLD: 2 %
LYMPHOCYTES # BLD AUTO: 0.7 10E3/UL (ref 0.8–5.3)
LYMPHOCYTES NFR BLD AUTO: 5 %
MCH RBC QN AUTO: 29.6 PG (ref 26.5–33)
MCHC RBC AUTO-ENTMCNC: 32.1 G/DL (ref 31.5–36.5)
MCV RBC AUTO: 92 FL (ref 78–100)
MONOCYTES # BLD AUTO: 1.2 10E3/UL (ref 0–1.3)
MONOCYTES NFR BLD AUTO: 9 %
NEUTROPHILS # BLD AUTO: 10.9 10E3/UL (ref 1.6–8.3)
NEUTROPHILS NFR BLD AUTO: 83 %
NRBC # BLD AUTO: 0 10E3/UL
NRBC BLD AUTO-RTO: 0 /100
PLATELET # BLD AUTO: 300 10E3/UL (ref 150–450)
POTASSIUM BLD-SCNC: 4.1 MMOL/L (ref 3.4–5.3)
PROT SERPL-MCNC: 6.3 G/DL (ref 6.8–8.8)
RBC # BLD AUTO: 3.04 10E6/UL (ref 3.8–5.2)
SODIUM SERPL-SCNC: 123 MMOL/L (ref 133–144)
WBC # BLD AUTO: 13.1 10E3/UL (ref 4–11)

## 2021-11-09 PROCEDURE — 258N000003 HC RX IP 258 OP 636: Performed by: PHYSICIAN ASSISTANT

## 2021-11-09 PROCEDURE — 250N000013 HC RX MED GY IP 250 OP 250 PS 637: Performed by: INTERNAL MEDICINE

## 2021-11-09 PROCEDURE — 250N000013 HC RX MED GY IP 250 OP 250 PS 637: Performed by: PHYSICIAN ASSISTANT

## 2021-11-09 PROCEDURE — 85025 COMPLETE CBC W/AUTO DIFF WBC: CPT | Performed by: HOSPITALIST

## 2021-11-09 PROCEDURE — 99239 HOSP IP/OBS DSCHRG MGMT >30: CPT | Performed by: HOSPITALIST

## 2021-11-09 PROCEDURE — 80053 COMPREHEN METABOLIC PANEL: CPT | Performed by: PHYSICIAN ASSISTANT

## 2021-11-09 PROCEDURE — 36415 COLL VENOUS BLD VENIPUNCTURE: CPT | Performed by: PHYSICIAN ASSISTANT

## 2021-11-09 RX ADMIN — RIFAXIMIN 550 MG: 550 TABLET ORAL at 10:08

## 2021-11-09 RX ADMIN — OMEPRAZOLE 40 MG: 20 CAPSULE, DELAYED RELEASE ORAL at 10:08

## 2021-11-09 RX ADMIN — URSODIOL 300 MG: 300 CAPSULE ORAL at 10:07

## 2021-11-09 RX ADMIN — CIPROFLOXACIN 500 MG: 250 TABLET ORAL at 10:08

## 2021-11-09 RX ADMIN — SODIUM CHLORIDE, POTASSIUM CHLORIDE, SODIUM LACTATE AND CALCIUM CHLORIDE: 600; 310; 30; 20 INJECTION, SOLUTION INTRAVENOUS at 10:18

## 2021-11-09 ASSESSMENT — ACTIVITIES OF DAILY LIVING (ADL)
ADLS_ACUITY_SCORE: 8

## 2021-11-09 NOTE — PLAN OF CARE
A&Ox4, forgetful. VSS. SBA to bathroom. Voiding ok. Loose BM's. Overall denies pain. CMS intact. Skin tear to arm CDI with foam dressing. IVF. Tolerating diet. Would like to go home today.

## 2021-11-09 NOTE — PLAN OF CARE
Pt Ax1 with walker and gait belt. VSS BP - 98/56. 02 - 99% on RA. Lactulose held this shift, per patient request. Loose stools x2 this shift. Infusing LR at 75ml/hr.

## 2021-11-09 NOTE — PHARMACY-RX INSURANCE COVERAGE
Patient has insurance through [employer] ([employer]) from an employer.    Xifaxan: $0/mo.    -JERSON Syed, Pharmacy Technician/Liaison, Discharge Pharmacy [phone]

## 2021-11-09 NOTE — PROGRESS NOTES
GASTROENTEROLOGY PROGRESS NOTE     SUBJECTIVE:  No confusion. Had paracentesis yesterday (diuretics have been held this admit, typically gets weekly paracentesis). Reports 4 loose BMs since 1am. Lactulose held this am. No melena, hematochezia, or abdominal pain, nausea, vomiting.      OBJECTIVE:    /64 (BP Location: Left arm)   Pulse 85   Temp 97.4  F (36.3  C) (Temporal)   Resp 18   Wt 60.2 kg (132 lb 11.2 oz)   LMP  (LMP Unknown)   SpO2 99%   BMI 21.75 kg/m    Temp (24hrs), Av.2  F (36.8  C), Min:97.7  F (36.5  C), Max:98.6  F (37  C)    Patient Vitals for the past 72 hrs:   Weight   21 0645 60.2 kg (132 lb 11.2 oz)   21 0552 63.2 kg (139 lb 6.4 oz)   21 0102 59.5 kg (131 lb 1.6 oz)   21 1121 57.1 kg (125 lb 14.1 oz)       Intake/Output Summary (Last 24 hours) at 2021 0957  Last data filed at 2021 1041  Gross per 24 hour   Intake 240 ml   Output --   Net 240 ml        PHYSICAL EXAM  Not done     Additional Comments:  ROS, FH, SH: See initial GI consult for details.     I have reviewed the patient's new clinical lab results:     Recent Labs   Lab Test 21  0850 21  0755 21  0410 21  1058 10/29/21  1029   WBC 13.3* 15.9* 16.0*  --   --    HGB 9.7* 10.0* 10.6*  --   --    MCV 93 93 89  --   --     378 381  --   --    INR  --   --  1.30* 1.20* 1.30*     Recent Labs   Lab Test 21  0850 21  0755 21  0410   POTASSIUM 4.0 5.0 4.4   CHLORIDE 95 95 93*   CO2 19* 22 20   BUN 26 29 39*   ANIONGAP 11 9 13     Recent Labs   Lab Test 21  0850 21  1421 21  0755 21  0509 21  0410 10/26/21  0651 10/25/21  1339 10/25/21  1046 10/19/21  1654 10/01/21  0632 21  0359 21  1757 21  0513 21  2132 21  0804 21  0909   ALBUMIN 1.9* 2.1* 2.1*  --  2.3*   < >  --  2.5*   < > 2.4*   < >  --    < > 2.0*   < > 2.8*   BILITOTAL 6.2*  --  6.0*  --  6.8*   < >  --  5.8*   < > 4.0*   < >   --    < > 3.6*   < > 3.3*   ALT 35  --  40  --  43   < >  --  39  --  36   < >  --    < > 36   < > 32   AST 58*  --  68*  --  69*   < >  --  59*  --  52*   < >  --    < > 66*   < > 58*   PROTEIN  --   --   --  Negative  --   --  Negative  --   --   --   --  20 *   < >  --   --   --    LIPASE  --   --   --   --   --   --   --  248  --  517*  --   --   --   --   --  241   AMYLASE  --   --   --   --   --   --   --   --   --  90  --   --   --  68  --  66    < > = values in this interval not displayed.        Imaging:    US paracentesis - 5.1L removed, negative for SBP. Received 25% IV albumin 25g.      Assessment/Plan  55 year old female with complicated past medical history including primary sclerosing cholangitis with associated liver cirrhosis complicated by ascites requiring frequent paracenesis, esophageal varices, s/p biliary stent placement, pancreatic mucinous cystadenoma, and ulcerative colitis, currently awaiting liver transplant through Dr. Leventhal at [facility], admitted 11/6 with symptoms of increased confusion and somnolence with some reports of vomiting and likely missing lactulose doses.     1. Hepatic encephalopathy. Suspect due to missed lactulose doses/vomiting prior to admission. Infectious workup negative. No signs of SBP. Ammonia 110 on admit, normalized. Mentation has been improving with stool output/lactulose. Getting cipro for SBP prophylaxis. Leukocytosis improving.   --Continue lactulose, titrate to goal 3 stools a day.  --continue cipro.      2. Chronic biliary obstruction with stent 2/2 PSC. Gets frequent dilations through [facility], most recent 10/1. Has upcoming 11/22. Total bilirubin trending down along with LFTs.    3. Liver cirrhosis. Followed by [facility] liver transplant/hepatology. On ursodiol and diuretics. Gets weekly paracentesis. Was about to undergo liver transplant in August but developed episode of sustained V tach requiring cardioversion in OR, so aborted. Reportedly negative cardiac  workup since that time. Diuretics held due to dehydration.   --Defer resuming diuretics to primary team.   --Reports has hepatology follow up with [facility] on date of ERCP 11/22.     4. Ulcerative colitis. Had been on mesalamine but reports this was stopped a few weeks ago. Not clear on reasoning. Will defer to [facility] gastro. No signs of a flare at this time.    5. Dispo: Ok to discharge from GI standpoint. Will sign off, please call with questions.     NANCI Atwood  [facility] ([facility])

## 2021-11-09 NOTE — DISCHARGE SUMMARY
[facility]    Discharge Summary  Hospitalist    Date of Admission:  11/6/2021  Date of Discharge:  11/9/2021  Provider:  Nehemiah Carolina MD  Date of Service (when I last saw the patient): 11/09/21    Discharge Diagnoses   1. Acute hepatic encephalopathy with hyperammoniaemia   2. GONZALO secondary to volume loss due to recent vomiting  3. End-stage liver disease secondary to primary sclerosing cholangitis  4. Chronic leukocytosis  5. Anemia of chronic disease  6. History ulcerative colitis and microscopic colitis   7. Malnutrition Diagnosis: Severe malnutrition  In Context of:  Acute illness or injury with underlying chronic illness or disease     Other medical issues:  Past Medical History:   Diagnosis Date     Ascites      Biliary cirrhosis (H)      Cholangitis, sclerosing      Cirrhosis of liver with ascites (H) 3/3/2021     Hearing loss of left ear     wears a hearing aide     History of low potassium      Hyperlipidemia      Hypertension      SBP (spontaneous bacterial peritonitis) (H) 4/30/2021     Sjogren's syndrome (H)      Ulcerative colitis (H)      Ulcerative pancolitis (H)        History of Present Illness   Berta Nava is an 56 year old female who presented with acute clouding of mentation.  Please see the admission history and physical for full details.    Hospital Course   Berta Nava is a medically complex 56 year old female with a PMH significant for primary sclerosing cholangitis, cirrhosis complicated by ascites requiring paracentesis, pancreatic mucinous cystoadenoma, ulcerative colitis, microscopic colitis and hypertension who presents with altered mental status.  During the hospital course she had a good response to treatment with dysfunction of her glucose at higher dose, hydration, therapeutic paracentesis with removal of more than 5 L and infusion of albumin.  Apparently this decompensation was multifactorial with volume depletion from vomiting, skipping lactulose  doses, fluid shifting with large ascites but not infection, bleeding or any other stressor found.     #Hyperammoniaemia  #Hepatic encephalopathy  Presents with increased confusion and somnolence.  Reportedly has been bumping into things and more agitated at home but unable to confirm this with family members. Suspect that her symptoms were related to elevated ammonia but not to underlying infection at this time.  -Titrated lactulose to have at least 4 bowel movements daily Normal labs and clear mentation.  -No evidence of SBP.  -GI consult appreciate no evidence  -Paracentesis done, removal more than 5 L.  Albumin 25% ordered        #GONZALO suspect due to recent vomiting   -Resolved holding diuretics and giving her fluid resuscitation.  -Hold spironolactone and furosemide, resume on discharge  -Gentle IV fluids with LR at 75 mils per hour.  May discontinue and.  Saline lock tonight  -Monitor for signs of fluid overload     #End-stage liver disease secondary to primary sclerosing cholangitis  She has end-stage liver disease and has been awaiting liver transplant.  Was about to undergo liver transplant in August when she had episode of sustained V. tach requiring cardioversion in the OR so this was aborted.  Patient/family report negative cardiac work-up since.  Also, very recently was turned down for a potential liver as it was too large for her body size.  She follows with [facility] hepatology/transplant clinic.  She gets a weekly paracentesis on Tuesday usually large volume. She takes furosemide and spironolactone at baseline.  She is also on ciprofloxacin SBP prophylaxis and ursodiol.   -Holding furosemide and spironolactone due to dehydration  -Continue ursodiol  -Resume ciprofloxacin for SBP prophylaxis  -Nutrition consult  -Paracentesis requested.     #Chronic leukocytosis  Patient has chronically elevated white blood cell count in the 10-14 range, primarily neutrophils.  This has been present for months per lab review.   As above doubt any new infection at this time.     #Anemia of chronic disease  Hemoglobin 9-10 range which I suspect is secondary to her end-stage liver disease.  No sign of bleeding recently.     #Chronic mild hyponatremia  Sodium at baseline level which is secondary to her end-stage liver disease.  She is on diuretics that have been resumed and sodium appears stable.  -BMP      #History ulcerative colitis and microscopic colitis  Not on any specific therapy for      # Discharge Pain Plan:    - Patient currently has NO PAIN and is not being prescribed pain medications on discharge.      Significant Results and Procedures   See below     Pending Results      Unresulted Labs Ordered in the Past 30 Days of this Admission     Date and Time Order Name Status Description    11/7/2021  1:50 PM Ascites Fluid Aerobic Bacterial Culture Routine Preliminary     11/6/2021  6:14 AM Blood Culture Peripheral Blood Preliminary           Code Status   Full Code       Primary Care Physician   Tila Nevarez    GEN:  Alert, oriented x 3, appears comfortable, NAD.  HEENT:  Normocephalic/atraumatic, no scleral icterus, no nasal discharge, mouth moist.  CV:  Regular rate and rhythm, no murmur or JVD.  S1 + S2 noted, no S3 or S4.  LUNGS:  Clear to auscultation bilaterally without rales/rhonchi/wheezing/retractions.  Symmetric chest rise on inhalation noted.  ABD:  Prominent. Active bowel sounds, soft, non-tender/non-distended.  No rebound/guarding/rigidity.  EXT:  No edema or cyanosis.  No joint synovitis noted.  SKIN:  Dry to touch, no exanthems noted in the visualized areas.     Discharge Disposition   Discharged to home    Consultations This Hospital Stay   GASTROENTEROLOGY IP CONSULT  NUTRITION SERVICES ADULT IP CONSULT  CARE MANAGEMENT / SOCIAL WORK IP CONSULT    Time Spent on this Encounter   I, Nehemiah Carolina MD, personally saw the patient today and spent greater than 30 minutes discharging this patient.     Discharge  Orders     Discharge Medications   Current Discharge Medication List      CONTINUE these medications which have NOT CHANGED    Details   ciprofloxacin (CIPRO) 500 MG tablet Take 1 tablet (500 mg) by mouth every 24 hours  Qty: 90 tablet, Refills: 3    Associated Diagnoses: Cirrhosis of liver with ascites, unspecified hepatic cirrhosis type (H)      furosemide (LASIX) 20 MG tablet Take 2 tablets (40 mg) by mouth daily  Qty: 180 tablet, Refills: 3    Associated Diagnoses: Cirrhosis of liver with ascites, unspecified hepatic cirrhosis type (H); PSC (primary sclerosing cholangitis)      lactulose (CEPHULAC) 10 GM packet Take 2 packets (20 g) by mouth 2 times daily (Titrate up to 4 times daily if not having at least 2 bowel movements daily)  Qty: 240 packet, Refills: 1    Associated Diagnoses: Cirrhosis of liver with ascites, unspecified hepatic cirrhosis type (H)      multivitamin (CENTRUM SILVER) tablet Take 1 tablet by mouth daily      omeprazole (PRILOSEC) 20 MG DR capsule Take 2 capsules (40 mg) by mouth 2 times daily  Qty: 360 capsule, Refills: 0    Associated Diagnoses: Secondary esophageal varices without bleeding (H)      polyethylene glycol (MIRALAX) 17 g packet Take 17 g by mouth daily as needed for constipation      spironolactone (ALDACTONE) 50 MG tablet Take 2 tablets (100 mg) by mouth daily  Qty: 180 tablet, Refills: 3    Associated Diagnoses: Cirrhosis of liver with ascites, unspecified hepatic cirrhosis type (H); PSC (primary sclerosing cholangitis)      ursodiol (ACTIGALL) 300 MG capsule Take 1 capsule (300 mg) by mouth 2 times daily  Qty: 180 capsule, Refills: 1    Associated Diagnoses: PSC (primary sclerosing cholangitis); Cirrhosis of liver with ascites, unspecified hepatic cirrhosis type (H)      ondansetron (ZOFRAN-ODT) 4 MG ODT tab Take 1 tablet (4 mg) by mouth every 8 hours as needed for nausea  Qty: 20 tablet, Refills: 1    Associated Diagnoses: Primary sclerosing cholangitis           Allergies    Allergies   Allergen Reactions     Diagnostic X-Ray Materials Hives     PATIENT HAD HIVE REACTION AFTER ADMINISTERING CT CONTRAST DYE.       Contrast Dye      Data   Most Recent 3 CBC's:Recent Labs   Lab Test 11/09/21  1014 11/08/21  0850 11/07/21  0755   WBC 13.1* 13.3* 15.9*   HGB 9.0* 9.7* 10.0*   MCV 92 93 93    340 378      Most Recent 3 BMP's:  Recent Labs   Lab Test 11/09/21  1014 11/08/21  0850 11/07/21  0755   * 125* 126*   POTASSIUM 4.1 4.0 5.0   CHLORIDE 92* 95 95   CO2 21 19* 22   BUN 28 26 29   CR 0.79 0.86 0.92   ANIONGAP 10 11 9   MARIELENA 9.2 9.1 9.3   * 123* 95     Most Recent 2 LFT's:  Recent Labs   Lab Test 11/09/21  1014 11/08/21  0850   AST 49* 58*   ALT 32 35   ALKPHOS 232* 278*   BILITOTAL 5.6* 6.2*     Most Recent INR's and Anticoagulation Dosing History:  Anticoagulation Dose History     Recent Dosing and Labs Latest Ref Rng & Units 10/1/2021 10/16/2021 10/19/2021 10/26/2021 10/29/2021 11/4/2021 11/6/2021    INR 0.85 - 1.15 1.32(H) 1.27(H) 1.24(H) 1.26(H) 1.30(H) 1.20(H) 1.30(H)        Most Recent 3 Troponin's:  Recent Labs   Lab Test 08/20/21  1533 08/20/21  0849 08/20/21  0418   TROPONIN 0.242* 0.311* 0.325*     Most Recent Cholesterol Panel:No lab results found.  Most Recent 6 Bacteria Isolates From Any Culture (See EPIC Reports for Culture Details):  Recent Labs   Lab Test 06/10/21  1140 06/10/21  0632 06/10/21  0631 06/09/21  1613 06/09/21  1556 05/07/21  1350   CULT No growth Canceled, Test credited  Duplicate request  Notification of test cancellation was given to  Christine Torres RN from . 6/10/21 at 0812.TV.   No growth No growth No growth No anaerobes isolated  No growth     Most Recent TSH, T4 and A1c Labs:  Recent Labs   Lab Test 04/29/21  1840   A1C 4.3     Results for orders placed or performed during the hospital encounter of 11/06/21   XR Chest 2 Views    Narrative    EXAM: XR CHEST 2 VIEW  LOCATION: [facility]  DATE/TIME:  11/06/2021, 9:28 AM    INDICATION: Encephalopathy, rule out pneumonia.  COMPARISON: 10/25/2021.      Impression    IMPRESSION: Negative chest.     US Paracentesis    Narrative    ULTRASOUND GUIDED PARACENTESIS   11/8/2021 12:05 PM     HISTORY:  Ascites    PROCEDURE:   Informed consent was obtained from the patient prior to  the procedure with discussion including the possible risks of  bleeding, infection and organ injury . Using 5 mL of 1% lidocaine for  local anesthesia, sterile technique, and sonographic guidance with  permanent image documentation, I placed an 8F pigtail catheter into  the peritoneal fluid collection. This was used to aspirate 5150 mL of  yellow, serous fluid in vacuum bottles, and some of this was sent for  any laboratory studies that had been ordered. There were no immediate  complications.       Impression    IMPRESSION:  Ultrasound guided paracentesis.    MARIAM BOYER MD         SYSTEM ID:  [id number]     Disclaimer: This note consists of symbols derived from keyboarding, dictation and/or voice recognition software. As a result, there may be errors in the script that have gone undetected. Please consider this when interpreting information found in this chart.

## 2021-11-10 ENCOUNTER — PATIENT OUTREACH (OUTPATIENT)
Dept: NURSING | Facility: CLINIC | Age: 56
End: 2021-11-10
Payer: COMMERCIAL

## 2021-11-10 NOTE — LETTER
[facility] CARE COORDINATION  [facility]  November 10, 2021    Berta Nava  [address] [facility] [address]      Dear Berta,    I am a clinic community health worker who works with Tila Nevarez NP at the [facility]. I wanted to introduce myself and provide you with my contact information for you to be able to call me with any questions or concerns. Below is a description of clinic care coordination and how I can further assist you.      The clinic care coordination team is made up of a registered nurse,  and community health worker who understand the health care system. The goal of clinic care coordination is to help you manage your health and improve access to the health care system in the most efficient manner. The team can assist you in meeting your health care goals by providing education, coordinating services, strengthening the communication among your providers and supporting you with any resource needs.    Please feel free to contact the Community Health Worker at [phone] with any questions or concerns. We are focused on providing you with the highest-quality healthcare experience possible and that all starts with you.     Sincerely,     KT Fung. Public Health  Community Health Worker  Clinic Care Coordination  [phone]

## 2021-11-10 NOTE — PROGRESS NOTES
Clinic Care Coordination Contact  [facility]: Post-Discharge Note  SITUATION                                                      Admission:    Admission Date: 11/06/21   Reason for Admission: Acute hepatic encephalopathy with hyperammonemia  Discharge:   Discharge Date: 11/09/21  Discharge Diagnosis: Acute hepatic encephalopathy with hyperammonemia    BACKGROUND                                                      Berta Nava is a medically complex 56 year old female with a PMH significant for primary sclerosing cholangitis, cirrhosis complicated by ascites requiring paracentesis, pancreatic mucinous cystoadenoma, ulcerative colitis, microscopic colitis and hypertension who presents with altered mental status.     Patient was discussed with Dr. Novak, who was provider in ED. Chart review of ED work up was reviewed as well as chart review of Care Everywhere, previous visits and admissions.     ASSESSMENT      Enrollment  Primary Care Care Coordination Status: Declined    Discharge Assessment  How are your symptoms? (Red Flag symptoms escalate to triage hotline per guidelines): Improved  Do you feel your condition is stable enough to be safe at home until your provider visit?: Yes  Does the patient have their discharge instructions? : Yes  Does the patient have questions regarding their discharge instructions? : No  Were you started on any new medications or were there changes to any of your previous medications? : No  Does the patient have all of their medications?: Yes (Patient was not started on any new medications)  Do you have questions regarding any of your medications? : No  Do you have all of your needed medical supplies or equipment (DME)?  (i.e. oxygen tank, CPAP, cane, etc.): Yes  Discharge follow-up appointment scheduled within 14 calendar days? : Yes (Patient has a check up scheduled with their transplant team)  Discharge Follow Up Appointment Scheduled with?: Specialty Care Provider  Is  patient agreeable to assistance with scheduling? : No (already has an appointment with their transplant team)              Care Management   Community Health Worker Initial Outreach    Patient accepts CC: No, Patient feels well supported by their transplant team. Patient will be sent Care Coordination introduction letter for future reference.     11-10    CHW was able to get into contact with the patient    The patient is resting since their hospital discharge    CHW asked the patient if they need assistance scheduling their pcp follow-up.    Patient stated that they have talked with their transplant team, and that during their speciality follow-up they will be sending a note to Dr. Nevarez with updates.    CHW introduced care coordination, however the patient declined. They feel well supported by their transplant team at this time.     CHW encouraged the patient to reach back out if they would like our assistance in the future.     PLAN                                                      Outpatient Plan:  N/A    Future Appointments   Date Time Provider Department Center   11/11/2021  2:45 PM [facility]   11/16/2021  3:00 PM [facility]   11/19/2021  9:00 AM RH COVID LAB [facility]   11/22/2021  9:00 AM UC LAB U[facility]R [facility]   11/22/2021 10:00 AM Leventhal, Thomas Michael, MD [facility]   11/23/2021  3:00 PM [facility]1 [facility]         For any urgent concerns, please contact our 24 hour nurse triage line: [phone] ([phone])         KT Fung. Public [facility]  Community Health Worker  Clinic Care Coordination  [phone]

## 2021-11-11 ENCOUNTER — LAB (OUTPATIENT)
Dept: LAB | Facility: CLINIC | Age: 56
End: 2021-11-11
Payer: COMMERCIAL

## 2021-11-11 DIAGNOSIS — K74.60 CIRRHOSIS OF LIVER (H): ICD-10-CM

## 2021-11-11 LAB
BACTERIA BLD CULT: NO GROWTH
INR PPP: 1.2 (ref 0.85–1.15)

## 2021-11-11 PROCEDURE — 36415 COLL VENOUS BLD VENIPUNCTURE: CPT

## 2021-11-11 PROCEDURE — 82040 ASSAY OF SERUM ALBUMIN: CPT

## 2021-11-11 PROCEDURE — 82247 BILIRUBIN TOTAL: CPT

## 2021-11-11 PROCEDURE — 84295 ASSAY OF SERUM SODIUM: CPT

## 2021-11-11 PROCEDURE — 85610 PROTHROMBIN TIME: CPT

## 2021-11-11 PROCEDURE — 82565 ASSAY OF CREATININE: CPT

## 2021-11-12 LAB
ALBUMIN SERPL-MCNC: 2 G/DL (ref 3.4–5)
BILIRUB SERPL-MCNC: 6.6 MG/DL (ref 0.2–1.3)
CREAT SERPL-MCNC: 0.9 MG/DL (ref 0.52–1.04)
GFR SERPL CREATININE-BSD FRML MDRD: 71 ML/MIN/1.73M2
SODIUM SERPL-SCNC: 122 MMOL/L (ref 133–144)

## 2021-11-13 LAB — BACTERIA FLD CULT: NO GROWTH

## 2021-11-15 ENCOUNTER — HOSPITAL ENCOUNTER (OUTPATIENT)
Dept: ULTRASOUND IMAGING | Facility: CLINIC | Age: 56
Discharge: HOME | End: 2021-11-15
Attending: INTERNAL MEDICINE | Admitting: INTERNAL MEDICINE
Payer: COMMERCIAL

## 2021-11-15 ENCOUNTER — VIRTUAL VISIT (OUTPATIENT)
Dept: PHARMACY | Facility: CLINIC | Age: 56
End: 2021-11-15
Attending: EMERGENCY MEDICINE
Payer: COMMERCIAL

## 2021-11-15 VITALS
SYSTOLIC BLOOD PRESSURE: 118 MMHG | OXYGEN SATURATION: 100 % | DIASTOLIC BLOOD PRESSURE: 70 MMHG | RESPIRATION RATE: 20 BRPM | HEART RATE: 72 BPM

## 2021-11-15 DIAGNOSIS — R18.8 CIRRHOSIS OF LIVER WITH ASCITES, UNSPECIFIED HEPATIC CIRRHOSIS TYPE (H): ICD-10-CM

## 2021-11-15 DIAGNOSIS — Z71.85 VACCINE COUNSELING: ICD-10-CM

## 2021-11-15 DIAGNOSIS — K74.60 CIRRHOSIS OF LIVER WITH ASCITES, UNSPECIFIED HEPATIC CIRRHOSIS TYPE (H): ICD-10-CM

## 2021-11-15 DIAGNOSIS — K83.01 PSC (PRIMARY SCLEROSING CHOLANGITIS) (H): ICD-10-CM

## 2021-11-15 DIAGNOSIS — G93.40 ENCEPHALOPATHY: ICD-10-CM

## 2021-11-15 DIAGNOSIS — Z78.9 TAKES DIETARY SUPPLEMENTS: ICD-10-CM

## 2021-11-15 DIAGNOSIS — I85.10 SECONDARY ESOPHAGEAL VARICES WITHOUT BLEEDING (H): Primary | ICD-10-CM

## 2021-11-15 PROCEDURE — 250N000011 HC RX IP 250 OP 636: Performed by: RADIOLOGY

## 2021-11-15 PROCEDURE — 49083 ABD PARACENTESIS W/IMAGING: CPT

## 2021-11-15 PROCEDURE — P9047 ALBUMIN (HUMAN), 25%, 50ML: HCPCS | Performed by: RADIOLOGY

## 2021-11-15 PROCEDURE — 99605 MTMS BY PHARM NP 15 MIN: CPT | Performed by: PHARMACIST

## 2021-11-15 PROCEDURE — 99607 MTMS BY PHARM ADDL 15 MIN: CPT | Performed by: PHARMACIST

## 2021-11-15 PROCEDURE — 250N000009 HC RX 250: Performed by: RADIOLOGY

## 2021-11-15 RX ORDER — SUCRALFATE 1 G/1
1 TABLET ORAL 4 TIMES DAILY
Status: ON HOLD | COMMUNITY
Start: 2021-10-01 | End: 2021-11-27

## 2021-11-15 RX ORDER — SPIRONOLACTONE 50 MG/1
TABLET, FILM COATED ORAL
Qty: 180 TABLET | Refills: 3 | Status: ON HOLD | COMMUNITY
Start: 2021-11-15 | End: 2021-11-27

## 2021-11-15 RX ORDER — ALBUMIN (HUMAN) 12.5 G/50ML
25 SOLUTION INTRAVENOUS ONCE
Status: COMPLETED | OUTPATIENT
Start: 2021-11-15 | End: 2021-11-15

## 2021-11-15 RX ORDER — FUROSEMIDE 20 MG
TABLET ORAL
Qty: 180 TABLET | Refills: 3 | Status: ON HOLD | COMMUNITY
Start: 2021-11-15 | End: 2021-11-27

## 2021-11-15 RX ADMIN — ALBUMIN HUMAN 25 G: 0.25 SOLUTION INTRAVENOUS at 11:51

## 2021-11-15 RX ADMIN — LIDOCAINE HYDROCHLORIDE 10 ML: 10 INJECTION, SOLUTION EPIDURAL; INFILTRATION; INTRACAUDAL; PERINEURAL at 11:28

## 2021-11-15 NOTE — PROGRESS NOTES
Patient tolerated paracentesis well. Skin, warm and dry. 6000 mls of fabiola fluid removed from right side. Albumin 25g given Insertion site clean and dry, Band-Aid dressing applied with glue, no drainage or bleeding noted. Vital signs stable. Discharge instructions reviewed with patient. States she understands and has no questions at this time. Discharged ambulatory per self in stable condition.

## 2021-11-15 NOTE — PROCEDURES
[facility]    Procedure: Paracentesis.     Date/Time: 11/15/2021 5:07 PM  Performed by: Darling Solorio DO  Authorized by: Darling Solorio DO     UNIVERSAL PROTOCOL   Site Marked: Yes  Prior Images Obtained and Reviewed:  Yes  Required items: Required blood products, implants, devices and special equipment available    Patient identity confirmed:  Verbally with patient, arm band, provided demographic data and hospital-assigned identification number  Patient was reevaluated immediately before administering moderate or deep sedation or anesthesia  Confirmation Checklist:  Patient's identity using two indicators, relevant allergies, procedure was appropriate and matched the consent or emergent situation and correct equipment/implants were available  Time out: Immediately prior to the procedure a time out was called    Universal Protocol: the Joint Commission Universal Protocol was followed    Preparation: Patient was prepped and draped in usual sterile fashion           ANESTHESIA    Anesthesia: Local infiltration  Local Anesthetic:  Lidocaine 1% without epinephrine      SEDATION    Patient Sedated: No    See dictated procedure note for full details.  Findings: Paracentesis.     Specimens: none    Complications: None    Condition: Stable    PROCEDURE   Patient Tolerance:  Patient tolerated the procedure well with no immediate complications    Length of time physician/provider present for 1:1 monitoring during sedation: 0

## 2021-11-15 NOTE — PATIENT INSTRUCTIONS
Recommendations from today's MTM visit:                                                       1. Increase the number of doses you are taking of your sucralfate 1g. You should take this medication at least 2 hours before or after your other medications and at least 1 hour before or after eating food.     We discussed taking this medication at 10 am, 4 pm, and 10 pm so you have at least 3 daily doses. The medication is prescribed to take 4 doses daily so if you wake up in the middle of the night, you can take another dose.   Remember to take your evening medications at 8 pm so you can take your 10 pm dose of sucralfate.     Follow-up: Return if symptoms worsen or fail to improve.    It was great to speak with you today.  I value your experience and would be very thankful for your time with providing feedback on our clinic survey. You may receive a survey via email or text message in the next few days.     To schedule another MTM appointment, please call the clinic directly or you may call the MTM scheduling line at [phone] or toll-free at [phone].     My Clinical Pharmacist's contact information:                                                      Please feel free to contact me with any questions or concerns you have.      Alice HarrisD  Medication Therapy Management Resident  Pager: [phone]    Alice Puga D  [phone] (phone)  Medication Therapy Management Pharmacist

## 2021-11-15 NOTE — PROGRESS NOTES
Medication Therapy Management (MTM) Encounter    ASSESSMENT:                            Medication Adherence/Access: No issues identified    Hepatic encephalopathy/End-stage liver disease/PSC: Current medications are effectively managing symptoms. Would benefit from continued follow up with gastroenterology and transplant.    Esophageal varices: Patient is taking sucralfate once daily which could reduce possible benefit from this medication. Would benefit from increasing frequency to prescribed four times daily for maximum benefit.    Supplements: Stable.    Vaccines: Per ACIP guidelines, patient eligible for covid vaccine booster, influenza, and 2nd dose Shingrix when cleared by transplant team.      PLAN:                            1. Take sucralfate at 10 am, 4 pm, and 10 pm so you have at least 3 daily doses. The medication is prescribed to take 4 doses daily so if you wake up in the middle of the night, you can take another dose.     Follow-up: Return if symptoms worsen or fail to improve.      SUBJECTIVE/OBJECTIVE:                          Berta Nava is a 56 year old female called for a transitions of care visit. She was discharged from [facility] on 11/9/21 for hepatic encephalopathy with hyperammoniaemia.      Reason for visit: Transitions of Care    Allergies/ADRs: Reviewed in chart  Past Medical History: Reviewed in chart  Tobacco: She reports that she has never smoked. She has never used smokeless tobacco.  Alcohol: not currently using    Medication Adherence/Access: Patient takes medications directly from bottles and uses pill box(es).  Per patient, misses medication 0-1 times per week.   Takes medications at 8 am, in the afternoon around 2 pm, and 10 pm  The patient fills medications at [facility]: NO, fills medications at [facility].    Per 11/9/21 inpatient note - decompensation was multifactorial with volume depletion from vomiting, skipping lactulose doses, fluid shifting with large ascites but not  infection, bleeding or any other stressor found.    Hepatic encephalopathy/End-stage liver disease/PSC:  Current medications:  Ciprofloxacin 500 mg daily  Furosemide 40 mg every AM and 20 mg every afternoon  Lactulose 20 g powder twice daily (can titrate up to four times daily if not having at least 2 bowel movements daily) - currently taking three times daily  Ondansetron 4 mg ODT three times daily as needed - Not using often, approximately 1-2 times per week  Spironolactone 100 mg every AM and 50 mg every afternoon  Ursodiol 300 mg twice daily     Pt reports she weighs herself daily to monitor fluid accumulation and has a plan to call Dr. Leventhal if weight gain increases. Pt feels diuretics are working to decrease fluid buildup. Having 2-3 bowel movements daily. No concerns or side effects reported.    Last Comprehensive Metabolic Panel:  Sodium   Date Value Ref Range Status   11/11/2021 122 (L) 133 - 144 mmol/L Final   06/25/2021 128 (L) 133 - 144 mmol/L Final     Potassium   Date Value Ref Range Status   11/09/2021 4.1 3.4 - 5.3 mmol/L Final   06/25/2021 4.4 3.4 - 5.3 mmol/L Final     Chloride   Date Value Ref Range Status   11/09/2021 92 (L) 94 - 109 mmol/L Final   06/25/2021 100 94 - 109 mmol/L Final     Carbon Dioxide   Date Value Ref Range Status   06/25/2021 21 20 - 32 mmol/L Final     Carbon Dioxide (CO2)   Date Value Ref Range Status   11/09/2021 21 20 - 32 mmol/L Final     Anion Gap   Date Value Ref Range Status   11/09/2021 10 3 - 14 mmol/L Final   06/25/2021 7 3 - 14 mmol/L Final     Glucose   Date Value Ref Range Status   11/09/2021 103 (H) 70 - 99 mg/dL Final   06/25/2021 108 (H) 70 - 99 mg/dL Final     Urea Nitrogen   Date Value Ref Range Status   11/09/2021 28 7 - 30 mg/dL Final   06/25/2021 23 7 - 30 mg/dL Final     Creatinine   Date Value Ref Range Status   11/11/2021 0.90 0.52 - 1.04 mg/dL Final   06/25/2021 0.69 0.52 - 1.04 mg/dL Final     GFR Estimate   Date Value Ref Range Status    11/11/2021 71 >60 mL/min/1.73m2 Final     Comment:     As of July 11, 2021, eGFR is calculated by the CKD-EPI creatinine equation, without race adjustment. eGFR can be influenced by muscle mass, exercise, and diet. The reported eGFR is an estimation only and is only applicable if the renal function is stable.   06/25/2021 >90 >60 mL/min/[1.73_m2] Final     Comment:     Non  GFR Calc  Starting 12/18/2018, serum creatinine based estimated GFR (eGFR) will be   calculated using the Chronic Kidney Disease Epidemiology Collaboration   (CKD-EPI) equation.       Calcium   Date Value Ref Range Status   11/09/2021 9.2 8.5 - 10.1 mg/dL Final   06/25/2021 9.4 8.5 - 10.1 mg/dL Final     Bilirubin Total   Date Value Ref Range Status   11/11/2021 6.6 (H) 0.2 - 1.3 mg/dL Final   06/25/2021 3.3 (H) 0.2 - 1.3 mg/dL Final     Alkaline Phosphatase   Date Value Ref Range Status   11/09/2021 232 (H) 40 - 150 U/L Final   06/25/2021 386 (H) 40 - 150 U/L Final     ALT   Date Value Ref Range Status   11/09/2021 32 0 - 50 U/L Final   06/25/2021 32 0 - 50 U/L Final     AST   Date Value Ref Range Status   11/09/2021 49 (H) 0 - 45 U/L Final   06/25/2021 58 (H) 0 - 45 U/L Final     Wt Readings from Last 4 Encounters:   11/09/21 132 lb 11.2 oz (60.2 kg)   11/01/21 136 lb (61.7 kg)   10/27/21 128 lb (58.1 kg)   10/07/21 132 lb (59.9 kg)     Esophageal varices:  Current medications:  Omeprazole 40 mg twice daily  Sucralfate 1g once daily (prescribed four times daily)  Pt reports she was told sucralfate interacts with food and other medications so she has been taking the medication once a day in the middle of the night if she wakes up. Reports she does not set an alarm so timing of doses varies and certain nights she does not wake up so does not take any sucralfate doses. No chest burning, acid taste in mouth, or other side effects reported.     10/1 Gastroenterology varices banding and ERCP procedure note:  - Observe patient in  PACU for possible discharge same day.   - Avoid aspirin and nonsteroidal anti-inflammatory medicines for 3 days.   - Full liquid diet today. Soft diet tomorrow and then advance as tolerated.   - Carafate 1 g QID, PO PPI BID for 8 weeks   - Observe patient's clinical course and repeat ERCP in 6 weeks with Dr. Gabriel assuming patient has not been transplanted by then. Earlier if signs of recurrent cholangitis or rising LFTs.   - Will also repeat EGD at that time for variceal surveillance.     Supplements:  Current medications: Multivitamin daily. No concerns.    Vaccines: Due for covid booster, influenza, 2nd dose Shingrix. Pt reports she has been told not to receive any vaccines until she is cleared by transplant team.     Today's Vitals: LMP  (LMP Unknown)   ----------------  Post Discharge Medication Reconciliation Status: discharge medications reconciled, continue medications without change.    I spent 34 minutes with this patient today. All changes were made via collaborative practice agreement with Tila Nevarez. A copy of the visit note was provided to the patient's primary care provider.    The patient was sent via [employer] a summary of these recommendations.     Ms. Nava was seen independently by Dr. Gamboa. I have reviewed and agree with the resident note and plan of care.  Marlene Barlow, PharmD    Blanca Gamboa, PharmD  Medication Therapy Management Resident  Pager: [phone]    Telemedicine Visit Details  Type of service:  Telephone visit  Start Time: 3:02 PM  End Time: 3:36 PM  Originating Location (patient location): Home  Distant Location (provider location):  [facility]     Medication Therapy Recommendations  Secondary esophageal varices without bleeding (H)    Current Medication: sucralfate (CARAFATE) 1 GM tablet   Rationale: Does not understand instructions - Adherence - Adherence   Recommendation: Provide Education   Status: Patient Agreed -  Adherence/Education         Vaccine counseling    Rationale: Preventive therapy - Needs additional medication therapy - Indication   Recommendation: Start Medication - Shingrix 50 MCG/0.5ML Susr   Status: Declined per Patient

## 2021-11-17 ENCOUNTER — APPOINTMENT (OUTPATIENT)
Dept: GENERAL RADIOLOGY | Facility: CLINIC | Age: 56
DRG: 005 | End: 2021-11-17
Attending: NURSE PRACTITIONER
Payer: COMMERCIAL

## 2021-11-17 ENCOUNTER — ANCILLARY PROCEDURE (OUTPATIENT)
Dept: ULTRASOUND IMAGING | Facility: CLINIC | Age: 56
End: 2021-11-17
Attending: STUDENT IN AN ORGANIZED HEALTH CARE EDUCATION/TRAINING PROGRAM
Payer: COMMERCIAL

## 2021-11-17 ENCOUNTER — HOSPITAL ENCOUNTER (INPATIENT)
Facility: CLINIC | Age: 56
LOS: 10 days | Discharge: HOME HEALTH | DRG: 005 | End: 2021-11-27
Attending: EMERGENCY MEDICINE | Admitting: TRANSPLANT SURGERY
Payer: COMMERCIAL

## 2021-11-17 ENCOUNTER — APPOINTMENT (OUTPATIENT)
Dept: CT IMAGING | Facility: CLINIC | Age: 56
DRG: 005 | End: 2021-11-17
Attending: EMERGENCY MEDICINE
Payer: COMMERCIAL

## 2021-11-17 ENCOUNTER — ORGAN (OUTPATIENT)
Dept: TRANSPLANT | Facility: CLINIC | Age: 56
End: 2021-11-17

## 2021-11-17 ENCOUNTER — ANESTHESIA EVENT (OUTPATIENT)
Dept: SURGERY | Facility: CLINIC | Age: 56
DRG: 005 | End: 2021-11-17
Payer: COMMERCIAL

## 2021-11-17 DIAGNOSIS — K83.01 PSC (PRIMARY SCLEROSING CHOLANGITIS) (H): ICD-10-CM

## 2021-11-17 DIAGNOSIS — K74.60 CIRRHOSIS OF LIVER WITH ASCITES, UNSPECIFIED HEPATIC CIRRHOSIS TYPE (H): Primary | ICD-10-CM

## 2021-11-17 DIAGNOSIS — K74.60 CIRRHOSIS OF LIVER WITH ASCITES, UNSPECIFIED HEPATIC CIRRHOSIS TYPE (H): ICD-10-CM

## 2021-11-17 DIAGNOSIS — Z11.52 ENCOUNTER FOR SCREENING LABORATORY TESTING FOR SEVERE ACUTE RESPIRATORY SYNDROME CORONAVIRUS 2 (SARS-COV-2): ICD-10-CM

## 2021-11-17 DIAGNOSIS — Z76.82 LIVER TRANSPLANT PLANNED: ICD-10-CM

## 2021-11-17 DIAGNOSIS — K72.10 CHRONIC LIVER FAILURE WITHOUT HEPATIC COMA (H): ICD-10-CM

## 2021-11-17 DIAGNOSIS — Z94.4 STATUS POST LIVER TRANSPLANTATION (H): Primary | ICD-10-CM

## 2021-11-17 DIAGNOSIS — K76.82 HEPATIC ENCEPHALOPATHY (H): ICD-10-CM

## 2021-11-17 DIAGNOSIS — G93.40 ENCEPHALOPATHY: ICD-10-CM

## 2021-11-17 DIAGNOSIS — I85.10 SECONDARY ESOPHAGEAL VARICES WITHOUT BLEEDING (H): ICD-10-CM

## 2021-11-17 DIAGNOSIS — R18.8 CIRRHOSIS OF LIVER WITH ASCITES, UNSPECIFIED HEPATIC CIRRHOSIS TYPE (H): Primary | ICD-10-CM

## 2021-11-17 DIAGNOSIS — R18.8 CIRRHOSIS OF LIVER WITH ASCITES, UNSPECIFIED HEPATIC CIRRHOSIS TYPE (H): ICD-10-CM

## 2021-11-17 LAB
ABO/RH(D): NORMAL
ALBUMIN SERPL-MCNC: 2.5 G/DL (ref 3.4–5)
ALP SERPL-CCNC: 303 U/L (ref 40–150)
ALT SERPL W P-5'-P-CCNC: 37 U/L (ref 0–50)
AMMONIA PLAS-SCNC: 78 UMOL/L (ref 10–50)
AMYLASE SERPL-CCNC: 81 U/L (ref 30–110)
ANION GAP SERPL CALCULATED.3IONS-SCNC: 12 MMOL/L (ref 3–14)
ANTIBODY SCREEN: NEGATIVE
APPEARANCE FLD: CLEAR
APTT PPP: 32 SECONDS (ref 22–38)
AST SERPL W P-5'-P-CCNC: 59 U/L (ref 0–45)
ATRIAL RATE - MUSE: 71 BPM
BASOPHILS # BLD AUTO: 0.1 10E3/UL (ref 0–0.2)
BASOPHILS NFR BLD AUTO: 0 %
BILIRUB SERPL-MCNC: 6.6 MG/DL (ref 0.2–1.3)
BUN SERPL-MCNC: 27 MG/DL (ref 7–30)
CALCIUM SERPL-MCNC: 9.8 MG/DL (ref 8.5–10.1)
CHLORIDE BLD-SCNC: 95 MMOL/L (ref 94–109)
CO2 SERPL-SCNC: 19 MMOL/L (ref 20–32)
COLOR FLD: YELLOW
CREAT SERPL-MCNC: 0.92 MG/DL (ref 0.52–1.04)
CRP SERPL-MCNC: 54 MG/L (ref 0–8)
DIASTOLIC BLOOD PRESSURE - MUSE: NORMAL MMHG
EOSINOPHIL # BLD AUTO: 0 10E3/UL (ref 0–0.7)
EOSINOPHIL NFR BLD AUTO: 0 %
ERYTHROCYTE [DISTWIDTH] IN BLOOD BY AUTOMATED COUNT: 15.3 % (ref 10–15)
FIBRINOGEN PPP-MCNC: 661 MG/DL (ref 170–490)
GFR SERPL CREATININE-BSD FRML MDRD: 69 ML/MIN/1.73M2
GLUCOSE BLD-MCNC: 103 MG/DL (ref 70–99)
GRAM STAIN RESULT: NORMAL
GRAM STAIN RESULT: NORMAL
HBV CORE AB SERPL QL IA: NONREACTIVE
HBV SURFACE AB SERPL IA-ACNC: 0 M[IU]/ML
HBV SURFACE AG SERPL QL IA: NONREACTIVE
HCT VFR BLD AUTO: 31.5 % (ref 35–47)
HCV AB SERPL QL IA: NONREACTIVE
HGB BLD-MCNC: 10.8 G/DL (ref 11.7–15.7)
HIV 1+2 AB+HIV1 P24 AG SERPL QL IA: NONREACTIVE
HOLD SPECIMEN: NORMAL
IMM GRANULOCYTES # BLD: 0.2 10E3/UL
IMM GRANULOCYTES NFR BLD: 2 %
INR PPP: 1.24 (ref 0.85–1.15)
INTERPRETATION ECG - MUSE: NORMAL
LACTATE SERPL-SCNC: 1.6 MMOL/L (ref 0.7–2)
LDH FLD L TO P-CCNC: 45 U/L
LYMPHOCYTES # BLD AUTO: 0.6 10E3/UL (ref 0.8–5.3)
LYMPHOCYTES NFR BLD AUTO: 5 %
LYMPHOCYTES NFR FLD MANUAL: 11 %
MAGNESIUM SERPL-MCNC: 2.3 MG/DL (ref 1.6–2.3)
MCH RBC QN AUTO: 30.4 PG (ref 26.5–33)
MCHC RBC AUTO-ENTMCNC: 34.3 G/DL (ref 31.5–36.5)
MCV RBC AUTO: 89 FL (ref 78–100)
MONOCYTES # BLD AUTO: 0.8 10E3/UL (ref 0–1.3)
MONOCYTES NFR BLD AUTO: 6 %
MONOS+MACROS NFR FLD MANUAL: 82 %
NEUTROPHILS # BLD AUTO: 11.5 10E3/UL (ref 1.6–8.3)
NEUTROPHILS NFR BLD AUTO: 87 %
NEUTS BAND NFR FLD MANUAL: 7 %
NRBC # BLD AUTO: 0 10E3/UL
NRBC BLD AUTO-RTO: 0 /100
P AXIS - MUSE: 2 DEGREES
PHOSPHATE SERPL-MCNC: 3.5 MG/DL (ref 2.5–4.5)
PLATELET # BLD AUTO: 318 10E3/UL (ref 150–450)
POTASSIUM BLD-SCNC: 4.5 MMOL/L (ref 3.4–5.3)
PR INTERVAL - MUSE: 152 MS
PROCALCITONIN SERPL-MCNC: 0.49 NG/ML
PROT FLD-MCNC: 1 G/DL
PROT SERPL-MCNC: 7.4 G/DL (ref 6.8–8.8)
QRS DURATION - MUSE: 92 MS
QT - MUSE: 428 MS
QTC - MUSE: 465 MS
R AXIS - MUSE: 98 DEGREES
RBC # BLD AUTO: 3.55 10E6/UL (ref 3.8–5.2)
SARS-COV-2 RNA RESP QL NAA+PROBE: NEGATIVE
SODIUM SERPL-SCNC: 126 MMOL/L (ref 133–144)
SPECIMEN EXPIRATION DATE: NORMAL
SYSTOLIC BLOOD PRESSURE - MUSE: NORMAL MMHG
T AXIS - MUSE: 3 DEGREES
VENTRICULAR RATE- MUSE: 71 BPM
WBC # BLD AUTO: 13.2 10E3/UL (ref 4–11)
WBC # FLD AUTO: 58 /UL

## 2021-11-17 PROCEDURE — 89050 BODY FLUID CELL COUNT: CPT | Performed by: NURSE PRACTITIONER

## 2021-11-17 PROCEDURE — 99222 1ST HOSP IP/OBS MODERATE 55: CPT | Mod: GC | Performed by: INTERNAL MEDICINE

## 2021-11-17 PROCEDURE — 250N000013 HC RX MED GY IP 250 OP 250 PS 637: Performed by: NURSE PRACTITIONER

## 2021-11-17 PROCEDURE — 71046 X-RAY EXAM CHEST 2 VIEWS: CPT | Mod: 26 | Performed by: RADIOLOGY

## 2021-11-17 PROCEDURE — 93005 ELECTROCARDIOGRAM TRACING: CPT | Mod: 59 | Performed by: EMERGENCY MEDICINE

## 2021-11-17 PROCEDURE — 84100 ASSAY OF PHOSPHORUS: CPT | Performed by: NURSE PRACTITIONER

## 2021-11-17 PROCEDURE — 86140 C-REACTIVE PROTEIN: CPT | Performed by: SURGERY

## 2021-11-17 PROCEDURE — 87075 CULTR BACTERIA EXCEPT BLOOD: CPT | Performed by: NURSE PRACTITIONER

## 2021-11-17 PROCEDURE — 87181 SC STD AGAR DILUTION PER AGT: CPT | Performed by: NURSE PRACTITIONER

## 2021-11-17 PROCEDURE — 76705 ECHO EXAM OF ABDOMEN: CPT | Mod: 26 | Performed by: EMERGENCY MEDICINE

## 2021-11-17 PROCEDURE — 120N000011 HC R&B TRANSPLANT UMMC

## 2021-11-17 PROCEDURE — C9803 HOPD COVID-19 SPEC COLLECT: HCPCS | Performed by: EMERGENCY MEDICINE

## 2021-11-17 PROCEDURE — 86665 EPSTEIN-BARR CAPSID VCA: CPT | Performed by: NURSE PRACTITIONER

## 2021-11-17 PROCEDURE — 86644 CMV ANTIBODY: CPT | Performed by: NURSE PRACTITIONER

## 2021-11-17 PROCEDURE — 99285 EMERGENCY DEPT VISIT HI MDM: CPT | Mod: 25 | Performed by: EMERGENCY MEDICINE

## 2021-11-17 PROCEDURE — 36415 COLL VENOUS BLD VENIPUNCTURE: CPT | Performed by: NURSE PRACTITIONER

## 2021-11-17 PROCEDURE — 87340 HEPATITIS B SURFACE AG IA: CPT | Performed by: NURSE PRACTITIONER

## 2021-11-17 PROCEDURE — 36415 COLL VENOUS BLD VENIPUNCTURE: CPT | Performed by: EMERGENCY MEDICINE

## 2021-11-17 PROCEDURE — U0003 INFECTIOUS AGENT DETECTION BY NUCLEIC ACID (DNA OR RNA); SEVERE ACUTE RESPIRATORY SYNDROME CORONAVIRUS 2 (SARS-COV-2) (CORONAVIRUS DISEASE [COVID-19]), AMPLIFIED PROBE TECHNIQUE, MAKING USE OF HIGH THROUGHPUT TECHNOLOGIES AS DESCRIBED BY CMS-2020-01-R: HCPCS | Performed by: EMERGENCY MEDICINE

## 2021-11-17 PROCEDURE — 87040 BLOOD CULTURE FOR BACTERIA: CPT | Performed by: NURSE PRACTITIONER

## 2021-11-17 PROCEDURE — 85730 THROMBOPLASTIN TIME PARTIAL: CPT | Performed by: NURSE PRACTITIONER

## 2021-11-17 PROCEDURE — 87389 HIV-1 AG W/HIV-1&-2 AB AG IA: CPT | Performed by: NURSE PRACTITIONER

## 2021-11-17 PROCEDURE — 80053 COMPREHEN METABOLIC PANEL: CPT | Performed by: EMERGENCY MEDICINE

## 2021-11-17 PROCEDURE — 49083 ABD PARACENTESIS W/IMAGING: CPT | Performed by: STUDENT IN AN ORGANIZED HEALTH CARE EDUCATION/TRAINING PROGRAM

## 2021-11-17 PROCEDURE — 70450 CT HEAD/BRAIN W/O DYE: CPT

## 2021-11-17 PROCEDURE — 86704 HEP B CORE ANTIBODY TOTAL: CPT | Performed by: NURSE PRACTITIONER

## 2021-11-17 PROCEDURE — 84145 PROCALCITONIN (PCT): CPT | Performed by: SURGERY

## 2021-11-17 PROCEDURE — 99207 PR NO BILLABLE SERVICE THIS VISIT: CPT | Performed by: INTERNAL MEDICINE

## 2021-11-17 PROCEDURE — 82150 ASSAY OF AMYLASE: CPT | Performed by: NURSE PRACTITIONER

## 2021-11-17 PROCEDURE — 85610 PROTHROMBIN TIME: CPT | Performed by: EMERGENCY MEDICINE

## 2021-11-17 PROCEDURE — 87516 HEPATITIS B DNA AMP PROBE: CPT | Performed by: NURSE PRACTITIONER

## 2021-11-17 PROCEDURE — 87205 SMEAR GRAM STAIN: CPT | Performed by: NURSE PRACTITIONER

## 2021-11-17 PROCEDURE — 85384 FIBRINOGEN ACTIVITY: CPT | Performed by: NURSE PRACTITIONER

## 2021-11-17 PROCEDURE — 99024 POSTOP FOLLOW-UP VISIT: CPT | Performed by: TRANSPLANT SURGERY

## 2021-11-17 PROCEDURE — 85025 COMPLETE CBC W/AUTO DIFF WBC: CPT | Performed by: EMERGENCY MEDICINE

## 2021-11-17 PROCEDURE — 87070 CULTURE OTHR SPECIMN AEROBIC: CPT | Performed by: NURSE PRACTITIONER

## 2021-11-17 PROCEDURE — 87102 FUNGUS ISOLATION CULTURE: CPT | Performed by: NURSE PRACTITIONER

## 2021-11-17 PROCEDURE — 36415 COLL VENOUS BLD VENIPUNCTURE: CPT | Performed by: SURGERY

## 2021-11-17 PROCEDURE — 84157 ASSAY OF PROTEIN OTHER: CPT | Performed by: NURSE PRACTITIONER

## 2021-11-17 PROCEDURE — 83605 ASSAY OF LACTIC ACID: CPT | Performed by: EMERGENCY MEDICINE

## 2021-11-17 PROCEDURE — 0W9G3ZX DRAINAGE OF PERITONEAL CAVITY, PERCUTANEOUS APPROACH, DIAGNOSTIC: ICD-10-PCS | Performed by: STUDENT IN AN ORGANIZED HEALTH CARE EDUCATION/TRAINING PROGRAM

## 2021-11-17 PROCEDURE — 86803 HEPATITIS C AB TEST: CPT | Performed by: NURSE PRACTITIONER

## 2021-11-17 PROCEDURE — 93010 ELECTROCARDIOGRAM REPORT: CPT | Mod: 59 | Performed by: EMERGENCY MEDICINE

## 2021-11-17 PROCEDURE — 82140 ASSAY OF AMMONIA: CPT | Performed by: EMERGENCY MEDICINE

## 2021-11-17 PROCEDURE — 86645 CMV ANTIBODY IGM: CPT | Performed by: NURSE PRACTITIONER

## 2021-11-17 PROCEDURE — 70450 CT HEAD/BRAIN W/O DYE: CPT | Mod: 26 | Performed by: RADIOLOGY

## 2021-11-17 PROCEDURE — 83615 LACTATE (LD) (LDH) ENZYME: CPT | Performed by: NURSE PRACTITIONER

## 2021-11-17 PROCEDURE — 76705 ECHO EXAM OF ABDOMEN: CPT | Performed by: EMERGENCY MEDICINE

## 2021-11-17 PROCEDURE — 71046 X-RAY EXAM CHEST 2 VIEWS: CPT

## 2021-11-17 PROCEDURE — 83735 ASSAY OF MAGNESIUM: CPT | Performed by: NURSE PRACTITIONER

## 2021-11-17 PROCEDURE — 89051 BODY FLUID CELL COUNT: CPT | Performed by: NURSE PRACTITIONER

## 2021-11-17 PROCEDURE — 86900 BLOOD TYPING SEROLOGIC ABO: CPT | Performed by: EMERGENCY MEDICINE

## 2021-11-17 PROCEDURE — 86706 HEP B SURFACE ANTIBODY: CPT | Performed by: NURSE PRACTITIONER

## 2021-11-17 RX ORDER — LACTULOSE 10 G/10G
20 SOLUTION ORAL 2 TIMES DAILY
Status: DISCONTINUED | OUTPATIENT
Start: 2021-11-17 | End: 2021-11-17

## 2021-11-17 RX ORDER — LIDOCAINE 40 MG/G
CREAM TOPICAL
Status: DISCONTINUED | OUTPATIENT
Start: 2021-11-17 | End: 2021-11-18

## 2021-11-17 RX ORDER — CIPROFLOXACIN 500 MG/1
500 TABLET, FILM COATED ORAL EVERY 24 HOURS
Status: DISCONTINUED | OUTPATIENT
Start: 2021-11-17 | End: 2021-11-18

## 2021-11-17 RX ORDER — LACTULOSE 10 G/10G
20 SOLUTION ORAL 2 TIMES DAILY
Status: DISCONTINUED | OUTPATIENT
Start: 2021-11-17 | End: 2021-11-18

## 2021-11-17 RX ORDER — PIPERACILLIN SODIUM, TAZOBACTAM SODIUM 2; .25 G/10ML; G/10ML
2.25 INJECTION, POWDER, LYOPHILIZED, FOR SOLUTION INTRAVENOUS
Status: DISCONTINUED | OUTPATIENT
Start: 2021-11-18 | End: 2021-11-18

## 2021-11-17 RX ORDER — FLUCONAZOLE 2 MG/ML
400 INJECTION, SOLUTION INTRAVENOUS ONCE
Status: COMPLETED | OUTPATIENT
Start: 2021-11-18 | End: 2021-11-18

## 2021-11-17 RX ORDER — ACETAMINOPHEN 325 MG/1
975 TABLET ORAL ONCE
Status: DISCONTINUED | OUTPATIENT
Start: 2021-11-17 | End: 2021-11-18

## 2021-11-17 RX ORDER — LACTULOSE 10 G/15ML
100 SOLUTION ORAL
Status: DISCONTINUED | OUTPATIENT
Start: 2021-11-17 | End: 2021-11-18

## 2021-11-17 RX ORDER — PIPERACILLIN SODIUM, TAZOBACTAM SODIUM 3; .375 G/15ML; G/15ML
3.38 INJECTION, POWDER, LYOPHILIZED, FOR SOLUTION INTRAVENOUS ONCE
Status: COMPLETED | OUTPATIENT
Start: 2021-11-18 | End: 2021-11-18

## 2021-11-17 RX ORDER — LACTULOSE 10 G/15ML
20 SOLUTION ORAL
Status: DISCONTINUED | OUTPATIENT
Start: 2021-11-17 | End: 2021-11-18

## 2021-11-17 RX ADMIN — LACTULOSE 20 G: 20 SOLUTION ORAL at 20:41

## 2021-11-17 RX ADMIN — LACTULOSE 20 G: 20 SOLUTION ORAL at 16:28

## 2021-11-17 ASSESSMENT — ACTIVITIES OF DAILY LIVING (ADL)
ADLS_ACUITY_SCORE: 10
DRESSING/BATHING_DIFFICULTY: NO
DOING_ERRANDS_INDEPENDENTLY_DIFFICULTY: YES
ADLS_ACUITY_SCORE: 7
TOILETING_ISSUES: NO
WEAR_GLASSES_OR_BLIND: YES
ADLS_ACUITY_SCORE: 10
CONCENTRATING,_REMEMBERING_OR_MAKING_DECISIONS_DIFFICULTY: OTHER (SEE COMMENTS)
HEARING_DIFFICULTY_OR_DEAF: YES
DIFFICULTY_EATING/SWALLOWING: NO
WHICH_OF_THE_ABOVE_FUNCTIONAL_RISKS_HAD_A_RECENT_ONSET_OR_CHANGE?: COGNITION
ADLS_ACUITY_SCORE: 10
PATIENT_/_FAMILY_COMMUNICATION_STYLE: SPOKEN LANGUAGE (ENGLISH OR BILINGUAL)
WERE_AUXILIARY_AIDS_OFFERED?: YES
ADLS_ACUITY_SCORE: 10
DIFFICULTY_COMMUNICATING: NO
FALL_HISTORY_WITHIN_LAST_SIX_MONTHS: NO
WALKING_OR_CLIMBING_STAIRS_DIFFICULTY: NO
THE_FOLLOWING_AIDS_WERE_PROVIDED;: PATIENT DECLINED OFFER OF AUXILIARY AIDS
USE_OF_HEARING_ASSISTIVE_DEVICES: BILATERAL HEARING AIDS
ADLS_ACUITY_SCORE: 10
PATIENT'S_PREFERRED_MEANS_OF_COMMUNICATION: ENGLISH SPEAKER WITH HEARING LOSS, NO SPEECH PROBLEMS.
ADLS_ACUITY_SCORE: 7
DESCRIBE_HEARING_LOSS: BILATERAL HEARING LOSS
ADLS_ACUITY_SCORE: 7

## 2021-11-17 ASSESSMENT — ENCOUNTER SYMPTOMS
ROS GI COMMENTS: NEGATIVE FOR HEMATEMESIS
CONFUSION: 1
HEADACHES: 0
DYSRHYTHMIAS: 1
FEVER: 0
ABDOMINAL PAIN: 0
BLOOD IN STOOL: 0

## 2021-11-17 ASSESSMENT — MIFFLIN-ST. JEOR: SCORE: 1156.95

## 2021-11-17 NOTE — LETTER
Transition Communication Hand-off for Care Transitions to Next Level of Care Provider    Name: Berat Nava  : 1965  MRN #: [id number]  Primary Care Provider: Tila Nevarez     Primary Clinic: [address] [place] [address]     Reason for Hospitalization:  Hepatic encephalopathy (H) [K72.90]  Encephalopathy [G93.40]  Chronic liver failure without hepatic coma (H) [K72.10]  Admit Date/Time: 2021  1:51 PM  Discharge Date: 2021  Payor Source: Payor: [facility] NETWORK / Plan: [employer]PLANT / Product Type: Indemnity /   Reason for Communication Hand-off Referral: Other  continuity of care    Discharge Plan: per attached AVS  Concern for non-adherence with plan of care:   Y/N no  Discharge Needs Assessment:  Needs      Most Recent Value   Equipment Currently Used at Home grab bar, tub/shower   # of Referrals Placed by [facility] Homecare, Home Infusion, External Care Coordination        Follow-up plan:    Future Appointments   Date Time Provider Department Center   2021 10:30 AM UU PT WAITLIST [facility] O   2021  2:00 PM UU OT WAITLIST [facility] O   2021 10:15 AM  LAB [facility]   2021 11:30 AM Ernesto Schmitt MD [facility]   2021 10:15 AM  LAB [facility]   2021 11:30 AM Ernesto Schmitt MD [facility]   2021 10:15 AM  LAB [facility]   2021 11:30 AM Ernesto Schmitt MD [facility]   2021 10:15 AM [facility]   2021 11:30 AM Ernesto Schmitt MD [facility]   2022 11:30 AM Leventhal, Thomas Michael, MD [facility]           Referrals     Future Labs/Procedures    Home care nursing referral     Comments:    RN skilled nursing visit. RN to assess vital signs and weight, patients ability to take and record daily blood pressure, temp and weight, pain level and activity tolerance, incision for signs/symptoms of infection, hydration, nutrition and bowel status and  home safety.  RN to teach medication management.  RN to provide line care per agency protocol and lab draws.      [facility]  Phone: [phone]  Fax: [phone]    Home Care visits to start following completion of daily ATC Clinic visits ([phone])Skilled nursing visits to monitor cardiac and resp status  Monitor hydration, nutrition, urinary and bowel status  Monitor healing of incisionInstruct in medications and eval effects  Assist / teach patient to obtain and record lab results in handbookLab draws(mornings) per orders, report results to Post Transplant Coordinator:  Vanessa Durant, RN #[phone] Fax # [phone]   like 1    Your provider has ordered home care nursing services. If you have not been contacted within 2 days of your discharge please call the inpatient department phone number at [phone] .  Please call the facility your provider referred you to schedule your appointment      Home infusion referral     Comments:    [facility]  Ph: [phone]    Continuous tube feeding via pump  Adult Formula: TwoCal HN  Route: Nasoduodenal tube  Rate: 40 mL/hr  Medication-Feeding tube flush frequency: at least 15-30 mL waer before and after medication administration and with tube clogging.  Free water flushes 30 mL every 4 hours.            Key Recommendations:  Per attached AVS    Alessandro Umanzor, RN    AVS/Discharge Summary is the source of truth; this is a helpful guide for improved communication of patient story

## 2021-11-17 NOTE — H&P
I examined the patient, discussed with the resident, agree with below notes.  Plan is liver transplantation.        [facility]    History and Physical  Solid Organ Transplant     Date of Admission:  11/17/2021    Assessment & Plan   Berta Nava is a 56 year old female who presents with HE/AMS for potential liver transplant  -NPO at midnight  -Hold diuretics  -Consult Cardiology, Hepatology  -Infectious workup to include CXR, UA, Blood Cx  -Obtain consent for surgery  -CXR  -EKG  -Preoperative labs  -correct mag/potassium as needed    Code Status   Full Code  Disposition Plan   Expected discharge: > 7 days, likely to TCU.     Entered: Nini Zhou 11/17/2021, 3:30 PM   Information in the above section will display in the discharge planner report.    Nini Zhou, NP    Primary Care Physician   Tila Nevarez    Chief Complaint   AMS, Liver transplant Candidate    History is obtained from the patient and the patient's daughter    History of Present Illness     Berta Nava is a 56 year old female with PMH significant for Primary sclerosing cholangitis, cirrhosis c/b Ascites requiring q week paracentesis with 5 -6 L vol removal (last 11/15), HE, large EV(non banded). She also has UC on mesalamine, biliary strictures and choledocholithiasis, recurrent Cholangitis s/p ERCP.  her last ERCP was 10/1/21. At that time, new stones were noted in the L intrahepatic and common ducts. Clearance of intra and extrahepatic stones, sludges and pus was completed with no residual stones. self-ejected 4 Fr Castanon stents were placed into the biliary tree to allow a stent free trial given complete clearance of ducts.      She was recently hospitalized 11/6-11/9 for complications with her Cirrhosis which included HE and GONZALO. She presents to the ED this am with AMS, while there a liver offer became available to her. Patient was previously taken to the operating room for a  possible transplant on Aug 18, 2021.  Intraoperatively she developed either V. tach or wide-complex atrial fibrillation that was unstable and had to be shocked 5 times.  The transplant was aborted. She underwent a left heart cath and an MRI of the heart which did not demonstrate any overt abnormalities that would have predisposed her to this condition.  She was treated with amiodarone during the hospital course.  She was seen and cleared by Cardiology/EP Dr. Guan on 10/7/21 who did not think an ICD or antiarrhythmic drugs were needed.        She was doing well until this morning her daughter noticed she was confused. She has been having 2-3 BMs a day and taking lactulose without missing a dose. She had Paracentesis Monday with 6L off. She denies fever, chills, cough, chest pain, abdominal pain, shortness of breath, headache.     Past Medical History    I have reviewed this patient's medical history and updated it with pertinent information if needed.   Past Medical History:   Diagnosis Date    Ascites     Biliary cirrhosis (H)     Cholangitis, sclerosing     Cirrhosis of liver with ascites (H) 3/3/2021    Hearing loss of left ear     wears a hearing aide    History of low potassium     Hyperlipidemia     Hypertension     SBP (spontaneous bacterial peritonitis) (H) 4/30/2021    Sjogren's syndrome (H)     Ulcerative colitis (H)     Ulcerative pancolitis (H)        Past Surgical History   I have reviewed this patient's surgical history and updated it with pertinent information if needed.  Past Surgical History:   Procedure Laterality Date    CV CORONARY ANGIOGRAM N/A 8/25/2021    Procedure: Coronary Angiogram with possible intervention;  Surgeon: David Wilhelm MD;  Location: [facility] CARDIAC CATH LAB    ENDOSCOPIC RETROGRADE CHOLANGIOPANCREATOGRAM N/A 6/25/2021    Procedure: ENDOSCOPIC RETROGRADE CHOLANGIOPANCREATOGRAPHY with ballon sweep of bile ducts for stones, balloon dilation of bile ducts and bile duct  stent placement;  Surgeon: Gregory Gabriel MD;  Location: [facility] OR    ENDOSCOPIC RETROGRADE CHOLANGIOPANCREATOGRAM N/A 7/22/2021    Procedure: ENDOSCOPIC RETROGRADE CHOLANGIOPANCREATOGRAPHY STONE REMOVAL, DILATION AND STENT PLACEMENT;  Surgeon: Gregory Gabriel MD;  Location:  OR    ENDOSCOPIC RETROGRADE CHOLANGIOPANCREATOGRAPHY, EXCHANGE TUBE/STENT N/A 05/05/2021    Procedure: ENDOSCOPIC RETROGRADE CHOLANGIOPANCREATOGRAPHY WITH STENT EXCHANGE, STONE EXTRACTION, AND DILATION;  Surgeon: Gregory Gabriel MD;  Location: [facility] OR    ENDOSCOPIC RETROGRADE CHOLANGIOPANCREATOGRAPHY, EXCHANGE TUBE/STENT N/A 5/10/2021    Procedure: ENDOSCOPIC RETROGRADE CHOLANGIOPANCREATOGRAPHY with biliary dilation, stone removal, stent exchange;  Surgeon: Gregory Gabriel MD;  Location: [facility] OR    ENDOSCOPIC RETROGRADE CHOLANGIOPANCREATOGRAPHY, EXCHANGE TUBE/STENT N/A 6/10/2021    Procedure: ENDOSCOPIC RETROGRADE CHOLANGIOPANCREATOGRAPHY, WITH biliary stent exchange, stone removal;  Surgeon: Woodrow Lott MD;  Location: [facility] OR    ENDOSCOPIC RETROGRADE CHOLANGIOPANCREATOGRAPHY, EXCHANGE TUBE/STENT N/A 8/30/2021    Procedure: ENDOSCOPIC RETROGRADE CHOLANGIOPANCREATOGRAPHY with biliary dilation, stone removal, stent exchange;  Surgeon: Gregory Gabriel MD;  Location: [facility] OR    ENDOSCOPIC RETROGRADE CHOLANGIOPANCREATOGRAPHY, EXCHANGE TUBE/STENT N/A 10/1/2021    Procedure: ENDOSCOPIC RETROGRADE CHOLANGIOPANCREATOGRAPHY with balloon sweep of bile ducts, bile duct stent exchanged;  Surgeon: Gregory Gabriel MD;  Location: [facility] OR    ESOPHAGOSCOPY, GASTROSCOPY, DUODENOSCOPY (EGD), COMBINED N/A 9/4/2021    Procedure: ESOPHAGOGASTRODUODENOSCOPY (EGD);  Surgeon: Leventhal, Thomas Michael, MD;  Location:  GI    ESOPHAGOSCOPY, GASTROSCOPY, DUODENOSCOPY (EGD), COMBINED N/A 10/1/2021    Procedure: ESOPHAGOGASTRODUODENOSCOPY (EGD) with varices banding;  Surgeon: Gregory Gabriel MD;  Location: [facility] OR    GYN SURGERY      bilat fallopian tubes and ovaries removed     PICC DOUBLE LUMEN PLACEMENT Right 2021    42cm (2cm external), Lateral brachial vein    TRANSPLANT LIVER RECIPIENT  DONOR N/A 2021    Procedure: Opening of abdomen, Abdominal Exploration and aborted liver transplant.;  Surgeon: Ernesto Schmitt MD;  Location: [facility] OR       Prior to Admission Medications   Prior to Admission Medications   Prescriptions Last Dose Informant Patient Reported? Taking?   ciprofloxacin (CIPRO) 500 MG tablet   No No   Sig: Take 1 tablet (500 mg) by mouth every 24 hours   furosemide (LASIX) 20 MG tablet   Yes No   Sig: Take 1 tablet (20 mg) by mouth Take 2 tablets in the morning and 1 tablet 6 hours later   lactulose (CEPHULAC) 10 GM packet   No No   Sig: Take 2 packets (20 g) by mouth 2 times daily (Titrate up to 4 times daily if not having at least 2 bowel movements daily)   multivitamin (CENTRUM SILVER) tablet   Yes No   Sig: Take 1 tablet by mouth daily   omeprazole (PRILOSEC) 20 MG DR capsule   No No   Sig: Take 2 capsules (40 mg) by mouth 2 times daily   ondansetron (ZOFRAN-ODT) 4 MG ODT tab   No No   Sig: Take 1 tablet (4 mg) by mouth every 8 hours as needed for nausea   spironolactone (ALDACTONE) 50 MG tablet   Yes No   Sig: Take 1 tablet (50 mg) by mouth Take 2 tablets in the morning and 1 tablet 6 hours later   sucralfate (CARAFATE) 1 GM tablet   Yes No   Sig: Take 1 g by mouth 4 times daily    ursodiol (ACTIGALL) 300 MG capsule   No No   Sig: Take 1 capsule (300 mg) by mouth 2 times daily      Facility-Administered Medications: None     Allergies   Allergies   Allergen Reactions    Diagnostic X-Ray Materials Hives     PATIENT HAD HIVE REACTION AFTER ADMINISTERING CT CONTRAST DYE.      Contrast Dye        Social History   I have reviewed this patient's social history and updated it with pertinent information if needed. Berta Nava  reports that she has never smoked. She has never used smokeless tobacco. She reports that she does not drink alcohol and does  not use drugs.    Family History   I have reviewed this patient's family history and updated it with pertinent information if needed.   Family History   Problem Relation Age of Onset    Cancer Mother         angiosarcoma    Coronary Artery Disease Early Onset Father         MI age 46    Heart Transplant Father     Liver Disease No family hx of     Ulcerative Colitis No family hx of     Crohn's Disease No family hx of        Review of Systems   The 10 point Review of Systems is negative other than noted in the HPI or here.     Physical Exam   Temp: 97.6  F (36.4  C) Temp src: Oral BP: 133/75 Pulse: 77   Resp: 15 SpO2: 100 % O2 Device: None (Room air)    Vital Signs with Ranges  Temp:  [97.6  F (36.4  C)] 97.6  F (36.4  C)  Pulse:  [77] 77  Resp:  [15] 15  BP: (133)/(75) 133/75  SpO2:  [100 %] 100 %  127 lbs 0 oz    Constitutional: Awake, alert, cooperative, no apparent distress,   Eyes: scleral icterus.  ENT: Normocephalic, without obvious abnormality, atraumatic, sinuses nontender on palpation, external ears without lesions, oral pharynx with moist mucus membranes, tonsils without erythema or exudates, gums normal and good dentition.  Respiratory: No increased work of breathing, good air exchange, clear to auscultation bilaterally, no crackles or wheezing.  Cardiovascular: Normal apical impulse, regular rate and rhythm, normal S1 and S2, no S3 or S4, and no murmur noted.  GI: Abdominal mercedes philip incision, well healed soft, distended, non-tender, no masses palpated, no hepatosplenomegaly.  Skin: No bruising or bleeding, jaundiced skin color, texture, turgor, no redness, warmth, or swelling, no rashes, no lesions, no abnormal moles, nails normal without discoloration or clubbing    Musculoskeletal: There is no redness, warmth, or swelling of the joints.  Full range of motion noted.  Motor strength is 5 out of 5 all extremities bilaterally.  Tone is normal.  Neurologic: Awake, alert, oriented to name and place not  to time.  Cranial nerves II-XII are grossly intact.    Neuropsychiatric: Calm, normal eye contact, alert, normal affect  ,Data   Results for orders placed or performed during the hospital encounter of 11/17/21 (from the past 24 hour(s))   Ammonia (on ice)   Result Value Ref Range    Ammonia 78 (H) 10 - 50 umol/L   ABO/Rh type and screen    Narrative    The following orders were created for panel order ABO/Rh type and screen.  Procedure                               Abnormality         Status                     ---------                               -----------         ------                     Adult Type and Screen[[id number]]                            Final result                 Please view results for these tests on the individual orders.   [place] Draw    Narrative    The following orders were created for panel order [place] Draw.  Procedure                               Abnormality         Status                     ---------                               -----------         ------                     Extra Blue Top Tube[[id number]]                              In process                 Extra Red Top Tube[[id number]]                               In process                 Extra Green Top (Lithium...[[id number]]                      In process                 Extra Purple Top Tube[[id number]]                            In process                   Please view results for these tests on the individual orders.   Adult Type and Screen   Result Value Ref Range    ABO/RH(D) AB POS     Antibody Screen Negative Negative    SPECIMEN EXPIRATION DATE [id number]    CBC with platelets differential    Narrative    The following orders were created for panel order CBC with platelets differential.  Procedure                               Abnormality         Status                     ---------                               -----------         ------                     CBC with platelets and d...[[id number]]  Abnormal             Final result                 Please view results for these tests on the individual orders.   CBC with platelets and differential   Result Value Ref Range    WBC Count 13.2 (H) 4.0 - 11.0 10e3/uL    RBC Count 3.55 (L) 3.80 - 5.20 10e6/uL    Hemoglobin 10.8 (L) 11.7 - 15.7 g/dL    Hematocrit 31.5 (L) 35.0 - 47.0 %    MCV 89 78 - 100 fL    MCH 30.4 26.5 - 33.0 pg    MCHC 34.3 31.5 - 36.5 g/dL    RDW 15.3 (H) 10.0 - 15.0 %    Platelet Count 318 150 - 450 10e3/uL    % Neutrophils 87 %    % Lymphocytes 5 %    % Monocytes 6 %    % Eosinophils 0 %    % Basophils 0 %    % Immature Granulocytes 2 %    NRBCs per 100 WBC 0 <1 /100    Absolute Neutrophils 11.5 (H) 1.6 - 8.3 10e3/uL    Absolute Lymphocytes 0.6 (L) 0.8 - 5.3 10e3/uL    Absolute Monocytes 0.8 0.0 - 1.3 10e3/uL    Absolute Eosinophils 0.0 0.0 - 0.7 10e3/uL    Absolute Basophils 0.1 0.0 - 0.2 10e3/uL    Absolute Immature Granulocytes 0.2 (H) <=0.0 10e3/uL    Absolute NRBCs 0.0 10e3/uL   Head CT w/o contrast    Impression    RESIDENT PRELIMINARY INTERPRETATION  Impression:  No acute intracranial pathology.        Stress Test:  Interpretation Summary  Dobutamine stress echocardiogram with no inducible ischemia.     Target heart rate achieved. Hypotensive response to dobutamine  No angina symptoms during stress test.  No ECG evidence of ischemia at rest or with dobutamine.  Normal segmental and global left ventricular systolic function at baseline,  LVEF 55-60%.  With peak dobutamine, LVEF increased further to >70% and LV cavity size  decreased appropriately.  No stress induced regional wall motion abnormalities.     Normal aortic root and no significant valvular dysfunction noted on screening  2D and Doppler examination.     Coronary Angiogram:  Diagnostic  Dominance: Right     Left Main   The vessel was visualized by selective angiography. There was 0% vessel disease.   Left Anterior Descending   The vessel was visualized by selective angiography. There was 0%  vessel disease.   Left Circumflex   The vessel was visualized by selective angiography. There was 0% vessel disease.   Right Coronary Artery   The vessel was visualized by selective angiography. There was 0% vessel disease.            MRI Cardiac 8/31/21   1. The LV is normal in cavity size and wall thickness. The global systolic function is hyperdynamic. The LVEF is >70%. There are no regional wall motion abnormalities.  2. The RV is normal in cavity size. The global systolic function is hyperdynamic. The RVEF is >70%.    3. Both atria are normal in size.  4. There is no significant valvular disease.   5. Late gadolinium enhancement imaging shows no MI, fibrosis or infiltrative disease.   6. There is no pericardial effusion or thickening.  7. There is no intracardiac thrombus.  8. A central venous catheter is noted in the superior vena cava. It extends into the right atrium.

## 2021-11-17 NOTE — CONSULTS
[facility]    Cardiology Consult Note-Consult Service      Date of Admission:  11/17/2021  Consult Requested by: Nini Zhou  Reason for Consult: preoperative optimization/clearance for preliver transplant with history of wide complex tachycardia    Assessment & Plan: HVSL   Berta Nava is a 56 year old female with a history of hypertension, microscopic colitis, ulcerative colitis, pancreatic mucinous cystadenoma, primary sclerosing cholangitis, cirrhosis who was recently admitted for a liver transplantation which was aborted given wide complex tachycardia. She now presents for liver transplant. Cardiology consulted in setting of this.    Overall patient with prior history of wide complex tachycardia intra-operatively for liver transplant requiring cardioversion. No rhythm strips/EKGs to review from that event and no precise etiology to precipitate event, though could be r/t stress of surgery, anesthesia, QT prolongation (patient with borderline prolonged QT) etc. Reassuringly, patient underwent extensive cardiac workup including cMRI and angiography which noted no coronary artery disease and patient has had no recurrent episodes since that time including while off AAT. Given this, would reference prior EP evaluations that no further EP workup needed prior to undergoing transplant. Regarding her overall cardiac assessment, has had prior MRI and angiography which showed no evidence of CAD and she is currently able to complete >4 METs activity without development of any symptoms concerning for angina. She also shows no current evidence clinically of heart failure. Given this, the patient does not require any additional cardiac testing prior to undergoing surgical intervention. Would recommend ensuring her electrolytes are adequately replete and that QT prolonging medications are avoided as able in case these contributed in any way to her prior arrhythmia. Though  it cannot be certain that these arrhythmias will not recur intraoperatively, no current contraindication to proceeding.   -avoid QT prolonging medications as able  -ensure electrolytes replete as able       The patient was discussed with Dr. Nobles, who agrees with the above assessment and plan.      Joanne Coates MD  [facility]  Pager: [phone]    Addendum:  I saw and evaluated the patient and agree with the fellow s finding and plans as written.  Namrata Nobles MD  ____________________________________________________________________    Chief Complaint   Pre-transplant    History is obtained from the patient and chart    History of Present Illness   Berta Nava is a 56 year old female with a history of hypertension, microscopic colitis, ulcerative colitis, pancreatic mucinous cystadenoma, primary sclerosing cholangitis, cirrhosis who was recently admitted for a liver transplantation which was aborted given wide complex tachycardia.     At the time of prior transplant, she was brought into the hospital on August 18 and was undergoing liver transplantation, but ultimately developed either V. tach or wide-complex atrial fibrillation that was unstable.  During operative course she was shocked at least 5 times, and it was a committee decision that the transplant be aborted given her cardiogenic instability.  She underwent a left heart cath and an MRI of the heart which did not demonstrate any overt abnormalities that would have predisposed her to this condition.  She was treated with amiodarone during the hospital course but electrophysiology felt that she could safely discontinue.     The patient was then seen in EP clinic, at which time she noted overall to be feeling well. At that time, her case was reviewed and at that time and noted for normal post operative evaluations. Evaluated again at that time and felt that overall no additional therapy such as ICD or  antiarrhythmics were needed with no contraindication to proceeding with surgery, though could not guarantee repeat recurrence though could consider IV amiodarone (though no data to support this).    She now arrives for liver transplant. Reports feeling well. No chest pain, SOB, edema. Able to lay flat at night. Able to walk up one flight of stairs without development of SOB or angina. No feeling of palpitations. No syncope or presyncope.     Review of Systems   The 10 point Review of Systems is negative other than noted in the HPI or here.     Past Medical History    I have reviewed this patient's medical history and updated it with pertinent information if needed.   Past Medical History:   Diagnosis Date     Ascites      Biliary cirrhosis (H)      Cholangitis, sclerosing      Cirrhosis of liver with ascites (H) 3/3/2021     Hearing loss of left ear     wears a hearing aide     History of low potassium      Hyperlipidemia      Hypertension      SBP (spontaneous bacterial peritonitis) (H) 4/30/2021     Sjogren's syndrome (H)      Ulcerative colitis (H)      Ulcerative pancolitis (H)        Past Surgical History   I have reviewed this patient's surgical history and updated it with pertinent information if needed.  Past Surgical History:   Procedure Laterality Date     CV CORONARY ANGIOGRAM N/A 8/25/2021    Procedure: Coronary Angiogram with possible intervention;  Surgeon: David Wilhelm MD;  Location:  [facility]ARD[facility] CATH LAB     ENDOSCOPIC RETROGRADE CHOLANGIOPANCREATOGRAM N/A 6/25/2021    Procedure: ENDOSCOPIC RETROGRADE CHOLANGIOPANCREATOGRAPHY with ballon sweep of bile ducts for stones, balloon dilation of bile ducts and bile duct stent placement;  Surgeon: Gregory Gabriel MD;  Location:  OR     ENDOSCOPIC RETROGRADE CHOLANGIOPANCREATOGRAM N/A 7/22/2021    Procedure: ENDOSCOPIC RETROGRADE CHOLANGIOPANCREATOGRAPHY STONE REMOVAL, DILATION AND STENT PLACEMENT;  Surgeon: Gregory Gabriel MD;  Location:  OR      ENDOSCOPIC RETROGRADE CHOLANGIOPANCREATOGRAPHY, EXCHANGE TUBE/STENT N/A 2021    Procedure: ENDOSCOPIC RETROGRADE CHOLANGIOPANCREATOGRAPHY WITH STENT EXCHANGE, STONE EXTRACTION, AND DILATION;  Surgeon: Gregory Gabriel MD;  Location: [facility]     ENDOSCOPIC RETROGRADE CHOLANGIOPANCREATOGRAPHY, EXCHANGE TUBE/STENT N/A 5/10/2021    Procedure: ENDOSCOPIC RETROGRADE CHOLANGIOPANCREATOGRAPHY with biliary dilation, stone removal, stent exchange;  Surgeon: Gregory Gabriel MD;  Location: [facility]     ENDOSCOPIC RETROGRADE CHOLANGIOPANCREATOGRAPHY, EXCHANGE TUBE/STENT N/A 6/10/2021    Procedure: ENDOSCOPIC RETROGRADE CHOLANGIOPANCREATOGRAPHY, WITH biliary stent exchange, stone removal;  Surgeon: Woodrow Lott MD;  Location: [facility] OR     ENDOSCOPIC RETROGRADE CHOLANGIOPANCREATOGRAPHY, EXCHANGE TUBE/STENT N/A 2021    Procedure: ENDOSCOPIC RETROGRADE CHOLANGIOPANCREATOGRAPHY with biliary dilation, stone removal, stent exchange;  Surgeon: Gregory Gabriel MD;  Location: [facility]     ENDOSCOPIC RETROGRADE CHOLANGIOPANCREATOGRAPHY, EXCHANGE TUBE/STENT N/A 10/1/2021    Procedure: ENDOSCOPIC RETROGRADE CHOLANGIOPANCREATOGRAPHY with balloon sweep of bile ducts, bile duct stent exchanged;  Surgeon: Gregory Gabriel MD;  Location: [facility]     ESOPHAGOSCOPY, GASTROSCOPY, DUODENOSCOPY (EGD), COMBINED N/A 2021    Procedure: ESOPHAGOGASTRODUODENOSCOPY (EGD);  Surgeon: Leventhal, Thomas Michael, MD;  Location:  GI     ESOPHAGOSCOPY, GASTROSCOPY, DUODENOSCOPY (EGD), COMBINED N/A 10/1/2021    Procedure: ESOPHAGOGASTRODUODENOSCOPY (EGD) with varices banding;  Surgeon: Gregory Gabriel MD;  Location: [facility] OR     GYN SURGERY      bilat fallopian tubes and ovaries removed     PICC DOUBLE LUMEN PLACEMENT Right 2021    42cm (2cm external), Lateral brachial vein     TRANSPLANT LIVER RECIPIENT  DONOR N/A 2021    Procedure: Opening of abdomen, Abdominal Exploration and aborted liver transplant.;  Surgeon: Ernesto Schmitt  MD;  Location: [facility] OR       Social History   I have reviewed this patient's social history and updated it with pertinent information if needed.  Social History     Tobacco Use     Smoking status: Never Smoker     Smokeless tobacco: Never Used   Vaping Use     Vaping Use: Never used   Substance Use Topics     Alcohol use: No     Comment: Last drink was 2017     Drug use: No     Family History   I have reviewed this patient's family history and updated it with pertinent information if needed.   I have reviewed this patient's family history and updated it with pertinent information if needed.  Family History   Problem Relation Age of Onset     Cancer Mother         angiosarcoma     Coronary Artery Disease Early Onset Father         MI age 46     Heart Transplant Father      Liver Disease No family hx of      Ulcerative Colitis No family hx of      Crohn's Disease No family hx of        Medications   I have reviewed this patient's current medications    Allergies   Allergies   Allergen Reactions     Diagnostic X-Ray Materials Hives     PATIENT HAD HIVE REACTION AFTER ADMINISTERING CT CONTRAST DYE.       Contrast Dye        Physical Exam   Vital Signs: Temp: 97.6  F (36.4  C) Temp src: Oral BP: 133/75 Pulse: 77   Resp: 15 SpO2: 100 % O2 Device: None (Room air)    Weight: 127 lbs 0 oz    General Appearance: chronically ill appearing woman in NAD resting in bed, polite and conversational  Eyes: sclera icteric  HEENT: at/nc, eomi, mmm, no JVD  Respiratory: ctab on RA, nonlabored  Cardiovascular: rrr, s1, s2, no murmur appreciated  GI: soft, distended, nt, BS+  Skin: warm and dry, no rash on exposed skin, no open sores/wounds appreciated  Musculoskeletal: adequate muscle bulk, trace bilateral LE edema  Neurologic: awake, alert, interactive, oriented to person, place, time, speaking in full sentences, moving all 4        Data   I personally reviewed no images or EKG's today.  Results for orders placed or performed during the  hospital encounter of 11/17/21 (from the past 24 hour(s))   Ammonia (on ice)   Result Value Ref Range    Ammonia 78 (H) 10 - 50 umol/L   ABO/Rh type and screen    Narrative    The following orders were created for panel order ABO/Rh type and screen.  Procedure                               Abnormality         Status                     ---------                               -----------         ------                     Adult Type and Screen[[id number]]                            Final result                 Please view results for these tests on the individual orders.   [place] Draw    Narrative    The following orders were created for panel order [place] Draw.  Procedure                               Abnormality         Status                     ---------                               -----------         ------                     Extra Blue Top Tube[[id number]]                              Final result               Extra Red Top Tube[[id number]]                               Final result               Extra Green Top (Lithium...[[id number]]                      Final result               Extra Purple Top Tube[[id number]]                            Final result                 Please view results for these tests on the individual orders.   Adult Type and Screen   Result Value Ref Range    ABO/RH(D) AB POS     Antibody Screen Negative Negative    SPECIMEN EXPIRATION DATE [id number]    Extra Blue Top Tube   Result Value Ref Range    Hold Specimen JIC    Extra Red Top Tube   Result Value Ref Range    Hold Specimen JIC    Extra Green Top (Lithium Heparin) Tube   Result Value Ref Range    Hold Specimen JIC    Extra Purple Top Tube   Result Value Ref Range    Hold Specimen JIC    Comprehensive metabolic panel   Result Value Ref Range    Sodium 126 (L) 133 - 144 mmol/L    Potassium 4.5 3.4 - 5.3 mmol/L    Chloride 95 94 - 109 mmol/L    Carbon Dioxide (CO2) 19 (L) 20 - 32 mmol/L    Anion Gap 12 3 - 14 mmol/L    Urea  Nitrogen 27 7 - 30 mg/dL    Creatinine 0.92 0.52 - 1.04 mg/dL    Calcium 9.8 8.5 - 10.1 mg/dL    Glucose 103 (H) 70 - 99 mg/dL    Alkaline Phosphatase 303 (H) 40 - 150 U/L    AST 59 (H) 0 - 45 U/L    ALT 37 0 - 50 U/L    Protein Total 7.4 6.8 - 8.8 g/dL    Albumin 2.5 (L) 3.4 - 5.0 g/dL    Bilirubin Total 6.6 (H) 0.2 - 1.3 mg/dL    GFR Estimate 69 >60 mL/min/1.73m2   CBC with platelets differential    Narrative    The following orders were created for panel order CBC with platelets differential.  Procedure                               Abnormality         Status                     ---------                               -----------         ------                     CBC with platelets and d...[[id number]]  Abnormal            Final result                 Please view results for these tests on the individual orders.   CBC with platelets and differential   Result Value Ref Range    WBC Count 13.2 (H) 4.0 - 11.0 10e3/uL    RBC Count 3.55 (L) 3.80 - 5.20 10e6/uL    Hemoglobin 10.8 (L) 11.7 - 15.7 g/dL    Hematocrit 31.5 (L) 35.0 - 47.0 %    MCV 89 78 - 100 fL    MCH 30.4 26.5 - 33.0 pg    MCHC 34.3 31.5 - 36.5 g/dL    RDW 15.3 (H) 10.0 - 15.0 %    Platelet Count 318 150 - 450 10e3/uL    % Neutrophils 87 %    % Lymphocytes 5 %    % Monocytes 6 %    % Eosinophils 0 %    % Basophils 0 %    % Immature Granulocytes 2 %    NRBCs per 100 WBC 0 <1 /100    Absolute Neutrophils 11.5 (H) 1.6 - 8.3 10e3/uL    Absolute Lymphocytes 0.6 (L) 0.8 - 5.3 10e3/uL    Absolute Monocytes 0.8 0.0 - 1.3 10e3/uL    Absolute Eosinophils 0.0 0.0 - 0.7 10e3/uL    Absolute Basophils 0.1 0.0 - 0.2 10e3/uL    Absolute Immature Granulocytes 0.2 (H) <=0.0 10e3/uL    Absolute NRBCs 0.0 10e3/uL   INR   Result Value Ref Range    INR 1.24 (H) 0.85 - 1.15   Partial thromboplastin time   Result Value Ref Range    aPTT 32 22 - 38 Seconds   Fibrinogen activity   Result Value Ref Range    Fibrinogen Activity 661 (H) 170 - 490 mg/dL   Magnesium level   Result  Value Ref Range    Magnesium 2.3 1.6 - 2.3 mg/dL   Phosphorus level   Result Value Ref Range    Phosphorus 3.5 2.5 - 4.5 mg/dL   Amylase   Result Value Ref Range    Amylase 81 30 - 110 U/L   ABO/Rh type and screen *Canceled*    Narrative    The following orders were created for panel order ABO/Rh type and screen.  Procedure                               Abnormality         Status                     ---------                               -----------         ------                     Adult Type and Screen[[id number]]                                                         Please view results for these tests on the individual orders.   Asymptomatic COVID-19 Virus (Coronavirus) by PCR Nasopharyngeal    Specimen: Nasopharyngeal; Swab   Result Value Ref Range    SARS CoV2 PCR Negative Negative, Testing sent to reference lab. Results will be returned via unsolicited result    Narrative    Testing was performed using the Xpert Xpress SARS-CoV-2 Assay on the  Cepheid Gene-Xpert Instrument Systems. Additional information about  this Emergency Use Authorization (EUA) assay can be found via the Lab  Guide. This test should be ordered for the detection of SARS-CoV-2 in  individuals who meet SARS-CoV-2 clinical and/or epidemiological  criteria. Test performance is unknown in asymptomatic patients. This  test is for in vitro diagnostic use under the FDA EUA for  laboratories certified under CLIA to perform high complexity testing.  This test has not been FDA cleared or approved. A negative result  does not rule out the presence of PCR inhibitors in the specimen or  target RNA in concentration below the limit of detection for the  assay. The possibility of a false negative should be considered if  the patient's recent exposure or clinical presentation suggests  COVID-19. This test was validated by the [facility]. This laboratory is certified under  the Clinical Laboratory  Improvement Amendments of 1988 (CLIA-88) as  qualified to perform high complexity laboratory testing.     Head CT w/o contrast    Narrative    CT HEAD W/O CONTRAST 11/17/2021 3:00 PM    History: Mental status change, unknown cause     Comparison: 10/25/2021    Technique: Using multidetector thin collimation helical acquisition  technique, axial, coronal and sagittal CT images from the skull base  to the vertex were obtained without intravenous contrast.    Findings: There is no intracranial hemorrhage, mass effect, or midline  shift. Gray/white matter differentiation in both cerebral hemispheres  is preserved. Ventricles are proportionate to the cerebral sulci. The  basal cisterns are clear.    The bony calvaria and the bones of the skull base are normal. The  visualized portions of the paranasal sinuses and mastoid air cells are  clear. Multiple scalp epidermal inclusion cysts, the largest is  calcified at the left base of the occiput.    Atherosclerotic calcifications of the bilateral left greater than  right carotid siphons as well as vertebral arteries.      Impression    Impression:  No acute intracranial pathology.     I have personally reviewed the examination and initial interpretation  and I agree with the findings.    TAY CAMPUZANO MD         SYSTEM ID:  [id number]   GI Hepatology Adult IP Consult: Patient to be seen: Routine within 24 hrs; Call back #: [id number]; Here with AMS, has offer for DDLT potentially tomorrow.; Consultant may enter orders: Yes; Requesting provider? Attending physician; Name: Ricardo Bob MD     11/17/2021  4:41 PM  [facility]    Hepatology consult note      Consult requested by Ms. Jason NP     Primary hepatologist: Dr. Leventhal      56 year old woman with PSC, cirrhosis decompensated by ascites,   EV, UC (not on medications) and a pancreatic mucinous   cystadenoma.      Chief complaint: Leukocytosis and  AMS    HPI:  Ms. Nava is a 56-year-old woman with PSC, cirrhosis   decompensated by ascites, HE. also EV, UC (not on medications).    Hepatology consulted for AMS with possible DDLT on 11/18/2021.    She has a history of decompensated cirrhosis (ascites, HE) in the   setting of PSC which she follows with Dr. Leventhal.  She is   active on the transplant list.    She had a recent admission back in August when she was coming in   to get a liver transplant but developed V-tach or WCT and became   unstable and the transplant was aborted.  In the interim, LHC was   normal and she has been getting weekly paracentesis.    Today, she was somnolent after a nap and her  recommend   she brought in to the ED.  While en route, they got a call saying   a liver became available for her.    Currently, she endorses some mild somnolence.  No BM today.    Normally 2-3/day.      WBC was 13.2 but has been stable since 11/8.  Hgb 10.8, ,   INR 1.24, Ammonia 74.  Creatinine and LFTs are ordered but not   yet done.  No CXR yet.    Denies any fever, chills or other symptoms.  No family hx of PSC   or UC.      Medical hx Surgical hx   Past Medical History:   Diagnosis Date     Ascites      Biliary cirrhosis (H)      Cholangitis, sclerosing      Cirrhosis of liver with ascites (H) 3/3/2021     Hearing loss of left ear      History of low potassium      Hyperlipidemia      Hypertension      SBP (spontaneous bacterial peritonitis) (H) 4/30/2021     Sjogren's syndrome (H)      Ulcerative colitis (H)      Ulcerative pancolitis (H)       Past Surgical History:   Procedure Laterality Date     CV CORONARY ANGIOGRAM N/A 8/25/2021    Procedure: Coronary Angiogram with possible intervention;    Surgeon: David Wilhelm MD;  Location:  HEART CARDIAC   CATH LAB     ENDOSCOPIC RETROGRADE CHOLANGIOPANCREATOGRAM N/A 6/25/2021    Procedure: ENDOSCOPIC RETROGRADE CHOLANGIOPANCREATOGRAPHY with   ballon sweep of bile ducts for stones,  balloon dilation of bile   ducts and bile duct stent placement;  Surgeon: Gregory Gabriel MD;    Location: [facility]     ENDOSCOPIC RETROGRADE CHOLANGIOPANCREATOGRAM N/A 7/22/2021    Procedure: ENDOSCOPIC RETROGRADE CHOLANGIOPANCREATOGRAPHY STONE   REMOVAL, DILATION AND STENT PLACEMENT;  Surgeon: Gregory Gabriel MD;  Location: [facility]     ENDOSCOPIC RETROGRADE CHOLANGIOPANCREATOGRAPHY, EXCHANGE   TUBE/STENT N/A 05/05/2021    Procedure: ENDOSCOPIC RETROGRADE CHOLANGIOPANCREATOGRAPHY WITH   STENT EXCHANGE, STONE EXTRACTION, AND DILATION;  Surgeon: Gregory Gabriel MD;  [facility]     ENDOSCOPIC RETROGRADE CHOLANGIOPANCREATOGRAPHY, EXCHANGE   TUBE/STENT N/A 5/10/2021    Procedure: ENDOSCOPIC RETROGRADE CHOLANGIOPANCREATOGRAPHY with   biliary dilation, stone removal, stent exchange;  Surgeon: Gregory Gabriel MD;  [facility]     ENDOSCOPIC RETROGRADE CHOLANGIOPANCREATOGRAPHY, EXCHANGE   TUBE/STENT N/A 6/10/2021    Procedure: ENDOSCOPIC RETROGRADE CHOLANGIOPANCREATOGRAPHY, WITH   biliary stent exchange, stone removal;  Surgeon: Woodrow Lott MD;  [facility]: [facility]     ENDOSCOPIC RETROGRADE CHOLANGIOPANCREATOGRAPHY, EXCHANGE   TUBE/STENT N/A 8/30/2021    Procedure: ENDOSCOPIC RETROGRADE CHOLANGIOPANCREATOGRAPHY with   biliary dilation, stone removal, stent exchange;  Surgeon: Gregory Gabriel MD;  Location: [facility]     ENDOSCOPIC RETROGRADE CHOLANGIOPANCREATOGRAPHY, EXCHANGE   TUBE/STENT N/A 10/1/2021    Procedure: ENDOSCOPIC RETROGRADE CHOLANGIOPANCREATOGRAPHY with   balloon sweep of bile ducts, bile duct stent exchanged;  Surgeon:   Gregory Gabriel MD;  Location: [facility]     ESOPHAGOSCOPY, GASTROSCOPY, DUODENOSCOPY (EGD), COMBINED N/A   9/4/2021    Procedure: ESOPHAGOGASTRODUODENOSCOPY (EGD);  Surgeon:   Leventhal, Thomas Michael, MD;  Location: [facility]     ESOPHAGOSCOPY, GASTROSCOPY, DUODENOSCOPY (EGD), COMBINED N/A   10/1/2021    Procedure: ESOPHAGOGASTRODUODENOSCOPY (EGD) with varices   banding;  Surgeon:  Gregory Gabriel MD;  Location: [facility]     GYN SURGERY      bilat fallopian tubes and ovaries removed     PICC DOUBLE LUMEN PLACEMENT Right 2021    42cm (2cm external), Lateral brachial vein     TRANSPLANT LIVER RECIPIENT  DONOR N/A 2021    Procedure: Opening of abdomen, Abdominal Exploration and aborted   liver transplant.;  Surgeon: Ernesto Schmitt MD;  Location:   [facility]          Medications  Prior to Admission medications    Medication Sig Start Date End Date Taking? Authorizing Provider   ciprofloxacin (CIPRO) 500 MG tablet Take 1 tablet (500 mg) by   mouth every 24 hours 10/29/21   Leventhal, Thomas Michael, MD   furosemide (LASIX) 20 MG tablet Take 1 tablet (20 mg) by mouth   Take 2 tablets in the morning and 1 tablet 6 hours later 11/15/21     Tila Nevarez NP   lactulose (CEPHULAC) 10 GM packet Take 2 packets (20 g) by mouth   2 times daily (Titrate up to 4 times daily if not having at least   2 bowel movements daily) 21   Leventhal, Thomas Michael, MD   multivitamin (CENTRUM SILVER) tablet Take 1 tablet by mouth daily      Unknown, Entered By History   omeprazole (PRILOSEC) 20 MG DR capsule Take 2 capsules (40 mg) by   mouth 2 times daily 10/29/21   Leventhal, Thomas Michael, MD   ondansetron (ZOFRAN-ODT) 4 MG ODT tab Take 1 tablet (4 mg) by   mouth every 8 hours as needed for nausea 21   Tila Nevarez NP   spironolactone (ALDACTONE) 50 MG tablet Take 1 tablet (50 mg) by   mouth Take 2 tablets in the morning and 1 tablet 6 hours later   11/15/21   Tila Nevarez NP   sucralfate (CARAFATE) 1 GM tablet Take 1 g by mouth 4 times daily    10/1/21   Reported, Patient   ursodiol (ACTIGALL) 300 MG capsule Take 1 capsule (300 mg) by   mouth 2 times daily 21   Leventhal, Thomas Michael, MD       Allergies  Allergies   Allergen Reactions     Diagnostic X-Ray Materials Hives     PATIENT HAD HIVE REACTION AFTER ADMINISTERING CT CONTRAST DYE.       Contrast  "Dye        Family hx Social hx   Family History   Problem Relation Age of Onset     Cancer Mother         angiosarcoma     Coronary Artery Disease Early Onset Father         MI age 46     Heart Transplant Father      Liver Disease No family hx of      Ulcerative Colitis No family hx of      Crohn's Disease No family hx of       Social History     Tobacco Use     Smoking status: Never Smoker     Smokeless tobacco: Never Used   Vaping Use     Vaping Use: Never used   Substance Use Topics     Alcohol use: No     Comment: Last drink was 2017     Drug use: No          Review of systems  A 10-point review of systems was negative.    Examination  /75   Pulse 77   Temp 97.6  F (36.4  C) (Oral)   Resp 15     Ht 1.651 m (5' 5\")   Wt 57.6 kg (127 lb)   LMP  (LMP   Unknown)   SpO2 100%   BMI 21.13 kg/m    Body mass index is 21.13 kg/m .    Constitutional: Frail appearing woman, jaundiced, chronically ill   appearing  HEENT: conjunctival icterus  CV: Normal rate.  No edema.  Respiratory: Unlabored breathing  Abdomen:   Mild distension, non-tender  Skin: jaundiced  Neuro: Alert, moves all extremities, no asterixis.  Alert and   oriented x3.  Mild slowing / Grade I HE  Psych: Normal affect, answers questions appropriately.    Laboratory  WBC was 13.2 but has been stable since 11/8.  Hgb 10.8, ,   INR 1.24, Ammonia 74.  Creatinine and LFTs are ordered but not   yet done.    MELD-Na score: 25 at 11/17/2021  2:01 PM  MELD score: 16 at 11/17/2021  2:01 PM  Calculated from:  Serum Creatinine: 0.92 mg/dL (Using min of 1 mg/dL) at 11/17/2021    2:01 PM  Serum Sodium: 126 mmol/L at 11/17/2021  2:01 PM  Total Bilirubin: 6.6 mg/dL at 11/17/2021  2:01 PM  INR(ratio): 1.24 at 11/17/2021  2:01 PM  Age: 56 years      Radiology  CT head without contrast 11/17/2021  Impression:  No acute intracranial pathology.     Assessment  Ms. Nava is a 56-year-old woman with PSC, cirrhosis   decompensated by ascites, EV, UC (not on " medications).    Hepatology consulted for AMS with possible DDLT on 11/18/2021.    She appears well, though mildly slow with likely Grade I HE.    This is in the context of napping / less time for lactulose and   no BM today.  No fever, chills or signs of infection.    Given the plan for DDLT tomorrow, we would recommend checking   broad infectious studies (CXR / UCx / Blood cultures / Diagnostic   paracentesis if ascites and gram stain and cultures) though we do   not suspect she is infected.  Her WBC is stable from 11/8 and   actually lower than it has been in the past.      Unless there are any surprises, from our perspective she should   be OK to proceed with DDLT tomorrow.  I spoke with James Salazar (txp coordinator) and he confirmed her pre-transplant   evaluation is complete with nothing further needed.    #1 Grade I Hepatic encephalopathy  #2 Decompensated cirrhosis (ascites, HE) due to PSC (MELD 16 /   MELD-Na 25)  #3 UC not on medications  #4 Leukocytosis  #5 Ascites    Plan:  -- Please send CXR / Urine cultures / Diagnostic paracentesis   with cell count, gram stain, differential, cultures, protein,   albumin / Blood cultures  -- Assuming no infectious work up returns positive by tomorrow,   OK to proceed with DDLT from hepatology perspective  -- Lactulose per [place] protocol and her mild slowing is   likely Grade I HE  -- If taking PO, would recommend 2g salt restriction  -- Continue SBP ppx until transplanted  -- Hepatology will follow    Ricardo Novak MD  Gastroenterology Fellow, PGY-5    Staffed with attending, Dr. Leventhal.       ABO/Rh type and screen *Canceled*    Narrative    The following orders were created for panel order ABO/Rh type and screen.  Procedure                               Abnormality         Status                     ---------                               -----------         ------                       Please view results for these tests on the individual  orders.   EKG 12-lead, tracing only   Result Value Ref Range    Systolic Blood Pressure  mmHg    Diastolic Blood Pressure  mmHg    Ventricular Rate 71 BPM    Atrial Rate 71 BPM    OK Interval 152 ms    QRS Duration 92 ms     ms    QTc 465 ms    P Axis 2 degrees    R AXIS 98 degrees    T Axis 3 degrees    Interpretation ECG       Sinus rhythm with sinus arrhythmia  Rightward axis  Incomplete right bundle branch block  Borderline ECG     Internal Medicine Procedure Team ADULT IP Consult [facility] - Paracentesis    Narrative    Kamari Villalobos MD     11/17/2021  5:01 PM  Consult and Procedure Service - Procedure Note    Attending: Kamari Villalobos MD  Resident: n/a  Indication: encephelopathy  Risk Assessment: High  Pre-procedure diagnosis: Ascites  Post-procedure diagnosis: same  Procedure name: Diagnostic para    The risks and benefits of the procedure were explained to the   patient and her daughter who expressed understanding and opted to   proceed.  Consent was obtained from the daughter and placed in   the chart.  A time out was performed.  An area of ascites was   located with ultrasound and marked in the right lower quadrant;   the area was prepped and draped in the usual sterile fashion.  3   ml of 1% lidocaine was instilled with a 22 gauge needle and   ascites located under real-time guidance. 10 ml of yellow colored   fluid was removed and sent for analysis and the area dressed.     Patient tolerated the procedure well. Please contact the Consult   and Procedure Service if any complications or concerns arise.     DOS:  11/17/21           POC US Guide for Paracentesis    Impression    POCUS Abdomen    Indication: Evaluate for ascites for safe site for paracentesis    Findings:  RLQ: Large volume ascites  needle tip was shown at peritoneum before entry within fluid pocket of abdominal cavity.      Cell count with differential fluid    Narrative    The following orders were created for panel order Cell  count with differential fluid.  Procedure                               Abnormality         Status                     ---------                               -----------         ------                     Cell Count Body Fluid[[id number]]                            In process                   Please view results for these tests on the individual orders.   XR Chest 2 Views    Narrative    Exam: XR CHEST 2 VW, 11/17/2021 5:32 PM    Indication: Pre-op DDLT per protocol    Comparison: 11/6/2021    Findings:   Cardiac silhouette is normal size. Low lung volumes. No focal airspace  opacities. No pneumothorax. No pleural effusion. No acute osseous  abnormalities. Upper abdomen unremarkable.      Impression    Impression: No focal airspace opacities.    I have personally reviewed the examination and initial interpretation  and I agree with the findings.    KADIE PRITCHARD MD         SYSTEM ID:  [id number]

## 2021-11-17 NOTE — CONSULTS
[facility]    Hepatology consult note      Consult requested by Ms. Jason NP     Primary hepatologist: Dr. Leventhal      56 year old woman with PSC, cirrhosis decompensated by ascites, EV, UC (not on medications) and a pancreatic mucinous cystadenoma.      Chief complaint: Leukocytosis and AMS    HPI:  Ms. Nava is a 56-year-old woman with PSC, cirrhosis decompensated by ascites, HE. also EV, UC (not on medications).  Hepatology consulted for AMS with possible DDLT on 11/18/2021.    She has a history of decompensated cirrhosis (ascites, HE) in the setting of PSC which she follows with Dr. Leventhal.  She is active on the transplant list.    She had a recent admission back in August when she was coming in to get a liver transplant but developed V-tach or WCT and became unstable and the transplant was aborted.  In the interim, LHC was normal and she has been getting weekly paracentesis.    Today, she was somnolent after a nap and her  recommend she brought in to the ED.  While en route, they got a call saying a liver became available for her.    Currently, she endorses some mild somnolence.  No BM today.  Normally 2-3/day.      WBC was 13.2 but has been stable since 11/8.  Hgb 10.8, , INR 1.24, Ammonia 74.  Creatinine and LFTs are ordered but not yet done.  No CXR yet.    Denies any fever, chills or other symptoms.  No family hx of PSC or UC.      Medical hx Surgical hx   Past Medical History:   Diagnosis Date     Ascites      Biliary cirrhosis (H)      Cholangitis, sclerosing      Cirrhosis of liver with ascites (H) 3/3/2021     Hearing loss of left ear      History of low potassium      Hyperlipidemia      Hypertension      SBP (spontaneous bacterial peritonitis) (H) 4/30/2021     Sjogren's syndrome (H)      Ulcerative colitis (H)      Ulcerative pancolitis (H)       Past Surgical History:   Procedure Laterality Date     CV CORONARY ANGIOGRAM N/A 8/25/2021    Procedure:  Coronary Angiogram with possible intervention;  Surgeon: David Wilhelm MD;  Location:  [facility]     ENDOSCOPIC RETROGRADE CHOLANGIOPANCREATOGRAM N/A 6/25/2021    Procedure: ENDOSCOPIC RETROGRADE CHOLANGIOPANCREATOGRAPHY with ballon sweep of bile ducts for stones, balloon dilation of bile ducts and bile duct stent placement;  Surgeon: Gregory Gabriel MD;  Location: [facility] OR     ENDOSCOPIC RETROGRADE CHOLANGIOPANCREATOGRAM N/A 7/22/2021    Procedure: ENDOSCOPIC RETROGRADE CHOLANGIOPANCREATOGRAPHY STONE REMOVAL, DILATION AND STENT PLACEMENT;  Surgeon: Gregory Gabriel MD;  Location:  OR     ENDOSCOPIC RETROGRADE CHOLANGIOPANCREATOGRAPHY, EXCHANGE TUBE/STENT N/A 05/05/2021    Procedure: ENDOSCOPIC RETROGRADE CHOLANGIOPANCREATOGRAPHY WITH STENT EXCHANGE, STONE EXTRACTION, AND DILATION;  Surgeon: Gregory Gabriel MD;  Location: [facility] OR     ENDOSCOPIC RETROGRADE CHOLANGIOPANCREATOGRAPHY, EXCHANGE TUBE/STENT N/A 5/10/2021    Procedure: ENDOSCOPIC RETROGRADE CHOLANGIOPANCREATOGRAPHY with biliary dilation, stone removal, stent exchange;  Surgeon: Gregory Gabriel MD;  Location: [facility]     ENDOSCOPIC RETROGRADE CHOLANGIOPANCREATOGRAPHY, EXCHANGE TUBE/STENT N/A 6/10/2021    Procedure: ENDOSCOPIC RETROGRADE CHOLANGIOPANCREATOGRAPHY, WITH biliary stent exchange, stone removal;  Surgeon: Woodrow Lott MD;  Location: [facility] OR     ENDOSCOPIC RETROGRADE CHOLANGIOPANCREATOGRAPHY, EXCHANGE TUBE/STENT N/A 8/30/2021    Procedure: ENDOSCOPIC RETROGRADE CHOLANGIOPANCREATOGRAPHY with biliary dilation, stone removal, stent exchange;  Surgeon: Gregory Gabriel MD;  Location: [facility]     ENDOSCOPIC RETROGRADE CHOLANGIOPANCREATOGRAPHY, EXCHANGE TUBE/STENT N/A 10/1/2021    Procedure: ENDOSCOPIC RETROGRADE CHOLANGIOPANCREATOGRAPHY with balloon sweep of bile ducts, bile duct stent exchanged;  Surgeon: Gregory Gabriel MD;  Location: [facility]     ESOPHAGOSCOPY, GASTROSCOPY, DUODENOSCOPY (EGD), COMBINED N/A 9/4/2021    Procedure:  ESOPHAGOGASTRODUODENOSCOPY (EGD);  Surgeon: Leventhal, Thomas Michael, MD;  Location: [facility]     ESOPHAGOSCOPY, GASTROSCOPY, DUODENOSCOPY (EGD), COMBINED N/A 10/1/2021    Procedure: ESOPHAGOGASTRODUODENOSCOPY (EGD) with varices banding;  Surgeon: Gregory Gabriel MD;  Location: [facility] OR     GYN SURGERY      bilat fallopian tubes and ovaries removed     PICC DOUBLE LUMEN PLACEMENT Right 2021    42cm (2cm external), Lateral brachial vein     TRANSPLANT LIVER RECIPIENT  DONOR N/A 2021    Procedure: Opening of abdomen, Abdominal Exploration and aborted liver transplant.;  Surgeon: Ernesto Schmitt MD;  Location: [facility] OR          Medications  Prior to Admission medications    Medication Sig Start Date End Date Taking? Authorizing Provider   ciprofloxacin (CIPRO) 500 MG tablet Take 1 tablet (500 mg) by mouth every 24 hours 10/29/21   Leventhal, Thomas Michael, MD   furosemide (LASIX) 20 MG tablet Take 1 tablet (20 mg) by mouth Take 2 tablets in the morning and 1 tablet 6 hours later 11/15/21   Tila Nevarez NP   lactulose (CEPHULAC) 10 GM packet Take 2 packets (20 g) by mouth 2 times daily (Titrate up to 4 times daily if not having at least 2 bowel movements daily) 21   Leventhal, Thomas Michael, MD   multivitamin (CENTRUM SILVER) tablet Take 1 tablet by mouth daily    Unknown, Entered By History   omeprazole (PRILOSEC) 20 MG DR capsule Take 2 capsules (40 mg) by mouth 2 times daily 10/29/21   Leventhal, Thomas Michael, MD   ondansetron (ZOFRAN-ODT) 4 MG ODT tab Take 1 tablet (4 mg) by mouth every 8 hours as needed for nausea 21   Tila Nevarez NP   spironolactone (ALDACTONE) 50 MG tablet Take 1 tablet (50 mg) by mouth Take 2 tablets in the morning and 1 tablet 6 hours later 11/15/21   Tila Nevarez NP   sucralfate (CARAFATE) 1 GM tablet Take 1 g by mouth 4 times daily  10/1/21   Reported, Patient   ursodiol (ACTIGALL) 300 MG capsule Take 1 capsule (300 mg) by mouth 2  "times daily 6/17/21   Leventhal, Thomas Michael, MD       Allergies  Allergies   Allergen Reactions     Diagnostic X-Ray Materials Hives     PATIENT HAD HIVE REACTION AFTER ADMINISTERING CT CONTRAST DYE.       Contrast Dye        Family hx Social hx   Family History   Problem Relation Age of Onset     Cancer Mother         angiosarcoma     Coronary Artery Disease Early Onset Father         MI age 46     Heart Transplant Father      Liver Disease No family hx of      Ulcerative Colitis No family hx of      Crohn's Disease No family hx of       Social History     Tobacco Use     Smoking status: Never Smoker     Smokeless tobacco: Never Used   Vaping Use     Vaping Use: Never used   Substance Use Topics     Alcohol use: No     Comment: Last drink was 2017     Drug use: No          Review of systems  A 10-point review of systems was negative.    Examination  /75   Pulse 77   Temp 97.6  F (36.4  C) (Oral)   Resp 15   Ht 1.651 m (5' 5\")   Wt 57.6 kg (127 lb)   LMP  (LMP Unknown)   SpO2 100%   BMI 21.13 kg/m    Body mass index is 21.13 kg/m .    Constitutional: Frail appearing woman, jaundiced, chronically ill appearing  HEENT: conjunctival icterus  CV: Normal rate.  No edema.  Respiratory: Unlabored breathing  Abdomen:   Mild distension, non-tender  Skin: jaundiced  Neuro: Alert, moves all extremities, no asterixis.  Alert and oriented x3.  Mild slowing / Grade I HE  Psych: Normal affect, answers questions appropriately.    Laboratory  WBC was 13.2 but has been stable since 11/8.  Hgb 10.8, , INR 1.24, Ammonia 74.  Creatinine and LFTs are ordered but not yet done.    MELD-Na score: 25 at 11/17/2021  2:01 PM  MELD score: 16 at 11/17/2021  2:01 PM  Calculated from:  Serum Creatinine: 0.92 mg/dL (Using min of 1 mg/dL) at 11/17/2021  2:01 PM  Serum Sodium: 126 mmol/L at 11/17/2021  2:01 PM  Total Bilirubin: 6.6 mg/dL at 11/17/2021  2:01 PM  INR(ratio): 1.24 at 11/17/2021  2:01 PM  Age: 56 " years      Radiology  CT head without contrast 11/17/2021  Impression:  No acute intracranial pathology.     Assessment  Ms. Nava is a 56-year-old woman with PSC, cirrhosis decompensated by ascites, EV, UC (not on medications).  Hepatology consulted for AMS with possible DDLT on 11/18/2021.    She appears well, though mildly slow with likely Grade I HE.  This is in the context of napping / less time for lactulose and no BM today.  No fever, chills or signs of infection.    Given the plan for DDLT tomorrow, we would recommend checking broad infectious studies (CXR / UCx / Blood cultures / Diagnostic paracentesis if ascites and gram stain and cultures) though we do not suspect she is infected.  Her WBC is stable from 11/8 and actually lower than it has been in the past.      Unless there are any surprises, from our perspective she should be OK to proceed with DDLT tomorrow.  I spoke with James Salazar (txp coordinator) and he confirmed her pre-transplant evaluation is complete with nothing further needed.    #1 Grade I Hepatic encephalopathy  #2 Decompensated cirrhosis (ascites, HE) due to PSC (MELD 16 / MELD-Na 25)  #3 UC not on medications  #4 Leukocytosis  #5 Ascites    Plan:  -- Please send CXR / Urine cultures / Diagnostic paracentesis with cell count, gram stain, differential, cultures, protein, albumin / Blood cultures  -- Assuming no infectious work up returns positive by tomorrow, OK to proceed with DDLT from hepatology perspective  -- Lactulose per [place] protocol and her mild slowing is likely Grade I HE  -- If taking PO, would recommend 2g salt restriction  -- Continue SBP ppx until transplanted  -- Hepatology will follow    Ricardo Novak MD  Gastroenterology Fellow, PGY-5    Staffed with attending, Dr. Leventhal.

## 2021-11-17 NOTE — ED PROVIDER NOTES
"    [facility] ([facility])  11/17/21  History     Chief Complaint   Patient presents with     Altered Mental Status     The history is provided by the patient and medical records.     Berta Nava is a 56 year old female with a past medical history significant for primary sclerosing cholangitis, cirrhosis complicated by ascites requiring paracentesis, pancreatic mucinous cystoadenoma, ulcerative colitis, microscopic colitis and hypertension who presents to the Emergency Department accompanied by her daughter for evaluation of confusion and altered mental status which began this morning.  Of note, patient does not have her hearing aids in. Her daughter states that they just received a call that the patient has a liver for liver transplant.  Patient just received a paracentesis yesterday.  She has been suffering increased confusion today.  This is the third episode of confusion over the past 3 weeks.  During her 2 prior episodes of confusion she has had high ammonia levels.  Per patient's daughter, she is unable to answer corrections correctly and is acting increasingly confused, using the pen as a straw on her glass.  She denies headache, black/tarry stools, hematemesis, abdominal pain, fever, or recent falls.  Patient is able to identify that she as at the [facility].  Patient is also able to state that her liver just became available for her tonight.  And reports that she \"feels lyle\". When asked what month it was patient stated that it was November but then later stated it was January. When asked what year it is, patient stated that it is December.  She denies recent Covid exposure.    Per review of the patient's medical record, patient was admitted to [facility] from 11/6/2021-11/9/2021 for evaluation of altered mental status.  Patient received therapeutic paracentesis with removal of more than 5 L and infusion of albumin.  Her " decompensation was multifactorial with volume depletion from vomiting, skipping lactulose doses, fluid shifting with large ascites but not infection, bleeding or any other stressor found. Patient's ammonia level was 110 on admission.     Per further review, patient's most recent head CT was on 10/25/2021 with results below:    Head CT 10/25/21  IMPRESSION:  1.  No evidence of acute intracranial hemorrhage or mass effect.  2.  Partially calcified mass overlying the left occipital calvarium  measuring 1.1 x 1.8 cm, most likely representing a sebaceous cyst.  There are additional subcutaneous lesions within the posterior scalp  which also most likely represent sebaceous cysts. Clinical correlation  is advised.    Past Medical History:   Diagnosis Date     Ascites      Biliary cirrhosis (H)      Cholangitis, sclerosing      Cirrhosis of liver with ascites (H) 3/3/2021     Hearing loss of left ear     wears a hearing aide     History of low potassium      Hyperlipidemia      Hypertension      SBP (spontaneous bacterial peritonitis) (H) 4/30/2021     Sjogren's syndrome (H)      Ulcerative colitis (H)      Ulcerative pancolitis (H)        Past Surgical History:   Procedure Laterality Date     CV CORONARY ANGIOGRAM N/A 8/25/2021    Procedure: Coronary Angiogram with possible intervention;  Surgeon: David Wilhelm MD;  Location:  HEART CARDIAC CATH LAB     ENDOSCOPIC RETROGRADE CHOLANGIOPANCREATOGRAM N/A 6/25/2021    Procedure: ENDOSCOPIC RETROGRADE CHOLANGIOPANCREATOGRAPHY with ballon sweep of bile ducts for stones, balloon dilation of bile ducts and bile duct stent placement;  Surgeon: Gregory Gabriel MD;  Location:  OR     ENDOSCOPIC RETROGRADE CHOLANGIOPANCREATOGRAM N/A 7/22/2021    Procedure: ENDOSCOPIC RETROGRADE CHOLANGIOPANCREATOGRAPHY STONE REMOVAL, DILATION AND STENT PLACEMENT;  Surgeon: Gregory Gabriel MD;  Location:  OR     ENDOSCOPIC RETROGRADE CHOLANGIOPANCREATOGRAPHY, EXCHANGE TUBE/STENT N/A  2021    Procedure: ENDOSCOPIC RETROGRADE CHOLANGIOPANCREATOGRAPHY WITH STENT EXCHANGE, STONE EXTRACTION, AND DILATION;  Surgeon: Gregory Gabriel MD;  Location: [facility] OR     ENDOSCOPIC RETROGRADE CHOLANGIOPANCREATOGRAPHY, EXCHANGE TUBE/STENT N/A 5/10/2021    Procedure: ENDOSCOPIC RETROGRADE CHOLANGIOPANCREATOGRAPHY with biliary dilation, stone removal, stent exchange;  Surgeon: Gregory Gabriel MD;  Location: [facility] OR     ENDOSCOPIC RETROGRADE CHOLANGIOPANCREATOGRAPHY, EXCHANGE TUBE/STENT N/A 6/10/2021    Procedure: ENDOSCOPIC RETROGRADE CHOLANGIOPANCREATOGRAPHY, WITH biliary stent exchange, stone removal;  Surgeon: Woodrow Lott MD;  Location: [facility] OR     ENDOSCOPIC RETROGRADE CHOLANGIOPANCREATOGRAPHY, EXCHANGE TUBE/STENT N/A 2021    Procedure: ENDOSCOPIC RETROGRADE CHOLANGIOPANCREATOGRAPHY with biliary dilation, stone removal, stent exchange;  Surgeon: Gregory Gabriel MD;  Location: [facility] OR     ENDOSCOPIC RETROGRADE CHOLANGIOPANCREATOGRAPHY, EXCHANGE TUBE/STENT N/A 10/1/2021    Procedure: ENDOSCOPIC RETROGRADE CHOLANGIOPANCREATOGRAPHY with balloon sweep of bile ducts, bile duct stent exchanged;  Surgeon: Gregory Gabriel MD;  Location: [facility] OR     ESOPHAGOSCOPY, GASTROSCOPY, DUODENOSCOPY (EGD), COMBINED N/A 2021    Procedure: ESOPHAGOGASTRODUODENOSCOPY (EGD);  Surgeon: Leventhal, Thomas Michael, MD;  Location: [facility] GI     ESOPHAGOSCOPY, GASTROSCOPY, DUODENOSCOPY (EGD), COMBINED N/A 10/1/2021    Procedure: ESOPHAGOGASTRODUODENOSCOPY (EGD) with varices banding;  Surgeon: Gregory Gabriel MD;  Location: [facility] OR     GYN SURGERY      bilat fallopian tubes and ovaries removed     PICC DOUBLE LUMEN PLACEMENT Right 2021    42cm (2cm external), Lateral brachial vein     TRANSPLANT LIVER RECIPIENT  DONOR N/A 2021    Procedure: Opening of abdomen, Abdominal Exploration and aborted liver transplant.;  Surgeon: Ernesto Schmitt MD;  Location: [facility] OR       Family History   Problem Relation Age of Onset      Cancer Mother         angiosarcoma     Coronary Artery Disease Early Onset Father         MI age 46     Heart Transplant Father      Liver Disease No family hx of      Ulcerative Colitis No family hx of      Crohn's Disease No family hx of        Social History     Tobacco Use     Smoking status: Never Smoker     Smokeless tobacco: Never Used   Substance Use Topics     Alcohol use: No     Comment: Last drink was 2017       Current Facility-Administered Medications   Medication     acetaminophen (TYLENOL) tablet 975 mg     calcium chloride 4 g in sodium chloride 0.9 % intermittent infusion     ciprofloxacin (CIPRO) tablet 500 mg     Daily 2 GRAM acetaminophen limit, unless fulminent liver failure or transaminases greater than or equal to 300 - 400, then none     fluconazole (DIFLUCAN) intermittent infusion 400 mg in NaCl     lactulose (CEPHULAC) Packet 20 g     lactulose (CHRONULAC) solution 20 g    Or     lactulose (CHRONULAC) solution for enema prep 100 g     lidocaine (LMX4) cream     lidocaine 1 % 0.1-1 mL     methylPREDNISolone sodium succinate (solu-MEDROL) 1,000 mg in sodium chloride 0.9 % 266 mL intermittent infusion     piperacillin-tazobactam (ZOSYN) 3.375 g vial to attach to  mL bag    Followed by     piperacillin-tazobactam (ZOSYN) 2.25 g vial to attach to  ml bag     sodium chloride (PF) 0.9% PF flush 3 mL     sodium chloride (PF) 0.9% PF flush 3 mL     Current Outpatient Medications   Medication     ciprofloxacin (CIPRO) 500 MG tablet     furosemide (LASIX) 20 MG tablet     lactulose (CEPHULAC) 10 GM packet     multivitamin (CENTRUM SILVER) tablet     omeprazole (PRILOSEC) 20 MG DR capsule     ondansetron (ZOFRAN-ODT) 4 MG ODT tab     spironolactone (ALDACTONE) 50 MG tablet     sucralfate (CARAFATE) 1 GM tablet     ursodiol (ACTIGALL) 300 MG capsule        Allergies   Allergen Reactions     Diagnostic X-Ray Materials Hives     PATIENT HAD HIVE REACTION AFTER ADMINISTERING CT CONTRAST DYE.    "    Contrast Dye      I have reviewed the Medications, Allergies, Past Medical and Surgical History, and Social History in the Epic system.    Review of Systems   Constitutional: Negative for fever.   Gastrointestinal: Negative for abdominal pain and blood in stool.        Negative for hematemesis     Neurological: Negative for headaches.   Psychiatric/Behavioral: Positive for confusion.   All other systems reviewed and are negative.    A complete review of systems was performed with pertinent positives and negatives noted in the HPI, and all other systems negative.    Physical Exam   BP: 133/75  Pulse: 77  Temp: 97.6  F (36.4  C)  Resp: 15  Height: 165.1 cm (5' 5\")  Weight: 57.6 kg (127 lb)  SpO2: 100 %      Physical Exam  General: awake, alert, patient is frail-appearing, cachectic, jaundice  Head: normal cephalic  HEENT: pupils equal, conjugate gaze intact; scleral icterus noted  Neck: Supple  CV: regular rate and rhythm without murmur  Lungs: clear to auscultation  Abd: Distended but soft, no tenderness to palpation, no guarding, no peritoneal signs  EXT: lower extremities without swelling or edema  Neuro: awake, answers patient some questions appropriately but not all.  Patient is diffusely weak on neuro exam but is symmetric in her bilateral upper and lower extremities.  Cranial nerves are intact.  No focal deficits noted   Skin: Spider hemangiomas noted on upper and lower extremities, ecchymosis noted on upper and lower extremities.  No obvious deformities.      ED Course     At 2:13 PM the patient was seen and examined by Ilya Martinez MD in Room ED03.        Procedures  Results for orders placed during the hospital encounter of 11/17/21    POC US GUIDE FOR PARACENTESIS    Impression  POCUS Abdomen    Indication: Evaluate for ascites for safe site for paracentesis    Findings:  RLQ: Large volume ascites  needle tip was shown at peritoneum before entry within fluid pocket of abdominal cavity.              " EKG Interpretation:      Interpreted by Ilya Oneill MD  Time reviewed: 15:52  Symptoms at time of EKG: altered mental status   Rhythm: normal sinus   Rate: 71 bpm  Axis: Right Axis Deviation  Ectopy: none  Conduction: incomplete bundle branch block  ST Segments/ T Waves: No ST-T wave changes  Q Waves: none  Comparison to prior: Unchanged from 11/6/21    Clinical Impression: Unchanged from previous      The medical record was reviewed and interpreted.  Current labs reviewed and interpreted.  Previous labs reviewed and interpreted.     Results for orders placed or performed during the hospital encounter of 11/17/21 (from the past 24 hour(s))   Ammonia (on ice)   Result Value Ref Range    Ammonia 78 (H) 10 - 50 umol/L   ABO/Rh type and screen    Narrative    The following orders were created for panel order ABO/Rh type and screen.  Procedure                               Abnormality         Status                     ---------                               -----------         ------                     Adult Type and Screen[[id number]]                            Final result                 Please view results for these tests on the individual orders.   [place] Draw    Narrative    The following orders were created for panel order [place] Draw.  Procedure                               Abnormality         Status                     ---------                               -----------         ------                     Extra Blue Top Tube[[id number]]                              Final result               Extra Red Top Tube[[id number]]                               Final result               Extra Green Top (Lithium...[[id number]]                      Final result               Extra Purple Top Tube[[id number]]                            Final result                 Please view results for these tests on the individual orders.   Adult Type and Screen   Result Value Ref Range    ABO/RH(D) AB POS     Antibody Screen Negative  Negative    SPECIMEN EXPIRATION DATE [id number]    Extra Blue Top Tube   Result Value Ref Range    Hold Specimen JIC    Extra Red Top Tube   Result Value Ref Range    Hold Specimen JIC    Extra Green Top (Lithium Heparin) Tube   Result Value Ref Range    Hold Specimen JIC    Extra Purple Top Tube   Result Value Ref Range    Hold Specimen JIC    Comprehensive metabolic panel   Result Value Ref Range    Sodium 126 (L) 133 - 144 mmol/L    Potassium 4.5 3.4 - 5.3 mmol/L    Chloride 95 94 - 109 mmol/L    Carbon Dioxide (CO2) 19 (L) 20 - 32 mmol/L    Anion Gap 12 3 - 14 mmol/L    Urea Nitrogen 27 7 - 30 mg/dL    Creatinine 0.92 0.52 - 1.04 mg/dL    Calcium 9.8 8.5 - 10.1 mg/dL    Glucose 103 (H) 70 - 99 mg/dL    Alkaline Phosphatase 303 (H) 40 - 150 U/L    AST 59 (H) 0 - 45 U/L    ALT 37 0 - 50 U/L    Protein Total 7.4 6.8 - 8.8 g/dL    Albumin 2.5 (L) 3.4 - 5.0 g/dL    Bilirubin Total 6.6 (H) 0.2 - 1.3 mg/dL    GFR Estimate 69 >60 mL/min/1.73m2   CBC with platelets differential    Narrative    The following orders were created for panel order CBC with platelets differential.  Procedure                               Abnormality         Status                     ---------                               -----------         ------                     CBC with platelets and d...[[id number]]  Abnormal            Final result                 Please view results for these tests on the individual orders.   CBC with platelets and differential   Result Value Ref Range    WBC Count 13.2 (H) 4.0 - 11.0 10e3/uL    RBC Count 3.55 (L) 3.80 - 5.20 10e6/uL    Hemoglobin 10.8 (L) 11.7 - 15.7 g/dL    Hematocrit 31.5 (L) 35.0 - 47.0 %    MCV 89 78 - 100 fL    MCH 30.4 26.5 - 33.0 pg    MCHC 34.3 31.5 - 36.5 g/dL    RDW 15.3 (H) 10.0 - 15.0 %    Platelet Count 318 150 - 450 10e3/uL    % Neutrophils 87 %    % Lymphocytes 5 %    % Monocytes 6 %    % Eosinophils 0 %    % Basophils 0 %    % Immature Granulocytes 2 %    NRBCs per 100 WBC 0 <1  /100    Absolute Neutrophils 11.5 (H) 1.6 - 8.3 10e3/uL    Absolute Lymphocytes 0.6 (L) 0.8 - 5.3 10e3/uL    Absolute Monocytes 0.8 0.0 - 1.3 10e3/uL    Absolute Eosinophils 0.0 0.0 - 0.7 10e3/uL    Absolute Basophils 0.1 0.0 - 0.2 10e3/uL    Absolute Immature Granulocytes 0.2 (H) <=0.0 10e3/uL    Absolute NRBCs 0.0 10e3/uL   INR   Result Value Ref Range    INR 1.24 (H) 0.85 - 1.15   Partial thromboplastin time   Result Value Ref Range    aPTT 32 22 - 38 Seconds   Fibrinogen activity   Result Value Ref Range    Fibrinogen Activity 661 (H) 170 - 490 mg/dL   Magnesium level   Result Value Ref Range    Magnesium 2.3 1.6 - 2.3 mg/dL   Phosphorus level   Result Value Ref Range    Phosphorus 3.5 2.5 - 4.5 mg/dL   Amylase   Result Value Ref Range    Amylase 81 30 - 110 U/L   ABO/Rh type and screen *Canceled*    Narrative    The following orders were created for panel order ABO/Rh type and screen.  Procedure                               Abnormality         Status                     ---------                               -----------         ------                     Adult Type and Screen[[id number]]                                                         Please view results for these tests on the individual orders.   Asymptomatic COVID-19 Virus (Coronavirus) by PCR Nasopharyngeal    Specimen: Nasopharyngeal; Swab   Result Value Ref Range    SARS CoV2 PCR Negative Negative, Testing sent to reference lab. Results will be returned via unsolicited result    Narrative    Testing was performed using the Xpert Xpress SARS-CoV-2 Assay on the  Cepheid Gene-Xpert Instrument Systems. Additional information about  this Emergency Use Authorization (EUA) assay can be found via the Lab  Guide. This test should be ordered for the detection of SARS-CoV-2 in  individuals who meet SARS-CoV-2 clinical and/or epidemiological  criteria. Test performance is unknown in asymptomatic patients. This  test is for in vitro diagnostic use under the  FDA EUA for  laboratories certified under CLIA to perform high complexity testing.  This test has not been FDA cleared or approved. A negative result  does not rule out the presence of PCR inhibitors in the specimen or  target RNA in concentration below the limit of detection for the  assay. The possibility of a false negative should be considered if  the patient's recent exposure or clinical presentation suggests  COVID-19. This test was validated by the [facility]. This laboratory is certified under  the Clinical Laboratory Improvement Amendments of 1988 (CLIA-88) as  qualified to perform high complexity laboratory testing.     Head CT w/o contrast    Narrative    CT HEAD W/O CONTRAST 11/17/2021 3:00 PM    History: Mental status change, unknown cause     Comparison: 10/25/2021    Technique: Using multidetector thin collimation helical acquisition  technique, axial, coronal and sagittal CT images from the skull base  to the vertex were obtained without intravenous contrast.    Findings: There is no intracranial hemorrhage, mass effect, or midline  shift. Gray/white matter differentiation in both cerebral hemispheres  is preserved. Ventricles are proportionate to the cerebral sulci. The  basal cisterns are clear.    The bony calvaria and the bones of the skull base are normal. The  visualized portions of the paranasal sinuses and mastoid air cells are  clear. Multiple scalp epidermal inclusion cysts, the largest is  calcified at the left base of the occiput.    Atherosclerotic calcifications of the bilateral left greater than  right carotid siphons as well as vertebral arteries.      Impression    Impression:  No acute intracranial pathology.     I have personally reviewed the examination and initial interpretation  and I agree with the findings.    TAY CAMPUZANO MD         SYSTEM ID:  [id number]   ABO/Rh type and screen *Canceled*    Narrative    The following  orders were created for panel order ABO/Rh type and screen.  Procedure                               Abnormality         Status                     ---------                               -----------         ------                       Please view results for these tests on the individual orders.   EKG 12-lead, tracing only   Result Value Ref Range    Systolic Blood Pressure  mmHg    Diastolic Blood Pressure  mmHg    Ventricular Rate 71 BPM    Atrial Rate 71 BPM    KY Interval 152 ms    QRS Duration 92 ms     ms    QTc 465 ms    P Axis 2 degrees    R AXIS 98 degrees    T Axis 3 degrees    Interpretation ECG       Sinus rhythm with sinus arrhythmia  Rightward axis  Incomplete right bundle branch block  Borderline ECG     Internal Medicine Procedure Team ADULT IP Consult [place] - Paracentesis    Narrative    Kamari Villalobos MD     11/17/2021  5:01 PM  Consult and Procedure Service - Procedure Note    Attending: Kamari Villalobos MD  Resident: n/a  Indication: encephelopathy  Risk Assessment: High  Pre-procedure diagnosis: Ascites  Post-procedure diagnosis: same  Procedure name: Diagnostic para    The risks and benefits of the procedure were explained to the   patient and her daughter who expressed understanding and opted to   proceed.  Consent was obtained from the daughter and placed in   the chart.  A time out was performed.  An area of ascites was   located with ultrasound and marked in the right lower quadrant;   the area was prepped and draped in the usual sterile fashion.  3   ml of 1% lidocaine was instilled with a 22 gauge needle and   ascites located under real-time guidance. 10 ml of yellow colored   fluid was removed and sent for analysis and the area dressed.     Patient tolerated the procedure well. Please contact the Consult   and Procedure Service if any complications or concerns arise.     DOS:  11/17/21           POC US Guide for Paracentesis    Impression    POCUS Abdomen    Indication:  Evaluate for ascites for safe site for paracentesis    Findings:  RLQ: Large volume ascites  needle tip was shown at peritoneum before entry within fluid pocket of abdominal cavity.      Cell count with differential fluid    Narrative    The following orders were created for panel order Cell count with differential fluid.  Procedure                               Abnormality         Status                     ---------                               -----------         ------                     Cell Count Body Fluid[[id number]]                            In process                   Please view results for these tests on the individual orders.   Protein fluid   Result Value Ref Range    Protein Total Fluid 1.0 g/dL    Narrative    No reference ranges have been established.  This result should be interpreted in the context of the patient's clinical condition and compared to simultaneous measurement in the patient's blood.  This is a lab developed test. It has not been cleared or approved by the FDA.   XR Chest 2 Views    Narrative    Exam: XR CHEST 2 VW, 11/17/2021 5:32 PM    Indication: Pre-op DDLT per protocol    Comparison: 11/6/2021    Findings:   Cardiac silhouette is normal size. Low lung volumes. No focal airspace  opacities. No pneumothorax. No pleural effusion. No acute osseous  abnormalities. Upper abdomen unremarkable.      Impression    Impression: No focal airspace opacities.    I have personally reviewed the examination and initial interpretation  and I agree with the findings.    KADIE PRITCHARD MD         SYSTEM ID:  [id number]     Medications   lidocaine 1 % 0.1-1 mL (has no administration in time range)   lidocaine (LMX4) cream (has no administration in time range)   sodium chloride (PF) 0.9% PF flush 3 mL (has no administration in time range)   sodium chloride (PF) 0.9% PF flush 3 mL (has no administration in time range)   fluconazole (DIFLUCAN) intermittent infusion 400 mg in NaCl (has no  administration in time range)   methylPREDNISolone sodium succinate (solu-MEDROL) 1,000 mg in sodium chloride 0.9 % 266 mL intermittent infusion (has no administration in time range)   calcium chloride 4 g in sodium chloride 0.9 % intermittent infusion (has no administration in time range)   piperacillin-tazobactam (ZOSYN) 3.375 g vial to attach to  mL bag (has no administration in time range)     Followed by   piperacillin-tazobactam (ZOSYN) 2.25 g vial to attach to  ml bag (has no administration in time range)   acetaminophen (TYLENOL) tablet 975 mg (975 mg Oral Not Given 11/17/21 1650)   lactulose (CEPHULAC) Packet 20 g (20 g Oral Not Given 11/17/21 1715)   lactulose (CHRONULAC) solution 20 g (20 g Oral or NG Tube Given 11/17/21 1628)     Or   lactulose (CHRONULAC) solution for enema prep 100 g ( Rectal See Alternative 11/17/21 1628)   Daily 2 GRAM acetaminophen limit, unless fulminent liver failure or transaminases greater than or equal to 300 - 400, then none (has no administration in time range)   ciprofloxacin (CIPRO) tablet 500 mg (has no administration in time range)             Assessments & Plan (with Medical Decision Making)   Isidoro is a 56-year-old female who presents to the emergency department with altered mental status.      On exam she has stable vital signs, has a nonfocal exam and appears mostly encephalopathic.    Differential would include things such as hepatic encephalopathy from elevated ammonia, GI bleed, electrolyte abnormality, sepsis, acute intercranial pathology such as bleed in the setting of coagulopathies versus stroke.  I think less likely stroke given the encephalopathic nature and the recurrent nature of her symptoms.    Initial evaluation will include head CT, laboratory studies and serial neurologic exams.    I have lower suspicion for infection as family notes no fevers, patient has no infectious symptoms or complaints at this time.    Patient is hyponatremic but  baseline.  Patient's ammonia was elevated.  Patient has an elevated white count but this is baseline and consistent with previous values.  INR mildly elevated.  Head CT without acute abnormalities such as bleed.  Chest x-ray with no focal airspace disease.    Given the elevated ammonia and the overall clinical picture I feel that this is likely hepatic encephalopathy.  Patient will be admitted to the liver transplant service as they do have a liver available for her.  She was admitted to their service, additional labs were ordered at their discretion.  Covid testing obtained and negative.  Patient was typed and crossed in anticipation for surgery.    I have reviewed the nursing notes.    I have reviewed the findings, diagnosis, plan and need for follow up with the patient.    New Prescriptions    No medications on file       Final diagnoses:   Encephalopathy   Hepatic encephalopathy (H)   Chronic liver failure without hepatic coma (H)       ISarai am serving as a trained medical scribe to document services personally performed by Ilya Oneill MD, based on the provider's statements to me.      IIlya MD, was physically present and have reviewed and verified the accuracy of this note documented by Sarai Brown.     Ilya Oneill MD  11/17/2021   [facility] EMERGENCY DEPARTMENT     Ilya Oneill MD  11/17/21 1130

## 2021-11-17 NOTE — CONSULTS
Consult and Procedure Service - Procedure Note    Attending: Kamari Vlilalobos MD  Resident: n/a  Indication: encephelopathy  Risk Assessment: High  Pre-procedure diagnosis: Ascites  Post-procedure diagnosis: same  Procedure name: Diagnostic para    The risks and benefits of the procedure were explained to the patient and her daughter who expressed understanding and opted to proceed.  Consent was obtained from the daughter and placed in the chart.  A time out was performed.  An area of ascites was located with ultrasound and marked in the right lower quadrant; the area was prepped and draped in the usual sterile fashion.  3 ml of 1% lidocaine was instilled with a 22 gauge needle and ascites located under real-time guidance. 10 ml of yellow colored fluid was removed and sent for analysis and the area dressed.     Patient tolerated the procedure well. Please contact the Consult and Procedure Service if any complications or concerns arise.     DOS:  11/17/21

## 2021-11-17 NOTE — ED TRIAGE NOTES
Patient came in with altered mental status that started this morning. Patient to get a liver transplant.

## 2021-11-17 NOTE — TELEPHONE ENCOUNTER
"TRANSPLANT OR REPORT    Organ: Liver  Laterality (if known): N/A  Organ Location: Local    UNOS ID: [id number]  Donor OR Time: 0200 11/18  Expected/Actual Cross Clamp Time: 0400 11/18  Expected Organ Arrival Time: 0500 11/18    Surgeon: Oskar  Time in OR: 0500  Time in 3C (N/A for LI): N/A    Recipient Details  Admission ETA: Admitted  Unit: In ED currently, 7A eventually  Isolation: No  Latex Allergy: No  : N/A  Diagnosis: ESLD    Liver Transplants  Bypass: Yes  Hemodialysis: N/A  ~ \"RENAL STAFF TEACHING SERVICE MEDICINE\" : N/A  ~ CRRT Resource Nurse: N/A  (Telephone Number for CRRT [phone])    Kidney/Panc Transplants  XM Status (Need to wait for XM?): No    Liver or KP/PA Recipients - Vessel Banking:  Donor has positive serologies for HIV/HCV/HBV: No  Donor has risk criteria for HIV/HCV/HBV: Yes      Transplant Coordinator Contact Info: Barbara [phone] till 7AM césar Rosario      Vessel Bank Information  Transplant hospitals must not store a donor s extra vessels if the donor has tested positive for any of the following:   - HIV by antibody, antigen, or nucleic acid test (VILMA)   - Hepatitis B surface antigen (HBsAg)   - Hepatitis B (HBV) by VILMA   - Hepatitis C (HCV) by antibody or VILMA     Extra vessels from donors that do not test positive for HIV, HBV, or HCV as above may be stored      "

## 2021-11-18 ENCOUNTER — APPOINTMENT (OUTPATIENT)
Dept: ULTRASOUND IMAGING | Facility: CLINIC | Age: 56
DRG: 005 | End: 2021-11-18
Attending: SURGERY
Payer: COMMERCIAL

## 2021-11-18 ENCOUNTER — ANESTHESIA (OUTPATIENT)
Dept: SURGERY | Facility: CLINIC | Age: 56
DRG: 005 | End: 2021-11-18
Payer: COMMERCIAL

## 2021-11-18 ENCOUNTER — DOCUMENTATION ONLY (OUTPATIENT)
Dept: TRANSPLANT | Facility: CLINIC | Age: 56
End: 2021-11-18
Payer: COMMERCIAL

## 2021-11-18 ENCOUNTER — APPOINTMENT (OUTPATIENT)
Dept: GENERAL RADIOLOGY | Facility: CLINIC | Age: 56
DRG: 005 | End: 2021-11-18
Payer: COMMERCIAL

## 2021-11-18 LAB
ALBUMIN SERPL-MCNC: 1.6 G/DL (ref 3.4–5)
ALBUMIN SERPL-MCNC: 1.6 G/DL (ref 3.4–5)
ALP SERPL-CCNC: 41 U/L (ref 40–150)
ALP SERPL-CCNC: 41 U/L (ref 40–150)
ALT SERPL W P-5'-P-CCNC: 200 U/L (ref 0–50)
ALT SERPL W P-5'-P-CCNC: 200 U/L (ref 0–50)
AMMONIA PLAS-SCNC: 34 UMOL/L (ref 10–50)
AMYLASE SERPL-CCNC: 31 U/L (ref 30–110)
ANION GAP SERPL CALCULATED.3IONS-SCNC: 7 MMOL/L (ref 3–14)
ANION GAP SERPL CALCULATED.3IONS-SCNC: NORMAL MMOL/L
APTT PPP: 33 SECONDS (ref 22–38)
APTT PPP: 44 SECONDS (ref 22–38)
APTT PPP: 44 SECONDS (ref 22–38)
AST SERPL W P-5'-P-CCNC: 280 U/L (ref 0–45)
AST SERPL W P-5'-P-CCNC: 280 U/L (ref 0–45)
BASE EXCESS BLDA CALC-SCNC: -0.5 MMOL/L (ref -9.6–2)
BASE EXCESS BLDA CALC-SCNC: -0.5 MMOL/L (ref -9–1.8)
BASE EXCESS BLDA CALC-SCNC: -10.1 MMOL/L (ref -9.6–2)
BASE EXCESS BLDA CALC-SCNC: -4 MMOL/L (ref -9.6–2)
BASE EXCESS BLDA CALC-SCNC: -5.5 MMOL/L (ref -9.6–2)
BASE EXCESS BLDA CALC-SCNC: -6.3 MMOL/L (ref -9.6–2)
BASE EXCESS BLDA CALC-SCNC: -6.5 MMOL/L (ref -9.6–2)
BASE EXCESS BLDA CALC-SCNC: -6.7 MMOL/L (ref -9.6–2)
BASE EXCESS BLDA CALC-SCNC: -7 MMOL/L (ref -9.6–2)
BASE EXCESS BLDA CALC-SCNC: -7.6 MMOL/L (ref -9.6–2)
BASE EXCESS BLDA CALC-SCNC: -9.8 MMOL/L (ref -9.6–2)
BASOPHILS # BLD AUTO: 0 10E3/UL (ref 0–0.2)
BASOPHILS # BLD AUTO: 0 10E3/UL (ref 0–0.2)
BASOPHILS NFR BLD AUTO: 0 %
BASOPHILS NFR BLD AUTO: 0 %
BILIRUB DIRECT SERPL-MCNC: 1.2 MG/DL (ref 0–0.2)
BILIRUB SERPL-MCNC: 3.1 MG/DL (ref 0.2–1.3)
BILIRUB SERPL-MCNC: 3.1 MG/DL (ref 0.2–1.3)
BLD PROD TYP BPU: NORMAL
BLOOD COMPONENT TYPE: NORMAL
BUN SERPL-MCNC: 14 MG/DL (ref 7–30)
BUN SERPL-MCNC: NORMAL MG/DL
CA-I BLD-MCNC: 4.1 MG/DL (ref 4.4–5.2)
CA-I BLD-MCNC: 4.3 MG/DL (ref 4.4–5.2)
CA-I BLD-MCNC: 4.5 MG/DL (ref 4.4–5.2)
CA-I BLD-MCNC: 5 MG/DL (ref 4.4–5.2)
CA-I BLD-MCNC: 5 MG/DL (ref 4.4–5.2)
CA-I BLD-MCNC: 5.1 MG/DL (ref 4.4–5.2)
CA-I BLD-MCNC: 5.1 MG/DL (ref 4.4–5.2)
CA-I BLD-MCNC: 5.3 MG/DL (ref 4.4–5.2)
CA-I BLD-MCNC: 5.5 MG/DL (ref 4.4–5.2)
CA-I BLD-MCNC: 5.5 MG/DL (ref 4.4–5.2)
CA-I BLD-MCNC: 5.6 MG/DL (ref 4.4–5.2)
CALCIUM SERPL-MCNC: 9.3 MG/DL (ref 8.5–10.1)
CALCIUM SERPL-MCNC: NORMAL MG/DL
CF REDUC 30M P MA P HEP LENFR BLD TEG: 0 % (ref 0–8)
CF REDUC 30M P MA P HEP LENFR BLD TEG: 0 % (ref 0–8)
CF REDUC 60M P MA LENFR BLD TEG: 0 % (ref 0–15)
CF REDUC 60M P MA LENFR BLD TEG: 0 % (ref 0–15)
CF REDUC 60M P MA LENFR BLD TEG: 0.7 % (ref 0–15)
CF REDUC 60M P MA LENFR BLD TEG: 1.3 % (ref 0–15)
CF REDUC 60M P MA LENFR BLD TEG: ABNORMAL %
CF REDUC 60M P MA P HEPASE LENFR BLD TEG: 0 % (ref 0–15)
CF REDUC 60M P MA P HEPASE LENFR BLD TEG: 0 % (ref 0–15)
CFT BLD TEG: 1 MINUTE (ref 1–3)
CFT BLD TEG: 1.2 MINUTE (ref 1–3)
CFT BLD TEG: 1.2 MINUTE (ref 1–3)
CFT BLD TEG: 1.4 MINUTE (ref 1–3)
CFT BLD TEG: 11.3 MINUTE (ref 1–3)
CFT P HPASE BLD TEG: 1.1 MINUTE (ref 1–3)
CFT P HPASE BLD TEG: 10.8 MINUTE (ref 1–3)
CHLORIDE BLD-SCNC: 110 MMOL/L (ref 94–109)
CHLORIDE BLD-SCNC: NORMAL MMOL/L
CI (COAGULATION INDEX)(Z) NON NATIVE: -12.1 (ref -3–3)
CI (COAGULATION INDEX)(Z) NON NATIVE: 0.6 (ref -3–3)
CI (COAGULATION INDEX)(Z) NON NATIVE: 2.7 (ref -3–3)
CI (COAGULATION INDEX)(Z): -10.3 (ref -3–3)
CI (COAGULATION INDEX)(Z): 3.2 (ref -3–3)
CI (COAGULATION INDEX): 3.5 (ref -3–3)
CI (COAGULATION INDEX): 3.7 (ref -3–3)
CLOT ANGLE BLD TEG: 32.9 DEGREES (ref 53–72)
CLOT ANGLE BLD TEG: 72.1 DEGREES (ref 53–72)
CLOT ANGLE BLD TEG: 73.6 DEGREES (ref 53–72)
CLOT ANGLE BLD TEG: 74 DEGREES (ref 59–74)
CLOT ANGLE BLD TEG: 76 DEGREES (ref 59–74)
CLOT ANGLE P HPASE BLD TEG: 36.2 DEGREES (ref 53–72)
CLOT ANGLE P HPASE BLD TEG: 74.9 DEGREES (ref 53–72)
CLOT INIT BLD TEG: 3.6 MINUTE (ref 5–10)
CLOT INIT BLD TEG: 4.3 MINUTE (ref 5–10)
CLOT INIT BLD TEG: 5.4 MINUTE (ref 4–9)
CLOT INIT BLD TEG: 5.7 MINUTE (ref 4–9)
CLOT INIT BLD TEG: 8.7 MINUTE (ref 5–10)
CLOT INIT P HPASE BLD TEG: 3.5 MINUTE (ref 5–10)
CLOT INIT P HPASE BLD TEG: 6.7 MINUTE (ref 5–10)
CLOT LYSIS 30M P MA LENFR BLD TEG: 0 % (ref 0–8)
CLOT LYSIS 30M P MA LENFR BLD TEG: ABNORMAL %
CLOT STRENGTH BLD TEG: 1.7 KD/SC (ref 4.5–11)
CLOT STRENGTH BLD TEG: 16.6 KD/SC (ref 5.3–13.2)
CLOT STRENGTH BLD TEG: 22.5 KD/SC (ref 5.3–13.2)
CLOT STRENGTH BLD TEG: 5.2 KD/SC (ref 4.5–11)
CLOT STRENGTH BLD TEG: 8.5 KD/SC (ref 4.5–11)
CLOT STRENGTH P HPASE BLD TEG: 1.8 KD/SC (ref 4.5–11)
CLOT STRENGTH P HPASE BLD TEG: 9.6 KD/SC (ref 4.5–11)
CMV IGG SERPL IA-ACNC: <0.2 U/ML
CMV IGG SERPL IA-ACNC: NORMAL
CMV IGM SERPL IA-ACNC: <8 AU/ML
CMV IGM SERPL IA-ACNC: NEGATIVE
CO2 SERPL-SCNC: 26 MMOL/L (ref 20–32)
CO2 SERPL-SCNC: NORMAL MMOL/L
CODING SYSTEM: NORMAL
CREAT SERPL-MCNC: 0.55 MG/DL (ref 0.52–1.04)
CREAT SERPL-MCNC: NORMAL MG/DL
CROSSMATCH: NORMAL
EBV VCA IGG SER IA-ACNC: >750 U/ML
EBV VCA IGG SER IA-ACNC: POSITIVE
EBV VCA IGM SER IA-ACNC: <10 U/ML
EBV VCA IGM SER IA-ACNC: NORMAL
EOSINOPHIL # BLD AUTO: 0 10E3/UL (ref 0–0.7)
EOSINOPHIL # BLD AUTO: 0 10E3/UL (ref 0–0.7)
EOSINOPHIL NFR BLD AUTO: 0 %
EOSINOPHIL NFR BLD AUTO: 0 %
ERYTHROCYTE [DISTWIDTH] IN BLOOD BY AUTOMATED COUNT: 14.4 % (ref 10–15)
ERYTHROCYTE [DISTWIDTH] IN BLOOD BY AUTOMATED COUNT: 14.6 % (ref 10–15)
FIBRINOGEN PPP-MCNC: 218 MG/DL (ref 170–490)
FIBRINOGEN PPP-MCNC: 239 MG/DL (ref 170–490)
FIBRINOGEN PPP-MCNC: 270 MG/DL (ref 170–490)
GFR SERPL CREATININE-BSD FRML MDRD: >90 ML/MIN/1.73M2
GFR SERPL CREATININE-BSD FRML MDRD: NORMAL ML/MIN/{1.73_M2}
GLUCOSE BLD-MCNC: 128 MG/DL (ref 70–99)
GLUCOSE BLD-MCNC: 130 MG/DL (ref 70–99)
GLUCOSE BLD-MCNC: 154 MG/DL (ref 70–99)
GLUCOSE BLD-MCNC: 164 MG/DL (ref 70–99)
GLUCOSE BLD-MCNC: 174 MG/DL (ref 70–99)
GLUCOSE BLD-MCNC: 192 MG/DL (ref 70–99)
GLUCOSE BLD-MCNC: 196 MG/DL (ref 70–99)
GLUCOSE BLD-MCNC: 205 MG/DL (ref 70–99)
GLUCOSE BLD-MCNC: 218 MG/DL (ref 70–99)
GLUCOSE BLD-MCNC: 262 MG/DL (ref 70–99)
GLUCOSE BLD-MCNC: 87 MG/DL (ref 70–99)
GLUCOSE BLD-MCNC: NORMAL MG/DL
GLUCOSE BLDC GLUCOMTR-MCNC: 108 MG/DL (ref 70–99)
GLUCOSE BLDC GLUCOMTR-MCNC: 117 MG/DL (ref 70–99)
GLUCOSE BLDC GLUCOMTR-MCNC: 137 MG/DL (ref 70–99)
GLUCOSE BLDC GLUCOMTR-MCNC: 143 MG/DL (ref 70–99)
GLUCOSE BLDC GLUCOMTR-MCNC: 149 MG/DL (ref 70–99)
GLUCOSE BLDC GLUCOMTR-MCNC: 157 MG/DL (ref 70–99)
GLUCOSE BLDC GLUCOMTR-MCNC: 178 MG/DL (ref 70–99)
GLUCOSE BLDC GLUCOMTR-MCNC: 206 MG/DL (ref 70–99)
GLUCOSE BLDC GLUCOMTR-MCNC: 220 MG/DL (ref 70–99)
GRAM STAIN RESULT: ABNORMAL
GRAM STAIN RESULT: ABNORMAL
GRAM STAIN RESULT: NORMAL
GRAM STAIN RESULT: NORMAL
HCO3 BLD-SCNC: 22 MMOL/L (ref 21–28)
HCO3 BLDA-SCNC: 16 MMOL/L (ref 21–28)
HCO3 BLDA-SCNC: 17 MMOL/L (ref 21–28)
HCO3 BLDA-SCNC: 18 MMOL/L (ref 21–28)
HCO3 BLDA-SCNC: 19 MMOL/L (ref 21–28)
HCO3 BLDA-SCNC: 20 MMOL/L (ref 21–28)
HCO3 BLDA-SCNC: 20 MMOL/L (ref 21–28)
HCO3 BLDA-SCNC: 22 MMOL/L (ref 21–28)
HCO3 BLDA-SCNC: 24 MMOL/L (ref 21–28)
HCT VFR BLD AUTO: 28.9 % (ref 35–47)
HCT VFR BLD AUTO: 31.4 % (ref 35–47)
HGB BLD-MCNC: 10.3 G/DL (ref 11.7–15.7)
HGB BLD-MCNC: 10.3 G/DL (ref 11.7–15.7)
HGB BLD-MCNC: 10.6 G/DL (ref 11.7–15.7)
HGB BLD-MCNC: 10.7 G/DL (ref 11.7–15.7)
HGB BLD-MCNC: 10.7 G/DL (ref 11.7–15.7)
HGB BLD-MCNC: 10.8 G/DL (ref 11.7–15.7)
HGB BLD-MCNC: 11.4 G/DL (ref 11.7–15.7)
HGB BLD-MCNC: 7.1 G/DL (ref 11.7–15.7)
HGB BLD-MCNC: 9 G/DL (ref 11.7–15.7)
HGB BLD-MCNC: 9.4 G/DL (ref 11.7–15.7)
HGB BLD-MCNC: 9.5 G/DL (ref 11.7–15.7)
HGB BLD-MCNC: 9.6 G/DL (ref 11.7–15.7)
HOLD SPECIMEN: NORMAL
IMM GRANULOCYTES # BLD: 0.1 10E3/UL
IMM GRANULOCYTES # BLD: 0.2 10E3/UL
IMM GRANULOCYTES NFR BLD: 1 %
IMM GRANULOCYTES NFR BLD: 1 %
INR PPP: 1.76 (ref 0.85–1.15)
INR PPP: 1.97 (ref 0.85–1.15)
INR PPP: 2 (ref 0.85–1.15)
ISSUE DATE AND TIME: NORMAL
LACTATE BLD-SCNC: 1.1 MMOL/L
LACTATE BLD-SCNC: 1.8 MMOL/L
LACTATE BLD-SCNC: 2 MMOL/L
LACTATE BLD-SCNC: 3.4 MMOL/L
LACTATE BLD-SCNC: 3.6 MMOL/L
LACTATE BLD-SCNC: 3.8 MMOL/L
LACTATE BLD-SCNC: 3.9 MMOL/L
LACTATE BLD-SCNC: 4.2 MMOL/L
LACTATE BLD-SCNC: 4.2 MMOL/L
LACTATE BLD-SCNC: 5.2 MMOL/L
LACTATE SERPL-SCNC: 1.5 MMOL/L (ref 0.7–2)
LACTATE SERPL-SCNC: 2.6 MMOL/L (ref 0.7–2)
LACTATE SERPL-SCNC: 2.7 MMOL/L (ref 0.7–2)
LIPASE SERPL-CCNC: 156 U/L (ref 73–393)
LYMPHOCYTES # BLD AUTO: 0.3 10E3/UL (ref 0.8–5.3)
LYMPHOCYTES # BLD AUTO: 0.4 10E3/UL (ref 0.8–5.3)
LYMPHOCYTES NFR BLD AUTO: 2 %
LYMPHOCYTES NFR BLD AUTO: 2 %
MAGNESIUM SERPL-MCNC: 1.7 MG/DL (ref 1.6–2.3)
MCF BLD TEG: 25.1 MM (ref 50–70)
MCF BLD TEG: 51.1 MM (ref 50–70)
MCF BLD TEG: 63 MM (ref 50–70)
MCF BLD TEG: 76.8 MM (ref 55–74)
MCF BLD TEG: 81.8 MM (ref 55–74)
MCF P HPASE BLD TEG: 26.2 MM (ref 50–70)
MCF P HPASE BLD TEG: 65.7 MM (ref 50–70)
MCH RBC QN AUTO: 30.3 PG (ref 26.5–33)
MCH RBC QN AUTO: 30.4 PG (ref 26.5–33)
MCHC RBC AUTO-ENTMCNC: 34.4 G/DL (ref 31.5–36.5)
MCHC RBC AUTO-ENTMCNC: 35.6 G/DL (ref 31.5–36.5)
MCV RBC AUTO: 85 FL (ref 78–100)
MCV RBC AUTO: 88 FL (ref 78–100)
MONOCYTES # BLD AUTO: 0.4 10E3/UL (ref 0–1.3)
MONOCYTES # BLD AUTO: 0.6 10E3/UL (ref 0–1.3)
MONOCYTES NFR BLD AUTO: 2 %
MONOCYTES NFR BLD AUTO: 6 %
MRSA DNA SPEC QL NAA+PROBE: NEGATIVE
NEUTROPHILS # BLD AUTO: 19.7 10E3/UL (ref 1.6–8.3)
NEUTROPHILS # BLD AUTO: 9.3 10E3/UL (ref 1.6–8.3)
NEUTROPHILS NFR BLD AUTO: 91 %
NEUTROPHILS NFR BLD AUTO: 95 %
NRBC # BLD AUTO: 0 10E3/UL
NRBC # BLD AUTO: 0 10E3/UL
NRBC BLD AUTO-RTO: 0 /100
NRBC BLD AUTO-RTO: 0 /100
O2/TOTAL GAS SETTING VFR VENT: 100 %
OXYHGB MFR BLD: 99 % (ref 92–100)
OXYHGB MFR BLDA: 97 % (ref 92–100)
OXYHGB MFR BLDA: 98 % (ref 92–100)
OXYHGB MFR BLDV: 78 % (ref 70–75)
PCO2 BLD: 30 MM HG (ref 35–45)
PCO2 BLDA: 32 MM HG (ref 35–45)
PCO2 BLDA: 34 MM HG (ref 35–45)
PCO2 BLDA: 35 MM HG (ref 35–45)
PCO2 BLDA: 35 MM HG (ref 35–45)
PCO2 BLDA: 36 MM HG (ref 35–45)
PCO2 BLDA: 37 MM HG (ref 35–45)
PCO2 BLDA: 39 MM HG (ref 35–45)
PCO2 BLDA: 39 MM HG (ref 35–45)
PCO2 BLDA: 40 MM HG (ref 35–45)
PCO2 BLDA: 41 MM HG (ref 35–45)
PH BLD: 7.49 [PH] (ref 7.35–7.45)
PH BLDA: 7.24 [PH] (ref 7.35–7.45)
PH BLDA: 7.3 [PH] (ref 7.35–7.45)
PH BLDA: 7.31 [PH] (ref 7.35–7.45)
PH BLDA: 7.31 [PH] (ref 7.35–7.45)
PH BLDA: 7.32 [PH] (ref 7.35–7.45)
PH BLDA: 7.33 [PH] (ref 7.35–7.45)
PH BLDA: 7.33 [PH] (ref 7.35–7.45)
PH BLDA: 7.34 [PH] (ref 7.35–7.45)
PH BLDA: 7.34 [PH] (ref 7.35–7.45)
PH BLDA: 7.42 [PH] (ref 7.35–7.45)
PHOSPHATE SERPL-MCNC: 2.7 MG/DL (ref 2.5–4.5)
PLATELET # BLD AUTO: 130 10E3/UL (ref 150–450)
PLATELET # BLD AUTO: 137 10E3/UL (ref 150–450)
PLATELET # BLD AUTO: 95 10E3/UL (ref 150–450)
PO2 BLD: 405 MM HG (ref 80–105)
PO2 BLDA: 118 MM HG (ref 80–105)
PO2 BLDA: 130 MM HG (ref 80–105)
PO2 BLDA: 130 MM HG (ref 80–105)
PO2 BLDA: 135 MM HG (ref 80–105)
PO2 BLDA: 138 MM HG (ref 80–105)
PO2 BLDA: 147 MM HG (ref 80–105)
PO2 BLDA: 156 MM HG (ref 80–105)
PO2 BLDA: 159 MM HG (ref 80–105)
PO2 BLDA: 164 MM HG (ref 80–105)
PO2 BLDA: >488 MM HG (ref 80–105)
POTASSIUM BLD-SCNC: 3.2 MMOL/L (ref 3.4–5.3)
POTASSIUM BLD-SCNC: 3.3 MMOL/L (ref 3.5–5)
POTASSIUM BLD-SCNC: 3.6 MMOL/L (ref 3.5–5)
POTASSIUM BLD-SCNC: 3.7 MMOL/L (ref 3.5–5)
POTASSIUM BLD-SCNC: 3.9 MMOL/L (ref 3.5–5)
POTASSIUM BLD-SCNC: 4.2 MMOL/L (ref 3.5–5)
POTASSIUM BLD-SCNC: 4.3 MMOL/L (ref 3.5–5)
POTASSIUM BLD-SCNC: 4.3 MMOL/L (ref 3.5–5)
POTASSIUM BLD-SCNC: 4.5 MMOL/L (ref 3.5–5)
POTASSIUM BLD-SCNC: 4.7 MMOL/L (ref 3.5–5)
POTASSIUM BLD-SCNC: 4.8 MMOL/L (ref 3.5–5)
POTASSIUM BLD-SCNC: NORMAL MMOL/L
PROT SERPL-MCNC: 3.5 G/DL (ref 6.8–8.8)
PROT SERPL-MCNC: 3.5 G/DL (ref 6.8–8.8)
RBC # BLD AUTO: 3.39 10E6/UL (ref 3.8–5.2)
RBC # BLD AUTO: 3.57 10E6/UL (ref 3.8–5.2)
SA TARGET DNA: NEGATIVE
SODIUM BLD-SCNC: 127 MMOL/L (ref 133–144)
SODIUM BLD-SCNC: 128 MMOL/L (ref 133–144)
SODIUM BLD-SCNC: 130 MMOL/L (ref 133–144)
SODIUM BLD-SCNC: 132 MMOL/L (ref 133–144)
SODIUM BLD-SCNC: 132 MMOL/L (ref 133–144)
SODIUM BLD-SCNC: 133 MMOL/L (ref 133–144)
SODIUM BLD-SCNC: 136 MMOL/L (ref 133–144)
SODIUM BLD-SCNC: 137 MMOL/L (ref 133–144)
SODIUM BLD-SCNC: 138 MMOL/L (ref 133–144)
SODIUM BLD-SCNC: 138 MMOL/L (ref 133–144)
SODIUM SERPL-SCNC: 143 MMOL/L (ref 133–144)
SODIUM SERPL-SCNC: NORMAL MMOL/L
UNIT ABO/RH: NORMAL
UNIT NUMBER: NORMAL
UNIT STATUS: NORMAL
UNIT TYPE ISBT: 2800
UNIT TYPE ISBT: 6200
UNIT TYPE ISBT: 8400
WBC # BLD AUTO: 10.3 10E3/UL (ref 4–11)
WBC # BLD AUTO: 20.7 10E3/UL (ref 4–11)

## 2021-11-18 PROCEDURE — 258N000001 HC RX 258: Performed by: NURSE ANESTHETIST, CERTIFIED REGISTERED

## 2021-11-18 PROCEDURE — 85396 CLOTTING ASSAY WHOLE BLOOD: CPT | Performed by: UROLOGY

## 2021-11-18 PROCEDURE — 812N000006 HC ACQUISITION LIVER CADAVER DONOR

## 2021-11-18 PROCEDURE — 250N000009 HC RX 250: Performed by: TRANSPLANT SURGERY

## 2021-11-18 PROCEDURE — 93975 VASCULAR STUDY: CPT

## 2021-11-18 PROCEDURE — P9041 ALBUMIN (HUMAN),5%, 50ML: HCPCS | Performed by: NURSE ANESTHETIST, CERTIFIED REGISTERED

## 2021-11-18 PROCEDURE — 83605 ASSAY OF LACTIC ACID: CPT

## 2021-11-18 PROCEDURE — 999N000045 HC STATISTIC DAILY SWAN MONITORING

## 2021-11-18 PROCEDURE — 85396 CLOTTING ASSAY WHOLE BLOOD: CPT | Performed by: ANESTHESIOLOGY

## 2021-11-18 PROCEDURE — 36415 COLL VENOUS BLD VENIPUNCTURE: CPT | Performed by: ANESTHESIOLOGY

## 2021-11-18 PROCEDURE — 82810 BLOOD GASES O2 SAT ONLY: CPT | Performed by: TRANSPLANT SURGERY

## 2021-11-18 PROCEDURE — 85018 HEMOGLOBIN: CPT | Performed by: SURGERY

## 2021-11-18 PROCEDURE — 36415 COLL VENOUS BLD VENIPUNCTURE: CPT | Performed by: UROLOGY

## 2021-11-18 PROCEDURE — 83690 ASSAY OF LIPASE: CPT

## 2021-11-18 PROCEDURE — 999N000157 HC STATISTIC RCP TIME EA 10 MIN

## 2021-11-18 PROCEDURE — 85610 PROTHROMBIN TIME: CPT | Performed by: SURGERY

## 2021-11-18 PROCEDURE — 258N000003 HC RX IP 258 OP 636: Performed by: TRANSPLANT SURGERY

## 2021-11-18 PROCEDURE — 87205 SMEAR GRAM STAIN: CPT | Performed by: TRANSPLANT SURGERY

## 2021-11-18 PROCEDURE — 84132 ASSAY OF SERUM POTASSIUM: CPT | Performed by: TRANSPLANT SURGERY

## 2021-11-18 PROCEDURE — 99232 SBSQ HOSP IP/OBS MODERATE 35: CPT | Performed by: TRANSPLANT SURGERY

## 2021-11-18 PROCEDURE — 85730 THROMBOPLASTIN TIME PARTIAL: CPT | Performed by: UROLOGY

## 2021-11-18 PROCEDURE — 250N000013 HC RX MED GY IP 250 OP 250 PS 637: Performed by: PSYCHIATRY & NEUROLOGY

## 2021-11-18 PROCEDURE — 999N000065 XR CHEST PORT 1 VIEW

## 2021-11-18 PROCEDURE — 93503 INSERT/PLACE HEART CATHETER: CPT

## 2021-11-18 PROCEDURE — 999N000065 XR ABDOMEN PORT 1 VIEWS

## 2021-11-18 PROCEDURE — P9059 PLASMA, FRZ BETWEEN 8-24HOUR: HCPCS | Performed by: TRANSPLANT SURGERY

## 2021-11-18 PROCEDURE — 83605 ASSAY OF LACTIC ACID: CPT | Performed by: SURGERY

## 2021-11-18 PROCEDURE — 82140 ASSAY OF AMMONIA: CPT

## 2021-11-18 PROCEDURE — 82810 BLOOD GASES O2 SAT ONLY: CPT

## 2021-11-18 PROCEDURE — 71045 X-RAY EXAM CHEST 1 VIEW: CPT | Mod: 26 | Performed by: RADIOLOGY

## 2021-11-18 PROCEDURE — 250N000013 HC RX MED GY IP 250 OP 250 PS 637: Performed by: SURGERY

## 2021-11-18 PROCEDURE — 85384 FIBRINOGEN ACTIVITY: CPT | Performed by: UROLOGY

## 2021-11-18 PROCEDURE — 250N000011 HC RX IP 250 OP 636: Performed by: TRANSPLANT SURGERY

## 2021-11-18 PROCEDURE — 82330 ASSAY OF CALCIUM: CPT

## 2021-11-18 PROCEDURE — 99291 CRITICAL CARE FIRST HOUR: CPT | Mod: GC | Performed by: SURGERY

## 2021-11-18 PROCEDURE — 250N000009 HC RX 250: Performed by: NURSE ANESTHETIST, CERTIFIED REGISTERED

## 2021-11-18 PROCEDURE — 250N000025 HC SEVOFLURANE, PER MIN: Performed by: TRANSPLANT SURGERY

## 2021-11-18 PROCEDURE — 83605 ASSAY OF LACTIC ACID: CPT | Performed by: STUDENT IN AN ORGANIZED HEALTH CARE EDUCATION/TRAINING PROGRAM

## 2021-11-18 PROCEDURE — 250N000013 HC RX MED GY IP 250 OP 250 PS 637: Performed by: TRANSPLANT SURGERY

## 2021-11-18 PROCEDURE — 83735 ASSAY OF MAGNESIUM: CPT | Performed by: TRANSPLANT SURGERY

## 2021-11-18 PROCEDURE — 82803 BLOOD GASES ANY COMBINATION: CPT

## 2021-11-18 PROCEDURE — 258N000003 HC RX IP 258 OP 636: Performed by: NURSE PRACTITIONER

## 2021-11-18 PROCEDURE — 250N000011 HC RX IP 250 OP 636: Performed by: SURGERY

## 2021-11-18 PROCEDURE — 258N000002 HC RX IP 258 OP 250: Performed by: SURGERY

## 2021-11-18 PROCEDURE — 999N000015 HC STATISTIC ARTERIAL MONITORING DAILY

## 2021-11-18 PROCEDURE — 82805 BLOOD GASES W/O2 SATURATION: CPT

## 2021-11-18 PROCEDURE — 200N000002 HC R&B ICU UMMC

## 2021-11-18 PROCEDURE — 250N000009 HC RX 250: Performed by: NURSE PRACTITIONER

## 2021-11-18 PROCEDURE — 84450 TRANSFERASE (AST) (SGOT): CPT | Performed by: SURGERY

## 2021-11-18 PROCEDURE — 370N000017 HC ANESTHESIA TECHNICAL FEE, PER MIN: Performed by: TRANSPLANT SURGERY

## 2021-11-18 PROCEDURE — 85730 THROMBOPLASTIN TIME PARTIAL: CPT | Performed by: SURGERY

## 2021-11-18 PROCEDURE — 87641 MR-STAPH DNA AMP PROBE: CPT | Performed by: SURGERY

## 2021-11-18 PROCEDURE — 84295 ASSAY OF SERUM SODIUM: CPT

## 2021-11-18 PROCEDURE — 999N000155 HC STATISTIC RAPCV CVP MONITORING

## 2021-11-18 PROCEDURE — 250N000011 HC RX IP 250 OP 636: Performed by: STUDENT IN AN ORGANIZED HEALTH CARE EDUCATION/TRAINING PROGRAM

## 2021-11-18 PROCEDURE — 250N000012 HC RX MED GY IP 250 OP 636 PS 637: Performed by: SURGERY

## 2021-11-18 PROCEDURE — 88309 TISSUE EXAM BY PATHOLOGIST: CPT | Mod: TC | Performed by: TRANSPLANT SURGERY

## 2021-11-18 PROCEDURE — 36415 COLL VENOUS BLD VENIPUNCTURE: CPT | Performed by: STUDENT IN AN ORGANIZED HEALTH CARE EDUCATION/TRAINING PROGRAM

## 2021-11-18 PROCEDURE — 93975 VASCULAR STUDY: CPT | Mod: 26 | Performed by: RADIOLOGY

## 2021-11-18 PROCEDURE — 272N000001 HC OR GENERAL SUPPLY STERILE: Performed by: TRANSPLANT SURGERY

## 2021-11-18 PROCEDURE — 87186 SC STD MICRODIL/AGAR DIL: CPT | Performed by: TRANSPLANT SURGERY

## 2021-11-18 PROCEDURE — 258N000003 HC RX IP 258 OP 636

## 2021-11-18 PROCEDURE — 0FN00ZZ RELEASE LIVER, OPEN APPROACH: ICD-10-PCS | Performed by: TRANSPLANT SURGERY

## 2021-11-18 PROCEDURE — 250N000013 HC RX MED GY IP 250 OP 250 PS 637

## 2021-11-18 PROCEDURE — P9012 CRYOPRECIPITATE EACH UNIT: HCPCS | Performed by: TRANSPLANT SURGERY

## 2021-11-18 PROCEDURE — 85049 AUTOMATED PLATELET COUNT: CPT | Performed by: UROLOGY

## 2021-11-18 PROCEDURE — 410N000004: Performed by: TRANSPLANT SURGERY

## 2021-11-18 PROCEDURE — 87102 FUNGUS ISOLATION CULTURE: CPT | Performed by: TRANSPLANT SURGERY

## 2021-11-18 PROCEDURE — P9016 RBC LEUKOCYTES REDUCED: HCPCS | Performed by: TRANSPLANT SURGERY

## 2021-11-18 PROCEDURE — 36415 COLL VENOUS BLD VENIPUNCTURE: CPT

## 2021-11-18 PROCEDURE — 84155 ASSAY OF PROTEIN SERUM: CPT | Performed by: SURGERY

## 2021-11-18 PROCEDURE — P9041 ALBUMIN (HUMAN),5%, 50ML: HCPCS | Performed by: TRANSPLANT SURGERY

## 2021-11-18 PROCEDURE — 94002 VENT MGMT INPAT INIT DAY: CPT

## 2021-11-18 PROCEDURE — 74018 RADEX ABDOMEN 1 VIEW: CPT | Mod: 26 | Performed by: RADIOLOGY

## 2021-11-18 PROCEDURE — 85049 AUTOMATED PLATELET COUNT: CPT | Performed by: SURGERY

## 2021-11-18 PROCEDURE — P9041 ALBUMIN (HUMAN),5%, 50ML: HCPCS | Performed by: STUDENT IN AN ORGANIZED HEALTH CARE EDUCATION/TRAINING PROGRAM

## 2021-11-18 PROCEDURE — 87070 CULTURE OTHR SPECIMN AEROBIC: CPT | Performed by: TRANSPLANT SURGERY

## 2021-11-18 PROCEDURE — 84295 ASSAY OF SERUM SODIUM: CPT | Performed by: SURGERY

## 2021-11-18 PROCEDURE — 88304 TISSUE EXAM BY PATHOLOGIST: CPT | Mod: 26 | Performed by: PATHOLOGY

## 2021-11-18 PROCEDURE — 86923 COMPATIBILITY TEST ELECTRIC: CPT | Performed by: TRANSPLANT SURGERY

## 2021-11-18 PROCEDURE — 85610 PROTHROMBIN TIME: CPT | Performed by: UROLOGY

## 2021-11-18 PROCEDURE — P9037 PLATE PHERES LEUKOREDU IRRAD: HCPCS | Performed by: TRANSPLANT SURGERY

## 2021-11-18 PROCEDURE — 88309 TISSUE EXAM BY PATHOLOGIST: CPT | Mod: 26 | Performed by: PATHOLOGY

## 2021-11-18 PROCEDURE — 87075 CULTR BACTERIA EXCEPT BLOOD: CPT | Performed by: TRANSPLANT SURGERY

## 2021-11-18 PROCEDURE — 360N000078 HC SURGERY LEVEL 5, PER MIN: Performed by: TRANSPLANT SURGERY

## 2021-11-18 PROCEDURE — 250N000011 HC RX IP 250 OP 636: Performed by: NURSE PRACTITIONER

## 2021-11-18 PROCEDURE — 250N000011 HC RX IP 250 OP 636

## 2021-11-18 PROCEDURE — 83735 ASSAY OF MAGNESIUM: CPT

## 2021-11-18 PROCEDURE — 84100 ASSAY OF PHOSPHORUS: CPT

## 2021-11-18 PROCEDURE — 88313 SPECIAL STAINS GROUP 2: CPT | Mod: 26 | Performed by: PATHOLOGY

## 2021-11-18 PROCEDURE — 0FY00Z0 TRANSPLANTATION OF LIVER, ALLOGENEIC, OPEN APPROACH: ICD-10-PCS | Performed by: TRANSPLANT SURGERY

## 2021-11-18 PROCEDURE — 85384 FIBRINOGEN ACTIVITY: CPT | Performed by: SURGERY

## 2021-11-18 PROCEDURE — 272N000086 HC PACK RI-RAPID INFUSION: Performed by: TRANSPLANT SURGERY

## 2021-11-18 PROCEDURE — 258N000003 HC RX IP 258 OP 636: Performed by: NURSE ANESTHETIST, CERTIFIED REGISTERED

## 2021-11-18 PROCEDURE — 84100 ASSAY OF PHOSPHORUS: CPT | Performed by: TRANSPLANT SURGERY

## 2021-11-18 PROCEDURE — 250N000009 HC RX 250

## 2021-11-18 PROCEDURE — 82150 ASSAY OF AMYLASE: CPT

## 2021-11-18 PROCEDURE — 250N000011 HC RX IP 250 OP 636: Performed by: NURSE ANESTHETIST, CERTIFIED REGISTERED

## 2021-11-18 PROCEDURE — 258N000001 HC RX 258: Performed by: SURGERY

## 2021-11-18 PROCEDURE — 272N000085 HC PACK CELL SAVER CSP: Performed by: TRANSPLANT SURGERY

## 2021-11-18 RX ORDER — NICOTINE POLACRILEX 4 MG
15-30 LOZENGE BUCCAL
Status: DISCONTINUED | OUTPATIENT
Start: 2021-11-18 | End: 2021-11-23

## 2021-11-18 RX ORDER — DEXTROSE MONOHYDRATE 25 G/50ML
INJECTION, SOLUTION INTRAVENOUS PRN
Status: DISCONTINUED | OUTPATIENT
Start: 2021-11-18 | End: 2021-11-18

## 2021-11-18 RX ORDER — CALCIUM CHLORIDE 100 MG/ML
INJECTION INTRAVENOUS; INTRAVENTRICULAR PRN
Status: DISCONTINUED | OUTPATIENT
Start: 2021-11-18 | End: 2021-11-18

## 2021-11-18 RX ORDER — SODIUM CHLORIDE, SODIUM GLUCONATE, SODIUM ACETATE, POTASSIUM CHLORIDE AND MAGNESIUM CHLORIDE 526; 502; 368; 37; 30 MG/100ML; MG/100ML; MG/100ML; MG/100ML; MG/100ML
INJECTION, SOLUTION INTRAVENOUS CONTINUOUS PRN
Status: DISCONTINUED | OUTPATIENT
Start: 2021-11-18 | End: 2021-11-18

## 2021-11-18 RX ORDER — ALBUMIN, HUMAN INJ 5% 5 %
SOLUTION INTRAVENOUS PRN
Status: DISCONTINUED | OUTPATIENT
Start: 2021-11-18 | End: 2021-11-18

## 2021-11-18 RX ORDER — FLUCONAZOLE 2 MG/ML
400 INJECTION, SOLUTION INTRAVENOUS EVERY 24 HOURS
Status: DISCONTINUED | OUTPATIENT
Start: 2021-11-19 | End: 2021-11-20

## 2021-11-18 RX ORDER — NALOXONE HYDROCHLORIDE 0.4 MG/ML
0.4 INJECTION, SOLUTION INTRAMUSCULAR; INTRAVENOUS; SUBCUTANEOUS
Status: DISCONTINUED | OUTPATIENT
Start: 2021-11-18 | End: 2021-11-27 | Stop reason: HOSPADM

## 2021-11-18 RX ORDER — PROPOFOL 10 MG/ML
10-20 INJECTION, EMULSION INTRAVENOUS EVERY 30 MIN PRN
Status: DISCONTINUED | OUTPATIENT
Start: 2021-11-18 | End: 2021-11-20

## 2021-11-18 RX ORDER — NOREPINEPHRINE BITARTRATE 0.06 MG/ML
.01-.6 INJECTION, SOLUTION INTRAVENOUS CONTINUOUS
Status: DISCONTINUED | OUTPATIENT
Start: 2021-11-18 | End: 2021-11-20

## 2021-11-18 RX ORDER — AMOXICILLIN 250 MG
1 CAPSULE ORAL 2 TIMES DAILY
Status: DISCONTINUED | OUTPATIENT
Start: 2021-11-18 | End: 2021-11-27 | Stop reason: HOSPADM

## 2021-11-18 RX ORDER — TACROLIMUS 1 MG/1
2 CAPSULE ORAL
Status: DISCONTINUED | OUTPATIENT
Start: 2021-11-18 | End: 2021-11-20

## 2021-11-18 RX ORDER — FENTANYL CITRATE 50 UG/ML
INJECTION, SOLUTION INTRAMUSCULAR; INTRAVENOUS PRN
Status: DISCONTINUED | OUTPATIENT
Start: 2021-11-18 | End: 2021-11-18

## 2021-11-18 RX ORDER — VALGANCICLOVIR 450 MG/1
450 TABLET, FILM COATED ORAL DAILY
Status: DISCONTINUED | OUTPATIENT
Start: 2021-11-18 | End: 2021-11-20

## 2021-11-18 RX ORDER — METHYLPREDNISOLONE SODIUM SUCCINATE 125 MG/2ML
50 INJECTION, POWDER, LYOPHILIZED, FOR SOLUTION INTRAMUSCULAR; INTRAVENOUS ONCE
Status: COMPLETED | OUTPATIENT
Start: 2021-11-21 | End: 2021-11-21

## 2021-11-18 RX ORDER — METHYLPREDNISOLONE SODIUM SUCCINATE 125 MG/2ML
100 INJECTION, POWDER, LYOPHILIZED, FOR SOLUTION INTRAMUSCULAR; INTRAVENOUS ONCE
Status: COMPLETED | OUTPATIENT
Start: 2021-11-20 | End: 2021-11-20

## 2021-11-18 RX ORDER — POLYETHYLENE GLYCOL 3350 17 G/17G
17 POWDER, FOR SOLUTION ORAL DAILY
Status: DISCONTINUED | OUTPATIENT
Start: 2021-11-18 | End: 2021-11-27 | Stop reason: HOSPADM

## 2021-11-18 RX ORDER — SULFAMETHOXAZOLE AND TRIMETHOPRIM 400; 80 MG/1; MG/1
1 TABLET ORAL DAILY
Status: DISCONTINUED | OUTPATIENT
Start: 2021-11-18 | End: 2021-11-27 | Stop reason: HOSPADM

## 2021-11-18 RX ORDER — PROPOFOL 10 MG/ML
INJECTION, EMULSION INTRAVENOUS PRN
Status: DISCONTINUED | OUTPATIENT
Start: 2021-11-18 | End: 2021-11-18

## 2021-11-18 RX ORDER — MAGNESIUM OXIDE 400 MG/1
400 TABLET ORAL 2 TIMES DAILY
Status: DISCONTINUED | OUTPATIENT
Start: 2021-11-18 | End: 2021-11-19

## 2021-11-18 RX ORDER — PREDNISONE 10 MG/1
10 TABLET ORAL ONCE
Status: COMPLETED | OUTPATIENT
Start: 2021-11-23 | End: 2021-11-23

## 2021-11-18 RX ORDER — METHYLPREDNISOLONE SODIUM SUCCINATE 1 G/16ML
INJECTION, POWDER, LYOPHILIZED, FOR SOLUTION INTRAMUSCULAR; INTRAVENOUS PRN
Status: DISCONTINUED | OUTPATIENT
Start: 2021-11-18 | End: 2021-11-18

## 2021-11-18 RX ORDER — VANCOMYCIN HYDROCHLORIDE 1 G/20ML
INJECTION, POWDER, LYOPHILIZED, FOR SOLUTION INTRAVENOUS PRN
Status: DISCONTINUED | OUTPATIENT
Start: 2021-11-18 | End: 2021-11-18

## 2021-11-18 RX ORDER — ALBUMIN, HUMAN INJ 5% 5 %
25 SOLUTION INTRAVENOUS ONCE
Status: COMPLETED | OUTPATIENT
Start: 2021-11-18 | End: 2021-11-18

## 2021-11-18 RX ORDER — VALGANCICLOVIR HYDROCHLORIDE 50 MG/ML
450 POWDER, FOR SOLUTION ORAL DAILY
Status: DISCONTINUED | OUTPATIENT
Start: 2021-11-18 | End: 2021-11-20

## 2021-11-18 RX ORDER — DEXTROSE MONOHYDRATE 25 G/50ML
25-50 INJECTION, SOLUTION INTRAVENOUS
Status: DISCONTINUED | OUTPATIENT
Start: 2021-11-18 | End: 2021-11-23

## 2021-11-18 RX ORDER — NALOXONE HYDROCHLORIDE 0.4 MG/ML
0.2 INJECTION, SOLUTION INTRAMUSCULAR; INTRAVENOUS; SUBCUTANEOUS
Status: DISCONTINUED | OUTPATIENT
Start: 2021-11-18 | End: 2021-11-27 | Stop reason: HOSPADM

## 2021-11-18 RX ORDER — POTASSIUM CHLORIDE 1.5 G/1.58G
20 POWDER, FOR SOLUTION ORAL ONCE
Status: COMPLETED | OUTPATIENT
Start: 2021-11-18 | End: 2021-11-18

## 2021-11-18 RX ORDER — AMOXICILLIN 250 MG
2 CAPSULE ORAL 2 TIMES DAILY
Status: DISCONTINUED | OUTPATIENT
Start: 2021-11-18 | End: 2021-11-27 | Stop reason: HOSPADM

## 2021-11-18 RX ORDER — PROPOFOL 10 MG/ML
INJECTION, EMULSION INTRAVENOUS CONTINUOUS PRN
Status: DISCONTINUED | OUTPATIENT
Start: 2021-11-18 | End: 2021-11-18

## 2021-11-18 RX ORDER — VANCOMYCIN HYDROCHLORIDE 1 G/200ML
1000 INJECTION, SOLUTION INTRAVENOUS
Status: DISCONTINUED | OUTPATIENT
Start: 2021-11-18 | End: 2021-11-18

## 2021-11-18 RX ORDER — PIPERACILLIN SODIUM, TAZOBACTAM SODIUM 3; .375 G/15ML; G/15ML
3.38 INJECTION, POWDER, LYOPHILIZED, FOR SOLUTION INTRAVENOUS EVERY 6 HOURS
Status: DISCONTINUED | OUTPATIENT
Start: 2021-11-18 | End: 2021-11-20

## 2021-11-18 RX ORDER — MYCOPHENOLATE MOFETIL 200 MG/ML
750 POWDER, FOR SUSPENSION ORAL
Status: DISCONTINUED | OUTPATIENT
Start: 2021-11-18 | End: 2021-11-23

## 2021-11-18 RX ORDER — ALBUMIN HUMAN 5 %
INTRAVENOUS SOLUTION INTRAVENOUS PRN
Status: DISCONTINUED | OUTPATIENT
Start: 2021-11-18 | End: 2021-11-18

## 2021-11-18 RX ORDER — MYCOPHENOLATE MOFETIL 250 MG/1
750 CAPSULE ORAL
Status: DISCONTINUED | OUTPATIENT
Start: 2021-11-18 | End: 2021-11-27 | Stop reason: HOSPADM

## 2021-11-18 RX ORDER — VERAPAMIL HYDROCHLORIDE 2.5 MG/ML
INJECTION, SOLUTION INTRAVENOUS PRN
Status: DISCONTINUED | OUTPATIENT
Start: 2021-11-18 | End: 2021-11-18

## 2021-11-18 RX ORDER — PROPOFOL 10 MG/ML
5-75 INJECTION, EMULSION INTRAVENOUS CONTINUOUS
Status: DISCONTINUED | OUTPATIENT
Start: 2021-11-18 | End: 2021-11-20

## 2021-11-18 RX ADMIN — PIPERACILLIN AND TAZOBACTAM 2.25 G: 3; .375 INJECTION, POWDER, FOR SOLUTION INTRAVENOUS at 14:09

## 2021-11-18 RX ADMIN — SODIUM BICARBONATE 50 MEQ: 84 INJECTION, SOLUTION INTRAVENOUS at 09:45

## 2021-11-18 RX ADMIN — FENTANYL CITRATE 50 MCG: 50 INJECTION, SOLUTION INTRAMUSCULAR; INTRAVENOUS at 14:11

## 2021-11-18 RX ADMIN — NOREPINEPHRINE BITARTRATE 12.8 MCG: 1 INJECTION, SOLUTION, CONCENTRATE INTRAVENOUS at 08:54

## 2021-11-18 RX ADMIN — VANCOMYCIN HYDROCHLORIDE 1000 MG: 1 INJECTION, POWDER, LYOPHILIZED, FOR SOLUTION INTRAVENOUS at 08:35

## 2021-11-18 RX ADMIN — NOREPINEPHRINE BITARTRATE 12.8 MCG: 1 INJECTION, SOLUTION, CONCENTRATE INTRAVENOUS at 12:22

## 2021-11-18 RX ADMIN — PROPOFOL 30 MCG/KG/MIN: 10 INJECTION, EMULSION INTRAVENOUS at 13:45

## 2021-11-18 RX ADMIN — ALBUMIN (HUMAN) 250 ML: 12.5 SOLUTION INTRAVENOUS at 10:52

## 2021-11-18 RX ADMIN — SODIUM CHLORIDE, SODIUM GLUCONATE, SODIUM ACETATE, POTASSIUM CHLORIDE AND MAGNESIUM CHLORIDE: 526; 502; 368; 37; 30 INJECTION, SOLUTION INTRAVENOUS at 06:28

## 2021-11-18 RX ADMIN — CALCIUM CHLORIDE 1 G: 100 INJECTION, SOLUTION INTRAVENOUS at 12:00

## 2021-11-18 RX ADMIN — PIPERACILLIN AND TAZOBACTAM 3.38 G: 3; .375 INJECTION, POWDER, FOR SOLUTION INTRAVENOUS at 08:19

## 2021-11-18 RX ADMIN — NOREPINEPHRINE BITARTRATE 12.8 MCG: 1 INJECTION, SOLUTION, CONCENTRATE INTRAVENOUS at 11:58

## 2021-11-18 RX ADMIN — PROPOFOL 50 MCG/KG/MIN: 10 INJECTION, EMULSION INTRAVENOUS at 18:11

## 2021-11-18 RX ADMIN — CALCIUM CHLORIDE 1 G: 100 INJECTION INTRAVENOUS; INTRAVENTRICULAR at 09:48

## 2021-11-18 RX ADMIN — NOREPINEPHRINE BITARTRATE 6.4 MCG: 1 INJECTION, SOLUTION, CONCENTRATE INTRAVENOUS at 10:34

## 2021-11-18 RX ADMIN — NOREPINEPHRINE BITARTRATE 6.4 MCG: 1 INJECTION, SOLUTION, CONCENTRATE INTRAVENOUS at 10:23

## 2021-11-18 RX ADMIN — VASOPRESSIN 2.4 UNITS/HR: 20 INJECTION INTRAVENOUS at 15:33

## 2021-11-18 RX ADMIN — NOREPINEPHRINE BITARTRATE 12.8 MCG: 1 INJECTION, SOLUTION, CONCENTRATE INTRAVENOUS at 12:29

## 2021-11-18 RX ADMIN — Medication 400 MG: at 20:23

## 2021-11-18 RX ADMIN — PIPERACILLIN AND TAZOBACTAM 3.38 G: 3; .375 INJECTION, POWDER, LYOPHILIZED, FOR SOLUTION INTRAVENOUS at 20:22

## 2021-11-18 RX ADMIN — TACROLIMUS 2 MG: 5 CAPSULE ORAL at 17:03

## 2021-11-18 RX ADMIN — PIPERACILLIN AND TAZOBACTAM 2.25 G: 3; .375 INJECTION, POWDER, FOR SOLUTION INTRAVENOUS at 10:19

## 2021-11-18 RX ADMIN — ROCURONIUM BROMIDE 30 MG: 50 INJECTION, SOLUTION INTRAVENOUS at 09:52

## 2021-11-18 RX ADMIN — POTASSIUM & SODIUM PHOSPHATES POWDER PACK 280-160-250 MG 1 PACKET: 280-160-250 PACK at 20:22

## 2021-11-18 RX ADMIN — HUMAN INSULIN 2 UNITS/HR: 100 INJECTION, SOLUTION SUBCUTANEOUS at 10:25

## 2021-11-18 RX ADMIN — SODIUM BICARBONATE 50 MEQ: 84 INJECTION, SOLUTION INTRAVENOUS at 09:15

## 2021-11-18 RX ADMIN — HUMAN INSULIN 4 UNITS/HR: 100 INJECTION, SOLUTION SUBCUTANEOUS at 15:35

## 2021-11-18 RX ADMIN — ALBUMIN (HUMAN) 250 ML: 12.5 SOLUTION INTRAVENOUS at 11:04

## 2021-11-18 RX ADMIN — NOREPINEPHRINE BITARTRATE 6.4 MCG: 1 INJECTION, SOLUTION, CONCENTRATE INTRAVENOUS at 08:42

## 2021-11-18 RX ADMIN — CALCIUM CHLORIDE 1 G: 100 INJECTION INTRAVENOUS; INTRAVENTRICULAR at 09:03

## 2021-11-18 RX ADMIN — SODIUM CHLORIDE: 4.5 INJECTION, SOLUTION INTRAVENOUS at 20:00

## 2021-11-18 RX ADMIN — NOREPINEPHRINE BITARTRATE 12.8 MCG: 1 INJECTION, SOLUTION, CONCENTRATE INTRAVENOUS at 10:59

## 2021-11-18 RX ADMIN — ROCURONIUM BROMIDE 70 MG: 50 INJECTION, SOLUTION INTRAVENOUS at 06:54

## 2021-11-18 RX ADMIN — ROCURONIUM BROMIDE 30 MG: 50 INJECTION, SOLUTION INTRAVENOUS at 08:28

## 2021-11-18 RX ADMIN — NOREPINEPHRINE BITARTRATE 6.4 MCG: 1 INJECTION, SOLUTION, CONCENTRATE INTRAVENOUS at 08:49

## 2021-11-18 RX ADMIN — SULFAMETHOXAZOLE AND TRIMETHOPRIM 1 TABLET: 400; 80 TABLET ORAL at 17:03

## 2021-11-18 RX ADMIN — CALCIUM CHLORIDE 1 G: 100 INJECTION INTRAVENOUS; INTRAVENTRICULAR at 11:47

## 2021-11-18 RX ADMIN — PIPERACILLIN AND TAZOBACTAM 2.25 G: 3; .375 INJECTION, POWDER, FOR SOLUTION INTRAVENOUS at 12:13

## 2021-11-18 RX ADMIN — CALCIUM CHLORIDE 1 G: 100 INJECTION, SOLUTION INTRAVENOUS at 09:03

## 2021-11-18 RX ADMIN — PROPOFOL 50 MCG/KG/MIN: 10 INJECTION, EMULSION INTRAVENOUS at 23:29

## 2021-11-18 RX ADMIN — DOCUSATE SODIUM 50 MG AND SENNOSIDES 8.6 MG 1 TABLET: 8.6; 5 TABLET, FILM COATED ORAL at 20:22

## 2021-11-18 RX ADMIN — ROCURONIUM BROMIDE 20 MG: 50 INJECTION, SOLUTION INTRAVENOUS at 12:46

## 2021-11-18 RX ADMIN — PROPOFOL 100 MG: 10 INJECTION, EMULSION INTRAVENOUS at 06:54

## 2021-11-18 RX ADMIN — POTASSIUM CHLORIDE 20 MEQ: 1.5 POWDER, FOR SOLUTION ORAL at 20:22

## 2021-11-18 RX ADMIN — CALCIUM CHLORIDE 1 G: 100 INJECTION, SOLUTION INTRAVENOUS at 10:01

## 2021-11-18 RX ADMIN — HUMAN INSULIN 3 UNITS/HR: 100 INJECTION, SOLUTION SUBCUTANEOUS at 18:11

## 2021-11-18 RX ADMIN — VASOPRESSIN 1.2 UNITS/HR: 20 INJECTION INTRAVENOUS at 09:05

## 2021-11-18 RX ADMIN — EPINEPHRINE 0.03 MCG/KG/MIN: 1 INJECTION PARENTERAL at 11:28

## 2021-11-18 RX ADMIN — Medication 0.1 MCG/KG/MIN: at 15:10

## 2021-11-18 RX ADMIN — SUGAMMADEX 200 MG: 100 INJECTION, SOLUTION INTRAVENOUS at 14:34

## 2021-11-18 RX ADMIN — NOREPINEPHRINE BITARTRATE 12.8 MCG: 1 INJECTION, SOLUTION, CONCENTRATE INTRAVENOUS at 11:52

## 2021-11-18 RX ADMIN — ROCURONIUM BROMIDE 50 MG: 50 INJECTION, SOLUTION INTRAVENOUS at 11:27

## 2021-11-18 RX ADMIN — ALBUMIN (HUMAN) 250 ML: 12.5 SOLUTION INTRAVENOUS at 10:15

## 2021-11-18 RX ADMIN — VALGANCICLOVIR HYDROCHLORIDE 450 MG: 50 POWDER, FOR SOLUTION ORAL at 17:03

## 2021-11-18 RX ADMIN — ROCURONIUM BROMIDE 20 MG: 50 INJECTION, SOLUTION INTRAVENOUS at 10:02

## 2021-11-18 RX ADMIN — ALBUMIN (HUMAN) 250 ML: 12.5 SOLUTION INTRAVENOUS at 09:03

## 2021-11-18 RX ADMIN — CALCIUM CHLORIDE 1 G: 100 INJECTION INTRAVENOUS; INTRAVENTRICULAR at 10:56

## 2021-11-18 RX ADMIN — ALBUMIN (HUMAN) 250 ML: 12.5 SOLUTION INTRAVENOUS at 09:27

## 2021-11-18 RX ADMIN — CALCIUM CHLORIDE 1 G: 100 INJECTION, SOLUTION INTRAVENOUS at 11:00

## 2021-11-18 RX ADMIN — SUFENTANIL CITRATE 0.2 MCG/KG/HR: 50 INJECTION EPIDURAL; INTRAVENOUS at 09:59

## 2021-11-18 RX ADMIN — ALBUMIN (HUMAN) 250 ML: 12.5 SOLUTION INTRAVENOUS at 08:58

## 2021-11-18 RX ADMIN — FLUCONAZOLE 400 MG: 2 INJECTION, SOLUTION INTRAVENOUS at 08:19

## 2021-11-18 RX ADMIN — PROPOFOL 30 MCG/KG/MIN: 10 INJECTION, EMULSION INTRAVENOUS at 13:49

## 2021-11-18 RX ADMIN — SODIUM BICARBONATE 50 MEQ: 84 INJECTION, SOLUTION INTRAVENOUS at 11:47

## 2021-11-18 RX ADMIN — PROPOFOL 50 MCG/KG/MIN: 10 INJECTION, EMULSION INTRAVENOUS at 15:04

## 2021-11-18 RX ADMIN — DEXTROSE MONOHYDRATE 25 ML: 25 INJECTION, SOLUTION INTRAVENOUS at 10:55

## 2021-11-18 RX ADMIN — METHYLPREDNISOLONE SODIUM SUCCINATE 1000 MG: 1 INJECTION, POWDER, FOR SOLUTION INTRAMUSCULAR; INTRAVENOUS at 12:13

## 2021-11-18 RX ADMIN — DEXTROSE AND SODIUM CHLORIDE: 5; 450 INJECTION, SOLUTION INTRAVENOUS at 15:07

## 2021-11-18 RX ADMIN — NOREPINEPHRINE BITARTRATE 12.8 MCG: 1 INJECTION, SOLUTION, CONCENTRATE INTRAVENOUS at 10:43

## 2021-11-18 RX ADMIN — DEXTROSE AND SODIUM CHLORIDE: 5; 450 INJECTION, SOLUTION INTRAVENOUS at 23:29

## 2021-11-18 RX ADMIN — ALBUMIN (HUMAN) 250 ML: 12.5 SOLUTION INTRAVENOUS at 12:25

## 2021-11-18 RX ADMIN — NOREPINEPHRINE BITARTRATE 6.4 MCG: 1 INJECTION, SOLUTION, CONCENTRATE INTRAVENOUS at 08:47

## 2021-11-18 RX ADMIN — Medication 50 MCG/HR: at 17:08

## 2021-11-18 RX ADMIN — ALBUMIN (HUMAN) 250 ML: 12.5 SOLUTION INTRAVENOUS at 11:29

## 2021-11-18 RX ADMIN — FENTANYL CITRATE 50 MCG: 50 INJECTION, SOLUTION INTRAMUSCULAR; INTRAVENOUS at 06:54

## 2021-11-18 RX ADMIN — MYCOPHENOLATE MOFETIL 750 MG: 200 POWDER, FOR SUSPENSION ORAL at 17:03

## 2021-11-18 RX ADMIN — NOREPINEPHRINE BITARTRATE 0.04 MCG/KG/MIN: 1 INJECTION, SOLUTION, CONCENTRATE INTRAVENOUS at 08:57

## 2021-11-18 RX ADMIN — ALBUMIN (HUMAN) 25 G: 12.5 INJECTION, SOLUTION INTRAVENOUS at 20:42

## 2021-11-18 ASSESSMENT — ACTIVITIES OF DAILY LIVING (ADL)
ADLS_ACUITY_SCORE: 17
ADLS_ACUITY_SCORE: 7
ADLS_ACUITY_SCORE: 7
ADLS_ACUITY_SCORE: 17
ADLS_ACUITY_SCORE: 7
ADLS_ACUITY_SCORE: 17
ADLS_ACUITY_SCORE: 7
ADLS_ACUITY_SCORE: 17
ADLS_ACUITY_SCORE: 7
ADLS_ACUITY_SCORE: 11
ADLS_ACUITY_SCORE: 7

## 2021-11-18 ASSESSMENT — MIFFLIN-ST. JEOR: SCORE: 1139.26

## 2021-11-18 NOTE — PROGRESS NOTES
I met with the patient and explained the risks benefits and alternatives of liver transplantation.  We discussed about the ventricular tachycardia which occurred during the previous attempt, I explained to her that we have done a full cardiac work-up and if the tachycardia were to recur, we would not proceed with transplantation.  All questions answered.  Donor risk explained as below:  Some donors have risk factors or behaviors that increase their chance of having an infection such as human immunodeficiency virus (HIV), hepatitis B virus (HBV), and/or hepatitis C virus (HCV). This donor has met one or more of the risk criteria in the last 30 days for these infections.  This donor was tested for HIV, HBV, and HCV, which resulted as negative, but as with every test, there is a small chance that the test result was negative even if the virus is present.     A negative test might happen if the donor had a very recent infection.  For this reason, we identify and discuss donors who have potential exposures for HIV, HBV, and/or HCV in the last month that might be missed by testing. The risk for undetected infections is very low but not zero.      Studies show that transplant candidates may have a higher chance of survival by accepting organs from donors with risk factors for these infections compared to waiting for an organ from a donor without these risk factors.      All transplant recipients are tested for HIV, HCV, and HBV after transplant.  In the very rare event that transmission would occur, there are effective therapies available, your medical team would collaborate with a team of infectious disease experts to treat you accordingly.

## 2021-11-18 NOTE — PROGRESS NOTES
"CLINICAL NUTRITION SERVICES - ASSESSMENT NOTE     Nutrition Prescription    RECOMMENDATIONS FOR MDs/PROVIDERS TO ORDER:  Recommend TF in the post-op setting to optimize nutritional status.     Malnutrition Status:    Severe malnutrition in the context of acute on chronic illness    Future/Additional Recommendations:  Tube Feeding Recommendations: TwoCal HN @ 40 mL/hr (960 mL/day) + 1 pkt Prosource daily to provide 1960 kcals (35 kcal/kg/day), 92 g PRO (1.6 g/kg/day), 672 mL H2O, 210 g CHO and 5 g Fiber daily.    Orders supplements (chocolate Ensure Enlive) and calorie counts when appropriate.      REASON FOR ASSESSMENT  Berta Nava is a/an 56 year old female assessed by the dietitian for Provider Order - Pre Op Liver Tx     NUTRITION HISTORY  Per ED notes, patient was coming to ED for confusion/AMS when she received a call for liver transplant.  Per chart review, over the last 3 weeks has had frequent episodes of confusion along with high ammonia levels.      Takes MVI daily.     Patient was seen by RD at OSH ~10 days ago.  Patient had reported at that time that her appetite had been decreased over the past few weeks.  Also deals with early satiety.  Takes Ensure when able.     CURRENT NUTRITION ORDERS  Diet: NPO    LABS  Na 126 (low)  Vit D 53 (normal) - 3/23/21    MEDICATIONS  Lactulose    ANTHROPOMETRICS  Height: 165.1 cm (5' 5\")  Most Recent Weight: 55.8 kg (123 lb 1.6 oz)    IBW: 56.8 kg  BMI: Normal BMI  Weight History:  Overall, weight appears to have trended down recently. Weight loss of 4.4 kg (7%) within the last 9 days.  She did have paracentesis last on 11/15 with 6L removed so likely majority of this weight change is related to that.    Wt Readings from Last 15 Encounters:   11/18/21 55.8 kg (123 lb 1.6 oz)   11/09/21 60.2 kg (132 lb 11.2 oz)   11/01/21 61.7 kg (136 lb)   10/27/21 58.1 kg (128 lb)   10/07/21 59.9 kg (132 lb)   10/01/21 62.6 kg (138 lb 0.1 oz)   09/27/21 63.8 kg (140 lb 11.2 oz) "   09/20/21 63.4 kg (139 lb 12.8 oz)   09/16/21 60.3 kg (132 lb 14.4 oz)   09/09/21 59.4 kg (130 lb 14.4 oz)   08/16/21 62.6 kg (138 lb)   07/22/21 59.4 kg (131 lb)   06/25/21 61.1 kg (134 lb 11.2 oz)   06/21/21 65.5 kg (144 lb 8 oz)   06/12/21 68.4 kg (150 lb 14.4 oz)   Dosing Weight: 56 kg (actual wt)    ASSESSED NUTRITION NEEDS  Estimated Energy Needs: 2647-9533 kcals/day (30 - 35 kcals/kg )  Justification: Increased needs  Estimated Protein Needs:  grams protein/day (1.5 - 2 grams of pro/kg)  Justification: Increased needs  Estimated Fluid Needs: per MD    MALNUTRITION  % Intake: < 75% for > 7 days (moderate) per recent RD assessment  % Weight Loss: Difficult to assess d/t fluid status  Subcutaneous Fat Loss:  Orbital region moderate depletion and Upper arm region moderate to severe depletion - per recent RD assessment  Muscle Loss:  Temporal region moderate depletion, Clavicle bone region moderate to severe depletion and Acromion bone region moderate to severe depletion - per recent RD assessment  Fluid Accumulation/Edema: +ascites (d/t liver disease)  Malnutrition Diagnosis: Severe malnutrition in the context of acute on chronic illness    NUTRITION DIAGNOSIS  Inadequate oral intake related to reduced appetite, early satiety as evidenced by fat/muscle wasting per recent physical exam.       INTERVENTIONS  Implementation  Nutrition Education: Unable to complete due to patient in OR.  RD will follow up for nutrition hx/NFPA when able/appropriate   Enteral Nutrition - see recommendations above     Goals  Diet adv v nutrition support within 2-3 days.     Monitoring/Evaluation  Progress toward goals will be monitored and evaluated per protocol.    Adelita De Luna MS, RD, LD, CCTD  7A/Obs units, pager [phone]

## 2021-11-18 NOTE — ANESTHESIA CARE TRANSFER NOTE
Patient: Berta Nava    Procedure: Procedure(s):  TRANSPLANT, LIVER, RECIPIENT,  DONOR  Bench liver       Diagnosis: End stage liver disease (H) [K72.10]  Diagnosis Additional Information: No value filed.    Anesthesia Type:   General     Note:    Oropharynx: oropharynx clear of all foreign objects, ventilatory support and endotracheal tube in place  Level of Consciousness: iatrogenic sedation  Patient oxygen source: vent.  Supplemental oxygen: vent.  Airway patency satisfactory and stable: ETT.  Dentition: dentition unchanged  Vital Signs Stable: post-procedure vital signs reviewed and stable  Report to RN Given: handoff report given  Patient transferred to: ICU  Comments: VSS on pressors, transported on monitor and sedation, report given to RN.    ICU Handoff: Call for PAUSE to initiate/utilize ICU HANDOFF, Identified Patient, Identified Responsible Provider, Reviewed the Pertinent Medical History, Discussed Surgical Course, Reviewed Intra-OP Anesthesia Management and Issues during Anesthesia, Set Expectations for Post Procedure Period and Allowed Opportunity for Questions and Acknowledgement of Understanding      Vitals:  Vitals Value Taken Time   BP 97/51    Temp     Pulse 76    Resp 14    SpO2 100        Electronically Signed By: CHELSEY Riddle CRNA  2021  2:35 PM

## 2021-11-18 NOTE — PHARMACY-ADMISSION MEDICATION HISTORY
Admission Medication History Completed by Pharmacy    See [facility] Admission Navigator for allergy information, preferred outpatient pharmacy, prior to admission medications and immunization status.     Medication History Sources:     Sure Scripts    Patient and patient's daughter Karin Weinstein [phone]    [facility] Encounter from 11/15/2021 - Marlene Barlow    Changes made to PTA medication list (reason):    Added: None    Deleted: None    Changed: None    Additional Information:    Spoke with patient's daughter Karin. She was with patient and they were able to confirm the patient's medication names, strengths, frequencies, and the last time each was taken.    Patient's daughter reported that the patient was not taking any other prescription or over the counter medications.     Prior to Admission medications    Medication Sig Last Dose Taking? Auth Provider   ciprofloxacin (CIPRO) 500 MG tablet Take 1 tablet (500 mg) by mouth every 24 hours 11/16/2021 at PM Yes Leventhal, Thomas Michael, MD   furosemide (LASIX) 20 MG tablet Take 2 tablets (40 mg) by mouth in the morning and 1 tablet (20 mg) 6 hours later 11/16/2021 Yes Tila Nevarez NP   lactulose (CEPHULAC) 10 GM packet Take 2 packets (20 g) by mouth 2 times daily (Titrate up to 4 times daily if not having at least 2 bowel movements daily) 11/17/2021 at 2000 Yes Leventhal, Thomas Michael, MD   multivitamin (CENTRUM SILVER) tablet Take 1 tablet by mouth daily 11/16/2021 Yes Unknown, Entered By History   omeprazole (PRILOSEC) 20 MG DR capsule Take 2 capsules (40 mg) by mouth 2 times daily 11/16/2021 Yes Leventhal, Thomas Michael, MD   ondansetron (ZOFRAN-ODT) 4 MG ODT tab Take 1 tablet (4 mg) by mouth every 8 hours as needed for nausea 11/16/2021 Yes Tila Nevarez NP   spironolactone (ALDACTONE) 50 MG tablet Take 2 tablets (100 mg) by mouth in the morning and 1 tablet (50 mg) 6 hours later 11/16/2021 Yes Tila Nevarez NP   sucralfate  (CARAFATE) 1 GM tablet Take 1 g by mouth 4 times daily  11/16/2021 Yes Reported, Patient   ursodiol (ACTIGALL) 300 MG capsule Take 1 capsule (300 mg) by mouth 2 times daily 11/16/2021 at PM Yes Leventhal, Thomas Michael, MD       Date completed: 11/17/21    Medication history completed by: Jaime Whitman

## 2021-11-18 NOTE — ANESTHESIA PROCEDURE NOTES
Central Line/PA Catheter Placement    Pre-Procedure   Staff -        CRNA: Marce Esparza APRN CRNA       Performed By: CRNA and anesthesiologist       Location: OR       Procedure Start/Stop Times: 11/18/2021 7:25 AM       Pre-Anesthestic Checklist: patient identified, IV checked, site marked, risks and benefits discussed, informed consent, monitors and equipment checked, pre-op evaluation and at physician/surgeon's request  Timeout:       Correct Patient: Yes        Correct Procedure: Yes        Correct Site: Yes        Correct Position: Yes        Correct Laterality: Yes     Procedure   Procedure: central line       Laterality: right       Insertion Site: internal jugular.  Sterile Prep        All elements of maximal sterile barrier technique followed       Patient Prep/Sterile Barriers: draped, hand hygiene, gloves , hat , mask , draped, gown, sterile gel and probe cover       Skin prep: Chloraprep  Insertion/Injection        Technique: ultrasound guided        1. Ultrasound was used to evaluate the access site.       2. Vein evaluated via ultrasound for patency/adequacy.       3. Using real-time ultrasound the needle/catheter was observed entering the artery/vein.       Introducer Type: 9 Fr, 2-lumen MAC        PA Catheter Type: CCO         Appropriate RV, RA and PA waveforms noted:  Yes            Withdrawn and Locked at cm: 40  Narrative         Secured by: suture       Tegaderm and Biopatch dressing used.       Complications: None apparent,        blood aspirated from all lumens,        All lumens flushed: Yes       Verification method: Ultrasound

## 2021-11-18 NOTE — PLAN OF CARE
Admitted/transferred from:   Time of arrival on unit 1900  2 RN full  skin assessment completed byIvone DOUGLAS and Vania REES  Skin assessment finding: issues found bruising on forearms, healed clamshell incision from 8/2021 -  otherwise WDL.     Interventions/actions: no adjustments needed at this time.     Will continue to monitor.

## 2021-11-18 NOTE — ANESTHESIA PROCEDURE NOTES
Airway       Patient location during procedure: OR       Procedure Start/Stop Times: 11/18/2021 6:56 AM  Staff -        CRNA: Marce Esparza APRN CRNA       Performed By: CRNA  Consent for Airway        Urgency: elective  Indications and Patient Condition       Indications for airway management: gerardo-procedural         Mask difficulty assessment: 1 - vent by mask    Final Airway Details       Final airway type: endotracheal airway       Successful airway: ETT - single  Endotracheal Airway Details        ETT size (mm): 7.5       Cuffed: yes       Successful intubation technique: direct laryngoscopy       DL Blade Type: MAC 3       Grade View of Cords: 1       Adjucts: stylet       Position: Left       Measured from: lips       Secured at (cm): 22       Bite block used: None    Post intubation assessment        Number of attempts at approach: 1       Number of other approaches attempted: 0       Secured with: pink tape       Ease of procedure: easy       Dentition: Intact and Unchanged

## 2021-11-18 NOTE — PROVIDER NOTIFICATION
"Paged MD \"FYI- preop liver pt schduled for OR at 0500. Pt and daughter would like to speak to you if you have a chance to come to the bedside.   Thanks!\"    MD came to bedside to talk with patient and daughter.   "

## 2021-11-18 NOTE — ANESTHESIA PROCEDURE NOTES
Central Line/PA Catheter Placement    Pre-Procedure   Staff -        CRNA: Marce Esparza APRN CRNA       Performed By: anesthesiologist and CRNA       Location: OR       Procedure Start/Stop Times: 11/18/2021 7:15 AM       Pre-Anesthestic Checklist: patient identified, IV checked, site marked, risks and benefits discussed, informed consent, monitors and equipment checked, pre-op evaluation and at physician/surgeon's request  Timeout:       Correct Patient: Yes        Correct Procedure: Yes        Correct Site: Yes        Correct Position: Yes        Correct Laterality: Yes     Procedure   Procedure: central line       Laterality: right       Insertion Site: internal jugular.  Sterile Prep        All elements of maximal sterile barrier technique followed       Patient Prep/Sterile Barriers: draped, hand hygiene, gloves , hat , mask , draped, gown, sterile gel and probe cover       Skin prep: Chloraprep  Insertion/Injection        Technique: ultrasound guided        1. Ultrasound was used to evaluate the access site.       2. Vein evaluated via ultrasound for patency/adequacy.       3. Using real-time ultrasound the needle/catheter was observed entering the artery/vein.       Introducer Type: 9 Fr, 2-lumen MAC        PA Catheter Type: None  Narrative         Secured by: suture       Tegaderm and Biopatch dressing used.       Complications: None apparent,        blood aspirated from all lumens,        All lumens flushed: Yes       Verification method: Ultrasound

## 2021-11-18 NOTE — ANESTHESIA PREPROCEDURE EVALUATION
Anesthesia Pre-Procedure Evaluation    Patient: Berta Nava   MRN: [id number] : 1965        Preoperative Diagnosis: End stage liver disease (H)       (End stage liver disease (H) [K72.10])   Procedure : Procedure(s):  TRANSPLANT, LIVER, RECIPIENT,  DONOR (N/A Abdomen)     Past Medical History:   Diagnosis Date     Ascites      Biliary cirrhosis (H)      Cholangitis, sclerosing      Cirrhosis of liver with ascites (H) 3/3/2021     Hearing loss of left ear     wears a hearing aide     History of low potassium      Hyperlipidemia      Hypertension      SBP (spontaneous bacterial peritonitis) (H) 2021     Sjogren's syndrome (H)      Ulcerative colitis (H)      Ulcerative pancolitis (H)       Past Surgical History:   Procedure Laterality Date     CV CORONARY ANGIOGRAM N/A 2021    Procedure: Coronary Angiogram with possible intervention;  Surgeon: David Wilhelm MD;  Location:  [facility]     ENDOSCOPIC RETROGRADE CHOLANGIOPANCREATOGRAM N/A 2021    Procedure: ENDOSCOPIC RETROGRADE CHOLANGIOPANCREATOGRAPHY with ballon sweep of bile ducts for stones, balloon dilation of bile ducts and bile duct stent placement;  Surgeon: Gregory Gabriel MD;  Location:  OR     ENDOSCOPIC RETROGRADE CHOLANGIOPANCREATOGRAM N/A 2021    Procedure: ENDOSCOPIC RETROGRADE CHOLANGIOPANCREATOGRAPHY STONE REMOVAL, DILATION AND STENT PLACEMENT;  Surgeon: Gregory Gabriel MD;  Location:  OR     ENDOSCOPIC RETROGRADE CHOLANGIOPANCREATOGRAPHY, EXCHANGE TUBE/STENT N/A 2021    Procedure: ENDOSCOPIC RETROGRADE CHOLANGIOPANCREATOGRAPHY WITH STENT EXCHANGE, STONE EXTRACTION, AND DILATION;  Surgeon: Gregory Gabriel MD;  Location:  OR     ENDOSCOPIC RETROGRADE CHOLANGIOPANCREATOGRAPHY, EXCHANGE TUBE/STENT N/A 5/10/2021    Procedure: ENDOSCOPIC RETROGRADE CHOLANGIOPANCREATOGRAPHY with biliary dilation, stone removal, stent exchange;  Surgeon: Gregory Gabriel MD;  Location:  OR     ENDOSCOPIC  RETROGRADE CHOLANGIOPANCREATOGRAPHY, EXCHANGE TUBE/STENT N/A 6/10/2021    Procedure: ENDOSCOPIC RETROGRADE CHOLANGIOPANCREATOGRAPHY, WITH biliary stent exchange, stone removal;  Surgeon: Woodrow Lott MD;  Location: [facility] OR     ENDOSCOPIC RETROGRADE CHOLANGIOPANCREATOGRAPHY, EXCHANGE TUBE/STENT N/A 2021    Procedure: ENDOSCOPIC RETROGRADE CHOLANGIOPANCREATOGRAPHY with biliary dilation, stone removal, stent exchange;  Surgeon: Gregory Gabriel MD;  Location: [facility] OR     ENDOSCOPIC RETROGRADE CHOLANGIOPANCREATOGRAPHY, EXCHANGE TUBE/STENT N/A 10/1/2021    Procedure: ENDOSCOPIC RETROGRADE CHOLANGIOPANCREATOGRAPHY with balloon sweep of bile ducts, bile duct stent exchanged;  Surgeon: Gregory Gabriel MD;  Location: [facility] OR     ESOPHAGOSCOPY, GASTROSCOPY, DUODENOSCOPY (EGD), COMBINED N/A 2021    Procedure: ESOPHAGOGASTRODUODENOSCOPY (EGD);  Surgeon: Leventhal, Thomas Michael, MD;  Location: [facility]     ESOPHAGOSCOPY, GASTROSCOPY, DUODENOSCOPY (EGD), COMBINED N/A 10/1/2021    Procedure: ESOPHAGOGASTRODUODENOSCOPY (EGD) with varices banding;  Surgeon: Gregory Gabriel MD;  Location: [facility] OR     GYN SURGERY      bilat fallopian tubes and ovaries removed     PICC DOUBLE LUMEN PLACEMENT Right 2021    42cm (2cm external), Lateral brachial vein     TRANSPLANT LIVER RECIPIENT  DONOR N/A 2021    Procedure: Opening of abdomen, Abdominal Exploration and aborted liver transplant.;  Surgeon: Ernesto Schmitt MD;  Location: [facility] OR      Allergies   Allergen Reactions     Diagnostic X-Ray Materials Hives     PATIENT HAD HIVE REACTION AFTER ADMINISTERING CT CONTRAST DYE.       Contrast Dye       Social History     Tobacco Use     Smoking status: Never Smoker     Smokeless tobacco: Never Used   Substance Use Topics     Alcohol use: No     Comment: Last drink was 2017      Wt Readings from Last 1 Encounters:   21 57.6 kg (127 lb)        Anesthesia Evaluation   Pt has had prior anesthetic. Type: General and  MAC.    No history of anesthetic complications       ROS/MED HX  ENT/Pulmonary:  - neg pulmonary ROS   (+) sleep apnea,     Neurologic: Comment: Hepatic Encephalopathy/AMS      Cardiovascular: Comment: At the time of prior transplant, she was brought into the hospital on August 18 and was undergoing liver transplantation, but ultimately developed either V. tach or wide-complex atrial fibrillation that was unstable.  During operative course she was shocked at least 5 times, and it was a committee decision that the transplant be aborted given her cardiogenic instability.  She underwent a left heart cath and an MRI of the heart which did not demonstrate any overt abnormalities that would have predisposed her to this condition.  She was treated with amiodarone during the hospital course but electrophysiology felt that she could safely discontinue.      The patient was then seen in EP clinic, at which time she noted overall to be feeling well. At that time, her case was reviewed and at that time and noted for normal post operative evaluations. Evaluated again at that time and felt that overall no additional therapy such as ICD or antiarrhythmics were needed with no contraindication to proceeding with surgery, though could not guarantee repeat recurrence though could consider IV amiodarone (though no data to support this).    (+) Dyslipidemia hypertension-----dysrhythmias (8/19/21 developed unstable Vtach vs SVT during liver transplant - shocked 5 times before return of NSR), Previous cardiac testing   Echo: Date: 8/19/21 Results:  Global and regional left ventricular function is normal with an EF of 60-65%.  Global right ventricular function is normal. The right ventricle is normal  size.Global and regional left ventricular function is normal with an EF of 60-65%.  Global right ventricular function is normal. The right ventricle is normal  size.  No significant valvular abnormalities.  IVC diameter <2.1 cm collapsing  >50% with sniff suggests a normal RA pressure  of 3 mmHg.  Ascites is noted.  This study was compared with the study from 04/20/2021 (resting tomograms from  stress study). No significant changes noted.  No significant valvular abnormalities.  IVC diameter <2.1 cm collapsing >50% with sniff suggests a normal RA pressure  of 3 mmHg.  Ascites is noted.  This study was compared with the study from 04/20/2021 (resting tomograms from  stress study). No significant changes noted.  Stress Test: Date: Results:    ECG Reviewed: Date: Results:    Cath: Date: 8/25/21 Results:  Left Main  The vessel was visualized by selective angiography. There was 0% vessel disease.  Left Anterior Descending  The vessel was visualized by selective angiography. There was 0% vessel disease.  Left Circumflex  The vessel was visualized by selective angiography. There was 0% vessel disease.  Right Coronary Artery  The vessel was visualized by selective angiography. There was 0% vessel disease.        METS/Exercise Tolerance: 1 - Eating, dressing    Hematologic:  - neg hematologic  ROS     Musculoskeletal:  - neg musculoskeletal ROS     GI/Hepatic: Comment: Primary Sclerosing Cholangitis, Ulcerative pancolitis, ascites/ cirrhosis. Esophageal varices without bleeding.  Now s/p aborted liver transplant for unexplained CV collapse    requiring q week paracentesis with 5 -6 L vol removal (last 11/15),     (+) Inflammatory bowel disease, cholecystitis/cholelithiasis, liver disease,     Renal/Genitourinary:    (-) renal disease   Endo:  - neg endo ROS  (-) Type I DM and Type II DM   Psychiatric/Substance Use:  - neg psychiatric ROS     Infectious Disease: Comment: Admited 4/29/21 with sepsis, SBP, and cholangitis s/p stent placement.  - neg infectious disease ROS     Malignancy:  - neg malignancy ROS     Other: Comment: She was recently hospitalized 11/6-11/9 for complications with her Cirrhosis which included HE and GONZALO           Physical Exam    Airway         Mallampati: II   TM distance: > 3 FB   Neck ROM: full   Mouth opening: > 3 cm    Respiratory Devices and Support         Dental  no notable dental history         Cardiovascular          Rhythm and rate: regular and normal     Pulmonary   pulmonary exam normal        breath sounds clear to auscultation           OUTSIDE LABS:  CBC:   Lab Results   Component Value Date    WBC 13.2 (H) 11/17/2021    WBC 13.1 (H) 11/09/2021    HGB 10.8 (L) 11/17/2021    HGB 9.0 (L) 11/09/2021    HCT 31.5 (L) 11/17/2021    HCT 28.0 (L) 11/09/2021     11/17/2021     11/09/2021     BMP:   Lab Results   Component Value Date     (L) 11/17/2021     (L) 11/11/2021    POTASSIUM 4.5 11/17/2021    POTASSIUM 4.1 11/09/2021    CHLORIDE 95 11/17/2021    CHLORIDE 92 (L) 11/09/2021    CO2 19 (L) 11/17/2021    CO2 21 11/09/2021    BUN 27 11/17/2021    BUN 28 11/09/2021    CR 0.92 11/17/2021    CR 0.90 11/11/2021     (H) 11/17/2021     (H) 11/09/2021     COAGS:   Lab Results   Component Value Date    PTT 32 11/17/2021    INR 1.24 (H) 11/17/2021    FIBR 661 (H) 11/17/2021     POC:   Lab Results   Component Value Date    BGM 95 06/25/2021     HEPATIC:   Lab Results   Component Value Date    ALBUMIN 2.5 (L) 11/17/2021    PROTTOTAL 7.4 11/17/2021    ALT 37 11/17/2021    AST 59 (H) 11/17/2021    ALKPHOS 303 (H) 11/17/2021    BILITOTAL 6.6 (H) 11/17/2021    MIHIR 78 (H) 11/17/2021     OTHER:   Lab Results   Component Value Date    PH 7.46 (H) 10/25/2021    LACT 1.6 11/17/2021    A1C 4.3 04/29/2021    MARIELENA 9.8 11/17/2021    PHOS 3.5 11/17/2021    MAG 2.3 11/17/2021    LIPASE 248 10/25/2021    AMYLASE 81 11/17/2021    CRP 54.0 (H) 11/17/2021       Anesthesia Plan    ASA Status:  4   NPO Status:  NPO Appropriate    Anesthesia Type: General.     - Airway: ETT   Induction: Intravenous.   Maintenance: Balanced.   Techniques and Equipment:     - Lines/Monitors: 2nd IV, Arterial Line, Central Line, PAC, BIS, NIRS, PALMA,  CVP     - Blood: Blood in Room, PRBC, FFP, PLT, T&S     - Drips/Meds: Norepi, Vasopressin, Epinephrine (Calcium)     Consents    Anesthesia Plan(s) and associated risks, benefits, and realistic alternatives discussed. Questions answered and patient/representative(s) expressed understanding.    - Discussed:     - Discussed with:  Patient      - Extended Intubation/Ventilatory Support Discussed: Yes.      - Patient is DNR/DNI Status: No    Use of blood products discussed: Yes.     - Discussed with: Patient.     - Consented: consented to blood products            Reason for refusal: other.     Postoperative Care    Pain management: IV analgesics.     - Plan for long acting post-op opioid use   PONV prophylaxis: Ondansetron (or other 5HT-3), Dexamethasone or Solumedrol     Comments:                    Eden Stein MD

## 2021-11-18 NOTE — ANESTHESIA PROCEDURE NOTES
Perioperative PALMA Procedure Note    Staff -        Anesthesiologist:  Alfredo Justice DO       Performed By: anesthesiologist  Preanesthesia Checklist:  Patient identified, IV assessed, risks and benefits discussed, monitors and equipment assessed, procedure being performed at surgeon's request and anesthesia consent obtained.    PALMA Probe Insertion    Probe Status PRE Insertion: NO obvious damage  Probe type:  Adult 2D  Bite block used:   Soft  Insertion Technique: Easy, no oropharyngeal manipulation  Insertion complications: None obvious  Billing Report:PALMA report by Anesthesiologist (See Separate Report note)  Probe Status POST Removal: NO obvious damage        Post Intervention Findings  Procedure(s) performed:  Other (see comments). Regional wall motion:. Surgeon(s) notified of all postintervention findings: Yes.       Echocardiogram Comments

## 2021-11-18 NOTE — BRIEF OP NOTE
[facility]    Brief Operative Note    Pre-operative diagnosis: End stage liver disease (H) [K72.10]  Post-operative diagnosis Same as pre-operative diagnosis    Procedure: Procedure(s):  TRANSPLANT, LIVER, RECIPIENT,  DONOR  Bench liver  Surgeon: Surgeon(s) and Role:     * Ernesto Schmitt MD - Primary     * Alan Morales MD - Fellow - Assisting  Anesthesia: General   Estimated Blood Loss: 10L    Drains: abthera  Specimens:   ID Type Source Tests Collected by Time Destination   1 : native liver Tissue Liver SURGICAL PATHOLOGY EXAM Ernesto Schmitt MD 2021 10:56 AM    2 : donor gallbladder Tissue Gallbladder SURGICAL PATHOLOGY EXAM Ernesto Schmitt MD 2021 12:52 PM    A : acities Body fluid, unsp Other ANAEROBIC BACTERIAL CULTURE ROUTINE, GRAM STAIN, FUNGAL OR YEAST CULTURE ROUTINE, AEROBIC BACTERIAL CULTURE ROUTINE Ernesto Schmitt MD 2021  8:40 AM    B : abdominal abscess Abscess Other ANAEROBIC BACTERIAL CULTURE ROUTINE, GRAM STAIN, FUNGAL OR YEAST CULTURE ROUTINE, AEROBIC BACTERIAL CULTURE ROUTINE Ernesto Schmitt MD 2021  9:55 AM      Findings:   cirrhotic liver, adhesions from prior operation.  Complications: packed due to coagualopathy.  Implants:   Implant Name Type Inv. Item Serial No.  Lot No. LRB No. Used Action   NeoConnect Polyurethane (PUR) Feeding Tube with ENFit connector (radiopaque)     [id number] N/A 1 Implanted

## 2021-11-18 NOTE — ANESTHESIA PROCEDURE NOTES
Arterial Line Procedure Note    Pre-Procedure   Staff -        Anesthesiologist:  Alfredo Justice DO       Performed By: anesthesiologist       Location: OR       Pre-Anesthestic Checklist: patient identified, IV checked, risks and benefits discussed, informed consent, monitors and equipment checked, pre-op evaluation and at physician/surgeon's request  Timeout:       Correct Patient: Yes        Correct Procedure: Yes        Correct Site: Yes        Correct Position: Yes   Procedure   Procedure: arterial line       Laterality: right       Insertion Site: brachial.  Sterile Prep        Standard elements of sterile barrier followed       Skin prep: Chloraprep  Insertion/Injection        Technique: ultrasound guided        1. Ultrasound was used to evaluate the access site.       2. Artery evaluated via ultrasound for patency/adequacy.       3. Using real-time ultrasound the needle/catheter was observed entering the artery/vein.       Catheter Type/Size: 20 G, 1.75 in/4.5 cm quick cath (integral wire)  Narrative        Tegaderm dressing used.       Complications: None apparent,        Arterial waveform: Yes        IBP within 10% of NIBP: Yes

## 2021-11-18 NOTE — PLAN OF CARE
"/56 (BP Location: Left arm, Patient Position: Supine)   Pulse 77   Temp 98.7  F (37.1  C) (Oral)   Resp 22   Ht 1.651 m (5' 5\")   Wt 57.6 kg (127 lb)   LMP  (LMP Unknown)   SpO2 99%   BMI 21.13 kg/m      Shift: 1379-6878  VS: Softer Bps otherwise VSS on RA, afebrile  Neuro: Aox4 - On Reed precautions but scoring zero overnight.   Labs: Elevated LFTs. Triggered sepsis at 0342, lactic came back at   Respiratory: WDL.   Pain/Nausea/PRN: Denies pain and nausea.   Diet: NPO - surgery scheduled for this AM.   LDA: PIV SL.   GI/: No BM this shift. Voids spontaneously - minimal output overnight.   Skin: Slightly jaundiced. Healed clamshell incision from failed liver transplant in August 2021. Bruising on forearms DENNYS.   Mobility: Assist x1.   Plan: OR time 0600 for DDLT.     Handoff given to following RN.    "

## 2021-11-18 NOTE — H&P
SURGICAL ICU ADMISSION NOTE  11/18/2021  Critical Care Services Progress Note:     Berta Nava remains critically ill with recent liver transplant for sclerosing cholangitis.  1 month ago, a transplant procedure was associated with cardiac arrest.  Remarkably, work-up was unremarkable.  At her transplant today, she was noted to have an abscess in one adrenal gland.  Her abdomen was left open.    She received 2000 mL of albumin and 8 L of crystalloid along with 22 units of packed red cells, 13 units of fresh frozen plasma 3 units of platelets and 2 units of cryoprecipitate.  She will return to the operating room in 48 hours for abdominal closure.  She remains on vasoactive drugs.  I personally examined and evaluated the patient today.   The patient s prognosis today is improved with successful completion of her liver transplant.    I have evaluated all laboratory values and imaging studies from the past 24 hours.  Key findings and decisions made today included we will continue support without changing lines or altering mechanical ventilation pending return to the operating room for this patient.  I personally managed the mechanical ventilation, fluids, analgesia and sedation.  Consults ongoing and ordered are Transplant Services, Pharmacy and Nutrition.    Procedures that will happen today are ongoing mechanical ventilation and serial laboratory monitoring.    All treatments were placed at my direction.  I formulated today s plan with Dr. Cruz and the house staff team or resident(s) and agree with the findings and plan in the associated note.     The above plans and care have been discussed with available family members and all questions and concerns were addressed.   I spent a total of 30 minutes (excluding procedure time) personally providing and directing critical care services at the bedside and on the critical care unit for Berta Nava.        Henry Cuba MD        PRIMARY TEAM: Transplant  team  PRIMARY PHYSICIAN: Dr. Schmitt    REASON FOR CRITICAL CARE ADMISSION: Post operative management.   ADMITTING PHYSICIAN: PRIYA DUNCAN MD    ASSESSMENT: Berta Nava is a 56 year old female with PMH of primary sclerosing cholangitis s/p ERCP, cirrhosis c/b ascites who presents with HE/AMS for potential liver transplant. The patient was scheduled to get liver txp one month ago, however, upon induction of anesthesia, the patient went into unstable Vtach and the case was aborted.  In the OR, EBL was 10 L; she received 22 U of blood, 3 of platelets, 13 FFP, 2 cryo, crystalloid 8cc  and 2400cc of cellsaver. Her abdomen was temporally closed with VAC and came to the floor with propofol gtt and intubated. She will RTOR on 11/20/21.    MELD-Na score: 25 at 11/17/2021  2:01 PM  MELD score: 16 at 11/17/2021  2:01 PM  Calculated from:  Serum Creatinine: 0.92 mg/dL (Using min of 1 mg/dL) at 11/17/2021  2:01 PM  Serum Sodium: 126 mmol/L at 11/17/2021  2:01 PM  Total Bilirubin: 6.6 mg/dL at 11/17/2021  2:01 PM  INR(ratio): 1.24 at 11/17/2021  2:01 PM  Age: 56 years    PLAN:   Neuro/ pain/ sedation:  # Postoperative pain management  - Monitor neurological status. Notify the MD for any acute changes in exam.  - Acute pain: Fentanyl 25-50 gtt and PRN; wean as tolerated   - Sedation: Propofol 5-75 mcg/kg/hr; wean as tolerated   # Hepatic encephalopathy / AMS  - Hold PTA lactulose     Pulmonary care:   # postoperative respiratory support  - Supplemental oxygen to keep saturation above 92 %.  - Vent settings: CMV/AC  Ventilation Mode: CMV/AC  (Continuous Mandatory Ventilation/ Assist Control)  Rate Set (breaths/minute): 14 breaths/min  Tidal Volume Set (mL): 450 mL  PEEP (cm H2O): 5 cmH2O  Oxygen Concentration (%): 100 %  Resp: 22  - Post op ABG 7.42/37/130/24  - Wean FiO2 as tolerated; consider extubation  - CXR pending  - keep intubated till 11/20/21     Cardiovascular:    # Post op hemodynamic management  # Hx of Vtach  -  Monitor hemodynamic status.   - CVP goal 8< CVP<12  - Maintain MAP > 65  - Pressor: Norepi 0.01; vaso 2 U. Turn off vaso and titrate norepi  - Monitor hemodynamic status     GI/Nutrition:   # Primary sclerosing cholangitis c/b strictures s/p ERCP  # S/P DDLT   - NPO except medications.  - OG in place  - Hold PTA omeprazole  - Hold ursodiol   - Hold sucralfate      Renal/Fluids/ Electrolytes:   # hx of GONZALO  - D5 1/2 NS at 100ml/hr  for IV fluid hydration  - Hold PTA lasix and Spironolactone  - ICU electrolyte replacement protocol  - Urine output 700 intraop  - Will continue to monitor intake and output.     Endocrine:    # Stress induced hyperglycemia  # Incidental adrenal cyst s/p drainage   - Insulin gtt to keep BS <180    - Serum cortisol, aldosterone     ID/ Antibiotics:  # Post transplant prophylaxis   - Zosyn 48hr  - Trimethoprim-sulfamethoxazole for PCP prophylaxis  - Ganciclovir for CMV ppx      Heme:     # Anemia due to blood loss  # Coagulopathy  - Hemoglobin stable at 10.3   - Preop Hgb 10.8  - Post op INR 2.0, Platelets 137, Fibrinogen 218; no need for blood products at this time  - Monitor hgb; transfuse if Hgb < 7  - Monitor fibrinogen; fibrinogen goal >200  - Monitor platelet; replace if platelet < 50,000  - INR goal <2  - q12h CBC, q4h lactate, q4h BMP     Prophylaxis:    -Mechanical prophylaxis for DVT.   -No chemical DVT prophylaxis due to high risk of bleeding.   -GI ppx: Protonic BID     MSK:    - PT and OT consulted. Appreciate recs.     Lines/ tubes/ drains:  - ETT, OGT, [place], [place]      Disposition:  -Surgical ICU.     Patient seen, findings and plan discussed with surgical ICU staff.    Mayda Mast, MS4    Physician Attestation   I, Miriam Cruz, was present with the medical student who participated in the service and in the documentation of the note.  I have verified the history and personally performed the physical exam and medical decision making.  I agree with the assessment and  plan of care as documented in the note.      Miriam Cruz MD  Date of Service (when I saw the patient): 11/18/21    - - - - - - - - - - - - - - - - - - - - - - - - - - - - - - - - - - - - - - - - - - - - - - - - - - - - - - - - - - - - - - - - - - - - - - - -     HISTORY PRESENTING ILLNESS:    Berta Nava is a 56 year old female with PMH of primary sclerosing cholangitis s/p ERCP, cirrhosis c/b ascites who presents with HE/AMS for potential liver transplant. Medical history is also significant for UC currently managed with mesalamine, biliary strictures and choledocholithiasis, recurrent Cholangitis s/p ERCP. She was recently hospitalized on 11/6-11/9 for HE and GONZALO 2/2 to cirrhosis. She had a failed attempt of liver transplantation on 08/21. She presented to ED on 11/18 with altered mental status.  In the OR, EBL was 10 L; she received 22 U of blood, 3 of platelets, 13 FFP, 2 cryo, crystalloid 8cc  and 2400cc of cellsaver. Her abdomen was temporally closed with VAC and came to the floor with propofol gtt and intubated.     REVIEW OF SYSTEMS: 10 point ROS neg other than the symptoms noted above in the HPI.    PAST MEDICAL HISTORY:    has a past medical history of Ascites, Biliary cirrhosis (H), Cholangitis, sclerosing, Cirrhosis of liver with ascites (H) (3/3/2021), Hearing loss of left ear, History of low potassium, Hyperlipidemia, Hypertension, SBP (spontaneous bacterial peritonitis) (H) (4/30/2021), Sjogren's syndrome (H), Ulcerative colitis (H), and Ulcerative pancolitis (H).She has no past medical history of Diabetes (H) or History of blood transfusion.    SURGICAL HISTORY:    has a past surgical history that includes GYN surgery; Endoscopic Retrograde Cholangiopancreatography, Exchange Tube/Stent (N/A, 05/05/2021); PICC/Midline Placement (Right, 05/08/2021); Endoscopic Retrograde Cholangiopancreatography, Exchange Tube/Stent (N/A, 5/10/2021); Endoscopic Retrograde Cholangiopancreatography, Exchange  Tube/Stent (N/A, 6/10/2021); Endoscopic retrograde cholangiopancreatogram (N/A, 2021); Endoscopic retrograde cholangiopancreatogram (N/A, 2021); Transplant liver recipient  donor (N/A, 2021); Coronary Angiogram (N/A, 2021); Endoscopic Retrograde Cholangiopancreatography, Exchange Tube/Stent (N/A, 2021); Esophagoscopy, gastroscopy, duodenoscopy (EGD), combined (N/A, 2021); Endoscopic Retrograde Cholangiopancreatography, Exchange Tube/Stent (N/A, 10/1/2021); and Esophagoscopy, gastroscopy, duodenoscopy (EGD), combined (N/A, 10/1/2021).    SOCIAL HISTORY:    reports that she has never smoked. She has never used smokeless tobacco. She reports that she does not drink alcohol and does not use drugs.    FAMILY HISTORY: No bleeding/clotting disorders nor problems with anesthesia.     ALLERGIES:      Allergies   Allergen Reactions     Diagnostic X-Ray Materials Hives     PATIENT HAD HIVE REACTION AFTER ADMINISTERING CT CONTRAST DYE.       Contrast Dye        MEDICATIONS:  No current facility-administered medications on file prior to encounter.  ciprofloxacin (CIPRO) 500 MG tablet, Take 1 tablet (500 mg) by mouth every 24 hours  furosemide (LASIX) 20 MG tablet, Take 2 tablets (40 mg) by mouth in the morning and 1 tablet (20 mg) 6 hours later  lactulose (CEPHULAC) 10 GM packet, Take 2 packets (20 g) by mouth 2 times daily (Titrate up to 4 times daily if not having at least 2 bowel movements daily)  multivitamin (CENTRUM SILVER) tablet, Take 1 tablet by mouth daily  omeprazole (PRILOSEC) 20 MG DR capsule, Take 2 capsules (40 mg) by mouth 2 times daily  ondansetron (ZOFRAN-ODT) 4 MG ODT tab, Take 1 tablet (4 mg) by mouth every 8 hours as needed for nausea  spironolactone (ALDACTONE) 50 MG tablet, Take 2 tablets (100 mg) by mouth in the morning and 1 tablet (50 mg) 6 hours later  sucralfate (CARAFATE) 1 GM tablet, Take 1 g by mouth 4 times daily   ursodiol (ACTIGALL) 300 MG capsule, Take 1  capsule (300 mg) by mouth 2 times daily        PHYSICAL EXAMINATION:  Temp:  [97.5  F (36.4  C)-98.7  F (37.1  C)] 98.7  F (37.1  C)  Pulse:  [74-79] 77  Resp:  [15-26] 22  BP: (100-133)/(56-79) 100/56  SpO2:  [99 %-100 %] 99 %  @[facility]@    LABS: Reviewed.   Arterial Blood Gases   Recent Labs   Lab 11/18/21  1123 11/18/21  1053 11/18/21  1013 11/18/21  0941   PH 7.32* 7.33* 7.31* 7.30*   PCO2 35 35 39 32*   PO2 159* 164* 156* 147*   HCO3 18* 18* 20* 16*     Complete Blood Count   Recent Labs   Lab 11/18/21  1123 11/18/21  1053 11/18/21  1013 11/18/21  0941 11/18/21  0831 11/17/21  1401   WBC  --   --   --   --   --  13.2*   HGB 9.4* 9.5* 10.7* 7.1*   < > 10.8*   PLT  --   --   --   --   --  318    < > = values in this interval not displayed.     Basic Metabolic Panel  Recent Labs   Lab 11/18/21  1123 11/18/21  1053 11/18/21  1013 11/18/21  0941 11/18/21  0831 11/17/21  1401 11/11/21  1449    132* 132* 130*   < > 126* 122*   POTASSIUM 4.3 4.7 4.8 4.3   < > 4.5  --    CHLORIDE  --   --   --   --   --  95  --    CO2  --   --   --   --   --  19*  --    BUN  --   --   --   --   --  27  --    CR  --   --   --   --   --  0.92 0.90   * 174* 192* 130*   < > 103*  --     < > = values in this interval not displayed.     Liver Function Tests  Recent Labs   Lab 11/17/21  1401 11/11/21  1449   AST 59*  --    ALT 37  --    ALKPHOS 303*  --    BILITOTAL 6.6* 6.6*   ALBUMIN 2.5* 2.0*   INR 1.24* 1.20*     Pancreatic Enzymes  Recent Labs   Lab 11/17/21  1401   AMYLASE 81     Coagulation Profile  Recent Labs   Lab 11/17/21  1401 11/11/21  1449   INR 1.24* 1.20*   PTT 32  --      Lactate  Invalid input(s): LACTATE    IMAGING:  Recent Results (from the past 24 hour(s))   Head CT w/o contrast    Narrative    CT HEAD W/O CONTRAST 11/17/2021 3:00 PM    History: Mental status change, unknown cause     Comparison: 10/25/2021    Technique: Using multidetector thin collimation helical acquisition  technique, axial, coronal and  sagittal CT images from the skull base  to the vertex were obtained without intravenous contrast.    Findings: There is no intracranial hemorrhage, mass effect, or midline  shift. Gray/white matter differentiation in both cerebral hemispheres  is preserved. Ventricles are proportionate to the cerebral sulci. The  basal cisterns are clear.    The bony calvaria and the bones of the skull base are normal. The  visualized portions of the paranasal sinuses and mastoid air cells are  clear. Multiple scalp epidermal inclusion cysts, the largest is  calcified at the left base of the occiput.    Atherosclerotic calcifications of the bilateral left greater than  right carotid siphons as well as vertebral arteries.      Impression    Impression:  No acute intracranial pathology.     I have personally reviewed the examination and initial interpretation  and I agree with the findings.    TAY CAMPUZANO MD         SYSTEM ID:  [id number]   POC US Guide for Paracentesis    Impression    POCUS Abdomen    Indication: Evaluate for ascites for safe site for paracentesis    Findings:  RLQ: Large volume ascites  needle tip was shown at peritoneum before entry within fluid pocket of abdominal cavity.      XR Chest 2 Views    Narrative    Exam: XR CHEST 2 VW, 11/17/2021 5:32 PM    Indication: Pre-op DDLT per protocol    Comparison: 11/6/2021    Findings:   Cardiac silhouette is normal size. Low lung volumes. No focal airspace  opacities. No pneumothorax. No pleural effusion. No acute osseous  abnormalities. Upper abdomen unremarkable.      Impression    Impression: No focal airspace opacities.    I have personally reviewed the examination and initial interpretation  and I agree with the findings.    KADIE PRITCHARD MD         SYSTEM ID:  [id number]

## 2021-11-18 NOTE — CONSULTS
Transplant Admission Psychosocial Assessment    Patient Name: Berta Nava  : 1965  Age: 56 year old  MRN: [id number]  Date of Initial Social Work Evaluation: 2021    Patient is in the OR for a  donor liver transplant. Patient presented for a liver transplant in August and procedure was aborted when she went into tachy arrhythmia. I spoke with Berta's spouse, Demar to update psychosocial assessment and provide education about SW role while inpatient, and to begin discussion of expectations/requirements, caregiver needs and follow up needs post-transplant.     Presenting Information   Living Situation: Berta lives with her spouse, Demar in [place], [place].   If not local, plans for short term stay:  Berta lives local   Previous Functional Status: No functional concerns   Cultural/Language/Spiritual Considerations: She identifies as Taoist and her primary language is English     Support System  Primary Support Person Spouse, Demar  Other support:  Children, Karin ([place], [place]), Linden ([facility]) and sister Nadege ([place], [place]).   Plan for support in immediate post-transplant period: Spouse and family will assist.     Health Care Directive  Decision Maker: Patient when able   Alternate Decision Maker: Per health care directive Demar, and daughter Karin   Health Care Directive: Copy in Chart    Mental Health/Coping:   History of Mental Health: Berta does not have a history of any mental health concerns   History of Chemical Health: Berta denies any history of using tobacco products, pain medication abuse or illicit drug use.  Berta denies any history of alcohol abuse or dependency.  She reports her last consumption of alcohol was over six years ago.  Berta has no history of chemical dependency treatment, legal consequences of alcohol or drug use.  Current status: Stable   Coping: Spouse coping appropriate to situation. He reported he is doing OK, and hoping for the best during this  surgery. Demar indicated that him and his family have been taking it one minute at a time.   Services Needed/Recommended: Liver transplant support group    Financial   Income: Berta and her spouse both have income  Impact of transplant on income: Minimal  Insurance and medication coverage: [employer]  Financial concerns: None at this time   Resources needed: None at this time     Education provided by SW: Social Work role inpatient setting, availability of support groups, and parking information     Assessment and recommendations and plan:  This writer is covering for primary , Helene Denney. I called patient's spouse to update psychosocial assessment and provide supportive counseling. He denied any questions at this time and is focusing on being positive and hopeful for today's surgery. He voiced that he would appriviate check in when pt is out of surgery.     This writer confirmed that he has primary 's phone number    JOSETTE Henriquez, LICSW  Liver Transplant   [facility]  Phone: [phone]  Pager: [phone]

## 2021-11-18 NOTE — PROGRESS NOTES
Pt admitted to 4A ICU s/p Liver Transplant. Pt placed on a CMV 14 450 100% +5. Please see flowsheets for further information.

## 2021-11-18 NOTE — TELEPHONE ENCOUNTER
Organ Offer Encounter Information    Organ Offer Information  Organ offer date & time: 11/17/2021 12:01 PM  Coordinator/Fellow/Attending name: Barbara Worrell RN   Organ(s):  Organ UNOS ID Match Run ID Comment Organ Laterality   Liver [id number] [place]       Organ disposition: Transplanted  Additional Comments: 11/17/2021 12:02 PM  Liver: Primary local offer  MD: Jake  OPO Contact: Nereyda  Donor/Recip HCV Status: Negative/Negative  (HCV+ Donors - Discuss HCV genotyping/quant testing with MD & send message to [facility] HCV POOL - Include Donor UNOS ID)  Donor Nutritional Status: D5 @ 125cc/hr  Plan (NPO, Donor OR): Requested donor OR of 1000 on 11/18.  - - -   COVID Screening  In the past month, have you had:  Any close contact with a suspect or laboratory-confirmed COVID-19 patient: No  Travel anywhere: No  COVID Symptoms (Fever, Cough, Short of Breath, Loss of Taste/Smell, Rash): No    Admissions: 1218 - [place], [facility] 3pm  Unit: 1210 - [place], will have a bed after 3pm  Update Provider Entering Orders (XM Plan & COVID Testing):  1215 - Adelita Gomez NP  Immunology: 1725 - [place]  Inpatient Lab (COVID Testing [phone], Option 2): COVID done in ED today  Book OR: 1730 - [place], booked for 0500  Vessel Storage Confirmation (PA/KP/LI): Ok to bank  Blood Bank: 1800 - [facility]  Research: ON HOLD  TransNet/ABO Verification: printed @ 1700  Add Organ: Done @ 1615    Donor OR Time: 0200 11/18  Procuring MD: Dr. Bullard/Dr. Blackwood  Contact in the OR: tbd  Organs Being Procured: Heart, Liver  Flush Solution: UW  Biopsy: Pathology on standby  Pump: N/A  Special Requests (Special blood tubes, nodes, waivers): N/A  MD for Visualization: Dr. Schmitt  Transportation Details: Pickup at 0130 outside ED, Dr. Bullard aware    Attestation I have discussed all of the above with the Patient/Legal Guardian/Caregiver regarding this organ offer.: Yes  Coordinator/Fellow/Attending name: Barbara Worrell RN

## 2021-11-19 ENCOUNTER — APPOINTMENT (OUTPATIENT)
Dept: ULTRASOUND IMAGING | Facility: CLINIC | Age: 56
DRG: 005 | End: 2021-11-19
Payer: COMMERCIAL

## 2021-11-19 ENCOUNTER — APPOINTMENT (OUTPATIENT)
Dept: GENERAL RADIOLOGY | Facility: CLINIC | Age: 56
DRG: 005 | End: 2021-11-19
Attending: DIETITIAN, REGISTERED
Payer: COMMERCIAL

## 2021-11-19 ENCOUNTER — ANESTHESIA EVENT (OUTPATIENT)
Dept: SURGERY | Facility: CLINIC | Age: 56
DRG: 005 | End: 2021-11-19
Payer: COMMERCIAL

## 2021-11-19 ENCOUNTER — ANESTHESIA (OUTPATIENT)
Dept: SURGERY | Facility: CLINIC | Age: 56
DRG: 005 | End: 2021-11-19
Payer: COMMERCIAL

## 2021-11-19 ENCOUNTER — APPOINTMENT (OUTPATIENT)
Dept: GENERAL RADIOLOGY | Facility: CLINIC | Age: 56
DRG: 005 | End: 2021-11-19
Attending: TRANSPLANT SURGERY
Payer: COMMERCIAL

## 2021-11-19 LAB
ALBUMIN SERPL-MCNC: 2.4 G/DL (ref 3.4–5)
ALBUMIN SERPL-MCNC: 2.5 G/DL (ref 3.4–5)
ALP SERPL-CCNC: 34 U/L (ref 40–150)
ALP SERPL-CCNC: 36 U/L (ref 40–150)
ALT SERPL W P-5'-P-CCNC: 117 U/L (ref 0–50)
ALT SERPL W P-5'-P-CCNC: 140 U/L (ref 0–50)
AMMONIA PLAS-SCNC: 26 UMOL/L (ref 10–50)
AMYLASE SERPL-CCNC: 36 U/L (ref 30–110)
ANION GAP SERPL CALCULATED.3IONS-SCNC: 7 MMOL/L (ref 3–14)
ANION GAP SERPL CALCULATED.3IONS-SCNC: 7 MMOL/L (ref 3–14)
APTT PPP: 33 SECONDS (ref 22–38)
APTT PPP: 33 SECONDS (ref 22–38)
APTT PPP: 34 SECONDS (ref 22–38)
AST SERPL W P-5'-P-CCNC: 69 U/L (ref 0–45)
AST SERPL W P-5'-P-CCNC: 98 U/L (ref 0–45)
BASE EXCESS BLDA CALC-SCNC: -3.2 MMOL/L (ref -9–1.8)
BASE EXCESS BLDA CALC-SCNC: -3.8 MMOL/L (ref -9–1.8)
BASE EXCESS BLDA CALC-SCNC: -4 MMOL/L (ref -9–1.8)
BASE EXCESS BLDV CALC-SCNC: -4 MMOL/L (ref -7.7–1.9)
BASOPHILS # BLD AUTO: 0 10E3/UL (ref 0–0.2)
BASOPHILS # BLD AUTO: 0 10E3/UL (ref 0–0.2)
BASOPHILS NFR BLD AUTO: 0 %
BASOPHILS NFR BLD AUTO: 0 %
BILIRUB DIRECT SERPL-MCNC: 0.8 MG/DL (ref 0–0.2)
BILIRUB DIRECT SERPL-MCNC: 0.8 MG/DL (ref 0–0.2)
BILIRUB SERPL-MCNC: 1.2 MG/DL (ref 0.2–1.3)
BILIRUB SERPL-MCNC: 1.3 MG/DL (ref 0.2–1.3)
BUN SERPL-MCNC: 21 MG/DL (ref 7–30)
BUN SERPL-MCNC: 24 MG/DL (ref 7–30)
CA-I BLD-MCNC: 4.9 MG/DL (ref 4.4–5.2)
CALCIUM SERPL-MCNC: 8.1 MG/DL (ref 8.5–10.1)
CALCIUM SERPL-MCNC: 8.6 MG/DL (ref 8.5–10.1)
CHLORIDE BLD-SCNC: 111 MMOL/L (ref 94–109)
CHLORIDE BLD-SCNC: 111 MMOL/L (ref 94–109)
CO2 SERPL-SCNC: 20 MMOL/L (ref 20–32)
CO2 SERPL-SCNC: 22 MMOL/L (ref 20–32)
CREAT SERPL-MCNC: 0.89 MG/DL (ref 0.52–1.04)
CREAT SERPL-MCNC: 1.16 MG/DL (ref 0.52–1.04)
EOSINOPHIL # BLD AUTO: 0 10E3/UL (ref 0–0.7)
EOSINOPHIL # BLD AUTO: 0 10E3/UL (ref 0–0.7)
EOSINOPHIL NFR BLD AUTO: 0 %
EOSINOPHIL NFR BLD AUTO: 0 %
ERYTHROCYTE [DISTWIDTH] IN BLOOD BY AUTOMATED COUNT: 14.8 % (ref 10–15)
ERYTHROCYTE [DISTWIDTH] IN BLOOD BY AUTOMATED COUNT: 15.4 % (ref 10–15)
FIBRINOGEN PPP-MCNC: 289 MG/DL (ref 170–490)
FIBRINOGEN PPP-MCNC: 291 MG/DL (ref 170–490)
FIBRINOGEN PPP-MCNC: 297 MG/DL (ref 170–490)
FIBRINOGEN PPP-MCNC: 304 MG/DL (ref 170–490)
FIBRINOGEN PPP-MCNC: 316 MG/DL (ref 170–490)
FIBRINOGEN PPP-MCNC: 364 MG/DL (ref 170–490)
GFR SERPL CREATININE-BSD FRML MDRD: 52 ML/MIN/1.73M2
GFR SERPL CREATININE-BSD FRML MDRD: 72 ML/MIN/1.73M2
GLUCOSE BLD-MCNC: 109 MG/DL (ref 70–99)
GLUCOSE BLD-MCNC: 128 MG/DL (ref 70–99)
GLUCOSE BLDC GLUCOMTR-MCNC: 115 MG/DL (ref 70–99)
GLUCOSE BLDC GLUCOMTR-MCNC: 120 MG/DL (ref 70–99)
GLUCOSE BLDC GLUCOMTR-MCNC: 120 MG/DL (ref 70–99)
GLUCOSE BLDC GLUCOMTR-MCNC: 133 MG/DL (ref 70–99)
GLUCOSE BLDC GLUCOMTR-MCNC: 139 MG/DL (ref 70–99)
GLUCOSE BLDC GLUCOMTR-MCNC: 139 MG/DL (ref 70–99)
GLUCOSE BLDC GLUCOMTR-MCNC: 99 MG/DL (ref 70–99)
HBA1C MFR BLD: 5.4 % (ref 0–5.6)
HCO3 BLD-SCNC: 20 MMOL/L (ref 21–28)
HCO3 BLD-SCNC: 20 MMOL/L (ref 21–28)
HCO3 BLD-SCNC: 21 MMOL/L (ref 21–28)
HCO3 BLDV-SCNC: 20 MMOL/L (ref 21–28)
HCT VFR BLD AUTO: 23.7 % (ref 35–47)
HCT VFR BLD AUTO: 26.2 % (ref 35–47)
HGB BLD-MCNC: 7.9 G/DL (ref 11.7–15.7)
HGB BLD-MCNC: 8 G/DL (ref 11.7–15.7)
HGB BLD-MCNC: 8.4 G/DL (ref 11.7–15.7)
HGB BLD-MCNC: 9.4 G/DL (ref 11.7–15.7)
HGB BLD-MCNC: 9.4 G/DL (ref 11.7–15.7)
IMM GRANULOCYTES # BLD: 0.1 10E3/UL
IMM GRANULOCYTES # BLD: 0.1 10E3/UL
IMM GRANULOCYTES NFR BLD: 1 %
IMM GRANULOCYTES NFR BLD: 1 %
INR PPP: 1.63 (ref 0.85–1.15)
INR PPP: 1.63 (ref 0.85–1.15)
LACTATE SERPL-SCNC: 1 MMOL/L (ref 0.7–2)
LACTATE SERPL-SCNC: 1.1 MMOL/L (ref 0.7–2)
LACTATE SERPL-SCNC: 1.1 MMOL/L (ref 0.7–2)
LACTATE SERPL-SCNC: 1.2 MMOL/L (ref 0.7–2)
LACTATE SERPL-SCNC: 1.8 MMOL/L (ref 0.7–2)
LIPASE SERPL-CCNC: 83 U/L (ref 73–393)
LYMPHOCYTES # BLD AUTO: 0.5 10E3/UL (ref 0.8–5.3)
LYMPHOCYTES # BLD AUTO: 0.6 10E3/UL (ref 0.8–5.3)
LYMPHOCYTES NFR BLD AUTO: 3 %
LYMPHOCYTES NFR BLD AUTO: 4 %
MAGNESIUM SERPL-MCNC: 1.6 MG/DL (ref 1.6–2.3)
MAGNESIUM SERPL-MCNC: 1.8 MG/DL (ref 1.6–2.3)
MCH RBC QN AUTO: 30.6 PG (ref 26.5–33)
MCH RBC QN AUTO: 30.7 PG (ref 26.5–33)
MCHC RBC AUTO-ENTMCNC: 35.4 G/DL (ref 31.5–36.5)
MCHC RBC AUTO-ENTMCNC: 35.9 G/DL (ref 31.5–36.5)
MCV RBC AUTO: 85 FL (ref 78–100)
MCV RBC AUTO: 87 FL (ref 78–100)
MONOCYTES # BLD AUTO: 0.5 10E3/UL (ref 0–1.3)
MONOCYTES # BLD AUTO: 0.6 10E3/UL (ref 0–1.3)
MONOCYTES NFR BLD AUTO: 3 %
MONOCYTES NFR BLD AUTO: 4 %
NEUTROPHILS # BLD AUTO: 15.2 10E3/UL (ref 1.6–8.3)
NEUTROPHILS # BLD AUTO: 15.5 10E3/UL (ref 1.6–8.3)
NEUTROPHILS NFR BLD AUTO: 91 %
NEUTROPHILS NFR BLD AUTO: 93 %
NRBC # BLD AUTO: 0 10E3/UL
NRBC # BLD AUTO: 0 10E3/UL
NRBC BLD AUTO-RTO: 0 /100
NRBC BLD AUTO-RTO: 0 /100
O2/TOTAL GAS SETTING VFR VENT: 30 %
OXYHGB MFR BLDV: 81 % (ref 70–75)
PCO2 BLD: 30 MM HG (ref 35–45)
PCO2 BLD: 30 MM HG (ref 35–45)
PCO2 BLD: 32 MM HG (ref 35–45)
PCO2 BLDV: 33 MM HG (ref 40–50)
PH BLD: 7.42 [PH] (ref 7.35–7.45)
PH BLD: 7.43 [PH] (ref 7.35–7.45)
PH BLD: 7.43 [PH] (ref 7.35–7.45)
PH BLDV: 7.4 [PH] (ref 7.32–7.43)
PHOSPHATE SERPL-MCNC: 3.1 MG/DL (ref 2.5–4.5)
PHOSPHATE SERPL-MCNC: 3.9 MG/DL (ref 2.5–4.5)
PLATELET # BLD AUTO: 61 10E3/UL (ref 150–450)
PLATELET # BLD AUTO: 78 10E3/UL (ref 150–450)
PO2 BLD: 123 MM HG (ref 80–105)
PO2 BLD: 125 MM HG (ref 80–105)
PO2 BLD: 158 MM HG (ref 80–105)
PO2 BLDV: 44 MM HG (ref 25–47)
POTASSIUM BLD-SCNC: 3.4 MMOL/L (ref 3.4–5.3)
POTASSIUM BLD-SCNC: 3.5 MMOL/L (ref 3.4–5.3)
POTASSIUM BLD-SCNC: 3.6 MMOL/L (ref 3.4–5.3)
POTASSIUM BLD-SCNC: 3.7 MMOL/L (ref 3.4–5.3)
PROT SERPL-MCNC: 4.3 G/DL (ref 6.8–8.8)
PROT SERPL-MCNC: 4.4 G/DL (ref 6.8–8.8)
RBC # BLD AUTO: 2.74 10E6/UL (ref 3.8–5.2)
RBC # BLD AUTO: 3.07 10E6/UL (ref 3.8–5.2)
SODIUM SERPL-SCNC: 138 MMOL/L (ref 133–144)
SODIUM SERPL-SCNC: 140 MMOL/L (ref 133–144)
TACROLIMUS BLD-MCNC: 5.9 UG/L (ref 5–15)
TME LAST DOSE: NORMAL H
TME LAST DOSE: NORMAL H
WBC # BLD AUTO: 16.6 10E3/UL (ref 4–11)
WBC # BLD AUTO: 16.6 10E3/UL (ref 4–11)

## 2021-11-19 PROCEDURE — 82248 BILIRUBIN DIRECT: CPT | Performed by: SURGERY

## 2021-11-19 PROCEDURE — 250N000011 HC RX IP 250 OP 636: Performed by: NURSE ANESTHETIST, CERTIFIED REGISTERED

## 2021-11-19 PROCEDURE — 85610 PROTHROMBIN TIME: CPT | Performed by: SURGERY

## 2021-11-19 PROCEDURE — 99291 CRITICAL CARE FIRST HOUR: CPT | Mod: GC | Performed by: SURGERY

## 2021-11-19 PROCEDURE — 250N000011 HC RX IP 250 OP 636

## 2021-11-19 PROCEDURE — 80197 ASSAY OF TACROLIMUS: CPT | Performed by: SURGERY

## 2021-11-19 PROCEDURE — 82150 ASSAY OF AMYLASE: CPT | Performed by: SURGERY

## 2021-11-19 PROCEDURE — 200N000002 HC R&B ICU UMMC

## 2021-11-19 PROCEDURE — 999N000063 XR ABDOMEN PORT 1 VIEWS

## 2021-11-19 PROCEDURE — 83735 ASSAY OF MAGNESIUM: CPT | Performed by: SURGERY

## 2021-11-19 PROCEDURE — 250N000011 HC RX IP 250 OP 636: Performed by: SURGERY

## 2021-11-19 PROCEDURE — 85384 FIBRINOGEN ACTIVITY: CPT | Performed by: SURGERY

## 2021-11-19 PROCEDURE — 999N000065 XR ABDOMEN PORT 1 VIEWS

## 2021-11-19 PROCEDURE — 83605 ASSAY OF LACTIC ACID: CPT | Performed by: STUDENT IN AN ORGANIZED HEALTH CARE EDUCATION/TRAINING PROGRAM

## 2021-11-19 PROCEDURE — 360N000078 HC SURGERY LEVEL 5, PER MIN: Performed by: TRANSPLANT SURGERY

## 2021-11-19 PROCEDURE — P9016 RBC LEUKOCYTES REDUCED: HCPCS | Performed by: TRANSPLANT SURGERY

## 2021-11-19 PROCEDURE — 250N000012 HC RX MED GY IP 250 OP 636 PS 637: Performed by: SURGERY

## 2021-11-19 PROCEDURE — 94003 VENT MGMT INPAT SUBQ DAY: CPT

## 2021-11-19 PROCEDURE — 82140 ASSAY OF AMMONIA: CPT | Performed by: TRANSPLANT SURGERY

## 2021-11-19 PROCEDURE — 99207 PR SATISFY VISIT NUMBER: CPT | Performed by: TRANSPLANT SURGERY

## 2021-11-19 PROCEDURE — P9041 ALBUMIN (HUMAN),5%, 50ML: HCPCS | Performed by: STUDENT IN AN ORGANIZED HEALTH CARE EDUCATION/TRAINING PROGRAM

## 2021-11-19 PROCEDURE — 84295 ASSAY OF SERUM SODIUM: CPT | Performed by: SURGERY

## 2021-11-19 PROCEDURE — 85025 COMPLETE CBC W/AUTO DIFF WBC: CPT | Performed by: SURGERY

## 2021-11-19 PROCEDURE — 250N000009 HC RX 250

## 2021-11-19 PROCEDURE — 84100 ASSAY OF PHOSPHORUS: CPT | Performed by: SURGERY

## 2021-11-19 PROCEDURE — P9041 ALBUMIN (HUMAN),5%, 50ML: HCPCS | Performed by: DIETITIAN, REGISTERED

## 2021-11-19 PROCEDURE — 250N000013 HC RX MED GY IP 250 OP 250 PS 637: Performed by: SURGERY

## 2021-11-19 PROCEDURE — 82330 ASSAY OF CALCIUM: CPT | Performed by: SURGERY

## 2021-11-19 PROCEDURE — 85730 THROMBOPLASTIN TIME PARTIAL: CPT | Performed by: SURGERY

## 2021-11-19 PROCEDURE — 258N000003 HC RX IP 258 OP 636: Performed by: TRANSPLANT SURGERY

## 2021-11-19 PROCEDURE — 74018 RADEX ABDOMEN 1 VIEW: CPT | Mod: 26 | Performed by: RADIOLOGY

## 2021-11-19 PROCEDURE — 85018 HEMOGLOBIN: CPT | Performed by: STUDENT IN AN ORGANIZED HEALTH CARE EDUCATION/TRAINING PROGRAM

## 2021-11-19 PROCEDURE — 93975 VASCULAR STUDY: CPT | Mod: 26 | Performed by: RADIOLOGY

## 2021-11-19 PROCEDURE — 250N000011 HC RX IP 250 OP 636: Performed by: STUDENT IN AN ORGANIZED HEALTH CARE EDUCATION/TRAINING PROGRAM

## 2021-11-19 PROCEDURE — 250N000009 HC RX 250: Performed by: NURSE ANESTHETIST, CERTIFIED REGISTERED

## 2021-11-19 PROCEDURE — 250N000009 HC RX 250: Performed by: TRANSPLANT SURGERY

## 2021-11-19 PROCEDURE — 272N000001 HC OR GENERAL SUPPLY STERILE: Performed by: TRANSPLANT SURGERY

## 2021-11-19 PROCEDURE — 250N000011 HC RX IP 250 OP 636: Performed by: TRANSPLANT SURGERY

## 2021-11-19 PROCEDURE — 250N000013 HC RX MED GY IP 250 OP 250 PS 637: Performed by: PSYCHIATRY & NEUROLOGY

## 2021-11-19 PROCEDURE — 250N000011 HC RX IP 250 OP 636: Performed by: DIETITIAN, REGISTERED

## 2021-11-19 PROCEDURE — 258N000003 HC RX IP 258 OP 636: Performed by: SURGERY

## 2021-11-19 PROCEDURE — 93975 VASCULAR STUDY: CPT

## 2021-11-19 PROCEDURE — 999N000157 HC STATISTIC RCP TIME EA 10 MIN

## 2021-11-19 PROCEDURE — 250N000024 HC ISOFLURANE, PER MIN: Performed by: TRANSPLANT SURGERY

## 2021-11-19 PROCEDURE — 0WQF0ZZ REPAIR ABDOMINAL WALL, OPEN APPROACH: ICD-10-PCS | Performed by: TRANSPLANT SURGERY

## 2021-11-19 PROCEDURE — C9113 INJ PANTOPRAZOLE SODIUM, VIA: HCPCS

## 2021-11-19 PROCEDURE — 82330 ASSAY OF CALCIUM: CPT

## 2021-11-19 PROCEDURE — 250N000013 HC RX MED GY IP 250 OP 250 PS 637: Performed by: STUDENT IN AN ORGANIZED HEALTH CARE EDUCATION/TRAINING PROGRAM

## 2021-11-19 PROCEDURE — 0W3G0ZZ CONTROL BLEEDING IN PERITONEAL CAVITY, OPEN APPROACH: ICD-10-PCS | Performed by: TRANSPLANT SURGERY

## 2021-11-19 PROCEDURE — 258N000001 HC RX 258: Performed by: SURGERY

## 2021-11-19 PROCEDURE — 370N000017 HC ANESTHESIA TECHNICAL FEE, PER MIN: Performed by: TRANSPLANT SURGERY

## 2021-11-19 PROCEDURE — 250N000009 HC RX 250: Performed by: DIETITIAN, REGISTERED

## 2021-11-19 PROCEDURE — 83605 ASSAY OF LACTIC ACID: CPT | Performed by: SURGERY

## 2021-11-19 PROCEDURE — 82805 BLOOD GASES W/O2 SATURATION: CPT | Performed by: TRANSPLANT SURGERY

## 2021-11-19 PROCEDURE — 44500 INTRO GASTROINTESTINAL TUBE: CPT

## 2021-11-19 PROCEDURE — 83036 HEMOGLOBIN GLYCOSYLATED A1C: CPT | Performed by: STUDENT IN AN ORGANIZED HEALTH CARE EDUCATION/TRAINING PROGRAM

## 2021-11-19 PROCEDURE — 250N000013 HC RX MED GY IP 250 OP 250 PS 637: Performed by: TRANSPLANT SURGERY

## 2021-11-19 PROCEDURE — 82803 BLOOD GASES ANY COMBINATION: CPT | Performed by: SURGERY

## 2021-11-19 PROCEDURE — 83690 ASSAY OF LIPASE: CPT | Performed by: SURGERY

## 2021-11-19 PROCEDURE — 82810 BLOOD GASES O2 SAT ONLY: CPT

## 2021-11-19 PROCEDURE — 84132 ASSAY OF SERUM POTASSIUM: CPT | Performed by: SURGERY

## 2021-11-19 PROCEDURE — 85018 HEMOGLOBIN: CPT | Performed by: SURGERY

## 2021-11-19 RX ORDER — MAGNESIUM OXIDE 400 MG/1
400 TABLET ORAL 2 TIMES DAILY
Status: COMPLETED | OUTPATIENT
Start: 2021-11-19 | End: 2021-11-20

## 2021-11-19 RX ORDER — ALBUMIN, HUMAN INJ 5% 5 %
25 SOLUTION INTRAVENOUS ONCE
Status: COMPLETED | OUTPATIENT
Start: 2021-11-19 | End: 2021-11-19

## 2021-11-19 RX ORDER — DEXTROSE MONOHYDRATE 100 MG/ML
INJECTION, SOLUTION INTRAVENOUS CONTINUOUS PRN
Status: DISCONTINUED | OUTPATIENT
Start: 2021-11-19 | End: 2021-11-23

## 2021-11-19 RX ORDER — SODIUM CHLORIDE, SODIUM GLUCONATE, SODIUM ACETATE, POTASSIUM CHLORIDE AND MAGNESIUM CHLORIDE 526; 502; 368; 37; 30 MG/100ML; MG/100ML; MG/100ML; MG/100ML; MG/100ML
INJECTION, SOLUTION INTRAVENOUS CONTINUOUS PRN
Status: DISCONTINUED | OUTPATIENT
Start: 2021-11-19 | End: 2021-11-19

## 2021-11-19 RX ORDER — LIDOCAINE HYDROCHLORIDE 20 MG/ML
5 SOLUTION OROPHARYNGEAL ONCE
Status: COMPLETED | OUTPATIENT
Start: 2021-11-19 | End: 2021-11-19

## 2021-11-19 RX ORDER — GUAIFENESIN 600 MG/1
15 TABLET, EXTENDED RELEASE ORAL DAILY
Status: DISCONTINUED | OUTPATIENT
Start: 2021-11-19 | End: 2021-11-23

## 2021-11-19 RX ORDER — DEXTROSE MONOHYDRATE 25 G/50ML
25-50 INJECTION, SOLUTION INTRAVENOUS
Status: DISCONTINUED | OUTPATIENT
Start: 2021-11-19 | End: 2021-11-19

## 2021-11-19 RX ORDER — DEXTROSE MONOHYDRATE 100 MG/ML
INJECTION, SOLUTION INTRAVENOUS CONTINUOUS PRN
Status: DISCONTINUED | OUTPATIENT
Start: 2021-11-19 | End: 2021-11-19

## 2021-11-19 RX ORDER — FENTANYL CITRATE 50 UG/ML
INJECTION, SOLUTION INTRAMUSCULAR; INTRAVENOUS PRN
Status: DISCONTINUED | OUTPATIENT
Start: 2021-11-19 | End: 2021-11-19

## 2021-11-19 RX ORDER — ALBUMIN, HUMAN INJ 5% 5 %
SOLUTION INTRAVENOUS
Status: DISCONTINUED
Start: 2021-11-19 | End: 2021-11-19 | Stop reason: HOSPADM

## 2021-11-19 RX ORDER — FUROSEMIDE 10 MG/ML
20 INJECTION INTRAMUSCULAR; INTRAVENOUS ONCE
Status: COMPLETED | OUTPATIENT
Start: 2021-11-19 | End: 2021-11-19

## 2021-11-19 RX ORDER — POTASSIUM CHLORIDE 7.45 MG/ML
10 INJECTION INTRAVENOUS ONCE
Status: COMPLETED | OUTPATIENT
Start: 2021-11-19 | End: 2021-11-19

## 2021-11-19 RX ORDER — AMINO AC/PROTEIN HYDR/WHEY PRO 10G-100/30
1 LIQUID (ML) ORAL DAILY
Status: DISCONTINUED | OUTPATIENT
Start: 2021-11-19 | End: 2021-11-27 | Stop reason: HOSPADM

## 2021-11-19 RX ORDER — POTASSIUM CHLORIDE 20MEQ/15ML
10 LIQUID (ML) ORAL ONCE
Status: COMPLETED | OUTPATIENT
Start: 2021-11-19 | End: 2021-11-19

## 2021-11-19 RX ORDER — POTASSIUM CHLORIDE 1.5 G/1.58G
20 POWDER, FOR SOLUTION ORAL ONCE
Status: COMPLETED | OUTPATIENT
Start: 2021-11-20 | End: 2021-11-20

## 2021-11-19 RX ORDER — NICOTINE POLACRILEX 4 MG
15-30 LOZENGE BUCCAL
Status: DISCONTINUED | OUTPATIENT
Start: 2021-11-19 | End: 2021-11-19

## 2021-11-19 RX ADMIN — VALGANCICLOVIR HYDROCHLORIDE 450 MG: 50 POWDER, FOR SOLUTION ORAL at 08:29

## 2021-11-19 RX ADMIN — VANCOMYCIN HYDROCHLORIDE 1250 MG: 100 INJECTION, POWDER, LYOPHILIZED, FOR SOLUTION INTRAVENOUS at 11:04

## 2021-11-19 RX ADMIN — PIPERACILLIN AND TAZOBACTAM 3.38 G: 3; .375 INJECTION, POWDER, LYOPHILIZED, FOR SOLUTION INTRAVENOUS at 01:33

## 2021-11-19 RX ADMIN — POTASSIUM & SODIUM PHOSPHATES POWDER PACK 280-160-250 MG 1 PACKET: 280-160-250 PACK at 08:44

## 2021-11-19 RX ADMIN — HUMAN INSULIN 3 UNITS/HR: 100 INJECTION, SOLUTION SUBCUTANEOUS at 08:31

## 2021-11-19 RX ADMIN — VANCOMYCIN HYDROCHLORIDE 750 MG: 1 INJECTION, POWDER, LYOPHILIZED, FOR SOLUTION INTRAVENOUS at 22:55

## 2021-11-19 RX ADMIN — DOCUSATE SODIUM 50 MG AND SENNOSIDES 8.6 MG 1 TABLET: 8.6; 5 TABLET, FILM COATED ORAL at 20:04

## 2021-11-19 RX ADMIN — PANTOPRAZOLE SODIUM 40 MG: 40 INJECTION, POWDER, FOR SOLUTION INTRAVENOUS at 08:26

## 2021-11-19 RX ADMIN — Medication 400 MG: at 20:04

## 2021-11-19 RX ADMIN — Medication 15 ML: at 17:41

## 2021-11-19 RX ADMIN — SULFAMETHOXAZOLE AND TRIMETHOPRIM 1 TABLET: 400; 80 TABLET ORAL at 08:26

## 2021-11-19 RX ADMIN — TACROLIMUS 2 MG: 5 CAPSULE ORAL at 17:40

## 2021-11-19 RX ADMIN — Medication 25 MCG: at 03:30

## 2021-11-19 RX ADMIN — Medication 40 MG: at 17:41

## 2021-11-19 RX ADMIN — MIDAZOLAM 2 MG: 1 INJECTION INTRAMUSCULAR; INTRAVENOUS at 11:39

## 2021-11-19 RX ADMIN — PROPOFOL 35 MCG/KG/MIN: 10 INJECTION, EMULSION INTRAVENOUS at 06:13

## 2021-11-19 RX ADMIN — FUROSEMIDE 20 MG: 10 INJECTION, SOLUTION INTRAVENOUS at 04:34

## 2021-11-19 RX ADMIN — POTASSIUM CHLORIDE 10 MEQ: 20 SOLUTION ORAL at 03:53

## 2021-11-19 RX ADMIN — ALBUMIN (HUMAN) 25 G: 12.5 INJECTION, SOLUTION INTRAVENOUS at 01:33

## 2021-11-19 RX ADMIN — Medication 25 MCG: at 01:44

## 2021-11-19 RX ADMIN — LIDOCAINE HYDROCHLORIDE 5 ML: 20 SOLUTION ORAL; TOPICAL at 15:30

## 2021-11-19 RX ADMIN — PIPERACILLIN AND TAZOBACTAM 3.38 G: 3; .375 INJECTION, POWDER, LYOPHILIZED, FOR SOLUTION INTRAVENOUS at 15:15

## 2021-11-19 RX ADMIN — Medication 400 MG: at 08:26

## 2021-11-19 RX ADMIN — PIPERACILLIN AND TAZOBACTAM 3.38 G: 3; .375 INJECTION, POWDER, LYOPHILIZED, FOR SOLUTION INTRAVENOUS at 20:04

## 2021-11-19 RX ADMIN — SODIUM CHLORIDE 200 MG: 9 INJECTION, SOLUTION INTRAVENOUS at 08:34

## 2021-11-19 RX ADMIN — ROCURONIUM BROMIDE 50 MG: 50 INJECTION, SOLUTION INTRAVENOUS at 11:39

## 2021-11-19 RX ADMIN — PIPERACILLIN AND TAZOBACTAM 3.38 G: 3; .375 INJECTION, POWDER, LYOPHILIZED, FOR SOLUTION INTRAVENOUS at 08:29

## 2021-11-19 RX ADMIN — PROPOFOL 30 MCG/KG/MIN: 10 INJECTION, EMULSION INTRAVENOUS at 15:58

## 2021-11-19 RX ADMIN — MYCOPHENOLATE MOFETIL 750 MG: 200 POWDER, FOR SUSPENSION ORAL at 08:37

## 2021-11-19 RX ADMIN — Medication 1 PACKET: at 17:43

## 2021-11-19 RX ADMIN — POTASSIUM CHLORIDE 10 MEQ: 7.46 INJECTION, SOLUTION INTRAVENOUS at 08:33

## 2021-11-19 RX ADMIN — TACROLIMUS 2 MG: 5 CAPSULE ORAL at 08:29

## 2021-11-19 RX ADMIN — SODIUM CHLORIDE, SODIUM GLUCONATE, SODIUM ACETATE, POTASSIUM CHLORIDE AND MAGNESIUM CHLORIDE: 526; 502; 368; 37; 30 INJECTION, SOLUTION INTRAVENOUS at 11:39

## 2021-11-19 RX ADMIN — SUGAMMADEX 200 MG: 100 INJECTION, SOLUTION INTRAVENOUS at 13:37

## 2021-11-19 RX ADMIN — DEXTROSE AND SODIUM CHLORIDE: 5; 450 INJECTION, SOLUTION INTRAVENOUS at 22:56

## 2021-11-19 RX ADMIN — ALBUMIN (HUMAN) 25 G: 12.5 INJECTION, SOLUTION INTRAVENOUS at 10:51

## 2021-11-19 RX ADMIN — ROCURONIUM BROMIDE 20 MG: 50 INJECTION, SOLUTION INTRAVENOUS at 12:10

## 2021-11-19 RX ADMIN — FLUCONAZOLE 400 MG: 400 INJECTION, SOLUTION INTRAVENOUS at 08:32

## 2021-11-19 RX ADMIN — MYCOPHENOLATE MOFETIL 750 MG: 200 POWDER, FOR SUSPENSION ORAL at 17:40

## 2021-11-19 RX ADMIN — FENTANYL CITRATE 100 MCG: 50 INJECTION, SOLUTION INTRAMUSCULAR; INTRAVENOUS at 12:41

## 2021-11-19 RX ADMIN — Medication 0.08 MCG/KG/MIN: at 09:02

## 2021-11-19 ASSESSMENT — ACTIVITIES OF DAILY LIVING (ADL)
ADLS_ACUITY_SCORE: 17

## 2021-11-19 ASSESSMENT — MIFFLIN-ST. JEOR: SCORE: 1135.88

## 2021-11-19 NOTE — PROGRESS NOTES
ICU PROGRESS NOTE  11/19/2021    Critical Care Services Progress Note:     Berta Nava remains critically ill with a complex hospital course after liver transplantation.  She is postoperative day 1 from her transplant.  Abdominal abscess noted.  She had to return to the operating room today.  I personally examined and evaluated the patient today.   The patient s prognosis today is improved as she stabilizes from her liver transplant.    I have evaluated all laboratory values and imaging studies from the past 24 hours.  Key findings and decisions made today included we will add vancomycin to antibiotic coverage and place a feeding tube as possible.  Wean vasoactive drugs and provide additional albumin as needed.    I personally managed the mechanical ventilation, analgesia and sedation, vasoactive drugs and fluids.  Consults ongoing and ordered are Transplant Services, Pharmacy and Nutrition.    Procedures that will happen today are return to the operating room, continued mechanical ventilation, feeding tube placement as possible.    All treatments were placed at my direction.  I formulated today s plan with Dr. Cruz and the house staff team or resident(s) and agree with the findings and plan in the associated note.       The above plans and care have been discussed with available family members and all questions and concerns were addressed.     I spent a total of 35 minutes (excluding procedure time) personally providing and directing critical care services at the bedside and on the critical care unit for Berta Nava.        Henry Cuba MD        CO-MORBIDITIES:   Liver transplant planned  (primary encounter diagnosis)  Encephalopathy  Hepatic encephalopathy (H)  Chronic liver failure without hepatic coma (H)    ASSESSMENT: Berta Nava is a 56 year old female who presented on 11/17/2021 with PMH of primary sclerosing cholangitis s/p ERCP, cirrhosis c/b ascites who presents with HE/AMS for  potential liver transplant. The patient was scheduled to get liver txp one month ago, however, upon induction of anesthesia, the patient went into unstable Vtach and the case was aborted.  In the OR, EBL was 10 L; she received 22 U of blood, 3 of platelets, 13 FFP, 2 cryo, crystalloid 8cc  and 2400cc of cellsaver. Her abdomen was temporally closed with VAC and came to the floor with propofol gtt and intubated. She will RTOR on 11/20/21.    MELD-Na score: 25 at 11/17/2021  2:01 PM  MELD score: 16 at 11/17/2021  2:01 PM  Calculated from:  Serum Creatinine: 0.92 mg/dL (Using min of 1 mg/dL) at 11/17/2021  2:01 PM  Serum Sodium: 126 mmol/L at 11/17/2021  2:01 PM  Total Bilirubin: 6.6 mg/dL at 11/17/2021  2:01 PM  INR(ratio): 1.24 at 11/17/2021  2:01 PM  Age: 56 years    PLAN:  - RTOR for closure  - NJT placement  - add vanc for GPC from intra-abdominal abscess  - liver US postop      Neuro/ pain/ sedation:  # Postoperative pain management  - Monitor neurological status. Notify the MD for any acute changes in exam.  - Acute pain: Fentanyl 25-50 gtt and PRN; wean as tolerated   - Sedation: Propofol 5-75 mcg/kg/hr; wean as tolerated   # Hepatic encephalopathy / AMS  - Hold PTA lactulose    Pulmonary care:   # postoperative respiratory support  - Supplemental oxygen to keep saturation above 92 %.  Ventilation Mode: CMV/AC  (Continuous Mandatory Ventilation/ Assist Control)  FiO2 (%): 40 %  Rate Set (breaths/minute): 14 breaths/min  Tidal Volume Set (mL): 450 mL  PEEP (cm H2O): 5 cmH2O  Oxygen Concentration (%): 40 %  Resp: 14  Resp: 14  - Post op ABG 7.42/37/130/24, no ABG necessary  - Wean FiO2 as tolerated; consider extubation after closure today    Cardiovascular:    # Post op hemodynamic management  # Hx of Vtach  - Monitor hemodynamic status.   - CVP goal 8< CVP<12  - Maintain MAP > 65  - Pressor: off vasso, on 0.08 of NE  - Monitor hemodynamic status    GI care:   # Primary sclerosing cholangitis c/b strictures s/p  ERCP  # S/P DDLT   - NPO except medications.  - OG in place  - pantoprazole 40 mg every day   - nutrition consult for NJT placement  - Hold PTA omeprazole  - Hold ursodiol   - Hold sucralfate    Renal/Fluids/ Electrolytes/ Nutrition:   # hx of GONZALO  - D5 1/2 NS at 100ml/hr  for IV fluid hydration  - 500ml albumin for decreased UOP  - furosemide gtt at 10 ml/hr  - Hold PTA lasix and Spironolactone  - ICU electrolyte replacement protocol  - Urine output 1190//200  - Will continue to monitor intake and output.     Endocrine:    - insulin gtt for BS goal <180    ID/ Antibiotics:  # suprarenal abscess  # ppx abx  - intraoperative cx from abscess grew GPC, start vanc  - Zosyn 48hr  - Trimethoprim-sulfamethoxazole for PCP prophylaxis  - Ganciclovir for CMV ppx   - micafungin or fluconazole, will clarify with txp    # Anemia due to blood loss  # Coagulopathy  - Hemoglobin stable at 8.4   - Preop Hgb 10.8  - INR 1.63, Platelets 60, Fibrinogen 297; no need for blood products at this time  - Monitor hgb; transfuse if Hgb < 7  - Monitor fibrinogen; fibrinogen goal >200  - Monitor platelet; replace if platelet < 50,000  - INR goal <2  - q12h CBC, q4h lactate, q4h BMP      Prophylaxis:    -Mechanical prophylaxis for DVT.   -No chemical DVT prophylaxis due to high risk of bleeding.   -GI ppx: Protonic BID      MSK:    - PT and OT consulted. Appreciate recs.      Lines/ tubes/ drains:  - ETT, OGT, [place], OG, 2 MAC, PAC       Disposition:  -Surgical ICU. General Cares:   - Code status: full     Patient seen, findings and plan discussed with ICU staff, Dr. Bereket Cruz MD  Anesthesiology, CA-1    ====================================    TODAY'S PROGRESS:   SUBJECTIVE:   Out of OR at 1430 on 11/18/21. NAEO. Remain intubated for closure 11/19/21    OBJECTIVE:   1. VITAL SIGNS:   Temp:  [97.6  F (36.4  C)-99.8  F (37.7  C)] 98.8  F (37.1  C)  Pulse:  [68-89] 70  Resp:  [14-16] 14  MAP:  [65 mmHg-93 mmHg] 82 mmHg  Arterial Line BP:  ()/(36-70) 124/62  FiO2 (%):  [40 %] 40 %  SpO2:  [93 %-100 %] 99 %  Ventilation Mode: CMV/AC  (Continuous Mandatory Ventilation/ Assist Control)  FiO2 (%): 40 %  Rate Set (breaths/minute): 14 breaths/min  Tidal Volume Set (mL): 450 mL  PEEP (cm H2O): 5 cmH2O  Oxygen Concentration (%): 40 %  Resp: 14      2. INTAKE/ OUTPUT:   I/O last 3 completed shifts:  In: 41154.45 [I.V.:53514.45; Other:2400; NG/GT:150]  Out: 3810 [Urine:1385; Emesis/NG output:400; Drains:2025]    3. PHYSICAL EXAMINATION:     General: sedated  Neuro: GCS3i  Resp: CTABL  CV: RRR, S1S2 WNL  Abdomen: distended, wound VAC in place.   Extremities: warm and well perfused. 2+ edema  Skin: no rashes or jaundice    4. INVESTIGATIONS:  All labs and imaging studies personally reviewed     Arterial Blood Gases   Recent Labs   Lab 11/18/21  1437 11/18/21  1350 11/18/21  1258 11/18/21  1239   PH 7.49* 7.42 7.34* 7.31*   PCO2 30* 37 40 41   PO2 405* 130* 135* 138*   HCO3 22 24 22 20*     Complete Blood Count   Recent Labs   Lab 11/19/21  1005 11/19/21  0533 11/19/21  0127 11/18/21  2334 11/18/21  2156 11/18/21  1446   WBC  --  16.6* 16.6*  --  20.7* 10.3   HGB 7.9* 8.4* 9.4* 9.4* 10.3* 10.8*   PLT  --  61* 78*  --  95* 130*     Basic Metabolic Panel  Recent Labs   Lab 11/19/21  1031 11/19/21  0829 11/19/21  0541 11/19/21  0533 11/18/21  2336 11/18/21  2334 11/18/21  1559 11/18/21  1446 11/18/21  1435 11/18/21  1350 11/18/21  1258 11/18/21  0831 11/17/21  1401   NA  --   --   --  140  --   --   --  143  --  138 138   < > 126*   POTASSIUM  --   --   --  3.6  --  3.7  --  3.2*  --  3.3* 3.7   < > 4.5   CHLORIDE  --   --   --  111*  --   --   --  110*  --   --   --   --  95   CO2  --   --   --  22  --   --   --  26  --   --   --   --  19*   BUN  --   --   --  21  --   --   --  14  --   --   --   --  27   CR  --   --   --  0.89  --   --   --  0.55  --   --   --   --  0.92   * 139* 120* 128*   < >  --    < > 128*   < > 154* 196*   < > 103*    < > =  values in this interval not displayed.     Liver Function Tests  Recent Labs   Lab 11/19/21  0533 11/19/21  0126 11/18/21  2156 11/18/21  1446 11/18/21  1350 11/17/21  1401   AST 98*  --   --  280*  280*  --  59*   *  --   --  200*  200*  --  37   ALKPHOS 36*  --   --  41  41  --  303*   BILITOTAL 1.3  --   --  3.1*  3.1*  --  6.6*   ALBUMIN 2.5*  --   --  1.6*  1.6*  --  2.5*   INR 1.63* 1.63* 1.76* 1.97*   < > 1.24*    < > = values in this interval not displayed.     Pancreatic Enzymes  Recent Labs   Lab 11/19/21  0533 11/18/21  1446 11/17/21  1401   LIPASE 83 156  --    AMYLASE 36 31 81     Coagulation Profile  Recent Labs   Lab 11/19/21  0533 11/19/21  0126 11/18/21  2156 11/18/21  1446   INR 1.63* 1.63* 1.76* 1.97*   PTT 33 34 33 44*         5. RADIOLOGY:   Recent Results (from the past 24 hour(s))   XR Chest Port 1 View    Narrative    EXAM: XR CHEST PORT 1 VIEW  11/18/2021 3:13 PM     HISTORY:  s/p liver txp       COMPARISON:  Chest x-ray 11/17/2021    FINDINGS:   Frontal supine radiograph of the chest. Endotracheal tube tip projects  over the midthoracic trachea. Right internal jugular [place]  catheter tip projects over the distal main pulmonary artery. The  trachea is midline. Cardiac silhouette is within normal limits. No  airspace opacities. No pleural effusion or pneumothorax. Osseous  structures are within normal limits. Surgical clips in the right upper  quadrant secondary to liver transplant.      Impression    IMPRESSION:   1. Endotracheal tube tip projects over the midthoracic trachea.  2. Right internal jugular [place] catheter tip is in the distal main  pulmonary artery.  3. No acute airspace disease.    I have personally reviewed the examination and initial interpretation  and I agree with the findings.    OLU MARTI MD         SYSTEM ID:  [id number]   US Liver Transplant    Narrative    EXAMINATION: Liver transplant ultrasound, 11/18/2021 4:13 PM    COMPARISON: Chest  radiograph 11/18/2021    HISTORY: Liver transplant postop day 0.    TECHNIQUE: The abdomen was scanned in standard fashion with  specialized ultrasound transducer(s) using both grey scale and limited  color Doppler techniques.    Findings:    Liver: Examination of the liver is somewhat limited due to limited  sonographic windows. Hepatic parenchyma is of normal echogenicity  without evidence for focal mass. Main portal vein is patent with  antegrade flow. The visualized intrahepatic biliary system is of  normal caliber. The common bile duct is not visualized.    Pancreas: The pancreas is not visualized.    Kidneys: The left kidney measures 11.1 cm. No evidence of  hydronephrosis. The right kidney is not visualized.    Spleen: The spleen is unremarkable and measures 11.6 cm in sagittal  dimension.    Fluid: No free fluid.    DOPPLER:  Doppler evaluation shows flow towards the liver in the  portal venous system.     Flow is   59 cm/sec in the extrahepatic native portal vein  67 cm/sec at the portal vein anastomosis  62 cm/sec in the intrahepatic transplant portal vein.     Flow is:  The leftward branch of the portal vein is not visualized.  57 cm/sec in the rightward branch of the portal vein  both flow towards the liver.     Flow in the hepatic artery is towards the liver and   134 cm/sec peak systolic  28 cm/sec minimum diastolic  0.79 resistive index.   Left hepatic artery: Not visualized.  Right hepatic artery: PSV of 46 cm/s, RI of 0.73    The splenic vein is patent and flow is towards the liver. The right  and middle hepatic veins are patent with flow towards the IVC. The  left hepatic vein is not visualized. The IVC is patent with flow  towards the heart.  Aorta is not visualized.      Impression    Impression:   1. Normal grayscale evaluation of the liver transplant. No evidence  for perihepatic fluid collections.  2. Patent Doppler assessment of liver transplant. The left hepatic  artery and left portal vein  are not visualized due to limited  sonographic windows.     I have personally reviewed the examination and initial interpretation  and I agree with the findings.    MARTINEZ JAVED MD         SYSTEM ID:  [id number]   XR Abdomen Port 1 View    Narrative    Exam: XR ABDOMEN PORT 1 VIEWS, 11/18/2021 5:02 PM    Indication: Check NG/OG tube placement    Comparison: 6/10/2021 CT    Findings:   Enteric tube distal tip projects over the distal stomach. Extensive  postsurgical changes over the right abdomen. Partially visualized  surgical sponges. There is also biliary drain in place. Nonobstructive  bowel gas pattern.      Impression    Impression:   1. Enteric tube distal tip projects over the distal stomach.  2. Extensive surgical changes over the right upper quadrant with  multiple retained surgical sponges, presumably expected.    I have personally reviewed the examination and initial interpretation  and I agree with the findings.    OLU MARTI MD         SYSTEM ID:  [id number]       =========================================

## 2021-11-19 NOTE — ANESTHESIA POSTPROCEDURE EVALUATION
Patient: Berta Nava    Procedure: Procedure(s):  CLOSURE, WOUND, ABDOMEN, SECONDARY       Diagnosis:Open wound of abdomen, subsequent encounter [S31.882D]  Diagnosis Additional Information: No value filed.    Anesthesia Type:  General    Note:  Disposition: ICU            ICU Sign Out: Anesthesiologist/ICU physician sign out WAS performed   Postop Pain Control:    PONV:    Neuro/Psych:             Sign Out: PLANNED postop sedation   Airway/Respiratory: Uneventful            Sign Out: AIRWAY IN SITU/Resp. Support               Airway in situ/Resp. Support: ETT   CV/Hemodynamics: Uneventful            Sign Out: Acceptable CV status; No obvious hypovolemia; No obvious fluid overload   Other NRE: NONE   DID A NON-ROUTINE EVENT OCCUR? No           Last vitals:  Vitals Value Taken Time   BP     Temp     Pulse     Resp     SpO2         Electronically Signed By: Scot Randolph MD  November 19, 2021  1:16 PM

## 2021-11-19 NOTE — ANESTHESIA CARE TRANSFER NOTE
Patient: Berta Nava    Procedure: Procedure(s):  CLOSURE, WOUND, ABDOMEN, SECONDARY, ABDOMINAL WASHOUT       Diagnosis: Open wound of abdomen, subsequent encounter [S31.109D]  Diagnosis Additional Information: No value filed.    Anesthesia Type:   General     Note:    Oropharynx: endotracheal tube in place and ventilatory support  Level of Consciousness: iatrogenic sedation      Independent Airway: airway patency not satisfactory and stable  Dentition: dentition unchanged  Vital Signs Stable: post-procedure vital signs reviewed and stable  Report to RN Given: handoff report given  Patient transferred to: ICU    ICU Handoff: Call for PAUSE to initiate/utilize ICU HANDOFF, Identified Patient, Identified Responsible Provider, Reviewed the Pertinent Medical History, Discussed Surgical Course, Reviewed Intra-OP Anesthesia Management and Issues during Anesthesia, Set Expectations for Post Procedure Period and Allowed Opportunity for Questions and Acknowledgement of Understanding      Vitals:  Vitals Value Taken Time   BP     Temp     Pulse     Resp     SpO2         Electronically Signed By: CHELSEY Hodgson CRNA  November 19, 2021  2:04 PM

## 2021-11-19 NOTE — PROGRESS NOTES
CLINICAL NUTRITION SERVICES - BRIEF NOTE     Nutrition Prescription    RECOMMENDATIONS FOR MDs/PROVIDERS TO ORDER:  None at present    Malnutrition Status:    Severe malnutrition in the context of acute on chronic illness    Recommendations already ordered by Registered Dietitian (RD):  TwoCal HN @ 40 mL/hr (960 mL/day) + 1 pkt Prosource daily to provide 1960 kcals (35 kcal/kg/day), 92 g PRO (1.6 g/kg/day), 672 mL H2O, 210 g CHO and 5 g Fiber daily.  - Initiate @ 10 ml/hr and advance by 10 ml q8hr as tolerated  - Do not start or advance until lytes (Mg++,K+) WNL and phos>2.0  - Recommend 30-60 ml q4hr fluid flushes for tube patency. Additional fluids and/or adjustments per MD.    - Order multivitamin/mineral (15 ml/day via FT) to help ensure micronutrient needs being met with suspected hypermetabolic demands and potential interruptions to TF infusions.  - HOB at 30 degrees for gastric feeds    Future/Additional Recommendations:  Monitor TF initiation / advancement / tolerance  Monitor lytes as pt at risk for refeeding syndrome with poor po pre op  Follow for ability to initiate PO diet post extubation   See full RD assessment in chart 11/18    EVALUATION OF THE PROGRESS TOWARD GOALS   Diet: NPO      NEW FINDINGS   11/18: liver tranplant, RTOR today for closure and washout  Intubated, + OGT  ppFT placement today - presumed gastric  K+ 3.6 (WNL), phos 3.9 (WNL), mg++ 1.9 (WNL) - replacements ordered    INTERVENTIONS  Enteral Nutrition - initiate once FT placement position verified.  Discussed with team and RN: OK to initiate and advance feeds if FT position gastric, keeping HOB at 30 degrees.    Monitoring/Evaluation  Progress toward goals will be monitored and evaluated per protocol.     Jennifer No, RD, LD  SICU [id number]

## 2021-11-19 NOTE — PHARMACY-VANCOMYCIN DOSING SERVICE
"Pharmacy Vancomycin Initial Note  Date of Service 2021  Patient's  1965  56 year old, female    Indication: Abscess    Current estimated CrCl = Estimated Creatinine Clearance: 60.4 mL/min (based on SCr of 0.89 mg/dL).    Creatinine for last 3 days  2021:  2:01 PM Creatinine 0.92 mg/dL  2021:  2:46 PM Creatinine 0.55 mg/dL  2021:  5:33 AM Creatinine 0.89 mg/dL    Recent Vancomycin Level(s) for last 3 days  No results found for requested labs within last 72 hours.      Vancomycin IV Administrations (past 72 hours)                   vancomycin vial 1000 mg (mg) 1,000 mg Given 21 0835                Nephrotoxins and other renal medications (From now, onward)    Start     Dose/Rate Route Frequency Ordered Stop    21 1030  vancomycin 1250 mg in 0.9% NaCl 250 mL intermittent infusion 1,250 mg         1,250 mg  over 90 Minutes Intravenous ONCE 21 0942      21  piperacillin-tazobactam (ZOSYN) 3.375 g vial to attach to  mL bag        Note to Pharmacy: For [facility], [facility] and [facility]: For Zosyn-naive patients, use the \"Zosyn initial dose + extended infusion\" order panel.    3.375 g  over 30 Minutes Intravenous EVERY 6 HOURS 21 1435 21 1959    21 1800  tacrolimus (GENERIC EQUIVALENT) capsule 2 mg        \"Or\" Linked Group Details    2 mg Oral 2 TIMES DAILY. 21 14321 1800  tacrolimus (GENERIC EQUIVALENT) suspension 2 mg        \"Or\" Linked Group Details    2 mg Oral or NG Tube 2 TIMES DAILY. 21 1435      21 1600  vasopressin 1 unit/mL MAX Conc (PITRESSIN) infusion         2.4 Units/hr  2.4 mL/hr  Intravenous CONTINUOUS 21 1544      21 1530  norepinephrine (LEVOPHED) 16 mg in  mL infusion MAX CONC CENTRAL LINE         0.01-0.6 mcg/kg/min × 55.8 kg  0.5-31.4 mL/hr  Intravenous CONTINUOUS 21 1505            Contrast Orders - past 72 hours (72h ago, onward)            None          [employer] " Prediction of Planned Initial Vancomycin Regimen  Loading dose: 1250 mg IV once.  Regimen: 750 mg IV every 12 hours.  Start time: 10:30 on 11/19/2021  Exposure target: AUC24 (range)400-600 mg/L.hr   AUC24,ss: 560 mg/L.hr  Probability of AUC24 > 400: 83 %  Ctrough,ss: 18.3 mg/L  Probability of Ctrough,ss > 20: 42 %  Probability of nephrotoxicity (Lodise DENA 2009): 15 %        Plan:  1. Give vancomycin 1250 mg IV once. Then start vancomycin  750 mg IV q12h.   2. Vancomycin monitoring method: AUC  3. Vancomycin therapeutic monitoring goal: 400-600 mg*h/L  4. Pharmacy will check vancomycin levels as appropriate in 1-3 Days.    5. Serum creatinine levels will be ordered daily for the first week of therapy and at least twice weekly for subsequent weeks.      Paty Park, PharmD - PGY1 Pharmacy Practice Resident

## 2021-11-19 NOTE — PROGRESS NOTES
Immunosuppression Note:    Berta Nava is a 56 year old female who is seen today  for immunosuppression management     I, Ernesto Schmitt MD, I have examined the patient with the resident/PA/Fellow, discussed and agree with the note and findings.  I have reviewed today's vital signs, medications, labs and imaging. I reviewed the immunosuppression medications and levels. I spoke to the patient/family and explained below clinical details and answered all the questions    Transplant Surgery  Inpatient Daily Progress Note  2021    Assessment & Plan: Berta Nava is a 56 year old female with PMH significant for PSC/UC, Sjogren's syndrome, HTN, liver transplant aborted in 2021 d/t V tach. S/p  donor liver transplant with biliary stent on 21, complicated by coagulopathy and finding of intra-abdominal abscess. Return to OR 21 for washout and abdominal closure.    Graft function: DDLTx 21 POD #1. Transaminases improving, TB normalized, 1.2  Lactate normal,1.   - US: left hepatic artery and left portal vein not visualized due to limited sonographic windows. Repeat US post closure today.  Immunosuppression management: Induction w/ steroids.  -Tacro 2mg BID, titrate to a level goal 8-10.  -MMF 750mg BID  -Steroid taper per protocol.  Complexity of management: High. Contributing factors:  induction.  Hematology:   ACD/ABL: Hgb 9, no transfusion since OR.  Thrombocytopenia, acute:   Plt 61, down from >300 on admission. Monitor.  Coagulopathy: INR 1.6, fib 304. Monitor.  Cardiorespiratory:   Circulatory failure/ Hypotension: Remains on NE 0.12, Wean as able to maintain MAP >65  Post-op mechanical ventilation: Extubation delayed by return to OR. Weaning trial when stable and awake.   GI/Nutrition:   Diet: NPO for OR today. Plan to place a NJ FT.  Endocrine:   Steroid induced hyperglycemia: Hgb A1c 5.4, Continue insulin gtt.  Fluid/Electrolytes: Hypovolemia/Large vol drain output :  -2Kg from admit, MIVF: D51/2 NS @ 100, received 1L albumin, repeat   GONZALO: Cr 1.2, up from ~0.9. Monitor. UOP 20cc/hr-trialed with Lasix +albumin  : Jurado to remain due to strict I&O  Infectious disease: Afebrile,  Leukocytosis WBC16 (20)  Intra abdominal abscess: Noted in OR on 11/18. Gram stain + GPC. Culture pending. On zosyn, fluconazole. Start Vanco.  Prophylaxis: DVT(mechanical), fall, GI (PPI), Fungal ppx (Diflucan) CMV ppx (gancyclovir), PCP ppx (bactrim)  Disposition: SICU    Medical Decision Making: High  Subsequent visit 23322 (high level decision making)    [place]/Fellow/Resident Provider: Nini Zhou NP     Faculty: Ernesto Schmitt M.D.    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    11/18/2021 (Liver), Postoperative day: 1     Interval History: Unable to obtain a history from the patient due to intubation  Overnight events: Remains on NE 0.12, improving with weaning propofol. remains intubated. Afebrile. Plan to return to OR today for washout. CVP 5-6    ROS:   A 10-point review of systems was negative except as noted above.    Curent Meds:   fluconazole  400 mg Intravenous Q24H    magnesium oxide  400 mg Oral BID    [START ON 11/20/2021] methylPREDNISolone  100 mg Intravenous Once    Followed by    [START ON 11/21/2021] methylPREDNISolone  50 mg Intravenous Once    Followed by    [START ON 11/22/2021] predniSONE  25 mg Oral Once    Followed by    [START ON 11/23/2021] predniSONE  10 mg Oral Once    mycophenolate  750 mg Oral BID IS    Or    mycophenolate  750 mg Oral or NG Tube BID IS    pantoprazole  40 mg Per Feeding Tube QAM AC    Or    pantoprazole (PROTONIX) IV  40 mg Intravenous QAM AC    piperacillin-tazobactam  3.375 g Intravenous Q6H     "polyethylene glycol  17 g Oral Daily    potassium chloride  10 mEq Intravenous Once    senna-docusate  1 tablet Oral BID    Or    senna-docusate  2 tablet Oral BID    sodium chloride (PF)  3 mL Intravenous Q8H    sulfamethoxazole-trimethoprim  1 tablet Oral Daily    tacrolimus  2 mg Oral BID IS    Or    tacrolimus  2 mg Oral or NG Tube BID IS    valGANciclovir  450 mg Oral Daily    Or    valGANciclovir  450 mg Oral or NG Tube Daily       Physical Exam:     Admit Weight: 57.6 kg (127 lb)    Current Vitals:   /56 (BP Location: Left arm, Patient Position: Supine)   Pulse 72   Temp 98.8  F (37.1  C) (Pulmonary Artery)   Resp 14   Ht 1.651 m (5' 5\")   Wt 55.5 kg (122 lb 5.7 oz)   LMP  (LMP Unknown)   SpO2 100%   BMI 20.36 kg/m      CVP (mmHg): 5 mmHg    Vital sign ranges:    Temp:  [97.6  F (36.4  C)-99.8  F (37.7  C)] 98.8  F (37.1  C)  Pulse:  [68-89] 72  Resp:  [14-16] 14  MAP:  [65 mmHg-93 mmHg] 85 mmHg  Arterial Line BP: ()/(36-70) 126/63  FiO2 (%):  [40 %] 40 %  SpO2:  [93 %-100 %] 100 %  Patient Vitals for the past 24 hrs:   Temp Temp src Pulse Resp SpO2 Weight   11/19/21 0800 98.8  F (37.1  C) Pulm Art -- 14 -- --   11/19/21 0700 -- -- 72 -- 100 % --   11/19/21 0645 -- -- 72 -- 100 % --   11/19/21 0630 -- -- 72 -- 100 % --   11/19/21 0615 -- -- 73 -- 100 % --   11/19/21 0600 -- -- 73 -- 100 % --   11/19/21 0545 -- -- 74 -- 100 % --   11/19/21 0530 -- -- 71 -- 100 % --   11/19/21 0515 -- -- 71 -- 100 % --   11/19/21 0500 -- -- 71 -- 100 % --   11/19/21 0445 -- -- 72 -- 100 % --   11/19/21 0430 -- -- 71 -- 100 % --   11/19/21 0415 -- -- 72 -- 99 % --   11/19/21 0400 98  F (36.7  C) Axillary 71 14 100 % 55.5 kg (122 lb 5.7 oz)   11/19/21 0345 -- -- 71 -- 99 % --   11/19/21 0330 -- -- 69 -- 100 % --   11/19/21 0315 -- -- 68 -- 99 % --   11/19/21 0300 -- -- 68 -- 99 % --   11/19/21 0245 -- -- 68 -- 99 % --   11/19/21 0230 -- -- 69 -- 99 % --   11/19/21 0215 -- -- 70 -- 99 % --   11/19/21 0200 -- -- " 72 -- 99 % --   11/19/21 0145 -- -- 74 -- 99 % --   11/19/21 0130 -- -- 74 -- 99 % --   11/19/21 0115 -- -- 74 -- 99 % --   11/19/21 0100 -- -- 75 -- 99 % --   11/19/21 0045 -- -- 75 -- 99 % --   11/19/21 0030 -- -- 75 -- 99 % --   11/19/21 0015 -- -- 75 -- 99 % --   11/19/21 0000 99.8  F (37.7  C) Axillary 75 14 99 % --   11/18/21 2345 -- -- 78 -- 99 % --   11/18/21 2330 -- -- 76 -- 100 % --   11/18/21 2315 -- -- 77 -- 100 % --   11/18/21 2300 -- -- 78 -- 100 % --   11/18/21 2245 -- -- 80 -- 99 % --   11/18/21 2230 -- -- 80 -- 100 % --   11/18/21 2215 -- -- 80 -- 100 % --   11/18/21 2200 -- -- 82 -- 99 % --   11/18/21 2145 -- -- 82 -- 99 % --   11/18/21 2130 -- -- 84 -- 99 % --   11/18/21 2115 -- -- 85 -- 99 % --   11/18/21 2100 -- -- 86 -- 99 % --   11/18/21 2045 -- -- 89 -- 99 % --   11/18/21 2030 -- -- 81 -- 99 % --   11/18/21 2015 -- -- 80 -- 100 % --   11/18/21 2000 98.6  F (37  C) Axillary 82 16 99 % --   11/18/21 1945 -- -- 80 -- 100 % --   11/18/21 1930 -- -- 79 -- 100 % --   11/18/21 1915 -- -- 80 -- 99 % --   11/18/21 1900 -- -- 80 14 99 % --   11/18/21 1845 -- -- 77 -- 100 % --   11/18/21 1830 -- -- 81 -- 99 % --   11/18/21 1815 -- -- 89 -- 100 % --   11/18/21 1800 -- -- 81 14 100 % --   11/18/21 1745 -- -- 78 -- 99 % --   11/18/21 1730 -- -- 79 -- 100 % --   11/18/21 1715 -- -- 78 -- 100 % --   11/18/21 1700 -- -- 79 14 100 % --   11/18/21 1645 -- -- 79 -- 100 % --   11/18/21 1630 -- -- 78 -- 100 % --   11/18/21 1615 -- -- 77 -- 100 % --   11/18/21 1600 97.6  F (36.4  C) Axillary 79 14 93 % --   11/18/21 1530 -- -- 75 -- 100 % --   11/18/21 1500 -- -- 76 14 100 % --     General Appearance: medically sedated   Skin: warm, dry, no jaundice. See abd exam  Heart: regular rate and rhythm  Lungs: ventilator breaths  Abdomen: soft, no guarding, wound VAC in place with s/s output.  :Jurado present with small amount of clear UOP  Extremities: edema: BRIGHT +2 bilaterally  Neurologic: Medically sedated    Frailty  Scores       Frailty Scores 3/23/2021 3/13/2021    Final Score Not Frail Not Frail    Final Score Number 0 0            Data:   CMP  Recent Labs   Lab 11/19/21  0829 11/19/21  0541 11/19/21  0533 11/18/21  2336 11/18/21  2334 11/18/21  1559 11/18/21  1446 11/18/21  1437   NA  --   --  140  --   --   --  143  --    POTASSIUM  --   --  3.6  --  3.7  --  3.2*  --    CHLORIDE  --   --  111*  --   --   --  110*  --    CO2  --   --  22  --   --   --  26  --    * 120* 128*   < >  --    < > 128*  --    BUN  --   --  21  --   --   --  14  --    CR  --   --  0.89  --   --   --  0.55  --    GFRESTIMATED  --   --  72  --   --   --  >90  --    MARIELENA  --   --  8.6  --   --   --  9.3  --    ICAW  --   --  4.9  --   --   --   --  5.0   MAG  --   --  1.8  --  1.6  --  1.7  --    PHOS  --   --  3.9  --  3.1  --  2.7  --    AMYLASE  --   --  36  --   --   --  31  --    LIPASE  --   --  83  --   --   --  156  --    ALBUMIN  --   --  2.5*  --   --   --  1.6*  1.6*  --    BILITOTAL  --   --  1.3  --   --   --  3.1*  3.1*  --    ALKPHOS  --   --  36*  --   --   --  41  41  --    AST  --   --  98*  --   --   --  280*  280*  --    ALT  --   --  140*  --   --   --  200*  200*  --     < > = values in this interval not displayed.     CBC  Recent Labs   Lab 11/19/21  0533 11/19/21  0127 11/19/21  0126   HGB 8.4* 9.4*  --    WBC 16.6* 16.6*  --    PLT 61* 78*  --    A1C  --   --  5.4     COAGS  Recent Labs   Lab 11/19/21  0533 11/19/21  0126   INR 1.63* 1.63*   PTT 33 34      Urinalysis  Recent Labs   Lab Test 11/06/21  0509 10/25/21  1339 06/10/21  0632 05/21/21  0730   COLOR Dark Yellow* Yellow   < > Dark Yellow   APPEARANCE Clear Clear   < > Clear   URINEGLC Negative Negative   < > Negative   URINEBILI Moderate* Small*   < > Negative   URINEKETONE Negative Negative   < > Negative   SG 1.018 1.017   < > 1.031   UBLD Negative Negative   < > Negative   URINEPH 5.5 5.5   < > 6.0   PROTEIN Negative Negative   < > 20*   NITRITE Negative  Negative   < > Negative   LEUKEST Negative Negative   < > Negative   RBCU <1 <1   < > 1   WBCU 1 1   < > 4   UTPG  --   --   --  0.26*    < > = values in this interval not displayed.     Virology:  Hepatitis C Antibody   Date Value Ref Range Status   11/17/2021 Nonreactive Nonreactive Final

## 2021-11-19 NOTE — PHARMACY-CONSULT NOTE
Pharmacy Tube Feeding Consult    Medication reviewed for administration by feeding tube and for potential food/drug interactions.    Recommendation: No changes are needed at this time.     Pharmacy will continue to follow as new medications are ordered.    Alice AlvaradoD

## 2021-11-19 NOTE — PLAN OF CARE
Major Shift Events:  Pt vss, and afebrile. Pt had liver tx today and was transferred to icu around 1430. Pt is on vent, peep 5, rate 14 and FiO2 40%. Pt is on fent and prop for sedation. Pt maintaining MAP > 60 on vaso and levo. Pt has insulin gtt running. Pt has wound vac to open abdomen, have had close to 1.5 L of output since pt made it to the floor. Pt has ferguson in and has good UO. Pt OG to LIS.     Plan: Wean sedation and pressors as tolerated.     For vital signs and complete assessments, please see documentation flowsheets.

## 2021-11-19 NOTE — PROCEDURES
Small Bowel Feeding Tube Placement Assessment  Reason for Feeding Tube Placement: Team request for post pyloric FT   Cortrak Start Time: 1500   Cortrak End Time: 1525  Medicine Delivered During Procedure: Lidocaine gel   Placement Successful: Presume FT tip within stomach (AXR pending)     Procedure Complications: Unable to adv to small bowel, repeatedly coiling within stomach   Final Placement Clay at exit of nare: 105 cm  Face to Face time with patient: 25 min       Bridle Placement:   Reason for bridle placement: Securement of FT    Medicine delivered during procedure: lidocaine gel   Procedure: Successful  Location of top of clip on FT: @ 106 cm marker   Condition of nose/skin at time of bridle placement: Unremarkable   Face to Face time with patient: 5 minutes.    Lluvia Mendoza RD, LD  [id number]  Pgr: [id number]

## 2021-11-19 NOTE — PLAN OF CARE
Major Shift Events:  Pt went to OR for wash out came back closed. NG placed and xray done to confirm placement. Ultrasound completed. Levo titrate to maintain MAP. CVP 6. 500 of albumin given. Tolerating vent settings. Urine OP 10-20 hr. MD is aware.   Plan: Continue to monitor notify MD of concerns.  For vital signs and complete assessments, please see documentation flowsheets.

## 2021-11-19 NOTE — OP NOTE
"Transplant Center   Operative Note     Procedure date:  11/18/21    Preoperative diagnosis:  End Stage Liver Disease due to primary sclerosing cholangitis  Previous attempted liver transplant which was aborted due to ventricular tachycardia intraoperative.    Postoperative diagnosis:  Same,   10 X15 cnAbscess on the right upper quadrant in the region of the right adrenal gland.  Gram stain of the abscess showed gram-positive cocci.    Procedure:  1.Liver transplant   2. Lysis of adhesions taking an additional 60 minutes of operating time  3.  Drainage of abscess on the right upper quadrant adrenal gland region.   4.  Closure of the abdomen with ABThera device    Surgeon:  Surgeon(s) and Role:     * Ernesto Schmitt MD - Primary     * Negro Bullard MD - Fellow - Assisting     * Alan Morales MD - Fellow - Assisting    Fellow/assistant:  There was no qualified resident to assist with this procedure, Dr. Morales assisted for the entire procedure    Anesthesia:  General    Specimen:  pus for culture sensitivity, explant liver.    Drains:  None    Urine output:      Estimated blood loss:  5,000    Fluids administered:    Fluid Amount   Crystalloid (mL) 8,000   Colloid (mL) 2,000   RBC (Units) 22   FFP (Units) 13   Cryoprecipitate (Units)  2   Platelets (Units) 3   Cell Saver (CCs) 2,400        Intraoperative Events: Abscess cavity noted on the right upper quadrant.    Complications: None    Findings:   #1.  Very dense vascular adhesions with very severe portal hypertension, frozen upper abdomen.  #2.  10 x 15 cm abscess cavity in the region of the right adrenal gland with thick yellow pus  #3.  Shrunken liver with close to 6 L of ascites    Brief procedure:    Orthotopic liver transplant with caval replacement, portal bypass, portal vein end-to-end anastomosis, donor celiac axis anastomosed to the recipient hepatic artery bifurcation end-to-end arterial anastomosis, bile duct and to end using \"Ink. Well\" technique " over 8 Azerbaijani feeding tube stent.  Due to coagulopathy and pus in the abdomen, abdomen was closed with an ABThera device.  Patient received intraoperative dialysis.    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 A suitable donor offer has become available. After discussing the risks and benefits of proceeding, the patient agreed to proceed with surgery and provided informed consent.    Final ABO/Crossmatch verification: After the donor organ arrived to the operating room and prior to anastomosis, I participated in the transplant pre-verification upon organ receipt timeout by visually verifying the donor ID, organ and laterality, donor blood type, recipient unique identifier, recipient blood type, and that the donor and recipient are blood type compatible.     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    Fellow/Assistant:   There was no qualified resident to assist with this procedure    Anesthesia:  None    Graft biopsy:  No    Macroscopic steatosis:  None.    Back table reconstruction:  No Reconstruction                                 Findings:   Liver Laceration: No  Overall quality of liver:  Excellent small liver.    Back Table Procedure: The liver allograft was received and inspected and the aforementioned findings were noted. It had been previously flushed with UW. The donor liver was placed in fresh ice-cold preservation solution. We identified the inferior vena cava. Two stay sutures were placed on the supra-hepatic portion of the cava. Two stay sutures were placed on the infra-hepatic portion of the cava. The fibro-fatty tissue and adrenal gland was cleared of inferior vena cava. The phrenic vein was ligated. The adrenal vein was ligated. The IVC was tested for leaks by using a bulb syringe. We suture ligated all identified leaks. The portal vein was identified. All the fibro-fatty area or tissue around the portal vein was removed and the portal vein was dissected up to its bifurcation. An 8-Beninese cannula was placed in the portal vein and fixed with a stitch. The portal vein was tested for leaks. We suture ligated all identified leaks. The cannula was left in place to be used for flushing the liver at the time of implantation. The hepatic artery anatomy was identified. The celiac axis  was traced all the way from the aortic patch to the level of the gastro-duodenal artery. Dissection was stopped at the level of the gastro-duodenal artery. All the leaks in the hepatic artery tributaries were suture ligated. The bile duct was inspected and flushed. No reconstruction was required. The liver was placed back in ice-cold preservation solution until ready for transplantation. Faculty was present for the critical portions of the procedure.    Findings:    Operative Procedure:   Arterial anastomosis start:  11/18/2021 10:56 AM    Recipient arterial unclamp:  11/18/2021 12:29 PM    Extra vessels used: no    Extra vessels banked:  Yes    Previous upper abd surgery:  Yes; previous implanted transplant with a Mercedes incision.    Previous cholecystectomy?  No    Portal vein:  Thrombus: No   Patent: Yes-         On portal bypass:  Yes-      Arterial flow:  Sufficient: Yes-donor celiac axis to the recipient hepatic artery bifurcation    Bile duct anastomosis:    To duct: Yes-the donor duct was about 6 mm, the recipient duct was about 15 mm.  We did an end-to-end anastomosis using the ink well technique over 8 Swiss feeding tube stent.        Berta Nava was brought to the operating room, placed in a supine position, and a time out was performed. Sequential compression devices were placed on both lower extremities and general endotracheal anesthesia was induced. The patient was given IV antibiotics, and Solumedrol. A Jurado catheter was placed. A central line was placed by Anesthesia service. The abdomen was then shaved, prepped, and draped in the usual sterile fashion.  The backtable preparation occurred prior to implantation.    We entered the abdominal cavity using Vertical Extension (Kristy) incision. The abdomen was examined. Lysis of adhesions took  an additional 60  minutes of operating time. We proceeded to mobilization of the liver first by dividing falciform ligament, next dividing the triangular ligaments and mobilizing the left lobe with further evaluation of the right lobe of the liver. Next the right lobe of the liver was mobilized. We elected to proceed with a hilar dissection. The right and left hepatic arteries were identified, ligated and divided, followed by ligation and division of the common bile duct. The portal vein was cleared from tissue and small branches were tied off and divided. The left and right portal veins were separately ligated and the portal vein was divided. At this point we went on the bypass with bypass flow of 1 liter per minute. Next, we continued mobilizing the right lobe. The adhesions between the right lobe and the right diaphragm were divided and the lobe was mobilized up to the vena cava. The hepatic veins were identified. Next, we dissected out the caudate lobe and  infrahepatic IVC.     We applied Infrahepatic and suprahepatic clamps to the IVC and the liver was excised with curved Manzano scissors. The recipient's liver was passed off from the operating table. Next, complete hemostasis was obtained. The donor liver was brought into the field. The suprahepatic IVC anastomosis was constructed with 3-0 Prolene followed by the construction of infrahepatic anastomosis with 4-0 Prolene. Next, we came off the bypass and end-to-end portal anastomosis was constructed between the donor and recipient portal veins using 6-0 Prolene. During the construction of the infrahepatic IVC anastomosis, the liver was flushed with 5% albumin. Once the portal anastomosis was constructed, the liver was reperfused. Complete hemostasis was obtained and arterial anastomosis was performed between the donor celiac trunk patch using Rodriguez technique and the bifurcation of the right and left recipient hepatic arteries. After clamps were released, there was a good flow within the donor artery. Again, all the arterial branches that were bleeding were ligated and complete hemostasis was obtained. After the anastomosis was performed, good flow was re-established, hemostasis was obtained. Next, the biliary anastomosis was performed using a 6-0 running PDS suture over the stent.     Next again the abdomen was irrigated and hemostasis was obtained. Due to ESLD, coagulopathy and bleeding was encountered. Therefore, the decision was made to pack the abdomen and apply a temporary abdominal closure using ABThera device. All needle, sponge and instrument counts were correct x 2. Faculty was present for key portions of the procedure. The patient was  transferred to the ICU for post-op monitoring.

## 2021-11-19 NOTE — PLAN OF CARE
Major Shift Events:  Pt sedated on prop and fentanyl. Moves all extremities and intermittently follows commands. SR. CVPs ranged from 4-11. Vaso turned off, levo at 0.12. Clear lung sounds, remains on CMV settings (see flowsheets). OG to LIS, 400 mL bile output. Jurado, with decreased urine output. Lasix given x1 with no response. No BM. Open abdomen with wound vac at -100 mmHg with decreasing output as shift went on. 1000 mL albumin given. Insulin drip algorithm 3.     Plan: OR Saturday to close abdomen. Titrate pressors as needed. Continue to replace NG output with NS.     For vital signs and complete assessments, please see documentation flowsheets.

## 2021-11-19 NOTE — ANESTHESIA PREPROCEDURE EVALUATION
Anesthesia Pre-Procedure Evaluation    Patient: Berta Nava   MRN: [id number] : 1965        Preoperative Diagnosis: Open wound of abdomen, subsequent encounter [S31.109D]    Procedure : Procedure(s):  CLOSURE, WOUND, ABDOMEN, SECONDARY          Past Medical History:   Diagnosis Date     Ascites      Biliary cirrhosis (H)      Cholangitis, sclerosing      Cirrhosis of liver with ascites (H) 3/3/2021     Hearing loss of left ear     wears a hearing aide     History of low potassium      Hyperlipidemia      Hypertension      SBP (spontaneous bacterial peritonitis) (H) 2021     Sjogren's syndrome (H)      Ulcerative colitis (H)      Ulcerative pancolitis (H)       Past Surgical History:   Procedure Laterality Date     BENCH LIVER N/A 2021    Procedure: Bench liver;  Surgeon: Ernesto Schmitt MD;  [facility]: [facility]     CV CORONARY ANGIOGRAM N/A 2021    Procedure: Coronary Angiogram with possible intervention;  Surgeon: David Wilhelm MD;  Location:  [facility]     ENDOSCOPIC RETROGRADE CHOLANGIOPANCREATOGRAM N/A 2021    Procedure: ENDOSCOPIC RETROGRADE CHOLANGIOPANCREATOGRAPHY with ballon sweep of bile ducts for stones, balloon dilation of bile ducts and bile duct stent placement;  Surgeon: Gregory Gabriel MD;  Location:  OR     ENDOSCOPIC RETROGRADE CHOLANGIOPANCREATOGRAM N/A 2021    Procedure: ENDOSCOPIC RETROGRADE CHOLANGIOPANCREATOGRAPHY STONE REMOVAL, DILATION AND STENT PLACEMENT;  Surgeon: Gregory Gabriel MD;  Location:  OR     ENDOSCOPIC RETROGRADE CHOLANGIOPANCREATOGRAPHY, EXCHANGE TUBE/STENT N/A 2021    Procedure: ENDOSCOPIC RETROGRADE CHOLANGIOPANCREATOGRAPHY WITH STENT EXCHANGE, STONE EXTRACTION, AND DILATION;  Surgeon: Gregory Gabriel MD;  Location:  OR     ENDOSCOPIC RETROGRADE CHOLANGIOPANCREATOGRAPHY, EXCHANGE TUBE/STENT N/A 5/10/2021    Procedure: ENDOSCOPIC RETROGRADE CHOLANGIOPANCREATOGRAPHY with biliary dilation, stone removal,  stent exchange;  Surgeon: Gregory Gabriel MD;  [facility]     ENDOSCOPIC RETROGRADE CHOLANGIOPANCREATOGRAPHY, EXCHANGE TUBE/STENT N/A 6/10/2021    Procedure: ENDOSCOPIC RETROGRADE CHOLANGIOPANCREATOGRAPHY, WITH biliary stent exchange, stone removal;  Surgeon: Woodrow Lott MD;  [facility]     ENDOSCOPIC RETROGRADE CHOLANGIOPANCREATOGRAPHY, EXCHANGE TUBE/STENT N/A 2021    Procedure: ENDOSCOPIC RETROGRADE CHOLANGIOPANCREATOGRAPHY with biliary dilation, stone removal, stent exchange;  Surgeon: Gregory Gabriel MD;  [facility]     ENDOSCOPIC RETROGRADE CHOLANGIOPANCREATOGRAPHY, EXCHANGE TUBE/STENT N/A 10/1/2021    Procedure: ENDOSCOPIC RETROGRADE CHOLANGIOPANCREATOGRAPHY with balloon sweep of bile ducts, bile duct stent exchanged;  Surgeon: Gregory Gabriel MD;  [facility]     ESOPHAGOSCOPY, GASTROSCOPY, DUODENOSCOPY (EGD), COMBINED N/A 2021    Procedure: ESOPHAGOGASTRODUODENOSCOPY (EGD);  Surgeon: Leventhal, Thomas Michael, MD;  [facility]     ESOPHAGOSCOPY, GASTROSCOPY, DUODENOSCOPY (EGD), COMBINED N/A 10/1/2021    Procedure: ESOPHAGOGASTRODUODENOSCOPY (EGD) with varices banding;  Surgeon: Gregory Gabriel MD;  [facility]     GYN SURGERY      bilat fallopian tubes and ovaries removed     PICC DOUBLE LUMEN PLACEMENT Right 2021    42cm (2cm external), Lateral brachial vein     TRANSPLANT LIVER RECIPIENT  DONOR N/A 2021    Procedure: Opening of abdomen, Abdominal Exploration and aborted liver transplant.;  Surgeon: Ernesto Schmitt MD;  [facility]     TRANSPLANT LIVER RECIPIENT  DONOR N/A 2021    Procedure: TRANSPLANT, LIVER, RECIPIENT,  DONOR;  Surgeon: Ernesto Schmitt MD;  [facility]      Allergies   Allergen Reactions     Diagnostic X-Ray Materials Hives     PATIENT HAD HIVE REACTION AFTER ADMINISTERING CT CONTRAST DYE.       Contrast Dye       Social History     Tobacco Use     Smoking status: Never Smoker      Smokeless tobacco: Never Used   Substance Use Topics     Alcohol use: No     Comment: Last drink was 2017      Wt Readings from Last 1 Encounters:   11/19/21 55.5 kg (122 lb 5.7 oz)        Anesthesia Evaluation   Pt has had prior anesthetic. Type: General and MAC.        ROS/MED HX  ENT/Pulmonary:  - neg pulmonary ROS     Neurologic:  - neg neurologic ROS     Cardiovascular:       METS/Exercise Tolerance:     Hematologic:     (+) anemia, history of blood transfusion, no previous transfusion reaction, Known PRBC Anitbodies:No     Musculoskeletal:   (+) arthritis,     GI/Hepatic:       Renal/Genitourinary:       Endo:     (+) Chronic steroid usage for Post Transplant Immunosuppression.     Psychiatric/Substance Use:     (+) alcohol abuse     Infectious Disease:  - neg infectious disease ROS     Malignancy:       Other:            Physical Exam    Airway   unable to assess          Respiratory Devices and Support         Dental    unable to assess        Cardiovascular          Rhythm and rate: regular and normal     Pulmonary   pulmonary exam normal        breath sounds clear to auscultation           OUTSIDE LABS:  CBC:   Lab Results   Component Value Date    WBC 16.6 (H) 11/19/2021    WBC 16.6 (H) 11/19/2021    HGB 7.9 (L) 11/19/2021    HGB 8.4 (L) 11/19/2021    HCT 23.7 (L) 11/19/2021    HCT 26.2 (L) 11/19/2021    PLT 61 (L) 11/19/2021    PLT 78 (L) 11/19/2021     BMP:   Lab Results   Component Value Date     11/19/2021     11/18/2021    NA  11/18/2021      Comment:      Questionable result. Please disregard. Redraw requested.  This is a corrected result. Previous result was 140 mmol/L on 11/18/2021 at  6:10 PM CST    POTASSIUM 3.6 11/19/2021    POTASSIUM 3.7 11/18/2021    CHLORIDE 111 (H) 11/19/2021    CHLORIDE 110 (H) 11/18/2021    CHLORIDE  11/18/2021      Comment:      Questionable result. Please disregard. Redraw requested.  This is a corrected result. Previous result was 108 mmol/L on 11/18/2021  at  6:10 PM CST    CO2 22 11/19/2021    CO2 26 11/18/2021    CO2  11/18/2021      Comment:      Questionable result. Please disregard. Redraw requested.  This is a corrected result. Previous result was 23 mmol/L on 11/18/2021 at  6:10 PM CST    BUN 21 11/19/2021    BUN 14 11/18/2021    BUN  11/18/2021      Comment:      Questionable result. Please disregard. Redraw requested.  This is a corrected result. Previous result was 16 mg/dL on 11/18/2021 at  6:10 PM CST    CR 0.89 11/19/2021    CR 0.55 11/18/2021    CR  11/18/2021      Comment:      Questionable result. Please disregard. Redraw requested.  This is a corrected result. Previous result was 0.61 mg/dL on 11/18/2021 at  6:10 PM CST     (H) 11/19/2021     (H) 11/19/2021     COAGS:   Lab Results   Component Value Date    PTT 34 11/19/2021    INR 1.63 (H) 11/19/2021    FIBR 297 11/19/2021     POC:   Lab Results   Component Value Date    BGM 95 06/25/2021     HEPATIC:   Lab Results   Component Value Date    ALBUMIN 2.5 (L) 11/19/2021    PROTTOTAL 4.4 (L) 11/19/2021     (H) 11/19/2021    AST 98 (H) 11/19/2021    ALKPHOS 36 (L) 11/19/2021    BILITOTAL 1.3 11/19/2021    MIHIR 26 11/19/2021     OTHER:   Lab Results   Component Value Date    PH 7.43 11/19/2021    LACT 1.8 11/19/2021    A1C 5.4 11/19/2021    MARIELENA 8.6 11/19/2021    PHOS 3.9 11/19/2021    MAG 1.8 11/19/2021    LIPASE 83 11/19/2021    AMYLASE 36 11/19/2021    CRP 54.0 (H) 11/17/2021       Anesthesia Plan    ASA Status:  3   NPO Status:  NPO Appropriate    Anesthesia Type: General.     - Airway: ETT   Induction: Inhalation.           Consents    Anesthesia Plan(s) and associated risks, benefits, and realistic alternatives discussed. Questions answered and patient/representative(s) expressed understanding.    - Discussed:     - Discussed with:  Implied consent/emergency      - Extended Intubation/Ventilatory Support Discussed: No.      - Patient is DNR/DNI Status: No    Use of blood  products discussed: No .     Postoperative Care       PONV prophylaxis: Ondansetron (or other 5HT-3)     Comments:                Scot Randolph MD

## 2021-11-19 NOTE — BRIEF OP NOTE
[facility]    Brief Operative Note    Pre-operative diagnosis: Open wound of abdomen, subsequent encounter [M58.211S]  Post-operative diagnosis Same as pre-operative diagnosis    Procedure: Procedure(s):  CLOSURE, WOUND, ABDOMEN, SECONDARY, ABDOMINAL WASHOUT  Surgeon: Surgeon(s) and Role:     * Ernesto Schmitt MD - Primary     * Negro Bullard MD - Fellow - Assisting     * Alan Morales MD - Fellow - Assisting  Anesthesia: General   Estimated Blood Loss: Less than 50 ml    Drains: Ottoniel-Burt  Specimens: * No specimens in log *  Findings:   minimal clot, healthy appearing graft, two quick clot lap pads removed.  Complications: None.  Implants: * No implants in log *

## 2021-11-20 ENCOUNTER — APPOINTMENT (OUTPATIENT)
Dept: GENERAL RADIOLOGY | Facility: CLINIC | Age: 56
DRG: 005 | End: 2021-11-20
Payer: COMMERCIAL

## 2021-11-20 LAB
ABO/RH(D): NORMAL
ALBUMIN SERPL-MCNC: 2.1 G/DL (ref 3.4–5)
ALBUMIN SERPL-MCNC: 2.3 G/DL (ref 3.4–5)
ALBUMIN UR-MCNC: 30 MG/DL
ALP SERPL-CCNC: 33 U/L (ref 40–150)
ALP SERPL-CCNC: 37 U/L (ref 40–150)
ALT SERPL W P-5'-P-CCNC: 84 U/L (ref 0–50)
ALT SERPL W P-5'-P-CCNC: 92 U/L (ref 0–50)
ANION GAP SERPL CALCULATED.3IONS-SCNC: 11 MMOL/L (ref 3–14)
ANTIBODY SCREEN: NEGATIVE
APPEARANCE UR: CLEAR
APTT PPP: 33 SECONDS (ref 22–38)
APTT PPP: 33 SECONDS (ref 22–38)
AST SERPL W P-5'-P-CCNC: 28 U/L (ref 0–45)
AST SERPL W P-5'-P-CCNC: 36 U/L (ref 0–45)
BASE EXCESS BLDA CALC-SCNC: -4.6 MMOL/L (ref -9–1.8)
BASE EXCESS BLDA CALC-SCNC: -5.9 MMOL/L (ref -9–1.8)
BASOPHILS # BLD AUTO: 0 10E3/UL (ref 0–0.2)
BASOPHILS NFR BLD AUTO: 0 %
BILIRUB DIRECT SERPL-MCNC: 0.6 MG/DL (ref 0–0.2)
BILIRUB DIRECT SERPL-MCNC: 0.7 MG/DL (ref 0–0.2)
BILIRUB SERPL-MCNC: 0.9 MG/DL (ref 0.2–1.3)
BILIRUB SERPL-MCNC: 1 MG/DL (ref 0.2–1.3)
BILIRUB UR QL STRIP: NEGATIVE
BUN SERPL-MCNC: 27 MG/DL (ref 7–30)
CA-I BLD-MCNC: 4.1 MG/DL (ref 4.4–5.2)
CALCIUM SERPL-MCNC: 8 MG/DL (ref 8.5–10.1)
CHLORIDE BLD-SCNC: 110 MMOL/L (ref 94–109)
CO2 SERPL-SCNC: 16 MMOL/L (ref 20–32)
COLOR UR AUTO: YELLOW
CREAT SERPL-MCNC: 1.65 MG/DL (ref 0.52–1.04)
EOSINOPHIL # BLD AUTO: 0 10E3/UL (ref 0–0.7)
EOSINOPHIL NFR BLD AUTO: 0 %
ERYTHROCYTE [DISTWIDTH] IN BLOOD BY AUTOMATED COUNT: 17.2 % (ref 10–15)
FIBRINOGEN PPP-MCNC: 348 MG/DL (ref 170–490)
FIBRINOGEN PPP-MCNC: 369 MG/DL (ref 170–490)
GFR SERPL CREATININE-BSD FRML MDRD: 34 ML/MIN/1.73M2
GLUCOSE BLD-MCNC: 176 MG/DL (ref 70–99)
GLUCOSE BLDC GLUCOMTR-MCNC: 124 MG/DL (ref 70–99)
GLUCOSE BLDC GLUCOMTR-MCNC: 125 MG/DL (ref 70–99)
GLUCOSE BLDC GLUCOMTR-MCNC: 140 MG/DL (ref 70–99)
GLUCOSE BLDC GLUCOMTR-MCNC: 154 MG/DL (ref 70–99)
GLUCOSE BLDC GLUCOMTR-MCNC: 167 MG/DL (ref 70–99)
GLUCOSE BLDC GLUCOMTR-MCNC: 93 MG/DL (ref 70–99)
GLUCOSE UR STRIP-MCNC: NEGATIVE MG/DL
HCO3 BLD-SCNC: 17 MMOL/L (ref 21–28)
HCO3 BLD-SCNC: 18 MMOL/L (ref 21–28)
HCT VFR BLD AUTO: 21.6 % (ref 35–47)
HGB BLD-MCNC: 7.4 G/DL (ref 11.7–15.7)
HGB UR QL STRIP: ABNORMAL
HYALINE CASTS: 8 /LPF
IMM GRANULOCYTES # BLD: 0.1 10E3/UL
IMM GRANULOCYTES NFR BLD: 1 %
INR PPP: 1.57 (ref 0.85–1.15)
KETONES UR STRIP-MCNC: NEGATIVE MG/DL
LACTATE SERPL-SCNC: 1.1 MMOL/L (ref 0.7–2)
LACTATE SERPL-SCNC: 1.2 MMOL/L (ref 0.7–2)
LEUKOCYTE ESTERASE UR QL STRIP: NEGATIVE
LYMPHOCYTES # BLD AUTO: 0.4 10E3/UL (ref 0.8–5.3)
LYMPHOCYTES NFR BLD AUTO: 2 %
MAGNESIUM SERPL-MCNC: 1.8 MG/DL (ref 1.6–2.3)
MCH RBC QN AUTO: 29.7 PG (ref 26.5–33)
MCHC RBC AUTO-ENTMCNC: 34.3 G/DL (ref 31.5–36.5)
MCV RBC AUTO: 87 FL (ref 78–100)
MONOCYTES # BLD AUTO: 0.4 10E3/UL (ref 0–1.3)
MONOCYTES NFR BLD AUTO: 3 %
MUCOUS THREADS #/AREA URNS LPF: PRESENT /LPF
NEUTROPHILS # BLD AUTO: 16.6 10E3/UL (ref 1.6–8.3)
NEUTROPHILS NFR BLD AUTO: 94 %
NITRATE UR QL: NEGATIVE
NRBC # BLD AUTO: 0 10E3/UL
NRBC BLD AUTO-RTO: 0 /100
O2/TOTAL GAS SETTING VFR VENT: 30 %
O2/TOTAL GAS SETTING VFR VENT: 30 %
PCO2 BLD: 25 MM HG (ref 35–45)
PCO2 BLD: 25 MM HG (ref 35–45)
PH BLD: 7.45 [PH] (ref 7.35–7.45)
PH BLD: 7.48 [PH] (ref 7.35–7.45)
PH UR STRIP: 5 [PH] (ref 5–7)
PHOSPHATE SERPL-MCNC: 5.3 MG/DL (ref 2.5–4.5)
PLATELET # BLD AUTO: 52 10E3/UL (ref 150–450)
PO2 BLD: 172 MM HG (ref 80–105)
PO2 BLD: 177 MM HG (ref 80–105)
POTASSIUM BLD-SCNC: 3.7 MMOL/L (ref 3.4–5.3)
PROT SERPL-MCNC: 4.3 G/DL (ref 6.8–8.8)
PROT SERPL-MCNC: 4.5 G/DL (ref 6.8–8.8)
RBC # BLD AUTO: 2.49 10E6/UL (ref 3.8–5.2)
RBC URINE: 7 /HPF
SODIUM SERPL-SCNC: 137 MMOL/L (ref 133–144)
SP GR UR STRIP: 1.02 (ref 1–1.03)
SPECIMEN EXPIRATION DATE: NORMAL
SQUAMOUS EPITHELIAL: 1 /HPF
TACROLIMUS BLD-MCNC: 8.7 UG/L (ref 5–15)
TME LAST DOSE: NORMAL H
TME LAST DOSE: NORMAL H
TRANSITIONAL EPI: <1 /HPF
UROBILINOGEN UR STRIP-MCNC: NORMAL MG/DL
WBC # BLD AUTO: 17.5 10E3/UL (ref 4–11)
WBC URINE: 3 /HPF

## 2021-11-20 PROCEDURE — 250N000013 HC RX MED GY IP 250 OP 250 PS 637: Performed by: TRANSPLANT SURGERY

## 2021-11-20 PROCEDURE — 83735 ASSAY OF MAGNESIUM: CPT | Performed by: SURGERY

## 2021-11-20 PROCEDURE — 80197 ASSAY OF TACROLIMUS: CPT | Performed by: SURGERY

## 2021-11-20 PROCEDURE — 250N000011 HC RX IP 250 OP 636: Performed by: SURGERY

## 2021-11-20 PROCEDURE — 82248 BILIRUBIN DIRECT: CPT | Performed by: SURGERY

## 2021-11-20 PROCEDURE — 999N000127 HC STATISTIC PERIPHERAL IV START W US GUIDANCE

## 2021-11-20 PROCEDURE — 82803 BLOOD GASES ANY COMBINATION: CPT | Performed by: SURGERY

## 2021-11-20 PROCEDURE — 83605 ASSAY OF LACTIC ACID: CPT | Performed by: SURGERY

## 2021-11-20 PROCEDURE — 85730 THROMBOPLASTIN TIME PARTIAL: CPT | Performed by: SURGERY

## 2021-11-20 PROCEDURE — 250N000012 HC RX MED GY IP 250 OP 636 PS 637: Performed by: SURGERY

## 2021-11-20 PROCEDURE — 250N000012 HC RX MED GY IP 250 OP 636 PS 637: Performed by: NURSE PRACTITIONER

## 2021-11-20 PROCEDURE — 250N000013 HC RX MED GY IP 250 OP 250 PS 637: Performed by: SURGERY

## 2021-11-20 PROCEDURE — 85048 AUTOMATED LEUKOCYTE COUNT: CPT | Performed by: SURGERY

## 2021-11-20 PROCEDURE — 85384 FIBRINOGEN ACTIVITY: CPT | Performed by: SURGERY

## 2021-11-20 PROCEDURE — 81001 URINALYSIS AUTO W/SCOPE: CPT

## 2021-11-20 PROCEDURE — 999N000065 XR CHEST PORT 1 VIEW

## 2021-11-20 PROCEDURE — 80053 COMPREHEN METABOLIC PANEL: CPT | Performed by: SURGERY

## 2021-11-20 PROCEDURE — 250N000009 HC RX 250: Performed by: SURGERY

## 2021-11-20 PROCEDURE — 99207 PR SATISFY VISIT NUMBER: CPT | Performed by: TRANSPLANT SURGERY

## 2021-11-20 PROCEDURE — 94002 VENT MGMT INPAT INIT DAY: CPT

## 2021-11-20 PROCEDURE — 99231 SBSQ HOSP IP/OBS SF/LOW 25: CPT | Performed by: SURGERY

## 2021-11-20 PROCEDURE — 258N000003 HC RX IP 258 OP 636: Performed by: SURGERY

## 2021-11-20 PROCEDURE — 250N000011 HC RX IP 250 OP 636: Performed by: STUDENT IN AN ORGANIZED HEALTH CARE EDUCATION/TRAINING PROGRAM

## 2021-11-20 PROCEDURE — 85610 PROTHROMBIN TIME: CPT | Performed by: SURGERY

## 2021-11-20 PROCEDURE — 258N000001 HC RX 258: Performed by: SURGERY

## 2021-11-20 PROCEDURE — 94003 VENT MGMT INPAT SUBQ DAY: CPT

## 2021-11-20 PROCEDURE — 84100 ASSAY OF PHOSPHORUS: CPT | Performed by: SURGERY

## 2021-11-20 PROCEDURE — 36415 COLL VENOUS BLD VENIPUNCTURE: CPT | Performed by: SURGERY

## 2021-11-20 PROCEDURE — 250N000009 HC RX 250

## 2021-11-20 PROCEDURE — 250N000013 HC RX MED GY IP 250 OP 250 PS 637

## 2021-11-20 PROCEDURE — 82330 ASSAY OF CALCIUM: CPT | Performed by: SURGERY

## 2021-11-20 PROCEDURE — 250N000011 HC RX IP 250 OP 636: Performed by: TRANSPLANT SURGERY

## 2021-11-20 PROCEDURE — 999N000157 HC STATISTIC RCP TIME EA 10 MIN

## 2021-11-20 PROCEDURE — 82310 ASSAY OF CALCIUM: CPT | Performed by: SURGERY

## 2021-11-20 PROCEDURE — 250N000013 HC RX MED GY IP 250 OP 250 PS 637: Performed by: STUDENT IN AN ORGANIZED HEALTH CARE EDUCATION/TRAINING PROGRAM

## 2021-11-20 PROCEDURE — P9041 ALBUMIN (HUMAN),5%, 50ML: HCPCS | Performed by: STUDENT IN AN ORGANIZED HEALTH CARE EDUCATION/TRAINING PROGRAM

## 2021-11-20 PROCEDURE — 86900 BLOOD TYPING SEROLOGIC ABO: CPT

## 2021-11-20 PROCEDURE — 71045 X-RAY EXAM CHEST 1 VIEW: CPT | Mod: 26 | Performed by: STUDENT IN AN ORGANIZED HEALTH CARE EDUCATION/TRAINING PROGRAM

## 2021-11-20 PROCEDURE — 200N000002 HC R&B ICU UMMC

## 2021-11-20 PROCEDURE — 250N000013 HC RX MED GY IP 250 OP 250 PS 637: Performed by: PHYSICIAN ASSISTANT

## 2021-11-20 RX ORDER — ASPIRIN 325 MG
325 TABLET ORAL DAILY
Status: DISCONTINUED | OUTPATIENT
Start: 2021-11-20 | End: 2021-11-27 | Stop reason: HOSPADM

## 2021-11-20 RX ORDER — OXYCODONE HYDROCHLORIDE 5 MG/1
5 TABLET ORAL EVERY 4 HOURS PRN
Status: DISCONTINUED | OUTPATIENT
Start: 2021-11-20 | End: 2021-11-27 | Stop reason: HOSPADM

## 2021-11-20 RX ORDER — METOLAZONE 5 MG/1
5 TABLET ORAL ONCE
Status: COMPLETED | OUTPATIENT
Start: 2021-11-20 | End: 2021-11-20

## 2021-11-20 RX ORDER — FLUCONAZOLE 2 MG/ML
200 INJECTION, SOLUTION INTRAVENOUS EVERY 24 HOURS
Status: DISCONTINUED | OUTPATIENT
Start: 2021-11-21 | End: 2021-11-24

## 2021-11-20 RX ORDER — ALBUMIN, HUMAN INJ 5% 5 %
25 SOLUTION INTRAVENOUS ONCE
Status: COMPLETED | OUTPATIENT
Start: 2021-11-20 | End: 2021-11-20

## 2021-11-20 RX ORDER — VALGANCICLOVIR 450 MG/1
450 TABLET, FILM COATED ORAL EVERY OTHER DAY
Status: DISCONTINUED | OUTPATIENT
Start: 2021-11-22 | End: 2021-11-24

## 2021-11-20 RX ORDER — VALGANCICLOVIR HYDROCHLORIDE 50 MG/ML
450 POWDER, FOR SOLUTION ORAL EVERY OTHER DAY
Status: DISCONTINUED | OUTPATIENT
Start: 2021-11-22 | End: 2021-11-23

## 2021-11-20 RX ORDER — HYDROMORPHONE HCL IN WATER/PF 6 MG/30 ML
.2-.4 PATIENT CONTROLLED ANALGESIA SYRINGE INTRAVENOUS
Status: DISCONTINUED | OUTPATIENT
Start: 2021-11-20 | End: 2021-11-27 | Stop reason: HOSPADM

## 2021-11-20 RX ORDER — PIPERACILLIN SODIUM, TAZOBACTAM SODIUM 2; .25 G/10ML; G/10ML
2.25 INJECTION, POWDER, LYOPHILIZED, FOR SOLUTION INTRAVENOUS EVERY 6 HOURS
Status: COMPLETED | OUTPATIENT
Start: 2021-11-20 | End: 2021-11-20

## 2021-11-20 RX ADMIN — METHYLPREDNISOLONE SODIUM SUCCINATE 100 MG: 125 INJECTION, POWDER, FOR SOLUTION INTRAMUSCULAR; INTRAVENOUS at 07:51

## 2021-11-20 RX ADMIN — SULFAMETHOXAZOLE AND TRIMETHOPRIM 1 TABLET: 400; 80 TABLET ORAL at 07:53

## 2021-11-20 RX ADMIN — ASPIRIN 325 MG ORAL TABLET 325 MG: 325 PILL ORAL at 10:20

## 2021-11-20 RX ADMIN — FUROSEMIDE 10 MG/HR: 10 INJECTION, SOLUTION INTRAVENOUS at 12:53

## 2021-11-20 RX ADMIN — MYCOPHENOLATE MOFETIL 750 MG: 200 POWDER, FOR SUSPENSION ORAL at 18:00

## 2021-11-20 RX ADMIN — TACROLIMUS 2 MG: 5 CAPSULE ORAL at 07:55

## 2021-11-20 RX ADMIN — PROPOFOL 30 MCG/KG/MIN: 10 INJECTION, EMULSION INTRAVENOUS at 01:44

## 2021-11-20 RX ADMIN — FLUCONAZOLE 400 MG: 400 INJECTION, SOLUTION INTRAVENOUS at 07:50

## 2021-11-20 RX ADMIN — DEXTROSE AND SODIUM CHLORIDE: 5; 450 INJECTION, SOLUTION INTRAVENOUS at 18:54

## 2021-11-20 RX ADMIN — PIPERACILLIN SODIUM AND TAZOBACTAM SODIUM 2.25 G: 2; .25 INJECTION, POWDER, LYOPHILIZED, FOR SOLUTION INTRAVENOUS at 13:30

## 2021-11-20 RX ADMIN — PIPERACILLIN AND TAZOBACTAM 3.38 G: 3; .375 INJECTION, POWDER, LYOPHILIZED, FOR SOLUTION INTRAVENOUS at 01:18

## 2021-11-20 RX ADMIN — Medication 1 PACKET: at 07:56

## 2021-11-20 RX ADMIN — DOCUSATE SODIUM 50 MG AND SENNOSIDES 8.6 MG 1 TABLET: 8.6; 5 TABLET, FILM COATED ORAL at 19:54

## 2021-11-20 RX ADMIN — OXYCODONE HYDROCHLORIDE 5 MG: 5 TABLET ORAL at 13:34

## 2021-11-20 RX ADMIN — Medication 15 ML: at 07:51

## 2021-11-20 RX ADMIN — Medication 40 MG: at 08:07

## 2021-11-20 RX ADMIN — POLYETHYLENE GLYCOL 3350 17 G: 17 POWDER, FOR SOLUTION ORAL at 07:53

## 2021-11-20 RX ADMIN — Medication 40 MG: at 18:00

## 2021-11-20 RX ADMIN — PIPERACILLIN AND TAZOBACTAM 3.38 G: 3; .375 INJECTION, POWDER, LYOPHILIZED, FOR SOLUTION INTRAVENOUS at 07:53

## 2021-11-20 RX ADMIN — Medication 400 MG: at 07:54

## 2021-11-20 RX ADMIN — Medication 400 MG: at 19:53

## 2021-11-20 RX ADMIN — VALGANCICLOVIR HYDROCHLORIDE 450 MG: 50 POWDER, FOR SOLUTION ORAL at 07:55

## 2021-11-20 RX ADMIN — TACROLIMUS 1.5 MG: 5 CAPSULE ORAL at 18:05

## 2021-11-20 RX ADMIN — DOCUSATE SODIUM 50 MG AND SENNOSIDES 8.6 MG 2 TABLET: 8.6; 5 TABLET, FILM COATED ORAL at 07:52

## 2021-11-20 RX ADMIN — MYCOPHENOLATE MOFETIL 750 MG: 200 POWDER, FOR SUSPENSION ORAL at 07:55

## 2021-11-20 RX ADMIN — ALBUMIN (HUMAN) 25 G: 12.5 INJECTION, SOLUTION INTRAVENOUS at 01:18

## 2021-11-20 RX ADMIN — VANCOMYCIN HYDROCHLORIDE 750 MG: 1 INJECTION, POWDER, LYOPHILIZED, FOR SOLUTION INTRAVENOUS at 10:20

## 2021-11-20 RX ADMIN — POTASSIUM CHLORIDE 20 MEQ: 1.5 POWDER, FOR SOLUTION ORAL at 01:18

## 2021-11-20 RX ADMIN — HUMAN INSULIN 4 UNITS/HR: 100 INJECTION, SOLUTION SUBCUTANEOUS at 09:41

## 2021-11-20 RX ADMIN — METOLAZONE 5 MG: 5 TABLET ORAL at 13:34

## 2021-11-20 ASSESSMENT — ACTIVITIES OF DAILY LIVING (ADL)
ADLS_ACUITY_SCORE: 18
ADLS_ACUITY_SCORE: 16
ADLS_ACUITY_SCORE: 21
ADLS_ACUITY_SCORE: 21
ADLS_ACUITY_SCORE: 18
ADLS_ACUITY_SCORE: 21
ADLS_ACUITY_SCORE: 16
ADLS_ACUITY_SCORE: 18
ADLS_ACUITY_SCORE: 18
ADLS_ACUITY_SCORE: 16
ADLS_ACUITY_SCORE: 18
ADLS_ACUITY_SCORE: 21
ADLS_ACUITY_SCORE: 18
ADLS_ACUITY_SCORE: 21
ADLS_ACUITY_SCORE: 18
ADLS_ACUITY_SCORE: 18
ADLS_ACUITY_SCORE: 21
ADLS_ACUITY_SCORE: 21
ADLS_ACUITY_SCORE: 18

## 2021-11-20 ASSESSMENT — MIFFLIN-ST. JEOR: SCORE: 1187.88

## 2021-11-20 NOTE — PROGRESS NOTES
Writer noticed patient on side of bed hypertensive, and noticed ETT group home out of mouth. Sedation had been off, mitts were still on, and patient was on pressure support awaiting extubation. Staff assist called, suctioned mouth and pulled out rest of ETT. Applied oxygen and suctioned mouth, providers at bedside. Tolerating 1-2L NC. No additional orders received.

## 2021-11-20 NOTE — ANESTHESIA POSTPROCEDURE EVALUATION
Patient: Berta Nava    Procedure: Procedure(s):  TRANSPLANT, LIVER, RECIPIENT,  DONOR  Bench liver       Diagnosis:End stage liver disease (H) [K72.10]  Diagnosis Additional Information: No value filed.    Anesthesia Type:  General    Note:  Disposition: ICU            ICU Sign Out: Anesthesiologist/ICU physician sign out WAS performed   Postop Pain Control: Uneventful            Sign Out: Well controlled pain   PONV: No   Neuro/Psych: Uneventful            Sign Out: Acceptable/Baseline neuro status   Airway/Respiratory: Uneventful            Sign Out: AIRWAY IN SITU/Resp. Support               Airway in situ/Resp. Support: ETT                 Reason: Planned Pre-op   CV/Hemodynamics: Uneventful            Sign Out: Acceptable CV status; No obvious hypovolemia; No obvious fluid overload   Other NRE:    DID A NON-ROUTINE EVENT OCCUR? No    Event details/Postop Comments:  Patient was taken to ICU intubated and on minimal pressors. Report given to the ICU           Last vitals:  Vitals Value Taken Time   /66 21 1545   Temp 36.2  C (97.2  F) 21 2000   Pulse 61 21   Resp 14 21 0800   SpO2 100 % 21   Vitals shown include unvalidated device data.    Electronically Signed By: Alfredo Justice DO  2021  8:31 PM

## 2021-11-20 NOTE — PROGRESS NOTES
Immunosuppression Note:    Berta Nava is a 56 year old female who is seen today  for immunosuppression management     I, Ernesto Schmitt MD, I have examined the patient with the resident/PA/Fellow, discussed and agree with the note and findings.  I have reviewed today's vital signs, medications, labs and imaging. I reviewed the immunosuppression medications and levels. I spoke to the patient/family and explained below clinical details and answered all the questions    Transplant Surgery  Inpatient Daily Progress Note  2021    Assessment & Plan: Berta Nava is a 56 year old female with PMH significant for PSC/UC, Sjogren's syndrome, HTN, liver transplant aborted in 2021 d/t V tach. S/p  donor liver transplant with biliary stent on 21, complicated by coagulopathy and finding of intra-abdominal abscess. Return to OR 21 for washout and abdominal closure.    Graft function: DDLTx 21 POD #2/1 from washout/closure. Transaminases improving, TB normalized, 0.9  - US: left hepatic artery and left portal vein not visualized due to limited sonographic windows. Repeat US post closure:normal, patent doppler .  Immunosuppression management: Induction w/ steroids.  -Tacro 2mg BID, titrate to a level goal 8-10. Level 8.7 from 5.9. in setting of fluconazole, will decrease to 1.5mg BID. Monitor daily  -MMF 750mg BID   -Steroid taper per protocol.  Complexity of management: High. Contributing factors:  induction.  Hematology:   ACD/ABL: Hgb 7.4, no transfusion since OR.  Thrombocytopenia, acute:   Plt 52, down from >300 on admission. Monitor. No transfusion  Coagulopathy: INR 1.6, fib 369. Monitor.  Cardiorespiratory:   Circulatory failure/ Hypotension:weaned off NE this am with removal of sedation  Post-op mechanical ventilation: Extubated this am. RA. Continue CDB/IS  GI/Nutrition:   Diet: NPO, Advance to CLD. NGT placed, advance to post pyloric.   Post op ileus: Continue bowel  regimen  Endocrine:   Steroid induced hyperglycemia: Hgb A1c 5.4, -170, Continue insulin gtt.  Fluid/Electrolytes: Hypervolemia : +5Kg overnight, remains up 3Kg from admit, Lasix gtt 10/hr +metolazone 5  GONZALO: Cr 1.6, up from ~0.9. Monitor. UOP, decreased to 10-15cc/hr, diurese today  : Jurado to remain due to strict I&O  Infectious disease: Afebrile,  Leukocytosis WBC17.5  (16.6)  Intra abdominal abscess: Noted in OR on 11/18. Gram stain + GPC. Culture Staph epi. On vanco. Continue zosyn x4 days post closure. Continue fluconazole.   Prophylaxis: DVT(mechanical), fall, GI (PPI), Fungal ppx (Diflucan) CMV ppx (gancyclovir), PCP ppx (bactrim)  Disposition: SICU    Medical Decision Making: High  Subsequent visit 65185 (high level decision making)    [place]/Fellow/Resident Provider: Nini Zhou NP     Faculty: Ernesto Schmitt M.D.    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    11/18/2021 (Liver), Postoperative day: 2     Interval History: Unable to obtain a complete history from the patient due to confusion    Overnight events: self extubated herself this am. Afebrile. CVP 14. Unaware received a transplant. C/o abdominal pain     ROS:   A 10-point review of systems was negative except as noted above.    Curent Meds:   aspirin  325 mg Oral or Feeding Tube Daily    fluconazole  400 mg Intravenous Q24H    magnesium oxide  400 mg Oral BID    [START ON 11/21/2021] methylPREDNISolone  50 mg Intravenous Once    Followed by    [START ON 11/22/2021] predniSONE  25 mg Oral Once    Followed by    [START ON 11/23/2021] predniSONE  10 mg Oral Once    multivitamins w/minerals  15 mL Per Feeding Tube Daily    mycophenolate  750 mg Oral BID IS    Or    mycophenolate  750 mg  "Oral or NG Tube BID IS    pantoprazole  40 mg Oral BID AC    piperacillin-tazobactam  3.375 g Intravenous Q6H    polyethylene glycol  17 g Oral Daily    protein modular  1 packet Per Feeding Tube Daily    senna-docusate  1 tablet Oral BID    Or    senna-docusate  2 tablet Oral BID    sodium chloride (PF)  3 mL Intravenous Q8H    sulfamethoxazole-trimethoprim  1 tablet Oral Daily    tacrolimus  2 mg Oral BID IS    Or    tacrolimus  2 mg Oral or NG Tube BID IS    valGANciclovir  450 mg Oral Daily    Or    valGANciclovir  450 mg Oral or NG Tube Daily    vancomycin  750 mg Intravenous Q12H       Physical Exam:     Admit Weight: 57.6 kg (127 lb)    Current Vitals:   /58   Pulse 75   Temp 99.3  F (37.4  C) (Pulmonary Artery)   Resp 14   Ht 1.651 m (5' 5\")   Wt 60.7 kg (133 lb 13.1 oz)   LMP  (LMP Unknown)   SpO2 100%   BMI 22.27 kg/m      CVP (mmHg): 5 mmHg    Vital sign ranges:    Temp:  [96.8  F (36  C)-99.3  F (37.4  C)] 99.3  F (37.4  C)  Pulse:  [55-80] 75  Resp:  [14] 14  BP: (115)/(58) 115/58  MAP:  [63 mmHg-214 mmHg] 72 mmHg  Arterial Line BP: ()/() 101/54  FiO2 (%):  [30 %-40 %] 30 %  SpO2:  [97 %-100 %] 100 %  Patient Vitals for the past 24 hrs:   BP Temp Temp src Pulse Resp SpO2 Weight   11/20/21 0915 -- -- -- 75 -- 100 % --   11/20/21 0900 -- -- -- 76 -- 99 % --   11/20/21 0845 -- -- -- 80 -- 97 % --   11/20/21 0830 -- -- -- 70 -- 99 % --   11/20/21 0815 -- -- -- 72 -- 99 % --   11/20/21 0800 -- 99.3  F (37.4  C) Pulm Art 69 14 99 % --   11/20/21 0755 115/58 -- -- -- -- -- --   11/20/21 0700 -- -- -- 71 14 99 % --   11/20/21 0645 -- -- -- 71 -- 99 % --   11/20/21 0630 -- -- -- 71 -- 99 % --   11/20/21 0615 -- -- -- 71 -- 99 % --   11/20/21 0600 -- -- -- 69 14 99 % 60.7 kg (133 lb 13.1 oz)   11/20/21 0545 -- -- -- 74 -- 100 % --   11/20/21 0530 -- -- -- 70 -- 99 % --   11/20/21 0515 -- -- -- 72 -- 99 % --   11/20/21 0500 -- -- -- 71 14 99 % --   11/20/21 0445 -- -- -- 75 -- 100 % -- "   11/20/21 0430 -- -- -- 68 -- 99 % --   11/20/21 0415 -- -- -- 68 -- 99 % --   11/20/21 0400 -- 97.4  F (36.3  C) Axillary 69 14 99 % --   11/20/21 0345 -- -- -- 68 -- 99 % --   11/20/21 0330 -- -- -- 68 -- 99 % --   11/20/21 0315 -- -- -- 66 -- 99 % --   11/20/21 0300 -- -- -- 67 14 99 % --   11/20/21 0245 -- -- -- 68 -- 99 % --   11/20/21 0230 -- -- -- 71 -- 99 % --   11/20/21 0215 -- -- -- 65 -- 99 % --   11/20/21 0200 -- -- -- 67 14 99 % --   11/20/21 0145 -- -- -- 72 -- 99 % --   11/20/21 0130 -- -- -- 66 -- 99 % --   11/20/21 0115 -- -- -- 67 -- 99 % --   11/20/21 0100 -- -- -- 68 -- 98 % --   11/20/21 0045 -- -- -- 72 -- 99 % --   11/20/21 0030 -- -- -- 65 -- 99 % --   11/20/21 0015 -- -- -- 65 -- 99 % --   11/20/21 0000 -- -- Axillary 64 14 99 % --   11/19/21 2300 -- -- -- 65 14 99 % --   11/19/21 2245 -- -- -- 63 -- 99 % --   11/19/21 2230 -- -- -- 63 -- 99 % --   11/19/21 2215 -- -- -- 62 -- 99 % --   11/19/21 2200 -- -- -- 63 14 99 % --   11/19/21 2145 -- -- -- 62 -- 99 % --   11/19/21 2130 -- -- -- 63 -- 99 % --   11/19/21 2115 -- -- -- 62 -- 99 % --   11/19/21 2100 -- -- -- 62 14 99 % --   11/19/21 2045 -- -- -- 62 -- 99 % --   11/19/21 2030 -- -- -- 61 -- 100 % --   11/19/21 2015 -- -- -- 61 -- 99 % --   11/19/21 2000 -- 97.2  F (36.2  C) Axillary 68 14 100 % --   11/19/21 1945 -- -- -- 60 -- 99 % --   11/19/21 1930 -- -- -- 60 -- 100 % --   11/19/21 1915 -- -- -- 60 -- 100 % --   11/19/21 1900 -- -- -- 61 -- 100 % --   11/19/21 1845 -- -- -- 61 -- 100 % --   11/19/21 1834 -- -- -- -- -- 100 % --   11/19/21 1815 -- -- -- 65 -- 100 % --   11/19/21 1800 -- -- -- 62 -- 100 % --   11/19/21 1745 -- -- -- 61 -- 100 % --   11/19/21 1730 -- -- -- 58 -- 100 % --   11/19/21 1715 -- -- -- 58 -- 100 % --   11/19/21 1700 -- -- -- 55 -- 100 % --   11/19/21 1645 -- 96.8  F (36  C) Pulm Art 57 -- 100 % --   11/19/21 1630 -- -- -- 58 -- 100 % --   11/19/21 1615 -- -- -- 55 -- 100 % --   11/19/21 1600 -- 96.8  F (36   C) -- 57 -- 100 % --   11/19/21 1545 -- -- -- 57 -- 100 % --   11/19/21 1530 -- -- -- 58 -- 100 % --   11/19/21 1515 -- -- -- 59 -- 100 % --   11/19/21 1500 -- -- -- 55 -- 100 % --   11/19/21 1445 -- -- -- 56 -- 100 % --   11/19/21 1430 -- -- -- 56 -- 100 % --   11/19/21 1415 -- -- -- 57 -- 99 % --   11/19/21 1130 -- -- -- 68 -- 100 % --   11/19/21 1115 -- -- -- 68 -- 99 % --   11/19/21 1100 -- -- -- 68 -- 99 % --   11/19/21 1045 -- -- -- 68 -- 99 % --   11/19/21 1030 -- -- -- 68 -- 99 % --   11/19/21 1015 -- -- -- 70 -- 99 % --     General Appearance: NAD  Skin: warm, dry, no jaundice. Scattered ecchymosis. See abd exam  Heart: regular rate and rhythm  Lungs: coarse anteriorly-cleared with cough,  NLB on 1L NC  Abdomen: soft, no guarding, incision with surgical dressing, CDI. ROMA x2, duct drain s/s, other drain with thin dark s/s output.   :Jurado present with small amount of clear fabiola UOP  Extremities: edema: BRIGHT +2 bilaterally  Neurologic: A0x2 to self and place. Citlalli independently.     Frailty Scores       Frailty Scores 3/23/2021 3/13/2021    Final Score Not Frail Not Frail    Final Score Number 0 0            Data:   CMP  Recent Labs   Lab 11/20/21  0806 11/20/21  0507 11/20/21  0144 11/20/21  0016 11/19/21  2305 11/19/21  1601 11/19/21  1459 11/19/21  1451 11/19/21  1341 11/19/21  0541 11/19/21  0533 11/18/21  1559 11/18/21  1446   NA  --  137  --   --   --   --  138  --  136   < > 140  --  143   POTASSIUM  --  3.7  --   --  3.4  --  3.5  --  3.7   < > 3.6   < > 3.2*   CHLORIDE  --  110*  --   --   --   --  111*  --   --   --  111*  --  110*   CO2  --  16*  --   --   --   --  20  --   --   --  22  --  26   * 176*  --    < >  --    < > 109*   < > 127*   < > 128*   < > 128*   BUN  --  27  --   --   --   --  24  --   --   --  21  --  14   CR  --  1.65*  --   --   --   --  1.16*  --   --   --  0.89  --  0.55   GFRESTIMATED  --  34*  --   --   --   --  52*  --   --   --  72  --  >90   MARIELENA  --  8.0*  --    --   --   --  8.1*  --   --   --  8.6  --  9.3   ICAW  --   --  4.1*  --   --   --   --   --  4.7   < > 4.9  --   --    MAG  --  1.8  --   --   --   --   --   --   --   --  1.8   < > 1.7   PHOS  --  5.3*  --   --   --   --   --   --   --   --  3.9   < > 2.7   AMYLASE  --   --   --   --   --   --   --   --   --   --  36  --  31   LIPASE  --   --   --   --   --   --   --   --   --   --  83  --  156   ALBUMIN  --  2.3*  --   --   --   --  2.4*  --   --   --  2.5*  --  1.6*  1.6*   BILITOTAL  --  0.9  --   --   --   --  1.2  --   --   --  1.3  --  3.1*  3.1*   ALKPHOS  --  33*  --   --   --   --  34*  --   --   --  36*  --  41  41   AST  --  36  --   --   --   --  69*  --   --   --  98*  --  280*  280*   ALT  --  92*  --   --   --   --  117*  --   --   --  140*  --  200*  200*    < > = values in this interval not displayed.     CBC  Recent Labs   Lab 11/20/21  0507 11/19/21  1942 11/19/21  1005 11/19/21  0533 11/19/21  0127 11/19/21  0126   HGB 7.4* 8.0*   < > 8.4*   < >  --    WBC 17.5*  --   --  16.6*   < >  --    PLT 52*  --   --  61*   < >  --    A1C  --   --   --   --   --  5.4    < > = values in this interval not displayed.     COAGS  Recent Labs   Lab 11/20/21  0508 11/20/21  0146 11/19/21  1005 11/19/21  0533   INR  --  1.57*  --  1.63*   PTT 33 33   < > 33    < > = values in this interval not displayed.      Urinalysis  Recent Labs   Lab Test 11/06/21  0509 10/25/21  1339 06/10/21  0632 05/21/21  0730   COLOR Dark Yellow* Yellow   < > Dark Yellow   APPEARANCE Clear Clear   < > Clear   URINEGLC Negative Negative   < > Negative   URINEBILI Moderate* Small*   < > Negative   URINEKETONE Negative Negative   < > Negative   SG 1.018 1.017   < > 1.031   UBLD Negative Negative   < > Negative   URINEPH 5.5 5.5   < > 6.0   PROTEIN Negative Negative   < > 20*   NITRITE Negative Negative   < > Negative   LEUKEST Negative Negative   < > Negative   RBCU <1 <1   < > 1   WBCU 1 1   < > 4   UTPG  --   --   --  0.26*    < >  = values in this interval not displayed.     Virology:  Hepatitis C Antibody   Date Value Ref Range Status   11/17/2021 Nonreactive Nonreactive Final

## 2021-11-20 NOTE — PLAN OF CARE
Problem: Adjustment to Transplant (Liver Transplant)  Goal: Optimal Coping with Organ Transplant  Outcome: No Change     Major Shift Events:      UOP following liver transplant diminished; 500cc bolus of albumin given and pressers titrated to improve urine output. Plan to wean sedation and pressers for likely extubation during the day shift.     For vital signs and complete assessments, please see documentation flowsheets.

## 2021-11-20 NOTE — PHARMACY-VANCOMYCIN DOSING SERVICE
Pharmacy Empiric Dose Change Per Policy    Original Dose Ordered: 750mg IV vancomycin q12h  Dose Changed To: intermittent dosing of vancomycin in the setting of GONZALO - will spot check level 11/21/21 AM and evaluate timing of subsequent dose.       This dose change was based on the pharmacist's assessment of this patient's age, weight, concurrent drug therapy, treatment goals, whether patient's creatinine clearance adequately indicates renal function (factoring in age, muscle mass, fluid and clinical status), and, if applicable, prior pharmacokinetic data.    Creatinine Clearance=     Estimated Creatinine Clearance: 35.6 mL/min (A) (based on SCr of 1.65 mg/dL (H)).  Will continue to follow and modify dosage according to levels, organ function and clinical condition      Radha Baca, PharmD, BCCCP

## 2021-11-20 NOTE — PROGRESS NOTES
ICU PROGRESS NOTE  11/20/2021    CO-MORBIDITIES:   Liver transplant planned  (primary encounter diagnosis)  Encephalopathy  Hepatic encephalopathy (H)  Chronic liver failure without hepatic coma (H)    ASSESSMENT: Berta Nava is a 56 year old female who presented on 11/17/2021 with PMH of primary sclerosing cholangitis s/p ERCP, cirrhosis c/b ascites who presents with HE/AMS for potential liver transplant. The patient was scheduled to get liver txp one month ago, however, upon induction of anesthesia, the patient went into unstable Vtach and the case was aborted.  In the OR, EBL was 10 L; she received 22 U of blood, 3 of platelets, 13 FFP, 2 cryo, crystalloid 8cc  and 2400cc of cellsaver. Her abdomen was temporally closed with VAC and came to the floor with propofol gtt and intubated. She will RTOR on 11/20/21.    MELD-Na score: 25 at 11/17/2021  2:01 PM  MELD score: 16 at 11/17/2021  2:01 PM  Calculated from:  Serum Creatinine: 0.92 mg/dL (Using min of 1 mg/dL) at 11/17/2021  2:01 PM  Serum Sodium: 126 mmol/L at 11/17/2021  2:01 PM  Total Bilirubin: 6.6 mg/dL at 11/17/2021  2:01 PM  INR(ratio): 1.24 at 11/17/2021  2:01 PM  Age: 56 years    PLAN:  Lasix gtt at 10 ml/hr  Metolazone 5 mg  Swallow test  remove PAC and 1 MAC line, replace 1 MAC with CVC      Neuro/ pain/ sedation:  # Postoperative pain management  - oxy and dilaudid  # Hepatic encephalopathy / AMS  - Hold PTA lactulose    Pulmonary care:   # postoperative respiratory support  - Supplemental oxygen to keep saturation above 92 %.  - Wean FiO2 as tolerated    Cardiovascular:    # Post op hemodynamic management  # Hx of Vtach  - Monitor hemodynamic status.   - CVP goal 8< CVP<12  - Maintain MAP > 65  - Pressor: off   - Monitor hemodynamic status    GI care:   # Primary sclerosing cholangitis c/b strictures s/p ERCP  # S/P DDLT   - NPO except medications.  - OG in place  - pantoprazole 40 mg every day   - nutrition consult for NJT placement (NGT in  place)  - Hold PTA omeprazole  - Hold ursodiol   - Hold sucralfate    Renal/Fluids/ Electrolytes/ Nutrition:   # hx of GONZALO  - D5 1/2 NS at 100ml/hr  for IV fluid hydration  - decrease IVF when taking PO  - 5 mg metolazone   - furosemide gtt at 10 ml/hr  - Hold PTA lasix and Spironolactone  - ICU electrolyte replacement protocol  - Urine output 1190//200  - Will continue to monitor intake and output.     Endocrine:    - insulin gtt for BS goal <180    ID/ Antibiotics:  # suprarenal abscess  # ppx abx  - intraoperative cx from abscess grew GPC, start vanc  - Zosyn 48hr  - Trimethoprim-sulfamethoxazole for PCP prophylaxis  - Ganciclovir for CMV ppx   - micafungin or fluconazole, will clarify with txp    # Anemia due to blood loss  # Coagulopathy  - Hemoglobin 7.4  - INR 1.63, Platelets 60, Fibrinogen 297; no need for blood products at this time  - change labs to daily  - Monitor hgb; transfuse if Hgb < 7  - Monitor fibrinogen; fibrinogen goal >200  - Monitor platelet; replace if platelet < 50,000  - INR goal <2  - q12h CBC, q4h lactate, q4h BMP      Prophylaxis:    -Mechanical prophylaxis for DVT.   -No chemical DVT prophylaxis due to high risk of bleeding.   -GI ppx: Protonic BID      MSK:    - PT and OT consulted. Appreciate recs.      Lines/ tubes/ drains:  - ETT, OGT, Jurado, OG, 2 MAC, PAC (remove PAC and 1 MAC line, replace 1 MAC with CVC)      Disposition:  -Surgical ICU. General Cares:   - Code status: full     Patient seen, findings and plan discussed with ICU staff, Dr. Harrison Cruz MD  Anesthesiology, CA-1    ====================================    TODAY'S PROGRESS:   SUBJECTIVE:   Abdomen closed 11/19/21.   OBJECTIVE:   1. VITAL SIGNS:   Temp:  [96.8  F (36  C)-99.3  F (37.4  C)] 99.3  F (37.4  C)  Pulse:  [55-80] 68  Resp:  [14] 14  BP: (115)/(58) 115/58  MAP:  [63 mmHg-214 mmHg] 77 mmHg  Arterial Line BP: ()/() 108/54  FiO2 (%):  [30 %-40 %] 30 %  SpO2:  [97 %-100 %] 99  %  Ventilation Mode: (S) CPAP/PS  (Continuous positive airway pressure with Pressure Support)  FiO2 (%): 30 %  Rate Set (breaths/minute): 14 breaths/min  Tidal Volume Set (mL): 450 mL  PEEP (cm H2O): 5 cmH2O  Pressure Support (cm H2O): 7 cmH2O  Oxygen Concentration (%): 30 %  Resp: 14      2. INTAKE/ OUTPUT:   I/O last 3 completed shifts:  In: 4072.54 [I.V.:2452.54; NG/GT:150]  Out: 1527 [Urine:682; Emesis/NG output:75; Drains:720; Blood:50]    3. PHYSICAL EXAMINATION:     General: comfortable in bed  Neuro: AAOx4  Resp: CTABL  CV: RRR, S1S2 WNL  Abdomen: distended, soft    Extremities: warm and well perfused. 2+ edema  Skin: no rashes or jaundice    4. INVESTIGATIONS:  All labs and imaging studies personally reviewed     Arterial Blood Gases   Recent Labs   Lab 11/20/21  0508 11/20/21  0144 11/19/21  1941 11/19/21  1458   PH 7.48* 7.45 7.42 7.43   PCO2 25* 25* 30* 30*   PO2 172* 177* 158* 125*   HCO3 18* 17* 20* 20*     Complete Blood Count   Recent Labs   Lab 11/20/21  0507 11/19/21  1942 11/19/21  1341 11/19/21  1221 11/19/21  1005 11/19/21  0533 11/19/21  0127 11/18/21  2334 11/18/21  2156   WBC 17.5*  --   --   --   --  16.6* 16.6*  --  20.7*   HGB 7.4* 8.0* 8.6* 7.6*   < > 8.4* 9.4*   < > 10.3*   PLT 52*  --   --   --   --  61* 78*  --  95*    < > = values in this interval not displayed.     Basic Metabolic Panel  Recent Labs   Lab 11/20/21  1315 11/20/21  1043 11/20/21  0806 11/20/21  0507 11/20/21  0016 11/19/21  2305 11/19/21  1601 11/19/21  1459 11/19/21  1451 11/19/21  1341 11/19/21  1221 11/19/21  0541 11/19/21  0533 11/18/21  1559 11/18/21  1446   NA  --   --   --  137  --   --   --  138  --  136 137  --  140  --  143   POTASSIUM  --   --   --  3.7  --  3.4  --  3.5  --  3.7 3.7  --  3.6   < > 3.2*   CHLORIDE  --   --   --  110*  --   --   --  111*  --   --   --   --  111*  --  110*   CO2  --   --   --  16*  --   --   --  20  --   --   --   --  22  --  26   BUN  --   --   --  27  --   --   --  24  --    --   --   --  21  --  14   CR  --   --   --  1.65*  --   --   --  1.16*  --   --   --   --  0.89  --  0.55   * 93 154* 176*   < >  --    < > 109*   < > 127* 117*   < > 128*   < > 128*    < > = values in this interval not displayed.     Liver Function Tests  Recent Labs   Lab 11/20/21  0507 11/20/21  0146 11/19/21  1459 11/19/21  0533 11/19/21  0126 11/18/21  2156 11/18/21  1446   AST 36  --  69* 98*  --   --  280*  280*   ALT 92*  --  117* 140*  --   --  200*  200*   ALKPHOS 33*  --  34* 36*  --   --  41  41   BILITOTAL 0.9  --  1.2 1.3  --   --  3.1*  3.1*   ALBUMIN 2.3*  --  2.4* 2.5*  --   --  1.6*  1.6*   INR  --  1.57*  --  1.63* 1.63* 1.76* 1.97*     Pancreatic Enzymes  Recent Labs   Lab 11/19/21  0533 11/18/21  1446 11/17/21  1401   LIPASE 83 156  --    AMYLASE 36 31 81     Coagulation Profile  Recent Labs   Lab 11/20/21  0508 11/20/21  0146 11/19/21  2305 11/19/21  1942 11/19/21  1005 11/19/21  0533 11/19/21  0126 11/18/21  2156   INR  --  1.57*  --   --   --  1.63* 1.63* 1.76*   PTT 33 33 33 34   < > 33 34 33    < > = values in this interval not displayed.         5. RADIOLOGY:   Recent Results (from the past 24 hour(s))   XR Abdomen Port 1 View    Narrative    Exam: XR ABDOMEN PORT 1 VIEWS, 11/19/2021 4:49 PM    Indication: Evaluate FT    Comparison: Same day    Findings:   Enteric tube distal tip projects over the distal stomach. The feeding  tube is slightly further near the level of the pylorus/proximal  duodenum. Nonobstructive bowel gas pattern. No portal venous gas.  Right upper quadrant drains and biliary stent in place. [place]  catheter tip projects over the main pulmonary artery.      Impression    Impression: Enteric tube distal tip projects over the distal stomach.  Feeding tube is slightly further near the level of the  pylorus/proximal duodenum with redundant tubing in the gastric  fundus/body.    I have personally reviewed the examination and initial interpretation  and I agree  with the findings.    FELIPAMARTINEZ CHEUNG DO         SYSTEM ID:  [id number]   US Liver Transplant Follow Up Portable    Narrative    EXAMINATION: US LIVER TRANSPLANT FOLLOW UP PORTABLE, 11/19/2021 6:34  PM     COMPARISON: 11/18/2021    HISTORY: Liver transplant status post closure    TECHNIQUE:  Gray-scale, color Doppler and spectral flow analysis.    FINDINGS:     Liver:   The liver demonstrates normal homogeneous echotexture. No  evidence of a focal hepatic mass.     Bile Ducts: No intrahepatic biliary dilation. Common bile duct not  seen.    Gallbladder: The gallbladder is surgically absent.    Kidneys:   Right kidney:  The right kidney demonstrates normal echotexture with  no evidence of a shadowing stone, focal mass or hydronephrosis.   11.1  cm in long axis dimension.    Pancreas: Visualized portions of the pancreas are normal in  appearance.    Visualized portions of the aorta are unremarkable.    LIVER DOPPLER:  Splenic vein:  Patent continuous normal antegrade direction flow  towards the liver, 34 cm/sec.  Extrahepatic portal vein:  Patent continuous antegrade flow, 80  cm/sec.  Portal vein at anastomosis: Patent continuous antegrade flow, 167  cm/sec.  Intrahepatic portal vein:  Patent continuous antegrade flow, 160  cm/sec.  Right portal vein flow is antegrade, measuring 95 cm/sec.  Left portal vein flow is antegrade, measuring 98 cm/sec.    Inferior vena cava: patent with flow toward the heart throughout..  IVC above anastomosis:  79 cm/sec.  IVC at anastomosis:  56 cm/sec.  Intrahepatic IVC:  66 cm/sec.  Extrahepatic IVC:  67 cm/sec.    Right, mid, left hepatic veins: Patent with flow towards the inferior  vena cava.    Extrahepatic hepatic artery: Low resistance waveform with flow towards  the liver. 50 cm/sec with resistive index 0.84. Previously 134 cm/s.  Right hepatic artery: 29 cm/sec with resistive index 0.72. Previously  46.1  Left hepatic artery: 40 cm/sec with resistive index 0.78. Not  previously  seen.      Impression    Impression:   1. Normal grayscale appearance of the hepatic transplant.  2. Patent Doppler evaluation. Velocities of the hepatic artery and  branches are slower than prior exam which is nonspecific. All flow  remains antegrade throughout the arterial and portal system.    I have personally reviewed the examination and initial interpretation  and I agree with the findings.    RONN CHEUNG DO         SYSTEM ID:  [id number]       =========================================

## 2021-11-20 NOTE — PHARMACY-TRANSPLANT NOTE
Adult Liver Transplant Post Operative Note    56 year old female  s/p  donor liver transplant on 21 for Primary Sclerosing Cholangitis.      Planned immunosuppression regimen to include:   INDUCTION with: methylprednisolone/prednisone taper through POD #5.  MAINTENANCE to include mycophenolate and tacrolimus with initial goal trough levels of 8-12 (closer to 10) mcg/L for 0-3 months post-transplant.  Patient may continue prednisone at 5 mg PO daily until tacrolimus level is therapeutic.     Surgical prophylaxis includes: piperacillin-tazobactam IV for 48 hours and fluconazole IV/PO for 7 days along with vancomycin for adrenal abscess observed in OR.    Opportunistic pathogen prophylaxis includes: trimethoprim/sulfamethoxazole, valganciclovir, and nystatin (topical antifungal coverage to begin once systemic antifungal therapy is complete).    Patient is not enrolled in medication study.    Pharmacy will monitor for medication interactions and immunosuppression levels in conjunction with the team. Medication therapy needs for discharge planning will continue to be addressed throughout the current admission via multidisciplinary rounds and order review.  Pharmacy will make recommendations as appropriate.    Radha Baca, PharmD, BCCCP

## 2021-11-21 ENCOUNTER — APPOINTMENT (OUTPATIENT)
Dept: OCCUPATIONAL THERAPY | Facility: CLINIC | Age: 56
DRG: 005 | End: 2021-11-21
Attending: SURGERY
Payer: COMMERCIAL

## 2021-11-21 LAB
ALBUMIN SERPL-MCNC: 2 G/DL (ref 3.4–5)
ALBUMIN SERPL-MCNC: 2 G/DL (ref 3.4–5)
ALP SERPL-CCNC: 37 U/L (ref 40–150)
ALP SERPL-CCNC: 48 U/L (ref 40–150)
ALT SERPL W P-5'-P-CCNC: 73 U/L (ref 0–50)
ALT SERPL W P-5'-P-CCNC: 79 U/L (ref 0–50)
ANION GAP SERPL CALCULATED.3IONS-SCNC: 8 MMOL/L (ref 3–14)
ANION GAP SERPL CALCULATED.3IONS-SCNC: 8 MMOL/L (ref 3–14)
APTT PPP: 31 SECONDS (ref 22–38)
AST SERPL W P-5'-P-CCNC: 16 U/L (ref 0–45)
AST SERPL W P-5'-P-CCNC: 22 U/L (ref 0–45)
BACTERIA SPEC CULT: ABNORMAL
BASOPHILS # BLD AUTO: 0 10E3/UL (ref 0–0.2)
BASOPHILS NFR BLD AUTO: 0 %
BILIRUB DIRECT SERPL-MCNC: 0.6 MG/DL (ref 0–0.2)
BILIRUB DIRECT SERPL-MCNC: 0.7 MG/DL (ref 0–0.2)
BILIRUB SERPL-MCNC: 0.8 MG/DL (ref 0.2–1.3)
BILIRUB SERPL-MCNC: 0.9 MG/DL (ref 0.2–1.3)
BLD PROD TYP BPU: NORMAL
BLOOD COMPONENT TYPE: NORMAL
BUN SERPL-MCNC: 39 MG/DL (ref 7–30)
BUN SERPL-MCNC: 43 MG/DL (ref 7–30)
CA-I BLD-MCNC: 4.6 MG/DL (ref 4.4–5.2)
CALCIUM SERPL-MCNC: 7.5 MG/DL (ref 8.5–10.1)
CALCIUM SERPL-MCNC: 7.6 MG/DL (ref 8.5–10.1)
CHLORIDE BLD-SCNC: 103 MMOL/L (ref 94–109)
CHLORIDE BLD-SCNC: 105 MMOL/L (ref 94–109)
CO2 SERPL-SCNC: 19 MMOL/L (ref 20–32)
CO2 SERPL-SCNC: 21 MMOL/L (ref 20–32)
CODING SYSTEM: NORMAL
CREAT SERPL-MCNC: 1.64 MG/DL (ref 0.52–1.04)
CREAT SERPL-MCNC: 1.7 MG/DL (ref 0.52–1.04)
CROSSMATCH: NORMAL
EOSINOPHIL # BLD AUTO: 0 10E3/UL (ref 0–0.7)
EOSINOPHIL NFR BLD AUTO: 0 %
ERYTHROCYTE [DISTWIDTH] IN BLOOD BY AUTOMATED COUNT: 17.2 % (ref 10–15)
ERYTHROCYTE [DISTWIDTH] IN BLOOD BY AUTOMATED COUNT: 17.3 % (ref 10–15)
FIBRINOGEN PPP-MCNC: 407 MG/DL (ref 170–490)
GFR SERPL CREATININE-BSD FRML MDRD: 33 ML/MIN/1.73M2
GFR SERPL CREATININE-BSD FRML MDRD: 34 ML/MIN/1.73M2
GLUCOSE BLD-MCNC: 135 MG/DL (ref 70–99)
GLUCOSE BLD-MCNC: 151 MG/DL (ref 70–99)
GLUCOSE BLDC GLUCOMTR-MCNC: 111 MG/DL (ref 70–99)
GLUCOSE BLDC GLUCOMTR-MCNC: 117 MG/DL (ref 70–99)
GLUCOSE BLDC GLUCOMTR-MCNC: 124 MG/DL (ref 70–99)
GLUCOSE BLDC GLUCOMTR-MCNC: 126 MG/DL (ref 70–99)
GLUCOSE BLDC GLUCOMTR-MCNC: 138 MG/DL (ref 70–99)
GLUCOSE BLDC GLUCOMTR-MCNC: 150 MG/DL (ref 70–99)
HCT VFR BLD AUTO: 19.8 % (ref 35–47)
HCT VFR BLD AUTO: 25.7 % (ref 35–47)
HGB BLD-MCNC: 6.8 G/DL (ref 11.7–15.7)
HGB BLD-MCNC: 8.7 G/DL (ref 11.7–15.7)
IMM GRANULOCYTES # BLD: 0.1 10E3/UL
IMM GRANULOCYTES NFR BLD: 1 %
INR PPP: 1.34 (ref 0.85–1.15)
ISSUE DATE AND TIME: NORMAL
LACTATE SERPL-SCNC: 1 MMOL/L (ref 0.7–2)
LYMPHOCYTES # BLD AUTO: 0.4 10E3/UL (ref 0.8–5.3)
LYMPHOCYTES NFR BLD AUTO: 3 %
MAGNESIUM SERPL-MCNC: 1.9 MG/DL (ref 1.6–2.3)
MCH RBC QN AUTO: 29 PG (ref 26.5–33)
MCH RBC QN AUTO: 29.8 PG (ref 26.5–33)
MCHC RBC AUTO-ENTMCNC: 33.9 G/DL (ref 31.5–36.5)
MCHC RBC AUTO-ENTMCNC: 34.3 G/DL (ref 31.5–36.5)
MCV RBC AUTO: 86 FL (ref 78–100)
MCV RBC AUTO: 87 FL (ref 78–100)
MONOCYTES # BLD AUTO: 0.5 10E3/UL (ref 0–1.3)
MONOCYTES NFR BLD AUTO: 3 %
NEUTROPHILS # BLD AUTO: 13.3 10E3/UL (ref 1.6–8.3)
NEUTROPHILS NFR BLD AUTO: 93 %
NRBC # BLD AUTO: 0 10E3/UL
NRBC BLD AUTO-RTO: 0 /100
PHOSPHATE SERPL-MCNC: 4.8 MG/DL (ref 2.5–4.5)
PLATELET # BLD AUTO: 39 10E3/UL (ref 150–450)
PLATELET # BLD AUTO: 61 10E3/UL (ref 150–450)
POTASSIUM BLD-SCNC: 2.8 MMOL/L (ref 3.4–5.3)
POTASSIUM BLD-SCNC: 3.2 MMOL/L (ref 3.4–5.3)
PROT SERPL-MCNC: 4.3 G/DL (ref 6.8–8.8)
PROT SERPL-MCNC: 4.7 G/DL (ref 6.8–8.8)
RBC # BLD AUTO: 2.28 10E6/UL (ref 3.8–5.2)
RBC # BLD AUTO: 3 10E6/UL (ref 3.8–5.2)
SODIUM SERPL-SCNC: 130 MMOL/L (ref 133–144)
SODIUM SERPL-SCNC: 134 MMOL/L (ref 133–144)
TACROLIMUS BLD-MCNC: 6.9 UG/L (ref 5–15)
TME LAST DOSE: NORMAL H
TME LAST DOSE: NORMAL H
UNIT ABO/RH: NORMAL
UNIT NUMBER: NORMAL
UNIT STATUS: NORMAL
UNIT TYPE ISBT: 2800
VANCOMYCIN SERPL-MCNC: 21.9 MG/L
WBC # BLD AUTO: 14.3 10E3/UL (ref 4–11)
WBC # BLD AUTO: 16.9 10E3/UL (ref 4–11)

## 2021-11-21 PROCEDURE — 97530 THERAPEUTIC ACTIVITIES: CPT | Mod: GO

## 2021-11-21 PROCEDURE — 82310 ASSAY OF CALCIUM: CPT | Performed by: TRANSPLANT SURGERY

## 2021-11-21 PROCEDURE — P9016 RBC LEUKOCYTES REDUCED: HCPCS | Performed by: STUDENT IN AN ORGANIZED HEALTH CARE EDUCATION/TRAINING PROGRAM

## 2021-11-21 PROCEDURE — 250N000011 HC RX IP 250 OP 636: Performed by: SURGERY

## 2021-11-21 PROCEDURE — 85730 THROMBOPLASTIN TIME PARTIAL: CPT | Performed by: PHYSICIAN ASSISTANT

## 2021-11-21 PROCEDURE — 86923 COMPATIBILITY TEST ELECTRIC: CPT | Performed by: STUDENT IN AN ORGANIZED HEALTH CARE EDUCATION/TRAINING PROGRAM

## 2021-11-21 PROCEDURE — 250N000013 HC RX MED GY IP 250 OP 250 PS 637: Performed by: SURGERY

## 2021-11-21 PROCEDURE — 250N000013 HC RX MED GY IP 250 OP 250 PS 637: Performed by: TRANSPLANT SURGERY

## 2021-11-21 PROCEDURE — 97535 SELF CARE MNGMENT TRAINING: CPT | Mod: GO

## 2021-11-21 PROCEDURE — 83605 ASSAY OF LACTIC ACID: CPT | Performed by: PHYSICIAN ASSISTANT

## 2021-11-21 PROCEDURE — 250N000012 HC RX MED GY IP 250 OP 636 PS 637: Performed by: SURGERY

## 2021-11-21 PROCEDURE — 85041 AUTOMATED RBC COUNT: CPT | Performed by: NURSE PRACTITIONER

## 2021-11-21 PROCEDURE — 84155 ASSAY OF PROTEIN SERUM: CPT | Performed by: SURGERY

## 2021-11-21 PROCEDURE — 99207 PR SATISFY VISIT NUMBER: CPT | Performed by: TRANSPLANT SURGERY

## 2021-11-21 PROCEDURE — 85384 FIBRINOGEN ACTIVITY: CPT | Performed by: PHYSICIAN ASSISTANT

## 2021-11-21 PROCEDURE — 83735 ASSAY OF MAGNESIUM: CPT | Performed by: SURGERY

## 2021-11-21 PROCEDURE — 250N000013 HC RX MED GY IP 250 OP 250 PS 637: Performed by: STUDENT IN AN ORGANIZED HEALTH CARE EDUCATION/TRAINING PROGRAM

## 2021-11-21 PROCEDURE — 85610 PROTHROMBIN TIME: CPT | Performed by: SURGERY

## 2021-11-21 PROCEDURE — 80202 ASSAY OF VANCOMYCIN: CPT | Performed by: TRANSPLANT SURGERY

## 2021-11-21 PROCEDURE — 85025 COMPLETE CBC W/AUTO DIFF WBC: CPT | Performed by: SURGERY

## 2021-11-21 PROCEDURE — 200N000002 HC R&B ICU UMMC

## 2021-11-21 PROCEDURE — 82330 ASSAY OF CALCIUM: CPT | Performed by: SURGERY

## 2021-11-21 PROCEDURE — 250N000009 HC RX 250: Performed by: SURGERY

## 2021-11-21 PROCEDURE — 250N000011 HC RX IP 250 OP 636: Performed by: TRANSPLANT SURGERY

## 2021-11-21 PROCEDURE — 97166 OT EVAL MOD COMPLEX 45 MIN: CPT | Mod: GO

## 2021-11-21 PROCEDURE — 36592 COLLECT BLOOD FROM PICC: CPT | Performed by: SURGERY

## 2021-11-21 PROCEDURE — 84450 TRANSFERASE (AST) (SGOT): CPT | Performed by: SURGERY

## 2021-11-21 PROCEDURE — 250N000012 HC RX MED GY IP 250 OP 636 PS 637: Performed by: NURSE PRACTITIONER

## 2021-11-21 PROCEDURE — 258N000001 HC RX 258: Performed by: SURGERY

## 2021-11-21 PROCEDURE — 258N000003 HC RX IP 258 OP 636: Performed by: TRANSPLANT SURGERY

## 2021-11-21 PROCEDURE — 84100 ASSAY OF PHOSPHORUS: CPT | Performed by: SURGERY

## 2021-11-21 PROCEDURE — 99233 SBSQ HOSP IP/OBS HIGH 50: CPT | Mod: 24 | Performed by: PHYSICIAN ASSISTANT

## 2021-11-21 PROCEDURE — 80197 ASSAY OF TACROLIMUS: CPT | Performed by: SURGERY

## 2021-11-21 PROCEDURE — 250N000013 HC RX MED GY IP 250 OP 250 PS 637

## 2021-11-21 RX ORDER — MAGNESIUM OXIDE 400 MG/1
400 TABLET ORAL 2 TIMES DAILY
Status: COMPLETED | OUTPATIENT
Start: 2021-11-21 | End: 2021-11-22

## 2021-11-21 RX ORDER — POTASSIUM CHLORIDE 7.45 MG/ML
10 INJECTION INTRAVENOUS
Status: COMPLETED | OUTPATIENT
Start: 2021-11-21 | End: 2021-11-21

## 2021-11-21 RX ADMIN — FLUCONAZOLE IN SODIUM CHLORIDE 200 MG: 2 INJECTION, SOLUTION INTRAVENOUS at 08:24

## 2021-11-21 RX ADMIN — HUMAN INSULIN 6 UNITS/HR: 100 INJECTION, SOLUTION SUBCUTANEOUS at 03:36

## 2021-11-21 RX ADMIN — Medication 40 MG: at 08:29

## 2021-11-21 RX ADMIN — VANCOMYCIN HYDROCHLORIDE 750 MG: 1 INJECTION, POWDER, LYOPHILIZED, FOR SOLUTION INTRAVENOUS at 15:49

## 2021-11-21 RX ADMIN — POTASSIUM CHLORIDE 10 MEQ: 7.46 INJECTION, SOLUTION INTRAVENOUS at 11:54

## 2021-11-21 RX ADMIN — TACROLIMUS 1.5 MG: 1 CAPSULE ORAL at 17:29

## 2021-11-21 RX ADMIN — METHYLPREDNISOLONE SODIUM SUCCINATE 50 MG: 125 INJECTION, POWDER, FOR SOLUTION INTRAMUSCULAR; INTRAVENOUS at 08:23

## 2021-11-21 RX ADMIN — SULFAMETHOXAZOLE AND TRIMETHOPRIM 1 TABLET: 400; 80 TABLET ORAL at 08:23

## 2021-11-21 RX ADMIN — HUMAN INSULIN 4 UNITS/HR: 100 INJECTION, SOLUTION SUBCUTANEOUS at 13:05

## 2021-11-21 RX ADMIN — Medication 40 MG: at 17:29

## 2021-11-21 RX ADMIN — HUMAN INSULIN 6 UNITS/HR: 100 INJECTION, SOLUTION SUBCUTANEOUS at 23:53

## 2021-11-21 RX ADMIN — Medication 1 PACKET: at 08:33

## 2021-11-21 RX ADMIN — DOCUSATE SODIUM 50 MG AND SENNOSIDES 8.6 MG 1 TABLET: 8.6; 5 TABLET, FILM COATED ORAL at 19:21

## 2021-11-21 RX ADMIN — MYCOPHENOLATE MOFETIL 750 MG: 250 CAPSULE ORAL at 08:24

## 2021-11-21 RX ADMIN — MYCOPHENOLATE MOFETIL 750 MG: 250 CAPSULE ORAL at 17:29

## 2021-11-21 RX ADMIN — OXYCODONE HYDROCHLORIDE 5 MG: 5 TABLET ORAL at 00:37

## 2021-11-21 RX ADMIN — DOCUSATE SODIUM 50 MG AND SENNOSIDES 8.6 MG 2 TABLET: 8.6; 5 TABLET, FILM COATED ORAL at 08:23

## 2021-11-21 RX ADMIN — OXYCODONE HYDROCHLORIDE 5 MG: 5 TABLET ORAL at 08:23

## 2021-11-21 RX ADMIN — POTASSIUM CHLORIDE 10 MEQ: 7.46 INJECTION, SOLUTION INTRAVENOUS at 13:01

## 2021-11-21 RX ADMIN — POTASSIUM CHLORIDE 10 MEQ: 7.46 INJECTION, SOLUTION INTRAVENOUS at 14:25

## 2021-11-21 RX ADMIN — Medication 400 MG: at 19:21

## 2021-11-21 RX ADMIN — POTASSIUM CHLORIDE 10 MEQ: 7.46 INJECTION, SOLUTION INTRAVENOUS at 09:57

## 2021-11-21 RX ADMIN — POTASSIUM CHLORIDE 10 MEQ: 7.46 INJECTION, SOLUTION INTRAVENOUS at 19:21

## 2021-11-21 RX ADMIN — Medication 15 ML: at 08:24

## 2021-11-21 RX ADMIN — TACROLIMUS 1.5 MG: 1 CAPSULE ORAL at 08:24

## 2021-11-21 RX ADMIN — Medication 400 MG: at 08:24

## 2021-11-21 RX ADMIN — DEXTROSE AND SODIUM CHLORIDE: 5; 450 INJECTION, SOLUTION INTRAVENOUS at 05:58

## 2021-11-21 RX ADMIN — POTASSIUM CHLORIDE 10 MEQ: 7.46 INJECTION, SOLUTION INTRAVENOUS at 18:27

## 2021-11-21 RX ADMIN — DEXTROSE AND SODIUM CHLORIDE: 5; 450 INJECTION, SOLUTION INTRAVENOUS at 03:37

## 2021-11-21 RX ADMIN — ASPIRIN 325 MG ORAL TABLET 325 MG: 325 PILL ORAL at 08:24

## 2021-11-21 RX ADMIN — OXYCODONE HYDROCHLORIDE 5 MG: 5 TABLET ORAL at 18:23

## 2021-11-21 ASSESSMENT — ACTIVITIES OF DAILY LIVING (ADL)
ADLS_ACUITY_SCORE: 15
ADLS_ACUITY_SCORE: 15
ADLS_ACUITY_SCORE: 16
ADLS_ACUITY_SCORE: 15
ADLS_ACUITY_SCORE: 13
ADLS_ACUITY_SCORE: 15
ADLS_ACUITY_SCORE: 15
ADLS_ACUITY_SCORE: 16
ADLS_ACUITY_SCORE: 13
ADLS_ACUITY_SCORE: 16
ADLS_ACUITY_SCORE: 15
ADLS_ACUITY_SCORE: 15
ADLS_ACUITY_SCORE: 16
ADLS_ACUITY_SCORE: 15
PREVIOUS_RESPONSIBILITIES: MEAL PREP;HOUSEKEEPING
ADLS_ACUITY_SCORE: 15
ADLS_ACUITY_SCORE: 16

## 2021-11-21 NOTE — PHARMACY-VANCOMYCIN DOSING SERVICE
"Pharmacy Vancomycin Note  Date of Service 2021  Patient's  1965   56 year old, female    Indication: Abscess  Day of Therapy: 4  Current vancomycin regimen:  Intermittent dosing due to GONZALO  Current vancomycin monitoring method: AUC  Current vancomycin therapeutic monitoring goal: 400-600 mg*h/L    Current estimated CrCl = Estimated Creatinine Clearance: 35.8 mL/min (A) (based on SCr of 1.64 mg/dL (H)).    Creatinine for last 3 days  2021:  2:46 PM Creatinine 0.55 mg/dL  2021:  5:33 AM Creatinine 0.89 mg/dL;  2:59 PM Creatinine 1.16 mg/dL  2021:  5:07 AM Creatinine 1.65 mg/dL  2021:  3:18 AM Creatinine 1.64 mg/dL    Recent Vancomycin Levels (past 3 days)  2021:  3:18 AM Vancomycin 21.9 mg/L    Vancomycin IV Administrations (past 72 hours)                   vancomycin (VANCOCIN) 750 mg in sodium chloride 0.9 % 250 mL intermittent infusion (mg) 750 mg New Bag 21 1020     750 mg New Bag 21 2255    vancomycin 1250 mg in 0.9% NaCl 250 mL intermittent infusion 1,250 mg (mg) 1,250 mg New Bag 21 1104                Nephrotoxins and other renal medications (From now, onward)    Start     Dose/Rate Route Frequency Ordered Stop    21 1600  vancomycin (VANCOCIN) 750 mg in sodium chloride 0.9 % 250 mL intermittent infusion         750 mg  over 60-90 Minutes Intravenous EVERY 24 HOURS 21 1340      21 1800  tacrolimus (GENERIC EQUIVALENT) capsule 1.5 mg        \"Or\" Linked Group Details    1.5 mg Oral 2 TIMES DAILY. 21 1530      21 1800  tacrolimus (GENERIC EQUIVALENT) suspension 1.5 mg        \"Or\" Linked Group Details    1.5 mg Oral 2 TIMES DAILY. 21 1530      21 1300  furosemide (LASIX) 500 mg/50mL infusion ADULT MAX CONC         10 mg/hr  1 mL/hr  Intravenous CONTINUOUS 21 1100               Contrast Orders - past 72 hours (72h ago, onward)            None        Interpretation of levels and current " regimen:  Vancomycin level is reflective of therapeutic level.    Has serum creatinine changed greater than 50% in last 72 hours: Yes    Urine output:  good urine output in the setting of furosemide infusion at 10mg/hr    Renal Function: Worsened from baseline    Insight[employer] Prediction of Planned New Vancomycin Regimen  Exposure target: AUC24 (range)400-600 mg/L.hr   AUC24,ss: 517 mg/L.hr  Probability of AUC24 > 400: 91 %  Ctrough,ss: 16.7 mg/L  Probability of Ctrough,ss > 20: 24 %  Probability of nephrotoxicity (Lodise DENA 2009): 12 %      Plan:  1. Change to 750mg IV vancomycin q24h.   2. Vancomycin monitoring method: AUC  3. Vancomycin therapeutic monitoring goal: 400-600 mg*h/L  4. Pharmacy will check vancomycin levels as appropriate in 1-3 days.  5. Serum creatinine levels will be ordered daily for the first week of therapy and at least twice weekly for subsequent weeks.    Radha Baca, PharmD, BCCCP

## 2021-11-21 NOTE — PROGRESS NOTES
Immunosuppression Note:    Berta Nava is a 56 year old female who is seen today  for immunosuppression management     I, Ernesto Schmitt MD, I have examined the patient with the resident/PA/Fellow, discussed and agree with the note and findings.  I have reviewed today's vital signs, medications, labs and imaging. I reviewed the immunosuppression medications and levels. I spoke to the patient/family and explained below clinical details and answered all the questions    Transplant Surgery  Inpatient Daily Progress Note  2021    Assessment & Plan: Berta Nava is a 56 year old female with PMH significant for PSC/UC, Sjogren's syndrome, HTN, liver transplant aborted in 2021 d/t V tach. S/p  donor liver transplant with biliary stent on 21, complicated by coagulopathy and finding of intra-abdominal abscess. Return to OR 21 for washout and abdominal closure.    Graft function: DDLTx 21 POD #3/2 from washout/closure. Transaminases improving, TB normalized, 0.8  - US: left hepatic artery and left portal vein not visualized due to limited sonographic windows. Repeat US post closure:normal, patent doppler .  Immunosuppression management: Induction w/ steroids.  -Tacro 1.5mg BID, titrate to a level goal 8-10. Level 7 today after changing to po. No dose adjustment today, repeat level in am. Monitor daily in setting of fluconazole   -MMF 750mg BID   -Steroid taper per protocol.  Complexity of management: High. Contributing factors:  induction.  Hematology:   ACD/ABL: Hgb 6.8, transfuse 1 PRBC today.   Thrombocytopenia, acute:   Plt 39, down from >300 on admission. Monitor. No transfusion  Coagulopathy: INR 1.34, fib 407. Monitor.  Cardiorespiratory:   Circulatory failure/ Hypotension:weaned off pressors 1120.   Post-op mechanical ventilation: Extubated . RA. Continue CDB/IS  GI/Nutrition:   Diet: CLD, Advance diet as donna. FT placed, currently in stomach-will need to advance  to post pyloric.   Post op ileus: Continue bowel regimen  Endocrine:   Steroid induced hyperglycemia: Hgb A1c 5.4, -170, Continue insulin gtt.  Fluid/Electrolytes: Hypervolemia : +5Kg yesterday, initialized diuresis with Lasix gtt 10/hr +metolazone 5 with good results. UOP 1.1L today. Hold lasix.Obtain daily weight  GONZALO: Cr 1.6 stable today but up from ~0.9. Monitor. UOP improving with diurese today  Hypokalemia: K 2.8-supplement per ICU. Repeat BMP tonight.  : Jurado to remain due to strict I&O  Infectious disease: Afebrile,  Leukocytosis WBC17.5  (16.6)  Intra abdominal abscess: Noted in OR on 11/18. Gram stain + GPC. Culture Staph epi. On vanco. Continue zosyn x4 days post closure. Continue fluconazole.   Prophylaxis: DVT(mechanical), fall, GI (PPI), Fungal ppx (Diflucan) CMV ppx (gancyclovir), PCP ppx (bactrim)  Disposition: SICU    Medical Decision Making: High  Subsequent visit 58895 (high level decision making)    [place]/Fellow/Resident Provider: Nini Zhou NP     Faculty: Ernesto Schmitt M.D.    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    11/18/2021 (Liver), Postoperative day: 3     Interval History:  Obtain from the patient     Overnight events: NAEs. Reports pain with activity. No Nausea. No bm but +flatus. Tolerating cld. Up with PT to chair. UOP /hr.      ROS:   A 10-point review of systems was negative except as noted above.    Curent Meds:   aspirin  325 mg Oral or Feeding Tube Daily    fluconazole  200 mg Intravenous Q24H    magnesium oxide  400 mg Oral BID    multivitamins w/minerals  15 mL Per Feeding Tube Daily    mycophenolate  750 mg Oral BID IS    Or    mycophenolate  750 mg Oral or NG Tube BID IS    pantoprazole  40 mg Oral  "BID AC    polyethylene glycol  17 g Oral Daily    [START ON 11/22/2021] predniSONE  25 mg Oral Once    Followed by    [START ON 11/23/2021] predniSONE  10 mg Oral Once    protein modular  1 packet Per Feeding Tube Daily    senna-docusate  1 tablet Oral BID    Or    senna-docusate  2 tablet Oral BID    sodium chloride (PF)  3 mL Intravenous Q8H    sulfamethoxazole-trimethoprim  1 tablet Oral Daily    tacrolimus  1.5 mg Oral BID IS    Or    tacrolimus  1.5 mg Oral BID IS    [START ON 11/22/2021] valGANciclovir  450 mg Oral Every Other Day    Or    [START ON 11/22/2021] valGANciclovir  450 mg Oral or NG Tube Every Other Day    vancomycin  750 mg Intravenous Q24H       Physical Exam:     Admit Weight: 57.6 kg (127 lb)    Current Vitals:   /74   Pulse 62   Temp 97.6  F (36.4  C) (Oral)   Resp 14   Ht 1.651 m (5' 5\")   Wt 60.7 kg (133 lb 13.1 oz)   LMP  (LMP Unknown)   SpO2 98%   BMI 22.27 kg/m      CVP (mmHg): 5 mmHg    Vital sign ranges:    Temp:  [97.5  F (36.4  C)-98.8  F (37.1  C)] 97.6  F (36.4  C)  Pulse:  [58-68] 62  Resp:  [14-18] 14  BP: (117-145)/(66-82) 132/74  FiO2 (%):  [21 %] 21 %  SpO2:  [96 %-100 %] 98 %  Patient Vitals for the past 24 hrs:   BP Temp Temp src Pulse Resp SpO2   11/21/21 1400 132/74 -- -- 62 14 98 %   11/21/21 1300 (!) 145/82 -- -- 58 18 100 %   11/21/21 1200 130/75 97.6  F (36.4  C) Oral 62 16 100 %   11/21/21 1100 137/76 -- -- 60 18 96 %   11/21/21 1000 131/69 -- -- 59 16 100 %   11/21/21 0900 129/74 -- -- 66 16 99 %   11/21/21 0800 125/72 97.9  F (36.6  C) Oral 65 18 97 %   11/21/21 0700 132/75 -- -- 61 16 97 %   11/21/21 0600 126/73 98  F (36.7  C) Axillary 58 16 98 %   11/21/21 0500 124/73 97.8  F (36.6  C) Axillary 61 16 98 %   11/21/21 0445 -- 97.7  F (36.5  C) Axillary -- -- --   11/21/21 0400 121/70 97.7  F (36.5  C) Axillary 62 16 98 %   11/21/21 0300 121/66 -- -- 64 16 98 %   11/21/21 0200 125/73 -- -- 62 16 99 %   11/21/21 0100 124/76 -- -- 63 16 99 %   11/21/21 " 0000 123/70 -- -- 68 16 98 %   11/20/21 2300 -- -- -- -- 16 --   11/20/21 2200 -- -- -- -- 16 --   11/20/21 2100 -- -- -- -- 16 --   11/20/21 2000 125/71 97.5  F (36.4  C) Axillary 67 16 100 %   11/20/21 1900 120/76 -- -- 66 -- 99 %   11/20/21 1800 -- -- -- 61 -- 99 %   11/20/21 1700 117/67 -- -- 64 -- 99 %   11/20/21 1600 -- 98.8  F (37.1  C) Axillary 64 16 98 %     General Appearance: NAD  Skin: warm, dry, no jaundice. Scattered ecchymosis. See abd exam  Heart: regular rate and rhythm  Lungs:   NLB on RA  Abdomen: soft, no guarding, incision with sutures intact with minimal s/s drainage at trifurcation. ROMA x2 s/s output.   :Jurado present with clear yellow UOP  Extremities: edema: BRIGHT +2 bilaterally  Neurologic: A0x3, awake.     Frailty Scores       Frailty Scores 3/23/2021 3/13/2021    Final Score Not Frail Not Frail    Final Score Number 0 0            Data:   CMP  Recent Labs   Lab 11/21/21  1429 11/21/21  1156 11/21/21  0331 11/21/21  0318 11/20/21  1754 11/20/21  1752 11/20/21  0806 11/20/21  0507 11/20/21  0144 11/19/21  0541 11/19/21  0533 11/18/21  1559 11/18/21  1446   NA  --   --   --  134  --   --   --  137  --    < > 140  --  143   POTASSIUM  --   --   --  2.8*  --   --   --  3.7  --    < > 3.6   < > 3.2*   CHLORIDE  --   --   --  105  --   --   --  110*  --    < > 111*  --  110*   CO2  --   --   --  21  --   --   --  16*  --    < > 22  --  26   * 138*   < > 135*   < >  --    < > 176*  --    < > 128*   < > 128*   BUN  --   --   --  39*  --   --   --  27  --    < > 21  --  14   CR  --   --   --  1.64*  --   --   --  1.65*  --    < > 0.89  --  0.55   GFRESTIMATED  --   --   --  34*  --   --   --  34*  --    < > 72  --  >90   MARIELENA  --   --   --  7.5*  --   --   --  8.0*  --    < > 8.6  --  9.3   ICAW  --   --   --  4.6  --   --   --   --  4.1*   < > 4.9  --   --    MAG  --   --   --  1.9  --   --   --  1.8  --   --  1.8   < > 1.7   PHOS  --   --   --  4.8*  --   --   --  5.3*  --   --  3.9   < >  2.7   AMYLASE  --   --   --   --   --   --   --   --   --   --  36  --  31   LIPASE  --   --   --   --   --   --   --   --   --   --  83  --  156   ALBUMIN  --   --   --  2.0*  --  2.1*  --  2.3*  --    < > 2.5*  --  1.6*  1.6*   BILITOTAL  --   --   --  0.8  --  1.0  --  0.9  --    < > 1.3  --  3.1*  3.1*   ALKPHOS  --   --   --  37*  --  37*  --  33*  --    < > 36*  --  41  41   AST  --   --   --  22  --  28  --  36  --    < > 98*  --  280*  280*   ALT  --   --   --  79*  --  84*  --  92*  --    < > 140*  --  200*  200*    < > = values in this interval not displayed.     CBC  Recent Labs   Lab 11/21/21 0318 11/20/21  0507 11/19/21  0127 11/19/21  0126   HGB 6.8* 7.4*   < >  --    WBC 14.3* 17.5*   < >  --    PLT 39* 52*   < >  --    A1C  --   --   --  5.4    < > = values in this interval not displayed.     COAGS  Recent Labs   Lab 11/21/21  0318 11/20/21  0508 11/20/21  0146   INR 1.34*  --  1.57*   PTT 31 33 33      Urinalysis  Recent Labs   Lab Test 11/20/21  1030 11/06/21  0509 06/10/21  0632 05/21/21  0730   COLOR Yellow Dark Yellow*   < > Dark Yellow   APPEARANCE Clear Clear   < > Clear   URINEGLC Negative Negative   < > Negative   URINEBILI Negative Moderate*   < > Negative   URINEKETONE Negative Negative   < > Negative   SG 1.020 1.018   < > 1.031   UBLD Small* Negative   < > Negative   URINEPH 5.0 5.5   < > 6.0   PROTEIN 30 * Negative   < > 20*   NITRITE Negative Negative   < > Negative   LEUKEST Negative Negative   < > Negative   RBCU 7* <1   < > 1   WBCU 3 1   < > 4   UTPG  --   --   --  0.26*    < > = values in this interval not displayed.     Virology:  Hepatitis C Antibody   Date Value Ref Range Status   11/17/2021 Nonreactive Nonreactive Final

## 2021-11-21 NOTE — PROGRESS NOTES
SICU made aware of critical lab results of HgB of 6.8 and platelets of 38,000. Order placed to transfuse one unit of blood.

## 2021-11-21 NOTE — PLAN OF CARE
Major Shift Events: Self extubated this AM, tolerating room air. Pt alert and oriented, call light appropriate. Lines removed, new triple lumen internal jugular placed. Tf at 20 via NG. Lasix gtt started, monitoring UO via Jurado. ROMA drains x2. Clear liquid diet added. Oxycodone given for pain x1. Insulin gtt for BG control.   Plan: Cont to monitor labs, notify MD with any changes or concerns.  For vital signs and complete assessments, please see documentation flowsheets.

## 2021-11-21 NOTE — PLAN OF CARE
Problem: Bleeding (Liver Transplant)  Goal: Absence of Bleeding  Outcome: No Change     Major Shift Events:      Extubated yesterday (11/20) and manages airway well on room air. Hemodynamically stable, but required 1 unit RBC for a HgB of 6.8. Lasix still infusing with UOP between 150-400mL every 2 hours. Drains output have decreased over past couple days. One PRN oxy given for moderate pain.    For vital signs and complete assessments, please see documentation flowsheets.

## 2021-11-21 NOTE — PROGRESS NOTES
11/21/21 0950   Quick Adds   Type of Visit Initial Occupational Therapy Evaluation   Living Environment   People in home spouse   Current Living Arrangements house   Home Accessibility stairs to enter home;stairs within home   Number of Stairs, Main Entrance 2   Stair Railings, Main Entrance none   Number of Stairs, Within Home, Primary 8;other (see comments)   Stair Railings, Within Home, Primary railing on right side (ascending)   Transportation Anticipated family or friend will provide   Living Environment Comments Patient lives with spouse in 3 level home with basement, only needs to access main level and 2nd level where bedroom/bathroom are located, however reports she is able to remain on main floor if necessary. Pt has walk in shower with shower chair and grab bars, however reports not typically using shower chair. 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 No AD use at baseline.   Instrumental Activities of Daily Living (IADL)   Previous Responsibilities meal prep;housekeeping   IADL Comments /daughter able to assist PRN upon discharge   Disability/Function   Hearing Difficulty or Deaf yes   Patient's preferred means of communication English speaker with hearing loss, no speech problems.   Describe hearing loss bilateral hearing loss   Use of hearing assistive devices bilateral hearing aids   Were auxiliary aids offered? yes   The following aids were provided; patient declined offer of auxiliary aids   Hearing Management Hearing aids from home   Wear Glasses or Blind yes   Vision Management reading  glasses   Concentrating, Remembering or Making Decisions Difficulty no   Difficulty Communicating no   Difficulty Eating/Swallowing no   Walking or Climbing Stairs Difficulty no   Dressing/Bathing Difficulty no   Toileting issues no   Doing Errands Independently Difficulty (such as shopping) yes   Errands Management Family assist PRN   Fall history within last six months no   Change in Functional Status Since Onset of Current Illness/Injury yes   General Information   Onset of Illness/Injury or Date of Surgery 21   Referring Physician Alan Morales MD   Patient/Family Therapy Goal Statement (OT) None stated    Additional Occupational Profile Info/Pertinent History of Current Problem Berta Nava is a 56 year old female with PMH significant for PSC/UC, Sjogren's syndrome, HTN, liver transplant aborted in 2021 d/t V tach. S/p  donor liver transplant with biliary stent on 21, complicated by coagulopathy and finding of intra-abdominal abscess. Return to OR 21 for washout and abdominal closure.   Existing Precautions/Restrictions fall;abdominal   Left Upper Extremity (Weight-bearing Status) partial weight-bearing (PWB)  (10 lb)   Right Upper Extremity (Weight-bearing Status) partial weight-bearing (PWB)  (10 lb)   Left Lower Extremity (Weight-bearing Status) full weight-bearing (FWB)   Right Lower Extremity (Weight-bearing Status) full weight-bearing (FWB)   General Observations and Info Activity: ICU early mob    Cognitive Status Examination   Orientation Status orientation to person, place and time   Cognitive Status Comments Pt A& Ox4, increased processing time needed occasionally. Endorses some confusion initially following surgery. Will continue to monitor   Visual Perception   Visual Impairment/Limitations corrective lenses for reading   Sensory   Sensory Quick Adds No deficits were identified   Pain Assessment   Patient Currently in Pain Yes, see Vital Sign flowsheet    Integumentary/Edema   Integumentary/Edema Comments Mild edema in hands/feet   Posture   Posture not impaired   Range of Motion Comprehensive   Comment, General Range of Motion BUE appears grossly WFL   Strength Comprehensive (MMT)   Comment, General Manual Muscle Testing (MMT) Assessment Generalized weakness, defer formal MMT d/t precautions   Coordination   Functional Limitations Fine motor ADL performance impaired;Impaired ability to perform bilateral tasks;Decreased speed   Coordination Comments Bilateral hand weakness noted   Clinical Impression   Criteria for Skilled Therapeutic Interventions Met (OT) yes;meets criteria;skilled treatment is necessary   OT Diagnosis Decreased ADL/IADL I   OT Problem List-Impairments impacting ADL problems related to;activity tolerance impaired;cognition;pain;strength;post-surgical precautions   Assessment of Occupational Performance 5 or more Performance Deficits   Identified Performance Deficits Dressing, bathing, toileting, g/h, home mgmt   Planned Therapy Interventions (OT) ADL retraining;IADL retraining;cognition;strengthening;transfer training;home program guidelines;progressive activity/exercise;risk factor education   Clinical Decision Making Complexity (OT) moderate complexity   Therapy Frequency (OT) 6x/week   Predicted Duration of Therapy 2 weeks   Anticipated Equipment Needs Upon Discharge (OT) other (see comments)  (TBD)   Risk & Benefits of therapy have been explained evaluation/treatment results reviewed;care plan/treatment goals reviewed;risks/benefits reviewed;current/potential barriers reviewed;participants voiced agreement with care plan;participants included;patient   Comment-Clinical Impression Pt will benefit from skilled OT services to progress ADL/IADL I and support safe discharge plan   OT Discharge Planning    OT Discharge Recommendation (DC Rec) Home with assist;home with home care occupational therapy   OT Rationale for DC Rec Pt progressing well POD  1, limited by post-op pain/precautions, deconditioning and potential mild cognitive deficits. Pt has good family support at home, reports they will be able to assist PRN upon discharge with heavy ADL/IADL tasks. Pending continued progress w/ therapies, anticipate pt will be appropriate to discharge home w/ A and HH OT when medically ready. Will continue to assess and update recs as indicated.   OT Brief overview of current status  CGA SPT w/ FWW   Total Evaluation Time (Minutes)   Total Evaluation Time (Minutes) 5

## 2021-11-21 NOTE — PLAN OF CARE
Major Shift Events:  A&Ox4. Neuros intact. Afebrile. SB/SR 50-70s. Advanced to regular diet. No BM, flatus+. NG w/ TF @ goal. Jurado in adequate UO. Lasix gtt held. ROMA x2. Clamshell incision open to air. K+ replaced. Insulin gtt on Algorithm 4. Oxy given x1 for incisional pain. Up to chair for most of day.   Plan: Continue to monitor and update team if there are any changes.   For vital signs and complete assessments, please see documentation flowsheets.

## 2021-11-21 NOTE — SIGNIFICANT EVENT
SPIRITUAL HEALTH SERVICES Significant Event  [employer] Sacrament of ANOINTING  [facility] ([place] 4a    Pt anointed by Father Becca Salas   Pager [phone]

## 2021-11-21 NOTE — PROGRESS NOTES
SURGICAL ICU PROGRESS NOTE  11/21/2021        Date of Service (when I saw the patient): 11/21/2021    ASSESSMENT:  Berta Nava is a 56 year old female who presented on 11/17/2021 with PMH of primary sclerosing cholangitis s/p ERCP, cirrhosis c/b ascites who presents with HE/AMS for potential liver transplant. The patient was scheduled to get liver txp one month ago, however, upon induction of anesthesia, the patient went into unstable Vtach and the case was aborted.  EBL was 10 L; total 22 U of blood, 3 of platelets, 13 FFP, 2 cryo, crystalloid 8cc  and 2400cc of cellsaver. Her abdomen was temporally closed with VAC, returned to OR 11/19/21. Self- Extubated 11/20/21.     CHANGES and MAJOR THINGS TODAY:   Diet--CLD   Lasix gtt, keep ferguson for now   Steroid taper per Transplant   Confer with transplant re transfer to .       PLAN:    Neuro/ pain/ sedation:  # Postoperative pain management  - oxy and dilaudid     # Hepatic encephalopathy / AMS  - Hold PTA lactulose  - Allow re-establishment of night/day sleep wake cycle      Pulmonary care:   # postoperative respiratory support-- self extubated on 11/20/21   - Supplemental oxygen to keep saturation above 92 %--now on Room Air   - voice hoarse per Daughter, should improved      Cardiovascular:    # Hx of Vtach  - Monitor hemodynamic status.   - CVP goal 8< CVP<12  - Maintain MAP > 65     GI:   # Primary sclerosing cholangitis c/b strictures s/p ERCP  # S/P DDLT   # protein calorie malnutrition    - nutrition consult for TF management    - Feeds via NJ   - Hold PTA omeprazole (auto sub for protonix)   - Hold ursodiol   - Hold sucralfate  - bowel regimen: senna/miralax      Renal/Fluids/ Electrolytes/ Nutrition:   # hx of GONZALO  # hypervolemia   - furosemide gtt at 10 ml/hr  - Hold PTA lasix and Spironolactone  - ICU electrolyte replacement protocol  - stop D5 1/2 NS infusion      Endocrine:    # steroid induced hyperglycemia   - insulin gtt for BS goal <180 while  on steroid taper. Transition to subcutaneous insulin when stable      ID/ Antibiotics:  # suprarenal abscess--staph epi (sensitive to Vancomycin)   # immunosuppression   - intraoperative cx from abscess with Staph epi--sensitive to Vancomycin  - Zosyn 48hr (completed)   - Fluconazole x5 days  ( will need to start oral nystatin once this course completes)     Immunoprophylaxis/immunosuppression:   - Cellcept/Tacrolimus per transplant   - Trimethoprim-sulfamethoxazole for PCP prophylaxis. Ganciclovir for CMV ppx      Heme:  # Anemia due to blood loss  # Coagulopathy  - Hemoglobin 6.8---> one unit PRBC this am,   - Monitor hgb; transfuse if Hgb < 7, replace coagulation products as needed      MSK:  # weakness and deconditioning of critical illness   - PT and OT consulted.   - OOB, to chair later       Prophylaxis:    - Mechanical prophylaxis for DVT.   - No chemical DVT prophylaxis due to high risk of bleeding.   - GI ppx: Protonic BID     Lines/ tubes/ drains:  - Jurado  - Feeding tube   - internal jugular-right        Patient seen, findings and plan discussed with surgical ICU staff, Dr. Terry     Time spent on this Encounter   Billing:  I spent 35 minutes bedside and on the inpatient unit today managing the care of Berta Nava in relation to the issues listed in this note.      Julio Brand  ====================================  INTERVAL HISTORY:  Course reviewed. No acute events overnight. Hgb 6.8 this am, one unit PRBC transfused. Up to chair this am. Reports some abdominal discomfort. Denies chest pain or SOB.     OBJECTIVE:   1. VITAL SIGNS:   Temp:  [97.5  F (36.4  C)-98.8  F (37.1  C)] 97.6  F (36.4  C)  Pulse:  [58-68] 58  Resp:  [16-18] 18  BP: (117-145)/(66-82) 145/82  FiO2 (%):  [21 %] 21 %  SpO2:  [96 %-100 %] 100 %  Ventilation Mode: (S) CPAP/PS  (Continuous positive airway pressure with Pressure Support)  FiO2 (%): 21 %  Rate Set (breaths/minute): 14 breaths/min  Tidal Volume Set (mL): 450  mL  PEEP (cm H2O): 5 cmH2O  Pressure Support (cm H2O): 7 cmH2O  Oxygen Concentration (%): 30 %  Resp: 18      2. INTAKE/ OUTPUT:   I/O last 3 completed shifts:  In: 4780.79 [P.O.:500; I.V.:3366.79; NG/GT:150]  Out: 2098 [Urine:1765; Drains:333]    3. PHYSICAL EXAMINATION:  General: sitting up in chair. NAD   HEENT: pupils 3mm and reactive. OP clear.  NJT in place with feeds infusing. Voice with some hoarseness   Neuro: A&O. RYAN   Pulm/Resp: Clear breath sounds bilaterally without rhonchi, crackles or wheezes, on room air   CV: RRR, S1/S2   Abdomen: incision dressed, ROMA drain x2 with serosangious drainage in bulbs.   : (+) ferguson catheter in place, urine yellow and clear  MSK/Extremities: no peripheral edema, moving all extremities, peripheral pulses intact, calves soft, extremities well perfused, 2+.     4. INVESTIGATIONS:   Arterial Blood Gases   Recent Labs   Lab 11/20/21  0508 11/20/21  0144 11/19/21  1941 11/19/21  1458   PH 7.48* 7.45 7.42 7.43   PCO2 25* 25* 30* 30*   PO2 172* 177* 158* 125*   HCO3 18* 17* 20* 20*     Complete Blood Count   Recent Labs   Lab 11/21/21  0318 11/20/21  0507 11/19/21  1942 11/19/21  1341 11/19/21  1005 11/19/21  0533 11/19/21  0127   WBC 14.3* 17.5*  --   --   --  16.6* 16.6*   HGB 6.8* 7.4* 8.0* 8.6*   < > 8.4* 9.4*   PLT 39* 52*  --   --   --  61* 78*    < > = values in this interval not displayed.     Basic Metabolic Panel  Recent Labs   Lab 11/21/21  1156 11/21/21  1000 11/21/21  0822 11/21/21  0331 11/21/21  0318 11/20/21  0806 11/20/21  0507 11/20/21  0016 11/19/21  2305 11/19/21  1601 11/19/21  1459 11/19/21  1451 11/19/21  1341 11/19/21  0541 11/19/21  0533   NA  --   --   --   --  134  --  137  --   --   --  138  --  136   < > 140   POTASSIUM  --   --   --   --  2.8*  --  3.7  --  3.4  --  3.5  --  3.7   < > 3.6   CHLORIDE  --   --   --   --  105  --  110*  --   --   --  111*  --   --   --  111*   CO2  --   --   --   --  21  --  16*  --   --   --  20  --   --   --  22    BUN  --   --   --   --  39*  --  27  --   --   --  24  --   --   --  21   CR  --   --   --   --  1.64*  --  1.65*  --   --   --  1.16*  --   --   --  0.89   * 111* 117* 126* 135*   < > 176*   < >  --    < > 109*   < > 127*   < > 128*    < > = values in this interval not displayed.     Liver Function Tests  Recent Labs   Lab 11/21/21  0318 11/20/21  1752 11/20/21  0507 11/20/21  0146 11/19/21  1459 11/19/21  0533 11/19/21  0126   AST 22 28 36  --  69* 98*  --    ALT 79* 84* 92*  --  117* 140*  --    ALKPHOS 37* 37* 33*  --  34* 36*  --    BILITOTAL 0.8 1.0 0.9  --  1.2 1.3  --    ALBUMIN 2.0* 2.1* 2.3*  --  2.4* 2.5*  --    INR 1.34*  --   --  1.57*  --  1.63* 1.63*     Pancreatic Enzymes  Recent Labs   Lab 11/19/21  0533 11/18/21  1446 11/17/21  1401   LIPASE 83 156  --    AMYLASE 36 31 81     Coagulation Profile  Recent Labs   Lab 11/21/21  0318 11/20/21  0508 11/20/21  0146 11/19/21  2305 11/19/21  1005 11/19/21  0533 11/19/21  0126   INR 1.34*  --  1.57*  --   --  1.63* 1.63*   PTT 31 33 33 33   < > 33 34    < > = values in this interval not displayed.         5. RADIOLOGY:   Recent Results (from the past 24 hour(s))   XR Chest Port 1 View    Narrative    EXAM: XR CHEST PORT 1 VIEW  11/20/2021 5:29 PM     HISTORY:  central line placement       COMPARISON:  Chest x-ray 11/18/2021, 11/17/2021    FINDINGS  Technique: Semiupright AP view of the chest.     Devices: [place] catheter has been removed. Right internal jugular  approach central venous catheter terminates overlying the mid SVC.  Enteric tube passes below the left hemidiaphragm, with tip collimated  off the field of view. Endotracheal tube has been removed.    Lungs: No new focal airspace opacity.  No discernible pneumothorax.   No definite pleural effusion.     Cardiovascular: Cardiomediastinal silhouette is stable in size.      Bones: No acute osseous abnormality.     Upper abdomen: Surgical clips and surgical drains projected over the  right  upper abdomen.      Impression    IMPRESSION:     1. Right internal jugular approach central venous catheter tip  projects over the mid SVC.   2. Increased right basilar opacity which may represent infection  versus atelectasis    I have personally reviewed the examination and initial interpretation  and I agree with the findings.    BEVERLY RAM MD         SYSTEM ID:  [id number]       =========================================

## 2021-11-21 NOTE — PROGRESS NOTES
CO-MORBIDITIES:   Liver transplant planned  (primary encounter diagnosis)  Encephalopathy  Hepatic encephalopathy (H)  Chronic liver failure without hepatic coma (H)     ASSESSMENT: Berta Nava is a 56 year old female who presented on 11/17/2021 with PMH of primary sclerosing cholangitis s/p ERCP, cirrhosis c/b ascites who presents with HE/AMS for potential liver transplant. The patient was scheduled to get liver txp one month ago, however, upon induction of anesthesia, the patient went into unstable Vtach and the case was aborted.  In the OR, EBL was 10 L; she received 22 U of blood, 3 of platelets, 13 FFP, 2 cryo, crystalloid 8cc  and 2400cc of cellsaver. Her abdomen was temporally closed with VAC and came to the floor with propofol gtt and intubated. She will RTOR on 11/20/21.     MELD-Na score: 25 at 11/17/2021  2:01 PM  MELD score: 16 at 11/17/2021  2:01 PM  Calculated from:  Serum Creatinine: 0.92 mg/dL (Using min of 1 mg/dL) at 11/17/2021  2:01 PM  Serum Sodium: 126 mmol/L at 11/17/2021  2:01 PM  Total Bilirubin: 6.6 mg/dL at 11/17/2021  2:01 PM  INR(ratio): 1.24 at 11/17/2021  2:01 PM  Age: 56 years     PLAN:  Lasix gtt at 10 ml/hr  Metolazone 5 mg  Swallow test  remove PAC and 1 MAC line, replace 1 MAC with CVC        Neuro/ pain/ sedation:  # Postoperative pain management  - oxy and dilaudid  # Hepatic encephalopathy / AMS  - Hold PTA lactulose     Pulmonary care:   # postoperative respiratory support  - Supplemental oxygen to keep saturation above 92 %.  - Wean FiO2 as tolerated     Cardiovascular:    # Post op hemodynamic management  # Hx of Vtach  - Monitor hemodynamic status.   - CVP goal 8< CVP<12  - Maintain MAP > 65  - Pressor: off   - Monitor hemodynamic status     GI care:   # Primary sclerosing cholangitis c/b strictures s/p ERCP  # S/P DDLT   - NPO except medications.  - OG in place  - pantoprazole 40 mg every day   - nutrition consult for NJT placement (NGT in place)  - Hold PTA  omeprazole  - Hold ursodiol   - Hold sucralfate     Renal/Fluids/ Electrolytes/ Nutrition:   # hx of GONZALO  - D5 1/2 NS at 100ml/hr  for IV fluid hydration  - decrease IVF when taking PO  - 5 mg metolazone   - furosemide gtt at 10 ml/hr  - Hold PTA lasix and Spironolactone  - ICU electrolyte replacement protocol  - Urine output 1190//200  - Will continue to monitor intake and output.      Endocrine:    - insulin gtt for BS goal <180     ID/ Antibiotics:  # suprarenal abscess  # ppx abx  - intraoperative cx from abscess grew GPC, start vanc  - Zosyn 48hr  - Trimethoprim-sulfamethoxazole for PCP prophylaxis  - Ganciclovir for CMV ppx   - micafungin or fluconazole, will clarify with txp     # Anemia due to blood loss  # Coagulopathy  - Hemoglobin 7.4  - INR 1.63, Platelets 60, Fibrinogen 297; no need for blood products at this time  - change labs to daily  - Monitor hgb; transfuse if Hgb < 7  - Monitor fibrinogen; fibrinogen goal >200  - Monitor platelet; replace if platelet < 50,000  - INR goal <2  - q12h CBC, q4h lactate, q4h BMP      Prophylaxis:    -Mechanical prophylaxis for DVT.   -No chemical DVT prophylaxis due to high risk of bleeding.   -GI ppx: Protonic BID      MSK:    - PT and OT consulted. Appreciate recs.      Lines/ tubes/ drains:  - ETT, OGT, Jurado, OG, 2 MAC, PAC (remove PAC and 1 MAC line, replace 1 MAC with CVC)      Disposition:  -Surgical ICU. General Cares:   - Code status: full      Patient seen, findings and plan discussed with ICU staff, Dr. Harrison Cruz MD  Anesthesiology, CA-1     ====================================     TODAY'S PROGRESS:   SUBJECTIVE:   Abdomen closed 11/19/21.   OBJECTIVE:   1. VITAL SIGNS:   Temp:  [96.8  F (36  C)-99.3  F (37.4  C)] 99.3  F (37.4  C)  Pulse:  [55-80] 68  Resp:  [14] 14  BP: (115)/(58) 115/58  MAP:  [63 mmHg-214 mmHg] 77 mmHg  Arterial Line BP: ()/() 108/54  FiO2 (%):  [30 %-40 %] 30 %  SpO2:  [97 %-100 %] 99 %  Ventilation Mode: (S)  CPAP/PS  (Continuous positive airway pressure with Pressure Support)  FiO2 (%): 30 %  Rate Set (breaths/minute): 14 breaths/min  Tidal Volume Set (mL): 450 mL  PEEP (cm H2O): 5 cmH2O  Pressure Support (cm H2O): 7 cmH2O  Oxygen Concentration (%): 30 %  Resp: 14       2. INTAKE/ OUTPUT:   I/O last 3 completed shifts:  In: 4072.54 [I.V.:2452.54; NG/GT:150]  Out: 1527 [Urine:682; Emesis/NG output:75; Drains:720; Blood:50]     3. PHYSICAL EXAMINATION:      General: comfortable in bed  Neuro: AAOx4  Resp: CTABL  CV: RRR, S1S2 WNL  Abdomen: distended, soft    Extremities: warm and well perfused. 2+ edema  Skin: no rashes or jaundice     4. INVESTIGATIONS:  All labs and imaging studies personally reviewed     Arterial Blood Gases          Recent Labs   Lab 11/20/21  0508 11/20/21  0144 11/19/21  1941 11/19/21  1458   PH 7.48* 7.45 7.42 7.43   PCO2 25* 25* 30* 30*   PO2 172* 177* 158* 125*   HCO3 18* 17* 20* 20*      Complete Blood Count               Recent Labs   Lab 11/20/21  0507 11/19/21  1942 11/19/21  1341 11/19/21  1221 11/19/21  1005 11/19/21  0533 11/19/21  0127 11/18/21  2334 11/18/21  2156   WBC 17.5*  --   --   --   --  16.6* 16.6*  --  20.7*   HGB 7.4* 8.0* 8.6* 7.6*   < > 8.4* 9.4*   < > 10.3*   PLT 52*  --   --   --   --  61* 78*  --  95*    < > = values in this interval not displayed.      Basic Metabolic Panel                    Recent Labs   Lab 11/20/21  1315 11/20/21  1043 11/20/21  0806 11/20/21  0507 11/20/21  0016 11/19/21  2305 11/19/21  1601 11/19/21  1459 11/19/21  1451 11/19/21  1341 11/19/21  1221 11/19/21  0541 11/19/21  0533 11/18/21  1559 11/18/21  1446   NA  --   --   --  137  --   --   --  138  --  136 137  --  140  --  143   POTASSIUM  --   --   --  3.7  --  3.4  --  3.5  --  3.7 3.7  --  3.6   < > 3.2*   CHLORIDE  --   --   --  110*  --   --   --  111*  --   --   --   --  111*  --  110*   CO2  --   --   --  16*  --   --   --  20  --   --   --   --  22  --  26   BUN  --   --   --  27  --    --   --  24  --   --   --   --  21  --  14   CR  --   --   --  1.65*  --   --   --  1.16*  --   --   --   --  0.89  --  0.55   * 93 154* 176*   < >  --    < > 109*   < > 127* 117*   < > 128*   < > 128*    < > = values in this interval not displayed.      Liver Function Tests            Recent Labs   Lab 11/20/21  0507 11/20/21  0146 11/19/21  1459 11/19/21  0533 11/19/21  0126 11/18/21 2156 11/18/21  1446   AST 36  --  69* 98*  --   --  280*  280*   ALT 92*  --  117* 140*  --   --  200*  200*   ALKPHOS 33*  --  34* 36*  --   --  41  41   BILITOTAL 0.9  --  1.2 1.3  --   --  3.1*  3.1*   ALBUMIN 2.3*  --  2.4* 2.5*  --   --  1.6*  1.6*   INR  --  1.57*  --  1.63* 1.63* 1.76* 1.97*      Pancreatic Enzymes        Recent Labs   Lab 11/19/21  0533 11/18/21  1446 11/17/21  1401   LIPASE 83 156  --    AMYLASE 36 31 81      Coagulation Profile             Recent Labs   Lab 11/20/21  0508 11/20/21  0146 11/19/21  2305 11/19/21  1942 11/19/21  1005 11/19/21  0533 11/19/21  0126 11/18/21  2156   INR  --  1.57*  --   --   --  1.63* 1.63* 1.76*   PTT 33 33 33 34   < > 33 34 33    < > = values in this interval not displayed.            5. RADIOLOGY:       Recent Results (from the past 24 hour(s))   XR Abdomen Port 1 View     Narrative     Exam: XR ABDOMEN PORT 1 VIEWS, 11/19/2021 4:49 PM     Indication: Evaluate FT     Comparison: Same day     Findings:   Enteric tube distal tip projects over the distal stomach. The feeding  tube is slightly further near the level of the pylorus/proximal  duodenum. Nonobstructive bowel gas pattern. No portal venous gas.  Right upper quadrant drains and biliary stent in place. [place]-Amara  catheter tip projects over the main pulmonary artery.        Impression     Impression: Enteric tube distal tip projects over the distal stomach.  Feeding tube is slightly further near the level of the  pylorus/proximal duodenum with redundant tubing in the gastric  fundus/body.     I have personally  reviewed the examination and initial interpretation  and I agree with the findings.     RONN CHEUNG DO         SYSTEM ID:  [id number]   US Liver Transplant Follow Up Portable     Narrative     EXAMINATION: US LIVER TRANSPLANT FOLLOW UP PORTABLE, 11/19/2021 6:34  PM      COMPARISON: 11/18/2021     HISTORY: Liver transplant status post closure     TECHNIQUE:  Gray-scale, color Doppler and spectral flow analysis.     FINDINGS:      Liver:   The liver demonstrates normal homogeneous echotexture. No  evidence of a focal hepatic mass.      Bile Ducts: No intrahepatic biliary dilation. Common bile duct not  seen.     Gallbladder: The gallbladder is surgically absent.     Kidneys:   Right kidney:  The right kidney demonstrates normal echotexture with  no evidence of a shadowing stone, focal mass or hydronephrosis.   11.1  cm in long axis dimension.     Pancreas: Visualized portions of the pancreas are normal in  appearance.     Visualized portions of the aorta are unremarkable.     LIVER DOPPLER:  Splenic vein:  Patent continuous normal antegrade direction flow  towards the liver, 34 cm/sec.  Extrahepatic portal vein:  Patent continuous antegrade flow, 80  cm/sec.  Portal vein at anastomosis: Patent continuous antegrade flow, 167  cm/sec.  Intrahepatic portal vein:  Patent continuous antegrade flow, 160  cm/sec.  Right portal vein flow is antegrade, measuring 95 cm/sec.  Left portal vein flow is antegrade, measuring 98 cm/sec.     Inferior vena cava: patent with flow toward the heart throughout..  IVC above anastomosis:  79 cm/sec.  IVC at anastomosis:  56 cm/sec.  Intrahepatic IVC:  66 cm/sec.  Extrahepatic IVC:  67 cm/sec.     Right, mid, left hepatic veins: Patent with flow towards the inferior  vena cava.     Extrahepatic hepatic artery: Low resistance waveform with flow towards  the liver. 50 cm/sec with resistive index 0.84. Previously 134 cm/s.  Right hepatic artery: 29 cm/sec with resistive index 0.72.  Previously  46.1  Left hepatic artery: 40 cm/sec with resistive index 0.78. Not  previously seen.  CO-MORBIDITIES:   Liver transplant planned  (primary encounter diagnosis)  Encephalopathy  Hepatic encephalopathy (H)  Chronic liver failure without hepatic coma (H)     ASSESSMENT: Betra Nava is a 56 year old female who presented on 11/17/2021 with PMH of primary sclerosing cholangitis s/p ERCP, cirrhosis c/b ascites who presents with HE/AMS for potential liver transplant. The patient was scheduled to get liver txp one month ago, however, upon induction of anesthesia, the patient went into unstable Vtach and the case was aborted.  In the OR, EBL was 10 L; she received 22 U of blood, 3 of platelets, 13 FFP, 2 cryo, crystalloid 8cc  and 2400cc of cellsaver. Her abdomen was temporally closed with VAC and came to the floor with propofol gtt and intubated. She will RTOR on 11/20/21.     MELD-Na score: 25 at 11/17/2021  2:01 PM  MELD score: 16 at 11/17/2021  2:01 PM  Calculated from:  Serum Creatinine: 0.92 mg/dL (Using min of 1 mg/dL) at 11/17/2021  2:01 PM  Serum Sodium: 126 mmol/L at 11/17/2021  2:01 PM  Total Bilirubin: 6.6 mg/dL at 11/17/2021  2:01 PM  INR(ratio): 1.24 at 11/17/2021  2:01 PM  Age: 56 years     PLAN:  Lasix gtt at 10 ml/hr  Metolazone 5 mg  Swallow test  remove PAC and 1 MAC line, replace 1 MAC with CVC        Neuro/ pain/ sedation:  # Postoperative pain management  - oxy and dilaudid  # Hepatic encephalopathy / AMS  - Hold PTA lactulose     Pulmonary care:   # postoperative respiratory support  - Supplemental oxygen to keep saturation above 92 %.  - Wean FiO2 as tolerated     Cardiovascular:    # Post op hemodynamic management  # Hx of Vtach  - Monitor hemodynamic status.   - CVP goal 8< CVP<12  - Maintain MAP > 65  - Pressor: off   - Monitor hemodynamic status     GI care:   # Primary sclerosing cholangitis c/b strictures s/p ERCP  # S/P DDLT   - NPO except medications.  - OG in place  -  pantoprazole 40 mg every day   - nutrition consult for NJT placement (NGT in place)  - Hold PTA omeprazole  - Hold ursodiol   - Hold sucralfate     Renal/Fluids/ Electrolytes/ Nutrition:   # hx of GONZALO  - D5 1/2 NS at 100ml/hr  for IV fluid hydration  - decrease IVF when taking PO  - 5 mg metolazone   - furosemide gtt at 10 ml/hr  - Hold PTA lasix and Spironolactone  - ICU electrolyte replacement protocol  - Urine output 1190//200  - Will continue to monitor intake and output.      Endocrine:    - insulin gtt for BS goal <180     ID/ Antibiotics:  # suprarenal abscess  # ppx abx  - intraoperative cx from abscess grew GPC, start vanc  - Zosyn 48hr  - Trimethoprim-sulfamethoxazole for PCP prophylaxis  - Ganciclovir for CMV ppx   - micafungin or fluconazole, will clarify with txp     # Anemia due to blood loss  # Coagulopathy  - Hemoglobin 7.4  - INR 1.63, Platelets 60, Fibrinogen 297; no need for blood products at this time  - change labs to daily  - Monitor hgb; transfuse if Hgb < 7  - Monitor fibrinogen; fibrinogen goal >200  - Monitor platelet; replace if platelet < 50,000  - INR goal <2  - q12h CBC, q4h lactate, q4h BMP      Prophylaxis:    -Mechanical prophylaxis for DVT.   -No chemical DVT prophylaxis due to high risk of bleeding.   -GI ppx: Protonic BID      MSK:    - PT and OT consulted. Appreciate recs.      Lines/ tubes/ drains:  - ETT, OGT, Jurado, OG, 2 MAC, PAC (remove PAC and 1 MAC line, replace 1 MAC with CVC)      Disposition:  -Surgical ICU. General Cares:   - Code status: full      Patient seen, findings and plan discussed with ICU staff, Dr. Harrison Cruz MD  Anesthesiology, CA-1     ====================================     TODAY'S PROGRESS:   SUBJECTIVE:   Abdomen closed 11/19/21.   OBJECTIVE:   1. VITAL SIGNS:   Temp:  [96.8  F (36  C)-99.3  F (37.4  C)] 99.3  F (37.4  C)  Pulse:  [55-80] 68  Resp:  [14] 14  BP: (115)/(58) 115/58  MAP:  [63 mmHg-214 mmHg] 77 mmHg  Arterial Line BP:  ()/() 108/54  FiO2 (%):  [30 %-40 %] 30 %  SpO2:  [97 %-100 %] 99 %  Ventilation Mode: (S) CPAP/PS  (Continuous positive airway pressure with Pressure Support)  FiO2 (%): 30 %  Rate Set (breaths/minute): 14 breaths/min  Tidal Volume Set (mL): 450 mL  PEEP (cm H2O): 5 cmH2O  Pressure Support (cm H2O): 7 cmH2O  Oxygen Concentration (%): 30 %  Resp: 14       2. INTAKE/ OUTPUT:   I/O last 3 completed shifts:  In: 4072.54 [I.V.:2452.54; NG/GT:150]  Out: 1527 [Urine:682; Emesis/NG output:75; Drains:720; Blood:50]     3. PHYSICAL EXAMINATION:      General: comfortable in bed  Neuro: AAOx4  Resp: CTABL  CV: RRR, S1S2 WNL  Abdomen: distended, soft    Extremities: warm and well perfused. 2+ edema  Skin: no rashes or jaundice     4. INVESTIGATIONS:  All labs and imaging studies personally reviewed     Arterial Blood Gases          Recent Labs   Lab 11/20/21  0508 11/20/21  0144 11/19/21  1941 11/19/21  1458   PH 7.48* 7.45 7.42 7.43   PCO2 25* 25* 30* 30*   PO2 172* 177* 158* 125*   HCO3 18* 17* 20* 20*      Complete Blood Count               Recent Labs   Lab 11/20/21  0507 11/19/21  1942 11/19/21  1341 11/19/21  1221 11/19/21  1005 11/19/21  0533 11/19/21  0127 11/18/21  2334 11/18/21  2156   WBC 17.5*  --   --   --   --  16.6* 16.6*  --  20.7*   HGB 7.4* 8.0* 8.6* 7.6*   < > 8.4* 9.4*   < > 10.3*   PLT 52*  --   --   --   --  61* 78*  --  95*    < > = values in this interval not displayed.      Basic Metabolic Panel                    Recent Labs   Lab 11/20/21  1315 11/20/21  1043 11/20/21  0806 11/20/21  0507 11/20/21  0016 11/19/21  2305 11/19/21  1601 11/19/21  1459 11/19/21  1451 11/19/21  1341 11/19/21  1221 11/19/21  0541 11/19/21  0533 11/18/21  1559 11/18/21  1446   NA  --   --   --  137  --   --   --  138  --  136 137  --  140  --  143   POTASSIUM  --   --   --  3.7  --  3.4  --  3.5  --  3.7 3.7  --  3.6   < > 3.2*   CHLORIDE  --   --   --  110*  --   --   --  111*  --   --   --   --  111*  --  110*    CO2  --   --   --  16*  --   --   --  20  --   --   --   --  22  --  26   BUN  --   --   --  27  --   --   --  24  --   --   --   --  21  --  14   CR  --   --   --  1.65*  --   --   --  1.16*  --   --   --   --  0.89  --  0.55   * 93 154* 176*   < >  --    < > 109*   < > 127* 117*   < > 128*   < > 128*    < > = values in this interval not displayed.      Liver Function Tests            Recent Labs   Lab 11/20/21  0507 11/20/21  0146 11/19/21  1459 11/19/21  0533 11/19/21  0126 11/18/21  2156 11/18/21  1446   AST 36  --  69* 98*  --   --  280*  280*   ALT 92*  --  117* 140*  --   --  200*  200*   ALKPHOS 33*  --  34* 36*  --   --  41  41   BILITOTAL 0.9  --  1.2 1.3  --   --  3.1*  3.1*   ALBUMIN 2.3*  --  2.4* 2.5*  --   --  1.6*  1.6*   INR  --  1.57*  --  1.63* 1.63* 1.76* 1.97*      Pancreatic Enzymes        Recent Labs   Lab 11/19/21  0533 11/18/21  1446 11/17/21  1401   LIPASE 83 156  --    AMYLASE 36 31 81      Coagulation Profile             Recent Labs   Lab 11/20/21  0508 11/20/21  0146 11/19/21  2305 11/19/21  1942 11/19/21  1005 11/19/21  0533 11/19/21  0126 11/18/21  2156   INR  --  1.57*  --   --   --  1.63* 1.63* 1.76*   PTT 33 33 33 34   < > 33 34 33    < > = values in this interval not displayed.            5. RADIOLOGY:       Recent Results (from the past 24 hour(s))   XR Abdomen Port 1 View     Narrative     Exam: XR ABDOMEN PORT 1 VIEWS, 11/19/2021 4:49 PM     Indication: Evaluate FT     Comparison: Same day     Findings:   Enteric tube distal tip projects over the distal stomach. The feeding  tube is slightly further near the level of the pylorus/proximal  duodenum. Nonobstructive bowel gas pattern. No portal venous gas.  Right upper quadrant drains and biliary stent in place. [place]-Amara  catheter tip projects over the main pulmonary artery.        Impression     Impression: Enteric tube distal tip projects over the distal stomach.  Feeding tube is slightly further near the level of  the  pylorus/proximal duodenum with redundant tubing in the gastric  fundus/body.     I have personally reviewed the examination and initial interpretation  and I agree with the findings.     ERMAEVANGELINA DEWITT DO JONATAN         SYSTEM ID:  [id number]   US Liver Transplant Follow Up Portable     Narrative     EXAMINATION: US LIVER TRANSPLANT FOLLOW UP PORTABLE, 11/19/2021 6:34  PM      COMPARISON: 11/18/2021     HISTORY: Liver transplant status post closure     TECHNIQUE:  Gray-scale, color Doppler and spectral flow analysis.     FINDINGS:      Liver:   The liver demonstrates normal homogeneous echotexture. No  evidence of a focal hepatic mass.      Bile Ducts: No intrahepatic biliary dilation. Common bile duct not  seen.     Gallbladder: The gallbladder is surgically absent.     Kidneys:   Right kidney:  The right kidney demonstrates normal echotexture with  no evidence of a shadowing stone, focal mass or hydronephrosis.   11.1  cm in long axis dimension.     Pancreas: Visualized portions of the pancreas are normal in  appearance.     Visualized portions of the aorta are unremarkable.     LIVER DOPPLER:  Splenic vein:  Patent continuous normal antegrade direction flow  towards the liver, 34 cm/sec.  Extrahepatic portal vein:  Patent continuous antegrade flow, 80  cm/sec.  Portal vein at anastomosis: Patent continuous antegrade flow, 167  cm/sec.  Intrahepatic portal vein:  Patent continuous antegrade flow, 160  cm/sec.  Right portal vein flow is antegrade, measuring 95 cm/sec.  Left portal vein flow is antegrade, measuring 98 cm/sec.     Inferior vena cava: patent with flow toward the heart throughout..  IVC above anastomosis:  79 cm/sec.  IVC at anastomosis:  56 cm/sec.  Intrahepatic IVC:  66 cm/sec.  Extrahepatic IVC:  67 cm/sec.     Right, mid, left hepatic veins: Patent with flow towards the inferior  vena cava.     Extrahepatic hepatic artery: Low resistance waveform with flow towards  the liver. 50 cm/sec with resistive  index 0.84. Previously 134 cm/s.  Right hepatic artery: 29 cm/sec with resistive index 0.72. Previously  46.1  Left hepatic artery: 40 cm/sec with resistive index 0.78. Not  previously seen.        Impression     Impression:   1. Normal grayscale appearance of the hepatic transplant.  2. Patent Doppler evaluation. Velocities of the hepatic artery and  branches are slower than prior exam which is nonspecific. All flow  remains antegrade throughout the arterial and portal system.     I have personally reviewed the examination and initial interpretation  and I agree with the findings.     RONN CHEUNG DO         SYSTEM ID:  [id number]         =========================================         Cosigned by: Aguila Terry MD at 11/20/2021  6:26 PM       Revision History                                    Impression     Impression:   1. Normal grayscale appearance of the hepatic transplant.  2. Patent Doppler evaluation. Velocities of the hepatic artery and  branches are slower than prior exam which is nonspecific. All flow  remains antegrade throughout the arterial and portal system.     I have personally reviewed the examination and initial interpretation  and I agree with the findings.     RONN CHEUNG DO         SYSTEM ID:  [id number]         =========================================         Cosigned by: Aguila Terry MD at 11/20/2021  6:26 PM       Revision History

## 2021-11-22 ENCOUNTER — APPOINTMENT (OUTPATIENT)
Dept: OCCUPATIONAL THERAPY | Facility: CLINIC | Age: 56
DRG: 005 | End: 2021-11-22
Payer: COMMERCIAL

## 2021-11-22 ENCOUNTER — APPOINTMENT (OUTPATIENT)
Dept: GENERAL RADIOLOGY | Facility: CLINIC | Age: 56
DRG: 005 | End: 2021-11-22
Attending: SURGERY
Payer: COMMERCIAL

## 2021-11-22 ENCOUNTER — APPOINTMENT (OUTPATIENT)
Dept: PHYSICAL THERAPY | Facility: CLINIC | Age: 56
DRG: 005 | End: 2021-11-22
Attending: SURGERY
Payer: COMMERCIAL

## 2021-11-22 LAB
ALBUMIN SERPL-MCNC: 1.9 G/DL (ref 3.4–5)
ALP SERPL-CCNC: 46 U/L (ref 40–150)
ALT SERPL W P-5'-P-CCNC: 62 U/L (ref 0–50)
APTT PPP: 29 SECONDS (ref 22–38)
AST SERPL W P-5'-P-CCNC: 12 U/L (ref 0–45)
BACTERIA BLD CULT: NO GROWTH
BACTERIA BLD CULT: NO GROWTH
BACTERIA FLD CULT: NO GROWTH
BASOPHILS # BLD AUTO: 0 10E3/UL (ref 0–0.2)
BASOPHILS NFR BLD AUTO: 0 %
BILIRUB DIRECT SERPL-MCNC: 0.6 MG/DL (ref 0–0.2)
BILIRUB SERPL-MCNC: 0.9 MG/DL (ref 0.2–1.3)
CA-I BLD-MCNC: 4.6 MG/DL (ref 4.4–5.2)
CREAT SERPL-MCNC: 1.53 MG/DL (ref 0.52–1.04)
EOSINOPHIL # BLD AUTO: 0 10E3/UL (ref 0–0.7)
EOSINOPHIL NFR BLD AUTO: 0 %
ERYTHROCYTE [DISTWIDTH] IN BLOOD BY AUTOMATED COUNT: 17.2 % (ref 10–15)
FIBRINOGEN PPP-MCNC: 465 MG/DL (ref 170–490)
GFR SERPL CREATININE-BSD FRML MDRD: 37 ML/MIN/1.73M2
GLUCOSE BLDC GLUCOMTR-MCNC: 106 MG/DL (ref 70–99)
GLUCOSE BLDC GLUCOMTR-MCNC: 118 MG/DL (ref 70–99)
GLUCOSE BLDC GLUCOMTR-MCNC: 125 MG/DL (ref 70–99)
GLUCOSE BLDC GLUCOMTR-MCNC: 131 MG/DL (ref 70–99)
GLUCOSE BLDC GLUCOMTR-MCNC: 133 MG/DL (ref 70–99)
GLUCOSE BLDC GLUCOMTR-MCNC: 137 MG/DL (ref 70–99)
GLUCOSE BLDC GLUCOMTR-MCNC: 149 MG/DL (ref 70–99)
GLUCOSE BLDC GLUCOMTR-MCNC: 151 MG/DL (ref 70–99)
GLUCOSE BLDC GLUCOMTR-MCNC: 152 MG/DL (ref 70–99)
GLUCOSE BLDC GLUCOMTR-MCNC: 154 MG/DL (ref 70–99)
GLUCOSE BLDC GLUCOMTR-MCNC: 93 MG/DL (ref 70–99)
GLUCOSE BLDC GLUCOMTR-MCNC: 98 MG/DL (ref 70–99)
HBV DNA SERPL QL NAA+PROBE: NORMAL
HCT VFR BLD AUTO: 25.1 % (ref 35–47)
HCV RNA SERPL QL NAA+PROBE: NORMAL
HGB BLD-MCNC: 8.4 G/DL (ref 11.7–15.7)
HIV1+2 RNA SERPL QL NAA+PROBE: NORMAL
IMM GRANULOCYTES # BLD: 0.1 10E3/UL
IMM GRANULOCYTES NFR BLD: 1 %
INR PPP: 1.22 (ref 0.85–1.15)
LACTATE SERPL-SCNC: 0.9 MMOL/L (ref 0.7–2)
LACTATE SERPL-SCNC: 1 MMOL/L (ref 0.7–2)
LYMPHOCYTES # BLD AUTO: 0.4 10E3/UL (ref 0.8–5.3)
LYMPHOCYTES NFR BLD AUTO: 2 %
MAGNESIUM SERPL-MCNC: 1.8 MG/DL (ref 1.6–2.3)
MCH RBC QN AUTO: 29 PG (ref 26.5–33)
MCHC RBC AUTO-ENTMCNC: 33.5 G/DL (ref 31.5–36.5)
MCV RBC AUTO: 87 FL (ref 78–100)
MONOCYTES # BLD AUTO: 0.5 10E3/UL (ref 0–1.3)
MONOCYTES NFR BLD AUTO: 3 %
NEUTROPHILS # BLD AUTO: 15.6 10E3/UL (ref 1.6–8.3)
NEUTROPHILS NFR BLD AUTO: 94 %
NRBC # BLD AUTO: 0 10E3/UL
NRBC BLD AUTO-RTO: 0 /100
PATH REPORT.COMMENTS IMP SPEC: NORMAL
PATH REPORT.COMMENTS IMP SPEC: NORMAL
PATH REPORT.FINAL DX SPEC: NORMAL
PATH REPORT.GROSS SPEC: NORMAL
PATH REPORT.MICROSCOPIC SPEC OTHER STN: NORMAL
PATH REPORT.MICROSCOPIC SPEC OTHER STN: NORMAL
PATH REPORT.RELEVANT HX SPEC: NORMAL
PHOSPHATE SERPL-MCNC: 3.7 MG/DL (ref 2.5–4.5)
PHOTO IMAGE: NORMAL
PLATELET # BLD AUTO: 67 10E3/UL (ref 150–450)
POTASSIUM BLD-SCNC: 3.2 MMOL/L (ref 3.4–5.3)
PROT SERPL-MCNC: 4.4 G/DL (ref 6.8–8.8)
RBC # BLD AUTO: 2.9 10E6/UL (ref 3.8–5.2)
TACROLIMUS BLD-MCNC: 6.1 UG/L (ref 5–15)
TME LAST DOSE: NORMAL H
TME LAST DOSE: NORMAL H
WBC # BLD AUTO: 16.7 10E3/UL (ref 4–11)

## 2021-11-22 PROCEDURE — 36592 COLLECT BLOOD FROM PICC: CPT | Performed by: TRANSPLANT SURGERY

## 2021-11-22 PROCEDURE — 85384 FIBRINOGEN ACTIVITY: CPT | Performed by: PHYSICIAN ASSISTANT

## 2021-11-22 PROCEDURE — 84100 ASSAY OF PHOSPHORUS: CPT | Performed by: SURGERY

## 2021-11-22 PROCEDURE — 250N000013 HC RX MED GY IP 250 OP 250 PS 637: Performed by: STUDENT IN AN ORGANIZED HEALTH CARE EDUCATION/TRAINING PROGRAM

## 2021-11-22 PROCEDURE — 258N000003 HC RX IP 258 OP 636: Performed by: TRANSPLANT SURGERY

## 2021-11-22 PROCEDURE — 250N000011 HC RX IP 250 OP 636: Performed by: TRANSPLANT SURGERY

## 2021-11-22 PROCEDURE — 85025 COMPLETE CBC W/AUTO DIFF WBC: CPT | Performed by: SURGERY

## 2021-11-22 PROCEDURE — 250N000012 HC RX MED GY IP 250 OP 636 PS 637: Performed by: PHYSICIAN ASSISTANT

## 2021-11-22 PROCEDURE — 82040 ASSAY OF SERUM ALBUMIN: CPT | Performed by: SURGERY

## 2021-11-22 PROCEDURE — 250N000013 HC RX MED GY IP 250 OP 250 PS 637: Performed by: PHYSICIAN ASSISTANT

## 2021-11-22 PROCEDURE — 999N000065 XR ABDOMEN PORT 1 VIEWS

## 2021-11-22 PROCEDURE — 83605 ASSAY OF LACTIC ACID: CPT | Performed by: PHYSICIAN ASSISTANT

## 2021-11-22 PROCEDURE — 250N000013 HC RX MED GY IP 250 OP 250 PS 637

## 2021-11-22 PROCEDURE — 99207 PR SATISFY VISIT NUMBER: CPT | Performed by: TRANSPLANT SURGERY

## 2021-11-22 PROCEDURE — 74018 RADEX ABDOMEN 1 VIEW: CPT | Mod: 26 | Performed by: RADIOLOGY

## 2021-11-22 PROCEDURE — 36592 COLLECT BLOOD FROM PICC: CPT | Performed by: SURGERY

## 2021-11-22 PROCEDURE — 83605 ASSAY OF LACTIC ACID: CPT | Performed by: TRANSPLANT SURGERY

## 2021-11-22 PROCEDURE — 250N000012 HC RX MED GY IP 250 OP 636 PS 637: Performed by: SURGERY

## 2021-11-22 PROCEDURE — 80197 ASSAY OF TACROLIMUS: CPT | Performed by: SURGERY

## 2021-11-22 PROCEDURE — 84132 ASSAY OF SERUM POTASSIUM: CPT | Performed by: TRANSPLANT SURGERY

## 2021-11-22 PROCEDURE — 97530 THERAPEUTIC ACTIVITIES: CPT | Mod: GO

## 2021-11-22 PROCEDURE — 82330 ASSAY OF CALCIUM: CPT | Performed by: SURGERY

## 2021-11-22 PROCEDURE — 250N000013 HC RX MED GY IP 250 OP 250 PS 637: Performed by: TRANSPLANT SURGERY

## 2021-11-22 PROCEDURE — 85730 THROMBOPLASTIN TIME PARTIAL: CPT | Performed by: PHYSICIAN ASSISTANT

## 2021-11-22 PROCEDURE — 80076 HEPATIC FUNCTION PANEL: CPT | Performed by: SURGERY

## 2021-11-22 PROCEDURE — 250N000012 HC RX MED GY IP 250 OP 636 PS 637: Performed by: NURSE PRACTITIONER

## 2021-11-22 PROCEDURE — 97116 GAIT TRAINING THERAPY: CPT | Mod: GP | Performed by: PHYSICAL THERAPIST

## 2021-11-22 PROCEDURE — 120N000011 HC R&B TRANSPLANT UMMC

## 2021-11-22 PROCEDURE — 85610 PROTHROMBIN TIME: CPT | Performed by: SURGERY

## 2021-11-22 PROCEDURE — 83735 ASSAY OF MAGNESIUM: CPT | Performed by: SURGERY

## 2021-11-22 PROCEDURE — 250N000009 HC RX 250: Performed by: SURGERY

## 2021-11-22 PROCEDURE — 82565 ASSAY OF CREATININE: CPT | Performed by: TRANSPLANT SURGERY

## 2021-11-22 PROCEDURE — 97161 PT EVAL LOW COMPLEX 20 MIN: CPT | Mod: GP | Performed by: PHYSICAL THERAPIST

## 2021-11-22 PROCEDURE — 97535 SELF CARE MNGMENT TRAINING: CPT | Mod: GO

## 2021-11-22 PROCEDURE — 250N000013 HC RX MED GY IP 250 OP 250 PS 637: Performed by: SURGERY

## 2021-11-22 RX ORDER — MAGNESIUM SULFATE HEPTAHYDRATE 40 MG/ML
2 INJECTION, SOLUTION INTRAVENOUS ONCE
Status: COMPLETED | OUTPATIENT
Start: 2021-11-22 | End: 2021-11-22

## 2021-11-22 RX ORDER — MAGNESIUM OXIDE 400 MG/1
400 TABLET ORAL 2 TIMES DAILY
Status: DISCONTINUED | OUTPATIENT
Start: 2021-11-22 | End: 2021-11-22

## 2021-11-22 RX ORDER — POTASSIUM CHLORIDE 750 MG/1
20 TABLET, EXTENDED RELEASE ORAL ONCE
Status: COMPLETED | OUTPATIENT
Start: 2021-11-22 | End: 2021-11-22

## 2021-11-22 RX ADMIN — PREDNISONE 25 MG: 20 TABLET ORAL at 08:18

## 2021-11-22 RX ADMIN — TACROLIMUS 1.5 MG: 1 CAPSULE ORAL at 17:41

## 2021-11-22 RX ADMIN — MYCOPHENOLATE MOFETIL 750 MG: 250 CAPSULE ORAL at 08:17

## 2021-11-22 RX ADMIN — MYCOPHENOLATE MOFETIL 750 MG: 250 CAPSULE ORAL at 17:41

## 2021-11-22 RX ADMIN — Medication 400 MG: at 08:18

## 2021-11-22 RX ADMIN — TACROLIMUS 1.5 MG: 1 CAPSULE ORAL at 08:18

## 2021-11-22 RX ADMIN — POLYETHYLENE GLYCOL 3350 17 G: 17 POWDER, FOR SOLUTION ORAL at 08:19

## 2021-11-22 RX ADMIN — POTASSIUM CHLORIDE 20 MEQ: 750 TABLET, EXTENDED RELEASE ORAL at 08:17

## 2021-11-22 RX ADMIN — OXYCODONE HYDROCHLORIDE 5 MG: 5 TABLET ORAL at 04:27

## 2021-11-22 RX ADMIN — DOCUSATE SODIUM 50 MG AND SENNOSIDES 8.6 MG 2 TABLET: 8.6; 5 TABLET, FILM COATED ORAL at 08:17

## 2021-11-22 RX ADMIN — OXYCODONE HYDROCHLORIDE 5 MG: 5 TABLET ORAL at 08:17

## 2021-11-22 RX ADMIN — Medication 40 MG: at 17:41

## 2021-11-22 RX ADMIN — Medication 400 MG: at 20:57

## 2021-11-22 RX ADMIN — VANCOMYCIN HYDROCHLORIDE 750 MG: 1 INJECTION, POWDER, LYOPHILIZED, FOR SOLUTION INTRAVENOUS at 15:25

## 2021-11-22 RX ADMIN — HUMAN INSULIN 4 UNITS/HR: 100 INJECTION, SOLUTION SUBCUTANEOUS at 08:52

## 2021-11-22 RX ADMIN — Medication 1 PACKET: at 08:18

## 2021-11-22 RX ADMIN — VALGANCICLOVIR 450 MG: 450 TABLET, FILM COATED ORAL at 08:17

## 2021-11-22 RX ADMIN — ASPIRIN 325 MG ORAL TABLET 325 MG: 325 PILL ORAL at 08:18

## 2021-11-22 RX ADMIN — FLUCONAZOLE IN SODIUM CHLORIDE 200 MG: 2 INJECTION, SOLUTION INTRAVENOUS at 08:17

## 2021-11-22 RX ADMIN — MAGNESIUM SULFATE IN WATER 2 G: 40 INJECTION, SOLUTION INTRAVENOUS at 06:15

## 2021-11-22 RX ADMIN — OXYCODONE HYDROCHLORIDE 5 MG: 5 TABLET ORAL at 20:57

## 2021-11-22 RX ADMIN — Medication 15 ML: at 08:18

## 2021-11-22 RX ADMIN — SULFAMETHOXAZOLE AND TRIMETHOPRIM 1 TABLET: 400; 80 TABLET ORAL at 08:18

## 2021-11-22 RX ADMIN — Medication 40 MG: at 08:18

## 2021-11-22 ASSESSMENT — ACTIVITIES OF DAILY LIVING (ADL)
ADLS_ACUITY_SCORE: 13
ADLS_ACUITY_SCORE: 11
ADLS_ACUITY_SCORE: 13
ADLS_ACUITY_SCORE: 11
ADLS_ACUITY_SCORE: 12
ADLS_ACUITY_SCORE: 12
ADLS_ACUITY_SCORE: 13
ADLS_ACUITY_SCORE: 12
ADLS_ACUITY_SCORE: 13
ADLS_ACUITY_SCORE: 13
ADLS_ACUITY_SCORE: 12
ADLS_ACUITY_SCORE: 13
ADLS_ACUITY_SCORE: 13
ADLS_ACUITY_SCORE: 11
ADLS_ACUITY_SCORE: 12
ADLS_ACUITY_SCORE: 13
ADLS_ACUITY_SCORE: 12
ADLS_ACUITY_SCORE: 11
ADLS_ACUITY_SCORE: 11

## 2021-11-22 ASSESSMENT — MIFFLIN-ST. JEOR: SCORE: 1202.88

## 2021-11-22 NOTE — PLAN OF CARE
Problem: Fluid Imbalance (Liver Transplant)  Goal: Fluid Balance  Outcome: No Change     Major Shift Events:      No major events over night; hemodynamically stable/unchanged and is AOx4. Moderate pain managed with PRN oxy. Plan to eventually transfer to floor.    For vital signs and complete assessments, please see documentation flowsheets.

## 2021-11-22 NOTE — PROGRESS NOTES
SURGICAL ICU PROGRESS NOTE  11/22/2021        Date of Service (when I saw the patient): 11/22/2021    ASSESSMENT:  Berta Nava is a 56 year old female who presented on 11/17/2021 with PMH of primary sclerosing cholangitis s/p ERCP, cirrhosis c/b ascites who presents with HE/AMS for potential liver transplant. The patient was scheduled to get liver txp one month ago, however, upon induction of anesthesia, the patient went into unstable Vtach and the case was aborted.  EBL was 10 L; total 22 U of blood, 3 of platelets, 13 FFP, 2 cryo, crystalloid 8cc  and 2400cc of cellsaver. Her abdomen was temporally closed with VAC, returned to OR 11/19/21 for washout and closure. Self- Extubated 11/20/21.     CHANGES and MAJOR THINGS TODAY:   Transfer to     PLAN:    Neuro/ pain/ sedation:  # Postoperative pain management  - oxy and dilaudid     # Hepatic encephalopathy / AMS  - Hold PTA lactulose  - Allow re-establishment of night/day sleep wake cycle      Pulmonary care:   # postoperative respiratory support-- self extubated on 11/20/21   - Now on room air. Prn supplemental O2, keep saturation > 92%     Cardiovascular:    # Hx of Vtach  - Monitor hemodynamic status.   - Maintain MAP > 65     GI/Nutrition:   # Primary sclerosing cholangitis c/b strictures s/p ERCP  # S/P DDLT   # protein calorie malnutrition    - nutrition consulted for TF management: continuous feeds via NJ @ goal of 40cc/h  - Hold PTA omeprazole (auto sub for protonix)   - Hold ursodiol   - Hold sucralfate  - bowel regimen: senna/miralax      Renal/Fluids/ Electrolytes:   # hx of GONZALO  # hypervolemia   # Mixed metabolic acidosis - resolved  - furosemide gtt at 10 ml/hr held  - Hold PTA lasix and Spironolactone  - ICU electrolyte replacement protocol     Endocrine:    # steroid induced hyperglycemia   - insulin gtt for BS goal <180 while on steroid taper. Transition to subcutaneous insulin when stable      ID/ Antibiotics:  # suprarenal abscess--staph epi  (sensitive to Vancomycin)   # immunosuppression   - intraoperative cx from abscess with Staph epi--sensitive to Vancomycin  - Zosyn 48hr (completed)   - Fluconazole x5 days  ( will need to start oral nystatin once this course completes)     Immunoprophylaxis/immunosuppression:   - Cellcept/Tacrolimus per transplant   - Trimethoprim-sulfamethoxazole for PCP prophylaxis. Ganciclovir for CMV ppx      Heme:  # Anemia due to blood loss  # Coagulopathy  - Hemoglobin 6.8---> one unit PRBC this am,   - Monitor hgb; transfuse if Hgb < 7, replace coagulation products as needed      MSK:  # weakness and deconditioning of critical illness   - PT and OT consulted.   - OOB, to chair later       Prophylaxis:    - Mechanical prophylaxis for DVT.   - No chemical DVT prophylaxis due to high risk of bleeding.   - GI ppx: Protonic BID     Lines/ tubes/ drains:  - Ferguson  - Feeding tube   - internal jugular-right        Patient seen, findings and plan discussed with surgical ICU staff, Dr. Velasco.      Sandy Shah  ====================================  INTERVAL HISTORY:  Course reviewed. No acute events overnight. Has been ambulating. Pain well controlled with oxy. Passing gas, no BM yet but has the urge.  OBJECTIVE:   1. VITAL SIGNS:   Temp:  [97.6  F (36.4  C)-98  F (36.7  C)] 97.9  F (36.6  C)  Pulse:  [58-68] 68  Resp:  [14-18] 16  BP: (118-152)/(68-89) 152/89  SpO2:  [97 %-100 %] 99 %  Resp: 16      2. INTAKE/ OUTPUT:   I/O last 3 completed shifts:  In: 3890.33 [P.O.:600; I.V.:2040.33; NG/GT:290]  Out: 2630 [Urine:2325; Drains:305]    3. PHYSICAL EXAMINATION:  General: sitting in bed, NAD   HEENT: OP clear.  NJT in place with feeds infusing. Voice with some hoarseness   Neuro: A&O. RYAN   Pulm/Resp: Clear breath sounds bilaterally without rhonchi, crackles or wheezes, on room air   CV: RRR, S1/S2   Abdomen: incision dressed, ROMA drain x2 with minimal serosangious fluid in bulbs.   : (+) ferguson catheter in place, urine yellow and  clear  MSK/Extremities: no peripheral edema, moving all extremities, peripheral pulses intact, calves soft, extremities well perfused, 2+.     4. INVESTIGATIONS:   Arterial Blood Gases   Recent Labs   Lab 11/20/21  0508 11/20/21  0144 11/19/21  1941 11/19/21  1458   PH 7.48* 7.45 7.42 7.43   PCO2 25* 25* 30* 30*   PO2 172* 177* 158* 125*   HCO3 18* 17* 20* 20*     Complete Blood Count   Recent Labs   Lab 11/22/21  0419 11/21/21  1738 11/21/21  0318 11/20/21  0507   WBC 16.7* 16.9* 14.3* 17.5*   HGB 8.4* 8.7* 6.8* 7.4*   PLT 67* 61* 39* 52*     Basic Metabolic Panel  Recent Labs   Lab 11/22/21  0932 11/22/21  0816 11/22/21  0622 11/22/21  0423 11/22/21  0419 11/21/21  1742 11/21/21  1738 11/21/21  0331 11/21/21  0318 11/20/21  0806 11/20/21  0507 11/19/21  1601 11/19/21  1459   NA  --   --   --   --   --   --  130*  --  134  --  137  --  138   POTASSIUM  --   --   --   --  3.2*  --  3.2*  --  2.8*  --  3.7   < > 3.5   CHLORIDE  --   --   --   --   --   --  103  --  105  --  110*  --  111*   CO2  --   --   --   --   --   --  19*  --  21  --  16*  --  20   BUN  --   --   --   --   --   --  43*  --  39*  --  27  --  24   CR  --   --   --   --  1.53*  --  1.70*  --  1.64*  --  1.65*  --  1.16*   * 131* 133* 133*  --    < > 151*   < > 135*   < > 176*   < > 109*    < > = values in this interval not displayed.     Liver Function Tests  Recent Labs   Lab 11/22/21 0419 11/21/21 1738 11/21/21  0318 11/20/21  1752 11/20/21  0507 11/20/21  0146 11/19/21  1459 11/19/21  0533   AST 12 16 22 28   < >  --    < > 98*   ALT 62* 73* 79* 84*   < >  --    < > 140*   ALKPHOS 46 48 37* 37*   < >  --    < > 36*   BILITOTAL 0.9 0.9 0.8 1.0   < >  --    < > 1.3   ALBUMIN 1.9* 2.0* 2.0* 2.1*   < >  --    < > 2.5*   INR 1.22*  --  1.34*  --   --  1.57*  --  1.63*    < > = values in this interval not displayed.     Pancreatic Enzymes  Recent Labs   Lab 11/19/21  0533 11/18/21  1446 11/17/21  1401   LIPASE 83 156  --    AMYLASE 36 31 81      Coagulation Profile  Recent Labs   Lab 11/22/21  0419 11/21/21  0318 11/20/21  0508 11/20/21  0146 11/19/21  1005 11/19/21  0533   INR 1.22* 1.34*  --  1.57*  --  1.63*   PTT 29 31 33 33   < > 33    < > = values in this interval not displayed.         5. RADIOLOGY:   No results found for this or any previous visit (from the past 24 hour(s)).    =========================================

## 2021-11-22 NOTE — PROGRESS NOTES
Transplant Surgery  Inpatient Daily Progress Note  2021    Assessment & Plan: Berta Nava is a 56 year old female with PMH significant for PSC/UC, Sjogren's syndrome, HTN, liver transplant aborted in 2021 d/t V tach. S/p  donor liver transplant with biliary stent on 21, complicated by coagulopathy and finding of intra-abdominal abscess. Return to OR 21 for washout and abdominal closure.    Graft function: DDLTx 21 POD #4/3 from washout/closure. Transaminases improving, TB normalized, 0.9  - US: left hepatic artery and left portal vein not visualized due to limited sonographic windows. Repeat US post closure:normal, patent doppler .  Immunosuppression management: Induction w/ steroids.  -Tacro 1.5 mg BID, titrate to a level goal 8-10. Monitor daily in setting of fluconazole/  lvele 6.1 (11 hour trough) after 3 doses, no change as Diflucan was added on .   -MMF 750mg BID   -Steroid taper per protocol.  Complexity of management: High. Contributing factors: induction.  Hematology:   ACD/ABL: Hgb 8.4<-8.7  Thrombocytopenia, acute: Plt 67, down from >300 on admission. Monitor. No transfusion  Coagulopathy: INR 1.2, fib 465. Monitor.  Cardiorespiratory:   Circulatory failure/ Hypotension: weaned off pressors .   Post-op mechanical ventilation: Extubated . RA. Continue CDB/IS  GI/Nutrition:   Diet: Regular diet as tolerated. FT placed, currently in stomach-will need to advance to post pyloric, radiology to attempt again today.   Post op ileus: Continue bowel regimen  Endocrine:   Steroid induced hyperglycemia: Hgb A1c 5.4, -170, Continue insulin gtt.  Fluid/Electrolytes: Hypervolemia : +5Kg, initialized diuresis with Lasix gtt 10/hr +metolazone 5 with good results, now discontinued.   GONZALO: Cr 1.5<-1.7, but up from ~0.9. Monitor. Good UOP  Hypokalemia: K 3.2, replace  : Jurado to be removed today.   Infectious disease: Afebrile  Leukocytosis WBC  16.7<-17.5 (16.6)  Intra abdominal abscess: Noted in OR on 11/18. Gram stain + GPC. Culture Staph epi. On vanco. Continue zosyn x4 days post closure. Continue fluconazole.   Prophylaxis: DVT(mechanical), fall, GI (PPI), Fungal ppx (Diflucan) CMV ppx (gancyclovir), PCP ppx (bactrim)  Disposition: SICU    Medical Decision Making: High  Subsequent visit 77327 (high level decision making)    [place]/Fellow/Resident Provider: Jennifer Valentin PA-C [id number]    Faculty: Nasir Rojas M.D.  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    11/18/2021 (Liver), Postoperative day: 4     Interval History:  Obtain from the patient     Overnight events: NAEs. Reports pain with activity. No Nausea.      ROS:   A 10-point review of systems was negative except as noted above.    Curent Meds:    aspirin  325 mg Oral or Feeding Tube Daily     fluconazole  200 mg Intravenous Q24H     magnesium oxide  400 mg Oral BID     multivitamins w/minerals  15 mL Per Feeding Tube Daily     mycophenolate  750 mg Oral BID IS    Or     mycophenolate  750 mg Oral or NG Tube BID IS     pantoprazole  40 mg Oral BID AC     polyethylene glycol  17 g Oral Daily     [START ON 11/23/2021] predniSONE  10 mg Oral Once     protein modular  1 packet Per Feeding Tube Daily     senna-docusate  1 tablet Oral BID    Or     senna-docusate  2 tablet Oral BID     sodium chloride (PF)  3 mL Intravenous Q8H     sulfamethoxazole-trimethoprim  1 tablet Oral Daily     tacrolimus  1.5 mg Oral BID IS    Or     tacrolimus  1.5 mg Oral BID IS     valGANciclovir  450 mg Oral Every Other Day    Or     valGANciclovir  450 mg Oral or NG Tube Every Other Day     vancomycin  750 mg Intravenous Q24H       Physical Exam:     Admit Weight: 57.6 kg (127  "lb)    Current Vitals:   BP (!) 146/79 (BP Location: Left arm)   Pulse 65   Temp 97.8  F (36.6  C) (Oral)   Resp 14   Ht 1.651 m (5' 5\")   Wt 62.2 kg (137 lb 2 oz)   LMP  (LMP Unknown)   SpO2 99%   BMI 22.82 kg/m      CVP (mmHg): 5 mmHg    Vital sign ranges:    Temp:  [97.8  F (36.6  C)-98  F (36.7  C)] 97.8  F (36.6  C)  Pulse:  [59-68] 65  Resp:  [14-18] 14  BP: (118-152)/(68-89) 146/79  SpO2:  [97 %-100 %] 99 %  Patient Vitals for the past 24 hrs:   BP Temp Temp src Pulse Resp SpO2 Weight   11/22/21 1500 -- -- -- -- -- -- 62.2 kg (137 lb 2 oz)   11/22/21 1200 (!) 146/79 97.8  F (36.6  C) Oral 65 14 99 % --   11/22/21 1000 (!) 152/89 -- -- 68 16 99 % --   11/22/21 0900 (!) 142/82 -- -- 65 14 100 % --   11/22/21 0800 131/80 -- Oral 67 16 98 % --   11/22/21 0700 118/68 -- -- 66 16 97 % --   11/22/21 0600 (!) 140/76 -- -- 65 16 97 % --   11/22/21 0500 134/68 -- -- 63 16 98 % --   11/22/21 0400 (!) 141/76 97.9  F (36.6  C) Axillary 65 16 98 % --   11/22/21 0300 (!) 143/83 -- -- 61 16 98 % --   11/22/21 0200 (!) 147/79 -- -- 62 16 98 % --   11/22/21 0100 139/76 -- -- 61 16 98 % --   11/22/21 0000 137/73 -- -- 63 16 98 % --   11/21/21 2300 (!) 141/76 -- -- 62 16 98 % --   11/21/21 2200 132/75 -- -- 61 18 98 % --   11/21/21 2100 131/80 -- -- 65 16 98 % --   11/21/21 2000 134/75 98  F (36.7  C) Axillary 63 16 99 % --   11/21/21 1900 133/71 -- -- 62 16 99 % --   11/21/21 1800 135/68 -- -- 63 16 100 % --   11/21/21 1700 (!) 145/78 -- -- 64 -- 100 % --   11/21/21 1600 131/69 97.8  F (36.6  C) Oral 59 16 99 % --     General Appearance: NAD  Skin: warm, dry, no jaundice. Scattered ecchymosis. See abd exam  Heart: regular rate and rhythm  Lungs: NLB on RA  Abdomen: soft, no guarding, incision with sutures intact with minimal s/s drainage at trifurcation. ROMA x2 s/s output.   : Jurado present with clear yellow UOP  Extremities: edema: BRIGHT +2 bilaterally  Neurologic: A0x3, awake.     Frailty Scores     Frailty Scores " 3/23/2021 3/13/2021    Final Score Not Frail Not Frail    Final Score Number 0 0          Data:   CMP  Recent Labs   Lab 11/22/21  1345 11/22/21  1234 11/22/21  0423 11/22/21  0419 11/22/21  0418 11/21/21  1742 11/21/21  1738 11/21/21  0331 11/21/21  0318 11/19/21  0541 11/19/21  0533 11/18/21  1559 11/18/21  1446   NA  --   --   --   --   --   --  130*  --  134   < > 140  --  143   POTASSIUM  --   --   --  3.2*  --   --  3.2*  --  2.8*   < > 3.6   < > 3.2*   CHLORIDE  --   --   --   --   --   --  103  --  105   < > 111*  --  110*   CO2  --   --   --   --   --   --  19*  --  21   < > 22  --  26   * 125*   < >  --   --    < > 151*   < > 135*   < > 128*   < > 128*   BUN  --   --   --   --   --   --  43*  --  39*   < > 21  --  14   CR  --   --   --  1.53*  --   --  1.70*  --  1.64*   < > 0.89  --  0.55   GFRESTIMATED  --   --   --  37*  --   --  33*  --  34*   < > 72  --  >90   MARIELENA  --   --   --   --   --   --  7.6*  --  7.5*   < > 8.6  --  9.3   ICAW  --   --   --   --  4.6  --   --   --  4.6   < > 4.9  --   --    MAG  --   --   --  1.8  --   --   --   --  1.9   < > 1.8   < > 1.7   PHOS  --   --   --  3.7  --   --   --   --  4.8*   < > 3.9   < > 2.7   AMYLASE  --   --   --   --   --   --   --   --   --   --  36  --  31   LIPASE  --   --   --   --   --   --   --   --   --   --  83  --  156   ALBUMIN  --   --   --  1.9*  --   --  2.0*  --  2.0*   < > 2.5*  --  1.6*  1.6*   BILITOTAL  --   --   --  0.9  --   --  0.9  --  0.8   < > 1.3  --  3.1*  3.1*   ALKPHOS  --   --   --  46  --   --  48  --  37*   < > 36*  --  41  41   AST  --   --   --  12  --   --  16  --  22   < > 98*  --  280*  280*   ALT  --   --   --  62*  --   --  73*  --  79*   < > 140*  --  200*  200*    < > = values in this interval not displayed.     CBC  Recent Labs   Lab 11/22/21  0419 11/21/21  1738 11/19/21  0127 11/19/21  0126   HGB 8.4* 8.7*   < >  --    WBC 16.7* 16.9*   < >  --    PLT 67* 61*   < >  --    A1C  --   --   --  5.4    < > =  values in this interval not displayed.     COAGS  Recent Labs   Lab 11/22/21  0419 11/21/21  0318   INR 1.22* 1.34*   PTT 29 31      Urinalysis  Recent Labs   Lab Test 11/20/21  1030 11/06/21  0509 06/10/21  0632 05/21/21  0730   COLOR Yellow Dark Yellow*   < > Dark Yellow   APPEARANCE Clear Clear   < > Clear   URINEGLC Negative Negative   < > Negative   URINEBILI Negative Moderate*   < > Negative   URINEKETONE Negative Negative   < > Negative   SG 1.020 1.018   < > 1.031   UBLD Small* Negative   < > Negative   URINEPH 5.0 5.5   < > 6.0   PROTEIN 30 * Negative   < > 20*   NITRITE Negative Negative   < > Negative   LEUKEST Negative Negative   < > Negative   RBCU 7* <1   < > 1   WBCU 3 1   < > 4   UTPG  --   --   --  0.26*    < > = values in this interval not displayed.     Virology:  Hepatitis C Antibody   Date Value Ref Range Status   11/17/2021 Nonreactive Nonreactive Final

## 2021-11-22 NOTE — PROGRESS NOTES
Care Management Follow Up    Length of Stay (days): 5    Expected Discharge Date:       Concerns to be Addressed:       Patient plan of care discussed at interdisciplinary rounds: Yes    Anticipated Discharge Disposition:  Home with home care     Anticipated Discharge Services:  Home care RN   Anticipated Discharge DME:      Patient/family educated on Medicare website which has current facility and service quality ratings:    Education Provided on the Discharge Plan:    Patient/Family in Agreement with the Plan:      Referrals Placed by CM/SW:  [facility]  Private pay costs discussed: Not applicable    Additional Information:  Talked to patient at bedside to discuss home care options. Therapy suggested home PT for patient. She stated that she has enough help at home from her  and daughter and she thinks that she will be strong enough to do therapy on her own, and declined services from PT/OT. Writer also explained that she will need lab draws for her liver transplant and would benefit from a RN from home care. She agreed to RN services. Writer made referrals to [facility] for RN services, awaiting to hear for acceptance. Will continue to follow up.    Update:  [facility] has accepted this patient for home care services for RN. Please up date them closer to discharge.     [facility]  [address] [place] [id number]  Phone [phone]   Fax [phone]      Adelita Chakraborty  RN Care Coordinator   [facility]  [phone]             Adelita Chakraborty, RN

## 2021-11-22 NOTE — PROCEDURES
Small Bowel Feeding Tube Reposition  Reason for Feeding Tube Reposition: Reposition to post-pyloric position per Liver Transplant team.   Cortrak Start Time: 1150  Cortrak End Time: 1205  Medicine Delivered During Procedure: None  Reposition Successful: Yes, presumed post-pyloric pending AXR confirmation  Procedure Complications: Repeated coiling of FT - finally redirectable.  Final Placement Clya at exit of nare: 88 cm  Face to Face time with patient: 20 minutes total with setup/cleanup    Meenakshi Flowers RD, LD  Pager: [phone]

## 2021-11-22 NOTE — PROGRESS NOTES
11/22/21 1310   Quick Adds   Type of Visit Initial PT Evaluation   Living Environment   People in home spouse   Current Living Arrangements house   Home Accessibility stairs to enter home;stairs within home   Number of Stairs, Main Entrance 2   Stair Railings, Main Entrance none   Number of Stairs, Within Home, Primary 8   Stair Railings, Within Home, Primary railing on right side (ascending)   Transportation Anticipated family or friend will provide   Living Environment Comments Patient lives with spouse in 3 level home with basement, only needs to access main level and 2nd level where bedroom/bathroom are located. Has very supportive family,  and daughter able to provide 24hr assist if needed.   Self-Care   Usual Activity Tolerance good   Current Activity Tolerance moderate   Regular Exercise Yes   Activity/Exercise Type walking   Exercise Amount/Frequency 3-5 times/wk   Activity/Exercise/Self-Care Comment Patient is IND with all mobillity and self cares at baseline. 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   Disability/Function   Difficulty Communicating no   Difficulty Eating/Swallowing no   Walking or Climbing Stairs Difficulty no   Dressing/Bathing Difficulty no   Toileting issues no   Doing Errands Independently Difficulty (such as shopping) no   Fall history within last six months no   Change in Functional Status Since Onset of Current Illness/Injury yes   General Information   Onset of Illness/Injury or Date of Surgery 11/17/21   Referring Physician Alan Morales MD   Patient/Family Therapy Goals Statement (PT) Return home   Pertinent History of Current Problem (include personal factors and/or comorbidities that impact the POC) Patient is IND with all mobillity and self cares at baseline. 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   Existing Precautions/Restrictions abdominal   General Observations RA, VSS during session.    Cognition   Orientation Status (Cognition) oriented x 4   Affect/Mental  Status (Cognition) WFL   Follows Commands (Cognition) WFL   Strength Comprehensive (MMT)   Comment, General Manual Muscle Testing (MMT) Assessment Demos antigravity LE strength, generalized weakness with slow mobility and requiring assist for transfers and gait.    Bed Mobility   Comment (Bed Mobility) Not assessed at this time, reports she required assist today.    Transfers   Transfer Safety Comments CGA sit<>stand   Gait/Stairs (Locomotion)   Comment (Gait/Stairs) Ambulates 50 ft with FWW and CGA x1, slow and shuffled gait, slight unsteadiness at times, unable to let go of FWW, small LOB but does self correct.    Balance   Balance Comments Requires FWW for static/dynamic balance.    Clinical Impression   Criteria for Skilled Therapeutic Intervention yes, treatment indicated   PT Diagnosis (PT) Impaired functional mobility   Influenced by the following impairments deconditioning, weakness, impaired balance   Functional limitations due to impairments ambulation, bed mobility, stair negotiation   Clinical Presentation Evolving/Changing   Clinical Presentation Rationale clinical judgement.    Clinical Decision Making (Complexity) moderate complexity   Therapy Frequency (PT) 4x/week   Predicted Duration of Therapy Intervention (days/wks) 1-2 weeks   Planned Therapy Interventions (PT) balance training;bed mobility training;gait training;home exercise program;neuromuscular re-education;stair training;strengthening   Risk & Benefits of therapy have been explained evaluation/treatment results reviewed;care plan/treatment goals reviewed;risks/benefits reviewed;current/potential barriers reviewed;participants voiced agreement with care plan;participants included;patient   PT Discharge Planning    PT Discharge Recommendation (DC Rec) home with assist;home with home care physical therapy   PT Rationale for DC Rec Patient below baseline independent mobility but is progressing well. Anticipate with continued progress she will  be able to DC home with family support, may benefit from home care therapy upon DC.    PT Brief overview of current status  1A FWW   Total Evaluation Time   Total Evaluation Time (Minutes) 5

## 2021-11-22 NOTE — PROGRESS NOTES
CLINICAL NUTRITION SERVICES - BRIEF NOTE      Reason for RD note: Following up on nutrition POC.    New Findings/Chart Review:  Nutrition support: TwoCal HN @ 40 mL/hr (960 mL/day) + 1 pkt Prosource daily to provide 1960 kcals (35 kcal/kg/day), 92 g PRO (1.6 g/kg/day), 672 mL H2O, 210 g CHO and 5 g Fiber daily.   --TF at goal rate.    Enteral Access: NG/DT (AXR) - repo appears post-pyloric pending AXR verification    Oral Diet/Intake: Regular diet - pt asking for Ensure drinks and had tolerated half an omelet this AM.    Interventions:  -Repo of FT to post-pyloric - see Procedure note for details.  -Ordered Kcal cts 11/23-11/25  -Ordered Ensure Enlive Vanilla TID between meals per pt preference  -Nutrition education: Educated on minimum kcal and protein intake needed prior to removal of FT (1300 kcal, 60 g protein daily). Encouraged high protein foods for healing. Did not discuss post-transplant diet in detail d/t pt had other specialties who needed to visit.    Future/Additional Recommendations:  Recommend pt on average meets at least 75% minimum assessed needs (~1300 kcal, 60 g pro) to prior to discontinuation of EN support/removal of FT.     Nutrition will continue to follow per protocol.    Meenakshi Flowers RD, LD  Pager: [phone]

## 2021-11-23 ENCOUNTER — APPOINTMENT (OUTPATIENT)
Dept: PHYSICAL THERAPY | Facility: CLINIC | Age: 56
DRG: 005 | End: 2021-11-23
Payer: COMMERCIAL

## 2021-11-23 ENCOUNTER — APPOINTMENT (OUTPATIENT)
Dept: OCCUPATIONAL THERAPY | Facility: CLINIC | Age: 56
DRG: 005 | End: 2021-11-23
Payer: COMMERCIAL

## 2021-11-23 ENCOUNTER — APPOINTMENT (OUTPATIENT)
Dept: GENERAL RADIOLOGY | Facility: CLINIC | Age: 56
DRG: 005 | End: 2021-11-23
Attending: PHYSICIAN ASSISTANT
Payer: COMMERCIAL

## 2021-11-23 DIAGNOSIS — Z94.4 LIVER REPLACED BY TRANSPLANT (H): Primary | ICD-10-CM

## 2021-11-23 LAB
ALBUMIN SERPL-MCNC: 1.8 G/DL (ref 3.4–5)
ALBUMIN SERPL-MCNC: 2.1 G/DL (ref 3.4–5)
ALP SERPL-CCNC: 55 U/L (ref 40–150)
ALP SERPL-CCNC: 61 U/L (ref 40–150)
ALT SERPL W P-5'-P-CCNC: 45 U/L (ref 0–50)
ALT SERPL W P-5'-P-CCNC: 51 U/L (ref 0–50)
ANION GAP SERPL CALCULATED.3IONS-SCNC: 8 MMOL/L (ref 3–14)
APPEARANCE FLD: ABNORMAL
AST SERPL W P-5'-P-CCNC: 8 U/L (ref 0–45)
AST SERPL W P-5'-P-CCNC: 9 U/L (ref 0–45)
BACTERIA FLD CULT: NO GROWTH
BILIRUB DIRECT SERPL-MCNC: 0.6 MG/DL (ref 0–0.2)
BILIRUB DIRECT SERPL-MCNC: 0.7 MG/DL (ref 0–0.2)
BILIRUB SERPL-MCNC: 0.8 MG/DL (ref 0.2–1.3)
BILIRUB SERPL-MCNC: 1 MG/DL (ref 0.2–1.3)
BUN SERPL-MCNC: 56 MG/DL (ref 7–30)
CALCIUM SERPL-MCNC: 8 MG/DL (ref 8.5–10.1)
CHLORIDE BLD-SCNC: 105 MMOL/L (ref 94–109)
CO2 SERPL-SCNC: 21 MMOL/L (ref 20–32)
COLOR FLD: ABNORMAL
CREAT SERPL-MCNC: 1 MG/DL (ref 0.52–1.04)
ERYTHROCYTE [DISTWIDTH] IN BLOOD BY AUTOMATED COUNT: 17.3 % (ref 10–15)
GFR SERPL CREATININE-BSD FRML MDRD: 62 ML/MIN/1.73M2
GLUCOSE BLD-MCNC: 122 MG/DL (ref 70–99)
GLUCOSE BLDC GLUCOMTR-MCNC: 103 MG/DL (ref 70–99)
GLUCOSE BLDC GLUCOMTR-MCNC: 114 MG/DL (ref 70–99)
GLUCOSE BLDC GLUCOMTR-MCNC: 119 MG/DL (ref 70–99)
GLUCOSE BLDC GLUCOMTR-MCNC: 119 MG/DL (ref 70–99)
GLUCOSE BLDC GLUCOMTR-MCNC: 120 MG/DL (ref 70–99)
GLUCOSE BLDC GLUCOMTR-MCNC: 122 MG/DL (ref 70–99)
GLUCOSE BLDC GLUCOMTR-MCNC: 127 MG/DL (ref 70–99)
GLUCOSE BLDC GLUCOMTR-MCNC: 130 MG/DL (ref 70–99)
GLUCOSE BLDC GLUCOMTR-MCNC: 138 MG/DL (ref 70–99)
GLUCOSE BLDC GLUCOMTR-MCNC: 142 MG/DL (ref 70–99)
GLUCOSE BLDC GLUCOMTR-MCNC: 142 MG/DL (ref 70–99)
GLUCOSE BLDC GLUCOMTR-MCNC: 95 MG/DL (ref 70–99)
GLUCOSE BLDC GLUCOMTR-MCNC: 99 MG/DL (ref 70–99)
HCT VFR BLD AUTO: 26.5 % (ref 35–47)
HGB BLD-MCNC: 8.7 G/DL (ref 11.7–15.7)
LACTATE SERPL-SCNC: 0.8 MMOL/L (ref 0.7–2)
LYMPHOCYTES NFR FLD MANUAL: 5 %
MAGNESIUM SERPL-MCNC: 2.2 MG/DL (ref 1.6–2.3)
MCH RBC QN AUTO: 29.1 PG (ref 26.5–33)
MCHC RBC AUTO-ENTMCNC: 32.8 G/DL (ref 31.5–36.5)
MCV RBC AUTO: 89 FL (ref 78–100)
MONOS+MACROS NFR FLD MANUAL: 1 %
NEUTS BAND NFR FLD MANUAL: 94 %
PHOSPHATE SERPL-MCNC: 1.9 MG/DL (ref 2.5–4.5)
PLATELET # BLD AUTO: 112 10E3/UL (ref 150–450)
POTASSIUM BLD-SCNC: 3.3 MMOL/L (ref 3.4–5.3)
PROT SERPL-MCNC: 4.5 G/DL (ref 6.8–8.8)
PROT SERPL-MCNC: 5 G/DL (ref 6.8–8.8)
RBC # BLD AUTO: 2.99 10E6/UL (ref 3.8–5.2)
SODIUM SERPL-SCNC: 134 MMOL/L (ref 133–144)
TACROLIMUS BLD-MCNC: 4.5 UG/L (ref 5–15)
TME LAST DOSE: ABNORMAL H
TME LAST DOSE: ABNORMAL H
TRIGL FLD-MCNC: 25 MG/DL
VANCOMYCIN SERPL-MCNC: 15.3 MG/L
WBC # BLD AUTO: 17 10E3/UL (ref 4–11)
WBC # FLD AUTO: 720 /UL

## 2021-11-23 PROCEDURE — 84100 ASSAY OF PHOSPHORUS: CPT | Performed by: PHYSICIAN ASSISTANT

## 2021-11-23 PROCEDURE — 82040 ASSAY OF SERUM ALBUMIN: CPT | Performed by: PHYSICIAN ASSISTANT

## 2021-11-23 PROCEDURE — 258N000003 HC RX IP 258 OP 636: Performed by: PHYSICIAN ASSISTANT

## 2021-11-23 PROCEDURE — 250N000011 HC RX IP 250 OP 636: Performed by: TRANSPLANT SURGERY

## 2021-11-23 PROCEDURE — 250N000013 HC RX MED GY IP 250 OP 250 PS 637: Performed by: STUDENT IN AN ORGANIZED HEALTH CARE EDUCATION/TRAINING PROGRAM

## 2021-11-23 PROCEDURE — 36415 COLL VENOUS BLD VENIPUNCTURE: CPT | Performed by: TRANSPLANT SURGERY

## 2021-11-23 PROCEDURE — 80202 ASSAY OF VANCOMYCIN: CPT | Performed by: TRANSPLANT SURGERY

## 2021-11-23 PROCEDURE — 250N000013 HC RX MED GY IP 250 OP 250 PS 637: Performed by: PHYSICIAN ASSISTANT

## 2021-11-23 PROCEDURE — 84478 ASSAY OF TRIGLYCERIDES: CPT | Performed by: PHYSICIAN ASSISTANT

## 2021-11-23 PROCEDURE — 80197 ASSAY OF TACROLIMUS: CPT | Performed by: PHYSICIAN ASSISTANT

## 2021-11-23 PROCEDURE — 250N000013 HC RX MED GY IP 250 OP 250 PS 637: Performed by: TRANSPLANT SURGERY

## 2021-11-23 PROCEDURE — 89050 BODY FLUID CELL COUNT: CPT | Performed by: PHYSICIAN ASSISTANT

## 2021-11-23 PROCEDURE — 36592 COLLECT BLOOD FROM PICC: CPT | Performed by: PHYSICIAN ASSISTANT

## 2021-11-23 PROCEDURE — 80076 HEPATIC FUNCTION PANEL: CPT | Performed by: PHYSICIAN ASSISTANT

## 2021-11-23 PROCEDURE — 89051 BODY FLUID CELL COUNT: CPT | Performed by: PHYSICIAN ASSISTANT

## 2021-11-23 PROCEDURE — 120N000011 HC R&B TRANSPLANT UMMC

## 2021-11-23 PROCEDURE — 97535 SELF CARE MNGMENT TRAINING: CPT | Mod: GO | Performed by: OCCUPATIONAL THERAPIST

## 2021-11-23 PROCEDURE — 83735 ASSAY OF MAGNESIUM: CPT | Performed by: PHYSICIAN ASSISTANT

## 2021-11-23 PROCEDURE — 250N000012 HC RX MED GY IP 250 OP 636 PS 637: Performed by: PHYSICIAN ASSISTANT

## 2021-11-23 PROCEDURE — 83605 ASSAY OF LACTIC ACID: CPT | Performed by: TRANSPLANT SURGERY

## 2021-11-23 PROCEDURE — 97110 THERAPEUTIC EXERCISES: CPT | Mod: GP

## 2021-11-23 PROCEDURE — 74018 RADEX ABDOMEN 1 VIEW: CPT | Mod: 26 | Performed by: RADIOLOGY

## 2021-11-23 PROCEDURE — 250N000009 HC RX 250: Performed by: PHYSICIAN ASSISTANT

## 2021-11-23 PROCEDURE — 99207 PR SATISFY VISIT NUMBER: CPT | Performed by: TRANSPLANT SURGERY

## 2021-11-23 PROCEDURE — 250N000011 HC RX IP 250 OP 636: Performed by: PHYSICIAN ASSISTANT

## 2021-11-23 PROCEDURE — 258N000001 HC RX 258: Performed by: PHYSICIAN ASSISTANT

## 2021-11-23 PROCEDURE — 85027 COMPLETE CBC AUTOMATED: CPT | Performed by: PHYSICIAN ASSISTANT

## 2021-11-23 PROCEDURE — 36592 COLLECT BLOOD FROM PICC: CPT | Performed by: TRANSPLANT SURGERY

## 2021-11-23 PROCEDURE — 82374 ASSAY BLOOD CARBON DIOXIDE: CPT | Performed by: PHYSICIAN ASSISTANT

## 2021-11-23 PROCEDURE — 74018 RADEX ABDOMEN 1 VIEW: CPT

## 2021-11-23 RX ORDER — MULTIPLE VITAMINS W/ MINERALS TAB 9MG-400MCG
1 TAB ORAL DAILY
Status: DISCONTINUED | OUTPATIENT
Start: 2021-11-24 | End: 2021-11-27 | Stop reason: HOSPADM

## 2021-11-23 RX ORDER — PANTOPRAZOLE SODIUM 40 MG/1
40 TABLET, DELAYED RELEASE ORAL 2 TIMES DAILY
Status: DISCONTINUED | OUTPATIENT
Start: 2021-11-23 | End: 2021-11-27 | Stop reason: HOSPADM

## 2021-11-23 RX ORDER — DEXTROSE MONOHYDRATE 25 G/50ML
25-50 INJECTION, SOLUTION INTRAVENOUS
Status: DISCONTINUED | OUTPATIENT
Start: 2021-11-23 | End: 2021-11-27 | Stop reason: HOSPADM

## 2021-11-23 RX ORDER — GUAR GUM
1 PACKET (EA) ORAL 3 TIMES DAILY
Status: DISCONTINUED | OUTPATIENT
Start: 2021-11-23 | End: 2021-11-27 | Stop reason: HOSPADM

## 2021-11-23 RX ORDER — PREDNISONE 5 MG/1
5 TABLET ORAL DAILY
Status: DISCONTINUED | OUTPATIENT
Start: 2021-11-24 | End: 2021-11-26

## 2021-11-23 RX ORDER — NICOTINE POLACRILEX 4 MG
15-30 LOZENGE BUCCAL
Status: DISCONTINUED | OUTPATIENT
Start: 2021-11-23 | End: 2021-11-27 | Stop reason: HOSPADM

## 2021-11-23 RX ORDER — POTASSIUM CHLORIDE 750 MG/1
40 TABLET, EXTENDED RELEASE ORAL ONCE
Status: COMPLETED | OUTPATIENT
Start: 2021-11-23 | End: 2021-11-23

## 2021-11-23 RX ORDER — DEXTROSE MONOHYDRATE 100 MG/ML
INJECTION, SOLUTION INTRAVENOUS CONTINUOUS PRN
Status: DISCONTINUED | OUTPATIENT
Start: 2021-11-23 | End: 2021-11-27 | Stop reason: HOSPADM

## 2021-11-23 RX ORDER — VANCOMYCIN HYDROCHLORIDE 1 G/200ML
1000 INJECTION, SOLUTION INTRAVENOUS EVERY 24 HOURS
Status: COMPLETED | OUTPATIENT
Start: 2021-11-23 | End: 2021-11-24

## 2021-11-23 RX ADMIN — POTASSIUM CHLORIDE 40 MEQ: 750 TABLET, EXTENDED RELEASE ORAL at 13:44

## 2021-11-23 RX ADMIN — PREDNISONE 10 MG: 10 TABLET ORAL at 08:22

## 2021-11-23 RX ADMIN — DIBASIC SODIUM PHOSPHATE, MONOBASIC POTASSIUM PHOSPHATE AND MONOBASIC SODIUM PHOSPHATE 250 MG: 852; 155; 130 TABLET ORAL at 19:46

## 2021-11-23 RX ADMIN — MYCOPHENOLATE MOFETIL 750 MG: 250 CAPSULE ORAL at 18:04

## 2021-11-23 RX ADMIN — OXYCODONE HYDROCHLORIDE 5 MG: 5 TABLET ORAL at 08:26

## 2021-11-23 RX ADMIN — Medication 1 PACKET: at 19:47

## 2021-11-23 RX ADMIN — SULFAMETHOXAZOLE AND TRIMETHOPRIM 1 TABLET: 400; 80 TABLET ORAL at 08:22

## 2021-11-23 RX ADMIN — OXYCODONE HYDROCHLORIDE 5 MG: 5 TABLET ORAL at 21:34

## 2021-11-23 RX ADMIN — Medication 40 MG: at 08:27

## 2021-11-23 RX ADMIN — VANCOMYCIN HYDROCHLORIDE 1000 MG: 1 INJECTION, SOLUTION INTRAVENOUS at 18:22

## 2021-11-23 RX ADMIN — TACROLIMUS 2.5 MG: 1 CAPSULE ORAL at 18:03

## 2021-11-23 RX ADMIN — Medication 1 PACKET: at 08:41

## 2021-11-23 RX ADMIN — DEXTROSE MONOHYDRATE 1000 ML: 100 INJECTION, SOLUTION INTRAVENOUS at 08:56

## 2021-11-23 RX ADMIN — FLUCONAZOLE IN SODIUM CHLORIDE 200 MG: 2 INJECTION, SOLUTION INTRAVENOUS at 08:27

## 2021-11-23 RX ADMIN — TACROLIMUS 1.5 MG: 1 CAPSULE ORAL at 08:22

## 2021-11-23 RX ADMIN — MYCOPHENOLATE MOFETIL 750 MG: 250 CAPSULE ORAL at 08:21

## 2021-11-23 RX ADMIN — HUMAN INSULIN 3 UNITS/HR: 100 INJECTION, SOLUTION SUBCUTANEOUS at 02:14

## 2021-11-23 RX ADMIN — ASPIRIN 325 MG ORAL TABLET 325 MG: 325 PILL ORAL at 08:35

## 2021-11-23 RX ADMIN — DIBASIC SODIUM PHOSPHATE, MONOBASIC POTASSIUM PHOSPHATE AND MONOBASIC SODIUM PHOSPHATE 250 MG: 852; 155; 130 TABLET ORAL at 08:35

## 2021-11-23 RX ADMIN — Medication 15 ML: at 08:22

## 2021-11-23 RX ADMIN — PANTOPRAZOLE SODIUM 40 MG: 40 TABLET, DELAYED RELEASE ORAL at 19:46

## 2021-11-23 ASSESSMENT — ACTIVITIES OF DAILY LIVING (ADL)
ADLS_ACUITY_SCORE: 12
ADLS_ACUITY_SCORE: 16
ADLS_ACUITY_SCORE: 12
ADLS_ACUITY_SCORE: 11
ADLS_ACUITY_SCORE: 12
ADLS_ACUITY_SCORE: 16
ADLS_ACUITY_SCORE: 12
ADLS_ACUITY_SCORE: 11

## 2021-11-23 ASSESSMENT — MIFFLIN-ST. JEOR: SCORE: 1209.88

## 2021-11-23 NOTE — PHARMACY-CONSULT NOTE
Pharmacy Tube Feeding Consult    Medication reviewed for administration by feeding tube and for potential food/drug interactions.    Recommendation: No changes are needed at this time.  Per RN, patient currently able to take tablets and capsules by mouth.     Pharmacy will continue to follow as new medications are ordered.    Ivone Preciado [facility] on 11/23/2021 at 4:30 PM

## 2021-11-23 NOTE — PLAN OF CARE
[id number] Nursing Shift Report:  Berta appeared to sleep soundly between cares. VSS, fingersticks 119-142 on 4-3units of insulin per hour. Abdominal incision with large sutures intact remains clean and dry. ROMA sites have been leaky and did change the dressings x1. Roma's have been draining serosanguineous fluid. Has a  plus 2 lower extremity edema. Remains on bed alarm setting it off x1 this shift. Has been voiding and stooling without difficulty. Stools have been loose and states she has been having diarrhea. Have helped her to the bathroom x2 with a standby assist. Tube feed continues to infuse at goal rate of 40cc/hr with 30cc q4hr water flushes. Will continue to monitor and report any significant changes.

## 2021-11-23 NOTE — PLAN OF CARE
[id number]    VS: stable, on room air.   BG: checking every hour, ranged from 137-154.  Insulin gtt algorithm 3.  Labs: none  Pain/Nausea: oxycodone 5mg X 1 for incisional pain.  Denies nausea.  Diet: Regular diet, will start calorie counts after MN.  Tube feeds at 40cc/hr.  Encouraged pt to eat and drink ensure so she can get NJ tube out.    LDA: PIV saline locked.  Internal jugular infusing insulin gtt.  ROMA X 2 leaking at site.  Dressings changed.    GI: several loose stools today.   : voiding  Skin: bruised and fragile  Mobility: up with stand by assist.    Plan: needs med card and lab book.  Encourage activity and oral intake.

## 2021-11-23 NOTE — PROGRESS NOTES
"Transplant Social Work Services Progress Note    Date of Initial Social Work Evaluation: 3/23/21  Collaborated with: Patient    Data: Patient is a 56 year old female with PMH significant for PSC/UC, Sjogren's syndrome, HTN, liver transplant aborted in 2021 d/t V tach. S/p  donor liver transplant with biliary stent on 21, complicated by coagulopathy and finding of intra-abdominal abscess. Return to OR 21 for washout and abdominal closure.    Intervention: SW met with patient at bedside to provide supportive check in and orient to 7A SW role.     Assessment: Patient so appreciative of her liver and second chance at life. Patient reports feeling \"less sick\". Patient is interested in writing to the donor's family. SW provided information on how to write to donor family. CACHORRO also provided name, phone number, and role of 7A SW. SW updated patient with current discharge recs. Patient confirmed her caregiver plan post hospitalization would be her  Demar and daughter Karin. Patient appreciative of check in and information on how to write to donor family.     Education provided by CACHORRO: How to Write to Donor Family, 7A Role/Contact, Discharge Plans    Plan:    Discharge Plans in Progress: Home with assist and home care    Barriers to d/c plan: Medical Stability    Follow up Plan: CACHORRO to continue following for support    JOSETTE Barboza, MELISA  7A   Ph: [phone]  Pager: [phone]  "

## 2021-11-23 NOTE — PROGRESS NOTES
"CLINICAL NUTRITION SERVICES - REASSESSMENT NOTE     Nutrition Prescription    RECOMMENDATIONS FOR MDs/PROVIDERS TO ORDER:  Recommend pt on average meets at least 75% minimum assessed needs (~1300 kcal, 60 g pro) to prior to discontinuation of EN support    Malnutrition Status:    Severe malnutrition in the context of acute on chronic illness    Recommendations already ordered by Registered Dietitian (RD):  Continue Reg diet + Room service with assist   Continue EN as ordered  Ordered Nutrisource fiber 1 pkt TID will provide an additional 45 kcals.  Modified ONS regmen to better meet pt needs.     Future/Additional Recommendations:  - Monitor PO intake  - Monitor stooling     EVALUATION OF THE PROGRESS TOWARD GOALS   Diet: Regular + Room service with assist + Ensure Enlive btw meals  Nutrition Support:  TwoCal HN @ 40 mL/hr (960 mL/day) + 1 pkt Prosource daily to provide 1960 kcals (35 kcal/kg/day), 92 g PRO (1.6 g/kg/day), 672 mL H2O, 210 g CHO and 5 g Fiber daily.    PO Intake: Per flowsheet, intake % of meals ordered. Chris counts ordered for 11/23-11/25.     EN Intake: Average EN infusion over the past 4 days provided 513 mL, 1026 kcals (18 kcal/kg) and 43 g protein (0.8 g pro/kg) which provided 61% of estimated energy needs and 51% of estimated protein needs.     NEW FINDINGS   Weight: 62.9 kg (138 lb 10.7 oz) - wt increase of 15# since admit, likely due to fluids.     Labs:     K 3.3 (L)  BUN 56 (H)  Calcium 8.0 (L)  Phos 1.9 (L)  ALT 51 (H)    Medications:    MSSI  Thera-vit-M  Mycophenolate  Phosphorus tablet 250 mg BID  Miralax - held  Potassium chloride  Senokot - held  Tacrolimus    GI:  BM x 1 noted yesterday and today. Per RN note this morning, \"Stools have been loose and states she has been having diarrhea.\"     MALNUTRITION  % Intake: < 75% for > 7 days (moderate)  % Weight Loss: Difficult to assess d/t fluid status  Subcutaneous Fat Loss:  Orbital region moderate depletion and Upper arm region " moderate to severe depletion - per recent RD assessment  Muscle Loss:  Temporal region moderate depletion, Clavicle bone region moderate to severe depletion and Acromion bone region moderate to severe depletion - per recent RD assessment  Fluid Accumulation/Edema: +ascites (d/t liver disease)  Malnutrition Diagnosis: Severe malnutrition in the context of acute on chronic illness    Previous Goals   Diet adv v nutrition support within 2-3 days.  Evaluation: Met    Previous Nutrition Diagnosis  Inadequate oral intake related to reduced appetite, early satiety as evidenced by fat/muscle wasting per recent physical exam.     Evaluation: No change    CURRENT NUTRITION DIAGNOSIS  Inadequate oral intake related to reduced appetite, early satiety as evidenced by fat/muscle wasting per recent physical exam.       INTERVENTIONS  Implementation   Nutrition Education:  -- Provided instruction on post-transplant diet with discussion regarding protein sources and high protein needs in acute post-tx phase.   -- Discussed monitoring of K+/Phos lab values with possible need for adjustment of these in the diet as necessary.   -- Reviewed recommendations to follow low fat/low sodium diet long term and discussed heart healthy diet tips.    -- Reviewed need for food safety precautions to prevent food borne illness.  Provided & reviewed handout: Post-transplant diet guidelines, How to Increase calories in your diet, and How to increase protein in your diet. Patient receptive to information provided. Expected diet compliance is good.     Collaboration with other providers during 7A rounds  Enteral Nutrition - adding Nutrisource Fiber  Medical food supplement therapy - modify    Goals  Total avg nutritional intake to meet a minimum of 30 kcal/kg and 1.5 g PRO/kg daily (per dosing wt 56 kg).    Monitoring/Evaluation  Progress toward goals will be monitored and evaluated per protocol.    Darling Esparza RDN, LD  7A/OBS Pg [phone]

## 2021-11-23 NOTE — PROGRESS NOTES
Transplant Surgery  Inpatient Daily Progress Note  2021    Assessment & Plan: Berta Nava is a 56 year old female with PMH significant for PSC/UC, Sjogren's syndrome, HTN, liver transplant aborted in 2021 d/t V tach. S/p  donor liver transplant with biliary stent on 21, complicated by coagulopathy and finding of intra-abdominal abscess. Return to OR 21 for washout and abdominal closure.    Graft function: DDLTx 21 POD #5/4 from washout/closure. Transaminases improving, TB normalized, 0.8  - US: left hepatic artery and left portal vein not visualized due to limited sonographic windows. Repeat US post closure:normal, patent doppler.  Immunosuppression management: Induction w/ steroids.  -Tacro 2.5 mg BID, titrate to a level goal 8-10. Monitor daily in setting of fluconazole,  level 4.5 (12 hour trough). Increase to 2.5 from 1.5. Diflucan was added on .   -MMF 750mg BID   -Steroid taper per protocol.  Complexity of management: High. Contributing factors: induction.  Hematology:   ACD/ABL: Hgb 8.7<-8.4<-8.7  Thrombocytopenia, acute: Plt increased to 112  Coagulopathy: INR normalized, will no longer check daily   Cardiorespiratory:   Circulatory failure/ Hypotension: weaned off pressors .   Post-op mechanical ventilation: Extubated . RA. Continue CDB/IS  GI/Nutrition:   Diet: Regular diet as tolerated. NJ in place, start tube feeds.   Post op ileus: Continue bowel regimen  Endocrine:   Steroid induced hyperglycemia: Hgb A1c 5.4. Insulin gtt required initially, now discontinued, ssi prn.   Fluid/Electrolytes: Hypervolemia : +5Kg, initialized diuresis with Lasix gtt 10/hr +metolazone 5 with good results, now discontinued.   GONZALO: Cr 1.0<-1.5<-1.7, but up from ~0.9. Monitor. Good UOP  Hypokalemia: K 3.3, replace  : Jurado removed  Infectious disease: Afebrile  Leukocytosis WBC 17.0<-16.7<-17.5 (16.6)  Intra abdominal abscess: Noted in OR on . Gram stain +  "GPC. Culture Staph epi. On vanco. Continued zosyn x4 days post closure. Continue fluconazole.   Prophylaxis: DVT(mechanical), fall, GI (PPI), Fungal ppx (Diflucan) CMV ppx (gancyclovir), PCP ppx (bactrim)  Disposition: 7A    Medical Decision Making: High  Subsequent visit 94848 (high level decision making)    [place]/Fellow/Resident Provider: Jennifer Valentin PA-C [id number]    Faculty: Nasir Rojas M.D.  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    11/18/2021 (Liver), Postoperative day: 5     Interval History:  Obtain from the patient     Overnight events: NAEs.      ROS:   A 10-point review of systems was negative except as noted above.    Curent Meds:    aspirin  325 mg Oral or Feeding Tube Daily     fluconazole  200 mg Intravenous Q24H     insulin aspart  1-7 Units Subcutaneous TID AC     insulin aspart  1-5 Units Subcutaneous At Bedtime     [START ON 11/24/2021] multivitamin w/minerals  1 tablet Oral Daily     mycophenolate  750 mg Oral BID IS     pantoprazole  40 mg Oral BID     phosphorus tablet 250 mg  250 mg Oral BID     polyethylene glycol  17 g Oral Daily     protein modular  1 packet Per Feeding Tube Daily     senna-docusate  1 tablet Oral BID    Or     senna-docusate  2 tablet Oral BID     sodium chloride (PF)  3 mL Intravenous Q8H     sulfamethoxazole-trimethoprim  1 tablet Oral Daily     tacrolimus  2.5 mg Oral BID IS     valGANciclovir  450 mg Oral Every Other Day     vancomycin  750 mg Intravenous Q24H       Physical Exam:     Admit Weight: 57.6 kg (127 lb)    Current Vitals:   /77 (BP Location: Left arm)   Pulse 71   Temp 98.2  F (36.8  C) (Oral)   Resp 16   Ht 1.651 m (5' 5\")   Wt 62.9 kg (138 lb 10.7 oz)   LMP  (LMP Unknown)   SpO2 100%   BMI 23.08 " kg/m      Vital sign ranges:    Temp:  [97.2  F (36.2  C)-98.3  F (36.8  C)] 98.2  F (36.8  C)  Pulse:  [] 71  Resp:  [14-18] 16  BP: (121-142)/(46-83) 134/77  SpO2:  [98 %-100 %] 100 %  Patient Vitals for the past 24 hrs:   BP Temp Temp src Pulse Resp SpO2 Weight   11/23/21 1056 134/77 98.2  F (36.8  C) Oral 71 16 100 % --   11/23/21 0735 -- -- -- -- -- -- 62.9 kg (138 lb 10.7 oz)   11/23/21 0723 139/83 97.5  F (36.4  C) Oral 65 16 100 % --   11/23/21 0351 127/67 97.2  F (36.2  C) Oral 66 16 100 % --   11/22/21 2318 121/76 98.3  F (36.8  C) Oral 64 14 98 % --   11/22/21 2244 127/77 97.6  F (36.4  C) Oral 103 14 98 % --   11/22/21 2033 133/78 97.4  F (36.3  C) Oral 68 18 100 % --   11/22/21 1829 (!) 142/46 97.6  F (36.4  C) Oral 71 18 100 % --   11/22/21 1600 139/76 97.9  F (36.6  C) Oral 65 16 100 % --   11/22/21 1500 -- -- -- -- -- -- 62.2 kg (137 lb 2 oz)     General Appearance: NAD  Skin: warm, dry, no jaundice. Scattered ecchymosis. See abd exam  Heart: regular rate and rhythm  Lungs: NLB on RA  Abdomen: soft, no guarding, incision with sutures intact with minimal s/s drainage at trifurcation. ROMA x2, serosang drainage x1 and milkly drainage in other.   : Voiding.   Extremities: edema: BRIGHT +2 bilaterally  Neurologic: A0x3.    Frailty Scores     Frailty Scores 3/23/2021 3/13/2021    Final Score Not Frail Not Frail    Final Score Number 0 0          Data:   CMP  Recent Labs   Lab 11/23/21  1059 11/23/21  0956 11/23/21  0859 11/23/21  0850 11/23/21  0551 11/23/21  0538 11/22/21  0423 11/22/21  0419 11/22/21  0418 11/21/21  1742 11/21/21  1738 11/21/21  0331 11/21/21  0318 11/19/21  0541 11/19/21  0533 11/18/21  1559 11/18/21  1446   NA  --   --   --  134  --   --   --   --   --   --  130*  --  134   < > 140  --  143   POTASSIUM  --   --   --  3.3*  --   --   --  3.2*  --   --  3.2*  --  2.8*   < > 3.6   < > 3.2*   CHLORIDE  --   --   --  105  --   --   --   --   --   --  103  --  105   < > 111*  --  110*    CO2  --   --   --  21  --   --   --   --   --   --  19*  --  21   < > 22  --  26   * 95   < > 122*   < >  --    < >  --   --    < > 151*   < > 135*   < > 128*   < > 128*   BUN  --   --   --  56*  --   --   --   --   --   --  43*  --  39*   < > 21  --  14   CR  --   --   --  1.00  --   --   --  1.53*  --   --  1.70*  --  1.64*   < > 0.89  --  0.55   GFRESTIMATED  --   --   --  62  --   --   --  37*  --   --  33*  --  34*   < > 72  --  >90   MARIELENA  --   --   --  8.0*  --   --   --   --   --   --  7.6*  --  7.5*   < > 8.6  --  9.3   ICAW  --   --   --   --   --   --   --   --  4.6  --   --   --  4.6   < > 4.9  --   --    MAG  --   --   --   --   --  2.2  --  1.8  --   --   --   --  1.9   < > 1.8   < > 1.7   PHOS  --   --   --   --   --  1.9*  --  3.7  --   --   --   --  4.8*   < > 3.9   < > 2.7   AMYLASE  --   --   --   --   --   --   --   --   --   --   --   --   --   --  36  --  31   LIPASE  --   --   --   --   --   --   --   --   --   --   --   --   --   --  83  --  156   ALBUMIN  --   --   --  2.1*  --  1.8*  --  1.9*  --   --  2.0*  --  2.0*   < > 2.5*  --  1.6*  1.6*   BILITOTAL  --   --   --  1.0  --  0.8  --  0.9  --   --  0.9  --  0.8   < > 1.3  --  3.1*  3.1*   ALKPHOS  --   --   --  61  --  55  --  46  --   --  48  --  37*   < > 36*  --  41  41   AST  --   --   --  9  --  8  --  12  --   --  16  --  22   < > 98*  --  280*  280*   ALT  --   --   --  51*  --  45  --  62*  --   --  73*  --  79*   < > 140*  --  200*  200*    < > = values in this interval not displayed.     CBC  Recent Labs   Lab 11/23/21  0951 11/22/21  0419 11/19/21  0127 11/19/21  0126   HGB 8.7* 8.4*   < >  --    WBC 17.0* 16.7*   < >  --    * 67*   < >  --    A1C  --   --   --  5.4    < > = values in this interval not displayed.     COAGS  Recent Labs   Lab 11/22/21  0419 11/21/21  0318   INR 1.22* 1.34*   PTT 29 31      Urinalysis  Recent Labs   Lab Test 11/20/21  1030 11/06/21  0509 06/10/21  0632 05/21/21  0730   COLOR  Yellow Dark Yellow*   < > Dark Yellow   APPEARANCE Clear Clear   < > Clear   URINEGLC Negative Negative   < > Negative   URINEBILI Negative Moderate*   < > Negative   URINEKETONE Negative Negative   < > Negative   SG 1.020 1.018   < > 1.031   UBLD Small* Negative   < > Negative   URINEPH 5.0 5.5   < > 6.0   PROTEIN 30 * Negative   < > 20*   NITRITE Negative Negative   < > Negative   LEUKEST Negative Negative   < > Negative   RBCU 7* <1   < > 1   WBCU 3 1   < > 4   UTPG  --   --   --  0.26*    < > = values in this interval not displayed.     Virology:  Hepatitis C Antibody   Date Value Ref Range Status   11/17/2021 Nonreactive Nonreactive Final

## 2021-11-23 NOTE — PHARMACY-VANCOMYCIN DOSING SERVICE
"Pharmacy Vancomycin Note  Date of Service 2021  Patient's  1965   56 year old, female    Indication: Abscess  Day of Therapy: 6  Current vancomycin regimen:  750 mg IV q24h  Current vancomycin monitoring method: AUC  Current vancomycin therapeutic monitoring goal: 400-600 mg*h/L    InsightRX Prediction of Current Vancomycin Regimen  Regimen: 750 mg IV every 24 hours.  Start time: 17:53 on 2021  Exposure target: AUC24 (range)400-600 mg/L.hr   AUC24,ss: 360 mg/L.hr  Probability of AUC24 > 400: 24 %  Ctrough,ss: 10.3 mg/L  Probability of Ctrough,ss > 20: 0 %  Probability of nephrotoxicity (Lodise DENA ): 6 %      Current estimated CrCl = Estimated Creatinine Clearance: 60.9 mL/min (based on SCr of 1 mg/dL).    Creatinine for last 3 days  2021:  3:18 AM Creatinine 1.64 mg/dL;  5:38 PM Creatinine 1.70 mg/dL  2021:  4:19 AM Creatinine 1.53 mg/dL  2021:  8:50 AM Creatinine 1.00 mg/dL    Recent Vancomycin Levels (past 3 days)  2021:  3:18 AM Vancomycin 21.9 mg/L  2021:  4:26 PM Vancomycin 15.3 mg/L    Vancomycin IV Administrations (past 72 hours)                   vancomycin (VANCOCIN) 750 mg in sodium chloride 0.9 % 250 mL intermittent infusion (mg) 750 mg New Bag 21 1525     750 mg New Bag 21 1549                Nephrotoxins and other renal medications (From now, onward)    Start     Dose/Rate Route Frequency Ordered Stop    21 1800  tacrolimus (GENERIC EQUIVALENT) capsule 2.5 mg        \"Or\" Linked Group Details    2.5 mg Oral 2 TIMES DAILY. 21 1252      21 1800  vancomycin (VANCOCIN) 1000 mg in dextrose 5% 200 mL PREMIX         1,000 mg  200 mL/hr over 1 Hours Intravenous EVERY 24 HOURS 21 1757               Contrast Orders - past 72 hours (72h ago, onward)            None          Interpretation of levels and current regimen:  Vancomycin level is reflective of AUC less than 400    Has serum creatinine changed greater than 50% " in last 72 hours: No    Urine output:  good urine output from yesterday's charting, today's charting not complete    Renal Function: Improving    InsightRX Prediction of Planned New Vancomycin Regimen  Regimen: 1000 mg IV every 24 hours.  Start time: 17:53 on 11/23/2021  Exposure target: AUC24 (range)400-600 mg/L.hr   AUC24,ss: 466 mg/L.hr  Probability of AUC24 > 400: 87 %  Ctrough,ss: 13.4 mg/L  Probability of Ctrough,ss > 20: 2 %  Probability of nephrotoxicity (Lodise DENA 2009): 9 %      Plan:  1. Increase Dose to 1000mg q24h  2. Vancomycin monitoring method: AUC  3. Vancomycin therapeutic monitoring goal: 400-600 mg*h/L  4. Pharmacy will check vancomycin levels as appropriate in 1-3 Days.  5. Serum creatinine levels will be ordered daily for the first week of therapy and at least twice weekly for subsequent weeks.    Ivone Preciado,AliceD, BCPS

## 2021-11-23 NOTE — PROGRESS NOTES
CLINICAL NUTRITION SERVICES - DISCHARGE NOTE    Patient s discharge needs assessed and discharge planning has been conducted with the multidisciplinary transplant care team including physicians, pharmacy, social work and transplant coordinator.    Follow up/Monitoring:  Once discharged, place outpatient nutrition consult via the transplant team if nutrition concerns arise.    Darling Esparza RDN, LD  7A/OBS Pg [phone]

## 2021-11-23 NOTE — PROGRESS NOTES
"Organ specific education completed, and discharge planning has been conducted with multidisciplinary transplant care team including physicians, pharmacy, nutrition, and social work.     Met with liver [place] and discharge coordinator, Brissa Ruelas  on 7A.  Discussed pertinent health issues related to liver transplant and discharge planning.    Met with Berta and her daughter Karin.  Introduced myself and explained the role of the post liver transplant coordinator and support team.    Briefly discussed the following topics:  1.) immunosuppression and the importance of taking regularly as directed.  2.) pertinent labs and their interpretation  3.) rejection signs / symptoms and treatment and 4.) Importance of long term follow-up with the transplant center and primary care physician   Berta has lab book and understands responsibility of getting and recording results. Education in process.  (See inpatient education teaching flowsheet).  Pt is aware that they will have clinic visit 1 week after discharge from hospital or transitional care / rehab. Discharge education will be reinforced on on phone calls.  Pt is aware that she needs a follow-up appointment with pre transplant primary care within 1-2 weeks of returning home.  Patient is aware that he will need follow-up with the transplant team for the rest of  her life.  Initial follow-up will be with the transplant surgeon Dr. Schmitt, then move to pre-transplant hepatologist, Dr. Leventhal.  Patient is aware that she will need lab testing, initially every Monday and Thursday to monitor liver function on a regular basis for the rest of her life.   Gave patient handouts entitled \"Things to Remember\" and \"Your Lab Results\" as well as a copy of contact numbers for myself, social work, transplant LPN and after hours/ weekend / holiday phone numbers.  Pt denies further questions at this time.   "

## 2021-11-23 NOTE — PLAN OF CARE
Transferred to: Unit 7A  at 1825  Belongings: duffel bag w/ toiletries, clothing, bilateral hearing aids &  case, phone , phone, shoes.  Ferguson removed? Yes  Central line removed? No, team did not want to remove today.   Chart and medications sent with patient Yes  Family notified: Yes     A&Ox4. VSS on RA. Tolerating regular diet. NJ w/ TF @ goal. No BM, continue bowel regimen. Flatus+. Voiding spontaneously after ferguson removal at 1030. ROMA x2. Clamshell incision open to air. Insulin gtt on Algorithm 3.

## 2021-11-24 ENCOUNTER — APPOINTMENT (OUTPATIENT)
Dept: PHYSICAL THERAPY | Facility: CLINIC | Age: 56
DRG: 005 | End: 2021-11-24
Payer: COMMERCIAL

## 2021-11-24 ENCOUNTER — TELEPHONE (OUTPATIENT)
Dept: TRANSPLANT | Facility: CLINIC | Age: 56
End: 2021-11-24
Payer: COMMERCIAL

## 2021-11-24 ENCOUNTER — HOME INFUSION (PRE-WILLOW HOME INFUSION) (OUTPATIENT)
Dept: PHARMACY | Facility: CLINIC | Age: 56
End: 2021-11-24
Payer: COMMERCIAL

## 2021-11-24 ENCOUNTER — APPOINTMENT (OUTPATIENT)
Dept: OCCUPATIONAL THERAPY | Facility: CLINIC | Age: 56
DRG: 005 | End: 2021-11-24
Payer: COMMERCIAL

## 2021-11-24 LAB
ALBUMIN SERPL-MCNC: 1.8 G/DL (ref 3.4–5)
ALBUMIN UR-MCNC: NEGATIVE MG/DL
ALP SERPL-CCNC: 58 U/L (ref 40–150)
ALT SERPL W P-5'-P-CCNC: 36 U/L (ref 0–50)
ANION GAP SERPL CALCULATED.3IONS-SCNC: 8 MMOL/L (ref 3–14)
APPEARANCE UR: CLEAR
AST SERPL W P-5'-P-CCNC: 8 U/L (ref 0–45)
BACTERIA FLD CULT: NORMAL
BASOPHILS # BLD AUTO: 0 10E3/UL (ref 0–0.2)
BASOPHILS NFR BLD AUTO: 0 %
BILIRUB DIRECT SERPL-MCNC: 0.5 MG/DL (ref 0–0.2)
BILIRUB SERPL-MCNC: 0.8 MG/DL (ref 0.2–1.3)
BILIRUB UR QL STRIP: NEGATIVE
BUN SERPL-MCNC: 49 MG/DL (ref 7–30)
CALCIUM SERPL-MCNC: 8 MG/DL (ref 8.5–10.1)
CHLORIDE BLD-SCNC: 106 MMOL/L (ref 94–109)
CO2 SERPL-SCNC: 21 MMOL/L (ref 20–32)
COLOR UR AUTO: NORMAL
CREAT SERPL-MCNC: 0.74 MG/DL (ref 0.52–1.04)
EOSINOPHIL # BLD AUTO: 0.2 10E3/UL (ref 0–0.7)
EOSINOPHIL NFR BLD AUTO: 1 %
ERYTHROCYTE [DISTWIDTH] IN BLOOD BY AUTOMATED COUNT: 17 % (ref 10–15)
GFR SERPL CREATININE-BSD FRML MDRD: 90 ML/MIN/1.73M2
GLUCOSE BLD-MCNC: 117 MG/DL (ref 70–99)
GLUCOSE BLDC GLUCOMTR-MCNC: 107 MG/DL (ref 70–99)
GLUCOSE BLDC GLUCOMTR-MCNC: 114 MG/DL (ref 70–99)
GLUCOSE BLDC GLUCOMTR-MCNC: 120 MG/DL (ref 70–99)
GLUCOSE BLDC GLUCOMTR-MCNC: 90 MG/DL (ref 70–99)
GLUCOSE UR STRIP-MCNC: NEGATIVE MG/DL
HCT VFR BLD AUTO: 25.9 % (ref 35–47)
HGB BLD-MCNC: 8.5 G/DL (ref 11.7–15.7)
HGB UR QL STRIP: NEGATIVE
IMM GRANULOCYTES # BLD: 0.2 10E3/UL
IMM GRANULOCYTES NFR BLD: 1 %
KETONES UR STRIP-MCNC: NEGATIVE MG/DL
LEUKOCYTE ESTERASE UR QL STRIP: NEGATIVE
LYMPHOCYTES # BLD AUTO: 0.9 10E3/UL (ref 0.8–5.3)
LYMPHOCYTES NFR BLD AUTO: 5 %
MAGNESIUM SERPL-MCNC: 2 MG/DL (ref 1.6–2.3)
MCH RBC QN AUTO: 28.9 PG (ref 26.5–33)
MCHC RBC AUTO-ENTMCNC: 32.8 G/DL (ref 31.5–36.5)
MCV RBC AUTO: 88 FL (ref 78–100)
MONOCYTES # BLD AUTO: 0.8 10E3/UL (ref 0–1.3)
MONOCYTES NFR BLD AUTO: 4 %
NEUTROPHILS # BLD AUTO: 15.5 10E3/UL (ref 1.6–8.3)
NEUTROPHILS NFR BLD AUTO: 89 %
NITRATE UR QL: NEGATIVE
NRBC # BLD AUTO: 0 10E3/UL
NRBC BLD AUTO-RTO: 0 /100
PH UR STRIP: 5 [PH] (ref 5–7)
PHOSPHATE SERPL-MCNC: 1.5 MG/DL (ref 2.5–4.5)
PLATELET # BLD AUTO: 151 10E3/UL (ref 150–450)
POTASSIUM BLD-SCNC: 4.3 MMOL/L (ref 3.4–5.3)
PROT SERPL-MCNC: 4.6 G/DL (ref 6.8–8.8)
RBC # BLD AUTO: 2.94 10E6/UL (ref 3.8–5.2)
SODIUM SERPL-SCNC: 135 MMOL/L (ref 133–144)
SP GR UR STRIP: 1.01 (ref 1–1.03)
TACROLIMUS BLD-MCNC: 5.8 UG/L (ref 5–15)
TME LAST DOSE: NORMAL H
TME LAST DOSE: NORMAL H
UROBILINOGEN UR STRIP-MCNC: NORMAL MG/DL
WBC # BLD AUTO: 17.7 10E3/UL (ref 4–11)

## 2021-11-24 PROCEDURE — 250N000013 HC RX MED GY IP 250 OP 250 PS 637: Performed by: TRANSPLANT SURGERY

## 2021-11-24 PROCEDURE — 250N000012 HC RX MED GY IP 250 OP 636 PS 637: Performed by: PHYSICIAN ASSISTANT

## 2021-11-24 PROCEDURE — 97530 THERAPEUTIC ACTIVITIES: CPT | Mod: GP

## 2021-11-24 PROCEDURE — 80076 HEPATIC FUNCTION PANEL: CPT | Performed by: PHYSICIAN ASSISTANT

## 2021-11-24 PROCEDURE — 87040 BLOOD CULTURE FOR BACTERIA: CPT | Performed by: PHYSICIAN ASSISTANT

## 2021-11-24 PROCEDURE — 85025 COMPLETE CBC W/AUTO DIFF WBC: CPT | Performed by: PHYSICIAN ASSISTANT

## 2021-11-24 PROCEDURE — 87086 URINE CULTURE/COLONY COUNT: CPT | Performed by: PHYSICIAN ASSISTANT

## 2021-11-24 PROCEDURE — 250N000013 HC RX MED GY IP 250 OP 250 PS 637: Performed by: PHYSICIAN ASSISTANT

## 2021-11-24 PROCEDURE — 250N000011 HC RX IP 250 OP 636: Performed by: PHYSICIAN ASSISTANT

## 2021-11-24 PROCEDURE — 120N000011 HC R&B TRANSPLANT UMMC

## 2021-11-24 PROCEDURE — 99207 PR SATISFY VISIT NUMBER: CPT | Performed by: TRANSPLANT SURGERY

## 2021-11-24 PROCEDURE — 250N000013 HC RX MED GY IP 250 OP 250 PS 637: Performed by: STUDENT IN AN ORGANIZED HEALTH CARE EDUCATION/TRAINING PROGRAM

## 2021-11-24 PROCEDURE — 80197 ASSAY OF TACROLIMUS: CPT | Performed by: PHYSICIAN ASSISTANT

## 2021-11-24 PROCEDURE — 81003 URINALYSIS AUTO W/O SCOPE: CPT | Performed by: PHYSICIAN ASSISTANT

## 2021-11-24 PROCEDURE — 83735 ASSAY OF MAGNESIUM: CPT | Performed by: PHYSICIAN ASSISTANT

## 2021-11-24 PROCEDURE — 36592 COLLECT BLOOD FROM PICC: CPT | Performed by: PHYSICIAN ASSISTANT

## 2021-11-24 PROCEDURE — 84100 ASSAY OF PHOSPHORUS: CPT | Performed by: PHYSICIAN ASSISTANT

## 2021-11-24 PROCEDURE — 97535 SELF CARE MNGMENT TRAINING: CPT | Mod: GO | Performed by: OCCUPATIONAL THERAPIST

## 2021-11-24 RX ORDER — CLOTRIMAZOLE 10 MG/1
1 LOZENGE ORAL 4 TIMES DAILY
Status: DISCONTINUED | OUTPATIENT
Start: 2021-11-26 | End: 2021-11-27 | Stop reason: HOSPADM

## 2021-11-24 RX ORDER — FLUCONAZOLE 200 MG/1
400 TABLET ORAL ONCE
Status: COMPLETED | OUTPATIENT
Start: 2021-11-25 | End: 2021-11-25

## 2021-11-24 RX ORDER — DOXYCYCLINE 100 MG/1
100 CAPSULE ORAL EVERY 12 HOURS SCHEDULED
Status: DISCONTINUED | OUTPATIENT
Start: 2021-11-25 | End: 2021-11-27 | Stop reason: HOSPADM

## 2021-11-24 RX ORDER — VALGANCICLOVIR 450 MG/1
450 TABLET, FILM COATED ORAL DAILY
Status: DISCONTINUED | OUTPATIENT
Start: 2021-11-25 | End: 2021-11-27

## 2021-11-24 RX ORDER — FUROSEMIDE 20 MG
20 TABLET ORAL DAILY
Status: DISCONTINUED | OUTPATIENT
Start: 2021-11-24 | End: 2021-11-27 | Stop reason: HOSPADM

## 2021-11-24 RX ADMIN — FLUCONAZOLE IN SODIUM CHLORIDE 200 MG: 2 INJECTION, SOLUTION INTRAVENOUS at 08:19

## 2021-11-24 RX ADMIN — PREDNISONE 5 MG: 5 TABLET ORAL at 08:21

## 2021-11-24 RX ADMIN — VANCOMYCIN HYDROCHLORIDE 1000 MG: 1 INJECTION, SOLUTION INTRAVENOUS at 18:40

## 2021-11-24 RX ADMIN — TACROLIMUS 2.5 MG: 1 CAPSULE ORAL at 18:40

## 2021-11-24 RX ADMIN — POLYETHYLENE GLYCOL 3350 17 G: 17 POWDER, FOR SOLUTION ORAL at 08:19

## 2021-11-24 RX ADMIN — OXYCODONE HYDROCHLORIDE 5 MG: 5 TABLET ORAL at 08:46

## 2021-11-24 RX ADMIN — SODIUM PHOSPHATE, DIBASIC, ANHYDROUS, POTASSIUM PHOSPHATE, MONOBASIC, AND SODIUM PHOSPHATE, MONOBASIC, MONOHYDRATE 250 MG: 852; 155; 130 TABLET, COATED ORAL at 11:19

## 2021-11-24 RX ADMIN — VALGANCICLOVIR 450 MG: 450 TABLET, FILM COATED ORAL at 08:18

## 2021-11-24 RX ADMIN — SODIUM PHOSPHATE, DIBASIC, ANHYDROUS, POTASSIUM PHOSPHATE, MONOBASIC, AND SODIUM PHOSPHATE, MONOBASIC, MONOHYDRATE 500 MG: 852; 155; 130 TABLET, COATED ORAL at 19:39

## 2021-11-24 RX ADMIN — SULFAMETHOXAZOLE AND TRIMETHOPRIM 1 TABLET: 400; 80 TABLET ORAL at 08:19

## 2021-11-24 RX ADMIN — Medication 1 PACKET: at 08:20

## 2021-11-24 RX ADMIN — ASPIRIN 325 MG ORAL TABLET 325 MG: 325 PILL ORAL at 08:18

## 2021-11-24 RX ADMIN — OXYCODONE HYDROCHLORIDE 5 MG: 5 TABLET ORAL at 03:57

## 2021-11-24 RX ADMIN — Medication 1 TABLET: at 08:18

## 2021-11-24 RX ADMIN — FUROSEMIDE 20 MG: 20 TABLET ORAL at 11:19

## 2021-11-24 RX ADMIN — OXYCODONE HYDROCHLORIDE 5 MG: 5 TABLET ORAL at 13:50

## 2021-11-24 RX ADMIN — DIBASIC SODIUM PHOSPHATE, MONOBASIC POTASSIUM PHOSPHATE AND MONOBASIC SODIUM PHOSPHATE 250 MG: 852; 155; 130 TABLET ORAL at 08:18

## 2021-11-24 RX ADMIN — PANTOPRAZOLE SODIUM 40 MG: 40 TABLET, DELAYED RELEASE ORAL at 19:39

## 2021-11-24 RX ADMIN — OXYCODONE HYDROCHLORIDE 5 MG: 5 TABLET ORAL at 17:33

## 2021-11-24 RX ADMIN — MYCOPHENOLATE MOFETIL 750 MG: 250 CAPSULE ORAL at 08:19

## 2021-11-24 RX ADMIN — Medication 1 PACKET: at 13:50

## 2021-11-24 RX ADMIN — MYCOPHENOLATE MOFETIL 750 MG: 250 CAPSULE ORAL at 18:40

## 2021-11-24 RX ADMIN — PANTOPRAZOLE SODIUM 40 MG: 40 TABLET, DELAYED RELEASE ORAL at 08:18

## 2021-11-24 RX ADMIN — TACROLIMUS 2.5 MG: 1 CAPSULE ORAL at 08:18

## 2021-11-24 ASSESSMENT — ACTIVITIES OF DAILY LIVING (ADL)
ADLS_ACUITY_SCORE: 12
ADLS_ACUITY_SCORE: 10
ADLS_ACUITY_SCORE: 12
ADLS_ACUITY_SCORE: 10
ADLS_ACUITY_SCORE: 12
ADLS_ACUITY_SCORE: 10
ADLS_ACUITY_SCORE: 10
ADLS_ACUITY_SCORE: 12
ADLS_ACUITY_SCORE: 12
ADLS_ACUITY_SCORE: 10
ADLS_ACUITY_SCORE: 12
ADLS_ACUITY_SCORE: 10
ADLS_ACUITY_SCORE: 12
ADLS_ACUITY_SCORE: 10
ADLS_ACUITY_SCORE: 10

## 2021-11-24 ASSESSMENT — MIFFLIN-ST. JEOR: SCORE: 1187.79

## 2021-11-24 NOTE — PROGRESS NOTES
Calorie Count  Intake recorded for: 11/23  Total Kcals: 101 Total Protein: 8g  Kcals from Hospital Food: 101   Protein: 8g  Kcals from Outside Food (average):0 Protein: 0g  # Meals Ordered from Kitchen: 3  # Meals Recorded: 1 (First - 50% cheerios, skim milk, 25% Greek yogurt)  # Supplements Recorded: no intake recorded

## 2021-11-24 NOTE — PLAN OF CARE
"/77 (BP Location: Left arm)   Pulse 68   Temp 97.5  F (36.4  C) (Oral)   Resp 16   Ht 1.651 m (5' 5\")   Wt 60.7 kg (133 lb 12.8 oz)   LMP  (LMP Unknown)   SpO2 100%   BMI 22.27 kg/m      Shift: 2637-3027  VS: VSS on RA, afebrile  Neuro: AOx4 - pleasant.   BG: ACHS, last check 138.   Labs: Potassium recheck this AM - pending.   Respiratory: Diminished at bases.   Pain/Nausea/PRN: Oxycodone x2. Denies nausea.   Diet: Regular w/ brandon counts - NJ w/ TF @40.   LDA: NJ w/ continuous feeds. PIV SL. internal jugular SL. 2 JPs on RLQ - leaky. Dressings changed twice.   GI/: Having loose stools - last BM 11/23, looked a little red in color but it could have been leakage from ROMA bulb (found it open once helping pt back to bed). Voids spontaneously.   Skin: Clamshell incision WDL. Bruising on forearms and chest.   Mobility: SBA.   Plan: Will continue to monitor and notify the team with any changes.     Handoff given to following RN.    "

## 2021-11-24 NOTE — DISCHARGE SUMMARY
[facility]    Discharge Summary  Transplant Surgery    Date of Admission:  2021  Date of Discharge:  2021  4:12 PM  Discharging Provider: Shannan Umanzor PA-C/Dr. Rojas    Discharge Diagnoses   Active Problems:    PSC (primary sclerosing cholangitis)    Encephalopathy    Hepatic encephalopathy (H)    Chronic liver failure without hepatic coma (H)    Immunosuppressed status (H)    S/P liver transplant (H)      Procedure/Surgery Information    21  donor liver transplant with closure of the abdomen with ABThera device. Drainage of abscess on the right upper quadrant adrenal gland region  21 Exploration, abdominal washout, and closure of abdomen.     History of Present Illness   Berta Nava is a 56 year old female with PMH significant for PSC/UC, Sjogren's syndrome, HTN, liver transplant aborted in 2021 d/t V tach. S/p  donor liver transplant with biliary stent on 21, complicated by coagulopathy and finding of intra-abdominal abscess. Return to OR 21 for washout and abdominal closure.    Hospital Course   Berta Nava was admitted on 2021.  The following problems were addressed during her hospitalization:    Liver transplant 21:   LFTs trended down as expected after transplant. Both drains removed prior to discharge.   US: left hepatic artery and left portal vein not visualized due to limited sonographic windows.   Repeat US post closure: normal, patent doppler.    Immunosuppression:  Induction immunosuppression with steroids.  Maintenance immunosupression with mycophenolate and tacrolimus.  Tacrolimus level goal 8-10 (12 hour trough).  Fluconazole stopped . Level supratherapeutic at 19.9 on day of discharge, dose was reduced to 1 mg BID. Infectious prophylaxis with bactrim (x 6 months) and valganciclovir (x 12 weeks).      Transplant coordinator: Vanessa Durant [phone].  Biliary  stent: yes  Donor status: DBD  CMV D + / R -  EBV D + / R +  Anticoagulation plan:     Circulatory failure/ Hypotension: weaned off pressors 11/20.     Post-op mechanical ventilation: Extubated 11/20. RA.     Anemia of chronic disease and acute anemia due to blood loss: Hgb stable ~8. Last blood transfusion on 11/21  Thrombocytopenia: Secondary to liver disease. Normalized prior to discharge.  Coagulopathy: INR normalized, will no longer check daily     Severe malnutrition in the context of acute on chronic illness: Regular diet as tolerated. NJ in place, continue tube feeds.      Post op ileus: Continue bowel regimen.    GONZALO: Resolved. Cr increased to 1.7 post op. Cr improved to 0.8 at the time of discharge.     Hypervolemia: Initialized diuresis with Lasix gtt 10/hr +metolazone 5 with good results, then switched to Lasix 20 mg daily. Discharged on lasix 20 mg daily    Hypokalemia: Replete    Steroid induced hyperglycemia: Hgb A1c 5.4. Insulin gtt required initially, now discontinued. No insulin required on discharge.     Leukocytosis: WBC stable ~17. Repeat UA/UC and Blood cultures- NGTD.     Intra abdominal abscess: Noted in OR on 11/18. Gram stain + GPC. Culture Staph epi. Completed Vanco x 7 days, will transition to Doxycycline 100 mg BID x 7 days. Continued zosyn x4 days post closure. Fluconazole complete 11/25.    Discharge Disposition   Discharged to home   Condition at discharge: Stable    Pending Results   Final pathology results:   -Advanced biliary type cirrhosis, Laennec fibrosis stage 4c, compatible with primary sclerosing cholangitis  - No hepatocellular or other malignant neoplasm identified  - Iron stain show no significant iron deposition  - PAS/PAS-D stains show no abnormal cytoplasmic accumulations  - Gallbladder with no significant histopathologic abnormality     B. GALLBLADDER, DONOR CHOLECYSTECTOMY:   - Gallbladder with mild chronic cholecystitis  - One benign lymph node (0/1)    These  results will be followed up by transplant team  Unresulted Labs Ordered in the Past 30 Days of this Admission     Date and Time Order Name Status Description    11/24/2021 10:39 AM Blood Culture Line, venous Preliminary     11/24/2021 10:39 AM Blood Culture Arm, Left Preliminary     11/18/2021  9:55 AM Fungal or Yeast Culture Routine Preliminary     11/18/2021  9:05 AM Prepare red blood cells (unit) Preliminary     11/18/2021  9:05 AM Prepare red blood cells (unit) Preliminary     11/18/2021  9:05 AM Prepare red blood cells (unit) Preliminary     11/18/2021  9:05 AM Prepare red blood cells (unit) Preliminary     11/18/2021  9:05 AM Prepare red blood cells (unit) Preliminary     11/18/2021  9:05 AM Prepare red blood cells (unit) Preliminary     11/18/2021  9:05 AM Prepare red blood cells (unit) Preliminary     11/18/2021  9:05 AM Prepare red blood cells (unit) Preliminary     11/18/2021  9:05 AM Prepare red blood cells (unit) Preliminary     11/18/2021  9:05 AM Prepare red blood cells (unit) Preliminary     11/18/2021  8:41 AM Fungal or Yeast Culture Routine Preliminary     11/18/2021  8:38 AM Prepare plasma (unit) Preliminary     11/18/2021  8:38 AM Prepare plasma (unit) Preliminary     11/18/2021  8:38 AM Prepare plasma (unit) Preliminary     11/18/2021  8:38 AM Prepare plasma (unit) Preliminary     11/18/2021  8:38 AM Prepare plasma (unit) Preliminary     11/18/2021  8:38 AM Prepare plasma (unit) Preliminary     11/18/2021  8:38 AM Prepare plasma (unit) Preliminary     11/18/2021  8:38 AM Prepare plasma (unit) Preliminary     11/18/2021  8:38 AM Prepare plasma (unit) Preliminary     11/18/2021  8:38 AM Prepare plasma (unit) Preliminary     11/18/2021  8:38 AM Prepare plasma (unit) Preliminary     11/17/2021  4:18 PM Fungus Culture, non-blood Preliminary           Primary Care Physician   Tila Nevarez    Physical Exam                      Vitals:    11/25/21 0100 11/26/21 0100 11/27/21 0400   Weight: 59.9 kg  (132 lb) 59.3 kg (130 lb 11.2 oz) 58.7 kg (129 lb 8 oz)     Vital Signs with Ranges     I/O last 3 completed shifts:  In: 740 [P.O.:240; NG/GT:100]  Out: -     Constitutional: Awake, alert, cooperative, no apparent distress, and appears stated age.  Eyes: Lids and lashes normal, pupils equal, round, extra ocular muscles intact, sclera clear, conjunctiva normal.  ENT: Normocephalic, without obvious abnormality, atraumatic  Respiratory: Nonlabored resps on RA  Cardiovascular: RRR  GI: Soft, non-distended, non-tender, incision healing  Genitourinary:  Voiding  Skin: No bruising or bleeding, normal skin color, texture  Musculoskeletal: There is no redness, warmth, or swelling of the joints.   Neurologic: Awake, alert, oriented to name, place and time.   Neuropsychiatric: Calm, normal eye contact, alert, normal affect, oriented to self, place, time and situation, memory for past and recent events intact and thought process normal.    Consultations This Hospital Stay   CARDIOLOGY GENERAL ADULT IP CONSULT  GI HEPATOLOGY ADULT IP CONSULT  SOCIAL WORK IP CONSULT  NUTRITION SERVICES ADULT IP CONSULT  PHYSICAL THERAPY ADULT IP CONSULT  OCCUPATIONAL THERAPY ADULT IP CONSULT  INTERNAL MEDICINE PROCEDURE TEAM ADULT IP CONSULT [facility]  CARE MANAGEMENT / SOCIAL WORK IP CONSULT  PHYSICAL THERAPY ADULT IP CONSULT  OCCUPATIONAL THERAPY ADULT IP CONSULT  NUTRITION SERVICES ADULT IP CONSULT  OCCUPATIONAL THERAPY ADULT IP CONSULT  PHYSICAL THERAPY ADULT IP CONSULT  PHARMACY IP CONSULT  SOT MEDICATION HISTORY IP PHARMACY CONSULT  PHARMACY TO DOSE VANCO  NUTRITION SERVICES ADULT IP CONSULT  NUTRITION SERVICES ADULT IP CONSULT  PHARMACY IP CONSULT  VASCULAR ACCESS CARE ADULT IP CONSULT  PHARMACY IP CONSULT    Time Spent on this Encounter   I have spent greater than 30 minutes on this discharge.    Discharge Orders      Home care nursing referral      Home infusion referral      MD face to face encounter    Documentation of  Face to Face and Certification for Home Health Services    I certify that patient: Berta Nava is under my care and that I, or a nurse practitioner or physician's assistant working with me, had a face-to-face encounter that meets the physician face-to-face encounter requirements with this patient on: November 22, 2021.    This encounter with the patient was in whole, or in part, for the following medical condition, which is the primary reason for home health care: I certify that, based on my findings, the following services are medically necessary home health services: Nursing, and Physical Therapy    My clinical findings support the need for the above services because: Skilled nursing visits to monitor cardiac and resp status. Monitor hydration, nutrition, urinary and bowel status. Monitor healing of incision. Instruct in medications and eval effects. Assist / teach patient to obtain and record lab results in handbook. Further, I certify that my clinical findings support that this patient is homebound (i.e. absences from home require considerable and taxing effort and are for medical reasons or Muslim services or infrequently or of short duration when for other reasons) because: Leaving home is medically contraindicated for the following reason(s): Infection risk / immunocompromised state where it is safer for them to receive services in the home.   .  I certify that, based on my findings, the following services are medically necessary home health services: Nursing and Physical Therapy.    Further, I certify that my clinical findings support that this patient is homebound (i.e. absences from home require considerable and taxing effort and are for medical reasons or Muslim services or infrequently or of short duration when for other reasons) because: Requires assistance of another person or specialized equipment to access medical services because patient: Requires supervision of another for safe  transfer...    Based on the above findings. I certify that this patient is confined to the home and needs intermittent skilled nursing care, physical therapy and/or speech therapy.  The patient is under my care, and I have initiated the establishment of the plan of care.  This patient will be followed by a physician who will periodically review the plan of care.  Physician/Provider to provide follow up care: Tila Nevarez    Attending hospital physician (the Medicare certified [place] provider): Nasir Rojas MD  Physician Signature: See electronic signature associated with these discharge orders.  Date: 2021     Reason for your hospital stay    Patient underwent  donor liver transplant on 21.     Activity    Your activity upon discharge: Walk at least four times a day, lift no greater than 10 pounds for 6 weeks from the time of surgery.  After 6 weeks time please return to your normal exercise routine.  No driving while taking narcotics or until 3 weeks after surgery.     Monitor and record    Food intake to bring with to follow up appointments     When to contact your care team    Call your transplant coordinator at [phone] if you have any of the following: sustained temperature greater than 100.4 or isolated fever spike to 101.5, increased pain over graft  or difficulty obtaining or taking your transplant medications.    If it is outside of office hours, please call the hospital switchboard at [phone] and ask to have the transplant surgery fellow paged for urgent medical questions.     Adult [facility] Follow-up and recommended labs and tests    You will be seen in the clinic for follow up per protocol. DO NOT take tacrolimus (Prograf) until after your labs are drawn. Remember to bring all of your medications with you to this appointment.      LABS:  Transplant labs (CBC, BMP, hepatic panel, mag, phos, and tacrolimus levels (12 hours after administration)) to be drawn twice  weekly for 4 weeks (you should be called on Monday with appt times).    FOLLOW UP APPOINTMENTS:  Remember to always bring an updated medication list to all appointments.     Follow up with your transplant surgeon, Dr. Schmitt, in 2 weeks.  Follow up with transplant hepatology per protocol.    Follow up with primary care provider in 2-4 weeks. (Pt to schedule)  Your sutures will be removed 3 weeks after your operation.  You have a biliary stent in place.  Your coordinator will follow up in 6-8 weeks.    Call scheduling at [phone] if you have not heard about your appointments within 48 hours after discharge.     Wound care and dressings    Instructions to care for your wound at home: Keep wound clean and dry.  Staples/sutures may be removed 3 weeks post-op.  Pt may shower but do not soak incision in pool, hot tub, or bath until drain site is healed.     Tubes and drains    You are going home with the following tubes or drains: feeding tube NJ-Tube. Tube cares per hospital or home care instructions     Diet    Follow this diet upon discharge:   Ensure Enlive; Between Meals    Adult Formula Drip Feeding: Continuous TwoCal HN; Nasoduodenal tube; Goal Rate: 40; mL/hr    Regular Diet Adult     Discharge Medications   Discharge Medication List as of 11/27/2021  1:15 PM      START taking these medications    Details   aspirin (ASA) 325 MG tablet 1 tablet (325 mg) by Oral or Feeding Tube route daily, Disp-30 tablet, R-11, E-Prescribe      doxycycline hyclate (VIBRAMYCIN) 100 MG capsule Take 1 capsule (100 mg) by mouth every 12 hours for 5 days, Disp-10 capsule, R-0, E-Prescribe      Guar Gum (FIBER MODULAR, NUTRISOURCE FIBER,) packet 1 packet by Per Feeding Tube route 3 times daily, Disp-75 packet, R-0, No Print Out      magnesium oxide (MAG-OX) 400 MG tablet Take 1 tablet (400 mg) by mouth 2 times daily, Disp-60 tablet, R-11, E-Prescribe      mycophenolate (GENERIC EQUIVALENT) 250 MG capsule Take 3 capsules (750 mg)  by mouth 2 times daily, Disp-180 capsule, R-11, E-Prescribe      oxyCODONE (ROXICODONE) 5 MG tablet 1 tablet (5 mg) by Oral or Feeding Tube route every 4 hours as needed for moderate to severe pain, Disp-15 tablet, R-0, E-Prescribe      pantoprazole (PROTONIX) 40 MG EC tablet Take 1 tablet (40 mg) by mouth 2 times daily, Disp-60 tablet, R-11, E-Prescribe      phosphorus tablet 250 mg (PHOSPHA 250 NEUTRAL) 250 MG per tablet Take 1 tablet (250 mg) by mouth daily, Disp-30 tablet, R-0, E-Prescribe      polyethylene glycol (MIRALAX) 17 GM/Dose powder Take 17 g by mouth daily HOLD for loose stools, Disp-510 g, R-0, E-Prescribe      senna-docusate (SENOKOT-S/PERICOLACE) 8.6-50 MG tablet Take 1 tablet by mouth 2 times daily HOLD for loose stools, Disp-60 tablet, R-0, E-Prescribe      sulfamethoxazole-trimethoprim (BACTRIM) 400-80 MG tablet Take 1 tablet by mouth daily, Disp-30 tablet, R-5, E-Prescribe      tacrolimus (GENERIC EQUIVALENT) 0.5 MG capsule Take 1 capsule by mouth twice daily as needed for dose titration, Disp-60 capsule, R-1, E-Prescribe      tacrolimus (GENERIC EQUIVALENT) 1 MG capsule Take 1 capsule (1 mg) by mouth 2 times daily, Disp-60 capsule, R-11, E-Prescribe      valGANciclovir (VALCYTE) 450 MG tablet Take 1 tablet (450 mg) by mouth daily, Disp-30 tablet, R-5, E-Prescribe         CONTINUE these medications which have CHANGED    Details   furosemide (LASIX) 20 MG tablet Take 1 tablet (20 mg) by mouth daily, Disp-30 tablet, R-3, E-Prescribe      ursodiol (ACTIGALL) 300 MG capsule Take 1 capsule (300 mg) by mouth 2 times daily, Disp-60 capsule, R-11, E-Prescribe         CONTINUE these medications which have NOT CHANGED    Details   multivitamin (CENTRUM SILVER) tablet Take 1 tablet by mouth daily, Historical      ondansetron (ZOFRAN-ODT) 4 MG ODT tab Take 1 tablet (4 mg) by mouth every 8 hours as needed for nausea, Disp-20 tablet, R-1, E-Prescribe      protein modular (PROSOURCE TF) LIQD 1 packet by Per  Feeding Tube route daily, Disp-30 packet, R-0, E-Prescribe         STOP taking these medications       ciprofloxacin (CIPRO) 500 MG tablet Comments:   Reason for Stopping:         lactulose (CEPHULAC) 10 GM packet Comments:   Reason for Stopping:         omeprazole (PRILOSEC) 20 MG DR capsule Comments:   Reason for Stopping:         spironolactone (ALDACTONE) 50 MG tablet Comments:   Reason for Stopping:         sucralfate (CARAFATE) 1 GM tablet Comments:   Reason for Stopping:             Allergies   Allergies   Allergen Reactions     Diagnostic X-Ray Materials Hives     PATIENT HAD HIVE REACTION AFTER ADMINISTERING CT CONTRAST DYE.       Contrast Dye      Data   Most Recent 2 LFT's:  Recent Labs   Lab Test 11/27/21  0618 11/26/21  0649   AST 9 11   ALT 24 27   ALKPHOS 73 86   BILITOTAL 0.6 0.7

## 2021-11-24 NOTE — TELEPHONE ENCOUNTER
"A pharmacist spent 45 minutes on the phone providing medication teaching with Berta Nava and Karin (daughter) for discharge with a focus on new medications/dose changes.  The discharge medication list was reviewed with the patient/family and the following points were discussed, as applicable: Name, description, purpose, dose/strength, duration of medications, common side effects, food/medications to avoid, action to be taken if dose is missed, when to call MD and how to obtain refills.  Berta said that she felt \"good.\"  The patient will be responsible for managing medications. Karin was in on the education and can assist if needed. Additionally, the following transplant related education was covered: Purpose of medication card, Medication videos, Timing of medications and day of lab draw considerations , Emesis or missed dose, Prescription Insurance  and Discharge process for receiving meds   Patient will  transplant supplies including 7 day pill organizer at discharge pharmacy along with medications. She already has a BP monitor. Patient will use local pharmacy or other mail order for outpatient medications. - local [facility].  Clinical Pharmacy Consult:                                                      Transplant Specific:   Date of Transplant: 11/18/2021  Type of Transplant: liver  First Transplant2.5: yes  History of rejection: no    Immunosuppression Regimen   TAC 2.5mg qAM & 2.5mg qPM, Prednisone 5mg qAM (until Tacrolimus is therapeutic), and MMF 750mg qAM & 750mg qPM  Patient specific goal: 8-10  Most recent level: 5.8, date 11/24/21  Immunosuppressant Levels:  Subtherapeutic  Pt adherent to lab draws: yes  Scr:   Lab Results   Component Value Date    CR 0.74 11/24/2021    CR 0.69 06/25/2021     Side effects: Diarrhea and Edema    Prophylactic Medications  Antibacterial:  Bactrim 400-80mg daily  Scheduled Discontinue Date: 6 months    Antifungal: Not needed thus far  Scheduled Discontinue " Date: N/A    Antiviral: Max dose in Liver transplant is Valcyte 450mg once daily   Scheduled Discontinue Date: 3 months    Acid Reducer: Protonix (pantoprazole)  Scheduled Reviewed Date: Lifelong possibly, since was on Omeprazole prior to admission    Thrombosis Prevention: Aspirin 325 mg PO daily  Scheduled Discontinue Date:     Blood Pressure Management  Frequency of home Blood Pressure checks: twice daily  Most recent home BP: 113/80  Patient Blood pressure goal:   Patient blood pressure at goal:  yes  Hospitalizations/ER visits since last assessment: 0      Med rec/DUR performed: yes  Med Rec Discrepancies: no    Reminders:    1. Bring to first clinic appt: med box, med card, bp monitor, all medications being taken, and lab book.  2.   MTM pharmacist visit on first clinic appt and if ok, again in 3 to 4 months during follow up appt.  3.   Avoid Grapefruit and Grapefruit juice.   4.   Avoid herbal supplements. If wish to take other medications or supplements, call your coordinator.   5.   Keep lab appts.   6.   Can use apps on phone like [employer] to help manage medication lists and reminders.   7.   Make sure you are protecting your skin by wearing long sleeves and applying sunscreen to exposed skin, for any significant time in the sun.     Transplant Coordinator is Vanessa Durant.       Dana Trujillo [facility]

## 2021-11-24 NOTE — PROGRESS NOTES
Care Management Follow Up    Length of Stay (days): 7    Expected Discharge Date: 11/26/2021     Concerns to be Addressed: care coordination/care conferences,discharge planning     Patient plan of care discussed at interdisciplinary rounds: Yes    Anticipated Discharge Disposition: Home Care,Home Infusion,Home     Anticipated Discharge Services: None  Anticipated Discharge DME: None    Patient/family educated on Medicare website which has current facility and service quality ratings: yes  Education Provided on the Discharge Plan:    Patient/Family in Agreement with the Plan: yes    Referrals Placed by CM/SW: Homecare,Home Infusion,External Care Coordination    Additional Information:  Per care team rounds. Team anticipates discharge Friday. Patient may require home enteral. [facility] already accepted for home RN services.    Referral placed to [facility] infusion via e-mail for enteral feed. Team anticipates possible discharge home on Friday.      Vania Worrell RN

## 2021-11-24 NOTE — PROGRESS NOTES
Therapy: Enteral TF  Insurance:[employer]   Ded: $4000.00  Met: $4000.00    Co-Insurance:75/25  Max Out of Pocket: $6000.00  Met: $6000.00  Patient now covered 100% for the rest of 2021    Formula is NOT covered in this case since the plan requires sole source of nutrition.  TwoCal 5 cans daily is $8.30   Prosource is $1.38 per package        Please contact Intake with any questions, [phone] or In Basket pool, [facility] ([phone]).

## 2021-11-24 NOTE — PLAN OF CARE
"VS: /67 (BP Location: Left arm)   Pulse 79   Temp 98.5  F (36.9  C) (Oral)   Resp 22   Ht 1.651 m (5' 5\")   Wt 62.9 kg (138 lb 10.7 oz)   LMP  (LMP Unknown)   SpO2 95%   BMI 23.08 kg/m      Cares: 0700- 1900     Current condition: Patient is comfortable and in good spirits   Neuro: WNL   Cardio: WNL   Respiratory: WNL   GI/: Patient is voiding spontaneously   Skin: Patients skin has some irritation, and busing throughout the back, abdomen, arms, and sacral region   Diet:  Full diet, not a big appitite, consumed <25% of meals throughout the day   Labs:   B-130 throughout the shift, continuous insulin removed.   LDA:  ROMA X 2 clean and dressings changed, CVC dressing changed, Perif Line open   Mobility:  Patient was motivated and active throughout the shift ambulating several times  Pain: Pain is denied throughout the shift   PRN medications: no PRN meds used   Plan of Care:  Vanco running currently   "

## 2021-11-24 NOTE — PROGRESS NOTES
Transplant Surgery  Inpatient Daily Progress Note  2021    Assessment & Plan: Berta Nava is a 56 year old female with PMH significant for PSC/UC, Sjogren's syndrome, HTN, liver transplant aborted in 2021 d/t V tach. S/p  donor liver transplant with biliary stent on 21, complicated by coagulopathy and finding of intra-abdominal abscess. Return to OR 21 for washout and abdominal closure.    Graft function: DDLTx 21 POD #6/5 from washout/closure. Transaminases improving, TB normalized, 0.8  - US: left hepatic artery and left portal vein not visualized due to limited sonographic windows. Repeat US post closure: normal, patent doppler.  Immunosuppression management: Induction w/ steroids.  -Tacro 2.5 mg BID, titrate to a level goal 8-10. Monitor daily in setting of fluconazole,  level 4.5 (12 hour trough). Increased to 2.5 from 1.5. Diflucan was added on .  level pending.   -MMF 750mg BID   -Steroid taper per protocol, continue Prednisone 5 mg daily while Tac level is subtherapeutic.  Complexity of management: High. Contributing factors: induction.  Hematology:   ACD/ABL: Hgb stable ~8  Thrombocytopenia, acute: Plt >100  Coagulopathy: INR normalized, will no longer check daily   Cardiorespiratory:   Circulatory failure/ Hypotension: weaned off pressors .   Post-op mechanical ventilation: Extubated . RA. Continue CDB/IS  GI/Nutrition:   Post op ileus: Continue bowel regimen. BM x1 yesterday.   Severe malnutrition in the context of acute on chronic illness: Regular diet as tolerated. NJ in place, continue tube feeds.    Endocrine:   Steroid induced hyperglycemia: Hgb A1c 5.4. Insulin gtt required initially, now discontinued, ssi prn.   Fluid/Electrolytes: Hypervolemia : +3Kg, initialized diuresis with Lasix gtt 10/hr +metolazone 5 with good results, now on Lantus 20 mg daily.   GONZALO: Cr 0.7<-1.0<-1.5<-1.7. Monitor. Good UOP  Hypokalemia: K 4.3,  replaced  : Jurado removed  Infectious disease: Afebrile  Leukocytosis WBC stable ~17. Repeat UA/UC and Blood cultures.   Intra abdominal abscess: Noted in OR on 11/18. Gram stain + GPC. Culture Staph epi. Completed Vanco x 7 days, will transition to Doxycycline 100 mg BID x 7 days. Continued zosyn x4 days post closure. Continue fluconazole.   Prophylaxis: DVT(mechanical), fall, GI (PPI), Fungal ppx (Diflucan) CMV ppx (gancyclovir), PCP ppx (bactrim)  Disposition: 7A, plan for discharge home with assist    Medical Decision Making: High  Subsequent visit 46024 (high level decision making)    [place]/Fellow/Resident Provider: Jennifer Valentin PA-C [id number]    Faculty: Nasir Rojas M.D.  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    11/18/2021 (Liver), Postoperative day: 6     Interval History:  Obtain from the patient     Overnight events: NAEs.      ROS:   A 10-point review of systems was negative except as noted above.    Curent Meds:    aspirin  325 mg Oral or Feeding Tube Daily     [START ON 11/26/2021] clotrimazole  1 lozenge Buccal 4x Daily     [START ON 11/25/2021] doxycycline hyclate  100 mg Oral Q12H CAROLINE     fiber modular (NUTRISOURCE FIBER)  1 packet Per Feeding Tube TID     [START ON 11/25/2021] fluconazole  400 mg Oral Once     furosemide  20 mg Oral Daily     insulin aspart  1-7 Units Subcutaneous TID AC     insulin aspart  1-5 Units Subcutaneous At Bedtime     multivitamin w/minerals  1 tablet Oral Daily     mycophenolate  750 mg Oral BID IS     pantoprazole  40 mg Oral BID     phosphorus tablet 250 mg  500 mg Oral BID     polyethylene glycol  17 g Oral Daily     predniSONE  5 mg Oral Daily     protein modular  1 packet Per Feeding Tube Daily     senna-docusate  1  "tablet Oral BID    Or     senna-docusate  2 tablet Oral BID     sodium chloride (PF)  3 mL Intravenous Q8H     sulfamethoxazole-trimethoprim  1 tablet Oral Daily     tacrolimus  2.5 mg Oral BID IS     [START ON 11/25/2021] valGANciclovir  450 mg Oral Daily     vancomycin  1,000 mg Intravenous Q24H       Physical Exam:     Admit Weight: 57.6 kg (127 lb)    Current Vitals:   /80 (BP Location: Left arm)   Pulse 68   Temp 97.8  F (36.6  C) (Oral)   Resp 16   Ht 1.651 m (5' 5\")   Wt 60.7 kg (133 lb 12.8 oz)   LMP  (LMP Unknown)   SpO2 100%   BMI 22.27 kg/m      Vital sign ranges:    Temp:  [97.5  F (36.4  C)-98.8  F (37.1  C)] 97.8  F (36.6  C)  Pulse:  [68-79] 68  Resp:  [16-22] 16  BP: (113-138)/(67-81) 113/80  SpO2:  [95 %-100 %] 100 %  Patient Vitals for the past 24 hrs:   BP Temp Temp src Pulse Resp SpO2 Weight   11/24/21 0716 113/80 97.8  F (36.6  C) Oral 68 16 100 % --   11/24/21 0404 134/77 97.5  F (36.4  C) Oral 68 16 100 % --   11/24/21 0100 -- -- -- -- -- -- 60.7 kg (133 lb 12.8 oz)   11/23/21 2244 138/81 97.6  F (36.4  C) Oral 68 16 99 % --   11/23/21 1914 125/76 98.8  F (37.1  C) Oral 69 16 100 % --   11/23/21 1500 127/67 98.5  F (36.9  C) Oral 79 22 95 % --     General Appearance: NAD  Skin: warm, dry, no jaundice. Scattered ecchymosis. See abd exam  Heart: regular rate and rhythm  Lungs: NLB on RA  Abdomen: soft, no guarding, incision with sutures intact with minimal s/s drainage at trifurcation. ROMA x2, serosang drainage x1 and milkly drainage in other.   : Voiding.   Extremities: edema: BRIGHT +2 bilaterally  Neurologic: A0x3.    Frailty Scores     Frailty Scores 3/23/2021 3/13/2021    Final Score Not Frail Not Frail    Final Score Number 0 0          Data:   CMP  Recent Labs   Lab 11/24/21  1236 11/24/21  0812 11/24/21  0550 11/23/21  0859 11/23/21  0850 11/23/21  0551 11/23/21  0538 11/22/21  0419 11/22/21  0418 11/21/21  0331 11/21/21  0318 11/19/21  0541 11/19/21  0533 11/18/21  1559 " 11/18/21  1446   NA  --   --  135  --  134  --   --   --   --    < > 134   < > 140  --  143   POTASSIUM  --   --  4.3  --  3.3*  --   --    < >  --    < > 2.8*   < > 3.6   < > 3.2*   CHLORIDE  --   --  106  --  105  --   --   --   --    < > 105   < > 111*  --  110*   CO2  --   --  21  --  21  --   --   --   --    < > 21   < > 22  --  26   * 90 117*   < > 122*   < >  --    < >  --    < > 135*   < > 128*   < > 128*   BUN  --   --  49*  --  56*  --   --   --   --    < > 39*   < > 21  --  14   CR  --   --  0.74  --  1.00  --   --    < >  --    < > 1.64*   < > 0.89  --  0.55   GFRESTIMATED  --   --  90  --  62  --   --    < >  --    < > 34*   < > 72  --  >90   MARIELENA  --   --  8.0*  --  8.0*  --   --   --   --    < > 7.5*   < > 8.6  --  9.3   ICAW  --   --   --   --   --   --   --   --  4.6  --  4.6   < > 4.9  --   --    MAG  --   --  2.0  --   --   --  2.2   < >  --   --  1.9   < > 1.8   < > 1.7   PHOS  --   --  1.5*  --   --   --  1.9*   < >  --   --  4.8*   < > 3.9   < > 2.7   AMYLASE  --   --   --   --   --   --   --   --   --   --   --   --  36  --  31   LIPASE  --   --   --   --   --   --   --   --   --   --   --   --  83  --  156   ALBUMIN  --   --  1.8*  --  2.1*  --  1.8*   < >  --    < > 2.0*   < > 2.5*  --  1.6*  1.6*   BILITOTAL  --   --  0.8  --  1.0  --  0.8   < >  --    < > 0.8   < > 1.3  --  3.1*  3.1*   ALKPHOS  --   --  58  --  61  --  55   < >  --    < > 37*   < > 36*  --  41  41   AST  --   --  8  --  9  --  8   < >  --    < > 22   < > 98*  --  280*  280*   ALT  --   --  36  --  51*  --  45   < >  --    < > 79*   < > 140*  --  200*  200*    < > = values in this interval not displayed.     CBC  Recent Labs   Lab 11/24/21  0550 11/23/21  0951 11/19/21  0127 11/19/21  0126   HGB 8.5* 8.7*   < >  --    WBC 17.7* 17.0*   < >  --     112*   < >  --    A1C  --   --   --  5.4    < > = values in this interval not displayed.     COAGS  Recent Labs   Lab 11/22/21  0419 11/21/21  0318   INR 1.22*  1.34*   PTT 29 31      Urinalysis  Recent Labs   Lab Test 11/20/21  1030 11/06/21  0509 06/10/21  0632 05/21/21  0730   COLOR Yellow Dark Yellow*   < > Dark Yellow   APPEARANCE Clear Clear   < > Clear   URINEGLC Negative Negative   < > Negative   URINEBILI Negative Moderate*   < > Negative   URINEKETONE Negative Negative   < > Negative   SG 1.020 1.018   < > 1.031   UBLD Small* Negative   < > Negative   URINEPH 5.0 5.5   < > 6.0   PROTEIN 30 * Negative   < > 20*   NITRITE Negative Negative   < > Negative   LEUKEST Negative Negative   < > Negative   RBCU 7* <1   < > 1   WBCU 3 1   < > 4   UTPG  --   --   --  0.26*    < > = values in this interval not displayed.     Virology:  Hepatitis C Antibody   Date Value Ref Range Status   11/17/2021 Nonreactive Nonreactive Final

## 2021-11-24 NOTE — PLAN OF CARE
"VS: /80 (BP Location: Left arm)   Pulse 68   Temp 97.8  F (36.6  C) (Oral)   Resp 16   Ht 1.651 m (5' 5\")   Wt 60.7 kg (133 lb 12.8 oz)   LMP  (LMP Unknown)   SpO2 100%   BMI 22.27 kg/m      Cares:    Current condition:   Neuro: alert and oriented X4   Cardio: regular rate and rhythm   Respiratory: RA   GI/: bowel sounds present had X1 loose stool   Skin: clam shell CDI, has bruising all over, +2 edema to BLE   Diet:   regular and TF at 40 ml cont.   BG: AC and HS   LDA:  PIV to the LFA, internal jugular, 2 Roma's on the Right   Mobility:  SBA   Pain: was given Oxycodone X3 for pain 5/6-10  PRN medications: oxycodone 5 mg   Changes:   Plan of Care:  on contact precaution for VRE, dressing to the ROMA site was changed is leaking at both sites was changed X2.   "

## 2021-11-25 LAB
ALBUMIN SERPL-MCNC: 1.7 G/DL (ref 3.4–5)
ALP SERPL-CCNC: 69 U/L (ref 40–150)
ALT SERPL W P-5'-P-CCNC: 28 U/L (ref 0–50)
ANION GAP SERPL CALCULATED.3IONS-SCNC: 6 MMOL/L (ref 3–14)
AST SERPL W P-5'-P-CCNC: 10 U/L (ref 0–45)
BACTERIA ABSC ANAEROBE+AEROBE CULT: ABNORMAL
BACTERIA ABSC ANAEROBE+AEROBE CULT: NORMAL
BACTERIA FLD CULT: NORMAL
BACTERIA UR CULT: NO GROWTH
BASOPHILS # BLD AUTO: 0 10E3/UL (ref 0–0.2)
BASOPHILS NFR BLD AUTO: 0 %
BILIRUB DIRECT SERPL-MCNC: 0.5 MG/DL (ref 0–0.2)
BILIRUB SERPL-MCNC: 0.7 MG/DL (ref 0.2–1.3)
BUN SERPL-MCNC: 36 MG/DL (ref 7–30)
CALCIUM SERPL-MCNC: 7.8 MG/DL (ref 8.5–10.1)
CHLORIDE BLD-SCNC: 105 MMOL/L (ref 94–109)
CO2 SERPL-SCNC: 24 MMOL/L (ref 20–32)
CREAT SERPL-MCNC: 0.61 MG/DL (ref 0.52–1.04)
EOSINOPHIL # BLD AUTO: 0.3 10E3/UL (ref 0–0.7)
EOSINOPHIL NFR BLD AUTO: 2 %
ERYTHROCYTE [DISTWIDTH] IN BLOOD BY AUTOMATED COUNT: 17 % (ref 10–15)
GFR SERPL CREATININE-BSD FRML MDRD: >90 ML/MIN/1.73M2
GLUCOSE BLD-MCNC: 109 MG/DL (ref 70–99)
GLUCOSE BLDC GLUCOMTR-MCNC: 103 MG/DL (ref 70–99)
GLUCOSE BLDC GLUCOMTR-MCNC: 107 MG/DL (ref 70–99)
GLUCOSE BLDC GLUCOMTR-MCNC: 109 MG/DL (ref 70–99)
GLUCOSE BLDC GLUCOMTR-MCNC: 97 MG/DL (ref 70–99)
HCT VFR BLD AUTO: 24.2 % (ref 35–47)
HGB BLD-MCNC: 7.9 G/DL (ref 11.7–15.7)
IMM GRANULOCYTES # BLD: 0.4 10E3/UL
IMM GRANULOCYTES NFR BLD: 2 %
LYMPHOCYTES # BLD AUTO: 0.8 10E3/UL (ref 0.8–5.3)
LYMPHOCYTES NFR BLD AUTO: 5 %
MAGNESIUM SERPL-MCNC: 1.5 MG/DL (ref 1.6–2.3)
MCH RBC QN AUTO: 29 PG (ref 26.5–33)
MCHC RBC AUTO-ENTMCNC: 32.6 G/DL (ref 31.5–36.5)
MCV RBC AUTO: 89 FL (ref 78–100)
MONOCYTES # BLD AUTO: 0.7 10E3/UL (ref 0–1.3)
MONOCYTES NFR BLD AUTO: 4 %
NEUTROPHILS # BLD AUTO: 14.8 10E3/UL (ref 1.6–8.3)
NEUTROPHILS NFR BLD AUTO: 87 %
NRBC # BLD AUTO: 0 10E3/UL
NRBC BLD AUTO-RTO: 0 /100
PHOSPHATE SERPL-MCNC: 2.3 MG/DL (ref 2.5–4.5)
PLATELET # BLD AUTO: 191 10E3/UL (ref 150–450)
POTASSIUM BLD-SCNC: 4 MMOL/L (ref 3.4–5.3)
PROT SERPL-MCNC: 4.6 G/DL (ref 6.8–8.8)
RBC # BLD AUTO: 2.72 10E6/UL (ref 3.8–5.2)
SODIUM SERPL-SCNC: 135 MMOL/L (ref 133–144)
TACROLIMUS BLD-MCNC: 8 UG/L (ref 5–15)
TME LAST DOSE: NORMAL H
TME LAST DOSE: NORMAL H
WBC # BLD AUTO: 17.1 10E3/UL (ref 4–11)

## 2021-11-25 PROCEDURE — 250N000013 HC RX MED GY IP 250 OP 250 PS 637: Performed by: SURGERY

## 2021-11-25 PROCEDURE — 85025 COMPLETE CBC W/AUTO DIFF WBC: CPT | Performed by: PHYSICIAN ASSISTANT

## 2021-11-25 PROCEDURE — 250N000013 HC RX MED GY IP 250 OP 250 PS 637: Performed by: PHYSICIAN ASSISTANT

## 2021-11-25 PROCEDURE — 82040 ASSAY OF SERUM ALBUMIN: CPT | Performed by: PHYSICIAN ASSISTANT

## 2021-11-25 PROCEDURE — 250N000012 HC RX MED GY IP 250 OP 636 PS 637: Performed by: PHYSICIAN ASSISTANT

## 2021-11-25 PROCEDURE — 99232 SBSQ HOSP IP/OBS MODERATE 35: CPT | Mod: 24 | Performed by: TRANSPLANT SURGERY

## 2021-11-25 PROCEDURE — 84100 ASSAY OF PHOSPHORUS: CPT | Performed by: PHYSICIAN ASSISTANT

## 2021-11-25 PROCEDURE — 250N000013 HC RX MED GY IP 250 OP 250 PS 637: Performed by: STUDENT IN AN ORGANIZED HEALTH CARE EDUCATION/TRAINING PROGRAM

## 2021-11-25 PROCEDURE — 250N000011 HC RX IP 250 OP 636: Performed by: SURGERY

## 2021-11-25 PROCEDURE — 250N000013 HC RX MED GY IP 250 OP 250 PS 637: Performed by: TRANSPLANT SURGERY

## 2021-11-25 PROCEDURE — 83735 ASSAY OF MAGNESIUM: CPT | Performed by: PHYSICIAN ASSISTANT

## 2021-11-25 PROCEDURE — 80048 BASIC METABOLIC PNL TOTAL CA: CPT | Performed by: PHYSICIAN ASSISTANT

## 2021-11-25 PROCEDURE — 120N000011 HC R&B TRANSPLANT UMMC

## 2021-11-25 PROCEDURE — 80197 ASSAY OF TACROLIMUS: CPT | Performed by: PHYSICIAN ASSISTANT

## 2021-11-25 PROCEDURE — 36592 COLLECT BLOOD FROM PICC: CPT | Performed by: PHYSICIAN ASSISTANT

## 2021-11-25 PROCEDURE — 250N000011 HC RX IP 250 OP 636: Performed by: PHYSICIAN ASSISTANT

## 2021-11-25 RX ORDER — MAGNESIUM SULFATE HEPTAHYDRATE 40 MG/ML
2 INJECTION, SOLUTION INTRAVENOUS ONCE
Status: COMPLETED | OUTPATIENT
Start: 2021-11-25 | End: 2021-11-25

## 2021-11-25 RX ORDER — MAGNESIUM OXIDE 400 MG/1
400 TABLET ORAL 2 TIMES DAILY
Status: DISCONTINUED | OUTPATIENT
Start: 2021-11-25 | End: 2021-11-27 | Stop reason: HOSPADM

## 2021-11-25 RX ADMIN — PANTOPRAZOLE SODIUM 40 MG: 40 TABLET, DELAYED RELEASE ORAL at 20:55

## 2021-11-25 RX ADMIN — DOCUSATE SODIUM 50 MG AND SENNOSIDES 8.6 MG 1 TABLET: 8.6; 5 TABLET, FILM COATED ORAL at 20:55

## 2021-11-25 RX ADMIN — Medication 1 PACKET: at 20:57

## 2021-11-25 RX ADMIN — TACROLIMUS 2.5 MG: 1 CAPSULE ORAL at 08:53

## 2021-11-25 RX ADMIN — SODIUM PHOSPHATE, DIBASIC, ANHYDROUS, POTASSIUM PHOSPHATE, MONOBASIC, AND SODIUM PHOSPHATE, MONOBASIC, MONOHYDRATE 500 MG: 852; 155; 130 TABLET, COATED ORAL at 20:54

## 2021-11-25 RX ADMIN — Medication 400 MG: at 20:55

## 2021-11-25 RX ADMIN — Medication 400 MG: at 13:28

## 2021-11-25 RX ADMIN — OXYCODONE HYDROCHLORIDE 5 MG: 5 TABLET ORAL at 01:16

## 2021-11-25 RX ADMIN — FUROSEMIDE 20 MG: 20 TABLET ORAL at 08:53

## 2021-11-25 RX ADMIN — OXYCODONE HYDROCHLORIDE 5 MG: 5 TABLET ORAL at 10:18

## 2021-11-25 RX ADMIN — MAGNESIUM SULFATE HEPTAHYDRATE 2 G: 40 INJECTION, SOLUTION INTRAVENOUS at 10:10

## 2021-11-25 RX ADMIN — OXYCODONE HYDROCHLORIDE 5 MG: 5 TABLET ORAL at 22:09

## 2021-11-25 RX ADMIN — FLUCONAZOLE 400 MG: 200 TABLET ORAL at 08:53

## 2021-11-25 RX ADMIN — DOXYCYCLINE HYCLATE 100 MG: 100 CAPSULE ORAL at 20:54

## 2021-11-25 RX ADMIN — PREDNISONE 5 MG: 5 TABLET ORAL at 08:53

## 2021-11-25 RX ADMIN — DOXYCYCLINE HYCLATE 100 MG: 100 CAPSULE ORAL at 08:53

## 2021-11-25 RX ADMIN — ASPIRIN 325 MG ORAL TABLET 325 MG: 325 PILL ORAL at 08:52

## 2021-11-25 RX ADMIN — Medication 1 TABLET: at 08:52

## 2021-11-25 RX ADMIN — VALGANCICLOVIR 450 MG: 450 TABLET, FILM COATED ORAL at 08:53

## 2021-11-25 RX ADMIN — Medication 1 PACKET: at 13:58

## 2021-11-25 RX ADMIN — HYDROMORPHONE HYDROCHLORIDE 0.2 MG: 0.2 INJECTION, SOLUTION INTRAMUSCULAR; INTRAVENOUS; SUBCUTANEOUS at 03:56

## 2021-11-25 RX ADMIN — PANTOPRAZOLE SODIUM 40 MG: 40 TABLET, DELAYED RELEASE ORAL at 08:52

## 2021-11-25 RX ADMIN — SODIUM PHOSPHATE, DIBASIC, ANHYDROUS, POTASSIUM PHOSPHATE, MONOBASIC, AND SODIUM PHOSPHATE, MONOBASIC, MONOHYDRATE 500 MG: 852; 155; 130 TABLET, COATED ORAL at 08:52

## 2021-11-25 RX ADMIN — TACROLIMUS 2.5 MG: 1 CAPSULE ORAL at 17:47

## 2021-11-25 RX ADMIN — OXYCODONE HYDROCHLORIDE 5 MG: 5 TABLET ORAL at 06:16

## 2021-11-25 RX ADMIN — OXYCODONE HYDROCHLORIDE 5 MG: 5 TABLET ORAL at 15:58

## 2021-11-25 RX ADMIN — MYCOPHENOLATE MOFETIL 750 MG: 250 CAPSULE ORAL at 08:52

## 2021-11-25 RX ADMIN — SULFAMETHOXAZOLE AND TRIMETHOPRIM 1 TABLET: 400; 80 TABLET ORAL at 08:53

## 2021-11-25 RX ADMIN — MYCOPHENOLATE MOFETIL 750 MG: 250 CAPSULE ORAL at 17:51

## 2021-11-25 RX ADMIN — Medication 1 PACKET: at 08:55

## 2021-11-25 RX ADMIN — Medication 1 PACKET: at 08:54

## 2021-11-25 ASSESSMENT — ACTIVITIES OF DAILY LIVING (ADL)
ADLS_ACUITY_SCORE: 10

## 2021-11-25 ASSESSMENT — MIFFLIN-ST. JEOR: SCORE: 1179.63

## 2021-11-25 NOTE — PLAN OF CARE
VSS on room air. Pain managed with oxy x 2 and iv dilaudid x1. Voiding. UA sent. No BM this shift. Minimal output from ROMA drains. Leaky at site. Bandages changed. Regular diet. Poor appetite. Tube feeds @40ml/hr. PIV and internal jugular saline locked. BG stable. SBA. Calling appropriately. Possible discharge to home tomorrow.

## 2021-11-25 NOTE — PROGRESS NOTES
Calorie Count  Intake recorded for: 11/24  Total Kcals: 0 Total Protein: 0g  Kcals from Hospital Food: 0   Protein: 0g  Kcals from Outside Food (average):0 Protein: 0g  # Meals Recorded: 1 meal ordered from kitchen, no intake recorded  # Supplements Recorded: no intake recorded

## 2021-11-25 NOTE — PROGRESS NOTES
Transplant Surgery  Inpatient Daily Progress Note  2021    Assessment & Plan: Berta Nava is a 56 year old female with PMH significant for PSC/UC, Sjogren's syndrome, HTN, liver transplant aborted in 2021 d/t V tach. S/p  donor liver transplant with biliary stent on 21, complicated by coagulopathy and finding of intra-abdominal abscess. Return to OR 21 for washout and abdominal closure.    Graft function: DDLTx 21 POD #7/6 from washout/closure. Transaminases improving, TB normalized, 0.8  - US: left hepatic artery and left portal vein not visualized due to limited sonographic windows. Repeat US post closure: normal, patent doppler.   Graft function stable  One drain discontinued today, next tomorrow  Immunosuppression management: Induction w/ steroids.  -Tacro 2.5 mg BID, titrate to a level goal 8-10. Monitor daily in setting of fluconazole,  level 4.5 (12 hour trough). Increased to 2.5 from 1.5. Diflucan was added on .  level pending.   -MMF 750mg BID   -Steroid taper per protocol, continue Prednisone 5 mg daily while Tac level is subtherapeutic.  Complexity of management: High. Contributing factors: induction.  Hematology:   ACD/ABL: Hgb stable ~8  Thrombocytopenia, acute: Plt >100  Coagulopathy: INR normalized, will no longer check daily   Cardiorespiratory:   Circulatory failure/ Hypotension: weaned off pressors .   Post-op mechanical ventilation: Extubated . RA. Continue CDB/IS  GI/Nutrition:   Post op ileus: Continue bowel regimen. BM x1 yesterday.   Severe malnutrition in the context of acute on chronic illness: Regular diet as tolerated. NJ in place, continue tube feeds.    Endocrine:   Steroid induced hyperglycemia: Hgb A1c 5.4. Insulin gtt required initially, now discontinued, ssi prn.   Fluid/Electrolytes: Hypervolemia : +3Kg, initialized diuresis with Lasix gtt 10/hr +metolazone 5 with good results, now on Lantus 20 mg daily.   GONZALO: Cr  0.7<-1.0<-1.5<-1.7. Monitor. Good UOP  Hypokalemia: K 4.3, replaced  Continue diuresis  : Jurado removed  Infectious disease: Afebrile  Leukocytosis WBC stable ~17. Repeat UA/UC and Blood cultures.   Intra abdominal abscess: Noted in OR on 11/18. Gram stain + GPC. Culture Staph epi. Completed Vanco x 7 days, will transition to Doxycycline 100 mg BID x 7 days. Continued zosyn x4 days post closure. Continue fluconazole.   Cultures NGTD  Prophylaxis: DVT(mechanical), fall, GI (PPI), Fungal ppx (Diflucan) CMV ppx (gancyclovir), PCP ppx (bactrim)  Disposition: 7A, plan for discharge home with assist     Medical Decision Making: High  Subsequent visit 04925 (high level decision making)    [place]/Fellow/Resident Provider: Jennifer Valentin PA-C [id number]    Faculty: Nasir Rojas M.D.  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    11/18/2021 (Liver), Postoperative day: 7     Interval History:  Obtain from the patient     Overnight events: NAEs.      ROS:   A 10-point review of systems was negative except as noted above.    Curent Meds:    aspirin  325 mg Oral or Feeding Tube Daily     [START ON 11/26/2021] clotrimazole  1 lozenge Buccal 4x Daily     doxycycline hyclate  100 mg Oral Q12H CAROLINE     fiber modular (NUTRISOURCE FIBER)  1 packet Per Feeding Tube TID     furosemide  20 mg Oral Daily     insulin aspart  1-7 Units Subcutaneous TID AC     insulin aspart  1-5 Units Subcutaneous At Bedtime     magnesium oxide  400 mg Oral BID     multivitamin w/minerals  1 tablet Oral Daily     mycophenolate  750 mg Oral BID IS     pantoprazole  40 mg Oral BID     phosphorus tablet 250 mg  500 mg Oral BID     polyethylene glycol  17 g Oral Daily     predniSONE  5 mg Oral Daily     protein modular  1  "packet Per Feeding Tube Daily     senna-docusate  1 tablet Oral BID    Or     senna-docusate  2 tablet Oral BID     sodium chloride (PF)  3 mL Intravenous Q8H     sulfamethoxazole-trimethoprim  1 tablet Oral Daily     tacrolimus  2.5 mg Oral BID IS     valGANciclovir  450 mg Oral Daily       Physical Exam:     Admit Weight: 57.6 kg (127 lb)    Current Vitals:   /75 (BP Location: Left arm)   Pulse 81   Temp 98  F (36.7  C) (Oral)   Resp 18   Ht 1.651 m (5' 5\")   Wt 59.9 kg (132 lb)   LMP  (LMP Unknown)   SpO2 100%   BMI 21.97 kg/m      Vital sign ranges:    Temp:  [97.4  F (36.3  C)-99  F (37.2  C)] 98  F (36.7  C)  Pulse:  [74-81] 81  Resp:  [18] 18  BP: (110-125)/(72-82) 122/75  SpO2:  [98 %-100 %] 100 %  Patient Vitals for the past 24 hrs:   BP Temp Temp src Pulse Resp SpO2 Weight   11/25/21 1053 122/75 98  F (36.7  C) Oral 81 18 100 % --   11/25/21 0718 110/72 99  F (37.2  C) Oral 81 18 98 % --   11/25/21 0356 121/73 98  F (36.7  C) Oral 79 18 100 % --   11/25/21 0100 -- -- -- -- -- -- 59.9 kg (132 lb)   11/24/21 2328 122/72 98.4  F (36.9  C) Oral 77 18 100 % --   11/24/21 1955 125/73 97.9  F (36.6  C) Oral 76 18 100 % --   11/24/21 1552 125/82 97.4  F (36.3  C) Oral 74 18 100 % --     General Appearance: NAD  Skin: warm, dry, no jaundice. Scattered ecchymosis. See abd exam  Heart: regular rate and rhythm  Lungs: NLB on RA  Abdomen: soft, no guarding, incision with sutures intact with minimal s/s drainage at trifurcation. ROMA x2, serosanguinous drainage  : Voiding.   Extremities: edema: BRIGHT +2 bilaterally  Neurologic: A0x3.    Frailty Scores     Frailty Scores 3/23/2021 3/13/2021    Final Score Not Frail Not Frail    Final Score Number 0 0          Data:   CMP  Recent Labs   Lab 11/25/21  0827 11/25/21  0648 11/24/21  0812 11/24/21  0550 11/22/21  0419 11/22/21  0418 11/21/21  0331 11/21/21  0318 11/19/21  0541 11/19/21  0533 11/18/21  1559 11/18/21  1446   NA  --  135  --  135   < >  --    < > 134 "   < > 140  --  143   POTASSIUM  --  4.0  --  4.3   < >  --    < > 2.8*   < > 3.6   < > 3.2*   CHLORIDE  --  105  --  106   < >  --    < > 105   < > 111*  --  110*   CO2  --  24  --  21   < >  --    < > 21   < > 22  --  26   GLC 97 109*   < > 117*   < >  --    < > 135*   < > 128*   < > 128*   BUN  --  36*  --  49*   < >  --    < > 39*   < > 21  --  14   CR  --  0.61  --  0.74   < >  --    < > 1.64*   < > 0.89  --  0.55   GFRESTIMATED  --  >90  --  90   < >  --    < > 34*   < > 72  --  >90   MARIELENA  --  7.8*  --  8.0*   < >  --    < > 7.5*   < > 8.6  --  9.3   ICAW  --   --   --   --   --  4.6  --  4.6   < > 4.9  --   --    MAG  --  1.5*  --  2.0   < >  --   --  1.9   < > 1.8   < > 1.7   PHOS  --  2.3*  --  1.5*   < >  --   --  4.8*   < > 3.9   < > 2.7   AMYLASE  --   --   --   --   --   --   --   --   --  36  --  31   LIPASE  --   --   --   --   --   --   --   --   --  83  --  156   ALBUMIN  --  1.7*  --  1.8*   < >  --    < > 2.0*   < > 2.5*  --  1.6*  1.6*   BILITOTAL  --  0.7  --  0.8   < >  --    < > 0.8   < > 1.3  --  3.1*  3.1*   ALKPHOS  --  69  --  58   < >  --    < > 37*   < > 36*  --  41  41   AST  --  10  --  8   < >  --    < > 22   < > 98*  --  280*  280*   ALT  --  28  --  36   < >  --    < > 79*   < > 140*  --  200*  200*    < > = values in this interval not displayed.     CBC  Recent Labs   Lab 11/25/21  0648 11/24/21  0550 11/19/21  0127 11/19/21  0126   HGB 7.9* 8.5*   < >  --    WBC 17.1* 17.7*   < >  --     151   < >  --    A1C  --   --   --  5.4    < > = values in this interval not displayed.     COAGS  Recent Labs   Lab 11/22/21  0419 11/21/21  0318   INR 1.22* 1.34*   PTT 29 31      Urinalysis  Recent Labs   Lab Test 11/24/21 2014 11/20/21  1030 11/06/21  0509 06/10/21  0632 05/21/21  0730   COLOR Light Yellow Yellow Dark Yellow*   < > Dark Yellow   APPEARANCE Clear Clear Clear   < > Clear   URINEGLC Negative Negative Negative   < > Negative   URINEBILI Negative Negative Moderate*   <  > Negative   URINEKETONE Negative Negative Negative   < > Negative   SG 1.014 1.020 1.018   < > 1.031   UBLD Negative Small* Negative   < > Negative   URINEPH 5.0 5.0 5.5   < > 6.0   PROTEIN Negative 30 * Negative   < > 20*   NITRITE Negative Negative Negative   < > Negative   LEUKEST Negative Negative Negative   < > Negative   RBCU  --  7* <1   < > 1   WBCU  --  3 1   < > 4   UTPG  --   --   --   --  0.26*    < > = values in this interval not displayed.     Virology:  Hepatitis C Antibody   Date Value Ref Range Status   11/17/2021 Nonreactive Nonreactive Final

## 2021-11-25 NOTE — PLAN OF CARE
"VS: /75 (BP Location: Left arm)   Pulse 81   Temp 98  F (36.7  C) (Oral)   Resp 18   Ht 1.651 m (5' 5\")   Wt 59.9 kg (132 lb)   LMP  (LMP Unknown)   SpO2 100%   BMI 21.97 kg/m      Cares: [id number]    Current condition: Stable on RA   Neuro: WDL  Cardio: WDL  Respiratory: WDL   GI/: Voiding spontaneously, Last BM 11/25  Skin: WDL, bruises.   Diet:   Regular diet, fair appetite.   BG: AC/HS: 97, 103  LDA: NJ with TF at goal rate. internal jugular saline locked. ROMA with serosang output.   Mobility:  Up with stand by.   Pain:incisional pain.   PRN medications: oxycodone x1.   Changes: ROMA pulled per team.   Plan of Care: Will continue to monitor and assess for any changes.   "

## 2021-11-26 ENCOUNTER — TELEPHONE (OUTPATIENT)
Dept: TRANSPLANT | Facility: CLINIC | Age: 56
End: 2021-11-26
Payer: COMMERCIAL

## 2021-11-26 ENCOUNTER — APPOINTMENT (OUTPATIENT)
Dept: PHYSICAL THERAPY | Facility: CLINIC | Age: 56
DRG: 005 | End: 2021-11-26
Payer: COMMERCIAL

## 2021-11-26 ENCOUNTER — APPOINTMENT (OUTPATIENT)
Dept: OCCUPATIONAL THERAPY | Facility: CLINIC | Age: 56
DRG: 005 | End: 2021-11-26
Payer: COMMERCIAL

## 2021-11-26 ENCOUNTER — HOME INFUSION (PRE-WILLOW HOME INFUSION) (OUTPATIENT)
Dept: PHARMACY | Facility: CLINIC | Age: 56
End: 2021-11-26
Payer: COMMERCIAL

## 2021-11-26 DIAGNOSIS — Z94.4 STATUS POST LIVER TRANSPLANT (H): Primary | ICD-10-CM

## 2021-11-26 LAB
ALBUMIN SERPL-MCNC: 1.7 G/DL (ref 3.4–5)
ALP SERPL-CCNC: 86 U/L (ref 40–150)
ALT SERPL W P-5'-P-CCNC: 27 U/L (ref 0–50)
ANION GAP SERPL CALCULATED.3IONS-SCNC: 8 MMOL/L (ref 3–14)
AST SERPL W P-5'-P-CCNC: 11 U/L (ref 0–45)
BASOPHILS # BLD AUTO: 0 10E3/UL (ref 0–0.2)
BASOPHILS NFR BLD AUTO: 0 %
BILIRUB DIRECT SERPL-MCNC: 0.5 MG/DL (ref 0–0.2)
BILIRUB SERPL-MCNC: 0.7 MG/DL (ref 0.2–1.3)
BUN SERPL-MCNC: 34 MG/DL (ref 7–30)
CALCIUM SERPL-MCNC: 8.1 MG/DL (ref 8.5–10.1)
CHLORIDE BLD-SCNC: 104 MMOL/L (ref 94–109)
CO2 SERPL-SCNC: 24 MMOL/L (ref 20–32)
CREAT SERPL-MCNC: 0.61 MG/DL (ref 0.52–1.04)
EOSINOPHIL # BLD AUTO: 0.3 10E3/UL (ref 0–0.7)
EOSINOPHIL NFR BLD AUTO: 2 %
ERYTHROCYTE [DISTWIDTH] IN BLOOD BY AUTOMATED COUNT: 17 % (ref 10–15)
GFR SERPL CREATININE-BSD FRML MDRD: >90 ML/MIN/1.73M2
GLUCOSE BLD-MCNC: 96 MG/DL (ref 70–99)
GLUCOSE BLDC GLUCOMTR-MCNC: 112 MG/DL (ref 70–99)
GLUCOSE BLDC GLUCOMTR-MCNC: 131 MG/DL (ref 70–99)
GLUCOSE BLDC GLUCOMTR-MCNC: 94 MG/DL (ref 70–99)
GLUCOSE BLDC GLUCOMTR-MCNC: 99 MG/DL (ref 70–99)
HCT VFR BLD AUTO: 24.7 % (ref 35–47)
HGB BLD-MCNC: 8 G/DL (ref 11.7–15.7)
IMM GRANULOCYTES # BLD: 0.4 10E3/UL
IMM GRANULOCYTES NFR BLD: 2 %
LYMPHOCYTES # BLD AUTO: 0.8 10E3/UL (ref 0.8–5.3)
LYMPHOCYTES NFR BLD AUTO: 5 %
MAGNESIUM SERPL-MCNC: 1.9 MG/DL (ref 1.6–2.3)
MCH RBC QN AUTO: 29.6 PG (ref 26.5–33)
MCHC RBC AUTO-ENTMCNC: 32.4 G/DL (ref 31.5–36.5)
MCV RBC AUTO: 92 FL (ref 78–100)
MONOCYTES # BLD AUTO: 0.9 10E3/UL (ref 0–1.3)
MONOCYTES NFR BLD AUTO: 5 %
NEUTROPHILS # BLD AUTO: 14.7 10E3/UL (ref 1.6–8.3)
NEUTROPHILS NFR BLD AUTO: 86 %
NRBC # BLD AUTO: 0 10E3/UL
NRBC BLD AUTO-RTO: 0 /100
PHOSPHATE SERPL-MCNC: 3.2 MG/DL (ref 2.5–4.5)
PLAT MORPH BLD: NORMAL
PLATELET # BLD AUTO: 286 10E3/UL (ref 150–450)
POTASSIUM BLD-SCNC: 4.2 MMOL/L (ref 3.4–5.3)
PROT SERPL-MCNC: 5 G/DL (ref 6.8–8.8)
RBC # BLD AUTO: 2.7 10E6/UL (ref 3.8–5.2)
RBC MORPH BLD: NORMAL
SARS-COV-2 RNA RESP QL NAA+PROBE: NEGATIVE
SODIUM SERPL-SCNC: 136 MMOL/L (ref 133–144)
TACROLIMUS BLD-MCNC: 11.9 UG/L (ref 5–15)
TME LAST DOSE: NORMAL H
TME LAST DOSE: NORMAL H
WBC # BLD AUTO: 17 10E3/UL (ref 4–11)

## 2021-11-26 PROCEDURE — 250N000013 HC RX MED GY IP 250 OP 250 PS 637: Performed by: PHYSICIAN ASSISTANT

## 2021-11-26 PROCEDURE — 80197 ASSAY OF TACROLIMUS: CPT | Performed by: PHYSICIAN ASSISTANT

## 2021-11-26 PROCEDURE — 80048 BASIC METABOLIC PNL TOTAL CA: CPT | Performed by: PHYSICIAN ASSISTANT

## 2021-11-26 PROCEDURE — U0005 INFEC AGEN DETEC AMPLI PROBE: HCPCS | Performed by: PHYSICIAN ASSISTANT

## 2021-11-26 PROCEDURE — 97116 GAIT TRAINING THERAPY: CPT | Mod: GP

## 2021-11-26 PROCEDURE — 250N000013 HC RX MED GY IP 250 OP 250 PS 637: Performed by: SURGERY

## 2021-11-26 PROCEDURE — 250N000012 HC RX MED GY IP 250 OP 636 PS 637: Performed by: PHYSICIAN ASSISTANT

## 2021-11-26 PROCEDURE — 99232 SBSQ HOSP IP/OBS MODERATE 35: CPT | Mod: 24 | Performed by: TRANSPLANT SURGERY

## 2021-11-26 PROCEDURE — 97535 SELF CARE MNGMENT TRAINING: CPT | Mod: GO | Performed by: OCCUPATIONAL THERAPIST

## 2021-11-26 PROCEDURE — 97530 THERAPEUTIC ACTIVITIES: CPT | Mod: GO | Performed by: OCCUPATIONAL THERAPIST

## 2021-11-26 PROCEDURE — 85025 COMPLETE CBC W/AUTO DIFF WBC: CPT | Performed by: PHYSICIAN ASSISTANT

## 2021-11-26 PROCEDURE — 120N000011 HC R&B TRANSPLANT UMMC

## 2021-11-26 PROCEDURE — 250N000013 HC RX MED GY IP 250 OP 250 PS 637: Performed by: TRANSPLANT SURGERY

## 2021-11-26 PROCEDURE — 83735 ASSAY OF MAGNESIUM: CPT | Performed by: PHYSICIAN ASSISTANT

## 2021-11-26 PROCEDURE — 250N000013 HC RX MED GY IP 250 OP 250 PS 637: Performed by: STUDENT IN AN ORGANIZED HEALTH CARE EDUCATION/TRAINING PROGRAM

## 2021-11-26 PROCEDURE — 36592 COLLECT BLOOD FROM PICC: CPT | Performed by: PHYSICIAN ASSISTANT

## 2021-11-26 PROCEDURE — 82248 BILIRUBIN DIRECT: CPT | Performed by: PHYSICIAN ASSISTANT

## 2021-11-26 PROCEDURE — 84100 ASSAY OF PHOSPHORUS: CPT | Performed by: PHYSICIAN ASSISTANT

## 2021-11-26 RX ORDER — TACROLIMUS 1 MG/1
2 CAPSULE ORAL
Status: DISCONTINUED | OUTPATIENT
Start: 2021-11-26 | End: 2021-11-27

## 2021-11-26 RX ADMIN — Medication 400 MG: at 11:25

## 2021-11-26 RX ADMIN — ASPIRIN 325 MG ORAL TABLET 325 MG: 325 PILL ORAL at 08:27

## 2021-11-26 RX ADMIN — CLOTRIMAZOLE 1 LOZENGE: 10 LOZENGE ORAL at 20:17

## 2021-11-26 RX ADMIN — DOXYCYCLINE HYCLATE 100 MG: 100 CAPSULE ORAL at 20:16

## 2021-11-26 RX ADMIN — Medication 1 PACKET: at 08:27

## 2021-11-26 RX ADMIN — MYCOPHENOLATE MOFETIL 750 MG: 250 CAPSULE ORAL at 17:53

## 2021-11-26 RX ADMIN — SODIUM PHOSPHATE, DIBASIC, ANHYDROUS, POTASSIUM PHOSPHATE, MONOBASIC, AND SODIUM PHOSPHATE, MONOBASIC, MONOHYDRATE 500 MG: 852; 155; 130 TABLET, COATED ORAL at 08:27

## 2021-11-26 RX ADMIN — OXYCODONE HYDROCHLORIDE 5 MG: 5 TABLET ORAL at 11:25

## 2021-11-26 RX ADMIN — Medication 1 TABLET: at 08:27

## 2021-11-26 RX ADMIN — OXYCODONE HYDROCHLORIDE 5 MG: 5 TABLET ORAL at 05:10

## 2021-11-26 RX ADMIN — PANTOPRAZOLE SODIUM 40 MG: 40 TABLET, DELAYED RELEASE ORAL at 20:17

## 2021-11-26 RX ADMIN — DOXYCYCLINE HYCLATE 100 MG: 100 CAPSULE ORAL at 08:27

## 2021-11-26 RX ADMIN — Medication 400 MG: at 17:52

## 2021-11-26 RX ADMIN — TACROLIMUS 2 MG: 1 CAPSULE ORAL at 17:52

## 2021-11-26 RX ADMIN — CLOTRIMAZOLE 1 LOZENGE: 10 LOZENGE ORAL at 16:21

## 2021-11-26 RX ADMIN — OXYCODONE HYDROCHLORIDE 5 MG: 5 TABLET ORAL at 23:33

## 2021-11-26 RX ADMIN — MYCOPHENOLATE MOFETIL 750 MG: 250 CAPSULE ORAL at 08:27

## 2021-11-26 RX ADMIN — FUROSEMIDE 20 MG: 20 TABLET ORAL at 08:27

## 2021-11-26 RX ADMIN — SODIUM PHOSPHATE, DIBASIC, ANHYDROUS, POTASSIUM PHOSPHATE, MONOBASIC, AND SODIUM PHOSPHATE, MONOBASIC, MONOHYDRATE 500 MG: 852; 155; 130 TABLET, COATED ORAL at 20:16

## 2021-11-26 RX ADMIN — CLOTRIMAZOLE 1 LOZENGE: 10 LOZENGE ORAL at 11:25

## 2021-11-26 RX ADMIN — PREDNISONE 5 MG: 5 TABLET ORAL at 08:27

## 2021-11-26 RX ADMIN — SULFAMETHOXAZOLE AND TRIMETHOPRIM 1 TABLET: 400; 80 TABLET ORAL at 08:27

## 2021-11-26 RX ADMIN — Medication 1 PACKET: at 14:03

## 2021-11-26 RX ADMIN — PANTOPRAZOLE SODIUM 40 MG: 40 TABLET, DELAYED RELEASE ORAL at 08:27

## 2021-11-26 RX ADMIN — TACROLIMUS 2.5 MG: 1 CAPSULE ORAL at 08:27

## 2021-11-26 RX ADMIN — Medication 1 PACKET: at 20:17

## 2021-11-26 RX ADMIN — OXYCODONE HYDROCHLORIDE 5 MG: 5 TABLET ORAL at 18:38

## 2021-11-26 RX ADMIN — VALGANCICLOVIR 450 MG: 450 TABLET, FILM COATED ORAL at 08:27

## 2021-11-26 RX ADMIN — CLOTRIMAZOLE 1 LOZENGE: 10 LOZENGE ORAL at 08:27

## 2021-11-26 ASSESSMENT — ACTIVITIES OF DAILY LIVING (ADL)
ADLS_ACUITY_SCORE: 10
ADLS_ACUITY_SCORE: 10
ADLS_ACUITY_SCORE: 12
ADLS_ACUITY_SCORE: 10
ADLS_ACUITY_SCORE: 12
ADLS_ACUITY_SCORE: 10
ADLS_ACUITY_SCORE: 12
ADLS_ACUITY_SCORE: 10
ADLS_ACUITY_SCORE: 12
ADLS_ACUITY_SCORE: 10
ADLS_ACUITY_SCORE: 12
ADLS_ACUITY_SCORE: 10
ADLS_ACUITY_SCORE: 12

## 2021-11-26 ASSESSMENT — MIFFLIN-ST. JEOR: SCORE: 1173.73

## 2021-11-26 NOTE — PLAN OF CARE
VSS on room air. Up ad inyd. Walked to 7C and around 7A this evening. Voiding. No BM this shift. Minimal ROMA output. Incision is CDI with no drainage. Daughter at bedside. Pt. States she feels strong and is ready to go home. Pain controlled by oxy x1. BG stable. NG with TF running at 40ml/hr. Takes pills orally with no difficulty. Reviews med card with each administration and has watched some MTP videos on her phone.

## 2021-11-26 NOTE — PROGRESS NOTES
Transplant Surgery  Inpatient Daily Progress Note  2021    Assessment & Plan: Berta Nava is a 56 year old female with PMH significant for PSC/UC, Sjogren's syndrome, HTN, liver transplant aborted in 2021 d/t V tach. S/p  donor liver transplant with biliary stent on 21, complicated by coagulopathy and finding of intra-abdominal abscess. Return to OR 21 for washout and abdominal closure.    Graft function: DDLTx 21 POD 8/7 from washout/closure. Transaminases improving, TB normalized, 0.7  - US: left hepatic artery and left portal vein not visualized due to limited sonographic windows. Repeat US post closure: normal, patent doppler.  Graft function stable. Both drains now removed  Immunosuppression management: Induction w/ steroids.  -Tacro 2.5 mg BID, titrate to a level goal 8-10. Monitor daily in setting of fluconazole, fluconazole now completed .  level pending.   -MMF 750mg BID   -Steroid taper per protocol, continue Prednisone 5 mg daily while Tac level is subtherapeutic. Stopped after tac level of 8.0 on .  Complexity of management: High. Contributing factors: induction.  Hematology:   ACD/ABL: Hgb stable ~8  Thrombocytopenia, acute: Secondary to liver disease. Now resolved.  Coagulopathy: INR normalized, will no longer check daily   Cardiorespiratory:   Circulatory failure/ Hypotension: weaned off pressors .   Post-op mechanical ventilation: Extubated . RA. Continue CDB/IS  GI/Nutrition:   Post op ileus: Continue bowel regimen.  Severe malnutrition in the context of acute on chronic illness: Regular diet as tolerated. NJ in place, continue tube feeds.    Endocrine:   Steroid induced hyperglycemia: Hgb A1c 5.4. Insulin gtt required initially, now discontinued. No insulin required.   Fluid/Electrolytes: Hypervolemia : +3Kg, initialized diuresis with Lasix gtt 10/hr +metolazone 5 with good results, now on Lasix 20 mg daily.   GONZALO: Improving, Cr  0.6. Good UOP  Hypokalemia: Replete  Continue diuresis  : Jurado removed  Infectious disease: Afebrile  Leukocytosis WBC stable ~17. Repeat UA/UC and Blood cultures- NGTD. Will remove central line today.  Intra abdominal abscess: Noted in OR on 11/18. Gram stain + GPC. Culture Staph epi. Completed Vanco x 7 days, will transition to Doxycycline 100 mg BID x 7 days. Continued zosyn x4 days post closure. Fluconazole complete 11/25.  Cultures NGTD  Prophylaxis: DVT(mechanical), fall, GI (PPI), Fungal ppx (Diflucan) CMV ppx (gancyclovir), PCP ppx (bactrim)  Disposition: 7A, plan for discharge home with assist and tube feeds. Planning for discharge this weekend.    Medical Decision Making: Medium  Subsequent visit 45269 (medium level decision making)    [place]/Fellow/Resident Provider: Shannan Umanzor PA-C [id number]    Faculty: Nasir Rojas M.D.  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    11/18/2021 (Liver), Postoperative day: 8     Interval History:  Obtain from the patient     Overnight events: NAEs. Patient motivated to try to eat more.     ROS:   A 10-point review of systems was negative except as noted above.    Curent Meds:    aspirin  325 mg Oral or Feeding Tube Daily     clotrimazole  1 lozenge Buccal 4x Daily     doxycycline hyclate  100 mg Oral Q12H CAROLINE     fiber modular (NUTRISOURCE FIBER)  1 packet Per Feeding Tube TID     furosemide  20 mg Oral Daily     insulin aspart  1-7 Units Subcutaneous TID AC     insulin aspart  1-5 Units Subcutaneous At Bedtime     magnesium oxide  400 mg Oral BID     multivitamin w/minerals  1 tablet Oral Daily     mycophenolate  750 mg Oral BID IS     pantoprazole  40 mg Oral BID     phosphorus tablet 250 mg  500 mg Oral BID     polyethylene  "glycol  17 g Oral Daily     predniSONE  5 mg Oral Daily     protein modular  1 packet Per Feeding Tube Daily     senna-docusate  1 tablet Oral BID    Or     senna-docusate  2 tablet Oral BID     sodium chloride (PF)  3 mL Intravenous Q8H     sulfamethoxazole-trimethoprim  1 tablet Oral Daily     tacrolimus  2.5 mg Oral BID IS     valGANciclovir  450 mg Oral Daily       Physical Exam:     Admit Weight: 57.6 kg (127 lb)    Current Vitals:   /75 (BP Location: Left arm)   Pulse 83   Temp 98.1  F (36.7  C) (Oral)   Resp 18   Ht 1.651 m (5' 5\")   Wt 59.3 kg (130 lb 11.2 oz)   LMP  (LMP Unknown)   SpO2 100%   BMI 21.75 kg/m      Vital sign ranges:    Temp:  [98  F (36.7  C)-99  F (37.2  C)] 98.1  F (36.7  C)  Pulse:  [80-84] 83  Resp:  [18-20] 18  BP: (107-128)/(68-80) 114/75  SpO2:  [100 %] 100 %  Patient Vitals for the past 24 hrs:   BP Temp Temp src Pulse Resp SpO2 Weight   11/26/21 1140 114/75 98.1  F (36.7  C) Oral 83 18 100 % --   11/26/21 0700 127/80 98.1  F (36.7  C) Oral 81 18 100 % --   11/26/21 0300 128/74 98  F (36.7  C) Oral 80 18 100 % --   11/26/21 0100 -- -- -- -- -- -- 59.3 kg (130 lb 11.2 oz)   11/25/21 2349 128/74 99  F (37.2  C) Oral 82 20 100 % --   11/25/21 1903 117/72 98.7  F (37.1  C) Oral 81 18 100 % --   11/25/21 1506 107/68 98.8  F (37.1  C) Oral 84 18 100 % --     General Appearance: NAD  Skin: warm, dry, no jaundice. Scattered ecchymosis. See abd exam  Heart: regular rate and rhythm  Lungs: NLB on RA  Abdomen: soft, no guarding, incision with sutures intact with minimal s/s drainage at trifurcation. JPs both now removed  : Voiding.   Extremities: edema: BRIGHT +2 bilaterally  Neurologic: A0x3.    Frailty Scores     Frailty Scores 3/23/2021 3/13/2021    Final Score Not Frail Not Frail    Final Score Number 0 0          Data:   CMP  Recent Labs   Lab 11/26/21  1127 11/26/21  0732 11/26/21  0649 11/25/21  0827 11/25/21  0648 11/22/21  0419 11/22/21  0418 11/21/21  0331 11/21/21  0318 "   NA  --   --  136  --  135   < >  --    < > 134   POTASSIUM  --   --  4.2  --  4.0   < >  --    < > 2.8*   CHLORIDE  --   --  104  --  105   < >  --    < > 105   CO2  --   --  24  --  24   < >  --    < > 21   GLC 99 94 96   < > 109*   < >  --    < > 135*   BUN  --   --  34*  --  36*   < >  --    < > 39*   CR  --   --  0.61  --  0.61   < >  --    < > 1.64*   GFRESTIMATED  --   --  >90  --  >90   < >  --    < > 34*   MARIELENA  --   --  8.1*  --  7.8*   < >  --    < > 7.5*   ICAW  --   --   --   --   --   --  4.6  --  4.6   MAG  --   --  1.9  --  1.5*   < >  --   --  1.9   PHOS  --   --  3.2  --  2.3*   < >  --   --  4.8*   ALBUMIN  --   --  1.7*  --  1.7*   < >  --    < > 2.0*   BILITOTAL  --   --  0.7  --  0.7   < >  --    < > 0.8   ALKPHOS  --   --  86  --  69   < >  --    < > 37*   AST  --   --  11  --  10   < >  --    < > 22   ALT  --   --  27  --  28   < >  --    < > 79*    < > = values in this interval not displayed.     CBC  Recent Labs   Lab 11/26/21  0649 11/25/21  0648   HGB 8.0* 7.9*   WBC 17.0* 17.1*    191     COAGS  Recent Labs   Lab 11/22/21  0419 11/21/21  0318   INR 1.22* 1.34*   PTT 29 31      Urinalysis  Recent Labs   Lab Test 11/24/21 2014 11/20/21  1030 11/06/21  0509 06/10/21  0632 05/21/21  0730   COLOR Light Yellow Yellow Dark Yellow*   < > Dark Yellow   APPEARANCE Clear Clear Clear   < > Clear   URINEGLC Negative Negative Negative   < > Negative   URINEBILI Negative Negative Moderate*   < > Negative   URINEKETONE Negative Negative Negative   < > Negative   SG 1.014 1.020 1.018   < > 1.031   UBLD Negative Small* Negative   < > Negative   URINEPH 5.0 5.0 5.5   < > 6.0   PROTEIN Negative 30 * Negative   < > 20*   NITRITE Negative Negative Negative   < > Negative   LEUKEST Negative Negative Negative   < > Negative   RBCU  --  7* <1   < > 1   WBCU  --  3 1   < > 4   UTPG  --   --   --   --  0.26*    < > = values in this interval not displayed.     Virology:  Hepatitis C Antibody   Date Value  Ref Range Status   11/17/2021 Nonreactive Nonreactive Final

## 2021-11-26 NOTE — PLAN OF CARE
"/71 (BP Location: Left arm)   Pulse 83   Temp 98.8  F (37.1  C) (Oral)   Resp 18   Ht 1.651 m (5' 5\")   Wt 59.3 kg (130 lb 11.2 oz)   LMP  (LMP Unknown)   SpO2 100%   BMI 21.75 kg/m      Shift: 7a-730p  VS: Stable. RA, afebrile  Neuro: AOx4  B,99  Labs: Unremarkable  Respiratory: WNL, clear througout  Pain/Nausea/PRN: 2/10, Oxycodone given x 1  Diet: Regular, TF @40ml/hr, poor PO intake  LDA: internal jugular d/c'd, PIV SL, ROMA  GI/: voiding not saving,   Skin: ecchymotic, fragile, skin tear to right neck and left flank  Mobility: Up w/ SBA  Plan: Possible discharge, continue to Monitor    Will give report to oncoming nurse. Pt left in stable condition, care relinquished at this time.    "

## 2021-11-26 NOTE — TELEPHONE ENCOUNTER
Call back to Nereyda.  She was asking which day Berta is going home.  Told her I'm not sure, asked her to call the inpatient unit 7A

## 2021-11-26 NOTE — PROGRESS NOTES
CLINICAL NUTRITION SERVICES - DISCHARGE NOTE  (In preparation for discharge over the weekend)    Patient s discharge needs assessed and discharge planning has been conducted with the multidisciplinary transplant care team including physicians, pharmacy, social work and transplant coordinator.    Follow up/Monitoring:  Once discharged, place outpatient nutrition consult via the transplant team if nutrition concerns arise.    Adelita De Luna MS, RD, LD, CCTD  7A/Obs units, pager [phone]

## 2021-11-26 NOTE — PROGRESS NOTES
Calorie Count  Intake recorded for: 11/25  Total Kcals: 0 Total Protein: 0g  Kcals from Hospital Food: 0   Protein: 0g  Kcals from Outside Food (average):0 Protein: 0g  # Meals Recorded: 2 meals ordered, no food intake recorded  # Supplements Recorded: 0

## 2021-11-26 NOTE — TELEPHONE ENCOUNTER
Patient Call: General  Route to LPN    Reason for call: Keli called to get clarification on the expectation for admission & how long patient should be in ATC    Call back needed? Yes    Return Call Needed  Same as documented in contacts section

## 2021-11-26 NOTE — TELEPHONE ENCOUNTER
Call to 7A to ask about discharge plans for Berta.  Spoke to her nurse, she told me it is possible that Mechelle will leave this weekend.  Orders for transplant follow-up placed.

## 2021-11-26 NOTE — PROGRESS NOTES
[facility]     Received referral from Brissa Ruelas RNCC for Enteral feedings.  Benefits verified.  Pt has a UMR plan, ded and oop are met for 2021.   She is covered 100% for enteral feeds at home with the exception of Formula which is not covered in this case since the plan requires sole source of nutrition.    Self-pay cost for formula is: TwoCal 5 cans daily is $8.30, Prosource is $1.38 per package.  Spoke with patient to review home infusion services, review benefits and offer choice of providers.  Patient would like to use I for home infusion if needed and is willing to self-pay for the cost of formula.  Berta is expected to discharge as soon as this weekend and may be going home on enteral feeds via pump.  She has done home enteral therapy before as recently as September will not need initial teaching.  Met with patient at bedside and reviewed information about [facility] services.  Patient reports she is familiar with the Inf[employer] pump and was able to independently administer feeds when needed previously.  Berta also states she is hopeful to not need the enteral feeds at discharge but is willing to have them if necessary.  Informed patient about supplies and delivery of supplies, storage of formula, plan for SNV and 24/7 availability of I staff while on enteral therapy.  Patient verbalized understanding of all information given.  She is willing and able to learn and manage home enteral therapy.    Questions answered.  Plan for [facility] home care to provide home nursing visits as arranged by RNCC.  [facility] Liaison will follow along to assist with discharge home with infusion needs.      Thank you for the referral.     ADRIANA Velez  [facility] Nurse Liaison   [email or web address]  Cell: [phone] M-F  [facility] Main: [phone]

## 2021-11-26 NOTE — PLAN OF CARE
"/74 (BP Location: Left arm)   Pulse 80   Temp 98  F (36.7  C) (Oral)   Resp 18   Ht 1.651 m (5' 5\")   Wt 59.3 kg (130 lb 11.2 oz)   LMP  (LMP Unknown)   SpO2 100%   BMI 21.75 kg/m      Shift: 0421-8031  Isolation Status: Contact - VRE  VS: AVSS on RA  Neuro: A&O x4.  Behaviors: Calm, cooperative and pleasant  B-110. No inulin needed.  Respiratory: Diminished lower lobes sounds and a wet infrequent cough.  Cardiac: WDL.  Pain/Nausea/PRN: Managed with 5 mg of Oxy x2  Diet: Regular diet, poor apettite.  LDA: NJ, PIV, IL.  Infusion(s): TF @ 40  GI/: voiding spontaneously and without difficulty and also stooled overnight.  Skin: WDL ex generalized bruising  Mobility: Up with SBA  Plan: Potential discharge on Friday    "

## 2021-11-26 NOTE — PROGRESS NOTES
Care Management Follow Up    Length of Stay (days): 9    Expected Discharge Date: 11/27/2021     Concerns to be Addressed: care coordination/care conferences,discharge planning     Patient plan of care discussed at interdisciplinary rounds: Yes    Anticipated Discharge Disposition: Home Care,Home Infusion,Home     Anticipated Discharge Services: None  Anticipated Discharge DME: None    Patient/family educated on Medicare website which has current facility and service quality ratings: yes  Education Provided on the Discharge Plan:  Yes  Patient/Family in Agreement with the Plan: yes    Referrals Placed by CM/SW: Homecare,Home Infusion,External Care Coordination    Additional Information:  Pt status reviewed/discussed during care team rounds.  Team anticipates possible discharge over the weekend. [facility] has accepted pt for home RN services.  Team anticipates pt will need enteral feeds when cleared for discharge.    [facility] Benefit check: Patient Berta Nava has coverage for enteral tf with her UMR plan, she is covered 100% since her ded and oop are met for 2021. Formula is not covered in this case since the plan requires sole source of nutrition.  TwoCal 5 cans daily is $8.30   Prosource is $1.38 per package    Updated [employer] [employer].   Discharge home care/home infusion orders updated.     Met with pt.  Reviewed anticipated plan for discharge.  Pt hopeful that she will not need enteral feeds when cleared for discharge, however if needed per pt she has done enteral feeds in the past. Pt confirmed she has spoken with [employer] Liaison regarding home enteral coverage.  Discussed/reviewed home care RN.  Pt notes no concerns or questions at this time.      Discharge home care orders reviewed.  Outpatient transplant RNCC aware of pending discharge.      Brissa Ruelas RN BSN, PHN, ACM-RN  7A RN Care Coordinator  Phone: [phone]  Pager [phone]  To contact the weekend RNCC, Page: [phone]    11/26/2021 12:46  PM

## 2021-11-27 ENCOUNTER — HOME INFUSION (PRE-WILLOW HOME INFUSION) (OUTPATIENT)
Dept: PHARMACY | Facility: CLINIC | Age: 56
End: 2021-11-27
Payer: COMMERCIAL

## 2021-11-27 ENCOUNTER — APPOINTMENT (OUTPATIENT)
Dept: PHYSICAL THERAPY | Facility: CLINIC | Age: 56
DRG: 005 | End: 2021-11-27
Payer: COMMERCIAL

## 2021-11-27 VITALS
HEART RATE: 86 BPM | SYSTOLIC BLOOD PRESSURE: 100 MMHG | WEIGHT: 129.5 LBS | RESPIRATION RATE: 16 BRPM | OXYGEN SATURATION: 98 % | DIASTOLIC BLOOD PRESSURE: 67 MMHG | TEMPERATURE: 98.1 F | BODY MASS INDEX: 21.58 KG/M2 | HEIGHT: 65 IN

## 2021-11-27 LAB
ALBUMIN SERPL-MCNC: 1.7 G/DL (ref 3.4–5)
ALP SERPL-CCNC: 73 U/L (ref 40–150)
ALT SERPL W P-5'-P-CCNC: 24 U/L (ref 0–50)
ANION GAP SERPL CALCULATED.3IONS-SCNC: 5 MMOL/L (ref 3–14)
AST SERPL W P-5'-P-CCNC: 9 U/L (ref 0–45)
BASOPHILS # BLD AUTO: 0 10E3/UL (ref 0–0.2)
BASOPHILS NFR BLD AUTO: 0 %
BILIRUB DIRECT SERPL-MCNC: 0.5 MG/DL (ref 0–0.2)
BILIRUB SERPL-MCNC: 0.6 MG/DL (ref 0.2–1.3)
BUN SERPL-MCNC: 42 MG/DL (ref 7–30)
CALCIUM SERPL-MCNC: 8.1 MG/DL (ref 8.5–10.1)
CHLORIDE BLD-SCNC: 105 MMOL/L (ref 94–109)
CO2 SERPL-SCNC: 24 MMOL/L (ref 20–32)
CREAT SERPL-MCNC: 0.79 MG/DL (ref 0.52–1.04)
EOSINOPHIL # BLD AUTO: 0.2 10E3/UL (ref 0–0.7)
EOSINOPHIL NFR BLD AUTO: 1 %
ERYTHROCYTE [DISTWIDTH] IN BLOOD BY AUTOMATED COUNT: 16.9 % (ref 10–15)
GFR SERPL CREATININE-BSD FRML MDRD: 83 ML/MIN/1.73M2
GLUCOSE BLD-MCNC: 107 MG/DL (ref 70–99)
GLUCOSE BLDC GLUCOMTR-MCNC: 102 MG/DL (ref 70–99)
GLUCOSE BLDC GLUCOMTR-MCNC: 109 MG/DL (ref 70–99)
HCT VFR BLD AUTO: 23 % (ref 35–47)
HGB BLD-MCNC: 7.3 G/DL (ref 11.7–15.7)
IMM GRANULOCYTES # BLD: 0.3 10E3/UL
IMM GRANULOCYTES NFR BLD: 2 %
LYMPHOCYTES # BLD AUTO: 0.8 10E3/UL (ref 0.8–5.3)
LYMPHOCYTES NFR BLD AUTO: 6 %
MAGNESIUM SERPL-MCNC: 1.8 MG/DL (ref 1.6–2.3)
MCH RBC QN AUTO: 29.2 PG (ref 26.5–33)
MCHC RBC AUTO-ENTMCNC: 31.7 G/DL (ref 31.5–36.5)
MCV RBC AUTO: 92 FL (ref 78–100)
MONOCYTES # BLD AUTO: 0.8 10E3/UL (ref 0–1.3)
MONOCYTES NFR BLD AUTO: 6 %
NEUTROPHILS # BLD AUTO: 11.5 10E3/UL (ref 1.6–8.3)
NEUTROPHILS NFR BLD AUTO: 85 %
NRBC # BLD AUTO: 0 10E3/UL
NRBC BLD AUTO-RTO: 0 /100
PHOSPHATE SERPL-MCNC: 3.4 MG/DL (ref 2.5–4.5)
PLATELET # BLD AUTO: 301 10E3/UL (ref 150–450)
POTASSIUM BLD-SCNC: 4.3 MMOL/L (ref 3.4–5.3)
PROT SERPL-MCNC: 4.9 G/DL (ref 6.8–8.8)
RBC # BLD AUTO: 2.5 10E6/UL (ref 3.8–5.2)
SODIUM SERPL-SCNC: 134 MMOL/L (ref 133–144)
TACROLIMUS BLD-MCNC: 19.9 UG/L (ref 5–15)
TME LAST DOSE: ABNORMAL H
TME LAST DOSE: ABNORMAL H
WBC # BLD AUTO: 13.6 10E3/UL (ref 4–11)

## 2021-11-27 PROCEDURE — 80197 ASSAY OF TACROLIMUS: CPT | Performed by: PHYSICIAN ASSISTANT

## 2021-11-27 PROCEDURE — 250N000013 HC RX MED GY IP 250 OP 250 PS 637: Performed by: PHYSICIAN ASSISTANT

## 2021-11-27 PROCEDURE — 250N000012 HC RX MED GY IP 250 OP 636 PS 637: Performed by: PHYSICIAN ASSISTANT

## 2021-11-27 PROCEDURE — 83735 ASSAY OF MAGNESIUM: CPT | Performed by: PHYSICIAN ASSISTANT

## 2021-11-27 PROCEDURE — 250N000013 HC RX MED GY IP 250 OP 250 PS 637: Performed by: STUDENT IN AN ORGANIZED HEALTH CARE EDUCATION/TRAINING PROGRAM

## 2021-11-27 PROCEDURE — 85025 COMPLETE CBC W/AUTO DIFF WBC: CPT | Performed by: PHYSICIAN ASSISTANT

## 2021-11-27 PROCEDURE — 250N000013 HC RX MED GY IP 250 OP 250 PS 637: Performed by: TRANSPLANT SURGERY

## 2021-11-27 PROCEDURE — 36415 COLL VENOUS BLD VENIPUNCTURE: CPT | Performed by: PHYSICIAN ASSISTANT

## 2021-11-27 PROCEDURE — 97750 PHYSICAL PERFORMANCE TEST: CPT | Mod: GP

## 2021-11-27 PROCEDURE — 250N000013 HC RX MED GY IP 250 OP 250 PS 637: Performed by: SURGERY

## 2021-11-27 PROCEDURE — 82248 BILIRUBIN DIRECT: CPT | Performed by: PHYSICIAN ASSISTANT

## 2021-11-27 PROCEDURE — 80048 BASIC METABOLIC PNL TOTAL CA: CPT | Performed by: PHYSICIAN ASSISTANT

## 2021-11-27 PROCEDURE — 84100 ASSAY OF PHOSPHORUS: CPT | Performed by: PHYSICIAN ASSISTANT

## 2021-11-27 PROCEDURE — 97116 GAIT TRAINING THERAPY: CPT | Mod: GP

## 2021-11-27 RX ORDER — TACROLIMUS 1 MG/1
1 CAPSULE ORAL
Status: DISCONTINUED | OUTPATIENT
Start: 2021-11-27 | End: 2021-11-27 | Stop reason: HOSPADM

## 2021-11-27 RX ORDER — GUAR GUM
1 PACKET (EA) ORAL 3 TIMES DAILY
Qty: 75 PACKET | Refills: 0
Start: 2021-11-27 | End: 2021-12-23

## 2021-11-27 RX ORDER — POLYETHYLENE GLYCOL 3350 17 G/17G
17 POWDER, FOR SOLUTION ORAL DAILY
Qty: 510 G | Refills: 0 | Status: SHIPPED | OUTPATIENT
Start: 2021-11-27 | End: 2021-12-07

## 2021-11-27 RX ORDER — ASPIRIN 325 MG
325 TABLET ORAL DAILY
Qty: 30 TABLET | Refills: 11 | Status: SHIPPED | OUTPATIENT
Start: 2021-11-28 | End: 2021-11-29

## 2021-11-27 RX ORDER — SULFAMETHOXAZOLE AND TRIMETHOPRIM 400; 80 MG/1; MG/1
1 TABLET ORAL DAILY
Qty: 30 TABLET | Refills: 5 | Status: SHIPPED | OUTPATIENT
Start: 2021-11-28 | End: 2021-11-29

## 2021-11-27 RX ORDER — PANTOPRAZOLE SODIUM 40 MG/1
40 TABLET, DELAYED RELEASE ORAL 2 TIMES DAILY
Qty: 60 TABLET | Refills: 11 | Status: SHIPPED | OUTPATIENT
Start: 2021-11-27 | End: 2021-11-29

## 2021-11-27 RX ORDER — VALGANCICLOVIR 450 MG/1
450 TABLET, FILM COATED ORAL DAILY
Qty: 30 TABLET | Refills: 5 | Status: SHIPPED | OUTPATIENT
Start: 2021-11-28 | End: 2021-11-29

## 2021-11-27 RX ORDER — OXYCODONE HYDROCHLORIDE 5 MG/1
5 TABLET ORAL EVERY 4 HOURS PRN
Qty: 15 TABLET | Refills: 0 | Status: SHIPPED | OUTPATIENT
Start: 2021-11-27 | End: 2021-12-10

## 2021-11-27 RX ORDER — TACROLIMUS 0.5 MG/1
CAPSULE ORAL
Qty: 60 CAPSULE | Refills: 1 | Status: SHIPPED | OUTPATIENT
Start: 2021-11-27 | End: 2021-12-23

## 2021-11-27 RX ORDER — MYCOPHENOLATE MOFETIL 250 MG/1
750 CAPSULE ORAL 2 TIMES DAILY
Qty: 180 CAPSULE | Refills: 11 | Status: SHIPPED | OUTPATIENT
Start: 2021-11-27 | End: 2021-11-29

## 2021-11-27 RX ORDER — TACROLIMUS 1 MG/1
2 CAPSULE ORAL
Status: DISCONTINUED | OUTPATIENT
Start: 2021-11-27 | End: 2021-11-27

## 2021-11-27 RX ORDER — FUROSEMIDE 20 MG
20 TABLET ORAL DAILY
Qty: 30 TABLET | Refills: 3 | Status: SHIPPED | OUTPATIENT
Start: 2021-11-28 | End: 2021-11-29

## 2021-11-27 RX ORDER — DOXYCYCLINE 100 MG/1
100 CAPSULE ORAL EVERY 12 HOURS
Qty: 10 CAPSULE | Refills: 0 | Status: SHIPPED | OUTPATIENT
Start: 2021-11-27 | End: 2021-12-02

## 2021-11-27 RX ORDER — MAGNESIUM OXIDE 400 MG/1
400 TABLET ORAL 2 TIMES DAILY
Qty: 60 TABLET | Refills: 11 | Status: SHIPPED | OUTPATIENT
Start: 2021-11-27 | End: 2021-11-29

## 2021-11-27 RX ORDER — AMOXICILLIN 250 MG
1 CAPSULE ORAL 2 TIMES DAILY
Qty: 60 TABLET | Refills: 0 | Status: SHIPPED | OUTPATIENT
Start: 2021-11-27 | End: 2021-12-07

## 2021-11-27 RX ORDER — TACROLIMUS 1 MG/1
1 CAPSULE ORAL 2 TIMES DAILY
Qty: 60 CAPSULE | Refills: 11 | Status: SHIPPED | OUTPATIENT
Start: 2021-11-27 | End: 2021-11-29

## 2021-11-27 RX ORDER — AMINO AC/PROTEIN HYDR/WHEY PRO 10G-100/30
1 LIQUID (ML) ORAL DAILY
Qty: 30 PACKET | Refills: 0 | Status: SHIPPED | OUTPATIENT
Start: 2021-11-28 | End: 2021-12-23

## 2021-11-27 RX ORDER — URSODIOL 300 MG/1
300 CAPSULE ORAL 2 TIMES DAILY
Qty: 60 CAPSULE | Refills: 11 | Status: SHIPPED | OUTPATIENT
Start: 2021-11-27 | End: 2021-11-29

## 2021-11-27 RX ORDER — VALGANCICLOVIR 450 MG/1
450 TABLET, FILM COATED ORAL DAILY
Status: DISCONTINUED | OUTPATIENT
Start: 2021-11-28 | End: 2021-11-27 | Stop reason: HOSPADM

## 2021-11-27 RX ADMIN — CLOTRIMAZOLE 1 LOZENGE: 10 LOZENGE ORAL at 12:43

## 2021-11-27 RX ADMIN — PANTOPRAZOLE SODIUM 40 MG: 40 TABLET, DELAYED RELEASE ORAL at 08:11

## 2021-11-27 RX ADMIN — Medication 400 MG: at 12:43

## 2021-11-27 RX ADMIN — OXYCODONE HYDROCHLORIDE 5 MG: 5 TABLET ORAL at 12:43

## 2021-11-27 RX ADMIN — TACROLIMUS 2 MG: 1 CAPSULE ORAL at 08:11

## 2021-11-27 RX ADMIN — Medication 1 PACKET: at 08:11

## 2021-11-27 RX ADMIN — SULFAMETHOXAZOLE AND TRIMETHOPRIM 1 TABLET: 400; 80 TABLET ORAL at 08:11

## 2021-11-27 RX ADMIN — DOXYCYCLINE HYCLATE 100 MG: 100 CAPSULE ORAL at 08:11

## 2021-11-27 RX ADMIN — VALGANCICLOVIR 450 MG: 450 TABLET, FILM COATED ORAL at 08:11

## 2021-11-27 RX ADMIN — MYCOPHENOLATE MOFETIL 750 MG: 250 CAPSULE ORAL at 08:11

## 2021-11-27 RX ADMIN — CLOTRIMAZOLE 1 LOZENGE: 10 LOZENGE ORAL at 08:10

## 2021-11-27 RX ADMIN — Medication 1 TABLET: at 08:10

## 2021-11-27 RX ADMIN — ASPIRIN 325 MG ORAL TABLET 325 MG: 325 PILL ORAL at 08:10

## 2021-11-27 RX ADMIN — SODIUM PHOSPHATE, DIBASIC, ANHYDROUS, POTASSIUM PHOSPHATE, MONOBASIC, AND SODIUM PHOSPHATE, MONOBASIC, MONOHYDRATE 500 MG: 852; 155; 130 TABLET, COATED ORAL at 08:11

## 2021-11-27 RX ADMIN — OXYCODONE HYDROCHLORIDE 5 MG: 5 TABLET ORAL at 06:36

## 2021-11-27 RX ADMIN — FUROSEMIDE 20 MG: 20 TABLET ORAL at 08:10

## 2021-11-27 ASSESSMENT — ACTIVITIES OF DAILY LIVING (ADL)
ADLS_ACUITY_SCORE: 10

## 2021-11-27 ASSESSMENT — MIFFLIN-ST. JEOR: SCORE: 1168.29

## 2021-11-27 NOTE — PLAN OF CARE
"/72 (BP Location: Left arm)   Pulse 86   Temp 98.2  F (36.8  C) (Oral)   Resp 20   Ht 1.651 m (5' 5\")   Wt 58.7 kg (129 lb 8 oz)   LMP  (LMP Unknown)   SpO2 98%   BMI 21.55 kg/m     Shift: 4910-5055  VS: VSS on RA, afebrile.   Neuro: AOx4  BG: achs  Cardiopulmonary: WDL  Pain/Nausea/PRN: Endorse incisional pain, received oxycodone x1.   Diet: Reg poor appetite; TF at 40mL/hr.  LDA: L PIV; ND tube.  GI/: Voiding w/o difficulty, LBM 11/26  Skin: Moderate LE edema  Mobility: A1 w/ walker & GB  Plan/Education: Lab book & med card up to date; encourage PO intake & ambulation.     Will continue with POC, will notify MD with changes or concerns.     "

## 2021-11-27 NOTE — PROGRESS NOTES
Care Management Discharge Note    Discharge Date: 11/27/2021       Discharge Disposition: Home Care,Home Infusion,Home    Discharge Services: [facility]HI: home TF    Discharge DME: for home TF    Discharge Transportation: family or friend will provide    Private pay costs discussed: Not applicable    PAS Confirmation Code:    Patient/family educated on Medicare website which has current facility and service quality ratings: yes    Education Provided on the Discharge Plan:    Persons Notified of Discharge Plans: patient  Patient/Family in Agreement with the Plan: yes    Handoff Referral Completed: Yes    Additional Information:  Patient discharging today, needs HI for home TF, orders/referral to I, orders entered and FHI updated on plan of discharge.     [facility]  Ph: [phone]  Fax: [phone]    Alla Castano [facility]  Pager: [phone]  Weekend Pager: [phone]

## 2021-11-27 NOTE — PHARMACY-TRANSPLANT NOTE
Solid Organ Transplant Recipient Prior to Discharge Note    56 year old female s/p Orthotopic Liver Transplant on 11/18 for PSC.    Maintenance Immunosuppression  - Mycophenolate mofetil 750mg bid  - Tacrolimus goal 8-12ng/mL    Infection prophylaxis  - PJP: Bactrim single strength daily indefinitely  - CMV D+/R-: valganciclovir for 6 months duration (high risk)    Intra-abdominal abscess - Methicillin resistant Staph epi  - Vancomycin 11/18-11/25  - Doxycycline 100mg bid 11/25-12/02 stop date entered    Hepatic artery thrombosis prophylaxis  - Aspirin 325mg daily indefinitely    Acid reflux/GERD  - Pantoprazole 40mg BID. Attempt to wean off after 12/18    Pharmacy has monitored for medication interactions and immunosuppression levels in conjunction with the multidisciplinary team. In anticipation for discharge, medication therapy needs have been addressed daily throughout the current admission via multidisciplinary rounds and/or discussions, order verification, daily clinical pharmacy review, and communication with prescribers.  Jimi Dukes PharmD, BCPS  Inpatient clinical pharmacist

## 2021-11-27 NOTE — PLAN OF CARE
"/67 (BP Location: Left arm)   Pulse 86   Temp 98.1  F (36.7  C) (Oral)   Resp 16   Ht 1.651 m (5' 5\")   Wt 58.7 kg (129 lb 8 oz)   LMP  (LMP Unknown)   SpO2 98%   BMI 21.55 kg/m      DISCHARGE:  Patient with orders to discharge to home.     Education Provided:   Med Card up to date.  Lab Book up to date.    Discharge instructions, medications & follow ups reviewed with patient and her daughter. Copy of discharge summary given to patient. PIV removed.    Patient in stable condition. AVSS. Patient had no further questions regarding discharge instructions and medications. Patient transferred out by transport & left with belongings.  Plan for outpatient follow up.      "

## 2021-11-27 NOTE — PROGRESS NOTES
11/27/21 0800   Signing Clinician's Name / Credentials   Signing clinician's name / credentials Deneen Holly, PT, DPT   Dynamic Gait Index (Osmin and Drake [place], 1995)   Gait Level Surface 2  (increased lateral sway)   Change in Gait Speed 3   Gait and Horizontal Head Turns 2  (decreased velocity)   Gait with Vertical Head Turns 2  (decreased step length)   Gait and Pivot Turns 2   Step Over Obstacle 2   Step Around Obstacles 2   Steps 2   Total Dynamic Gait Index Score  (A score of 19 or less has been correlated to an increased risk of falls in community dwelling older adults, patients with vestibular disorders, and patients with MS.)   Total Score (out of 24) 17   Dynamic Gait Index (DGI):The DGI is a measure of balance during gait that is reliable and valid for the elderly and individuals with Parkinson's disease, MS, vestibular disorders, or s/p stroke. Gait assistive device used: None    Patient score: 17/24  Scores ?19/24 indicate an increased risk for falls according to Sally et al 2000  Minimal Detectable Change = 2.9 in community dwelling elderly according to Art et al 2011    Assessment (rationale for performing, application to patient s function & care plan): Patient displays increased risk of falls and would benefit from  PT to progress balance and strength. Patient is agreeable to plan.

## 2021-11-28 ENCOUNTER — MEDICAL CORRESPONDENCE (OUTPATIENT)
Dept: HEALTH INFORMATION MANAGEMENT | Facility: CLINIC | Age: 56
End: 2021-11-28
Payer: COMMERCIAL

## 2021-11-28 ENCOUNTER — HOME INFUSION (PRE-WILLOW HOME INFUSION) (OUTPATIENT)
Dept: PHARMACY | Facility: CLINIC | Age: 56
End: 2021-11-28
Payer: COMMERCIAL

## 2021-11-29 ENCOUNTER — HOME INFUSION (PRE-WILLOW HOME INFUSION) (OUTPATIENT)
Dept: PHARMACY | Facility: CLINIC | Age: 56
End: 2021-11-29
Payer: COMMERCIAL

## 2021-11-29 ENCOUNTER — TELEPHONE (OUTPATIENT)
Dept: TRANSPLANT | Facility: CLINIC | Age: 56
End: 2021-11-29
Payer: COMMERCIAL

## 2021-11-29 DIAGNOSIS — K83.01 PSC (PRIMARY SCLEROSING CHOLANGITIS) (H): ICD-10-CM

## 2021-11-29 DIAGNOSIS — R18.8 CIRRHOSIS OF LIVER WITH ASCITES, UNSPECIFIED HEPATIC CIRRHOSIS TYPE (H): ICD-10-CM

## 2021-11-29 DIAGNOSIS — Z94.4 STATUS POST LIVER TRANSPLANTATION (H): ICD-10-CM

## 2021-11-29 DIAGNOSIS — K74.60 CIRRHOSIS OF LIVER WITH ASCITES, UNSPECIFIED HEPATIC CIRRHOSIS TYPE (H): ICD-10-CM

## 2021-11-29 PROBLEM — D84.9 IMMUNOSUPPRESSED STATUS (H): Status: ACTIVE | Noted: 2021-11-29

## 2021-11-29 LAB
BACTERIA BLD CULT: NO GROWTH
BACTERIA BLD CULT: NO GROWTH

## 2021-11-29 NOTE — TELEPHONE ENCOUNTER
Spoke to Shauna and informed her that pt will be reducing tube feeding to 70ML/hour over a 12 hour period.     Also instructed pt and daughter of the change.

## 2021-11-29 NOTE — PLAN OF CARE
Occupational Therapy Discharge Summary    Reason for therapy discharge:    Discharged to home.    Progress towards therapy goal(s). See goals on Care Plan in [facility] electronic health record for goal details.  Goals partially met.  Barriers to achieving goals:   discharge from facility.    Therapy recommendation(s):    Continue home exercise program.

## 2021-11-29 NOTE — PROGRESS NOTES
This is a recent snapshot of the patient's [facility] medical record.  For current drug dose and complete information and questions, call [phone] or In Basket pool, fv home infusion ([id number])  CSN Number:  [id number]

## 2021-11-29 NOTE — TELEPHONE ENCOUNTER
Berta is a liver transplant patient who discharged on 11/27/2021.     The patient will be responsible for managing medications. Patient's daughter (Karin) was in on the education and can assist if needed.    Patient will use local pharmacy or other mail order for outpatient medications. - Js Mclean    **[facility] CAN DELIVER TO PATIENT IF THEY ARE PHYSICALLY UNABLE TO  MEDICATIONS. IF THEY ARE OK DOCUMENTING THIS THEN WE CAN DELIVER. OTHERWISE, PATIENT MAY  AT [facility], FILL AT THEIR LOCAL PHARMACY, USE [facility] MAIL ORDER, OR [facility] FOR DELIVERIES    HEALTH BENEFIT: [facility] ALL SAVERS  ID#[id number] GRP#[id number] (EFFECTIVE 1/1/20)     PHARMACY BENEFIT: [facility]  PROCESSING INFO: ID#[id number] GRP#[id number] BIN#[id number] (EFFECTIVE 1/1/21)   DEDUCTIBLE $4000 & OUT-OF-POCKET $6000  COPAY STRUCTURE:  $0 FOR GENERIC  $0 FOR BRAND MEDICATIONS ONCE DEDUCTIBLE/OOP ARE MET    TESTCLAIM SPECIALTY #28:   MYCOPHENOLATE 250MG=$0,   PROGRAF 1MG=$0,   TACROLIMUS 1MG=$0,   CYCLOSPORINE 100MG=$0,   VALGANCICLOVIR 450MG=$0      Sanjay Huston, Pharm.D.  [facility]  [phone]

## 2021-11-29 NOTE — TELEPHONE ENCOUNTER
Call to patient for general follow-up post liver transplant.   Karin (daughter) was at her side, we had a 3 way conversation.   Demar is also at home but overwhelmed and not that helpful.      Appetite: poor - has continuous tube feeds at 40/hr.  She is eating small amounts.  She is drinking ensure.  I sent message to dietician about cycling tube feeds to give Berta time to be disconnected from tube feed pump and also allow her some time to get hungry, possibly eat more.  Diarrhea: 1 loose stool this morning, no stool yesterday, feels a little bloated.    Nausea: none  Activity: up in the house, on constant tube feeds so this impacts her mobility somewhat  Pain management: taking oxycodone 1-2 tab / day.  Encouraged tylenol, max 200 mg / day  Anxiety / depression: none  Physician follow-up: Encouraged appt w/ PCP, they will call today.  Appt w/ Dr. Schmitt on 12/6.  Lab frequency: 2 times per week.    Med changes:  Tac level was 19 on Saturday prior to discharge.  Her dose was reduced, she is having tremors.  I encouraged her to drink.  Homecare told them they have no availability to see her and do labs today so they moved their visits to Tues/ Frid.  I explained to Karin and Berta that this will not work as if she has tac level on Friday I wouldn't see the result until the following Monday.  They will have the homecare nurse call me tomorrow when she arrives.    Knows how to contact our office if they have concerns or questions.

## 2021-11-29 NOTE — PLAN OF CARE
Physical Therapy Discharge Summary    Reason for therapy discharge:    Discharged to home.  PT recommended and referral was placed.     Progress towards therapy goal(s). See goals on Care Plan in [facility] electronic health record for goal details.  Goals partially met.  Barriers to achieving goals:   discharge from facility.    Therapy recommendation(s):    Continued therapy is recommended.  Rationale/Recommendations:  progress ind, balance, and safety .

## 2021-11-29 NOTE — TELEPHONE ENCOUNTER
Terrell Perez (dietician):    Susana Cornejo, RD sent to Vanessa Durant, RN  Caller: Unspecified (Today,  9:48 AM)  Kamron Mendez,   How about cycling for 12 hours- either 70 or 75 ml/hr? Tell her I will stop in next Mon when she is here for [place] and I will see how she is doing with the cycle and if we can wean. Thanks!             Message to Anne Marie DAVIS to call  Home infusion and Berta to let them know we will decrease to 70 ml / hr for 12 hours (at night).

## 2021-11-29 NOTE — TELEPHONE ENCOUNTER
Post Discharge Liver Transplant Phone Call (within 1-3 business days post discharge)      Reviewed with the patient the Transplant Center contact information:  Best phone contact is [phone]-Friday from 8am-5pm.   *You may have to leave a message and get a call back.    Good alternative communication method is via xaitment message.    Coordinators are available 8am-5pm M-F, otherwise there will be an on-call RN available 24/7  *If calling after hours, please call [phone] and ask to speak with the on-call Transplant Coordinator    When to contact the Transplant Center:  - Fever > 100.5F  - Dizziness, lightheadedness  - Severe pain  - If you are unable to take your immunosuppression medications.  - Unable to obtain refill on immunosuppressive medications    Medications:  Reviewed medications with patient.   - confirmed pt has supply of meds  - MAR is up to date   - Verify preferred pharmacy in [place]  - Reminded patient to always contact the pharmacy first for refills    Confirmed the patient has an up to date medication card at home and is knowledgeable in updating their med card (or has someone to assist in this).   - Reinforced patient always bring med card to appointment      Labs:  Labs are expected to be drawn on Monday and Thursday until you are told differently by your transplant team.   - confirmed patient's preferred lab and updated EPIC   - Take a copy of your lab letter with to each lab draw   - Lab order sent directly to patient via [employer] or mail.    - Confirmed patient has a copy of lab letter  - Review how to review lab results on [employer] or obtaining and recording lab values in handbook    Vitals:  Encouraged the patient to record the following:  - BP - daily unless light headed  - Temperature - daily  - Weight - daily    Follow Up:    Role of the Transplant Coordinator vs LPN was reviewed. Contact information provided for both.     Reviewed current scheduled follow up appointments with  surgeon.      Reminded the patient to follow up with PCP within 1 -2 weeks for general follow-up and management of diabetes, pain, and blood pressure    STENT REMOVAL - reminded patient that if stent was placed at time of transplant (this is indicated on the check list) this will need to come out 2-3 mos post transplant.  Asked patient to notify transplant coordinator if sees stent in stool.    Has the patient been contacted with initial visit from HOME CARE? yes  o If not, Transplant Coordinator to be notified to follow up with Home Care  o Pt utilizes Sunita Miranda LPN

## 2021-11-30 ENCOUNTER — DOCUMENTATION ONLY (OUTPATIENT)
Dept: TRANSPLANT | Facility: CLINIC | Age: 56
End: 2021-11-30

## 2021-11-30 ENCOUNTER — LAB REQUISITION (OUTPATIENT)
Dept: LAB | Facility: CLINIC | Age: 56
End: 2021-11-30
Payer: COMMERCIAL

## 2021-11-30 ENCOUNTER — TELEPHONE (OUTPATIENT)
Dept: TRANSPLANT | Facility: CLINIC | Age: 56
End: 2021-11-30

## 2021-11-30 ENCOUNTER — LAB (OUTPATIENT)
Dept: LAB | Facility: CLINIC | Age: 56
End: 2021-11-30

## 2021-11-30 ENCOUNTER — TELEPHONE (OUTPATIENT)
Dept: TRANSPLANT | Facility: CLINIC | Age: 56
End: 2021-11-30
Payer: COMMERCIAL

## 2021-11-30 DIAGNOSIS — D64.9 ANEMIA: ICD-10-CM

## 2021-11-30 DIAGNOSIS — N18.9 ANEMIA DUE TO CHRONIC KIDNEY DISEASE: Primary | ICD-10-CM

## 2021-11-30 DIAGNOSIS — Z94.4 LIVER TRANSPLANTED (H): Primary | ICD-10-CM

## 2021-11-30 DIAGNOSIS — D63.1 ANEMIA DUE TO CHRONIC KIDNEY DISEASE: Primary | ICD-10-CM

## 2021-11-30 DIAGNOSIS — Z94.4 LIVER TRANSPLANT STATUS (H): ICD-10-CM

## 2021-11-30 DIAGNOSIS — Z79.899 OTHER LONG TERM (CURRENT) DRUG THERAPY: ICD-10-CM

## 2021-11-30 DIAGNOSIS — Z94.4 LIVER TRANSPLANTED (H): ICD-10-CM

## 2021-11-30 LAB
ABO/RH TYPE: NORMAL
ALBUMIN SERPL-MCNC: 1.8 G/DL (ref 3.4–5)
ALP SERPL-CCNC: 169 U/L (ref 40–150)
ALT SERPL W P-5'-P-CCNC: 21 U/L (ref 0–50)
ANION GAP SERPL CALCULATED.3IONS-SCNC: 7 MMOL/L (ref 3–14)
ANTIBODY SCREEN: NEGATIVE
AST SERPL W P-5'-P-CCNC: 11 U/L (ref 0–45)
BILIRUB DIRECT SERPL-MCNC: 0.5 MG/DL (ref 0–0.2)
BILIRUB SERPL-MCNC: 0.7 MG/DL (ref 0.2–1.3)
BLD PROD TYP BPU: NORMAL
BLOOD COMPONENT TYPE: NORMAL
BUN SERPL-MCNC: 41 MG/DL (ref 7–30)
CALCIUM SERPL-MCNC: 8.4 MG/DL (ref 8.5–10.1)
CHLORIDE BLD-SCNC: 106 MMOL/L (ref 94–109)
CO2 SERPL-SCNC: 24 MMOL/L (ref 20–32)
CODING SYSTEM: NORMAL
CREAT SERPL-MCNC: 1.02 MG/DL (ref 0.52–1.04)
CROSSMATCH: NORMAL
ERYTHROCYTE [DISTWIDTH] IN BLOOD BY AUTOMATED COUNT: 16.4 % (ref 10–15)
GFR SERPL CREATININE-BSD FRML MDRD: 61 ML/MIN/1.73M2
GLUCOSE BLD-MCNC: 124 MG/DL (ref 70–99)
HCT VFR BLD AUTO: 22.1 % (ref 35–47)
HGB BLD-MCNC: 6.8 G/DL (ref 11.7–15.7)
ISSUE DATE AND TIME: NORMAL
MAGNESIUM SERPL-MCNC: 1.7 MG/DL (ref 1.6–2.3)
MCH RBC QN AUTO: 29.2 PG (ref 26.5–33)
MCHC RBC AUTO-ENTMCNC: 30.8 G/DL (ref 31.5–36.5)
MCV RBC AUTO: 95 FL (ref 78–100)
PHOSPHATE SERPL-MCNC: 3.7 MG/DL (ref 2.5–4.5)
PLATELET # BLD AUTO: 509 10E3/UL (ref 150–450)
POTASSIUM BLD-SCNC: 4.5 MMOL/L (ref 3.4–5.3)
PROT SERPL-MCNC: 5.7 G/DL (ref 6.8–8.8)
RBC # BLD AUTO: 2.33 10E6/UL (ref 3.8–5.2)
SARS-COV-2 RNA RESP QL NAA+PROBE: NEGATIVE
SODIUM SERPL-SCNC: 137 MMOL/L (ref 133–144)
SPECIMEN EXPIRATION DATE: NORMAL
SPECIMEN EXPIRATION DATE: NORMAL
TACROLIMUS BLD-MCNC: 9.3 UG/L (ref 5–15)
TME LAST DOSE: NORMAL H
TME LAST DOSE: NORMAL H
UNIT ABO/RH: NORMAL
UNIT NUMBER: NORMAL
UNIT STATUS: NORMAL
UNIT TYPE ISBT: 8400
WBC # BLD AUTO: 13.1 10E3/UL (ref 4–11)

## 2021-11-30 PROCEDURE — U0003 INFECTIOUS AGENT DETECTION BY NUCLEIC ACID (DNA OR RNA); SEVERE ACUTE RESPIRATORY SYNDROME CORONAVIRUS 2 (SARS-COV-2) (CORONAVIRUS DISEASE [COVID-19]), AMPLIFIED PROBE TECHNIQUE, MAKING USE OF HIGH THROUGHPUT TECHNOLOGIES AS DESCRIBED BY CMS-2020-01-R: HCPCS | Performed by: TRANSPLANT SURGERY

## 2021-11-30 PROCEDURE — 83735 ASSAY OF MAGNESIUM: CPT | Performed by: TRANSPLANT SURGERY

## 2021-11-30 PROCEDURE — 84100 ASSAY OF PHOSPHORUS: CPT | Performed by: TRANSPLANT SURGERY

## 2021-11-30 PROCEDURE — 86901 BLOOD TYPING SEROLOGIC RH(D): CPT | Performed by: TRANSPLANT SURGERY

## 2021-11-30 PROCEDURE — 36415 COLL VENOUS BLD VENIPUNCTURE: CPT | Performed by: PATHOLOGY

## 2021-11-30 PROCEDURE — 80197 ASSAY OF TACROLIMUS: CPT | Performed by: TRANSPLANT SURGERY

## 2021-11-30 PROCEDURE — 85027 COMPLETE CBC AUTOMATED: CPT | Performed by: TRANSPLANT SURGERY

## 2021-11-30 PROCEDURE — 82248 BILIRUBIN DIRECT: CPT | Performed by: TRANSPLANT SURGERY

## 2021-11-30 PROCEDURE — 86900 BLOOD TYPING SEROLOGIC ABO: CPT | Performed by: PATHOLOGY

## 2021-11-30 PROCEDURE — 86901 BLOOD TYPING SEROLOGIC RH(D): CPT | Performed by: PATHOLOGY

## 2021-11-30 PROCEDURE — 86923 COMPATIBILITY TEST ELECTRIC: CPT | Performed by: TRANSPLANT SURGERY

## 2021-11-30 RX ORDER — ASPIRIN 325 MG
325 TABLET ORAL DAILY
Qty: 90 TABLET | Refills: 3 | Status: SHIPPED | OUTPATIENT
Start: 2021-11-30 | End: 2022-01-07

## 2021-11-30 RX ORDER — HEPARIN SODIUM,PORCINE 10 UNIT/ML
5 VIAL (ML) INTRAVENOUS
Status: CANCELLED | OUTPATIENT
Start: 2021-11-30

## 2021-11-30 RX ORDER — SULFAMETHOXAZOLE AND TRIMETHOPRIM 400; 80 MG/1; MG/1
1 TABLET ORAL DAILY
Qty: 90 TABLET | Refills: 3 | Status: SHIPPED | OUTPATIENT
Start: 2021-11-30 | End: 2022-04-21

## 2021-11-30 RX ORDER — TACROLIMUS 1 MG/1
1 CAPSULE ORAL 2 TIMES DAILY
Qty: 180 CAPSULE | Refills: 3 | Status: SHIPPED | OUTPATIENT
Start: 2021-11-30 | End: 2021-12-07

## 2021-11-30 RX ORDER — HEPARIN SODIUM (PORCINE) LOCK FLUSH IV SOLN 100 UNIT/ML 100 UNIT/ML
5 SOLUTION INTRAVENOUS
Status: CANCELLED | OUTPATIENT
Start: 2021-11-30

## 2021-11-30 RX ORDER — PANTOPRAZOLE SODIUM 40 MG/1
40 TABLET, DELAYED RELEASE ORAL 2 TIMES DAILY
Qty: 180 TABLET | Refills: 3 | Status: SHIPPED | OUTPATIENT
Start: 2021-11-30 | End: 2022-01-17

## 2021-11-30 RX ORDER — MAGNESIUM OXIDE 400 MG/1
400 TABLET ORAL 2 TIMES DAILY
Qty: 180 TABLET | Refills: 3 | Status: SHIPPED | OUTPATIENT
Start: 2021-11-30 | End: 2022-01-17

## 2021-11-30 RX ORDER — MYCOPHENOLATE MOFETIL 250 MG/1
750 CAPSULE ORAL 2 TIMES DAILY
Qty: 540 CAPSULE | Refills: 3 | Status: SHIPPED | OUTPATIENT
Start: 2021-11-30 | End: 2022-02-17

## 2021-11-30 RX ORDER — VALGANCICLOVIR 450 MG/1
450 TABLET, FILM COATED ORAL DAILY
Qty: 90 TABLET | Refills: 3 | Status: SHIPPED | OUTPATIENT
Start: 2021-11-30 | End: 2022-01-17

## 2021-11-30 RX ORDER — FUROSEMIDE 20 MG
20 TABLET ORAL DAILY
Qty: 90 TABLET | Refills: 1 | Status: SHIPPED | OUTPATIENT
Start: 2021-11-30 | End: 2021-12-07

## 2021-11-30 RX ORDER — URSODIOL 300 MG/1
300 CAPSULE ORAL 2 TIMES DAILY
Qty: 180 CAPSULE | Refills: 3 | Status: SHIPPED | OUTPATIENT
Start: 2021-11-30 | End: 2022-05-24

## 2021-11-30 NOTE — PROGRESS NOTES
This is a recent snapshot of the patient's [facility] medical record.  For current drug dose and complete information and questions, call [phone] or In Basket pool, fv home infusion ([id number])  CSN Number:  [id number]

## 2021-11-30 NOTE — PROGRESS NOTES
This is a recent snapshot of the patient's [facility] medical record.  For current drug dose and complete information and questions, call [phone] or In Basket pool, fv home infusion ([id number])  CSN Number:  [id number]

## 2021-11-30 NOTE — TELEPHONE ENCOUNTER
Confirmed with Allyssa, charge nurse that pt can be seen for packed RBC's tomorrow at 1pm in the BMT department, but pt will need the T&C with a covid test today.     Writer spoke to Jennifer and requested she plan to have blood drawn for T &C and covid today for a transfusion scheduled for tomorrow. Requested they go to the [facility] and writer notified the [facility]SC of the appt.

## 2021-11-30 NOTE — TELEPHONE ENCOUNTER
Hgb 6.8.  Dr. Schmitt would like transfusion, 1 unit PRBC's.  Berta and her daughter would prefer that this be done at [facility]  Therapy plan entered. COVID order placed.  Will need to go for T&C and covid test today at [facility].

## 2021-11-30 NOTE — PROGRESS NOTES
This is a recent snapshot of the patient's [facility] medical record.  For current drug dose and complete information and questions, call [phone] or In Basket pool, fv home infusion ([id number])  CSN Number:  [id number]

## 2021-11-30 NOTE — TELEPHONE ENCOUNTER
Call from Berta / Karin and homecare nurse.  Tube feedings have been changed to cycled, 70 ml / hr for 12 hours.  Berta is constipated.  Has not had a good bowel movement since going home.  Took stool softeners and miralax last night, will do again this morning.  I suggested a fleets enema.  They willl wait until noon today, if no results from oral treatment will do fleets.  All else good.

## 2021-12-01 ENCOUNTER — TELEPHONE (OUTPATIENT)
Dept: INTERNAL MEDICINE | Facility: CLINIC | Age: 56
End: 2021-12-01
Payer: COMMERCIAL

## 2021-12-01 ENCOUNTER — INFUSION THERAPY VISIT (OUTPATIENT)
Dept: TRANSPLANT | Facility: CLINIC | Age: 56
End: 2021-12-01
Payer: COMMERCIAL

## 2021-12-01 VITALS
HEART RATE: 83 BPM | WEIGHT: 132.1 LBS | SYSTOLIC BLOOD PRESSURE: 149 MMHG | BODY MASS INDEX: 21.98 KG/M2 | TEMPERATURE: 98.3 F | OXYGEN SATURATION: 100 % | DIASTOLIC BLOOD PRESSURE: 89 MMHG | RESPIRATION RATE: 16 BRPM

## 2021-12-01 DIAGNOSIS — D63.1 ANEMIA DUE TO CHRONIC KIDNEY DISEASE: Primary | ICD-10-CM

## 2021-12-01 DIAGNOSIS — N18.9 ANEMIA DUE TO CHRONIC KIDNEY DISEASE: Primary | ICD-10-CM

## 2021-12-01 PROCEDURE — P9016 RBC LEUKOCYTES REDUCED: HCPCS

## 2021-12-01 PROCEDURE — 999N000128 HC STATISTIC PERIPHERAL IV START W/O US GUIDANCE

## 2021-12-01 PROCEDURE — 86923 COMPATIBILITY TEST ELECTRIC: CPT | Performed by: TRANSPLANT SURGERY

## 2021-12-01 PROCEDURE — 36430 TRANSFUSION BLD/BLD COMPNT: CPT

## 2021-12-01 ASSESSMENT — PAIN SCALES - GENERAL: PAINLEVEL: MODERATE PAIN (4)

## 2021-12-01 NOTE — TELEPHONE ENCOUNTER
Call to Blanca and left detailed message.     Verbal order given to approve visits until certification received for MD signature.

## 2021-12-01 NOTE — TELEPHONE ENCOUNTER
[facility] ([phone]) calls for verbal orders for PT 1xwk for 2wks to help with lower extremity and balance.  Ok to leave a detailed message.

## 2021-12-01 NOTE — PROGRESS NOTES
Infusion Nursing Note:  Berta Nava presents today for RBC tansfusion.    Patient seen by provider today: No   present during visit today: Not Applicable.    Note: Vss, piv placed. 1unit RBC transfused. Pt tolerated transfusion.       Intravenous Access:  Peripheral IV placed.    Treatment Conditions:  Results reviewed, labs MET treatment parameters, ok to proceed with treatment.      Post Infusion Assessment:  Patient tolerated infusion without incident.       Discharge Plan:   Patient discharged in stable condition accompanied by: daughter.  Departure Mode: Ambulatory.      Dameon Trujillo RN

## 2021-12-01 NOTE — NURSING NOTE
"Oncology Rooming Note    December 1, 2021 1:45 PM   Berta Nava is a 56 year old female who presents for:    Chief Complaint   Patient presents with     Infusion     Scheduled RBCs r/t Neoplasm of uncertain behavior of skin.      Initial Vitals: /71 (BP Location: Right arm, Patient Position: Sitting)   Pulse 91   Temp 98  F (36.7  C) (Oral)   Resp 16   Wt 59.9 kg (132 lb 1.6 oz)   LMP  (LMP Unknown)   SpO2 100%   BMI 21.98 kg/m   Estimated body mass index is 21.98 kg/m  as calculated from the following:    Height as of 11/17/21: 1.651 m (5' 5\").    Weight as of this encounter: 59.9 kg (132 lb 1.6 oz). Body surface area is 1.66 meters squared.  Moderate Pain (4) Comment: Data Unavailable   No LMP recorded (lmp unknown). Patient is postmenopausal.  Allergies reviewed: Yes  Medications reviewed: Yes    Medications: Medication refills not needed today.  Pharmacy name entered into EPIC:    [facility] [place], [place] - [address] [id number] - [place], [place] - [address] [facility] [address]    Clinical concerns: VSS. No clinical concerns.      Airam Jhaveri RN              "

## 2021-12-02 ENCOUNTER — TELEPHONE (OUTPATIENT)
Dept: TRANSPLANT | Facility: CLINIC | Age: 56
End: 2021-12-02

## 2021-12-02 ENCOUNTER — VIRTUAL VISIT (OUTPATIENT)
Dept: PHARMACY | Facility: CLINIC | Age: 56
End: 2021-12-02
Attending: TRANSPLANT SURGERY
Payer: COMMERCIAL

## 2021-12-02 ENCOUNTER — LAB REQUISITION (OUTPATIENT)
Dept: LAB | Facility: CLINIC | Age: 56
End: 2021-12-02
Payer: COMMERCIAL

## 2021-12-02 DIAGNOSIS — R19.7 DIARRHEA, UNSPECIFIED TYPE: ICD-10-CM

## 2021-12-02 DIAGNOSIS — Z48.23 ENCOUNTER FOR AFTERCARE FOLLOWING LIVER TRANSPLANT (H): ICD-10-CM

## 2021-12-02 DIAGNOSIS — Z94.4 LIVER TRANSPLANTED (H): Primary | ICD-10-CM

## 2021-12-02 DIAGNOSIS — D64.9 ANEMIA, UNSPECIFIED TYPE: ICD-10-CM

## 2021-12-02 DIAGNOSIS — G89.18 ACUTE POST-OPERATIVE PAIN: ICD-10-CM

## 2021-12-02 DIAGNOSIS — R63.0 LOSS OF APPETITE: ICD-10-CM

## 2021-12-02 DIAGNOSIS — R63.0 POOR APPETITE: Primary | ICD-10-CM

## 2021-12-02 DIAGNOSIS — Z94.4 S/P LIVER TRANSPLANT (H): Primary | ICD-10-CM

## 2021-12-02 LAB
ALBUMIN SERPL-MCNC: 2 G/DL (ref 3.4–5)
ALP SERPL-CCNC: 131 U/L (ref 40–150)
ALT SERPL W P-5'-P-CCNC: 20 U/L (ref 0–50)
ANION GAP SERPL CALCULATED.3IONS-SCNC: 5 MMOL/L (ref 3–14)
AST SERPL W P-5'-P-CCNC: 13 U/L (ref 0–45)
BILIRUB DIRECT SERPL-MCNC: 0.5 MG/DL (ref 0–0.2)
BILIRUB SERPL-MCNC: 0.7 MG/DL (ref 0.2–1.3)
BUN SERPL-MCNC: 28 MG/DL (ref 7–30)
CALCIUM SERPL-MCNC: 8.8 MG/DL (ref 8.5–10.1)
CHLORIDE BLD-SCNC: 111 MMOL/L (ref 94–109)
CO2 SERPL-SCNC: 24 MMOL/L (ref 20–32)
CREAT SERPL-MCNC: 0.78 MG/DL (ref 0.52–1.04)
ERYTHROCYTE [DISTWIDTH] IN BLOOD BY AUTOMATED COUNT: 16 % (ref 10–15)
GFR SERPL CREATININE-BSD FRML MDRD: 84 ML/MIN/1.73M2
GLUCOSE BLD-MCNC: 114 MG/DL (ref 70–99)
HCT VFR BLD AUTO: 28.7 % (ref 35–47)
HGB BLD-MCNC: 9.2 G/DL (ref 11.7–15.7)
MAGNESIUM SERPL-MCNC: 1.7 MG/DL (ref 1.6–2.3)
MCH RBC QN AUTO: 29 PG (ref 26.5–33)
MCHC RBC AUTO-ENTMCNC: 32.1 G/DL (ref 31.5–36.5)
MCV RBC AUTO: 91 FL (ref 78–100)
PHOSPHATE SERPL-MCNC: 3 MG/DL (ref 2.5–4.5)
PLATELET # BLD AUTO: 497 10E3/UL (ref 150–450)
POTASSIUM BLD-SCNC: 4.7 MMOL/L (ref 3.4–5.3)
PROT SERPL-MCNC: 6 G/DL (ref 6.8–8.8)
RBC # BLD AUTO: 3.17 10E6/UL (ref 3.8–5.2)
SODIUM SERPL-SCNC: 140 MMOL/L (ref 133–144)
TACROLIMUS BLD-MCNC: 6.6 UG/L (ref 5–15)
TME LAST DOSE: NORMAL H
TME LAST DOSE: NORMAL H
WBC # BLD AUTO: 10.6 10E3/UL (ref 4–11)

## 2021-12-02 PROCEDURE — 80197 ASSAY OF TACROLIMUS: CPT | Mod: ORL | Performed by: TRANSPLANT SURGERY

## 2021-12-02 PROCEDURE — 85027 COMPLETE CBC AUTOMATED: CPT | Mod: ORL | Performed by: TRANSPLANT SURGERY

## 2021-12-02 PROCEDURE — 99605 MTMS BY PHARM NP 15 MIN: CPT | Performed by: PHARMACIST

## 2021-12-02 PROCEDURE — 84100 ASSAY OF PHOSPHORUS: CPT | Mod: ORL | Performed by: TRANSPLANT SURGERY

## 2021-12-02 PROCEDURE — 83735 ASSAY OF MAGNESIUM: CPT | Mod: ORL | Performed by: TRANSPLANT SURGERY

## 2021-12-02 PROCEDURE — 80053 COMPREHEN METABOLIC PANEL: CPT | Mod: ORL | Performed by: TRANSPLANT SURGERY

## 2021-12-02 PROCEDURE — 82248 BILIRUBIN DIRECT: CPT | Mod: ORL | Performed by: TRANSPLANT SURGERY

## 2021-12-02 PROCEDURE — 99607 MTMS BY PHARM ADDL 15 MIN: CPT | Performed by: PHARMACIST

## 2021-12-02 RX ORDER — MIRTAZAPINE 15 MG/1
15 TABLET, FILM COATED ORAL AT BEDTIME
Qty: 30 TABLET | Refills: 0 | Status: SHIPPED | OUTPATIENT
Start: 2021-12-02 | End: 2021-12-27

## 2021-12-02 RX ORDER — PREDNISONE 5 MG/1
5 TABLET ORAL DAILY
Qty: 90 TABLET | Refills: 3 | Status: SHIPPED | OUTPATIENT
Start: 2021-12-02 | End: 2021-12-13

## 2021-12-02 NOTE — PATIENT INSTRUCTIONS
Recommendations from today's MTM visit:                                                      1. Take Mirtazapine 15mg at bedtime, this will improve appetite and make you drowsy.     Follow-up: 2-3 months    It was great to speak with you today.  I value your experience and would be very thankful for your time with providing feedback on our clinic survey. You may receive a survey via email or text message in the next few days.     To schedule another MTM appointment, please call the clinic directly or you may call the MTM scheduling line at [phone] or toll-free at [phone].     My Clinical Pharmacist's contact information:                                                      Please feel free to contact me with any questions or concerns you have.      Stalin Gabriel, PharmD  MTM Pharmacist    Phone: [phone]

## 2021-12-02 NOTE — TELEPHONE ENCOUNTER
Call to Berta / Karin to see how they are doing.  No answer on Berta's phone, called Karin.    Karin says her mom is doing better today.  Went to [facility] yesterday for blood transfusion.  Is having a better day today, yesterday was a very long day for her.   Is not eating well.  Sent script to [facility] for remeron for appetite stimulation.  Continues w/ night time feedings.   Also having bilat hand and leg tremors.  Awaiting tac level.

## 2021-12-02 NOTE — PROGRESS NOTES
Medication Therapy Management (MTM) Encounter    ASSESSMENT:                            Medication Adherence/Access: No issues identified    Liver Transplant:  Phosphorus levels normal, on low dose phosphorus ,recommend discontinuing.     Anemia: Stable.     Diarrhea: Stable.     Appetite: Discussed Mirtazapine, recommend she take this in the evening as it can cause drowsiness.      Pain: well controlled.     PLAN:                            Pt to...  1. Take Mirtazapine 15mg at bedtime, this will improve appetite and make you drowsy.     txp team...  1. Consider stopping Phosphorus    Follow-up: 2-3 months      SUBJECTIVE/OBJECTIVE:                          Berta Nava is a 56 year old female called for a transitions of care visit. She was discharged from [facility] on 11/27 for liver txp.      Reason for visit: initial post txp med review.    Allergies/ADRs: Reviewed in chart  Past Medical History: Reviewed in chart  Tobacco: She reports that she has never smoked. She has never used smokeless tobacco.  Alcohol: not currently using    Medication Adherence/Access: Patient uses pill box(es) and has assistance with medication administration: family member daughter Karin. Notifications on phone.   Patient takes medications 4 time(s) per day. 8am, 12, 6, 8pm  Per patient, misses medication 0 times per week.   Medication barriers: none.   The patient fills medications at [facility]: NO, fills medications at [facility].    Liver Transplant:  Current immunosuppressants include Tacrolimus 1mg twice daily, Prednisone 5mg daily starting today, and Mycophenolate Mofetil 750mg twice daily.  Pt reports Tremors.   Vascular prophylaxis: Aspirin 325mg daily . Denies bleed sx.   Other meds: Pt is taking Ursodiol 300mg twice daily.   Transplant date: 11/18/21  Estimated Creatinine Clearance: 74.3 mL/min (based on SCr of 0.78 mg/dL).  CMV prophylaxis: CrCl 40 to 59 mL/minute: Valcyte 450 mg once daily Donor (+), Recipient (-), treat 6 months  post tx   PCP prophylaxis: Bactrim S S daily for 6 months post txp  PPI use: Pantoprazole 40mg twice daily. Denies GERD sx.   Supplements: Mag Oxide 400mg twice daily ( 2 hours from MMF) and MVI daily .  Tx Coordinator: Romario Blankenship MD: Dr. Schmitt, Using Med Card: Yes  Recent Infections: Intraabdominal abscess  Pt is taking Doxycycline 100mg twice daily, should be finishing today or tomorrow.  Immunizations: annual flu shot unknown; [id number]:  unknown; Prevnar 13: 2021; TDaP:  2014; Shingrix: 2021x1, HBV: no immunity, COVID: Moderna 2x2021    Anemia: Had a blood transfusion yesterday when her HGB dropped to 6.8.  Hemoglobin   Date Value Ref Range Status   12/02/2021 9.2 (L) 11.7 - 15.7 g/dL Final   06/25/2021 10.7 (L) 11.7 - 15.7 g/dL Final     Diarrhea: Pt is holding Senna and Miralax. Having diarrhea, had 1-2 loose stools daily. Patient has a feeding tube which she gets protein and fiber through.     Appetite: Pt was started on Mirtazapine 15mg at bedtime today, but was not aware of it.  is picking up at pharmacy. For appetite, starting today. Patient has low appetite, currently on tube feedings.     Pain: Pt is taking Acetaminophen 650mg prn (last dose was 1AM this morning), not using oxycodone currently. 2/10 currently.    Today's Vitals: LMP  (LMP Unknown)   ----------------  Post Discharge Medication Reconciliation Status: discharge medications reconciled and changed, per note/orders.    I spent 26 minutes with this patient today. I offer these suggestions for consideration by txp team. A copy of the visit note was provided to the patient's referring provider.    The patient was sent via [employer] a summary of these recommendations.     Stalin Gabriel, PharmD  [facility] Pharmacist    Phone: [phone]     Telemedicine Visit Details  Type of service:  Telephone visit  Start Time: 3:34 PM  End Time: 4:00 PM  Originating Location (patient location): [place]  Distant Location (provider location):    [facility] SPECIAL[facility] MTM     Medication Therapy Recommendations  Loss of appetite    Current Medication: mirtazapine (REMERON) 15 MG tablet   Rationale: Does not understand instructions - Adherence - Adherence   Recommendation: Provide Education   Status: Patient Agreed - Adherence/Education         S/P liver transplant (H)    Current Medication: phosphorus tablet 250 mg (PHOSPHA 250 NEUTRAL) 250 MG per tablet   Rationale: No medical indication at this time - Unnecessary medication therapy - Indication   Recommendation: Discontinue Medication   Status: Contact Provider - Awaiting Response

## 2021-12-02 NOTE — TELEPHONE ENCOUNTER
Post Liver Transplant Team Conference  Date: 12/2/2021  Transplant Coordinator: Vanessa Durant  Transplant Surgeon: Ernesto Schmitt      Attendees:  [] Dr. Mackay [] Dr. Traylor []       [x] Dr. Schmitt [x] Anne Marie Miranda LPN []    [] Dr. Gotti [] Andria Rowe NP []    [] Dr. Bernard [] Milady Rodriguez NP []    [] Dr. Wilks [] James Mueller []       [] Dr. Puri [x] Monalisa Bar, RN []       [] Dr. Ben Walker [x] Vanessa Durant, RN []       [x] Dr. YOHANNES Rojas [x] Lucy Shabazz RN []       [] Dr. Peterson [x] Lydia Reyes RN []       [] Dr. Blair [] Aaron Lazar, ADRIANA []         Verbal Plan Read Back:   Transplanted for PSC (has UC also).  Per Oskar, add prednisone 5 mg daily.      Vanessa Durant, RN

## 2021-12-03 ENCOUNTER — TELEPHONE (OUTPATIENT)
Dept: TRANSPLANT | Facility: CLINIC | Age: 56
End: 2021-12-03
Payer: COMMERCIAL

## 2021-12-03 ENCOUNTER — HOSPITAL ENCOUNTER (EMERGENCY)
Facility: CLINIC | Age: 56
Discharge: HOME | End: 2021-12-03
Attending: FAMILY MEDICINE | Admitting: FAMILY MEDICINE
Payer: COMMERCIAL

## 2021-12-03 VITALS
TEMPERATURE: 98.3 F | DIASTOLIC BLOOD PRESSURE: 70 MMHG | RESPIRATION RATE: 18 BRPM | HEART RATE: 82 BPM | OXYGEN SATURATION: 100 % | SYSTOLIC BLOOD PRESSURE: 115 MMHG

## 2021-12-03 DIAGNOSIS — R10.84 ABDOMINAL PAIN, GENERALIZED: ICD-10-CM

## 2021-12-03 DIAGNOSIS — E86.0 DEHYDRATION: ICD-10-CM

## 2021-12-03 DIAGNOSIS — Z94.4 S/P LIVER TRANSPLANT (H): ICD-10-CM

## 2021-12-03 DIAGNOSIS — R19.7 DIARRHEA, UNSPECIFIED TYPE: ICD-10-CM

## 2021-12-03 LAB
ALBUMIN SERPL-MCNC: 1.8 G/DL (ref 3.4–5)
ALBUMIN UR-MCNC: 10 MG/DL
ALP SERPL-CCNC: 106 U/L (ref 40–150)
ALT SERPL W P-5'-P-CCNC: 17 U/L (ref 0–50)
ANION GAP SERPL CALCULATED.3IONS-SCNC: 6 MMOL/L (ref 3–14)
APPEARANCE UR: CLEAR
APTT PPP: 36 SECONDS (ref 22–38)
AST SERPL W P-5'-P-CCNC: 17 U/L (ref 0–45)
BASOPHILS # BLD AUTO: 0.1 10E3/UL (ref 0–0.2)
BASOPHILS NFR BLD AUTO: 1 %
BILIRUB SERPL-MCNC: 0.5 MG/DL (ref 0.2–1.3)
BILIRUB UR QL STRIP: NEGATIVE
BUN SERPL-MCNC: 24 MG/DL (ref 7–30)
C COLI+JEJUNI+LARI FUSA STL QL NAA+PROBE: NOT DETECTED
C DIFF TOX B STL QL: NEGATIVE
CALCIUM SERPL-MCNC: 8.4 MG/DL (ref 8.5–10.1)
CHLORIDE BLD-SCNC: 113 MMOL/L (ref 94–109)
CO2 SERPL-SCNC: 23 MMOL/L (ref 20–32)
COLOR UR AUTO: YELLOW
CREAT SERPL-MCNC: 0.65 MG/DL (ref 0.52–1.04)
EC STX1 GENE STL QL NAA+PROBE: NOT DETECTED
EC STX2 GENE STL QL NAA+PROBE: NOT DETECTED
EOSINOPHIL # BLD AUTO: 0.2 10E3/UL (ref 0–0.7)
EOSINOPHIL NFR BLD AUTO: 1 %
ERYTHROCYTE [DISTWIDTH] IN BLOOD BY AUTOMATED COUNT: 15.9 % (ref 10–15)
GFR SERPL CREATININE-BSD FRML MDRD: >90 ML/MIN/1.73M2
GLUCOSE BLD-MCNC: 97 MG/DL (ref 70–99)
GLUCOSE UR STRIP-MCNC: NEGATIVE MG/DL
HCT VFR BLD AUTO: 23.7 % (ref 35–47)
HGB BLD-MCNC: 7.6 G/DL (ref 11.7–15.7)
HGB UR QL STRIP: NEGATIVE
HOLD SPECIMEN: NORMAL
HYALINE CASTS: 3 /LPF
IMM GRANULOCYTES # BLD: 0.1 10E3/UL
IMM GRANULOCYTES NFR BLD: 1 %
INR PPP: 1.13 (ref 0.85–1.15)
KETONES UR STRIP-MCNC: NEGATIVE MG/DL
LACTATE SERPL-SCNC: 0.6 MMOL/L (ref 0.7–2)
LEUKOCYTE ESTERASE UR QL STRIP: NEGATIVE
LIPASE SERPL-CCNC: 155 U/L (ref 73–393)
LYMPHOCYTES # BLD AUTO: 0.3 10E3/UL (ref 0.8–5.3)
LYMPHOCYTES NFR BLD AUTO: 2 %
MAGNESIUM SERPL-MCNC: 1.7 MG/DL (ref 1.6–2.3)
MCH RBC QN AUTO: 29 PG (ref 26.5–33)
MCHC RBC AUTO-ENTMCNC: 32.1 G/DL (ref 31.5–36.5)
MCV RBC AUTO: 91 FL (ref 78–100)
MONOCYTES # BLD AUTO: 0.5 10E3/UL (ref 0–1.3)
MONOCYTES NFR BLD AUTO: 4 %
MUCOUS THREADS #/AREA URNS LPF: PRESENT /LPF
NEUTROPHILS # BLD AUTO: 11.5 10E3/UL (ref 1.6–8.3)
NEUTROPHILS NFR BLD AUTO: 91 %
NITRATE UR QL: NEGATIVE
NOROV GI+II ORF1-ORF2 JNC STL QL NAA+PR: NOT DETECTED
NRBC # BLD AUTO: 0 10E3/UL
NRBC BLD AUTO-RTO: 0 /100
PH UR STRIP: 6 [PH] (ref 5–7)
PHOSPHATE SERPL-MCNC: 3.5 MG/DL (ref 2.5–4.5)
PLATELET # BLD AUTO: 396 10E3/UL (ref 150–450)
POTASSIUM BLD-SCNC: 4.4 MMOL/L (ref 3.4–5.3)
PROT SERPL-MCNC: 5.5 G/DL (ref 6.8–8.8)
RBC # BLD AUTO: 2.62 10E6/UL (ref 3.8–5.2)
RBC URINE: 1 /HPF
RVA NSP5 STL QL NAA+PROBE: NOT DETECTED
SALMONELLA SP RPOD STL QL NAA+PROBE: NOT DETECTED
SHIGELLA SP+EIEC IPAH STL QL NAA+PROBE: NOT DETECTED
SODIUM SERPL-SCNC: 142 MMOL/L (ref 133–144)
SP GR UR STRIP: 1.02 (ref 1–1.03)
SQUAMOUS EPITHELIAL: <1 /HPF
UROBILINOGEN UR STRIP-MCNC: NORMAL MG/DL
V CHOL+PARA RFBL+TRKH+TNAA STL QL NAA+PR: NOT DETECTED
WBC # BLD AUTO: 12.7 10E3/UL (ref 4–11)
WBC URINE: 1 /HPF
Y ENTERO RECN STL QL NAA+PROBE: NOT DETECTED

## 2021-12-03 PROCEDURE — 99284 EMERGENCY DEPT VISIT MOD MDM: CPT | Performed by: FAMILY MEDICINE

## 2021-12-03 PROCEDURE — 258N000003 HC RX IP 258 OP 636: Performed by: FAMILY MEDICINE

## 2021-12-03 PROCEDURE — 85610 PROTHROMBIN TIME: CPT | Performed by: FAMILY MEDICINE

## 2021-12-03 PROCEDURE — 99207 PR SATISFY VISIT NUMBER: CPT | Performed by: SURGERY

## 2021-12-03 PROCEDURE — 82040 ASSAY OF SERUM ALBUMIN: CPT | Performed by: FAMILY MEDICINE

## 2021-12-03 PROCEDURE — 87493 C DIFF AMPLIFIED PROBE: CPT | Performed by: FAMILY MEDICINE

## 2021-12-03 PROCEDURE — 96361 HYDRATE IV INFUSION ADD-ON: CPT | Performed by: FAMILY MEDICINE

## 2021-12-03 PROCEDURE — 99283 EMERGENCY DEPT VISIT LOW MDM: CPT | Mod: 25 | Performed by: FAMILY MEDICINE

## 2021-12-03 PROCEDURE — 85730 THROMBOPLASTIN TIME PARTIAL: CPT | Performed by: FAMILY MEDICINE

## 2021-12-03 PROCEDURE — 87506 IADNA-DNA/RNA PROBE TQ 6-11: CPT | Performed by: FAMILY MEDICINE

## 2021-12-03 PROCEDURE — 84100 ASSAY OF PHOSPHORUS: CPT | Performed by: FAMILY MEDICINE

## 2021-12-03 PROCEDURE — 83735 ASSAY OF MAGNESIUM: CPT | Performed by: FAMILY MEDICINE

## 2021-12-03 PROCEDURE — 83605 ASSAY OF LACTIC ACID: CPT | Performed by: FAMILY MEDICINE

## 2021-12-03 PROCEDURE — 81001 URINALYSIS AUTO W/SCOPE: CPT | Performed by: FAMILY MEDICINE

## 2021-12-03 PROCEDURE — 83690 ASSAY OF LIPASE: CPT | Performed by: FAMILY MEDICINE

## 2021-12-03 PROCEDURE — 96360 HYDRATION IV INFUSION INIT: CPT | Performed by: FAMILY MEDICINE

## 2021-12-03 PROCEDURE — 36415 COLL VENOUS BLD VENIPUNCTURE: CPT | Performed by: FAMILY MEDICINE

## 2021-12-03 PROCEDURE — 87040 BLOOD CULTURE FOR BACTERIA: CPT | Performed by: FAMILY MEDICINE

## 2021-12-03 PROCEDURE — 85025 COMPLETE CBC W/AUTO DIFF WBC: CPT | Performed by: FAMILY MEDICINE

## 2021-12-03 RX ORDER — LIDOCAINE 40 MG/G
CREAM TOPICAL
Status: DISCONTINUED | OUTPATIENT
Start: 2021-12-03 | End: 2021-12-03 | Stop reason: HOSPADM

## 2021-12-03 RX ADMIN — SODIUM CHLORIDE 1000 ML: 9 INJECTION, SOLUTION INTRAVENOUS at 11:59

## 2021-12-03 RX ADMIN — SODIUM CHLORIDE 1000 ML: 9 INJECTION, SOLUTION INTRAVENOUS at 14:44

## 2021-12-03 ASSESSMENT — ENCOUNTER SYMPTOMS
SHORTNESS OF BREATH: 0
NAUSEA: 0
COUGH: 0
WOUND: 1
JOINT SWELLING: 0
DYSPHORIC MOOD: 0
ABDOMINAL PAIN: 1
DYSURIA: 0
TREMORS: 1
FATIGUE: 1
WEAKNESS: 1
FLANK PAIN: 0
APPETITE CHANGE: 1
DIARRHEA: 1
DECREASED CONCENTRATION: 0
BRUISES/BLEEDS EASILY: 0
VOMITING: 0
ACTIVITY CHANGE: 1
LIGHT-HEADEDNESS: 1
HALLUCINATIONS: 0
CONFUSION: 0
TROUBLE SWALLOWING: 0
FEVER: 0
CHILLS: 0

## 2021-12-03 NOTE — ED TRIAGE NOTES
abd pain, concern for bowel obs, liver tx 11/2021, n/d but no vomit, told to come to ED by care team

## 2021-12-03 NOTE — ED PROVIDER NOTES
[place] EMERGENCY DEPARTMENT ([facility])  21  History     Chief Complaint   Patient presents with     Abdominal Pain     liver tx , shaking, her care team told her to come to ED     HPI  Berta Nava is a 56 year old female with a past medical history significant for chronic liver failure (liver transplant aborted in 2021 d/t V tach. S/p  donor liver transplant with biliary stent on 21), cholangitis, primary sclerosing cholangitis, ulcerative pancolitis, cirrhosis of the liver, hypertension, leukocytosis, and sepsis who presents to the Emergency Department for evaluation of 15 days of tremors/shaking, two days of abdominal pain and six episodes of watery diarrhea. The patient reports that since the time of her surgery she has had tremors and all over shaking that her doctors initially attributed to one of her medications, but it has continued and become worse. Additionally, the patient reports two days of constant, diffuse lower abdominal pain with associated watery diarrhea up to six episodes yesterday. No bleeding reported. Patient reports calling her transplant team today who recommended she come in to be seen today given her new onset of symptoms. Of note, she was on antibiotics while she was admitted for her transplant. The patient denies weight loss, urinary symptoms, fever, chills, shortness of breath, or chest pain. Patient denies other complaints or concerns at this time.    Per chart review, patient was seen at [facility] specialty clinic on  for follow-up of medication therapy management for liver transplant ().  During this visitation, patient's phosphorus levels were normal and patient is on a low dose of phosphorus. Physician recommended possibly discontinue phosphorus. Patient was directed to take Mirtazapine 15mg at bedtime for appetite starting on .  Patient reported having a low appetite and currently is on tube feedings. Patient  reported that she has been experiencing tremors. Patient is currently on the following immunosuppressants tacrolimus 1 mg twice daily, prednisone 5 mg daily starting on 12/02, and mycophenolate mofetil 750 mg twice daily. Additionally, patient with had a blood transfusion on 12/01 and her hemoglobin levels dropped to 6.8.    Past Medical History:   Diagnosis Date     Ascites      Biliary cirrhosis (H)      Cholangitis, sclerosing      Cirrhosis of liver with ascites (H) 3/3/2021     Hearing loss of left ear     wears a hearing aide     History of low potassium      Hyperlipidemia      Hypertension      SBP (spontaneous bacterial peritonitis) (H) 4/30/2021     Sjogren's syndrome (H)      Ulcerative colitis (H)      Ulcerative pancolitis (H)        Past Surgical History:   Procedure Laterality Date     BENCH LIVER N/A 11/18/2021    Procedure: Bench liver;  Surgeon: Ernesto Schmitt MD;  Location:  OR     CV CORONARY ANGIOGRAM N/A 8/25/2021    Procedure: Coronary Angiogram with possible intervention;  Surgeon: David Wilhelm MD;  Location:  HEART CARDIAC CATH LAB     ENDOSCOPIC RETROGRADE CHOLANGIOPANCREATOGRAM N/A 6/25/2021    Procedure: ENDOSCOPIC RETROGRADE CHOLANGIOPANCREATOGRAPHY with ballon sweep of bile ducts for stones, balloon dilation of bile ducts and bile duct stent placement;  Surgeon: Gregory Gabriel MD;  Location:  OR     ENDOSCOPIC RETROGRADE CHOLANGIOPANCREATOGRAM N/A 7/22/2021    Procedure: ENDOSCOPIC RETROGRADE CHOLANGIOPANCREATOGRAPHY STONE REMOVAL, DILATION AND STENT PLACEMENT;  Surgeon: Gregory Gabriel MD;  Location:  OR     ENDOSCOPIC RETROGRADE CHOLANGIOPANCREATOGRAPHY, EXCHANGE TUBE/STENT N/A 05/05/2021    Procedure: ENDOSCOPIC RETROGRADE CHOLANGIOPANCREATOGRAPHY WITH STENT EXCHANGE, STONE EXTRACTION, AND DILATION;  Surgeon: Gregory Gabriel MD;  Location:  OR     ENDOSCOPIC RETROGRADE CHOLANGIOPANCREATOGRAPHY, EXCHANGE TUBE/STENT N/A 5/10/2021    Procedure: ENDOSCOPIC  RETROGRADE CHOLANGIOPANCREATOGRAPHY with biliary dilation, stone removal, stent exchange;  Surgeon: Gregory Gabriel MD;  Location: [facility]     ENDOSCOPIC RETROGRADE CHOLANGIOPANCREATOGRAPHY, EXCHANGE TUBE/STENT N/A 6/10/2021    Procedure: ENDOSCOPIC RETROGRADE CHOLANGIOPANCREATOGRAPHY, WITH biliary stent exchange, stone removal;  Surgeon: Woodrow Lott MD;  Location: [facility] OR     ENDOSCOPIC RETROGRADE CHOLANGIOPANCREATOGRAPHY, EXCHANGE TUBE/STENT N/A 2021    Procedure: ENDOSCOPIC RETROGRADE CHOLANGIOPANCREATOGRAPHY with biliary dilation, stone removal, stent exchange;  Surgeon: Gregory Gabriel MD;  [facility]     ENDOSCOPIC RETROGRADE CHOLANGIOPANCREATOGRAPHY, EXCHANGE TUBE/STENT N/A 10/1/2021    Procedure: ENDOSCOPIC RETROGRADE CHOLANGIOPANCREATOGRAPHY with balloon sweep of bile ducts, bile duct stent exchanged;  Surgeon: Gregory Gabriel MD;  Location[facility]     ESOPHAGOSCOPY, GASTROSCOPY, DUODENOSCOPY (EGD), COMBINED N/A 2021    Procedure: ESOPHAGOGASTRODUODENOSCOPY (EGD);  Surgeon: Leventhal, Thomas Michael, MD;  Location: [facility] GI     ESOPHAGOSCOPY, GASTROSCOPY, DUODENOSCOPY (EGD), COMBINED N/A 10/1/2021    Procedure: ESOPHAGOGASTRODUODENOSCOPY (EGD) with varices banding;  Surgeon: Gregory Gabriel MD;  Location: [facility] OR     GYN SURGERY      bilat fallopian tubes and ovaries removed     PICC DOUBLE LUMEN PLACEMENT Right 2021    42cm (2cm external), Lateral brachial vein     RETURN LIVER TRANSPLANT N/A 2021    Procedure: CLOSURE, WOUND, ABDOMEN, SECONDARY, ABDOMINAL WASHOUT;  Surgeon: Ernesto Schmitt MD;  Location[facility]     TRANSPLANT LIVER RECIPIENT  DONOR N/A 2021    Procedure: Opening of abdomen, Abdominal Exploration and aborted liver transplant.;  Surgeon: Ernesto Schmitt MD;  [facility]     TRANSPLANT LIVER RECIPIENT  DONOR N/A 2021    Procedure: TRANSPLANT, LIVER, RECIPIENT,  DONOR;  Surgeon: Ernesto Schmitt MD;  Location: [facility] OR        Family History   Problem Relation Age of Onset     Cancer Mother         angiosarcoma     Coronary Artery Disease Early Onset Father         MI age 46     Heart Transplant Father      Liver Disease No family hx of      Ulcerative Colitis No family hx of      Crohn's Disease No family hx of        Social History     Tobacco Use     Smoking status: Never Smoker     Smokeless tobacco: Never Used   Substance Use Topics     Alcohol use: No     Comment: Last drink was 2017       No current facility-administered medications for this encounter.     Current Outpatient Medications   Medication     aspirin (ASA) 325 MG tablet     furosemide (LASIX) 20 MG tablet     Guar Gum (FIBER MODULAR, NUTRISOURCE FIBER,) packet     magnesium oxide (MAG-OX) 400 MG tablet     mirtazapine (REMERON) 15 MG tablet     multivitamin (CENTRUM SILVER) tablet     mycophenolate (GENERIC EQUIVALENT) 250 MG capsule     ondansetron (ZOFRAN-ODT) 4 MG ODT tab     oxyCODONE (ROXICODONE) 5 MG tablet     pantoprazole (PROTONIX) 40 MG EC tablet     phosphorus tablet 250 mg (PHOSPHA 250 NEUTRAL) 250 MG per tablet     polyethylene glycol (MIRALAX) 17 GM/Dose powder     predniSONE (DELTASONE) 5 MG tablet     protein modular (PROSOURCE TF) LIQD     senna-docusate (SENOKOT-S/PERICOLACE) 8.6-50 MG tablet     sulfamethoxazole-trimethoprim (BACTRIM) 400-80 MG tablet     tacrolimus (GENERIC EQUIVALENT) 0.5 MG capsule     tacrolimus (GENERIC EQUIVALENT) 1 MG capsule     ursodiol (ACTIGALL) 300 MG capsule     valGANciclovir (VALCYTE) 450 MG tablet        Allergies   Allergen Reactions     Diagnostic X-Ray Materials Hives     PATIENT HAD HIVE REACTION AFTER ADMINISTERING CT CONTRAST DYE.       Contrast Dye        I have reviewed the Medications, Allergies, Past Medical and Surgical History, and Social History in the Epic system.    Review of Systems   Constitutional: Positive for activity change, appetite change and fatigue. Negative for chills and fever.   HENT:  Negative for trouble swallowing (has [place] feeding tube).    Eyes: Negative for visual disturbance.   Respiratory: Negative for cough and shortness of breath.    Cardiovascular: Negative for chest pain.   Gastrointestinal: Positive for abdominal pain (lower bilateral ab pain) and diarrhea. Negative for nausea and vomiting.   Genitourinary: Negative for dysuria and flank pain.   Musculoskeletal: Negative for gait problem and joint swelling.   Skin: Positive for wound (surgical incision healing without complaints).   Allergic/Immunologic: Positive for immunocompromised state (liver tx).   Neurological: Positive for tremors (chronic), weakness (general) and light-headedness. Negative for syncope.   Hematological: Does not bruise/bleed easily.   Psychiatric/Behavioral: Negative for confusion, decreased concentration, dysphoric mood and hallucinations.   All other systems reviewed and are negative.    A complete review of systems was performed with pertinent positives and negatives noted in the HPI, and all other systems negative.    Physical Exam   BP: 122/71  Pulse: 86  Temp: 98.2  F (36.8  C)  Resp: 20  SpO2: 98 %      Physical Exam  Vitals and nursing note reviewed.   Constitutional:       General: She is in acute distress.      Appearance: She is well-developed. She is ill-appearing. She is not toxic-appearing or diaphoretic.      Comments: Patient mild ill, but appropriate nontoxic     HENT:      Head: Normocephalic and atraumatic.      Nose: Nose normal.      Comments: nj tube in place       Mouth/Throat:      Mouth: Mucous membranes are dry.   Eyes:      General: No scleral icterus.     Extraocular Movements: Extraocular movements intact.      Pupils: Pupils are equal, round, and reactive to light.   Cardiovascular:      Rate and Rhythm: Normal rate and regular rhythm.   Pulmonary:      Effort: No respiratory distress.      Breath sounds: No wheezing or rales.   Abdominal:      General: There is no distension.       Palpations: Abdomen is soft. There is no mass.      Tenderness: There is abdominal tenderness. There is no guarding or rebound.      Hernia: No hernia is present.      Comments: Incision healing without any sign of drainage   Musculoskeletal:         General: No swelling or tenderness.      Cervical back: Normal range of motion and neck supple. No rigidity.   Skin:     General: Skin is warm and dry.      Capillary Refill: Capillary refill takes less than 2 seconds.      Coloration: Skin is not jaundiced or pale.      Findings: No erythema or rash.   Neurological:      General: No focal deficit present.      Mental Status: She is alert and oriented to person, place, and time. Mental status is at baseline.   Psychiatric:      Comments: Patient appears appropriate here mildly flat affect not delusional not confused          EXAM:  Constitutional: Lying comfortably in bed.   HEENT: Normal. Atraumatic.   Neck: Supple, normal voice  Chest: No respiratory distress, speaks in complete sentences, chest wall nontender, lungs clear.  CV: Regular rate, no murmur, normal pulse, no jugular venous distention  Abdomen: Nondistended, soft. Inverted Y incision is clear, dry, and non erythematous. Tenderness on palpation of LLQ, LUQ and across lower abdomen.   Extremities: No edema or tenderness. Some swelling in bilateral feet. Bilateral distal pulses in tact.   Neuro: A&O x3, Cranial nerves grossly in tact.     ED Course     At 10:10 AM the patient was seen and examined by , MD in Room ED 22.   Patient valuated in the ER.  IV was established patient received a total of 2 L normal saline bolus in the ER.  Stool samples were collected results are pending at this point.  White count to be 12.7.  Hemoglobin 7.6 which is stable for patient platelets are 396.  Blood cultures were sent.  Lactic acid 0.6.  Liver function test within normal limits.  Glucose 97  Sodium 142 potassium 4.4 bicarb 23 gap is 6 urinalysis negative for any acute  infection otherwise.    Consultation with the transplant service I did see the patient ER.  With plan evaluation reviewing labs etc. feel patient with hydration appropriate go home the follow-up of the stool studies patient feels comfortable this plan otherwise feeling better with rehydration will return if any concerns at all.  Will follow up with transplant surgery and they will contact patient regarding stool studies if they are positive also.  She will return if any concerns at all.     Procedures                   Results for orders placed or performed during the hospital encounter of 12/03/21 (from the past 24 hour(s))   CBC with platelets differential    Narrative    The following orders were created for panel order CBC with platelets differential.  Procedure                               Abnormality         Status                     ---------                               -----------         ------                     CBC with platelets and d...[[id number]]  Abnormal            Final result                 Please view results for these tests on the individual orders.   INR   Result Value Ref Range    INR 1.13 0.85 - 1.15   Partial thromboplastin time   Result Value Ref Range    aPTT 36 22 - 38 Seconds   Comprehensive metabolic panel   Result Value Ref Range    Sodium 142 133 - 144 mmol/L    Potassium 4.4 3.4 - 5.3 mmol/L    Chloride 113 (H) 94 - 109 mmol/L    Carbon Dioxide (CO2) 23 20 - 32 mmol/L    Anion Gap 6 3 - 14 mmol/L    Urea Nitrogen 24 7 - 30 mg/dL    Creatinine 0.65 0.52 - 1.04 mg/dL    Calcium 8.4 (L) 8.5 - 10.1 mg/dL    Glucose 97 70 - 99 mg/dL    Alkaline Phosphatase 106 40 - 150 U/L    AST 17 0 - 45 U/L    ALT 17 0 - 50 U/L    Protein Total 5.5 (L) 6.8 - 8.8 g/dL    Albumin 1.8 (L) 3.4 - 5.0 g/dL    Bilirubin Total 0.5 0.2 - 1.3 mg/dL    GFR Estimate >90 >60 mL/min/1.73m2   Lipase   Result Value Ref Range    Lipase 155 73 - 393 U/L   Phosphorus   Result Value Ref Range    Phosphorus 3.5 2.5 -  4.5 mg/dL   Magnesium   Result Value Ref Range    Magnesium 1.7 1.6 - 2.3 mg/dL   Lactic acid whole blood   Result Value Ref Range    Lactic Acid 0.6 (L) 0.7 - 2.0 mmol/L   CBC with platelets and differential   Result Value Ref Range    WBC Count 12.7 (H) 4.0 - 11.0 10e3/uL    RBC Count 2.62 (L) 3.80 - 5.20 10e6/uL    Hemoglobin 7.6 (L) 11.7 - 15.7 g/dL    Hematocrit 23.7 (L) 35.0 - 47.0 %    MCV 91 78 - 100 fL    MCH 29.0 26.5 - 33.0 pg    MCHC 32.1 31.5 - 36.5 g/dL    RDW 15.9 (H) 10.0 - 15.0 %    Platelet Count 396 150 - 450 10e3/uL    % Neutrophils 91 %    % Lymphocytes 2 %    % Monocytes 4 %    % Eosinophils 1 %    % Basophils 1 %    % Immature Granulocytes 1 %    NRBCs per 100 WBC 0 <1 /100    Absolute Neutrophils 11.5 (H) 1.6 - 8.3 10e3/uL    Absolute Lymphocytes 0.3 (L) 0.8 - 5.3 10e3/uL    Absolute Monocytes 0.5 0.0 - 1.3 10e3/uL    Absolute Eosinophils 0.2 0.0 - 0.7 10e3/uL    Absolute Basophils 0.1 0.0 - 0.2 10e3/uL    Absolute Immature Granulocytes 0.1 <=0.4 10e3/uL    Absolute NRBCs 0.0 10e3/uL   [place] Draw    Narrative    The following orders were created for panel order [place] Draw.  Procedure                               Abnormality         Status                     ---------                               -----------         ------                     Extra Red Top Tube[[id number]]                               Final result                 Please view results for these tests on the individual orders.   Extra Red Top Tube   Result Value Ref Range    Hold Specimen JIC    UA with Microscopic reflex to Culture    Specimen: Urine, Midstream   Result Value Ref Range    Color Urine Yellow Colorless, Straw, Light Yellow, Yellow    Appearance Urine Clear Clear    Glucose Urine Negative Negative mg/dL    Bilirubin Urine Negative Negative    Ketones Urine Negative Negative mg/dL    Specific Gravity Urine 1.020 1.003 - 1.035    Blood Urine Negative Negative    pH Urine 6.0 5.0 - 7.0    Protein Albumin Urine  10  (A) Negative mg/dL    Urobilinogen Urine Normal Normal, 2.0 mg/dL    Nitrite Urine Negative Negative    Leukocyte Esterase Urine Negative Negative    Mucus Urine Present (A) None Seen /LPF    RBC Urine 1 <=2 /HPF    WBC Urine 1 <=5 /HPF    Squamous Epithelials Urine <1 <=1 /HPF    Hyaline Casts Urine 3 (H) <=2 /LPF    Narrative    Urine Culture not indicated   Clostridium difficile toxin B PCR    Specimen: Per Rectum; Stool   Result Value Ref Range    C Difficile Toxin B by PCR Negative Negative    Narrative    The [employer] Xpert C. difficile Assay, performed on the [employer], is a qualitative in vitro diagnostic test for rapid detection of toxin B gene sequences from unformed (liquid or soft) stool specimens collected from patients suspected of having Clostridioides difficile infection (CDI). The test utilizes automated real-time polymerase chain reaction (PCR) to detect toxin gene sequences associated with toxin producing C. difficile. The Xpert C. difficile Assay is intended as an aid in the diagnosis of CDI.     Medications   0.9% sodium chloride BOLUS (0 mLs Intravenous Stopped 12/3/21 1259)   0.9% sodium chloride BOLUS (0 mLs Intravenous Stopped 12/3/21 1539)             Assessments & Plan (with Medical Decision Making)   Berta is a 56 year old female with a past medical history significant for chronic liver failure (liver transplant aborted in 2021 d/t V tach. S/p  donor liver transplant with biliary stent on 21), cholangitis, primary sclerosing cholangitis, ulcerative pancolitis, cirrhosis of the liver, hypertension, leukocytosis, and sepsis who presents to the ED for evaluation of 15 days of tremors/shaking, and two days of abdominal pain and watery diarrhea. Initial Differential diagnosis includes transplant rejection, small bowel obstruction, post-operative ileus, pancreatitis, infectious colitis, small bowel obstruction, gastroenteritis. Additional  differential diagnosis includes infectious diarrhea due to viral cause (such as norovirus rotavirus, adenovirus, etc.), infectious diarrhea due to bacterial cause (E. coli, Shigella, Campylobacter, Salmonella, C. Difficile), protozoal disease such as Giardia or other, inflammatory bowel disease, noninfectious toxin mediated illness, irritable bowel syndrome, less likely intestinal ischemia, appendicitis, diverticulitis. While in the ED patient received IV hydration with improvement of her symptoms on re-evaluation. Workup while in the ED included blood cultures, CBC, C.diff, CMP, enteric bacteria, INR, lactic acid, Lipase, magnesium, PTT, phos and a UA. Lab work was grossly unremarkable. Some labs sent and still pending, will follow up with these and treat as appropriate. Transplant team was consulted, saw the patient and recommended collecting stool samples and then agreed patient can be managed outpatient as her lab work was unremarkable and she is vitally stable and is symptomatically improved. Acute and life threatening pathologies ruled out in the ED.The patient appears stable at this time to be treated symptomatically, and to follow-up as an outpatient for any further evaluation and treatment. Will follow up with lab results and treat as appropriate. Discussed expected course, need for follow up, and indications for return with the patient. See discharge instructions. Return to the ED with any new or worsening symptoms.     I have reviewed the nursing notes.    I have reviewed the findings, diagnosis, plan and need for follow up with the patient. Patient is in agreement with this plan and expressed understanding.     --    ED Attending Physician Attestation    I Jagdish Daley MD, cared for this patient with the Medical Student. I performed, or re-performed, the physical exam and medical decision-making. I have verified the accuracy of all the medical student findings and documentation above, and have  edited as necessary.    Summary of HPI, PE, ED Course   Patient is a 56 year old female evaluated in the emergency department for diarrhea and ab pain and dehydration. Exam notable for ab soft with incision healing. ED course notable for dehydration with IV fluids and eval by tx team in ED. After the completion of care in the emergency department, the patient was discharged.    Critical Care & Procedures  Not applicable.    Medical Decision Making  The medical record was reviewed and interpreted.  Current labs reviewed and interpreted.  Previous labs reviewed and interpreted.      Jagdish Daley MD  Emergency Medicine         Discharge Medication List as of 12/3/2021  3:43 PM          Final diagnoses:   Diarrhea, unspecified type   Dehydration   S/P liver transplant (H)   Abdominal pain, generalized     Asiya Jae, MS4     Tessa CH am serving as a trained medical scribe to document services personally performed by , MD, based on the provider's statements to me.      CH MD, was physically present and have reviewed and verified the accuracy of this note documented by Tessa Curtis.     Jagdish Daley MD  12/3/2021   [facility] EMERGENCY DEPARTMENT    This note was created at least in part by the use of dragon voice dictation system. Inadvertent typographical errors may still exist.  Jagdish Daley MD.    Patient evaluated in the emergency department during the COVID-19 pandemic period. Careful attention to patients safety was addressed throughout the evaluation. Evaluation and treatment management was initiated with disposition made efficiently and appropriate as possible to minimize any risk of potential exposure to patient during this evaluation.       Jagdish Daley MD  12/03/21 9077

## 2021-12-03 NOTE — TELEPHONE ENCOUNTER
"Tac level 6.6  Per Karin her mom is \"shaking all over\".  She also started having liquid stools last night - had 6 (no miralax or senna yesterday).  Her voice is tremulous.  Spoke to Dr. Schmitt,  He agreed that Berta should come to our ER.  I spoke to Karin about this, they will come in.  Called ER to gave them an update.   "

## 2021-12-03 NOTE — CONSULTS
Transplant Surgery  Consult Note  2021    Assessment & Plan: Berta Nava is a 56 year old female with PMH significant for PSC/UC, Sjogren's syndrome, HTN, liver transplant aborted in 2021 d/t V tach. S/p  donor liver transplant with biliary stent on 21, complicated by coagulopathy and finding of intra-abdominal abscess. Return to OR 21 for washout and abdominal closure. Presented to ED with abdominal pain and new diarrhea x 2 days.       Liver transplant 21: LFTs WNL.      Immunosuppression: Maintenance immunosupression with mycophenolate and tacrolimus. Tacrolimus level goal 8-10 (12 hour trough). Last level  6.6. Infectious prophylaxis with bactrim (x 6 months) and valganciclovir (x 12 weeks).       Transplant coordinator: Vanessa Durant [phone].  Biliary stent: yes  Donor status: DBD  CMV D + / R -  EBV D + / R +  Anticoagulation plan:      Severe malnutrition in the context of acute on chronic illness: Regular diet as tolerated. NJ in place, continue tube feeds.       Intra abdominal abscess: Noted in OR on , culture Staph epi. Completed Vanco x 7 days and Doxycycline 100 mg BID x 7 days.     Leukocytosis: WBC 12.7, has been between 10-17 over the last week. Will check for C diff, stool studies and UA/UC.      Diarrhea: Diarrhea x6 overnight. Hold bowel regimen. Continue tube feeds. Check C diff and stool studies. If negative, consider CMV PCR.     Disposition: ED, discharge home with family assist and close follow up.     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    2021 (Liver), Postoperative day: 15         ROS:   A 10-point review of systems was negative except as noted above.    Curent Meds:    sodium chloride 0.9%  1,000 mL  Intravenous Once     sodium chloride (PF)  3 mL Intracatheter Q8H       Physical Exam:     Admit      Current Vitals:   /72   Pulse 80   Temp 98.2  F (36.8  C) (Oral)   Resp 20   LMP  (LMP Unknown)   SpO2 100%          Vital sign ranges:    Temp:  [98.2  F (36.8  C)] 98.2  F (36.8  C)  Pulse:  [80-86] 80  Resp:  [20] 20  BP: (117-122)/(71-72) 117/72  SpO2:  [98 %-100 %] 100 %  Patient Vitals for the past 24 hrs:   BP Temp Temp src Pulse Resp SpO2   12/03/21 1130 117/72 -- -- 80 -- 100 %   12/03/21 1046 122/71 98.2  F (36.8  C) Oral 86 20 98 %     General Appearance: in no apparent distress.   Skin: normal, No rashes, induration, or jaundice  Heart: regular rate and rhythm  Lungs: NLB on room air   Abdomen: rounded, and  mildly tender, generalized. The wound is Healing well, well approximated and without signs of infection.  : Voiding   Extremities: edema: absent.   Neurologic: awake, alert and oriented. Tremor present. Asterixis: absent.    Frailty Scores     Frailty Scores 3/23/2021 3/13/2021    Final Score Not Frail Not Frail    Final Score Number 0 0          Data:   CMP  Recent Labs   Lab 12/03/21  1145 12/02/21  0821    140   POTASSIUM 4.4 4.7   CHLORIDE 113* 111*   CO2 23 24   GLC 97 114*   BUN 24 28   CR 0.65 0.78   GFRESTIMATED >90 84   MARIELENA 8.4* 8.8   MAG 1.7 1.7   PHOS 3.5 3.0   LIPASE 155  --    ALBUMIN 1.8* 2.0*   BILITOTAL 0.5 0.7   ALKPHOS 106 131   AST 17 13   ALT 17 20     CBC  Recent Labs   Lab 12/03/21  1145 12/02/21  0821   HGB 7.6* 9.2*   WBC 12.7* 10.6    497*     COAGS  Recent Labs   Lab 12/03/21  1145   INR 1.13   PTT 36      Urinalysis  Recent Labs   Lab Test 11/24/21 2014 11/20/21  1030 11/06/21  0509 06/10/21  0632 05/21/21  0730   COLOR Light Yellow Yellow Dark Yellow*   < > Dark Yellow   APPEARANCE Clear Clear Clear   < > Clear   URINEGLC Negative Negative Negative   < > Negative   URINEBILI Negative Negative Moderate*   < > Negative   URINEKETONE Negative  Negative Negative   < > Negative   SG 1.014 1.020 1.018   < > 1.031   UBLD Negative Small* Negative   < > Negative   URINEPH 5.0 5.0 5.5   < > 6.0   PROTEIN Negative 30 * Negative   < > 20*   NITRITE Negative Negative Negative   < > Negative   LEUKEST Negative Negative Negative   < > Negative   RBCU  --  7* <1   < > 1   WBCU  --  3 1   < > 4   UTPG  --   --   --   --  0.26*    < > = values in this interval not displayed.     Virology:   Hepatitis C Antibody   Date Value Ref Range Status   11/17/2021 Nonreactive Nonreactive Final

## 2021-12-03 NOTE — DISCHARGE INSTRUCTIONS
You were seen in the ED today for abdominal pain, diarrhea, and shaking.   Home with encouraging fluids to stay hydrated.  Transplant team saw you in the ER also with these recommendations home and they will contact you with results.    Lab workup was overall normal and reassuring. Stool lab work was sent and is pending. We will follow up with these results and treat as appropriate at that time.     Continue follow up with transplant team. Always welcome to return to the ED with any new or worsening symptoms.

## 2021-12-04 ENCOUNTER — HOME INFUSION (PRE-WILLOW HOME INFUSION) (OUTPATIENT)
Dept: PHARMACY | Facility: CLINIC | Age: 56
End: 2021-12-04
Payer: COMMERCIAL

## 2021-12-06 ENCOUNTER — DOCUMENTATION ONLY (OUTPATIENT)
Dept: TRANSPLANT | Facility: CLINIC | Age: 56
End: 2021-12-06

## 2021-12-06 ENCOUNTER — LAB (OUTPATIENT)
Dept: LAB | Facility: CLINIC | Age: 56
End: 2021-12-06
Attending: TRANSPLANT SURGERY
Payer: COMMERCIAL

## 2021-12-06 ENCOUNTER — OFFICE VISIT (OUTPATIENT)
Dept: TRANSPLANT | Facility: CLINIC | Age: 56
End: 2021-12-06
Attending: TRANSPLANT SURGERY
Payer: COMMERCIAL

## 2021-12-06 ENCOUNTER — HOME INFUSION (PRE-WILLOW HOME INFUSION) (OUTPATIENT)
Dept: PHARMACY | Facility: CLINIC | Age: 56
End: 2021-12-06
Payer: COMMERCIAL

## 2021-12-06 VITALS
HEART RATE: 81 BPM | OXYGEN SATURATION: 100 % | BODY MASS INDEX: 21.62 KG/M2 | WEIGHT: 129.9 LBS | DIASTOLIC BLOOD PRESSURE: 69 MMHG | SYSTOLIC BLOOD PRESSURE: 126 MMHG

## 2021-12-06 DIAGNOSIS — K74.60 CIRRHOSIS OF LIVER WITH ASCITES, UNSPECIFIED HEPATIC CIRRHOSIS TYPE (H): ICD-10-CM

## 2021-12-06 DIAGNOSIS — R18.8 CIRRHOSIS OF LIVER WITH ASCITES, UNSPECIFIED HEPATIC CIRRHOSIS TYPE (H): ICD-10-CM

## 2021-12-06 DIAGNOSIS — Z94.4 LIVER REPLACED BY TRANSPLANT (H): ICD-10-CM

## 2021-12-06 DIAGNOSIS — Z94.4 STATUS POST LIVER TRANSPLANT (H): ICD-10-CM

## 2021-12-06 LAB
ALBUMIN SERPL-MCNC: 2.1 G/DL (ref 3.4–5)
ALP SERPL-CCNC: 101 U/L (ref 40–150)
ALT SERPL W P-5'-P-CCNC: 13 U/L (ref 0–50)
AST SERPL W P-5'-P-CCNC: 7 U/L (ref 0–45)
BILIRUB DIRECT SERPL-MCNC: 0.4 MG/DL (ref 0–0.2)
BILIRUB SERPL-MCNC: 0.6 MG/DL (ref 0.2–1.3)
MAGNESIUM SERPL-MCNC: 1.9 MG/DL (ref 1.6–2.3)
PHOSPHATE SERPL-MCNC: 3.3 MG/DL (ref 2.5–4.5)
PROT SERPL-MCNC: 5.7 G/DL (ref 6.8–8.8)
TACROLIMUS BLD-MCNC: <3 UG/L (ref 5–15)
TME LAST DOSE: ABNORMAL H
TME LAST DOSE: ABNORMAL H

## 2021-12-06 PROCEDURE — 84100 ASSAY OF PHOSPHORUS: CPT | Performed by: PATHOLOGY

## 2021-12-06 PROCEDURE — 82248 BILIRUBIN DIRECT: CPT | Performed by: PATHOLOGY

## 2021-12-06 PROCEDURE — 36415 COLL VENOUS BLD VENIPUNCTURE: CPT | Performed by: PATHOLOGY

## 2021-12-06 PROCEDURE — 80197 ASSAY OF TACROLIMUS: CPT | Performed by: TRANSPLANT SURGERY

## 2021-12-06 PROCEDURE — 87521 HEPATITIS C PROBE&RVRS TRNSC: CPT | Performed by: TRANSPLANT SURGERY

## 2021-12-06 PROCEDURE — 83735 ASSAY OF MAGNESIUM: CPT | Performed by: PATHOLOGY

## 2021-12-06 PROCEDURE — 99214 OFFICE O/P EST MOD 30 MIN: CPT | Mod: 24 | Performed by: TRANSPLANT SURGERY

## 2021-12-06 PROCEDURE — 80053 COMPREHEN METABOLIC PANEL: CPT | Performed by: PATHOLOGY

## 2021-12-06 ASSESSMENT — PAIN SCALES - GENERAL: PAINLEVEL: MILD PAIN (2)

## 2021-12-06 NOTE — PROGRESS NOTES
Tube feeds changed to cycled last week. Spoke with Dr Schmitt who saw pt today. We will continue feeds for now and reassess in 1 month.

## 2021-12-06 NOTE — PROGRESS NOTES
Transplant Surgery -OUTPATIENT IMMUNOSUPPRESSION PROGRESS NOTE    Date of Visit: 12/06/2021    Transplants:  11/18/2021 (Liver); Postoperative day:  18  ASSESMENT AND PLAN:  1.Graft Function:Liver allograft: no rejection or technical problems.    2.Immunosuppression Management: keep tacrolimus levels at 5 ng/ml  3.Hypertension:ok  4.Renal Function: better  5.Lab frequency: weekly  6.Other:  Wound healthy  Malnutrition: recommend tube feeds  Deconditioning: recommend outpatient physical therapy  Stop lasix and phosphorous  Date: December 6, 2021    Transplant:  [x]                             Liver [x]                              Kidney []                             Pancreas []                              Other:             Chief Complaint:  Has shakiness    History of Present Illness:  Patient Active Problem List   Diagnosis     Cholangitis     Hypokalemia     Chronic fatigue     Essential hypertension     Hyperlipidemia     Mixed hearing loss, bilateral     Neoplasm of uncertain behavior of skin     PSC (primary sclerosing cholangitis)     Sicca syndrome (H)     Ulcerative pancolitis with complication (H)     Unsatisfactory cervical Papanicolaou smear     Cirrhosis of liver with ascites (H)     Sepsis (H)     SBP (spontaneous bacterial peritonitis) (H)     Choledocholithiasis     Encounter for replacement of biliary stent     Secondary esophageal varices without bleeding (H)     Primary sclerosing cholangitis     Liver transplant planned     Liver transplant candidate     Acute kidney failure, unspecified (H)     Severe protein-calorie malnutrition (H)     Encephalopathy     Hyperammonemia (H)     Leukocytosis, unspecified type     Hepatic encephalopathy (H)     Chronic liver failure without hepatic coma (H)     Immunosuppressed status (H)     S/P liver transplant (H)     Anemia due to chronic kidney disease     SOCIAL /FAMILY HISTORY: [x]                  No recent change    Past Medical History:   Diagnosis  Date     Ascites      Biliary cirrhosis (H)      Cholangitis, sclerosing      Cirrhosis of liver with ascites (H) 3/3/2021     Hearing loss of left ear     wears a hearing aide     History of low potassium      Hyperlipidemia      Hypertension      SBP (spontaneous bacterial peritonitis) (H) 4/30/2021     Sjogren's syndrome (H)      Ulcerative colitis (H)      Ulcerative pancolitis (H)      Past Surgical History:   Procedure Laterality Date     BENCH LIVER N/A 11/18/2021    Procedure: Bench liver;  Surgeon: Ernesto Schmitt MD;  Location: [facility]     CV CORONARY ANGIOGRAM N/A 8/25/2021    Procedure: Coronary Angiogram with possible intervention;  Surgeon: David Wilhelm MD;  [facility]: [facility]     ENDOSCOPIC RETROGRADE CHOLANGIOPANCREATOGRAM N/A 6/25/2021    Procedure: ENDOSCOPIC RETROGRADE CHOLANGIOPANCREATOGRAPHY with ballon sweep of bile ducts for stones, balloon dilation of bile ducts and bile duct stent placement;  Surgeon: Gregory Gabriel MD;  Location: [facility]     ENDOSCOPIC RETROGRADE CHOLANGIOPANCREATOGRAM N/A 7/22/2021    Procedure: ENDOSCOPIC RETROGRADE CHOLANGIOPANCREATOGRAPHY STONE REMOVAL, DILATION AND STENT PLACEMENT;  Surgeon: Gregory Gabriel MD;  Location: [facility]     ENDOSCOPIC RETROGRADE CHOLANGIOPANCREATOGRAPHY, EXCHANGE TUBE/STENT N/A 05/05/2021    Procedure: ENDOSCOPIC RETROGRADE CHOLANGIOPANCREATOGRAPHY WITH STENT EXCHANGE, STONE EXTRACTION, AND DILATION;  Surgeon: Gregory Gabriel MD;  [facility]     ENDOSCOPIC RETROGRADE CHOLANGIOPANCREATOGRAPHY, EXCHANGE TUBE/STENT N/A 5/10/2021    Procedure: ENDOSCOPIC RETROGRADE CHOLANGIOPANCREATOGRAPHY with biliary dilation, stone removal, stent exchange;  Surgeon: Gregory Gabriel MD;  [facility]     ENDOSCOPIC RETROGRADE CHOLANGIOPANCREATOGRAPHY, EXCHANGE TUBE/STENT N/A 6/10/2021    Procedure: ENDOSCOPIC RETROGRADE CHOLANGIOPANCREATOGRAPHY, WITH biliary stent exchange, stone removal;  Surgeon: Woodrow Lott MD;   Location[facility]     ENDOSCOPIC RETROGRADE CHOLANGIOPANCREATOGRAPHY, EXCHANGE TUBE/STENT N/A 2021    Procedure: ENDOSCOPIC RETROGRADE CHOLANGIOPANCREATOGRAPHY with biliary dilation, stone removal, stent exchange;  Surgeon: Gregory Gabriel MD;  [facility]     ENDOSCOPIC RETROGRADE CHOLANGIOPANCREATOGRAPHY, EXCHANGE TUBE/STENT N/A 10/1/2021    Procedure: ENDOSCOPIC RETROGRADE CHOLANGIOPANCREATOGRAPHY with balloon sweep of bile ducts, bile duct stent exchanged;  Surgeon: Gregory Gabriel MD;  Location[facility]     ESOPHAGOSCOPY, GASTROSCOPY, DUODENOSCOPY (EGD), COMBINED N/A 2021    Procedure: ESOPHAGOGASTRODUODENOSCOPY (EGD);  Surgeon: Leventhal, Thomas Michael, MD;  Location: [facility]     ESOPHAGOSCOPY, GASTROSCOPY, DUODENOSCOPY (EGD), COMBINED N/A 10/1/2021    Procedure: ESOPHAGOGASTRODUODENOSCOPY (EGD) with varices banding;  Surgeon: Gregory Gabriel MD;  Location[facility]     GYN SURGERY      bilat fallopian tubes and ovaries removed     PICC DOUBLE LUMEN PLACEMENT Right 2021    42cm (2cm external), Lateral brachial vein     RETURN LIVER TRANSPLANT N/A 2021    Procedure: CLOSURE, WOUND, ABDOMEN, SECONDARY, ABDOMINAL WASHOUT;  Surgeon: Ernesto Schmitt MD;  Location: [facility]     TRANSPLANT LIVER RECIPIENT  DONOR N/A 2021    Procedure: Opening of abdomen, Abdominal Exploration and aborted liver transplant.;  Surgeon: Ernesto Schmitt MD;  Location: [facility]     TRANSPLANT LIVER RECIPIENT  DONOR N/A 2021    Procedure: TRANSPLANT, LIVER, RECIPIENT,  DONOR;  Surgeon: Ernesto Schmitt MD;  Location: [facility]     Social History     Socioeconomic History     Marital status:      Spouse name: Not on file     Number of children: Not on file     Years of education: Not on file     Highest education level: Not on file   Occupational History     Not on file   Tobacco Use     Smoking status: Never Smoker     Smokeless tobacco: Never Used   Vaping Use     Vaping Use:  Never used   Substance and Sexual Activity     Alcohol use: No     Comment: Last drink was 2017     Drug use: No     Sexual activity: Not Currently     Partners: Male   Other Topics Concern     Parent/sibling w/ CABG, MI or angioplasty before 65F 55M? Not Asked   Social History Narrative     Not on file     Social Determinants of Health     Financial Resource Strain: Not on file   Food Insecurity: Not on file   Transportation Needs: Not on file   Physical Activity: Not on file   Stress: Not on file   Social Connections: Not on file   Intimate Partner Violence: Not on file   Housing Stability: Not on file     Prescription Medications as of 2021       Rx Number Disp Refills Start End Last Dispensed Date Next Fill Date Owning Pharmacy    aspirin (ASA) 325 MG tablet  90 tablet 3 2021    [facility] [address] [place] [address] [address] [place] [address]    Si tablet (325 mg) by Oral or Feeding Tube route daily    Class: E-Prescribe    Route: Oral or Feeding Tube    furosemide (LASIX) 20 MG tablet  90 tablet 1 2021    [facility] [address] [place] [address] [address] [place] [address]    Sig: Take 1 tablet (20 mg) by mouth daily    Class: E-Prescribe    Route: Oral    Guar Gum (FIBER MODULAR, NUTRISOURCE FIBER,) packet  75 packet 0 2021    [facility] [place], [place] - [address] [place] [address]    Si packet by Per Feeding Tube route 3 times daily    Class: No Print Out    Route: Per Feeding Tube    magnesium oxide (MAG-OX) 400 MG tablet  180 tablet 3 2021    [facility] [address] [place] [address] [address] [place] [address]    Sig: Take 1 tablet (400 mg) by mouth 2 times daily    Class: E-Prescribe    Route: Oral    mirtazapine (REMERON) 15 MG tablet  30 tablet 0 2021    [facility] [address] [place] [address] [address] [place] [address]    Sig: Take  1 tablet (15 mg) by mouth At Bedtime    Class: E-Prescribe    Route: Oral    multivitamin (CENTRUM SILVER) tablet            Sig: Take 1 tablet by mouth daily    Class: Historical    Route: Oral    mycophenolate (GENERIC EQUIVALENT) 250 MG capsule  540 capsule 3 2021    [facility] [id number] [place] [address] [address] [place] [address]    Sig: Take 3 capsules (750 mg) by mouth 2 times daily    Class: E-Prescribe    Notes to Pharmacy: TXP DT 2021 (Liver) TXP Dischg DT 2021 DX Liver replaced by transplant Z94.4 [facility] ([place], [place])    Route: Oral    pantoprazole (PROTONIX) 40 MG EC tablet  180 tablet 3 2021    [facility] [address] [place] [address] [address] [place] [address]    Sig: Take 1 tablet (40 mg) by mouth 2 times daily    Class: E-Prescribe    Route: Oral    phosphorus tablet 250 mg (PHOSPHA 250 NEUTRAL) 250 MG per tablet  90 tablet 3 2021    [facility] [address] [place] [address] [address] [place] [address]    Sig: Take 1 tablet (250 mg) by mouth daily    Class: E-Prescribe    Route: Oral    predniSONE (DELTASONE) 5 MG tablet  90 tablet 3 2021    [facility] [address] [place] [address] [address] [place] [address]    Sig: Take 1 tablet (5 mg) by mouth daily    Class: E-Prescribe    Notes to Pharmacy: TXP DT 2021 (Liver) TXP Dischg DT 2021 DX Liver replaced by transplant Z94.4 [facility] ([place], [place])    Route: Oral    protein modular (PROSOURCE TF) LIQD  30 packet 0 2021    [facility] - [place], [place] - [address] [facility]    Si packet by Per Feeding Tube route daily    Class: E-Prescribe    Route: Per Feeding Tube    sulfamethoxazole-trimethoprim (BACTRIM) 400-80 MG tablet  90 tablet 3 2021    [id number] [address] [place] [address]    Sig: Take 1 tablet by mouth daily    Class: E-Prescribe    Route: Oral    tacrolimus (GENERIC EQUIVALENT) 1 MG capsule  180 capsule 3 2021    [facility] [address] [place] [address] [address] [place] [address]    Sig: Take 1 capsule (1 mg) by mouth 2 times daily    Class: E-Prescribe    Notes to Pharmacy: TXP DT 2021 (Liver) TXP Dischg DT 2021 DX Liver replaced by transplant Z94.4 [facility] ([place], [place])    Route: Oral    ursodiol (ACTIGALL) 300 MG capsule  180 capsule 3 2021    [facility] [address] [place] [address] [address] [place] [address]    Sig: Take 1 capsule (300 mg) by mouth 2 times daily    Class: E-Prescribe    Route: Oral    valGANciclovir (VALCYTE) 450 MG tablet  90 tablet 3 2021    [facility] #[id number] [place] [address] [address] [place] [address]    Sig: Take 1 tablet (450 mg) by mouth daily    Class: E-Prescribe    Route: Oral    ondansetron (ZOFRAN-ODT) 4 MG ODT tab  20 tablet 1 2021    [facility] [address] [place] [address] [address] [place] [address]    Sig: Take 1 tablet (4 mg) by mouth every 8 hours as needed for nausea    Class: E-Prescribe    Route: Oral    oxyCODONE (ROXICODONE) 5 MG tablet  15 tablet 0 2021    [address]    Si tablet (5 mg) by Oral or Feeding Tube route every 4 hours as needed for moderate to severe pain    Class: E-Prescribe    Earliest Fill Date: 2021    Route: Oral or Feeding Tube    polyethylene glycol (MIRALAX) 17 GM/Dose powder  510 g 0 2021    [address]    Sig: Take 17 g by mouth daily HOLD for loose stools    Class: E-Prescribe     Route: Oral    senna-docusate (SENOKOT-S/PERICOLACE) 8.6-50 MG tablet  60 tablet 0 11/27/2021    [address]    Sig: Take 1 tablet by mouth 2 times daily HOLD for loose stools    Class: E-Prescribe    Route: Oral    tacrolimus (GENERIC EQUIVALENT) 0.5 MG capsule  60 capsule 1 11/27/2021    [address]    Sig: Take 1 capsule by mouth twice daily as needed for dose titration    Class: E-Prescribe        Diagnostic x-ray materials and Contrast dye   REVIEW OF SYSTEMS (check box if normal)  [x]               GENERAL  [x]                 PULMONARY [x]                GENITOURINARY  [x]                CNS                 [x]                 CARDIAC  [x]                 ENDOCRINE  [x]                EARS,NOSE,THROAT [x]                 GASTROINTESTINAL [x]                 NEUROLOGIC    [x]                MUSCLOSKELTAL  [x]                  HEMATOLOGY      PHYSICAL EXAM (check box if normal)/69   Pulse 81   Wt 58.9 kg (129 lb 14.4 oz)   LMP  (LMP Unknown)   SpO2 100%   Breastfeeding No   BMI 21.62 kg/m          [x]            GENERAL:    [x]       EYES:  ICTERIC   []        YES  []                    NO  [x]           EXTREMITIES: Clubbing []       Y     [x]           N    [x]           EARS, NOSE, THROAT: Membranes Moist    YES   [x]                   NO []                  [x]           LUNGS:  CLEAR    YES       [x]                  NO    []                                [x]           SKIN: Jaundice           YES       []                  NO    [x]                   Rash: YES       []                  NO    [x]                                     [x]             HEART: Regular Rate          YES       [x]                  NO    []                   Incision Clean:  YES       [x]                  NO    []                                [x]                    ABDOMEN: Wound healing well  Organomegaly YES       []                  NO    [x]                       [x]                    NEUROLOGICAL:  Nonfocal  YES       [x]                  NO    []                       [x]                    Hernia YES       []                  NO    [x]                   PSYCHIATRIC:  Appropriate  YES       [x]                  NO    []                       OTHER:                                                                                                   PAIN SCALE:: 3

## 2021-12-06 NOTE — LETTER
12/6/2021     RE: Berta Nava  [address] [facility] [id number]    Dear Colleague,    Thank you for referring your patient, Berta Nava, to the [facility]. Please see a copy of my visit note below.    Transplant Surgery -OUTPATIENT IMMUNOSUPPRESSION PROGRESS NOTE    Date of Visit: 12/06/2021    Transplants:  11/18/2021 (Liver); Postoperative day:  18  ASSESMENT AND PLAN:  1.Graft Function:Liver allograft: no rejection or technical problems.    2.Immunosuppression Management: keep tacrolimus levels at 5 ng/ml  3.Hypertension:ok  4.Renal Function: better  5.Lab frequency: weekly  6.Other:  Wound healthy  Malnutrition: recommend tube feeds  Deconditioning: recommend outpatient physical therapy  Stop lasix and phosphorous  Date: December 6, 2021    Transplant:  [x]                             Liver [x]                              Kidney []                             Pancreas []                              Other:             Chief Complaint:  Has shakiness    History of Present Illness:  Patient Active Problem List   Diagnosis     Cholangitis     Hypokalemia     Chronic fatigue     Essential hypertension     Hyperlipidemia     Mixed hearing loss, bilateral     Neoplasm of uncertain behavior of skin     PSC (primary sclerosing cholangitis)     Sicca syndrome (H)     Ulcerative pancolitis with complication (H)     Unsatisfactory cervical Papanicolaou smear     Cirrhosis of liver with ascites (H)     Sepsis (H)     SBP (spontaneous bacterial peritonitis) (H)     Choledocholithiasis     Encounter for replacement of biliary stent     Secondary esophageal varices without bleeding (H)     Primary sclerosing cholangitis     Liver transplant planned     Liver transplant candidate     Acute kidney failure, unspecified (H)     Severe protein-calorie malnutrition (H)     Encephalopathy     Hyperammonemia (H)     Leukocytosis, unspecified type     Hepatic encephalopathy (H)     Chronic  liver failure without hepatic coma (H)     Immunosuppressed status (H)     S/P liver transplant (H)     Anemia due to chronic kidney disease     SOCIAL /FAMILY HISTORY: [x]                  No recent change    Past Medical History:   Diagnosis Date     Ascites      Biliary cirrhosis (H)      Cholangitis, sclerosing      Cirrhosis of liver with ascites (H) 3/3/2021     Hearing loss of left ear     wears a hearing aide     History of low potassium      Hyperlipidemia      Hypertension      SBP (spontaneous bacterial peritonitis) (H) 4/30/2021     Sjogren's syndrome (H)      Ulcerative colitis (H)      Ulcerative pancolitis (H)      Past Surgical History:   Procedure Laterality Date     BENCH LIVER N/A 11/18/2021    Procedure: Bench liver;  Surgeon: Ernesto Schmitt MD;  Location: [facility] OR     CV CORONARY ANGIOGRAM N/A 8/25/2021    Procedure: Coronary Angiogram with possible intervention;  Surgeon: David Wilhelm MD;  Location:  HEART CARDIAC CATH LAB     ENDOSCOPIC RETROGRADE CHOLANGIOPANCREATOGRAM N/A 6/25/2021    Procedure: ENDOSCOPIC RETROGRADE CHOLANGIOPANCREATOGRAPHY with ballon sweep of bile ducts for stones, balloon dilation of bile ducts and bile duct stent placement;  Surgeon: Gregory Gabriel MD;  Location: [facility]     ENDOSCOPIC RETROGRADE CHOLANGIOPANCREATOGRAM N/A 7/22/2021    Procedure: ENDOSCOPIC RETROGRADE CHOLANGIOPANCREATOGRAPHY STONE REMOVAL, DILATION AND STENT PLACEMENT;  Surgeon: Gregory Gabriel MD;  Location:  OR     ENDOSCOPIC RETROGRADE CHOLANGIOPANCREATOGRAPHY, EXCHANGE TUBE/STENT N/A 05/05/2021    Procedure: ENDOSCOPIC RETROGRADE CHOLANGIOPANCREATOGRAPHY WITH STENT EXCHANGE, STONE EXTRACTION, AND DILATION;  Surgeon: Gregory Gabriel MD;  [facility]     ENDOSCOPIC RETROGRADE CHOLANGIOPANCREATOGRAPHY, EXCHANGE TUBE/STENT N/A 5/10/2021    Procedure: ENDOSCOPIC RETROGRADE CHOLANGIOPANCREATOGRAPHY with biliary dilation, stone removal, stent exchange;  Surgeon: Gregory Gabriel MD;   Location[facility]     ENDOSCOPIC RETROGRADE CHOLANGIOPANCREATOGRAPHY, EXCHANGE TUBE/STENT N/A 6/10/2021    Procedure: ENDOSCOPIC RETROGRADE CHOLANGIOPANCREATOGRAPHY, WITH biliary stent exchange, stone removal;  Surgeon: Woodrow Lott MD;  Location: [facility]     ENDOSCOPIC RETROGRADE CHOLANGIOPANCREATOGRAPHY, EXCHANGE TUBE/STENT N/A 2021    Procedure: ENDOSCOPIC RETROGRADE CHOLANGIOPANCREATOGRAPHY with biliary dilation, stone removal, stent exchange;  Surgeon: Gregory Gabriel MD;  [facility]     ENDOSCOPIC RETROGRADE CHOLANGIOPANCREATOGRAPHY, EXCHANGE TUBE/STENT N/A 10/1/2021    Procedure: ENDOSCOPIC RETROGRADE CHOLANGIOPANCREATOGRAPHY with balloon sweep of bile ducts, bile duct stent exchanged;  Surgeon: Gregory Gabriel MD;  Location[facility]     ESOPHAGOSCOPY, GASTROSCOPY, DUODENOSCOPY (EGD), COMBINED N/A 2021    Procedure: ESOPHAGOGASTRODUODENOSCOPY (EGD);  Surgeon: Leventhal, Thomas Michael, MD;  Location: [facility]     ESOPHAGOSCOPY, GASTROSCOPY, DUODENOSCOPY (EGD), COMBINED N/A 10/1/2021    Procedure: ESOPHAGOGASTRODUODENOSCOPY (EGD) with varices banding;  Surgeon: Gregory Gabriel MD;  Location: [facility]     GYN SURGERY      bilat fallopian tubes and ovaries removed     PICC DOUBLE LUMEN PLACEMENT Right 2021    42cm (2cm external), Lateral brachial vein     RETURN LIVER TRANSPLANT N/A 2021    Procedure: CLOSURE, WOUND, ABDOMEN, SECONDARY, ABDOMINAL WASHOUT;  Surgeon: Ernesto Schmitt MD;  Location: [facility]     TRANSPLANT LIVER RECIPIENT  DONOR N/A 2021    Procedure: Opening of abdomen, Abdominal Exploration and aborted liver transplant.;  Surgeon: Ernesto Schmitt MD;  Location: [facility]     TRANSPLANT LIVER RECIPIENT  DONOR N/A 2021    Procedure: TRANSPLANT, LIVER, RECIPIENT,  DONOR;  Surgeon: Ernesto Schmitt MD;  Location: [facility]     Social History     Socioeconomic History     Marital status:      Spouse name: Not on file     Number of  children: Not on file     Years of education: Not on file     Highest education level: Not on file   Occupational History     Not on file   Tobacco Use     Smoking status: Never Smoker     Smokeless tobacco: Never Used   Vaping Use     Vaping Use: Never used   Substance and Sexual Activity     Alcohol use: No     Comment: Last drink was 2017     Drug use: No     Sexual activity: Not Currently     Partners: Male   Other Topics Concern     Parent/sibling w/ CABG, MI or angioplasty before 65F 55M? Not Asked   Social History Narrative     Not on file     Social Determinants of Health     Financial Resource Strain: Not on file   Food Insecurity: Not on file   Transportation Needs: Not on file   Physical Activity: Not on file   Stress: Not on file   Social Connections: Not on file   Intimate Partner Violence: Not on file   Housing Stability: Not on file     Prescription Medications as of 2021       Rx Number Disp Refills Start End Last Dispensed Date Next Fill Date Owning Pharmacy    aspirin (ASA) 325 MG tablet  90 tablet 3 2021    [facility] [id number] [place] [address] [address] [place] [address]    Si tablet (325 mg) by Oral or Feeding Tube route daily    Class: E-Prescribe    Route: Oral or Feeding Tube    furosemide (LASIX) 20 MG tablet  90 tablet 1 2021    [facility] #[id number] [address] [place] [address]    Sig: Take 1 tablet (20 mg) by mouth daily    Class: E-Prescribe    Route: Oral    Guar Gum (FIBER MODULAR, NUTRISOURCE FIBER,) packet  75 packet 0 2021    [facility] [place], [place] - [address] [place] [address]    Si packet by Per Feeding Tube route 3 times daily    Class: No Print Out    Route: Per Feeding Tube    magnesium oxide (MAG-OX) 400 MG tablet  180 tablet 3 2021    [facility] [id number] [place] [address] [address] [place] [address]    Sig: Take 1  tablet (400 mg) by mouth 2 times daily    Class: E-Prescribe    Route: Oral    mirtazapine (REMERON) 15 MG tablet  30 tablet 0 12/2/2021    [facility] [address] [place] [address] [address] [place] [address]    Sig: Take 1 tablet (15 mg) by mouth At Bedtime    Class: E-Prescribe    Route: Oral    multivitamin (CENTRUM SILVER) tablet            Sig: Take 1 tablet by mouth daily    Class: Historical    Route: Oral    mycophenolate (GENERIC EQUIVALENT) 250 MG capsule  540 capsule 3 11/30/2021    [facility] [address] [place] [address] [address] [place] [address]    Sig: Take 3 capsules (750 mg) by mouth 2 times daily    Class: E-Prescribe    Notes to Pharmacy: TXP DT 11/18/2021 (Liver) TXP Dischg DT 11/27/2021 DX Liver replaced by transplant Z94.4 [facility] ([place], [place])    Route: Oral    pantoprazole (PROTONIX) 40 MG EC tablet  180 tablet 3 11/30/2021    [facility] [address] [place] [address] [address] [place] [address]    Sig: Take 1 tablet (40 mg) by mouth 2 times daily    Class: E-Prescribe    Route: Oral    phosphorus tablet 250 mg (PHOSPHA 250 NEUTRAL) 250 MG per tablet  90 tablet 3 11/30/2021    [facility] [address] [place] [address] [address] [place] [address]    Sig: Take 1 tablet (250 mg) by mouth daily    Class: E-Prescribe    Route: Oral    predniSONE (DELTASONE) 5 MG tablet  90 tablet 3 12/2/2021    [facility] [place] [address] [address] [place] [address]    Sig: Take 1 tablet (5 mg) by mouth daily    Class: E-Prescribe    Notes to Pharmacy: TXP DT 11/18/2021 (Liver) TXP Dischg DT 11/27/2021 DX Liver replaced by transplant Z94.4 [facility] ([place], [place])    Route: Oral    protein modular (PROSOURCE TF) LIQD  30 packet 0 11/28/2021    [facility] - [address]    Si packet by Per Feeding Tube route daily    Class: E-Prescribe    Route: Per Feeding Tube    sulfamethoxazole-trimethoprim (BACTRIM) 400-80 MG tablet  90 tablet 3 2021    [facility] [address] [place] [address] [address] [place] [address]    Sig: Take 1 tablet by mouth daily    Class: E-Prescribe    Route: Oral    tacrolimus (GENERIC EQUIVALENT) 1 MG capsule  180 capsule 3 2021    [facility] [id number] [place] [address] [address] [place] [address]    Sig: Take 1 capsule (1 mg) by mouth 2 times daily    Class: E-Prescribe    Notes to Pharmacy: TXP DT 2021 (Liver) TXP Dischg DT 2021 DX Liver replaced by transplant Z94.4 [facility] ([place], [place])    Route: Oral    ursodiol (ACTIGALL) 300 MG capsule  180 capsule 3 2021    [facility] [address] [place] [address] [address] [place] [address]    Sig: Take 1 capsule (300 mg) by mouth 2 times daily    Class: E-Prescribe    Route: Oral    valGANciclovir (VALCYTE) 450 MG tablet  90 tablet 3 2021    [facility] [id number] [place] [address] [address] [place] [address]    Sig: Take 1 tablet (450 mg) by mouth daily    Class: E-Prescribe    Route: Oral    ondansetron (ZOFRAN-ODT) 4 MG ODT tab  20 tablet 1 2021    [facility] [id number] [place] [address] [address] [place] [address]    Sig: Take 1 tablet (4 mg) by mouth every 8 hours as needed for nausea    Class: E-Prescribe    Route: Oral    oxyCODONE (ROXICODONE) 5 MG tablet  15 tablet 0 2021    [facility] [address]    Si tablet (5 mg) by Oral or Feeding Tube route every 4 hours as needed for moderate to severe pain    Class: E-Prescribe    Earliest Fill Date: 2021     Route: Oral or Feeding Tube    polyethylene glycol (MIRALAX) 17 GM/Dose powder  510 g 0 11/27/2021    [address]    Sig: Take 17 g by mouth daily HOLD for loose stools    Class: E-Prescribe    Route: Oral    senna-docusate (SENOKOT-S/PERICOLACE) 8.6-50 MG tablet  60 tablet 0 11/27/2021    [address]    Sig: Take 1 tablet by mouth 2 times daily HOLD for loose stools    Class: E-Prescribe    Route: Oral    tacrolimus (GENERIC EQUIVALENT) 0.5 MG capsule  60 capsule 1 11/27/2021    [address]    Sig: Take 1 capsule by mouth twice daily as needed for dose titration    Class: E-Prescribe        Diagnostic x-ray materials and Contrast dye   REVIEW OF SYSTEMS (check box if normal)  [x]               GENERAL  [x]                 PULMONARY [x]                GENITOURINARY  [x]                CNS                 [x]                 CARDIAC  [x]                 ENDOCRINE  [x]                EARS,NOSE,THROAT [x]                 GASTROINTESTINAL [x]                 NEUROLOGIC    [x]                MUSCLOSKELTAL  [x]                  HEMATOLOGY      PHYSICAL EXAM (check box if normal)/69   Pulse 81   Wt 58.9 kg (129 lb 14.4 oz)   LMP  (LMP Unknown)   SpO2 100%   Breastfeeding No   BMI 21.62 kg/m          [x]            GENERAL:    [x]       EYES:  ICTERIC   []        YES  []                    NO  [x]           EXTREMITIES: Clubbing []       Y     [x]           N    [x]           EARS, NOSE, THROAT: Membranes Moist    YES   [x]                   NO []                  [x]           LUNGS:  CLEAR    YES       [x]                  NO    []                                [x]           SKIN: Jaundice           YES       []                  NO    [x]                   Rash: YES       []                  NO    [x]                                      [x]             HEART: Regular Rate          YES       [x]                  NO    []                   Incision Clean:  YES       [x]                  NO    []                                [x]                    ABDOMEN: Wound healing well Organomegaly YES       []                  NO    [x]                       [x]                    NEUROLOGICAL:  Nonfocal  YES       [x]                  NO    []                       [x]                    Hernia YES       []                  NO    [x]                   PSYCHIATRIC:  Appropriate  YES       [x]                  NO    []                       OTHER:                                                                                                   PAIN SCALE:: 3    Again, thank you for allowing me to participate in the care of your patient.      Sincerely,    Ernesto Schmitt MD

## 2021-12-07 ENCOUNTER — HOME INFUSION (PRE-WILLOW HOME INFUSION) (OUTPATIENT)
Dept: PHARMACY | Facility: CLINIC | Age: 56
End: 2021-12-07
Payer: COMMERCIAL

## 2021-12-07 ENCOUNTER — TELEPHONE (OUTPATIENT)
Dept: TRANSPLANT | Facility: CLINIC | Age: 56
End: 2021-12-07
Payer: COMMERCIAL

## 2021-12-07 DIAGNOSIS — Z94.4 LIVER REPLACED BY TRANSPLANT (H): Primary | ICD-10-CM

## 2021-12-07 DIAGNOSIS — Z94.4 STATUS POST LIVER TRANSPLANTATION (H): ICD-10-CM

## 2021-12-07 RX ORDER — TACROLIMUS 1 MG/1
2 CAPSULE ORAL EVERY 12 HOURS
Qty: 360 CAPSULE | Refills: 3 | Status: SHIPPED | OUTPATIENT
Start: 2021-12-07 | End: 2021-12-10

## 2021-12-07 NOTE — TELEPHONE ENCOUNTER
ISSUE:   Tacrolimus IR level <3 on 12/6, goal 5 per Bruce note 12/6), dose 1 mg BID.    PLAN:   Please call patient and confirm this was an accurate 12-hour trough. Verify Tacrolimus IR dose. Confirm no new medications or illness. Confirm no missed doses. If accurate trough and accurate dose, increase Tacrolimus IR dose to 2 mg BID and repeat labs on Thursday.  Make sure Berta takes 2 mg this morning.    Per Oskar note from yesterday, lasix and phosphorus were discontinued (not removed from med list).  I removed.  Vanessa Durant, RN  OUTCOME:   Spoke with Karin, they confirm accurate trough level and current dose 1 mg BID. Patient confirmed dose change to 2 mg BID and to repeat labs in 3 days. Orders sent to preferred pharmacy for dose change and lab for repeat labs. Karin voiced understanding of plan.     Anne Marie Miranda LPN

## 2021-12-07 NOTE — TELEPHONE ENCOUNTER
ISSUE:  Needs follow up for suture removal and pus pocket on suture line.    PLAN:  Will be seen by Dr. Peterson on 12/8. Expect a call from scheduling.     OUTCOME:  RNCC spoke with Antolin, who voiced understanding that they will receive a call from scheduling and see Dr. Peterson tomorrow.

## 2021-12-08 ENCOUNTER — TELEPHONE (OUTPATIENT)
Dept: TRANSPLANT | Facility: CLINIC | Age: 56
End: 2021-12-08
Payer: COMMERCIAL

## 2021-12-08 ENCOUNTER — OFFICE VISIT (OUTPATIENT)
Dept: TRANSPLANT | Facility: CLINIC | Age: 56
End: 2021-12-08
Attending: TRANSPLANT SURGERY
Payer: COMMERCIAL

## 2021-12-08 VITALS
SYSTOLIC BLOOD PRESSURE: 124 MMHG | DIASTOLIC BLOOD PRESSURE: 78 MMHG | BODY MASS INDEX: 21.05 KG/M2 | HEART RATE: 90 BPM | WEIGHT: 126.5 LBS | OXYGEN SATURATION: 100 %

## 2021-12-08 DIAGNOSIS — Z94.4 S/P LIVER TRANSPLANT (H): Primary | ICD-10-CM

## 2021-12-08 DIAGNOSIS — Z94.4 LIVER REPLACED BY TRANSPLANT (H): ICD-10-CM

## 2021-12-08 LAB
ANION GAP SERPL CALCULATED.3IONS-SCNC: 5 MMOL/L (ref 3–14)
BACTERIA BLD CULT: NO GROWTH
BACTERIA BLD CULT: NO GROWTH
BUN SERPL-MCNC: 13 MG/DL (ref 7–30)
CALCIUM SERPL-MCNC: 8.2 MG/DL (ref 8.5–10.1)
CHLORIDE BLD-SCNC: 112 MMOL/L (ref 94–109)
CO2 SERPL-SCNC: 21 MMOL/L (ref 20–32)
CREAT SERPL-MCNC: 0.56 MG/DL (ref 0.52–1.04)
GFR SERPL CREATININE-BSD FRML MDRD: >90 ML/MIN/1.73M2
GLUCOSE BLD-MCNC: 80 MG/DL (ref 70–99)
POTASSIUM BLD-SCNC: 4.4 MMOL/L (ref 3.4–5.3)
SODIUM SERPL-SCNC: 138 MMOL/L (ref 133–144)

## 2021-12-08 PROCEDURE — 99214 OFFICE O/P EST MOD 30 MIN: CPT | Mod: 24 | Performed by: SURGERY

## 2021-12-08 NOTE — PROGRESS NOTES
This is a recent snapshot of the patient's [facility] medical record.  For current drug dose and complete information and questions, call [phone] or In Basket pool, [facility] ([id number])  CSN Number:  [id number]

## 2021-12-08 NOTE — TELEPHONE ENCOUNTER
Call to [facility] as I haven't received all results yet.   The person that answered will send a message to the clinical supervisor and have them give me a call.      Berta came to see Dr. Peterson today to have her wound checked.  I called Karin to see how it went.  They removed a few stitches and left it open until she see's Dr. Schmitt on Monday.    Incorrect labs were drawn in our own clinic on Monday.  She didn't get a CBCp or a metabolic panel.  Will see if we can add.

## 2021-12-08 NOTE — LETTER
12/8/2021     RE: Berta Nava  [address] [facility] [id number]    Dear Colleague,    Thank you for referring your patient, Berta Nava, to the [facility]. Please see a copy of my visit note below.    Transplant Surgery Progress Note    Transplants:  11/18/2021 (Liver); Postoperative day:  22  S: patient doing well, having drainage from center of wound, appears purulent, no fevers or chills, good appetite     Transplant History:    Donor Type: DBD   Transplant Date:  11/18/2021 (Liver)  Transplant coordinator: Vanessa Durant [phone].  Biliary stent: yes  Donor status: DBD  CMV D + / R -  EBV D + / R +     Immunosuppressant Medications     Immunosuppressive Agents Disp Start End     mycophenolate (GENERIC EQUIVALENT) 250 MG capsule    540 capsule 11/30/2021     Sig - Route: Take 3 capsules (750 mg) by mouth 2 times daily - Oral    Class: E-Prescribe    Notes to Pharmacy: TXP DT 11/18/2021 (Liver) TXP Dischg DT 11/27/2021 DX Liver replaced by transplant Z94.4 TX [facility] ([place], [place])     tacrolimus (GENERIC EQUIVALENT) 0.5 MG capsule    60 capsule 11/27/2021     Sig: Take 1 capsule by mouth twice daily as needed for dose titration    Patient not taking: Reported on 12/2/2021       Class: E-Prescribe     tacrolimus (GENERIC EQUIVALENT) 1 MG capsule    540 capsule 12/10/2021     Sig - Route: Take 3 capsules (3 mg) by mouth every 12 hours - Oral    Class: E-Prescribe    Notes to Pharmacy: Dose change          Possible Immunosuppression-related side effects:   []             headache  []             vivid dreams  []             irritability  []             cognitive difficuties  []             fine tremor  []             nausea  []             diarrhea  []             neuropathy      []             edema  []             renal calcineurin toxicity  []             hyperkalemia  []             post-transplant diabetes  []              decreased appetite  []             increased appetite  []             other:  []             none    Prescription Medications as of 12/10/2021       Rx Number Disp Refills Start End Last Dispensed Date Next Fill Date Owning Pharmacy    tacrolimus (GENERIC EQUIVALENT) 1 MG capsule  540 capsule 3 12/10/2021    [facility] [address] [place] [address] [address] [place] [address]    Sig: Take 3 capsules (3 mg) by mouth every 12 hours    Class: E-Prescribe    Notes to Pharmacy: Dose change    Route: Oral    amoxicillin-clavulanate (AUGMENTIN) 875-125 MG tablet  14 tablet 0 2021    [facility] [address] [place] [address] [address] [place] [address]    Sig: Take 1 tablet by mouth 2 times daily    Class: Local Print    Route: Oral    aspirin (ASA) 325 MG tablet  90 tablet 3 2021    [facility] [address] [place] [address] [address] [place] [address]    Si tablet (325 mg) by Oral or Feeding Tube route daily    Class: E-Prescribe    Route: Oral or Feeding Tube    Guar Gum (FIBER MODULAR, NUTRISOURCE FIBER,) packet  75 packet 0 2021    [facility] [place], [place] - [address] [place] [address]    Si packet by Per Feeding Tube route 3 times daily    Class: No Print Out    Route: Per Feeding Tube    magnesium oxide (MAG-OX) 400 MG tablet  180 tablet 3 2021    [facility] [address] [place] [address] [address] [place] [address]    Sig: Take 1 tablet (400 mg) by mouth 2 times daily    Class: E-Prescribe    Route: Oral    mirtazapine (REMERON) 15 MG tablet  30 tablet 0 2021    [facility] [address] [place] [address] [address] [place] [address]    Sig: Take 1 tablet (15 mg) by mouth At Bedtime    Class: E-Prescribe    Route: Oral    multivitamin (CENTRUM SILVER) tablet            Sig: Take 1 tablet by mouth daily    Class: Historical    Route:  Oral    mycophenolate (GENERIC EQUIVALENT) 250 MG capsule  540 capsule 3 2021    [facility] #[id number] [address] [place] [address]    Sig: Take 3 capsules (750 mg) by mouth 2 times daily    Class: E-Prescribe    Notes to Pharmacy: TXP DT 2021 (Liver) TXP Dischg DT 2021 DX Liver replaced by transplant Z94.4 TX [facility] ([place], [place])    Route: Oral    pantoprazole (PROTONIX) 40 MG EC tablet  180 tablet 3 2021    [facility] #[id number] [address] [place] [address]    Sig: Take 1 tablet (40 mg) by mouth 2 times daily    Class: E-Prescribe    Route: Oral    predniSONE (DELTASONE) 5 MG tablet  90 tablet 3 2021    [facility] #[id number] [place] [address] [address] [place] [address]    Sig: Take 1 tablet (5 mg) by mouth daily    Class: E-Prescribe    Notes to Pharmacy: TXP DT 2021 (Liver) TXP Dischg DT 2021 DX Liver replaced by transplant Z94.4 TX [facility] ([place], [place])    Route: Oral    protein modular (PROSOURCE TF) LIQD  30 packet 0 2021    [facility] [address]    Si packet by Per Feeding Tube route daily    Class: E-Prescribe    Route: Per Feeding Tube    sulfamethoxazole-trimethoprim (BACTRIM) 400-80 MG tablet  90 tablet 3 2021    [facility] #[id number] [place] [address] [address] [place] [address]    Sig: Take 1 tablet by mouth daily    Class: E-Prescribe    Route: Oral    tacrolimus (GENERIC EQUIVALENT) 0.5 MG capsule  60 capsule 1 2021    [facility] [address]    Sig: Take 1 capsule by mouth twice daily as needed for dose titration    Class: E-Prescribe    ursodiol (ACTIGALL) 300 MG capsule  180 capsule 3  11/30/2021    [id number] [address] [place] [address]    Sig: Take 1 capsule (300 mg) by mouth 2 times daily    Class: E-Prescribe    Route: Oral    valGANciclovir (VALCYTE) 450 MG tablet  90 tablet 3 11/30/2021    [facility] #[id number] [address] [place] [address]    Sig: Take 1 tablet (450 mg) by mouth daily    Class: E-Prescribe    Route: Oral          O:      General Appearance: in no apparent distress.   Skin: Normal, no rashes or jaundice  Heart: regular rate and rhythm  Lungs: easy respirations, no audible wheezing.  Abdomen: flat, The wound is with purulent drainage from the center, without hernia. The abdomen is non-tender. The liver graft is not tender.  There is no ascites.  Extremities: Tremor absent.   Edema: absent.     Transplant Immunosuppression Labs Latest Ref Rng & Units 12/9/2021 12/6/2021 12/3/2021 12/2/2021 11/30/2021   Creat 0.52 - 1.04 mg/dL 0.60 0.56 0.65 0.78 1.02   BUN 7 - 30 mg/dL 19 13 24 28 41(H)   WBC 4.0 - 11.0 10e3/uL 8.4 - 12.7(H) 10.6 13.1(H)   Neutrophil % - - 91 - -   ANEU 1.6 - 8.3 10e9/L - - - - -       Chemistries:   Recent Labs   Lab Test 12/09/21  0845   BUN 19   CR 0.60   GFRESTIMATED >90   GLC 85     Lab Results   Component Value Date    A1C 5.4 11/19/2021    A1C 4.3 04/29/2021     Recent Labs   Lab Test 12/09/21  0845   ALBUMIN 2.2*   BILITOTAL 0.8   ALKPHOS 122   AST 14   ALT 17     Urine Studies:  Recent Labs   Lab Test 12/03/21  1342   COLOR Yellow   APPEARANCE Clear   URINEGLC Negative   URINEBILI Negative   URINEKETONE Negative   SG 1.020   UBLD Negative   URINEPH 6.0   PROTEIN 10 *   NITRITE Negative   LEUKEST Negative   RBCU 1   WBCU 1     Recent Labs   Lab Test 05/21/21  0730   UTPG 0.26*     Hematology:   Recent Labs   Lab Test 12/09/21  0845 12/03/21  1145 12/02/21  0821   HGB 8.2* 7.6* 9.2*   * 396 497*   WBC 8.4 12.7* 10.6     Coags:   Recent Labs    Lab Test 12/03/21  1145 11/22/21  0419   INR 1.13 1.22*     HLA antibodies:   No results found for: [id number], [id number], [id number], [id number]    Assessment: Berta Nava is doing well s/p Liver Tx:  Issues we addressed during her visit include:    Plan:    1.Graft Function:Liver allograft: no rejection or technical problems.  2.Immunosuppression Management: keep tacrolimus levels at 5 ng/ml  3.Hypertension:ok  4.Renal Function: improving  5.Lab frequency: weekly  6.Other:  Wound at the center ( at [facility]) opened and packed, Augmentin x7 days     Followup: 1 week    Total Time: 15 min,   Counselling Time: 10 min.    Taqueria Peterson MD

## 2021-12-08 NOTE — TELEPHONE ENCOUNTER
Spoke to Jeanette and requested that the cbc and bmp get added on to 12/6 lab draw and to credit the HBV HCV HIV by VILMA as it was drawn too soon. Jeanette will chat with the person who released the lab too soon to prevent from happening again.

## 2021-12-09 ENCOUNTER — LAB REQUISITION (OUTPATIENT)
Dept: LAB | Facility: CLINIC | Age: 56
End: 2021-12-09
Payer: COMMERCIAL

## 2021-12-09 ENCOUNTER — MEDICAL CORRESPONDENCE (OUTPATIENT)
Dept: HEALTH INFORMATION MANAGEMENT | Facility: CLINIC | Age: 56
End: 2021-12-09
Payer: COMMERCIAL

## 2021-12-09 ENCOUNTER — TELEPHONE (OUTPATIENT)
Dept: TRANSPLANT | Facility: CLINIC | Age: 56
End: 2021-12-09

## 2021-12-09 ENCOUNTER — TELEPHONE (OUTPATIENT)
Dept: INTERNAL MEDICINE | Facility: CLINIC | Age: 56
End: 2021-12-09

## 2021-12-09 DIAGNOSIS — Z94.4 LIVER TRANSPLANT STATUS (H): ICD-10-CM

## 2021-12-09 LAB
ALBUMIN SERPL-MCNC: 2.2 G/DL (ref 3.4–5)
ALP SERPL-CCNC: 122 U/L (ref 40–150)
ALT SERPL W P-5'-P-CCNC: 17 U/L (ref 0–50)
ANION GAP SERPL CALCULATED.3IONS-SCNC: 7 MMOL/L (ref 3–14)
AST SERPL W P-5'-P-CCNC: 14 U/L (ref 0–45)
BILIRUB DIRECT SERPL-MCNC: 0.5 MG/DL (ref 0–0.2)
BILIRUB SERPL-MCNC: 0.8 MG/DL (ref 0.2–1.3)
BUN SERPL-MCNC: 19 MG/DL (ref 7–30)
CALCIUM SERPL-MCNC: 8.7 MG/DL (ref 8.5–10.1)
CHLORIDE BLD-SCNC: 110 MMOL/L (ref 94–109)
CO2 SERPL-SCNC: 22 MMOL/L (ref 20–32)
CREAT SERPL-MCNC: 0.6 MG/DL (ref 0.52–1.04)
ERYTHROCYTE [DISTWIDTH] IN BLOOD BY AUTOMATED COUNT: 16 % (ref 10–15)
GFR SERPL CREATININE-BSD FRML MDRD: >90 ML/MIN/1.73M2
GLUCOSE BLD-MCNC: 85 MG/DL (ref 70–99)
HCT VFR BLD AUTO: 26.7 % (ref 35–47)
HGB BLD-MCNC: 8.2 G/DL (ref 11.7–15.7)
MAGNESIUM SERPL-MCNC: 1.8 MG/DL (ref 1.6–2.3)
MCH RBC QN AUTO: 28.1 PG (ref 26.5–33)
MCHC RBC AUTO-ENTMCNC: 30.7 G/DL (ref 31.5–36.5)
MCV RBC AUTO: 91 FL (ref 78–100)
PHOSPHATE SERPL-MCNC: 2.7 MG/DL (ref 2.5–4.5)
PLATELET # BLD AUTO: 486 10E3/UL (ref 150–450)
POTASSIUM BLD-SCNC: 3.9 MMOL/L (ref 3.4–5.3)
PROT SERPL-MCNC: 6.1 G/DL (ref 6.8–8.8)
RBC # BLD AUTO: 2.92 10E6/UL (ref 3.8–5.2)
SODIUM SERPL-SCNC: 139 MMOL/L (ref 133–144)
TACROLIMUS BLD-MCNC: 3.8 UG/L (ref 5–15)
TME LAST DOSE: ABNORMAL H
TME LAST DOSE: ABNORMAL H
WBC # BLD AUTO: 8.4 10E3/UL (ref 4–11)

## 2021-12-09 PROCEDURE — 80053 COMPREHEN METABOLIC PANEL: CPT | Mod: ORL | Performed by: NURSE PRACTITIONER

## 2021-12-09 PROCEDURE — 85027 COMPLETE CBC AUTOMATED: CPT | Mod: ORL | Performed by: NURSE PRACTITIONER

## 2021-12-09 PROCEDURE — 84100 ASSAY OF PHOSPHORUS: CPT | Mod: ORL | Performed by: NURSE PRACTITIONER

## 2021-12-09 PROCEDURE — 82248 BILIRUBIN DIRECT: CPT | Mod: ORL | Performed by: NURSE PRACTITIONER

## 2021-12-09 PROCEDURE — 83735 ASSAY OF MAGNESIUM: CPT | Mod: ORL | Performed by: NURSE PRACTITIONER

## 2021-12-09 PROCEDURE — 80197 ASSAY OF TACROLIMUS: CPT | Mod: ORL | Performed by: NURSE PRACTITIONER

## 2021-12-09 NOTE — TELEPHONE ENCOUNTER
"Call to Berta to check in.  She tells me she is \"doing pretty well today\".  She is still working on eating , has night time feedings. No nausea, vomiting or fever.  Hasn't looked at wound yet today.  She knows to call if she develops fever or other symptoms.  "

## 2021-12-09 NOTE — TELEPHONE ENCOUNTER
[facility] order date 12-7    Skilled nursing PHYSICAL THERAPY occupational therapy speech language therapy

## 2021-12-10 ENCOUNTER — TELEPHONE (OUTPATIENT)
Dept: TRANSPLANT | Facility: CLINIC | Age: 56
End: 2021-12-10
Payer: COMMERCIAL

## 2021-12-10 DIAGNOSIS — Z94.4 STATUS POST LIVER TRANSPLANTATION (H): ICD-10-CM

## 2021-12-10 RX ORDER — TACROLIMUS 1 MG/1
3 CAPSULE ORAL EVERY 12 HOURS
Qty: 540 CAPSULE | Refills: 3 | Status: SHIPPED | OUTPATIENT
Start: 2021-12-10 | End: 2021-12-14

## 2021-12-10 NOTE — PROGRESS NOTES
Transplant Surgery Progress Note    Transplants:  11/18/2021 (Liver); Postoperative day:  22  S: patient doing well, having drainage from center of wound, appears purulent, no fevers or chills, good appetite     Transplant History:    Donor Type: DBD   Transplant Date:  11/18/2021 (Liver)  Transplant coordinator: Vanessa Durant [phone].  Biliary stent: yes  Donor status: DBD  CMV D + / R -  EBV D + / R +     Immunosuppressant Medications     Immunosuppressive Agents Disp Start End     mycophenolate (GENERIC EQUIVALENT) 250 MG capsule    540 capsule 11/30/2021     Sig - Route: Take 3 capsules (750 mg) by mouth 2 times daily - Oral    Class: E-Prescribe    Notes to Pharmacy: TXP DT 11/18/2021 (Liver) TXP Dischg DT 11/27/2021 DX Liver replaced by transplant Z94.4 TX [facility] ([place], [place])     tacrolimus (GENERIC EQUIVALENT) 0.5 MG capsule    60 capsule 11/27/2021     Sig: Take 1 capsule by mouth twice daily as needed for dose titration    Patient not taking: Reported on 12/2/2021       Class: E-Prescribe     tacrolimus (GENERIC EQUIVALENT) 1 MG capsule    540 capsule 12/10/2021     Sig - Route: Take 3 capsules (3 mg) by mouth every 12 hours - Oral    Class: E-Prescribe    Notes to Pharmacy: Dose change          Possible Immunosuppression-related side effects:   []             headache  []             vivid dreams  []             irritability  []             cognitive difficuties  []             fine tremor  []             nausea  []             diarrhea  []             neuropathy      []             edema  []             renal calcineurin toxicity  []             hyperkalemia  []             post-transplant diabetes  []             decreased appetite  []             increased appetite  []             other:  []             none    Prescription Medications as of 12/10/2021       Rx Number Disp Refills Start End Last Dispensed Date Next Fill Date Owning Pharmacy     tacrolimus (GENERIC EQUIVALENT) 1 MG capsule  540 capsule 3 12/10/2021    [facility] [address] [place] [address] [address] [place] [address]    Sig: Take 3 capsules (3 mg) by mouth every 12 hours    Class: E-Prescribe    Notes to Pharmacy: Dose change    Route: Oral    amoxicillin-clavulanate (AUGMENTIN) 875-125 MG tablet  14 tablet 0 2021    [facility] [address] [place] [address] [address] [place] [address]    Sig: Take 1 tablet by mouth 2 times daily    Class: Local Print    Route: Oral    aspirin (ASA) 325 MG tablet  90 tablet 3 2021    [facility] [address] [place] [address] [address] [place] [address]    Si tablet (325 mg) by Oral or Feeding Tube route daily    Class: E-Prescribe    Route: Oral or Feeding Tube    Guar Gum (FIBER MODULAR, NUTRISOURCE FIBER,) packet  75 packet 0 2021    [facility] [place], [place] - [address] [place] [address]    Si packet by Per Feeding Tube route 3 times daily    Class: No Print Out    Route: Per Feeding Tube    magnesium oxide (MAG-OX) 400 MG tablet  180 tablet 3 2021    [facility] [address] [place] [address] [address] [place] [address]    Sig: Take 1 tablet (400 mg) by mouth 2 times daily    Class: E-Prescribe    Route: Oral    mirtazapine (REMERON) 15 MG tablet  30 tablet 0 2021    [facility] [address] [place] [address] [address] [place] [address]    Sig: Take 1 tablet (15 mg) by mouth At Bedtime    Class: E-Prescribe    Route: Oral    multivitamin (CENTRUM SILVER) tablet            Sig: Take 1 tablet by mouth daily    Class: Historical    Route: Oral    mycophenolate (GENERIC EQUIVALENT) 250 MG capsule  540 capsule 3 2021    [facility] #[id number] [place] [address] [address] [place] [address]    Sig: Take 3 capsules (750 mg) by mouth 2 times  daily    Class: E-Prescribe    Notes to Pharmacy: TXP DT 2021 (Liver) TXP Dischg DT 2021 DX Liver replaced by transplant Z94.4 TX [facility] ([place], [place])    Route: Oral    pantoprazole (PROTONIX) 40 MG EC tablet  180 tablet 3 2021    [facility] #[id number] [place] [address] [address] [place] [address]    Sig: Take 1 tablet (40 mg) by mouth 2 times daily    Class: E-Prescribe    Route: Oral    predniSONE (DELTASONE) 5 MG tablet  90 tablet 3 2021    [facility] #[id number] [place] [address] [address] [place] [address]    Sig: Take 1 tablet (5 mg) by mouth daily    Class: E-Prescribe    Notes to Pharmacy: TXP DT 2021 (Liver) TXP Dischg DT 2021 DX Liver replaced by transplant Z94.4 TX [facility] ([place], [place])    Route: Oral    protein modular (PROSOURCE TF) LIQD  30 packet 0 2021    [facility] [address]    Si packet by Per Feeding Tube route daily    Class: E-Prescribe    Route: Per Feeding Tube    sulfamethoxazole-trimethoprim (BACTRIM) 400-80 MG tablet  90 tablet 3 2021    [facility] #[id number] [place] [address] [address] [place] [address]    Sig: Take 1 tablet by mouth daily    Class: E-Prescribe    Route: Oral    tacrolimus (GENERIC EQUIVALENT) 0.5 MG capsule  60 capsule 1 2021    [facility] [address]    Sig: Take 1 capsule by mouth twice daily as needed for dose titration    Class: E-Prescribe    ursodiol (ACTIGALL) 300 MG capsule  180 capsule 3 2021    [id number] [address] [place] [address]    Sig: Take 1 capsule (300 mg) by mouth 2 times daily    Class: E-Prescribe    Route: Oral    valGANciclovir (VALCYTE) 450 MG  tablet  90 tablet 3 11/30/2021    [facility] #[id number] - [place], [place] - [address] [place] [address] [address]    Sig: Take 1 tablet (450 mg) by mouth daily    Class: E-Prescribe    Route: Oral          O:      General Appearance: in no apparent distress.   Skin: Normal, no rashes or jaundice  Heart: regular rate and rhythm  Lungs: easy respirations, no audible wheezing.  Abdomen: flat, The wound is with purulent drainage from the center, without hernia. The abdomen is non-tender. The liver graft is not tender.  There is no ascites.  Extremities: Tremor absent.   Edema: absent.     Transplant Immunosuppression Labs Latest Ref Rng & Units 12/9/2021 12/6/2021 12/3/2021 12/2/2021 11/30/2021   Creat 0.52 - 1.04 mg/dL 0.60 0.56 0.65 0.78 1.02   BUN 7 - 30 mg/dL 19 13 24 28 41(H)   WBC 4.0 - 11.0 10e3/uL 8.4 - 12.7(H) 10.6 13.1(H)   Neutrophil % - - 91 - -   ANEU 1.6 - 8.3 10e9/L - - - - -       Chemistries:   Recent Labs   Lab Test 12/09/21  0845   BUN 19   CR 0.60   GFRESTIMATED >90   GLC 85     Lab Results   Component Value Date    A1C 5.4 11/19/2021    A1C 4.3 04/29/2021     Recent Labs   Lab Test 12/09/21  0845   ALBUMIN 2.2*   BILITOTAL 0.8   ALKPHOS 122   AST 14   ALT 17     Urine Studies:  Recent Labs   Lab Test 12/03/21  1342   COLOR Yellow   APPEARANCE Clear   URINEGLC Negative   URINEBILI Negative   URINEKETONE Negative   SG 1.020   UBLD Negative   URINEPH 6.0   PROTEIN 10 *   NITRITE Negative   LEUKEST Negative   RBCU 1   WBCU 1     Recent Labs   Lab Test 05/21/21  0730   UTPG 0.26*     Hematology:   Recent Labs   Lab Test 12/09/21  0845 12/03/21  1145 12/02/21  0821   HGB 8.2* 7.6* 9.2*   * 396 497*   WBC 8.4 12.7* 10.6     Coags:   Recent Labs   Lab Test 12/03/21  1145 11/22/21  0419   INR 1.13 1.22*     HLA antibodies:   No results found for: [id number], [id number], NA5CHTNOJ, [id number]    Assessment: Berta Nava is doing well s/p Liver Tx:  Issues we addressed during her  visit include:    Plan:    1.Graft Function:Liver allograft: no rejection or technical problems.  2.Immunosuppression Management: keep tacrolimus levels at 5 ng/ml  3.Hypertension:ok  4.Renal Function: improving  5.Lab frequency: weekly  6.Other:  Wound at the center ( at [facility]) opened and packed, Augmentin x7 days     Followup: 1 week    Total Time: 15 min,   Counselling Time: 10 min.    Taqueria Peterson MD

## 2021-12-10 NOTE — TELEPHONE ENCOUNTER
ISSUE:   Tacrolimus IR level 3.8 on 12/9, goal 5, dose 2 mg BID.    PLAN:   Please call patient and confirm this was an accurate 12-hour trough. Verify Tacrolimus IR dose.. Confirm no new medications or illness. Confirm no missed doses. If accurate trough and accurate dose, increase Tacrolimus IR dose to 3 mg BID and repeat labs in on Monday, 12/13.  Vanessa Durant RN    OUTCOME:   Spoke with patient, they confirm accurate trough level and current dose 2 mg BID. Patient confirmed dose change to 3 mg BID and to repeat labs on 12/13. Orders sent to preferred pharmacy for dose change and lab for repeat labs. Patient voiced understanding of plan.     Anne Marie Miranda LPN

## 2021-12-10 NOTE — PROGRESS NOTES
[facility] Solid Organ Transplant  Outpatient MNT: Post Liver Transplant    Time Spent: 15 minutes  Visit Type: follow up (saw pt for txp eval 3/2021)  Referring Physician: Oskar   Pt accompanied by: her dtrKarin     Date of txp: liver 11/18/21     Nutrition Assessment/Diet Recall  On appetite stimulant- working well   Wt down trending over past 7-10 days, about 8-10 lbs. Pt reports she was still retaining water at this time, but now feels dry at 121 lbs.     We started cycled feeds on 12/6 per coordinator request. Was on TwoCal HN @ 40 ml/hr continuously-->70 ml/hr x 12 hours: 840 ml formula, 1680 calories (31 kcal/kg), 70 g protein (1.3 g/kg) per 55 kg dosing weight.     She reports finally starting to eat more, but still not the most nutritious or balanced meals. Eating [employer] nachos a few times/week, Cheerios with skim milk (dislikes cooper fat milk), chicken noodle soup, rice, candy, waffles, gold fish, yogurt. She has some RTD shakes and protein bars at home, but they are not appealing to her.     Nutrition Diagnosis  Inadequate oral intake r/t poor appetite (now improving) AEB ongoing need for TF to meet 100% est nutrition needs until PO intake improves.     Nutrition Intervention  1. Discussed importance of consuming adequate calories and protein for 2 months post-txp to help heal and reduce muscle/fat wasting. Discussed sources of protein and ways to ensure she is increasing her protein intake.    Happy that her appetite has recently improved, but reviewed some more balanced options, including more protein. Pt reports many of these foods are not appealing to her currently, which is fairly common. Encouraged her to try a protein powder (hasn't tried before) and make a smoothie to help supplement nutrition.     2. Reviewed importance of food safety and increased risk for food-borne illness post-txp. Also discussed potential need to monitor K+, CHO, and Na+ intakes d/t medication side  effects.     3. Avoid the following post txp d/t risk for rejection, unknown effects on the organs, and/or potential interactions with immunosuppressants:  - Herbal, Chinese, holistic, chiropractic, natural, alternative medicines and supplements  - Detoxes and cleanses  - Weight loss pills  - Protein powders or other products with extracts or herbs (ie green tea extract)    Patient Understanding: Pt verbalized understanding of education provided.  Expected Engagement: Good  Follow-Up Plans: PRN     Nutrition Goals  TF to continue to meet 100% est nutrition needs until PO intake improves     Vania Cornejo, RD, LD, CCTD

## 2021-12-13 ENCOUNTER — ANCILLARY PROCEDURE (OUTPATIENT)
Dept: CT IMAGING | Facility: CLINIC | Age: 56
End: 2021-12-13
Attending: TRANSPLANT SURGERY
Payer: COMMERCIAL

## 2021-12-13 ENCOUNTER — OFFICE VISIT (OUTPATIENT)
Dept: TRANSPLANT | Facility: CLINIC | Age: 56
End: 2021-12-13
Attending: TRANSPLANT SURGERY
Payer: COMMERCIAL

## 2021-12-13 ENCOUNTER — LAB (OUTPATIENT)
Dept: LAB | Facility: CLINIC | Age: 56
End: 2021-12-13
Attending: TRANSPLANT SURGERY

## 2021-12-13 ENCOUNTER — TELEPHONE (OUTPATIENT)
Dept: TRANSPLANT | Facility: CLINIC | Age: 56
End: 2021-12-13

## 2021-12-13 VITALS
DIASTOLIC BLOOD PRESSURE: 79 MMHG | OXYGEN SATURATION: 100 % | SYSTOLIC BLOOD PRESSURE: 127 MMHG | WEIGHT: 121 LBS | HEART RATE: 95 BPM | BODY MASS INDEX: 20.14 KG/M2

## 2021-12-13 DIAGNOSIS — Z94.4 LIVER TRANSPLANTED (H): Primary | ICD-10-CM

## 2021-12-13 DIAGNOSIS — Z94.4 STATUS POST LIVER TRANSPLANT (H): ICD-10-CM

## 2021-12-13 DIAGNOSIS — Z94.4 LIVER REPLACED BY TRANSPLANT (H): ICD-10-CM

## 2021-12-13 LAB
ALBUMIN SERPL-MCNC: 2.6 G/DL (ref 3.4–5)
ALP SERPL-CCNC: 117 U/L (ref 40–150)
ALT SERPL W P-5'-P-CCNC: 36 U/L (ref 0–50)
ANION GAP SERPL CALCULATED.3IONS-SCNC: 6 MMOL/L (ref 3–14)
AST SERPL W P-5'-P-CCNC: 18 U/L (ref 0–45)
BILIRUB DIRECT SERPL-MCNC: 0.4 MG/DL (ref 0–0.2)
BILIRUB SERPL-MCNC: 0.6 MG/DL (ref 0.2–1.3)
BUN SERPL-MCNC: 22 MG/DL (ref 7–30)
CALCIUM SERPL-MCNC: 8.7 MG/DL (ref 8.5–10.1)
CHLORIDE BLD-SCNC: 110 MMOL/L (ref 94–109)
CO2 SERPL-SCNC: 24 MMOL/L (ref 20–32)
CREAT SERPL-MCNC: 0.55 MG/DL (ref 0.52–1.04)
ERYTHROCYTE [DISTWIDTH] IN BLOOD BY AUTOMATED COUNT: 17.5 % (ref 10–15)
GFR SERPL CREATININE-BSD FRML MDRD: >90 ML/MIN/1.73M2
GLUCOSE BLD-MCNC: 94 MG/DL (ref 70–99)
HCT VFR BLD AUTO: 25.7 % (ref 35–47)
HGB BLD-MCNC: 7.9 G/DL (ref 11.7–15.7)
INR PPP: 1.12 (ref 0.85–1.15)
MAGNESIUM SERPL-MCNC: 1.8 MG/DL (ref 1.6–2.3)
MCH RBC QN AUTO: 28 PG (ref 26.5–33)
MCHC RBC AUTO-ENTMCNC: 30.7 G/DL (ref 31.5–36.5)
MCV RBC AUTO: 91 FL (ref 78–100)
PHOSPHATE SERPL-MCNC: 3.3 MG/DL (ref 2.5–4.5)
PLATELET # BLD AUTO: 567 10E3/UL (ref 150–450)
POTASSIUM BLD-SCNC: 4.2 MMOL/L (ref 3.4–5.3)
PROT SERPL-MCNC: 6.3 G/DL (ref 6.8–8.8)
RBC # BLD AUTO: 2.82 10E6/UL (ref 3.8–5.2)
SODIUM SERPL-SCNC: 140 MMOL/L (ref 133–144)
TACROLIMUS BLD-MCNC: <3 UG/L (ref 5–15)
TME LAST DOSE: ABNORMAL H
TME LAST DOSE: ABNORMAL H
WBC # BLD AUTO: 8.8 10E3/UL (ref 4–11)

## 2021-12-13 PROCEDURE — 85610 PROTHROMBIN TIME: CPT | Performed by: PATHOLOGY

## 2021-12-13 PROCEDURE — 85027 COMPLETE CBC AUTOMATED: CPT | Performed by: PATHOLOGY

## 2021-12-13 PROCEDURE — 36415 COLL VENOUS BLD VENIPUNCTURE: CPT | Performed by: PATHOLOGY

## 2021-12-13 PROCEDURE — 99207 PR NO CHARGE COORDINATED CARE PS: CPT

## 2021-12-13 PROCEDURE — 80197 ASSAY OF TACROLIMUS: CPT | Performed by: TRANSPLANT SURGERY

## 2021-12-13 PROCEDURE — 82248 BILIRUBIN DIRECT: CPT | Performed by: PATHOLOGY

## 2021-12-13 PROCEDURE — 84100 ASSAY OF PHOSPHORUS: CPT | Performed by: PATHOLOGY

## 2021-12-13 PROCEDURE — 83735 ASSAY OF MAGNESIUM: CPT | Performed by: PATHOLOGY

## 2021-12-13 PROCEDURE — 74176 CT ABD & PELVIS W/O CONTRAST: CPT | Mod: GC | Performed by: RADIOLOGY

## 2021-12-13 PROCEDURE — 80053 COMPREHEN METABOLIC PANEL: CPT | Performed by: PATHOLOGY

## 2021-12-13 PROCEDURE — 99214 OFFICE O/P EST MOD 30 MIN: CPT | Mod: 24 | Performed by: TRANSPLANT SURGERY

## 2021-12-13 RX ORDER — PREDNISONE 5 MG/1
2.5 TABLET ORAL DAILY
Qty: 90 TABLET | Refills: 3 | COMMUNITY
Start: 2021-12-13 | End: 2021-12-20

## 2021-12-13 RX ORDER — GUAR GUM
60 PACKET (EA) ORAL
Qty: 60 PACKET | Refills: 3 | Status: SHIPPED | OUTPATIENT
Start: 2021-12-13 | End: 2021-12-23

## 2021-12-13 NOTE — PROGRESS NOTES
Transplant Surgery -OUTPATIENT IMMUNOSUPPRESSION PROGRESS NOTE    Date of Visit: 12/13/2021    Transplants:  11/18/2021 (Liver); Postoperative day:  25  ASSESMENT AND PLAN:  1.Graft Function: Liver allograft: no rejection or technical problems.    2.Immunosuppression Management: keep tacrolimus levels 5-6 ng/dL; reduce prednisone to 2.5 mg po daily to help wound healing  3.Hypertension: ok  4.Renal Function: better  5.Lab frequency: weekly  6.Other:  Abdominal wound explored: there is a 12 cm sinus on either side of the midline wound. Would recommend CT scan abdomen to evaluate to make sure there is  No fascial dehiscence    Date: December 13, 2021    Transplant:  [x]                             Liver [x]                              Kidney []                             Pancreas []                              Other:             Chief Complaint:  Doing well no fevers on tube feeds    History of Present Illness:  Patient Active Problem List   Diagnosis     Cholangitis     Hypokalemia     Chronic fatigue     Essential hypertension     Hyperlipidemia     Mixed hearing loss, bilateral     Neoplasm of uncertain behavior of skin     PSC (primary sclerosing cholangitis)     Sicca syndrome (H)     Ulcerative pancolitis with complication (H)     Unsatisfactory cervical Papanicolaou smear     Cirrhosis of liver with ascites (H)     Sepsis (H)     SBP (spontaneous bacterial peritonitis) (H)     Choledocholithiasis     Encounter for replacement of biliary stent     Secondary esophageal varices without bleeding (H)     Primary sclerosing cholangitis     Liver transplant planned     Liver transplant candidate     Acute kidney failure, unspecified (H)     Severe protein-calorie malnutrition (H)     Encephalopathy     Hyperammonemia (H)     Leukocytosis, unspecified type     Hepatic encephalopathy (H)     Chronic liver failure without hepatic coma (H)     Immunosuppressed status (H)     S/P liver transplant (H)     Anemia due to  chronic kidney disease     SOCIAL /FAMILY HISTORY: [x]                  No recent change    Past Medical History:   Diagnosis Date     Ascites      Biliary cirrhosis (H)      Cholangitis, sclerosing      Cirrhosis of liver with ascites (H) 3/3/2021     Hearing loss of left ear     wears a hearing aide     History of low potassium      Hyperlipidemia      Hypertension      SBP (spontaneous bacterial peritonitis) (H) 4/30/2021     Sjogren's syndrome (H)      Ulcerative colitis (H)      Ulcerative pancolitis (H)      Past Surgical History:   Procedure Laterality Date     BENCH LIVER N/A 11/18/2021    Procedure: Bench liver;  Surgeon: Ernesto Schmitt MD;  [facility]     CV CORONARY ANGIOGRAM N/A 8/25/2021    Procedure: Coronary Angiogram with possible intervention;  Surgeon: David Wilhelm MD;  Location:  [facility]     ENDOSCOPIC RETROGRADE CHOLANGIOPANCREATOGRAM N/A 6/25/2021    Procedure: ENDOSCOPIC RETROGRADE CHOLANGIOPANCREATOGRAPHY with ballon sweep of bile ducts for stones, balloon dilation of bile ducts and bile duct stent placement;  Surgeon: Gregory Gabriel MD;  [facility]     ENDOSCOPIC RETROGRADE CHOLANGIOPANCREATOGRAM N/A 7/22/2021    Procedure: ENDOSCOPIC RETROGRADE CHOLANGIOPANCREATOGRAPHY STONE REMOVAL, DILATION AND STENT PLACEMENT;  Surgeon: Gregory Gabriel MD;  Location:  OR     ENDOSCOPIC RETROGRADE CHOLANGIOPANCREATOGRAPHY, EXCHANGE TUBE/STENT N/A 05/05/2021    Procedure: ENDOSCOPIC RETROGRADE CHOLANGIOPANCREATOGRAPHY WITH STENT EXCHANGE, STONE EXTRACTION, AND DILATION;  Surgeon: Gregory Gabriel MD;  [facility]     ENDOSCOPIC RETROGRADE CHOLANGIOPANCREATOGRAPHY, EXCHANGE TUBE/STENT N/A 5/10/2021    Procedure: ENDOSCOPIC RETROGRADE CHOLANGIOPANCREATOGRAPHY with biliary dilation, stone removal, stent exchange;  Surgeon: Gregory Gabriel MD;  [facility]     ENDOSCOPIC RETROGRADE CHOLANGIOPANCREATOGRAPHY, EXCHANGE TUBE/STENT N/A 6/10/2021    Procedure:  ENDOSCOPIC RETROGRADE CHOLANGIOPANCREATOGRAPHY, WITH biliary stent exchange, stone removal;  Surgeon: Woodrow Lott MD;  [facility]     ENDOSCOPIC RETROGRADE CHOLANGIOPANCREATOGRAPHY, EXCHANGE TUBE/STENT N/A 2021    Procedure: ENDOSCOPIC RETROGRADE CHOLANGIOPANCREATOGRAPHY with biliary dilation, stone removal, stent exchange;  Surgeon: Gregory Gabriel MD;  [facility]     ENDOSCOPIC RETROGRADE CHOLANGIOPANCREATOGRAPHY, EXCHANGE TUBE/STENT N/A 10/1/2021    Procedure: ENDOSCOPIC RETROGRADE CHOLANGIOPANCREATOGRAPHY with balloon sweep of bile ducts, bile duct stent exchanged;  Surgeon: Gregory Gabriel MD;  [facility]     ESOPHAGOSCOPY, GASTROSCOPY, DUODENOSCOPY (EGD), COMBINED N/A 2021    Procedure: ESOPHAGOGASTRODUODENOSCOPY (EGD);  Surgeon: Leventhal, Thomas Michael, MD;  [facility]     ESOPHAGOSCOPY, GASTROSCOPY, DUODENOSCOPY (EGD), COMBINED N/A 10/1/2021    Procedure: ESOPHAGOGASTRODUODENOSCOPY (EGD) with varices banding;  Surgeon: Gregory Gabriel MD;  [facility]     GYN SURGERY      bilat fallopian tubes and ovaries removed     PICC DOUBLE LUMEN PLACEMENT Right 2021    42cm (2cm external), Lateral brachial vein     RETURN LIVER TRANSPLANT N/A 2021    Procedure: CLOSURE, WOUND, ABDOMEN, SECONDARY, ABDOMINAL WASHOUT;  Surgeon: Ernesto Schmitt MD;  [facility]     TRANSPLANT LIVER RECIPIENT  DONOR N/A 2021    Procedure: Opening of abdomen, Abdominal Exploration and aborted liver transplant.;  Surgeon: Ernesto Schmitt MD;  [facility]     TRANSPLANT LIVER RECIPIENT  DONOR N/A 2021    Procedure: TRANSPLANT, LIVER, RECIPIENT,  DONOR;  Surgeon: Ernesto Schmitt MD;  [facility]     Social History     Socioeconomic History     Marital status:      Spouse name: Not on file     Number of children: Not on file     Years of education: Not on file     Highest education level: Not on file   Occupational  History     Not on file   Tobacco Use     Smoking status: Never Smoker     Smokeless tobacco: Never Used   Vaping Use     Vaping Use: Never used   Substance and Sexual Activity     Alcohol use: No     Comment: Last drink was 2017     Drug use: No     Sexual activity: Not Currently     Partners: Male   Other Topics Concern     Parent/sibling w/ CABG, MI or angioplasty before 65F 55M? Not Asked   Social History Narrative     Not on file     Social Determinants of Health     Financial Resource Strain: Not on file   Food Insecurity: Not on file   Transportation Needs: Not on file   Physical Activity: Not on file   Stress: Not on file   Social Connections: Not on file   Intimate Partner Violence: Not on file   Housing Stability: Not on file     Prescription Medications as of 2021       Rx Number Disp Refills Start End Last Dispensed Date Next Fill Date Owning Pharmacy    amoxicillin-clavulanate (AUGMENTIN) 875-125 MG tablet  14 tablet 0 2021    [facility] [id number] [place] [address] [address] [facility] [address]    Sig: Take 1 tablet by mouth 2 times daily    Class: Local Print    Route: Oral    aspirin (ASA) 325 MG tablet  90 tablet 3 2021    [facility] #[id number] [address] AT [facility] 42    Si tablet (325 mg) by Oral or Feeding Tube route daily    Class: E-Prescribe    Route: Oral or Feeding Tube    Guar Gum (FIBER MODULAR, NUTRISOURCE FIBER,) packet  75 packet 0 2021    [facility] [place], [place] - [address] [place] [address]    Si packet by Per Feeding Tube route 3 times daily    Class: No Print Out    Route: Per Feeding Tube    magnesium oxide (MAG-OX) 400 MG tablet  180 tablet 3 2021    [facility] #[id number] [place] [address] [address] [facility] 42    Sig: Take 1 tablet (400 mg) by mouth 2 times daily    Class: E-Prescribe    Route: Oral    mirtazapine  (REMERON) 15 MG tablet  30 tablet 0 2021    [facility] #[id number] [address] [place] [address]    Sig: Take 1 tablet (15 mg) by mouth At Bedtime    Class: E-Prescribe    Route: Oral    multivitamin (CENTRUM SILVER) tablet            Sig: Take 1 tablet by mouth daily    Class: Historical    Route: Oral    mycophenolate (GENERIC EQUIVALENT) 250 MG capsule  540 capsule 3 2021    [facility] #[id number] [address] [place] [address]    Sig: Take 3 capsules (750 mg) by mouth 2 times daily    Class: E-Prescribe    Notes to Pharmacy: TXP DT 2021 (Liver) TXP Dischg DT 2021 DX Liver replaced by transplant Z94.4 [facility] ([place], [place])    Route: Oral    pantoprazole (PROTONIX) 40 MG EC tablet  180 tablet 3 2021    [facility] [address] [place] [address] [address] [place] [address]    Sig: Take 1 tablet (40 mg) by mouth 2 times daily    Class: E-Prescribe    Route: Oral    predniSONE (DELTASONE) 5 MG tablet  90 tablet 3 2021    [facility] [address] [place] [address] [address] [place] [address]    Sig: Take 1 tablet (5 mg) by mouth daily    Class: E-Prescribe    Notes to Pharmacy: TXP DT 2021 (Liver) TXP Dischg DT 2021 DX Liver replaced by transplant Z94.4 [facility] ([place], [place])    Route: Oral    protein modular (PROSOURCE TF) LIQD  30 packet 0 2021    [facility] - [place], [place] - [address] [facility]    Si packet by Per Feeding Tube route daily    Class: E-Prescribe    Route: Per Feeding Tube    sulfamethoxazole-trimethoprim (BACTRIM) 400-80 MG tablet  90 tablet 3 2021    [facility] #[id number] [address]    Sig: Take 1 tablet by  mouth daily    Class: E-Prescribe    Route: Oral    tacrolimus (GENERIC EQUIVALENT) 0.5 MG capsule  60 capsule 1 11/27/2021    [facility] - [place], [place] - [address] [place] [address]    Sig: Take 1 capsule by mouth twice daily as needed for dose titration    Class: E-Prescribe    tacrolimus (GENERIC EQUIVALENT) 1 MG capsule  540 capsule 3 12/10/2021    [facility] #[id number] [address] [place] [address]    Sig: Take 3 capsules (3 mg) by mouth every 12 hours    Class: E-Prescribe    Notes to Pharmacy: Dose change    Route: Oral    ursodiol (ACTIGALL) 300 MG capsule  180 capsule 3 11/30/2021    [facility] [address] [place] [address] [address] [place] [address]    Sig: Take 1 capsule (300 mg) by mouth 2 times daily    Class: E-Prescribe    Route: Oral    valGANciclovir (VALCYTE) 450 MG tablet  90 tablet 3 11/30/2021    [facility] #[id number] [address] [place] [address]    Sig: Take 1 tablet (450 mg) by mouth daily    Class: E-Prescribe    Route: Oral        Diagnostic x-ray materials and Contrast dye   REVIEW OF SYSTEMS (check box if normal)  [x]               GENERAL  [x]                 PULMONARY [x]                GENITOURINARY  [x]                CNS                 [x]                 CARDIAC  [x]                 ENDOCRINE  [x]                EARS,NOSE,THROAT [x]                 GASTROINTESTINAL [x]                 NEUROLOGIC    [x]                MUSCLOSKELTAL  [x]                  HEMATOLOGY      PHYSICAL EXAM (check box if normal)/79   Pulse 95   Wt 54.9 kg (121 lb)   LMP  (LMP Unknown)   SpO2 100%   BMI 20.14 kg/m          [x]            GENERAL:    [x]       EYES:  ICTERIC   []        YES  []                    NO  [x]           EXTREMITIES: Clubbing []       Y     [x]           N    [x]           EARS, NOSE, THROAT: Membranes Moist    YES   [x]                    NO []                  [x]           LUNGS:  CLEAR    YES       [x]                  NO    []                                [x]           SKIN: Jaundice           YES       []                  NO    [x]                   Rash: YES       []                  NO    [x]                                     [x]             HEART: Regular Rate          YES       [x]                  NO    []                   Incision Clean:  YES       [x]                  NO    []                                [x]                    ABDOMEN: Organomegaly YES       []                  NO    [x]                       [x]                    NEUROLOGICAL:  Nonfocal  YES       [x]                  NO    []                       [x]                    Hernia YES       []                  NO    [x]                   PSYCHIATRIC:  Appropriate  YES       [x]                  NO    []                       OTHER:                                                                                                   PAIN SCALE:: 3

## 2021-12-13 NOTE — NURSING NOTE
Chief Complaint   Patient presents with     RECHECK     Liver post op     Blood pressure 127/79, pulse 95, weight 54.9 kg (121 lb), SpO2 100 %, not currently breastfeeding.    Sherry Ellsworth on 12/13/2021 at 10:39 AM

## 2021-12-13 NOTE — LETTER
12/13/2021     RE: Berta Nava  [address] [facility] [id number]    Dear Colleague,    Thank you for referring your patient, Berta Nava, to the [facility] TRANSPLANT [facility]. Please see a copy of my visit note below.    Transplant Surgery -OUTPATIENT IMMUNOSUPPRESSION PROGRESS NOTE    Date of Visit: 12/13/2021    Transplants:  11/18/2021 (Liver); Postoperative day:  25  ASSESMENT AND PLAN:  1.Graft Function: Liver allograft: no rejection or technical problems.    2.Immunosuppression Management: keep tacrolimus levels 5-6 ng/dL; reduce prednisone to 2.5 mg po daily to help wound healing  3.Hypertension: ok  4.Renal Function: better  5.Lab frequency: weekly  6.Other:  Abdominal wound explored: there is a 12 cm sinus on either side of the midline wound. Would recommend CT scan abdomen to evaluate to make sure there is  No fascial dehiscence    Date: December 13, 2021    Transplant:  [x]                             Liver [x]                              Kidney []                             Pancreas []                              Other:             Chief Complaint:  Doing well no fevers on tube feeds    History of Present Illness:  Patient Active Problem List   Diagnosis     Cholangitis     Hypokalemia     Chronic fatigue     Essential hypertension     Hyperlipidemia     Mixed hearing loss, bilateral     Neoplasm of uncertain behavior of skin     PSC (primary sclerosing cholangitis)     Sicca syndrome (H)     Ulcerative pancolitis with complication (H)     Unsatisfactory cervical Papanicolaou smear     Cirrhosis of liver with ascites (H)     Sepsis (H)     SBP (spontaneous bacterial peritonitis) (H)     Choledocholithiasis     Encounter for replacement of biliary stent     Secondary esophageal varices without bleeding (H)     Primary sclerosing cholangitis     Liver transplant planned     Liver transplant candidate     Acute kidney failure, unspecified (H)     Severe protein-calorie  malnutrition (H)     Encephalopathy     Hyperammonemia (H)     Leukocytosis, unspecified type     Hepatic encephalopathy (H)     Chronic liver failure without hepatic coma (H)     Immunosuppressed status (H)     S/P liver transplant (H)     Anemia due to chronic kidney disease     SOCIAL /FAMILY HISTORY: [x]                  No recent change    Past Medical History:   Diagnosis Date     Ascites      Biliary cirrhosis (H)      Cholangitis, sclerosing      Cirrhosis of liver with ascites (H) 3/3/2021     Hearing loss of left ear     wears a hearing aide     History of low potassium      Hyperlipidemia      Hypertension      SBP (spontaneous bacterial peritonitis) (H) 4/30/2021     Sjogren's syndrome (H)      Ulcerative colitis (H)      Ulcerative pancolitis (H)      Past Surgical History:   Procedure Laterality Date     BENCH LIVER N/A 11/18/2021    Procedure: Bench liver;  Surgeon: Ernesto Schmitt MD;  Location:  OR     CV CORONARY ANGIOGRAM N/A 8/25/2021    Procedure: Coronary Angiogram with possible intervention;  Surgeon: David Wilhelm MD;  Location:  HEART CARDIAC CATH LAB     ENDOSCOPIC RETROGRADE CHOLANGIOPANCREATOGRAM N/A 6/25/2021    Procedure: ENDOSCOPIC RETROGRADE CHOLANGIOPANCREATOGRAPHY with ballon sweep of bile ducts for stones, balloon dilation of bile ducts and bile duct stent placement;  Surgeon: Gregory Gabriel MD;  Location:  OR     ENDOSCOPIC RETROGRADE CHOLANGIOPANCREATOGRAM N/A 7/22/2021    Procedure: ENDOSCOPIC RETROGRADE CHOLANGIOPANCREATOGRAPHY STONE REMOVAL, DILATION AND STENT PLACEMENT;  Surgeon: Gregory Gabriel MD;  Location:  OR     ENDOSCOPIC RETROGRADE CHOLANGIOPANCREATOGRAPHY, EXCHANGE TUBE/STENT N/A 05/05/2021    Procedure: ENDOSCOPIC RETROGRADE CHOLANGIOPANCREATOGRAPHY WITH STENT EXCHANGE, STONE EXTRACTION, AND DILATION;  Surgeon: Gregory Gabriel MD;  Location:  OR     ENDOSCOPIC RETROGRADE CHOLANGIOPANCREATOGRAPHY, EXCHANGE TUBE/STENT N/A 5/10/2021    Procedure:  ENDOSCOPIC RETROGRADE CHOLANGIOPANCREATOGRAPHY with biliary dilation, stone removal, stent exchange;  Surgeon: Gregory Gabriel MD;  [facility]     ENDOSCOPIC RETROGRADE CHOLANGIOPANCREATOGRAPHY, EXCHANGE TUBE/STENT N/A 6/10/2021    Procedure: ENDOSCOPIC RETROGRADE CHOLANGIOPANCREATOGRAPHY, WITH biliary stent exchange, stone removal;  Surgeon: Woodrow Lott MD;  Location: [facility]     ENDOSCOPIC RETROGRADE CHOLANGIOPANCREATOGRAPHY, EXCHANGE TUBE/STENT N/A 2021    Procedure: ENDOSCOPIC RETROGRADE CHOLANGIOPANCREATOGRAPHY with biliary dilation, stone removal, stent exchange;  Surgeon: Gregory Gabriel MD;  [facility]     ENDOSCOPIC RETROGRADE CHOLANGIOPANCREATOGRAPHY, EXCHANGE TUBE/STENT N/A 10/1/2021    Procedure: ENDOSCOPIC RETROGRADE CHOLANGIOPANCREATOGRAPHY with balloon sweep of bile ducts, bile duct stent exchanged;  Surgeon: Gregory Gabriel MD;  [facility]     ESOPHAGOSCOPY, GASTROSCOPY, DUODENOSCOPY (EGD), COMBINED N/A 2021    Procedure: ESOPHAGOGASTRODUODENOSCOPY (EGD);  Surgeon: Leventhal, Thomas Michael, MD;  Location: [facility] GI     ESOPHAGOSCOPY, GASTROSCOPY, DUODENOSCOPY (EGD), COMBINED N/A 10/1/2021    Procedure: ESOPHAGOGASTRODUODENOSCOPY (EGD) with varices banding;  Surgeon: Gregory Gabriel MD;  Location[facility]     GYN SURGERY      bilat fallopian tubes and ovaries removed     PICC DOUBLE LUMEN PLACEMENT Right 2021    42cm (2cm external), Lateral brachial vein     RETURN LIVER TRANSPLANT N/A 2021    Procedure: CLOSURE, WOUND, ABDOMEN, SECONDARY, ABDOMINAL WASHOUT;  Surgeon: Ernesto Schmitt MD;  Location[facility]     TRANSPLANT LIVER RECIPIENT  DONOR N/A 2021    Procedure: Opening of abdomen, Abdominal Exploration and aborted liver transplant.;  Surgeon: Ernesto Schmitt MD;  Location[facility]     TRANSPLANT LIVER RECIPIENT  DONOR N/A 2021    Procedure: TRANSPLANT, LIVER, RECIPIENT,  DONOR;  Surgeon: Ernesto Schmitt MD;   Location:  OR     Social History     Socioeconomic History     Marital status:      Spouse name: Not on file     Number of children: Not on file     Years of education: Not on file     Highest education level: Not on file   Occupational History     Not on file   Tobacco Use     Smoking status: Never Smoker     Smokeless tobacco: Never Used   Vaping Use     Vaping Use: Never used   Substance and Sexual Activity     Alcohol use: No     Comment: Last drink was 2017     Drug use: No     Sexual activity: Not Currently     Partners: Male   Other Topics Concern     Parent/sibling w/ CABG, MI or angioplasty before 65F 55M? Not Asked   Social History Narrative     Not on file     Social Determinants of Health     Financial Resource Strain: Not on file   Food Insecurity: Not on file   Transportation Needs: Not on file   Physical Activity: Not on file   Stress: Not on file   Social Connections: Not on file   Intimate Partner Violence: Not on file   Housing Stability: Not on file     Prescription Medications as of 2021       Rx Number Disp Refills Start End Last Dispensed Date Next Fill Date Owning Pharmacy    amoxicillin-clavulanate (AUGMENTIN) 875-125 MG tablet  14 tablet 0 2021    [facility] #[id number] [address] [facility]    Sig: Take 1 tablet by mouth 2 times daily    Class: Local Print    Route: Oral    aspirin (ASA) 325 MG tablet  90 tablet 3 2021    [facility] [address] [place] [address] [address] [facility]    Si tablet (325 mg) by Oral or Feeding Tube route daily    Class: E-Prescribe    Route: Oral or Feeding Tube    Guar Gum (FIBER MODULAR, NUTRISOURCE FIBER,) packet  75 packet 0 2021    [facility] - [place], [place] - [address] [place] [address]    Si packet by Per Feeding Tube route 3 times daily    Class: No Print Out    Route: Per Feeding Tube    magnesium oxide (MAG-OX)  400 MG tablet  180 tablet 3 2021    [facility] #[id number] [place] [address] [address] [place] [address]    Sig: Take 1 tablet (400 mg) by mouth 2 times daily    Class: E-Prescribe    Route: Oral    mirtazapine (REMERON) 15 MG tablet  30 tablet 0 2021    [facility] [address] [place] [address] [address] [place] [address]    Sig: Take 1 tablet (15 mg) by mouth At Bedtime    Class: E-Prescribe    Route: Oral    multivitamin (CENTRUM SILVER) tablet            Sig: Take 1 tablet by mouth daily    Class: Historical    Route: Oral    mycophenolate (GENERIC EQUIVALENT) 250 MG capsule  540 capsule 3 2021    [facility] [address] [place] [address] [address] [place] [address]    Sig: Take 3 capsules (750 mg) by mouth 2 times daily    Class: E-Prescribe    Notes to Pharmacy: TXP DT 2021 (Liver) TXP Dischg DT 2021 DX Liver replaced by transplant Z94.4 [facility] ([place], [place])    Route: Oral    pantoprazole (PROTONIX) 40 MG EC tablet  180 tablet 3 2021    [facility] [address] [place] [address] [address] [place] [address]    Sig: Take 1 tablet (40 mg) by mouth 2 times daily    Class: E-Prescribe    Route: Oral    predniSONE (DELTASONE) 5 MG tablet  90 tablet 3 2021    [facility] [address] [place] [address] [address] [place] [address]    Sig: Take 1 tablet (5 mg) by mouth daily    Class: E-Prescribe    Notes to Pharmacy: TXP DT 2021 (Liver) TXP Dischg DT 2021 DX Liver replaced by transplant Z94.4 [facility] ([place], [place])    Route: Oral    protein modular (PROSOURCE TF) LIQD  30 packet 0 2021    [facility] [place], [place] - [address] [facility]    Si packet by Per Feeding Tube route daily    Class:  E-Prescribe    Route: Per Feeding Tube    sulfamethoxazole-trimethoprim (BACTRIM) 400-80 MG tablet  90 tablet 3 11/30/2021    [facility] [id number] [place] [address] [address] [place] [address]    Sig: Take 1 tablet by mouth daily    Class: E-Prescribe    Route: Oral    tacrolimus (GENERIC EQUIVALENT) 0.5 MG capsule  60 capsule 1 11/27/2021    [facility] [place], [place] - [address] [place] [address]    Sig: Take 1 capsule by mouth twice daily as needed for dose titration    Class: E-Prescribe    tacrolimus (GENERIC EQUIVALENT) 1 MG capsule  540 capsule 3 12/10/2021    [facility] #[id number] [address] [place] [address]    Sig: Take 3 capsules (3 mg) by mouth every 12 hours    Class: E-Prescribe    Notes to Pharmacy: Dose change    Route: Oral    ursodiol (ACTIGALL) 300 MG capsule  180 capsule 3 11/30/2021    [facility] #[id number] [address] [place] [address]    Sig: Take 1 capsule (300 mg) by mouth 2 times daily    Class: E-Prescribe    Route: Oral    valGANciclovir (VALCYTE) 450 MG tablet  90 tablet 3 11/30/2021    [facility] [address] [place] [address] [address] [place] [address]    Sig: Take 1 tablet (450 mg) by mouth daily    Class: E-Prescribe    Route: Oral        Diagnostic x-ray materials and Contrast dye   REVIEW OF SYSTEMS (check box if normal)  [x]               GENERAL  [x]                 PULMONARY [x]                GENITOURINARY  [x]                CNS                 [x]                 CARDIAC  [x]                 ENDOCRINE  [x]                EARS,NOSE,THROAT [x]                 GASTROINTESTINAL [x]                 NEUROLOGIC    [x]                MUSCLOSKELTAL  [x]                  HEMATOLOGY      PHYSICAL EXAM (check box if normal)/79   Pulse 95   Wt 54.9 kg (121 lb)   LMP  (LMP Unknown)   SpO2 100%   BMI 20.14 kg/m           [x]            GENERAL:    [x]       EYES:  ICTERIC   []        YES  []                    NO  [x]           EXTREMITIES: Clubbing []       Y     [x]           N    [x]           EARS, NOSE, THROAT: Membranes Moist    YES   [x]                   NO []                  [x]           LUNGS:  CLEAR    YES       [x]                  NO    []                                [x]           SKIN: Jaundice           YES       []                  NO    [x]                   Rash: YES       []                  NO    [x]                                     [x]             HEART: Regular Rate          YES       [x]                  NO    []                   Incision Clean:  YES       [x]                  NO    []                                [x]                    ABDOMEN: Organomegaly YES       []                  NO    [x]                       [x]                    NEUROLOGICAL:  Nonfocal  YES       [x]                  NO    []                       [x]                    Hernia YES       []                  NO    [x]                   PSYCHIATRIC:  Appropriate  YES       [x]                  NO    []                       OTHER:                                                                                                   PAIN SCALE:: 3    Again, thank you for allowing me to participate in the care of your patient.      Sincerely,    Ernesto Schmitt MD

## 2021-12-13 NOTE — TELEPHONE ENCOUNTER
"Call to Berta to see how clinic visit went w/ Dr. Schmitt.  I also note that she had a CT of her abdomen for evaluate wound.  Message to Dr. Schmitt.  I also see that he decreased her prednisone to 2.5 mg to help with wound healing.  Changed dose in Epic.    Call to patient for general follow-up post liver transplant.    Appetite: getting better, working on eating more protein.  Still getting night time tube feedings.   Diarrhea: none - \"regular poops\"  Nausea: none  Activity: up to a clinic appt today.  Pain management: doing ok  Anxiety / depression:   Physician follow-up:  Lab frequency: every Monday and Thursday.   Med changes: decreased pred to 2.5 mg today    She is concerned about short term disability.  Currently stacey thinks she is returning to work on 12/21.  She doesn't think she would be ready, I totally agree, gave her our fax number.  Karin will need need FMLa too.                     "

## 2021-12-14 DIAGNOSIS — Z94.4 STATUS POST LIVER TRANSPLANTATION (H): ICD-10-CM

## 2021-12-14 RX ORDER — TACROLIMUS 1 MG/1
4 CAPSULE ORAL EVERY 12 HOURS
Qty: 720 CAPSULE | Refills: 3 | Status: SHIPPED | OUTPATIENT
Start: 2021-12-14 | End: 2021-12-17

## 2021-12-14 NOTE — TELEPHONE ENCOUNTER
ISSUE:   Tacrolimus IR level <3 on 12/13, goal 5-6, dose 3 mg BID.    PLAN:   Please call patient and confirm this was an accurate 12-hour trough. Verify Tacrolimus IR dose. Confirm no new medications or illness. Confirm no missed doses. If accurate trough and accurate dose, increase Tacrolimus IR dose to 4 mg BID (ask her to take an extra 1 mg now so that morning dose is 4mg) , encourage lots of fluids and repeat labs on Thursday.   Vanessa Durant RN    OUTCOME:   Spoke with Karin, they confirm accurate trough level and current dose 3 mg BID. Patient confirmed dose change to 4 mg BID but take an extra 1mg this morning, and to repeat labs in 3days. Orders sent to preferred pharmacy for dose change and lab for repeat labs. Karin voiced understanding of plan.     Anne Marie Miranda LPN

## 2021-12-14 NOTE — TELEPHONE ENCOUNTER
Left a message for Karin to have pt change tacro to 4mg BID but take an extra dose this morning and to call with confirmation.

## 2021-12-15 LAB — BACTERIA FLD CULT: NO GROWTH

## 2021-12-15 NOTE — PROGRESS NOTES
Transplant Social Work Services Progress Note      Collaborated with: Liver Transplant Team    Data: Berta is s/p  donor Liver Transplant on 21.   Intervention/Education: I called Berta and we reviewed the followin. Writing to the Donor Family process        2. Liver Transplant Support Group and social events-Berta's email will be added to our distribution list.        3. Immunosuppression copays-no current concerns.  I reminded patient to contact me if she has future concerns and we can evaluate for financial assistance programs, grants, etc.        4. Adjustment to illness-        5. Importance of maintaining abstinence from all non-prescribed drugs and alcohol.  Assessment: Berta is hopeful about her progress post transplant.  She does struggle with her appetite and she remains on tube feeding.  She feels extremely fortunate to have received a liver transplant.  Berta has strong support from her daughter Karin ([employer]) and she has been her primary care giver.    Plan: I will remain available for the psychosocial needs of this patient.         JOSETTE Curiel, [facility]  Liver Transplant   Phone [phone]  Pager [phone]

## 2021-12-16 ENCOUNTER — LAB REQUISITION (OUTPATIENT)
Dept: LAB | Facility: CLINIC | Age: 56
End: 2021-12-16
Payer: COMMERCIAL

## 2021-12-16 ENCOUNTER — TELEPHONE (OUTPATIENT)
Dept: TRANSPLANT | Facility: CLINIC | Age: 56
End: 2021-12-16
Payer: COMMERCIAL

## 2021-12-16 DIAGNOSIS — Z48.23 ENCOUNTER FOR AFTERCARE FOLLOWING LIVER TRANSPLANT (H): ICD-10-CM

## 2021-12-16 DIAGNOSIS — S31.109A OPEN WOUND OF ABDOMEN: Primary | ICD-10-CM

## 2021-12-16 LAB
ALBUMIN SERPL-MCNC: 2.6 G/DL (ref 3.4–5)
ALP SERPL-CCNC: 116 U/L (ref 40–150)
ALT SERPL W P-5'-P-CCNC: 33 U/L (ref 0–50)
ANION GAP SERPL CALCULATED.3IONS-SCNC: 6 MMOL/L (ref 3–14)
AST SERPL W P-5'-P-CCNC: 11 U/L (ref 0–45)
BACTERIA ABSC ANAEROBE+AEROBE CULT: NO GROWTH
BACTERIA FLD CULT: NO GROWTH
BILIRUB DIRECT SERPL-MCNC: 0.3 MG/DL (ref 0–0.2)
BILIRUB SERPL-MCNC: 0.5 MG/DL (ref 0.2–1.3)
BUN SERPL-MCNC: 25 MG/DL (ref 7–30)
CALCIUM SERPL-MCNC: 8.5 MG/DL (ref 8.5–10.1)
CHLORIDE BLD-SCNC: 111 MMOL/L (ref 94–109)
CO2 SERPL-SCNC: 23 MMOL/L (ref 20–32)
CREAT SERPL-MCNC: 0.6 MG/DL (ref 0.52–1.04)
ERYTHROCYTE [DISTWIDTH] IN BLOOD BY AUTOMATED COUNT: 18.8 % (ref 10–15)
GFR SERPL CREATININE-BSD FRML MDRD: >90 ML/MIN/1.73M2
GLUCOSE BLD-MCNC: 89 MG/DL (ref 70–99)
HCT VFR BLD AUTO: 26.2 % (ref 35–47)
HGB BLD-MCNC: 8 G/DL (ref 11.7–15.7)
MAGNESIUM SERPL-MCNC: 2 MG/DL (ref 1.6–2.3)
MCH RBC QN AUTO: 28.8 PG (ref 26.5–33)
MCHC RBC AUTO-ENTMCNC: 30.5 G/DL (ref 31.5–36.5)
MCV RBC AUTO: 94 FL (ref 78–100)
PHOSPHATE SERPL-MCNC: 3.4 MG/DL (ref 2.5–4.5)
PLATELET # BLD AUTO: 747 10E3/UL (ref 150–450)
POTASSIUM BLD-SCNC: 4.5 MMOL/L (ref 3.4–5.3)
PROT SERPL-MCNC: 6.4 G/DL (ref 6.8–8.8)
RBC # BLD AUTO: 2.78 10E6/UL (ref 3.8–5.2)
SODIUM SERPL-SCNC: 140 MMOL/L (ref 133–144)
TACROLIMUS BLD-MCNC: 7.7 UG/L (ref 5–15)
TME LAST DOSE: NORMAL H
TME LAST DOSE: NORMAL H
WBC # BLD AUTO: 8.5 10E3/UL (ref 4–11)

## 2021-12-16 PROCEDURE — 80053 COMPREHEN METABOLIC PANEL: CPT | Mod: ORL | Performed by: TRANSPLANT SURGERY

## 2021-12-16 PROCEDURE — 84100 ASSAY OF PHOSPHORUS: CPT | Mod: ORL | Performed by: TRANSPLANT SURGERY

## 2021-12-16 PROCEDURE — 80197 ASSAY OF TACROLIMUS: CPT | Mod: ORL | Performed by: TRANSPLANT SURGERY

## 2021-12-16 PROCEDURE — 85027 COMPLETE CBC AUTOMATED: CPT | Mod: ORL | Performed by: TRANSPLANT SURGERY

## 2021-12-16 PROCEDURE — 83735 ASSAY OF MAGNESIUM: CPT | Mod: ORL | Performed by: TRANSPLANT SURGERY

## 2021-12-16 PROCEDURE — 82248 BILIRUBIN DIRECT: CPT | Mod: ORL | Performed by: TRANSPLANT SURGERY

## 2021-12-16 NOTE — TELEPHONE ENCOUNTER
"Post Liver Transplant Team Conference  Date: 12/16/2021  Transplant Coordinator: Vanessa Durant  Transplant Surgeon: Ernesto Schmitt      Attendees:  [] Dr. Mackay [] Dr. Traylor []       [x] Dr. Schmitt [x] Anne Marie Miranda LPN []    [] Dr. Gotti [] Andria Rowe NP []    [] Dr. Bernard [] Milady Rodriguez NP []    [] Dr. Wilks [] James  []       [] Dr. Puri [x] Monalisa Bar RN []       [] Dr. Ben Walker [x] Vanessa Durant, RN []       [x] Dr. YOHANNES Rojas [x] Lucy Shabazz RN []       [] Dr. Peterson [x] Lydia Reyes RN []       [] Dr. Blair [] Aaron Lazar RN []         Verbal Plan Read Back:   Was seen in clinic on Monday by Dr. Schmitt, has open incisional wound that needs packing 2x/ day with iodoform gauze.  Per Oskar, wound tunnels about 10 cm to the right and 5 cm to the left.  Prednisone was lowered to 2.5 mg daily.  When tac level is therapeutic can stop prednisone to help w/ wound healing.  Should come to clinic weekly.  When Augmentin is done can stop.   Encourage good nutrition.   Spoke to Berta and Karin.  Sandhya's nurse has been changing dressing on Thursday mornings.  Karin is comfortable doing dressing change.    Karin feels like she has \"turned a corner\".  Weight is up 5 # from discharge, she is working on eating more.  Night time feedings continue.  No new questions or concerns.       Routed to RN Coordinator   Vanessa Durant, RN  "

## 2021-12-17 ENCOUNTER — TELEPHONE (OUTPATIENT)
Dept: TRANSPLANT | Facility: CLINIC | Age: 56
End: 2021-12-17
Payer: COMMERCIAL

## 2021-12-17 DIAGNOSIS — Z94.4 STATUS POST LIVER TRANSPLANTATION (H): ICD-10-CM

## 2021-12-17 DIAGNOSIS — S31.109A OPEN WOUND OF ABDOMEN: ICD-10-CM

## 2021-12-17 RX ORDER — TACROLIMUS 1 MG/1
CAPSULE ORAL
Qty: 630 CAPSULE | Refills: 3 | Status: SHIPPED | OUTPATIENT
Start: 2021-12-17 | End: 2021-12-21

## 2021-12-17 NOTE — TELEPHONE ENCOUNTER
Vanessa ordered Iodoform gauze for packing wound  [facility] does not cover the Rx  Can it get called into [facility] and they will go and pick it up  Give them a call when done

## 2021-12-17 NOTE — TELEPHONE ENCOUNTER
ISSUE:   Tacrolimus IR level 7.7 on 12/16, goal 5-6, dose 4 mg BID. Pt has tremors    PLAN:   Please call patient and confirm this was an accurate 12-hour trough. Verify Tacrolimus IR dose 4 mg BID. Confirm no new medications or illness. Confirm no missed doses. If accurate trough and accurate dose, decrease Tacrolimus IR dose to 4 mg Am and 3 mg PM and repeat labs as scheduled.     OUTCOME:   Spoke with Karin, they confirm accurate trough level and current dose 4 mg BID. Patient confirmed dose change to 4 mg am. 3 mg pm and to repeat labs in 3 days. Orders sent to preferred pharmacy for dose change and lab for repeat labs. Karin voiced understanding of plan.    Anne Marie Miranda LPN

## 2021-12-20 ENCOUNTER — TELEPHONE (OUTPATIENT)
Dept: TRANSPLANT | Facility: CLINIC | Age: 56
End: 2021-12-20

## 2021-12-20 ENCOUNTER — LAB (OUTPATIENT)
Dept: LAB | Facility: CLINIC | Age: 56
End: 2021-12-20
Attending: TRANSPLANT SURGERY
Payer: COMMERCIAL

## 2021-12-20 ENCOUNTER — OFFICE VISIT (OUTPATIENT)
Dept: TRANSPLANT | Facility: CLINIC | Age: 56
End: 2021-12-20
Attending: TRANSPLANT SURGERY
Payer: COMMERCIAL

## 2021-12-20 VITALS
HEART RATE: 89 BPM | DIASTOLIC BLOOD PRESSURE: 82 MMHG | BODY MASS INDEX: 21.9 KG/M2 | OXYGEN SATURATION: 100 % | SYSTOLIC BLOOD PRESSURE: 155 MMHG | WEIGHT: 131.6 LBS

## 2021-12-20 DIAGNOSIS — Z94.4 STATUS POST LIVER TRANSPLANT (H): ICD-10-CM

## 2021-12-20 DIAGNOSIS — Z94.4 S/P LIVER TRANSPLANT (H): ICD-10-CM

## 2021-12-20 DIAGNOSIS — Z94.4 LIVER REPLACED BY TRANSPLANT (H): ICD-10-CM

## 2021-12-20 LAB
ALBUMIN SERPL-MCNC: 2.9 G/DL (ref 3.4–5)
ALP SERPL-CCNC: 88 U/L (ref 40–150)
ALT SERPL W P-5'-P-CCNC: 23 U/L (ref 0–50)
ANION GAP SERPL CALCULATED.3IONS-SCNC: 7 MMOL/L (ref 3–14)
AST SERPL W P-5'-P-CCNC: 12 U/L (ref 0–45)
BILIRUB DIRECT SERPL-MCNC: 0.3 MG/DL (ref 0–0.2)
BILIRUB SERPL-MCNC: 0.4 MG/DL (ref 0.2–1.3)
BUN SERPL-MCNC: 31 MG/DL (ref 7–30)
CALCIUM SERPL-MCNC: 9 MG/DL (ref 8.5–10.1)
CHLORIDE BLD-SCNC: 112 MMOL/L (ref 94–109)
CO2 SERPL-SCNC: 23 MMOL/L (ref 20–32)
CREAT SERPL-MCNC: 0.6 MG/DL (ref 0.52–1.04)
ERYTHROCYTE [DISTWIDTH] IN BLOOD BY AUTOMATED COUNT: 19.1 % (ref 10–15)
GFR SERPL CREATININE-BSD FRML MDRD: >90 ML/MIN/1.73M2
GLUCOSE BLD-MCNC: 88 MG/DL (ref 70–99)
HCT VFR BLD AUTO: 25.5 % (ref 35–47)
HGB BLD-MCNC: 7.6 G/DL (ref 11.7–15.7)
MAGNESIUM SERPL-MCNC: 2 MG/DL (ref 1.6–2.3)
MCH RBC QN AUTO: 28.5 PG (ref 26.5–33)
MCHC RBC AUTO-ENTMCNC: 29.8 G/DL (ref 31.5–36.5)
MCV RBC AUTO: 96 FL (ref 78–100)
PHOSPHATE SERPL-MCNC: 3.9 MG/DL (ref 2.5–4.5)
PLATELET # BLD AUTO: 425 10E3/UL (ref 150–450)
POTASSIUM BLD-SCNC: 4.3 MMOL/L (ref 3.4–5.3)
PROT SERPL-MCNC: 6.3 G/DL (ref 6.8–8.8)
RBC # BLD AUTO: 2.67 10E6/UL (ref 3.8–5.2)
SODIUM SERPL-SCNC: 142 MMOL/L (ref 133–144)
TACROLIMUS BLD-MCNC: 4.7 UG/L (ref 5–15)
TME LAST DOSE: ABNORMAL H
TME LAST DOSE: ABNORMAL H
WBC # BLD AUTO: 7.9 10E3/UL (ref 4–11)

## 2021-12-20 PROCEDURE — 99214 OFFICE O/P EST MOD 30 MIN: CPT | Mod: 24 | Performed by: TRANSPLANT SURGERY

## 2021-12-20 PROCEDURE — 83735 ASSAY OF MAGNESIUM: CPT | Performed by: PATHOLOGY

## 2021-12-20 PROCEDURE — 85027 COMPLETE CBC AUTOMATED: CPT | Performed by: PATHOLOGY

## 2021-12-20 PROCEDURE — 80053 COMPREHEN METABOLIC PANEL: CPT | Performed by: PATHOLOGY

## 2021-12-20 PROCEDURE — 36415 COLL VENOUS BLD VENIPUNCTURE: CPT | Performed by: PATHOLOGY

## 2021-12-20 PROCEDURE — 87521 HEPATITIS C PROBE&RVRS TRNSC: CPT | Performed by: TRANSPLANT SURGERY

## 2021-12-20 PROCEDURE — 82248 BILIRUBIN DIRECT: CPT | Performed by: PATHOLOGY

## 2021-12-20 PROCEDURE — 84100 ASSAY OF PHOSPHORUS: CPT | Performed by: PATHOLOGY

## 2021-12-20 PROCEDURE — 80197 ASSAY OF TACROLIMUS: CPT | Performed by: TRANSPLANT SURGERY

## 2021-12-20 NOTE — PROGRESS NOTES
Transplant Surgery -OUTPATIENT IMMUNOSUPPRESSION PROGRESS NOTE    Date of Visit: 12/20/2021    Transplants:  11/18/2021 (Liver); Postoperative day:  32  ASSESMENT AND PLAN:  1.Graft Function:Liver allograft: no rejection or technical problems.    2.Immunosuppression Management: stop prednisone and keep tacrolimus levels at 6-8 ng/dL  3.Hypertension: ok  4.Renal Function: better  5.Lab frequency: weekly  6.Other:  Wound dehiscence: wear a binder and daily dressings  Eating well, feeding tube removed      Date: December 20, 2021    Transplant:  [x]                             Liver [x]                              Kidney []                             Pancreas []                              Other:             Chief Complaint:  Doing well    History of Present Illness:  Patient Active Problem List   Diagnosis     Cholangitis     Hypokalemia     Chronic fatigue     Essential hypertension     Hyperlipidemia     Mixed hearing loss, bilateral     Neoplasm of uncertain behavior of skin     PSC (primary sclerosing cholangitis)     Sicca syndrome (H)     Ulcerative pancolitis with complication (H)     Unsatisfactory cervical Papanicolaou smear     Cirrhosis of liver with ascites (H)     Sepsis (H)     SBP (spontaneous bacterial peritonitis) (H)     Choledocholithiasis     Encounter for replacement of biliary stent     Secondary esophageal varices without bleeding (H)     Primary sclerosing cholangitis     Liver transplant planned     Liver transplant candidate     Acute kidney failure, unspecified (H)     Severe protein-calorie malnutrition (H)     Encephalopathy     Hyperammonemia (H)     Leukocytosis, unspecified type     Hepatic encephalopathy (H)     Chronic liver failure without hepatic coma (H)     Immunosuppressed status (H)     S/P liver transplant (H)     Anemia due to chronic kidney disease     SOCIAL /FAMILY HISTORY: [x]                  No recent change    Past Medical History:   Diagnosis Date     Ascites       Biliary cirrhosis (H)      Cholangitis, sclerosing      Cirrhosis of liver with ascites (H) 3/3/2021     Hearing loss of left ear     wears a hearing aide     History of low potassium      Hyperlipidemia      Hypertension      SBP (spontaneous bacterial peritonitis) (H) 4/30/2021     Sjogren's syndrome (H)      Ulcerative colitis (H)      Ulcerative pancolitis (H)      Past Surgical History:   Procedure Laterality Date     BENCH LIVER N/A 11/18/2021    Procedure: Bench liver;  Surgeon: Ernesto Schmitt MD;  [facility]     CV CORONARY ANGIOGRAM N/A 8/25/2021    Procedure: Coronary Angiogram with possible intervention;  Surgeon: David Wilhelm MD;  [facility]     ENDOSCOPIC RETROGRADE CHOLANGIOPANCREATOGRAM N/A 6/25/2021    Procedure: ENDOSCOPIC RETROGRADE CHOLANGIOPANCREATOGRAPHY with ballon sweep of bile ducts for stones, balloon dilation of bile ducts and bile duct stent placement;  Surgeon: Gregory Gabriel MD;  Location: [facility]     ENDOSCOPIC RETROGRADE CHOLANGIOPANCREATOGRAM N/A 7/22/2021    Procedure: ENDOSCOPIC RETROGRADE CHOLANGIOPANCREATOGRAPHY STONE REMOVAL, DILATION AND STENT PLACEMENT;  Surgeon: Gregory Gabriel MD;  Location:  OR     ENDOSCOPIC RETROGRADE CHOLANGIOPANCREATOGRAPHY, EXCHANGE TUBE/STENT N/A 05/05/2021    Procedure: ENDOSCOPIC RETROGRADE CHOLANGIOPANCREATOGRAPHY WITH STENT EXCHANGE, STONE EXTRACTION, AND DILATION;  Surgeon: Gregory Gabriel MD;  [facility]     ENDOSCOPIC RETROGRADE CHOLANGIOPANCREATOGRAPHY, EXCHANGE TUBE/STENT N/A 5/10/2021    Procedure: ENDOSCOPIC RETROGRADE CHOLANGIOPANCREATOGRAPHY with biliary dilation, stone removal, stent exchange;  Surgeon: Gregory Gabriel MD;  [facility]     ENDOSCOPIC RETROGRADE CHOLANGIOPANCREATOGRAPHY, EXCHANGE TUBE/STENT N/A 6/10/2021    Procedure: ENDOSCOPIC RETROGRADE CHOLANGIOPANCREATOGRAPHY, WITH biliary stent exchange, stone removal;  Surgeon: Woodrow Lott MD;  [facility]      ENDOSCOPIC RETROGRADE CHOLANGIOPANCREATOGRAPHY, EXCHANGE TUBE/STENT N/A 2021    Procedure: ENDOSCOPIC RETROGRADE CHOLANGIOPANCREATOGRAPHY with biliary dilation, stone removal, stent exchange;  Surgeon: Gregory Gabriel MD;  [facility]     ENDOSCOPIC RETROGRADE CHOLANGIOPANCREATOGRAPHY, EXCHANGE TUBE/STENT N/A 10/1/2021    Procedure: ENDOSCOPIC RETROGRADE CHOLANGIOPANCREATOGRAPHY with balloon sweep of bile ducts, bile duct stent exchanged;  Surgeon: Gregory Gabriel MD;  [facility]     ESOPHAGOSCOPY, GASTROSCOPY, DUODENOSCOPY (EGD), COMBINED N/A 2021    Procedure: ESOPHAGOGASTRODUODENOSCOPY (EGD);  Surgeon: Leventhal, Thomas Michael, MD;  Location: [facility]     ESOPHAGOSCOPY, GASTROSCOPY, DUODENOSCOPY (EGD), COMBINED N/A 10/1/2021    Procedure: ESOPHAGOGASTRODUODENOSCOPY (EGD) with varices banding;  Surgeon: Gregory Gabriel MD;  Location[facility]     GYN SURGERY      bilat fallopian tubes and ovaries removed     PICC DOUBLE LUMEN PLACEMENT Right 2021    42cm (2cm external), Lateral brachial vein     RETURN LIVER TRANSPLANT N/A 2021    Procedure: CLOSURE, WOUND, ABDOMEN, SECONDARY, ABDOMINAL WASHOUT;  Surgeon: Ernesto Schmitt MD;  Location[facility]     TRANSPLANT LIVER RECIPIENT  DONOR N/A 2021    Procedure: Opening of abdomen, Abdominal Exploration and aborted liver transplant.;  Surgeon: Ernesto Schmitt MD;  [facility]     TRANSPLANT LIVER RECIPIENT  DONOR N/A 2021    Procedure: TRANSPLANT, LIVER, RECIPIENT,  DONOR;  Surgeon: Ernesto Schmitt MD;  Location: [facility]     Social History     Socioeconomic History     Marital status:      Spouse name: Not on file     Number of children: Not on file     Years of education: Not on file     Highest education level: Not on file   Occupational History     Not on file   Tobacco Use     Smoking status: Never Smoker     Smokeless tobacco: Never Used   Vaping Use     Vaping Use: Never used    Substance and Sexual Activity     Alcohol use: No     Comment: Last drink was 2017     Drug use: No     Sexual activity: Not Currently     Partners: Male   Other Topics Concern     Parent/sibling w/ CABG, MI or angioplasty before 65F 55M? Not Asked   Social History Narrative     Not on file     Social Determinants of Health     Financial Resource Strain: Not on file   Food Insecurity: Not on file   Transportation Needs: Not on file   Physical Activity: Not on file   Stress: Not on file   Social Connections: Not on file   Intimate Partner Violence: Not on file   Housing Stability: Not on file     Prescription Medications as of 2021       Rx Number Disp Refills Start End Last Dispensed Date Next Fill Date Owning Pharmacy    amoxicillin-clavulanate (AUGMENTIN) 875-125 MG tablet  14 tablet 0 2021    [facility] [address] [place] [address] [address] [place] [address]    Sig: Take 1 tablet by mouth 2 times daily    Class: Local Print    Route: Oral    aspirin (ASA) 325 MG tablet  90 tablet 3 2021    [facility] [place] [address] [address] [place] [address]    Si tablet (325 mg) by Oral or Feeding Tube route daily    Class: E-Prescribe    Route: Oral or Feeding Tube    Gauze Pads & Dressings (CURITY IODOFORM PACKING STRIP) MISC  4 each 3 2021    [facility] [place], [place] - [address]    Sig: Pack abdominal wound twice daily    Class: E-Prescribe    Guar Gum (FIBER MODULAR, NUTRISOURCE FIBER,) packet  60 packet 3 2021    [facility] [place] [address] [address] [place] [address]    Si packets by Per Feeding Tube route 3 times daily (with meals)    Class: E-Prescribe    Route: Per Feeding Tube    Guar Gum (FIBER MODULAR, NUTRISOURCE FIBER,) packet  75 packet 0 2021    [facility] [place], [place] - [address] [facility]     Si packet by Per Feeding Tube route 3 times daily    Class: No Print Out    Route: Per Feeding Tube    magnesium oxide (MAG-OX) 400 MG tablet  180 tablet 3 2021    [facility] [address] [place] [address] [address] [place] [address]    Sig: Take 1 tablet (400 mg) by mouth 2 times daily    Class: E-Prescribe    Route: Oral    mirtazapine (REMERON) 15 MG tablet  30 tablet 0 2021    [facility] [id number] [place] [address] [address] [place] [address]    Sig: Take 1 tablet (15 mg) by mouth At Bedtime    Class: E-Prescribe    Route: Oral    multivitamin (CENTRUM SILVER) tablet            Sig: Take 1 tablet by mouth daily    Class: Historical    Route: Oral    mycophenolate (GENERIC EQUIVALENT) 250 MG capsule  540 capsule 3 2021    [facility] [address] [place] [address] [address] [place] [address]    Sig: Take 3 capsules (750 mg) by mouth 2 times daily    Class: E-Prescribe    Notes to Pharmacy: TXP DT 2021 (Liver) TXP Dischg DT 2021 DX Liver replaced by transplant Z94.4 [facility] ([place], [place])    Route: Oral    pantoprazole (PROTONIX) 40 MG EC tablet  180 tablet 3 2021    [facility] [address] [place] [address] [address] [place] [address]    Sig: Take 1 tablet (40 mg) by mouth 2 times daily    Class: E-Prescribe    Route: Oral    predniSONE (DELTASONE) 5 MG tablet  90 tablet 3 2021        Sig: Take 0.5 tablets (2.5 mg) by mouth daily    Class: Historical    Notes to Pharmacy: TXP DT 2021 (Liver) TXP Dischg DT 2021 DX Liver replaced by transplant Z94.4 [facility] ([place], [place])    Route: Oral    protein modular (PROSOURCE TF) LIQD  30 packet 0 2021    [facility] - [place], [place] - [address] [place] [address]    Si packet by Per  Feeding Tube route daily    Class: E-Prescribe    Route: Per Feeding Tube    sulfamethoxazole-trimethoprim (BACTRIM) 400-80 MG tablet  90 tablet 3 11/30/2021    [id number] [address] [place] [address]    Sig: Take 1 tablet by mouth daily    Class: E-Prescribe    Route: Oral    tacrolimus (GENERIC EQUIVALENT) 0.5 MG capsule  60 capsule 1 11/27/2021    [place], [place] - [address] [place] [address]    Sig: Take 1 capsule by mouth twice daily as needed for dose titration    Class: E-Prescribe    tacrolimus (GENERIC EQUIVALENT) 1 MG capsule  630 capsule 3 12/17/2021    [facility] #[id number] [address] [place] [address]    Sig: Take 4 capsules (4 mg) by mouth every morning AND 3 capsules (3 mg) every evening.    Class: E-Prescribe    Notes to Pharmacy: Dose change    Route: Oral    ursodiol (ACTIGALL) 300 MG capsule  180 capsule 3 11/30/2021    [id number] [address] [place] [address]    Sig: Take 1 capsule (300 mg) by mouth 2 times daily    Class: E-Prescribe    Route: Oral    valGANciclovir (VALCYTE) 450 MG tablet  90 tablet 3 11/30/2021    [id number] [address] [place] [address]    Sig: Take 1 tablet (450 mg) by mouth daily    Class: E-Prescribe    Route: Oral        Diagnostic x-ray materials and Contrast dye   REVIEW OF SYSTEMS (check box if normal)  [x]               GENERAL  [x]                 PULMONARY [x]                GENITOURINARY  [x]                CNS                 [x]                 CARDIAC  [x]                 ENDOCRINE  [x]                EARS,NOSE,THROAT [x]                 GASTROINTESTINAL [x]                 NEUROLOGIC    [x]                MUSCLOSKELTAL  [x]                  HEMATOLOGY      PHYSICAL EXAM (check box if normal)BP (!) 155/82   Pulse 89   Wt  59.7 kg (131 lb 9.6 oz)   LMP  (LMP Unknown)   SpO2 100%   BMI 21.90 kg/m      @txexam@    [x]            GENERAL:    [x]       EYES:  ICTERIC   []        YES  []                    NO  [x]           EXTREMITIES: Clubbing []       Y     [x]           N    [x]           EARS, NOSE, THROAT: Membranes Moist    YES   [x]                   NO []                  [x]           LUNGS:  CLEAR    YES       [x]                  NO    []                                [x]           SKIN: Jaundice           YES       []                  NO    [x]                   Rash: YES       []                  NO    [x]                                     [x]             HEART: Regular Rate          YES       [x]                  NO    []                   Incision Clean:  YES       [x]                  NO    []                                [x]                    ABDOMEN: Organomegaly YES       []                  NO    [x]                       [x]                    NEUROLOGICAL:  Nonfocal  YES       [x]                  NO    []                       [x]                    Hernia YES       []                  NO    [x]                   PSYCHIATRIC:  Appropriate  YES       [x]                  NO    []                       OTHER:                                                                                                   PAIN SCALE:: 3

## 2021-12-20 NOTE — NURSING NOTE
Chief Complaint   Patient presents with     RECHECK     Liver post op     Blood pressure (!) 155/88, pulse 89, weight 59.7 kg (131 lb 9.6 oz), SpO2 100 %, not currently breastfeeding.    Sherry Ellsworth on 12/20/2021 at 10:26 AM

## 2021-12-20 NOTE — TELEPHONE ENCOUNTER
Called Berta to celebrate the fact that her feeding tube was removed!   Dr. Schmitt also stopped prednisone.  She will continue packing her wound.

## 2021-12-20 NOTE — LETTER
12/20/2021    RE: Berta Nava  [address] [facility] [address]    Dear Colleague,    Thank you for referring your patient, Berta Nava, to the [facility]PL[facility]. Please see a copy of my visit note below.    Transplant Surgery -OUTPATIENT IMMUNOSUPPRESSION PROGRESS NOTE    Date of Visit: 12/20/2021    Transplants:  11/18/2021 (Liver); Postoperative day:  32  ASSESMENT AND PLAN:  1.Graft Function:Liver allograft: no rejection or technical problems.    2.Immunosuppression Management: stop prednisone and keep tacrolimus levels at 6-8 ng/dL  3.Hypertension: ok  4.Renal Function: better  5.Lab frequency: weekly  6.Other:  Wound dehiscence: wear a binder and daily dressings  Eating well, feeding tube removed      Date: December 20, 2021    Transplant:  [x]                             Liver [x]                              Kidney []                             Pancreas []                              Other:             Chief Complaint:  Doing well    History of Present Illness:  Patient Active Problem List   Diagnosis     Cholangitis     Hypokalemia     Chronic fatigue     Essential hypertension     Hyperlipidemia     Mixed hearing loss, bilateral     Neoplasm of uncertain behavior of skin     PSC (primary sclerosing cholangitis)     Sicca syndrome (H)     Ulcerative pancolitis with complication (H)     Unsatisfactory cervical Papanicolaou smear     Cirrhosis of liver with ascites (H)     Sepsis (H)     SBP (spontaneous bacterial peritonitis) (H)     Choledocholithiasis     Encounter for replacement of biliary stent     Secondary esophageal varices without bleeding (H)     Primary sclerosing cholangitis     Liver transplant planned     Liver transplant candidate     Acute kidney failure, unspecified (H)     Severe protein-calorie malnutrition (H)     Encephalopathy     Hyperammonemia (H)     Leukocytosis, unspecified type     Hepatic encephalopathy (H)     Chronic liver failure  without hepatic coma (H)     Immunosuppressed status (H)     S/P liver transplant (H)     Anemia due to chronic kidney disease     SOCIAL /FAMILY HISTORY: [x]                  No recent change    Past Medical History:   Diagnosis Date     Ascites      Biliary cirrhosis (H)      Cholangitis, sclerosing      Cirrhosis of liver with ascites (H) 3/3/2021     Hearing loss of left ear     wears a hearing aide     History of low potassium      Hyperlipidemia      Hypertension      SBP (spontaneous bacterial peritonitis) (H) 4/30/2021     Sjogren's syndrome (H)      Ulcerative colitis (H)      Ulcerative pancolitis (H)      Past Surgical History:   Procedure Laterality Date     BENCH LIVER N/A 11/18/2021    Procedure: Bench liver;  Surgeon: Ernesto Schmitt MD;  Location: [facility] OR     CV CORONARY ANGIOGRAM N/A 8/25/2021    Procedure: Coronary Angiogram with possible intervention;  Surgeon: David Wilhelm MD;  Location:  HEART CARDIAC CATH LAB     ENDOSCOPIC RETROGRADE CHOLANGIOPANCREATOGRAM N/A 6/25/2021    Procedure: ENDOSCOPIC RETROGRADE CHOLANGIOPANCREATOGRAPHY with ballon sweep of bile ducts for stones, balloon dilation of bile ducts and bile duct stent placement;  Surgeon: Gregory Gabriel MD;  Location:  OR     ENDOSCOPIC RETROGRADE CHOLANGIOPANCREATOGRAM N/A 7/22/2021    Procedure: ENDOSCOPIC RETROGRADE CHOLANGIOPANCREATOGRAPHY STONE REMOVAL, DILATION AND STENT PLACEMENT;  Surgeon: Gregory Gabriel MD;  Location:  OR     ENDOSCOPIC RETROGRADE CHOLANGIOPANCREATOGRAPHY, EXCHANGE TUBE/STENT N/A 05/05/2021    Procedure: ENDOSCOPIC RETROGRADE CHOLANGIOPANCREATOGRAPHY WITH STENT EXCHANGE, STONE EXTRACTION, AND DILATION;  Surgeon: Gregory Gabriel MD;  Location:  OR     ENDOSCOPIC RETROGRADE CHOLANGIOPANCREATOGRAPHY, EXCHANGE TUBE/STENT N/A 5/10/2021    Procedure: ENDOSCOPIC RETROGRADE CHOLANGIOPANCREATOGRAPHY with biliary dilation, stone removal, stent exchange;  Surgeon: Gregory Gabriel MD;  Location:  OR      ENDOSCOPIC RETROGRADE CHOLANGIOPANCREATOGRAPHY, EXCHANGE TUBE/STENT N/A 6/10/2021    Procedure: ENDOSCOPIC RETROGRADE CHOLANGIOPANCREATOGRAPHY, WITH biliary stent exchange, stone removal;  Surgeon: Woodrow Ltot MD;  [facility]     ENDOSCOPIC RETROGRADE CHOLANGIOPANCREATOGRAPHY, EXCHANGE TUBE/STENT N/A 2021    Procedure: ENDOSCOPIC RETROGRADE CHOLANGIOPANCREATOGRAPHY with biliary dilation, stone removal, stent exchange;  Surgeon: Gregory Gabriel MD;  [facility]     ENDOSCOPIC RETROGRADE CHOLANGIOPANCREATOGRAPHY, EXCHANGE TUBE/STENT N/A 10/1/2021    Procedure: ENDOSCOPIC RETROGRADE CHOLANGIOPANCREATOGRAPHY with balloon sweep of bile ducts, bile duct stent exchanged;  Surgeon: Gregory Gabriel MD;  [facility]     ESOPHAGOSCOPY, GASTROSCOPY, DUODENOSCOPY (EGD), COMBINED N/A 2021    Procedure: ESOPHAGOGASTRODUODENOSCOPY (EGD);  Surgeon: Leventhal, Thomas Michael, MD;  [facility]     ESOPHAGOSCOPY, GASTROSCOPY, DUODENOSCOPY (EGD), COMBINED N/A 10/1/2021    Procedure: ESOPHAGOGASTRODUODENOSCOPY (EGD) with varices banding;  Surgeon: Gregory Gabriel MD;  [facility]     GYN SURGERY      bilat fallopian tubes and ovaries removed     PICC DOUBLE LUMEN PLACEMENT Right 2021    42cm (2cm external), Lateral brachial vein     RETURN LIVER TRANSPLANT N/A 2021    Procedure: CLOSURE, WOUND, ABDOMEN, SECONDARY, ABDOMINAL WASHOUT;  Surgeon: Ernesto Schmitt MD;  [facility]     TRANSPLANT LIVER RECIPIENT  DONOR N/A 2021    Procedure: Opening of abdomen, Abdominal Exploration and aborted liver transplant.;  Surgeon: Ernesto Schmitt MD;  [facility]     TRANSPLANT LIVER RECIPIENT  DONOR N/A 2021    Procedure: TRANSPLANT, LIVER, RECIPIENT,  DONOR;  Surgeon: Ernesto Schmitt MD;  [facility]     Social History     Socioeconomic History     Marital status:      Spouse name: Not on file     Number of children: Not on file      Years of education: Not on file     Highest education level: Not on file   Occupational History     Not on file   Tobacco Use     Smoking status: Never Smoker     Smokeless tobacco: Never Used   Vaping Use     Vaping Use: Never used   Substance and Sexual Activity     Alcohol use: No     Comment: Last drink was 2017     Drug use: No     Sexual activity: Not Currently     Partners: Male   Other Topics Concern     Parent/sibling w/ CABG, MI or angioplasty before 65F 55M? Not Asked   Social History Narrative     Not on file     Social Determinants of Health     Financial Resource Strain: Not on file   Food Insecurity: Not on file   Transportation Needs: Not on file   Physical Activity: Not on file   Stress: Not on file   Social Connections: Not on file   Intimate Partner Violence: Not on file   Housing Stability: Not on file     Prescription Medications as of 2021       Rx Number Disp Refills Start End Last Dispensed Date Next Fill Date Owning Pharmacy    amoxicillin-clavulanate (AUGMENTIN) 875-125 MG tablet  14 tablet 0 2021    [facility] [address] [place] [address] [address] [place] [address]    Sig: Take 1 tablet by mouth 2 times daily    Class: Local Print    Route: Oral    aspirin (ASA) 325 MG tablet  90 tablet 3 2021    [facility] [address] [place] [address] [address] [place] [address]    Si tablet (325 mg) by Oral or Feeding Tube route daily    Class: E-Prescribe    Route: Oral or Feeding Tube    Gauze Pads & Dressings (CURITY IODOFORM PACKING STRIP) MISC  4 each 3 2021    [facility] [address] [employer]    Sig: Pack abdominal wound twice daily    Class: E-Prescribe    Guar Gum (FIBER MODULAR, NUTRISOURCE FIBER,) packet  60 packet 3 2021    [facility] [address] [place] [address] [address] [place] [address]    Si packets by Per Feeding Tube  route 3 times daily (with meals)    Class: E-Prescribe    Route: Per Feeding Tube    Guar Gum (FIBER MODULAR, NUTRISOURCE FIBER,) packet  75 packet 0 2021    [facility] [place], [place] - [address] [place] [address]    Si packet by Per Feeding Tube route 3 times daily    Class: No Print Out    Route: Per Feeding Tube    magnesium oxide (MAG-OX) 400 MG tablet  180 tablet 3 2021    [facility] [address] [place] [address] [address] [place] [address]    Sig: Take 1 tablet (400 mg) by mouth 2 times daily    Class: E-Prescribe    Route: Oral    mirtazapine (REMERON) 15 MG tablet  30 tablet 0 2021    [facility] [address] [place] [address] [address] [place] [address]    Sig: Take 1 tablet (15 mg) by mouth At Bedtime    Class: E-Prescribe    Route: Oral    multivitamin (CENTRUM SILVER) tablet            Sig: Take 1 tablet by mouth daily    Class: Historical    Route: Oral    mycophenolate (GENERIC EQUIVALENT) 250 MG capsule  540 capsule 3 2021    [facility] [address] [place] [address] [address] [place] [address]    Sig: Take 3 capsules (750 mg) by mouth 2 times daily    Class: E-Prescribe    Notes to Pharmacy: TXP DT 2021 (Liver) TXP Dischg DT 2021 DX Liver replaced by transplant Z94.4 TX Center [facility] ([place], [place])    Route: Oral    pantoprazole (PROTONIX) 40 MG EC tablet  180 tablet 3 2021    [facility] [address] [place] [address] [address] [place] [address]    Sig: Take 1 tablet (40 mg) by mouth 2 times daily    Class: E-Prescribe    Route: Oral    predniSONE (DELTASONE) 5 MG tablet  90 tablet 3 2021        Sig: Take 0.5 tablets (2.5 mg) by mouth daily    Class: Historical    Notes to Pharmacy: TXP DT 2021 (Liver) TXP Dischg DT 2021 DX Liver replaced by transplant Z94.4 TX Center  [facility] ([place], [place])    Route: Oral    protein modular (PROSOURCE TF) LIQD  30 packet 0 2021    [facility] [address]    Si packet by Per Feeding Tube route daily    Class: E-Prescribe    Route: Per Feeding Tube    sulfamethoxazole-trimethoprim (BACTRIM) 400-80 MG tablet  90 tablet 3 2021    [facility] [id number] [place] [address] [address] [place] [address]    Sig: Take 1 tablet by mouth daily    Class: E-Prescribe    Route: Oral    tacrolimus (GENERIC EQUIVALENT) 0.5 MG capsule  60 capsule 1 2021    [facility] [address]    Sig: Take 1 capsule by mouth twice daily as needed for dose titration    Class: E-Prescribe    tacrolimus (GENERIC EQUIVALENT) 1 MG capsule  630 capsule 3 2021    [facility] #[id number] [place] [address] [address] [place] [address]    Sig: Take 4 capsules (4 mg) by mouth every morning AND 3 capsules (3 mg) every evening.    Class: E-Prescribe    Notes to Pharmacy: Dose change    Route: Oral    ursodiol (ACTIGALL) 300 MG capsule  180 capsule 3 2021    [facility] #[id number] [place] [address] [address] [place] [address]    Sig: Take 1 capsule (300 mg) by mouth 2 times daily    Class: E-Prescribe    Route: Oral    valGANciclovir (VALCYTE) 450 MG tablet  90 tablet 3 2021    [facility] [address] [place] [address] [address] [place] [address]    Sig: Take 1 tablet (450 mg) by mouth daily    Class: E-Prescribe    Route: Oral        Diagnostic x-ray materials and Contrast dye   REVIEW OF SYSTEMS (check box if normal)  [x]               GENERAL  [x]                 PULMONARY [x]                GENITOURINARY  [x]                CNS                 [x]                 CARDIAC  [x]                 ENDOCRINE  [x]                 EARS,NOSE,THROAT [x]                 GASTROINTESTINAL [x]                 NEUROLOGIC    [x]                MUSCLOSKELTAL  [x]                  HEMATOLOGY      PHYSICAL EXAM (check box if normal)BP (!) 155/82   Pulse 89   Wt 59.7 kg (131 lb 9.6 oz)   LMP  (LMP Unknown)   SpO2 100%   BMI 21.90 kg/m      @txexam@    [x]            GENERAL:    [x]       EYES:  ICTERIC   []        YES  []                    NO  [x]           EXTREMITIES: Clubbing []       Y     [x]           N    [x]           EARS, NOSE, THROAT: Membranes Moist    YES   [x]                   NO []                  [x]           LUNGS:  CLEAR    YES       [x]                  NO    []                                [x]           SKIN: Jaundice           YES       []                  NO    [x]                   Rash: YES       []                  NO    [x]                                     [x]             HEART: Regular Rate          YES       [x]                  NO    []                   Incision Clean:  YES       [x]                  NO    []                                [x]                    ABDOMEN: Organomegaly YES       []                  NO    [x]                       [x]                    NEUROLOGICAL:  Nonfocal  YES       [x]                  NO    []                       [x]                    Hernia YES       []                  NO    [x]                   PSYCHIATRIC:  Appropriate  YES       [x]                  NO    []                       OTHER:                                                                                                   PAIN SCALE:: 3    Again, thank you for allowing me to participate in the care of your patient.      Sincerely,    Ernesto Schmitt MD

## 2021-12-21 ENCOUNTER — TELEPHONE (OUTPATIENT)
Dept: TRANSPLANT | Facility: CLINIC | Age: 56
End: 2021-12-21
Payer: COMMERCIAL

## 2021-12-21 ENCOUNTER — TELEPHONE (OUTPATIENT)
Dept: INTERNAL MEDICINE | Facility: CLINIC | Age: 56
End: 2021-12-21
Payer: COMMERCIAL

## 2021-12-21 DIAGNOSIS — Z94.4 STATUS POST LIVER TRANSPLANTATION (H): ICD-10-CM

## 2021-12-21 LAB
HBV DNA SERPL QL NAA+PROBE: NORMAL
HCV RNA SERPL QL NAA+PROBE: NORMAL
HIV1+2 RNA SERPL QL NAA+PROBE: NORMAL

## 2021-12-21 RX ORDER — TACROLIMUS 1 MG/1
4 CAPSULE ORAL 2 TIMES DAILY
Qty: 720 CAPSULE | Refills: 3 | Status: SHIPPED | OUTPATIENT
Start: 2021-12-21 | End: 2021-12-27

## 2021-12-21 NOTE — TELEPHONE ENCOUNTER
ISSUE:   Tacrolimus IR level 4.7 on 12/20, goal 6-8, dose 4 mg in the morning and 3 mg in the evening.      PLAN:   Please call patient and confirm this was an accurate 12-hour trough. Verify Tacrolimus IR dose. Confirm no new medications or illness. Confirm no missed doses. If accurate trough and accurate dose, increase Tacrolimus IR dose to 4 mg BID and repeat labs on Thursday.  Vanessa Durant RN  OUTCOME:   See other encounter.  I spoke w/ Berta about dose change.  She will increase to 4 mg po bid and drink plenty of fluids.

## 2021-12-21 NOTE — TELEPHONE ENCOUNTER
Fax received from [facility] 11/28/21 for review and signature.  Put in Tila Nevarez's in basket.

## 2021-12-21 NOTE — TELEPHONE ENCOUNTER
Received from from [place] for medical leave.  Called Berta to let her know I will fax this in tomorrow. Also spoke to her about her tac level, dose increase to 4 mg po bid, encouraged hydration.

## 2021-12-22 ENCOUNTER — HOME INFUSION (PRE-WILLOW HOME INFUSION) (OUTPATIENT)
Dept: PHARMACY | Facility: CLINIC | Age: 56
End: 2021-12-22
Payer: COMMERCIAL

## 2021-12-22 ENCOUNTER — TELEPHONE (OUTPATIENT)
Dept: TRANSPLANT | Facility: CLINIC | Age: 56
End: 2021-12-22
Payer: COMMERCIAL

## 2021-12-23 ENCOUNTER — TELEPHONE (OUTPATIENT)
Dept: INTERNAL MEDICINE | Facility: CLINIC | Age: 56
End: 2021-12-23

## 2021-12-23 ENCOUNTER — OFFICE VISIT (OUTPATIENT)
Dept: INTERNAL MEDICINE | Facility: CLINIC | Age: 56
End: 2021-12-23
Payer: COMMERCIAL

## 2021-12-23 ENCOUNTER — LAB REQUISITION (OUTPATIENT)
Dept: LAB | Facility: CLINIC | Age: 56
End: 2021-12-23
Payer: COMMERCIAL

## 2021-12-23 VITALS
DIASTOLIC BLOOD PRESSURE: 70 MMHG | OXYGEN SATURATION: 100 % | WEIGHT: 134 LBS | RESPIRATION RATE: 20 BRPM | TEMPERATURE: 97.8 F | SYSTOLIC BLOOD PRESSURE: 122 MMHG | HEART RATE: 91 BPM | BODY MASS INDEX: 22.3 KG/M2

## 2021-12-23 DIAGNOSIS — Z94.4 S/P LIVER TRANSPLANT (H): Primary | ICD-10-CM

## 2021-12-23 DIAGNOSIS — J00 ACUTE RHINITIS: ICD-10-CM

## 2021-12-23 DIAGNOSIS — Z53.9 DIAGNOSIS NOT YET DEFINED: Primary | ICD-10-CM

## 2021-12-23 DIAGNOSIS — Z94.4 LIVER TRANSPLANT STATUS (H): ICD-10-CM

## 2021-12-23 LAB
ALBUMIN SERPL-MCNC: 2.8 G/DL (ref 3.4–5)
ALP SERPL-CCNC: 93 U/L (ref 40–150)
ALT SERPL W P-5'-P-CCNC: 19 U/L (ref 0–50)
ANION GAP SERPL CALCULATED.3IONS-SCNC: 10 MMOL/L (ref 3–14)
AST SERPL W P-5'-P-CCNC: 9 U/L (ref 0–45)
BILIRUB DIRECT SERPL-MCNC: 0.3 MG/DL (ref 0–0.2)
BILIRUB SERPL-MCNC: 0.4 MG/DL (ref 0.2–1.3)
BUN SERPL-MCNC: 19 MG/DL (ref 7–30)
CALCIUM SERPL-MCNC: 8.6 MG/DL (ref 8.5–10.1)
CHLORIDE BLD-SCNC: 111 MMOL/L (ref 94–109)
CO2 SERPL-SCNC: 21 MMOL/L (ref 20–32)
CREAT SERPL-MCNC: 0.62 MG/DL (ref 0.52–1.04)
ERYTHROCYTE [DISTWIDTH] IN BLOOD BY AUTOMATED COUNT: 18.6 % (ref 10–15)
GFR SERPL CREATININE-BSD FRML MDRD: >90 ML/MIN/1.73M2
GLUCOSE BLD-MCNC: 80 MG/DL (ref 70–99)
HCT VFR BLD AUTO: 27.7 % (ref 35–47)
HGB BLD-MCNC: 8 G/DL (ref 11.7–15.7)
MAGNESIUM SERPL-MCNC: 1.8 MG/DL (ref 1.6–2.3)
MCH RBC QN AUTO: 27.7 PG (ref 26.5–33)
MCHC RBC AUTO-ENTMCNC: 28.9 G/DL (ref 31.5–36.5)
MCV RBC AUTO: 96 FL (ref 78–100)
PHOSPHATE SERPL-MCNC: 3.8 MG/DL (ref 2.5–4.5)
PLATELET # BLD AUTO: 569 10E3/UL (ref 150–450)
POTASSIUM BLD-SCNC: 3.6 MMOL/L (ref 3.4–5.3)
PROT SERPL-MCNC: 6.5 G/DL (ref 6.8–8.8)
RBC # BLD AUTO: 2.89 10E6/UL (ref 3.8–5.2)
SODIUM SERPL-SCNC: 142 MMOL/L (ref 133–144)
TACROLIMUS BLD-MCNC: 5.6 UG/L (ref 5–15)
TME LAST DOSE: NORMAL H
TME LAST DOSE: NORMAL H
WBC # BLD AUTO: 5.9 10E3/UL (ref 4–11)

## 2021-12-23 PROCEDURE — 99213 OFFICE O/P EST LOW 20 MIN: CPT | Performed by: NURSE PRACTITIONER

## 2021-12-23 PROCEDURE — 80053 COMPREHEN METABOLIC PANEL: CPT | Mod: ORL | Performed by: NURSE PRACTITIONER

## 2021-12-23 PROCEDURE — 82248 BILIRUBIN DIRECT: CPT | Mod: ORL | Performed by: NURSE PRACTITIONER

## 2021-12-23 PROCEDURE — 85027 COMPLETE CBC AUTOMATED: CPT | Mod: ORL | Performed by: NURSE PRACTITIONER

## 2021-12-23 PROCEDURE — 83735 ASSAY OF MAGNESIUM: CPT | Mod: ORL | Performed by: NURSE PRACTITIONER

## 2021-12-23 PROCEDURE — U0005 INFEC AGEN DETEC AMPLI PROBE: HCPCS | Performed by: NURSE PRACTITIONER

## 2021-12-23 PROCEDURE — G0180 MD CERTIFICATION HHA PATIENT: HCPCS | Performed by: NURSE PRACTITIONER

## 2021-12-23 PROCEDURE — 84100 ASSAY OF PHOSPHORUS: CPT | Mod: ORL | Performed by: NURSE PRACTITIONER

## 2021-12-23 PROCEDURE — 80197 ASSAY OF TACROLIMUS: CPT | Mod: ORL | Performed by: NURSE PRACTITIONER

## 2021-12-23 PROCEDURE — U0003 INFECTIOUS AGENT DETECTION BY NUCLEIC ACID (DNA OR RNA); SEVERE ACUTE RESPIRATORY SYNDROME CORONAVIRUS 2 (SARS-COV-2) (CORONAVIRUS DISEASE [COVID-19]), AMPLIFIED PROBE TECHNIQUE, MAKING USE OF HIGH THROUGHPUT TECHNOLOGIES AS DESCRIBED BY CMS-2020-01-R: HCPCS | Performed by: NURSE PRACTITIONER

## 2021-12-23 NOTE — TELEPHONE ENCOUNTER
[facility] now requests orders and shares plan of care/discharge summaries for some patients through [facility].  Please REPLY TO THIS MESSAGE OR ROUTE BACK TO THE AUTHOR in order to give authorization for orders when needed.  This is considered a verbal order, you will still receive a faxed copy of orders for signature.  Thank you for your assistance in improving collaboration for our patients.    ORDER  Change in visit frequency, Pt is declining the Monday SN 12/27/21 visit due to going into clinic for labs that day.    Will decrease SN visits to 1x/wk for the week of 12/27/21.      Thank you,   Randi Pimentel RN Clinical Supervisor  [facility]  Email: [email or web address]  Office: [phone] ext [phone]  Work Cell: [phone]

## 2021-12-23 NOTE — PROGRESS NOTES
Assessment & Plan     S/P liver transplant (H)      Acute rhinitis    - Asymptomatic COVID-19 Virus (Coronavirus) by PCR; Future  - Asymptomatic COVID-19 Virus (Coronavirus) by PCR Nasopharyngeal    Continue weekly f/u transplant team and labs  covid test pending             Tila Nevarez NP  [facility] [place] is a 56 year old who presents for the following health issues     HPI       Hospital Follow-up Visit:    Hospital/Nursing Home/ Rehab Facility: [facility]  Date of Admission: 11/17/21  Date of Discharge: 11/27/21  Reason(s) for Admission: Liver transplant       Was your hospitalization related to COVID-19? No   Problems taking medications regularly:  None  Medication changes since discharge: tacrolimus changes per labs   Problems adhering to non-medication therapy:  None    Summary of hospitalization:  [facility] discharge summary reviewed  Diagnostic Tests/Treatments reviewed.  Follow up needed: transplant team and labs  Other Healthcare Providers Involved in Patient s Care:         Specialist appointment - weekly  Update since discharge: stable. Post Discharge Medication Reconciliation: discharge medications reconciled, continue medications without change.  Plan of care communicated with patient              Review of Systems   CONSTITUTIONAL: NEGATIVE for fever, chills, change in weight  ENT/MOUTH: POSITIVE for rhinorrhea-clear  RESP: NEGATIVE for significant cough or SOB  CV: NEGATIVE for chest pain, palpitations or peripheral edema  GI: NEGATIVE for nausea, abdominal pain, heartburn, or change in bowel habits  PSYCHIATRIC: NEGATIVE for changes in mood or affect      Objective    /70   Pulse 91   Temp 97.8  F (36.6  C) (Oral)   Resp 20   Wt 60.8 kg (134 lb)   LMP  (LMP Unknown)   SpO2 100%   BMI 22.30 kg/m      Physical Exam   GENERAL: alert, no distress and frail  EYES: Eyes  grossly normal to inspection, PERRL and conjunctivae and sclerae normal  RESP: lungs clear to auscultation - no rales, rhonchi or wheezes  CV: regular rate and rhythm, normal S1 S2, no S3 or S4, no murmur, click or rub, no peripheral edema and peripheral pulses strong  ABDOMEN: soft, nontender, no hepatosplenomegaly, no masses and bowel sounds normal  PSYCH: mentation appears normal, affect normal/bright

## 2021-12-24 DIAGNOSIS — Z94.4 STATUS POST LIVER TRANSPLANTATION (H): ICD-10-CM

## 2021-12-24 LAB — SARS-COV-2 RNA RESP QL NAA+PROBE: NEGATIVE

## 2021-12-24 NOTE — TELEPHONE ENCOUNTER
ISSUE:   Tacrolimus IR level 5.6 on 12/23, goal 6-8 dose 4 mg BID.    PLAN:   Please call patient and confirm this was an accurate 12-hour trough. Verify Tacrolimus IR dose4 mg BID. Confirm no new medications or illness. Confirm no missed doses. If accurate trough and accurate dose, increase Tacrolimus IR dose to 4 mg AM and 5 mg PM and repeat labs on Monday.    OUTCOME:   Spoke with patient, they confirm accurate trough level and current dose 4 mg BID. Patient confirmed dose change to 4 mg am, 5 mg pm and to repeat labs in 3 days. Orders sent to preferred pharmacy for dose change and lab for repeat labs. Patient voiced understanding of plan.     Anne Marie Miranda LPN

## 2021-12-27 ENCOUNTER — LAB (OUTPATIENT)
Dept: LAB | Facility: CLINIC | Age: 56
End: 2021-12-27
Attending: TRANSPLANT SURGERY
Payer: COMMERCIAL

## 2021-12-27 ENCOUNTER — OFFICE VISIT (OUTPATIENT)
Dept: TRANSPLANT | Facility: CLINIC | Age: 56
End: 2021-12-27
Attending: TRANSPLANT SURGERY
Payer: COMMERCIAL

## 2021-12-27 VITALS
HEART RATE: 82 BPM | SYSTOLIC BLOOD PRESSURE: 164 MMHG | DIASTOLIC BLOOD PRESSURE: 84 MMHG | BODY MASS INDEX: 22.58 KG/M2 | WEIGHT: 135.7 LBS | OXYGEN SATURATION: 100 %

## 2021-12-27 DIAGNOSIS — R63.0 POOR APPETITE: ICD-10-CM

## 2021-12-27 DIAGNOSIS — Z94.4 LIVER REPLACED BY TRANSPLANT (H): ICD-10-CM

## 2021-12-27 DIAGNOSIS — R18.8 CIRRHOSIS OF LIVER WITH ASCITES, UNSPECIFIED HEPATIC CIRRHOSIS TYPE (H): ICD-10-CM

## 2021-12-27 DIAGNOSIS — Z94.4 STATUS POST LIVER TRANSPLANT (H): ICD-10-CM

## 2021-12-27 DIAGNOSIS — K74.60 CIRRHOSIS OF LIVER WITH ASCITES, UNSPECIFIED HEPATIC CIRRHOSIS TYPE (H): ICD-10-CM

## 2021-12-27 LAB
AFP SERPL-MCNC: 3.6 UG/L (ref 0–8)
ALBUMIN SERPL-MCNC: 3.1 G/DL (ref 3.4–5)
ALP SERPL-CCNC: 90 U/L (ref 40–150)
ALT SERPL W P-5'-P-CCNC: 15 U/L (ref 0–50)
ANION GAP SERPL CALCULATED.3IONS-SCNC: 8 MMOL/L (ref 3–14)
AST SERPL W P-5'-P-CCNC: 9 U/L (ref 0–45)
BILIRUB DIRECT SERPL-MCNC: 0.2 MG/DL (ref 0–0.2)
BILIRUB SERPL-MCNC: 0.4 MG/DL (ref 0.2–1.3)
BUN SERPL-MCNC: 14 MG/DL (ref 7–30)
CALCIUM SERPL-MCNC: 9 MG/DL (ref 8.5–10.1)
CHLORIDE BLD-SCNC: 115 MMOL/L (ref 94–109)
CO2 SERPL-SCNC: 20 MMOL/L (ref 20–32)
CREAT SERPL-MCNC: 0.69 MG/DL (ref 0.52–1.04)
ERYTHROCYTE [DISTWIDTH] IN BLOOD BY AUTOMATED COUNT: 18.3 % (ref 10–15)
GFR SERPL CREATININE-BSD FRML MDRD: >90 ML/MIN/1.73M2
GLUCOSE BLD-MCNC: 84 MG/DL (ref 70–99)
HCT VFR BLD AUTO: 28.9 % (ref 35–47)
HGB BLD-MCNC: 9 G/DL (ref 11.7–15.7)
MAGNESIUM SERPL-MCNC: 1.5 MG/DL (ref 1.6–2.3)
MCH RBC QN AUTO: 28.2 PG (ref 26.5–33)
MCHC RBC AUTO-ENTMCNC: 31.1 G/DL (ref 31.5–36.5)
MCV RBC AUTO: 91 FL (ref 78–100)
PHOSPHATE SERPL-MCNC: 4.1 MG/DL (ref 2.5–4.5)
PLATELET # BLD AUTO: 327 10E3/UL (ref 150–450)
POTASSIUM BLD-SCNC: 4.3 MMOL/L (ref 3.4–5.3)
PROT SERPL-MCNC: 6.5 G/DL (ref 6.8–8.8)
RBC # BLD AUTO: 3.19 10E6/UL (ref 3.8–5.2)
SODIUM SERPL-SCNC: 143 MMOL/L (ref 133–144)
TACROLIMUS BLD-MCNC: 7.1 UG/L (ref 5–15)
TME LAST DOSE: NORMAL H
TME LAST DOSE: NORMAL H
WBC # BLD AUTO: 5.7 10E3/UL (ref 4–11)

## 2021-12-27 PROCEDURE — 99214 OFFICE O/P EST MOD 30 MIN: CPT | Mod: 24 | Performed by: TRANSPLANT SURGERY

## 2021-12-27 PROCEDURE — 82105 ALPHA-FETOPROTEIN SERUM: CPT | Performed by: INTERNAL MEDICINE

## 2021-12-27 PROCEDURE — 80197 ASSAY OF TACROLIMUS: CPT | Performed by: TRANSPLANT SURGERY

## 2021-12-27 PROCEDURE — 36415 COLL VENOUS BLD VENIPUNCTURE: CPT | Performed by: PATHOLOGY

## 2021-12-27 RX ORDER — TACROLIMUS 1 MG/1
CAPSULE ORAL
Qty: 810 CAPSULE | Refills: 3 | Status: SHIPPED | OUTPATIENT
Start: 2021-12-27 | End: 2022-01-04

## 2021-12-27 RX ORDER — MIRTAZAPINE 15 MG/1
15 TABLET, FILM COATED ORAL AT BEDTIME
Qty: 30 TABLET | Refills: 0 | Status: SHIPPED | OUTPATIENT
Start: 2021-12-27 | End: 2022-01-17

## 2021-12-27 NOTE — LETTER
12/27/2021     RE: Berta Nava  [address] [facility] [id number]    Dear Colleague,    Thank you for referring your patient, Berta Nava, to the [facility]. Please see a copy of my visit note below.    Transplant Surgery -OUTPATIENT IMMUNOSUPPRESSION PROGRESS NOTE    Date of Visit: 12/27/2021    Transplants:  11/18/2021 (Liver); Postoperative day:  39  ASSESMENT AND PLAN:  1.Graft Function: Liver allograft: no rejection or technical problems.    2.Immunosuppression Management: Keep tacrolimus levels 8-10 ng/dL  3.Hypertension: ok  4.Renal Function: better  5.Lab frequency: weekly  6.Other:  Wound: needs packing, wound is improving  Stitches removed, spent 15 min for that    Date: December 27, 2021    Transplant:  [x]                             Liver [x]                              Kidney []                             Pancreas []                              Other:             Chief Complaint:  Doing well, feels better    History of Present Illness:  Patient Active Problem List   Diagnosis     Cholangitis     Hypokalemia     Chronic fatigue     Essential hypertension     Hyperlipidemia     Mixed hearing loss, bilateral     Neoplasm of uncertain behavior of skin     PSC (primary sclerosing cholangitis)     Sicca syndrome (H)     Ulcerative pancolitis with complication (H)     Unsatisfactory cervical Papanicolaou smear     Cirrhosis of liver with ascites (H)     Sepsis (H)     SBP (spontaneous bacterial peritonitis) (H)     Choledocholithiasis     Encounter for replacement of biliary stent     Secondary esophageal varices without bleeding (H)     Primary sclerosing cholangitis     Liver transplant planned     Liver transplant candidate     Acute kidney failure, unspecified (H)     Severe protein-calorie malnutrition (H)     Encephalopathy     Hyperammonemia (H)     Leukocytosis, unspecified type     Hepatic encephalopathy (H)     Chronic liver failure without hepatic  coma (H)     Immunosuppressed status (H)     S/P liver transplant (H)     Anemia due to chronic kidney disease     SOCIAL /FAMILY HISTORY: [x]                  No recent change    Past Medical History:   Diagnosis Date     Ascites      Biliary cirrhosis (H)      Cholangitis, sclerosing      Cirrhosis of liver with ascites (H) 3/3/2021     Hearing loss of left ear     wears a hearing aide     History of low potassium      Hyperlipidemia      Hypertension      SBP (spontaneous bacterial peritonitis) (H) 4/30/2021     Sjogren's syndrome (H)      Ulcerative colitis (H)      Ulcerative pancolitis (H)      Past Surgical History:   Procedure Laterality Date     BENCH LIVER N/A 11/18/2021    Procedure: Bench liver;  Surgeon: Ernesto Schmitt MD;  [facility]     CV CORONARY ANGIOGRAM N/A 8/25/2021    Procedure: Coronary Angiogram with possible intervention;  Surgeon: David Wilhelm MD;  Location:  HEART CARDIAC CATH LAB     ENDOSCOPIC RETROGRADE CHOLANGIOPANCREATOGRAM N/A 6/25/2021    Procedure: ENDOSCOPIC RETROGRADE CHOLANGIOPANCREATOGRAPHY with ballon sweep of bile ducts for stones, balloon dilation of bile ducts and bile duct stent placement;  Surgeon: Gregory Gabriel MD;  [facility]     ENDOSCOPIC RETROGRADE CHOLANGIOPANCREATOGRAM N/A 7/22/2021    Procedure: ENDOSCOPIC RETROGRADE CHOLANGIOPANCREATOGRAPHY STONE REMOVAL, DILATION AND STENT PLACEMENT;  Surgeon: Gregory Gabriel MD;  Location:  OR     ENDOSCOPIC RETROGRADE CHOLANGIOPANCREATOGRAPHY, EXCHANGE TUBE/STENT N/A 05/05/2021    Procedure: ENDOSCOPIC RETROGRADE CHOLANGIOPANCREATOGRAPHY WITH STENT EXCHANGE, STONE EXTRACTION, AND DILATION;  Surgeon: Gregory Gabriel MD;  [facility]     ENDOSCOPIC RETROGRADE CHOLANGIOPANCREATOGRAPHY, EXCHANGE TUBE/STENT N/A 5/10/2021    Procedure: ENDOSCOPIC RETROGRADE CHOLANGIOPANCREATOGRAPHY with biliary dilation, stone removal, stent exchange;  Surgeon: Gregory Gabriel MD;  Location:  OR     ENDOSCOPIC  RETROGRADE CHOLANGIOPANCREATOGRAPHY, EXCHANGE TUBE/STENT N/A 6/10/2021    Procedure: ENDOSCOPIC RETROGRADE CHOLANGIOPANCREATOGRAPHY, WITH biliary stent exchange, stone removal;  Surgeon: Woodrow Lott MD;  [facility]     ENDOSCOPIC RETROGRADE CHOLANGIOPANCREATOGRAPHY, EXCHANGE TUBE/STENT N/A 2021    Procedure: ENDOSCOPIC RETROGRADE CHOLANGIOPANCREATOGRAPHY with biliary dilation, stone removal, stent exchange;  Surgeon: Gregory Gabriel MD;  [facility]     ENDOSCOPIC RETROGRADE CHOLANGIOPANCREATOGRAPHY, EXCHANGE TUBE/STENT N/A 10/1/2021    Procedure: ENDOSCOPIC RETROGRADE CHOLANGIOPANCREATOGRAPHY with balloon sweep of bile ducts, bile duct stent exchanged;  Surgeon: Gregory Gabriel MD;  [facility]     ESOPHAGOSCOPY, GASTROSCOPY, DUODENOSCOPY (EGD), COMBINED N/A 2021    Procedure: ESOPHAGOGASTRODUODENOSCOPY (EGD);  Surgeon: Leventhal, Thomas Michael, MD;  [facility]     ESOPHAGOSCOPY, GASTROSCOPY, DUODENOSCOPY (EGD), COMBINED N/A 10/1/2021    Procedure: ESOPHAGOGASTRODUODENOSCOPY (EGD) with varices banding;  Surgeon: Gregory Gabriel MD;  [facility]     GYN SURGERY      bilat fallopian tubes and ovaries removed     PICC DOUBLE LUMEN PLACEMENT Right 2021    42cm (2cm external), Lateral brachial vein     RETURN LIVER TRANSPLANT N/A 2021    Procedure: CLOSURE, WOUND, ABDOMEN, SECONDARY, ABDOMINAL WASHOUT;  Surgeon: Ernesto Schmitt MD;  [facility]     TRANSPLANT LIVER RECIPIENT  DONOR N/A 2021    Procedure: Opening of abdomen, Abdominal Exploration and aborted liver transplant.;  Surgeon: Ernesto Schmitt MD;  [facility]     TRANSPLANT LIVER RECIPIENT  DONOR N/A 2021    Procedure: TRANSPLANT, LIVER, RECIPIENT,  DONOR;  Surgeon: Ernesto Schmitt MD;  [facility]     Social History     Socioeconomic History     Marital status:      Spouse name: Not on file     Number of children: Not on file     Years of  education: Not on file     Highest education level: Not on file   Occupational History     Not on file   Tobacco Use     Smoking status: Never Smoker     Smokeless tobacco: Never Used   Vaping Use     Vaping Use: Never used   Substance and Sexual Activity     Alcohol use: No     Comment: Last drink was 2017     Drug use: No     Sexual activity: Not Currently     Partners: Male   Other Topics Concern     Parent/sibling w/ CABG, MI or angioplasty before 65F 55M? Not Asked   Social History Narrative     Not on file     Social Determinants of Health     Financial Resource Strain: Not on file   Food Insecurity: Not on file   Transportation Needs: Not on file   Physical Activity: Not on file   Stress: Not on file   Social Connections: Not on file   Intimate Partner Violence: Not on file   Housing Stability: Not on file     Prescription Medications as of 2021       Rx Number Disp Refills Start End Last Dispensed Date Next Fill Date Owning Pharmacy    aspirin (ASA) 325 MG tablet  90 tablet 3 2021    [email or web address] [address] [place] [address] [address] [place] [address]    Si tablet (325 mg) by Oral or Feeding Tube route daily    Class: E-Prescribe    Route: Oral or Feeding Tube    Gauze Pads & Dressings (CURITY IODOFORM PACKING STRIP) MISC  4 each 3 2021    [place] [id number] [facility]    Sig: Pack abdominal wound twice daily    Class: E-Prescribe    magnesium oxide (MAG-OX) 400 MG tablet  180 tablet 3 2021    [email or web address] [address] [place] [address] [address] [place] [address]    Sig: Take 1 tablet (400 mg) by mouth 2 times daily    Class: E-Prescribe    Route: Oral    mirtazapine (REMERON) 15 MG tablet  30 tablet 0 2021    [email or web address] [address] [place] [address] [address] [place] [address]    Sig: Take 1 tablet (15 mg) by mouth At Bedtime    Class: E-Prescribe     Route: Oral    multivitamin (CENTRUM SILVER) tablet            Sig: Take 1 tablet by mouth daily    Class: Historical    Route: Oral    mycophenolate (GENERIC EQUIVALENT) 250 MG capsule  540 capsule 3 11/30/2021    [facility] [address] [place] [address] [address] [place] [address]    Sig: Take 3 capsules (750 mg) by mouth 2 times daily    Class: E-Prescribe    Notes to Pharmacy: TXP DT 11/18/2021 (Liver) TXP Dischg DT 11/27/2021 DX Liver replaced by transplant Z94.4 TX Center [facility] ([place], [place])    Route: Oral    pantoprazole (PROTONIX) 40 MG EC tablet  180 tablet 3 11/30/2021    [facility] [address] [place] [address] [address] [place] [address]    Sig: Take 1 tablet (40 mg) by mouth 2 times daily    Class: E-Prescribe    Route: Oral    sulfamethoxazole-trimethoprim (BACTRIM) 400-80 MG tablet  90 tablet 3 11/30/2021    [facility] [address] [place] [address] [address] [place] [address]    Sig: Take 1 tablet by mouth daily    Class: E-Prescribe    Route: Oral    tacrolimus (GENERIC EQUIVALENT) 1 MG capsule  810 capsule 3 12/27/2021    [facility] [address] [place] [address] [address] [place] [address]    Sig: Take 4 capsules (4 mg) by mouth every morning AND 5 capsules (5 mg) every evening.    Class: E-Prescribe    Notes to Pharmacy: Dose change    Route: Oral    ursodiol (ACTIGALL) 300 MG capsule  180 capsule 3 11/30/2021    [facility] [address] [place] [address] [address] [place] [address]    Sig: Take 1 capsule (300 mg) by mouth 2 times daily    Class: E-Prescribe    Route: Oral    valGANciclovir (VALCYTE) 450 MG tablet  90 tablet 3 11/30/2021    [facility] [address] [place] [address] [address] [place] [address]    Sig: Take 1 tablet (450 mg) by mouth daily    Class: E-Prescribe    Route: Oral         Diagnostic x-ray materials and Contrast dye   REVIEW OF SYSTEMS (check box if normal)  [x]               GENERAL  [x]                 PULMONARY [x]                GENITOURINARY  [x]                CNS                 [x]                 CARDIAC  [x]                 ENDOCRINE  [x]                EARS,NOSE,THROAT [x]                 GASTROINTESTINAL [x]                 NEUROLOGIC    [x]                MUSCLOSKELTAL  [x]                  HEMATOLOGY      PHYSICAL EXAM (check box if normal)BP (!) 164/84   Pulse 82   Wt 61.6 kg (135 lb 11.2 oz)   LMP  (LMP Unknown)   SpO2 100%   Breastfeeding No   BMI 22.58 kg/m          [x]            GENERAL:    [x]       EYES:  ICTERIC   []        YES  []                    NO  [x]           EXTREMITIES: Clubbing []       Y     [x]           N    [x]           EARS, NOSE, THROAT: Membranes Moist    YES   [x]                   NO []                  [x]           LUNGS:  CLEAR    YES       [x]                  NO    []                                [x]           SKIN: Jaundice           YES       []                  NO    [x]                   Rash: YES       []                  NO    [x]                                     [x]             HEART: Regular Rate          YES       [x]                  NO    []                   Incision Clean:  YES       [x]                  NO    []                                [x]                    ABDOMEN: Organomegaly YES       []                  NO    [x]                       [x]                    NEUROLOGICAL:  Nonfocal  YES       [x]                  NO    []                       [x]                    Hernia YES       []                  NO    [x]                   PSYCHIATRIC:  Appropriate  YES       [x]                  NO    []                       OTHER:                                                                                                   PAIN SCALE:: 3    Again, thank you for allowing me to participate in the  care of your patient.      Sincerely,    Ernesto Schmitt MD

## 2021-12-27 NOTE — PROGRESS NOTES
Transplant Surgery -OUTPATIENT IMMUNOSUPPRESSION PROGRESS NOTE    Date of Visit: 12/27/2021    Transplants:  11/18/2021 (Liver); Postoperative day:  39  ASSESMENT AND PLAN:  1.Graft Function: Liver allograft: no rejection or technical problems.    2.Immunosuppression Management: Keep tacrolimus levels 8-10 ng/dL  3.Hypertension: ok  4.Renal Function: better  5.Lab frequency: weekly  6.Other:  Wound: needs packing, wound is improving  Stitches removed, spent 15 min for that    Date: December 27, 2021    Transplant:  [x]                             Liver [x]                              Kidney []                             Pancreas []                              Other:             Chief Complaint:  Doing well, feels better    History of Present Illness:  Patient Active Problem List   Diagnosis     Cholangitis     Hypokalemia     Chronic fatigue     Essential hypertension     Hyperlipidemia     Mixed hearing loss, bilateral     Neoplasm of uncertain behavior of skin     PSC (primary sclerosing cholangitis)     Sicca syndrome (H)     Ulcerative pancolitis with complication (H)     Unsatisfactory cervical Papanicolaou smear     Cirrhosis of liver with ascites (H)     Sepsis (H)     SBP (spontaneous bacterial peritonitis) (H)     Choledocholithiasis     Encounter for replacement of biliary stent     Secondary esophageal varices without bleeding (H)     Primary sclerosing cholangitis     Liver transplant planned     Liver transplant candidate     Acute kidney failure, unspecified (H)     Severe protein-calorie malnutrition (H)     Encephalopathy     Hyperammonemia (H)     Leukocytosis, unspecified type     Hepatic encephalopathy (H)     Chronic liver failure without hepatic coma (H)     Immunosuppressed status (H)     S/P liver transplant (H)     Anemia due to chronic kidney disease     SOCIAL /FAMILY HISTORY: [x]                  No recent change    Past Medical History:   Diagnosis Date     Ascites      Biliary  cirrhosis (H)      Cholangitis, sclerosing      Cirrhosis of liver with ascites (H) 3/3/2021     Hearing loss of left ear     wears a hearing aide     History of low potassium      Hyperlipidemia      Hypertension      SBP (spontaneous bacterial peritonitis) (H) 4/30/2021     Sjogren's syndrome (H)      Ulcerative colitis (H)      Ulcerative pancolitis (H)      Past Surgical History:   Procedure Laterality Date     BENCH LIVER N/A 11/18/2021    Procedure: Bench liver;  Surgeon: Ernesto Schmitt MD;  [facility]     CV CORONARY ANGIOGRAM N/A 8/25/2021    Procedure: Coronary Angiogram with possible intervention;  Surgeon: David Wilhelm MD;  [facility]     ENDOSCOPIC RETROGRADE CHOLANGIOPANCREATOGRAM N/A 6/25/2021    Procedure: ENDOSCOPIC RETROGRADE CHOLANGIOPANCREATOGRAPHY with ballon sweep of bile ducts for stones, balloon dilation of bile ducts and bile duct stent placement;  Surgeon: Gregory Gabriel MD;  [facility]     ENDOSCOPIC RETROGRADE CHOLANGIOPANCREATOGRAM N/A 7/22/2021    Procedure: ENDOSCOPIC RETROGRADE CHOLANGIOPANCREATOGRAPHY STONE REMOVAL, DILATION AND STENT PLACEMENT;  Surgeon: Gregory Gabriel MD;  Location:  OR     ENDOSCOPIC RETROGRADE CHOLANGIOPANCREATOGRAPHY, EXCHANGE TUBE/STENT N/A 05/05/2021    Procedure: ENDOSCOPIC RETROGRADE CHOLANGIOPANCREATOGRAPHY WITH STENT EXCHANGE, STONE EXTRACTION, AND DILATION;  Surgeon: Gregory Gabriel MD;  [facility]     ENDOSCOPIC RETROGRADE CHOLANGIOPANCREATOGRAPHY, EXCHANGE TUBE/STENT N/A 5/10/2021    Procedure: ENDOSCOPIC RETROGRADE CHOLANGIOPANCREATOGRAPHY with biliary dilation, stone removal, stent exchange;  Surgeon: Gregory Gabriel MD;  [facility]     ENDOSCOPIC RETROGRADE CHOLANGIOPANCREATOGRAPHY, EXCHANGE TUBE/STENT N/A 6/10/2021    Procedure: ENDOSCOPIC RETROGRADE CHOLANGIOPANCREATOGRAPHY, WITH biliary stent exchange, stone removal;  Surgeon: Woodrow Lott MD;  [facility]     ENDOSCOPIC  RETROGRADE CHOLANGIOPANCREATOGRAPHY, EXCHANGE TUBE/STENT N/A 2021    Procedure: ENDOSCOPIC RETROGRADE CHOLANGIOPANCREATOGRAPHY with biliary dilation, stone removal, stent exchange;  Surgeon: Gregory Gabriel MD;  [facility]     ENDOSCOPIC RETROGRADE CHOLANGIOPANCREATOGRAPHY, EXCHANGE TUBE/STENT N/A 10/1/2021    Procedure: ENDOSCOPIC RETROGRADE CHOLANGIOPANCREATOGRAPHY with balloon sweep of bile ducts, bile duct stent exchanged;  Surgeon: Gregory Gabriel MD;  [facility]     ESOPHAGOSCOPY, GASTROSCOPY, DUODENOSCOPY (EGD), COMBINED N/A 2021    Procedure: ESOPHAGOGASTRODUODENOSCOPY (EGD);  Surgeon: Leventhal, Thomas Michael, MD;  [facility]     ESOPHAGOSCOPY, GASTROSCOPY, DUODENOSCOPY (EGD), COMBINED N/A 10/1/2021    Procedure: ESOPHAGOGASTRODUODENOSCOPY (EGD) with varices banding;  Surgeon: Gregory Gabriel MD;  [facility]     GYN SURGERY      bilat fallopian tubes and ovaries removed     PICC DOUBLE LUMEN PLACEMENT Right 2021    42cm (2cm external), Lateral brachial vein     RETURN LIVER TRANSPLANT N/A 2021    Procedure: CLOSURE, WOUND, ABDOMEN, SECONDARY, ABDOMINAL WASHOUT;  Surgeon: Ernesto Schmitt MD;  [facility]     TRANSPLANT LIVER RECIPIENT  DONOR N/A 2021    Procedure: Opening of abdomen, Abdominal Exploration and aborted liver transplant.;  Surgeon: Ernesto Schmitt MD;  [facility]     TRANSPLANT LIVER RECIPIENT  DONOR N/A 2021    Procedure: TRANSPLANT, LIVER, RECIPIENT,  DONOR;  Surgeon: Ernesto Schmitt MD;  [facility]     Social History     Socioeconomic History     Marital status:      Spouse name: Not on file     Number of children: Not on file     Years of education: Not on file     Highest education level: Not on file   Occupational History     Not on file   Tobacco Use     Smoking status: Never Smoker     Smokeless tobacco: Never Used   Vaping Use     Vaping Use: Never used   Substance and  Sexual Activity     Alcohol use: No     Comment: Last drink was 2017     Drug use: No     Sexual activity: Not Currently     Partners: Male   Other Topics Concern     Parent/sibling w/ CABG, MI or angioplasty before 65F 55M? Not Asked   Social History Narrative     Not on file     Social Determinants of Health     Financial Resource Strain: Not on file   Food Insecurity: Not on file   Transportation Needs: Not on file   Physical Activity: Not on file   Stress: Not on file   Social Connections: Not on file   Intimate Partner Violence: Not on file   Housing Stability: Not on file     Prescription Medications as of 2021       Rx Number Disp Refills Start End Last Dispensed Date Next Fill Date Owning Pharmacy    aspirin (ASA) 325 MG tablet  90 tablet 3 2021    [facility] [address] [place] [address] [address] [place] [address]    Si tablet (325 mg) by Oral or Feeding Tube route daily    Class: E-Prescribe    Route: Oral or Feeding Tube    Gauze Pads & Dressings (CURITY IODOFORM PACKING STRIP) MISC  4 each 3 2021    [address]    Sig: Pack abdominal wound twice daily    Class: E-Prescribe    magnesium oxide (MAG-OX) 400 MG tablet  180 tablet 3 2021    [facility] [address] [place] [address] [address] [place] [address]    Sig: Take 1 tablet (400 mg) by mouth 2 times daily    Class: E-Prescribe    Route: Oral    mirtazapine (REMERON) 15 MG tablet  30 tablet 0 2021    [facility] [address] [place] [address] [address] [place] [address]    Sig: Take 1 tablet (15 mg) by mouth At Bedtime    Class: E-Prescribe    Route: Oral    multivitamin (CENTRUM SILVER) tablet            Sig: Take 1 tablet by mouth daily    Class: Historical    Route: Oral    mycophenolate (GENERIC EQUIVALENT) 250 MG capsule  540 capsule 3 2021    [facility]  [address] [place] [address] [address] [place] [address]    Sig: Take 3 capsules (750 mg) by mouth 2 times daily    Class: E-Prescribe    Notes to Pharmacy: TXP DT 11/18/2021 (Liver) TXP Dischg DT 11/27/2021 DX Liver replaced by transplant Z94.4 TX Center [facility] ([place], [place])    Route: Oral    pantoprazole (PROTONIX) 40 MG EC tablet  180 tablet 3 11/30/2021    [facility] [address] [place] [address] [address] [place] [address]    Sig: Take 1 tablet (40 mg) by mouth 2 times daily    Class: E-Prescribe    Route: Oral    sulfamethoxazole-trimethoprim (BACTRIM) 400-80 MG tablet  90 tablet 3 11/30/2021    [facility] [address] [place] [address] [address] [place] [address]    Sig: Take 1 tablet by mouth daily    Class: E-Prescribe    Route: Oral    tacrolimus (GENERIC EQUIVALENT) 1 MG capsule  810 capsule 3 12/27/2021    [facility] [address] [place] [address] [address] [place] [address]    Sig: Take 4 capsules (4 mg) by mouth every morning AND 5 capsules (5 mg) every evening.    Class: E-Prescribe    Notes to Pharmacy: Dose change    Route: Oral    ursodiol (ACTIGALL) 300 MG capsule  180 capsule 3 11/30/2021    [facility] [address] [place] [address] [address] [place] [address]    Sig: Take 1 capsule (300 mg) by mouth 2 times daily    Class: E-Prescribe    Route: Oral    valGANciclovir (VALCYTE) 450 MG tablet  90 tablet 3 11/30/2021    [facility] [address] [place] [address] [address] [place] [address]    Sig: Take 1 tablet (450 mg) by mouth daily    Class: E-Prescribe    Route: Oral        Diagnostic x-ray materials and Contrast dye   REVIEW OF SYSTEMS (check box if normal)  [x]               GENERAL  [x]                 PULMONARY [x]                GENITOURINARY  [x]                CNS                 [x]                  CARDIAC  [x]                 ENDOCRINE  [x]                EARS,NOSE,THROAT [x]                 GASTROINTESTINAL [x]                 NEUROLOGIC    [x]                MUSCLOSKELTAL  [x]                  HEMATOLOGY      PHYSICAL EXAM (check box if normal)BP (!) 164/84   Pulse 82   Wt 61.6 kg (135 lb 11.2 oz)   LMP  (LMP Unknown)   SpO2 100%   Breastfeeding No   BMI 22.58 kg/m          [x]            GENERAL:    [x]       EYES:  ICTERIC   []        YES  []                    NO  [x]           EXTREMITIES: Clubbing []       Y     [x]           N    [x]           EARS, NOSE, THROAT: Membranes Moist    YES   [x]                   NO []                  [x]           LUNGS:  CLEAR    YES       [x]                  NO    []                                [x]           SKIN: Jaundice           YES       []                  NO    [x]                   Rash: YES       []                  NO    [x]                                     [x]             HEART: Regular Rate          YES       [x]                  NO    []                   Incision Clean:  YES       [x]                  NO    []                                [x]                    ABDOMEN: Organomegaly YES       []                  NO    [x]                       [x]                    NEUROLOGICAL:  Nonfocal  YES       [x]                  NO    []                       [x]                    Hernia YES       []                  NO    [x]                   PSYCHIATRIC:  Appropriate  YES       [x]                  NO    []                       OTHER:                                                                                                   PAIN SCALE:: 3

## 2021-12-28 ENCOUNTER — TELEPHONE (OUTPATIENT)
Dept: INTERNAL MEDICINE | Facility: CLINIC | Age: 56
End: 2021-12-28

## 2021-12-28 NOTE — TELEPHONE ENCOUNTER
Fax received from [facility] 12/23/21 for review and signature.  Put in Tila Nevarez's in basket.      '

## 2021-12-29 ENCOUNTER — TELEPHONE (OUTPATIENT)
Dept: TRANSPLANT | Facility: CLINIC | Age: 56
End: 2021-12-29
Payer: COMMERCIAL

## 2021-12-29 NOTE — TELEPHONE ENCOUNTER
Call to Berta to check in.  No answer.  Call to daughter, Karin.  She is just on her wayto her mom's, says they will call me together when she gets there.

## 2021-12-29 NOTE — TELEPHONE ENCOUNTER
Call back from Karin and Berta.  Berta sounded good.  She had a few questions.  Was wondering if her paperwork was sent to [place], I told her that it was.  Dr. Schmitt told her she can do labs weekly.  She wants to stop homecare.  She is comfortable doing her wound care, thinks wound is getting smaller and will go to [facility] for lab draws.  Knows that she has to make an appt to get labs drawn.  PT is done.  She is eating well, drinking well.      I called Re, her homecare nurse, LM letting her know that they can stop service when Berta is ready, thinking it will be next week.

## 2022-01-03 ENCOUNTER — LAB (OUTPATIENT)
Dept: LAB | Facility: CLINIC | Age: 57
End: 2022-01-03
Payer: COMMERCIAL

## 2022-01-03 DIAGNOSIS — Z94.4 LIVER REPLACED BY TRANSPLANT (H): ICD-10-CM

## 2022-01-03 LAB
ALBUMIN SERPL-MCNC: 3 G/DL (ref 3.4–5)
ALP SERPL-CCNC: 73 U/L (ref 40–150)
ALT SERPL W P-5'-P-CCNC: 15 U/L (ref 0–50)
ANION GAP SERPL CALCULATED.3IONS-SCNC: 8 MMOL/L (ref 3–14)
AST SERPL W P-5'-P-CCNC: 11 U/L (ref 0–45)
BILIRUB DIRECT SERPL-MCNC: 0.2 MG/DL (ref 0–0.2)
BILIRUB SERPL-MCNC: 0.4 MG/DL (ref 0.2–1.3)
BUN SERPL-MCNC: 18 MG/DL (ref 7–30)
CALCIUM SERPL-MCNC: 8.4 MG/DL (ref 8.5–10.1)
CHLORIDE BLD-SCNC: 113 MMOL/L (ref 94–109)
CO2 SERPL-SCNC: 21 MMOL/L (ref 20–32)
CREAT SERPL-MCNC: 0.65 MG/DL (ref 0.52–1.04)
ERYTHROCYTE [DISTWIDTH] IN BLOOD BY AUTOMATED COUNT: 17.2 % (ref 10–15)
GFR SERPL CREATININE-BSD FRML MDRD: >90 ML/MIN/1.73M2
GLUCOSE BLD-MCNC: 89 MG/DL (ref 70–99)
HCT VFR BLD AUTO: 31.2 % (ref 35–47)
HGB BLD-MCNC: 9.5 G/DL (ref 11.7–15.7)
MAGNESIUM SERPL-MCNC: 1.6 MG/DL (ref 1.6–2.3)
MCH RBC QN AUTO: 27.8 PG (ref 26.5–33)
MCHC RBC AUTO-ENTMCNC: 30.4 G/DL (ref 31.5–36.5)
MCV RBC AUTO: 91 FL (ref 78–100)
PHOSPHATE SERPL-MCNC: 4.3 MG/DL (ref 2.5–4.5)
PLATELET # BLD AUTO: 159 10E3/UL (ref 150–450)
POTASSIUM BLD-SCNC: 3.7 MMOL/L (ref 3.4–5.3)
PROT SERPL-MCNC: 6.3 G/DL (ref 6.8–8.8)
RBC # BLD AUTO: 3.42 10E6/UL (ref 3.8–5.2)
SODIUM SERPL-SCNC: 142 MMOL/L (ref 133–144)
WBC # BLD AUTO: 5.7 10E3/UL (ref 4–11)

## 2022-01-03 PROCEDURE — 36415 COLL VENOUS BLD VENIPUNCTURE: CPT

## 2022-01-03 PROCEDURE — 80053 COMPREHEN METABOLIC PANEL: CPT

## 2022-01-03 PROCEDURE — 84100 ASSAY OF PHOSPHORUS: CPT

## 2022-01-03 PROCEDURE — 85027 COMPLETE CBC AUTOMATED: CPT

## 2022-01-03 PROCEDURE — 80197 ASSAY OF TACROLIMUS: CPT

## 2022-01-03 PROCEDURE — 83735 ASSAY OF MAGNESIUM: CPT

## 2022-01-03 PROCEDURE — 82248 BILIRUBIN DIRECT: CPT

## 2022-01-04 DIAGNOSIS — Z94.4 STATUS POST LIVER TRANSPLANTATION (H): ICD-10-CM

## 2022-01-04 LAB
TACROLIMUS BLD-MCNC: 5.9 UG/L (ref 5–15)
TME LAST DOSE: NORMAL H
TME LAST DOSE: NORMAL H

## 2022-01-04 RX ORDER — TACROLIMUS 1 MG/1
1 CAPSULE ORAL EVERY EVENING
Qty: 90 CAPSULE | Refills: 3 | Status: SHIPPED | OUTPATIENT
Start: 2022-01-04 | End: 2022-02-08

## 2022-01-04 RX ORDER — TACROLIMUS 5 MG/1
5 CAPSULE ORAL EVERY 12 HOURS
Qty: 180 CAPSULE | Refills: 3 | Status: SHIPPED | OUTPATIENT
Start: 2022-01-04 | End: 2022-02-08

## 2022-01-04 NOTE — TELEPHONE ENCOUNTER
ISSUE:   Tacrolimus IR level 5.9 on 1/3/22, goal 6-8, dose 4 mg in the morning and 5 mg in the evening.    PLAN:   Please call patient and confirm this was an accurate 12-hour trough. Verify Tacrolimus IR dose. Confirm no new medications or illness. Confirm no missed doses. If accurate trough and accurate dose, increase Tacrolimus IR dose to 5 mg in the morning and 6 mg in the evening and repeat labs in on 1/10/22.    Vanessa Durant RN  OUTCOME:   Spoke with patient, they confirm accurate trough level and current dose 4 mg am, 5 mg pm. Patient confirmed dose change to 5 mg am, 6 mg pm and to repeat labs on 1/10/22. Orders sent to preferred pharmacy for dose change and lab for repeat labs. Patient voiced understanding of plan.     Anne Marie Miranda LPN

## 2022-01-07 ENCOUNTER — TELEPHONE (OUTPATIENT)
Dept: TRANSPLANT | Facility: CLINIC | Age: 57
End: 2022-01-07
Payer: COMMERCIAL

## 2022-01-07 DIAGNOSIS — Z94.4 STATUS POST LIVER TRANSPLANTATION (H): ICD-10-CM

## 2022-01-07 NOTE — TELEPHONE ENCOUNTER
Post Liver Transplant Team Conference  Date: 1/7/2022  Transplant Coordinator: Vanessa Durant  Transplant Surgeon: Ernesto Schmitt      Attendees:  [] Dr. Mackay [] Dr. Traylor []       [x] Dr. Schmitt [x] Anne Marie Miranda LPN []    [] Dr. Gotti [] Andria Rowe NP []    [] Dr. Bernard [] Milady Rodriguez NP []    [] Dr. Wilks [] Milady Witt, ADRIANA []       [] Dr. Puri [x] Monalisa Bar, ADRIANA []       [] Dr. Ben Walker [x] Vanessa Durant, ADRIANA []       [x] Dr. YOHANNES Rojas [x] Lucy Shabazz, ADRIANA []       [] Dr. Peterson [] Lydia Reyes RN []       [] Dr. Blair [] Aaron Lazar, ADRIANA []         Verbal Plan Read Back:   Please ask Berta to lower ASA to 81 mg daily    Routed to RN Coordinator   Vanessa Durant, RN conf

## 2022-01-10 ENCOUNTER — LAB (OUTPATIENT)
Dept: LAB | Facility: CLINIC | Age: 57
End: 2022-01-10
Attending: TRANSPLANT SURGERY
Payer: COMMERCIAL

## 2022-01-10 ENCOUNTER — OFFICE VISIT (OUTPATIENT)
Dept: TRANSPLANT | Facility: CLINIC | Age: 57
End: 2022-01-10
Attending: TRANSPLANT SURGERY
Payer: COMMERCIAL

## 2022-01-10 VITALS
HEART RATE: 99 BPM | SYSTOLIC BLOOD PRESSURE: 185 MMHG | OXYGEN SATURATION: 100 % | BODY MASS INDEX: 23.03 KG/M2 | DIASTOLIC BLOOD PRESSURE: 104 MMHG | WEIGHT: 138.4 LBS

## 2022-01-10 DIAGNOSIS — Z94.4 LIVER TRANSPLANTED (H): Primary | ICD-10-CM

## 2022-01-10 DIAGNOSIS — Z94.4 LIVER REPLACED BY TRANSPLANT (H): ICD-10-CM

## 2022-01-10 LAB
ALBUMIN SERPL-MCNC: 3.6 G/DL (ref 3.4–5)
ALP SERPL-CCNC: 84 U/L (ref 40–150)
ALT SERPL W P-5'-P-CCNC: 20 U/L (ref 0–50)
ANION GAP SERPL CALCULATED.3IONS-SCNC: 10 MMOL/L (ref 3–14)
AST SERPL W P-5'-P-CCNC: 13 U/L (ref 0–45)
BILIRUB DIRECT SERPL-MCNC: 0.2 MG/DL (ref 0–0.2)
BILIRUB SERPL-MCNC: 0.3 MG/DL (ref 0.2–1.3)
BUN SERPL-MCNC: 26 MG/DL (ref 7–30)
CALCIUM SERPL-MCNC: 9 MG/DL (ref 8.5–10.1)
CHLORIDE BLD-SCNC: 111 MMOL/L (ref 94–109)
CO2 SERPL-SCNC: 20 MMOL/L (ref 20–32)
CREAT SERPL-MCNC: 0.76 MG/DL (ref 0.52–1.04)
ERYTHROCYTE [DISTWIDTH] IN BLOOD BY AUTOMATED COUNT: 16.2 % (ref 10–15)
GFR SERPL CREATININE-BSD FRML MDRD: 90 ML/MIN/1.73M2
GLUCOSE BLD-MCNC: 89 MG/DL (ref 70–99)
HCT VFR BLD AUTO: 34.4 % (ref 35–47)
HGB BLD-MCNC: 10.7 G/DL (ref 11.7–15.7)
MAGNESIUM SERPL-MCNC: 1.7 MG/DL (ref 1.6–2.3)
MCH RBC QN AUTO: 27.7 PG (ref 26.5–33)
MCHC RBC AUTO-ENTMCNC: 31.1 G/DL (ref 31.5–36.5)
MCV RBC AUTO: 89 FL (ref 78–100)
PHOSPHATE SERPL-MCNC: 4.6 MG/DL (ref 2.5–4.5)
PLATELET # BLD AUTO: 210 10E3/UL (ref 150–450)
POTASSIUM BLD-SCNC: 4.1 MMOL/L (ref 3.4–5.3)
PROT SERPL-MCNC: 6.8 G/DL (ref 6.8–8.8)
RBC # BLD AUTO: 3.86 10E6/UL (ref 3.8–5.2)
SODIUM SERPL-SCNC: 141 MMOL/L (ref 133–144)
TACROLIMUS BLD-MCNC: 8.9 UG/L (ref 5–15)
TME LAST DOSE: NORMAL H
TME LAST DOSE: NORMAL H
WBC # BLD AUTO: 6.1 10E3/UL (ref 4–11)

## 2022-01-10 PROCEDURE — 84100 ASSAY OF PHOSPHORUS: CPT | Performed by: PATHOLOGY

## 2022-01-10 PROCEDURE — 85027 COMPLETE CBC AUTOMATED: CPT | Performed by: PATHOLOGY

## 2022-01-10 PROCEDURE — 82248 BILIRUBIN DIRECT: CPT | Performed by: PATHOLOGY

## 2022-01-10 PROCEDURE — 99214 OFFICE O/P EST MOD 30 MIN: CPT | Mod: 24 | Performed by: TRANSPLANT SURGERY

## 2022-01-10 PROCEDURE — 36415 COLL VENOUS BLD VENIPUNCTURE: CPT | Performed by: PATHOLOGY

## 2022-01-10 PROCEDURE — 83735 ASSAY OF MAGNESIUM: CPT | Performed by: PATHOLOGY

## 2022-01-10 PROCEDURE — 80197 ASSAY OF TACROLIMUS: CPT | Performed by: TRANSPLANT SURGERY

## 2022-01-10 PROCEDURE — 80053 COMPREHEN METABOLIC PANEL: CPT | Performed by: PATHOLOGY

## 2022-01-10 NOTE — NURSING NOTE
Chief Complaint   Patient presents with     RECHECK     2 week follow up     Blood pressure (!) 185/104, pulse 99, weight 62.8 kg (138 lb 6.4 oz), SpO2 100 %, not currently breastfeeding.    Sherry Ellsworth on 1/10/2022 at 10:27 AM

## 2022-01-10 NOTE — PROGRESS NOTES
Transplant Surgery -OUTPATIENT IMMUNOSUPPRESSION PROGRESS NOTE    Date of Visit: 01/10/2022    Transplants:  11/18/2021 (Liver); Postoperative day:  53  ASSESMENT AND PLAN:  1.Graft Function:Liver allograft: no rejection or technical problems.    2.Immunosuppression Management: keep tacrolimus levels around 8 ng/dL  3.Hypertension: needs better control, to see PCP  4.Renal Function: ok  5.Lab frequency: weekly  6.Other:  Wound improving    Date: January 10, 2022    Transplant:  [x]                             Liver []                              Kidney []                             Pancreas []                              Other:             Chief Complaint:RECHECK (2 week follow up)    History of Present Illness:  Patient Active Problem List   Diagnosis     Cholangitis     Hypokalemia     Chronic fatigue     Essential hypertension     Hyperlipidemia     Mixed hearing loss, bilateral     Neoplasm of uncertain behavior of skin     PSC (primary sclerosing cholangitis)     Sicca syndrome (H)     Ulcerative pancolitis with complication (H)     Unsatisfactory cervical Papanicolaou smear     Cirrhosis of liver with ascites (H)     Sepsis (H)     SBP (spontaneous bacterial peritonitis) (H)     Choledocholithiasis     Encounter for replacement of biliary stent     Secondary esophageal varices without bleeding (H)     Primary sclerosing cholangitis     Liver transplant planned     Liver transplant candidate     Acute kidney failure, unspecified (H)     Severe protein-calorie malnutrition (H)     Encephalopathy     Hyperammonemia (H)     Leukocytosis, unspecified type     Hepatic encephalopathy (H)     Chronic liver failure without hepatic coma (H)     Immunosuppressed status (H)     S/P liver transplant (H)     Anemia due to chronic kidney disease     SOCIAL /FAMILY HISTORY: [x]                  No recent change    Past Medical History:   Diagnosis Date     Ascites      Biliary cirrhosis (H)      Cholangitis, sclerosing       Cirrhosis of liver with ascites (H) 3/3/2021     Hearing loss of left ear     wears a hearing aide     History of low potassium      Hyperlipidemia      Hypertension      SBP (spontaneous bacterial peritonitis) (H) 4/30/2021     Sjogren's syndrome (H)      Ulcerative colitis (H)      Ulcerative pancolitis (H)      Past Surgical History:   Procedure Laterality Date     BENCH LIVER N/A 11/18/2021    Procedure: Bench liver;  Surgeon: Ernesto Schmitt MD;  Location: [facility]     CV CORONARY ANGIOGRAM N/A 8/25/2021    Procedure: Coronary Angiogram with possible intervention;  Surgeon: David Wilhelm MD;  Location:  [facility]     ENDOSCOPIC RETROGRADE CHOLANGIOPANCREATOGRAM N/A 6/25/2021    Procedure: ENDOSCOPIC RETROGRADE CHOLANGIOPANCREATOGRAPHY with ballon sweep of bile ducts for stones, balloon dilation of bile ducts and bile duct stent placement;  Surgeon: Gregory Gabriel MD;  Location: [facility]     ENDOSCOPIC RETROGRADE CHOLANGIOPANCREATOGRAM N/A 7/22/2021    Procedure: ENDOSCOPIC RETROGRADE CHOLANGIOPANCREATOGRAPHY STONE REMOVAL, DILATION AND STENT PLACEMENT;  Surgeon: Gregory Gabriel MD;  Location:  OR     ENDOSCOPIC RETROGRADE CHOLANGIOPANCREATOGRAPHY, EXCHANGE TUBE/STENT N/A 05/05/2021    Procedure: ENDOSCOPIC RETROGRADE CHOLANGIOPANCREATOGRAPHY WITH STENT EXCHANGE, STONE EXTRACTION, AND DILATION;  Surgeon: Gregory Gabriel MD;  [facility]: [facility]     ENDOSCOPIC RETROGRADE CHOLANGIOPANCREATOGRAPHY, EXCHANGE TUBE/STENT N/A 5/10/2021    Procedure: ENDOSCOPIC RETROGRADE CHOLANGIOPANCREATOGRAPHY with biliary dilation, stone removal, stent exchange;  Surgeon: Gregory Gabriel MD;  Location: [facility]     ENDOSCOPIC RETROGRADE CHOLANGIOPANCREATOGRAPHY, EXCHANGE TUBE/STENT N/A 6/10/2021    Procedure: ENDOSCOPIC RETROGRADE CHOLANGIOPANCREATOGRAPHY, WITH biliary stent exchange, stone removal;  Surgeon: Woodrow Lott MD;  Location: [facility]     ENDOSCOPIC RETROGRADE CHOLANGIOPANCREATOGRAPHY, EXCHANGE  TUBE/STENT N/A 2021    Procedure: ENDOSCOPIC RETROGRADE CHOLANGIOPANCREATOGRAPHY with biliary dilation, stone removal, stent exchange;  Surgeon: Gregory Gabriel MD;  [facility]     ENDOSCOPIC RETROGRADE CHOLANGIOPANCREATOGRAPHY, EXCHANGE TUBE/STENT N/A 10/1/2021    Procedure: ENDOSCOPIC RETROGRADE CHOLANGIOPANCREATOGRAPHY with balloon sweep of bile ducts, bile duct stent exchanged;  Surgeon: Gregory Gabriel MD;  Location: [facility]     ESOPHAGOSCOPY, GASTROSCOPY, DUODENOSCOPY (EGD), COMBINED N/A 2021    Procedure: ESOPHAGOGASTRODUODENOSCOPY (EGD);  Surgeon: Leventhal, Thomas Michael, MD;  Location: [facility]     ESOPHAGOSCOPY, GASTROSCOPY, DUODENOSCOPY (EGD), COMBINED N/A 10/1/2021    Procedure: ESOPHAGOGASTRODUODENOSCOPY (EGD) with varices banding;  Surgeon: Gregory Gabriel MD;  Location: [facility]     GYN SURGERY      bilat fallopian tubes and ovaries removed     PICC DOUBLE LUMEN PLACEMENT Right 2021    42cm (2cm external), Lateral brachial vein     RETURN LIVER TRANSPLANT N/A 2021    Procedure: CLOSURE, WOUND, ABDOMEN, SECONDARY, ABDOMINAL WASHOUT;  Surgeon: Ernesto Schmitt MD;  Location: [facility]     TRANSPLANT LIVER RECIPIENT  DONOR N/A 2021    Procedure: Opening of abdomen, Abdominal Exploration and aborted liver transplant.;  Surgeon: Ernesto Schmitt MD;  Location: [facility]     TRANSPLANT LIVER RECIPIENT  DONOR N/A 2021    Procedure: TRANSPLANT, LIVER, RECIPIENT,  DONOR;  Surgeon: Ernesto Schmitt MD;  Location: [facility]     Social History     Socioeconomic History     Marital status:      Spouse name: Not on file     Number of children: Not on file     Years of education: Not on file     Highest education level: Not on file   Occupational History     Not on file   Tobacco Use     Smoking status: Never Smoker     Smokeless tobacco: Never Used   Vaping Use     Vaping Use: Never used   Substance and Sexual Activity     Alcohol use: No     Comment:  Last drink was 2017     Drug use: No     Sexual activity: Not Currently     Partners: Male   Other Topics Concern     Parent/sibling w/ CABG, MI or angioplasty before 65F 55M? Not Asked   Social History Narrative     Not on file     Social Determinants of Health     Financial Resource Strain: Not on file   Food Insecurity: Not on file   Transportation Needs: Not on file   Physical Activity: Not on file   Stress: Not on file   Social Connections: Not on file   Intimate Partner Violence: Not on file   Housing Stability: Not on file     Prescription Medications as of 1/10/2022       Rx Number Disp Refills Start End Last Dispensed Date Next Fill Date Owning Pharmacy    aspirin (ASA) 81 MG EC tablet    1/7/2022        Sig: Take 1 tablet (81 mg) by mouth daily    Class: Historical    Route: Oral    Gauze Pads & Dressings (CURITY IODOFORM PACKING STRIP) MISC  4 each 3 12/17/2021    [facility] [address]    Sig: Pack abdominal wound twice daily    Class: E-Prescribe    magnesium oxide (MAG-OX) 400 MG tablet  180 tablet 3 11/30/2021    [facility] [address] [place] [address] [address] [place] [address]    Sig: Take 1 tablet (400 mg) by mouth 2 times daily    Class: E-Prescribe    Route: Oral    mirtazapine (REMERON) 15 MG tablet  30 tablet 0 12/27/2021    [facility] [address] [place] [address] [address] [place] [address]    Sig: Take 1 tablet (15 mg) by mouth At Bedtime    Class: E-Prescribe    Route: Oral    multivitamin (CENTRUM SILVER) tablet            Sig: Take 1 tablet by mouth daily    Class: Historical    Route: Oral    mycophenolate (GENERIC EQUIVALENT) 250 MG capsule  540 capsule 3 11/30/2021    [facility] [address] [place] [address] [address] [place] [address]    Sig: Take 3 capsules (750 mg) by mouth 2 times daily    Class: E-Prescribe    Notes to Pharmacy: CIARA PICHARDO 11/18/2021  (Liver) TXP Dischg DT 11/27/2021 DX Liver replaced by transplant Z94.4 [facility] [place] ([place], [place])    Route: Oral    pantoprazole (PROTONIX) 40 MG EC tablet  180 tablet 3 11/30/2021    [facility] [place] [address] [address] [place] [address]    Sig: Take 1 tablet (40 mg) by mouth 2 times daily    Class: E-Prescribe    Route: Oral    sulfamethoxazole-trimethoprim (BACTRIM) 400-80 MG tablet  90 tablet 3 11/30/2021    [facility] [address] [place] [address] [address] [place] [address]    Sig: Take 1 tablet by mouth daily    Class: E-Prescribe    Route: Oral    tacrolimus (GENERIC EQUIVALENT) 1 MG capsule  90 capsule 3 1/4/2022    [facility] [address] [place] [address] [address] [place] [address]    Sig: Take 1 capsule (1 mg) by mouth every evening Total dose 5mg am, 6mg pm    Class: E-Prescribe    Notes to Pharmacy: Dose change    Route: Oral    tacrolimus (GENERIC EQUIVALENT) 5 MG capsule  180 capsule 3 1/4/2022    [facility] [place] [address] [address] [place] [address]    Sig: Take 1 capsule (5 mg) by mouth every 12 hours Total dose 5mg am, 6mg pm    Class: E-Prescribe    Notes to Pharmacy: Dose change    Route: Oral    ursodiol (ACTIGALL) 300 MG capsule  180 capsule 3 11/30/2021    [facility] [address] [place] [address] [address] [place] [address]    Sig: Take 1 capsule (300 mg) by mouth 2 times daily    Class: E-Prescribe    Route: Oral    valGANciclovir (VALCYTE) 450 MG tablet  90 tablet 3 11/30/2021    [facility] [place] [address] [address] [place] [address]    Sig: Take 1 tablet (450 mg) by mouth daily    Class: E-Prescribe    Route: Oral        Diagnostic x-ray materials and Contrast dye   REVIEW OF SYSTEMS (check box if normal)  [x]                GENERAL  [x]                 PULMONARY [x]                GENITOURINARY  [x]                CNS                 [x]                 CARDIAC  [x]                 ENDOCRINE  [x]                EARS,NOSE,THROAT [x]                 GASTROINTESTINAL [x]                 NEUROLOGIC    [x]                MUSCLOSKELTAL  [x]                  HEMATOLOGY      PHYSICAL EXAM (check box if normal)BP (!) 185/104   Pulse 99   Wt 62.8 kg (138 lb 6.4 oz)   LMP  (LMP Unknown)   SpO2 100%   BMI 23.03 kg/m      @txexam@    [x]            GENERAL:    [x]       EYES:  ICTERIC   []        YES  []                    NO  [x]           EXTREMITIES: Clubbing []       Y     [x]           N    [x]           EARS, NOSE, THROAT: Membranes Moist    YES   [x]                   NO []                  [x]           LUNGS:  CLEAR    YES       [x]                  NO    []                                [x]           SKIN: Jaundice           YES       []                  NO    [x]                   Rash: YES       []                  NO    [x]                                     [x]             HEART: Regular Rate          YES       [x]                  NO    []                   Incision Clean:  YES       [x]                  NO    []                                [x]                    ABDOMEN: Organomegaly YES       []                  NO    [x]                       [x]                    NEUROLOGICAL:  Nonfocal  YES       [x]                  NO    []                       [x]                    Hernia YES       []                  NO    [x]                   PSYCHIATRIC:  Appropriate  YES       [x]                  NO    []                       OTHER:                                                                                                   PAIN SCALE:: 2

## 2022-01-10 NOTE — LETTER
1/10/2022     RE: Berta Nava  [address] [facility] [id number]    Dear Colleague,    Thank you for referring your patient, Berta Nava, to the [facility]PL[facility]. Please see a copy of my visit note below.    Transplant Surgery -OUTPATIENT IMMUNOSUPPRESSION PROGRESS NOTE    Date of Visit: 01/10/2022    Transplants:  11/18/2021 (Liver); Postoperative day:  53  ASSESMENT AND PLAN:  1.Graft Function:Liver allograft: no rejection or technical problems.    2.Immunosuppression Management: keep tacrolimus levels around 8 ng/dL  3.Hypertension: needs better control, to see PCP  4.Renal Function: ok  5.Lab frequency: weekly  6.Other:  Wound improving    Date: January 10, 2022    Transplant:  [x]                             Liver []                              Kidney []                             Pancreas []                              Other:             Chief Complaint:RECHECK (2 week follow up)    History of Present Illness:  Patient Active Problem List   Diagnosis     Cholangitis     Hypokalemia     Chronic fatigue     Essential hypertension     Hyperlipidemia     Mixed hearing loss, bilateral     Neoplasm of uncertain behavior of skin     PSC (primary sclerosing cholangitis)     Sicca syndrome (H)     Ulcerative pancolitis with complication (H)     Unsatisfactory cervical Papanicolaou smear     Cirrhosis of liver with ascites (H)     Sepsis (H)     SBP (spontaneous bacterial peritonitis) (H)     Choledocholithiasis     Encounter for replacement of biliary stent     Secondary esophageal varices without bleeding (H)     Primary sclerosing cholangitis     Liver transplant planned     Liver transplant candidate     Acute kidney failure, unspecified (H)     Severe protein-calorie malnutrition (H)     Encephalopathy     Hyperammonemia (H)     Leukocytosis, unspecified type     Hepatic encephalopathy (H)     Chronic liver failure without hepatic coma (H)     Immunosuppressed status (H)      S/P liver transplant (H)     Anemia due to chronic kidney disease     SOCIAL /FAMILY HISTORY: [x]                  No recent change    Past Medical History:   Diagnosis Date     Ascites      Biliary cirrhosis (H)      Cholangitis, sclerosing      Cirrhosis of liver with ascites (H) 3/3/2021     Hearing loss of left ear     wears a hearing aide     History of low potassium      Hyperlipidemia      Hypertension      SBP (spontaneous bacterial peritonitis) (H) 4/30/2021     Sjogren's syndrome (H)      Ulcerative colitis (H)      Ulcerative pancolitis (H)      Past Surgical History:   Procedure Laterality Date     BENCH LIVER N/A 11/18/2021    Procedure: Bench liver;  Surgeon: Ernesto Schmitt MD;  [facility]     CV CORONARY ANGIOGRAM N/A 8/25/2021    Procedure: Coronary Angiogram with possible intervention;  Surgeon: David Wilhelm MD;  Location:  HEART CARDIAC CATH LAB     ENDOSCOPIC RETROGRADE CHOLANGIOPANCREATOGRAM N/A 6/25/2021    Procedure: ENDOSCOPIC RETROGRADE CHOLANGIOPANCREATOGRAPHY with ballon sweep of bile ducts for stones, balloon dilation of bile ducts and bile duct stent placement;  Surgeon: Gregory Gabriel MD;  [facility]     ENDOSCOPIC RETROGRADE CHOLANGIOPANCREATOGRAM N/A 7/22/2021    Procedure: ENDOSCOPIC RETROGRADE CHOLANGIOPANCREATOGRAPHY STONE REMOVAL, DILATION AND STENT PLACEMENT;  Surgeon: Gregory Gabriel MD;  Location:  OR     ENDOSCOPIC RETROGRADE CHOLANGIOPANCREATOGRAPHY, EXCHANGE TUBE/STENT N/A 05/05/2021    Procedure: ENDOSCOPIC RETROGRADE CHOLANGIOPANCREATOGRAPHY WITH STENT EXCHANGE, STONE EXTRACTION, AND DILATION;  Surgeon: Gregory Gabriel MD;  [facility]     ENDOSCOPIC RETROGRADE CHOLANGIOPANCREATOGRAPHY, EXCHANGE TUBE/STENT N/A 5/10/2021    Procedure: ENDOSCOPIC RETROGRADE CHOLANGIOPANCREATOGRAPHY with biliary dilation, stone removal, stent exchange;  Surgeon: Gregory Gabriel MD;  [facility]     ENDOSCOPIC RETROGRADE CHOLANGIOPANCREATOGRAPHY, EXCHANGE  TUBE/STENT N/A 6/10/2021    Procedure: ENDOSCOPIC RETROGRADE CHOLANGIOPANCREATOGRAPHY, WITH biliary stent exchange, stone removal;  Surgeon: Woodrow Lott MD;  [facility]     ENDOSCOPIC RETROGRADE CHOLANGIOPANCREATOGRAPHY, EXCHANGE TUBE/STENT N/A 2021    Procedure: ENDOSCOPIC RETROGRADE CHOLANGIOPANCREATOGRAPHY with biliary dilation, stone removal, stent exchange;  Surgeon: Gregory Gabriel MD;  [facility]     ENDOSCOPIC RETROGRADE CHOLANGIOPANCREATOGRAPHY, EXCHANGE TUBE/STENT N/A 10/1/2021    Procedure: ENDOSCOPIC RETROGRADE CHOLANGIOPANCREATOGRAPHY with balloon sweep of bile ducts, bile duct stent exchanged;  Surgeon: Gregory Gabriel MD;  [facility]     ESOPHAGOSCOPY, GASTROSCOPY, DUODENOSCOPY (EGD), COMBINED N/A 2021    Procedure: ESOPHAGOGASTRODUODENOSCOPY (EGD);  Surgeon: Leventhal, Thomas Michael, MD;  [facility]     ESOPHAGOSCOPY, GASTROSCOPY, DUODENOSCOPY (EGD), COMBINED N/A 10/1/2021    Procedure: ESOPHAGOGASTRODUODENOSCOPY (EGD) with varices banding;  Surgeon: Gregory Gabriel MD;  [facility]     GYN SURGERY      bilat fallopian tubes and ovaries removed     PICC DOUBLE LUMEN PLACEMENT Right 2021    42cm (2cm external), Lateral brachial vein     RETURN LIVER TRANSPLANT N/A 2021    Procedure: CLOSURE, WOUND, ABDOMEN, SECONDARY, ABDOMINAL WASHOUT;  Surgeon: Ernesto Schmitt MD;  [facility]     TRANSPLANT LIVER RECIPIENT  DONOR N/A 2021    Procedure: Opening of abdomen, Abdominal Exploration and aborted liver transplant.;  Surgeon: Ernesto Schmitt MD;  [facility]     TRANSPLANT LIVER RECIPIENT  DONOR N/A 2021    Procedure: TRANSPLANT, LIVER, RECIPIENT,  DONOR;  Surgeon: Ernesto Schmitt MD;  [facility]     Social History     Socioeconomic History     Marital status:      Spouse name: Not on file     Number of children: Not on file     Years of education: Not on file     Highest education  level: Not on file   Occupational History     Not on file   Tobacco Use     Smoking status: Never Smoker     Smokeless tobacco: Never Used   Vaping Use     Vaping Use: Never used   Substance and Sexual Activity     Alcohol use: No     Comment: Last drink was 2017     Drug use: No     Sexual activity: Not Currently     Partners: Male   Other Topics Concern     Parent/sibling w/ CABG, MI or angioplasty before 65F 55M? Not Asked   Social History Narrative     Not on file     Social Determinants of Health     Financial Resource Strain: Not on file   Food Insecurity: Not on file   Transportation Needs: Not on file   Physical Activity: Not on file   Stress: Not on file   Social Connections: Not on file   Intimate Partner Violence: Not on file   Housing Stability: Not on file     Prescription Medications as of 1/10/2022       Rx Number Disp Refills Start End Last Dispensed Date Next Fill Date Owning Pharmacy    aspirin (ASA) 81 MG EC tablet    1/7/2022        Sig: Take 1 tablet (81 mg) by mouth daily    Class: Historical    Route: Oral    Gauze Pads & Dressings (CURITY IODOFORM PACKING STRIP) MISC  4 each 3 12/17/2021    [address]    Sig: Pack abdominal wound twice daily    Class: E-Prescribe    magnesium oxide (MAG-OX) 400 MG tablet  180 tablet 3 11/30/2021    [facility] [address] [place] [address] [address] [place] [address]    Sig: Take 1 tablet (400 mg) by mouth 2 times daily    Class: E-Prescribe    Route: Oral    mirtazapine (REMERON) 15 MG tablet  30 tablet 0 12/27/2021    [facility] [address] [place] [address] [address] [place] [address]    Sig: Take 1 tablet (15 mg) by mouth At Bedtime    Class: E-Prescribe    Route: Oral    multivitamin (CENTRUM SILVER) tablet            Sig: Take 1 tablet by mouth daily    Class: Historical    Route: Oral    mycophenolate (GENERIC EQUIVALENT) 250 MG capsule   540 capsule 3 11/30/2021    [facility] #[id number] [place] [address] [address] [place] [address]    Sig: Take 3 capsules (750 mg) by mouth 2 times daily    Class: E-Prescribe    Notes to Pharmacy: TXP DT 11/18/2021 (Liver) TXP Dischg DT 11/27/2021 DX Liver replaced by transplant Z94.4 TX Center [facility] ([place], [place])    Route: Oral    pantoprazole (PROTONIX) 40 MG EC tablet  180 tablet 3 11/30/2021    [facility] STORE #[id number] [place] [address] [address] [place] [address]    Sig: Take 1 tablet (40 mg) by mouth 2 times daily    Class: E-Prescribe    Route: Oral    sulfamethoxazole-trimethoprim (BACTRIM) 400-80 MG tablet  90 tablet 3 11/30/2021    [facility] STORE [address] [place] [address] [address] [place] [address]    Sig: Take 1 tablet by mouth daily    Class: E-Prescribe    Route: Oral    tacrolimus (GENERIC EQUIVALENT) 1 MG capsule  90 capsule 3 1/4/2022    [facility] STORE [address] [place] [address] [address] [place] [address]    Sig: Take 1 capsule (1 mg) by mouth every evening Total dose 5mg am, 6mg pm    Class: E-Prescribe    Notes to Pharmacy: Dose change    Route: Oral    tacrolimus (GENERIC EQUIVALENT) 5 MG capsule  180 capsule 3 1/4/2022    [facility] STORE #[id number] [place] [address] [address] [place] [address]    Sig: Take 1 capsule (5 mg) by mouth every 12 hours Total dose 5mg am, 6mg pm    Class: E-Prescribe    Notes to Pharmacy: Dose change    Route: Oral    ursodiol (ACTIGALL) 300 MG capsule  180 capsule 3 11/30/2021    [facility] [address] [place] [address] [address] [place] [address]    Sig: Take 1 capsule (300 mg) by mouth 2 times daily    Class: E-Prescribe    Route: Oral    valGANciclovir (VALCYTE) 450 MG tablet  90 tablet 3 11/30/2021    [facility] DR[facility] STORE #[id number] - [address]  [address] [facility] [address]    Sig: Take 1 tablet (450 mg) by mouth daily    Class: E-Prescribe    Route: Oral        Diagnostic x-ray materials and Contrast dye   REVIEW OF SYSTEMS (check box if normal)  [x]               GENERAL  [x]                 PULMONARY [x]                GENITOURINARY  [x]                CNS                 [x]                 CARDIAC  [x]                 ENDOCRINE  [x]                EARS,NOSE,THROAT [x]                 GASTROINTESTINAL [x]                 NEUROLOGIC    [x]                MUSCLOSKELTAL  [x]                  HEMATOLOGY      PHYSICAL EXAM (check box if normal)BP (!) 185/104   Pulse 99   Wt 62.8 kg (138 lb 6.4 oz)   LMP  (LMP Unknown)   SpO2 100%   BMI 23.03 kg/m      @txexam@    [x]            GENERAL:    [x]       EYES:  ICTERIC   []        YES  []                    NO  [x]           EXTREMITIES: Clubbing []       Y     [x]           N    [x]           EARS, NOSE, THROAT: Membranes Moist    YES   [x]                   NO []                  [x]           LUNGS:  CLEAR    YES       [x]                  NO    []                                [x]           SKIN: Jaundice           YES       []                  NO    [x]                   Rash: YES       []                  NO    [x]                                     [x]             HEART: Regular Rate          YES       [x]                  NO    []                   Incision Clean:  YES       [x]                  NO    []                                [x]                    ABDOMEN: Organomegaly YES       []                  NO    [x]                       [x]                    NEUROLOGICAL:  Nonfocal  YES       [x]                  NO    []                       [x]                    Hernia YES       []                  NO    [x]                   PSYCHIATRIC:  Appropriate  YES       [x]                  NO    []                       OTHER:                                                                                                    PAIN SCALE:: 2    Again, thank you for allowing me to participate in the care of your patient.      Sincerely,    Ernesto Schmitt MD

## 2022-01-17 ENCOUNTER — TELEPHONE (OUTPATIENT)
Dept: TRANSPLANT | Facility: CLINIC | Age: 57
End: 2022-01-17

## 2022-01-17 ENCOUNTER — LAB (OUTPATIENT)
Dept: LAB | Facility: CLINIC | Age: 57
End: 2022-01-17
Payer: COMMERCIAL

## 2022-01-17 DIAGNOSIS — Z94.4 STATUS POST LIVER TRANSPLANTATION (H): Primary | ICD-10-CM

## 2022-01-17 DIAGNOSIS — Z94.4 LIVER REPLACED BY TRANSPLANT (H): ICD-10-CM

## 2022-01-17 LAB
ALBUMIN SERPL-MCNC: 3.5 G/DL (ref 3.4–5)
ALP SERPL-CCNC: 78 U/L (ref 40–150)
ALT SERPL W P-5'-P-CCNC: 21 U/L (ref 0–50)
ANION GAP SERPL CALCULATED.3IONS-SCNC: 8 MMOL/L (ref 3–14)
AST SERPL W P-5'-P-CCNC: 15 U/L (ref 0–45)
BILIRUB DIRECT SERPL-MCNC: 0.2 MG/DL (ref 0–0.2)
BILIRUB SERPL-MCNC: 0.4 MG/DL (ref 0.2–1.3)
BUN SERPL-MCNC: 26 MG/DL (ref 7–30)
CALCIUM SERPL-MCNC: 8.9 MG/DL (ref 8.5–10.1)
CHLORIDE BLD-SCNC: 111 MMOL/L (ref 94–109)
CO2 SERPL-SCNC: 20 MMOL/L (ref 20–32)
CREAT SERPL-MCNC: 0.98 MG/DL (ref 0.52–1.04)
ERYTHROCYTE [DISTWIDTH] IN BLOOD BY AUTOMATED COUNT: 15.9 % (ref 10–15)
GFR SERPL CREATININE-BSD FRML MDRD: 67 ML/MIN/1.73M2
GLUCOSE BLD-MCNC: 96 MG/DL (ref 70–99)
HCT VFR BLD AUTO: 33.1 % (ref 35–47)
HGB BLD-MCNC: 10.4 G/DL (ref 11.7–15.7)
MAGNESIUM SERPL-MCNC: 1.6 MG/DL (ref 1.6–2.3)
MCH RBC QN AUTO: 28.2 PG (ref 26.5–33)
MCHC RBC AUTO-ENTMCNC: 31.4 G/DL (ref 31.5–36.5)
MCV RBC AUTO: 90 FL (ref 78–100)
PHOSPHATE SERPL-MCNC: 4.6 MG/DL (ref 2.5–4.5)
PLATELET # BLD AUTO: 218 10E3/UL (ref 150–450)
POTASSIUM BLD-SCNC: 3.9 MMOL/L (ref 3.4–5.3)
PROT SERPL-MCNC: 6.7 G/DL (ref 6.8–8.8)
RBC # BLD AUTO: 3.69 10E6/UL (ref 3.8–5.2)
SODIUM SERPL-SCNC: 139 MMOL/L (ref 133–144)
TACROLIMUS BLD-MCNC: 8.6 UG/L (ref 5–15)
TME LAST DOSE: NORMAL H
TME LAST DOSE: NORMAL H
WBC # BLD AUTO: 5.9 10E3/UL (ref 4–11)

## 2022-01-17 PROCEDURE — 83735 ASSAY OF MAGNESIUM: CPT

## 2022-01-17 PROCEDURE — 36415 COLL VENOUS BLD VENIPUNCTURE: CPT

## 2022-01-17 PROCEDURE — 80053 COMPREHEN METABOLIC PANEL: CPT

## 2022-01-17 PROCEDURE — 82248 BILIRUBIN DIRECT: CPT

## 2022-01-17 PROCEDURE — 85027 COMPLETE CBC AUTOMATED: CPT

## 2022-01-17 PROCEDURE — 84100 ASSAY OF PHOSPHORUS: CPT

## 2022-01-17 PROCEDURE — 80197 ASSAY OF TACROLIMUS: CPT

## 2022-01-17 RX ORDER — VALGANCICLOVIR 450 MG/1
450 TABLET, FILM COATED ORAL DAILY
Qty: 90 TABLET | Refills: 3 | Status: SHIPPED | OUTPATIENT
Start: 2022-01-17 | End: 2022-04-21

## 2022-01-17 RX ORDER — PANTOPRAZOLE SODIUM 40 MG/1
40 TABLET, DELAYED RELEASE ORAL DAILY
Qty: 30 TABLET | Refills: 3 | Status: SHIPPED | OUTPATIENT
Start: 2022-01-17 | End: 2022-04-21

## 2022-01-17 NOTE — TELEPHONE ENCOUNTER
Call to Berta to check in. She is doing well.  She is relieved to have a difficult year behind her.   She can hardly get any packing into her wound. No drainage.  Appetite: good, weight stable.  Appetite stimulant discontinued last week.   Diarrhea:  None, having 1-2 stools /day.   Nausea: none  Activity: tries to get outside, being very careful about COVID exposure.  Pain management:  CONRADO  Physician follow-up: Bruce 1/24, Leventhal 2/24.  Stent:  Per [facility] op note biliary stent was placed at time of transplant.  Stent was present on CT 12/13.  I placed an order for an abdominal film to be done when she returns to clinic next week to see if still present.    Lab frequency: weekly  Med changes: decreased protonix to daily.  Was only taking Mag only daily, recent mag is 1.6, told her she can stop.  She is aware that she will need to restart prednisone 5 mg daily when her wound is  healed.     Reminded her that she needs to see her PCP every 6 mos for this year.  She has had basal cell skin cancers, is aware that she needs to see a dermatologist annually.

## 2022-01-24 ENCOUNTER — OFFICE VISIT (OUTPATIENT)
Dept: TRANSPLANT | Facility: CLINIC | Age: 57
End: 2022-01-24
Attending: TRANSPLANT SURGERY
Payer: COMMERCIAL

## 2022-01-24 ENCOUNTER — HOSPITAL ENCOUNTER (OUTPATIENT)
Facility: CLINIC | Age: 57
End: 2022-01-24
Attending: INTERNAL MEDICINE | Admitting: INTERNAL MEDICINE
Payer: COMMERCIAL

## 2022-01-24 ENCOUNTER — LAB (OUTPATIENT)
Dept: LAB | Facility: CLINIC | Age: 57
End: 2022-01-24
Attending: TRANSPLANT SURGERY
Payer: COMMERCIAL

## 2022-01-24 ENCOUNTER — ANCILLARY PROCEDURE (OUTPATIENT)
Dept: GENERAL RADIOLOGY | Facility: CLINIC | Age: 57
End: 2022-01-24
Attending: TRANSPLANT SURGERY
Payer: COMMERCIAL

## 2022-01-24 ENCOUNTER — TELEPHONE (OUTPATIENT)
Dept: GASTROENTEROLOGY | Facility: CLINIC | Age: 57
End: 2022-01-24

## 2022-01-24 VITALS — WEIGHT: 136.9 LBS | BODY MASS INDEX: 22.78 KG/M2

## 2022-01-24 DIAGNOSIS — Z94.4 LIVER REPLACED BY TRANSPLANT (H): ICD-10-CM

## 2022-01-24 DIAGNOSIS — Z94.4 LIVER TRANSPLANTED (H): Primary | ICD-10-CM

## 2022-01-24 DIAGNOSIS — Z94.4 STATUS POST LIVER TRANSPLANTATION (H): ICD-10-CM

## 2022-01-24 DIAGNOSIS — Z11.59 ENCOUNTER FOR SCREENING FOR OTHER VIRAL DISEASES: Primary | ICD-10-CM

## 2022-01-24 LAB
ALBUMIN SERPL-MCNC: 3.7 G/DL (ref 3.4–5)
ALP SERPL-CCNC: 81 U/L (ref 40–150)
ALT SERPL W P-5'-P-CCNC: 20 U/L (ref 0–50)
ANION GAP SERPL CALCULATED.3IONS-SCNC: 12 MMOL/L (ref 3–14)
AST SERPL W P-5'-P-CCNC: 16 U/L (ref 0–45)
BILIRUB DIRECT SERPL-MCNC: 0.1 MG/DL (ref 0–0.2)
BILIRUB SERPL-MCNC: 0.4 MG/DL (ref 0.2–1.3)
BUN SERPL-MCNC: 27 MG/DL (ref 7–30)
CALCIUM SERPL-MCNC: 9.1 MG/DL (ref 8.5–10.1)
CHLORIDE BLD-SCNC: 110 MMOL/L (ref 94–109)
CO2 SERPL-SCNC: 21 MMOL/L (ref 20–32)
CREAT SERPL-MCNC: 0.87 MG/DL (ref 0.52–1.04)
ERYTHROCYTE [DISTWIDTH] IN BLOOD BY AUTOMATED COUNT: 14.6 % (ref 10–15)
GFR SERPL CREATININE-BSD FRML MDRD: 77 ML/MIN/1.73M2
GLUCOSE BLD-MCNC: 88 MG/DL (ref 70–99)
HCT VFR BLD AUTO: 32.3 % (ref 35–47)
HGB BLD-MCNC: 10 G/DL (ref 11.7–15.7)
MAGNESIUM SERPL-MCNC: 1.8 MG/DL (ref 1.6–2.3)
MCH RBC QN AUTO: 27.1 PG (ref 26.5–33)
MCHC RBC AUTO-ENTMCNC: 31 G/DL (ref 31.5–36.5)
MCV RBC AUTO: 88 FL (ref 78–100)
PHOSPHATE SERPL-MCNC: 5 MG/DL (ref 2.5–4.5)
PLATELET # BLD AUTO: 176 10E3/UL (ref 150–450)
POTASSIUM BLD-SCNC: 4.2 MMOL/L (ref 3.4–5.3)
PROT SERPL-MCNC: 6.8 G/DL (ref 6.8–8.8)
RBC # BLD AUTO: 3.69 10E6/UL (ref 3.8–5.2)
SODIUM SERPL-SCNC: 143 MMOL/L (ref 133–144)
TACROLIMUS BLD-MCNC: 6.9 UG/L (ref 5–15)
TME LAST DOSE: NORMAL H
TME LAST DOSE: NORMAL H
WBC # BLD AUTO: 5.1 10E3/UL (ref 4–11)

## 2022-01-24 PROCEDURE — 74019 RADEX ABDOMEN 2 VIEWS: CPT | Performed by: STUDENT IN AN ORGANIZED HEALTH CARE EDUCATION/TRAINING PROGRAM

## 2022-01-24 PROCEDURE — 83735 ASSAY OF MAGNESIUM: CPT | Performed by: PATHOLOGY

## 2022-01-24 PROCEDURE — 84100 ASSAY OF PHOSPHORUS: CPT | Performed by: PATHOLOGY

## 2022-01-24 PROCEDURE — 99213 OFFICE O/P EST LOW 20 MIN: CPT | Mod: 24 | Performed by: TRANSPLANT SURGERY

## 2022-01-24 PROCEDURE — 80053 COMPREHEN METABOLIC PANEL: CPT | Performed by: PATHOLOGY

## 2022-01-24 PROCEDURE — 85027 COMPLETE CBC AUTOMATED: CPT | Performed by: PATHOLOGY

## 2022-01-24 PROCEDURE — 36415 COLL VENOUS BLD VENIPUNCTURE: CPT | Performed by: PATHOLOGY

## 2022-01-24 PROCEDURE — 80197 ASSAY OF TACROLIMUS: CPT | Performed by: TRANSPLANT SURGERY

## 2022-01-24 PROCEDURE — 82248 BILIRUBIN DIRECT: CPT | Performed by: PATHOLOGY

## 2022-01-24 NOTE — PROGRESS NOTES
Transplant Surgery -OUTPATIENT IMMUNOSUPPRESSION PROGRESS NOTE    Date of Visit: 01/24/2022    Transplants:  11/18/2021 (Liver); Postoperative day:  67  ASSESMENT AND PLAN:  1.Graft Function:Liver allograft: no rejection or technical problems.    2.Immunosuppression Management: keep tacrolimus levels at 5-8 ng/dL  3.Hypertension:ok  4.Renal Function:better  5.Lab frequency:  6.Other:  Wound improving    Date: January 24, 2022    Transplant:  [x]                             Liver [x]                              Kidney []                             Pancreas []                              Other:             Chief Complaint:  Doing well, getting bored in the house, and the wound has much improved.  History of Present Illness:  Patient Active Problem List   Diagnosis     Cholangitis     Hypokalemia     Chronic fatigue     Essential hypertension     Hyperlipidemia     Mixed hearing loss, bilateral     Neoplasm of uncertain behavior of skin     PSC (primary sclerosing cholangitis)     Sicca syndrome (H)     Ulcerative pancolitis with complication (H)     Unsatisfactory cervical Papanicolaou smear     Cirrhosis of liver with ascites (H)     Sepsis (H)     SBP (spontaneous bacterial peritonitis) (H)     Choledocholithiasis     Encounter for replacement of biliary stent     Secondary esophageal varices without bleeding (H)     Primary sclerosing cholangitis     Liver transplant planned     Liver transplant candidate     Acute kidney failure, unspecified (H)     Severe protein-calorie malnutrition (H)     Encephalopathy     Hyperammonemia (H)     Leukocytosis, unspecified type     Hepatic encephalopathy (H)     Chronic liver failure without hepatic coma (H)     Immunosuppressed status (H)     S/P liver transplant (H)     Anemia due to chronic kidney disease     SOCIAL /FAMILY HISTORY: [x]                  No recent change    Past Medical History:   Diagnosis Date     Ascites      Biliary cirrhosis (H)      Cholangitis,  sclerosing      Cirrhosis of liver with ascites (H) 3/3/2021     Hearing loss of left ear     wears a hearing aide     History of low potassium      Hyperlipidemia      Hypertension      SBP (spontaneous bacterial peritonitis) (H) 4/30/2021     Sjogren's syndrome (H)      Ulcerative colitis (H)      Ulcerative pancolitis (H)      Past Surgical History:   Procedure Laterality Date     BENCH LIVER N/A 11/18/2021    Procedure: Bench liver;  Surgeon: Ernesto Schmitt MD;  [facility]     CV CORONARY ANGIOGRAM N/A 8/25/2021    Procedure: Coronary Angiogram with possible intervention;  Surgeon: David Wilhelm MD;  Location:  HEART CARDIAC CATH LAB     ENDOSCOPIC RETROGRADE CHOLANGIOPANCREATOGRAM N/A 6/25/2021    Procedure: ENDOSCOPIC RETROGRADE CHOLANGIOPANCREATOGRAPHY with ballon sweep of bile ducts for stones, balloon dilation of bile ducts and bile duct stent placement;  Surgeon: Gregory Gabriel MD;  [facility]     ENDOSCOPIC RETROGRADE CHOLANGIOPANCREATOGRAM N/A 7/22/2021    Procedure: ENDOSCOPIC RETROGRADE CHOLANGIOPANCREATOGRAPHY STONE REMOVAL, DILATION AND STENT PLACEMENT;  Surgeon: Gregory Gabriel MD;  Location:  OR     ENDOSCOPIC RETROGRADE CHOLANGIOPANCREATOGRAPHY, EXCHANGE TUBE/STENT N/A 05/05/2021    Procedure: ENDOSCOPIC RETROGRADE CHOLANGIOPANCREATOGRAPHY WITH STENT EXCHANGE, STONE EXTRACTION, AND DILATION;  Surgeon: Gregory Gabriel MD;  [facility]     ENDOSCOPIC RETROGRADE CHOLANGIOPANCREATOGRAPHY, EXCHANGE TUBE/STENT N/A 5/10/2021    Procedure: ENDOSCOPIC RETROGRADE CHOLANGIOPANCREATOGRAPHY with biliary dilation, stone removal, stent exchange;  Surgeon: Gregory Gabriel MD;  [facility]     ENDOSCOPIC RETROGRADE CHOLANGIOPANCREATOGRAPHY, EXCHANGE TUBE/STENT N/A 6/10/2021    Procedure: ENDOSCOPIC RETROGRADE CHOLANGIOPANCREATOGRAPHY, WITH biliary stent exchange, stone removal;  Surgeon: Woodrow Lott MD;  [facility]     ENDOSCOPIC RETROGRADE  CHOLANGIOPANCREATOGRAPHY, EXCHANGE TUBE/STENT N/A 2021    Procedure: ENDOSCOPIC RETROGRADE CHOLANGIOPANCREATOGRAPHY with biliary dilation, stone removal, stent exchange;  Surgeon: Gregory Gabriel MD;  [facility]     ENDOSCOPIC RETROGRADE CHOLANGIOPANCREATOGRAPHY, EXCHANGE TUBE/STENT N/A 10/1/2021    Procedure: ENDOSCOPIC RETROGRADE CHOLANGIOPANCREATOGRAPHY with balloon sweep of bile ducts, bile duct stent exchanged;  Surgeon: Gregory Gabriel MD;  [facility]     ESOPHAGOSCOPY, GASTROSCOPY, DUODENOSCOPY (EGD), COMBINED N/A 2021    Procedure: ESOPHAGOGASTRODUODENOSCOPY (EGD);  Surgeon: Leventhal, Thomas Michael, MD;  [facility]     ESOPHAGOSCOPY, GASTROSCOPY, DUODENOSCOPY (EGD), COMBINED N/A 10/1/2021    Procedure: ESOPHAGOGASTRODUODENOSCOPY (EGD) with varices banding;  Surgeon: Gregory Gabriel MD;  [facility]     GYN SURGERY      bilat fallopian tubes and ovaries removed     PICC DOUBLE LUMEN PLACEMENT Right 2021    42cm (2cm external), Lateral brachial vein     RETURN LIVER TRANSPLANT N/A 2021    Procedure: CLOSURE, WOUND, ABDOMEN, SECONDARY, ABDOMINAL WASHOUT;  Surgeon: Ernesto Schmitt MD;  [facility]     TRANSPLANT LIVER RECIPIENT  DONOR N/A 2021    Procedure: Opening of abdomen, Abdominal Exploration and aborted liver transplant.;  Surgeon: Ernesto Schmitt MD;  [facility]     TRANSPLANT LIVER RECIPIENT  DONOR N/A 2021    Procedure: TRANSPLANT, LIVER, RECIPIENT,  DONOR;  Surgeon: Ernesto Schmitt MD;  [facility]     Social History     Socioeconomic History     Marital status:      Spouse name: Not on file     Number of children: Not on file     Years of education: Not on file     Highest education level: Not on file   Occupational History     Not on file   Tobacco Use     Smoking status: Never Smoker     Smokeless tobacco: Never Used   Vaping Use     Vaping Use: Never used   Substance and Sexual Activity      Alcohol use: No     Comment: Last drink was 2017     Drug use: No     Sexual activity: Not Currently     Partners: Male   Other Topics Concern     Parent/sibling w/ CABG, MI or angioplasty before 65F 55M? Not Asked   Social History Narrative     Not on file     Social Determinants of Health     Financial Resource Strain: Not on file   Food Insecurity: Not on file   Transportation Needs: Not on file   Physical Activity: Not on file   Stress: Not on file   Social Connections: Not on file   Intimate Partner Violence: Not on file   Housing Stability: Not on file     Prescription Medications as of 1/24/2022       Rx Number Disp Refills Start End Last Dispensed Date Next Fill Date Owning Pharmacy    aspirin (ASA) 81 MG EC tablet    1/7/2022        Sig: Take 1 tablet (81 mg) by mouth daily    Class: Historical    Route: Oral    multivitamin (CENTRUM SILVER) tablet            Sig: Take 1 tablet by mouth daily    Class: Historical    Route: Oral    mycophenolate (GENERIC EQUIVALENT) 250 MG capsule  540 capsule 3 11/30/2021    [facility] [address] [place] [address] [address] [place] [address]    Sig: Take 3 capsules (750 mg) by mouth 2 times daily    Class: E-Prescribe    Notes to Pharmacy: TXP DT 11/18/2021 (Liver) TXP Dischg DT 11/27/2021 DX Liver replaced by transplant Z94.4 TX Center [facility] ([place], [place])    Route: Oral    pantoprazole (PROTONIX) 40 MG EC tablet  30 tablet 3 1/17/2022    [facility] [place] [address] [address] [place] [address]    Sig: Take 1 tablet (40 mg) by mouth daily    Class: E-Prescribe    Route: Oral    sulfamethoxazole-trimethoprim (BACTRIM) 400-80 MG tablet  90 tablet 3 11/30/2021    [facility] [address] [place] [address] [address] [place] [address]    Sig: Take 1 tablet by mouth daily    Class: E-Prescribe    Route: Oral    tacrolimus (GENERIC  EQUIVALENT) 1 MG capsule  90 capsule 3 1/4/2022    [facility] STORE [id number] [place] [address] [address] [place] [address]    Sig: Take 1 capsule (1 mg) by mouth every evening Total dose 5mg am, 6mg pm    Class: E-Prescribe    Notes to Pharmacy: Dose change    Route: Oral    tacrolimus (GENERIC EQUIVALENT) 5 MG capsule  180 capsule 3 1/4/2022    [facility] STORE [address] [place] [address] [address] [place] [address]    Sig: Take 1 capsule (5 mg) by mouth every 12 hours Total dose 5mg am, 6mg pm    Class: E-Prescribe    Notes to Pharmacy: Dose change    Route: Oral    ursodiol (ACTIGALL) 300 MG capsule  180 capsule 3 11/30/2021    [facility] STORE [address] [place] [address] [address] [place] [address]    Sig: Take 1 capsule (300 mg) by mouth 2 times daily    Class: E-Prescribe    Route: Oral    valGANciclovir (VALCYTE) 450 MG tablet  90 tablet 3 1/17/2022 5/18/2022   [facility] STORE [address] [place] [address] [address] [place] [address]    Sig: Take 1 tablet (450 mg) by mouth daily    Class: E-Prescribe    Route: Oral        Diagnostic x-ray materials and Contrast dye   REVIEW OF SYSTEMS (check box if normal)  [x]               GENERAL  [x]                 PULMONARY [x]                GENITOURINARY  [x]                CNS                 [x]                 CARDIAC  [x]                 ENDOCRINE  [x]                EARS,NOSE,THROAT [x]                 GASTROINTESTINAL [x]                 NEUROLOGIC    [x]                MUSCLOSKELTAL  [x]                  HEMATOLOGY      PHYSICAL EXAM (check box if normal)Wt 62.1 kg (136 lb 14.4 oz)   LMP  (LMP Unknown)   Breastfeeding No   BMI 22.78 kg/m      @txexam@    [x]            GENERAL:    [x]       EYES:  ICTERIC   []        YES  []                    NO  [x]           EXTREMITIES: Clubbing []       Y     [x]           N    [x]           EARS, NOSE, THROAT: Membranes  Moist    YES   [x]                   NO []                  [x]           LUNGS:  CLEAR    YES       [x]                  NO    []                                [x]           SKIN: Jaundice           YES       []                  NO    [x]                   Rash: YES       []                  NO    [x]                                     [x]             HEART: Regular Rate          YES       [x]                  NO    []                   Incision Clean:  YES       [x]                  NO    []                                [x]                    ABDOMEN: Organomegaly YES       []                  NO    [x]                       [x]                    NEUROLOGICAL:  Nonfocal  YES       [x]                  NO    []                       [x]                    Hernia YES       []                  NO    [x]                   PSYCHIATRIC:  Appropriate  YES       [x]                  NO    []                       OTHER:                                                                                                   PAIN SCALE:: 3  HPI      ROS      Physical Exam

## 2022-01-24 NOTE — PROGRESS NOTES
Pt needs to have biliary stent removed, present on xray. Pt in clinic with dr barber. Message to patient to call endoscopy for setting up appt.

## 2022-01-24 NOTE — LETTER
1/24/2022     RE: Berta Nava  [address] [facility] [id number]    Dear Colleague,    Thank you for referring your patient, Berta Nava, to the [facility] TRANSPLANT [facility]. Please see a copy of my visit note below.    Transplant Surgery -OUTPATIENT IMMUNOSUPPRESSION PROGRESS NOTE    Date of Visit: 01/24/2022    Transplants:  11/18/2021 (Liver); Postoperative day:  67  ASSESMENT AND PLAN:  1.Graft Function:Liver allograft: no rejection or technical problems.    2.Immunosuppression Management: keep tacrolimus levels at 5-8 ng/dL  3.Hypertension:ok  4.Renal Function:better  5.Lab frequency:  6.Other:  Wound improving    Date: January 24, 2022    Transplant:  [x]                             Liver [x]                              Kidney []                             Pancreas []                              Other:             Chief Complaint:  Doing well, getting bored in the house, and the wound has much improved.  History of Present Illness:  Patient Active Problem List   Diagnosis     Cholangitis     Hypokalemia     Chronic fatigue     Essential hypertension     Hyperlipidemia     Mixed hearing loss, bilateral     Neoplasm of uncertain behavior of skin     PSC (primary sclerosing cholangitis)     Sicca syndrome (H)     Ulcerative pancolitis with complication (H)     Unsatisfactory cervical Papanicolaou smear     Cirrhosis of liver with ascites (H)     Sepsis (H)     SBP (spontaneous bacterial peritonitis) (H)     Choledocholithiasis     Encounter for replacement of biliary stent     Secondary esophageal varices without bleeding (H)     Primary sclerosing cholangitis     Liver transplant planned     Liver transplant candidate     Acute kidney failure, unspecified (H)     Severe protein-calorie malnutrition (H)     Encephalopathy     Hyperammonemia (H)     Leukocytosis, unspecified type     Hepatic encephalopathy (H)     Chronic liver failure without hepatic coma (H)     Immunosuppressed  status (H)     S/P liver transplant (H)     Anemia due to chronic kidney disease     SOCIAL /FAMILY HISTORY: [x]                  No recent change    Past Medical History:   Diagnosis Date     Ascites      Biliary cirrhosis (H)      Cholangitis, sclerosing      Cirrhosis of liver with ascites (H) 3/3/2021     Hearing loss of left ear     wears a hearing aide     History of low potassium      Hyperlipidemia      Hypertension      SBP (spontaneous bacterial peritonitis) (H) 4/30/2021     Sjogren's syndrome (H)      Ulcerative colitis (H)      Ulcerative pancolitis (H)      Past Surgical History:   Procedure Laterality Date     BENCH LIVER N/A 11/18/2021    Procedure: Bench liver;  Surgeon: Ernesto Schmitt MD;  Location: [facility] OR     CV CORONARY ANGIOGRAM N/A 8/25/2021    Procedure: Coronary Angiogram with possible intervention;  Surgeon: David Wilhelm MD;  Location:  HEART CARDIAC CATH LAB     ENDOSCOPIC RETROGRADE CHOLANGIOPANCREATOGRAM N/A 6/25/2021    Procedure: ENDOSCOPIC RETROGRADE CHOLANGIOPANCREATOGRAPHY with ballon sweep of bile ducts for stones, balloon dilation of bile ducts and bile duct stent placement;  Surgeon: Gregory Gabriel MD;  Location: [facility] OR     ENDOSCOPIC RETROGRADE CHOLANGIOPANCREATOGRAM N/A 7/22/2021    Procedure: ENDOSCOPIC RETROGRADE CHOLANGIOPANCREATOGRAPHY STONE REMOVAL, DILATION AND STENT PLACEMENT;  Surgeon: Gregory Gabriel MD;  Location:  OR     ENDOSCOPIC RETROGRADE CHOLANGIOPANCREATOGRAPHY, EXCHANGE TUBE/STENT N/A 05/05/2021    Procedure: ENDOSCOPIC RETROGRADE CHOLANGIOPANCREATOGRAPHY WITH STENT EXCHANGE, STONE EXTRACTION, AND DILATION;  Surgeon: Gregory Gabriel MD;  [facility]     ENDOSCOPIC RETROGRADE CHOLANGIOPANCREATOGRAPHY, EXCHANGE TUBE/STENT N/A 5/10/2021    Procedure: ENDOSCOPIC RETROGRADE CHOLANGIOPANCREATOGRAPHY with biliary dilation, stone removal, stent exchange;  Surgeon: Gregory Gabriel MD;  Location:  OR     ENDOSCOPIC RETROGRADE  CHOLANGIOPANCREATOGRAPHY, EXCHANGE TUBE/STENT N/A 6/10/2021    Procedure: ENDOSCOPIC RETROGRADE CHOLANGIOPANCREATOGRAPHY, WITH biliary stent exchange, stone removal;  Surgeon: Woodrow Lott MD;  [facility]: [facility]     ENDOSCOPIC RETROGRADE CHOLANGIOPANCREATOGRAPHY, EXCHANGE TUBE/STENT N/A 2021    Procedure: ENDOSCOPIC RETROGRADE CHOLANGIOPANCREATOGRAPHY with biliary dilation, stone removal, stent exchange;  Surgeon: Gregory Gabriel MD;  [facility]     ENDOSCOPIC RETROGRADE CHOLANGIOPANCREATOGRAPHY, EXCHANGE TUBE/STENT N/A 10/1/2021    Procedure: ENDOSCOPIC RETROGRADE CHOLANGIOPANCREATOGRAPHY with balloon sweep of bile ducts, bile duct stent exchanged;  Surgeon: Gregory Gabriel MD;  Location[facility]     ESOPHAGOSCOPY, GASTROSCOPY, DUODENOSCOPY (EGD), COMBINED N/A 2021    Procedure: ESOPHAGOGASTRODUODENOSCOPY (EGD);  Surgeon: Leventhal, Thomas Michael, MD;  Location: [facility] GI     ESOPHAGOSCOPY, GASTROSCOPY, DUODENOSCOPY (EGD), COMBINED N/A 10/1/2021    Procedure: ESOPHAGOGASTRODUODENOSCOPY (EGD) with varices banding;  Surgeon: Gregory Gabriel MD;  Location: [facility]     GYN SURGERY      bilat fallopian tubes and ovaries removed     PICC DOUBLE LUMEN PLACEMENT Right 2021    42cm (2cm external), Lateral brachial vein     RETURN LIVER TRANSPLANT N/A 2021    Procedure: CLOSURE, WOUND, ABDOMEN, SECONDARY, ABDOMINAL WASHOUT;  Surgeon: Ernesto Schmitt MD;  Location: [facility]     TRANSPLANT LIVER RECIPIENT  DONOR N/A 2021    Procedure: Opening of abdomen, Abdominal Exploration and aborted liver transplant.;  Surgeon: Ernesto Schmitt MD;  Location: [facility]     TRANSPLANT LIVER RECIPIENT  DONOR N/A 2021    Procedure: TRANSPLANT, LIVER, RECIPIENT,  DONOR;  Surgeon: Ernesto Schmitt MD;  Location: [facility] OR     Social History     Socioeconomic History     Marital status:      Spouse name: Not on file     Number of children: Not on file     Years of education:  Not on file     Highest education level: Not on file   Occupational History     Not on file   Tobacco Use     Smoking status: Never Smoker     Smokeless tobacco: Never Used   Vaping Use     Vaping Use: Never used   Substance and Sexual Activity     Alcohol use: No     Comment: Last drink was 2017     Drug use: No     Sexual activity: Not Currently     Partners: Male   Other Topics Concern     Parent/sibling w/ CABG, MI or angioplasty before 65F 55M? Not Asked   Social History Narrative     Not on file     Social Determinants of Health     Financial Resource Strain: Not on file   Food Insecurity: Not on file   Transportation Needs: Not on file   Physical Activity: Not on file   Stress: Not on file   Social Connections: Not on file   Intimate Partner Violence: Not on file   Housing Stability: Not on file     Prescription Medications as of 1/24/2022       Rx Number Disp Refills Start End Last Dispensed Date Next Fill Date Owning Pharmacy    aspirin (ASA) 81 MG EC tablet    1/7/2022        Sig: Take 1 tablet (81 mg) by mouth daily    Class: Historical    Route: Oral    multivitamin (CENTRUM SILVER) tablet            Sig: Take 1 tablet by mouth daily    Class: Historical    Route: Oral    mycophenolate (GENERIC EQUIVALENT) 250 MG capsule  540 capsule 3 11/30/2021    [facility] [address] [place] [address] [address] [place] [address]    Sig: Take 3 capsules (750 mg) by mouth 2 times daily    Class: E-Prescribe    Notes to Pharmacy: TXP DT 11/18/2021 (Liver) TXP Dischg DT 11/27/2021 DX Liver replaced by transplant Z94.4 [facility] ([place], [place])    Route: Oral    pantoprazole (PROTONIX) 40 MG EC tablet  30 tablet 3 1/17/2022    [facility] [address] [place] [address] [address] [place] [address]    Sig: Take 1 tablet (40 mg) by mouth daily    Class: E-Prescribe    Route: Oral    sulfamethoxazole-trimethoprim  (BACTRIM) 400-80 MG tablet  90 tablet 3 11/30/2021    [facility] STORE [id number] [place] [address] [address] [place] [address]    Sig: Take 1 tablet by mouth daily    Class: E-Prescribe    Route: Oral    tacrolimus (GENERIC EQUIVALENT) 1 MG capsule  90 capsule 3 1/4/2022    [facility] STORE [address] [place] [address] [address] [place] [address]    Sig: Take 1 capsule (1 mg) by mouth every evening Total dose 5mg am, 6mg pm    Class: E-Prescribe    Notes to Pharmacy: Dose change    Route: Oral    tacrolimus (GENERIC EQUIVALENT) 5 MG capsule  180 capsule 3 1/4/2022    [facility] STORE [address] [place] [address] [address] [place] [address]    Sig: Take 1 capsule (5 mg) by mouth every 12 hours Total dose 5mg am, 6mg pm    Class: E-Prescribe    Notes to Pharmacy: Dose change    Route: Oral    ursodiol (ACTIGALL) 300 MG capsule  180 capsule 3 11/30/2021    [facility] STORE [id number] [place] [address] [address] [place] [address]    Sig: Take 1 capsule (300 mg) by mouth 2 times daily    Class: E-Prescribe    Route: Oral    valGANciclovir (VALCYTE) 450 MG tablet  90 tablet 3 1/17/2022 5/18/2022   [facility] STORE [address] [place] [address] [address] [place] [address]    Sig: Take 1 tablet (450 mg) by mouth daily    Class: E-Prescribe    Route: Oral        Diagnostic x-ray materials and Contrast dye   REVIEW OF SYSTEMS (check box if normal)  [x]               GENERAL  [x]                 PULMONARY [x]                GENITOURINARY  [x]                CNS                 [x]                 CARDIAC  [x]                 ENDOCRINE  [x]                EARS,NOSE,THROAT [x]                 GASTROINTESTINAL [x]                 NEUROLOGIC    [x]                MUSCLOSKELTAL  [x]                  HEMATOLOGY      PHYSICAL EXAM (check box if normal)Wt 62.1 kg (136 lb 14.4 oz)   LMP  (LMP Unknown)   Breastfeeding No    BMI 22.78 kg/m      @txexam@    [x]            GENERAL:    [x]       EYES:  ICTERIC   []        YES  []                    NO  [x]           EXTREMITIES: Clubbing []       Y     [x]           N    [x]           EARS, NOSE, THROAT: Membranes Moist    YES   [x]                   NO []                  [x]           LUNGS:  CLEAR    YES       [x]                  NO    []                                [x]           SKIN: Jaundice           YES       []                  NO    [x]                   Rash: YES       []                  NO    [x]                                     [x]             HEART: Regular Rate          YES       [x]                  NO    []                   Incision Clean:  YES       [x]                  NO    []                                [x]                    ABDOMEN: Organomegaly YES       []                  NO    [x]                       [x]                    NEUROLOGICAL:  Nonfocal  YES       [x]                  NO    []                       [x]                    Hernia YES       []                  NO    [x]                   PSYCHIATRIC:  Appropriate  YES       [x]                  NO    []                       OTHER:                                                                                                   PAIN SCALE:: 3    Again, thank you for allowing me to participate in the care of your patient.      Sincerely,    Ernesto Schmitt MD

## 2022-01-24 NOTE — TELEPHONE ENCOUNTER
WILL SCHEDULE WITH RIDGES, SCREENING QUESTIONS COMPLETE  Screening Questions  Blue=prep questions Red=location Green=sedation   1. Are you active on mychart? Y    2. What insurance is in the chart? [employer]    3.  Ordering/Referring Provider: LEVENTHAL, THOMAS    4. BMI 22.6, If greater than 40 review exclusion criteria also will need EXTENDED PREP    5.  Respiratory Screening (If yes to any of the following HOSPITAL setting only):     Do you use daily home oxygen? N  Do you have mod to severe Obstructive Sleep Apnea? N (can be seen at [facility] or hospital setting)    Do you have Pulmonary Hypertension? N   Do you have UNCONTROLLED asthma? N    6. Have you had a heart or lung transplant? N  (If yes, please review exclusion criteria)    7. Are you currently on dialysis?N  (If yes, schedule in HOSPITAL setting only)(If yes, please send Gol[facility] prep)    8. Do you have chronic kidney disease? N (If yes, please send Gol[facility] prep)    9. Have you had a stroke or Transient ischemic attack (TIA) within 6 months? N (If yes, do not schedule at [facility])    10. In the past 6 months, have you had any heart related issues including cardiomyopathy or heart attack? N (If yes, please review exclusion criteria)           If yes, did it require cardiac stenting or other implantable device?  (If yes, please review exclusion criteria)      11. Do you have any implantable devices in your body (pacemaker, defib, LVAD)? N (If yes, schedule at [facility])    12. Do you take nitroglycerin? If yes, how often? N (if yes, schedule at HOSPITAL setting)    13. Are you currently taking any blood thinners?N (If yes- inform patient to follow up with PCP or provider for follow up instructions)     14. Are you a diabetic? N (If yes, please send Gol[facility]ely prep)    15. (Females) Are you currently pregnant?   If yes, how many weeks?      16. Are you taking any prescription pain medications on a routine schedule? N If yes, MAC sedation and patient will need EXTENDED  PREP.    17. Do you have any chemical dependencies such as alcohol, street drugs, or methadone? N If yes, MAC sedation     18. Do you have any history of post-traumatic stress syndrome, severe anxiety or history of psychosis? N  If yes, MAC sedation.     19. Do you transfer independently? Y    20.  Do you have any issues with constipation? N   If yes, pt will need EXTENDED PREP     21. Preferred Pharmacy for Pre Prescription "[email or web address] #[id number] - [place], [place] - [address] [place] [address] [address] [facility]    Scheduling Details    Which Colonoscopy Prep was Sent?:   Type of Procedure Scheduled:   Surgeon:   Date of Procedure:   Location:   Caller (Please ask for phone number if not scheduled by patient):       Sedation Type:   Conscious Sedation- Needs  for 6 hours after the procedure  MAC/General-Needs  for 24 hours after procedure    Pre-op Required at [facility], [place], [place] and OR for MAC sedation:   (if yes advise patient they will need a pre-op prior to procedure)      Informed patient they will need an adult    Cannot take any type of public or medical transportation alone    Pre-Procedure Covid test to be completed at [facility] or Externally:     Confirmed Nurse will call to complete assessment     Additional comments:  (DE ADRIANO'S PATIENTS NEED EXTENDED PREP)

## 2022-01-26 ENCOUNTER — TELEPHONE (OUTPATIENT)
Dept: TRANSPLANT | Facility: CLINIC | Age: 57
End: 2022-01-26
Payer: COMMERCIAL

## 2022-01-26 DIAGNOSIS — Z94.4 LIVER TRANSPLANTED (H): Primary | ICD-10-CM

## 2022-01-26 NOTE — TELEPHONE ENCOUNTER
Pt is scheduled for and EGD with Dr. Leventhal on 02/09 for stent removal as recommended by Dr. Leventhal.

## 2022-01-26 NOTE — TELEPHONE ENCOUNTER
Spoke with the patient who did not want to confirm hepatology appointments . Patient said that Dr Schmitt advised her to see him in a month and did not need to see Dr. leventhal

## 2022-01-26 NOTE — TELEPHONE ENCOUNTER
Stent present on abdominal x-ray 1/24. Pt is scheduled for EGD for stent removal on 2/9 with Dr Leventhal.

## 2022-01-31 ENCOUNTER — LAB (OUTPATIENT)
Dept: LAB | Facility: CLINIC | Age: 57
End: 2022-01-31
Payer: COMMERCIAL

## 2022-01-31 DIAGNOSIS — Z94.4 LIVER REPLACED BY TRANSPLANT (H): ICD-10-CM

## 2022-01-31 LAB
ALBUMIN SERPL-MCNC: 3.8 G/DL (ref 3.4–5)
ALP SERPL-CCNC: 78 U/L (ref 40–150)
ALT SERPL W P-5'-P-CCNC: 19 U/L (ref 0–50)
ANION GAP SERPL CALCULATED.3IONS-SCNC: 5 MMOL/L (ref 3–14)
AST SERPL W P-5'-P-CCNC: 14 U/L (ref 0–45)
BILIRUB DIRECT SERPL-MCNC: 0.2 MG/DL (ref 0–0.2)
BILIRUB SERPL-MCNC: 0.4 MG/DL (ref 0.2–1.3)
BUN SERPL-MCNC: 24 MG/DL (ref 7–30)
CALCIUM SERPL-MCNC: 9.4 MG/DL (ref 8.5–10.1)
CHLORIDE BLD-SCNC: 111 MMOL/L (ref 94–109)
CO2 SERPL-SCNC: 23 MMOL/L (ref 20–32)
CREAT SERPL-MCNC: 0.93 MG/DL (ref 0.52–1.04)
ERYTHROCYTE [DISTWIDTH] IN BLOOD BY AUTOMATED COUNT: 14.3 % (ref 10–15)
GFR SERPL CREATININE-BSD FRML MDRD: 71 ML/MIN/1.73M2
GLUCOSE BLD-MCNC: 87 MG/DL (ref 70–99)
HCT VFR BLD AUTO: 33.3 % (ref 35–47)
HGB BLD-MCNC: 10.8 G/DL (ref 11.7–15.7)
MAGNESIUM SERPL-MCNC: 2 MG/DL (ref 1.6–2.3)
MCH RBC QN AUTO: 28 PG (ref 26.5–33)
MCHC RBC AUTO-ENTMCNC: 32.4 G/DL (ref 31.5–36.5)
MCV RBC AUTO: 86 FL (ref 78–100)
PHOSPHATE SERPL-MCNC: 4.9 MG/DL (ref 2.5–4.5)
PLATELET # BLD AUTO: 184 10E3/UL (ref 150–450)
POTASSIUM BLD-SCNC: 4.2 MMOL/L (ref 3.4–5.3)
PROT SERPL-MCNC: 7 G/DL (ref 6.8–8.8)
RBC # BLD AUTO: 3.86 10E6/UL (ref 3.8–5.2)
SODIUM SERPL-SCNC: 139 MMOL/L (ref 133–144)
TACROLIMUS BLD-MCNC: 8.1 UG/L (ref 5–15)
TME LAST DOSE: NORMAL H
TME LAST DOSE: NORMAL H
WBC # BLD AUTO: 4 10E3/UL (ref 4–11)

## 2022-01-31 PROCEDURE — 80197 ASSAY OF TACROLIMUS: CPT

## 2022-01-31 PROCEDURE — 80053 COMPREHEN METABOLIC PANEL: CPT

## 2022-01-31 PROCEDURE — 36415 COLL VENOUS BLD VENIPUNCTURE: CPT

## 2022-01-31 PROCEDURE — 85027 COMPLETE CBC AUTOMATED: CPT

## 2022-01-31 PROCEDURE — 84100 ASSAY OF PHOSPHORUS: CPT

## 2022-01-31 PROCEDURE — 83735 ASSAY OF MAGNESIUM: CPT

## 2022-01-31 PROCEDURE — 82248 BILIRUBIN DIRECT: CPT

## 2022-02-02 ENCOUNTER — TELEPHONE (OUTPATIENT)
Dept: GASTROENTEROLOGY | Facility: CLINIC | Age: 57
End: 2022-02-02
Payer: COMMERCIAL

## 2022-02-02 NOTE — TELEPHONE ENCOUNTER
Pre assessment questions completed for upcoming EGD procedure scheduled on 2.9.2022    COVID test scheduled 2.6.2022    Reviewed procedural arrival time 0915 and facility location [facility].    Designated  policy reviewed. Instructed to have someone stay 6 hours post procedure.     Anticoagulation/blood thinners? no    Electronic implanted devices? no    Reviewed EGD prep instructions with patient.     Patient verbalized understanding and had no questions or concerns at this time.    Randi Fisher RN

## 2022-02-04 ENCOUNTER — VIRTUAL VISIT (OUTPATIENT)
Dept: PHARMACY | Facility: CLINIC | Age: 57
End: 2022-02-04
Payer: COMMERCIAL

## 2022-02-04 DIAGNOSIS — Z94.4 S/P LIVER TRANSPLANT (H): Primary | ICD-10-CM

## 2022-02-04 DIAGNOSIS — K21.9 GASTROESOPHAGEAL REFLUX DISEASE, UNSPECIFIED WHETHER ESOPHAGITIS PRESENT: ICD-10-CM

## 2022-02-04 PROCEDURE — 99207 PR NO CHARGE LOS: CPT | Performed by: PHARMACIST

## 2022-02-04 NOTE — PATIENT INSTRUCTIONS
Recommendations from today's MTM visit:                                                       1. Combine your MVI with your morning medications and your second dose of Ursodiol with 8pm meds.   2. COVID and flu shot when approved by transplant team.  3. At 6 months post transplant Pneumovax 23, 2nd dose of Shingrix, and Hepatitis B series. Options for HBV seen below:  -Engerix-B 20 mcg/mL: Administer 2 mL per dose at 0, 1, 2, and 6 months  -Recombivax HB 40 mcg/mL: Administer 1 mL per dose at 0, 1, and 6 months    Follow-up: as needed    It was great to speak with you today.  I value your experience and would be very thankful for your time with providing feedback on our clinic survey. You may receive a survey via email or text message in the next few days.     To schedule another MTM appointment, please call the clinic directly or you may call the MTM scheduling line at [phone] or toll-free at [phone].     My Clinical Pharmacist's contact information:                                                      Please feel free to contact me with any questions or concerns you have.      Stalin Gabriel, PharmD  MTM Pharmacist    Phone: [phone]

## 2022-02-04 NOTE — PROGRESS NOTES
Medication Therapy Management (MTM) Encounter    ASSESSMENT:                            Medication Adherence/Access: Simplifying patients med schedule, she can take everything at 8am and 8pm as she is no longer on magnesium    Liver Transplant:  Discussed doing Covid vaccines patient may get when approved by transplant team.  Will be due for Pneumovax Shingrix and hepatitis B series at 6 months post transplant as well.    GERD:Stable.     PLAN:                            1. Combine your MVI with your morning medications and your second dose of Ursodiol with 8pm meds.   2. COVID and flu shot when approved by transplant team.  3. At 6 months post transplant Pneumovax 23, 2nd dose of Shingrix, and Hepatitis B series.   -Engerix-B 20 mcg/mL: Administer 2 mL per dose at 0, 1, 2, and 6 months  -Recombivax HB 40 mcg/mL: Administer 1 mL per dose at 0, 1, and 6 months    Follow-up: as needed.     SUBJECTIVE/OBJECTIVE:                          Berta Nava is a 56 year old female called for a follow-up visit.  Today's visit is a follow-up MTM visit from 12/2     Reason for visit: 2-3 months post txp.   Questions on timing of medication: Takings    Allergies/ADRs: Reviewed in chart  Past Medical History: Reviewed in chart  Tobacco: She reports that she has never smoked. She has never used smokeless tobacco.  Alcohol: not currently using    Medication Adherence/Access: no issues reported 8am, 12pm, 6pm, 8pm    Liver Transplant:  Current immunosuppressants include Tacrolimus 5mg every morning and 6mg every evening and Mycophenolate Mofetil 750mg twice daily.  Pt reports Tremors have improved since last visit.    Vascular prophylaxis: Aspirin 81mg daily. Denies bleed sx.   Other meds: Pt is taking Ursodiol 300mg twice daily.   Transplant date: 11/18/21  Estimated Creatinine Clearance: 64.6 mL/min (based on SCr of 0.93 mg/dL).  CMV prophylaxis: CrCl 40 to 59 mL/minute: Valcyte 450 mg once daily Donor (+), Recipient (-), treat 6  months post tx   PCP prophylaxis: Bactrim S S daily for 6 months post txp  Supplements: MVI daily .  Tx Coordinator: Romario Blankenship MD: Dr. Schmitt, Using Med Card: Yes  Recent Infections: Intraabdominal abscess now healed per patient.   Immunizations: annual flu shot unknown; [id number]:  unknown; Prevnar 13: 2021; TDaP:  2014; Shingrix: 2021x1, HBV: no immunity, COVID: Moderna 2x2021    GERD: Current medications include: Protonix (pantoprazole) 40mg once daily. Pt reports no current symptoms.  Patient feels that current regimen is effective.     Today's Vitals: LMP  (LMP Unknown)   ----------------    I spent 18 minutes with this patient today. A copy of the visit note was provided to the patient's provider(s).    The patient was sent via Timely a summary of these recommendations.     Stalin Gabriel, PharmD  [facility] Pharmacist    Phone: [phone]     Telemedicine Visit Details  Type of service:  Telephone visit  Start Time: 11:18 AM  End Time: 11:36 AM  Originating Location (patient location): Home  Distant Location (provider location):  [facility]     Medication Therapy Recommendations  S/P liver transplant (H)           Rationale: Preventive therapy - Needs additional medication therapy - Indication   Recommendation: Order Vaccine - Shingrix 50 MCG/0.5ML Susr   Status: Patient Agreed - Adherence/Education

## 2022-02-06 ENCOUNTER — LAB (OUTPATIENT)
Dept: URGENT CARE | Facility: URGENT CARE | Age: 57
End: 2022-02-06
Attending: INTERNAL MEDICINE
Payer: COMMERCIAL

## 2022-02-06 DIAGNOSIS — Z11.59 ENCOUNTER FOR SCREENING FOR OTHER VIRAL DISEASES: ICD-10-CM

## 2022-02-06 PROCEDURE — U0005 INFEC AGEN DETEC AMPLI PROBE: HCPCS

## 2022-02-06 PROCEDURE — U0003 INFECTIOUS AGENT DETECTION BY NUCLEIC ACID (DNA OR RNA); SEVERE ACUTE RESPIRATORY SYNDROME CORONAVIRUS 2 (SARS-COV-2) (CORONAVIRUS DISEASE [COVID-19]), AMPLIFIED PROBE TECHNIQUE, MAKING USE OF HIGH THROUGHPUT TECHNOLOGIES AS DESCRIBED BY CMS-2020-01-R: HCPCS

## 2022-02-07 ENCOUNTER — LAB (OUTPATIENT)
Dept: LAB | Facility: CLINIC | Age: 57
End: 2022-02-07
Payer: COMMERCIAL

## 2022-02-07 DIAGNOSIS — Z94.4 LIVER REPLACED BY TRANSPLANT (H): ICD-10-CM

## 2022-02-07 LAB
ALBUMIN SERPL-MCNC: 3.8 G/DL (ref 3.4–5)
ALP SERPL-CCNC: 85 U/L (ref 40–150)
ALT SERPL W P-5'-P-CCNC: 24 U/L (ref 0–50)
ANION GAP SERPL CALCULATED.3IONS-SCNC: 3 MMOL/L (ref 3–14)
AST SERPL W P-5'-P-CCNC: 17 U/L (ref 0–45)
BILIRUB DIRECT SERPL-MCNC: 0.1 MG/DL (ref 0–0.2)
BILIRUB SERPL-MCNC: 0.4 MG/DL (ref 0.2–1.3)
BUN SERPL-MCNC: 23 MG/DL (ref 7–30)
CALCIUM SERPL-MCNC: 9.1 MG/DL (ref 8.5–10.1)
CHLORIDE BLD-SCNC: 110 MMOL/L (ref 94–109)
CO2 SERPL-SCNC: 24 MMOL/L (ref 20–32)
CREAT SERPL-MCNC: 0.88 MG/DL (ref 0.52–1.04)
ERYTHROCYTE [DISTWIDTH] IN BLOOD BY AUTOMATED COUNT: 14.2 % (ref 10–15)
GFR SERPL CREATININE-BSD FRML MDRD: 76 ML/MIN/1.73M2
GLUCOSE BLD-MCNC: 85 MG/DL (ref 70–99)
HCT VFR BLD AUTO: 36.2 % (ref 35–47)
HGB BLD-MCNC: 11.4 G/DL (ref 11.7–15.7)
MAGNESIUM SERPL-MCNC: 1.5 MG/DL (ref 1.8–2.6)
MCH RBC QN AUTO: 27 PG (ref 26.5–33)
MCHC RBC AUTO-ENTMCNC: 31.5 G/DL (ref 31.5–36.5)
MCV RBC AUTO: 86 FL (ref 78–100)
PHOSPHATE SERPL-MCNC: 5 MG/DL (ref 2.5–4.5)
PLATELET # BLD AUTO: 201 10E3/UL (ref 150–450)
POTASSIUM BLD-SCNC: 4.2 MMOL/L (ref 3.4–5.3)
PROT SERPL-MCNC: 7.1 G/DL (ref 6.8–8.8)
RBC # BLD AUTO: 4.22 10E6/UL (ref 3.8–5.2)
SARS-COV-2 RNA RESP QL NAA+PROBE: NEGATIVE
SODIUM SERPL-SCNC: 137 MMOL/L (ref 133–144)
TACROLIMUS BLD-MCNC: 9.4 UG/L (ref 5–15)
TME LAST DOSE: NORMAL H
TME LAST DOSE: NORMAL H
WBC # BLD AUTO: 3.6 10E3/UL (ref 4–11)

## 2022-02-07 PROCEDURE — 80053 COMPREHEN METABOLIC PANEL: CPT

## 2022-02-07 PROCEDURE — 84100 ASSAY OF PHOSPHORUS: CPT

## 2022-02-07 PROCEDURE — 85027 COMPLETE CBC AUTOMATED: CPT

## 2022-02-07 PROCEDURE — 82248 BILIRUBIN DIRECT: CPT

## 2022-02-07 PROCEDURE — 80197 ASSAY OF TACROLIMUS: CPT

## 2022-02-07 PROCEDURE — 83735 ASSAY OF MAGNESIUM: CPT

## 2022-02-07 PROCEDURE — 36415 COLL VENOUS BLD VENIPUNCTURE: CPT

## 2022-02-08 ENCOUNTER — ANESTHESIA EVENT (OUTPATIENT)
Dept: SURGERY | Facility: AMBULATORY SURGERY CENTER | Age: 57
End: 2022-02-08
Payer: COMMERCIAL

## 2022-02-08 DIAGNOSIS — Z94.4 STATUS POST LIVER TRANSPLANTATION (H): ICD-10-CM

## 2022-02-08 RX ORDER — TACROLIMUS 5 MG/1
5 CAPSULE ORAL EVERY 12 HOURS
Qty: 180 CAPSULE | Refills: 3 | Status: SHIPPED | OUTPATIENT
Start: 2022-02-08 | End: 2022-02-21

## 2022-02-08 RX ORDER — TACROLIMUS 1 MG/1
CAPSULE ORAL
Qty: 90 CAPSULE | Refills: 3 | COMMUNITY
Start: 2022-02-08 | End: 2022-02-21

## 2022-02-08 NOTE — TELEPHONE ENCOUNTER
ISSUE:   Tacrolimus IR level 9.4 on 2/7, goal 6-8, dose 5 mg in the morning and 6 mg in the evening.    PLAN:   Please call patient and confirm this was an accurate 12-hour trough. Verify Tacrolimus IR dose.. Confirm no new medications or illness. Confirm no missed doses. If accurate trough and accurate dose, decrease Tacrolimus IR dose to 5 mg BID and repeat labs in 1 week.  Vanessa Durant RN    OUTCOME:   Spoke with patient, they confirm accurate trough level and current dose 5 mg am, 6 mg pm. Patient confirmed dose change to 5 mg BID and to repeat labs in 1 weeks. Orders sent to preferred pharmacy for dose change and lab for repeat labs. Patient voiced understanding of plan.     Anne Marie Miranda LPN

## 2022-02-08 NOTE — ANESTHESIA PREPROCEDURE EVALUATION
Anesthesia Pre-Procedure Evaluation    Patient: Berta Nava   MRN: [id number] : 1965        Preoperative Diagnosis: Liver transplanted (H) [Z94.4]    Procedure : Procedure(s):  ESOPHAGOGASTRODUODENOSCOPY (EGD)          Past Medical History:   Diagnosis Date     Ascites      Biliary cirrhosis (H)      Cholangitis, sclerosing      Cirrhosis of liver with ascites (H) 3/3/2021     Hearing loss of left ear     wears a hearing aide     History of low potassium      Hyperlipidemia      Hypertension      SBP (spontaneous bacterial peritonitis) (H) 2021     Sjogren's syndrome (H)      Ulcerative colitis (H)      Ulcerative pancolitis (H)       Past Surgical History:   Procedure Laterality Date     BENCH LIVER N/A 2021    Procedure: Bench liver;  Surgeon: Ernesto Schmitt MD;  Location:  OR     CV CORONARY ANGIOGRAM N/A 2021    Procedure: Coronary Angiogram with possible intervention;  Surgeon: David Wilhelm MD;  Location:  [facility]     ENDOSCOPIC RETROGRADE CHOLANGIOPANCREATOGRAM N/A 2021    Procedure: ENDOSCOPIC RETROGRADE CHOLANGIOPANCREATOGRAPHY with ballon sweep of bile ducts for stones, balloon dilation of bile ducts and bile duct stent placement;  Surgeon: Gregory Gabriel MD;  Location:  OR     ENDOSCOPIC RETROGRADE CHOLANGIOPANCREATOGRAM N/A 2021    Procedure: ENDOSCOPIC RETROGRADE CHOLANGIOPANCREATOGRAPHY STONE REMOVAL, DILATION AND STENT PLACEMENT;  Surgeon: Gregory Gabriel MD;  Location:  OR     ENDOSCOPIC RETROGRADE CHOLANGIOPANCREATOGRAPHY, EXCHANGE TUBE/STENT N/A 2021    Procedure: ENDOSCOPIC RETROGRADE CHOLANGIOPANCREATOGRAPHY WITH STENT EXCHANGE, STONE EXTRACTION, AND DILATION;  Surgeon: Gregory Gabriel MD;  Location:  OR     ENDOSCOPIC RETROGRADE CHOLANGIOPANCREATOGRAPHY, EXCHANGE TUBE/STENT N/A 5/10/2021    Procedure: ENDOSCOPIC RETROGRADE CHOLANGIOPANCREATOGRAPHY with biliary dilation, stone removal, stent exchange;  Surgeon:  Gregory Gabriel MD;  [facility]     ENDOSCOPIC RETROGRADE CHOLANGIOPANCREATOGRAPHY, EXCHANGE TUBE/STENT N/A 6/10/2021    Procedure: ENDOSCOPIC RETROGRADE CHOLANGIOPANCREATOGRAPHY, WITH biliary stent exchange, stone removal;  Surgeon: Woodrow Lott MD;  [facility]     ENDOSCOPIC RETROGRADE CHOLANGIOPANCREATOGRAPHY, EXCHANGE TUBE/STENT N/A 2021    Procedure: ENDOSCOPIC RETROGRADE CHOLANGIOPANCREATOGRAPHY with biliary dilation, stone removal, stent exchange;  Surgeon: Gregory Gabriel MD;  [facility]     ENDOSCOPIC RETROGRADE CHOLANGIOPANCREATOGRAPHY, EXCHANGE TUBE/STENT N/A 10/1/2021    Procedure: ENDOSCOPIC RETROGRADE CHOLANGIOPANCREATOGRAPHY with balloon sweep of bile ducts, bile duct stent exchanged;  Surgeon: Gregory Gabriel MD;  [facility]     ESOPHAGOSCOPY, GASTROSCOPY, DUODENOSCOPY (EGD), COMBINED N/A 2021    Procedure: ESOPHAGOGASTRODUODENOSCOPY (EGD);  Surgeon: Leventhal, Thomas Michael, MD;  Location: [facility]     ESOPHAGOSCOPY, GASTROSCOPY, DUODENOSCOPY (EGD), COMBINED N/A 10/1/2021    Procedure: ESOPHAGOGASTRODUODENOSCOPY (EGD) with varices banding;  Surgeon: Gregory Gabriel MD;  [facility]     GYN SURGERY      bilat fallopian tubes and ovaries removed     PICC DOUBLE LUMEN PLACEMENT Right 2021    42cm (2cm external), Lateral brachial vein     RETURN LIVER TRANSPLANT N/A 2021    Procedure: CLOSURE, WOUND, ABDOMEN, SECONDARY, ABDOMINAL WASHOUT;  Surgeon: Ernesto Schmitt MD;  [facility]     TRANSPLANT LIVER RECIPIENT  DONOR N/A 2021    Procedure: Opening of abdomen, Abdominal Exploration and aborted liver transplant.;  Surgeon: Ernesto Schmitt MD;  [facility]     TRANSPLANT LIVER RECIPIENT  DONOR N/A 2021    Procedure: TRANSPLANT, LIVER, RECIPIENT,  DONOR;  Surgeon: Ernesto Schmitt MD;  [facility]      Allergies   Allergen Reactions     Diagnostic X-Ray Materials Hives     PATIENT HAD HIVE  REACTION AFTER ADMINISTERING CT CONTRAST DYE.       Contrast Dye       Social History     Tobacco Use     Smoking status: Never Smoker     Smokeless tobacco: Never Used   Substance Use Topics     Alcohol use: No     Comment: Last drink was 2017      Wt Readings from Last 1 Encounters:   01/24/22 62.1 kg (136 lb 14.4 oz)           Physical Exam    Airway        Mallampati: II   TM distance: > 3 FB   Neck ROM: full   Mouth opening: > 3 cm    Respiratory Devices and Support         Dental  no notable dental history         Cardiovascular             Pulmonary   pulmonary exam normal                OUTSIDE LABS:  CBC:   Lab Results   Component Value Date    WBC 3.6 (L) 02/07/2022    WBC 4.0 01/31/2022    HGB 11.4 (L) 02/07/2022    HGB 10.8 (L) 01/31/2022    HCT 36.2 02/07/2022    HCT 33.3 (L) 01/31/2022     02/07/2022     01/31/2022     BMP:   Lab Results   Component Value Date     02/07/2022     01/31/2022    POTASSIUM 4.2 02/07/2022    POTASSIUM 4.2 01/31/2022    CHLORIDE 110 (H) 02/07/2022    CHLORIDE 111 (H) 01/31/2022    CO2 24 02/07/2022    CO2 23 01/31/2022    BUN 23 02/07/2022    BUN 24 01/31/2022    CR 0.88 02/07/2022    CR 0.93 01/31/2022    GLC 85 02/07/2022    GLC 87 01/31/2022     COAGS:   Lab Results   Component Value Date    PTT 36 12/03/2021    INR 1.12 12/13/2021    FIBR 465 11/22/2021     POC:   Lab Results   Component Value Date    BGM 95 06/25/2021     HEPATIC:   Lab Results   Component Value Date    ALBUMIN 3.8 02/07/2022    PROTTOTAL 7.1 02/07/2022    ALT 24 02/07/2022    AST 17 02/07/2022    ALKPHOS 85 02/07/2022    BILITOTAL 0.4 02/07/2022    MIHIR 26 11/19/2021     OTHER:   Lab Results   Component Value Date    PH 7.48 (H) 11/20/2021    LACT 0.6 (L) 12/03/2021    A1C 5.4 11/19/2021    MARIELENA 9.1 02/07/2022    PHOS 5.0 (H) 02/07/2022    MAG 1.5 (L) 02/07/2022    LIPASE 155 12/03/2021    AMYLASE 36 11/19/2021    CRP 54.0 (H) 11/17/2021       Anesthesia Plan    ASA Status:  3    NPO Status:  NPO Appropriate    Anesthesia Type: MAC.     - Reason for MAC: straight local not clinically adequate   Induction: Intravenous, Propofol.   Maintenance: TIVA.        Consents    Anesthesia Plan(s) and associated risks, benefits, and realistic alternatives discussed. Questions answered and patient/representative(s) expressed understanding.    - Discussed:     - Discussed with:  Patient      - Extended Intubation/Ventilatory Support Discussed: No.      - Patient is DNR/DNI Status: No    Use of blood products discussed: No .     Postoperative Care    Pain management: Multi-modal analgesia.   PONV prophylaxis: Ondansetron (or other 5HT-3), Background Propofol Infusion     Comments:                Ruben Hagen MD

## 2022-02-09 ENCOUNTER — HOSPITAL ENCOUNTER (OUTPATIENT)
Facility: AMBULATORY SURGERY CENTER | Age: 57
End: 2022-02-09
Attending: INTERNAL MEDICINE
Payer: COMMERCIAL

## 2022-02-09 ENCOUNTER — ANESTHESIA (OUTPATIENT)
Dept: SURGERY | Facility: AMBULATORY SURGERY CENTER | Age: 57
End: 2022-02-09
Payer: COMMERCIAL

## 2022-02-09 ENCOUNTER — DOCUMENTATION ONLY (OUTPATIENT)
Dept: TRANSPLANT | Facility: CLINIC | Age: 57
End: 2022-02-09

## 2022-02-09 VITALS
WEIGHT: 134 LBS | OXYGEN SATURATION: 100 % | HEART RATE: 85 BPM | HEIGHT: 66 IN | TEMPERATURE: 97.7 F | BODY MASS INDEX: 21.53 KG/M2 | RESPIRATION RATE: 14 BRPM | SYSTOLIC BLOOD PRESSURE: 130 MMHG | DIASTOLIC BLOOD PRESSURE: 72 MMHG

## 2022-02-09 DIAGNOSIS — Z94.4 S/P LIVER TRANSPLANT (H): Primary | ICD-10-CM

## 2022-02-09 DIAGNOSIS — Z76.82 LIVER TRANSPLANT PLANNED: ICD-10-CM

## 2022-02-09 LAB — UPPER GI ENDOSCOPY: NORMAL

## 2022-02-09 PROCEDURE — 43247 EGD REMOVE FOREIGN BODY: CPT

## 2022-02-09 RX ORDER — LIDOCAINE 40 MG/G
CREAM TOPICAL
Status: DISCONTINUED | OUTPATIENT
Start: 2022-02-09 | End: 2022-02-09 | Stop reason: HOSPADM

## 2022-02-09 RX ORDER — FLUMAZENIL 0.1 MG/ML
0.2 INJECTION, SOLUTION INTRAVENOUS
Status: ACTIVE | OUTPATIENT
Start: 2022-02-09 | End: 2022-02-09

## 2022-02-09 RX ORDER — ONDANSETRON 2 MG/ML
4 INJECTION INTRAMUSCULAR; INTRAVENOUS EVERY 6 HOURS PRN
Status: DISCONTINUED | OUTPATIENT
Start: 2022-02-09 | End: 2022-02-10 | Stop reason: HOSPADM

## 2022-02-09 RX ORDER — PROPOFOL 10 MG/ML
INJECTION, EMULSION INTRAVENOUS PRN
Status: DISCONTINUED | OUTPATIENT
Start: 2022-02-09 | End: 2022-02-09

## 2022-02-09 RX ORDER — SODIUM CHLORIDE, SODIUM LACTATE, POTASSIUM CHLORIDE, CALCIUM CHLORIDE 600; 310; 30; 20 MG/100ML; MG/100ML; MG/100ML; MG/100ML
INJECTION, SOLUTION INTRAVENOUS CONTINUOUS
Status: DISCONTINUED | OUTPATIENT
Start: 2022-02-09 | End: 2022-02-09 | Stop reason: HOSPADM

## 2022-02-09 RX ORDER — NALOXONE HYDROCHLORIDE 0.4 MG/ML
0.2 INJECTION, SOLUTION INTRAMUSCULAR; INTRAVENOUS; SUBCUTANEOUS
Status: DISCONTINUED | OUTPATIENT
Start: 2022-02-09 | End: 2022-02-10 | Stop reason: HOSPADM

## 2022-02-09 RX ORDER — PROPOFOL 10 MG/ML
INJECTION, EMULSION INTRAVENOUS CONTINUOUS PRN
Status: DISCONTINUED | OUTPATIENT
Start: 2022-02-09 | End: 2022-02-09

## 2022-02-09 RX ORDER — SIMETHICONE
LIQUID (ML) MISCELLANEOUS PRN
Status: DISCONTINUED | OUTPATIENT
Start: 2022-02-09 | End: 2022-02-09 | Stop reason: HOSPADM

## 2022-02-09 RX ORDER — LIDOCAINE HYDROCHLORIDE 20 MG/ML
INJECTION, SOLUTION INFILTRATION; PERINEURAL PRN
Status: DISCONTINUED | OUTPATIENT
Start: 2022-02-09 | End: 2022-02-09

## 2022-02-09 RX ORDER — NALOXONE HYDROCHLORIDE 0.4 MG/ML
0.4 INJECTION, SOLUTION INTRAMUSCULAR; INTRAVENOUS; SUBCUTANEOUS
Status: DISCONTINUED | OUTPATIENT
Start: 2022-02-09 | End: 2022-02-10 | Stop reason: HOSPADM

## 2022-02-09 RX ORDER — ONDANSETRON 2 MG/ML
4 INJECTION INTRAMUSCULAR; INTRAVENOUS
Status: DISCONTINUED | OUTPATIENT
Start: 2022-02-09 | End: 2022-02-09 | Stop reason: HOSPADM

## 2022-02-09 RX ORDER — ONDANSETRON 4 MG/1
4 TABLET, ORALLY DISINTEGRATING ORAL EVERY 6 HOURS PRN
Status: DISCONTINUED | OUTPATIENT
Start: 2022-02-09 | End: 2022-02-10 | Stop reason: HOSPADM

## 2022-02-09 RX ORDER — PROCHLORPERAZINE MALEATE 10 MG
10 TABLET ORAL EVERY 6 HOURS PRN
Status: DISCONTINUED | OUTPATIENT
Start: 2022-02-09 | End: 2022-02-10 | Stop reason: HOSPADM

## 2022-02-09 RX ADMIN — PROPOFOL 50 MG: 10 INJECTION, EMULSION INTRAVENOUS at 10:50

## 2022-02-09 RX ADMIN — PROPOFOL 150 MCG/KG/MIN: 10 INJECTION, EMULSION INTRAVENOUS at 10:47

## 2022-02-09 RX ADMIN — LIDOCAINE HYDROCHLORIDE 70 MG: 20 INJECTION, SOLUTION INFILTRATION; PERINEURAL at 10:47

## 2022-02-09 RX ADMIN — SODIUM CHLORIDE, SODIUM LACTATE, POTASSIUM CHLORIDE, CALCIUM CHLORIDE: 600; 310; 30; 20 INJECTION, SOLUTION INTRAVENOUS at 10:47

## 2022-02-09 ASSESSMENT — MIFFLIN-ST. JEOR: SCORE: 1204.57

## 2022-02-09 NOTE — ANESTHESIA POSTPROCEDURE EVALUATION
Patient: Berta Nava    Procedure: Procedure(s):  ESOPHAGOGASTRODUODENOSCOPY, WITH BILIARY STENT REMOVAL       Diagnosis:Liver transplanted (H) [Z94.4]  Diagnosis Additional Information: No value filed.    Anesthesia Type:  MAC    Note:  Disposition: Outpatient   Postop Pain Control: Uneventful            Sign Out: Well controlled pain   PONV:    Neuro/Psych: Uneventful            Sign Out: Acceptable/Baseline neuro status   Airway/Respiratory: Uneventful            Sign Out: Acceptable/Baseline resp. status   CV/Hemodynamics: Uneventful            Sign Out: Acceptable CV status; No obvious hypovolemia; No obvious fluid overload   Other NRE:    DID A NON-ROUTINE EVENT OCCUR?            Last vitals:  Vitals Value Taken Time   /72 02/09/22 1125   Temp 36.5  C (97.7  F) 02/09/22 1125   Pulse     Resp 14 02/09/22 1125   SpO2 100 % 02/09/22 1125       Electronically Signed By: Ruben Hagen MD  February 9, 2022  3:13 PM

## 2022-02-09 NOTE — H&P
Berta Nava  [id number]  female  56 year old      Reason for procedure/surgery: EGD for stent removal    Patient Active Problem List   Diagnosis     Cholangitis     Hypokalemia     Chronic fatigue     Essential hypertension     Hyperlipidemia     Mixed hearing loss, bilateral     Neoplasm of uncertain behavior of skin     PSC (primary sclerosing cholangitis)     Sicca syndrome (H)     Ulcerative pancolitis with complication (H)     Unsatisfactory cervical Papanicolaou smear     Cirrhosis of liver with ascites (H)     Sepsis (H)     SBP (spontaneous bacterial peritonitis) (H)     Choledocholithiasis     Encounter for replacement of biliary stent     Secondary esophageal varices without bleeding (H)     Primary sclerosing cholangitis     Liver transplant planned     Liver transplant candidate     Acute kidney failure, unspecified (H)     Severe protein-calorie malnutrition (H)     Encephalopathy     Hyperammonemia (H)     Leukocytosis, unspecified type     Hepatic encephalopathy (H)     Chronic liver failure without hepatic coma (H)     Immunosuppressed status (H)     S/P liver transplant (H)     Anemia due to chronic kidney disease       Past Surgical History:    Past Surgical History:   Procedure Laterality Date     BENCH LIVER N/A 11/18/2021    Procedure: Bench liver;  Surgeon: Ernesto Schmitt MD;  [facility]     CV CORONARY ANGIOGRAM N/A 8/25/2021    Procedure: Coronary Angiogram with possible intervention;  Surgeon: David Wilhelm MD;  Location:  [facility]     ENDOSCOPIC RETROGRADE CHOLANGIOPANCREATOGRAM N/A 6/25/2021    Procedure: ENDOSCOPIC RETROGRADE CHOLANGIOPANCREATOGRAPHY with ballon sweep of bile ducts for stones, balloon dilation of bile ducts and bile duct stent placement;  Surgeon: Gregory Gabriel MD;  Location:  OR     ENDOSCOPIC RETROGRADE CHOLANGIOPANCREATOGRAM N/A 7/22/2021    Procedure: ENDOSCOPIC RETROGRADE CHOLANGIOPANCREATOGRAPHY STONE REMOVAL, DILATION AND  STENT PLACEMENT;  Surgeon: Gregory Gabriel MD;  Location:  OR     ENDOSCOPIC RETROGRADE CHOLANGIOPANCREATOGRAPHY, EXCHANGE TUBE/STENT N/A 05/05/2021    Procedure: ENDOSCOPIC RETROGRADE CHOLANGIOPANCREATOGRAPHY WITH STENT EXCHANGE, STONE EXTRACTION, AND DILATION;  Surgeon: Gregory Gabriel MD;  [facility]     ENDOSCOPIC RETROGRADE CHOLANGIOPANCREATOGRAPHY, EXCHANGE TUBE/STENT N/A 5/10/2021    Procedure: ENDOSCOPIC RETROGRADE CHOLANGIOPANCREATOGRAPHY with biliary dilation, stone removal, stent exchange;  Surgeon: Gregory Gabriel MD;  [facility]     ENDOSCOPIC RETROGRADE CHOLANGIOPANCREATOGRAPHY, EXCHANGE TUBE/STENT N/A 6/10/2021    Procedure: ENDOSCOPIC RETROGRADE CHOLANGIOPANCREATOGRAPHY, WITH biliary stent exchange, stone removal;  Surgeon: Woodrow Lott MD;  [facility]     ENDOSCOPIC RETROGRADE CHOLANGIOPANCREATOGRAPHY, EXCHANGE TUBE/STENT N/A 8/30/2021    Procedure: ENDOSCOPIC RETROGRADE CHOLANGIOPANCREATOGRAPHY with biliary dilation, stone removal, stent exchange;  Surgeon: Gregory Gabriel MD;  [facility]     ENDOSCOPIC RETROGRADE CHOLANGIOPANCREATOGRAPHY, EXCHANGE TUBE/STENT N/A 10/1/2021    Procedure: ENDOSCOPIC RETROGRADE CHOLANGIOPANCREATOGRAPHY with balloon sweep of bile ducts, bile duct stent exchanged;  Surgeon: Gregory Gabriel MD;  [facility]     ESOPHAGOSCOPY, GASTROSCOPY, DUODENOSCOPY (EGD), COMBINED N/A 9/4/2021    Procedure: ESOPHAGOGASTRODUODENOSCOPY (EGD);  Surgeon: Leventhal, Thomas Michael, MD;  Location:  GI     ESOPHAGOSCOPY, GASTROSCOPY, DUODENOSCOPY (EGD), COMBINED N/A 10/1/2021    Procedure: ESOPHAGOGASTRODUODENOSCOPY (EGD) with varices banding;  Surgeon: Gregory Gabriel MD;  [facility]     GYN SURGERY      bilat fallopian tubes and ovaries removed     PICC DOUBLE LUMEN PLACEMENT Right 05/08/2021    42cm (2cm external), Lateral brachial vein     RETURN LIVER TRANSPLANT N/A 11/19/2021    Procedure: CLOSURE, WOUND, ABDOMEN, SECONDARY, ABDOMINAL WASHOUT;   Surgeon: Ernesto Schmitt MD;  [facility]     TRANSPLANT LIVER RECIPIENT  DONOR N/A 2021    Procedure: Opening of abdomen, Abdominal Exploration and aborted liver transplant.;  Surgeon: Ernesto Schmitt MD;  [facility]     TRANSPLANT LIVER RECIPIENT  DONOR N/A 2021    Procedure: TRANSPLANT, LIVER, RECIPIENT,  DONOR;  Surgeon: Ernesto Schmitt MD;  [facility]       Past Medical History:   Past Medical History:   Diagnosis Date     Ascites      Biliary cirrhosis (H)      Cholangitis, sclerosing      Cirrhosis of liver with ascites (H) 3/3/2021     Hearing loss of left ear     wears a hearing aide     History of low potassium      Hyperlipidemia      Hypertension      SBP (spontaneous bacterial peritonitis) (H) 2021     Sjogren's syndrome (H)      Ulcerative colitis (H)      Ulcerative pancolitis (H)        Social History:   Social History     Tobacco Use     Smoking status: Never Smoker     Smokeless tobacco: Never Used   Substance Use Topics     Alcohol use: No     Comment: Last drink was        Family History:   Family History   Problem Relation Age of Onset     Cancer Mother         angiosarcoma     Coronary Artery Disease Early Onset Father         MI age 46     Heart Transplant Father      Liver Disease No family hx of      Ulcerative Colitis No family hx of      Crohn's Disease No family hx of        Allergies:   Allergies   Allergen Reactions     Diagnostic X-Ray Materials Hives     PATIENT HAD HIVE REACTION AFTER ADMINISTERING CT CONTRAST DYE.       Contrast Dye        Active Medications:   Current Outpatient Medications   Medication Sig Dispense Refill     aspirin (ASA) 81 MG EC tablet Take 1 tablet (81 mg) by mouth daily       multivitamin (CENTRUM SILVER) tablet Take 1 tablet by mouth daily       mycophenolate (GENERIC EQUIVALENT) 250 MG capsule Take 3 capsules (750 mg) by mouth 2 times daily 540 capsule 3     pantoprazole (PROTONIX)  "40 MG EC tablet Take 1 tablet (40 mg) by mouth daily 30 tablet 3     tacrolimus (GENERIC EQUIVALENT) 1 MG capsule For prn use 90 capsule 3     ursodiol (ACTIGALL) 300 MG capsule Take 1 capsule (300 mg) by mouth 2 times daily 180 capsule 3     valGANciclovir (VALCYTE) 450 MG tablet Take 1 tablet (450 mg) by mouth daily 90 tablet 3     sulfamethoxazole-trimethoprim (BACTRIM) 400-80 MG tablet Take 1 tablet by mouth daily 90 tablet 3     tacrolimus (GENERIC EQUIVALENT) 5 MG capsule Take 1 capsule (5 mg) by mouth every 12 hours 180 capsule 3       Systemic Review:   CONSTITUTIONAL: NEGATIVE for fever, chills, change in weight  ENT/MOUTH: NEGATIVE for ear, mouth and throat problems  RESP: NEGATIVE for significant cough or SOB  CV: NEGATIVE for chest pain, palpitations or peripheral edema    Physical Examination:   Vital Signs: BP (!) 157/89 (Cuff Size: Adult Regular)   Pulse 85   Temp 97.9  F (36.6  C) (Temporal)   Resp 18   Ht 1.676 m (5' 6\")   Wt 60.8 kg (134 lb)   LMP  (LMP Unknown)   SpO2 100%   BMI 21.63 kg/m    GENERAL: healthy, alert and no distress  RESP: lungs clear to auscultation - no rales, rhonchi or wheezes  CV: regular rate and rhythm, normal S1 S2, no S3 or S4, no murmur, click or rub, no peripheral edema and peripheral pulses strong  ABDOMEN: soft, nontender, no hepatosplenomegaly, no masses and bowel sounds normal  MS: no gross musculoskeletal defects noted, no edema    Plan: Appropriate to proceed as scheduled.      Thomas M. Leventhal, MD  2/9/2022    PCP:  Tila Nevarez    "

## 2022-02-09 NOTE — ANESTHESIA CARE TRANSFER NOTE
Patient: Berta Nava    Procedure: Procedure(s):  ESOPHAGOGASTRODUODENOSCOPY, WITH BILIARY STENT REMOVAL       Diagnosis: Liver transplanted (H) [Z94.4]  Diagnosis Additional Information: No value filed.    Anesthesia Type:   MAC     Note:    Oropharynx: spontaneously breathing  Level of Consciousness: awake  Oxygen Supplementation: room air    Independent Airway: airway patency satisfactory and stable  Dentition: dentition unchanged  Vital Signs Stable: post-procedure vital signs reviewed and stable  Report to RN Given: handoff report given  Patient transferred to: Phase II    Handoff Report: Identifed the Patient, Identified the Reponsible Provider, Reviewed the pertinent medical history, Discussed the surgical course, Reviewed Intra-OP anesthesia mangement and issues during anesthesia, Set expectations for post-procedure period and Allowed opportunity for questions and acknowledgement of understanding      Vitals:  Vitals Value Taken Time   BP 96/56 02/09/22 1110   Temp 36.7  C (98  F) 02/09/22 1110   Pulse     Resp 14 02/09/22 1110   SpO2 100 % 02/09/22 1110       Electronically Signed By: CHELSEY Pitt CRNA  February 9, 2022  11:29 AM

## 2022-02-10 ENCOUNTER — TELEPHONE (OUTPATIENT)
Dept: TRANSPLANT | Facility: CLINIC | Age: 57
End: 2022-02-10
Payer: COMMERCIAL

## 2022-02-10 NOTE — TELEPHONE ENCOUNTER
"Call to Berta to check in.  She is feeling \"great\".  She attended support group today.  She is no longer packing her wound.  She is eating well.  She is close to 3 mos post so hopefully when she can wean her cellcept next week if Dr. Schmitt gives us the go ahead.   Her stent was removed by EGD yesterday.  I asked her to make a follow-up appt w/ Dr. Leventhal in 1-2 mos.   "

## 2022-02-12 ENCOUNTER — HEALTH MAINTENANCE LETTER (OUTPATIENT)
Age: 57
End: 2022-02-12

## 2022-02-14 ENCOUNTER — LAB (OUTPATIENT)
Dept: LAB | Facility: CLINIC | Age: 57
End: 2022-02-14
Payer: COMMERCIAL

## 2022-02-14 DIAGNOSIS — Z94.4 LIVER REPLACED BY TRANSPLANT (H): ICD-10-CM

## 2022-02-14 LAB
ALBUMIN SERPL-MCNC: 3.9 G/DL (ref 3.4–5)
ALP SERPL-CCNC: 87 U/L (ref 40–150)
ALT SERPL W P-5'-P-CCNC: 31 U/L (ref 0–50)
ANION GAP SERPL CALCULATED.3IONS-SCNC: 3 MMOL/L (ref 3–14)
AST SERPL W P-5'-P-CCNC: 19 U/L (ref 0–45)
BILIRUB DIRECT SERPL-MCNC: 0.1 MG/DL (ref 0–0.2)
BILIRUB SERPL-MCNC: 0.4 MG/DL (ref 0.2–1.3)
BUN SERPL-MCNC: 30 MG/DL (ref 7–30)
CALCIUM SERPL-MCNC: 9.1 MG/DL (ref 8.5–10.1)
CHLORIDE BLD-SCNC: 112 MMOL/L (ref 94–109)
CO2 SERPL-SCNC: 23 MMOL/L (ref 20–32)
CREAT SERPL-MCNC: 0.93 MG/DL (ref 0.52–1.04)
ERYTHROCYTE [DISTWIDTH] IN BLOOD BY AUTOMATED COUNT: 14.6 % (ref 10–15)
GFR SERPL CREATININE-BSD FRML MDRD: 71 ML/MIN/1.73M2
GLUCOSE BLD-MCNC: 93 MG/DL (ref 70–99)
HCT VFR BLD AUTO: 33.7 % (ref 35–47)
HGB BLD-MCNC: 11.3 G/DL (ref 11.7–15.7)
MAGNESIUM SERPL-MCNC: 1.7 MG/DL (ref 1.6–2.3)
MCH RBC QN AUTO: 28 PG (ref 26.5–33)
MCHC RBC AUTO-ENTMCNC: 33.5 G/DL (ref 31.5–36.5)
MCV RBC AUTO: 83 FL (ref 78–100)
PHOSPHATE SERPL-MCNC: 5.2 MG/DL (ref 2.5–4.5)
PLATELET # BLD AUTO: 187 10E3/UL (ref 150–450)
POTASSIUM BLD-SCNC: 4.4 MMOL/L (ref 3.4–5.3)
PROT SERPL-MCNC: 7.5 G/DL (ref 6.8–8.8)
RBC # BLD AUTO: 4.04 10E6/UL (ref 3.8–5.2)
SODIUM SERPL-SCNC: 138 MMOL/L (ref 133–144)
TACROLIMUS BLD-MCNC: 7.4 UG/L (ref 5–15)
TME LAST DOSE: NORMAL H
TME LAST DOSE: NORMAL H
WBC # BLD AUTO: 3.9 10E3/UL (ref 4–11)

## 2022-02-14 PROCEDURE — 85027 COMPLETE CBC AUTOMATED: CPT

## 2022-02-14 PROCEDURE — 80197 ASSAY OF TACROLIMUS: CPT

## 2022-02-14 PROCEDURE — 83735 ASSAY OF MAGNESIUM: CPT

## 2022-02-14 PROCEDURE — 82248 BILIRUBIN DIRECT: CPT

## 2022-02-14 PROCEDURE — 80053 COMPREHEN METABOLIC PANEL: CPT

## 2022-02-14 PROCEDURE — 84100 ASSAY OF PHOSPHORUS: CPT

## 2022-02-14 PROCEDURE — 36415 COLL VENOUS BLD VENIPUNCTURE: CPT

## 2022-02-14 NOTE — PROGRESS NOTES
This is a recent snapshot of the patient's [facility] medical record.  For current drug dose and complete information and questions, call [phone] or In Basket pool, [facility] ([id number])  CSN Number:  [id number]

## 2022-02-17 ENCOUNTER — TELEPHONE (OUTPATIENT)
Dept: TRANSPLANT | Facility: CLINIC | Age: 57
End: 2022-02-17
Payer: COMMERCIAL

## 2022-02-17 ENCOUNTER — DOCUMENTATION ONLY (OUTPATIENT)
Dept: TRANSPLANT | Facility: CLINIC | Age: 57
End: 2022-02-17
Payer: COMMERCIAL

## 2022-02-17 DIAGNOSIS — Z94.4 STATUS POST LIVER TRANSPLANTATION (H): ICD-10-CM

## 2022-02-17 RX ORDER — MYCOPHENOLATE MOFETIL 250 MG/1
CAPSULE ORAL
Qty: 540 CAPSULE | Refills: 3 | COMMUNITY
Start: 2022-02-17 | End: 2022-03-25

## 2022-02-17 NOTE — TELEPHONE ENCOUNTER
Cellcept taper per protocol to start on 2/19/22 as follows:  On 2/19 decrease mycophenolate to 500 mg po bid, on 2/26 decrease mycophenolate to 250 mg po bid for a week, on 3/5/22 stop mycophenolate.  Continue weekly labs thru 2/25, then do every other week x 4, if good at that point can decrease to monthly.   Berta has an appt w/ Dr. Schmitt on Monday.     Instructed pt of the above orders and orders sent to pt's mychart.

## 2022-02-21 ENCOUNTER — LAB (OUTPATIENT)
Dept: LAB | Facility: CLINIC | Age: 57
End: 2022-02-21
Attending: TRANSPLANT SURGERY
Payer: COMMERCIAL

## 2022-02-21 ENCOUNTER — OFFICE VISIT (OUTPATIENT)
Dept: TRANSPLANT | Facility: CLINIC | Age: 57
End: 2022-02-21
Attending: TRANSPLANT SURGERY
Payer: COMMERCIAL

## 2022-02-21 VITALS
OXYGEN SATURATION: 100 % | SYSTOLIC BLOOD PRESSURE: 165 MMHG | BODY MASS INDEX: 21.63 KG/M2 | DIASTOLIC BLOOD PRESSURE: 91 MMHG | HEART RATE: 83 BPM | WEIGHT: 134 LBS

## 2022-02-21 DIAGNOSIS — Z94.4 LIVER TRANSPLANTED (H): Primary | ICD-10-CM

## 2022-02-21 DIAGNOSIS — Z94.4 STATUS POST LIVER TRANSPLANTATION (H): ICD-10-CM

## 2022-02-21 DIAGNOSIS — Z94.4 LIVER REPLACED BY TRANSPLANT (H): ICD-10-CM

## 2022-02-21 LAB
ALBUMIN SERPL-MCNC: 3.9 G/DL (ref 3.4–5)
ALP SERPL-CCNC: 76 U/L (ref 40–150)
ALT SERPL W P-5'-P-CCNC: 37 U/L (ref 0–50)
ANION GAP SERPL CALCULATED.3IONS-SCNC: 8 MMOL/L (ref 3–14)
AST SERPL W P-5'-P-CCNC: 26 U/L (ref 0–45)
BILIRUB DIRECT SERPL-MCNC: 0.1 MG/DL (ref 0–0.2)
BILIRUB SERPL-MCNC: 0.4 MG/DL (ref 0.2–1.3)
BUN SERPL-MCNC: 36 MG/DL (ref 7–30)
CALCIUM SERPL-MCNC: 9 MG/DL (ref 8.5–10.1)
CHLORIDE BLD-SCNC: 114 MMOL/L (ref 94–109)
CO2 SERPL-SCNC: 19 MMOL/L (ref 20–32)
CREAT SERPL-MCNC: 0.89 MG/DL (ref 0.52–1.04)
ERYTHROCYTE [DISTWIDTH] IN BLOOD BY AUTOMATED COUNT: 14.5 % (ref 10–15)
GFR SERPL CREATININE-BSD FRML MDRD: 75 ML/MIN/1.73M2
GLUCOSE BLD-MCNC: 87 MG/DL (ref 70–99)
HCT VFR BLD AUTO: 33.9 % (ref 35–47)
HGB BLD-MCNC: 10.9 G/DL (ref 11.7–15.7)
MAGNESIUM SERPL-MCNC: 1.7 MG/DL (ref 1.6–2.3)
MCH RBC QN AUTO: 26.7 PG (ref 26.5–33)
MCHC RBC AUTO-ENTMCNC: 32.2 G/DL (ref 31.5–36.5)
MCV RBC AUTO: 83 FL (ref 78–100)
PHOSPHATE SERPL-MCNC: 4.7 MG/DL (ref 2.5–4.5)
PLATELET # BLD AUTO: 144 10E3/UL (ref 150–450)
POTASSIUM BLD-SCNC: 4.1 MMOL/L (ref 3.4–5.3)
PROT SERPL-MCNC: 7.2 G/DL (ref 6.8–8.8)
RBC # BLD AUTO: 4.08 10E6/UL (ref 3.8–5.2)
SODIUM SERPL-SCNC: 141 MMOL/L (ref 133–144)
TACROLIMUS BLD-MCNC: 5.8 UG/L (ref 5–15)
TME LAST DOSE: NORMAL H
TME LAST DOSE: NORMAL H
WBC # BLD AUTO: 3.9 10E3/UL (ref 4–11)

## 2022-02-21 PROCEDURE — 36415 COLL VENOUS BLD VENIPUNCTURE: CPT | Performed by: PATHOLOGY

## 2022-02-21 PROCEDURE — 80197 ASSAY OF TACROLIMUS: CPT | Performed by: TRANSPLANT SURGERY

## 2022-02-21 PROCEDURE — 80053 COMPREHEN METABOLIC PANEL: CPT | Performed by: PATHOLOGY

## 2022-02-21 PROCEDURE — 83735 ASSAY OF MAGNESIUM: CPT | Performed by: PATHOLOGY

## 2022-02-21 PROCEDURE — 99213 OFFICE O/P EST LOW 20 MIN: CPT | Performed by: TRANSPLANT SURGERY

## 2022-02-21 PROCEDURE — 85027 COMPLETE CBC AUTOMATED: CPT | Performed by: PATHOLOGY

## 2022-02-21 PROCEDURE — 82248 BILIRUBIN DIRECT: CPT | Performed by: PATHOLOGY

## 2022-02-21 PROCEDURE — 84100 ASSAY OF PHOSPHORUS: CPT | Performed by: PATHOLOGY

## 2022-02-21 RX ORDER — TACROLIMUS 5 MG/1
5 CAPSULE ORAL EVERY 12 HOURS
Qty: 180 CAPSULE | Refills: 3 | Status: SHIPPED | OUTPATIENT
Start: 2022-02-21 | End: 2022-03-22

## 2022-02-21 RX ORDER — TACROLIMUS 1 MG/1
1 CAPSULE ORAL EVERY 12 HOURS
Qty: 90 CAPSULE | Refills: 3 | Status: SHIPPED | OUTPATIENT
Start: 2022-02-21 | End: 2022-03-22

## 2022-02-21 NOTE — TELEPHONE ENCOUNTER
ISSUE:   Tacrolimus IR level 5.8 on 2/21, goal 6-8, dose 5 mg BID.    PLAN:   Please call patient and confirm this was an accurate 12-hour trough. Verify Tacrolimus IR dose. Confirm no new medications or illness. Confirm no missed doses. If accurate trough and accurate dose, increase Tacrolimus IR dose to 6 mg BID and repeat labs in 2 weeks.  Vanessa Durant RN  OUTCOME:   Spoke with patient, they confirm accurate trough level and current dose 5 mg BID. Patient confirmed dose change to 6 mg BID and to repeat labs in 2 weeks. Orders sent to preferred pharmacy for dose change and lab for repeat labs. Patient voiced understanding of plan.     Anne Marie Miranda LPN

## 2022-02-21 NOTE — LETTER
2/21/2022         RE: Berta Nava  [address] [facility] [id number]        Dear Colleague,    Thank you for referring your patient, Berta Nava, to the [facility]PL[facility]. Please see a copy of my visit note below.      Transplant Surgery -OUTPATIENT IMMUNOSUPPRESSION PROGRESS NOTE    Date of Visit: 02/21/2022    Transplants:  11/18/2021 (Liver); Postoperative day:  95  ASSESMENT AND PLAN:  1.Graft Function:Liver allograft: no rejection or technical problems.    2.Immunosuppression Management: keep tacrolimus levels between 6-8 ng/dL; start prednisone 2.5 mg po daily  3.Hypertension: ok  4.Renal Function: better  5.Lab frequency: weekly  6.Other:  Wound healing well     Date: February 21, 2022    Transplant:  [x]                             Liver [x]                              Kidney []                             Pancreas []                              Other:             Chief Complaint:  Doing well    History of Present Illness:  Patient Active Problem List   Diagnosis     Cholangitis     Hypokalemia     Chronic fatigue     Essential hypertension     Hyperlipidemia     Mixed hearing loss, bilateral     Neoplasm of uncertain behavior of skin     PSC (primary sclerosing cholangitis)     Sicca syndrome (H)     Ulcerative pancolitis with complication (H)     Unsatisfactory cervical Papanicolaou smear     Cirrhosis of liver with ascites (H)     Sepsis (H)     SBP (spontaneous bacterial peritonitis) (H)     Choledocholithiasis     Encounter for replacement of biliary stent     Secondary esophageal varices without bleeding (H)     Primary sclerosing cholangitis     Liver transplant planned     Liver transplant candidate     Acute kidney failure, unspecified (H)     Severe protein-calorie malnutrition (H)     Encephalopathy     Hyperammonemia (H)     Leukocytosis, unspecified type     Hepatic encephalopathy (H)     Chronic liver failure without hepatic coma (H)      Immunosuppressed status (H)     S/P liver transplant (H)     Anemia due to chronic kidney disease     SOCIAL /FAMILY HISTORY: [x]                  No recent change    Past Medical History:   Diagnosis Date     Ascites      Biliary cirrhosis (H)      Cholangitis, sclerosing      Cirrhosis of liver with ascites (H) 3/3/2021     Hearing loss of left ear     wears a hearing aide     History of low potassium      Hyperlipidemia      Hypertension      SBP (spontaneous bacterial peritonitis) (H) 4/30/2021     Sjogren's syndrome (H)      Ulcerative colitis (H)      Ulcerative pancolitis (H)      Past Surgical History:   Procedure Laterality Date     BENCH LIVER N/A 11/18/2021    Procedure: Bench liver;  Surgeon: Ernesto Schmitt MD;  Location:  OR     COMBINED ESOPHAGOSCOPY, GASTROSCOPY, DUODENOSCOPY (EGD), REMOVE BILIARY STENT N/A 2/9/2022    Procedure: ESOPHAGOGASTRODUODENOSCOPY, WITH BILIARY STENT REMOVAL;  Surgeon: Leventhal, Thomas Michael, MD;  [place] [facility] [place]     CV CORONARY ANGIOGRAM N/A 8/25/2021    Procedure: Coronary Angiogram with possible intervention;  Surgeon: David Wilhelm MD;  Location:  HEART CARDIAC CATH LAB     ENDOSCOPIC RETROGRADE CHOLANGIOPANCREATOGRAM N/A 6/25/2021    Procedure: ENDOSCOPIC RETROGRADE CHOLANGIOPANCREATOGRAPHY with ballon sweep of bile ducts for stones, balloon dilation of bile ducts and bile duct stent placement;  Surgeon: Gregory Gabriel MD;  Location:  OR     ENDOSCOPIC RETROGRADE CHOLANGIOPANCREATOGRAM N/A 7/22/2021    Procedure: ENDOSCOPIC RETROGRADE CHOLANGIOPANCREATOGRAPHY STONE REMOVAL, DILATION AND STENT PLACEMENT;  Surgeon: Gregory Gabriel MD;  Location:  OR     ENDOSCOPIC RETROGRADE CHOLANGIOPANCREATOGRAPHY, EXCHANGE TUBE/STENT N/A 05/05/2021    Procedure: ENDOSCOPIC RETROGRADE CHOLANGIOPANCREATOGRAPHY WITH STENT EXCHANGE, STONE EXTRACTION, AND DILATION;  Surgeon: Gregory Gabriel MD;  Location:  OR     ENDOSCOPIC RETROGRADE CHOLANGIOPANCREATOGRAPHY,  EXCHANGE TUBE/STENT N/A 5/10/2021    Procedure: ENDOSCOPIC RETROGRADE CHOLANGIOPANCREATOGRAPHY with biliary dilation, stone removal, stent exchange;  Surgeon: Gregory Gabriel MD;  [facility]     ENDOSCOPIC RETROGRADE CHOLANGIOPANCREATOGRAPHY, EXCHANGE TUBE/STENT N/A 6/10/2021    Procedure: ENDOSCOPIC RETROGRADE CHOLANGIOPANCREATOGRAPHY, WITH biliary stent exchange, stone removal;  Surgeon: Woodrow Lott MD;  [facility]     ENDOSCOPIC RETROGRADE CHOLANGIOPANCREATOGRAPHY, EXCHANGE TUBE/STENT N/A 2021    Procedure: ENDOSCOPIC RETROGRADE CHOLANGIOPANCREATOGRAPHY with biliary dilation, stone removal, stent exchange;  Surgeon: Gregory Gabriel MD;  [facility]     ENDOSCOPIC RETROGRADE CHOLANGIOPANCREATOGRAPHY, EXCHANGE TUBE/STENT N/A 10/1/2021    Procedure: ENDOSCOPIC RETROGRADE CHOLANGIOPANCREATOGRAPHY with balloon sweep of bile ducts, bile duct stent exchanged;  Surgeon: Gregory Gabriel MD;  [facility]     ESOPHAGOSCOPY, GASTROSCOPY, DUODENOSCOPY (EGD), COMBINED N/A 2021    Procedure: ESOPHAGOGASTRODUODENOSCOPY (EGD);  Surgeon: Leventhal, Thomas Michael, MD;  [facility]     ESOPHAGOSCOPY, GASTROSCOPY, DUODENOSCOPY (EGD), COMBINED N/A 10/1/2021    Procedure: ESOPHAGOGASTRODUODENOSCOPY (EGD) with varices banding;  Surgeon: Gregory Gabriel MD;  Location[facility]     GYN SURGERY      bilat fallopian tubes and ovaries removed     PICC DOUBLE LUMEN PLACEMENT Right 2021    42cm (2cm external), Lateral brachial vein     RETURN LIVER TRANSPLANT N/A 2021    Procedure: CLOSURE, WOUND, ABDOMEN, SECONDARY, ABDOMINAL WASHOUT;  Surgeon: Ernesto Schmitt MD;  [facility]     TRANSPLANT LIVER RECIPIENT  DONOR N/A 2021    Procedure: Opening of abdomen, Abdominal Exploration and aborted liver transplant.;  Surgeon: Ernesto Schmitt MD;  Location[facility]     TRANSPLANT LIVER RECIPIENT  DONOR N/A 2021    Procedure: TRANSPLANT, LIVER, RECIPIENT,   DONOR;  Surgeon: Ernesto Schmitt MD;  Location:  OR     Social History     Socioeconomic History     Marital status:      Spouse name: Not on file     Number of children: Not on file     Years of education: Not on file     Highest education level: Not on file   Occupational History     Not on file   Tobacco Use     Smoking status: Never Smoker     Smokeless tobacco: Never Used   Vaping Use     Vaping Use: Never used   Substance and Sexual Activity     Alcohol use: No     Comment: Last drink was 2017     Drug use: No     Sexual activity: Not Currently     Partners: Male   Other Topics Concern     Parent/sibling w/ CABG, MI or angioplasty before 65F 55M? Not Asked   Social History Narrative     Not on file     Social Determinants of Health     Financial Resource Strain: Not on file   Food Insecurity: Not on file   Transportation Needs: Not on file   Physical Activity: Not on file   Stress: Not on file   Social Connections: Not on file   Intimate Partner Violence: Not on file   Housing Stability: Not on file     Prescription Medications as of 2/21/2022       Rx Number Disp Refills Start End Last Dispensed Date Next Fill Date Owning Pharmacy    aspirin (ASA) 81 MG EC tablet    1/7/2022        Sig: Take 1 tablet (81 mg) by mouth daily    Class: Historical    Route: Oral    multivitamin (CENTRUM SILVER) tablet            Sig: Take 1 tablet by mouth daily    Class: Historical    Route: Oral    mycophenolate (GENERIC EQUIVALENT) 250 MG capsule  540 capsule 3 2/17/2022        Sig: On 2/19 decrease mycophenolate to 500 mg po bid, on 2/26 decrease mycophenolate to 250 mg po bid for a week, on 3/5/22 stop mycophenolate.  Continue weekly labs thru 2/25, then do every other week x 4, if good at that point can decrease to monthly.    Class: Historical    Notes to Pharmacy: TXP DT 11/18/2021 (Liver) TXP Dischg DT 11/27/2021 DX Liver replaced by transplant Z94.4 TX Center [facility]  [place], [place])    pantoprazole (PROTONIX) 40 MG EC tablet  30 tablet 3 1/17/2022    [facility] #[id number] [place] [address] [address] [place] [address]    Sig: Take 1 tablet (40 mg) by mouth daily    Class: E-Prescribe    Route: Oral    sulfamethoxazole-trimethoprim (BACTRIM) 400-80 MG tablet  90 tablet 3 11/30/2021    [facility] [address] [place] [address] [address] [place] [address]    Sig: Take 1 tablet by mouth daily    Class: E-Prescribe    Route: Oral    tacrolimus (GENERIC EQUIVALENT) 1 MG capsule  90 capsule 3 2/8/2022        Sig: For prn use    Class: Historical    Notes to Pharmacy: TXP DT 11/18/2021 (Liver) TXP Dischg DT 11/27/2021 DX Liver replaced by transplant Z94.4 TX Center [facility] ([place], [place])    tacrolimus (GENERIC EQUIVALENT) 5 MG capsule  180 capsule 3 2/8/2022    [facility] [address] [place] [address] [address] [place] [address]    Sig: Take 1 capsule (5 mg) by mouth every 12 hours    Class: E-Prescribe    Notes to Pharmacy: Dose change    Route: Oral    ursodiol (ACTIGALL) 300 MG capsule  180 capsule 3 11/30/2021    [facility] #[id number] [place] [address] [address] [place] [address]    Sig: Take 1 capsule (300 mg) by mouth 2 times daily    Class: E-Prescribe    Route: Oral    valGANciclovir (VALCYTE) 450 MG tablet  90 tablet 3 1/17/2022 5/18/2022   [facility] [place] [address] [address] [place] [address]    Sig: Take 1 tablet (450 mg) by mouth daily    Class: E-Prescribe    Route: Oral        Diagnostic x-ray materials and Contrast dye   REVIEW OF SYSTEMS (check box if normal)  [x]               GENERAL  [x]                 PULMONARY [x]                GENITOURINARY  [x]                CNS                 [x]                 CARDIAC  [x]                 ENDOCRINE  [x]                 EARS,NOSE,THROAT [x]                 GASTROINTESTINAL [x]                 NEUROLOGIC    [x]                MUSCLOSKELTAL  [x]                  HEMATOLOGY      PHYSICAL EXAM (check box if normal)BP (!) 165/91   Pulse 83   Wt 60.8 kg (134 lb)   LMP  (LMP Unknown)   SpO2 100%   Breastfeeding No   BMI 21.63 kg/m      `    [x]            GENERAL:    [x]       EYES:  ICTERIC   []        YES  []                    NO  [x]           EXTREMITIES: Clubbing []       Y     [x]           N    [x]           EARS, NOSE, THROAT: Membranes Moist    YES   [x]                   NO []                  [x]           LUNGS:  CLEAR    YES       [x]                  NO    []                                [x]           SKIN: Jaundice           YES       []                  NO    [x]                   Rash: YES       []                  NO    [x]                                     [x]             HEART: Regular Rate          YES       [x]                  NO    []                   Incision Clean:  YES       [x]                  NO    []                                [x]                    ABDOMEN: Organomegaly YES       []                  NO    [x]                       [x]                    NEUROLOGICAL:  Nonfocal  YES       [x]                  NO    []                       [x]                    Hernia YES       []                  NO    [x]                   PSYCHIATRIC:  Appropriate  YES       [x]                  NO    []                       OTHER:                                                                                                   PAIN SCALE:: 3      Again, thank you for allowing me to participate in the care of your patient.        Sincerely,        Ernesto Schmitt MD

## 2022-02-21 NOTE — PROGRESS NOTES
HPI      ROS      Physical Exam    Transplant Surgery -OUTPATIENT IMMUNOSUPPRESSION PROGRESS NOTE    Date of Visit: 02/21/2022    Transplants:  11/18/2021 (Liver); Postoperative day:  95  ASSESMENT AND PLAN:  1.Graft Function:Liver allograft: no rejection or technical problems.    2.Immunosuppression Management: keep tacrolimus levels between 6-8 ng/dL; start prednisone 2.5 mg po daily  3.Hypertension: ok  4.Renal Function: better  5.Lab frequency: weekly  6.Other:  Wound healing well     Date: February 21, 2022    Transplant:  [x]                             Liver [x]                              Kidney []                             Pancreas []                              Other:             Chief Complaint:  Doing well    History of Present Illness:  Patient Active Problem List   Diagnosis     Cholangitis     Hypokalemia     Chronic fatigue     Essential hypertension     Hyperlipidemia     Mixed hearing loss, bilateral     Neoplasm of uncertain behavior of skin     PSC (primary sclerosing cholangitis)     Sicca syndrome (H)     Ulcerative pancolitis with complication (H)     Unsatisfactory cervical Papanicolaou smear     Cirrhosis of liver with ascites (H)     Sepsis (H)     SBP (spontaneous bacterial peritonitis) (H)     Choledocholithiasis     Encounter for replacement of biliary stent     Secondary esophageal varices without bleeding (H)     Primary sclerosing cholangitis     Liver transplant planned     Liver transplant candidate     Acute kidney failure, unspecified (H)     Severe protein-calorie malnutrition (H)     Encephalopathy     Hyperammonemia (H)     Leukocytosis, unspecified type     Hepatic encephalopathy (H)     Chronic liver failure without hepatic coma (H)     Immunosuppressed status (H)     S/P liver transplant (H)     Anemia due to chronic kidney disease     SOCIAL /FAMILY HISTORY: [x]                  No recent change    Past Medical History:   Diagnosis Date     Ascites      Biliary  cirrhosis (H)      Cholangitis, sclerosing      Cirrhosis of liver with ascites (H) 3/3/2021     Hearing loss of left ear     wears a hearing aide     History of low potassium      Hyperlipidemia      Hypertension      SBP (spontaneous bacterial peritonitis) (H) 4/30/2021     Sjogren's syndrome (H)      Ulcerative colitis (H)      Ulcerative pancolitis (H)      Past Surgical History:   Procedure Laterality Date     BENCH LIVER N/A 11/18/2021    Procedure: Bench liver;  Surgeon: Ernesto Schmitt MD;  [facility]     COMBINED ESOPHAGOSCOPY, GASTROSCOPY, DUODENOSCOPY (EGD), REMOVE BILIARY STENT N/A 2/9/2022    Procedure: ESOPHAGOGASTRODUODENOSCOPY, WITH BILIARY STENT REMOVAL;  Surgeon: Leventhal, Thomas Michael, MD;  [place] [facility] [place]     [place] CORONARY ANGIOGRAM N/A 8/25/2021    Procedure: Coronary Angiogram with possible intervention;  Surgeon: David Wilhelm MD;  Location:  HEART CARDIAC CATH LAB     ENDOSCOPIC RETROGRADE CHOLANGIOPANCREATOGRAM N/A 6/25/2021    Procedure: ENDOSCOPIC RETROGRADE CHOLANGIOPANCREATOGRAPHY with ballon sweep of bile ducts for stones, balloon dilation of bile ducts and bile duct stent placement;  Surgeon: Gregory Gabriel MD;  [facility]     ENDOSCOPIC RETROGRADE CHOLANGIOPANCREATOGRAM N/A 7/22/2021    Procedure: ENDOSCOPIC RETROGRADE CHOLANGIOPANCREATOGRAPHY STONE REMOVAL, DILATION AND STENT PLACEMENT;  Surgeon: Gregory Gabriel MD;  Location:  OR     ENDOSCOPIC RETROGRADE CHOLANGIOPANCREATOGRAPHY, EXCHANGE TUBE/STENT N/A 05/05/2021    Procedure: ENDOSCOPIC RETROGRADE CHOLANGIOPANCREATOGRAPHY WITH STENT EXCHANGE, STONE EXTRACTION, AND DILATION;  Surgeon: Gregory Gabriel MD;  [facility]     ENDOSCOPIC RETROGRADE CHOLANGIOPANCREATOGRAPHY, EXCHANGE TUBE/STENT N/A 5/10/2021    Procedure: ENDOSCOPIC RETROGRADE CHOLANGIOPANCREATOGRAPHY with biliary dilation, stone removal, stent exchange;  Surgeon: Gregory Gabriel MD;  [facility]     ENDOSCOPIC RETROGRADE  CHOLANGIOPANCREATOGRAPHY, EXCHANGE TUBE/STENT N/A 6/10/2021    Procedure: ENDOSCOPIC RETROGRADE CHOLANGIOPANCREATOGRAPHY, WITH biliary stent exchange, stone removal;  Surgeon: Woodrow Lott MD;  [facility]     ENDOSCOPIC RETROGRADE CHOLANGIOPANCREATOGRAPHY, EXCHANGE TUBE/STENT N/A 2021    Procedure: ENDOSCOPIC RETROGRADE CHOLANGIOPANCREATOGRAPHY with biliary dilation, stone removal, stent exchange;  Surgeon: Gregory Gabriel MD;  [facility]     ENDOSCOPIC RETROGRADE CHOLANGIOPANCREATOGRAPHY, EXCHANGE TUBE/STENT N/A 10/1/2021    Procedure: ENDOSCOPIC RETROGRADE CHOLANGIOPANCREATOGRAPHY with balloon sweep of bile ducts, bile duct stent exchanged;  Surgeon: Gregory Gabriel MD;  [facility]     ESOPHAGOSCOPY, GASTROSCOPY, DUODENOSCOPY (EGD), COMBINED N/A 2021    Procedure: ESOPHAGOGASTRODUODENOSCOPY (EGD);  Surgeon: Leventhal, Thomas Michael, MD;  [facility]     ESOPHAGOSCOPY, GASTROSCOPY, DUODENOSCOPY (EGD), COMBINED N/A 10/1/2021    Procedure: ESOPHAGOGASTRODUODENOSCOPY (EGD) with varices banding;  Surgeon: Gregory Gabriel MD;  [facility]     GYN SURGERY      bilat fallopian tubes and ovaries removed     PICC DOUBLE LUMEN PLACEMENT Right 2021    42cm (2cm external), Lateral brachial vein     RETURN LIVER TRANSPLANT N/A 2021    Procedure: CLOSURE, WOUND, ABDOMEN, SECONDARY, ABDOMINAL WASHOUT;  Surgeon: Ernesto Schmitt MD;  [facility]     TRANSPLANT LIVER RECIPIENT  DONOR N/A 2021    Procedure: Opening of abdomen, Abdominal Exploration and aborted liver transplant.;  Surgeon: Ernesto Schmitt MD;  [facility]     TRANSPLANT LIVER RECIPIENT  DONOR N/A 2021    Procedure: TRANSPLANT, LIVER, RECIPIENT,  DONOR;  Surgeon: Ernesto Schmitt MD;  [facility]     Social History     Socioeconomic History     Marital status:      Spouse name: Not on file     Number of children: Not on file     Years of education:  Not on file     Highest education level: Not on file   Occupational History     Not on file   Tobacco Use     Smoking status: Never Smoker     Smokeless tobacco: Never Used   Vaping Use     Vaping Use: Never used   Substance and Sexual Activity     Alcohol use: No     Comment: Last drink was 2017     Drug use: No     Sexual activity: Not Currently     Partners: Male   Other Topics Concern     Parent/sibling w/ CABG, MI or angioplasty before 65F 55M? Not Asked   Social History Narrative     Not on file     Social Determinants of Health     Financial Resource Strain: Not on file   Food Insecurity: Not on file   Transportation Needs: Not on file   Physical Activity: Not on file   Stress: Not on file   Social Connections: Not on file   Intimate Partner Violence: Not on file   Housing Stability: Not on file     Prescription Medications as of 2/21/2022       Rx Number Disp Refills Start End Last Dispensed Date Next Fill Date Owning Pharmacy    aspirin (ASA) 81 MG EC tablet    1/7/2022        Sig: Take 1 tablet (81 mg) by mouth daily    Class: Historical    Route: Oral    multivitamin (CENTRUM SILVER) tablet            Sig: Take 1 tablet by mouth daily    Class: Historical    Route: Oral    mycophenolate (GENERIC EQUIVALENT) 250 MG capsule  540 capsule 3 2/17/2022        Sig: On 2/19 decrease mycophenolate to 500 mg po bid, on 2/26 decrease mycophenolate to 250 mg po bid for a week, on 3/5/22 stop mycophenolate.  Continue weekly labs thru 2/25, then do every other week x 4, if good at that point can decrease to monthly.    Class: Historical    Notes to Pharmacy: TXP DT 11/18/2021 (Liver) TXP Dischg DT 11/27/2021 DX Liver replaced by transplant Z94.4 TX Center [facility] ([place], [place])    pantoprazole (PROTONIX) 40 MG EC tablet  30 tablet 3 1/17/2022    [facility] #[id number] - [place], [place] - [address] [place] [address] [address] [facility] [address]    Sig: Take 1 tablet (40 mg) by  mouth daily    Class: E-Prescribe    Route: Oral    sulfamethoxazole-trimethoprim (BACTRIM) 400-80 MG tablet  90 tablet 3 11/30/2021    [facility] [place] [address] [address] [place] [address]    Sig: Take 1 tablet by mouth daily    Class: E-Prescribe    Route: Oral    tacrolimus (GENERIC EQUIVALENT) 1 MG capsule  90 capsule 3 2/8/2022        Sig: For prn use    Class: Historical    Notes to Pharmacy: TXP DT 11/18/2021 (Liver) TXP Dischg DT 11/27/2021 DX Liver replaced by transplant Z94.4 TX Center [facility] ([place], [place])    tacrolimus (GENERIC EQUIVALENT) 5 MG capsule  180 capsule 3 2/8/2022    [facility] [address] [place] [address] [address] [place] [address]    Sig: Take 1 capsule (5 mg) by mouth every 12 hours    Class: E-Prescribe    Notes to Pharmacy: Dose change    Route: Oral    ursodiol (ACTIGALL) 300 MG capsule  180 capsule 3 11/30/2021    [facility] #[id number] [place] [address] [address] [place] [address]    Sig: Take 1 capsule (300 mg) by mouth 2 times daily    Class: E-Prescribe    Route: Oral    valGANciclovir (VALCYTE) 450 MG tablet  90 tablet 3 1/17/2022 5/18/2022   [facility] STORE [address] [place] [address] [address] [place] [address]    Sig: Take 1 tablet (450 mg) by mouth daily    Class: E-Prescribe    Route: Oral        Diagnostic x-ray materials and Contrast dye   REVIEW OF SYSTEMS (check box if normal)  [x]               GENERAL  [x]                 PULMONARY [x]                GENITOURINARY  [x]                CNS                 [x]                 CARDIAC  [x]                 ENDOCRINE  [x]                EARS,NOSE,THROAT [x]                 GASTROINTESTINAL [x]                 NEUROLOGIC    [x]                MUSCLOSKELTAL  [x]                  HEMATOLOGY      PHYSICAL EXAM (check box if normal)BP (!) 165/91   Pulse 83   Wt  60.8 kg (134 lb)   LMP  (LMP Unknown)   SpO2 100%   Breastfeeding No   BMI 21.63 kg/m      `    [x]            GENERAL:    [x]       EYES:  ICTERIC   []        YES  []                    NO  [x]           EXTREMITIES: Clubbing []       Y     [x]           N    [x]           EARS, NOSE, THROAT: Membranes Moist    YES   [x]                   NO []                  [x]           LUNGS:  CLEAR    YES       [x]                  NO    []                                [x]           SKIN: Jaundice           YES       []                  NO    [x]                   Rash: YES       []                  NO    [x]                                     [x]             HEART: Regular Rate          YES       [x]                  NO    []                   Incision Clean:  YES       [x]                  NO    []                                [x]                    ABDOMEN: Organomegaly YES       []                  NO    [x]                       [x]                    NEUROLOGICAL:  Nonfocal  YES       [x]                  NO    []                       [x]                    Hernia YES       []                  NO    [x]                   PSYCHIATRIC:  Appropriate  YES       [x]                  NO    []                       OTHER:                                                                                                   PAIN SCALE:: 3

## 2022-02-24 ENCOUNTER — TELEPHONE (OUTPATIENT)
Dept: TRANSPLANT | Facility: CLINIC | Age: 57
End: 2022-02-24
Payer: COMMERCIAL

## 2022-02-24 NOTE — TELEPHONE ENCOUNTER
My chart message from Berta:    I start back to work next Thursday March 3rd. Dr CHANCE gave his verbal approval on Monday. I do need a letter stating it s ok for me to return to work. I ll be working remotely. Can you send me a letter via [employer] and I ll forward to my employer? Thanks!     I also wanted to let you know I started a blood pressure med this morning-Amlodipine Besylate 5mg once a day. Dr CHANCE wanted my primary to prescribe. I have an appt to follow up with her on March 30th.     Routed to Anne Marie DAVIS to add amlodipine to Epic and compose / send return to work letter.

## 2022-03-07 ENCOUNTER — LAB (OUTPATIENT)
Dept: LAB | Facility: CLINIC | Age: 57
End: 2022-03-07
Payer: COMMERCIAL

## 2022-03-07 DIAGNOSIS — Z94.4 LIVER REPLACED BY TRANSPLANT (H): ICD-10-CM

## 2022-03-07 LAB
ALBUMIN SERPL-MCNC: 3.8 G/DL (ref 3.4–5)
ALP SERPL-CCNC: 75 U/L (ref 40–150)
ALT SERPL W P-5'-P-CCNC: 47 U/L (ref 0–50)
ANION GAP SERPL CALCULATED.3IONS-SCNC: 8 MMOL/L (ref 3–14)
AST SERPL W P-5'-P-CCNC: 21 U/L (ref 0–45)
BILIRUB DIRECT SERPL-MCNC: 0.1 MG/DL (ref 0–0.2)
BILIRUB SERPL-MCNC: 0.3 MG/DL (ref 0.2–1.3)
BUN SERPL-MCNC: 26 MG/DL (ref 7–30)
CALCIUM SERPL-MCNC: 9.1 MG/DL (ref 8.5–10.1)
CHLORIDE BLD-SCNC: 115 MMOL/L (ref 94–109)
CO2 SERPL-SCNC: 20 MMOL/L (ref 20–32)
CREAT SERPL-MCNC: 0.86 MG/DL (ref 0.52–1.04)
ERYTHROCYTE [DISTWIDTH] IN BLOOD BY AUTOMATED COUNT: 16.1 % (ref 10–15)
GFR SERPL CREATININE-BSD FRML MDRD: 78 ML/MIN/1.73M2
GLUCOSE BLD-MCNC: 94 MG/DL (ref 70–99)
HCT VFR BLD AUTO: 35 % (ref 35–47)
HGB BLD-MCNC: 11.5 G/DL (ref 11.7–15.7)
MAGNESIUM SERPL-MCNC: 2 MG/DL (ref 1.6–2.3)
MCH RBC QN AUTO: 27.7 PG (ref 26.5–33)
MCHC RBC AUTO-ENTMCNC: 32.9 G/DL (ref 31.5–36.5)
MCV RBC AUTO: 84 FL (ref 78–100)
PHOSPHATE SERPL-MCNC: 4.6 MG/DL (ref 2.5–4.5)
PLATELET # BLD AUTO: 160 10E3/UL (ref 150–450)
POTASSIUM BLD-SCNC: 3.9 MMOL/L (ref 3.4–5.3)
PROT SERPL-MCNC: 7.3 G/DL (ref 6.8–8.8)
RBC # BLD AUTO: 4.15 10E6/UL (ref 3.8–5.2)
SODIUM SERPL-SCNC: 143 MMOL/L (ref 133–144)
TACROLIMUS BLD-MCNC: 8 UG/L (ref 5–15)
TME LAST DOSE: NORMAL H
TME LAST DOSE: NORMAL H
WBC # BLD AUTO: 3.5 10E3/UL (ref 4–11)

## 2022-03-07 PROCEDURE — 85027 COMPLETE CBC AUTOMATED: CPT

## 2022-03-07 PROCEDURE — 36415 COLL VENOUS BLD VENIPUNCTURE: CPT

## 2022-03-07 PROCEDURE — 82248 BILIRUBIN DIRECT: CPT

## 2022-03-07 PROCEDURE — 84100 ASSAY OF PHOSPHORUS: CPT

## 2022-03-07 PROCEDURE — 80197 ASSAY OF TACROLIMUS: CPT

## 2022-03-07 PROCEDURE — 83735 ASSAY OF MAGNESIUM: CPT

## 2022-03-07 PROCEDURE — 80053 COMPREHEN METABOLIC PANEL: CPT

## 2022-03-21 ENCOUNTER — LAB (OUTPATIENT)
Dept: LAB | Facility: CLINIC | Age: 57
End: 2022-03-21
Payer: COMMERCIAL

## 2022-03-21 ENCOUNTER — IMMUNIZATION (OUTPATIENT)
Dept: NURSING | Facility: CLINIC | Age: 57
End: 2022-03-21
Payer: COMMERCIAL

## 2022-03-21 ENCOUNTER — TELEPHONE (OUTPATIENT)
Dept: TRANSPLANT | Facility: CLINIC | Age: 57
End: 2022-03-21

## 2022-03-21 ENCOUNTER — OFFICE VISIT (OUTPATIENT)
Dept: GASTROENTEROLOGY | Facility: CLINIC | Age: 57
End: 2022-03-21
Attending: INTERNAL MEDICINE
Payer: COMMERCIAL

## 2022-03-21 VITALS
DIASTOLIC BLOOD PRESSURE: 88 MMHG | OXYGEN SATURATION: 100 % | WEIGHT: 141.6 LBS | BODY MASS INDEX: 22.76 KG/M2 | HEART RATE: 94 BPM | HEIGHT: 66 IN | SYSTOLIC BLOOD PRESSURE: 159 MMHG

## 2022-03-21 DIAGNOSIS — Z94.4 S/P LIVER TRANSPLANT (H): ICD-10-CM

## 2022-03-21 DIAGNOSIS — Z94.4 S/P LIVER TRANSPLANT (H): Primary | ICD-10-CM

## 2022-03-21 DIAGNOSIS — K51.019 ULCERATIVE PANCOLITIS WITH COMPLICATION (H): Primary | ICD-10-CM

## 2022-03-21 DIAGNOSIS — K83.01 PRIMARY SCLEROSING CHOLANGITIS (H): ICD-10-CM

## 2022-03-21 DIAGNOSIS — D84.9 IMMUNOSUPPRESSED STATUS (H): ICD-10-CM

## 2022-03-21 DIAGNOSIS — Z94.4 LIVER REPLACED BY TRANSPLANT (H): ICD-10-CM

## 2022-03-21 DIAGNOSIS — Z48.298 CARE AFTER ORGAN TRANSPLANT: ICD-10-CM

## 2022-03-21 PROBLEM — Z46.89 ENCOUNTER FOR REPLACEMENT OF BILIARY STENT: Status: RESOLVED | Noted: 2021-05-17 | Resolved: 2022-03-21

## 2022-03-21 PROBLEM — Z76.82 LIVER TRANSPLANT CANDIDATE: Status: RESOLVED | Noted: 2021-08-19 | Resolved: 2022-03-21

## 2022-03-21 PROBLEM — R53.82 CHRONIC FATIGUE: Status: RESOLVED | Noted: 2017-12-11 | Resolved: 2022-03-21

## 2022-03-21 PROBLEM — A41.9 SEPSIS (H): Status: RESOLVED | Noted: 2021-04-29 | Resolved: 2022-03-21

## 2022-03-21 PROBLEM — D72.829 LEUKOCYTOSIS, UNSPECIFIED TYPE: Status: RESOLVED | Noted: 2021-11-06 | Resolved: 2022-03-21

## 2022-03-21 PROBLEM — R87.615 UNSATISFACTORY CERVICAL PAPANICOLAOU SMEAR: Status: RESOLVED | Noted: 2019-05-04 | Resolved: 2022-03-21

## 2022-03-21 PROBLEM — K65.2 SBP (SPONTANEOUS BACTERIAL PERITONITIS) (H): Status: RESOLVED | Noted: 2021-04-30 | Resolved: 2022-03-21

## 2022-03-21 LAB
ALBUMIN SERPL-MCNC: 3.9 G/DL (ref 3.4–5)
ALP SERPL-CCNC: 89 U/L (ref 40–150)
ALT SERPL W P-5'-P-CCNC: 85 U/L (ref 0–50)
ANION GAP SERPL CALCULATED.3IONS-SCNC: 8 MMOL/L (ref 3–14)
AST SERPL W P-5'-P-CCNC: 33 U/L (ref 0–45)
BILIRUB DIRECT SERPL-MCNC: 0.1 MG/DL (ref 0–0.2)
BILIRUB SERPL-MCNC: 0.5 MG/DL (ref 0.2–1.3)
BUN SERPL-MCNC: 28 MG/DL (ref 7–30)
CALCIUM SERPL-MCNC: 9.3 MG/DL (ref 8.5–10.1)
CHLORIDE BLD-SCNC: 111 MMOL/L (ref 94–109)
CO2 SERPL-SCNC: 22 MMOL/L (ref 20–32)
CREAT SERPL-MCNC: 0.91 MG/DL (ref 0.52–1.04)
ERYTHROCYTE [DISTWIDTH] IN BLOOD BY AUTOMATED COUNT: 15.9 % (ref 10–15)
GFR SERPL CREATININE-BSD FRML MDRD: 73 ML/MIN/1.73M2
GLUCOSE BLD-MCNC: 89 MG/DL (ref 70–99)
HCT VFR BLD AUTO: 35.1 % (ref 35–47)
HGB BLD-MCNC: 11.3 G/DL (ref 11.7–15.7)
MAGNESIUM SERPL-MCNC: 2 MG/DL (ref 1.6–2.3)
MCH RBC QN AUTO: 27.7 PG (ref 26.5–33)
MCHC RBC AUTO-ENTMCNC: 32.2 G/DL (ref 31.5–36.5)
MCV RBC AUTO: 86 FL (ref 78–100)
PHOSPHATE SERPL-MCNC: 4.6 MG/DL (ref 2.5–4.5)
PLATELET # BLD AUTO: 143 10E3/UL (ref 150–450)
POTASSIUM BLD-SCNC: 4 MMOL/L (ref 3.4–5.3)
PROT SERPL-MCNC: 7 G/DL (ref 6.8–8.8)
RBC # BLD AUTO: 4.08 10E6/UL (ref 3.8–5.2)
SODIUM SERPL-SCNC: 141 MMOL/L (ref 133–144)
TACROLIMUS BLD-MCNC: 8.8 UG/L (ref 5–15)
TME LAST DOSE: NORMAL H
TME LAST DOSE: NORMAL H
WBC # BLD AUTO: 3.2 10E3/UL (ref 4–11)

## 2022-03-21 PROCEDURE — 82248 BILIRUBIN DIRECT: CPT

## 2022-03-21 PROCEDURE — 84100 ASSAY OF PHOSPHORUS: CPT

## 2022-03-21 PROCEDURE — G0463 HOSPITAL OUTPT CLINIC VISIT: HCPCS

## 2022-03-21 PROCEDURE — 36415 COLL VENOUS BLD VENIPUNCTURE: CPT

## 2022-03-21 PROCEDURE — 80197 ASSAY OF TACROLIMUS: CPT

## 2022-03-21 PROCEDURE — 0013A PR COVID VAC MODERNA 100 MCG/0.5 ML IM: CPT

## 2022-03-21 PROCEDURE — 91301 PR COVID VAC MODERNA 100 MCG/0.5 ML IM: CPT

## 2022-03-21 PROCEDURE — 80053 COMPREHEN METABOLIC PANEL: CPT

## 2022-03-21 PROCEDURE — 83735 ASSAY OF MAGNESIUM: CPT

## 2022-03-21 PROCEDURE — 85027 COMPLETE CBC AUTOMATED: CPT

## 2022-03-21 PROCEDURE — 99215 OFFICE O/P EST HI 40 MIN: CPT | Performed by: INTERNAL MEDICINE

## 2022-03-21 ASSESSMENT — PAIN SCALES - GENERAL: PAINLEVEL: NO PAIN (0)

## 2022-03-21 NOTE — NURSING NOTE
"Chief Complaint   Patient presents with     RECHECK     liver TX     BP (!) 159/88   Pulse 94   Ht 1.676 m (5' 6\")   Wt 64.2 kg (141 lb 9.6 oz)   LMP  (LMP Unknown)   SpO2 100%   BMI 22.85 kg/m    Nam Mccarthy MA  "

## 2022-03-21 NOTE — LETTER
3/21/2022     RE: Berta Nava  [id number]  [facility] [id number]    Dear Colleague,    Thank you for referring your patient, Berta Nava, to the [facility] [place]. Please see a copy of my visit note below.    Date of Service: 3/21/2022     Subjective:          Berta Nava is a 56 year old female presenting for follow up with history of liver transplantation    History of Present Illness   Berta Nava is a 56 year old female with past medical history of hypertension, microscopic colitis, ulcerative colitis, pancreatic mucinous cystadenoma, primary sclerosing cholangitis who presents with concerns of cirrhosis complicated by esophageal varices, ascites.  She is s/p  donor liver transplant on 2021 with reexploration abdominal washout and closure of abdomen on 2021.     Postoperative course has been relatively uncomplicated.  She did present for EGD for stent removal without difficulty.  She returned to work approximately 2 to 3 weeks ago, reports that it is going well.     With regard to her immunosuppression she is currently on a regimen of tacrolimus twice daily with a goal trough level of 5-6.  This was chosen secondary to significant tremors in December.  In the setting she was restarted on prednisone 2.5 mg daily in late February, shortly before she was weaned off of her twice daily MMF.    She has been having issues with hypertension since transplantation was recently started on amlodipine 5 mg daily.  Noted that she has persistent issues with hypertension and is seeing her primary doctor next week.    She reports that she does have some tremors and tingling of her extremities that come and go.  She notes that her appetite is improved, and she has put on some weight since her transplant.    She has many questions today regarding labs, for history of ulcerative colitis, and medications that she is taking.      Surgical  Course  - OLT date: November 18, 2021  - etiology: PSC  - donor: type DBD  - CMV status D positive/R negative  - operation: Surgical technique caval replacement, biliary anastomosis: Duct to duct  - CiT 6 hrs 22 min, WiT 51 min  - Episodes of Rejection: none  - Biliary complications: none; stent removed 2/9/2022  - Other complications of immediate post-operative course: none    Past Medical History:  Past Medical History:   Diagnosis Date     Ascites      Biliary cirrhosis (H)      Cholangitis, sclerosing      Cirrhosis of liver with ascites (H) 3/3/2021     Hearing loss of left ear     wears a hearing aide     History of low potassium      Hyperlipidemia      Hypertension      SBP (spontaneous bacterial peritonitis) (H) 4/30/2021     Sjogren's syndrome (H)      Ulcerative colitis (H)      Ulcerative pancolitis (H)        Past Surgical History:  Past Surgical History:   Procedure Laterality Date     BENCH LIVER N/A 11/18/2021    Procedure: Bench liver;  Surgeon: Ernesto Schmitt MD;  Location:  OR     COMBINED ESOPHAGOSCOPY, GASTROSCOPY, DUODENOSCOPY (EGD), REMOVE BILIARY STENT N/A 2/9/2022    Procedure: ESOPHAGOGASTRODUODENOSCOPY, WITH BILIARY STENT REMOVAL;  Surgeon: Leventhal, Thomas Michael, MD;  Location: [facility] OR     CV CORONARY ANGIOGRAM N/A 8/25/2021    Procedure: Coronary Angiogram with possible intervention;  Surgeon: David Wilhelm MD;  Location:  HEART CARDIAC CATH LAB     ENDOSCOPIC RETROGRADE CHOLANGIOPANCREATOGRAM N/A 6/25/2021    Procedure: ENDOSCOPIC RETROGRADE CHOLANGIOPANCREATOGRAPHY with ballon sweep of bile ducts for stones, balloon dilation of bile ducts and bile duct stent placement;  Surgeon: Gregory Gabriel MD;  Location:  OR     ENDOSCOPIC RETROGRADE CHOLANGIOPANCREATOGRAM N/A 7/22/2021    Procedure: ENDOSCOPIC RETROGRADE CHOLANGIOPANCREATOGRAPHY STONE REMOVAL, DILATION AND STENT PLACEMENT;  Surgeon: Gregory Gabriel MD;  Location:  OR     ENDOSCOPIC RETROGRADE  CHOLANGIOPANCREATOGRAPHY, EXCHANGE TUBE/STENT N/A 2021    Procedure: ENDOSCOPIC RETROGRADE CHOLANGIOPANCREATOGRAPHY WITH STENT EXCHANGE, STONE EXTRACTION, AND DILATION;  Surgeon: Gregory Gabriel MD;  Location: [facility] OR     ENDOSCOPIC RETROGRADE CHOLANGIOPANCREATOGRAPHY, EXCHANGE TUBE/STENT N/A 5/10/2021    Procedure: ENDOSCOPIC RETROGRADE CHOLANGIOPANCREATOGRAPHY with biliary dilation, stone removal, stent exchange;  Surgeon: Gregory Gabriel MD;  Location: [facility] OR     ENDOSCOPIC RETROGRADE CHOLANGIOPANCREATOGRAPHY, EXCHANGE TUBE/STENT N/A 6/10/2021    Procedure: ENDOSCOPIC RETROGRADE CHOLANGIOPANCREATOGRAPHY, WITH biliary stent exchange, stone removal;  Surgeon: Woodrow Lott MD;  Location: [facility] OR     ENDOSCOPIC RETROGRADE CHOLANGIOPANCREATOGRAPHY, EXCHANGE TUBE/STENT N/A 2021    Procedure: ENDOSCOPIC RETROGRADE CHOLANGIOPANCREATOGRAPHY with biliary dilation, stone removal, stent exchange;  Surgeon: Gregory Gabriel MD;  Location: [facility] OR     ENDOSCOPIC RETROGRADE CHOLANGIOPANCREATOGRAPHY, EXCHANGE TUBE/STENT N/A 10/1/2021    Procedure: ENDOSCOPIC RETROGRADE CHOLANGIOPANCREATOGRAPHY with balloon sweep of bile ducts, bile duct stent exchanged;  Surgeon: Gregory Gabriel MD;  Location: [facility] OR     ESOPHAGOSCOPY, GASTROSCOPY, DUODENOSCOPY (EGD), COMBINED N/A 2021    Procedure: ESOPHAGOGASTRODUODENOSCOPY (EGD);  Surgeon: Leventhal, Thomas Michael, MD;  Location: [facility] GI     ESOPHAGOSCOPY, GASTROSCOPY, DUODENOSCOPY (EGD), COMBINED N/A 10/1/2021    Procedure: ESOPHAGOGASTRODUODENOSCOPY (EGD) with varices banding;  Surgeon: Gregory Gabriel MD;  Location: [facility] OR     GYN SURGERY      bilat fallopian tubes and ovaries removed     PICC DOUBLE LUMEN PLACEMENT Right 2021    42cm (2cm external), Lateral brachial vein     RETURN LIVER TRANSPLANT N/A 2021    Procedure: CLOSURE, WOUND, ABDOMEN, SECONDARY, ABDOMINAL WASHOUT;  Surgeon: Ernesto Schmitt MD;  Location: [facility] OR     TRANSPLANT LIVER RECIPIENT   "DONOR N/A 2021    Procedure: Opening of abdomen, Abdominal Exploration and aborted liver transplant.;  Surgeon: Ernesto Schmitt MD;  Location: [facility] OR     TRANSPLANT LIVER RECIPIENT  DONOR N/A 2021    Procedure: TRANSPLANT, LIVER, RECIPIENT,  DONOR;  Surgeon: Ernesto Schmitt MD;  Location: [facility] OR       Past Social History:  Social History     Tobacco Use     Smoking status: Never Smoker     Smokeless tobacco: Never Used   Vaping Use     Vaping Use: Never used   Substance Use Topics     Alcohol use: No     Comment: Last drink was 2017     Drug use: No        Family History:  Family History   Problem Relation Age of Onset     Cancer Mother         angiosarcoma     Coronary Artery Disease Early Onset Father         MI age 46     Heart Transplant Father      Liver Disease No family hx of      Ulcerative Colitis No family hx of      Crohn's Disease No family hx of        Review of Systems  A complete 10 point review of systems was asked and answered in the negative unless specifically commented upon in the HPI    Current Outpatient Medications   Medication     amLODIPine (NORVASC) 5 MG tablet     aspirin (ASA) 81 MG EC tablet     multivitamin (CENTRUM SILVER) tablet     pantoprazole (PROTONIX) 40 MG EC tablet     sulfamethoxazole-trimethoprim (BACTRIM) 400-80 MG tablet     tacrolimus (GENERIC EQUIVALENT) 1 MG capsule     tacrolimus (GENERIC EQUIVALENT) 5 MG capsule     ursodiol (ACTIGALL) 300 MG capsule     valGANciclovir (VALCYTE) 450 MG tablet     mycophenolate (GENERIC EQUIVALENT) 250 MG capsule     No current facility-administered medications for this visit.       Objective:         Vitals:    22 1257   BP: (!) 159/88   Pulse: 94   SpO2: 100%   Weight: 64.2 kg (141 lb 9.6 oz)   Height: 1.676 m (5' 6\")     Body mass index is 22.85 kg/m .     Physical Exam    Vitals reviewed.   Constitutional: Well-developed, well-nourished, in no apparent distress.    HEENT: Normocephalic. no " scleral icterus.  Neck/Lymph: Normal ROM, supple. No thyromegaly.  No lymphadenopathy  Cardiac:  Regular rate and rhythm.  No overt murmurs  Respiratory: Clear to auscultation bilaterally.  No wheezes or rales  GI:  Abdomen soft, non-distended, non-tender. BS present. Bilateral subcostal incision - well healed. no hernia  Skin:  Skin is warm and dry. No rash noted.  Peripheral Vascular: no lower extremity edema. 2+ pulses in all extremities  Musculoskeletal:  ROM intact, normal muscle bulk    Psychiatric: Normal mood and affect. Behavior is normal.  Neuro: no tremor, A&Ox3    Labs and Diagnostic tests:  CBC w/Diff    Lab Results   Component Value Date/Time    WBC 3.2 (L) 2022 08:55 AM    WBC 15.2 (H) 2021 09:09 AM    RBC 4.08 2022 08:55 AM    RBC 3.57 (L) 2021 09:09 AM    HGB 11.3 (L) 2022 08:55 AM    HGB 10.7 (L) 2021 09:09 AM    HCT 35.1 2022 08:55 AM    HCT 32.9 (L) 2021 09:09 AM    MCV 86 2022 08:55 AM    MCV 92 2021 09:09 AM    MCH 27.7 2022 08:55 AM    MCH 30.0 2021 09:09 AM    MCHC 32.2 2022 08:55 AM    MCHC 32.5 2021 09:09 AM    RDW 15.9 (H) 2022 08:55 AM    RDW 16.4 (H) 2021 09:09 AM         Comprehensive Metabolic Profile    Lab Results   Component Value Date/Time     2022 08:55 AM     (L) 2021 09:09 AM    CO2 22 2022 08:55 AM    CO2 21 2021 09:09 AM    BUN 28 2022 08:55 AM    BUN 23 2021 09:09 AM    CR 0.91 2022 08:55 AM    CR 0.69 2021 09:09 AM    Lab Results   Component Value Date/Time    ALBUMIN 3.9 2022 08:55 AM    ALBUMIN 2.8 (L) 2021 09:09 AM    ALKPHOS 89 2022 08:55 AM    ALKPHOS 386 (H) 2021 09:09 AM    AST 33 2022 08:55 AM    AST 58 (H) 2021 09:09 AM    ALT 85 (H) 2022 08:55 AM    ALT 32 2021 09:09 AM          Assessment and Plan:    S/P Liver Transplantation:   -She is status post  donor  liver transplantation on November 18 for PSC related cirrhosis  -Postoperative course has been relatively uneventful to date  -She has had excellent allograft function, however, noted elevation in serum ALT this a.m. of unclear significance    -We will plan to repeat labs in 1 week  -Otherwise we will continue labs per protocol  -We will continue the ursodiol until we have stabilization of her liver tests.  No overt data to support ongoing use post transplant for patients with PSC  -She continues on aspirin 81 mg and will do so for 1 year post transplant    Immunosuppression:   -She is on a regimen of tacrolimus 6 mg twice daily with a resultant goal trough level of 5-6.  This has been changed given concerns for tremors in the early post transplant setting.  She was recently weaned off of MMF on March 5 and has been placed on prednisone 2.5 mg daily.    -We will await tacrolimus level    -We will not make further changes in her immunosuppression until we have repeat labs in 1 week  - Will plan to check immunosuppression trough levels per protocol to assess for adequate target dosing and will assess CBC and kidney function for toxicity of medications    Infectious Prophylaxis:   - PJP and CMV prophylaxis per protocol    Kidney Health:   -No acute issues at this time    Hypertension:  - On amlodipine 5mg daily; inadequate  - Will defer to PCP for escalation of BP meds with Goal BP </= 130/90    Pan ulcerative colitis:  -Noted that her last colonoscopy was in December 2020; she is overdue  -We will plan for exam in June 2022, when she is approximately 6 months post transplant for surveillance    Nutrition/Weight:    It is very common for patients to put on significant weight after liver transplantation, as they become conditioned to eating as much as possible to maintain a healthy weight pre-transplant.  There is a very significant risk of developing fatty liver and non-alcoholic steatohepatitis in post-transplant  patients, even in those who were not transplanted for ALVES cirrhosis.  - Please work on eating a diet that is rice in fresh fruits and vegetables, moderate amounts of lean protein and dairy, and modest amounts of carbohydrates and fats.  - An exercise plan/regimen is an essential part of remaining healthy in the post-transplant setting and we recommend 30-35 minutes of exercise at least 4 days a week    Routine Health Care:  - You need to establish and maintain care with a primary care physician to manage: hypertension, high cholesterol, abnormal blood sugars - as these are all potential complications of your health after liver transplantation and will need a local physician to manage these issues.  - All patients with liver diseases (even post transplant) are at an increased risk for osteoporosis.  We strongly recommend screening for Vitamin D deficiency at least twice yearly with aggressive supplementation/replacement as indicated.    - We recommend a screening DEXA scan to evaluate for osteoporosis.  If present, should treat with calcium, Vitamin D supplementation, and recommend consideration of bisphosphonate therapy.  Also recommend follow up DEXA scans to evaluate for improvement of bone density on therapy.  - Recommend yearly skin exams with dermatology, and if skin cancers are found, recommend at least twice yearly exams  - Recommend post transplant patients stay up to date for cancer screening: mammograms/PAP for women and prostate cancer screening for men, and colon cancer screening for all.  - Practice good hygiene with washing of hands and maintaining a clean living space as this will decrease your chances of developing an infection in the post-transplantation setting  - Eat a health diet: rich in fresh fruits and vegetables, lean proteins, and minimize carbohydrate and fat intake.  - Remain physically active: this is key to keeping the liver transplant healthy and patient's feeling strong and  healthy    Follow Up:  6 months     Thank you very much for the opportunity to participate in the care of this patient.  If you have any further questions, please don't hesitate to contact our office.    __________________________________  Thomas M. Leventhal, M.D.   of Medicine  Advanced & Transplant Hepatology  [facility]

## 2022-03-21 NOTE — Clinical Note
1) repeat labs in 1 week  2) I will comment on IS changes after that results  3) I ordered a colonoscopy for June 2022    - I do not understand the IS regimen, but am trying

## 2022-03-21 NOTE — LETTER
3/21/2022     RE: eBrta Nava  [id number]  [facility] [id number]      Date of Service: 3/21/2022     Subjective:          Berta Nava is a 56 year old female presenting for follow up with history of liver transplantation    History of Present Illness   Berta Nava is a 56 year old female with past medical history of hypertension, microscopic colitis, ulcerative colitis, pancreatic mucinous cystadenoma, primary sclerosing cholangitis who presents with concerns of cirrhosis complicated by esophageal varices, ascites.  She is s/p  donor liver transplant on 2021 with reexploration abdominal washout and closure of abdomen on 2021.     Postoperative course has been relatively uncomplicated.  She did present for EGD for stent removal without difficulty.  She returned to work approximately 2 to 3 weeks ago, reports that it is going well.     With regard to her immunosuppression she is currently on a regimen of tacrolimus twice daily with a goal trough level of 5-6.  This was chosen secondary to significant tremors in December.  In the setting she was restarted on prednisone 2.5 mg daily in late February, shortly before she was weaned off of her twice daily MMF.    She has been having issues with hypertension since transplantation was recently started on amlodipine 5 mg daily.  Noted that she has persistent issues with hypertension and is seeing her primary doctor next week.    She reports that she does have some tremors and tingling of her extremities that come and go.  She notes that her appetite is improved, and she has put on some weight since her transplant.    She has many questions today regarding labs, for history of ulcerative colitis, and medications that she is taking.      Surgical Course  - OLT date: 2021  - etiology: PSC  - donor: type DBD  - CMV status D positive/R negative  - operation: Surgical technique caval replacement, biliary  anastomosis: Duct to duct  - CiT 6 hrs 22 min, WiT 51 min  - Episodes of Rejection: none  - Biliary complications: none; stent removed 2/9/2022  - Other complications of immediate post-operative course: none    Past Medical History:  Past Medical History:   Diagnosis Date     Ascites      Biliary cirrhosis (H)      Cholangitis, sclerosing      Cirrhosis of liver with ascites (H) 3/3/2021     Hearing loss of left ear     wears a hearing aide     History of low potassium      Hyperlipidemia      Hypertension      SBP (spontaneous bacterial peritonitis) (H) 4/30/2021     Sjogren's syndrome (H)      Ulcerative colitis (H)      Ulcerative pancolitis (H)        Past Surgical History:  Past Surgical History:   Procedure Laterality Date     BENCH LIVER N/A 11/18/2021    Procedure: Bench liver;  Surgeon: Ernesto Schmitt MD;  Location:  OR     COMBINED ESOPHAGOSCOPY, GASTROSCOPY, DUODENOSCOPY (EGD), REMOVE BILIARY STENT N/A 2/9/2022    Procedure: ESOPHAGOGASTRODUODENOSCOPY, WITH BILIARY STENT REMOVAL;  Surgeon: Leventhal, Thomas Michael, MD;  [place] [facility]     CV CORONARY ANGIOGRAM N/A 8/25/2021    Procedure: Coronary Angiogram with possible intervention;  Surgeon: David Wilhelm MD;  Location:  HEART CARDIAC CATH LAB     ENDOSCOPIC RETROGRADE CHOLANGIOPANCREATOGRAM N/A 6/25/2021    Procedure: ENDOSCOPIC RETROGRADE CHOLANGIOPANCREATOGRAPHY with ballon sweep of bile ducts for stones, balloon dilation of bile ducts and bile duct stent placement;  Surgeon: Gregory Gabriel MD;  Location:  OR     ENDOSCOPIC RETROGRADE CHOLANGIOPANCREATOGRAM N/A 7/22/2021    Procedure: ENDOSCOPIC RETROGRADE CHOLANGIOPANCREATOGRAPHY STONE REMOVAL, DILATION AND STENT PLACEMENT;  Surgeon: Gregory Gabriel MD;  Location:  OR     ENDOSCOPIC RETROGRADE CHOLANGIOPANCREATOGRAPHY, EXCHANGE TUBE/STENT N/A 05/05/2021    Procedure: ENDOSCOPIC RETROGRADE CHOLANGIOPANCREATOGRAPHY WITH STENT EXCHANGE, STONE EXTRACTION, AND DILATION;   Surgeon: Gregory Gabriel MD;  Location: [facility] OR     ENDOSCOPIC RETROGRADE CHOLANGIOPANCREATOGRAPHY, EXCHANGE TUBE/STENT N/A 5/10/2021    Procedure: ENDOSCOPIC RETROGRADE CHOLANGIOPANCREATOGRAPHY with biliary dilation, stone removal, stent exchange;  Surgeon: Gregory Gabriel MD;  Location: [facility] OR     ENDOSCOPIC RETROGRADE CHOLANGIOPANCREATOGRAPHY, EXCHANGE TUBE/STENT N/A 6/10/2021    Procedure: ENDOSCOPIC RETROGRADE CHOLANGIOPANCREATOGRAPHY, WITH biliary stent exchange, stone removal;  Surgeon: Woodrow Lott MD;  Location: [facility] OR     ENDOSCOPIC RETROGRADE CHOLANGIOPANCREATOGRAPHY, EXCHANGE TUBE/STENT N/A 2021    Procedure: ENDOSCOPIC RETROGRADE CHOLANGIOPANCREATOGRAPHY with biliary dilation, stone removal, stent exchange;  Surgeon: Gregory Gabriel MD;  Location: [facility] OR     ENDOSCOPIC RETROGRADE CHOLANGIOPANCREATOGRAPHY, EXCHANGE TUBE/STENT N/A 10/1/2021    Procedure: ENDOSCOPIC RETROGRADE CHOLANGIOPANCREATOGRAPHY with balloon sweep of bile ducts, bile duct stent exchanged;  Surgeon: Gregory Gabriel MD;  Location: [facility] OR     ESOPHAGOSCOPY, GASTROSCOPY, DUODENOSCOPY (EGD), COMBINED N/A 2021    Procedure: ESOPHAGOGASTRODUODENOSCOPY (EGD);  Surgeon: Leventhal, Thomas Michael, MD;  Location: [facility] GI     ESOPHAGOSCOPY, GASTROSCOPY, DUODENOSCOPY (EGD), COMBINED N/A 10/1/2021    Procedure: ESOPHAGOGASTRODUODENOSCOPY (EGD) with varices banding;  Surgeon: Gregory Gabriel MD;  Location: [facility] OR     GYN SURGERY      bilat fallopian tubes and ovaries removed     PICC DOUBLE LUMEN PLACEMENT Right 2021    42cm (2cm external), Lateral brachial vein     RETURN LIVER TRANSPLANT N/A 2021    Procedure: CLOSURE, WOUND, ABDOMEN, SECONDARY, ABDOMINAL WASHOUT;  Surgeon: Ernesto Schmitt MD;  Location: [facility] OR     TRANSPLANT LIVER RECIPIENT  DONOR N/A 2021    Procedure: Opening of abdomen, Abdominal Exploration and aborted liver transplant.;  Surgeon: Ernesto Schmitt MD;  Location: [facility] OR     TRANSPLANT  "LIVER RECIPIENT  DONOR N/A 2021    Procedure: TRANSPLANT, LIVER, RECIPIENT,  DONOR;  Surgeon: Ernesto Schmitt MD;  Location:  OR       Past Social History:  Social History     Tobacco Use     Smoking status: Never Smoker     Smokeless tobacco: Never Used   Vaping Use     Vaping Use: Never used   Substance Use Topics     Alcohol use: No     Comment: Last drink was      Drug use: No        Family History:  Family History   Problem Relation Age of Onset     Cancer Mother         angiosarcoma     Coronary Artery Disease Early Onset Father         MI age 46     Heart Transplant Father      Liver Disease No family hx of      Ulcerative Colitis No family hx of      Crohn's Disease No family hx of        Review of Systems  A complete 10 point review of systems was asked and answered in the negative unless specifically commented upon in the HPI    Current Outpatient Medications   Medication     amLODIPine (NORVASC) 5 MG tablet     aspirin (ASA) 81 MG EC tablet     multivitamin (CENTRUM SILVER) tablet     pantoprazole (PROTONIX) 40 MG EC tablet     sulfamethoxazole-trimethoprim (BACTRIM) 400-80 MG tablet     tacrolimus (GENERIC EQUIVALENT) 1 MG capsule     tacrolimus (GENERIC EQUIVALENT) 5 MG capsule     ursodiol (ACTIGALL) 300 MG capsule     valGANciclovir (VALCYTE) 450 MG tablet     mycophenolate (GENERIC EQUIVALENT) 250 MG capsule     No current facility-administered medications for this visit.       Objective:         Vitals:    22 1257   BP: (!) 159/88   Pulse: 94   SpO2: 100%   Weight: 64.2 kg (141 lb 9.6 oz)   Height: 1.676 m (5' 6\")     Body mass index is 22.85 kg/m .     Physical Exam    Vitals reviewed.   Constitutional: Well-developed, well-nourished, in no apparent distress.    HEENT: Normocephalic. no scleral icterus.  Neck/Lymph: Normal ROM, supple. No thyromegaly.  No lymphadenopathy  Cardiac:  Regular rate and rhythm.  No overt murmurs  Respiratory: Clear to auscultation " bilaterally.  No wheezes or rales  GI:  Abdomen soft, non-distended, non-tender. BS present. Bilateral subcostal incision - well healed. no hernia  Skin:  Skin is warm and dry. No rash noted.  Peripheral Vascular: no lower extremity edema. 2+ pulses in all extremities  Musculoskeletal:  ROM intact, normal muscle bulk    Psychiatric: Normal mood and affect. Behavior is normal.  Neuro: no tremor, A&Ox3    Labs and Diagnostic tests:  CBC w/Diff    Lab Results   Component Value Date/Time    WBC 3.2 (L) 2022 08:55 AM    WBC 15.2 (H) 2021 09:09 AM    RBC 4.08 2022 08:55 AM    RBC 3.57 (L) 2021 09:09 AM    HGB 11.3 (L) 2022 08:55 AM    HGB 10.7 (L) 2021 09:09 AM    HCT 35.1 2022 08:55 AM    HCT 32.9 (L) 2021 09:09 AM    MCV 86 2022 08:55 AM    MCV 92 2021 09:09 AM    MCH 27.7 2022 08:55 AM    MCH 30.0 2021 09:09 AM    MCHC 32.2 2022 08:55 AM    MCHC 32.5 2021 09:09 AM    RDW 15.9 (H) 2022 08:55 AM    RDW 16.4 (H) 2021 09:09 AM         Comprehensive Metabolic Profile    Lab Results   Component Value Date/Time     2022 08:55 AM     (L) 2021 09:09 AM    CO2 22 2022 08:55 AM    CO2 21 2021 09:09 AM    BUN 28 2022 08:55 AM    BUN 23 2021 09:09 AM    CR 0.91 2022 08:55 AM    CR 0.69 2021 09:09 AM    Lab Results   Component Value Date/Time    ALBUMIN 3.9 2022 08:55 AM    ALBUMIN 2.8 (L) 2021 09:09 AM    ALKPHOS 89 2022 08:55 AM    ALKPHOS 386 (H) 2021 09:09 AM    AST 33 2022 08:55 AM    AST 58 (H) 2021 09:09 AM    ALT 85 (H) 2022 08:55 AM    ALT 32 2021 09:09 AM          Assessment and Plan:    S/P Liver Transplantation:   -She is status post  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  -Postoperative course has been relatively uneventful to date  -She has had excellent allograft function,  however, noted elevation in serum ALT this a.m. of unclear significance    -We will plan to repeat labs in 1 week  -Otherwise we will continue labs per protocol  -We will continue the ursodiol until we have stabilization of her liver tests.  No overt data to support ongoing use post transplant for patients with PSC  -She continues on aspirin 81 mg and will do so for 1 year post transplant    Immunosuppression:   -She is on a regimen of tacrolimus 6 mg twice daily with a resultant goal trough level of 5-6.  This has been changed given concerns for tremors in the early post transplant setting.  She was recently weaned off of MMF on March 5 and has been placed on prednisone 2.5 mg daily.    -We will await tacrolimus level    -We will not make further changes in her immunosuppression until we have repeat labs in 1 week  - Will plan to check immunosuppression trough levels per protocol to assess for adequate target dosing and will assess CBC and kidney function for toxicity of medications    Infectious Prophylaxis:   - PJP and CMV prophylaxis per protocol    Kidney Health:   -No acute issues at this time    Hypertension:  - On amlodipine 5mg daily; inadequate  - Will defer to PCP for escalation of BP meds with Goal BP </= 130/90    Pan ulcerative colitis:  -Noted that her last colonoscopy was in December 2020; she is overdue  -We will plan for exam in June 2022, when she is approximately 6 months post transplant for surveillance    Nutrition/Weight:    It is very common for patients to put on significant weight after liver transplantation, as they become conditioned to eating as much as possible to maintain a healthy weight pre-transplant.  There is a very significant risk of developing fatty liver and non-alcoholic steatohepatitis in post-transplant patients, even in those who were not transplanted for ALVES cirrhosis.  - Please work on eating a diet that is rice in fresh fruits and vegetables, moderate amounts of lean  protein and dairy, and modest amounts of carbohydrates and fats.  - An exercise plan/regimen is an essential part of remaining healthy in the post-transplant setting and we recommend 30-35 minutes of exercise at least 4 days a week    Routine Health Care:  - You need to establish and maintain care with a primary care physician to manage: hypertension, high cholesterol, abnormal blood sugars - as these are all potential complications of your health after liver transplantation and will need a local physician to manage these issues.  - All patients with liver diseases (even post transplant) are at an increased risk for osteoporosis.  We strongly recommend screening for Vitamin D deficiency at least twice yearly with aggressive supplementation/replacement as indicated.    - We recommend a screening DEXA scan to evaluate for osteoporosis.  If present, should treat with calcium, Vitamin D supplementation, and recommend consideration of bisphosphonate therapy.  Also recommend follow up DEXA scans to evaluate for improvement of bone density on therapy.  - Recommend yearly skin exams with dermatology, and if skin cancers are found, recommend at least twice yearly exams  - Recommend post transplant patients stay up to date for cancer screening: mammograms/PAP for women and prostate cancer screening for men, and colon cancer screening for all.  - Practice good hygiene with washing of hands and maintaining a clean living space as this will decrease your chances of developing an infection in the post-transplantation setting  - Eat a health diet: rich in fresh fruits and vegetables, lean proteins, and minimize carbohydrate and fat intake.  - Remain physically active: this is key to keeping the liver transplant healthy and patient's feeling strong and healthy    Follow Up:  6 months     Thank you very much for the opportunity to participate in the care of this patient.  If you have any further questions, please don't hesitate to  contact our office.    __________________________________  Thomas M. Leventhal, M.D.   of Medicine  Advanced & Transplant Hepatology  [facility]          Thomas M. Leventhal, MD

## 2022-03-21 NOTE — PROGRESS NOTES
Date of Service: 3/21/2022     Subjective:          Berta Nava is a 56 year old female presenting for follow up with history of liver transplantation    History of Present Illness   Berta Nava is a 56 year old female with past medical history of hypertension, microscopic colitis, ulcerative colitis, pancreatic mucinous cystadenoma, primary sclerosing cholangitis who presents with concerns of cirrhosis complicated by esophageal varices, ascites.  She is s/p  donor liver transplant on 2021 with reexploration abdominal washout and closure of abdomen on 2021.     Postoperative course has been relatively uncomplicated.  She did present for EGD for stent removal without difficulty.  She returned to work approximately 2 to 3 weeks ago, reports that it is going well.     With regard to her immunosuppression she is currently on a regimen of tacrolimus twice daily with a goal trough level of 5-6.  This was chosen secondary to significant tremors in December.  In the setting she was restarted on prednisone 2.5 mg daily in late February, shortly before she was weaned off of her twice daily MMF.    She has been having issues with hypertension since transplantation was recently started on amlodipine 5 mg daily.  Noted that she has persistent issues with hypertension and is seeing her primary doctor next week.    She reports that she does have some tremors and tingling of her extremities that come and go.  She notes that her appetite is improved, and she has put on some weight since her transplant.    She has many questions today regarding labs, for history of ulcerative colitis, and medications that she is taking.      Surgical Course  - OLT date: 2021  - etiology: PSC  - donor: type DBD  - CMV status D positive/R negative  - operation: Surgical technique caval replacement, biliary anastomosis: Duct to duct  - CiT 6 hrs 22 min, WiT 51 min  - Episodes of Rejection: none  -  Biliary complications: none; stent removed 2/9/2022  - Other complications of immediate post-operative course: none    Past Medical History:  Past Medical History:   Diagnosis Date     Ascites      Biliary cirrhosis (H)      Cholangitis, sclerosing      Cirrhosis of liver with ascites (H) 3/3/2021     Hearing loss of left ear     wears a hearing aide     History of low potassium      Hyperlipidemia      Hypertension      SBP (spontaneous bacterial peritonitis) (H) 4/30/2021     Sjogren's syndrome (H)      Ulcerative colitis (H)      Ulcerative pancolitis (H)        Past Surgical History:  Past Surgical History:   Procedure Laterality Date     BENCH LIVER N/A 11/18/2021    Procedure: Bench liver;  Surgeon: Ernesto Schmitt MD;  Location:  OR     COMBINED ESOPHAGOSCOPY, GASTROSCOPY, DUODENOSCOPY (EGD), REMOVE BILIARY STENT N/A 2/9/2022    Procedure: ESOPHAGOGASTRODUODENOSCOPY, WITH BILIARY STENT REMOVAL;  Surgeon: Leventhal, Thomas Michael, MD;  Location: [facility] OR     CV CORONARY ANGIOGRAM N/A 8/25/2021    Procedure: Coronary Angiogram with possible intervention;  Surgeon: David Wilhelm MD;  Location:  HEART CARDIAC CATH LAB     ENDOSCOPIC RETROGRADE CHOLANGIOPANCREATOGRAM N/A 6/25/2021    Procedure: ENDOSCOPIC RETROGRADE CHOLANGIOPANCREATOGRAPHY with ballon sweep of bile ducts for stones, balloon dilation of bile ducts and bile duct stent placement;  Surgeon: Gregory Gabriel MD;  Location:  OR     ENDOSCOPIC RETROGRADE CHOLANGIOPANCREATOGRAM N/A 7/22/2021    Procedure: ENDOSCOPIC RETROGRADE CHOLANGIOPANCREATOGRAPHY STONE REMOVAL, DILATION AND STENT PLACEMENT;  Surgeon: Gregory Gabriel MD;  Location:  OR     ENDOSCOPIC RETROGRADE CHOLANGIOPANCREATOGRAPHY, EXCHANGE TUBE/STENT N/A 05/05/2021    Procedure: ENDOSCOPIC RETROGRADE CHOLANGIOPANCREATOGRAPHY WITH STENT EXCHANGE, STONE EXTRACTION, AND DILATION;  Surgeon: Gregory Gabriel MD;  Location:  OR     ENDOSCOPIC RETROGRADE CHOLANGIOPANCREATOGRAPHY,  EXCHANGE TUBE/STENT N/A 5/10/2021    Procedure: ENDOSCOPIC RETROGRADE CHOLANGIOPANCREATOGRAPHY with biliary dilation, stone removal, stent exchange;  Surgeon: Gregory Gabriel MD;  Location: [facility] OR     ENDOSCOPIC RETROGRADE CHOLANGIOPANCREATOGRAPHY, EXCHANGE TUBE/STENT N/A 6/10/2021    Procedure: ENDOSCOPIC RETROGRADE CHOLANGIOPANCREATOGRAPHY, WITH biliary stent exchange, stone removal;  Surgeon: Woodrow Lott MD;  Location: [facility] OR     ENDOSCOPIC RETROGRADE CHOLANGIOPANCREATOGRAPHY, EXCHANGE TUBE/STENT N/A 2021    Procedure: ENDOSCOPIC RETROGRADE CHOLANGIOPANCREATOGRAPHY with biliary dilation, stone removal, stent exchange;  Surgeon: Gregory Gabriel MD;  Location: [facility] OR     ENDOSCOPIC RETROGRADE CHOLANGIOPANCREATOGRAPHY, EXCHANGE TUBE/STENT N/A 10/1/2021    Procedure: ENDOSCOPIC RETROGRADE CHOLANGIOPANCREATOGRAPHY with balloon sweep of bile ducts, bile duct stent exchanged;  Surgeon: Gregory Gabriel MD;  Location: [facility] OR     ESOPHAGOSCOPY, GASTROSCOPY, DUODENOSCOPY (EGD), COMBINED N/A 2021    Procedure: ESOPHAGOGASTRODUODENOSCOPY (EGD);  Surgeon: Leventhal, Thomas Michael, MD;  Location: [facility] GI     ESOPHAGOSCOPY, GASTROSCOPY, DUODENOSCOPY (EGD), COMBINED N/A 10/1/2021    Procedure: ESOPHAGOGASTRODUODENOSCOPY (EGD) with varices banding;  Surgeon: Gregory Gabriel MD;  Location: [facility] OR     GYN SURGERY      bilat fallopian tubes and ovaries removed     PICC DOUBLE LUMEN PLACEMENT Right 2021    42cm (2cm external), Lateral brachial vein     RETURN LIVER TRANSPLANT N/A 2021    Procedure: CLOSURE, WOUND, ABDOMEN, SECONDARY, ABDOMINAL WASHOUT;  Surgeon: Ernesto Schmitt MD;  Location: [facility] OR     TRANSPLANT LIVER RECIPIENT  DONOR N/A 2021    Procedure: Opening of abdomen, Abdominal Exploration and aborted liver transplant.;  Surgeon: Ernesto Schmitt MD;  Location: [facility] OR     TRANSPLANT LIVER RECIPIENT  DONOR N/A 2021    Procedure: TRANSPLANT, LIVER, RECIPIENT,   "DONOR;  Surgeon: Ernesto Schmitt MD;  Location:  OR       Past Social History:  Social History     Tobacco Use     Smoking status: Never Smoker     Smokeless tobacco: Never Used   Vaping Use     Vaping Use: Never used   Substance Use Topics     Alcohol use: No     Comment: Last drink was 2017     Drug use: No        Family History:  Family History   Problem Relation Age of Onset     Cancer Mother         angiosarcoma     Coronary Artery Disease Early Onset Father         MI age 46     Heart Transplant Father      Liver Disease No family hx of      Ulcerative Colitis No family hx of      Crohn's Disease No family hx of        Review of Systems  A complete 10 point review of systems was asked and answered in the negative unless specifically commented upon in the HPI    Current Outpatient Medications   Medication     amLODIPine (NORVASC) 5 MG tablet     aspirin (ASA) 81 MG EC tablet     multivitamin (CENTRUM SILVER) tablet     pantoprazole (PROTONIX) 40 MG EC tablet     sulfamethoxazole-trimethoprim (BACTRIM) 400-80 MG tablet     tacrolimus (GENERIC EQUIVALENT) 1 MG capsule     tacrolimus (GENERIC EQUIVALENT) 5 MG capsule     ursodiol (ACTIGALL) 300 MG capsule     valGANciclovir (VALCYTE) 450 MG tablet     mycophenolate (GENERIC EQUIVALENT) 250 MG capsule     No current facility-administered medications for this visit.       Objective:         Vitals:    03/21/22 1257   BP: (!) 159/88   Pulse: 94   SpO2: 100%   Weight: 64.2 kg (141 lb 9.6 oz)   Height: 1.676 m (5' 6\")     Body mass index is 22.85 kg/m .     Physical Exam    Vitals reviewed.   Constitutional: Well-developed, well-nourished, in no apparent distress.    HEENT: Normocephalic. no scleral icterus.  Neck/Lymph: Normal ROM, supple. No thyromegaly.  No lymphadenopathy  Cardiac:  Regular rate and rhythm.  No overt murmurs  Respiratory: Clear to auscultation bilaterally.  No wheezes or rales  GI:  Abdomen soft, non-distended, non-tender. BS present. " Bilateral subcostal incision - well healed. no hernia  Skin:  Skin is warm and dry. No rash noted.  Peripheral Vascular: no lower extremity edema. 2+ pulses in all extremities  Musculoskeletal:  ROM intact, normal muscle bulk    Psychiatric: Normal mood and affect. Behavior is normal.  Neuro: no tremor, A&Ox3    Labs and Diagnostic tests:  CBC w/Diff    Lab Results   Component Value Date/Time    WBC 3.2 (L) 2022 08:55 AM    WBC 15.2 (H) 2021 09:09 AM    RBC 4.08 2022 08:55 AM    RBC 3.57 (L) 2021 09:09 AM    HGB 11.3 (L) 2022 08:55 AM    HGB 10.7 (L) 2021 09:09 AM    HCT 35.1 2022 08:55 AM    HCT 32.9 (L) 2021 09:09 AM    MCV 86 2022 08:55 AM    MCV 92 2021 09:09 AM    MCH 27.7 2022 08:55 AM    MCH 30.0 2021 09:09 AM    MCHC 32.2 2022 08:55 AM    MCHC 32.5 2021 09:09 AM    RDW 15.9 (H) 2022 08:55 AM    RDW 16.4 (H) 2021 09:09 AM         Comprehensive Metabolic Profile    Lab Results   Component Value Date/Time     2022 08:55 AM     (L) 2021 09:09 AM    CO2 22 2022 08:55 AM    CO2 21 2021 09:09 AM    BUN 28 2022 08:55 AM    BUN 23 2021 09:09 AM    CR 0.91 2022 08:55 AM    CR 0.69 2021 09:09 AM    Lab Results   Component Value Date/Time    ALBUMIN 3.9 2022 08:55 AM    ALBUMIN 2.8 (L) 2021 09:09 AM    ALKPHOS 89 2022 08:55 AM    ALKPHOS 386 (H) 2021 09:09 AM    AST 33 2022 08:55 AM    AST 58 (H) 2021 09:09 AM    ALT 85 (H) 2022 08:55 AM    ALT 32 2021 09:09 AM          Assessment and Plan:    S/P Liver Transplantation:   -She is status post  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  -Postoperative course has been relatively uneventful to date  -She has had excellent allograft function, however, noted elevation in serum ALT this a.m. of unclear significance    -We will plan to repeat  labs in 1 week  -Otherwise we will continue labs per protocol  -We will continue the ursodiol until we have stabilization of her liver tests.  No overt data to support ongoing use post transplant for patients with PSC  -She continues on aspirin 81 mg and will do so for 1 year post transplant    Immunosuppression:   -She is on a regimen of tacrolimus 6 mg twice daily with a resultant goal trough level of 5-6.  This has been changed given concerns for tremors in the early post transplant setting.  She was recently weaned off of MMF on March 5 and has been placed on prednisone 2.5 mg daily.    -We will await tacrolimus level    -We will not make further changes in her immunosuppression until we have repeat labs in 1 week  - Will plan to check immunosuppression trough levels per protocol to assess for adequate target dosing and will assess CBC and kidney function for toxicity of medications    Infectious Prophylaxis:   - PJP and CMV prophylaxis per protocol    Kidney Health:   -No acute issues at this time    Hypertension:  - On amlodipine 5mg daily; inadequate  - Will defer to PCP for escalation of BP meds with Goal BP </= 130/90    Pan ulcerative colitis:  -Noted that her last colonoscopy was in December 2020; she is overdue  -We will plan for exam in June 2022, when she is approximately 6 months post transplant for surveillance    Nutrition/Weight:    It is very common for patients to put on significant weight after liver transplantation, as they become conditioned to eating as much as possible to maintain a healthy weight pre-transplant.  There is a very significant risk of developing fatty liver and non-alcoholic steatohepatitis in post-transplant patients, even in those who were not transplanted for ALVES cirrhosis.  - Please work on eating a diet that is rice in fresh fruits and vegetables, moderate amounts of lean protein and dairy, and modest amounts of carbohydrates and fats.  - An exercise plan/regimen is an  essential part of remaining healthy in the post-transplant setting and we recommend 30-35 minutes of exercise at least 4 days a week    Routine Health Care:  - You need to establish and maintain care with a primary care physician to manage: hypertension, high cholesterol, abnormal blood sugars - as these are all potential complications of your health after liver transplantation and will need a local physician to manage these issues.  - All patients with liver diseases (even post transplant) are at an increased risk for osteoporosis.  We strongly recommend screening for Vitamin D deficiency at least twice yearly with aggressive supplementation/replacement as indicated.    - We recommend a screening DEXA scan to evaluate for osteoporosis.  If present, should treat with calcium, Vitamin D supplementation, and recommend consideration of bisphosphonate therapy.  Also recommend follow up DEXA scans to evaluate for improvement of bone density on therapy.  - Recommend yearly skin exams with dermatology, and if skin cancers are found, recommend at least twice yearly exams  - Recommend post transplant patients stay up to date for cancer screening: mammograms/PAP for women and prostate cancer screening for men, and colon cancer screening for all.  - Practice good hygiene with washing of hands and maintaining a clean living space as this will decrease your chances of developing an infection in the post-transplantation setting  - Eat a health diet: rich in fresh fruits and vegetables, lean proteins, and minimize carbohydrate and fat intake.  - Remain physically active: this is key to keeping the liver transplant healthy and patient's feeling strong and healthy    Follow Up:  6 months     Thank you very much for the opportunity to participate in the care of this patient.  If you have any further questions, please don't hesitate to contact our office.    __________________________________  Thomas M. Leventhal, M.D.  Assistant  Professor of Medicine  Advanced & Transplant Hepatology  [facility]

## 2022-03-22 DIAGNOSIS — Z94.4 STATUS POST LIVER TRANSPLANTATION (H): ICD-10-CM

## 2022-03-22 RX ORDER — TACROLIMUS 1 MG/1
1 CAPSULE ORAL EVERY MORNING
Qty: 90 CAPSULE | Refills: 3 | Status: SHIPPED | OUTPATIENT
Start: 2022-03-22 | End: 2022-05-03 | Stop reason: DRUGHIGH

## 2022-03-22 RX ORDER — TACROLIMUS 5 MG/1
5 CAPSULE ORAL EVERY 12 HOURS
Qty: 180 CAPSULE | Refills: 3 | Status: SHIPPED | OUTPATIENT
Start: 2022-03-22 | End: 2022-05-03

## 2022-03-22 NOTE — TELEPHONE ENCOUNTER
ISSUE:   Tacrolimus IR level 8.8 on 3/21/22, goal 5-6, dose 6 mg BID.    PLAN:   Please call patient and confirm this was an accurate 12-hour trough. Verify Tacrolimus IR dose 6 mg BID. Confirm no new medications or illness. Confirm no missed doses. If accurate trough and accurate dose, decrease Tacrolimus IR dose to 6 mg AM and 5 mg PM and repeat labs in 1 week.    OUTCOME:   Spoke with patient, they confirm accurate trough level and current dose 6 mg BID. Patient confirmed dose change to 6 mg am, 5 mg pm and to repeat labs in 1 weeks. Orders sent to preferred pharmacy for dose change and lab for repeat labs. Patient voiced understanding of plan.    Anne Marie Miranda LPN

## 2022-03-23 ENCOUNTER — TELEPHONE (OUTPATIENT)
Dept: GASTROENTEROLOGY | Facility: CLINIC | Age: 57
End: 2022-03-23
Payer: COMMERCIAL

## 2022-03-23 NOTE — TELEPHONE ENCOUNTER
Screening Questions  BlueKIND OF PREP RedLOCATION [review exclusion criteria] GreenSEDATION TYPE  1. Have you had a positive covid test in the last 90 days? N     2. Are you active on mychart? Y    3. What insurance is in the chart? [employer]     3.   Ordering/Referring Provider: LEVENTHAL    4. BMI 22.6 [BMI OVER 40-EXTENDED PREP]  If greater than 40 review exclusion criteria [PAC APPT IF @ [facility]]        5.  Respiratory Screening :  [If yes to any of the following HOSPITAL setting only]     Do you use daily home oxygen? N    Do you have mod to severe Obstructive Sleep Apnea? N  [OKAY @ [facility] SH PH RI]   Do you have Pulmonary Hypertension? N     Do you have UNCONTROLLED asthma? N        6.   Have you had a heart or lung transplant? N      7.   Are you currently on dialysis? N [ If yes, G-PREP & HOSPITAL setting only]     8.   Do you have chronic kidney disease? N [ If yes, G-PREP ]    9.   Have you had a stroke or Transient ischemic attack (TIA - aka  mini stroke ) within 6 months?  N (If yes, please review exclusion criteria)    10.   In the past 6 months, have you had any heart related issues including cardiomyopathy or heart attack? N           If yes, did it require cardiac stenting or other implantable device?       11.   Do you have any implantable devices in your body (pacemaker, defib, LVAD)? N (If yes, please review exclusion criteria)    12.   Do you take nitroglycerin? N           If yes, how often?   (if yes, HOSPITAL setting ONLY)    13.   Are you currently taking any blood thinners? N           [IF YES, INFORM PATIENT TO FOLLOW UP W/ ORDERING PROVIDER FOR BRIDGING INSTRUCTIONS]     14.   Do you have a diagnosis of diabetes? N   [ If yes, G-PREP ]    15.   [FEMALES] Are you currently pregnant?     If yes, how many weeks?     16.   Are you taking any prescription pain medications on a routine schedule?  N  [ If yes, EXTENDED PREP.] [If yes, MAC]    17.   Do you have any chemical dependencies  such as alcohol, street drugs, or methadone?  N [If yes, MAC]    18.   Do you have any history of post-traumatic stress syndrome, severe anxiety or history of psychosis?  N  [If yes, MAC]    19.   Do you have a significant intellectual disability?  N [If yes, MAC]    20.   Do you transfer independently?  Y    21.  On a regular basis do you go 3-5 days between bowel movements? N   [ If yes, EXTENDED PREP.]    22.   Preferred LOCAL Pharmacy for Pre Prescription        [id number] - [place], [place] - [address] [place] [address] [address] AT [facility] [address]      Scheduling Details      Caller : Berta Nava    (Please ask for phone number if not scheduled by patient)    Type of Procedure Scheduled: COLON  Which Colonoscopy Prep was Sent?: PASCALE STEVENS CF PATIENTS & GROEN'S PATIENTS NEEDS EXTENDED PREP  Surgeon: LEVENTHAL  Date of Procedure: 6/8/22  [place] [facility]      Sedation Type: CS  Conscious Sedation- Needs  for 6 hours after the procedure  MAC/General-Needs  for 24 hours after procedure    Pre-op Required at [facility] [place], [place] and OR for MAC sedation:   (advise patient they will need a pre-op prior to procedure -)      Informed patient they will need an adult  Y  Cannot take any type of public or medical transportation alone    Pre-Procedure Covid test to be completed at [facility] or Externally: 6/4/22 @    Confirmed Nurse will call to complete assessment Y    Additional comments:

## 2022-03-25 ENCOUNTER — TELEPHONE (OUTPATIENT)
Dept: TRANSPLANT | Facility: CLINIC | Age: 57
End: 2022-03-25
Payer: COMMERCIAL

## 2022-03-25 DIAGNOSIS — K83.01 PRIMARY SCLEROSING CHOLANGITIS (H): Primary | ICD-10-CM

## 2022-03-25 RX ORDER — PREDNISONE 5 MG/1
2.5 TABLET ORAL DAILY
Qty: 45 TABLET | Refills: 3 | Status: SHIPPED | OUTPATIENT
Start: 2022-03-25 | End: 2022-03-29

## 2022-03-25 NOTE — TELEPHONE ENCOUNTER
Call to Berta to check in.  She is worried about her labs but will repeat them on Monday.  Will review IS w/ Dr. Leventhal after we get labs back.  Berta confirms that she is off cellcept (weaned off at 3 mos per protocol) and on prednisone 2.5 mg po daily (PSC) and tacrolimus w/ goal of 6-8..

## 2022-03-28 ENCOUNTER — LAB (OUTPATIENT)
Dept: LAB | Facility: CLINIC | Age: 57
End: 2022-03-28
Payer: COMMERCIAL

## 2022-03-28 DIAGNOSIS — Z94.4 S/P LIVER TRANSPLANT (H): ICD-10-CM

## 2022-03-28 PROCEDURE — 80076 HEPATIC FUNCTION PANEL: CPT

## 2022-03-28 PROCEDURE — 36415 COLL VENOUS BLD VENIPUNCTURE: CPT

## 2022-03-29 DIAGNOSIS — K83.01 PRIMARY SCLEROSING CHOLANGITIS (H): ICD-10-CM

## 2022-03-29 LAB
ALBUMIN SERPL-MCNC: 3.8 G/DL (ref 3.4–5)
ALP SERPL-CCNC: 95 U/L (ref 40–150)
ALT SERPL W P-5'-P-CCNC: 88 U/L (ref 0–50)
AST SERPL W P-5'-P-CCNC: 45 U/L (ref 0–45)
BILIRUB DIRECT SERPL-MCNC: 0.1 MG/DL (ref 0–0.2)
BILIRUB SERPL-MCNC: 0.6 MG/DL (ref 0.2–1.3)
PROT SERPL-MCNC: 7.4 G/DL (ref 6.8–8.8)

## 2022-03-29 RX ORDER — PREDNISONE 5 MG/1
5 TABLET ORAL DAILY
Qty: 90 TABLET | Refills: 3 | Status: SHIPPED | OUTPATIENT
Start: 2022-03-29 | End: 2022-06-23

## 2022-03-29 NOTE — TELEPHONE ENCOUNTER
Message from Dr. Leventhal:    Leventhal, Thomas Michael, MD sent to Vanessa Durant RN  Tac goal 6-8   Pred 5 (this is NOT for PSC, but rather IS reasons...)

## 2022-03-30 ENCOUNTER — OFFICE VISIT (OUTPATIENT)
Dept: INTERNAL MEDICINE | Facility: CLINIC | Age: 57
End: 2022-03-30
Payer: COMMERCIAL

## 2022-03-30 VITALS
RESPIRATION RATE: 16 BRPM | TEMPERATURE: 97.5 F | BODY MASS INDEX: 23.25 KG/M2 | SYSTOLIC BLOOD PRESSURE: 114 MMHG | DIASTOLIC BLOOD PRESSURE: 76 MMHG | WEIGHT: 144.7 LBS | HEART RATE: 114 BPM | OXYGEN SATURATION: 99 % | HEIGHT: 66 IN

## 2022-03-30 DIAGNOSIS — I10 ESSENTIAL HYPERTENSION: ICD-10-CM

## 2022-03-30 PROCEDURE — 99213 OFFICE O/P EST LOW 20 MIN: CPT | Performed by: NURSE PRACTITIONER

## 2022-03-30 RX ORDER — AMLODIPINE BESYLATE 5 MG/1
10 TABLET ORAL DAILY
Qty: 90 TABLET | Refills: 3 | Status: SHIPPED | OUTPATIENT
Start: 2022-03-30 | End: 2022-03-31

## 2022-03-30 RX ORDER — AMLODIPINE BESYLATE 5 MG/1
10 TABLET ORAL DAILY
Qty: 90 TABLET | Refills: 3 | Status: SHIPPED | OUTPATIENT
Start: 2022-03-30 | End: 2022-03-30

## 2022-03-30 NOTE — PROGRESS NOTES
"  Assessment & Plan     Essential hypertension    - amLODIPine (NORVASC) 5 MG tablet; Take 2 tablets (10 mg) by mouth daily    F/u BP  3 months             Tila Nevarez NP  [facility] [place] is a 56 year old who presents for the following health issues     History of Present Illness       Hypertension: She presents for follow up of hypertension.  She does check blood pressure  regularly outside of the clinic. Outside blood pressures have been over 140/90. She follows a low salt diet.     She eats 2-3 servings of fruits and vegetables daily.She consumes 0 sweetened beverage(s) daily.She exercises with enough effort to increase her heart rate 10 to 19 minutes per day.  She exercises with enough effort to increase her heart rate 4 days per week.   She is taking medications regularly.       Hypertension Follow-up      Do you check your blood pressure regularly outside of the clinic? Yes     Are you following a low salt diet? No    Are your blood pressures ever more than 140 on the top number (systolic) OR more   than 90 on the bottom number (diastolic), for example 140/90? Yes  Increased norvasc to 10 mg on transplant providers recommendation a few days ago.      Review of Systems   Constitutional, HEENT, cardiovascular, pulmonary, gi and gu systems are negative, except as otherwise noted.      Objective    /76   Pulse 114   Temp 97.5  F (36.4  C) (Tympanic)   Resp 16   Ht 1.676 m (5' 6\")   Wt 65.6 kg (144 lb 11.2 oz)   LMP  (LMP Unknown)   SpO2 99%   BMI 23.36 kg/m    Body mass index is 23.36 kg/m .  Physical Exam   GENERAL: healthy, alert and no distress  RESP: lungs clear to auscultation - no rales, rhonchi or wheezes  CV: regular rate and rhythm, normal S1 S2, no S3 or S4, no murmur, click or rub, no peripheral edema and peripheral pulses strong                "

## 2022-04-01 DIAGNOSIS — I10 ESSENTIAL HYPERTENSION: ICD-10-CM

## 2022-04-01 RX ORDER — AMLODIPINE BESYLATE 5 MG/1
TABLET ORAL
Qty: 180 TABLET | Refills: 3 | OUTPATIENT
Start: 2022-04-01

## 2022-04-01 NOTE — TELEPHONE ENCOUNTER
Refused Prescriptions:                       Disp   Refills    amLODIPine (NORVASC) 5 MG tablet [Pharmacy*180 ta*3        Sig: TAKE 2 TABLETS(10 MG) BY MOUTH DAILY      180 tablets with 3 refills sent on 3/31/2022      Anne Marie HELLER RN   [facility]

## 2022-04-04 ENCOUNTER — LAB (OUTPATIENT)
Dept: LAB | Facility: CLINIC | Age: 57
End: 2022-04-04
Payer: COMMERCIAL

## 2022-04-04 DIAGNOSIS — Z94.4 LIVER REPLACED BY TRANSPLANT (H): ICD-10-CM

## 2022-04-04 LAB
ALBUMIN SERPL-MCNC: 3.9 G/DL (ref 3.4–5)
ALP SERPL-CCNC: 94 U/L (ref 40–150)
ALT SERPL W P-5'-P-CCNC: 85 U/L (ref 0–50)
ANION GAP SERPL CALCULATED.3IONS-SCNC: 6 MMOL/L (ref 3–14)
AST SERPL W P-5'-P-CCNC: 30 U/L (ref 0–45)
BILIRUB DIRECT SERPL-MCNC: 0.1 MG/DL (ref 0–0.2)
BILIRUB SERPL-MCNC: 0.5 MG/DL (ref 0.2–1.3)
BUN SERPL-MCNC: 21 MG/DL (ref 7–30)
CALCIUM SERPL-MCNC: 9.6 MG/DL (ref 8.5–10.1)
CHLORIDE BLD-SCNC: 108 MMOL/L (ref 94–109)
CO2 SERPL-SCNC: 25 MMOL/L (ref 20–32)
CREAT SERPL-MCNC: 0.85 MG/DL (ref 0.52–1.04)
ERYTHROCYTE [DISTWIDTH] IN BLOOD BY AUTOMATED COUNT: 16.2 % (ref 10–15)
GFR SERPL CREATININE-BSD FRML MDRD: 79 ML/MIN/1.73M2
GLUCOSE BLD-MCNC: 87 MG/DL (ref 70–99)
HCT VFR BLD AUTO: 35 % (ref 35–47)
HGB BLD-MCNC: 11.5 G/DL (ref 11.7–15.7)
MAGNESIUM SERPL-MCNC: 1.8 MG/DL (ref 1.6–2.3)
MCH RBC QN AUTO: 28.2 PG (ref 26.5–33)
MCHC RBC AUTO-ENTMCNC: 32.9 G/DL (ref 31.5–36.5)
MCV RBC AUTO: 86 FL (ref 78–100)
PHOSPHATE SERPL-MCNC: 4.2 MG/DL (ref 2.5–4.5)
PLATELET # BLD AUTO: 157 10E3/UL (ref 150–450)
POTASSIUM BLD-SCNC: 3.7 MMOL/L (ref 3.4–5.3)
PROT SERPL-MCNC: 7.6 G/DL (ref 6.8–8.8)
RBC # BLD AUTO: 4.08 10E6/UL (ref 3.8–5.2)
SODIUM SERPL-SCNC: 139 MMOL/L (ref 133–144)
TACROLIMUS BLD-MCNC: 8.1 UG/L (ref 5–15)
TME LAST DOSE: NORMAL H
TME LAST DOSE: NORMAL H
WBC # BLD AUTO: 3.6 10E3/UL (ref 4–11)

## 2022-04-04 PROCEDURE — 83735 ASSAY OF MAGNESIUM: CPT

## 2022-04-04 PROCEDURE — 36415 COLL VENOUS BLD VENIPUNCTURE: CPT

## 2022-04-04 PROCEDURE — 82248 BILIRUBIN DIRECT: CPT

## 2022-04-04 PROCEDURE — 85027 COMPLETE CBC AUTOMATED: CPT

## 2022-04-04 PROCEDURE — 84100 ASSAY OF PHOSPHORUS: CPT

## 2022-04-04 PROCEDURE — 80197 ASSAY OF TACROLIMUS: CPT

## 2022-04-04 PROCEDURE — 80053 COMPREHEN METABOLIC PANEL: CPT

## 2022-04-06 ENCOUNTER — TELEPHONE (OUTPATIENT)
Dept: TRANSPLANT | Facility: CLINIC | Age: 57
End: 2022-04-06
Payer: COMMERCIAL

## 2022-04-06 DIAGNOSIS — R79.89 ABNORMAL LIVER FUNCTION TESTS: Primary | ICD-10-CM

## 2022-04-06 NOTE — TELEPHONE ENCOUNTER
ALT slightly elevated.  Per dr. Leventhal:    Leventhal, Thomas Michael, MD sent to Vanessa Durant RN  Lets start with Abd US with doppler now   If normal, will need a liver biopsy     TL     Order placed, Re aware to call and schedule.

## 2022-04-07 ENCOUNTER — ANCILLARY PROCEDURE (OUTPATIENT)
Dept: ULTRASOUND IMAGING | Facility: CLINIC | Age: 57
End: 2022-04-07
Attending: INTERNAL MEDICINE
Payer: COMMERCIAL

## 2022-04-07 DIAGNOSIS — R79.89 ABNORMAL LIVER FUNCTION TESTS: ICD-10-CM

## 2022-04-07 PROCEDURE — 93975 VASCULAR STUDY: CPT | Mod: GC | Performed by: RADIOLOGY

## 2022-04-07 NOTE — PROGRESS NOTES
This is a recent snapshot of the patient's [facility] medical record.  For current drug dose and complete information and questions, call [phone] or In Basket pool, fv home infusion ([id number])  CSN Number:  [id number]

## 2022-04-11 DIAGNOSIS — K83.01 PSC (PRIMARY SCLEROSING CHOLANGITIS) (H): ICD-10-CM

## 2022-04-11 DIAGNOSIS — Z94.4 S/P LIVER TRANSPLANT (H): ICD-10-CM

## 2022-04-11 DIAGNOSIS — R79.89 ABNORMAL LIVER FUNCTION TESTS: Primary | ICD-10-CM

## 2022-04-11 NOTE — PROGRESS NOTES
Per Dr Leventhal,     MRI w/wo contrast liver protocol (eval PV for thrombus/stenosis)   - MRCP (eval the duct)    Spoke with naida to inform of the imaging recommendations and she will contact to schedule MRI and MRCP.

## 2022-04-14 ENCOUNTER — TELEPHONE (OUTPATIENT)
Dept: INTERNAL MEDICINE | Facility: CLINIC | Age: 57
End: 2022-04-14

## 2022-04-14 NOTE — TELEPHONE ENCOUNTER
Patient calls to request orders for the lab to do a COVID-19 test on 04/18 with her monthly lab work for her MRI on 04/21.

## 2022-04-18 ENCOUNTER — LAB (OUTPATIENT)
Dept: LAB | Facility: CLINIC | Age: 57
End: 2022-04-18
Payer: COMMERCIAL

## 2022-04-18 DIAGNOSIS — Z94.4 LIVER REPLACED BY TRANSPLANT (H): ICD-10-CM

## 2022-04-18 LAB
ERYTHROCYTE [DISTWIDTH] IN BLOOD BY AUTOMATED COUNT: 16.3 % (ref 10–15)
HCT VFR BLD AUTO: 34.5 % (ref 35–47)
HGB BLD-MCNC: 11.7 G/DL (ref 11.7–15.7)
MCH RBC QN AUTO: 29 PG (ref 26.5–33)
MCHC RBC AUTO-ENTMCNC: 33.9 G/DL (ref 31.5–36.5)
MCV RBC AUTO: 86 FL (ref 78–100)
PLATELET # BLD AUTO: 159 10E3/UL (ref 150–450)
RBC # BLD AUTO: 4.03 10E6/UL (ref 3.8–5.2)
TACROLIMUS BLD-MCNC: 8.9 UG/L (ref 5–15)
TME LAST DOSE: NORMAL H
TME LAST DOSE: NORMAL H
WBC # BLD AUTO: 3.6 10E3/UL (ref 4–11)

## 2022-04-18 PROCEDURE — 84100 ASSAY OF PHOSPHORUS: CPT

## 2022-04-18 PROCEDURE — 80197 ASSAY OF TACROLIMUS: CPT

## 2022-04-18 PROCEDURE — 36415 COLL VENOUS BLD VENIPUNCTURE: CPT

## 2022-04-18 PROCEDURE — 80053 COMPREHEN METABOLIC PANEL: CPT

## 2022-04-18 PROCEDURE — 83735 ASSAY OF MAGNESIUM: CPT

## 2022-04-18 PROCEDURE — 82248 BILIRUBIN DIRECT: CPT

## 2022-04-18 PROCEDURE — 85027 COMPLETE CBC AUTOMATED: CPT

## 2022-04-19 LAB
ALBUMIN SERPL-MCNC: 4.3 G/DL (ref 3.4–5)
ALP SERPL-CCNC: 91 U/L (ref 40–150)
ALT SERPL W P-5'-P-CCNC: 74 U/L (ref 0–50)
ANION GAP SERPL CALCULATED.3IONS-SCNC: 7 MMOL/L (ref 3–14)
AST SERPL W P-5'-P-CCNC: 31 U/L (ref 0–45)
BILIRUB DIRECT SERPL-MCNC: 0.1 MG/DL (ref 0–0.2)
BILIRUB SERPL-MCNC: 0.5 MG/DL (ref 0.2–1.3)
BUN SERPL-MCNC: 24 MG/DL (ref 7–30)
CALCIUM SERPL-MCNC: 9.1 MG/DL (ref 8.5–10.1)
CHLORIDE BLD-SCNC: 110 MMOL/L (ref 94–109)
CO2 SERPL-SCNC: 22 MMOL/L (ref 20–32)
CREAT SERPL-MCNC: 0.8 MG/DL (ref 0.52–1.04)
GFR SERPL CREATININE-BSD FRML MDRD: 85 ML/MIN/1.73M2
GLUCOSE BLD-MCNC: 82 MG/DL (ref 70–99)
MAGNESIUM SERPL-MCNC: 1.7 MG/DL (ref 1.6–2.3)
PHOSPHATE SERPL-MCNC: 4.1 MG/DL (ref 2.5–4.5)
POTASSIUM BLD-SCNC: 4 MMOL/L (ref 3.4–5.3)
PROT SERPL-MCNC: 7.4 G/DL (ref 6.8–8.8)
SODIUM SERPL-SCNC: 139 MMOL/L (ref 133–144)

## 2022-04-20 NOTE — PROGRESS NOTES
This is a recent snapshot of the patient's [facility] medical record.  For current drug dose and complete information and questions, call [phone] or In [facility] pool, fv home infusion ([id number])  CSN Number:  [id number]

## 2022-04-21 ENCOUNTER — TELEPHONE (OUTPATIENT)
Dept: TRANSPLANT | Facility: CLINIC | Age: 57
End: 2022-04-21

## 2022-04-21 ENCOUNTER — HOSPITAL ENCOUNTER (OUTPATIENT)
Dept: MRI IMAGING | Facility: CLINIC | Age: 57
Discharge: HOME | End: 2022-04-21
Attending: INTERNAL MEDICINE | Admitting: INTERNAL MEDICINE
Payer: COMMERCIAL

## 2022-04-21 DIAGNOSIS — K83.01 PSC (PRIMARY SCLEROSING CHOLANGITIS) (H): ICD-10-CM

## 2022-04-21 DIAGNOSIS — Z94.4 STATUS POST LIVER TRANSPLANTATION (H): Primary | ICD-10-CM

## 2022-04-21 DIAGNOSIS — R79.89 ABNORMAL LIVER FUNCTION TESTS: ICD-10-CM

## 2022-04-21 DIAGNOSIS — Z94.4 S/P LIVER TRANSPLANT (H): ICD-10-CM

## 2022-04-21 PROCEDURE — 74183 MRI ABD W/O CNTR FLWD CNTR: CPT

## 2022-04-21 PROCEDURE — 255N000002 HC RX 255 OP 636: Performed by: INTERNAL MEDICINE

## 2022-04-21 PROCEDURE — 74183 MRI ABD W/O CNTR FLWD CNTR: CPT | Mod: 26 | Performed by: RADIOLOGY

## 2022-04-21 PROCEDURE — A9585 GADOBUTROL INJECTION: HCPCS | Performed by: INTERNAL MEDICINE

## 2022-04-21 RX ORDER — VALGANCICLOVIR 450 MG/1
450 TABLET, FILM COATED ORAL DAILY
Qty: 90 TABLET | Refills: 3 | COMMUNITY
Start: 2022-04-21 | End: 2022-05-24

## 2022-04-21 RX ORDER — GADOBUTROL 604.72 MG/ML
7 INJECTION INTRAVENOUS ONCE
Status: COMPLETED | OUTPATIENT
Start: 2022-04-21 | End: 2022-04-21

## 2022-04-21 RX ORDER — SULFAMETHOXAZOLE AND TRIMETHOPRIM 400; 80 MG/1; MG/1
1 TABLET ORAL DAILY
Qty: 90 TABLET | Refills: 3 | COMMUNITY
Start: 2022-04-27 | End: 2022-05-24

## 2022-04-21 RX ADMIN — GADOBUTROL 7 ML: 604.72 INJECTION INTRAVENOUS at 13:26

## 2022-04-21 NOTE — TELEPHONE ENCOUNTER
Per Leventhal:      Leventhal, Thomas Michael, MD sent to Vanessa Durant, RN  - MRI looks pretty normal   - Noted that ALT is slowly coming down (certainly not increasing)   - We'll stay conservative and watch   TL     Spoke to Berta.  She will repeat labs every 2 weeks for a bit.  We'll touch base in a month and rediscuss lab frequency.     She is due to stop valcyte and bactrim on 5/18.  She has a week's worth of these  left.  I told her she can stop when she runs out.  She has no GI symptoms, was not on protonix prior to transplant.  Asked if she could stop this, told her yes, to try tums / pepcid / zantac if she feels like she needs something intermittently or call back if she thinks she needs to restart protonix.

## 2022-05-02 ENCOUNTER — LAB (OUTPATIENT)
Dept: LAB | Facility: CLINIC | Age: 57
End: 2022-05-02
Payer: COMMERCIAL

## 2022-05-02 DIAGNOSIS — Z94.4 LIVER REPLACED BY TRANSPLANT (H): ICD-10-CM

## 2022-05-02 LAB
ERYTHROCYTE [DISTWIDTH] IN BLOOD BY AUTOMATED COUNT: 15.8 % (ref 10–15)
HCT VFR BLD AUTO: 35.7 % (ref 35–47)
HGB BLD-MCNC: 12 G/DL (ref 11.7–15.7)
MCH RBC QN AUTO: 29.1 PG (ref 26.5–33)
MCHC RBC AUTO-ENTMCNC: 33.6 G/DL (ref 31.5–36.5)
MCV RBC AUTO: 86 FL (ref 78–100)
PLATELET # BLD AUTO: 146 10E3/UL (ref 150–450)
RBC # BLD AUTO: 4.13 10E6/UL (ref 3.8–5.2)
WBC # BLD AUTO: 3.6 10E3/UL (ref 4–11)

## 2022-05-02 PROCEDURE — 80197 ASSAY OF TACROLIMUS: CPT

## 2022-05-02 PROCEDURE — 80053 COMPREHEN METABOLIC PANEL: CPT

## 2022-05-02 PROCEDURE — 85027 COMPLETE CBC AUTOMATED: CPT

## 2022-05-02 PROCEDURE — 82248 BILIRUBIN DIRECT: CPT

## 2022-05-02 PROCEDURE — 83735 ASSAY OF MAGNESIUM: CPT

## 2022-05-02 PROCEDURE — 36415 COLL VENOUS BLD VENIPUNCTURE: CPT

## 2022-05-02 PROCEDURE — 84100 ASSAY OF PHOSPHORUS: CPT

## 2022-05-03 ENCOUNTER — DOCUMENTATION ONLY (OUTPATIENT)
Dept: TRANSPLANT | Facility: CLINIC | Age: 57
End: 2022-05-03
Payer: COMMERCIAL

## 2022-05-03 ENCOUNTER — TELEPHONE (OUTPATIENT)
Dept: TRANSPLANT | Facility: CLINIC | Age: 57
End: 2022-05-03
Payer: COMMERCIAL

## 2022-05-03 DIAGNOSIS — R74.8 ABNORMAL AST AND ALT: Primary | ICD-10-CM

## 2022-05-03 DIAGNOSIS — Z94.4 STATUS POST LIVER TRANSPLANTATION (H): ICD-10-CM

## 2022-05-03 LAB
ALBUMIN SERPL-MCNC: 4 G/DL (ref 3.4–5)
ALP SERPL-CCNC: 86 U/L (ref 40–150)
ALT SERPL W P-5'-P-CCNC: 91 U/L (ref 0–50)
ANION GAP SERPL CALCULATED.3IONS-SCNC: 6 MMOL/L (ref 3–14)
AST SERPL W P-5'-P-CCNC: 41 U/L (ref 0–45)
BILIRUB DIRECT SERPL-MCNC: 0.1 MG/DL (ref 0–0.2)
BILIRUB SERPL-MCNC: 0.5 MG/DL (ref 0.2–1.3)
BUN SERPL-MCNC: 22 MG/DL (ref 7–30)
CALCIUM SERPL-MCNC: 8.7 MG/DL (ref 8.5–10.1)
CHLORIDE BLD-SCNC: 110 MMOL/L (ref 94–109)
CO2 SERPL-SCNC: 24 MMOL/L (ref 20–32)
CREAT SERPL-MCNC: 0.79 MG/DL (ref 0.52–1.04)
GFR SERPL CREATININE-BSD FRML MDRD: 86 ML/MIN/1.73M2
GLUCOSE BLD-MCNC: 84 MG/DL (ref 70–99)
MAGNESIUM SERPL-MCNC: 1.8 MG/DL (ref 1.6–2.3)
PHOSPHATE SERPL-MCNC: 3.8 MG/DL (ref 2.5–4.5)
POTASSIUM BLD-SCNC: 3.6 MMOL/L (ref 3.4–5.3)
PROT SERPL-MCNC: 7.6 G/DL (ref 6.8–8.8)
SODIUM SERPL-SCNC: 140 MMOL/L (ref 133–144)
TACROLIMUS BLD-MCNC: 9.3 UG/L (ref 5–15)
TME LAST DOSE: NORMAL H
TME LAST DOSE: NORMAL H

## 2022-05-03 RX ORDER — TACROLIMUS 5 MG/1
5 CAPSULE ORAL EVERY 12 HOURS
Qty: 180 CAPSULE | Refills: 3 | Status: SHIPPED | OUTPATIENT
Start: 2022-05-03 | End: 2022-05-24 | Stop reason: DRUGHIGH

## 2022-05-03 NOTE — TELEPHONE ENCOUNTER
ALT up, per Dr. Leventhal, needs liver biospy.  Berta was with her daughter on Sunday who tested positive for Covid last night.   Berta has not had symptoms.  She will hold off on scheduling the biopsy until mid to late next week to assure she doesn't have COVID.  Tac level 9.3.  Currently taking 6 mg in the morning and 5 mg in the evening.  She will decrease to 5 mg po bid. She will recheck all labs next week.

## 2022-05-04 DIAGNOSIS — Z11.59 ENCOUNTER FOR SCREENING FOR OTHER VIRAL DISEASES: Primary | ICD-10-CM

## 2022-05-09 ENCOUNTER — LAB (OUTPATIENT)
Dept: LAB | Facility: CLINIC | Age: 57
End: 2022-05-09
Payer: COMMERCIAL

## 2022-05-09 DIAGNOSIS — Z94.4 LIVER REPLACED BY TRANSPLANT (H): ICD-10-CM

## 2022-05-09 LAB
ERYTHROCYTE [DISTWIDTH] IN BLOOD BY AUTOMATED COUNT: 15.4 % (ref 10–15)
HCT VFR BLD AUTO: 33.6 % (ref 35–47)
HGB BLD-MCNC: 11.4 G/DL (ref 11.7–15.7)
MCH RBC QN AUTO: 29.2 PG (ref 26.5–33)
MCHC RBC AUTO-ENTMCNC: 33.9 G/DL (ref 31.5–36.5)
MCV RBC AUTO: 86 FL (ref 78–100)
PLATELET # BLD AUTO: 162 10E3/UL (ref 150–450)
RBC # BLD AUTO: 3.9 10E6/UL (ref 3.8–5.2)
WBC # BLD AUTO: 4 10E3/UL (ref 4–11)

## 2022-05-09 PROCEDURE — 80053 COMPREHEN METABOLIC PANEL: CPT

## 2022-05-09 PROCEDURE — 84100 ASSAY OF PHOSPHORUS: CPT

## 2022-05-09 PROCEDURE — 80197 ASSAY OF TACROLIMUS: CPT

## 2022-05-09 PROCEDURE — 85027 COMPLETE CBC AUTOMATED: CPT

## 2022-05-09 PROCEDURE — 36415 COLL VENOUS BLD VENIPUNCTURE: CPT

## 2022-05-09 PROCEDURE — 83735 ASSAY OF MAGNESIUM: CPT

## 2022-05-09 PROCEDURE — 82248 BILIRUBIN DIRECT: CPT

## 2022-05-10 ENCOUNTER — TELEPHONE (OUTPATIENT)
Dept: TRANSPLANT | Facility: CLINIC | Age: 57
End: 2022-05-10
Payer: COMMERCIAL

## 2022-05-10 LAB
ALBUMIN SERPL-MCNC: 3.8 G/DL (ref 3.4–5)
ALP SERPL-CCNC: 80 U/L (ref 40–150)
ALT SERPL W P-5'-P-CCNC: 110 U/L (ref 0–50)
ANION GAP SERPL CALCULATED.3IONS-SCNC: 6 MMOL/L (ref 3–14)
AST SERPL W P-5'-P-CCNC: 45 U/L (ref 0–45)
BILIRUB DIRECT SERPL-MCNC: 0.1 MG/DL (ref 0–0.2)
BILIRUB SERPL-MCNC: 0.5 MG/DL (ref 0.2–1.3)
BUN SERPL-MCNC: 18 MG/DL (ref 7–30)
CALCIUM SERPL-MCNC: 8.6 MG/DL (ref 8.5–10.1)
CHLORIDE BLD-SCNC: 111 MMOL/L (ref 94–109)
CO2 SERPL-SCNC: 22 MMOL/L (ref 20–32)
CREAT SERPL-MCNC: 0.73 MG/DL (ref 0.52–1.04)
GFR SERPL CREATININE-BSD FRML MDRD: >90 ML/MIN/1.73M2
GLUCOSE BLD-MCNC: 87 MG/DL (ref 70–99)
MAGNESIUM SERPL-MCNC: 1.7 MG/DL (ref 1.6–2.3)
PHOSPHATE SERPL-MCNC: 3.3 MG/DL (ref 2.5–4.5)
POTASSIUM BLD-SCNC: 3.9 MMOL/L (ref 3.4–5.3)
PROT SERPL-MCNC: 7.3 G/DL (ref 6.8–8.8)
SODIUM SERPL-SCNC: 139 MMOL/L (ref 133–144)
TACROLIMUS BLD-MCNC: 8.6 UG/L (ref 5–15)
TME LAST DOSE: NORMAL H
TME LAST DOSE: NORMAL H

## 2022-05-10 NOTE — TELEPHONE ENCOUNTER
Call to Berta as ALT is up, I noted that her liver biopsy was moved to next Tuesday and had been scheduled for this Friday.  She moved the biopsy because she had the opportunity to go to a [employer] concert on Friday at [facility] - the first really fun thing since her transplant with her  and wanted to go.  Told her this was fine but asked her to be sure to wear a KN95 mask at the concert, she is planning on it.  She is quite worried about her ALT.  We will talk about biopsy results next week.

## 2022-05-11 ENCOUNTER — TELEPHONE (OUTPATIENT)
Dept: TRANSPLANT | Facility: CLINIC | Age: 57
End: 2022-05-11
Payer: COMMERCIAL

## 2022-05-11 NOTE — TELEPHONE ENCOUNTER
Message from Berta saying she needs prior auth for liver biopsy on 5/17. Call to [employer] @ [phone].  Procedure is authorized.  Auth # [id number].  Gave this to Berta.

## 2022-05-13 ENCOUNTER — LAB (OUTPATIENT)
Dept: URGENT CARE | Facility: URGENT CARE | Age: 57
End: 2022-05-13
Attending: INTERNAL MEDICINE
Payer: COMMERCIAL

## 2022-05-13 DIAGNOSIS — Z11.59 ENCOUNTER FOR SCREENING FOR OTHER VIRAL DISEASES: ICD-10-CM

## 2022-05-13 PROCEDURE — U0005 INFEC AGEN DETEC AMPLI PROBE: HCPCS

## 2022-05-13 PROCEDURE — U0003 INFECTIOUS AGENT DETECTION BY NUCLEIC ACID (DNA OR RNA); SEVERE ACUTE RESPIRATORY SYNDROME CORONAVIRUS 2 (SARS-COV-2) (CORONAVIRUS DISEASE [COVID-19]), AMPLIFIED PROBE TECHNIQUE, MAKING USE OF HIGH THROUGHPUT TECHNOLOGIES AS DESCRIBED BY CMS-2020-01-R: HCPCS

## 2022-05-14 LAB — SARS-COV-2 RNA RESP QL NAA+PROBE: NEGATIVE

## 2022-05-16 ENCOUNTER — HOSPITAL ENCOUNTER (OUTPATIENT)
Dept: MAMMOGRAPHY | Facility: CLINIC | Age: 57
Discharge: HOME | End: 2022-05-16
Attending: NURSE PRACTITIONER | Admitting: NURSE PRACTITIONER
Payer: COMMERCIAL

## 2022-05-16 ENCOUNTER — LAB (OUTPATIENT)
Dept: LAB | Facility: CLINIC | Age: 57
End: 2022-05-16
Payer: COMMERCIAL

## 2022-05-16 DIAGNOSIS — Z12.31 VISIT FOR SCREENING MAMMOGRAM: ICD-10-CM

## 2022-05-16 DIAGNOSIS — Z94.4 LIVER REPLACED BY TRANSPLANT (H): ICD-10-CM

## 2022-05-16 LAB
ALBUMIN SERPL-MCNC: 3.9 G/DL (ref 3.4–5)
ALP SERPL-CCNC: 77 U/L (ref 40–150)
ALT SERPL W P-5'-P-CCNC: 171 U/L (ref 0–50)
ANION GAP SERPL CALCULATED.3IONS-SCNC: 7 MMOL/L (ref 3–14)
AST SERPL W P-5'-P-CCNC: 59 U/L (ref 0–45)
BILIRUB DIRECT SERPL-MCNC: 0.1 MG/DL (ref 0–0.2)
BILIRUB SERPL-MCNC: 0.5 MG/DL (ref 0.2–1.3)
BUN SERPL-MCNC: 21 MG/DL (ref 7–30)
CALCIUM SERPL-MCNC: 8.8 MG/DL (ref 8.5–10.1)
CHLORIDE BLD-SCNC: 117 MMOL/L (ref 94–109)
CO2 SERPL-SCNC: 21 MMOL/L (ref 20–32)
CREAT SERPL-MCNC: 0.77 MG/DL (ref 0.52–1.04)
ERYTHROCYTE [DISTWIDTH] IN BLOOD BY AUTOMATED COUNT: 14.9 % (ref 10–15)
GFR SERPL CREATININE-BSD FRML MDRD: 89 ML/MIN/1.73M2
GLUCOSE BLD-MCNC: 87 MG/DL (ref 70–99)
HCT VFR BLD AUTO: 33.6 % (ref 35–47)
HGB BLD-MCNC: 11.4 G/DL (ref 11.7–15.7)
MAGNESIUM SERPL-MCNC: 1.8 MG/DL (ref 1.6–2.3)
MCH RBC QN AUTO: 29.4 PG (ref 26.5–33)
MCHC RBC AUTO-ENTMCNC: 33.9 G/DL (ref 31.5–36.5)
MCV RBC AUTO: 87 FL (ref 78–100)
PHOSPHATE SERPL-MCNC: 3.9 MG/DL (ref 2.5–4.5)
PLATELET # BLD AUTO: 145 10E3/UL (ref 150–450)
POTASSIUM BLD-SCNC: 3.4 MMOL/L (ref 3.4–5.3)
PROT SERPL-MCNC: 7.4 G/DL (ref 6.8–8.8)
RBC # BLD AUTO: 3.88 10E6/UL (ref 3.8–5.2)
SODIUM SERPL-SCNC: 145 MMOL/L (ref 133–144)
TACROLIMUS BLD-MCNC: 9 UG/L (ref 5–15)
TME LAST DOSE: NORMAL H
TME LAST DOSE: NORMAL H
WBC # BLD AUTO: 3.5 10E3/UL (ref 4–11)

## 2022-05-16 PROCEDURE — 83735 ASSAY OF MAGNESIUM: CPT

## 2022-05-16 PROCEDURE — 84100 ASSAY OF PHOSPHORUS: CPT

## 2022-05-16 PROCEDURE — 85027 COMPLETE CBC AUTOMATED: CPT

## 2022-05-16 PROCEDURE — 80197 ASSAY OF TACROLIMUS: CPT

## 2022-05-16 PROCEDURE — 36415 COLL VENOUS BLD VENIPUNCTURE: CPT

## 2022-05-16 PROCEDURE — 82248 BILIRUBIN DIRECT: CPT

## 2022-05-16 PROCEDURE — 77067 SCR MAMMO BI INCL CAD: CPT

## 2022-05-16 PROCEDURE — 80053 COMPREHEN METABOLIC PANEL: CPT

## 2022-05-17 ENCOUNTER — APPOINTMENT (OUTPATIENT)
Dept: MEDSURG UNIT | Facility: CLINIC | Age: 57
End: 2022-05-17
Attending: INTERNAL MEDICINE
Payer: COMMERCIAL

## 2022-05-17 ENCOUNTER — HOSPITAL ENCOUNTER (OUTPATIENT)
Facility: CLINIC | Age: 57
Discharge: HOME | End: 2022-05-17
Attending: INTERNAL MEDICINE | Admitting: INTERNAL MEDICINE
Payer: COMMERCIAL

## 2022-05-17 ENCOUNTER — APPOINTMENT (OUTPATIENT)
Dept: INTERVENTIONAL RADIOLOGY/VASCULAR | Facility: CLINIC | Age: 57
End: 2022-05-17
Attending: INTERNAL MEDICINE
Payer: COMMERCIAL

## 2022-05-17 VITALS
WEIGHT: 155 LBS | RESPIRATION RATE: 16 BRPM | OXYGEN SATURATION: 98 % | BODY MASS INDEX: 24.91 KG/M2 | HEIGHT: 66 IN | SYSTOLIC BLOOD PRESSURE: 102 MMHG | DIASTOLIC BLOOD PRESSURE: 72 MMHG | HEART RATE: 82 BPM | TEMPERATURE: 98.9 F

## 2022-05-17 DIAGNOSIS — R74.8 ABNORMAL AST AND ALT: ICD-10-CM

## 2022-05-17 LAB — INR PPP: 1.09 (ref 0.85–1.15)

## 2022-05-17 PROCEDURE — 99152 MOD SED SAME PHYS/QHP 5/>YRS: CPT | Performed by: PHYSICIAN ASSISTANT

## 2022-05-17 PROCEDURE — 272N000505 IR LIVER BIOPSY PERCUTANEOUS

## 2022-05-17 PROCEDURE — 250N000009 HC RX 250: Performed by: RADIOLOGY

## 2022-05-17 PROCEDURE — 85610 PROTHROMBIN TIME: CPT | Performed by: NURSE PRACTITIONER

## 2022-05-17 PROCEDURE — 999N000142 HC STATISTIC PROCEDURE PREP ONLY

## 2022-05-17 PROCEDURE — 88307 TISSUE EXAM BY PATHOLOGIST: CPT | Mod: TC | Performed by: INTERNAL MEDICINE

## 2022-05-17 PROCEDURE — 99152 MOD SED SAME PHYS/QHP 5/>YRS: CPT

## 2022-05-17 PROCEDURE — 47000 NEEDLE BIOPSY OF LIVER PERQ: CPT | Performed by: PHYSICIAN ASSISTANT

## 2022-05-17 PROCEDURE — 999N000133 HC STATISTIC PP CARE STAGE 2

## 2022-05-17 PROCEDURE — 36415 COLL VENOUS BLD VENIPUNCTURE: CPT | Performed by: NURSE PRACTITIONER

## 2022-05-17 PROCEDURE — 76942 ECHO GUIDE FOR BIOPSY: CPT | Mod: 26 | Performed by: PHYSICIAN ASSISTANT

## 2022-05-17 PROCEDURE — 250N000011 HC RX IP 250 OP 636: Performed by: RADIOLOGY

## 2022-05-17 RX ORDER — NALOXONE HYDROCHLORIDE 0.4 MG/ML
0.2 INJECTION, SOLUTION INTRAMUSCULAR; INTRAVENOUS; SUBCUTANEOUS
Status: DISCONTINUED | OUTPATIENT
Start: 2022-05-17 | End: 2022-05-17 | Stop reason: HOSPADM

## 2022-05-17 RX ORDER — FLUMAZENIL 0.1 MG/ML
0.2 INJECTION, SOLUTION INTRAVENOUS
Status: DISCONTINUED | OUTPATIENT
Start: 2022-05-17 | End: 2022-05-17 | Stop reason: HOSPADM

## 2022-05-17 RX ORDER — FENTANYL CITRATE 50 UG/ML
25-50 INJECTION, SOLUTION INTRAMUSCULAR; INTRAVENOUS EVERY 5 MIN PRN
Status: DISCONTINUED | OUTPATIENT
Start: 2022-05-17 | End: 2022-05-17 | Stop reason: HOSPADM

## 2022-05-17 RX ORDER — SODIUM CHLORIDE 9 MG/ML
INJECTION, SOLUTION INTRAVENOUS CONTINUOUS
Status: DISCONTINUED | OUTPATIENT
Start: 2022-05-17 | End: 2022-05-17 | Stop reason: HOSPADM

## 2022-05-17 RX ORDER — NALOXONE HYDROCHLORIDE 0.4 MG/ML
0.4 INJECTION, SOLUTION INTRAMUSCULAR; INTRAVENOUS; SUBCUTANEOUS
Status: DISCONTINUED | OUTPATIENT
Start: 2022-05-17 | End: 2022-05-17 | Stop reason: HOSPADM

## 2022-05-17 RX ORDER — LIDOCAINE 40 MG/G
CREAM TOPICAL
Status: DISCONTINUED | OUTPATIENT
Start: 2022-05-17 | End: 2022-05-17 | Stop reason: HOSPADM

## 2022-05-17 RX ADMIN — FENTANYL CITRATE 50 MCG: 50 INJECTION, SOLUTION INTRAMUSCULAR; INTRAVENOUS at 12:15

## 2022-05-17 RX ADMIN — LIDOCAINE HYDROCHLORIDE 8 ML: 10 INJECTION, SOLUTION EPIDURAL; INFILTRATION; INTRACAUDAL; PERINEURAL at 12:24

## 2022-05-17 RX ADMIN — FENTANYL CITRATE 25 MCG: 50 INJECTION, SOLUTION INTRAMUSCULAR; INTRAVENOUS at 12:23

## 2022-05-17 RX ADMIN — MIDAZOLAM HYDROCHLORIDE 0.5 MG: 1 INJECTION, SOLUTION INTRAMUSCULAR; INTRAVENOUS at 12:22

## 2022-05-17 RX ADMIN — FENTANYL CITRATE 25 MCG: 50 INJECTION, SOLUTION INTRAMUSCULAR; INTRAVENOUS at 12:07

## 2022-05-17 RX ADMIN — MIDAZOLAM HYDROCHLORIDE 0.5 MG: 1 INJECTION, SOLUTION INTRAMUSCULAR; INTRAVENOUS at 12:07

## 2022-05-17 RX ADMIN — MIDAZOLAM HYDROCHLORIDE 1 MG: 1 INJECTION, SOLUTION INTRAMUSCULAR; INTRAVENOUS at 12:15

## 2022-05-17 NOTE — IR NOTE
Patient Name: Berta Nava  Medical Record Number: [id number]  Today's Date: 5/17/2022    Procedure: US liver Biopsy  Proceduralist: Nicola Dodd  Pathology present: NA    Procedure Start: 1217  Procedure end: 1225  Sedation medications administered: Fentanyl 100 mcg, Versed 2 mg, Lidocaine 8 ml's.     Report given to: CHITO Tolentino  : no    Other Notes: Pt arrived to IR room IR US 6 from A. Consent reviewed. Pt denies any questions or concerns regarding procedure. Pt positioned supine and monitored per protocol. Pt tolerated procedure without any noted complications. Pt transferred back to 1228.

## 2022-05-17 NOTE — PRE-PROCEDURE
GENERAL PRE-PROCEDURE:     Verbal consent obtained?: Yes    Written consent obtained?: Yes    Risks and benefits: Risks, benefits and alternatives were discussed    Consent given by:  Patient  Patient states understanding of procedure being performed: Yes    Patient's understanding of procedure matches consent: Yes    Procedure consent matches procedure scheduled: Yes    Expected level of sedation:  Moderate  Appropriately NPO:  Yes  ASA Class:  3  Mallampati  :  Grade 2- soft palate, base of uvula, tonsillar pillars, and portion of posterior pharyngeal wall visible  Lungs:  Lungs clear with good breath sounds bilaterally  Heart:  Normal heart sounds and rate  History & Physical reviewed:  History and physical reviewed and no updates needed  Statement of review:  I have reviewed the lab findings, diagnostic data, medications, and the plan for sedation

## 2022-05-17 NOTE — PROGRESS NOTES
Liver biopsy RUQ abdominal site with primapore with scant amount of drainage. Patient ambulating independently, voiding spontaneously. PIV removed. Discharge paperwork reviewed with patient, educated patient on activity restrictions and when to seek medical attention, pt stated understanding. Patient's  Demar will transport patient home.   Normal

## 2022-05-17 NOTE — PROGRESS NOTES
Arrived from home for a transplanted liver biopsy.  VSS.  Denies pain.  PIV placed and labs sent.  H&P current.  Needs consent.

## 2022-05-17 NOTE — PROCEDURES
[facility]    Procedure: IR Procedure Note    Date/Time: 5/17/2022 12:27 PM  Performed by: Devon Dodd PA-C  Authorized by: Devon Dodd PA-C       UNIVERSAL PROTOCOL   Site Marked: NA  Prior Images Obtained and Reviewed:  Yes  Required items: Required blood products, implants, devices and special equipment available    Patient identity confirmed:  Verbally with patient, arm band, provided demographic data and hospital-assigned identification number  Patient was reevaluated immediately before administering moderate or deep sedation or anesthesia  Confirmation Checklist:  Patient's identity using two indicators, relevant allergies, procedure was appropriate and matched the consent or emergent situation and correct equipment/implants were available  Time out: Immediately prior to the procedure a time out was called    Universal Protocol: the Joint Commission Universal Protocol was followed    Preparation: Patient was prepped and draped in usual sterile fashion       ANESTHESIA    Anesthesia: Local infiltration  Local Anesthetic:  Lidocaine 1% without epinephrine      SEDATION  Patient Sedated: Yes    Vital signs: Vital signs monitored during sedation    See dictated procedure note for full details.  Findings: Sedation medications administered: Fentanyl 100 mcg, Versed 2 mg  Sedation time: 10 minutes    Specimens: core needle biopsy specimens sent for pathological analysis    Complications: None    Condition: Stable      PROCEDURE  Describe Procedure: Berta Nava  [id number]    Completed ultrasound guided random liver biopsy. A total of two passes yielded liver tissue cores collected for pathological evaluation.  No immediate complications. Dx: elevated liver enzymes; status post liver transplant.  Rhea    Sedation medications administered: Fentanyl 100 mcg, Versed 2 mg  Sedation time: 10 minutes    Patient Tolerance:  Patient tolerated  the procedure well with no immediate complications  Length of time physician/provider present for 1:1 monitoring during sedation: 10

## 2022-05-17 NOTE — DISCHARGE INSTRUCTIONS
[facility]    Interventional Radiology  Patient Instructions Following Biopsy of Transplanted Liver    AFTER YOU GO HOME  If you were given sedation DO NOT drive or operate machinery at home or at work for at least 24 hours  DO relax and take it easy for 48 hours, no strenuous activity for 24 hours  DO drink plenty of fluids  DO resume your regular diet, unless otherwise instructed by your Primary Physician  Keep the dressing dry and in place for 24 hours.  DO NOT SMOKE FOR AT LEAST 24 HOURS, if you have been given any medications that were to help you relax or sedate you during your procedure  DO NOT drink alcoholic beverages the day of your procedure  DO NOT do any strenuous exercise or lifting (> 10 lbs) for at least 7 days following your procedure  DO NOT take a bath or shower for at least 12 hours following your procedure  Remove dressing after shower the next day. Replace with Band aid for 2 days.  Never leave a wet dressing in place.  DO NOT make any important or legal decisions for 24 hours following your procedure  There should be minimum drainage from the biopsy site    CALL THE PHYSICIAN IF:  You start bleeding from the procedure site.  If you do start to bleed from that site, lie down flat and hold pressure on the site for a minimum of 10 minutes.  Your physician will tell you if you need to return to the hospital  You develop nausea or vomiting  You have excessive swelling, redness, or tenderness at the site  You have drainage that looks like it is infected.  You experience severe pain  You develop hives or a rash or unexplained itching  You develop shortness of breath  You develop a temperature of 101 degrees F or greater      [facility] INTERVENTIONAL RADIOLOGY DEPARTMENT  Procedure Physician:  Nicola Dodd                                     Date of procedure: May 17, 2022  Telephone Numbers: [phone] Monday-Friday 8:00 am to 4:30 pm  [phone] After 4:30 pm Monday-Friday,  Weekends & Holidays.   Ask for the Interventional Radiologist on call.  Someone is on call 24 hrs/day  [facility] toll free number: [phone] Monday-Friday 8:00 am to 4:30 pm  [facility] Emergency Dept: [phone]

## 2022-05-18 ENCOUNTER — TELEPHONE (OUTPATIENT)
Dept: TRANSPLANT | Facility: CLINIC | Age: 57
End: 2022-05-18
Payer: COMMERCIAL

## 2022-05-18 LAB
PATH REPORT.COMMENTS IMP SPEC: NORMAL
PATH REPORT.FINAL DX SPEC: NORMAL
PATH REPORT.GROSS SPEC: NORMAL
PATH REPORT.MICROSCOPIC SPEC OTHER STN: NORMAL
PATH REPORT.RELEVANT HX SPEC: NORMAL
PHOTO IMAGE: NORMAL

## 2022-05-18 PROCEDURE — 88313 SPECIAL STAINS GROUP 2: CPT | Mod: 26 | Performed by: PATHOLOGY

## 2022-05-18 PROCEDURE — 88307 TISSUE EXAM BY PATHOLOGIST: CPT | Mod: 26 | Performed by: PATHOLOGY

## 2022-05-18 NOTE — TELEPHONE ENCOUNTER
Call to Berta to see how things went yesterday with liver biopsy.  She's kind of nervous because her lft's were up again today. Let her know that someone will be in touch w/ her either tomorrow or Friday, asked her to call the transplant office if she doesn't hear from someone by  Friday afternoon as someone will need to follow-up on bx result. She is feeling well.

## 2022-05-19 ENCOUNTER — TELEPHONE (OUTPATIENT)
Dept: TRANSPLANT | Facility: CLINIC | Age: 57
End: 2022-05-19
Payer: COMMERCIAL

## 2022-05-19 DIAGNOSIS — Z94.4 LIVER TRANSPLANTED (H): Primary | ICD-10-CM

## 2022-05-19 DIAGNOSIS — T86.41 MILD ACUTE REJECTION OF LIVER TRANSPLANT (H): ICD-10-CM

## 2022-05-19 RX ORDER — MYCOPHENOLATE MOFETIL 250 MG/1
750 CAPSULE ORAL EVERY 12 HOURS
Qty: 180 CAPSULE | Refills: 11 | Status: SHIPPED | OUTPATIENT
Start: 2022-05-19 | End: 2022-06-16

## 2022-05-19 NOTE — TELEPHONE ENCOUNTER
Called Berta with update from Dr. Leventhal,    Mild rejection  - TAC trough goal 6-8   - Restart MMF 750mg BID   - Continue on Pred     Berta has a supply of MMF at home and will start today. RX sent to her local [facility] as well. We discussed results and plan and Berta has no further questions at this time and verbalized understanding. She plans to have labs drawn again on Monday.

## 2022-05-23 ENCOUNTER — LAB (OUTPATIENT)
Dept: LAB | Facility: CLINIC | Age: 57
End: 2022-05-23
Payer: COMMERCIAL

## 2022-05-23 DIAGNOSIS — Z94.4 LIVER REPLACED BY TRANSPLANT (H): ICD-10-CM

## 2022-05-23 LAB
ALBUMIN SERPL-MCNC: 3.7 G/DL (ref 3.4–5)
ALP SERPL-CCNC: 84 U/L (ref 40–150)
ALT SERPL W P-5'-P-CCNC: 83 U/L (ref 0–50)
ANION GAP SERPL CALCULATED.3IONS-SCNC: 5 MMOL/L (ref 3–14)
AST SERPL W P-5'-P-CCNC: 25 U/L (ref 0–45)
BILIRUB DIRECT SERPL-MCNC: <0.1 MG/DL (ref 0–0.2)
BILIRUB SERPL-MCNC: 0.4 MG/DL (ref 0.2–1.3)
BUN SERPL-MCNC: 21 MG/DL (ref 7–30)
CALCIUM SERPL-MCNC: 8.7 MG/DL (ref 8.5–10.1)
CHLORIDE BLD-SCNC: 114 MMOL/L (ref 94–109)
CO2 SERPL-SCNC: 25 MMOL/L (ref 20–32)
CREAT SERPL-MCNC: 0.8 MG/DL (ref 0.52–1.04)
ERYTHROCYTE [DISTWIDTH] IN BLOOD BY AUTOMATED COUNT: 14.6 % (ref 10–15)
GFR SERPL CREATININE-BSD FRML MDRD: 85 ML/MIN/1.73M2
GLUCOSE BLD-MCNC: 87 MG/DL (ref 70–99)
HCT VFR BLD AUTO: 34.2 % (ref 35–47)
HGB BLD-MCNC: 11.8 G/DL (ref 11.7–15.7)
MAGNESIUM SERPL-MCNC: 1.7 MG/DL (ref 1.6–2.3)
MCH RBC QN AUTO: 29.6 PG (ref 26.5–33)
MCHC RBC AUTO-ENTMCNC: 34.5 G/DL (ref 31.5–36.5)
MCV RBC AUTO: 86 FL (ref 78–100)
PHOSPHATE SERPL-MCNC: 4 MG/DL (ref 2.5–4.5)
PLATELET # BLD AUTO: 131 10E3/UL (ref 150–450)
POTASSIUM BLD-SCNC: 4.3 MMOL/L (ref 3.4–5.3)
PROT SERPL-MCNC: 7.5 G/DL (ref 6.8–8.8)
RBC # BLD AUTO: 3.99 10E6/UL (ref 3.8–5.2)
SODIUM SERPL-SCNC: 144 MMOL/L (ref 133–144)
TACROLIMUS BLD-MCNC: 11.4 UG/L (ref 5–15)
TME LAST DOSE: NORMAL H
TME LAST DOSE: NORMAL H
WBC # BLD AUTO: 3.7 10E3/UL (ref 4–11)

## 2022-05-23 PROCEDURE — 85027 COMPLETE CBC AUTOMATED: CPT

## 2022-05-23 PROCEDURE — 82248 BILIRUBIN DIRECT: CPT

## 2022-05-23 PROCEDURE — 80053 COMPREHEN METABOLIC PANEL: CPT

## 2022-05-23 PROCEDURE — 83735 ASSAY OF MAGNESIUM: CPT

## 2022-05-23 PROCEDURE — 80197 ASSAY OF TACROLIMUS: CPT

## 2022-05-23 PROCEDURE — 84100 ASSAY OF PHOSPHORUS: CPT

## 2022-05-23 PROCEDURE — 36415 COLL VENOUS BLD VENIPUNCTURE: CPT

## 2022-05-24 ENCOUNTER — TELEPHONE (OUTPATIENT)
Dept: TRANSPLANT | Facility: CLINIC | Age: 57
End: 2022-05-24
Payer: COMMERCIAL

## 2022-05-24 DIAGNOSIS — R18.8 CIRRHOSIS OF LIVER WITH ASCITES, UNSPECIFIED HEPATIC CIRRHOSIS TYPE (H): ICD-10-CM

## 2022-05-24 DIAGNOSIS — Z94.4 LIVER TRANSPLANTED (H): Primary | ICD-10-CM

## 2022-05-24 DIAGNOSIS — K74.60 CIRRHOSIS OF LIVER WITH ASCITES, UNSPECIFIED HEPATIC CIRRHOSIS TYPE (H): ICD-10-CM

## 2022-05-24 DIAGNOSIS — K83.01 PSC (PRIMARY SCLEROSING CHOLANGITIS) (H): ICD-10-CM

## 2022-05-24 RX ORDER — TACROLIMUS 1 MG/1
4 CAPSULE ORAL EVERY 12 HOURS
Qty: 720 CAPSULE | Refills: 3 | Status: SHIPPED | OUTPATIENT
Start: 2022-05-24 | End: 2022-06-07

## 2022-05-24 RX ORDER — URSODIOL 300 MG/1
300 CAPSULE ORAL 2 TIMES DAILY
Qty: 180 CAPSULE | Refills: 3 | Status: SHIPPED | OUTPATIENT
Start: 2022-05-24 | End: 2022-12-05

## 2022-05-24 NOTE — TELEPHONE ENCOUNTER
ISSUE:   Tacrolimus IR level 11.4 on 5/23, goal 6-8, dose 5 mg BID.    PLAN:   Please call patient and confirm this was an accurate 12-hour trough. Verify Tacrolimus IR dose.Confirm no new medications or illness. Confirm no missed doses. If accurate trough and accurate dose, decrease Tacrolimus IR dose to 4 mg BID and repeat labs in 1 week. Encourage hydration.  Vanessa Durant RN    OUTCOME:   Spoke with patient, they confirm accurate trough level and current dose 5 mg BID. Patient confirmed dose change to 4 mg BID and to repeat labs in 1 weeks. Orders sent to preferred pharmacy for dose change and lab for repeat labs. Patient voiced understanding of plan.     Anne Marie Miranda LPN

## 2022-05-24 NOTE — TELEPHONE ENCOUNTER
Call to Berta to check in.  She is pleased that her liver tests look better.  She is scheduled for colonoscopy on 6/8.  She isn't having any side effects from cellcept.  Berta asking if we would consider stopping ursodiol.  Dr. Leventhal would like her to stay on it.  Berta aware.

## 2022-05-26 ENCOUNTER — TELEPHONE (OUTPATIENT)
Dept: GASTROENTEROLOGY | Facility: CLINIC | Age: 57
End: 2022-05-26
Payer: COMMERCIAL

## 2022-05-26 NOTE — TELEPHONE ENCOUNTER
Pre assessment questions completed for upcoming colonoscopy procedure scheduled on 6.8.2022    COVID test scheduled 6.4.2022    Reviewed procedural arrival time 0715 and facility location [facility].    Designated  policy reviewed. Instructed to have someone stay 6 hours post procedure.     Anticoagulation/blood thinners? no    Electronic implanted devices? no    Reviewed Miralax/Magnesium citrate/Dulcolax prep instructions with patient. No fiber/iron supplements or foods that contain nuts/seeds prior to procedure.     Patient verbalized understanding and had no questions or concerns at this time.    Randi Fisher RN

## 2022-05-30 DIAGNOSIS — Z11.59 ENCOUNTER FOR SCREENING FOR OTHER VIRAL DISEASES: Primary | ICD-10-CM

## 2022-05-31 ENCOUNTER — INFUSION THERAPY VISIT (OUTPATIENT)
Dept: NURSING | Facility: CLINIC | Age: 57
End: 2022-05-31
Payer: COMMERCIAL

## 2022-05-31 DIAGNOSIS — Z29.89 PROPHYLACTIC IMMUNOTHERAPY: Primary | ICD-10-CM

## 2022-05-31 PROCEDURE — M0220 PR INJECTION TIXAGEVIMAB & CILGAVIMAB (EVUSHELD): HCPCS | Performed by: INTERNAL MEDICINE

## 2022-05-31 NOTE — PROGRESS NOTES
Evusheld Consent   Confirmed patient received the Emergency Use Authorization Fact Sheet for Patients, Parents and Caregivers prior to receiving medication. We discussed the following risks and benefits of receiving EVUSHELD, as well as alternative treatments and the patient wished to proceed with EVUSHELD.     Providers believe the benefits of Evusheld outweigh the risks for all moderately to severely immunocompromised patients.      Benefits:   Evusheld is a synthetic antibody that provides protection against COVID-19 for 6 months and is recommended for patients that have a weakened immune system that may not be able to make antibodies themselves.     Risks:    There is a very small risk of allergic reactions and you should notify us if you have any symptoms of an allergic reaction after the injection. There were also some extremely rare cardiac events reported in the initial trials in people that already had heart disease, but experts do not think these were caused by the medication. We will observe you for any chest pain or trouble breathing after the injections and you can reach out to your provider if any of these symptoms develop.     Alternatives:   Vaccines to prevent COVID-19 are approved or available under Emergency Use Authorization. Use of EVUSHELD does not replace vaccination against COVID-19. You can continue to mask and isolate to avoid infections. It is your choice to receive or not receive EVUSHELD. Should you decide not to receive EVUSHELD, it will not change your standard medical care.     Patient does consent to the injection.    EVUSHELD Administration Note:  Berta Nava presents today for EVUSHELD.   present during visit today: Not Applicable.    Consent:   Informed Consent confirmed in chart, obtained on this date: 05/31/2022    Post Injection Assessment:   Patient tolerated injection without incident.  Patient observed for 60 minutes post injection per protocol.      Patient  was observed in the room for a minimum of 10 minutes after injection per standard, then remained in the buidling for a total 60 minute observation period.         Discharge Plan:    Discharge instructions reviewed with: Patient.  Patient and/or family verbalized understanding of discharge instructions and all questions answered.  Patient discharged in stable condition accompanied by: self.     Denice David RN

## 2022-06-01 PROBLEM — T86.41 LIVER TRANSPLANT REJECTION (H): Status: ACTIVE | Noted: 2022-05-17

## 2022-06-04 ENCOUNTER — LAB (OUTPATIENT)
Dept: URGENT CARE | Facility: URGENT CARE | Age: 57
End: 2022-06-04
Payer: COMMERCIAL

## 2022-06-04 DIAGNOSIS — Z11.59 ENCOUNTER FOR SCREENING FOR OTHER VIRAL DISEASES: ICD-10-CM

## 2022-06-04 PROCEDURE — U0005 INFEC AGEN DETEC AMPLI PROBE: HCPCS

## 2022-06-04 PROCEDURE — U0003 INFECTIOUS AGENT DETECTION BY NUCLEIC ACID (DNA OR RNA); SEVERE ACUTE RESPIRATORY SYNDROME CORONAVIRUS 2 (SARS-COV-2) (CORONAVIRUS DISEASE [COVID-19]), AMPLIFIED PROBE TECHNIQUE, MAKING USE OF HIGH THROUGHPUT TECHNOLOGIES AS DESCRIBED BY CMS-2020-01-R: HCPCS

## 2022-06-05 LAB — SARS-COV-2 RNA RESP QL NAA+PROBE: NEGATIVE

## 2022-06-06 ENCOUNTER — LAB (OUTPATIENT)
Dept: LAB | Facility: CLINIC | Age: 57
End: 2022-06-06
Payer: COMMERCIAL

## 2022-06-06 DIAGNOSIS — Z94.4 LIVER REPLACED BY TRANSPLANT (H): ICD-10-CM

## 2022-06-06 LAB
ERYTHROCYTE [DISTWIDTH] IN BLOOD BY AUTOMATED COUNT: 14.5 % (ref 10–15)
HCT VFR BLD AUTO: 33.5 % (ref 35–47)
HGB BLD-MCNC: 11.4 G/DL (ref 11.7–15.7)
MCH RBC QN AUTO: 29.1 PG (ref 26.5–33)
MCHC RBC AUTO-ENTMCNC: 34 G/DL (ref 31.5–36.5)
MCV RBC AUTO: 86 FL (ref 78–100)
PLATELET # BLD AUTO: 137 10E3/UL (ref 150–450)
RBC # BLD AUTO: 3.92 10E6/UL (ref 3.8–5.2)
TACROLIMUS BLD-MCNC: 9.7 UG/L (ref 5–15)
TME LAST DOSE: NORMAL H
TME LAST DOSE: NORMAL H
WBC # BLD AUTO: 3.7 10E3/UL (ref 4–11)

## 2022-06-06 PROCEDURE — 80197 ASSAY OF TACROLIMUS: CPT

## 2022-06-06 PROCEDURE — 36415 COLL VENOUS BLD VENIPUNCTURE: CPT

## 2022-06-06 PROCEDURE — 82248 BILIRUBIN DIRECT: CPT

## 2022-06-06 PROCEDURE — 80053 COMPREHEN METABOLIC PANEL: CPT

## 2022-06-06 PROCEDURE — 85027 COMPLETE CBC AUTOMATED: CPT

## 2022-06-06 PROCEDURE — 84100 ASSAY OF PHOSPHORUS: CPT

## 2022-06-06 PROCEDURE — 83735 ASSAY OF MAGNESIUM: CPT

## 2022-06-07 ENCOUNTER — TELEPHONE (OUTPATIENT)
Dept: TRANSPLANT | Facility: CLINIC | Age: 57
End: 2022-06-07
Payer: COMMERCIAL

## 2022-06-07 ENCOUNTER — ANESTHESIA EVENT (OUTPATIENT)
Dept: SURGERY | Facility: AMBULATORY SURGERY CENTER | Age: 57
End: 2022-06-07
Payer: COMMERCIAL

## 2022-06-07 DIAGNOSIS — Z94.4 LIVER TRANSPLANTED (H): Primary | ICD-10-CM

## 2022-06-07 LAB
ALBUMIN SERPL-MCNC: 3.8 G/DL (ref 3.4–5)
ALP SERPL-CCNC: 73 U/L (ref 40–150)
ALT SERPL W P-5'-P-CCNC: 59 U/L (ref 0–50)
ANION GAP SERPL CALCULATED.3IONS-SCNC: 7 MMOL/L (ref 3–14)
AST SERPL W P-5'-P-CCNC: 31 U/L (ref 0–45)
BILIRUB DIRECT SERPL-MCNC: 0.1 MG/DL (ref 0–0.2)
BILIRUB SERPL-MCNC: 0.4 MG/DL (ref 0.2–1.3)
BUN SERPL-MCNC: 26 MG/DL (ref 7–30)
CALCIUM SERPL-MCNC: 9 MG/DL (ref 8.5–10.1)
CHLORIDE BLD-SCNC: 114 MMOL/L (ref 94–109)
CO2 SERPL-SCNC: 22 MMOL/L (ref 20–32)
CREAT SERPL-MCNC: 0.78 MG/DL (ref 0.52–1.04)
GFR SERPL CREATININE-BSD FRML MDRD: 88 ML/MIN/1.73M2
GLUCOSE BLD-MCNC: 87 MG/DL (ref 70–99)
MAGNESIUM SERPL-MCNC: 1.8 MG/DL (ref 1.6–2.3)
PHOSPHATE SERPL-MCNC: 4.4 MG/DL (ref 2.5–4.5)
POTASSIUM BLD-SCNC: 3.8 MMOL/L (ref 3.4–5.3)
PROT SERPL-MCNC: 7.6 G/DL (ref 6.8–8.8)
SODIUM SERPL-SCNC: 143 MMOL/L (ref 133–144)

## 2022-06-07 RX ORDER — TACROLIMUS 1 MG/1
3 CAPSULE ORAL EVERY 12 HOURS
Qty: 340 CAPSULE | Refills: 3 | Status: SHIPPED | OUTPATIENT
Start: 2022-06-07 | End: 2022-08-11

## 2022-06-07 NOTE — TELEPHONE ENCOUNTER
Spoke to Berta.  Tac level yesterday was 9.7, currently taking 4 mg in the morning and 4 mg in the evening.  She will decrease to 3 mg bid.      She is doing well though she has gained a little weight.  She is active. She is watching her weight.  She is walking up to 5 miles a day.    She has a colonoscopy tomorrow and see's Dr. Leventhal next week.

## 2022-06-08 ENCOUNTER — ANESTHESIA (OUTPATIENT)
Dept: SURGERY | Facility: AMBULATORY SURGERY CENTER | Age: 57
End: 2022-06-08
Payer: COMMERCIAL

## 2022-06-08 ENCOUNTER — HOSPITAL ENCOUNTER (OUTPATIENT)
Facility: AMBULATORY SURGERY CENTER | Age: 57
Discharge: HOME | End: 2022-06-08
Attending: INTERNAL MEDICINE
Payer: COMMERCIAL

## 2022-06-08 VITALS
WEIGHT: 155 LBS | TEMPERATURE: 98 F | BODY MASS INDEX: 24.91 KG/M2 | RESPIRATION RATE: 12 BRPM | SYSTOLIC BLOOD PRESSURE: 102 MMHG | HEIGHT: 66 IN | HEART RATE: 79 BPM | OXYGEN SATURATION: 100 % | DIASTOLIC BLOOD PRESSURE: 62 MMHG

## 2022-06-08 VITALS — HEART RATE: 74 BPM

## 2022-06-08 DIAGNOSIS — K51.019 ULCERATIVE PANCOLITIS WITH COMPLICATION (H): Primary | ICD-10-CM

## 2022-06-08 LAB — COLONOSCOPY: NORMAL

## 2022-06-08 PROCEDURE — 88305 TISSUE EXAM BY PATHOLOGIST: CPT | Mod: TC | Performed by: INTERNAL MEDICINE

## 2022-06-08 PROCEDURE — 45380 COLONOSCOPY AND BIOPSY: CPT | Mod: 33

## 2022-06-08 RX ORDER — SODIUM CHLORIDE, SODIUM LACTATE, POTASSIUM CHLORIDE, CALCIUM CHLORIDE 600; 310; 30; 20 MG/100ML; MG/100ML; MG/100ML; MG/100ML
INJECTION, SOLUTION INTRAVENOUS CONTINUOUS PRN
Status: DISCONTINUED | OUTPATIENT
Start: 2022-06-08 | End: 2022-06-08

## 2022-06-08 RX ORDER — ONDANSETRON 4 MG/1
4 TABLET, ORALLY DISINTEGRATING ORAL EVERY 6 HOURS PRN
Status: DISCONTINUED | OUTPATIENT
Start: 2022-06-08 | End: 2022-06-09 | Stop reason: HOSPADM

## 2022-06-08 RX ORDER — FLUMAZENIL 0.1 MG/ML
0.2 INJECTION, SOLUTION INTRAVENOUS
Status: ACTIVE | OUTPATIENT
Start: 2022-06-08 | End: 2022-06-08

## 2022-06-08 RX ORDER — ONDANSETRON 2 MG/ML
4 INJECTION INTRAMUSCULAR; INTRAVENOUS EVERY 6 HOURS PRN
Status: DISCONTINUED | OUTPATIENT
Start: 2022-06-08 | End: 2022-06-09 | Stop reason: HOSPADM

## 2022-06-08 RX ORDER — PROPOFOL 10 MG/ML
INJECTION, EMULSION INTRAVENOUS PRN
Status: DISCONTINUED | OUTPATIENT
Start: 2022-06-08 | End: 2022-06-08

## 2022-06-08 RX ORDER — PROPOFOL 10 MG/ML
INJECTION, EMULSION INTRAVENOUS CONTINUOUS PRN
Status: DISCONTINUED | OUTPATIENT
Start: 2022-06-08 | End: 2022-06-08

## 2022-06-08 RX ORDER — LIDOCAINE 40 MG/G
CREAM TOPICAL
Status: DISCONTINUED | OUTPATIENT
Start: 2022-06-08 | End: 2022-06-08 | Stop reason: HOSPADM

## 2022-06-08 RX ORDER — NALOXONE HYDROCHLORIDE 0.4 MG/ML
0.2 INJECTION, SOLUTION INTRAMUSCULAR; INTRAVENOUS; SUBCUTANEOUS
Status: DISCONTINUED | OUTPATIENT
Start: 2022-06-08 | End: 2022-06-09 | Stop reason: HOSPADM

## 2022-06-08 RX ORDER — NALOXONE HYDROCHLORIDE 0.4 MG/ML
0.4 INJECTION, SOLUTION INTRAMUSCULAR; INTRAVENOUS; SUBCUTANEOUS
Status: DISCONTINUED | OUTPATIENT
Start: 2022-06-08 | End: 2022-06-09 | Stop reason: HOSPADM

## 2022-06-08 RX ORDER — LIDOCAINE HYDROCHLORIDE 20 MG/ML
INJECTION, SOLUTION INFILTRATION; PERINEURAL PRN
Status: DISCONTINUED | OUTPATIENT
Start: 2022-06-08 | End: 2022-06-08

## 2022-06-08 RX ORDER — ONDANSETRON 2 MG/ML
4 INJECTION INTRAMUSCULAR; INTRAVENOUS
Status: DISCONTINUED | OUTPATIENT
Start: 2022-06-08 | End: 2022-06-08 | Stop reason: HOSPADM

## 2022-06-08 RX ORDER — PROCHLORPERAZINE MALEATE 10 MG
10 TABLET ORAL EVERY 6 HOURS PRN
Status: DISCONTINUED | OUTPATIENT
Start: 2022-06-08 | End: 2022-06-09 | Stop reason: HOSPADM

## 2022-06-08 RX ADMIN — PROPOFOL 50 MG: 10 INJECTION, EMULSION INTRAVENOUS at 08:24

## 2022-06-08 RX ADMIN — LIDOCAINE HYDROCHLORIDE 100 MG: 20 INJECTION, SOLUTION INFILTRATION; PERINEURAL at 08:24

## 2022-06-08 RX ADMIN — SODIUM CHLORIDE, SODIUM LACTATE, POTASSIUM CHLORIDE, CALCIUM CHLORIDE: 600; 310; 30; 20 INJECTION, SOLUTION INTRAVENOUS at 08:19

## 2022-06-08 RX ADMIN — PROPOFOL 150 MCG/KG/MIN: 10 INJECTION, EMULSION INTRAVENOUS at 08:24

## 2022-06-08 NOTE — ANESTHESIA PREPROCEDURE EVALUATION
Anesthesia Pre-Procedure Evaluation    Patient: Berta Nava   MRN: [id number] : 1965        Procedure : Procedure(s):  COLONOSCOPY          Past Medical History:   Diagnosis Date     Ascites      Biliary cirrhosis (H)      Cholangitis, sclerosing      Cirrhosis of liver with ascites (H) 3/3/2021     Hearing loss of left ear     wears a hearing aide     History of low potassium      Hyperlipidemia      Hypertension      Liver transplant rejection (H) 2022    Mild Rejection     SBP (spontaneous bacterial peritonitis) (H) 2021     Sjogren's syndrome (H)      Ulcerative colitis (H)      Ulcerative pancolitis (H)       Past Surgical History:   Procedure Laterality Date     BENCH LIVER N/A 2021    Procedure: Bench liver;  Surgeon: Ernesto Schmitt MD;  Location:  OR     COMBINED ESOPHAGOSCOPY, GASTROSCOPY, DUODENOSCOPY (EGD), REMOVE BILIARY STENT N/A 2022    Procedure: ESOPHAGOGASTRODUODENOSCOPY, WITH BILIARY STENT REMOVAL;  Surgeon: Leventhal, Thomas Michael, MD;  Location: [facility] OR     CV CORONARY ANGIOGRAM N/A 2021    Procedure: Coronary Angiogram with possible intervention;  Surgeon: David Wilhelm MD;  Location:  HEART CARDIAC CATH LAB     ENDOSCOPIC RETROGRADE CHOLANGIOPANCREATOGRAM N/A 2021    Procedure: ENDOSCOPIC RETROGRADE CHOLANGIOPANCREATOGRAPHY with ballon sweep of bile ducts for stones, balloon dilation of bile ducts and bile duct stent placement;  Surgeon: Gregory Gabriel MD;  Location:  OR     ENDOSCOPIC RETROGRADE CHOLANGIOPANCREATOGRAM N/A 2021    Procedure: ENDOSCOPIC RETROGRADE CHOLANGIOPANCREATOGRAPHY STONE REMOVAL, DILATION AND STENT PLACEMENT;  Surgeon: Gregory Gabriel MD;  Location:  OR     ENDOSCOPIC RETROGRADE CHOLANGIOPANCREATOGRAPHY, EXCHANGE TUBE/STENT N/A 2021    Procedure: ENDOSCOPIC RETROGRADE CHOLANGIOPANCREATOGRAPHY WITH STENT EXCHANGE, STONE EXTRACTION, AND DILATION;  Surgeon: Gregory Gabriel MD;  Location:  OR      ENDOSCOPIC RETROGRADE CHOLANGIOPANCREATOGRAPHY, EXCHANGE TUBE/STENT N/A 5/10/2021    Procedure: ENDOSCOPIC RETROGRADE CHOLANGIOPANCREATOGRAPHY with biliary dilation, stone removal, stent exchange;  Surgeon: Gregory Gabriel MD;  Location: [facility] OR     ENDOSCOPIC RETROGRADE CHOLANGIOPANCREATOGRAPHY, EXCHANGE TUBE/STENT N/A 6/10/2021    Procedure: ENDOSCOPIC RETROGRADE CHOLANGIOPANCREATOGRAPHY, WITH biliary stent exchange, stone removal;  Surgeon: Woodrow Lott MD;  Location: [facility] OR     ENDOSCOPIC RETROGRADE CHOLANGIOPANCREATOGRAPHY, EXCHANGE TUBE/STENT N/A 2021    Procedure: ENDOSCOPIC RETROGRADE CHOLANGIOPANCREATOGRAPHY with biliary dilation, stone removal, stent exchange;  Surgeon: Gregory Gabriel MD;  Location: [facility] OR     ENDOSCOPIC RETROGRADE CHOLANGIOPANCREATOGRAPHY, EXCHANGE TUBE/STENT N/A 10/1/2021    Procedure: ENDOSCOPIC RETROGRADE CHOLANGIOPANCREATOGRAPHY with balloon sweep of bile ducts, bile duct stent exchanged;  Surgeon: Gregory Garbiel MD;  Location: [facility] OR     ESOPHAGOSCOPY, GASTROSCOPY, DUODENOSCOPY (EGD), COMBINED N/A 2021    Procedure: ESOPHAGOGASTRODUODENOSCOPY (EGD);  Surgeon: Leventhal, Thomas Michael, MD;  [facility]     ESOPHAGOSCOPY, GASTROSCOPY, DUODENOSCOPY (EGD), COMBINED N/A 10/1/2021    Procedure: ESOPHAGOGASTRODUODENOSCOPY (EGD) with varices banding;  Surgeon: Gregory Gabriel MD;  Location: [facility] OR     GYN SURGERY      bilat fallopian tubes and ovaries removed     IR LIVER BIOPSY PERCUTANEOUS  2022     PICC DOUBLE LUMEN PLACEMENT Right 2021    42cm (2cm external), Lateral brachial vein     RETURN LIVER TRANSPLANT N/A 2021    Procedure: CLOSURE, WOUND, ABDOMEN, SECONDARY, ABDOMINAL WASHOUT;  Surgeon: Ernesto Schmitt MD;  Location: [facility] OR     TRANSPLANT LIVER RECIPIENT  DONOR N/A 2021    Procedure: Opening of abdomen, Abdominal Exploration and aborted liver transplant.;  Surgeon: Ernesto Schmitt MD;  Location: [facility] OR     TRANSPLANT LIVER  RECIPIENT  DONOR N/A 2021    Procedure: TRANSPLANT, LIVER, RECIPIENT,  DONOR;  Surgeon: Ernesto Schmitt MD;  Location: [facility] OR      Allergies   Allergen Reactions     Diagnostic X-Ray Materials Hives     PATIENT HAD HIVE REACTION AFTER ADMINISTERING CT CONTRAST DYE.       Contrast Dye       Social History     Tobacco Use     Smoking status: Never Smoker     Smokeless tobacco: Never Used   Substance Use Topics     Alcohol use: No     Comment: Last drink was 2017      Wt Readings from Last 1 Encounters:   22 70.3 kg (155 lb)        Anesthesia Evaluation   Pt has had prior anesthetic. Type: General and MAC.        ROS/MED HX  ENT/Pulmonary:  - neg pulmonary ROS     Neurologic:  - neg neurologic ROS     Cardiovascular:       METS/Exercise Tolerance:     Hematologic:     (+) anemia, history of blood transfusion, no previous transfusion reaction, Known PRBC Anitbodies:No     Musculoskeletal:   (+) arthritis,     GI/Hepatic:       Renal/Genitourinary:       Endo:     (+) Chronic steroid usage for Post Transplant Immunosuppression.     Psychiatric/Substance Use:     (+) alcohol abuse     Infectious Disease:  - neg infectious disease ROS     Malignancy:       Other:            Physical Exam    Airway        Mallampati: II   TM distance: > 3 FB   Neck ROM: full   Mouth opening: > 3 cm    Respiratory Devices and Support         Dental  no notable dental history         Cardiovascular             Pulmonary   pulmonary exam normal                OUTSIDE LABS:  CBC:   Lab Results   Component Value Date    WBC 3.7 (L) 2022    WBC 3.7 (L) 2022    HGB 11.4 (L) 2022    HGB 11.8 2022    HCT 33.5 (L) 2022    HCT 34.2 (L) 2022     (L) 2022     (L) 2022     BMP:   Lab Results   Component Value Date     2022     2022    POTASSIUM 3.8 2022    POTASSIUM 4.3 2022    CHLORIDE 114 (H) 2022    CHLORIDE 114 (H)  05/23/2022    CO2 22 06/06/2022    CO2 25 05/23/2022    BUN 26 06/06/2022    BUN 21 05/23/2022    CR 0.78 06/06/2022    CR 0.80 05/23/2022    GLC 87 06/06/2022    GLC 87 05/23/2022     COAGS:   Lab Results   Component Value Date    PTT 36 12/03/2021    INR 1.09 05/17/2022    FIBR 465 11/22/2021     POC:   Lab Results   Component Value Date    BGM 95 06/25/2021     HEPATIC:   Lab Results   Component Value Date    ALBUMIN 3.8 06/06/2022    PROTTOTAL 7.6 06/06/2022    ALT 59 (H) 06/06/2022    AST 31 06/06/2022    ALKPHOS 73 06/06/2022    BILITOTAL 0.4 06/06/2022    MIHIR 26 11/19/2021     OTHER:   Lab Results   Component Value Date    PH 7.48 (H) 11/20/2021    LACT 0.6 (L) 12/03/2021    A1C 5.4 11/19/2021    MARIELENA 9.0 06/06/2022    PHOS 4.4 06/06/2022    MAG 1.8 06/06/2022    LIPASE 155 12/03/2021    AMYLASE 36 11/19/2021    CRP 54.0 (H) 11/17/2021       Anesthesia Plan    ASA Status:  3   NPO Status:  NPO Appropriate    Anesthesia Type: MAC.     - Reason for MAC: straight local not clinically adequate   Induction: Intravenous, Propofol.   Maintenance: TIVA.        Consents    Anesthesia Plan(s) and associated risks, benefits, and realistic alternatives discussed. Questions answered and patient/representative(s) expressed understanding.    - Discussed:     - Discussed with:  Patient      - Extended Intubation/Ventilatory Support Discussed: No.      - Patient is DNR/DNI Status: No    Use of blood products discussed: No .     Postoperative Care    Pain management: Multi-modal analgesia.   PONV prophylaxis: Ondansetron (or other 5HT-3), Background Propofol Infusion     Comments:                Ruben Hagen MD

## 2022-06-08 NOTE — INTERVAL H&P NOTE
"I have reviewed the surgical (or preoperative) H&P that is linked to this encounter, and examined the patient. There are no significant changes    Clinical Conditions Present on Arrival:  Clinically Significant Risk Factors Present on Admission                  # Platelet Defect: home medication list includes an antiplatelet medication  # Overweight: Estimated body mass index is 25.02 kg/m  as calculated from the following:    Height as of this encounter: 1.676 m (5' 6\").    Weight as of this encounter: 70.3 kg (155 lb).       "

## 2022-06-08 NOTE — ANESTHESIA CARE TRANSFER NOTE
Patient: Berta Nava    Procedure: Procedure(s):  COLONOSCOPY, WITH BIOPSY       Diagnosis: Ulcerative pancolitis with complication (H) [K51.019]  Diagnosis Additional Information: No value filed.    Anesthesia Type:   MAC     Note:    Oropharynx: spontaneously breathing  Level of Consciousness: awake  Oxygen Supplementation: room air    Independent Airway: airway patency satisfactory and stable  Dentition: dentition unchanged  Vital Signs Stable: post-procedure vital signs reviewed and stable  Report to RN Given: handoff report given  Patient transferred to: Phase II    Handoff Report: Identifed the Patient, Identified the Reponsible Provider, Reviewed the pertinent medical history, Discussed the surgical course, Reviewed Intra-OP anesthesia mangement and issues during anesthesia, Set expectations for post-procedure period and Allowed opportunity for questions and acknowledgement of understanding      Vitals:  Vitals Value Taken Time   BP 98/56 06/08/22 0850   Temp 36.7  C (98  F) 06/08/22 0850   Pulse 79 06/08/22 0850   Resp 12 06/08/22 0850   SpO2 100 % 06/08/22 0850       Electronically Signed By: CHELSEY Pitt CRNA  June 8, 2022  8:56 AM

## 2022-06-08 NOTE — H&P
Berta Nava  [id number]  female  56 year old      Reason for procedure/surgery: colonoscopy for UC surveillance    Patient Active Problem List   Diagnosis     Cholangitis     Hypokalemia     Essential hypertension     Hyperlipidemia     Mixed hearing loss, bilateral     Neoplasm of uncertain behavior of skin     PSC (primary sclerosing cholangitis)     Sicca syndrome (H)     Ulcerative pancolitis with complication (H)     Cirrhosis of liver with ascites (H)     Choledocholithiasis     Secondary esophageal varices without bleeding (H)     Primary sclerosing cholangitis     Liver transplant planned     Acute kidney failure, unspecified (H)     Severe protein-calorie malnutrition (H)     Encephalopathy     Hyperammonemia (H)     Hepatic encephalopathy (H)     Chronic liver failure without hepatic coma (H)     Immunosuppressed status (H)     S/P liver transplant (H)     Anemia due to chronic kidney disease     Liver transplant rejection (H)       Past Surgical History:    Past Surgical History:   Procedure Laterality Date     BENCH LIVER N/A 11/18/2021    Procedure: Bench liver;  Surgeon: Ernesto Schmitt MD;  [facility]     COMBINED ESOPHAGOSCOPY, GASTROSCOPY, DUODENOSCOPY (EGD), REMOVE BILIARY STENT N/A 2/9/2022    Procedure: ESOPHAGOGASTRODUODENOSCOPY, WITH BILIARY STENT REMOVAL;  Surgeon: Leventhal, Thomas Michael, MD;  [place] [facility] [place]     [place] CORONARY ANGIOGRAM N/A 8/25/2021    Procedure: Coronary Angiogram with possible intervention;  Surgeon: David Wilhelm MD;  Location:  [facility]     ENDOSCOPIC RETROGRADE CHOLANGIOPANCREATOGRAM N/A 6/25/2021    Procedure: ENDOSCOPIC RETROGRADE CHOLANGIOPANCREATOGRAPHY with ballon sweep of bile ducts for stones, balloon dilation of bile ducts and bile duct stent placement;  Surgeon: Gregory Gabriel MD;  [facility]     ENDOSCOPIC RETROGRADE CHOLANGIOPANCREATOGRAM N/A 7/22/2021    Procedure: ENDOSCOPIC RETROGRADE CHOLANGIOPANCREATOGRAPHY  STONE REMOVAL, DILATION AND STENT PLACEMENT;  Surgeon: Gregory Gabriel MD;  Location:  OR     ENDOSCOPIC RETROGRADE CHOLANGIOPANCREATOGRAPHY, EXCHANGE TUBE/STENT N/A 05/05/2021    Procedure: ENDOSCOPIC RETROGRADE CHOLANGIOPANCREATOGRAPHY WITH STENT EXCHANGE, STONE EXTRACTION, AND DILATION;  Surgeon: Gregory Gabriel MD;  [facility]     ENDOSCOPIC RETROGRADE CHOLANGIOPANCREATOGRAPHY, EXCHANGE TUBE/STENT N/A 5/10/2021    Procedure: ENDOSCOPIC RETROGRADE CHOLANGIOPANCREATOGRAPHY with biliary dilation, stone removal, stent exchange;  Surgeon: Gregory Gabriel MD;  [facility]     ENDOSCOPIC RETROGRADE CHOLANGIOPANCREATOGRAPHY, EXCHANGE TUBE/STENT N/A 6/10/2021    Procedure: ENDOSCOPIC RETROGRADE CHOLANGIOPANCREATOGRAPHY, WITH biliary stent exchange, stone removal;  Surgeon: Woodrow Lott MD;  [facility]     ENDOSCOPIC RETROGRADE CHOLANGIOPANCREATOGRAPHY, EXCHANGE TUBE/STENT N/A 8/30/2021    Procedure: ENDOSCOPIC RETROGRADE CHOLANGIOPANCREATOGRAPHY with biliary dilation, stone removal, stent exchange;  Surgeon: Gregory Gabriel MD;  [facility]     ENDOSCOPIC RETROGRADE CHOLANGIOPANCREATOGRAPHY, EXCHANGE TUBE/STENT N/A 10/1/2021    Procedure: ENDOSCOPIC RETROGRADE CHOLANGIOPANCREATOGRAPHY with balloon sweep of bile ducts, bile duct stent exchanged;  Surgeon: Gregory Gabriel MD;  Location:  OR     ESOPHAGOSCOPY, GASTROSCOPY, DUODENOSCOPY (EGD), COMBINED N/A 9/4/2021    Procedure: ESOPHAGOGASTRODUODENOSCOPY (EGD);  Surgeon: Leventhal, Thomas Michael, MD;  Location:  GI     ESOPHAGOSCOPY, GASTROSCOPY, DUODENOSCOPY (EGD), COMBINED N/A 10/1/2021    Procedure: ESOPHAGOGASTRODUODENOSCOPY (EGD) with varices banding;  Surgeon: Gregory Gabriel MD;  [facility]     GYN SURGERY      bilat fallopian tubes and ovaries removed     IR LIVER BIOPSY PERCUTANEOUS  5/17/2022     PICC DOUBLE LUMEN PLACEMENT Right 05/08/2021    42cm (2cm external), Lateral brachial vein     RETURN LIVER TRANSPLANT N/A 11/19/2021     Procedure: CLOSURE, WOUND, ABDOMEN, SECONDARY, ABDOMINAL WASHOUT;  Surgeon: Ernesto Schmitt MD;  [facility]     TRANSPLANT LIVER RECIPIENT  DONOR N/A 2021    Procedure: Opening of abdomen, Abdominal Exploration and aborted liver transplant.;  Surgeon: Ernesto Schmitt MD;  [facility]     TRANSPLANT LIVER RECIPIENT  DONOR N/A 2021    Procedure: TRANSPLANT, LIVER, RECIPIENT,  DONOR;  Surgeon: Ernesto Schmitt MD;  [facility]       Past Medical History:   Past Medical History:   Diagnosis Date     Ascites      Biliary cirrhosis (H)      Cholangitis, sclerosing      Cirrhosis of liver with ascites (H) 3/3/2021     Hearing loss of left ear     wears a hearing aide     History of low potassium      Hyperlipidemia      Hypertension      Liver transplant rejection (H) 2022    Mild Rejection     SBP (spontaneous bacterial peritonitis) (H) 2021     Sjogren's syndrome (H)      Ulcerative colitis (H)      Ulcerative pancolitis (H)        Social History:   Social History     Tobacco Use     Smoking status: Never Smoker     Smokeless tobacco: Never Used   Substance Use Topics     Alcohol use: No     Comment: Last drink was        Family History:   Family History   Problem Relation Age of Onset     Cancer Mother         angiosarcoma     Coronary Artery Disease Early Onset Father         MI age 46     Heart Transplant Father      Liver Disease No family hx of      Ulcerative Colitis No family hx of      Crohn's Disease No family hx of        Allergies:   Allergies   Allergen Reactions     Diagnostic X-Ray Materials Hives     PATIENT HAD HIVE REACTION AFTER ADMINISTERING CT CONTRAST DYE.       Contrast Dye        Active Medications:   Current Outpatient Medications   Medication Sig Dispense Refill     amLODIPine (NORVASC) 5 MG tablet TAKE 2 TABLETS(10 MG) BY MOUTH DAILY 180 tablet 3     aspirin (ASA) 81 MG EC tablet Take 1 tablet (81 mg) by mouth daily    "    multivitamin (CENTRUM SILVER) tablet Take 1 tablet by mouth daily       mycophenolate (GENERIC EQUIVALENT) 250 MG capsule Take 3 capsules (750 mg) by mouth every 12 hours 180 capsule 11     predniSONE (DELTASONE) 5 MG tablet Take 1 tablet (5 mg) by mouth daily 90 tablet 3     tacrolimus (GENERIC EQUIVALENT) 1 MG capsule Take 3 capsules (3 mg) by mouth every 12 hours 340 capsule 3     ursodiol (ACTIGALL) 300 MG capsule Take 1 capsule (300 mg) by mouth 2 times daily 180 capsule 3       Systemic Review:   CONSTITUTIONAL: NEGATIVE for fever, chills, change in weight  ENT/MOUTH: NEGATIVE for ear, mouth and throat problems  RESP: NEGATIVE for significant cough or SOB  CV: NEGATIVE for chest pain, palpitations or peripheral edema    Physical Examination:   Vital Signs: BP 98/63 (BP Location: Right arm)   Pulse 95   Temp 96.9  F (36.1  C) (Temporal)   Resp 18   Ht 1.676 m (5' 6\")   Wt 70.3 kg (155 lb)   LMP  (LMP Unknown)   SpO2 100%   BMI 25.02 kg/m    GENERAL: healthy, alert and no distress  NECK: no adenopathy, no asymmetry, masses, or scars  RESP: lungs clear to auscultation - no rales, rhonchi or wheezes  CV: regular rate and rhythm, normal S1 S2, no S3 or S4, no murmur, click or rub, no peripheral edema and peripheral pulses strong  ABDOMEN: soft, nontender, no hepatosplenomegaly, no masses and bowel sounds normal  MS: no gross musculoskeletal defects noted, no edema    Plan: Appropriate to proceed as scheduled.      Thomas M. Leventhal, MD  6/8/2022    PCP:  Tila Nevarez    "

## 2022-06-08 NOTE — ANESTHESIA POSTPROCEDURE EVALUATION
Patient: Berta Nava    Procedure: Procedure(s):  COLONOSCOPY, WITH BIOPSY       Anesthesia Type:  MAC    Note:  Disposition: Outpatient   Postop Pain Control: Uneventful            Sign Out: Well controlled pain   PONV:    Neuro/Psych: Uneventful            Sign Out: Acceptable/Baseline neuro status   Airway/Respiratory: Uneventful            Sign Out: Acceptable/Baseline resp. status   CV/Hemodynamics: Uneventful            Sign Out: Acceptable CV status; No obvious hypovolemia; No obvious fluid overload   Other NRE:    DID A NON-ROUTINE EVENT OCCUR?            Last vitals:  Vitals Value Taken Time   /62 06/08/22 0915   Temp 36.7  C (98  F) 06/08/22 0915   Pulse 79 06/08/22 0915   Resp 12 06/08/22 0915   SpO2 100 % 06/08/22 0915       Electronically Signed By: Ruben Hagen MD  June 8, 2022  3:37 PM

## 2022-06-10 ENCOUNTER — TELEPHONE (OUTPATIENT)
Dept: INFECTIOUS DISEASES | Facility: CLINIC | Age: 57
End: 2022-06-10

## 2022-06-10 ENCOUNTER — TELEPHONE (OUTPATIENT)
Dept: TRANSPLANT | Facility: CLINIC | Age: 57
End: 2022-06-10

## 2022-06-10 DIAGNOSIS — B25.9 CYTOMEGALOVIRUS INFECTION (H): Primary | ICD-10-CM

## 2022-06-10 DIAGNOSIS — K51.90 ULCERATIVE COLITIS (H): ICD-10-CM

## 2022-06-10 PROCEDURE — 88305 TISSUE EXAM BY PATHOLOGIST: CPT | Mod: 26 | Performed by: PATHOLOGY

## 2022-06-10 PROCEDURE — 88341 IMHCHEM/IMCYTCHM EA ADD ANTB: CPT | Mod: 26 | Performed by: PATHOLOGY

## 2022-06-10 PROCEDURE — 88342 IMHCHEM/IMCYTCHM 1ST ANTB: CPT | Mod: 26 | Performed by: PATHOLOGY

## 2022-06-10 NOTE — TELEPHONE ENCOUNTER
Spoke to Berta, she will call to make appt w/ shelli Gloevr available and Dr. Leventhal in 3mos.  I told her I will call to make the appt with transplant ID. Spoke to , first available is July 12.  I told them this isn't acceptable as she has tissue invasive disease. She will contact the team and see what they can come up with.

## 2022-06-10 NOTE — TELEPHONE ENCOUNTER
Staff message from Dr. Leventhal re: recent colonosc which shows tissue invasive CMV:    Leventhal, Thomas Michael, MD sent to Vanessa Durant RN  Okay   - Please let her know that her colonscopy did show active disease and we will be referring to an IBD specialist (please place GI referral to be seen by Dr. Crawford)   - Please place urgent consult to transplant ID for management of tissue invasive CMV

## 2022-06-10 NOTE — TELEPHONE ENCOUNTER
Health Call Center    Phone Message    May a detailed message be left on voicemail: yes     Reason for Call: Appointment Intake    Referring Provider Name: Leventhal, Thomas Michael, MD in  [facility]    Diagnosis and/or Symptoms: Cytomegalovirus infection (H) [B25.9]  Notes:  post liver transplant patient w/ recent colonoscopy showing invasive CMV    Vanessa from Transplant office called to coordinate appt for visit.  Previous pt of [facility], referring provider requesting transplant provider.  Next available on 7/12/22.  Vanessa declined scheduling and requested message to be sent.    Please review and call pt to coordinate.  Call Vanessa with any questions @ [phone].    Action Taken: Message routed to:  [facility] ID    Travel Screening: Not Applicable

## 2022-06-13 PROBLEM — E43 SEVERE PROTEIN-CALORIE MALNUTRITION (H): Status: RESOLVED | Noted: 2021-09-27 | Resolved: 2022-06-13

## 2022-06-13 PROBLEM — G93.40 ENCEPHALOPATHY: Status: RESOLVED | Noted: 2021-10-25 | Resolved: 2022-06-13

## 2022-06-13 PROBLEM — K80.50 CHOLEDOCHOLITHIASIS: Status: RESOLVED | Noted: 2021-04-30 | Resolved: 2022-06-13

## 2022-06-13 PROBLEM — K72.10 CHRONIC LIVER FAILURE WITHOUT HEPATIC COMA (H): Status: RESOLVED | Noted: 2021-11-17 | Resolved: 2022-06-13

## 2022-06-13 PROBLEM — K74.60 CIRRHOSIS OF LIVER WITH ASCITES (H): Status: RESOLVED | Noted: 2021-03-03 | Resolved: 2022-06-13

## 2022-06-13 PROBLEM — K76.82 HEPATIC ENCEPHALOPATHY (H): Status: RESOLVED | Noted: 2021-11-17 | Resolved: 2022-06-13

## 2022-06-13 PROBLEM — E72.20 HYPERAMMONEMIA (H): Status: RESOLVED | Noted: 2021-11-06 | Resolved: 2022-06-13

## 2022-06-13 PROBLEM — R18.8 CIRRHOSIS OF LIVER WITH ASCITES (H): Status: RESOLVED | Noted: 2021-03-03 | Resolved: 2022-06-13

## 2022-06-14 ENCOUNTER — OFFICE VISIT (OUTPATIENT)
Dept: INFECTIOUS DISEASES | Facility: CLINIC | Age: 57
End: 2022-06-14
Attending: INTERNAL MEDICINE
Payer: COMMERCIAL

## 2022-06-14 VITALS
HEART RATE: 94 BPM | WEIGHT: 158 LBS | DIASTOLIC BLOOD PRESSURE: 88 MMHG | OXYGEN SATURATION: 99 % | SYSTOLIC BLOOD PRESSURE: 152 MMHG | BODY MASS INDEX: 25.5 KG/M2

## 2022-06-14 DIAGNOSIS — B25.9 CMV COLITIS (H): Primary | ICD-10-CM

## 2022-06-14 DIAGNOSIS — Z94.4 LIVER REPLACED BY TRANSPLANT (H): ICD-10-CM

## 2022-06-14 DIAGNOSIS — Z91.89 AT RISK FOR ADVERSE DRUG EVENT: ICD-10-CM

## 2022-06-14 DIAGNOSIS — A08.39 CMV COLITIS (H): Primary | ICD-10-CM

## 2022-06-14 PROCEDURE — 36415 COLL VENOUS BLD VENIPUNCTURE: CPT | Performed by: INTERNAL MEDICINE

## 2022-06-14 PROCEDURE — 99417 PROLNG OP E/M EACH 15 MIN: CPT | Performed by: INTERNAL MEDICINE

## 2022-06-14 PROCEDURE — 99215 OFFICE O/P EST HI 40 MIN: CPT | Performed by: INTERNAL MEDICINE

## 2022-06-14 RX ORDER — VALGANCICLOVIR 450 MG/1
900 TABLET, FILM COATED ORAL 2 TIMES DAILY
Qty: 360 TABLET | Refills: 0 | Status: SHIPPED | OUTPATIENT
Start: 2022-06-14 | End: 2022-07-21

## 2022-06-14 ASSESSMENT — ENCOUNTER SYMPTOMS
DIARRHEA: 0
BLOOD IN STOOL: 0
RECTAL PAIN: 0
CONSTIPATION: 1
VOMITING: 0
JAUNDICE: 0
NAUSEA: 1
BLOATING: 0
HEARTBURN: 0
ABDOMINAL PAIN: 0
BOWEL INCONTINENCE: 0

## 2022-06-14 ASSESSMENT — PAIN SCALES - GENERAL: PAINLEVEL: NO PAIN (0)

## 2022-06-14 NOTE — NURSING NOTE
Chief Complaint   Patient presents with     Consult     Initial clinic visit for CMV     Blood pressure (!) 152/88, pulse 94, weight 71.7 kg (158 lb), SpO2 99 %, not currently breastfeeding.    Provider is aware of elevated blood pressure and informed to let CMA know if they would like a recheck.    Jacinta Orta, ANGELITA

## 2022-06-14 NOTE — PROGRESS NOTES
[facility]    Transplant Infectious Diseases Outpatient Consultation     Patient:  Berta Nava, Date of birth 1965, Medical record number [id number]  Date of Visit:  06/14/2022  Consult requested by Dr. Leventhal for evaluation of CMV colitis.           Recommendations:   1. VGCV 900 mg bid for at least 4 weeks.   2. CMV PCR today.   3. Weekly CBC with diff and CMP.   4. Will discuss with hepatology/GI if we can repeat colonoscopy in 4-6 weeks to assess the cecal ulcer and response to therapy.   5. Is there a room to decrease IS therapy. I do appreciate that the patient had recent ACR, so if there is no room to decrease IS therapy I understand.    6. Once the IS therapy is decreased after treating the current ACR, will address the need for the COVID-19 booster vaccine, the shingrix vaccine and the pneumonia vaccine.     RTC: 6-8 weeks        Summary of Presentation:   Transplants:  11/18/2021 (Liver), Postoperative day:  208     This patient is a 56 year old female with PBC-liver cirrhosis s/p OLT in 11/2021 on TAC/MMF/prednisone.   History of UC with screening colonoscopy showing an evidence of CMV colitis.         Active Problems and Infectious Diseases Issues:   1. CMV colitis.   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     Since this patient's IS therapy was recently escalated, I think we ought to treat since this primary CMV infection will likely get worse without therapy.   Typically, it is important to decrease IS therapy to help with the CMV therapy. If this is not possible due to ACR, I understand.     The patient is asymptomatic with no fever or diarrhea, so the only way to assess response to therapy is by repeating colonoscopy in 4-6 weeks. However, the ulcer is very small (2mm) and it may not be feasible to repeat colonoscopy; will discuss with GI.     Will check CMV PCR.   Will check weekly CBC with diff given the myelosuppressive effect of  VGCV.         Old Problems and Infectious Diseases Issues:   1. SBP and BSI with S mitis 4/29/2021; on IV ABx since 4/29/2021 for possible association with liver abscess.    2. Indeterminate QuantiFeron due to low reaction to mitogen likely due to the patient's underlying liver disease. This patient is low risk for TB, CXR unremarkable; no further evaluations were deemed necessary.     Other Infectious Disease issues include:  - QTc: 465 as of 11/2021.   - PCP prophylaxis: bactrim until 4/24/2022 (5-6 months).   - Serostatus: CMV D+/R-, EBV D+/R+, HSV1?/2?, VZV ?, received VGCV for 5-6 months until 4/24/2022.   - Immunization status: This patient received the third dose of the COVID-19 vaccine on 3/21/2022, she also received evusheld on 5/31/2022. Otherwise she needs to repeat the shingrix vaccine series since she only received onde dose on 6/21/2021. Will need conjugated pneumonia vaccine.    - Gamma globulin status: ?      Thank you Dr. Leventhal for your kind consultation, and for involving me in the care of Mrs. Berta Nava. Please do not hesitate to call me for any question.     Attestation:  Total duration of visit including chart review, reviewing labs and imaging, interviewing and examining the patient, documentation, and sending communication to the patient and to the primary treating team, all at the same day of this encounter, is: 90 minutes.   Forest Dasilva MD    Contact information available via [facility] Paging/Directory     06/14/2022         History of the Infectious Disease lllness:       Transplants:  11/18/2021 (Liver); Postoperative day:  208    55 yo female with PBC-liver cirrhosis s/p OLT in 11/2021. Also history of UC for which she undergoes screening colonoscopy.   During the last screening colonoscopy on 6/8/2022, she was found to have a single, 2mm cecal ulcer. The biopsy was c/w CMV infection.   The patient has/had no fever, diarrhea, N/V or abdominal pain.     The patient had mild ACR  on a liver biopsy 5/2022, MMF was readminister and TAC/prednisone were maintained.     Exposure History:  Lives with her  in a single family house [place] the [place] area.   Does some gardening but wear gloves.   Traveled to [facility] in the past.   The only international travel was to [place], to a resort only.   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   Has two grown up children, do not live with her.   Has a dog.          Review of Systems:   As mentioned in the interim history otherwise negative by reviewing constitutional symptoms, central and peripheral neurological systems, respiratory system, cardiac system, GI system,  system, musculoskeletal, skin, allergy, and lymphatics.           Past Medical History:     Past Medical History:   Diagnosis Date     Ascites      Biliary cirrhosis (H)      Cholangitis, sclerosing      Cirrhosis of liver with ascites (H) 3/3/2021     Hearing loss of left ear     wears a hearing aide     History of low potassium      Hyperlipidemia      Hypertension      Liver transplant rejection (H) 5/17/2022    Mild Rejection     SBP (spontaneous bacterial peritonitis) (H) 4/30/2021     Sjogren's syndrome (H)      Ulcerative colitis (H)      Ulcerative pancolitis (H)             Past Surgical History:     Past Surgical History:   Procedure Laterality Date     BENCH LIVER N/A 11/18/2021    Procedure: Bench liver;  Surgeon: Ernesto Schmitt MD;  Location:  OR     COLONOSCOPY N/A 6/8/2022    Procedure: COLONOSCOPY, WITH BIOPSY;  Surgeon: Leventhal, Thomas Michael, MD;  [place] [facility] [place]     COMBINED ESOPHAGOSCOPY, GASTROSCOPY, DUODENOSCOPY (EGD), REMOVE BILIARY STENT N/A 2/9/2022    Procedure: ESOPHAGOGASTRODUODENOSCOPY, WITH BILIARY STENT REMOVAL;  Surgeon: Leventhal, Thomas Michael, MD;  [place] [facility] [place]     CV CORONARY ANGIOGRAM N/A 8/25/2021    Procedure: Coronary Angiogram with possible intervention;  Surgeon: David Wilhelm MD;  Location:  HEART CARDIAC CATH LAB      ENDOSCOPIC RETROGRADE CHOLANGIOPANCREATOGRAM N/A 6/25/2021    Procedure: ENDOSCOPIC RETROGRADE CHOLANGIOPANCREATOGRAPHY with ballon sweep of bile ducts for stones, balloon dilation of bile ducts and bile duct stent placement;  Surgeon: Gregory Gabriel MD;  Location: [facility] OR     ENDOSCOPIC RETROGRADE CHOLANGIOPANCREATOGRAM N/A 7/22/2021    Procedure: ENDOSCOPIC RETROGRADE CHOLANGIOPANCREATOGRAPHY STONE REMOVAL, DILATION AND STENT PLACEMENT;  Surgeon: Gregory Gabriel MD;  Location: [facility] OR     ENDOSCOPIC RETROGRADE CHOLANGIOPANCREATOGRAPHY, EXCHANGE TUBE/STENT N/A 05/05/2021    Procedure: ENDOSCOPIC RETROGRADE CHOLANGIOPANCREATOGRAPHY WITH STENT EXCHANGE, STONE EXTRACTION, AND DILATION;  Surgeon: Gregory Gabriel MD;  Location: [facility] OR     ENDOSCOPIC RETROGRADE CHOLANGIOPANCREATOGRAPHY, EXCHANGE TUBE/STENT N/A 5/10/2021    Procedure: ENDOSCOPIC RETROGRADE CHOLANGIOPANCREATOGRAPHY with biliary dilation, stone removal, stent exchange;  Surgeon: Gregory Gabriel MD;  Location: [facility] OR     ENDOSCOPIC RETROGRADE CHOLANGIOPANCREATOGRAPHY, EXCHANGE TUBE/STENT N/A 6/10/2021    Procedure: ENDOSCOPIC RETROGRADE CHOLANGIOPANCREATOGRAPHY, WITH biliary stent exchange, stone removal;  Surgeon: Woodrow Lott MD;  Location: [facility] OR     ENDOSCOPIC RETROGRADE CHOLANGIOPANCREATOGRAPHY, EXCHANGE TUBE/STENT N/A 8/30/2021    Procedure: ENDOSCOPIC RETROGRADE CHOLANGIOPANCREATOGRAPHY with biliary dilation, stone removal, stent exchange;  Surgeon: Gregory Gabriel MD;  Location: [facility] OR     ENDOSCOPIC RETROGRADE CHOLANGIOPANCREATOGRAPHY, EXCHANGE TUBE/STENT N/A 10/1/2021    Procedure: ENDOSCOPIC RETROGRADE CHOLANGIOPANCREATOGRAPHY with balloon sweep of bile ducts, bile duct stent exchanged;  Surgeon: Gregory Gabriel MD;  Location: [facility] OR     ESOPHAGOSCOPY, GASTROSCOPY, DUODENOSCOPY (EGD), COMBINED N/A 9/4/2021    Procedure: ESOPHAGOGASTRODUODENOSCOPY (EGD);  Surgeon: Leventhal, Thomas Michael, MD;  Location:  GI     ESOPHAGOSCOPY, GASTROSCOPY,  DUODENOSCOPY (EGD), COMBINED N/A 10/1/2021    Procedure: ESOPHAGOGASTRODUODENOSCOPY (EGD) with varices banding;  Surgeon: Gregory Gabriel MD;  Location: [facility] OR     GYN SURGERY      bilat fallopian tubes and ovaries removed     IR LIVER BIOPSY PERCUTANEOUS  2022     PICC DOUBLE LUMEN PLACEMENT Right 2021    42cm (2cm external), Lateral brachial vein     RETURN LIVER TRANSPLANT N/A 2021    Procedure: CLOSURE, WOUND, ABDOMEN, SECONDARY, ABDOMINAL WASHOUT;  Surgeon: Ernesto Schmitt MD;  Location: [facility] OR     TRANSPLANT LIVER RECIPIENT  DONOR N/A 2021    Procedure: Opening of abdomen, Abdominal Exploration and aborted liver transplant.;  Surgeon: Ernesto Schmitt MD;  Location: [facility] OR     TRANSPLANT LIVER RECIPIENT  DONOR N/A 2021    Procedure: TRANSPLANT, LIVER, RECIPIENT,  DONOR;  Surgeon: Ernesto Schmitt MD;  Location: [facility] OR              Social History:     Social History     Tobacco Use     Smoking status: Never Smoker     Smokeless tobacco: Never Used   Substance Use Topics     Alcohol use: No     Comment: Last drink was 2017            Family History:     Family History   Problem Relation Age of Onset     Cancer Mother         angiosarcoma     Coronary Artery Disease Early Onset Father         MI age 46     Heart Transplant Father      Liver Disease No family hx of      Ulcerative Colitis No family hx of      Crohn's Disease No family hx of             Immunizations:     Immunization History   Administered Date(s) Administered     COVID-19,PF,Moderna 2021, 04/15/2021, 2022     Flu, Unspecified 10/18/2016, 2017     HepA-Adult 2021     HepB-Adult 2021     Influenza (IIV3) PF 11/10/2010     Influenza Vaccine IM > 6 months Valent IIV4 (Alfuria,Fluzone) 2015, 2019     Influenza Vaccine, 6+MO IM (QUADRIVALENT W/PRESERVATIVES) 10/17/2018     Pneumo Conj 13-V (2010&after) 2021     Td (Adult), Adsorbed 1995,  05/27/2005     Tdap (Adacel,Boostrix) 07/01/2014     Twinrix A/B 06/12/2021     Zoster vaccine recombinant adjuvanted (SHINGRIX) 06/21/2021             Allergies:     Allergies   Allergen Reactions     Diagnostic X-Ray Materials Hives     PATIENT HAD HIVE REACTION AFTER ADMINISTERING CT CONTRAST DYE.       Contrast Dye              Medications:     Current Outpatient Medications   Medication Sig     amLODIPine (NORVASC) 5 MG tablet TAKE 2 TABLETS(10 MG) BY MOUTH DAILY     aspirin (ASA) 81 MG EC tablet Take 1 tablet (81 mg) by mouth daily     multivitamin (CENTRUM SILVER) tablet Take 1 tablet by mouth daily     mycophenolate (GENERIC EQUIVALENT) 250 MG capsule Take 3 capsules (750 mg) by mouth every 12 hours     predniSONE (DELTASONE) 5 MG tablet Take 1 tablet (5 mg) by mouth daily     tacrolimus (GENERIC EQUIVALENT) 1 MG capsule Take 3 capsules (3 mg) by mouth every 12 hours     ursodiol (ACTIGALL) 300 MG capsule Take 1 capsule (300 mg) by mouth 2 times daily     No current facility-administered medications for this visit.            Physical Exam:     Vitals:    06/14/22 1515   BP: (!) 152/88   Pulse: 94   SpO2: 99%   Weight: 71.7 kg (158 lb)      Constitutional: awake, alert, cooperative, no apparent distress and appears at stated age, well nourished.   Head, ENT, Eyes, and Neck: Normocephalic, sinuses non-tender to palpation, external ears without lesions, moist buccal mucosa without oral thrush, tonsils without swelling, erythema, or exudate, no tenderness palpating teeth, good dentition, gums without necrosis or abscesses.   PERRL, EOMI, pink conjunctivae, non-icteric sclera.   Neck supple without rigidity, no cervical/axillary/inguinal LA bilaterally.   Neurologic: Patient is moving all extremities without focal deficit, no focal sensory loss.   Lungs: CTA bilaterally, no accessory muscle use, no dullness to percussion and no abnormal tactile fremitus.   CVS: RRR, normal S1/S2, no murmur, PMI was not  displaced.   Abdomen: non-tender, non-distended, no masses, no bruit, no shifting dullness, normal BS.   Extremities: no pitting edema of bilateral lower extremities, no ulcers, normal ROM of all joints, no swelling or erythema of any of joints and no tenderness to palpation.   Skin: no induration, fluctuation or discharge, and no rash            Laboratory Data:     No results found for: ACD4    Inflammatory Markers    Recent Labs   Lab Test 11/17/21  1908 08/19/21  0018 06/08/21  1602 06/01/21  0915 05/25/21  1455 05/21/21  1500 05/18/21  1010   CRP 54.0* 40.0* 23.1* 16.1* 14.3* 16.1* 21.4*       Immune Globulin Studies    No lab results found.    Metabolic Studies    Recent Labs   Lab Test 06/06/22  0818 05/23/22  0745 05/16/22  0814 05/09/22  1133 05/02/22  0914 04/18/22  0944 12/06/21  1031 12/03/21  1145 11/23/21  0551 11/23/21  0538 11/19/21  0127 11/19/21  0126    144 145* 139 140 139   < > 142   < >  --    < >  --    POTASSIUM 3.8 4.3 3.4 3.9 3.6 4.0   < > 4.4   < >  --    < >  --    CHLORIDE 114* 114* 117* 111* 110* 110*   < > 113*   < >  --    < >  --    CO2 22 25 21 22 24 22   < > 23   < >  --    < >  --    ANIONGAP 7 5 7 6 6 7   < > 6   < >  --    < >  --    BUN 26 21 21 18 22 24   < > 24   < >  --    < >  --    CR 0.78 0.80 0.77 0.73 0.79 0.80   < > 0.65   < >  --    < >  --    GFRESTIMATED 88 85 89 >90 86 85   < > >90   < >  --    < >  --    GLC 87 87 87 87 84 82   < > 97   < >  --    < >  --    A1C  --   --   --   --   --   --   --   --   --   --   --  5.4   MARIELENA 9.0 8.7 8.8 8.6 8.7 9.1   < > 8.4*   < >  --    < >  --    PHOS 4.4 4.0 3.9 3.3 3.8 4.1   < > 3.5   < > 1.9*   < >  --    MAG 1.8 1.7 1.8 1.7 1.8 1.7   < > 1.7   < > 2.2   < >  --    LACT  --   --   --   --   --   --   --  0.6*  --  0.8   < >  --     < > = values in this interval not displayed.       Hepatic Studies    Recent Labs   Lab Test 06/06/22  0818 05/23/22  0745 05/16/22  0814 05/09/22  1133 05/02/22  0914 04/18/22  0944    BILITOTAL 0.4 0.4 0.5 0.5 0.5 0.5   ALKPHOS 73 84 77 80 86 91   PROTTOTAL 7.6 7.5 7.4 7.3 7.6 7.4   ALBUMIN 3.8 3.7 3.9 3.8 4.0 4.3   AST 31 25 59* 45 41 31   ALT 59* 83* 171* 110* 91* 74*       Hematology Studies     Recent Labs   Lab Test 06/06/22  0818 05/23/22  0745 05/16/22  0814 05/09/22  1133 05/02/22  0914 04/18/22  0944 06/21/21  0938 06/12/21  1259 06/11/21  1134 06/10/21  0702 06/09/21  1527 06/08/21  1602 06/01/21  0915 05/18/21  1010 05/12/21  0542 05/11/21  0724   WBC 3.7* 3.7* 3.5* 4.0 3.6* 3.6*   < > 21.3*   < > 22.8* 27.5*   < > 11.5*   < > 14.5* 18.3*   ANEU  --   --   --   --   --   --   --  18.5*  --  22.0* 25.9*  --  9.3*  --  11.3* 16.3*   ALYM  --   --   --   --   --   --   --  1.1  --  0.8 0.6*  --  0.9  --  1.7 0.9   BRIGIDO  --   --   --   --   --   --   --  1.5*  --  0.0 0.8  --  0.9  --  1.2 0.9   AEOS  --   --   --   --   --   --   --  0.0  --  0.0 0.0  --  0.2  --  0.2 0.0   HGB 11.4* 11.8 11.4* 11.4* 12.0 11.7   < > 10.3*   < > 9.8* 11.6*   < > 11.3*   < > 9.6* 9.7*   HCT 33.5* 34.2* 33.6* 33.6* 35.7 34.5*   < > 32.3*   < > 30.6* 36.6   < > 34.9*   < > 29.2* 30.4*   * 131* 145* 162 146* 159   < > 291   < > 270 431   < > 386   < > 257 264    < > = values in this interval not displayed.       Clotting Studies    Recent Labs   Lab Test 05/17/22  1011 12/13/21  1022 12/03/21  1145 11/22/21  0419 11/21/21  0318 11/20/21  0508   INR 1.09 1.12 1.13 1.22* 1.34*  --    PTT  --   --  36 29 31 33       Urine Studies    Recent Labs   Lab Test 12/03/21  1342 11/24/21 2014 11/20/21  1030 11/06/21  0509 10/25/21  1339 09/01/21  1757   URINEPH 6.0 5.0 5.0 5.5 5.5 5.5   NITRITE Negative Negative Negative Negative Negative Negative   LEUKEST Negative Negative Negative Negative Negative Negative   WBCU 1  --  3 1 1 2         Microbiology:  Last 6 Culture results with specimen source  Culture Micro   Date Value Ref Range Status   06/10/2021 No growth  Final   06/10/2021 Canceled, Test credited   Final   06/10/2021 Duplicate request  Final   06/10/2021   Final    Notification of test cancellation was given to  Christine Torres RN from . 6/10/21 at 0812.TV.     06/10/2021 No growth  Final   06/09/2021 No growth  Final    Specimen Description   Date Value Ref Range Status   06/10/2021 Ascites Fluid  Final   06/10/2021 Ascites Fluid  Final   06/10/2021 Midstream Urine  Final   06/10/2021 Midstream Urine  Final   06/09/2021 Blood Left Arm  Final   06/09/2021 Blood PICC  Final      No results found for: CMV    Last check of C difficile  C Diff Toxin B PCR   Date Value Ref Range Status   05/16/2018 Negative NEG^Negative Final     Comment:     Negative: Clostridium difficile target DNA sequences NOT detected, presumed   negative for Clostridium difficile toxin B or the number of bacteria present   may be below the limit of detection for the test.  FDA approved assay performed using [employer] GeneXpert real-time PCR.  A negative result does not exclude actual disease due to Clostridium difficile   and may be due to improper collection, handling and storage of the specimen   or the number of organisms in the specimen is below the detection limit of the   assay.       C Difficile Toxin B by PCR   Date Value Ref Range Status   12/03/2021 Negative Negative Final     Comment:     A negative result does not exclude actual disease due to C. difficile and may be due to improper collection, handling and storage of the specimen or the number of organisms in the specimen is below the detection limit of the assay.         Virology:  CMV viral loads    CMV viral loads  No results found for: CMVQNT, CMVRESINST, 68259, 81869, 06277, 46599, CMVQAL  CMV viral loads  No lab results found.    CMV viral loads  No results found for: CMVQNT, CMVRESINST, CMVLOG, 83204, 31631, 22071, 07432    CMV resistance testing  No lab results found.  No results found for: CMVCID, CMVFOS, CMVGAN     EBV viral loads   No lab results found.  No results  found for: EBVDN, EBRES, EBVDN, EBVSP, EBVPC, EBVPCR    Human Herpes Virus 6 viral loads    No results found for: H6RES No results found for: H6SPEC    CMV Antibody IgG   Date Value Ref Range Status   11/17/2021 No detectable antibody. No detectable antibody.  Final   08/18/2021 No detectable antibody. No detectable antibody.  Final     CMV Antibody IgM   Date Value Ref Range Status   11/17/2021 Negative Negative Final   08/18/2021 Negative Negative Final     No results found for: EBIG2, EBIGM, EBVIGG, EBIGG, EBVAGN, [id number], TOXG      Pathology:  Colon bx 6/8/2022  Final Diagnosis   A.  CECAL ULCER, BIOPSY :  - Colonic mucosa with ulceration and necroinflammatory debris  - POSITIVE for CMV by immunohistochemistry  - Negative for HSV by immunohistochemistry    B.  CECUM AND ASCENDING COLON, BIOPSIES :  - Colonic mucosa with mild crypt distortion and mild chronic inflammation (Re grade 1)  - Negative for dysplasia      C. HEPATIC FLEXURE AND TRANSVERSE COLON, BIOPSIES :  - Colonic mucosa with mild crypt distortion no significant inflammation (Re grade 0)  - Negative for dysplasia      D. DISTAL AND TRANSVERSE COLON, BIOPSIES :  - Colonic mucosa with no significant inflammation (Re grade 0)  - Negative for dysplasia     E. DESCENDING COLON, BIOPSIES :  - Colonic mucosa with mild crypt distortion and mild patchy chronic inflammation (Re grade 1)  - Negative for dysplasia      F. RECTAL BIOPSIES :  - Colonic mucosa with no significant inflammation (Re grade 0)  - Negative for dysplasia      Liver biopsy 5/17/2022  Final Diagnosis   A.  LIVER, ALLOGRAFT, NEEDLE CORE BIOPSY:  - Mild focal portal inflammation indeterminate for acute cellular rejection (CHIU: 2-3/9)

## 2022-06-15 ENCOUNTER — TELEPHONE (OUTPATIENT)
Dept: TRANSPLANT | Facility: CLINIC | Age: 57
End: 2022-06-15
Payer: COMMERCIAL

## 2022-06-15 DIAGNOSIS — A08.39 CMV COLITIS (H): Primary | ICD-10-CM

## 2022-06-15 DIAGNOSIS — B25.9 CMV COLITIS (H): Primary | ICD-10-CM

## 2022-06-15 LAB
CMV DNA IU/ML, INSTRUMENT: ABNORMAL IU/ML
CMV DNA SPEC NAA+PROBE-LOG#: 4.8 {LOG_COPIES}/ML

## 2022-06-15 NOTE — TELEPHONE ENCOUNTER
Call to Berta to let her know that I spoke to Dr. Dasilva and that we will be watching CMV PCR's weekly.    Dr. Dasilva wants her to have labs next Wednesday, then weekly on wednesdays.  She has been having slight headaches.   I provided encouragement as she does get worried easily.   She remains on tac w/ goal of 6-8, pred 5 and cellcept 750 bid as she had a mild rej in May.   Dr. Dasilva asking if we might think about lowering IS a bit.  Message to Dr. Leventhal.

## 2022-06-16 ENCOUNTER — TELEPHONE (OUTPATIENT)
Dept: TRANSPLANT | Facility: CLINIC | Age: 57
End: 2022-06-16
Payer: COMMERCIAL

## 2022-06-16 DIAGNOSIS — Z94.4 LIVER TRANSPLANTED (H): ICD-10-CM

## 2022-06-16 DIAGNOSIS — T86.41 MILD ACUTE REJECTION OF LIVER TRANSPLANT (H): ICD-10-CM

## 2022-06-16 RX ORDER — MYCOPHENOLATE MOFETIL 250 MG/1
250 CAPSULE ORAL EVERY 12 HOURS
Qty: 60 CAPSULE | Refills: 1 | Status: SHIPPED | OUTPATIENT
Start: 2022-06-16 | End: 2022-07-20

## 2022-06-16 NOTE — TELEPHONE ENCOUNTER
Messages from Dr. Dasilva and leventhal:     Per Leventhal, no need to repeat colonoscopy.    Per Dr. Dasilva, he received a message stating that out of pocket cost for valcyte was high.  Hi Forest,   Do you have any other ideas for Berta to try??     I see that if she used a Good Rx coupon off the Internet, she could get valgancyclovir for $172 -$177 at [facility], but that will not go towards a deductible on her insurance then.     Per Leventhal:    Leventhal, Thomas Michael, MD sent to Vanessa Durant, RN  Caller: Unspecified (Yesterday,  3:31 PM)  Please drop Cellcept to [phone] for the next ~ month.  If LFTs okay, then can stop     Rejection probably more likely CMV related and not typical rejection             Spoke to Berta.  Told her the above.  She is not concerned about the cost of the valganciclovir.  Offered the above info re: Good Rx

## 2022-06-22 ENCOUNTER — LAB (OUTPATIENT)
Dept: LAB | Facility: CLINIC | Age: 57
End: 2022-06-22
Payer: COMMERCIAL

## 2022-06-22 DIAGNOSIS — Z94.4 LIVER REPLACED BY TRANSPLANT (H): ICD-10-CM

## 2022-06-22 DIAGNOSIS — B25.9 CMV COLITIS (H): ICD-10-CM

## 2022-06-22 DIAGNOSIS — A08.39 CMV COLITIS (H): ICD-10-CM

## 2022-06-22 LAB
BASOPHILS # BLD AUTO: 0 10E3/UL (ref 0–0.2)
BASOPHILS NFR BLD AUTO: 1 %
EOSINOPHIL # BLD AUTO: 0.3 10E3/UL (ref 0–0.7)
EOSINOPHIL NFR BLD AUTO: 8 %
ERYTHROCYTE [DISTWIDTH] IN BLOOD BY AUTOMATED COUNT: 14.5 % (ref 10–15)
HCT VFR BLD AUTO: 33.7 % (ref 35–47)
HGB BLD-MCNC: 11.7 G/DL (ref 11.7–15.7)
IMM GRANULOCYTES # BLD: 0.1 10E3/UL
IMM GRANULOCYTES NFR BLD: 3 %
LYMPHOCYTES # BLD AUTO: 0.9 10E3/UL (ref 0.8–5.3)
LYMPHOCYTES NFR BLD AUTO: 27 %
MCH RBC QN AUTO: 29 PG (ref 26.5–33)
MCHC RBC AUTO-ENTMCNC: 34.7 G/DL (ref 31.5–36.5)
MCV RBC AUTO: 84 FL (ref 78–100)
MONOCYTES # BLD AUTO: 0.1 10E3/UL (ref 0–1.3)
MONOCYTES NFR BLD AUTO: 2 %
NEUTROPHILS # BLD AUTO: 2 10E3/UL (ref 1.6–8.3)
NEUTROPHILS NFR BLD AUTO: 60 %
PLATELET # BLD AUTO: 175 10E3/UL (ref 150–450)
RBC # BLD AUTO: 4.03 10E6/UL (ref 3.8–5.2)
WBC # BLD AUTO: 3.3 10E3/UL (ref 4–11)

## 2022-06-22 PROCEDURE — 83735 ASSAY OF MAGNESIUM: CPT

## 2022-06-22 PROCEDURE — 84100 ASSAY OF PHOSPHORUS: CPT

## 2022-06-22 PROCEDURE — 82248 BILIRUBIN DIRECT: CPT

## 2022-06-22 PROCEDURE — 80197 ASSAY OF TACROLIMUS: CPT

## 2022-06-22 PROCEDURE — 80053 COMPREHEN METABOLIC PANEL: CPT

## 2022-06-22 PROCEDURE — 85025 COMPLETE CBC W/AUTO DIFF WBC: CPT

## 2022-06-22 PROCEDURE — 36415 COLL VENOUS BLD VENIPUNCTURE: CPT

## 2022-06-23 ENCOUNTER — TELEPHONE (OUTPATIENT)
Dept: TRANSPLANT | Facility: CLINIC | Age: 57
End: 2022-06-23

## 2022-06-23 DIAGNOSIS — Z94.4 LIVER TRANSPLANTED (H): Primary | ICD-10-CM

## 2022-06-23 DIAGNOSIS — K83.01 PRIMARY SCLEROSING CHOLANGITIS (H): ICD-10-CM

## 2022-06-23 LAB
ALBUMIN SERPL-MCNC: 4 G/DL (ref 3.4–5)
ALP SERPL-CCNC: 75 U/L (ref 40–150)
ALT SERPL W P-5'-P-CCNC: 72 U/L (ref 0–50)
ANION GAP SERPL CALCULATED.3IONS-SCNC: 9 MMOL/L (ref 3–14)
AST SERPL W P-5'-P-CCNC: 33 U/L (ref 0–45)
BILIRUB DIRECT SERPL-MCNC: 0.1 MG/DL (ref 0–0.2)
BILIRUB SERPL-MCNC: 0.5 MG/DL (ref 0.2–1.3)
BUN SERPL-MCNC: 26 MG/DL (ref 7–30)
CALCIUM SERPL-MCNC: 8.4 MG/DL (ref 8.5–10.1)
CHLORIDE BLD-SCNC: 111 MMOL/L (ref 94–109)
CMV DNA IU/ML, INSTRUMENT: ABNORMAL IU/ML
CMV DNA SPEC NAA+PROBE-LOG#: 4.4 {LOG_COPIES}/ML
CO2 SERPL-SCNC: 19 MMOL/L (ref 20–32)
CREAT SERPL-MCNC: 0.77 MG/DL (ref 0.52–1.04)
GFR SERPL CREATININE-BSD FRML MDRD: 89 ML/MIN/1.73M2
GLUCOSE BLD-MCNC: 110 MG/DL (ref 70–99)
MAGNESIUM SERPL-MCNC: 1.2 MG/DL (ref 1.6–2.3)
PHOSPHATE SERPL-MCNC: 4 MG/DL (ref 2.5–4.5)
POTASSIUM BLD-SCNC: 3.7 MMOL/L (ref 3.4–5.3)
PROT SERPL-MCNC: 7.5 G/DL (ref 6.8–8.8)
SODIUM SERPL-SCNC: 139 MMOL/L (ref 133–144)
TACROLIMUS BLD-MCNC: 7.9 UG/L (ref 5–15)
TME LAST DOSE: NORMAL H
TME LAST DOSE: NORMAL H

## 2022-06-23 RX ORDER — MAGNESIUM OXIDE 400 MG/1
400 TABLET ORAL 2 TIMES DAILY
COMMUNITY
Start: 2022-06-23 | End: 2022-08-05

## 2022-06-23 RX ORDER — PREDNISONE 5 MG/1
10 TABLET ORAL DAILY
Qty: 180 TABLET | Refills: 3 | Status: SHIPPED | OUTPATIENT
Start: 2022-06-23 | End: 2022-10-12

## 2022-06-23 ASSESSMENT — ENCOUNTER SYMPTOMS: NEW SYMPTOMS OF CORONARY ARTERY DISEASE: 0

## 2022-06-23 NOTE — TELEPHONE ENCOUNTER
ALT up a bit to 72.  Dr. Leventhal wants to increase pred to 10 mg daily.   Mag is 1.2.  Will restart mag oxide at 400 mg po bid ( she has a supply at home).  We also talked about foods to increase magnesium .

## 2022-06-29 ENCOUNTER — LAB (OUTPATIENT)
Dept: LAB | Facility: CLINIC | Age: 57
End: 2022-06-29
Payer: COMMERCIAL

## 2022-06-29 DIAGNOSIS — A08.39 CMV COLITIS (H): ICD-10-CM

## 2022-06-29 DIAGNOSIS — B25.9 CMV COLITIS (H): ICD-10-CM

## 2022-06-29 LAB
BASOPHILS # BLD AUTO: 0 10E3/UL (ref 0–0.2)
BASOPHILS NFR BLD AUTO: 0 %
EOSINOPHIL # BLD AUTO: 0.1 10E3/UL (ref 0–0.7)
EOSINOPHIL NFR BLD AUTO: 5 %
ERYTHROCYTE [DISTWIDTH] IN BLOOD BY AUTOMATED COUNT: 16.3 % (ref 10–15)
HCT VFR BLD AUTO: 34.2 % (ref 35–47)
HGB BLD-MCNC: 11.5 G/DL (ref 11.7–15.7)
IMM GRANULOCYTES # BLD: 0.1 10E3/UL
IMM GRANULOCYTES NFR BLD: 3 %
LYMPHOCYTES # BLD AUTO: 1 10E3/UL (ref 0.8–5.3)
LYMPHOCYTES NFR BLD AUTO: 34 %
MCH RBC QN AUTO: 29.4 PG (ref 26.5–33)
MCHC RBC AUTO-ENTMCNC: 33.6 G/DL (ref 31.5–36.5)
MCV RBC AUTO: 88 FL (ref 78–100)
MONOCYTES # BLD AUTO: 0 10E3/UL (ref 0–1.3)
MONOCYTES NFR BLD AUTO: 1 %
NEUTROPHILS # BLD AUTO: 1.7 10E3/UL (ref 1.6–8.3)
NEUTROPHILS NFR BLD AUTO: 58 %
PLATELET # BLD AUTO: 199 10E3/UL (ref 150–450)
RBC # BLD AUTO: 3.91 10E6/UL (ref 3.8–5.2)
WBC # BLD AUTO: 2.9 10E3/UL (ref 4–11)

## 2022-06-29 PROCEDURE — 80053 COMPREHEN METABOLIC PANEL: CPT

## 2022-06-29 PROCEDURE — 85025 COMPLETE CBC W/AUTO DIFF WBC: CPT

## 2022-06-29 PROCEDURE — 36415 COLL VENOUS BLD VENIPUNCTURE: CPT

## 2022-06-30 LAB
ALBUMIN SERPL-MCNC: 3.8 G/DL (ref 3.4–5)
ALP SERPL-CCNC: 76 U/L (ref 40–150)
ALT SERPL W P-5'-P-CCNC: 48 U/L (ref 0–50)
ANION GAP SERPL CALCULATED.3IONS-SCNC: 9 MMOL/L (ref 3–14)
AST SERPL W P-5'-P-CCNC: 22 U/L (ref 0–45)
BILIRUB SERPL-MCNC: 0.4 MG/DL (ref 0.2–1.3)
BUN SERPL-MCNC: 32 MG/DL (ref 7–30)
CALCIUM SERPL-MCNC: 9 MG/DL (ref 8.5–10.1)
CHLORIDE BLD-SCNC: 110 MMOL/L (ref 94–109)
CMV DNA IU/ML, INSTRUMENT: 8710 IU/ML
CMV DNA SPEC NAA+PROBE-LOG#: 3.9 {LOG_COPIES}/ML
CO2 SERPL-SCNC: 22 MMOL/L (ref 20–32)
CREAT SERPL-MCNC: 0.88 MG/DL (ref 0.52–1.04)
GFR SERPL CREATININE-BSD FRML MDRD: 76 ML/MIN/1.73M2
GLUCOSE BLD-MCNC: 89 MG/DL (ref 70–99)
POTASSIUM BLD-SCNC: 4.3 MMOL/L (ref 3.4–5.3)
PROT SERPL-MCNC: 7.6 G/DL (ref 6.8–8.8)
SODIUM SERPL-SCNC: 141 MMOL/L (ref 133–144)

## 2022-07-06 ENCOUNTER — LAB (OUTPATIENT)
Dept: LAB | Facility: CLINIC | Age: 57
End: 2022-07-06
Payer: COMMERCIAL

## 2022-07-06 DIAGNOSIS — A08.39 CMV COLITIS (H): ICD-10-CM

## 2022-07-06 DIAGNOSIS — Z94.4 LIVER TRANSPLANTED (H): ICD-10-CM

## 2022-07-06 DIAGNOSIS — B25.9 CMV COLITIS (H): ICD-10-CM

## 2022-07-06 LAB
ALBUMIN SERPL-MCNC: 3.8 G/DL (ref 3.4–5)
ALP SERPL-CCNC: 70 U/L (ref 40–150)
ALT SERPL W P-5'-P-CCNC: 34 U/L (ref 0–50)
ANION GAP SERPL CALCULATED.3IONS-SCNC: 9 MMOL/L (ref 3–14)
AST SERPL W P-5'-P-CCNC: 21 U/L (ref 0–45)
BASOPHILS # BLD MANUAL: 0 10E3/UL (ref 0–0.2)
BASOPHILS NFR BLD MANUAL: 0 %
BILIRUB DIRECT SERPL-MCNC: 0.1 MG/DL (ref 0–0.2)
BILIRUB SERPL-MCNC: 0.5 MG/DL (ref 0.2–1.3)
BUN SERPL-MCNC: 26 MG/DL (ref 7–30)
CALCIUM SERPL-MCNC: 8.5 MG/DL (ref 8.5–10.1)
CHLORIDE BLD-SCNC: 104 MMOL/L (ref 94–109)
CO2 SERPL-SCNC: 23 MMOL/L (ref 20–32)
CREAT SERPL-MCNC: 0.79 MG/DL (ref 0.52–1.04)
EOSINOPHIL # BLD MANUAL: 0 10E3/UL (ref 0–0.7)
EOSINOPHIL NFR BLD MANUAL: 1 %
ERYTHROCYTE [DISTWIDTH] IN BLOOD BY AUTOMATED COUNT: 17.9 % (ref 10–15)
GFR SERPL CREATININE-BSD FRML MDRD: 86 ML/MIN/1.73M2
GLUCOSE BLD-MCNC: 89 MG/DL (ref 70–99)
HCT VFR BLD AUTO: 37.9 % (ref 35–47)
HGB BLD-MCNC: 12.1 G/DL (ref 11.7–15.7)
LYMPHOCYTES # BLD MANUAL: 0.9 10E3/UL (ref 0.8–5.3)
LYMPHOCYTES NFR BLD MANUAL: 42 %
MAGNESIUM SERPL-MCNC: 2.2 MG/DL (ref 1.6–2.3)
MCH RBC QN AUTO: 29.4 PG (ref 26.5–33)
MCHC RBC AUTO-ENTMCNC: 31.9 G/DL (ref 31.5–36.5)
MCV RBC AUTO: 92 FL (ref 78–100)
MONOCYTES # BLD MANUAL: 0.1 10E3/UL (ref 0–1.3)
MONOCYTES NFR BLD MANUAL: 3 %
NEUTROPHILS # BLD MANUAL: 1.2 10E3/UL (ref 1.6–8.3)
NEUTROPHILS NFR BLD MANUAL: 54 %
PLAT MORPH BLD: ABNORMAL
PLATELET # BLD AUTO: 204 10E3/UL (ref 150–450)
POTASSIUM BLD-SCNC: 4 MMOL/L (ref 3.4–5.3)
PROT SERPL-MCNC: 7.4 G/DL (ref 6.8–8.8)
RBC # BLD AUTO: 4.11 10E6/UL (ref 3.8–5.2)
RBC MORPH BLD: ABNORMAL
SODIUM SERPL-SCNC: 136 MMOL/L (ref 133–144)
TACROLIMUS BLD-MCNC: 6.8 UG/L (ref 5–15)
TME LAST DOSE: NORMAL H
TME LAST DOSE: NORMAL H
WBC # BLD AUTO: 2.2 10E3/UL (ref 4–11)

## 2022-07-06 PROCEDURE — 80053 COMPREHEN METABOLIC PANEL: CPT

## 2022-07-06 PROCEDURE — 82248 BILIRUBIN DIRECT: CPT

## 2022-07-06 PROCEDURE — 80197 ASSAY OF TACROLIMUS: CPT

## 2022-07-06 PROCEDURE — 36415 COLL VENOUS BLD VENIPUNCTURE: CPT

## 2022-07-06 PROCEDURE — 83735 ASSAY OF MAGNESIUM: CPT

## 2022-07-06 PROCEDURE — 85007 BL SMEAR W/DIFF WBC COUNT: CPT

## 2022-07-06 PROCEDURE — 85027 COMPLETE CBC AUTOMATED: CPT

## 2022-07-07 LAB
CMV DNA IU/ML, INSTRUMENT: 3537 IU/ML
CMV DNA SPEC NAA+PROBE-LOG#: 3.5 {LOG_COPIES}/ML

## 2022-07-08 ENCOUNTER — DOCUMENTATION ONLY (OUTPATIENT)
Dept: INFECTIOUS DISEASES | Facility: CLINIC | Age: 57
End: 2022-07-08

## 2022-07-08 NOTE — PROGRESS NOTES
The VGCV 900 mg bid was initiated 6/15/2022. The CMV VL declined by 1.3 log over 3 weeks.   To prevent resistance, I am increasing the dose of valcyte to 1350 mg bid.   Forest Dasilva MD

## 2022-07-12 ENCOUNTER — OFFICE VISIT (OUTPATIENT)
Dept: DERMATOLOGY | Facility: CLINIC | Age: 57
End: 2022-07-12
Payer: COMMERCIAL

## 2022-07-12 DIAGNOSIS — Z94.4 LIVER REPLACED BY TRANSPLANT (H): ICD-10-CM

## 2022-07-12 DIAGNOSIS — L72.11 PILAR CYST: Primary | ICD-10-CM

## 2022-07-12 DIAGNOSIS — L73.8 SENILE SEBACEOUS GLAND HYPERPLASIA: ICD-10-CM

## 2022-07-12 DIAGNOSIS — D22.9 MULTIPLE BENIGN NEVI: ICD-10-CM

## 2022-07-12 DIAGNOSIS — L81.4 SOLAR LENTIGO: ICD-10-CM

## 2022-07-12 PROCEDURE — 99203 OFFICE O/P NEW LOW 30 MIN: CPT | Mod: GC | Performed by: DERMATOLOGY

## 2022-07-12 ASSESSMENT — PAIN SCALES - GENERAL: PAINLEVEL: NO PAIN (0)

## 2022-07-12 NOTE — NURSING NOTE
Dermatology Rooming Note    Berta Nava's goals for this visit include:   Chief Complaint   Patient presents with     Skin Check     Berta is here for a skin check.  She has cyst on her scalp that can get painful and big.       Dana Valladares, CMA

## 2022-07-12 NOTE — LETTER
7/12/2022       RE: Berta Nava  [address] [facility] [address]     Dear Colleague,    Thank you for referring your patient, Berta Nava, to the [facility] [place] [facility]. Please see a copy of my visit note below.    [facility] Dermatology  High Risk Non-Melanoma Skin Cancer Clinic Initial Consult Note  Encounter Date: Jul 12, 2022  Office Visit     Dermatology Problem List:  1. Liver transplant 11/18/2021  - Tacrolimus, prednisone, mycophenolate  2. Pilar cysts  - Pending excision  3. 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   - We reviewed with the patient that due to the immunosuppressive medications used following transplant, solid organ transplant recipients are at a roughly 65-fold increased risk of squamous cell carcinoma, 20-fold increased risk of basal cell carcinoma, and 3.4 fold increased risk of melanoma compared to the general population.   - Based on the patient's risk factors and the Skin and Ultraviolet Neoplasia Transplant Risk Assessment Tool, the patient's risk of skin cancer following transplant is categorized as:  - Very High Risk: 5-Year Risk of 44.75% and 10-Year Risk of 74.85%  - We briefly reviewed prevention methods for reducing skin cancer after transplantation including avoidance of sun exposure, avoidance of indoor tanning beds or other indoor tanning devices, use of protective clothing (e.g. wide-brimmed hats, long sleeves, long pants), SPF 30 or greater sunscreen, and UV-protective sunglasses when outdoors.     # Pilar cysts - scalp, x4  Benign etiology reviewed. Symptomatic and bothersome, refer for excision.   - Referral to derm surgery/plastics for excision    # Benign skin findings include: lentigines, benign melanocytic nevi  and sebaceous hyperplasia  - Lesions benign nature, no treatment is indicated at this time, will monitor for any clinical changes  - ABCDs of melanoma were discussed and self skin checks were advised.  - Sun precaution was advised including the use of sun screens of SPF 30 or higher, sun protective clothing, and avoidance of tanning beds.    Procedures Performed:   None    Follow-up: 1 year(s) in-person, or earlier for new or changing lesions    Staff and Scribe:     Scribe Disclosure:  I, Reginald Lima, am serving as a scribe to document services personally performed by Luan Pierson MD based on data collection and the provider's statements to me.     Pati Montiel MD  Dermatology Resident PGY3    Provider Disclosure:   The documentation recorded by the scribe accurately reflects the services I personally performed and the decisions made by me.    Staff Physician Comments:   I saw and evaluated the patient with the resident and I agree with the assessment and plan.  I was present for the examination.    Luan Pierson MD  Pronouns: he/him/his    Department of Dermatology  [facility]: Phone: [phone], Fax:[phone]  [facility]: Phone: [phone] Fax: [phone]  ____________________________________________    CC: Skin Check (Berta is here for a skin check.  She has cyst on her scalp that can get painful and big.  )      HPI:  Ms. Berta Nava is a(n) 56 year old female who presents today as a new patient for skin check and spots of concern on scalp.    Patient is otherwise feeling well, without additional skin concerns.    Type of Organ Transplant: Liver  Reason for Transplant:   Date of Transplant:   Immunosuppression Regimen:    Personal History of Skin Cancer:  Every been diagnosed with the following:   IF YES, (if known) indicate type, body location, year  diagnosed, and treatment.    - Pre-skin cancers (e.g. actinic keratoses): NO  - Atypical nevi: NO  - Keratinocyte carcinomas (basal cell carcinoma or squamous cell carcinoma): YES - in her 20s but unsure location  - Melanoma: NO  - Other type(s) of skin cancer: NO     Family History of Skin Cancer:  Any first-degree relative relative(s) diagnosed with the following:  IF YES, (if known) indicate relationship, type, and age at diagnosis.     - Keratinocyte carcinomas (basal cell carcinoma or squamous cell carcinoma): YES - BCC sister, in her 20s  - Melanoma: YES - sister, around age 50  - Other type of skin cancer: NO    Skin Cancer Review of Systems:  - Fair skin (Figueredo Type I or II): NO  - Blue, green or bebeto eyes: NO  - Light or red hair: NO  - Male sex assigned at birth: NO  - History of blistering sunburns: NO  - Use of indoor tanning devices or sunlamps: Yes - Former   - If YES, indoor tanning device or sunlamp?   - If YES, approximate number of lifetime sessions:   - If YES, were these used before the age of 35?:  - Smoking History: No  - If YES, what is the pack-year smoking history?  - Ever lived in a sub-tropical region? No  - Any history of arsenic exposure (e.g. well water)? No  - Any history of viral warts? Yes - Former  - Have you received the HPV vaccine? NO  - Any occupational exposure to ultraviolet light exposure (e.g. construction work, agricultural work, ): No   - If YES, what was the profession?     Medication Review:  Is the patient currently taking any of the following medications?   IF YES, indicate which medication (if necessary).     - TNF-alpha inhibitor (e.g. etanercept, adalimumab, infliximab): NO  - Tetracycline antibiotic (e.g. minocycline, doxycycline, tetracycline): NO  - Thiazide-containing anti-hypertensive (e.g. hydrochlorothiazide): NO  - Angiotensin receptor blocker (e.g. losartan): NO  - Sulfonylurea (e.g. glipizide): NO  - Amiodarone: NO  - Diltiazem: NO  -  Voriconazole: NO     Labs Reviewed:  N/A    Physical Exam:  Vitals: LMP  (LMP Unknown)   SKIN: Total skin excluding the undergarment areas was performed. The exam included the head/face, neck, both arms, chest, back, abdomen, both legs, digits and/or nails.   - Diffusely tanned skin  - 4 subcutaneous nodules c/w pilar cysts on the scalp:   - R parietal scalp: 2x2 cm    - L parietal scalp: 1x1 cm    - L occipital scalp: 1.5 cm    - Vertex scalp: 2.2x2cm   - There is a skin colored fleshy papule(s) located on the face and L neck.  - Multiple regular brown pigmented macules and papules are identified on the trunk and extremities with accentuation on sun exposed areas  - There are yellow oily papules with central umbilication located on the nose.   - No other lesions of concern on areas examined.     Medications:  Current Outpatient Medications   Medication     amLODIPine (NORVASC) 5 MG tablet     aspirin (ASA) 81 MG EC tablet     magnesium oxide (MAG-OX) 400 MG tablet     multivitamin (CENTRUM SILVER) tablet     mycophenolate (GENERIC EQUIVALENT) 250 MG capsule     predniSONE (DELTASONE) 5 MG tablet     tacrolimus (GENERIC EQUIVALENT) 1 MG capsule     ursodiol (ACTIGALL) 300 MG capsule     valGANciclovir (VALCYTE) 450 MG tablet     No current facility-administered medications for this visit.      Past Medical History:   Patient Active Problem List   Diagnosis     Cholangitis     Hypokalemia     Essential hypertension     Hyperlipidemia     Mixed hearing loss, bilateral     Neoplasm of uncertain behavior of skin     PSC (primary sclerosing cholangitis)     Sicca syndrome (H)     Ulcerative pancolitis with complication (H)     Secondary esophageal varices without bleeding (H)     Primary sclerosing cholangitis     Liver transplant planned     Acute kidney failure, unspecified (H)     Immunosuppressed status (H)     S/P liver transplant (H)     Anemia due to chronic kidney disease     Liver transplant rejection (H)      Past Medical History:   Diagnosis Date     Ascites      Biliary cirrhosis (H)      Cholangitis, sclerosing      Cirrhosis of liver with ascites (H) 3/3/2021     Hearing loss of left ear     wears a hearing aide     History of low potassium      Hyperlipidemia      Hypertension      Liver transplant rejection (H) 5/17/2022    Mild Rejection     SBP (spontaneous bacterial peritonitis) (H) 4/30/2021     Sjogren's syndrome (H)      Ulcerative colitis (H)      Ulcerative pancolitis (H)

## 2022-07-12 NOTE — PROGRESS NOTES
[facility] Dermatology  High Risk Non-Melanoma Skin Cancer Clinic Initial Consult Note  Encounter Date: Jul 12, 2022  Office Visit     Dermatology Problem List:  1. Liver transplant 11/18/2021  - Tacrolimus, prednisone, mycophenolate  2. Pilar cysts  - Pending excision  3. 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   - We reviewed with the patient that due to the immunosuppressive medications used following transplant, solid organ transplant recipients are at a roughly 65-fold increased risk of squamous cell carcinoma, 20-fold increased risk of basal cell carcinoma, and 3.4 fold increased risk of melanoma compared to the general population.   - Based on the patient's risk factors and the Skin and Ultraviolet Neoplasia Transplant Risk Assessment Tool, the patient's risk of skin cancer following transplant is categorized as:  - Very High Risk: 5-Year Risk of 44.75% and 10-Year Risk of 74.85%  - We briefly reviewed prevention methods for reducing skin cancer after transplantation including avoidance of sun exposure, avoidance of indoor tanning beds or other indoor tanning devices, use of protective clothing (e.g. wide-brimmed hats, long sleeves, long pants), SPF 30 or greater sunscreen, and UV-protective sunglasses when outdoors.     # Pilar cysts - scalp, x4  Benign etiology reviewed. Symptomatic and bothersome, refer for excision.   - Referral to derm surgery/plastics for excision    # Benign skin findings include: lentigines, benign melanocytic nevi and sebaceous hyperplasia  - Lesions benign nature, no treatment is indicated at this time, will monitor for any clinical changes  - ABCDs of melanoma were discussed and self skin checks were advised.  - Sun precaution was advised including the use of sun screens of SPF 30 or higher, sun protective clothing, and  avoidance of tanning beds.    Procedures Performed:   None    Follow-up: 1 year(s) in-person, or earlier for new or changing lesions    Staff and Scribe:     Scribe Disclosure:  I, Reginald Clarence, am serving as a scribe to document services personally performed by Luan Pierson MD based on data collection and the provider's statements to me.     Pati Montiel MD  Dermatology Resident PGY3    Provider Disclosure:   The documentation recorded by the scribe accurately reflects the services I personally performed and the decisions made by me.    Staff Physician Comments:   I saw and evaluated the patient with the resident and I agree with the assessment and plan.  I was present for the examination.    Luan Pierson MD  Pronouns: he/him/his    Department of Dermatology  [facility]: Phone: [phone], Fax:[phone]  [facility]: Phone: [phone] Fax: [phone]  ____________________________________________    CC: Skin Check (Berta is here for a skin check.  She has cyst on her scalp that can get painful and big.  )      HPI:  Ms. Betra Nava is a(n) 56 year old female who presents today as a new patient for skin check and spots of concern on scalp.    Patient is otherwise feeling well, without additional skin concerns.    Type of Organ Transplant: Liver  Reason for Transplant:   Date of Transplant:   Immunosuppression Regimen:    Personal History of Skin Cancer:  Every been diagnosed with the following:   IF YES, (if known) indicate type, body location, year diagnosed, and treatment.    - Pre-skin cancers (e.g. actinic keratoses): NO  - Atypical nevi: NO  - Keratinocyte carcinomas (basal cell carcinoma or squamous cell carcinoma): YES - in her 20s but unsure location  - Melanoma: NO  - Other type(s) of skin cancer: NO     Family History of Skin Cancer:  Any first-degree  relative relative(s) diagnosed with the following:  IF YES, (if known) indicate relationship, type, and age at diagnosis.     - Keratinocyte carcinomas (basal cell carcinoma or squamous cell carcinoma): YES - BCC sister, in her 20s  - Melanoma: YES - sister, around age 50  - Other type of skin cancer: NO    Skin Cancer Review of Systems:  - Fair skin (Figueredo Type I or II): NO  - Blue, green or bebeto eyes: NO  - Light or red hair: NO  - Male sex assigned at birth: NO  - History of blistering sunburns: NO  - Use of indoor tanning devices or sunlamps: Yes - Former   - If YES, indoor tanning device or sunlamp?   - If YES, approximate number of lifetime sessions:   - If YES, were these used before the age of 35?:  - Smoking History: No  - If YES, what is the pack-year smoking history?  - Ever lived in a sub-tropical region? No  - Any history of arsenic exposure (e.g. well water)? No  - Any history of viral warts? Yes - Former  - Have you received the HPV vaccine? NO  - Any occupational exposure to ultraviolet light exposure (e.g. construction work, agricultural work, ): No   - If YES, what was the profession?     Medication Review:  Is the patient currently taking any of the following medications?   IF YES, indicate which medication (if necessary).     - TNF-alpha inhibitor (e.g. etanercept, adalimumab, infliximab): NO  - Tetracycline antibiotic (e.g. minocycline, doxycycline, tetracycline): NO  - Thiazide-containing anti-hypertensive (e.g. hydrochlorothiazide): NO  - Angiotensin receptor blocker (e.g. losartan): NO  - Sulfonylurea (e.g. glipizide): NO  - Amiodarone: NO  - Diltiazem: NO  - Voriconazole: NO     Labs Reviewed:  N/A    Physical Exam:  Vitals: LMP  (LMP Unknown)   SKIN: Total skin excluding the undergarment areas was performed. The exam included the head/face, neck, both arms, chest, back, abdomen, both legs, digits and/or nails.   - Diffusely tanned skin  - 4 subcutaneous nodules c/w pilar  cysts on the scalp:   - R parietal scalp: 2x2 cm    - L parietal scalp: 1x1 cm    - L occipital scalp: 1.5 cm    - Vertex scalp: 2.2x2cm   - There is a skin colored fleshy papule(s) located on the face and L neck.  - Multiple regular brown pigmented macules and papules are identified on the trunk and extremities with accentuation on sun exposed areas  - There are yellow oily papules with central umbilication located on the nose.   - No other lesions of concern on areas examined.     Medications:  Current Outpatient Medications   Medication     amLODIPine (NORVASC) 5 MG tablet     aspirin (ASA) 81 MG EC tablet     magnesium oxide (MAG-OX) 400 MG tablet     multivitamin (CENTRUM SILVER) tablet     mycophenolate (GENERIC EQUIVALENT) 250 MG capsule     predniSONE (DELTASONE) 5 MG tablet     tacrolimus (GENERIC EQUIVALENT) 1 MG capsule     ursodiol (ACTIGALL) 300 MG capsule     valGANciclovir (VALCYTE) 450 MG tablet     No current facility-administered medications for this visit.      Past Medical History:   Patient Active Problem List   Diagnosis     Cholangitis     Hypokalemia     Essential hypertension     Hyperlipidemia     Mixed hearing loss, bilateral     Neoplasm of uncertain behavior of skin     PSC (primary sclerosing cholangitis)     Sicca syndrome (H)     Ulcerative pancolitis with complication (H)     Secondary esophageal varices without bleeding (H)     Primary sclerosing cholangitis     Liver transplant planned     Acute kidney failure, unspecified (H)     Immunosuppressed status (H)     S/P liver transplant (H)     Anemia due to chronic kidney disease     Liver transplant rejection (H)     Past Medical History:   Diagnosis Date     Ascites      Biliary cirrhosis (H)      Cholangitis, sclerosing      Cirrhosis of liver with ascites (H) 3/3/2021     Hearing loss of left ear     wears a hearing aide     History of low potassium      Hyperlipidemia      Hypertension      Liver transplant rejection (H)  5/17/2022    Mild Rejection     SBP (spontaneous bacterial peritonitis) (H) 4/30/2021     Sjogren's syndrome (H)      Ulcerative colitis (H)      Ulcerative pancolitis (H)

## 2022-07-13 ENCOUNTER — LAB (OUTPATIENT)
Dept: LAB | Facility: CLINIC | Age: 57
End: 2022-07-13
Payer: COMMERCIAL

## 2022-07-13 DIAGNOSIS — B25.9 CMV COLITIS (H): ICD-10-CM

## 2022-07-13 DIAGNOSIS — A08.39 CMV COLITIS (H): ICD-10-CM

## 2022-07-13 LAB
BASOPHILS # BLD MANUAL: 0 10E3/UL (ref 0–0.2)
BASOPHILS NFR BLD MANUAL: 1 %
EOSINOPHIL # BLD MANUAL: 0 10E3/UL (ref 0–0.7)
EOSINOPHIL NFR BLD MANUAL: 0 %
ERYTHROCYTE [DISTWIDTH] IN BLOOD BY AUTOMATED COUNT: 17.9 % (ref 10–15)
HCT VFR BLD AUTO: 38.9 % (ref 35–47)
HGB BLD-MCNC: 12.9 G/DL (ref 11.7–15.7)
LYMPHOCYTES # BLD MANUAL: 0.9 10E3/UL (ref 0.8–5.3)
LYMPHOCYTES NFR BLD MANUAL: 38 %
MCH RBC QN AUTO: 30.1 PG (ref 26.5–33)
MCHC RBC AUTO-ENTMCNC: 33.2 G/DL (ref 31.5–36.5)
MCV RBC AUTO: 91 FL (ref 78–100)
MONOCYTES # BLD MANUAL: 0 10E3/UL (ref 0–1.3)
MONOCYTES NFR BLD MANUAL: 2 %
NEUTROPHILS # BLD MANUAL: 1.4 10E3/UL (ref 1.6–8.3)
NEUTROPHILS NFR BLD MANUAL: 59 %
NRBC # BLD AUTO: 0 10E3/UL
NRBC BLD MANUAL-RTO: 1 %
PLAT MORPH BLD: ABNORMAL
PLATELET # BLD AUTO: 218 10E3/UL (ref 150–450)
RBC # BLD AUTO: 4.29 10E6/UL (ref 3.8–5.2)
RBC MORPH BLD: ABNORMAL
WBC # BLD AUTO: 2.3 10E3/UL (ref 4–11)

## 2022-07-13 PROCEDURE — 85027 COMPLETE CBC AUTOMATED: CPT

## 2022-07-13 PROCEDURE — 36415 COLL VENOUS BLD VENIPUNCTURE: CPT

## 2022-07-13 PROCEDURE — 80053 COMPREHEN METABOLIC PANEL: CPT

## 2022-07-13 PROCEDURE — 85007 BL SMEAR W/DIFF WBC COUNT: CPT

## 2022-07-14 ENCOUNTER — TELEPHONE (OUTPATIENT)
Dept: TRANSPLANT | Facility: CLINIC | Age: 57
End: 2022-07-14

## 2022-07-14 ENCOUNTER — OFFICE VISIT (OUTPATIENT)
Dept: GASTROENTEROLOGY | Facility: CLINIC | Age: 57
End: 2022-07-14
Attending: INTERNAL MEDICINE
Payer: COMMERCIAL

## 2022-07-14 ENCOUNTER — PRE VISIT (OUTPATIENT)
Dept: GASTROENTEROLOGY | Facility: CLINIC | Age: 57
End: 2022-07-14

## 2022-07-14 VITALS
WEIGHT: 165.9 LBS | HEIGHT: 66 IN | SYSTOLIC BLOOD PRESSURE: 159 MMHG | OXYGEN SATURATION: 100 % | HEART RATE: 92 BPM | DIASTOLIC BLOOD PRESSURE: 102 MMHG | BODY MASS INDEX: 26.66 KG/M2

## 2022-07-14 DIAGNOSIS — Z94.4 S/P LIVER TRANSPLANT (H): Primary | ICD-10-CM

## 2022-07-14 DIAGNOSIS — K83.01 PSC (PRIMARY SCLEROSING CHOLANGITIS) (H): ICD-10-CM

## 2022-07-14 DIAGNOSIS — A08.39 CMV COLITIS (H): Primary | ICD-10-CM

## 2022-07-14 DIAGNOSIS — B25.9 CMV COLITIS (H): Primary | ICD-10-CM

## 2022-07-14 DIAGNOSIS — K52.9 COLITIS: ICD-10-CM

## 2022-07-14 LAB
ALBUMIN SERPL-MCNC: 4 G/DL (ref 3.4–5)
ALP SERPL-CCNC: 66 U/L (ref 40–150)
ALT SERPL W P-5'-P-CCNC: 46 U/L (ref 0–50)
ANION GAP SERPL CALCULATED.3IONS-SCNC: 4 MMOL/L (ref 3–14)
AST SERPL W P-5'-P-CCNC: 26 U/L (ref 0–45)
BILIRUB SERPL-MCNC: 0.6 MG/DL (ref 0.2–1.3)
BUN SERPL-MCNC: 25 MG/DL (ref 7–30)
CALCIUM SERPL-MCNC: 9.2 MG/DL (ref 8.5–10.1)
CHLORIDE BLD-SCNC: 109 MMOL/L (ref 94–109)
CMV DNA IU/ML, INSTRUMENT: 932 IU/ML
CMV DNA SPEC NAA+PROBE-LOG#: 3 {LOG_COPIES}/ML
CO2 SERPL-SCNC: 26 MMOL/L (ref 20–32)
CREAT SERPL-MCNC: 0.79 MG/DL (ref 0.52–1.04)
GFR SERPL CREATININE-BSD FRML MDRD: 86 ML/MIN/1.73M2
GLUCOSE BLD-MCNC: 89 MG/DL (ref 70–99)
POTASSIUM BLD-SCNC: 4 MMOL/L (ref 3.4–5.3)
PROT SERPL-MCNC: 7.6 G/DL (ref 6.8–8.8)
SODIUM SERPL-SCNC: 139 MMOL/L (ref 133–144)

## 2022-07-14 PROCEDURE — 99215 OFFICE O/P EST HI 40 MIN: CPT | Mod: GC | Performed by: INTERNAL MEDICINE

## 2022-07-14 ASSESSMENT — PAIN SCALES - GENERAL: PAINLEVEL: NO PAIN (0)

## 2022-07-14 NOTE — TELEPHONE ENCOUNTER
Instructed pt to stop MMF and have labs every 2 weeks x 2, then monthly until November. Pt able to repeat instructions.

## 2022-07-14 NOTE — TELEPHONE ENCOUNTER
Per Leventhal, Leventhal, Thomas Michael, MD sent to Vanessa Durant RN  Okay to stop MMF       Please call Berta to tell her this, suggest labs every 2 weeks x 2, then monthly until her 1 year anniversary.

## 2022-07-14 NOTE — NURSING NOTE
"Chief Complaint   Patient presents with     New Patient       Vitals:    07/14/22 0842   BP: (!) 159/102   Pulse: 92   SpO2: 100%   Weight: 75.3 kg (165 lb 14.4 oz)   Height: 1.676 m (5' 6\")       Body mass index is 26.78 kg/m .    Ada Stokes MA    "

## 2022-07-14 NOTE — LETTER
7/14/2022         RE: Berta Nava  [address] [facility] [id number]        Dear Colleague,    Thank you for referring your patient, Berta Nava, to the [facility] GASTRO[facility] [place]. Please see a copy of my visit note below.    GI CLINIC VISIT    CC/REFERRING MD:  Thomas Michael Leventhal  REASON FOR CONSULTATION: History of Ulcerative Colitis    ASSESSMENT/PLAN:  #Ulcerative Colitis  #H/o of PSC s/p liver transplant  #CMV  #Constipation  #Bloating  Ms. Nava is a 56 year old female with history of ulcerative colitis diagnosed > 10 years ago at outside system. At time of diagnosis she had symptoms of loose stools and urgency but since that time has been mainly well controlled without medication. She had episode of blood in stool and was started on Lialda which was later discontinued. Recent screening colonoscopy revealed ulcer with +CMV and mild inflammation (Manzano 1) and mild chronic inflammatory findings on biopsy. Unclear if inflammation 2/2 CMV or underlying ulcerative colitis. Suspect MV in setting of chronic immunosuppression in setting of liver transplant. If inflammation continues to be present after CMV treated, would be in favor of starting treatment for UC despite lack of symptoms given chronic inflammatory changes increase risk for colon cancer, particularly in setting of PSC. Pt reports constipation and bloating which has been bothersome to her. Has hard, lumpy stool with some straining. Takes Miralax 1-2 days per week currently.  -- Repeat colonoscopy in 6-8 weeks. If CMV ulcer healed and inflammation continues to be present, would be in favor of starting mesalamine for UC.  --Start Miralax daily - start with 1 capful daily (can decrease to 1/2 cap if having stools)  --If no improvement in bloating with treatment of constipation, consider testing/treating for SIBO.     RTC 3 months    Thank you for this consultation.  It was a pleasure to participate in  the care of this patient; please contact us with any further questions.       Sylvia Mack PA-C, RD  Division of Gastroenterology, Hepatology & Nutrition  [facility]        HPI  Ms. Nava is a 56 year old female who is presenting today for initial visit at [facility] GI clinic for history of UC diagnosis and recent colonoscopy revealing ulcer with +CMV and mild chronic inflammation in colon from sigmoid to cecum.    She has been followed closely by Dr. 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 She was initially diagnosed with  PBC, however diagnosis changed to PSC several years later after biopsy.      Ms. Nava reports that she was diagnosed with UC 10-20 years ago at [facility]. She reports having loose stools and urgency at time of diagnosis, however no blood in stool, tenesmus, or incontinence. She reports that GI symptoms improved and she was not started on treatment for UC until 2019 when she had several days of blood in stool.  Interestingly colonoscopy at the time (2022) showed erythematous mucosa in the descending colon, in the transverse colon, at the hepatic flexure, in the ascending colon and in the cecum however biopsies showed normal mucosa. She started mesalamine (Lialda), symptoms improved. This was subsequently stopped 2021 presumably from improved symptomatology and lack of endoscopic inflammation (no adverse effects from med per pt) and reports has not had blood or significant loose stool since that time.     Currently she reports overall feeling well. Well BMs are formed, but somewhat hard and feels constipated. She is currently having BMs every 1-2 days ([place] 1-3). Some straining. Taking 1 cap Miralax with coffee~2x/wk, when feeling more symptomatic, she reports this helps. She started Magnesium oxide supplementation for low Mg level, however does not feel like this impacted  stool. Occasional RLQ twinge, but denies overt abdominal pain, denies N/V. No fevers or chills.    Recently having more dry mouth/dry eye. Saw Rheumatology 10 yrs ago and reports was diagnosed with possible Sjogren's.    IBD HISTORY  Age at diagnosis: 10-20 years ago  Extent of disease:  Current UC medications none  Prior UC surgeries: none  Prior IBD Medications: Mesalamine (stopped 2021)    Extra intestinal manifestations of IBD:  No uveitis/episcleritis  No aphthous ulcers   No arthritis   No erythema nodosum/pyoderma gangrenosum.     ROS:    No fevers or chills  No weight loss  No blurry vision, double vision or change in vision  No sore throat  No lymphadenopathy  No headache, paraesthesias, or weakness in a limb  No shortness of breath or wheezing  No chest pain or pressure  No arthralgias or myalgias  No rashes or skin changes  No odynophagia or dysphagia  No BRBPR, hematochezia, melena  No dysuria, frequency or urgency  No hot/cold intolerance or polyria  No anxiety or depression    PROBLEM LIST    Patient Active Problem List    Diagnosis Date Noted     Liver transplant rejection (H) 05/17/2022     Priority: Medium     Mild Rejection       Anemia due to chronic kidney disease 11/30/2021     Priority: Medium     Immunosuppressed status (H) 11/29/2021     Priority: Medium     S/P liver transplant (H) 11/29/2021     Priority: Medium     Acute kidney failure, unspecified (H) 09/02/2021     Priority: Medium     Liver transplant planned 08/18/2021     Priority: Medium     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     Priority: Medium     Added automatically from request for surgery [id number]       Secondary esophageal varices without bleeding (H) 06/21/2021     Priority: Medium     PSC (primary sclerosing cholangitis) 06/12/2019     Priority: Medium     Ulcerative pancolitis with complication (H) 06/12/2019     Priority: Medium     Cholangitis 05/13/2018     Priority: Medium     Hypokalemia 05/13/2018     Priority:  Medium     Sicca syndrome (H) 12/11/2017     Priority: Medium     Hyperlipidemia 02/23/2012     Priority: Medium     Mixed hearing loss, bilateral 01/07/2009     Priority: Medium     Neoplasm of uncertain behavior of skin 01/26/2007     Priority: Medium     Overview:   LW Modifier:  moderate DN- left buttock  ; Dysplastic Nevus  Overview:   LW Modifier:  recurrent- right upper back  ; Dysplastic Nevus       Essential hypertension 06/07/2003     Priority: Medium     Overview:   Hypertension         PERTINENT PAST MEDICAL HISTORY:    Past Medical History:   Diagnosis Date     Ascites      Biliary cirrhosis (H)      Cholangitis, sclerosing      Cirrhosis of liver with ascites (H) 3/3/2021     Hearing loss of left ear     wears a hearing aide     History of low potassium      Hyperlipidemia      Hypertension      Liver transplant rejection (H) 5/17/2022    Mild Rejection     SBP (spontaneous bacterial peritonitis) (H) 4/30/2021     Sjogren's syndrome (H)      Ulcerative colitis (H)      Ulcerative pancolitis (H)        PREVIOUS SURGERIES:    Past Surgical History:   Procedure Laterality Date     BENCH LIVER N/A 11/18/2021    Procedure: Bench liver;  Surgeon: Ernesto Schmitt MD;  Location:  OR     COLONOSCOPY N/A 6/8/2022    Procedure: COLONOSCOPY, WITH BIOPSY;  Surgeon: Leventhal, Thomas Michael, MD;  Location: [facility] OR     COMBINED ESOPHAGOSCOPY, GASTROSCOPY, DUODENOSCOPY (EGD), REMOVE BILIARY STENT N/A 2/9/2022    Procedure: ESOPHAGOGASTRODUODENOSCOPY, WITH BILIARY STENT REMOVAL;  Surgeon: Leventhal, Thomas Michael, MD;  Location: [facility] OR     CV CORONARY ANGIOGRAM N/A 8/25/2021    Procedure: Coronary Angiogram with possible intervention;  Surgeon: David Wilhelm MD;  Location:  HEART CARDIAC CATH LAB     ENDOSCOPIC RETROGRADE CHOLANGIOPANCREATOGRAM N/A 6/25/2021    Procedure: ENDOSCOPIC RETROGRADE CHOLANGIOPANCREATOGRAPHY with ballon sweep of bile ducts for stones, balloon dilation of bile ducts and  bile duct stent placement;  Surgeon: Gregory Gabriel MD;  Location: [facility] OR     ENDOSCOPIC RETROGRADE CHOLANGIOPANCREATOGRAM N/A 7/22/2021    Procedure: ENDOSCOPIC RETROGRADE CHOLANGIOPANCREATOGRAPHY STONE REMOVAL, DILATION AND STENT PLACEMENT;  Surgeon: Gregory Gabriel MD;  Location:  OR     ENDOSCOPIC RETROGRADE CHOLANGIOPANCREATOGRAPHY, EXCHANGE TUBE/STENT N/A 05/05/2021    Procedure: ENDOSCOPIC RETROGRADE CHOLANGIOPANCREATOGRAPHY WITH STENT EXCHANGE, STONE EXTRACTION, AND DILATION;  Surgeon: Gregory Gabriel MD;  Location: [facility] OR     ENDOSCOPIC RETROGRADE CHOLANGIOPANCREATOGRAPHY, EXCHANGE TUBE/STENT N/A 5/10/2021    Procedure: ENDOSCOPIC RETROGRADE CHOLANGIOPANCREATOGRAPHY with biliary dilation, stone removal, stent exchange;  Surgeon: Gregory Gabriel MD;  Location: [facility] OR     ENDOSCOPIC RETROGRADE CHOLANGIOPANCREATOGRAPHY, EXCHANGE TUBE/STENT N/A 6/10/2021    Procedure: ENDOSCOPIC RETROGRADE CHOLANGIOPANCREATOGRAPHY, WITH biliary stent exchange, stone removal;  Surgeon: Woodrow Lott MD;  Location: [facility] OR     ENDOSCOPIC RETROGRADE CHOLANGIOPANCREATOGRAPHY, EXCHANGE TUBE/STENT N/A 8/30/2021    Procedure: ENDOSCOPIC RETROGRADE CHOLANGIOPANCREATOGRAPHY with biliary dilation, stone removal, stent exchange;  Surgeon: Gregory Gabriel MD;  Location: [facility] OR     ENDOSCOPIC RETROGRADE CHOLANGIOPANCREATOGRAPHY, EXCHANGE TUBE/STENT N/A 10/1/2021    Procedure: ENDOSCOPIC RETROGRADE CHOLANGIOPANCREATOGRAPHY with balloon sweep of bile ducts, bile duct stent exchanged;  Surgeon: Gregory Gabriel MD;  Location: [facility] OR     ESOPHAGOSCOPY, GASTROSCOPY, DUODENOSCOPY (EGD), COMBINED N/A 9/4/2021    Procedure: ESOPHAGOGASTRODUODENOSCOPY (EGD);  Surgeon: Leventhal, Thomas Michael, MD;  Location:  GI     ESOPHAGOSCOPY, GASTROSCOPY, DUODENOSCOPY (EGD), COMBINED N/A 10/1/2021    Procedure: ESOPHAGOGASTRODUODENOSCOPY (EGD) with varices banding;  Surgeon: Gregory Gabriel MD;  Location: [facility] OR     GYN SURGERY      bilat fallopian tubes and  ovaries removed     IR LIVER BIOPSY PERCUTANEOUS  2022     PICC DOUBLE LUMEN PLACEMENT Right 2021    42cm (2cm external), Lateral brachial vein     RETURN LIVER TRANSPLANT N/A 2021    Procedure: CLOSURE, WOUND, ABDOMEN, SECONDARY, ABDOMINAL WASHOUT;  Surgeon: Ernesto Schmitt MD;  Location: [facility] OR     TRANSPLANT LIVER RECIPIENT  DONOR N/A 2021    Procedure: Opening of abdomen, Abdominal Exploration and aborted liver transplant.;  Surgeon: Ernesto Schmitt MD;  Location: [facility] OR     TRANSPLANT LIVER RECIPIENT  DONOR N/A 2021    Procedure: TRANSPLANT, LIVER, RECIPIENT,  DONOR;  Surgeon: Ernesto Schmitt MD;  Location: [facility] OR       PREVIOUS ENDOSCOPY:  Colonoscopy 2022  Impression:            - A single (solitary) ulcer in the cecum. Biopsied.    - Ulcerative colitis. Inflammation was found from the sigmoid colon to the cecum. This was graded as Manzano Score 1 (mild disease). Biopsied.   - The distal rectum and anal verge are normal on retroflexion view.   - The examined portion of the ileum was normal.   A.  CECAL ULCER, BIOPSY :  - Colonic mucosa with ulceration and necroinflammatory debris  - POSITIVE for CMV by immunohistochemistry  - Negative for HSV by immunohistochemistry    B.  CECUM AND ASCENDING COLON, BIOPSIES :  - Colonic mucosa with mild crypt distortion and mild chronic inflammation (Re grade 1)  - Negative for dysplasia      C. HEPATIC FLEXURE AND TRANSVERSE COLON, BIOPSIES :  - Colonic mucosa with mild crypt distortion no significant inflammation (Re grade 0)  - Negative for dysplasia      D. DISTAL AND TRANSVERSE COLON, BIOPSIES :  - Colonic mucosa with no significant inflammation (Re grade 0)  - Negative for dysplasia     E. DESCENDING COLON, BIOPSIES :  - Colonic mucosa with mild crypt distortion and mild patchy chronic inflammation (Re grade 1)  - Negative for dysplasia      F. RECTAL BIOPSIES :  - Colonic mucosa with no  significant inflammation (Re grade 0)  - Negative for dysplasia     Colonoscopy 12/23/2020 ([employer])  Impression:   - The examined portion of the ileum was normal.   - Erythematous mucosa in the descending colon, in the transverse colon, at the hepatic flexure, in the ascending colon and in the cecum.  Biopsied.     - Normal mucosa in the rectum. Biopsied.    - Internal hemorrhoids.   A.  Colon, cecum, asc, biopsy:    Benign colonic mucosa, without active colitis or epithelial dysplasia     B.  Colon, transverse, biopsy:    Benign colonic mucosa, without active colitis or epithelial dysplasia     C.  Colon, descending, sig, biopsy:    Benign colonic mucosa, without active colitis or epithelial dysplasia     D.  Colon, rectum, biopsy:    Benign colonic mucosa, without active colitis or epithelial dysplasia      11/19/2014 ([employer])  Impression:            - The entire examined colon is  normal. Biopsied due to prior history of microscopic colitis.      - The examination was otherwise  normal.   Colon, random endoscopic biopsies:      1. Rare foci of focal active colitis (see comment).      2. No epithelial dysplasia identified.       ALLERGIES:     Allergies   Allergen Reactions     Diagnostic X-Ray Materials Hives     PATIENT HAD HIVE REACTION AFTER ADMINISTERING CT CONTRAST DYE.       Contrast Dye        PERTINENT MEDICATIONS:    Current Outpatient Medications:      amLODIPine (NORVASC) 5 MG tablet, TAKE 2 TABLETS(10 MG) BY MOUTH DAILY, Disp: 180 tablet, Rfl: 3     aspirin (ASA) 81 MG EC tablet, Take 1 tablet (81 mg) by mouth daily, Disp: , Rfl:      magnesium oxide (MAG-OX) 400 MG tablet, Take 1 tablet (400 mg) by mouth 2 times daily, Disp: , Rfl:      multivitamin (CENTRUM SILVER) tablet, Take 1 tablet by mouth daily, Disp: , Rfl:      mycophenolate (GENERIC EQUIVALENT) 250 MG capsule, Take 1 capsule (250 mg) by mouth every 12 hours, Disp: 60 capsule, Rfl: 1     predniSONE (DELTASONE) 5 MG  tablet, Take 2 tablets (10 mg) by mouth daily, Disp: 180 tablet, Rfl: 3     tacrolimus (GENERIC EQUIVALENT) 1 MG capsule, Take 3 capsules (3 mg) by mouth every 12 hours, Disp: 340 capsule, Rfl: 3     ursodiol (ACTIGALL) 300 MG capsule, Take 1 capsule (300 mg) by mouth 2 times daily, Disp: 180 capsule, Rfl: 3     valGANciclovir (VALCYTE) 450 MG tablet, Take 2 tablets (900 mg) by mouth 2 times daily for 90 days, Disp: 360 tablet, Rfl: 0   No other NSAID/anticoagulation reported by patient.   No other OTC/herbal/supplements reported by patient.    SOCIAL HISTORY:  Tobacco: Denies  Alcohol: Denies  Drugs Use: Denies  Work: working from home, IT . Would like to be able to get out more.      Social History     Socioeconomic History     Marital status:      Spouse name: Not on file     Number of children: Not on file     Years of education: Not on file     Highest education level: Not on file   Occupational History     Not on file   Tobacco Use     Smoking status: Never Smoker     Smokeless tobacco: Never Used   Vaping Use     Vaping Use: Never used   Substance and Sexual Activity     Alcohol use: No     Comment: Last drink was 2017     Drug use: No     Sexual activity: Not Currently     Partners: Male   Other Topics Concern     Parent/sibling w/ CABG, MI or angioplasty before 65F 55M? Not Asked   Social History Narrative     Not on file     Social Determinants of Health     Financial Resource Strain: Not on file   Food Insecurity: Not on file   Transportation Needs: Not on file   Physical Activity: Not on file   Stress: Not on file   Social Connections: Not on file   Intimate Partner Violence: Not on file   Housing Stability: Not on file       FAMILY HISTORY:  FH of CRC: No  FH of IBD: No - Father had diarrhea she thinks might have been UC, not diagnosed  No Colon/Panc/Esophageal/other GI CA. No IBD or Celiac Disease. No other Autoimmune, Liver, or Thyroid disease.    Family History   Problem  "Relation Age of Onset     Cancer Mother         angiosarcoma     Coronary Artery Disease Early Onset Father         MI age 46     Heart Transplant Father      Melanoma Sister      Skin Cancer Sister      Liver Disease No family hx of      Ulcerative Colitis No family hx of      Crohn's Disease No family hx of        Past/family/social history reviewed and no changes    PHYSICAL EXAMINATION:  Constitutional: AAOx3, cooperative, pleasant, not dyspneic/diaphoretic, no acute distress  Vitals reviewed: BP (!) 159/102   Pulse 92   Ht 1.676 m (5' 6\")   Wt 75.3 kg (165 lb 14.4 oz)   LMP  (LMP Unknown)   SpO2 100%   BMI 26.78 kg/m    Wt:   Wt Readings from Last 2 Encounters:   07/14/22 75.3 kg (165 lb 14.4 oz)   06/14/22 71.7 kg (158 lb)      Eyes: Sclera anicteric/injected  Ears/nose/mouth/throat: Normal oropharynx without ulcers or exudate, mucus membranes moist, hearing intact  Neck: supple, thyroid normal size  CV: No edema  Respiratory: Unlabored breathing  Lymph: No axillary, submandibular, supraclavicular or inguinal lymphadenopathy  Abd:  Nondistended, +bs, no hepatosplenomegaly, nontender, no peritoneal signs  Skin: warm, perfused, no jaundice  Psych: Normal affect  MSK: Normal gait      PERTINENT STUDIES:    Lab on 07/13/2022   Component Date Value Ref Range Status     WBC Count 07/13/2022 2.3 (A) 4.0 - 11.0 10e3/uL Final     RBC Count 07/13/2022 4.29  3.80 - 5.20 10e6/uL Final     Hemoglobin 07/13/2022 12.9  11.7 - 15.7 g/dL Final     Hematocrit 07/13/2022 38.9  35.0 - 47.0 % Final     MCV 07/13/2022 91  78 - 100 fL Final     MCH 07/13/2022 30.1  26.5 - 33.0 pg Final     MCHC 07/13/2022 33.2  31.5 - 36.5 g/dL Final     RDW 07/13/2022 17.9 (A) 10.0 - 15.0 % Final     Platelet Count 07/13/2022 218  150 - 450 10e3/uL Final     % Neutrophils 07/13/2022 59  % Final     % Lymphocytes 07/13/2022 38  % Final     % Monocytes 07/13/2022 2  % Final     % Eosinophils 07/13/2022 0  % Final     % Basophils 07/13/2022 1  % " Final     NRBCs per 100 WBC 07/13/2022 1 (A) <=0 % Final     Absolute Neutrophils 07/13/2022 1.4 (A) 1.6 - 8.3 10e3/uL Final     Absolute Lymphocytes 07/13/2022 0.9  0.8 - 5.3 10e3/uL Final     Absolute Monocytes 07/13/2022 0.0  0.0 - 1.3 10e3/uL Final     Absolute Eosinophils 07/13/2022 0.0  0.0 - 0.7 10e3/uL Final     Absolute Basophils 07/13/2022 0.0  0.0 - 0.2 10e3/uL Final     Absolute NRBCs 07/13/2022 0.0  <=0.0 10e3/uL Final     RBC Morphology 07/13/2022 Confirmed RBC Indices   Final     Platelet Assessment 07/13/2022 Automated Count Confirmed. Platelet morphology is normal.  Automated Count Confirmed. Platelet morphology is normal. Final         Answers for HPI/ROS submitted by the patient on 7/7/2022  General Symptoms: No  Skin Symptoms: No  HENT Symptoms: No  EYE SYMPTOMS: No  HEART SYMPTOMS: No  LUNG SYMPTOMS: No  INTESTINAL SYMPTOMS: No  URINARY SYMPTOMS: No  GYNECOLOGIC SYMPTOMS: No  BREAST SYMPTOMS: No  SKELETAL SYMPTOMS: No  BLOOD SYMPTOMS: No  NERVOUS SYSTEM SYMPTOMS: No  MENTAL HEALTH SYMPTOMS: No        Attestation signed by Kendrick Crawford MD at 7/21/2022 10:54 AM:  ATTENDING ATTESTATION:     DATE SEEN: 7/14/22    Patient was discussed, seen, and examined by Kendrick manuel. The plan of care and pertinent data/imaging were reviewed with Sylvia Mack PA-C. I agree with the assessment and plan as delineated above with the following additions:     Possible history of UC in setting of PSC. Colonoscopy and biopsies have never been definitive for this. Now recently with incidental finding of colonic inflammation with ulcer that is CMV +. At this time recommend treatment for CMV with repeat colonoscopy in 6-8 weeks. If inflammation persists despite CMV eradication then will consider diagnosis of PSC-associated IBD and treat accordingly. If inflammation resolves then it was likely driven by CMV.    Please contact me with any further questions.    Thank you for this consultation.  It was a  pleasure to participate in the care of this patient; please contact us with any further questions.  I spent a total of 40 minutes during the day of encounter performed chart review, meeting with patient, patient counseling, care coordination, and documentation.      Kendrick Crawford MD  [facility]  Division of Gastroenterology, Hepatology and Nutrition

## 2022-07-14 NOTE — PROGRESS NOTES
GI CLINIC VISIT    CC/REFERRING MD:  Thomas Michael Leventhal  REASON FOR CONSULTATION: History of Ulcerative Colitis    ASSESSMENT/PLAN:  #Ulcerative Colitis  #H/o of PSC s/p liver transplant  #CMV  #Constipation  #Bloating  Ms. Nava is a 56 year old female with history of ulcerative colitis diagnosed > 10 years ago at outside system. At time of diagnosis she had symptoms of loose stools and urgency but since that time has been mainly well controlled without medication. She had episode of blood in stool and was started on Lialda which was later discontinued. Recent screening colonoscopy revealed ulcer with +CMV and mild inflammation (Manzano 1) and mild chronic inflammatory findings on biopsy. Unclear if inflammation 2/2 CMV or underlying ulcerative colitis. Suspect MV in setting of chronic immunosuppression in setting of liver transplant. If inflammation continues to be present after CMV treated, would be in favor of starting treatment for UC despite lack of symptoms given chronic inflammatory changes increase risk for colon cancer, particularly in setting of PSC. Pt reports constipation and bloating which has been bothersome to her. Has hard, lumpy stool with some straining. Takes Miralax 1-2 days per week currently.  -- Repeat colonoscopy in 6-8 weeks. If CMV ulcer healed and inflammation continues to be present, would be in favor of starting mesalamine for UC.  --Start Miralax daily - start with 1 capful daily (can decrease to 1/2 cap if having stools)  --If no improvement in bloating with treatment of constipation, consider testing/treating for SIBO.     RTC 3 months    Thank you for this consultation.  It was a pleasure to participate in the care of this patient; please contact us with any further questions.       Sylvia Mack PA-C, RD  Division of Gastroenterology, Hepatology & Nutrition  [facility]        HPI  Ms. Nava is a 56 year old female who is presenting today for initial visit at  [facility] GI clinic for history of UC diagnosis and recent colonoscopy revealing ulcer with +CMV and mild chronic inflammation in colon from sigmoid to cecum.    She has been followed closely by Dr. 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 She was initially diagnosed with  PBC, however diagnosis changed to PSC several years later after biopsy.      Ms. Nava reports that she was diagnosed with UC 10-20 years ago at [facility]. She reports having loose stools and urgency at time of diagnosis, however no blood in stool, tenesmus, or incontinence. She reports that GI symptoms improved and she was not started on treatment for UC until 2019 when she had several days of blood in stool.  Interestingly colonoscopy at the time (2022) showed erythematous mucosa in the descending colon, in the transverse colon, at the hepatic flexure, in the ascending colon and in the cecum however biopsies showed normal mucosa. She started mesalamine (Lialda), symptoms improved. This was subsequently stopped 2021 presumably from improved symptomatology and lack of endoscopic inflammation (no adverse effects from med per pt) and reports has not had blood or significant loose stool since that time.     Currently she reports overall feeling well. Well BMs are formed, but somewhat hard and feels constipated. She is currently having BMs every 1-2 days ([place] 1-3). Some straining. Taking 1 cap Miralax with coffee~2x/wk, when feeling more symptomatic, she reports this helps. She started Magnesium oxide supplementation for low Mg level, however does not feel like this impacted stool. Occasional RLQ twinge, but denies overt abdominal pain, denies N/V. No fevers or chills.    Recently having more dry mouth/dry eye. Saw Rheumatology 10 yrs ago and reports was diagnosed with possible Sjogren's.    IBD HISTORY  Age at diagnosis: 10-20 years  ago  Extent of disease:  Current UC medications none  Prior UC surgeries: none  Prior IBD Medications: Mesalamine (stopped 2021)    Extra intestinal manifestations of IBD:  No uveitis/episcleritis  No aphthous ulcers   No arthritis   No erythema nodosum/pyoderma gangrenosum.     ROS:    No fevers or chills  No weight loss  No blurry vision, double vision or change in vision  No sore throat  No lymphadenopathy  No headache, paraesthesias, or weakness in a limb  No shortness of breath or wheezing  No chest pain or pressure  No arthralgias or myalgias  No rashes or skin changes  No odynophagia or dysphagia  No BRBPR, hematochezia, melena  No dysuria, frequency or urgency  No hot/cold intolerance or polyria  No anxiety or depression    PROBLEM LIST    Patient Active Problem List    Diagnosis Date Noted     Liver transplant rejection (H) 05/17/2022     Priority: Medium     Mild Rejection       Anemia due to chronic kidney disease 11/30/2021     Priority: Medium     Immunosuppressed status (H) 11/29/2021     Priority: Medium     S/P liver transplant (H) 11/29/2021     Priority: Medium     Acute kidney failure, unspecified (H) 09/02/2021     Priority: Medium     Liver transplant planned 08/18/2021     Priority: Medium     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     Priority: Medium     Added automatically from request for surgery [id number]       Secondary esophageal varices without bleeding (H) 06/21/2021     Priority: Medium     PSC (primary sclerosing cholangitis) 06/12/2019     Priority: Medium     Ulcerative pancolitis with complication (H) 06/12/2019     Priority: Medium     Cholangitis 05/13/2018     Priority: Medium     Hypokalemia 05/13/2018     Priority: Medium     Sicca syndrome (H) 12/11/2017     Priority: Medium     Hyperlipidemia 02/23/2012     Priority: Medium     Mixed hearing loss, bilateral 01/07/2009     Priority: Medium     Neoplasm of uncertain behavior of skin 01/26/2007     Priority: Medium      Overview:   LW Modifier:  moderate DN- left buttock  ; Dysplastic Nevus  Overview:   LW Modifier:  recurrent- right upper back  ; Dysplastic Nevus       Essential hypertension 06/07/2003     Priority: Medium     Overview:   Hypertension         PERTINENT PAST MEDICAL HISTORY:    Past Medical History:   Diagnosis Date     Ascites      Biliary cirrhosis (H)      Cholangitis, sclerosing      Cirrhosis of liver with ascites (H) 3/3/2021     Hearing loss of left ear     wears a hearing aide     History of low potassium      Hyperlipidemia      Hypertension      Liver transplant rejection (H) 5/17/2022    Mild Rejection     SBP (spontaneous bacterial peritonitis) (H) 4/30/2021     Sjogren's syndrome (H)      Ulcerative colitis (H)      Ulcerative pancolitis (H)        PREVIOUS SURGERIES:    Past Surgical History:   Procedure Laterality Date     BENCH LIVER N/A 11/18/2021    Procedure: Bench liver;  Surgeon: Ernesto Schmitt MD;  Location: [facility] OR     COLONOSCOPY N/A 6/8/2022    Procedure: COLONOSCOPY, WITH BIOPSY;  Surgeon: Leventhal, Thomas Michael, MD;  [facility]     COMBINED ESOPHAGOSCOPY, GASTROSCOPY, DUODENOSCOPY (EGD), REMOVE BILIARY STENT N/A 2/9/2022    Procedure: ESOPHAGOGASTRODUODENOSCOPY, WITH BILIARY STENT REMOVAL;  Surgeon: Leventhal, Thomas Michael, MD;  [facility]     CV CORONARY ANGIOGRAM N/A 8/25/2021    Procedure: Coronary Angiogram with possible intervention;  Surgeon: David Wilhelm MD;  Location:  [facility]     ENDOSCOPIC RETROGRADE CHOLANGIOPANCREATOGRAM N/A 6/25/2021    Procedure: ENDOSCOPIC RETROGRADE CHOLANGIOPANCREATOGRAPHY with ballon sweep of bile ducts for stones, balloon dilation of bile ducts and bile duct stent placement;  Surgeon: Gregory Gabriel MD;  Location: [facility] OR     ENDOSCOPIC RETROGRADE CHOLANGIOPANCREATOGRAM N/A 7/22/2021    Procedure: ENDOSCOPIC RETROGRADE CHOLANGIOPANCREATOGRAPHY STONE REMOVAL, DILATION AND STENT PLACEMENT;  Surgeon: Harvey  MD Gregory;  Location:  OR     ENDOSCOPIC RETROGRADE CHOLANGIOPANCREATOGRAPHY, EXCHANGE TUBE/STENT N/A 05/05/2021    Procedure: ENDOSCOPIC RETROGRADE CHOLANGIOPANCREATOGRAPHY WITH STENT EXCHANGE, STONE EXTRACTION, AND DILATION;  Surgeon: Gregory Gabriel MD;  Location: [facility]     ENDOSCOPIC RETROGRADE CHOLANGIOPANCREATOGRAPHY, EXCHANGE TUBE/STENT N/A 5/10/2021    Procedure: ENDOSCOPIC RETROGRADE CHOLANGIOPANCREATOGRAPHY with biliary dilation, stone removal, stent exchange;  Surgeon: Gregory Gabriel MD;  Location: [facility]     ENDOSCOPIC RETROGRADE CHOLANGIOPANCREATOGRAPHY, EXCHANGE TUBE/STENT N/A 6/10/2021    Procedure: ENDOSCOPIC RETROGRADE CHOLANGIOPANCREATOGRAPHY, WITH biliary stent exchange, stone removal;  Surgeon: Woodrow Lott MD;  Location: [facility] OR     ENDOSCOPIC RETROGRADE CHOLANGIOPANCREATOGRAPHY, EXCHANGE TUBE/STENT N/A 8/30/2021    Procedure: ENDOSCOPIC RETROGRADE CHOLANGIOPANCREATOGRAPHY with biliary dilation, stone removal, stent exchange;  Surgeon: Gregory Gabriel MD;  Location: [facility]     ENDOSCOPIC RETROGRADE CHOLANGIOPANCREATOGRAPHY, EXCHANGE TUBE/STENT N/A 10/1/2021    Procedure: ENDOSCOPIC RETROGRADE CHOLANGIOPANCREATOGRAPHY with balloon sweep of bile ducts, bile duct stent exchanged;  Surgeon: Gregory Gabriel MD;  Location:  OR     ESOPHAGOSCOPY, GASTROSCOPY, DUODENOSCOPY (EGD), COMBINED N/A 9/4/2021    Procedure: ESOPHAGOGASTRODUODENOSCOPY (EGD);  Surgeon: Leventhal, Thomas Michael, MD;  Location:  GI     ESOPHAGOSCOPY, GASTROSCOPY, DUODENOSCOPY (EGD), COMBINED N/A 10/1/2021    Procedure: ESOPHAGOGASTRODUODENOSCOPY (EGD) with varices banding;  Surgeon: Gregory Gabriel MD;  Location: [facility] OR     GYN SURGERY      bilat fallopian tubes and ovaries removed     IR LIVER BIOPSY PERCUTANEOUS  5/17/2022     PICC DOUBLE LUMEN PLACEMENT Right 05/08/2021    42cm (2cm external), Lateral brachial vein     RETURN LIVER TRANSPLANT N/A 11/19/2021    Procedure: CLOSURE, WOUND, ABDOMEN, SECONDARY, ABDOMINAL  WASHOUT;  Surgeon: Ernesto Schmitt MD;  Location: [facility] OR     TRANSPLANT LIVER RECIPIENT  DONOR N/A 2021    Procedure: Opening of abdomen, Abdominal Exploration and aborted liver transplant.;  Surgeon: Ernesto Schmitt MD;  Location: [facility] OR     TRANSPLANT LIVER RECIPIENT  DONOR N/A 2021    Procedure: TRANSPLANT, LIVER, RECIPIENT,  DONOR;  Surgeon: Ernesto Schmitt MD;  Location: [facility] OR       PREVIOUS ENDOSCOPY:  Colonoscopy 2022  Impression:            - A single (solitary) ulcer in the cecum. Biopsied.    - Ulcerative colitis. Inflammation was found from the sigmoid colon to the cecum. This was graded as Manzano Score 1 (mild disease). Biopsied.   - The distal rectum and anal verge are normal on retroflexion view.   - The examined portion of the ileum was normal.   A.  CECAL ULCER, BIOPSY :  - Colonic mucosa with ulceration and necroinflammatory debris  - POSITIVE for CMV by immunohistochemistry  - Negative for HSV by immunohistochemistry    B.  CECUM AND ASCENDING COLON, BIOPSIES :  - Colonic mucosa with mild crypt distortion and mild chronic inflammation (Re grade 1)  - Negative for dysplasia      C. HEPATIC FLEXURE AND TRANSVERSE COLON, BIOPSIES :  - Colonic mucosa with mild crypt distortion no significant inflammation (Re grade 0)  - Negative for dysplasia      D. DISTAL AND TRANSVERSE COLON, BIOPSIES :  - Colonic mucosa with no significant inflammation (Re grade 0)  - Negative for dysplasia     E. DESCENDING COLON, BIOPSIES :  - Colonic mucosa with mild crypt distortion and mild patchy chronic inflammation (Re grade 1)  - Negative for dysplasia      F. RECTAL BIOPSIES :  - Colonic mucosa with no significant inflammation (Re grade 0)  - Negative for dysplasia     Colonoscopy 2020 ([email or web address])  Impression:   - The examined portion of the ileum was normal.   - Erythematous mucosa in the descending colon, in the transverse colon, at the  hepatic flexure, in the ascending colon and in the cecum.  Biopsied.     - Normal mucosa in the rectum. Biopsied.    - Internal hemorrhoids.   A.  Colon, cecum, asc, biopsy:    Benign colonic mucosa, without active colitis or epithelial dysplasia     B.  Colon, transverse, biopsy:    Benign colonic mucosa, without active colitis or epithelial dysplasia     C.  Colon, descending, sig, biopsy:    Benign colonic mucosa, without active colitis or epithelial dysplasia     D.  Colon, rectum, biopsy:    Benign colonic mucosa, without active colitis or epithelial dysplasia      11/19/2014 ([facility])  Impression:            - The entire examined colon is  normal. Biopsied due to prior history of microscopic colitis.      - The examination was otherwise  normal.   Colon, random endoscopic biopsies:      1. Rare foci of focal active colitis (see comment).      2. No epithelial dysplasia identified.       ALLERGIES:     Allergies   Allergen Reactions     Diagnostic X-Ray Materials Hives     PATIENT HAD HIVE REACTION AFTER ADMINISTERING CT CONTRAST DYE.       Contrast Dye        PERTINENT MEDICATIONS:    Current Outpatient Medications:      amLODIPine (NORVASC) 5 MG tablet, TAKE 2 TABLETS(10 MG) BY MOUTH DAILY, Disp: 180 tablet, Rfl: 3     aspirin (ASA) 81 MG EC tablet, Take 1 tablet (81 mg) by mouth daily, Disp: , Rfl:      magnesium oxide (MAG-OX) 400 MG tablet, Take 1 tablet (400 mg) by mouth 2 times daily, Disp: , Rfl:      multivitamin (CENTRUM SILVER) tablet, Take 1 tablet by mouth daily, Disp: , Rfl:      mycophenolate (GENERIC EQUIVALENT) 250 MG capsule, Take 1 capsule (250 mg) by mouth every 12 hours, Disp: 60 capsule, Rfl: 1     predniSONE (DELTASONE) 5 MG tablet, Take 2 tablets (10 mg) by mouth daily, Disp: 180 tablet, Rfl: 3     tacrolimus (GENERIC EQUIVALENT) 1 MG capsule, Take 3 capsules (3 mg) by mouth every 12 hours, Disp: 340 capsule, Rfl: 3     ursodiol (ACTIGALL) 300 MG capsule, Take 1 capsule (300 mg)  by mouth 2 times daily, Disp: 180 capsule, Rfl: 3     valGANciclovir (VALCYTE) 450 MG tablet, Take 2 tablets (900 mg) by mouth 2 times daily for 90 days, Disp: 360 tablet, Rfl: 0   No other NSAID/anticoagulation reported by patient.   No other OTC/herbal/supplements reported by patient.    SOCIAL HISTORY:  Tobacco: Denies  Alcohol: Denies  Drugs Use: Denies  Work: working from home, IT . Would like to be able to get out more.      Social History     Socioeconomic History     Marital status:      Spouse name: Not on file     Number of children: Not on file     Years of education: Not on file     Highest education level: Not on file   Occupational History     Not on file   Tobacco Use     Smoking status: Never Smoker     Smokeless tobacco: Never Used   Vaping Use     Vaping Use: Never used   Substance and Sexual Activity     Alcohol use: No     Comment: Last drink was 2017     Drug use: No     Sexual activity: Not Currently     Partners: Male   Other Topics Concern     Parent/sibling w/ CABG, MI or angioplasty before 65F 55M? Not Asked   Social History Narrative     Not on file     Social Determinants of Health     Financial Resource Strain: Not on file   Food Insecurity: Not on file   Transportation Needs: Not on file   Physical Activity: Not on file   Stress: Not on file   Social Connections: Not on file   Intimate Partner Violence: Not on file   Housing Stability: Not on file       FAMILY HISTORY:  FH of CRC: No  FH of IBD: No - Father had diarrhea she thinks might have been UC, not diagnosed  No Colon/Panc/Esophageal/other GI CA. No IBD or Celiac Disease. No other Autoimmune, Liver, or Thyroid disease.    Family History   Problem Relation Age of Onset     Cancer Mother         angiosarcoma     Coronary Artery Disease Early Onset Father         MI age 46     Heart Transplant Father      Melanoma Sister      Skin Cancer Sister      Liver Disease No family hx of      Ulcerative Colitis No  "family hx of      Crohn's Disease No family hx of        Past/family/social history reviewed and no changes    PHYSICAL EXAMINATION:  Constitutional: AAOx3, cooperative, pleasant, not dyspneic/diaphoretic, no acute distress  Vitals reviewed: BP (!) 159/102   Pulse 92   Ht 1.676 m (5' 6\")   Wt 75.3 kg (165 lb 14.4 oz)   LMP  (LMP Unknown)   SpO2 100%   BMI 26.78 kg/m    Wt:   Wt Readings from Last 2 Encounters:   07/14/22 75.3 kg (165 lb 14.4 oz)   06/14/22 71.7 kg (158 lb)      Eyes: Sclera anicteric/injected  Ears/nose/mouth/throat: Normal oropharynx without ulcers or exudate, mucus membranes moist, hearing intact  Neck: supple, thyroid normal size  CV: No edema  Respiratory: Unlabored breathing  Lymph: No axillary, submandibular, supraclavicular or inguinal lymphadenopathy  Abd:  Nondistended, +bs, no hepatosplenomegaly, nontender, no peritoneal signs  Skin: warm, perfused, no jaundice  Psych: Normal affect  MSK: Normal gait      PERTINENT STUDIES:    Lab on 07/13/2022   Component Date Value Ref Range Status     WBC Count 07/13/2022 2.3 (A) 4.0 - 11.0 10e3/uL Final     RBC Count 07/13/2022 4.29  3.80 - 5.20 10e6/uL Final     Hemoglobin 07/13/2022 12.9  11.7 - 15.7 g/dL Final     Hematocrit 07/13/2022 38.9  35.0 - 47.0 % Final     MCV 07/13/2022 91  78 - 100 fL Final     MCH 07/13/2022 30.1  26.5 - 33.0 pg Final     MCHC 07/13/2022 33.2  31.5 - 36.5 g/dL Final     RDW 07/13/2022 17.9 (A) 10.0 - 15.0 % Final     Platelet Count 07/13/2022 218  150 - 450 10e3/uL Final     % Neutrophils 07/13/2022 59  % Final     % Lymphocytes 07/13/2022 38  % Final     % Monocytes 07/13/2022 2  % Final     % Eosinophils 07/13/2022 0  % Final     % Basophils 07/13/2022 1  % Final     NRBCs per 100 WBC 07/13/2022 1 (A) <=0 % Final     Absolute Neutrophils 07/13/2022 1.4 (A) 1.6 - 8.3 10e3/uL Final     Absolute Lymphocytes 07/13/2022 0.9  0.8 - 5.3 10e3/uL Final     Absolute Monocytes 07/13/2022 0.0  0.0 - 1.3 10e3/uL Final     " Absolute Eosinophils 07/13/2022 0.0  0.0 - 0.7 10e3/uL Final     Absolute Basophils 07/13/2022 0.0  0.0 - 0.2 10e3/uL Final     Absolute NRBCs 07/13/2022 0.0  <=0.0 10e3/uL Final     RBC Morphology 07/13/2022 Confirmed RBC Indices   Final     Platelet Assessment 07/13/2022 Automated Count Confirmed. Platelet morphology is normal.  Automated Count Confirmed. Platelet morphology is normal. Final         Answers for HPI/ROS submitted by the patient on 7/7/2022  General Symptoms: No  Skin Symptoms: No  HENT Symptoms: No  EYE SYMPTOMS: No  HEART SYMPTOMS: No  LUNG SYMPTOMS: No  INTESTINAL SYMPTOMS: No  URINARY SYMPTOMS: No  GYNECOLOGIC SYMPTOMS: No  BREAST SYMPTOMS: No  SKELETAL SYMPTOMS: No  BLOOD SYMPTOMS: No  NERVOUS SYSTEM SYMPTOMS: No  MENTAL HEALTH SYMPTOMS: No

## 2022-07-14 NOTE — PATIENT INSTRUCTIONS
It was a pleasure taking care of you today.  I've included a brief summary of our discussion and care plan from today's visit below.  Please review this information with your primary care provider.  ______________________________________________________________________    My recommendations are summarized as follows:  -- Repeat colonoscopy in 6-8 weeks. Depending on results we can decide if we need to start medication for UC.  --Start Miralax daily - start with 1 cap full (can decrease to 1/2 cap if having stools)  -- please see scheduling information provided below   Return to GI Clinic in 2-3 months to review your progress.    ______________________________________________________________________    How do I schedule labs, imaging studies, or procedures that were ordered in clinic today?     Labs: To schedule lab appointment at the [facility], use my chart or call [phone]. If you have a [facility] lab closer to home where you are regularly seen you can give them a call.     Procedures: If a colonoscopy, upper endoscopy, breath test, esophageal manometry, or pH impedence was ordered today, our endoscopy team will call you to schedule this. If you have not heard from our endoscopy team within a week, please call [phone] to schedule.     Imaging Studies: If you were scheduled for a CT scan, X-ray, MRI, ultrasound, HIDA scan or other imaging study, please call [phone] to have this scheduled.     Referral: If a referral to another specialty was ordered, expect a phone call or follow instructions above. If you have not heard from anyone regarding your referral in a week, please call our clinic to check the status.     Who do I call with any questions after my visit?  Please be in touch if there are any further questions that arise following today's visit.  There are multiple ways to contact your gastroenterology care team.      During business hours, you may reach a Gastroenterology nurse  at [phone]    To schedule or reschedule an appointment, please call [phone].     You can always send a secure message through [employer].  [employer] messages are answered by your nurse or doctor typically within 24 hours.  Please allow extra time on weekends and holidays.      For urgent/emergent questions after business hours, you may reach the on-call GI Fellow by contacting the [facility]  at [phone].     How will I get the results of any tests ordered?    You will receive all of your results.  If you have signed up for [employer], any tests ordered at your visit will be available to you after your provider reviews them.  Typically this takes 1-2 weeks.  If there are urgent results that require a change in your care plan, your provider or nurse will call you to discuss the next steps.      What is [employer]?  [employer] is a secure way for you to access all of your healthcare records from the [facility].  It is a web based computer program, so you can sign on to it from any location.  It also allows you to send secure messages to your care team.  I recommend signing up for [employer] access if you have not already done so and are comfortable with using a computer.      How to I schedule a follow-up visit?  If you did not schedule a follow-up visit today, please call [phone] to schedule a follow-up office visit.      Sincerely,    Sylvia Mack PA-C  Division of Gastroenterology, Hepatology & Nutrition  [facility]

## 2022-07-15 ENCOUNTER — TELEPHONE (OUTPATIENT)
Dept: GASTROENTEROLOGY | Facility: CLINIC | Age: 57
End: 2022-07-15

## 2022-07-15 ENCOUNTER — HOSPITAL ENCOUNTER (OUTPATIENT)
Facility: AMBULATORY SURGERY CENTER | Age: 57
End: 2022-07-15
Attending: INTERNAL MEDICINE | Admitting: INTERNAL MEDICINE
Payer: COMMERCIAL

## 2022-07-15 NOTE — TELEPHONE ENCOUNTER
Screening Questions    BlueKIND OF PREP RedLOCATION [review exclusion criteria] GreenSEDATION TYPE      1. Are you active on mychart? Y     2. What insurance is in the chart? [employer]      3.   Ordering/Referring Provider: Sylvia Mack PA-C    4. BMI   (If greater than 40 review exclusion criteria [PAC APPT IF [MAC] @ [facility])  24.2  [If yes, BMI OVER 40-EXTENDED PREP]      **(Sedation review/consideration needed)**  Do you have a legal guardian or Medical Power of    and/or are you able to give consent for your medical care?     SELF     5. Have you had a positive covid test in the last 90 days?   N     6.  Are you currently on dialysis?   N [ If yes, G-PREP & HOSPITAL setting ONLY]     7.  Do you have chronic kidney disease?  N [ If yes, G-PREP ]    8.   Do you have a diagnosis of diabetes?   N   [ If yes, G-PREP ]    9.  On a regular basis do you go 3-5 days between bowel movements?   Y - CONSTIPATED    [ If yes, EXTENDED PREP]    10.  Are you taking any prescription pain medications on a routine schedule?    N  [ If yes, EXTENDED PREP] [If yes, MAC]      11.   Do you have any chemical dependencies such as alcohol, street drugs, or methadone?    N [If yes, MAC]    12.   Do you have any history of post-traumatic stress syndrome, severe anxiety or history of psychosis?    N  [If yes, MAC]    13.  [FEMALES] Are you currently pregnant?     If yes, how many weeks?       Respiratory/Heart Screening:  [If yes to any of the following HOSPITAL setting only]     14. Do you have Pulmonary Hypertension [Lungs]?   N       15. Do you have UNCONTROLLED asthma?   N     16.  Do you use daily home oxygen?  N      17. Do you have mod to severe Obstructive Sleep Apnea?         (OKAY @ [facility]  [facility]  SH  PH  RI  MG - if pt is not on OXYGEN)  N      18.   Have you had a heart or lung transplant?   N      19.   Have you had a stroke or Transient ischemic attack (TIA - aka  mini stroke ) within 6 months?  (If yes, please  review exclusion criteria)  N     20.   In the past 6 months, have you had any heart related issues including cardiomyopathy or heart attack?   N           If yes, did it require cardiac stenting or other implantable device?   N      21.   Do you have any implantable devices in your body (pacemaker, defib, LVAD)? (If yes, please review exclusion criteria)  N     22. Do you take nitroglycerin?   N           If yes, how often? N  (if yes, HOSPITAL setting ONLY)    23.  Are you currently taking any blood thinners?    [If yes, INFORM patient to follw up w/ ORDERING PROVIDER FOR BRIDGING INSTRUCTIONS]     N    24.   Do you transfer independently?                (If NO, please HOSPITAL setting ONLY)  Y    25.   Preferred LOCAL Pharmacy for Pre Prescription:           [id number] - [place], [place] - [address] AT [facility] & [facility] [address]    Scheduling Details  (Please ask for phone number if not scheduled by patient)      Caller : Berta Nava   Date of Procedure: 09/12/2022  Surgeon: CHANTAL   [facility] [address] [place] [address] [ [address] ] [place] [address]        Sedation Type: MAC  l 1ST AVAIL   Conscious Sedation- Needs  for 6 hours after the procedure  MAC/General-Needs  for 24 hours after procedure    N :[Pre-op Required] at [facility]  SH  MG and OR for MAC sedation   (advise patient they will need a pre-op WITH IN 30 DAYS of procedure date)     Type of Procedure Scheduled:   Lower Endoscopy [Colonoscopy]    Which Colonoscopy Prep was Sent?:   EXT PREP  - CONSTIPATED     KHORUTS CF PATIENTS & GROEN'S PATIENTS NEEDS EXTENDED PREP       Informed patient they will need an adult  Y  Cannot take any type of public or medical transportation alone    Pre-Procedure Covid test to be completed at [facility] or Externally: Y  **INFORMED OF HOME TESTING & LAB OPTION**        Confirmed Nurse will call to complete assessment Y    Additional comments: N

## 2022-07-20 ENCOUNTER — LAB (OUTPATIENT)
Dept: LAB | Facility: CLINIC | Age: 57
End: 2022-07-20
Payer: COMMERCIAL

## 2022-07-20 ENCOUNTER — DOCUMENTATION ONLY (OUTPATIENT)
Dept: TRANSPLANT | Facility: CLINIC | Age: 57
End: 2022-07-20

## 2022-07-20 DIAGNOSIS — A08.39 CMV COLITIS (H): ICD-10-CM

## 2022-07-20 DIAGNOSIS — B25.9 CMV COLITIS (H): ICD-10-CM

## 2022-07-20 LAB
ALBUMIN SERPL-MCNC: 3.7 G/DL (ref 3.4–5)
ALP SERPL-CCNC: 57 U/L (ref 40–150)
ALT SERPL W P-5'-P-CCNC: 32 U/L (ref 0–50)
ANION GAP SERPL CALCULATED.3IONS-SCNC: 5 MMOL/L (ref 3–14)
AST SERPL W P-5'-P-CCNC: 16 U/L (ref 0–45)
BASOPHILS # BLD AUTO: 0 10E3/UL (ref 0–0.2)
BASOPHILS NFR BLD AUTO: 1 %
BILIRUB SERPL-MCNC: 0.6 MG/DL (ref 0.2–1.3)
BUN SERPL-MCNC: 32 MG/DL (ref 7–30)
CALCIUM SERPL-MCNC: 9.1 MG/DL (ref 8.5–10.1)
CHLORIDE BLD-SCNC: 110 MMOL/L (ref 94–109)
CO2 SERPL-SCNC: 25 MMOL/L (ref 20–32)
CREAT SERPL-MCNC: 0.75 MG/DL (ref 0.52–1.04)
EOSINOPHIL # BLD AUTO: 0 10E3/UL (ref 0–0.7)
EOSINOPHIL NFR BLD AUTO: 1 %
ERYTHROCYTE [DISTWIDTH] IN BLOOD BY AUTOMATED COUNT: 17.8 % (ref 10–15)
GFR SERPL CREATININE-BSD FRML MDRD: >90 ML/MIN/1.73M2
GLUCOSE BLD-MCNC: 94 MG/DL (ref 70–99)
HCT VFR BLD AUTO: 37.2 % (ref 35–47)
HGB BLD-MCNC: 12.6 G/DL (ref 11.7–15.7)
IMM GRANULOCYTES # BLD: 0.1 10E3/UL
IMM GRANULOCYTES NFR BLD: 4 %
LYMPHOCYTES # BLD AUTO: 0.9 10E3/UL (ref 0.8–5.3)
LYMPHOCYTES NFR BLD AUTO: 45 %
MCH RBC QN AUTO: 31.1 PG (ref 26.5–33)
MCHC RBC AUTO-ENTMCNC: 33.9 G/DL (ref 31.5–36.5)
MCV RBC AUTO: 92 FL (ref 78–100)
MONOCYTES # BLD AUTO: 0 10E3/UL (ref 0–1.3)
MONOCYTES NFR BLD AUTO: 1 %
NEUTROPHILS # BLD AUTO: 1 10E3/UL (ref 1.6–8.3)
NEUTROPHILS NFR BLD AUTO: 48 %
NRBC # BLD AUTO: 0 10E3/UL
NRBC BLD AUTO-RTO: 0 /100
PLATELET # BLD AUTO: 221 10E3/UL (ref 150–450)
POTASSIUM BLD-SCNC: 4.3 MMOL/L (ref 3.4–5.3)
PROT SERPL-MCNC: 7.4 G/DL (ref 6.8–8.8)
RBC # BLD AUTO: 4.05 10E6/UL (ref 3.8–5.2)
SODIUM SERPL-SCNC: 140 MMOL/L (ref 133–144)
WBC # BLD AUTO: 2.1 10E3/UL (ref 4–11)

## 2022-07-20 PROCEDURE — 80053 COMPREHEN METABOLIC PANEL: CPT

## 2022-07-20 PROCEDURE — 36415 COLL VENOUS BLD VENIPUNCTURE: CPT

## 2022-07-20 PROCEDURE — 85025 COMPLETE CBC W/AUTO DIFF WBC: CPT

## 2022-07-21 DIAGNOSIS — A08.39 CMV COLITIS (H): ICD-10-CM

## 2022-07-21 DIAGNOSIS — B25.9 CMV COLITIS (H): ICD-10-CM

## 2022-07-21 LAB
CMV DNA IU/ML, INSTRUMENT: 259 IU/ML
CMV DNA SPEC NAA+PROBE-LOG#: 2.4 {LOG_COPIES}/ML

## 2022-07-21 RX ORDER — VALGANCICLOVIR 450 MG/1
1350 TABLET, FILM COATED ORAL 2 TIMES DAILY
Qty: 360 TABLET | Refills: 0 | COMMUNITY
Start: 2022-07-08 | End: 2022-08-15

## 2022-07-26 ENCOUNTER — VIRTUAL VISIT (OUTPATIENT)
Dept: INFECTIOUS DISEASES | Facility: CLINIC | Age: 57
End: 2022-07-26
Attending: INTERNAL MEDICINE
Payer: COMMERCIAL

## 2022-07-26 DIAGNOSIS — B25.9 CYTOMEGALOVIRUS (CMV) VIREMIA (H): ICD-10-CM

## 2022-07-26 DIAGNOSIS — Z23 NEED FOR VACCINATION: ICD-10-CM

## 2022-07-26 DIAGNOSIS — Z94.4 LIVER REPLACED BY TRANSPLANT (H): ICD-10-CM

## 2022-07-26 DIAGNOSIS — B25.9 CMV COLITIS (H): Primary | ICD-10-CM

## 2022-07-26 DIAGNOSIS — R22.0 FACIAL SWELLING: ICD-10-CM

## 2022-07-26 DIAGNOSIS — A08.39 CMV COLITIS (H): Primary | ICD-10-CM

## 2022-07-26 PROCEDURE — 99214 OFFICE O/P EST MOD 30 MIN: CPT | Mod: GT | Performed by: INTERNAL MEDICINE

## 2022-07-26 NOTE — PATIENT INSTRUCTIONS
CMV PCR weekly with other blood work.   Urinalysis tomorrow.   Please call [phone] to schedule an appointment with me in 2 months, virtual or in-person.    Thank you,   Forest Dasilva MD

## 2022-07-26 NOTE — PROGRESS NOTES
Berta Nava  is being evaluated via a billable video visit.      How would you like to obtain your AVS? [employer]  For the video visit, send the invitation by: Text to cell phone: [phone]  Will anyone else be joining your video visit? No      Start time 4:54 PM  End time 5:04 PM  [facility]    Transplant Infectious Diseases Outpatient Consultation     Patient:  Berta Nava, Date of birth 1965, Medical record number [id number]  Date of Visit:  07/26/2022  Consult requested by Dr. Leventhal for evaluation of CMV colitis.           Recommendations:   1. Continue VGCV 1350 mg bid for now.   - further recommendations depend on future CMV viral load. But what I have in mind is 900 mg bid once CMV is <2.1 log then 900 mg daily if repeat CMV is >2.1 log a week later then stop VGCV if repeat CMV is still <2.1 log a week later, then continue to monitor CMV PCR weekly for one month off of VGCV.   2. Continue CMV PCR weekly.   3. Keep IS as low as safely possible.   4. Once CMV is under control will address the need for the COVID-19 booster vaccine, the shingrix vaccine and the pneumonia vaccine.     RTC: 2 months, virtual or in-person.         Summary of Presentation:   Transplants:  11/18/2021 (Liver), Postoperative day:  250     This patient is a 56 year old female with PBC-liver cirrhosis s/p OLT in 11/2021. Was on TAC/prednisone then MMF was re-introduced due to mild rejection.   MMF was again discontinued after CMV infection.   Currently on TAC/prednisone.   History of UC with screening colonoscopy showing an evidence of CMV colitis. CMV PCR checked and was at 4.8 log.         Active Problems and Infectious Diseases Issues:   1. CMV viremia and colitis.   Primary, donor-derived CMV infection and disease.   This followed an escalation of IS due to mild rejection.   MMF was discontinued.     VGCV induction therapy was started on 6/15/2022 and increased to 1350 mg  "bid due to slow response (1.3 log decline over 3 weeks).   The CMV continues to decline.     Will continue high dose VGCV until CMV viral load is <2.1 log then gradually keep decreasing the dose until we stop VGCV and monitor CMV PCR off of VGCV for at least one month.     2. Facial swelling.   Will check UA to rule out proteinuria.   CMP is within normal limits.         Old Problems and Infectious Diseases Issues:   1. SBP and BSI with S mitis 4/29/2021; on IV ABx since 4/29/2021 for possible association with liver abscess.    2. Indeterminate QuantiFeron due to low reaction to mitogen likely due to the patient's underlying liver disease. This patient is low risk for TB, CXR unremarkable; no further evaluations were deemed necessary.     Other Infectious Disease issues include:  - QTc: 465 as of 11/2021.   - PCP prophylaxis: bactrim until 4/24/2022 (5-6 months).   - Serostatus: CMV D+/R-, EBV D+/R+, HSV1?/2?, VZV ?, received VGCV for 5-6 months until 4/24/2022.   - Immunization status: This patient received the third dose of the COVID-19 vaccine on 3/21/2022, she also received evusheld on 5/31/2022. Otherwise she needs to repeat the shingrix vaccine series since she only received one dose on 6/21/2021. Will need conjugated pneumonia vaccine.    - Gamma globulin status: ?      Attestation:  Total duration of visit including chart review, reviewing labs and imaging, interviewing and examining the patient, documentation, and sending communication to the patient and to the primary treating team, all at the same day of this encounter, is: 30 minutes.   Forest Dasilva MD    Contact information available via [facility] Paging/Directory     07/26/2022           Interim History:   Complaining of facial \"fuffiness\" and loss of taste since VGCV was increased to 1350 mg bid.   Multiple COVID-19 tests were negative. No fever. No cough.         History of the Infectious Disease lllness:       Transplants:  11/18/2021 (Liver); " Postoperative day:  250    55 yo female with PBC-liver cirrhosis s/p OLT in 11/2021. Also history of UC for which she undergoes screening colonoscopy.   During the last screening colonoscopy on 6/8/2022, she was found to have a single, 2mm cecal ulcer. The biopsy was c/w CMV infection.   The patient has/had no fever, diarrhea, N/V or abdominal pain.     The patient had mild ACR on a liver biopsy 5/2022, MMF was readminister and TAC/prednisone were maintained.     Exposure History:  Lives with her  in a single family house in the [facility] area.   Does some gardening but wear gloves.   Traveled to [facility] in the past.   The only international travel was to [place], to a resort only.   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   Has two grown up children, do not live with her.   Has a dog.          Review of Systems:   As mentioned in the interim history otherwise negative by reviewing constitutional symptoms, central and peripheral neurological systems, respiratory system, cardiac system, GI system,  system, musculoskeletal, skin, allergy, and lymphatics.           Immunizations:     Immunization History   Administered Date(s) Administered     COVID-19,PF,Moderna 03/18/2021, 04/15/2021, 03/21/2022     Flu, Unspecified 10/18/2016, 11/01/2017     HepA-Adult 05/12/2021     HepB-Adult 05/11/2021     Influenza (IIV3) PF 11/10/2010     Influenza Vaccine IM > 6 months Valent IIV4 (Alfuria,Fluzone) 09/03/2015, 12/30/2019, 09/15/2020     Influenza Vaccine, 6+MO IM (QUADRIVALENT W/PRESERVATIVES) 10/17/2018     Pneumo Conj 13-V (2010&after) 06/12/2021     Td (Adult), Adsorbed 03/06/1995, 05/27/2005     Tdap (Adacel,Boostrix) 07/01/2014     Twinrix A/B 06/12/2021     Zoster vaccine recombinant adjuvanted (SHINGRIX) 06/21/2021             Allergies:     Allergies   Allergen Reactions     Diagnostic X-Ray Materials Hives     PATIENT HAD HIVE REACTION AFTER ADMINISTERING CT CONTRAST DYE.       Contrast Dye               Medications:     Current Outpatient Medications   Medication Sig     amLODIPine (NORVASC) 5 MG tablet TAKE 2 TABLETS(10 MG) BY MOUTH DAILY     aspirin (ASA) 81 MG EC tablet Take 1 tablet (81 mg) by mouth daily     magnesium oxide (MAG-OX) 400 MG tablet Take 1 tablet (400 mg) by mouth 2 times daily     multivitamin (CENTRUM SILVER) tablet Take 1 tablet by mouth daily     predniSONE (DELTASONE) 5 MG tablet Take 2 tablets (10 mg) by mouth daily     tacrolimus (GENERIC EQUIVALENT) 1 MG capsule Take 3 capsules (3 mg) by mouth every 12 hours     ursodiol (ACTIGALL) 300 MG capsule Take 1 capsule (300 mg) by mouth 2 times daily     valGANciclovir (VALCYTE) 450 MG tablet Take 3 tablets (1,350 mg) by mouth 2 times daily     No current facility-administered medications for this visit.            Physical Exam:   No vitals are available during this virtual visit.   The patient was alert and oriented and in no apparent distress during this video visit. She did not cough. I did not notice facial swelling.            Laboratory Data:     No results found for: ACD4    Inflammatory Markers    Recent Labs   Lab Test 11/17/21  1908 08/19/21  0018 06/08/21  1602 06/01/21  0915 05/25/21  1455 05/21/21  1500 05/18/21  1010   CRP 54.0* 40.0* 23.1* 16.1* 14.3* 16.1* 21.4*       Immune Globulin Studies    No lab results found.    Metabolic Studies    Recent Labs   Lab Test 07/20/22  0805 07/13/22  0758 07/06/22  0801 06/29/22  0800 06/22/22  0925 06/06/22  0818 12/06/21  1031 12/03/21  1145 11/23/21  0551 11/23/21  0538 11/19/21  0127 11/19/21  0126    139 136 141 139 143   < > 142   < >  --    < >  --    POTASSIUM 4.3 4.0 4.0 4.3 3.7 3.8   < > 4.4   < >  --    < >  --    CHLORIDE 110* 109 104 110* 111* 114*   < > 113*   < >  --    < >  --    CO2 25 26 23 22 19* 22   < > 23   < >  --    < >  --    ANIONGAP 5 4 9 9 9 7   < > 6   < >  --    < >  --    BUN 32* 25 26 32* 26 26   < > 24   < >  --    < >  --    CR 0.75 0.79 0.79 0.88 0.77  0.78   < > 0.65   < >  --    < >  --    GFRESTIMATED >90 86 86 76 89 88   < > >90   < >  --    < >  --    GLC 94 89 89 89 110* 87   < > 97   < >  --    < >  --    A1C  --   --   --   --   --   --   --   --   --   --   --  5.4   MARIELENA 9.1 9.2 8.5 9.0 8.4* 9.0   < > 8.4*   < >  --    < >  --    PHOS  --   --   --   --  4.0 4.4   < > 3.5   < > 1.9*   < >  --    MAG  --   --  2.2  --  1.2* 1.8   < > 1.7   < > 2.2   < >  --    LACT  --   --   --   --   --   --   --  0.6*  --  0.8   < >  --     < > = values in this interval not displayed.       Hepatic Studies    Recent Labs   Lab Test 07/20/22  0805 07/13/22  0758 07/06/22  0801 06/29/22  0800 06/22/22  0925 06/06/22  0818   BILITOTAL 0.6 0.6 0.5 0.4 0.5 0.4   ALKPHOS 57 66 70 76 75 73   PROTTOTAL 7.4 7.6 7.4 7.6 7.5 7.6   ALBUMIN 3.7 4.0 3.8 3.8 4.0 3.8   AST 16 26 21 22 33 31   ALT 32 46 34 48 72* 59*       Hematology Studies     Recent Labs   Lab Test 07/20/22  0805 07/13/22  0758 07/06/22  0801 06/29/22  0800 06/22/22  0925 06/06/22  0818 06/21/21  0938 06/12/21  1259 06/11/21  1134 06/10/21  0702 06/09/21  1527 06/08/21  1602 06/01/21  0915   WBC 2.1* 2.3* 2.2* 2.9* 3.3* 3.7*   < > 21.3*   < > 22.8* 27.5*   < > 11.5*   ANEU  --  1.4* 1.2*  --   --   --   --  18.5*  --  22.0* 25.9*  --  9.3*   ALYM  --  0.9 0.9  --   --   --   --  1.1  --  0.8 0.6*  --  0.9   BRIGIDO  --  0.0 0.1  --   --   --   --  1.5*  --  0.0 0.8  --  0.9   AEOS  --  0.0 0.0  --   --   --   --  0.0  --  0.0 0.0  --  0.2   HGB 12.6 12.9 12.1 11.5* 11.7 11.4*   < > 10.3*   < > 9.8* 11.6*   < > 11.3*   HCT 37.2 38.9 37.9 34.2* 33.7* 33.5*   < > 32.3*   < > 30.6* 36.6   < > 34.9*    218 204 199 175 137*   < > 291   < > 270 431   < > 386    < > = values in this interval not displayed.       Clotting Studies    Recent Labs   Lab Test 05/17/22  1011 12/13/21  1022 12/03/21  1145 11/22/21  0419 11/21/21  0318 11/20/21  0508   INR 1.09 1.12 1.13 1.22* 1.34*  --    PTT  --   --  36 29 31 33       Urine  Studies    Recent Labs   Lab Test 12/03/21  1342 11/24/21 2014 11/20/21  1030 11/06/21  0509 10/25/21  1339 09/01/21  1757   URINEPH 6.0 5.0 5.0 5.5 5.5 5.5   NITRITE Negative Negative Negative Negative Negative Negative   LEUKEST Negative Negative Negative Negative Negative Negative   WBCU 1  --  3 1 1 2         Microbiology:  Last 6 Culture results with specimen source  Culture Micro   Date Value Ref Range Status   06/10/2021 No growth  Final   06/10/2021 Canceled, Test credited  Final   06/10/2021 Duplicate request  Final   06/10/2021   Final    Notification of test cancellation was given to  Christine Torres RN from . 6/10/21 at 0812.TV.     06/10/2021 No growth  Final   06/09/2021 No growth  Final    Specimen Description   Date Value Ref Range Status   06/10/2021 Ascites Fluid  Final   06/10/2021 Ascites Fluid  Final   06/10/2021 Midstream Urine  Final   06/10/2021 Midstream Urine  Final   06/09/2021 Blood Left Arm  Final   06/09/2021 Blood PICC  Final      No results found for: CMV    Last check of C difficile  C Diff Toxin B PCR   Date Value Ref Range Status   05/16/2018 Negative NEG^Negative Final     Comment:     Negative: Clostridium difficile target DNA sequences NOT detected, presumed   negative for Clostridium difficile toxin B or the number of bacteria present   may be below the limit of detection for the test.  FDA approved assay performed using [employer] GeneXpert real-time PCR.  A negative result does not exclude actual disease due to Clostridium difficile   and may be due to improper collection, handling and storage of the specimen   or the number of organisms in the specimen is below the detection limit of the   assay.       C Difficile Toxin B by PCR   Date Value Ref Range Status   12/03/2021 Negative Negative Final     Comment:     A negative result does not exclude actual disease due to C. difficile and may be due to improper collection, handling and storage of the specimen or the number of  organisms in the specimen is below the detection limit of the assay.         Virology:  CMV viral loads    CMV viral loads    CMV DNA IU/mL, Instrument   Date Value Ref Range Status   07/20/2022 259 (H) <1 IU/mL Final   07/13/2022 932 (H) <1 IU/mL Final   07/06/2022 3,537 (H) <1 IU/mL Final   06/29/2022 8,710 (H) <1 IU/mL Final   06/22/2022 23,162 (H) <1 IU/mL Final   06/14/2022 60,036 (H) <1 IU/mL Final     CMV viral loads    Recent Labs   Lab Test 07/20/22  0805 07/13/22  0758 07/06/22  0801 06/29/22  0800 06/22/22  0925 06/14/22  1621   CMVRESINST 259* 932* 3,537* 8,710* 23,162* 60,036*   CMVLOG 2.4 3.0 3.5 3.9 4.4 4.8       CMV viral loads    CMV DNA IU/mL, Instrument   Date Value Ref Range Status   07/20/2022 259 (H) <1 IU/mL Final   07/13/2022 932 (H) <1 IU/mL Final   07/06/2022 3,537 (H) <1 IU/mL Final   06/29/2022 8,710 (H) <1 IU/mL Final   06/22/2022 23,162 (H) <1 IU/mL Final   06/14/2022 60,036 (H) <1 IU/mL Final     CMV log   Date Value Ref Range Status   07/20/2022 2.4  Final   07/13/2022 3.0  Final   07/06/2022 3.5  Final   06/29/2022 3.9  Final   06/22/2022 4.4  Final   06/14/2022 4.8  Final       CMV resistance testing  No lab results found.  No results found for: CMVCID, CMVFOS, CMVGAN     EBV viral loads   No lab results found.  No results found for: EBVDN, EBRES, EBVDN, EBVSP, EBVPC, EBVPCR    Human Herpes Virus 6 viral loads    No results found for: H6RES No results found for: H6SPEC    CMV Antibody IgG   Date Value Ref Range Status   11/17/2021 No detectable antibody. No detectable antibody.  Final   08/18/2021 No detectable antibody. No detectable antibody.  Final     CMV Antibody IgM   Date Value Ref Range Status   11/17/2021 Negative Negative Final   08/18/2021 Negative Negative Final     No results found for: EBIG2, EBIGM, EBVIGG, EBIGG, EBVAGN, CZ8684, TOXG      Pathology:  Colon bx 6/8/2022  Final Diagnosis   A.  CECAL ULCER, BIOPSY :  - Colonic mucosa with ulceration and necroinflammatory  debris  - POSITIVE for CMV by immunohistochemistry  - Negative for HSV by immunohistochemistry    B.  CECUM AND ASCENDING COLON, BIOPSIES :  - Colonic mucosa with mild crypt distortion and mild chronic inflammation (Re grade 1)  - Negative for dysplasia      C. HEPATIC FLEXURE AND TRANSVERSE COLON, BIOPSIES :  - Colonic mucosa with mild crypt distortion no significant inflammation (Re grade 0)  - Negative for dysplasia      D. DISTAL AND TRANSVERSE COLON, BIOPSIES :  - Colonic mucosa with no significant inflammation (Re grade 0)  - Negative for dysplasia     E. DESCENDING COLON, BIOPSIES :  - Colonic mucosa with mild crypt distortion and mild patchy chronic inflammation (Re grade 1)  - Negative for dysplasia      F. RECTAL BIOPSIES :  - Colonic mucosa with no significant inflammation (Re grade 0)  - Negative for dysplasia      Liver biopsy 5/17/2022  Final Diagnosis   A.  LIVER, ALLOGRAFT, NEEDLE CORE BIOPSY:  - Mild focal portal inflammation indeterminate for acute cellular rejection (CH[facility]: 2-3/9)

## 2022-07-26 NOTE — LETTER
7/26/2022       RE: Berta Nava  [address] [facility] [address]     Dear Colleague,    Thank you for referring your patient, Berta Nava, to the [facility] INFECTIOUS DISEASE CLINIC [place] at [facility]. Please see a copy of my visit note below.    Berta Nava  is being evaluated via a billable video visit.      How would you like to obtain your AVS? Jumblets  For the video visit, send the invitation by: Text to cell phone: [phone]  Will anyone else be joining your video visit? No      Start time 4:54 PM  End time 5:04 PM  Home  [facility]    Transplant Infectious Diseases Outpatient Consultation     Patient:  Berta Nava, Date of birth 1965, Medical record number [id number]  Date of Visit:  07/26/2022  Consult requested by Dr. Leventhal for evaluation of CMV colitis.           Recommendations:   1. Continue VGCV 1350 mg bid for now.   - further recommendations depend on future CMV viral load. But what I have in mind is 900 mg bid once CMV is <2.1 log then 900 mg daily if repeat CMV is >2.1 log a week later then stop VGCV if repeat CMV is still <2.1 log a week later, then continue to monitor CMV PCR weekly for one month off of VGCV.   2. Continue CMV PCR weekly.   3. Keep IS as low as safely possible.   4. Once CMV is under control will address the need for the COVID-19 booster vaccine, the shingrix vaccine and the pneumonia vaccine.     RTC: 2 months, virtual or in-person.         Summary of Presentation:   Transplants:  11/18/2021 (Liver), Postoperative day:  250     This patient is a 56 year old female with PBC-liver cirrhosis s/p OLT in 11/2021. Was on TAC/prednisone then MMF was re-introduced due to mild rejection.   MMF was again discontinued after CMV infection.   Currently on TAC/prednisone.   History of UC with screening colonoscopy showing an evidence of CMV colitis. CMV  PCR checked and was at 4.8 log.         Active Problems and Infectious Diseases Issues:   1. CMV viremia and colitis.   Primary, donor-derived CMV infection and disease.   This followed an escalation of IS due to mild rejection.   MMF was discontinued.     VGCV induction therapy was started on 6/15/2022 and increased to 1350 mg bid due to slow response (1.3 log decline over 3 weeks).   The CMV continues to decline.     Will continue high dose VGCV until CMV viral load is <2.1 log then gradually keep decreasing the dose until we stop VGCV and monitor CMV PCR off of VGCV for at least one month.     2. Facial swelling.   Will check UA to rule out proteinuria.   CMP is within normal limits.         Old Problems and Infectious Diseases Issues:   1. SBP and BSI with S mitis 4/29/2021; on IV ABx since 4/29/2021 for possible association with liver abscess.    2. Indeterminate QuantiFeron due to low reaction to mitogen likely due to the patient's underlying liver disease. This patient is low risk for TB, CXR unremarkable; no further evaluations were deemed necessary.     Other Infectious Disease issues include:  - QTc: 465 as of 11/2021.   - PCP prophylaxis: bactrim until 4/24/2022 (5-6 months).   - Serostatus: CMV D+/R-, EBV D+/R+, HSV1?/2?, VZV ?, received VGCV for 5-6 months until 4/24/2022.   - Immunization status: This patient received the third dose of the COVID-19 vaccine on 3/21/2022, she also received evusheld on 5/31/2022. Otherwise she needs to repeat the shingrix vaccine series since she only received one dose on 6/21/2021. Will need conjugated pneumonia vaccine.    - Gamma globulin status: ?      Attestation:  Total duration of visit including chart review, reviewing labs and imaging, interviewing and examining the patient, documentation, and sending communication to the patient and to the primary treating team, all at the same day of this encounter, is: 30 minutes.   Forest Dasilva MD    Contact information  "available via [facility] Paging/Directory     07/26/2022           Interim History:   Complaining of facial \"fuffiness\" and loss of taste since VGCV was increased to 1350 mg bid.   Multiple COVID-19 tests were negative. No fever. No cough.         History of the Infectious Disease lllness:       Transplants:  11/18/2021 (Liver); Postoperative day:  250    57 yo female with PBC-liver cirrhosis s/p OLT in 11/2021. Also history of UC for which she undergoes screening colonoscopy.   During the last screening colonoscopy on 6/8/2022, she was found to have a single, 2mm cecal ulcer. The biopsy was c/w CMV infection.   The patient has/had no fever, diarrhea, N/V or abdominal pain.     The patient had mild ACR on a liver biopsy 5/2022, MMF was readminister and TAC/prednisone were maintained.     Exposure History:  Lives with her  in a single family house in the [facility] [facility] area.   Does some gardening but wear gloves.   Traveled to [facility] in the past.   The only international travel was to [place], to a resort only.   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   Has two grown up children, do not live with her.   Has a dog.          Review of Systems:   As mentioned in the interim history otherwise negative by reviewing constitutional symptoms, central and peripheral neurological systems, respiratory system, cardiac system, GI system,  system, musculoskeletal, skin, allergy, and lymphatics.           Immunizations:     Immunization History   Administered Date(s) Administered     COVID-19,PF,Moderna 03/18/2021, 04/15/2021, 03/21/2022     Flu, Unspecified 10/18/2016, 11/01/2017     HepA-Adult 05/12/2021     HepB-Adult 05/11/2021     Influenza (IIV3) PF 11/10/2010     Influenza Vaccine IM > 6 months Valent IIV4 (Alfuria,Fluzone) 09/03/2015, 12/30/2019, 09/15/2020     Influenza Vaccine, 6+MO IM (QUADRIVALENT W/PRESERVATIVES) 10/17/2018     Pneumo Conj 13-V (2010&after) 06/12/2021     Td (Adult), Adsorbed 03/06/1995, 05/27/2005     " Tdap (Adacel,Boostrix) 07/01/2014     Twinrix A/B 06/12/2021     Zoster vaccine recombinant adjuvanted (SHINGRIX) 06/21/2021             Allergies:     Allergies   Allergen Reactions     Diagnostic X-Ray Materials Hives     PATIENT HAD HIVE REACTION AFTER ADMINISTERING CT CONTRAST DYE.       Contrast Dye              Medications:     Current Outpatient Medications   Medication Sig     amLODIPine (NORVASC) 5 MG tablet TAKE 2 TABLETS(10 MG) BY MOUTH DAILY     aspirin (ASA) 81 MG EC tablet Take 1 tablet (81 mg) by mouth daily     magnesium oxide (MAG-OX) 400 MG tablet Take 1 tablet (400 mg) by mouth 2 times daily     multivitamin (CENTRUM SILVER) tablet Take 1 tablet by mouth daily     predniSONE (DELTASONE) 5 MG tablet Take 2 tablets (10 mg) by mouth daily     tacrolimus (GENERIC EQUIVALENT) 1 MG capsule Take 3 capsules (3 mg) by mouth every 12 hours     ursodiol (ACTIGALL) 300 MG capsule Take 1 capsule (300 mg) by mouth 2 times daily     valGANciclovir (VALCYTE) 450 MG tablet Take 3 tablets (1,350 mg) by mouth 2 times daily     No current facility-administered medications for this visit.            Physical Exam:   No vitals are available during this virtual visit.   The patient was alert and oriented and in no apparent distress during this video visit. She did not cough. I did not notice facial swelling.            Laboratory Data:     No results found for: ACD4    Inflammatory Markers    Recent Labs   Lab Test 11/17/21  1908 08/19/21  0018 06/08/21  1602 06/01/21  0915 05/25/21  1455 05/21/21  1500 05/18/21  1010   CRP 54.0* 40.0* 23.1* 16.1* 14.3* 16.1* 21.4*       Immune Globulin Studies    No lab results found.    Metabolic Studies    Recent Labs   Lab Test 07/20/22  0805 07/13/22  0758 07/06/22  0801 06/29/22  0800 06/22/22  0925 06/06/22  0818 12/06/21  1031 12/03/21  1145 11/23/21  0551 11/23/21  0538 11/19/21  0127 11/19/21  0126    139 136 141 139 143   < > 142   < >  --    < >  --    POTASSIUM 4.3  4.0 4.0 4.3 3.7 3.8   < > 4.4   < >  --    < >  --    CHLORIDE 110* 109 104 110* 111* 114*   < > 113*   < >  --    < >  --    CO2 25 26 23 22 19* 22   < > 23   < >  --    < >  --    ANIONGAP 5 4 9 9 9 7   < > 6   < >  --    < >  --    BUN 32* 25 26 32* 26 26   < > 24   < >  --    < >  --    CR 0.75 0.79 0.79 0.88 0.77 0.78   < > 0.65   < >  --    < >  --    GFRESTIMATED >90 86 86 76 89 88   < > >90   < >  --    < >  --    GLC 94 89 89 89 110* 87   < > 97   < >  --    < >  --    A1C  --   --   --   --   --   --   --   --   --   --   --  5.4   MARIELENA 9.1 9.2 8.5 9.0 8.4* 9.0   < > 8.4*   < >  --    < >  --    PHOS  --   --   --   --  4.0 4.4   < > 3.5   < > 1.9*   < >  --    MAG  --   --  2.2  --  1.2* 1.8   < > 1.7   < > 2.2   < >  --    LACT  --   --   --   --   --   --   --  0.6*  --  0.8   < >  --     < > = values in this interval not displayed.       Hepatic Studies    Recent Labs   Lab Test 07/20/22 0805 07/13/22 0758 07/06/22  0801 06/29/22  0800 06/22/22  0925 06/06/22  0818   BILITOTAL 0.6 0.6 0.5 0.4 0.5 0.4   ALKPHOS 57 66 70 76 75 73   PROTTOTAL 7.4 7.6 7.4 7.6 7.5 7.6   ALBUMIN 3.7 4.0 3.8 3.8 4.0 3.8   AST 16 26 21 22 33 31   ALT 32 46 34 48 72* 59*       Hematology Studies     Recent Labs   Lab Test 07/20/22  0805 07/13/22  0758 07/06/22  0801 06/29/22  0800 06/22/22  0925 06/06/22  0818 06/21/21  0938 06/12/21  1259 06/11/21  1134 06/10/21  0702 06/09/21  1527 06/08/21  1602 06/01/21  0915   WBC 2.1* 2.3* 2.2* 2.9* 3.3* 3.7*   < > 21.3*   < > 22.8* 27.5*   < > 11.5*   ANEU  --  1.4* 1.2*  --   --   --   --  18.5*  --  22.0* 25.9*  --  9.3*   ALYM  --  0.9 0.9  --   --   --   --  1.1  --  0.8 0.6*  --  0.9   BRIGIDO  --  0.0 0.1  --   --   --   --  1.5*  --  0.0 0.8  --  0.9   AEOS  --  0.0 0.0  --   --   --   --  0.0  --  0.0 0.0  --  0.2   HGB 12.6 12.9 12.1 11.5* 11.7 11.4*   < > 10.3*   < > 9.8* 11.6*   < > 11.3*   HCT 37.2 38.9 37.9 34.2* 33.7* 33.5*   < > 32.3*   < > 30.6* 36.6   < > 34.9*     218 204 199 175 137*   < > 291   < > 270 431   < > 386    < > = values in this interval not displayed.       Clotting Studies    Recent Labs   Lab Test 05/17/22  1011 12/13/21  1022 12/03/21  1145 11/22/21  0419 11/21/21  0318 11/20/21  0508   INR 1.09 1.12 1.13 1.22* 1.34*  --    PTT  --   --  36 29 31 33       Urine Studies    Recent Labs   Lab Test 12/03/21  1342 11/24/21 2014 11/20/21  1030 11/06/21  0509 10/25/21  1339 09/01/21  1757   URINEPH 6.0 5.0 5.0 5.5 5.5 5.5   NITRITE Negative Negative Negative Negative Negative Negative   LEUKEST Negative Negative Negative Negative Negative Negative   WBCU 1  --  3 1 1 2         Microbiology:  Last 6 Culture results with specimen source  Culture Micro   Date Value Ref Range Status   06/10/2021 No growth  Final   06/10/2021 Canceled, Test credited  Final   06/10/2021 Duplicate request  Final   06/10/2021   Final    Notification of test cancellation was given to  Christine Torres RN from 7C. 6/10/21 at 0812.TV.     06/10/2021 No growth  Final   06/09/2021 No growth  Final    Specimen Description   Date Value Ref Range Status   06/10/2021 Ascites Fluid  Final   06/10/2021 Ascites Fluid  Final   06/10/2021 Midstream Urine  Final   06/10/2021 Midstream Urine  Final   06/09/2021 Blood Left Arm  Final   06/09/2021 Blood PICC  Final      No results found for: CMV    Last check of C difficile  C Diff Toxin B PCR   Date Value Ref Range Status   05/16/2018 Negative NEG^Negative Final     Comment:     Negative: Clostridium difficile target DNA sequences NOT detected, presumed   negative for Clostridium difficile toxin B or the number of bacteria present   may be below the limit of detection for the test.  FDA approved assay performed using [employer] GeneXpert real-time PCR.  A negative result does not exclude actual disease due to Clostridium difficile   and may be due to improper collection, handling and storage of the specimen   or the number of organisms in the specimen is below  the detection limit of the   assay.       C Difficile Toxin B by PCR   Date Value Ref Range Status   12/03/2021 Negative Negative Final     Comment:     A negative result does not exclude actual disease due to C. difficile and may be due to improper collection, handling and storage of the specimen or the number of organisms in the specimen is below the detection limit of the assay.         Virology:  CMV viral loads    CMV viral loads    CMV DNA IU/mL, Instrument   Date Value Ref Range Status   07/20/2022 259 (H) <1 IU/mL Final   07/13/2022 932 (H) <1 IU/mL Final   07/06/2022 3,537 (H) <1 IU/mL Final   06/29/2022 8,710 (H) <1 IU/mL Final   06/22/2022 23,162 (H) <1 IU/mL Final   06/14/2022 60,036 (H) <1 IU/mL Final     CMV viral loads    Recent Labs   Lab Test 07/20/22  0805 07/13/22  0758 07/06/22  0801 06/29/22  0800 06/22/22  0925 06/14/22  1621   CMVRESINST 259* 932* 3,537* 8,710* 23,162* 60,036*   CMVLOG 2.4 3.0 3.5 3.9 4.4 4.8       CMV viral loads    CMV DNA IU/mL, Instrument   Date Value Ref Range Status   07/20/2022 259 (H) <1 IU/mL Final   07/13/2022 932 (H) <1 IU/mL Final   07/06/2022 3,537 (H) <1 IU/mL Final   06/29/2022 8,710 (H) <1 IU/mL Final   06/22/2022 23,162 (H) <1 IU/mL Final   06/14/2022 60,036 (H) <1 IU/mL Final     CMV log   Date Value Ref Range Status   07/20/2022 2.4  Final   07/13/2022 3.0  Final   07/06/2022 3.5  Final   06/29/2022 3.9  Final   06/22/2022 4.4  Final   06/14/2022 4.8  Final       CMV resistance testing  No lab results found.  No results found for: CMVCID, CMVFOS, CMVGAN     EBV viral loads   No lab results found.  No results found for: EBVDN, EBRES, EBVDN, EBVSP, EBVPC, EBVPCR    Human Herpes Virus 6 viral loads    No results found for: H6RES No results found for: H6SPEC    CMV Antibody IgG   Date Value Ref Range Status   11/17/2021 No detectable antibody. No detectable antibody.  Final   08/18/2021 No detectable antibody. No detectable antibody.  Final     CMV Antibody IgM    Date Value Ref Range Status   11/17/2021 Negative Negative Final   08/18/2021 Negative Negative Final     No results found for: EBIG2, EBIGM, EBVIGG, EBIGG, EBVAGN, FM7580, TOXG      Pathology:  Colon bx 6/8/2022  Final Diagnosis   A.  CECAL ULCER, BIOPSY :  - Colonic mucosa with ulceration and necroinflammatory debris  - POSITIVE for CMV by immunohistochemistry  - Negative for HSV by immunohistochemistry    B.  CECUM AND ASCENDING COLON, BIOPSIES :  - Colonic mucosa with mild crypt distortion and mild chronic inflammation (Re grade 1)  - Negative for dysplasia      C. HEPATIC FLEXURE AND TRANSVERSE COLON, BIOPSIES :  - Colonic mucosa with mild crypt distortion no significant inflammation (Re grade 0)  - Negative for dysplasia      D. DISTAL AND TRANSVERSE COLON, BIOPSIES :  - Colonic mucosa with no significant inflammation (Re grade 0)  - Negative for dysplasia     E. DESCENDING COLON, BIOPSIES :  - Colonic mucosa with mild crypt distortion and mild patchy chronic inflammation (Re grade 1)  - Negative for dysplasia      F. RECTAL BIOPSIES :  - Colonic mucosa with no significant inflammation (Re grade 0)  - Negative for dysplasia      Liver biopsy 5/17/2022  Final Diagnosis   A.  LIVER, ALLOGRAFT, NEEDLE CORE BIOPSY:  - Mild focal portal inflammation indeterminate for acute cellular rejection (CHIU: 2-3/9)

## 2022-07-27 ENCOUNTER — LAB (OUTPATIENT)
Dept: LAB | Facility: CLINIC | Age: 57
End: 2022-07-27
Payer: COMMERCIAL

## 2022-07-27 DIAGNOSIS — A08.39 CMV COLITIS (H): ICD-10-CM

## 2022-07-27 DIAGNOSIS — Z94.4 LIVER REPLACED BY TRANSPLANT (H): ICD-10-CM

## 2022-07-27 DIAGNOSIS — Z94.4 S/P LIVER TRANSPLANT (H): ICD-10-CM

## 2022-07-27 DIAGNOSIS — B25.9 CMV COLITIS (H): ICD-10-CM

## 2022-07-27 LAB
ALBUMIN UR-MCNC: NEGATIVE MG/DL
APPEARANCE UR: CLEAR
BASOPHILS # BLD MANUAL: 0 10E3/UL (ref 0–0.2)
BASOPHILS NFR BLD MANUAL: 0 %
BILIRUB UR QL STRIP: NEGATIVE
COLOR UR AUTO: YELLOW
EOSINOPHIL # BLD MANUAL: 0 10E3/UL (ref 0–0.7)
EOSINOPHIL NFR BLD MANUAL: 0 %
ERYTHROCYTE [DISTWIDTH] IN BLOOD BY AUTOMATED COUNT: 18.4 % (ref 10–15)
GLUCOSE UR STRIP-MCNC: NEGATIVE MG/DL
HCT VFR BLD AUTO: 36.8 % (ref 35–47)
HGB BLD-MCNC: 12.4 G/DL (ref 11.7–15.7)
HGB UR QL STRIP: NEGATIVE
KETONES UR STRIP-MCNC: NEGATIVE MG/DL
LEUKOCYTE ESTERASE UR QL STRIP: NEGATIVE
LYMPHOCYTES # BLD MANUAL: 0.9 10E3/UL (ref 0.8–5.3)
LYMPHOCYTES NFR BLD MANUAL: 45 %
MCH RBC QN AUTO: 31.4 PG (ref 26.5–33)
MCHC RBC AUTO-ENTMCNC: 33.7 G/DL (ref 31.5–36.5)
MCV RBC AUTO: 93 FL (ref 78–100)
MONOCYTES # BLD MANUAL: 0 10E3/UL (ref 0–1.3)
MONOCYTES NFR BLD MANUAL: 0 %
NEUTROPHILS # BLD MANUAL: 1.1 10E3/UL (ref 1.6–8.3)
NEUTROPHILS NFR BLD MANUAL: 55 %
NITRATE UR QL: NEGATIVE
NRBC # BLD AUTO: 0 10E3/UL
NRBC BLD MANUAL-RTO: 1 %
PH UR STRIP: 7 [PH] (ref 5–7)
PLAT MORPH BLD: ABNORMAL
PLATELET # BLD AUTO: 175 10E3/UL (ref 150–450)
RBC # BLD AUTO: 3.95 10E6/UL (ref 3.8–5.2)
RBC MORPH BLD: ABNORMAL
SP GR UR STRIP: 1.02 (ref 1–1.03)
TACROLIMUS BLD-MCNC: 7.3 UG/L (ref 5–15)
TME LAST DOSE: NORMAL H
TME LAST DOSE: NORMAL H
UROBILINOGEN UR STRIP-ACNC: 0.2 E.U./DL
WBC # BLD AUTO: 2 10E3/UL (ref 4–11)

## 2022-07-27 PROCEDURE — 80197 ASSAY OF TACROLIMUS: CPT

## 2022-07-27 PROCEDURE — 85027 COMPLETE CBC AUTOMATED: CPT

## 2022-07-27 PROCEDURE — 82248 BILIRUBIN DIRECT: CPT

## 2022-07-27 PROCEDURE — 80053 COMPREHEN METABOLIC PANEL: CPT

## 2022-07-27 PROCEDURE — 84100 ASSAY OF PHOSPHORUS: CPT

## 2022-07-27 PROCEDURE — 99000 SPECIMEN HANDLING OFFICE-LAB: CPT

## 2022-07-27 PROCEDURE — 85007 BL SMEAR W/DIFF WBC COUNT: CPT

## 2022-07-27 PROCEDURE — 83735 ASSAY OF MAGNESIUM: CPT

## 2022-07-27 PROCEDURE — 81003 URINALYSIS AUTO W/O SCOPE: CPT

## 2022-07-27 PROCEDURE — 87910 NFCT AGT GNTYP ALYS CMV: CPT | Mod: 90

## 2022-07-27 PROCEDURE — 36415 COLL VENOUS BLD VENIPUNCTURE: CPT

## 2022-07-28 LAB
ALBUMIN SERPL-MCNC: 3.8 G/DL (ref 3.4–5)
ALP SERPL-CCNC: 55 U/L (ref 40–150)
ALT SERPL W P-5'-P-CCNC: 28 U/L (ref 0–50)
ANION GAP SERPL CALCULATED.3IONS-SCNC: 7 MMOL/L (ref 3–14)
AST SERPL W P-5'-P-CCNC: 17 U/L (ref 0–45)
BILIRUB DIRECT SERPL-MCNC: 0.2 MG/DL (ref 0–0.2)
BILIRUB SERPL-MCNC: 0.5 MG/DL (ref 0.2–1.3)
BUN SERPL-MCNC: 23 MG/DL (ref 7–30)
CALCIUM SERPL-MCNC: 8.7 MG/DL (ref 8.5–10.1)
CHLORIDE BLD-SCNC: 111 MMOL/L (ref 94–109)
CMV DNA IU/ML, INSTRUMENT: 206 IU/ML
CMV DNA SPEC NAA+PROBE-LOG#: 2.3 {LOG_COPIES}/ML
CO2 SERPL-SCNC: 21 MMOL/L (ref 20–32)
CREAT SERPL-MCNC: 0.68 MG/DL (ref 0.52–1.04)
GFR SERPL CREATININE-BSD FRML MDRD: >90 ML/MIN/1.73M2
GLUCOSE BLD-MCNC: 84 MG/DL (ref 70–99)
MAGNESIUM SERPL-MCNC: 2 MG/DL (ref 1.6–2.3)
PHOSPHATE SERPL-MCNC: 3.3 MG/DL (ref 2.5–4.5)
POTASSIUM BLD-SCNC: 4.1 MMOL/L (ref 3.4–5.3)
PROT SERPL-MCNC: 7.2 G/DL (ref 6.8–8.8)
SODIUM SERPL-SCNC: 139 MMOL/L (ref 133–144)

## 2022-07-31 LAB
CIDOFOVIR ISLT GENOTYP: NORMAL
FOSCARNET ISLT GENOTYP: NORMAL
GANCICLOVIR ISLT GENOTYP: NORMAL

## 2022-08-01 ENCOUNTER — LAB (OUTPATIENT)
Dept: LAB | Facility: CLINIC | Age: 57
End: 2022-08-01
Payer: COMMERCIAL

## 2022-08-01 DIAGNOSIS — B25.9 CMV COLITIS (H): Primary | ICD-10-CM

## 2022-08-01 DIAGNOSIS — A08.39 CMV COLITIS (H): ICD-10-CM

## 2022-08-01 DIAGNOSIS — Z16.33 INFECTION DUE TO ANTIMICROBIAL RESISTANT VIRUS: ICD-10-CM

## 2022-08-01 DIAGNOSIS — A08.39 CMV COLITIS (H): Primary | ICD-10-CM

## 2022-08-01 DIAGNOSIS — B34.9 INFECTION DUE TO ANTIMICROBIAL RESISTANT VIRUS: ICD-10-CM

## 2022-08-01 DIAGNOSIS — B25.9 CYTOMEGALOVIRUS (CMV) VIREMIA (H): ICD-10-CM

## 2022-08-01 DIAGNOSIS — B25.9 CMV COLITIS (H): ICD-10-CM

## 2022-08-01 LAB
BASOPHILS # BLD MANUAL: 0 10E3/UL (ref 0–0.2)
BASOPHILS NFR BLD MANUAL: 0 %
EOSINOPHIL # BLD MANUAL: 0 10E3/UL (ref 0–0.7)
EOSINOPHIL NFR BLD MANUAL: 0 %
ERYTHROCYTE [DISTWIDTH] IN BLOOD BY AUTOMATED COUNT: 18.6 % (ref 10–15)
HCT VFR BLD AUTO: 36.2 % (ref 35–47)
HGB BLD-MCNC: 12 G/DL (ref 11.7–15.7)
LYMPHOCYTES # BLD MANUAL: 0.9 10E3/UL (ref 0.8–5.3)
LYMPHOCYTES NFR BLD MANUAL: 37 %
MCH RBC QN AUTO: 30.6 PG (ref 26.5–33)
MCHC RBC AUTO-ENTMCNC: 33.1 G/DL (ref 31.5–36.5)
MCV RBC AUTO: 92 FL (ref 78–100)
MONOCYTES # BLD MANUAL: 0 10E3/UL (ref 0–1.3)
MONOCYTES NFR BLD MANUAL: 0 %
NEUTROPHILS # BLD MANUAL: 1.4 10E3/UL (ref 1.6–8.3)
NEUTROPHILS NFR BLD MANUAL: 63 %
PLAT MORPH BLD: ABNORMAL
PLATELET # BLD AUTO: 156 10E3/UL (ref 150–450)
RBC # BLD AUTO: 3.92 10E6/UL (ref 3.8–5.2)
RBC MORPH BLD: ABNORMAL
WBC # BLD AUTO: 2.3 10E3/UL (ref 4–11)

## 2022-08-01 PROCEDURE — 85007 BL SMEAR W/DIFF WBC COUNT: CPT

## 2022-08-01 PROCEDURE — 36415 COLL VENOUS BLD VENIPUNCTURE: CPT

## 2022-08-01 PROCEDURE — 80053 COMPREHEN METABOLIC PANEL: CPT

## 2022-08-01 PROCEDURE — 85027 COMPLETE CBC AUTOMATED: CPT

## 2022-08-01 NOTE — PROGRESS NOTES
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   I will continue VGCV at high dose of 1350 mg bid and add letermovir.   The patient understands and agrees.     Forest Dasilva MD

## 2022-08-02 LAB
ALBUMIN SERPL-MCNC: 3.9 G/DL (ref 3.4–5)
ALP SERPL-CCNC: 52 U/L (ref 40–150)
ALT SERPL W P-5'-P-CCNC: 28 U/L (ref 0–50)
ANION GAP SERPL CALCULATED.3IONS-SCNC: 9 MMOL/L (ref 3–14)
AST SERPL W P-5'-P-CCNC: 18 U/L (ref 0–45)
BILIRUB SERPL-MCNC: 0.4 MG/DL (ref 0.2–1.3)
BUN SERPL-MCNC: 31 MG/DL (ref 7–30)
CALCIUM SERPL-MCNC: 9.1 MG/DL (ref 8.5–10.1)
CHLORIDE BLD-SCNC: 111 MMOL/L (ref 94–109)
CO2 SERPL-SCNC: 22 MMOL/L (ref 20–32)
CREAT SERPL-MCNC: 0.81 MG/DL (ref 0.52–1.04)
GFR SERPL CREATININE-BSD FRML MDRD: 84 ML/MIN/1.73M2
GLUCOSE BLD-MCNC: 93 MG/DL (ref 70–99)
POTASSIUM BLD-SCNC: 3.8 MMOL/L (ref 3.4–5.3)
PROT SERPL-MCNC: 7.4 G/DL (ref 6.8–8.8)
SODIUM SERPL-SCNC: 142 MMOL/L (ref 133–144)

## 2022-08-03 ENCOUNTER — LAB (OUTPATIENT)
Dept: LAB | Facility: CLINIC | Age: 57
End: 2022-08-03
Payer: COMMERCIAL

## 2022-08-03 DIAGNOSIS — A08.39 CMV COLITIS (H): ICD-10-CM

## 2022-08-03 DIAGNOSIS — B25.9 CMV COLITIS (H): ICD-10-CM

## 2022-08-03 PROCEDURE — 36415 COLL VENOUS BLD VENIPUNCTURE: CPT

## 2022-08-04 ENCOUNTER — TELEPHONE (OUTPATIENT)
Dept: TRANSPLANT | Facility: CLINIC | Age: 57
End: 2022-08-04

## 2022-08-04 DIAGNOSIS — Z16.33 INFECTION DUE TO ANTIMICROBIAL RESISTANT VIRUS: ICD-10-CM

## 2022-08-04 DIAGNOSIS — A08.39 CMV COLITIS (H): Primary | ICD-10-CM

## 2022-08-04 DIAGNOSIS — Z94.4 S/P LIVER TRANSPLANT (H): Primary | ICD-10-CM

## 2022-08-04 DIAGNOSIS — B25.9 CMV COLITIS (H): Primary | ICD-10-CM

## 2022-08-04 DIAGNOSIS — B25.9 CYTOMEGALOVIRUS (CMV) VIREMIA (H): ICD-10-CM

## 2022-08-04 DIAGNOSIS — B34.9 INFECTION DUE TO ANTIMICROBIAL RESISTANT VIRUS: ICD-10-CM

## 2022-08-04 LAB
CMV DNA IU/ML, INSTRUMENT: 211 IU/ML
CMV DNA SPEC NAA+PROBE-LOG#: 2.3 {LOG_COPIES}/ML

## 2022-08-04 NOTE — TELEPHONE ENCOUNTER
Call from Dr. Grewal.  He will be starting Berta on letermovir for her resistant CMV.  He wants to continue weekly labs (all standard post transplant labs w/ CMV PCR and Cbc w/ differential (make sure orders are in place for the next 2 months).  She will continue valcyte for now as well.  IF next week's CMV PCR < 137 can stop valcyte.  Will wean pred as follows:  Decrease to 7.5 mg daily for a week, then 5 mg for a week, then 2.5 mg for a week, then stop.    We will also run tac level 5-8.     
Instructed pt of the following:    Call from Dr. Grewal.  He will be starting Berta on letermovir for her resistant CMV.  He wants to continue weekly labs (all standard post transplant labs w/ CMV PCR and Cbc w/ differential (make sure orders are in place for the next 2 months).  She will continue valcyte for now as well.  IF next week's CMV PCR < 137 can stop valcyte.  Will wean pred as follows:  Decrease to 7.5 mg daily for a week, then 5 mg for a week, then 2.5 mg for a week, then stop.    We will also run tac level 5-8.    Pt was able to repeat instructions.    
no

## 2022-08-05 ENCOUNTER — TELEPHONE (OUTPATIENT)
Dept: TRANSPLANT | Facility: CLINIC | Age: 57
End: 2022-08-05

## 2022-08-05 NOTE — TELEPHONE ENCOUNTER
Call to Berta to check in.  She says that she lost her taste, face got swollen, has dry mouth since increasing valcyte dose.  started letermovir last Wednesday with valcyte last Wednesday.  If level is negative next week we will stop valcyte.    Last magnesium was 2 .  Told her to stop oral magnesium.

## 2022-08-08 ENCOUNTER — TELEPHONE (OUTPATIENT)
Dept: ONCOLOGY | Facility: CLINIC | Age: 57
End: 2022-08-08

## 2022-08-08 NOTE — TELEPHONE ENCOUNTER
PA Initiation    Medication: Livtencity - APPROVED  Insurance Company: [email or web address] [phone] Fax [phone]  Pharmacy Filling the Rx:    Filling Pharmacy Phone:    Filling Pharmacy Fax:    Start Date: 8/5/2022    Prior Authorization Approval    Authorization Effective Date: 8/8/2022  Authorization Expiration Date: 10/7/2022  Medication: Livtencity - APPROVED  Approved Dose/Quantity:   Reference #:     Insurance Company: [email or web address] [phone] Fax [phone]  Expected CoPay:       CoPay Card Available:      Foundation Assistance Needed:    Which Pharmacy is filling the prescription (Not needed for infusion/clinic administered):    Pharmacy Notified:    Patient Notified:          Rebecca Banerjee [facility] [address] [address]  Office: [phone]  Fax: [phone]  [email or web address]

## 2022-08-10 ENCOUNTER — LAB (OUTPATIENT)
Dept: LAB | Facility: CLINIC | Age: 57
End: 2022-08-10
Payer: COMMERCIAL

## 2022-08-10 DIAGNOSIS — B25.9 CMV COLITIS (H): ICD-10-CM

## 2022-08-10 DIAGNOSIS — Z94.4 S/P LIVER TRANSPLANT (H): ICD-10-CM

## 2022-08-10 DIAGNOSIS — A08.39 CMV COLITIS (H): ICD-10-CM

## 2022-08-10 LAB
ALBUMIN SERPL-MCNC: 3.6 G/DL (ref 3.4–5)
ALP SERPL-CCNC: 49 U/L (ref 40–150)
ALT SERPL W P-5'-P-CCNC: 30 U/L (ref 0–50)
ANION GAP SERPL CALCULATED.3IONS-SCNC: 8 MMOL/L (ref 3–14)
AST SERPL W P-5'-P-CCNC: 21 U/L (ref 0–45)
BASOPHILS # BLD MANUAL: 0 10E3/UL (ref 0–0.2)
BASOPHILS NFR BLD MANUAL: 0 %
BILIRUB DIRECT SERPL-MCNC: 0.2 MG/DL (ref 0–0.2)
BILIRUB SERPL-MCNC: 0.6 MG/DL (ref 0.2–1.3)
BUN SERPL-MCNC: 24 MG/DL (ref 7–30)
CALCIUM SERPL-MCNC: 8.8 MG/DL (ref 8.5–10.1)
CHLORIDE BLD-SCNC: 111 MMOL/L (ref 94–109)
CMV DNA IU/ML, INSTRUMENT: 467 IU/ML
CMV DNA SPEC NAA+PROBE-LOG#: 2.7 {LOG_COPIES}/ML
CO2 SERPL-SCNC: 23 MMOL/L (ref 20–32)
CREAT SERPL-MCNC: 0.85 MG/DL (ref 0.52–1.04)
EOSINOPHIL # BLD MANUAL: 0 10E3/UL (ref 0–0.7)
EOSINOPHIL NFR BLD MANUAL: 0 %
ERYTHROCYTE [DISTWIDTH] IN BLOOD BY AUTOMATED COUNT: 18.2 % (ref 10–15)
GFR SERPL CREATININE-BSD FRML MDRD: 79 ML/MIN/1.73M2
GLUCOSE BLD-MCNC: 91 MG/DL (ref 70–99)
HCT VFR BLD AUTO: 37.9 % (ref 35–47)
HGB BLD-MCNC: 12.7 G/DL (ref 11.7–15.7)
LYMPHOCYTES # BLD MANUAL: 1.2 10E3/UL (ref 0.8–5.3)
LYMPHOCYTES NFR BLD MANUAL: 54 %
MAGNESIUM SERPL-MCNC: 1.8 MG/DL (ref 1.6–2.3)
MCH RBC QN AUTO: 31.3 PG (ref 26.5–33)
MCHC RBC AUTO-ENTMCNC: 33.5 G/DL (ref 31.5–36.5)
MCV RBC AUTO: 93 FL (ref 78–100)
MONOCYTES # BLD MANUAL: 0.1 10E3/UL (ref 0–1.3)
MONOCYTES NFR BLD MANUAL: 5 %
NEUTROPHILS # BLD MANUAL: 0.9 10E3/UL (ref 1.6–8.3)
NEUTROPHILS NFR BLD MANUAL: 41 %
PHOSPHATE SERPL-MCNC: 3.3 MG/DL (ref 2.5–4.5)
PLAT MORPH BLD: ABNORMAL
PLATELET # BLD AUTO: 171 10E3/UL (ref 150–450)
POTASSIUM BLD-SCNC: 3.5 MMOL/L (ref 3.4–5.3)
PROT SERPL-MCNC: 6.9 G/DL (ref 6.8–8.8)
RBC # BLD AUTO: 4.06 10E6/UL (ref 3.8–5.2)
RBC MORPH BLD: ABNORMAL
SODIUM SERPL-SCNC: 142 MMOL/L (ref 133–144)
TACROLIMUS BLD-MCNC: 21 UG/L (ref 5–15)
TME LAST DOSE: ABNORMAL H
TME LAST DOSE: ABNORMAL H
WBC # BLD AUTO: 2.2 10E3/UL (ref 4–11)

## 2022-08-10 PROCEDURE — 83735 ASSAY OF MAGNESIUM: CPT

## 2022-08-10 PROCEDURE — 85007 BL SMEAR W/DIFF WBC COUNT: CPT

## 2022-08-10 PROCEDURE — 80197 ASSAY OF TACROLIMUS: CPT

## 2022-08-10 PROCEDURE — 85027 COMPLETE CBC AUTOMATED: CPT

## 2022-08-10 PROCEDURE — 82248 BILIRUBIN DIRECT: CPT

## 2022-08-10 PROCEDURE — 36415 COLL VENOUS BLD VENIPUNCTURE: CPT

## 2022-08-10 PROCEDURE — 80053 COMPREHEN METABOLIC PANEL: CPT

## 2022-08-10 PROCEDURE — 84100 ASSAY OF PHOSPHORUS: CPT

## 2022-08-11 ENCOUNTER — TELEPHONE (OUTPATIENT)
Dept: TRANSPLANT | Facility: CLINIC | Age: 57
End: 2022-08-11

## 2022-08-11 DIAGNOSIS — Z94.4 LIVER TRANSPLANTED (H): ICD-10-CM

## 2022-08-11 DIAGNOSIS — B25.9 CMV COLITIS (H): Primary | ICD-10-CM

## 2022-08-11 DIAGNOSIS — A08.39 CMV COLITIS (H): Primary | ICD-10-CM

## 2022-08-11 RX ORDER — VALGANCICLOVIR 450 MG/1
1350 TABLET, FILM COATED ORAL DAILY
Qty: 30 TABLET | Refills: 0 | Status: SHIPPED | OUTPATIENT
Start: 2022-08-11 | End: 2022-08-15 | Stop reason: ALTCHOICE

## 2022-08-11 RX ORDER — TACROLIMUS 1 MG/1
1 CAPSULE ORAL EVERY 12 HOURS
Qty: 180 CAPSULE | Refills: 3 | Status: SHIPPED | OUTPATIENT
Start: 2022-08-11 | End: 2022-08-25

## 2022-08-11 NOTE — PROGRESS NOTES
The patient will remain on valcyte 1350 mg bid and letermovir 480 mg daily until she receives the maribavir, at that time she will stop valcyte and letermovir and start maribavir 400 mg bid.     Forest Dasilva MD

## 2022-08-11 NOTE — TELEPHONE ENCOUNTER
Contacted Berta, her tacro level 21. She reports accurate level. And she has had more frequent headaches the last week and shaking. Pt did start letermovir in the last week. Message to pharmacist to determine if interacts with tacro. Berta also reports thatshe may switch to maribavir. So will discuss both meds with pharamacist.    Pt to hold this morning dose of tacro. Pt usually takes 3 mg every 12 hours. Both letermovir and maribavir interfere with tacro and increase levels. Pt will decrease to 1 mg every 12 hours.   Pt will continue letermovir and valcyte until maribavir arrives on Tuesday.

## 2022-08-15 ENCOUNTER — TELEPHONE (OUTPATIENT)
Dept: TRANSPLANT | Facility: CLINIC | Age: 57
End: 2022-08-15

## 2022-08-15 DIAGNOSIS — Z94.4 LIVER TRANSPLANTED (H): Primary | ICD-10-CM

## 2022-08-15 DIAGNOSIS — B25.9 CMV (CYTOMEGALOVIRUS INFECTION) (H): ICD-10-CM

## 2022-08-15 NOTE — TELEPHONE ENCOUNTER
Call to Berta to check in, review needed labs.   Tac level was high last week, likely due to letermovir.  Maribavir will also elevate tac level.  She will recheck tac level on Wednesday and weekly until back to desired level.

## 2022-08-15 NOTE — TELEPHONE ENCOUNTER
CMV PCR back up to 467 international unit(s)/mL.  Per Dr. Dasilva:    From: Forest Dasilva MD   Sent: 8/11/2022   9:40 AM CDT   To: Lucy James RN     I had prescribed maribavir. She will remain on valcyte 1350 mg bid and letermovir 480 mg daily until she receives the maribavir, at that time she will stop valcyte and letermovir and start maribavir 400 mg bid.     Thanks,   EVON   -----   Once she is on maribavir, we still need the weekly CMV PCR but no need for the weekly  CBC with diff and the CMP (or BMP), we can do those every other week or monthly.     ThanksEVON

## 2022-08-16 ENCOUNTER — DOCUMENTATION ONLY (OUTPATIENT)
Dept: TRANSPLANT | Facility: CLINIC | Age: 57
End: 2022-08-16

## 2022-08-16 DIAGNOSIS — Z16.33 INFECTION DUE TO ANTIMICROBIAL RESISTANT VIRUS: ICD-10-CM

## 2022-08-16 DIAGNOSIS — B34.9 INFECTION DUE TO ANTIMICROBIAL RESISTANT VIRUS: ICD-10-CM

## 2022-08-16 DIAGNOSIS — B25.9 CYTOMEGALOVIRUS (CMV) VIREMIA (H): ICD-10-CM

## 2022-08-16 NOTE — PROGRESS NOTES
Message from Berta:  Berta Nava sent to Vanessa Durant, RN  Kamron Mendez   I started taking Livtencity this morning. Took 2 200 mg and will take 2 more tonight. I stopped both the Valcyte and Letermovir with the last dose taken yesterday (Monday). I did send a message to Dr. Dasilva.   Berta

## 2022-08-17 ENCOUNTER — TELEPHONE (OUTPATIENT)
Dept: TRANSPLANT | Facility: CLINIC | Age: 57
End: 2022-08-17

## 2022-08-17 ENCOUNTER — TELEPHONE (OUTPATIENT)
Dept: GASTROENTEROLOGY | Facility: CLINIC | Age: 57
End: 2022-08-17

## 2022-08-17 ENCOUNTER — LAB (OUTPATIENT)
Dept: LAB | Facility: CLINIC | Age: 57
End: 2022-08-17
Payer: COMMERCIAL

## 2022-08-17 DIAGNOSIS — Z94.4 LIVER TRANSPLANTED (H): ICD-10-CM

## 2022-08-17 DIAGNOSIS — Z94.4 S/P LIVER TRANSPLANT (H): ICD-10-CM

## 2022-08-17 DIAGNOSIS — B25.9 CMV (CYTOMEGALOVIRUS INFECTION) (H): ICD-10-CM

## 2022-08-17 LAB
CMV DNA IU/ML, INSTRUMENT: 244 IU/ML
CMV DNA SPEC NAA+PROBE-LOG#: 2.4 {LOG_COPIES}/ML
TACROLIMUS BLD-MCNC: 4.6 UG/L (ref 5–15)
TME LAST DOSE: ABNORMAL H
TME LAST DOSE: ABNORMAL H

## 2022-08-17 PROCEDURE — 36415 COLL VENOUS BLD VENIPUNCTURE: CPT

## 2022-08-17 PROCEDURE — 80197 ASSAY OF TACROLIMUS: CPT

## 2022-08-17 NOTE — TELEPHONE ENCOUNTER
Caller: Berta Nava      Procedure: COLON    Date, Location, and Surgeon of Procedure Canc[place] [facility] [facility]    Ordering Provider:MICKY MARINELLI    Reason for cancel (please be detailed, any staff messages or encounters to note?): PT INSTRUCTED TO RESCHEDULE @END OF OCTOBER        Rescheduled: Y     If rescheduled:    Date: 10/31   [place] [facility] [place]   Prep Resent: EXTENDED(changes to prep?)   Covid Test Rescheduled:    Note any change or update to original order/sedation:

## 2022-08-17 NOTE — TELEPHONE ENCOUNTER
Call to Berta to let her know that Dr. Crawford suggested moving colonoscopy to October as CMV PCR is still pos.  She will reschedule. She will also reschedule her appt w/ Dr. Crawford.

## 2022-08-22 NOTE — TELEPHONE ENCOUNTER
Pt saw Dr. Crawford on 7/14/2022.       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   Before you get blisters, you can have non-specific rash or even no rash.  It is common to have severe itch.  Treatment is geared towards relieving the symptoms.   Since you are doing well on topical steroids and antihistamines, we will stay there.  If things get worse, please follow up as soon as you can. And we will consider oral steroids or other immuno suppressive drugs.

## 2022-08-24 ENCOUNTER — LAB (OUTPATIENT)
Dept: LAB | Facility: CLINIC | Age: 57
End: 2022-08-24
Payer: COMMERCIAL

## 2022-08-24 DIAGNOSIS — Z94.4 S/P LIVER TRANSPLANT (H): ICD-10-CM

## 2022-08-24 DIAGNOSIS — B25.9 CMV (CYTOMEGALOVIRUS INFECTION) (H): ICD-10-CM

## 2022-08-24 LAB
TACROLIMUS BLD-MCNC: 4 UG/L (ref 5–15)
TME LAST DOSE: ABNORMAL H
TME LAST DOSE: ABNORMAL H

## 2022-08-24 PROCEDURE — 36415 COLL VENOUS BLD VENIPUNCTURE: CPT

## 2022-08-24 PROCEDURE — 80197 ASSAY OF TACROLIMUS: CPT

## 2022-08-25 ENCOUNTER — TELEPHONE (OUTPATIENT)
Dept: TRANSPLANT | Facility: CLINIC | Age: 57
End: 2022-08-25

## 2022-08-25 DIAGNOSIS — Z94.4 LIVER TRANSPLANTED (H): Primary | ICD-10-CM

## 2022-08-25 LAB
CMV DNA IU/ML, INSTRUMENT: 181 IU/ML
CMV DNA SPEC NAA+PROBE-LOG#: 2.3 {LOG_COPIES}/ML

## 2022-08-25 RX ORDER — TACROLIMUS 1 MG/1
2 CAPSULE ORAL EVERY 12 HOURS
Qty: 360 CAPSULE | Refills: 3 | COMMUNITY
Start: 2022-08-25 | End: 2022-09-08

## 2022-08-25 NOTE — TELEPHONE ENCOUNTER
ISSUE:   Tacrolimus IR level 4 on 8/25, goal 5-8, dose 1 mg BID.    PLAN:   Please call patient and confirm this was an accurate 12-hour trough. Verify Tacrolimus IR dose 4 mg BID. Confirm no new medications or illness. Confirm no missed doses. If accurate trough and accurate dose, increase Tacrolimus IR dose to 2 mg BID and repeat labs in 1 weeks    OUTCOME:   Spoke with patient, she confirms accurate trough level and current dose 1 mg BID. Patient confirmed dose change to 2 mg BID and to repeat labs in 1 weeks. (she states she already gets labs weekly to check her CMV levels)      Orders sent to preferred pharmacy for dose change and lab for repeat labs. Patient voiced understanding of plan.

## 2022-08-31 ENCOUNTER — LAB (OUTPATIENT)
Dept: LAB | Facility: CLINIC | Age: 57
End: 2022-08-31
Payer: COMMERCIAL

## 2022-08-31 DIAGNOSIS — Z94.4 S/P LIVER TRANSPLANT (H): ICD-10-CM

## 2022-08-31 DIAGNOSIS — B25.9 CMV (CYTOMEGALOVIRUS INFECTION) (H): ICD-10-CM

## 2022-08-31 LAB
CMV DNA SPEC NAA+PROBE-ACNC: <137 IU/ML
CMV DNA SPEC NAA+PROBE-LOG#: <2.1 {LOG_COPIES}/ML
TACROLIMUS BLD-MCNC: 7.5 UG/L (ref 5–15)
TME LAST DOSE: NORMAL H
TME LAST DOSE: NORMAL H

## 2022-08-31 PROCEDURE — 36415 COLL VENOUS BLD VENIPUNCTURE: CPT

## 2022-08-31 PROCEDURE — 80197 ASSAY OF TACROLIMUS: CPT

## 2022-09-01 ENCOUNTER — DOCUMENTATION ONLY (OUTPATIENT)
Dept: TRANSPLANT | Facility: CLINIC | Age: 57
End: 2022-09-01

## 2022-09-01 ENCOUNTER — DOCUMENTATION ONLY (OUTPATIENT)
Dept: INFECTIOUS DISEASES | Facility: CLINIC | Age: 57
End: 2022-09-01

## 2022-09-01 ENCOUNTER — TELEPHONE (OUTPATIENT)
Dept: TRANSPLANT | Facility: CLINIC | Age: 57
End: 2022-09-01

## 2022-09-01 NOTE — PROGRESS NOTES
VGCV started on 6/15/2022 with adequate but slow response so the dose was increased to 1350 mg bid on 7/8/2022 with stable viremia. Resistance testing showed high grade VGCV mutaion C603W.   Maribavir was started on 8/16/2022 and VGCV was discontinued (I had added letermovir for few days until maribavir is available).     CMV is <2.1 log 2 weeks after starting maribavir.     If next week's CMV viral load is <2.1 log or undetected we can stop maribavir and start letermovir 480 mg daily. The patient already have enough letermovir and refill.     Forest Dasilva MD

## 2022-09-01 NOTE — TELEPHONE ENCOUNTER
Per Dr. Leventhal:    Leventhal, Thomas Michael, MD sent to Vanessa Durant RN  Caller: Unspecified (Today, 10:34 AM)  Lets give it one more week.  If symptoms present, have her get the following labs       - BMP     - Random AM cortisol       TL       Called Berta, told her this. We will touch base again in a week.

## 2022-09-01 NOTE — TELEPHONE ENCOUNTER
Call from Berta who is concerned that she may be having withdrawal symptoms from stopping prednisone.  Has generalized achiness (mostly in her legs) since stopping prednisone.  Not debilitating but uncomfortable.  Stopped  pred 8/26(tapered off).    Also started some new meds (started meribavir 8/16) for her ganciclovir resistant CMV but she is thinking it may be more related to pred.    Message to Dr. Leventhal

## 2022-09-01 NOTE — PROGRESS NOTES
CMV PCR neg.  Message from Dr. Dasilva:    Forest Dasilva MD sent to Vanessa Durant RN  Finally,   We should continue maribavir 400 mg bid for now.   I will order letermovir 480 mg  daily to be started only if next week's CMV PCR is undetected or <137.     I will call the patient to let her know.

## 2022-09-06 ENCOUNTER — TELEPHONE (OUTPATIENT)
Dept: TRANSPLANT | Facility: CLINIC | Age: 57
End: 2022-09-06

## 2022-09-06 NOTE — TELEPHONE ENCOUNTER
Call to Berta.  She will have another CMV PCR on 9/7.  If CMV PCR is undetected she will switch back to letermovir.  Letermovir made her tac level increase so we will need to watch tac level.  I asked her to check a tac level on Monday, 9/12.

## 2022-09-07 ENCOUNTER — LAB (OUTPATIENT)
Dept: LAB | Facility: CLINIC | Age: 57
End: 2022-09-07
Payer: COMMERCIAL

## 2022-09-07 DIAGNOSIS — B25.9 CMV (CYTOMEGALOVIRUS INFECTION) (H): ICD-10-CM

## 2022-09-07 DIAGNOSIS — Z94.4 LIVER TRANSPLANTED (H): ICD-10-CM

## 2022-09-07 DIAGNOSIS — Z94.4 S/P LIVER TRANSPLANT (H): ICD-10-CM

## 2022-09-07 LAB
ALBUMIN SERPL-MCNC: 3.6 G/DL (ref 3.4–5)
ALP SERPL-CCNC: 50 U/L (ref 40–150)
ALT SERPL W P-5'-P-CCNC: 32 U/L (ref 0–50)
ANION GAP SERPL CALCULATED.3IONS-SCNC: 6 MMOL/L (ref 3–14)
AST SERPL W P-5'-P-CCNC: 25 U/L (ref 0–45)
BASOPHILS # BLD AUTO: 0.1 10E3/UL (ref 0–0.2)
BASOPHILS NFR BLD AUTO: 2 %
BILIRUB DIRECT SERPL-MCNC: 0.1 MG/DL (ref 0–0.2)
BILIRUB SERPL-MCNC: 0.6 MG/DL (ref 0.2–1.3)
BUN SERPL-MCNC: 21 MG/DL (ref 7–30)
CALCIUM SERPL-MCNC: 9 MG/DL (ref 8.5–10.1)
CHLORIDE BLD-SCNC: 112 MMOL/L (ref 94–109)
CO2 SERPL-SCNC: 22 MMOL/L (ref 20–32)
CREAT SERPL-MCNC: 0.83 MG/DL (ref 0.52–1.04)
EOSINOPHIL # BLD AUTO: 0.2 10E3/UL (ref 0–0.7)
EOSINOPHIL NFR BLD AUTO: 3 %
ERYTHROCYTE [DISTWIDTH] IN BLOOD BY AUTOMATED COUNT: 15.7 % (ref 10–15)
GFR SERPL CREATININE-BSD FRML MDRD: 81 ML/MIN/1.73M2
GLUCOSE BLD-MCNC: 93 MG/DL (ref 70–99)
HCT VFR BLD AUTO: 33.6 % (ref 35–47)
HGB BLD-MCNC: 11.7 G/DL (ref 11.7–15.7)
IMM GRANULOCYTES # BLD: 0.2 10E3/UL
IMM GRANULOCYTES NFR BLD: 4 %
LYMPHOCYTES # BLD AUTO: 1.5 10E3/UL (ref 0.8–5.3)
LYMPHOCYTES NFR BLD AUTO: 34 %
MCH RBC QN AUTO: 32 PG (ref 26.5–33)
MCHC RBC AUTO-ENTMCNC: 34.8 G/DL (ref 31.5–36.5)
MCV RBC AUTO: 92 FL (ref 78–100)
MONOCYTES # BLD AUTO: 0.5 10E3/UL (ref 0–1.3)
MONOCYTES NFR BLD AUTO: 11 %
NEUTROPHILS # BLD AUTO: 2.1 10E3/UL (ref 1.6–8.3)
NEUTROPHILS NFR BLD AUTO: 46 %
PHOSPHATE SERPL-MCNC: 4 MG/DL (ref 2.5–4.5)
PLATELET # BLD AUTO: 156 10E3/UL (ref 150–450)
POTASSIUM BLD-SCNC: 3.8 MMOL/L (ref 3.4–5.3)
PROT SERPL-MCNC: 7.1 G/DL (ref 6.8–8.8)
RBC # BLD AUTO: 3.66 10E6/UL (ref 3.8–5.2)
SODIUM SERPL-SCNC: 140 MMOL/L (ref 133–144)
TACROLIMUS BLD-MCNC: 7.7 UG/L (ref 5–15)
TME LAST DOSE: NORMAL H
TME LAST DOSE: NORMAL H
WBC # BLD AUTO: 4.5 10E3/UL (ref 4–11)

## 2022-09-07 PROCEDURE — 36415 COLL VENOUS BLD VENIPUNCTURE: CPT

## 2022-09-07 PROCEDURE — 84100 ASSAY OF PHOSPHORUS: CPT

## 2022-09-07 PROCEDURE — 82248 BILIRUBIN DIRECT: CPT

## 2022-09-07 PROCEDURE — 80053 COMPREHEN METABOLIC PANEL: CPT

## 2022-09-07 PROCEDURE — 80197 ASSAY OF TACROLIMUS: CPT

## 2022-09-07 PROCEDURE — 85025 COMPLETE CBC W/AUTO DIFF WBC: CPT

## 2022-09-08 ENCOUNTER — TELEPHONE (OUTPATIENT)
Dept: TRANSPLANT | Facility: CLINIC | Age: 57
End: 2022-09-08

## 2022-09-08 DIAGNOSIS — Z94.4 LIVER TRANSPLANTED (H): ICD-10-CM

## 2022-09-08 LAB
CMV DNA SPEC NAA+PROBE-ACNC: <137 IU/ML
CMV DNA SPEC NAA+PROBE-LOG#: <2.1 {LOG_COPIES}/ML

## 2022-09-08 RX ORDER — TACROLIMUS 0.5 MG/1
0.5 CAPSULE ORAL EVERY 12 HOURS
Qty: 180 CAPSULE | Refills: 3 | Status: SHIPPED | OUTPATIENT
Start: 2022-09-08 | End: 2022-09-15 | Stop reason: DRUGHIGH

## 2022-09-08 RX ORDER — TACROLIMUS 1 MG/1
1 CAPSULE ORAL EVERY 12 HOURS
Qty: 180 CAPSULE | Refills: 3 | Status: SHIPPED | OUTPATIENT
Start: 2022-09-08 | End: 2022-09-15

## 2022-09-08 NOTE — TELEPHONE ENCOUNTER
ISSUE:   Tacrolimus IR level 7.7 on 9/7, goal 5-7, dose 2 mg BID.    PLAN:   Please call patient and confirm this was an accurate 12-hour trough. Verify Tacrolimus IR dose.Confirm no new medications or illness. Confirm no missed doses. If accurate trough and accurate dose, decrease Tacrolimus IR dose to 1.5 mg BID and repeat labs next week. Vanessa Durant RN    OUTCOME:   Spoke with patient, they confirm accurate trough level and current dose 2 mg BID. Patient confirmed dose change to 1.5 mg BID and to repeat labs in 1 weeks. Orders sent to preferred pharmacy for dose change and lab for repeat labs. Patient voiced understanding of plan.    Anne Marie Miranda LPN

## 2022-09-13 DIAGNOSIS — Z94.4 LIVER TRANSPLANTED (H): Primary | ICD-10-CM

## 2022-09-13 NOTE — PROGRESS NOTES
Per Dr Leventhal patient to have cortisol AM level After lab is checked, can start her on prednisone 2mg daily    Cortisol level entered.

## 2022-09-14 ENCOUNTER — LAB (OUTPATIENT)
Dept: LAB | Facility: CLINIC | Age: 57
End: 2022-09-14
Payer: COMMERCIAL

## 2022-09-14 ENCOUNTER — TELEPHONE (OUTPATIENT)
Dept: TRANSPLANT | Facility: CLINIC | Age: 57
End: 2022-09-14

## 2022-09-14 DIAGNOSIS — B25.9 CMV (CYTOMEGALOVIRUS INFECTION) (H): ICD-10-CM

## 2022-09-14 DIAGNOSIS — Z94.4 LIVER TRANSPLANTED (H): ICD-10-CM

## 2022-09-14 DIAGNOSIS — Z94.4 S/P LIVER TRANSPLANT (H): ICD-10-CM

## 2022-09-14 LAB
CMV DNA IU/ML, INSTRUMENT: 1177 IU/ML
CMV DNA SPEC NAA+PROBE-LOG#: 3.1 {LOG_COPIES}/ML
CORTIS SERPL-MCNC: 12.6 UG/DL
TACROLIMUS BLD-MCNC: 9.8 UG/L (ref 5–15)
TME LAST DOSE: NORMAL H
TME LAST DOSE: NORMAL H

## 2022-09-14 PROCEDURE — 80197 ASSAY OF TACROLIMUS: CPT

## 2022-09-14 PROCEDURE — 36415 COLL VENOUS BLD VENIPUNCTURE: CPT

## 2022-09-14 PROCEDURE — 82533 TOTAL CORTISOL: CPT

## 2022-09-15 ENCOUNTER — TELEPHONE (OUTPATIENT)
Dept: TRANSPLANT | Facility: CLINIC | Age: 57
End: 2022-09-15

## 2022-09-15 DIAGNOSIS — B34.9 INFECTION DUE TO ANTIMICROBIAL RESISTANT VIRUS: ICD-10-CM

## 2022-09-15 DIAGNOSIS — Z94.4 LIVER TRANSPLANTED (H): ICD-10-CM

## 2022-09-15 DIAGNOSIS — Z16.33 INFECTION DUE TO ANTIMICROBIAL RESISTANT VIRUS: ICD-10-CM

## 2022-09-15 DIAGNOSIS — B25.9 CYTOMEGALOVIRUS (CMV) VIREMIA (H): ICD-10-CM

## 2022-09-15 RX ORDER — TACROLIMUS 1 MG/1
1 CAPSULE ORAL EVERY 12 HOURS
Qty: 180 CAPSULE | Refills: 3 | Status: SHIPPED | OUTPATIENT
Start: 2022-09-15 | End: 2022-09-29

## 2022-09-15 NOTE — TELEPHONE ENCOUNTER
Call to Berta to let her know that cortisol level was within normal range, I reviewed this w/ Dr. Leventhal and we decided we would try to hold off on prednisone.  She was still feeling very achy last week, now better this week.  She is concerned that her CMV DNA international unit(s)/ml jumped back up to 1177 this last Monday after being negative for 2 weeks.  She took last dose maribavir on 9/7 and started letermovir on 9/8.  She will get another CMV test on 9/21.  Message to Dr. Dasilva.

## 2022-09-15 NOTE — TELEPHONE ENCOUNTER
Call to Berta in regards to ID med changes below.  She will stop letermovir and restart maribavir tomorrow.           Hi Dr. Fernandes,  Thanks for your very quick response!  I can certainly communicate this to Berta if you can reorder the Maribavir?  I don't want the transplant office to be called for refills on these antivirals that we don't usually use, hope that's OK.  Berta is planning on getting CMV next week.  I will ask her to go back on her previous dose of Maribavir.  Thank you!!  ===View-only below this line===  ----- Message -----  From: Gala Fernandes,   Sent: 9/15/2022   2:58 PM CDT  To: Vanessa Durant, RN, MD Jevon Hodge,     I'm currently covering for Dr. Dasilva while he is on vacation. He will be back Monday.     Just to clarify -   Maribavir stopped 9/7, CMV <137  Letermovir started on 9/8 9/14 --> CMV 1,177    So almost a week after ending Maribavir and starting Letermovir her CMV Qt increased.     In this case - I would be concerned for a possible CMV break through while on Letermovir.     If you can - we should advise her to go back on Maribavir and repeat CMV again next week as you have planned.     Is that possible?    Thank you,   Gala

## 2022-09-15 NOTE — TELEPHONE ENCOUNTER
Tac level on 9/12 was 9.8.  She was decreased to 1.5 mg po bid.  I asked her to decrease to 1 mg po bid.  She will have tac level redrawn next week.

## 2022-09-21 ENCOUNTER — LAB (OUTPATIENT)
Dept: LAB | Facility: CLINIC | Age: 57
End: 2022-09-21
Payer: COMMERCIAL

## 2022-09-21 DIAGNOSIS — B25.9 CMV (CYTOMEGALOVIRUS INFECTION) (H): ICD-10-CM

## 2022-09-21 DIAGNOSIS — Z94.4 S/P LIVER TRANSPLANT (H): ICD-10-CM

## 2022-09-21 LAB
CMV DNA SPEC NAA+PROBE-ACNC: <137 IU/ML
CMV DNA SPEC NAA+PROBE-LOG#: <2.1 {LOG_COPIES}/ML
TACROLIMUS BLD-MCNC: 5.3 UG/L (ref 5–15)
TME LAST DOSE: NORMAL H
TME LAST DOSE: NORMAL H

## 2022-09-21 PROCEDURE — 36415 COLL VENOUS BLD VENIPUNCTURE: CPT

## 2022-09-21 PROCEDURE — 80197 ASSAY OF TACROLIMUS: CPT

## 2022-09-28 ENCOUNTER — LAB (OUTPATIENT)
Dept: LAB | Facility: CLINIC | Age: 57
End: 2022-09-28
Payer: COMMERCIAL

## 2022-09-28 DIAGNOSIS — Z94.4 S/P LIVER TRANSPLANT (H): ICD-10-CM

## 2022-09-28 DIAGNOSIS — B25.9 CMV (CYTOMEGALOVIRUS INFECTION) (H): ICD-10-CM

## 2022-09-28 LAB
CMV DNA SPEC NAA+PROBE-ACNC: <137 IU/ML
CMV DNA SPEC NAA+PROBE-LOG#: <2.1 {LOG_COPIES}/ML
TACROLIMUS BLD-MCNC: 3.6 UG/L (ref 5–15)
TME LAST DOSE: ABNORMAL H
TME LAST DOSE: ABNORMAL H

## 2022-09-28 PROCEDURE — 36415 COLL VENOUS BLD VENIPUNCTURE: CPT

## 2022-09-28 PROCEDURE — 80197 ASSAY OF TACROLIMUS: CPT

## 2022-09-29 DIAGNOSIS — Z94.4 LIVER TRANSPLANTED (H): ICD-10-CM

## 2022-09-29 RX ORDER — TACROLIMUS 1 MG/1
2 CAPSULE ORAL EVERY 12 HOURS
Qty: 360 CAPSULE | Refills: 3 | Status: SHIPPED | OUTPATIENT
Start: 2022-09-29 | End: 2023-01-18

## 2022-09-29 NOTE — TELEPHONE ENCOUNTER
ISSUE:   Tacrolimus IR level 3.6 on 9/28, goal 5-7, dose 1 mg BID.    PLAN:   Please call patient and confirm this was an accurate 12-hour trough. Verify Tacrolimus IR dose. Confirm no new medications or illness. Confirm no missed doses. If accurate trough and accurate dose, increase Tacrolimus IR dose to 2 mg BID and repeat labs in 1 month.  Vanessa Durant RN    OUTCOME:   Spoke with patient, they confirm accurate trough level and current dose 1 mg BID. Patient confirmed dose change to 2 mg BID and to repeat labs in 1 months. Orders sent to preferred pharmacy for dose change and lab for repeat labs. Patient voiced understanding of plan.     Anne Marie Miranda LPN

## 2022-09-30 DIAGNOSIS — B34.9 INFECTION DUE TO ANTIMICROBIAL RESISTANT VIRUS: ICD-10-CM

## 2022-09-30 DIAGNOSIS — B25.9 CYTOMEGALOVIRUS (CMV) VIREMIA (H): Primary | ICD-10-CM

## 2022-09-30 DIAGNOSIS — Z16.33 INFECTION DUE TO ANTIMICROBIAL RESISTANT VIRUS: ICD-10-CM

## 2022-09-30 NOTE — PROGRESS NOTES
CMV has been <2.1 log on two occasions.   Will continue maribavir and check CMV-specific T-cell to assess whether the patient is ready to stop maribavir.    I am afraid, letermovir is not an option for suppressive therapy given the breakthrough viremia while on letermovir. VGCV is not a great option given high grade resistance. Of course we can always try high dose VGCV in combination with letermovir if we absolutely have to.     Forest Dasilva MD

## 2022-10-05 ENCOUNTER — LAB (OUTPATIENT)
Dept: LAB | Facility: CLINIC | Age: 57
End: 2022-10-05
Payer: COMMERCIAL

## 2022-10-05 DIAGNOSIS — Z16.33 INFECTION DUE TO ANTIMICROBIAL RESISTANT VIRUS: ICD-10-CM

## 2022-10-05 DIAGNOSIS — B34.9 INFECTION DUE TO ANTIMICROBIAL RESISTANT VIRUS: ICD-10-CM

## 2022-10-05 DIAGNOSIS — B25.9 CYTOMEGALOVIRUS (CMV) VIREMIA (H): ICD-10-CM

## 2022-10-05 DIAGNOSIS — Z94.4 S/P LIVER TRANSPLANT (H): ICD-10-CM

## 2022-10-05 DIAGNOSIS — B25.9 CMV (CYTOMEGALOVIRUS INFECTION) (H): ICD-10-CM

## 2022-10-05 LAB
ALBUMIN SERPL-MCNC: 3.7 G/DL (ref 3.4–5)
ALP SERPL-CCNC: 66 U/L (ref 40–150)
ALT SERPL W P-5'-P-CCNC: 17 U/L (ref 0–50)
ANION GAP SERPL CALCULATED.3IONS-SCNC: 8 MMOL/L (ref 3–14)
AST SERPL W P-5'-P-CCNC: 18 U/L (ref 0–45)
BILIRUB DIRECT SERPL-MCNC: 0.1 MG/DL (ref 0–0.2)
BILIRUB SERPL-MCNC: 0.6 MG/DL (ref 0.2–1.3)
BUN SERPL-MCNC: 22 MG/DL (ref 7–30)
CALCIUM SERPL-MCNC: 9.4 MG/DL (ref 8.5–10.1)
CHLORIDE BLD-SCNC: 113 MMOL/L (ref 94–109)
CMV DNA SPEC NAA+PROBE-ACNC: <137 IU/ML
CMV DNA SPEC NAA+PROBE-LOG#: <2.1 {LOG_COPIES}/ML
CO2 SERPL-SCNC: 21 MMOL/L (ref 20–32)
CREAT SERPL-MCNC: 0.81 MG/DL (ref 0.52–1.04)
GFR SERPL CREATININE-BSD FRML MDRD: 83 ML/MIN/1.73M2
GLUCOSE BLD-MCNC: 100 MG/DL (ref 70–99)
PHOSPHATE SERPL-MCNC: 4.4 MG/DL (ref 2.5–4.5)
POTASSIUM BLD-SCNC: 4.3 MMOL/L (ref 3.4–5.3)
PROT SERPL-MCNC: 7.7 G/DL (ref 6.8–8.8)
SODIUM SERPL-SCNC: 142 MMOL/L (ref 133–144)
TACROLIMUS BLD-MCNC: 5.9 UG/L (ref 5–15)
TME LAST DOSE: NORMAL H
TME LAST DOSE: NORMAL H

## 2022-10-05 PROCEDURE — 36415 COLL VENOUS BLD VENIPUNCTURE: CPT

## 2022-10-05 PROCEDURE — 84100 ASSAY OF PHOSPHORUS: CPT

## 2022-10-05 PROCEDURE — 99000 SPECIMEN HANDLING OFFICE-LAB: CPT

## 2022-10-05 PROCEDURE — 80197 ASSAY OF TACROLIMUS: CPT

## 2022-10-05 PROCEDURE — 82248 BILIRUBIN DIRECT: CPT

## 2022-10-05 PROCEDURE — 80053 COMPREHEN METABOLIC PANEL: CPT

## 2022-10-05 PROCEDURE — 86352 CELL FUNCTION ASSAY W/STIM: CPT | Mod: 90

## 2022-10-10 ENCOUNTER — HEALTH MAINTENANCE LETTER (OUTPATIENT)
Age: 57
End: 2022-10-10

## 2022-10-10 LAB — SCANNED LAB RESULT: ABNORMAL

## 2022-10-11 ENCOUNTER — VIRTUAL VISIT (OUTPATIENT)
Dept: INFECTIOUS DISEASES | Facility: CLINIC | Age: 57
End: 2022-10-11
Attending: INTERNAL MEDICINE
Payer: COMMERCIAL

## 2022-10-11 DIAGNOSIS — B34.9 INFECTION DUE TO ANTIMICROBIAL RESISTANT VIRUS: ICD-10-CM

## 2022-10-11 DIAGNOSIS — Z94.4 LIVER REPLACED BY TRANSPLANT (H): ICD-10-CM

## 2022-10-11 DIAGNOSIS — B25.9 CMV COLITIS (H): ICD-10-CM

## 2022-10-11 DIAGNOSIS — A08.39 CMV COLITIS (H): ICD-10-CM

## 2022-10-11 DIAGNOSIS — Z16.33 INFECTION DUE TO ANTIMICROBIAL RESISTANT VIRUS: ICD-10-CM

## 2022-10-11 DIAGNOSIS — Z23 NEED FOR VACCINATION: ICD-10-CM

## 2022-10-11 DIAGNOSIS — B25.9 CYTOMEGALOVIRUS (CMV) VIREMIA (H): Primary | ICD-10-CM

## 2022-10-11 PROCEDURE — 99215 OFFICE O/P EST HI 40 MIN: CPT | Mod: GT | Performed by: INTERNAL MEDICINE

## 2022-10-11 NOTE — LETTER
10/11/2022       RE: Berta Nava  [address] [facility] [id number]     Dear Colleague,    Thank you for referring your patient, Berta Nava, to the [facility] INFECTI[facility] DISE[facility] CLINIC [place] at [facility]. Please see a copy of my visit note below.    Berta Nava  is being evaluated via a billable video visit.      Patient denies any changes since echeck-in regarding medication and allergies and states all information entered during echeck-in remains accurate.    How would you like to obtain your AVS? MyChart  For the video visit, send the invitation by: Text to cell phone: [phone]  Will anyone else be joining your video visit? No      Start time:4:29 PM   End time: 4:47 PM  AmWell  From home.       [facility]    Transplant Infectious Diseases Outpatient Consultation     Patient:  Berta Nava, Date of birth 1965, Medical record number [id number]  Date of Visit:  10/11/2022  Consult requested by Dr. Leventhal for evaluation of CMV colitis.           Recommendations:   1. Continue maribavir for the long foreseen future.   2. Will repeat CMV-specific T-cell immunity in 2-3 months.   3. Will discuss with the transplant team if there are plans to further decrease IS given she is approaching the one year landmark.   4. Will check IgG and replete if needed.   5. Continue weekly CMV PCR.   6. OK to receive the bivalent COVID-19 booster and influenza vaccine.   7. Will need to re-initiate the shingrix vaccine and hepatitis A/B vaccine.   8. Will need the penumovax 23-valent vaccine.    9. OK to proceed with planned colonoscopy.     RTC: 2 months, virtual or in-person.         Summary of Presentation:   Transplants:  11/18/2021 (Liver), Postoperative day:  327     This patient is a 56 year old female with PBC-liver cirrhosis s/p OLT in 11/2021. Was on TAC/prednisone then MMF was re-introduced due to mild  rejection.   MMF was again discontinued after CMV infection.   Currently on TAC/prednisone.   History of UC with screening colonoscopy showing an evidence of CMV colitis. CMV PCR checked and was at 4.8 log.         Active Problems and Infectious Diseases Issues:   1. Primary, donor-derived CMV viremia and colitis.   This followed an escalation of IS due to mild rejection. MMF was discontinued.   VGCV induction therapy was started on 6/15/2022 and increased to 1350 mg bid due to slow response (1.3 log decline over 3 weeks). The CMV continued to decline but did not go below 2.1 log. 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    Maribavir was started on 8/16/2022 with adequate response with declining VL to <2.1. However, viremia recurred only one week after stopping maribavir and initiating letermovir suppressive therapy.   Maribavir was resumed around 9/15/2022, again with adequate response.     CMV-specific T-cell showed poor CMV-specific count (0.07). will continue maribavir, discuss IS therapy with the transplant team and repeat CMV-specific immunity test in 2-3 months. Will also check IgG to check if we can boost her with IVIG.         Old Problems and Infectious Diseases Issues:   1. SBP and BSI with S mitis 4/29/2021; on IV ABx since 4/29/2021 for possible association with liver abscess.    2. Indeterminate QuantiFeron due to low reaction to mitogen likely due to the patient's underlying liver disease. This patient is low risk for TB, CXR unremarkable; no further evaluations were deemed necessary.     Other Infectious Disease issues include:  - QTc: 465 as of 11/2021.   - PCP prophylaxis: bactrim until 4/24/2022 (5-6 months).   - Serostatus: CMV D+/R-, EBV D+/R+, HSV1?/2?, VZV ?. Currently on maribavir.    - Immunization status: This patient received the third dose of the COVID-19 vaccine on 3/21/2022, she also received evusheld on 5/31/2022. Otherwise she needs to repeat the shingrix  vaccine series since she only received one dose on 6/21/2021. Will need conjugated pneumonia vaccine, the COVID-19 booster and the seasonal influenza.    - Gamma globulin status: ?      Attestation:  Total duration of visit including chart review, reviewing labs and imaging, interviewing and examining the patient, documentation, and sending communication to the patient and to the primary treating team, all at the same day of this encounter, is: 60 minutes.   Forest Dasilva MD    Contact information available via [facility] Paging/Directory     10/11/2022           Interim History:   No new complaints. No fever, no diarrhea, no N/V. She dose have dysgeusia with maribavir.         History of the Infectious Disease lllness:       Transplants:  11/18/2021 (Liver); Postoperative day:  327    55 yo female with PBC-liver cirrhosis s/p OLT in 11/2021. Also history of UC for which she undergoes screening colonoscopy.   During the last screening colonoscopy on 6/8/2022, she was found to have a single, 2mm cecal ulcer. The biopsy was c/w CMV infection.   MMF was discontinued.   The patient had no fever, diarrhea, N/V or abdominal pain. She was started on PO VGCV, later CMV VL was 4.8 log. Due to lack of symptoms, PO VGCV was continued. Unfortunately, after initial response, VL persisted at low level and did not respond to high dose VGCV. 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 CMV VL recurred while on suppressive therapy with letermovir. Maribavir was resumed with adequate response. CMV-specific immunity was not adequate enough to stop maribavir.   The patient was asymptomatic all along.     The patient had mild ACR on a liver biopsy 5/2022, MMF was readminister and TAC/prednisone were maintained.     Exposure History:  Lives with her  in a single family house in the [facility] area.   Does some gardening but wear gloves.   Traveled to [place] [place] [facility] [place] in the  past.   The only international travel was to [place], to a resort only.   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   Has two grown up children, do not live with her.   Has a dog.          Review of Systems:   As mentioned in the interim history otherwise negative by reviewing constitutional symptoms, central and peripheral neurological systems, respiratory system, cardiac system, GI system,  system, musculoskeletal, skin, allergy, and lymphatics.           Immunizations:     Immunization History   Administered Date(s) Administered     COVID-19,PF,Moderna 02/25/2021, 03/18/2021, 04/15/2021, 03/21/2022, 09/13/2022     Flu, Unspecified 10/18/2016, 11/01/2017     HepA-Adult 05/12/2021     HepB-Adult 05/11/2021     Influenza (IIV3) PF 11/10/2010     Influenza Vaccine IM > 6 months Valent IIV4 (Alfuria,Fluzone) 09/03/2015, 12/30/2019, 09/15/2020     Influenza Vaccine, 6+MO IM (QUADRIVALENT W/PRESERVATIVES) 10/17/2018     Pneumo Conj 13-V (2010&after) 06/12/2021     Td (Adult), Adsorbed 03/06/1995, 05/27/2005     Tdap (Adacel,Boostrix) 07/01/2014     Twinrix A/B 06/12/2021     Zoster vaccine recombinant adjuvanted (SHINGRIX) 06/21/2021             Allergies:     Allergies   Allergen Reactions     Diagnostic X-Ray Materials Hives     PATIENT HAD HIVE REACTION AFTER ADMINISTERING CT CONTRAST DYE.       Contrast Dye              Medications:     Current Outpatient Medications   Medication Sig     amLODIPine (NORVASC) 5 MG tablet TAKE 2 TABLETS(10 MG) BY MOUTH DAILY     aspirin (ASA) 81 MG EC tablet Take 1 tablet (81 mg) by mouth daily     maribavir (LIVTENCITY) 200 MG tablet Take 2 tablets (400 mg) by mouth 2 times daily     multivitamin (CENTRUM SILVER) tablet Take 1 tablet by mouth daily     predniSONE (DELTASONE) 5 MG tablet Take 2 tablets (10 mg) by mouth daily (Patient taking differently: Take 10 mg by mouth See Admin Instructions Take 7.5 mg daily x 1 week, then 5mg daily x 1 week then 2.5mg daily x 1 week then stop.)     tacrolimus  (GENERIC EQUIVALENT) 1 MG capsule Take 2 capsules (2 mg) by mouth every 12 hours     ursodiol (ACTIGALL) 300 MG capsule Take 1 capsule (300 mg) by mouth 2 times daily     No current facility-administered medications for this visit.            Physical Exam:   No vitals are available during this virtual visit.   Constitutional: awake, alert, cooperative, no apparent distress and appears at stated age, well nourished.   Head, ENT, Eyes, and Neck: Normocephalic. Not pale.          Laboratory Data:     No results found for: ACD4    Inflammatory Markers    Recent Labs   Lab Test 11/17/21  1908 08/19/21  0018 06/08/21  1602 06/01/21  0915 05/25/21  1455 05/21/21  1500 05/18/21  1010   CRP 54.0* 40.0* 23.1* 16.1* 14.3* 16.1* 21.4*       Immune Globulin Studies    No lab results found.    Metabolic Studies    Recent Labs   Lab Test 10/05/22  0749 09/07/22  0745 08/10/22  0740 08/01/22  1653 07/27/22  0756 07/20/22  0805 12/06/21  1031 12/03/21  1145 11/23/21  0551 11/23/21  0538 11/19/21  0127 11/19/21  0126    140 142 142 139 140   < > 142   < >  --    < >  --    POTASSIUM 4.3 3.8 3.5 3.8 4.1 4.3   < > 4.4   < >  --    < >  --    CHLORIDE 113* 112* 111* 111* 111* 110*   < > 113*   < >  --    < >  --    CO2 21 22 23 22 21 25   < > 23   < >  --    < >  --    ANIONGAP 8 6 8 9 7 5   < > 6   < >  --    < >  --    BUN 22 21 24 31* 23 32*   < > 24   < >  --    < >  --    CR 0.81 0.83 0.85 0.81 0.68 0.75   < > 0.65   < >  --    < >  --    GFRESTIMATED 83 81 79 84 >90 >90   < > >90   < >  --    < >  --    * 93 91 93 84 94   < > 97   < >  --    < >  --    A1C  --   --   --   --   --   --   --   --   --   --   --  5.4   MARIELENA 9.4 9.0 8.8 9.1 8.7 9.1   < > 8.4*   < >  --    < >  --    PHOS 4.4 4.0 3.3  --  3.3  --    < > 3.5   < > 1.9*   < >  --    MAG  --   --  1.8  --  2.0  --    < > 1.7   < > 2.2   < >  --    LACT  --   --   --   --   --   --   --  0.6*  --  0.8   < >  --     < > = values in this interval not displayed.        Hepatic Studies    Recent Labs   Lab Test 10/05/22  0749 09/07/22  0745 08/10/22  0740 08/01/22  1653 07/27/22  0756 07/20/22  0805   BILITOTAL 0.6 0.6 0.6 0.4 0.5 0.6   ALKPHOS 66 50 49 52 55 57   PROTTOTAL 7.7 7.1 6.9 7.4 7.2 7.4   ALBUMIN 3.7 3.6 3.6 3.9 3.8 3.7   AST 18 25 21 18 17 16   ALT 17 32 30 28 28 32       Hematology Studies     Recent Labs   Lab Test 09/07/22  0745 08/10/22  0740 08/01/22  1653 07/27/22  0756 07/20/22  0805 07/13/22  0758 07/06/22  0801 06/21/21  0938 06/12/21  1259   WBC 4.5 2.2* 2.3* 2.0* 2.1* 2.3* 2.2*   < > 21.3*   ANEU  --  0.9* 1.4* 1.1*  --  1.4* 1.2*  --  18.5*   ALYM  --  1.2 0.9 0.9  --  0.9 0.9  --  1.1   BRIGIDO  --  0.1 0.0 0.0  --  0.0 0.1  --  1.5*   AEOS  --  0.0 0.0 0.0  --  0.0 0.0  --  0.0   HGB 11.7 12.7 12.0 12.4 12.6 12.9 12.1   < > 10.3*   HCT 33.6* 37.9 36.2 36.8 37.2 38.9 37.9   < > 32.3*    171 156 175 221 218 204   < > 291    < > = values in this interval not displayed.       Clotting Studies    Recent Labs   Lab Test 05/17/22  1011 12/13/21  1022 12/03/21  1145 11/22/21  0419 11/21/21  0318 11/20/21  0508   INR 1.09 1.12 1.13 1.22* 1.34*  --    PTT  --   --  36 29 31 33       Urine Studies    Recent Labs   Lab Test 07/27/22  0803 12/03/21  1342 11/24/21 2014 11/20/21  1030 11/06/21  0509 10/25/21  1339 09/01/21  1757   URINEPH 7.0 6.0 5.0 5.0 5.5 5.5 5.5   NITRITE Negative Negative Negative Negative Negative Negative Negative   LEUKEST Negative Negative Negative Negative Negative Negative Negative   WBCU  --  1  --  3 1 1 2         Microbiology:  Last 6 Culture results with specimen source  Culture Micro   Date Value Ref Range Status   06/10/2021 No growth  Final   06/10/2021 Canceled, Test credited  Final   06/10/2021 Duplicate request  Final   06/10/2021   Final    Notification of test cancellation was given to  Christine Torres RN from 7C. 6/10/21 at 0812.TV.     06/10/2021 No growth  Final   06/09/2021 No growth  Final    Specimen Description    Date Value Ref Range Status   06/10/2021 Ascites Fluid  Final   06/10/2021 Ascites Fluid  Final   06/10/2021 Midstream Urine  Final   06/10/2021 Midstream Urine  Final   06/09/2021 Blood Left Arm  Final   06/09/2021 Blood PICC  Final      No results found for: CMV    Last check of C difficile  C Diff Toxin B PCR   Date Value Ref Range Status   05/16/2018 Negative NEG^Negative Final     Comment:     Negative: Clostridium difficile target DNA sequences NOT detected, presumed   negative for Clostridium difficile toxin B or the number of bacteria present   may be below the limit of detection for the test.  FDA approved assay performed using [employer] real-time PCR.  A negative result does not exclude actual disease due to Clostridium difficile   and may be due to improper collection, handling and storage of the specimen   or the number of organisms in the specimen is below the detection limit of the   assay.       C Difficile Toxin B by PCR   Date Value Ref Range Status   12/03/2021 Negative Negative Final     Comment:     A negative result does not exclude actual disease due to C. difficile and may be due to improper collection, handling and storage of the specimen or the number of organisms in the specimen is below the detection limit of the assay.         Virology:  CMV viral loads    CMV viral loads    CMV DNA IU/mL   Date Value Ref Range Status   10/05/2022 <137 (A) Not Detected IU/mL Final     Comment:     CMV DNA detected, less than 137 IU/mL     CMV DNA IU/mL, Instrument   Date Value Ref Range Status   09/14/2022 1,177 (H) <1 IU/mL Final   08/24/2022 181 (H) <1 IU/mL Final   08/17/2022 244 (H) <1 IU/mL Final   08/10/2022 467 (H) <1 IU/mL Final   08/03/2022 211 (H) <1 IU/mL Final   07/27/2022 206 (H) <1 IU/mL Final   07/20/2022 259 (H) <1 IU/mL Final   07/13/2022 932 (H) <1 IU/mL Final   07/06/2022 3,537 (H) <1 IU/mL Final     CMV viral loads    Recent Labs   Lab Test 10/05/22  0749 09/21/22  2844  09/14/22  0747 08/31/22  0748 08/24/22  0740 08/17/22  0750 08/10/22  0740 08/03/22  0753 07/27/22  0756 07/20/22  0805 07/13/22  0758 07/06/22  0801   CMVQNT <137*   < >  --    < >  --   --   --   --   --   --   --   --    CMVRESINST  --   --  1,177*  --  181* 244* 467* 211* 206* 259* 932* 3,537*   CMVLOG <2.1   < > 3.1   < > 2.3 2.4 2.7 2.3 2.3 2.4 3.0 3.5    < > = values in this interval not displayed.       CMV viral loads    CMV DNA IU/mL   Date Value Ref Range Status   10/05/2022 <137 (A) Not Detected IU/mL Final     Comment:     CMV DNA detected, less than 137 IU/mL   09/28/2022 <137 (A) Not Detected IU/mL Final     Comment:     CMV DNA detected, less than 137 IU/mL   09/21/2022 <137 (A) Not Detected IU/mL Final     Comment:     CMV DNA detected, less than 137 IU/mL   09/07/2022 <137 (A) Not Detected IU/mL Final     Comment:     CMV DNA detected, less than 137 IU/mL   08/31/2022 <137 (A) Not Detected IU/mL Final     Comment:     CMV DNA detected, less than 137 IU/mL     CMV DNA IU/mL, Instrument   Date Value Ref Range Status   09/14/2022 1,177 (H) <1 IU/mL Final   08/24/2022 181 (H) <1 IU/mL Final   08/17/2022 244 (H) <1 IU/mL Final   08/10/2022 467 (H) <1 IU/mL Final   08/03/2022 211 (H) <1 IU/mL Final   07/27/2022 206 (H) <1 IU/mL Final   07/20/2022 259 (H) <1 IU/mL Final   07/13/2022 932 (H) <1 IU/mL Final   07/06/2022 3,537 (H) <1 IU/mL Final     CMV log   Date Value Ref Range Status   10/05/2022 <2.1  Final   09/28/2022 <2.1  Final   09/21/2022 <2.1  Final   09/14/2022 3.1  Final   09/07/2022 <2.1  Final   08/31/2022 <2.1  Final   08/24/2022 2.3  Final   08/17/2022 2.4  Final   08/10/2022 2.7  Final   08/03/2022 2.3  Final   07/27/2022 2.3  Final   07/20/2022 2.4  Final   07/13/2022 3.0  Final   07/06/2022 3.5  Final   06/29/2022 3.9  Final   06/22/2022 4.4  Final   06/14/2022 4.8  Final       CMV resistance testing  Recent Labs   Lab Test 07/27/22  0759   CMVCID Sensitive   CMVFOS Sensitive   CMVGAN  Resistant     CMV Cidofovir Resist   Date Value Ref Range Status   07/27/2022 Sensitive  Final     CMV Foscarnet Resist   Date Value Ref Range Status   07/27/2022 Sensitive  Final     CMV Ganciclovir Resis   Date Value Ref Range Status   07/27/2022 Resistant  Final     Comment:       The following UL54 mutations were detected: None  The following UL97 mutations were detected: C603W  INTERPRETIVE INFORMATION: CMV Antiviral Resistance    Codons 457-630 of the UL97 gene and codons 393-1000 of the   UL54 gene are sequenced. Mutations associated with   resistance to ganciclovir, cidofovir, and foscarnet are   reported. Mutations in viral sub-populations below 20   percent of total may not be detected.    This test was developed and its performance characteristics   determined by [facility]. It has not been cleared or   approved by the US Food and Drug Administration. This test   was performed in a CLIA certified laboratory and is   intended for clinical purposes.  Performed By: [facility]  [address] [place] [id number]  : Ariel Frank MD, PhD        EBV viral loads   No lab results found.  No results found for: EBVDN, EBRES, EBVDN, EBVSP, EBVPC, EBVPCR    Human Herpes Virus 6 viral loads    No results found for: H6RES No results found for: H6SPEC    CMV Antibody IgG   Date Value Ref Range Status   11/17/2021 No detectable antibody. No detectable antibody.  Final   08/18/2021 No detectable antibody. No detectable antibody.  Final     CMV Antibody IgM   Date Value Ref Range Status   11/17/2021 Negative Negative Final   08/18/2021 Negative Negative Final     No results found for: EBIG2, EBIGM, EBVIGG, EBIGG, EBVAGN, TQ0702, TOXG      Pathology:  Colon bx 6/8/2022  Final Diagnosis   A.  CECAL ULCER, BIOPSY :  - Colonic mucosa with ulceration and necroinflammatory debris  - POSITIVE for CMV by immunohistochemistry  - Negative for HSV by immunohistochemistry    B.  CECUM AND  ASCENDING COLON, BIOPSIES :  - Colonic mucosa with mild crypt distortion and mild chronic inflammation (Re grade 1)  - Negative for dysplasia      C. HEPATIC FLEXURE AND TRANSVERSE COLON, BIOPSIES :  - Colonic mucosa with mild crypt distortion no significant inflammation (Re grade 0)  - Negative for dysplasia      D. DISTAL AND TRANSVERSE COLON, BIOPSIES :  - Colonic mucosa with no significant inflammation (Re grade 0)  - Negative for dysplasia     E. DESCENDING COLON, BIOPSIES :  - Colonic mucosa with mild crypt distortion and mild patchy chronic inflammation (Re grade 1)  - Negative for dysplasia      F. RECTAL BIOPSIES :  - Colonic mucosa with no significant inflammation (Re grade 0)  - Negative for dysplasia      Liver biopsy 5/17/2022  Final Diagnosis   A.  LIVER, ALLOGRAFT, NEEDLE CORE BIOPSY:  - Mild focal portal inflammation indeterminate for acute cellular rejection (CHIU: 2-3/9)       Forest Dasilva MD

## 2022-10-11 NOTE — PATIENT INSTRUCTIONS
Mrs. Nava,     Please continue maribavir, call [facility] pharmacy ASAP to refill your prescription and let me know if you have a problem.   Continue CMV PCR weekly for now.   I will check antibodies in your system and see if we need to give you more antibodies to boost your immune system (IVIG).   You should be able to receive the bivalent COVID-19 booster vaccine, the seasonal influenza vaccine.   You also need to receive a pneumonia vaccine called: 23-valent pneumovax.   Unfortunately, you need to restart the shingrix vaccine series and the hepatitis B vaccine series.   Please call [phone] to schedule an appointment with me in 2 months virtual or in-person.     Thanks,   Forest Dasilva MD

## 2022-10-11 NOTE — PROGRESS NOTES
Berta Nava  is being evaluated via a billable video visit.      Patient denies any changes since echeck-in regarding medication and allergies and states all information entered during echeck-in remains accurate.    How would you like to obtain your AVS? [place]  For the video visit, send the invitation by: Text to cell phone: [phone]  Will anyone else be joining your video visit? No      Start time:4:29 PM   End time: 4:47 PM  AmWell  From home.       [facility]    Transplant Infectious Diseases Outpatient Consultation     Patient:  Berta Nava, Date of birth 1965, Medical record number [id number]  Date of Visit:  10/11/2022  Consult requested by Dr. Leventhal for evaluation of CMV colitis.           Recommendations:   1. Continue maribavir for the long foreseen future.   2. Will repeat CMV-specific T-cell immunity in 2-3 months.   3. Will discuss with the transplant team if there are plans to further decrease IS given she is approaching the one year landmark.   4. Will check IgG and replete if needed.   5. Continue weekly CMV PCR.   6. OK to receive the bivalent COVID-19 booster and influenza vaccine.   7. Will need to re-initiate the shingrix vaccine and hepatitis A/B vaccine.   8. Will need the penumovax 23-valent vaccine.    9. OK to proceed with planned colonoscopy.     RTC: 2 months, virtual or in-person.         Summary of Presentation:   Transplants:  11/18/2021 (Liver), Postoperative day:  327     This patient is a 56 year old female with PBC-liver cirrhosis s/p OLT in 11/2021. Was on TAC/prednisone then MMF was re-introduced due to mild rejection.   MMF was again discontinued after CMV infection.   Currently on TAC/prednisone.   History of UC with screening colonoscopy showing an evidence of CMV colitis. CMV PCR checked and was at 4.8 log.         Active Problems and Infectious Diseases Issues:   1. Primary, donor-derived CMV viremia and colitis.   This  followed an escalation of IS due to mild rejection. MMF was discontinued.   VGCV induction therapy was started on 6/15/2022 and increased to 1350 mg bid due to slow response (1.3 log decline over 3 weeks). The CMV continued to decline but did not go below 2.1 log. 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    Maribavir was started on 8/16/2022 with adequate response with declining VL to <2.1. However, viremia recurred only one week after stopping maribavir and initiating letermovir suppressive therapy.   Maribavir was resumed around 9/15/2022, again with adequate response.     CMV-specific T-cell showed poor CMV-specific count (0.07). will continue maribavir, discuss IS therapy with the transplant team and repeat CMV-specific immunity test in 2-3 months. Will also check IgG to check if we can boost her with IVIG.         Old Problems and Infectious Diseases Issues:   1. SBP and BSI with S mitis 4/29/2021; on IV ABx since 4/29/2021 for possible association with liver abscess.    2. Indeterminate QuantiFeron due to low reaction to mitogen likely due to the patient's underlying liver disease. This patient is low risk for TB, CXR unremarkable; no further evaluations were deemed necessary.     Other Infectious Disease issues include:  - QTc: 465 as of 11/2021.   - PCP prophylaxis: bactrim until 4/24/2022 (5-6 months).   - Serostatus: CMV D+/R-, EBV D+/R+, HSV1?/2?, VZV ?. Currently on maribavir.    - Immunization status: This patient received the third dose of the COVID-19 vaccine on 3/21/2022, she also received evusheld on 5/31/2022. Otherwise she needs to repeat the shingrix vaccine series since she only received one dose on 6/21/2021. Will need conjugated pneumonia vaccine, the COVID-19 booster and the seasonal influenza.    - Gamma globulin status: ?      Attestation:  Total duration of visit including chart review, reviewing labs and imaging, interviewing and examining the patient,  documentation, and sending communication to the patient and to the primary treating team, all at the same day of this encounter, is: 60 minutes.   Forest Dasilva MD    Contact information available via [facility] Paging/Directory     10/11/2022           Interim History:   No new complaints. No fever, no diarrhea, no N/V. She dose have dysgeusia with maribavir.         History of the Infectious Disease lllness:       Transplants:  11/18/2021 (Liver); Postoperative day:  327    55 yo female with PBC-liver cirrhosis s/p OLT in 11/2021. Also history of UC for which she undergoes screening colonoscopy.   During the last screening colonoscopy on 6/8/2022, she was found to have a single, 2mm cecal ulcer. The biopsy was c/w CMV infection.   MMF was discontinued.   The patient had no fever, diarrhea, N/V or abdominal pain. She was started on PO VGCV, later CMV VL was 4.8 log. Due to lack of symptoms, PO VGCV was continued. Unfortunately, after initial response, VL persisted at low level and did not respond to high dose VGCV. 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 CMV VL recurred while on suppressive therapy with letermovir. Maribavir was resumed with adequate response. CMV-specific immunity was not adequate enough to stop maribavir.   The patient was asymptomatic all along.     The patient had mild ACR on a liver biopsy 5/2022, MMF was readminister and TAC/prednisone were maintained.     Exposure History:  Lives with her  in a single family house in the [place] area.   Does some gardening but wear gloves.   Traveled to [facility] in the past.   The only international travel was to [place], to a resort only.   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   Has two grown up children, do not live with her.   Has a dog.          Review of Systems:   As mentioned in the interim history otherwise negative by reviewing constitutional symptoms, central and peripheral neurological  systems, respiratory system, cardiac system, GI system,  system, musculoskeletal, skin, allergy, and lymphatics.           Immunizations:     Immunization History   Administered Date(s) Administered     COVID-19,PF,Moderna 02/25/2021, 03/18/2021, 04/15/2021, 03/21/2022, 09/13/2022     Flu, Unspecified 10/18/2016, 11/01/2017     HepA-Adult 05/12/2021     HepB-Adult 05/11/2021     Influenza (IIV3) PF 11/10/2010     Influenza Vaccine IM > 6 months Valent IIV4 (Alfuria,Fluzone) 09/03/2015, 12/30/2019, 09/15/2020     Influenza Vaccine, 6+MO IM (QUADRIVALENT W/PRESERVATIVES) 10/17/2018     Pneumo Conj 13-V (2010&after) 06/12/2021     Td (Adult), Adsorbed 03/06/1995, 05/27/2005     Tdap (Adacel,Boostrix) 07/01/2014     Twinrix A/B 06/12/2021     Zoster vaccine recombinant adjuvanted (SHINGRIX) 06/21/2021             Allergies:     Allergies   Allergen Reactions     Diagnostic X-Ray Materials Hives     PATIENT HAD HIVE REACTION AFTER ADMINISTERING CT CONTRAST DYE.       Contrast Dye              Medications:     Current Outpatient Medications   Medication Sig     amLODIPine (NORVASC) 5 MG tablet TAKE 2 TABLETS(10 MG) BY MOUTH DAILY     aspirin (ASA) 81 MG EC tablet Take 1 tablet (81 mg) by mouth daily     maribavir (LIVTENCITY) 200 MG tablet Take 2 tablets (400 mg) by mouth 2 times daily     multivitamin (CENTRUM SILVER) tablet Take 1 tablet by mouth daily     predniSONE (DELTASONE) 5 MG tablet Take 2 tablets (10 mg) by mouth daily (Patient taking differently: Take 10 mg by mouth See Admin Instructions Take 7.5 mg daily x 1 week, then 5mg daily x 1 week then 2.5mg daily x 1 week then stop.)     tacrolimus (GENERIC EQUIVALENT) 1 MG capsule Take 2 capsules (2 mg) by mouth every 12 hours     ursodiol (ACTIGALL) 300 MG capsule Take 1 capsule (300 mg) by mouth 2 times daily     No current facility-administered medications for this visit.            Physical Exam:   No vitals are available during this virtual visit.    Constitutional: awake, alert, cooperative, no apparent distress and appears at stated age, well nourished.   Head, ENT, Eyes, and Neck: Normocephalic. Not pale.          Laboratory Data:     No results found for: ACD4    Inflammatory Markers    Recent Labs   Lab Test 11/17/21  1908 08/19/21  0018 06/08/21  1602 06/01/21  0915 05/25/21  1455 05/21/21  1500 05/18/21  1010   CRP 54.0* 40.0* 23.1* 16.1* 14.3* 16.1* 21.4*       Immune Globulin Studies    No lab results found.    Metabolic Studies    Recent Labs   Lab Test 10/05/22  0749 09/07/22  0745 08/10/22  0740 08/01/22  1653 07/27/22  0756 07/20/22  0805 12/06/21  1031 12/03/21  1145 11/23/21  0551 11/23/21  0538 11/19/21  0127 11/19/21  0126    140 142 142 139 140   < > 142   < >  --    < >  --    POTASSIUM 4.3 3.8 3.5 3.8 4.1 4.3   < > 4.4   < >  --    < >  --    CHLORIDE 113* 112* 111* 111* 111* 110*   < > 113*   < >  --    < >  --    CO2 21 22 23 22 21 25   < > 23   < >  --    < >  --    ANIONGAP 8 6 8 9 7 5   < > 6   < >  --    < >  --    BUN 22 21 24 31* 23 32*   < > 24   < >  --    < >  --    CR 0.81 0.83 0.85 0.81 0.68 0.75   < > 0.65   < >  --    < >  --    GFRESTIMATED 83 81 79 84 >90 >90   < > >90   < >  --    < >  --    * 93 91 93 84 94   < > 97   < >  --    < >  --    A1C  --   --   --   --   --   --   --   --   --   --   --  5.4   MARIELENA 9.4 9.0 8.8 9.1 8.7 9.1   < > 8.4*   < >  --    < >  --    PHOS 4.4 4.0 3.3  --  3.3  --    < > 3.5   < > 1.9*   < >  --    MAG  --   --  1.8  --  2.0  --    < > 1.7   < > 2.2   < >  --    LACT  --   --   --   --   --   --   --  0.6*  --  0.8   < >  --     < > = values in this interval not displayed.       Hepatic Studies    Recent Labs   Lab Test 10/05/22  0749 09/07/22  0745 08/10/22  0740 08/01/22  1653 07/27/22  0756 07/20/22  0805   BILITOTAL 0.6 0.6 0.6 0.4 0.5 0.6   ALKPHOS 66 50 49 52 55 57   PROTTOTAL 7.7 7.1 6.9 7.4 7.2 7.4   ALBUMIN 3.7 3.6 3.6 3.9 3.8 3.7   AST 18 25 21 18 17 16   ALT 17 32 30  28 28 32       Hematology Studies     Recent Labs   Lab Test 09/07/22  0745 08/10/22  0740 08/01/22  1653 07/27/22  0756 07/20/22  0805 07/13/22  0758 07/06/22  0801 06/21/21  0938 06/12/21  1259   WBC 4.5 2.2* 2.3* 2.0* 2.1* 2.3* 2.2*   < > 21.3*   ANEU  --  0.9* 1.4* 1.1*  --  1.4* 1.2*  --  18.5*   ALYM  --  1.2 0.9 0.9  --  0.9 0.9  --  1.1   BRIGIDO  --  0.1 0.0 0.0  --  0.0 0.1  --  1.5*   AEOS  --  0.0 0.0 0.0  --  0.0 0.0  --  0.0   HGB 11.7 12.7 12.0 12.4 12.6 12.9 12.1   < > 10.3*   HCT 33.6* 37.9 36.2 36.8 37.2 38.9 37.9   < > 32.3*    171 156 175 221 218 204   < > 291    < > = values in this interval not displayed.       Clotting Studies    Recent Labs   Lab Test 05/17/22  1011 12/13/21  1022 12/03/21  1145 11/22/21  0419 11/21/21  0318 11/20/21  0508   INR 1.09 1.12 1.13 1.22* 1.34*  --    PTT  --   --  36 29 31 33       Urine Studies    Recent Labs   Lab Test 07/27/22  0803 12/03/21  1342 11/24/21  2014 11/20/21  1030 11/06/21  0509 10/25/21  1339 09/01/21  1757   URINEPH 7.0 6.0 5.0 5.0 5.5 5.5 5.5   NITRITE Negative Negative Negative Negative Negative Negative Negative   LEUKEST Negative Negative Negative Negative Negative Negative Negative   WBCU  --  1  --  3 1 1 2         Microbiology:  Last 6 Culture results with specimen source  Culture Micro   Date Value Ref Range Status   06/10/2021 No growth  Final   06/10/2021 Canceled, Test credited  Final   06/10/2021 Duplicate request  Final   06/10/2021   Final    Notification of test cancellation was given to  Christine Torres RN from 7C. 6/10/21 at 0812.TV.     06/10/2021 No growth  Final   06/09/2021 No growth  Final    Specimen Description   Date Value Ref Range Status   06/10/2021 Ascites Fluid  Final   06/10/2021 Ascites Fluid  Final   06/10/2021 Midstream Urine  Final   06/10/2021 Midstream Urine  Final   06/09/2021 Blood Left Arm  Final   06/09/2021 Blood PICC  Final      No results found for: CMV    Last check of C difficile  C Diff Toxin B  PCR   Date Value Ref Range Status   05/16/2018 Negative NEG^Negative Final     Comment:     Negative: Clostridium difficile target DNA sequences NOT detected, presumed   negative for Clostridium difficile toxin B or the number of bacteria present   may be below the limit of detection for the test.  FDA approved assay performed using [employer] real-time PCR.  A negative result does not exclude actual disease due to Clostridium difficile   and may be due to improper collection, handling and storage of the specimen   or the number of organisms in the specimen is below the detection limit of the   assay.       C Difficile Toxin B by PCR   Date Value Ref Range Status   12/03/2021 Negative Negative Final     Comment:     A negative result does not exclude actual disease due to C. difficile and may be due to improper collection, handling and storage of the specimen or the number of organisms in the specimen is below the detection limit of the assay.         Virology:  CMV viral loads    CMV viral loads    CMV DNA IU/mL   Date Value Ref Range Status   10/05/2022 <137 (A) Not Detected IU/mL Final     Comment:     CMV DNA detected, less than 137 IU/mL     CMV DNA IU/mL, Instrument   Date Value Ref Range Status   09/14/2022 1,177 (H) <1 IU/mL Final   08/24/2022 181 (H) <1 IU/mL Final   08/17/2022 244 (H) <1 IU/mL Final   08/10/2022 467 (H) <1 IU/mL Final   08/03/2022 211 (H) <1 IU/mL Final   07/27/2022 206 (H) <1 IU/mL Final   07/20/2022 259 (H) <1 IU/mL Final   07/13/2022 932 (H) <1 IU/mL Final   07/06/2022 3,537 (H) <1 IU/mL Final     CMV viral loads    Recent Labs   Lab Test 10/05/22  0749 09/21/22  0758 09/14/22  0747 08/31/22  0748 08/24/22  0740 08/17/22  0750 08/10/22  0740 08/03/22  0753 07/27/22  0756 07/20/22  0805 07/13/22  0758 07/06/22  0801   CMVQNT <137*   < >  --    < >  --   --   --   --   --   --   --   --    CMVRESINST  --   --  1,177*  --  181* 244* 467* 211* 206* 259* 932* 3,537*   CMVLOG <2.1   <  > 3.1   < > 2.3 2.4 2.7 2.3 2.3 2.4 3.0 3.5    < > = values in this interval not displayed.       CMV viral loads    CMV DNA IU/mL   Date Value Ref Range Status   10/05/2022 <137 (A) Not Detected IU/mL Final     Comment:     CMV DNA detected, less than 137 IU/mL   09/28/2022 <137 (A) Not Detected IU/mL Final     Comment:     CMV DNA detected, less than 137 IU/mL   09/21/2022 <137 (A) Not Detected IU/mL Final     Comment:     CMV DNA detected, less than 137 IU/mL   09/07/2022 <137 (A) Not Detected IU/mL Final     Comment:     CMV DNA detected, less than 137 IU/mL   08/31/2022 <137 (A) Not Detected IU/mL Final     Comment:     CMV DNA detected, less than 137 IU/mL     CMV DNA IU/mL, Instrument   Date Value Ref Range Status   09/14/2022 1,177 (H) <1 IU/mL Final   08/24/2022 181 (H) <1 IU/mL Final   08/17/2022 244 (H) <1 IU/mL Final   08/10/2022 467 (H) <1 IU/mL Final   08/03/2022 211 (H) <1 IU/mL Final   07/27/2022 206 (H) <1 IU/mL Final   07/20/2022 259 (H) <1 IU/mL Final   07/13/2022 932 (H) <1 IU/mL Final   07/06/2022 3,537 (H) <1 IU/mL Final     CMV log   Date Value Ref Range Status   10/05/2022 <2.1  Final   09/28/2022 <2.1  Final   09/21/2022 <2.1  Final   09/14/2022 3.1  Final   09/07/2022 <2.1  Final   08/31/2022 <2.1  Final   08/24/2022 2.3  Final   08/17/2022 2.4  Final   08/10/2022 2.7  Final   08/03/2022 2.3  Final   07/27/2022 2.3  Final   07/20/2022 2.4  Final   07/13/2022 3.0  Final   07/06/2022 3.5  Final   06/29/2022 3.9  Final   06/22/2022 4.4  Final   06/14/2022 4.8  Final       CMV resistance testing  Recent Labs   Lab Test 07/27/22  0759   CMVCID Sensitive   CMVFOS Sensitive   CMVGAN Resistant     CMV Cidofovir Resist   Date Value Ref Range Status   07/27/2022 Sensitive  Final     CMV Foscarnet Resist   Date Value Ref Range Status   07/27/2022 Sensitive  Final     CMV Ganciclovir Resis   Date Value Ref Range Status   07/27/2022 Resistant  Final     Comment:       The following UL54 mutations were  detected: None  The following UL97 mutations were detected: C603W  INTERPRETIVE INFORMATION: CMV Antiviral Resistance    Codons 457-630 of the UL97 gene and codons 393-1000 of the   UL54 gene are sequenced. Mutations associated with   resistance to ganciclovir, cidofovir, and foscarnet are   reported. Mutations in viral sub-populations below 20   percent of total may not be detected.    This test was developed and its performance characteristics   determined by [facility]. It has not been cleared or   approved by the US Food and Drug Administration. This test   was performed in a CLIA certified laboratory and is   intended for clinical purposes.  Performed By: [facility]  [address] [place] [id number]  : Ariel Frank MD, PhD        EBV viral loads   No lab results found.  No results found for: EBVDN, EBRES, EBVDN, EBVSP, EBVPC, EBVPCR    Human Herpes Virus 6 viral loads    No results found for: H6RES No results found for: H6SPEC    CMV Antibody IgG   Date Value Ref Range Status   11/17/2021 No detectable antibody. No detectable antibody.  Final   08/18/2021 No detectable antibody. No detectable antibody.  Final     CMV Antibody IgM   Date Value Ref Range Status   11/17/2021 Negative Negative Final   08/18/2021 Negative Negative Final     No results found for: EBIG2, EBIGM, EBVIGG, EBIGG, EBVAGN, XH6088, TOXG      Pathology:  Colon bx 6/8/2022  Final Diagnosis   A.  CECAL ULCER, BIOPSY :  - Colonic mucosa with ulceration and necroinflammatory debris  - POSITIVE for CMV by immunohistochemistry  - Negative for HSV by immunohistochemistry    B.  CECUM AND ASCENDING COLON, BIOPSIES :  - Colonic mucosa with mild crypt distortion and mild chronic inflammation (Re grade 1)  - Negative for dysplasia      C. HEPATIC FLEXURE AND TRANSVERSE COLON, BIOPSIES :  - Colonic mucosa with mild crypt distortion no significant inflammation (Re grade 0)  - Negative for dysplasia       D. DISTAL AND TRANSVERSE COLON, BIOPSIES :  - Colonic mucosa with no significant inflammation (Re grade 0)  - Negative for dysplasia     E. DESCENDING COLON, BIOPSIES :  - Colonic mucosa with mild crypt distortion and mild patchy chronic inflammation (Re grade 1)  - Negative for dysplasia      F. RECTAL BIOPSIES :  - Colonic mucosa with no significant inflammation (Re grade 0)  - Negative for dysplasia      Liver biopsy 5/17/2022  Final Diagnosis   A.  LIVER, ALLOGRAFT, NEEDLE CORE BIOPSY:  - Mild focal portal inflammation indeterminate for acute cellular rejection (CHIU: 2-3/9)

## 2022-10-12 ENCOUNTER — TELEPHONE (OUTPATIENT)
Dept: TRANSPLANT | Facility: CLINIC | Age: 57
End: 2022-10-12

## 2022-10-12 ENCOUNTER — LAB (OUTPATIENT)
Dept: LAB | Facility: CLINIC | Age: 57
End: 2022-10-12
Payer: COMMERCIAL

## 2022-10-12 ENCOUNTER — IMMUNIZATION (OUTPATIENT)
Dept: INTERNAL MEDICINE | Facility: CLINIC | Age: 57
End: 2022-10-12
Payer: COMMERCIAL

## 2022-10-12 DIAGNOSIS — Z23 HIGH PRIORITY FOR 2019-NCOV VACCINE: ICD-10-CM

## 2022-10-12 DIAGNOSIS — R52 GENERALIZED BODY ACHES: Primary | ICD-10-CM

## 2022-10-12 DIAGNOSIS — B34.9 INFECTION DUE TO ANTIMICROBIAL RESISTANT VIRUS: ICD-10-CM

## 2022-10-12 DIAGNOSIS — Z94.4 LIVER TRANSPLANTED (H): ICD-10-CM

## 2022-10-12 DIAGNOSIS — Z23 NEED FOR PROPHYLACTIC VACCINATION AND INOCULATION AGAINST INFLUENZA: ICD-10-CM

## 2022-10-12 DIAGNOSIS — Z16.33 INFECTION DUE TO ANTIMICROBIAL RESISTANT VIRUS: ICD-10-CM

## 2022-10-12 DIAGNOSIS — B25.9 CYTOMEGALOVIRUS (CMV) VIREMIA (H): ICD-10-CM

## 2022-10-12 DIAGNOSIS — B25.9 CMV (CYTOMEGALOVIRUS INFECTION) (H): ICD-10-CM

## 2022-10-12 DIAGNOSIS — Z94.4 S/P LIVER TRANSPLANT (H): ICD-10-CM

## 2022-10-12 LAB
BASOPHILS # BLD AUTO: 0 10E3/UL (ref 0–0.2)
BASOPHILS NFR BLD AUTO: 1 %
EOSINOPHIL # BLD AUTO: 0.3 10E3/UL (ref 0–0.7)
EOSINOPHIL NFR BLD AUTO: 6 %
ERYTHROCYTE [DISTWIDTH] IN BLOOD BY AUTOMATED COUNT: 13.8 % (ref 10–15)
HCT VFR BLD AUTO: 29.9 % (ref 35–47)
HGB BLD-MCNC: 10.4 G/DL (ref 11.7–15.7)
IMM GRANULOCYTES # BLD: 0.1 10E3/UL
IMM GRANULOCYTES NFR BLD: 1 %
LYMPHOCYTES # BLD AUTO: 1.9 10E3/UL (ref 0.8–5.3)
LYMPHOCYTES NFR BLD AUTO: 43 %
MCH RBC QN AUTO: 31.3 PG (ref 26.5–33)
MCHC RBC AUTO-ENTMCNC: 34.8 G/DL (ref 31.5–36.5)
MCV RBC AUTO: 90 FL (ref 78–100)
MONOCYTES # BLD AUTO: 0.3 10E3/UL (ref 0–1.3)
MONOCYTES NFR BLD AUTO: 6 %
NEUTROPHILS # BLD AUTO: 1.9 10E3/UL (ref 1.6–8.3)
NEUTROPHILS NFR BLD AUTO: 43 %
PLATELET # BLD AUTO: 181 10E3/UL (ref 150–450)
RBC # BLD AUTO: 3.32 10E6/UL (ref 3.8–5.2)
TACROLIMUS BLD-MCNC: 5.6 UG/L (ref 5–15)
TME LAST DOSE: NORMAL H
TME LAST DOSE: NORMAL H
WBC # BLD AUTO: 4.4 10E3/UL (ref 4–11)

## 2022-10-12 PROCEDURE — 80197 ASSAY OF TACROLIMUS: CPT

## 2022-10-12 PROCEDURE — 36415 COLL VENOUS BLD VENIPUNCTURE: CPT

## 2022-10-12 PROCEDURE — 91312 COVID-19,PF,PFIZER BOOSTER BIVALENT: CPT

## 2022-10-12 PROCEDURE — 99207 PR NO CHARGE NURSE ONLY: CPT

## 2022-10-12 PROCEDURE — 85025 COMPLETE CBC W/AUTO DIFF WBC: CPT

## 2022-10-12 PROCEDURE — 90682 RIV4 VACC RECOMBINANT DNA IM: CPT

## 2022-10-12 PROCEDURE — 82784 ASSAY IGA/IGD/IGG/IGM EACH: CPT

## 2022-10-12 PROCEDURE — 90471 IMMUNIZATION ADMIN: CPT

## 2022-10-12 PROCEDURE — 0124A COVID-19,PF,PFIZER BOOSTER BIVALENT: CPT

## 2022-10-12 NOTE — TELEPHONE ENCOUNTER
"Call from Berta.  She says body aches are quite a bit better, but still not gone.  She admits that she was having body aches prior to stopping prednisone.  She stopped prednisone 8/26, cortisol test ws normal.  She feels like she has gained weight.  She feels like her whole body and face has changed.  She keeps saying \"I'm chubby\".  She has a pond behind her house that is 2 miles long, she used to walk that 2-3 times per day, now can only do once at most due to the achiness that she feels along with very slight shortness of breath.  She joined a gym but didn't feel like she could do as much as she liked.  She joins the transplant support group and talked with a patient who also has PSC - he suggests that maybe she has arthritis. She has an appt w/ Kendrick Crawford for repeat colonoscopy at the end of October.   She is wondering if the body aches might be related to her starting maribavir which she started on Aug 16 - she discussed w/ Dr. Dasilva, he didn't think so.    I suggested she meet w/ a rheumatologist, She agreed.  Referral placed. Message to Dr. Leventhal.    "

## 2022-10-13 LAB
CMV DNA SPEC NAA+PROBE-ACNC: <137 IU/ML
CMV DNA SPEC NAA+PROBE-LOG#: <2.1 {LOG_COPIES}/ML
IGG SERPL-MCNC: 1145 MG/DL (ref 610–1616)

## 2022-10-14 ENCOUNTER — TELEPHONE (OUTPATIENT)
Dept: INFECTIOUS DISEASES | Facility: CLINIC | Age: 57
End: 2022-10-14

## 2022-10-14 ENCOUNTER — MYC MEDICAL ADVICE (OUTPATIENT)
Dept: INFECTIOUS DISEASES | Facility: CLINIC | Age: 57
End: 2022-10-14

## 2022-10-14 DIAGNOSIS — B25.9 CYTOMEGALOVIRUS (CMV) VIREMIA (H): Primary | ICD-10-CM

## 2022-10-14 NOTE — TELEPHONE ENCOUNTER
Expedited review.     Prior Authorization Retail Medication Request    Medication/Dose: maribavir (LIVTENCITY) 200 MG tablet  ICD code (if different than what is on RX):  Cytomegalovirus (CMV) viremia (H) [B25.9],  Infection due to antimicrobial resistant virus [B34.9, Z16.33]     Previously Tried and Failed: valGANciclovir (VALCYTE) 11/18/21-8/15/22, letermovir (PREVYMIS) 480 MG TABS tablet 8/1/22-8/15/22     Rationale:  CMV is resistant to Ganciclovir    with high grade resistance mutation  C603W.   Continued VGCV at high dose of 1350 mg bid till  discontinued on 8/15/22 due to high grade mutation resistance to VGCV and added letermovir 8/1-8/15/22 until patient was able to get on maribavir.    Insurance Name:  [employer]  Insurance ID:  [id number]        George Israel RN  Infectious Disease 12:18 PM 10/14/22

## 2022-10-14 NOTE — TELEPHONE ENCOUNTER
Central Prior Authorization Team   Phone: [phone]    PA Initiation    Medication: Livtencity 200MG tablets    Insurance Company: [employer] - Phone [phone] Fax [phone]  Pharmacy Filling the Rx: [facility] [place] [facility] [place] [place] [address] [address]  Filling Pharmacy Phone: [phone]  Filling Pharmacy Fax:    Start Date: 10/14/2022

## 2022-10-14 NOTE — TELEPHONE ENCOUNTER
[facility] Call Center    Phone Message    May a detailed message be left on voicemail: yes     Reason for Call: Medication Refill Request    Has the patient contacted the pharmacy for the refill? Yes   Name of medication being requested: Per pharmacy URGENT 3 days left   Prior Auth is needed to refill     maribavir (LIVTENCITY) 200 MG tablet      Provider who prescribed the medication: Gala Fernandes DO      Pharmacy: [facility] - [address]    Date medication is needed: asap           Action Taken: Other: ENDO    Travel Screening: Not Applicable

## 2022-10-14 NOTE — TELEPHONE ENCOUNTER
Prior Authorization Approval    Authorization Effective Date: 9/14/2022  Authorization Expiration Date: 12/13/2022  Medication:   Approved Dose/Quantity:   Reference #:     Insurance Company: [employer] - Phone [phone] Fax [phone]  Expected CoPay:       CoPay Card Available:      Foundation Assistance Needed:    Which Pharmacy is filling the prescription (Not needed for infusion/clinic administered): [facility] [place] [address] [address]  Pharmacy Notified: Yes  Patient Notified: Yes

## 2022-10-24 ENCOUNTER — TELEPHONE (OUTPATIENT)
Dept: GASTROENTEROLOGY | Facility: CLINIC | Age: 57
End: 2022-10-24

## 2022-10-24 DIAGNOSIS — K52.9 COLITIS: Primary | ICD-10-CM

## 2022-10-24 RX ORDER — BISACODYL 5 MG/1
TABLET, DELAYED RELEASE ORAL
Qty: 4 TABLET | Refills: 0 | Status: SHIPPED | OUTPATIENT
Start: 2022-10-24 | End: 2022-11-03

## 2022-10-24 NOTE — TELEPHONE ENCOUNTER
Patient scheduled for colonoscopy on 10.31.22.     Covid test scheduled ?. Discuss at home test option.     Arrival time: 1010    [place] [facility] [place]    Sedation type: MAC    Indication for procedure: Colitis    Anticoagulations? No     Bowel prep recommendation: Standard golytely    Golytely prep sent [id number] - [place], [place] - [address] [place] [address] [address] [place] [address]  pharmacy. Prep instructions sent via [employer]    Pre visit planning completed.    Sherry Gutiérrez RN

## 2022-10-24 NOTE — TELEPHONE ENCOUNTER
Pre assessment questions completed for upcoming colonoscopy procedure scheduled on 10.31.22    COVID policy reviewed. Patient to complete rapid antigen test one to two days before their scheduled procedure. Patient to bring photo of the results when they come in for their procedure.    Reviewed procedural arrival time 1010 and facility location [facility] [place]    Designated  policy reviewed. Instructed to have someone stay 24 hours post procedure.     Anticoagulation/blood thinners? No    Electronic implanted devices? No    Diabetic? No    Reviewed standard golytely prep instructions with patient. No fiber/iron supplements or foods that contain nuts/seeds prior to procedure.     Patient verbalized understanding and had no questions or concerns at this time.    Sherry Gutiérrez RN

## 2022-10-26 ENCOUNTER — LAB (OUTPATIENT)
Dept: LAB | Facility: CLINIC | Age: 57
End: 2022-10-26
Payer: COMMERCIAL

## 2022-10-26 DIAGNOSIS — B25.9 CYTOMEGALOVIRUS (CMV) VIREMIA (H): ICD-10-CM

## 2022-10-26 LAB — CMV DNA SPEC NAA+PROBE-ACNC: NOT DETECTED IU/ML

## 2022-10-26 PROCEDURE — 36415 COLL VENOUS BLD VENIPUNCTURE: CPT

## 2022-10-27 ENCOUNTER — DOCUMENTATION ONLY (OUTPATIENT)
Dept: TRANSPLANT | Facility: CLINIC | Age: 57
End: 2022-10-27

## 2022-10-31 ENCOUNTER — ANESTHESIA EVENT (OUTPATIENT)
Dept: SURGERY | Facility: AMBULATORY SURGERY CENTER | Age: 57
End: 2022-10-31
Payer: COMMERCIAL

## 2022-10-31 ENCOUNTER — ANESTHESIA (OUTPATIENT)
Dept: SURGERY | Facility: AMBULATORY SURGERY CENTER | Age: 57
End: 2022-10-31
Payer: COMMERCIAL

## 2022-10-31 ENCOUNTER — HOSPITAL ENCOUNTER (OUTPATIENT)
Facility: AMBULATORY SURGERY CENTER | Age: 57
Discharge: HOME | End: 2022-10-31
Attending: INTERNAL MEDICINE | Admitting: INTERNAL MEDICINE
Payer: COMMERCIAL

## 2022-10-31 VITALS — HEART RATE: 100 BPM

## 2022-10-31 VITALS
OXYGEN SATURATION: 100 % | RESPIRATION RATE: 16 BRPM | TEMPERATURE: 97.2 F | DIASTOLIC BLOOD PRESSURE: 71 MMHG | SYSTOLIC BLOOD PRESSURE: 113 MMHG

## 2022-10-31 LAB — COLONOSCOPY: NORMAL

## 2022-10-31 PROCEDURE — 45385 COLONOSCOPY W/LESION REMOVAL: CPT | Mod: 33

## 2022-10-31 PROCEDURE — 88305 TISSUE EXAM BY PATHOLOGIST: CPT | Mod: TC | Performed by: INTERNAL MEDICINE

## 2022-10-31 PROCEDURE — 45380 COLONOSCOPY AND BIOPSY: CPT | Mod: 59,33

## 2022-10-31 RX ORDER — NALOXONE HYDROCHLORIDE 0.4 MG/ML
0.2 INJECTION, SOLUTION INTRAMUSCULAR; INTRAVENOUS; SUBCUTANEOUS
Status: CANCELLED | OUTPATIENT
Start: 2022-10-31

## 2022-10-31 RX ORDER — LIDOCAINE 40 MG/G
CREAM TOPICAL
Status: DISCONTINUED | OUTPATIENT
Start: 2022-10-31 | End: 2022-11-01 | Stop reason: HOSPADM

## 2022-10-31 RX ORDER — PROPOFOL 10 MG/ML
INJECTION, EMULSION INTRAVENOUS CONTINUOUS PRN
Status: DISCONTINUED | OUTPATIENT
Start: 2022-10-31 | End: 2022-10-31

## 2022-10-31 RX ORDER — NALOXONE HYDROCHLORIDE 0.4 MG/ML
0.4 INJECTION, SOLUTION INTRAMUSCULAR; INTRAVENOUS; SUBCUTANEOUS
Status: CANCELLED | OUTPATIENT
Start: 2022-10-31

## 2022-10-31 RX ORDER — FLUMAZENIL 0.1 MG/ML
0.2 INJECTION, SOLUTION INTRAVENOUS
Status: CANCELLED | OUTPATIENT
Start: 2022-10-31 | End: 2022-11-01

## 2022-10-31 RX ORDER — PROCHLORPERAZINE MALEATE 10 MG
10 TABLET ORAL EVERY 6 HOURS PRN
Status: CANCELLED | OUTPATIENT
Start: 2022-10-31

## 2022-10-31 RX ORDER — ONDANSETRON 2 MG/ML
4 INJECTION INTRAMUSCULAR; INTRAVENOUS
Status: DISCONTINUED | OUTPATIENT
Start: 2022-10-31 | End: 2022-11-01 | Stop reason: HOSPADM

## 2022-10-31 RX ORDER — ONDANSETRON 2 MG/ML
4 INJECTION INTRAMUSCULAR; INTRAVENOUS EVERY 6 HOURS PRN
Status: CANCELLED | OUTPATIENT
Start: 2022-10-31

## 2022-10-31 RX ORDER — ONDANSETRON 4 MG/1
4 TABLET, ORALLY DISINTEGRATING ORAL EVERY 6 HOURS PRN
Status: CANCELLED | OUTPATIENT
Start: 2022-10-31

## 2022-10-31 RX ORDER — PROPOFOL 10 MG/ML
INJECTION, EMULSION INTRAVENOUS PRN
Status: DISCONTINUED | OUTPATIENT
Start: 2022-10-31 | End: 2022-10-31

## 2022-10-31 RX ORDER — SODIUM CHLORIDE, SODIUM LACTATE, POTASSIUM CHLORIDE, CALCIUM CHLORIDE 600; 310; 30; 20 MG/100ML; MG/100ML; MG/100ML; MG/100ML
INJECTION, SOLUTION INTRAVENOUS CONTINUOUS PRN
Status: DISCONTINUED | OUTPATIENT
Start: 2022-10-31 | End: 2022-10-31

## 2022-10-31 RX ADMIN — SODIUM CHLORIDE, SODIUM LACTATE, POTASSIUM CHLORIDE, CALCIUM CHLORIDE: 600; 310; 30; 20 INJECTION, SOLUTION INTRAVENOUS at 12:16

## 2022-10-31 RX ADMIN — PROPOFOL 30 MG: 10 INJECTION, EMULSION INTRAVENOUS at 12:22

## 2022-10-31 RX ADMIN — PROPOFOL 30 MG: 10 INJECTION, EMULSION INTRAVENOUS at 12:26

## 2022-10-31 RX ADMIN — PROPOFOL 150 MCG/KG/MIN: 10 INJECTION, EMULSION INTRAVENOUS at 12:20

## 2022-10-31 NOTE — H&P
Berta Nava  [id number]  female  57 year old      Reason for procedure/surgery: follow up CMV colitis vs UC (history of PSC)    Patient Active Problem List   Diagnosis     Cholangitis     Hypokalemia     Essential hypertension     Hyperlipidemia     Mixed hearing loss, bilateral     Neoplasm of uncertain behavior of skin     PSC (primary sclerosing cholangitis)     Sicca syndrome (H)     Ulcerative pancolitis with complication (H)     Secondary esophageal varices without bleeding (H)     Primary sclerosing cholangitis     Liver transplant planned     Acute kidney failure, unspecified (H)     Immunosuppressed status (H)     S/P liver transplant (H)     Anemia due to chronic kidney disease     Liver transplant rejection (H)       Past Surgical History:    Past Surgical History:   Procedure Laterality Date     BENCH LIVER N/A 11/18/2021    Procedure: Bench liver;  Surgeon: Ernesto Schmitt MD;  [facility]     COLONOSCOPY N/A 6/8/2022    Procedure: COLONOSCOPY, WITH BIOPSY;  Surgeon: Leventhal, Thomas Michael, MD;  [place]: [facility] [place]     COMBINED ESOPHAGOSCOPY, GASTROSCOPY, DUODENOSCOPY (EGD), REMOVE BILIARY STENT N/A 2/9/2022    Procedure: ESOPHAGOGASTRODUODENOSCOPY, WITH BILIARY STENT REMOVAL;  Surgeon: Leventhal, Thomas Michael, MD;  Location: [facility] [place]     CV CORONARY ANGIOGRAM N/A 8/25/2021    Procedure: Coronary Angiogram with possible intervention;  Surgeon: David Wilhelm MD;  Location:  HEART [facility]ARD[facility] CATH LAB     ENDOSCOPIC RETROGRADE CHOLANGIOPANCREATOGRAM N/A 6/25/2021    Procedure: ENDOSCOPIC RETROGRADE CHOLANGIOPANCREATOGRAPHY with ballon sweep of bile ducts for stones, balloon dilation of bile ducts and bile duct stent placement;  Surgeon: Gregory Gabriel MD;  Location:  OR     ENDOSCOPIC RETROGRADE CHOLANGIOPANCREATOGRAM N/A 7/22/2021    Procedure: ENDOSCOPIC RETROGRADE CHOLANGIOPANCREATOGRAPHY STONE REMOVAL, DILATION AND STENT PLACEMENT;  Surgeon: Gregory Gabriel MD;  Location:  OR      ENDOSCOPIC RETROGRADE CHOLANGIOPANCREATOGRAPHY, EXCHANGE TUBE/STENT N/A 05/05/2021    Procedure: ENDOSCOPIC RETROGRADE CHOLANGIOPANCREATOGRAPHY WITH STENT EXCHANGE, STONE EXTRACTION, AND DILATION;  Surgeon: Gregory Gabriel MD;  [facility]     ENDOSCOPIC RETROGRADE CHOLANGIOPANCREATOGRAPHY, EXCHANGE TUBE/STENT N/A 5/10/2021    Procedure: ENDOSCOPIC RETROGRADE CHOLANGIOPANCREATOGRAPHY with biliary dilation, stone removal, stent exchange;  Surgeon: Gregory Gabriel MD;  [facility]     ENDOSCOPIC RETROGRADE CHOLANGIOPANCREATOGRAPHY, EXCHANGE TUBE/STENT N/A 6/10/2021    Procedure: ENDOSCOPIC RETROGRADE CHOLANGIOPANCREATOGRAPHY, WITH biliary stent exchange, stone removal;  Surgeon: Woodrow Lott MD;  [facility]     ENDOSCOPIC RETROGRADE CHOLANGIOPANCREATOGRAPHY, EXCHANGE TUBE/STENT N/A 8/30/2021    Procedure: ENDOSCOPIC RETROGRADE CHOLANGIOPANCREATOGRAPHY with biliary dilation, stone removal, stent exchange;  Surgeon: Gregory Gabriel MD;  [facility]     ENDOSCOPIC RETROGRADE CHOLANGIOPANCREATOGRAPHY, EXCHANGE TUBE/STENT N/A 10/1/2021    Procedure: ENDOSCOPIC RETROGRADE CHOLANGIOPANCREATOGRAPHY with balloon sweep of bile ducts, bile duct stent exchanged;  Surgeon: Gregory Gabriel MD;  [facility]     ESOPHAGOSCOPY, GASTROSCOPY, DUODENOSCOPY (EGD), COMBINED N/A 9/4/2021    Procedure: ESOPHAGOGASTRODUODENOSCOPY (EGD);  Surgeon: Leventhal, Thomas Michael, MD;  Location:  GI     ESOPHAGOSCOPY, GASTROSCOPY, DUODENOSCOPY (EGD), COMBINED N/A 10/1/2021    Procedure: ESOPHAGOGASTRODUODENOSCOPY (EGD) with varices banding;  Surgeon: Gregory Gabriel MD;  [facility]     GYN SURGERY      bilat fallopian tubes and ovaries removed     IR LIVER BIOPSY PERCUTANEOUS  5/17/2022     PICC DOUBLE LUMEN PLACEMENT Right 05/08/2021    42cm (2cm external), Lateral brachial vein     RETURN LIVER TRANSPLANT N/A 11/19/2021    Procedure: CLOSURE, WOUND, ABDOMEN, SECONDARY, ABDOMINAL WASHOUT;  Surgeon: Oskar  MD Ernesto;  [facility]     TRANSPLANT LIVER RECIPIENT  DONOR N/A 2021    Procedure: Opening of abdomen, Abdominal Exploration and aborted liver transplant.;  Surgeon: Ernesto Schmitt MD;  [facility]     TRANSPLANT LIVER RECIPIENT  DONOR N/A 2021    Procedure: TRANSPLANT, LIVER, RECIPIENT,  DONOR;  Surgeon: Ernesto Schmitt MD;  [facility]       Past Medical History:   Past Medical History:   Diagnosis Date     Ascites      Biliary cirrhosis (H)      Cholangitis, sclerosing      Cirrhosis of liver with ascites (H) 3/3/2021     Hearing loss of left ear     wears a hearing aide     History of low potassium      Hyperlipidemia      Hypertension      Liver transplant rejection (H) 2022    Mild Rejection     SBP (spontaneous bacterial peritonitis) (H) 2021     Sjogren's syndrome (H)      Ulcerative colitis (H)      Ulcerative pancolitis (H)        Social History:   Social History     Tobacco Use     Smoking status: Never     Smokeless tobacco: Never   Substance Use Topics     Alcohol use: No     Comment: Last drink was        Family History:   Family History   Problem Relation Age of Onset     Cancer Mother         angiosarcoma     Coronary Artery Disease Early Onset Father         MI age 46     Heart Transplant Father      Melanoma Sister      Skin Cancer Sister      Liver Disease No family hx of      Ulcerative Colitis No family hx of      Crohn's Disease No family hx of        Allergies:   Allergies   Allergen Reactions     Diagnostic X-Ray Materials Hives     PATIENT HAD HIVE REACTION AFTER ADMINISTERING CT CONTRAST DYE.       Contrast Dye        Active Medications:   Current Outpatient Medications   Medication Sig Dispense Refill     amLODIPine (NORVASC) 5 MG tablet TAKE 2 TABLETS(10 MG) BY MOUTH DAILY 180 tablet 3     aspirin (ASA) 81 MG EC tablet Take 1 tablet (81 mg) by mouth daily       bisacodyl (DULCOLAX) 5 MG EC tablet Take as  directed. One day before exam take 2 tablets at 3 PM. Take 2 tablets at 11 PM. 4 tablet 0     maribavir (LIVTENCITY) 200 MG tablet Take 2 tablets (400 mg) by mouth 2 times daily 112 tablet 3     multivitamin (CENTRUM SILVER) tablet Take 1 tablet by mouth daily       polyethylene glycol (GOLYTELY) 236 g suspension Take as directed. One day before exam fill the jug with water. Cover and shake until well mixed. At 6 PM start drinking an 8oz glass of mixture every 15 minutes until jug is 1/2 empty. Store remainder in the refrigerator.  At 11 PM Start drinking the other half of the Golytely jug. Drink one 8-ounce glass every 15 minutes until the jug is empty. 4000 mL 0     tacrolimus (GENERIC EQUIVALENT) 1 MG capsule Take 2 capsules (2 mg) by mouth every 12 hours 360 capsule 3     ursodiol (ACTIGALL) 300 MG capsule Take 1 capsule (300 mg) by mouth 2 times daily 180 capsule 3       Systemic Review:   CONSTITUTIONAL: NEGATIVE for fever, chills, change in weight  ENT/MOUTH: NEGATIVE for ear, mouth and throat problems  RESP: NEGATIVE for significant cough or SOB  CV: NEGATIVE for chest pain, palpitations or peripheral edema    Physical Examination:   Vital Signs: /82 (Cuff Size: Adult Regular)   Temp 98.3  F (36.8  C) (Temporal)   Resp 16   LMP  (LMP Unknown)   SpO2 100%   GENERAL: healthy, alert and no distress  NECK: no adenopathy, no asymmetry, masses, or scars  RESP: lungs clear to auscultation - no rales, rhonchi or wheezes  CV: regular rate and rhythm, normal S1 S2, no S3 or S4, no murmur, click or rub, no peripheral edema and peripheral pulses strong  ABDOMEN: soft, nontender, no hepatosplenomegaly, no masses and bowel sounds normal  MS: no gross musculoskeletal defects noted, no edema    Plan: Appropriate to proceed as scheduled.      Kendrick Crawford MD  10/31/2022    PCP:  Tila Nevarez

## 2022-10-31 NOTE — ANESTHESIA PREPROCEDURE EVALUATION
Anesthesia Pre-Procedure Evaluation    Patient: Berta Nava   MRN: [id number] : 1965        Procedure : Procedure(s):  COLONOSCOPY          Past Medical History:   Diagnosis Date     Ascites      Biliary cirrhosis (H)      Cholangitis, sclerosing      Cirrhosis of liver with ascites (H) 3/3/2021     Hearing loss of left ear     wears a hearing aide     History of low potassium      Hyperlipidemia      Hypertension      Liver transplant rejection (H) 2022    Mild Rejection     SBP (spontaneous bacterial peritonitis) (H) 2021     Sjogren's syndrome (H)      Ulcerative colitis (H)      Ulcerative pancolitis (H)       Past Surgical History:   Procedure Laterality Date     BENCH LIVER N/A 2021    Procedure: Bench liver;  Surgeon: Ernesto Schmitt MD;  [facility]     COLONOSCOPY N/A 2022    Procedure: COLONOSCOPY, WITH BIOPSY;  Surgeon: Leventhal, Thomas Michael, MD;  Location: [facility] OR     COMBINED ESOPHAGOSCOPY, GASTROSCOPY, DUODENOSCOPY (EGD), REMOVE BILIARY STENT N/A 2022    Procedure: ESOPHAGOGASTRODUODENOSCOPY, WITH BILIARY STENT REMOVAL;  Surgeon: Leventhal, Thomas Michael, MD;  Location: [facility] OR     CV CORONARY ANGIOGRAM N/A 2021    Procedure: Coronary Angiogram with possible intervention;  Surgeon: David Wilhelm MD;  Location:  HEART CARDIAC CATH LAB     ENDOSCOPIC RETROGRADE CHOLANGIOPANCREATOGRAM N/A 2021    Procedure: ENDOSCOPIC RETROGRADE CHOLANGIOPANCREATOGRAPHY with ballon sweep of bile ducts for stones, balloon dilation of bile ducts and bile duct stent placement;  Surgeon: Gregory Gabriel MD;  Location:  OR     ENDOSCOPIC RETROGRADE CHOLANGIOPANCREATOGRAM N/A 2021    Procedure: ENDOSCOPIC RETROGRADE CHOLANGIOPANCREATOGRAPHY STONE REMOVAL, DILATION AND STENT PLACEMENT;  Surgeon: Gregory Gabriel MD;  Location:  OR     ENDOSCOPIC RETROGRADE CHOLANGIOPANCREATOGRAPHY, EXCHANGE TUBE/STENT N/A 2021    Procedure: ENDOSCOPIC RETROGRADE  CHOLANGIOPANCREATOGRAPHY WITH STENT EXCHANGE, STONE EXTRACTION, AND DILATION;  Surgeon: Gregory Gabriel MD;  [facility]     ENDOSCOPIC RETROGRADE CHOLANGIOPANCREATOGRAPHY, EXCHANGE TUBE/STENT N/A 5/10/2021    Procedure: ENDOSCOPIC RETROGRADE CHOLANGIOPANCREATOGRAPHY with biliary dilation, stone removal, stent exchange;  Surgeon: Gregory Gabriel MD;  [facility]     ENDOSCOPIC RETROGRADE CHOLANGIOPANCREATOGRAPHY, EXCHANGE TUBE/STENT N/A 6/10/2021    Procedure: ENDOSCOPIC RETROGRADE CHOLANGIOPANCREATOGRAPHY, WITH biliary stent exchange, stone removal;  Surgeon: Woodrow Lott MD;  [facility]     ENDOSCOPIC RETROGRADE CHOLANGIOPANCREATOGRAPHY, EXCHANGE TUBE/STENT N/A 2021    Procedure: ENDOSCOPIC RETROGRADE CHOLANGIOPANCREATOGRAPHY with biliary dilation, stone removal, stent exchange;  Surgeon: Gregory Gabriel MD;  [facility]     ENDOSCOPIC RETROGRADE CHOLANGIOPANCREATOGRAPHY, EXCHANGE TUBE/STENT N/A 10/1/2021    Procedure: ENDOSCOPIC RETROGRADE CHOLANGIOPANCREATOGRAPHY with balloon sweep of bile ducts, bile duct stent exchanged;  Surgeon: Gregory Gabriel MD;  [facility]     ESOPHAGOSCOPY, GASTROSCOPY, DUODENOSCOPY (EGD), COMBINED N/A 2021    Procedure: ESOPHAGOGASTRODUODENOSCOPY (EGD);  Surgeon: Leventhal, Thomas Michael, MD;  [facility]     ESOPHAGOSCOPY, GASTROSCOPY, DUODENOSCOPY (EGD), COMBINED N/A 10/1/2021    Procedure: ESOPHAGOGASTRODUODENOSCOPY (EGD) with varices banding;  Surgeon: Gregory Gabriel MD;  [facility]     GYN SURGERY      bilat fallopian tubes and ovaries removed     IR LIVER BIOPSY PERCUTANEOUS  2022     PICC DOUBLE LUMEN PLACEMENT Right 2021    42cm (2cm external), Lateral brachial vein     RETURN LIVER TRANSPLANT N/A 2021    Procedure: CLOSURE, WOUND, ABDOMEN, SECONDARY, ABDOMINAL WASHOUT;  Surgeon: Ernesto Schmitt MD;  [facility]     TRANSPLANT LIVER RECIPIENT  DONOR N/A 2021    Procedure: Opening of abdomen,  Abdominal Exploration and aborted liver transplant.;  Surgeon: Ernesto Schmitt MD;  [facility]     TRANSPLANT LIVER RECIPIENT  DONOR N/A 2021    Procedure: TRANSPLANT, LIVER, RECIPIENT,  DONOR;  Surgeon: Ernesto Schmitt MD;  [facility]      Allergies   Allergen Reactions     Diagnostic X-Ray Materials Hives     PATIENT HAD HIVE REACTION AFTER ADMINISTERING CT CONTRAST DYE.       Contrast Dye       Social History     Tobacco Use     Smoking status: Never     Smokeless tobacco: Never   Substance Use Topics     Alcohol use: No     Comment: Last drink was       Wt Readings from Last 1 Encounters:   22 75.3 kg (165 lb 14.4 oz)        Anesthesia Evaluation   Pt has had prior anesthetic. Type: General and MAC.        ROS/MED HX  ENT/Pulmonary:  - neg pulmonary ROS     Neurologic:  - neg neurologic ROS     Cardiovascular:       METS/Exercise Tolerance:     Hematologic:     (+) anemia, history of blood transfusion, no previous transfusion reaction, Known PRBC Anitbodies:No     Musculoskeletal:   (+) arthritis,     GI/Hepatic:       Renal/Genitourinary:       Endo:     (+) Chronic steroid usage for Post Transplant Immunosuppression.     Psychiatric/Substance Use:     (+) alcohol abuse     Infectious Disease:  - neg infectious disease ROS     Malignancy:       Other:            Physical Exam    Airway  airway exam normal           Respiratory Devices and Support         Dental  no notable dental history         Cardiovascular   cardiovascular exam normal          Pulmonary   pulmonary exam normal                OUTSIDE LABS:  CBC:   Lab Results   Component Value Date    WBC 4.4 10/12/2022    WBC 4.5 2022    HGB 10.4 (L) 10/12/2022    HGB 11.7 2022    HCT 29.9 (L) 10/12/2022    HCT 33.6 (L) 2022     10/12/2022     2022     BMP:   Lab Results   Component Value Date     10/05/2022     2022    POTASSIUM 4.3 10/05/2022     POTASSIUM 3.8 09/07/2022    CHLORIDE 113 (H) 10/05/2022    CHLORIDE 112 (H) 09/07/2022    CO2 21 10/05/2022    CO2 22 09/07/2022    BUN 22 10/05/2022    BUN 21 09/07/2022    CR 0.81 10/05/2022    CR 0.83 09/07/2022     (H) 10/05/2022    GLC 93 09/07/2022     COAGS:   Lab Results   Component Value Date    PTT 36 12/03/2021    INR 1.09 05/17/2022    FIBR 465 11/22/2021     POC:   Lab Results   Component Value Date    BGM 95 06/25/2021     HEPATIC:   Lab Results   Component Value Date    ALBUMIN 3.7 10/05/2022    PROTTOTAL 7.7 10/05/2022    ALT 17 10/05/2022    AST 18 10/05/2022    ALKPHOS 66 10/05/2022    BILITOTAL 0.6 10/05/2022    MIHIR 26 11/19/2021     OTHER:   Lab Results   Component Value Date    PH 7.48 (H) 11/20/2021    LACT 0.6 (L) 12/03/2021    A1C 5.4 11/19/2021    MARIELENA 9.4 10/05/2022    PHOS 4.4 10/05/2022    MAG 1.8 08/10/2022    LIPASE 155 12/03/2021    AMYLASE 36 11/19/2021    CRP 54.0 (H) 11/17/2021       Anesthesia Plan    ASA Status:  3   NPO Status:  NPO Appropriate    Anesthesia Type: MAC.     - Reason for MAC: immobility needed   Induction: Intravenous.   Maintenance: TIVA.        Consents    Anesthesia Plan(s) and associated risks, benefits, and realistic alternatives discussed. Questions answered and patient/representative(s) expressed understanding.    - Discussed:     - Discussed with:  Patient      - Extended Intubation/Ventilatory Support Discussed: No.      - Patient is DNR/DNI Status: No    Use of blood products discussed: No .     Postoperative Care       PONV prophylaxis: Background Propofol Infusion     Comments:           H&P reviewed: Unable to attach H&P to encounter due to EHR limitations. H&P Update: appropriate H&P reviewed, patient examined. No interval changes since H&P (within 30 days).         Ruslan Carlos MD

## 2022-10-31 NOTE — ADDENDUM NOTE
Addendum  created 10/31/22 1313 by Ruslan Carlos MD    Attestation recorded in Intraprocedure, Intraprocedure Attestations filed

## 2022-10-31 NOTE — ANESTHESIA CARE TRANSFER NOTE
Patient: Berta Nava    Procedure: Procedure(s):  COLONOSCOPY, WITH POLYPECTOMY AND BIOPSY       Diagnosis: Colitis [K52.9]  Diagnosis Additional Information: No value filed.    Anesthesia Type:   MAC     Note:    Oropharynx: oropharynx clear of all foreign objects and spontaneously breathing  Level of Consciousness: awake  Oxygen Supplementation: room air    Independent Airway: airway patency satisfactory and stable  Dentition: dentition unchanged  Vital Signs Stable: post-procedure vital signs reviewed and stable  Report to RN Given: handoff report given  Patient transferred to: Phase II    Handoff Report: Identifed the Patient, Identified the Reponsible Provider, Reviewed the pertinent medical history, Discussed the surgical course, Reviewed Intra-OP anesthesia mangement and issues during anesthesia, Set expectations for post-procedure period and Allowed opportunity for questions and acknowledgement of understanding      Vitals:  Vitals Value Taken Time   /41 10/31/22 1259   Temp 36.2  C (97.2  F) 10/31/22 1259   Pulse 91    Resp 16 10/31/22 1259   SpO2 100 % 10/31/22 1259       Electronically Signed By: CHELSEY Reynolds CRNA  October 31, 2022  1:02 PM

## 2022-10-31 NOTE — ANESTHESIA POSTPROCEDURE EVALUATION
Patient: Berta Nava    Procedure: Procedure(s):  COLONOSCOPY, WITH POLYPECTOMY AND BIOPSY       Anesthesia Type:  MAC    Note:  Disposition: Outpatient   Postop Pain Control: Uneventful            Sign Out: Well controlled pain   PONV: No   Neuro/Psych: Uneventful            Sign Out: Acceptable/Baseline neuro status   Airway/Respiratory: Uneventful            Sign Out: Acceptable/Baseline resp. status   CV/Hemodynamics: Uneventful            Sign Out: Acceptable CV status; No obvious hypovolemia; No obvious fluid overload   Other NRE: NONE   DID A NON-ROUTINE EVENT OCCUR? No           Last vitals:  Vitals Value Taken Time   /41 10/31/22 1259   Temp 36.2  C (97.2  F) 10/31/22 1259   Pulse     Resp 16 10/31/22 1259   SpO2 100 % 10/31/22 1259       Electronically Signed By: Ruslan Carlos MD  October 31, 2022  1:12 PM

## 2022-11-01 PROCEDURE — 88305 TISSUE EXAM BY PATHOLOGIST: CPT | Mod: 26 | Performed by: PATHOLOGY

## 2022-11-01 ASSESSMENT — ENCOUNTER SYMPTOMS
JAUNDICE: 0
SPEECH CHANGE: 0
BOWEL INCONTINENCE: 0
CHILLS: 0
FATIGUE: 1
PARALYSIS: 0
DECREASED APPETITE: 0
BLOATING: 1
ALTERED TEMPERATURE REGULATION: 0
DIARRHEA: 0
SEIZURES: 0
NAUSEA: 0
WEAKNESS: 0
BACK PAIN: 0
HEADACHES: 0
VOMITING: 0
TINGLING: 1
HEARTBURN: 0
RECTAL PAIN: 0
ARTHRALGIAS: 1
MUSCLE CRAMPS: 0
INCREASED ENERGY: 0
MUSCLE WEAKNESS: 1
JOINT SWELLING: 1
STIFFNESS: 1
FEVER: 0
NUMBNESS: 1
BLOOD IN STOOL: 0
POLYPHAGIA: 0
TREMORS: 1
ABDOMINAL PAIN: 0
LOSS OF CONSCIOUSNESS: 0
MYALGIAS: 1
POLYDIPSIA: 0
NIGHT SWEATS: 0
WEIGHT GAIN: 1
WEIGHT LOSS: 0
NECK PAIN: 1
DIZZINESS: 0
HALLUCINATIONS: 0
DISTURBANCES IN COORDINATION: 0
CONSTIPATION: 1
MEMORY LOSS: 0

## 2022-11-02 LAB
PATH REPORT.ADDENDUM SPEC: NORMAL
PATH REPORT.COMMENTS IMP SPEC: NORMAL
PATH REPORT.FINAL DX SPEC: NORMAL
PATH REPORT.GROSS SPEC: NORMAL
PATH REPORT.MICROSCOPIC SPEC OTHER STN: NORMAL
PATH REPORT.RELEVANT HX SPEC: NORMAL
PHOTO IMAGE: NORMAL

## 2022-11-02 PROCEDURE — 88342 IMHCHEM/IMCYTCHM 1ST ANTB: CPT | Mod: 26 | Performed by: PATHOLOGY

## 2022-11-03 ENCOUNTER — PRE VISIT (OUTPATIENT)
Dept: GASTROENTEROLOGY | Facility: CLINIC | Age: 57
End: 2022-11-03

## 2022-11-03 ENCOUNTER — OFFICE VISIT (OUTPATIENT)
Dept: GASTROENTEROLOGY | Facility: CLINIC | Age: 57
End: 2022-11-03
Payer: COMMERCIAL

## 2022-11-03 VITALS
DIASTOLIC BLOOD PRESSURE: 75 MMHG | HEIGHT: 66 IN | HEART RATE: 102 BPM | OXYGEN SATURATION: 100 % | WEIGHT: 164.7 LBS | SYSTOLIC BLOOD PRESSURE: 115 MMHG | BODY MASS INDEX: 26.47 KG/M2

## 2022-11-03 DIAGNOSIS — K51.019 ULCERATIVE PANCOLITIS WITH COMPLICATION (H): Primary | ICD-10-CM

## 2022-11-03 DIAGNOSIS — Z94.4 S/P LIVER TRANSPLANT (H): ICD-10-CM

## 2022-11-03 DIAGNOSIS — K52.9 COLITIS: ICD-10-CM

## 2022-11-03 DIAGNOSIS — K83.01 PSC (PRIMARY SCLEROSING CHOLANGITIS) (H): ICD-10-CM

## 2022-11-03 PROCEDURE — 99215 OFFICE O/P EST HI 40 MIN: CPT | Mod: GC | Performed by: INTERNAL MEDICINE

## 2022-11-03 ASSESSMENT — PAIN SCALES - GENERAL: PAINLEVEL: NO PAIN (0)

## 2022-11-03 NOTE — PROGRESS NOTES
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   #Ulcerative Colitis  #PSC s/p liver transplant  #CMV  #Constipation  #Bloating  Most recent colonoscopy in 10/31/22 shows no active UC. Only one specimen of the rectum showed inactive chronic disease. She continues to have constipation on 1 cap of miralax daily. She can move to up to 3 caps per day. No active UC symptoms. No extracolonic manifestations currently. No mesalamine for now give inactive disease. She has been off it for 1-2 years w/o flares or recurrence. She also wants to avoid medications unless needed for symptoms. Given her UC with PSC she at higher risk of CRC, for which she should continue yearly colonoscopies.    RTC 1 year with colonoscopy    Patient was seen and discussed with Dr Howard German Velez Reyes, MD, PhD  Internal Medicine PGY1        HPI  Initial HPI: Ms. Nava is a 56 year old female who is presenting today for initial visit at [facility] clinic for history of UC diagnosis and recent colonoscopy revealing ulcer with +CMV and mild chronic inflammation in colon from sigmoid to cecum. She has been followed closely by Dr. 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 She was initially diagnosed with  PBC, however diagnosis changed to PSC several years later after biopsy. Ms. Nava reports that she was diagnosed with UC 10-20 years ago at [facility]. She reports having loose stools and urgency at time of diagnosis, however no blood in stool, tenesmus, or incontinence. She reports that GI symptoms improved and she was not started on treatment for UC until 2019 when she had several days of blood in stool.  Interestingly colonoscopy at  the time (5/2022) showed erythematous mucosa in the descending colon, in the transverse colon, at the hepatic flexure, in the ascending colon and in the cecum however biopsies showed normal mucosa. She started mesalamine (Lialda), symptoms improved. This was subsequently stopped 9/2021 presumably from improved symptomatology and lack of endoscopic inflammation (no adverse effects from med per pt) and reports has not had blood or significant loose stool since that time. Currently she reports overall feeling well. Well BMs are formed, but somewhat hard and feels constipated. She is currently having BMs every 1-2 days (Payne 1-3). Some straining. Taking 1 cap Miralax with coffee~2x/wk, when feeling more symptomatic, she reports this helps. She started Magnesium oxide supplementation for low Mg level, however does not feel like this impacted stool. Occasional RLQ twinge, but denies overt abdominal pain, denies N/V. No fevers or chills. Recently having more dry mouth/dry eye. Saw Rheumatology 10 yrs ago and reports was diagnosed with possible Sjogren's.    Interval History:  Today she feels well. She denies any major issus ans has been doing well since colonoscopy. She feels constipated and is having 2-3 BMs week. Se has been taking 1 cap of miralax daily with minimal improvement, although her oral intake still can increased. Otherwise she has been stable and has been following with Dr Rios and is scheduled to follow with Rheumatology for joint aches and proximal shoulder weakness/pain.     IBD HISTORY  Age at diagnosis: 10-20 years ago  Extent of disease:  Current UC medications none  Prior UC surgeries: none  Prior IBD Medications: Mesalamine (stopped 2021)    Extra intestinal manifestations of IBD:  No uveitis/episcleritis  No aphthous ulcers   No arthritis   No erythema nodosum/pyoderma gangrenosum.     ROS:    No fevers or chills  No weight loss  No blurry vision, double vision or change in vision  No sore  throat  No lymphadenopathy  No headache, paraesthesias, or weakness in a limb  No shortness of breath or wheezing  No chest pain or pressure  No arthralgias or myalgias  No rashes or skin changes  No odynophagia or dysphagia  No BRBPR, hematochezia, melena  No dysuria, frequency or urgency  No hot/cold intolerance or polyria  No anxiety or depression    PROBLEM LIST    Patient Active Problem List    Diagnosis Date Noted     Liver transplant rejection (H) 05/17/2022     Priority: Medium     Mild Rejection       Anemia due to chronic kidney disease 11/30/2021     Priority: Medium     Immunosuppressed status (H) 11/29/2021     Priority: Medium     S/P liver transplant (H) 11/29/2021     Priority: Medium     Acute kidney failure, unspecified (H) 09/02/2021     Priority: Medium     Liver transplant planned 08/18/2021     Priority: Medium     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     Priority: Medium     Added automatically from request for surgery [id number]       Secondary esophageal varices without bleeding (H) 06/21/2021     Priority: Medium     PSC (primary sclerosing cholangitis) 06/12/2019     Priority: Medium     Ulcerative pancolitis with complication (H) 06/12/2019     Priority: Medium     Cholangitis 05/13/2018     Priority: Medium     Hypokalemia 05/13/2018     Priority: Medium     Sicca syndrome (H) 12/11/2017     Priority: Medium     Hyperlipidemia 02/23/2012     Priority: Medium     Mixed hearing loss, bilateral 01/07/2009     Priority: Medium     Neoplasm of uncertain behavior of skin 01/26/2007     Priority: Medium     Overview:   LW Modifier:  moderate DN- left buttock  ; Dysplastic Nevus  Overview:   LW Modifier:  recurrent- right upper back  ; Dysplastic Nevus       Essential hypertension 06/07/2003     Priority: Medium     Overview:   Hypertension         PERTINENT PAST MEDICAL HISTORY:    Past Medical History:   Diagnosis Date     Ascites      Biliary cirrhosis (H)      Cholangitis, sclerosing       Cirrhosis of liver with ascites (H) 3/3/2021     Hearing loss of left ear     wears a hearing aide     History of low potassium      Hyperlipidemia      Hypertension      Liver transplant rejection (H) 5/17/2022    Mild Rejection     SBP (spontaneous bacterial peritonitis) (H) 4/30/2021     Sjogren's syndrome (H)      Ulcerative colitis (H)      Ulcerative pancolitis (H)        PREVIOUS SURGERIES:    Past Surgical History:   Procedure Laterality Date     BENCH LIVER N/A 11/18/2021    Procedure: Bench liver;  Surgeon: Ernesto Schmitt MD;  Location: [facility] OR     COLONOSCOPY N/A 6/8/2022    Procedure: COLONOSCOPY, WITH BIOPSY;  Surgeon: Leventhal, Thomas Michael, MD;  Location: [facility] OR     COLONOSCOPY N/A 10/31/2022    Procedure: COLONOSCOPY, WITH POLYPECTOMY AND BIOPSY;  Surgeon: Kendrick Crawford MD;  Location: [facility] OR     COMBINED ESOPHAGOSCOPY, GASTROSCOPY, DUODENOSCOPY (EGD), REMOVE BILIARY STENT N/A 2/9/2022    Procedure: ESOPHAGOGASTRODUODENOSCOPY, WITH BILIARY STENT REMOVAL;  Surgeon: Leventhal, Thomas Michael, MD;  Location: [facility] OR     CV CORONARY ANGIOGRAM N/A 8/25/2021    Procedure: Coronary Angiogram with possible intervention;  Surgeon: David Wilhelm MD;  Location:  [facility]ATH [facility]     ENDOSCOPIC RETROGRADE CHOLANGIOPANCREATOGRAM N/A 6/25/2021    Procedure: ENDOSCOPIC RETROGRADE CHOLANGIOPANCREATOGRAPHY with ballon sweep of bile ducts for stones, balloon dilation of bile ducts and bile duct stent placement;  Surgeon: Gregory Gabriel MD;  Location:  OR     ENDOSCOPIC RETROGRADE CHOLANGIOPANCREATOGRAM N/A 7/22/2021    Procedure: ENDOSCOPIC RETROGRADE CHOLANGIOPANCREATOGRAPHY STONE REMOVAL, DILATION AND STENT PLACEMENT;  Surgeon: Gregory Gabriel MD;  Location:  OR     ENDOSCOPIC RETROGRADE CHOLANGIOPANCREATOGRAPHY, EXCHANGE TUBE/STENT N/A 05/05/2021    Procedure: ENDOSCOPIC RETROGRADE CHOLANGIOPANCREATOGRAPHY WITH STENT EXCHANGE, STONE EXTRACTION, AND DILATION;  Surgeon: Harvey  MD Gregory;  Location: [facility] OR     ENDOSCOPIC RETROGRADE CHOLANGIOPANCREATOGRAPHY, EXCHANGE TUBE/STENT N/A 5/10/2021    Procedure: ENDOSCOPIC RETROGRADE CHOLANGIOPANCREATOGRAPHY with biliary dilation, stone removal, stent exchange;  Surgeon: Gregory Gabriel MD;  Location: [facility] OR     ENDOSCOPIC RETROGRADE CHOLANGIOPANCREATOGRAPHY, EXCHANGE TUBE/STENT N/A 6/10/2021    Procedure: ENDOSCOPIC RETROGRADE CHOLANGIOPANCREATOGRAPHY, WITH biliary stent exchange, stone removal;  Surgeon: Woodrow Lott MD;  Location: [facility] OR     ENDOSCOPIC RETROGRADE CHOLANGIOPANCREATOGRAPHY, EXCHANGE TUBE/STENT N/A 2021    Procedure: ENDOSCOPIC RETROGRADE CHOLANGIOPANCREATOGRAPHY with biliary dilation, stone removal, stent exchange;  Surgeon: Gregory Gabriel MD;  Location: [facility] OR     ENDOSCOPIC RETROGRADE CHOLANGIOPANCREATOGRAPHY, EXCHANGE TUBE/STENT N/A 10/1/2021    Procedure: ENDOSCOPIC RETROGRADE CHOLANGIOPANCREATOGRAPHY with balloon sweep of bile ducts, bile duct stent exchanged;  Surgeon: Gregory Gabriel MD;  Location: [facility] OR     ESOPHAGOSCOPY, GASTROSCOPY, DUODENOSCOPY (EGD), COMBINED N/A 2021    Procedure: ESOPHAGOGASTRODUODENOSCOPY (EGD);  Surgeon: Leventhal, Thomas Michael, MD;  Location: [facility] GI     ESOPHAGOSCOPY, GASTROSCOPY, DUODENOSCOPY (EGD), COMBINED N/A 10/1/2021    Procedure: ESOPHAGOGASTRODUODENOSCOPY (EGD) with varices banding;  Surgeon: Gregory Gabriel MD;  Location: [facility] OR     GYN SURGERY      bilat fallopian tubes and ovaries removed     IR LIVER BIOPSY PERCUTANEOUS  2022     PICC DOUBLE LUMEN PLACEMENT Right 2021    42cm (2cm external), Lateral brachial vein     RETURN LIVER TRANSPLANT N/A 2021    Procedure: CLOSURE, WOUND, ABDOMEN, SECONDARY, ABDOMINAL WASHOUT;  Surgeon: Ernesto Schmitt MD;  Location: [facility] OR     TRANSPLANT LIVER RECIPIENT  DONOR N/A 2021    Procedure: Opening of abdomen, Abdominal Exploration and aborted liver transplant.;  Surgeon: Ernesto Schmitt MD;   Location: [facility] OR     TRANSPLANT LIVER RECIPIENT  DONOR N/A 2021    Procedure: TRANSPLANT, LIVER, RECIPIENT,  DONOR;  Surgeon: Ernesto Schmitt MD;  Location: [facility] OR       PREVIOUS ENDOSCOPY:  Colonoscopy 10/31/22  Impression:    - Preparation of the colon was fair.                          - The examined portion of the ileum was normal.                          - One 6 mm polyp in the cecum, removed with a cold                          snare. Resected and retrieved. Clipped x 1.                          - One 2 mm polyp in the ascending colon, removed with                          a cold biopsy forceps. Resected and retrieved.                          - The examination was otherwise normal on direct and                          retroflexion views.                          - Biopsies for surveillance were taken from the entire                          colon and CMV staining.                          No evidence of active inflammation here. If she does                          have PSC-associated IBD her Manzano score would be 0.    A.  COLON, ASCENDING, POLYP:  - Colonic mucosa with benign lymphoid aggregates  - No dysplasia or malignancy identified    B.  CECUM, POLYP:  - Colonic mucosa with benign lymphoid aggregates  - No dysplasia or malignancy identified    C.  COLON, CECUM/ASCENDING ASCENDING, BIOPSY:  - Unremarkable colonic mucosa  - No dysplasia or malignancy identified    D.  COLON, TRANSVERSE, BIOPSY:  - Unremarkable colonic mucosa  - No dysplasia or malignancy identified    E.  COLON, DESCENDING/SIGMOID, BIOPSY:  - Unremarkable colonic mucosa  - No dysplasia or malignancy identified    F.  RECTUM, BIOPSY:  - Mild chronic inactive proctitis (Re grade 1); see comment  - No dysplasia or malignancy identified    Colonoscopy 2022  Impression:            - A single (solitary) ulcer in the cecum. Biopsied.    - Ulcerative colitis. Inflammation was found from the sigmoid colon to the  cecum. This was graded as Manzano Score 1 (mild disease). Biopsied.   - The distal rectum and anal verge are normal on retroflexion view.   - The examined portion of the ileum was normal.   A.  CECAL ULCER, BIOPSY :  - Colonic mucosa with ulceration and necroinflammatory debris  - POSITIVE for CMV by immunohistochemistry  - Negative for HSV by immunohistochemistry    B.  CECUM AND ASCENDING COLON, BIOPSIES :  - Colonic mucosa with mild crypt distortion and mild chronic inflammation (Re grade 1)  - Negative for dysplasia      C. HEPATIC FLEXURE AND TRANSVERSE COLON, BIOPSIES :  - Colonic mucosa with mild crypt distortion no significant inflammation (Re grade 0)  - Negative for dysplasia      D. DISTAL AND TRANSVERSE COLON, BIOPSIES :  - Colonic mucosa with no significant inflammation (Re grade 0)  - Negative for dysplasia     E. DESCENDING COLON, BIOPSIES :  - Colonic mucosa with mild crypt distortion and mild patchy chronic inflammation (Re grade 1)  - Negative for dysplasia      F. RECTAL BIOPSIES :  - Colonic mucosa with no significant inflammation (Re grade 0)  - Negative for dysplasia     Colonoscopy 12/23/2020 ([facility])  Impression:   - The examined portion of the ileum was normal.   - Erythematous mucosa in the descending colon, in the transverse colon, at the hepatic flexure, in the ascending colon and in the cecum.  Biopsied.     - Normal mucosa in the rectum. Biopsied.    - Internal hemorrhoids.   A.  Colon, cecum, asc, biopsy:    Benign colonic mucosa, without active colitis or epithelial dysplasia     B.  Colon, transverse, biopsy:    Benign colonic mucosa, without active colitis or epithelial dysplasia     C.  Colon, descending, sig, biopsy:    Benign colonic mucosa, without active colitis or epithelial dysplasia     D.  Colon, rectum, biopsy:    Benign colonic mucosa, without active colitis or epithelial dysplasia      11/19/2014 ([facility])  Impression:            - The entire  examined colon is  normal. Biopsied due to prior history of microscopic colitis.      - The examination was otherwise  normal.   Colon, random endoscopic biopsies:      1. Rare foci of focal active colitis (see comment).      2. No epithelial dysplasia identified.       ALLERGIES:     Allergies   Allergen Reactions     Diagnostic X-Ray Materials Hives     PATIENT HAD HIVE REACTION AFTER ADMINISTERING CT CONTRAST DYE.       Contrast Dye        PERTINENT MEDICATIONS:    Current Outpatient Medications:      amLODIPine (NORVASC) 5 MG tablet, TAKE 2 TABLETS(10 MG) BY MOUTH DAILY, Disp: 180 tablet, Rfl: 3     aspirin (ASA) 81 MG EC tablet, Take 1 tablet (81 mg) by mouth daily, Disp: , Rfl:      bisacodyl (DULCOLAX) 5 MG EC tablet, Take as directed. One day before exam take 2 tablets at 3 PM. Take 2 tablets at 11 PM., Disp: 4 tablet, Rfl: 0     maribavir (LIVTENCITY) 200 MG tablet, Take 2 tablets (400 mg) by mouth 2 times daily, Disp: 112 tablet, Rfl: 3     multivitamin (CENTRUM SILVER) tablet, Take 1 tablet by mouth daily, Disp: , Rfl:      polyethylene glycol (GOLYTELY) 236 g suspension, Take as directed. One day before exam fill the jug with water. Cover and shake until well mixed. At 6 PM start drinking an 8oz glass of mixture every 15 minutes until jug is 1/2 empty. Store remainder in the refrigerator.  At 11 PM Start drinking the other half of the Golytely jug. Drink one 8-ounce glass every 15 minutes until the jug is empty., Disp: 4000 mL, Rfl: 0     tacrolimus (GENERIC EQUIVALENT) 1 MG capsule, Take 2 capsules (2 mg) by mouth every 12 hours, Disp: 360 capsule, Rfl: 3     ursodiol (ACTIGALL) 300 MG capsule, Take 1 capsule (300 mg) by mouth 2 times daily, Disp: 180 capsule, Rfl: 3   No other NSAID/anticoagulation reported by patient.   No other OTC/herbal/supplements reported by patient.    SOCIAL HISTORY:  Tobacco: Denies  Alcohol: Denies  Drugs Use: Denies  Work: working from home, IT . Would like  to be able to get out more.      Social History     Socioeconomic History     Marital status:      Spouse name: Not on file     Number of children: Not on file     Years of education: Not on file     Highest education level: Not on file   Occupational History     Not on file   Tobacco Use     Smoking status: Never     Smokeless tobacco: Never   Vaping Use     Vaping Use: Never used   Substance and Sexual Activity     Alcohol use: No     Comment: Last drink was 2017     Drug use: No     Sexual activity: Not Currently     Partners: Male   Other Topics Concern     Parent/sibling w/ CABG, MI or angioplasty before 65F 55M? Not Asked   Social History Narrative     Not on file     Social Determinants of Health     Financial Resource Strain: Not on file   Food Insecurity: Not on file   Transportation Needs: Not on file   Physical Activity: Not on file   Stress: Not on file   Social Connections: Not on file   Intimate Partner Violence: Not on file   Housing Stability: Not on file       FAMILY HISTORY:  FH of CRC: No  FH of IBD: No - Father had diarrhea she thinks might have been UC, not diagnosed  No Colon/Panc/Esophageal/other GI CA. No IBD or Celiac Disease. No other Autoimmune, Liver, or Thyroid disease.    Family History   Problem Relation Age of Onset     Cancer Mother         angiosarcoma     Coronary Artery Disease Early Onset Father         MI age 46     Heart Transplant Father      Melanoma Sister      Skin Cancer Sister      Liver Disease No family hx of      Ulcerative Colitis No family hx of      Crohn's Disease No family hx of        Past/family/social history reviewed and no changes    PHYSICAL EXAMINATION:  Constitutional: AAOx3, cooperative, pleasant, not dyspneic/diaphoretic, no acute distress  Vitals reviewed: LMP  (LMP Unknown)   Wt:   Wt Readings from Last 2 Encounters:   07/14/22 75.3 kg (165 lb 14.4 oz)   06/14/22 71.7 kg (158 lb)      Eyes: Sclera anicteric/injected  Ears/nose/mouth/throat:  Normal oropharynx without ulcers or exudate, mucus membranes moist, hearing intact  Neck: supple, thyroid normal size  CV: No edema  Respiratory: Unlabored breathing  Lymph: No axillary, submandibular, supraclavicular or inguinal lymphadenopathy  Abd:  Nondistended, +bs, no hepatosplenomegaly, nontender, no peritoneal signs  Skin: warm, perfused, no jaundice  Psych: Normal affect  MSK: Normal gait      PERTINENT STUDIES:    Lab on 07/13/2022   Component Date Value Ref Range Status     WBC Count 07/13/2022 2.3 (A) 4.0 - 11.0 10e3/uL Final     RBC Count 07/13/2022 4.29  3.80 - 5.20 10e6/uL Final     Hemoglobin 07/13/2022 12.9  11.7 - 15.7 g/dL Final     Hematocrit 07/13/2022 38.9  35.0 - 47.0 % Final     MCV 07/13/2022 91  78 - 100 fL Final     MCH 07/13/2022 30.1  26.5 - 33.0 pg Final     MCHC 07/13/2022 33.2  31.5 - 36.5 g/dL Final     RDW 07/13/2022 17.9 (A) 10.0 - 15.0 % Final     Platelet Count 07/13/2022 218  150 - 450 10e3/uL Final     % Neutrophils 07/13/2022 59  % Final     % Lymphocytes 07/13/2022 38  % Final     % Monocytes 07/13/2022 2  % Final     % Eosinophils 07/13/2022 0  % Final     % Basophils 07/13/2022 1  % Final     NRBCs per 100 WBC 07/13/2022 1 (A) <=0 % Final     Absolute Neutrophils 07/13/2022 1.4 (A) 1.6 - 8.3 10e3/uL Final     Absolute Lymphocytes 07/13/2022 0.9  0.8 - 5.3 10e3/uL Final     Absolute Monocytes 07/13/2022 0.0  0.0 - 1.3 10e3/uL Final     Absolute Eosinophils 07/13/2022 0.0  0.0 - 0.7 10e3/uL Final     Absolute Basophils 07/13/2022 0.0  0.0 - 0.2 10e3/uL Final     Absolute NRBCs 07/13/2022 0.0  <=0.0 10e3/uL Final     RBC Morphology 07/13/2022 Confirmed RBC Indices   Final     Platelet Assessment 07/13/2022 Automated Count Confirmed. Platelet morphology is normal.  Automated Count Confirmed. Platelet morphology is normal. Final       Answers for HPI/ROS submitted by the patient on 11/1/2022  General Symptoms: Yes  Skin Symptoms: No  HENT Symptoms: No  EYE SYMPTOMS: No  HEART  SYMPTOMS: No  LUNG SYMPTOMS: No  INTESTINAL SYMPTOMS: Yes  URINARY SYMPTOMS: No  GYNECOLOGIC SYMPTOMS: No  BREAST SYMPTOMS: No  SKELETAL SYMPTOMS: Yes  BLOOD SYMPTOMS: No  NERVOUS SYSTEM SYMPTOMS: Yes  MENTAL HEALTH SYMPTOMS: No  Fever: No  Loss of appetite: No  Weight loss: No  Weight gain: Yes  Fatigue: Yes  Night sweats: No  Chills: No  Increased stress: No  Excessive hunger: No  Excessive thirst: No  Feeling hot or cold when others believe the temperature is normal: No  Loss of height: No  Post-operative complications: No  Surgical site pain: No  Hallucinations: No  Change in or Loss of Energy: No  Hyperactivity: No  Confusion: No  Heart burn or indigestion: No  Nausea: No  Vomiting: No  Abdominal pain: No  Bloating: Yes  Constipation: Yes  Diarrhea: No  Blood in stool: No  Black stools: No  Rectal or Anal pain: No  Fecal incontinence: No  Yellowing of skin or eyes: No  Vomit with blood: No  Change in stools: No  Back pain: No  Muscle aches: Yes  Neck pain: Yes  Swollen joints: Yes  Joint pain: Yes  Bone pain: Yes  Muscle cramps: No  Muscle weakness: Yes  Joint stiffness: Yes  Bone fracture: No  Trouble with coordination: No  Dizziness or trouble with balance: No  Fainting or black-out spells: No  Memory loss: No  Headache: No  Seizures: No  Speech problems: No  Tingling: Yes  Tremor: Yes  Weakness: No  Difficulty walking: No  Paralysis: No  Numbness: Yes

## 2022-11-03 NOTE — LETTER
11/3/2022         RE: Berta Nava  [id number] / [facility] [id number]        Dear Colleague,    Thank you for referring your patient, Berta Nava, to the [facility] [place]. Please see a copy of my visit note below.    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   #Ulcerative Colitis  #PSC s/p liver transplant  #CMV  #Constipation  #Bloating  Most recent colonoscopy in 10/31/22 shows no active UC. Only one specimen of the rectum showed inactive chronic disease. She continues to have constipation on 1 cap of miralax daily. She can move to up to 3 caps per day. No active UC symptoms. No extracolonic manifestations currently. No mesalamine for now give inactive disease. She has been off it for 1-2 years w/o flares or recurrence. She also wants to avoid medications unless needed for symptoms. Given her UC with PSC she at higher risk of CRC, for which she should continue yearly colonoscopies.    RTC 1 year with colonoscopy    Patient was seen and discussed with Dr Howard German Velez Reyes, MD, PhD  Internal Medicine PGY1        HPI  Initial HPI: Ms. Nava is a 56 year old female who is presenting today for initial visit at [facility] clinic for history of UC diagnosis and recent colonoscopy revealing ulcer with +CMV and mild chronic inflammation in colon from sigmoid to cecum. She has been followed closely by Dr. 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 She was initially diagnosed with  PBC, however diagnosis changed to PSC several years later after biopsy. Ms. Nava reports that she was diagnosed with UC 10-20 years ago at [facility]. She reports  having loose stools and urgency at time of diagnosis, however no blood in stool, tenesmus, or incontinence. She reports that GI symptoms improved and she was not started on treatment for UC until 2019 when she had several days of blood in stool.  Interestingly colonoscopy at the time (5/2022) showed erythematous mucosa in the descending colon, in the transverse colon, at the hepatic flexure, in the ascending colon and in the cecum however biopsies showed normal mucosa. She started mesalamine (Lialda), symptoms improved. This was subsequently stopped 9/2021 presumably from improved symptomatology and lack of endoscopic inflammation (no adverse effects from med per pt) and reports has not had blood or significant loose stool since that time. Currently she reports overall feeling well. Well BMs are formed, but somewhat hard and feels constipated. She is currently having BMs every 1-2 days ([place] 1-3). Some straining. Taking 1 cap Miralax with coffee~2x/wk, when feeling more symptomatic, she reports this helps. She started Magnesium oxide supplementation for low Mg level, however does not feel like this impacted stool. Occasional RLQ twinge, but denies overt abdominal pain, denies N/V. No fevers or chills. Recently having more dry mouth/dry eye. Saw Rheumatology 10 yrs ago and reports was diagnosed with possible Sjogren's.    Interval History:  Today she feels well. She denies any major issus ans has been doing well since colonoscopy. She feels constipated and is having 2-3 BMs week. Se has been taking 1 cap of miralax daily with minimal improvement, although her oral intake still can increased. Otherwise she has been stable and has been following with Dr Rios and is scheduled to follow with Rheumatology for joint aches and proximal shoulder weakness/pain.     IBD HISTORY  Age at diagnosis: 10-20 years ago  Extent of disease:  Current UC medications none  Prior UC surgeries: none  Prior IBD Medications: Mesalamine  (stopped 2021)    Extra intestinal manifestations of IBD:  No uveitis/episcleritis  No aphthous ulcers   No arthritis   No erythema nodosum/pyoderma gangrenosum.     ROS:    No fevers or chills  No weight loss  No blurry vision, double vision or change in vision  No sore throat  No lymphadenopathy  No headache, paraesthesias, or weakness in a limb  No shortness of breath or wheezing  No chest pain or pressure  No arthralgias or myalgias  No rashes or skin changes  No odynophagia or dysphagia  No BRBPR, hematochezia, melena  No dysuria, frequency or urgency  No hot/cold intolerance or polyria  No anxiety or depression    PROBLEM LIST    Patient Active Problem List    Diagnosis Date Noted     Liver transplant rejection (H) 05/17/2022     Priority: Medium     Mild Rejection       Anemia due to chronic kidney disease 11/30/2021     Priority: Medium     Immunosuppressed status (H) 11/29/2021     Priority: Medium     S/P liver transplant (H) 11/29/2021     Priority: Medium     Acute kidney failure, unspecified (H) 09/02/2021     Priority: Medium     Liver transplant planned 08/18/2021     Priority: Medium     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     Priority: Medium     Added automatically from request for surgery [id number]       Secondary esophageal varices without bleeding (H) 06/21/2021     Priority: Medium     PSC (primary sclerosing cholangitis) 06/12/2019     Priority: Medium     Ulcerative pancolitis with complication (H) 06/12/2019     Priority: Medium     Cholangitis 05/13/2018     Priority: Medium     Hypokalemia 05/13/2018     Priority: Medium     Sicca syndrome (H) 12/11/2017     Priority: Medium     Hyperlipidemia 02/23/2012     Priority: Medium     Mixed hearing loss, bilateral 01/07/2009     Priority: Medium     Neoplasm of uncertain behavior of skin 01/26/2007     Priority: Medium     Overview:   LW Modifier:  moderate DN- left buttock  ; Dysplastic Nevus  Overview:   LW Modifier:  recurrent- right  upper back  ; Dysplastic Nevus       Essential hypertension 06/07/2003     Priority: Medium     Overview:   Hypertension         PERTINENT PAST MEDICAL HISTORY:    Past Medical History:   Diagnosis Date     Ascites      Biliary cirrhosis (H)      Cholangitis, sclerosing      Cirrhosis of liver with ascites (H) 3/3/2021     Hearing loss of left ear     wears a hearing aide     History of low potassium      Hyperlipidemia      Hypertension      Liver transplant rejection (H) 5/17/2022    Mild Rejection     SBP (spontaneous bacterial peritonitis) (H) 4/30/2021     Sjogren's syndrome (H)      Ulcerative colitis (H)      Ulcerative pancolitis (H)        PREVIOUS SURGERIES:    Past Surgical History:   Procedure Laterality Date     BENCH LIVER N/A 11/18/2021    Procedure: Bench liver;  Surgeon: Ernesto Schmitt MD;  Location: [facility] OR     COLONOSCOPY N/A 6/8/2022    Procedure: COLONOSCOPY, WITH BIOPSY;  Surgeon: Leventhal, Thomas Michael, MD;  Location: [facility] OR     COLONOSCOPY N/A 10/31/2022    Procedure: COLONOSCOPY, WITH POLYPECTOMY AND BIOPSY;  Surgeon: Kendrick Crawford MD;  Location: [facility] OR     COMBINED ESOPHAGOSCOPY, GASTROSCOPY, DUODENOSCOPY (EGD), REMOVE BILIARY STENT N/A 2/9/2022    Procedure: ESOPHAGOGASTRODUODENOSCOPY, WITH BILIARY STENT REMOVAL;  Surgeon: Leventhal, Thomas Michael, MD;  Location: [facility] OR     CV CORONARY ANGIOGRAM N/A 8/25/2021    Procedure: Coronary Angiogram with possible intervention;  Surgeon: David Wilhelm MD;  Location:  HE[facility]ARDI[facility] CATH LAB     ENDOSCOPIC RETROGRADE CHOLANGIOPANCREATOGRAM N/A 6/25/2021    Procedure: ENDOSCOPIC RETROGRADE CHOLANGIOPANCREATOGRAPHY with ballon sweep of bile ducts for stones, balloon dilation of bile ducts and bile duct stent placement;  Surgeon: Gregory Gabriel MD;  Location:  OR     ENDOSCOPIC RETROGRADE CHOLANGIOPANCREATOGRAM N/A 7/22/2021    Procedure: ENDOSCOPIC RETROGRADE CHOLANGIOPANCREATOGRAPHY STONE REMOVAL, DILATION AND  STENT PLACEMENT;  Surgeon: Gregory Gabriel MD;  Location:  OR     ENDOSCOPIC RETROGRADE CHOLANGIOPANCREATOGRAPHY, EXCHANGE TUBE/STENT N/A 05/05/2021    Procedure: ENDOSCOPIC RETROGRADE CHOLANGIOPANCREATOGRAPHY WITH STENT EXCHANGE, STONE EXTRACTION, AND DILATION;  Surgeon: Gregory Gabriel MD;  Location: [facility] OR     ENDOSCOPIC RETROGRADE CHOLANGIOPANCREATOGRAPHY, EXCHANGE TUBE/STENT N/A 5/10/2021    Procedure: ENDOSCOPIC RETROGRADE CHOLANGIOPANCREATOGRAPHY with biliary dilation, stone removal, stent exchange;  Surgeon: Gregory Gabriel MD;  Location: [facility] OR     ENDOSCOPIC RETROGRADE CHOLANGIOPANCREATOGRAPHY, EXCHANGE TUBE/STENT N/A 6/10/2021    Procedure: ENDOSCOPIC RETROGRADE CHOLANGIOPANCREATOGRAPHY, WITH biliary stent exchange, stone removal;  Surgeon: Woodrow Lott MD;  Location: [facility] OR     ENDOSCOPIC RETROGRADE CHOLANGIOPANCREATOGRAPHY, EXCHANGE TUBE/STENT N/A 8/30/2021    Procedure: ENDOSCOPIC RETROGRADE CHOLANGIOPANCREATOGRAPHY with biliary dilation, stone removal, stent exchange;  Surgeon: Gregory Gabriel MD;  Location: [facility] OR     ENDOSCOPIC RETROGRADE CHOLANGIOPANCREATOGRAPHY, EXCHANGE TUBE/STENT N/A 10/1/2021    Procedure: ENDOSCOPIC RETROGRADE CHOLANGIOPANCREATOGRAPHY with balloon sweep of bile ducts, bile duct stent exchanged;  Surgeon: Gregory Gabriel MD;  Location:  OR     ESOPHAGOSCOPY, GASTROSCOPY, DUODENOSCOPY (EGD), COMBINED N/A 9/4/2021    Procedure: ESOPHAGOGASTRODUODENOSCOPY (EGD);  Surgeon: Leventhal, Thomas Michael, MD;  Location:  GI     ESOPHAGOSCOPY, GASTROSCOPY, DUODENOSCOPY (EGD), COMBINED N/A 10/1/2021    Procedure: ESOPHAGOGASTRODUODENOSCOPY (EGD) with varices banding;  Surgeon: Gregory Gabriel MD;  Location: [facility] OR     GYN SURGERY      bilat fallopian tubes and ovaries removed     IR LIVER BIOPSY PERCUTANEOUS  5/17/2022     PICC DOUBLE LUMEN PLACEMENT Right 05/08/2021    42cm (2cm external), Lateral brachial vein     RETURN LIVER TRANSPLANT N/A 11/19/2021    Procedure: CLOSURE, WOUND,  ABDOMEN, SECONDARY, ABDOMINAL WASHOUT;  Surgeon: Ernesto Schmitt MD;  Location: [facility] OR     TRANSPLANT LIVER RECIPIENT  DONOR N/A 2021    Procedure: Opening of abdomen, Abdominal Exploration and aborted liver transplant.;  Surgeon: Ernesto Schmitt MD;  Location: [facility] OR     TRANSPLANT LIVER RECIPIENT  DONOR N/A 2021    Procedure: TRANSPLANT, LIVER, RECIPIENT,  DONOR;  Surgeon: Ernesto Schmitt MD;  Location: [facility] OR       PREVIOUS ENDOSCOPY:  Colonoscopy 10/31/22  Impression:    - Preparation of the colon was fair.                          - The examined portion of the ileum was normal.                          - One 6 mm polyp in the cecum, removed with a cold                          snare. Resected and retrieved. Clipped x 1.                          - One 2 mm polyp in the ascending colon, removed with                          a cold biopsy forceps. Resected and retrieved.                          - The examination was otherwise normal on direct and                          retroflexion views.                          - Biopsies for surveillance were taken from the entire                          colon and CMV staining.                          No evidence of active inflammation here. If she does                          have PSC-associated IBD her Manzano score would be 0.    A.  COLON, ASCENDING, POLYP:  - Colonic mucosa with benign lymphoid aggregates  - No dysplasia or malignancy identified    B.  CECUM, POLYP:  - Colonic mucosa with benign lymphoid aggregates  - No dysplasia or malignancy identified    C.  COLON, CECUM/ASCENDING ASCENDING, BIOPSY:  - Unremarkable colonic mucosa  - No dysplasia or malignancy identified    D.  COLON, TRANSVERSE, BIOPSY:  - Unremarkable colonic mucosa  - No dysplasia or malignancy identified    E.  COLON, DESCENDING/SIGMOID, BIOPSY:  - Unremarkable colonic mucosa  - No dysplasia or malignancy identified    F.  RECTUM, BIOPSY:  - Mild  chronic inactive proctitis (Re grade 1); see comment  - No dysplasia or malignancy identified    Colonoscopy 6/8/2022  Impression:            - A single (solitary) ulcer in the cecum. Biopsied.    - Ulcerative colitis. Inflammation was found from the sigmoid colon to the cecum. This was graded as Manzano Score 1 (mild disease). Biopsied.   - The distal rectum and anal verge are normal on retroflexion view.   - The examined portion of the ileum was normal.   A.  CECAL ULCER, BIOPSY :  - Colonic mucosa with ulceration and necroinflammatory debris  - POSITIVE for CMV by immunohistochemistry  - Negative for HSV by immunohistochemistry    B.  CECUM AND ASCENDING COLON, BIOPSIES :  - Colonic mucosa with mild crypt distortion and mild chronic inflammation (Re grade 1)  - Negative for dysplasia      C. HEPATIC FLEXURE AND TRANSVERSE COLON, BIOPSIES :  - Colonic mucosa with mild crypt distortion no significant inflammation (Re grade 0)  - Negative for dysplasia      D. DISTAL AND TRANSVERSE COLON, BIOPSIES :  - Colonic mucosa with no significant inflammation (Re grade 0)  - Negative for dysplasia     E. DESCENDING COLON, BIOPSIES :  - Colonic mucosa with mild crypt distortion and mild patchy chronic inflammation (Re grade 1)  - Negative for dysplasia      F. RECTAL BIOPSIES :  - Colonic mucosa with no significant inflammation (Re grade 0)  - Negative for dysplasia     Colonoscopy 12/23/2020 ([facility])  Impression:   - The examined portion of the ileum was normal.   - Erythematous mucosa in the descending colon, in the transverse colon, at the hepatic flexure, in the ascending colon and in the cecum.  Biopsied.     - Normal mucosa in the rectum. Biopsied.    - Internal hemorrhoids.   A.  Colon, cecum, asc, biopsy:    Benign colonic mucosa, without active colitis or epithelial dysplasia     B.  Colon, transverse, biopsy:    Benign colonic mucosa, without active colitis or epithelial dysplasia     C.   Colon, descending, sig, biopsy:    Benign colonic mucosa, without active colitis or epithelial dysplasia     D.  Colon, rectum, biopsy:    Benign colonic mucosa, without active colitis or epithelial dysplasia      11/19/2014 ([facility])  Impression:            - The entire examined colon is  normal. Biopsied due to prior history of microscopic colitis.      - The examination was otherwise  normal.   Colon, random endoscopic biopsies:      1. Rare foci of focal active colitis (see comment).      2. No epithelial dysplasia identified.       ALLERGIES:     Allergies   Allergen Reactions     Diagnostic X-Ray Materials Hives     PATIENT HAD HIVE REACTION AFTER ADMINISTERING CT CONTRAST DYE.       Contrast Dye        PERTINENT MEDICATIONS:    Current Outpatient Medications:      amLODIPine (NORVASC) 5 MG tablet, TAKE 2 TABLETS(10 MG) BY MOUTH DAILY, Disp: 180 tablet, Rfl: 3     aspirin (ASA) 81 MG EC tablet, Take 1 tablet (81 mg) by mouth daily, Disp: , Rfl:      bisacodyl (DULCOLAX) 5 MG EC tablet, Take as directed. One day before exam take 2 tablets at 3 PM. Take 2 tablets at 11 PM., Disp: 4 tablet, Rfl: 0     maribavir (LIVTENCITY) 200 MG tablet, Take 2 tablets (400 mg) by mouth 2 times daily, Disp: 112 tablet, Rfl: 3     multivitamin (CENTRUM SILVER) tablet, Take 1 tablet by mouth daily, Disp: , Rfl:      polyethylene glycol (GOLYTELY) 236 g suspension, Take as directed. One day before exam fill the jug with water. Cover and shake until well mixed. At 6 PM start drinking an 8oz glass of mixture every 15 minutes until jug is 1/2 empty. Store remainder in the refrigerator.  At 11 PM Start drinking the other half of the Golytely jug. Drink one 8-ounce glass every 15 minutes until the jug is empty., Disp: 4000 mL, Rfl: 0     tacrolimus (GENERIC EQUIVALENT) 1 MG capsule, Take 2 capsules (2 mg) by mouth every 12 hours, Disp: 360 capsule, Rfl: 3     ursodiol (ACTIGALL) 300 MG capsule, Take 1 capsule (300 mg) by mouth 2  times daily, Disp: 180 capsule, Rfl: 3   No other NSAID/anticoagulation reported by patient.   No other OTC/herbal/supplements reported by patient.    SOCIAL HISTORY:  Tobacco: Denies  Alcohol: Denies  Drugs Use: Denies  Work: working from home, IT . Would like to be able to get out more.      Social History     Socioeconomic History     Marital status:      Spouse name: Not on file     Number of children: Not on file     Years of education: Not on file     Highest education level: Not on file   Occupational History     Not on file   Tobacco Use     Smoking status: Never     Smokeless tobacco: Never   Vaping Use     Vaping Use: Never used   Substance and Sexual Activity     Alcohol use: No     Comment: Last drink was 2017     Drug use: No     Sexual activity: Not Currently     Partners: Male   Other Topics Concern     Parent/sibling w/ CABG, MI or angioplasty before 65F 55M? Not Asked   Social History Narrative     Not on file     Social Determinants of Health     Financial Resource Strain: Not on file   Food Insecurity: Not on file   Transportation Needs: Not on file   Physical Activity: Not on file   Stress: Not on file   Social Connections: Not on file   Intimate Partner Violence: Not on file   Housing Stability: Not on file       FAMILY HISTORY:  FH of CRC: No  FH of IBD: No - Father had diarrhea she thinks might have been UC, not diagnosed  No Colon/Panc/Esophageal/other GI CA. No IBD or Celiac Disease. No other Autoimmune, Liver, or Thyroid disease.    Family History   Problem Relation Age of Onset     Cancer Mother         angiosarcoma     Coronary Artery Disease Early Onset Father         MI age 46     Heart Transplant Father      Melanoma Sister      Skin Cancer Sister      Liver Disease No family hx of      Ulcerative Colitis No family hx of      Crohn's Disease No family hx of        Past/family/social history reviewed and no changes    PHYSICAL EXAMINATION:  Constitutional: AAOx3,  cooperative, pleasant, not dyspneic/diaphoretic, no acute distress  Vitals reviewed: LMP  (LMP Unknown)   Wt:   Wt Readings from Last 2 Encounters:   07/14/22 75.3 kg (165 lb 14.4 oz)   06/14/22 71.7 kg (158 lb)      Eyes: Sclera anicteric/injected  Ears/nose/mouth/throat: Normal oropharynx without ulcers or exudate, mucus membranes moist, hearing intact  Neck: supple, thyroid normal size  CV: No edema  Respiratory: Unlabored breathing  Lymph: No axillary, submandibular, supraclavicular or inguinal lymphadenopathy  Abd:  Nondistended, +bs, no hepatosplenomegaly, nontender, no peritoneal signs  Skin: warm, perfused, no jaundice  Psych: Normal affect  MSK: Normal gait      PERTINENT STUDIES:    Lab on 07/13/2022   Component Date Value Ref Range Status     WBC Count 07/13/2022 2.3 (A) 4.0 - 11.0 10e3/uL Final     RBC Count 07/13/2022 4.29  3.80 - 5.20 10e6/uL Final     Hemoglobin 07/13/2022 12.9  11.7 - 15.7 g/dL Final     Hematocrit 07/13/2022 38.9  35.0 - 47.0 % Final     MCV 07/13/2022 91  78 - 100 fL Final     MCH 07/13/2022 30.1  26.5 - 33.0 pg Final     MCHC 07/13/2022 33.2  31.5 - 36.5 g/dL Final     RDW 07/13/2022 17.9 (A) 10.0 - 15.0 % Final     Platelet Count 07/13/2022 218  150 - 450 10e3/uL Final     % Neutrophils 07/13/2022 59  % Final     % Lymphocytes 07/13/2022 38  % Final     % Monocytes 07/13/2022 2  % Final     % Eosinophils 07/13/2022 0  % Final     % Basophils 07/13/2022 1  % Final     NRBCs per 100 WBC 07/13/2022 1 (A) <=0 % Final     Absolute Neutrophils 07/13/2022 1.4 (A) 1.6 - 8.3 10e3/uL Final     Absolute Lymphocytes 07/13/2022 0.9  0.8 - 5.3 10e3/uL Final     Absolute Monocytes 07/13/2022 0.0  0.0 - 1.3 10e3/uL Final     Absolute Eosinophils 07/13/2022 0.0  0.0 - 0.7 10e3/uL Final     Absolute Basophils 07/13/2022 0.0  0.0 - 0.2 10e3/uL Final     Absolute NRBCs 07/13/2022 0.0  <=0.0 10e3/uL Final     RBC Morphology 07/13/2022 Confirmed RBC Indices   Final     Platelet Assessment 07/13/2022  Automated Count Confirmed. Platelet morphology is normal.  Automated Count Confirmed. Platelet morphology is normal. Final       Answers for HPI/ROS submitted by the patient on 11/1/2022  General Symptoms: Yes  Skin Symptoms: No  HENT Symptoms: No  EYE SYMPTOMS: No  HEART SYMPTOMS: No  LUNG SYMPTOMS: No  INTESTINAL SYMPTOMS: Yes  URINARY SYMPTOMS: No  GYNECOLOGIC SYMPTOMS: No  BREAST SYMPTOMS: No  SKELETAL SYMPTOMS: Yes  BLOOD SYMPTOMS: No  NERVOUS SYSTEM SYMPTOMS: Yes  MENTAL HEALTH SYMPTOMS: No  Fever: No  Loss of appetite: No  Weight loss: No  Weight gain: Yes  Fatigue: Yes  Night sweats: No  Chills: No  Increased stress: No  Excessive hunger: No  Excessive thirst: No  Feeling hot or cold when others believe the temperature is normal: No  Loss of height: No  Post-operative complications: No  Surgical site pain: No  Hallucinations: No  Change in or Loss of Energy: No  Hyperactivity: No  Confusion: No  Heart burn or indigestion: No  Nausea: No  Vomiting: No  Abdominal pain: No  Bloating: Yes  Constipation: Yes  Diarrhea: No  Blood in stool: No  Black stools: No  Rectal or Anal pain: No  Fecal incontinence: No  Yellowing of skin or eyes: No  Vomit with blood: No  Change in stools: No  Back pain: No  Muscle aches: Yes  Neck pain: Yes  Swollen joints: Yes  Joint pain: Yes  Bone pain: Yes  Muscle cramps: No  Muscle weakness: Yes  Joint stiffness: Yes  Bone fracture: No  Trouble with coordination: No  Dizziness or trouble with balance: No  Fainting or black-out spells: No  Memory loss: No  Headache: No  Seizures: No  Speech problems: No  Tingling: Yes  Tremor: Yes  Weakness: No  Difficulty walking: No  Paralysis: No  Numbness: Yes        Attestation signed by Kendrick Crawford MD at 11/18/2022  2:16 PM:  ATTENDING ATTESTATION:     DATE SEEN: 11/3/22    Patient was discussed, seen, and examined by Kendrick manuel. The plan of care and pertinent data/imaging were reviewed with Dr. Esposito. I agree with the  assessment and plan as delineated above with the following additions:     Now doing very well. Likely does have UC (associated with PSC) but this has been quite mild. Currently does not require treatment. She has been off for years now and has not had flare. I think her recent cecal ulcer was actually driven by CMV and has now resolved after treatment.    Given mucosal healing she would like to follow off mesalamine therapies. As we need to get yearly colonoscopies for PSC associated IBD I think that is reasonable.    Thank you for this consultation.  It was a pleasure to participate in the care of this patient; please contact us with any further questions.  I spent a total of 40 minutes during the day of encounter performed chart review, meeting with patient, patient counseling, care coordination, and documentation.      Please contact me with any further questions.    Kendrick Crawford MD  [facility]  Division of Gastroenterology, Hepatology and Nutrition

## 2022-11-03 NOTE — PATIENT INSTRUCTIONS
It is good to see you again today!    Your colon showed no evidence of active ulcerative colitis and no evidence of CMV.    We will continue to follow you off medications for the ulcerative colitis. If you have symptoms of a flare please let us know and we can restart them.    Follow up in 1 year for visit with Dr. Crawford    Colonoscopy in 1 year

## 2022-11-03 NOTE — NURSING NOTE
"Chief Complaint   Patient presents with     Follow Up       Vitals:    11/03/22 1454   BP: 115/75   Pulse: 102   SpO2: 100%   Weight: 74.7 kg (164 lb 11.2 oz)   Height: 1.676 m (5' 6\")       Body mass index is 26.58 kg/m .    Ada Stokes MA    "

## 2022-11-04 ENCOUNTER — TELEPHONE (OUTPATIENT)
Dept: GASTROENTEROLOGY | Facility: CLINIC | Age: 57
End: 2022-11-04

## 2022-11-04 NOTE — TELEPHONE ENCOUNTER
Screening Questions  BLUE  KIND OF PREP RED  LOCATION [review exclusion criteria] GREEN  SEDATION TYPE        y Are you active on mychart?       Ty Ordering/Referring Provider?        [facility] What type of coverage do you have?      n Have you had a positive covid test in the last 90 days?     26.5 1. BMI  [BMI 40+ - review exclusion criteria]    y  2. Are you able to give consent for your medical care? [IF NO,RN REVIEW]        n  3. Are you taking any prescription pain medications on a routine schedule?      n  3a. EXTENDED PREP What kind of prescription?     n 4. Do you have any chemical dependencies such as alcohol, street drugs, or methadone?    n 5. Do you have any history of post-traumatic stress syndrome, severe anxiety or history of psychosis?      **If yes 3- 5 , please schedule with MAC sedation.**          IF YES TO ANY 6 - 10 - HOSPITAL SETTING ONLY.     n 6.   Do you need assistance transferring?     n 7.   Have you had a heart or lung transplant?    n 8.   Are you currently on dialysis?   n 9.   Do you use daily home oxygen?   n 10. Do you take nitroglycerin?   10a. n If yes, how often?     11. [FEMALES]  n Are you currently pregnant?    11a. n If yes, how many weeks? [ Greater than 12 weeks, OR NEEDED]    n 12. Do you have Pulmonary Hypertension? *NEED PAC APPT AT [facility]*     n 13. [review exclusion criteria]  Do you have any implantable devices in your body (pacemaker, defib, LVAD)?    n 14. In the past 6 months, have you had any heart related issues including cardiomyopathy or heart attack?     14a. n If yes, did it require cardiac stenting if so when?     n 15. Have you had a stroke or Transient ischemic attack (TIA - aka  mini stroke ) within 6 months?      n 16. Do you have mod to severe Obstructive Sleep Apnea?  [Hospital only - Ok at [facility]]    n 17. Do you have SEVERE AND UNCONTROLLED asthma? *NEED PAC APPT AT [facility]*     n 18. Are you currently taking any blood thinners?     18a. If yes,  "inform patient to \"follow up w/ ordering provider for bridging instructions.\"    n 19. Do you take the medication Phentermine?    19a. If yes, \"Hold for 7 days before procedure.  Please consult your prescribing provider if you have questions about holding this medication.\"     n  20. Do you have chronic kidney disease?      n  21. Do you have a diagnosis of diabetes?     n  22. On a regular basis do you go 3-5 days between bowel movements?      23. Preferred LOCAL Pharmacy for Pre Prescription    [ LIST ONLY ONE PHARMACY]        [facility] - [place], [place] - [address] [id number] - [place], [place] - [address] [facility] & Pending [address] to [facility] [address]  [facility] - [address]        - CLOSING REMINDERS -    Informed patient they will need an adult    Cannot take any type of public or medical transportation alone    Conscious Sedation- Needs  for 6 hours after the procedure       MAC/General-Needs  for 24 hours after procedure    Pre-Procedure Covid test to be completed [[facility] PCR Testing Required]    Confirmed Nurse will call to complete assessment       - SCHEDULING DETAILS -       Surgeon      Date of Procedure  Lower Endoscopy [Colonoscopy]  Type of Procedure Scheduled    Location  [facility]-If you answer yes to questions #8, #20, #21Which Colonoscopy Prep was Sent?      Sedation Type      PAC / Pre-op Required         Additional comments:            "

## 2022-11-09 ENCOUNTER — LAB (OUTPATIENT)
Dept: LAB | Facility: CLINIC | Age: 57
End: 2022-11-09
Payer: COMMERCIAL

## 2022-11-09 DIAGNOSIS — Z94.4 S/P LIVER TRANSPLANT (H): ICD-10-CM

## 2022-11-09 DIAGNOSIS — Z94.4 LIVER TRANSPLANTED (H): ICD-10-CM

## 2022-11-09 DIAGNOSIS — B25.9 CYTOMEGALOVIRUS (CMV) VIREMIA (H): ICD-10-CM

## 2022-11-09 LAB
ALBUMIN SERPL-MCNC: 3.8 G/DL (ref 3.4–5)
ALP SERPL-CCNC: 54 U/L (ref 40–150)
ALT SERPL W P-5'-P-CCNC: 18 U/L (ref 0–50)
ANION GAP SERPL CALCULATED.3IONS-SCNC: 6 MMOL/L (ref 3–14)
AST SERPL W P-5'-P-CCNC: 15 U/L (ref 0–45)
BASOPHILS # BLD AUTO: 0.1 10E3/UL (ref 0–0.2)
BASOPHILS NFR BLD AUTO: 1 %
BILIRUB DIRECT SERPL-MCNC: <0.1 MG/DL (ref 0–0.2)
BILIRUB SERPL-MCNC: 0.3 MG/DL (ref 0.2–1.3)
BUN SERPL-MCNC: 18 MG/DL (ref 7–30)
CALCIUM SERPL-MCNC: 9.6 MG/DL (ref 8.5–10.1)
CHLORIDE BLD-SCNC: 112 MMOL/L (ref 94–109)
CO2 SERPL-SCNC: 24 MMOL/L (ref 20–32)
CREAT SERPL-MCNC: 0.76 MG/DL (ref 0.52–1.04)
EOSINOPHIL # BLD AUTO: 0.2 10E3/UL (ref 0–0.7)
EOSINOPHIL NFR BLD AUTO: 4 %
ERYTHROCYTE [DISTWIDTH] IN BLOOD BY AUTOMATED COUNT: 13 % (ref 10–15)
GFR SERPL CREATININE-BSD FRML MDRD: 90 ML/MIN/1.73M2
GLUCOSE BLD-MCNC: 92 MG/DL (ref 70–99)
HCT VFR BLD AUTO: 33.4 % (ref 35–47)
HGB BLD-MCNC: 11.5 G/DL (ref 11.7–15.7)
IMM GRANULOCYTES # BLD: 0.1 10E3/UL
IMM GRANULOCYTES NFR BLD: 1 %
LYMPHOCYTES # BLD AUTO: 3 10E3/UL (ref 0.8–5.3)
LYMPHOCYTES NFR BLD AUTO: 50 %
MCH RBC QN AUTO: 30.3 PG (ref 26.5–33)
MCHC RBC AUTO-ENTMCNC: 34.4 G/DL (ref 31.5–36.5)
MCV RBC AUTO: 88 FL (ref 78–100)
MONOCYTES # BLD AUTO: 0.3 10E3/UL (ref 0–1.3)
MONOCYTES NFR BLD AUTO: 5 %
NEUTROPHILS # BLD AUTO: 2.4 10E3/UL (ref 1.6–8.3)
NEUTROPHILS NFR BLD AUTO: 39 %
PHOSPHATE SERPL-MCNC: 4.3 MG/DL (ref 2.5–4.5)
PLATELET # BLD AUTO: 231 10E3/UL (ref 150–450)
POTASSIUM BLD-SCNC: 4.2 MMOL/L (ref 3.4–5.3)
PROT SERPL-MCNC: 7.5 G/DL (ref 6.8–8.8)
RBC # BLD AUTO: 3.79 10E6/UL (ref 3.8–5.2)
SODIUM SERPL-SCNC: 142 MMOL/L (ref 133–144)
TACROLIMUS BLD-MCNC: 7.6 UG/L (ref 5–15)
TME LAST DOSE: NORMAL H
TME LAST DOSE: NORMAL H
WBC # BLD AUTO: 6 10E3/UL (ref 4–11)

## 2022-11-09 PROCEDURE — 84100 ASSAY OF PHOSPHORUS: CPT

## 2022-11-09 PROCEDURE — 80197 ASSAY OF TACROLIMUS: CPT

## 2022-11-09 PROCEDURE — 82248 BILIRUBIN DIRECT: CPT

## 2022-11-09 PROCEDURE — 85025 COMPLETE CBC W/AUTO DIFF WBC: CPT

## 2022-11-09 PROCEDURE — 36415 COLL VENOUS BLD VENIPUNCTURE: CPT

## 2022-11-09 PROCEDURE — 80053 COMPREHEN METABOLIC PANEL: CPT

## 2022-11-10 LAB — CMV DNA SPEC NAA+PROBE-ACNC: NOT DETECTED IU/ML

## 2022-11-23 ENCOUNTER — LAB (OUTPATIENT)
Dept: LAB | Facility: CLINIC | Age: 57
End: 2022-11-23
Payer: COMMERCIAL

## 2022-11-23 DIAGNOSIS — B25.9 CYTOMEGALOVIRUS (CMV) VIREMIA (H): ICD-10-CM

## 2022-11-23 PROCEDURE — 36415 COLL VENOUS BLD VENIPUNCTURE: CPT

## 2022-11-24 LAB
CMV DNA SPEC NAA+PROBE-ACNC: <137 IU/ML
CMV DNA SPEC NAA+PROBE-LOG#: <2.1 {LOG_COPIES}/ML

## 2022-12-05 ENCOUNTER — OFFICE VISIT (OUTPATIENT)
Dept: GASTROENTEROLOGY | Facility: CLINIC | Age: 57
End: 2022-12-05
Attending: INTERNAL MEDICINE
Payer: COMMERCIAL

## 2022-12-05 ENCOUNTER — TELEPHONE (OUTPATIENT)
Dept: TRANSPLANT | Facility: CLINIC | Age: 57
End: 2022-12-05

## 2022-12-05 ENCOUNTER — LAB (OUTPATIENT)
Dept: LAB | Facility: CLINIC | Age: 57
End: 2022-12-05
Attending: INTERNAL MEDICINE
Payer: COMMERCIAL

## 2022-12-05 VITALS
WEIGHT: 165 LBS | DIASTOLIC BLOOD PRESSURE: 92 MMHG | SYSTOLIC BLOOD PRESSURE: 154 MMHG | BODY MASS INDEX: 26.63 KG/M2 | HEART RATE: 97 BPM | OXYGEN SATURATION: 98 %

## 2022-12-05 DIAGNOSIS — D84.9 IMMUNOSUPPRESSED STATUS (H): ICD-10-CM

## 2022-12-05 DIAGNOSIS — Z94.4 LIVER TRANSPLANTED (H): ICD-10-CM

## 2022-12-05 DIAGNOSIS — K51.019 ULCERATIVE PANCOLITIS WITH COMPLICATION (H): Primary | ICD-10-CM

## 2022-12-05 DIAGNOSIS — Z94.4 S/P LIVER TRANSPLANT (H): ICD-10-CM

## 2022-12-05 DIAGNOSIS — Z48.298 CARE AFTER ORGAN TRANSPLANT: ICD-10-CM

## 2022-12-05 DIAGNOSIS — B25.9 CYTOMEGALOVIRUS (CMV) VIREMIA (H): ICD-10-CM

## 2022-12-05 DIAGNOSIS — A08.39 CMV COLITIS (H): ICD-10-CM

## 2022-12-05 DIAGNOSIS — B25.9 CMV COLITIS (H): ICD-10-CM

## 2022-12-05 PROBLEM — Z76.82 LIVER TRANSPLANT PLANNED: Status: RESOLVED | Noted: 2021-08-18 | Resolved: 2022-12-05

## 2022-12-05 PROBLEM — I85.10 SECONDARY ESOPHAGEAL VARICES WITHOUT BLEEDING (H): Status: RESOLVED | Noted: 2021-06-21 | Resolved: 2022-12-05

## 2022-12-05 PROBLEM — K83.01 PSC (PRIMARY SCLEROSING CHOLANGITIS) (H): Status: RESOLVED | Noted: 2019-06-12 | Resolved: 2022-12-05

## 2022-12-05 LAB
ALBUMIN SERPL BCG-MCNC: 4.3 G/DL (ref 3.5–5.2)
ALP SERPL-CCNC: 58 U/L (ref 35–104)
ALT SERPL W P-5'-P-CCNC: 11 U/L (ref 10–35)
ANION GAP SERPL CALCULATED.3IONS-SCNC: 11 MMOL/L (ref 7–15)
AST SERPL W P-5'-P-CCNC: 18 U/L (ref 10–35)
BASOPHILS # BLD AUTO: 0 10E3/UL (ref 0–0.2)
BASOPHILS NFR BLD AUTO: 0 %
BILIRUB DIRECT SERPL-MCNC: <0.2 MG/DL (ref 0–0.3)
BILIRUB SERPL-MCNC: 0.5 MG/DL
BUN SERPL-MCNC: 18.2 MG/DL (ref 8–23)
CALCIUM SERPL-MCNC: 9.9 MG/DL (ref 8.6–10)
CHLORIDE SERPL-SCNC: 107 MMOL/L (ref 98–107)
CMV DNA SPEC NAA+PROBE-ACNC: <137 IU/ML
CMV DNA SPEC NAA+PROBE-LOG#: <2.1 {LOG_COPIES}/ML
CREAT SERPL-MCNC: 0.85 MG/DL (ref 0.51–0.95)
DEPRECATED HCO3 PLAS-SCNC: 23 MMOL/L (ref 22–29)
EOSINOPHIL # BLD AUTO: 0.3 10E3/UL (ref 0–0.7)
EOSINOPHIL NFR BLD AUTO: 5 %
ERYTHROCYTE [DISTWIDTH] IN BLOOD BY AUTOMATED COUNT: 13.2 % (ref 10–15)
GFR SERPL CREATININE-BSD FRML MDRD: 78 ML/MIN/1.73M2
GLUCOSE SERPL-MCNC: 91 MG/DL (ref 70–99)
HCT VFR BLD AUTO: 34 % (ref 35–47)
HGB BLD-MCNC: 11.7 G/DL (ref 11.7–15.7)
IMM GRANULOCYTES # BLD: 0 10E3/UL
IMM GRANULOCYTES NFR BLD: 0 %
LYMPHOCYTES # BLD AUTO: 3 10E3/UL (ref 0.8–5.3)
LYMPHOCYTES NFR BLD AUTO: 48 %
MCH RBC QN AUTO: 29 PG (ref 26.5–33)
MCHC RBC AUTO-ENTMCNC: 34.4 G/DL (ref 31.5–36.5)
MCV RBC AUTO: 84 FL (ref 78–100)
MONOCYTES # BLD AUTO: 0.3 10E3/UL (ref 0–1.3)
MONOCYTES NFR BLD AUTO: 5 %
NEUTROPHILS # BLD AUTO: 2.5 10E3/UL (ref 1.6–8.3)
NEUTROPHILS NFR BLD AUTO: 42 %
NRBC # BLD AUTO: 0 10E3/UL
NRBC BLD AUTO-RTO: 0 /100
PHOSPHATE SERPL-MCNC: 4 MG/DL (ref 2.5–4.5)
PLATELET # BLD AUTO: 187 10E3/UL (ref 150–450)
POTASSIUM SERPL-SCNC: 4.6 MMOL/L (ref 3.4–5.3)
PROT SERPL-MCNC: 7.4 G/DL (ref 6.4–8.3)
RBC # BLD AUTO: 4.03 10E6/UL (ref 3.8–5.2)
SODIUM SERPL-SCNC: 141 MMOL/L (ref 136–145)
TACROLIMUS BLD-MCNC: 5.7 UG/L (ref 5–15)
TME LAST DOSE: NORMAL H
TME LAST DOSE: NORMAL H
WBC # BLD AUTO: 6.1 10E3/UL (ref 4–11)

## 2022-12-05 PROCEDURE — G0463 HOSPITAL OUTPT CLINIC VISIT: HCPCS

## 2022-12-05 PROCEDURE — 82248 BILIRUBIN DIRECT: CPT | Performed by: PATHOLOGY

## 2022-12-05 PROCEDURE — 84100 ASSAY OF PHOSPHORUS: CPT | Performed by: PATHOLOGY

## 2022-12-05 PROCEDURE — 80197 ASSAY OF TACROLIMUS: CPT | Performed by: INTERNAL MEDICINE

## 2022-12-05 PROCEDURE — 80053 COMPREHEN METABOLIC PANEL: CPT | Performed by: PATHOLOGY

## 2022-12-05 PROCEDURE — 99214 OFFICE O/P EST MOD 30 MIN: CPT | Performed by: INTERNAL MEDICINE

## 2022-12-05 PROCEDURE — 85025 COMPLETE CBC W/AUTO DIFF WBC: CPT | Performed by: PATHOLOGY

## 2022-12-05 PROCEDURE — 36415 COLL VENOUS BLD VENIPUNCTURE: CPT | Performed by: PATHOLOGY

## 2022-12-05 NOTE — LETTER
2022         RE: Berta Nava  [address]  [place] [id number]        Dear Colleague,    Thank you for referring your patient, Berta Nava, to the [facility] [place]. Please see a copy of my visit note below.    Date of Encounter: 2022     Subjective:          Berta Nava is a 57 year old female presenting for follow up with history of liver transplantation    History of Present Illness   Berta Nava is a 57 year old female with past medical history of hypertension, microscopic colitis, ulcerative colitis, pancreatic mucinous cystadenoma, primary sclerosing cholangitis who presents in follow up s/p  donor liver transplant on 2021.  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    Since last seen she continues to have issues with significant joint aches, and numbness and tingling of her upper extremities.  Notes that she is got a relative fatigue where it is difficult for her to ambulate COVID noted that she was able to walk 6 to 8 miles in the spring and summer on a daily basis but now has a challenge ambulating to.  She does feel it has gotten slightly better since we did place a referral to rheumatology but she does not believe she is back to baseline.  Notes that due to delays in seeing rheumatology she has not been scheduled until 2023.    Currently she is on single drug immunosuppression as she is weaned off of MMF for concerns of a mild episode of rejection in May.  Tacrolimus trough level has been approximately 5-8.    She continues to follow with transplant infectious disease for management of her Valcyte resistant CMV    Up-to-date on her COVID vaccination and influenza vaccination          Surgical Course  - OLT date: 2021  - etiology: PSC  - donor: type DBD  - CMV status D positive/R negative  - operation: Surgical technique caval replacement, biliary anastomosis: Duct to duct  -  CiT 6 hrs 22 min, WiT 51 min  - Episodes of Rejection: : 2-3/9 ACR  - Biliary complications: none; stent removed 2022  - Other complications of immediate post-operative course: tissue invasive CMV 2022    Labs and Diagnostic tests:  CBC w/Diff    Lab Results   Component Value Date/Time    WBC 6.1 2022 09:10 AM    WBC 15.2 (H) 2021 09:09 AM    RBC 4.03 2022 09:10 AM    RBC 3.57 (L) 2021 09:09 AM    HGB 11.7 2022 09:10 AM    HGB 10.7 (L) 2021 09:09 AM    HCT 34.0 (L) 2022 09:10 AM    HCT 32.9 (L) 2021 09:09 AM    MCV 84 2022 09:10 AM    MCV 92 2021 09:09 AM    MCH 29.0 2022 09:10 AM    MCH 30.0 2021 09:09 AM    MCHC 34.4 2022 09:10 AM    MCHC 32.5 2021 09:09 AM    RDW 13.2 2022 09:10 AM    RDW 16.4 (H) 2021 09:09 AM         Comprehensive Metabolic Profile    Lab Results   Component Value Date/Time     2022 09:10 AM     (L) 2021 09:09 AM    CO2 23 2022 09:10 AM    CO2 24 2022 07:40 AM    CO2 21 2021 09:09 AM    BUN 18.2 2022 09:10 AM    BUN 18 2022 07:40 AM    BUN 23 2021 09:09 AM    CR 0.85 2022 09:10 AM    CR 0.69 2021 09:09 AM    Lab Results   Component Value Date/Time    ALBUMIN 4.3 2022 09:10 AM    ALBUMIN 3.8 2022 07:40 AM    ALBUMIN 2.8 (L) 2021 09:09 AM    ALKPHOS 58 2022 09:10 AM    ALKPHOS 386 (H) 2021 09:09 AM    AST 18 2022 09:10 AM    AST 58 (H) 2021 09:09 AM    ALT 11 2022 09:10 AM    ALT 32 2021 09:09 AM          Assessment and Plan:    S/P Liver Transplantation:   -She is status post  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  -Labs per protocol  - Excellent allograft function at this time  - Okay to stop aspirin and Ursodiol at this time    Immunosuppression:   - On TAC 2mg BID with goal trough 5-8  - With stability of liver tests off of  MMF, will plan to wean tacrolimus trough target to 3-5  - Will plan to check immunosuppression trough levels per protocol to assess for adequate target dosing and will assess CBC and kidney function for toxicity of medications    Kidney Health:   -No acute issues at this time    Hypertension:  - On amlodipine 10mg daily; Will defer to PCP for escalation of BP meds with Goal BP </= 130/90    CMV: tissue invasive  - Defer treatment to Transplant ID    Pan ulcerative colitis:  - Has established care with Dr. JEANETH Crawford and follow up colonoscopy with resolution of CMV colitis and minimal IBD disease activity  - Will have repeat colonoscopy in ~ 1 year    Nutrition/Weight:    It is very common for patients to put on significant weight after liver transplantation, as they become conditioned to eating as much as possible to maintain a healthy weight pre-transplant.  There is a very significant risk of developing fatty liver and non-alcoholic steatohepatitis in post-transplant patients, even in those who were not transplanted for ALVES cirrhosis.  - Please work on eating a diet that is rice in fresh fruits and vegetables, moderate amounts of lean protein and dairy, and modest amounts of carbohydrates and fats.  - An exercise plan/regimen is an essential part of remaining healthy in the post-transplant setting and we recommend 30-35 minutes of exercise at least 4 days a week    Routine Health Care:  - You need to establish and maintain care with a primary care physician to manage: hypertension, high cholesterol, abnormal blood sugars - as these are all potential complications of your health after liver transplantation and will need a local physician to manage these issues.  - All patients with liver diseases (even post transplant) are at an increased risk for osteoporosis.  We strongly recommend screening for Vitamin D deficiency at least twice yearly with aggressive supplementation/replacement as indicated.    - We recommend a  screening DEXA scan to evaluate for osteoporosis.  If present, should treat with calcium, Vitamin D supplementation, and recommend consideration of bisphosphonate therapy.  Also recommend follow up DEXA scans to evaluate for improvement of bone density on therapy.  - Recommend yearly skin exams with dermatology, and if skin cancers are found, recommend at least twice yearly exams  - Recommend post transplant patients stay up to date for cancer screening: mammograms/PAP for women and prostate cancer screening for men, and colon cancer screening for all.  - Practice good hygiene with washing of hands and maintaining a clean living space as this will decrease your chances of developing an infection in the post-transplantation setting  - Eat a health diet: rich in fresh fruits and vegetables, lean proteins, and minimize carbohydrate and fat intake.  - Remain physically active: this is key to keeping the liver transplant healthy and patient's feeling strong and healthy    Follow Up:  1 year      Thank you very much for the opportunity to participate in the care of this patient.  If you have any further questions, please don't hesitate to contact our office.    __________________________________  Thomas M. Leventhal, M.D.   of Medicine  Advanced & Transplant Hepatology  [facility]

## 2022-12-05 NOTE — NURSING NOTE
Chief Complaint   Patient presents with     RECHECK     S/p liver tx     Blood pressure (!) 154/92, pulse 97, weight 74.8 kg (165 lb), SpO2 98 %, not currently breastfeeding.    Morteza Alexandre on 12/5/2022 at 9:59 AM

## 2022-12-05 NOTE — TELEPHONE ENCOUNTER
Leventhal, Wilmer Fernandez MD sent to Vanessa Durant, ADRIANA  Once she is off MMF for ~ 2 months, can lower Tac goal to 3-5   Okay to stop the aspirin   Okay to stop ursodiol     Berta had a visit w/ Dr. Leventhal this morning.   She has an upcoming appt w/ Dr. Dasilva on 12/13. Berta still feels very achy.  She wis wondering if this could be from the maribavir. CMV PCR's have been negative.   If Dr. Dasilva stops maribavir we will need to check tac level shortly after.

## 2022-12-05 NOTE — PROGRESS NOTES
Date of Encounter: 2022     Subjective:          Berta Nava is a 57 year old female presenting for follow up with history of liver transplantation    History of Present Illness   Berta Nava is a 57 year old female with past medical history of hypertension, microscopic colitis, ulcerative colitis, pancreatic mucinous cystadenoma, primary sclerosing cholangitis who presents in follow up s/p  donor liver transplant on 2021.  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    Since last seen she continues to have issues with significant joint aches, and numbness and tingling of her upper extremities.  Notes that she is got a relative fatigue where it is difficult for her to ambulate COVID noted that she was able to walk 6 to 8 miles in the spring and summer on a daily basis but now has a challenge ambulating to.  She does feel it has gotten slightly better since we did place a referral to rheumatology but she does not believe she is back to baseline.  Notes that due to delays in seeing rheumatology she has not been scheduled until 2023.    Currently she is on single drug immunosuppression as she is weaned off of MMF for concerns of a mild episode of rejection in May.  Tacrolimus trough level has been approximately 5-8.    She continues to follow with transplant infectious disease for management of her Valcyte resistant CMV    Up-to-date on her COVID vaccination and influenza vaccination          Surgical Course  - OLT date: 2021  - etiology: PSC  - donor: type DBD  - CMV status D positive/R negative  - operation: Surgical technique caval replacement, biliary anastomosis: Duct to duct  - CiT 6 hrs 22 min, WiT 51 min  - Episodes of Rejection: : 2-3/9 ACR  - Biliary complications: none; stent removed 2022  - Other complications of immediate post-operative course: tissue invasive CMV 2022    Labs and Diagnostic tests:  CBC w/Diff    Lab Results    Component Value Date/Time    WBC 6.1 2022 09:10 AM    WBC 15.2 (H) 2021 09:09 AM    RBC 4.03 2022 09:10 AM    RBC 3.57 (L) 2021 09:09 AM    HGB 11.7 2022 09:10 AM    HGB 10.7 (L) 2021 09:09 AM    HCT 34.0 (L) 2022 09:10 AM    HCT 32.9 (L) 2021 09:09 AM    MCV 84 2022 09:10 AM    MCV 92 2021 09:09 AM    MCH 29.0 2022 09:10 AM    MCH 30.0 2021 09:09 AM    MCHC 34.4 2022 09:10 AM    MCHC 32.5 2021 09:09 AM    RDW 13.2 2022 09:10 AM    RDW 16.4 (H) 2021 09:09 AM         Comprehensive Metabolic Profile    Lab Results   Component Value Date/Time     2022 09:10 AM     (L) 2021 09:09 AM    CO2 23 2022 09:10 AM    CO2 24 2022 07:40 AM    CO2 21 2021 09:09 AM    BUN 18.2 2022 09:10 AM    BUN 18 2022 07:40 AM    BUN 23 2021 09:09 AM    CR 0.85 2022 09:10 AM    CR 0.69 2021 09:09 AM    Lab Results   Component Value Date/Time    ALBUMIN 4.3 2022 09:10 AM    ALBUMIN 3.8 2022 07:40 AM    ALBUMIN 2.8 (L) 2021 09:09 AM    ALKPHOS 58 2022 09:10 AM    ALKPHOS 386 (H) 2021 09:09 AM    AST 18 2022 09:10 AM    AST 58 (H) 2021 09:09 AM    ALT 11 2022 09:10 AM    ALT 32 2021 09:09 AM          Assessment and Plan:    S/P Liver Transplantation:   -She is status post  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  -Labs per protocol  - Excellent allograft function at this time  - Okay to stop aspirin and Ursodiol at this time    Immunosuppression:   - On TAC 2mg BID with goal trough 5-8  - With stability of liver tests off of MMF, will plan to wean tacrolimus trough target to 3-5  - Will plan to check immunosuppression trough levels per protocol to assess for adequate target dosing and will assess CBC and kidney function for toxicity of medications    Kidney Health:   -No acute issues at this  time    Hypertension:  - On amlodipine 10mg daily; Will defer to PCP for escalation of BP meds with Goal BP </= 130/90    CMV: tissue invasive  - Defer treatment to Transplant ID    Pan ulcerative colitis:  - Has established care with Dr. JEANETH Crawford and follow up colonoscopy with resolution of CMV colitis and minimal IBD disease activity  - Will have repeat colonoscopy in ~ 1 year    Nutrition/Weight:    It is very common for patients to put on significant weight after liver transplantation, as they become conditioned to eating as much as possible to maintain a healthy weight pre-transplant.  There is a very significant risk of developing fatty liver and non-alcoholic steatohepatitis in post-transplant patients, even in those who were not transplanted for ALVES cirrhosis.  - Please work on eating a diet that is rice in fresh fruits and vegetables, moderate amounts of lean protein and dairy, and modest amounts of carbohydrates and fats.  - An exercise plan/regimen is an essential part of remaining healthy in the post-transplant setting and we recommend 30-35 minutes of exercise at least 4 days a week    Routine Health Care:  - You need to establish and maintain care with a primary care physician to manage: hypertension, high cholesterol, abnormal blood sugars - as these are all potential complications of your health after liver transplantation and will need a local physician to manage these issues.  - All patients with liver diseases (even post transplant) are at an increased risk for osteoporosis.  We strongly recommend screening for Vitamin D deficiency at least twice yearly with aggressive supplementation/replacement as indicated.    - We recommend a screening DEXA scan to evaluate for osteoporosis.  If present, should treat with calcium, Vitamin D supplementation, and recommend consideration of bisphosphonate therapy.  Also recommend follow up DEXA scans to evaluate for improvement of bone density on therapy.  -  Recommend yearly skin exams with dermatology, and if skin cancers are found, recommend at least twice yearly exams  - Recommend post transplant patients stay up to date for cancer screening: mammograms/PAP for women and prostate cancer screening for men, and colon cancer screening for all.  - Practice good hygiene with washing of hands and maintaining a clean living space as this will decrease your chances of developing an infection in the post-transplantation setting  - Eat a health diet: rich in fresh fruits and vegetables, lean proteins, and minimize carbohydrate and fat intake.  - Remain physically active: this is key to keeping the liver transplant healthy and patient's feeling strong and healthy    Follow Up:  1 year      Thank you very much for the opportunity to participate in the care of this patient.  If you have any further questions, please don't hesitate to contact our office.    __________________________________  Thomas M. Leventhal, M.D.   of Medicine  Advanced & Transplant Hepatology  [facility]

## 2022-12-13 ENCOUNTER — VIRTUAL VISIT (OUTPATIENT)
Dept: INFECTIOUS DISEASES | Facility: CLINIC | Age: 57
End: 2022-12-13
Attending: INTERNAL MEDICINE
Payer: COMMERCIAL

## 2022-12-13 DIAGNOSIS — M25.551 PAIN OF BOTH HIP JOINTS: ICD-10-CM

## 2022-12-13 DIAGNOSIS — M25.571 ARTHRALGIA OF BOTH ANKLES: ICD-10-CM

## 2022-12-13 DIAGNOSIS — M25.552 PAIN OF BOTH HIP JOINTS: ICD-10-CM

## 2022-12-13 DIAGNOSIS — B25.9 CYTOMEGALOVIRUS (CMV) VIREMIA (H): Primary | ICD-10-CM

## 2022-12-13 DIAGNOSIS — M25.572 ARTHRALGIA OF BOTH ANKLES: ICD-10-CM

## 2022-12-13 PROCEDURE — 99215 OFFICE O/P EST HI 40 MIN: CPT | Mod: GT | Performed by: INTERNAL MEDICINE

## 2022-12-13 NOTE — PATIENT INSTRUCTIONS
Right hand sling; ask the pharmacist if it's cheaper with prescription, then I can write you a prescription.   Please call [phone] to schedule an appointment with me in one month, virtual or in-person.   Please call this number [phone] to schedule the the Xray.    Please hold maribavir and check CMV PCR weekly.   Follow up with your primary MD and rheumatology for the hips and ankles pain.     Thank you,   Forest Dasilva MD

## 2022-12-13 NOTE — PROGRESS NOTES
Berta is a 57 year old who is being evaluated via a billable video visit.      How would you like to obtain your AVS? MyChart  If the video visit is dropped, the invitation should be resent by: Text to cell phone: [phone]  Will anyone else be joining your video visit? No   Start time: 4:20 PM.   End time: 4:39 pm.   Home.   I was available onsite.   Patricia.

## 2022-12-13 NOTE — LETTER
12/13/2022       RE: Berta Nava  [address] [facility] [address]     Dear Colleague,    Thank you for referring your patient, Berta Nava, to the [facility]FECTI[facility] [place] at [facility]. Please see a copy of my visit note below.    [facility]    Transplant Infectious Diseases Outpatient Consultation     Patient:  Berta Nava, Date of birth 1965, Medical record number [id number]  Date of Visit:  12/13/2022  Consult requested by Dr. Leventhal for evaluation of CMV colitis.           Recommendations:   1. Hold maribavir.   2. CMV PCR weekly while off of maribavir.   3. Will discuss with hepatology team TAC level.   4. Though needs 23-valent pneumovax and repeat shingrix vaccine, will hold off for now while we hare holding maribavir.   5. Bilateral hip X-ray and ankles X-ray pending PCP and rheumatology evaluation.   6. Right hand wrist sling.     RTC: 1 month, virtual or in-person.         Summary of Presentation:   Transplants:  11/18/2021 (Liver), Postoperative day:  390     This patient is a 56 year old female with PBC-liver cirrhosis s/p OLT in 11/2021. Was on TAC/prednisone.   ?rejection 5/2022, MMF was added to TAC/prednisone.    IS deescalated to TAC/prednisone in 6/2022 due to CMV infection then TAC due to recurrent resistant CMV viremia.   History of UC with screening colonoscopy showing an evidence of CMV colitis. CMV PCR checked and was at 4.8 log 6/2022.         Active Problems and Infectious Diseases Issues:   1. Primary, donor-derived CMV viremia and colitis.   This followed an escalation of IS due to mild rejection. MMF was discontinued.   VGCV induction therapy was started on 6/15/2022 and increased to 1350 mg bid due to slow response (1.3 log decline over 3 weeks). The CMV continued to decline but did not go below 2.1 log. 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    Maribavir was started on 8/16/2022 with adequate response with declining VL to <2.1. However, viremia recurred only one week after stopping maribavir and initiating letermovir suppressive therapy.   Maribavir was resumed around 9/15/2022, again with adequate response.   In an attempt to check if we can try to discontinue maribavir again, CMV-specific CD4 and CD8 were checked on 10/7/2022; the results showed significantly low CD4 and reasonable (on the low side CD8).     The only way to check if the patient is ready to come off of maribavir is either to continue to check CMV-specific T-cell immunity or a trial of stopping maribavir with weekly CMV PCR.   Will hold maribavir and check CMV PCR weekly for now. Will confirm with hepatology the TAC dosing as the target levels should be between 3-5 but hers were always >5.     Of note, colonoscopy was repeated 10/31/2022 with resolution of the ileal ulcer and no CMV on random biopsies.     2. Arthralgia  Pending rheumatology evaluation, will check X-ray.     3. Right hand carpal tunnel syndrome.   Recommended sling for the right wrist.         Old Problems and Infectious Diseases Issues:   1. SBP and BSI with S mitis 4/29/2021; on IV ABx since 4/29/2021 for possible association with liver abscess.    2. Indeterminate QuantiFeron due to low reaction to mitogen likely due to the patient's underlying liver disease. This patient is low risk for TB, CXR unremarkable; no further evaluations were deemed necessary.     Other Infectious Disease issues include:  - QTc: 465 as of 11/2021.   - PCP prophylaxis: bactrim until 4/24/2022 (5-6 months).   - Serostatus: CMV D+/R-, EBV D+/R+, HSV1?/2?, VZV ?. Currently on maribavir.    - Immunization status: This patient received the fifth dose (second booster) of the COVID-19 vaccine on 10/12/2022. Otherwise she needs to repeat the shingrix vaccine series since she only received one dose on 6/21/2021. Will need 23-valent  pneumonia vaccine. I would not like to make to many changes that would affect the CMV. So I will hold off on these vaccinations for now.   - Gamma globulin status: >1000 as of 10/12/2022.       Attestation:  Total duration of visit including chart review, reviewing labs and imaging, interviewing and examining the patient, documentation, and sending communication to the patient and to the primary treating team, all at the same day of this encounter, is: 60 minutes.   Forest Dasilva MD    Contact information available via [facility] Paging/Directory     12/13/2022           Interim History:   Has been c/o joint pain in hips and ankles bilaterally. Mostly in the morning and gets better during the day. Overall improving but not resolved.   Swollen ankles bilaterally towards the end of the day but not in the morning and without redness.   Also c/o numbness of the right hand at night time.     No fever, no diarrhea, no N/V. She does have dysgeusia with maribavir.         History of the Infectious Disease lllness:       Transplants:  11/18/2021 (Liver); Postoperative day:  390    55 yo female with PBC-liver cirrhosis s/p OLT in 11/2021. Also history of UC for which she undergoes screening colonoscopy.   During the last screening colonoscopy on 6/8/2022, she was found to have a single, 2mm cecal ulcer. The biopsy was c/w CMV infection.   MMF was discontinued.   The patient had no fever, diarrhea, N/V or abdominal pain. She was started on PO VGCV, later CMV VL was 4.8 log. Due to lack of symptoms, PO VGCV was continued. Unfortunately, after initial response, VL persisted at low level and did not respond to high dose VGCV. 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 CMV VL recurred while on suppressive therapy with letermovir. Maribavir was resumed with adequate response. CMV-specific immunity was not adequate enough to stop maribavir.   The patient was asymptomatic all along.      The patient had mild ACR on a liver biopsy 5/2022, MMF was readminister and TAC/prednisone were maintained.     Exposure History:  Lives with her  in a single family house in the [facility] area.   Does some gardening but wear gloves.   Traveled to [facility] in the past.   The only international travel was to [place], to a resort only.   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   Has two grown up children, do not live with her.   Has a dog.          Review of Systems:   As mentioned in the interim history otherwise negative by reviewing constitutional symptoms, central and peripheral neurological systems, respiratory system, cardiac system, GI system,  system, musculoskeletal, skin, allergy, and lymphatics.           Immunizations:     Immunization History   Administered Date(s) Administered     COVID-19 Vaccine 18+ (Moderna) 02/25/2021, 03/18/2021, 04/15/2021, 03/21/2022     COVID-19 Vaccine Bivalent Booster 12+ (Pfizer) 10/12/2022     Flu, Unspecified 10/18/2016, 11/01/2017     HepA-Adult 05/12/2021     HepB-Adult 05/11/2021     Influenza (IIV3) PF 11/10/2010     Influenza Vaccine 50-64 or 18-64 w/egg allergy (Flublok) 10/12/2022     Influenza Vaccine >6 months (Alfuria,Fluzone) 09/03/2015, 12/30/2019, 09/15/2020     Influenza Vaccine, 6+MO IM (QUADRIVALENT W/PRESERVATIVES) 10/17/2018     Pneumo Conj 13-V (2010&after) 06/12/2021     Td (Adult), Adsorbed 03/06/1995, 05/27/2005     Tdap (Adacel,Boostrix) 07/01/2014     Twinrix A/B 06/12/2021     Zoster vaccine recombinant adjuvanted (SHINGRIX) 06/21/2021             Allergies:     Allergies   Allergen Reactions     Diagnostic X-Ray Materials Hives     PATIENT HAD HIVE REACTION AFTER ADMINISTERING CT CONTRAST DYE.       Contrast Dye              Medications:     Current Outpatient Medications   Medication Sig     amLODIPine (NORVASC) 5 MG tablet TAKE 2 TABLETS(10 MG) BY MOUTH DAILY     maribavir (LIVTENCITY) 200 MG tablet Take 2 tablets (400 mg) by mouth 2 times  daily     multivitamin (CENTRUM SILVER) tablet Take 1 tablet by mouth daily     tacrolimus (GENERIC EQUIVALENT) 1 MG capsule Take 2 capsules (2 mg) by mouth every 12 hours     No current facility-administered medications for this visit.            Physical Exam:   No vitals are available during this virtual visit.   Constitutional: awake, alert, cooperative, no apparent distress and appears at stated age, well nourished.            Laboratory Data:     No results found for: ACD4    Inflammatory Markers    Recent Labs   Lab Test 11/17/21  1908 08/19/21  0018 06/08/21  1602 06/01/21  0915 05/25/21  1455 05/21/21  1500 05/18/21  1010   CRP 54.0* 40.0* 23.1* 16.1* 14.3* 16.1* 21.4*       Immune Globulin Studies      Recent Labs   Lab Test 10/12/22  0758   IGG 1,145       Metabolic Studies    Recent Labs   Lab Test 12/05/22  0910 11/09/22  0740 10/05/22  0749 09/07/22  0745 08/10/22  0740 08/01/22  1653 07/27/22  0756 12/06/21  1031 12/03/21  1145 11/23/21  0551 11/23/21  0538 11/19/21  0127 11/19/21  0126    142 142 140 142 142 139   < > 142   < >  --    < >  --    POTASSIUM 4.6 4.2 4.3 3.8 3.5 3.8 4.1   < > 4.4   < >  --    < >  --    CHLORIDE 107 112* 113* 112* 111* 111* 111*   < > 113*   < >  --    < >  --    CO2 23 24 21 22 23 22 21   < > 23   < >  --    < >  --    ANIONGAP 11 6 8 6 8 9 7   < > 6   < >  --    < >  --    BUN 18.2 18 22 21 24 31* 23   < > 24   < >  --    < >  --    CR 0.85 0.76 0.81 0.83 0.85 0.81 0.68   < > 0.65   < >  --    < >  --    GFRESTIMATED 78 90 83 81 79 84 >90   < > >90   < >  --    < >  --    GLC 91 92 100* 93 91 93 84   < > 97   < >  --    < >  --    A1C  --   --   --   --   --   --   --   --   --   --   --   --  5.4   MARIELENA 9.9 9.6 9.4 9.0 8.8 9.1 8.7   < > 8.4*   < >  --    < >  --    PHOS 4.0 4.3 4.4 4.0 3.3  --  3.3   < > 3.5   < > 1.9*   < >  --    MAG  --   --   --   --  1.8  --  2.0   < > 1.7   < > 2.2   < >  --    LACT  --   --   --   --   --   --   --   --  0.6*  --  0.8   <  >  --     < > = values in this interval not displayed.       Hepatic Studies    Recent Labs   Lab Test 12/05/22  0910 11/09/22  0740 10/05/22  0749 09/07/22  0745 08/10/22  0740 08/01/22  1653   BILITOTAL 0.5 0.3 0.6 0.6 0.6 0.4   ALKPHOS 58 54 66 50 49 52   PROTTOTAL 7.4 7.5 7.7 7.1 6.9 7.4   ALBUMIN 4.3 3.8 3.7 3.6 3.6 3.9   AST 18 15 18 25 21 18   ALT 11 18 17 32 30 28       Hematology Studies     Recent Labs   Lab Test 12/05/22  0910 11/09/22  0740 10/12/22  0758 09/07/22  0745 08/10/22  0740 08/01/22  1653 07/27/22  0756 07/20/22  0805 07/13/22  0758 07/06/22  0801 06/21/21  0938 06/12/21  1259   WBC 6.1 6.0 4.4 4.5 2.2* 2.3* 2.0*   < > 2.3* 2.2*   < > 21.3*   ANEU  --   --   --   --  0.9* 1.4* 1.1*  --  1.4* 1.2*  --  18.5*   ALYM  --   --   --   --  1.2 0.9 0.9  --  0.9 0.9  --  1.1   BRIGIDO  --   --   --   --  0.1 0.0 0.0  --  0.0 0.1  --  1.5*   AEOS  --   --   --   --  0.0 0.0 0.0  --  0.0 0.0  --  0.0   HGB 11.7 11.5* 10.4* 11.7 12.7 12.0 12.4   < > 12.9 12.1   < > 10.3*   HCT 34.0* 33.4* 29.9* 33.6* 37.9 36.2 36.8   < > 38.9 37.9   < > 32.3*    231 181 156 171 156 175   < > 218 204   < > 291    < > = values in this interval not displayed.       Clotting Studies    Recent Labs   Lab Test 05/17/22  1011 12/13/21  1022 12/03/21  1145 11/22/21  0419 11/21/21  0318 11/20/21  0508   INR 1.09 1.12 1.13 1.22* 1.34*  --    PTT  --   --  36 29 31 33       Urine Studies    Recent Labs   Lab Test 07/27/22  0803 12/03/21  1342 11/24/21 2014 11/20/21  1030 11/06/21  0509 10/25/21  1339 09/01/21  1757   URINEPH 7.0 6.0 5.0 5.0 5.5 5.5 5.5   NITRITE Negative Negative Negative Negative Negative Negative Negative   LEUKEST Negative Negative Negative Negative Negative Negative Negative   WBCU  --  1  --  3 1 1 2         Microbiology:  Last 6 Culture results with specimen source  Culture Micro   Date Value Ref Range Status   06/10/2021 No growth  Final   06/10/2021 Canceled, Test credited  Final   06/10/2021 Duplicate  request  Final   06/10/2021   Final    Notification of test cancellation was given to  Christine Torres RN from [facility]. 6/10/21 at 0812.TV.     06/10/2021 No growth  Final   06/09/2021 No growth  Final    Specimen Description   Date Value Ref Range Status   06/10/2021 Ascites Fluid  Final   06/10/2021 Ascites Fluid  Final   06/10/2021 Midstream Urine  Final   06/10/2021 Midstream Urine  Final   06/09/2021 Blood Left Arm  Final   06/09/2021 Blood PICC  Final      No results found for: CMV    Last check of C difficile  C Diff Toxin B PCR   Date Value Ref Range Status   05/16/2018 Negative NEG^Negative Final     Comment:     Negative: Clostridium difficile target DNA sequences NOT detected, presumed   negative for Clostridium difficile toxin B or the number of bacteria present   may be below the limit of detection for the test.  FDA approved assay performed using [employer] real-time PCR.  A negative result does not exclude actual disease due to Clostridium difficile   and may be due to improper collection, handling and storage of the specimen   or the number of organisms in the specimen is below the detection limit of the   assay.       C Difficile Toxin B by PCR   Date Value Ref Range Status   12/03/2021 Negative Negative Final     Comment:     A negative result does not exclude actual disease due to C. difficile and may be due to improper collection, handling and storage of the specimen or the number of organisms in the specimen is below the detection limit of the assay.         Virology:  CMV viral loads    CMV viral loads    CMV DNA IU/mL   Date Value Ref Range Status   12/05/2022 <137 (A) Not Detected IU/mL Final     Comment:     CMV DNA detected, less than 137 IU/mL     CMV DNA IU/mL, Instrument   Date Value Ref Range Status   09/14/2022 1,177 (H) <1 IU/mL Final   08/24/2022 181 (H) <1 IU/mL Final   08/17/2022 244 (H) <1 IU/mL Final   08/10/2022 467 (H) <1 IU/mL Final   08/03/2022 211 (H) <1 IU/mL Final    07/27/2022 206 (H) <1 IU/mL Final   07/20/2022 259 (H) <1 IU/mL Final   07/13/2022 932 (H) <1 IU/mL Final   07/06/2022 3,537 (H) <1 IU/mL Final     CMV viral loads    Recent Labs   Lab Test 12/05/22  0910 09/21/22  0758 09/14/22  0747 08/31/22  0748 08/24/22  0740 08/17/22  0750 08/10/22  0740 08/03/22  0753 07/27/22  0756 07/20/22  0805 07/13/22  0758 07/06/22  0801   CMVQNT <137*   < >  --    < >  --   --   --   --   --   --   --   --    CMVRESINST  --   --  1,177*  --  181* 244* 467* 211* 206* 259* 932* 3,537*   CMVLOG <2.1   < > 3.1   < > 2.3 2.4 2.7 2.3 2.3 2.4 3.0 3.5    < > = values in this interval not displayed.       CMV viral loads    CMV DNA IU/mL   Date Value Ref Range Status   12/05/2022 <137 (A) Not Detected IU/mL Final     Comment:     CMV DNA detected, less than 137 IU/mL   11/23/2022 <137 (A) Not Detected IU/mL Final     Comment:     CMV DNA detected, less than 137 IU/mL   11/09/2022 Not Detected Not Detected IU/mL Final   10/26/2022 Not Detected Not Detected IU/mL Final   10/12/2022 <137 (A) Not Detected IU/mL Final     Comment:     CMV DNA detected, less than 137 IU/mL   10/05/2022 <137 (A) Not Detected IU/mL Final     Comment:     CMV DNA detected, less than 137 IU/mL   09/28/2022 <137 (A) Not Detected IU/mL Final     Comment:     CMV DNA detected, less than 137 IU/mL   09/21/2022 <137 (A) Not Detected IU/mL Final     Comment:     CMV DNA detected, less than 137 IU/mL   09/07/2022 <137 (A) Not Detected IU/mL Final     Comment:     CMV DNA detected, less than 137 IU/mL     CMV DNA IU/mL, Instrument   Date Value Ref Range Status   09/14/2022 1,177 (H) <1 IU/mL Final   08/24/2022 181 (H) <1 IU/mL Final   08/17/2022 244 (H) <1 IU/mL Final   08/10/2022 467 (H) <1 IU/mL Final   08/03/2022 211 (H) <1 IU/mL Final   07/27/2022 206 (H) <1 IU/mL Final   07/20/2022 259 (H) <1 IU/mL Final   07/13/2022 932 (H) <1 IU/mL Final   07/06/2022 3,537 (H) <1 IU/mL Final     CMV log   Date Value Ref Range Status    12/05/2022 <2.1  Final   11/23/2022 <2.1  Final   10/12/2022 <2.1  Final   10/05/2022 <2.1  Final   09/28/2022 <2.1  Final   09/21/2022 <2.1  Final   09/14/2022 3.1  Final   09/07/2022 <2.1  Final   08/31/2022 <2.1  Final   08/24/2022 2.3  Final   08/17/2022 2.4  Final   08/10/2022 2.7  Final   08/03/2022 2.3  Final   07/27/2022 2.3  Final   07/20/2022 2.4  Final   07/13/2022 3.0  Final   07/06/2022 3.5  Final   06/29/2022 3.9  Final   06/22/2022 4.4  Final   06/14/2022 4.8  Final       CMV resistance testing  Recent Labs   Lab Test 07/27/22  0759   CMVCID Sensitive   CMVFOS Sensitive   CMVGAN Resistant     CMV Cidofovir Resist   Date Value Ref Range Status   07/27/2022 Sensitive  Final     CMV Foscarnet Resist   Date Value Ref Range Status   07/27/2022 Sensitive  Final     CMV Ganciclovir Resis   Date Value Ref Range Status   07/27/2022 Resistant  Final     Comment:       The following UL54 mutations were detected: None  The following UL97 mutations were detected: C603W  INTERPRETIVE INFORMATION: CMV Antiviral Resistance    Codons 457-630 of the UL97 gene and codons 393-1000 of the   UL54 gene are sequenced. Mutations associated with   resistance to ganciclovir, cidofovir, and foscarnet are   reported. Mutations in viral sub-populations below 20   percent of total may not be detected.    This test was developed and its performance characteristics   determined by [facility]. It has not been cleared or   approved by the US Food and Drug Administration. This test   was performed in a CLIA certified laboratory and is   intended for clinical purposes.  Performed By: [facility]  [address] [place] [id number]  : Ariel Frank MD, PhD        EBV viral loads   No lab results found.  No results found for: EBVDN, EBRES, EBVDN, EBVSP, EBVPC, EBVPCR    Human Herpes Virus 6 viral loads    No results found for: H6RES No results found for: H6SPEC    CMV Antibody IgG   Date Value  Ref Range Status   11/17/2021 No detectable antibody. No detectable antibody.  Final   08/18/2021 No detectable antibody. No detectable antibody.  Final     CMV Antibody IgM   Date Value Ref Range Status   11/17/2021 Negative Negative Final   08/18/2021 Negative Negative Final     No results found for: EBIG2, EBIGM, EBVIGG, EBIGG, EBVAGN, MA3871, TOXG      Pathology:  Colon Bx 10/31/2022  Addendum   CMV immunohistochemical stains performed on parts C. - F.  are negative.  Analyte Specific Reagents (ASRs) are used in many laboratory tests necessary for standard medical care and generally do not require FDA approval. This test was developed and its performance characteristics determined by [facility]. It has not been cleared or approved by the U.S. Food and Drug Administration.  [facility] are certified for the performance of high-complexity clinical testing under the Clinical Laboratory Improvement Amendments of 1988 (CLIA), and in keeping with the certification requirements, the laboratory has verified this test's accuracy, precision and/or validity of the method.      Addendum electronically signed by Jagdish Mccormick DO on 11/2/2022 at  2:45 PM   Final Diagnosis   A.  COLON, ASCENDING, POLYP:  - Colonic mucosa with benign lymphoid aggregates  - No dysplasia or malignancy identified    B.  CECUM, POLYP:  - Colonic mucosa with benign lymphoid aggregates  - No dysplasia or malignancy identified    C.  COLON, CECUM/ASCENDING ASCENDING, BIOPSY:  - Unremarkable colonic mucosa  - No dysplasia or malignancy identified    D.  COLON, TRANSVERSE, BIOPSY:  - Unremarkable colonic mucosa  - No dysplasia or malignancy identified    E.  COLON, DESCENDING/SIGMOID, BIOPSY:  - Unremarkable colonic mucosa  - No dysplasia or malignancy identified    F.  RECTUM, BIOPSY:  - Mild chronic inactive proctitis (Re grade 1); see comment  - No dysplasia or malignancy identified        Colon bx 6/8/2022  Final Diagnosis   A.  CECAL ULCER, BIOPSY :  - Colonic mucosa with ulceration and necroinflammatory debris  - POSITIVE for CMV by immunohistochemistry  - Negative for HSV by immunohistochemistry    B.  CECUM AND ASCENDING COLON, BIOPSIES :  - Colonic mucosa with mild crypt distortion and mild chronic inflammation (Re grade 1)  - Negative for dysplasia      C. HEPATIC FLEXURE AND TRANSVERSE COLON, BIOPSIES :  - Colonic mucosa with mild crypt distortion no significant inflammation (Er grade 0)  - Negative for dysplasia      D. DISTAL AND TRANSVERSE COLON, BIOPSIES :  - Colonic mucosa with no significant inflammation (Re grade 0)  - Negative for dysplasia     E. DESCENDING COLON, BIOPSIES :  - Colonic mucosa with mild crypt distortion and mild patchy chronic inflammation (Re grade 1)  - Negative for dysplasia      F. RECTAL BIOPSIES :  - Colonic mucosa with no significant inflammation (Re grade 0)  - Negative for dysplasia      Liver biopsy 5/17/2022  Final Diagnosis   A.  LIVER, ALLOGRAFT, NEEDLE CORE BIOPSY:  - Mild focal portal inflammation indeterminate for acute cellular rejection (CHIU: 2-3/9)       Berta is a 57 year old who is being evaluated via a billable video visit.      How would you like to obtain your AVS? MyChart  If the video visit is dropped, the invitation should be resent by: Text to cell phone: [phone]  Will anyone else be joining your video visit? No   Start time: 4:20 PM.   End time: 4:39 pm.   Home.   I was available onsite.   Patricia.     Again, thank you for allowing me to participate in the care of your patient.      Sincerely,    Forest Dasilva MD

## 2022-12-13 NOTE — PROGRESS NOTES
[facility]    Transplant Infectious Diseases Outpatient Consultation     Patient:  Breta Nava, Date of birth 1965, Medical record number [id number]  Date of Visit:  12/13/2022  Consult requested by Dr. Leventhal for evaluation of CMV colitis.           Recommendations:   1. Hold maribavir.   2. CMV PCR weekly while off of maribavir.   3. Will discuss with hepatology team TAC level.   4. Though needs 23-valent pneumovax and repeat shingrix vaccine, will hold off for now while we hare holding maribavir.   5. Bilateral hip X-ray and ankles X-ray pending PCP and rheumatology evaluation.   6. Right hand wrist sling.     RTC: 1 month, virtual or in-person.         Summary of Presentation:   Transplants:  11/18/2021 (Liver), Postoperative day:  390     This patient is a 56 year old female with PBC-liver cirrhosis s/p OLT in 11/2021. Was on TAC/prednisone.   ?rejection 5/2022, MMF was added to TAC/prednisone.    IS deescalated to TAC/prednisone in 6/2022 due to CMV infection then TAC due to recurrent resistant CMV viremia.   History of UC with screening colonoscopy showing an evidence of CMV colitis. CMV PCR checked and was at 4.8 log 6/2022.         Active Problems and Infectious Diseases Issues:   1. Primary, donor-derived CMV viremia and colitis.   This followed an escalation of IS due to mild rejection. MMF was discontinued.   VGCV induction therapy was started on 6/15/2022 and increased to 1350 mg bid due to slow response (1.3 log decline over 3 weeks). The CMV continued to decline but did not go below 2.1 log. 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    Maribavir was started on 8/16/2022 with adequate response with declining VL to <2.1. However, viremia recurred only one week after stopping maribavir and initiating letermovir suppressive therapy.   Maribavir was resumed around 9/15/2022, again with adequate response.   In an attempt to check if we can try  to discontinue maribavir again, CMV-specific CD4 and CD8 were checked on 10/7/2022; the results showed significantly low CD4 and reasonable (on the low side CD8).     The only way to check if the patient is ready to come off of maribavir is either to continue to check CMV-specific T-cell immunity or a trial of stopping maribavir with weekly CMV PCR.   Will hold maribavir and check CMV PCR weekly for now. Will confirm with hepatology the TAC dosing as the target levels should be between 3-5 but hers were always >5.     Of note, colonoscopy was repeated 10/31/2022 with resolution of the ileal ulcer and no CMV on random biopsies.     2. Arthralgia  Pending rheumatology evaluation, will check X-ray.     3. Right hand carpal tunnel syndrome.   Recommended sling for the right wrist.         Old Problems and Infectious Diseases Issues:   1. SBP and BSI with S mitis 4/29/2021; on IV ABx since 4/29/2021 for possible association with liver abscess.    2. Indeterminate QuantiFeron due to low reaction to mitogen likely due to the patient's underlying liver disease. This patient is low risk for TB, CXR unremarkable; no further evaluations were deemed necessary.     Other Infectious Disease issues include:  - QTc: 465 as of 11/2021.   - PCP prophylaxis: bactrim until 4/24/2022 (5-6 months).   - Serostatus: CMV D+/R-, EBV D+/R+, HSV1?/2?, VZV ?. Currently on maribavir.    - Immunization status: This patient received the fifth dose (second booster) of the COVID-19 vaccine on 10/12/2022. Otherwise she needs to repeat the shingrix vaccine series since she only received one dose on 6/21/2021. Will need 23-valent pneumonia vaccine. I would not like to make to many changes that would affect the CMV. So I will hold off on these vaccinations for now.   - Gamma globulin status: >1000 as of 10/12/2022.       Attestation:  Total duration of visit including chart review, reviewing labs and imaging, interviewing and examining the patient,  documentation, and sending communication to the patient and to the primary treating team, all at the same day of this encounter, is: 60 minutes.   Forest Dasilva MD    Contact information available via [facility] Paging/Directory     12/13/2022           Interim History:   Has been c/o joint pain in hips and ankles bilaterally. Mostly in the morning and gets better during the day. Overall improving but not resolved.   Swollen ankles bilaterally towards the end of the day but not in the morning and without redness.   Also c/o numbness of the right hand at night time.     No fever, no diarrhea, no N/V. She does have dysgeusia with maribavir.         History of the Infectious Disease lllness:       Transplants:  11/18/2021 (Liver); Postoperative day:  390    57 yo female with PBC-liver cirrhosis s/p OLT in 11/2021. Also history of UC for which she undergoes screening colonoscopy.   During the last screening colonoscopy on 6/8/2022, she was found to have a single, 2mm cecal ulcer. The biopsy was c/w CMV infection.   MMF was discontinued.   The patient had no fever, diarrhea, N/V or abdominal pain. She was started on PO VGCV, later CMV VL was 4.8 log. Due to lack of symptoms, PO VGCV was continued. Unfortunately, after initial response, VL persisted at low level and did not respond to high dose VGCV. 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 CMV VL recurred while on suppressive therapy with letermovir. Maribavir was resumed with adequate response. CMV-specific immunity was not adequate enough to stop maribavir.   The patient was asymptomatic all along.     The patient had mild ACR on a liver biopsy 5/2022, MMF was readminister and TAC/prednisone were maintained.     Exposure History:  Lives with her  in a single family house in the [place] area.   Does some gardening but wear gloves.   Traveled to [facility] in the past.   The only international  travel was to [place], to a resort only.   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   Has two grown up children, do not live with her.   Has a dog.          Review of Systems:   As mentioned in the interim history otherwise negative by reviewing constitutional symptoms, central and peripheral neurological systems, respiratory system, cardiac system, GI system,  system, musculoskeletal, skin, allergy, and lymphatics.           Immunizations:     Immunization History   Administered Date(s) Administered     COVID-19 Vaccine 18+ (Moderna) 02/25/2021, 03/18/2021, 04/15/2021, 03/21/2022     COVID-19 Vaccine Bivalent Booster 12+ (Pfizer) 10/12/2022     Flu, Unspecified 10/18/2016, 11/01/2017     HepA-Adult 05/12/2021     HepB-Adult 05/11/2021     Influenza (IIV3) PF 11/10/2010     Influenza Vaccine 50-64 or 18-64 w/egg allergy (Flublok) 10/12/2022     Influenza Vaccine >6 months (Alfuria,Fluzone) 09/03/2015, 12/30/2019, 09/15/2020     Influenza Vaccine, 6+MO IM (QUADRIVALENT W/PRESERVATIVES) 10/17/2018     Pneumo Conj 13-V (2010&after) 06/12/2021     Td (Adult), Adsorbed 03/06/1995, 05/27/2005     Tdap (Adacel,Boostrix) 07/01/2014     Twinrix A/B 06/12/2021     Zoster vaccine recombinant adjuvanted (SHINGRIX) 06/21/2021             Allergies:     Allergies   Allergen Reactions     Diagnostic X-Ray Materials Hives     PATIENT HAD HIVE REACTION AFTER ADMINISTERING CT CONTRAST DYE.       Contrast Dye              Medications:     Current Outpatient Medications   Medication Sig     amLODIPine (NORVASC) 5 MG tablet TAKE 2 TABLETS(10 MG) BY MOUTH DAILY     maribavir (LIVTENCITY) 200 MG tablet Take 2 tablets (400 mg) by mouth 2 times daily     multivitamin (CENTRUM SILVER) tablet Take 1 tablet by mouth daily     tacrolimus (GENERIC EQUIVALENT) 1 MG capsule Take 2 capsules (2 mg) by mouth every 12 hours     No current facility-administered medications for this visit.            Physical Exam:   No vitals are available during this virtual visit.    Constitutional: awake, alert, cooperative, no apparent distress and appears at stated age, well nourished.            Laboratory Data:     No results found for: ACD4    Inflammatory Markers    Recent Labs   Lab Test 11/17/21  1908 08/19/21  0018 06/08/21  1602 06/01/21  0915 05/25/21  1455 05/21/21  1500 05/18/21  1010   CRP 54.0* 40.0* 23.1* 16.1* 14.3* 16.1* 21.4*       Immune Globulin Studies      Recent Labs   Lab Test 10/12/22  0758   IGG 1,145       Metabolic Studies    Recent Labs   Lab Test 12/05/22  0910 11/09/22  0740 10/05/22  0749 09/07/22  0745 08/10/22  0740 08/01/22  1653 07/27/22  0756 12/06/21  1031 12/03/21  1145 11/23/21  0551 11/23/21  0538 11/19/21  0127 11/19/21  0126    142 142 140 142 142 139   < > 142   < >  --    < >  --    POTASSIUM 4.6 4.2 4.3 3.8 3.5 3.8 4.1   < > 4.4   < >  --    < >  --    CHLORIDE 107 112* 113* 112* 111* 111* 111*   < > 113*   < >  --    < >  --    CO2 23 24 21 22 23 22 21   < > 23   < >  --    < >  --    ANIONGAP 11 6 8 6 8 9 7   < > 6   < >  --    < >  --    BUN 18.2 18 22 21 24 31* 23   < > 24   < >  --    < >  --    CR 0.85 0.76 0.81 0.83 0.85 0.81 0.68   < > 0.65   < >  --    < >  --    GFRESTIMATED 78 90 83 81 79 84 >90   < > >90   < >  --    < >  --    GLC 91 92 100* 93 91 93 84   < > 97   < >  --    < >  --    A1C  --   --   --   --   --   --   --   --   --   --   --   --  5.4   MARIELENA 9.9 9.6 9.4 9.0 8.8 9.1 8.7   < > 8.4*   < >  --    < >  --    PHOS 4.0 4.3 4.4 4.0 3.3  --  3.3   < > 3.5   < > 1.9*   < >  --    MAG  --   --   --   --  1.8  --  2.0   < > 1.7   < > 2.2   < >  --    LACT  --   --   --   --   --   --   --   --  0.6*  --  0.8   < >  --     < > = values in this interval not displayed.       Hepatic Studies    Recent Labs   Lab Test 12/05/22  0910 11/09/22  0740 10/05/22  0749 09/07/22  0745 08/10/22  0740 08/01/22  1653   BILITOTAL 0.5 0.3 0.6 0.6 0.6 0.4   ALKPHOS 58 54 66 50 49 52   PROTTOTAL 7.4 7.5 7.7 7.1 6.9 7.4   ALBUMIN 4.3 3.8 3.7  3.6 3.6 3.9   AST 18 15 18 25 21 18   ALT 11 18 17 32 30 28       Hematology Studies     Recent Labs   Lab Test 12/05/22  0910 11/09/22  0740 10/12/22  0758 09/07/22  0745 08/10/22  0740 08/01/22  1653 07/27/22  0756 07/20/22  0805 07/13/22  0758 07/06/22  0801 06/21/21  0938 06/12/21  1259   WBC 6.1 6.0 4.4 4.5 2.2* 2.3* 2.0*   < > 2.3* 2.2*   < > 21.3*   ANEU  --   --   --   --  0.9* 1.4* 1.1*  --  1.4* 1.2*  --  18.5*   ALYM  --   --   --   --  1.2 0.9 0.9  --  0.9 0.9  --  1.1   BRIGIDO  --   --   --   --  0.1 0.0 0.0  --  0.0 0.1  --  1.5*   AEOS  --   --   --   --  0.0 0.0 0.0  --  0.0 0.0  --  0.0   HGB 11.7 11.5* 10.4* 11.7 12.7 12.0 12.4   < > 12.9 12.1   < > 10.3*   HCT 34.0* 33.4* 29.9* 33.6* 37.9 36.2 36.8   < > 38.9 37.9   < > 32.3*    231 181 156 171 156 175   < > 218 204   < > 291    < > = values in this interval not displayed.       Clotting Studies    Recent Labs   Lab Test 05/17/22  1011 12/13/21  1022 12/03/21  1145 11/22/21  0419 11/21/21  0318 11/20/21  0508   INR 1.09 1.12 1.13 1.22* 1.34*  --    PTT  --   --  36 29 31 33       Urine Studies    Recent Labs   Lab Test 07/27/22  0803 12/03/21  1342 11/24/21 2014 11/20/21  1030 11/06/21  0509 10/25/21  1339 09/01/21  1757   URINEPH 7.0 6.0 5.0 5.0 5.5 5.5 5.5   NITRITE Negative Negative Negative Negative Negative Negative Negative   LEUKEST Negative Negative Negative Negative Negative Negative Negative   WBCU  --  1  --  3 1 1 2         Microbiology:  Last 6 Culture results with specimen source  Culture Micro   Date Value Ref Range Status   06/10/2021 No growth  Final   06/10/2021 Canceled, Test credited  Final   06/10/2021 Duplicate request  Final   06/10/2021   Final    Notification of test cancellation was given to  Christine Torres RN from 7[facility]. 6/10/21 at 0812.TV.     06/10/2021 No growth  Final   06/09/2021 No growth  Final    Specimen Description   Date Value Ref Range Status   06/10/2021 Ascites Fluid  Final   06/10/2021 Ascites Fluid   Final   06/10/2021 Midstream Urine  Final   06/10/2021 Midstream Urine  Final   06/09/2021 Blood Left Arm  Final   06/09/2021 Blood PICC  Final      No results found for: CMV    Last check of C difficile  C Diff Toxin B PCR   Date Value Ref Range Status   05/16/2018 Negative NEG^Negative Final     Comment:     Negative: Clostridium difficile target DNA sequences NOT detected, presumed   negative for Clostridium difficile toxin B or the number of bacteria present   may be below the limit of detection for the test.  FDA approved assay performed using [employer] real-time PCR.  A negative result does not exclude actual disease due to Clostridium difficile   and may be due to improper collection, handling and storage of the specimen   or the number of organisms in the specimen is below the detection limit of the   assay.       C Difficile Toxin B by PCR   Date Value Ref Range Status   12/03/2021 Negative Negative Final     Comment:     A negative result does not exclude actual disease due to C. difficile and may be due to improper collection, handling and storage of the specimen or the number of organisms in the specimen is below the detection limit of the assay.         Virology:  CMV viral loads    CMV viral loads    CMV DNA IU/mL   Date Value Ref Range Status   12/05/2022 <137 (A) Not Detected IU/mL Final     Comment:     CMV DNA detected, less than 137 IU/mL     CMV DNA IU/mL, Instrument   Date Value Ref Range Status   09/14/2022 1,177 (H) <1 IU/mL Final   08/24/2022 181 (H) <1 IU/mL Final   08/17/2022 244 (H) <1 IU/mL Final   08/10/2022 467 (H) <1 IU/mL Final   08/03/2022 211 (H) <1 IU/mL Final   07/27/2022 206 (H) <1 IU/mL Final   07/20/2022 259 (H) <1 IU/mL Final   07/13/2022 932 (H) <1 IU/mL Final   07/06/2022 3,537 (H) <1 IU/mL Final     CMV viral loads    Recent Labs   Lab Test 12/05/22  0910 09/21/22  0758 09/14/22  0747 08/31/22  0748 08/24/22  0740 08/17/22  0750 08/10/22  0740 08/03/22  0753  07/27/22  0756 07/20/22  0805 07/13/22  0758 07/06/22  0801   CMVQNT <137*   < >  --    < >  --   --   --   --   --   --   --   --    CMVRESINST  --   --  1,177*  --  181* 244* 467* 211* 206* 259* 932* 3,537*   CMVLOG <2.1   < > 3.1   < > 2.3 2.4 2.7 2.3 2.3 2.4 3.0 3.5    < > = values in this interval not displayed.       CMV viral loads    CMV DNA IU/mL   Date Value Ref Range Status   12/05/2022 <137 (A) Not Detected IU/mL Final     Comment:     CMV DNA detected, less than 137 IU/mL   11/23/2022 <137 (A) Not Detected IU/mL Final     Comment:     CMV DNA detected, less than 137 IU/mL   11/09/2022 Not Detected Not Detected IU/mL Final   10/26/2022 Not Detected Not Detected IU/mL Final   10/12/2022 <137 (A) Not Detected IU/mL Final     Comment:     CMV DNA detected, less than 137 IU/mL   10/05/2022 <137 (A) Not Detected IU/mL Final     Comment:     CMV DNA detected, less than 137 IU/mL   09/28/2022 <137 (A) Not Detected IU/mL Final     Comment:     CMV DNA detected, less than 137 IU/mL   09/21/2022 <137 (A) Not Detected IU/mL Final     Comment:     CMV DNA detected, less than 137 IU/mL   09/07/2022 <137 (A) Not Detected IU/mL Final     Comment:     CMV DNA detected, less than 137 IU/mL     CMV DNA IU/mL, Instrument   Date Value Ref Range Status   09/14/2022 1,177 (H) <1 IU/mL Final   08/24/2022 181 (H) <1 IU/mL Final   08/17/2022 244 (H) <1 IU/mL Final   08/10/2022 467 (H) <1 IU/mL Final   08/03/2022 211 (H) <1 IU/mL Final   07/27/2022 206 (H) <1 IU/mL Final   07/20/2022 259 (H) <1 IU/mL Final   07/13/2022 932 (H) <1 IU/mL Final   07/06/2022 3,537 (H) <1 IU/mL Final     CMV log   Date Value Ref Range Status   12/05/2022 <2.1  Final   11/23/2022 <2.1  Final   10/12/2022 <2.1  Final   10/05/2022 <2.1  Final   09/28/2022 <2.1  Final   09/21/2022 <2.1  Final   09/14/2022 3.1  Final   09/07/2022 <2.1  Final   08/31/2022 <2.1  Final   08/24/2022 2.3  Final   08/17/2022 2.4  Final   08/10/2022 2.7  Final   08/03/2022 2.3   Final   07/27/2022 2.3  Final   07/20/2022 2.4  Final   07/13/2022 3.0  Final   07/06/2022 3.5  Final   06/29/2022 3.9  Final   06/22/2022 4.4  Final   06/14/2022 4.8  Final       CMV resistance testing  Recent Labs   Lab Test 07/27/22  0759   CMVCID Sensitive   CMVFOS Sensitive   CMVGAN Resistant     CMV Cidofovir Resist   Date Value Ref Range Status   07/27/2022 Sensitive  Final     CMV Foscarnet Resist   Date Value Ref Range Status   07/27/2022 Sensitive  Final     CMV Ganciclovir Resis   Date Value Ref Range Status   07/27/2022 Resistant  Final     Comment:       The following UL54 mutations were detected: None  The following UL97 mutations were detected: C603W  INTERPRETIVE INFORMATION: CMV Antiviral Resistance    Codons 457-630 of the UL97 gene and codons 393-1000 of the   UL54 gene are sequenced. Mutations associated with   resistance to ganciclovir, cidofovir, and foscarnet are   reported. Mutations in viral sub-populations below 20   percent of total may not be detected.    This test was developed and its performance characteristics   determined by [facility] It has not been cleared or   approved by the US Food and Drug Administration. This test   was performed in a CLIA certified laboratory and is   intended for clinical purposes.  Performed By: [facility]  [address] [place] [id number]  : Ariel Frank MD, PhD        EBV viral loads   No lab results found.  No results found for: EBVDN, EBRES, EBVDN, EBVSP, EBVPC, EBVPCR    Human Herpes Virus 6 viral loads    No results found for: H6RES No results found for: H6SPEC    CMV Antibody IgG   Date Value Ref Range Status   11/17/2021 No detectable antibody. No detectable antibody.  Final   08/18/2021 No detectable antibody. No detectable antibody.  Final     CMV Antibody IgM   Date Value Ref Range Status   11/17/2021 Negative Negative Final   08/18/2021 Negative Negative Final     No results found for: EBIG2,  EBIGM, EBVIGG, EBIGG, EBVAGN, NJ0235, TOXG      Pathology:  Colon Bx 10/31/2022  Addendum   CMV immunohistochemical stains performed on parts C. - F.  are negative.  Analyte Specific Reagents (ASRs) are used in many laboratory tests necessary for standard medical care and generally do not require FDA approval. This test was developed and its performance characteristics determined by [facility]. It has not been cleared or approved by the U.S. Food and Drug Administration.  [facility] are certified for the performance of high-complexity clinical testing under the Clinical Laboratory Improvement Amendments of 1988 (CLIA), and in keeping with the certification requirements, the laboratory has verified this test's accuracy, precision and/or validity of the method.      Addendum electronically signed by Jagdish Mccormick DO on 11/2/2022 at  2:45 PM   Final Diagnosis   A.  COLON, ASCENDING, POLYP:  - Colonic mucosa with benign lymphoid aggregates  - No dysplasia or malignancy identified    B.  CECUM, POLYP:  - Colonic mucosa with benign lymphoid aggregates  - No dysplasia or malignancy identified    C.  COLON, CECUM/ASCENDING ASCENDING, BIOPSY:  - Unremarkable colonic mucosa  - No dysplasia or malignancy identified    D.  COLON, TRANSVERSE, BIOPSY:  - Unremarkable colonic mucosa  - No dysplasia or malignancy identified    E.  COLON, DESCENDING/SIGMOID, BIOPSY:  - Unremarkable colonic mucosa  - No dysplasia or malignancy identified    F.  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  CECAL ULCER, BIOPSY :  - Colonic mucosa with ulceration and necroinflammatory debris  - POSITIVE for CMV by immunohistochemistry  - Negative for HSV by immunohistochemistry    B.  CECUM AND ASCENDING COLON, BIOPSIES :  - Colonic mucosa with mild crypt distortion and  mild chronic inflammation (Re grade 1)  - Negative for dysplasia      C. HEPATIC FLEXURE AND TRANSVERSE COLON, BIOPSIES :  - Colonic mucosa with mild crypt distortion no significant inflammation (Re grade 0)  - Negative for dysplasia      D. DISTAL AND TRANSVERSE COLON, BIOPSIES :  - Colonic mucosa with no significant inflammation (Re grade 0)  - Negative for dysplasia     E. DESCENDING COLON, BIOPSIES :  - Colonic mucosa with mild crypt distortion and mild patchy chronic inflammation (Re grade 1)  - Negative for dysplasia      F. RECTAL BIOPSIES :  - Colonic mucosa with no significant inflammation (Re grade 0)  - Negative for dysplasia      Liver biopsy 5/17/2022  Final Diagnosis   A.  LIVER, ALLOGRAFT, NEEDLE CORE BIOPSY:  - Mild focal portal inflammation indeterminate for acute cellular rejection (CHIU: 2-3/9)

## 2022-12-14 ENCOUNTER — TELEPHONE (OUTPATIENT)
Dept: TRANSPLANT | Facility: CLINIC | Age: 57
End: 2022-12-14

## 2022-12-14 DIAGNOSIS — Z94.4 LIVER TRANSPLANTED (H): Primary | ICD-10-CM

## 2022-12-14 NOTE — TELEPHONE ENCOUNTER
Message from Dr. Dasilva:    CMV has been <137, she's been longer than a year since transplant. I will re-attempt to stop maribavir and see if CMV remains <137. She will discontinue maribavir today.   I saw that the new goal for TAC is 3-5 (it was 5-8); I see she's been >5 but close to to 5. Is there a way to keep it <5? I think that will help with CMV.        Call to Berta. She did stop maribavir, she will check CMV PCR weekly. She will do tac level every other week. She is feeling well

## 2022-12-16 ENCOUNTER — TELEPHONE (OUTPATIENT)
Dept: INFECTIOUS DISEASES | Facility: CLINIC | Age: 57
End: 2022-12-16

## 2022-12-16 NOTE — TELEPHONE ENCOUNTER
Scheduled 1 month follow up with Dr. Dasilva. Patient already has lab scheduled   Darling Sher CMA

## 2022-12-19 ENCOUNTER — LAB (OUTPATIENT)
Dept: LAB | Facility: CLINIC | Age: 57
End: 2022-12-19
Payer: COMMERCIAL

## 2022-12-19 DIAGNOSIS — Z94.4 LIVER TRANSPLANTED (H): ICD-10-CM

## 2022-12-19 DIAGNOSIS — Z94.4 S/P LIVER TRANSPLANT (H): ICD-10-CM

## 2022-12-19 DIAGNOSIS — B25.9 CYTOMEGALOVIRUS (CMV) VIREMIA (H): ICD-10-CM

## 2022-12-19 LAB
ALBUMIN SERPL BCG-MCNC: 4.2 G/DL (ref 3.5–5.2)
ALP SERPL-CCNC: 53 U/L (ref 35–104)
ALT SERPL W P-5'-P-CCNC: 16 U/L (ref 10–35)
AST SERPL W P-5'-P-CCNC: 24 U/L (ref 10–35)
BILIRUB DIRECT SERPL-MCNC: <0.2 MG/DL (ref 0–0.3)
BILIRUB SERPL-MCNC: 0.5 MG/DL
CMV DNA SPEC NAA+PROBE-ACNC: <137 IU/ML
CMV DNA SPEC NAA+PROBE-LOG#: <2.1 {LOG_COPIES}/ML
PROT SERPL-MCNC: 7.1 G/DL (ref 6.4–8.3)
TACROLIMUS BLD-MCNC: 4.4 UG/L (ref 5–15)
TME LAST DOSE: ABNORMAL H
TME LAST DOSE: ABNORMAL H

## 2022-12-19 PROCEDURE — 80076 HEPATIC FUNCTION PANEL: CPT

## 2022-12-19 PROCEDURE — 80197 ASSAY OF TACROLIMUS: CPT

## 2022-12-19 PROCEDURE — 36415 COLL VENOUS BLD VENIPUNCTURE: CPT

## 2022-12-27 ENCOUNTER — LAB (OUTPATIENT)
Dept: LAB | Facility: CLINIC | Age: 57
End: 2022-12-27
Payer: COMMERCIAL

## 2022-12-27 DIAGNOSIS — B25.9 CYTOMEGALOVIRUS (CMV) VIREMIA (H): ICD-10-CM

## 2022-12-27 PROCEDURE — 36415 COLL VENOUS BLD VENIPUNCTURE: CPT

## 2022-12-28 ENCOUNTER — MYC MEDICAL ADVICE (OUTPATIENT)
Dept: INFECTIOUS DISEASES | Facility: CLINIC | Age: 57
End: 2022-12-28

## 2022-12-28 DIAGNOSIS — B34.9 INFECTION DUE TO ANTIMICROBIAL RESISTANT VIRUS: ICD-10-CM

## 2022-12-28 DIAGNOSIS — B25.9 CYTOMEGALOVIRUS (CMV) VIREMIA (H): ICD-10-CM

## 2022-12-28 DIAGNOSIS — Z16.33 INFECTION DUE TO ANTIMICROBIAL RESISTANT VIRUS: ICD-10-CM

## 2022-12-28 LAB
CMV DNA IU/ML, INSTRUMENT: 401 IU/ML
CMV DNA SPEC NAA+PROBE-LOG#: 2.6 {LOG_COPIES}/ML

## 2022-12-29 ENCOUNTER — TELEPHONE (OUTPATIENT)
Dept: ONCOLOGY | Facility: CLINIC | Age: 57
End: 2022-12-29

## 2022-12-29 NOTE — TELEPHONE ENCOUNTER
Prior Authorization Approval    Authorization Effective Date: 11/29/2022  Authorization Expiration Date: 2/27/2023  Medication: Livtencity --PA approved through 02/27/23  Approved Dose/Quantity: 200mg -- #112 for 28 days  Reference #: [id number]   Insurance Company:  [employer]  Expected CoPay:     n/a  CoPay Card Available:    n/a  Foundation Assistance Needed:  N/a  Which Pharmacy is filling the prescription (Not needed for infusion/clinic administered):  [facility]  Pharmacy Notified: Yes  Patient Notified: Yes

## 2023-01-03 ENCOUNTER — LAB (OUTPATIENT)
Dept: LAB | Facility: CLINIC | Age: 58
End: 2023-01-03
Payer: COMMERCIAL

## 2023-01-03 DIAGNOSIS — B25.9 CYTOMEGALOVIRUS (CMV) VIREMIA (H): ICD-10-CM

## 2023-01-03 DIAGNOSIS — Z94.4 LIVER TRANSPLANTED (H): ICD-10-CM

## 2023-01-03 LAB
TACROLIMUS BLD-MCNC: 6.1 UG/L (ref 5–15)
TME LAST DOSE: NORMAL H
TME LAST DOSE: NORMAL H

## 2023-01-03 PROCEDURE — 80197 ASSAY OF TACROLIMUS: CPT

## 2023-01-03 PROCEDURE — 36415 COLL VENOUS BLD VENIPUNCTURE: CPT

## 2023-01-04 LAB
CMV DNA SPEC NAA+PROBE-ACNC: <137 IU/ML
CMV DNA SPEC NAA+PROBE-LOG#: <2.1 {LOG_COPIES}/ML

## 2023-01-09 ENCOUNTER — LAB (OUTPATIENT)
Dept: LAB | Facility: CLINIC | Age: 58
End: 2023-01-09
Payer: COMMERCIAL

## 2023-01-09 DIAGNOSIS — B25.9 CYTOMEGALOVIRUS (CMV) VIREMIA (H): ICD-10-CM

## 2023-01-09 PROCEDURE — 36415 COLL VENOUS BLD VENIPUNCTURE: CPT

## 2023-01-11 LAB — CMV DNA SPEC NAA+PROBE-ACNC: NOT DETECTED IU/ML

## 2023-01-17 ENCOUNTER — VIRTUAL VISIT (OUTPATIENT)
Dept: INFECTIOUS DISEASES | Facility: CLINIC | Age: 58
End: 2023-01-17
Attending: INTERNAL MEDICINE
Payer: COMMERCIAL

## 2023-01-17 ENCOUNTER — LAB (OUTPATIENT)
Dept: LAB | Facility: CLINIC | Age: 58
End: 2023-01-17
Payer: COMMERCIAL

## 2023-01-17 DIAGNOSIS — B34.9 INFECTION DUE TO ANTIMICROBIAL RESISTANT VIRUS: ICD-10-CM

## 2023-01-17 DIAGNOSIS — Z94.4 LIVER TRANSPLANTED (H): ICD-10-CM

## 2023-01-17 DIAGNOSIS — B25.9 CYTOMEGALOVIRUS (CMV) VIREMIA (H): ICD-10-CM

## 2023-01-17 DIAGNOSIS — Z94.4 LIVER REPLACED BY TRANSPLANT (H): ICD-10-CM

## 2023-01-17 DIAGNOSIS — B25.9 CYTOMEGALOVIRUS (CMV) VIREMIA (H): Primary | ICD-10-CM

## 2023-01-17 DIAGNOSIS — A08.39 CMV COLITIS (H): ICD-10-CM

## 2023-01-17 DIAGNOSIS — Z16.33 INFECTION DUE TO ANTIMICROBIAL RESISTANT VIRUS: ICD-10-CM

## 2023-01-17 DIAGNOSIS — B25.9 CMV COLITIS (H): ICD-10-CM

## 2023-01-17 LAB
TACROLIMUS BLD-MCNC: 6.1 UG/L (ref 5–15)
TME LAST DOSE: NORMAL H
TME LAST DOSE: NORMAL H

## 2023-01-17 PROCEDURE — 80197 ASSAY OF TACROLIMUS: CPT

## 2023-01-17 PROCEDURE — 36415 COLL VENOUS BLD VENIPUNCTURE: CPT

## 2023-01-17 PROCEDURE — 99215 OFFICE O/P EST HI 40 MIN: CPT | Mod: GT | Performed by: INTERNAL MEDICINE

## 2023-01-17 NOTE — PATIENT INSTRUCTIONS
Mrs. Nava,   Continue maribavir 400 mg twice a day.   Continue to check CMV PCR weekly.   Proceed to take the second dose of shingrix vaccine (the NON-live shingles vaccine) and 23-valent pneumovax (also called pneumovax).   Please call [phone] to schedule an appointment with me in one month or 6 weeks in-person or virtual.    Thank you,  Forest Dasilva MD

## 2023-01-17 NOTE — PROGRESS NOTES
Berta is a 57 year old who is being evaluated via a billable video visit.      How would you like to obtain your AVS? MyChart  If the video visit is dropped, the invitation should be resent by: Text to cell phone: [phone]  Will anyone else be joining your video visit? No      Video-Visit Details    Type of service:  Video Visit   Start time: 4:15 pm  End time:5:33 pm  Originating Location (pt. Location): Home  Distant Location (provider location):  On-site  Platform used for Video Visit: Patricia

## 2023-01-17 NOTE — LETTER
1/17/2023       RE: Berta Nava  [address] [facility] [id number]     Dear Colleague,    Thank you for referring your patient, Berta Nava, to the [facility] [place] at [facility]. Please see a copy of my visit note below.    [facility]    Transplant Infectious Diseases Outpatient Consultation     Patient:  Berta Nava, Date of birth 1965, Medical record number [id number]  Date of Visit:  12/13/2022  Consult requested by Dr. Leventhal for evaluation of CMV colitis.           Recommendations:   1. Continue maribavir 400 mg bid.   2. Will implore transplant team to further decrease IS to be able to come off of maribavir.   3. Continue to check CMV PCR weekly.   4. Proceed to take the second dose of shingrix vaccine and 23-valent pneumovax.     RTC: 1 month, virtual or in-person.         Summary of Presentation:   Transplants:  11/18/2021 (Liver), Postoperative day:  390     This patient is a 56 year old female with PBC-liver cirrhosis s/p OLT in 11/2021. Was on TAC/prednisone.   ?rejection 5/2022, MMF was added to TAC/prednisone.    IS deescalated to TAC/prednisone in 6/2022 due to CMV infection then TAC due to recurrent resistant CMV viremia.   History of UC with screening colonoscopy showing an evidence of CMV colitis. CMV PCR checked and was at 4.8 log 6/2022.         Active Problems and Infectious Diseases Issues:   1. Recurrent CMV viremia.   Started as a primary, donor-derived CMV viremia and colitis following an escalation of IS due to mild rejection. MMF was later discontinued.   CMV viremia would recur very shortly after stopping maribavir and while on letermovir.     CMV-specific CD4 and CD8 were checked on 10/7/2022; the results showed significantly low CD4 and reasonable (on the low side) CD8. An attempt to stop maribavir failed.     I do not believe we are going to be  able to get the patient off of maribavir while on the current dose of TAC.     Of note, colonoscopy was repeated 10/31/2022 with resolution of the ileal ulcer and no CMV on random biopsies.         Old Problems and Infectious Diseases Issues:   1. SBP and BSI with S mitis 4/29/2021; on IV ABx since 4/29/2021 for possible association with liver abscess.    2. Indeterminate QuantiFeron due to low reaction to mitogen likely due to the patient's underlying liver disease. This patient is low risk for TB, CXR unremarkable; no further evaluations were deemed necessary.     Other Infectious Disease issues include:  - QTc: 465 as of 11/2021.   - PCP prophylaxis: bactrim until 4/24/2022 (5-6 months).   - Serostatus: CMV D+/R-, EBV D+/R+, HSV1?/2?, VZV ?. Currently on maribavir.    - Immunization status: This patient received the fifth dose (second booster, bivalent) of the COVID-19 vaccine on 10/12/2022. Otherwise she needs to repeat the shingrix vaccine series since she only received one dose on 6/21/2021. Will need 23-valent pneumonia vaccine. I would not like to make to many changes that would affect the CMV. So I will hold off on these vaccinations for now.   - Gamma globulin status: >1000 as of 10/12/2022.       Attestation:  Total duration of visit including chart review, reviewing labs and imaging, interviewing and examining the patient, documentation, and sending communication to the patient and to the primary treating team, all at the same day of this encounter, is: 40 minutes.   Forest Dasilva MD    Contact information available via [facility] Paging/Directory     12/13/2022           Interim History:   Joints pain have improved and CTS pain and numbness improved with slings bilaterally at night.     No fever, no diarrhea, no N/V. She continues to have dysgeusia with maribavir.         History of the Infectious Disease lllness:       Transplants:  11/18/2021 (Liver); Postoperative day:  390    55 yo female with  PBC-liver cirrhosis s/p OLT in 11/2021. The patient had mild ACR on a liver biopsy 5/2022, MMF was readminister and TAC/prednisone were maintained.   Also history of UC for which she undergoes screening colonoscopy.   During the last screening colonoscopy on 6/8/2022, she was found to have a single, 2mm cecal ulcer. The biopsy was c/w CMV infection.   MMF was discontinued.   The patient had no fever, diarrhea, N/V or abdominal pain. She was started on PO VGCV, later CMV VL was 4.8 log. Due to lack of symptoms, PO VGCV was continued. Unfortunately, after initial response, VL persisted at low level and did not respond to high dose VGCV. 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 CMV VL recurred while on suppressive therapy with letermovir. Maribavir was resumed with adequate response. CMV-specific immunity was not adequate enough to stop maribavir, yet, an attempt to discontinue maribavir was still done and failed with rising CMV VL within one week of stopping maribavir. Maribavir was again resumed 12/2022.   The patient was asymptomatic all along.     Exposure History:  Lives with her  in a single family house in the [place] area.   Does some gardening but wear gloves.   Traveled to [facility] in the past.   The only international travel was to [place], to a resort only.   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   Has two grown up children, do not live with her.   Has a dog.          Review of Systems:   As mentioned in the interim history otherwise negative by reviewing constitutional symptoms, central and peripheral neurological systems, respiratory system, cardiac system, GI system,  system, musculoskeletal, skin, allergy, and lymphatics.           Immunizations:     Immunization History   Administered Date(s) Administered     COVID-19 Vaccine 18+ (Moderna) 02/25/2021, 03/18/2021, 04/15/2021, 03/21/2022     COVID-19 Vaccine Bivalent Booster 12+ (Pfizer)  10/12/2022     Flu, Unspecified 10/18/2016, 11/01/2017     HepA-Adult 05/12/2021     HepB-Adult 05/11/2021     Influenza (IIV3) PF 11/10/2010     Influenza Vaccine 50-64 or 18-64 w/egg allergy (Flublok) 10/12/2022     Influenza Vaccine >6 months (Alfuria,Fluzone) 09/03/2015, 12/30/2019, 09/15/2020     Influenza Vaccine, 6+MO IM (QUADRIVALENT W/PRESERVATIVES) 10/17/2018     Pneumo Conj 13-V (2010&after) 06/12/2021     Td (Adult), Adsorbed 03/06/1995, 05/27/2005     Tdap (Adacel,Boostrix) 07/01/2014     Twinrix A/B 06/12/2021     Zoster vaccine recombinant adjuvanted (SHINGRIX) 06/21/2021             Allergies:     Allergies   Allergen Reactions     Diagnostic X-Ray Materials Hives     PATIENT HAD HIVE REACTION AFTER ADMINISTERING CT CONTRAST DYE.       Contrast Dye              Medications:     Current Outpatient Medications   Medication Sig     amLODIPine (NORVASC) 5 MG tablet TAKE 2 TABLETS(10 MG) BY MOUTH DAILY     maribavir (LIVTENCITY) 200 MG tablet Take 2 tablets (400 mg) by mouth 2 times daily     multivitamin (CENTRUM SILVER) tablet Take 1 tablet by mouth daily     tacrolimus (GENERIC EQUIVALENT) 1 MG capsule Take 2 capsules (2 mg) by mouth every 12 hours     No current facility-administered medications for this visit.            Physical Exam:   No vitals are available during this virtual visit.   Constitutional: awake, alert, cooperative, no apparent distress and appears at stated age, well nourished.            Laboratory Data:     No results found for: ACD4    Inflammatory Markers    Recent Labs   Lab Test 11/17/21  1908 08/19/21  0018 06/08/21  1602 06/01/21  0915 05/25/21  1455 05/21/21  1500 05/18/21  1010   CRP 54.0* 40.0* 23.1* 16.1* 14.3* 16.1* 21.4*       Immune Globulin Studies      Recent Labs   Lab Test 10/12/22  0758   IGG 1,145       Metabolic Studies    Recent Labs   Lab Test 12/05/22  0910 11/09/22  0740 10/05/22  0749 09/07/22  0745 08/10/22  0740 08/01/22  1653 07/27/22  0756  12/06/21  1031 12/03/21  1145 11/23/21  0551 11/23/21  0538 11/19/21  0127 11/19/21  0126    142 142 140 142 142 139   < > 142   < >  --    < >  --    POTASSIUM 4.6 4.2 4.3 3.8 3.5 3.8 4.1   < > 4.4   < >  --    < >  --    CHLORIDE 107 112* 113* 112* 111* 111* 111*   < > 113*   < >  --    < >  --    CO2 23 24 21 22 23 22 21   < > 23   < >  --    < >  --    ANIONGAP 11 6 8 6 8 9 7   < > 6   < >  --    < >  --    BUN 18.2 18 22 21 24 31* 23   < > 24   < >  --    < >  --    CR 0.85 0.76 0.81 0.83 0.85 0.81 0.68   < > 0.65   < >  --    < >  --    GFRESTIMATED 78 90 83 81 79 84 >90   < > >90   < >  --    < >  --    GLC 91 92 100* 93 91 93 84   < > 97   < >  --    < >  --    A1C  --   --   --   --   --   --   --   --   --   --   --   --  5.4   MARIELENA 9.9 9.6 9.4 9.0 8.8 9.1 8.7   < > 8.4*   < >  --    < >  --    PHOS 4.0 4.3 4.4 4.0 3.3  --  3.3   < > 3.5   < > 1.9*   < >  --    MAG  --   --   --   --  1.8  --  2.0   < > 1.7   < > 2.2   < >  --    LACT  --   --   --   --   --   --   --   --  0.6*  --  0.8   < >  --     < > = values in this interval not displayed.       Hepatic Studies    Recent Labs   Lab Test 12/05/22 0910 11/09/22  0740 10/05/22  0749 09/07/22  0745 08/10/22  0740 08/01/22  1653   BILITOTAL 0.5 0.3 0.6 0.6 0.6 0.4   ALKPHOS 58 54 66 50 49 52   PROTTOTAL 7.4 7.5 7.7 7.1 6.9 7.4   ALBUMIN 4.3 3.8 3.7 3.6 3.6 3.9   AST 18 15 18 25 21 18   ALT 11 18 17 32 30 28       Hematology Studies     Recent Labs   Lab Test 12/05/22  0910 11/09/22  0740 10/12/22  0758 09/07/22  0745 08/10/22  0740 08/01/22  1653 07/27/22  0756 07/20/22  0805 07/13/22  0758 07/06/22  0801 06/21/21  0938 06/12/21  1259   WBC 6.1 6.0 4.4 4.5 2.2* 2.3* 2.0*   < > 2.3* 2.2*   < > 21.3*   ANEU  --   --   --   --  0.9* 1.4* 1.1*  --  1.4* 1.2*  --  18.5*   ALYM  --   --   --   --  1.2 0.9 0.9  --  0.9 0.9  --  1.1   BRIGIDO  --   --   --   --  0.1 0.0 0.0  --  0.0 0.1  --  1.5*   AEOS  --   --   --   --  0.0 0.0 0.0  --  0.0 0.0  --  0.0    HGB 11.7 11.5* 10.4* 11.7 12.7 12.0 12.4   < > 12.9 12.1   < > 10.3*   HCT 34.0* 33.4* 29.9* 33.6* 37.9 36.2 36.8   < > 38.9 37.9   < > 32.3*    231 181 156 171 156 175   < > 218 204   < > 291    < > = values in this interval not displayed.       Clotting Studies    Recent Labs   Lab Test 05/17/22  1011 12/13/21  1022 12/03/21  1145 11/22/21  0419 11/21/21  0318 11/20/21  0508   INR 1.09 1.12 1.13 1.22* 1.34*  --    PTT  --   --  36 29 31 33       Urine Studies    Recent Labs   Lab Test 07/27/22  0803 12/03/21  1342 11/24/21 2014 11/20/21  1030 11/06/21  0509 10/25/21  1339 09/01/21  1757   URINEPH 7.0 6.0 5.0 5.0 5.5 5.5 5.5   NITRITE Negative Negative Negative Negative Negative Negative Negative   LEUKEST Negative Negative Negative Negative Negative Negative Negative   WBCU  --  1  --  3 1 1 2         Microbiology:  Last 6 Culture results with specimen source  Culture Micro   Date Value Ref Range Status   06/10/2021 No growth  Final   06/10/2021 Canceled, Test credited  Final   06/10/2021 Duplicate request  Final   06/10/2021   Final    Notification of test cancellation was given to  Christine Torres RN from 7C. 6/10/21 at 0812.TV.     06/10/2021 No growth  Final   06/09/2021 No growth  Final    Specimen Description   Date Value Ref Range Status   06/10/2021 Ascites Fluid  Final   06/10/2021 Ascites Fluid  Final   06/10/2021 Midstream Urine  Final   06/10/2021 Midstream Urine  Final   06/09/2021 Blood Left Arm  Final   06/09/2021 Blood PICC  Final      No results found for: CMV    Last check of C difficile  C Diff Toxin B PCR   Date Value Ref Range Status   05/16/2018 Negative NEG^Negative Final     Comment:     Negative: Clostridium difficile target DNA sequences NOT detected, presumed   negative for Clostridium difficile toxin B or the number of bacteria present   may be below the limit of detection for the test.  FDA approved assay performed using [employer] GeneXpert real-time PCR.  A negative result does  not exclude actual disease due to Clostridium difficile   and may be due to improper collection, handling and storage of the specimen   or the number of organisms in the specimen is below the detection limit of the   assay.       C Difficile Toxin B by PCR   Date Value Ref Range Status   12/03/2021 Negative Negative Final     Comment:     A negative result does not exclude actual disease due to C. difficile and may be due to improper collection, handling and storage of the specimen or the number of organisms in the specimen is below the detection limit of the assay.         Virology:  CMV viral loads    CMV viral loads    CMV DNA IU/mL   Date Value Ref Range Status   12/05/2022 <137 (A) Not Detected IU/mL Final     Comment:     CMV DNA detected, less than 137 IU/mL     CMV DNA IU/mL, Instrument   Date Value Ref Range Status   09/14/2022 1,177 (H) <1 IU/mL Final   08/24/2022 181 (H) <1 IU/mL Final   08/17/2022 244 (H) <1 IU/mL Final   08/10/2022 467 (H) <1 IU/mL Final   08/03/2022 211 (H) <1 IU/mL Final   07/27/2022 206 (H) <1 IU/mL Final   07/20/2022 259 (H) <1 IU/mL Final   07/13/2022 932 (H) <1 IU/mL Final   07/06/2022 3,537 (H) <1 IU/mL Final     CMV viral loads    Recent Labs   Lab Test 12/05/22  0910 09/21/22  0758 09/14/22  0747 08/31/22  0748 08/24/22  0740 08/17/22  0750 08/10/22  0740 08/03/22  0753 07/27/22  0756 07/20/22  0805 07/13/22  0758 07/06/22  0801   CMVQNT <137*   < >  --    < >  --   --   --   --   --   --   --   --    CMVRESINST  --   --  1,177*  --  181* 244* 467* 211* 206* 259* 932* 3,537*   CMVLOG <2.1   < > 3.1   < > 2.3 2.4 2.7 2.3 2.3 2.4 3.0 3.5    < > = values in this interval not displayed.       CMV viral loads    CMV DNA IU/mL   Date Value Ref Range Status   12/05/2022 <137 (A) Not Detected IU/mL Final     Comment:     CMV DNA detected, less than 137 IU/mL   11/23/2022 <137 (A) Not Detected IU/mL Final     Comment:     CMV DNA detected, less than 137 IU/mL   11/09/2022 Not Detected  Not Detected IU/mL Final   10/26/2022 Not Detected Not Detected IU/mL Final   10/12/2022 <137 (A) Not Detected IU/mL Final     Comment:     CMV DNA detected, less than 137 IU/mL   10/05/2022 <137 (A) Not Detected IU/mL Final     Comment:     CMV DNA detected, less than 137 IU/mL   09/28/2022 <137 (A) Not Detected IU/mL Final     Comment:     CMV DNA detected, less than 137 IU/mL   09/21/2022 <137 (A) Not Detected IU/mL Final     Comment:     CMV DNA detected, less than 137 IU/mL   09/07/2022 <137 (A) Not Detected IU/mL Final     Comment:     CMV DNA detected, less than 137 IU/mL     CMV DNA IU/mL, Instrument   Date Value Ref Range Status   09/14/2022 1,177 (H) <1 IU/mL Final   08/24/2022 181 (H) <1 IU/mL Final   08/17/2022 244 (H) <1 IU/mL Final   08/10/2022 467 (H) <1 IU/mL Final   08/03/2022 211 (H) <1 IU/mL Final   07/27/2022 206 (H) <1 IU/mL Final   07/20/2022 259 (H) <1 IU/mL Final   07/13/2022 932 (H) <1 IU/mL Final   07/06/2022 3,537 (H) <1 IU/mL Final     CMV log   Date Value Ref Range Status   12/05/2022 <2.1  Final   11/23/2022 <2.1  Final   10/12/2022 <2.1  Final   10/05/2022 <2.1  Final   09/28/2022 <2.1  Final   09/21/2022 <2.1  Final   09/14/2022 3.1  Final   09/07/2022 <2.1  Final   08/31/2022 <2.1  Final   08/24/2022 2.3  Final   08/17/2022 2.4  Final   08/10/2022 2.7  Final   08/03/2022 2.3  Final   07/27/2022 2.3  Final   07/20/2022 2.4  Final   07/13/2022 3.0  Final   07/06/2022 3.5  Final   06/29/2022 3.9  Final   06/22/2022 4.4  Final   06/14/2022 4.8  Final       CMV resistance testing  Recent Labs   Lab Test 07/27/22  0759   CMVCID Sensitive   CMVFOS Sensitive   CMVGAN Resistant     CMV Cidofovir Resist   Date Value Ref Range Status   07/27/2022 Sensitive  Final     CMV Foscarnet Resist   Date Value Ref Range Status   07/27/2022 Sensitive  Final     CMV Ganciclovir Resis   Date Value Ref Range Status   07/27/2022 Resistant  Final     Comment:       The following UL54 mutations were detected:  None  The following UL97 mutations were detected: [id number]  INTERPRETIVE INFORMATION: CMV Antiviral Resistance    Codons 457-630 of the UL97 gene and codons 393-1000 of the   UL54 gene are sequenced. Mutations associated with   resistance to ganciclovir, cidofovir, and foscarnet are   reported. Mutations in viral sub-populations below 20   percent of total may not be detected.    This test was developed and its performance characteristics   determined by [facility]. It has not been cleared or   approved by the US Food and Drug Administration. This test   was performed in a CLIA certified laboratory and is   intended for clinical purposes.  Performed By: [facility]  [address] [place] [id number]  : Ariel Frank MD, PhD        EBV viral loads   No lab results found.  No results found for: EBVDN, EBRES, EBVDN, EBVSP, EBVPC, EBVPCR    Human Herpes Virus 6 viral loads    No results found for: H6RES No results found for: H6SPEC    CMV Antibody IgG   Date Value Ref Range Status   11/17/2021 No detectable antibody. No detectable antibody.  Final   08/18/2021 No detectable antibody. No detectable antibody.  Final     CMV Antibody IgM   Date Value Ref Range Status   11/17/2021 Negative Negative Final   08/18/2021 Negative Negative Final     No results found for: EBIG2, EBIGM, EBVIGG, EBIGG, EBVAGN, QM5814, TOXG      Pathology:  Colon Bx 10/31/2022  Addendum   CMV immunohistochemical stains performed on parts C. - F.  are negative.  Analyte Specific Reagents (ASRs) are used in many laboratory tests necessary for standard medical care and generally do not require FDA approval. This test was developed and its performance characteristics determined by [facility]. It has not been cleared or approved by the U.S. Food and Drug Administration.  [facility] are certified for the performance of high-complexity clinical testing  under the Clinical Laboratory Improvement Amendments of 1988 (CLIA), and in keeping with the certification requirements, the laboratory has verified this test's accuracy, precision and/or validity of the method.      Addendum electronically signed by Jagdish Mccormick DO on 11/2/2022 at  2:45 PM   Final Diagnosis   A.  COLON, ASCENDING, POLYP:  - Colonic mucosa with benign lymphoid aggregates  - No dysplasia or malignancy identified    B.  CECUM, POLYP:  - Colonic mucosa with benign lymphoid aggregates  - No dysplasia or malignancy identified    C.  COLON, CECUM/ASCENDING ASCENDING, BIOPSY:  - Unremarkable colonic mucosa  - No dysplasia or malignancy identified    D.  COLON, TRANSVERSE, BIOPSY:  - Unremarkable colonic mucosa  - No dysplasia or malignancy identified    E.  COLON, DESCENDING/SIGMOID, BIOPSY:  - Unremarkable colonic mucosa  - No dysplasia or malignancy identified    F.  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  CECAL ULCER, BIOPSY :  - Colonic mucosa with ulceration and necroinflammatory debris  - POSITIVE for CMV by immunohistochemistry  - Negative for HSV by immunohistochemistry    B.  CECUM AND ASCENDING COLON, BIOPSIES :  - Colonic mucosa with mild crypt distortion and mild chronic inflammation (Re grade 1)  - Negative for dysplasia      C. HEPATIC FLEXURE AND TRANSVERSE COLON, BIOPSIES :  - Colonic mucosa with mild crypt distortion no significant inflammation (Re grade 0)  - Negative for dysplasia      D. DISTAL AND TRANSVERSE COLON, BIOPSIES :  - Colonic mucosa with no significant inflammation (Re grade 0)  - Negative for dysplasia     E. DESCENDING COLON, BIOPSIES :  - Colonic mucosa with mild crypt distortion and mild patchy chronic inflammation (Re grade 1)  - Negative for dysplasia      F. RECTAL BIOPSIES :  - Colonic mucosa with no significant inflammation  (Re grade 0)  - Negative for dysplasia      Liver biopsy 5/17/2022  Final Diagnosis   A.  LIVER, ALLOGRAFT, NEEDLE CORE BIOPSY:  - Mild focal portal inflammation indeterminate for acute cellular rejection (CHIU: 2-3/9)       Berta is a 57 year old who is being evaluated via a billable video visit.      How would you like to obtain your AVS? MyChart  If the video visit is dropped, the invitation should be resent by: Text to cell phone: [phone]  Will anyone else be joining your video visit? No    Video-Visit Details    Type of service:  Video Visit   Start time: 4:15 pm  End time:5:33 pm  Originating Location (pt. Location): Home  Distant Location (provider location):  On-site  Platform used for Video Visit: Patricia Dasilva MD

## 2023-01-17 NOTE — PROGRESS NOTES
[facility]    Transplant Infectious Diseases Outpatient Consultation     Patient:  Berta Nava, Date of birth 1965, Medical record number [id number]  Date of Visit:  12/13/2022  Consult requested by Dr. Leventhal for evaluation of CMV colitis.           Recommendations:   1. Continue maribavir 400 mg bid.   2. Will implore transplant team to further decrease IS to be able to come off of maribavir.   3. Continue to check CMV PCR weekly.   4. Proceed to take the second dose of shingrix vaccine and 23-valent pneumovax.     RTC: 1 month, virtual or in-person.         Summary of Presentation:   Transplants:  11/18/2021 (Liver), Postoperative day:  390     This patient is a 56 year old female with PBC-liver cirrhosis s/p OLT in 11/2021. Was on TAC/prednisone.   ?rejection 5/2022, MMF was added to TAC/prednisone.    IS deescalated to TAC/prednisone in 6/2022 due to CMV infection then TAC due to recurrent resistant CMV viremia.   History of UC with screening colonoscopy showing an evidence of CMV colitis. CMV PCR checked and was at 4.8 log 6/2022.         Active Problems and Infectious Diseases Issues:   1. Recurrent CMV viremia.   Started as a primary, donor-derived CMV viremia and colitis following an escalation of IS due to mild rejection. MMF was later discontinued.   CMV viremia would recur very shortly after stopping maribavir and while on letermovir.     CMV-specific CD4 and CD8 were checked on 10/7/2022; the results showed significantly low CD4 and reasonable (on the low side) CD8. An attempt to stop maribavir failed.     I do not believe we are going to be able to get the patient off of maribavir while on the current dose of TAC.     Of note, colonoscopy was repeated 10/31/2022 with resolution of the ileal ulcer and no CMV on random biopsies.         Old Problems and Infectious Diseases Issues:   1. SBP and BSI with S mitis 4/29/2021; on IV ABx since 4/29/2021 for possible  association with liver abscess.    2. Indeterminate QuantiFeron due to low reaction to mitogen likely due to the patient's underlying liver disease. This patient is low risk for TB, CXR unremarkable; no further evaluations were deemed necessary.     Other Infectious Disease issues include:  - QTc: 465 as of 11/2021.   - PCP prophylaxis: bactrim until 4/24/2022 (5-6 months).   - Serostatus: CMV D+/R-, EBV D+/R+, HSV1?/2?, VZV ?. Currently on maribavir.    - Immunization status: This patient received the fifth dose (second booster, bivalent) of the COVID-19 vaccine on 10/12/2022. Otherwise she needs to repeat the shingrix vaccine series since she only received one dose on 6/21/2021. Will need 23-valent pneumonia vaccine. I would not like to make to many changes that would affect the CMV. So I will hold off on these vaccinations for now.   - Gamma globulin status: >1000 as of 10/12/2022.       Attestation:  Total duration of visit including chart review, reviewing labs and imaging, interviewing and examining the patient, documentation, and sending communication to the patient and to the primary treating team, all at the same day of this encounter, is: 40 minutes.   Forest Dasilva MD    Contact information available via [facility] Paging/Directory     12/13/2022           Interim History:   Joints pain have improved and CTS pain and numbness improved with slings bilaterally at night.     No fever, no diarrhea, no N/V. She continues to have dysgeusia with maribavir.         History of the Infectious Disease lllness:       Transplants:  11/18/2021 (Liver); Postoperative day:  390    57 yo female with PBC-liver cirrhosis s/p OLT in 11/2021. The patient had mild ACR on a liver biopsy 5/2022, MMF was readminister and TAC/prednisone were maintained.   Also history of UC for which she undergoes screening colonoscopy.   During the last screening colonoscopy on 6/8/2022, she was found to have a single, 2mm cecal ulcer. The biopsy  was c/w CMV infection.   MMF was discontinued.   The patient had no fever, diarrhea, N/V or abdominal pain. She was started on PO VGCV, later CMV VL was 4.8 log. Due to lack of symptoms, PO VGCV was continued. Unfortunately, after initial response, VL persisted at low level and did not respond to high dose VGCV. 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 CMV VL recurred while on suppressive therapy with letermovir. Maribavir was resumed with adequate response. CMV-specific immunity was not adequate enough to stop maribavir, yet, an attempt to discontinue maribavir was still done and failed with rising CMV VL within one week of stopping maribavir. Maribavir was again resumed 12/2022.   The patient was asymptomatic all along.     Exposure History:  Lives with her  in a single family house in the [place] area.   Does some gardening but wear gloves.   Traveled to [facility] in the past.   The only international travel was to [place], to a resort only.   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   Has two grown up children, do not live with her.   Has a dog.          Review of Systems:   As mentioned in the interim history otherwise negative by reviewing constitutional symptoms, central and peripheral neurological systems, respiratory system, cardiac system, GI system,  system, musculoskeletal, skin, allergy, and lymphatics.           Immunizations:     Immunization History   Administered Date(s) Administered     COVID-19 Vaccine 18+ (Moderna) 02/25/2021, 03/18/2021, 04/15/2021, 03/21/2022     COVID-19 Vaccine Bivalent Booster 12+ (Pfizer) 10/12/2022     Flu, Unspecified 10/18/2016, 11/01/2017     HepA-Adult 05/12/2021     HepB-Adult 05/11/2021     Influenza (IIV3) PF 11/10/2010     Influenza Vaccine 50-64 or 18-64 w/egg allergy (Flublok) 10/12/2022     Influenza Vaccine >6 months (Alfuria,Fluzone) 09/03/2015, 12/30/2019, 09/15/2020     Influenza Vaccine, 6+MO IM  (QUADRIVALENT W/PRESERVATIVES) 10/17/2018     Pneumo Conj 13-V (2010&after) 06/12/2021     Td (Adult), Adsorbed 03/06/1995, 05/27/2005     Tdap (Adacel,Boostrix) 07/01/2014     Twinrix A/B 06/12/2021     Zoster vaccine recombinant adjuvanted (SHINGRIX) 06/21/2021             Allergies:     Allergies   Allergen Reactions     Diagnostic X-Ray Materials Hives     PATIENT HAD HIVE REACTION AFTER ADMINISTERING CT CONTRAST DYE.       Contrast Dye              Medications:     Current Outpatient Medications   Medication Sig     amLODIPine (NORVASC) 5 MG tablet TAKE 2 TABLETS(10 MG) BY MOUTH DAILY     maribavir (LIVTENCITY) 200 MG tablet Take 2 tablets (400 mg) by mouth 2 times daily     multivitamin (CENTRUM SILVER) tablet Take 1 tablet by mouth daily     tacrolimus (GENERIC EQUIVALENT) 1 MG capsule Take 2 capsules (2 mg) by mouth every 12 hours     No current facility-administered medications for this visit.            Physical Exam:   No vitals are available during this virtual visit.   Constitutional: awake, alert, cooperative, no apparent distress and appears at stated age, well nourished.            Laboratory Data:     No results found for: ACD4    Inflammatory Markers    Recent Labs   Lab Test 11/17/21  1908 08/19/21  0018 06/08/21  1602 06/01/21  0915 05/25/21  1455 05/21/21  1500 05/18/21  1010   CRP 54.0* 40.0* 23.1* 16.1* 14.3* 16.1* 21.4*       Immune Globulin Studies      Recent Labs   Lab Test 10/12/22  0758   IGG 1,145       Metabolic Studies    Recent Labs   Lab Test 12/05/22  0910 11/09/22  0740 10/05/22  0749 09/07/22  0745 08/10/22  0740 08/01/22  1653 07/27/22  0756 12/06/21  1031 12/03/21  1145 11/23/21  0551 11/23/21  0538 11/19/21  0127 11/19/21  0126    142 142 140 142 142 139   < > 142   < >  --    < >  --    POTASSIUM 4.6 4.2 4.3 3.8 3.5 3.8 4.1   < > 4.4   < >  --    < >  --    CHLORIDE 107 112* 113* 112* 111* 111* 111*   < > 113*   < >  --    < >  --    CO2 23 24 21 22 23 22 21   < > 23    < >  --    < >  --    ANIONGAP 11 6 8 6 8 9 7   < > 6   < >  --    < >  --    BUN 18.2 18 22 21 24 31* 23   < > 24   < >  --    < >  --    CR 0.85 0.76 0.81 0.83 0.85 0.81 0.68   < > 0.65   < >  --    < >  --    GFRESTIMATED 78 90 83 81 79 84 >90   < > >90   < >  --    < >  --    GLC 91 92 100* 93 91 93 84   < > 97   < >  --    < >  --    A1C  --   --   --   --   --   --   --   --   --   --   --   --  5.4   MARIELENA 9.9 9.6 9.4 9.0 8.8 9.1 8.7   < > 8.4*   < >  --    < >  --    PHOS 4.0 4.3 4.4 4.0 3.3  --  3.3   < > 3.5   < > 1.9*   < >  --    MAG  --   --   --   --  1.8  --  2.0   < > 1.7   < > 2.2   < >  --    LACT  --   --   --   --   --   --   --   --  0.6*  --  0.8   < >  --     < > = values in this interval not displayed.       Hepatic Studies    Recent Labs   Lab Test 12/05/22 0910 11/09/22 0740 10/05/22  0749 09/07/22  0745 08/10/22  0740 08/01/22  1653   BILITOTAL 0.5 0.3 0.6 0.6 0.6 0.4   ALKPHOS 58 54 66 50 49 52   PROTTOTAL 7.4 7.5 7.7 7.1 6.9 7.4   ALBUMIN 4.3 3.8 3.7 3.6 3.6 3.9   AST 18 15 18 25 21 18   ALT 11 18 17 32 30 28       Hematology Studies     Recent Labs   Lab Test 12/05/22 0910 11/09/22 0740 10/12/22  0758 09/07/22  0745 08/10/22  0740 08/01/22  1653 07/27/22  0756 07/20/22  0805 07/13/22  0758 07/06/22  0801 06/21/21  0938 06/12/21  1259   WBC 6.1 6.0 4.4 4.5 2.2* 2.3* 2.0*   < > 2.3* 2.2*   < > 21.3*   ANEU  --   --   --   --  0.9* 1.4* 1.1*  --  1.4* 1.2*  --  18.5*   ALYM  --   --   --   --  1.2 0.9 0.9  --  0.9 0.9  --  1.1   BRIGIDO  --   --   --   --  0.1 0.0 0.0  --  0.0 0.1  --  1.5*   AEOS  --   --   --   --  0.0 0.0 0.0  --  0.0 0.0  --  0.0   HGB 11.7 11.5* 10.4* 11.7 12.7 12.0 12.4   < > 12.9 12.1   < > 10.3*   HCT 34.0* 33.4* 29.9* 33.6* 37.9 36.2 36.8   < > 38.9 37.9   < > 32.3*    231 181 156 171 156 175   < > 218 204   < > 291    < > = values in this interval not displayed.       Clotting Studies    Recent Labs   Lab Test 05/17/22  1011 12/13/21  1022 12/03/21  1145  11/22/21  0419 11/21/21  0318 11/20/21  0508   INR 1.09 1.12 1.13 1.22* 1.34*  --    PTT  --   --  36 29 31 33       Urine Studies    Recent Labs   Lab Test 07/27/22  0803 12/03/21  1342 11/24/21 2014 11/20/21  1030 11/06/21  0509 10/25/21  1339 09/01/21  1757   URINEPH 7.0 6.0 5.0 5.0 5.5 5.5 5.5   NITRITE Negative Negative Negative Negative Negative Negative Negative   LEUKEST Negative Negative Negative Negative Negative Negative Negative   WBCU  --  1  --  3 1 1 2         Microbiology:  Last 6 Culture results with specimen source  Culture Micro   Date Value Ref Range Status   06/10/2021 No growth  Final   06/10/2021 Canceled, Test credited  Final   06/10/2021 Duplicate request  Final   06/10/2021   Final    Notification of test cancellation was given to  Christine Torres RN from . 6/10/21 at 0812.TV.     06/10/2021 No growth  Final   06/09/2021 No growth  Final    Specimen Description   Date Value Ref Range Status   06/10/2021 Ascites Fluid  Final   06/10/2021 Ascites Fluid  Final   06/10/2021 Midstream Urine  Final   06/10/2021 Midstream Urine  Final   06/09/2021 Blood Left Arm  Final   06/09/2021 Blood PICC  Final      No results found for: CMV    Last check of C difficile  C Diff Toxin B PCR   Date Value Ref Range Status   05/16/2018 Negative NEG^Negative Final     Comment:     Negative: Clostridium difficile target DNA sequences NOT detected, presumed   negative for Clostridium difficile toxin B or the number of bacteria present   may be below the limit of detection for the test.  FDA approved assay performed using [employer] GeneXpert real-time PCR.  A negative result does not exclude actual disease due to Clostridium difficile   and may be due to improper collection, handling and storage of the specimen   or the number of organisms in the specimen is below the detection limit of the   assay.       C Difficile Toxin B by PCR   Date Value Ref Range Status   12/03/2021 Negative Negative Final     Comment:     A  negative result does not exclude actual disease due to C. difficile and may be due to improper collection, handling and storage of the specimen or the number of organisms in the specimen is below the detection limit of the assay.         Virology:  CMV viral loads    CMV viral loads    CMV DNA IU/mL   Date Value Ref Range Status   12/05/2022 <137 (A) Not Detected IU/mL Final     Comment:     CMV DNA detected, less than 137 IU/mL     CMV DNA IU/mL, Instrument   Date Value Ref Range Status   09/14/2022 1,177 (H) <1 IU/mL Final   08/24/2022 181 (H) <1 IU/mL Final   08/17/2022 244 (H) <1 IU/mL Final   08/10/2022 467 (H) <1 IU/mL Final   08/03/2022 211 (H) <1 IU/mL Final   07/27/2022 206 (H) <1 IU/mL Final   07/20/2022 259 (H) <1 IU/mL Final   07/13/2022 932 (H) <1 IU/mL Final   07/06/2022 3,537 (H) <1 IU/mL Final     CMV viral loads    Recent Labs   Lab Test 12/05/22  0910 09/21/22  0758 09/14/22  0747 08/31/22  0748 08/24/22  0740 08/17/22  0750 08/10/22  0740 08/03/22  0753 07/27/22  0756 07/20/22  0805 07/13/22  0758 07/06/22  0801   CMVQNT <137*   < >  --    < >  --   --   --   --   --   --   --   --    CMVRESINST  --   --  1,177*  --  181* 244* 467* 211* 206* 259* 932* 3,537*   CMVLOG <2.1   < > 3.1   < > 2.3 2.4 2.7 2.3 2.3 2.4 3.0 3.5    < > = values in this interval not displayed.       CMV viral loads    CMV DNA IU/mL   Date Value Ref Range Status   12/05/2022 <137 (A) Not Detected IU/mL Final     Comment:     CMV DNA detected, less than 137 IU/mL   11/23/2022 <137 (A) Not Detected IU/mL Final     Comment:     CMV DNA detected, less than 137 IU/mL   11/09/2022 Not Detected Not Detected IU/mL Final   10/26/2022 Not Detected Not Detected IU/mL Final   10/12/2022 <137 (A) Not Detected IU/mL Final     Comment:     CMV DNA detected, less than 137 IU/mL   10/05/2022 <137 (A) Not Detected IU/mL Final     Comment:     CMV DNA detected, less than 137 IU/mL   09/28/2022 <137 (A) Not Detected IU/mL Final     Comment:      CMV DNA detected, less than 137 IU/mL   09/21/2022 <137 (A) Not Detected IU/mL Final     Comment:     CMV DNA detected, less than 137 IU/mL   09/07/2022 <137 (A) Not Detected IU/mL Final     Comment:     CMV DNA detected, less than 137 IU/mL     CMV DNA IU/mL, Instrument   Date Value Ref Range Status   09/14/2022 1,177 (H) <1 IU/mL Final   08/24/2022 181 (H) <1 IU/mL Final   08/17/2022 244 (H) <1 IU/mL Final   08/10/2022 467 (H) <1 IU/mL Final   08/03/2022 211 (H) <1 IU/mL Final   07/27/2022 206 (H) <1 IU/mL Final   07/20/2022 259 (H) <1 IU/mL Final   07/13/2022 932 (H) <1 IU/mL Final   07/06/2022 3,537 (H) <1 IU/mL Final     CMV log   Date Value Ref Range Status   12/05/2022 <2.1  Final   11/23/2022 <2.1  Final   10/12/2022 <2.1  Final   10/05/2022 <2.1  Final   09/28/2022 <2.1  Final   09/21/2022 <2.1  Final   09/14/2022 3.1  Final   09/07/2022 <2.1  Final   08/31/2022 <2.1  Final   08/24/2022 2.3  Final   08/17/2022 2.4  Final   08/10/2022 2.7  Final   08/03/2022 2.3  Final   07/27/2022 2.3  Final   07/20/2022 2.4  Final   07/13/2022 3.0  Final   07/06/2022 3.5  Final   06/29/2022 3.9  Final   06/22/2022 4.4  Final   06/14/2022 4.8  Final       CMV resistance testing  Recent Labs   Lab Test 07/27/22  0759   CMVCID Sensitive   CMVFOS Sensitive   CMVGAN Resistant     CMV Cidofovir Resist   Date Value Ref Range Status   07/27/2022 Sensitive  Final     CMV Foscarnet Resist   Date Value Ref Range Status   07/27/2022 Sensitive  Final     CMV Ganciclovir Resis   Date Value Ref Range Status   07/27/2022 Resistant  Final     Comment:       The following UL54 mutations were detected: None  The following UL97 mutations were detected: [id number]  INTERPRETIVE INFORMATION: CMV Antiviral Resistance    Codons 457-630 of the UL97 gene and codons 393-1000 of the   UL54 gene are sequenced. Mutations associated with   resistance to ganciclovir, cidofovir, and foscarnet are   reported. Mutations in viral sub-populations below 20    percent of total may not be detected.    This test was developed and its performance characteristics   determined by [facility]. It has not been cleared or   approved by the US Food and Drug Administration. This test   was performed in a CLIA certified laboratory and is   intended for clinical purposes.  Performed By: [facility]  [address] [place] [id number]  : Ariel Frank MD, PhD        EBV viral loads   No lab results found.  No results found for: EBVDN, EBRES, EBVDN, EBVSP, EBVPC, EBVPCR    Human Herpes Virus 6 viral loads    No results found for: H6RES No results found for: H6SPEC    CMV Antibody IgG   Date Value Ref Range Status   11/17/2021 No detectable antibody. No detectable antibody.  Final   08/18/2021 No detectable antibody. No detectable antibody.  Final     CMV Antibody IgM   Date Value Ref Range Status   11/17/2021 Negative Negative Final   08/18/2021 Negative Negative Final     No results found for: EBIG2, EBIGM, EBVIGG, EBIGG, EBVAGN, HR0659, TOXG      Pathology:  Colon Bx 10/31/2022  Addendum   CMV immunohistochemical stains performed on parts C. - F.  are negative.  Analyte Specific Reagents (ASRs) are used in many laboratory tests necessary for standard medical care and generally do not require FDA approval. This test was developed and its performance characteristics determined by [facility]. It has not been cleared or approved by the U.S. Food and Drug Administration.  [facility] are certified for the performance of high-complexity clinical testing under the Clinical Laboratory Improvement Amendments of 1988 (CLIA), and in keeping with the certification requirements, the laboratory has verified this test's accuracy, precision and/or validity of the method.      Addendum electronically signed by Jagdish Mccormick DO on 11/2/2022 at  2:45 PM   Final Diagnosis   A.  COLON, ASCENDING,  POLYP:  - Colonic mucosa with benign lymphoid aggregates  - No dysplasia or malignancy identified    B.  CECUM, POLYP:  - Colonic mucosa with benign lymphoid aggregates  - No dysplasia or malignancy identified    C.  COLON, CECUM/ASCENDING ASCENDING, BIOPSY:  - Unremarkable colonic mucosa  - No dysplasia or malignancy identified    D.  COLON, TRANSVERSE, BIOPSY:  - Unremarkable colonic mucosa  - No dysplasia or malignancy identified    E.  COLON, DESCENDING/SIGMOID, BIOPSY:  - Unremarkable colonic mucosa  - No dysplasia or malignancy identified    F.  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  CECAL ULCER, BIOPSY :  - Colonic mucosa with ulceration and necroinflammatory debris  - POSITIVE for CMV by immunohistochemistry  - Negative for HSV by immunohistochemistry    B.  CECUM AND ASCENDING COLON, BIOPSIES :  - Colonic mucosa with mild crypt distortion and mild chronic inflammation (Re grade 1)  - Negative for dysplasia      C. HEPATIC FLEXURE AND TRANSVERSE COLON, BIOPSIES :  - Colonic mucosa with mild crypt distortion no significant inflammation (Re grade 0)  - Negative for dysplasia      D. DISTAL AND TRANSVERSE COLON, BIOPSIES :  - Colonic mucosa with no significant inflammation (Re grade 0)  - Negative for dysplasia     E. DESCENDING COLON, BIOPSIES :  - Colonic mucosa with mild crypt distortion and mild patchy chronic inflammation (Re grade 1)  - Negative for dysplasia      F. RECTAL BIOPSIES :  - Colonic mucosa with no significant inflammation (Re grade 0)  - Negative for dysplasia      Liver biopsy 5/17/2022  Final Diagnosis   A.  LIVER, ALLOGRAFT, NEEDLE CORE BIOPSY:  - Mild focal portal inflammation indeterminate for acute cellular rejection (CHIU: 2-3/9)

## 2023-01-18 ENCOUNTER — TELEPHONE (OUTPATIENT)
Dept: INFECTIOUS DISEASES | Facility: CLINIC | Age: 58
End: 2023-01-18
Payer: COMMERCIAL

## 2023-01-18 DIAGNOSIS — Z94.4 LIVER TRANSPLANTED (H): ICD-10-CM

## 2023-01-18 DIAGNOSIS — Z94.4 LIVER REPLACED BY TRANSPLANT (H): Primary | ICD-10-CM

## 2023-01-18 LAB
CMV DNA SPEC NAA+PROBE-ACNC: <137 IU/ML
CMV DNA SPEC NAA+PROBE-LOG#: <2.1 {LOG_COPIES}/ML

## 2023-01-18 RX ORDER — TACROLIMUS 1 MG/1
1 CAPSULE ORAL EVERY 12 HOURS
Qty: 180 CAPSULE | Refills: 3 | Status: SHIPPED | OUTPATIENT
Start: 2023-01-18 | End: 2023-05-22

## 2023-01-18 NOTE — TELEPHONE ENCOUNTER
Instructed pt to change labs every 2 months. Pt states that Vidya wanted pt to get tacro level every 2 weeks. Asked pt to call Vidya's office to clarify if every 2 months is fine.

## 2023-01-18 NOTE — TELEPHONE ENCOUNTER
ISSUE:   Tacrolimus IR level 6.1 on 1/17, goal 3-5, dose 2 mg BID.    PLAN:   Please call patient and confirm this was an accurate 12-hour trough. Verify Tacrolimus IR dose. Confirm no new medications or illness. Confirm no missed doses. If accurate trough and accurate dose, decrease Tacrolimus IR dose to 1 mg BID and repeat labs in 1-2 mos. (2 mos would be fine for transplant but I know Dr. Dasilva wants more frequently so she should follow his schedule but doesn't need transplant labs every time.  Vanessa Durant, RN    OUTCOME:   Spoke with patient, they confirm accurate trough level and current dose 2 mg BID. Patient confirmed dose change to 1 mg BID and to repeat labs in 2 months. Orders sent to preferred pharmacy for dose change and lab for repeat labs. Patient voiced understanding of plan. Informed pt that she only needs tx labs every two months.     Anne Marie Miranda LPN

## 2023-01-18 NOTE — LETTER
OUTPATIENT LABORATORY TEST ORDER   Patient copy/Reminder    Patient Name: Berta Nava   YOB: 1965     [facility] MR# [if applicable]: [id number]   Date & Time: January 18, 2023  11:20 AM  Expiration Date: 1 year after date issued       Diagnosis: Liver Transplant (ICD-10 Z94.4)   Aftercare following organ transplant (ICD-10 Z48.288)   Long term use of medications (ICD-10 Z79.899)      We ask your assistance in obtaining the following laboratory tests, which are part of our routine surveillance program for Solid Organ Transplant patients.       Please fax each result to [phone], same day as resulted/available      Critical lab results page [phone]  Monday - Friday 8 am to 5 pm    Evening/Weekend/Holiday communicate Critical labs results [phone]    >1-year post-transplant  Every 2-3 months    CBC with Platelets     Basic Metabolic Panel     Hepatic panel     Tacrolimus drug level - 12 hour trough, please document time of last dose       >1-year post-transplant  Labs annually due February 2022    Fasting Lipid Panel    Urine protein/creatinine ratio    UA with reflex to micro    Hepatitis B DNA PCR      If you have any questions, please call [facility]- [phone] or [phone], Fax- [phone].      Thomas M. Leventhal, M.D.   of Medicine  Advanced & Transplant Hepatology  [facility]

## 2023-01-18 NOTE — TELEPHONE ENCOUNTER
Patient scheduled for 1 month follow up appointment with Dr. Dasilva on 2/14/2023 at 5:30 pm video visit.     Jacinta Lawrence LPN 01/18/23 12:22 PM

## 2023-01-31 ENCOUNTER — LAB (OUTPATIENT)
Dept: LAB | Facility: CLINIC | Age: 58
End: 2023-01-31
Payer: COMMERCIAL

## 2023-01-31 DIAGNOSIS — B25.9 CYTOMEGALOVIRUS (CMV) VIREMIA (H): ICD-10-CM

## 2023-01-31 LAB — CMV DNA SPEC NAA+PROBE-ACNC: NOT DETECTED IU/ML

## 2023-01-31 PROCEDURE — 36415 COLL VENOUS BLD VENIPUNCTURE: CPT

## 2023-02-14 ENCOUNTER — LAB (OUTPATIENT)
Dept: LAB | Facility: CLINIC | Age: 58
End: 2023-02-14
Payer: COMMERCIAL

## 2023-02-14 ENCOUNTER — VIRTUAL VISIT (OUTPATIENT)
Dept: INFECTIOUS DISEASES | Facility: CLINIC | Age: 58
End: 2023-02-14
Attending: INTERNAL MEDICINE
Payer: COMMERCIAL

## 2023-02-14 VITALS — HEIGHT: 65 IN | BODY MASS INDEX: 25.83 KG/M2 | WEIGHT: 155 LBS

## 2023-02-14 DIAGNOSIS — Z94.4 LIVER REPLACED BY TRANSPLANT (H): ICD-10-CM

## 2023-02-14 DIAGNOSIS — B34.9 INFECTION DUE TO ANTIMICROBIAL RESISTANT VIRUS: ICD-10-CM

## 2023-02-14 DIAGNOSIS — B25.9 CYTOMEGALOVIRUS (CMV) VIREMIA (H): Primary | ICD-10-CM

## 2023-02-14 DIAGNOSIS — Z94.4 S/P LIVER TRANSPLANT (H): ICD-10-CM

## 2023-02-14 DIAGNOSIS — Z23 NEED FOR VACCINATION: ICD-10-CM

## 2023-02-14 DIAGNOSIS — B25.9 CYTOMEGALOVIRUS (CMV) VIREMIA (H): ICD-10-CM

## 2023-02-14 DIAGNOSIS — A08.39 CMV COLITIS (H): ICD-10-CM

## 2023-02-14 DIAGNOSIS — Z94.4 LIVER TRANSPLANTED (H): ICD-10-CM

## 2023-02-14 DIAGNOSIS — Z16.33 INFECTION DUE TO ANTIMICROBIAL RESISTANT VIRUS: ICD-10-CM

## 2023-02-14 DIAGNOSIS — B25.9 CMV COLITIS (H): ICD-10-CM

## 2023-02-14 LAB
ALBUMIN SERPL BCG-MCNC: 4.4 G/DL (ref 3.5–5.2)
ALP SERPL-CCNC: 58 U/L (ref 35–104)
ALT SERPL W P-5'-P-CCNC: 13 U/L (ref 10–35)
ANION GAP SERPL CALCULATED.3IONS-SCNC: 14 MMOL/L (ref 7–15)
AST SERPL W P-5'-P-CCNC: 26 U/L (ref 10–35)
BASOPHILS # BLD AUTO: 0 10E3/UL (ref 0–0.2)
BASOPHILS NFR BLD AUTO: 0 %
BILIRUB DIRECT SERPL-MCNC: NORMAL MG/DL
BILIRUB SERPL-MCNC: 0.4 MG/DL
BUN SERPL-MCNC: 12.4 MG/DL (ref 8–23)
CALCIUM SERPL-MCNC: 9.9 MG/DL (ref 8.6–10)
CHLORIDE SERPL-SCNC: 104 MMOL/L (ref 98–107)
CREAT SERPL-MCNC: 0.77 MG/DL (ref 0.51–0.95)
DEPRECATED HCO3 PLAS-SCNC: 21 MMOL/L (ref 22–29)
EOSINOPHIL # BLD AUTO: 0.3 10E3/UL (ref 0–0.7)
EOSINOPHIL NFR BLD AUTO: 4 %
ERYTHROCYTE [DISTWIDTH] IN BLOOD BY AUTOMATED COUNT: 14.3 % (ref 10–15)
GFR SERPL CREATININE-BSD FRML MDRD: 88 ML/MIN/1.73M2
GLUCOSE SERPL-MCNC: 91 MG/DL (ref 70–99)
HCT VFR BLD AUTO: 38.1 % (ref 35–47)
HGB BLD-MCNC: 12.9 G/DL (ref 11.7–15.7)
IMM GRANULOCYTES # BLD: 0 10E3/UL
IMM GRANULOCYTES NFR BLD: 0 %
LYMPHOCYTES # BLD AUTO: 4.3 10E3/UL (ref 0.8–5.3)
LYMPHOCYTES NFR BLD AUTO: 60 %
MCH RBC QN AUTO: 28.7 PG (ref 26.5–33)
MCHC RBC AUTO-ENTMCNC: 33.9 G/DL (ref 31.5–36.5)
MCV RBC AUTO: 85 FL (ref 78–100)
MONOCYTES # BLD AUTO: 0.4 10E3/UL (ref 0–1.3)
MONOCYTES NFR BLD AUTO: 5 %
NEUTROPHILS # BLD AUTO: 2.2 10E3/UL (ref 1.6–8.3)
NEUTROPHILS NFR BLD AUTO: 31 %
NRBC # BLD AUTO: 0 10E3/UL
NRBC BLD AUTO-RTO: 0 /100
PHOSPHATE SERPL-MCNC: 3.9 MG/DL (ref 2.5–4.5)
PLATELET # BLD AUTO: 244 10E3/UL (ref 150–450)
POTASSIUM SERPL-SCNC: 4.2 MMOL/L (ref 3.4–5.3)
PROT SERPL-MCNC: 7.6 G/DL (ref 6.4–8.3)
RBC # BLD AUTO: 4.49 10E6/UL (ref 3.8–5.2)
SODIUM SERPL-SCNC: 139 MMOL/L (ref 136–145)
TACROLIMUS BLD-MCNC: 4.5 UG/L (ref 5–15)
TME LAST DOSE: ABNORMAL H
TME LAST DOSE: ABNORMAL H
WBC # BLD AUTO: 7.2 10E3/UL (ref 4–11)

## 2023-02-14 PROCEDURE — G0463 HOSPITAL OUTPT CLINIC VISIT: HCPCS | Mod: PN,GT | Performed by: INTERNAL MEDICINE

## 2023-02-14 PROCEDURE — 80197 ASSAY OF TACROLIMUS: CPT

## 2023-02-14 PROCEDURE — 85025 COMPLETE CBC W/AUTO DIFF WBC: CPT

## 2023-02-14 PROCEDURE — 36415 COLL VENOUS BLD VENIPUNCTURE: CPT

## 2023-02-14 PROCEDURE — 99214 OFFICE O/P EST MOD 30 MIN: CPT | Mod: VID | Performed by: INTERNAL MEDICINE

## 2023-02-14 PROCEDURE — 80053 COMPREHEN METABOLIC PANEL: CPT

## 2023-02-14 PROCEDURE — 84100 ASSAY OF PHOSPHORUS: CPT

## 2023-02-14 ASSESSMENT — PAIN SCALES - GENERAL: PAINLEVEL: NO PAIN (0)

## 2023-02-14 NOTE — LETTER
2/14/2023       RE: Berta Nava  [address] [facility] [address]     Dear Colleague,    Thank you for referring your patient, Berta Nava, to the [facility] INFECTIOUS DISEASE CLINIC [place] at [facility]. Please see a copy of my visit note below.    Video-Visit Details    Type of service:  Video Visit    Video Start Time (time video started): 5:18 pm     Video End Time (time video stopped): 5:30 pm    Originating Location (pt. Location): Home    Distant Location (provider location):  On-site    Mode of Communication:  Video Conference via [facility]    Transplant Infectious Diseases Outpatient Progress Note     Patient:  Berta Nava, Date of birth 1965, Medical record number [id number]  Date of Visit:  12/13/2022           Recommendations:   1. Will wait for the TAC level  - if TAC is around 4, will attempt to stop maribavir.   - if TAC is still around 5, will check CMV-specific T-cell immunity.   2. Continue to check CMV PCR weekly.   3. Continue maribavir for now.   4. Proceed to take the second dose of shingrix vaccine and 23-valent pneumovax.     RTC: 1 month, virtual or in-person.         Summary of Presentation:   Transplants:  11/18/2021 (Liver), Postoperative day:  390     This patient is a 56 year old female with PBC-liver cirrhosis s/p OLT in 11/2021. Was on TAC/prednisone.   ?rejection 5/2022, MMF was added to TAC/prednisone.    IS deescalated to TAC/prednisone in 6/2022 due to CMV infection then TAC due to recurrent resistant CMV viremia.   History of UC with screening colonoscopy showing an evidence of CMV colitis. CMV PCR checked and was at 4.8 log 6/2022.         Active Problems and Infectious Diseases Issues:   1. Recurrent CMV viremia.   Started as a primary, donor-derived CMV viremia and colitis following an escalation of IS due to mild rejection. MMF was later  discontinued.  CMV-specific CD4 and CD8 were checked on 10/7/2022; the results showed significantly low CD4 and reasonable (on the low side) CD8. An attempt to stop maribavir failed. CMV viremia recurred  very shortly after stopping maribavir and while on letermovir for secondary ppx.     TAC dose was decreased mid 1/2023.   Will check TAC levels. Further recommendations regarding maribavir depends on TAC levels.     Of note, colonoscopy was repeated 10/31/2022 with resolution of the ileal ulcer and no CMV on random biopsies.         Old Problems and Infectious Diseases Issues:   1. SBP and BSI with S mitis 4/29/2021; on IV ABx since 4/29/2021 for possible association with liver abscess.    2. Indeterminate QuantiFeron due to low reaction to mitogen likely due to the patient's underlying liver disease. This patient is low risk for TB, CXR unremarkable; no further evaluations were deemed necessary.     Other Infectious Disease issues include:  - QTc: 465 as of 11/2021.   - PCP prophylaxis: bactrim until 4/24/2022 (5-6 months).   - Serostatus: CMV D+/R-, EBV D+/R+, HSV1?/2?, VZV ?. Currently on maribavir.    - Immunization status: This patient received the fifth dose (second booster, bivalent) of the COVID-19 vaccine on 10/12/2022. Otherwise she needs the second dose of the shingrix vaccine series and the 23-valent pneumonia vaccine. It is better to proceed with these vaccines while on maribavir.   - Gamma globulin status: >1000 as of 10/12/2022.       Attestation:  Total duration of visit including chart review, reviewing labs and imaging, interviewing and examining the patient, documentation, and sending communication to the patient and to the primary treating team, all at the same day of this encounter, is: 30 minutes.   Forest Dasilva MD    Contact information available via [facility] Paging/Directory     12/13/2022           Interim History:   No fever, no diarrhea, no N/V. She continues to have dysgeusia with  maribavir but tolerable.  No other events.          History of the Infectious Disease lllness:       Transplants:  11/18/2021 (Liver); Postoperative day:  390    55 yo female with PBC-liver cirrhosis s/p OLT in 11/2021. The patient had mild ACR on a liver biopsy 5/2022, MMF was readminister and TAC/prednisone were maintained.   Also history of UC for which she undergoes screening colonoscopy.   During the last screening colonoscopy on 6/8/2022, she was found to have a single, 2mm cecal ulcer. The biopsy was c/w CMV infection.   MMF was discontinued.   The patient had no fever, diarrhea, N/V or abdominal pain. She was started on PO VGCV, later CMV VL was 4.8 log. Due to lack of symptoms, PO VGCV was continued. Unfortunately, after initial response, VL persisted at low level and did not respond to high dose VGCV. 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 CMV VL recurred while on suppressive therapy with letermovir. Maribavir was resumed with adequate response. CMV-specific immunity was not adequate enough to stop maribavir, yet, an attempt to discontinue maribavir was still done and failed with rising CMV VL within one week of stopping maribavir. Maribavir was again resumed 12/2022.   The patient was asymptomatic all along.     Exposure History:  Lives with her  in a single family house in the [place] area.   Does some gardening but wear gloves.   Traveled to [facility] in the past.   The only international travel was to [place], to a resort only.   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   Has two grown up children, do not live with her.   Has a dog.          Review of Systems:   As mentioned in the interim history otherwise negative by reviewing constitutional symptoms, central and peripheral neurological systems, respiratory system, cardiac system, GI system,  system, musculoskeletal, skin, allergy, and lymphatics.           Immunizations:     Immunization  History   Administered Date(s) Administered     COVID-19 Vaccine 18+ (Moderna) 02/25/2021, 03/18/2021, 04/15/2021, 03/21/2022     COVID-19 Vaccine Bivalent Booster 12+ (Pfizer) 10/12/2022     Flu, Unspecified 10/18/2016, 11/01/2017     HepA-Adult 05/12/2021     HepB-Adult 05/11/2021     Influenza (IIV3) PF 11/10/2010     Influenza Vaccine 50-64 or 18-64 w/egg allergy (Flublok) 10/12/2022     Influenza Vaccine >6 months (Alfuria,Fluzone) 09/03/2015, 12/30/2019, 09/15/2020     Influenza Vaccine, 6+MO IM (QUADRIVALENT W/PRESERVATIVES) 10/17/2018     Pneumo Conj 13-V (2010&after) 06/12/2021     Td (Adult), Adsorbed 03/06/1995, 05/27/2005     Tdap (Adacel,Boostrix) 07/01/2014     Twinrix A/B 06/12/2021     Zoster vaccine recombinant adjuvanted (SHINGRIX) 06/21/2021             Allergies:     Allergies   Allergen Reactions     Diagnostic X-Ray Materials Hives     PATIENT HAD HIVE REACTION AFTER ADMINISTERING CT CONTRAST DYE.       Contrast Dye              Medications:     Current Outpatient Medications   Medication Sig     amLODIPine (NORVASC) 5 MG tablet TAKE 2 TABLETS(10 MG) BY MOUTH DAILY     maribavir (LIVTENCITY) 200 MG tablet Take 2 tablets (400 mg) by mouth 2 times daily     multivitamin (CENTRUM SILVER) tablet Take 1 tablet by mouth daily     tacrolimus (GENERIC EQUIVALENT) 1 MG capsule Take 2 capsules (2 mg) by mouth every 12 hours     No current facility-administered medications for this visit.            Physical Exam:   No vitals are available during this virtual visit.   Constitutional: awake, alert, cooperative, no apparent distress and appears at stated age, well nourished.            Laboratory Data:     No results found for: ACD4    Inflammatory Markers    Recent Labs   Lab Test 11/17/21  1908 08/19/21  0018 06/08/21  1602 06/01/21  0915 05/25/21  1455 05/21/21  1500 05/18/21  1010   CRP 54.0* 40.0* 23.1* 16.1* 14.3* 16.1* 21.4*       Immune Globulin Studies      Recent Labs   Lab Test 10/12/22  0758    IGG 1,145       Metabolic Studies    Recent Labs   Lab Test 12/05/22  0910 11/09/22  0740 10/05/22  0749 09/07/22  0745 08/10/22  0740 08/01/22  1653 07/27/22  0756 12/06/21  1031 12/03/21  1145 11/23/21  0551 11/23/21  0538 11/19/21  0127 11/19/21  0126    142 142 140 142 142 139   < > 142   < >  --    < >  --    POTASSIUM 4.6 4.2 4.3 3.8 3.5 3.8 4.1   < > 4.4   < >  --    < >  --    CHLORIDE 107 112* 113* 112* 111* 111* 111*   < > 113*   < >  --    < >  --    CO2 23 24 21 22 23 22 21   < > 23   < >  --    < >  --    ANIONGAP 11 6 8 6 8 9 7   < > 6   < >  --    < >  --    BUN 18.2 18 22 21 24 31* 23   < > 24   < >  --    < >  --    CR 0.85 0.76 0.81 0.83 0.85 0.81 0.68   < > 0.65   < >  --    < >  --    GFRESTIMATED 78 90 83 81 79 84 >90   < > >90   < >  --    < >  --    GLC 91 92 100* 93 91 93 84   < > 97   < >  --    < >  --    A1C  --   --   --   --   --   --   --   --   --   --   --   --  5.4   MARIELENA 9.9 9.6 9.4 9.0 8.8 9.1 8.7   < > 8.4*   < >  --    < >  --    PHOS 4.0 4.3 4.4 4.0 3.3  --  3.3   < > 3.5   < > 1.9*   < >  --    MAG  --   --   --   --  1.8  --  2.0   < > 1.7   < > 2.2   < >  --    LACT  --   --   --   --   --   --   --   --  0.6*  --  0.8   < >  --     < > = values in this interval not displayed.       Hepatic Studies    Recent Labs   Lab Test 12/05/22  0910 11/09/22  0740 10/05/22  0749 09/07/22  0745 08/10/22  0740 08/01/22  1653   BILITOTAL 0.5 0.3 0.6 0.6 0.6 0.4   ALKPHOS 58 54 66 50 49 52   PROTTOTAL 7.4 7.5 7.7 7.1 6.9 7.4   ALBUMIN 4.3 3.8 3.7 3.6 3.6 3.9   AST 18 15 18 25 21 18   ALT 11 18 17 32 30 28       Hematology Studies     Recent Labs   Lab Test 12/05/22  0910 11/09/22  0740 10/12/22  0758 09/07/22  0745 08/10/22  0740 08/01/22  1653 07/27/22  0756 07/20/22  0805 07/13/22  0758 07/06/22  0801 06/21/21  0938 06/12/21  1259   WBC 6.1 6.0 4.4 4.5 2.2* 2.3* 2.0*   < > 2.3* 2.2*   < > 21.3*   ANEU  --   --   --   --  0.9* 1.4* 1.1*  --  1.4* 1.2*  --  18.5*   ALYM  --   --   --    --  1.2 0.9 0.9  --  0.9 0.9  --  1.1   BRIGIDO  --   --   --   --  0.1 0.0 0.0  --  0.0 0.1  --  1.5*   AEOS  --   --   --   --  0.0 0.0 0.0  --  0.0 0.0  --  0.0   HGB 11.7 11.5* 10.4* 11.7 12.7 12.0 12.4   < > 12.9 12.1   < > 10.3*   HCT 34.0* 33.4* 29.9* 33.6* 37.9 36.2 36.8   < > 38.9 37.9   < > 32.3*    231 181 156 171 156 175   < > 218 204   < > 291    < > = values in this interval not displayed.       Clotting Studies    Recent Labs   Lab Test 05/17/22  1011 12/13/21  1022 12/03/21  1145 11/22/21  0419 11/21/21  0318 11/20/21  0508   INR 1.09 1.12 1.13 1.22* 1.34*  --    PTT  --   --  36 29 31 33       Urine Studies    Recent Labs   Lab Test 07/27/22  0803 12/03/21  1342 11/24/21 2014 11/20/21  1030 11/06/21  0509 10/25/21  1339 09/01/21  1757   URINEPH 7.0 6.0 5.0 5.0 5.5 5.5 5.5   NITRITE Negative Negative Negative Negative Negative Negative Negative   LEUKEST Negative Negative Negative Negative Negative Negative Negative   WBCU  --  1  --  3 1 1 2         Microbiology:  Last 6 Culture results with specimen source  Culture Micro   Date Value Ref Range Status   06/10/2021 No growth  Final   06/10/2021 Canceled, Test credited  Final   06/10/2021 Duplicate request  Final   06/10/2021   Final    Notification of test cancellation was given to  Christine Torres RN from 7C. 6/10/21 at 0812.TV.     06/10/2021 No growth  Final   06/09/2021 No growth  Final    Specimen Description   Date Value Ref Range Status   06/10/2021 Ascites Fluid  Final   06/10/2021 Ascites Fluid  Final   06/10/2021 Midstream Urine  Final   06/10/2021 Midstream Urine  Final   06/09/2021 Blood Left Arm  Final   06/09/2021 Blood PICC  Final      No results found for: CMV    Last check of C difficile  C Diff Toxin B PCR   Date Value Ref Range Status   05/16/2018 Negative NEG^Negative Final     Comment:     Negative: Clostridium difficile target DNA sequences NOT detected, presumed   negative for Clostridium difficile toxin B or the number  of bacteria present   may be below the limit of detection for the test.  FDA approved assay performed using [employer] Gene6Sense real-time PCR.  A negative result does not exclude actual disease due to Clostridium difficile   and may be due to improper collection, handling and storage of the specimen   or the number of organisms in the specimen is below the detection limit of the   assay.       C Difficile Toxin B by PCR   Date Value Ref Range Status   12/03/2021 Negative Negative Final     Comment:     A negative result does not exclude actual disease due to C. difficile and may be due to improper collection, handling and storage of the specimen or the number of organisms in the specimen is below the detection limit of the assay.         Virology:  CMV viral loads    CMV viral loads    CMV DNA IU/mL   Date Value Ref Range Status   12/05/2022 <137 (A) Not Detected IU/mL Final     Comment:     CMV DNA detected, less than 137 IU/mL     CMV DNA IU/mL, Instrument   Date Value Ref Range Status   09/14/2022 1,177 (H) <1 IU/mL Final   08/24/2022 181 (H) <1 IU/mL Final   08/17/2022 244 (H) <1 IU/mL Final   08/10/2022 467 (H) <1 IU/mL Final   08/03/2022 211 (H) <1 IU/mL Final   07/27/2022 206 (H) <1 IU/mL Final   07/20/2022 259 (H) <1 IU/mL Final   07/13/2022 932 (H) <1 IU/mL Final   07/06/2022 3,537 (H) <1 IU/mL Final     CMV viral loads    Recent Labs   Lab Test 12/05/22  0910 09/21/22  0758 09/14/22  0747 08/31/22  0748 08/24/22  0740 08/17/22  0750 08/10/22  0740 08/03/22  0753 07/27/22  0756 07/20/22  0805 07/13/22  0758 07/06/22  0801   CMVQNT <137*   < >  --    < >  --   --   --   --   --   --   --   --    CMVRESINST  --   --  1,177*  --  181* 244* 467* 211* 206* 259* 932* 3,537*   CMVLOG <2.1   < > 3.1   < > 2.3 2.4 2.7 2.3 2.3 2.4 3.0 3.5    < > = values in this interval not displayed.       CMV viral loads    CMV DNA IU/mL   Date Value Ref Range Status   12/05/2022 <137 (A) Not Detected IU/mL Final     Comment:      CMV DNA detected, less than 137 IU/mL   11/23/2022 <137 (A) Not Detected IU/mL Final     Comment:     CMV DNA detected, less than 137 IU/mL   11/09/2022 Not Detected Not Detected IU/mL Final   10/26/2022 Not Detected Not Detected IU/mL Final   10/12/2022 <137 (A) Not Detected IU/mL Final     Comment:     CMV DNA detected, less than 137 IU/mL   10/05/2022 <137 (A) Not Detected IU/mL Final     Comment:     CMV DNA detected, less than 137 IU/mL   09/28/2022 <137 (A) Not Detected IU/mL Final     Comment:     CMV DNA detected, less than 137 IU/mL   09/21/2022 <137 (A) Not Detected IU/mL Final     Comment:     CMV DNA detected, less than 137 IU/mL   09/07/2022 <137 (A) Not Detected IU/mL Final     Comment:     CMV DNA detected, less than 137 IU/mL     CMV DNA IU/mL, Instrument   Date Value Ref Range Status   09/14/2022 1,177 (H) <1 IU/mL Final   08/24/2022 181 (H) <1 IU/mL Final   08/17/2022 244 (H) <1 IU/mL Final   08/10/2022 467 (H) <1 IU/mL Final   08/03/2022 211 (H) <1 IU/mL Final   07/27/2022 206 (H) <1 IU/mL Final   07/20/2022 259 (H) <1 IU/mL Final   07/13/2022 932 (H) <1 IU/mL Final   07/06/2022 3,537 (H) <1 IU/mL Final     CMV log   Date Value Ref Range Status   12/05/2022 <2.1  Final   11/23/2022 <2.1  Final   10/12/2022 <2.1  Final   10/05/2022 <2.1  Final   09/28/2022 <2.1  Final   09/21/2022 <2.1  Final   09/14/2022 3.1  Final   09/07/2022 <2.1  Final   08/31/2022 <2.1  Final   08/24/2022 2.3  Final   08/17/2022 2.4  Final   08/10/2022 2.7  Final   08/03/2022 2.3  Final   07/27/2022 2.3  Final   07/20/2022 2.4  Final   07/13/2022 3.0  Final   07/06/2022 3.5  Final   06/29/2022 3.9  Final   06/22/2022 4.4  Final   06/14/2022 4.8  Final       CMV resistance testing  Recent Labs   Lab Test 07/27/22  0759   CMVCID Sensitive   CMVFOS Sensitive   CMVGAN Resistant     CMV Cidofovir Resist   Date Value Ref Range Status   07/27/2022 Sensitive  Final     CMV Foscarnet Resist   Date Value Ref Range Status   07/27/2022  Sensitive  Final     CMV Ganciclovir Resis   Date Value Ref Range Status   07/27/2022 Resistant  Final     Comment:       The following UL54 mutations were detected: None  The following UL97 mutations were detected: [id number]  INTERPRETIVE INFORMATION: CMV Antiviral Resistance    Codons 457-630 of the UL97 gene and codons 393-1000 of the   UL54 gene are sequenced. Mutations associated with   resistance to ganciclovir, cidofovir, and foscarnet are   reported. Mutations in viral sub-populations below 20   percent of total may not be detected.    This test was developed and its performance characteristics   determined by [facility]. It has not been cleared or   approved by the US Food and Drug Administration. This test   was performed in a CLIA certified laboratory and is   intended for clinical purposes.  Performed By: [facility]  [address] [place] [id number]  : Ariel Frank MD, PhD        EBV viral loads   No lab results found.  No results found for: EBVDN, EBRES, EBVDN, EBVSP, EBVPC, EBVPCR    Human Herpes Virus 6 viral loads    No results found for: H6RES No results found for: H6SPEC    CMV Antibody IgG   Date Value Ref Range Status   11/17/2021 No detectable antibody. No detectable antibody.  Final   08/18/2021 No detectable antibody. No detectable antibody.  Final     CMV Antibody IgM   Date Value Ref Range Status   11/17/2021 Negative Negative Final   08/18/2021 Negative Negative Final     No results found for: EBIG2, EBIGM, EBVIGG, EBIGG, EBVAGN, RV5442, TOXG      Pathology:  Colon Bx 10/31/2022  Addendum   CMV immunohistochemical stains performed on parts C. - F.  are negative.  Analyte Specific Reagents (ASRs) are used in many laboratory tests necessary for standard medical care and generally do not require FDA approval. This test was developed and its performance characteristics determined by [facility]. It has not been cleared or  approved by the U.S. Food and Drug Administration.  [facility] are certified for the performance of high-complexity clinical testing under the Clinical Laboratory Improvement Amendments of 1988 (CLIA), and in keeping with the certification requirements, the laboratory has verified this test's accuracy, precision and/or validity of the method.      Addendum electronically signed by Jagdish Mccormick DO on 11/2/2022 at  2:45 PM   Final Diagnosis   A.  COLON, ASCENDING, POLYP:  - Colonic mucosa with benign lymphoid aggregates  - No dysplasia or malignancy identified    B.  CECUM, POLYP:  - Colonic mucosa with benign lymphoid aggregates  - No dysplasia or malignancy identified    C.  COLON, CECUM/ASCENDING ASCENDING, BIOPSY:  - Unremarkable colonic mucosa  - No dysplasia or malignancy identified    D.  COLON, TRANSVERSE, BIOPSY:  - Unremarkable colonic mucosa  - No dysplasia or malignancy identified    E.  COLON, DESCENDING/SIGMOID, BIOPSY:  - Unremarkable colonic mucosa  - No dysplasia or malignancy identified    F.  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  CECAL ULCER, BIOPSY :  - Colonic mucosa with ulceration and necroinflammatory debris  - POSITIVE for CMV by immunohistochemistry  - Negative for HSV by immunohistochemistry    B.  CECUM AND ASCENDING COLON, BIOPSIES :  - Colonic mucosa with mild crypt distortion and mild chronic inflammation (Re grade 1)  - Negative for dysplasia      C. HEPATIC FLEXURE AND TRANSVERSE COLON, BIOPSIES :  - Colonic mucosa with mild crypt distortion no significant inflammation (Re grade 0)  - Negative for dysplasia      D. DISTAL AND TRANSVERSE COLON, BIOPSIES :  - Colonic mucosa with no significant inflammation (Re grade 0)  - Negative for dysplasia     E. DESCENDING COLON, BIOPSIES :  - Colonic mucosa with mild crypt  distortion and mild patchy chronic inflammation (Re grade 1)  - Negative for dysplasia      F. RECTAL BIOPSIES :  - Colonic mucosa with no significant inflammation (Re grade 0)  - Negative for dysplasia      Liver biopsy 5/17/2022  Final Diagnosis   A.  LIVER, ALLOGRAFT, NEEDLE CORE BIOPSY:  - Mild focal portal inflammation indeterminate for acute cellular rejection (CHIU: 2-3/9)       Again, thank you for allowing me to participate in the care of your patient.      Sincerely,    Forest Dasilva MD

## 2023-02-14 NOTE — NURSING NOTE
Is the patient currently in the state of [place]? YES    Visit mode:VIDEO    If the visit is dropped, the patient can be reconnected by: VIDEO VISIT: Text to cell phone: [phone]    Will anyone else be joining the visit? NO      How would you like to obtain your AVS? MyChart    Are changes needed to the allergy or medication list? NO    Comments or concerns regarding today's visit:   Jacinda Mercado

## 2023-02-14 NOTE — PROGRESS NOTES
Video-Visit Details    Type of service:  Video Visit    Video Start Time (time video started): 5:18 pm     Video End Time (time video stopped): 5:30 pm    Originating Location (pt. Location): Home    Distant Location (provider location):  On-site    Mode of Communication:  Video Conference via [facility]    Transplant Infectious Diseases Outpatient Progress Note     Patient:  Berta Nava, Date of birth 1965, Medical record number [id number]  Date of Visit:  12/13/2022           Recommendations:   1. Will wait for the TAC level  - if TAC is around 4, will attempt to stop maribavir.   - if TAC is still around 5, will check CMV-specific T-cell immunity.   2. Continue to check CMV PCR weekly.   3. Continue maribavir for now.   4. Proceed to take the second dose of shingrix vaccine and 23-valent pneumovax.     RTC: 1 month, virtual or in-person.         Summary of Presentation:   Transplants:  11/18/2021 (Liver), Postoperative day:  390     This patient is a 56 year old female with PBC-liver cirrhosis s/p OLT in 11/2021. Was on TAC/prednisone.   ?rejection 5/2022, MMF was added to TAC/prednisone.    IS deescalated to TAC/prednisone in 6/2022 due to CMV infection then TAC due to recurrent resistant CMV viremia.   History of UC with screening colonoscopy showing an evidence of CMV colitis. CMV PCR checked and was at 4.8 log 6/2022.         Active Problems and Infectious Diseases Issues:   1. Recurrent CMV viremia.   Started as a primary, donor-derived CMV viremia and colitis following an escalation of IS due to mild rejection. MMF was later discontinued.  CMV-specific CD4 and CD8 were checked on 10/7/2022; the results showed significantly low CD4 and reasonable (on the low side) CD8. An attempt to stop maribavir failed. CMV viremia recurred  very shortly after stopping maribavir and while on letermovir for secondary ppx.     TAC dose was decreased mid 1/2023.    Will check TAC levels. Further recommendations regarding maribavir depends on TAC levels.     Of note, colonoscopy was repeated 10/31/2022 with resolution of the ileal ulcer and no CMV on random biopsies.         Old Problems and Infectious Diseases Issues:   1. SBP and BSI with S mitis 4/29/2021; on IV ABx since 4/29/2021 for possible association with liver abscess.    2. Indeterminate QuantiFeron due to low reaction to mitogen likely due to the patient's underlying liver disease. This patient is low risk for TB, CXR unremarkable; no further evaluations were deemed necessary.     Other Infectious Disease issues include:  - QTc: 465 as of 11/2021.   - PCP prophylaxis: bactrim until 4/24/2022 (5-6 months).   - Serostatus: CMV D+/R-, EBV D+/R+, HSV1?/2?, VZV ?. Currently on maribavir.    - Immunization status: This patient received the fifth dose (second booster, bivalent) of the COVID-19 vaccine on 10/12/2022. Otherwise she needs the second dose of the shingrix vaccine series and the 23-valent pneumonia vaccine. It is better to proceed with these vaccines while on maribavir.   - Gamma globulin status: >1000 as of 10/12/2022.       Attestation:  Total duration of visit including chart review, reviewing labs and imaging, interviewing and examining the patient, documentation, and sending communication to the patient and to the primary treating team, all at the same day of this encounter, is: 30 minutes.   Forest Dasilva MD    Contact information available via [facility] Paging/Directory     12/13/2022           Interim History:   No fever, no diarrhea, no N/V. She continues to have dysgeusia with maribavir but tolerable.  No other events.          History of the Infectious Disease lllness:       Transplants:  11/18/2021 (Liver); Postoperative day:  390    55 yo female with PBC-liver cirrhosis s/p OLT in 11/2021. The patient had mild ACR on a liver biopsy 5/2022, MMF was readminister and TAC/prednisone were maintained.    Also history of UC for which she undergoes screening colonoscopy.   During the last screening colonoscopy on 6/8/2022, she was found to have a single, 2mm cecal ulcer. The biopsy was c/w CMV infection.   MMF was discontinued.   The patient had no fever, diarrhea, N/V or abdominal pain. She was started on PO VGCV, later CMV VL was 4.8 log. Due to lack of symptoms, PO VGCV was continued. Unfortunately, after initial response, VL persisted at low level and did not respond to high dose VGCV. 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 CMV VL recurred while on suppressive therapy with letermovir. Maribavir was resumed with adequate response. CMV-specific immunity was not adequate enough to stop maribavir, yet, an attempt to discontinue maribavir was still done and failed with rising CMV VL within one week of stopping maribavir. Maribavir was again resumed 12/2022.   The patient was asymptomatic all along.     Exposure History:  Lives with her  in a single family house in the [facility] area.   Does some gardening but wear gloves.   Traveled to [facility] in the past.   The only international travel was to [place], to a resort only.   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   Has two grown up children, do not live with her.   Has a dog.          Review of Systems:   As mentioned in the interim history otherwise negative by reviewing constitutional symptoms, central and peripheral neurological systems, respiratory system, cardiac system, GI system,  system, musculoskeletal, skin, allergy, and lymphatics.           Immunizations:     Immunization History   Administered Date(s) Administered     COVID-19 Vaccine 18+ (Moderna) 02/25/2021, 03/18/2021, 04/15/2021, 03/21/2022     COVID-19 Vaccine Bivalent Booster 12+ (Pfizer) 10/12/2022     Flu, Unspecified 10/18/2016, 11/01/2017     HepA-Adult 05/12/2021     HepB-Adult 05/11/2021     Influenza (IIV3) PF 11/10/2010      Influenza Vaccine 50-64 or 18-64 w/egg allergy (Flublok) 10/12/2022     Influenza Vaccine >6 months (Alfuria,Fluzone) 09/03/2015, 12/30/2019, 09/15/2020     Influenza Vaccine, 6+MO IM (QUADRIVALENT W/PRESERVATIVES) 10/17/2018     Pneumo Conj 13-V (2010&after) 06/12/2021     Td (Adult), Adsorbed 03/06/1995, 05/27/2005     Tdap (Adacel,Boostrix) 07/01/2014     Twinrix A/B 06/12/2021     Zoster vaccine recombinant adjuvanted (SHINGRIX) 06/21/2021             Allergies:     Allergies   Allergen Reactions     Diagnostic X-Ray Materials Hives     PATIENT HAD HIVE REACTION AFTER ADMINISTERING CT CONTRAST DYE.       Contrast Dye              Medications:     Current Outpatient Medications   Medication Sig     amLODIPine (NORVASC) 5 MG tablet TAKE 2 TABLETS(10 MG) BY MOUTH DAILY     maribavir (LIVTENCITY) 200 MG tablet Take 2 tablets (400 mg) by mouth 2 times daily     multivitamin (CENTRUM SILVER) tablet Take 1 tablet by mouth daily     tacrolimus (GENERIC EQUIVALENT) 1 MG capsule Take 2 capsules (2 mg) by mouth every 12 hours     No current facility-administered medications for this visit.            Physical Exam:   No vitals are available during this virtual visit.   Constitutional: awake, alert, cooperative, no apparent distress and appears at stated age, well nourished.            Laboratory Data:     No results found for: ACD4    Inflammatory Markers    Recent Labs   Lab Test 11/17/21  1908 08/19/21  0018 06/08/21  1602 06/01/21  0915 05/25/21  1455 05/21/21  1500 05/18/21  1010   CRP 54.0* 40.0* 23.1* 16.1* 14.3* 16.1* 21.4*       Immune Globulin Studies      Recent Labs   Lab Test 10/12/22  0758   IGG 1,145       Metabolic Studies    Recent Labs   Lab Test 12/05/22  0910 11/09/22  0740 10/05/22  0749 09/07/22  0745 08/10/22  0740 08/01/22  1653 07/27/22  0756 12/06/21  1031 12/03/21  1145 11/23/21  0551 11/23/21  0538 11/19/21  0127 11/19/21  0126    142 142 140 142 142 139   < > 142   < >  --    < >  --     POTASSIUM 4.6 4.2 4.3 3.8 3.5 3.8 4.1   < > 4.4   < >  --    < >  --    CHLORIDE 107 112* 113* 112* 111* 111* 111*   < > 113*   < >  --    < >  --    CO2 23 24 21 22 23 22 21   < > 23   < >  --    < >  --    ANIONGAP 11 6 8 6 8 9 7   < > 6   < >  --    < >  --    BUN 18.2 18 22 21 24 31* 23   < > 24   < >  --    < >  --    CR 0.85 0.76 0.81 0.83 0.85 0.81 0.68   < > 0.65   < >  --    < >  --    GFRESTIMATED 78 90 83 81 79 84 >90   < > >90   < >  --    < >  --    GLC 91 92 100* 93 91 93 84   < > 97   < >  --    < >  --    A1C  --   --   --   --   --   --   --   --   --   --   --   --  5.4   MARIELENA 9.9 9.6 9.4 9.0 8.8 9.1 8.7   < > 8.4*   < >  --    < >  --    PHOS 4.0 4.3 4.4 4.0 3.3  --  3.3   < > 3.5   < > 1.9*   < >  --    MAG  --   --   --   --  1.8  --  2.0   < > 1.7   < > 2.2   < >  --    LACT  --   --   --   --   --   --   --   --  0.6*  --  0.8   < >  --     < > = values in this interval not displayed.       Hepatic Studies    Recent Labs   Lab Test 12/05/22  0910 11/09/22  0740 10/05/22  0749 09/07/22  0745 08/10/22  0740 08/01/22  1653   BILITOTAL 0.5 0.3 0.6 0.6 0.6 0.4   ALKPHOS 58 54 66 50 49 52   PROTTOTAL 7.4 7.5 7.7 7.1 6.9 7.4   ALBUMIN 4.3 3.8 3.7 3.6 3.6 3.9   AST 18 15 18 25 21 18   ALT 11 18 17 32 30 28       Hematology Studies     Recent Labs   Lab Test 12/05/22  0910 11/09/22  0740 10/12/22  0758 09/07/22  0745 08/10/22  0740 08/01/22  1653 07/27/22  0756 07/20/22  0805 07/13/22  0758 07/06/22  0801 06/21/21  0938 06/12/21  1259   WBC 6.1 6.0 4.4 4.5 2.2* 2.3* 2.0*   < > 2.3* 2.2*   < > 21.3*   ANEU  --   --   --   --  0.9* 1.4* 1.1*  --  1.4* 1.2*  --  18.5*   ALYM  --   --   --   --  1.2 0.9 0.9  --  0.9 0.9  --  1.1   BRIGIDO  --   --   --   --  0.1 0.0 0.0  --  0.0 0.1  --  1.5*   AEOS  --   --   --   --  0.0 0.0 0.0  --  0.0 0.0  --  0.0   HGB 11.7 11.5* 10.4* 11.7 12.7 12.0 12.4   < > 12.9 12.1   < > 10.3*   HCT 34.0* 33.4* 29.9* 33.6* 37.9 36.2 36.8   < > 38.9 37.9   < > 32.3*    231  181 156 171 156 175   < > 218 204   < > 291    < > = values in this interval not displayed.       Clotting Studies    Recent Labs   Lab Test 05/17/22  1011 12/13/21  1022 12/03/21  1145 11/22/21  0419 11/21/21  0318 11/20/21  0508   INR 1.09 1.12 1.13 1.22* 1.34*  --    PTT  --   --  36 29 31 33       Urine Studies    Recent Labs   Lab Test 07/27/22  0803 12/03/21  1342 11/24/21 2014 11/20/21  1030 11/06/21  0509 10/25/21  1339 09/01/21  1757   URINEPH 7.0 6.0 5.0 5.0 5.5 5.5 5.5   NITRITE Negative Negative Negative Negative Negative Negative Negative   LEUKEST Negative Negative Negative Negative Negative Negative Negative   WBCU  --  1  --  3 1 1 2         Microbiology:  Last 6 Culture results with specimen source  Culture Micro   Date Value Ref Range Status   06/10/2021 No growth  Final   06/10/2021 Canceled, Test credited  Final   06/10/2021 Duplicate request  Final   06/10/2021   Final    Notification of test cancellation was given to  Christine Torres RN from . 6/10/21 at 0812.TV.     06/10/2021 No growth  Final   06/09/2021 No growth  Final    Specimen Description   Date Value Ref Range Status   06/10/2021 Ascites Fluid  Final   06/10/2021 Ascites Fluid  Final   06/10/2021 Midstream Urine  Final   06/10/2021 Midstream Urine  Final   06/09/2021 Blood Left Arm  Final   06/09/2021 Blood PICC  Final      No results found for: CMV    Last check of C difficile  C Diff Toxin B PCR   Date Value Ref Range Status   05/16/2018 Negative NEG^Negative Final     Comment:     Negative: Clostridium difficile target DNA sequences NOT detected, presumed   negative for Clostridium difficile toxin B or the number of bacteria present   may be below the limit of detection for the test.  FDA approved assay performed using [employer] GeneXpert real-time PCR.  A negative result does not exclude actual disease due to Clostridium difficile   and may be due to improper collection, handling and storage of the specimen   or the number of  organisms in the specimen is below the detection limit of the   assay.       C Difficile Toxin B by PCR   Date Value Ref Range Status   12/03/2021 Negative Negative Final     Comment:     A negative result does not exclude actual disease due to C. difficile and may be due to improper collection, handling and storage of the specimen or the number of organisms in the specimen is below the detection limit of the assay.         Virology:  CMV viral loads    CMV viral loads    CMV DNA IU/mL   Date Value Ref Range Status   12/05/2022 <137 (A) Not Detected IU/mL Final     Comment:     CMV DNA detected, less than 137 IU/mL     CMV DNA IU/mL, Instrument   Date Value Ref Range Status   09/14/2022 1,177 (H) <1 IU/mL Final   08/24/2022 181 (H) <1 IU/mL Final   08/17/2022 244 (H) <1 IU/mL Final   08/10/2022 467 (H) <1 IU/mL Final   08/03/2022 211 (H) <1 IU/mL Final   07/27/2022 206 (H) <1 IU/mL Final   07/20/2022 259 (H) <1 IU/mL Final   07/13/2022 932 (H) <1 IU/mL Final   07/06/2022 3,537 (H) <1 IU/mL Final     CMV viral loads    Recent Labs   Lab Test 12/05/22  0910 09/21/22  0758 09/14/22  0747 08/31/22  0748 08/24/22  0740 08/17/22  0750 08/10/22  0740 08/03/22  0753 07/27/22  0756 07/20/22  0805 07/13/22  0758 07/06/22  0801   CMVQNT <137*   < >  --    < >  --   --   --   --   --   --   --   --    CMVRESINST  --   --  1,177*  --  181* 244* 467* 211* 206* 259* 932* 3,537*   CMVLOG <2.1   < > 3.1   < > 2.3 2.4 2.7 2.3 2.3 2.4 3.0 3.5    < > = values in this interval not displayed.       CMV viral loads    CMV DNA IU/mL   Date Value Ref Range Status   12/05/2022 <137 (A) Not Detected IU/mL Final     Comment:     CMV DNA detected, less than 137 IU/mL   11/23/2022 <137 (A) Not Detected IU/mL Final     Comment:     CMV DNA detected, less than 137 IU/mL   11/09/2022 Not Detected Not Detected IU/mL Final   10/26/2022 Not Detected Not Detected IU/mL Final   10/12/2022 <137 (A) Not Detected IU/mL Final     Comment:     CMV DNA  detected, less than 137 IU/mL   10/05/2022 <137 (A) Not Detected IU/mL Final     Comment:     CMV DNA detected, less than 137 IU/mL   09/28/2022 <137 (A) Not Detected IU/mL Final     Comment:     CMV DNA detected, less than 137 IU/mL   09/21/2022 <137 (A) Not Detected IU/mL Final     Comment:     CMV DNA detected, less than 137 IU/mL   09/07/2022 <137 (A) Not Detected IU/mL Final     Comment:     CMV DNA detected, less than 137 IU/mL     CMV DNA IU/mL, Instrument   Date Value Ref Range Status   09/14/2022 1,177 (H) <1 IU/mL Final   08/24/2022 181 (H) <1 IU/mL Final   08/17/2022 244 (H) <1 IU/mL Final   08/10/2022 467 (H) <1 IU/mL Final   08/03/2022 211 (H) <1 IU/mL Final   07/27/2022 206 (H) <1 IU/mL Final   07/20/2022 259 (H) <1 IU/mL Final   07/13/2022 932 (H) <1 IU/mL Final   07/06/2022 3,537 (H) <1 IU/mL Final     CMV log   Date Value Ref Range Status   12/05/2022 <2.1  Final   11/23/2022 <2.1  Final   10/12/2022 <2.1  Final   10/05/2022 <2.1  Final   09/28/2022 <2.1  Final   09/21/2022 <2.1  Final   09/14/2022 3.1  Final   09/07/2022 <2.1  Final   08/31/2022 <2.1  Final   08/24/2022 2.3  Final   08/17/2022 2.4  Final   08/10/2022 2.7  Final   08/03/2022 2.3  Final   07/27/2022 2.3  Final   07/20/2022 2.4  Final   07/13/2022 3.0  Final   07/06/2022 3.5  Final   06/29/2022 3.9  Final   06/22/2022 4.4  Final   06/14/2022 4.8  Final       CMV resistance testing  Recent Labs   Lab Test 07/27/22  0759   CMVCID Sensitive   CMVFOS Sensitive   CMVGAN Resistant     CMV Cidofovir Resist   Date Value Ref Range Status   07/27/2022 Sensitive  Final     CMV Foscarnet Resist   Date Value Ref Range Status   07/27/2022 Sensitive  Final     CMV Ganciclovir Resis   Date Value Ref Range Status   07/27/2022 Resistant  Final     Comment:       The following UL54 mutations were detected: None  The following UL97 mutations were detected: [id number]  INTERPRETIVE INFORMATION: CMV Antiviral Resistance    Codons 457-630 of the UL97 gene and  codons 393-1000 of the   UL54 gene are sequenced. Mutations associated with   resistance to ganciclovir, cidofovir, and foscarnet are   reported. Mutations in viral sub-populations below 20   percent of total may not be detected.    This test was developed and its performance characteristics   determined by [facility]. It has not been cleared or   approved by the US Food and Drug Administration. This test   was performed in a CLIA certified laboratory and is   intended for clinical purposes.  Performed By: [facility]  [address] [place] [id number]  : Ariel Frank MD, PhD        EBV viral loads   No lab results found.  No results found for: EBVDN, EBRES, EBVDN, EBVSP, EBVPC, EBVPCR    Human Herpes Virus 6 viral loads    No results found for: H6RES No results found for: H6SPEC    CMV Antibody IgG   Date Value Ref Range Status   11/17/2021 No detectable antibody. No detectable antibody.  Final   08/18/2021 No detectable antibody. No detectable antibody.  Final     CMV Antibody IgM   Date Value Ref Range Status   11/17/2021 Negative Negative Final   08/18/2021 Negative Negative Final     No results found for: EBIG2, EBIGM, EBVIGG, EBIGG, EBVAGN, KU0770, TOXG      Pathology:  Colon Bx 10/31/2022  Addendum   CMV immunohistochemical stains performed on parts C. - F.  are negative.  Analyte Specific Reagents (ASRs) are used in many laboratory tests necessary for standard medical care and generally do not require FDA approval. This test was developed and its performance characteristics determined by [facility]. It has not been cleared or approved by the U.S. Food and Drug Administration.  [facility] are certified for the performance of high-complexity clinical testing under the Clinical Laboratory Improvement Amendments of 1988 (CLIA), and in keeping with the certification requirements, the laboratory has verified  this test's accuracy, precision and/or validity of the method.      Addendum electronically signed by Jagdish Mccormick DO on 11/2/2022 at  2:45 PM   Final Diagnosis   A.  COLON, ASCENDING, POLYP:  - Colonic mucosa with benign lymphoid aggregates  - No dysplasia or malignancy identified    B.  CECUM, POLYP:  - Colonic mucosa with benign lymphoid aggregates  - No dysplasia or malignancy identified    C.  COLON, CECUM/ASCENDING ASCENDING, BIOPSY:  - Unremarkable colonic mucosa  - No dysplasia or malignancy identified    D.  COLON, TRANSVERSE, BIOPSY:  - Unremarkable colonic mucosa  - No dysplasia or malignancy identified    E.  COLON, DESCENDING/SIGMOID, BIOPSY:  - Unremarkable colonic mucosa  - No dysplasia or malignancy identified    F.  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  CECAL ULCER, BIOPSY :  - Colonic mucosa with ulceration and necroinflammatory debris  - POSITIVE for CMV by immunohistochemistry  - Negative for HSV by immunohistochemistry    B.  CECUM AND ASCENDING COLON, BIOPSIES :  - Colonic mucosa with mild crypt distortion and mild chronic inflammation (Re grade 1)  - Negative for dysplasia      C. HEPATIC FLEXURE AND TRANSVERSE COLON, BIOPSIES :  - Colonic mucosa with mild crypt distortion no significant inflammation (Re grade 0)  - Negative for dysplasia      D. DISTAL AND TRANSVERSE COLON, BIOPSIES :  - Colonic mucosa with no significant inflammation (Re grade 0)  - Negative for dysplasia     E. DESCENDING COLON, BIOPSIES :  - Colonic mucosa with mild crypt distortion and mild patchy chronic inflammation (Re grade 1)  - Negative for dysplasia      F. RECTAL BIOPSIES :  - Colonic mucosa with no significant inflammation (Re grade 0)  - Negative for dysplasia      Liver biopsy 5/17/2022  Final Diagnosis   A.  LIVER, ALLOGRAFT, NEEDLE CORE BIOPSY:  - Mild focal portal  inflammation indeterminate for acute cellular rejection (CHIU: 2-3/9)

## 2023-02-14 NOTE — PATIENT INSTRUCTIONS
Mrs. Nava,   Continue maribavir for now pending the TAC and CMV levels.   Please proceed with to receive the 23-valent pneumonia vaccine and the second shot of the shingrix vaccine.   Please call [phone] to schedule an appointment with me in one month.  Continue to check CMV PCR weekly.    Please call [phone] to schedule an appointment with me in one month, in-person or virtual.     Thank you,   Forest Dasilva MD

## 2023-02-15 ENCOUNTER — TELEPHONE (OUTPATIENT)
Dept: INFECTIOUS DISEASES | Facility: CLINIC | Age: 58
End: 2023-02-15
Payer: COMMERCIAL

## 2023-02-15 LAB — CMV DNA SPEC NAA+PROBE-ACNC: NOT DETECTED IU/ML

## 2023-02-15 NOTE — TELEPHONE ENCOUNTER
Pt scheduled for follow up in 1 mo.  Pt stated she didn't need to schedule for any   labs.  Jacinda Mercado

## 2023-02-17 ENCOUNTER — TELEPHONE (OUTPATIENT)
Dept: INFECTIOUS DISEASES | Facility: CLINIC | Age: 58
End: 2023-02-17
Payer: COMMERCIAL

## 2023-02-17 NOTE — ORAL ONC MGMT
Prior Authorization Approval    Authorization Effective Date: 1/18/2023  Authorization Expiration Date: 4/18/2023  Medication: Livtencity- pa pending  Approved Dose/Quantity: UD  Reference #:     Insurance Company: [employer] - Phone [phone] Fax [phone]  Expected CoPay:       CoPay Card Available:      Foundation Assistance Needed:    Which Pharmacy is filling the prescription (Not needed for infusion/clinic administered): [facility] [place] [address] [address]  Pharmacy Notified: No  Patient Notified: No

## 2023-02-17 NOTE — TELEPHONE ENCOUNTER
PA Initiation    Medication: Livtencity- pa pending  Insurance Company: [employer] - Phone [phone] Fax [phone]  Pharmacy Filling the Rx: [facility] - [place] - [address] [address]  Filling Pharmacy Phone: [phone]  Filling Pharmacy Fax:    Start Date: 2/17/2023

## 2023-02-21 ENCOUNTER — LAB (OUTPATIENT)
Dept: LAB | Facility: CLINIC | Age: 58
End: 2023-02-21
Payer: COMMERCIAL

## 2023-02-21 DIAGNOSIS — B25.9 CYTOMEGALOVIRUS (CMV) VIREMIA (H): ICD-10-CM

## 2023-02-21 PROCEDURE — 36415 COLL VENOUS BLD VENIPUNCTURE: CPT

## 2023-02-22 ENCOUNTER — TELEPHONE (OUTPATIENT)
Dept: TRANSPLANT | Facility: CLINIC | Age: 58
End: 2023-02-22
Payer: COMMERCIAL

## 2023-02-22 DIAGNOSIS — B25.9 CYTOMEGALOVIRUS (CMV) VIREMIA (H): Primary | ICD-10-CM

## 2023-02-22 DIAGNOSIS — Z94.4 LIVER TRANSPLANTED (H): Primary | ICD-10-CM

## 2023-02-22 LAB
CMV DNA SPEC NAA+PROBE-ACNC: <137 IU/ML
CMV DNA SPEC NAA+PROBE-LOG#: <2.1 {LOG_COPIES}/ML

## 2023-02-22 NOTE — TELEPHONE ENCOUNTER
"Care coord re: CMV.  Per Dr. Dasilva's note 2/14 if next tac level around 4 will discontinue maribavir.     Call to Berta to check in.  She spoke to Dr. Dasilva last week.  She is CMV neg again today.  She will be stopping maribavir tomorrow.  She tells me she is feeling better.  Her stamina has improved.  She will continue getting weekly CMV testing.     Berta's  has been ill since October. Now that she feeling better, she wants to \"do things\".  This has been hard for her.     She has no upcoming appt w/ Dr. Leventhal.  He had told her at her last appt that he would like to see her again in Dec 2023.  Appt request placed.   "

## 2023-02-28 ENCOUNTER — LAB (OUTPATIENT)
Dept: LAB | Facility: CLINIC | Age: 58
End: 2023-02-28
Payer: COMMERCIAL

## 2023-02-28 DIAGNOSIS — B25.9 CYTOMEGALOVIRUS (CMV) VIREMIA (H): ICD-10-CM

## 2023-02-28 PROCEDURE — 36415 COLL VENOUS BLD VENIPUNCTURE: CPT

## 2023-03-01 ENCOUNTER — TELEPHONE (OUTPATIENT)
Dept: TRANSPLANT | Facility: CLINIC | Age: 58
End: 2023-03-01
Payer: COMMERCIAL

## 2023-03-01 LAB
CMV DNA SPEC NAA+PROBE-ACNC: <137 IU/ML
CMV DNA SPEC NAA+PROBE-LOG#: <2.1 {LOG_COPIES}/ML

## 2023-03-01 NOTE — TELEPHONE ENCOUNTER
Call to Berta to review CMV result.  She stopped the maribavir on 2/15.  She has developed a rash on her forehead and cheeks which is red, bumpy and itchy, red bumps on her legs and has a red   Splotch above her left butt cheek.  She started a new face and eye cream a week or 2 ago, she also had the shingrix and pneumococcal vaccines on 2/15.  She isn't sure of the time line of the appearance of red bump / itchiness in relation to the new cream or vaccinations.  I suggested she stop the face / eye cream, told her she can take benadryl OTC prn and see if rash / itching goes away.  Message to Dr. Dasilva asking about frequency of CMV PCR.

## 2023-03-02 ENCOUNTER — TELEPHONE (OUTPATIENT)
Dept: TRANSPLANT | Facility: CLINIC | Age: 58
End: 2023-03-02
Payer: COMMERCIAL

## 2023-03-02 DIAGNOSIS — Z94.4 S/P LIVER TRANSPLANT (H): Primary | ICD-10-CM

## 2023-03-02 NOTE — TELEPHONE ENCOUNTER
Message from Dr. Dasilva:    Forest Dasilva MD sent to Vanessa Durant, RN  Caller: Unspecified (Yesterday,  2:33 PM)  Jevon Mendez,   I am sorry to hear that Berta is having an allergic reaction.   I don't think we can ever tell whether this a reaction to the shingrix or the pneumovax. I doubt that this reaction is due to the cream since the skin eruption is also on the buttock and legs where she does not apply the cream.   I agree with the benadryl.   We should continue to check CMV PCR weekly until 3/11/23 if all of them are <135 or undetected, we should be able to check monthly or every other week.

## 2023-03-03 NOTE — TELEPHONE ENCOUNTER
Spoke to pt and     Please call Berta and telll her that Dr. Dasilva got back to me w/ a plan.  Please read the emboldened text from his message to her and make sure she has orders for the CMV PCR per his recommendation.  Let's go with every other week for 2 mos, then monthly x 6 mos then I can readdress.   Thanks!     Informed pt that we would like one more CMV drawn next week then the schedule as above. Pt understood.

## 2023-03-07 ENCOUNTER — LAB (OUTPATIENT)
Dept: LAB | Facility: CLINIC | Age: 58
End: 2023-03-07
Payer: COMMERCIAL

## 2023-03-07 DIAGNOSIS — Z94.4 LIVER REPLACED BY TRANSPLANT (H): ICD-10-CM

## 2023-03-07 DIAGNOSIS — B25.9 CYTOMEGALOVIRUS (CMV) VIREMIA (H): ICD-10-CM

## 2023-03-07 DIAGNOSIS — Z94.4 S/P LIVER TRANSPLANT (H): ICD-10-CM

## 2023-03-07 DIAGNOSIS — Z94.4 LIVER TRANSPLANTED (H): ICD-10-CM

## 2023-03-07 LAB
ALBUMIN MFR UR ELPH: 10.6 MG/DL (ref 1–14)
ALBUMIN SERPL BCG-MCNC: 4.4 G/DL (ref 3.5–5.2)
ALBUMIN UR-MCNC: NEGATIVE MG/DL
ALP SERPL-CCNC: 64 U/L (ref 35–104)
ALT SERPL W P-5'-P-CCNC: 19 U/L (ref 10–35)
ANION GAP SERPL CALCULATED.3IONS-SCNC: 11 MMOL/L (ref 7–15)
APPEARANCE UR: CLEAR
AST SERPL W P-5'-P-CCNC: 24 U/L (ref 10–35)
BASOPHILS # BLD AUTO: 0 10E3/UL (ref 0–0.2)
BASOPHILS NFR BLD AUTO: 0 %
BILIRUB DIRECT SERPL-MCNC: <0.2 MG/DL (ref 0–0.3)
BILIRUB SERPL-MCNC: 0.4 MG/DL
BILIRUB UR QL STRIP: NEGATIVE
BUN SERPL-MCNC: 14.9 MG/DL (ref 8–23)
CALCIUM SERPL-MCNC: 9.6 MG/DL (ref 8.6–10)
CHLORIDE SERPL-SCNC: 106 MMOL/L (ref 98–107)
CHOLEST SERPL-MCNC: 244 MG/DL
COLOR UR AUTO: YELLOW
CREAT SERPL-MCNC: 0.76 MG/DL (ref 0.51–0.95)
CREAT UR-MCNC: 92.7 MG/DL
DEPRECATED HCO3 PLAS-SCNC: 21 MMOL/L (ref 22–29)
EOSINOPHIL # BLD AUTO: 0.2 10E3/UL (ref 0–0.7)
EOSINOPHIL NFR BLD AUTO: 3 %
ERYTHROCYTE [DISTWIDTH] IN BLOOD BY AUTOMATED COUNT: 14.5 % (ref 10–15)
GFR SERPL CREATININE-BSD FRML MDRD: 90 ML/MIN/1.73M2
GLUCOSE SERPL-MCNC: 127 MG/DL (ref 70–99)
GLUCOSE UR STRIP-MCNC: NEGATIVE MG/DL
HCT VFR BLD AUTO: 36.4 % (ref 35–47)
HDLC SERPL-MCNC: 70 MG/DL
HGB BLD-MCNC: 12.5 G/DL (ref 11.7–15.7)
HGB UR QL STRIP: NEGATIVE
IMM GRANULOCYTES # BLD: 0 10E3/UL
IMM GRANULOCYTES NFR BLD: 0 %
KETONES UR STRIP-MCNC: NEGATIVE MG/DL
LDLC SERPL CALC-MCNC: 155 MG/DL
LEUKOCYTE ESTERASE UR QL STRIP: NEGATIVE
LYMPHOCYTES # BLD AUTO: 4.5 10E3/UL (ref 0.8–5.3)
LYMPHOCYTES NFR BLD AUTO: 63 %
MCH RBC QN AUTO: 28.7 PG (ref 26.5–33)
MCHC RBC AUTO-ENTMCNC: 34.3 G/DL (ref 31.5–36.5)
MCV RBC AUTO: 84 FL (ref 78–100)
MONOCYTES # BLD AUTO: 0.3 10E3/UL (ref 0–1.3)
MONOCYTES NFR BLD AUTO: 5 %
NEUTROPHILS # BLD AUTO: 2.1 10E3/UL (ref 1.6–8.3)
NEUTROPHILS NFR BLD AUTO: 29 %
NITRATE UR QL: NEGATIVE
NONHDLC SERPL-MCNC: 174 MG/DL
NRBC # BLD AUTO: 0 10E3/UL
NRBC BLD AUTO-RTO: 0 /100
PH UR STRIP: 6.5 [PH] (ref 5–7)
PLATELET # BLD AUTO: 234 10E3/UL (ref 150–450)
POTASSIUM SERPL-SCNC: 3.8 MMOL/L (ref 3.4–5.3)
PROT SERPL-MCNC: 7.5 G/DL (ref 6.4–8.3)
PROT/CREAT 24H UR: 0.11 MG/MG CR (ref 0–0.2)
RBC # BLD AUTO: 4.35 10E6/UL (ref 3.8–5.2)
SODIUM SERPL-SCNC: 138 MMOL/L (ref 136–145)
SP GR UR STRIP: >=1.03 (ref 1–1.03)
TACROLIMUS BLD-MCNC: 2.8 UG/L (ref 5–15)
TME LAST DOSE: ABNORMAL H
TME LAST DOSE: ABNORMAL H
TRIGL SERPL-MCNC: 93 MG/DL
UROBILINOGEN UR STRIP-ACNC: 0.2 E.U./DL
WBC # BLD AUTO: 7.3 10E3/UL (ref 4–11)

## 2023-03-07 PROCEDURE — 80061 LIPID PANEL: CPT

## 2023-03-07 PROCEDURE — 81003 URINALYSIS AUTO W/O SCOPE: CPT

## 2023-03-07 PROCEDURE — 85025 COMPLETE CBC W/AUTO DIFF WBC: CPT

## 2023-03-07 PROCEDURE — 36415 COLL VENOUS BLD VENIPUNCTURE: CPT

## 2023-03-07 PROCEDURE — 82248 BILIRUBIN DIRECT: CPT

## 2023-03-07 PROCEDURE — 87517 HEPATITIS B DNA QUANT: CPT

## 2023-03-07 PROCEDURE — 80197 ASSAY OF TACROLIMUS: CPT

## 2023-03-07 PROCEDURE — 84156 ASSAY OF PROTEIN URINE: CPT

## 2023-03-07 PROCEDURE — 80053 COMPREHEN METABOLIC PANEL: CPT

## 2023-03-08 LAB
CMV DNA SPEC NAA+PROBE-ACNC: <137 IU/ML
CMV DNA SPEC NAA+PROBE-LOG#: <2.1 {LOG_COPIES}/ML

## 2023-03-10 LAB — HBV DNA SERPL NAA+PROBE-ACNC: NOT DETECTED IU/ML

## 2023-03-10 ASSESSMENT — ENCOUNTER SYMPTOMS
CONSTIPATION: 0
DIZZINESS: 0
EYE PAIN: 0
NAUSEA: 0
BREAST MASS: 0
CHILLS: 0
DIARRHEA: 0
MYALGIAS: 1
HEMATOCHEZIA: 0
PALPITATIONS: 0
FEVER: 0
PARESTHESIAS: 0
HEARTBURN: 0
FREQUENCY: 0
SHORTNESS OF BREATH: 0
ABDOMINAL PAIN: 0
NERVOUS/ANXIOUS: 0
WEAKNESS: 0
HEADACHES: 0
ARTHRALGIAS: 1
HEMATURIA: 0
SORE THROAT: 0
COUGH: 0
JOINT SWELLING: 0
DYSURIA: 0

## 2023-03-13 ENCOUNTER — LAB (OUTPATIENT)
Dept: LAB | Facility: CLINIC | Age: 58
End: 2023-03-13
Payer: COMMERCIAL

## 2023-03-13 DIAGNOSIS — B25.9 CYTOMEGALOVIRUS (CMV) VIREMIA (H): ICD-10-CM

## 2023-03-13 PROCEDURE — 36415 COLL VENOUS BLD VENIPUNCTURE: CPT

## 2023-03-14 ENCOUNTER — VIRTUAL VISIT (OUTPATIENT)
Dept: INFECTIOUS DISEASES | Facility: CLINIC | Age: 58
End: 2023-03-14
Attending: INTERNAL MEDICINE
Payer: COMMERCIAL

## 2023-03-14 DIAGNOSIS — B25.9 CYTOMEGALOVIRUS (CMV) VIREMIA (H): Primary | ICD-10-CM

## 2023-03-14 DIAGNOSIS — Z16.33 INFECTION DUE TO ANTIMICROBIAL RESISTANT VIRUS: ICD-10-CM

## 2023-03-14 DIAGNOSIS — B34.9 INFECTION DUE TO ANTIMICROBIAL RESISTANT VIRUS: ICD-10-CM

## 2023-03-14 DIAGNOSIS — Z53.9 ERRONEOUS ENCOUNTER--DISREGARD: ICD-10-CM

## 2023-03-14 DIAGNOSIS — R21 SKIN RASH: ICD-10-CM

## 2023-03-14 DIAGNOSIS — Z94.4 LIVER REPLACED BY TRANSPLANT (H): ICD-10-CM

## 2023-03-14 LAB
CMV DNA SPEC NAA+PROBE-ACNC: <137 IU/ML
CMV DNA SPEC NAA+PROBE-LOG#: <2.1 {LOG_COPIES}/ML

## 2023-03-14 PROCEDURE — G0463 HOSPITAL OUTPT CLINIC VISIT: HCPCS | Mod: PN,GT | Performed by: INTERNAL MEDICINE

## 2023-03-14 PROCEDURE — 99215 OFFICE O/P EST HI 40 MIN: CPT | Mod: VID | Performed by: INTERNAL MEDICINE

## 2023-03-14 NOTE — LETTER
Date:March 15, 2023      Provider requested that no letter be sent. Do not send.       [facility]

## 2023-03-14 NOTE — NURSING NOTE
Is the patient currently in the state of [place]? YES    Visit mode:VIDEO    If the visit is dropped, the patient can be reconnected by: VIDEO VISIT: Text to cell phone: [phone]    Will anyone else be joining the visit? NO      How would you like to obtain your AVS? MyChart    Are changes needed to the allergy or medication list? YES: Pt wants to discuss MARIBAVIR    Reason for visit: Follow up and medication concern    Radha LOVE

## 2023-03-14 NOTE — LETTER
3/14/2023       RE: Berta Nava  [address] [facility] [address]     Dear Colleague,    Thank you for referring your patient, Breta Nava, to the [facility] INFECTIOUS DISEASE CLINIC [place] at [facility]. Please see a copy of my visit note below.    Video-Visit Details    Type of service:  Video Visit    Video Start Time (time video started): 5:59 PM    Video End Time (time video stopped): 6:10 PM     Originating Location (pt. Location): Home    Distant Location (provider location):  On-site    Mode of Communication:  Video Conference via [facility]    Transplant Infectious Diseases Outpatient Progress Note     Patient:  Berta Nava, Date of birth 1965, Medical record number [id number]  Date of Visit:  12/13/2022           Recommendations:   1. Continue to hold maribavir.   2. If next CMV PCR is either undetected or <137, we can then start checking CMV PCR every other week for one month.   3. The patient will call her dermatologist for an appointment.     RTC: depends on rash and CMV values.          Summary of Presentation:   Transplants:  11/18/2021 (Liver), Postoperative day:  390     This patient is a 56 year old female with PBC-liver cirrhosis s/p OLT in 11/2021. Was on TAC/prednisone.   ?rejection 5/2022, MMF was added to TAC/prednisone.    IS deescalated to TAC/prednisone in 6/2022 due to CMV infection then TAC due to recurrent resistant CMV viremia.   History of UC with screening colonoscopy showing an evidence of CMV colitis. CMV PCR checked and was at 4.8 log 6/2022.         Active Problems and Infectious Diseases Issues:   1. Recurrent CMV viremia.   Started as a primary, donor-derived CMV viremia and colitis following an escalation of IS due to mild rejection. MMF was later discontinued.  CMV-specific CD4 and CD8 were checked on 10/7/2022; the results showed significantly  low CD4 and reasonable (on the low side) CD8. An attempt to stop maribavir failed. CMV viremia recurred very shortly after stopping maribavir and while on letermovir for secondary ppx. Maribavir was resumed until 2/22/23 after TAC targeted trough level was decreased.      If next CMV PCR still undetected, or <2.1 log, will move to every other week for one month.     Of note, colonoscopy was repeated 10/31/2022 with resolution of the ileal ulcer and no CMV on random biopsies.     2. Maculopapular, pruritic rash on arms, legs and papular rash on face.   I doubt infectious process while on lower IS therapy.   The rash may have preceded the shingrix and pneumovax but patient is not sure. However, allergy to these vaccines is rare and rash is not resolving a month later.   I doubt reaction to maribavir as rash is not improving 3 weeks after stopping maribavir.  The patient will follow with dermatology.         Old Problems and Infectious Diseases Issues:   1. SBP and BSI with S mitis 4/29/2021; on IV ABx since 4/29/2021 for possible association with liver abscess.    2. Indeterminate QuantiFeron due to low reaction to mitogen likely due to the patient's underlying liver disease. This patient is low risk for TB, CXR unremarkable; no further evaluations were deemed necessary.     Other Infectious Disease issues include:  - QTc: 465 as of 11/2021.   - PCP prophylaxis: bactrim until 4/24/2022 (5-6 months).   - Serostatus: CMV D+/R-, EBV D+/R+, HSV1?/2?, VZV ?. Currently on maribavir.    - Immunization status: This patient received the fifth dose (second booster, bivalent) of the COVID-19 vaccine on 10/12/2022. Otherwise she needs the second dose of the shingrix vaccine series and the 23-valent pneumonia vaccine. It is better to proceed with these vaccines while on maribavir.   - Gamma globulin status: >1000 as of 10/12/2022.       Attestation:  Total duration of visit including chart review, reviewing labs and imaging,  interviewing and examining the patient, documentation, and sending communication to the patient and to the primary treating team, all at the same day of this encounter, is: 40 minutes.   Forest Dasilva MD    Contact information available via [facility] Paging/Directory     12/13/2022           Interim History:   Still with pruritic rash on arms and legs, and non-pruritic rash on face. No fever. No enlarged LN.   No other complaints.   The dysgeusia has improved after stopping maribavir but not yet resolved.         History of the Infectious Disease lllness:       Transplants:  11/18/2021 (Liver); Postoperative day:  390    57 yo female with PBC-liver cirrhosis s/p OLT in 11/2021. The patient had mild ACR on a liver biopsy 5/2022, MMF was readminister and TAC/prednisone were maintained.   Also history of UC for which she undergoes screening colonoscopy.   During the last screening colonoscopy on 6/8/2022, she was found to have a single, 2mm cecal ulcer. The biopsy was c/w CMV infection.   MMF was discontinued.   The patient had no fever, diarrhea, N/V or abdominal pain. She was started on PO VGCV, later CMV VL was 4.8 log. Due to lack of symptoms, PO VGCV was continued. Unfortunately, after initial response, VL persisted at low level and did not respond to high dose VGCV. 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 CMV VL recurred while on suppressive therapy with letermovir. Maribavir was resumed with adequate response. CMV-specific immunity was not adequate enough to stop maribavir, yet, an attempt to discontinue maribavir was still done and failed with rising CMV VL within one week of stopping maribavir. Maribavir was again resumed 12/2022.   The patient was asymptomatic all along.     Exposure History:  Lives with her  in a single family house in the [facility] area.   Does some gardening but wear gloves.   Traveled to [facility] in the past.    The only international travel was to [place], to a resort only.   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   Has two grown up children, do not live with her.   Has a dog.          Review of Systems:   As mentioned in the interim history otherwise negative by reviewing constitutional symptoms, central and peripheral neurological systems, respiratory system, cardiac system, GI system,  system, musculoskeletal, skin, allergy, and lymphatics.           Immunizations:     Immunization History   Administered Date(s) Administered     COVID-19 Vaccine 18+ (Moderna) 02/25/2021, 03/18/2021, 04/15/2021, 03/21/2022     COVID-19 Vaccine Bivalent Booster 12+ (Pfizer) 10/12/2022     Flu, Unspecified 10/18/2016, 11/01/2017     HepA-Adult 05/12/2021     HepB-Adult 05/11/2021     Influenza (IIV3) PF 11/10/2010     Influenza Vaccine 50-64 or 18-64 w/egg allergy (Flublok) 10/12/2022     Influenza Vaccine >6 months (Alfuria,Fluzone) 09/03/2015, 12/30/2019, 09/15/2020     Influenza Vaccine, 6+MO IM (QUADRIVALENT W/PRESERVATIVES) 10/17/2018     Pneumo Conj 13-V (2010&after) 06/12/2021     Td (Adult), Adsorbed 03/06/1995, 05/27/2005     Tdap (Adacel,Boostrix) 07/01/2014     Twinrix A/B 06/12/2021     Zoster vaccine recombinant adjuvanted (SHINGRIX) 06/21/2021             Allergies:     Allergies   Allergen Reactions     Diagnostic X-Ray Materials Hives     PATIENT HAD HIVE REACTION AFTER ADMINISTERING CT CONTRAST DYE.       Contrast Dye              Medications:     Current Outpatient Medications   Medication Sig     amLODIPine (NORVASC) 5 MG tablet TAKE 2 TABLETS(10 MG) BY MOUTH DAILY     maribavir (LIVTENCITY) 200 MG tablet Take 2 tablets (400 mg) by mouth 2 times daily     multivitamin (CENTRUM SILVER) tablet Take 1 tablet by mouth daily     tacrolimus (GENERIC EQUIVALENT) 1 MG capsule Take 2 capsules (2 mg) by mouth every 12 hours     No current facility-administered medications for this visit.            Physical Exam:   No vitals are available  during this virtual visit.   Constitutional: awake, alert, cooperative, no apparent distress and appears at stated age, well nourished.   Skin and face: face with erythematous papular rash, leges and arms bilaterally with erythematous maculopapular rash.            Laboratory Data:     No results found for: ACD4    Inflammatory Markers    Recent Labs   Lab Test 11/17/21  1908 08/19/21  0018 06/08/21  1602 06/01/21  0915 05/25/21  1455 05/21/21  1500 05/18/21  1010   CRP 54.0* 40.0* 23.1* 16.1* 14.3* 16.1* 21.4*       Immune Globulin Studies      Recent Labs   Lab Test 10/12/22  0758   IGG 1,145       Metabolic Studies    Recent Labs   Lab Test 12/05/22  0910 11/09/22  0740 10/05/22  0749 09/07/22  0745 08/10/22  0740 08/01/22  1653 07/27/22  0756 12/06/21  1031 12/03/21  1145 11/23/21  0551 11/23/21  0538 11/19/21  0127 11/19/21  0126    142 142 140 142 142 139   < > 142   < >  --    < >  --    POTASSIUM 4.6 4.2 4.3 3.8 3.5 3.8 4.1   < > 4.4   < >  --    < >  --    CHLORIDE 107 112* 113* 112* 111* 111* 111*   < > 113*   < >  --    < >  --    CO2 23 24 21 22 23 22 21   < > 23   < >  --    < >  --    ANIONGAP 11 6 8 6 8 9 7   < > 6   < >  --    < >  --    BUN 18.2 18 22 21 24 31* 23   < > 24   < >  --    < >  --    CR 0.85 0.76 0.81 0.83 0.85 0.81 0.68   < > 0.65   < >  --    < >  --    GFRESTIMATED 78 90 83 81 79 84 >90   < > >90   < >  --    < >  --    GLC 91 92 100* 93 91 93 84   < > 97   < >  --    < >  --    A1C  --   --   --   --   --   --   --   --   --   --   --   --  5.4   MARIELENA 9.9 9.6 9.4 9.0 8.8 9.1 8.7   < > 8.4*   < >  --    < >  --    PHOS 4.0 4.3 4.4 4.0 3.3  --  3.3   < > 3.5   < > 1.9*   < >  --    MAG  --   --   --   --  1.8  --  2.0   < > 1.7   < > 2.2   < >  --    LACT  --   --   --   --   --   --   --   --  0.6*  --  0.8   < >  --     < > = values in this interval not displayed.       Hepatic Studies    Recent Labs   Lab Test 12/05/22  0910 11/09/22  0740 10/05/22  0749 09/07/22  0745  08/10/22  0740 08/01/22  1653   BILITOTAL 0.5 0.3 0.6 0.6 0.6 0.4   ALKPHOS 58 54 66 50 49 52   PROTTOTAL 7.4 7.5 7.7 7.1 6.9 7.4   ALBUMIN 4.3 3.8 3.7 3.6 3.6 3.9   AST 18 15 18 25 21 18   ALT 11 18 17 32 30 28       Hematology Studies     Recent Labs   Lab Test 12/05/22  0910 11/09/22  0740 10/12/22  0758 09/07/22  0745 08/10/22  0740 08/01/22  1653 07/27/22  0756 07/20/22  0805 07/13/22  0758 07/06/22  0801 06/21/21  0938 06/12/21  1259   WBC 6.1 6.0 4.4 4.5 2.2* 2.3* 2.0*   < > 2.3* 2.2*   < > 21.3*   ANEU  --   --   --   --  0.9* 1.4* 1.1*  --  1.4* 1.2*  --  18.5*   ALYM  --   --   --   --  1.2 0.9 0.9  --  0.9 0.9  --  1.1   BRIGIDO  --   --   --   --  0.1 0.0 0.0  --  0.0 0.1  --  1.5*   AEOS  --   --   --   --  0.0 0.0 0.0  --  0.0 0.0  --  0.0   HGB 11.7 11.5* 10.4* 11.7 12.7 12.0 12.4   < > 12.9 12.1   < > 10.3*   HCT 34.0* 33.4* 29.9* 33.6* 37.9 36.2 36.8   < > 38.9 37.9   < > 32.3*    231 181 156 171 156 175   < > 218 204   < > 291    < > = values in this interval not displayed.       Clotting Studies    Recent Labs   Lab Test 05/17/22  1011 12/13/21  1022 12/03/21  1145 11/22/21  0419 11/21/21  0318 11/20/21  0508   INR 1.09 1.12 1.13 1.22* 1.34*  --    PTT  --   --  36 29 31 33       Urine Studies    Recent Labs   Lab Test 07/27/22  0803 12/03/21  1342 11/24/21 2014 11/20/21  1030 11/06/21  0509 10/25/21  1339 09/01/21  1757   URINEPH 7.0 6.0 5.0 5.0 5.5 5.5 5.5   NITRITE Negative Negative Negative Negative Negative Negative Negative   LEUKEST Negative Negative Negative Negative Negative Negative Negative   WBCU  --  1  --  3 1 1 2         Microbiology:  Last 6 Culture results with specimen source  Culture Micro   Date Value Ref Range Status   06/10/2021 No growth  Final   06/10/2021 Canceled, Test credited  Final   06/10/2021 Duplicate request  Final   06/10/2021   Final    Notification of test cancellation was given to  Christine Torres RN from [facility]. 6/10/21 at 0812.TV.     06/10/2021 No growth   Final   06/09/2021 No growth  Final    Specimen Description   Date Value Ref Range Status   06/10/2021 Ascites Fluid  Final   06/10/2021 Ascites Fluid  Final   06/10/2021 Midstream Urine  Final   06/10/2021 Midstream Urine  Final   06/09/2021 Blood Left Arm  Final   06/09/2021 Blood PICC  Final      No results found for: CMV    Last check of C difficile  C Diff Toxin B PCR   Date Value Ref Range Status   05/16/2018 Negative NEG^Negative Final     Comment:     Negative: Clostridium difficile target DNA sequences NOT detected, presumed   negative for Clostridium difficile toxin B or the number of bacteria present   may be below the limit of detection for the test.  FDA approved assay performed using SCON[employer] DIGITALE real-time PCR.  A negative result does not exclude actual disease due to Clostridium difficile   and may be due to improper collection, handling and storage of the specimen   or the number of organisms in the specimen is below the detection limit of the   assay.       C Difficile Toxin B by PCR   Date Value Ref Range Status   12/03/2021 Negative Negative Final     Comment:     A negative result does not exclude actual disease due to C. difficile and may be due to improper collection, handling and storage of the specimen or the number of organisms in the specimen is below the detection limit of the assay.         Virology:  CMV viral loads    CMV viral loads    CMV DNA IU/mL   Date Value Ref Range Status   12/05/2022 <137 (A) Not Detected IU/mL Final     Comment:     CMV DNA detected, less than 137 IU/mL     CMV DNA IU/mL, Instrument   Date Value Ref Range Status   09/14/2022 1,177 (H) <1 IU/mL Final   08/24/2022 181 (H) <1 IU/mL Final   08/17/2022 244 (H) <1 IU/mL Final   08/10/2022 467 (H) <1 IU/mL Final   08/03/2022 211 (H) <1 IU/mL Final   07/27/2022 206 (H) <1 IU/mL Final   07/20/2022 259 (H) <1 IU/mL Final   07/13/2022 932 (H) <1 IU/mL Final   07/06/2022 3,537 (H) <1 IU/mL Final     CMV viral loads     Recent Labs   Lab Test 12/05/22  0910 09/21/22  0758 09/14/22  0747 08/31/22  0748 08/24/22  0740 08/17/22  0750 08/10/22  0740 08/03/22  0753 07/27/22  0756 07/20/22  0805 07/13/22  0758 07/06/22  0801   CMVQNT <137*   < >  --    < >  --   --   --   --   --   --   --   --    CMVRESINST  --   --  1,177*  --  181* 244* 467* 211* 206* 259* 932* 3,537*   CMVLOG <2.1   < > 3.1   < > 2.3 2.4 2.7 2.3 2.3 2.4 3.0 3.5    < > = values in this interval not displayed.       CMV viral loads    CMV DNA IU/mL   Date Value Ref Range Status   12/05/2022 <137 (A) Not Detected IU/mL Final     Comment:     CMV DNA detected, less than 137 IU/mL   11/23/2022 <137 (A) Not Detected IU/mL Final     Comment:     CMV DNA detected, less than 137 IU/mL   11/09/2022 Not Detected Not Detected IU/mL Final   10/26/2022 Not Detected Not Detected IU/mL Final   10/12/2022 <137 (A) Not Detected IU/mL Final     Comment:     CMV DNA detected, less than 137 IU/mL   10/05/2022 <137 (A) Not Detected IU/mL Final     Comment:     CMV DNA detected, less than 137 IU/mL   09/28/2022 <137 (A) Not Detected IU/mL Final     Comment:     CMV DNA detected, less than 137 IU/mL   09/21/2022 <137 (A) Not Detected IU/mL Final     Comment:     CMV DNA detected, less than 137 IU/mL   09/07/2022 <137 (A) Not Detected IU/mL Final     Comment:     CMV DNA detected, less than 137 IU/mL     CMV DNA IU/mL, Instrument   Date Value Ref Range Status   09/14/2022 1,177 (H) <1 IU/mL Final   08/24/2022 181 (H) <1 IU/mL Final   08/17/2022 244 (H) <1 IU/mL Final   08/10/2022 467 (H) <1 IU/mL Final   08/03/2022 211 (H) <1 IU/mL Final   07/27/2022 206 (H) <1 IU/mL Final   07/20/2022 259 (H) <1 IU/mL Final   07/13/2022 932 (H) <1 IU/mL Final   07/06/2022 3,537 (H) <1 IU/mL Final     CMV log   Date Value Ref Range Status   12/05/2022 <2.1  Final   11/23/2022 <2.1  Final   10/12/2022 <2.1  Final   10/05/2022 <2.1  Final   09/28/2022 <2.1  Final   09/21/2022 <2.1  Final   09/14/2022 3.1   Final   09/07/2022 <2.1  Final   08/31/2022 <2.1  Final   08/24/2022 2.3  Final   08/17/2022 2.4  Final   08/10/2022 2.7  Final   08/03/2022 2.3  Final   07/27/2022 2.3  Final   07/20/2022 2.4  Final   07/13/2022 3.0  Final   07/06/2022 3.5  Final   06/29/2022 3.9  Final   06/22/2022 4.4  Final   06/14/2022 4.8  Final       CMV resistance testing  Recent Labs   Lab Test 07/27/22  0759   CMVCID Sensitive   CMVFOS Sensitive   CMVGAN Resistant     CMV Cidofovir Resist   Date Value Ref Range Status   07/27/2022 Sensitive  Final     CMV Foscarnet Resist   Date Value Ref Range Status   07/27/2022 Sensitive  Final     CMV Ganciclovir Resis   Date Value Ref Range Status   07/27/2022 Resistant  Final     Comment:       The following UL54 mutations were detected: None  The following UL97 mutations were detected: [id number]  INTERPRETIVE INFORMATION: CMV Antiviral Resistance    Codons 457-630 of the UL97 gene and codons 393-1000 of the   UL54 gene are sequenced. Mutations associated with   resistance to ganciclovir, cidofovir, and foscarnet are   reported. Mutations in viral sub-populations below 20   percent of total may not be detected.    This test was developed and its performance characteristics   determined by [email or web address]. It has not been cleared or   approved by the US Food and Drug Administration. This test   was performed in a CLIA certified laboratory and is   intended for clinical purposes.  Performed By: [email or web address]  [address] [place] [id number]  : Ariel Frank MD, PhD        EBV viral loads   No lab results found.  No results found for: EBVDN, EBRES, EBVDN, EBVSP, EBVPC, EBVPCR    Human Herpes Virus 6 viral loads    No results found for: H6RES No results found for: H6SPEC    CMV Antibody IgG   Date Value Ref Range Status   11/17/2021 No detectable antibody. No detectable antibody.  Final   08/18/2021 No detectable antibody. No detectable antibody.  Final     CMV  Antibody IgM   Date Value Ref Range Status   11/17/2021 Negative Negative Final   08/18/2021 Negative Negative Final     No results found for: EBIG2, EBIGM, EBVIGG, EBIGG, EBVAGN, BQ0328, TOXG      Pathology:  Colon Bx 10/31/2022  Addendum   CMV immunohistochemical stains performed on parts C. - F.  are negative.  Analyte Specific Reagents (ASRs) are used in many laboratory tests necessary for standard medical care and generally do not require FDA approval. This test was developed and its performance characteristics determined by [facility]. It has not been cleared or approved by the U.S. Food and Drug Administration.  [facility] are certified for the performance of high-complexity clinical testing under the Clinical Laboratory Improvement Amendments of 1988 (CLIA), and in keeping with the certification requirements, the laboratory has verified this test's accuracy, precision and/or validity of the method.      Addendum electronically signed by Jagdish Mccormick DO on 11/2/2022 at  2:45 PM   Final Diagnosis   A.  COLON, ASCENDING, POLYP:  - Colonic mucosa with benign lymphoid aggregates  - No dysplasia or malignancy identified    B.  CECUM, POLYP:  - Colonic mucosa with benign lymphoid aggregates  - No dysplasia or malignancy identified    C.  COLON, CECUM/ASCENDING ASCENDING, BIOPSY:  - Unremarkable colonic mucosa  - No dysplasia or malignancy identified    D.  COLON, TRANSVERSE, BIOPSY:  - Unremarkable colonic mucosa  - No dysplasia or malignancy identified    E.  COLON, DESCENDING/SIGMOID, BIOPSY:  - Unremarkable colonic mucosa  - No dysplasia or malignancy identified    F.  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  CECAL ULCER, BIOPSY :  - Colonic mucosa with ulceration and necroinflammatory debris  - POSITIVE for CMV by  immunohistochemistry  - Negative for HSV by immunohistochemistry    B.  CECUM AND ASCENDING COLON, BIOPSIES :  - Colonic mucosa with mild crypt distortion and mild chronic inflammation (Re grade 1)  - Negative for dysplasia      C. HEPATIC FLEXURE AND TRANSVERSE COLON, BIOPSIES :  - Colonic mucosa with mild crypt distortion no significant inflammation (Re grade 0)  - Negative for dysplasia      D. DISTAL AND TRANSVERSE COLON, BIOPSIES :  - Colonic mucosa with no significant inflammation (Re grade 0)  - Negative for dysplasia     E. DESCENDING COLON, BIOPSIES :  - Colonic mucosa with mild crypt distortion and mild patchy chronic inflammation (Re grade 1)  - Negative for dysplasia      F. RECTAL BIOPSIES :  - Colonic mucosa with no significant inflammation (Re grade 0)  - Negative for dysplasia      Liver biopsy 5/17/2022  Final Diagnosis   A.  LIVER, ALLOGRAFT, NEEDLE CORE BIOPSY:  - Mild focal portal inflammation indeterminate for acute cellular rejection (CHIU: 2-3/9)       Again, thank you for allowing me to participate in the care of your patient.      Sincerely,    Forest Dasilva MD

## 2023-03-14 NOTE — PROGRESS NOTES
Video-Visit Details    Type of service:  Video Visit    Video Start Time (time video started): 5:59 PM    Video End Time (time video stopped): 6:10 PM     Originating Location (pt. Location): Home    Distant Location (provider location):  On-site    Mode of Communication:  Video Conference via [facility]    Transplant Infectious Diseases Outpatient Progress Note     Patient:  Berta Nava, Date of birth 1965, Medical record number [id number]  Date of Visit:  12/13/2022           Recommendations:   1. Continue to hold maribavir.   2. If next CMV PCR is either undetected or <137, we can then start checking CMV PCR every other week for one month.   3. The patient will call her dermatologist for an appointment.     RTC: depends on rash and CMV values.          Summary of Presentation:   Transplants:  11/18/2021 (Liver), Postoperative day:  390     This patient is a 56 year old female with PBC-liver cirrhosis s/p OLT in 11/2021. Was on TAC/prednisone.   ?rejection 5/2022, MMF was added to TAC/prednisone.    IS deescalated to TAC/prednisone in 6/2022 due to CMV infection then TAC due to recurrent resistant CMV viremia.   History of UC with screening colonoscopy showing an evidence of CMV colitis. CMV PCR checked and was at 4.8 log 6/2022.         Active Problems and Infectious Diseases Issues:   1. Recurrent CMV viremia.   Started as a primary, donor-derived CMV viremia and colitis following an escalation of IS due to mild rejection. MMF was later discontinued.  CMV-specific CD4 and CD8 were checked on 10/7/2022; the results showed significantly low CD4 and reasonable (on the low side) CD8. An attempt to stop maribavir failed. CMV viremia recurred very shortly after stopping maribavir and while on letermovir for secondary ppx. Maribavir was resumed until 2/22/23 after TAC targeted trough level was decreased.      If next CMV PCR still undetected, or <2.1 log, will move  to every other week for one month.     Of note, colonoscopy was repeated 10/31/2022 with resolution of the ileal ulcer and no CMV on random biopsies.     2. Maculopapular, pruritic rash on arms, legs and papular rash on face.   I doubt infectious process while on lower IS therapy.   The rash may have preceded the shingrix and pneumovax but patient is not sure. However, allergy to these vaccines is rare and rash is not resolving a month later.   I doubt reaction to maribavir as rash is not improving 3 weeks after stopping maribavir.  The patient will follow with dermatology.         Old Problems and Infectious Diseases Issues:   1. SBP and BSI with S mitis 4/29/2021; on IV ABx since 4/29/2021 for possible association with liver abscess.    2. Indeterminate QuantiFeron due to low reaction to mitogen likely due to the patient's underlying liver disease. This patient is low risk for TB, CXR unremarkable; no further evaluations were deemed necessary.     Other Infectious Disease issues include:  - QTc: 465 as of 11/2021.   - PCP prophylaxis: bactrim until 4/24/2022 (5-6 months).   - Serostatus: CMV D+/R-, EBV D+/R+, HSV1?/2?, VZV ?. Currently on maribavir.    - Immunization status: This patient received the fifth dose (second booster, bivalent) of the COVID-19 vaccine on 10/12/2022. Otherwise she needs the second dose of the shingrix vaccine series and the 23-valent pneumonia vaccine. It is better to proceed with these vaccines while on maribavir.   - Gamma globulin status: >1000 as of 10/12/2022.       Attestation:  Total duration of visit including chart review, reviewing labs and imaging, interviewing and examining the patient, documentation, and sending communication to the patient and to the primary treating team, all at the same day of this encounter, is: 40 minutes.   Forest Dasilva MD    Contact information available via [facility] Paging/Directory     12/13/2022           Interim History:   Still with pruritic rash  on arms and legs, and non-pruritic rash on face. No fever. No enlarged LN.   No other complaints.   The dysgeusia has improved after stopping maribavir but not yet resolved.         History of the Infectious Disease lllness:       Transplants:  11/18/2021 (Liver); Postoperative day:  390    57 yo female with PBC-liver cirrhosis s/p OLT in 11/2021. The patient had mild ACR on a liver biopsy 5/2022, MMF was readminister and TAC/prednisone were maintained.   Also history of UC for which she undergoes screening colonoscopy.   During the last screening colonoscopy on 6/8/2022, she was found to have a single, 2mm cecal ulcer. The biopsy was c/w CMV infection.   MMF was discontinued.   The patient had no fever, diarrhea, N/V or abdominal pain. She was started on PO VGCV, later CMV VL was 4.8 log. Due to lack of symptoms, PO VGCV was continued. Unfortunately, after initial response, VL persisted at low level and did not respond to high dose VGCV. 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 CMV VL recurred while on suppressive therapy with letermovir. Maribavir was resumed with adequate response. CMV-specific immunity was not adequate enough to stop maribavir, yet, an attempt to discontinue maribavir was still done and failed with rising CMV VL within one week of stopping maribavir. Maribavir was again resumed 12/2022.   The patient was asymptomatic all along.     Exposure History:  Lives with her  in a single family house in the [place] area.   Does some gardening but wear gloves.   Traveled to [facility] in the past.   The only international travel was to [place], to a resort only.   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   Has two grown up children, do not live with her.   Has a dog.          Review of Systems:   As mentioned in the interim history otherwise negative by reviewing constitutional symptoms, central and peripheral neurological systems, respiratory  system, cardiac system, GI system,  system, musculoskeletal, skin, allergy, and lymphatics.           Immunizations:     Immunization History   Administered Date(s) Administered     COVID-19 Vaccine 18+ (Moderna) 02/25/2021, 03/18/2021, 04/15/2021, 03/21/2022     COVID-19 Vaccine Bivalent Booster 12+ (Pfizer) 10/12/2022     Flu, Unspecified 10/18/2016, 11/01/2017     HepA-Adult 05/12/2021     HepB-Adult 05/11/2021     Influenza (IIV3) PF 11/10/2010     Influenza Vaccine 50-64 or 18-64 w/egg allergy (Flublok) 10/12/2022     Influenza Vaccine >6 months (Alfuria,Fluzone) 09/03/2015, 12/30/2019, 09/15/2020     Influenza Vaccine, 6+MO IM (QUADRIVALENT W/PRESERVATIVES) 10/17/2018     Pneumo Conj 13-V (2010&after) 06/12/2021     Td (Adult), Adsorbed 03/06/1995, 05/27/2005     Tdap (Adacel,Boostrix) 07/01/2014     Twinrix A/B 06/12/2021     Zoster vaccine recombinant adjuvanted (SHINGRIX) 06/21/2021             Allergies:     Allergies   Allergen Reactions     Diagnostic X-Ray Materials Hives     PATIENT HAD HIVE REACTION AFTER ADMINISTERING CT CONTRAST DYE.       Contrast Dye              Medications:     Current Outpatient Medications   Medication Sig     amLODIPine (NORVASC) 5 MG tablet TAKE 2 TABLETS(10 MG) BY MOUTH DAILY     maribavir (LIVTENCITY) 200 MG tablet Take 2 tablets (400 mg) by mouth 2 times daily     multivitamin (CENTRUM SILVER) tablet Take 1 tablet by mouth daily     tacrolimus (GENERIC EQUIVALENT) 1 MG capsule Take 2 capsules (2 mg) by mouth every 12 hours     No current facility-administered medications for this visit.            Physical Exam:   No vitals are available during this virtual visit.   Constitutional: awake, alert, cooperative, no apparent distress and appears at stated age, well nourished.   Skin and face: face with erythematous papular rash, leges and arms bilaterally with erythematous maculopapular rash.            Laboratory Data:     No results found for: ACD4    Inflammatory Markers     Recent Labs   Lab Test 11/17/21  1908 08/19/21  0018 06/08/21  1602 06/01/21  0915 05/25/21  1455 05/21/21  1500 05/18/21  1010   CRP 54.0* 40.0* 23.1* 16.1* 14.3* 16.1* 21.4*       Immune Globulin Studies      Recent Labs   Lab Test 10/12/22  0758   IGG 1,145       Metabolic Studies    Recent Labs   Lab Test 12/05/22  0910 11/09/22  0740 10/05/22  0749 09/07/22  0745 08/10/22  0740 08/01/22  1653 07/27/22  0756 12/06/21  1031 12/03/21  1145 11/23/21  0551 11/23/21  0538 11/19/21  0127 11/19/21  0126    142 142 140 142 142 139   < > 142   < >  --    < >  --    POTASSIUM 4.6 4.2 4.3 3.8 3.5 3.8 4.1   < > 4.4   < >  --    < >  --    CHLORIDE 107 112* 113* 112* 111* 111* 111*   < > 113*   < >  --    < >  --    CO2 23 24 21 22 23 22 21   < > 23   < >  --    < >  --    ANIONGAP 11 6 8 6 8 9 7   < > 6   < >  --    < >  --    BUN 18.2 18 22 21 24 31* 23   < > 24   < >  --    < >  --    CR 0.85 0.76 0.81 0.83 0.85 0.81 0.68   < > 0.65   < >  --    < >  --    GFRESTIMATED 78 90 83 81 79 84 >90   < > >90   < >  --    < >  --    GLC 91 92 100* 93 91 93 84   < > 97   < >  --    < >  --    A1C  --   --   --   --   --   --   --   --   --   --   --   --  5.4   MARIELENA 9.9 9.6 9.4 9.0 8.8 9.1 8.7   < > 8.4*   < >  --    < >  --    PHOS 4.0 4.3 4.4 4.0 3.3  --  3.3   < > 3.5   < > 1.9*   < >  --    MAG  --   --   --   --  1.8  --  2.0   < > 1.7   < > 2.2   < >  --    LACT  --   --   --   --   --   --   --   --  0.6*  --  0.8   < >  --     < > = values in this interval not displayed.       Hepatic Studies    Recent Labs   Lab Test 12/05/22 0910 11/09/22 0740 10/05/22  0749 09/07/22  0745 08/10/22  0740 08/01/22  1653   BILITOTAL 0.5 0.3 0.6 0.6 0.6 0.4   ALKPHOS 58 54 66 50 49 52   PROTTOTAL 7.4 7.5 7.7 7.1 6.9 7.4   ALBUMIN 4.3 3.8 3.7 3.6 3.6 3.9   AST 18 15 18 25 21 18   ALT 11 18 17 32 30 28       Hematology Studies     Recent Labs   Lab Test 12/05/22  0910 11/09/22 0740 10/12/22  0758 09/07/22 0745 08/10/22  0740  08/01/22  1653 07/27/22  0756 07/20/22  0805 07/13/22  0758 07/06/22  0801 06/21/21  0938 06/12/21  1259   WBC 6.1 6.0 4.4 4.5 2.2* 2.3* 2.0*   < > 2.3* 2.2*   < > 21.3*   ANEU  --   --   --   --  0.9* 1.4* 1.1*  --  1.4* 1.2*  --  18.5*   ALYM  --   --   --   --  1.2 0.9 0.9  --  0.9 0.9  --  1.1   BRIGIDO  --   --   --   --  0.1 0.0 0.0  --  0.0 0.1  --  1.5*   AEOS  --   --   --   --  0.0 0.0 0.0  --  0.0 0.0  --  0.0   HGB 11.7 11.5* 10.4* 11.7 12.7 12.0 12.4   < > 12.9 12.1   < > 10.3*   HCT 34.0* 33.4* 29.9* 33.6* 37.9 36.2 36.8   < > 38.9 37.9   < > 32.3*    231 181 156 171 156 175   < > 218 204   < > 291    < > = values in this interval not displayed.       Clotting Studies    Recent Labs   Lab Test 05/17/22  1011 12/13/21  1022 12/03/21  1145 11/22/21  0419 11/21/21  0318 11/20/21  0508   INR 1.09 1.12 1.13 1.22* 1.34*  --    PTT  --   --  36 29 31 33       Urine Studies    Recent Labs   Lab Test 07/27/22  0803 12/03/21  1342 11/24/21 2014 11/20/21  1030 11/06/21  0509 10/25/21  1339 09/01/21  1757   URINEPH 7.0 6.0 5.0 5.0 5.5 5.5 5.5   NITRITE Negative Negative Negative Negative Negative Negative Negative   LEUKEST Negative Negative Negative Negative Negative Negative Negative   WBCU  --  1  --  3 1 1 2         Microbiology:  Last 6 Culture results with specimen source  Culture Micro   Date Value Ref Range Status   06/10/2021 No growth  Final   06/10/2021 Canceled, Test credited  Final   06/10/2021 Duplicate request  Final   06/10/2021   Final    Notification of test cancellation was given to  Christine Torres RN from [facility]. 6/10/21 at 0812.TV.     06/10/2021 No growth  Final   06/09/2021 No growth  Final    Specimen Description   Date Value Ref Range Status   06/10/2021 Ascites Fluid  Final   06/10/2021 Ascites Fluid  Final   06/10/2021 Midstream Urine  Final   06/10/2021 Midstream Urine  Final   06/09/2021 Blood Left Arm  Final   06/09/2021 Blood PICC  Final      No results found for: CMV    Last check  of C difficile  C Diff Toxin B PCR   Date Value Ref Range Status   05/16/2018 Negative NEG^Negative Final     Comment:     Negative: Clostridium difficile target DNA sequences NOT detected, presumed   negative for Clostridium difficile toxin B or the number of bacteria present   may be below the limit of detection for the test.  FDA approved assay performed using [employer] Gene[employer] real-time PCR.  A negative result does not exclude actual disease due to Clostridium difficile   and may be due to improper collection, handling and storage of the specimen   or the number of organisms in the specimen is below the detection limit of the   assay.       C Difficile Toxin B by PCR   Date Value Ref Range Status   12/03/2021 Negative Negative Final     Comment:     A negative result does not exclude actual disease due to C. difficile and may be due to improper collection, handling and storage of the specimen or the number of organisms in the specimen is below the detection limit of the assay.         Virology:  CMV viral loads    CMV viral loads    CMV DNA IU/mL   Date Value Ref Range Status   12/05/2022 <137 (A) Not Detected IU/mL Final     Comment:     CMV DNA detected, less than 137 IU/mL     CMV DNA IU/mL, Instrument   Date Value Ref Range Status   09/14/2022 1,177 (H) <1 IU/mL Final   08/24/2022 181 (H) <1 IU/mL Final   08/17/2022 244 (H) <1 IU/mL Final   08/10/2022 467 (H) <1 IU/mL Final   08/03/2022 211 (H) <1 IU/mL Final   07/27/2022 206 (H) <1 IU/mL Final   07/20/2022 259 (H) <1 IU/mL Final   07/13/2022 932 (H) <1 IU/mL Final   07/06/2022 3,537 (H) <1 IU/mL Final     CMV viral loads    Recent Labs   Lab Test 12/05/22  0910 09/21/22  0758 09/14/22  0747 08/31/22  0748 08/24/22  0740 08/17/22  0750 08/10/22  0740 08/03/22  0753 07/27/22  0756 07/20/22  0805 07/13/22  0758 07/06/22  0801   CMVQNT <137*   < >  --    < >  --   --   --   --   --   --   --   --    CMVRESINST  --   --  1,177*  --  181* 244* 467* 211* 206* 259*  932* 3,537*   CMVLOG <2.1   < > 3.1   < > 2.3 2.4 2.7 2.3 2.3 2.4 3.0 3.5    < > = values in this interval not displayed.       CMV viral loads    CMV DNA IU/mL   Date Value Ref Range Status   12/05/2022 <137 (A) Not Detected IU/mL Final     Comment:     CMV DNA detected, less than 137 IU/mL   11/23/2022 <137 (A) Not Detected IU/mL Final     Comment:     CMV DNA detected, less than 137 IU/mL   11/09/2022 Not Detected Not Detected IU/mL Final   10/26/2022 Not Detected Not Detected IU/mL Final   10/12/2022 <137 (A) Not Detected IU/mL Final     Comment:     CMV DNA detected, less than 137 IU/mL   10/05/2022 <137 (A) Not Detected IU/mL Final     Comment:     CMV DNA detected, less than 137 IU/mL   09/28/2022 <137 (A) Not Detected IU/mL Final     Comment:     CMV DNA detected, less than 137 IU/mL   09/21/2022 <137 (A) Not Detected IU/mL Final     Comment:     CMV DNA detected, less than 137 IU/mL   09/07/2022 <137 (A) Not Detected IU/mL Final     Comment:     CMV DNA detected, less than 137 IU/mL     CMV DNA IU/mL, Instrument   Date Value Ref Range Status   09/14/2022 1,177 (H) <1 IU/mL Final   08/24/2022 181 (H) <1 IU/mL Final   08/17/2022 244 (H) <1 IU/mL Final   08/10/2022 467 (H) <1 IU/mL Final   08/03/2022 211 (H) <1 IU/mL Final   07/27/2022 206 (H) <1 IU/mL Final   07/20/2022 259 (H) <1 IU/mL Final   07/13/2022 932 (H) <1 IU/mL Final   07/06/2022 3,537 (H) <1 IU/mL Final     CMV log   Date Value Ref Range Status   12/05/2022 <2.1  Final   11/23/2022 <2.1  Final   10/12/2022 <2.1  Final   10/05/2022 <2.1  Final   09/28/2022 <2.1  Final   09/21/2022 <2.1  Final   09/14/2022 3.1  Final   09/07/2022 <2.1  Final   08/31/2022 <2.1  Final   08/24/2022 2.3  Final   08/17/2022 2.4  Final   08/10/2022 2.7  Final   08/03/2022 2.3  Final   07/27/2022 2.3  Final   07/20/2022 2.4  Final   07/13/2022 3.0  Final   07/06/2022 3.5  Final   06/29/2022 3.9  Final   06/22/2022 4.4  Final   06/14/2022 4.8  Final       CMV resistance  testing  Recent Labs   Lab Test 07/27/22  0759   CMVCID Sensitive   CMVFOS Sensitive   CMVGAN Resistant     CMV Cidofovir Resist   Date Value Ref Range Status   07/27/2022 Sensitive  Final     CMV Foscarnet Resist   Date Value Ref Range Status   07/27/2022 Sensitive  Final     CMV Ganciclovir Resis   Date Value Ref Range Status   07/27/2022 Resistant  Final     Comment:       The following UL54 mutations were detected: None  The following UL97 mutations were detected: [id number]  INTERPRETIVE INFORMATION: CMV Antiviral Resistance    Codons 457-630 of the UL97 gene and codons 393-1000 of the   UL54 gene are sequenced. Mutations associated with   resistance to ganciclovir, cidofovir, and foscarnet are   reported. Mutations in viral sub-populations below 20   percent of total may not be detected.    This test was developed and its performance characteristics   determined by [facility]. It has not been cleared or   approved by the US Food and Drug Administration. This test   was performed in a CLIA certified laboratory and is   intended for clinical purposes.  Performed By: [facility]  [address] [place] [id number]  : Ariel Frank MD, PhD        EBV viral loads   No lab results found.  No results found for: EBVDN, EBRES, EBVDN, EBVSP, EBVPC, EBVPCR    Human Herpes Virus 6 viral loads    No results found for: H6RES No results found for: H6SPEC    CMV Antibody IgG   Date Value Ref Range Status   11/17/2021 No detectable antibody. No detectable antibody.  Final   08/18/2021 No detectable antibody. No detectable antibody.  Final     CMV Antibody IgM   Date Value Ref Range Status   11/17/2021 Negative Negative Final   08/18/2021 Negative Negative Final     No results found for: EBIG2, EBIGM, EBVIGG, EBIGG, EBVAGN, WO6344, TOXG      Pathology:  Colon Bx 10/31/2022  Addendum   CMV immunohistochemical stains performed on parts C. - F.  are negative.  Analyte Specific Reagents  (ASRs) are used in many laboratory tests necessary for standard medical care and generally do not require FDA approval. This test was developed and its performance characteristics determined by [facility]. It has not been cleared or approved by the U.S. Food and Drug Administration.  [facility] are certified for the performance of high-complexity clinical testing under the Clinical Laboratory Improvement Amendments of 1988 (CLIA), and in keeping with the certification requirements, the laboratory has verified this test's accuracy, precision and/or validity of the method.      Addendum electronically signed by Jagdish Mccormick DO on 11/2/2022 at  2:45 PM   Final Diagnosis   A.  COLON, ASCENDING, POLYP:  - Colonic mucosa with benign lymphoid aggregates  - No dysplasia or malignancy identified    B.  CECUM, POLYP:  - Colonic mucosa with benign lymphoid aggregates  - No dysplasia or malignancy identified    C.  COLON, CECUM/ASCENDING ASCENDING, BIOPSY:  - Unremarkable colonic mucosa  - No dysplasia or malignancy identified    D.  COLON, TRANSVERSE, BIOPSY:  - Unremarkable colonic mucosa  - No dysplasia or malignancy identified    E.  COLON, DESCENDING/SIGMOID, BIOPSY:  - Unremarkable colonic mucosa  - No dysplasia or malignancy identified    F.  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  CECAL ULCER, BIOPSY :  - Colonic mucosa with ulceration and necroinflammatory debris  - POSITIVE for CMV by immunohistochemistry  - Negative for HSV by immunohistochemistry    B.  CECUM AND ASCENDING COLON, BIOPSIES :  - Colonic mucosa with mild crypt distortion and mild chronic inflammation (Re grade 1)  - Negative for dysplasia      C. HEPATIC FLEXURE AND TRANSVERSE COLON, BIOPSIES :  - Colonic mucosa with mild crypt distortion no significant  inflammation (Re grade 0)  - Negative for dysplasia      D. DISTAL AND TRANSVERSE COLON, BIOPSIES :  - Colonic mucosa with no significant inflammation (Re grade 0)  - Negative for dysplasia     E. DESCENDING COLON, BIOPSIES :  - Colonic mucosa with mild crypt distortion and mild patchy chronic inflammation (Re grade 1)  - Negative for dysplasia      F. RECTAL BIOPSIES :  - Colonic mucosa with no significant inflammation (Re grade 0)  - Negative for dysplasia      Liver biopsy 5/17/2022  Final Diagnosis   A.  LIVER, ALLOGRAFT, NEEDLE CORE BIOPSY:  - Mild focal portal inflammation indeterminate for acute cellular rejection (CHIU: 2-3/9)

## 2023-03-15 ENCOUNTER — TELEPHONE (OUTPATIENT)
Dept: GASTROENTEROLOGY | Facility: CLINIC | Age: 58
End: 2023-03-15

## 2023-03-15 ENCOUNTER — OFFICE VISIT (OUTPATIENT)
Dept: INTERNAL MEDICINE | Facility: CLINIC | Age: 58
End: 2023-03-15
Payer: COMMERCIAL

## 2023-03-15 VITALS
OXYGEN SATURATION: 98 % | TEMPERATURE: 97.8 F | SYSTOLIC BLOOD PRESSURE: 148 MMHG | WEIGHT: 168 LBS | HEIGHT: 65 IN | HEART RATE: 92 BPM | RESPIRATION RATE: 18 BRPM | DIASTOLIC BLOOD PRESSURE: 92 MMHG | BODY MASS INDEX: 27.99 KG/M2

## 2023-03-15 DIAGNOSIS — D84.9 IMMUNOSUPPRESSED STATUS (H): ICD-10-CM

## 2023-03-15 DIAGNOSIS — Z94.4 S/P LIVER TRANSPLANT (H): ICD-10-CM

## 2023-03-15 DIAGNOSIS — R21 RASH AND NONSPECIFIC SKIN ERUPTION: ICD-10-CM

## 2023-03-15 DIAGNOSIS — Z13.820 ENCOUNTER FOR OSTEOPOROSIS SCREENING IN ASYMPTOMATIC POSTMENOPAUSAL PATIENT: ICD-10-CM

## 2023-03-15 DIAGNOSIS — K83.01 PRIMARY SCLEROSING CHOLANGITIS (H): ICD-10-CM

## 2023-03-15 DIAGNOSIS — I10 ESSENTIAL HYPERTENSION: ICD-10-CM

## 2023-03-15 DIAGNOSIS — R73.09 ELEVATED GLUCOSE: ICD-10-CM

## 2023-03-15 DIAGNOSIS — Z78.0 ENCOUNTER FOR OSTEOPOROSIS SCREENING IN ASYMPTOMATIC POSTMENOPAUSAL PATIENT: ICD-10-CM

## 2023-03-15 DIAGNOSIS — E78.5 HYPERLIPIDEMIA, UNSPECIFIED HYPERLIPIDEMIA TYPE: ICD-10-CM

## 2023-03-15 DIAGNOSIS — Z00.00 ENCOUNTER FOR PREVENTIVE HEALTH EXAMINATION: Primary | ICD-10-CM

## 2023-03-15 PROCEDURE — 99396 PREV VISIT EST AGE 40-64: CPT

## 2023-03-15 PROCEDURE — 99214 OFFICE O/P EST MOD 30 MIN: CPT | Mod: 25

## 2023-03-15 RX ORDER — LISINOPRIL 10 MG/1
10 TABLET ORAL DAILY
Qty: 90 TABLET | Refills: 0 | Status: SHIPPED | OUTPATIENT
Start: 2023-03-15 | End: 2023-05-22

## 2023-03-15 ASSESSMENT — ENCOUNTER SYMPTOMS
DYSURIA: 0
HEMATURIA: 0
ARTHRALGIAS: 1
DIZZINESS: 0
MYALGIAS: 1
HEMATOCHEZIA: 0
HEADACHES: 0
SORE THROAT: 0
JOINT SWELLING: 0
EYE PAIN: 0
CONSTIPATION: 0
ABDOMINAL PAIN: 0
COUGH: 0
PARESTHESIAS: 0
CHILLS: 0
NERVOUS/ANXIOUS: 0
FREQUENCY: 0
WEAKNESS: 0
SHORTNESS OF BREATH: 0
NAUSEA: 0
FEVER: 0
HEARTBURN: 0
BREAST MASS: 0
PALPITATIONS: 0
DIARRHEA: 0

## 2023-03-15 NOTE — PATIENT INSTRUCTIONS
-Lab only appointment in 4-6 weeks.    -Virtual phone appt with me in 6 weeks. Take blood pressure readings at home 3x/week.    -I sent prescription for Lisinopril. Okay to discontinue Amlodipine.    -Call to schedule appt with Dermatology.    Please let me know if you have any questions.    Thanks!  CHELSEY Cloud, CNP   [facility]

## 2023-03-15 NOTE — PROGRESS NOTES
SUBJECTIVE:   CC: Berta is an 57 year old who presents for preventive health visit.     Patient has been advised of split billing requirements and indicates understanding: Yes  Healthy Habits:     Getting at least 3 servings of Calcium per day:  Yes    Bi-annual eye exam:  NO    Dental care twice a year:  Yes    Sleep apnea or symptoms of sleep apnea:  None    Diet:  Regular (no restrictions)    Frequency of exercise:  1 day/week    Duration of exercise:  15-30 minutes    Taking medications regularly:  Yes    Medication side effects:  None    PHQ-2 Total Score: 0    Ability to successfully perform activities of daily living: Yes, no assistance needed  Home safety:  none identified   Hearing impairment: No          Today's PHQ-2 Score:   PHQ-2 ( 1999 [employer]) 3/10/2023   Q1: Little interest or pleasure in doing things 0   Q2: Feeling down, depressed or hopeless 0   PHQ-2 Score 0   PHQ-2 Total Score (12-17 Years)- Positive if 3 or more points; Administer PHQ-A if positive -   Q1: Little interest or pleasure in doing things Not at all   Q2: Feeling down, depressed or hopeless Not at all   PHQ-2 Score 0           Social History     Tobacco Use     Smoking status: Never     Smokeless tobacco: Never   Substance Use Topics     Alcohol use: No     Comment: Last drink was 2017         Alcohol Use 3/10/2023   Prescreen: >3 drinks/day or >7 drinks/week? Not Applicable       Reviewed orders with patient.  Reviewed health maintenance and updated orders accordingly - Yes  Lab work is in process    Breast Cancer Screening:    FHS-7:   Breast CA Risk Assessment (FHS-7) 5/16/2022   Did any of your first-degree relatives have breast or ovarian cancer? No   Did any of your relatives have bilateral breast cancer? No   Did any man in your family have breast cancer? No   Did any woman in your family have breast and ovarian cancer? No   Did any woman in your family have breast cancer before age 50 y? No   Do you have 2 or more  "relatives with breast and/or ovarian cancer? No   Do you have 2 or more relatives with breast and/or bowel cancer? No         Pertinent mammograms are reviewed under the imaging tab.    History of abnormal Pap smear: NO - age 30-65 PAP every 5 years with negative HPV co-testing recommended     Reviewed and updated as needed this visit by clinical staff   Tobacco  Allergies  Meds              Reviewed and updated as needed this visit by Provider                   Review of Systems   Constitutional: Negative for chills and fever.   HENT: Positive for hearing loss. Negative for congestion, ear pain and sore throat.    Eyes: Negative for pain and visual disturbance.   Respiratory: Negative for cough and shortness of breath.    Cardiovascular: Negative for chest pain, palpitations and peripheral edema.   Gastrointestinal: Negative for abdominal pain, constipation, diarrhea, heartburn, hematochezia and nausea.   Breasts:  Negative for tenderness, breast mass and discharge.   Genitourinary: Negative for dysuria, frequency, genital sores, hematuria, pelvic pain, urgency, vaginal bleeding and vaginal discharge.   Musculoskeletal: Positive for arthralgias and myalgias. Negative for joint swelling.   Skin: Positive for rash.   Neurological: Negative for dizziness, weakness, headaches and paresthesias.   Psychiatric/Behavioral: Negative for mood changes. The patient is not nervous/anxious.         OBJECTIVE:   BP (!) 148/92   Pulse 92   Temp 97.8  F (36.6  C)   Resp 18   Ht 1.651 m (5' 5\")   Wt 76.2 kg (168 lb)   LMP  (LMP Unknown)   SpO2 98%   Breastfeeding No   BMI 27.96 kg/m      Physical Exam  Constitutional:       General: She is not in acute distress.     Appearance: Normal appearance. She is not ill-appearing, toxic-appearing or diaphoretic.   HENT:      Head: Normocephalic and atraumatic.   Eyes:      Conjunctiva/sclera: Conjunctivae normal.   Cardiovascular:      Rate and Rhythm: Normal rate and regular " rhythm.      Heart sounds: Normal heart sounds.   Pulmonary:      Effort: Pulmonary effort is normal.      Breath sounds: Normal breath sounds.   Skin:     General: Skin is warm and dry.   Neurological:      Mental Status: She is alert and oriented to person, place, and time.   Psychiatric:         Mood and Affect: Mood normal.         Behavior: Behavior normal.         Thought Content: Thought content normal.         Judgment: Judgment normal.       Diagnostic Test Results:  Labs reviewed in Epic    ASSESSMENT/PLAN:   (Z00.00) Encounter for preventive health examination  (primary encounter diagnosis)  Comment: Presents for annual physical.       (Z94.4) S/P liver transplant (H)  Comment: S/P liver transplant (November of 2021). Follows with liver transplant physician closely. Also following with ID closely for CMV.        (K83.01) Primary sclerosing cholangitis  Comment: Hx of ulcerative colitis. S/P liver transplant in November of 2021. She is doing very well. Only on Tacrolimus 1MG BID.       (I10) Essential hypertension  Comment: BP today is 148/92. Has been on Amlodipine 10MG for past year or so. She notes being on Lisinopril in the past and tolerating this well, she would like to re-start this medication and discontinue Amlodipine. I am in agreement with this plan.  Plan: Basic metabolic panel  (Ca, Cl, CO2, Creat,         Gluc, K, Na, BUN), lisinopril (ZESTRIL) 10 MG         tablet            (D84.9) Immunosuppressed status (H)  Comment: On very small dose of immunosuppressant currently.    (E78.5) Hyperlipidemia, unspecified hyperlipidemia type  Comment: LDL on 3/7/23 was 155. She does note strong family hx of premature CAD. Her dad had MI at age 46. She would like to work on diet and exercise for now and repeat lipids in 6 months. ASCVD today is 5.3%. If we account for family hx and multiply this by 1.5%, it would be ~7.9%.       (R73.09) Elevated glucose  Plan: Hemoglobin A1c            (Z13.820,  Z78.0)  "Encounter for osteoporosis screening in asymptomatic postmenopausal patient  Plan: DX Hip/Pelvis/Spine            (R21) Rash and nonspecific skin eruption  Plan: Adult Dermatology Referral              The 10-year ASCVD risk score (Brandee RAMIREZ, et al., 2019) is: 5.3%    Values used to calculate the score:      Age: 57 years      Sex: Female      Is Non- : No      Diabetic: No      Tobacco smoker: No      Systolic Blood Pressure: 148 mmHg      Is BP treated: Yes      HDL Cholesterol: 70 mg/dL      Total Cholesterol: 244 mg/dL      Patient has been advised of split billing requirements and indicates understanding: Yes      COUNSELING:  Reviewed preventive health counseling, as reflected in patient instructions       Regular exercise       Healthy diet/nutrition       Osteoporosis prevention/bone health       Colorectal Cancer Screening      BMI:   Estimated body mass index is 27.96 kg/m  as calculated from the following:    Height as of this encounter: 1.651 m (5' 5\").    Weight as of this encounter: 76.2 kg (168 lb).   Weight management plan: Discussed healthy diet and exercise guidelines      She reports that she has never smoked. She has never used smokeless tobacco.          CHELSEY [facility]  "

## 2023-03-15 NOTE — TELEPHONE ENCOUNTER
Screening Questions  BLUE  KIND OF PREP RED  LOCATION [review exclusion criteria] GREEN  SEDATION TYPE        y Are you active on mychart?       Ty Ordering/Referring Provider?        [facility] What type of coverage do you have?      n Have you had a positive covid test in the last 14 days?     25.8 1. BMI  [BMI 40+ - review exclusion criteria]    y  2. Are you able to give consent for your medical care? [IF NO,RN REVIEW]          n  3. Are you taking any prescription pain medications on a routine schedule   (ex narcotics: oxycodone, roxicodone, oxycontin,  and percocet)? [RN Review]        n  3a. EXTENDED PREP What kind of prescription?     n 4. Do you have any chemical dependencies such as alcohol, street drugs, or methadone?        **If yes 3- 5 , please schedule with MAC sedation.**          IF YES TO ANY 6 - 10 - HOSPITAL SETTING ONLY.     n 6.   Do you need assistance transferring?     n 7.   Have you had a heart or lung transplant?    n 8.   Are you currently on dialysis?   n 9.   Do you use daily home oxygen?   n 10. Do you take nitroglycerin?   10a. n If yes, how often?     11. [FEMALES]  n Are you currently pregnant?    11a. n If yes, how many weeks? [ Greater than 12 weeks, OR NEEDED]    n 12. Do you have Pulmonary Hypertension? *NEED PAC APPT AT [facility] w/ MAC*     n 13. [review exclusion criteria]  Do you have any implantable devices in your body (pacemaker, defib, LVAD)?    n 14. In the past 6 months, have you had any heart related issues including cardiomyopathy or heart attack?     14a. n If yes, did it require cardiac stenting if so when?     n 15. Have you had a stroke or Transient ischemic attack (TIA - aka  mini stroke ) within 6 months?      n 16. Do you have mod to severe Obstructive Sleep Apnea?  [Hospital only]    n 17. Do you have SEVERE AND UNCONTROLLED asthma? *NEED PAC APPT AT [facility] w/MAC*     18. Are you currently taking any blood thinners?     18a. No. Continue to 19.   18b.     n 19. Do  "you take the medication Phentermine?    19a. If yes, \"Hold for 7 days before procedure.  Please consult your prescribing provider if you have questions about holding this medication.\"     n  20. Do you have chronic kidney disease?      n  21. Do you have a diagnosis of diabetes?     n  22. On a regular basis do you go 3-5 days between bowel movements?      23. Preferred LOCAL Pharmacy for Pre Prescription    [ LIST ONLY ONE PHARMACY]          [id number] - [place], [place] - [address] [place] [address] [address] AT [facility] [address]          - CLOSING REMINDERS -    Informed patient they will need an adult    Cannot take any type of public or medical transportation alone    Conscious Sedation- Needs  for 6 hours after the procedure       MAC/General-Needs  for 24 hours after procedure    Pre-Procedure Covid test to be completed [[facility] PCR Testing Required]    Confirmed Nurse will call to complete assessment       - SCHEDULING DETAILS -  n Hospital Setting Required? If yes, what is the exclusion?:    Ty  Surgeon    10/31/23  Date of Procedure  Lower Endoscopy [Colonoscopy]  Type of Procedure Scheduled  [facility] [place]-If you answer yes to questions #8, #20, #21Which Colonoscopy Prep was Sent?     CS Sedation Type     no PAC / Pre-op Required                 "

## 2023-03-16 ENCOUNTER — TELEPHONE (OUTPATIENT)
Dept: DERMATOLOGY | Facility: CLINIC | Age: 58
End: 2023-03-16
Payer: COMMERCIAL

## 2023-03-16 NOTE — TELEPHONE ENCOUNTER
[facility] Call Center    Phone Message    May a detailed message be left on voicemail: yes     Reason for Call: Symptoms or Concerns     If patient has red-flag symptoms, warm transfer to triage line    Current symptom or concern: itchy rash all over body    Symptoms have been present for:  6 week(s)    Has patient previously been seen for this? Yes    By :    Forest Dasilva MD         Date:03/14/2023/ Video    Are there any new or worsening symptoms? Yes: recommended to see a dermatologist ASAP. Pt. Is a transplant pt, Please call pt to discuss        Action Taken: Message routed to:  [facility] Derm    Travel Screening: Not Applicable

## 2023-03-21 ENCOUNTER — OFFICE VISIT (OUTPATIENT)
Dept: DERMATOLOGY | Facility: CLINIC | Age: 58
End: 2023-03-21
Payer: COMMERCIAL

## 2023-03-21 DIAGNOSIS — L20.89 OTHER ATOPIC DERMATITIS: ICD-10-CM

## 2023-03-21 DIAGNOSIS — R21 RASH: Primary | ICD-10-CM

## 2023-03-21 PROCEDURE — 88313 SPECIAL STAINS GROUP 2: CPT | Mod: 26 | Performed by: DERMATOLOGY

## 2023-03-21 PROCEDURE — 88342 IMHCHEM/IMCYTCHM 1ST ANTB: CPT | Mod: TC | Performed by: STUDENT IN AN ORGANIZED HEALTH CARE EDUCATION/TRAINING PROGRAM

## 2023-03-21 PROCEDURE — 88305 TISSUE EXAM BY PATHOLOGIST: CPT | Mod: 26 | Performed by: DERMATOLOGY

## 2023-03-21 PROCEDURE — 11104 PUNCH BX SKIN SINGLE LESION: CPT | Performed by: STUDENT IN AN ORGANIZED HEALTH CARE EDUCATION/TRAINING PROGRAM

## 2023-03-21 PROCEDURE — 99214 OFFICE O/P EST MOD 30 MIN: CPT | Mod: 25 | Performed by: STUDENT IN AN ORGANIZED HEALTH CARE EDUCATION/TRAINING PROGRAM

## 2023-03-21 PROCEDURE — 88342 IMHCHEM/IMCYTCHM 1ST ANTB: CPT | Mod: 26 | Performed by: DERMATOLOGY

## 2023-03-21 RX ORDER — TACROLIMUS 1 MG/G
OINTMENT TOPICAL 2 TIMES DAILY
Qty: 60 G | Refills: 1 | Status: SHIPPED | OUTPATIENT
Start: 2023-03-21 | End: 2024-05-21

## 2023-03-21 NOTE — PROGRESS NOTES
[facility] Dermatology Note    Encounter Date: Mar 21, 2023    Dermatology Problem List:  1. Liver transplant 11/18/2021  - Tacrolimus, prednisone, mycophenolate  2. Pilar cysts  - Pending excision  3. 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    Diagnoses and all orders for this visit:    Rash  -     [place] PUNCH BIOPSY OF SKIN, FIRST/SINGLE LESION  -     Dermatological Path Order and Indications; Standing  --Rash comprised of small red macules on bilateral shins without a background of hemosiderin consistent with pigmented purpuric dermatosis-patient does have varicose veins  - Due to patient's history of transplant and resolving/resolved CMV viremia and patient is concerned about this being related to CMV will biopsy to rule out CMV related vasculitis although my suspicion for this is low  - Patient also has a fading pink patch on the right buttock slightly annular without overlying scale patient states that it has improved from what it used to be, it is possible that that this is also PPD, since it is improving we will monitor at the 1 month follow-up                Other atopic dermatitis  -     tacrolimus (PROTOPIC) 0.1 % external ointment; Apply topically 2 times daily  - Facial itching and scaling without significant acanthosis different morphology than what is on the legs  - Treat empirically with Protopic twice daily        Follow-up in 1 mo.       Staff Involved:  Staff Only    Huseyin Cole MD   of Dermatology  Department of Dermatology  [facility]      CC:   Chief Complaint   Patient presents with     Derm Problem     Patient has a rash all over that she believes started around February 15th and experience  tingling, and pruritus all across her body. Patient concerned it could be related to her vaccines she received.        History of Present Illness:  Ms. Berta Nava is a 57 year old female who presents as a return patient.    Rash started in February. Had shingrix and pneumovax vaccines 02/15/22 thinks she had symptoms prior to that.     Currently on tacrolimus 1mg BID. Has been off mycophenolate quite a while ago.     Stopped taking maribavir 02/22/23.     Main area of symptoms is on face and eyelids and cheeks. Thought it could be contact allergy to comsetics, she stopped most of her make up and face creams     Labs:      Physical exam:  Vitals: LMP  (LMP Unknown)   GEN: well developed, well-nourished, in no acute distress, in a pleasant mood.     SKIN: Figueredo phototype 1  - Full skin, which includes the head/face, both arms, chest, back, abdomen,both legs, genitalia and/or groin buttocks, digits and/or nails, was examined.  -Scattered pink to red macules on bilateral shins  - Annular pink to red patch right buttock, fading  - No other lesions of concern on areas examined.     Past Medical History:   Past Medical History:   Diagnosis Date     Ascites      Biliary cirrhosis (H)      Cholangitis, sclerosing      Cirrhosis of liver with ascites (H) 03/03/2021     Hearing loss of left ear     wears a hearing aide     History of blood transfusion      History of low potassium      Hyperlipidemia      Hypertension      Liver transplant rejection (H) 05/17/2022    Mild Rejection     SBP (spontaneous bacterial peritonitis) (H) 04/30/2021     Sjogren's syndrome (H)      Ulcerative colitis (H)      Ulcerative pancolitis (H)      Past Surgical History:   Procedure Laterality Date     BENCH LIVER N/A 11/18/2021    Procedure: Bench liver;  Surgeon: Ernesto Schmitt MD;  Location: [facility] OR     COLONOSCOPY N/A 06/08/2022    Procedure: COLONOSCOPY, WITH BIOPSY;  Surgeon: Leventhal, Thomas Michael, MD;  [facility] [facility] [place]      COLONOSCOPY N/A 10/31/2022    Procedure: COLONOSCOPY, WITH POLYPECTOMY AND BIOPSY;  Surgeon: Kendrick Crawford MD;  Location: [facility] [place] [facility]     COMBINED ESOPHAGOSCOPY, GASTROSCOPY, DUODENOSCOPY (EGD), REMOVE BILIARY STENT N/A 02/09/2022    Procedure: ESOPHAGOGASTRODUODENOSCOPY, WITH BILIARY STENT REMOVAL;  Surgeon: Leventhal, Thomas Michael, MD;  Location: [facility] [place] OR     CV CORONARY ANGIOGRAM N/A 08/25/2021    Procedure: Coronary Angiogram with possible intervention;  Surgeon: David Wilhelm MD;  Location:  HEART CARDIAC CATH LAB     ENDOSCOPIC RETROGRADE CHOLANGIOPANCREATOGRAM N/A 06/25/2021    Procedure: ENDOSCOPIC RETROGRADE CHOLANGIOPANCREATOGRAPHY with ballon sweep of bile ducts for stones, balloon dilation of bile ducts and bile duct stent placement;  Surgeon: Gregory Gabriel MD;  Location:  OR     ENDOSCOPIC RETROGRADE CHOLANGIOPANCREATOGRAM N/A 07/22/2021    Procedure: ENDOSCOPIC RETROGRADE CHOLANGIOPANCREATOGRAPHY STONE REMOVAL, DILATION AND STENT PLACEMENT;  Surgeon: Gregory Gabriel MD;  Location:  OR     ENDOSCOPIC RETROGRADE CHOLANGIOPANCREATOGRAPHY, EXCHANGE TUBE/STENT N/A 05/05/2021    Procedure: ENDOSCOPIC RETROGRADE CHOLANGIOPANCREATOGRAPHY WITH STENT EXCHANGE, STONE EXTRACTION, AND DILATION;  Surgeon: Gregory Gabriel MD;  Location: [facility] OR     ENDOSCOPIC RETROGRADE CHOLANGIOPANCREATOGRAPHY, EXCHANGE TUBE/STENT N/A 05/10/2021    Procedure: ENDOSCOPIC RETROGRADE CHOLANGIOPANCREATOGRAPHY with biliary dilation, stone removal, stent exchange;  Surgeon: Gregory Gabriel MD;  Location: [facility] OR     ENDOSCOPIC RETROGRADE CHOLANGIOPANCREATOGRAPHY, EXCHANGE TUBE/STENT N/A 06/10/2021    Procedure: ENDOSCOPIC RETROGRADE CHOLANGIOPANCREATOGRAPHY, WITH biliary stent exchange, stone removal;  Surgeon: Woodrow Lott MD;  Location: [facility] OR     ENDOSCOPIC RETROGRADE CHOLANGIOPANCREATOGRAPHY, EXCHANGE TUBE/STENT N/A 08/30/2021    Procedure: ENDOSCOPIC RETROGRADE CHOLANGIOPANCREATOGRAPHY with biliary  dilation, stone removal, stent exchange;  Surgeon: Gregory Gabriel MD;  Location: [facility] OR     ENDOSCOPIC RETROGRADE CHOLANGIOPANCREATOGRAPHY, EXCHANGE TUBE/STENT N/A 10/01/2021    Procedure: ENDOSCOPIC RETROGRADE CHOLANGIOPANCREATOGRAPHY with balloon sweep of bile ducts, bile duct stent exchanged;  Surgeon: Gregory Gabriel MD;  Location:  OR     ESOPHAGOSCOPY, GASTROSCOPY, DUODENOSCOPY (EGD), COMBINED N/A 2021    Procedure: ESOPHAGOGASTRODUODENOSCOPY (EGD);  Surgeon: Leventhal, Thomas Michael, MD;  Location:  GI     ESOPHAGOSCOPY, GASTROSCOPY, DUODENOSCOPY (EGD), COMBINED N/A 10/01/2021    Procedure: ESOPHAGOGASTRODUODENOSCOPY (EGD) with varices banding;  Surgeon: Gregory Gabriel MD;  [facility]     GYN SURGERY      bilat fallopian tubes and ovaries removed     IR LIVER BIOPSY PERCUTANEOUS  2022     PICC DOUBLE LUMEN PLACEMENT Right 2021    42cm (2cm external), Lateral brachial vein     RETURN LIVER TRANSPLANT N/A 2021    Procedure: CLOSURE, WOUND, ABDOMEN, SECONDARY, ABDOMINAL WASHOUT;  Surgeon: Ernesto Schmitt MD;  Location:  OR     TRANSPLANT  2021    Liver due to PSC     TRANSPLANT LIVER RECIPIENT  DONOR N/A 2021    Procedure: Opening of abdomen, Abdominal Exploration and aborted liver transplant.;  Surgeon: Ernesto Schmitt MD;  [facility]     TRANSPLANT LIVER RECIPIENT  DONOR N/A 2021    Procedure: TRANSPLANT, LIVER, RECIPIENT,  DONOR;  Surgeon: Ernesto Schmitt MD;  Location:  OR       Social History:   reports that she has never smoked. She has never used smokeless tobacco. She reports that she does not drink alcohol and does not use drugs.    Family History:  Family History   Problem Relation Age of Onset     Cancer Mother         angiosarcoma     Hypertension Mother      Hyperlipidemia Mother      Other Cancer Mother      Coronary Artery Disease Early Onset Father         MI age 46     Heart Transplant Father       Coronary Artery Disease Father      Melanoma Sister      Skin Cancer Sister      Liver Disease No family hx of      Ulcerative Colitis No family hx of      Crohn's Disease No family hx of        Medications:  Current Outpatient Medications   Medication Sig Dispense Refill     tacrolimus (PROTOPIC) 0.1 % external ointment Apply topically 2 times daily 60 g 1     lisinopril (ZESTRIL) 10 MG tablet Take 1 tablet (10 mg) by mouth daily 90 tablet 0     multivitamin (CENTRUM SILVER) tablet Take 1 tablet by mouth daily       tacrolimus (GENERIC EQUIVALENT) 1 MG capsule Take 1 capsule (1 mg) by mouth every 12 hours 180 capsule 3     Allergies   Allergen Reactions     Diagnostic X-Ray Materials Hives     PATIENT HAD HIVE REACTION AFTER ADMINISTERING CT CONTRAST DYE.       Contrast Dye

## 2023-03-21 NOTE — LETTER
3/21/2023       RE: Berta Nava  [address] [facility] [id number]     Dear Colleague,    Thank you for referring your patient, Berta Nava, to the [facility] [place] [facility]. Please see a copy of my visit note below.    [facility] Dermatology Note    Encounter Date: Mar 21, 2023    Dermatology Problem List:  1. Liver transplant 11/18/2021  - Tacrolimus, prednisone, mycophenolate  2. Pilar cysts  - Pending excision  3. 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    Diagnoses and all orders for this visit:    Rash  -     NC PUNCH BIOPSY OF SKIN, FIRST/SINGLE LESION  -     Dermatological Path Order and Indications; Standing  --Rash comprised of small red macules on bilateral shins without a background of hemosiderin consistent with pigmented purpuric dermatosis-patient does have varicose veins  - Due to patient's history of transplant and resolving/resolved CMV viremia and patient is concerned about this being related to CMV will biopsy to rule out CMV related vasculitis although my suspicion for this is low  - Patient also has a fading pink patch on the right buttock slightly annular without overlying scale patient states that it has improved from what it used to be, it is possible that that this is also PPD, since it is improving we will monitor at the 1 month follow-up                Other atopic dermatitis  -     tacrolimus (PROTOPIC) 0.1 % external ointment; Apply topically 2 times daily  - Facial itching and scaling without significant acanthosis different morphology than what is on the legs  - Treat empirically with Protopic twice daily        Follow-up in 1 mo.        Staff Involved:  Staff Only    Huseyin Cole MD   of Dermatology  Department of Dermatology  [facility] [employer] [facility]      CC:   Chief Complaint   Patient presents with     Derm Problem     Patient has a rash all over that she believes started around February 15th and experience tingling, and pruritus all across her body. Patient concerned it could be related to her vaccines she received.        History of Present Illness:  Ms. Berta Nava is a 57 year old female who presents as a return patient.    Rash started in February. Had shingrix and pneumovax vaccines 02/15/22 thinks she had symptoms prior to that.     Currently on tacrolimus 1mg BID. Has been off mycophenolate quite a while ago.     Stopped taking maribavir 02/22/23.     Main area of symptoms is on face and eyelids and cheeks. Thought it could be contact allergy to comsetics, she stopped most of her make up and face creams     Labs:      Physical exam:  Vitals: LMP  (LMP Unknown)   GEN: well developed, well-nourished, in no acute distress, in a pleasant mood.     SKIN: Figueredo phototype 1  - Full skin, which includes the head/face, both arms, chest, back, abdomen,both legs, genitalia and/or groin buttocks, digits and/or nails, was examined.  -Scattered pink to red macules on bilateral shins  - Annular pink to red patch right buttock, fading  - No other lesions of concern on areas examined.     Past Medical History:   Past Medical History:   Diagnosis Date     Ascites      Biliary cirrhosis (H)      Cholangitis, sclerosing      Cirrhosis of liver with ascites (H) 03/03/2021     Hearing loss of left ear     wears a hearing aide     History of blood transfusion      History of low potassium      Hyperlipidemia      Hypertension      Liver transplant rejection (H) 05/17/2022    Mild Rejection     SBP (spontaneous bacterial peritonitis) (H) 04/30/2021     Sjogren's syndrome (H)      Ulcerative colitis (H)       Ulcerative pancolitis (H)      Past Surgical History:   Procedure Laterality Date     BENCH LIVER N/A 11/18/2021    Procedure: Bench liver;  Surgeon: Ernesto Schmitt MD;  Location: [facility] OR     COLONOSCOPY N/A 06/08/2022    Procedure: COLONOSCOPY, WITH BIOPSY;  Surgeon: Leventhal, Thomas Michael, MD;  Location: [facility] OR     COLONOSCOPY N/A 10/31/2022    Procedure: COLONOSCOPY, WITH POLYPECTOMY AND BIOPSY;  Surgeon: Kendrick Crawford MD;  Location: [facility] OR     COMBINED ESOPHAGOSCOPY, GASTROSCOPY, DUODENOSCOPY (EGD), REMOVE BILIARY STENT N/A 02/09/2022    Procedure: ESOPHAGOGASTRODUODENOSCOPY, WITH BILIARY STENT REMOVAL;  Surgeon: Leventhal, Thomas Michael, MD;  Location: [facility] OR     CV CORONARY ANGIOGRAM N/A 08/25/2021    Procedure: Coronary Angiogram with possible intervention;  Surgeon: David Wilhelm MD;  Location:  HEART CARDIAC CATH LAB     ENDOSCOPIC RETROGRADE CHOLANGIOPANCREATOGRAM N/A 06/25/2021    Procedure: ENDOSCOPIC RETROGRADE CHOLANGIOPANCREATOGRAPHY with ballon sweep of bile ducts for stones, balloon dilation of bile ducts and bile duct stent placement;  Surgeon: Gregory Gabriel MD;  Location: [facility] OR     ENDOSCOPIC RETROGRADE CHOLANGIOPANCREATOGRAM N/A 07/22/2021    Procedure: ENDOSCOPIC RETROGRADE CHOLANGIOPANCREATOGRAPHY STONE REMOVAL, DILATION AND STENT PLACEMENT;  Surgeon: Gregory Gabriel MD;  Location:  OR     ENDOSCOPIC RETROGRADE CHOLANGIOPANCREATOGRAPHY, EXCHANGE TUBE/STENT N/A 05/05/2021    Procedure: ENDOSCOPIC RETROGRADE CHOLANGIOPANCREATOGRAPHY WITH STENT EXCHANGE, STONE EXTRACTION, AND DILATION;  Surgeon: Gregory Gabriel MD;  Location: [facility] OR     ENDOSCOPIC RETROGRADE CHOLANGIOPANCREATOGRAPHY, EXCHANGE TUBE/STENT N/A 05/10/2021    Procedure: ENDOSCOPIC RETROGRADE CHOLANGIOPANCREATOGRAPHY with biliary dilation, stone removal, stent exchange;  Surgeon: Gregory Gabriel MD;  Location: [facility] OR     ENDOSCOPIC RETROGRADE CHOLANGIOPANCREATOGRAPHY, EXCHANGE TUBE/STENT N/A 06/10/2021     Procedure: ENDOSCOPIC RETROGRADE CHOLANGIOPANCREATOGRAPHY, WITH biliary stent exchange, stone removal;  Surgeon: Woodrow Lott MD;  Location: [facility] OR     ENDOSCOPIC RETROGRADE CHOLANGIOPANCREATOGRAPHY, EXCHANGE TUBE/STENT N/A 2021    Procedure: ENDOSCOPIC RETROGRADE CHOLANGIOPANCREATOGRAPHY with biliary dilation, stone removal, stent exchange;  Surgeon: Gregory Gabriel MD;  Location: [facility] OR     ENDOSCOPIC RETROGRADE CHOLANGIOPANCREATOGRAPHY, EXCHANGE TUBE/STENT N/A 10/01/2021    Procedure: ENDOSCOPIC RETROGRADE CHOLANGIOPANCREATOGRAPHY with balloon sweep of bile ducts, bile duct stent exchanged;  Surgeon: Gregory Gabriel MD;  Location: [facility] OR     ESOPHAGOSCOPY, GASTROSCOPY, DUODENOSCOPY (EGD), COMBINED N/A 2021    Procedure: ESOPHAGOGASTRODUODENOSCOPY (EGD);  Surgeon: Leventhal, Thomas Michael, MD;  Location: [facility]     ESOPHAGOSCOPY, GASTROSCOPY, DUODENOSCOPY (EGD), COMBINED N/A 10/01/2021    Procedure: ESOPHAGOGASTRODUODENOSCOPY (EGD) with varices banding;  Surgeon: Gregory Gabriel MD;  Location: [facility] OR     GYN SURGERY      bilat fallopian tubes and ovaries removed     IR LIVER BIOPSY PERCUTANEOUS  2022     PICC DOUBLE LUMEN PLACEMENT Right 2021    42cm (2cm external), Lateral brachial vein     RETURN LIVER TRANSPLANT N/A 2021    Procedure: CLOSURE, WOUND, ABDOMEN, SECONDARY, ABDOMINAL WASHOUT;  Surgeon: Ernesto Schmitt MD;  Location: [facility] OR     TRANSPLANT  2021    Liver due to PSC     TRANSPLANT LIVER RECIPIENT  DONOR N/A 2021    Procedure: Opening of abdomen, Abdominal Exploration and aborted liver transplant.;  Surgeon: Ernesto Schmitt MD;  Location: [facility] OR     TRANSPLANT LIVER RECIPIENT  DONOR N/A 2021    Procedure: TRANSPLANT, LIVER, RECIPIENT,  DONOR;  Surgeon: Ernesto Schmitt MD;  Location: [facility] OR       Social History:   reports that she has never smoked. She has never used smokeless tobacco. She reports that she does  not drink alcohol and does not use drugs.    Family History:  Family History   Problem Relation Age of Onset     Cancer Mother         angiosarcoma     Hypertension Mother      Hyperlipidemia Mother      Other Cancer Mother      Coronary Artery Disease Early Onset Father         MI age 46     Heart Transplant Father      Coronary Artery Disease Father      Melanoma Sister      Skin Cancer Sister      Liver Disease No family hx of      Ulcerative Colitis No family hx of      Crohn's Disease No family hx of        Medications:  Current Outpatient Medications   Medication Sig Dispense Refill     tacrolimus (PROTOPIC) 0.1 % external ointment Apply topically 2 times daily 60 g 1     lisinopril (ZESTRIL) 10 MG tablet Take 1 tablet (10 mg) by mouth daily 90 tablet 0     multivitamin (CENTRUM SILVER) tablet Take 1 tablet by mouth daily       tacrolimus (GENERIC EQUIVALENT) 1 MG capsule Take 1 capsule (1 mg) by mouth every 12 hours 180 capsule 3     Allergies   Allergen Reactions     Diagnostic X-Ray Materials Hives     PATIENT HAD HIVE REACTION AFTER ADMINISTERING CT CONTRAST DYE.       Contrast Dye                  Lidocaine-epinephrine 1-1:738618 % injection   1.5 mL once for one use, starting 3/21/2023 ending 3/21/2023,  2mL disp, R-0, injection  Injected by Dr. Cole

## 2023-03-21 NOTE — PATIENT INSTRUCTIONS
Wound Care After a Biopsy    What is a skin biopsy?  A skin biopsy allows the doctor to examine a very small piece of tissue under the microscope to determine the diagnosis and the best treatment for the skin condition. A local anesthetic (numbing medicine)  is injected with a very small needle into the skin area to be tested. A small piece of skin is taken from the area. Sometimes a suture (stitch) is used.     What are the risks of a skin biopsy?  I will experience scar, bleeding, swelling, pain, crusting and redness. I may experience incomplete removal or recurrence. Risks of this procedure are excessive bleeding, bruising, infection, nerve damage, numbness, thick (hypertrophic or keloidal) scar and non-diagnostic biopsy.    How should I care for my wound for the first 24 hours?  Keep the wound dry and covered for 24 hours  If it bleeds, hold direct pressure on the area for 15 minutes. If bleeding does not stop then go to the emergency room  Avoid strenuous exercise the first 1-2 days or as your doctor instructs you    How should I care for the wound after 24 hours?  After 24 hours, remove the bandage  You may bathe or shower as normal  If you had a scalp biopsy, you can shampoo as usual and can use shower water to clean the biopsy site daily  Clean the wound twice a day with gentle soap and water  Do not scrub, be gentle  Apply white petroleum/Vaseline after cleaning the wound with a cotton swab or a clean finger, and keep the site covered with a Bandaid /bandage. Bandages are not necessary with a scalp biopsy  If you are unable to cover the site with a Bandaid /bandage, re-apply ointment 2-3 times a day to keep the site moist. Moisture will help with healing  Avoid strenuous activity for first 1-2 days  Avoid lakes, rivers, pools, and oceans until the stitches are removed or the site is healed    How do I clean my wound?  Wash hands thoroughly with soap or use hand  before all wound care  Clean the  wound with gentle soap and water  Apply white petroleum/Vaseline  to wound after it is clean  Replace the Bandaid /bandage to keep the wound covered for the first few days or as instructed by your doctor  If you had a scalp biopsy, warm shower water to the area on a daily basis should suffice    What should I use to clean my wound?   Cotton-tipped applicators (Qtips )  White petroleum jelly (Vaseline ). Use a clean new container and use Q-tips to apply.  Bandaids   as needed  Gentle soap     How should I care for my wound long term?  Do not get your wound dirty  Keep up with wound care for one week or until the area is healed.  A small scab will form and fall off by itself when the area is completely healed. The area will be red and will become pink in color as it heals. Sun protection is very important for how your scar will turn out. Sunscreen with an SPF 30 or greater is recommended once the area is healed.  If you have stitches, stitches need to be removed in 7 days. You may return to our clinic for this or you may have it done locally at your doctor s office.  You should have some soreness but it should be mild and slowly go away over several days. Talk to your doctor about using tylenol for pain,    When should I call my doctor?  If you have increased:   Pain or swelling  Pus or drainage (clear or slightly yellow drainage is ok)  Temperature over 100F  Spreading redness or warmth around wound    When will I hear about my results?  The biopsy results can take 2 weeks to come back.  Your results will automatically release to [employer] before your provider has even reviewed them.  The clinic will call you with the results, send you a [employer] message, or have you schedule a follow-up clinic or phone time to discuss the results.  Contact our clinics if you do not hear from us in 2 weeks.    Who should I call with questions?  [facility]: [phone]  [facility],  [place]: [phone]  For urgent needs outside of business hours call the [facility] at [phone] and ask for the dermatology resident on call

## 2023-03-21 NOTE — NURSING NOTE
Dermatology Rooming Note    Berta Nava's goals for this visit include:   Chief Complaint   Patient presents with     Derm Problem     Patient has a rash all over that she believes started around February 15th and experience tingling, and pruritus all across her body. Patient concerned it could be related to her vaccines she received.      Ruslan Camacho, EMT-B

## 2023-03-22 ENCOUNTER — LAB (OUTPATIENT)
Dept: LAB | Facility: CLINIC | Age: 58
End: 2023-03-22
Payer: COMMERCIAL

## 2023-03-22 DIAGNOSIS — I10 ESSENTIAL HYPERTENSION: ICD-10-CM

## 2023-03-22 DIAGNOSIS — B25.9 CYTOMEGALOVIRUS (CMV) VIREMIA (H): ICD-10-CM

## 2023-03-22 DIAGNOSIS — R73.09 ELEVATED GLUCOSE: ICD-10-CM

## 2023-03-22 LAB
ANION GAP SERPL CALCULATED.3IONS-SCNC: 10 MMOL/L (ref 7–15)
BUN SERPL-MCNC: 15.6 MG/DL (ref 8–23)
CALCIUM SERPL-MCNC: 9.4 MG/DL (ref 8.6–10)
CHLORIDE SERPL-SCNC: 103 MMOL/L (ref 98–107)
CREAT SERPL-MCNC: 0.8 MG/DL (ref 0.51–0.95)
DEPRECATED HCO3 PLAS-SCNC: 24 MMOL/L (ref 22–29)
GFR SERPL CREATININE-BSD FRML MDRD: 84 ML/MIN/1.73M2
GLUCOSE SERPL-MCNC: 103 MG/DL (ref 70–99)
HBA1C MFR BLD: 4.6 % (ref 0–5.6)
POTASSIUM SERPL-SCNC: 4.4 MMOL/L (ref 3.4–5.3)
SODIUM SERPL-SCNC: 137 MMOL/L (ref 136–145)

## 2023-03-22 PROCEDURE — 83036 HEMOGLOBIN GLYCOSYLATED A1C: CPT

## 2023-03-22 PROCEDURE — 36415 COLL VENOUS BLD VENIPUNCTURE: CPT

## 2023-03-22 PROCEDURE — 80048 BASIC METABOLIC PNL TOTAL CA: CPT

## 2023-03-23 DIAGNOSIS — B25.9 CYTOMEGALOVIRUS (CMV) VIREMIA (H): Primary | ICD-10-CM

## 2023-03-23 LAB
CMV DNA SPEC NAA+PROBE-ACNC: NOT DETECTED IU/ML
PATH REPORT.COMMENTS IMP SPEC: NORMAL
PATH REPORT.FINAL DX SPEC: NORMAL
PATH REPORT.GROSS SPEC: NORMAL
PATH REPORT.MICROSCOPIC SPEC OTHER STN: NORMAL
PATH REPORT.RELEVANT HX SPEC: NORMAL

## 2023-04-04 ENCOUNTER — LAB (OUTPATIENT)
Dept: LAB | Facility: CLINIC | Age: 58
End: 2023-04-04
Payer: COMMERCIAL

## 2023-04-04 DIAGNOSIS — B25.9 CYTOMEGALOVIRUS (CMV) VIREMIA (H): ICD-10-CM

## 2023-04-04 PROCEDURE — 36415 COLL VENOUS BLD VENIPUNCTURE: CPT

## 2023-04-05 LAB
CMV DNA SPEC NAA+PROBE-ACNC: <137 IU/ML
CMV DNA SPEC NAA+PROBE-LOG#: <2.1 {LOG_COPIES}/ML

## 2023-04-10 ENCOUNTER — HOSPITAL ENCOUNTER (OUTPATIENT)
Dept: GENERAL RADIOLOGY | Facility: HOSPITAL | Age: 58
Discharge: HOME | End: 2023-04-10
Attending: PHYSICIAN ASSISTANT
Payer: COMMERCIAL

## 2023-04-10 ENCOUNTER — OFFICE VISIT (OUTPATIENT)
Dept: RHEUMATOLOGY | Facility: CLINIC | Age: 58
End: 2023-04-10
Payer: COMMERCIAL

## 2023-04-10 ENCOUNTER — LAB (OUTPATIENT)
Dept: LAB | Facility: CLINIC | Age: 58
End: 2023-04-10
Payer: COMMERCIAL

## 2023-04-10 VITALS
HEART RATE: 72 BPM | SYSTOLIC BLOOD PRESSURE: 120 MMHG | DIASTOLIC BLOOD PRESSURE: 68 MMHG | BODY MASS INDEX: 28.12 KG/M2 | WEIGHT: 169 LBS

## 2023-04-10 DIAGNOSIS — R52 GENERALIZED BODY ACHES: ICD-10-CM

## 2023-04-10 DIAGNOSIS — R52 GENERALIZED BODY ACHES: Primary | ICD-10-CM

## 2023-04-10 DIAGNOSIS — M25.50 POLYARTHRALGIA: ICD-10-CM

## 2023-04-10 LAB
CCP AB SER IA-ACNC: 2.6 U/ML
CK SERPL-CCNC: 58 U/L (ref 26–192)
CRP SERPL-MCNC: <3 MG/L
ERYTHROCYTE [SEDIMENTATION RATE] IN BLOOD BY WESTERGREN METHOD: 20 MM/HR (ref 0–20)
RHEUMATOID FACT SER NEPH-ACNC: 15 IU/ML

## 2023-04-10 PROCEDURE — 73600 X-RAY EXAM OF ANKLE: CPT | Mod: 50

## 2023-04-10 PROCEDURE — 82085 ASSAY OF ALDOLASE: CPT | Mod: 90

## 2023-04-10 PROCEDURE — 86200 CCP ANTIBODY: CPT

## 2023-04-10 PROCEDURE — 86036 ANCA SCREEN EACH ANTIBODY: CPT | Mod: XU

## 2023-04-10 PROCEDURE — 82550 ASSAY OF CK (CPK): CPT

## 2023-04-10 PROCEDURE — 99000 SPECIMEN HANDLING OFFICE-LAB: CPT

## 2023-04-10 PROCEDURE — 72170 X-RAY EXAM OF PELVIS: CPT

## 2023-04-10 PROCEDURE — 36415 COLL VENOUS BLD VENIPUNCTURE: CPT

## 2023-04-10 PROCEDURE — 85652 RBC SED RATE AUTOMATED: CPT

## 2023-04-10 PROCEDURE — 86037 ANCA TITER EACH ANTIBODY: CPT

## 2023-04-10 PROCEDURE — 86431 RHEUMATOID FACTOR QUANT: CPT

## 2023-04-10 PROCEDURE — 86140 C-REACTIVE PROTEIN: CPT

## 2023-04-10 PROCEDURE — 99204 OFFICE O/P NEW MOD 45 MIN: CPT | Performed by: PHYSICIAN ASSISTANT

## 2023-04-10 NOTE — PATIENT INSTRUCTIONS
After Visit Instructions:     Thank you for coming to [facility] Rheumatology for your care. It is my goal to partner with you to help you reach your optimal state of health.       Plan:     Schedule follow-up with Isabel Momin PA-C as needed  Imaging: xray pelvis and hips, xray ankle  Labs: CCP antibody, rheumatoid factor, CRP, Sed Rate, CK, Aldolase, ANCA      Isabel Momin PA-C  [facility]  [facility]    Contact information: [facility]  Clinic Number:  [phone]  Please call or send a [employer] message with any questions about your care

## 2023-04-10 NOTE — PROGRESS NOTES
Rheumatology Clinic Visit  [facility]  NANCI Gonzalez     Berta Nava MRN# [id number]   YOB: 1965 Age: 57 year old   Date of Visit: 04/10/2023  Primary care provider: Afshan Penaloza          Assessment and Plan:     1. Generalized body aches  2. Polyarthralgia    Patient presents today for an initial evaluation of generalized body aches/polyarthralgias.  She states that this started in August and September of last year.  At that time it was very severe and was prohibiting her activity due to the pain that she was having.  She had started on an antiviral medication for CMV in June.  She has since been off the medication and has noticed improvement.  She did however notice improvement even while on the medication.  She still does periodically notice some soreness in her upper arms.  She also gets pain in her hips and ankles at times.  She does have stiffness when she wakes up in the morning but this lasts for a very short period of time until she is up and moving.  Yesterday she was able to walk 2 miles.  Physical examination does not show any active synovitis, dactylitis, tenosynovitis or enthesopathy.  She had full joint range of motion.  No tenderness to the spinal musculature or point tenderness to SI joints.  Previous laboratory evaluations were reviewed.  Results below.    We will do a further work-up given the generalized body aches and polyarthralgias that she has been having.  As she notes continued improvement, although not a known side effect of the antiviral, it could have been secondary to that medication now that she is off of it and continues to improve.  She will continue to monitor.  There is the possibility of an inflammatory arthropathy secondary to her ulcerative colitis, however there is no evidence of an inflammatory arthropathy on physical examination today.  I will contact the patient after the completion of the further work-up.    Plan:     1. Schedule follow-up  "with Isbael Momin PA-C as needed  2. Imaging: xray pelvis and hips, xray ankle  3. Labs: CCP antibody, rheumatoid factor, CRP, Sed Rate, CK, Aldolase, ANCA    Isabel Momin, PAC  Rheumatology         History of Present Illness:   Berta Nava presents for evaluation of body aches, joint pain. History of ulcerative pancolitis (most recent colonoscopy did not show any active disease). S/P liver transplant.     She states that she was having severe joint and muscle pain in Aug and Sept. She is s/p liver transplant in 09/2021 and got CMV. She was seeing infectious disease to get rid of it. She was on an antiviral that she had started in June. She had talked with infectious disease to see if it was related to the drug and there had not been any reports of joint/muscle pain. She has been off of it for about 6 weeks. She states that her joint and muscle pain has since subsided. She was unable to walk while sore. She states that she is fairly active, but does have some pain in the left hip. She walked a couple of miles yesterday. She has some pain in her ankles when getting up. She states that she still feels that she is \"way better\" than she was in the fall. She thinks there may have been some swelling in her feet. She thinks there was swelling in her left arm, but has gained weight and is unsure if it true swelling. She has some stiffness when she wakes up. It does get better once she is up and moving.     No history of uveitis or iritis. She has had some rash on her legs. Biopsy with derm was negative. She has some rash on her face still. No fevers. No shortness of breath.     No family history of autoimmune disorders.          Review of Systems:     Constitutional: negative  Skin: negative  Eyes: negative  Ears/Nose/Throat: negative  Respiratory: No shortness of breath, dyspnea on exertion, cough, or hemoptysis  Cardiovascular: negative  Gastrointestinal: negative  Genitourinary: negative  Musculoskeletal: as " above  Neurologic: negative  Psychiatric: negative  Hematologic/Lymphatic/Immunologic: negative  Endocrine: negative         Active Problem List:     Patient Active Problem List    Diagnosis Date Noted     Liver transplant rejection (H) 05/17/2022     Priority: Medium     Mild Rejection       Anemia due to chronic kidney disease 11/30/2021     Priority: Medium     Immunosuppressed status (H) 11/29/2021     Priority: Medium     S/P liver transplant (H) 11/29/2021     Priority: Medium     Acute kidney failure, unspecified (H) 09/02/2021     Priority: Medium     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     Priority: Medium     Added automatically from request for surgery [id number]       Ulcerative pancolitis with complication (H) 06/12/2019     Priority: Medium     Cholangitis 05/13/2018     Priority: Medium     Hypokalemia 05/13/2018     Priority: Medium     Sicca syndrome (H) 12/11/2017     Priority: Medium     Hyperlipidemia 02/23/2012     Priority: Medium     Mixed hearing loss, bilateral 01/07/2009     Priority: Medium     Neoplasm of uncertain behavior of skin 01/26/2007     Priority: Medium     Overview:   LW Modifier:  moderate DN- left buttock  ; Dysplastic Nevus  Overview:   LW Modifier:  recurrent- right upper back  ; Dysplastic Nevus       Essential hypertension 06/07/2003     Priority: Medium     Overview:   Hypertension              Past Medical History:     Past Medical History:   Diagnosis Date     Ascites      Biliary cirrhosis (H)      Cholangitis, sclerosing      Cirrhosis of liver with ascites (H) 03/03/2021     Hearing loss of left ear     wears a hearing aide     History of blood transfusion      History of low potassium      Hyperlipidemia      Hypertension      Liver transplant rejection (H) 05/17/2022    Mild Rejection     SBP (spontaneous bacterial peritonitis) (H) 04/30/2021     Sjogren's syndrome (H)      Ulcerative colitis (H)      Ulcerative pancolitis (H)      Past Surgical History:    Procedure Laterality Date     BENCH LIVER N/A 11/18/2021    Procedure: Bench liver;  Surgeon: Ernesto Schmitt MD;  Location: [facility] OR     COLONOSCOPY N/A 06/08/2022    Procedure: COLONOSCOPY, WITH BIOPSY;  Surgeon: Leventhal, Thomas Michael, MD;  Location: [facility] OR     COLONOSCOPY N/A 10/31/2022    Procedure: COLONOSCOPY, WITH POLYPECTOMY AND BIOPSY;  Surgeon: Kendrick Crawford MD;  Location: [facility] OR     COMBINED ESOPHAGOSCOPY, GASTROSCOPY, DUODENOSCOPY (EGD), REMOVE BILIARY STENT N/A 02/09/2022    Procedure: ESOPHAGOGASTRODUODENOSCOPY, WITH BILIARY STENT REMOVAL;  Surgeon: Leventhal, Thomas Michael, MD;  Location: [facility] OR     CV CORONARY ANGIOGRAM N/A 08/25/2021    Procedure: Coronary Angiogram with possible intervention;  Surgeon: David Wilhelm MD;  Location:  [facility]ATH LAB     ENDOSCOPIC RETROGRADE CHOLANGIOPANCREATOGRAM N/A 06/25/2021    Procedure: ENDOSCOPIC RETROGRADE CHOLANGIOPANCREATOGRAPHY with ballon sweep of bile ducts for stones, balloon dilation of bile ducts and bile duct stent placement;  Surgeon: Gregory Gabriel MD;  Location: [facility] OR     ENDOSCOPIC RETROGRADE CHOLANGIOPANCREATOGRAM N/A 07/22/2021    Procedure: ENDOSCOPIC RETROGRADE CHOLANGIOPANCREATOGRAPHY STONE REMOVAL, DILATION AND STENT PLACEMENT;  Surgeon: Gregory Gabriel MD;  Location:  OR     ENDOSCOPIC RETROGRADE CHOLANGIOPANCREATOGRAPHY, EXCHANGE TUBE/STENT N/A 05/05/2021    Procedure: ENDOSCOPIC RETROGRADE CHOLANGIOPANCREATOGRAPHY WITH STENT EXCHANGE, STONE EXTRACTION, AND DILATION;  Surgeon: Gregory Gabriel MD;  Location: [facility] OR     ENDOSCOPIC RETROGRADE CHOLANGIOPANCREATOGRAPHY, EXCHANGE TUBE/STENT N/A 05/10/2021    Procedure: ENDOSCOPIC RETROGRADE CHOLANGIOPANCREATOGRAPHY with biliary dilation, stone removal, stent exchange;  Surgeon: Gregory Gabriel MD;  Location: [facility] OR     ENDOSCOPIC RETROGRADE CHOLANGIOPANCREATOGRAPHY, EXCHANGE TUBE/STENT N/A 06/10/2021    Procedure: ENDOSCOPIC RETROGRADE  CHOLANGIOPANCREATOGRAPHY, WITH biliary stent exchange, stone removal;  Surgeon: Woodrow Lott MD;  Location: [facility] OR     ENDOSCOPIC RETROGRADE CHOLANGIOPANCREATOGRAPHY, EXCHANGE TUBE/STENT N/A 2021    Procedure: ENDOSCOPIC RETROGRADE CHOLANGIOPANCREATOGRAPHY with biliary dilation, stone removal, stent exchange;  Surgeon: Gregory Gabriel MD;  Location: [facility] OR     ENDOSCOPIC RETROGRADE CHOLANGIOPANCREATOGRAPHY, EXCHANGE TUBE/STENT N/A 10/01/2021    Procedure: ENDOSCOPIC RETROGRADE CHOLANGIOPANCREATOGRAPHY with balloon sweep of bile ducts, bile duct stent exchanged;  Surgeon: Gregory Gabriel MD;  Location: [facility] OR     ESOPHAGOSCOPY, GASTROSCOPY, DUODENOSCOPY (EGD), COMBINED N/A 2021    Procedure: ESOPHAGOGASTRODUODENOSCOPY (EGD);  Surgeon: Leventhal, Thomas Michael, MD;  Location: [facility] GI     ESOPHAGOSCOPY, GASTROSCOPY, DUODENOSCOPY (EGD), COMBINED N/A 10/01/2021    Procedure: ESOPHAGOGASTRODUODENOSCOPY (EGD) with varices banding;  Surgeon: Gregory Gabriel MD;  Location: [facility] OR     GYN SURGERY      bilat fallopian tubes and ovaries removed     IR LIVER BIOPSY PERCUTANEOUS  2022     PICC DOUBLE LUMEN PLACEMENT Right 2021    42cm (2cm external), Lateral brachial vein     RETURN LIVER TRANSPLANT N/A 2021    Procedure: CLOSURE, WOUND, ABDOMEN, SECONDARY, ABDOMINAL WASHOUT;  Surgeon: Ernesto Schmitt MD;  Location: [facility] OR     TRANSPLANT  2021    Liver due to PSC     TRANSPLANT LIVER RECIPIENT  DONOR N/A 2021    Procedure: Opening of abdomen, Abdominal Exploration and aborted liver transplant.;  Surgeon: Ernesto Schmitt MD;  Location: [facility] OR     TRANSPLANT LIVER RECIPIENT  DONOR N/A 2021    Procedure: TRANSPLANT, LIVER, RECIPIENT,  DONOR;  Surgeon: Ernesto Schmitt MD;  Location: [facility] OR            Social History:     Social History     Socioeconomic History     Marital status:      Spouse name: Not on file     Number of children: Not on  file     Years of education: Not on file     Highest education level: Not on file   Occupational History     Not on file   Tobacco Use     Smoking status: Never     Smokeless tobacco: Never   Vaping Use     Vaping status: Never Used     Passive vaping exposure: Yes   Substance and Sexual Activity     Alcohol use: No     Comment: Last drink was 2017     Drug use: No     Sexual activity: Not Currently     Partners: Male     Birth control/protection: Post-menopausal   Other Topics Concern     Parent/sibling w/ CABG, MI or angioplasty before 65F 55M? Yes     Comment: Father had heart attack and received heart transplant   Social History Narrative     Not on file     Social Determinants of Health     Financial Resource Strain: Not on file   Food Insecurity: Not on file   Transportation Needs: Not on file   Physical Activity: Not on file   Stress: Not on file   Social Connections: Not on file   Intimate Partner Violence: Not on file   Housing Stability: Not on file          Family History:     Family History   Problem Relation Age of Onset     Cancer Mother         angiosarcoma     Hypertension Mother      Hyperlipidemia Mother      Other Cancer Mother      Coronary Artery Disease Early Onset Father         MI age 46     Heart Transplant Father      Coronary Artery Disease Father      Melanoma Sister      Skin Cancer Sister      Liver Disease No family hx of      Ulcerative Colitis No family hx of      Crohn's Disease No family hx of             Allergies:     Allergies   Allergen Reactions     Diagnostic X-Ray Materials Hives     PATIENT HAD HIVE REACTION AFTER ADMINISTERING CT CONTRAST DYE.       Contrast Dye             Medications:     Current Outpatient Medications   Medication Sig Dispense Refill     lisinopril (ZESTRIL) 10 MG tablet Take 1 tablet (10 mg) by mouth daily 90 tablet 0     multivitamin (CENTRUM SILVER) tablet Take 1 tablet by mouth daily       tacrolimus (GENERIC EQUIVALENT) 1 MG capsule Take 1 capsule  (1 mg) by mouth every 12 hours 180 capsule 3     tacrolimus (PROTOPIC) 0.1 % external ointment Apply topically 2 times daily 60 g 1            Physical Exam:   not currently breastfeeding.  Wt Readings from Last 6 Encounters:   03/15/23 76.2 kg (168 lb)   02/14/23 70.3 kg (155 lb)   12/05/22 74.8 kg (165 lb)   11/03/22 74.7 kg (164 lb 11.2 oz)   07/14/22 75.3 kg (165 lb 14.4 oz)   06/14/22 71.7 kg (158 lb)     Constitutional: well-developed, appearing stated age; cooperative  Eyes: nl PERRLA, conjunctiva, sclera  ENT: nl external ears, nose, hearing, lips, teeth, gums, throat. No mucositis.   No mucous membrane lesions, normal saliva pool  Neck: no mass or thyroid enlargement  Resp: lungs clear to auscultation  CV: RRR, no murmurs, rubs or gallops, no edema  Lymph: no cervical, supraclavicular or epitrochlear nodes  MS: The TMJ, neck, shoulder, elbow, wrist, MCP/PIP/DIP, spine,  knee, ankle, and foot MTP/IP joints were examined and found normal. No active synovitis or altered joint anatomy. Full joint ROM. Normal  strength. No dactylitis,  tenosynovitis, enthesopathy.   Skin: no nail pitting, alopecia, rash, nodules or lesions.   Psych: nl judgement, orientation, memory, affect.           Data:   Imaging:  No joint imaging     Laboratory:  3/7/2023  CBC within normal limits  Urine negative for protein  Creatinine 0.76, GFR 90  Albumin 4.4  ALT 19, AST 24    3/22/2023  Creatinine 0.80, GFR 84  Hemoglobin A1c 4.6

## 2023-04-11 LAB
ALDOLASE SERPL-CCNC: 5.8 U/L
ANCA AB PATTERN SER IF-IMP: NORMAL
C-ANCA TITR SER IF: NORMAL {TITER}

## 2023-04-18 ENCOUNTER — LAB (OUTPATIENT)
Dept: LAB | Facility: CLINIC | Age: 58
End: 2023-04-18
Payer: COMMERCIAL

## 2023-04-18 ENCOUNTER — OFFICE VISIT (OUTPATIENT)
Dept: DERMATOLOGY | Facility: CLINIC | Age: 58
End: 2023-04-18
Payer: COMMERCIAL

## 2023-04-18 DIAGNOSIS — B25.9 CYTOMEGALOVIRUS (CMV) VIREMIA (H): ICD-10-CM

## 2023-04-18 DIAGNOSIS — L81.7 PIGMENTED PURPURIC DERMATOSIS: ICD-10-CM

## 2023-04-18 DIAGNOSIS — L71.9 ROSACEA: Primary | ICD-10-CM

## 2023-04-18 PROCEDURE — 99214 OFFICE O/P EST MOD 30 MIN: CPT | Mod: GC | Performed by: STUDENT IN AN ORGANIZED HEALTH CARE EDUCATION/TRAINING PROGRAM

## 2023-04-18 PROCEDURE — 36415 COLL VENOUS BLD VENIPUNCTURE: CPT

## 2023-04-18 NOTE — PROGRESS NOTES
I have personally examined this patient and agree with the resident doctor's documentation and plan of care. I have reviewed and amended the resident's note. The documentation accurately reflects my clinical observations, diagnoses, treatment and follow-up plans.     Huseyin Cole MD  Dermatology Staff      [facility] Dermatology Note  Encounter Date: Apr 18, 2023  Office Visit     Dermatology Problem List:  1. Liver transplant 11/18/2021  - Tacrolimus, prednisone, mycophenolate  2. Pilar cysts  - Pending excision  3. Hx BCC in her 20s, unknown location  4. 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 Appears to be stable/slightly improving. Again discussed benign nature. Discussed treatment options including oral rutoside and ascorbic acid. With history of liver transplant, not interested in adding additional treatments.    - Follow-up in September    # Mild Rosacea  Has developed reddening of the forehead and cheeks following warm showers. On exam, small dilated vessels observed on her cheeks. Consistent with rosacea. Discussed benign nature and treatment option available including topical Metrogel, laser therapy. Patient elected for watchful waiting   - Counseled on daily sunscreen use   - Follow-up in September   - Continue Protopic    Procedures Performed:   None     Follow-up: 6 month(s) in-person, or earlier for new or changing lesions    Staff and Resident:     Devon Arzate MD PGY-2  [facility]      ____________________________________________    CC: No chief complaint on file.    HPI:  Ms. Berta Nava is a(n) 57 year old female who presents today as a return patient  "for re-evaluation of pigmented purpuric dermatosis, confirmed with biopsy.    Was seen on 3/21/23 for new rash on lower extremities and buttock. Biopsy completed demonstrating \"Superficial perivascular lymphocytic infiltrate with erythrocyte extravasation\"    Today, rash on legs, buttock slight improvement. No longer itchy.     Has noticed increased facial redness, typically present after warm shower. Non-painful, not itchy. Has tried alternativefacial creams/make-up as she thought that may be contributing. No improvement with these changes. Has been using protopic two times per day.     Patient is otherwise feeling well, without additional skin concerns.    Labs Reviewed:  N/A    Physical Exam:  Vitals: LMP  (LMP Unknown)   SKIN: Focused examination of face and lower extremities was performed.  - scattered pink red macules on bilateral shins, improved from prior.  -Face with small dilated blood vessels on the cheeks   - No other lesions of concern on areas examined.     Medications:  Current Outpatient Medications   Medication     lisinopril (ZESTRIL) 10 MG tablet     multivitamin (CENTRUM SILVER) tablet     tacrolimus (GENERIC EQUIVALENT) 1 MG capsule     tacrolimus (PROTOPIC) 0.1 % external ointment     No current facility-administered medications for this visit.      Past Medical History:   Patient Active Problem List   Diagnosis     Cholangitis     Hypokalemia     Essential hypertension     Hyperlipidemia     Mixed hearing loss, bilateral     Neoplasm of uncertain behavior of skin     Sicca syndrome (H)     Ulcerative pancolitis with complication (H)     Primary sclerosing cholangitis     Acute kidney failure, unspecified (H)     Immunosuppressed status (H)     S/P liver transplant (H)     Anemia due to chronic kidney disease     Liver transplant rejection (H)     Past Medical History:   Diagnosis Date     Ascites      Biliary cirrhosis (H)      Cholangitis, sclerosing      Cirrhosis of liver with ascites (H) " 03/03/2021     Hearing loss of left ear     wears a hearing aide     History of blood transfusion      History of low potassium      Hyperlipidemia      Hypertension      Liver transplant rejection (H) 05/17/2022    Mild Rejection     SBP (spontaneous bacterial peritonitis) (H) 04/30/2021     Sjogren's syndrome (H)      Ulcerative colitis (H)      Ulcerative pancolitis (H)        CC Afshan Penaloza, APRN CNP  [address] [place] [address] on close of this encounter.

## 2023-04-18 NOTE — NURSING NOTE
Dermatology Rooming Note    Berta Nava's goals for this visit include:   Chief Complaint   Patient presents with     Derm Problem     Patient states that the rash has dissipated across her body and is still present on her face.       Ruslan Camacho, EMT-B

## 2023-04-18 NOTE — LETTER
4/18/2023       RE: Berta Nava  [address] [facility] [id number]     Dear Colleague,    Thank you for referring your patient, Berta Nava, to the [facility]. Please see a copy of my visit note below.    [facility] Dermatology Note  Encounter Date: Apr 18, 2023  Office Visit     Dermatology Problem List:  1. Liver transplant 11/18/2021  - Tacrolimus, prednisone, mycophenolate  2. Pilar cysts  - Pending excision  3. Hx BCC in her 20s, unknown location  4. 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 Appears to be stable/slightly improving. Again discussed benign nature. Discussed treatment options including oral rutoside and ascorbic acid. With history of liver transplant, not interested in adding additional treatments.    - Follow-up in September    # Mild Rosacea  Has developed reddening of the forehead and cheeks following warm showers. On exam, small dilated vessels observed on her cheeks. Consistent with rosacea. Discussed benign nature and treatment option available including topical Metrogel, laser therapy. Patient elected for watchful waiting   - Counseled on daily sunscreen use   - Follow-up in September   - Continue Protopic    Procedures Performed:   None     Follow-up: 6 month(s) in-person, or earlier for new or changing lesions    Staff and Resident:     Devon Arzate MD PGY-2  [facility]      ____________________________________________    CC: No chief complaint on file.    HPI:  Ms. Berta Nava is a(n) 57 year old female who presents today as a  "return patient for re-evaluation of pigmented purpuric dermatosis, confirmed with biopsy.    Was seen on 3/21/23 for new rash on lower extremities and buttock. Biopsy completed demonstrating \"Superficial perivascular lymphocytic infiltrate with erythrocyte extravasation\"    Today, rash on legs, buttock slight improvement. No longer itchy.     Has noticed increased facial redness, typically present after warm shower. Non-painful, not itchy. Has tried alternativefacial creams/make-up as she thought that may be contributing. No improvement with these changes. Has been using protopic two times per day.     Patient is otherwise feeling well, without additional skin concerns.    Labs Reviewed:  N/A    Physical Exam:  Vitals: LMP  (LMP Unknown)   SKIN: Focused examination of face and lower extremities was performed.  - scattered pink red macules on bilateral shins, improved from prior.  -Face with small dilated blood vessels on the cheeks   - No other lesions of concern on areas examined.     Medications:  Current Outpatient Medications   Medication    lisinopril (ZESTRIL) 10 MG tablet    multivitamin (CENTRUM SILVER) tablet    tacrolimus (GENERIC EQUIVALENT) 1 MG capsule    tacrolimus (PROTOPIC) 0.1 % external ointment     No current facility-administered medications for this visit.      Past Medical History:   Patient Active Problem List   Diagnosis    Cholangitis    Hypokalemia    Essential hypertension    Hyperlipidemia    Mixed hearing loss, bilateral    Neoplasm of uncertain behavior of skin    Sicca syndrome (H)    Ulcerative pancolitis with complication (H)    Primary sclerosing cholangitis    Acute kidney failure, unspecified (H)    Immunosuppressed status (H)    S/P liver transplant (H)    Anemia due to chronic kidney disease    Liver transplant rejection (H)     Past Medical History:   Diagnosis Date    Ascites     Biliary cirrhosis (H)     Cholangitis, sclerosing     Cirrhosis of liver with ascites (H) " 03/03/2021    Hearing loss of left ear     wears a hearing aide    History of blood transfusion     History of low potassium     Hyperlipidemia     Hypertension     Liver transplant rejection (H) 05/17/2022    Mild Rejection    SBP (spontaneous bacterial peritonitis) (H) 04/30/2021    Sjogren's syndrome (H)     Ulcerative colitis (H)     Ulcerative pancolitis (H)        CC Afshan Penaloza, APRN CNP  [address] [place] [address] on close of this encounter.

## 2023-04-19 LAB — CMV DNA SPEC NAA+PROBE-ACNC: NOT DETECTED IU/ML

## 2023-04-21 ENCOUNTER — TELEPHONE (OUTPATIENT)
Dept: TRANSPLANT | Facility: CLINIC | Age: 58
End: 2023-04-21
Payer: COMMERCIAL

## 2023-04-21 NOTE — TELEPHONE ENCOUNTER
Message from Dr. Dasilva:    Forest Dasilva MD sent to Vanessa Durant, RN  Jevon Mendez,     The CMV has been either undetected or <137 for the past 2 months.   Unless you are planning to increase her TAC dose or targeted trough level, there would no indications to check CMV anymore.   You may check CMV per your protocol or if you are planning to increase or escalate her IS therapy.     Thank you,   K     Call to Berta.  She is aware as Dr. Dasilva discussed this w/ her.

## 2023-04-29 ENCOUNTER — OFFICE VISIT (OUTPATIENT)
Dept: OPHTHALMOLOGY | Facility: CLINIC | Age: 58
End: 2023-04-29
Payer: COMMERCIAL

## 2023-04-29 DIAGNOSIS — H43.812 PVD (POSTERIOR VITREOUS DETACHMENT), LEFT: ICD-10-CM

## 2023-04-29 DIAGNOSIS — H52.4 MYOPIA OF BOTH EYES WITH ASTIGMATISM AND PRESBYOPIA: ICD-10-CM

## 2023-04-29 DIAGNOSIS — H52.13 MYOPIA OF BOTH EYES WITH ASTIGMATISM AND PRESBYOPIA: ICD-10-CM

## 2023-04-29 DIAGNOSIS — H25.813 COMBINED FORMS OF AGE-RELATED CATARACT OF BOTH EYES: Primary | ICD-10-CM

## 2023-04-29 DIAGNOSIS — H52.203 MYOPIA OF BOTH EYES WITH ASTIGMATISM AND PRESBYOPIA: ICD-10-CM

## 2023-04-29 PROCEDURE — 92015 DETERMINE REFRACTIVE STATE: CPT | Performed by: OPTOMETRIST

## 2023-04-29 PROCEDURE — 92004 COMPRE OPH EXAM NEW PT 1/>: CPT | Performed by: OPTOMETRIST

## 2023-04-29 ASSESSMENT — VISUAL ACUITY
OD_SC+: -2
OD_SC: 20/70
OD_PH_SC+: -2
OS_SC: 20/20
OD_PH_SC: 20/30
METHOD: SNELLEN - LINEAR

## 2023-04-29 ASSESSMENT — REFRACTION_MANIFEST
OD_CYLINDER: +0.25
OD_ADD: +2.50
OS_ADD: +2.50
OS_AXIS: 049
OD_SPHERE: -2.50
OS_SPHERE: -0.25
OS_CYLINDER: +0.50
OD_AXIS: 078

## 2023-04-29 ASSESSMENT — CONF VISUAL FIELD
OD_SUPERIOR_TEMPORAL_RESTRICTION: 0
OS_INFERIOR_NASAL_RESTRICTION: 0
OS_INFERIOR_TEMPORAL_RESTRICTION: 0
OS_SUPERIOR_TEMPORAL_RESTRICTION: 0
OD_NORMAL: 1
OD_INFERIOR_TEMPORAL_RESTRICTION: 0
OD_SUPERIOR_NASAL_RESTRICTION: 0
OD_INFERIOR_NASAL_RESTRICTION: 0
OS_SUPERIOR_NASAL_RESTRICTION: 0
OS_NORMAL: 1
METHOD: COUNTING FINGERS

## 2023-04-29 ASSESSMENT — EXTERNAL EXAM - LEFT EYE: OS_EXAM: NORMAL

## 2023-04-29 ASSESSMENT — TONOMETRY
IOP_METHOD: ICARE
OS_IOP_MMHG: 16
OD_IOP_MMHG: 16

## 2023-04-29 ASSESSMENT — SLIT LAMP EXAM - LIDS
COMMENTS: NORMAL
COMMENTS: NORMAL

## 2023-04-29 ASSESSMENT — EXTERNAL EXAM - RIGHT EYE: OD_EXAM: NORMAL

## 2023-04-29 ASSESSMENT — CUP TO DISC RATIO
OS_RATIO: 0.15
OD_RATIO: 0.15

## 2023-04-29 NOTE — PROGRESS NOTES
Assessment & Plan       Berta Nava is a 57 year old female with the following diagnoses:   1. Combined forms of age-related cataract of both eyes - Both Eyes    2. PVD (posterior vitreous detachment), left - Left Eye    3. Myopia of both eyes with astigmatism and presbyopia - Both Eyes          cataracts follow   PVD left eye Discussed vitreoul floaters and PVD   Educated patient on signs and symptoms of retinal detachment including increase in flashes, floaters, or a change in vision. If symptoms present, return to clinic immediately.   New prescription for glasses   yearly exam     Patient disposition:   Return in about 1 year (around 4/29/2024).          Complete documentation of historical and exam elements from today's encounter can be found in the full encounter summary report (not reduplicated in this progress note). I personally obtained the chief complaint(s) and history of present illness.  I confirmed and edited as necessary the review of systems, past medical/surgical history, family history, social history, and examination findings as documented by others; and I examined the patient myself. I personally reviewed the relevant tests, images, and reports as documented above. I formulated and edited as necessary the assessment and plan and discussed the findings and management plan with the patient and family.  Dr. Willie Alba

## 2023-04-29 NOTE — PATIENT INSTRUCTIONS
cataracts follow   PVD left eye Discussed vitreoul floaters and PVD   Educated patient on signs and symptoms of retinal detachment including increase in flashes, floaters, or a change in vision. If symptoms present, return to clinic immediately.   New prescription for glasses   yearly exam

## 2023-04-29 NOTE — NURSING NOTE
Chief Complaints and History of Present Illnesses   Patient presents with     Annual Eye Exam     Chief Complaint(s) and History of Present Illness(es)     Annual Eye Exam            Laterality: both eyes    Associated symptoms: itching.  Negative for eye pain, redness, photophobia, swelling and foreign body sensation    Treatments tried: no treatments    Pain scale: 0/10          Comments    Patient present for annual exam. Patient was last seen over 10 years ago. Patient is newly diagnosed with rosacea and is curios about ocular rosacea. Patient has flashes and floaters last week out of left eye but have since gone away.   LEX Schwab April 29, 2023 7:38 AM

## 2023-05-08 ENCOUNTER — LAB (OUTPATIENT)
Dept: LAB | Facility: CLINIC | Age: 58
End: 2023-05-08
Payer: COMMERCIAL

## 2023-05-08 DIAGNOSIS — Z94.4 S/P LIVER TRANSPLANT (H): ICD-10-CM

## 2023-05-08 DIAGNOSIS — Z94.4 LIVER TRANSPLANTED (H): ICD-10-CM

## 2023-05-08 LAB
ALBUMIN SERPL BCG-MCNC: 4.2 G/DL (ref 3.5–5.2)
ALP SERPL-CCNC: 70 U/L (ref 35–104)
ALT SERPL W P-5'-P-CCNC: 29 U/L (ref 10–35)
ANION GAP SERPL CALCULATED.3IONS-SCNC: 11 MMOL/L (ref 7–15)
AST SERPL W P-5'-P-CCNC: 34 U/L (ref 10–35)
BASOPHILS # BLD MANUAL: 0.1 10E3/UL (ref 0–0.2)
BASOPHILS NFR BLD MANUAL: 1 %
BILIRUB DIRECT SERPL-MCNC: <0.2 MG/DL (ref 0–0.3)
BILIRUB SERPL-MCNC: 0.5 MG/DL
BUN SERPL-MCNC: 21.3 MG/DL (ref 8–23)
CALCIUM SERPL-MCNC: 9.2 MG/DL (ref 8.6–10)
CHLORIDE SERPL-SCNC: 105 MMOL/L (ref 98–107)
CREAT SERPL-MCNC: 0.84 MG/DL (ref 0.51–0.95)
DEPRECATED HCO3 PLAS-SCNC: 21 MMOL/L (ref 22–29)
EOSINOPHIL # BLD MANUAL: 0.3 10E3/UL (ref 0–0.7)
EOSINOPHIL NFR BLD MANUAL: 4 %
ERYTHROCYTE [DISTWIDTH] IN BLOOD BY AUTOMATED COUNT: 13.8 % (ref 10–15)
GFR SERPL CREATININE-BSD FRML MDRD: 80 ML/MIN/1.73M2
GLUCOSE SERPL-MCNC: 90 MG/DL (ref 70–99)
HCT VFR BLD AUTO: 37.8 % (ref 35–47)
HGB BLD-MCNC: 12.5 G/DL (ref 11.7–15.7)
LYMPHOCYTES # BLD MANUAL: 5.3 10E3/UL (ref 0.8–5.3)
LYMPHOCYTES NFR BLD MANUAL: 61 %
MCH RBC QN AUTO: 28.2 PG (ref 26.5–33)
MCHC RBC AUTO-ENTMCNC: 33.1 G/DL (ref 31.5–36.5)
MCV RBC AUTO: 85 FL (ref 78–100)
MONOCYTES # BLD MANUAL: 0.6 10E3/UL (ref 0–1.3)
MONOCYTES NFR BLD MANUAL: 7 %
NEUTROPHILS # BLD MANUAL: 2.3 10E3/UL (ref 1.6–8.3)
NEUTROPHILS NFR BLD MANUAL: 27 %
PHOSPHATE SERPL-MCNC: 4.1 MG/DL (ref 2.5–4.5)
PLAT MORPH BLD: NORMAL
PLATELET # BLD AUTO: 196 10E3/UL (ref 150–450)
POTASSIUM SERPL-SCNC: 4.1 MMOL/L (ref 3.4–5.3)
PROT SERPL-MCNC: 7.4 G/DL (ref 6.4–8.3)
RBC # BLD AUTO: 4.44 10E6/UL (ref 3.8–5.2)
RBC MORPH BLD: NORMAL
SODIUM SERPL-SCNC: 137 MMOL/L (ref 136–145)
TACROLIMUS BLD-MCNC: 2.8 UG/L (ref 5–15)
TME LAST DOSE: ABNORMAL H
TME LAST DOSE: ABNORMAL H
WBC # BLD AUTO: 8.7 10E3/UL (ref 4–11)

## 2023-05-08 PROCEDURE — 36415 COLL VENOUS BLD VENIPUNCTURE: CPT

## 2023-05-08 PROCEDURE — 85027 COMPLETE CBC AUTOMATED: CPT

## 2023-05-08 PROCEDURE — 80053 COMPREHEN METABOLIC PANEL: CPT

## 2023-05-08 PROCEDURE — 80197 ASSAY OF TACROLIMUS: CPT

## 2023-05-08 PROCEDURE — 84100 ASSAY OF PHOSPHORUS: CPT

## 2023-05-08 PROCEDURE — 82248 BILIRUBIN DIRECT: CPT

## 2023-05-08 PROCEDURE — 85007 BL SMEAR W/DIFF WBC COUNT: CPT

## 2023-05-10 ENCOUNTER — TELEPHONE (OUTPATIENT)
Dept: TRANSPLANT | Facility: CLINIC | Age: 58
End: 2023-05-10
Payer: COMMERCIAL

## 2023-05-10 DIAGNOSIS — Z94.4 LIVER TRANSPLANTED (H): ICD-10-CM

## 2023-05-10 DIAGNOSIS — Z94.4 S/P LIVER TRANSPLANT (H): Primary | ICD-10-CM

## 2023-05-10 RX ORDER — TACROLIMUS 0.5 MG/1
0.5 CAPSULE ORAL EVERY 12 HOURS
Qty: 180 CAPSULE | Refills: 3 | Status: SHIPPED | OUTPATIENT
Start: 2023-05-10 | End: 2023-07-21

## 2023-05-10 NOTE — TELEPHONE ENCOUNTER
Instructed pt the following:    Please ask Berta to increase tacrolimus to 1.5 mg po bid.   Dr. Dasilva asks that if we increase tac dose that we check CMV PCR weekly.  Please place order for CMV PCR weekly x 2 mos, then every other week for 2 mos.     Pt understood.

## 2023-05-10 NOTE — TELEPHONE ENCOUNTER
"Tac level on 5/8 was 2.8.  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 Dasilva is asking that we keep this level very low to prevent reactivation of disease.    Berta is currently taking 1 mg po bid.  Dr. Leventhal advise that I increase this dose \"a smidge\".      Please ask Berta to increase tacrolimus to 1.5 mg po bid.   Dr. Dasilva asks that if we increase tac dose that we check CMV PCR weekly.  Please place order for CMV PCR weekly x 2 mos, then every other week for 2 mos.     Message to Vidya and Leventhal.   "

## 2023-05-17 ENCOUNTER — LAB (OUTPATIENT)
Dept: LAB | Facility: CLINIC | Age: 58
End: 2023-05-17
Payer: COMMERCIAL

## 2023-05-17 DIAGNOSIS — Z94.4 S/P LIVER TRANSPLANT (H): ICD-10-CM

## 2023-05-17 PROCEDURE — 36415 COLL VENOUS BLD VENIPUNCTURE: CPT

## 2023-05-18 ENCOUNTER — ANCILLARY PROCEDURE (OUTPATIENT)
Dept: BONE DENSITY | Facility: CLINIC | Age: 58
End: 2023-05-18
Payer: COMMERCIAL

## 2023-05-18 DIAGNOSIS — Z13.820 ENCOUNTER FOR OSTEOPOROSIS SCREENING IN ASYMPTOMATIC POSTMENOPAUSAL PATIENT: ICD-10-CM

## 2023-05-18 DIAGNOSIS — Z78.0 ENCOUNTER FOR OSTEOPOROSIS SCREENING IN ASYMPTOMATIC POSTMENOPAUSAL PATIENT: ICD-10-CM

## 2023-05-18 LAB — CMV DNA SPEC NAA+PROBE-ACNC: NOT DETECTED IU/ML

## 2023-05-18 PROCEDURE — 77080 DXA BONE DENSITY AXIAL: CPT | Performed by: INTERNAL MEDICINE

## 2023-05-21 PROBLEM — K83.09 CHOLANGITIS (H): Status: RESOLVED | Noted: 2018-05-13 | Resolved: 2023-05-21

## 2023-05-22 ENCOUNTER — VIRTUAL VISIT (OUTPATIENT)
Dept: INTERNAL MEDICINE | Facility: CLINIC | Age: 58
End: 2023-05-22
Payer: COMMERCIAL

## 2023-05-22 DIAGNOSIS — I10 ESSENTIAL HYPERTENSION: ICD-10-CM

## 2023-05-22 DIAGNOSIS — Z94.4 S/P LIVER TRANSPLANT (H): Primary | ICD-10-CM

## 2023-05-22 PROCEDURE — 99214 OFFICE O/P EST MOD 30 MIN: CPT | Mod: VID

## 2023-05-22 RX ORDER — LISINOPRIL 10 MG/1
10 TABLET ORAL DAILY
Qty: 90 TABLET | Refills: 3 | Status: SHIPPED | OUTPATIENT
Start: 2023-05-22 | End: 2024-05-21

## 2023-05-22 NOTE — PROGRESS NOTES
Berta is a 57 year old who is being evaluated via a billable video visit.      How would you like to obtain your AVS? MyChart  If the video visit is dropped, the invitation should be resent by: Text to cell phone: [phone]  Will anyone else be joining your video visit? No          Assessment & Plan     (Z94.4) S/P liver transplant (H)  (primary encounter diagnosis)  Comment: Tacrolimus dose recently increased to 1.5mg. She is following with ID still for CMV hx. Feeling good and doesn't have any concern today.    (I10) Essential hypertension  Comment: We discontinued Amlodipine at last OV in March of 2023. Switched her to Lisinopril 10MG. BP readings at home 110-120/65-75. Will continue on this regimen. Refill sent.  Plan: lisinopril (ZESTRIL) 10 MG tablet               CHELSEY Cloud [facility]   Berta is a 57 year old, presenting for the following health issues:  Hypertension         View : No data to display.              History of Present Illness       Hypertension: She presents for follow up of hypertension.  She does check blood pressure  regularly outside of the clinic. Outpatient blood pressures have not been over 140/90. She does not follow a low salt diet.     She eats 2-3 servings of fruits and vegetables daily.She consumes 0 sweetened beverage(s) daily.She exercises with enough effort to increase her heart rate 30 to 60 minutes per day.  She exercises with enough effort to increase her heart rate 5 days per week.   She is taking medications regularly.           Objective           Vitals:  No vitals were obtained today due to virtual visit.    Physical Exam   GENERAL: Healthy, alert and no distress  EYES: Eyes grossly normal to inspection.  No discharge or erythema, or obvious scleral/conjunctival abnormalities.  RESP: No audible wheeze, cough, or visible cyanosis.  No visible retractions or increased work of breathing.    SKIN: Visible skin clear.  No significant rash, abnormal pigmentation or lesions.  NEURO: Cranial nerves grossly intact.  Mentation and speech appropriate for age.  PSYCH: Mentation appears normal, affect normal/bright, judgement and insight intact, normal speech and appearance well-groomed.          Video-Visit Details    Type of service:  Video Visit     Originating Location (pt. Location): Home    Distant Location (provider location):  On-site  Platform used for Video Visit: Patricia

## 2023-05-24 ENCOUNTER — LAB (OUTPATIENT)
Dept: LAB | Facility: CLINIC | Age: 58
End: 2023-05-24
Payer: COMMERCIAL

## 2023-05-24 ENCOUNTER — HOSPITAL ENCOUNTER (OUTPATIENT)
Dept: MAMMOGRAPHY | Facility: CLINIC | Age: 58
Discharge: HOME | End: 2023-05-24
Payer: COMMERCIAL

## 2023-05-24 DIAGNOSIS — Z12.31 VISIT FOR SCREENING MAMMOGRAM: ICD-10-CM

## 2023-05-24 DIAGNOSIS — Z94.4 S/P LIVER TRANSPLANT (H): ICD-10-CM

## 2023-05-24 LAB
TACROLIMUS BLD-MCNC: 4.4 UG/L (ref 5–15)
TME LAST DOSE: ABNORMAL H
TME LAST DOSE: ABNORMAL H

## 2023-05-24 PROCEDURE — 77067 SCR MAMMO BI INCL CAD: CPT

## 2023-05-24 PROCEDURE — 36415 COLL VENOUS BLD VENIPUNCTURE: CPT

## 2023-05-24 PROCEDURE — 80197 ASSAY OF TACROLIMUS: CPT

## 2023-05-25 LAB — CMV DNA SPEC NAA+PROBE-ACNC: NOT DETECTED IU/ML

## 2023-06-07 ENCOUNTER — LAB (OUTPATIENT)
Dept: LAB | Facility: CLINIC | Age: 58
End: 2023-06-07
Payer: COMMERCIAL

## 2023-06-07 DIAGNOSIS — Z94.4 LIVER TRANSPLANTED (H): ICD-10-CM

## 2023-06-07 DIAGNOSIS — Z94.4 S/P LIVER TRANSPLANT (H): ICD-10-CM

## 2023-06-07 PROCEDURE — 36415 COLL VENOUS BLD VENIPUNCTURE: CPT

## 2023-06-07 PROCEDURE — 80197 ASSAY OF TACROLIMUS: CPT

## 2023-06-08 ENCOUNTER — TELEPHONE (OUTPATIENT)
Dept: TRANSPLANT | Facility: CLINIC | Age: 58
End: 2023-06-08
Payer: COMMERCIAL

## 2023-06-08 DIAGNOSIS — B25.9 CYTOMEGALOVIRUS (CMV) VIREMIA (H): Primary | ICD-10-CM

## 2023-06-08 LAB
CMV DNA IU/ML, INSTRUMENT: 309 IU/ML
CMV DNA SPEC NAA+PROBE-LOG#: 2.5 {LOG_COPIES}/ML
TACROLIMUS BLD-MCNC: 3.6 UG/L (ref 5–15)
TME LAST DOSE: ABNORMAL H
TME LAST DOSE: ABNORMAL H

## 2023-06-08 NOTE — TELEPHONE ENCOUNTER
Message from Dr. Dasilva:    From: Forest Dasilva MD  Sent: 6/8/2023   1:51 PM CDT  To: Thomas Michael Leventhal, MD; Vanessa Durant, ADRIANA    I think this viral load is very low and would not require therapy at this time. However, I would check it again in 1 week, if it's not back to undetected or less than 137, then I would say we should probably scale back the TAC again.     Thanks,   EVON     Spoke to Berta.  She is feeling well, will recheck CMV next week. Awaiting tac level.

## 2023-06-14 ENCOUNTER — LAB (OUTPATIENT)
Dept: LAB | Facility: CLINIC | Age: 58
End: 2023-06-14
Payer: COMMERCIAL

## 2023-06-14 DIAGNOSIS — B25.9 CYTOMEGALOVIRUS (CMV) VIREMIA (H): ICD-10-CM

## 2023-06-14 PROCEDURE — 36415 COLL VENOUS BLD VENIPUNCTURE: CPT

## 2023-06-15 ENCOUNTER — TELEPHONE (OUTPATIENT)
Dept: TRANSPLANT | Facility: CLINIC | Age: 58
End: 2023-06-15
Payer: COMMERCIAL

## 2023-06-15 LAB — CMV DNA SPEC NAA+PROBE-ACNC: NOT DETECTED IU/ML

## 2023-06-15 NOTE — TELEPHONE ENCOUNTER
Ok per Leventhal to stop checking CMV too.    Call to Roseline to let her know.   She is really happy about this.

## 2023-06-15 NOTE — TELEPHONE ENCOUNTER
Recent CMV PCR neg.  Per Dr. Dasilva:    Forest Dasilva MD sent to Vanessa Durant RN  Cc: Leventhal, Thomas Michael, MD  Perfect.   I would only check either per your protocol or if symptomatic.     Thanks,   K     Message to Dr. Leventhal to see if wants ordered intermittently

## 2023-06-29 ENCOUNTER — TELEPHONE (OUTPATIENT)
Dept: TRANSPLANT | Facility: CLINIC | Age: 58
End: 2023-06-29
Payer: COMMERCIAL

## 2023-06-29 NOTE — TELEPHONE ENCOUNTER
Patient Call: General  Route to LPN    Reason for call: Pt tested positive to COVID today   is inppt with other problems and was diagnosed also with COVID a few days ago     Call back needed? Yes    Return Call Needed  Same as documented in contacts section  When to return call?: Same day: Route High Priority

## 2023-06-29 NOTE — TELEPHONE ENCOUNTER
Call from Berta.  Her  is in the hospital post stroke, he had cold symptoms, tested pos for covid.  Berta developed scratchy eyes. , sore throat, some slight chest congestion.  No fever, doesn't feel ill. Asked her to let me know if something changes.  Message to Dr. Dasilva and Leventhal.

## 2023-06-30 ENCOUNTER — TELEPHONE (OUTPATIENT)
Dept: TRANSPLANT | Facility: CLINIC | Age: 58
End: 2023-06-30
Payer: COMMERCIAL

## 2023-06-30 NOTE — TELEPHONE ENCOUNTER
"Call to Berta to see how she's feeling today in regards to testing positive to testing positive for COVID yesterday.    She does have a cough, had a fever of 101 last night, now 99.  \"I don't really feel that bad\".  Denies shortness of breath.    Really doesn't want to go in for any sort of treatment.  Asked her to go to ER if she develops temp >101.5, rigors, or any shortness of breath.  She will do.   "

## 2023-07-19 ENCOUNTER — LAB (OUTPATIENT)
Dept: LAB | Facility: CLINIC | Age: 58
End: 2023-07-19
Payer: COMMERCIAL

## 2023-07-19 DIAGNOSIS — Z94.4 LIVER TRANSPLANTED (H): ICD-10-CM

## 2023-07-19 DIAGNOSIS — Z94.4 LIVER REPLACED BY TRANSPLANT (H): ICD-10-CM

## 2023-07-19 LAB
ALBUMIN SERPL BCG-MCNC: 4.4 G/DL (ref 3.5–5.2)
ALP SERPL-CCNC: 63 U/L (ref 35–104)
ALT SERPL W P-5'-P-CCNC: 16 U/L (ref 0–50)
ANION GAP SERPL CALCULATED.3IONS-SCNC: 11 MMOL/L (ref 7–15)
AST SERPL W P-5'-P-CCNC: 24 U/L (ref 0–45)
BILIRUB DIRECT SERPL-MCNC: <0.2 MG/DL (ref 0–0.3)
BILIRUB SERPL-MCNC: 0.4 MG/DL
BUN SERPL-MCNC: 18.7 MG/DL (ref 8–23)
CALCIUM SERPL-MCNC: 9.1 MG/DL (ref 8.6–10)
CHLORIDE SERPL-SCNC: 108 MMOL/L (ref 98–107)
CREAT SERPL-MCNC: 0.84 MG/DL (ref 0.51–0.95)
DEPRECATED HCO3 PLAS-SCNC: 20 MMOL/L (ref 22–29)
ERYTHROCYTE [DISTWIDTH] IN BLOOD BY AUTOMATED COUNT: 13.9 % (ref 10–15)
GFR SERPL CREATININE-BSD FRML MDRD: 80 ML/MIN/1.73M2
GLUCOSE SERPL-MCNC: 86 MG/DL (ref 70–99)
HCT VFR BLD AUTO: 37.8 % (ref 35–47)
HGB BLD-MCNC: 13 G/DL (ref 11.7–15.7)
MCH RBC QN AUTO: 28.5 PG (ref 26.5–33)
MCHC RBC AUTO-ENTMCNC: 34.4 G/DL (ref 31.5–36.5)
MCV RBC AUTO: 83 FL (ref 78–100)
PLATELET # BLD AUTO: 225 10E3/UL (ref 150–450)
POTASSIUM SERPL-SCNC: 4.4 MMOL/L (ref 3.4–5.3)
PROT SERPL-MCNC: 7.5 G/DL (ref 6.4–8.3)
RBC # BLD AUTO: 4.56 10E6/UL (ref 3.8–5.2)
SODIUM SERPL-SCNC: 139 MMOL/L (ref 136–145)
TACROLIMUS BLD-MCNC: 4.9 UG/L (ref 5–15)
TME LAST DOSE: ABNORMAL H
TME LAST DOSE: ABNORMAL H
WBC # BLD AUTO: 8.8 10E3/UL (ref 4–11)

## 2023-07-19 PROCEDURE — 80053 COMPREHEN METABOLIC PANEL: CPT

## 2023-07-19 PROCEDURE — 82248 BILIRUBIN DIRECT: CPT

## 2023-07-19 PROCEDURE — 85027 COMPLETE CBC AUTOMATED: CPT

## 2023-07-19 PROCEDURE — 36415 COLL VENOUS BLD VENIPUNCTURE: CPT

## 2023-07-19 PROCEDURE — 80197 ASSAY OF TACROLIMUS: CPT

## 2023-07-21 DIAGNOSIS — Z94.4 S/P LIVER TRANSPLANT (H): Primary | ICD-10-CM

## 2023-07-21 RX ORDER — TACROLIMUS 0.5 MG/1
CAPSULE ORAL
Qty: 450 CAPSULE | Refills: 3 | Status: SHIPPED | OUTPATIENT
Start: 2023-07-21 | End: 2023-12-12

## 2023-07-21 NOTE — TELEPHONE ENCOUNTER
ISSUE:   Tacrolimus IR level 4.9 on 7/19, goal 3, dose 1.5 mg BID.    PLAN:   Please call patient and confirm this was an accurate 12-hour trough. Verify Tacrolimus IR dose. Confirm no new medications or illness. Confirm no missed doses. If accurate trough and accurate dose, decrease Tacrolimus IR dose to 1 mg in the morning and 1.5 mg in the evening.  and repeat all  labs in a month but repeat tac level next week.  Vanessa Durant RN    OUTCOME:   Spoke with patient, they confirm accurate trough level and current dose 1.5 mg BID. Patient confirmed dose change to 1 mg am, 1.5 mg pm  and to repeat all labs in 1 months and tacro in one week. Orders sent to preferred pharmacy for dose change and lab for repeat labs. Patient voiced understanding of plan.     Anne Marie Miranda LPN

## 2023-07-31 ENCOUNTER — DOCUMENTATION ONLY (OUTPATIENT)
Dept: LAB | Facility: CLINIC | Age: 58
End: 2023-07-31

## 2023-07-31 ENCOUNTER — E-VISIT (OUTPATIENT)
Dept: INTERNAL MEDICINE | Facility: CLINIC | Age: 58
End: 2023-07-31
Payer: COMMERCIAL

## 2023-07-31 DIAGNOSIS — R30.0 DYSURIA: Primary | ICD-10-CM

## 2023-07-31 DIAGNOSIS — I10 ESSENTIAL HYPERTENSION: Primary | ICD-10-CM

## 2023-07-31 PROCEDURE — 99421 OL DIG E/M SVC 5-10 MIN: CPT

## 2023-07-31 NOTE — PROGRESS NOTES
Berta AYE Cruzon has an upcoming lab appointment and is eligible to have due Health Maintenance labs drawn per Health Maintenance Protocol Orders (HMPO) process.    Before it can be drawn, a diagnosis is needed for the following lab: Microalbumin      Please review and enter diagnosis as appropriate.     Alphonso Virk, CMA

## 2023-07-31 NOTE — PATIENT INSTRUCTIONS
Dear Berta Nava,     After reviewing your responses, I would like you to come in for a urine test to make sure we treat you correctly. This urine test is to evaluate you for a possible urinary tract infection, and should be scheduled for today or tomorrow. Schedule a Lab Only appointment here.     Lab appointments are not available at most locations on the weekends. If no Lab Only appointment is available, you should be seen in any of our convenient Walk-in or Urgent Care Centers, which can be found on our website here.     You will receive instructions with your results in SendRR once they are available.     If your symptoms worsen, you develop pain in your back or stomach, develop fevers, or are not improving in 5 days, please contact your primary care provider for an appointment or visit a Walk-in or Urgent Care Center to be seen.     Thanks again for choosing us as your health care partner,     CHELSEY Cloud CNP

## 2023-08-01 ENCOUNTER — LAB (OUTPATIENT)
Dept: LAB | Facility: CLINIC | Age: 58
End: 2023-08-01
Payer: COMMERCIAL

## 2023-08-01 DIAGNOSIS — Z94.4 S/P LIVER TRANSPLANT (H): ICD-10-CM

## 2023-08-01 DIAGNOSIS — N30.00 ACUTE CYSTITIS WITHOUT HEMATURIA: Primary | ICD-10-CM

## 2023-08-01 DIAGNOSIS — R30.0 DYSURIA: ICD-10-CM

## 2023-08-01 DIAGNOSIS — I10 ESSENTIAL HYPERTENSION: ICD-10-CM

## 2023-08-01 DIAGNOSIS — Z94.4 LIVER TRANSPLANTED (H): ICD-10-CM

## 2023-08-01 LAB
ALBUMIN UR-MCNC: 30 MG/DL
APPEARANCE UR: ABNORMAL
BACTERIA #/AREA URNS HPF: ABNORMAL /HPF
BILIRUB UR QL STRIP: NEGATIVE
COLOR UR AUTO: YELLOW
CREAT UR-MCNC: 106 MG/DL
GLUCOSE UR STRIP-MCNC: NEGATIVE MG/DL
HGB UR QL STRIP: ABNORMAL
KETONES UR STRIP-MCNC: NEGATIVE MG/DL
LEUKOCYTE ESTERASE UR QL STRIP: ABNORMAL
MICROALBUMIN UR-MCNC: 121 MG/L
MICROALBUMIN/CREAT UR: 114.15 MG/G CR (ref 0–25)
NITRATE UR QL: POSITIVE
PH UR STRIP: 5.5 [PH] (ref 5–7)
RBC #/AREA URNS AUTO: ABNORMAL /HPF
SP GR UR STRIP: 1.02 (ref 1–1.03)
SQUAMOUS #/AREA URNS AUTO: ABNORMAL /LPF
TACROLIMUS BLD-MCNC: 3.5 UG/L (ref 5–15)
TME LAST DOSE: ABNORMAL H
TME LAST DOSE: ABNORMAL H
UROBILINOGEN UR STRIP-ACNC: 0.2 E.U./DL
WBC #/AREA URNS AUTO: ABNORMAL /HPF

## 2023-08-01 PROCEDURE — 36415 COLL VENOUS BLD VENIPUNCTURE: CPT

## 2023-08-01 PROCEDURE — 80197 ASSAY OF TACROLIMUS: CPT

## 2023-08-01 PROCEDURE — 87086 URINE CULTURE/COLONY COUNT: CPT

## 2023-08-01 PROCEDURE — 81001 URINALYSIS AUTO W/SCOPE: CPT

## 2023-08-01 PROCEDURE — 82043 UR ALBUMIN QUANTITATIVE: CPT

## 2023-08-01 PROCEDURE — 82570 ASSAY OF URINE CREATININE: CPT

## 2023-08-01 PROCEDURE — 87186 SC STD MICRODIL/AGAR DIL: CPT

## 2023-08-02 LAB — BACTERIA UR CULT: ABNORMAL

## 2023-08-03 RX ORDER — NITROFURANTOIN 25; 75 MG/1; MG/1
100 CAPSULE ORAL 2 TIMES DAILY
Qty: 10 CAPSULE | Refills: 0 | Status: SHIPPED | OUTPATIENT
Start: 2023-08-03 | End: 2024-05-04

## 2023-08-09 ENCOUNTER — MYC MEDICAL ADVICE (OUTPATIENT)
Dept: INTERNAL MEDICINE | Facility: CLINIC | Age: 58
End: 2023-08-09
Payer: COMMERCIAL

## 2023-08-09 DIAGNOSIS — N30.00 ACUTE CYSTITIS WITHOUT HEMATURIA: Primary | ICD-10-CM

## 2023-08-10 RX ORDER — SULFAMETHOXAZOLE/TRIMETHOPRIM 800-160 MG
1 TABLET ORAL 2 TIMES DAILY
Qty: 6 TABLET | Refills: 0 | Status: SHIPPED | OUTPATIENT
Start: 2023-08-10 | End: 2024-05-04

## 2023-08-10 NOTE — TELEPHONE ENCOUNTER
I sent antibiotic to her pharmacy. She will take this for 3 days, twice daily. The initial antibiotic may not have covered the infection well enough. So we are trying a different antibiotic

## 2023-08-10 NOTE — TELEPHONE ENCOUNTER
Sent [employer] message to patient.    Ed Flanagan, Triage RN [place]  1:25 PM 8/10/2023

## 2023-09-05 NOTE — PROGRESS NOTES
October 6, 2021    [facility]     Berta Nava is a 56 year old female with a history of hypertension, microscopic colitis, ulcerative colitis, pancreatic mucinous cystadenoma, primary sclerosing cholangitis, cirrhosis who was recently admitted for a liver transplantation which was aborted given wide complex tachycardia.     She was brought into the hospital on August 18 and was undergoing liver transplantation, but ultimately developed either V. tach or wide-complex atrial fibrillation that was unstable.  During operative course she was shocked at least 5 times, and it was a committee decision that the transplant be aborted given her cardiogenic instability.  She underwent a left heart cath and an MRI of the heart which did not demonstrate any overt abnormalities that would have predisposed her to this condition.  She was treated with amiodarone during the hospital course but electrophysiology felt that she could safely discontinue.     Since that hospitalization, she denies any complaints. Feels well. No episodes of palpitations, lightheadedness or dizziness. No chest pain or shortness of breath. She had a father with significant ischemic heart failure requiring transplantation at age 59. He had some arrhythmias, but no other family history of arrhythmias or sudden death. She has since be re-listed for transplant.    PAST MEDICAL HISTORY:  Past Medical History:   Diagnosis Date     Ascites      Biliary cirrhosis (H)      Cholangitis, sclerosing      Cirrhosis of liver with ascites (H) 3/3/2021     Hearing loss of left ear     wears a hearing aide     History of low potassium      Hyperlipidemia      Hypertension      SBP (spontaneous bacterial peritonitis) (H) 4/30/2021     Sjogren's syndrome (H)      Ulcerative colitis (H)      Ulcerative pancolitis (H)        FAMILY HISTORY:  Family History   Problem Relation Age of Onset     Cancer Mother         angiosarcoma      Phone report was given to Diego LÓPEZ Coronary Artery Disease Early Onset Father         MI age 46     Heart Transplant Father      Liver Disease No family hx of      Ulcerative Colitis No family hx of      Crohn's Disease No family hx of        SOCIAL HISTORY:  Social History     Socioeconomic History     Marital status:      Spouse name: Not on file     Number of children: Not on file     Years of education: Not on file     Highest education level: Not on file   Occupational History     Not on file   Tobacco Use     Smoking status: Never Smoker     Smokeless tobacco: Never Used   Vaping Use     Vaping Use: Never used   Substance and Sexual Activity     Alcohol use: No     Comment: Last drink was 2017     Drug use: No     Sexual activity: Not Currently     Partners: Male   Other Topics Concern     Parent/sibling w/ CABG, MI or angioplasty before 65F 55M? Not Asked   Social History Narrative     Not on file     Social Determinants of Health     Financial Resource Strain:      Difficulty of Paying Living Expenses:    Food Insecurity:      Worried About Running Out of Food in the Last Year:      Ran Out of Food in the Last Year:    Transportation Needs:      Lack of Transportation (Medical):      Lack of Transportation (Non-Medical):    Physical Activity:      Days of Exercise per Week:      Minutes of Exercise per Session:    Stress:      Feeling of Stress :    Social Connections:      Frequency of Communication with Friends and Family:      Frequency of Social Gatherings with Friends and Family:      Attends Buddhist Services:      Active Member of Clubs or Organizations:      Attends Club or Organization Meetings:      Marital Status:    Intimate Partner Violence:      Fear of Current or Ex-Partner:      Emotionally Abused:      Physically Abused:      Sexually Abused:        CURRENT MEDICATIONS:  Current Outpatient Medications   Medication Sig Dispense Refill     ciprofloxacin (CIPRO) 500 MG tablet Take 1 tablet (500 mg) by mouth every 24  hours 30 tablet 11     furosemide (LASIX) 20 MG tablet Take 2 tablets (40 mg) by mouth daily 180 tablet 3     lactulose (CEPHULAC) 10 GM packet Take 2 packets (20 g) by mouth daily as needed for constipation 30 packet 0     multivitamin (CENTRUM SILVER) tablet Take 1 tablet by mouth daily       omeprazole (PRILOSEC) 20 MG DR capsule Take 2 capsules (40 mg) by mouth 2 times daily 120 capsule 3     pantoprazole (PROTONIX) 40 MG EC tablet Take 1 tablet (40 mg) by mouth 2 times daily (before meals) 60 tablet 0     polyethylene glycol (MIRALAX) 17 GM/Dose powder Take 17 g by mouth daily (Patient taking differently: Take 17 g by mouth daily as needed ) 510 g 0     protein modular (PROSOURCE TF) LIQD 1 packet by Per Feeding Tube route 2 times daily 60 packet 0     spironolactone (ALDACTONE) 50 MG tablet Take 2 tablets (100 mg) by mouth daily 180 tablet 3     sucralfate (CARAFATE) 1 GM/10ML suspension Take 10 mLs (1 g) by mouth 4 times daily 10 mL 1     ursodiol (ACTIGALL) 300 MG capsule Take 1 capsule (300 mg) by mouth 2 times daily 180 capsule 1       ROS:   Comprehensive ROS negative except HPI    EXAM:  LMP  (LMP Unknown)   General: appears comfortable, alert and articulate  Head: normocephalic, atraumatic  Eyes: Scleral icterus, EOMI  Neck: no adenopathy  Orophyarynx: moist mucosa, no lesions, dentition intact  Heart: regular, S1/S2, no murmur, gallop, rub, no JVD  Lungs: clear, no rales or wheezing  Abdomen: soft, non-tender, bowel sounds present, no hepatosplenomegaly  Extremities: no clubbing, cyanosis or edema  Neurological: normal speech and affect, no gross motor deficits    Labs:  Recent Labs   Lab Test 10/01/21  0632 09/16/21  1436   WBC 13.4* 13.0*   HGB 9.2* 8.8*   HCT 26.9* 26.5*    387     Recent Labs   Lab Test 10/01/21  0632 09/27/21  0758 09/16/21  1436 09/16/21  1436   * 129*   < > 129*   POTASSIUM 3.5  --   --  4.8   CHLORIDE 99  --   --  95   CO2 20  --   --  24   BUN 40*  --   --  45*    CR 0.76 0.77   < > 0.85   GFRESTIMATED 87 85   < > 76    < > = values in this interval not displayed.       EKG  10/7/2021 - NSR, QTc 453    Stress Test:  Interpretation Summary  Dobutamine stress echocardiogram with no inducible ischemia.     Target heart rate achieved. Hypotensive response to dobutamine  No angina symptoms during stress test.  No ECG evidence of ischemia at rest or with dobutamine.  Normal segmental and global left ventricular systolic function at baseline,  LVEF 55-60%.  With peak dobutamine, LVEF increased further to >70% and LV cavity size  decreased appropriately.  No stress induced regional wall motion abnormalities.     Normal aortic root and no significant valvular dysfunction noted on screening  2D and Doppler examination.    Coronary Angiogram:  Diagnostic  Dominance: Right    Left Main   The vessel was visualized by selective angiography. There was 0% vessel disease.   Left Anterior Descending   The vessel was visualized by selective angiography. There was 0% vessel disease.   Left Circumflex   The vessel was visualized by selective angiography. There was 0% vessel disease.   Right Coronary Artery   The vessel was visualized by selective angiography. There was 0% vessel disease.         Cardiac MRI  1. The LV is normal in cavity size and wall thickness. The global systolic function is hyperdynamic. The  LVEF is >70%. There are no regional wall motion abnormalities.     2. The RV is normal in cavity size. The global systolic function is hyperdynamic. The RVEF is >70%.      3. Both atria are normal in size.     4. There is no significant valvular disease.      5. Late gadolinium enhancement imaging shows no MI, fibrosis or infiltrative disease.      6. There is no pericardial effusion or thickening.     7. There is no intracardiac thrombus.     8. A central venous catheter is noted in the superior vena cava. It extends into the right atrium.     CONCLUSIONS: Normal cardiac function  without evidence of fibrosis, aneurysm, or morphological features to  suggest the cause of ventricular tachycardia.       Assessment and Plan: Berta Nava is a 56 year old female with a history of hypertension, microscopic colitis, ulcerative colitis, pancreatic mucinous cystadenoma, primary sclerosing cholangitis, cirrhosis who was recently admitted for a liver transplantation which was aborted given wide complex tachycardia. She is presenting to EP clinic for further evaluation.     #History of Wide Complex Tachycardia  Occurred intra-op prior to liver transplant, requiring cardioversion. Unclear what the underlying rhythm was at the time and unclear what precipitated it. Possibilities include stress from abdominal surgery, electrolyte disturbance, QT prolongation. No evidence of CAD or infiltrative disease on broad workup. Had not had any further episodes of ectopy during that hospitalization. No recurrent symptoms. Was on amiodarone in the hospital which has since been discontinued.     Given that we do not know the underlying rhythm, and that it was potentially provoked in the setting of abdominal surgery, she does not require an ICD for secondary prevention. She also does not require any anti-arrhythmic therapy at this time. No further intervention necessary from an electrophysiology standpoint prior to transplant.     In the gerardo-operative period, recommend the following:  - Avoid QT prolonging medications  - Ensure adequate potassium and magnesium levels    This patient was seen and discussed with Dr. Guan.      Luan Carlos MD  Cardiology Fellow  [phone]

## 2023-09-19 ENCOUNTER — LAB (OUTPATIENT)
Dept: LAB | Facility: CLINIC | Age: 58
End: 2023-09-19
Payer: COMMERCIAL

## 2023-09-19 DIAGNOSIS — Z94.4 LIVER TRANSPLANTED (H): ICD-10-CM

## 2023-09-19 DIAGNOSIS — Z94.4 LIVER REPLACED BY TRANSPLANT (H): ICD-10-CM

## 2023-09-19 LAB
ALBUMIN SERPL BCG-MCNC: 4.3 G/DL (ref 3.5–5.2)
ALP SERPL-CCNC: 60 U/L (ref 35–104)
ALT SERPL W P-5'-P-CCNC: 17 U/L (ref 0–50)
ANION GAP SERPL CALCULATED.3IONS-SCNC: 10 MMOL/L (ref 7–15)
AST SERPL W P-5'-P-CCNC: 24 U/L (ref 0–45)
BILIRUB DIRECT SERPL-MCNC: <0.2 MG/DL (ref 0–0.3)
BILIRUB SERPL-MCNC: 0.6 MG/DL
BUN SERPL-MCNC: 17.7 MG/DL (ref 8–23)
CALCIUM SERPL-MCNC: 9.3 MG/DL (ref 8.8–10.2)
CHLORIDE SERPL-SCNC: 107 MMOL/L (ref 98–107)
CREAT SERPL-MCNC: 0.81 MG/DL (ref 0.51–0.95)
DEPRECATED HCO3 PLAS-SCNC: 22 MMOL/L (ref 22–29)
EGFRCR SERPLBLD CKD-EPI 2021: 83 ML/MIN/1.73M2
ERYTHROCYTE [DISTWIDTH] IN BLOOD BY AUTOMATED COUNT: 13.7 % (ref 10–15)
GLUCOSE SERPL-MCNC: 87 MG/DL (ref 70–99)
HCT VFR BLD AUTO: 38.6 % (ref 35–47)
HGB BLD-MCNC: 13.3 G/DL (ref 11.7–15.7)
MCH RBC QN AUTO: 28.9 PG (ref 26.5–33)
MCHC RBC AUTO-ENTMCNC: 34.5 G/DL (ref 31.5–36.5)
MCV RBC AUTO: 84 FL (ref 78–100)
PLATELET # BLD AUTO: 209 10E3/UL (ref 150–450)
POTASSIUM SERPL-SCNC: 4.5 MMOL/L (ref 3.4–5.3)
PROT SERPL-MCNC: 7.3 G/DL (ref 6.4–8.3)
RBC # BLD AUTO: 4.61 10E6/UL (ref 3.8–5.2)
SODIUM SERPL-SCNC: 139 MMOL/L (ref 136–145)
TACROLIMUS BLD-MCNC: 3.4 UG/L (ref 5–15)
TME LAST DOSE: ABNORMAL H
TME LAST DOSE: ABNORMAL H
WBC # BLD AUTO: 7.9 10E3/UL (ref 4–11)

## 2023-09-19 PROCEDURE — 80053 COMPREHEN METABOLIC PANEL: CPT

## 2023-09-19 PROCEDURE — 36415 COLL VENOUS BLD VENIPUNCTURE: CPT

## 2023-09-19 PROCEDURE — 85027 COMPLETE CBC AUTOMATED: CPT

## 2023-09-19 PROCEDURE — 80197 ASSAY OF TACROLIMUS: CPT

## 2023-09-19 PROCEDURE — 82248 BILIRUBIN DIRECT: CPT

## 2023-10-17 ENCOUNTER — TELEPHONE (OUTPATIENT)
Dept: GASTROENTEROLOGY | Facility: CLINIC | Age: 58
End: 2023-10-17
Payer: COMMERCIAL

## 2023-10-17 NOTE — TELEPHONE ENCOUNTER
Pre assessment completed for upcoming procedure.      Procedure details:    Patient scheduled for Colonoscopy  on 10/31/23.     Arrival time: 0700. Procedure time 0800    Pre op exam needed? N/A    Facility location: [facility]; [address] [place] [address], [address] Floor, [place] [address]    Sedation type: MAC    Indication for procedure: screening, UC, PSC    COVID policy reviewed.    Designated  policy reviewed. Instructed to have someone stay 24 hours post procedure.       Chart review:     Electronic implanted devices? No    Diabetic? No    Medication review:    Anticoagulants? No    NSAIDS? No    Other medication HOLDING recommendations:  N/A      Prep for procedure:     Bowel prep recommendation: Standard Miralax  Due to: standard bowel prep.    Prep instructions sent via [employer]    Reviewed procedure prep instructions.     Patient verbalized understanding and had no questions or concerns at this time.        Jess Da Silva RN  Endoscopy Procedure Pre Assessment RN  [phone] option 4

## 2023-10-30 ENCOUNTER — ANESTHESIA EVENT (OUTPATIENT)
Dept: SURGERY | Facility: AMBULATORY SURGERY CENTER | Age: 58
End: 2023-10-30
Payer: COMMERCIAL

## 2023-10-31 ENCOUNTER — ANESTHESIA (OUTPATIENT)
Dept: SURGERY | Facility: AMBULATORY SURGERY CENTER | Age: 58
End: 2023-10-31
Payer: COMMERCIAL

## 2023-10-31 ENCOUNTER — HOSPITAL ENCOUNTER (OUTPATIENT)
Facility: AMBULATORY SURGERY CENTER | Age: 58
Discharge: HOME | End: 2023-10-31
Attending: INTERNAL MEDICINE | Admitting: INTERNAL MEDICINE
Payer: COMMERCIAL

## 2023-10-31 VITALS
HEIGHT: 65 IN | WEIGHT: 169 LBS | TEMPERATURE: 98 F | HEART RATE: 84 BPM | OXYGEN SATURATION: 96 % | SYSTOLIC BLOOD PRESSURE: 117 MMHG | DIASTOLIC BLOOD PRESSURE: 40 MMHG | BODY MASS INDEX: 28.16 KG/M2 | RESPIRATION RATE: 18 BRPM

## 2023-10-31 VITALS — HEART RATE: 79 BPM

## 2023-10-31 LAB — COLONOSCOPY: NORMAL

## 2023-10-31 PROCEDURE — 88305 TISSUE EXAM BY PATHOLOGIST: CPT | Mod: TC | Performed by: INTERNAL MEDICINE

## 2023-10-31 PROCEDURE — 88305 TISSUE EXAM BY PATHOLOGIST: CPT | Mod: 26 | Performed by: PATHOLOGY

## 2023-10-31 PROCEDURE — 45380 COLONOSCOPY AND BIOPSY: CPT | Mod: 59 | Performed by: INTERNAL MEDICINE

## 2023-10-31 PROCEDURE — 45385 COLONOSCOPY W/LESION REMOVAL: CPT | Performed by: INTERNAL MEDICINE

## 2023-10-31 RX ORDER — FLUMAZENIL 0.1 MG/ML
0.2 INJECTION, SOLUTION INTRAVENOUS
Status: ACTIVE | OUTPATIENT
Start: 2023-10-31 | End: 2023-10-31

## 2023-10-31 RX ORDER — LIDOCAINE HYDROCHLORIDE 20 MG/ML
INJECTION, SOLUTION INFILTRATION; PERINEURAL PRN
Status: DISCONTINUED | OUTPATIENT
Start: 2023-10-31 | End: 2023-10-31

## 2023-10-31 RX ORDER — SODIUM CHLORIDE, SODIUM LACTATE, POTASSIUM CHLORIDE, CALCIUM CHLORIDE 600; 310; 30; 20 MG/100ML; MG/100ML; MG/100ML; MG/100ML
INJECTION, SOLUTION INTRAVENOUS CONTINUOUS
Status: DISCONTINUED | OUTPATIENT
Start: 2023-10-31 | End: 2023-11-01 | Stop reason: HOSPADM

## 2023-10-31 RX ORDER — NALOXONE HYDROCHLORIDE 0.4 MG/ML
0.4 INJECTION, SOLUTION INTRAMUSCULAR; INTRAVENOUS; SUBCUTANEOUS
Status: DISCONTINUED | OUTPATIENT
Start: 2023-10-31 | End: 2023-11-01 | Stop reason: HOSPADM

## 2023-10-31 RX ORDER — ONDANSETRON 4 MG/1
4 TABLET, ORALLY DISINTEGRATING ORAL EVERY 6 HOURS PRN
Status: DISCONTINUED | OUTPATIENT
Start: 2023-10-31 | End: 2023-11-01 | Stop reason: HOSPADM

## 2023-10-31 RX ORDER — ONDANSETRON 2 MG/ML
4 INJECTION INTRAMUSCULAR; INTRAVENOUS
Status: DISCONTINUED | OUTPATIENT
Start: 2023-10-31 | End: 2023-11-01 | Stop reason: HOSPADM

## 2023-10-31 RX ORDER — ONDANSETRON 2 MG/ML
4 INJECTION INTRAMUSCULAR; INTRAVENOUS EVERY 6 HOURS PRN
Status: DISCONTINUED | OUTPATIENT
Start: 2023-10-31 | End: 2023-11-01 | Stop reason: HOSPADM

## 2023-10-31 RX ORDER — NALOXONE HYDROCHLORIDE 0.4 MG/ML
0.2 INJECTION, SOLUTION INTRAMUSCULAR; INTRAVENOUS; SUBCUTANEOUS
Status: DISCONTINUED | OUTPATIENT
Start: 2023-10-31 | End: 2023-11-01 | Stop reason: HOSPADM

## 2023-10-31 RX ORDER — PROPOFOL 10 MG/ML
INJECTION, EMULSION INTRAVENOUS PRN
Status: DISCONTINUED | OUTPATIENT
Start: 2023-10-31 | End: 2023-10-31

## 2023-10-31 RX ORDER — PROCHLORPERAZINE MALEATE 10 MG
10 TABLET ORAL EVERY 6 HOURS PRN
Status: DISCONTINUED | OUTPATIENT
Start: 2023-10-31 | End: 2023-11-01 | Stop reason: HOSPADM

## 2023-10-31 RX ORDER — LIDOCAINE 40 MG/G
CREAM TOPICAL
Status: DISCONTINUED | OUTPATIENT
Start: 2023-10-31 | End: 2023-11-01 | Stop reason: HOSPADM

## 2023-10-31 RX ORDER — PROPOFOL 10 MG/ML
INJECTION, EMULSION INTRAVENOUS CONTINUOUS PRN
Status: DISCONTINUED | OUTPATIENT
Start: 2023-10-31 | End: 2023-10-31

## 2023-10-31 RX ADMIN — SODIUM CHLORIDE, SODIUM LACTATE, POTASSIUM CHLORIDE, CALCIUM CHLORIDE: 600; 310; 30; 20 INJECTION, SOLUTION INTRAVENOUS at 07:53

## 2023-10-31 RX ADMIN — PROPOFOL 30 MG: 10 INJECTION, EMULSION INTRAVENOUS at 08:09

## 2023-10-31 RX ADMIN — PROPOFOL 150 MCG/KG/MIN: 10 INJECTION, EMULSION INTRAVENOUS at 08:06

## 2023-10-31 RX ADMIN — LIDOCAINE HYDROCHLORIDE 40 MG: 20 INJECTION, SOLUTION INFILTRATION; PERINEURAL at 08:06

## 2023-10-31 RX ADMIN — PROPOFOL 30 MG: 10 INJECTION, EMULSION INTRAVENOUS at 08:06

## 2023-10-31 NOTE — ANESTHESIA CARE TRANSFER NOTE
Patient: Berta Nava    Procedure: Procedure(s):  Colonoscopy with biopsy and polypectomy       Diagnosis: Ulcerative pancolitis with other complication (H) [K51.018]  PSC (primary sclerosing cholangitis) (H28) [K83.01]  S/P liver transplant (H) [Z94.4]  Diagnosis Additional Information: No value filed.    Anesthesia Type:   MAC     Note:    Oropharynx: oropharynx clear of all foreign objects and spontaneously breathing  Level of Consciousness: awake  Oxygen Supplementation: room air    Independent Airway: airway patency satisfactory and stable  Dentition: dentition unchanged  Vital Signs Stable: post-procedure vital signs reviewed and stable  Report to RN Given: handoff report given    Comments: Uneventful transport   Report to RN  Exchanging well; color natl  Pt comfortable  Pt responds appropriately to command  IV patent  Lips/teeth/dentition as preop status  Questions answered            Vitals:  Vitals Value Taken Time   /82 0839   Temp 98    Pulse 84    Resp 16    SpO2 99% room air        Electronically Signed By: CHELSEY HALL CRNA  October 31, 2023  8:41 AM

## 2023-10-31 NOTE — H&P
Berta Nava  [id number]  female  58 year old      Reason for procedure/surgery: PSC/UC    Patient Active Problem List   Diagnosis    Hypokalemia    Essential hypertension    Hyperlipidemia    Mixed hearing loss, bilateral    Neoplasm of uncertain behavior of skin    Sicca syndrome (H24)    Ulcerative pancolitis with complication (H)    Primary sclerosing cholangitis (H28)    Acute kidney failure, unspecified (H24)    Immunosuppressed status (H24)    S/P liver transplant (H)    Anemia due to chronic kidney disease    Liver transplant rejection (H)       Past Surgical History:    Past Surgical History:   Procedure Laterality Date    BENCH LIVER N/A 11/18/2021    Procedure: Bench liver;  Surgeon: Ernesto Schmitt MD;  Location: [facility]    COLONOSCOPY N/A 06/08/2022    Procedure: COLONOSCOPY, WITH BIOPSY;  Surgeon: Leventhal, Thomas Michael, MD;  [place] [facility] [place]    COLONOSCOPY N/A 10/31/2022    Procedure: COLONOSCOPY, WITH POLYPECTOMY AND BIOPSY;  Surgeon: Kendrick Crawford MD;  [place] [facility] [place]    COMBINED ESOPHAGOSCOPY, GASTROSCOPY, DUODENOSCOPY (EGD), REMOVE BILIARY STENT N/A 02/09/2022    Procedure: ESOPHAGOGASTRODUODENOSCOPY, WITH BILIARY STENT REMOVAL;  Surgeon: Leventhal, Thomas Michael, MD;  [place] [facility] [place]    [place] CORONARY ANGIOGRAM N/A 08/25/2021    Procedure: Coronary Angiogram with possible intervention;  Surgeon: David Wilhelm MD;  Location:  [facility]ATH LAB    ENDOSCOPIC RETROGRADE CHOLANGIOPANCREATOGRAM N/A 06/25/2021    Procedure: ENDOSCOPIC RETROGRADE CHOLANGIOPANCREATOGRAPHY with ballon sweep of bile ducts for stones, balloon dilation of bile ducts and bile duct stent placement;  Surgeon: Gregory Gabriel MD;  Location:  OR    ENDOSCOPIC RETROGRADE CHOLANGIOPANCREATOGRAM N/A 07/22/2021    Procedure: ENDOSCOPIC RETROGRADE CHOLANGIOPANCREATOGRAPHY STONE REMOVAL, DILATION AND STENT PLACEMENT;  Surgeon: Gregory Gabriel MD;  Location:  OR    ENDOSCOPIC RETROGRADE  CHOLANGIOPANCREATOGRAPHY, EXCHANGE TUBE/STENT N/A 05/05/2021    Procedure: ENDOSCOPIC RETROGRADE CHOLANGIOPANCREATOGRAPHY WITH STENT EXCHANGE, STONE EXTRACTION, AND DILATION;  Surgeon: Gregory Gabriel MD;  Location: [facility]    ENDOSCOPIC RETROGRADE CHOLANGIOPANCREATOGRAPHY, EXCHANGE TUBE/STENT N/A 05/10/2021    Procedure: ENDOSCOPIC RETROGRADE CHOLANGIOPANCREATOGRAPHY with biliary dilation, stone removal, stent exchange;  Surgeon: Gregory Gabriel MD;  Location: [facility]    ENDOSCOPIC RETROGRADE CHOLANGIOPANCREATOGRAPHY, EXCHANGE TUBE/STENT N/A 06/10/2021    Procedure: ENDOSCOPIC RETROGRADE CHOLANGIOPANCREATOGRAPHY, WITH biliary stent exchange, stone removal;  Surgeon: Woodrow Lott MD;  Location: [facility]    ENDOSCOPIC RETROGRADE CHOLANGIOPANCREATOGRAPHY, EXCHANGE TUBE/STENT N/A 08/30/2021    Procedure: ENDOSCOPIC RETROGRADE CHOLANGIOPANCREATOGRAPHY with biliary dilation, stone removal, stent exchange;  Surgeon: Gregory Gabriel MD;  Location: [facility]    ENDOSCOPIC RETROGRADE CHOLANGIOPANCREATOGRAPHY, EXCHANGE TUBE/STENT N/A 10/01/2021    Procedure: ENDOSCOPIC RETROGRADE CHOLANGIOPANCREATOGRAPHY with balloon sweep of bile ducts, bile duct stent exchanged;  Surgeon: Gregory Gabriel MD;  Location: [facility]    ESOPHAGOSCOPY, GASTROSCOPY, DUODENOSCOPY (EGD), COMBINED N/A 09/04/2021    Procedure: ESOPHAGOGASTRODUODENOSCOPY (EGD);  Surgeon: Leventhal, Thomas Michael, MD;  Location:  GI    ESOPHAGOSCOPY, GASTROSCOPY, DUODENOSCOPY (EGD), COMBINED N/A 10/01/2021    Procedure: ESOPHAGOGASTRODUODENOSCOPY (EGD) with varices banding;  Surgeon: Gregory Gabriel MD;  Location: [facility] OR    GYN SURGERY      bilat fallopian tubes and ovaries removed    IR LIVER BIOPSY PERCUTANEOUS  05/17/2022    PICC DOUBLE LUMEN PLACEMENT Right 05/08/2021    42cm (2cm external), Lateral brachial vein    RETURN LIVER TRANSPLANT N/A 11/19/2021    Procedure: CLOSURE, WOUND, ABDOMEN, SECONDARY, ABDOMINAL WASHOUT;  Surgeon: Ernesto Schmitt MD;  Location: [facility] OR     TRANSPLANT  2021    Liver due to PSC    TRANSPLANT LIVER RECIPIENT  DONOR N/A 2021    Procedure: Opening of abdomen, Abdominal Exploration and aborted liver transplant.;  Surgeon: Ernesto Schmitt MD;  Location: [facility]    TRANSPLANT LIVER RECIPIENT  DONOR N/A 2021    Procedure: TRANSPLANT, LIVER, RECIPIENT,  DONOR;  Surgeon: Ernesto Schmitt MD;  Location: [facility]       Past Medical History:   Past Medical History:   Diagnosis Date    Ascites     Biliary cirrhosis (H)     Cholangitis, sclerosing     Cirrhosis of liver with ascites (H) 2021    Hearing loss of left ear     wears a hearing aide    History of blood transfusion     History of low potassium     Hyperlipidemia     Hypertension     Liver transplant rejection (H) 2022    Mild Rejection    SBP (spontaneous bacterial peritonitis) (H) 2021    Sjogren's syndrome (H)     Ulcerative colitis (H)     Ulcerative pancolitis (H)        Social History:   Social History     Tobacco Use    Smoking status: Never    Smokeless tobacco: Never   Substance Use Topics    Alcohol use: No     Comment: Last drink was        Family History:   Family History   Problem Relation Age of Onset    Cancer Mother         angiosarcoma    Hypertension Mother     Hyperlipidemia Mother     Other Cancer Mother     Coronary Artery Disease Early Onset Father         MI age 46    Heart Transplant Father     Coronary Artery Disease Father     Melanoma Sister     Skin Cancer Sister     Liver Disease No family hx of     Ulcerative Colitis No family hx of     Crohn's Disease No family hx of     Glaucoma No family hx of     Macular Degeneration No family hx of        Allergies:   Allergies   Allergen Reactions    Iodinated Contrast Media Hives     PATIENT HAD HIVE REACTION AFTER ADMINISTERING CT CONTRAST DYE.      Contrast Dye        Active Medications:   Current Outpatient Medications   Medication Sig Dispense Refill     "lisinopril (ZESTRIL) 10 MG tablet Take 1 tablet (10 mg) by mouth daily 90 tablet 3    multivitamin (CENTRUM SILVER) tablet Take 1 tablet by mouth daily      nitroFURantoin macrocrystal-monohydrate (MACROBID) 100 MG capsule Take 1 capsule (100 mg) by mouth 2 times daily 10 capsule 0    sulfamethoxazole-trimethoprim (BACTRIM DS) 800-160 MG tablet Take 1 tablet by mouth 2 times daily 6 tablet 0    tacrolimus (GENERIC EQUIVALENT) 0.5 MG capsule Take 2 capsules (1 mg) by mouth every morning AND 3 capsules (1.5 mg) every evening. For a total dose of 1mg am and 1.5mg pm 450 capsule 3    tacrolimus (PROTOPIC) 0.1 % external ointment Apply topically 2 times daily 60 g 1       Systemic Review:   CONSTITUTIONAL: NEGATIVE for fever, chills, change in weight  ENT/MOUTH: NEGATIVE for ear, mouth and throat problems  RESP: NEGATIVE for significant cough or SOB  CV: NEGATIVE for chest pain, palpitations or peripheral edema    Physical Examination:   Vital Signs: /80 (BP Location: Right arm)   Pulse 84   Temp 97.2  F (36.2  C) (Temporal)   Resp 16   Ht 1.651 m (5' 5\")   Wt 76.7 kg (169 lb)   LMP  (LMP Unknown)   SpO2 100%   BMI 28.12 kg/m    GENERAL: healthy, alert and no distress  NECK: no adenopathy, no asymmetry, masses, or scars  RESP: lungs clear to auscultation - no rales, rhonchi or wheezes  CV: regular rate and rhythm, normal S1 S2, no S3 or S4, no murmur, click or rub, no peripheral edema and peripheral pulses strong  ABDOMEN: soft, nontender, no hepatosplenomegaly, no masses and bowel sounds normal  MS: no gross musculoskeletal defects noted, no edema    Plan: Appropriate to proceed as scheduled.      Kendrick Crawford MD  10/31/2023    PCP:  Afshan Penaloza    "

## 2023-11-02 ENCOUNTER — VIRTUAL VISIT (OUTPATIENT)
Dept: GASTROENTEROLOGY | Facility: CLINIC | Age: 58
End: 2023-11-02
Payer: COMMERCIAL

## 2023-11-02 DIAGNOSIS — Z94.4 S/P LIVER TRANSPLANT (H): ICD-10-CM

## 2023-11-02 DIAGNOSIS — K83.01 PSC (PRIMARY SCLEROSING CHOLANGITIS) (H): ICD-10-CM

## 2023-11-02 DIAGNOSIS — K51.019 ULCERATIVE PANCOLITIS WITH COMPLICATION (H): Primary | ICD-10-CM

## 2023-11-02 PROCEDURE — 99213 OFFICE O/P EST LOW 20 MIN: CPT | Mod: VID | Performed by: INTERNAL MEDICINE

## 2023-11-02 NOTE — PROGRESS NOTES
Virtual Visit Details    Type of service:  Video Visit     Originating Location (pt. Location): Home    Distant Location (provider location):  On-site  Platform used for Video Visit: [employer]    Video start: 3:52 PM  Video end: 4:00 PM    BD CLINIC VISIT     CC/REFERRING MD:  Referred Self  REASON FOR CONSULTATION: follow up UC    ASSESSMENT/PLAN    1. Ulcerative colitis:   Current medications: none   Current clinical disease activity: clinical remission   Last endoscopic disease activity: Manzano 0 (some microscopic inflammation left colon and transverse)    Continues to do very well. Has endoscopic healing off therapy for years now. She will let us know if she develops any symptoms. Will continue with yearly colonoscopies for surveillance given PSC    -yearly colonoscopies with virtual chromoendoscopy    2. IBD dysplasia surveillance:   yearly    3. CMV colitis - resolved    4. PSC cirrhosis s/p transplant - continue tacrolimus and follow up with transplant clinic    Return to clinic in 12 months    Thank you for this consultation.  It was a pleasure to participate in the care of this patient; please contact us with any further questions.  I spent a total of 20 minutes during the day of encounter performed chart review, meeting with patient, patient counseling, care coordination, and documentation.      This note was created with voice recognition software, and while reviewed for accuracy, typos may remain.     Kendrick Crawford MD  Inflammatory Bowel Disease Program   Division of Gastroenterology, Hepatology and Nutrition  [facility]        IBD HISTORY  Age at diagnosis: around 2000s  Extent of endoscopic disease: pancolitis likely (microscopic changes  Extent of histologic disease: pancolitis  Prior UC surgeries: none  Prior IBD Medications: formerly on mesalamine - stopped as she was doing well    DRUG MONITORING  TPMT enzyme activity:     6-TGN/6-MMPN levels:    Biologic concentration:          DISEASE ASSESSMENT    Endoscopic assessment: endoscopic healing on  colonoscopy and  colonsocopy  Enterography: none  Fecal calprotectin: none  C diff: none    HPI:   Currently, here to follow up in clinic - had colonoscopy earlier this week. Everything looked great.    Continues to do well with no symptoms.    No EIM.     having health issues - neurologic? Working with neuro and ENT.     She continues to follow with Dr. Leventhal in the liver clinic.    Manzano score:   Stool freq: 0 (baseline stools frequency)  Rectal bleedin (None)  PGA: 0 (normal)  Endoscopy: 0 (normal mucosa)    Extra intestinal manifestations of IBD:  No uveitis/episcleritis  No aphthous ulcers   No arthritis   No erythema nodosum/pyoderma gangrenosum.     sIBDQ:  IBDQ Score Date IBDQ - Total Score  (Higher score better)   10/26/2023   8:44 AM 69   2022   8:41 AM 52   2022   8:25 AM 62          ROS:    Constitutional, HEENT, cardiovascular, pulmonary, GI, , musculoskeletal, neuro, skin, endocrine and psych systems are negative, except as otherwise noted.    PHYSICAL EXAMINATION:  Constitutional: aaox3, cooperative, pleasant, not dyspneic/diaphoretic, no acute distress  Vitals reviewed: LMP  (LMP Unknown)   Wt:   Wt Readings from Last 2 Encounters:   10/31/23 76.7 kg (169 lb)   04/10/23 76.7 kg (169 lb)      Eyes: Sclera anicteric/injected  CV: No edema  Respiratory: Unlabored breathing  Skin: no jaundice  Psych: Normal affect    PERTINENT PAST MEDICAL HISTORY:  Past Medical History:   Diagnosis Date    Ascites     Biliary cirrhosis (H)     Cholangitis, sclerosing (H28)     Cirrhosis of liver with ascites (H) 2021    Hearing loss of left ear     wears a hearing aide    History of blood transfusion     History of low potassium     Hyperlipidemia     Hypertension     Liver transplant rejection (H) 2022    Mild Rejection    SBP (spontaneous bacterial peritonitis) (H) 2021    Sjogren's syndrome (H24)      Ulcerative colitis (H)     Ulcerative pancolitis (H)        PREVIOUS SURGERIES:  Past Surgical History:   Procedure Laterality Date    BENCH LIVER N/A 11/18/2021    Procedure: Bench liver;  Surgeon: Ernesto Schmitt MD;  Location: [facility]    COLONOSCOPY N/A 06/08/2022    Procedure: COLONOSCOPY, WITH BIOPSY;  Surgeon: Leventhal, Thomas Michael, MD;  Location: [facility] OR    COLONOSCOPY N/A 10/31/2022    Procedure: COLONOSCOPY, WITH POLYPECTOMY AND BIOPSY;  Surgeon: Kendrick Crawford MD;  Location: [facility] OR    COLONOSCOPY N/A 10/31/2023    Procedure: Colonoscopy with biopsy and polypectomy;  Surgeon: Kendrick Crawford MD;  [place] [facility] [place]    COMBINED ESOPHAGOSCOPY, GASTROSCOPY, DUODENOSCOPY (EGD), REMOVE BILIARY STENT N/A 02/09/2022    Procedure: ESOPHAGOGASTRODUODENOSCOPY, WITH BILIARY STENT REMOVAL;  Surgeon: Leventhal, Thomas Michael, MD;  [place] [facility] [place] [facility] [place]    [place] CORONARY ANGIOGRAM N/A 08/25/2021    Procedure: Coronary Angiogram with possible intervention;  Surgeon: David Wilhelm MD;  Location:  [facility]ATH LAB    ENDOSCOPIC RETROGRADE CHOLANGIOPANCREATOGRAM N/A 06/25/2021    Procedure: ENDOSCOPIC RETROGRADE CHOLANGIOPANCREATOGRAPHY with ballon sweep of bile ducts for stones, balloon dilation of bile ducts and bile duct stent placement;  Surgeon: Gregory Gabriel MD;  Location:  OR    ENDOSCOPIC RETROGRADE CHOLANGIOPANCREATOGRAM N/A 07/22/2021    Procedure: ENDOSCOPIC RETROGRADE CHOLANGIOPANCREATOGRAPHY STONE REMOVAL, DILATION AND STENT PLACEMENT;  Surgeon: Gregory Gabriel MD;  Location:  OR    ENDOSCOPIC RETROGRADE CHOLANGIOPANCREATOGRAPHY, EXCHANGE TUBE/STENT N/A 05/05/2021    Procedure: ENDOSCOPIC RETROGRADE CHOLANGIOPANCREATOGRAPHY WITH STENT EXCHANGE, STONE EXTRACTION, AND DILATION;  Surgeon: Gregory Gabriel MD;  Location:  OR    ENDOSCOPIC RETROGRADE CHOLANGIOPANCREATOGRAPHY, EXCHANGE TUBE/STENT N/A 05/10/2021    Procedure: ENDOSCOPIC RETROGRADE CHOLANGIOPANCREATOGRAPHY  with biliary dilation, stone removal, stent exchange;  Surgeon: Gregory Gabriel MD;  Location: [facility]    ENDOSCOPIC RETROGRADE CHOLANGIOPANCREATOGRAPHY, EXCHANGE TUBE/STENT N/A 06/10/2021    Procedure: ENDOSCOPIC RETROGRADE CHOLANGIOPANCREATOGRAPHY, WITH biliary stent exchange, stone removal;  Surgeon: Woodrow Lott MD;  Location: [facility]    ENDOSCOPIC RETROGRADE CHOLANGIOPANCREATOGRAPHY, EXCHANGE TUBE/STENT N/A 2021    Procedure: ENDOSCOPIC RETROGRADE CHOLANGIOPANCREATOGRAPHY with biliary dilation, stone removal, stent exchange;  Surgeon: Gregory Gabriel MD;  Location: [facility]    ENDOSCOPIC RETROGRADE CHOLANGIOPANCREATOGRAPHY, EXCHANGE TUBE/STENT N/A 10/01/2021    Procedure: ENDOSCOPIC RETROGRADE CHOLANGIOPANCREATOGRAPHY with balloon sweep of bile ducts, bile duct stent exchanged;  Surgeon: Gregory Gabriel MD;  Location: [facility]    ESOPHAGOSCOPY, GASTROSCOPY, DUODENOSCOPY (EGD), COMBINED N/A 2021    Procedure: ESOPHAGOGASTRODUODENOSCOPY (EGD);  Surgeon: Leventhal, Thomas Michael, MD;  Location: [facility]    ESOPHAGOSCOPY, GASTROSCOPY, DUODENOSCOPY (EGD), COMBINED N/A 10/01/2021    Procedure: ESOPHAGOGASTRODUODENOSCOPY (EGD) with varices banding;  Surgeon: Gregory Gabriel MD;  Location: [facility]    GYN SURGERY      bilat fallopian tubes and ovaries removed    IR LIVER BIOPSY PERCUTANEOUS  2022    PICC DOUBLE LUMEN PLACEMENT Right 2021    42cm (2cm external), Lateral brachial vein    RETURN LIVER TRANSPLANT N/A 2021    Procedure: CLOSURE, WOUND, ABDOMEN, SECONDARY, ABDOMINAL WASHOUT;  Surgeon: Ernesto Schmitt MD;  Location: [facility]    TRANSPLANT  2021    Liver due to PSC    TRANSPLANT LIVER RECIPIENT  DONOR N/A 2021    Procedure: Opening of abdomen, Abdominal Exploration and aborted liver transplant.;  Surgeon: Ernesto Schmitt MD;  Location: [facility]    TRANSPLANT LIVER RECIPIENT  DONOR N/A 2021    Procedure: TRANSPLANT, LIVER, RECIPIENT,  DONOR;   Surgeon: Ernesto Schmitt MD;  Location: [facility]       ALLERGIES:     Allergies   Allergen Reactions    Iodinated Contrast Media Hives     PATIENT HAD HIVE REACTION AFTER ADMINISTERING CT CONTRAST DYE.      Contrast Dye        PERTINENT MEDICATIONS:    Current Outpatient Medications:     lisinopril (ZESTRIL) 10 MG tablet, Take 1 tablet (10 mg) by mouth daily, Disp: 90 tablet, Rfl: 3    multivitamin (CENTRUM SILVER) tablet, Take 1 tablet by mouth daily, Disp: , Rfl:     nitroFURantoin macrocrystal-monohydrate (MACROBID) 100 MG capsule, Take 1 capsule (100 mg) by mouth 2 times daily, Disp: 10 capsule, Rfl: 0    sulfamethoxazole-trimethoprim (BACTRIM DS) 800-160 MG tablet, Take 1 tablet by mouth 2 times daily, Disp: 6 tablet, Rfl: 0    tacrolimus (GENERIC EQUIVALENT) 0.5 MG capsule, Take 2 capsules (1 mg) by mouth every morning AND 3 capsules (1.5 mg) every evening. For a total dose of 1mg am and 1.5mg pm, Disp: 450 capsule, Rfl: 3    tacrolimus (PROTOPIC) 0.1 % external ointment, Apply topically 2 times daily, Disp: 60 g, Rfl: 1    SOCIAL HISTORY:  Social History     Socioeconomic History    Marital status:      Spouse name: Not on file    Number of children: Not on file    Years of education: Not on file    Highest education level: Not on file   Occupational History    Not on file   Tobacco Use    Smoking status: Never    Smokeless tobacco: Never   Vaping Use    Vaping Use: Never used   Substance and Sexual Activity    Alcohol use: No     Comment: Last drink was 2017    Drug use: No    Sexual activity: Not Currently     Partners: Male     Birth control/protection: Post-menopausal   Other Topics Concern    Parent/sibling w/ CABG, MI or angioplasty before 65F 55M? Yes     Comment: Father had heart attack and received heart transplant   Social History Narrative    Not on file     Social Determinants of Health     Financial Resource Strain: Not on file   Food Insecurity: Not on file   Transportation Needs: Not  on file   Physical Activity: Not on file   Stress: Not on file   Social Connections: Not on file   Interpersonal Safety: Not on file   Housing Stability: Not on file       FAMILY HISTORY:  Family History   Problem Relation Age of Onset    Cancer Mother         angiosarcoma    Hypertension Mother     Hyperlipidemia Mother     Other Cancer Mother     Coronary Artery Disease Early Onset Father         MI age 46    Heart Transplant Father     Coronary Artery Disease Father     Melanoma Sister     Skin Cancer Sister     Liver Disease No family hx of     Ulcerative Colitis No family hx of     Crohn's Disease No family hx of     Glaucoma No family hx of     Macular Degeneration No family hx of        Past/family/social history reviewed and no changes

## 2023-11-02 NOTE — LETTER
11/2/2023         RE: Berta Nava  [address] [facility] [address]        Dear Colleague,    Thank you for referring your patient, Berta Nava, to the [facility]STR[facility]ENTEROLOG[facility] [place]. Please see a copy of my visit note below.    Virtual Visit Details    Type of service:  Video Visit     Originating Location (pt. Location): Home    Distant Location (provider location):  On-site  Platform used for Video Visit: NGI    Video start: 3:52 PM  Video end: 4:00 PM    BD CLINIC VISIT     CC/REFERRING MD:  Referred Self  REASON FOR CONSULTATION: follow up UC    ASSESSMENT/PLAN    1. Ulcerative colitis:   Current medications: none   Current clinical disease activity: clinical remission   Last endoscopic disease activity: Manzano 0 (some microscopic inflammation left colon and transverse)    Continues to do very well. Has endoscopic healing off therapy for years now. She will let us know if she develops any symptoms. Will continue with yearly colonoscopies for surveillance given PSC    -yearly colonoscopies with virtual chromoendoscopy    2. IBD dysplasia surveillance:   yearly    3. CMV colitis - resolved    4. PSC cirrhosis s/p transplant - continue tacrolimus and follow up with transplant clinic    Return to clinic in 12 months    Thank you for this consultation.  It was a pleasure to participate in the care of this patient; please contact us with any further questions.  I spent a total of 20 minutes during the day of encounter performed chart review, meeting with patient, patient counseling, care coordination, and documentation.      This note was created with voice recognition software, and while reviewed for accuracy, typos may remain.     Kendrick Crawford MD  Inflammatory Bowel Disease Program   Division of Gastroenterology, Hepatology and Nutrition  [facility]        IBD HISTORY  Age at diagnosis: around 2000s  Extent of endoscopic disease: pancolitis  likely (microscopic changes  Extent of histologic disease: pancolitis  Prior UC surgeries: none  Prior IBD Medications: formerly on mesalamine - stopped as she was doing well    DRUG MONITORING  TPMT enzyme activity:     6-TGN/6-MMPN levels:    Biologic concentration:         DISEASE ASSESSMENT    Endoscopic assessment: endoscopic healing on  colonoscopy and  colonsocopy  Enterography: none  Fecal calprotectin: none  C diff: none    HPI:   Currently, here to follow up in clinic - had colonoscopy earlier this week. Everything looked great.    Continues to do well with no symptoms.    No EIM.     having health issues - neurologic? Working with neuro and ENT.     She continues to follow with Dr. Leventhal in the liver clinic.    Manzano score:   Stool freq: 0 (baseline stools frequency)  Rectal bleedin (None)  PGA: 0 (normal)  Endoscopy: 0 (normal mucosa)    Extra intestinal manifestations of IBD:  No uveitis/episcleritis  No aphthous ulcers   No arthritis   No erythema nodosum/pyoderma gangrenosum.     sIBDQ:  IBDQ Score Date IBDQ - Total Score  (Higher score better)   10/26/2023   8:44 AM 69   2022   8:41 AM 52   2022   8:25 AM 62          ROS:    Constitutional, HEENT, cardiovascular, pulmonary, GI, , musculoskeletal, neuro, skin, endocrine and psych systems are negative, except as otherwise noted.    PHYSICAL EXAMINATION:  Constitutional: aaox3, cooperative, pleasant, not dyspneic/diaphoretic, no acute distress  Vitals reviewed: LMP  (LMP Unknown)   Wt:   Wt Readings from Last 2 Encounters:   10/31/23 76.7 kg (169 lb)   04/10/23 76.7 kg (169 lb)      Eyes: Sclera anicteric/injected  CV: No edema  Respiratory: Unlabored breathing  Skin: no jaundice  Psych: Normal affect    PERTINENT PAST MEDICAL HISTORY:  Past Medical History:   Diagnosis Date    Ascites     Biliary cirrhosis (H)     Cholangitis, sclerosing (H28)     Cirrhosis of liver with ascites (H) 2021    Hearing loss of left  ear     wears a hearing aide    History of blood transfusion     History of low potassium     Hyperlipidemia     Hypertension     Liver transplant rejection (H) 05/17/2022    Mild Rejection    SBP (spontaneous bacterial peritonitis) (H) 04/30/2021    Sjogren's syndrome (H24)     Ulcerative colitis (H)     Ulcerative pancolitis (H)        PREVIOUS SURGERIES:  Past Surgical History:   Procedure Laterality Date    BENCH LIVER N/A 11/18/2021    Procedure: Bench liver;  Surgeon: Ernesto Schmitt MD;  [facility]: [facility]    COLONOSCOPY N/A 06/08/2022    Procedure: COLONOSCOPY, WITH BIOPSY;  Surgeon: Leventhal, Thomas Michael, MD;  Location: [facility] OR    COLONOSCOPY N/A 10/31/2022    Procedure: COLONOSCOPY, WITH POLYPECTOMY AND BIOPSY;  Surgeon: Kendrick Crawford MD;  Location: [facility] OR    COLONOSCOPY N/A 10/31/2023    Procedure: Colonoscopy with biopsy and polypectomy;  Surgeon: Kendrick Crawford MD;  Location: [facility] OR    COMBINED ESOPHAGOSCOPY, GASTROSCOPY, DUODENOSCOPY (EGD), REMOVE BILIARY STENT N/A 02/09/2022    Procedure: ESOPHAGOGASTRODUODENOSCOPY, WITH BILIARY STENT REMOVAL;  Surgeon: Leventhal, Thomas Michael, MD;  Location: [facility] OR    CV CORONARY ANGIOGRAM N/A 08/25/2021    Procedure: Coronary Angiogram with possible intervention;  Surgeon: David Wilhelm MD;  Location:  [facility]ARD[facility] CATH LAB    ENDOSCOPIC RETROGRADE CHOLANGIOPANCREATOGRAM N/A 06/25/2021    Procedure: ENDOSCOPIC RETROGRADE CHOLANGIOPANCREATOGRAPHY with ballon sweep of bile ducts for stones, balloon dilation of bile ducts and bile duct stent placement;  Surgeon: Gregory Gabriel MD;  Location:  OR    ENDOSCOPIC RETROGRADE CHOLANGIOPANCREATOGRAM N/A 07/22/2021    Procedure: ENDOSCOPIC RETROGRADE CHOLANGIOPANCREATOGRAPHY STONE REMOVAL, DILATION AND STENT PLACEMENT;  Surgeon: Gregory Gabriel MD;  Location:  OR    ENDOSCOPIC RETROGRADE CHOLANGIOPANCREATOGRAPHY, EXCHANGE TUBE/STENT N/A 05/05/2021    Procedure: ENDOSCOPIC  RETROGRADE CHOLANGIOPANCREATOGRAPHY WITH STENT EXCHANGE, STONE EXTRACTION, AND DILATION;  Surgeon: Gregory Gabriel MD;  [facility]: [facility]    ENDOSCOPIC RETROGRADE CHOLANGIOPANCREATOGRAPHY, EXCHANGE TUBE/STENT N/A 05/10/2021    Procedure: ENDOSCOPIC RETROGRADE CHOLANGIOPANCREATOGRAPHY with biliary dilation, stone removal, stent exchange;  Surgeon: Gregory Gabriel MD;  Location: [facility]    ENDOSCOPIC RETROGRADE CHOLANGIOPANCREATOGRAPHY, EXCHANGE TUBE/STENT N/A 06/10/2021    Procedure: ENDOSCOPIC RETROGRADE CHOLANGIOPANCREATOGRAPHY, WITH biliary stent exchange, stone removal;  Surgeon: Woodrow Lott MD;  Location: [facility]    ENDOSCOPIC RETROGRADE CHOLANGIOPANCREATOGRAPHY, EXCHANGE TUBE/STENT N/A 2021    Procedure: ENDOSCOPIC RETROGRADE CHOLANGIOPANCREATOGRAPHY with biliary dilation, stone removal, stent exchange;  Surgeon: Gregory Gabriel MD;  Location: [facility]    ENDOSCOPIC RETROGRADE CHOLANGIOPANCREATOGRAPHY, EXCHANGE TUBE/STENT N/A 10/01/2021    Procedure: ENDOSCOPIC RETROGRADE CHOLANGIOPANCREATOGRAPHY with balloon sweep of bile ducts, bile duct stent exchanged;  Surgeon: Gregory Gabriel MD;  Location: [facility]    ESOPHAGOSCOPY, GASTROSCOPY, DUODENOSCOPY (EGD), COMBINED N/A 2021    Procedure: ESOPHAGOGASTRODUODENOSCOPY (EGD);  Surgeon: Leventhal, Thomas Michael, MD;  Location: [facility]    ESOPHAGOSCOPY, GASTROSCOPY, DUODENOSCOPY (EGD), COMBINED N/A 10/01/2021    Procedure: ESOPHAGOGASTRODUODENOSCOPY (EGD) with varices banding;  Surgeon: Gregory Gabriel MD;  Location: [facility]    GYN SURGERY      bilat fallopian tubes and ovaries removed    IR LIVER BIOPSY PERCUTANEOUS  2022    PICC DOUBLE LUMEN PLACEMENT Right 2021    42cm (2cm external), Lateral brachial vein    RETURN LIVER TRANSPLANT N/A 2021    Procedure: CLOSURE, WOUND, ABDOMEN, SECONDARY, ABDOMINAL WASHOUT;  Surgeon: Ernesto Schmitt MD;  Location: [facility]    TRANSPLANT  2021    Liver due to PSC    TRANSPLANT LIVER RECIPIENT   DONOR N/A 2021    Procedure: Opening of abdomen, Abdominal Exploration and aborted liver transplant.;  Surgeon: Ernesto Schmitt MD;  Location: [facility]    TRANSPLANT LIVER RECIPIENT  DONOR N/A 2021    Procedure: TRANSPLANT, LIVER, RECIPIENT,  DONOR;  Surgeon: Ernesto Schmitt MD;  Location: [facility] OR       ALLERGIES:     Allergies   Allergen Reactions    Iodinated Contrast Media Hives     PATIENT HAD HIVE REACTION AFTER ADMINISTERING CT CONTRAST DYE.      Contrast Dye        PERTINENT MEDICATIONS:    Current Outpatient Medications:     lisinopril (ZESTRIL) 10 MG tablet, Take 1 tablet (10 mg) by mouth daily, Disp: 90 tablet, Rfl: 3    multivitamin (CENTRUM SILVER) tablet, Take 1 tablet by mouth daily, Disp: , Rfl:     nitroFURantoin macrocrystal-monohydrate (MACROBID) 100 MG capsule, Take 1 capsule (100 mg) by mouth 2 times daily, Disp: 10 capsule, Rfl: 0    sulfamethoxazole-trimethoprim (BACTRIM DS) 800-160 MG tablet, Take 1 tablet by mouth 2 times daily, Disp: 6 tablet, Rfl: 0    tacrolimus (GENERIC EQUIVALENT) 0.5 MG capsule, Take 2 capsules (1 mg) by mouth every morning AND 3 capsules (1.5 mg) every evening. For a total dose of 1mg am and 1.5mg pm, Disp: 450 capsule, Rfl: 3    tacrolimus (PROTOPIC) 0.1 % external ointment, Apply topically 2 times daily, Disp: 60 g, Rfl: 1    SOCIAL HISTORY:  Social History     Socioeconomic History    Marital status:      Spouse name: Not on file    Number of children: Not on file    Years of education: Not on file    Highest education level: Not on file   Occupational History    Not on file   Tobacco Use    Smoking status: Never    Smokeless tobacco: Never   Vaping Use    Vaping Use: Never used   Substance and Sexual Activity    Alcohol use: No     Comment: Last drink was 2017    Drug use: No    Sexual activity: Not Currently     Partners: Male     Birth control/protection: Post-menopausal   Other Topics Concern    Parent/sibling w/ CABG,  MI or angioplasty before 65F 55M? Yes     Comment: Father had heart attack and received heart transplant   Social History Narrative    Not on file     Social Determinants of Health     Financial Resource Strain: Not on file   Food Insecurity: Not on file   Transportation Needs: Not on file   Physical Activity: Not on file   Stress: Not on file   Social Connections: Not on file   Interpersonal Safety: Not on file   Housing Stability: Not on file       FAMILY HISTORY:  Family History   Problem Relation Age of Onset    Cancer Mother         angiosarcoma    Hypertension Mother     Hyperlipidemia Mother     Other Cancer Mother     Coronary Artery Disease Early Onset Father         MI age 46    Heart Transplant Father     Coronary Artery Disease Father     Melanoma Sister     Skin Cancer Sister     Liver Disease No family hx of     Ulcerative Colitis No family hx of     Crohn's Disease No family hx of     Glaucoma No family hx of     Macular Degeneration No family hx of        Past/family/social history reviewed and no changes        Again, thank you for allowing me to participate in the care of your patient.      Sincerely,    Kendrick Crawford MD

## 2023-11-02 NOTE — NURSING NOTE
Is the patient currently in the state of [place]? YES    Visit mode:VIDEO    If the visit is dropped, the patient can be reconnected by: VIDEO VISIT: Text to cell phone:   Telephone Information:   Mobile [phone]       Will anyone else be joining the visit? NO  (If patient encounters technical issues they should call [phone]    How would you like to obtain your AVS? MyChart    Are changes needed to the allergy or medication list? No    Reason for visit: RECHECK    Gaudencio TINSLEY

## 2023-11-02 NOTE — PATIENT INSTRUCTIONS
It was great to see you for your scope this week and for your visit. Things are looking great.    We do not need to do any colitis dedicated meds at this time    Please follow up with Dr. Leventhal    I recommend a repeat colonoscopy in 1 year    Follow up in clinic in 1 year

## 2023-11-14 NOTE — TELEPHONE ENCOUNTER
Instructed pt of the need to increase prednisone to 5mg daily indefinitely. Writer sent new script.    Full Thickness Lip Wedge Repair (Flap) Text: Given the location of the defect and the proximity to free margins a full thickness wedge repair was deemed most appropriate. Using a sterile surgical marker, the appropriate repair was drawn incorporating the defect and placing the expected incisions perpendicular to the vermilion border.  The vermilion border was also meticulously outlined to ensure appropriate reapproximation during the repair.  The area thus outlined was incised through and through with a #15 scalpel blade.  The muscularis and dermis were reaproximated with deep sutures following hemostasis. Care was taken to realign the vermilion border before proceeding with the superficial closure.  Once the vermilion was realigned the superfical and mucosal closure was finished.

## 2023-12-11 ENCOUNTER — OFFICE VISIT (OUTPATIENT)
Dept: GASTROENTEROLOGY | Facility: CLINIC | Age: 58
End: 2023-12-11
Attending: INTERNAL MEDICINE
Payer: COMMERCIAL

## 2023-12-11 ENCOUNTER — LAB (OUTPATIENT)
Dept: LAB | Facility: CLINIC | Age: 58
End: 2023-12-11
Payer: COMMERCIAL

## 2023-12-11 VITALS
HEART RATE: 85 BPM | WEIGHT: 170 LBS | BODY MASS INDEX: 27.32 KG/M2 | HEIGHT: 66 IN | OXYGEN SATURATION: 99 % | SYSTOLIC BLOOD PRESSURE: 148 MMHG | DIASTOLIC BLOOD PRESSURE: 94 MMHG

## 2023-12-11 DIAGNOSIS — K51.019 ULCERATIVE PANCOLITIS WITH COMPLICATION (H): Primary | ICD-10-CM

## 2023-12-11 DIAGNOSIS — Z94.4 S/P LIVER TRANSPLANT (H): ICD-10-CM

## 2023-12-11 DIAGNOSIS — Z48.298 CARE AFTER ORGAN TRANSPLANT: ICD-10-CM

## 2023-12-11 DIAGNOSIS — Z94.4 LIVER REPLACED BY TRANSPLANT (H): ICD-10-CM

## 2023-12-11 DIAGNOSIS — Z94.4 LIVER TRANSPLANTED (H): ICD-10-CM

## 2023-12-11 DIAGNOSIS — D84.9 IMMUNOSUPPRESSED STATUS (H): ICD-10-CM

## 2023-12-11 LAB
ALBUMIN SERPL BCG-MCNC: 4.4 G/DL (ref 3.5–5.2)
ALP SERPL-CCNC: 61 U/L (ref 40–150)
ALT SERPL W P-5'-P-CCNC: 8 U/L (ref 0–50)
ANION GAP SERPL CALCULATED.3IONS-SCNC: 9 MMOL/L (ref 7–15)
AST SERPL W P-5'-P-CCNC: 16 U/L (ref 0–45)
BILIRUB DIRECT SERPL-MCNC: <0.2 MG/DL (ref 0–0.3)
BILIRUB SERPL-MCNC: 0.6 MG/DL
BUN SERPL-MCNC: 17.9 MG/DL (ref 8–23)
CALCIUM SERPL-MCNC: 9.5 MG/DL (ref 8.8–10.2)
CHLORIDE SERPL-SCNC: 104 MMOL/L (ref 98–107)
CREAT SERPL-MCNC: 0.88 MG/DL (ref 0.51–0.95)
DEPRECATED HCO3 PLAS-SCNC: 23 MMOL/L (ref 22–29)
EGFRCR SERPLBLD CKD-EPI 2021: 75 ML/MIN/1.73M2
ERYTHROCYTE [DISTWIDTH] IN BLOOD BY AUTOMATED COUNT: 13.7 % (ref 10–15)
GLUCOSE SERPL-MCNC: 95 MG/DL (ref 70–99)
HCT VFR BLD AUTO: 40.7 % (ref 35–47)
HGB BLD-MCNC: 13.9 G/DL (ref 11.7–15.7)
MCH RBC QN AUTO: 28.3 PG (ref 26.5–33)
MCHC RBC AUTO-ENTMCNC: 34.2 G/DL (ref 31.5–36.5)
MCV RBC AUTO: 83 FL (ref 78–100)
PLATELET # BLD AUTO: 212 10E3/UL (ref 150–450)
POTASSIUM SERPL-SCNC: 3.9 MMOL/L (ref 3.4–5.3)
PROT SERPL-MCNC: 7.7 G/DL (ref 6.4–8.3)
RBC # BLD AUTO: 4.92 10E6/UL (ref 3.8–5.2)
SODIUM SERPL-SCNC: 136 MMOL/L (ref 135–145)
TACROLIMUS BLD-MCNC: 3 UG/L (ref 5–15)
TME LAST DOSE: ABNORMAL H
TME LAST DOSE: ABNORMAL H
WBC # BLD AUTO: 7.6 10E3/UL (ref 4–11)

## 2023-12-11 PROCEDURE — 80053 COMPREHEN METABOLIC PANEL: CPT | Performed by: PATHOLOGY

## 2023-12-11 PROCEDURE — 99000 SPECIMEN HANDLING OFFICE-LAB: CPT | Performed by: PATHOLOGY

## 2023-12-11 PROCEDURE — 36415 COLL VENOUS BLD VENIPUNCTURE: CPT | Performed by: PATHOLOGY

## 2023-12-11 PROCEDURE — 99214 OFFICE O/P EST MOD 30 MIN: CPT | Performed by: INTERNAL MEDICINE

## 2023-12-11 PROCEDURE — 99213 OFFICE O/P EST LOW 20 MIN: CPT | Performed by: INTERNAL MEDICINE

## 2023-12-11 PROCEDURE — 80197 ASSAY OF TACROLIMUS: CPT | Performed by: INTERNAL MEDICINE

## 2023-12-11 PROCEDURE — 85027 COMPLETE CBC AUTOMATED: CPT | Performed by: PATHOLOGY

## 2023-12-11 PROCEDURE — 82248 BILIRUBIN DIRECT: CPT | Performed by: PATHOLOGY

## 2023-12-11 ASSESSMENT — PAIN SCALES - GENERAL: PAINLEVEL: NO PAIN (0)

## 2023-12-11 NOTE — LETTER
2023         RE: Berta Nava  [address]  [facility] [address]        Dear Colleague,    Thank you for referring your patient, Berta Nava, to the [facility] [place]. Please see a copy of my visit note below.    Date of Encounter: 2023     Subjective:          Berta Nava is a 58 year old female presenting for follow up with history of liver transplantation    History of Present Illness   Berta Nava is a 58 year old female with past medical history of hypertension, microscopic colitis, ulcerative colitis, pancreatic mucinous cystadenoma, primary sclerosing cholangitis who presents in follow up s/p  donor liver transplant on 2021.  Also with a history of tissue-invasive CMV now presenting in follow up.    Since last seen she has been doing very well.  Notes that she has not had any hospitalizations or admissions in the past year.  She continues to take her medications as prescribed, without any challenges.    Notes that she has been on some weight and has been less active than previous, she has been dealing with multiple issues surrounding her 's current health.  She notes that she has several avenues that she can take up to further increase her exercise levels.    In the past year she has been seen by dermatology and she has follow-up in early .  She did undergo DEXA scan in May which demonstrated mildly reduced bone density but no real overt osteopenia nor osteoporosis.    Noted that recent CMV levels have been negative, and she is no longer on any therapy          Surgical Course  - OLT date: 2021  - etiology: PSC  - donor: type DBD  - CMV status D positive/R negative  - operation: Surgical technique caval replacement, biliary anastomosis: Duct to duct  - CiT 6 hrs 22 min, WiT 51 min  - Episodes of Rejection: : 2-3/9 ACR  - Biliary complications: none; stent removed 2022  - Other  complications of immediate post-operative course: tissue invasive CMV 2022    Labs and Diagnostic tests:  CBC w/Diff    Lab Results   Component Value Date/Time    WBC 7.6 2023 09:14 AM    WBC 15.2 (H) 2021 09:09 AM    RBC 4.92 2023 09:14 AM    RBC 3.57 (L) 2021 09:09 AM    HGB 13.9 2023 09:14 AM    HGB 10.7 (L) 2021 09:09 AM    HCT 40.7 2023 09:14 AM    HCT 32.9 (L) 2021 09:09 AM    MCV 83 2023 09:14 AM    MCV 92 2021 09:09 AM    MCH 28.3 2023 09:14 AM    MCH 30.0 2021 09:09 AM    MCHC 34.2 2023 09:14 AM    MCHC 32.5 2021 09:09 AM    RDW 13.7 2023 09:14 AM    RDW 16.4 (H) 2021 09:09 AM         Comprehensive Metabolic Profile    Lab Results   Component Value Date/Time     2023 09:14 AM     (L) 2021 09:09 AM    CO2 23 2023 09:14 AM    CO2 24 2022 07:40 AM    CO2 21 2021 09:09 AM    BUN 17.9 2023 09:14 AM    BUN 18 2022 07:40 AM    BUN 23 2021 09:09 AM    CR 0.88 2023 09:14 AM    CR 0.69 2021 09:09 AM    Lab Results   Component Value Date/Time    ALBUMIN 4.4 2023 09:14 AM    ALBUMIN 3.8 2022 07:40 AM    ALBUMIN 2.8 (L) 2021 09:09 AM    ALKPHOS 61 2023 09:14 AM    ALKPHOS 386 (H) 2021 09:09 AM    AST 16 2023 09:14 AM    AST 58 (H) 2021 09:09 AM    ALT 8 2023 09:14 AM    ALT 32 2021 09:09 AM          Assessment and Plan:    S/P Liver Transplantation:   -She is status post  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  -Labs per protocol  - Excellent allograft function at this time  -We will continue with labs per protocol; every 3 month    Immunosuppression:   - On TAC  BID with goal trough 3-5  - Will plan to check immunosuppression trough levels per protocol to assess for adequate target dosing and will assess CBC and kidney function for toxicity of  medications    Kidney Health:   -No acute issues at this time    Hypertension:  -She is currently on lisinopril as an antihypertensive, we recommended she continue to follow with her primary for aggressive management    CMV: tissue invasive  -Treated in 2021/2022  - not currently on treatment    Pan ulcerative colitis:  - Has established care with Dr. JEANETH Crawford and continues in follow up    Nutrition/Weight:    It is very common for patients to put on significant weight after liver transplantation, as they become conditioned to eating as much as possible to maintain a healthy weight pre-transplant.  There is a very significant risk of developing fatty liver and non-alcoholic steatohepatitis in post-transplant patients, even in those who were not transplanted for ALVES cirrhosis.  - Please work on eating a diet that is rice in fresh fruits and vegetables, moderate amounts of lean protein and dairy, and modest amounts of carbohydrates and fats.  - An exercise plan/regimen is an essential part of remaining healthy in the post-transplant setting and we recommend 30-35 minutes of exercise at least 4 days a week    Routine Health Care:  - You need to establish and maintain care with a primary care physician to manage: hypertension, high cholesterol, abnormal blood sugars - as these are all potential complications of your health after liver transplantation and will need a local physician to manage these issues.  - All patients with liver diseases (even post transplant) are at an increased risk for osteoporosis.  We strongly recommend screening for Vitamin D deficiency at least twice yearly with aggressive supplementation/replacement as indicated.    - We recommend a screening DEXA scan to evaluate for osteoporosis.  If present, should treat with calcium, Vitamin D supplementation, and recommend consideration of bisphosphonate therapy.  Also recommend follow up DEXA scans to evaluate for improvement of bone density on  therapy.  - Recommend yearly skin exams with dermatology, and if skin cancers are found, recommend at least twice yearly exams  - Recommend post transplant patients stay up to date for cancer screening: mammograms/PAP for women and prostate cancer screening for men, and colon cancer screening for all.  - Practice good hygiene with washing of hands and maintaining a clean living space as this will decrease your chances of developing an infection in the post-transplantation setting  - Eat a health diet: rich in fresh fruits and vegetables, lean proteins, and minimize carbohydrate and fat intake.  - Remain physically active: this is key to keeping the liver transplant healthy and patient's feeling strong and healthy    Follow Up:  1 year      Thank you very much for the opportunity to participate in the care of this patient.  If you have any further questions, please don't hesitate to contact our office.    __________________________________  Thomas M. Leventhal, M.D.   of Medicine  Advanced & Transplant Hepatology  [facility]

## 2023-12-11 NOTE — PROGRESS NOTES
Date of Encounter: 2023     Subjective:          Berta Nava is a 58 year old female presenting for follow up with history of liver transplantation    History of Present Illness   Berta Nava is a 58 year old female with past medical history of hypertension, microscopic colitis, ulcerative colitis, pancreatic mucinous cystadenoma, primary sclerosing cholangitis who presents in follow up s/p  donor liver transplant on 2021.  Also with a history of tissue-invasive CMV now presenting in follow up.    Since last seen she has been doing very well.  Notes that she has not had any hospitalizations or admissions in the past year.  She continues to take her medications as prescribed, without any challenges.    Notes that she has been on some weight and has been less active than previous, she has been dealing with multiple issues surrounding her 's current health.  She notes that she has several avenues that she can take up to further increase her exercise levels.    In the past year she has been seen by dermatology and she has follow-up in early .  She did undergo DEXA scan in May which demonstrated mildly reduced bone density but no real overt osteopenia nor osteoporosis.    Noted that recent CMV levels have been negative, and she is no longer on any therapy          Surgical Course  - OLT date: 2021  - etiology: PSC  - donor: type DBD  - CMV status D positive/R negative  - operation: Surgical technique caval replacement, biliary anastomosis: Duct to duct  - CiT 6 hrs 22 min, WiT 51 min  - Episodes of Rejection: : 2-3/9 ACR  - Biliary complications: none; stent removed 2022  - Other complications of immediate post-operative course: tissue invasive CMV 2022    Labs and Diagnostic tests:  CBC w/Diff    Lab Results   Component Value Date/Time    WBC 7.6 2023 09:14 AM    WBC 15.2 (H) 2021 09:09 AM    RBC 4.92 2023 09:14 AM    RBC 3.57  (L) 2021 09:09 AM    HGB 13.9 2023 09:14 AM    HGB 10.7 (L) 2021 09:09 AM    HCT 40.7 2023 09:14 AM    HCT 32.9 (L) 2021 09:09 AM    MCV 83 2023 09:14 AM    MCV 92 2021 09:09 AM    MCH 28.3 2023 09:14 AM    MCH 30.0 2021 09:09 AM    MCHC 34.2 2023 09:14 AM    MCHC 32.5 2021 09:09 AM    RDW 13.7 2023 09:14 AM    RDW 16.4 (H) 2021 09:09 AM         Comprehensive Metabolic Profile    Lab Results   Component Value Date/Time     2023 09:14 AM     (L) 2021 09:09 AM    CO2 23 2023 09:14 AM    CO2 24 2022 07:40 AM    CO2 21 2021 09:09 AM    BUN 17.9 2023 09:14 AM    BUN 18 2022 07:40 AM    BUN 23 2021 09:09 AM    CR 0.88 2023 09:14 AM    CR 0.69 2021 09:09 AM    Lab Results   Component Value Date/Time    ALBUMIN 4.4 2023 09:14 AM    ALBUMIN 3.8 2022 07:40 AM    ALBUMIN 2.8 (L) 2021 09:09 AM    ALKPHOS 61 2023 09:14 AM    ALKPHOS 386 (H) 2021 09:09 AM    AST 16 2023 09:14 AM    AST 58 (H) 2021 09:09 AM    ALT 8 2023 09:14 AM    ALT 32 2021 09:09 AM          Assessment and Plan:    S/P Liver Transplantation:   -She is status post  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  -Labs per protocol  - Excellent allograft function at this time  -We will continue with labs per protocol; every 3 month    Immunosuppression:   - On TAC  BID with goal trough 3-5  - Will plan to check immunosuppression trough levels per protocol to assess for adequate target dosing and will assess CBC and kidney function for toxicity of medications    Kidney Health:   -No acute issues at this time    Hypertension:  -She is currently on lisinopril as an antihypertensive, we recommended she continue to follow with her primary for aggressive management    CMV: tissue invasive  -Treated in   - not currently on  treatment    Pan ulcerative colitis:  - Has established care with Dr. JEANETH Crawford and continues in follow up    Nutrition/Weight:    It is very common for patients to put on significant weight after liver transplantation, as they become conditioned to eating as much as possible to maintain a healthy weight pre-transplant.  There is a very significant risk of developing fatty liver and non-alcoholic steatohepatitis in post-transplant patients, even in those who were not transplanted for ALVES cirrhosis.  - Please work on eating a diet that is rice in fresh fruits and vegetables, moderate amounts of lean protein and dairy, and modest amounts of carbohydrates and fats.  - An exercise plan/regimen is an essential part of remaining healthy in the post-transplant setting and we recommend 30-35 minutes of exercise at least 4 days a week    Routine Health Care:  - You need to establish and maintain care with a primary care physician to manage: hypertension, high cholesterol, abnormal blood sugars - as these are all potential complications of your health after liver transplantation and will need a local physician to manage these issues.  - All patients with liver diseases (even post transplant) are at an increased risk for osteoporosis.  We strongly recommend screening for Vitamin D deficiency at least twice yearly with aggressive supplementation/replacement as indicated.    - We recommend a screening DEXA scan to evaluate for osteoporosis.  If present, should treat with calcium, Vitamin D supplementation, and recommend consideration of bisphosphonate therapy.  Also recommend follow up DEXA scans to evaluate for improvement of bone density on therapy.  - Recommend yearly skin exams with dermatology, and if skin cancers are found, recommend at least twice yearly exams  - Recommend post transplant patients stay up to date for cancer screening: mammograms/PAP for women and prostate cancer screening for men, and colon cancer  screening for all.  - Practice good hygiene with washing of hands and maintaining a clean living space as this will decrease your chances of developing an infection in the post-transplantation setting  - Eat a health diet: rich in fresh fruits and vegetables, lean proteins, and minimize carbohydrate and fat intake.  - Remain physically active: this is key to keeping the liver transplant healthy and patient's feeling strong and healthy    Follow Up:  1 year      Thank you very much for the opportunity to participate in the care of this patient.  If you have any further questions, please don't hesitate to contact our office.    __________________________________  Thomas M. Leventhal, M.D.   of Medicine  Advanced & Transplant Hepatology  [facility]

## 2023-12-11 NOTE — NURSING NOTE
"Chief Complaint   Patient presents with    RECHECK     Post liver transplant     Vital signs:      BP: (!) 148/94 Pulse: 85     SpO2: 99 %     Height: 167.6 cm (5' 6\") (pt reported) Weight: 77.1 kg (170 lb)  Estimated body mass index is 27.44 kg/m  as calculated from the following:    Height as of this encounter: 1.676 m (5' 6\").    Weight as of this encounter: 77.1 kg (170 lb).      Chio Gonzalez CMA   12/11/2023 9:42 AM    "

## 2023-12-12 DIAGNOSIS — Z94.4 LIVER REPLACED BY TRANSPLANT (H): Primary | ICD-10-CM

## 2023-12-12 DIAGNOSIS — Z94.4 S/P LIVER TRANSPLANT (H): ICD-10-CM

## 2023-12-12 DIAGNOSIS — Z13.220 LIPID SCREENING: ICD-10-CM

## 2023-12-12 RX ORDER — TACROLIMUS 0.5 MG/1
0.5 CAPSULE ORAL EVERY 12 HOURS
Qty: 180 CAPSULE | Refills: 3 | Status: SHIPPED | OUTPATIENT
Start: 2023-12-12

## 2023-12-12 RX ORDER — TACROLIMUS 1 MG/1
1 CAPSULE ORAL EVERY 12 HOURS
Qty: 180 CAPSULE | Refills: 3 | Status: SHIPPED | OUTPATIENT
Start: 2023-12-12

## 2023-12-12 NOTE — TELEPHONE ENCOUNTER
ISSUE:   Tacrolimus IR level 3 on 12/11, goal 3-5 but closer to 5 per discussion w/ Dr Leventhal in clinic yesterday, dose 1 mg in the morning and 1.5 mg in the evening.    PLAN:   Call Patient and confirm this was an accurate 12-hour trough.   Verify Tacrolimus IR dose .  Confirm no new medications or illness.   Confirm no missed doses.   If accurate trough and accurate dose, increase Tacrolimus IR dose to 1.5 mg BID     Is this more than a 50% increase or decrease in current IS dose: No      Repeat labs in 2 months.  *If > 50% change in immunosuppression dose, repeat labs in 1 week.   Vanessa Durant RN     OUTCOME:   Spoke with Patient, they confirm accurate trough level and current dose 1 mg am, 1.5 mg pm.   Patient confirmed dose change to 1.5 mg BID.  Patientagreed to repeat labs in 2 months.   Orders sent to preferred pharmacy for dose change and lab for repeat labs.   Patient voiced understanding of plan.

## 2023-12-14 NOTE — ANESTHESIA PREPROCEDURE EVALUATION
Anesthesia Pre-Procedure Evaluation    Patient: Berta Nava   MRN: [id number] : 1965        Procedure : Procedure(s):  COLONOSCOPY          Past Medical History:   Diagnosis Date     Ascites      Biliary cirrhosis (H)      Cholangitis, sclerosing (H28)      Cirrhosis of liver with ascites (H) 2021     Hearing loss of left ear     wears a hearing aide     History of blood transfusion      History of low potassium      Hyperlipidemia      Hypertension      Liver transplant rejection (H) 2022    Mild Rejection     SBP (spontaneous bacterial peritonitis) (H) 2021     Sjogren's syndrome (H24)      Ulcerative colitis (H)      Ulcerative pancolitis (H)       Past Surgical History:   Procedure Laterality Date     BENCH LIVER N/A 2021    Procedure: Bench liver;  Surgeon: Ernesto Schmitt MD;  Location:  OR     COLONOSCOPY N/A 2022    Procedure: COLONOSCOPY, WITH BIOPSY;  Surgeon: Leventhal, Thomas Michael, MD;  Location: [facility] OR     COLONOSCOPY N/A 10/31/2022    Procedure: COLONOSCOPY, WITH POLYPECTOMY AND BIOPSY;  Surgeon: Kendrick Crawford MD;  Location: [facility] OR     COLONOSCOPY N/A 10/31/2023    Procedure: Colonoscopy with biopsy and polypectomy;  Surgeon: Kendrick Crawford MD;  Location: [facility] OR     COMBINED ESOPHAGOSCOPY, GASTROSCOPY, DUODENOSCOPY (EGD), REMOVE BILIARY STENT N/A 2022    Procedure: ESOPHAGOGASTRODUODENOSCOPY, WITH BILIARY STENT REMOVAL;  Surgeon: Leventhal, Thomas Michael, MD;  Location: [facility] OR     CV CORONARY ANGIOGRAM N/A 2021    Procedure: Coronary Angiogram with possible intervention;  Surgeon: David Wilhelm MD;  Location:  [facility] CATH LAB     ENDOSCOPIC RETROGRADE CHOLANGIOPANCREATOGRAM N/A 2021    Procedure: ENDOSCOPIC RETROGRADE CHOLANGIOPANCREATOGRAPHY with ballon sweep of bile ducts for stones, balloon dilation of bile ducts and bile duct stent placement;  Surgeon: Gregory Gabriel MD;  Location:  [facility] OR     ENDOSCOPIC RETROGRADE CHOLANGIOPANCREATOGRAM N/A 07/22/2021    Procedure: ENDOSCOPIC RETROGRADE CHOLANGIOPANCREATOGRAPHY STONE REMOVAL, DILATION AND STENT PLACEMENT;  Surgeon: Gregory Gabriel MD;  Location: [facility] OR     ENDOSCOPIC RETROGRADE CHOLANGIOPANCREATOGRAPHY, EXCHANGE TUBE/STENT N/A 05/05/2021    Procedure: ENDOSCOPIC RETROGRADE CHOLANGIOPANCREATOGRAPHY WITH STENT EXCHANGE, STONE EXTRACTION, AND DILATION;  Surgeon: Gregory Gabriel MD;  Location: [facility] OR     ENDOSCOPIC RETROGRADE CHOLANGIOPANCREATOGRAPHY, EXCHANGE TUBE/STENT N/A 05/10/2021    Procedure: ENDOSCOPIC RETROGRADE CHOLANGIOPANCREATOGRAPHY with biliary dilation, stone removal, stent exchange;  Surgeon: Gregory Gabriel MD;  Location: [facility] OR     ENDOSCOPIC RETROGRADE CHOLANGIOPANCREATOGRAPHY, EXCHANGE TUBE/STENT N/A 06/10/2021    Procedure: ENDOSCOPIC RETROGRADE CHOLANGIOPANCREATOGRAPHY, WITH biliary stent exchange, stone removal;  Surgeon: Woodrow Lott MD;  Location: [facility] OR     ENDOSCOPIC RETROGRADE CHOLANGIOPANCREATOGRAPHY, EXCHANGE TUBE/STENT N/A 08/30/2021    Procedure: ENDOSCOPIC RETROGRADE CHOLANGIOPANCREATOGRAPHY with biliary dilation, stone removal, stent exchange;  Surgeon: Gregory Gabriel MD;  Location: [facility] OR     ENDOSCOPIC RETROGRADE CHOLANGIOPANCREATOGRAPHY, EXCHANGE TUBE/STENT N/A 10/01/2021    Procedure: ENDOSCOPIC RETROGRADE CHOLANGIOPANCREATOGRAPHY with balloon sweep of bile ducts, bile duct stent exchanged;  Surgeon: Gregory Gabriel MD;  Location: [facility] OR     ESOPHAGOSCOPY, GASTROSCOPY, DUODENOSCOPY (EGD), COMBINED N/A 09/04/2021    Procedure: ESOPHAGOGASTRODUODENOSCOPY (EGD);  Surgeon: Leventhal, Thomas Michael, MD;  Location: [facility] GI     ESOPHAGOSCOPY, GASTROSCOPY, DUODENOSCOPY (EGD), COMBINED N/A 10/01/2021    Procedure: ESOPHAGOGASTRODUODENOSCOPY (EGD) with varices banding;  Surgeon: Gregory Gabriel MD;  Location: [facility] OR     GYN SURGERY      bilat fallopian tubes and ovaries removed     IR LIVER BIOPSY PERCUTANEOUS  05/17/2022      PICC DOUBLE LUMEN PLACEMENT Right 2021    42cm (2cm external), Lateral brachial vein     RETURN LIVER TRANSPLANT N/A 2021    Procedure: CLOSURE, WOUND, ABDOMEN, SECONDARY, ABDOMINAL WASHOUT;  Surgeon: Ernesto Schmitt MD;  Location: [facility] OR     TRANSPLANT  2021    Liver due to PSC     TRANSPLANT LIVER RECIPIENT  DONOR N/A 2021    Procedure: Opening of abdomen, Abdominal Exploration and aborted liver transplant.;  Surgeon: Ernesto Schmitt MD;  Location: [facility] OR     TRANSPLANT LIVER RECIPIENT  DONOR N/A 2021    Procedure: TRANSPLANT, LIVER, RECIPIENT,  DONOR;  Surgeon: Ernesto Schmitt MD;  Location: [facility] OR      Allergies   Allergen Reactions     Iodinated Contrast Media Hives     PATIENT HAD HIVE REACTION AFTER ADMINISTERING CT CONTRAST DYE.       Contrast Dye       Social History     Tobacco Use     Smoking status: Never     Smokeless tobacco: Never   Substance Use Topics     Alcohol use: No     Comment: Last drink was       Wt Readings from Last 1 Encounters:   23 77.1 kg (170 lb)        Anesthesia Evaluation   Pt has had prior anesthetic. Type: General and MAC.        ROS/MED HX  ENT/Pulmonary:  - neg pulmonary ROS     Neurologic:  - neg neurologic ROS     Cardiovascular:     (+)  hypertension- -   -  - -                                      METS/Exercise Tolerance:     Hematologic:     (+)      anemia, history of blood transfusion, no previous transfusion reaction, Known PRBC Anitbodies:No       Musculoskeletal:   (+)  arthritis,             GI/Hepatic: Comment: UC      Renal/Genitourinary:       Endo:     (+)            Chronic steroid usage for Post Transplant Immunosuppression.         Psychiatric/Substance Use:     (+)   alcohol abuse      Infectious Disease:  - neg infectious disease ROS     Malignancy:       Other:            Physical Exam    Airway  airway exam normal           Respiratory Devices and Support         Dental  no  notable dental history         Cardiovascular   cardiovascular exam normal          Pulmonary   pulmonary exam normal              OUTSIDE LABS:  CBC:   Lab Results   Component Value Date    WBC 7.6 12/11/2023    WBC 7.9 09/19/2023    HGB 13.9 12/11/2023    HGB 13.3 09/19/2023    HCT 40.7 12/11/2023    HCT 38.6 09/19/2023     12/11/2023     09/19/2023     BMP:   Lab Results   Component Value Date     12/11/2023     09/19/2023    POTASSIUM 3.9 12/11/2023    POTASSIUM 4.5 09/19/2023    CHLORIDE 104 12/11/2023    CHLORIDE 107 09/19/2023    CO2 23 12/11/2023    CO2 22 09/19/2023    BUN 17.9 12/11/2023    BUN 17.7 09/19/2023    CR 0.88 12/11/2023    CR 0.81 09/19/2023    GLC 95 12/11/2023    GLC 87 09/19/2023     COAGS:   Lab Results   Component Value Date    PTT 36 12/03/2021    INR 1.09 05/17/2022    FIBR 465 11/22/2021     POC:   Lab Results   Component Value Date    BGM 95 06/25/2021     HEPATIC:   Lab Results   Component Value Date    ALBUMIN 4.4 12/11/2023    PROTTOTAL 7.7 12/11/2023    ALT 8 12/11/2023    AST 16 12/11/2023    ALKPHOS 61 12/11/2023    BILITOTAL 0.6 12/11/2023    MIHIR 26 11/19/2021     OTHER:   Lab Results   Component Value Date    PH 7.48 (H) 11/20/2021    LACT 0.6 (L) 12/03/2021    A1C 4.6 03/22/2023    MARIELENA 9.5 12/11/2023    PHOS 4.1 05/08/2023    MAG 1.8 08/10/2022    LIPASE 155 12/03/2021    AMYLASE 36 11/19/2021    CRP 54.0 (H) 11/17/2021    SED 20 04/10/2023       Anesthesia Plan    ASA Status:  3    NPO Status:  NPO Appropriate    Anesthesia Type: MAC.     - Reason for MAC: immobility needed   Induction: Intravenous.   Maintenance: TIVA.        Consents    Anesthesia Plan(s) and associated risks, benefits, and realistic alternatives discussed. Questions answered and patient/representative(s) expressed understanding.     - Discussed:     - Discussed with:  Patient      - Extended Intubation/Ventilatory Support Discussed: No.      - Patient is DNR/DNI Status: No      Use of blood products discussed: No .     Postoperative Care       PONV prophylaxis: Background Propofol Infusion     Comments:           H&P reviewed: Unable to attach H&P to encounter due to EHR limitations. H&P Update: appropriate H&P reviewed, patient examined. No interval changes since H&P (within 30 days).         Bruce Galan MD

## 2023-12-14 NOTE — ANESTHESIA POSTPROCEDURE EVALUATION
Patient: Berta Nava    Procedure: Procedure(s):  Colonoscopy with biopsy and polypectomy       Anesthesia Type:  MAC    Note:  Disposition: Outpatient   Postop Pain Control: Uneventful            Sign Out: Well controlled pain   PONV: No   Neuro/Psych: Uneventful            Sign Out: Acceptable/Baseline neuro status   Airway/Respiratory: Uneventful            Sign Out: Acceptable/Baseline resp. status   CV/Hemodynamics: Uneventful            Sign Out: Acceptable CV status; No obvious hypovolemia; No obvious fluid overload   Other NRE: NONE   DID A NON-ROUTINE EVENT OCCUR? No       Last vitals:  Vitals Value Taken Time   /94 12/11/23 0938   Temp 36.7  C (98  F) 10/31/23 0900   Pulse 85 12/11/23 0938   Resp 18 10/31/23 0900   SpO2 99 % 12/11/23 0938       Electronically Signed By: Bruce Galan MD  December 14, 2023  7:20 AM

## 2024-02-13 ENCOUNTER — DOCUMENTATION ONLY (OUTPATIENT)
Dept: TRANSPLANT | Facility: CLINIC | Age: 59
End: 2024-02-13

## 2024-02-13 ASSESSMENT — ENCOUNTER SYMPTOMS: NEW SYMPTOMS OF CORONARY ARTERY DISEASE: 0

## 2024-02-27 ENCOUNTER — LAB (OUTPATIENT)
Dept: LAB | Facility: CLINIC | Age: 59
End: 2024-02-27
Payer: COMMERCIAL

## 2024-02-27 DIAGNOSIS — Z13.220 LIPID SCREENING: ICD-10-CM

## 2024-02-27 DIAGNOSIS — Z94.4 LIVER REPLACED BY TRANSPLANT (H): ICD-10-CM

## 2024-02-27 LAB
ALBUMIN MFR UR ELPH: 10.2 MG/DL
ALBUMIN SERPL BCG-MCNC: 4.4 G/DL (ref 3.5–5.2)
ALBUMIN UR-MCNC: NEGATIVE MG/DL
ALP SERPL-CCNC: 52 U/L (ref 40–150)
ALT SERPL W P-5'-P-CCNC: 14 U/L (ref 0–50)
ANION GAP SERPL CALCULATED.3IONS-SCNC: 10 MMOL/L (ref 7–15)
APPEARANCE UR: CLEAR
AST SERPL W P-5'-P-CCNC: 18 U/L (ref 0–45)
BILIRUB DIRECT SERPL-MCNC: <0.2 MG/DL (ref 0–0.3)
BILIRUB SERPL-MCNC: 0.4 MG/DL
BILIRUB UR QL STRIP: NEGATIVE
BUN SERPL-MCNC: 24.5 MG/DL (ref 8–23)
CALCIUM SERPL-MCNC: 9.2 MG/DL (ref 8.8–10.2)
CHLORIDE SERPL-SCNC: 108 MMOL/L (ref 98–107)
CHOLEST SERPL-MCNC: 253 MG/DL
COLOR UR AUTO: YELLOW
CREAT SERPL-MCNC: 0.97 MG/DL (ref 0.51–0.95)
CREAT UR-MCNC: 168 MG/DL
DEPRECATED HCO3 PLAS-SCNC: 22 MMOL/L (ref 22–29)
EGFRCR SERPLBLD CKD-EPI 2021: 67 ML/MIN/1.73M2
ERYTHROCYTE [DISTWIDTH] IN BLOOD BY AUTOMATED COUNT: 13.7 % (ref 10–15)
FASTING STATUS PATIENT QL REPORTED: YES
GLUCOSE SERPL-MCNC: 86 MG/DL (ref 70–99)
GLUCOSE UR STRIP-MCNC: NEGATIVE MG/DL
HCT VFR BLD AUTO: 37.7 % (ref 35–47)
HDLC SERPL-MCNC: 69 MG/DL
HGB BLD-MCNC: 13 G/DL (ref 11.7–15.7)
HGB UR QL STRIP: NEGATIVE
KETONES UR STRIP-MCNC: NEGATIVE MG/DL
LDLC SERPL CALC-MCNC: 168 MG/DL
LEUKOCYTE ESTERASE UR QL STRIP: NEGATIVE
MAGNESIUM SERPL-MCNC: 1.7 MG/DL (ref 1.7–2.3)
MCH RBC QN AUTO: 29 PG (ref 26.5–33)
MCHC RBC AUTO-ENTMCNC: 34.5 G/DL (ref 31.5–36.5)
MCV RBC AUTO: 84 FL (ref 78–100)
NITRATE UR QL: NEGATIVE
NONHDLC SERPL-MCNC: 184 MG/DL
PH UR STRIP: 6 [PH] (ref 5–7)
PHOSPHATE SERPL-MCNC: 3.9 MG/DL (ref 2.5–4.5)
PLATELET # BLD AUTO: 193 10E3/UL (ref 150–450)
POTASSIUM SERPL-SCNC: 4 MMOL/L (ref 3.4–5.3)
PROT SERPL-MCNC: 7.1 G/DL (ref 6.4–8.3)
PROT/CREAT 24H UR: 0.06 MG/MG CR (ref 0–0.2)
RBC # BLD AUTO: 4.48 10E6/UL (ref 3.8–5.2)
SODIUM SERPL-SCNC: 140 MMOL/L (ref 135–145)
SP GR UR STRIP: >=1.03 (ref 1–1.03)
TACROLIMUS BLD-MCNC: 4.2 UG/L (ref 5–15)
TME LAST DOSE: ABNORMAL H
TME LAST DOSE: ABNORMAL H
TRIGL SERPL-MCNC: 80 MG/DL
UROBILINOGEN UR STRIP-ACNC: 0.2 E.U./DL
WBC # BLD AUTO: 8.8 10E3/UL (ref 4–11)

## 2024-02-27 PROCEDURE — 84100 ASSAY OF PHOSPHORUS: CPT

## 2024-02-27 PROCEDURE — 80061 LIPID PANEL: CPT

## 2024-02-27 PROCEDURE — 36415 COLL VENOUS BLD VENIPUNCTURE: CPT

## 2024-02-27 PROCEDURE — 82248 BILIRUBIN DIRECT: CPT

## 2024-02-27 PROCEDURE — 81003 URINALYSIS AUTO W/O SCOPE: CPT

## 2024-02-27 PROCEDURE — 84156 ASSAY OF PROTEIN URINE: CPT

## 2024-02-27 PROCEDURE — 80053 COMPREHEN METABOLIC PANEL: CPT

## 2024-02-27 PROCEDURE — 83735 ASSAY OF MAGNESIUM: CPT

## 2024-02-27 PROCEDURE — 85027 COMPLETE CBC AUTOMATED: CPT

## 2024-02-27 PROCEDURE — 80197 ASSAY OF TACROLIMUS: CPT

## 2024-03-06 ENCOUNTER — PATIENT OUTREACH (OUTPATIENT)
Dept: CARE COORDINATION | Facility: CLINIC | Age: 59
End: 2024-03-06
Payer: COMMERCIAL

## 2024-04-11 ENCOUNTER — OFFICE VISIT (OUTPATIENT)
Dept: DERMATOLOGY | Facility: CLINIC | Age: 59
End: 2024-04-11
Payer: COMMERCIAL

## 2024-04-11 DIAGNOSIS — L81.7 PIGMENTED PURPURIC DERMATOSIS: ICD-10-CM

## 2024-04-11 DIAGNOSIS — L82.0 INFLAMED SEBORRHEIC KERATOSIS: ICD-10-CM

## 2024-04-11 DIAGNOSIS — B35.1 ONYCHOMYCOSIS: Primary | ICD-10-CM

## 2024-04-11 PROCEDURE — 99214 OFFICE O/P EST MOD 30 MIN: CPT | Mod: 25 | Performed by: STUDENT IN AN ORGANIZED HEALTH CARE EDUCATION/TRAINING PROGRAM

## 2024-04-11 PROCEDURE — 11306 SHAVE SKIN LESION 0.6-1.0 CM: CPT | Performed by: STUDENT IN AN ORGANIZED HEALTH CARE EDUCATION/TRAINING PROGRAM

## 2024-04-11 NOTE — NURSING NOTE
Dermatology Rooming Note    Berta Nava's goals for this visit include:   Chief Complaint   Patient presents with    Skin Check     Annual FBSE and rash on lower leg. Pt states she has not noticed any worsening for the rash.     Ruslan Camacho, EMT-B

## 2024-04-11 NOTE — PROGRESS NOTES
[facility] Dermatology Note    Encounter Date: Apr 11, 2024    Dermatology Problem List:  1. Liver transplant 11/18/2021  - Tacrolimus, prednisone, mycophenolate  2. Pilar cysts  - Pending excision  3. Hx BCC in her 20s, unknown location  4. 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    Diagnoses and all orders for this visit:    Onychomycosis  Continue topical antifungal    Pigmented purpuric dermatosis  -Stable  - No recent severe flares    Inflamed seborrheic keratosis  -     SHAV SKIN LESION SCLP/NCK/HNDS/FEET/GEN 0.6-1.0 CM  - 7mm  -Painful, getting caught on clothing, occasionally bleeding  - Due to intolerable side effects, shave removal performed for palliation of symptoms  - See procedure note    Other orders  -     Adult Dermatology Clinic Follow-Up Order (Blank); Future        - Shave Removal PROCEDURE NOTE: After written informed consent was obtained, a time out was taken to identify the patient and the correct site for removal. The lesion on the L neck 7mm was cleansed with a 70% isopropyl alcohol wipe, and then injected with 1cc of lidocaine 1% with epinephrine 1:100,000. Once anesthesia was ensured, the visible surface of the lesion was removed using traction and hyfrecator on a setting of 10 in standard technique applied to the base of lesion. Hemostasis was obtained with pressure and aluminum chloride 20% solution. The wound was dressed with white petrolatum and an adhesive bandage. The patient tolerated the procedure well. Post-procedure instructions and recommendations were provided both verbally and in writing.     Follow-up in 1 year .       Staff Involved:  Staff Only    Huseyin Cole MD   of  Dermatology  Department of Dermatology  [facility] [employer]      CC:   Chief Complaint   Patient presents with    Skin Check     Annual FBSE and rash on lower leg. Pt states she has not noticed any worsening for the rash.       History of Present Illness:  Ms. Berta Nava is a 58 year old female who presents as a return patient.    Last seen 04/2023 for rosacea, rec sunscreen and Protopic.  Additionally we had biopsied and confirmed pigmented purpuric dermatosis in the lower extremities.  Discussed treatment options including oral route aside ascorbic acid.    Presents today with concerns about a lesion on her left neck, as well as some hyperpigmentation of her ankles which looks consistent with tanned skin    Labs:      Physical exam:  Vitals: LMP  (LMP Unknown)   GEN: well developed, well-nourished, in no acute distress, in a pleasant mood.     SKIN: Figueredo phototype 1  - Full skin, which includes the head/face, both arms, chest, back, abdomen,both legs, genitalia and/or groin buttocks, digits and/or nails, was examined.  - Flat brown macules and patches in a sun exposed areas on face and extremities  - 7mm papule L neck,   - No other lesions of concern on areas examined.     Past Medical History:   Past Medical History:   Diagnosis Date    Ascites     Biliary cirrhosis (H)     Cholangitis, sclerosing (H28)     Cirrhosis of liver with ascites (H) 03/03/2021    Hearing loss of left ear     wears a hearing aide    History of blood transfusion     History of low potassium     Hyperlipidemia     Hypertension     Liver transplant rejection (H) 05/17/2022    Mild Rejection    SBP (spontaneous bacterial peritonitis) (H) 04/30/2021    Sjogren's syndrome (H24)     Ulcerative colitis (H)     Ulcerative pancolitis (H)      Past Surgical History:   Procedure Laterality Date    BENCH LIVER N/A 11/18/2021    Procedure: Bench liver;  Surgeon: Ernesto Schmitt MD;  Location:  OR     COLONOSCOPY N/A 06/08/2022    Procedure: COLONOSCOPY, WITH BIOPSY;  Surgeon: Leventhal, Thomas Michael, MD;  Location: [facility] OR    COLONOSCOPY N/A 10/31/2022    Procedure: COLONOSCOPY, WITH POLYPECTOMY AND BIOPSY;  Surgeon: Kendrick Crawford MD;  Location: [facility] OR    COLONOSCOPY N/A 10/31/2023    Procedure: Colonoscopy with biopsy and polypectomy;  Surgeon: Kendrick Crawford MD;  Location: [facility] OR    COMBINED ESOPHAGOSCOPY, GASTROSCOPY, DUODENOSCOPY (EGD), REMOVE BILIARY STENT N/A 02/09/2022    Procedure: ESOPHAGOGASTRODUODENOSCOPY, WITH BILIARY STENT REMOVAL;  Surgeon: Leventhal, Thomas Michael, MD;  Location: [facility] OR    CV CORONARY ANGIOGRAM N/A 08/25/2021    Procedure: Coronary Angiogram with possible intervention;  Surgeon: David Wilhelm MD;  Location:  HEART CARDIAC CATH LAB    ENDOSCOPIC RETROGRADE CHOLANGIOPANCREATOGRAM N/A 06/25/2021    Procedure: ENDOSCOPIC RETROGRADE CHOLANGIOPANCREATOGRAPHY with ballon sweep of bile ducts for stones, balloon dilation of bile ducts and bile duct stent placement;  Surgeon: Gregory Gabriel MD;  Location: [facility] OR    ENDOSCOPIC RETROGRADE CHOLANGIOPANCREATOGRAM N/A 07/22/2021    Procedure: ENDOSCOPIC RETROGRADE CHOLANGIOPANCREATOGRAPHY STONE REMOVAL, DILATION AND STENT PLACEMENT;  Surgeon: Gregory Gabriel MD;  Location:  OR    ENDOSCOPIC RETROGRADE CHOLANGIOPANCREATOGRAPHY, EXCHANGE TUBE/STENT N/A 05/05/2021    Procedure: ENDOSCOPIC RETROGRADE CHOLANGIOPANCREATOGRAPHY WITH STENT EXCHANGE, STONE EXTRACTION, AND DILATION;  Surgeon: Gregory Gabriel MD;  Location: [facility] OR    ENDOSCOPIC RETROGRADE CHOLANGIOPANCREATOGRAPHY, EXCHANGE TUBE/STENT N/A 05/10/2021    Procedure: ENDOSCOPIC RETROGRADE CHOLANGIOPANCREATOGRAPHY with biliary dilation, stone removal, stent exchange;  Surgeon: Gregory Gabriel MD;  Location: [facility] OR    ENDOSCOPIC RETROGRADE CHOLANGIOPANCREATOGRAPHY, EXCHANGE TUBE/STENT N/A 06/10/2021    Procedure: ENDOSCOPIC RETROGRADE CHOLANGIOPANCREATOGRAPHY,  WITH biliary stent exchange, stone removal;  Surgeon: Woodrow Lott MD;  Location: [facility] OR    ENDOSCOPIC RETROGRADE CHOLANGIOPANCREATOGRAPHY, EXCHANGE TUBE/STENT N/A 2021    Procedure: ENDOSCOPIC RETROGRADE CHOLANGIOPANCREATOGRAPHY with biliary dilation, stone removal, stent exchange;  Surgeon: Gregory Gabriel MD;  Location: [facility] OR    ENDOSCOPIC RETROGRADE CHOLANGIOPANCREATOGRAPHY, EXCHANGE TUBE/STENT N/A 10/01/2021    Procedure: ENDOSCOPIC RETROGRADE CHOLANGIOPANCREATOGRAPHY with balloon sweep of bile ducts, bile duct stent exchanged;  Surgeon: Gregory Gabriel MD;  Location: [facility] OR    ESOPHAGOSCOPY, GASTROSCOPY, DUODENOSCOPY (EGD), COMBINED N/A 2021    Procedure: ESOPHAGOGASTRODUODENOSCOPY (EGD);  Surgeon: Leventhal, Thomas Michael, MD;  Location: [facility] GI    ESOPHAGOSCOPY, GASTROSCOPY, DUODENOSCOPY (EGD), COMBINED N/A 10/01/2021    Procedure: ESOPHAGOGASTRODUODENOSCOPY (EGD) with varices banding;  Surgeon: Gregory Gabriel MD;  Location: [facility] OR    GYN SURGERY      bilat fallopian tubes and ovaries removed    IR LIVER BIOPSY PERCUTANEOUS  2022    PICC DOUBLE LUMEN PLACEMENT Right 2021    42cm (2cm external), Lateral brachial vein    RETURN LIVER TRANSPLANT N/A 2021    Procedure: CLOSURE, WOUND, ABDOMEN, SECONDARY, ABDOMINAL WASHOUT;  Surgeon: Ernesto Schmitt MD;  Location: [facility] OR    TRANSPLANT  2021    Liver due to PSC    TRANSPLANT LIVER RECIPIENT  DONOR N/A 2021    Procedure: Opening of abdomen, Abdominal Exploration and aborted liver transplant.;  Surgeon: Ernesto Schmitt MD;  Location: [facility] OR    TRANSPLANT LIVER RECIPIENT  DONOR N/A 2021    Procedure: TRANSPLANT, LIVER, RECIPIENT,  DONOR;  Surgeon: Ernesto Schmitt MD;  Location: [facility] OR       Social History:   reports that she has never smoked. She has never used smokeless tobacco. She reports that she does not drink alcohol and does not use drugs.    Family History:  Family  History   Problem Relation Age of Onset    Cancer Mother         angiosarcoma    Hypertension Mother     Hyperlipidemia Mother     Other Cancer Mother     Coronary Artery Disease Early Onset Father         MI age 46    Heart Transplant Father     Coronary Artery Disease Father     Melanoma Sister     Skin Cancer Sister     Liver Disease No family hx of     Ulcerative Colitis No family hx of     Crohn's Disease No family hx of     Glaucoma No family hx of     Macular Degeneration No family hx of        Medications:  Current Outpatient Medications   Medication Sig Dispense Refill    lisinopril (ZESTRIL) 10 MG tablet Take 1 tablet (10 mg) by mouth daily 90 tablet 3    multivitamin (CENTRUM SILVER) tablet Take 1 tablet by mouth daily      tacrolimus (GENERIC) 0.5 MG capsule Take 1 capsule (0.5 mg) by mouth every 12 hours Total dose: 1.5 mg  capsule 3    tacrolimus (GENERIC) 1 MG capsule Take 1 capsule (1 mg) by mouth every 12 hours Total dose: 1.5 mg  capsule 3    tacrolimus (PROTOPIC) 0.1 % external ointment Apply topically 2 times daily 60 g 1    nitroFURantoin macrocrystal-monohydrate (MACROBID) 100 MG capsule Take 1 capsule (100 mg) by mouth 2 times daily (Patient not taking: Reported on 4/11/2024) 10 capsule 0    sulfamethoxazole-trimethoprim (BACTRIM DS) 800-160 MG tablet Take 1 tablet by mouth 2 times daily (Patient not taking: Reported on 4/11/2024) 6 tablet 0     Allergies   Allergen Reactions    Iodinated Contrast Media Hives     PATIENT HAD HIVE REACTION AFTER ADMINISTERING CT CONTRAST DYE.      Contrast Dye

## 2024-04-11 NOTE — LETTER
4/11/2024       RE: Berta Nava  [address] [facility] [id number]     Dear Colleague,    Thank you for referring your patient, Berta Nava, to the [facility] DERMATOLOGY CLINIC [place] at [facility]. Please see a copy of my visit note below.    Dermatology Rooming Note    Berta Nava's goals for this visit include:   Chief Complaint   Patient presents with    Skin Check     Annual FBSE and rash on lower leg. Pt states she has not noticed any worsening for the rash.     Ruslan Camacho, EMT-B      [facility] Dermatology Note    Encounter Date: Apr 11, 2024    Dermatology Problem List:  1. Liver transplant 11/18/2021  - Tacrolimus, prednisone, mycophenolate  2. Pilar cysts  - Pending excision  3. Hx BCC in her 20s, unknown location  4. 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    Diagnoses and all orders for this visit:    Onychomycosis  Continue topical antifungal    Pigmented purpuric dermatosis  -Stable  - No recent severe flares    Inflamed seborrheic keratosis  -     SHAV SKIN LESION SCLP/NCK/HNDS/FEET/GEN 0.6-1.0 CM  - 7mm  -Painful, getting caught on clothing, occasionally bleeding  - Due to intolerable side effects, shave removal performed for palliation of symptoms  - See procedure note    Other orders  -     Adult Dermatology Clinic Follow-Up Order (Blank); Future        - Shave Removal PROCEDURE NOTE: After written informed consent was obtained, a time out was taken to identify the patient and the correct site for removal. The lesion on the L neck 7mm was cleansed with a 70% isopropyl alcohol wipe, and then injected with 1cc of lidocaine 1% with  epinephrine 1:100,000. Once anesthesia was ensured, the visible surface of the lesion was removed using traction and hyfrecator on a setting of 10 in standard technique applied to the base of lesion. Hemostasis was obtained with pressure and aluminum chloride 20% solution. The wound was dressed with white petrolatum and an adhesive bandage. The patient tolerated the procedure well. Post-procedure instructions and recommendations were provided both verbally and in writing.     Follow-up in 1 year .       Staff Involved:  Staff Only    Huseyin Cole MD   of Dermatology  Department of Dermatology  [facility] [employer]      CC:   Chief Complaint   Patient presents with    Skin Check     Annual FBSE and rash on lower leg. Pt states she has not noticed any worsening for the rash.       History of Present Illness:  Ms. Berta Nava is a 58 year old female who presents as a return patient.    Last seen 04/2023 for rosacea, rec sunscreen and Protopic.  Additionally we had biopsied and confirmed pigmented purpuric dermatosis in the lower extremities.  Discussed treatment options including oral route aside ascorbic acid.    Presents today with concerns about a lesion on her left neck, as well as some hyperpigmentation of her ankles which looks consistent with tanned skin    Labs:      Physical exam:  Vitals: LMP  (LMP Unknown)   GEN: well developed, well-nourished, in no acute distress, in a pleasant mood.     SKIN: Figueredo phototype 1  - Full skin, which includes the head/face, both arms, chest, back, abdomen,both legs, genitalia and/or groin buttocks, digits and/or nails, was examined.  - Flat brown macules and patches in a sun exposed areas on face and extremities  - 7mm papule L neck,   - No other lesions of concern on areas examined.     Past Medical History:   Past Medical History:   Diagnosis Date    Ascites     Biliary cirrhosis (H)     Cholangitis, sclerosing (H28)      Cirrhosis of liver with ascites (H) 03/03/2021    Hearing loss of left ear     wears a hearing aide    History of blood transfusion     History of low potassium     Hyperlipidemia     Hypertension     Liver transplant rejection (H) 05/17/2022    Mild Rejection    SBP (spontaneous bacterial peritonitis) (H) 04/30/2021    Sjogren's syndrome (H24)     Ulcerative colitis (H)     Ulcerative pancolitis (H)      Past Surgical History:   Procedure Laterality Date    BENCH LIVER N/A 11/18/2021    Procedure: Bench liver;  Surgeon: Ernesto Schmitt MD;  Location: [facility] OR    COLONOSCOPY N/A 06/08/2022    Procedure: COLONOSCOPY, WITH BIOPSY;  Surgeon: Leventhal, Thomas Michael, MD;  Location: [facility] OR    COLONOSCOPY N/A 10/31/2022    Procedure: COLONOSCOPY, WITH POLYPECTOMY AND BIOPSY;  Surgeon: Kendrick Crawford MD;  Location: [facility] OR    COLONOSCOPY N/A 10/31/2023    Procedure: Colonoscopy with biopsy and polypectomy;  Surgeon: Kendrick Crawford MD;  Location: [facility] OR    COMBINED ESOPHAGOSCOPY, GASTROSCOPY, DUODENOSCOPY (EGD), REMOVE BILIARY STENT N/A 02/09/2022    Procedure: ESOPHAGOGASTRODUODENOSCOPY, WITH BILIARY STENT REMOVAL;  Surgeon: Leventhal, Thomas Michael, MD;  Location: [facility] OR    CV CORONARY ANGIOGRAM N/A 08/25/2021    Procedure: Coronary Angiogram with possible intervention;  Surgeon: David Wilhelm MD;  Location:  HEART CARDIAC CATH LAB    ENDOSCOPIC RETROGRADE CHOLANGIOPANCREATOGRAM N/A 06/25/2021    Procedure: ENDOSCOPIC RETROGRADE CHOLANGIOPANCREATOGRAPHY with ballon sweep of bile ducts for stones, balloon dilation of bile ducts and bile duct stent placement;  Surgeon: Gregory Gabriel MD;  Location:  OR    ENDOSCOPIC RETROGRADE CHOLANGIOPANCREATOGRAM N/A 07/22/2021    Procedure: ENDOSCOPIC RETROGRADE CHOLANGIOPANCREATOGRAPHY STONE REMOVAL, DILATION AND STENT PLACEMENT;  Surgeon: Gregory Gabriel MD;  Location:  OR    ENDOSCOPIC RETROGRADE CHOLANGIOPANCREATOGRAPHY, EXCHANGE  TUBE/STENT N/A 05/05/2021    Procedure: ENDOSCOPIC RETROGRADE CHOLANGIOPANCREATOGRAPHY WITH STENT EXCHANGE, STONE EXTRACTION, AND DILATION;  Surgeon: Gregory Gabriel MD;  Location: [facility] OR    ENDOSCOPIC RETROGRADE CHOLANGIOPANCREATOGRAPHY, EXCHANGE TUBE/STENT N/A 05/10/2021    Procedure: ENDOSCOPIC RETROGRADE CHOLANGIOPANCREATOGRAPHY with biliary dilation, stone removal, stent exchange;  Surgeon: Gregory Gabriel MD;  Location: [facility] OR    ENDOSCOPIC RETROGRADE CHOLANGIOPANCREATOGRAPHY, EXCHANGE TUBE/STENT N/A 06/10/2021    Procedure: ENDOSCOPIC RETROGRADE CHOLANGIOPANCREATOGRAPHY, WITH biliary stent exchange, stone removal;  Surgeon: Woodrow Lott MD;  Location: [facility] OR    ENDOSCOPIC RETROGRADE CHOLANGIOPANCREATOGRAPHY, EXCHANGE TUBE/STENT N/A 08/30/2021    Procedure: ENDOSCOPIC RETROGRADE CHOLANGIOPANCREATOGRAPHY with biliary dilation, stone removal, stent exchange;  Surgeon: Gregory Gabriel MD;  Location: [facility] OR    ENDOSCOPIC RETROGRADE CHOLANGIOPANCREATOGRAPHY, EXCHANGE TUBE/STENT N/A 10/01/2021    Procedure: ENDOSCOPIC RETROGRADE CHOLANGIOPANCREATOGRAPHY with balloon sweep of bile ducts, bile duct stent exchanged;  Surgeon: Gregory Gabriel MD;  Location: [facility] OR    ESOPHAGOSCOPY, GASTROSCOPY, DUODENOSCOPY (EGD), COMBINED N/A 09/04/2021    Procedure: ESOPHAGOGASTRODUODENOSCOPY (EGD);  Surgeon: Leventhal, Thomas Michael, MD;  Location: [facility] GI    ESOPHAGOSCOPY, GASTROSCOPY, DUODENOSCOPY (EGD), COMBINED N/A 10/01/2021    Procedure: ESOPHAGOGASTRODUODENOSCOPY (EGD) with varices banding;  Surgeon: Gregory Gabriel MD;  Location: [facility] OR    GYN SURGERY      bilat fallopian tubes and ovaries removed    IR LIVER BIOPSY PERCUTANEOUS  05/17/2022    PICC DOUBLE LUMEN PLACEMENT Right 05/08/2021    42cm (2cm external), Lateral brachial vein    RETURN LIVER TRANSPLANT N/A 11/19/2021    Procedure: CLOSURE, WOUND, ABDOMEN, SECONDARY, ABDOMINAL WASHOUT;  Surgeon: Ernesto Schmitt MD;  Location: [facility] OR    TRANSPLANT  11-    Liver  due to PSC    TRANSPLANT LIVER RECIPIENT  DONOR N/A 2021    Procedure: Opening of abdomen, Abdominal Exploration and aborted liver transplant.;  Surgeon: Ernesto Schmitt MD;  Location: [facility] OR    TRANSPLANT LIVER RECIPIENT  DONOR N/A 2021    Procedure: TRANSPLANT, LIVER, RECIPIENT,  DONOR;  Surgeon: Ernesto Schmitt MD;  Location: [facility] OR       Social History:   reports that she has never smoked. She has never used smokeless tobacco. She reports that she does not drink alcohol and does not use drugs.    Family History:  Family History   Problem Relation Age of Onset    Cancer Mother         angiosarcoma    Hypertension Mother     Hyperlipidemia Mother     Other Cancer Mother     Coronary Artery Disease Early Onset Father         MI age 46    Heart Transplant Father     Coronary Artery Disease Father     Melanoma Sister     Skin Cancer Sister     Liver Disease No family hx of     Ulcerative Colitis No family hx of     Crohn's Disease No family hx of     Glaucoma No family hx of     Macular Degeneration No family hx of        Medications:  Current Outpatient Medications   Medication Sig Dispense Refill    lisinopril (ZESTRIL) 10 MG tablet Take 1 tablet (10 mg) by mouth daily 90 tablet 3    multivitamin (CENTRUM SILVER) tablet Take 1 tablet by mouth daily      tacrolimus (GENERIC) 0.5 MG capsule Take 1 capsule (0.5 mg) by mouth every 12 hours Total dose: 1.5 mg  capsule 3    tacrolimus (GENERIC) 1 MG capsule Take 1 capsule (1 mg) by mouth every 12 hours Total dose: 1.5 mg  capsule 3    tacrolimus (PROTOPIC) 0.1 % external ointment Apply topically 2 times daily 60 g 1    nitroFURantoin macrocrystal-monohydrate (MACROBID) 100 MG capsule Take 1 capsule (100 mg) by mouth 2 times daily (Patient not taking: Reported on 2024) 10 capsule 0    sulfamethoxazole-trimethoprim (BACTRIM DS) 800-160 MG tablet Take 1 tablet by mouth 2 times daily (Patient not taking:  Reported on 4/11/2024) 6 tablet 0     Allergies   Allergen Reactions    Iodinated Contrast Media Hives     PATIENT HAD HIVE REACTION AFTER ADMINISTERING CT CONTRAST DYE.      Contrast Dye

## 2024-05-04 ENCOUNTER — OFFICE VISIT (OUTPATIENT)
Dept: OPHTHALMOLOGY | Facility: CLINIC | Age: 59
End: 2024-05-04
Payer: COMMERCIAL

## 2024-05-04 DIAGNOSIS — H43.812 PVD (POSTERIOR VITREOUS DETACHMENT), LEFT: ICD-10-CM

## 2024-05-04 DIAGNOSIS — H52.4 MYOPIA OF BOTH EYES WITH ASTIGMATISM AND PRESBYOPIA: ICD-10-CM

## 2024-05-04 DIAGNOSIS — H25.813 COMBINED FORMS OF AGE-RELATED CATARACT OF BOTH EYES: Primary | ICD-10-CM

## 2024-05-04 DIAGNOSIS — H52.203 MYOPIA OF BOTH EYES WITH ASTIGMATISM AND PRESBYOPIA: ICD-10-CM

## 2024-05-04 DIAGNOSIS — H52.13 MYOPIA OF BOTH EYES WITH ASTIGMATISM AND PRESBYOPIA: ICD-10-CM

## 2024-05-04 PROCEDURE — 92015 DETERMINE REFRACTIVE STATE: CPT | Performed by: STUDENT IN AN ORGANIZED HEALTH CARE EDUCATION/TRAINING PROGRAM

## 2024-05-04 PROCEDURE — 92004 COMPRE OPH EXAM NEW PT 1/>: CPT | Performed by: STUDENT IN AN ORGANIZED HEALTH CARE EDUCATION/TRAINING PROGRAM

## 2024-05-04 ASSESSMENT — CONF VISUAL FIELD
OD_SUPERIOR_NASAL_RESTRICTION: 0
OS_INFERIOR_NASAL_RESTRICTION: 0
OS_SUPERIOR_TEMPORAL_RESTRICTION: 0
METHOD: COUNTING FINGERS
OD_INFERIOR_NASAL_RESTRICTION: 0
OS_NORMAL: 1
OD_NORMAL: 1
OS_INFERIOR_TEMPORAL_RESTRICTION: 0
OS_SUPERIOR_NASAL_RESTRICTION: 0
OD_INFERIOR_TEMPORAL_RESTRICTION: 0
OD_SUPERIOR_TEMPORAL_RESTRICTION: 0

## 2024-05-04 ASSESSMENT — EXTERNAL EXAM - RIGHT EYE: OD_EXAM: NORMAL

## 2024-05-04 ASSESSMENT — REFRACTION_MANIFEST
OS_CYLINDER: +1.00
OS_AXIS: 088
OS_SPHERE: -0.25
OD_ADD: +3.00
OD_CYLINDER: +0.50
OD_SPHERE: -3.25
OD_AXIS: 108
OS_ADD: +3.00

## 2024-05-04 ASSESSMENT — REFRACTION_WEARINGRX
OS_AXIS: 049
OD_SPHERE: -2.50
SPECS_TYPE: PAL
OS_SPHERE: -0.25
OS_CYLINDER: +0.50
OD_ADD: +2.50
OS_ADD: +2.50
OD_AXIS: 078
OD_CYLINDER: +0.25

## 2024-05-04 ASSESSMENT — VISUAL ACUITY
OS_CC+: -3
OS_CC: 20/20
OS_PH_CC: 20/20
OD_CC+: -1
OS_PH_CC+: +1
METHOD: SNELLEN - LINEAR
OD_CC: 20/70
CORRECTION_TYPE: GLASSES
OD_PH_CC: 20/50
OD_PH_CC+: -2

## 2024-05-04 ASSESSMENT — TONOMETRY
OD_IOP_MMHG: 16
OS_IOP_MMHG: 16
IOP_METHOD: ICARE

## 2024-05-04 ASSESSMENT — SLIT LAMP EXAM - LIDS
COMMENTS: NORMAL
COMMENTS: NORMAL

## 2024-05-04 ASSESSMENT — EXTERNAL EXAM - LEFT EYE: OS_EXAM: NORMAL

## 2024-05-04 ASSESSMENT — CUP TO DISC RATIO
OD_RATIO: 0.15
OS_RATIO: 0.15

## 2024-05-04 NOTE — PROGRESS NOTES
HPI: Berta Nava is a 58 year old female that comes for her yearly ey exam. Patient reports that she has difficulty with reading, needs brighter lights to read, patient has difficulty driving at night.     Current ocular medications: None  Prior ocular medications: None    Ocular history:   - Myopia    Medical history:  - HTN currently on lisinopril 10 mg daily  - Liver transplant 11/2021 due PSC. Currently on tacrolimus 1.5 mg BID   - Ulcerative colitis     Review of systems: Does not report fatigue, weight loss, fevers, chills, night sweats, frequent headaches, seizure, fainting, numbness/tingling, weakness, oral ulcers, genital ulcers, recurrent nosebleeds, sinusitis, hearing loss, tinnitus, chronic cough, chest pain, shortness of breath, skin rash, tick bite, easy bruising, easy bleeding, joint pain/stiffness, back pain, recurrent diarrhea, bloody stools, bloody urine.    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     ATTESTATION     Attending Physician Attestation:      Complete documentation of historical and exam elements from today's encounter can be found in the full encounter summary report (not reduplicated in this progress note).  I personally obtained the chief complaint(s) and history of present illness.  I confirmed and edited as necessary the review of systems, past medical/surgical history, family history, social history, and examination findings as documented by others; and I examined the patient myself.  I personally reviewed the relevant tests,  images, and reports as documented above.  I formulated and edited as necessary the assessment and plan and discussed the findings and management plan with the patient and family    Scooter Rodriguez MD  PGY-5 Vitreo-retina surgery Fellow  Department of Ophthalmology   [facility]

## 2024-05-06 ENCOUNTER — TELEPHONE (OUTPATIENT)
Dept: OPHTHALMOLOGY | Facility: CLINIC | Age: 59
End: 2024-05-06
Payer: COMMERCIAL

## 2024-05-13 ENCOUNTER — OFFICE VISIT (OUTPATIENT)
Dept: OPHTHALMOLOGY | Facility: CLINIC | Age: 59
End: 2024-05-13
Attending: OPHTHALMOLOGY
Payer: COMMERCIAL

## 2024-05-13 DIAGNOSIS — H25.813 COMBINED FORMS OF AGE-RELATED CATARACT OF BOTH EYES: Primary | ICD-10-CM

## 2024-05-13 PROCEDURE — 92025 CPTRIZED CORNEAL TOPOGRAPHY: CPT | Performed by: OPHTHALMOLOGY

## 2024-05-13 PROCEDURE — 76519 ECHO EXAM OF EYE: CPT | Performed by: OPHTHALMOLOGY

## 2024-05-13 PROCEDURE — 99204 OFFICE O/P NEW MOD 45 MIN: CPT | Performed by: OPHTHALMOLOGY

## 2024-05-13 PROCEDURE — 99213 OFFICE O/P EST LOW 20 MIN: CPT | Performed by: OPHTHALMOLOGY

## 2024-05-13 ASSESSMENT — SLIT LAMP EXAM - LIDS
COMMENTS: NORMAL
COMMENTS: NORMAL

## 2024-05-13 ASSESSMENT — REFRACTION_WEARINGRX
OS_SPHERE: -0.25
OD_CYLINDER: +0.25
OS_ADD: +2.50
OD_AXIS: 078
OS_CYLINDER: +0.50
SPECS_TYPE: PAL
OD_SPHERE: -2.50
OS_AXIS: 049
OD_ADD: +2.50

## 2024-05-13 ASSESSMENT — EXTERNAL EXAM - RIGHT EYE: OD_EXAM: NORMAL

## 2024-05-13 ASSESSMENT — VISUAL ACUITY
METHOD: SNELLEN - LINEAR
OS_CC+: -1
OD_PH_CC: 20/50
OD_CC: 20/100
OS_CC: 20/20
CORRECTION_TYPE: GLASSES

## 2024-05-13 ASSESSMENT — TONOMETRY
OD_IOP_MMHG: 19
OS_IOP_MMHG: 18
IOP_METHOD: ICARE

## 2024-05-13 ASSESSMENT — EXTERNAL EXAM - LEFT EYE: OS_EXAM: NORMAL

## 2024-05-13 NOTE — PROGRESS NOTES
"Chief complaint   CATARACT EVAL    HPI    Berta Nava 58 year old female       Chief Complaint(s) and History of Present Illness(es)       Cataract    In both eyes.  Associated symptoms include blurred vision, glare and haloes.  Negative for double vision and a need for brighter lights.  Duration of years.  Treatments tried include artificial tears and glasses. Additional comments: Referral from Dr. Cuevas for evaluation of cataract each eye.     Patient notes she is struggling with reading the Sunday crossword in the newspaper. Patient may be interested in monovision. She currently has monovision. Patient notes dryness each eye. \"It may be seasonal. I use Refresh drops.\"     Mabel Lord, COT 9:32 AM 05/13/2024                     Past ocular history   Prior eye surgery/laser/Trauma: -  CTL wearer:No  Glasses : -  Family Hx of eye disease: -    PMH     Past Medical History:   Diagnosis Date    Ascites     Biliary cirrhosis (H)     Cholangitis, sclerosing (H28)     Cirrhosis of liver with ascites (H) 03/03/2021    Hearing loss of left ear     wears a hearing aide    History of blood transfusion     History of low potassium     Hyperlipidemia     Hypertension     Liver transplant rejection (H) 05/17/2022    Mild Rejection    SBP (spontaneous bacterial peritonitis) (H) 04/30/2021    Sjogren's syndrome (H24)     Ulcerative colitis (H)     Ulcerative pancolitis (H)        PSH     Past Surgical History:   Procedure Laterality Date    BENCH LIVER N/A 11/18/2021    Procedure: Bench liver;  Surgeon: Ernesto Schmitt MD;  Location: [facility] OR    COLONOSCOPY N/A 06/08/2022    Procedure: COLONOSCOPY, WITH BIOPSY;  Surgeon: Leventhal, Thomas Michael, MD;  Location: [facility] OR    COLONOSCOPY N/A 10/31/2022    Procedure: COLONOSCOPY, WITH POLYPECTOMY AND BIOPSY;  Surgeon: Kendrick Crawford MD;  Location: [facility] OR    COLONOSCOPY N/A 10/31/2023    Procedure: Colonoscopy with biopsy and polypectomy;  Surgeon: Ty, " Kendrick Cadena MD;  Location: [facility] OR    COMBINED ESOPHAGOSCOPY, GASTROSCOPY, DUODENOSCOPY (EGD), REMOVE BILIARY STENT N/A 02/09/2022    Procedure: ESOPHAGOGASTRODUODENOSCOPY, WITH BILIARY STENT REMOVAL;  Surgeon: Leventhal, Thomas Michael, MD;  Location: [facility] OR    CV CORONARY ANGIOGRAM N/A 08/25/2021    Procedure: Coronary Angiogram with possible intervention;  Surgeon: David Wilhelm MD;  Location:  [facility]    ENDOSCOPIC RETROGRADE CHOLANGIOPANCREATOGRAM N/A 06/25/2021    Procedure: ENDOSCOPIC RETROGRADE CHOLANGIOPANCREATOGRAPHY with ballon sweep of bile ducts for stones, balloon dilation of bile ducts and bile duct stent placement;  Surgeon: Gregory Gabriel MD;  Location:  OR    ENDOSCOPIC RETROGRADE CHOLANGIOPANCREATOGRAM N/A 07/22/2021    Procedure: ENDOSCOPIC RETROGRADE CHOLANGIOPANCREATOGRAPHY STONE REMOVAL, DILATION AND STENT PLACEMENT;  Surgeon: Gregory Gabriel MD;  Location:  OR    ENDOSCOPIC RETROGRADE CHOLANGIOPANCREATOGRAPHY, EXCHANGE TUBE/STENT N/A 05/05/2021    Procedure: ENDOSCOPIC RETROGRADE CHOLANGIOPANCREATOGRAPHY WITH STENT EXCHANGE, STONE EXTRACTION, AND DILATION;  Surgeon: Gregory Gabriel MD;  [facility]    ENDOSCOPIC RETROGRADE CHOLANGIOPANCREATOGRAPHY, EXCHANGE TUBE/STENT N/A 05/10/2021    Procedure: ENDOSCOPIC RETROGRADE CHOLANGIOPANCREATOGRAPHY with biliary dilation, stone removal, stent exchange;  Surgeon: Gregory Gabriel MD;  Location: [facility] OR    ENDOSCOPIC RETROGRADE CHOLANGIOPANCREATOGRAPHY, EXCHANGE TUBE/STENT N/A 06/10/2021    Procedure: ENDOSCOPIC RETROGRADE CHOLANGIOPANCREATOGRAPHY, WITH biliary stent exchange, stone removal;  Surgeon: Woodrow Lott MD;  Location: [facility] OR    ENDOSCOPIC RETROGRADE CHOLANGIOPANCREATOGRAPHY, EXCHANGE TUBE/STENT N/A 08/30/2021    Procedure: ENDOSCOPIC RETROGRADE CHOLANGIOPANCREATOGRAPHY with biliary dilation, stone removal, stent exchange;  Surgeon: Gregory Gabriel MD;  Location:  OR    ENDOSCOPIC RETROGRADE  CHOLANGIOPANCREATOGRAPHY, EXCHANGE TUBE/STENT N/A 10/01/2021    Procedure: ENDOSCOPIC RETROGRADE CHOLANGIOPANCREATOGRAPHY with balloon sweep of bile ducts, bile duct stent exchanged;  Surgeon: Gregory Gabriel MD;  Location: [facility] OR    ESOPHAGOSCOPY, GASTROSCOPY, DUODENOSCOPY (EGD), COMBINED N/A 2021    Procedure: ESOPHAGOGASTRODUODENOSCOPY (EGD);  Surgeon: Leventhal, Thomas Michael, MD;  Location: [facility] GI    ESOPHAGOSCOPY, GASTROSCOPY, DUODENOSCOPY (EGD), COMBINED N/A 10/01/2021    Procedure: ESOPHAGOGASTRODUODENOSCOPY (EGD) with varices banding;  Surgeon: Gregory Gabriel MD;  Location: [facility] OR    GYN SURGERY      bilat fallopian tubes and ovaries removed    IR ERCP  2020    IR ERCP  2021    IR LIVER BIOPSY PERCUTANEOUS  2022    PICC DOUBLE LUMEN PLACEMENT Right 2021    42cm (2cm external), Lateral brachial vein    RETURN LIVER TRANSPLANT N/A 2021    Procedure: CLOSURE, WOUND, ABDOMEN, SECONDARY, ABDOMINAL WASHOUT;  Surgeon: Ernesto Schmitt MD;  Location: [facility] OR    TRANSPLANT  2021    Liver due to PSC    TRANSPLANT LIVER RECIPIENT  DONOR N/A 2021    Procedure: Opening of abdomen, Abdominal Exploration and aborted liver transplant.;  Surgeon: Ernesto Schmitt MD;  Location: [facility] OR    TRANSPLANT LIVER RECIPIENT  DONOR N/A 2021    Procedure: TRANSPLANT, LIVER, RECIPIENT,  DONOR;  Surgeon: Ernesto Schmitt MD;  Location: [facility] OR       Meds     Current Outpatient Medications   Medication Sig Dispense Refill    lisinopril (ZESTRIL) 10 MG tablet Take 1 tablet (10 mg) by mouth daily 90 tablet 3    multivitamin (CENTRUM SILVER) tablet Take 1 tablet by mouth daily      tacrolimus (GENERIC) 0.5 MG capsule Take 1 capsule (0.5 mg) by mouth every 12 hours Total dose: 1.5 mg  capsule 3    tacrolimus (GENERIC) 1 MG capsule Take 1 capsule (1 mg) by mouth every 12 hours Total dose: 1.5 mg  capsule 3    tacrolimus (PROTOPIC) 0.1 % external  ointment Apply topically 2 times daily 60 g 1     No current facility-administered medications for this visit.       Labs   -    Imaging   -    Drops Currently Taking   -    Assessment/Plan 05/13/2024   #Senile Cataracts right eye>OS  Patient is having difficulty with daily activities due to visual impairment. Patient feels that they are no longer managing with current correction and would like to move forward with cataract surgery. Explained benefits alternatives and risks including but not limited to loss of vision, severe infection, retinal detachment, need for more surgery, visual disturbances etc...  Informed patient that reaching uncorrected vision aim (without glasses) after cataract surgery is not certain. Made patient aware they may need glasses, laser vision correction or in worst case scenario, IOL exchange.      Dilates well  no PXF  no Flomax  no guttae    -Aim: near right eye -2, distance left eye(os will not be scheduled yet)  - dominant eye  -Previous refractive surgery status:-  -Corneal astigmatism>1  Patient is okay to use toric lens OD  Start moxi after  Patient is s/p liver transplant      Follow up:  Oph:after sx      Attending Physician Attestation:  Complete documentation of historical and exam elements from today's encounter can be found in the full encounter summary report (not reduplicated in this progress note).  I personally obtained the chief complaint(s) and history of present illness.  I confirmed and edited as necessary the review of systems, past medical/surgical history, family history, social history, and examination findings as documented by others; and I examined the patient myself.  I personally reviewed the relevant tests, images, and reports as documented above.  I formulated and edited as necessary the assessment and plan and discussed the findings and management plan with the patient and family. - Dory Palmer MD

## 2024-05-13 NOTE — NURSING NOTE
"Chief Complaints and History of Present Illnesses   Patient presents with    Cataract     Referral from Dr. Cuevas for evaluation of cataract each eye.     Patient notes she is struggling with reading the Sunday crossword in the newspaper. Patient may be interested in monovision. She currently has monovision. Patient notes dryness each eye. \"It may be seasonal. I use Refresh drops.\"     Mabel Lord, COT 9:32 AM 05/13/2024       Chief Complaint(s) and History of Present Illness(es)       Cataract              Laterality: both eyes    Associated symptoms: blurred vision, glare and haloes.  Negative for double vision and a need for brighter lights    Duration: years    Treatments tried: artificial tears and glasses    Comments: Referral from Dr. Cuevas for evaluation of cataract each eye.     Patient notes she is struggling with reading the Sunday crossword in the newspaper. Patient may be interested in monovision. She currently has monovision. Patient notes dryness each eye. \"It may be seasonal. I use Refresh drops.\"     Mabel Lord, COT 9:32 AM 05/13/2024                      "

## 2024-05-14 ENCOUNTER — TELEPHONE (OUTPATIENT)
Dept: OPHTHALMOLOGY | Facility: CLINIC | Age: 59
End: 2024-05-14
Payer: COMMERCIAL

## 2024-05-14 PROBLEM — H25.813 COMBINED FORMS OF AGE-RELATED CATARACT OF BOTH EYES: Status: ACTIVE | Noted: 2024-05-13

## 2024-05-14 NOTE — TELEPHONE ENCOUNTER
Called patient to schedule surgery with Dr Palmer    Spoke with: patient    Date of Surgery: 7/11 (Right)     Patient aware of approximate arrival time: No      Location of surgery: [facility] (Right)      Pre-Op H&P: [facility]     Informed patient that they need to call to schedule pre-op H&P with [facility]  within 30 days of surgery date: Yes    Post-Op Appt Dates: 7/12, 7/31     Discussed with patient pre-op RN will call 2-3 days prior to surgery with arrival time and instructions:  Yes    Discussed with patient PAC RN will provide arrival time and instructions for surgery at the time of the appointment: [[place] locations only]: Not Applicable      Standard Surgery Packet Sent: Yes 05/14/24  via [employer] Message      Surgical Soap Discussed with Patient: No      Additional Information Sent in Packet: Post-op Appointment Itinerary      Informed patient that they will need an adult  to bring patient home from surgery: Yes  : Patient is aware of need for adult          Additional Comments:  Requested quote be sent to patient for Toric lens.      All patients questions were answered and was instructed to review surgical packet and call back [phone] with any questions or concerns.       Isabel Kenney on 5/14/2024 at 3:12 PM

## 2024-05-16 ENCOUNTER — MYC MEDICAL ADVICE (OUTPATIENT)
Dept: INTERNAL MEDICINE | Facility: CLINIC | Age: 59
End: 2024-05-16
Payer: COMMERCIAL

## 2024-05-16 NOTE — TELEPHONE ENCOUNTER
Unfortunately these do need to be 2 separate visits for insurance purposes and as they do cover different things.  If she wanted, she could consider moving the preventative visit out a few months since she is seeing me for the preop soon.  It is up to her.

## 2024-05-16 NOTE — TELEPHONE ENCOUNTER
Please see patient's mychart message below.      Please advise, thanks.    Ed Flanagan, Triage RN [place] [place]  1:13 PM 5/16/2024

## 2024-05-20 SDOH — HEALTH STABILITY: PHYSICAL HEALTH: ON AVERAGE, HOW MANY MINUTES DO YOU ENGAGE IN EXERCISE AT THIS LEVEL?: 60 MIN

## 2024-05-20 SDOH — HEALTH STABILITY: PHYSICAL HEALTH: ON AVERAGE, HOW MANY DAYS PER WEEK DO YOU ENGAGE IN MODERATE TO STRENUOUS EXERCISE (LIKE A BRISK WALK)?: 5 DAYS

## 2024-05-20 ASSESSMENT — SOCIAL DETERMINANTS OF HEALTH (SDOH): HOW OFTEN DO YOU GET TOGETHER WITH FRIENDS OR RELATIVES?: ONCE A WEEK

## 2024-05-21 ENCOUNTER — OFFICE VISIT (OUTPATIENT)
Dept: INTERNAL MEDICINE | Facility: CLINIC | Age: 59
End: 2024-05-21
Payer: COMMERCIAL

## 2024-05-21 VITALS
DIASTOLIC BLOOD PRESSURE: 80 MMHG | SYSTOLIC BLOOD PRESSURE: 133 MMHG | TEMPERATURE: 98.4 F | OXYGEN SATURATION: 100 % | WEIGHT: 171 LBS | BODY MASS INDEX: 28.49 KG/M2 | RESPIRATION RATE: 16 BRPM | HEIGHT: 65 IN | HEART RATE: 76 BPM

## 2024-05-21 DIAGNOSIS — Z00.00 ENCOUNTER FOR PREVENTIVE HEALTH EXAMINATION: Primary | ICD-10-CM

## 2024-05-21 DIAGNOSIS — E78.5 HYPERLIPIDEMIA LDL GOAL <130: ICD-10-CM

## 2024-05-21 DIAGNOSIS — K51.019 ULCERATIVE PANCOLITIS WITH COMPLICATION (H): ICD-10-CM

## 2024-05-21 DIAGNOSIS — I10 ESSENTIAL HYPERTENSION: ICD-10-CM

## 2024-05-21 DIAGNOSIS — Z94.4 S/P LIVER TRANSPLANT (H): ICD-10-CM

## 2024-05-21 PROCEDURE — 99214 OFFICE O/P EST MOD 30 MIN: CPT | Mod: 25

## 2024-05-21 PROCEDURE — 99396 PREV VISIT EST AGE 40-64: CPT

## 2024-05-21 RX ORDER — LISINOPRIL 10 MG/1
10 TABLET ORAL DAILY
Qty: 90 TABLET | Refills: 3 | Status: SHIPPED | OUTPATIENT
Start: 2024-05-21

## 2024-05-21 NOTE — PROGRESS NOTES
"Preventive Care Visit  [facility]  CHELSEY Cloud CNP, Internal Medicine  May 21, 2024      Assessment & Plan     (Z00.00) Encounter for preventive health examination  (primary encounter diagnosis)  Comment: pt presents for annual exam.  She does complain of some discomfort of her right ear upon exam it appears she has some fluid behind the eardrum. We did discuss using Flonase and an antihistamine.  Otherwise we did discuss this often resolves spontaneously  Plan:     (E78.5) Hyperlipidemia LDL goal <130    Current ASCVD risk score is 4.9%.  Recent LDL was 168 in February.  Father had MI in his 40's. Also had underlying CAD. He had CABG in his 40's and then had a heart transplant at age 59.  With ASCVD risk score between 5-7% and family history of heart disease and MI, I would like her to have coronary calcium CT scan done which she is agreeable to do      Comment: The 10-year ASCVD risk score (Brandee RAMIREZ, et al., 2019) is: 4.9%    Values used to calculate the score:      Age: 58 years      Sex: Female      Is Non- : No      Diabetic: No      Tobacco smoker: No      Systolic Blood Pressure: 133 mmHg      Is BP treated: Yes      HDL Cholesterol: 69 mg/dL      Total Cholesterol: 253 mg/dL        Plan: CT Coronary Calcium Scan            (I10) Essential hypertension  Comment: Blood pressure well-controlled at 133/80, continue lisinopril 10 mg  Plan: lisinopril (ZESTRIL) 10 MG tablet            (K51.019) Ulcerative pancolitis with complication (H)  Comment: follows with GI - has colonoscopies done yearly   Plan:     (Z94.4) S/P liver transplant (H)  Comment: s/p liver transplant in November of 2021 for history of primary sclerosing cholangitis d/t ulcerative colitis   Plan:             BMI  Estimated body mass index is 28.68 kg/m  as calculated from the following:    Height as of this encounter: 1.645 m (5' 4.75\").    Weight as of this encounter: 77.6 kg (171 lb). "   Weight management plan: Discussed healthy diet and exercise guidelines    Counseling  Appropriate preventive services were discussed with this patient, including applicable screening as appropriate for fall prevention, nutrition, physical activity, Tobacco-use cessation, weight loss and cognition.  Checklist reviewing preventive services available has been given to the patient.  Reviewed patient's diet, addressing concerns and/or questions.         Annette Hampton is a 58 year old, presenting for the following:  Physical        5/21/2024    12:59 PM   Additional Questions   Roomed by Griselda        Health Care Directive  Patient has a Health Care Directive on file  Advance care planning document is on file and is current.    HPI              5/20/2024   General Health   How would you rate your overall physical health? Good   Feel stress (tense, anxious, or unable to sleep) To some extent   (!) STRESS CONCERN      5/20/2024   Nutrition   Three or more servings of calcium each day? Yes   Diet: Regular (no restrictions)   How many servings of fruit and vegetables per day? (!) 2-3   How many sweetened beverages each day? 0-1         5/20/2024   Exercise   Days per week of moderate/strenous exercise 5 days   Average minutes spent exercising at this level 60 min         5/20/2024   Social Factors   Frequency of gathering with friends or relatives Once a week   Worry food won't last until get money to buy more No   Food not last or not have enough money for food? No   Do you have housing?  Yes   Are you worried about losing your housing? No   Lack of transportation? No   Unable to get utilities (heat,electricity)? No         5/20/2024   Fall Risk   Fallen 2 or more times in the past year? No   Trouble with walking or balance? No          5/20/2024   Dental   Dentist two times every year? Yes              Today's PHQ-2 Score:       4/11/2024     3:18 PM   PHQ-2 ( 1999 [employer])   Q1: Little interest or pleasure in doing  things 0   Q2: Feeling down, depressed or hopeless 0   PHQ-2 Score 0         5/20/2024   Substance Use   Alcohol more than 3/day or more than 7/wk Not Applicable   Do you use any other substances recreationally? No     Social History     Tobacco Use    Smoking status: Never    Smokeless tobacco: Never   Vaping Use    Vaping status: Never Used   Substance Use Topics    Alcohol use: No     Comment: Last drink was 2017    Drug use: No           5/24/2023   LAST FHS-7 RESULTS   1st degree relative breast or ovarian cancer No   Any relative bilateral breast cancer No   Any male have breast cancer No   Any ONE woman have BOTH breast AND ovarian cancer No   Any woman with breast cancer before 50yrs No   2 or more relatives with breast AND/OR ovarian cancer No   2 or more relatives with breast AND/OR bowel cancer No        Mammogram Screening - Mammogram every 1-2 years updated in Health Maintenance based on mutual decision making        5/20/2024   STI Screening   New sexual partner(s) since last STI/HIV test? No     History of abnormal Pap smear: No - age 30- 64 PAP with HPV every 5 years recommended        8/13/2020     9:08 AM   PAP / HPV   PAP-ABSTRACT See Scanned Document           This result is from an external source.     ASCVD Risk   The 10-year ASCVD risk score (Brandee DK, et al., 2019) is: 4.9%    Values used to calculate the score:      Age: 58 years      Sex: Female      Is Non- : No      Diabetic: No      Tobacco smoker: No      Systolic Blood Pressure: 133 mmHg      Is BP treated: Yes      HDL Cholesterol: 69 mg/dL      Total Cholesterol: 253 mg/dL           Reviewed and updated as needed this visit by Provider                      Review of Systems  CONSTITUTIONAL: NEGATIVE for fever, chills, change in weight  RESP: NEGATIVE for significant cough or SOB  CV: NEGATIVE for chest pain, palpitations or peripheral edema  GI: NEGATIVE for hematochezia        Objective    Exam  BP  "133/80   Pulse 76   Temp 98.4  F (36.9  C) (Tympanic)   Resp 16   Ht 1.645 m (5' 4.75\")   Wt 77.6 kg (171 lb)   LMP  (LMP Unknown)   SpO2 100%   BMI 28.68 kg/m     Estimated body mass index is 28.68 kg/m  as calculated from the following:    Height as of this encounter: 1.645 m (5' 4.75\").    Weight as of this encounter: 77.6 kg (171 lb).      Physical Exam  Constitutional:       General: She is not in acute distress.     Appearance: Normal appearance. She is not ill-appearing, toxic-appearing or diaphoretic.   HENT:      Head: Normocephalic and atraumatic.   Eyes:      Conjunctiva/sclera: Conjunctivae normal.   Cardiovascular:      Rate and Rhythm: Normal rate and regular rhythm.      Heart sounds: Normal heart sounds.   Pulmonary:      Effort: Pulmonary effort is normal.      Breath sounds: Normal breath sounds.   Skin:     General: Skin is warm and dry.   Neurological:      Mental Status: She is alert and oriented to person, place, and time.   Psychiatric:         Mood and Affect: Mood normal.         Behavior: Behavior normal.         Thought Content: Thought content normal.         Judgment: Judgment normal.           Signed Electronically by: CHELSEY Cloud CNP    "

## 2024-05-23 ENCOUNTER — MYC MEDICAL ADVICE (OUTPATIENT)
Dept: INTERNAL MEDICINE | Facility: CLINIC | Age: 59
End: 2024-05-23
Payer: COMMERCIAL

## 2024-05-23 DIAGNOSIS — H66.001 NON-RECURRENT ACUTE SUPPURATIVE OTITIS MEDIA OF RIGHT EAR WITHOUT SPONTANEOUS RUPTURE OF TYMPANIC MEMBRANE: Primary | ICD-10-CM

## 2024-05-23 NOTE — TELEPHONE ENCOUNTER
Pt saw Afshan on 5/21.   See her [employer] message, Please advise if needs to be seen again, or needs Rx?

## 2024-05-28 NOTE — TELEPHONE ENCOUNTER
See her new message. She should be finishing antibiotics today.   Please advise if needs to come in for ear check?

## 2024-05-28 NOTE — TELEPHONE ENCOUNTER
I am hesitant to prescribe another round of antibiotics for her as I did not feel she had a definitive ear infection in the first place as there was no evidence of otitis media present upon exam when I saw her in office.  I would recommend we hold off and she continue with using over-the-counter antihistamine such as cetirizine, Flonase nasal spray, and Tylenol as needed for an additional 5 to 7 days prior to us reconsidering repeat antibiotics.  I would recommend if symptoms do not start to slowly improve over the next 5 days that should be seen again in person by someone to evaluate the ear

## 2024-05-29 ENCOUNTER — HOSPITAL ENCOUNTER (OUTPATIENT)
Dept: MAMMOGRAPHY | Facility: CLINIC | Age: 59
Discharge: HOME | End: 2024-05-29
Payer: COMMERCIAL

## 2024-05-29 ENCOUNTER — LAB (OUTPATIENT)
Dept: LAB | Facility: CLINIC | Age: 59
End: 2024-05-29
Payer: COMMERCIAL

## 2024-05-29 DIAGNOSIS — Z94.4 LIVER REPLACED BY TRANSPLANT (H): ICD-10-CM

## 2024-05-29 DIAGNOSIS — Z12.31 VISIT FOR SCREENING MAMMOGRAM: ICD-10-CM

## 2024-05-29 LAB
ALBUMIN SERPL BCG-MCNC: 4.4 G/DL (ref 3.5–5.2)
ALP SERPL-CCNC: 51 U/L (ref 40–150)
ALT SERPL W P-5'-P-CCNC: 17 U/L (ref 0–50)
ANION GAP SERPL CALCULATED.3IONS-SCNC: 10 MMOL/L (ref 7–15)
AST SERPL W P-5'-P-CCNC: 23 U/L (ref 0–45)
BILIRUB DIRECT SERPL-MCNC: <0.2 MG/DL (ref 0–0.3)
BILIRUB SERPL-MCNC: 0.3 MG/DL
BUN SERPL-MCNC: 18.3 MG/DL (ref 8–23)
CALCIUM SERPL-MCNC: 9.6 MG/DL (ref 8.8–10.2)
CHLORIDE SERPL-SCNC: 105 MMOL/L (ref 98–107)
CREAT SERPL-MCNC: 0.8 MG/DL (ref 0.51–0.95)
DEPRECATED HCO3 PLAS-SCNC: 24 MMOL/L (ref 22–29)
EGFRCR SERPLBLD CKD-EPI 2021: 84 ML/MIN/1.73M2
ERYTHROCYTE [DISTWIDTH] IN BLOOD BY AUTOMATED COUNT: 13.6 % (ref 10–15)
GLUCOSE SERPL-MCNC: 87 MG/DL (ref 70–99)
HCT VFR BLD AUTO: 38.5 % (ref 35–47)
HGB BLD-MCNC: 13.1 G/DL (ref 11.7–15.7)
MCH RBC QN AUTO: 28.9 PG (ref 26.5–33)
MCHC RBC AUTO-ENTMCNC: 34 G/DL (ref 31.5–36.5)
MCV RBC AUTO: 85 FL (ref 78–100)
PLATELET # BLD AUTO: 207 10E3/UL (ref 150–450)
POTASSIUM SERPL-SCNC: 4.3 MMOL/L (ref 3.4–5.3)
PROT SERPL-MCNC: 7.3 G/DL (ref 6.4–8.3)
RBC # BLD AUTO: 4.54 10E6/UL (ref 3.8–5.2)
SODIUM SERPL-SCNC: 139 MMOL/L (ref 135–145)
TACROLIMUS BLD-MCNC: 3.6 UG/L (ref 5–15)
TME LAST DOSE: ABNORMAL H
TME LAST DOSE: ABNORMAL H
WBC # BLD AUTO: 7.5 10E3/UL (ref 4–11)

## 2024-05-29 PROCEDURE — 80197 ASSAY OF TACROLIMUS: CPT

## 2024-05-29 PROCEDURE — 36415 COLL VENOUS BLD VENIPUNCTURE: CPT

## 2024-05-29 PROCEDURE — 77067 SCR MAMMO BI INCL CAD: CPT

## 2024-05-29 PROCEDURE — 82248 BILIRUBIN DIRECT: CPT

## 2024-05-29 PROCEDURE — 85027 COMPLETE CBC AUTOMATED: CPT

## 2024-05-29 PROCEDURE — 80053 COMPREHEN METABOLIC PANEL: CPT

## 2024-06-21 ENCOUNTER — OFFICE VISIT (OUTPATIENT)
Dept: INTERNAL MEDICINE | Facility: CLINIC | Age: 59
End: 2024-06-21
Payer: COMMERCIAL

## 2024-06-21 VITALS
RESPIRATION RATE: 14 BRPM | SYSTOLIC BLOOD PRESSURE: 112 MMHG | DIASTOLIC BLOOD PRESSURE: 78 MMHG | OXYGEN SATURATION: 99 % | BODY MASS INDEX: 28.32 KG/M2 | HEIGHT: 65 IN | TEMPERATURE: 98.4 F | HEART RATE: 87 BPM | WEIGHT: 170 LBS

## 2024-06-21 DIAGNOSIS — Z01.818 PREOP GENERAL PHYSICAL EXAM: Primary | ICD-10-CM

## 2024-06-21 DIAGNOSIS — H25.9 SENILE CATARACT OF RIGHT EYE, UNSPECIFIED AGE-RELATED CATARACT TYPE: ICD-10-CM

## 2024-06-21 DIAGNOSIS — Z94.4 S/P LIVER TRANSPLANT (H): ICD-10-CM

## 2024-06-21 DIAGNOSIS — I10 ESSENTIAL HYPERTENSION: ICD-10-CM

## 2024-06-21 PROCEDURE — 99214 OFFICE O/P EST MOD 30 MIN: CPT

## 2024-06-21 PROCEDURE — G2211 COMPLEX E/M VISIT ADD ON: HCPCS

## 2024-06-21 NOTE — PROGRESS NOTES
Preoperative Evaluation  [place] [address]  [address]  [facility] [address]  Phone: [phone]  Primary Provider: CHELSEY Cloud CNP  Pre-op Performing Provider: CHELSEY Cloud CNP  Jun 21, 2024 6/16/2024   Surgical Information   What procedure is being done? Cataract surgery right eye   Facility or Hospital where procedure/surgery will be performed: [facility]   Who is doing the procedure / surgery? Dr Palmer   Date of surgery / procedure: 07/11/24   Time of surgery / procedure: Tbd   Where do you plan to recover after surgery? at home with family        Fax number for surgical facility: Note does not need to be faxed, will be available electronically in Epic.    Assessment & Plan     The proposed surgical procedure is considered LOW risk.    (Z01.818) Preop general physical exam  (primary encounter diagnosis)  Comment: Okay to proceed with procedure as planned  Plan:     (H25.9) Senile cataract of right eye, unspecified age-related cataract type  Comment: Patient with senile cataract of right eye  Plan:     (I10) Essential hypertension  Comment: Blood pressure under excellent control today 112/78.  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  She has history of ulcer colitis.  Continues on tacrolimus  Plan:     The longitudinal plan of care for the diagnosis(es)/condition(s) as documented were addressed during this visit. Due to the added complexity in care, I will continue to support Berta in the subsequent management and with ongoing continuity of care.       - No identified additional risk factors other than previously addressed         Recommendation  Approval given to proceed with proposed procedure, without further diagnostic evaluation.    Subjective   Berta is a 58 year old, presenting for the following:  Pre-Op Exam           6/21/2024     9:04 AM   Additional Questions   Roomed by Agata Kay MA   Accompanied by Herself     HPI related to upcoming procedure:         6/16/2024   Pre-Op Questionnaire   Have you ever had a heart attack or stroke? No   Have you ever had surgery on your heart or blood vessels, such as a stent placement, a coronary artery bypass, or surgery on an artery in your head, neck, heart, or legs? No   Do you have chest pain with activity? No   Do you have a history of heart failure? No   Do you currently have a cold, bronchitis or symptoms of other infection? No   Do you have a cough, shortness of breath, or wheezing? No   Do you or anyone in your family have previous history of blood clots? No   Do you or does anyone in your family have a serious bleeding problem such as prolonged bleeding following surgeries or cuts? No   Have you ever had problems with anemia or been told to take iron pills? No   Have you had any abnormal blood loss such as black, tarry or bloody stools, or abnormal vaginal bleeding? No   Have you ever had a blood transfusion? (!) YES- no s/e    Have you ever had a transfusion reaction? No   Are you willing to have a blood transfusion if it is medically needed before, during, or after your surgery? Yes   Have you or any of your relatives ever had problems with anesthesia? No   Do you have sleep apnea, excessive snoring or daytime drowsiness? No   Do you have any artifical heart valves or other implanted medical devices like a pacemaker, defibrillator, or continuous glucose monitor? No   Do you have artificial joints? No   Are you allergic to latex? No        Health Care Directive  Patient has a Health Care Directive on file      Preoperative Review of    reviewed - no record of controlled substances prescribed.          Patient Active Problem List    Diagnosis Date Noted    Combined forms of age-related cataract of both eyes 05/13/2024     Priority: Medium    Liver transplant rejection (H)  05/17/2022     Priority: Medium     Mild Rejection      Anemia due to chronic kidney disease 11/30/2021     Priority: Medium    Immunosuppressed status (H24) 11/29/2021     Priority: Medium    S/P liver transplant (H) 11/29/2021     Priority: Medium    Acute kidney failure, unspecified (H24) 09/02/2021     Priority: Medium    Primary sclerosing cholangitis (H28) 07/01/2021     Priority: Medium     Added automatically from request for surgery [id number]      Ulcerative pancolitis with complication (H) 06/12/2019     Priority: Medium    Hypokalemia 05/13/2018     Priority: Medium    Sicca syndrome (H24) 12/11/2017     Priority: Medium    Hyperlipidemia 02/23/2012     Priority: Medium    Mixed hearing loss, bilateral 01/07/2009     Priority: Medium    Neoplasm of uncertain behavior of skin 01/26/2007     Priority: Medium     Overview:   LW Modifier:  moderate DN- left buttock  ; Dysplastic Nevus  Overview:   LW Modifier:  recurrent- right upper back  ; Dysplastic Nevus      Essential hypertension 06/07/2003     Priority: Medium     Overview:   Hypertension        Past Medical History:   Diagnosis Date    Ascites     Biliary cirrhosis (H)     Cholangitis, sclerosing (H28)     Cirrhosis of liver with ascites (H) 03/03/2021    Hearing loss of left ear     wears a hearing aide    History of blood transfusion     History of low potassium     Hyperlipidemia     Hypertension     Liver transplant rejection (H) 05/17/2022    Mild Rejection    SBP (spontaneous bacterial peritonitis) (H) 04/30/2021    Sjogren's syndrome (H24)     Ulcerative colitis (H)     Ulcerative pancolitis (H)      Past Surgical History:   Procedure Laterality Date    BENCH LIVER N/A 11/18/2021    Procedure: Bench liver;  Surgeon: Ernesto Schmitt MD;  Location: [facility] OR    COLONOSCOPY N/A 06/08/2022    Procedure: COLONOSCOPY, WITH BIOPSY;  Surgeon: Leventhal, Thomas Michael, MD;  Location: [facility] OR    COLONOSCOPY N/A 10/31/2022    Procedure: COLONOSCOPY, WITH  POLYPECTOMY AND BIOPSY;  Surgeon: Kendrick Crawford MD;  Location: [facility] OR    COLONOSCOPY N/A 10/31/2023    Procedure: Colonoscopy with biopsy and polypectomy;  Surgeon: Kendrick Crawford MD;  Location: [facility] OR    COMBINED ESOPHAGOSCOPY, GASTROSCOPY, DUODENOSCOPY (EGD), REMOVE BILIARY STENT N/A 02/09/2022    Procedure: ESOPHAGOGASTRODUODENOSCOPY, WITH BILIARY STENT REMOVAL;  Surgeon: Leventhal, Thomas Michael, MD;  Location: [facility] OR    CV CORONARY ANGIOGRAM N/A 08/25/2021    Procedure: Coronary Angiogram with possible intervention;  Surgeon: David Wilhelm MD;  Location:  [facility]    ENDOSCOPIC RETROGRADE CHOLANGIOPANCREATOGRAM N/A 06/25/2021    Procedure: ENDOSCOPIC RETROGRADE CHOLANGIOPANCREATOGRAPHY with ballon sweep of bile ducts for stones, balloon dilation of bile ducts and bile duct stent placement;  Surgeon: Gregory Gabriel MD;  Location: [facility] OR    ENDOSCOPIC RETROGRADE CHOLANGIOPANCREATOGRAM N/A 07/22/2021    Procedure: ENDOSCOPIC RETROGRADE CHOLANGIOPANCREATOGRAPHY STONE REMOVAL, DILATION AND STENT PLACEMENT;  Surgeon: Gregory aGbriel MD;  Location:  OR    ENDOSCOPIC RETROGRADE CHOLANGIOPANCREATOGRAPHY, EXCHANGE TUBE/STENT N/A 05/05/2021    Procedure: ENDOSCOPIC RETROGRADE CHOLANGIOPANCREATOGRAPHY WITH STENT EXCHANGE, STONE EXTRACTION, AND DILATION;  Surgeon: Gregory Gabriel MD;  Location: [facility] OR    ENDOSCOPIC RETROGRADE CHOLANGIOPANCREATOGRAPHY, EXCHANGE TUBE/STENT N/A 05/10/2021    Procedure: ENDOSCOPIC RETROGRADE CHOLANGIOPANCREATOGRAPHY with biliary dilation, stone removal, stent exchange;  Surgeon: Gregory Gabriel MD;  Location: [facility] OR    ENDOSCOPIC RETROGRADE CHOLANGIOPANCREATOGRAPHY, EXCHANGE TUBE/STENT N/A 06/10/2021    Procedure: ENDOSCOPIC RETROGRADE CHOLANGIOPANCREATOGRAPHY, WITH biliary stent exchange, stone removal;  Surgeon: Woodrow Lott MD;  Location: [facility] OR    ENDOSCOPIC RETROGRADE CHOLANGIOPANCREATOGRAPHY, EXCHANGE TUBE/STENT N/A 08/30/2021     Procedure: ENDOSCOPIC RETROGRADE CHOLANGIOPANCREATOGRAPHY with biliary dilation, stone removal, stent exchange;  Surgeon: Gregory Gabriel MD;  Location: [facility] OR    ENDOSCOPIC RETROGRADE CHOLANGIOPANCREATOGRAPHY, EXCHANGE TUBE/STENT N/A 10/01/2021    Procedure: ENDOSCOPIC RETROGRADE CHOLANGIOPANCREATOGRAPHY with balloon sweep of bile ducts, bile duct stent exchanged;  Surgeon: Gregory Gabriel MD;  Location: [facility] OR    ESOPHAGOSCOPY, GASTROSCOPY, DUODENOSCOPY (EGD), COMBINED N/A 2021    Procedure: ESOPHAGOGASTRODUODENOSCOPY (EGD);  Surgeon: Leventhal, Thomas Michael, MD;  Location: [facility]    ESOPHAGOSCOPY, GASTROSCOPY, DUODENOSCOPY (EGD), COMBINED N/A 10/01/2021    Procedure: ESOPHAGOGASTRODUODENOSCOPY (EGD) with varices banding;  Surgeon: Gregory Gabriel MD;  Location: [facility] OR    GYN SURGERY      bilat fallopian tubes and ovaries removed    IR ERCP  2020    IR ERCP  2021    IR LIVER BIOPSY PERCUTANEOUS  2022    PICC DOUBLE LUMEN PLACEMENT Right 2021    42cm (2cm external), Lateral brachial vein    RETURN LIVER TRANSPLANT N/A 2021    Procedure: CLOSURE, WOUND, ABDOMEN, SECONDARY, ABDOMINAL WASHOUT;  Surgeon: Ernesto Schmitt MD;  Location: [facility] OR    TRANSPLANT  2021    Liver due to PSC    TRANSPLANT LIVER RECIPIENT  DONOR N/A 2021    Procedure: Opening of abdomen, Abdominal Exploration and aborted liver transplant.;  Surgeon: Ernesto Schmitt MD;  Location: [facility] OR    TRANSPLANT LIVER RECIPIENT  DONOR N/A 2021    Procedure: TRANSPLANT, LIVER, RECIPIENT,  DONOR;  Surgeon: Ernesto Schmitt MD;  Location: [facility] OR     Current Outpatient Medications   Medication Sig Dispense Refill    lisinopril (ZESTRIL) 10 MG tablet Take 1 tablet (10 mg) by mouth daily 90 tablet 3    multivitamin (CENTRUM SILVER) tablet Take 1 tablet by mouth daily      tacrolimus (GENERIC) 0.5 MG capsule Take 1 capsule (0.5 mg) by mouth every 12 hours Total dose: 1.5 mg   "capsule 3    tacrolimus (GENERIC) 1 MG capsule Take 1 capsule (1 mg) by mouth every 12 hours Total dose: 1.5 mg  capsule 3       Allergies   Allergen Reactions    Iodinated Contrast Media Hives     PATIENT HAD HIVE REACTION AFTER ADMINISTERING CT CONTRAST DYE.      Contrast Dye         Social History     Tobacco Use    Smoking status: Never    Smokeless tobacco: Never   Substance Use Topics    Alcohol use: No     Comment: Last drink was 2017       History   Drug Use No           Review of Systems  CONSTITUTIONAL: NEGATIVE for fever, chills, change in weight  RESP: NEGATIVE for significant cough or SOB  CV: NEGATIVE for chest pain, palpitations or peripheral edema        Objective    /78 (BP Location: Left arm, Patient Position: Sitting, Cuff Size: Adult Large)   Pulse 87   Temp 98.4  F (36.9  C) (Oral)   Resp 14   Ht 1.645 m (5' 4.75\")   Wt 77.1 kg (170 lb)   LMP  (LMP Unknown)   SpO2 99%   BMI 28.51 kg/m     Estimated body mass index is 28.51 kg/m  as calculated from the following:    Height as of this encounter: 1.645 m (5' 4.75\").    Weight as of this encounter: 77.1 kg (170 lb).    Physical Exam  Constitutional:       General: She is not in acute distress.     Appearance: Normal appearance. She is not ill-appearing, toxic-appearing or diaphoretic.   HENT:      Head: Normocephalic and atraumatic.   Eyes:      Conjunctiva/sclera: Conjunctivae normal.   Cardiovascular:      Rate and Rhythm: Normal rate and regular rhythm.      Heart sounds: Normal heart sounds.   Pulmonary:      Effort: Pulmonary effort is normal.      Breath sounds: Normal breath sounds.   Skin:     General: Skin is warm and dry.   Neurological:      Mental Status: She is alert and oriented to person, place, and time.   Psychiatric:         Mood and Affect: Mood normal.         Behavior: Behavior normal.         Thought Content: Thought content normal.         Judgment: Judgment normal.       Recent Labs   Lab Test " 05/29/24  0756 02/27/24  0749   HGB 13.1 13.0    193    140   POTASSIUM 4.3 4.0   CR 0.80 0.97*        Diagnostics  No labs were ordered during this visit.   No EKG required for low risk surgery (cataract, skin procedure, breast biopsy, etc).    Revised Cardiac Risk Index (RCRI)  The patient has the following serious cardiovascular risks for perioperative complications:   - No serious cardiac risks = 0 points     RCRI Interpretation: 0 points: Class I (very low risk - 0.4% complication rate)         Signed Electronically by: CHELSEY Cloud CNP  Copy of this evaluation report is provided to requesting physician.

## 2024-07-05 DIAGNOSIS — Z98.890 POSTOPERATIVE EYE STATE: Primary | ICD-10-CM

## 2024-07-05 RX ORDER — MOXIFLOXACIN 5 MG/ML
1 SOLUTION/ DROPS OPHTHALMIC 4 TIMES DAILY
Qty: 3 ML | Refills: 0 | Status: SHIPPED | OUTPATIENT
Start: 2024-07-05

## 2024-07-10 ENCOUNTER — ANESTHESIA EVENT (OUTPATIENT)
Dept: SURGERY | Facility: AMBULATORY SURGERY CENTER | Age: 59
End: 2024-07-10
Payer: COMMERCIAL

## 2024-07-11 ENCOUNTER — ANESTHESIA (OUTPATIENT)
Dept: SURGERY | Facility: AMBULATORY SURGERY CENTER | Age: 59
End: 2024-07-11
Payer: COMMERCIAL

## 2024-07-11 ENCOUNTER — HOSPITAL ENCOUNTER (OUTPATIENT)
Facility: AMBULATORY SURGERY CENTER | Age: 59
Discharge: HOME | End: 2024-07-11
Attending: OPHTHALMOLOGY
Payer: COMMERCIAL

## 2024-07-11 VITALS
OXYGEN SATURATION: 99 % | DIASTOLIC BLOOD PRESSURE: 77 MMHG | SYSTOLIC BLOOD PRESSURE: 113 MMHG | BODY MASS INDEX: 26.52 KG/M2 | RESPIRATION RATE: 16 BRPM | HEART RATE: 78 BPM | TEMPERATURE: 96.9 F | WEIGHT: 165 LBS | HEIGHT: 66 IN

## 2024-07-11 DIAGNOSIS — H25.813 COMBINED FORMS OF AGE-RELATED CATARACT OF BOTH EYES: Primary | ICD-10-CM

## 2024-07-11 PROCEDURE — 66984 XCAPSL CTRC RMVL W/O ECP: CPT | Mod: RT | Performed by: OPHTHALMOLOGY

## 2024-07-11 PROCEDURE — 66984 XCAPSL CTRC RMVL W/O ECP: CPT | Performed by: NURSE ANESTHETIST, CERTIFIED REGISTERED

## 2024-07-11 PROCEDURE — V2599 CONTACT LENS/ES OTHER TYPE: HCPCS | Mod: DBP,RT

## 2024-07-11 PROCEDURE — 66984 XCAPSL CTRC RMVL W/O ECP: CPT | Performed by: STUDENT IN AN ORGANIZED HEALTH CARE EDUCATION/TRAINING PROGRAM

## 2024-07-11 PROCEDURE — 96999 UNLISTED SPEC DERM SVC/PX: CPT | Mod: RT | Performed by: OPHTHALMOLOGY

## 2024-07-11 PROCEDURE — 66984 XCAPSL CTRC RMVL W/O ECP: CPT | Mod: RT

## 2024-07-11 DEVICE — IMPLANTABLE DEVICE: Type: IMPLANTABLE DEVICE | Site: EYE | Status: FUNCTIONAL

## 2024-07-11 RX ORDER — ONDANSETRON 4 MG/1
4 TABLET, ORALLY DISINTEGRATING ORAL EVERY 30 MIN PRN
Status: DISCONTINUED | OUTPATIENT
Start: 2024-07-11 | End: 2024-07-12 | Stop reason: HOSPADM

## 2024-07-11 RX ORDER — OXYCODONE HYDROCHLORIDE 5 MG/1
5 TABLET ORAL
Status: DISCONTINUED | OUTPATIENT
Start: 2024-07-11 | End: 2024-07-12 | Stop reason: HOSPADM

## 2024-07-11 RX ORDER — SODIUM CHLORIDE, SODIUM LACTATE, POTASSIUM CHLORIDE, CALCIUM CHLORIDE 600; 310; 30; 20 MG/100ML; MG/100ML; MG/100ML; MG/100ML
INJECTION, SOLUTION INTRAVENOUS CONTINUOUS
Status: DISCONTINUED | OUTPATIENT
Start: 2024-07-11 | End: 2024-07-12 | Stop reason: HOSPADM

## 2024-07-11 RX ORDER — LIDOCAINE 40 MG/G
CREAM TOPICAL
Status: DISCONTINUED | OUTPATIENT
Start: 2024-07-11 | End: 2024-07-12 | Stop reason: HOSPADM

## 2024-07-11 RX ORDER — MOXIFLOXACIN IN NACL,ISO-OS/PF 0.3MG/0.3
SYRINGE (ML) INTRAOCULAR PRN
Status: DISCONTINUED | OUTPATIENT
Start: 2024-07-11 | End: 2024-07-11 | Stop reason: HOSPADM

## 2024-07-11 RX ORDER — NALOXONE HYDROCHLORIDE 0.4 MG/ML
0.1 INJECTION, SOLUTION INTRAMUSCULAR; INTRAVENOUS; SUBCUTANEOUS
Status: DISCONTINUED | OUTPATIENT
Start: 2024-07-11 | End: 2024-07-12 | Stop reason: HOSPADM

## 2024-07-11 RX ORDER — CYCLOPENTOLAT/TROPIC/PHENYLEPH 1%-1%-2.5%
1 DROPS (EA) OPHTHALMIC (EYE)
Status: COMPLETED | OUTPATIENT
Start: 2024-07-11 | End: 2024-07-11

## 2024-07-11 RX ORDER — ONDANSETRON 2 MG/ML
4 INJECTION INTRAMUSCULAR; INTRAVENOUS EVERY 30 MIN PRN
Status: DISCONTINUED | OUTPATIENT
Start: 2024-07-11 | End: 2024-07-12 | Stop reason: HOSPADM

## 2024-07-11 RX ORDER — DEXAMETHASONE SODIUM PHOSPHATE 10 MG/ML
4 INJECTION, SOLUTION INTRAMUSCULAR; INTRAVENOUS
Status: DISCONTINUED | OUTPATIENT
Start: 2024-07-11 | End: 2024-07-12 | Stop reason: HOSPADM

## 2024-07-11 RX ORDER — OXYCODONE HYDROCHLORIDE 5 MG/1
10 TABLET ORAL
Status: DISCONTINUED | OUTPATIENT
Start: 2024-07-11 | End: 2024-07-12 | Stop reason: HOSPADM

## 2024-07-11 RX ORDER — TRIAMCINOLONE ACETONIDE 40 MG/ML
INJECTION, SUSPENSION INTRA-ARTICULAR; INTRAMUSCULAR PRN
Status: DISCONTINUED | OUTPATIENT
Start: 2024-07-11 | End: 2024-07-11 | Stop reason: HOSPADM

## 2024-07-11 RX ORDER — BALANCED SALT SOLUTION 6.4; .75; .48; .3; 3.9; 1.7 MG/ML; MG/ML; MG/ML; MG/ML; MG/ML; MG/ML
SOLUTION OPHTHALMIC PRN
Status: DISCONTINUED | OUTPATIENT
Start: 2024-07-11 | End: 2024-07-11 | Stop reason: HOSPADM

## 2024-07-11 RX ORDER — PROPARACAINE HYDROCHLORIDE 5 MG/ML
1 SOLUTION/ DROPS OPHTHALMIC ONCE
Status: COMPLETED | OUTPATIENT
Start: 2024-07-11 | End: 2024-07-11

## 2024-07-11 RX ORDER — ACETAMINOPHEN 325 MG/1
975 TABLET ORAL ONCE
Status: DISCONTINUED | OUTPATIENT
Start: 2024-07-11 | End: 2024-07-12 | Stop reason: HOSPADM

## 2024-07-11 RX ORDER — LIDOCAINE HYDROCHLORIDE 10 MG/ML
INJECTION, SOLUTION EPIDURAL; INFILTRATION; INTRACAUDAL; PERINEURAL PRN
Status: DISCONTINUED | OUTPATIENT
Start: 2024-07-11 | End: 2024-07-11 | Stop reason: HOSPADM

## 2024-07-11 RX ORDER — FENTANYL CITRATE 50 UG/ML
25 INJECTION, SOLUTION INTRAMUSCULAR; INTRAVENOUS
Status: DISCONTINUED | OUTPATIENT
Start: 2024-07-11 | End: 2024-07-12 | Stop reason: HOSPADM

## 2024-07-11 RX ORDER — TETRACAINE HYDROCHLORIDE 5 MG/ML
SOLUTION OPHTHALMIC PRN
Status: DISCONTINUED | OUTPATIENT
Start: 2024-07-11 | End: 2024-07-11 | Stop reason: HOSPADM

## 2024-07-11 RX ORDER — DICLOFENAC SODIUM 1 MG/ML
1 SOLUTION/ DROPS OPHTHALMIC
Status: COMPLETED | OUTPATIENT
Start: 2024-07-11 | End: 2024-07-11

## 2024-07-11 RX ORDER — FENTANYL CITRATE 50 UG/ML
INJECTION, SOLUTION INTRAMUSCULAR; INTRAVENOUS PRN
Status: DISCONTINUED | OUTPATIENT
Start: 2024-07-11 | End: 2024-07-11

## 2024-07-11 RX ADMIN — Medication 1 DROP: at 09:40

## 2024-07-11 RX ADMIN — DICLOFENAC SODIUM 1 DROP: 1 SOLUTION/ DROPS OPHTHALMIC at 09:40

## 2024-07-11 RX ADMIN — Medication 1 DROP: at 09:33

## 2024-07-11 RX ADMIN — PROPARACAINE HYDROCHLORIDE 1 DROP: 5 SOLUTION/ DROPS OPHTHALMIC at 09:32

## 2024-07-11 RX ADMIN — DICLOFENAC SODIUM 1 DROP: 1 SOLUTION/ DROPS OPHTHALMIC at 09:46

## 2024-07-11 RX ADMIN — FENTANYL CITRATE 50 MCG: 50 INJECTION, SOLUTION INTRAMUSCULAR; INTRAVENOUS at 11:05

## 2024-07-11 RX ADMIN — Medication 1 DROP: at 09:46

## 2024-07-11 RX ADMIN — SODIUM CHLORIDE, SODIUM LACTATE, POTASSIUM CHLORIDE, CALCIUM CHLORIDE: 600; 310; 30; 20 INJECTION, SOLUTION INTRAVENOUS at 09:35

## 2024-07-11 RX ADMIN — FENTANYL CITRATE 50 MCG: 50 INJECTION, SOLUTION INTRAMUSCULAR; INTRAVENOUS at 11:02

## 2024-07-11 RX ADMIN — DICLOFENAC SODIUM 1 DROP: 1 SOLUTION/ DROPS OPHTHALMIC at 09:33

## 2024-07-11 NOTE — ANESTHESIA POSTPROCEDURE EVALUATION
Patient: Berta Nava    Procedure: Procedure(s):  RIGHT EYE PHACOEMULSIFICATION, CATARACT, WITH INTRAOCULAR LENS IMPLANT, TORIC LENS       Anesthesia Type:  MAC    Note:  Disposition: Outpatient   Postop Pain Control: Uneventful            Sign Out: Well controlled pain   PONV: No   Neuro/Psych: Uneventful            Sign Out: Acceptable/Baseline neuro status   Airway/Respiratory: Uneventful            Sign Out: Acceptable/Baseline resp. status   CV/Hemodynamics: Uneventful            Sign Out: Acceptable CV status; No obvious hypovolemia; No obvious fluid overload   Other NRE: NONE   DID A NON-ROUTINE EVENT OCCUR? No           Last vitals:  Vitals Value Taken Time   /77 07/11/24 1145   Temp 36.1  C (96.9  F) 07/11/24 1145   Pulse     Resp 16 07/11/24 1145   SpO2 99 % 07/11/24 1145       Electronically Signed By: Yonas Anderson MD  July 11, 2024  12:11 PM

## 2024-07-11 NOTE — BRIEF OP NOTE
[facility]    Brief Operative Note    Pre-operative diagnosis: Combined forms of age-related cataract of both eyes [H25.813]  Post-operative diagnosis Same as pre-operative diagnosis    Procedure: RIGHT EYE PHACOEMULSIFICATION, CATARACT, WITH INTRAOCULAR LENS IMPLANT, TORIC LENS, Right - Eye    Surgeon: Surgeons and Role:     * Dory Palmer MD - Primary     * Stephanie Deleon MD - Resident - Assisting  Anesthesia: MAC   Estimated Blood Loss: None    Drains: None  Specimens: * No specimens in log *  Findings:   None.  Complications: None.  Implants:   Implant Name Type Inv. Item Serial No.  Lot No. LRB No. Used Action   Clareon Toric IOL [id number] +23.0D Lens/Eye Implant  [id number] MELISA  Right 1 Implanted

## 2024-07-11 NOTE — ANESTHESIA PREPROCEDURE EVALUATION
Anesthesia Pre-Procedure Evaluation    Patient: Berta Nava   MRN: [id number] : 1965        Procedure : Procedure(s):  RIGHT EYE PHACOEMULSIFICATION, CATARACT, WITH INTRAOCULAR LENS IMPLANT, TORIC LENS          Past Medical History:   Diagnosis Date    Ascites     Biliary cirrhosis (H)     Cholangitis, sclerosing (H28)     Cirrhosis of liver with ascites (H) 2021    Hearing loss of left ear     wears a hearing aide    History of blood transfusion     History of low potassium     Hyperlipidemia     Hypertension     Liver transplant rejection (H) 2022    Mild Rejection    SBP (spontaneous bacterial peritonitis) (H) 2021    Sjogren's syndrome (H24)     Ulcerative colitis (H)     Ulcerative pancolitis (H)       Past Surgical History:   Procedure Laterality Date    BENCH LIVER N/A 2021    Procedure: Bench liver;  Surgeon: Ernesto Schmitt MD;  Location: [facility] OR    COLONOSCOPY N/A 2022    Procedure: COLONOSCOPY, WITH BIOPSY;  Surgeon: Leventhal, Thomas Michael, MD;  Location: [facility] OR    COLONOSCOPY N/A 10/31/2022    Procedure: COLONOSCOPY, WITH POLYPECTOMY AND BIOPSY;  Surgeon: Kendrick Crawford MD;  Location: [facility] OR    COLONOSCOPY N/A 10/31/2023    Procedure: Colonoscopy with biopsy and polypectomy;  Surgeon: Kendrick Crawford MD;  Location: [facility] OR    COMBINED ESOPHAGOSCOPY, GASTROSCOPY, DUODENOSCOPY (EGD), REMOVE BILIARY STENT N/A 2022    Procedure: ESOPHAGOGASTRODUODENOSCOPY, WITH BILIARY STENT REMOVAL;  Surgeon: Leventhal, Thomas Michael, MD;  Location: [facility] OR    CV CORONARY ANGIOGRAM N/A 2021    Procedure: Coronary Angiogram with possible intervention;  Surgeon: David Wilhelm MD;  Location:  [facility]ARD[facility] CATH LAB    ENDOSCOPIC RETROGRADE CHOLANGIOPANCREATOGRAM N/A 2021    Procedure: ENDOSCOPIC RETROGRADE CHOLANGIOPANCREATOGRAPHY with ballon sweep of bile ducts for stones, balloon dilation of bile ducts and bile duct stent  placement;  Surgeon: Gregory Gabriel MD;  Location: [facility] OR    ENDOSCOPIC RETROGRADE CHOLANGIOPANCREATOGRAM N/A 07/22/2021    Procedure: ENDOSCOPIC RETROGRADE CHOLANGIOPANCREATOGRAPHY STONE REMOVAL, DILATION AND STENT PLACEMENT;  Surgeon: Gregory Gabriel MD;  Location:  OR    ENDOSCOPIC RETROGRADE CHOLANGIOPANCREATOGRAPHY, EXCHANGE TUBE/STENT N/A 05/05/2021    Procedure: ENDOSCOPIC RETROGRADE CHOLANGIOPANCREATOGRAPHY WITH STENT EXCHANGE, STONE EXTRACTION, AND DILATION;  Surgeon: Gregory Gabriel MD;  Location: [facility] OR    ENDOSCOPIC RETROGRADE CHOLANGIOPANCREATOGRAPHY, EXCHANGE TUBE/STENT N/A 05/10/2021    Procedure: ENDOSCOPIC RETROGRADE CHOLANGIOPANCREATOGRAPHY with biliary dilation, stone removal, stent exchange;  Surgeon: Gregory Gabriel MD;  Location: [facility] OR    ENDOSCOPIC RETROGRADE CHOLANGIOPANCREATOGRAPHY, EXCHANGE TUBE/STENT N/A 06/10/2021    Procedure: ENDOSCOPIC RETROGRADE CHOLANGIOPANCREATOGRAPHY, WITH biliary stent exchange, stone removal;  Surgeon: Woodrow Lott MD;  Location: [facility] OR    ENDOSCOPIC RETROGRADE CHOLANGIOPANCREATOGRAPHY, EXCHANGE TUBE/STENT N/A 08/30/2021    Procedure: ENDOSCOPIC RETROGRADE CHOLANGIOPANCREATOGRAPHY with biliary dilation, stone removal, stent exchange;  Surgeon: Gregory Gabriel MD;  Location: [facility] OR    ENDOSCOPIC RETROGRADE CHOLANGIOPANCREATOGRAPHY, EXCHANGE TUBE/STENT N/A 10/01/2021    Procedure: ENDOSCOPIC RETROGRADE CHOLANGIOPANCREATOGRAPHY with balloon sweep of bile ducts, bile duct stent exchanged;  Surgeon: Gregory Gabriel MD;  Location: [facility] OR    ESOPHAGOSCOPY, GASTROSCOPY, DUODENOSCOPY (EGD), COMBINED N/A 09/04/2021    Procedure: ESOPHAGOGASTRODUODENOSCOPY (EGD);  Surgeon: Leventhal, Thomas Michael, MD;  Location:  GI    ESOPHAGOSCOPY, GASTROSCOPY, DUODENOSCOPY (EGD), COMBINED N/A 10/01/2021    Procedure: ESOPHAGOGASTRODUODENOSCOPY (EGD) with varices banding;  Surgeon: Gregory Gabriel MD;  Location: [facility] OR    GYN SURGERY      bilat fallopian tubes and ovaries removed     IR ERCP  2020    IR ERCP  2021    IR LIVER BIOPSY PERCUTANEOUS  2022    PICC DOUBLE LUMEN PLACEMENT Right 2021    42cm (2cm external), Lateral brachial vein    RETURN LIVER TRANSPLANT N/A 2021    Procedure: CLOSURE, WOUND, ABDOMEN, SECONDARY, ABDOMINAL WASHOUT;  Surgeon: Ernesto Schmitt MD;  Location: [facility] OR    TRANSPLANT  2021    Liver due to PSC    TRANSPLANT LIVER RECIPIENT  DONOR N/A 2021    Procedure: Opening of abdomen, Abdominal Exploration and aborted liver transplant.;  Surgeon: Ernesto Schmitt MD;  Location: [facility] OR    TRANSPLANT LIVER RECIPIENT  DONOR N/A 2021    Procedure: TRANSPLANT, LIVER, RECIPIENT,  DONOR;  Surgeon: Ernesto Schmitt MD;  Location: [facility] OR      Allergies   Allergen Reactions    Iodinated Contrast Media Hives     PATIENT HAD HIVE REACTION AFTER ADMINISTERING CT CONTRAST DYE.      Contrast Dye       Social History     Tobacco Use    Smoking status: Never    Smokeless tobacco: Never   Substance Use Topics    Alcohol use: No     Comment: Last drink was       Wt Readings from Last 1 Encounters:   24 74.8 kg (165 lb)        Anesthesia Evaluation   Pt has had prior anesthetic.     No history of anesthetic complications       ROS/MED HX  ENT/Pulmonary:  - neg pulmonary ROS     Neurologic:  - neg neurologic ROS     Cardiovascular:  - neg cardiovascular ROS   (+)  hypertension- -   -  - -                                      METS/Exercise Tolerance: >4 METS    Hematologic:  - neg hematologic  ROS     Musculoskeletal:  - neg musculoskeletal ROS     GI/Hepatic:  - neg GI/hepatic ROS   (+)             liver disease (s/p liver TX ),       Renal/Genitourinary:  - neg Renal ROS     Endo:  - neg endo ROS     Psychiatric/Substance Use:  - neg psychiatric ROS     Infectious Disease:  - neg infectious disease ROS     Malignancy:  - neg malignancy ROS     Other:  - neg other ROS          Physical Exam    Airway         Mallampati: I   TM distance: > 3 FB   Neck ROM: full   Mouth opening: > 3 cm    Respiratory Devices and Support         Dental       (+) Completely normal teeth      Cardiovascular   cardiovascular exam normal          Pulmonary   pulmonary exam normal                OUTSIDE LABS:  CBC:   Lab Results   Component Value Date    WBC 7.5 05/29/2024    WBC 8.8 02/27/2024    HGB 13.1 05/29/2024    HGB 13.0 02/27/2024    HCT 38.5 05/29/2024    HCT 37.7 02/27/2024     05/29/2024     02/27/2024     BMP:   Lab Results   Component Value Date     05/29/2024     02/27/2024    POTASSIUM 4.3 05/29/2024    POTASSIUM 4.0 02/27/2024    CHLORIDE 105 05/29/2024    CHLORIDE 108 (H) 02/27/2024    CO2 24 05/29/2024    CO2 22 02/27/2024    BUN 18.3 05/29/2024    BUN 24.5 (H) 02/27/2024    CR 0.80 05/29/2024    CR 0.97 (H) 02/27/2024    GLC 87 05/29/2024    GLC 86 02/27/2024     COAGS:   Lab Results   Component Value Date    PTT 36 12/03/2021    INR 1.09 05/17/2022    FIBR 465 11/22/2021     POC:   Lab Results   Component Value Date    BGM 95 06/25/2021     HEPATIC:   Lab Results   Component Value Date    ALBUMIN 4.4 05/29/2024    PROTTOTAL 7.3 05/29/2024    ALT 17 05/29/2024    AST 23 05/29/2024    ALKPHOS 51 05/29/2024    BILITOTAL 0.3 05/29/2024    MIHIR 26 11/19/2021     OTHER:   Lab Results   Component Value Date    PH 7.48 (H) 11/20/2021    LACT 0.6 (L) 12/03/2021    A1C 4.6 03/22/2023    MARIELENA 9.6 05/29/2024    PHOS 3.9 02/27/2024    MAG 1.7 02/27/2024    LIPASE 155 12/03/2021    AMYLASE 36 11/19/2021    CRP 54.0 (H) 11/17/2021    SED 20 04/10/2023       Anesthesia Plan    ASA Status:  2    NPO Status:  NPO Appropriate    Anesthesia Type: MAC.     - Reason for MAC: immobility needed              Consents    Anesthesia Plan(s) and associated risks, benefits, and realistic alternatives discussed. Questions answered and patient/representative(s) expressed understanding.     - Discussed:     - Discussed  "with:  Patient            Postoperative Care       PONV prophylaxis: Ondansetron (or other 5HT-3), Dexamethasone or Solumedrol     Comments:               Yonas Anderson MD    I have reviewed the pertinent notes and labs in the chart from the past 30 days and (re)examined the patient.  Any updates or changes from those notes are reflected in this note.              # Overweight: Estimated body mass index is 26.63 kg/m  as calculated from the following:    Height as of this encounter: 1.676 m (5' 6\").    Weight as of this encounter: 74.8 kg (165 lb).      "

## 2024-07-11 NOTE — DISCHARGE INSTRUCTIONS
Post-Operative Instructions     FIRST 24 HOURS AFTER SURGERY:  You are allowed to Tylenol (acetaminophen) 650mg every four hours as needed for pain unless you have liver disease or are allergic to Tylenol..  Continue taking your regular medications and eye drops in the non-operative eye.  Do not remove the metal or plastic shield unless otherwise instructed by your surgeon.  Do not operate a car, motorcycle, or machinery for 24 hours after surgery.  Call ED immediately if you have SEVERE PAIN unrelieved with Tylenol. And  ask for the resident on call.     MEDICATION INSTRUCTIONS:  -You will not have treatment eye drops prescription as medications were injected into your eye.  -If you feel your eyes are dry and itchy, you can use regular lubricating drops for one month after the surgery. These eye drops can be found over the counter Brand names example: Systane, Refresh. Please choose preservative free drops. To be used four times a day and as needed for 1 month  -Instilling the eye drops directly into the eye.    -If you had previously any glaucoma eye drops, You can remove the cover and instill the drops as prescribed before and after the procedure      GENERAL INSTRUCTIONS:  Hygiene of the operated eye  Wash your hands thoroughly before caring for the eye.  If the lids are sticky or itchy in the morning, debris, or matter can be gently wiped away with a cotton ball moistened with tap water. DO NOT press on the lids or eyeball.     FIVE MINUTES APART. If ointment is prescribed, it should be applied last.  If you have been taking medications in the non-operated eye, continue as they were prescribed.  If you take medications by mouth for a medical problem, continue as they were prescribed (unless otherwise instructed by physician)    EYE PROTECTION  From the time of surgery until the time of your post-operative day 1 appointment, wear the eye shield at all times. Then, wear it whenever sleeping, for 1  week.  Protective sunglasses may be worn as needed for your comfort, and are suggested for outdoor activities.    ACTIVITIES  Avoid vigorous exertion and heavy lifting for the first week after surgery  You may take a bath or shower, but avoid getting water directly into your eye for one week after surgery, usually by keeping your eyes closed during the bath.  You should discuss driving and traveling with your surgeon. You may ride in a car and fly in an airplane unless otherwise instructed.  Avoid swimming or using a hot tube/sauna/pool/lake for 2 weeks after the surgery.      WHAT TO EXPECT:  Mild irritation and discomfort are normal.    Call the doctor if you experience any of the following:  Severe eye pain  Nausea  Vomiting  Severe headache     [facility]  Home Care Following Anesthesia  For 24 hours after surgery:  Get plenty of rest.  A responsible adult must stay with you for at least 24 hours after you leave the surgery center.  Do not drive or use heavy equipment.  If you have weakness or tingling, don't drive or use heavy equipment until this feeling goes away.   Do not drink alcohol.   Avoid strenuous or risky activities.  Ask for help when climbing stairs.  You may feel lightheaded.  IF so, sit for a few minutes before standing.  Have someone help you get up.   If you have nausea (feel sick to your stomach): Drink only clear liquids such as apple juice, ginger ale, broth or 7-Up.  Rest may also help.  Be sure to drink enough fluids.  Move to a regular diet as you feel able.   You may have a slight fever.  Call the doctor if your fever is over 100 F (37.7 C) (taken under the tongue) or lasts longer than 24 hours.  You may have a dry mouth, a sore throat, muscle aches or trouble sleeping. These should go away after 24 hours.  Do not make important or legal decisions.   It is recommended to avoid smoking.               Tips for taking pain medications  To get the best  pain relief possible, remember these points:  Take pain medications as directed, before pain becomes severe.  Pain medication can upset your stomach: taking it with food may help.  Constipation is a common side effect of pain medication. Drink plenty of  fluids.  Eat foods high in fiber. Take a stool softener if recommended by your doctor or pharmacist.  Do not drink alcohol, drive or operate machinery while taking pain medications.  Ask about other ways to control pain, such as with heat, ice or relaxation.    Tylenol/Acetaminophen Consumption    If you feel your pain relief is insufficient, you may take Tylenol/Acetaminophen in addition to your narcotic pain medication.   Be careful not to exceed 4,000 mg of Tylenol/Acetaminophen in a 24 hour period from all sources.  If you are taking extra strength Tylenol/acetaminophen (500 mg), the maximum dose is 8 tablets in 24 hours.  If you are taking regular strength acetaminophen (325 mg), the maximum dose is 12 tablets in 24 hours.    Call a doctor for any of the following:  Signs of infection (fever, growing tenderness at the surgery site, a large amount of drainage or bleeding, severe pain, foul-smelling drainage, redness, swelling).  It has been over 8 to 10 hours since surgery and you are still not able to urinate (pass water).  Headache for over 24 hours.  Numbness, tingling or weakness the day after surgery (if you had spinal anesthesia).  Signs of Covid-19 infection (temperature over 100 degrees, shortness of breath, cough, loss of taste/smell, generalized body aches, persistent headache, chills, sore throat, nausea/vomiting/diarrhea)  Your doctor is:  Dr. Dory Palmer, Ophthalmology: [phone]                    Or dial [phone] and ask for the resident on call for:  Ophthalmology  For emergency care, call the:  [facility]:  [phone] (TTY for hearing impaired: [phone])

## 2024-07-11 NOTE — ANESTHESIA CARE TRANSFER NOTE
Patient: Berta Nava    Procedure: Procedure(s):  RIGHT EYE PHACOEMULSIFICATION, CATARACT, WITH INTRAOCULAR LENS IMPLANT, TORIC LENS       Diagnosis: Combined forms of age-related cataract of both eyes [H25.813]  Diagnosis Additional Information: No value filed.    Anesthesia Type:   MAC     Note:    Oropharynx: oropharynx clear of all foreign objects and spontaneously breathing  Level of Consciousness: awake  Oxygen Supplementation: room air    Independent Airway: airway patency satisfactory and stable  Dentition: dentition unchanged  Vital Signs Stable: post-procedure vital signs reviewed and stable  Report to RN Given: handoff report given  Patient transferred to: Phase II    Handoff Report: Identifed the Patient, Identified the Reponsible Provider, Reviewed the pertinent medical history, Discussed the surgical course, Reviewed Intra-OP anesthesia mangement and issues during anesthesia, Set expectations for post-procedure period and Allowed opportunity for questions and acknowledgement of understanding      Vitals:  Vitals Value Taken Time   BP     Temp     Pulse     Resp     SpO2         Electronically Signed By: CHELSEY Vaughan CRNA  July 11, 2024  11:34 AM

## 2024-07-11 NOTE — OP NOTE
Operative  Report    Patient Name: Berta Nava    MRN: [id number]    Date of Surgery: 7/11/2024    Surgeon: Dory Palmer M.D    Assistant surgeon: Stephanie Deleon    Preoperative Diagnosis: Visually significant cataract, right eye    Postoperative Diagnosis: Same    Procedure: Phacoemulsification with TORIC intraocular lens implant, right eye    Implant: AIM -2.00 D  Implant Name Type Inv. Item Serial No.  Lot No. LRB No. Used Action   Clareon Toric IOL [id number] +23.0D Lens/Eye Implant  [id number] [id number]  Right 1 Implanted       Anesthesia Type: MAC    Estimated Blood Loss: Trace    Complications: None    INDICATIONS FOR PROCEDURE: Patient has a history of visually significant cataract affecting the patient's activity of daily living. After a discussion with the patient, the patient has elected to have the listed procedure(s) to maximize visual potential. All of the appropriate consent forms have been signed and all of the patient's questions have been answered.    DETAILS OF THE PROCEDURE: Patient was identified in the pre operative area and given pre operative eye drops. The patient was then brought into the operating room and intravenous sedation was begun after a time out was performed. The patient was prepped and draped in the usual sterile ophthalmic fashion.     A lid speculum was placed and the operating microscope was brought into position. A paracentesis was made and viscoelastic was injected into the anterior chamber. A clear corneal incision was made using a keratome blade. A cystotome needle and Utrata forceps were used to create an anterior continuous curvilinear capsulorhexis. Then BSS on a blunt tipped cannula was used for hydrodissection and hydrodelineation. Using the phacoemulsification unit, the nucleus was then removed from the eye. Using irrigation and aspiration, the remaining cortical material was removed from the eye. The capsular  bag was infused with viscoelastic material. The intraocular lens was then placed into the capsular bag and secured into position. The remaining viscoelastic material was then removed from the eye via irrigation and aspiration.  BSS on a blunt tipped cannula was used to hydrate all of the wounds. At the end of the case, the wounds were noted to be watertight, the pupil was noted to be round, the lens appeared to be in good position, and the pressure was palpated to be physiologic. Intracameral moxifloxacin and a subconjunctival injection of Kenalog was administered.     Post operative ointment was applied and the eye was patched and shielded. Patient tolerated procedure and was brought to the recovery area in good condition. Patient will follow in the eye clinic in one day.

## 2024-07-12 ENCOUNTER — OFFICE VISIT (OUTPATIENT)
Dept: OPHTHALMOLOGY | Facility: CLINIC | Age: 59
End: 2024-07-12
Attending: OPHTHALMOLOGY
Payer: COMMERCIAL

## 2024-07-12 DIAGNOSIS — Z98.890 POSTOPERATIVE EYE STATE: Primary | ICD-10-CM

## 2024-07-12 PROCEDURE — 99213 OFFICE O/P EST LOW 20 MIN: CPT | Performed by: OPHTHALMOLOGY

## 2024-07-12 PROCEDURE — 99024 POSTOP FOLLOW-UP VISIT: CPT | Performed by: OPHTHALMOLOGY

## 2024-07-12 ASSESSMENT — REFRACTION_WEARINGRX
OD_SPHERE: -3.25
OS_AXIS: 088
OD_ADD: +3.00
OD_CYLINDER: +0.50
OS_ADD: +3.00
OS_SPHERE: -0.25
SPECS_TYPE: PAL
OS_CYLINDER: +1.00
OD_AXIS: 108

## 2024-07-12 ASSESSMENT — SLIT LAMP EXAM - LIDS
COMMENTS: NORMAL
COMMENTS: NORMAL

## 2024-07-12 ASSESSMENT — VISUAL ACUITY
OS_CC: 20/25
OD_PH_SC: 20/25
OD_SC: 20/50
METHOD: SNELLEN - LINEAR
CORRECTION_TYPE: GLASSES
OD_PH_SC+: -2+1
OD_SC+: +2

## 2024-07-12 ASSESSMENT — TONOMETRY
OS_IOP_MMHG: 13
IOP_METHOD: ICARE
OD_IOP_MMHG: 13

## 2024-07-12 ASSESSMENT — EXTERNAL EXAM - RIGHT EYE: OD_EXAM: NORMAL

## 2024-07-12 ASSESSMENT — EXTERNAL EXAM - LEFT EYE: OS_EXAM: NORMAL

## 2024-07-12 NOTE — NURSING NOTE
Chief Complaints and History of Present Illnesses   Patient presents with    Post Op (Ophthalmology) Right Eye     RIGHT EYE PHACOEMULSIFICATION, CATARACT, WITH INTRAOCULAR LENS IMPLANT, TORIC LENS     Chief Complaint(s) and History of Present Illness(es)       Post Op (Ophthalmology) Right Eye              Laterality: right eye    Associated symptoms: Negative for dryness, eye pain, tearing and foreign body sensation    Treatments tried: no treatments    Pain scale: 0/10    Comments: RIGHT EYE PHACOEMULSIFICATION, CATARACT, WITH INTRAOCULAR LENS IMPLANT, TORIC LENS              Comments    Pt slept well.  No pain.  Shield stayed on all night.  Pt has not started drops.    KARTHIK Chisholm July 12, 2024 2:06 PM

## 2024-07-12 NOTE — PROGRESS NOTES
"Chief complaint   CATARACT EVAL    HPI    Berta Nava 58 year old female       Chief Complaint(s) and History of Present Illness(es)       Cataract    In both eyes.  Associated symptoms include blurred vision, glare and haloes.  Negative for double vision and a need for brighter lights.  Duration of years.  Treatments tried include artificial tears and glasses. Additional comments: Referral from Dr. Cuevas for evaluation of cataract each eye.     Patient notes she is struggling with reading the Sunday crossword in the newspaper. Patient may be interested in monovision. She currently has monovision. Patient notes dryness each eye. \"It may be seasonal. I use Refresh drops.\"     Mabel Crockettsen, COT 9:32 AM 05/13/2024                   Interval hx 07/12/2024  Chief Complaint(s) and History of Present Illness(es)       Post Op (Ophthalmology) Right Eye    In right eye.  Associated symptoms include Negative for dryness, eye pain, tearing and foreign body sensation.  Treatments tried include no treatments.  Pain was noted as 0/10. Additional comments: RIGHT EYE PHACOEMULSIFICATION, CATARACT, WITH INTRAOCULAR LENS IMPLANT, TORIC LENS             Comments    Pt slept well.  No pain.  Shield stayed on all night.  Pt has not started drops.    Joanna Childs, COT July 12, 2024 2:06 PM                         Past ocular history   Prior eye surgery/laser/Trauma: -  CTL wearer:No  Glasses : -  Family Hx of eye disease: -    PMH     Past Medical History:   Diagnosis Date    Ascites     Biliary cirrhosis (H)     Cholangitis, sclerosing (H28)     Cirrhosis of liver with ascites (H) 03/03/2021    Hearing loss of left ear     wears a hearing aide    History of blood transfusion     History of low potassium     Hyperlipidemia     Hypertension     Liver transplant rejection (H) 05/17/2022    Mild Rejection    SBP (spontaneous bacterial peritonitis) (H) 04/30/2021    Sjogren's syndrome (H24)     Ulcerative colitis (H)     Ulcerative " pancolitis (H)        PSH     Past Surgical History:   Procedure Laterality Date    BENCH LIVER N/A 11/18/2021    Procedure: Bench liver;  Surgeon: Ernesto Schmitt MD;  Location: [facility] OR    CATARACT IOL, RT/LT      COLONOSCOPY N/A 06/08/2022    Procedure: COLONOSCOPY, WITH BIOPSY;  Surgeon: Leventhal, Thomas Michael, MD;  Location: [facility] OR    COLONOSCOPY N/A 10/31/2022    Procedure: COLONOSCOPY, WITH POLYPECTOMY AND BIOPSY;  Surgeon: Kendrick Crawford MD;  Location: [facility] OR    COLONOSCOPY N/A 10/31/2023    Procedure: Colonoscopy with biopsy and polypectomy;  Surgeon: Kendrick Crawford MD;  [place] [facility] [place]    COMBINED ESOPHAGOSCOPY, GASTROSCOPY, DUODENOSCOPY (EGD), REMOVE BILIARY STENT N/A 02/09/2022    Procedure: ESOPHAGOGASTRODUODENOSCOPY, WITH BILIARY STENT REMOVAL;  Surgeon: Leventhal, Thomas Michael, MD;  [place] [facility] [place]    [place] CORONARY ANGIOGRAM N/A 08/25/2021    Procedure: Coronary Angiogram with possible intervention;  Surgeon: David Wilhelm MD;  Location:  [facility]    ENDOSCOPIC RETROGRADE CHOLANGIOPANCREATOGRAM N/A 06/25/2021    Procedure: ENDOSCOPIC RETROGRADE CHOLANGIOPANCREATOGRAPHY with ballon sweep of bile ducts for stones, balloon dilation of bile ducts and bile duct stent placement;  Surgeon: Gregory Gabriel MD;  Location:  OR    ENDOSCOPIC RETROGRADE CHOLANGIOPANCREATOGRAM N/A 07/22/2021    Procedure: ENDOSCOPIC RETROGRADE CHOLANGIOPANCREATOGRAPHY STONE REMOVAL, DILATION AND STENT PLACEMENT;  Surgeon: Gregory Gabriel MD;  Location:  OR    ENDOSCOPIC RETROGRADE CHOLANGIOPANCREATOGRAPHY, EXCHANGE TUBE/STENT N/A 05/05/2021    Procedure: ENDOSCOPIC RETROGRADE CHOLANGIOPANCREATOGRAPHY WITH STENT EXCHANGE, STONE EXTRACTION, AND DILATION;  Surgeon: Gregory Gabriel MD;  Location:  OR    ENDOSCOPIC RETROGRADE CHOLANGIOPANCREATOGRAPHY, EXCHANGE TUBE/STENT N/A 05/10/2021    Procedure: ENDOSCOPIC RETROGRADE CHOLANGIOPANCREATOGRAPHY with biliary dilation, stone  removal, stent exchange;  Surgeon: Gregory Gabriel MD;  Location: [facility] OR    ENDOSCOPIC RETROGRADE CHOLANGIOPANCREATOGRAPHY, EXCHANGE TUBE/STENT N/A 06/10/2021    Procedure: ENDOSCOPIC RETROGRADE CHOLANGIOPANCREATOGRAPHY, WITH biliary stent exchange, stone removal;  Surgeon: Woodrow Lott MD;  Location: [facility] OR    ENDOSCOPIC RETROGRADE CHOLANGIOPANCREATOGRAPHY, EXCHANGE TUBE/STENT N/A 2021    Procedure: ENDOSCOPIC RETROGRADE CHOLANGIOPANCREATOGRAPHY with biliary dilation, stone removal, stent exchange;  Surgeon: Gregory Gabriel MD;  Location: [facility] OR    ENDOSCOPIC RETROGRADE CHOLANGIOPANCREATOGRAPHY, EXCHANGE TUBE/STENT N/A 10/01/2021    Procedure: ENDOSCOPIC RETROGRADE CHOLANGIOPANCREATOGRAPHY with balloon sweep of bile ducts, bile duct stent exchanged;  Surgeon: Gregory Gabriel MD;  Location: [facility] OR    ESOPHAGOSCOPY, GASTROSCOPY, DUODENOSCOPY (EGD), COMBINED N/A 2021    Procedure: ESOPHAGOGASTRODUODENOSCOPY (EGD);  Surgeon: Leventhal, Thomas Michael, MD;  Location: [facility]    ESOPHAGOSCOPY, GASTROSCOPY, DUODENOSCOPY (EGD), COMBINED N/A 10/01/2021    Procedure: ESOPHAGOGASTRODUODENOSCOPY (EGD) with varices banding;  Surgeon: Gregory Gabriel MD;  Location: [facility] OR    GYN SURGERY      bilat fallopian tubes and ovaries removed    IR ERCP  2020    IR ERCP  2021    IR LIVER BIOPSY PERCUTANEOUS  2022    PHACOEMULSIFICATION CLEAR CORNEA WITH TORIC INTRAOCULAR LENS IMPLANT Right 2024    Procedure: RIGHT EYE PHACOEMULSIFICATION, CATARACT, WITH INTRAOCULAR LENS IMPLANT, TORIC LENS;  Surgeon: Dory Palmer MD;  Location: [facility] OR    PICC DOUBLE LUMEN PLACEMENT Right 2021    42cm (2cm external), Lateral brachial vein    RETURN LIVER TRANSPLANT N/A 2021    Procedure: CLOSURE, WOUND, ABDOMEN, SECONDARY, ABDOMINAL WASHOUT;  Surgeon: Ernesto Schmitt MD;  Location: [facility] OR    TRANSPLANT  2021    Liver due to PSC    TRANSPLANT LIVER RECIPIENT  DONOR N/A 2021     Procedure: Opening of abdomen, Abdominal Exploration and aborted liver transplant.;  Surgeon: Ernesto Schmitt MD;  Location: [facility] OR    TRANSPLANT LIVER RECIPIENT  DONOR N/A 2021    Procedure: TRANSPLANT, LIVER, RECIPIENT,  DONOR;  Surgeon: Ernesto Schmitt MD;  Location: [facility] OR       [facility]     Current Outpatient Medications   Medication Sig Dispense Refill    lisinopril (ZESTRIL) 10 MG tablet Take 1 tablet (10 mg) by mouth daily 90 tablet 3    moxifloxacin (VIGAMOX) 0.5 % ophthalmic solution Place 1 drop into the right eye 4 times daily 3 mL 0    multivitamin (CENTRUM SILVER) tablet Take 1 tablet by mouth daily      tacrolimus (GENERIC) 0.5 MG capsule Take 1 capsule (0.5 mg) by mouth every 12 hours Total dose: 1.5 mg  capsule 3    tacrolimus (GENERIC) 1 MG capsule Take 1 capsule (1 mg) by mouth every 12 hours Total dose: 1.5 mg  capsule 3     No current facility-administered medications for this visit.       Labs   -    Imaging   -    Drops Currently Taking   -    Assessment/Plan 2024   #pseudophakia right eye 24 (near -2)  Good post op appearence  No complications D1    #Senile Cataracts right eye>left eye (OK to delay left eye surgery for now)  Patient is having difficulty with daily activities due to visual impairment. Patient feels that they are no longer managing with current correction and would like to move forward with cataract surgery. Explained benefits alternatives and risks including but not limited to loss of vision, severe infection, retinal detachment, need for more surgery, visual disturbances etc...  Informed patient that reaching uncorrected vision aim (without glasses) after cataract surgery is not certain. Made patient aware they may need glasses, laser vision correction or in worst case scenario, IOL exchange.      Dilates well  no PXF  no Flomax  no guttae    -Aim: near right eye -2, distance left eye(os will not be scheduled yet)  -  dominant eye  -Previous refractive surgery status:-  -Corneal astigmatism>1  Patient is okay to use toric lens OD  Start moxi after  Patient is s/p liver transplant      Follow up:  Oph: VTD MRX    Attending Physician Attestation:  Complete documentation of historical and exam elements from today's encounter can be found in the full encounter summary report (not reduplicated in this progress note).  I personally obtained the chief complaint(s) and history of present illness.  I confirmed and edited as necessary the review of systems, past medical/surgical history, family history, social history, and examination findings as documented by others; and I examined the patient myself.  I personally reviewed the relevant tests, images, and reports as documented above.  I formulated and edited as necessary the assessment and plan and discussed the findings and management plan with the patient and family. - Dory Palmer MD

## 2024-07-31 ENCOUNTER — OFFICE VISIT (OUTPATIENT)
Dept: OPHTHALMOLOGY | Facility: CLINIC | Age: 59
End: 2024-07-31
Attending: OPHTHALMOLOGY
Payer: COMMERCIAL

## 2024-07-31 DIAGNOSIS — H25.813 COMBINED FORMS OF AGE-RELATED CATARACT OF BOTH EYES: ICD-10-CM

## 2024-07-31 DIAGNOSIS — Z98.890 POSTOPERATIVE EYE STATE: Primary | ICD-10-CM

## 2024-07-31 PROCEDURE — 99024 POSTOP FOLLOW-UP VISIT: CPT | Performed by: OPHTHALMOLOGY

## 2024-07-31 PROCEDURE — 92015 DETERMINE REFRACTIVE STATE: CPT

## 2024-07-31 PROCEDURE — 99213 OFFICE O/P EST LOW 20 MIN: CPT | Performed by: OPHTHALMOLOGY

## 2024-07-31 ASSESSMENT — REFRACTION_WEARINGRX
OD_AXIS: 108
OD_ADD: +3.00
OS_SPHERE: -0.25
SPECS_TYPE: PAL
OD_CYLINDER: +0.50
OS_CYLINDER: +1.00
OD_SPHERE: -3.25
OS_AXIS: 088
OS_ADD: +3.00

## 2024-07-31 ASSESSMENT — VISUAL ACUITY
OD_PH_SC+: -2
OD_PH_SC: 20/20
OS_SC: 20/20
METHOD: SNELLEN - LINEAR
OS_CC: 20/20
OD_SC+: +1
CORRECTION_TYPE: GLASSES
OS_SC+: -1
OD_SC: 20/100

## 2024-07-31 ASSESSMENT — CONF VISUAL FIELD
OD_SUPERIOR_NASAL_RESTRICTION: 0
OD_NORMAL: 1
OS_INFERIOR_NASAL_RESTRICTION: 0
OD_SUPERIOR_TEMPORAL_RESTRICTION: 0
OD_INFERIOR_NASAL_RESTRICTION: 0
OD_INFERIOR_TEMPORAL_RESTRICTION: 0
OS_INFERIOR_TEMPORAL_RESTRICTION: 0
OS_SUPERIOR_NASAL_RESTRICTION: 0
OS_SUPERIOR_TEMPORAL_RESTRICTION: 0
METHOD: COUNTING FINGERS
OS_NORMAL: 1

## 2024-07-31 ASSESSMENT — EXTERNAL EXAM - RIGHT EYE: OD_EXAM: NORMAL

## 2024-07-31 ASSESSMENT — TONOMETRY
OD_IOP_MMHG: 12
OS_IOP_MMHG: 15
IOP_METHOD: TONOPEN

## 2024-07-31 ASSESSMENT — CUP TO DISC RATIO
OS_RATIO: 0.1
OD_RATIO: 0.1

## 2024-07-31 ASSESSMENT — REFRACTION_MANIFEST
OS_AXIS: 075
OD_ADD: +2.50
OS_ADD: +2.50
OD_CYLINDER: SPHERE
OS_CYLINDER: +0.50
OS_SPHERE: +0.50
OD_SPHERE: -1.75

## 2024-07-31 ASSESSMENT — SLIT LAMP EXAM - LIDS
COMMENTS: NORMAL
COMMENTS: NORMAL

## 2024-07-31 ASSESSMENT — EXTERNAL EXAM - LEFT EYE: OS_EXAM: NORMAL

## 2024-07-31 NOTE — NURSING NOTE
"Chief Complaints and History of Present Illnesses   Patient presents with    Post Op (Ophthalmology) Right Eye     3w Post Op, CE IOL right eye      Chief Complaint(s) and History of Present Illness(es)       Post Op (Ophthalmology) Right Eye              Comments: 3w Post Op, CE IOL right eye               Comments    Pt states right eye has a white low underneath bottom eyelid towards corner that appeared about 3-5 days ago. Right eye feels FB sensation and she is seeing a \"strand of hair\" come across vision especially when moving eyes left to right.  Right eye after surgery was doing well and improving but now she feels it is decreasing and becoming blurred. Floaters have improved and denies flashes. No other concerns at this moment. Denies using any gtts as of now.     Kiera Arvizu 2:24 PM  July 31, 2024                        "

## 2024-07-31 NOTE — PROGRESS NOTES
"Chief complaint   CATARACT EVAL    HPI    Berta Nava 58 year old female       Interval hx 07/31/2024  Chief Complaint(s) and History of Present Illness(es)       Post Op (Ophthalmology) Right Eye     Additional comments: 3w Post Op, CE IOL right eye              Comments    Pt states right eye has a white low underneath bottom eyelid towards corner that appeared about 3-5 days ago. Right eye feels FB sensation and she is seeing a \"strand of hair\" come across vision especially when moving eyes left to right.  Right eye after surgery was doing well and improving but now she feels it is decreasing and becoming blurred. Floaters have improved and denies flashes. No other concerns at this moment. Denies using any gtts as of now.     Kiera Arvizu 2:24 PM  July 31, 2024                          Past ocular history   Prior eye surgery/laser/Trauma: -  CTL wearer:No  Glasses : -  Family Hx of eye disease: -    PMH     Past Medical History:   Diagnosis Date    Ascites     Biliary cirrhosis (H)     Cholangitis, sclerosing (H28)     Cirrhosis of liver with ascites (H) 03/03/2021    Hearing loss of left ear     wears a hearing aide    History of blood transfusion     History of low potassium     Hyperlipidemia     Hypertension     Liver transplant rejection (H) 05/17/2022    Mild Rejection    SBP (spontaneous bacterial peritonitis) (H) 04/30/2021    Sjogren's syndrome (H24)     Ulcerative colitis (H)     Ulcerative pancolitis (H)        PSH     Past Surgical History:   Procedure Laterality Date    BENCH LIVER N/A 11/18/2021    Procedure: Bench liver;  Surgeon: Ernesto Schmitt MD;  Location: [facility] OR    CATARACT IOL, RT/LT      COLONOSCOPY N/A 06/08/2022    Procedure: COLONOSCOPY, WITH BIOPSY;  Surgeon: Leventhal, Thomas Michael, MD;  Location: [facility] OR    COLONOSCOPY N/A 10/31/2022    Procedure: COLONOSCOPY, WITH POLYPECTOMY AND BIOPSY;  Surgeon: Kendrick Crawford MD;  Location: [facility] OR    COLONOSCOPY N/A " 10/31/2023    Procedure: Colonoscopy with biopsy and polypectomy;  Surgeon: Kendrick Crawford MD;  Location: [facility] OR    COMBINED ESOPHAGOSCOPY, GASTROSCOPY, DUODENOSCOPY (EGD), REMOVE BILIARY STENT N/A 02/09/2022    Procedure: ESOPHAGOGASTRODUODENOSCOPY, WITH BILIARY STENT REMOVAL;  Surgeon: Leventhal, Thomas Michael, MD;  Location: [facility] OR    CV CORONARY ANGIOGRAM N/A 08/25/2021    Procedure: Coronary Angiogram with possible intervention;  Surgeon: David Wilhelm MD;  Location:  [facility]    ENDOSCOPIC RETROGRADE CHOLANGIOPANCREATOGRAM N/A 06/25/2021    Procedure: ENDOSCOPIC RETROGRADE CHOLANGIOPANCREATOGRAPHY with ballon sweep of bile ducts for stones, balloon dilation of bile ducts and bile duct stent placement;  Surgeon: Gregory Gabriel MD;  Location:  OR    ENDOSCOPIC RETROGRADE CHOLANGIOPANCREATOGRAM N/A 07/22/2021    Procedure: ENDOSCOPIC RETROGRADE CHOLANGIOPANCREATOGRAPHY STONE REMOVAL, DILATION AND STENT PLACEMENT;  Surgeon: Gregory Gabriel MD;  Location:  OR    ENDOSCOPIC RETROGRADE CHOLANGIOPANCREATOGRAPHY, EXCHANGE TUBE/STENT N/A 05/05/2021    Procedure: ENDOSCOPIC RETROGRADE CHOLANGIOPANCREATOGRAPHY WITH STENT EXCHANGE, STONE EXTRACTION, AND DILATION;  Surgeon: Gregory Gabriel MD;  Location:  OR    ENDOSCOPIC RETROGRADE CHOLANGIOPANCREATOGRAPHY, EXCHANGE TUBE/STENT N/A 05/10/2021    Procedure: ENDOSCOPIC RETROGRADE CHOLANGIOPANCREATOGRAPHY with biliary dilation, stone removal, stent exchange;  Surgeon: Gregory Gabirel MD;  [facility]    ENDOSCOPIC RETROGRADE CHOLANGIOPANCREATOGRAPHY, EXCHANGE TUBE/STENT N/A 06/10/2021    Procedure: ENDOSCOPIC RETROGRADE CHOLANGIOPANCREATOGRAPHY, WITH biliary stent exchange, stone removal;  Surgeon: Woodrow Lott MD;  Location:  OR    ENDOSCOPIC RETROGRADE CHOLANGIOPANCREATOGRAPHY, EXCHANGE TUBE/STENT N/A 08/30/2021    Procedure: ENDOSCOPIC RETROGRADE CHOLANGIOPANCREATOGRAPHY with biliary dilation, stone removal, stent  exchange;  Surgeon: Gregory Gabriel MD;  Location: [facility]    ENDOSCOPIC RETROGRADE CHOLANGIOPANCREATOGRAPHY, EXCHANGE TUBE/STENT N/A 10/01/2021    Procedure: ENDOSCOPIC RETROGRADE CHOLANGIOPANCREATOGRAPHY with balloon sweep of bile ducts, bile duct stent exchanged;  Surgeon: Gregory Gabriel MD;  Location: [facility]    ESOPHAGOSCOPY, GASTROSCOPY, DUODENOSCOPY (EGD), COMBINED N/A 2021    Procedure: ESOPHAGOGASTRODUODENOSCOPY (EGD);  Surgeon: Leventhal, Thomas Michael, MD;  Location: [facility]    ESOPHAGOSCOPY, GASTROSCOPY, DUODENOSCOPY (EGD), COMBINED N/A 10/01/2021    Procedure: ESOPHAGOGASTRODUODENOSCOPY (EGD) with varices banding;  Surgeon: Gregory Gabriel MD;  Location: [facility] OR    GYN SURGERY      bilat fallopian tubes and ovaries removed    IR ERCP  2020    IR ERCP  2021    IR LIVER BIOPSY PERCUTANEOUS  2022    PHACOEMULSIFICATION CLEAR CORNEA WITH TORIC INTRAOCULAR LENS IMPLANT Right 2024    Procedure: RIGHT EYE PHACOEMULSIFICATION, CATARACT, WITH INTRAOCULAR LENS IMPLANT, TORIC LENS;  Surgeon: Dory Palmer MD;  Location: [facility] OR    PICC DOUBLE LUMEN PLACEMENT Right 2021    42cm (2cm external), Lateral brachial vein    RETURN LIVER TRANSPLANT N/A 2021    Procedure: CLOSURE, WOUND, ABDOMEN, SECONDARY, ABDOMINAL WASHOUT;  Surgeon: Ernesto Schmitt MD;  Location: [facility] OR    TRANSPLANT  2021    Liver due to [facility]    TRANSPLANT LIVER RECIPIENT  DONOR N/A 2021    Procedure: Opening of abdomen, Abdominal Exploration and aborted liver transplant.;  Surgeon: Ernesto Schmitt MD;  Location: [facility]    TRANSPLANT LIVER RECIPIENT  DONOR N/A 2021    Procedure: TRANSPLANT, LIVER, RECIPIENT,  DONOR;  Surgeon: Ernesto Schmitt MD;  Location: [facility]       Meds     Current Outpatient Medications   Medication Sig Dispense Refill    lisinopril (ZESTRIL) 10 MG tablet Take 1 tablet (10 mg) by mouth daily 90 tablet 3    moxifloxacin (VIGAMOX) 0.5 %  ophthalmic solution Place 1 drop into the right eye 4 times daily 3 mL 0    multivitamin (CENTRUM SILVER) tablet Take 1 tablet by mouth daily      tacrolimus (GENERIC) 0.5 MG capsule Take 1 capsule (0.5 mg) by mouth every 12 hours Total dose: 1.5 mg  capsule 3    tacrolimus (GENERIC) 1 MG capsule Take 1 capsule (1 mg) by mouth every 12 hours Total dose: 1.5 mg  capsule 3     No current facility-administered medications for this visit.       Labs   -    Imaging   -    Drops Currently Taking   -    Assessment/Plan 07/31/2024   #pseudophakia right eye 7/11/24 (near -2)  #posterior vitreous detachment, right eye   Good post op appearance; refracts to 20/20, J1+     07/31/24 States that things were great until 5 days ago when she started noticing a hair in her right vision that is floating around. Denies flashes. Exam notable for brinda positive sign without tears or holes on  360    #Senile Cataracts right eye>left eye (OK to delay left eye surgery for now)  Patient is having difficulty with daily activities due to visual impairment. Patient feels that they are no longer managing with current correction and would like to move forward with cataract surgery. Explained benefits alternatives and risks including but not limited to loss of vision, severe infection, retinal detachment, need for more surgery, visual disturbances etc...  Informed patient that reaching uncorrected vision aim (without glasses) after cataract surgery is not certain. Made patient aware they may need glasses, laser vision correction or in worst case scenario, IOL exchange.      Dilates well  no PXF  no Flomax  no guttae    -Aim: near right eye -2, distance left eye(os will not be scheduled yet)  - dominant eye  -Previous refractive surgery status:-  -Corneal astigmatism>1  Patient is okay to use toric lens OD  Start moxi after  Patient is s/p liver transplant      Follow up:  Oph: 1 year     Stephanie Deleon MD  PGY-3  Department of  Ophthalmology  July 31, 2024 3:58 PM     Attending Physician Attestation:  Complete documentation of historical and exam elements from today's encounter can be found in the full encounter summary report (not reduplicated in this progress note).  I personally obtained the chief complaint(s) and history of present illness.  I confirmed and edited as necessary the review of systems, past medical/surgical history, family history, social history, and examination findings as documented by others; and I examined the patient myself.  I personally reviewed the relevant tests, images, and reports as documented above.  I formulated and edited as necessary the assessment and plan and discussed the findings and management plan with the patient and family. - Dory Palmer MD

## 2024-09-06 ENCOUNTER — LAB (OUTPATIENT)
Dept: LAB | Facility: CLINIC | Age: 59
End: 2024-09-06
Payer: COMMERCIAL

## 2024-09-06 DIAGNOSIS — Z94.4 LIVER REPLACED BY TRANSPLANT (H): ICD-10-CM

## 2024-09-06 LAB
ALBUMIN SERPL BCG-MCNC: 4.2 G/DL (ref 3.5–5.2)
ALP SERPL-CCNC: 48 U/L (ref 40–150)
ALT SERPL W P-5'-P-CCNC: 13 U/L (ref 0–50)
ANION GAP SERPL CALCULATED.3IONS-SCNC: 10 MMOL/L (ref 7–15)
AST SERPL W P-5'-P-CCNC: 22 U/L (ref 0–45)
BILIRUB DIRECT SERPL-MCNC: <0.2 MG/DL (ref 0–0.3)
BILIRUB SERPL-MCNC: 0.4 MG/DL
BUN SERPL-MCNC: 17.4 MG/DL (ref 8–23)
CALCIUM SERPL-MCNC: 9.2 MG/DL (ref 8.8–10.4)
CHLORIDE SERPL-SCNC: 104 MMOL/L (ref 98–107)
CREAT SERPL-MCNC: 0.84 MG/DL (ref 0.51–0.95)
EGFRCR SERPLBLD CKD-EPI 2021: 79 ML/MIN/1.73M2
ERYTHROCYTE [DISTWIDTH] IN BLOOD BY AUTOMATED COUNT: 13.4 % (ref 10–15)
GLUCOSE SERPL-MCNC: 94 MG/DL (ref 70–99)
HCO3 SERPL-SCNC: 23 MMOL/L (ref 22–29)
HCT VFR BLD AUTO: 36.8 % (ref 35–47)
HGB BLD-MCNC: 13 G/DL (ref 11.7–15.7)
MAGNESIUM SERPL-MCNC: 1.8 MG/DL (ref 1.7–2.3)
MCH RBC QN AUTO: 29.6 PG (ref 26.5–33)
MCHC RBC AUTO-ENTMCNC: 35.3 G/DL (ref 31.5–36.5)
MCV RBC AUTO: 84 FL (ref 78–100)
PHOSPHATE SERPL-MCNC: 3.6 MG/DL (ref 2.5–4.5)
PLATELET # BLD AUTO: 179 10E3/UL (ref 150–450)
POTASSIUM SERPL-SCNC: 4.6 MMOL/L (ref 3.4–5.3)
PROT SERPL-MCNC: 7.1 G/DL (ref 6.4–8.3)
RBC # BLD AUTO: 4.39 10E6/UL (ref 3.8–5.2)
SODIUM SERPL-SCNC: 137 MMOL/L (ref 135–145)
TACROLIMUS BLD-MCNC: 3.2 UG/L (ref 5–15)
TME LAST DOSE: ABNORMAL H
TME LAST DOSE: ABNORMAL H
WBC # BLD AUTO: 7.9 10E3/UL (ref 4–11)

## 2024-09-06 PROCEDURE — 82248 BILIRUBIN DIRECT: CPT

## 2024-09-06 PROCEDURE — 80053 COMPREHEN METABOLIC PANEL: CPT

## 2024-09-06 PROCEDURE — 84100 ASSAY OF PHOSPHORUS: CPT

## 2024-09-06 PROCEDURE — 85027 COMPLETE CBC AUTOMATED: CPT

## 2024-09-06 PROCEDURE — 80197 ASSAY OF TACROLIMUS: CPT

## 2024-09-06 PROCEDURE — 83735 ASSAY OF MAGNESIUM: CPT

## 2024-09-06 PROCEDURE — 36415 COLL VENOUS BLD VENIPUNCTURE: CPT

## 2024-09-06 NOTE — ED NOTES
Pt has been napping, I went into her room to check mentation status - pt was easily woken up and is A AND O x4 .    Returned call to patient to schedule procedure (Port Placement) in [facility].  No blood thinners.  NKA  Procedure scheduled for 9/23/2024 @ 0900, patient to arrive 1st Floor Lab @ 0700. Patient instructed to proceed to 2nd Floor IR once labs are drawn.    Patient will receive sedation, will need a ride home.  Patient will need to go home with a responsible adult. For patient safety, the patient will not be allowed to drive himself home. Patient instructed to make necessary arrangements for transportation with family or friends. A taxi, bus or Uber is not acceptable transportation.    NPO 8 hours prior to the procedure.   Patient verbalized full understanding of above instructions. Department number provided for any further questions. [phone]

## 2024-09-09 ENCOUNTER — TELEPHONE (OUTPATIENT)
Dept: TRANSPLANT | Facility: CLINIC | Age: 59
End: 2024-09-09
Payer: COMMERCIAL

## 2024-09-09 DIAGNOSIS — K43.2 HERNIA, INCISIONAL: Primary | ICD-10-CM

## 2024-09-09 NOTE — TELEPHONE ENCOUNTER
Berta has an incisional hernia - she'd like to have dr. Schmitt take a look at it and advise.    Appt request placed.

## 2024-09-10 ENCOUNTER — TELEPHONE (OUTPATIENT)
Dept: GASTROENTEROLOGY | Facility: CLINIC | Age: 59
End: 2024-09-10
Payer: COMMERCIAL

## 2024-09-10 NOTE — TELEPHONE ENCOUNTER
"Endoscopy Scheduling Screen    Have you had a positive Covid test in the last 14 days?  No    What is your communication preference for Instructions and/or Bowel Prep?   MyChart    What insurance is in the chart?  Other:  [employer]    Ordering/Referring Provider: Kosta Crawford   (If ordering provider performs procedure, schedule with ordering provider unless otherwise instructed. )    BMI: Estimated body mass index is 26.63 kg/m  as calculated from the following:    Height as of 7/11/24: 1.676 m (5' 6\").    Weight as of 7/11/24: 74.8 kg (165 lb).     Sedation Ordered  MAC/deep sedation.   BMI<= 45 45 < BMI <= 48 48 < BMI < = 50  BMI > 50   No Restrictions No [facility]  No [facility]  [facility] with exceptions Hospital Only OR Only       Do you have a history of malignant hyperthermia?  No    (Females) Are you currently pregnant?   No     Have you been diagnosed or told you have pulmonary hypertension?   No    Do you have an LVAD?  No    Have you been told you have moderate to severe sleep apnea?  No    Have you been told you have COPD, asthma, or any other lung disease?  No    Do you have any heart conditions?  No     Have you ever had or are you waiting for an organ transplant?  Yes. Have you had or are on on the wait list for a heart/lung transplant? No, may schedule at all facilities except [facility]    Have you had a stroke or transient ischemic attack (TIA aka \"mini stroke\" in the last 6 months?   No    Have you been diagnosed with or been told you have cirrhosis of the liver?   No    Are you currently on dialysis?   No    Do you need assistance transferring?   No    BMI: Estimated body mass index is 26.63 kg/m  as calculated from the following:    Height as of 7/11/24: 1.676 m (5' 6\").    Weight as of 7/11/24: 74.8 kg (165 lb).     Is patients BMI > 40 and scheduling location [facility]?  No    Do you take an injectable medication for weight loss or diabetes (excluding insulin)?  No    Do you take the medication " Naltrexone?  No    Do you take blood thinners?  No       Prep   Are you currently on dialysis or do you have chronic kidney disease?  No    Do you have a diagnosis of diabetes?  No    Do you have a diagnosis of cystic fibrosis (CF)?  No    On a regular basis do you go 3 -5 days between bowel movements?  No    BMI > 40?  No    Preferred Pharmacy:        [id number] - [place], [place] - [address] [facility] [address]  [address]  [facility] [address]  Phone: [phone] Fax: [phone]          Final Scheduling Details     Procedure scheduled  Colonoscopy    Surgeon:  Ty     Date of procedure:  1/27/25     Pre-OP / PAC:   No - Not required for this site.    Location  [facility] - ASC - Per order.    Sedation   MAC/Deep Sedation - Per order.      Patient Reminders:   You will receive a call from a Nurse to review instructions and health history.  This assessment must be completed prior to your procedure.  Failure to complete the Nurse assessment may result in the procedure being cancelled.      On the day of your procedure, please designate an adult(s) who can drive you home stay with you for the next 24 hours. The medicines used in the exam will make you sleepy. You will not be able to drive.      You cannot take public transportation, ride share services, or non-medical taxi service without a responsible caregiver.  Medical transport services are allowed with the requirement that a responsible caregiver will receive you at your destination.  We require that drivers and caregivers are confirmed prior to your procedure.

## 2024-09-10 NOTE — PROGRESS NOTES
Discussed prior to transplantation:    Some donors have risk factors or behaviors that increase their chance of having an infection such as human immunodeficiency virus (HIV), hepatitis B virus (HBV), and/or hepatitis C virus (HCV). This donor has met one or more of the risk criteria in the last 30 days for these infections.  This donor was tested for HIV, HBV, and HCV, which resulted as negative, but as with every test, there is a small chance that the test result was negative even if the virus is present.     A negative test might happen if the donor had a very recent infection.  For this reason, we identify and discuss donors who have potential exposures for HIV, HBV, and/or HCV in the last month that might be missed by testing. The risk for undetected infections is very low but not zero.      Studies show that transplant candidates may have a higher chance of survival by accepting organs from donors with risk factors for these infections compared to waiting for an organ from a donor without these risk factors.      All transplant recipients are tested for HIV, HCV, and HBV after transplant.  In the very rare event that transmission would occur, there are effective therapies available, your medical team would collaborate with a team of infectious disease experts to treat you accordingly.     Additional Notes: Surgical Mortality Probability (SMPM) tool was used to calculate the patient's 30-day mortality risk index for non-cardiac surgery. This score is used to communicate risk information to patients prior to surgery and guide management at the point-of-care. This patient's specific risk calculation resulted in a score of 0-4pts which indicates <0.5% mortality rate and is low risk. No additional interventions or referrals are indicated. Quality 47: Advance Care Plan: Advance Care Planning discussed and documented in the medical record; patient did not wish or was not able to name a surrogate decision maker or provide an advance care plan. Name And Contact Information For Health Care Proxy: No health care proxy Quality 358: Patient-Centered Surgical Risk Assessment And Communication: Documentation of patient-specific risk assessment with a risk calculator based on multi-institutional clinical data, the specific risk calculator used, and communication of risk assessment from risk calculator with the patient or family. Quality 130: Documentation Of Current Medications In The Medical Record: Current Medications Documented Detail Level: Detailed

## 2024-09-23 ENCOUNTER — OFFICE VISIT (OUTPATIENT)
Dept: TRANSPLANT | Facility: CLINIC | Age: 59
End: 2024-09-23
Attending: TRANSPLANT SURGERY
Payer: COMMERCIAL

## 2024-09-23 VITALS
OXYGEN SATURATION: 99 % | DIASTOLIC BLOOD PRESSURE: 95 MMHG | RESPIRATION RATE: 16 BRPM | WEIGHT: 173.1 LBS | TEMPERATURE: 97.6 F | BODY MASS INDEX: 27.94 KG/M2 | SYSTOLIC BLOOD PRESSURE: 144 MMHG | HEART RATE: 78 BPM

## 2024-09-23 DIAGNOSIS — Z94.4 LIVER TRANSPLANTED (H): Primary | ICD-10-CM

## 2024-09-23 PROCEDURE — 99214 OFFICE O/P EST MOD 30 MIN: CPT | Performed by: TRANSPLANT SURGERY

## 2024-09-23 NOTE — PROGRESS NOTES
Transplant Surgery -OUTPATIENT IMMUNOSUPPRESSION PROGRESS NOTE    Date of Visit: 09/23/2024    Transplants:  11/18/2021 (Liver); Postoperative day:  1040  ASSESMENT AND PLAN:  1.Graft Function:Liver allograft: no rejection or technical problems.    2.Immunosuppression Management: tacrolimus monotherapy  3.Hypertension:needs monitoring  4.Renal Function:good  5.Lab frequency:monthly  6.Other:  Has incisional hernia on the left side of the transplant incision.  Recommend a CT abdomen without iv contrast to further evaluate the hernias    I explained the risk benefits and alternatives of mesh repair of incisonal hernia    Date: September 23, 2024    Transplant:  [x]                             Liver [x]                              Kidney []                             Pancreas []                              Other:             Chief Complaint:No chief complaint on file.  Bulge on the left side of the incision    History of Present Illness:  Patient Active Problem List   Diagnosis    Hypokalemia    Essential hypertension    Hyperlipidemia    Mixed hearing loss, bilateral    Neoplasm of uncertain behavior of skin    Sicca syndrome (H24)    Ulcerative pancolitis with complication (H)    Primary sclerosing cholangitis (H28)    Acute kidney failure, unspecified (H24)    Immunosuppressed status (H24)    S/P liver transplant (H)    Anemia due to chronic kidney disease    Liver transplant rejection (H)    Combined forms of age-related cataract of both eyes     SOCIAL /FAMILY HISTORY: [x]                  No recent change    Past Medical History:   Diagnosis Date    Ascites     Biliary cirrhosis (H)     Cholangitis, sclerosing (H28)     Cirrhosis of liver with ascites (H) 03/03/2021    Hearing loss of left ear     wears a hearing aide    History of blood transfusion     History of low potassium     Hyperlipidemia     Hypertension     Liver transplant rejection (H) 05/17/2022    Mild Rejection    SBP (spontaneous bacterial  peritonitis) (H) 04/30/2021    Sjogren's syndrome (H24)     Ulcerative colitis (H)     Ulcerative pancolitis (H)      Past Surgical History:   Procedure Laterality Date    BENCH LIVER N/A 11/18/2021    Procedure: Bench liver;  Surgeon: Ernesto Schmitt MD;  Location: [facility] OR    CATARACT IOL, RT/LT      COLONOSCOPY N/A 06/08/2022    Procedure: COLONOSCOPY, WITH BIOPSY;  Surgeon: Leventhal, Thomas Michael, MD;  Location: [facility] [place] OR    COLONOSCOPY N/A 10/31/2022    Procedure: COLONOSCOPY, WITH POLYPECTOMY AND BIOPSY;  Surgeon: Kendrick Crawford MD;  Location: [facility] [place] OR    COLONOSCOPY N/A 10/31/2023    Procedure: Colonoscopy with biopsy and polypectomy;  Surgeon: Kendrick Crawford MD;  Location: [facility] [place] OR    COMBINED ESOPHAGOSCOPY, GASTROSCOPY, DUODENOSCOPY (EGD), REMOVE BILIARY STENT N/A 02/09/2022    Procedure: ESOPHAGOGASTRODUODENOSCOPY, WITH BILIARY STENT REMOVAL;  Surgeon: Leventhal, Thomas Michael, MD;  Location: [facility] [place] OR    CV CORONARY ANGIOGRAM N/A 08/25/2021    Procedure: Coronary Angiogram with possible intervention;  Surgeon: David Wilhelm MD;  Location:  [facility]    ENDOSCOPIC RETROGRADE CHOLANGIOPANCREATOGRAM N/A 06/25/2021    Procedure: ENDOSCOPIC RETROGRADE CHOLANGIOPANCREATOGRAPHY with ballon sweep of bile ducts for stones, balloon dilation of bile ducts and bile duct stent placement;  Surgeon: Gregory Gabriel MD;  Location:  OR    ENDOSCOPIC RETROGRADE CHOLANGIOPANCREATOGRAM N/A 07/22/2021    Procedure: ENDOSCOPIC RETROGRADE CHOLANGIOPANCREATOGRAPHY STONE REMOVAL, DILATION AND STENT PLACEMENT;  Surgeon: Gregory Gabriel MD;  Location:  OR    ENDOSCOPIC RETROGRADE CHOLANGIOPANCREATOGRAPHY, EXCHANGE TUBE/STENT N/A 05/05/2021    Procedure: ENDOSCOPIC RETROGRADE CHOLANGIOPANCREATOGRAPHY WITH STENT EXCHANGE, STONE EXTRACTION, AND DILATION;  Surgeon: Gregory Gabriel MD;  [facility]    ENDOSCOPIC RETROGRADE CHOLANGIOPANCREATOGRAPHY, EXCHANGE TUBE/STENT N/A 05/10/2021     Procedure: ENDOSCOPIC RETROGRADE CHOLANGIOPANCREATOGRAPHY with biliary dilation, stone removal, stent exchange;  Surgeon: Gregory Gabriel MD;  Location: [facility] OR    ENDOSCOPIC RETROGRADE CHOLANGIOPANCREATOGRAPHY, EXCHANGE TUBE/STENT N/A 06/10/2021    Procedure: ENDOSCOPIC RETROGRADE CHOLANGIOPANCREATOGRAPHY, WITH biliary stent exchange, stone removal;  Surgeon: Woodrow Lott MD;  Location: [facility]    ENDOSCOPIC RETROGRADE CHOLANGIOPANCREATOGRAPHY, EXCHANGE TUBE/STENT N/A 08/30/2021    Procedure: ENDOSCOPIC RETROGRADE CHOLANGIOPANCREATOGRAPHY with biliary dilation, stone removal, stent exchange;  Surgeon: Gregory Gabriel MD;  Location: [facility]    ENDOSCOPIC RETROGRADE CHOLANGIOPANCREATOGRAPHY, EXCHANGE TUBE/STENT N/A 10/01/2021    Procedure: ENDOSCOPIC RETROGRADE CHOLANGIOPANCREATOGRAPHY with balloon sweep of bile ducts, bile duct stent exchanged;  Surgeon: Gregory Gabriel MD;  Location: [facility]    ESOPHAGOSCOPY, GASTROSCOPY, DUODENOSCOPY (EGD), COMBINED N/A 09/04/2021    Procedure: ESOPHAGOGASTRODUODENOSCOPY (EGD);  Surgeon: Leventhal, Thomas Michael, MD;  Location: [facility]    ESOPHAGOSCOPY, GASTROSCOPY, DUODENOSCOPY (EGD), COMBINED N/A 10/01/2021    Procedure: ESOPHAGOGASTRODUODENOSCOPY (EGD) with varices banding;  Surgeon: Gregory Gabriel MD;  Location: [facility] OR    GYN SURGERY      bilat fallopian tubes and ovaries removed    IR ERCP  12/04/2020    IR ERCP  01/06/2021    IR LIVER BIOPSY PERCUTANEOUS  05/17/2022    PHACOEMULSIFICATION CLEAR CORNEA WITH TORIC INTRAOCULAR LENS IMPLANT Right 07/11/2024    Procedure: RIGHT EYE PHACOEMULSIFICATION, CATARACT, WITH INTRAOCULAR LENS IMPLANT, TORIC LENS;  Surgeon: Dory Palmer MD;  Location: [facility] OR    PICC DOUBLE LUMEN PLACEMENT Right 05/08/2021    42cm (2cm external), Lateral brachial vein    RETURN LIVER TRANSPLANT N/A 11/19/2021    Procedure: CLOSURE, WOUND, ABDOMEN, SECONDARY, ABDOMINAL WASHOUT;  Surgeon: Ernesto Schmitt MD;  Location: [facility] OR    TRANSPLANT  11-     Liver due to PSC    TRANSPLANT LIVER RECIPIENT  DONOR N/A 2021    Procedure: Opening of abdomen, Abdominal Exploration and aborted liver transplant.;  Surgeon: Ernesto Schmitt MD;  Location: [facility] OR    TRANSPLANT LIVER RECIPIENT  DONOR N/A 2021    Procedure: TRANSPLANT, LIVER, RECIPIENT,  DONOR;  Surgeon: Ernesto Schmitt MD;  Location: [facility] OR     Social History     Socioeconomic History    Marital status:      Spouse name: Not on file    Number of children: Not on file    Years of education: Not on file    Highest education level: Not on file   Occupational History    Not on file   Tobacco Use    Smoking status: Never    Smokeless tobacco: Never   Vaping Use    Vaping status: Never Used   Substance and Sexual Activity    Alcohol use: No     Comment: Last drink was     Drug use: No    Sexual activity: Not Currently     Partners: Male     Birth control/protection: Post-menopausal   Other Topics Concern    Parent/sibling w/ CABG, MI or angioplasty before 65F 55M? Yes     Comment: Father had heart attack and received heart transplant   Social History Narrative    Not on file     Social Determinants of Health     Financial Resource Strain: Low Risk  (2024)    Financial Resource Strain     Within the past 12 months, have you or your family members you live with been unable to get utilities (heat, electricity) when it was really needed?: No   Food Insecurity: Low Risk  (2024)    Food Insecurity     Within the past 12 months, did you worry that your food would run out before you got money to buy more?: No     Within the past 12 months, did the food you bought just not last and you didn t have money to get more?: No   Transportation Needs: Low Risk  (2024)    Transportation Needs     Within the past 12 months, has lack of transportation kept you from medical appointments, getting your medicines, non-medical meetings or appointments, work, or from  getting things that you need?: No   Physical Activity: Sufficiently Active (5/20/2024)    Exercise Vital Sign     Days of Exercise per Week: 5 days     Minutes of Exercise per Session: 60 min   Stress: Stress Concern Present (5/20/2024)    [employer] - Occupational Stress Questionnaire     Feeling of Stress : To some extent   Social Connections: Unknown (5/20/2024)    Social Connection and Isolation Panel [NHANES]     Frequency of Communication with Friends and Family: Not on file     Frequency of Social Gatherings with Friends and Family: Once a week     Attends Muslim Services: Not on file     Active Member of Clubs or Organizations: Not on file     Attends Club or Organization Meetings: Not on file     Marital Status: Not on file   Interpersonal Safety: Low Risk  (5/21/2024)    Interpersonal Safety     Do you feel physically and emotionally safe where you currently live?: Yes     Within the past 12 months, have you been hit, slapped, kicked or otherwise physically hurt by someone?: No     Within the past 12 months, have you been humiliated or emotionally abused in other ways by your partner or ex-partner?: No   Housing Stability: Low Risk  (5/20/2024)    Housing Stability     Do you have housing? : Yes     Are you worried about losing your housing?: No     Prescription Medications as of 9/23/2024         Rx Number Disp Refills Start End Last Dispensed Date Next Fill Date Owning Pharmacy    lisinopril (ZESTRIL) 10 MG tablet  90 tablet 3 5/21/2024 --   [id number] - [place], [place] - [address] [place] [address] [address] AT [facility] [address]    Sig: Take 1 tablet (10 mg) by mouth daily    Class: E-Prescribe    Route: Oral    moxifloxacin (VIGAMOX) 0.5 % ophthalmic solution  3 mL 0 7/5/2024 --   [facility] [place], [place] - [address] [place] [address] [phone]    Sig: Place 1 drop into the right eye 4 times daily    Class: E-Prescribe    Route: Right Eye     multivitamin (CENTRUM SILVER) tablet  -- --  --       Sig: Take 1 tablet by mouth daily    Class: Historical    Route: Oral    tacrolimus (GENERIC) 0.5 MG capsule  180 capsule 3 12/12/2023 --   [facility] #[id number] [address] [place] [address]    Sig: Take 1 capsule (0.5 mg) by mouth every 12 hours Total dose: 1.5 mg BID    Class: E-Prescribe    Notes to Pharmacy: TXP DT 11/18/2021 (Liver) TXP Dischg DT 11/27/2021 DX Liver replaced by transplant Z94.4 TX [facility] ([place], [place])    Route: Oral    tacrolimus (GENERIC) 1 MG capsule  180 capsule 3 12/12/2023 --   [facility] #[id number] [address] [place] [address]    Sig: Take 1 capsule (1 mg) by mouth every 12 hours Total dose: 1.5 mg BID    Class: E-Prescribe    Notes to Pharmacy: TXP DT 11/18/2021 (Liver) TXP Dischg DT 11/27/2021 DX Liver replaced by transplant Z94.4 [facility] ([place], [place])    Route: Oral          Iodinated contrast media and Contrast dye   REVIEW OF SYSTEMS (check box if normal)  [x]               GENERAL  [x]                 PULMONARY [x]                GENITOURINARY  [x]                CNS                 [x]                 CARDIAC  [x]                 ENDOCRINE  [x]                EARS,NOSE,THROAT [x]                 GASTROINTESTINAL [x]                 NEUROLOGIC    [x]                MUSCLOSKELTAL  [x]                  HEMATOLOGY      PHYSICAL EXAM (check box if normal)LMP  (LMP Unknown)         [x]            GENERAL:    [x]       EYES:  ICTERIC   []        YES  []                    NO  [x]           EXTREMITIES: Clubbing []       Y     [x]           N    [x]           EARS, NOSE, THROAT: Membranes Moist    YES   [x]                   NO []                  [x]           LUNGS:  CLEAR    YES       [x]                  NO    []                                 [x]           SKIN: Jaundice           YES       []                  NO    [x]                   Rash: YES       []                  NO    [x]                                     [x]             HEART: Regular Rate          YES       [x]                  NO    []                   Incision Clean:  YES       [x]                  NO    []                                [x]                    ABDOMEN: 4 X 4 cm defect on the left side of the transplant incision lateraally Organomegaly YES       []                  NO    [x]                       [x]                    NEUROLOGICAL:  Nonfocal  YES       [x]                  NO    []                       [x]                    Hernia YES       []                  NO    [x]                   PSYCHIATRIC:  Appropriate  YES       [x]                  NO    []                       OTHER:                                                                                                   PAIN SCALE:: 3

## 2024-09-23 NOTE — LETTER
9/23/2024      Berta Nava  [address] [facility] [id number]      Dear Colleague,    Thank you for referring your patient, Berta Nava, to the [facility] TRANSPL[facility]. Please see a copy of my visit note below.    Transplant Surgery -OUTPATIENT IMMUNOSUPPRESSION PROGRESS NOTE    Date of Visit: 09/23/2024    Transplants:  11/18/2021 (Liver); Postoperative day:  1040  ASSESMENT AND PLAN:  1.Graft Function:Liver allograft: no rejection or technical problems.    2.Immunosuppression Management: tacrolimus monotherapy  3.Hypertension:needs monitoring  4.Renal Function:good  5.Lab frequency:monthly  6.Other:  Has incisional hernia on the left side of the transplant incision.  Recommend a CT abdomen without iv contrast to further evaluate the hernias    I explained the risk benefits and alternatives of mesh repair of incisonal hernia    Date: September 23, 2024    Transplant:  [x]                             Liver [x]                              Kidney []                             Pancreas []                              Other:             Chief Complaint:No chief complaint on file.  Bulge on the left side of the incision    History of Present Illness:  Patient Active Problem List   Diagnosis     Hypokalemia     Essential hypertension     Hyperlipidemia     Mixed hearing loss, bilateral     Neoplasm of uncertain behavior of skin     Sicca syndrome (H24)     Ulcerative pancolitis with complication (H)     Primary sclerosing cholangitis (H28)     Acute kidney failure, unspecified (H24)     Immunosuppressed status (H24)     S/P liver transplant (H)     Anemia due to chronic kidney disease     Liver transplant rejection (H)     Combined forms of age-related cataract of both eyes     SOCIAL /FAMILY HISTORY: [x]                  No recent change    Past Medical History:   Diagnosis Date     Ascites      Biliary cirrhosis (H)      Cholangitis, sclerosing (H28)      Cirrhosis of liver with ascites  (H) 03/03/2021     Hearing loss of left ear     wears a hearing aide     History of blood transfusion      History of low potassium      Hyperlipidemia      Hypertension      Liver transplant rejection (H) 05/17/2022    Mild Rejection     SBP (spontaneous bacterial peritonitis) (H) 04/30/2021     Sjogren's syndrome (H24)      Ulcerative colitis (H)      Ulcerative pancolitis (H)      Past Surgical History:   Procedure Laterality Date     BENCH LIVER N/A 11/18/2021    Procedure: Bench liver;  Surgeon: Ernesto Schmitt MD;  Location: [facility] OR     CATARACT IOL, RT/LT       COLONOSCOPY N/A 06/08/2022    Procedure: COLONOSCOPY, WITH BIOPSY;  Surgeon: Leventhal, Thomas Michael, MD;  Location: [facility] OR     COLONOSCOPY N/A 10/31/2022    Procedure: COLONOSCOPY, WITH POLYPECTOMY AND BIOPSY;  Surgeon: Kendrick Crawford MD;  Location: [facility] OR     COLONOSCOPY N/A 10/31/2023    Procedure: Colonoscopy with biopsy and polypectomy;  Surgeon: Kendrick Crawford MD;  Location: [facility] OR     COMBINED ESOPHAGOSCOPY, GASTROSCOPY, DUODENOSCOPY (EGD), REMOVE BILIARY STENT N/A 02/09/2022    Procedure: ESOPHAGOGASTRODUODENOSCOPY, WITH BILIARY STENT REMOVAL;  Surgeon: Leventhal, Thomas Michael, MD;  Location: [facility] OR     CV CORONARY ANGIOGRAM N/A 08/25/2021    Procedure: Coronary Angiogram with possible intervention;  Surgeon: David Wilhelm MD;  Location:  HEART CARDIAC CATH LAB     ENDOSCOPIC RETROGRADE CHOLANGIOPANCREATOGRAM N/A 06/25/2021    Procedure: ENDOSCOPIC RETROGRADE CHOLANGIOPANCREATOGRAPHY with ballon sweep of bile ducts for stones, balloon dilation of bile ducts and bile duct stent placement;  Surgeon: Gregory Gabriel MD;  Location:  OR     ENDOSCOPIC RETROGRADE CHOLANGIOPANCREATOGRAM N/A 07/22/2021    Procedure: ENDOSCOPIC RETROGRADE CHOLANGIOPANCREATOGRAPHY STONE REMOVAL, DILATION AND STENT PLACEMENT;  Surgeon: Gregory Gabriel MD;  Location:  OR     ENDOSCOPIC RETROGRADE CHOLANGIOPANCREATOGRAPHY,  EXCHANGE TUBE/STENT N/A 05/05/2021    Procedure: ENDOSCOPIC RETROGRADE CHOLANGIOPANCREATOGRAPHY WITH STENT EXCHANGE, STONE EXTRACTION, AND DILATION;  Surgeon: Gregory Gabriel MD;  [facility]     ENDOSCOPIC RETROGRADE CHOLANGIOPANCREATOGRAPHY, EXCHANGE TUBE/STENT N/A 05/10/2021    Procedure: ENDOSCOPIC RETROGRADE CHOLANGIOPANCREATOGRAPHY with biliary dilation, stone removal, stent exchange;  Surgeon: Gregory Gabriel MD;  Location: [facility] OR     ENDOSCOPIC RETROGRADE CHOLANGIOPANCREATOGRAPHY, EXCHANGE TUBE/STENT N/A 06/10/2021    Procedure: ENDOSCOPIC RETROGRADE CHOLANGIOPANCREATOGRAPHY, WITH biliary stent exchange, stone removal;  Surgeon: Woodrow Lott MD;  Location: [facility]     ENDOSCOPIC RETROGRADE CHOLANGIOPANCREATOGRAPHY, EXCHANGE TUBE/STENT N/A 08/30/2021    Procedure: ENDOSCOPIC RETROGRADE CHOLANGIOPANCREATOGRAPHY with biliary dilation, stone removal, stent exchange;  Surgeon: Gregory Gabriel MD;  Location: [facility]     ENDOSCOPIC RETROGRADE CHOLANGIOPANCREATOGRAPHY, EXCHANGE TUBE/STENT N/A 10/01/2021    Procedure: ENDOSCOPIC RETROGRADE CHOLANGIOPANCREATOGRAPHY with balloon sweep of bile ducts, bile duct stent exchanged;  Surgeon: Gregory Gabriel MD;  Location: [facility]     ESOPHAGOSCOPY, GASTROSCOPY, DUODENOSCOPY (EGD), COMBINED N/A 09/04/2021    Procedure: ESOPHAGOGASTRODUODENOSCOPY (EGD);  Surgeon: Leventhal, Thomas Michael, MD;  Location: [facility]     ESOPHAGOSCOPY, GASTROSCOPY, DUODENOSCOPY (EGD), COMBINED N/A 10/01/2021    Procedure: ESOPHAGOGASTRODUODENOSCOPY (EGD) with varices banding;  Surgeon: Gregory Gabriel MD;  Location: [facility] OR     GYN SURGERY      bilat fallopian tubes and ovaries removed     IR ERCP  12/04/2020     IR ERCP  01/06/2021     IR LIVER BIOPSY PERCUTANEOUS  05/17/2022     PHACOEMULSIFICATION CLEAR CORNEA WITH TORIC INTRAOCULAR LENS IMPLANT Right 07/11/2024    Procedure: RIGHT EYE PHACOEMULSIFICATION, CATARACT, WITH INTRAOCULAR LENS IMPLANT, TORIC LENS;  Surgeon: Dory Palmer MD;   Location: [facility] OR     PICC DOUBLE LUMEN PLACEMENT Right 2021    42cm (2cm external), Lateral brachial vein     RETURN LIVER TRANSPLANT N/A 2021    Procedure: CLOSURE, WOUND, ABDOMEN, SECONDARY, ABDOMINAL WASHOUT;  Surgeon: Ernesto Schmitt MD;  Location: [facility] OR     TRANSPLANT  2021    Liver due to PSC     TRANSPLANT LIVER RECIPIENT  DONOR N/A 2021    Procedure: Opening of abdomen, Abdominal Exploration and aborted liver transplant.;  Surgeon: Ernesto Schmitt MD;  Location: [facility]     TRANSPLANT LIVER RECIPIENT  DONOR N/A 2021    Procedure: TRANSPLANT, LIVER, RECIPIENT,  DONOR;  Surgeon: Ernesto Schmitt MD;  Location: [facility] OR     Social History     Socioeconomic History     Marital status:      Spouse name: Not on file     Number of children: Not on file     Years of education: Not on file     Highest education level: Not on file   Occupational History     Not on file   Tobacco Use     Smoking status: Never     Smokeless tobacco: Never   Vaping Use     Vaping status: Never Used   Substance and Sexual Activity     Alcohol use: No     Comment: Last drink was 2017     Drug use: No     Sexual activity: Not Currently     Partners: Male     Birth control/protection: Post-menopausal   Other Topics Concern     Parent/sibling w/ CABG, MI or angioplasty before 65F 55M? Yes     Comment: Father had heart attack and received heart transplant   Social History Narrative     Not on file     Social Determinants of Health     Financial Resource Strain: Low Risk  (2024)    Financial Resource Strain      Within the past 12 months, have you or your family members you live with been unable to get utilities (heat, electricity) when it was really needed?: No   Food Insecurity: Low Risk  (2024)    Food Insecurity      Within the past 12 months, did you worry that your food would run out before you got money to buy more?: No      Within the past 12 months, did  the food you bought just not last and you didn t have money to get more?: No   Transportation Needs: Low Risk  (5/20/2024)    Transportation Needs      Within the past 12 months, has lack of transportation kept you from medical appointments, getting your medicines, non-medical meetings or appointments, work, or from getting things that you need?: No   Physical Activity: Sufficiently Active (5/20/2024)    Exercise Vital Sign      Days of Exercise per Week: 5 days      Minutes of Exercise per Session: 60 min   Stress: Stress Concern Present (5/20/2024)    [employer] [place] [employer] - Occupational Stress Questionnaire      Feeling of Stress : To some extent   Social Connections: Unknown (5/20/2024)    Social Connection and Isolation Panel [NHANES]      Frequency of Communication with Friends and Family: Not on file      Frequency of Social Gatherings with Friends and Family: Once a week      Attends Sikh Services: Not on file      Active Member of Clubs or Organizations: Not on file      Attends Club or Organization Meetings: Not on file      Marital Status: Not on file   Interpersonal Safety: Low Risk  (5/21/2024)    Interpersonal Safety      Do you feel physically and emotionally safe where you currently live?: Yes      Within the past 12 months, have you been hit, slapped, kicked or otherwise physically hurt by someone?: No      Within the past 12 months, have you been humiliated or emotionally abused in other ways by your partner or ex-partner?: No   Housing Stability: Low Risk  (5/20/2024)    Housing Stability      Do you have housing? : Yes      Are you worried about losing your housing?: No     Prescription Medications as of 9/23/2024         Rx Number Disp Refills Start End Last Dispensed Date Next Fill Date Owning Pharmacy    lisinopril (ZESTRIL) 10 MG tablet  90 tablet 3 5/21/2024 --   [id number] - [place], [place] - [address] [place] [address] [address] AT [facility]    Sig:  Take 1 tablet (10 mg) by mouth daily    Class: E-Prescribe    Route: Oral    moxifloxacin (VIGAMOX) 0.5 % ophthalmic solution  3 mL 0 7/5/2024 --   [facility] [place], [place] - [address] [place] [address] [phone]    Sig: Place 1 drop into the right eye 4 times daily    Class: E-Prescribe    Route: Right Eye    multivitamin (CENTRUM SILVER) tablet  -- --  --       Sig: Take 1 tablet by mouth daily    Class: Historical    Route: Oral    tacrolimus (GENERIC) 0.5 MG capsule  180 capsule 3 12/12/2023 --   [facility] #[id number] [address] [place] [address]    Sig: Take 1 capsule (0.5 mg) by mouth every 12 hours Total dose: 1.5 mg BID    Class: E-Prescribe    Notes to Pharmacy: TXP DT 11/18/2021 (Liver) TXP Dischg DT 11/27/2021 DX Liver replaced by transplant Z94.4 [facility] ([place], [place])    Route: Oral    tacrolimus (GENERIC) 1 MG capsule  180 capsule 3 12/12/2023 --   [facility] #[id number] [address] [place] [address]    Sig: Take 1 capsule (1 mg) by mouth every 12 hours Total dose: 1.5 mg BID    Class: E-Prescribe    Notes to Pharmacy: TXP DT 11/18/2021 (Liver) TXP Dischg DT 11/27/2021 DX Liver replaced by transplant Z94.4 [facility] ([place], [place])    Route: Oral          Iodinated contrast media and Contrast dye   REVIEW OF SYSTEMS (check box if normal)  [x]               GENERAL  [x]                 PULMONARY [x]                GENITOURINARY  [x]                CNS                 [x]                 CARDIAC  [x]                 ENDOCRINE  [x]                EARS,NOSE,THROAT [x]                 GASTROINTESTINAL [x]                 NEUROLOGIC    [x]                MUSCLOSKELTAL  [x]                  HEMATOLOGY      PHYSICAL EXAM (check box if normal)LMP  (LMP Unknown)         [x]            GENERAL:    [x]        EYES:  ICTERIC   []        YES  []                    NO  [x]           EXTREMITIES: Clubbing []       Y     [x]           N    [x]           EARS, NOSE, THROAT: Membranes Moist    YES   [x]                   NO []                  [x]           LUNGS:  CLEAR    YES       [x]                  NO    []                                [x]           SKIN: Jaundice           YES       []                  NO    [x]                   Rash: YES       []                  NO    [x]                                     [x]             HEART: Regular Rate          YES       [x]                  NO    []                   Incision Clean:  YES       [x]                  NO    []                                [x]                    ABDOMEN: 4 X 4 cm defect on the left side of the transplant incision lateraally Organomegaly YES       []                  NO    [x]                       [x]                    NEUROLOGICAL:  Nonfocal  YES       [x]                  NO    []                       [x]                    Hernia YES       []                  NO    [x]                   PSYCHIATRIC:  Appropriate  YES       [x]                  NO    []                       OTHER:                                                                                                   PAIN SCALE:: 3       Again, thank you for allowing me to participate in the care of your patient.        Sincerely,        Ernesto Schmitt MD

## 2024-09-23 NOTE — NURSING NOTE
"Chief Complaint   Patient presents with    Follow Up     Hernia Consult     Vital signs:  Temp: 97.6  F (36.4  C) Temp src: Oral BP: (!) 144/95 Pulse: 78   Resp: 16 SpO2: 99 %       Weight: 78.5 kg (173 lb 1.6 oz)  Estimated body mass index is 27.94 kg/m  as calculated from the following:    Height as of 7/11/24: 1.676 m (5' 6\").    Weight as of this encounter: 78.5 kg (173 lb 1.6 oz).      Ag Burger RN on 9/23/2024 at 11:59 AM    "

## 2024-10-02 ENCOUNTER — HOSPITAL ENCOUNTER (OUTPATIENT)
Dept: CT IMAGING | Facility: CLINIC | Age: 59
Discharge: HOME | End: 2024-10-02
Attending: TRANSPLANT SURGERY | Admitting: TRANSPLANT SURGERY
Payer: COMMERCIAL

## 2024-10-02 PROCEDURE — 74176 CT ABD & PELVIS W/O CONTRAST: CPT

## 2024-10-08 ENCOUNTER — PREP FOR PROCEDURE (OUTPATIENT)
Dept: TRANSPLANT | Facility: CLINIC | Age: 59
End: 2024-10-08
Payer: COMMERCIAL

## 2024-10-08 DIAGNOSIS — K43.2 HERNIA, INCISIONAL: Primary | ICD-10-CM

## 2024-10-10 NOTE — TELEPHONE ENCOUNTER
FUTURE VISIT INFORMATION      SURGERY INFORMATION:  Date: 24  [facility]  Surgeon:  Ernesto Schmitt MD   Anesthesia Type:  general with block  Procedure: HERNIORRHAPHY, INCISIONAL, OPEN     RECORDS REQUESTED FROM:       Primary Care Provider: [facility]    Pertinent Medical History: hypertension    Most recent EKG+ Tracin21    Most recent PFT's: 20- [facility]

## 2024-10-22 ENCOUNTER — ANESTHESIA EVENT (OUTPATIENT)
Dept: SURGERY | Facility: CLINIC | Age: 59
End: 2024-10-22
Payer: COMMERCIAL

## 2024-10-22 ENCOUNTER — VIRTUAL VISIT (OUTPATIENT)
Dept: SURGERY | Facility: CLINIC | Age: 59
End: 2024-10-22
Payer: COMMERCIAL

## 2024-10-22 ENCOUNTER — PRE VISIT (OUTPATIENT)
Dept: SURGERY | Facility: CLINIC | Age: 59
End: 2024-10-22

## 2024-10-22 ENCOUNTER — TELEPHONE (OUTPATIENT)
Dept: TRANSPLANT | Facility: CLINIC | Age: 59
End: 2024-10-22

## 2024-10-22 VITALS — BODY MASS INDEX: 28.82 KG/M2 | HEIGHT: 65 IN | WEIGHT: 173 LBS

## 2024-10-22 DIAGNOSIS — Z01.818 PRE-OPERATIVE EXAMINATION: Primary | ICD-10-CM

## 2024-10-22 DIAGNOSIS — K43.2 INCISIONAL HERNIA, WITHOUT OBSTRUCTION OR GANGRENE: ICD-10-CM

## 2024-10-22 PROCEDURE — 99203 OFFICE O/P NEW LOW 30 MIN: CPT | Mod: 95 | Performed by: PHYSICIAN ASSISTANT

## 2024-10-22 ASSESSMENT — LIFESTYLE VARIABLES: TOBACCO_USE: 0

## 2024-10-22 ASSESSMENT — ENCOUNTER SYMPTOMS: SEIZURES: 0

## 2024-10-22 NOTE — PATIENT INSTRUCTIONS
Preparing for Your Surgery      Name:  Berta Nava   MRN:  [id number]   :  1965   Today's Date:  10/22/2024       Arriving for surgery:  Surgery date:  24  Arrival time:  11:15 am      Please come to:      [facility]    [address] [place]  [address]     The [facility] Patient/Visitor Ramp is at [address]. Patients and visitors who self-park will receive the reduced hospital parking rate. If the Patient /Visitor Ramp is full, please follow the signs to the [facility] car park located at the main hospital entrance.       parking is available (24 hours/ 7 days a week)      Discounted parking pass options are available for patients and visitors. They can be purchased at the "[employer] desk at the [facility] hospital entrance.     -    Stop at the security desk and they will direct surgery patients to the Surgery Check in and Family Lounge. [phone]        - If you need directions, a wheelchair or an escort please stop at the Information/security desk in the lobby.     What can I eat or drink?  -  You may eat and drink normally up to 8 hours prior to arrival time.   -  You may have clear liquids until 2 hours prior to arrival time.    Examples of clear liquids:  Water  Clear broth  Juices (apple, white grape, white cranberry  and cider) without pulp  Noncarbonated, powder based beverages  (lemonade and Von-Aid)  Sodas (Sprite, 7-Up, ginger ale and seltzer)  Coffee or tea (without milk or cream)  Gatorade    -  No Alcohol or cannabis products for at least 24 hours before surgery.     Which medicines can I take?    Hold Aspirin for 7 days before surgery.   Hold Multivitamins for 7 days before surgery.  Hold Supplements for 7 days before surgery.  Hold Ibuprofen (Advil, Motrin) for 1 day(s) before surgery--unless otherwise directed by surgeon.  Hold Naproxen (Aleve) for 4  days before surgery.    -  DO NOT take these medications the day of surgery:  Lisinopril.    -  PLEASE TAKE these medications the day of surgery:   Tacrolimus.    How do I prepare myself?  - Please take 2 showers (one the night prior to surgery and one the morning of surgery) using Scrubcare or Hibiclens soap.    Use this soap only from the neck to your toes.     Leave the soap on your skin for one minute--then rinse thoroughly.      You may use your own shampoo and conditioner. No other hair products.   - Please remove all jewelry and body piercings.  - No lotions, deodorants or fragrance.  - No makeup or fingernail polish.   - Bring your ID and insurance card.    -If you use a CPAP machine, please bring the CPAP machine, tubing, and mask to hospital.    -If you have a Deep Brain Stimulator, Spinal Cord Stimulator, or any Neuro Stimulator device---you must bring the remote control to the hospital.      ALL PATIENTS GOING HOME THE SAME DAY OF SURGERY ARE REQUIRED TO HAVE A RESPONSIBLE ADULT TO DRIVE AND BE IN ATTENDANCE WITH THEM FOR 24 HOURS FOLLOWING SURGERY.    Covid testing policy as of 12/06/2022  Your surgeon will notify and schedule you for a COVID test if one is needed before surgery--please direct any questions or COVID symptoms to your surgeon      Questions or Concerns:    - For any questions regarding the day of surgery or your hospital stay, please contact the Pre Admission Nursing Office at [phone].       - If you have health changes between today and your surgery, please call your surgeon.       - For questions after surgery, please call your surgeons office.           Current Visitor Guidelines    You may have 2 visitors in the pre op area.    Visiting hours: 8 a.m. to 8:30 p.m.    Patients confirmed or suspected to have symptoms of COVID 19 or flu:     No visitors allowed for adult patients.   Children (under age 18) can have 1 named visitor.     People who are sick or showing symptoms of COVID  19 or flu:    Are not allowed to visit patients--we can only make exceptions in special situations.       Please follow these guidelines for your visit:          Please maintain social distance          Masking is optional--however at times you may be asked to wear a mask for the safety of yourself and others     Clean your hands with alcohol hand . Do this when you arrive at and leave the building and patient room,    And again after you touch your mask or anything in the room.     Go directly to and from the room you are visiting.     Stay in the patient s room during your visit. Limit going to other places in the hospital as much as possible     Leave bags and jackets at home or in the car.     For everyone s health, please don t come and go during your visit. That includes for smoking   during your visit.

## 2024-10-22 NOTE — PROGRESS NOTES
Berta is a 59 year old who is being evaluated via a billable video visit.      Loi Bryant   Berta is a 59 year old, presenting for the following health issues:  Pre-Op Exam (/)        MARTINEZ Kelley LPN

## 2024-10-22 NOTE — H&P
Pre-Operative H & P     CC:  Preoperative exam to assess for increased cardiopulmonary risk while undergoing surgery and anesthesia.    Date of Encounter: 10/22/2024  Primary Care Physician:  Afshan Penaloza     Reason for visit:   Encounter Diagnoses   Name Primary?    Pre-operative examination Yes    Incisional hernia, without obstruction or gangrene        HPI  Berta Nava is a 59 year old female who presents for pre-operative H & P in preparation for  Procedure Information       Case: [id number] Date/Time: 11/06/24 1345    Procedure: open incisional hernia repair (Left: Update)    Anesthesia type: General with Block    Diagnosis: Hernia, incisional [K43.2]    Pre-op diagnosis: Hernia, incisional [K43.2]    Location:  OR [address] [place] [address] OR    Providers: Ernesto Schmitt MD            The patient is a 59-year-old woman with a past medical history significant for hypertension, hyperlipidemia, hearing loss, history of primary sclerosing cholangitis status post liver transplant, history of colon polyp, history of transfusion, history of pancreatic mucinous cystic adenoma, history of microscopic colitis, history of ulcerative pancolitis, Sjogren's disease and a history of skin cancer.  The patient was seen by Dr. Schmitt on 9/23/2024 for concerns of her hernia and is now scheduled for the procedure as above.    History is obtained from the patient and chart review    Hx of abnormal bleeding or anti-platelet use: none    Menstrual history: No LMP recorded (lmp unknown). Patient is postmenopausal.:      Past Medical History  Past Medical History:   Diagnosis Date    Ascites     Biliary cirrhosis (H)     Cholangitis, sclerosing (H)     Cirrhosis of liver with ascites (H) 03/03/2021    Hearing loss of left ear     wears a hearing aide    History of blood transfusion     History of low potassium     Hyperlipidemia     Hypertension     Liver transplant rejection (H) 05/17/2022    Mild Rejection    SBP (spontaneous  bacterial peritonitis) (H) 04/30/2021    Sjogren's syndrome (H)     Ulcerative colitis (H)     Ulcerative pancolitis (H)        Past Surgical History  Past Surgical History:   Procedure Laterality Date    BENCH LIVER N/A 11/18/2021    Procedure: Bench liver;  Surgeon: Ernesto Schmitt MD;  Location: [facility] OR    CATARACT IOL, RT/LT      COLONOSCOPY N/A 06/08/2022    Procedure: COLONOSCOPY, WITH BIOPSY;  Surgeon: Leventhal, Thomas Michael, MD;  Location: [facility] OR    COLONOSCOPY N/A 10/31/2022    Procedure: COLONOSCOPY, WITH POLYPECTOMY AND BIOPSY;  Surgeon: Kendrick Crawford MD;  Location: [facility] OR    COLONOSCOPY N/A 10/31/2023    Procedure: Colonoscopy with biopsy and polypectomy;  Surgeon: Kendrick Crawford MD;  Location: [facility] OR    COMBINED ESOPHAGOSCOPY, GASTROSCOPY, DUODENOSCOPY (EGD), REMOVE BILIARY STENT N/A 02/09/2022    Procedure: ESOPHAGOGASTRODUODENOSCOPY, WITH BILIARY STENT REMOVAL;  Surgeon: Leventhal, Thomas Michael, MD;  Location: [facility] OR    CV CORONARY ANGIOGRAM N/A 08/25/2021    Procedure: Coronary Angiogram with possible intervention;  Surgeon: David Wilhelm MD;  Location:  HEART CARDIAC CATH LAB    ENDOSCOPIC RETROGRADE CHOLANGIOPANCREATOGRAM N/A 06/25/2021    Procedure: ENDOSCOPIC RETROGRADE CHOLANGIOPANCREATOGRAPHY with ballon sweep of bile ducts for stones, balloon dilation of bile ducts and bile duct stent placement;  Surgeon: Gregory Gabriel MD;  Location:  OR    ENDOSCOPIC RETROGRADE CHOLANGIOPANCREATOGRAM N/A 07/22/2021    Procedure: ENDOSCOPIC RETROGRADE CHOLANGIOPANCREATOGRAPHY STONE REMOVAL, DILATION AND STENT PLACEMENT;  Surgeon: Gregory Gabriel MD;  Location:  OR    ENDOSCOPIC RETROGRADE CHOLANGIOPANCREATOGRAPHY, EXCHANGE TUBE/STENT N/A 05/05/2021    Procedure: ENDOSCOPIC RETROGRADE CHOLANGIOPANCREATOGRAPHY WITH STENT EXCHANGE, STONE EXTRACTION, AND DILATION;  Surgeon: Gregory Gabriel MD;  Location:  OR    ENDOSCOPIC RETROGRADE CHOLANGIOPANCREATOGRAPHY,  EXCHANGE TUBE/STENT N/A 05/10/2021    Procedure: ENDOSCOPIC RETROGRADE CHOLANGIOPANCREATOGRAPHY with biliary dilation, stone removal, stent exchange;  Surgeon: Gregory Gabriel MD;  Location: [facility] OR    ENDOSCOPIC RETROGRADE CHOLANGIOPANCREATOGRAPHY, EXCHANGE TUBE/STENT N/A 06/10/2021    Procedure: ENDOSCOPIC RETROGRADE CHOLANGIOPANCREATOGRAPHY, WITH biliary stent exchange, stone removal;  Surgeon: Woodrow Lott MD;  Location: [facility] OR    ENDOSCOPIC RETROGRADE CHOLANGIOPANCREATOGRAPHY, EXCHANGE TUBE/STENT N/A 08/30/2021    Procedure: ENDOSCOPIC RETROGRADE CHOLANGIOPANCREATOGRAPHY with biliary dilation, stone removal, stent exchange;  Surgeon: Gregory Gabriel MD;  Location: [facility] OR    ENDOSCOPIC RETROGRADE CHOLANGIOPANCREATOGRAPHY, EXCHANGE TUBE/STENT N/A 10/01/2021    Procedure: ENDOSCOPIC RETROGRADE CHOLANGIOPANCREATOGRAPHY with balloon sweep of bile ducts, bile duct stent exchanged;  Surgeon: Gregory Gabriel MD;  Location: [facility] OR    ESOPHAGOSCOPY, GASTROSCOPY, DUODENOSCOPY (EGD), COMBINED N/A 09/04/2021    Procedure: ESOPHAGOGASTRODUODENOSCOPY (EGD);  Surgeon: Leventhal, Thomas Michael, MD;  Location: [facility] GI    ESOPHAGOSCOPY, GASTROSCOPY, DUODENOSCOPY (EGD), COMBINED N/A 10/01/2021    Procedure: ESOPHAGOGASTRODUODENOSCOPY (EGD) with varices banding;  Surgeon: Gregory Gabriel MD;  Location: [facility] OR    GYN SURGERY      bilat fallopian tubes and ovaries removed    IR ERCP  12/04/2020    IR ERCP  01/06/2021    IR LIVER BIOPSY PERCUTANEOUS  05/17/2022    PHACOEMULSIFICATION CLEAR CORNEA WITH TORIC INTRAOCULAR LENS IMPLANT Right 07/11/2024    Procedure: RIGHT EYE PHACOEMULSIFICATION, CATARACT, WITH INTRAOCULAR LENS IMPLANT, TORIC LENS;  Surgeon: Dory Palmer MD;  Location: [facility] OR    PICC DOUBLE LUMEN PLACEMENT Right 05/08/2021    42cm (2cm external), Lateral brachial vein    RETURN LIVER TRANSPLANT N/A 11/19/2021    Procedure: CLOSURE, WOUND, ABDOMEN, SECONDARY, ABDOMINAL WASHOUT;  Surgeon: Ernesto Schmitt MD;   Location: [facility] OR    TRANSPLANT  2021    Liver due to PSC    TRANSPLANT LIVER RECIPIENT  DONOR N/A 2021    Procedure: Opening of abdomen, Abdominal Exploration and aborted liver transplant.;  Surgeon: Ernesto Schmitt MD;  Location: [facility] OR    TRANSPLANT LIVER RECIPIENT  DONOR N/A 2021    Procedure: TRANSPLANT, LIVER, RECIPIENT,  DONOR;  Surgeon: Ernesto Schmitt MD;  Location: [facility] OR       Prior to Admission Medications  Current Outpatient Medications   Medication Sig Dispense Refill    lisinopril (ZESTRIL) 10 MG tablet Take 1 tablet (10 mg) by mouth daily (Patient taking differently: Take 10 mg by mouth every morning.) 90 tablet 3    multivitamin (CENTRUM SILVER) tablet Take 1 tablet by mouth every morning.      tacrolimus (GENERIC) 0.5 MG capsule Take 1 capsule (0.5 mg) by mouth every 12 hours Total dose: 1.5 mg BID (Patient taking differently: Take 0.5 mg by mouth 2 times daily. Total dose: 1.5 mg BID) 180 capsule 3    tacrolimus (GENERIC) 1 MG capsule Take 1 capsule (1 mg) by mouth every 12 hours Total dose: 1.5 mg BID (Patient taking differently: Take 1 mg by mouth 2 times daily. Total dose: 1.5 mg BID) 180 capsule 3       Allergies  Allergies   Allergen Reactions    Iodinated Contrast Media Hives     PATIENT HAD HIVE REACTION AFTER ADMINISTERING CT CONTRAST DYE.      Contrast Dye        Social History  Social History     Socioeconomic History    Marital status:      Spouse name: Not on file    Number of children: Not on file    Years of education: Not on file    Highest education level: Not on file   Occupational History    Not on file   Tobacco Use    Smoking status: Never     Passive exposure: Never    Smokeless tobacco: Never   Vaping Use    Vaping status: Never Used   Substance and Sexual Activity    Alcohol use: No     Comment: Last drink was     Drug use: No    Sexual activity: Not Currently     Partners: Male     Birth control/protection:  Post-menopausal   Other Topics Concern    Parent/sibling w/ CABG, MI or angioplasty before 65F 55M? Yes     Comment: Father had heart attack and received heart transplant   Social History Narrative    Not on file     Social Determinants of Health     Financial Resource Strain: Low Risk  (5/20/2024)    Financial Resource Strain     Within the past 12 months, have you or your family members you live with been unable to get utilities (heat, electricity) when it was really needed?: No   Food Insecurity: Low Risk  (5/20/2024)    Food Insecurity     Within the past 12 months, did you worry that your food would run out before you got money to buy more?: No     Within the past 12 months, did the food you bought just not last and you didn t have money to get more?: No   Transportation Needs: Low Risk  (5/20/2024)    Transportation Needs     Within the past 12 months, has lack of transportation kept you from medical appointments, getting your medicines, non-medical meetings or appointments, work, or from getting things that you need?: No   Physical Activity: Sufficiently Active (5/20/2024)    Exercise Vital Sign     Days of Exercise per Week: 5 days     Minutes of Exercise per Session: 60 min   Stress: Stress Concern Present (5/20/2024)    [employer] [place] [employer] - Occupational Stress Questionnaire     Feeling of Stress : To some extent   Social Connections: Unknown (5/20/2024)    Social Connection and Isolation Panel [NHANES]     Frequency of Communication with Friends and Family: Not on file     Frequency of Social Gatherings with Friends and Family: Once a week     Attends [employer] Services: Not on file     Active Member of Clubs or Organizations: Not on file     Attends Club or Organization Meetings: Not on file     Marital Status: Not on file   Interpersonal Safety: Low Risk  (5/21/2024)    Interpersonal Safety     Do you feel physically and emotionally safe where you currently live?: Yes     Within the  past 12 months, have you been hit, slapped, kicked or otherwise physically hurt by someone?: No     Within the past 12 months, have you been humiliated or emotionally abused in other ways by your partner or ex-partner?: No   Housing Stability: Low Risk  (5/20/2024)    Housing Stability     Do you have housing? : Yes     Are you worried about losing your housing?: No       Family History  Family History   Problem Relation Age of Onset    Cancer Mother         angiosarcoma    Hypertension Mother     Hyperlipidemia Mother     Other Cancer Mother     Coronary Artery Disease Early Onset Father         MI age 46    Heart Transplant Father     Coronary Artery Disease Father     Melanoma Sister     Skin Cancer Sister     Liver Disease No family hx of     Ulcerative Colitis No family hx of     Crohn's Disease No family hx of     Glaucoma No family hx of     Macular Degeneration No family hx of     Anesthesia Reaction No family hx of     Deep Vein Thrombosis (DVT) No family hx of        Review of Systems  The complete review of systems is negative other than noted in the HPI or here.   Anesthesia Evaluation   Pt has had prior anesthetic. Type: General and MAC.        ROS/MED HX  ENT/Pulmonary: Comment: Hearing loss   (-) tobacco use   Neurologic:  - neg neurologic ROS  (-) no seizures, no CVA and no TIA   Cardiovascular:     (+) Dyslipidemia hypertension- -   -  - -                                 Previous cardiac testing   Echo: Date: Results:    Stress Test:  Date: 2021 Results:  Interpretation Summary  Dobutamine stress echocardiogram with no inducible ischemia.     Target heart rate achieved. Hypotensive response to dobutamine  No angina symptoms during stress test.  No ECG evidence of ischemia at rest or with dobutamine.  Normal segmental and global left ventricular systolic function at baseline,  LVEF 55-60%.  With peak dobutamine, LVEF increased further to >70% and LV cavity size  decreased appropriately.  No stress  induced regional wall motion abnormalities.     Normal aortic root and no significant valvular dysfunction noted on screening  2D and Doppler examination.  ECG Reviewed:  Date: 2021 Results:  Sinus rhythm with sinus arrhythmia   Rightward axis   Incomplete right bundle branch block   Borderline ECG     Cath:  Date: 2021 Results:      METS/Exercise Tolerance: >4 METS    Hematologic:     (+)       history of blood transfusion, no previous transfusion reaction,     (-) history of blood clots and anemia   Musculoskeletal:  - neg musculoskeletal ROS     GI/Hepatic: Comment: Incisional hernia     History of colon polyp  Pancreatic mucinous cystadenoma  Microscopic colitis  Ulcerative pan colitis     (+)             liver disease (history of primary sclerosing cholangitis s/p liver transplant 2021),    (-) GERD   Renal/Genitourinary:       Endo:  - neg endo ROS     Psychiatric/Substance Use:  - neg psychiatric ROS     Infectious Disease:     (+)    VRE,        Malignancy:   (+) Malignancy, History of Skin.    Other: Comment: Sjogren's disease           Virtual visit -  No vitals were obtained    Physical Exam  Constitutional: Awake, alert, cooperative, no apparent distress, and appears stated age.  Eyes: Pupils equal  HENT: Normocephalic  Respiratory: non labored breathing   Neurologic: Awake, alert, oriented to name, place and time.   Neuropsychiatric: Calm, cooperative. Normal affect.      Prior Labs/Diagnostic Studies   All labs and imaging personally reviewed    Latest Reference Range & Units 09/06/24 08:34   Sodium 135 - 145 mmol/L 137   Potassium 3.4 - 5.3 mmol/L 4.6   Chloride 98 - 107 mmol/L 104   Carbon Dioxide (CO2) 22 - 29 mmol/L 23   Urea Nitrogen 8.0 - 23.0 mg/dL 17.4   Creatinine 0.51 - 0.95 mg/dL 0.84   GFR Estimate >60 mL/min/1.73m2 79   Calcium 8.8 - 10.4 mg/dL 9.2   Anion Gap 7 - 15 mmol/L 10   Magnesium 1.7 - 2.3 mg/dL 1.8   Phosphorus 2.5 - 4.5 mg/dL 3.6   Albumin 3.5 - 5.2 g/dL 4.2   Protein Total 6.4  - 8.3 g/dL 7.1   Alkaline Phosphatase 40 - 150 U/L 48   ALT 0 - 50 U/L 13   AST 0 - 45 U/L 22   Bilirubin Direct 0.00 - 0.30 mg/dL <0.20   Bilirubin Total <=1.2 mg/dL 0.4   Glucose 70 - 99 mg/dL 94   WBC 4.0 - 11.0 10e3/uL 7.9   Hemoglobin 11.7 - 15.7 g/dL 13.0   Hematocrit 35.0 - 47.0 % 36.8   Platelet Count 150 - 450 10e3/uL 179   RBC Count 3.80 - 5.20 10e6/uL 4.39   MCV 78 - 100 fL 84   MCH 26.5 - 33.0 pg 29.6   MCHC 31.5 - 36.5 g/dL 35.3   RDW 10.0 - 15.0 % 13.4       EKG 2021  Sinus rhythm with sinus arrhythmia   Rightward axis   Incomplete right bundle branch block   Borderline ECG       Coronary angiogram 8/18/21    Conclusion    Normal coronaries.    Cardiac MRI 8/18/21  CONCLUSIONS: Normal cardiac function without evidence of fibrosis, aneurysm, or other high-risk  morphological features to suggest the cause of the broad-complex tachycardia.     DSE 4/20/21  Interpretation Summary  Dobutamine stress echocardiogram with no inducible ischemia.     Target heart rate achieved. Hypotensive response to dobutamine  No angina symptoms during stress test.  No ECG evidence of ischemia at rest or with dobutamine.  Normal segmental and global left ventricular systolic function at baseline,  LVEF 55-60%.  With peak dobutamine, LVEF increased further to >70% and LV cavity size  decreased appropriately.  No stress induced regional wall motion abnormalities.     Normal aortic root and no significant valvular dysfunction noted on screening  2D and Doppler examination.        The patient's records and results personally reviewed by this provider.     Outside records reviewed from: Care Everywhere      Assessment    Berta Nava is a 59 year old female seen as a PAC referral for risk assessment and optimization for anesthesia.    Plan/Recommendations  Pt will be optimized for the proposed procedure.  See below for details on the assessment, risk, and preoperative recommendations    NEUROLOGY  - No history of TIA, CVA or  seizure  -Post Op delirium risk factors:  No risk identified    ENT  - No current airway concerns.  Will need to be reassessed day of surgery.  Mallampati: Unable to assess  TM: Unable to assess  ~ Hearing loss - bilateral hearing aids    CARDIAC  - Hypertension  Well controlled - hold lisinopril DOS  - Hyperlipidemia  Working with her PCP. Not on medications currently. Will plan for future CT coronary calcium.   - METS (Metabolic Equivalents)  Patient performs 4 or more METS exercise without symptoms             Total Score: 0      RCRI-Very low risk: Class 1 0.4% complication rate             Total Score: 0    ~ The patient has a history at her first liver transplant of developing  either V. tach or wide-complex atrial fibrillation that was unstable.  During operative course she was shocked at least 5 times and her transplant was ultimately aborted. She underwent a left heart cath and an MRI of the heart which did not demonstrate any overt abnormalities that would have predisposed her to this condition.  She was treated with amiodarone during the hospital course but electrophysiology felt that she could safely discontinue. She followed up with cardiology and underwent successful transplant 3 months later. She hasn't had any issues since then. She walks 6 miles a day and denies any cardiac symptoms. No further testing indicated.     PULMONARY  - Obstructive Sleep Apnea  No current risk of obstructive sleep apnea   MIGUEL ANGEL Low Risk             Total Score: 2    MIGUEL ANGEL: Hypertension    MIGUEL ANGEL: Over 50 ys old      - Denies asthma or inhaler use  - Tobacco History    History   Smoking Status    Never   Smokeless Tobacco    Never       GI  - history of PSC s/p liver transplant in 2021. Seen by Dr. Leventhal on 12/11/23 and doing well. She had labs on 9/6/24 with LFTs within normal limits. She will continue all her transplant medications   ~ History of colon polyp - removed in 10/2023 colonoscopy.   ~ History of pancreatic  "mucinous cystadenoma  ~ microscopic colitis/ ulcerative pancolitis - followed by Dr. Crawford and last seen on 11/2/23. Planned for yearly colonoscopy for surveillance. At her last colonoscopy she was found to have inactive colitis. She denies any colitis flares currently.   ~ Incision hernia - procedure as above.   PONV Medium Risk  Total Score: 2           1 AN PONV: Pt is Female    1 AN PONV: Patient is not a current smoker        /RENAL  - Baseline Creatinine 0.84    ENDOCRINE    - BMI: Estimated body mass index is 29.01 kg/m  as calculated from the following:    Height as of this encounter: 1.645 m (5' 4.75\").    Weight as of this encounter: 78.5 kg (173 lb).  Overweight (BMI 25.0-29.9)  - No history of Diabetes Mellitus    HEME  VTE Low Risk 0.26%             Total Score: 1    VTE: Greater than 59 yrs old      - No history of abnormal bleeding or antiplatelet use.  ~ History of prior transfusion     MSK  Patient is NOT Frail             Total Score: 0      IMMUNOLOGY  - Sjogren's - followed by rheumatology and last seen on 4/10/23. She reports since her transplant she hasn't had issues with dry eyes or dry mouth.     Different anesthesia methods/types have been discussed with the patient, but they are aware that the final plan will be decided by the assigned anesthesia provider on the date of service.    The patient is optimized for their procedure. AVS with information on surgery time/arrival time, meds and NPO status given by nursing staff. No further diagnostic testing indicated.    Please refer to the physical examination documented by the anesthesiologist in the anesthesia record on the day of surgery.    Video-Visit Details    Type of service:  Video Visit    Provider received verbal consent for a Video Visit from the patient? Yes     Originating Location (pt. Location): Home    Distant Location (provider location):  Off-site  Mode of Communication:  Video Conference via [employer]  On the day of service: "     Prep time: 10 minutes  Visit time: 13 minutes  Documentation time: 9 minutes  ------------------------------------------  Total time: 32 minutes      Susana Barkley PA-C  Preoperative Assessment Center  [facility]  Phone: [phone]  Fax: [phone]

## 2024-10-24 ENCOUNTER — LAB (OUTPATIENT)
Dept: LAB | Facility: CLINIC | Age: 59
End: 2024-10-24
Payer: COMMERCIAL

## 2024-10-24 LAB
BASOPHILS # BLD AUTO: 0 10E3/UL (ref 0–0.2)
BASOPHILS NFR BLD AUTO: 1 %
EOSINOPHIL # BLD AUTO: 0.2 10E3/UL (ref 0–0.7)
EOSINOPHIL NFR BLD AUTO: 3 %
ERYTHROCYTE [DISTWIDTH] IN BLOOD BY AUTOMATED COUNT: 13.4 % (ref 10–15)
HCT VFR BLD AUTO: 37.2 % (ref 35–47)
HGB BLD-MCNC: 12.5 G/DL (ref 11.7–15.7)
IMM GRANULOCYTES # BLD: 0 10E3/UL
IMM GRANULOCYTES NFR BLD: 0 %
INR PPP: 1.04 (ref 0.85–1.15)
LYMPHOCYTES # BLD AUTO: 4.5 10E3/UL (ref 0.8–5.3)
LYMPHOCYTES NFR BLD AUTO: 55 %
MCH RBC QN AUTO: 28.7 PG (ref 26.5–33)
MCHC RBC AUTO-ENTMCNC: 33.6 G/DL (ref 31.5–36.5)
MCV RBC AUTO: 86 FL (ref 78–100)
MONOCYTES # BLD AUTO: 0.3 10E3/UL (ref 0–1.3)
MONOCYTES NFR BLD AUTO: 4 %
NEUTROPHILS # BLD AUTO: 3 10E3/UL (ref 1.6–8.3)
NEUTROPHILS NFR BLD AUTO: 37 %
NRBC # BLD AUTO: 0 10E3/UL
NRBC BLD AUTO-RTO: 0 /100
PLATELET # BLD AUTO: 191 10E3/UL (ref 150–450)
RBC # BLD AUTO: 4.35 10E6/UL (ref 3.8–5.2)
WBC # BLD AUTO: 8.1 10E3/UL (ref 4–11)

## 2024-10-24 PROCEDURE — 85610 PROTHROMBIN TIME: CPT | Performed by: INTERNAL MEDICINE

## 2024-10-24 PROCEDURE — 36415 COLL VENOUS BLD VENIPUNCTURE: CPT | Performed by: INTERNAL MEDICINE

## 2024-10-24 PROCEDURE — 85025 COMPLETE CBC W/AUTO DIFF WBC: CPT | Performed by: INTERNAL MEDICINE

## 2024-11-06 ENCOUNTER — ANESTHESIA (OUTPATIENT)
Dept: SURGERY | Facility: CLINIC | Age: 59
End: 2024-11-06
Payer: COMMERCIAL

## 2024-11-06 ENCOUNTER — HOSPITAL ENCOUNTER (OUTPATIENT)
Facility: CLINIC | Age: 59
Discharge: HOME | End: 2024-11-07
Attending: TRANSPLANT SURGERY | Admitting: TRANSPLANT SURGERY
Payer: COMMERCIAL

## 2024-11-06 DIAGNOSIS — Z87.19 S/P HERNIORRHAPHY: Primary | ICD-10-CM

## 2024-11-06 DIAGNOSIS — Z98.890 S/P HERNIORRHAPHY: Primary | ICD-10-CM

## 2024-11-06 LAB
ANION GAP SERPL CALCULATED.3IONS-SCNC: 13 MMOL/L (ref 7–15)
BUN SERPL-MCNC: 18.7 MG/DL (ref 8–23)
CALCIUM SERPL-MCNC: 9.4 MG/DL (ref 8.8–10.4)
CHLORIDE SERPL-SCNC: 106 MMOL/L (ref 98–107)
CREAT SERPL-MCNC: 0.83 MG/DL (ref 0.51–0.95)
EGFRCR SERPLBLD CKD-EPI 2021: 80 ML/MIN/1.73M2
ERYTHROCYTE [DISTWIDTH] IN BLOOD BY AUTOMATED COUNT: 13.7 % (ref 10–15)
GLUCOSE SERPL-MCNC: 79 MG/DL (ref 70–99)
HCO3 SERPL-SCNC: 18 MMOL/L (ref 22–29)
HCT VFR BLD AUTO: 39.1 % (ref 35–47)
HGB BLD-MCNC: 13.6 G/DL (ref 11.7–15.7)
MCH RBC QN AUTO: 29.4 PG (ref 26.5–33)
MCHC RBC AUTO-ENTMCNC: 34.8 G/DL (ref 31.5–36.5)
MCV RBC AUTO: 85 FL (ref 78–100)
PLATELET # BLD AUTO: 214 10E3/UL (ref 150–450)
POTASSIUM SERPL-SCNC: 4.3 MMOL/L (ref 3.4–5.3)
RBC # BLD AUTO: 4.62 10E6/UL (ref 3.8–5.2)
SODIUM SERPL-SCNC: 137 MMOL/L (ref 135–145)
WBC # BLD AUTO: 7.8 10E3/UL (ref 4–11)

## 2024-11-06 PROCEDURE — 250N000025 HC SEVOFLURANE, PER MIN: Performed by: TRANSPLANT SURGERY

## 2024-11-06 PROCEDURE — 250N000011 HC RX IP 250 OP 636

## 2024-11-06 PROCEDURE — 710N000011 HC RECOVERY PHASE 1, LEVEL 3, PER MIN: Performed by: TRANSPLANT SURGERY

## 2024-11-06 PROCEDURE — 64461 PVB THORACIC SINGLE INJ SITE: CPT | Mod: 59 | Performed by: ANESTHESIOLOGY

## 2024-11-06 PROCEDURE — 250N000012 HC RX MED GY IP 250 OP 636 PS 637: Performed by: TRANSPLANT SURGERY

## 2024-11-06 PROCEDURE — 250N000011 HC RX IP 250 OP 636: Mod: JZ | Performed by: ANESTHESIOLOGY

## 2024-11-06 PROCEDURE — 49595 RPR AA HRN 1ST > 10 RDC: CPT | Performed by: NURSE ANESTHETIST, CERTIFIED REGISTERED

## 2024-11-06 PROCEDURE — 250N000011 HC RX IP 250 OP 636: Performed by: NURSE ANESTHETIST, CERTIFIED REGISTERED

## 2024-11-06 PROCEDURE — 250N000013 HC RX MED GY IP 250 OP 250 PS 637

## 2024-11-06 PROCEDURE — 250N000011 HC RX IP 250 OP 636: Performed by: STUDENT IN AN ORGANIZED HEALTH CARE EDUCATION/TRAINING PROGRAM

## 2024-11-06 PROCEDURE — 370N000017 HC ANESTHESIA TECHNICAL FEE, PER MIN: Performed by: TRANSPLANT SURGERY

## 2024-11-06 PROCEDURE — 85014 HEMATOCRIT: CPT

## 2024-11-06 PROCEDURE — 64461 PVB THORACIC SINGLE INJ SITE: CPT | Mod: XU,LT

## 2024-11-06 PROCEDURE — 36415 COLL VENOUS BLD VENIPUNCTURE: CPT

## 2024-11-06 PROCEDURE — 80048 BASIC METABOLIC PNL TOTAL CA: CPT

## 2024-11-06 PROCEDURE — 49595 RPR AA HRN 1ST > 10 RDC: CPT | Mod: GC | Performed by: TRANSPLANT SURGERY

## 2024-11-06 PROCEDURE — 258N000003 HC RX IP 258 OP 636: Performed by: NURSE ANESTHETIST, CERTIFIED REGISTERED

## 2024-11-06 PROCEDURE — 999N000141 HC STATISTIC PRE-PROCEDURE NURSING ASSESSMENT: Performed by: TRANSPLANT SURGERY

## 2024-11-06 PROCEDURE — 250N000009 HC RX 250: Performed by: NURSE ANESTHETIST, CERTIFIED REGISTERED

## 2024-11-06 PROCEDURE — C9290 INJ, BUPIVACAINE LIPOSOME: HCPCS | Performed by: ANESTHESIOLOGY

## 2024-11-06 PROCEDURE — 272N000001 HC OR GENERAL SUPPLY STERILE: Performed by: TRANSPLANT SURGERY

## 2024-11-06 PROCEDURE — 360N000075 HC SURGERY LEVEL 2, PER MIN: Performed by: TRANSPLANT SURGERY

## 2024-11-06 RX ORDER — NALOXONE HYDROCHLORIDE 0.4 MG/ML
0.2 INJECTION, SOLUTION INTRAMUSCULAR; INTRAVENOUS; SUBCUTANEOUS
Status: DISCONTINUED | OUTPATIENT
Start: 2024-11-06 | End: 2024-11-07 | Stop reason: HOSPADM

## 2024-11-06 RX ORDER — HYDROMORPHONE HCL IN WATER/PF 6 MG/30 ML
0.2 PATIENT CONTROLLED ANALGESIA SYRINGE INTRAVENOUS EVERY 5 MIN PRN
Status: DISCONTINUED | OUTPATIENT
Start: 2024-11-06 | End: 2024-11-06 | Stop reason: HOSPADM

## 2024-11-06 RX ORDER — OXYCODONE HYDROCHLORIDE 5 MG/1
5 TABLET ORAL EVERY 4 HOURS PRN
Status: DISCONTINUED | OUTPATIENT
Start: 2024-11-06 | End: 2024-11-07 | Stop reason: HOSPADM

## 2024-11-06 RX ORDER — DEXAMETHASONE SODIUM PHOSPHATE 4 MG/ML
4 INJECTION, SOLUTION INTRA-ARTICULAR; INTRALESIONAL; INTRAMUSCULAR; INTRAVENOUS; SOFT TISSUE
Status: DISCONTINUED | OUTPATIENT
Start: 2024-11-06 | End: 2024-11-06 | Stop reason: HOSPADM

## 2024-11-06 RX ORDER — POLYETHYLENE GLYCOL 3350 17 G/17G
17 POWDER, FOR SOLUTION ORAL DAILY
Status: DISCONTINUED | OUTPATIENT
Start: 2024-11-07 | End: 2024-11-07 | Stop reason: HOSPADM

## 2024-11-06 RX ORDER — NALOXONE HYDROCHLORIDE 0.4 MG/ML
0.4 INJECTION, SOLUTION INTRAMUSCULAR; INTRAVENOUS; SUBCUTANEOUS
Status: DISCONTINUED | OUTPATIENT
Start: 2024-11-06 | End: 2024-11-06 | Stop reason: HOSPADM

## 2024-11-06 RX ORDER — ONDANSETRON 2 MG/ML
4 INJECTION INTRAMUSCULAR; INTRAVENOUS EVERY 30 MIN PRN
Status: DISCONTINUED | OUTPATIENT
Start: 2024-11-06 | End: 2024-11-06 | Stop reason: HOSPADM

## 2024-11-06 RX ORDER — NALOXONE HYDROCHLORIDE 0.4 MG/ML
0.2 INJECTION, SOLUTION INTRAMUSCULAR; INTRAVENOUS; SUBCUTANEOUS
Status: DISCONTINUED | OUTPATIENT
Start: 2024-11-06 | End: 2024-11-06 | Stop reason: HOSPADM

## 2024-11-06 RX ORDER — FLUMAZENIL 0.1 MG/ML
0.2 INJECTION, SOLUTION INTRAVENOUS
Status: DISCONTINUED | OUTPATIENT
Start: 2024-11-06 | End: 2024-11-06 | Stop reason: HOSPADM

## 2024-11-06 RX ORDER — PIPERACILLIN SODIUM, TAZOBACTAM SODIUM 3; .375 G/15ML; G/15ML
INJECTION, POWDER, LYOPHILIZED, FOR SOLUTION INTRAVENOUS PRN
Status: DISCONTINUED | OUTPATIENT
Start: 2024-11-06 | End: 2024-11-06

## 2024-11-06 RX ORDER — TACROLIMUS 0.5 MG/1
0.5 CAPSULE ORAL EVERY 12 HOURS
Status: DISCONTINUED | OUTPATIENT
Start: 2024-11-06 | End: 2024-11-06

## 2024-11-06 RX ORDER — AMOXICILLIN 250 MG
1 CAPSULE ORAL 2 TIMES DAILY
Status: DISCONTINUED | OUTPATIENT
Start: 2024-11-06 | End: 2024-11-07 | Stop reason: HOSPADM

## 2024-11-06 RX ORDER — HYDROMORPHONE HCL IN WATER/PF 6 MG/30 ML
.2-.4 PATIENT CONTROLLED ANALGESIA SYRINGE INTRAVENOUS
Status: DISCONTINUED | OUTPATIENT
Start: 2024-11-06 | End: 2024-11-07 | Stop reason: HOSPADM

## 2024-11-06 RX ORDER — NALOXONE HYDROCHLORIDE 0.4 MG/ML
0.4 INJECTION, SOLUTION INTRAMUSCULAR; INTRAVENOUS; SUBCUTANEOUS
Status: DISCONTINUED | OUTPATIENT
Start: 2024-11-06 | End: 2024-11-07 | Stop reason: HOSPADM

## 2024-11-06 RX ORDER — SODIUM CHLORIDE, SODIUM LACTATE, POTASSIUM CHLORIDE, CALCIUM CHLORIDE 600; 310; 30; 20 MG/100ML; MG/100ML; MG/100ML; MG/100ML
INJECTION, SOLUTION INTRAVENOUS CONTINUOUS PRN
Status: DISCONTINUED | OUTPATIENT
Start: 2024-11-06 | End: 2024-11-06

## 2024-11-06 RX ORDER — FENTANYL CITRATE 50 UG/ML
50 INJECTION, SOLUTION INTRAMUSCULAR; INTRAVENOUS EVERY 5 MIN PRN
Status: DISCONTINUED | OUTPATIENT
Start: 2024-11-06 | End: 2024-11-06 | Stop reason: HOSPADM

## 2024-11-06 RX ORDER — FENTANYL CITRATE 50 UG/ML
25-50 INJECTION, SOLUTION INTRAMUSCULAR; INTRAVENOUS
Status: DISCONTINUED | OUTPATIENT
Start: 2024-11-06 | End: 2024-11-06 | Stop reason: HOSPADM

## 2024-11-06 RX ORDER — LIDOCAINE HYDROCHLORIDE 20 MG/ML
INJECTION, SOLUTION INFILTRATION; PERINEURAL PRN
Status: DISCONTINUED | OUTPATIENT
Start: 2024-11-06 | End: 2024-11-06

## 2024-11-06 RX ORDER — DEXAMETHASONE SODIUM PHOSPHATE 4 MG/ML
INJECTION, SOLUTION INTRA-ARTICULAR; INTRALESIONAL; INTRAMUSCULAR; INTRAVENOUS; SOFT TISSUE PRN
Status: DISCONTINUED | OUTPATIENT
Start: 2024-11-06 | End: 2024-11-06

## 2024-11-06 RX ORDER — FENTANYL CITRATE 50 UG/ML
INJECTION, SOLUTION INTRAMUSCULAR; INTRAVENOUS PRN
Status: DISCONTINUED | OUTPATIENT
Start: 2024-11-06 | End: 2024-11-06

## 2024-11-06 RX ORDER — NALOXONE HYDROCHLORIDE 0.4 MG/ML
0.1 INJECTION, SOLUTION INTRAMUSCULAR; INTRAVENOUS; SUBCUTANEOUS
Status: DISCONTINUED | OUTPATIENT
Start: 2024-11-06 | End: 2024-11-06 | Stop reason: HOSPADM

## 2024-11-06 RX ORDER — LEVOFLOXACIN 750 MG/1
750 TABLET, FILM COATED ORAL DAILY
Status: DISCONTINUED | OUTPATIENT
Start: 2024-11-07 | End: 2024-11-07 | Stop reason: HOSPADM

## 2024-11-06 RX ORDER — VANCOMYCIN HYDROCHLORIDE 1 G/200ML
1000 INJECTION, SOLUTION INTRAVENOUS
Status: COMPLETED | OUTPATIENT
Start: 2024-11-06 | End: 2024-11-06

## 2024-11-06 RX ORDER — BISACODYL 10 MG
10 SUPPOSITORY, RECTAL RECTAL DAILY PRN
Status: DISCONTINUED | OUTPATIENT
Start: 2024-11-09 | End: 2024-11-07 | Stop reason: HOSPADM

## 2024-11-06 RX ORDER — LIDOCAINE 40 MG/G
CREAM TOPICAL
Status: DISCONTINUED | OUTPATIENT
Start: 2024-11-06 | End: 2024-11-07 | Stop reason: HOSPADM

## 2024-11-06 RX ORDER — ACETAMINOPHEN 325 MG/1
650 TABLET ORAL EVERY 6 HOURS
Status: DISCONTINUED | OUTPATIENT
Start: 2024-11-06 | End: 2024-11-07 | Stop reason: HOSPADM

## 2024-11-06 RX ORDER — HYDROMORPHONE HCL IN WATER/PF 6 MG/30 ML
0.4 PATIENT CONTROLLED ANALGESIA SYRINGE INTRAVENOUS EVERY 5 MIN PRN
Status: DISCONTINUED | OUTPATIENT
Start: 2024-11-06 | End: 2024-11-06 | Stop reason: HOSPADM

## 2024-11-06 RX ORDER — OXYCODONE HYDROCHLORIDE 10 MG/1
10 TABLET ORAL EVERY 4 HOURS PRN
Status: DISCONTINUED | OUTPATIENT
Start: 2024-11-06 | End: 2024-11-07 | Stop reason: HOSPADM

## 2024-11-06 RX ORDER — ONDANSETRON 2 MG/ML
4 INJECTION INTRAMUSCULAR; INTRAVENOUS EVERY 6 HOURS PRN
Status: DISCONTINUED | OUTPATIENT
Start: 2024-11-06 | End: 2024-11-07 | Stop reason: HOSPADM

## 2024-11-06 RX ORDER — BUPIVACAINE HYDROCHLORIDE 2.5 MG/ML
INJECTION, SOLUTION EPIDURAL; INFILTRATION; INTRACAUDAL
Status: COMPLETED | OUTPATIENT
Start: 2024-11-06 | End: 2024-11-06

## 2024-11-06 RX ORDER — PROCHLORPERAZINE MALEATE 5 MG/1
10 TABLET ORAL EVERY 6 HOURS PRN
Status: DISCONTINUED | OUTPATIENT
Start: 2024-11-06 | End: 2024-11-07 | Stop reason: HOSPADM

## 2024-11-06 RX ORDER — PIPERACILLIN SODIUM, TAZOBACTAM SODIUM 3; .375 G/15ML; G/15ML
3.38 INJECTION, POWDER, LYOPHILIZED, FOR SOLUTION INTRAVENOUS ONCE
Status: COMPLETED | OUTPATIENT
Start: 2024-11-06 | End: 2024-11-06

## 2024-11-06 RX ORDER — LISINOPRIL 10 MG/1
10 TABLET ORAL EVERY MORNING
Status: DISCONTINUED | OUTPATIENT
Start: 2024-11-07 | End: 2024-11-07 | Stop reason: HOSPADM

## 2024-11-06 RX ORDER — METHOCARBAMOL 750 MG/1
750 TABLET, FILM COATED ORAL EVERY 6 HOURS PRN
Status: DISCONTINUED | OUTPATIENT
Start: 2024-11-06 | End: 2024-11-07

## 2024-11-06 RX ORDER — ONDANSETRON 4 MG/1
4 TABLET, ORALLY DISINTEGRATING ORAL EVERY 6 HOURS PRN
Status: DISCONTINUED | OUTPATIENT
Start: 2024-11-06 | End: 2024-11-07 | Stop reason: HOSPADM

## 2024-11-06 RX ORDER — FENTANYL CITRATE 50 UG/ML
25 INJECTION, SOLUTION INTRAMUSCULAR; INTRAVENOUS EVERY 5 MIN PRN
Status: DISCONTINUED | OUTPATIENT
Start: 2024-11-06 | End: 2024-11-06 | Stop reason: HOSPADM

## 2024-11-06 RX ORDER — ONDANSETRON 2 MG/ML
INJECTION INTRAMUSCULAR; INTRAVENOUS PRN
Status: DISCONTINUED | OUTPATIENT
Start: 2024-11-06 | End: 2024-11-06

## 2024-11-06 RX ORDER — TACROLIMUS 1 MG/1
1 CAPSULE ORAL EVERY 12 HOURS
Status: DISCONTINUED | OUTPATIENT
Start: 2024-11-06 | End: 2024-11-06

## 2024-11-06 RX ORDER — SIMETHICONE 80 MG
80 TABLET,CHEWABLE ORAL EVERY 6 HOURS PRN
Status: DISCONTINUED | OUTPATIENT
Start: 2024-11-06 | End: 2024-11-07 | Stop reason: HOSPADM

## 2024-11-06 RX ORDER — PROPOFOL 10 MG/ML
INJECTION, EMULSION INTRAVENOUS PRN
Status: DISCONTINUED | OUTPATIENT
Start: 2024-11-06 | End: 2024-11-06

## 2024-11-06 RX ORDER — ONDANSETRON 4 MG/1
4 TABLET, ORALLY DISINTEGRATING ORAL EVERY 30 MIN PRN
Status: DISCONTINUED | OUTPATIENT
Start: 2024-11-06 | End: 2024-11-06 | Stop reason: HOSPADM

## 2024-11-06 RX ADMIN — PIPERACILLIN AND TAZOBACTAM 3.38 G: 3; .375 INJECTION, POWDER, FOR SOLUTION INTRAVENOUS at 13:15

## 2024-11-06 RX ADMIN — TACROLIMUS 1.5 MG: 1 CAPSULE ORAL at 18:02

## 2024-11-06 RX ADMIN — PROPOFOL 15 MG: 10 INJECTION, EMULSION INTRAVENOUS at 14:49

## 2024-11-06 RX ADMIN — SODIUM CHLORIDE, POTASSIUM CHLORIDE, SODIUM LACTATE AND CALCIUM CHLORIDE: 600; 310; 30; 20 INJECTION, SOLUTION INTRAVENOUS at 11:28

## 2024-11-06 RX ADMIN — LIDOCAINE HYDROCHLORIDE 100 MG: 20 INJECTION, SOLUTION INFILTRATION; PERINEURAL at 11:35

## 2024-11-06 RX ADMIN — Medication 10 MG: at 12:19

## 2024-11-06 RX ADMIN — ONDANSETRON 4 MG: 2 INJECTION INTRAMUSCULAR; INTRAVENOUS at 13:55

## 2024-11-06 RX ADMIN — HYDROMORPHONE HYDROCHLORIDE 0.5 MG: 1 INJECTION, SOLUTION INTRAMUSCULAR; INTRAVENOUS; SUBCUTANEOUS at 12:19

## 2024-11-06 RX ADMIN — HYDROMORPHONE HYDROCHLORIDE 0.2 MG: 0.2 INJECTION, SOLUTION INTRAMUSCULAR; INTRAVENOUS; SUBCUTANEOUS at 16:42

## 2024-11-06 RX ADMIN — BUPIVACAINE HYDROCHLORIDE 10 ML: 2.5 INJECTION, SOLUTION EPIDURAL; INFILTRATION; INTRACAUDAL; PERINEURAL at 11:00

## 2024-11-06 RX ADMIN — PROCHLORPERAZINE EDISYLATE 10 MG: 5 INJECTION INTRAMUSCULAR; INTRAVENOUS at 20:29

## 2024-11-06 RX ADMIN — ONDANSETRON 4 MG: 2 INJECTION INTRAMUSCULAR; INTRAVENOUS at 15:14

## 2024-11-06 RX ADMIN — PHENYLEPHRINE HYDROCHLORIDE 100 MCG: 10 INJECTION INTRAVENOUS at 12:52

## 2024-11-06 RX ADMIN — FENTANYL CITRATE 100 MCG: 50 INJECTION INTRAMUSCULAR; INTRAVENOUS at 11:35

## 2024-11-06 RX ADMIN — FENTANYL CITRATE 25 MCG: 50 INJECTION, SOLUTION INTRAMUSCULAR; INTRAVENOUS at 15:46

## 2024-11-06 RX ADMIN — ONDANSETRON 4 MG: 2 INJECTION INTRAMUSCULAR; INTRAVENOUS at 18:01

## 2024-11-06 RX ADMIN — METHOCARBAMOL 750 MG: 750 TABLET ORAL at 20:39

## 2024-11-06 RX ADMIN — BUPIVACAINE 20 ML: 13.3 INJECTION, SUSPENSION, LIPOSOMAL INFILTRATION at 11:00

## 2024-11-06 RX ADMIN — SODIUM CHLORIDE, POTASSIUM CHLORIDE, SODIUM LACTATE AND CALCIUM CHLORIDE: 600; 310; 30; 20 INJECTION, SOLUTION INTRAVENOUS at 14:20

## 2024-11-06 RX ADMIN — PIPERACILLIN AND TAZOBACTAM 3.38 G: 3; .375 INJECTION, POWDER, LYOPHILIZED, FOR SOLUTION INTRAVENOUS at 11:15

## 2024-11-06 RX ADMIN — ACETAMINOPHEN 650 MG: 325 TABLET, FILM COATED ORAL at 21:30

## 2024-11-06 RX ADMIN — MIDAZOLAM 1 MG: 1 INJECTION INTRAMUSCULAR; INTRAVENOUS at 10:58

## 2024-11-06 RX ADMIN — Medication 100 MG: at 14:01

## 2024-11-06 RX ADMIN — DEXAMETHASONE SODIUM PHOSPHATE 4 MG: 4 INJECTION, SOLUTION INTRA-ARTICULAR; INTRALESIONAL; INTRAMUSCULAR; INTRAVENOUS; SOFT TISSUE at 12:02

## 2024-11-06 RX ADMIN — OXYCODONE HYDROCHLORIDE 10 MG: 10 TABLET ORAL at 18:01

## 2024-11-06 RX ADMIN — PROPOFOL 150 MG: 10 INJECTION, EMULSION INTRAVENOUS at 11:35

## 2024-11-06 RX ADMIN — FENTANYL CITRATE 25 MCG: 50 INJECTION, SOLUTION INTRAMUSCULAR; INTRAVENOUS at 16:11

## 2024-11-06 RX ADMIN — HYDROMORPHONE HYDROCHLORIDE 0.2 MG: 0.2 INJECTION, SOLUTION INTRAMUSCULAR; INTRAVENOUS; SUBCUTANEOUS at 16:53

## 2024-11-06 RX ADMIN — Medication 10 MG: at 13:01

## 2024-11-06 RX ADMIN — DEXMEDETOMIDINE HYDROCHLORIDE 12 MCG: 100 INJECTION, SOLUTION INTRAVENOUS at 15:15

## 2024-11-06 RX ADMIN — FENTANYL CITRATE 50 MCG: 50 INJECTION, SOLUTION INTRAMUSCULAR; INTRAVENOUS at 16:23

## 2024-11-06 RX ADMIN — SIMETHICONE 80 MG: 80 TABLET, CHEWABLE ORAL at 21:30

## 2024-11-06 RX ADMIN — HYDROMORPHONE HYDROCHLORIDE 0.5 MG: 1 INJECTION, SOLUTION INTRAMUSCULAR; INTRAVENOUS; SUBCUTANEOUS at 14:03

## 2024-11-06 RX ADMIN — VANCOMYCIN HYDROCHLORIDE 1000 MG: 1 INJECTION, SOLUTION INTRAVENOUS at 11:22

## 2024-11-06 RX ADMIN — Medication 50 MG: at 11:35

## 2024-11-06 RX ADMIN — FENTANYL CITRATE 50 MCG: 50 INJECTION, SOLUTION INTRAMUSCULAR; INTRAVENOUS at 10:57

## 2024-11-06 RX ADMIN — PROCHLORPERAZINE EDISYLATE 5 MG: 5 INJECTION INTRAMUSCULAR; INTRAVENOUS at 15:59

## 2024-11-06 ASSESSMENT — ACTIVITIES OF DAILY LIVING (ADL)
ADLS_ACUITY_SCORE: 0

## 2024-11-06 ASSESSMENT — LIFESTYLE VARIABLES: TOBACCO_USE: 0

## 2024-11-06 ASSESSMENT — ENCOUNTER SYMPTOMS: SEIZURES: 0

## 2024-11-06 NOTE — ANESTHESIA POSTPROCEDURE EVALUATION
Patient: Berta Nava    Procedure: Procedure(s):  open incisional hernia repair WITH MESH       Anesthesia Type:  General    Note:  Disposition: Admission   Postop Pain Control: Uneventful            Sign Out: Well controlled pain   PONV: No   Neuro/Psych: Uneventful            Sign Out: Acceptable/Baseline neuro status   Airway/Respiratory: Uneventful            Sign Out: Acceptable/Baseline resp. status   CV/Hemodynamics: Uneventful            Sign Out: Acceptable CV status; No obvious hypovolemia; No obvious fluid overload   Other NRE: NONE   DID A NON-ROUTINE EVENT OCCUR? No           Last vitals:  Vitals Value Taken Time   /76 11/06/24 1700   Temp 36.6  C (97.8  F) 11/06/24 1700   Pulse 65 11/06/24 1706   Resp 52 11/06/24 1706   SpO2 100 % 11/06/24 1706   Vitals shown include unfiled device data.    Electronically Signed By: Kevan Johnson MD  November 6, 2024  5:07 PM

## 2024-11-06 NOTE — ANESTHESIA PROCEDURE NOTES
Airway       Patient location during procedure: OR       Procedure Start/Stop Times: 11/6/2024 11:37 AM  Staff -        CRNA: Zafar Peoples APRN CRNA       Performed By: CRNAIndications and Patient Condition       Indications for airway management: gerardo-procedural       Induction type:intravenous       Mask difficulty assessment: 1 - vent by mask    Final Airway Details       Final airway type: endotracheal airway       Successful airway: ETT - single  Endotracheal Airway Details        ETT size (mm): 7.0       Cuffed: yes       Successful intubation technique: direct laryngoscopy       DL Blade Type: MAC 3       Grade View of Cords: 1       Adjucts: stylet       Position: Right       Measured from: lips       Secured at (cm): 22       Bite block used: None    Post intubation assessment        Placement verified by: capnometry        Number of attempts at approach: 1       Number of other approaches attempted: 0       Secured with: tape       Ease of procedure: easy       Dentition: Intact    Medication(s) Administered   Medication Administration Time: 11/6/2024 11:37 AM

## 2024-11-06 NOTE — ANESTHESIA CARE TRANSFER NOTE
Patient: Berta Nava    Procedure: Procedure(s):  open incisional hernia repair WITH MESH       Diagnosis: Hernia, incisional [K43.2]  Diagnosis Additional Information: No value filed.    Anesthesia Type:   General     Note:      Level of Consciousness: drowsy  Oxygen Supplementation: room air        Vital Signs Stable: post-procedure vital signs reviewed and stable    Patient transferred to: PACU    Handoff Report: Identifed the Patient, Identified the Reponsible Provider, Reviewed the pertinent medical history, Discussed the surgical course, Reviewed Intra-OP anesthesia mangement and issues during anesthesia, Set expectations for post-procedure period and Allowed opportunity for questions and acknowledgement of understanding      Vitals:  Vitals Value Taken Time   /72 11/06/24 1525   Temp     Pulse 63 11/06/24 1526   Resp 42 11/06/24 1526   SpO2 94 % 11/06/24 1526   Vitals shown include unfiled device data.    Electronically Signed By: CHELSEY Awan CRNA  November 6, 2024  3:26 PM

## 2024-11-06 NOTE — PLAN OF CARE
"BP (!) 154/82   Pulse 65   Temp 97.8  F (36.6  C) (Oral)   Resp 16   Ht 1.676 m (5' 6\")   Wt 78 kg (171 lb 15.3 oz)   LMP  (LMP Unknown)   SpO2 100%   BMI 27.75 kg/m      Status: S/p abdominal hernia repair with mesh  Activity: SBA to bathroom  Neuros: A&Ox4, no deficits noted.  Cardiac: WDL, denies chest pain.  Respiratory: WDL on RA, denies SOB.  GI/: Hypoactive bowel sounds, LBM 11/5, before surgery. Not yet passing gas. Small urine output voiding spontaneously; . Team aware, continue to encourage voiding.  Diet: Tolerating sips of clears.  Skin/Incisions: Abd incisions derma-bonded, BART/CDI.  Lines/Drains: L PIV SL.  Pain/Nausea: Pain managed with PRN oxycodone x1, nausea managed with PRN zofran x1.  New Changes: Pt arrived from PACU at 1545. Vitally stable on room air, incision BART/CDI. Pain and nausea adequately managed on current regimen.  "

## 2024-11-06 NOTE — ANESTHESIA PREPROCEDURE EVALUATION
Anesthesia Pre-Procedure Evaluation    Patient: Berta Nava   MRN: [id number] : 1965        Procedure : Procedure(s):  open incisional hernia repair          Past Medical History:   Diagnosis Date    Ascites     Biliary cirrhosis (H)     Cholangitis, sclerosing (H)     Cirrhosis of liver with ascites (H) 2021    Hearing loss of left ear     wears a hearing aide    History of blood transfusion     History of low potassium     Hyperlipidemia     Hypertension     Liver transplant rejection (H) 2022    Mild Rejection    SBP (spontaneous bacterial peritonitis) (H) 2021    Sjogren's syndrome (H)     Ulcerative colitis (H)     Ulcerative pancolitis (H)       Past Surgical History:   Procedure Laterality Date    BENCH LIVER N/A 2021    Procedure: Bench liver;  Surgeon: Ernesto Schmitt MD;  Location:  OR    CATARACT IOL, RT/LT      COLONOSCOPY N/A 2022    Procedure: COLONOSCOPY, WITH BIOPSY;  Surgeon: Leventhal, Thomas Michael, MD;  [place] [facility] [place]    COLONOSCOPY N/A 10/31/2022    Procedure: COLONOSCOPY, WITH POLYPECTOMY AND BIOPSY;  Surgeon: Kendrick Crawford MD;  [place] [facility] [place]    COLONOSCOPY N/A 10/31/2023    Procedure: Colonoscopy with biopsy and polypectomy;  Surgeon: Kendrick Crawford MD;  [place] [facility] [place]    COMBINED ESOPHAGOSCOPY, GASTROSCOPY, DUODENOSCOPY (EGD), REMOVE BILIARY STENT N/A 2022    Procedure: ESOPHAGOGASTRODUODENOSCOPY, WITH BILIARY STENT REMOVAL;  Surgeon: Leventhal, Thomas Michael, MD;  [place] [facility] [place]    CV CORONARY ANGIOGRAM N/A 2021    Procedure: Coronary Angiogram with possible intervention;  Surgeon: David Wilhelm MD;  Location:  HEART CARDI[facility] CATH LAB    ENDOSCOPIC RETROGRADE CHOLANGIOPANCREATOGRAM N/A 2021    Procedure: ENDOSCOPIC RETROGRADE CHOLANGIOPANCREATOGRAPHY with ballon sweep of bile ducts for stones, balloon dilation of bile ducts and bile duct stent placement;  Surgeon: Harvey  MD Gregory;  Location: [facility] OR    ENDOSCOPIC RETROGRADE CHOLANGIOPANCREATOGRAM N/A 07/22/2021    Procedure: ENDOSCOPIC RETROGRADE CHOLANGIOPANCREATOGRAPHY STONE REMOVAL, DILATION AND STENT PLACEMENT;  Surgeon: Gregory Gabriel MD;  Location: [facility] OR    ENDOSCOPIC RETROGRADE CHOLANGIOPANCREATOGRAPHY, EXCHANGE TUBE/STENT N/A 05/05/2021    Procedure: ENDOSCOPIC RETROGRADE CHOLANGIOPANCREATOGRAPHY WITH STENT EXCHANGE, STONE EXTRACTION, AND DILATION;  Surgeon: Gregory Gabriel MD;  Location: [facility] OR    ENDOSCOPIC RETROGRADE CHOLANGIOPANCREATOGRAPHY, EXCHANGE TUBE/STENT N/A 05/10/2021    Procedure: ENDOSCOPIC RETROGRADE CHOLANGIOPANCREATOGRAPHY with biliary dilation, stone removal, stent exchange;  Surgeon: Gregory Gabriel MD;  Location: [facility] OR    ENDOSCOPIC RETROGRADE CHOLANGIOPANCREATOGRAPHY, EXCHANGE TUBE/STENT N/A 06/10/2021    Procedure: ENDOSCOPIC RETROGRADE CHOLANGIOPANCREATOGRAPHY, WITH biliary stent exchange, stone removal;  Surgeon: Woodrow Lott MD;  Location: [facility] OR    ENDOSCOPIC RETROGRADE CHOLANGIOPANCREATOGRAPHY, EXCHANGE TUBE/STENT N/A 08/30/2021    Procedure: ENDOSCOPIC RETROGRADE CHOLANGIOPANCREATOGRAPHY with biliary dilation, stone removal, stent exchange;  Surgeon: Gregory Gabriel MD;  Location: [facility] OR    ENDOSCOPIC RETROGRADE CHOLANGIOPANCREATOGRAPHY, EXCHANGE TUBE/STENT N/A 10/01/2021    Procedure: ENDOSCOPIC RETROGRADE CHOLANGIOPANCREATOGRAPHY with balloon sweep of bile ducts, bile duct stent exchanged;  Surgeon: Gregory Gabriel MD;  Location: [facility] OR    ESOPHAGOSCOPY, GASTROSCOPY, DUODENOSCOPY (EGD), COMBINED N/A 09/04/2021    Procedure: ESOPHAGOGASTRODUODENOSCOPY (EGD);  Surgeon: Leventhal, Thomas Michael, MD;  Location: [facility] GI    ESOPHAGOSCOPY, GASTROSCOPY, DUODENOSCOPY (EGD), COMBINED N/A 10/01/2021    Procedure: ESOPHAGOGASTRODUODENOSCOPY (EGD) with varices banding;  Surgeon: Gregory Gabriel MD;  Location: [facility] OR    GYN SURGERY      bilat fallopian tubes and ovaries removed    IR ERCP  12/04/2020    IR  ERCP  2021    IR LIVER BIOPSY PERCUTANEOUS  2022    PHACOEMULSIFICATION CLEAR CORNEA WITH TORIC INTRAOCULAR LENS IMPLANT Right 2024    Procedure: RIGHT EYE PHACOEMULSIFICATION, CATARACT, WITH INTRAOCULAR LENS IMPLANT, TORIC LENS;  Surgeon: Dory Palmer MD;  Location: [facility] OR    PICC DOUBLE LUMEN PLACEMENT Right 2021    42cm (2cm external), Lateral brachial vein    RETURN LIVER TRANSPLANT N/A 2021    Procedure: CLOSURE, WOUND, ABDOMEN, SECONDARY, ABDOMINAL WASHOUT;  Surgeon: Ernesto Schmitt MD;  Location: [facility] OR    TRANSPLANT  2021    Liver due to PSC    TRANSPLANT LIVER RECIPIENT  DONOR N/A 2021    Procedure: Opening of abdomen, Abdominal Exploration and aborted liver transplant.;  Surgeon: Ernesto Schmitt MD;  Location: [facility]    TRANSPLANT LIVER RECIPIENT  DONOR N/A 2021    Procedure: TRANSPLANT, LIVER, RECIPIENT,  DONOR;  Surgeon: Ernesto Schmitt MD;  Location: [facility] OR      Allergies   Allergen Reactions    Iodinated Contrast Media Hives     PATIENT HAD HIVE REACTION AFTER ADMINISTERING CT CONTRAST DYE.      Contrast Dye       Social History     Tobacco Use    Smoking status: Never     Passive exposure: Never    Smokeless tobacco: Never   Substance Use Topics    Alcohol use: No     Comment: Last drink was       Wt Readings from Last 1 Encounters:   24 78 kg (171 lb 15.3 oz)        Anesthesia Evaluation   Pt has had prior anesthetic. Type: General and MAC.        ROS/MED HX  ENT/Pulmonary: Comment: Hearing loss   (-) tobacco use   Neurologic:  - neg neurologic ROS  (-) no seizures, no CVA and no TIA   Cardiovascular:     (+) Dyslipidemia hypertension- -   -  - -                                 Previous cardiac testing   Echo: Date: Results:    Stress Test:  Date:  Results:  Interpretation Summary  Dobutamine stress echocardiogram with no inducible ischemia.     Target heart rate achieved. Hypotensive response  to dobutamine  No angina symptoms during stress test.  No ECG evidence of ischemia at rest or with dobutamine.  Normal segmental and global left ventricular systolic function at baseline,  LVEF 55-60%.  With peak dobutamine, LVEF increased further to >70% and LV cavity size  decreased appropriately.  No stress induced regional wall motion abnormalities.     Normal aortic root and no significant valvular dysfunction noted on screening  2D and Doppler examination.  ECG Reviewed:  Date: 2021 Results:  Sinus rhythm with sinus arrhythmia   Rightward axis   Incomplete right bundle branch block   Borderline ECG     Cath:  Date: 2021 Results:      METS/Exercise Tolerance: >4 METS    Hematologic:     (+)       history of blood transfusion, no previous transfusion reaction,     (-) history of blood clots and anemia   Musculoskeletal:  - neg musculoskeletal ROS     GI/Hepatic: Comment: Incisional hernia     History of colon polyp  Pancreatic mucinous cystadenoma  Microscopic colitis  Ulcerative pan colitis     (+)             liver disease (history of primary sclerosing cholangitis s/p liver transplant 2021),    (-) GERD   Renal/Genitourinary:    (-) renal disease   Endo:  - neg endo ROS     Psychiatric/Substance Use:  - neg psychiatric ROS     Infectious Disease:     (+)    VRE,        Malignancy:   (+) Malignancy, History of Skin.    Other: Comment: Sjogren's disease           Physical Exam    Airway        Mallampati: I   TM distance: > 3 FB   Neck ROM: full   Mouth opening: > 3 cm    Respiratory Devices and Support         Dental       (+) Modest Abnormalities - crowns, retainers, 1 or 2 missing teeth      Cardiovascular   cardiovascular exam normal          Pulmonary   pulmonary exam normal                OUTSIDE LABS:  CBC:   Lab Results   Component Value Date    WBC 8.1 10/24/2024    WBC 7.9 09/06/2024    HGB 12.5 10/24/2024    HGB 13.0 09/06/2024    HCT 37.2 10/24/2024    HCT 36.8 09/06/2024     10/24/2024      09/06/2024     BMP:   Lab Results   Component Value Date     09/06/2024     05/29/2024    POTASSIUM 4.6 09/06/2024    POTASSIUM 4.3 05/29/2024    CHLORIDE 104 09/06/2024    CHLORIDE 105 05/29/2024    CO2 23 09/06/2024    CO2 24 05/29/2024    BUN 17.4 09/06/2024    BUN 18.3 05/29/2024    CR 0.84 09/06/2024    CR 0.80 05/29/2024    GLC 94 09/06/2024    GLC 87 05/29/2024     COAGS:   Lab Results   Component Value Date    PTT 36 12/03/2021    INR 1.04 10/24/2024    FIBR 465 11/22/2021     POC:   Lab Results   Component Value Date    BGM 95 06/25/2021     HEPATIC:   Lab Results   Component Value Date    ALBUMIN 4.2 09/06/2024    PROTTOTAL 7.1 09/06/2024    ALT 13 09/06/2024    AST 22 09/06/2024    ALKPHOS 48 09/06/2024    BILITOTAL 0.4 09/06/2024    MIHIR 26 11/19/2021     OTHER:   Lab Results   Component Value Date    PH 7.48 (H) 11/20/2021    LACT 0.6 (L) 12/03/2021    A1C 4.6 03/22/2023    MARIELENA 9.2 09/06/2024    PHOS 3.6 09/06/2024    MAG 1.8 09/06/2024    LIPASE 155 12/03/2021    AMYLASE 36 11/19/2021    CRP 54.0 (H) 11/17/2021    SED 20 04/10/2023       Anesthesia Plan    ASA Status:  3    NPO Status:  NPO Appropriate    Anesthesia Type: General.     - Airway: ETT   Induction: Intravenous.   Maintenance: Inhalation.        Consents    Anesthesia Plan(s) and associated risks, benefits, and realistic alternatives discussed. Questions answered and patient/representative(s) expressed understanding.     - Discussed: Risks, Benefits and Alternatives for BOTH SEDATION and the PROCEDURE were discussed     - Discussed with:             Postoperative Care    Pain management: IV analgesics, Oral pain medications.   PONV prophylaxis: Ondansetron (or other 5HT-3), Dexamethasone or Solumedrol     Comments:    Other Comments: Day of Surgery evaluation 11/06/24: Patient identify confirmed, history reviewed and no recent changes. NPO appropriate. Anesthesia plan discussed and all questions answered.                "Morteza Bahena MD    I have reviewed the pertinent notes and labs in the chart from the past 30 days and (re)examined the patient.  Any updates or changes from those notes are reflected in this note.               # Hypertension: Noted on problem list           # Overweight: Estimated body mass index is 27.75 kg/m  as calculated from the following:    Height as of this encounter: 1.676 m (5' 6\").    Weight as of this encounter: 78 kg (171 lb 15.3 oz).             "

## 2024-11-06 NOTE — OP NOTE
[facility]    Operative Note    Pre-operative diagnosis: Hernia, incisional [K43.2]   Post-operative diagnosis Incisional hernia   Procedure: Procedure(s):  open incisional hernia repair WITH MESH   Surgeon: Surgeons and Role:     * Ernesto Schmitt MD - Primary     * Ozzie George MD - Resident - Assisting     * Henry León MD - Fellow - Assisting   Anesthesia: General with Block    Estimated blood loss: None   Drains: Hemovac   Specimens: * No specimens in log *   Findings: There was a [id number] incisional hernia on the left side of the chevron incision.   Complications: None.   Implants: [id number] strattice mesh           INDICATIONS FOR PROCEDURE  Berta Nava is a 59 year old female who has previously undergone liver transplant surgery.  she developed an incisional hernia abdominal pain in conjunction with discomfort due to the bulge.     After understanding the risks and benefits of proceeding with a open incisional hernia repair with mesh, she agreed to an operation.    General risks related to abdominal surgery were reviewed with the patient.    These include, but are not limited to, death, myocardial infarction, pneumonia, urinary tract infection, deep venous thrombosis with or without pulmonary embolus, abdominal infection from bowel injury or abscess, bowel obstruction, wound infection, and bleeding.      OPERATIVE PROCEDURE:      After smooth induction of general anesthesia, we prepped and draped the abdomen, with ioban, in the usual fashion.     We then began by making an incision along the prior chevron on the left side over the hernia. We then dissected down carefully to the hernia and isolated the sac. We opened the sac and entered it without injuring the bowel. We removed the hernia sac. We then placed the [id number] straattice meshin the abdomen and secured it to the abdominal wall with 10 tranfascial sutures. We placed 4 additional sutures  to secure it without gaps in the mesh.     We then irrigated the incision, reapproximated the fascia with another number 1 prolene and then closed in 3 layers, two vicryl, one monocryl and closed all the stab incisions with dermabond.       Dr. Schmitt was present for all critical components of the operation.    All sponge and needle counts were correct x 2 at the end of the procedure.    Blaze León MD, PhD  Transplant Surgery Fellow    Physician Attestation   I was present for the key portions of the procedure and I was immediately available for the entire procedure between opening and closing.    Ernesto Schmitt MD  Date of Service (when I saw the patient): 11/6/2024  The transplant fellow, Dr. Henry Nieto MD PhD, was present and assisted with all aspects of the operation described above from opening to closing.  There was no suitable surgical resident available to assist in this procedure.

## 2024-11-06 NOTE — OR NURSING
Pain block performed without complications.  VSS.  Pt tolerated well.  Will continue to monitor.

## 2024-11-06 NOTE — ANESTHESIA PROCEDURE NOTES
Paravertebral Procedure Note    Pre-Procedure   Staff -        Anesthesiologist:  Trino Mckeon MD       Resident/Fellow: Andrade Rosales MD       Performed By: resident       Location: pre-op       Procedure Start/Stop Times: 11/6/2024 11:00 AM       Pre-Anesthestic Checklist: patient identified, IV checked, site marked, risks and benefits discussed, informed consent, monitors and equipment checked, pre-op evaluation, at physician/surgeon's request and post-op pain management  Timeout:       Correct Patient: Yes        Correct Procedure: Yes        Correct Site: Yes        Correct Position: Yes        Correct Laterality: Yes        Site Marked: Yes  Procedure Documentation  Procedure: Paravertebral       Laterality: left       Patient Position: lateral       Skin prep: Chloraprep       Insertion Site: T9-10, T7-8.       Needle Gauge: 21.        Needle Length (millimeters): 110        Ultrasound guided       1. Ultrasound was used to identify targeted nerve, plexus, vascular marker, or fascial plane and place a needle adjacent to it in real-time.       2. Ultrasound was used to visualize the spread of anesthetic in close proximity to the above referenced structure.       3. A permanent image is entered into the patient's record.       4. The visualized anatomic structures appeared normal.       5. There were no apparent abnormal pathologic findings.    Assessment/Narrative         The placement was negative for: blood aspirated, painful injection and site bleeding       Paresthesias: No.       Bolus given via needle..        Secured via.        Insertion/Infusion Method: Single Shot       Complications: none    Medication(s) Administered   Bupivacaine 0.25% PF (Infiltration) - Infiltration   10 mL - 11/6/2024 11:00:00 AM  Bupivacaine liposome (Exparel) 1.3% LA inj susp (Infiltration) - Infiltration   20 mL - 11/6/2024 11:00:00 AM  Medication Administration Time: 11/6/2024 11:00 AM      FOR [facility]  "([facility]) ONLY:   Pain Team Contact information: please page the Pain Team Via [employer]. Search \"Pain\". During daytime hours, please page the attending first. At night please page the resident first.      "

## 2024-11-07 VITALS
OXYGEN SATURATION: 99 % | HEART RATE: 78 BPM | RESPIRATION RATE: 16 BRPM | WEIGHT: 171.96 LBS | BODY MASS INDEX: 27.64 KG/M2 | HEIGHT: 66 IN | SYSTOLIC BLOOD PRESSURE: 149 MMHG | TEMPERATURE: 98.1 F | DIASTOLIC BLOOD PRESSURE: 90 MMHG

## 2024-11-07 LAB — GLUCOSE BLDC GLUCOMTR-MCNC: 116 MG/DL (ref 70–99)

## 2024-11-07 PROCEDURE — 999N000248 HC STATISTIC IV INSERT WITH US BY RN

## 2024-11-07 PROCEDURE — 250N000012 HC RX MED GY IP 250 OP 636 PS 637: Performed by: TRANSPLANT SURGERY

## 2024-11-07 PROCEDURE — 250N000013 HC RX MED GY IP 250 OP 250 PS 637: Performed by: PHYSICIAN ASSISTANT

## 2024-11-07 PROCEDURE — 250N000011 HC RX IP 250 OP 636

## 2024-11-07 PROCEDURE — 250N000013 HC RX MED GY IP 250 OP 250 PS 637

## 2024-11-07 PROCEDURE — 82962 GLUCOSE BLOOD TEST: CPT

## 2024-11-07 RX ORDER — DIPHENHYDRAMINE HCL 25 MG
25 CAPSULE ORAL EVERY 6 HOURS PRN
Status: DISCONTINUED | OUTPATIENT
Start: 2024-11-07 | End: 2024-11-07 | Stop reason: HOSPADM

## 2024-11-07 RX ORDER — DIPHENHYDRAMINE HYDROCHLORIDE 50 MG/ML
25 INJECTION INTRAMUSCULAR; INTRAVENOUS EVERY 6 HOURS PRN
Status: DISCONTINUED | OUTPATIENT
Start: 2024-11-07 | End: 2024-11-07 | Stop reason: HOSPADM

## 2024-11-07 RX ORDER — METHOCARBAMOL 750 MG/1
750 TABLET, FILM COATED ORAL 3 TIMES DAILY PRN
Qty: 21 TABLET | Refills: 0 | Status: SHIPPED | OUTPATIENT
Start: 2024-11-07

## 2024-11-07 RX ORDER — BISACODYL 10 MG
10 SUPPOSITORY, RECTAL RECTAL ONCE
Status: COMPLETED | OUTPATIENT
Start: 2024-11-07 | End: 2024-11-07

## 2024-11-07 RX ORDER — OXYCODONE HYDROCHLORIDE 5 MG/1
5 TABLET ORAL EVERY 4 HOURS PRN
Qty: 12 TABLET | Refills: 0 | Status: SHIPPED | OUTPATIENT
Start: 2024-11-07

## 2024-11-07 RX ORDER — ONDANSETRON 4 MG/1
4 TABLET, ORALLY DISINTEGRATING ORAL EVERY 6 HOURS PRN
Qty: 6 TABLET | Refills: 0 | Status: SHIPPED | OUTPATIENT
Start: 2024-11-07

## 2024-11-07 RX ORDER — METHOCARBAMOL 750 MG/1
750 TABLET, FILM COATED ORAL 3 TIMES DAILY
Status: DISCONTINUED | OUTPATIENT
Start: 2024-11-07 | End: 2024-11-07 | Stop reason: HOSPADM

## 2024-11-07 RX ORDER — ACETAMINOPHEN 325 MG/1
650 TABLET ORAL EVERY 6 HOURS
Status: SHIPPED
Start: 2024-11-07

## 2024-11-07 RX ORDER — PANTOPRAZOLE SODIUM 40 MG/1
40 TABLET, DELAYED RELEASE ORAL
Status: DISCONTINUED | OUTPATIENT
Start: 2024-11-07 | End: 2024-11-07 | Stop reason: HOSPADM

## 2024-11-07 RX ORDER — LEVOFLOXACIN 750 MG/1
750 TABLET, FILM COATED ORAL DAILY
Qty: 6 TABLET | Refills: 0 | Status: ACTIVE | OUTPATIENT
Start: 2024-11-08

## 2024-11-07 RX ORDER — AMOXICILLIN 250 MG
1 CAPSULE ORAL 2 TIMES DAILY
Qty: 60 TABLET | Refills: 0 | Status: SHIPPED | OUTPATIENT
Start: 2024-11-07

## 2024-11-07 RX ADMIN — ONDANSETRON 4 MG: 4 TABLET, ORALLY DISINTEGRATING ORAL at 01:29

## 2024-11-07 RX ADMIN — POLYETHYLENE GLYCOL 3350 17 G: 17 POWDER, FOR SOLUTION ORAL at 08:36

## 2024-11-07 RX ADMIN — PROCHLORPERAZINE MALEATE 10 MG: 5 TABLET ORAL at 06:12

## 2024-11-07 RX ADMIN — OXYCODONE HYDROCHLORIDE 10 MG: 10 TABLET ORAL at 06:12

## 2024-11-07 RX ADMIN — SENNOSIDES AND DOCUSATE SODIUM 1 TABLET: 8.6; 5 TABLET ORAL at 08:37

## 2024-11-07 RX ADMIN — LEVOFLOXACIN 750 MG: 750 TABLET, FILM COATED ORAL at 08:37

## 2024-11-07 RX ADMIN — PANTOPRAZOLE SODIUM 40 MG: 40 TABLET, DELAYED RELEASE ORAL at 07:06

## 2024-11-07 RX ADMIN — OXYCODONE HYDROCHLORIDE 10 MG: 10 TABLET ORAL at 01:21

## 2024-11-07 RX ADMIN — ACETAMINOPHEN 650 MG: 325 TABLET, FILM COATED ORAL at 08:37

## 2024-11-07 RX ADMIN — METHOCARBAMOL TABLETS 750 MG: 750 TABLET, COATED ORAL at 13:46

## 2024-11-07 RX ADMIN — TACROLIMUS 1.5 MG: 1 CAPSULE ORAL at 08:37

## 2024-11-07 RX ADMIN — METHOCARBAMOL TABLETS 750 MG: 750 TABLET, COATED ORAL at 08:37

## 2024-11-07 RX ADMIN — BISACODYL 10 MG: 10 SUPPOSITORY RECTAL at 14:22

## 2024-11-07 RX ADMIN — ACETAMINOPHEN 650 MG: 325 TABLET, FILM COATED ORAL at 03:05

## 2024-11-07 ASSESSMENT — ACTIVITIES OF DAILY LIVING (ADL)
ADLS_ACUITY_SCORE: 0

## 2024-11-07 NOTE — PROGRESS NOTES
"  Transplant Surgery Progress Note  Surgery Cross-Cover  Post Op Check    11/06/2024    Berta Nava is a 59 year old female POD#0 s/p Procedure(s):  open incisional hernia repair WITH MESH for Pre-Op Diagnosis Codes:      * Hernia, incisional [K43.2]    Pt reports their pain is controlled with current regimen. Reports nausea, no emesis. Denies SOB, chest pain, or dizziness. Patient is not passing flatus or having bowel movements and has not yet voided.     BP (!) 154/82   Pulse 65   Temp 97.8  F (36.6  C) (Oral)   Resp 16   Ht 1.676 m (5' 6\")   Wt 78 kg (171 lb 15.3 oz)   LMP  (LMP Unknown)   SpO2 100%   BMI 27.75 kg/m      Gen: A&O x4, NAD   Chest: breathing non-labored on room air    Abdomen: soft, non-tender, non-distended, no guarding or rigidity   Incision: clean, dry, intact closed with dermabond  Extremities: warm and well perfused, peripheral pulses palpable     A/P:   -Will monitor for spontaneous void and bladder scan if indicated  -Continue prn zofran and compazine for nausea.   -Continue plan of care per primary team. Please call with any questions.  -Encourage IS use and SCDs to remain on while in bed     Arnold Buck MD  General Surgery PGY-1    **For on call schedules and contact information, please refer to [facility]**      "

## 2024-11-07 NOTE — PHARMACY-ADMISSION MEDICATION HISTORY
Pharmacist Admission Medication History    Admission medication history is complete. The information provided in this note is only as accurate as the sources available at the time of the update.    Information Source(s): CareEverywhere/SureScripts and pre-op nurse interview for times of prior to admit doses.    Pertinent Information: SureScripts dispense records match home medication list well.    Changes made to PTA medication list:  Added: None  Deleted: None  Changed: None    Allergies reviewed with patient and updates made in EHR: no    Medication History Completed By: Juventino Abdullahi [facility] 11/7/2024 8:12 AM    PTA Med List   Medication Sig Last Dose/Taking    lisinopril (ZESTRIL) 10 MG tablet Take 1 tablet (10 mg) by mouth daily (Patient taking differently: Take 10 mg by mouth every morning.) 11/5/2024    multivitamin (CENTRUM SILVER) tablet Take 1 tablet by mouth every morning. Past Week    tacrolimus (GENERIC) 0.5 MG capsule Take 1 capsule (0.5 mg) by mouth every 12 hours Total dose: 1.5 mg BID (Patient taking differently: Take 0.5 mg by mouth 2 times daily. Total dose: 1.5 mg BID) 11/6/2024 Morning    tacrolimus (GENERIC) 1 MG capsule Take 1 capsule (1 mg) by mouth every 12 hours Total dose: 1.5 mg BID (Patient taking differently: Take 1 mg by mouth 2 times daily. Total dose: 1.5 mg BID) 11/6/2024 Morning

## 2024-11-07 NOTE — PLAN OF CARE
"BP (!) 159/85 (BP Location: Right arm)   Pulse 74   Temp 98.7  F (37.1  C) (Oral)   Resp 16   Ht 1.676 m (5' 6\")   Wt 78 kg (171 lb 15.3 oz)   LMP  (LMP Unknown)   SpO2 99%   BMI 27.75 kg/m   AVSS on RA. Patient c/o incisional pain.Prn Oxycodone 10 mg x 1. Scheduled Tylenol x 1. Patient c/o nausea at 2400, prn Zofran 4 mg ODT given x 1 with relief.. Urine Output - unable to void. Bladder scan >508. Pt feeling uncomfortable with full bladder. Straight cathed for 475 ml clear yellow. Bowel Function - No BM this shift. Not passing gas. Hypoactive BS. Nutrition - clears, ADAT. Pt not interested in eating. Tolerated ice water. Activity - SBA/I to bathroom and back. Steady on feet..Pt stated that she thought she may be going home today.                          "

## 2024-11-07 NOTE — DISCHARGE SUMMARY
I evaluated the patient today.  I reviewed the discharge plan with the fellow, and agree with the final assessment and plan for discharge as noted in the discharge summary.  I personally spent  30 minutes or less  today on discharge activities for this patient.      Ernesto Schmitt MD, MAICOL  Professor of Surgery  [employer]  Surgical Director of Liver Transplant Program  Executive Medical Director of Pediatric Transplantation   [facility]    Discharge Summary  Transplant Surgery    Date of Admission:  11/6/2024  Date of Discharge:  11/7/2024  6:04 PM  Discharging Provider: Dr. Schmitt/Shannan Umanzor PA-C  Date of Service (when I saw the patient): 11/07/24    Discharge Diagnoses   S/p liver transplant  Immunosuppressed status  S/p hernia repair  Post op urinary retention    Procedure/Surgery Information   Procedure: Procedure(s):  open incisional hernia repair WITH MESH   Surgeon(s): Surgeons and Role:     * Ernesto Schmitt MD - Primary     * Ozzie George MD - Resident - Assisting     * Henry León MD - Fellow - Assisting             History of Present Illness   Berta Nava is a 59 year old female with a past medical history significant for PSC, s/p liver transplant 11/18/2021, who now presents after incisional hernia repair    Hospital Course   Berta Nava was admitted on 11/6/2024 after incisional hernia repair. She had some urinary retention and was straight cathed, with resolution of retention by POD 1. She was tolerating fluids and some oral intake, ambulating, and pain was well controlled with oral medications prior to discharge.    Transplant coordinator: Vanessa Durant [phone]    Post-operative pain control: included oxycodone and robaxin on discharge.     Antibiotics prescribed at discharge: Levaquin, Duration: 7 days     Discharge Disposition   Discharged to home   Condition at discharge:  Stable    Primary Care Physician   Afshan Penaloza    Physical Exam   Temp: 98.1  F (36.7  C) Temp src: Oral BP: (!) 149/90 Pulse: 78   Resp: 16 SpO2: 99 % O2 Device: None (Room air)    Vitals:    11/06/24 0923   Weight: 78 kg (171 lb 15.3 oz)     Vital Signs with Ranges  Temp:  [98.1  F (36.7  C)-98.7  F (37.1  C)] 98.1  F (36.7  C)  Pulse:  [74-78] 78  Resp:  [16] 16  BP: (149-161)/(85-90) 149/90  SpO2:  [99 %] 99 %  I/O last 3 completed shifts:  In: 700 [P.O.:700]  Out: 1929 [Urine:1929]    Constitutional: Awake, alert, cooperative, no apparent distress, and appears stated age.  Eyes: Lids and lashes normal, pupils equal, round, extra ocular muscles intact, sclera clear, conjunctiva normal.  ENT: Normocephalic, without obvious abnormality, atraumatic  Respiratory: Nonlabored resps on RA  Cardiovascular: Perfused  GI: L lateral Incision c/d/I with surrounding ecchymoses  Genitourinary:  Voiding without retention prior to discharge  Neurologic: Awake, alert, oriented to name, place and time.  Neuropsychiatric: Calm, normal eye contact, alert, normal affect, oriented to self, place, time and situation, memory for past and recent events intact and thought process normal.    Consultations This Hospital Stay   NURSING TO CONSULT FOR VASCULAR ACCESS CARE IP CONSULT    Time Spent on this Encounter   I have spent less than 30 minutes on this discharge.    Discharge Orders   Discharge Medications   Current Discharge Medication List        CONTINUE these medications which have NOT CHANGED    Details   lisinopril (ZESTRIL) 10 MG tablet Take 1 tablet (10 mg) by mouth daily  Qty: 90 tablet, Refills: 3    Associated Diagnoses: Essential hypertension      multivitamin (CENTRUM SILVER) tablet Take 1 tablet by mouth every morning.      !! tacrolimus (GENERIC) 0.5 MG capsule Take 1 capsule (0.5 mg) by mouth every 12 hours Total dose: 1.5 mg BID  Qty: 180 capsule, Refills: 3    Comments: TXP DT 11/18/2021 (Liver) TXP Dischg DT  11/27/2021 DX Liver replaced by transplant Z94.4 TX [facility] ([place], [place])  Associated Diagnoses: S/P liver transplant (H)      !! tacrolimus (GENERIC) 1 MG capsule Take 1 capsule (1 mg) by mouth every 12 hours Total dose: 1.5 mg BID  Qty: 180 capsule, Refills: 3    Comments: TXP DT 11/18/2021 (Liver) TXP Dischg DT 11/27/2021 DX Liver replaced by transplant Z94.4 TX [facility] ([place], [place])  Associated Diagnoses: Liver replaced by transplant (H)       !! - Potential duplicate medications found. Please discuss with provider.          No discharge procedures on file.

## 2024-11-07 NOTE — PLAN OF CARE
"Goal Outcome Evaluation:      Plan of Care Reviewed With: patient    Overall Patient Progress: improvingOverall Patient Progress: improving    Outcome Evaluation: Voiding without difficulty. Pt need to have a bowel movement    BP (!) 149/90 (BP Location: Right arm)   Pulse 78   Temp 98.1  F (36.7  C) (Oral)   Resp 16   Ht 1.676 m (5' 6\")   Wt 78 kg (171 lb 15.3 oz)   LMP  (LMP Unknown)   SpO2 99%   BMI 27.75 kg/m     Neuro: A/Ox4  VS: VSS on RA  Pain: Denies, on scheduled Robaxin  GI: on regular diet, no appetite. Denies nausea. No BM and not passing flatus yet (suppository was given)  : Voiding without difficulty, PVR 14ml  Skin: Abdominal incision intact BART  Activity - SBA, walking in [address]  Education - encouraging PO intake  Plan of Care - possible discharge this evening after BM   "

## 2024-11-07 NOTE — PLAN OF CARE
Goal Outcome Evaluation:      Plan of Care Reviewed With: patient    Overall Patient Progress: improvingOverall Patient Progress: improving    Outcome Evaluation: Pt educated on discharge instructions and reiterated understanding via teachback method. Daughter providing transport home. PIV removed. Meds obtained via pt from [facility]

## 2024-11-11 ENCOUNTER — OFFICE VISIT (OUTPATIENT)
Dept: TRANSPLANT | Facility: CLINIC | Age: 59
End: 2024-11-11
Payer: COMMERCIAL

## 2024-11-11 DIAGNOSIS — Z94.4 LIVER TRANSPLANTED (H): Primary | ICD-10-CM

## 2024-11-11 PROCEDURE — 99213 OFFICE O/P EST LOW 20 MIN: CPT | Performed by: TRANSPLANT SURGERY

## 2024-11-11 NOTE — LETTER
11/11/2024      Berta Nava  [address] [facility] [id number]      Dear Colleague,    Thank you for referring your patient, Berta Nava, to the [facility]. Please see a copy of my visit note below.    Transplant Surgery -OUTPATIENT IMMUNOSUPPRESSION PROGRESS NOTE    Date of Visit: 11/11/2024    Transplants:  11/18/2021 (Liver); Postoperative day:  1089  ASSESMENT AND PLAN:  1.Graft Function:Liver allograft: no rejection or technical problems.    2.Immunosuppression Management:keep tacrolimus levels at 5-6 ng/dL  3.Hypertension:ok  4.Renal Function:ok  5.Lab frequency:monthly  6.Other:  Hernia wound has healed well.  Not  to lift weights > 15 lb for 3 months    Date: November 11, 2024    Transplant:  [x]                             Liver [x]                              Kidney []                             Pancreas []                              Other:             Chief Complaint:  Doing well    History of Present Illness:  Patient Active Problem List   Diagnosis     Hypokalemia     Essential hypertension     Hyperlipidemia     Mixed hearing loss, bilateral     Neoplasm of uncertain behavior of skin     Sicca syndrome (H)     Ulcerative pancolitis with complication (H)     Primary sclerosing cholangitis (H)     Acute kidney failure, unspecified (H)     Immunosuppressed status (H)     S/P liver transplant (H)     Anemia due to chronic kidney disease     Liver transplant rejection (H)     Combined forms of age-related cataract of both eyes     S/P herniorrhaphy     SOCIAL /FAMILY HISTORY: [x]                  No recent change    Past Medical History:   Diagnosis Date     Ascites      Biliary cirrhosis (H)      Cholangitis, sclerosing (H)      Cirrhosis of liver with ascites (H) 03/03/2021     Hearing loss of left ear     wears a hearing aide     History of blood transfusion      History of low potassium      Hyperlipidemia      Hypertension      Liver transplant rejection  (H) 05/17/2022    Mild Rejection     SBP (spontaneous bacterial peritonitis) (H) 04/30/2021     Sjogren's syndrome (H)      Ulcerative colitis (H)      Ulcerative pancolitis (H)      Past Surgical History:   Procedure Laterality Date     BENCH LIVER N/A 11/18/2021    Procedure: Bench liver;  Surgeon: Ernesto Schmitt MD;  Location: [facility] OR     CATARACT IOL, RT/LT       COLONOSCOPY N/A 06/08/2022    Procedure: COLONOSCOPY, WITH BIOPSY;  Surgeon: Leventhal, Thomas Michael, MD;  Location: [facility] OR     COLONOSCOPY N/A 10/31/2022    Procedure: COLONOSCOPY, WITH POLYPECTOMY AND BIOPSY;  Surgeon: Kendrick Crawford MD;  Location: [facility] OR     COLONOSCOPY N/A 10/31/2023    Procedure: Colonoscopy with biopsy and polypectomy;  Surgeon: Kendrick Crawford MD;  Location: [facility] OR     COMBINED ESOPHAGOSCOPY, GASTROSCOPY, DUODENOSCOPY (EGD), REMOVE BILIARY STENT N/A 02/09/2022    Procedure: ESOPHAGOGASTRODUODENOSCOPY, WITH BILIARY STENT REMOVAL;  Surgeon: Leventhal, Thomas Michael, MD;  Location: [facility] OR     CV CORONARY ANGIOGRAM N/A 08/25/2021    Procedure: Coronary Angiogram with possible intervention;  Surgeon: David Wilhelm MD;  Location:  [facility]     ENDOSCOPIC RETROGRADE CHOLANGIOPANCREATOGRAM N/A 06/25/2021    Procedure: ENDOSCOPIC RETROGRADE CHOLANGIOPANCREATOGRAPHY with ballon sweep of bile ducts for stones, balloon dilation of bile ducts and bile duct stent placement;  Surgeon: Gregory Gabriel MD;  Location:  OR     ENDOSCOPIC RETROGRADE CHOLANGIOPANCREATOGRAM N/A 07/22/2021    Procedure: ENDOSCOPIC RETROGRADE CHOLANGIOPANCREATOGRAPHY STONE REMOVAL, DILATION AND STENT PLACEMENT;  Surgeon: Gregory Gabriel MD;  Location:  OR     ENDOSCOPIC RETROGRADE CHOLANGIOPANCREATOGRAPHY, EXCHANGE TUBE/STENT N/A 05/05/2021    Procedure: ENDOSCOPIC RETROGRADE CHOLANGIOPANCREATOGRAPHY WITH STENT EXCHANGE, STONE EXTRACTION, AND DILATION;  Surgeon: Gregory Gabriel MD;  Location:  OR      ENDOSCOPIC RETROGRADE CHOLANGIOPANCREATOGRAPHY, EXCHANGE TUBE/STENT N/A 05/10/2021    Procedure: ENDOSCOPIC RETROGRADE CHOLANGIOPANCREATOGRAPHY with biliary dilation, stone removal, stent exchange;  Surgeon: Gregory Gabriel MD;  Location: [facility] OR     ENDOSCOPIC RETROGRADE CHOLANGIOPANCREATOGRAPHY, EXCHANGE TUBE/STENT N/A 06/10/2021    Procedure: ENDOSCOPIC RETROGRADE CHOLANGIOPANCREATOGRAPHY, WITH biliary stent exchange, stone removal;  Surgeon: Woodrow Lott MD;  Location: [facility] OR     ENDOSCOPIC RETROGRADE CHOLANGIOPANCREATOGRAPHY, EXCHANGE TUBE/STENT N/A 08/30/2021    Procedure: ENDOSCOPIC RETROGRADE CHOLANGIOPANCREATOGRAPHY with biliary dilation, stone removal, stent exchange;  Surgeon: Gregory Gabriel MD;  Location: [facility] OR     ENDOSCOPIC RETROGRADE CHOLANGIOPANCREATOGRAPHY, EXCHANGE TUBE/STENT N/A 10/01/2021    Procedure: ENDOSCOPIC RETROGRADE CHOLANGIOPANCREATOGRAPHY with balloon sweep of bile ducts, bile duct stent exchanged;  Surgeon: Gregory Gabriel MD;  Location: [facility] OR     ESOPHAGOSCOPY, GASTROSCOPY, DUODENOSCOPY (EGD), COMBINED N/A 09/04/2021    Procedure: ESOPHAGOGASTRODUODENOSCOPY (EGD);  Surgeon: Leventhal, Thomas Michael, MD;  Location: [facility] GI     ESOPHAGOSCOPY, GASTROSCOPY, DUODENOSCOPY (EGD), COMBINED N/A 10/01/2021    Procedure: ESOPHAGOGASTRODUODENOSCOPY (EGD) with varices banding;  Surgeon: Gregory Gabriel MD;  Location: [facility] OR     GYN SURGERY      bilat fallopian tubes and ovaries removed     HERNIORRHAPHY INCISIONAL (LOCATION) Left 11/6/2024    Procedure: open incisional hernia repair WITH MESH;  Surgeon: Ernesto Schmitt MD;  Location: [facility] OR     IR ERCP  12/04/2020     IR ERCP  01/06/2021     IR LIVER BIOPSY PERCUTANEOUS  05/17/2022     PHACOEMULSIFICATION CLEAR CORNEA WITH TORIC INTRAOCULAR LENS IMPLANT Right 07/11/2024    Procedure: RIGHT EYE PHACOEMULSIFICATION, CATARACT, WITH INTRAOCULAR LENS IMPLANT, TORIC LENS;  Surgeon: Dory Palmer MD;  Location: [facility] OR     PICC DOUBLE LUMEN  PLACEMENT Right 2021    42cm (2cm external), Lateral brachial vein     RETURN LIVER TRANSPLANT N/A 2021    Procedure: CLOSURE, WOUND, ABDOMEN, SECONDARY, ABDOMINAL WASHOUT;  Surgeon: Ernesto Schmitt MD;  Location: [facility] OR     TRANSPLANT  2021    Liver due to PSC     TRANSPLANT LIVER RECIPIENT  DONOR N/A 2021    Procedure: Opening of abdomen, Abdominal Exploration and aborted liver transplant.;  Surgeon: Ernesto Schmitt MD;  Location: [facility] OR     TRANSPLANT LIVER RECIPIENT  DONOR N/A 2021    Procedure: TRANSPLANT, LIVER, RECIPIENT,  DONOR;  Surgeon: Ernesto Schmitt MD;  Location: [facility] OR     Social History     Socioeconomic History     Marital status:      Spouse name: Not on file     Number of children: Not on file     Years of education: Not on file     Highest education level: Not on file   Occupational History     Not on file   Tobacco Use     Smoking status: Never     Passive exposure: Never     Smokeless tobacco: Never   Vaping Use     Vaping status: Never Used   Substance and Sexual Activity     Alcohol use: No     Comment: Last drink was      Drug use: No     Sexual activity: Not Currently     Partners: Male     Birth control/protection: Post-menopausal   Other Topics Concern     Parent/sibling w/ CABG, MI or angioplasty before 65F 55M? Yes     Comment: Father had heart attack and received heart transplant   Social History Narrative     Not on file     Social Drivers of Health     Financial Resource Strain: Low Risk  (2024)    Financial Resource Strain      Within the past 12 months, have you or your family members you live with been unable to get utilities (heat, electricity) when it was really needed?: No   Food Insecurity: Low Risk  (2024)    Food Insecurity      Within the past 12 months, did you worry that your food would run out before you got money to buy more?: No      Within the past 12 months, did the food you  bought just not last and you didn t have money to get more?: No   Transportation Needs: Low Risk  (11/6/2024)    Transportation Needs      Within the past 12 months, has lack of transportation kept you from medical appointments, getting your medicines, non-medical meetings or appointments, work, or from getting things that you need?: No   Physical Activity: Sufficiently Active (5/20/2024)    Exercise Vital Sign      Days of Exercise per Week: 5 days      Minutes of Exercise per Session: 60 min   Stress: Stress Concern Present (5/20/2024)    [employer] - Occupational Stress Questionnaire      Feeling of Stress : To some extent   Social Connections: Unknown (5/20/2024)    Social Connection and Isolation Panel [NHANES]      Frequency of Communication with Friends and Family: Not on file      Frequency of Social Gatherings with Friends and Family: Once a week      Attends Yarsanism Services: Not on file      Active Member of Clubs or Organizations: Not on file      Attends Club or Organization Meetings: Not on file      Marital Status: Not on file   Interpersonal Safety: Low Risk  (11/6/2024)    Interpersonal Safety      Do you feel physically and emotionally safe where you currently live?: Yes      Within the past 12 months, have you been hit, slapped, kicked or otherwise physically hurt by someone?: No      Within the past 12 months, have you been humiliated or emotionally abused in other ways by your partner or ex-partner?: No   Housing Stability: Low Risk  (11/6/2024)    Housing Stability      Do you have housing? : Yes      Are you worried about losing your housing?: No     Prescription Medications as of 11/11/2024         Rx Number Disp Refills Start End Last Dispensed Date Next Fill Date Owning Pharmacy    acetaminophen (TYLENOL) 325 MG tablet  -- -- 11/7/2024 --       Sig: Take 2 tablets (650 mg) by mouth every 6 hours. Max 2 g per day    Class: No Print Out    Route: Oral     levofloxacin (LEVAQUIN) 750 MG tablet  6 tablet 0 11/8/2024 --   [address]    Sig: Take 1 tablet (750 mg) by mouth daily.    Class: E-Prescribe    Route: Oral    lisinopril (ZESTRIL) 10 MG tablet  90 tablet 3 5/21/2024 --   [facility][id number] [address] [place] [address]    Sig: Take 1 tablet (10 mg) by mouth daily    Class: E-Prescribe    Route: Oral    methocarbamol (ROBAXIN) 750 MG tablet  21 tablet 0 11/7/2024 --   [address]    Sig: Take 1 tablet (750 mg) by mouth 3 times daily as needed for muscle spasms.    Class: E-Prescribe    Route: Oral    multivitamin (CENTRUM SILVER) tablet  -- --  --       Sig: Take 1 tablet by mouth every morning.    Class: Historical    Route: Oral    ondansetron (ZOFRAN ODT) 4 MG ODT tab  6 tablet 0 11/7/2024 --   [address]    Sig: Take 1 tablet (4 mg) by mouth every 6 hours as needed for nausea or vomiting.    Class: E-Prescribe    Route: Oral    oxyCODONE (ROXICODONE) 5 MG tablet  12 tablet 0 11/7/2024 --   [address]    Sig: Take 1 tablet (5 mg) by mouth every 4 hours as needed for moderate pain.    Class: E-Prescribe    Earliest Fill Date: 11/7/2024    Route: Oral    senna-docusate (SENOKOT-S/PERICOLACE) 8.6-50 MG tablet  60 tablet 0 11/7/2024 --   [address]    Sig: Take 1 tablet by mouth 2 times daily. While taking narcotics, hold for loose stools    Class: E-Prescribe    Route: Oral    tacrolimus (GENERIC) 0.5 MG capsule  180 capsule 3 12/12/2023 --   [facility][id number] [address] [place] [address]    Sig: Take 1 capsule (0.5 mg) by mouth every 12 hours Total dose: 1.5 mg BID     Class: E-Prescribe    Notes to Pharmacy: TXP DT 11/18/2021 (Liver) TXP Dischg DT 11/27/2021 DX Liver replaced by transplant Z94.4 TX [facility] ([place], [place])    Route: Oral    tacrolimus (GENERIC) 1 MG capsule  180 capsule 3 12/12/2023 --   [facility] #[id number] - [place], [place] - [address] [place] [address] [address] [facility] [address]    Sig: Take 1 capsule (1 mg) by mouth every 12 hours Total dose: 1.5 mg BID    Class: E-Prescribe    Notes to Pharmacy: TXP DT 11/18/2021 (Liver) TXP Dischg DT 11/27/2021 DX Liver replaced by transplant Z94.4 [facility] ([place], [place])    Route: Oral          Iodinated contrast media and Contrast dye   REVIEW OF SYSTEMS (check box if normal)  [x]               GENERAL  [x]                 PULMONARY [x]                GENITOURINARY  [x]                CNS                 [x]                 CARDIAC  [x]                 ENDOCRINE  [x]                EARS,NOSE,THROAT [x]                 GASTROINTESTINAL [x]                 NEUROLOGIC    [x]                MUSCLOSKELTAL  [x]                  HEMATOLOGY      PHYSICAL EXAM (check box if normal)LMP  (LMP Unknown)         [x]            GENERAL:    [x]       EYES:  ICTERIC   []        YES  []                    NO  [x]           EXTREMITIES: Clubbing []       Y     [x]           N    [x]           EARS, NOSE, THROAT: Membranes Moist    YES   [x]                   NO []                  [x]           LUNGS:  CLEAR    YES       [x]                  NO    []                                [x]           SKIN: Jaundice           YES       []                  NO    [x]                   Rash: YES       []                  NO    [x]                                     [x]             HEART: Regular Rate          YES       [x]                  NO    []                   Incision Clean:  YES       [x]                  NO    []                                 [x]                    ABDOMEN: Wound healed well Organomegaly YES       []                  NO    [x]                       [x]                    NEUROLOGICAL:  Nonfocal  YES       [x]                  NO    []                       [x]                    Hernia YES       []                  NO    [x]                   PSYCHIATRIC:  Appropriate  YES       [x]                  NO    []                       OTHER:                                                                                                   PAIN SCALE:: 3       Again, thank you for allowing me to participate in the care of your patient.        Sincerely,        Ernesto Schmitt MD

## 2024-11-11 NOTE — PROGRESS NOTES
Transplant Surgery -OUTPATIENT IMMUNOSUPPRESSION PROGRESS NOTE    Date of Visit: 11/11/2024    Transplants:  11/18/2021 (Liver); Postoperative day:  1089  ASSESMENT AND PLAN:  1.Graft Function:Liver allograft: no rejection or technical problems.    2.Immunosuppression Management:keep tacrolimus levels at 5-6 ng/dL  3.Hypertension:ok  4.Renal Function:ok  5.Lab frequency:monthly  6.Other:  Hernia wound has healed well.  Not  to lift weights > 15 lb for 3 months    Date: November 11, 2024    Transplant:  [x]                             Liver [x]                              Kidney []                             Pancreas []                              Other:             Chief Complaint:  Doing well    History of Present Illness:  Patient Active Problem List   Diagnosis    Hypokalemia    Essential hypertension    Hyperlipidemia    Mixed hearing loss, bilateral    Neoplasm of uncertain behavior of skin    Sicca syndrome (H)    Ulcerative pancolitis with complication (H)    Primary sclerosing cholangitis (H)    Acute kidney failure, unspecified (H)    Immunosuppressed status (H)    S/P liver transplant (H)    Anemia due to chronic kidney disease    Liver transplant rejection (H)    Combined forms of age-related cataract of both eyes    S/P herniorrhaphy     SOCIAL /FAMILY HISTORY: [x]                  No recent change    Past Medical History:   Diagnosis Date    Ascites     Biliary cirrhosis (H)     Cholangitis, sclerosing (H)     Cirrhosis of liver with ascites (H) 03/03/2021    Hearing loss of left ear     wears a hearing aide    History of blood transfusion     History of low potassium     Hyperlipidemia     Hypertension     Liver transplant rejection (H) 05/17/2022    Mild Rejection    SBP (spontaneous bacterial peritonitis) (H) 04/30/2021    Sjogren's syndrome (H)     Ulcerative colitis (H)     Ulcerative pancolitis (H)      Past Surgical History:   Procedure Laterality Date    BENCH LIVER N/A 11/18/2021     Procedure: Bench liver;  Surgeon: Ernesto Schmitt MD;  Location: [facility] OR    CATARACT IOL, RT/LT      COLONOSCOPY N/A 06/08/2022    Procedure: COLONOSCOPY, WITH BIOPSY;  Surgeon: Leventhal, Thomas Michael, MD;  Location: [facility] OR    COLONOSCOPY N/A 10/31/2022    Procedure: COLONOSCOPY, WITH POLYPECTOMY AND BIOPSY;  Surgeon: Kendrick Crawford MD;  Location: [facility] OR    COLONOSCOPY N/A 10/31/2023    Procedure: Colonoscopy with biopsy and polypectomy;  Surgeon: Kendrick Crawford MD;  Location: [facility] OR    COMBINED ESOPHAGOSCOPY, GASTROSCOPY, DUODENOSCOPY (EGD), REMOVE BILIARY STENT N/A 02/09/2022    Procedure: ESOPHAGOGASTRODUODENOSCOPY, WITH BILIARY STENT REMOVAL;  Surgeon: Leventhal, Thomas Michael, MD;  Location: [facility] OR    CV CORONARY ANGIOGRAM N/A 08/25/2021    Procedure: Coronary Angiogram with possible intervention;  Surgeon: David Wilhelm MD;  Location:  [facility]    ENDOSCOPIC RETROGRADE CHOLANGIOPANCREATOGRAM N/A 06/25/2021    Procedure: ENDOSCOPIC RETROGRADE CHOLANGIOPANCREATOGRAPHY with ballon sweep of bile ducts for stones, balloon dilation of bile ducts and bile duct stent placement;  Surgeon: Gregory Gabriel MD;  Location:  OR    ENDOSCOPIC RETROGRADE CHOLANGIOPANCREATOGRAM N/A 07/22/2021    Procedure: ENDOSCOPIC RETROGRADE CHOLANGIOPANCREATOGRAPHY STONE REMOVAL, DILATION AND STENT PLACEMENT;  Surgeon: Gregory Gabriel MD;  Location:  OR    ENDOSCOPIC RETROGRADE CHOLANGIOPANCREATOGRAPHY, EXCHANGE TUBE/STENT N/A 05/05/2021    Procedure: ENDOSCOPIC RETROGRADE CHOLANGIOPANCREATOGRAPHY WITH STENT EXCHANGE, STONE EXTRACTION, AND DILATION;  Surgeon: Gregory Gabriel MD;  Location:  OR    ENDOSCOPIC RETROGRADE CHOLANGIOPANCREATOGRAPHY, EXCHANGE TUBE/STENT N/A 05/10/2021    Procedure: ENDOSCOPIC RETROGRADE CHOLANGIOPANCREATOGRAPHY with biliary dilation, stone removal, stent exchange;  Surgeon: Gregory Gabriel MD;  Location:  OR    ENDOSCOPIC RETROGRADE  CHOLANGIOPANCREATOGRAPHY, EXCHANGE TUBE/STENT N/A 06/10/2021    Procedure: ENDOSCOPIC RETROGRADE CHOLANGIOPANCREATOGRAPHY, WITH biliary stent exchange, stone removal;  Surgeon: Woodrow Lott MD;  Location: [facility]    ENDOSCOPIC RETROGRADE CHOLANGIOPANCREATOGRAPHY, EXCHANGE TUBE/STENT N/A 08/30/2021    Procedure: ENDOSCOPIC RETROGRADE CHOLANGIOPANCREATOGRAPHY with biliary dilation, stone removal, stent exchange;  Surgeon: Gregory Gabriel MD;  Location: [facility] OR    ENDOSCOPIC RETROGRADE CHOLANGIOPANCREATOGRAPHY, EXCHANGE TUBE/STENT N/A 10/01/2021    Procedure: ENDOSCOPIC RETROGRADE CHOLANGIOPANCREATOGRAPHY with balloon sweep of bile ducts, bile duct stent exchanged;  Surgeon: Gregory Gabriel MD;  Location: [facility]    ESOPHAGOSCOPY, GASTROSCOPY, DUODENOSCOPY (EGD), COMBINED N/A 09/04/2021    Procedure: ESOPHAGOGASTRODUODENOSCOPY (EGD);  Surgeon: Leventhal, Thomas Michael, MD;  Location: [facility]    ESOPHAGOSCOPY, GASTROSCOPY, DUODENOSCOPY (EGD), COMBINED N/A 10/01/2021    Procedure: ESOPHAGOGASTRODUODENOSCOPY (EGD) with varices banding;  Surgeon: Gregory Gabriel MD;  Location: [facility] OR    GYN SURGERY      bilat fallopian tubes and ovaries removed    HERNIORRHAPHY INCISIONAL (LOCATION) Left 11/6/2024    Procedure: open incisional hernia repair WITH MESH;  Surgeon: Ernesto Schmitt MD;  Location: [facility] OR    IR ERCP  12/04/2020    IR ERCP  01/06/2021    IR LIVER BIOPSY PERCUTANEOUS  05/17/2022    PHACOEMULSIFICATION CLEAR CORNEA WITH TORIC INTRAOCULAR LENS IMPLANT Right 07/11/2024    Procedure: RIGHT EYE PHACOEMULSIFICATION, CATARACT, WITH INTRAOCULAR LENS IMPLANT, TORIC LENS;  Surgeon: Dory Palmer MD;  Location: [facility] OR    PICC DOUBLE LUMEN PLACEMENT Right 05/08/2021    42cm (2cm external), Lateral brachial vein    RETURN LIVER TRANSPLANT N/A 11/19/2021    Procedure: CLOSURE, WOUND, ABDOMEN, SECONDARY, ABDOMINAL WASHOUT;  Surgeon: Ernesto Schmitt MD;  Location: [facility] OR    TRANSPLANT  11-    Liver due to PSC     TRANSPLANT LIVER RECIPIENT  DONOR N/A 2021    Procedure: Opening of abdomen, Abdominal Exploration and aborted liver transplant.;  Surgeon: Ernesto Schmitt MD;  Location: [facility]    TRANSPLANT LIVER RECIPIENT  DONOR N/A 2021    Procedure: TRANSPLANT, LIVER, RECIPIENT,  DONOR;  Surgeon: Ernesto Schmitt MD;  Location: [facility] OR     Social History     Socioeconomic History    Marital status:      Spouse name: Not on file    Number of children: Not on file    Years of education: Not on file    Highest education level: Not on file   Occupational History    Not on file   Tobacco Use    Smoking status: Never     Passive exposure: Never    Smokeless tobacco: Never   Vaping Use    Vaping status: Never Used   Substance and Sexual Activity    Alcohol use: No     Comment: Last drink was     Drug use: No    Sexual activity: Not Currently     Partners: Male     Birth control/protection: Post-menopausal   Other Topics Concern    Parent/sibling w/ CABG, MI or angioplasty before 65F 55M? Yes     Comment: Father had heart attack and received heart transplant   Social History Narrative    Not on file     Social Drivers of Health     Financial Resource Strain: Low Risk  (2024)    Financial Resource Strain     Within the past 12 months, have you or your family members you live with been unable to get utilities (heat, electricity) when it was really needed?: No   Food Insecurity: Low Risk  (2024)    Food Insecurity     Within the past 12 months, did you worry that your food would run out before you got money to buy more?: No     Within the past 12 months, did the food you bought just not last and you didn t have money to get more?: No   Transportation Needs: Low Risk  (2024)    Transportation Needs     Within the past 12 months, has lack of transportation kept you from medical appointments, getting your medicines, non-medical meetings or appointments, work, or from  getting things that you need?: No   Physical Activity: Sufficiently Active (5/20/2024)    Exercise Vital Sign     Days of Exercise per Week: 5 days     Minutes of Exercise per Session: 60 min   Stress: Stress Concern Present (5/20/2024)    [employer] - Occupational Stress Questionnaire     Feeling of Stress : To some extent   Social Connections: Unknown (5/20/2024)    Social Connection and Isolation Panel [NHANES]     Frequency of Communication with Friends and Family: Not on file     Frequency of Social Gatherings with Friends and Family: Once a week     Attends Sikh Services: Not on file     Active Member of Clubs or Organizations: Not on file     Attends Club or Organization Meetings: Not on file     Marital Status: Not on file   Interpersonal Safety: Low Risk  (11/6/2024)    Interpersonal Safety     Do you feel physically and emotionally safe where you currently live?: Yes     Within the past 12 months, have you been hit, slapped, kicked or otherwise physically hurt by someone?: No     Within the past 12 months, have you been humiliated or emotionally abused in other ways by your partner or ex-partner?: No   Housing Stability: Low Risk  (11/6/2024)    Housing Stability     Do you have housing? : Yes     Are you worried about losing your housing?: No     Prescription Medications as of 11/11/2024         Rx Number Disp Refills Start End Last Dispensed Date Next Fill Date Owning Pharmacy    acetaminophen (TYLENOL) 325 MG tablet  -- -- 11/7/2024 --       Sig: Take 2 tablets (650 mg) by mouth every 6 hours. Max 2 g per day    Class: No Print Out    Route: Oral    levofloxacin (LEVAQUIN) 750 MG tablet  6 tablet 0 11/8/2024 --   [facility] [place], [place] - [address]    Sig: Take 1 tablet (750 mg) by mouth daily.    Class: E-Prescribe    Route: Oral    lisinopril (ZESTRIL) 10 MG tablet  90 tablet 3 5/21/2024 --   [facility][id number] -  [address] [facility] [address] [facility] [address]    Sig: Take 1 tablet (10 mg) by mouth daily    Class: E-Prescribe    Route: Oral    methocarbamol (ROBAXIN) 750 MG tablet  21 tablet 0 11/7/2024 --   [address]    Sig: Take 1 tablet (750 mg) by mouth 3 times daily as needed for muscle spasms.    Class: E-Prescribe    Route: Oral    multivitamin (CENTRUM SILVER) tablet  -- --  --       Sig: Take 1 tablet by mouth every morning.    Class: Historical    Route: Oral    ondansetron (ZOFRAN ODT) 4 MG ODT tab  6 tablet 0 11/7/2024 --   [address]    Sig: Take 1 tablet (4 mg) by mouth every 6 hours as needed for nausea or vomiting.    Class: E-Prescribe    Route: Oral    oxyCODONE (ROXICODONE) 5 MG tablet  12 tablet 0 11/7/2024 --   [address]    Sig: Take 1 tablet (5 mg) by mouth every 4 hours as needed for moderate pain.    Class: E-Prescribe    Earliest Fill Date: 11/7/2024    Route: Oral    senna-docusate (SENOKOT-S/PERICOLACE) 8.6-50 MG tablet  60 tablet 0 11/7/2024 --   [address]    Sig: Take 1 tablet by mouth 2 times daily. While taking narcotics, hold for loose stools    Class: E-Prescribe    Route: Oral    tacrolimus (GENERIC) 0.5 MG capsule  180 capsule 3 12/12/2023 --   [facility] #[id number] - [address] [facility] [address] [facility] [address]    Sig: Take 1 capsule (0.5 mg) by mouth every 12 hours Total dose: 1.5 mg BID    Class: E-Prescribe    Notes to Pharmacy: TXP DT 11/18/2021 (Liver) TXP Dischg DT 11/27/2021 DX Liver replaced by transplant Z94.4 TX [facility] ([place], [place])    Route: Oral    tacrolimus (GENERIC) 1 MG capsule  180 capsule 3 12/12/2023 --   [facility] #[id number] -  [place], [place] - [address] [place] [address] [address]    Sig: Take 1 capsule (1 mg) by mouth every 12 hours Total dose: 1.5 mg BID    Class: E-Prescribe    Notes to Pharmacy: TXP DT 11/18/2021 (Liver) TXP Dischg DT 11/27/2021 DX Liver replaced by transplant Z94.4 TX [facility] ([place], [place])    Route: Oral          Iodinated contrast media and Contrast dye   REVIEW OF SYSTEMS (check box if normal)  [x]               GENERAL  [x]                 PULMONARY [x]                GENITOURINARY  [x]                CNS                 [x]                 CARDIAC  [x]                 ENDOCRINE  [x]                EARS,NOSE,THROAT [x]                 GASTROINTESTINAL [x]                 NEUROLOGIC    [x]                MUSCLOSKELTAL  [x]                  HEMATOLOGY      PHYSICAL EXAM (check box if normal)LMP  (LMP Unknown)         [x]            GENERAL:    [x]       EYES:  ICTERIC   []        YES  []                    NO  [x]           EXTREMITIES: Clubbing []       Y     [x]           N    [x]           EARS, NOSE, THROAT: Membranes Moist    YES   [x]                   NO []                  [x]           LUNGS:  CLEAR    YES       [x]                  NO    []                                [x]           SKIN: Jaundice           YES       []                  NO    [x]                   Rash: YES       []                  NO    [x]                                     [x]             HEART: Regular Rate          YES       [x]                  NO    []                   Incision Clean:  YES       [x]                  NO    []                                [x]                    ABDOMEN: Wound healed well Organomegaly YES       []                  NO    [x]                       [x]                    NEUROLOGICAL:  Nonfocal  YES       [x]                  NO    []                       [x]                    Hernia YES       []                  NO    [x]                    PSYCHIATRIC:  Appropriate  YES       [x]                  NO    []                       OTHER:                                                                                                   PAIN SCALE:: 3

## 2024-11-13 ENCOUNTER — TELEPHONE (OUTPATIENT)
Dept: TRANSPLANT | Facility: CLINIC | Age: 59
End: 2024-11-13
Payer: COMMERCIAL

## 2024-11-13 ASSESSMENT — ENCOUNTER SYMPTOMS: NEW SYMPTOMS OF CORONARY ARTERY DISEASE: 0

## 2024-11-13 NOTE — TELEPHONE ENCOUNTER
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  This has a pretty full time job for her.  She was laid off from work so she is able to help him.  He can't drive.  She is a new grandma - this makes her happy.   She is going to try to only take tylenol for pain now, may take a robaxin at night to help her sleep.  She has an appt to see Dr. Leventhal in December, will get labs at this time.

## 2024-11-13 NOTE — TELEPHONE ENCOUNTER
"Call to Berta to check in post hernia repair.    She saw Dr. Schmitt on Monday for follow-up.  She said pain was \"terrible\".  Surgery was 1 week ago, it has been a rough recovery but she is glad she had it done.  Eating is ok, kind of slow but she still hasn't pooped.  She is passing gas.  She did pas gas and some mucous.  She took some yesterday and today.  I do feel better today.    "

## 2024-12-02 NOTE — Clinical Note
Once she is off MMF for ~ 2 months, can lower Tac goal to 3-5 Okay to stop the aspirin Okay to stop ursodiol Pt requesting to go back to Jammie 2 d/t Jammie 3 malfunctioning many times. Nadia Hayden RD, LD, [facility]

## 2024-12-04 ENCOUNTER — HOSPITAL ENCOUNTER (OUTPATIENT)
Dept: CT IMAGING | Facility: CLINIC | Age: 59
Discharge: HOME | End: 2024-12-04
Payer: COMMERCIAL

## 2024-12-04 DIAGNOSIS — E78.5 HYPERLIPIDEMIA LDL GOAL <130: ICD-10-CM

## 2024-12-04 PROCEDURE — 75571 CT HRT W/O DYE W/CA TEST: CPT

## 2024-12-04 PROCEDURE — 75571 CT HRT W/O DYE W/CA TEST: CPT | Mod: 26 | Performed by: INTERNAL MEDICINE

## 2024-12-05 LAB
CV CALCIUM SCORE AGATSTON LM: 0
CV CALCIUM SCORING AGATSON LAD: 0
CV CALCIUM SCORING AGATSTON CX: 0
CV CALCIUM SCORING AGATSTON RCA: 0
CV CALCIUM SCORING AGATSTON TOTAL: 0

## 2024-12-17 NOTE — PROVIDER NOTIFICATION
Patient stopped in to schedule her referral appointment and also had an interstem appointment also to be scheduled. Please call patient back.    md notified: weighed pt, gain of 10 lb since 4/30, reporting increasingly distended abdomen and new mild MG, reports taking daily Lasix at home with good results. Would you like to place any orders for pt to take Lasix? Thank you.    Addendum: spoke with MD, will defer pt question until tomorrow morning with rounding md. Pt is comfortable with this plan.     MD notified 2100: pt requesting melatonin for sleep; please place orders as you see appropriate. Thank you!!

## 2024-12-18 ENCOUNTER — PATIENT OUTREACH (OUTPATIENT)
Dept: INTERNAL MEDICINE | Facility: CLINIC | Age: 59
End: 2024-12-18
Payer: COMMERCIAL

## 2024-12-18 NOTE — TELEPHONE ENCOUNTER
Patient Quality Outreach    Patient is due for the following:   Hypertension -  Hypertension follow-up visit    Action(s) Taken:   Patient has upcoming appointment, these items will be addressed at that time.    Type of outreach:    Chart review performed, no outreach needed.    Questions for provider review:    None           Lydia Betts MA

## 2024-12-19 ENCOUNTER — DOCUMENTATION ONLY (OUTPATIENT)
Dept: GASTROENTEROLOGY | Facility: CLINIC | Age: 59
End: 2024-12-19
Payer: COMMERCIAL

## 2024-12-19 DIAGNOSIS — Z94.4 LIVER TRANSPLANTED (H): Primary | ICD-10-CM

## 2024-12-23 ENCOUNTER — OFFICE VISIT (OUTPATIENT)
Dept: GASTROENTEROLOGY | Facility: CLINIC | Age: 59
End: 2024-12-23
Attending: INTERNAL MEDICINE
Payer: COMMERCIAL

## 2024-12-23 ENCOUNTER — LAB (OUTPATIENT)
Dept: LAB | Facility: CLINIC | Age: 59
End: 2024-12-23
Attending: INTERNAL MEDICINE
Payer: COMMERCIAL

## 2024-12-23 VITALS
BODY MASS INDEX: 27.05 KG/M2 | RESPIRATION RATE: 18 BRPM | HEIGHT: 66 IN | OXYGEN SATURATION: 100 % | HEART RATE: 74 BPM | WEIGHT: 168.3 LBS | TEMPERATURE: 98.1 F | DIASTOLIC BLOOD PRESSURE: 86 MMHG | SYSTOLIC BLOOD PRESSURE: 138 MMHG

## 2024-12-23 DIAGNOSIS — D84.9 IMMUNOSUPPRESSED STATUS (H): ICD-10-CM

## 2024-12-23 DIAGNOSIS — Z94.4 LIVER TRANSPLANTED (H): ICD-10-CM

## 2024-12-23 DIAGNOSIS — Z94.4 S/P LIVER TRANSPLANT (H): ICD-10-CM

## 2024-12-23 DIAGNOSIS — K51.019 ULCERATIVE PANCOLITIS WITH COMPLICATION (H): Primary | ICD-10-CM

## 2024-12-23 LAB
ALBUMIN SERPL BCG-MCNC: 4.3 G/DL (ref 3.5–5.2)
ALP SERPL-CCNC: 56 U/L (ref 40–150)
ALT SERPL W P-5'-P-CCNC: 12 U/L (ref 0–50)
ANION GAP SERPL CALCULATED.3IONS-SCNC: 11 MMOL/L (ref 7–15)
AST SERPL W P-5'-P-CCNC: 15 U/L (ref 0–45)
BILIRUB DIRECT SERPL-MCNC: <0.2 MG/DL (ref 0–0.3)
BILIRUB SERPL-MCNC: 0.4 MG/DL
BUN SERPL-MCNC: 15.8 MG/DL (ref 8–23)
CALCIUM SERPL-MCNC: 9.6 MG/DL (ref 8.8–10.4)
CHLORIDE SERPL-SCNC: 105 MMOL/L (ref 98–107)
CREAT SERPL-MCNC: 0.79 MG/DL (ref 0.51–0.95)
EGFRCR SERPLBLD CKD-EPI 2021: 85 ML/MIN/1.73M2
ERYTHROCYTE [DISTWIDTH] IN BLOOD BY AUTOMATED COUNT: 13.8 % (ref 10–15)
GLUCOSE SERPL-MCNC: 93 MG/DL (ref 70–99)
HCO3 SERPL-SCNC: 24 MMOL/L (ref 22–29)
HCT VFR BLD AUTO: 37.4 % (ref 35–47)
HGB BLD-MCNC: 12.8 G/DL (ref 11.7–15.7)
MCH RBC QN AUTO: 28.3 PG (ref 26.5–33)
MCHC RBC AUTO-ENTMCNC: 34.2 G/DL (ref 31.5–36.5)
MCV RBC AUTO: 83 FL (ref 78–100)
PLATELET # BLD AUTO: 224 10E3/UL (ref 150–450)
POTASSIUM SERPL-SCNC: 4 MMOL/L (ref 3.4–5.3)
PROT SERPL-MCNC: 7.3 G/DL (ref 6.4–8.3)
RBC # BLD AUTO: 4.52 10E6/UL (ref 3.8–5.2)
SODIUM SERPL-SCNC: 140 MMOL/L (ref 135–145)
TACROLIMUS BLD-MCNC: 3.6 UG/L (ref 5–15)
TME LAST DOSE: ABNORMAL H
TME LAST DOSE: ABNORMAL H
WBC # BLD AUTO: 8.6 10E3/UL (ref 4–11)

## 2024-12-23 PROCEDURE — G2211 COMPLEX E/M VISIT ADD ON: HCPCS | Performed by: INTERNAL MEDICINE

## 2024-12-23 PROCEDURE — 99213 OFFICE O/P EST LOW 20 MIN: CPT | Performed by: INTERNAL MEDICINE

## 2024-12-23 PROCEDURE — 36415 COLL VENOUS BLD VENIPUNCTURE: CPT | Performed by: PATHOLOGY

## 2024-12-23 PROCEDURE — 85027 COMPLETE CBC AUTOMATED: CPT | Performed by: PATHOLOGY

## 2024-12-23 PROCEDURE — 80053 COMPREHEN METABOLIC PANEL: CPT | Performed by: PATHOLOGY

## 2024-12-23 PROCEDURE — 80197 ASSAY OF TACROLIMUS: CPT | Performed by: INTERNAL MEDICINE

## 2024-12-23 PROCEDURE — 99000 SPECIMEN HANDLING OFFICE-LAB: CPT | Performed by: PATHOLOGY

## 2024-12-23 PROCEDURE — 99214 OFFICE O/P EST MOD 30 MIN: CPT | Performed by: INTERNAL MEDICINE

## 2024-12-23 PROCEDURE — 82248 BILIRUBIN DIRECT: CPT | Performed by: PATHOLOGY

## 2024-12-23 ASSESSMENT — PAIN SCALES - GENERAL: PAINLEVEL_OUTOF10: NO PAIN (0)

## 2024-12-23 NOTE — PROGRESS NOTES
Date of Encounter: 2024     Subjective:          Berta Nava is a 59 year old female presenting for follow up with history of liver transplantation    History of Present Illness   Berta Nava is a 59 year old female with past medical history of hypertension, microscopic colitis, ulcerative colitis, pancreatic mucinous cystadenoma, primary sclerosing cholangitis who presents in follow up s/p  donor liver transplant on 2021.  Also with a history of tissue-invasive CMV now presenting in follow up.    Since last seen a year ago she reports that she is doing very well.  She feels her energy levels are back to normal, has a good appetite, and is trying to stay physically active.  She notes much of the past year has been in the caregiver role: Her  was diagnosed with lymphoma, recently diagnosed in clinical remission, but she notes that it is taken a toll on him.  She notes that he has limited energy and appetite and that she is continuing to work with him to increase these.    Noted that she underwent abdominal wall herniorrhaphy with Dr. Schmitt on .  The for several days she did have significant pain but this is resolved and she feels near back to baseline.    She denies any recent hospitalizations or trips to the ED    She reports that her last colonoscopy was in October and she is scheduled for her next one in 2025.    She presents today on twice daily tacrolimus with a goal trough level of around 3-5, and her levels have been consistently around 3 which is perfect     She did undergo DEXA scan in May 2023 which demonstrated mildly reduced bone density but no real overt osteopenia nor osteoporosis.  So awesome known to talk to me    Surgical Course  - OLT date: 2021  - etiology: PSC  - donor: type DBD  - CMV status D positive/R negative  - operation: Surgical technique caval replacement, biliary anastomosis: Duct to duct  - CiT 6 hrs  "22 min, WiT 51 min  - Episodes of Rejection: 5/22: 2-3/9 ACR  - Biliary complications: none; stent removed 2/9/2022  - Other complications of immediate post-operative course: tissue invasive CMV 6/2022    PMHx, Past Surg Hx, Fam Hx, Social Hx reviewed    Blood pressure 138/86, pulse 74, temperature 98.1  F (36.7  C), temperature source Oral, resp. rate 18, height 1.676 m (5' 5.98\"), weight 76.3 kg (168 lb 4.8 oz), SpO2 100%, not currently breastfeeding.  Gen: NAD  HEENT: anicteric  Resp: Normal respiratory excursion   Abd: soft, non-tender  Ext: no edema    Labs and Diagnostic tests:  CBC w/Diff    Lab Results   Component Value Date/Time    WBC 8.6 12/23/2024 09:27 AM    WBC 15.2 (H) 06/25/2021 09:09 AM    RBC 4.52 12/23/2024 09:27 AM    RBC 3.57 (L) 06/25/2021 09:09 AM    HGB 12.8 12/23/2024 09:27 AM    HGB 10.7 (L) 06/25/2021 09:09 AM    HCT 37.4 12/23/2024 09:27 AM    HCT 32.9 (L) 06/25/2021 09:09 AM    MCV 83 12/23/2024 09:27 AM    MCV 92 06/25/2021 09:09 AM    MCH 28.3 12/23/2024 09:27 AM    MCH 30.0 06/25/2021 09:09 AM    MCHC 34.2 12/23/2024 09:27 AM    MCHC 32.5 06/25/2021 09:09 AM    RDW 13.8 12/23/2024 09:27 AM    RDW 16.4 (H) 06/25/2021 09:09 AM         Comprehensive Metabolic Profile    Lab Results   Component Value Date/Time     12/23/2024 09:27 AM     (L) 06/25/2021 09:09 AM    CO2 24 12/23/2024 09:27 AM    CO2 24 11/09/2022 07:40 AM    CO2 21 06/25/2021 09:09 AM    BUN 15.8 12/23/2024 09:27 AM    BUN 18 11/09/2022 07:40 AM    BUN 23 06/25/2021 09:09 AM    CR 0.79 12/23/2024 09:27 AM    CR 0.69 06/25/2021 09:09 AM    Lab Results   Component Value Date/Time    ALBUMIN 4.3 12/23/2024 09:27 AM    ALBUMIN 3.8 11/09/2022 07:40 AM    ALBUMIN 2.8 (L) 06/25/2021 09:09 AM    ALKPHOS 56 12/23/2024 09:27 AM    ALKPHOS 386 (H) 06/25/2021 09:09 AM    AST 15 12/23/2024 09:27 AM    AST 58 (H) 06/25/2021 09:09 AM    ALT 12 12/23/2024 09:27 AM    ALT 32 06/25/2021 09:09 AM          Assessment and " Plan:    S/P Liver Transplantation:   -She is 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  - Excellent allograft function at this time  - Liver biopsy 2022 with ACR 2-3/9, however, she did have CMV viremia at that time and resolved with anti-viral therapy  -We will continue with labs per protocol; every 3 month    Immunosuppression:   - On TAC BID with goal trough 3-5  - Will plan to check immunosuppression trough levels per protocol to assess for adequate target dosing and will assess CBC and kidney function for toxicity of medications    Kidney Health:   -No acute issues at this time    Hypertension:  -She is currently on lisinopril as an antihypertensive, we recommended she continue to follow with her primary for aggressive management    CMV: tissue invasive  -Treated in   - not currently on treatment    Pan ulcerative colitis:  - Has established care with Dr. JEANETH Crawford and continues in follow up  - Colonoscopy scheduled 2025    Nutrition/Weight:    It is very common for patients to put on significant weight after liver transplantation, as they become conditioned to eating as much as possible to maintain a healthy weight pre-transplant.  There is a very significant risk of developing fatty liver and non-alcoholic steatohepatitis in post-transplant patients, even in those who were not transplanted for ALVES cirrhosis.  - Please work on eating a diet that is rice in fresh fruits and vegetables, moderate amounts of lean protein and dairy, and modest amounts of carbohydrates and fats.  - An exercise plan/regimen is an essential part of remaining healthy in the post-transplant setting and we recommend 30-35 minutes of exercise at least 4 days a week    Routine Health Care:  - You need to establish and maintain care with a primary care physician to manage: hypertension, high cholesterol, abnormal blood sugars - as these are all potential complications of your  health after liver transplantation and will need a local physician to manage these issues.  - All patients with liver diseases (even post transplant) are at an increased risk for osteoporosis.  We strongly recommend screening for Vitamin D deficiency at least twice yearly with aggressive supplementation/replacement as indicated.    - We recommend a screening DEXA scan to evaluate for osteoporosis.  If present, should treat with calcium, Vitamin D supplementation, and recommend consideration of bisphosphonate therapy.  Also recommend follow up DEXA scans to evaluate for improvement of bone density on therapy.  - Recommend yearly skin exams with dermatology, and if skin cancers are found, recommend at least twice yearly exams  - Recommend post transplant patients stay up to date for cancer screening: mammograms/PAP for women and prostate cancer screening for men, and colon cancer screening for all.  - Practice good hygiene with washing of hands and maintaining a clean living space as this will decrease your chances of developing an infection in the post-transplantation setting  - Eat a health diet: rich in fresh fruits and vegetables, lean proteins, and minimize carbohydrate and fat intake.  - Remain physically active: this is key to keeping the liver transplant healthy and patient's feeling strong and healthy    Follow Up:  1 year      Thank you very much for the opportunity to participate in the care of this patient.  If you have any further questions, please don't hesitate to contact our office.    __________________________________  Thomas M. Leventhal, M.D.   of Medicine  Advanced & Transplant Hepatology  [facility]     The longitudinal plan of care for liver transplantation (and possible complications) was addressed during this visit. Due to the added complexity in care, I will continue to support this patient in the subsequent management of this condition(s) and  with the ongoing continuity of care of this condition

## 2024-12-23 NOTE — LETTER
2024      Berta Nava  [address]  [facility] [address]      Dear Colleague,    Thank you for referring your patient, Berta Nava, to the [facility] [place]. Please see a copy of my visit note below.    Date of Encounter: 2024     Subjective:          Berta Nava is a 59 year old female presenting for follow up with history of liver transplantation    History of Present Illness   Berta Nava is a 59 year old female with past medical history of hypertension, microscopic colitis, ulcerative colitis, pancreatic mucinous cystadenoma, primary sclerosing cholangitis who presents in follow up s/p  donor liver transplant on 2021.  Also with a history of tissue-invasive CMV now presenting in follow up.    Since last seen a year ago she reports that she is doing very well.  She feels her energy levels are back to normal, has a good appetite, and is trying to stay physically active.  She notes much of the past year has been in the caregiver role: Her  was diagnosed with lymphoma, recently diagnosed in clinical remission, but she notes that it is taken a toll on him.  She notes that he has limited energy and appetite and that she is continuing to work with him to increase these.    Noted that she underwent abdominal wall herniorrhaphy with Dr. Schmitt on .  The for several days she did have significant pain but this is resolved and she feels near back to baseline.    She denies any recent hospitalizations or trips to the ED    She reports that her last colonoscopy was in October and she is scheduled for her next one in 2025.    She presents today on twice daily tacrolimus with a goal trough level of around 3-5, and her levels have been consistently around 3 which is perfect     She did undergo DEXA scan in May 2023 which demonstrated mildly reduced bone density but no real overt osteopenia nor  "osteoporosis.  So awesome known to talk to me    Surgical Course  - OLT date: November 18, 2021  - etiology: PSC  - donor: type DBD  - CMV status D positive/R negative  - operation: Surgical technique caval replacement, biliary anastomosis: Duct to duct  - CiT 6 hrs 22 min, WiT 51 min  - Episodes of Rejection: 5/22: 2-3/9 ACR  - Biliary complications: none; stent removed 2/9/2022  - Other complications of immediate post-operative course: tissue invasive CMV 6/2022    PMHx, Past Surg Hx, Fam Hx, Social Hx reviewed    Blood pressure 138/86, pulse 74, temperature 98.1  F (36.7  C), temperature source Oral, resp. rate 18, height 1.676 m (5' 5.98\"), weight 76.3 kg (168 lb 4.8 oz), SpO2 100%, not currently breastfeeding.  Gen: NAD  HEENT: anicteric  Resp: Normal respiratory excursion   Abd: soft, non-tender  Ext: no edema    Labs and Diagnostic tests:  CBC w/Diff    Lab Results   Component Value Date/Time    WBC 8.6 12/23/2024 09:27 AM    WBC 15.2 (H) 06/25/2021 09:09 AM    RBC 4.52 12/23/2024 09:27 AM    RBC 3.57 (L) 06/25/2021 09:09 AM    HGB 12.8 12/23/2024 09:27 AM    HGB 10.7 (L) 06/25/2021 09:09 AM    HCT 37.4 12/23/2024 09:27 AM    HCT 32.9 (L) 06/25/2021 09:09 AM    MCV 83 12/23/2024 09:27 AM    MCV 92 06/25/2021 09:09 AM    MCH 28.3 12/23/2024 09:27 AM    MCH 30.0 06/25/2021 09:09 AM    MCHC 34.2 12/23/2024 09:27 AM    MCHC 32.5 06/25/2021 09:09 AM    RDW 13.8 12/23/2024 09:27 AM    RDW 16.4 (H) 06/25/2021 09:09 AM         Comprehensive Metabolic Profile    Lab Results   Component Value Date/Time     12/23/2024 09:27 AM     (L) 06/25/2021 09:09 AM    CO2 24 12/23/2024 09:27 AM    CO2 24 11/09/2022 07:40 AM    CO2 21 06/25/2021 09:09 AM    BUN 15.8 12/23/2024 09:27 AM    BUN 18 11/09/2022 07:40 AM    BUN 23 06/25/2021 09:09 AM    CR 0.79 12/23/2024 09:27 AM    CR 0.69 06/25/2021 09:09 AM    Lab Results   Component Value Date/Time    ALBUMIN 4.3 12/23/2024 09:27 AM    ALBUMIN 3.8 11/09/2022 07:40 AM    " ALBUMIN 2.8 (L) 2021 09:09 AM    ALKPHOS 56 2024 09:27 AM    ALKPHOS 386 (H) 2021 09:09 AM    AST 15 2024 09:27 AM    AST 58 (H) 2021 09:09 AM    ALT 12 2024 09:27 AM    ALT 32 2021 09:09 AM          Assessment and Plan:    S/P Liver Transplantation:   -She is 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  - Excellent allograft function at this time  - Liver biopsy 2022 with ACR 2-3/9, however, she did have CMV viremia at that time and resolved with anti-viral therapy  -We will continue with labs per protocol; every 3 month    Immunosuppression:   - On TAC BID with goal trough 3-5  - Will plan to check immunosuppression trough levels per protocol to assess for adequate target dosing and will assess CBC and kidney function for toxicity of medications    Kidney Health:   -No acute issues at this time    Hypertension:  -She is currently on lisinopril as an antihypertensive, we recommended she continue to follow with her primary for aggressive management    CMV: tissue invasive  -Treated in   - not currently on treatment    Pan ulcerative colitis:  - Has established care with Dr. JEANETH Crawford and continues in follow up  - Colonoscopy scheduled 2025    Nutrition/Weight:    It is very common for patients to put on significant weight after liver transplantation, as they become conditioned to eating as much as possible to maintain a healthy weight pre-transplant.  There is a very significant risk of developing fatty liver and non-alcoholic steatohepatitis in post-transplant patients, even in those who were not transplanted for ALVES cirrhosis.  - Please work on eating a diet that is rice in fresh fruits and vegetables, moderate amounts of lean protein and dairy, and modest amounts of carbohydrates and fats.  - An exercise plan/regimen is an essential part of remaining healthy in the post-transplant setting and we recommend 30-35  minutes of exercise at least 4 days a week    Routine Health Care:  - You need to establish and maintain care with a primary care physician to manage: hypertension, high cholesterol, abnormal blood sugars - as these are all potential complications of your health after liver transplantation and will need a local physician to manage these issues.  - All patients with liver diseases (even post transplant) are at an increased risk for osteoporosis.  We strongly recommend screening for Vitamin D deficiency at least twice yearly with aggressive supplementation/replacement as indicated.    - We recommend a screening DEXA scan to evaluate for osteoporosis.  If present, should treat with calcium, Vitamin D supplementation, and recommend consideration of bisphosphonate therapy.  Also recommend follow up DEXA scans to evaluate for improvement of bone density on therapy.  - Recommend yearly skin exams with dermatology, and if skin cancers are found, recommend at least twice yearly exams  - Recommend post transplant patients stay up to date for cancer screening: mammograms/PAP for women and prostate cancer screening for men, and colon cancer screening for all.  - Practice good hygiene with washing of hands and maintaining a clean living space as this will decrease your chances of developing an infection in the post-transplantation setting  - Eat a health diet: rich in fresh fruits and vegetables, lean proteins, and minimize carbohydrate and fat intake.  - Remain physically active: this is key to keeping the liver transplant healthy and patient's feeling strong and healthy    Follow Up:  1 year      Thank you very much for the opportunity to participate in the care of this patient.  If you have any further questions, please don't hesitate to contact our office.    __________________________________  Thomas M. Leventhal, M.D.   of Medicine  Advanced & Transplant Hepatology  [facility]     The longitudinal plan of care for liver transplantation (and possible complications) was addressed during this visit. Due to the added complexity in care, I will continue to support this patient in the subsequent management of this condition(s) and with the ongoing continuity of care of this condition      Again, thank you for allowing me to participate in the care of your patient.        Sincerely,        Thomas M. Leventhal, MD    Electronically signed

## 2024-12-23 NOTE — NURSING NOTE
"Chief Complaint   Patient presents with    RECHECK     post liver transplant 11/18/2021     Vital signs:  Temp: 98.1  F (36.7  C) Temp src: Oral BP: 138/86 Pulse: 74   Resp: 18 SpO2: 100 %     Height: 167.6 cm (5' 5.98\") Weight: 76.3 kg (168 lb 4.8 oz)  Estimated body mass index is 27.18 kg/m  as calculated from the following:    Height as of this encounter: 1.676 m (5' 5.98\").    Weight as of this encounter: 76.3 kg (168 lb 4.8 oz).      Reina Mcduffie, [facility]  12/23/2024 10:36 AM    "

## 2025-01-13 NOTE — ANESTHESIA CARE TRANSFER NOTE
Detail Level: Simple Patient: Berta Nava    Procedure(s):  ENDOSCOPIC RETROGRADE CHOLANGIOPANCREATOGRAPHY, WITH biliary stent exchange, stone removal    Diagnosis: Cholangitis [K83.09]  Diagnosis Additional Information: No value filed.    Anesthesia Type:   General     Note:    Oropharynx: oropharynx clear of all foreign objects and spontaneously breathing  Level of Consciousness: drowsy  Oxygen Supplementation: room air    Independent Airway: airway patency satisfactory and stable  Dentition: dentition unchanged  Vital Signs Stable: post-procedure vital signs reviewed and stable  Report to RN Given: handoff report given  Patient transferred to: PACU    Handoff Report: Identifed the Patient, Identified the Reponsible Provider, Reviewed the pertinent medical history, Discussed the surgical course, Reviewed Intra-OP anesthesia mangement and issues during anesthesia, Set expectations for post-procedure period and Allowed opportunity for questions and acknowledgement of understanding      Vitals: (Last set prior to Anesthesia Care Transfer)  CRNA VITALS  6/10/2021 1511 - 6/10/2021 1602      6/10/2021             NIBP:  92/62    Ht Rate:  64    SpO2:  100 %        Electronically Signed By: CHELSEY Lazar CRNA  Alayna 10, 2021  4:02 PM

## 2025-01-21 ENCOUNTER — DOCUMENTATION ONLY (OUTPATIENT)
Dept: TRANSPLANT | Facility: CLINIC | Age: 60
End: 2025-01-21
Payer: COMMERCIAL

## 2025-01-21 ASSESSMENT — ENCOUNTER SYMPTOMS: NEW SYMPTOMS OF CORONARY ARTERY DISEASE: 0

## 2025-01-25 ENCOUNTER — ANESTHESIA EVENT (OUTPATIENT)
Dept: SURGERY | Facility: AMBULATORY SURGERY CENTER | Age: 60
End: 2025-01-25
Payer: COMMERCIAL

## 2025-01-25 NOTE — ANESTHESIA PREPROCEDURE EVALUATION
Anesthesia Pre-Procedure Evaluation    Patient: Berta Nava   MRN: [id number] : 1965        Procedure : Procedure(s):  Colonoscopy          Past Medical History:   Diagnosis Date    Ascites     Biliary cirrhosis (H)     Cholangitis, sclerosing (H)     Cirrhosis of liver with ascites (H) 2021    Hearing loss of left ear     wears a hearing aide    History of blood transfusion     History of low potassium     Hyperlipidemia     Hypertension     Liver transplant rejection (H) 2022    Mild Rejection    SBP (spontaneous bacterial peritonitis) (H) 2021    Sjogren's syndrome     Ulcerative colitis (H)     Ulcerative pancolitis (H)       Past Surgical History:   Procedure Laterality Date    BENCH LIVER N/A 2021    Procedure: Bench liver;  Surgeon: Ernesto Schmitt MD;  Location:  OR    CATARACT IOL, RT/LT      COLONOSCOPY N/A 2022    Procedure: COLONOSCOPY, WITH BIOPSY;  Surgeon: Leventhal, Thomas Michael, MD;  [place] [facility] [place]    COLONOSCOPY N/A 10/31/2022    Procedure: COLONOSCOPY, WITH POLYPECTOMY AND BIOPSY;  Surgeon: Kendrick Crawford MD;  Location: [facility] OR    COLONOSCOPY N/A 10/31/2023    Procedure: Colonoscopy with biopsy and polypectomy;  Surgeon: Kendrick Crawford MD;  Location: [facility] [place]    COMBINED ESOPHAGOSCOPY, GASTROSCOPY, DUODENOSCOPY (EGD), REMOVE BILIARY STENT N/A 2022    Procedure: ESOPHAGOGASTRODUODENOSCOPY, WITH BILIARY STENT REMOVAL;  Surgeon: Leventhal, Thomas Michael, MD;  Location: [facility] [place]    CV CORONARY ANGIOGRAM N/A 2021    Procedure: Coronary Angiogram with possible intervention;  Surgeon: David Wilhelm MD;  Location:  HEART CARDIAC CATH LAB    ENDOSCOPIC RETROGRADE CHOLANGIOPANCREATOGRAM N/A 2021    Procedure: ENDOSCOPIC RETROGRADE CHOLANGIOPANCREATOGRAPHY with ballon sweep of bile ducts for stones, balloon dilation of bile ducts and bile duct stent placement;  Surgeon: Gregory Gabriel MD;  Location:  OR     ENDOSCOPIC RETROGRADE CHOLANGIOPANCREATOGRAM N/A 07/22/2021    Procedure: ENDOSCOPIC RETROGRADE CHOLANGIOPANCREATOGRAPHY STONE REMOVAL, DILATION AND STENT PLACEMENT;  Surgeon: Gregory Gabriel MD;  Location: [facility] OR    ENDOSCOPIC RETROGRADE CHOLANGIOPANCREATOGRAPHY, EXCHANGE TUBE/STENT N/A 05/05/2021    Procedure: ENDOSCOPIC RETROGRADE CHOLANGIOPANCREATOGRAPHY WITH STENT EXCHANGE, STONE EXTRACTION, AND DILATION;  Surgeon: Gregory Gabriel MD;  Location: [facility] OR    ENDOSCOPIC RETROGRADE CHOLANGIOPANCREATOGRAPHY, EXCHANGE TUBE/STENT N/A 05/10/2021    Procedure: ENDOSCOPIC RETROGRADE CHOLANGIOPANCREATOGRAPHY with biliary dilation, stone removal, stent exchange;  Surgeon: Gregory Gabriel MD;  Location: [facility] OR    ENDOSCOPIC RETROGRADE CHOLANGIOPANCREATOGRAPHY, EXCHANGE TUBE/STENT N/A 06/10/2021    Procedure: ENDOSCOPIC RETROGRADE CHOLANGIOPANCREATOGRAPHY, WITH biliary stent exchange, stone removal;  Surgeon: Woodrow Lott MD;  Location: [facility] OR    ENDOSCOPIC RETROGRADE CHOLANGIOPANCREATOGRAPHY, EXCHANGE TUBE/STENT N/A 08/30/2021    Procedure: ENDOSCOPIC RETROGRADE CHOLANGIOPANCREATOGRAPHY with biliary dilation, stone removal, stent exchange;  Surgeon: Gregory Gabriel MD;  Location: [facility] OR    ENDOSCOPIC RETROGRADE CHOLANGIOPANCREATOGRAPHY, EXCHANGE TUBE/STENT N/A 10/01/2021    Procedure: ENDOSCOPIC RETROGRADE CHOLANGIOPANCREATOGRAPHY with balloon sweep of bile ducts, bile duct stent exchanged;  Surgeon: Gregory Gabriel MD;  Location: [facility] OR    ESOPHAGOSCOPY, GASTROSCOPY, DUODENOSCOPY (EGD), COMBINED N/A 09/04/2021    Procedure: ESOPHAGOGASTRODUODENOSCOPY (EGD);  Surgeon: Leventhal, Thomas Michael, MD;  Location: [facility]    ESOPHAGOSCOPY, GASTROSCOPY, DUODENOSCOPY (EGD), COMBINED N/A 10/01/2021    Procedure: ESOPHAGOGASTRODUODENOSCOPY (EGD) with varices banding;  Surgeon: Gregory Gabriel MD;  Location: [facility] OR    GYN SURGERY      bilat fallopian tubes and ovaries removed    HERNIORRHAPHY INCISIONAL (LOCATION) Left 11/6/2024     Procedure: open incisional hernia repair WITH MESH;  Surgeon: Ernesto Schmitt MD;  Location: [facility] OR    IR ERCP  2020    IR ERCP  2021    IR LIVER BIOPSY PERCUTANEOUS  2022    PHACOEMULSIFICATION CLEAR CORNEA WITH TORIC INTRAOCULAR LENS IMPLANT Right 2024    Procedure: RIGHT EYE PHACOEMULSIFICATION, CATARACT, WITH INTRAOCULAR LENS IMPLANT, TORIC LENS;  Surgeon: Dory Palmer MD;  Location: [facility] OR    PICC DOUBLE LUMEN PLACEMENT Right 2021    42cm (2cm external), Lateral brachial vein    RETURN LIVER TRANSPLANT N/A 2021    Procedure: CLOSURE, WOUND, ABDOMEN, SECONDARY, ABDOMINAL WASHOUT;  Surgeon: Ernesto Schmitt MD;  Location: [facility] OR    TRANSPLANT  2021    Liver due to PSC    TRANSPLANT LIVER RECIPIENT  DONOR N/A 2021    Procedure: Opening of abdomen, Abdominal Exploration and aborted liver transplant.;  Surgeon: Ernesto Schmitt MD;  Location: [facility] OR    TRANSPLANT LIVER RECIPIENT  DONOR N/A 2021    Procedure: TRANSPLANT, LIVER, RECIPIENT,  DONOR;  Surgeon: Ernesto Schmitt MD;  Location: [facility] OR      Allergies   Allergen Reactions    Iodinated Contrast Media Hives     PATIENT HAD HIVE REACTION AFTER ADMINISTERING CT CONTRAST DYE.      Contrast Dye       Social History     Tobacco Use    Smoking status: Never     Passive exposure: Never    Smokeless tobacco: Never   Substance Use Topics    Alcohol use: No     Comment: Last drink was       Wt Readings from Last 1 Encounters:   24 76.3 kg (168 lb 4.8 oz)           Physical Exam    Airway        Mallampati: I   TM distance: > 3 FB   Neck ROM: full   Mouth opening: > 3 cm    Respiratory Devices and Support         Dental       (+) Modest Abnormalities - crowns, retainers, 1 or 2 missing teeth      Cardiovascular   cardiovascular exam normal          Pulmonary   pulmonary exam normal                OUTSIDE LABS:  CBC:   Lab Results   Component Value Date    WBC 8.6  12/23/2024    WBC 7.8 11/06/2024    HGB 12.8 12/23/2024    HGB 13.6 11/06/2024    HCT 37.4 12/23/2024    HCT 39.1 11/06/2024     12/23/2024     11/06/2024     BMP:   Lab Results   Component Value Date     12/23/2024     11/06/2024    POTASSIUM 4.0 12/23/2024    POTASSIUM 4.3 11/06/2024    CHLORIDE 105 12/23/2024    CHLORIDE 106 11/06/2024    CO2 24 12/23/2024    CO2 18 (L) 11/06/2024    BUN 15.8 12/23/2024    BUN 18.7 11/06/2024    CR 0.79 12/23/2024    CR 0.83 11/06/2024    GLC 93 12/23/2024     (H) 11/07/2024     COAGS:   Lab Results   Component Value Date    PTT 36 12/03/2021    INR 1.04 10/24/2024    FIBR 465 11/22/2021     POC:   Lab Results   Component Value Date    BGM 95 06/25/2021     HEPATIC:   Lab Results   Component Value Date    ALBUMIN 4.3 12/23/2024    PROTTOTAL 7.3 12/23/2024    ALT 12 12/23/2024    AST 15 12/23/2024    ALKPHOS 56 12/23/2024    BILITOTAL 0.4 12/23/2024    MIHIR 26 11/19/2021     OTHER:   Lab Results   Component Value Date    PH 7.48 (H) 11/20/2021    LACT 0.6 (L) 12/03/2021    A1C 4.6 03/22/2023    MARIELENA 9.6 12/23/2024    PHOS 3.6 09/06/2024    MAG 1.8 09/06/2024    LIPASE 155 12/03/2021    AMYLASE 36 11/19/2021    CRP 54.0 (H) 11/17/2021    SED 20 04/10/2023       Anesthesia Plan    ASA Status:  3    NPO Status:  NPO Appropriate    Anesthesia Type: MAC.     - Reason for MAC: straight local not clinically adequate   Induction: Intravenous, Propofol.   Maintenance: TIVA.        Consents    Anesthesia Plan(s) and associated risks, benefits, and realistic alternatives discussed. Questions answered and patient/representative(s) expressed understanding.     - Discussed: Risks, Benefits and Alternatives for BOTH SEDATION and the PROCEDURE were discussed     - Discussed with:  Patient      - Extended Intubation/Ventilatory Support Discussed: No.      - Patient is DNR/DNI Status: No     Use of blood products discussed: No .     Postoperative Care       PONV  prophylaxis: Ondansetron (or other 5HT-3), Background Propofol Infusion     Comments:               Ruben Hagen MD    I have reviewed the pertinent notes and labs in the chart from the past 30 days and (re)examined the patient.  Any updates or changes from those notes are reflected in this note.    Clinically Significant Risk Factors Present on Admission                   # Hypertension: Noted on problem list

## 2025-01-27 ENCOUNTER — ANESTHESIA (OUTPATIENT)
Dept: SURGERY | Facility: AMBULATORY SURGERY CENTER | Age: 60
End: 2025-01-27
Payer: COMMERCIAL

## 2025-01-27 ENCOUNTER — HOSPITAL ENCOUNTER (OUTPATIENT)
Facility: AMBULATORY SURGERY CENTER | Age: 60
Discharge: HOME | End: 2025-01-27
Attending: INTERNAL MEDICINE | Admitting: INTERNAL MEDICINE
Payer: COMMERCIAL

## 2025-01-27 VITALS
RESPIRATION RATE: 16 BRPM | DIASTOLIC BLOOD PRESSURE: 74 MMHG | OXYGEN SATURATION: 99 % | HEIGHT: 65 IN | BODY MASS INDEX: 26.66 KG/M2 | TEMPERATURE: 97.3 F | WEIGHT: 160 LBS | SYSTOLIC BLOOD PRESSURE: 110 MMHG | HEART RATE: 74 BPM

## 2025-01-27 VITALS — HEART RATE: 71 BPM

## 2025-01-27 LAB — COLONOSCOPY: NORMAL

## 2025-01-27 PROCEDURE — 88305 TISSUE EXAM BY PATHOLOGIST: CPT | Mod: 26 | Performed by: PATHOLOGY

## 2025-01-27 PROCEDURE — 45380 COLONOSCOPY AND BIOPSY: CPT | Mod: 59,33 | Performed by: INTERNAL MEDICINE

## 2025-01-27 PROCEDURE — 45385 COLONOSCOPY W/LESION REMOVAL: CPT | Mod: 33 | Performed by: INTERNAL MEDICINE

## 2025-01-27 PROCEDURE — 88305 TISSUE EXAM BY PATHOLOGIST: CPT | Mod: TC | Performed by: INTERNAL MEDICINE

## 2025-01-27 RX ORDER — ONDANSETRON 2 MG/ML
4 INJECTION INTRAMUSCULAR; INTRAVENOUS EVERY 6 HOURS PRN
Status: DISCONTINUED | OUTPATIENT
Start: 2025-01-27 | End: 2025-01-28 | Stop reason: HOSPADM

## 2025-01-27 RX ORDER — ONDANSETRON 2 MG/ML
4 INJECTION INTRAMUSCULAR; INTRAVENOUS
Status: DISCONTINUED | OUTPATIENT
Start: 2025-01-27 | End: 2025-01-27 | Stop reason: HOSPADM

## 2025-01-27 RX ORDER — NALOXONE HYDROCHLORIDE 0.4 MG/ML
0.2 INJECTION, SOLUTION INTRAMUSCULAR; INTRAVENOUS; SUBCUTANEOUS
Status: DISCONTINUED | OUTPATIENT
Start: 2025-01-27 | End: 2025-01-28 | Stop reason: HOSPADM

## 2025-01-27 RX ORDER — FLUMAZENIL 0.1 MG/ML
0.2 INJECTION, SOLUTION INTRAVENOUS
Status: ACTIVE | OUTPATIENT
Start: 2025-01-27 | End: 2025-01-27

## 2025-01-27 RX ORDER — PROPOFOL 10 MG/ML
INJECTION, EMULSION INTRAVENOUS CONTINUOUS PRN
Status: DISCONTINUED | OUTPATIENT
Start: 2025-01-27 | End: 2025-01-27

## 2025-01-27 RX ORDER — LIDOCAINE HYDROCHLORIDE 20 MG/ML
INJECTION, SOLUTION INFILTRATION; PERINEURAL PRN
Status: DISCONTINUED | OUTPATIENT
Start: 2025-01-27 | End: 2025-01-27

## 2025-01-27 RX ORDER — PROPOFOL 10 MG/ML
INJECTION, EMULSION INTRAVENOUS PRN
Status: DISCONTINUED | OUTPATIENT
Start: 2025-01-27 | End: 2025-01-27

## 2025-01-27 RX ORDER — ONDANSETRON 4 MG/1
4 TABLET, ORALLY DISINTEGRATING ORAL EVERY 6 HOURS PRN
Status: DISCONTINUED | OUTPATIENT
Start: 2025-01-27 | End: 2025-01-28 | Stop reason: HOSPADM

## 2025-01-27 RX ORDER — SODIUM CHLORIDE, SODIUM LACTATE, POTASSIUM CHLORIDE, CALCIUM CHLORIDE 600; 310; 30; 20 MG/100ML; MG/100ML; MG/100ML; MG/100ML
INJECTION, SOLUTION INTRAVENOUS CONTINUOUS
Status: DISCONTINUED | OUTPATIENT
Start: 2025-01-27 | End: 2025-01-28 | Stop reason: HOSPADM

## 2025-01-27 RX ORDER — PROCHLORPERAZINE MALEATE 10 MG
10 TABLET ORAL EVERY 6 HOURS PRN
Status: DISCONTINUED | OUTPATIENT
Start: 2025-01-27 | End: 2025-01-28 | Stop reason: HOSPADM

## 2025-01-27 RX ORDER — NALOXONE HYDROCHLORIDE 0.4 MG/ML
0.4 INJECTION, SOLUTION INTRAMUSCULAR; INTRAVENOUS; SUBCUTANEOUS
Status: DISCONTINUED | OUTPATIENT
Start: 2025-01-27 | End: 2025-01-28 | Stop reason: HOSPADM

## 2025-01-27 RX ORDER — LIDOCAINE 40 MG/G
CREAM TOPICAL
Status: DISCONTINUED | OUTPATIENT
Start: 2025-01-27 | End: 2025-01-27 | Stop reason: HOSPADM

## 2025-01-27 RX ADMIN — PROPOFOL 150 MCG/KG/MIN: 10 INJECTION, EMULSION INTRAVENOUS at 10:13

## 2025-01-27 RX ADMIN — LIDOCAINE HYDROCHLORIDE 50 MG: 20 INJECTION, SOLUTION INFILTRATION; PERINEURAL at 10:13

## 2025-01-27 RX ADMIN — SODIUM CHLORIDE, SODIUM LACTATE, POTASSIUM CHLORIDE, CALCIUM CHLORIDE: 600; 310; 30; 20 INJECTION, SOLUTION INTRAVENOUS at 09:36

## 2025-01-27 RX ADMIN — Medication 100 MCG: at 10:41

## 2025-01-27 RX ADMIN — Medication 100 MCG: at 10:32

## 2025-01-27 RX ADMIN — PROPOFOL 100 MG: 10 INJECTION, EMULSION INTRAVENOUS at 10:13

## 2025-01-27 NOTE — ANESTHESIA CARE TRANSFER NOTE
Patient: Berta Nava    Procedure: Procedure(s):  COLONOSCOPY, WITH POLYPECTOMY AND BIOPSY       Diagnosis: Ulcerative pancolitis with complication (H) [K51.019]  PSC (primary sclerosing cholangitis) (H) [K83.01]  Diagnosis Additional Information: No value filed.    Anesthesia Type:   MAC     Note:    Oropharynx: oropharynx clear of all foreign objects and spontaneously breathing  Level of Consciousness: awake  Oxygen Supplementation: room air    Independent Airway: airway patency satisfactory and stable  Dentition: dentition unchanged  Vital Signs Stable: post-procedure vital signs reviewed and stable  Report to RN Given: handoff report given  Patient transferred to: Phase II  Comments: VSS, pt comfortable, denies PONV, report to Juventino WRIGHT  Handoff Report: Identifed the Patient, Identified the Reponsible Provider, Reviewed the pertinent medical history, Discussed the surgical course, Reviewed Intra-OP anesthesia mangement and issues during anesthesia, Set expectations for post-procedure period and Allowed opportunity for questions and acknowledgement of understanding      Vitals:  Vitals Value Taken Time   BP 74/59 01/27/25 1046   Temp     Pulse 80 01/27/25 1046   Resp     SpO2 99 % 01/27/25 1049   Vitals shown include unfiled device data.    Electronically Signed By: CHELSEY Nascimento CRNA  January 27, 2025  10:50 AM

## 2025-01-27 NOTE — H&P
Berta Nava  [id number]  female  59 year old      Reason for procedure/surgery: history of PSC/inflammatory bowel disease    Patient Active Problem List   Diagnosis    Hypokalemia    Essential hypertension    Hyperlipidemia    Mixed hearing loss, bilateral    Neoplasm of uncertain behavior of skin    Sicca syndrome    Ulcerative pancolitis with complication (H)    Primary sclerosing cholangitis (H)    Acute kidney failure, unspecified    Immunosuppressed status    S/P liver transplant (H)    Anemia due to chronic kidney disease    Liver transplant rejection (H)    Combined forms of age-related cataract of both eyes    S/P herniorrhaphy       Past Surgical History:    Past Surgical History:   Procedure Laterality Date    BENCH LIVER N/A 11/18/2021    Procedure: Bench liver;  Surgeon: Ernesto Schmitt MD;  Location: [facility]    CATARACT IOL, RT/LT      COLONOSCOPY N/A 06/08/2022    Procedure: COLONOSCOPY, WITH BIOPSY;  Surgeon: Leventhal, Thomas Michael, MD;  Location: [facility] OR    COLONOSCOPY N/A 10/31/2022    Procedure: COLONOSCOPY, WITH POLYPECTOMY AND BIOPSY;  Surgeon: Kendrick Crawford MD;  Location: [facility] OR    COLONOSCOPY N/A 10/31/2023    Procedure: Colonoscopy with biopsy and polypectomy;  Surgeon: Kendrick Crawford MD;  [place] [facility] [place]    COMBINED ESOPHAGOSCOPY, GASTROSCOPY, DUODENOSCOPY (EGD), REMOVE BILIARY STENT N/A 02/09/2022    Procedure: ESOPHAGOGASTRODUODENOSCOPY, WITH BILIARY STENT REMOVAL;  Surgeon: Leventhal, Thomas Michael, MD;  [place] [facility] [place]    CV CORONARY ANGIOGRAM N/A 08/25/2021    Procedure: Coronary Angiogram with possible intervention;  Surgeon: David Wilhelm MD;  Location:  [facility]    ENDOSCOPIC RETROGRADE CHOLANGIOPANCREATOGRAM N/A 06/25/2021    Procedure: ENDOSCOPIC RETROGRADE CHOLANGIOPANCREATOGRAPHY with ballon sweep of bile ducts for stones, balloon dilation of bile ducts and bile duct stent placement;  Surgeon: Gregory Gabriel MD;   Location: [facility]    ENDOSCOPIC RETROGRADE CHOLANGIOPANCREATOGRAM N/A 07/22/2021    Procedure: ENDOSCOPIC RETROGRADE CHOLANGIOPANCREATOGRAPHY STONE REMOVAL, DILATION AND STENT PLACEMENT;  Surgeon: Gregory Gabriel MD;  Location: [facility]    ENDOSCOPIC RETROGRADE CHOLANGIOPANCREATOGRAPHY, EXCHANGE TUBE/STENT N/A 05/05/2021    Procedure: ENDOSCOPIC RETROGRADE CHOLANGIOPANCREATOGRAPHY WITH STENT EXCHANGE, STONE EXTRACTION, AND DILATION;  Surgeon: Gregory Gabriel MD;  Location: [facility]    ENDOSCOPIC RETROGRADE CHOLANGIOPANCREATOGRAPHY, EXCHANGE TUBE/STENT N/A 05/10/2021    Procedure: ENDOSCOPIC RETROGRADE CHOLANGIOPANCREATOGRAPHY with biliary dilation, stone removal, stent exchange;  Surgeon: Gregory Gabriel MD;  [facility]: [facility]    ENDOSCOPIC RETROGRADE CHOLANGIOPANCREATOGRAPHY, EXCHANGE TUBE/STENT N/A 06/10/2021    Procedure: ENDOSCOPIC RETROGRADE CHOLANGIOPANCREATOGRAPHY, WITH biliary stent exchange, stone removal;  Surgeon: Woodrow Lott MD;  Location: [facility]    ENDOSCOPIC RETROGRADE CHOLANGIOPANCREATOGRAPHY, EXCHANGE TUBE/STENT N/A 08/30/2021    Procedure: ENDOSCOPIC RETROGRADE CHOLANGIOPANCREATOGRAPHY with biliary dilation, stone removal, stent exchange;  Surgeon: Gregory Gabriel MD;  Location: [facility]    ENDOSCOPIC RETROGRADE CHOLANGIOPANCREATOGRAPHY, EXCHANGE TUBE/STENT N/A 10/01/2021    Procedure: ENDOSCOPIC RETROGRADE CHOLANGIOPANCREATOGRAPHY with balloon sweep of bile ducts, bile duct stent exchanged;  Surgeon: Gregory Gabriel MD;  Location: [facility]    ESOPHAGOSCOPY, GASTROSCOPY, DUODENOSCOPY (EGD), COMBINED N/A 09/04/2021    Procedure: ESOPHAGOGASTRODUODENOSCOPY (EGD);  Surgeon: Leventhal, Thomas Michael, MD;  Location: [facility]    ESOPHAGOSCOPY, GASTROSCOPY, DUODENOSCOPY (EGD), COMBINED N/A 10/01/2021    Procedure: ESOPHAGOGASTRODUODENOSCOPY (EGD) with varices banding;  Surgeon: Gregory Gabriel MD;  Location: [facility]    GYN SURGERY      bilat fallopian tubes and ovaries removed    HERNIORRHAPHY INCISIONAL (LOCATION) Left  2024    Procedure: open incisional hernia repair WITH MESH;  Surgeon: Ernesto Schmitt MD;  Location: [facility]    IR ERCP  2020    IR ERCP  2021    IR LIVER BIOPSY PERCUTANEOUS  2022    PHACOEMULSIFICATION CLEAR CORNEA WITH TORIC INTRAOCULAR LENS IMPLANT Right 2024    Procedure: RIGHT EYE PHACOEMULSIFICATION, CATARACT, WITH INTRAOCULAR LENS IMPLANT, TORIC LENS;  Surgeon: Dory Palmer MD;  Location: [facility] OR    PICC DOUBLE LUMEN PLACEMENT Right 2021    42cm (2cm external), Lateral brachial vein    RETURN LIVER TRANSPLANT N/A 2021    Procedure: CLOSURE, WOUND, ABDOMEN, SECONDARY, ABDOMINAL WASHOUT;  Surgeon: Ernesto Schmitt MD;  Location: [facility]    TRANSPLANT  2021    Liver due to PSC    TRANSPLANT LIVER RECIPIENT  DONOR N/A 2021    Procedure: Opening of abdomen, Abdominal Exploration and aborted liver transplant.;  Surgeon: Ernesto Schmitt MD;  Location: [facility]    TRANSPLANT LIVER RECIPIENT  DONOR N/A 2021    Procedure: TRANSPLANT, LIVER, RECIPIENT,  DONOR;  Surgeon: Ernesto Schmitt MD;  Location: [facility]       Past Medical History:   Past Medical History:   Diagnosis Date    Ascites     Biliary cirrhosis (H)     Cholangitis, sclerosing (H)     Cirrhosis of liver with ascites (H) 2021    Hearing loss of left ear     wears a hearing aide    History of blood transfusion     History of low potassium     Hyperlipidemia     Hypertension     Liver transplant rejection (H) 2022    Mild Rejection    SBP (spontaneous bacterial peritonitis) (H) 2021    Sjogren's syndrome     Ulcerative colitis (H)     Ulcerative pancolitis (H)        Social History:   Social History     Tobacco Use    Smoking status: Never     Passive exposure: Never    Smokeless tobacco: Never   Substance Use Topics    Alcohol use: No     Comment: Last drink was 2017       Family History:   Family History   Problem Relation Age of Onset     Cancer Mother         angiosarcoma    Hypertension Mother     Hyperlipidemia Mother     Other Cancer Mother     Coronary Artery Disease Early Onset Father         MI age 46    Heart Transplant Father     Coronary Artery Disease Father     Melanoma Sister     Skin Cancer Sister     Liver Disease No family hx of     Ulcerative Colitis No family hx of     Crohn's Disease No family hx of     Glaucoma No family hx of     Macular Degeneration No family hx of     Anesthesia Reaction No family hx of     Deep Vein Thrombosis (DVT) No family hx of        Allergies:   Allergies   Allergen Reactions    Iodinated Contrast Media Hives     PATIENT HAD HIVE REACTION AFTER ADMINISTERING CT CONTRAST DYE.      Contrast Dye        Active Medications:   Current Outpatient Medications   Medication Sig Dispense Refill    Calcium Citrate-Vitamin D (CALCIUM + D PO) Take 1 tablet by mouth 2 times daily.      lisinopril (ZESTRIL) 10 MG tablet Take 1 tablet (10 mg) by mouth daily 90 tablet 3    multivitamin (CENTRUM SILVER) tablet Take 1 tablet by mouth every morning.      tacrolimus (GENERIC) 0.5 MG capsule Take 1 capsule (0.5 mg) by mouth every 12 hours Total dose: 1.5 mg  capsule 3    tacrolimus (GENERIC) 1 MG capsule Take 1 capsule (1 mg) by mouth every 12 hours Total dose: 1.5 mg  capsule 3    acetaminophen (TYLENOL) 325 MG tablet Take 2 tablets (650 mg) by mouth every 6 hours. Max 2 g per day      levofloxacin (LEVAQUIN) 750 MG tablet Take 1 tablet (750 mg) by mouth daily. 6 tablet 0    methocarbamol (ROBAXIN) 750 MG tablet Take 1 tablet (750 mg) by mouth 3 times daily as needed for muscle spasms. 21 tablet 0    ondansetron (ZOFRAN ODT) 4 MG ODT tab Take 1 tablet (4 mg) by mouth every 6 hours as needed for nausea or vomiting. 6 tablet 0    oxyCODONE (ROXICODONE) 5 MG tablet Take 1 tablet (5 mg) by mouth every 4 hours as needed for moderate pain. 12 tablet 0    senna-docusate (SENOKOT-S/PERICOLACE) 8.6-50 MG tablet Take 1  "tablet by mouth 2 times daily. While taking narcotics, hold for loose stools 60 tablet 0       Systemic Review:   CONSTITUTIONAL: NEGATIVE for fever, chills, change in weight  ENT/MOUTH: NEGATIVE for ear, mouth and throat problems  RESP: NEGATIVE for significant cough or SOB  CV: NEGATIVE for chest pain, palpitations or peripheral edema    Physical Examination:   Vital Signs: /77 (BP Location: Right arm)   Pulse 102   Temp 97  F (36.1  C) (Temporal)   Resp 18   Ht 1.651 m (5' 5\")   Wt 72.6 kg (160 lb)   LMP  (LMP Unknown)   SpO2 98%   BMI 26.63 kg/m    GENERAL: healthy, alert and no distress  NECK: no adenopathy, no asymmetry, masses, or scars  RESP: lungs clear to auscultation - no rales, rhonchi or wheezes  CV: regular rate and rhythm, normal S1 S2, no S3 or S4, no murmur, click or rub, no peripheral edema and peripheral pulses strong  ABDOMEN: soft, nontender, no hepatosplenomegaly, no masses and bowel sounds normal  MS: no gross musculoskeletal defects noted, no edema    Plan: Appropriate to proceed as scheduled.      Kendrick Crawford MD  1/27/2025    PCP:  Afshan Penaloza    "

## 2025-01-27 NOTE — ANESTHESIA POSTPROCEDURE EVALUATION
Patient: Berta Nava    Procedure: Procedure(s):  COLONOSCOPY, WITH POLYPECTOMY AND BIOPSY       Anesthesia Type:  MAC    Note:  Disposition: Outpatient   Postop Pain Control: Uneventful            Sign Out: Well controlled pain   PONV: No   Neuro/Psych: Uneventful            Sign Out: Acceptable/Baseline neuro status   Airway/Respiratory: Uneventful            Sign Out: Acceptable/Baseline resp. status   CV/Hemodynamics: Uneventful            Sign Out: Acceptable CV status; No obvious hypovolemia; No obvious fluid overload   Other NRE:    DID A NON-ROUTINE EVENT OCCUR?            Last vitals:  Vitals Value Taken Time   /63 01/27/25 1059   Temp 36.1  C (97  F) 01/27/25 1049   Pulse 77 01/27/25 1059   Resp 16 01/27/25 1049   SpO2 98 % 01/27/25 1107   Vitals shown include unfiled device data.    Electronically Signed By: Ruben Hagen MD  January 27, 2025  11:08 AM

## 2025-01-28 LAB
PATH REPORT.COMMENTS IMP SPEC: NORMAL
PATH REPORT.COMMENTS IMP SPEC: NORMAL
PATH REPORT.FINAL DX SPEC: NORMAL
PATH REPORT.GROSS SPEC: NORMAL
PATH REPORT.MICROSCOPIC SPEC OTHER STN: NORMAL
PATH REPORT.RELEVANT HX SPEC: NORMAL
PHOTO IMAGE: NORMAL

## 2025-03-06 DIAGNOSIS — Z94.4 LIVER REPLACED BY TRANSPLANT (H): ICD-10-CM

## 2025-03-06 RX ORDER — TACROLIMUS 1 MG/1
1 CAPSULE ORAL EVERY 12 HOURS
Qty: 180 CAPSULE | Refills: 3 | Status: SHIPPED | OUTPATIENT
Start: 2025-03-06

## 2025-03-22 ENCOUNTER — HEALTH MAINTENANCE LETTER (OUTPATIENT)
Age: 60
End: 2025-03-22

## 2025-04-03 ENCOUNTER — LAB (OUTPATIENT)
Dept: LAB | Facility: CLINIC | Age: 60
End: 2025-04-03
Payer: COMMERCIAL

## 2025-04-03 DIAGNOSIS — Z94.4 LIVER TRANSPLANTED (H): ICD-10-CM

## 2025-04-03 DIAGNOSIS — E78.5 HYPERLIPIDEMIA: Primary | ICD-10-CM

## 2025-04-03 LAB
ALBUMIN SERPL BCG-MCNC: 4.1 G/DL (ref 3.5–5.2)
ALP SERPL-CCNC: 53 U/L (ref 40–150)
ALT SERPL W P-5'-P-CCNC: 13 U/L (ref 0–50)
ANION GAP SERPL CALCULATED.3IONS-SCNC: 11 MMOL/L (ref 7–15)
AST SERPL W P-5'-P-CCNC: 19 U/L (ref 0–45)
BILIRUB DIRECT SERPL-MCNC: 0.17 MG/DL (ref 0–0.3)
BILIRUB SERPL-MCNC: 0.5 MG/DL
BUN SERPL-MCNC: 23.2 MG/DL (ref 8–23)
CALCIUM SERPL-MCNC: 9.2 MG/DL (ref 8.8–10.4)
CHLORIDE SERPL-SCNC: 104 MMOL/L (ref 98–107)
CHOLEST SERPL-MCNC: 262 MG/DL
CREAT SERPL-MCNC: 0.83 MG/DL (ref 0.51–0.95)
EGFRCR SERPLBLD CKD-EPI 2021: 80 ML/MIN/1.73M2
ERYTHROCYTE [DISTWIDTH] IN BLOOD BY AUTOMATED COUNT: 13.2 % (ref 10–15)
FASTING STATUS PATIENT QL REPORTED: YES
FASTING STATUS PATIENT QL REPORTED: YES
GLUCOSE SERPL-MCNC: 88 MG/DL (ref 70–99)
HCO3 SERPL-SCNC: 23 MMOL/L (ref 22–29)
HCT VFR BLD AUTO: 35 % (ref 35–47)
HDLC SERPL-MCNC: 73 MG/DL
HGB BLD-MCNC: 12.5 G/DL (ref 11.7–15.7)
LDLC SERPL CALC-MCNC: 174 MG/DL
MCH RBC QN AUTO: 29.3 PG (ref 26.5–33)
MCHC RBC AUTO-ENTMCNC: 35.7 G/DL (ref 31.5–36.5)
MCV RBC AUTO: 82 FL (ref 78–100)
NONHDLC SERPL-MCNC: 189 MG/DL
PLATELET # BLD AUTO: 188 10E3/UL (ref 150–450)
POTASSIUM SERPL-SCNC: 4.1 MMOL/L (ref 3.4–5.3)
PROT SERPL-MCNC: 6.9 G/DL (ref 6.4–8.3)
RBC # BLD AUTO: 4.27 10E6/UL (ref 3.8–5.2)
SODIUM SERPL-SCNC: 138 MMOL/L (ref 135–145)
TACROLIMUS BLD-MCNC: 3.2 UG/L (ref 5–15)
TME LAST DOSE: ABNORMAL H
TME LAST DOSE: ABNORMAL H
TRIGL SERPL-MCNC: 77 MG/DL
WBC # BLD AUTO: 7.3 10E3/UL (ref 4–11)

## 2025-04-08 ENCOUNTER — VIRTUAL VISIT (OUTPATIENT)
Dept: INTERNAL MEDICINE | Facility: CLINIC | Age: 60
End: 2025-04-08
Payer: COMMERCIAL

## 2025-04-08 DIAGNOSIS — E78.5 HYPERLIPIDEMIA, UNSPECIFIED HYPERLIPIDEMIA TYPE: Primary | ICD-10-CM

## 2025-04-08 PROCEDURE — 99207 PR NO CHARGE LOS: CPT | Mod: 95

## 2025-04-08 NOTE — PROGRESS NOTES
"Berta is a 59 year old who is being evaluated via a billable video visit.    How would you like to obtain your AVS? Curt  If the video visit is dropped, the invitation should be resent by:curt  Will anyone else be joining your video visit? No      Assessment & Plan     (E78.5) Hyperlipidemia, unspecified hyperlipidemia type  (primary encounter diagnosis)  Comment: Pt presented to discuss recent lipid panel. LDL was 174.  However, we discovered she already had CT calcium scan done and her score was zero.  Thus, we do not need to discuss starting cholesterol medication at this time.  I will not place a charge for this visit as it was my mistake I did not check to see if she already had calcium scan performed. No other concerns were discussed at visit.  Plan:           BMI  Estimated body mass index is 26.63 kg/m  as calculated from the following:    Height as of 1/27/25: 1.651 m (5' 5\").    Weight as of 1/27/25: 72.6 kg (160 lb).           Subjective   Berta is a 59 year old, presenting for the following health issues:    Chief Complaint   Patient presents with    Results     Review labs         4/8/2025     2:08 PM   Additional Questions   Roomed by Veena MORILLO     History of Present Illness       Hyperlipidemia:  She presents for follow up of hyperlipidemia.   She is not taking medication to lower cholesterol. She is not having myalgia or other side effects to statin medications.    She eats 2-3 servings of fruits and vegetables daily.She consumes 0 sweetened beverage(s) daily.She exercises with enough effort to increase her heart rate 30 to 60 minutes per day.  She exercises with enough effort to increase her heart rate 6 days per week.   She is taking medications regularly.            Objective           Vitals:  No vitals were obtained today due to virtual visit.    Physical Exam   GENERAL: alert and no distress  EYES: Eyes grossly normal to inspection.  No discharge or erythema, or obvious " scleral/conjunctival abnormalities.  RESP: No audible wheeze, cough, or visible cyanosis.    SKIN: Visible skin clear. No significant rash, abnormal pigmentation or lesions.  NEURO: Cranial nerves grossly intact.  Mentation and speech appropriate for age.  PSYCH: Appropriate affect, tone, and pace of words          Video-Visit Details    Type of service:  Video Visit   Originating Location (pt. Location): Home    Distant Location (provider location):  On-site  Platform used for Video Visit: Patricia  Signed Electronically by: CHELSEY Cloud CNP

## 2025-04-08 NOTE — NURSING NOTE
"Chief Complaint   Patient presents with    Results     Review labs     initial LMP  (LMP Unknown)  Estimated body mass index is 26.63 kg/m  as calculated from the following:    Height as of 1/27/25: 1.651 m (5' 5\").    Weight as of 1/27/25: 72.6 kg (160 lb)..  bp completed using cuff size NA (Not Taken)  JOSHUA MITCHELL LPN  "

## 2025-04-24 ENCOUNTER — OFFICE VISIT (OUTPATIENT)
Dept: DERMATOLOGY | Facility: CLINIC | Age: 60
End: 2025-04-24
Attending: STUDENT IN AN ORGANIZED HEALTH CARE EDUCATION/TRAINING PROGRAM
Payer: COMMERCIAL

## 2025-04-24 DIAGNOSIS — B35.1 ONYCHOMYCOSIS: ICD-10-CM

## 2025-04-24 DIAGNOSIS — L81.7 PIGMENTED PURPURIC DERMATOSIS: ICD-10-CM

## 2025-04-24 DIAGNOSIS — L57.8 ACTINIC SKIN DAMAGE: Primary | ICD-10-CM

## 2025-04-24 NOTE — PROGRESS NOTES
[facility] Dermatology Note  Encounter Date: Apr 24, 2025  Office Visit     Dermatology Problem List:  1. Liver transplant 11/18/2021  - Tacrolimus, prednisone, mycophenolate  2. Pilar cysts  3. Hx BCC in her 20s, unknown location  4. 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 Mild Rosacea  6. 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 Patient not interested in oral terbinafine today.     # Actinic skin damage  # Benign skin lesions: seborrheic keratoses, cherry angiomas, clinically benign nevi  - Sun protection: Counseled SPF30+ sunscreen, UPF clothing, sun avoidance, tanning bed avoidance.  - NTD. No treatment necessary    #Pigmented purpuric dermatosis  -Stable  - No recent severe flares    Procedures Performed: None     Follow-up: 1 year(s) in-person, or earlier for new or changing lesions    Staff and Medical Student:     Leida Evans, MS4  ____________________________________________    CC: Skin Check (Annual FBSE. H/o BCC. ) and Derm Problem (Onycomycosis on R thumb nail )    HPI:  Ms. Berta Nava is a(n) 59 year old female who presents today as a return patient for FBSE. She was last seen in 04/2024. She had a liver transplant in 2021 and is currently on tacrolimus for immunosuppression. She has a history of BCC in her 20s, but cannot remember the location. In terms of her skin, she has no new concerns today. Denies any areas that are itching, tender, or bleeding.    She continues to have discolored spots on her nails that are yellow/green and thick. She uses OTC fungal cream for it. Her nails are painted, however, so unable to see them today. It bothers her a bit, but she mainly uses nail polish to cover it up.     Patient is otherwise  feeling well, without additional skin concerns.    Labs:  None reviewed.    Physical Exam:  Vitals: LMP  (LMP Unknown)   SKIN: Full skin, which includes the head/face, both arms, chest, back, abdomen,both legs, genitalia and/or groin buttocks, digits and/or nails, was examined.  - Painted nails so unable to fully examen. On the R big thumb there is a small exposed area of thickened yellow/green nail near the nail bed.    - Dome-shaped bright red papules on the trunk and extremities.   - Multiple regular brown pigmented macules and papules are identified on the face, trunk, and extremities. No concerning findings on dermoscopy   - Scattered brown macules on sun exposed areas.  - There are waxy stuck on tan to brown papules on the face, trunk, and extremities.   - No other lesions of concern on areas examined.     Medications:  Current Outpatient Medications   Medication Sig Dispense Refill    Calcium Citrate-Vitamin D (CALCIUM + D PO) Take 1 tablet by mouth 2 times daily.      lisinopril (ZESTRIL) 10 MG tablet Take 1 tablet (10 mg) by mouth daily 90 tablet 3    multivitamin (CENTRUM SILVER) tablet Take 1 tablet by mouth every morning.      tacrolimus (GENERIC EQUIVALENT) 0.5 MG capsule Take 1 capsule (0.5 mg) by mouth every 12 hours. Total dose: 1.5 mg  capsule 3    tacrolimus (GENERIC EQUIVALENT) 1 MG capsule Take 1 capsule (1 mg) by mouth every 12 hours. Total dose: 1.5 mg  capsule 3     No current facility-administered medications for this visit.      Past Medical History:   Patient Active Problem List   Diagnosis    Hypokalemia    Essential hypertension    Hyperlipidemia    Mixed hearing loss, bilateral    Neoplasm of uncertain behavior of skin    Sicca syndrome    Ulcerative pancolitis with complication (H)    Primary sclerosing cholangitis (H)    Acute kidney failure, unspecified    Immunosuppressed status    S/P liver transplant (H)    Anemia due to chronic kidney disease    Liver transplant  rejection (H)    Combined forms of age-related cataract of both eyes    S/P herniorrhaphy     Past Medical History:   Diagnosis Date    Ascites     Biliary cirrhosis (H)     Cholangitis, sclerosing (H)     Cirrhosis of liver with ascites (H) 03/03/2021    Hearing loss of left ear     wears a hearing aide    History of blood transfusion     History of low potassium     Hyperlipidemia     Hypertension     Liver transplant rejection (H) 05/17/2022    Mild Rejection    SBP (spontaneous bacterial peritonitis) (H) 04/30/2021    Sjogren's syndrome     Ulcerative colitis (H)     Ulcerative pancolitis (H)         CC Huseyin Cole MD  [address] [place] [address] on close of this encounter.

## 2025-04-24 NOTE — NURSING NOTE
Dermatology Rooming Note    Berta Nava's goals for this visit include:   Chief Complaint   Patient presents with    Skin Check     Annual FBSE. H/o BCC.     Derm Problem     Onycomycosis on R thumb nail      Lars RABAGO CMA - Dermatology

## 2025-04-24 NOTE — LETTER
4/24/2025       RE: Berta Nava  [id number] [facility] [id number]     Dear Colleague,    Thank you for referring your patient, Berta Nava, to the [facility] DERMATOLOG[facility] [place] at [facility]. Please see a copy of my visit note below.    [facility] Dermatology Note  Encounter Date: Apr 24, 2025  Office Visit     Dermatology Problem List:  1. Liver transplant 11/18/2021  - Tacrolimus, prednisone, mycophenolate  2. Pilar cysts  3. Hx BCC in her 20s, unknown location  4. 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 Mild Rosacea  6. 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 Patient not interested in oral terbinafine today.     # Actinic skin damage  # Benign skin lesions: seborrheic keratoses, cherry angiomas, clinically benign nevi  - Sun protection: Counseled SPF30+ sunscreen, UPF clothing, sun avoidance, tanning bed avoidance.  - NTD. No treatment necessary    #Pigmented purpuric dermatosis  -Stable  - No recent severe flares    Procedures Performed: None     Follow-up: 1 year(s) in-person, or earlier for new or changing lesions    Staff and Medical Student:     Leida Evans, MS4  ____________________________________________    CC: Skin Check (Annual FBSE. H/o BCC. ) and Derm Problem (Onycomycosis on R thumb nail )    HPI:  Ms. Berta Nava is a(n) 59 year old female who presents today as a return patient for FBSE. She was last seen in 04/2024. She had a liver transplant in 2021 and is currently on tacrolimus for immunosuppression. She has a history of BCC in her 20s, but cannot remember the location. In terms of her skin, she has no new concerns today. Denies any areas  that are itching, tender, or bleeding.    She continues to have discolored spots on her nails that are yellow/green and thick. She uses OTC fungal cream for it. Her nails are painted, however, so unable to see them today. It bothers her a bit, but she mainly uses nail polish to cover it up.     Patient is otherwise feeling well, without additional skin concerns.    Labs:  None reviewed.    Physical Exam:  Vitals: LMP  (LMP Unknown)   SKIN: Full skin, which includes the head/face, both arms, chest, back, abdomen,both legs, genitalia and/or groin buttocks, digits and/or nails, was examined.  - Painted nails so unable to fully examen. On the R big thumb there is a small exposed area of thickened yellow/green nail near the nail bed.    - Dome-shaped bright red papules on the trunk and extremities.   - Multiple regular brown pigmented macules and papules are identified on the face, trunk, and extremities. No concerning findings on dermoscopy   - Scattered brown macules on sun exposed areas.  - There are waxy stuck on tan to brown papules on the face, trunk, and extremities.   - No other lesions of concern on areas examined.     Medications:  Current Outpatient Medications   Medication Sig Dispense Refill     Calcium Citrate-Vitamin D (CALCIUM + D PO) Take 1 tablet by mouth 2 times daily.       lisinopril (ZESTRIL) 10 MG tablet Take 1 tablet (10 mg) by mouth daily 90 tablet 3     multivitamin (CENTRUM SILVER) tablet Take 1 tablet by mouth every morning.       tacrolimus (GENERIC EQUIVALENT) 0.5 MG capsule Take 1 capsule (0.5 mg) by mouth every 12 hours. Total dose: 1.5 mg  capsule 3     tacrolimus (GENERIC EQUIVALENT) 1 MG capsule Take 1 capsule (1 mg) by mouth every 12 hours. Total dose: 1.5 mg  capsule 3     No current facility-administered medications for this visit.      Past Medical History:   Patient Active Problem List   Diagnosis     Hypokalemia     Essential hypertension     Hyperlipidemia     Mixed  hearing loss, bilateral     Neoplasm of uncertain behavior of skin     Sicca syndrome     Ulcerative pancolitis with complication (H)     Primary sclerosing cholangitis (H)     Acute kidney failure, unspecified     Immunosuppressed status     S/P liver transplant (H)     Anemia due to chronic kidney disease     Liver transplant rejection (H)     Combined forms of age-related cataract of both eyes     S/P herniorrhaphy     Past Medical History:   Diagnosis Date     Ascites      Biliary cirrhosis (H)      Cholangitis, sclerosing (H)      Cirrhosis of liver with ascites (H) 03/03/2021     Hearing loss of left ear     wears a hearing aide     History of blood transfusion      History of low potassium      Hyperlipidemia      Hypertension      Liver transplant rejection (H) 05/17/2022    Mild Rejection     SBP (spontaneous bacterial peritonitis) (H) 04/30/2021     Sjogren's syndrome      Ulcerative colitis (H)      Ulcerative pancolitis (H)         CC Huseyin Cole MD  [address] [place] [id number] on close of this encounter.    Attestation signed by Huseyin Cole MD at 4/24/2025  1:26 PM:  I have personally examined this patient and agree with the medical student's documentation and plan of care. I have reviewed and amended the medical student's note as necessary.The documentation accurately reflects my clinical observations, diagnoses, treatment and follow-up plans.     Huseyin Cole MD  Dermatology Staff      Again, thank you for allowing me to participate in the care of your patient.      Sincerely,    Huseyin Cole MD

## 2025-05-08 ENCOUNTER — PATIENT OUTREACH (OUTPATIENT)
Dept: CARE COORDINATION | Facility: CLINIC | Age: 60
End: 2025-05-08
Payer: COMMERCIAL

## 2025-06-05 ENCOUNTER — HOSPITAL ENCOUNTER (OUTPATIENT)
Dept: MAMMOGRAPHY | Facility: CLINIC | Age: 60
Discharge: HOME | End: 2025-06-05
Payer: COMMERCIAL

## 2025-06-05 DIAGNOSIS — Z12.31 VISIT FOR SCREENING MAMMOGRAM: ICD-10-CM

## 2025-06-05 PROCEDURE — 77067 SCR MAMMO BI INCL CAD: CPT

## 2025-06-11 DIAGNOSIS — I10 ESSENTIAL HYPERTENSION: ICD-10-CM

## 2025-06-11 RX ORDER — LISINOPRIL 10 MG/1
10 TABLET ORAL DAILY
Qty: 90 TABLET | Refills: 3 | Status: SHIPPED | OUTPATIENT
Start: 2025-06-11

## 2025-06-21 SDOH — HEALTH STABILITY: PHYSICAL HEALTH: ON AVERAGE, HOW MANY DAYS PER WEEK DO YOU ENGAGE IN MODERATE TO STRENUOUS EXERCISE (LIKE A BRISK WALK)?: 7 DAYS

## 2025-06-21 SDOH — HEALTH STABILITY: PHYSICAL HEALTH: ON AVERAGE, HOW MANY MINUTES DO YOU ENGAGE IN EXERCISE AT THIS LEVEL?: 60 MIN

## 2025-06-21 ASSESSMENT — SOCIAL DETERMINANTS OF HEALTH (SDOH): HOW OFTEN DO YOU GET TOGETHER WITH FRIENDS OR RELATIVES?: ONCE A WEEK

## 2025-06-26 ENCOUNTER — OFFICE VISIT (OUTPATIENT)
Dept: INTERNAL MEDICINE | Facility: CLINIC | Age: 60
End: 2025-06-26
Payer: COMMERCIAL

## 2025-06-26 VITALS
TEMPERATURE: 99.3 F | BODY MASS INDEX: 28.64 KG/M2 | WEIGHT: 171.9 LBS | RESPIRATION RATE: 16 BRPM | HEIGHT: 65 IN | DIASTOLIC BLOOD PRESSURE: 80 MMHG | SYSTOLIC BLOOD PRESSURE: 125 MMHG | HEART RATE: 72 BPM | OXYGEN SATURATION: 100 %

## 2025-06-26 DIAGNOSIS — Z00.00 ENCOUNTER FOR PREVENTIVE HEALTH EXAMINATION: Primary | ICD-10-CM

## 2025-06-26 DIAGNOSIS — I10 ESSENTIAL HYPERTENSION: ICD-10-CM

## 2025-06-26 DIAGNOSIS — E78.5 HYPERLIPIDEMIA LDL GOAL <130: ICD-10-CM

## 2025-06-26 DIAGNOSIS — Z23 NEED FOR TDAP VACCINATION: ICD-10-CM

## 2025-06-26 RX ORDER — ATORVASTATIN CALCIUM 10 MG/1
10 TABLET, FILM COATED ORAL DAILY
Qty: 90 TABLET | Refills: 0 | Status: SHIPPED | OUTPATIENT
Start: 2025-06-26

## 2025-06-26 ASSESSMENT — PAIN SCALES - GENERAL: PAINLEVEL_OUTOF10: NO PAIN (0)

## 2025-06-26 NOTE — PROGRESS NOTES
Preventive Care Visit  [facility]  CHELSEY Cloud [facility], Internal Medicine  Jun 26, 2025      Assessment & Plan     (Z00.00) Encounter for preventive health examination  (primary encounter diagnosis)  Comment: Annual visit. She is due for pap smear this year but prefers to do this next year. No history of abnormal pap smears.  Plan:     (E78.5) Hyperlipidemia LDL goal <130  Comment: Despite a recent coronary calcium score of zero, the patient's significant family history of coronary artery disease in her father and an elevated LDL cholesterol level of 174 mg/dL warrant consideration of statin therapy. The patient is agreeable to initiating treatment. We plan to start atorvastatin (Lipitor) 10 mg daily and will re-evaluate her lipid profile in three months. The patient recalls previous use of simvastatin, during which she experienced leg cramps.  Plan: Lipid panel reflex to direct LDL Fasting,         atorvastatin (LIPITOR) 10 MG tablet            (I10) Essential hypertension  Comment: BP under excellent control. Continue Lisinopril 10MG daily  Plan: Albumin Random Urine Quantitative with Creat         Ratio            (Z23) Need for Tdap vaccination  Comment:   Plan: TDAP 10-64Y (ADACEL,BOOSTRIX)            The longitudinal plan of care for the diagnosis(es)/condition(s) as documented were addressed during this visit. Due to the added complexity in care, I will continue to support Berta in the subsequent management and with ongoing continuity of care.      Reviewed preventive health counseling, as reflected in patient instructions  Counseling  Appropriate preventive services were addressed with this patient via screening, questionnaire, or discussion as appropriate for fall prevention, nutrition, physical activity, Tobacco-use cessation, social engagement, weight loss and cognition.  Checklist reviewing preventive services available has been given to the patient.  Reviewed patient's diet,  addressing concerns and/or questions.         Annette Hampton is a 59 year old, presenting for the following:  Physical (fasting)        6/26/2025     8:17 AM   Additional Questions   Roomed by Reina GRIFFITHS   Accompanied by n/a          HPI       Advance Care Planning    Document on file is a Health Care Directive or POLST.        6/21/2025   General Health   How would you rate your overall physical health? Good   Feel stress (tense, anxious, or unable to sleep) Only a little   (!) STRESS CONCERN      6/21/2025   Nutrition   Three or more servings of calcium each day? Yes   Diet: Regular (no restrictions)   How many servings of fruit and vegetables per day? (!) 2-3   How many sweetened beverages each day? 0-1         6/21/2025   Exercise   Days per week of moderate/strenous exercise 7 days   Average minutes spent exercising at this level 60 min         6/21/2025   Social Factors   Frequency of gathering with friends or relatives Once a week   Worry food won't last until get money to buy more No   Food not last or not have enough money for food? No   Do you have housing? (Housing is defined as stable permanent housing and does not include staying outside in a car, in a tent, in an abandoned building, in an overnight shelter, or couch-surfing.) Yes   Are you worried about losing your housing? No   Lack of transportation? No   Unable to get utilities (heat,electricity)? No         6/21/2025   Fall Risk   Fallen 2 or more times in the past year? No   Trouble with walking or balance? No          6/21/2025   Dental   Dentist two times every year? Yes           Today's PHQ-2 Score:       6/21/2025     8:25 AM   PHQ-2 ( 1999 Pfizer)   Q1: Little interest or pleasure in doing things 0   Q2: Feeling down, depressed or hopeless 0   PHQ-2 Score 0         6/21/2025   Substance Use   Alcohol more than 3/day or more than 7/wk Not Applicable   Do you use any other substances recreationally? No     Social History     Tobacco Use     "Smoking status: Never     Passive exposure: Never    Smokeless tobacco: Never   Vaping Use    Vaping status: Never Used   Substance Use Topics    Alcohol use: No     Comment: Last drink was 2017    Drug use: No           6/5/2025   LAST FHS-7 RESULTS   1st degree relative breast or ovarian cancer No   Any relative bilateral breast cancer No   Any male have breast cancer No   Any ONE woman have BOTH breast AND ovarian cancer No   Any woman with breast cancer before 50yrs No   2 or more relatives with breast AND/OR ovarian cancer No   2 or more relatives with breast AND/OR bowel cancer No                6/21/2025   STI Screening   New sexual partner(s) since last STI/HIV test? No     History of abnormal Pap smear: No - age 30- 64 PAP with HPV every 5 years recommended        8/13/2020     9:08 AM   PAP / HPV   PAP-ABSTRACT See Scanned Document      ASCVD Risk   The 10-year ASCVD risk score (Brandee RAMIREZ, et al., 2019) is: 4.8%    Values used to calculate the score:      Age: 59 years      Sex: Female      Is Non- : No      Diabetic: No      Tobacco smoker: No      Systolic Blood Pressure: 125 mmHg      Is BP treated: Yes      HDL Cholesterol: 73 mg/dL      Total Cholesterol: 262 mg/dL           Reviewed and updated as needed this visit by Provider                         Objective    Exam  /80 (BP Location: Left arm, Cuff Size: Adult Regular)   Pulse 72   Temp 99.3  F (37.4  C) (Tympanic)   Resp 16   Ht 1.651 m (5' 5\")   Wt 78 kg (171 lb 14.4 oz)   LMP  (LMP Unknown)   SpO2 100%   BMI 28.61 kg/m     Estimated body mass index is 28.61 kg/m  as calculated from the following:    Height as of this encounter: 1.651 m (5' 5\").    Weight as of this encounter: 78 kg (171 lb 14.4 oz).      Physical Exam  Constitutional:       General: She is not in acute distress.     Appearance: Normal appearance. She is not ill-appearing, toxic-appearing or diaphoretic.   HENT:      Head: " Normocephalic and atraumatic.   Eyes:      Conjunctiva/sclera: Conjunctivae normal.   Cardiovascular:      Rate and Rhythm: Normal rate and regular rhythm.      Heart sounds: Normal heart sounds.   Pulmonary:      Effort: Pulmonary effort is normal.      Breath sounds: Normal breath sounds.   Skin:     General: Skin is warm and dry.   Neurological:      Mental Status: She is alert and oriented to person, place, and time.   Psychiatric:         Mood and Affect: Mood normal.         Behavior: Behavior normal.         Thought Content: Thought content normal.         Judgment: Judgment normal.       Signed Electronically by: CHELSEY Cloud CNP

## 2025-06-26 NOTE — NURSING NOTE
After Visit Summary   11/20/2017    Bere Machuca    MRN: [id number]           Patient Information     Date Of Birth          1959        Visit Information        Provider Department      11/20/2017 2:00 PM Hyun Braden PA-C [facility]        Today's Diagnoses     History of pulmonary embolism    -  1    Type 2 diabetes mellitus without complication, without long-term current use of insulin (H)        Screening for lipid disorders        Benign essential hypertension        Chronic bronchitis, unspecified chronic bronchitis type (H)           Follow-ups after your visit        Follow-up notes from your care team     Return in about 1 month (around 12/20/2017) for Routine Visit.      Who to contact     If you have questions or need follow up information about today's clinic visit or your schedule please contact [facility] directly at [phone].  Normal or non-critical lab and imaging results will be communicated to you by MyChart, letter or phone within 4 business days after the clinic has received the results. If you do not hear from us within 7 days, please contact the clinic through [facility] [employer] or phone. If you have a critical or abnormal lab result, we will notify you by phone as soon as possible.  Submit refill requests through [employer] or call your pharmacy and they will forward the refill request to us. Please allow 3 business days for your refill to be completed.          Additional Information About Your Visit        MyChart Information     [employer] gives you secure access to your electronic health record. If you see a primary care provider, you can also send messages to your care team and make appointments. If you have questions, please call your primary care clinic.  If you do not have a primary care provider, please call [phone] and they will assist you.        Care EveryWhere ID     This is your Care EveryWhere ID. This  Prior to immunization administration, verified patients identity using patient s name and date of birth. Please see Immunization Activity for additional information.     Screening Questionnaire for Adult Immunization    Are you sick today?   No   Do you have allergies to medications, food, a vaccine component or latex?   No   Have you ever had a serious reaction after receiving a vaccination?   No   Do you have a long-term health problem with heart, lung, kidney, or metabolic disease (e.g., diabetes), asthma, a blood disorder, no spleen, complement component deficiency, a cochlear implant, or a spinal fluid leak?  Are you on long-term aspirin therapy?   Yes   Do you have cancer, leukemia, HIV/AIDS, or any other immune system problem?   No   Do you have a parent, brother, or sister with an immune system problem?   No   In the past 3 months, have you taken medications that affect  your immune system, such as prednisone, other steroids, or anticancer drugs; drugs for the treatment of rheumatoid arthritis, Crohn s disease, or psoriasis; or have you had radiation treatments?   No   Have you had a seizure, or a brain or other nervous system problem?   No   During the past year, have you received a transfusion of blood or blood    products, or been given immune (gamma) globulin or antiviral drug?   No   For women: Are you pregnant or is there a chance you could become       pregnant during the next month?   No   Have you received any vaccinations in the past 4 weeks?   No     Immunization questionnaire was positive for at least one answer.  Notified Pcp ordered.      Patient instructed to remain in clinic for 15 minutes afterwards, and to report any adverse reactions.     Screening performed by Reina Gil CMA on 6/26/2025 at 9:00 AM.       could be used by other organizations to access your [facility] medical records  [id number]        Your Vitals Were     Pulse Temperature Pulse Oximetry Breastfeeding? BMI (Body Mass Index)       84 97.4  F (36.3  C) (Oral) 98% No 64.04 kg/m2        Blood Pressure from Last 3 Encounters:   11/20/17 120/80   11/01/17 (!) 152/98   10/09/17 123/72    Weight from Last 3 Encounters:   11/20/17 (!) 177.3 kg (390 lb 12.8 oz)   09/11/17 (!) 174.5 kg (384 lb 9.6 oz)   08/14/17 (!) 172.8 kg (381 lb)              We Performed the Following     Comprehensive metabolic panel     Hemoglobin A1c (BFP)     Lipid Profile (QUEST)     PROTHROMBIN TIME/INR     VENOUS COLLECTION          Today's Medication Changes          These changes are accurate as of: 11/20/17 10:40 PM.  If you have any questions, ask your nurse or doctor.               Start taking these medicines.        Dose/Directions    metFORMIN 500 MG 24 hr tablet   Commonly known as:  GLUCOPHAGE-XR   Used for:  Type 2 diabetes mellitus without complication, without long-term current use of insulin (H)   Started by:  Hyun Braden PA-C        Dose:  1000 mg   Take 2 tablets (1,000 mg) by mouth daily (with dinner)   Quantity:  60 tablet   Refills:  2         These medicines have changed or have updated prescriptions.        Dose/Directions    losartan 50 MG tablet   Commonly known as:  COZAAR   This may have changed:  medication strength   Used for:  Benign essential hypertension   Changed by:  Hyun Braden PA-C        Dose:  50 mg   Take 1 tablet (50 mg) by mouth daily   Quantity:  90 tablet   Refills:  0       warfarin 5 MG tablet   Commonly known as:  COUMADIN   This may have changed:    - how much to take  - how to take this  - when to take this  - additional instructions   Used for:  History of pulmonary embolism   Changed by:  Hyun Braden PA-C        Take 5 mg daily 5 days per week.   Quantity:  30 tablet   Refills:  0            Where to  get your medicines      These medications were sent to [facility] [id number] [facility] - [place], [place] - [address]  [address], [facility] [address]     Phone:  [phone]     fluticasone-vilanterol 200-25 MCG/INH oral inhaler    losartan 50 MG tablet    metFORMIN 500 MG 24 hr tablet    warfarin 5 MG tablet                Primary Care Provider Office Phone # Fax #    Wilma Armenta [phone]       [facility] [address]  [facility] [address]        Equal Access to Services     [facility]: Hadii aad ku hadasho Soomaali, waaxda luqadaha, qaybta kaalmada adeegyadunia, aman chavez . So [facility] [phone].    ATENCIÓN: Si habla español, tiene a norman disposición servicios gratuitos de asistencia lingüística. [facility] [phone].    We comply with applicable federal civil rights laws and [place] laws. We do not discriminate on the basis of race, color, national origin, age, disability, sex, sexual orientation, or gender identity.            Thank you!     Thank you for choosing [facility]  for your care. Our goal is always to provide you with excellent care. Hearing back from our patients is one way we can continue to improve our services. Please take a few minutes to complete the written survey that you may receive in the mail after your visit with us. Thank you!             Your Updated Medication List - Protect others around you: Learn how to safely use, store and throw away your medicines at www.disposemymeds.org.          This list is accurate as of: 11/20/17 10:40 PM.  Always use your most recent med list.                   Brand Name Dispense Instructions for use Diagnosis    * albuterol (2.5 MG/3ML) 0.083% neb solution      Take 3 mLs by nebulization every 6 hours as needed for shortness of breath / dyspnea.  [facility]    Chronic airway obstruction, not elsewhere classified, Personal history of tobacco  use, presenting hazards to health       * PROAIR  (90 BASE) MCG/ACT Inhaler   Generic drug:  albuterol      Inhale 1-2 puffs into the lungs as needed for shortness of breath / dyspnea.  [facility]    Chronic airway obstruction, not elsewhere classified, Personal history of tobacco use, presenting hazards to health       blood glucose lancets standard    no brand specified    100 each    Use to test blood sugar 2-3 times daily or as directed.    Type 2 diabetes mellitus without complication, without long-term current use of insulin (H)       blood glucose monitoring test strip    ISAAC CONTOUR NEXT    100 strip    Use to test blood sugar 2-3 times daily or as directed.    Type 2 diabetes mellitus without complication, without long-term current use of insulin (H)       cholecalciferol 1000 UNITS Tabs      Take 1,000 Units by mouth At Bedtime    Morbid obesity due to excess calories (H)       citalopram 40 MG tablet    celeXA    90 tablet    Take 1 tablet (40 mg) by mouth daily    Major depressive disorder, recurrent episode, moderate (H)       cyclobenzaprine 5 MG tablet    FLEXERIL    30 tablet    TAKE 1 TABLET BY MOUTH AS NEEDED FOR MUSCLE SPASM    Displacement of lumbar intervertebral disc without myelopathy, Spinal stenosis in cervical region       fluticasone-vilanterol 200-25 MCG/INH oral inhaler    BREO ELLIPTA    3 Inhaler    Inhale 1 puff into the lungs daily    Chronic bronchitis, unspecified chronic bronchitis type (H)       gabapentin 300 MG capsule    NEURONTIN     Take 600 mg by mouth 2 times daily        hydrOXYzine 25 MG tablet    ATARAX    90 tablet    Take 1 tablet (25 mg) by mouth At Bedtime    Intrinsic eczema       losartan 50 MG tablet    COZAAR    90 tablet    Take 1 tablet (50 mg) by mouth daily    Benign essential hypertension       mesalamine 1.2 G EC tablet    LIALDA          metFORMIN 500 MG 24 hr tablet    GLUCOPHAGE-XR    60 tablet    Take 2 tablets (1,000 mg) by  mouth daily (with dinner)    Type 2 diabetes mellitus without complication, without long-term current use of insulin (H)       MICROLET LANCETS Misc      TEST 2-3 TIMES DAILY OR AS DIRECTED        Multi-vitamin Tabs tablet      Take 1 tablet by mouth daily        PROBIOTIC ACIDOPHILUS BIOBEADS Caps      Take 1 capsule by mouth At Bedtime    Myalgia and myositis, unspecified       SPIRIVA HANDIHALER 18 MCG capsule   Generic drug:  tiotropium     30 capsule    Inhale contents of one capsule daily.        SUPER B COMPLEX PO      Take 1 tablet by mouth daily        UCERIS 9 MG 24 hr tablet   Generic drug:  budesonide      Take 9 mg by mouth every morning    Ulcerative pancolitis with rectal bleeding (H)       warfarin 5 MG tablet    COUMADIN    30 tablet    Take 5 mg daily 5 days per week.    History of pulmonary embolism       * Notice:  This list has 2 medication(s) that are the same as other medications prescribed for you. Read the directions carefully, and ask your doctor or other care provider to review them with you.

## 2025-07-03 ENCOUNTER — RESULTS FOLLOW-UP (OUTPATIENT)
Dept: TRANSPLANT | Facility: CLINIC | Age: 60
End: 2025-07-03

## 2025-07-03 ENCOUNTER — LAB (OUTPATIENT)
Dept: LAB | Facility: CLINIC | Age: 60
End: 2025-07-03
Payer: COMMERCIAL

## 2025-07-03 DIAGNOSIS — Z94.4 LIVER TRANSPLANTED (H): ICD-10-CM

## 2025-07-03 LAB
ALBUMIN SERPL BCG-MCNC: 4.4 G/DL (ref 3.5–5.2)
ALP SERPL-CCNC: 53 U/L (ref 40–150)
ALT SERPL W P-5'-P-CCNC: 12 U/L (ref 0–50)
ANION GAP SERPL CALCULATED.3IONS-SCNC: 10 MMOL/L (ref 7–15)
AST SERPL W P-5'-P-CCNC: 20 U/L (ref 0–45)
BILIRUB SERPL-MCNC: 0.4 MG/DL
BILIRUBIN DIRECT (ROCHE PRO & PURE): 0.17 MG/DL (ref 0–0.45)
BUN SERPL-MCNC: 18.1 MG/DL (ref 8–23)
CALCIUM SERPL-MCNC: 9.6 MG/DL (ref 8.8–10.4)
CHLORIDE SERPL-SCNC: 105 MMOL/L (ref 98–107)
CREAT SERPL-MCNC: 0.82 MG/DL (ref 0.51–0.95)
EGFRCR SERPLBLD CKD-EPI 2021: 81 ML/MIN/1.73M2
ERYTHROCYTE [DISTWIDTH] IN BLOOD BY AUTOMATED COUNT: 13.3 % (ref 10–15)
GLUCOSE SERPL-MCNC: 88 MG/DL (ref 70–99)
HCO3 SERPL-SCNC: 23 MMOL/L (ref 22–29)
HCT VFR BLD AUTO: 36.1 % (ref 35–47)
HGB BLD-MCNC: 12.9 G/DL (ref 11.7–15.7)
MCH RBC QN AUTO: 29.5 PG (ref 26.5–33)
MCHC RBC AUTO-ENTMCNC: 35.7 G/DL (ref 31.5–36.5)
MCV RBC AUTO: 83 FL (ref 78–100)
PLATELET # BLD AUTO: 202 10E3/UL (ref 150–450)
POTASSIUM SERPL-SCNC: 4.5 MMOL/L (ref 3.4–5.3)
PROT SERPL-MCNC: 7.3 G/DL (ref 6.4–8.3)
RBC # BLD AUTO: 4.37 10E6/UL (ref 3.8–5.2)
SODIUM SERPL-SCNC: 138 MMOL/L (ref 135–145)
TACROLIMUS BLD-MCNC: 3.9 UG/L (ref 5–15)
TME LAST DOSE: ABNORMAL H
TME LAST DOSE: ABNORMAL H
WBC # BLD AUTO: 7.2 10E3/UL (ref 4–11)

## 2025-08-25 ENCOUNTER — MYC MEDICAL ADVICE (OUTPATIENT)
Dept: GASTROENTEROLOGY | Facility: CLINIC | Age: 60
End: 2025-08-25
Payer: COMMERCIAL

## 2025-08-25 DIAGNOSIS — K51.019 ULCERATIVE PANCOLITIS WITH COMPLICATION (H): Primary | ICD-10-CM

## 2025-08-25 DIAGNOSIS — K83.01 PSC (PRIMARY SCLEROSING CHOLANGITIS) (H): ICD-10-CM

## (undated) DEVICE — ESU PENCIL W/COATED BLADE E2450H

## (undated) DEVICE — LINEN TOWEL PACK X30 5481

## (undated) DEVICE — Device

## (undated) DEVICE — INFLATION DEVICE BIG 60 ENDO-AN6012

## (undated) DEVICE — SUCTION TIP YANKAUER STR K87

## (undated) DEVICE — KIT HAND CONTROL ACIST 014644 AR-P54

## (undated) DEVICE — DRSG XEROFORM 5X9" CUR253590W

## (undated) DEVICE — STPL LINEAR 30X2.5MM VASC TX30V

## (undated) DEVICE — ENDO TUBING CO2 SMARTCAP STERILE DISP 100145CO2EXT

## (undated) DEVICE — SU PDS II 6-0 RB-2DA 30" Z149H

## (undated) DEVICE — SYR 01ML 27GA 0.5" NDL TBC 309623

## (undated) DEVICE — KIT ENDO FIRST STEP DISINFECTANT 200ML W/POUCH EP-4

## (undated) DEVICE — ENDO EXTRACTOR BALLOON 09-12MM 4690

## (undated) DEVICE — SU SILK 2-0 TIE 12X30" A305H

## (undated) DEVICE — PACK ENDOSCOPY GI CUSTOM UMMC

## (undated) DEVICE — SOL NACL 0.9% IRRIG 1000ML BOTTLE 2F7124

## (undated) DEVICE — BLADE KNIFE SURG 11 371111

## (undated) DEVICE — SU SILK 1 TIE 6X30" A307H

## (undated) DEVICE — KIT CONNECTOR FOR OLYMPUS ENDOSCOPES DEFENDO 100310

## (undated) DEVICE — ESU GROUND PAD THERMOGUARD PLUS ABC PEDS 7-382

## (undated) DEVICE — SUCTION MANIFOLD NEPTUNE 2 SYS 4 PORT 0702-020-000

## (undated) DEVICE — DEFIB PRO-PADZ LVP LQD GEL ADULT 8900-2105-01

## (undated) DEVICE — SURGICEL HEMOSTAT 4X8" 1952

## (undated) DEVICE — TUBING IRRIG CYSTO/BLADDER SET 81" LF 2C4040

## (undated) DEVICE — STPL SKIN 35W ROTATING HEAD PRW35

## (undated) DEVICE — SOL WATER IRRIG 1000ML BOTTLE 2F7114

## (undated) DEVICE — SU PROLENE 4-0 RB-1DA 18" 8757H

## (undated) DEVICE — SURGICEL FIBRILLAR HEMOSTAT 4"X4" 1963

## (undated) DEVICE — ENDO SNARE POLYPECTOMY OVAL 15MM LOOP SD-240U-15

## (undated) DEVICE — SHTH INTRO 0.021IN ID 6FR DIA

## (undated) DEVICE — EYE PACK CUSTOM ANTERIOR 30DEG TIP CENTURION PPK6682-04

## (undated) DEVICE — WIRE GUIDE 0.025"X270CM ANG VISIGLIDE G-240-2527A

## (undated) DEVICE — KIT ENDO TURNOVER/PROCEDURE CARRY-ON 101822

## (undated) DEVICE — ENDO BASKET RETRIEVAL TRAPEZOID 2.0CM 1087

## (undated) DEVICE — CLIP APPLIER 13" LG LIGACLIP MCL20

## (undated) DEVICE — SU SILK 3-0 SH CR 8X18" C013D

## (undated) DEVICE — TUBING SUCTION 12"X1/4" N612

## (undated) DEVICE — SU PROLENE 4-0 RB-1DA 36" 8557H

## (undated) DEVICE — PAD CHUX UNDERPAD 23X24" 7136

## (undated) DEVICE — LINEN TOWEL PACK X5 5464

## (undated) DEVICE — SOL NACL 0.9% INJ 1000ML BAG 2B1324X

## (undated) DEVICE — ENDO FUSION OMNI-TOME G31903

## (undated) DEVICE — ENDO BITE BLOCK ADULT OMNI-BLOC

## (undated) DEVICE — DRAIN JACKSON PRATT ROUND SIL 19FR W/TROCAR LF JP-2232

## (undated) DEVICE — DRAPE SLEEVE 599

## (undated) DEVICE — ESU HANDPIECE ABC BEND-A-BEAM 6" 134006

## (undated) DEVICE — DRAPE SLUSH/WARMER 66X44" ORS-320

## (undated) DEVICE — CLIP ENDO HEMO-LOC GREEN MED/LG 544230

## (undated) DEVICE — ESU GROUND PAD ADULT W/CORD E7507

## (undated) DEVICE — TUBING SUCTION MEDI-VAC 1/4"X20' N620A

## (undated) DEVICE — SU VICRYL+ 3-0 27IN SH UND VCP416H

## (undated) DEVICE — TUBE FEEDING NEOCONNECT SIL ENFIT 5FR 40CM PFTS5.0S-NC

## (undated) DEVICE — SUCTION TIP YANKAUER VIAGUARD W/SLEEVE SMP-2006-001SS

## (undated) DEVICE — TUBING PRESSURE 30"

## (undated) DEVICE — DRAPE SHEET REV FOLD 3/4 9349

## (undated) DEVICE — LINEN TOWEL PACK X6 WHITE 5487

## (undated) DEVICE — ENDO CAP AND TUBING STERILE FOR ENDOGATOR  100130

## (undated) DEVICE — CATH ANGIO SUPERTORQUE PLUS JR4 6FRX100CM 533621

## (undated) DEVICE — SOL WATER IRRIG 500ML BOTTLE 2F7113

## (undated) DEVICE — ENDO DEVICE LOCKING AND BIOPSY CAP M00545261

## (undated) DEVICE — CATH ANGIO INFINITI JR4 4FRX100CM 538421

## (undated) DEVICE — GOWN IMPERVIOUS 2XL BLUE

## (undated) DEVICE — SYR 10ML LL W/O NDL 302995

## (undated) DEVICE — SU MONOCRYL 4-0 PS-2 27" UND Y426H

## (undated) DEVICE — GUIDEWIRE TERUMO .035X260CM EXCHANGE ANG GS3509

## (undated) DEVICE — SPECIMEN CONTAINER 3OZ W/FORMALIN 59901

## (undated) DEVICE — SU SILK 0 TIE 6X30" A306H

## (undated) DEVICE — SU SILK 4-0 TIE 12X30" A303H

## (undated) DEVICE — CATH RETRIEVAL BALLOON EXTRACTOR PRO RX-S INJ ABOVE 9-12MM

## (undated) DEVICE — EYE CANN IRR 25GA HYDRODISSECTING 585037

## (undated) DEVICE — GRAFT STRATTICE 16X20CM FIRM 1620002 CHG PER SQ CM=320 UNITS: Type: IMPLANTABLE DEVICE | Site: ABDOMEN | Status: NON-FUNCTIONAL

## (undated) DEVICE — DRAPE IOBAN INCISE 23X17" 6650EZ

## (undated) DEVICE — PACK CATARACT CUSTOM ASC SEY15CPUMC

## (undated) DEVICE — EYE SHIELD PLASTIC

## (undated) DEVICE — ENDO FORCEP SPIKED SERRATED SHAFT JUMBO 239CM G56998

## (undated) DEVICE — BIOPSY VALVE BIOSHIELD 00711135

## (undated) DEVICE — ESU ELEC BLADE 6" COATED E1450-6

## (undated) DEVICE — DRSG MEPILEX BORDER SACRUM 6.3X7.9" 282055

## (undated) DEVICE — SU PROLENE 4-0 SHDA 36" 8521H

## (undated) DEVICE — DRAPE POUCH INSTRUMENT 1018

## (undated) DEVICE — ENDO FORCEP BX CAPTURA PRO SPIKE G50696

## (undated) DEVICE — GLOVE BIOGEL PI ULTRATOUCH G SZ 7.5 42175

## (undated) DEVICE — SU PROLENE 0 CTX 30" 8454H

## (undated) DEVICE — NDL BLUNT 18GA 1" W/O FILTER 305181

## (undated) DEVICE — DRSG PRIMAPORE 02X3" 7133

## (undated) DEVICE — ENDO BASKET RETRIEVAL F/BILE DUCT STN FG-V431P

## (undated) DEVICE — ASSURANCE CLIP

## (undated) DEVICE — SUCTION TIP YANKAUER W/O VENT K86

## (undated) DEVICE — PREP CHLORAPREP 26ML TINTED HI-LITE ORANGE 930815

## (undated) DEVICE — CATARACT BLADE PACK 2.4MM 58001897

## (undated) DEVICE — NDL CANNULA INTERLINK BLUNT 18GA

## (undated) DEVICE — SUCTION MANIFOLD NEPTUNE 2 SYS 1 PORT 702-025-000

## (undated) DEVICE — SYR BULB IRRIG DOVER 60 ML LATEX FREE 67000

## (undated) DEVICE — DRSG ABTHERA NEG PRESSURE VAC ABD SENSAT M8275026/5.S

## (undated) DEVICE — BNDG ABDOMINAL BINDER 9X45-62" 79-89071

## (undated) DEVICE — DRSG GAUZE 4X4" 3033

## (undated) DEVICE — SU PROLENE 3-0 RB-1DA 36"  8558H

## (undated) DEVICE — DRSG MEDIPORE 3 1/2X13 3/4" 3573

## (undated) DEVICE — TUBE FEEDING NEOCONNECT POLY ENFIT 8FR 24" PFTM8.0P-NC

## (undated) DEVICE — WIRE GUIDE 0.025"X270CM SHORT ANG VISIGLIDE 2 G-260-2527A

## (undated) DEVICE — BASIN SET SINGLE STERILE 13752-624

## (undated) DEVICE — SYR 30ML LL W/O NDL 302832

## (undated) DEVICE — ESU LIGASURE IMPACT OPEN SEALER/DVDR CVD LG JAW LF4418

## (undated) DEVICE — DRAIN JACKSON PRATT RESERVOIR 400ML SU130-1000

## (undated) DEVICE — SPONGE LAP 18X18" X8435

## (undated) DEVICE — CATH ANGIO 6FR JL3.5 100CM ST+

## (undated) DEVICE — SU PROLENE 5-0 RB-1DA 36"  8556H

## (undated) DEVICE — SU SILK 3-0 SH-1 CR 8X18" C003D

## (undated) DEVICE — SU VICRYL 0 CT-1 CR 8X27" UND JJ41G

## (undated) DEVICE — TUBING SUCTION 10'X3/16" N510

## (undated) DEVICE — DRSG TEGADERM 8X12" 1629

## (undated) DEVICE — CATH BALLOON DILATING BIL FUSION TITAN 12FR 4MMX4CM G49219

## (undated) DEVICE — CLEANSER JET LAVAGE IRRISEPT 0.05% CHG IRRISEPT45USA

## (undated) DEVICE — BALLOON EXTRACTION 15X1950MM 3.2MM TL B-V243Q-A

## (undated) DEVICE — PACK HEART LEFT CUSTOM

## (undated) DEVICE — SU SILK 3-0 TIE 12X30" A304H

## (undated) DEVICE — GLIDEWIRE TERUMO .035X180CM 1.5,, J-TIP GR3525

## (undated) DEVICE — SU SILK 0 SH 30" K834H

## (undated) DEVICE — TAPE MICROPORE 2"X1.5YD 1530S-2

## (undated) DEVICE — GW VASC .035IN DIA 260CML 7CML 3 MM RADIUS J CURVE 502455

## (undated) DEVICE — SYR 30ML SLIP TIP W/O NDL 302833

## (undated) DEVICE — ENDO LIGATOR UNIV 6 BAND G31917 MBL-U-6

## (undated) DEVICE — CATH ANGIO INFINITI JL3.5 4FRX100CM 538418

## (undated) DEVICE — SU VICRYL 2-0 CT-1 27" UND J259H

## (undated) DEVICE — SU DERMABOND ADVANCED .7ML DNX12

## (undated) DEVICE — DRSG TEGADERM 4X4 3/4" 1626W

## (undated) DEVICE — ENDO TRAP POLYP E-TRAP 00711099

## (undated) DEVICE — CLIP ENDO HEMO-LOC PURPLE LG 544240

## (undated) DEVICE — DRSG QUICKCLOT TRAUMA PAD 8X8" 4010

## (undated) DEVICE — GOWN REINFORCED XXLG 9071

## (undated) DEVICE — RETR VISCERA FISH MED 3204

## (undated) DEVICE — TAPE MEASURE PAPER 36" LF NI14-1300

## (undated) DEVICE — DRAPE EYE SHEET 8441

## (undated) DEVICE — DRAPE IOBAN C-SECTION W/POUCH 30X35" 6657

## (undated) DEVICE — SU PROLENE 1 CTX 30" 8455H

## (undated) DEVICE — SNARE CAPIVATOR ROUND COLD SNR BX10 M00561101

## (undated) DEVICE — DRSG QUICKCLOT TRAUMA PAD 12X12 4030

## (undated) DEVICE — STATLOCK PSI DEVICE

## (undated) DEVICE — SURGICEL ABSORBABLE HEMOSTAT SNOW 4"X4" 2083

## (undated) DEVICE — SU PROLENE 6-0 RB-2DA 30" 8711H

## (undated) DEVICE — CLIP APPLIER 11" MED LIGACLIP MCM30

## (undated) DEVICE — DECANTER BAG 2002S

## (undated) DEVICE — SU ETHILON 2-0 FS 18" 664H

## (undated) DEVICE — ENDO SNARE POLYPECTOMY OVAL 10MM LOOP SD-240U-10

## (undated) DEVICE — SU SILK 5-0 TIE 12X30" A302H

## (undated) DEVICE — BOWL STERILE 32OZ DYND50320

## (undated) DEVICE — SU PDS II 1 TP-1 54" Z879G

## (undated) DEVICE — SU VICRYL 3-0 SH 27" J316H

## (undated) DEVICE — LABEL MEDICATION SYSTEM 3303-P

## (undated) DEVICE — RX VISTASEAL FIBRIN SEALANT W/THROMBIN 10ML VST10

## (undated) DEVICE — EYE TIP IRRIGATION & ASPIRATION POLYMER 35D BENT 8065751511

## (undated) DEVICE — WIRE GUIDE 0.025"X270CM STR VISIGLIDE G-240-2527S

## (undated) DEVICE — ESU PENCIL SMOKE EVAC W/ROCKER SWITCH 0703-047-000

## (undated) DEVICE — SLEEVE TR BAND RADIAL COMPRESSION DEVICE 24CM TRB24-REG

## (undated) DEVICE — DEVICE SUTURE PASSER 14GA WECK EFX EFXSP2

## (undated) DEVICE — SU VICRYL 3-0 SH 27" UND J416H

## (undated) DEVICE — ENDO CATH BALLOON DILATION HURRICANE 06MMX4X180CM M00545920

## (undated) DEVICE — CANISTER WOUND VAC W/GEL 1000ML M8275093/5

## (undated) DEVICE — CATH TRAY FOLEY SURESTEP 16FR W/TMP PRB STLK LATEX A319416AM

## (undated) DEVICE — ENDO CATH BALLOON DILATION HURRICANE 04MMX4X180CM M00545900

## (undated) DEVICE — SUCTION CATH AIRLIFE TRI-FLO W/CONTROL PORT 14FR  T60C

## (undated) DEVICE — WIPES FOLEY CARE SURESTEP PROVON DFC100

## (undated) DEVICE — CLIP APPLIER 09 3/8" SM LIGACLIP MCS20

## (undated) DEVICE — NDL COUNTER 40CT  31142311

## (undated) DEVICE — SU PROLENE 7-0 BV-1DA 30" 8703H

## (undated) DEVICE — SU ETHILON 3-0 PS-1 18" 1663H

## (undated) RX ORDER — FENTANYL CITRATE-0.9 % NACL/PF 10 MCG/ML
PLASTIC BAG, INJECTION (ML) INTRAVENOUS
Status: DISPENSED
Start: 2021-05-10

## (undated) RX ORDER — HYDROMORPHONE HYDROCHLORIDE 1 MG/ML
INJECTION, SOLUTION INTRAMUSCULAR; INTRAVENOUS; SUBCUTANEOUS
Status: DISPENSED
Start: 2024-11-06

## (undated) RX ORDER — EPINEPHRINE 1 MG/ML
INJECTION, SOLUTION INTRAMUSCULAR; SUBCUTANEOUS
Status: DISPENSED
Start: 2021-08-30

## (undated) RX ORDER — FENTANYL CITRATE-0.9 % NACL/PF 10 MCG/ML
PLASTIC BAG, INJECTION (ML) INTRAVENOUS
Status: DISPENSED
Start: 2021-08-30

## (undated) RX ORDER — PROPOFOL 10 MG/ML
INJECTION, EMULSION INTRAVENOUS
Status: DISPENSED
Start: 2021-05-10

## (undated) RX ORDER — DOBUTAMINE HYDROCHLORIDE 200 MG/100ML
INJECTION INTRAVENOUS
Status: DISPENSED
Start: 2021-04-20

## (undated) RX ORDER — DEXAMETHASONE SODIUM PHOSPHATE 4 MG/ML
INJECTION, SOLUTION INTRA-ARTICULAR; INTRALESIONAL; INTRAMUSCULAR; INTRAVENOUS; SOFT TISSUE
Status: DISPENSED
Start: 2021-08-30

## (undated) RX ORDER — ATROPINE SULFATE 0.4 MG/ML
AMPUL (ML) INJECTION
Status: DISPENSED
Start: 2021-04-20

## (undated) RX ORDER — ONDANSETRON 2 MG/ML
INJECTION INTRAMUSCULAR; INTRAVENOUS
Status: DISPENSED
Start: 2021-05-05

## (undated) RX ORDER — PROPOFOL 10 MG/ML
INJECTION, EMULSION INTRAVENOUS
Status: DISPENSED
Start: 2021-07-22

## (undated) RX ORDER — FENTANYL CITRATE 50 UG/ML
INJECTION, SOLUTION INTRAMUSCULAR; INTRAVENOUS
Status: DISPENSED
Start: 2021-08-30

## (undated) RX ORDER — FENTANYL CITRATE 50 UG/ML
INJECTION, SOLUTION INTRAMUSCULAR; INTRAVENOUS
Status: DISPENSED
Start: 2024-11-06

## (undated) RX ORDER — INDOMETHACIN 50 MG/1
SUPPOSITORY RECTAL
Status: DISPENSED
Start: 2021-10-01

## (undated) RX ORDER — FENTANYL CITRATE 50 UG/ML
INJECTION, SOLUTION INTRAMUSCULAR; INTRAVENOUS
Status: DISPENSED
Start: 2022-05-17

## (undated) RX ORDER — METOPROLOL TARTRATE 1 MG/ML
INJECTION, SOLUTION INTRAVENOUS
Status: DISPENSED
Start: 2021-04-20

## (undated) RX ORDER — SIMETHICONE 40MG/0.6ML
SUSPENSION, DROPS(FINAL DOSAGE FORM)(ML) ORAL
Status: DISPENSED
Start: 2021-08-30

## (undated) RX ORDER — EPINEPHRINE 1 MG/ML
INJECTION, SOLUTION INTRAMUSCULAR; SUBCUTANEOUS
Status: DISPENSED
Start: 2021-10-01

## (undated) RX ORDER — DIPHENHYDRAMINE HYDROCHLORIDE 50 MG/ML
INJECTION INTRAMUSCULAR; INTRAVENOUS
Status: DISPENSED
Start: 2021-08-19

## (undated) RX ORDER — EPHEDRINE SULFATE 50 MG/ML
INJECTION, SOLUTION INTRAMUSCULAR; INTRAVENOUS; SUBCUTANEOUS
Status: DISPENSED
Start: 2021-05-10

## (undated) RX ORDER — FENTANYL CITRATE-0.9 % NACL/PF 10 MCG/ML
PLASTIC BAG, INJECTION (ML) INTRAVENOUS
Status: DISPENSED
Start: 2024-11-06

## (undated) RX ORDER — ROCURONIUM BROMIDE 50 MG/5 ML
SYRINGE (ML) INTRAVENOUS
Status: DISPENSED
Start: 2021-11-19

## (undated) RX ORDER — ALBUMIN, HUMAN INJ 5% 5 %
SOLUTION INTRAVENOUS
Status: DISPENSED
Start: 2021-08-19

## (undated) RX ORDER — OXYMETAZOLINE HYDROCHLORIDE 0.05 G/100ML
SPRAY NASAL
Status: DISPENSED
Start: 2021-08-19

## (undated) RX ORDER — FENTANYL CITRATE-0.9 % NACL/PF 10 MCG/ML
PLASTIC BAG, INJECTION (ML) INTRAVENOUS
Status: DISPENSED
Start: 2021-06-25

## (undated) RX ORDER — NITROGLYCERIN 5 MG/ML
VIAL (ML) INTRAVENOUS
Status: DISPENSED
Start: 2021-08-25

## (undated) RX ORDER — FENTANYL CITRATE 50 UG/ML
INJECTION, SOLUTION INTRAMUSCULAR; INTRAVENOUS
Status: DISPENSED
Start: 2021-10-01

## (undated) RX ORDER — INDOMETHACIN 50 MG/1
SUPPOSITORY RECTAL
Status: DISPENSED
Start: 2021-08-30

## (undated) RX ORDER — SODIUM CHLORIDE 9 MG/ML
INJECTION, SOLUTION INTRAVENOUS
Status: DISPENSED
Start: 2022-05-17

## (undated) RX ORDER — ONDANSETRON 2 MG/ML
INJECTION INTRAMUSCULAR; INTRAVENOUS
Status: DISPENSED
Start: 2024-11-06

## (undated) RX ORDER — EPHEDRINE SULFATE 50 MG/ML
INJECTION, SOLUTION INTRAMUSCULAR; INTRAVENOUS; SUBCUTANEOUS
Status: DISPENSED
Start: 2021-08-30

## (undated) RX ORDER — FENTANYL CITRATE 50 UG/ML
INJECTION, SOLUTION INTRAMUSCULAR; INTRAVENOUS
Status: DISPENSED
Start: 2021-08-19

## (undated) RX ORDER — FENTANYL CITRATE 50 UG/ML
INJECTION, SOLUTION INTRAMUSCULAR; INTRAVENOUS
Status: DISPENSED
Start: 2021-07-22

## (undated) RX ORDER — INDOMETHACIN 50 MG/1
SUPPOSITORY RECTAL
Status: DISPENSED
Start: 2021-07-22

## (undated) RX ORDER — FENTANYL CITRATE 50 UG/ML
INJECTION, SOLUTION INTRAMUSCULAR; INTRAVENOUS
Status: DISPENSED
Start: 2021-05-10

## (undated) RX ORDER — LEVOFLOXACIN 5 MG/ML
INJECTION, SOLUTION INTRAVENOUS
Status: DISPENSED
Start: 2021-06-25

## (undated) RX ORDER — EPINEPHRINE IN SOD CHLOR,ISO 1 MG/10 ML
SYRINGE (ML) INTRAVENOUS
Status: DISPENSED
Start: 2021-09-04

## (undated) RX ORDER — PIPERACILLIN SODIUM, TAZOBACTAM SODIUM 3; .375 G/15ML; G/15ML
INJECTION, POWDER, LYOPHILIZED, FOR SOLUTION INTRAVENOUS
Status: DISPENSED
Start: 2024-11-06

## (undated) RX ORDER — DEXAMETHASONE SODIUM PHOSPHATE 4 MG/ML
INJECTION, SOLUTION INTRA-ARTICULAR; INTRALESIONAL; INTRAMUSCULAR; INTRAVENOUS; SOFT TISSUE
Status: DISPENSED
Start: 2021-05-10

## (undated) RX ORDER — FENTANYL CITRATE 50 UG/ML
INJECTION, SOLUTION INTRAMUSCULAR; INTRAVENOUS
Status: DISPENSED
Start: 2021-05-05

## (undated) RX ORDER — ALBUMIN (HUMAN) 12.5 G/50ML
SOLUTION INTRAVENOUS
Status: DISPENSED
Start: 2021-04-29

## (undated) RX ORDER — HEPARIN SODIUM 1000 [USP'U]/ML
INJECTION, SOLUTION INTRAVENOUS; SUBCUTANEOUS
Status: DISPENSED
Start: 2021-08-19

## (undated) RX ORDER — HYDROMORPHONE HCL IN WATER/PF 6 MG/30 ML
PATIENT CONTROLLED ANALGESIA SYRINGE INTRAVENOUS
Status: DISPENSED
Start: 2024-11-06

## (undated) RX ORDER — IOPAMIDOL 510 MG/ML
INJECTION, SOLUTION INTRAVASCULAR
Status: DISPENSED
Start: 2021-10-01

## (undated) RX ORDER — FENTANYL CITRATE 50 UG/ML
INJECTION, SOLUTION INTRAMUSCULAR; INTRAVENOUS
Status: DISPENSED
Start: 2021-11-19

## (undated) RX ORDER — LIDOCAINE HYDROCHLORIDE 20 MG/ML
INJECTION, SOLUTION EPIDURAL; INFILTRATION; INTRACAUDAL; PERINEURAL
Status: DISPENSED
Start: 2021-08-30

## (undated) RX ORDER — SIMETHICONE 40MG/0.6ML
SUSPENSION, DROPS(FINAL DOSAGE FORM)(ML) ORAL
Status: DISPENSED
Start: 2021-10-01

## (undated) RX ORDER — PAPAVERINE HYDROCHLORIDE 30 MG/ML
INJECTION INTRAMUSCULAR; INTRAVENOUS
Status: DISPENSED
Start: 2021-08-19

## (undated) RX ORDER — DEXAMETHASONE SODIUM PHOSPHATE 4 MG/ML
INJECTION, SOLUTION INTRA-ARTICULAR; INTRALESIONAL; INTRAMUSCULAR; INTRAVENOUS; SOFT TISSUE
Status: DISPENSED
Start: 2021-06-25

## (undated) RX ORDER — ONDANSETRON 2 MG/ML
INJECTION INTRAMUSCULAR; INTRAVENOUS
Status: DISPENSED
Start: 2021-06-10

## (undated) RX ORDER — FENTANYL CITRATE 50 UG/ML
INJECTION, SOLUTION INTRAMUSCULAR; INTRAVENOUS
Status: DISPENSED
Start: 2021-06-10

## (undated) RX ORDER — FENTANYL CITRATE 50 UG/ML
INJECTION, SOLUTION INTRAMUSCULAR; INTRAVENOUS
Status: DISPENSED
Start: 2021-06-25

## (undated) RX ORDER — DEXAMETHASONE SODIUM PHOSPHATE 4 MG/ML
INJECTION, SOLUTION INTRA-ARTICULAR; INTRALESIONAL; INTRAMUSCULAR; INTRAVENOUS; SOFT TISSUE
Status: DISPENSED
Start: 2024-11-06

## (undated) RX ORDER — ONDANSETRON 2 MG/ML
INJECTION INTRAMUSCULAR; INTRAVENOUS
Status: DISPENSED
Start: 2021-06-25

## (undated) RX ORDER — LIDOCAINE HYDROCHLORIDE 10 MG/ML
INJECTION, SOLUTION EPIDURAL; INFILTRATION; INTRACAUDAL; PERINEURAL
Status: DISPENSED
Start: 2021-08-25

## (undated) RX ORDER — HEPARIN SODIUM 1000 [USP'U]/ML
INJECTION, SOLUTION INTRAVENOUS; SUBCUTANEOUS
Status: DISPENSED
Start: 2021-08-25

## (undated) RX ORDER — SIMETHICONE 40MG/0.6ML
SUSPENSION, DROPS(FINAL DOSAGE FORM)(ML) ORAL
Status: DISPENSED
Start: 2021-09-04

## (undated) RX ORDER — ACETAMINOPHEN 325 MG/1
TABLET ORAL
Status: DISPENSED
Start: 2024-07-11

## (undated) RX ORDER — NICARDIPINE HCL-0.9% SOD CHLOR 1 MG/10 ML
SYRINGE (ML) INTRAVENOUS
Status: DISPENSED
Start: 2021-08-25

## (undated) RX ORDER — PROPOFOL 10 MG/ML
INJECTION, EMULSION INTRAVENOUS
Status: DISPENSED
Start: 2021-06-25

## (undated) RX ORDER — FENTANYL CITRATE 50 UG/ML
INJECTION, SOLUTION INTRAMUSCULAR; INTRAVENOUS
Status: DISPENSED
Start: 2024-07-11

## (undated) RX ORDER — FENTANYL CITRATE 50 UG/ML
INJECTION, SOLUTION INTRAMUSCULAR; INTRAVENOUS
Status: DISPENSED
Start: 2021-08-25

## (undated) RX ORDER — FENTANYL CITRATE 50 UG/ML
INJECTION, SOLUTION INTRAMUSCULAR; INTRAVENOUS
Status: DISPENSED
Start: 2021-09-04

## (undated) RX ORDER — ONDANSETRON 2 MG/ML
INJECTION INTRAMUSCULAR; INTRAVENOUS
Status: DISPENSED
Start: 2021-08-30

## (undated) RX ORDER — ONDANSETRON 2 MG/ML
INJECTION INTRAMUSCULAR; INTRAVENOUS
Status: DISPENSED
Start: 2021-10-01

## (undated) RX ORDER — LIDOCAINE HYDROCHLORIDE 20 MG/ML
SOLUTION OROPHARYNGEAL
Status: DISPENSED
Start: 2021-09-07

## (undated) RX ORDER — PROPOFOL 10 MG/ML
INJECTION, EMULSION INTRAVENOUS
Status: DISPENSED
Start: 2021-06-10

## (undated) RX ORDER — LIDOCAINE HYDROCHLORIDE 20 MG/ML
SOLUTION OROPHARYNGEAL
Status: DISPENSED
Start: 2021-09-01

## (undated) RX ORDER — LIDOCAINE HYDROCHLORIDE 10 MG/ML
INJECTION, SOLUTION EPIDURAL; INFILTRATION; INTRACAUDAL; PERINEURAL
Status: DISPENSED
Start: 2022-05-17

## (undated) RX ORDER — LIDOCAINE HYDROCHLORIDE 20 MG/ML
INJECTION, SOLUTION EPIDURAL; INFILTRATION; INTRACAUDAL; PERINEURAL
Status: DISPENSED
Start: 2021-06-25

## (undated) RX ORDER — ALBUMIN (HUMAN) 12.5 G/50ML
SOLUTION INTRAVENOUS
Status: DISPENSED
Start: 2021-04-13

## (undated) RX ORDER — ALBUMIN (HUMAN) 12.5 G/50ML
SOLUTION INTRAVENOUS
Status: DISPENSED
Start: 2021-11-15

## (undated) RX ORDER — FENTANYL CITRATE-0.9 % NACL/PF 10 MCG/ML
PLASTIC BAG, INJECTION (ML) INTRAVENOUS
Status: DISPENSED
Start: 2021-05-05

## (undated) RX ORDER — GLYCOPYRROLATE 0.2 MG/ML
INJECTION, SOLUTION INTRAMUSCULAR; INTRAVENOUS
Status: DISPENSED
Start: 2021-08-30

## (undated) RX ORDER — LIDOCAINE HYDROCHLORIDE 20 MG/ML
INJECTION, SOLUTION EPIDURAL; INFILTRATION; INTRACAUDAL; PERINEURAL
Status: DISPENSED
Start: 2021-05-10

## (undated) RX ORDER — ONDANSETRON 2 MG/ML
INJECTION INTRAMUSCULAR; INTRAVENOUS
Status: DISPENSED
Start: 2021-05-10

## (undated) RX ORDER — IOPAMIDOL 510 MG/ML
INJECTION, SOLUTION INTRAVASCULAR
Status: DISPENSED
Start: 2021-08-30

## (undated) RX ORDER — LIDOCAINE HYDROCHLORIDE 20 MG/ML
INJECTION, SOLUTION EPIDURAL; INFILTRATION; INTRACAUDAL; PERINEURAL
Status: DISPENSED
Start: 2021-06-10

## (undated) RX ORDER — ALBUMIN, HUMAN INJ 5% 5 %
SOLUTION INTRAVENOUS
Status: DISPENSED
Start: 2021-11-18